# Patient Record
Sex: MALE | Race: WHITE | NOT HISPANIC OR LATINO | Employment: OTHER | URBAN - METROPOLITAN AREA
[De-identification: names, ages, dates, MRNs, and addresses within clinical notes are randomized per-mention and may not be internally consistent; named-entity substitution may affect disease eponyms.]

---

## 2017-01-24 ENCOUNTER — TELEPHONE (OUTPATIENT)
Dept: FAMILY MEDICINE | Facility: CLINIC | Age: 57
End: 2017-01-24

## 2017-01-24 DIAGNOSIS — E66.9 OBESITY: ICD-10-CM

## 2017-01-24 DIAGNOSIS — E11.40 TYPE 2 DIABETES MELLITUS WITH DIABETIC NEUROPATHY, WITHOUT LONG-TERM CURRENT USE OF INSULIN (H): Primary | ICD-10-CM

## 2017-03-02 ENCOUNTER — TELEPHONE (OUTPATIENT)
Dept: NURSING | Facility: CLINIC | Age: 57
End: 2017-03-02

## 2017-03-09 ENCOUNTER — TELEPHONE (OUTPATIENT)
Dept: NURSING | Facility: CLINIC | Age: 57
End: 2017-03-09

## 2017-03-15 ENCOUNTER — TELEPHONE (OUTPATIENT)
Dept: NURSING | Facility: CLINIC | Age: 57
End: 2017-03-15

## 2017-03-27 ENCOUNTER — TELEPHONE (OUTPATIENT)
Dept: NURSING | Facility: CLINIC | Age: 57
End: 2017-03-27

## 2017-03-30 ENCOUNTER — ALLIED HEALTH/NURSE VISIT (OUTPATIENT)
Dept: NURSING | Facility: CLINIC | Age: 57
End: 2017-03-30

## 2017-03-30 VITALS — BODY MASS INDEX: 41.29 KG/M2 | WEIGHT: 248.13 LBS

## 2017-03-30 DIAGNOSIS — E66.9 OBESITY: ICD-10-CM

## 2017-03-30 DIAGNOSIS — L30.9 DERMATITIS: Primary | ICD-10-CM

## 2017-03-30 DIAGNOSIS — E11.40 TYPE 2 DIABETES MELLITUS WITH DIABETIC NEUROPATHY (H): Primary | ICD-10-CM

## 2017-03-30 PROCEDURE — 99207 ZZC HEALTH COACHING, NO CHARGE: CPT

## 2017-03-30 NOTE — TELEPHONE ENCOUNTER
hydrocortosone 0.2% cream     Last Written Prescription Date: ? Not on patients current medication list   Last Fill Quantity: ?,  # refills: ?   Last Office Visit with [facility], [facility] or [facility] prescribing provider: 11/15/2016    Patient requesting a 1 time prescription to be sent to [facility] and then a permanent prescription sent to [facility].  If you have any questions please contact patient at [phone].    Tonya Broussard/

## 2017-03-30 NOTE — MR AVS SNAPSHOT
After Visit Summary   3/30/2017    Steve Morgan    MRN: [id number]           Patient Information     Date Of Birth          1960        Visit Information        Provider Department      3/30/2017 1:00 PM HEALTH  - [facility]        Care Instructions      2017    [facility]  [address] [facility] [address]  [phone]  Health Coaching Progress Note    Patient Name: Steve Morgan Date: 2017      GOALS: Patient will work on the following goals until our next meetin) Check out [employer] program-see if eligible and look into walking option in [facility]-New Goal  2) Start journaling meals and snacks and use the carb counting guide as a reference (shoot for 45-60 grams per meal and 15-30 grams per snack)-New Goal   3) Be more active each day/get up and move as able-New Goal    Recipes--> www.diabetes.org (use the recipes tab at the top)  Food look up-->www.[employer]      Plan: (Homework, other):  Patient was encouraged to continue to seek condition-related information and education, as well as schedule a follow up appointment with the Health  in 4 weeks. Patient has set self-identified goals and will monitor progress until the next appointment.  Scheduled our second meeting for  at 1pm.  Parrish Bowles       Resources:              Follow-ups after your visit        Who to contact     If you have questions or need follow up information about today's clinic visit or your schedule please contact [facility] directly at [phone].  Normal or non-critical lab and imaging results will be communicated to you by MyChart, letter or phone within 4 business days after the clinic has received the results. If you do not hear from us within 7 days, please contact the clinic through MyChart or phone. If you have a  critical or abnormal lab result, we will notify you by phone as soon as possible.  Submit refill requests through BookTour or call your pharmacy and they will forward the refill request to us. Please allow 3 business days for your refill to be completed.          Additional Information About Your Visit        GeckoGohart Information     BookTour gives you secure access to your electronic health record. If you see a primary care provider, you can also send messages to your care team and make appointments. If you have questions, please call your primary care clinic.  If you do not have a primary care provider, please call [phone] and they will assist you.        Care EveryWhere ID     This is your Care EveryWhere ID. This could be used by other organizations to access your [place] medical records  [id number]         Blood Pressure from Last 3 Encounters:   11/15/16 128/80   05/26/16 128/80   05/19/16 137/80    Weight from Last 3 Encounters:   11/15/16 251 lb (113.9 kg)   05/26/16 236 lb (107 kg)   05/19/16 237 lb (107.5 kg)              Today, you had the following     No orders found for display       Primary Care Provider Office Phone # Fax #    Lisa Sue Pierre PA-C [phone]       [address] [address]        Thank you!     Thank you for choosing [facility]  for your care. Our goal is always to provide you with excellent care. Hearing back from our patients is one way we can continue to improve our services. Please take a few minutes to complete the written survey that you may receive in the mail after your visit with us. Thank you!             Your Updated Medication List - Protect others around you: Learn how to safely use, store and throw away your medicines at www.disposemymeds.org.          This list is accurate as of: 3/30/17  2:13 PM.  Always use your most recent med list.                   Brand Name Dispense Instructions  for use    aspirin 81 MG tablet     100    ONE DAILY       atenolol 25 MG tablet    TENORMIN    90 tablet    Take 1 tablet (25 mg) by mouth every morning       blood glucose monitoring lancets     1 Box    Use to test blood sugars 2 times daily or as directed.       blood glucose monitoring meter device kit     1 kit    Use to test blood sugars 2 times daily or as directed.       blood glucose monitoring test strip    ONE TOUCH VERIO IQ    100 strip    Use to test blood sugars 2 times daily or as directed.       buPROPion 300 MG 24 hr tablet    WELLBUTRIN XL    90 tablet    Take 1 tablet (300 mg) by mouth every morning       CALCIUM 600 + D PO      Take 1 tablet by mouth daily       citalopram 20 MG tablet    celeXA    90 tablet    Take 1 tablet (20 mg) by mouth daily       ferrous sulfate 325 (65 FE) MG tablet    IRON     Take 1 tablet by mouth daily (with breakfast).       glipiZIDE 10 MG 24 hr tablet    GLUCOTROL XL    180 tablet    Take 2 tablets (20 mg) by mouth every morning       levothyroxine 200 MCG tablet    SYNTHROID/LEVOTHROID    90 tablet    Take 1 tablet (200 mcg) by mouth daily       losartan 50 MG tablet    COZAAR    90 tablet    Take 1 tablet (50 mg) by mouth daily       metFORMIN 1000 MG tablet    GLUCOPHAGE    180 tablet    Take 1 tablet (1,000 mg) by mouth 2 times daily (with meals)       Multi-vitamin Tabs tablet   Generic drug:  multivitamin, therapeutic with minerals     30    1 TABLET DAILY       nystatin 015305 UNIT/GM Powd    MYCOSTATIN    60 g    Apply topically 3 times a day as needed to rash       omeprazole-sodium bicarbonate  MG per capsule    ZEGERID     Take 1 capsule by mouth every morning (before breakfast)       * order for DME     1 Units    Equipment being ordered: single cane with rubber foot       * order for DME     1 Units    Equipment being ordered: four pronged cane       * order for DME     3 Device    Equipment being ordered: Please dispense up to 3 pairs of knee  high compression stockings at 20-30 mmHg. Home Medical Equipment:  1.  [facility] [address] [address] [facility] [phone] 2. [facility] - [address]  [address]  [place], [phone]       simvastatin 20 MG tablet    ZOCOR    90 tablet    Take 1 tablet (20 mg) by mouth At Bedtime       VITAMIN B 12 PO      Take 250 mcg by mouth daily       VITAMIN D (CHOLECALCIFEROL) PO      Take 1,000 Units by mouth daily.       * Notice:  This list has 3 medication(s) that are the same as other medications prescribed for you. Read the directions carefully, and ask your doctor or other care provider to review them with you.

## 2017-03-30 NOTE — NURSING NOTE
March 30, 2017    [facility]  [address] [address] [address] [phone]  Health Coaching Progress Note    Patient Name: Steve Morgan Date: March 30, 2017      Session Length: 60      DATA    PRM Master Survey Scores Reviewed: Yes    Core Healthy Days Survey:    Would you say that in general your health is: : Fair    SHANNON Score (Last Two) 3/8/2011 3/30/2017   SHANNON Raw Score 44 29   Activation Score 70.8 52.9   SHANNON Level 4 2       PHQ-2 Score 3/30/2017 5/18/2016   PHQ-2 Total Score Interpretation - Positive if 3 or more points; Administer PHQ-9 if positive 5 -   MyC PHQ-2 Score - 3       PHQ-9 SCORE 11/15/2016 3/30/2017   Total Score - -   Total Score MyChart - -   Total Score 16 12       Treatment Objective(s) Addressed in This Session:  Target Behavior(s): diet/weight loss and disease management/lifestyle changes of working on finding some options to be more active each day-pt. interested in [employer] program which could help get a free membership to gym ([employer])-will look into eligibility, walking paths-losts of affordable options in [facility] for walking -will look at resource see what options might work for him, being more aware of what he eats/not skipping lunch-will start journaling food intake and tracking carb intake, and reading up on resources.    Current Stressors / Issues:  Neuropathy in hands and feet, mobility issues due to neuropathy, managing diabetes, depression-mood, doesn't always feel like taking care of himself, gets bored-can't work anymore due to hand issues, realizes he needs to be more social-helps with mood, diet-hard to make healthy decisions when his wife is at work, worried about falling, sleep apnea-wears CPAP.    What Patient Does Well:   1) Patient is very honest and positive-he understands the steps he needs to take to be healthier-just has to find the best way that works for him and his  life  Previous Successes:   1) Patient had success losing weight years ago using the weight watchers program  Areas in Need of Improvement:   1) Diabetes management-testing sugars  2) Diet-being more aware/less carbs?  3) Exercise/activity level  4) Mood-feeling better about self  Barriers to Change:   1) Severe neuropathy (can't feel feet or hands)  2) Limited mobility  3) Depression-doesn't always feel like taking care of himself  4) Motivation/Sticking with routine  Reasons for Change:   1) Improve mood/depression  2) manage diabetes  3) Move better/lose some weight  4) Be around for wife and kids  Plan/Goal for the Next 4 Weeks:   GOAL #1: Check out [employer] program-see if eligible (call # provided) and look into indoor walking options in [facility] (printouts provided for both)  GOAL #1 Progress Toward Goal: 0% New Goal  GOAL #2: Start journaling meals and snacks and use the carb counting guide as a reference (shoot for 60-75 grams per meal and 15-30 grams per snack)  GOAL #2 Progress Toward Goal: 0% New Goal  GOAL #3: Be more active each day/get up and move as able (work around the house/cleaning, etc.)  GOAL #3 Progress Toward Goal: 0% New Goal    Intervention:  Motivational Interviewing    MI Intervention: Co-Developed Goal: activity level, diet, Expressed Empathy/Understanding, Supported Autonomy, Collaboration, Evocation, Permission to raise concern or advise, Open-ended questions, Reflections: simple and complex and Change talk (evoked)     Change Talk Expressed by the Patient: Desire to change Ability to change Reasons to change Need to change Committment to change Activation Taking steps    Provider Response to Change Talk: E - Evoked more info from patient about behavior change, A - Affirmed patient's thoughts, decisions, or attempts at behavior change, R - Reflected patient's change talk and S - Summarized patient's change talk statements    Assessment / Progress on Treatment Objective(s) /  Homework:    New Objective established this session - PREPARATION (Decided to change - considering how); Intervened by negotiating a change plan and determining options / strategies for behavior change, identifying triggers, exploring social supports, and working towards setting a date to begin behavior change         Plan: (Homework, other):  Patient was encouraged to continue to seek condition-related information and education, as well as schedule a follow up appointment with the Health  in 4 weeks. Patient has set self-identified goals and will monitor progress until the next appointment.  Scheduled our second meeting for Tuesday April 25th at 1pm.  Parrish Bowles

## 2017-03-30 NOTE — PATIENT INSTRUCTIONS
2017    [address] [address]  [phone]  Health Coaching Progress Note    Patient Name: Steve Morgan Date: 2017      GOALS: Patient will work on the following goals until our next meetin) Check out "[employer]" program-see if eligible and look into walking option in [facility]-New Goal  2) Start journaling meals and snacks and use the carb counting guide as a reference (shoot for 60-75 grams per meal and 15-30 grams per snack)-New Goal   3) Be more active each day/get up and move as able (work around the house/cleaning, etc.)-New Goal    Recipes--> www.diabetes.org (use the recipes tab at the top)  Food look up-->www.[employer].com      Plan: (Homework, other):  Patient was encouraged to continue to seek condition-related information and education, as well as schedule a follow up appointment with the Health  in 4 weeks. Patient has set self-identified goals and will monitor progress until the next appointment.  Scheduled our second meeting for  at 1pm.  Parrish Bowles       Resources:

## 2017-03-31 RX ORDER — HYDROCORTISONE VALERATE CREAM 2 MG/G
CREAM TOPICAL 2 TIMES DAILY PRN
Qty: 45 G | Refills: 1 | Status: SHIPPED | OUTPATIENT
Start: 2017-03-31 | End: 2017-12-08

## 2017-03-31 ASSESSMENT — PATIENT HEALTH QUESTIONNAIRE - PHQ9: SUM OF ALL RESPONSES TO PHQ QUESTIONS 1-9: 12

## 2017-03-31 NOTE — TELEPHONE ENCOUNTER
Routing refill request to provider for review/approval because:  Drug not active on patient's medication list  Last prescribed by Mariangel Fernando for Rash 1/21/13 and discontinued 10/22/14  Gaurav Lovell RN

## 2017-04-25 ENCOUNTER — TELEPHONE (OUTPATIENT)
Dept: NURSING | Facility: CLINIC | Age: 57
End: 2017-04-25

## 2017-04-25 DIAGNOSIS — E03.9 HYPOTHYROIDISM, UNSPECIFIED TYPE: ICD-10-CM

## 2017-04-25 NOTE — TELEPHONE ENCOUNTER
levothyroxine (SYNTHROID/LEVOTHROID) 200 MCG tablet    Last Written Prescription Date: 11/15/2016  Last Quantity: 90,01/27/2017 # refills: 1  Last Office Visit with [facility], [facility] or [facility] prescribing provider: 11/15/2016-Lisa Pierre   Next 5 appointments (look out 90 days)     Apr 25, 2017  1:00 PM CDT   Nurse Only with HEALTH  - [address] ([facility])    [address] [place] [address] [phone]                   TSH   Date Value Ref Range Status   12/22/2016 0.48 0.40 - 4.00 mU/L Final

## 2017-04-27 RX ORDER — LEVOTHYROXINE SODIUM 200 UG/1
200 TABLET ORAL DAILY
Qty: 90 TABLET | Refills: 1 | Status: SHIPPED | OUTPATIENT
Start: 2017-04-27 | End: 2017-12-12

## 2017-04-27 NOTE — TELEPHONE ENCOUNTER
Prescription approved per [facility] Refill Protocol  Laura Pizarro RN BS

## 2017-05-02 DIAGNOSIS — I10 ESSENTIAL HYPERTENSION WITH GOAL BLOOD PRESSURE LESS THAN 140/90: ICD-10-CM

## 2017-05-02 NOTE — TELEPHONE ENCOUNTER
losartan (COZAAR) 50 MG tablet      Last Written Prescription Date: 5/26/16  Last Fill Quantity: 90, # refills: 3  Last Office Visit with G, P or [facility] prescribing provider: 5/26/2016         Potassium   Date Value Ref Range Status   05/19/2016 4.8 3.4 - 5.3 mmol/L Final     Creatinine   Date Value Ref Range Status   05/19/2016 1.17 0.66 - 1.25 mg/dL Final     BP Readings from Last 3 Encounters:   11/15/16 128/80   05/26/16 128/80   05/19/16 137/80         EUGENIO Luevano  May 2, 2017  8:04 AM

## 2017-05-03 RX ORDER — LOSARTAN POTASSIUM 50 MG/1
50 TABLET ORAL DAILY
Qty: 90 TABLET | Refills: 0 | Status: SHIPPED | OUTPATIENT
Start: 2017-05-03 | End: 2017-08-01

## 2017-05-03 NOTE — TELEPHONE ENCOUNTER
Last OV: 11.15.16    Prescription approved per [facility] Refill Protocol  Laura Pizarro RN BS

## 2017-05-04 ENCOUNTER — TELEPHONE (OUTPATIENT)
Dept: NURSING | Facility: CLINIC | Age: 57
End: 2017-05-04

## 2017-05-11 ENCOUNTER — TELEPHONE (OUTPATIENT)
Dept: NURSING | Facility: CLINIC | Age: 57
End: 2017-05-11

## 2017-05-17 ENCOUNTER — OFFICE VISIT (OUTPATIENT)
Dept: FAMILY MEDICINE | Facility: CLINIC | Age: 57
End: 2017-05-17
Payer: COMMERCIAL

## 2017-05-17 DIAGNOSIS — I10 ESSENTIAL HYPERTENSION WITH GOAL BLOOD PRESSURE LESS THAN 140/90: ICD-10-CM

## 2017-05-17 DIAGNOSIS — R45.4 EXCESSIVE ANGER: ICD-10-CM

## 2017-05-17 DIAGNOSIS — Z13.89 SCREENING FOR DIABETIC PERIPHERAL NEUROPATHY: ICD-10-CM

## 2017-05-17 DIAGNOSIS — E78.5 HYPERLIPIDEMIA LDL GOAL <100: ICD-10-CM

## 2017-05-17 DIAGNOSIS — K29.00 OTHER ACUTE GASTRITIS WITHOUT HEMORRHAGE: ICD-10-CM

## 2017-05-17 DIAGNOSIS — R23.4 FISSURE IN SKIN: ICD-10-CM

## 2017-05-17 DIAGNOSIS — E11.40 TYPE 2 DIABETES MELLITUS WITH DIABETIC NEUROPATHY, WITHOUT LONG-TERM CURRENT USE OF INSULIN (H): Primary | ICD-10-CM

## 2017-05-17 LAB
ERYTHROCYTE [DISTWIDTH] IN BLOOD BY AUTOMATED COUNT: 13.3 % (ref 10–15)
HBA1C MFR BLD: 10 % (ref 4.3–6)
HCT VFR BLD AUTO: 35.1 % (ref 40–53)
HGB BLD-MCNC: 12.3 G/DL (ref 13.3–17.7)
MCH RBC QN AUTO: 29.9 PG (ref 26.5–33)
MCHC RBC AUTO-ENTMCNC: 35 G/DL (ref 31.5–36.5)
MCV RBC AUTO: 85 FL (ref 78–100)
PLATELET # BLD AUTO: 349 10E9/L (ref 150–450)
RBC # BLD AUTO: 4.12 10E12/L (ref 4.4–5.9)
WBC # BLD AUTO: 6 10E9/L (ref 4–11)

## 2017-05-17 PROCEDURE — 80053 COMPREHEN METABOLIC PANEL: CPT | Performed by: PHYSICIAN ASSISTANT

## 2017-05-17 PROCEDURE — 99207 C FOOT EXAM  NO CHARGE: CPT | Performed by: PHYSICIAN ASSISTANT

## 2017-05-17 PROCEDURE — 99214 OFFICE O/P EST MOD 30 MIN: CPT | Performed by: PHYSICIAN ASSISTANT

## 2017-05-17 PROCEDURE — 85027 COMPLETE CBC AUTOMATED: CPT | Performed by: PHYSICIAN ASSISTANT

## 2017-05-17 PROCEDURE — 36415 COLL VENOUS BLD VENIPUNCTURE: CPT | Performed by: PHYSICIAN ASSISTANT

## 2017-05-17 PROCEDURE — 80061 LIPID PANEL: CPT | Performed by: PHYSICIAN ASSISTANT

## 2017-05-17 PROCEDURE — 83036 HEMOGLOBIN GLYCOSYLATED A1C: CPT | Performed by: PHYSICIAN ASSISTANT

## 2017-05-17 RX ORDER — ATENOLOL 25 MG/1
25 TABLET ORAL EVERY MORNING
Qty: 90 TABLET | Refills: 1 | Status: SHIPPED | OUTPATIENT
Start: 2017-05-17 | End: 2017-12-12

## 2017-05-17 RX ORDER — SIMVASTATIN 20 MG
20 TABLET ORAL AT BEDTIME
Qty: 90 TABLET | Refills: 3 | Status: SHIPPED | OUTPATIENT
Start: 2017-05-17 | End: 2018-11-19

## 2017-05-17 RX ORDER — BUPROPION HYDROCHLORIDE 300 MG/1
300 TABLET ORAL EVERY MORNING
Qty: 90 TABLET | Refills: 1 | Status: SHIPPED | OUTPATIENT
Start: 2017-05-17 | End: 2017-12-12

## 2017-05-17 RX ORDER — CITALOPRAM HYDROBROMIDE 20 MG/1
20 TABLET ORAL DAILY
Qty: 90 TABLET | Refills: 1 | Status: SHIPPED | OUTPATIENT
Start: 2017-05-17 | End: 2017-12-12

## 2017-05-17 RX ORDER — CEPHALEXIN 500 MG/1
500 CAPSULE ORAL 3 TIMES DAILY
Qty: 30 CAPSULE | Refills: 0 | Status: SHIPPED | OUTPATIENT
Start: 2017-05-17 | End: 2017-06-24

## 2017-05-17 NOTE — NURSING NOTE
"Chief Complaint   Patient presents with     Diabetes     Nausea       Initial /90 (BP Location: Right arm, Patient Position: Chair, Cuff Size: Adult Large)  Pulse 82  Temp 98.7  F (37.1  C) (Oral)  Resp 17  Ht 5' 5\" (1.651 m)  Wt 236 lb 9.6 oz (107.3 kg)  SpO2 96%  BMI 39.37 kg/m2 Estimated body mass index is 39.37 kg/(m^2) as calculated from the following:    Height as of this encounter: 5' 5\" (1.651 m).    Weight as of this encounter: 236 lb 9.6 oz (107.3 kg).  Medication Reconciliation: complete Yenni Castro MA  Health Maintenance has been reviewed.       "

## 2017-05-17 NOTE — MR AVS SNAPSHOT
After Visit Summary   5/17/2017    Steve Morgan    MRN: [id number]           Patient Information     Date Of Birth          1960        Visit Information        Provider Department      5/17/2017 9:15 AM Lisa Pirere PA-C [facility]        Today's Diagnoses     Screening for diabetic peripheral neuropathy    -  1    Type 2 diabetes mellitus with diabetic neuropathy, without long-term current use of insulin (H)        Hyponatremia        Hyperlipidemia LDL goal <100        Fissure in skin        Excessive anger        Essential hypertension with goal blood pressure less than 140/90           Follow-ups after your visit        Additional Services     ENDOCRINOLOGY ADULT REFERRAL       Your provider has referred you to: [facility]: [facility] [phone]   [email or web address]      Please be aware that coverage of these services is subject to the terms and limitations of your health insurance plan.  Call member services at your health plan with any benefit or coverage questions.      Please bring the following to your appointment:    >>   Any x-rays, CTs or MRIs which have been performed.  Contact the facility where they were done to arrange for  prior to your scheduled appointment.    >>   List of current medications   >>   This referral request   >>   Any documents/labs given to you for this referral                  Who to contact     If you have questions or need follow up information about today's clinic visit or your schedule please contact [facility] directly at [phone].  Normal or non-critical lab and imaging results will be communicated to you by MyChart, letter or phone within 4 business days after the clinic has received the results. If you do not hear from us within 7 days, please contact the clinic through MyChart or phone. If you have a critical or abnormal lab result, we will  "notify you by phone as soon as possible.  Submit refill requests through Dianxin or call your pharmacy and they will forward the refill request to us. Please allow 3 business days for your refill to be completed.          Additional Information About Your Visit        [employer] Leaning[employer]TouchOfModern Information     Dianxin gives you secure access to your electronic health record. If you see a primary care provider, you can also send messages to your care team and make appointments. If you have questions, please call your primary care clinic.  If you do not have a primary care provider, please call [phone] and they will assist you.        Care EveryWhere ID     This is your Care EveryWhere ID. This could be used by other organizations to access your [place] medical records  [id number]        Your Vitals Were     Pulse Temperature Respirations Height Pulse Oximetry BMI (Body Mass Index)    82 98.7  F (37.1  C) (Oral) 17 5' 5\" (1.651 m) 96% 39.37 kg/m2       Blood Pressure from Last 3 Encounters:   05/17/17 139/90   11/15/16 128/80   05/26/16 128/80    Weight from Last 3 Encounters:   05/17/17 236 lb 9.6 oz (107.3 kg)   03/30/17 248 lb 2 oz (112.5 kg)   11/15/16 251 lb (113.9 kg)              We Performed the Following     CBC with platelets     Comprehensive metabolic panel (BMP + Alb, Alk Phos, ALT, AST, Total. Bili, TP)     ENDOCRINOLOGY ADULT REFERRAL     FOOT EXAM  NO CHARGE [72077.114]     HEMOGLOBIN A1C     Lipid panel reflex to direct LDL          Today's Medication Changes          These changes are accurate as of: 5/17/17  9:48 AM.  If you have any questions, ask your nurse or doctor.               Start taking these medicines.        Dose/Directions    cephALEXin 500 MG capsule   Commonly known as:  KEFLEX   Used for:  Fissure in skin   Started by:  Lisa Pierre PA-C        Dose:  500 mg   Take 1 capsule (500 mg) by mouth 3 times daily   Quantity:  30 capsule   Refills:  0         These medicines have changed or " have updated prescriptions.        Dose/Directions    * order for DME   This may have changed:  Another medication with the same name was removed. Continue taking this medication, and follow the directions you see here.   Used for:  Type 2 diabetes, HbA1C goal < 8% (H), Neuropathy in diabetes (H), Unsteady gait   Changed by:  Lisa Pierre PA-C        Equipment being ordered: single cane with rubber foot   Quantity:  1 Units   Refills:  0       * order for DME   This may have changed:  Another medication with the same name was removed. Continue taking this medication, and follow the directions you see here.   Used for:  Unsteady gait, Neuropathy in diabetes (H), Type 2 diabetes, HbA1C goal < 8% (H)   Changed by:  Lisa Pierre PA-C        Equipment being ordered: four pronged cane   Quantity:  1 Units   Refills:  0       * Notice:  This list has 2 medication(s) that are the same as other medications prescribed for you. Read the directions carefully, and ask your doctor or other care provider to review them with you.      Stop taking these medicines if you haven't already. Please contact your care team if you have questions.     nystatin 155244 UNIT/GM Powd   Commonly known as:  MYCOSTATIN   Stopped by:  Lisa Pierre PA-C                Where to get your medicines      These medications were sent to [facility] [address] [address]     Phone:  [phone]     atenolol 25 MG tablet    buPROPion 300 MG 24 hr tablet    cephALEXin 500 MG capsule    citalopram 20 MG tablet    simvastatin 20 MG tablet                Primary Care Provider Office Phone # Fax #    Lisa Pierre PA-C [phone]       [address] [address]        Thank you!     Thank you for choosing [facility]  for your care. Our goal is always to provide you with  excellent care. Hearing back from our patients is one way we can continue to improve our services. Please take a few minutes to complete the written survey that you may receive in the mail after your visit with us. Thank you!             Your Updated Medication List - Protect others around you: Learn how to safely use, store and throw away your medicines at www.disposemymeds.org.          This list is accurate as of: 5/17/17  9:48 AM.  Always use your most recent med list.                   Brand Name Dispense Instructions for use    aspirin 81 MG tablet     100    ONE DAILY       atenolol 25 MG tablet    TENORMIN    90 tablet    Take 1 tablet (25 mg) by mouth every morning       blood glucose monitoring lancets     1 Box    Use to test blood sugars 2 times daily or as directed.       blood glucose monitoring meter device kit     1 kit    Use to test blood sugars 2 times daily or as directed.       blood glucose monitoring test strip    ONE TOUCH VERIO IQ    100 strip    Use to test blood sugars 2 times daily or as directed.       buPROPion 300 MG 24 hr tablet    WELLBUTRIN XL    90 tablet    Take 1 tablet (300 mg) by mouth every morning       CALCIUM 600 + D PO      Take 1 tablet by mouth daily       cephALEXin 500 MG capsule    KEFLEX    30 capsule    Take 1 capsule (500 mg) by mouth 3 times daily       citalopram 20 MG tablet    celeXA    90 tablet    Take 1 tablet (20 mg) by mouth daily       ferrous sulfate 325 (65 FE) MG tablet    IRON     Take 1 tablet by mouth daily (with breakfast).       glipiZIDE 10 MG 24 hr tablet    GLUCOTROL XL    180 tablet    Take 2 tablets (20 mg) by mouth every morning       hydrocortisone 0.2 % cream    WESTCORT    45 g    Apply topically 2 times daily as needed Apply sparingly to affected area, BID.       levothyroxine 200 MCG tablet    SYNTHROID/LEVOTHROID    90 tablet    Take 1 tablet (200 mcg) by mouth daily       losartan 50 MG tablet    COZAAR    90 tablet    Take 1 tablet  (50 mg) by mouth daily       metFORMIN 1000 MG tablet    GLUCOPHAGE    180 tablet    Take 1 tablet (1,000 mg) by mouth 2 times daily (with meals)       Multi-vitamin Tabs tablet   Generic drug:  multivitamin, therapeutic with minerals     30    1 TABLET DAILY       omeprazole-sodium bicarbonate  MG per capsule    ZEGERID     Take 1 capsule by mouth every morning (before breakfast)       * order for DME     1 Units    Equipment being ordered: single cane with rubber foot       * order for DME     1 Units    Equipment being ordered: four pronged cane       simvastatin 20 MG tablet    ZOCOR    90 tablet    Take 1 tablet (20 mg) by mouth At Bedtime       VITAMIN B 12 PO      Take 250 mcg by mouth daily       VITAMIN D (CHOLECALCIFEROL) PO      Take 1,000 Units by mouth daily.       * Notice:  This list has 2 medication(s) that are the same as other medications prescribed for you. Read the directions carefully, and ask your doctor or other care provider to review them with you.

## 2017-05-17 NOTE — PROGRESS NOTES
SUBJECTIVE:                                                    Steve Morgan is a 57 year old male who presents to clinic today for the following health issues:        Diabetes Follow-up      Patient is checking blood sugars: not at all    Diabetic concerns: None     Symptoms of hypoglycemia (low blood sugar): none     Paresthesias (numbness or burning in feet) or sores: Yes left foot     Date of last diabetic eye exam: about 1 year ago     Hyperlipidemia Follow-Up      Rate your low fat/cholesterol diet?: not monitoring fat    Taking statin?  Yes, no muscle aches from statin    Other lipid medications/supplements?:  none     Hypertension Follow-up      Outpatient blood pressures are not being checked.    Low Salt Diet: not monitoring salt         Amount of exercise or physical activity: None    Problems taking medications regularly: No    Medication side effects: Maybe nausea from something     Diet: regular (no restrictions)    Patient states that he has been having nausea and vomiting every time he eats. Patient states he has been having gas as well x 3 days  Patient states he is not taking any of his medications at this time due to not knowing if the medications are making him sick    Medication Followup of wellbutrin and celexa    Taking Medication as prescribed: yes    Side Effects:  None    Medication Helping Symptoms:  yes     Nausea and vomiting     Onset: 3 days      Intensity: mild, moderate    Progression of Symptoms:  waxing and waning    Accompanying Signs & Symptoms:  Fever/Chills?: no   Gas/Bloating: YES  Nausea: YES  Vomitting: YES  Diarrhea?: no   Constipation:no   Dysuria or Hematuria: no    History:   Trauma: no   Previous similar pain: no    Previous tests done: none    Precipitating factors:   Does the pain change with:     Food: YES     BM: no     Urination: no     Alleviating factors:  none    Therapies Tried and outcome: stopped DM meds    LMP:           Problem list and histories reviewed  & adjusted, as indicated.  Additional history: as documented    Patient Active Problem List   Diagnosis     Anemia     Obesity     Sleep apnea     Neuropathy in diabetes (H)     Hypothyroidism     HYPERLIPIDEMIA LDL GOAL <100     Hypertension goal BP (blood pressure) < 140/90     Peripheral arterial disease (H)     Tremor     Peripheral neuropathy (H)     Excessive anger     Generalized muscle weakness     Health Care Home     Unsteady gait     DAMIÁN (obstructive sleep apnea)     Vitamin B12 deficiency without anemia     Hyponatremia     Chronic hyponatremia     Type 2 diabetes mellitus with diabetic neuropathy (H)     Past Surgical History:   Procedure Laterality Date     COLONOSCOPY       GENITOURINARY SURGERY      surg for undescended testicle     REPAIR HAMMER TOE BILATERAL  5/16/2013    Procedure: REPAIR HAMMER TOE BILATERAL;  Flexor Tenotomy Toes 2,3,4,5 Bilateral Feet;  Surgeon: Saad Bangura DPM;  Location:  OR       Social History   Substance Use Topics     Smoking status: Never Smoker     Smokeless tobacco: Never Used     Alcohol use Yes      Comment: occ     Family History   Problem Relation Age of Onset     Breast Cancer Mother      Hypertension Mother      Thyroid Disease Mother      Depression Mother      Cancer - colorectal Father      Thyroid Disease Father      Depression Father      Circulatory Paternal Grandmother      CANCER Paternal Grandfather      DIABETES Brother      DIABETES Brother      HEART DISEASE Maternal Grandmother      CHF           Reviewed and updated as needed this visit by clinical staff  Tobacco  Allergies       Reviewed and updated as needed this visit by Provider         ROS:  Constitutional, HEENT, cardiovascular, pulmonary, GI, , musculoskeletal, neuro, skin, endocrine and psych systems are negative, except as otherwise noted.    OBJECTIVE:                                                    /89 (BP Location: Right arm, Patient Position: Chair, Cuff Size:  "Adult Large)  Pulse 82  Temp 98.7  F (37.1  C) (Oral)  Resp 17  Ht 5' 5\" (1.651 m)  Wt 236 lb 9.6 oz (107.3 kg)  SpO2 96%  BMI 39.37 kg/m2  Body mass index is 39.37 kg/(m^2).  GENERAL APPEARANCE: healthy, alert and no distress  HENT: ear canals and TM's normal and nose and mouth without ulcers or lesions  RESP: lungs clear to auscultation - no rales, rhonchi or wheezes  CV: regular rates and rhythm, normal S1 S2, no S3 or S4 and no murmur, click or rub  ABDOMEN: soft, nontender, without hepatosplenomegaly or masses and bowel sounds normal  MS: extremities normal- no gross deformities noted  SKIN: fissure noted on plantar foot. Skin fissures on hands   DIABETIC FOOT EXAM: normal DP and PT pulses and reduced sensation at ankle    PSYCH: mentation appears normal and affect flat         ASSESSMENT/PLAN:                                                            1. Type 2 diabetes mellitus with diabetic neuropathy, without long-term current use of insulin (H)  Caio is not monitoring his sugars or taking medications.  I suspect his GI symptoms today are either viral or related to elevated sugars.  We need to get him back on track.  Agrees to see Endocrinology  Given information for [employer] for nail trim and foot care.   - HEMOGLOBIN A1C  - Comprehensive metabolic panel (BMP + Alb, Alk Phos, ALT, AST, Total. Bili, TP)  - CBC with platelets  - ENDOCRINOLOGY ADULT REFERRAL    2. Essential hypertension with goal blood pressure less than 140/90  Stable.   - atenolol (TENORMIN) 25 MG tablet; Take 1 tablet (25 mg) by mouth every morning  Dispense: 90 tablet; Refill: 1    3. Screening for diabetic peripheral neuropathy  Significant periopheral neuropathy  - FOOT EXAM  NO CHARGE [61115.114]    4. Hyperlipidemia LDL goal <100    - Lipid panel reflex to direct LDL  - simvastatin (ZOCOR) 20 MG tablet; Take 1 tablet (20 mg) by mouth At Bedtime  Dispense: 90 tablet; Refill: 3    5. Other acute gastritis without " hemorrhage  Monitor symptoms. See #1  - Comprehensive metabolic panel (BMP + Alb, Alk Phos, ALT, AST, Total. Bili, TP)  - CBC with platelets    6. Fissure in skin  emollients  - cephALEXin (KEFLEX) 500 MG capsule; Take 1 capsule (500 mg) by mouth 3 times daily  Dispense: 30 capsule; Refill: 0    7. Excessive anger  Stable. F/u in 6 months.   - buPROPion (WELLBUTRIN XL) 300 MG 24 hr tablet; Take 1 tablet (300 mg) by mouth every morning  Dispense: 90 tablet; Refill: 1  - citalopram (CELEXA) 20 MG tablet; Take 1 tablet (20 mg) by mouth daily  Dispense: 90 tablet; Refill: 1        Lisa Pierre PA-C, ANASTASIA  [facility]

## 2017-05-18 VITALS
SYSTOLIC BLOOD PRESSURE: 139 MMHG | DIASTOLIC BLOOD PRESSURE: 89 MMHG | WEIGHT: 236.6 LBS | TEMPERATURE: 98.7 F | OXYGEN SATURATION: 96 % | RESPIRATION RATE: 17 BRPM | HEIGHT: 65 IN | HEART RATE: 82 BPM | BODY MASS INDEX: 39.42 KG/M2

## 2017-05-18 LAB
ALBUMIN SERPL-MCNC: 3.8 G/DL (ref 3.4–5)
ALP SERPL-CCNC: 98 U/L (ref 40–150)
ALT SERPL W P-5'-P-CCNC: 35 U/L (ref 0–70)
ANION GAP SERPL CALCULATED.3IONS-SCNC: 9 MMOL/L (ref 3–14)
AST SERPL W P-5'-P-CCNC: 22 U/L (ref 0–45)
BILIRUB SERPL-MCNC: 0.5 MG/DL (ref 0.2–1.3)
BUN SERPL-MCNC: 10 MG/DL (ref 7–30)
CALCIUM SERPL-MCNC: 9.2 MG/DL (ref 8.5–10.1)
CHLORIDE SERPL-SCNC: 98 MMOL/L (ref 94–109)
CHOLEST SERPL-MCNC: 192 MG/DL
CO2 SERPL-SCNC: 24 MMOL/L (ref 20–32)
CREAT SERPL-MCNC: 1.02 MG/DL (ref 0.66–1.25)
GFR SERPL CREATININE-BSD FRML MDRD: 75 ML/MIN/1.7M2
GLUCOSE SERPL-MCNC: 368 MG/DL (ref 70–99)
HDLC SERPL-MCNC: 30 MG/DL
LDLC SERPL CALC-MCNC: 126 MG/DL
NONHDLC SERPL-MCNC: 162 MG/DL
POTASSIUM SERPL-SCNC: 4.6 MMOL/L (ref 3.4–5.3)
PROT SERPL-MCNC: 7.8 G/DL (ref 6.8–8.8)
SODIUM SERPL-SCNC: 131 MMOL/L (ref 133–144)
TRIGL SERPL-MCNC: 179 MG/DL

## 2017-05-23 ENCOUNTER — OFFICE VISIT (OUTPATIENT)
Dept: ENDOCRINOLOGY | Facility: CLINIC | Age: 57
End: 2017-05-23
Payer: COMMERCIAL

## 2017-05-23 VITALS
SYSTOLIC BLOOD PRESSURE: 148 MMHG | BODY MASS INDEX: 40.6 KG/M2 | DIASTOLIC BLOOD PRESSURE: 66 MMHG | HEART RATE: 84 BPM | TEMPERATURE: 98.1 F | WEIGHT: 244 LBS

## 2017-05-23 DIAGNOSIS — E11.40 TYPE 2 DIABETES MELLITUS WITH DIABETIC NEUROPATHY, UNSPECIFIED LONG TERM INSULIN USE STATUS: Primary | ICD-10-CM

## 2017-05-23 PROCEDURE — 99244 OFF/OP CNSLTJ NEW/EST MOD 40: CPT | Performed by: CLINICAL NURSE SPECIALIST

## 2017-05-23 NOTE — NURSING NOTE
"Chief Complaint   Patient presents with     Diabetes       Initial /66 (BP Location: Left arm, Patient Position: Chair, Cuff Size: Adult Regular)  Pulse 84  Temp 98.1  F (36.7  C) (Oral)  Wt 244 lb (110.7 kg)  BMI 40.6 kg/m2 Estimated body mass index is 40.6 kg/(m^2) as calculated from the following:    Height as of 5/17/17: 5' 5\" (1.651 m).    Weight as of this encounter: 244 lb (110.7 kg).  Medication Reconciliation: {Medication Reconciliation:400160}    "

## 2017-05-23 NOTE — MR AVS SNAPSHOT
After Visit Summary   5/23/2017    Steve Morgan    MRN: [id number]           Patient Information     Date Of Birth          1960        Visit Information        Provider Department      5/23/2017 1:30 PM Diana Butler APRN [facility]        Today's Diagnoses     Type 2 diabetes mellitus with diabetic neuropathy, unspecified long term insulin use status (H)    -  1      Care Instructions         the insulin and pen needles from the pharmacy and bring with you to your appointment with Lucía Bermudez, diabetes educator.    After you see Lucía, start the Lantus insulin, 10 units every evening, about the same time each evening.    Test your blood sugar at least twice daily, in the morning before you eat breakfast and in the evening before you take the Lantus injection.    Bring your meter with to all appointments with me and with diabetes ed.    Follow up with me in one month.    Diana Butler NP  Endocrinology          Follow-ups after your visit        Additional Services     DIABETES EDUCATOR REFERRAL       DIABETES SELF MANAGEMENT TRAINING (DSMT)      Your provider has referred you to Diabetes Education: [facility]: Diabetes Education - All [facility] [phone]   [email or web address]    Type of training and number of hours: Previous Diagnosis: Follow-up DSMT - 2 hours.    Medicare covers: 10 hours of initial DSMT in 12 month period from the time of first visit, plus 2 hours of follow-up DSMT annually, and additional hours as requested for insulin training.    Diabetes Type: Type 2 - On Oral Medication             Diabetes Co-Morbidities: none               A1C Goal:  <7.0       A1C is: Lab Results       Component                Value               Date                       A1C                      10.0                05/17/2017              If an urgent visit is needed or A1C is above 12, Care Team to call the  Diabetes Education Team at [phone] or send an In Basket message to the Diabetes Education Pool (P DIAB ED-PATIENT CARE).    Diabetes Education Topics: Medication Start: Insulin: Type/Dose/Timing: Lantus 10 units q hs    Special Educational Needs Requiring Individual DSMT: None       MEDICAL NUTRITION THERAPY (MNT) for Diabetes    Medical Nutrition Therapy with a Registered Dietitian can be provided in coordination with Diabetes Self-Management Training to assist in achieving optimal diabetes management.    MNT Type and Hours: Previous diagnosis: Annual follow-up MNT - 2 hours                       Medicare will cover: 3 hours initial MNT in 12 month period after first visit, plus 2 hours of follow-up MNT annually    Please be aware that coverage of these services is subject to the terms and limitations of your health insurance plan.  Call member services at your health plan to determine Diabetes Self-Management Training benefits and ask which blood glucose monitor brands are covered by your plan.      Please bring the following with you to your appointment:    (1)  List of current medications   (2)  List of Blood Glucose Monitor brands that are covered by your insurance plan  (3)  Blood Glucose Monitor and log book  (4)   Food records for the 3 days prior to your visit    The Certified Diabetes Educator may make diabetes medication adjustments per the CDE Protocol and Collaborative Practice Agreement.                  Who to contact     If you have questions or need follow up information about today's clinic visit or your schedule please contact [place] [facility] directly at [phone].  Normal or non-critical lab and imaging results will be communicated to you by MyChart, letter or phone within 4 business days after the clinic has received the results. If you do not hear from us within 7 days, please contact the clinic through MyChart or phone. If you have a critical or abnormal lab result, we  will notify you by phone as soon as possible.  Submit refill requests through [employer] or call your pharmacy and they will forward the refill request to us. Please allow 3 business days for your refill to be completed.          Additional Information About Your Visit        [employer] Information     [employer] gives you secure access to your electronic health record. If you see a primary care provider, you can also send messages to your care team and make appointments. If you have questions, please call your primary care clinic.  If you do not have a primary care provider, please call [phone] and they will assist you.        Care EveryWhere ID     This is your Care EveryWhere ID. This could be used by other organizations to access your [place] medical records  [id number]        Your Vitals Were     Pulse Temperature BMI (Body Mass Index)             84 98.1  F (36.7  C) (Oral) 40.6 kg/m2          Blood Pressure from Last 3 Encounters:   05/23/17 148/66   05/17/17 139/89   11/15/16 128/80    Weight from Last 3 Encounters:   05/23/17 110.7 kg (244 lb)   05/17/17 107.3 kg (236 lb 9.6 oz)   03/30/17 112.5 kg (248 lb 2 oz)              We Performed the Following     DIABETES EDUCATOR REFERRAL          Today's Medication Changes          These changes are accurate as of: 5/23/17  2:53 PM.  If you have any questions, ask your nurse or doctor.               Start taking these medicines.        Dose/Directions    insulin glargine 100 UNIT/ML injection   Commonly known as:  LANTUS SOLOSTAR   Used for:  Type 2 diabetes mellitus with diabetic neuropathy, unspecified long term insulin use status (H)   Started by:  Diana Butler APRN CNP        Inject 10 units sq q hs   Quantity:  15 mL   Refills:  3       insulin pen needle 31G X 5 MM   Commonly known as:  B-D U/F   Used for:  Type 2 diabetes mellitus with diabetic neuropathy, unspecified long term insulin use status (H)   Started by:  Diana Butler APRN CNP         Use 1 daily or as directed.   Quantity:  100 each   Refills:  prn            Where to get your medicines      Some of these will need a paper prescription and others can be bought over the counter.  Ask your nurse if you have questions.     Bring a paper prescription for each of these medications     insulin glargine 100 UNIT/ML injection    insulin pen needle 31G X 5 MM                Primary Care Provider Office Phone # Fax #    Lisa Sue Pierre PA-C [phone]       [address] [address]        Thank you!     Thank you for choosing [facility]  for your care. Our goal is always to provide you with excellent care. Hearing back from our patients is one way we can continue to improve our services. Please take a few minutes to complete the written survey that you may receive in the mail after your visit with us. Thank you!             Your Updated Medication List - Protect others around you: Learn how to safely use, store and throw away your medicines at www.disposemymeds.org.          This list is accurate as of: 5/23/17  2:53 PM.  Always use your most recent med list.                   Brand Name Dispense Instructions for use    aspirin 81 MG tablet     100    ONE DAILY       atenolol 25 MG tablet    TENORMIN    90 tablet    Take 1 tablet (25 mg) by mouth every morning       blood glucose monitoring lancets     1 Box    Use to test blood sugars 2 times daily or as directed.       blood glucose monitoring meter device kit     1 kit    Use to test blood sugars 2 times daily or as directed.       blood glucose monitoring test strip    ONE TOUCH VERIO IQ    100 strip    Use to test blood sugars 2 times daily or as directed.       buPROPion 300 MG 24 hr tablet    WELLBUTRIN XL    90 tablet    Take 1 tablet (300 mg) by mouth every morning       CALCIUM 600 + D PO      Take 1 tablet by mouth daily       cephALEXin 500 MG capsule    KEFLEX     30 capsule    Take 1 capsule (500 mg) by mouth 3 times daily       citalopram 20 MG tablet    celeXA    90 tablet    Take 1 tablet (20 mg) by mouth daily       ferrous sulfate 325 (65 FE) MG tablet    IRON     Take 1 tablet by mouth daily (with breakfast).       glipiZIDE 10 MG 24 hr tablet    GLUCOTROL XL    180 tablet    Take 2 tablets (20 mg) by mouth every morning       hydrocortisone 0.2 % cream    WESTCORT    45 g    Apply topically 2 times daily as needed Apply sparingly to affected area, BID.       insulin glargine 100 UNIT/ML injection    LANTUS SOLOSTAR    15 mL    Inject 10 units sq q hs       insulin pen needle 31G X 5 MM    B-D U/F    100 each    Use 1 daily or as directed.       levothyroxine 200 MCG tablet    SYNTHROID/LEVOTHROID    90 tablet    Take 1 tablet (200 mcg) by mouth daily       losartan 50 MG tablet    COZAAR    90 tablet    Take 1 tablet (50 mg) by mouth daily       metFORMIN 1000 MG tablet    GLUCOPHAGE    180 tablet    Take 1 tablet (1,000 mg) by mouth 2 times daily (with meals)       Multi-vitamin Tabs tablet   Generic drug:  multivitamin, therapeutic with minerals     30    1 TABLET DAILY       omeprazole-sodium bicarbonate  MG per capsule    ZEGERID     Take 1 capsule by mouth every morning (before breakfast)       * order for DME     1 Units    Equipment being ordered: single cane with rubber foot       * order for DME     1 Units    Equipment being ordered: four pronged cane       simvastatin 20 MG tablet    ZOCOR    90 tablet    Take 1 tablet (20 mg) by mouth At Bedtime       VITAMIN B 12 PO      Take 250 mcg by mouth daily       VITAMIN D (CHOLECALCIFEROL) PO      Take 1,000 Units by mouth daily.       * Notice:  This list has 2 medication(s) that are the same as other medications prescribed for you. Read the directions carefully, and ask your doctor or other care provider to review them with you.

## 2017-05-23 NOTE — NURSING NOTE
"Chief Complaint   Patient presents with     Diabetes       Initial /66 (BP Location: Left arm, Patient Position: Chair, Cuff Size: Adult Regular)  Pulse 84  Temp 98.1  F (36.7  C) (Oral)  Wt 244 lb (110.7 kg)  BMI 40.6 kg/m2 Estimated body mass index is 40.6 kg/(m^2) as calculated from the following:    Height as of 5/17/17: 5' 5\" (1.651 m).    Weight as of this encounter: 244 lb (110.7 kg).  Medication Reconciliation: complete    "

## 2017-05-23 NOTE — PATIENT INSTRUCTIONS
the insulin and pen needles from the pharmacy and bring with you to your appointment with Lucía Bermudez, diabetes educator.    After you see Lucía, start the Lantus insulin, 10 units every evening, about the same time each evening.    Test your blood sugar at least twice daily, in the morning before you eat breakfast and in the evening before you take the Lantus injection.    Bring your meter with to all appointments with me and with diabetes ed.    Follow up with me in one month.    Diana Butler NP  Endocrinology

## 2017-05-23 NOTE — PROGRESS NOTES
"Name: Steve \"Jamie\"Cathy  Seen at the request of Lisa Pierre for Diabetes.  HPI:  Steve Morgan is a 57 year old male who presents for the evaluation/management of:    1. Type 2 DM:  Orginally diagnosed at the age of 33 or 38.  Was prediabetic prior, was not particularly symptomatic at the time, was noted on routine lab work  Current Regimen: Metformin 1000 mg bid, glipzide 20 mg q am  BS checks: Not testing  Average Meter Download: N/a  Thinks increased blood sugar due to dietary indiscretions, states he hasn't been watching his diet as carefully.  Likes to snack between meals, buys snacks at the gas station.    Complications:   Diabetes Complications  Description / Detail    Diabetic Retinopathy  No retinopathy, early cataract, last exam about 1 year ago   CAD / PAD  No   Neuropathy  Yes: numbness hands and feet   Nephropathy / Microalbuminuria  No   Gastroparesis  No   Hypoglycemia [place]  Not sure, gets 'kind of dizzy' when blood sugar is low but reports history of low blood sugars without symptoms     2. Hypertension: Blood Pressure today:   BP Readings from Last 3 Encounters:   05/23/17 148/66   05/17/17 139/89   11/15/16 128/80   .  Blood pressure medications include atenolol 25 mg qd and losartan 50 mg qd  .  Takes medications everyday without forgetting a dose.  Denies feeling lightheaded or dizzy.   3. Hyperlipidemia: Takes simvastatin 20 mg for lipid control.  Denies muscle aches of pains.       4. Prevention:  Flu Shot- 11/2016  Pneumovax- 2012  Opthalmology-Yes: due 6/2017  Dental-Yes: twice a year  ASA-Yes, 81 mg qd (recently forgot to take this but will start taking it again)  Smoking- No  5.  Hypothyroidism. Currently treated with levothyroxine 200 mcg daily.  Takes the levothyroxine first thing in the morning and waits 30+ minutes before eating breakfast.    PMH/PSH:  Past Medical History:   Diagnosis Date     Depression      Hyperlipidemia LDL goal <100 10/31/2010     " Hypertension goal BP (blood pressure) < 140/90 3/17/2011     Hypothyroidism 1/12/2010     Neuropathy in diabetes (H) 1/12/2010     Obesity 1/12/2010     Sleep apnea 1/12/2010    CPAP     Type 2 diabetes, HbA1C goal < 8% (H) 3/8/2011     Past Surgical History:   Procedure Laterality Date     COLONOSCOPY       GENITOURINARY SURGERY      surg for undescended testicle     REPAIR HAMMER TOE BILATERAL  5/16/2013    Procedure: REPAIR HAMMER TOE BILATERAL;  Flexor Tenotomy Toes 2,3,4,5 Bilateral Feet;  Surgeon: Saad Bangura DPM;  Location: [facility] OR     Family Hx:  Family History   Problem Relation Age of Onset     Breast Cancer Mother      Hypertension Mother      Thyroid Disease Mother      Depression Mother      Cancer - colorectal Father      Thyroid Disease Father      Depression Father      HEART DISEASE Maternal Grandmother      CHF     Circulatory Paternal Grandmother      CANCER Paternal Grandfather      DIABETES Brother      DIABETES Brother      Thyroid disease: Yes: mother         DM2: Yes: one brother with type 1 diabetes and one brother with type 2 diabetes, paternal aunt with DM2         Autoimmune: DM1, SLE, RA, Vitiligo Yes: brother with DM1    Social Hx:  Social History     Social History     Marital status:      Spouse name: Sri     Number of children: 2     Years of education: N/A     Occupational History      None      [employer]     Social History Main Topics     Smoking status: Never Smoker     Smokeless tobacco: Never Used     Alcohol use Yes      Comment: occ     Drug use: No     Sexual activity: Yes     Partners: Female     Other Topics Concern     Parent/Sibling W/ Cabg, Mi Or Angioplasty Before 65f 55m? No     Social History Narrative          MEDICATIONS:  has a current medication list which includes the following prescription(s): insulin glargine, insulin pen needle, cephalexin, bupropion, citalopram, simvastatin, atenolol, losartan, levothyroxine, hydrocortisone,  glipizide, metformin, omeprazole-sodium bicarbonate, blood glucose monitoring, blood glucose monitoring, blood glucose monitoring, cyanocobalamin, calcium carb-cholecalciferol, order for dme, order for dme, cholecalciferol, ferrous sulfate, aspirin, and multi-vitamin, and the following Facility-Administered Medications: cefazolin.    ROS     ROS: 10 point ROS neg other than the symptoms noted above in the HPI.    Physical Exam   VS: /66 (BP Location: Left arm, Patient Position: Chair, Cuff Size: Adult Regular)  Pulse 84  Temp 98.1  F (36.7  C) (Oral)  Wt 110.7 kg (244 lb)  BMI 40.6 kg/m2  GENERAL: AXOX3, NAD, well dressed, answering questions appropriately, appears stated age.  HEENT: OP clear, no LAD, no TM, non-tender, no exopthalmous, no proptosis, EOMI, no lig lag, no retraction  CV: RRR, no rubs, gallops, no murmurs  LUNGS: CTAB, no wheezes, rales, or ronchi  ABDOMEN: soft, nontender, nondistended, +BS, no organomegaly  EXTREMITIES: no edema, +pulses, no rashes, no lesions  NEUROLOGY: CN grossly intact, + monofilament, + DTR upper and lower extremity, no tremors  MSK: grossly intact  SKIN: no rashes, no lesions    LABS:  A1c:   Component      Latest Ref Rng & Units 5/19/2016 11/15/2016 12/22/2016 5/17/2017   Hemoglobin A1C      4.3 - 6.0 % 7.5 (H) 8.8 (H) 7.7 (H) 10.0 (H)     Basic Metabolic Panel:  !COMPREHENSIVE Latest Ref Rng & Units 5/19/2016 5/17/2017   SODIUM 133 - 144 mmol/L 129 (L) 131 (L)   POTASSIUM 3.4 - 5.3 mmol/L 4.8 4.6   CHLORIDE 94 - 109 mmol/L 95 98   BUN 7 - 30 mg/dL 17 10   Creatinine 0.66 - 1.25 mg/dL 1.17 1.02   Glucose 70 - 99 mg/dL 176 (H) 368 (H)   ANION GAP 3 - 14 mmol/L 7 9   CALCIUM 8.5 - 10.1 mg/dL 9.6 9.2     LFTS/Cholesterol Panel:    Thyroid Function:   !THYROID Latest Ref Rng & Units 12/22/2016 11/15/2016 5/19/2016   TSH 0.40 - 4.00 mU/L 0.48 11.71 (H) 5.15 (H)   T4 FREE 0.76 - 1.46 ng/dL  1.06 0.94     !THYROID Latest Ref Rng & Units 8/26/2015 6/26/2015 6/26/2014   TSH  0.40 - 4.00 mU/L 6.21 (H) 7.54 (H) 3.69   T4 FREE 0.76 - 1.46 ng/dL 1.04 1.06      Urine MicroAlbumin:    Vitamin D:    All pertinent notes, labs, and images personally reviewed by me.     A/P  Mr.Walter Morgan is a 57 year old here for the evaluation/management of diabetes:    1. DM2 - Uncontrolled.  Start Lantus 10 units q hs.  Adjust dose as needed to target fasting  or lower or he reaches a max dose of 70 units per day.    There is some variability among people, most will usually develop symptoms suggestive of hypoglycemia when blood glucose levels are lowered to the mid 60's. The first set of symptoms are called adrenergic. Patients may experience any of the following nervousness, sweating, intense hunger, trembling, weakness, palpitations, and difficulty speaking.   The acute management of hypoglycemia involves the rapid delivery of a source of easily absorbed sugar. Regular soda, juice, lifesavers, table sugar, are good options. 15 grams of glucose is the dose that is given, followed by an assessment of symptoms and a blood glucose check if possible. If after 10 minutes there is no improvement, another 10-15 grams should be given. This can be repeated up to three times. The equivalency of 10-15 grams of glucose (approximate servings) are: 3-5 hard candies, 3 teaspoons of sugar, or 1/2 cup of regular soda or juice.      2. Hypertension - Variable.      3. Hyperlipidemia - On statin therapy.    Labs ordered today:   Orders Placed This Encounter   Procedures     DIABETES EDUCATOR REFERRAL     PODIATRY/FOOT & ANKLE SURGERY REFERRAL       Radiology/Consults ordered today: DIABETES EDUCATOR REFERRAL  PODIATRY/FOOT & ANKLE SURGERY REFERRAL    All questions were answered.  The patient indicates understanding of the above issues and agrees with the plan set forth.  Total face to face time greater than or equal to 45 minutes.       Follow-up:  1-3 months depending on diabetes ed follow up    Diana Butler  NP  Endocrinology  [facility]  CC: Lisa Pierre

## 2017-06-02 ENCOUNTER — TELEPHONE (OUTPATIENT)
Dept: FAMILY MEDICINE | Facility: CLINIC | Age: 57
End: 2017-06-02

## 2017-06-02 NOTE — TELEPHONE ENCOUNTER
Panel Management Review      Patient has the following on his problem list:     Hypertension   Last three blood pressure readings:  BP Readings from Last 3 Encounters:   05/23/17 148/66   05/17/17 139/89   11/15/16 128/80     Blood pressure: FAILED    HTN Guidelines:  Age 18-59 BP range:  Less than 140/90  Age 60-85 with Diabetes:  Less than 140/90  Age 60-85 without Diabetes:  less than 150/90      Composite cancer screening  Chart review shows that this patient is due/due soon for the following None  Summary:    Patient is due/failing the following:   BP CHECK    Action needed:   Patient needs nurse only appointment.    Type of outreach:    Phone, spoke to patient.  Micheal has an appointment on the 13th     Questions for provider review:    None                                                                                                                                    Yenni Castro MA       Chart routed to no one .

## 2017-06-13 ENCOUNTER — OFFICE VISIT (OUTPATIENT)
Dept: PODIATRY | Facility: CLINIC | Age: 57
End: 2017-06-13
Payer: COMMERCIAL

## 2017-06-13 VITALS
DIASTOLIC BLOOD PRESSURE: 78 MMHG | SYSTOLIC BLOOD PRESSURE: 132 MMHG | BODY MASS INDEX: 40.19 KG/M2 | WEIGHT: 241.2 LBS | HEIGHT: 65 IN

## 2017-06-13 DIAGNOSIS — L84 PRE-ULCERATIVE CALLUSES: ICD-10-CM

## 2017-06-13 DIAGNOSIS — M21.42 PES PLANUS OF BOTH FEET: ICD-10-CM

## 2017-06-13 DIAGNOSIS — E11.42 DIABETIC POLYNEUROPATHY ASSOCIATED WITH TYPE 2 DIABETES MELLITUS (H): Primary | ICD-10-CM

## 2017-06-13 DIAGNOSIS — M21.41 PES PLANUS OF BOTH FEET: ICD-10-CM

## 2017-06-13 PROCEDURE — 99203 OFFICE O/P NEW LOW 30 MIN: CPT | Performed by: PODIATRIST

## 2017-06-13 RX ORDER — AMMONIUM LACTATE 12 G/100G
CREAM TOPICAL 2 TIMES DAILY PRN
Qty: 385 G | Refills: 3 | Status: ON HOLD | OUTPATIENT
Start: 2017-06-13 | End: 2022-12-23

## 2017-06-13 NOTE — NURSING NOTE
"Chief Complaint   Patient presents with     Foot Problems     callus on both feet        Initial /78  Ht 5' 5\" (1.651 m)  Wt 241 lb 3.2 oz (109.4 kg)  BMI 40.14 kg/m2 Estimated body mass index is 40.14 kg/(m^2) as calculated from the following:    Height as of this encounter: 5' 5\" (1.651 m).    Weight as of this encounter: 241 lb 3.2 oz (109.4 kg).  Medication Reconciliation: complete   Bimal Cullen MA      "

## 2017-06-13 NOTE — LETTER
6/13/2017       RE: Steve Morgan  [id number]  [facility] [address]           Dear Colleague,    Thank you for referring your patient, Steve Morgan, to the [facility]. Please see a copy of my visit note below.    PATIENT HISTORY:  Steve Morgan is a 57 year old male who presents to clinic for callus to the left foot. Notes he is diabetic. It keeps coming back and he is wondering what can be done to get rid of it as he is concerned for infection. Denies fever, chills, injury. Notes numbness to feet.     Review of Systems:  Patient denies fever, chills, rash, wound, stiffness, limping,weakness, heart burn, blood in stool, chest pain with activity, calf pain when walking, shortness of breath with activity, chronic cough, easy bleeding/bruising, swelling of ankles, excessive thirst, fatigue, depression, anxiety.  Patient admits to numbness.     PAST MEDICAL HISTORY:   Past Medical History:   Diagnosis Date     Depression      Hyperlipidemia LDL goal <100 10/31/2010     Hypertension goal BP (blood pressure) < 140/90 3/17/2011     Hypothyroidism 1/12/2010     Neuropathy in diabetes (H) 1/12/2010     Obesity 1/12/2010     Sleep apnea 1/12/2010    CPAP     Type 2 diabetes, HbA1C goal < 8% (H) 3/8/2011        PAST SURGICAL HISTORY:   Past Surgical History:   Procedure Laterality Date     COLONOSCOPY       GENITOURINARY SURGERY      surg for undescended testicle     REPAIR HAMMER TOE BILATERAL  5/16/2013    Procedure: REPAIR HAMMER TOE BILATERAL;  Flexor Tenotomy Toes 2,3,4,5 Bilateral Feet;  Surgeon: Saad Bangura DPM;  Location:  OR        MEDICATIONS:   Current Outpatient Prescriptions:      insulin glargine (LANTUS SOLOSTAR) 100 UNIT/ML injection, Inject 10 units sq q hs, Disp: 15 mL, Rfl: 3     insulin pen needle (B-D U/F) 31G X 5 MM, Use 1 daily or as directed., Disp: 100 each, Rfl: prn     buPROPion (WELLBUTRIN XL) 300 MG 24 hr tablet, Take 1 tablet (300 mg) by mouth every  morning, Disp: 90 tablet, Rfl: 1     citalopram (CELEXA) 20 MG tablet, Take 1 tablet (20 mg) by mouth daily, Disp: 90 tablet, Rfl: 1     simvastatin (ZOCOR) 20 MG tablet, Take 1 tablet (20 mg) by mouth At Bedtime, Disp: 90 tablet, Rfl: 3     atenolol (TENORMIN) 25 MG tablet, Take 1 tablet (25 mg) by mouth every morning, Disp: 90 tablet, Rfl: 1     losartan (COZAAR) 50 MG tablet, Take 1 tablet (50 mg) by mouth daily, Disp: 90 tablet, Rfl: 0     levothyroxine (SYNTHROID/LEVOTHROID) 200 MCG tablet, Take 1 tablet (200 mcg) by mouth daily, Disp: 90 tablet, Rfl: 1     hydrocortisone (WESTCORT) 0.2 % cream, Apply topically 2 times daily as needed Apply sparingly to affected area, BID., Disp: 45 g, Rfl: 1     glipiZIDE (GLUCOTROL XL) 10 MG 24 hr tablet, Take 2 tablets (20 mg) by mouth every morning, Disp: 180 tablet, Rfl: 1     metFORMIN (GLUCOPHAGE) 1000 MG tablet, Take 1 tablet (1,000 mg) by mouth 2 times daily (with meals), Disp: 180 tablet, Rfl: 1     omeprazole-sodium bicarbonate (ZEGERID)  MG per capsule, Take 1 capsule by mouth every morning (before breakfast), Disp: , Rfl:      blood glucose (ONE TOUCH VERIO IQ) test strip, Use to test blood sugars 2 times daily or as directed., Disp: 100 strip, Rfl: 0     blood glucose monitoring (ONETOUCH VERIO) meter device kit, Use to test blood sugars 2 times daily or as directed., Disp: 1 kit, Rfl: 0     blood glucose monitoring (ONE TOUCH DELICA) lancets, Use to test blood sugars 2 times daily or as directed., Disp: 1 Box, Rfl: prn     Cyanocobalamin (VITAMIN B 12 PO), Take 250 mcg by mouth daily, Disp: , Rfl:      Calcium Carb-Cholecalciferol (CALCIUM 600 + D PO), Take 1 tablet by mouth daily, Disp: , Rfl:      ORDER FOR DME, Equipment being ordered: four pronged cane, Disp: 1 Units, Rfl: 0     ORDER FOR DME, Equipment being ordered: single cane with rubber foot, Disp: 1 Units, Rfl: 0     VITAMIN D, CHOLECALCIFEROL, PO, Take 1,000 Units by mouth daily., Disp: , Rfl:  "     ferrous sulfate 325 (65 FE) MG tablet, Take 1 tablet by mouth daily (with breakfast)., Disp: , Rfl:      ASPIRIN 81 MG OR TABS, ONE DAILY, Disp: 100, Rfl: 3     MULTI-VITAMIN OR TABS, 1 TABLET DAILY, Disp: 30, Rfl: 0     cephALEXin (KEFLEX) 500 MG capsule, Take 1 capsule (500 mg) by mouth 3 times daily (Patient not taking: Reported on 6/13/2017), Disp: 30 capsule, Rfl: 0  No current facility-administered medications for this visit.     Facility-Administered Medications Ordered in Other Visits:      ceFAZolin (ANCEF) 1 g vial to attach to IVPB, , , PRN, Stock, Rojelio, APRN CRNA, 2 g at 05/16/13 1206     ALLERGIES:  No Known Allergies     SOCIAL HISTORY:   Social History     Social History     Marital status:      Spouse name: Sri     Number of children: 2     Years of education: N/A     Occupational History      None      [employer]     Social History Main Topics     Smoking status: Never Smoker     Smokeless tobacco: Never Used     Alcohol use Yes      Comment: occ     Drug use: No     Sexual activity: Yes     Partners: Female     Other Topics Concern     Parent/Sibling W/ Cabg, Mi Or Angioplasty Before 65f 55m? No     Social History Narrative        FAMILY HISTORY:   Family History   Problem Relation Age of Onset     Breast Cancer Mother      Hypertension Mother      Thyroid Disease Mother      Depression Mother      Cancer - colorectal Father      Thyroid Disease Father      Depression Father      HEART DISEASE Maternal Grandmother      CHF     Circulatory Paternal Grandmother      CANCER Paternal Grandfather      DIABETES Brother      DIABETES Brother         EXAM:Vitals: /78  Ht 5' 5\" (1.651 m)  Wt 241 lb 3.2 oz (109.4 kg)  BMI 40.14 kg/m2    General appearance: Patient is alert and fully cooperative with history & exam.  No sign of distress is noted during the visit.     Psychiatric: Affect is pleasant & appropriate.  Patient appears motivated to improve health.     Respiratory: " Breathing is regular & unlabored while sitting.     HEENT: Hearing is intact to spoken word.  Speech is clear.  No gross evidence of visual impairment that would impact ambulation.     Dermatologic:localized pre ulcerative hyperkeratotic lesion medial left 1st metatarsal head. No open lesions or signs of infection.     Vascular: DP & PT pulses are intact & regular bilaterally.  edema and varicosities noted.  CFT and skin temperature is normal to both lower extremities.     Neurologic: Lower extremity sensation is absent     Musculoskeletal: Patient is ambulatory without assistive device or brace.  No gross ankle deformity noted.  No foot or ankle joint effusion is noted.     ASSESSMENT:     Diabetic polyneuropathy associated with type 2 diabetes mellitus (H)  Pre-ulcerative calluses  Pes planus of both feet     PLAN:  Reviewed patient's chart in epic. Talked about proper diabetic foot care. Discussed causes of keratomas.  They are due to areas of increase friction.  Hammertoes can create these as they put more pressure to the metatarsal head.  Discussed treatments such as using foot file, pumice stone, metatarsal pads, orthotics, and not walking barefoot.     Recommend orthotics and amlactin. Was given orders for both. Was told to call with questions or concerns.        Tena Mathis DPM, Podiatry/Foot and Ankle Surgery    Weight management plan: Patient was referred to their PCP to discuss a diet and exercise plan.      Again, thank you for allowing me to participate in the care of your patient.        Sincerely,              Tena Mathis DPM, Podiatry/Foot and Ankle Surgery

## 2017-06-13 NOTE — MR AVS SNAPSHOT
After Visit Summary   6/13/2017    Steve Morgan    MRN: [id number]           Patient Information     Date Of Birth          1960        Visit Information        Provider Department      6/13/2017 1:15 PM Tena Mathis DPM, Podiatry/Foot and Ankle Surgery [facility]        Today's Diagnoses     Diabetic polyneuropathy associated with type 2 diabetes mellitus (H)    -  1    Pre-ulcerative calluses        Pes planus of both feet          Care Instructions      DR. MATHIS'S CLINIC LOCATIONS:   MONDAY AM - [place] TUESDAY - [place]   [address] [address] [address] [address] [place] [address]   [phone] / [phone] / [phone]       WEDNESDAY - [place] FRIDAY AM - [facility]   [address]   [place] [address] [place] [address]   [phone] / [phone]       FRIDAY PM - [place] SCHEDULE SURGERY: [phone]   [address] BILLING QUESTIONS: [phone]   [address] [address] AFTER HOURS: [phone]   [phone] / [phone] APPOINTMENTS: [phone]         Body Mass Index (BMI)  Many things can cause foot and ankle problems. Foot structure, activity level, foot mechanics and injuries are common causes of pain.  One very important issue that often goes unmentioned, is body weight.  Extra weight can cause increased stress on muscles, ligaments, bones and tendons.  Sometimes just a few extra pounds is all it takes to put one over her/his threshold. Without reducing that stress, it can be difficult to alleviate pain. Some people are uncomfortable addressing this issue, but we feel it is important for you to think about it. As Foot &  Ankle specialists, our job is addressing the lower extremity problem and possible causes. Regarding extra body weight, we encourage patients to discuss diet and weight management plans with their primary care doctors. It is  this team approach that gives you the best opportunity for pain relief and getting you back on your feet.        CALLUS / CORNS / IPKs  When there is excessive friction or pressure on the skin, the body responds by making the skin thicker to protect the deeper structures from becoming exposed. While this works well to protect the deeper structures, the thickened skin can increase pressure and pain.    CALLUS: Flat, diffuse thickening are simple calluses and they are usually caused by friction. Often these are the result of rubbing on a shoe or going barefoot.    CORNS: Calluses with a central core between the toes are called corns. These result from prominent joints on adjacent toes rubbing together. Theses are a symptom of bone malalignment and will always recur unless the underlying bones are addressed surgically.    IPKs: Calluses with a central core on the ball of the foot are usually IPKs (intractable plantar keratosis). These are caused by excessive pressure from the metatarsals, the bones that make up the ball of the foot. Often one of these bones is too long or too prominent.  Again, these will always recur unless the underlying bone issue is addressed. There is no cure for these. They will either go away by themselves, recur, or more could develop.    ROUTINE MAINTENANCE  1. File them down with a pumice stone or callus file a couple times a week.   2. An electric callus removing device. Amope Pedi Perfect Electronic Pedicure Foot File and Callus Remover can be a good option.   3. Lotion can be applied to soften the callus. A urea based cream such as Kersal or Vanicream or thicker cream with shea butter are good options.  4. Toe spacers or toe covers can be used for corns, gel pads can be used for other lesions on the bottom of the foot.   If there is a surgical pathology noted, such as a prominent bone, often this needs to be addressed surgically to minimize recurrence. However, sometimes the lesion simply  migrates to another spot after surgery, so it is not a guaranteed cure.         NAIL FUNGUS / ONYCHOMYCOSIS   Nail fungus is not a hygiene problem and will not likely lead to significant medical   problems. The nails may get thick causing pain and possibly local skin infection.   Treatments include debridement (trimming), oral antifungals, topical antifungals and complete removal of the nail. Most fungal nails are not treated.   Topicals such as tea tree oil can be helpful for surface fungus and may, at best, limit   progression. Over the counter creams (such as Lamisil) can also be used however, their effectiveness is also quite low.  Topical treatment with Pen lac is expensive and often not covered by insurance. Pen lac has an approximate 8% success rate. Topical therapy recommendations is to apply twice a day for at least 3-4 months as it takes 9 months for new nail to grow out.    Experts suggest soaking your feet for 15 to 20 minutes in a mixture of 1 cup vinegar to 4 cups warm water. Be sure to rinse well and pat your feet dry when you're done. You can soak your feet like this daily. But if your skin becomes irritated, try soaking only two to three times a week. Vicks VapoRub, as with vinegar, there have been no controlled clinical trials to assess the effectiveness of Vicks VapoRub on nail fungus, but there have been numerous anecdotal reports that it works. There's no consensus on how often to apply this product, so check with your doctor before using it on your nails.     Oral therapies include Sporanox and Lamisil. Oral therapies are also expensive and not very effective. Side effects such as liver disease are the main concern. Return of fungus is common even if the treatment worked.     Other Tips:  - Penlac nail medication apply daily x 4 months; remove old polish first day of each week  - Antifungal cream/powder (Zeasorb) - apply daily to feet and shoes x 2 months  - Clean shoes with Lysol or in washing  machine every few weeks  - Rotate shoe gear; give them 24 hours to dry out between days wearing them  - Clean pair of socks in morning, clean pair in afternoon if your feet sweat  - Shower shoes used in public showers/pools  [place] [facility]UST[facility] LOCATIONS  [facility]  [address]  [place] [address]  Phone: [phone]  Fax: [phone] [facility]  [address]  [place] [address]  Phone: [phone]   Fax: [phone]   [facility]  [address]  [place] [address]  Phone: [phone]  Fax: [phone] [facility]  [address]  [place] [address]  Phone: [phone]   Fax: [phone]   [facility]   [address]  [place] [address]  Phone: [phone]   Fax: [phone] * Please call any location listed to make an appointment for a casting/fitting. Your referral was sent to their central office and they will all have the order on file.     WEARING YOUR CUSTOM FOOT ORTHOTICS   Most insurance plans cover one pair of orthotics per year. You must check with your   insurance plan to see what your payment responsibility will be. Please call your   insurance company by calling the number on the back of your insurance card.   Orthotic's are non-refundable and non-returnable.   Orthotics are made of various designs. Some orthotics are covered with material that extends beyond your toes. If your orthotic is of this design, you will likely need to trim the toe end to get a proper fit. The insole from your shoe can be used as a template. Simply overlay the shoe insert on top of the custom orthotic. Align the heel end while tracing the length of the insert onto the custom orthotic. Use a large scissor to trim the toe end until you get a proper fit in the shoe.   The orthotic needs to be pushed as far back in the shoe as possible. The  heel portion should not ride forward so as not to irritate your heel.   Orthotics are designed to work with socks. Excessive perspiration will shorten the life span of the orthotics. Remove the orthotic from the shoe frequently for proper drying.   The break-in period lasts for weeks. People new to orthotics will likely experience new aches and pains. The orthotic is forcing your foot into a new position. Arch, foot and leg muscle aches and fatigue are common during these weeks. Minor discomfort can be considered normal break in phenomenon. Start wearing your orthotic around your home your first day. Limited activity for one to two hours is recommended. You can increase one or two additional hours each day provided the aches and pains are subsiding. The degree of discomfort, fatigue and problems will dictate the speed of break in. You may require multiple weeks to work up to full time use.   Do not continue wearing your orthotics if they are creating problems such as blisters or sores. Do not hesitate to call the clinic to speak with a nurse regarding orthotic   break in, fit, trimming, etc. You may also need to see the doctor if the orthotics are   simply not working out. Adjustments are sometimes made to improve orthotic   function.     Orthotics will only work in certain styles and types of shoes. Orthotics rarely work in dress shoes. Slip-ons, clogs, sandals and heels are particularly troublesome. Specially designed orthotics may be necessary for these types of shoes. Your custom orthotic was designed for activities that require appropriate walking or running shoes. Lace up athletic shoes, walking shoes or work boots should work appropriately. You may need a wider or longer shoe. Shoes with a removable  or insert work best. In general, you want to remove an insert from the shoe before placing the orthotic into the shoe. Shoes without a removable liner may not work as well.     When purchasing new  shoes, bring your orthotics along to get a proper fit. Shop at stores that are familiar with orthotics.   Frequent washing of the orthotic may shorten the life span of the top cover. The top cover can be replaced but will generally last one to five years depending on use and foot perspiration.     FOOT CARE NURSES  If you are interested in having a foot care nurse come out to your   home, please call one of these contacts for more information:  [facility]  [phone] [facility]  [phone]   [facility]  [phone]  [place] [phone]  [place]   [facility]  [phone]  At [facility] [facility] [phone]                 Follow-ups after your visit        Your next 10 appointments already scheduled     Jun 14, 2017  8:30 AM CDT   Diabetic Education with Lucía Chávez   [facility]    [address]  [facility] [phone]            Jun 20, 2017  1:00 PM CDT   Return Visit with BRIDGET Payne CNP   [facility]    [address]  [facility] [phone]            Jun 21, 2017  9:30 AM CDT   Diabetic Education with Lucía Chávez   [facility]    [address]  [facility] [phone]              Who to contact     If you have questions or need follow up information about today's clinic visit or your schedule please contact [facility] directly at [phone].  Normal or non-critical lab and imaging results will be communicated to you by MyChart, letter or phone within 4 business days after the clinic has received the results. If you do not hear from us within 7 days, please contact the clinic through MyChart or phone. If you have a critical or abnormal lab result, we will notify you  "by phone as soon as possible.  Submit refill requests through [employer] or call your pharmacy and they will forward the refill request to us. Please allow 3 business days for your refill to be completed.          Additional Information About Your Visit        [employer] [facility] Information     [employer] gives you secure access to your electronic health record. If you see a primary care provider, you can also send messages to your care team and make appointments. If you have questions, please call your primary care clinic.  If you do not have a primary care provider, please call [phone] and they will assist you.        Care EveryWhere ID     This is your Care EveryWhere ID. This could be used by other organizations to access your [facility] medical records  [id number]        Your Vitals Were     Height BMI (Body Mass Index)                5' 5\" (1.651 m) 40.14 kg/m2           Blood Pressure from Last 3 Encounters:   06/13/17 132/78   05/23/17 148/66   05/17/17 139/89    Weight from Last 3 Encounters:   06/13/17 241 lb 3.2 oz (109.4 kg)   05/23/17 244 lb (110.7 kg)   05/17/17 236 lb 9.6 oz (107.3 kg)              Today, you had the following     No orders found for display       Primary Care Provider Office Phone # Fax #    Lisa Sue Pierre PA-C [phone]       [address] [address]        Thank you!     Thank you for choosing [facility]  for your care. Our goal is always to provide you with excellent care. Hearing back from our patients is one way we can continue to improve our services. Please take a few minutes to complete the written survey that you may receive in the mail after your visit with us. Thank you!             Your Updated Medication List - Protect others around you: Learn how to safely use, store and throw away your medicines at www.disposemymeds.org.          This list is accurate as of: 6/13/17  1:16 PM.  Always use your most " recent med list.                   Brand Name Dispense Instructions for use    aspirin 81 MG tablet     100    ONE DAILY       atenolol 25 MG tablet    TENORMIN    90 tablet    Take 1 tablet (25 mg) by mouth every morning       blood glucose monitoring lancets     1 Box    Use to test blood sugars 2 times daily or as directed.       blood glucose monitoring meter device kit     1 kit    Use to test blood sugars 2 times daily or as directed.       blood glucose monitoring test strip    ONE TOUCH VERIO IQ    100 strip    Use to test blood sugars 2 times daily or as directed.       buPROPion 300 MG 24 hr tablet    WELLBUTRIN XL    90 tablet    Take 1 tablet (300 mg) by mouth every morning       CALCIUM 600 + D PO      Take 1 tablet by mouth daily       cephALEXin 500 MG capsule    KEFLEX    30 capsule    Take 1 capsule (500 mg) by mouth 3 times daily       citalopram 20 MG tablet    celeXA    90 tablet    Take 1 tablet (20 mg) by mouth daily       ferrous sulfate 325 (65 FE) MG tablet    IRON     Take 1 tablet by mouth daily (with breakfast).       glipiZIDE 10 MG 24 hr tablet    GLUCOTROL XL    180 tablet    Take 2 tablets (20 mg) by mouth every morning       hydrocortisone 0.2 % cream    WESTCORT    45 g    Apply topically 2 times daily as needed Apply sparingly to affected area, BID.       insulin glargine 100 UNIT/ML injection    LANTUS SOLOSTAR    15 mL    Inject 10 units sq q hs       insulin pen needle 31G X 5 MM    B-D U/F    100 each    Use 1 daily or as directed.       levothyroxine 200 MCG tablet    SYNTHROID/LEVOTHROID    90 tablet    Take 1 tablet (200 mcg) by mouth daily       losartan 50 MG tablet    COZAAR    90 tablet    Take 1 tablet (50 mg) by mouth daily       metFORMIN 1000 MG tablet    GLUCOPHAGE    180 tablet    Take 1 tablet (1,000 mg) by mouth 2 times daily (with meals)       Multi-vitamin Tabs tablet   Generic drug:  multivitamin, therapeutic with minerals     30    1 TABLET DAILY        omeprazole-sodium bicarbonate  MG per capsule    ZEGERID     Take 1 capsule by mouth every morning (before breakfast)       * order for DME     1 Units    Equipment being ordered: single cane with rubber foot       * order for DME     1 Units    Equipment being ordered: four pronged cane       simvastatin 20 MG tablet    ZOCOR    90 tablet    Take 1 tablet (20 mg) by mouth At Bedtime       VITAMIN B 12 PO      Take 250 mcg by mouth daily       VITAMIN D (CHOLECALCIFEROL) PO      Take 1,000 Units by mouth daily.       * Notice:  This list has 2 medication(s) that are the same as other medications prescribed for you. Read the directions carefully, and ask your doctor or other care provider to review them with you.

## 2017-06-13 NOTE — PROGRESS NOTES
PATIENT HISTORY:  Steve Morgan is a 57 year old male who presents to clinic for callus to the left foot. Notes he is diabetic. It keeps coming back and he is wondering what can be done to get rid of it as he is concerned for infection. Denies fever, chills, injury. Notes numbness to feet.     Review of Systems:  Patient denies fever, chills, rash, wound, stiffness, limping,weakness, heart burn, blood in stool, chest pain with activity, calf pain when walking, shortness of breath with activity, chronic cough, easy bleeding/bruising, swelling of ankles, excessive thirst, fatigue, depression, anxiety.  Patient admits to numbness.     PAST MEDICAL HISTORY:   Past Medical History:   Diagnosis Date     Depression      Hyperlipidemia LDL goal <100 10/31/2010     Hypertension goal BP (blood pressure) < 140/90 3/17/2011     Hypothyroidism 1/12/2010     Neuropathy in diabetes (H) 1/12/2010     Obesity 1/12/2010     Sleep apnea 1/12/2010    CPAP     Type 2 diabetes, HbA1C goal < 8% (H) 3/8/2011        PAST SURGICAL HISTORY:   Past Surgical History:   Procedure Laterality Date     COLONOSCOPY       GENITOURINARY SURGERY      surg for undescended testicle     REPAIR HAMMER TOE BILATERAL  5/16/2013    Procedure: REPAIR HAMMER TOE BILATERAL;  Flexor Tenotomy Toes 2,3,4,5 Bilateral Feet;  Surgeon: Saad Bangura DPM;  Location:  OR        MEDICATIONS:   Current Outpatient Prescriptions:      insulin glargine (LANTUS SOLOSTAR) 100 UNIT/ML injection, Inject 10 units sq q hs, Disp: 15 mL, Rfl: 3     insulin pen needle (B-D U/F) 31G X 5 MM, Use 1 daily or as directed., Disp: 100 each, Rfl: prn     buPROPion (WELLBUTRIN XL) 300 MG 24 hr tablet, Take 1 tablet (300 mg) by mouth every morning, Disp: 90 tablet, Rfl: 1     citalopram (CELEXA) 20 MG tablet, Take 1 tablet (20 mg) by mouth daily, Disp: 90 tablet, Rfl: 1     simvastatin (ZOCOR) 20 MG tablet, Take 1 tablet (20 mg) by mouth At Bedtime, Disp: 90 tablet, Rfl: 3     atenolol  (TENORMIN) 25 MG tablet, Take 1 tablet (25 mg) by mouth every morning, Disp: 90 tablet, Rfl: 1     losartan (COZAAR) 50 MG tablet, Take 1 tablet (50 mg) by mouth daily, Disp: 90 tablet, Rfl: 0     levothyroxine (SYNTHROID/LEVOTHROID) 200 MCG tablet, Take 1 tablet (200 mcg) by mouth daily, Disp: 90 tablet, Rfl: 1     hydrocortisone (WESTCORT) 0.2 % cream, Apply topically 2 times daily as needed Apply sparingly to affected area, BID., Disp: 45 g, Rfl: 1     glipiZIDE (GLUCOTROL XL) 10 MG 24 hr tablet, Take 2 tablets (20 mg) by mouth every morning, Disp: 180 tablet, Rfl: 1     metFORMIN (GLUCOPHAGE) 1000 MG tablet, Take 1 tablet (1,000 mg) by mouth 2 times daily (with meals), Disp: 180 tablet, Rfl: 1     omeprazole-sodium bicarbonate (ZEGERID)  MG per capsule, Take 1 capsule by mouth every morning (before breakfast), Disp: , Rfl:      blood glucose (ONE TOUCH VERIO IQ) test strip, Use to test blood sugars 2 times daily or as directed., Disp: 100 strip, Rfl: 0     blood glucose monitoring (ONETOUCH VERIO) meter device kit, Use to test blood sugars 2 times daily or as directed., Disp: 1 kit, Rfl: 0     blood glucose monitoring (ONE TOUCH DELICA) lancets, Use to test blood sugars 2 times daily or as directed., Disp: 1 Box, Rfl: prn     Cyanocobalamin (VITAMIN B 12 PO), Take 250 mcg by mouth daily, Disp: , Rfl:      Calcium Carb-Cholecalciferol (CALCIUM 600 + D PO), Take 1 tablet by mouth daily, Disp: , Rfl:      ORDER FOR DME, Equipment being ordered: four pronged cane, Disp: 1 Units, Rfl: 0     ORDER FOR DME, Equipment being ordered: single cane with rubber foot, Disp: 1 Units, Rfl: 0     VITAMIN D, CHOLECALCIFEROL, PO, Take 1,000 Units by mouth daily., Disp: , Rfl:      ferrous sulfate 325 (65 FE) MG tablet, Take 1 tablet by mouth daily (with breakfast)., Disp: , Rfl:      ASPIRIN 81 MG OR TABS, ONE DAILY, Disp: 100, Rfl: 3     MULTI-VITAMIN OR TABS, 1 TABLET DAILY, Disp: 30, Rfl: 0     cephALEXin (KEFLEX) 500 MG  "capsule, Take 1 capsule (500 mg) by mouth 3 times daily (Patient not taking: Reported on 6/13/2017), Disp: 30 capsule, Rfl: 0  No current facility-administered medications for this visit.     Facility-Administered Medications Ordered in Other Visits:      ceFAZolin (ANCEF) 1 g vial to attach to IVPB, , , PRN, Stock, Rojelio, APRN CRNA, 2 g at 05/16/13 1206     ALLERGIES:  No Known Allergies     SOCIAL HISTORY:   Social History     Social History     Marital status:      Spouse name: Sri     Number of children: 2     Years of education: N/A     Occupational History      None      [employer]     Social History Main Topics     Smoking status: Never Smoker     Smokeless tobacco: Never Used     Alcohol use Yes      Comment: occ     Drug use: No     Sexual activity: Yes     Partners: Female     Other Topics Concern     Parent/Sibling W/ Cabg, Mi Or Angioplasty Before 65f 55m? No     Social History Narrative        FAMILY HISTORY:   Family History   Problem Relation Age of Onset     Breast Cancer Mother      Hypertension Mother      Thyroid Disease Mother      Depression Mother      Cancer - colorectal Father      Thyroid Disease Father      Depression Father      HEART DISEASE Maternal Grandmother      CHF     Circulatory Paternal Grandmother      CANCER Paternal Grandfather      DIABETES Brother      DIABETES Brother         EXAM:Vitals: /78  Ht 5' 5\" (1.651 m)  Wt 241 lb 3.2 oz (109.4 kg)  BMI 40.14 kg/m2    General appearance: Patient is alert and fully cooperative with history & exam.  No sign of distress is noted during the visit.     Psychiatric: Affect is pleasant & appropriate.  Patient appears motivated to improve health.     Respiratory: Breathing is regular & unlabored while sitting.     HEENT: Hearing is intact to spoken word.  Speech is clear.  No gross evidence of visual impairment that would impact ambulation.     Dermatologic:localized pre ulcerative hyperkeratotic lesion medial " left 1st metatarsal head. No open lesions or signs of infection.     Vascular: DP & PT pulses are intact & regular bilaterally.  edema and varicosities noted.  CFT and skin temperature is normal to both lower extremities.     Neurologic: Lower extremity sensation is absent     Musculoskeletal: Patient is ambulatory without assistive device or brace.  No gross ankle deformity noted.  No foot or ankle joint effusion is noted.     ASSESSMENT:     Diabetic polyneuropathy associated with type 2 diabetes mellitus (H)  Pre-ulcerative calluses  Pes planus of both feet     PLAN:  Reviewed patient's chart in epic. Talked about proper diabetic foot care. Discussed causes of keratomas.  They are due to areas of increase friction.  Hammertoes can create these as they put more pressure to the metatarsal head.  Discussed treatments such as using foot file, pumice stone, metatarsal pads, orthotics, and not walking barefoot.     Recommend orthotics and amlactin. Was given orders for both. Was told to call with questions or concerns.        Tena Mathis DPM, Podiatry/Foot and Ankle Surgery    Weight management plan: Patient was referred to their PCP to discuss a diet and exercise plan.

## 2017-06-15 ENCOUNTER — ALLIED HEALTH/NURSE VISIT (OUTPATIENT)
Dept: EDUCATION SERVICES | Facility: CLINIC | Age: 57
End: 2017-06-15
Payer: COMMERCIAL

## 2017-06-15 DIAGNOSIS — E11.40 TYPE 2 DIABETES MELLITUS WITH DIABETIC NEUROPATHY, UNSPECIFIED LONG TERM INSULIN USE STATUS: Primary | ICD-10-CM

## 2017-06-15 PROCEDURE — G0108 DIAB MANAGE TRN  PER INDIV: HCPCS | Performed by: DIETITIAN, REGISTERED

## 2017-06-15 NOTE — NURSING NOTE
Diabetes Self Management Training: Individual Review Visit    Steve Morgan presents today for initiation of insulin related to Type 2 diabetes.    He is accompanied by self    Patient's diabetes management related comments/concerns: patient concerned about blood sugar control, ready to start insulin    Patient's emotional response to diabetes: expresses readiness to learn and concern for health and well-being    Patient would like this visit to be focused around the following diabetes-related behaviors and goals: Healthy Eating and Taking Medication    ASSESSMENT:  Patient Problem List and Family Medical History reviewed for relevant medical history, current medical status, and diabetes risk factors.    Current Diabetes Management per Patient:       Diabetes Medication(s)     Biguanides Sig    metFORMIN (GLUCOPHAGE) 1000 MG tablet Take 1 tablet (1,000 mg) by mouth 2 times daily (with meals)    Insulin Sig    insulin glargine (LANTUS SOLOSTAR) 100 UNIT/ML injection Inject 10 units sq q hs    Sulfonylureas Sig    glipiZIDE (GLUCOTROL XL) 10 MG 24 hr tablet Take 2 tablets (20 mg) by mouth every morning        Problems taking diabetes medications regularly? No  Side effects? No     Past Diabetes Education: Yes    Patient glucose self monitoring as follows: started testing again- using a new meter.   BG results:   Date Before Breakfast Before Lunch Before Dinner Bedtime   6/14 256      6/13 236 184     6/12    315     BG values are: Not in goal  Patient's most recent   Lab Results   Component Value Date    A1C 10.0 05/17/2017    is not meeting goal of <7.0    Nutrition:  Patient eats 2 meals per day    Breakfast - bowl of cereal (rice krispies or cheerios) - is omitting sugar covered cereal, yogurt  Lunch - skips   Dinner - tacos OR humburger gravy with potatoes and corn OR steak with potatoes   Snacks - pkg of marco crackers    Beverages: water, sometimes milk                                                  "    Cultural/Uatsdin diet restrictions: No     Biggest Challenge to Healthy Eating: portion control    Physical Activity:    Limitations: neuropathy  Wants to start walking more    Diabetes Complications:  Not discussed today.    Vitals:  There were no vitals taken for this visit.  Estimated body mass index is 40.14 kg/(m^2) as calculated from the following:    Height as of 6/13/17: 1.651 m (5' 5\").    Weight as of 6/13/17: 109.4 kg (241 lb 3.2 oz).   Last 3 BP:   BP Readings from Last 3 Encounters:   06/13/17 132/78   05/23/17 148/66   05/17/17 139/89       History   Smoking Status     Never Smoker   Smokeless Tobacco     Never Used       Labs:  Lab Results   Component Value Date    A1C 10.0 05/17/2017     Lab Results   Component Value Date     05/17/2017     Lab Results   Component Value Date     05/17/2017     HDL Cholesterol   Date Value Ref Range Status   05/17/2017 30 (L) >39 mg/dL Final   ]  GFR Estimate   Date Value Ref Range Status   05/17/2017 75 >60 mL/min/1.7m2 Final     Comment:     Non  GFR Calc     GFR Estimate If Black   Date Value Ref Range Status   05/17/2017 >90   GFR Calc   >60 mL/min/1.7m2 Final     Lab Results   Component Value Date    CR 1.02 05/17/2017     No results found for: MICROALBUMIN    Socio/Economic Considerations:    Support system: spouse/significant other    Health Beliefs and Attitudes:   Patient Activation Measure Survey Score:  SHANNON Score (Last Two) 3/8/2011 3/30/2017   SHANNON Raw Score 44 29   Activation Score 70.8 52.9   SHANNON Level 4 2       Stage of Change: PREPARATION (Decided to change - considering how)      Diabetes knowledge and skills assessment:     Patient is knowledgeable in diabetes management concepts related to: Monitoring, Problem Solving and Healthy Coping    Patient needs further education on the following diabetes management concepts: Healthy Eating, Taking Medication and Problem Solving    Barriers to Learning " Assessment: No Barriers identified    Based on learning assessment above, most appropriate setting for further diabetes education would be: Individual setting.    INTERVENTION:   Education provided today on:  AADE Self-Care Behaviors:  Healthy Eating: carbohydrate counting, consistency in amount, composition, and timing of food intake, weight reduction, portion control and label reading  Taking Medication: action of prescribed medication, drawing up, administering and storing injectable diabetes medications, proper site selection and rotation for injections, side effects of prescribed medications and when to take medications  Problem Solving: high blood glucose - causes, signs/symptoms, treatment and prevention, low blood glucose - causes, signs/symptoms, treatment and prevention and carrying a carbohydrate source at all times    Insulin injection technique taught using a Pen for Lantus - 10 units at bedtime. Patient verbalized understanding and was able to perform an accurate return demonstration of injection technique.  Discussed mixing insulin, storage, sharps, new needle each use, site selection, action of insulin and hypoglycemia signs, symptoms and treatment.      Opportunities for ongoing education and support in diabetes-self management were discussed.    Pt verbalized understanding of concepts discussed and recommendations provided today.       Education Materials Provided:  Carbohydrate Counting, Medication Information on Lantus insulin, Hypoglycemia Signs and Symptoms and DMV form for insulin treated diabetes    PLAN:  Meal Plan Recommendation: eat 3 meals a day, use portion control, use plate planning method and Aim for 3 carb servings at meals and 0-1 carb servings at snacks  Exercise / activity plan: walking as able  Check blood sugars before meals and bedtime  Keep a blood glucose record for next visit.  Keep a food record for the next visit.  AVS printed and provided to patient  today.    FOLLOW-UP:  Follow-up appointment scheduled in 2 weeks.  Follow-up with endocrinology next week.   Follow-up planned for BG review by phone/e-mail/MyChart as needed.  Chart routed to referring provider.    Ongoing plan for education and support: Written resources (magazines, books, etc.), Follow-up visit with diabetes educator  and Follow-up with primary care provider    Lucía Chávez RD, CDE  Diabetes     Time Spent: 60 minutes  Encounter Type: Individual    Any diabetes medication dose changes were made via the CDE Protocol and Collaborative Practice Agreement with the patient's endocrinology provider. A copy of this encounter was shared with the provider.

## 2017-06-15 NOTE — MR AVS SNAPSHOT
After Visit Summary   6/15/2017    Steve Morgan    MRN: [id number]           Patient Information     Date Of Birth          1960        Visit Information        Provider Department      6/15/2017 12:30 PM Lucía Chávez [facility]        Today's Diagnoses     Type 2 diabetes mellitus with diabetic neuropathy, unspecified long term insulin use status (H)    -  1       Follow-ups after your visit        Your next 10 appointments already scheduled     Jun 20, 2017  1:00 PM CDT   Return Visit with BRIDGET Payne CNP   [facility]    [address]  [facility] [id number] [phone]            Jun 28, 2017  9:30 AM CDT   Diabetic Education with Lucía Chávez   [facility] ([facility]    [address]  [facility] [id number] [phone]              Who to contact     If you have questions or need follow up information about today's clinic visit or your schedule please contact [facility] directly at [phone].  Normal or non-critical lab and imaging results will be communicated to you by [employer], letter or phone within 4 business days after the clinic has received the results. If you do not hear from us within 7 days, please contact the clinic through [employer] or phone. If you have a critical or abnormal lab result, we will notify you by phone as soon as possible.  Submit refill requests through [employer] or call your pharmacy and they will forward the refill request to us. Please allow 3 business days for your refill to be completed.          Additional Information About Your Visit        [employer] Information     [employer] gives you secure access to your electronic health record. If you see a primary care provider, you can also send messages to your care team and make appointments. If you have questions, please call your primary care clinic.  If  you do not have a primary care provider, please call [phone] and they will assist you.        Care EveryWhere ID     This is your Care EveryWhere ID. This could be used by other organizations to access your [facility] medical records  [id number]         Blood Pressure from Last 3 Encounters:   06/13/17 132/78   05/23/17 148/66   05/17/17 139/89    Weight from Last 3 Encounters:   06/13/17 109.4 kg (241 lb 3.2 oz)   05/23/17 110.7 kg (244 lb)   05/17/17 107.3 kg (236 lb 9.6 oz)              We Performed the Following     DIABETES EDUCATION - Individual  []        Primary Care Provider Office Phone # Fax #    Lisa Pierre PA-C [phone]       [address] [address]        Thank you!     Thank you for choosing [facility]  for your care. Our goal is always to provide you with excellent care. Hearing back from our patients is one way we can continue to improve our services. Please take a few minutes to complete the written survey that you may receive in the mail after your visit with us. Thank you!             Your Updated Medication List - Protect others around you: Learn how to safely use, store and throw away your medicines at www.disposemymeds.org.          This list is accurate as of: 6/15/17  4:39 PM.  Always use your most recent med list.                   Brand Name Dispense Instructions for use    ammonium lactate 12 % cream    LAC-HYDRIN    385 g    Apply topically 2 times daily as needed for dry skin       aspirin 81 MG tablet     100    ONE DAILY       atenolol 25 MG tablet    TENORMIN    90 tablet    Take 1 tablet (25 mg) by mouth every morning       blood glucose monitoring lancets     1 Box    Use to test blood sugars 2 times daily or as directed.       blood glucose monitoring meter device kit     1 kit    Use to test blood sugars 2 times daily or as directed.       blood glucose monitoring test strip    ONE  TOUCH VERIO IQ    100 strip    Use to test blood sugars 2 times daily or as directed.       buPROPion 300 MG 24 hr tablet    WELLBUTRIN XL    90 tablet    Take 1 tablet (300 mg) by mouth every morning       CALCIUM 600 + D PO      Take 1 tablet by mouth daily       cephALEXin 500 MG capsule    KEFLEX    30 capsule    Take 1 capsule (500 mg) by mouth 3 times daily       citalopram 20 MG tablet    celeXA    90 tablet    Take 1 tablet (20 mg) by mouth daily       ferrous sulfate 325 (65 FE) MG tablet    IRON     Take 1 tablet by mouth daily (with breakfast).       glipiZIDE 10 MG 24 hr tablet    GLUCOTROL XL    180 tablet    Take 2 tablets (20 mg) by mouth every morning       hydrocortisone 0.2 % cream    WESTCORT    45 g    Apply topically 2 times daily as needed Apply sparingly to affected area, BID.       insulin glargine 100 UNIT/ML injection    LANTUS SOLOSTAR    15 mL    Inject 10 units sq q hs       insulin pen needle 31G X 5 MM    B-D U/F    100 each    Use 1 daily or as directed.       levothyroxine 200 MCG tablet    SYNTHROID/LEVOTHROID    90 tablet    Take 1 tablet (200 mcg) by mouth daily       losartan 50 MG tablet    COZAAR    90 tablet    Take 1 tablet (50 mg) by mouth daily       metFORMIN 1000 MG tablet    GLUCOPHAGE    180 tablet    Take 1 tablet (1,000 mg) by mouth 2 times daily (with meals)       Multi-vitamin Tabs tablet   Generic drug:  multivitamin, therapeutic with minerals     30    1 TABLET DAILY       omeprazole-sodium bicarbonate  MG per capsule    ZEGERID     Take 1 capsule by mouth every morning (before breakfast)       * order for DME     1 Units    Equipment being ordered: single cane with rubber foot       * order for DME     1 Units    Equipment being ordered: four pronged cane       simvastatin 20 MG tablet    ZOCOR    90 tablet    Take 1 tablet (20 mg) by mouth At Bedtime       VITAMIN B 12 PO      Take 250 mcg by mouth daily       VITAMIN D (CHOLECALCIFEROL) PO      Take  1,000 Units by mouth daily.       * Notice:  This list has 2 medication(s) that are the same as other medications prescribed for you. Read the directions carefully, and ask your doctor or other care provider to review them with you.

## 2017-06-15 NOTE — Clinical Note
maurizio Brown- Patient re-sched missed appt from yesterday. Insulin teaching done along with review of healthy eating. Will plan to see him back in 2 weeks.  Thanks! Lucía Chávez RD, CDE Diabetes

## 2017-06-22 ENCOUNTER — OFFICE VISIT (OUTPATIENT)
Dept: FAMILY MEDICINE | Facility: CLINIC | Age: 57
End: 2017-06-22
Payer: COMMERCIAL

## 2017-06-22 VITALS
TEMPERATURE: 98.4 F | DIASTOLIC BLOOD PRESSURE: 87 MMHG | HEIGHT: 65 IN | BODY MASS INDEX: 40.48 KG/M2 | WEIGHT: 243 LBS | SYSTOLIC BLOOD PRESSURE: 130 MMHG | OXYGEN SATURATION: 97 % | HEART RATE: 84 BPM

## 2017-06-22 DIAGNOSIS — L02.219 CELLULITIS AND ABSCESS OF TRUNK: Primary | ICD-10-CM

## 2017-06-22 DIAGNOSIS — L03.319 CELLULITIS AND ABSCESS OF TRUNK: Primary | ICD-10-CM

## 2017-06-22 PROCEDURE — 87070 CULTURE OTHR SPECIMN AEROBIC: CPT | Performed by: PHYSICIAN ASSISTANT

## 2017-06-22 PROCEDURE — 87186 SC STD MICRODIL/AGAR DIL: CPT | Performed by: PHYSICIAN ASSISTANT

## 2017-06-22 PROCEDURE — 99213 OFFICE O/P EST LOW 20 MIN: CPT | Performed by: PHYSICIAN ASSISTANT

## 2017-06-22 PROCEDURE — 87077 CULTURE AEROBIC IDENTIFY: CPT | Performed by: PHYSICIAN ASSISTANT

## 2017-06-22 RX ORDER — CEFTRIAXONE 1 G/1
1000 INJECTION, POWDER, FOR SOLUTION INTRAMUSCULAR; INTRAVENOUS ONCE
Qty: 10 ML | Refills: 0 | OUTPATIENT
Start: 2017-06-22 | End: 2017-06-22

## 2017-06-22 RX ORDER — CLINDAMYCIN HCL 300 MG
300 CAPSULE ORAL 4 TIMES DAILY
Qty: 40 CAPSULE | Refills: 0 | Status: SHIPPED | OUTPATIENT
Start: 2017-06-22 | End: 2017-09-07

## 2017-06-22 NOTE — PROGRESS NOTES
HPI   SUBJECTIVE:                                                    Steve Morgan is a 57 year old male who presents to clinic today for the following health issues:    Concern - WOUND ON LOWER BELLY      Onset: FEW WEEKS     Description:   Wound on lower ABD area     Intensity: severe, 8/10    Progression of Symptoms:  same    Accompanying Signs & Symptoms:  Red area around the sore that is getting bigger.  painful        Previous history of similar problem:   none    Precipitating factors:   Worsened by: when patient is walking around or with any activity.  Wound rubs on underwear.     Alleviating factors:  Improved by: abx mild.       Therapies Tried and outcome: NOTHING.  Currently taking clindamycin 300 mg capsule 4 times daily.           Problem list and histories reviewed & adjusted, as indicated.  Additional history: the rash spread out slightly.    Patient Active Problem List   Diagnosis     Anemia     Obesity     Sleep apnea     Neuropathy in diabetes (H)     Hypothyroidism     HYPERLIPIDEMIA LDL GOAL <100     Hypertension goal BP (blood pressure) < 140/90     Peripheral arterial disease (H)     Tremor     Peripheral neuropathy (H)     Excessive anger     Generalized muscle weakness     Health Care Home     Unsteady gait     DAMIÁN (obstructive sleep apnea)     Vitamin B12 deficiency without anemia     Hyponatremia     Chronic hyponatremia     Type 2 diabetes mellitus with diabetic neuropathy (H)     Past Surgical History:   Procedure Laterality Date     COLONOSCOPY       GENITOURINARY SURGERY      surg for undescended testicle     REPAIR HAMMER TOE BILATERAL  5/16/2013    Procedure: REPAIR HAMMER TOE BILATERAL;  Flexor Tenotomy Toes 2,3,4,5 Bilateral Feet;  Surgeon: Saad Bangura DPM;  Location:  OR       Social History   Substance Use Topics     Smoking status: Never Smoker     Smokeless tobacco: Never Used     Alcohol use Yes      Comment: occ     Family History   Problem Relation Age of Onset      Breast Cancer Mother      Hypertension Mother      Thyroid Disease Mother      Depression Mother      Cancer - colorectal Father      Thyroid Disease Father      Depression Father      HEART DISEASE Maternal Grandmother      CHF     Circulatory Paternal Grandmother      CANCER Paternal Grandfather      DIABETES Brother      DIABETES Brother          Current Outpatient Prescriptions   Medication Sig Dispense Refill     clindamycin (CLEOCIN) 300 MG capsule Take 1 capsule (300 mg) by mouth 4 times daily 40 capsule 0     ammonium lactate (LAC-HYDRIN) 12 % cream Apply topically 2 times daily as needed for dry skin 385 g 3     insulin glargine (LANTUS SOLOSTAR) 100 UNIT/ML injection Inject 10 units sq q hs 15 mL 3     insulin pen needle (B-D U/F) 31G X 5 MM Use 1 daily or as directed. 100 each prn     buPROPion (WELLBUTRIN XL) 300 MG 24 hr tablet Take 1 tablet (300 mg) by mouth every morning 90 tablet 1     citalopram (CELEXA) 20 MG tablet Take 1 tablet (20 mg) by mouth daily 90 tablet 1     simvastatin (ZOCOR) 20 MG tablet Take 1 tablet (20 mg) by mouth At Bedtime 90 tablet 3     atenolol (TENORMIN) 25 MG tablet Take 1 tablet (25 mg) by mouth every morning 90 tablet 1     losartan (COZAAR) 50 MG tablet Take 1 tablet (50 mg) by mouth daily 90 tablet 0     levothyroxine (SYNTHROID/LEVOTHROID) 200 MCG tablet Take 1 tablet (200 mcg) by mouth daily 90 tablet 1     hydrocortisone (WESTCORT) 0.2 % cream Apply topically 2 times daily as needed Apply sparingly to affected area, BID. 45 g 1     glipiZIDE (GLUCOTROL XL) 10 MG 24 hr tablet Take 2 tablets (20 mg) by mouth every morning 180 tablet 1     metFORMIN (GLUCOPHAGE) 1000 MG tablet Take 1 tablet (1,000 mg) by mouth 2 times daily (with meals) 180 tablet 1     omeprazole-sodium bicarbonate (ZEGERID)  MG per capsule Take 1 capsule by mouth every morning (before breakfast)       blood glucose (ONE TOUCH VERIO IQ) test strip Use to test blood sugars 2 times daily or as  directed. 100 strip 0     blood glucose monitoring (ONE TOUCH DELICA) lancets Use to test blood sugars 2 times daily or as directed. 1 Box prn     Cyanocobalamin (VITAMIN B 12 PO) Take 250 mcg by mouth daily       Calcium Carb-Cholecalciferol (CALCIUM 600 + D PO) Take 1 tablet by mouth daily       ORDER FOR DME Equipment being ordered: four pronged cane 1 Units 0     ORDER FOR DME Equipment being ordered: single cane with rubber foot 1 Units 0     VITAMIN D, CHOLECALCIFEROL, PO Take 1,000 Units by mouth daily.       ferrous sulfate 325 (65 FE) MG tablet Take 1 tablet by mouth daily (with breakfast).       ASPIRIN 81 MG OR TABS ONE DAILY 100 3     MULTI-VITAMIN OR TABS 1 TABLET DAILY 30 0     No Known Allergies    Reviewed and updated as needed this visit by clinical staff  Tobacco  Allergies  Meds  Problems  Med Hx  Surg Hx  Fam Hx  Soc Hx        Reviewed and updated as needed this visit by Provider         ROS:      OBJECTIVE:     /64 (BP Location: Right arm, Patient Position: Chair, Cuff Size: Adult Regular)  Pulse 76  Temp 98.7  F (37.1  C) (Oral)  Resp 16  Wt 238 lb (108 kg)  BMI 39.61 kg/m2  Body mass index is 39.61 kg/(m^2).  Skin: large abscess in the lower abdomen with surrounding erythema.        ASSESSMENT/PLAN:             1. Abscess, abdomen (H)  . DRAIN SKIN ABSCESS SIMPLE/SINGLE      PLAN:  After informed consent was obtained, using Betadine for cleansing   and 1% Lidocaine  with epinephrine for anesthetic, with sterile   technique, an incision was made into the abscess cavity which was   then drained of purulent material.  The cavity was irrigated, and   packed with iodoform guaze.  Culture was already obtained. Procedure well   tolerated.  Dressing applied and wound care instructions provided.   May remove packing in 48 hours, and continue frequent warm tub soaks   until abscess resolves. Return to clinic prn for pain, increased   swelling or fever.    Jessica Fowler MD  [place]  [facility]      Physical Exam

## 2017-06-22 NOTE — PROGRESS NOTES
SUBJECTIVE:                                                    Steve Morgan is a 57 year old male who presents to clinic today for the following health issues:      Patient states he has a wound on his lower belly that has been there for a few weeks. Patient states that he was able to remove the head of the wound and there was light brown drainage. Patient states there is still drainage and pain. Patient said that the red area around the sore is getting bigger.     Denies fever/chills. No h/o MRSA.   Patient  With poorly controlled diabetes         Problem list and histories reviewed & adjusted, as indicated.  Additional history: as documented    Patient Active Problem List   Diagnosis     Anemia     Obesity     Sleep apnea     Neuropathy in diabetes (H)     Hypothyroidism     HYPERLIPIDEMIA LDL GOAL <100     Hypertension goal BP (blood pressure) < 140/90     Peripheral arterial disease (H)     Tremor     Peripheral neuropathy (H)     Excessive anger     Generalized muscle weakness     Health Care Home     Unsteady gait     DAMIÁN (obstructive sleep apnea)     Vitamin B12 deficiency without anemia     Hyponatremia     Chronic hyponatremia     Type 2 diabetes mellitus with diabetic neuropathy (H)     Past Surgical History:   Procedure Laterality Date     COLONOSCOPY       GENITOURINARY SURGERY      surg for undescended testicle     REPAIR HAMMER TOE BILATERAL  5/16/2013    Procedure: REPAIR HAMMER TOE BILATERAL;  Flexor Tenotomy Toes 2,3,4,5 Bilateral Feet;  Surgeon: Saad Bangura DPM;  Location:  OR       Social History   Substance Use Topics     Smoking status: Never Smoker     Smokeless tobacco: Never Used     Alcohol use Yes      Comment: occ     Family History   Problem Relation Age of Onset     Breast Cancer Mother      Hypertension Mother      Thyroid Disease Mother      Depression Mother      Cancer - colorectal Father      Thyroid Disease Father      Depression Father      HEART DISEASE Maternal  "Grandmother      CHF     Circulatory Paternal Grandmother      CANCER Paternal Grandfather      DIABETES Brother      DIABETES Brother            Reviewed and updated as needed this visit by clinical staff  Tobacco  Allergies  Med Hx  Surg Hx  Fam Hx  Soc Hx      Reviewed and updated as needed this visit by Provider         ROS:  Constitutional, HEENT, cardiovascular, pulmonary, gi and gu systems are negative, except as otherwise noted.    OBJECTIVE:                                                    /87 (BP Location: Right arm, Patient Position: Chair, Cuff Size: Adult Large)  Pulse 84  Temp 98.4  F (36.9  C) (Oral)  Ht 5' 5\" (1.651 m)  Wt 243 lb (110.2 kg)  SpO2 97%  BMI 40.44 kg/m2  Body mass index is 40.44 kg/(m^2).  GENERAL APPEARANCE: healthy, alert and no distress  RESP: lungs clear to auscultation - no rales, rhonchi or wheezes  LYMPHATICS: inguinal: no adenopathy  ABDOMEN: soft, nontender, without hepatosplenomegaly or masses and bowel sounds normal   (male): testicles normal without atrophy or masses and penis normal without urethral discharge  SKIN: open, draining lower abdominal abscess with small amount of superficial purulent discharge, cultured. 7-8 cm area of surrounding erythema, and warmth         ASSESSMENT/PLAN:                                                            1. Cellulitis and abscess of trunk  Rocephin given in clinic.   Start clindamycin today.   Recheck in 24-48 hours, sooner if symptoms worsen  Monitor sugars.   - cefTRIAXone (ROCEPHIN) 1 GM vial; Inject 1 g (1,000 mg) into the muscle once for 1 dose  Dispense: 10 mL; Refill: 0  - clindamycin (CLEOCIN) 300 MG capsule; Take 1 capsule (300 mg) by mouth 4 times daily  Dispense: 40 capsule; Refill: 0  - Wound Culture Aerobic Bacterial        Lisa Pierre PA-C, PA-C  [facility]"

## 2017-06-22 NOTE — NURSING NOTE
"Chief Complaint   Patient presents with     wound     stomach        Initial /87 (BP Location: Right arm, Patient Position: Chair, Cuff Size: Adult Large)  Pulse 84  Temp 98.4  F (36.9  C) (Oral)  Ht 5' 5\" (1.651 m)  Wt 243 lb (110.2 kg)  SpO2 97%  BMI 40.44 kg/m2 Estimated body mass index is 40.44 kg/(m^2) as calculated from the following:    Height as of this encounter: 5' 5\" (1.651 m).    Weight as of this encounter: 243 lb (110.2 kg).  Medication Reconciliation: complete AYDEE Maddox      "

## 2017-06-22 NOTE — MR AVS SNAPSHOT
After Visit Summary   6/22/2017    Steve Morgan    MRN: [id number]           Patient Information     Date Of Birth          1960        Visit Information        Provider Department      6/22/2017 3:45 PM Lisa Pierre PA-C [facility]        Today's Diagnoses     Cellulitis and abscess of trunk    -  1       Follow-ups after your visit        Your next 10 appointments already scheduled     Jun 24, 2017  8:15 AM CDT   SHORT with Jessica Fowler MD   [facility]    [address] [place] [id number] [phone]            Jun 28, 2017  9:30 AM CDT   Diabetic Education with Lucía Chávez   [facility]    [address] [place] [address] [phone]            Jun 29, 2017  8:00 AM CDT   Return Visit with BRIDGET Payne CNP   [facility] ([address] [address] [phone]              Who to contact     If you have questions or need follow up information about today's clinic visit or your schedule please contact [facility] directly at [phone].  Normal or non-critical lab and imaging results will be communicated to you by MyChart, letter or phone within 4 business days after the clinic has received the results. If you do not hear from us within 7 days, please contact the clinic through MyChart or phone. If you have a critical or abnormal lab result, we will notify you by phone as soon as possible.  Submit refill requests through [employer] or call your pharmacy and they will forward the refill request to us. Please allow 3 business days for your refill to be completed.          Additional Information About Your Visit        [employer] Information     [employer] gives you secure access to your electronic health record. If you see a primary  "care provider, you can also send messages to your care team and make appointments. If you have questions, please call your primary care clinic.  If you do not have a primary care provider, please call [phone] and they will assist you.        Care EveryWhere ID     This is your Care EveryWhere ID. This could be used by other organizations to access your [facility] medical records  [id number]        Your Vitals Were     Pulse Temperature Height Pulse Oximetry BMI (Body Mass Index)       84 98.4  F (36.9  C) (Oral) 5' 5\" (1.651 m) 97% 40.44 kg/m2        Blood Pressure from Last 3 Encounters:   06/22/17 130/87   06/13/17 132/78   05/23/17 148/66    Weight from Last 3 Encounters:   06/22/17 243 lb (110.2 kg)   06/13/17 241 lb 3.2 oz (109.4 kg)   05/23/17 244 lb (110.7 kg)              We Performed the Following     Wound Culture Aerobic Bacterial          Today's Medication Changes          These changes are accurate as of: 6/22/17  7:36 PM.  If you have any questions, ask your nurse or doctor.               Start taking these medicines.        Dose/Directions    cefTRIAXone 1 GM vial   Commonly known as:  ROCEPHIN   Used for:  Cellulitis and abscess of trunk   Started by:  Lisa Pierre PA-C        Dose:  1000 mg   Inject 1 g (1,000 mg) into the muscle once for 1 dose   Quantity:  10 mL   Refills:  0       clindamycin 300 MG capsule   Commonly known as:  CLEOCIN   Used for:  Cellulitis and abscess of trunk   Started by:  Lisa Pierre PA-C        Dose:  300 mg   Take 1 capsule (300 mg) by mouth 4 times daily   Quantity:  40 capsule   Refills:  0         Stop taking these medicines if you haven't already. Please contact your care team if you have questions.     blood glucose monitoring meter device kit   Stopped by:  Lisa Pierre PA-C                Where to get your medicines      These medications were sent to [facility]/pharmacy #[id number] [place], [place] - [address]  [id number] [facility] [address]     Phone:  [phone]     clindamycin 300 MG capsule         Some of these will need a paper prescription and others can be bought over the counter.  Ask your nurse if you have questions.     You don't need a prescription for these medications     cefTRIAXone 1 GM vial                Primary Care Provider Office Phone # Fax #    Lisa Sue Pierre PA-C [phone]       [facility] [address]  [facility] [address]        Equal Access to Services     KE ALMONTE : Hadii aad ku hadasho Soomaali, waaxda luqadaha, qaybta kaalmada adeegyada, waxay idiin haynikolasn dianna gimenez . So [facility] [phone].    ATENCIÓN: Si habla español, tiene a adler disposición servicios gratuitos de asistencia lingüística. [facility] [phone].    We comply with applicable federal civil rights laws and [place] laws. We do not discriminate on the basis of race, color, national origin, age, disability sex, sexual orientation or gender identity.            Thank you!     Thank you for choosing [facility]  for your care. Our goal is always to provide you with excellent care. Hearing back from our patients is one way we can continue to improve our services. Please take a few minutes to complete the written survey that you may receive in the mail after your visit with us. Thank you!             Your Updated Medication List - Protect others around you: Learn how to safely use, store and throw away your medicines at www.disposemymeds.org.          This list is accurate as of: 6/22/17  7:36 PM.  Always use your most recent med list.                   Brand Name Dispense Instructions for use Diagnosis    ammonium lactate 12 % cream    LAC-HYDRIN    385 g    Apply topically 2 times daily as needed for dry skin    Diabetic polyneuropathy associated with type 2 diabetes mellitus (H), Pre-ulcerative calluses, Pes planus of both feet       aspirin 81 MG tablet      100    ONE DAILY        atenolol 25 MG tablet    TENORMIN    90 tablet    Take 1 tablet (25 mg) by mouth every morning    Essential hypertension with goal blood pressure less than 140/90       blood glucose monitoring lancets     1 Box    Use to test blood sugars 2 times daily or as directed.    Type 2 diabetes, HbA1C goal < 8% (H)       blood glucose monitoring test strip    ONE TOUCH VERIO IQ    100 strip    Use to test blood sugars 2 times daily or as directed.    Type 2 diabetes, HbA1C goal < 8% (H)       buPROPion 300 MG 24 hr tablet    WELLBUTRIN XL    90 tablet    Take 1 tablet (300 mg) by mouth every morning    Excessive anger       CALCIUM 600 + D PO      Take 1 tablet by mouth daily        cefTRIAXone 1 GM vial    ROCEPHIN    10 mL    Inject 1 g (1,000 mg) into the muscle once for 1 dose    Cellulitis and abscess of trunk       cephALEXin 500 MG capsule    KEFLEX    30 capsule    Take 1 capsule (500 mg) by mouth 3 times daily    Fissure in skin       citalopram 20 MG tablet    celeXA    90 tablet    Take 1 tablet (20 mg) by mouth daily    Excessive anger       clindamycin 300 MG capsule    CLEOCIN    40 capsule    Take 1 capsule (300 mg) by mouth 4 times daily    Cellulitis and abscess of trunk       ferrous sulfate 325 (65 FE) MG tablet    IRON     Take 1 tablet by mouth daily (with breakfast).        glipiZIDE 10 MG 24 hr tablet    GLUCOTROL XL    180 tablet    Take 2 tablets (20 mg) by mouth every morning    Type 2 diabetes mellitus with diabetic neuropathy, without long-term current use of insulin (H)       hydrocortisone 0.2 % cream    WESTCORT    45 g    Apply topically 2 times daily as needed Apply sparingly to affected area, BID.    Dermatitis       insulin glargine 100 UNIT/ML injection    LANTUS SOLOSTAR    15 mL    Inject 10 units sq q hs    Type 2 diabetes mellitus with diabetic neuropathy, unspecified long term insulin use status (H)       insulin pen needle 31G X 5 MM    B-D U/F    100 each     Use 1 daily or as directed.    Type 2 diabetes mellitus with diabetic neuropathy, unspecified long term insulin use status (H)       levothyroxine 200 MCG tablet    SYNTHROID/LEVOTHROID    90 tablet    Take 1 tablet (200 mcg) by mouth daily    Hypothyroidism, unspecified type       losartan 50 MG tablet    COZAAR    90 tablet    Take 1 tablet (50 mg) by mouth daily    Essential hypertension with goal blood pressure less than 140/90       metFORMIN 1000 MG tablet    GLUCOPHAGE    180 tablet    Take 1 tablet (1,000 mg) by mouth 2 times daily (with meals)    Type 2 diabetes mellitus with diabetic neuropathy, without long-term current use of insulin (H)       Multi-vitamin Tabs tablet   Generic drug:  multivitamin, therapeutic with minerals     30    1 TABLET DAILY        omeprazole-sodium bicarbonate  MG per capsule    ZEGERID     Take 1 capsule by mouth every morning (before breakfast)        * order for DME     1 Units    Equipment being ordered: single cane with rubber foot    Type 2 diabetes, HbA1C goal < 8% (H), Neuropathy in diabetes (H), Unsteady gait       * order for DME     1 Units    Equipment being ordered: four pronged cane    Unsteady gait, Neuropathy in diabetes (H), Type 2 diabetes, HbA1C goal < 8% (H)       simvastatin 20 MG tablet    ZOCOR    90 tablet    Take 1 tablet (20 mg) by mouth At Bedtime    Hyperlipidemia LDL goal <100       VITAMIN B 12 PO      Take 250 mcg by mouth daily    Excessive anger       VITAMIN D (CHOLECALCIFEROL) PO      Take 1,000 Units by mouth daily.        * Notice:  This list has 2 medication(s) that are the same as other medications prescribed for you. Read the directions carefully, and ask your doctor or other care provider to review them with you.

## 2017-06-23 ENCOUNTER — EXTERNAL ORDER RESULTS (OUTPATIENT)
Dept: HEALTH INFORMATION MANAGEMENT | Facility: CLINIC | Age: 57
End: 2017-06-23

## 2017-06-24 ENCOUNTER — OFFICE VISIT (OUTPATIENT)
Dept: FAMILY MEDICINE | Facility: CLINIC | Age: 57
End: 2017-06-24
Payer: COMMERCIAL

## 2017-06-24 VITALS
TEMPERATURE: 98.7 F | WEIGHT: 238 LBS | HEART RATE: 76 BPM | SYSTOLIC BLOOD PRESSURE: 114 MMHG | BODY MASS INDEX: 39.61 KG/M2 | DIASTOLIC BLOOD PRESSURE: 64 MMHG | RESPIRATION RATE: 16 BRPM

## 2017-06-24 PROCEDURE — 10060 I&D ABSCESS SIMPLE/SINGLE: CPT | Performed by: FAMILY MEDICINE

## 2017-06-24 ASSESSMENT — PAIN SCALES - GENERAL: PAINLEVEL: EXTREME PAIN (8)

## 2017-06-24 NOTE — NURSING NOTE
"Chief Complaint   Patient presents with     Wound Check      wound on his lower belly that has been there for a few weeks     Initial /64 (BP Location: Right arm, Patient Position: Chair, Cuff Size: Adult Regular)  Pulse 76  Temp 98.7  F (37.1  C) (Oral)  Resp 16  Wt 238 lb (108 kg)  BMI 39.61 kg/m2 Estimated body mass index is 39.61 kg/(m^2) as calculated from the following:    Height as of 6/22/17: 5' 5\" (1.651 m).    Weight as of this encounter: 238 lb (108 kg).  BP completed using cuff size regular right arm.    Lisa Magill, CMA    "

## 2017-06-24 NOTE — MR AVS SNAPSHOT
After Visit Summary   6/24/2017    Steve Morgan    MRN: [id number]           Patient Information     Date Of Birth          1960        Visit Information        Provider Department      6/24/2017 8:15 AM Jessica Fowler MD [facility]        Today's Diagnoses     Abscess, abdomen (H)    -  1       Follow-ups after your visit        Your next 10 appointments already scheduled     Jun 26, 2017  7:45 AM CDT   SHORT with Jessica Fowler MD   [facility] ([address] [address]   [phone]            Jun 28, 2017  9:30 AM CDT   Diabetic Education with Lucía Chávez   [facility]    [address] [place] [address] [phone]            Jun 29, 2017  8:00 AM CDT   Return Visit with BRIDGET Payne CNP   [facility] ([address] [address] [phone]              Who to contact     If you have questions or need follow up information about today's clinic visit or your schedule please contact [facility] directly at [phone].  Normal or non-critical lab and imaging results will be communicated to you by [employer], letter or phone within 4 business days after the clinic has received the results. If you do not hear from us within 7 days, please contact the clinic through MyChart or phone. If you have a critical or abnormal lab result, we will notify you by phone as soon as possible.  Submit refill requests through [employer] or call your pharmacy and they will forward the refill request to us. Please allow 3 business days for your refill to be completed.          Additional Information About Your Visit        [employer] Information     [employer] gives you secure access to your electronic health record. If you see a primary care provider, you can also  send messages to your care team and make appointments. If you have questions, please call your primary care clinic.  If you do not have a primary care provider, please call [phone] and they will assist you.        Care EveryWhere ID     This is your Care EveryWhere ID. This could be used by other organizations to access your [facility] medical records  [id number]        Your Vitals Were     Pulse Temperature Respirations BMI (Body Mass Index)          76 98.7  F (37.1  C) (Oral) 16 39.61 kg/m2         Blood Pressure from Last 3 Encounters:   06/24/17 114/64   06/22/17 130/87   06/13/17 132/78    Weight from Last 3 Encounters:   06/24/17 238 lb (108 kg)   06/22/17 243 lb (110.2 kg)   06/13/17 241 lb 3.2 oz (109.4 kg)              We Performed the Following     DRAIN SKIN ABSCESS SIMPLE/SINGLE        Primary Care Provider Office Phone # Fax #    Lisa Sue Pierre PA-C [phone]       [place] [address] [address] [facility] [address]        Equal Access to Services     SHELLEY ALMONTE : Hadii aad ku sunithao Solisandra, waaxda luqadaha, qaybta kaalmada sarika, jayson gimenez . So [facility] [phone].    ATENCIÓN: Si habla español, tiene a adler disposición servicios gratuitos de asistencia lingüística. [facility] [phone].    We comply with applicable federal civil rights laws and [place] laws. We do not discriminate on the basis of race, color, national origin, age, disability sex, sexual orientation or gender identity.            Thank you!     Thank you for choosing [facility]  for your care. Our goal is always to provide you with excellent care. Hearing back from our patients is one way we can continue to improve our services. Please take a few minutes to complete the written survey that you may receive in the mail after your visit with us. Thank you!             Your Updated Medication List - Protect others around you: Learn how to  safely use, store and throw away your medicines at www.disposemymeds.org.          This list is accurate as of: 6/24/17 10:52 AM.  Always use your most recent med list.                   Brand Name Dispense Instructions for use Diagnosis    ammonium lactate 12 % cream    LAC-HYDRIN    385 g    Apply topically 2 times daily as needed for dry skin    Diabetic polyneuropathy associated with type 2 diabetes mellitus (H), Pre-ulcerative calluses, Pes planus of both feet       aspirin 81 MG tablet     100    ONE DAILY        atenolol 25 MG tablet    TENORMIN    90 tablet    Take 1 tablet (25 mg) by mouth every morning    Essential hypertension with goal blood pressure less than 140/90       blood glucose monitoring lancets     1 Box    Use to test blood sugars 2 times daily or as directed.    Type 2 diabetes, HbA1C goal < 8% (H)       blood glucose monitoring test strip    ONE TOUCH VERIO IQ    100 strip    Use to test blood sugars 2 times daily or as directed.    Type 2 diabetes, HbA1C goal < 8% (H)       buPROPion 300 MG 24 hr tablet    WELLBUTRIN XL    90 tablet    Take 1 tablet (300 mg) by mouth every morning    Excessive anger       CALCIUM 600 + D PO      Take 1 tablet by mouth daily        citalopram 20 MG tablet    celeXA    90 tablet    Take 1 tablet (20 mg) by mouth daily    Excessive anger       clindamycin 300 MG capsule    CLEOCIN    40 capsule    Take 1 capsule (300 mg) by mouth 4 times daily    Cellulitis and abscess of trunk       ferrous sulfate 325 (65 FE) MG tablet    IRON     Take 1 tablet by mouth daily (with breakfast).        glipiZIDE 10 MG 24 hr tablet    GLUCOTROL XL    180 tablet    Take 2 tablets (20 mg) by mouth every morning    Type 2 diabetes mellitus with diabetic neuropathy, without long-term current use of insulin (H)       hydrocortisone 0.2 % cream    WESTCORT    45 g    Apply topically 2 times daily as needed Apply sparingly to affected area, BID.    Dermatitis       insulin glargine  100 UNIT/ML injection    LANTUS SOLOSTAR    15 mL    Inject 10 units sq q hs    Type 2 diabetes mellitus with diabetic neuropathy, unspecified long term insulin use status (H)       insulin pen needle 31G X 5 MM    B-D U/F    100 each    Use 1 daily or as directed.    Type 2 diabetes mellitus with diabetic neuropathy, unspecified long term insulin use status (H)       levothyroxine 200 MCG tablet    SYNTHROID/LEVOTHROID    90 tablet    Take 1 tablet (200 mcg) by mouth daily    Hypothyroidism, unspecified type       losartan 50 MG tablet    COZAAR    90 tablet    Take 1 tablet (50 mg) by mouth daily    Essential hypertension with goal blood pressure less than 140/90       metFORMIN 1000 MG tablet    GLUCOPHAGE    180 tablet    Take 1 tablet (1,000 mg) by mouth 2 times daily (with meals)    Type 2 diabetes mellitus with diabetic neuropathy, without long-term current use of insulin (H)       Multi-vitamin Tabs tablet   Generic drug:  multivitamin, therapeutic with minerals     30    1 TABLET DAILY        omeprazole-sodium bicarbonate  MG per capsule    ZEGERID     Take 1 capsule by mouth every morning (before breakfast)        * order for DME     1 Units    Equipment being ordered: single cane with rubber foot    Type 2 diabetes, HbA1C goal < 8% (H), Neuropathy in diabetes (H), Unsteady gait       * order for DME     1 Units    Equipment being ordered: four pronged cane    Unsteady gait, Neuropathy in diabetes (H), Type 2 diabetes, HbA1C goal < 8% (H)       simvastatin 20 MG tablet    ZOCOR    90 tablet    Take 1 tablet (20 mg) by mouth At Bedtime    Hyperlipidemia LDL goal <100       VITAMIN B 12 PO      Take 250 mcg by mouth daily    Excessive anger       VITAMIN D (CHOLECALCIFEROL) PO      Take 1,000 Units by mouth daily.        * Notice:  This list has 2 medication(s) that are the same as other medications prescribed for you. Read the directions carefully, and ask your doctor or other care provider to review  them with you.

## 2017-06-25 LAB
BACTERIA SPEC CULT: ABNORMAL
Lab: ABNORMAL
MICRO REPORT STATUS: ABNORMAL
MICROORGANISM SPEC CULT: ABNORMAL
SPECIMEN SOURCE: ABNORMAL

## 2017-06-26 ENCOUNTER — OFFICE VISIT (OUTPATIENT)
Dept: FAMILY MEDICINE | Facility: CLINIC | Age: 57
End: 2017-06-26
Payer: COMMERCIAL

## 2017-06-26 VITALS
HEART RATE: 69 BPM | TEMPERATURE: 98 F | DIASTOLIC BLOOD PRESSURE: 79 MMHG | BODY MASS INDEX: 39.82 KG/M2 | RESPIRATION RATE: 16 BRPM | HEIGHT: 65 IN | SYSTOLIC BLOOD PRESSURE: 124 MMHG | WEIGHT: 239 LBS | OXYGEN SATURATION: 96 %

## 2017-06-26 DIAGNOSIS — I73.9 PERIPHERAL ARTERIAL DISEASE (H): ICD-10-CM

## 2017-06-26 DIAGNOSIS — E66.01 MORBID OBESITY DUE TO EXCESS CALORIES (H): ICD-10-CM

## 2017-06-26 DIAGNOSIS — Z51.89 WOUND CHECK, ABSCESS: Primary | ICD-10-CM

## 2017-06-26 DIAGNOSIS — F32.A DEPRESSION: ICD-10-CM

## 2017-06-26 PROCEDURE — 99024 POSTOP FOLLOW-UP VISIT: CPT | Performed by: FAMILY MEDICINE

## 2017-06-26 ASSESSMENT — ANXIETY QUESTIONNAIRES
7. FEELING AFRAID AS IF SOMETHING AWFUL MIGHT HAPPEN: NOT AT ALL
2. NOT BEING ABLE TO STOP OR CONTROL WORRYING: NOT AT ALL
5. BEING SO RESTLESS THAT IT IS HARD TO SIT STILL: NOT AT ALL
3. WORRYING TOO MUCH ABOUT DIFFERENT THINGS: NOT AT ALL
1. FEELING NERVOUS, ANXIOUS, OR ON EDGE: NOT AT ALL
6. BECOMING EASILY ANNOYED OR IRRITABLE: MORE THAN HALF THE DAYS

## 2017-06-26 NOTE — NURSING NOTE
"Chief Complaint   Patient presents with     Wound Check     lower abdomen       Initial /79 (BP Location: Left arm, Patient Position: Chair, Cuff Size: Adult Large)  Pulse 69  Temp 98  F (36.7  C) (Oral)  Resp 16  Ht 5' 5\" (1.651 m)  Wt 239 lb (108.4 kg)  SpO2 96%  BMI 39.77 kg/m2 Estimated body mass index is 39.77 kg/(m^2) as calculated from the following:    Height as of this encounter: 5' 5\" (1.651 m).    Weight as of this encounter: 239 lb (108.4 kg).  Medication Reconciliation: complete   Margaret Walters, CESAR      "

## 2017-06-26 NOTE — MR AVS SNAPSHOT
After Visit Summary   6/26/2017    Steve Morgan    MRN: [id number]           Patient Information     Date Of Birth          1960        Visit Information        Provider Department      6/26/2017 7:45 AM Jessica Fowler MD [facility]        Today's Diagnoses     Wound check, abscess    -  1    Peripheral arterial disease (H)        Morbid obesity due to excess calories (H)           Follow-ups after your visit        Your next 10 appointments already scheduled     Jun 27, 2017  9:30 AM CDT   SHORT with Jessica Fowler MD   [address] [id number] [phone]            Jun 28, 2017  9:30 AM CDT   Diabetic Education with Lucía Chávez   [facility] ([address] [id number] [phone]            Jun 29, 2017  8:00 AM CDT   Return Visit with BRIDGET Payne CNP   [facility] ([address] [id number] [phone]              Who to contact     If you have questions or need follow up information about today's clinic visit or your schedule please contact [facility] directly at [phone].  Normal or non-critical lab and imaging results will be communicated to you by MyChart, letter or phone within 4 business days after the clinic has received the results. If you do not hear from us within 7 days, please contact the clinic through MyChart or phone. If you have a critical or abnormal lab result, we will notify you by phone as soon as possible.  Submit refill requests through [employer] or call your pharmacy and they will forward the refill request to us. Please allow 3 business days for your refill to be completed.          Additional Information About Your Visit        StepLeaderharDatical Information     [employer] gives you secure  "access to your electronic health record. If you see a primary care provider, you can also send messages to your care team and make appointments. If you have questions, please call your primary care clinic.  If you do not have a primary care provider, please call [phone] and they will assist you.        Care EveryWhere ID     This is your Care EveryWhere ID. This could be used by other organizations to access your [place] medical records  [id number]        Your Vitals Were     Pulse Temperature Respirations Height Pulse Oximetry BMI (Body Mass Index)    69 98  F (36.7  C) (Oral) 16 5' 5\" (1.651 m) 96% 39.77 kg/m2       Blood Pressure from Last 3 Encounters:   06/26/17 124/79   06/24/17 114/64   06/22/17 130/87    Weight from Last 3 Encounters:   06/26/17 239 lb (108.4 kg)   06/24/17 238 lb (108 kg)   06/22/17 243 lb (110.2 kg)              Today, you had the following     No orders found for display       Primary Care Provider Office Phone # Fax #    Lisa Sue Pierre PA-C [phone]       [address] [address]        Equal Access to Services     KE [place]MON[place] : Hadii ana ku hadasho Soomaali, waaxda luqadaha, qaybta kaalmada adeegyada, waxay trayin haynikolasn dianna gimenez ah. So [facility] [phone].    ATENCIÓN: Si habla español, tiene a adler disposición servicios gratuitos de asistencia lingüística. Llame al [phone].    We comply with applicable federal civil rights laws and [place] laws. We do not discriminate on the basis of race, color, national origin, age, disability sex, sexual orientation or gender identity.            Thank you!     Thank you for choosing [facility]  for your care. Our goal is always to provide you with excellent care. Hearing back from our patients is one way we can continue to improve our services. Please take a few minutes to complete the written survey that you may receive in the mail after your " visit with us. Thank you!             Your Updated Medication List - Protect others around you: Learn how to safely use, store and throw away your medicines at www.disposemymeds.org.          This list is accurate as of: 6/26/17 11:45 AM.  Always use your most recent med list.                   Brand Name Dispense Instructions for use Diagnosis    ammonium lactate 12 % cream    LAC-HYDRIN    385 g    Apply topically 2 times daily as needed for dry skin    Diabetic polyneuropathy associated with type 2 diabetes mellitus (H), Pre-ulcerative calluses, Pes planus of both feet       aspirin 81 MG tablet     100    ONE DAILY        atenolol 25 MG tablet    TENORMIN    90 tablet    Take 1 tablet (25 mg) by mouth every morning    Essential hypertension with goal blood pressure less than 140/90       blood glucose monitoring lancets     1 Box    Use to test blood sugars 2 times daily or as directed.    Type 2 diabetes, HbA1C goal < 8% (H)       blood glucose monitoring test strip    ONE TOUCH VERIO IQ    100 strip    Use to test blood sugars 2 times daily or as directed.    Type 2 diabetes, HbA1C goal < 8% (H)       buPROPion 300 MG 24 hr tablet    WELLBUTRIN XL    90 tablet    Take 1 tablet (300 mg) by mouth every morning    Excessive anger       CALCIUM 600 + D PO      Take 1 tablet by mouth daily        citalopram 20 MG tablet    celeXA    90 tablet    Take 1 tablet (20 mg) by mouth daily    Excessive anger       clindamycin 300 MG capsule    CLEOCIN    40 capsule    Take 1 capsule (300 mg) by mouth 4 times daily    Cellulitis and abscess of trunk       ferrous sulfate 325 (65 FE) MG tablet    IRON     Take 1 tablet by mouth daily (with breakfast).        glipiZIDE 10 MG 24 hr tablet    GLUCOTROL XL    180 tablet    Take 2 tablets (20 mg) by mouth every morning    Type 2 diabetes mellitus with diabetic neuropathy, without long-term current use of insulin (H)       hydrocortisone 0.2 % cream    WESTCORT    45 g    Apply  topically 2 times daily as needed Apply sparingly to affected area, BID.    Dermatitis       insulin glargine 100 UNIT/ML injection    LANTUS SOLOSTAR    15 mL    Inject 10 units sq q hs    Type 2 diabetes mellitus with diabetic neuropathy, unspecified long term insulin use status (H)       insulin pen needle 31G X 5 MM    B-D U/F    100 each    Use 1 daily or as directed.    Type 2 diabetes mellitus with diabetic neuropathy, unspecified long term insulin use status (H)       levothyroxine 200 MCG tablet    SYNTHROID/LEVOTHROID    90 tablet    Take 1 tablet (200 mcg) by mouth daily    Hypothyroidism, unspecified type       losartan 50 MG tablet    COZAAR    90 tablet    Take 1 tablet (50 mg) by mouth daily    Essential hypertension with goal blood pressure less than 140/90       metFORMIN 1000 MG tablet    GLUCOPHAGE    180 tablet    Take 1 tablet (1,000 mg) by mouth 2 times daily (with meals)    Type 2 diabetes mellitus with diabetic neuropathy, without long-term current use of insulin (H)       Multi-vitamin Tabs tablet   Generic drug:  multivitamin, therapeutic with minerals     30    1 TABLET DAILY        omeprazole-sodium bicarbonate  MG per capsule    ZEGERID     Take 1 capsule by mouth every morning (before breakfast)        * order for DME     1 Units    Equipment being ordered: single cane with rubber foot    Type 2 diabetes, HbA1C goal < 8% (H), Neuropathy in diabetes (H), Unsteady gait       * order for DME     1 Units    Equipment being ordered: four pronged cane    Unsteady gait, Neuropathy in diabetes (H), Type 2 diabetes, HbA1C goal < 8% (H)       simvastatin 20 MG tablet    ZOCOR    90 tablet    Take 1 tablet (20 mg) by mouth At Bedtime    Hyperlipidemia LDL goal <100       VITAMIN B 12 PO      Take 250 mcg by mouth daily    Excessive anger       VITAMIN D (CHOLECALCIFEROL) PO      Take 1,000 Units by mouth daily.        * Notice:  This list has 2 medication(s) that are the same as other  medications prescribed for you. Read the directions carefully, and ask your doctor or other care provider to review them with you.

## 2017-06-26 NOTE — LETTER
My Depression Action Plan  Name: Steve Morgan   Date of Birth 1960  Date: 6/26/2017    My doctor: Lisa Pierre   My clinic: [address] [address] [phone]          GREEN    ZONE   Good Control    What it looks like:     Things are going generally well. You have normal up s and down s. You may even feel depressed from time to time, but bad moods usually last less than a day.   What you need to do:  1. Continue to care for yourself (see self care plan)  2. Check your depression survival kit and update it as needed  3. Follow your physician s recommendations including any medication.  4. Do not stop taking medication unless you consult with your physician first.           YELLOW         ZONE Getting Worse    What it looks like:     Depression is starting to interfere with your life.     It may be hard to get out of bed; you may be starting to isolate yourself from others.    Symptoms of depression are starting to last most all day and this has happened for several days.     You may have suicidal thoughts but they are not constant.   What you need to do:     1. Call your care team, your response to treatment will improve if you keep your care team informed of your progress. Yellow periods are signs an adjustment may need to be made.     2. Continue your self-care, even if you have to fake it!    3. Talk to someone in your support network    4. Open up your depression survival kit           RED    ZONE Medical Alert - Get Help    What it looks like:     Depression is seriously interfering with your life.     You may experience these or other symptoms: You can t get out of bed most days, can t work or engage in other necessary activities, you have trouble taking care of basic hygiene, or basic responsibilities, thoughts of suicide or death that will not go away, self-injurious behavior.     What you need to do:  1. Call your  care team and request a same-day appointment. If they are not available (weekends or after hours) call your local crisis line, emergency room or 911.      Electronically signed by: Margaret Walters, June 26, 2017    Depression Self Care Plan / Survival Kit    Self-Care for Depression  Here s the deal. Your body and mind are really not as separate as most people think.  What you do and think affects how you feel and how you feel influences what you do and think. This means if you do things that people who feel good do, it will help you feel better.  Sometimes this is all it takes.  There is also a place for medication and therapy depending on how severe your depression is, so be sure to consult with your medical provider and/ or Behavioral Health Consultant if your symptoms are worsening or not improving.     In order to better manage my stress, I will:    Exercise  Get some form of exercise, every day. This will help reduce pain and release endorphins, the  feel good  chemicals in your brain. This is almost as good as taking antidepressants!  This is not the same as joining a gym and then never going! (they count on that by the way ) It can be as simple as just going for a walk or doing some gardening, anything that will get you moving.      Hygiene   Maintain good hygiene (Get out of bed in the morning, Make your bed, Brush your teeth, Take a shower, and Get dressed like you were going to work, even if you are unemployed).  If your clothes don't fit try to get ones that do.    Diet  I will strive to eat foods that are good for me, drink plenty of water, and avoid excessive sugar, caffeine, alcohol, and other mood-altering substances.  Some foods that are helpful in depression are: complex carbohydrates, B vitamins, flaxseed, fish or fish oil, fresh fruits and vegetables.    Psychotherapy  I agree to participate in Individual Therapy (if recommended).    Medication  If prescribed medications, I agree to take them.   Missing doses can result in serious side effects.  I understand that drinking alcohol, or other illicit drug use, may cause potential side effects.  I will not stop my medication abruptly without first discussing it with my provider.    Staying Connected With Others  I will stay in touch with my friends, family members, and my primary care provider/team.    Use your imagination  Be creative.  We all have a creative side; it doesn t matter if it s oil painting, sand castles, or mud pies! This will also kick up the endorphins.    Witness Beauty  (AKA stop and smell the roses) Take a look outside, even in mid-winter. Notice colors, textures. Watch the squirrels and birds.     Service to others  Be of service to others.  There is always someone else in need.  By helping others we can  get out of ourselves  and remember the really important things.  This also provides opportunities for practicing all the other parts of the program.    Humor  Laugh and be silly!  Adjust your TV habits for less news and crime-drama and more comedy.    Control your stress  Try breathing deep, massage therapy, biofeedback, and meditation. Find time to relax each day.     My support system    Clinic Contact:  Phone number:    Contact 1:  Phone number:    Contact 2:  Phone number:    Faith/:  Phone number:    Therapist:  Phone number:    Local crisis center:    Phone number:    Other community support:  Phone number:

## 2017-06-26 NOTE — PROGRESS NOTES
SUBJECTIVE:                                                    Steve Morgan is a 57 year old male who presents to clinic today for the following health issues:      Wound check - lower abdomen    The swelling went down, but he felt more pain this morning.       Problem list and histories reviewed & adjusted, as indicated.  Additional history: as documented    Patient Active Problem List   Diagnosis     Anemia     Morbid obesity due to excess calories (H)     Sleep apnea     Neuropathy in diabetes (H)     Hypothyroidism     HYPERLIPIDEMIA LDL GOAL <100     Hypertension goal BP (blood pressure) < 140/90     Peripheral arterial disease (H)     Tremor     Peripheral neuropathy (H)     Excessive anger     Generalized muscle weakness     Health Care Home     Unsteady gait     DAMIÁN (obstructive sleep apnea)     Vitamin B12 deficiency without anemia     Hyponatremia     Chronic hyponatremia     Type 2 diabetes mellitus with diabetic neuropathy (H)     Past Surgical History:   Procedure Laterality Date     COLONOSCOPY       GENITOURINARY SURGERY      surg for undescended testicle     REPAIR HAMMER TOE BILATERAL  5/16/2013    Procedure: REPAIR HAMMER TOE BILATERAL;  Flexor Tenotomy Toes 2,3,4,5 Bilateral Feet;  Surgeon: Saad Bangura DPM;  Location:  OR       Social History   Substance Use Topics     Smoking status: Never Smoker     Smokeless tobacco: Never Used     Alcohol use Yes      Comment: occ     Family History   Problem Relation Age of Onset     Breast Cancer Mother      Hypertension Mother      Thyroid Disease Mother      Depression Mother      Cancer - colorectal Father      Thyroid Disease Father      Depression Father      HEART DISEASE Maternal Grandmother      CHF     Circulatory Paternal Grandmother      CANCER Paternal Grandfather      DIABETES Brother      DIABETES Brother          Current Outpatient Prescriptions   Medication Sig Dispense Refill     clindamycin (CLEOCIN) 300 MG capsule Take 1  capsule (300 mg) by mouth 4 times daily 40 capsule 0     ammonium lactate (LAC-HYDRIN) 12 % cream Apply topically 2 times daily as needed for dry skin 385 g 3     insulin glargine (LANTUS SOLOSTAR) 100 UNIT/ML injection Inject 10 units sq q hs 15 mL 3     insulin pen needle (B-D U/F) 31G X 5 MM Use 1 daily or as directed. 100 each prn     buPROPion (WELLBUTRIN XL) 300 MG 24 hr tablet Take 1 tablet (300 mg) by mouth every morning 90 tablet 1     citalopram (CELEXA) 20 MG tablet Take 1 tablet (20 mg) by mouth daily 90 tablet 1     simvastatin (ZOCOR) 20 MG tablet Take 1 tablet (20 mg) by mouth At Bedtime 90 tablet 3     atenolol (TENORMIN) 25 MG tablet Take 1 tablet (25 mg) by mouth every morning 90 tablet 1     losartan (COZAAR) 50 MG tablet Take 1 tablet (50 mg) by mouth daily 90 tablet 0     levothyroxine (SYNTHROID/LEVOTHROID) 200 MCG tablet Take 1 tablet (200 mcg) by mouth daily 90 tablet 1     hydrocortisone (WESTCORT) 0.2 % cream Apply topically 2 times daily as needed Apply sparingly to affected area, BID. 45 g 1     glipiZIDE (GLUCOTROL XL) 10 MG 24 hr tablet Take 2 tablets (20 mg) by mouth every morning 180 tablet 1     metFORMIN (GLUCOPHAGE) 1000 MG tablet Take 1 tablet (1,000 mg) by mouth 2 times daily (with meals) 180 tablet 1     omeprazole-sodium bicarbonate (ZEGERID)  MG per capsule Take 1 capsule by mouth every morning (before breakfast)       blood glucose (ONE TOUCH VERIO IQ) test strip Use to test blood sugars 2 times daily or as directed. 100 strip 0     blood glucose monitoring (ONE TOUCH DELICA) lancets Use to test blood sugars 2 times daily or as directed. 1 Box prn     Cyanocobalamin (VITAMIN B 12 PO) Take 250 mcg by mouth daily       Calcium Carb-Cholecalciferol (CALCIUM 600 + D PO) Take 1 tablet by mouth daily       ORDER FOR DME Equipment being ordered: four pronged cane 1 Units 0     ORDER FOR DME Equipment being ordered: single cane with rubber foot 1 Units 0     VITAMIN D,  "CHOLECALCIFEROL, PO Take 1,000 Units by mouth daily.       ferrous sulfate 325 (65 FE) MG tablet Take 1 tablet by mouth daily (with breakfast).       ASPIRIN 81 MG OR TABS ONE DAILY 100 3     MULTI-VITAMIN OR TABS 1 TABLET DAILY 30 0     No Known Allergies    Reviewed and updated as needed this visit by clinical staff  Tobacco  Allergies  Fam Hx  Soc Hx      Reviewed and updated as needed this visit by Provider         ROS:  C: NEGATIVE for fever, chills, change in weight    OBJECTIVE:     /79 (BP Location: Left arm, Patient Position: Chair, Cuff Size: Adult Large)  Pulse 69  Temp 98  F (36.7  C) (Oral)  Resp 16  Ht 5' 5\" (1.651 m)  Wt 239 lb (108.4 kg)  SpO2 96%  BMI 39.77 kg/m2  Body mass index is 39.77 kg/(m^2).  GENERAL: healthy, alert and no distress  SKIN: drained abscess in the lower abdomen with surrounding pink skin about 10 cm in diameter.         ASSESSMENT/PLAN:             1. Wound check, abscess  Area was cleaned and injected with lidocaine again today, and another packing was put into the abscess area.     2. Peripheral arterial disease (H)      3. Morbid obesity due to excess calories (H)        Follow up in 24 hours.    Jessica Fowler MD  [facility]    "

## 2017-06-27 ENCOUNTER — OFFICE VISIT (OUTPATIENT)
Dept: FAMILY MEDICINE | Facility: CLINIC | Age: 57
End: 2017-06-27
Payer: COMMERCIAL

## 2017-06-27 VITALS
BODY MASS INDEX: 39.32 KG/M2 | RESPIRATION RATE: 16 BRPM | TEMPERATURE: 98.3 F | HEIGHT: 65 IN | WEIGHT: 236 LBS | DIASTOLIC BLOOD PRESSURE: 80 MMHG | HEART RATE: 77 BPM | SYSTOLIC BLOOD PRESSURE: 130 MMHG | OXYGEN SATURATION: 96 %

## 2017-06-27 DIAGNOSIS — L03.319 CELLULITIS AND ABSCESS OF TRUNK: Primary | ICD-10-CM

## 2017-06-27 DIAGNOSIS — L02.219 CELLULITIS AND ABSCESS OF TRUNK: Primary | ICD-10-CM

## 2017-06-27 PROCEDURE — 99024 POSTOP FOLLOW-UP VISIT: CPT | Performed by: FAMILY MEDICINE

## 2017-06-27 NOTE — MR AVS SNAPSHOT
After Visit Summary   6/27/2017    Steve Morgan    MRN: [id number]           Patient Information     Date Of Birth          1960        Visit Information        Provider Department      6/27/2017 9:30 AM Jessica Fowler MD [facility]        Today's Diagnoses     Cellulitis and abscess of trunk    -  1       Follow-ups after your visit        Your next 10 appointments already scheduled     Jun 28, 2017  9:30 AM CDT   Diabetic Education with Lucía Chávez   [facility] ([facility])    [address] [address] [address] [phone]            Jun 29, 2017  8:00 AM CDT   Return Visit with BRIDGET Payne CNP   [facility] ([facility])    [address] [facility] [address] [phone]              Who to contact     If you have questions or need follow up information about today's clinic visit or your schedule please contact [facility] directly at [phone].  Normal or non-critical lab and imaging results will be communicated to you by AdultSpacehart, letter or phone within 4 business days after the clinic has received the results. If you do not hear from us within 7 days, please contact the clinic through AdultSpacehart or phone. If you have a critical or abnormal lab result, we will notify you by phone as soon as possible.  Submit refill requests through Offerama or call your pharmacy and they will forward the refill request to us. Please allow 3 business days for your refill to be completed.          Additional Information About Your Visit        AdultSpacehart Information     Offerama gives you secure access to your electronic health record. If you see a primary care provider, you can also send messages to your care team and make appointments. If you have questions, please call your primary care clinic.  If you do not have a primary care provider, please call [phone]  "and they will assist you.        Care EveryWhere ID     This is your Care EveryWhere ID. This could be used by other organizations to access your [place] medical records  [id number]        Your Vitals Were     Pulse Temperature Respirations Height Pulse Oximetry BMI (Body Mass Index)    77 98.3  F (36.8  C) (Oral) 16 5' 5\" (1.651 m) 96% 39.27 kg/m2       Blood Pressure from Last 3 Encounters:   06/27/17 130/80   06/26/17 124/79   06/24/17 114/64    Weight from Last 3 Encounters:   06/27/17 236 lb (107 kg)   06/26/17 239 lb (108.4 kg)   06/24/17 238 lb (108 kg)              Today, you had the following     No orders found for display       Primary Care Provider Office Phone # Fax #    Lisa Pierre PA-C [phone]       [address] [address]        Equal Access to Services     KE ALMONTE : Hadii aad ku hadasho Soomaali, waaxda luqadaha, qaybta kaalmada adeegyada, waxay idiin haynikolasn dianna gimenez . So [facility] [phone].    ATENCIÓN: Si habla español, tiene a adler disposición servicios gratuitos de asistencia lingüística. Jean al [phone].    We comply with applicable federal civil rights laws and [place] laws. We do not discriminate on the basis of race, color, national origin, age, disability sex, sexual orientation or gender identity.            Thank you!     Thank you for choosing [facility]  for your care. Our goal is always to provide you with excellent care. Hearing back from our patients is one way we can continue to improve our services. Please take a few minutes to complete the written survey that you may receive in the mail after your visit with us. Thank you!             Your Updated Medication List - Protect others around you: Learn how to safely use, store and throw away your medicines at www.disposemymeds.org.          This list is accurate as of: 6/27/17 10:28 AM.  Always use your most recent med " list.                   Brand Name Dispense Instructions for use Diagnosis    ammonium lactate 12 % cream    LAC-HYDRIN    385 g    Apply topically 2 times daily as needed for dry skin    Diabetic polyneuropathy associated with type 2 diabetes mellitus (H), Pre-ulcerative calluses, Pes planus of both feet       aspirin 81 MG tablet     100    ONE DAILY        atenolol 25 MG tablet    TENORMIN    90 tablet    Take 1 tablet (25 mg) by mouth every morning    Essential hypertension with goal blood pressure less than 140/90       blood glucose monitoring lancets     1 Box    Use to test blood sugars 2 times daily or as directed.    Type 2 diabetes, HbA1C goal < 8% (H)       blood glucose monitoring test strip    ONE TOUCH VERIO IQ    100 strip    Use to test blood sugars 2 times daily or as directed.    Type 2 diabetes, HbA1C goal < 8% (H)       buPROPion 300 MG 24 hr tablet    WELLBUTRIN XL    90 tablet    Take 1 tablet (300 mg) by mouth every morning    Excessive anger       CALCIUM 600 + D PO      Take 1 tablet by mouth daily        citalopram 20 MG tablet    celeXA    90 tablet    Take 1 tablet (20 mg) by mouth daily    Excessive anger       clindamycin 300 MG capsule    CLEOCIN    40 capsule    Take 1 capsule (300 mg) by mouth 4 times daily    Cellulitis and abscess of trunk       ferrous sulfate 325 (65 FE) MG tablet    IRON     Take 1 tablet by mouth daily (with breakfast).        glipiZIDE 10 MG 24 hr tablet    GLUCOTROL XL    180 tablet    Take 2 tablets (20 mg) by mouth every morning    Type 2 diabetes mellitus with diabetic neuropathy, without long-term current use of insulin (H)       hydrocortisone 0.2 % cream    WESTCORT    45 g    Apply topically 2 times daily as needed Apply sparingly to affected area, BID.    Dermatitis       insulin glargine 100 UNIT/ML injection    LANTUS SOLOSTAR    15 mL    Inject 10 units sq q hs    Type 2 diabetes mellitus with diabetic neuropathy, unspecified long term insulin use  status (H)       insulin pen needle 31G X 5 MM    B-D U/F    100 each    Use 1 daily or as directed.    Type 2 diabetes mellitus with diabetic neuropathy, unspecified long term insulin use status (H)       levothyroxine 200 MCG tablet    SYNTHROID/LEVOTHROID    90 tablet    Take 1 tablet (200 mcg) by mouth daily    Hypothyroidism, unspecified type       losartan 50 MG tablet    COZAAR    90 tablet    Take 1 tablet (50 mg) by mouth daily    Essential hypertension with goal blood pressure less than 140/90       metFORMIN 1000 MG tablet    GLUCOPHAGE    180 tablet    Take 1 tablet (1,000 mg) by mouth 2 times daily (with meals)    Type 2 diabetes mellitus with diabetic neuropathy, without long-term current use of insulin (H)       Multi-vitamin Tabs tablet   Generic drug:  multivitamin, therapeutic with minerals     30    1 TABLET DAILY        omeprazole-sodium bicarbonate  MG per capsule    ZEGERID     Take 1 capsule by mouth every morning (before breakfast)        * order for DME     1 Units    Equipment being ordered: single cane with rubber foot    Type 2 diabetes, HbA1C goal < 8% (H), Neuropathy in diabetes (H), Unsteady gait       * order for DME     1 Units    Equipment being ordered: four pronged cane    Unsteady gait, Neuropathy in diabetes (H), Type 2 diabetes, HbA1C goal < 8% (H)       simvastatin 20 MG tablet    ZOCOR    90 tablet    Take 1 tablet (20 mg) by mouth At Bedtime    Hyperlipidemia LDL goal <100       VITAMIN B 12 PO      Take 250 mcg by mouth daily    Excessive anger       VITAMIN D (CHOLECALCIFEROL) PO      Take 1,000 Units by mouth daily.        * Notice:  This list has 2 medication(s) that are the same as other medications prescribed for you. Read the directions carefully, and ask your doctor or other care provider to review them with you.

## 2017-06-27 NOTE — PROGRESS NOTES
SUBJECTIVE:                                                    Steve Morgan is a 57 year old male who presents to clinic today for the following health issues:      Would check - lower abdomen    Improved, still having pain in the lower abscess area.    Problem list and histories reviewed & adjusted, as indicated.  Additional history: as documented    Patient Active Problem List   Diagnosis     Anemia     Morbid obesity due to excess calories (H)     Sleep apnea     Neuropathy in diabetes (H)     Hypothyroidism     HYPERLIPIDEMIA LDL GOAL <100     Hypertension goal BP (blood pressure) < 140/90     Peripheral arterial disease (H)     Tremor     Peripheral neuropathy (H)     Excessive anger     Generalized muscle weakness     Health Care Home     Unsteady gait     DAMIÁN (obstructive sleep apnea)     Vitamin B12 deficiency without anemia     Hyponatremia     Chronic hyponatremia     Type 2 diabetes mellitus with diabetic neuropathy (H)     Depression     Past Surgical History:   Procedure Laterality Date     COLONOSCOPY       GENITOURINARY SURGERY      surg for undescended testicle     REPAIR HAMMER TOE BILATERAL  5/16/2013    Procedure: REPAIR HAMMER TOE BILATERAL;  Flexor Tenotomy Toes 2,3,4,5 Bilateral Feet;  Surgeon: Saad Bangura DPM;  Location:  OR       Social History   Substance Use Topics     Smoking status: Never Smoker     Smokeless tobacco: Never Used     Alcohol use Yes      Comment: occ     Family History   Problem Relation Age of Onset     Breast Cancer Mother      Hypertension Mother      Thyroid Disease Mother      Depression Mother      Cancer - colorectal Father      Thyroid Disease Father      Depression Father      HEART DISEASE Maternal Grandmother      CHF     Circulatory Paternal Grandmother      CANCER Paternal Grandfather      DIABETES Brother      DIABETES Brother          Current Outpatient Prescriptions   Medication Sig Dispense Refill     clindamycin (CLEOCIN) 300 MG capsule Take  1 capsule (300 mg) by mouth 4 times daily 40 capsule 0     ammonium lactate (LAC-HYDRIN) 12 % cream Apply topically 2 times daily as needed for dry skin 385 g 3     insulin glargine (LANTUS SOLOSTAR) 100 UNIT/ML injection Inject 10 units sq q hs 15 mL 3     insulin pen needle (B-D U/F) 31G X 5 MM Use 1 daily or as directed. 100 each prn     buPROPion (WELLBUTRIN XL) 300 MG 24 hr tablet Take 1 tablet (300 mg) by mouth every morning 90 tablet 1     citalopram (CELEXA) 20 MG tablet Take 1 tablet (20 mg) by mouth daily 90 tablet 1     simvastatin (ZOCOR) 20 MG tablet Take 1 tablet (20 mg) by mouth At Bedtime 90 tablet 3     atenolol (TENORMIN) 25 MG tablet Take 1 tablet (25 mg) by mouth every morning 90 tablet 1     losartan (COZAAR) 50 MG tablet Take 1 tablet (50 mg) by mouth daily 90 tablet 0     levothyroxine (SYNTHROID/LEVOTHROID) 200 MCG tablet Take 1 tablet (200 mcg) by mouth daily 90 tablet 1     hydrocortisone (WESTCORT) 0.2 % cream Apply topically 2 times daily as needed Apply sparingly to affected area, BID. 45 g 1     glipiZIDE (GLUCOTROL XL) 10 MG 24 hr tablet Take 2 tablets (20 mg) by mouth every morning 180 tablet 1     metFORMIN (GLUCOPHAGE) 1000 MG tablet Take 1 tablet (1,000 mg) by mouth 2 times daily (with meals) 180 tablet 1     omeprazole-sodium bicarbonate (ZEGERID)  MG per capsule Take 1 capsule by mouth every morning (before breakfast)       blood glucose (ONE TOUCH VERIO IQ) test strip Use to test blood sugars 2 times daily or as directed. 100 strip 0     blood glucose monitoring (ONE TOUCH DELICA) lancets Use to test blood sugars 2 times daily or as directed. 1 Box prn     Cyanocobalamin (VITAMIN B 12 PO) Take 250 mcg by mouth daily       Calcium Carb-Cholecalciferol (CALCIUM 600 + D PO) Take 1 tablet by mouth daily       ORDER FOR DME Equipment being ordered: four pronged cane 1 Units 0     ORDER FOR DME Equipment being ordered: single cane with rubber foot 1 Units 0     VITAMIN D,  "CHOLECALCIFEROL, PO Take 1,000 Units by mouth daily.       ferrous sulfate 325 (65 FE) MG tablet Take 1 tablet by mouth daily (with breakfast).       ASPIRIN 81 MG OR TABS ONE DAILY 100 3     MULTI-VITAMIN OR TABS 1 TABLET DAILY 30 0       Reviewed and updated as needed this visit by clinical staff  Tobacco  Allergies  Fam Hx  Soc Hx      Reviewed and updated as needed this visit by Provider         ROS:      OBJECTIVE:     /80 (BP Location: Left arm, Patient Position: Chair, Cuff Size: Adult Large)  Pulse 77  Temp 98.3  F (36.8  C) (Oral)  Resp 16  Ht 5' 5\" (1.651 m)  Wt 236 lb (107 kg)  SpO2 96%  BMI 39.27 kg/m2  Body mass index is 39.27 kg/(m^2).  Skin: abscess is slightly smaller, tender to touch, no erythema surrounding the abscess today.        ASSESSMENT/PLAN:             1. Cellulitis and abscess of trunk  Continue on ABx, covered with band aid, follow up if symptoms worsen, or the abscess is getting larger or draining more pus.          Jessica Fowler MD  [facility]"

## 2017-06-27 NOTE — Clinical Note
Please abstract the following data from this visit with this patient into the appropriate field in Epic:  Eye exam with ophthalmology on this date: 6/23/17

## 2017-06-27 NOTE — NURSING NOTE
"Chief Complaint   Patient presents with     Wound Check     lower abdomen       Initial /80 (BP Location: Left arm, Patient Position: Chair, Cuff Size: Adult Large)  Pulse 77  Temp 98.3  F (36.8  C) (Oral)  Resp 16  Ht 5' 5\" (1.651 m)  Wt 236 lb (107 kg)  SpO2 96%  BMI 39.27 kg/m2 Estimated body mass index is 39.27 kg/(m^2) as calculated from the following:    Height as of this encounter: 5' 5\" (1.651 m).    Weight as of this encounter: 236 lb (107 kg).  Medication Reconciliation: complete   Margaret Walters, CESAR      "

## 2017-06-28 ENCOUNTER — ALLIED HEALTH/NURSE VISIT (OUTPATIENT)
Dept: EDUCATION SERVICES | Facility: CLINIC | Age: 57
End: 2017-06-28
Payer: COMMERCIAL

## 2017-06-28 DIAGNOSIS — E11.40 TYPE 2 DIABETES MELLITUS WITH DIABETIC NEUROPATHY, UNSPECIFIED LONG TERM INSULIN USE STATUS: Primary | ICD-10-CM

## 2017-06-28 PROCEDURE — G0108 DIAB MANAGE TRN  PER INDIV: HCPCS | Performed by: DIETITIAN, REGISTERED

## 2017-06-28 RX ORDER — LANCETS
1 EACH MISCELLANEOUS 3 TIMES DAILY
Qty: 102 EACH | Refills: 3 | Status: SHIPPED | OUTPATIENT
Start: 2017-06-28 | End: 2017-09-07

## 2017-06-28 NOTE — NURSING NOTE
"Diabetes Self Management Training: Follow-up Visit    Steve Morgan presents today for evaluation of glucose control related to Type 2 diabetes.    He is accompanied by self    Patient's diabetes management related comments/concerns: \"I've been naughty, I haven't checked my blood sugar in a couple of days\"    Patient would like this visit to be focused around the following diabetes-related behaviors and goals: Monitoring    ASSESSMENT:  Patient Problem List reviewed for relevant medical history and current medical status.    Current Diabetes Management per Patient:      Diabetes Medication(s)     Biguanides Sig    metFORMIN (GLUCOPHAGE) 1000 MG tablet Take 1 tablet (1,000 mg) by mouth 2 times daily (with meals)    Insulin Sig    insulin glargine (LANTUS SOLOSTAR) 100 UNIT/ML injection Inject 10 units sq q hs    Sulfonylureas Sig    glipiZIDE (GLUCOTROL XL) 10 MG 24 hr tablet Take 2 tablets (20 mg) by mouth every morning        Patient glucose self monitoring as follows: 0-2x/day.   BG results:     Date Fasting Bedtime   6/14 256    6/17  113   6/18  186   6/19  190   6/20  325     BG values are: Not in goal  Patient's most recent   Lab Results   Component Value Date    A1C 10.0 05/17/2017    is not meeting goal of <7.0    Nutrition:  Patient reports he did not take time to keep a food record, but is interested in doing so prior to next visit.     Physical Activity:    Not discussed    Diabetes Complications:  Discussed risk    Vitals:  There were no vitals taken for this visit.  Estimated body mass index is 39.27 kg/(m^2) as calculated from the following:    Height as of 6/27/17: 1.651 m (5' 5\").    Weight as of 6/27/17: 107 kg (236 lb).   Last 3 BP:   BP Readings from Last 3 Encounters:   06/27/17 130/80   06/26/17 124/79   06/24/17 114/64       History   Smoking Status     Never Smoker   Smokeless Tobacco     Never Used       Labs:  Lab Results   Component Value Date    A1C 10.0 05/17/2017     Lab Results " "  Component Value Date     05/17/2017     Lab Results   Component Value Date     05/17/2017     HDL Cholesterol   Date Value Ref Range Status   05/17/2017 30 (L) >39 mg/dL Final   ]  GFR Estimate   Date Value Ref Range Status   05/17/2017 75 >60 mL/min/1.7m2 Final     Comment:     Non  GFR Calc     GFR Estimate If Black   Date Value Ref Range Status   05/17/2017 >90   GFR Calc   >60 mL/min/1.7m2 Final     Lab Results   Component Value Date    CR 1.02 05/17/2017     No results found for: MICROALBUMIN    Health Beliefs and Attitudes:   Patient Activation Measure Survey Score:  SHANNON Score (Last Two) 3/8/2011 3/30/2017   SHANNON Raw Score 44 29   Activation Score 70.8 52.9   SHANNON Level 4 2       Stage of Change: ACTION (Actively working towards change)    Diabetes knowledge and skills assessment:     Patient is knowledgeable in diabetes management concepts related to: Healthy Eating, Monitoring and Taking Medication    Patient needs further education on the following diabetes management concepts: Monitoring, Taking Medication, Problem Solving and Reducing Risks    Barriers to Learning Assessment: No Barriers identified    Based on learning assessment above, most appropriate setting for further diabetes education would be: Individual setting.    INTERVENTION:  Patient having difficulty monitoring BG last few days, \"because unable to change the lancet in his lancing device\". Assisted patient with this process during visit. Fasting BG in office today was 165 mg/dl. Provided patient with a new FastClix lancing device as he will only need to change the lancing drum periodically which should be much easier for him.  Patient pleased with this option, as he experiences trouble with dexterity and shakiness. Reports he is doing ok with the insulin injections so far.     Education provided today on:  AADE Self-Care Behaviors:  Monitoring: proper technique using Fastclix Lancing device, rx " for lancing drums sent to preferred pharmacy  Taking Medication: insulin dosing - recommend increase in Lantus - however patient prefers to wait until his visit tomorrow with Diana Butler NP.   Problem Solving: high blood glucose - causes, signs/symptoms, treatment and prevention, low blood glucose - causes, signs/symptoms, treatment and prevention and when to call health care provider  Reducing Risks: discussed challenges with current infection and reinforced the importance of glycemic control to help with healing process    Opportunities for ongoing education and support in diabetes-self management were discussed.    Pt verbalized understanding of concepts discussed and recommendations provided today.       Education Materials Provided:  No new materials provided today    PLAN:  Check blood sugars fasting and 2 hours after the start of meals (supper). Start using Fastclix Lancing device if that is easier.   Keep a food record for the next visit.  Recommend increase to Lantus insulin - deferred to Endo visit tomorrow per patient request.    FOLLOW-UP:  Follow-up appointment scheduled 1 month.  Follow-up planned for BG review by phone/e-mail/[employer] in 1-2 weeks.  Chart routed to referring provider.    Ongoing plan for education and support: Smartphone Glenys(s), Written resources (magazines, books, etc.), Follow-up visit with diabetes educator  and Follow-up with primary care provider    Lucía Chávez RD, MELISAE  Diabetes     Time Spent: 30 minutes  Encounter Type: Individual    Any diabetes medication dose changes were made via the CDE Protocol and Collaborative Practice Agreement with the patient's primary care provider. A copy of this encounter was shared with the provider.

## 2017-06-28 NOTE — MR AVS SNAPSHOT
After Visit Summary   6/28/2017    Steve Morgan    MRN: [id number]           Patient Information     Date Of Birth          1960        Visit Information        Provider Department      6/28/2017 9:30 AM Lucía Chávez [facility]        Today's Diagnoses     Type 2 diabetes mellitus with diabetic neuropathy, unspecified long term insulin use status (H)    -  1       Follow-ups after your visit        Your next 10 appointments already scheduled     Jun 29, 2017  8:00 AM CDT   [facility] Visit with BRIDGET Payne CNP   [facility]    [address] [facility] [id number] [phone]            Jul 26, 2017 10:30 AM CDT   Diabetic Education with Lucía Chávez   [facility]    [address]  [facility] [id number] [phone]              Who to contact     If you have questions or need follow up information about today's clinic visit or your schedule please contact [facility] directly at [phone].  Normal or non-critical lab and imaging results will be communicated to you by [employer]er[employer]art, letter or phone within 4 business days after the clinic has received the results. If you do not hear from us within 7 days, please contact the clinic through O[employer] or phone. If you have a critical or abnormal lab result, we will notify you by phone as soon as possible.  Submit refill requests through [employer] or call your pharmacy and they will forward the refill request to us. Please allow 3 business days for your refill to be completed.          Additional Information About Your Visit        [employer] Information     [employer] gives you secure access to your electronic health record. If you see a primary care provider, you can also send messages to your care team and make appointments. If you have questions, please call your primary care clinic.  If  you do not have a primary care provider, please call [phone] and they will assist you.        Care EveryWhere ID     This is your Care EveryWhere ID. This could be used by other organizations to access your [facility] medical records  [id number]         Blood Pressure from Last 3 Encounters:   06/27/17 130/80   06/26/17 124/79   06/24/17 114/64    Weight from Last 3 Encounters:   06/27/17 107 kg (236 lb)   06/26/17 108.4 kg (239 lb)   06/24/17 108 kg (238 lb)              We Performed the Following     DIABETES EDUCATION - Individual  []          Today's Medication Changes          These changes are accurate as of: 6/28/17 10:48 AM.  If you have any questions, ask your nurse or doctor.               These medicines have changed or have updated prescriptions.        Dose/Directions    * blood glucose monitoring lancets   This may have changed:  Another medication with the same name was added. Make sure you understand how and when to take each.   Used for:  Type 2 diabetes, HbA1C goal < 8% (H)        Use to test blood sugars 2 times daily or as directed.   Quantity:  1 Box   Refills:  prn       * blood glucose monitoring lancets   This may have changed:  You were already taking a medication with the same name, and this prescription was added. Make sure you understand how and when to take each.   Used for:  Type 2 diabetes mellitus with diabetic neuropathy, unspecified long term insulin use status (H)        Dose:  1 each   1 each 3 times daily Use to test blood sugar 3 times daily or as directed.   Quantity:  102 each   Refills:  3       * Notice:  This list has 2 medication(s) that are the same as other medications prescribed for you. Read the directions carefully, and ask your doctor or other care provider to review them with you.         Where to get your medicines      These medications were sent to [facility]/pharmacy #[id number] - [place], [place] - [address]  [address] [facility] [address]     Phone:   [phone]     blood glucose monitoring lancets                Primary Care Provider Office Phone # Fax #    Lisa Sue Pierre PA-C [phone]       [facility] [address]  [facility] [address]        Equal Access to Services     KE ALMONTE : Hadii aad ku hadlilyo Soomaali, waaxda luqadaha, qaybta kaalmada adeegyada, waxay idiin haynikolasn adedestinee curtis ammy schwartz. So [facility] [phone].    ATENCIÓN: Si habla español, tiene a adler disposición servicios gratuitos de asistencia lingüística. Llame al [phone].    We comply with applicable federal civil rights laws and [place] laws. We do not discriminate on the basis of race, color, national origin, age, disability sex, sexual orientation or gender identity.            Thank you!     Thank you for choosing [facility]  for your care. Our goal is always to provide you with excellent care. Hearing back from our patients is one way we can continue to improve our services. Please take a few minutes to complete the written survey that you may receive in the mail after your visit with us. Thank you!             Your Updated Medication List - Protect others around you: Learn how to safely use, store and throw away your medicines at www.disposemymeds.org.          This list is accurate as of: 6/28/17 10:48 AM.  Always use your most recent med list.                   Brand Name Dispense Instructions for use Diagnosis    ammonium lactate 12 % cream    LAC-HYDRIN    385 g    Apply topically 2 times daily as needed for dry skin    Diabetic polyneuropathy associated with type 2 diabetes mellitus (H), Pre-ulcerative calluses, Pes planus of both feet       aspirin 81 MG tablet     100    ONE DAILY        atenolol 25 MG tablet    TENORMIN    90 tablet    Take 1 tablet (25 mg) by mouth every morning    Essential hypertension with goal blood pressure less than 140/90       * blood glucose monitoring lancets     1 Box    Use to  test blood sugars 2 times daily or as directed.    Type 2 diabetes, HbA1C goal < 8% (H)       * blood glucose monitoring lancets     102 each    1 each 3 times daily Use to test blood sugar 3 times daily or as directed.    Type 2 diabetes mellitus with diabetic neuropathy, unspecified long term insulin use status (H)       blood glucose monitoring test strip    ONE TOUCH VERIO IQ    100 strip    Use to test blood sugars 2 times daily or as directed.    Type 2 diabetes, HbA1C goal < 8% (H)       buPROPion 300 MG 24 hr tablet    WELLBUTRIN XL    90 tablet    Take 1 tablet (300 mg) by mouth every morning    Excessive anger       CALCIUM 600 + D PO      Take 1 tablet by mouth daily        citalopram 20 MG tablet    celeXA    90 tablet    Take 1 tablet (20 mg) by mouth daily    Excessive anger       clindamycin 300 MG capsule    CLEOCIN    40 capsule    Take 1 capsule (300 mg) by mouth 4 times daily    Cellulitis and abscess of trunk       ferrous sulfate 325 (65 FE) MG tablet    IRON     Take 1 tablet by mouth daily (with breakfast).        glipiZIDE 10 MG 24 hr tablet    GLUCOTROL XL    180 tablet    Take 2 tablets (20 mg) by mouth every morning    Type 2 diabetes mellitus with diabetic neuropathy, without long-term current use of insulin (H)       hydrocortisone 0.2 % cream    WESTCORT    45 g    Apply topically 2 times daily as needed Apply sparingly to affected area, BID.    Dermatitis       insulin glargine 100 UNIT/ML injection    LANTUS SOLOSTAR    15 mL    Inject 10 units sq q hs    Type 2 diabetes mellitus with diabetic neuropathy, unspecified long term insulin use status (H)       insulin pen needle 31G X 5 MM    B-D U/F    100 each    Use 1 daily or as directed.    Type 2 diabetes mellitus with diabetic neuropathy, unspecified long term insulin use status (H)       levothyroxine 200 MCG tablet    SYNTHROID/LEVOTHROID    90 tablet    Take 1 tablet (200 mcg) by mouth daily    Hypothyroidism, unspecified type        losartan 50 MG tablet    COZAAR    90 tablet    Take 1 tablet (50 mg) by mouth daily    Essential hypertension with goal blood pressure less than 140/90       metFORMIN 1000 MG tablet    GLUCOPHAGE    180 tablet    Take 1 tablet (1,000 mg) by mouth 2 times daily (with meals)    Type 2 diabetes mellitus with diabetic neuropathy, without long-term current use of insulin (H)       Multi-vitamin Tabs tablet   Generic drug:  multivitamin, therapeutic with minerals     30    1 TABLET DAILY        omeprazole-sodium bicarbonate  MG per capsule    ZEGERID     Take 1 capsule by mouth every morning (before breakfast)        * order for DME     1 Units    Equipment being ordered: single cane with rubber foot    Type 2 diabetes, HbA1C goal < 8% (H), Neuropathy in diabetes (H), Unsteady gait       * order for DME     1 Units    Equipment being ordered: four pronged cane    Unsteady gait, Neuropathy in diabetes (H), Type 2 diabetes, HbA1C goal < 8% (H)       simvastatin 20 MG tablet    ZOCOR    90 tablet    Take 1 tablet (20 mg) by mouth At Bedtime    Hyperlipidemia LDL goal <100       VITAMIN B 12 PO      Take 250 mcg by mouth daily    Excessive anger       VITAMIN D (CHOLECALCIFEROL) PO      Take 1,000 Units by mouth daily.        * Notice:  This list has 4 medication(s) that are the same as other medications prescribed for you. Read the directions carefully, and ask your doctor or other care provider to review them with you.

## 2017-06-29 ENCOUNTER — OFFICE VISIT (OUTPATIENT)
Dept: ENDOCRINOLOGY | Facility: CLINIC | Age: 57
End: 2017-06-29
Payer: COMMERCIAL

## 2017-06-29 VITALS
SYSTOLIC BLOOD PRESSURE: 130 MMHG | DIASTOLIC BLOOD PRESSURE: 72 MMHG | HEIGHT: 65 IN | WEIGHT: 238 LBS | BODY MASS INDEX: 39.65 KG/M2 | HEART RATE: 80 BPM

## 2017-06-29 DIAGNOSIS — E11.40 TYPE 2 DIABETES MELLITUS WITH DIABETIC NEUROPATHY, UNSPECIFIED LONG TERM INSULIN USE STATUS: ICD-10-CM

## 2017-06-29 PROCEDURE — 99214 OFFICE O/P EST MOD 30 MIN: CPT | Performed by: CLINICAL NURSE SPECIALIST

## 2017-06-29 NOTE — PATIENT INSTRUCTIONS
Increase Lantus to 12 units.    Please start testing your blood sugars twice every day - before breakfast and again at night when you take the Lantus injection.    I want you to increase Lantus to 15 units in another 2-3 days if the blood sugar readings are over 130.  Please send me your blood sugar readings in one week, just type them in a [employer] message to me.    Follow up with Lucía on 7/26  Follow up with me in 2 months - Tuesday, 8/29 at 10 am    Diana Butler NP  Endocrinology

## 2017-06-29 NOTE — MR AVS SNAPSHOT
After Visit Summary   6/29/2017    Steve Morgan    MRN: [id number]           Patient Information     Date Of Birth          1960        Visit Information        Provider Department      6/29/2017 8:00 AM Diana Butler APRN CNP [facility]        Care Instructions        Increase Lantus to 12 units.    Please start testing your blood sugars twice every day - before breakfast and again at night when you take the Lantus injection.    I want you to increase Lantus to 15 units in another 2-3 days if the blood sugar readings are over 130.  Please send me your blood sugar readings in one week, just type them in a [employer] message to me.    Follow up with Lucía on 7/26  Follow up with me in 2 months - Tuesday, 8/29 at 10 am    Diana Butler NP  Endocrinology            Follow-ups after your visit        Your next 10 appointments already scheduled     Jul 26, 2017 10:30 AM CDT   Diabetic Education with Lucía Chávez   [facility]    [address] [facility] [id number] [phone]              Who to contact     If you have questions or need follow up information about today's clinic visit or your schedule please contact [facility] directly at [phone].  Normal or non-critical lab and imaging results will be communicated to you by MyChart, letter or phone within 4 business days after the clinic has received the results. If you do not hear from us within 7 days, please contact the clinic through MyChart or phone. If you have a critical or abnormal lab result, we will notify you by phone as soon as possible.  Submit refill requests through [employer] or call your pharmacy and they will forward the refill request to us. Please allow 3 business days for your refill to be completed.          Additional Information About Your Visit        [employer] Information     [employer] gives you secure access to your  "electronic health record. If you see a primary care provider, you can also send messages to your care team and make appointments. If you have questions, please call your primary care clinic.  If you do not have a primary care provider, please call [phone] and they will assist you.        Care EveryWhere ID     This is your Care EveryWhere ID. This could be used by other organizations to access your [facility] medical records  [id number]        Your Vitals Were     Pulse Height BMI (Body Mass Index)             80 5' 5\" (1.651 m) 39.61 kg/m2          Blood Pressure from Last 3 Encounters:   06/29/17 130/72   06/27/17 130/80   06/26/17 124/79    Weight from Last 3 Encounters:   06/29/17 238 lb (108 kg)   06/27/17 236 lb (107 kg)   06/26/17 239 lb (108.4 kg)              Today, you had the following     No orders found for display       Primary Care Provider Office Phone # Fax #    Lisa Sue Pierre PA-C [phone]       [facility] [id number] [place] [address]        Equal Access to Services     [facility] : Hadii aad ku hadasho Soomaali, waaxda luqadaha, qaybta kaalmada adeegyada, jayson gimenez . So [facility] [phone].    ATENCIÓN: Si habla español, tiene a adler disposición servicios gratuitos de asistencia lingüística. Jean al [phone].    We comply with applicable federal civil rights laws and [place] laws. We do not discriminate on the basis of race, color, national origin, age, disability sex, sexual orientation or gender identity.            Thank you!     Thank you for choosing [facility]  for your care. Our goal is always to provide you with excellent care. Hearing back from our patients is one way we can continue to improve our services. Please take a few minutes to complete the written survey that you may receive in the mail after your visit with us. Thank you!             Your Updated Medication List - " Protect others around you: Learn how to safely use, store and throw away your medicines at www.disposemymeds.org.          This list is accurate as of: 6/29/17  8:39 AM.  Always use your most recent med list.                   Brand Name Dispense Instructions for use Diagnosis    ammonium lactate 12 % cream    LAC-HYDRIN    385 g    Apply topically 2 times daily as needed for dry skin    Diabetic polyneuropathy associated with type 2 diabetes mellitus (H), Pre-ulcerative calluses, Pes planus of both feet       aspirin 81 MG tablet     100    ONE DAILY        atenolol 25 MG tablet    TENORMIN    90 tablet    Take 1 tablet (25 mg) by mouth every morning    Essential hypertension with goal blood pressure less than 140/90       * blood glucose monitoring lancets     1 Box    Use to test blood sugars 2 times daily or as directed.    Type 2 diabetes, HbA1C goal < 8% (H)       * blood glucose monitoring lancets     102 each    1 each 3 times daily Use to test blood sugar 3 times daily or as directed.    Type 2 diabetes mellitus with diabetic neuropathy, unspecified long term insulin use status (H)       blood glucose monitoring test strip    ONE TOUCH VERIO IQ    100 strip    Use to test blood sugars 2 times daily or as directed.    Type 2 diabetes, HbA1C goal < 8% (H)       buPROPion 300 MG 24 hr tablet    WELLBUTRIN XL    90 tablet    Take 1 tablet (300 mg) by mouth every morning    Excessive anger       CALCIUM 600 + D PO      Take 1 tablet by mouth daily        citalopram 20 MG tablet    celeXA    90 tablet    Take 1 tablet (20 mg) by mouth daily    Excessive anger       clindamycin 300 MG capsule    CLEOCIN    40 capsule    Take 1 capsule (300 mg) by mouth 4 times daily    Cellulitis and abscess of trunk       ferrous sulfate 325 (65 FE) MG tablet    IRON     Take 1 tablet by mouth daily (with breakfast).        glipiZIDE 10 MG 24 hr tablet    GLUCOTROL XL    180 tablet    Take 2 tablets (20 mg) by mouth every morning     Type 2 diabetes mellitus with diabetic neuropathy, without long-term current use of insulin (H)       hydrocortisone 0.2 % cream    WESTCORT    45 g    Apply topically 2 times daily as needed Apply sparingly to affected area, BID.    Dermatitis       insulin glargine 100 UNIT/ML injection    LANTUS SOLOSTAR    15 mL    Inject 10 units sq q hs    Type 2 diabetes mellitus with diabetic neuropathy, unspecified long term insulin use status (H)       insulin pen needle 31G X 5 MM    B-D U/F    100 each    Use 1 daily or as directed.    Type 2 diabetes mellitus with diabetic neuropathy, unspecified long term insulin use status (H)       levothyroxine 200 MCG tablet    SYNTHROID/LEVOTHROID    90 tablet    Take 1 tablet (200 mcg) by mouth daily    Hypothyroidism, unspecified type       losartan 50 MG tablet    COZAAR    90 tablet    Take 1 tablet (50 mg) by mouth daily    Essential hypertension with goal blood pressure less than 140/90       metFORMIN 1000 MG tablet    GLUCOPHAGE    180 tablet    Take 1 tablet (1,000 mg) by mouth 2 times daily (with meals)    Type 2 diabetes mellitus with diabetic neuropathy, without long-term current use of insulin (H)       Multi-vitamin Tabs tablet   Generic drug:  multivitamin, therapeutic with minerals     30    1 TABLET DAILY        omeprazole-sodium bicarbonate  MG per capsule    ZEGERID     Take 1 capsule by mouth every morning (before breakfast)        * order for DME     1 Units    Equipment being ordered: single cane with rubber foot    Type 2 diabetes, HbA1C goal < 8% (H), Neuropathy in diabetes (H), Unsteady gait       * order for DME     1 Units    Equipment being ordered: four pronged cane    Unsteady gait, Neuropathy in diabetes (H), Type 2 diabetes, HbA1C goal < 8% (H)       simvastatin 20 MG tablet    ZOCOR    90 tablet    Take 1 tablet (20 mg) by mouth At Bedtime    Hyperlipidemia LDL goal <100       VITAMIN B 12 PO      Take 250 mcg by mouth daily    Excessive  anger       VITAMIN D (CHOLECALCIFEROL) PO      Take 1,000 Units by mouth daily.        * Notice:  This list has 4 medication(s) that are the same as other medications prescribed for you. Read the directions carefully, and ask your doctor or other care provider to review them with you.

## 2017-06-29 NOTE — PROGRESS NOTES
"Name: Steve \"Caio\" Cathy  Seen at the request of Lisa Pierre for Diabetes (Last seen 5/23/2017).  HPI:  Steve Morgan is a 57 year old male who presents for the evaluation/management of:    1. Type 2 DM:  Orginally diagnosed at the age of 33 or 38.  Was prediabetic prior, was not particularly symptomatic at the time, was noted on routine lab work  Current Regimen: Metformin 1000 mg bid, glipzide 20 mg q am, Lantus 10 units qd (started 5/2017)  BS checks: Infrequent - #4 tests in the past 2 weeks  Average Meter Download: Unable to download meter due to brand, review of meter memory: avg ,  (7-day, n2 - 165, 261), 207 (14-day, 113, 325).    Thinks increased blood sugar due to dietary indiscretions, states he hasn't been watching his diet as carefully.  Likes to snack between meals, buys snacks at the gas station.    Currently has lower right abdominal infection/ abcess was opened on Saturday, on antibiotics.    Complications:   Diabetes Complications  Description / Detail    Diabetic Retinopathy  No retinopathy, early cataract, last exam about 1 year ago   CAD / PAD  No   Neuropathy  Yes: numbness hands and feet   Nephropathy / Microalbuminuria  No   Gastroparesis  No   Hypoglycemia Unawarness  Not sure, gets 'kind of dizzy' when blood sugar is low but reports history of low blood sugars without symptoms     2. Hypertension: Blood Pressure today:   BP Readings from Last 3 Encounters:   06/29/17 130/72   06/27/17 130/80   06/26/17 124/79   .  Blood pressure medications include atenolol 25 mg qd and losartan 50 mg qd  .  Takes medications everyday without forgetting a dose.  Denies feeling lightheaded or dizzy.   3. Hyperlipidemia: Takes simvastatin 20 mg for lipid control.  Denies muscle aches of pains.       4. Prevention:  Flu Shot- 11/2016  Pneumovax- 2012  Opthalmology-Yes: due 6/2017  Dental-Yes: twice a year  ASA-Yes, 81 mg qd (recently forgot to take this but will start taking it " again)  Smoking- No  5.  Hypothyroidism. Currently treated with levothyroxine 200 mcg daily.  Takes the levothyroxine first thing in the morning and waits 30+ minutes before eating breakfast.    PMH/PSH:  Past Medical History:   Diagnosis Date     Cellulitis and abscess of trunk 6/27/2017     Depression      Hyperlipidemia LDL goal <100 10/31/2010     Hypertension goal BP (blood pressure) < 140/90 3/17/2011     Hypothyroidism 1/12/2010     Morbid obesity due to excess calories (H) 1/12/2010     Neuropathy in diabetes (H) 1/12/2010     Obesity 1/12/2010     Sleep apnea 1/12/2010    CPAP     Type 2 diabetes, HbA1C goal < 8% (H) 3/8/2011     Past Surgical History:   Procedure Laterality Date     COLONOSCOPY       GENITOURINARY SURGERY      surg for undescended testicle     REPAIR HAMMER TOE BILATERAL  5/16/2013    Procedure: REPAIR HAMMER TOE BILATERAL;  Flexor Tenotomy Toes 2,3,4,5 Bilateral Feet;  Surgeon: Saad Bangura DPM;  Location: [facility] OR     Family Hx:  Family History   Problem Relation Age of Onset     Breast Cancer Mother      Hypertension Mother      Thyroid Disease Mother      Depression Mother      Cancer - colorectal Father      Thyroid Disease Father      Depression Father      HEART DISEASE Maternal Grandmother      CHF     Circulatory Paternal Grandmother      CANCER Paternal Grandfather      DIABETES Brother      DIABETES Brother      Thyroid disease: Yes: mother         DM2: Yes: one brother with type 1 diabetes and one brother with type 2 diabetes, paternal aunt with DM2         Autoimmune: DM1, SLE, RA, Vitiligo Yes: brother with DM1    Social Hx:  Social History     Social History     Marital status:      Spouse name: Sri     Number of children: 2     Years of education: N/A     Occupational History      None      [employer]     Social History Main Topics     Smoking status: Never Smoker     Smokeless tobacco: Never Used     Alcohol use Yes      Comment: occ     Drug use:  "No     Sexual activity: Yes     Partners: Female     Other Topics Concern     Parent/Sibling W/ Cabg, Mi Or Angioplasty Before 65f 55m? No     Social History Narrative          MEDICATIONS:  has a current medication list which includes the following prescription(s): blood glucose monitoring, clindamycin, ammonium lactate, insulin glargine, insulin pen needle, bupropion, citalopram, simvastatin, atenolol, losartan, levothyroxine, hydrocortisone, glipizide, metformin, omeprazole-sodium bicarbonate, blood glucose monitoring, blood glucose monitoring, cyanocobalamin, calcium carb-cholecalciferol, order for dme, order for dme, cholecalciferol, ferrous sulfate, aspirin, and multi-vitamin, and the following Facility-Administered Medications: cefazolin.    ROS     ROS: 10 point ROS neg other than the symptoms noted above in the HPI.    Physical Exam   VS: /72 (BP Location: Left arm, Patient Position: Chair, Cuff Size: Adult Large)  Pulse 80  Ht 1.651 m (5' 5\")  Wt 108 kg (238 lb)  BMI 39.61 kg/m2  GENERAL: AXOX3, NAD, well dressed, answering questions appropriately, appears stated age.  HEENT: OP clear, no LAD, no TM, non-tender, no exopthalmous, no proptosis, EOMI, no lig lag, no retraction  CV: RRR, no rubs, gallops, no murmurs  LUNGS: CTAB, no wheezes, rales, or ronchi  ABDOMEN: soft, nontender, nondistended, +BS, no organomegaly  EXTREMITIES: no edema, +pulses, no rashes, no lesions  NEUROLOGY: CN grossly intact, + monofilament, + DTR upper and lower extremity, no tremors  MSK: grossly intact  SKIN: no rashes, no lesions    LABS:  A1c:   Component    Latest Ref Rng & Units 5/19/2016 11/15/2016 12/22/2016 5/17/2017   Hemoglobin A1C    4.3 - 6.0 % 7.5 (H) 8.8 (H) 7.7 (H) 10.0 (H)     Basic Metabolic Panel:  !COMPREHENSIVE Latest Ref Rng & Units 5/19/2016 5/17/2017   SODIUM 133 - 144 mmol/L 129 (L) 131 (L)   POTASSIUM 3.4 - 5.3 mmol/L 4.8 4.6   CHLORIDE 94 - 109 mmol/L 95 98   BUN 7 - 30 mg/dL 17 10   Creatinine " 0.66 - 1.25 mg/dL 1.17 1.02   Glucose 70 - 99 mg/dL 176 (H) 368 (H)   ANION GAP 3 - 14 mmol/L 7 9   CALCIUM 8.5 - 10.1 mg/dL 9.6 9.2     LFTS/Cholesterol Panel:  !LIPID/HEPATIC Latest Ref Rng & Units 5/19/2016 5/17/2017   CHOLESTEROL <200 mg/dL 156 192   TRIGLYCERIDES <150 mg/dL 219 (H) 179 (H)   HDL CHOLESTEROL >39 mg/dL 34 (L) 30 (L)   LDL CHOLESTEROL, CALCULATED <100 mg/dL 78 126 (H)   VLDL-CHOLESTEROL 0 - 30 mg/dL     NON HDL CHOLESTEROL <130 mg/dL 122 162 (H)   CHOLESTEROL/HDL RATIO 0.0 - 5.0     AST 0 - 45 U/L 19 22   ALT 0 - 70 U/L 29 35     Thyroid Function:   !THYROID Latest Ref Rng & Units 12/22/2016 11/15/2016 5/19/2016   TSH 0.40 - 4.00 mU/L 0.48 11.71 (H) 5.15 (H)   T4 FREE 0.76 - 1.46 ng/dL  1.06 0.94     !THYROID Latest Ref Rng & Units 8/26/2015 6/26/2015 6/26/2014   TSH 0.40 - 4.00 mU/L 6.21 (H) 7.54 (H) 3.69   T4 FREE 0.76 - 1.46 ng/dL 1.04 1.06      Urine MicroAlbumin:  Component    Latest Ref Rng & Units 11/15/2016   Creatinine Urine    mg/dL 17   Albumin Urine mg/L    mg/L <5   Albumin Urine mg/g Cr    0 - 17 mg/g Cr Unable to calc     Vitamin D:    All pertinent notes, labs, and images personally reviewed by me.     A/P  Mr.Walter Morgan is a 57 year old here for the evaluation/management of diabetes:    1. DM2 - Uncontrolled.  Increase Lantus to 12 units q hs, then 15 units if no lows.  Test blood sugar regularly twice daily, before breakfast and at bedtime.  Mychart message me with the BG readings in one week for review.  There is some variability among people, most will usually develop symptoms suggestive of hypoglycemia when blood glucose levels are lowered to the mid 60's. The first set of symptoms are called adrenergic. Patients may experience any of the following nervousness, sweating, intense hunger, trembling, weakness, palpitations, and difficulty speaking.   The acute management of hypoglycemia involves the rapid delivery of a source of easily absorbed sugar. Regular soda, juice,  lifesavers, table sugar, are good options. 15 grams of glucose is the dose that is given, followed by an assessment of symptoms and a blood glucose check if possible. If after 10 minutes there is no improvement, another 10-15 grams should be given. This can be repeated up to three times. The equivalency of 10-15 grams of glucose (approximate servings) are: 3-5 hard candies, 3 teaspoons of sugar, or 1/2 cup of regular soda or juice.      2. Hypertension - Variable.      3. Hyperlipidemia - On statin therapy.    Labs ordered today:   No orders of the defined types were placed in this encounter.      Radiology/Consults ordered today: None    All questions were answered.  The patient indicates understanding of the above issues and agrees with the plan set forth.  Total face to face time greater than or equal to 25 minutes.       Follow-up:  With diabetes ed 7/26  With me 8/29    Diana Butler NP  Endocrinology  [facility]  CC: Lisa Pierre

## 2017-08-01 DIAGNOSIS — I10 ESSENTIAL HYPERTENSION WITH GOAL BLOOD PRESSURE LESS THAN 140/90: ICD-10-CM

## 2017-08-01 NOTE — TELEPHONE ENCOUNTER
LOSARTAN TABS 50MG        Last Written Prescription Date: 05/03/17  Last Fill Quantity: 90, # refills: 0  Last Office Visit with [facility], P or  Health prescribing provider: 06/22/17 Lisa Pierre  Next 5 appointments (look out 90 days)     Aug 29, 2017 10:00 AM CDT   [facility] BRIDGET [facility] CNP   [facility]    [address] [facility] [id number] [phone]                   Potassium   Date Value Ref Range Status   05/17/2017 4.6 3.4 - 5.3 mmol/L Final     Creatinine   Date Value Ref Range Status   05/17/2017 1.02 0.66 - 1.25 mg/dL Final     BP Readings from Last 3 Encounters:   06/29/17 130/72   06/27/17 130/80   06/26/17 124/79

## 2017-08-02 ENCOUNTER — MYC MEDICAL ADVICE (OUTPATIENT)
Dept: FAMILY MEDICINE | Facility: CLINIC | Age: 57
End: 2017-08-02

## 2017-08-02 RX ORDER — LOSARTAN POTASSIUM 50 MG/1
TABLET ORAL
Qty: 90 TABLET | Refills: 2 | Status: SHIPPED | OUTPATIENT
Start: 2017-08-02 | End: 2018-11-19

## 2017-09-07 ENCOUNTER — OFFICE VISIT (OUTPATIENT)
Dept: ENDOCRINOLOGY | Facility: CLINIC | Age: 57
End: 2017-09-07
Payer: COMMERCIAL

## 2017-09-07 ENCOUNTER — TELEPHONE (OUTPATIENT)
Dept: ENDOCRINOLOGY | Facility: CLINIC | Age: 57
End: 2017-09-07

## 2017-09-07 VITALS
WEIGHT: 237.6 LBS | BODY MASS INDEX: 39.54 KG/M2 | SYSTOLIC BLOOD PRESSURE: 155 MMHG | RESPIRATION RATE: 16 BRPM | OXYGEN SATURATION: 99 % | TEMPERATURE: 97.4 F | HEART RATE: 82 BPM | DIASTOLIC BLOOD PRESSURE: 85 MMHG

## 2017-09-07 DIAGNOSIS — E11.40 TYPE 2 DIABETES MELLITUS WITH DIABETIC NEUROPATHY, UNSPECIFIED LONG TERM INSULIN USE STATUS: Primary | ICD-10-CM

## 2017-09-07 DIAGNOSIS — Z23 NEED FOR PROPHYLACTIC VACCINATION AND INOCULATION AGAINST INFLUENZA: ICD-10-CM

## 2017-09-07 LAB — HBA1C MFR BLD: 8.5 % (ref 4.3–6)

## 2017-09-07 PROCEDURE — 90686 IIV4 VACC NO PRSV 0.5 ML IM: CPT | Performed by: CLINICAL NURSE SPECIALIST

## 2017-09-07 PROCEDURE — 36415 COLL VENOUS BLD VENIPUNCTURE: CPT | Performed by: CLINICAL NURSE SPECIALIST

## 2017-09-07 PROCEDURE — G0008 ADMIN INFLUENZA VIRUS VAC: HCPCS | Performed by: CLINICAL NURSE SPECIALIST

## 2017-09-07 PROCEDURE — 99214 OFFICE O/P EST MOD 30 MIN: CPT | Mod: 25 | Performed by: CLINICAL NURSE SPECIALIST

## 2017-09-07 PROCEDURE — 83036 HEMOGLOBIN GLYCOSYLATED A1C: CPT | Performed by: CLINICAL NURSE SPECIALIST

## 2017-09-07 NOTE — PROGRESS NOTES
Caio,  Here's a copy of your recent A1c result for your records.  Keep up the good work!  The ultimate goal for you is an A1c less than 7.0%.  I'll see you in 3 months.  Diana Butler NP  Endocrinology

## 2017-09-07 NOTE — PROGRESS NOTES
Injectable Influenza Immunization Documentation    1.  Is the person to be vaccinated sick today?  No    2. Does the person to be vaccinated have an allergy to eggs or to a component of the vaccine?  No    3. Has the person to be vaccinated today ever had a serious reaction to influenza vaccine in the past?  No    4. Has the person to be vaccinated ever had Guillain-Columbus syndrome?  No     Form completed by Rani Heck M.A.

## 2017-09-07 NOTE — TELEPHONE ENCOUNTER
[facility] calling and states diabetic supplies not covered.  Wondering if can give previous ones that were covered.  If gave new supplies likely got new meter in clinic.  New meter for previous supplies sent.  Daisy Bradley RN

## 2017-09-07 NOTE — MR AVS SNAPSHOT
After Visit Summary   9/7/2017    Steve Morgan    MRN: [id number]           Patient Information     Date Of Birth          1960        Visit Information        Provider Department      9/7/2017 9:30 AM Diana Butler APRN [facility]        Today's Diagnoses     Type 2 diabetes mellitus with diabetic neuropathy, unspecified long term insulin use status (H)    -  1      Care Instructions    Caio,  Your A1c today has improved! Keep up the good work.    Component      Latest Ref Rng & Units 5/19/2016 11/15/2016 12/22/2016 5/17/2017   Hemoglobin A1C      4.3 - 6.0 % 7.5 (H) 8.8 (H) 7.7 (H) 10.0 (H)     Component      Latest Ref Rng & Units 9/7/2017   Hemoglobin A1C      4.3 - 6.0 % 8.5 (H)     Increase Lantus to 15 units once daily.    Try to test your blood sugar 2-3 times per day, especially the week before your appointment with [facility] diabetes ed.    Follow up with me in 3 months.          Follow-ups after your visit        Additional Services     DIABETES EDUCATOR REFERRAL       DIABETES SELF MANAGEMENT TRAINING (DSMT)      Your provider has referred you to Diabetes Education: [facility]: Diabetes Education - All [facility] [phone]   [email or web address]    Type of training and number of hours: Previous Diagnosis: Follow-up DSMT - 2 hours.    Medicare covers: 10 hours of initial DSMT in 12 month period from the time of first visit, plus 2 hours of follow-up DSMT annually, and additional hours as requested for insulin training.    Diabetes Type: Type 2 - On Insulin             Diabetes Co-Morbidities: none               A1C Goal:  <7.0       A1C is: Lab Results       Component                Value               Date                       A1C                      8.5                 09/07/2017              If an urgent visit is needed or A1C is above 12, Care Team to call the Diabetes Education Team at [phone] or  send an In Basket message to the Diabetes Education Pool (P DIAB ED-PATIENT CARE).    Diabetes Education Topics: Comprehensive Knowledge Assessment and Instruction    Special Educational Needs Requiring Individual DSMT: None       MEDICAL NUTRITION THERAPY (MNT) for Diabetes    Medical Nutrition Therapy with a Registered Dietitian can be provided in coordination with Diabetes Self-Management Training to assist in achieving optimal diabetes management.    MNT Type and Hours: Previous diagnosis: Annual follow-up MNT - 2 hours                       Medicare will cover: 3 hours initial MNT in 12 month period after first visit, plus 2 hours of follow-up MNT annually    Please be aware that coverage of these services is subject to the terms and limitations of your health insurance plan.  Call member services at your health plan to determine Diabetes Self-Management Training benefits and ask which blood glucose monitor brands are covered by your plan.      Please bring the following with you to your appointment:    (1)  List of current medications   (2)  List of Blood Glucose Monitor brands that are covered by your insurance plan  (3)  Blood Glucose Monitor and log book  (4)   Food records for the 3 days prior to your visit    The Certified Diabetes Educator may make diabetes medication adjustments per the CDE Protocol and Collaborative Practice Agreement.                  Who to contact     If you have questions or need follow up information about today's clinic visit or your schedule please contact [facility] directly at [phone].  Normal or non-critical lab and imaging results will be communicated to you by MyChart, letter or phone within 4 business days after the clinic has received the results. If you do not hear from us within 7 days, please contact the clinic through MyChart or phone. If you have a critical or abnormal lab result, we will notify you by phone as soon as possible.  Submit refill  requests through [employer] or call your pharmacy and they will forward the refill request to us. Please allow 3 business days for your refill to be completed.          Additional Information About Your Visit        [employer] Information     [employer] gives you secure access to your electronic health record. If you see a primary care provider, you can also send messages to your care team and make appointments. If you have questions, please call your primary care clinic.  If you do not have a primary care provider, please call [phone] and they will assist you.        Care EveryWhere ID     This is your Care EveryWhere ID. This could be used by other organizations to access your [facility] medical records  [id number]        Your Vitals Were     Pulse Temperature Respirations Pulse Oximetry BMI (Body Mass Index)       82 97.4  F (36.3  C) (Oral) 16 99% 39.54 kg/m2        Blood Pressure from Last 3 Encounters:   09/07/17 155/85   06/29/17 130/72   06/27/17 130/80    Weight from Last 3 Encounters:   09/07/17 237 lb 9.6 oz (107.8 kg)   06/29/17 238 lb (108 kg)   06/27/17 236 lb (107 kg)              We Performed the Following     DIABETES EDUCATOR REFERRAL     Hemoglobin A1c          Today's Medication Changes          These changes are accurate as of: 9/7/17 10:02 AM.  If you have any questions, ask your nurse or doctor.               These medicines have changed or have updated prescriptions.        Dose/Directions    * blood glucose monitoring lancets   This may have changed:  Another medication with the same name was changed. Make sure you understand how and when to take each.   Used for:  Type 2 diabetes, HbA1C goal < 8% (H)        Use to test blood sugars 2 times daily or as directed.   Quantity:  1 Box   Refills:  prn       * blood glucose monitoring lancets   This may have changed:    - how much to take  - how to take this  - when to take this   Used for:  Type 2 diabetes mellitus with diabetic neuropathy,  unspecified long term insulin use status (H)   Changed by:  Diana Butler APRN CNP        Use to test blood sugar 3 times daily or as directed.   Quantity:  100 each   Refills:  11       * blood glucose monitoring test strip   Commonly known as:  ONE TOUCH VERIO IQ   This may have changed:  Another medication with the same name was added. Make sure you understand how and when to take each.   Used for:  Type 2 diabetes, HbA1C goal < 8% (H)        Use to test blood sugars 2 times daily or as directed.   Quantity:  100 strip   Refills:  0       * blood glucose monitoring test strip   Commonly known as:  KERLINE CONTOUR NEXT   This may have changed:  You were already taking a medication with the same name, and this prescription was added. Make sure you understand how and when to take each.   Used for:  Type 2 diabetes mellitus with diabetic neuropathy, unspecified long term insulin use status (H)   Changed by:  Diana Butler APRN CNP        Use to test blood sugar 3 times daily or as directed.   Quantity:  100 strip   Refills:  11       insulin glargine 100 UNIT/ML injection   Commonly known as:  LANTUS SOLOSTAR   This may have changed:  additional instructions   Used for:  Type 2 diabetes mellitus with diabetic neuropathy, unspecified long term insulin use status (H)   Changed by:  Diana Butler APRN CNP        Inject 15 units sq q hs   Quantity:  15 mL   Refills:  3       * Notice:  This list has 4 medication(s) that are the same as other medications prescribed for you. Read the directions carefully, and ask your doctor or other care provider to review them with you.         Where to get your medicines      These medications were sent to [facility]/pharmacy #[id number] - [place], [place] - [address]  [address] [facility] [address]     Phone:  [phone]     blood glucose monitoring lancets    blood glucose monitoring test strip    insulin glargine 100 UNIT/ML injection    insulin pen needle 31G X 5 MM                 Primary Care Provider Office Phone # Fax #    Lisa Sue Pierre PA-C [phone] [address] [facility] [address]        Equal Access to Services     [place] : Ros zheng martinez Somayeali, waaxda luqadaha, qaybta kaalmada adeariane, jayson sanonshirley schwartz. So [facility] [phone].    ATENCIÓN: Si habla español, tiene a adler disposición servicios gratuitos de asistencia lingüística. Llame al [phone].    We comply with applicable federal civil rights laws and [place] laws. We do not discriminate on the basis of race, color, national origin, age, disability sex, sexual orientation or gender identity.            Thank you!     Thank you for choosing [facility]  for your care. Our goal is always to provide you with excellent care. Hearing back from our patients is one way we can continue to improve our services. Please take a few minutes to complete the written survey that you may receive in the mail after your visit with us. Thank you!             Your Updated Medication List - Protect others around you: Learn how to safely use, store and throw away your medicines at www.disposemymeds.org.          This list is accurate as of: 9/7/17 10:02 AM.  Always use your most recent med list.                   Brand Name Dispense Instructions for use Diagnosis    ammonium lactate 12 % cream    LAC-HYDRIN    385 g    Apply topically 2 times daily as needed for dry skin    Diabetic polyneuropathy associated with type 2 diabetes mellitus (H), Pre-ulcerative calluses, Pes planus of both feet       aspirin 81 MG tablet     100    ONE DAILY        atenolol 25 MG tablet    TENORMIN    90 tablet    Take 1 tablet (25 mg) by mouth every morning    Essential hypertension with goal blood pressure less than 140/90       * blood glucose monitoring lancets     1 Box    Use to test blood sugars 2 times daily or as directed.    Type 2 diabetes, HbA1C goal  < 8% (H)       * blood glucose monitoring lancets     100 each    Use to test blood sugar 3 times daily or as directed.    Type 2 diabetes mellitus with diabetic neuropathy, unspecified long term insulin use status (H)       * blood glucose monitoring test strip    ONE TOUCH VERIO IQ    100 strip    Use to test blood sugars 2 times daily or as directed.    Type 2 diabetes, HbA1C goal < 8% (H)       * blood glucose monitoring test strip    KERLINE CONTOUR NEXT    100 strip    Use to test blood sugar 3 times daily or as directed.    Type 2 diabetes mellitus with diabetic neuropathy, unspecified long term insulin use status (H)       buPROPion 300 MG 24 hr tablet    WELLBUTRIN XL    90 tablet    Take 1 tablet (300 mg) by mouth every morning    Excessive anger       CALCIUM 600 + D PO      Take 1 tablet by mouth daily        citalopram 20 MG tablet    celeXA    90 tablet    Take 1 tablet (20 mg) by mouth daily    Excessive anger       ferrous sulfate 325 (65 FE) MG tablet    IRON     Take 1 tablet by mouth daily (with breakfast).        glipiZIDE 10 MG 24 hr tablet    GLUCOTROL XL    180 tablet    Take 2 tablets (20 mg) by mouth every morning    Type 2 diabetes mellitus with diabetic neuropathy, without long-term current use of insulin (H)       hydrocortisone 0.2 % cream    WESTCORT    45 g    Apply topically 2 times daily as needed Apply sparingly to affected area, BID.    Dermatitis       insulin glargine 100 UNIT/ML injection    LANTUS SOLOSTAR    15 mL    Inject 15 units sq q hs    Type 2 diabetes mellitus with diabetic neuropathy, unspecified long term insulin use status (H)       insulin pen needle 31G X 5 MM    B-D U/F    100 each    Use 1 daily or as directed.    Type 2 diabetes mellitus with diabetic neuropathy, unspecified long term insulin use status (H)       levothyroxine 200 MCG tablet    SYNTHROID/LEVOTHROID    90 tablet    Take 1 tablet (200 mcg) by mouth daily    Hypothyroidism, unspecified type        losartan 50 MG tablet    COZAAR    90 tablet    TAKE 1 TABLET DAILY    Essential hypertension with goal blood pressure less than 140/90       metFORMIN 1000 MG tablet    GLUCOPHAGE    180 tablet    Take 1 tablet (1,000 mg) by mouth 2 times daily (with meals)    Type 2 diabetes mellitus with diabetic neuropathy, without long-term current use of insulin (H)       Multi-vitamin Tabs tablet   Generic drug:  multivitamin, therapeutic with minerals     30    1 TABLET DAILY        omeprazole-sodium bicarbonate  MG per capsule    ZEGERID     Take 1 capsule by mouth every morning (before breakfast)        * order for DME     1 Units    Equipment being ordered: single cane with rubber foot    Type 2 diabetes, HbA1C goal < 8% (H), Neuropathy in diabetes (H), Unsteady gait       * order for DME     1 Units    Equipment being ordered: four pronged cane    Unsteady gait, Neuropathy in diabetes (H), Type 2 diabetes, HbA1C goal < 8% (H)       simvastatin 20 MG tablet    ZOCOR    90 tablet    Take 1 tablet (20 mg) by mouth At Bedtime    Hyperlipidemia LDL goal <100       VITAMIN B 12 PO      Take 250 mcg by mouth daily    Excessive anger       VITAMIN D (CHOLECALCIFEROL) PO      Take 1,000 Units by mouth daily.        * Notice:  This list has 6 medication(s) that are the same as other medications prescribed for you. Read the directions carefully, and ask your doctor or other care provider to review them with you.

## 2017-09-07 NOTE — PATIENT INSTRUCTIONS
Caio,  Your A1c today has improved! Keep up the good work.    Component      Latest Ref Rng & Units 5/19/2016 11/15/2016 12/22/2016 5/17/2017   Hemoglobin A1C      4.3 - 6.0 % 7.5 (H) 8.8 (H) 7.7 (H) 10.0 (H)     Component      Latest Ref Rng & Units 9/7/2017   Hemoglobin A1C      4.3 - 6.0 % 8.5 (H)     Increase Lantus to 15 units once daily.    Try to test your blood sugar 2-3 times per day, especially the week before your appointment with [facility], diabetes ed.    Follow up with me in 3 months.

## 2017-09-07 NOTE — PROGRESS NOTES
"Name: Steve \"Caio\" Cathy  Seen at the request of Lisa Pierre for Diabetes (Last seen 6/29/2017).  HPI:  Steve Morgan is a 57 year old male who presents for the evaluation/management of:    1. Type 2 DM:  Orginally diagnosed at the age of 33 or 38.  Was prediabetic prior, was not particularly symptomatic at the time, was noted on routine lab work    Current Regimen: Metformin 1000 mg bid, glipzide 20 mg q am, Lantus 12 units qd (started 5/2017)  BS checks: Infrequent - not testing since July.    Average Meter Download: Unable to download meter due to brand, review of meter memory: last tested in July.  avg , BG range 100-340.  Was testing an average of 1x/day.  Most readings 200-300's (124, 248, 255, 215, 338, 204, 340, 310, 193, 217)    Elevated blood sugar due to continued dietary indiscretions, states he hasn't been watching his diet as carefully.  Likes to snack between meals, buys snacks at the gas station.    History of  lower right abdominal infection/ abcess June 2017.    Complications:   Diabetes Complications  Description / Detail    Diabetic Retinopathy  No retinopathy, early cataract, last exam about 1 year ago -has new insurance, waiting to see which providers are covered.   CAD / PAD  No   Neuropathy  Yes: numbness hands and feet   Nephropathy / Microalbuminuria  No   Gastroparesis  No   Hypoglycemia Unawarness  Not sure, gets 'kind of dizzy' when blood sugar is low but reports history of low blood sugars without symptoms     2. Hypertension: Blood Pressure today:   BP Readings from Last 3 Encounters:   09/07/17 155/85   06/29/17 130/72   06/27/17 130/80   .  Blood pressure medications include atenolol 25 mg qd and losartan 50 mg qd  .  Takes medications everyday without forgetting a dose.  Denies feeling lightheaded or dizzy.   3. Hyperlipidemia: Takes simvastatin 20 mg for lipid control.  Denies muscle aches of pains.       4. Prevention:  Flu Shot- 11/2016  Pneumovax- " 2012  Opthalmology-Yes: due 6/2017  Dental-Yes: twice a year  ASA-Yes, 81 mg qd   Smoking- No  5.  Hypothyroidism. Currently treated with levothyroxine 200 mcg daily.  Takes the levothyroxine first thing in the morning and waits 30+ minutes before eating breakfast.    PMH/PSH:  Past Medical History:   Diagnosis Date     Cellulitis and abscess of trunk 6/27/2017     Depression      Hyperlipidemia LDL goal <100 10/31/2010     Hypertension goal BP (blood pressure) < 140/90 3/17/2011     Hypothyroidism 1/12/2010     Morbid obesity due to excess calories (H) 1/12/2010     Neuropathy in diabetes (H) 1/12/2010     Obesity 1/12/2010     Sleep apnea 1/12/2010    CPAP     Type 2 diabetes, HbA1C goal < 8% (H) 3/8/2011     Past Surgical History:   Procedure Laterality Date     COLONOSCOPY       GENITOURINARY SURGERY      surg for undescended testicle     REPAIR HAMMER TOE BILATERAL  5/16/2013    Procedure: REPAIR HAMMER TOE BILATERAL;  Flexor Tenotomy Toes 2,3,4,5 Bilateral Feet;  Surgeon: Saad Bangura DPM;  Location: [facility] OR     Family Hx:  Family History   Problem Relation Age of Onset     Breast Cancer Mother      Hypertension Mother      Thyroid Disease Mother      Depression Mother      Cancer - colorectal Father      Thyroid Disease Father      Depression Father      HEART DISEASE Maternal Grandmother      CHF     Circulatory Paternal Grandmother      CANCER Paternal Grandfather      DIABETES Brother      DIABETES Brother      Thyroid disease: Yes: mother         DM2: Yes: one brother with type 1 diabetes and one brother with type 2 diabetes, paternal aunt with DM2         Autoimmune: DM1, SLE, RA, Vitiligo Yes: brother with DM1    Social Hx:  Social History     Social History     Marital status:      Spouse name: Sri     Number of children: 2     Years of education: N/A     Occupational History      None      [employer]     Social History Main Topics     Smoking status: Never Smoker     Smokeless  tobacco: Never Used     Alcohol use Yes      Comment: occ     Drug use: No     Sexual activity: Yes     Partners: Female     Other Topics Concern     Parent/Sibling W/ Cabg, Mi Or Angioplasty Before 65f 55m? No     Social History Narrative          MEDICATIONS:  has a current medication list which includes the following prescription(s): blood glucose monitoring, blood glucose monitoring, insulin glargine, insulin pen needle, losartan, ammonium lactate, bupropion, citalopram, simvastatin, atenolol, levothyroxine, hydrocortisone, glipizide, metformin, omeprazole-sodium bicarbonate, blood glucose monitoring, blood glucose monitoring, cyanocobalamin, calcium carb-cholecalciferol, order for dme, order for dme, cholecalciferol, ferrous sulfate, aspirin, and multi-vitamin, and the following Facility-Administered Medications: cefazolin.    ROS     ROS: 10 point ROS neg other than the symptoms noted above in the HPI.    Physical Exam   VS: /85 (BP Location: Left arm, Patient Position: Chair, Cuff Size: Adult Large)  Pulse 82  Temp 97.4  F (36.3  C) (Oral)  Resp 16  Wt 237 lb 9.6 oz (107.8 kg)  SpO2 99%  BMI 39.54 kg/m2  GENERAL: AXOX3, NAD, well dressed, answering questions appropriately, appears stated age.  HEENT: OP clear, no LAD, no TM, non-tender, no exopthalmous, no proptosis, EOMI, no lig lag, no retraction  CV: RRR, no rubs, gallops, no murmurs  LUNGS: CTAB, no wheezes, rales, or ronchi  ABDOMEN: soft, nontender, nondistended, +BS, no organomegaly  EXTREMITIES: no edema, +pulses, no rashes, no lesions  NEUROLOGY: CN grossly intact, + monofilament, + DTR upper and lower extremity, no tremors  MSK: grossly intact  SKIN: no rashes, no lesions    LABS:  A1c:   Component      Latest Ref Rng & Units 12/22/2016 5/17/2017 9/7/2017   Hemoglobin A1C      4.3 - 6.0 % 7.7 (H) 10.0 (H) 8.5 (H)     Basic Metabolic Panel:  !COMPREHENSIVE Latest Ref Rng & Units 5/19/2016 5/17/2017   SODIUM 133 - 144 mmol/L 129 (L) 131  (L)   POTASSIUM 3.4 - 5.3 mmol/L 4.8 4.6   CHLORIDE 94 - 109 mmol/L 95 98   BUN 7 - 30 mg/dL 17 10   Creatinine 0.66 - 1.25 mg/dL 1.17 1.02   Glucose 70 - 99 mg/dL 176 (H) 368 (H)   ANION GAP 3 - 14 mmol/L 7 9   CALCIUM 8.5 - 10.1 mg/dL 9.6 9.2     LFTS/Cholesterol Panel:  !LIPID/HEPATIC Latest Ref Rng & Units 5/19/2016 5/17/2017   CHOLESTEROL <200 mg/dL 156 192   TRIGLYCERIDES <150 mg/dL 219 (H) 179 (H)   HDL CHOLESTEROL >39 mg/dL 34 (L) 30 (L)   LDL CHOLESTEROL, CALCULATED <100 mg/dL 78 126 (H)   VLDL-CHOLESTEROL 0 - 30 mg/dL     NON HDL CHOLESTEROL <130 mg/dL 122 162 (H)   CHOLESTEROL/HDL RATIO 0.0 - 5.0     AST 0 - 45 U/L 19 22   ALT 0 - 70 U/L 29 35     Thyroid Function:   !THYROID Latest Ref Rng & Units 12/22/2016 11/15/2016 5/19/2016   TSH 0.40 - 4.00 mU/L 0.48 11.71 (H) 5.15 (H)   T4 FREE 0.76 - 1.46 ng/dL  1.06 0.94     !THYROID Latest Ref Rng & Units 8/26/2015 6/26/2015 6/26/2014   TSH 0.40 - 4.00 mU/L 6.21 (H) 7.54 (H) 3.69   T4 FREE 0.76 - 1.46 ng/dL 1.04 1.06      Urine MicroAlbumin:  Component    Latest Ref Rng & Units 11/15/2016   Creatinine Urine    mg/dL 17   Albumin Urine mg/L    mg/L <5   Albumin Urine mg/g Cr    0 - 17 mg/g Cr Unable to calc     Vitamin D:    All pertinent notes, labs, and images personally reviewed by me.     A/P  Mr.Walter Morgan is a 57 year old here for the evaluation/management of diabetes:    1. DM2 - Uncontrolled.  Increase Lantus to 15 units qd.  Encouraged to test blood sugar regularly 2-3 x daily, before breakfast and at bedtime, + some mid-day and 2-hour postprandial testing. Follow up with diabetes ed.  There is some variability among people, most will usually develop symptoms suggestive of hypoglycemia when blood glucose levels are lowered to the mid 60's. The first set of symptoms are called adrenergic. Patients may experience any of the following nervousness, sweating, intense hunger, trembling, weakness, palpitations, and difficulty speaking.   The acute  management of hypoglycemia involves the rapid delivery of a source of easily absorbed sugar. Regular soda, juice, lifesavers, table sugar, are good options. 15 grams of glucose is the dose that is given, followed by an assessment of symptoms and a blood glucose check if possible. If after 10 minutes there is no improvement, another 10-15 grams should be given. This can be repeated up to three times. The equivalency of 10-15 grams of glucose (approximate servings) are: 3-5 hard candies, 3 teaspoons of sugar, or 1/2 cup of regular soda or juice.      2. Hypertension - Variable.      3. Hyperlipidemia - On statin therapy.    Labs ordered today:   Orders Placed This Encounter   Procedures     Hemoglobin A1c     DIABETES EDUCATOR REFERRAL       Radiology/Consults ordered today: DIABETES EDUCATOR REFERRAL    All questions were answered.  The patient indicates understanding of the above issues and agrees with the plan set forth.  Total face to face time greater than or equal to 25 minutes.       Follow-up:  3 months    Diana Butler NP  Endocrinology  [facility]  CC: Lisa Pierre

## 2017-09-11 ENCOUNTER — MYC MEDICAL ADVICE (OUTPATIENT)
Dept: ENDOCRINOLOGY | Facility: CLINIC | Age: 57
End: 2017-09-11

## 2017-09-11 DIAGNOSIS — Z79.4 TYPE 2 DIABETES MELLITUS WITH DIABETIC NEUROPATHY, WITH LONG-TERM CURRENT USE OF INSULIN (H): Primary | ICD-10-CM

## 2017-09-11 DIAGNOSIS — E11.40 TYPE 2 DIABETES MELLITUS WITH DIABETIC NEUROPATHY, WITH LONG-TERM CURRENT USE OF INSULIN (H): Primary | ICD-10-CM

## 2017-09-12 NOTE — TELEPHONE ENCOUNTER
Lantus no longer covered,  What do you want to use instead?  Mychart or call [phone]    Route to provider to review and advise    Zach RN Nurse Triage

## 2017-09-13 RX ORDER — INSULIN GLARGINE 100 [IU]/ML
15 INJECTION, SOLUTION SUBCUTANEOUS DAILY
Qty: 15 ML | Refills: 3 | Status: SHIPPED | OUTPATIENT
Start: 2017-09-13 | End: 2017-12-08

## 2017-09-29 ENCOUNTER — TELEPHONE (OUTPATIENT)
Dept: ENDOCRINOLOGY | Facility: CLINIC | Age: 57
End: 2017-09-29

## 2017-09-29 DIAGNOSIS — E11.40 TYPE 2 DIABETES MELLITUS WITH DIABETIC NEUROPATHY, UNSPECIFIED LONG TERM INSULIN USE STATUS: ICD-10-CM

## 2017-09-29 NOTE — TELEPHONE ENCOUNTER
Patient called earlier that Basaglar not covered.  Advised to call for covered options and let us know what is covered and we can send.  Now getting call from someone at [facility] and they are not understanding and wanting to know comparable medications.  Advised Lantus denied due to coverage.  Advised Lantus and Levemir are similar.  Now stating Lantus Solostar covered.  Has Basaglar now.  Will finish this message Monday.  Daisy Bradley RN

## 2017-10-02 NOTE — TELEPHONE ENCOUNTER
This is very confusing since we just changed Lantus to Basaglar 9/13/17 as he was told Lantus not covered.  Sent Lantus again as insurance rep told me Lantus was the covered option.  Daisy Bradley RN      September 13, 2017   Diana Butler APRN CNP   to Steve Morgan           11:07 AM   Sammy Kearney,   I sent a new prescription to your pharmacy for Basaglar which is the same ingredient as Lantus, just a different brand name.   This should be a lot less expensive for you.   Let me know if there's any further problems.   Diana Butler, GEORGI   Endocrinology      Last read by Steve Morgan at 4:40 PM on 9/13/2017.    September 12, 2017            4:24 PM   Laura Pizarro, RN routed this conversation to Diana Butler APRN CNP Cole, Kathryn, RN        4:17 PM   Note      Lantus no longer covered,  What do you want to use instead?  Mychart or call [phone]     Route to provider to review and advise     Zach YEPEZ Nurse Triage            September 11, 2017   Steve Morgan   to BRIDGET Payne CNP           10:51 AM   Dr. Butler,     When I went to pickup up my Lantau pens at [facility] I found out that it would be $400. The pharmacist said she was going to send a note and ask if we could use a different type with the same active ingredien.  She told me the note was sent I wanted to remind you it was there because I only have about one half of a pen lef.  Could you please check on it and let me know?     THank You

## 2017-12-08 ENCOUNTER — TELEPHONE (OUTPATIENT)
Dept: FAMILY MEDICINE | Facility: CLINIC | Age: 57
End: 2017-12-08

## 2017-12-08 ENCOUNTER — TELEPHONE (OUTPATIENT)
Dept: ENDOCRINOLOGY | Facility: CLINIC | Age: 57
End: 2017-12-08

## 2017-12-08 ENCOUNTER — OFFICE VISIT (OUTPATIENT)
Dept: ENDOCRINOLOGY | Facility: CLINIC | Age: 57
End: 2017-12-08
Payer: COMMERCIAL

## 2017-12-08 VITALS
WEIGHT: 235.1 LBS | SYSTOLIC BLOOD PRESSURE: 138 MMHG | BODY MASS INDEX: 39.12 KG/M2 | RESPIRATION RATE: 16 BRPM | DIASTOLIC BLOOD PRESSURE: 70 MMHG | HEART RATE: 90 BPM | TEMPERATURE: 98 F | OXYGEN SATURATION: 98 %

## 2017-12-08 DIAGNOSIS — L30.9 DERMATITIS: ICD-10-CM

## 2017-12-08 DIAGNOSIS — E11.40 TYPE 2 DIABETES MELLITUS WITH DIABETIC NEUROPATHY, UNSPECIFIED LONG TERM INSULIN USE STATUS: ICD-10-CM

## 2017-12-08 DIAGNOSIS — E11.40 TYPE 2 DIABETES MELLITUS WITH DIABETIC NEUROPATHY, WITH LONG-TERM CURRENT USE OF INSULIN (H): Primary | ICD-10-CM

## 2017-12-08 DIAGNOSIS — Z79.4 TYPE 2 DIABETES MELLITUS WITH DIABETIC NEUROPATHY, WITH LONG-TERM CURRENT USE OF INSULIN (H): Primary | ICD-10-CM

## 2017-12-08 DIAGNOSIS — E11.40 TYPE 2 DIABETES MELLITUS WITH DIABETIC NEUROPATHY, WITHOUT LONG-TERM CURRENT USE OF INSULIN (H): ICD-10-CM

## 2017-12-08 DIAGNOSIS — E03.4 HYPOTHYROIDISM DUE TO ACQUIRED ATROPHY OF THYROID: ICD-10-CM

## 2017-12-08 LAB — HBA1C MFR BLD: 9 % (ref 4.3–6)

## 2017-12-08 PROCEDURE — 99214 OFFICE O/P EST MOD 30 MIN: CPT | Performed by: CLINICAL NURSE SPECIALIST

## 2017-12-08 PROCEDURE — 36415 COLL VENOUS BLD VENIPUNCTURE: CPT | Performed by: CLINICAL NURSE SPECIALIST

## 2017-12-08 PROCEDURE — 84443 ASSAY THYROID STIM HORMONE: CPT | Performed by: CLINICAL NURSE SPECIALIST

## 2017-12-08 PROCEDURE — 84439 ASSAY OF FREE THYROXINE: CPT | Performed by: CLINICAL NURSE SPECIALIST

## 2017-12-08 PROCEDURE — 83036 HEMOGLOBIN GLYCOSYLATED A1C: CPT | Performed by: CLINICAL NURSE SPECIALIST

## 2017-12-08 PROCEDURE — 82043 UR ALBUMIN QUANTITATIVE: CPT | Performed by: CLINICAL NURSE SPECIALIST

## 2017-12-08 RX ORDER — INSULIN GLARGINE 100 [IU]/ML
15 INJECTION, SOLUTION SUBCUTANEOUS DAILY
Qty: 15 ML | Refills: 3 | Status: SHIPPED | OUTPATIENT
Start: 2017-12-08 | End: 2017-12-08

## 2017-12-08 RX ORDER — HYDROCORTISONE VALERATE CREAM 2 MG/G
CREAM TOPICAL 2 TIMES DAILY PRN
Qty: 45 G | Refills: 1 | Status: SHIPPED | OUTPATIENT
Start: 2017-12-08 | End: 2018-09-07

## 2017-12-08 RX ORDER — GLIPIZIDE 10 MG/1
20 TABLET, FILM COATED, EXTENDED RELEASE ORAL EVERY MORNING
Qty: 180 TABLET | Refills: 1 | Status: SHIPPED | OUTPATIENT
Start: 2017-12-08 | End: 2019-08-29

## 2017-12-08 NOTE — TELEPHONE ENCOUNTER
[facility] calling and Basaglar sent and not covered.  Was changed to Lantus due to coverage in October.  They have current RX and will fill.  Confirmed with Diana not any reason went back to Basaglar.  Diana just says he said he was taking Basaglar.  Diana also advised went to 18 units.  Updated on med list.  Daisy Bradley RN

## 2017-12-08 NOTE — PROGRESS NOTES
"Name: Steve \"Caio\" Cathy  Seen at the request of Lisa Pierre for Diabetes (Last seen 9/17/2017).  HPI:  Steve Morgan is a 57 year old male who presents for the evaluation/management of:    1. Type 2 DM:  Orginally diagnosed at the age of 33 or 38.  Was prediabetic prior, was not particularly symptomatic at the time, was noted on routine lab work    Current Regimen: Metformin 1000 mg bid, glipzide 20 mg q am, ? Basaglar 15 units qd (started 5/2017) - confusion over whether he is using Lantus or Basaglar but he thinks he is using basaglar.    BS checks: Infrequent - not testing since July.    Average Meter Download: did not bring meter    Elevated blood sugar due to continued dietary indiscretions, states he hasn't been watching his diet as carefully.  Likes to snack between meals, buys snacks at the gas station.    History of  lower right abdominal infection/ abcess June 2017.    Complications:   Diabetes Complications  Description / Detail    Diabetic Retinopathy  No retinopathy, early cataract, last exam 9/2016   CAD / PAD  No   Neuropathy  Yes: numbness hands and feet   Nephropathy / Microalbuminuria  No   Gastroparesis  No   Hypoglycemia [place]arness  Not sure, gets 'kind of dizzy' when blood sugar is low but reports history of low blood sugars without symptoms     2. Hypertension: Blood Pressure today:   BP Readings from Last 3 Encounters:   12/08/17 138/70   09/07/17 155/85   06/29/17 130/72   .  Blood pressure medications include atenolol 25 mg qd and losartan 50 mg qd  .  Takes medications everyday without forgetting a dose.  Denies feeling lightheaded or dizzy.   3. Hyperlipidemia: Takes simvastatin 20 mg for lipid control.  Denies muscle aches of pains.       4. Prevention:  Flu Shot- 11/2016  Pneumovax- 2012  Opthalmology-Yes: due 6/2017  Dental-Yes: twice a year  ASA-Yes, 81 mg qd   Smoking- No  5.  Hypothyroidism. Currently treated with levothyroxine 200 mcg daily.  Takes the levothyroxine " first thing in the morning and waits 30+ minutes before eating breakfast.    PMH/PSH:  Past Medical History:   Diagnosis Date     Cellulitis and abscess of trunk 6/27/2017     Depression      Hyperlipidemia LDL goal <100 10/31/2010     Hypertension goal BP (blood pressure) < 140/90 3/17/2011     Hypothyroidism 1/12/2010     Morbid obesity due to excess calories (H) 1/12/2010     Neuropathy in diabetes (H) 1/12/2010     Obesity 1/12/2010     Sleep apnea 1/12/2010    CPAP     Type 2 diabetes, HbA1C goal < 8% (H) 3/8/2011     Past Surgical History:   Procedure Laterality Date     COLONOSCOPY       GENITOURINARY SURGERY      surg for undescended testicle     REPAIR HAMMER TOE BILATERAL  5/16/2013    Procedure: REPAIR HAMMER TOE BILATERAL;  Flexor Tenotomy Toes 2,3,4,5 Bilateral Feet;  Surgeon: Saad Bangrua DPM;  Location: [facility] OR     Family Hx:  Family History   Problem Relation Age of Onset     Breast Cancer Mother      Hypertension Mother      Thyroid Disease Mother      Depression Mother      Cancer - colorectal Father      Thyroid Disease Father      Depression Father      HEART DISEASE Maternal Grandmother      CHF     Circulatory Paternal Grandmother      CANCER Paternal Grandfather      DIABETES Brother      DIABETES Brother      Thyroid disease: Yes: mother         DM2: Yes: one brother with type 1 diabetes and one brother with type 2 diabetes, paternal aunt with DM2         Autoimmune: DM1, SLE, RA, Vitiligo Yes: brother with DM1    Social Hx:  Social History     Social History     Marital status:      Spouse name: Sri     Number of children: 2     Years of education: N/A     Occupational History      None      [employer]     Social History Main Topics     Smoking status: Never Smoker     Smokeless tobacco: Never Used     Alcohol use Yes      Comment: occ     Drug use: No     Sexual activity: Yes     Partners: Female     Other Topics Concern     Parent/Sibling W/ Cabg, Mi Or Angioplasty  Before 65f 55m? No     Social History Narrative          MEDICATIONS:  has a current medication list which includes the following prescription(s): insulin glargine, basaglar, blood glucose monitoring, blood glucose monitoring, insulin pen needle, blood glucose monitoring, losartan, ammonium lactate, bupropion, citalopram, simvastatin, atenolol, levothyroxine, hydrocortisone, glipizide, metformin, omeprazole-sodium bicarbonate, blood glucose monitoring, blood glucose monitoring, cyanocobalamin, calcium carb-cholecalciferol, order for dme, order for dme, cholecalciferol, ferrous sulfate, aspirin, and multi-vitamin, and the following Facility-Administered Medications: cefazolin.    ROS     ROS: 10 point ROS neg other than the symptoms noted above in the HPI.    Physical Exam   VS: /70 (BP Location: Left arm, Patient Position: Chair, Cuff Size: Adult Large)  Pulse 90  Temp 98  F (36.7  C) (Oral)  Resp 16  Wt 106.6 kg (235 lb 1.6 oz)  SpO2 98%  BMI 39.12 kg/m2  GENERAL: AXOX3, NAD, well dressed, answering questions appropriately, appears stated age.  HEENT: OP clear, no LAD, no TM, non-tender, no exopthalmous, no proptosis, EOMI, no lig lag, no retraction  CV: RRR, no rubs, gallops, no murmurs  LUNGS: CTAB, no wheezes, rales, or ronchi  ABDOMEN: soft, nontender, nondistended  EXTREMITIES: no edema, feet with 2+ pulses, absent plantar sensation bilaterally, scattered bilateral calluses, trace edema at the ankles  NEUROLOGY: CN grossly intact, + monofilament, + DTR upper and lower extremity, no tremors  MSK: grossly intact  SKIN: no rashes, no lesions    LABS:  A1c:   Component      Latest Ref Rng & Units 5/17/2017 9/7/2017 12/8/2017   Hemoglobin A1C      4.3 - 6.0 % 10.0 (H) 8.5 (H) 9.0 (H)     Basic Metabolic Panel:  !COMPREHENSIVE Latest Ref Rng & Units 5/17/2017   SODIUM 133 - 144 mmol/L 131 (L)   POTASSIUM 3.4 - 5.3 mmol/L 4.6   CHLORIDE 94 - 109 mmol/L 98   BUN 7 - 30 mg/dL 10   Creatinine 0.66 - 1.25  mg/dL 1.02   Glucose 70 - 99 mg/dL 368 (H)   ANION GAP 3 - 14 mmol/L 9   CALCIUM 8.5 - 10.1 mg/dL 9.2   ALBUMIN 3.4 - 5.0 g/dL 3.8     LFTS/Cholesterol Panel:  !LIPID/HEPATIC Latest Ref Rng & Units 5/17/2017   CHOLESTEROL <200 mg/dL 192   TRIGLYCERIDES <150 mg/dL 179 (H)   HDL CHOLESTEROL >39 mg/dL 30 (L)   LDL CHOLESTEROL, CALCULATED <100 mg/dL 126 (H)   VLDL-CHOLESTEROL 0 - 30 mg/dL    NON HDL CHOLESTEROL <130 mg/dL 162 (H)   CHOLESTEROL/HDL RATIO 0.0 - 5.0    AST 0 - 45 U/L 22   ALT 0 - 70 U/L 35     Thyroid Function:   !THYROID Latest Ref Rng & Units 12/22/2016 11/15/2016 5/19/2016   TSH 0.40 - 4.00 mU/L 0.48 11.71 (H) 5.15 (H)   T4 FREE 0.76 - 1.46 ng/dL  1.06 0.94     Urine MicroAlbumin:  Component    Latest Ref Rng & Units 11/15/2016   Creatinine Urine    mg/dL 17   Albumin Urine mg/L    mg/L <5   Albumin Urine mg/g Cr    0 - 17 mg/g Cr Unable to calc     Vitamin D:    All pertinent notes, labs, and images personally reviewed by me.     A/P  Mr.Walter Morgan is a 57 year old here for the evaluation/management of diabetes:    1. DM2 - Uncontrolled.  Increase Basaglar (or Lantus)  to 18 units qd.  Continue to increase as needed per titration scale provided to max dose of 45 units qd.   Encouraged to test blood sugar regularly 2-3 x daily, before breakfast and at bedtime, + some mid-day and 2-hour postprandial testing. Follow up with diabetes ed.  There is some variability among people, most will usually develop symptoms suggestive of hypoglycemia when blood glucose levels are lowered to the mid 60's. The first set of symptoms are called adrenergic. Patients may experience any of the following nervousness, sweating, intense hunger, trembling, weakness, palpitations, and difficulty speaking.   The acute management of hypoglycemia involves the rapid delivery of a source of easily absorbed sugar. Regular soda, juice, lifesavers, table sugar, are good options. 15 grams of glucose is the dose that is given, followed  by an assessment of symptoms and a blood glucose check if possible. If after 10 minutes there is no improvement, another 10-15 grams should be given. This can be repeated up to three times. The equivalency of 10-15 grams of glucose (approximate servings) are: 3-5 hard candies, 3 teaspoons of sugar, or 1/2 cup of regular soda or juice.      2. Hypertension - Variable.      3. Hyperlipidemia - On statin therapy.    Labs ordered today:   Orders Placed This Encounter   Procedures     Hemoglobin A1c     Albumin Random Urine Quantitative with Creat Ratio       Radiology/Consults ordered today: None    All questions were answered.  The patient indicates understanding of the above issues and agrees with the plan set forth.  Total face to face time greater than or equal to 25 minutes.       Follow-up:  3 months    Diana Butler NP  Endocrinology  [facility]  CC: Lisa Pierre

## 2017-12-08 NOTE — NURSING NOTE
"Chief Complaint   Patient presents with     Diabetes       Initial /70 (BP Location: Left arm, Patient Position: Chair, Cuff Size: Adult Large)  Pulse 90  Temp 98  F (36.7  C) (Oral)  Resp 16  Wt 106.6 kg (235 lb 1.6 oz)  SpO2 98%  BMI 39.12 kg/m2 Estimated body mass index is 39.12 kg/(m^2) as calculated from the following:    Height as of 6/29/17: 1.651 m (5' 5\").    Weight as of this encounter: 106.6 kg (235 lb 1.6 oz).  Medication Reconciliation: complete  "

## 2017-12-08 NOTE — MR AVS SNAPSHOT
After Visit Summary   12/8/2017    Steve Morgan    MRN: [id number]           Patient Information     Date Of Birth          1960        Visit Information        Provider Department      12/8/2017 10:00 AM Diana Butler APRN CNP [facility] [place] [facility]        Today's Diagnoses     Type 2 diabetes mellitus with diabetic neuropathy, with long-term current use of insulin (H)    -  1    Hypothyroidism due to acquired atrophy of thyroid        Type 2 diabetes mellitus with diabetic neuropathy, without long-term current use of insulin (H)        Type 2 diabetes mellitus with diabetic neuropathy, unspecified long term insulin use status (H)          Care Instructions      Your  A1c today is higher.    Increase Basaglar to 18 units once daily  Start testing your blood sugars twice per day - morning and bedtime.  Watch your diet carefully and try to limit carbs at meals to 3, no more that 4 serving at each meal.  Increase the Basaglar by one unit every 3-4 days until your morning blood sugar is under 130.   Do not increase Basaglar to more than 45 units once per day.  You'll need to get in touch with me to let me know how many more units of Basaglar you are taking so I can update your prescription.    A1c:   Component      Latest Ref Rng & Units 12/22/2016 5/17/2017 9/7/2017 12/8/2017   Hemoglobin A1C      4.3 - 6.0 % 7.7 (H) 10.0 (H) 8.5% (H) 9.0 (H)     Follow up in 3 months for a recheck.    Diana Butler NP  Endocrinology              Follow-ups after your visit        Who to contact     If you have questions or need follow up information about today's clinic visit or your schedule please contact [facility] directly at [phone].  Normal or non-critical lab and imaging results will be communicated to you by MyChart, letter or phone within 4 business days after the clinic has received the results. If you do not hear from us within 7 days, please contact the clinic  through AQS or phone. If you have a critical or abnormal lab result, we will notify you by phone as soon as possible.  Submit refill requests through AQS or call your pharmacy and they will forward the refill request to us. Please allow 3 business days for your refill to be completed.          Additional Information About Your Visit        HookLogicharConnoshoer Information     AQS gives you secure access to your electronic health record. If you see a primary care provider, you can also send messages to your care team and make appointments. If you have questions, please call your primary care clinic.  If you do not have a primary care provider, please call [phone] and they will assist you.        Care EveryWhere ID     This is your Care EveryWhere ID. This could be used by other organizations to access your [place] medical records  [id number]        Your Vitals Were     Pulse Temperature Respirations Pulse Oximetry BMI (Body Mass Index)       90 98  F (36.7  C) (Oral) 16 98% 39.12 kg/m2        Blood Pressure from Last 3 Encounters:   12/08/17 138/70   09/07/17 155/85   06/29/17 130/72    Weight from Last 3 Encounters:   12/08/17 106.6 kg (235 lb 1.6 oz)   09/07/17 107.8 kg (237 lb 9.6 oz)   06/29/17 108 kg (238 lb)              We Performed the Following     Albumin Random Urine Quantitative with Creat Ratio     Hemoglobin A1c     TSH with free T4 reflex          Today's Medication Changes          These changes are accurate as of: 12/8/17 10:53 AM.  If you have any questions, ask your nurse or doctor.               These medicines have changed or have updated prescriptions.        Dose/Directions    BASAGLAR 100 UNIT/ML injection   This may have changed:  Another medication with the same name was removed. Continue taking this medication, and follow the directions you see here.   Used for:  Type 2 diabetes mellitus with diabetic neuropathy, with long-term current use of insulin (H)   Changed by:  Diana Btuler  BRIDGET Camargo CNP        Dose:  15 Units   Inject 15 Units Subcutaneous daily   Quantity:  15 mL   Refills:  3         Stop taking these medicines if you haven't already. Please contact your care team if you have questions.     blood glucose monitoring meter device kit   Stopped by:  Diana Butler APRN CNP                Where to get your medicines      These medications were sent to [facility]/pharmacy #[id number] - [place], [place] - [address].  [address] [place] [place] [address]     Phone:  [phone]     BASAGLAR 100 UNIT/ML injection    glipiZIDE 10 MG 24 hr tablet    hydrocortisone 0.2 % cream    insulin pen needle 31G X 5 MM    metFORMIN 1000 MG tablet                Primary Care Provider Office Phone # Fax #    Lisa Pierre PA-C [phone] [address] [facility] [address] [address]        Equal Access to Services     [facility] : Hadii aad marce hadasho Soomaali, waaxda luqadaha, qaybta kaalmada adeegyada, jayson jackson hayshirley gimenez . So [facility] [phone].    ATENCIÓN: Si habla español, tiene a adler disposición servicios gratuitos de asistencia lingüística. Jean al [phone].    We comply with applicable federal civil rights laws and [place] laws. We do not discriminate on the basis of race, color, national origin, age, disability, sex, sexual orientation, or gender identity.            Thank you!     Thank you for choosing [facility]  for your care. Our goal is always to provide you with excellent care. Hearing back from our patients is one way we can continue to improve our services. Please take a few minutes to complete the written survey that you may receive in the mail after your visit with us. Thank you!             Your Updated Medication List - Protect others around you: Learn how to safely use, store and throw away your medicines at www.disposemymeds.org.          This list is accurate as of: 12/8/17 10:53 AM.  Always use your most recent med  list.                   Brand Name Dispense Instructions for use Diagnosis    ammonium lactate 12 % cream    LAC-HYDRIN    385 g    Apply topically 2 times daily as needed for dry skin    Diabetic polyneuropathy associated with type 2 diabetes mellitus (H), Pre-ulcerative calluses, Pes planus of both feet       aspirin 81 MG tablet     100    ONE DAILY        atenolol 25 MG tablet    TENORMIN    90 tablet    Take 1 tablet (25 mg) by mouth every morning    Essential hypertension with goal blood pressure less than 140/90       BASAGLAR 100 UNIT/ML injection     15 mL    Inject 15 Units Subcutaneous daily    Type 2 diabetes mellitus with diabetic neuropathy, with long-term current use of insulin (H)       * blood glucose monitoring lancets     1 Box    Use to test blood sugars 2 times daily or as directed.    Type 2 diabetes, HbA1C goal < 8% (H)       * blood glucose monitoring lancets     100 each    Use to test blood sugar 3 times daily or as directed.    Type 2 diabetes mellitus with diabetic neuropathy, unspecified long term insulin use status (H)       * blood glucose monitoring test strip    ONETOUCH VERIO IQ    100 strip    Use to test blood sugars 2 times daily or as directed.    Type 2 diabetes, HbA1C goal < 8% (H)       * blood glucose monitoring test strip    KERLINE CONTOUR NEXT    100 strip    Use to test blood sugar 3 times daily or as directed.    Type 2 diabetes mellitus with diabetic neuropathy, unspecified long term insulin use status (H)       buPROPion 300 MG 24 hr tablet    WELLBUTRIN XL    90 tablet    Take 1 tablet (300 mg) by mouth every morning    Excessive anger       CALCIUM 600 + D PO      Take 1 tablet by mouth daily        citalopram 20 MG tablet    celeXA    90 tablet    Take 1 tablet (20 mg) by mouth daily    Excessive anger       ferrous sulfate 325 (65 FE) MG tablet    IRON     Take 1 tablet by mouth daily (with breakfast).        glipiZIDE 10 MG 24 hr tablet    GLUCOTROL XL    180  tablet    Take 2 tablets (20 mg) by mouth every morning    Type 2 diabetes mellitus with diabetic neuropathy, without long-term current use of insulin (H)       hydrocortisone 0.2 % cream    WESTCORT    45 g    Apply topically 2 times daily as needed Apply sparingly to affected area, BID.    Dermatitis       insulin pen needle 31G X 5 MM    B-D U/F    100 each    Use 1 daily or as directed.    Type 2 diabetes mellitus with diabetic neuropathy, unspecified long term insulin use status (H)       levothyroxine 200 MCG tablet    SYNTHROID/LEVOTHROID    90 tablet    Take 1 tablet (200 mcg) by mouth daily    Hypothyroidism, unspecified type       losartan 50 MG tablet    COZAAR    90 tablet    TAKE 1 TABLET DAILY    Essential hypertension with goal blood pressure less than 140/90       metFORMIN 1000 MG tablet    GLUCOPHAGE    180 tablet    Take 1 tablet (1,000 mg) by mouth 2 times daily (with meals)    Type 2 diabetes mellitus with diabetic neuropathy, without long-term current use of insulin (H)       Multi-vitamin Tabs tablet   Generic drug:  multivitamin, therapeutic with minerals     30    1 TABLET DAILY        omeprazole-sodium bicarbonate  MG per capsule    ZEGERID     Take 1 capsule by mouth every morning (before breakfast)        * order for DME     1 Units    Equipment being ordered: single cane with rubber foot    Type 2 diabetes, HbA1C goal < 8% (H), Neuropathy in diabetes (H), Unsteady gait       * order for DME     1 Units    Equipment being ordered: four pronged cane    Unsteady gait, Neuropathy in diabetes (H), Type 2 diabetes, HbA1C goal < 8% (H)       simvastatin 20 MG tablet    ZOCOR    90 tablet    Take 1 tablet (20 mg) by mouth At Bedtime    Hyperlipidemia LDL goal <100       VITAMIN B 12 PO      Take 250 mcg by mouth daily    Excessive anger       VITAMIN D (CHOLECALCIFEROL) PO      Take 1,000 Units by mouth daily.        * Notice:  This list has 6 medication(s) that are the same as other  medications prescribed for you. Read the directions carefully, and ask your doctor or other care provider to review them with you.

## 2017-12-08 NOTE — LETTER
December 13, 2017      Steve Morgan  [id number]  [place] [facility] [place] [address]        Caio,    Your thyroid levels are a little high.  I am decreasing your levothyroxine dose to 175 mcg/day.  We'll recheck again at your next appointment in 3 months.    Resulted Orders   Hemoglobin A1c   Result Value Ref Range    Hemoglobin A1C 9.0 (H) 4.3 - 6.0 %   Albumin Random Urine Quantitative with Creat Ratio   Result Value Ref Range    Creatinine Urine 39 mg/dL    Albumin Urine mg/L 17 mg/L    Albumin Urine mg/g Cr 43.15 (H) 0 - 17 mg/g Cr   TSH with free T4 reflex   Result Value Ref Range    TSH 0.07 (L) 0.40 - 4.00 mU/L   T4 free   Result Value Ref Range    T4 Free 1.59 (H) 0.76 - 1.46 ng/dL       If you have any questions or concerns, please call the clinic at the number listed above.       Sincerely,        BRIDGET Payne CNP

## 2017-12-08 NOTE — TELEPHONE ENCOUNTER
Patient requesting a refill on his Westcort 0.2%.  States he uses it sparingly for 1 or 2 days at a time as needed for skin irritation at skin folds in groin, arm pits and abdomen.  Patient uses [facility] pharmacy in [place].  (updated in chart note)  Patient can be reached at [phone].  Ok to leave a detailed message on his voicemail per patient.  Please advise.  Rani Heck M.A.

## 2017-12-08 NOTE — TELEPHONE ENCOUNTER
Please call pt back and let him know I filled the Westcourt.  VM okay to leave.     Lisa Pierre PA-C

## 2017-12-08 NOTE — TELEPHONE ENCOUNTER
Patient brought in a certificate of medical necessity form from [facility] for diabetic footwear.  Dx: E11.40 & 279.4.  Patient has peripheral neuropathy with evidence of callus formation.  Patient has Medicare so form requires MD signature.  Form filled out and faxed to Dr. Meraz to be signed.  Will need to be faxed to [facility] at [phone]. (ph# [phone]).  Patient would like to be notified when this has been done.  Ok to leave a detailed message at [phone] per patient.  Rani Heck M.A.

## 2017-12-08 NOTE — LETTER
"    12/8/2017         RE: Steve Morgan  [id number]  [place] [address]        Dear Colleague,    Thank you for referring your patient, Steve Morgan, to the [facility]. Please see a copy of my visit note below.    Name: Steve \"Caio\" Cathy  Seen at the request of Lisa Pierre for Diabetes (Last seen 9/17/2017).  HPI:  Steve Morgan is a 57 year old male who presents for the evaluation/management of:    1. Type 2 DM:  Orginally diagnosed at the age of 33 or 38.  Was prediabetic prior, was not particularly symptomatic at the time, was noted on routine lab work    Current Regimen: Metformin 1000 mg bid, glipzide 20 mg q am, ? Basaglar 15 units qd (started 5/2017) - confusion over whether he is using Lantus or Basaglar but he thinks he is using basaglar.    BS checks: Infrequent - not testing since July.    Average Meter Download: did not bring meter    Elevated blood sugar due to continued dietary indiscretions, states he hasn't been watching his diet as carefully.  Likes to snack between meals, buys snacks at the gas station.    History of  lower right abdominal infection/ abcess June 2017.    Complications:   Diabetes Complications  Description / Detail    Diabetic Retinopathy  No retinopathy, early cataract, last exam 9/2016   CAD / PAD  No   Neuropathy  Yes: numbness hands and feet   Nephropathy / Microalbuminuria  No   Gastroparesis  No   Hypoglycemia Unawarness  Not sure, gets 'kind of dizzy' when blood sugar is low but reports history of low blood sugars without symptoms     2. Hypertension: Blood Pressure today:   BP Readings from Last 3 Encounters:   12/08/17 138/70   09/07/17 155/85   06/29/17 130/72   .  Blood pressure medications include atenolol 25 mg qd and losartan 50 mg qd  .  Takes medications everyday without forgetting a dose.  Denies feeling lightheaded or dizzy.   3. Hyperlipidemia: Takes simvastatin 20 mg for lipid control.  Denies muscle aches of " pains.       4. Prevention:  Flu Shot- 11/2016  Pneumovax- 2012  Opthalmology-Yes: due 6/2017  Dental-Yes: twice a year  ASA-Yes, 81 mg qd   Smoking- No  5.  Hypothyroidism. Currently treated with levothyroxine 200 mcg daily.  Takes the levothyroxine first thing in the morning and waits 30+ minutes before eating breakfast.    PMH/PSH:  Past Medical History:   Diagnosis Date     Cellulitis and abscess of trunk 6/27/2017     Depression      Hyperlipidemia LDL goal <100 10/31/2010     Hypertension goal BP (blood pressure) < 140/90 3/17/2011     Hypothyroidism 1/12/2010     Morbid obesity due to excess calories (H) 1/12/2010     Neuropathy in diabetes (H) 1/12/2010     Obesity 1/12/2010     Sleep apnea 1/12/2010    CPAP     Type 2 diabetes, HbA1C goal < 8% (H) 3/8/2011     Past Surgical History:   Procedure Laterality Date     COLONOSCOPY       GENITOURINARY SURGERY      surg for undescended testicle     REPAIR HAMMER TOE BILATERAL  5/16/2013    Procedure: REPAIR HAMMER TOE BILATERAL;  Flexor Tenotomy Toes 2,3,4,5 Bilateral Feet;  Surgeon: Saad Bangura DPM;  Location: [facility] OR     Family Hx:  Family History   Problem Relation Age of Onset     Breast Cancer Mother      Hypertension Mother      Thyroid Disease Mother      Depression Mother      Cancer - colorectal Father      Thyroid Disease Father      Depression Father      HEART DISEASE Maternal Grandmother      CHF     Circulatory Paternal Grandmother      CANCER Paternal Grandfather      DIABETES Brother      DIABETES Brother      Thyroid disease: Yes: mother         DM2: Yes: one brother with type 1 diabetes and one brother with type 2 diabetes, paternal aunt with DM2         Autoimmune: DM1, SLE, RA, Vitiligo Yes: brother with DM1    Social Hx:  Social History     Social History     Marital status:      Spouse name: Sri     Number of children: 2     Years of education: N/A     Occupational History      None      [employer]     Social History  Main Topics     Smoking status: Never Smoker     Smokeless tobacco: Never Used     Alcohol use Yes      Comment: occ     Drug use: No     Sexual activity: Yes     Partners: Female     Other Topics Concern     Parent/Sibling W/ Cabg, Mi Or Angioplasty Before 65f 55m? No     Social History Narrative          MEDICATIONS:  has a current medication list which includes the following prescription(s): insulin glargine, basaglar, blood glucose monitoring, blood glucose monitoring, insulin pen needle, blood glucose monitoring, losartan, ammonium lactate, bupropion, citalopram, simvastatin, atenolol, levothyroxine, hydrocortisone, glipizide, metformin, omeprazole-sodium bicarbonate, blood glucose monitoring, blood glucose monitoring, cyanocobalamin, calcium carb-cholecalciferol, order for dme, order for dme, cholecalciferol, ferrous sulfate, aspirin, and multi-vitamin, and the following Facility-Administered Medications: cefazolin.    ROS     ROS: 10 point ROS neg other than the symptoms noted above in the HPI.    Physical Exam   VS: /70 (BP Location: Left arm, Patient Position: Chair, Cuff Size: Adult Large)  Pulse 90  Temp 98  F (36.7  C) (Oral)  Resp 16  Wt 106.6 kg (235 lb 1.6 oz)  SpO2 98%  BMI 39.12 kg/m2  GENERAL: AXOX3, NAD, well dressed, answering questions appropriately, appears stated age.  HEENT: OP clear, no LAD, no TM, non-tender, no exopthalmous, no proptosis, EOMI, no lig lag, no retraction  CV: RRR, no rubs, gallops, no murmurs  LUNGS: CTAB, no wheezes, rales, or ronchi  ABDOMEN: soft, nontender, nondistended  EXTREMITIES: no edema, feet with 2+ pulses, absent plantar sensation bilaterally, scattered bilateral calluses, trace edema at the ankles  NEUROLOGY: CN grossly intact, + monofilament, + DTR upper and lower extremity, no tremors  MSK: grossly intact  SKIN: no rashes, no lesions    LABS:  A1c:   Component      Latest Ref Rng & Units 5/17/2017 9/7/2017 12/8/2017   Hemoglobin A1C      4.3 - 6.0  % 10.0 (H) 8.5 (H) 9.0 (H)     Basic Metabolic Panel:  !COMPREHENSIVE Latest Ref Rng & Units 5/17/2017   SODIUM 133 - 144 mmol/L 131 (L)   POTASSIUM 3.4 - 5.3 mmol/L 4.6   CHLORIDE 94 - 109 mmol/L 98   BUN 7 - 30 mg/dL 10   Creatinine 0.66 - 1.25 mg/dL 1.02   Glucose 70 - 99 mg/dL 368 (H)   ANION GAP 3 - 14 mmol/L 9   CALCIUM 8.5 - 10.1 mg/dL 9.2   ALBUMIN 3.4 - 5.0 g/dL 3.8     LFTS/Cholesterol Panel:  !LIPID/HEPATIC Latest Ref Rng & Units 5/17/2017   CHOLESTEROL <200 mg/dL 192   TRIGLYCERIDES <150 mg/dL 179 (H)   HDL CHOLESTEROL >39 mg/dL 30 (L)   LDL CHOLESTEROL, CALCULATED <100 mg/dL 126 (H)   VLDL-CHOLESTEROL 0 - 30 mg/dL    NON HDL CHOLESTEROL <130 mg/dL 162 (H)   CHOLESTEROL/HDL RATIO 0.0 - 5.0    AST 0 - 45 U/L 22   ALT 0 - 70 U/L 35     Thyroid Function:   !THYROID Latest Ref Rng & Units 12/22/2016 11/15/2016 5/19/2016   TSH 0.40 - 4.00 mU/L 0.48 11.71 (H) 5.15 (H)   T4 FREE 0.76 - 1.46 ng/dL  1.06 0.94     Urine MicroAlbumin:  Component    Latest Ref Rng & Units 11/15/2016   Creatinine Urine    mg/dL 17   Albumin Urine mg/L    mg/L <5   Albumin Urine mg/g Cr    0 - 17 mg/g Cr Unable to calc     Vitamin D:    All pertinent notes, labs, and images personally reviewed by me.     A/P  Mr.Walter Morgan is a 57 year old here for the evaluation/management of diabetes:    1. DM2 - Uncontrolled.  Increase Basaglar (or Lantus)  to 18 units qd.  Continue to increase as needed per titration scale provided to max dose of 45 units qd.   Encouraged to test blood sugar regularly 2-3 x daily, before breakfast and at bedtime, + some mid-day and 2-hour postprandial testing. Follow up with diabetes ed.  There is some variability among people, most will usually develop symptoms suggestive of hypoglycemia when blood glucose levels are lowered to the mid 60's. The first set of symptoms are called adrenergic. Patients may experience any of the following nervousness, sweating, intense hunger, trembling, weakness, palpitations, and  difficulty speaking.   The acute management of hypoglycemia involves the rapid delivery of a source of easily absorbed sugar. Regular soda, juice, lifesavers, table sugar, are good options. 15 grams of glucose is the dose that is given, followed by an assessment of symptoms and a blood glucose check if possible. If after 10 minutes there is no improvement, another 10-15 grams should be given. This can be repeated up to three times. The equivalency of 10-15 grams of glucose (approximate servings) are: 3-5 hard candies, 3 teaspoons of sugar, or 1/2 cup of regular soda or juice.      2. Hypertension - Variable.      3. Hyperlipidemia - On statin therapy.    Labs ordered today:   Orders Placed This Encounter   Procedures     Hemoglobin A1c     Albumin Random Urine Quantitative with Creat Ratio       Radiology/Consults ordered today: None    All questions were answered.  The patient indicates understanding of the above issues and agrees with the plan set forth.  Total face to face time greater than or equal to 25 minutes.       Follow-up:  3 months    Diana Butler NP  Endocrinology  [facility]  CC: Lisa Pierre    Again, thank you for allowing me to participate in the care of your patient.        Sincerely,        BRIDGET Payne CNP

## 2017-12-08 NOTE — TELEPHONE ENCOUNTER
Received a request from [facility] for documentation required for Medicare Claims regarding Contour Next EZ strips as testing exceeds [facility] allowance of 3x/day for insulin treated diabetics.  Last Endocrinology clinic note and Hgb A1c faxed to [facility] at fax# [phone].  (ph# [phone]) No signature required.  Rani Heck M.A.

## 2017-12-08 NOTE — PATIENT INSTRUCTIONS
Your  A1c today is higher.    Increase Basaglar to 18 units once daily  Start testing your blood sugars twice per day - morning and bedtime.  Watch your diet carefully and try to limit carbs at meals to 3, no more that 4 serving at each meal.  Increase the Basaglar by one unit every 3-4 days until your morning blood sugar is under 130.   Do not increase Basaglar to more than 45 units once per day.  You'll need to get in touch with me to let me know how many more units of Basaglar you are taking so I can update your prescription.    A1c:   Component      Latest Ref Rng & Units 12/22/2016 5/17/2017 9/7/2017 12/8/2017   Hemoglobin A1C      4.3 - 6.0 % 7.7 (H) 10.0 (H) 8.5% (H) 9.0 (H)     Follow up in 3 months for a recheck.    Diana Butler NP  Endocrinology

## 2017-12-09 LAB
CREAT UR-MCNC: 39 MG/DL
MICROALBUMIN UR-MCNC: 17 MG/L
MICROALBUMIN/CREAT UR: 43.15 MG/G CR (ref 0–17)
T4 FREE SERPL-MCNC: 1.59 NG/DL (ref 0.76–1.46)
TSH SERPL DL<=0.005 MIU/L-ACNC: 0.07 MU/L (ref 0.4–4)

## 2017-12-12 ENCOUNTER — OFFICE VISIT (OUTPATIENT)
Dept: FAMILY MEDICINE | Facility: CLINIC | Age: 57
End: 2017-12-12
Payer: COMMERCIAL

## 2017-12-12 VITALS
TEMPERATURE: 98.2 F | HEART RATE: 88 BPM | HEIGHT: 65 IN | WEIGHT: 235.6 LBS | DIASTOLIC BLOOD PRESSURE: 68 MMHG | BODY MASS INDEX: 39.25 KG/M2 | SYSTOLIC BLOOD PRESSURE: 129 MMHG | OXYGEN SATURATION: 97 %

## 2017-12-12 DIAGNOSIS — G47.33 OSA (OBSTRUCTIVE SLEEP APNEA): ICD-10-CM

## 2017-12-12 DIAGNOSIS — Z00.00 ROUTINE GENERAL MEDICAL EXAMINATION AT A HEALTH CARE FACILITY: Primary | ICD-10-CM

## 2017-12-12 DIAGNOSIS — N40.1 BENIGN PROSTATIC HYPERPLASIA WITH URINARY HESITANCY: ICD-10-CM

## 2017-12-12 DIAGNOSIS — R45.4 EXCESSIVE ANGER: ICD-10-CM

## 2017-12-12 DIAGNOSIS — I10 ESSENTIAL HYPERTENSION WITH GOAL BLOOD PRESSURE LESS THAN 140/90: ICD-10-CM

## 2017-12-12 DIAGNOSIS — R39.11 BENIGN PROSTATIC HYPERPLASIA WITH URINARY HESITANCY: ICD-10-CM

## 2017-12-12 DIAGNOSIS — Z12.5 ENCOUNTER FOR SCREENING FOR MALIGNANT NEOPLASM OF PROSTATE: ICD-10-CM

## 2017-12-12 DIAGNOSIS — E03.9 HYPOTHYROIDISM, UNSPECIFIED TYPE: ICD-10-CM

## 2017-12-12 LAB — PSA SERPL-ACNC: 0.09 UG/L (ref 0–4)

## 2017-12-12 PROCEDURE — 99396 PREV VISIT EST AGE 40-64: CPT | Performed by: PHYSICIAN ASSISTANT

## 2017-12-12 PROCEDURE — G0103 PSA SCREENING: HCPCS | Performed by: PHYSICIAN ASSISTANT

## 2017-12-12 RX ORDER — LEVOTHYROXINE SODIUM 200 UG/1
200 TABLET ORAL DAILY
Qty: 90 TABLET | Refills: 1 | Status: SHIPPED | OUTPATIENT
Start: 2017-12-12 | End: 2017-12-13 | Stop reason: DRUGHIGH

## 2017-12-12 RX ORDER — ATENOLOL 25 MG/1
25 TABLET ORAL EVERY MORNING
Qty: 90 TABLET | Refills: 1 | Status: SHIPPED | OUTPATIENT
Start: 2017-12-12 | End: 2018-06-04

## 2017-12-12 RX ORDER — CITALOPRAM HYDROBROMIDE 20 MG/1
20 TABLET ORAL DAILY
Qty: 90 TABLET | Refills: 1 | Status: SHIPPED | OUTPATIENT
Start: 2017-12-12 | End: 2018-06-04

## 2017-12-12 RX ORDER — FINASTERIDE 5 MG/1
5 TABLET, FILM COATED ORAL DAILY
Qty: 30 TABLET | Refills: 3 | Status: SHIPPED | OUTPATIENT
Start: 2017-12-12 | End: 2018-01-08

## 2017-12-12 RX ORDER — BUPROPION HYDROCHLORIDE 300 MG/1
300 TABLET ORAL EVERY MORNING
Qty: 90 TABLET | Refills: 1 | Status: SHIPPED | OUTPATIENT
Start: 2017-12-12 | End: 2018-06-04

## 2017-12-12 ASSESSMENT — PATIENT HEALTH QUESTIONNAIRE - PHQ9
SUM OF ALL RESPONSES TO PHQ QUESTIONS 1-9: 8
10. IF YOU CHECKED OFF ANY PROBLEMS, HOW DIFFICULT HAVE THESE PROBLEMS MADE IT FOR YOU TO DO YOUR WORK, TAKE CARE OF THINGS AT HOME, OR GET ALONG WITH OTHER PEOPLE: SOMEWHAT DIFFICULT
SUM OF ALL RESPONSES TO PHQ QUESTIONS 1-9: 8

## 2017-12-12 NOTE — MR AVS SNAPSHOT
After Visit Summary   12/12/2017    Steve Morgan    MRN: [id number]           Patient Information     Date Of Birth          1960        Visit Information        Provider Department      12/12/2017 2:00 PM Lisa Pierre PA-C [facility] Instructions      Preventive Health Recommendations  Male Ages 50 - 64    Yearly exam:             See your health care provider every year in order to  o   Review health changes.   o   Discuss preventive care.    o   Review your medicines if your doctor has prescribed any.     Have a cholesterol test every 5 years, or more frequently if you are at risk for high cholesterol/heart disease.     Have a diabetes test (fasting glucose) every three years. If you are at risk for diabetes, you should have this test more often.     Have a colonoscopy at age 50, or have a yearly FIT test (stool test). These exams will check for colon cancer.      Talk with your health care provider about whether or not a prostate cancer screening test (PSA) is right for you.    You should be tested each year for STDs (sexually transmitted diseases), if you re at risk.     Shots: Get a flu shot each year. Get a tetanus shot every 10 years.     Nutrition:    Eat at least 5 servings of fruits and vegetables daily.     Eat whole-grain bread, whole-wheat pasta and brown rice instead of white grains and rice.     Talk to your provider about Calcium and Vitamin D.     Lifestyle    Exercise for at least 150 minutes a week (30 minutes a day, 5 days a week). This will help you control your weight and prevent disease.     Limit alcohol to one drink per day.     No smoking.     Wear sunscreen to prevent skin cancer.     See your dentist every six months for an exam and cleaning.     See your eye doctor every 1 to 2 years.            Follow-ups after your visit        Your next 10 appointments already scheduled     Dec 12, 2017  2:00 PM CST   PHYSICAL with Lisa GUERRERO  "ANASTASIA Pierre   [facility] ([facility])    [address] [address] [facility] [address] [phone]            Mar 14, 2018  8:30 AM CDT   Return Visit with BRIDGET Payne CNP   [facility] ([facility])    [address] [facility] [address] [phone]              Who to contact     If you have questions or need follow up information about today's clinic visit or your schedule please contact [facility] directly at [phone].  Normal or non-critical lab and imaging results will be communicated to you by MyChart, letter or phone within 4 business days after the clinic has received the results. If you do not hear from us within 7 days, please contact the clinic through [employer] or phone. If you have a critical or abnormal lab result, we will notify you by phone as soon as possible.  Submit refill requests through [employer] or call your pharmacy and they will forward the refill request to us. Please allow 3 business days for your refill to be completed.          Additional Information About Your Visit        [employer] Information     [employer] gives you secure access to your electronic health record. If you see a primary care provider, you can also send messages to your care team and make appointments. If you have questions, please call your primary care clinic.  If you do not have a primary care provider, please call [phone] and they will assist you.        Care EveryWhere ID     This is your Care EveryWhere ID. This could be used by other organizations to access your [place] medical records  [id number]        Your Vitals Were     Pulse Temperature Height Pulse Oximetry BMI (Body Mass Index)       88 98.2  F (36.8  C) (Oral) 5' 5\" (1.651 m) 97% 39.21 kg/m2        Blood Pressure from Last 3 Encounters:   12/12/17 149/79   12/08/17 138/70   09/07/17 155/85    Weight from Last 3 Encounters: "   12/12/17 235 lb 9.6 oz (106.9 kg)   12/08/17 235 lb 1.6 oz (106.6 kg)   09/07/17 237 lb 9.6 oz (107.8 kg)              Today, you had the following     No orders found for display       Primary Care Provider Office Phone # Fax #    Lisa GUERRERO ANASTASIA Pierre [phone] [address] [facility] [address]        Equal Access to Services     KE ALMONTE : Hadii aad ku hadasho Soomaali, waaxda luqadaha, qaybta kaalmada adeegyada, waxay idiin hayaan adeeg kharash la'nikolasn . So [facility] [phone].    ATENCIÓN: Si habla español, tiene a adler disposición servicios gratuitos de asistencia lingüística. [facility] [phone].    We comply with applicable federal civil rights laws and [place] laws. We do not discriminate on the basis of race, color, national origin, age, disability, sex, sexual orientation, or gender identity.            Thank you!     Thank you for choosing [facility]  for your care. Our goal is always to provide you with excellent care. Hearing back from our patients is one way we can continue to improve our services. Please take a few minutes to complete the written survey that you may receive in the mail after your visit with us. Thank you!             Your Updated Medication List - Protect others around you: Learn how to safely use, store and throw away your medicines at www.disposemymeds.org.          This list is accurate as of: 12/12/17  1:59 PM.  Always use your most recent med list.                   Brand Name Dispense Instructions for use Diagnosis    ammonium lactate 12 % cream    LAC-HYDRIN    385 g    Apply topically 2 times daily as needed for dry skin    Diabetic polyneuropathy associated with type 2 diabetes mellitus (H), Pre-ulcerative calluses, Pes planus of both feet       aspirin 81 MG tablet     100    ONE DAILY        atenolol 25 MG tablet    TENORMIN    90 tablet    Take 1 tablet (25 mg) by mouth every morning    Essential hypertension with goal  blood pressure less than 140/90       * blood glucose monitoring lancets     1 Box    Use to test blood sugars 2 times daily or as directed.    Type 2 diabetes, HbA1C goal < 8% (H)       * blood glucose monitoring lancets     100 each    Use to test blood sugar 3 times daily or as directed.    Type 2 diabetes mellitus with diabetic neuropathy, unspecified long term insulin use status (H)       * blood glucose monitoring test strip    ONETOUCH VERIO IQ    100 strip    Use to test blood sugars 2 times daily or as directed.    Type 2 diabetes, HbA1C goal < 8% (H)       * blood glucose monitoring test strip    KERLINE CONTOUR NEXT    100 strip    Use to test blood sugar 3 times daily or as directed.    Type 2 diabetes mellitus with diabetic neuropathy, unspecified long term insulin use status (H)       buPROPion 300 MG 24 hr tablet    WELLBUTRIN XL    90 tablet    Take 1 tablet (300 mg) by mouth every morning    Excessive anger       CALCIUM 600 + D PO      Take 1 tablet by mouth daily        citalopram 20 MG tablet    celeXA    90 tablet    Take 1 tablet (20 mg) by mouth daily    Excessive anger       ferrous sulfate 325 (65 FE) MG tablet    IRON     Take 1 tablet by mouth daily (with breakfast).        glipiZIDE 10 MG 24 hr tablet    GLUCOTROL XL    180 tablet    Take 2 tablets (20 mg) by mouth every morning    Type 2 diabetes mellitus with diabetic neuropathy, without long-term current use of insulin (H)       hydrocortisone 0.2 % cream    WESTCORT    45 g    Apply topically 2 times daily as needed Apply sparingly to affected area, BID.    Dermatitis       insulin pen needle 31G X 5 MM    B-D U/F    100 each    Use 1 daily or as directed.    Type 2 diabetes mellitus with diabetic neuropathy, unspecified long term insulin use status (H)       * LANTUS SOLOSTAR 100 UNIT/ML injection   Generic drug:  insulin glargine     15 mL    Inject 18 units SQ daily    Type 2 diabetes mellitus with diabetic neuropathy, unspecified  long term insulin use status (H)       * insulin glargine 100 UNIT/ML injection    LANTUS SOLOSTAR    45 mL    18 units qd.  Increase as directed to max dose of 45 units qd.    Type 2 diabetes mellitus with diabetic neuropathy, with long-term current use of insulin (H)       levothyroxine 200 MCG tablet    SYNTHROID/LEVOTHROID    90 tablet    Take 1 tablet (200 mcg) by mouth daily    Hypothyroidism, unspecified type       losartan 50 MG tablet    COZAAR    90 tablet    TAKE 1 TABLET DAILY    Essential hypertension with goal blood pressure less than 140/90       metFORMIN 1000 MG tablet    GLUCOPHAGE    180 tablet    Take 1 tablet (1,000 mg) by mouth 2 times daily (with meals)    Type 2 diabetes mellitus with diabetic neuropathy, without long-term current use of insulin (H)       Multi-vitamin Tabs tablet   Generic drug:  multivitamin, therapeutic with minerals     30    1 TABLET DAILY        omeprazole-sodium bicarbonate  MG per capsule    ZEGERID     Take 1 capsule by mouth every morning (before breakfast)        * order for DME     1 Units    Equipment being ordered: single cane with rubber foot    Type 2 diabetes, HbA1C goal < 8% (H), Neuropathy in diabetes (H), Unsteady gait       * order for DME     1 Units    Equipment being ordered: four pronged cane    Unsteady gait, Neuropathy in diabetes (H), Type 2 diabetes, HbA1C goal < 8% (H)       simvastatin 20 MG tablet    ZOCOR    90 tablet    Take 1 tablet (20 mg) by mouth At Bedtime    Hyperlipidemia LDL goal <100       VITAMIN B 12 PO      Take 250 mcg by mouth daily    Excessive anger       VITAMIN D (CHOLECALCIFEROL) PO      Take 1,000 Units by mouth daily.        * Notice:  This list has 8 medication(s) that are the same as other medications prescribed for you. Read the directions carefully, and ask your doctor or other care provider to review them with you.

## 2017-12-12 NOTE — PROGRESS NOTES
SUBJECTIVE:   CC: Steve Morgan is an 57 year old male who presents for preventative health visit.     Physical   Annual:     Getting at least 3 servings of Calcium per day::  NO    Bi-annual eye exam::  Yes    Dental care twice a year::  Yes    Sleep apnea or symptoms of sleep apnea::  Daytime drowsiness and Sleep apnea    Diet::  Regular (no restrictions)    Frequency of exercise::  None    Taking medications regularly::  Yes    Medication side effects::  None    Additional concerns today::  No            Genitourinary - Male  Onset: years    Description:   Dysuria (painful urination): no   Hematuria (blood in urine): no   Frequency: no   Are you urinating at night : YES  Hesitancy (delay in urine): YES  Retention (unable to empty): no   Decrease in urinary flow: YES  Incontinence: no     Progression of Symptoms:  worsening    Accompanying Signs & Symptoms:  Fever: no   Back/Flank pain: no   Urethral discharge: no   Testicle lumps/masses/pain: no   Nausea and/or vomiting: no   Abdominal pain: no     History:   History of frequent UTI's: no   History of kidney stones: no   History of hernias: no   Personal or Family history of Prostate problems: no  Sexually active: YES    Precipitating factors:       Alleviating factors:      Hypertension Follow-up      Outpatient blood pressures are not being checked.    Low Salt Diet: low salt    Hypothyroidism Follow-up    Since last visit, patient describes the following symptoms: Weight stable, no hair loss, no skin changes, no constipation, no loose stools    Using celexa and welbutrin for mood.     Working well for pt. Denies SE          Today's PHQ-2 Score:   PHQ-2 ( 1999 [employer]) 12/12/2017   Q1: Little interest or pleasure in doing things 1   Q2: Feeling down, depressed or hopeless 0   PHQ-2 Score 1   Q1: Little interest or pleasure in doing things Several days   Q2: Feeling down, depressed or hopeless Not at all   PHQ-2 Score 1       Abuse: Current or Past(Physical,  "Sexual or Emotional)- No  Do you feel safe in your environment - Yes    Social History   Substance Use Topics     Smoking status: Never Smoker     Smokeless tobacco: Never Used     Alcohol use Yes      Comment: occ     The patient does not drink >3 drinks per day nor >7 drinks per week.    Last PSA:   PSA   Date Value Ref Range Status   05/19/2016 0.10 0 - 4 ug/L Final       Reviewed orders with patient. Reviewed health maintenance and updated orders accordingly - Yes  Labs reviewed in EPIC    Reviewed and updated as needed this visit by clinical staff  Tobacco  Allergies  Meds  Med Hx  Surg Hx  Fam Hx  Soc Hx        Reviewed and updated as needed this visit by Provider        Past Medical History:   Diagnosis Date     Cellulitis and abscess of trunk 6/27/2017     Depression      Hyperlipidemia LDL goal <100 10/31/2010     Hypertension goal BP (blood pressure) < 140/90 3/17/2011     Hypothyroidism 1/12/2010     Morbid obesity due to excess calories (H) 1/12/2010     Neuropathy in diabetes (H) 1/12/2010     Obesity 1/12/2010     Sleep apnea 1/12/2010    CPAP     Type 2 diabetes, HbA1C goal < 8% (H) 3/8/2011        Review of Systems  C: NEGATIVE for fever, chills, change in weight  I: NEGATIVE for worrisome rashes, moles or lesions  E: NEGATIVE for vision changes or irritation  ENT: NEGATIVE for ear, mouth and throat problems  R: NEGATIVE for significant cough or SOB  CV: NEGATIVE for chest pain, palpitations or peripheral edema  GI: NEGATIVE for nausea, abdominal pain, heartburn, or change in bowel habits   male: negative for dysuria, hematuria, decreased urinary stream, erectile dysfunction, urethral discharge  M: NEGATIVE for significant arthralgias or myalgia  N: NEGATIVE for weakness, dizziness or paresthesias  P: NEGATIVE for changes in mood or affect    OBJECTIVE:   /79 (BP Location: Right arm, Patient Position: Chair, Cuff Size: Adult Large)  Pulse 88  Temp 98.2  F (36.8  C) (Oral)  Ht 5' 5\" " (1.651 m)  Wt 235 lb 9.6 oz (106.9 kg)  SpO2 97%  BMI 39.21 kg/m2    Physical Exam  GENERAL: healthy, alert and no distress  EYES: Eyes grossly normal to inspection, PERRL and conjunctivae and sclerae normal  HENT: ear canals and TM's normal, nose and mouth without ulcers or lesions  NECK: no adenopathy, no asymmetry, masses, or scars and thyroid normal to palpation  RESP: lungs clear to auscultation - no rales, rhonchi or wheezes  CV: regular rate and rhythm, normal S1 S2, no S3 or S4, no murmur, click or rub, no peripheral edema and peripheral pulses strong  ABDOMEN: soft, nontender, no hepatosplenomegaly, no masses and bowel sounds normal  MS: no gross musculoskeletal defects noted, no edema  SKIN: no suspicious lesions or rashes  NEURO: Normal strength and tone, mentation intact and speech normal  PSYCH: mentation appears normal, affect normal/bright    ASSESSMENT/PLAN:   1. Routine general medical examination at a health care facility    - PSA, screen    2. DAMIÁN (obstructive sleep apnea)  Referred back to sleep center for download of machine and new supplies  - SLEEP EVALUATION & MANAGEMENT REFERRAL - [facility]  [phone] (Age 18 and up); Future    3. Encounter for screening for malignant neoplasm of prostate     - PSA, screen    4. Benign prostatic hyperplasia with urinary hesitancy  Symptoms sound consistent with BPH  Risks/benefits of medication use discussed with patient. Patient reports understand and accepts trial of medication.  Call if working and can fill for 90 tabs w/ refills.   - finasteride (PROSCAR) 5 MG tablet; Take 1 tablet (5 mg) by mouth daily  Dispense: 30 tablet; Refill: 3    5. Excessive anger  Stable.   - citalopram (CELEXA) 20 MG tablet; Take 1 tablet (20 mg) by mouth daily  Dispense: 90 tablet; Refill: 1  - buPROPion (WELLBUTRIN XL) 300 MG 24 hr tablet; Take 1 tablet (300 mg) by mouth every morning  Dispense: 90 tablet; Refill: 1    6. Essential  "hypertension with goal blood pressure less than 140/90  Stable.   - atenolol (TENORMIN) 25 MG tablet; Take 1 tablet (25 mg) by mouth every morning  Dispense: 90 tablet; Refill: 1    7. Hypothyroidism, unspecified type    - levothyroxine (SYNTHROID/LEVOTHROID) 200 MCG tablet; Take 1 tablet (200 mcg) by mouth daily  Dispense: 90 tablet; Refill: 1    COUNSELING:   Reviewed preventive health counseling, as reflected in patient instructions       Regular exercise       Healthy diet/nutrition       Vision screening       Hearing screening       Prostate cancer screening         reports that he has never smoked. He has never used smokeless tobacco.      Estimated body mass index is 39.21 kg/(m^2) as calculated from the following:    Height as of this encounter: 5' 5\" (1.651 m).    Weight as of this encounter: 235 lb 9.6 oz (106.9 kg).   Weight management plan: Discussed healthy diet and exercise guidelines and patient will follow up in 12 months in clinic to re-evaluate.    Counseling Resources:  ATP IV Guidelines  Pooled Cohorts Equation Calculator  FRAX Risk Assessment  ICSI Preventive Guidelines  Dietary Guidelines for Americans, 2010  [employer]'s My[employer]ate  ASA Prophylaxis  Lung CA Screening    Lisa Pierre PA-C  [facility]  Answers for HPI/ROS submitted by the patient on 12/12/2017   PHQ-2 Score: 1  If you checked off any problems, how difficult have these problems made it for you to do your work, take care of things at home, or get along with other people?: Somewhat difficult  PHQ9 TOTAL SCORE: 8    "

## 2017-12-13 ENCOUNTER — TELEPHONE (OUTPATIENT)
Dept: ENDOCRINOLOGY | Facility: CLINIC | Age: 57
End: 2017-12-13

## 2017-12-13 DIAGNOSIS — E03.4 HYPOTHYROIDISM DUE TO ACQUIRED ATROPHY OF THYROID: Primary | ICD-10-CM

## 2017-12-13 RX ORDER — LEVOTHYROXINE SODIUM 175 UG/1
175 TABLET ORAL DAILY
Qty: 90 TABLET | Refills: 1 | Status: SHIPPED | OUTPATIENT
Start: 2017-12-13 | End: 2018-06-14

## 2017-12-13 ASSESSMENT — PATIENT HEALTH QUESTIONNAIRE - PHQ9: SUM OF ALL RESPONSES TO PHQ QUESTIONS 1-9: 8

## 2017-12-13 NOTE — TELEPHONE ENCOUNTER
Notes Recorded by Diana Butler, APRN CNP on 12/13/2017 at 9:37 AM  Please call -  Caio,  Your thyroid levels are a little high.  I am decreasing your levothyroxine dose to 175 mcg/day.  We'll recheck again at your next appointment in 3 months.  Diana Butler NP  Endocrinology      LMOM for patient to call back to the [place] station.  Letter mailed to patient as well for his records.  Rani Heck M.A.

## 2017-12-13 NOTE — PROGRESS NOTES
Please call -  Caio,  Your thyroid levels are a little high.  I am decreasing your levothyroxine dose to 175 mcg/day.  We'll recheck again at your next appointment in 3 months.  Diana Butler NP  Endocrinology

## 2017-12-13 NOTE — TELEPHONE ENCOUNTER
Patient calling back.  Confused on why dose decreasing if level is high.  Discussed thyroid being opposite and if high is low and if low is high.  No further questions after discussing that.  Will pick-up new dose and recheck in 3 months.  Daisy Bradley RN

## 2017-12-14 NOTE — TELEPHONE ENCOUNTER
Called patient [phone] and left a detailed voicemail per his request that form was faxed.  Advised to call back with any questions.  Rani Heck M.A.

## 2018-01-05 ENCOUNTER — TELEPHONE (OUTPATIENT)
Dept: FAMILY MEDICINE | Facility: CLINIC | Age: 58
End: 2018-01-05

## 2018-01-05 NOTE — TELEPHONE ENCOUNTER
Pt calls, had episode of lightheadedness this am after coughing spell, happened 6 am before breakfast, wife witnessed this, resolved, discussed below at length, pt made f/u appointment Monday, recommend appointment at UC if concerns or if above issue returns, discussed neti pot, coricidin BP, ask pharmacist what best to use for DM, discussed viral vs bacterial, not appropriate for antibiotics at this time, usually treated symptomatically, recommend UC today or this weekend if sxs worsen or persist, pt agrees    SUBJECTIVE:  Symptoms: Facial pain or pressue over the sinus areas, especially if worse with position change or cough, Nasal congestion, Tooth pain, Post nasal drip and productive cough.     In addition notes: None   Shortness of breath: NO  Onset of symptoms was 3 day(s) ago.    Treatment measures tried include ASA, oregano oil and nilson seltzer.   Course of illness is worsening.  Predisposing conditions include: None    Complicating Factors and Serious Symptoms:   Patient reports: NONE   Patient denies: Fever > 102.5  Orbital pain  Periorbital swelling or erythema  Severe facial swelling or erythema  Severe neck pain  This being the worse headache the patient has ever experienced  Vision changes  COPD (chronic obstructive pulmonary disease)      Encounter handled by: Nurse Triage.     Lucía Bond RN

## 2018-01-08 ENCOUNTER — OFFICE VISIT (OUTPATIENT)
Dept: FAMILY MEDICINE | Facility: CLINIC | Age: 58
End: 2018-01-08
Payer: COMMERCIAL

## 2018-01-08 VITALS
SYSTOLIC BLOOD PRESSURE: 138 MMHG | BODY MASS INDEX: 38.99 KG/M2 | DIASTOLIC BLOOD PRESSURE: 84 MMHG | HEIGHT: 65 IN | WEIGHT: 234 LBS | HEART RATE: 81 BPM | TEMPERATURE: 98.7 F | OXYGEN SATURATION: 98 % | RESPIRATION RATE: 20 BRPM

## 2018-01-08 DIAGNOSIS — N40.1 BENIGN PROSTATIC HYPERPLASIA WITH URINARY HESITANCY: ICD-10-CM

## 2018-01-08 DIAGNOSIS — R39.11 BENIGN PROSTATIC HYPERPLASIA WITH URINARY HESITANCY: ICD-10-CM

## 2018-01-08 DIAGNOSIS — J20.9 ACUTE BRONCHITIS, UNSPECIFIED ORGANISM: Primary | ICD-10-CM

## 2018-01-08 DIAGNOSIS — S60.419A ABRASION OF FINGER WITH INFECTION, INITIAL ENCOUNTER: ICD-10-CM

## 2018-01-08 DIAGNOSIS — L08.9 ABRASION OF FINGER WITH INFECTION, INITIAL ENCOUNTER: ICD-10-CM

## 2018-01-08 PROCEDURE — 99214 OFFICE O/P EST MOD 30 MIN: CPT | Performed by: FAMILY MEDICINE

## 2018-01-08 RX ORDER — FINASTERIDE 5 MG/1
5 TABLET, FILM COATED ORAL DAILY
Qty: 90 TABLET | Refills: 3 | Status: SHIPPED | OUTPATIENT
Start: 2018-01-08 | End: 2018-10-31

## 2018-01-08 NOTE — PROGRESS NOTES
SUBJECTIVE:   Steve Morgan is a 57 year old male who presents to clinic today for the following health issues:      Acute Illness   Acute illness concerns: URI  Onset: 6 days    Fever: no    Chills/Sweats: YES    Headache (location?): YES    Sinus Pressure:YES- facial pain    Conjunctivitis:  no    Ear Pain: no    Rhinorrhea: no    Congestion: YES    Sore Throat: YES     Cough: YES-non-productive, with shortness of breath    Wheeze: no    Decreased Appetite: YES    Nausea: no    Vomiting: YES    Diarrhea:  no    Dysuria/Freq.: no    Fatigue/Achiness: YES    Sick/Strep Exposure: YES     Therapies Tried and outcome: OTC medicine.        Also noticed swelling and pus oozing from the Rt index finger, pt admits to chewing his fingers.     Problem list and histories reviewed & adjusted, as indicated.  Additional history: as documented    Patient Active Problem List   Diagnosis     Anemia     Morbid obesity due to excess calories (H)     Sleep apnea     Neuropathy in diabetes (H)     Hypothyroidism     HYPERLIPIDEMIA LDL GOAL <100     Hypertension goal BP (blood pressure) < 140/90     Peripheral arterial disease (H)     Tremor     Peripheral neuropathy     Excessive anger     Generalized muscle weakness     Health Care Home     Unsteady gait     DAMIÁN (obstructive sleep apnea)     Vitamin B12 deficiency without anemia     Hyponatremia     Chronic hyponatremia     Type 2 diabetes mellitus with diabetic neuropathy (H)     Depression     Cellulitis and abscess of trunk     Benign prostatic hyperplasia with urinary hesitancy     Past Surgical History:   Procedure Laterality Date     COLONOSCOPY       GENITOURINARY SURGERY      surg for undescended testicle     REPAIR HAMMER TOE BILATERAL  5/16/2013    Procedure: REPAIR HAMMER TOE BILATERAL;  Flexor Tenotomy Toes 2,3,4,5 Bilateral Feet;  Surgeon: Saad Bangura DPM;  Location:  OR       Social History   Substance Use Topics     Smoking status: Never Smoker     Smokeless  tobacco: Never Used     Alcohol use Yes      Comment: occ     Family History   Problem Relation Age of Onset     Breast Cancer Mother      Hypertension Mother      Thyroid Disease Mother      Depression Mother      Cancer - colorectal Father      Thyroid Disease Father      Depression Father      HEART DISEASE Maternal Grandmother      CHF     Circulatory Paternal Grandmother      CANCER Paternal Grandfather      DIABETES Brother      DIABETES Brother          Current Outpatient Prescriptions   Medication Sig Dispense Refill     finasteride (PROSCAR) 5 MG tablet Take 1 tablet (5 mg) by mouth daily 90 tablet 3     amoxicillin-clavulanate (AUGMENTIN) 875-125 MG per tablet Take 1 tablet by mouth 2 times daily 20 tablet 0     LEVOXYL 175 MCG tablet Take 1 tablet (175 mcg) by mouth daily 90 tablet 1     citalopram (CELEXA) 20 MG tablet Take 1 tablet (20 mg) by mouth daily 90 tablet 1     atenolol (TENORMIN) 25 MG tablet Take 1 tablet (25 mg) by mouth every morning 90 tablet 1     buPROPion (WELLBUTRIN XL) 300 MG 24 hr tablet Take 1 tablet (300 mg) by mouth every morning 90 tablet 1     insulin glargine (LANTUS SOLOSTAR) 100 UNIT/ML injection 18 units qd.  Increase as directed to max dose of 45 units qd. 45 mL 1     glipiZIDE (GLUCOTROL XL) 10 MG 24 hr tablet Take 2 tablets (20 mg) by mouth every morning 180 tablet 1     insulin pen needle (B-D U/F) 31G X 5 MM Use 1 daily or as directed. 100 each prn     metFORMIN (GLUCOPHAGE) 1000 MG tablet Take 1 tablet (1,000 mg) by mouth 2 times daily (with meals) 180 tablet 1     hydrocortisone (WESTCORT) 0.2 % cream Apply topically 2 times daily as needed Apply sparingly to affected area, BID. 45 g 1     insulin glargine (LANTUS SOLOSTAR) 100 UNIT/ML injection Inject 18 units SQ daily 15 mL 3     blood glucose monitoring (KERLINE CONTOUR NEXT) test strip Use to test blood sugar 3 times daily or as directed. 100 strip 11     blood glucose monitoring (KERLINE MICROLET) lancets Use to test  "blood sugar 3 times daily or as directed. 100 each 11     losartan (COZAAR) 50 MG tablet TAKE 1 TABLET DAILY 90 tablet 2     ammonium lactate (LAC-HYDRIN) 12 % cream Apply topically 2 times daily as needed for dry skin 385 g 3     simvastatin (ZOCOR) 20 MG tablet Take 1 tablet (20 mg) by mouth At Bedtime 90 tablet 3     omeprazole-sodium bicarbonate (ZEGERID)  MG per capsule Take 1 capsule by mouth every morning (before breakfast)       blood glucose (ONE TOUCH VERIO IQ) test strip Use to test blood sugars 2 times daily or as directed. 100 strip 0     blood glucose monitoring (ONE TOUCH DELICA) lancets Use to test blood sugars 2 times daily or as directed. 1 Box prn     Cyanocobalamin (VITAMIN B 12 PO) Take 250 mcg by mouth daily       Calcium Carb-Cholecalciferol (CALCIUM 600 + D PO) Take 1 tablet by mouth daily       ORDER FOR DME Equipment being ordered: four pronged cane 1 Units 0     ORDER FOR DME Equipment being ordered: single cane with rubber foot 1 Units 0     VITAMIN D, CHOLECALCIFEROL, PO Take 1,000 Units by mouth daily.       ferrous sulfate 325 (65 FE) MG tablet Take 1 tablet by mouth daily (with breakfast).       ASPIRIN 81 MG OR TABS ONE DAILY 100 3     MULTI-VITAMIN OR TABS 1 TABLET DAILY 30 0     No Known Allergies      Reviewed and updated as needed this visit by clinical staffTobacco  Allergies  Meds  Med Hx  Surg Hx  Fam Hx  Soc Hx      Reviewed and updated as needed this visit by Provider         ROS:  C: NEGATIVE for fever, chills, change in weight  CV: NEGATIVE for chest pain, palpitations or peripheral edema    OBJECTIVE:     /84 (BP Location: Right arm, Patient Position: Chair, Cuff Size: Adult Large)  Pulse 81  Temp 98.7  F (37.1  C) (Oral)  Resp 20  Ht 5' 5\" (1.651 m)  Wt 234 lb (106.1 kg)  SpO2 98%  BMI 38.94 kg/m2  Body mass index is 38.94 kg/(m^2).  Head: Normocephalic, atraumatic.  Eyes: Conjunctiva clear, non icteric. PERRLA.  Ears: External ears nl, TM is nl "   Nose: Septum midline, nasal mucosa congested. No discharge.  There is no tenderness over the maxillary sinuses, there is no tenderness over the frontal sinuses  Mouth / Throat: Normal dentition.  No oral lesions. Pharynx mild erythematous, tonsils no exudate/hypertrophy.  Neck: Supple, no enlarged LN, trachea midline.  LUNGS:  CTA B/L, no wheezing or crackles.  CVS : RRR, no murmur, no rub.    SkiN: there is redness and swelling of the nail bed of the r index finger, mainly on the medial part of the nailbed. No pus collection noticed.        ASSESSMENT/PLAN:             1. Acute bronchitis, unspecified organism  Given the symptoms, not improving, and associated with DM, will stat on   - amoxicillin-clavulanate (AUGMENTIN) 875-125 MG per tablet; Take 1 tablet by mouth 2 times daily  Dispense: 20 tablet; Refill: 0    2. Benign prostatic hyperplasia with urinary hesitancy  Refill given   - finasteride (PROSCAR) 5 MG tablet; Take 1 tablet (5 mg) by mouth daily  Dispense: 90 tablet; Refill: 3    3. Abrasion of finger with infection, initial encounter  Avoid chewing nails, also start on   - amoxicillin-clavulanate (AUGMENTIN) 875-125 MG per tablet; Take 1 tablet by mouth 2 times daily  Dispense: 20 tablet; Refill: 0    Follow up in 5 days if symptoms persist, sooner if symptoms worsen or new ones develops, pt may contact us over the phone for any questions or concerns.      Jessica Fowler MD  [facility]

## 2018-01-08 NOTE — MR AVS SNAPSHOT
After Visit Summary   1/8/2018    Steve Morgan    MRN: [id number]           Patient Information     Date Of Birth          1960        Visit Information        Provider Department      1/8/2018 9:15 AM Jessica Fowler MD [facility]        Today's Diagnoses     Acute bronchitis, unspecified organism    -  1    Benign prostatic hyperplasia with urinary hesitancy        Abrasion of finger with infection, initial encounter           Follow-ups after your visit        Your next 10 appointments already scheduled     Mar 14, 2018  8:30 AM CDT   Return Visit with BRIDGET Payne CNP   [facility] ([facility])    [address] [facility] [id number] [phone]              Who to contact     If you have questions or need follow up information about today's clinic visit or your schedule please contact [facility] directly at [phone].  Normal or non-critical lab and imaging results will be communicated to you by MyChart, letter or phone within 4 business days after the clinic has received the results. If you do not hear from us within 7 days, please contact the clinic through payworkshart or phone. If you have a critical or abnormal lab result, we will notify you by phone as soon as possible.  Submit refill requests through [employer] or call your pharmacy and they will forward the refill request to us. Please allow 3 business days for your refill to be completed.          Additional Information About Your Visit        MyChart Information     [employer] gives you secure access to your electronic health record. If you see a primary care provider, you can also send messages to your care team and make appointments. If you have questions, please call your primary care clinic.  If you do not have a primary care provider, please call [phone] and they will assist you.        Care EveryWhere ID     This is your Care  "EveryWhere ID. This could be used by other organizations to access your [place] medical records  [id number]        Your Vitals Were     Pulse Temperature Respirations Height Pulse Oximetry BMI (Body Mass Index)    81 98.7  F (37.1  C) (Oral) 20 5' 5\" (1.651 m) 98% 38.94 kg/m2       Blood Pressure from Last 3 Encounters:   01/08/18 138/84   12/12/17 129/68   12/08/17 138/70    Weight from Last 3 Encounters:   01/08/18 234 lb (106.1 kg)   12/12/17 235 lb 9.6 oz (106.9 kg)   12/08/17 235 lb 1.6 oz (106.6 kg)              Today, you had the following     No orders found for display         Today's Medication Changes          These changes are accurate as of: 1/8/18  9:32 AM.  If you have any questions, ask your nurse or doctor.               Start taking these medicines.        Dose/Directions    amoxicillin-clavulanate 875-125 MG per tablet   Commonly known as:  AUGMENTIN   Used for:  Acute bronchitis, unspecified organism, Abrasion of finger with infection, initial encounter   Started by:  Jessica Fowler MD        Dose:  1 tablet   Take 1 tablet by mouth 2 times daily   Quantity:  20 tablet   Refills:  0            Where to get your medicines      These medications were sent to [facility]/pharmacy #[id number] - [place], [place] - [address] [address].  [address] [facility] [address] [address]     Phone:  [phone]     amoxicillin-clavulanate 875-125 MG per tablet    finasteride 5 MG tablet                Primary Care Provider Office Phone # Fax #    Lisa Pierre PA-C [phone] [address] [facility] [address]        Equal Access to Services     SHELLEY ALMONTE AH: Hadmila Alexander, waheenada luqadaha, qaybta kaalmada sarika, jayson schwartz. So [facility] [phone].    ATENCIÓN: Si habla español, tiene a adler disposición servicios gratuitos de asistencia lingüística. Llame al [phone].    We comply with applicable federal civil rights laws and [place] laws. We do not " discriminate on the basis of race, color, national origin, age, disability, sex, sexual orientation, or gender identity.            Thank you!     Thank you for choosing [facility]  for your care. Our goal is always to provide you with excellent care. Hearing back from our patients is one way we can continue to improve our services. Please take a few minutes to complete the written survey that you may receive in the mail after your visit with us. Thank you!             Your Updated Medication List - Protect others around you: Learn how to safely use, store and throw away your medicines at www.disposemymeds.org.          This list is accurate as of: 1/8/18  9:32 AM.  Always use your most recent med list.                   Brand Name Dispense Instructions for use Diagnosis    ammonium lactate 12 % cream    LAC-HYDRIN    385 g    Apply topically 2 times daily as needed for dry skin    Diabetic polyneuropathy associated with type 2 diabetes mellitus (H), Pre-ulcerative calluses, Pes planus of both feet       amoxicillin-clavulanate 875-125 MG per tablet    AUGMENTIN    20 tablet    Take 1 tablet by mouth 2 times daily    Acute bronchitis, unspecified organism, Abrasion of finger with infection, initial encounter       aspirin 81 MG tablet     100    ONE DAILY        atenolol 25 MG tablet    TENORMIN    90 tablet    Take 1 tablet (25 mg) by mouth every morning    Essential hypertension with goal blood pressure less than 140/90       * blood glucose monitoring lancets     1 Box    Use to test blood sugars 2 times daily or as directed.    Type 2 diabetes, HbA1C goal < 8% (H)       * blood glucose monitoring lancets     100 each    Use to test blood sugar 3 times daily or as directed.    Type 2 diabetes mellitus with diabetic neuropathy, unspecified long term insulin use status (H)       * blood glucose monitoring test strip    ONETOUCH VERIO IQ    100 strip    Use to test blood sugars 2 times daily or as  directed.    Type 2 diabetes, HbA1C goal < 8% (H)       * blood glucose monitoring test strip    KERLINE CONTOUR NEXT    100 strip    Use to test blood sugar 3 times daily or as directed.    Type 2 diabetes mellitus with diabetic neuropathy, unspecified long term insulin use status (H)       buPROPion 300 MG 24 hr tablet    WELLBUTRIN XL    90 tablet    Take 1 tablet (300 mg) by mouth every morning    Excessive anger       CALCIUM 600 + D PO      Take 1 tablet by mouth daily        citalopram 20 MG tablet    celeXA    90 tablet    Take 1 tablet (20 mg) by mouth daily    Excessive anger       ferrous sulfate 325 (65 FE) MG tablet    IRON     Take 1 tablet by mouth daily (with breakfast).        finasteride 5 MG tablet    PROSCAR    90 tablet    Take 1 tablet (5 mg) by mouth daily    Benign prostatic hyperplasia with urinary hesitancy       glipiZIDE 10 MG 24 hr tablet    GLUCOTROL XL    180 tablet    Take 2 tablets (20 mg) by mouth every morning    Type 2 diabetes mellitus with diabetic neuropathy, without long-term current use of insulin (H)       hydrocortisone 0.2 % cream    WESTCORT    45 g    Apply topically 2 times daily as needed Apply sparingly to affected area, BID.    Dermatitis       insulin pen needle 31G X 5 MM    B-D U/F    100 each    Use 1 daily or as directed.    Type 2 diabetes mellitus with diabetic neuropathy, unspecified long term insulin use status (H)       * LANTUS SOLOSTAR 100 UNIT/ML injection   Generic drug:  insulin glargine     15 mL    Inject 18 units SQ daily    Type 2 diabetes mellitus with diabetic neuropathy, unspecified long term insulin use status (H)       * insulin glargine 100 UNIT/ML injection    LANTUS SOLOSTAR    45 mL    18 units qd.  Increase as directed to max dose of 45 units qd.    Type 2 diabetes mellitus with diabetic neuropathy, with long-term current use of insulin (H)       LEVOXYL 175 MCG tablet   Generic drug:  levothyroxine     90 tablet    Take 1 tablet (175 mcg)  by mouth daily    Hypothyroidism due to acquired atrophy of thyroid       losartan 50 MG tablet    COZAAR    90 tablet    TAKE 1 TABLET DAILY    Essential hypertension with goal blood pressure less than 140/90       metFORMIN 1000 MG tablet    GLUCOPHAGE    180 tablet    Take 1 tablet (1,000 mg) by mouth 2 times daily (with meals)    Type 2 diabetes mellitus with diabetic neuropathy, without long-term current use of insulin (H)       Multi-vitamin Tabs tablet   Generic drug:  multivitamin, therapeutic with minerals     30    1 TABLET DAILY        omeprazole-sodium bicarbonate  MG per capsule    ZEGERID     Take 1 capsule by mouth every morning (before breakfast)        * order for DME     1 Units    Equipment being ordered: single cane with rubber foot    Type 2 diabetes, HbA1C goal < 8% (H), Neuropathy in diabetes (H), Unsteady gait       * order for DME     1 Units    Equipment being ordered: four pronged cane    Unsteady gait, Neuropathy in diabetes (H), Type 2 diabetes, HbA1C goal < 8% (H)       simvastatin 20 MG tablet    ZOCOR    90 tablet    Take 1 tablet (20 mg) by mouth At Bedtime    Hyperlipidemia LDL goal <100       VITAMIN B 12 PO      Take 250 mcg by mouth daily    Excessive anger       VITAMIN D (CHOLECALCIFEROL) PO      Take 1,000 Units by mouth daily.        * Notice:  This list has 8 medication(s) that are the same as other medications prescribed for you. Read the directions carefully, and ask your doctor or other care provider to review them with you.

## 2018-01-31 ENCOUNTER — OFFICE VISIT (OUTPATIENT)
Dept: FAMILY MEDICINE | Facility: CLINIC | Age: 58
End: 2018-01-31
Payer: COMMERCIAL

## 2018-01-31 VITALS
OXYGEN SATURATION: 99 % | HEART RATE: 74 BPM | WEIGHT: 243 LBS | HEIGHT: 65 IN | RESPIRATION RATE: 16 BRPM | BODY MASS INDEX: 40.48 KG/M2 | DIASTOLIC BLOOD PRESSURE: 80 MMHG | SYSTOLIC BLOOD PRESSURE: 132 MMHG | TEMPERATURE: 98.2 F

## 2018-01-31 DIAGNOSIS — Z79.4 TYPE 2 DIABETES MELLITUS WITH DIABETIC NEUROPATHY, WITH LONG-TERM CURRENT USE OF INSULIN (H): ICD-10-CM

## 2018-01-31 DIAGNOSIS — R07.89 ATYPICAL CHEST PAIN: ICD-10-CM

## 2018-01-31 DIAGNOSIS — Z01.818 PREOP GENERAL PHYSICAL EXAM: Primary | ICD-10-CM

## 2018-01-31 DIAGNOSIS — E66.01 MORBID OBESITY DUE TO EXCESS CALORIES (H): ICD-10-CM

## 2018-01-31 DIAGNOSIS — E11.40 TYPE 2 DIABETES MELLITUS WITH DIABETIC NEUROPATHY, WITH LONG-TERM CURRENT USE OF INSULIN (H): ICD-10-CM

## 2018-01-31 DIAGNOSIS — I73.9 PERIPHERAL ARTERIAL DISEASE (H): ICD-10-CM

## 2018-01-31 PROCEDURE — 99215 OFFICE O/P EST HI 40 MIN: CPT | Performed by: FAMILY MEDICINE

## 2018-01-31 PROCEDURE — 36415 COLL VENOUS BLD VENIPUNCTURE: CPT | Performed by: FAMILY MEDICINE

## 2018-01-31 PROCEDURE — 93000 ELECTROCARDIOGRAM COMPLETE: CPT | Performed by: FAMILY MEDICINE

## 2018-01-31 PROCEDURE — 80048 BASIC METABOLIC PNL TOTAL CA: CPT | Performed by: FAMILY MEDICINE

## 2018-01-31 NOTE — PROGRESS NOTES
[facility]  [address] [facility] [address]  [phone]  Dept: [phone]    PRE-OP EVALUATION:  Today's date: 2018    Steve Morgan (: 1960) presents for pre-operative evaluation assessment as requested by Dr. Colin.  He requires evaluation and anesthesia risk assessment prior to undergoing surgery/procedure for treatment of both eyes .  Proposed procedure: cataract    Date of Surgery/ Procedure: 18 and 18  Time of Surgery/ Procedure: 8:30  Hospital/Surgical Facility: [facility]  Fax number for surgical facility: [phone]  Primary Physician: Lisa Pierre  Type of Anesthesia Anticipated: General    Patient has a Health Care Directive or Living Will:  NO    Preop Questions 2018   1.  Do you have a history of heart attack, stroke, stent, bypass or surgery on an artery in the head, neck, heart or legs? No   2.  Do you ever have any pain or discomfort in your chest? No   3.  Do you have a history of  Heart Failure? No   4.   Are you troubled by shortness of breath when:  walking on a level surface, or up a slight hill, or at night? No   5.  Do you currently have a cold, bronchitis or other respiratory infection? No   6.  Do you have a cough, shortness of breath, or wheezing? No   7.  Do you sometimes get pains in the calves of your legs when you walk? No   8. Do you or anyone in your family have previous history of blood clots? No   9.  Do you or does anyone in your family have a serious bleeding problem such as prolonged bleeding following surgeries or cuts? No   10. Have you ever had problems with anemia or been told to take iron pills? YES -    11. Have you had any abnormal blood loss such as black, tarry or bloody stools? No   12. Have you ever had a blood transfusion? No   13. Have you or any of your relatives ever had problems with anesthesia? No   14. Do you have sleep apnea, excessive snoring or daytime drowsiness? YES  - does have sleep apnea.   15. Do you have any prosthetic heart valves? No   16. Do you have prosthetic joints? No         HPI:                                                      Brief HPI related to upcoming procedure: cataract surgery in both eyes.      DIABETES - Patient has a longstanding history of DiabetesType Type II . Patient is being treated with oral agents and insulin injections and denies significant side effects. Control has been fair. Complicating factors include but are not limited to: .      HTN                                                                                                       .  HYPERTENSION - Patient has longstanding history of mod-severe HTN , currently denies any symptoms referable to elevated blood pressure. Specifically denies chest pain, palpitations, dyspnea, orthopnea, PND or peripheral edema. Blood pressure readings have been in normal range. Current medication regimen is as listed below. Patient denies any side effects of medication.                                                                                                                                                                                          .  HYPOTHYROIDISM - Patient has a longstanding history of chronic Hypothyroidism. Patient has been doing well, noting no tremor, insomnia, hair loss or changes in skin texture. Last TSH value of 0.9. Continues to take medications as directed, without adverse reactions or side effects.                                                                                                                                                                                                                        .  SLEEP PROBLEM - Patient has a longstanding history of sleep apnea of moderate severity.uses sleep machine.                                                                                                                                       .    MEDICAL HISTORY:                                                       Patient Active Problem List    Diagnosis Date Noted     Benign prostatic hyperplasia with urinary hesitancy 12/12/2017     Priority: Medium     Cellulitis and abscess of trunk 06/27/2017     Priority: Medium     Depression 06/26/2017     Priority: Medium     Type 2 diabetes mellitus with diabetic neuropathy (H) 10/12/2015     Priority: Medium     Chronic hyponatremia 08/26/2015     Priority: Medium     Diagnosis updated by automated process. Provider to review and confirm.       Vitamin B12 deficiency without anemia 02/06/2015     Priority: Medium     Diagnosis updated by automated process. Provider to review and confirm.       Hyponatremia 02/06/2015     Priority: Medium     DAMIÁN (obstructive sleep apnea) 12/09/2014     Priority: Medium     Unsteady gait 10/22/2014     Priority: Medium     Health Care Home 07/31/2014     Priority: Medium     State Tier Level:   Status:  Declined  Care Coordinator:  Nathalie Bonilla, [facility], [phone].    No active care coordination at this time.           Generalized muscle weakness 02/18/2014     Priority: Medium     Peripheral neuropathy 01/21/2013     Priority: Medium     Excessive anger 01/21/2013     Priority: Medium     Tremor 08/18/2011     Priority: Medium     Peripheral arterial disease (H) 04/05/2011     Priority: Medium     Hypertension goal BP (blood pressure) < 140/90 03/17/2011     Priority: Medium     HYPERLIPIDEMIA LDL GOAL <100 10/31/2010     Priority: Medium     Anemia 01/12/2010     Priority: Medium     Morbid obesity due to excess calories (H) 01/12/2010     Priority: Medium     Sleep apnea 01/12/2010     Priority: Medium     Neuropathy in diabetes (H) 01/12/2010     Priority: Medium     Hypothyroidism 01/12/2010     Priority: Medium      Past Medical History:   Diagnosis Date     Cellulitis and abscess of trunk 6/27/2017     Depression      Hyperlipidemia LDL goal <100 10/31/2010     Hypertension goal BP (blood  pressure) < 140/90 3/17/2011     Hypothyroidism 1/12/2010     Morbid obesity due to excess calories (H) 1/12/2010     Neuropathy in diabetes (H) 1/12/2010     Obesity 1/12/2010     Sleep apnea 1/12/2010    CPAP     Type 2 diabetes, HbA1C goal < 8% (H) 3/8/2011     Past Surgical History:   Procedure Laterality Date     COLONOSCOPY       GENITOURINARY SURGERY      surg for undescended testicle     REPAIR HAMMER TOE BILATERAL  5/16/2013    Procedure: REPAIR HAMMER TOE BILATERAL;  Flexor Tenotomy Toes 2,3,4,5 Bilateral Feet;  Surgeon: Saad Bangura DPM;  Location: [facility] OR     Current Outpatient Prescriptions   Medication Sig Dispense Refill     finasteride (PROSCAR) 5 MG tablet Take 1 tablet (5 mg) by mouth daily 90 tablet 3     LEVOXYL 175 MCG tablet Take 1 tablet (175 mcg) by mouth daily 90 tablet 1     citalopram (CELEXA) 20 MG tablet Take 1 tablet (20 mg) by mouth daily 90 tablet 1     atenolol (TENORMIN) 25 MG tablet Take 1 tablet (25 mg) by mouth every morning 90 tablet 1     buPROPion (WELLBUTRIN XL) 300 MG 24 hr tablet Take 1 tablet (300 mg) by mouth every morning 90 tablet 1     insulin glargine (LANTUS SOLOSTAR) 100 UNIT/ML injection 18 units qd.  Increase as directed to max dose of 45 units qd. 45 mL 1     glipiZIDE (GLUCOTROL XL) 10 MG 24 hr tablet Take 2 tablets (20 mg) by mouth every morning 180 tablet 1     insulin pen needle (B-D U/F) 31G X 5 MM Use 1 daily or as directed. 100 each prn     metFORMIN (GLUCOPHAGE) 1000 MG tablet Take 1 tablet (1,000 mg) by mouth 2 times daily (with meals) 180 tablet 1     hydrocortisone (WESTCORT) 0.2 % cream Apply topically 2 times daily as needed Apply sparingly to affected area, BID. 45 g 1     insulin glargine (LANTUS SOLOSTAR) 100 UNIT/ML injection Inject 18 units SQ daily 15 mL 3     blood glucose monitoring (KERLINE CONTOUR NEXT) test strip Use to test blood sugar 3 times daily or as directed. 100 strip 11     blood glucose monitoring (KERLINE MICROLET) lancets Use  to test blood sugar 3 times daily or as directed. 100 each 11     losartan (COZAAR) 50 MG tablet TAKE 1 TABLET DAILY 90 tablet 2     ammonium lactate (LAC-HYDRIN) 12 % cream Apply topically 2 times daily as needed for dry skin 385 g 3     simvastatin (ZOCOR) 20 MG tablet Take 1 tablet (20 mg) by mouth At Bedtime 90 tablet 3     omeprazole-sodium bicarbonate (ZEGERID)  MG per capsule Take 1 capsule by mouth every morning (before breakfast)       blood glucose (ONE TOUCH VERIO IQ) test strip Use to test blood sugars 2 times daily or as directed. 100 strip 0     blood glucose monitoring (ONE TOUCH DELICA) lancets Use to test blood sugars 2 times daily or as directed. 1 Box prn     Cyanocobalamin (VITAMIN B 12 PO) Take 250 mcg by mouth daily       Calcium Carb-Cholecalciferol (CALCIUM 600 + D PO) Take 1 tablet by mouth daily       ORDER FOR DME Equipment being ordered: four pronged cane 1 Units 0     ORDER FOR DME Equipment being ordered: single cane with rubber foot 1 Units 0     VITAMIN D, CHOLECALCIFEROL, PO Take 1,000 Units by mouth daily.       ferrous sulfate 325 (65 FE) MG tablet Take 1 tablet by mouth daily (with breakfast).       ASPIRIN 81 MG OR TABS ONE DAILY 100 3     MULTI-VITAMIN OR TABS 1 TABLET DAILY 30 0     OTC products: Aspirin    No Known Allergies   Latex Allergy: NO    Social History   Substance Use Topics     Smoking status: Never Smoker     Smokeless tobacco: Never Used     Alcohol use Yes      Comment: occ     History   Drug Use No       Chest Pain      Onset: 7 to 8 years    Description (location/character/radiation/duration): chest pain in the Lt side of the chest, into the stomach, last for few seconds.    Intensity:  mild    Accompanying signs and symptoms:        Shortness of breath: YES- when he walks.       Sweating: no        Nausea/vomitting: no        Palpitations: no        Other (fevers/chills/cough/heartburn/lightheadedness): no     History (similar episodes/previous  "evaluation): yes, pt has had these symptoms for many years, on and off.    Precipitating or alleviating factors:       Worse with exertion: no        Worse with breathing: no        Related to eating: YES- sometimes, he feels like a heart burn feeling sometimes.       Better with burping: no     Therapies tried and outcome: None       REVIEW OF SYSTEMS:                                                    C: NEGATIVE for fever, chills, change in weight  E/M: NEGATIVE for ear, mouth and throat problems  R: NEGATIVE for significant cough or SOB  CV: positive for chest pain in the Lt side of the chest.  Positive for swelling in the legs.  GI: NEGATIVE for nausea, abdominal pain, heartburn, or change in bowel habits    EXAM:                                                    /80 (BP Location: Right arm, Patient Position: Chair, Cuff Size: Adult Large)  Pulse 74  Temp 98.2  F (36.8  C) (Oral)  Resp 16  Ht 5' 5\" (1.651 m)  Wt 243 lb (110.2 kg)  SpO2 99%  BMI 40.44 kg/m2  GENERAL APPEARANCE: healthy, alert and no distress  HENT: ear canals and TM's normal and nose and mouth without ulcers or lesions  RESP: lungs clear to auscultation - no rales, rhonchi or wheezes  CV: regular rate and rhythm, normal S1 S2, no S3 or S4 and no murmur, click or rub   ABDOMEN: soft, nontender, no HSM or masses and bowel sounds normal  NEURO: Normal strength and tone, sensory exam grossly normal, mentation intact and speech normal    DIAGNOSTICS:                                                      EKG: appears normal, NSR, normal axis, normal intervals, no acute ST/T changes c/w ischemia, no LVH by voltage criteria  Labs Resulted Today:   Results for orders placed or performed in visit on 12/12/17   PSA, screen   Result Value Ref Range    PSA 0.09 0 - 4 ug/L     Labs Drawn and in Process:   Unresulted Labs Ordered in the Past 30 Days of this Admission     Date and Time Order Name Status Description    1/31/2018 0941 BASIC METABOLIC " PANEL In process           Recent Labs   Lab Test  12/08/17   1016  09/07/17   0937  05/17/17   1002   05/19/16   0804   05/07/13   0802   HGB   --    --   12.3*   --    --    --   11.6*   PLT   --    --   349   --    --    --   292   NA   --    --   131*   --   129*   < >   --    POTASSIUM   --    --   4.6   --   4.8   < >   --    CR   --    --   1.02   --   1.17   < >   --    A1C  9.0*  8.5*  10.0*   < >  7.5*   < >  7.7*    < > = values in this interval not displayed.              IMPRESSION:                                                    Reason for surgery/procedure: cataract surgery  Diagnosis/reason for consult: pre op    The proposed surgical procedure is considered LOW risk.    REVISED CARDIAC RISK INDEX  The patient has the following serious cardiovascular risks for perioperative complications such as (MI, PE, VFib and 3  AV Block):  Diabetes Mellitus (on Insulin)  INTERPRETATION: 0 risks: Class I (very low risk - 0.4% complication rate)    The patient has the following additional risks for perioperative complications:  No identified additional risks      ICD-10-CM    1. Preop general physical exam Z01.818      (Z01.818) Preop general physical exam  (primary encounter diagnosis)  Comment: Plan: Basic metabolic panel            (R07.89) Atypical chest pain  Comment: I don't think this pain is cardiac related, but given his hx of DM and PAD, I will schedule patient to stress echo  Plan: EKG 12-lead complete w/read - Clinics,         Dobutamine Stress Echocardiogram        \    (E66.01) Morbid obesity due to excess calories (H)  Comment: Today I counseled the patient about diet, regular exercise and weight loss planning.      (I73.9) Peripheral arterial disease (H)  Comment:   Plan:     (E11.40,  Z79.4) Type 2 diabetes mellitus with diabetic neuropathy, with long-term current use of insulin (H)  Comment: not controlled,   Plan: pt to follow up with Endocrinology      RECOMMENDATIONS:                                                         Cardiovascular Risk  Patient is already on a Beta Blocker. Continue Betablocker therapy after surgery, using Beta blocker order set as necessary for NPO status.      Obstructive Sleep Apnea (or suspected sleep apnea)  Patient is to bring their home CPAP with them on the day of surgery        Diabetes Medication Use  -----Hold usual  oral diabetic meds (e.g. Metformin, Actos, Glipizide) while NPO.   -----Take 80% of long acting insulin (e.g. Lantus, NPH) while NPO (fasting)      Anticoagulant or Antiplatelet Medication Use  ASPIRIN: Bleeding risk is low for this procedure and aspirin 81 mg daily should be continued in the perioperative period        Although pt did have chest pain on and off for many years, I doubt this pain is cardiac in nature, and given the low risk for this procedure:  APPROVAL GIVEN to proceed with proposed procedure, without further diagnostic evaluation       Signed Electronically by: Jessica Fowler MD    Copy of this evaluation report is provided to requesting physician.    [facility] Preop Guidelines      Addendum :  Noticed that Caio blood sodium is low, repeated one was at 128, corrected with glucose level was 131, pt does have hx of chronic hyponatremia.  I think pt can proceed with the proposed procedure at this time, advised to follow up with Endocrinology for better control on his diabetes.

## 2018-01-31 NOTE — MR AVS SNAPSHOT
After Visit Summary   1/31/2018    Steve Morgan    MRN: [id number]           Patient Information     Date Of Birth          1960        Visit Information        Provider Department      1/31/2018 9:00 AM Jessica Fowler MD [facility]        Today's Diagnoses     Preop general physical exam    -  1    Atypical chest pain          Care Instructions      Before Your Surgery      Call your surgeon if there is any change in your health. This includes signs of a cold or flu (such as a sore throat, runny nose, cough, rash or fever).    Do not smoke, drink alcohol or take over the counter medicine (unless your surgeon or primary care doctor tells you to) for the 24 hours before and after surgery.    If you take prescribed drugs: Follow your doctor s orders about which medicines to take and which to stop until after surgery.    Eating and drinking prior to surgery: follow the instructions from your surgeon    Take a shower or bath the night before surgery. Use the soap your surgeon gave you to gently clean your skin. If you do not have soap from your surgeon, use your regular soap. Do not shave or scrub the surgery site.  Wear clean pajamas and have clean sheets on your bed.           Follow-ups after your visit        Your next 10 appointments already scheduled     Mar 14, 2018  8:30 AM CDT   Return Visit with BRIDGET Payne CNP   [facility] ([facility]    [address]  [facility] [address] [phone]              Future tests that were ordered for you today     Open Future Orders        Priority Expected Expires Ordered    Dobutamine Stress Echocardiogram Routine  1/31/2019 1/31/2018            Who to contact     If you have questions or need follow up information about today's clinic visit or your schedule please contact [facility] directly at [phone].  Normal or non-critical lab and imaging results  "will be communicated to you by MyChart, letter or phone within 4 business days after the clinic has received the results. If you do not hear from us within 7 days, please contact the clinic through [employer] or phone. If you have a critical or abnormal lab result, we will notify you by phone as soon as possible.  Submit refill requests through [employer] or call your pharmacy and they will forward the refill request to us. Please allow 3 business days for your refill to be completed.          Additional Information About Your Visit        [employer] Information     [employer] gives you secure access to your electronic health record. If you see a primary care provider, you can also send messages to your care team and make appointments. If you have questions, please call your primary care clinic.  If you do not have a primary care provider, please call [phone] and they will assist you.        Care EveryWhere ID     This is your Care EveryWhere ID. This could be used by other organizations to access your [place] medical records  [id number]        Your Vitals Were     Pulse Temperature Respirations Height Pulse Oximetry BMI (Body Mass Index)    74 98.2  F (36.8  C) (Oral) 16 5' 5\" (1.651 m) 99% 40.44 kg/m2       Blood Pressure from Last 3 Encounters:   01/31/18 132/80   01/08/18 138/84   12/12/17 129/68    Weight from Last 3 Encounters:   01/31/18 243 lb (110.2 kg)   01/08/18 234 lb (106.1 kg)   12/12/17 235 lb 9.6 oz (106.9 kg)              We Performed the Following     Basic metabolic panel     EKG 12-lead complete w/read - Clinics        Primary Care Provider Office Phone # Fax #    Lisa Pierre PA-C [phone] [address] [facility] [address]        Equal Access to Services     KE ALMONTE : Hadii ana Alexander, wajovan rodriguez, qaybta kaalgood baum, jayson schwartz. So [facility] [phone].    ATENCIÓN: Si habla español, tiene a adler disposición " servicios gratuitos de asistencia lingüística. Jean bates [phone].    We comply with applicable federal civil rights laws and [place] laws. We do not discriminate on the basis of race, color, national origin, age, disability, sex, sexual orientation, or gender identity.            Thank you!     Thank you for choosing [facility]  for your care. Our goal is always to provide you with excellent care. Hearing back from our patients is one way we can continue to improve our services. Please take a few minutes to complete the written survey that you may receive in the mail after your visit with us. Thank you!             Your Updated Medication List - Protect others around you: Learn how to safely use, store and throw away your medicines at www.disposemymeds.org.          This list is accurate as of 1/31/18  9:41 AM.  Always use your most recent med list.                   Brand Name Dispense Instructions for use Diagnosis    ammonium lactate 12 % cream    LAC-HYDRIN    385 g    Apply topically 2 times daily as needed for dry skin    Diabetic polyneuropathy associated with type 2 diabetes mellitus (H), Pre-ulcerative calluses, Pes planus of both feet       aspirin 81 MG tablet     100    ONE DAILY        atenolol 25 MG tablet    TENORMIN    90 tablet    Take 1 tablet (25 mg) by mouth every morning    Essential hypertension with goal blood pressure less than 140/90       * blood glucose monitoring lancets     1 Box    Use to test blood sugars 2 times daily or as directed.    Type 2 diabetes, HbA1C goal < 8% (H)       * blood glucose monitoring lancets     100 each    Use to test blood sugar 3 times daily or as directed.    Type 2 diabetes mellitus with diabetic neuropathy, unspecified long term insulin use status (H)       * blood glucose monitoring test strip    ONETOUCH VERIO IQ    100 strip    Use to test blood sugars 2 times daily or as directed.    Type 2 diabetes, HbA1C goal < 8% (H)       *  blood glucose monitoring test strip    KERLINE CONTOUR NEXT    100 strip    Use to test blood sugar 3 times daily or as directed.    Type 2 diabetes mellitus with diabetic neuropathy, unspecified long term insulin use status (H)       buPROPion 300 MG 24 hr tablet    WELLBUTRIN XL    90 tablet    Take 1 tablet (300 mg) by mouth every morning    Excessive anger       CALCIUM 600 + D PO      Take 1 tablet by mouth daily        citalopram 20 MG tablet    celeXA    90 tablet    Take 1 tablet (20 mg) by mouth daily    Excessive anger       ferrous sulfate 325 (65 FE) MG tablet    IRON     Take 1 tablet by mouth daily (with breakfast).        finasteride 5 MG tablet    PROSCAR    90 tablet    Take 1 tablet (5 mg) by mouth daily    Benign prostatic hyperplasia with urinary hesitancy       glipiZIDE 10 MG 24 hr tablet    GLUCOTROL XL    180 tablet    Take 2 tablets (20 mg) by mouth every morning    Type 2 diabetes mellitus with diabetic neuropathy, without long-term current use of insulin (H)       hydrocortisone 0.2 % cream    WESTCORT    45 g    Apply topically 2 times daily as needed Apply sparingly to affected area, BID.    Dermatitis       insulin pen needle 31G X 5 MM    B-D U/F    100 each    Use 1 daily or as directed.    Type 2 diabetes mellitus with diabetic neuropathy, unspecified long term insulin use status (H)       * LANTUS SOLOSTAR 100 UNIT/ML injection   Generic drug:  insulin glargine     15 mL    Inject 18 units SQ daily    Type 2 diabetes mellitus with diabetic neuropathy, unspecified long term insulin use status (H)       * insulin glargine 100 UNIT/ML injection    LANTUS SOLOSTAR    45 mL    18 units qd.  Increase as directed to max dose of 45 units qd.    Type 2 diabetes mellitus with diabetic neuropathy, with long-term current use of insulin (H)       LEVOXYL 175 MCG tablet   Generic drug:  levothyroxine     90 tablet    Take 1 tablet (175 mcg) by mouth daily    Hypothyroidism due to acquired atrophy  of thyroid       losartan 50 MG tablet    COZAAR    90 tablet    TAKE 1 TABLET DAILY    Essential hypertension with goal blood pressure less than 140/90       metFORMIN 1000 MG tablet    GLUCOPHAGE    180 tablet    Take 1 tablet (1,000 mg) by mouth 2 times daily (with meals)    Type 2 diabetes mellitus with diabetic neuropathy, without long-term current use of insulin (H)       Multi-vitamin Tabs tablet   Generic drug:  multivitamin, therapeutic with minerals     30    1 TABLET DAILY        omeprazole-sodium bicarbonate  MG per capsule    ZEGERID     Take 1 capsule by mouth every morning (before breakfast)        * order for DME     1 Units    Equipment being ordered: single cane with rubber foot    Type 2 diabetes, HbA1C goal < 8% (H), Neuropathy in diabetes (H), Unsteady gait       * order for DME     1 Units    Equipment being ordered: four pronged cane    Unsteady gait, Neuropathy in diabetes (H), Type 2 diabetes, HbA1C goal < 8% (H)       simvastatin 20 MG tablet    ZOCOR    90 tablet    Take 1 tablet (20 mg) by mouth At Bedtime    Hyperlipidemia LDL goal <100       VITAMIN B 12 PO      Take 250 mcg by mouth daily    Excessive anger       VITAMIN D (CHOLECALCIFEROL) PO      Take 1,000 Units by mouth daily.        * Notice:  This list has 8 medication(s) that are the same as other medications prescribed for you. Read the directions carefully, and ask your doctor or other care provider to review them with you.

## 2018-01-31 NOTE — LETTER
January 31, 2018      Steve Morgan  [address] [facility] [address]        Dear ,    EKG is showing normal sinus, no ST/T changes. If you have any questions or concerns, please call the clinic at the number listed above.       Sincerely,        Jessica Fowler MD

## 2018-02-01 ENCOUNTER — TELEPHONE (OUTPATIENT)
Dept: FAMILY MEDICINE | Facility: CLINIC | Age: 58
End: 2018-02-01

## 2018-02-01 DIAGNOSIS — E87.1 HYPONATREMIA: ICD-10-CM

## 2018-02-01 DIAGNOSIS — E87.1 HYPONATREMIA: Primary | ICD-10-CM

## 2018-02-01 LAB
ANION GAP SERPL CALCULATED.3IONS-SCNC: 2 MMOL/L (ref 3–14)
ANION GAP SERPL CALCULATED.3IONS-SCNC: 7 MMOL/L (ref 3–14)
BUN SERPL-MCNC: 13 MG/DL (ref 7–30)
BUN SERPL-MCNC: 13 MG/DL (ref 7–30)
CALCIUM SERPL-MCNC: 8.5 MG/DL (ref 8.5–10.1)
CALCIUM SERPL-MCNC: 8.6 MG/DL (ref 8.5–10.1)
CHLORIDE SERPL-SCNC: 92 MMOL/L (ref 94–109)
CHLORIDE SERPL-SCNC: 93 MMOL/L (ref 94–109)
CO2 SERPL-SCNC: 28 MMOL/L (ref 20–32)
CO2 SERPL-SCNC: 31 MMOL/L (ref 20–32)
CREAT SERPL-MCNC: 0.92 MG/DL (ref 0.66–1.25)
CREAT SERPL-MCNC: 0.97 MG/DL (ref 0.66–1.25)
GFR SERPL CREATININE-BSD FRML MDRD: 79 ML/MIN/1.7M2
GFR SERPL CREATININE-BSD FRML MDRD: 84 ML/MIN/1.7M2
GLUCOSE SERPL-MCNC: 226 MG/DL (ref 70–99)
GLUCOSE SERPL-MCNC: 274 MG/DL (ref 70–99)
POTASSIUM SERPL-SCNC: 4.7 MMOL/L (ref 3.4–5.3)
POTASSIUM SERPL-SCNC: 5 MMOL/L (ref 3.4–5.3)
SODIUM SERPL-SCNC: 125 MMOL/L (ref 133–144)
SODIUM SERPL-SCNC: 128 MMOL/L (ref 133–144)

## 2018-02-01 PROCEDURE — 80048 BASIC METABOLIC PNL TOTAL CA: CPT | Performed by: FAMILY MEDICINE

## 2018-02-01 PROCEDURE — 36415 COLL VENOUS BLD VENIPUNCTURE: CPT | Performed by: FAMILY MEDICINE

## 2018-02-01 ASSESSMENT — PATIENT HEALTH QUESTIONNAIRE - PHQ9: SUM OF ALL RESPONSES TO PHQ QUESTIONS 1-9: 4

## 2018-02-01 NOTE — TELEPHONE ENCOUNTER
Pt calling regarding lab results, see that Na is 128  Pt states that 128-130 is considered normal for him    Schedule for surgery on Monday, Feb 5 at 8 am    Need ok to proceed with surgery  Will need pre op physical noted he is cleared and faxed to surgery center

## 2018-02-01 NOTE — TELEPHONE ENCOUNTER
Please call, sodium level is back to very low again, if he has symptoms like dizziness, nausea, sever fatigue, then ER.  If no symptoms, please come in today to recheck , will do it stat (please advise the staff the lab to do that)  Increase salt intake please.

## 2018-02-01 NOTE — TELEPHONE ENCOUNTER
Pt notified he is clear for surgery,  Already has appt set up with Diana Butler for 3-14-18    Laura Pizarro RN, BS  Clinical Nurse Triage.

## 2018-02-01 NOTE — TELEPHONE ENCOUNTER
Patient calling back.  Advised of below.  Not having any symptoms.  Is out of town today.  Could leave early and come back early.  Is just going to leave and come back now.  Put on lab schedule for 11:30 am with comment to send stat.  Printed copy of this for lab.  Daisy Bradley RN

## 2018-02-01 NOTE — TELEPHONE ENCOUNTER
Sodium level is much better, and the corrected sodium with his elevated blood sugar is 131.  To be honest I'm more worried about his uncontrolled blood sugar comparing to his sodium, so I recommend a follow up with Endo within 1 month or so.  And in regards to his surgery, he can proceed.  Jessica Fowler MD  [facility]  [phone]

## 2018-02-06 ENCOUNTER — OFFICE VISIT (OUTPATIENT)
Dept: ENDOCRINOLOGY | Facility: CLINIC | Age: 58
End: 2018-02-06
Payer: COMMERCIAL

## 2018-02-06 ENCOUNTER — ALLIED HEALTH/NURSE VISIT (OUTPATIENT)
Dept: EDUCATION SERVICES | Facility: CLINIC | Age: 58
End: 2018-02-06
Payer: COMMERCIAL

## 2018-02-06 VITALS
BODY MASS INDEX: 40.24 KG/M2 | DIASTOLIC BLOOD PRESSURE: 78 MMHG | TEMPERATURE: 97.8 F | SYSTOLIC BLOOD PRESSURE: 136 MMHG | WEIGHT: 241.8 LBS | HEART RATE: 76 BPM

## 2018-02-06 DIAGNOSIS — Z79.4 TYPE 2 DIABETES MELLITUS WITH DIABETIC NEUROPATHY, WITH LONG-TERM CURRENT USE OF INSULIN (H): Primary | ICD-10-CM

## 2018-02-06 DIAGNOSIS — E11.40 TYPE 2 DIABETES MELLITUS WITH DIABETIC NEUROPATHY, WITH LONG-TERM CURRENT USE OF INSULIN (H): Primary | ICD-10-CM

## 2018-02-06 DIAGNOSIS — E03.4 HYPOTHYROIDISM DUE TO ACQUIRED ATROPHY OF THYROID: Primary | ICD-10-CM

## 2018-02-06 PROCEDURE — 99214 OFFICE O/P EST MOD 30 MIN: CPT | Performed by: CLINICAL NURSE SPECIALIST

## 2018-02-06 PROCEDURE — 95250 CONT GLUC MNTR PHYS/QHP EQP: CPT

## 2018-02-06 NOTE — MR AVS SNAPSHOT
After Visit Summary   2/6/2018    Steve Morgan    MRN: [id number]           Patient Information     Date Of Birth          1960        Visit Information        Provider Department      2/6/2018 12:30 PM Diana Butler APRN [facility]        Today's Diagnoses     Hypothyroidism due to acquired atrophy of thyroid    -  1    Uncontrolled type 2 diabetes with neuropathy (H)           Follow-ups after your visit        Additional Services     DIABETES EDUCATOR REFERRAL       DIABETES SELF MANAGEMENT TRAINING (DSMT)      Your provider has referred you to Diabetes Education: FMG: Diabetes Education - All [facility] [phone]   [email or web address]     If an urgent visit is needed or A1C is above 12, Care Team to call the Diabetes  Education Team at [phone] or send an In Basket message to the Diabetes Education Pool (P DIAB ED-PATIENT CARE).    A  will call you to make your appointment. If it has been more than 3 business days since your referral was placed, please call the above phone number to schedule.    Type of training and number of hours: Previous Diagnosis: Follow-up DSMT - 2 hours.    Medicare covers: 10 hours of initial DSMT in 12 month period from the time of first visit, plus 2 hours of follow-up DSMT annually, and additional hours as requested for insulin training.    Diabetes Type: Type 2 - On Insulin             Diabetes Co-Morbidities: none               A1C Goal:  <7.0       A1C is: Lab Results       Component                Value               Date                       A1C                      9.0                 12/08/2017              Diabetes Education Topics: Diagnostic Continuous Glucose Monitoring     Special Educational Needs Requiring Individual DSMT: None       MEDICAL NUTRITION THERAPY (MNT) for Diabetes    Medical Nutrition Therapy with a Registered Dietitian can be provided  in coordination with Diabetes Self-Management Training to assist in achieving optimal diabetes management.    MNT Type and Hours: Do not initiate MNT at this time.                       Medicare will cover: 3 hours initial MNT in 12 month period after first visit, plus 2 hours of follow-up MNT annually    Please be aware that coverage of these services is subject to the terms and limitations of your health insurance plan.  Call member services at your health plan to determine Diabetes Self-Management Training (Codes  &amp; ) and Medical Nutrition Therapy (Codes 20303 & 39084) benefits and ask which blood glucose monitor brands are covered by your plan.  Please bring the following with you to your appointment:    (1)  List of current medications   (2)  List of Blood Glucose Monitor brands that are covered by your insurance plan  (3)  Blood Glucose Monitor and log book  (4)   Food records for the 3 days prior to your visit    The Certified Diabetes Educator may make diabetes medication adjustments per the CDE Protocol and Collaborative Practice Agreement.                  Your next 10 appointments already scheduled     Feb 12, 2018  1:30 PM CST   Diabetic Education with Lucía Chávez   [facility] ([facility])    [address]  [facility] [address]   [phone]            Feb 22, 2018 10:30 AM CST   Diabetic Education with Megan Case RN   [facility] ([facility])    [address]  [facility] [address]   [phone]            May 08, 2018 11:30 AM CDT   Return Visit with BRIDGET Payne CNP   [facility] ([facility]    [address]  [facility] [address]   [phone]              Future tests that were ordered for you today     Open Future Orders        Priority Expected Expires Ordered    GLUCOSE MONITORING CONTINUOUS, >=72  HR Routine  2/6/2019 2/6/2018            Who to contact     If you have questions or need follow up information about today's clinic visit or your schedule please contact [facility] directly at [phone].  Normal or non-critical lab and imaging results will be communicated to you by MyChart, letter or phone within 4 business days after the clinic has received the results. If you do not hear from us within 7 days, please contact the clinic through MyChart or phone. If you have a critical or abnormal lab result, we will notify you by phone as soon as possible.  Submit refill requests through [employer] or call your pharmacy and they will forward the refill request to us. Please allow 3 business days for your refill to be completed.          Additional Information About Your Visit        NLP [employer] Information     [employer] gives you secure access to your electronic health record. If you see a primary care provider, you can also send messages to your care team and make appointments. If you have questions, please call your primary care clinic.  If you do not have a primary care provider, please call [phone] and they will assist you.        Care EveryWhere ID     This is your Care EveryWhere ID. This could be used by other organizations to access your [place] medical records  [id number]        Your Vitals Were     Pulse Temperature BMI (Body Mass Index)             76 97.8  F (36.6  C) (Oral) 40.24 kg/m2          Blood Pressure from Last 3 Encounters:   02/06/18 154/72   01/31/18 132/80   01/08/18 138/84    Weight from Last 3 Encounters:   02/06/18 241 lb 12.8 oz (109.7 kg)   01/31/18 243 lb (110.2 kg)   01/08/18 234 lb (106.1 kg)              We Performed the Following     DIABETES EDUCATOR REFERRAL     T4 FREE     TSH        Primary Care Provider Office Phone # Fax #    Lisa Pierre PA-C [phone] [address]  [facility] [address]        Equal Access to Services      [facility] : Hadii aad ku martinez Alexander, waaxda luqadaha, qaybta kaalmada adeariane, jayson manuel sumitkaren bustamante vikyshane gimenez . So [facility] [phone].    ATENCIÓN: Si habla español, tiene a adler disposición servicios gratuitos de asistencia lingüística. Llame al [phone].    We comply with applicable federal civil rights laws and [place] laws. We do not discriminate on the basis of race, color, national origin, age, disability, sex, sexual orientation, or gender identity.            Thank you!     Thank you for choosing [facility]  for your care. Our goal is always to provide you with excellent care. Hearing back from our patients is one way we can continue to improve our services. Please take a few minutes to complete the written survey that you may receive in the mail after your visit with us. Thank you!             Your Updated Medication List - Protect others around you: Learn how to safely use, store and throw away your medicines at www.disposemymeds.org.          This list is accurate as of 2/6/18  1:05 PM.  Always use your most recent med list.                   Brand Name Dispense Instructions for use Diagnosis    ammonium lactate 12 % cream    LAC-HYDRIN    385 g    Apply topically 2 times daily as needed for dry skin    Diabetic polyneuropathy associated with type 2 diabetes mellitus (H), Pre-ulcerative calluses, Pes planus of both feet       aspirin 81 MG tablet     100    ONE DAILY        atenolol 25 MG tablet    TENORMIN    90 tablet    Take 1 tablet (25 mg) by mouth every morning    Essential hypertension with goal blood pressure less than 140/90       * blood glucose monitoring lancets     1 Box    Use to test blood sugars 2 times daily or as directed.    Type 2 diabetes, HbA1C goal < 8% (H)       * blood glucose monitoring lancets     100 each    Use to test blood sugar 3 times daily or as directed.    Type 2 diabetes mellitus with diabetic neuropathy, unspecified long term  insulin use status (H)       * blood glucose monitoring test strip    ONETOUCH VERIO IQ    100 strip    Use to test blood sugars 2 times daily or as directed.    Type 2 diabetes, HbA1C goal < 8% (H)       * blood glucose monitoring test strip    KERLINE CONTOUR NEXT    100 strip    Use to test blood sugar 3 times daily or as directed.    Type 2 diabetes mellitus with diabetic neuropathy, unspecified long term insulin use status (H)       buPROPion 300 MG 24 hr tablet    WELLBUTRIN XL    90 tablet    Take 1 tablet (300 mg) by mouth every morning    Excessive anger       CALCIUM 600 + D PO      Take 1 tablet by mouth daily        citalopram 20 MG tablet    celeXA    90 tablet    Take 1 tablet (20 mg) by mouth daily    Excessive anger       ferrous sulfate 325 (65 FE) MG tablet    IRON     Take 1 tablet by mouth daily (with breakfast).        finasteride 5 MG tablet    PROSCAR    90 tablet    Take 1 tablet (5 mg) by mouth daily    Benign prostatic hyperplasia with urinary hesitancy       glipiZIDE 10 MG 24 hr tablet    GLUCOTROL XL    180 tablet    Take 2 tablets (20 mg) by mouth every morning    Type 2 diabetes mellitus with diabetic neuropathy, without long-term current use of insulin (H)       hydrocortisone 0.2 % cream    WESTCORT    45 g    Apply topically 2 times daily as needed Apply sparingly to affected area, BID.    Dermatitis       insulin pen needle 31G X 5 MM    B-D U/F    100 each    Use 1 daily or as directed.    Type 2 diabetes mellitus with diabetic neuropathy, unspecified long term insulin use status (H)       * LANTUS SOLOSTAR 100 UNIT/ML injection   Generic drug:  insulin glargine     15 mL    Inject 18 units SQ daily    Type 2 diabetes mellitus with diabetic neuropathy, unspecified long term insulin use status (H)       * insulin glargine 100 UNIT/ML injection    LANTUS SOLOSTAR    45 mL    18 units qd.  Increase as directed to max dose of 45 units qd.    Type 2 diabetes mellitus with diabetic  neuropathy, with long-term current use of insulin (H)       LEVOXYL 175 MCG tablet   Generic drug:  levothyroxine     90 tablet    Take 1 tablet (175 mcg) by mouth daily    Hypothyroidism due to acquired atrophy of thyroid       losartan 50 MG tablet    COZAAR    90 tablet    TAKE 1 TABLET DAILY    Essential hypertension with goal blood pressure less than 140/90       metFORMIN 1000 MG tablet    GLUCOPHAGE    180 tablet    Take 1 tablet (1,000 mg) by mouth 2 times daily (with meals)    Type 2 diabetes mellitus with diabetic neuropathy, without long-term current use of insulin (H)       Multi-vitamin Tabs tablet   Generic drug:  multivitamin, therapeutic with minerals     30    1 TABLET DAILY        omeprazole-sodium bicarbonate  MG per capsule    ZEGERID     Take 1 capsule by mouth every morning (before breakfast)        * order for DME     1 Units    Equipment being ordered: single cane with rubber foot    Type 2 diabetes, HbA1C goal < 8% (H), Neuropathy in diabetes (H), Unsteady gait       * order for DME     1 Units    Equipment being ordered: four pronged cane    Unsteady gait, Neuropathy in diabetes (H), Type 2 diabetes, HbA1C goal < 8% (H)       simvastatin 20 MG tablet    ZOCOR    90 tablet    Take 1 tablet (20 mg) by mouth At Bedtime    Hyperlipidemia LDL goal <100       UNABLE TO FIND      MEDICATION NAME: Prednisolone Acetate, Gatifloxacin and Bromfenac 1/0.5/0.075% - instill one drop in the surgery eye three times daily beginning 2 days before surgery        VITAMIN B 12 PO      Take 250 mcg by mouth daily    Excessive anger       VITAMIN D (CHOLECALCIFEROL) PO      Take 1,000 Units by mouth daily.        * Notice:  This list has 8 medication(s) that are the same as other medications prescribed for you. Read the directions carefully, and ask your doctor or other care provider to review them with you.

## 2018-02-06 NOTE — LETTER
2/6/2018         RE: Steve Morgan  [id number]  [facility] [address]        Dear Colleague,    Thank you for referring your patient, Steve Morgan, to the [facility]. Please see a copy of my visit note below.    Diabetes Self Management Training: Professional Continuous Glucose Monitor Insertion    SUBJECTIVE:   Steve Morgan is accompanied by self    Patient concerns: Do I need to check my blood sugars?    OBJECTIVE:    Lab Results:  Lab Results   Component Value Date    A1C 9.0 12/08/2017      Lab Results   Component Value Date     02/01/2018     Lab Results   Component Value Date    HDL 30 05/17/2017       Vitals:  There were no vitals taken for this visit.    ASSESSMENT:    LibrePro sensor started today. (Lot # [id number], Serial # [id number], Expiration date 5/31/2018) Sensor was inserted with no resistance or bleeding at insertion site.    Pt will plan to wear the sensor through  2/20/18.    WRITTEN AND VERBAL INFORMATION GIVEN TO SUPPORT UNDERSTANDING OF:  LibrePro CGM: Sensor insertion, intention of monitoring for 14 days. Keep records of BG, food intake, exercise, and medication dosing during wear.       Patient verbalizes understanding of how to remove sensor and all instructions provided.     Educational and other materials:  Food/exercise/medication log sheets  Contact information  Return Check List    PLAN:  Pt was given instructions for tracking BG, medications, food intake and activity.    See Patient Instructions, AVS printed and provided to patient today.    Follow-up:    CDE education visit for CGM interpretation scheduled on 2/12/18 & 2/22/18    Patient to return all items associated with professional Continuous Glucose Monitor System to the [facility] by 2/22/18.     Debby Powell MS, RD, LD, CDE    Time Spent: 10 minutes  Encounter Type: Individual

## 2018-02-06 NOTE — PROGRESS NOTES
Diabetes Self Management Training: Professional Continuous Glucose Monitor Insertion    SUBJECTIVE:   Steve Morgan is accompanied by self    Patient concerns: Do I need to check my blood sugars?    OBJECTIVE:    Lab Results:  Lab Results   Component Value Date    A1C 9.0 12/08/2017      Lab Results   Component Value Date     02/01/2018     Lab Results   Component Value Date    HDL 30 05/17/2017       Vitals:  There were no vitals taken for this visit.    ASSESSMENT:    LibrePro sensor started today. (Lot # [id number], Serial # [id number], Expiration date 5/31/2018) Sensor was inserted with no resistance or bleeding at insertion site.    Pt will plan to wear the sensor through  2/20/18.    WRITTEN AND VERBAL INFORMATION GIVEN TO SUPPORT UNDERSTANDING OF:  LibrePro CGM: Sensor insertion, intention of monitoring for 14 days. Keep records of BG, food intake, exercise, and medication dosing during wear.       Patient verbalizes understanding of how to remove sensor and all instructions provided.     Educational and other materials:  Food/exercise/medication log sheets  Contact information  Return Check List    PLAN:  Pt was given instructions for tracking BG, medications, food intake and activity.    See Patient Instructions, AVS printed and provided to patient today.    Follow-up:    CDE education visit for CGM interpretation scheduled on 2/12/18 & 2/22/18    Patient to return all items associated with professional Continuous Glucose Monitor System to the [facility] by 2/22/18.     Debby Powell MS, RD, LD, CDE    Time Spent: 10 minutes  Encounter Type: Individual

## 2018-02-06 NOTE — LETTER
"    2/6/2018         RE: Steve Morgan  [address]  [facility] [address]        Dear Colleague,    Thank you for referring your patient, Steve Morgan, to the [facility]. Please see a copy of my visit note below.    Name: Steve \"Caio\"Cathy  Seen at the request of Lisa Pierre for Diabetes (Last seen 12/8/2017).  HPI:  Steve Morgan is a 57 year old male who presents for the evaluation/management of:    1. Type 2 DM:  Orginally diagnosed at the age of 33 or 38.  Was prediabetic prior, was not particularly symptomatic at the time, was noted on routine lab work    Current Regimen: Metformin 1000 mg bid, glipzide 20 mg q am, Lantus 18 units qd (started 5/2017)     BS checks: Infrequent - not testing since July.    Average Meter Download: did not bring meter    Elevated blood sugar due to continued dietary indiscretions, states he hasn't been watching his diet as carefully.  Likes to snack between meals, buys snacks at the gas station.    History of  lower right abdominal infection/ abcess June 2017.    Complications:   Diabetes Complications  Description / Detail    Diabetic Retinopathy  No retinopathy, early cataract, last exam 6/2017.  Cataract surgery yesterday, right eye scheduled 2/19/18   CAD / PAD  No   Neuropathy  Yes: numbness hands and feet   Nephropathy / Microalbuminuria  No   Gastroparesis  No   Hypoglycemia [place]  Not sure, gets 'kind of dizzy' when blood sugar is low but reports history of low blood sugars without symptoms     2. Hypertension: Blood Pressure today:   BP Readings from Last 3 Encounters:   02/06/18 136/78   01/31/18 132/80   01/08/18 138/84   .  Blood pressure medications include atenolol 25 mg qd and losartan 50 mg qd  .  Takes medications everyday without forgetting a dose.  Denies feeling lightheaded or dizzy.   3. Hyperlipidemia: Takes simvastatin 20 mg for lipid control.  Denies muscle aches of pains.       4. Prevention:  Flu Shot- " 9/2017  Pneumovax- 2012  Opthalmology-Yes: dilated eye exam 6/2017, cataract surgery yesterday + 2/19/2018  Dental-Yes: twice a year  ASA-Yes, 81 mg qd   Smoking- No  5.  Hypothyroidism. Currently treated with levoxyl 175 mcg daily.  This dose was decreased 3 months ago due to low TSH and elevated T4. Takes the levothyroxine first thing in the morning and waits 30+ minutes before eating breakfast.    PMH/PSH:  Past Medical History:   Diagnosis Date     Cellulitis and abscess of trunk 6/27/2017     Depression      Hyperlipidemia LDL goal <100 10/31/2010     Hypertension goal BP (blood pressure) < 140/90 3/17/2011     Hypothyroidism 1/12/2010     Morbid obesity due to excess calories (H) 1/12/2010     Neuropathy in diabetes (H) 1/12/2010     Obesity 1/12/2010     Sleep apnea 1/12/2010    CPAP     Type 2 diabetes, HbA1C goal < 8% (H) 3/8/2011     Past Surgical History:   Procedure Laterality Date     COLONOSCOPY       GENITOURINARY SURGERY      surg for undescended testicle     REPAIR HAMMER TOE BILATERAL  5/16/2013    Procedure: REPAIR HAMMER TOE BILATERAL;  Flexor Tenotomy Toes 2,3,4,5 Bilateral Feet;  Surgeon: Saad Bangura DPM;  Location: [facility] OR     Family Hx:  Family History   Problem Relation Age of Onset     Breast Cancer Mother      Hypertension Mother      Thyroid Disease Mother      Depression Mother      Cancer - colorectal Father      Thyroid Disease Father      Depression Father      HEART DISEASE Maternal Grandmother      CHF     Circulatory Paternal Grandmother      CANCER Paternal Grandfather      DIABETES Brother      DIABETES Brother      Thyroid disease: Yes: mother         DM2: Yes: one brother with type 1 diabetes and one brother with type 2 diabetes, paternal aunt with DM2         Autoimmune: DM1, SLE, RA, Vitiligo Yes: brother with DM1    Social Hx:  Social History     Social History     Marital status:      Spouse name: Sri     Number of children: 2     Years of education: N/A      Occupational History      None      [employer]     Social History Main Topics     Smoking status: Never Smoker     Smokeless tobacco: Never Used     Alcohol use Yes      Comment: occ     Drug use: No     Sexual activity: Yes     Partners: Female     Other Topics Concern     Parent/Sibling W/ Cabg, Mi Or Angioplasty Before 65f 55m? No     Social History Narrative          MEDICATIONS:  has a current medication list which includes the following prescription(s): UNABLE TO FIND, finasteride, levoxyl, citalopram, atenolol, bupropion, insulin glargine, glipizide, insulin pen needle, metformin, hydrocortisone, insulin glargine, blood glucose monitoring, blood glucose monitoring, losartan, simvastatin, omeprazole-sodium bicarbonate, blood glucose monitoring, blood glucose monitoring, cyanocobalamin, calcium carb-cholecalciferol, order for dme, order for dme, cholecalciferol, ferrous sulfate, aspirin, multi-vitamin, and ammonium lactate, and the following Facility-Administered Medications: cefazolin.    ROS     ROS: 10 point ROS neg other than the symptoms noted above in the HPI.    Physical Exam   VS: /78 (BP Location: Left arm, Patient Position: Chair, Cuff Size: Adult Large)  Pulse 76  Temp 97.8  F (36.6  C) (Oral)  Wt 109.7 kg (241 lb 12.8 oz)  BMI 40.24 kg/m2  GENERAL: AXOX3, NAD, well dressed, answering questions appropriately, appears stated age.  HEENT: no exopthalmous, no proptosis, EOMI, no lig lag, no retraction  CV: RRR, no rubs, gallops, no murmurs  LUNGS: CTAB, no wheezes, rales, or ronchi  ABDOMEN: soft, nontender, nondistended  EXTREMITIES: no edema, feet with 2+ pulses, absent plantar sensation bilaterally, scattered bilateral calluses, trace edema at the ankles  NEUROLOGY: CN grossly intact, + monofilament, no tremors  MSK: grossly intact  SKIN: no rashes, no lesions    LABS:  A1c:   Component      Latest Ref Rng & Units 5/17/2017 9/7/2017 12/8/2017   Hemoglobin A1C      4.3 - 6.0 %  10.0 (H) 8.5 (H) 9.0 (H)     Basic Metabolic Panel:  !COMPREHENSIVE Latest Ref Rng & Units 5/17/2017   SODIUM 133 - 144 mmol/L 131 (L)   POTASSIUM 3.4 - 5.3 mmol/L 4.6   CHLORIDE 94 - 109 mmol/L 98   BUN 7 - 30 mg/dL 10   Creatinine 0.66 - 1.25 mg/dL 1.02   Glucose 70 - 99 mg/dL 368 (H)   ANION GAP 3 - 14 mmol/L 9   CALCIUM 8.5 - 10.1 mg/dL 9.2   ALBUMIN 3.4 - 5.0 g/dL 3.8     LFTS/Cholesterol Panel:  !LIPID/HEPATIC Latest Ref Rng & Units 5/17/2017   CHOLESTEROL <200 mg/dL 192   TRIGLYCERIDES <150 mg/dL 179 (H)   HDL CHOLESTEROL >39 mg/dL 30 (L)   LDL CHOLESTEROL, CALCULATED <100 mg/dL 126 (H)   VLDL-CHOLESTEROL 0 - 30 mg/dL    NON HDL CHOLESTEROL <130 mg/dL 162 (H)   CHOLESTEROL/HDL RATIO 0.0 - 5.0    AST 0 - 45 U/L 22   ALT 0 - 70 U/L 35     Thyroid Function:   !THYROID Latest Ref Rng & Units 12/8/2017 12/22/2016 11/15/2016   TSH 0.40 - 4.00 mU/L 0.07 (L) 0.48 11.71 (H)   T4 FREE 0.76 - 1.46 ng/dL 1.59 (H)  1.06     Urine MicroAlbumin:  Component    Latest Ref Rng & Units 11/15/2016   Creatinine Urine    mg/dL 17   Albumin Urine mg/L    mg/L <5   Albumin Urine mg/g Cr    0 - 17 mg/g Cr Unable to calc     Vitamin D:    All pertinent notes, labs, and images personally reviewed by me.     A/P  Mr.Walter Morgan is a 57 year old here for the evaluation/management of diabetes:    1. DM2 - Uncontrolled.    Obtain diagnostic CGM.  Follow up in 1 and 2 weeks to make insulin dose adjustments.  He has a difficult time testing his blood sugar due to significant hand tremors.  Consider personal CGM - Nina, information provided.  He believes he has medicare so it should be covered.  I've asked him to bring his lancing device with him in one week - if insurance covers it, an Accu Chek fastclick might work better for him if he doesn't change to Nina.  There is some variability among people, most will usually develop symptoms suggestive of hypoglycemia when blood glucose levels are lowered to the mid 60's. The first set of  symptoms are called adrenergic. Patients may experience any of the following nervousness, sweating, intense hunger, trembling, weakness, palpitations, and difficulty speaking.   The acute management of hypoglycemia involves the rapid delivery of a source of easily absorbed sugar. Regular soda, juice, lifesavers, table sugar, are good options. 15 grams of glucose is the dose that is given, followed by an assessment of symptoms and a blood glucose check if possible. If after 10 minutes there is no improvement, another 10-15 grams should be given. This can be repeated up to three times. The equivalency of 10-15 grams of glucose (approximate servings) are: 3-5 hard candies, 3 teaspoons of sugar, or 1/2 cup of regular soda or juice.      2. Hypothyroidism.  Currently treated with Levoxyl (KAMERON) 175 mcg/day.  Will obtain TFT's today and adjust levoxyl dose if indicated.    3.  Hypertension - Variable.      4. Hyperlipidemia - On statin therapy.    Labs ordered today:   Orders Placed This Encounter   Procedures     GLUCOSE MONITORING CONTINUOUS, >=72 HR     TSH     T4 free     DIABETES EDUCATOR REFERRAL       Radiology/Consults ordered today: DIABETES EDUCATOR REFERRAL    All questions were answered.  The patient indicates understanding of the above issues and agrees with the plan set forth.  Total face to face time greater than or equal to 25 minutes.       Follow-up:  1 and 2 weeks with diabetes ed  3 months with me    Diana Butler NP  Endocrinology  [facility]  CC: Lisa Pierre    Again, thank you for allowing me to participate in the care of your patient.        Sincerely,        BRIDGET Payne CNP

## 2018-02-06 NOTE — MR AVS SNAPSHOT
After Visit Summary   2/6/2018    Steve Morgan    MRN: [id number]           Patient Information     Date Of Birth          1960        Visit Information        Provider Department      2/6/2018 1:30 PM Megan Case RN [facility]        Today's Diagnoses     Type 2 diabetes mellitus with diabetic neuropathy, with long-term current use of insulin (H)    -  1       Follow-ups after your visit        Your next 10 appointments already scheduled     Feb 12, 2018  1:30 PM CST   Diabetic Education with Lucía WINKLER Kyler   [facility] [place] ([facility])    [address]  [facility] [address]   [phone]            Feb 13, 2018  1:00 PM CST   Ech Dobutamine Stress Test with RHDOBSTRESS   [facility] ([facility])    [address]  [facility] [address]   [phone]           1.  Please bring or wear a comfortable two-piece outfit and walking shoes. 2.  Stop eating 3 hours before the test. You may drink water or juice. 3.  Stop all caffeine 12 hours before the test. This includes coffee, tea, soda pop, chocolate and certain medicines (such as Anacin and Excederin). Also avoid decaf coffee and tea, as these contain small amounts of caffeine. 4.  No alcohol, smoking or use of other tobacco products for 12 hours before the test. 5.  Refer to your provider instructions to see if you need to stop any medications (such as beta-blockers or nitrates) for this test. 6.  For patients with diabetes: -   If you take insulin, call your diabetes care team. Ask if you should take a   dose the morning of your test. -   If you take diabetes medicine by mouth, don't take it on the morning of your test. Bring it with you to take after the test.  (If you have questions, call your diabetes care team) 7.  When you arrive, please tell us if: -   You have diabetes. -   You have taken Viagra, Cialis or  Levitra in the past 48 hours. 8.  For any questions that cannot be answered, please contact the ordering physician            Feb 22, 2018 10:30 AM CST   Diabetic Education with Megan Case RN   [facility] ([facility])    [address]  [facility] [address] [phone]            May 08, 2018 11:30 AM CDT   Return Visit with BRIDGET Payne CNP   [facility]    [address]  [facility] [address] [phone]              Future tests that were ordered for you today     Open Future Orders        Priority Expected Expires Ordered    TSH Routine  2/28/2018 2/6/2018    T4 free Routine  2/28/2018 2/6/2018    GLUCOSE MONITORING CONTINUOUS, >=72 HR Routine  2/6/2019 2/6/2018            Who to contact     If you have questions or need follow up information about today's clinic visit or your schedule please contact [facility] directly at [phone].  Normal or non-critical lab and imaging results will be communicated to you by goodideazshart, letter or phone within 4 business days after the clinic has received the results. If you do not hear from us within 7 days, please contact the clinic through [employer] or phone. If you have a critical or abnormal lab result, we will notify you by phone as soon as possible.  Submit refill requests through [employer] or call your pharmacy and they will forward the refill request to us. Please allow 3 business days for your refill to be completed.          Additional Information About Your Visit        [employer] Information     [employer] gives you secure access to your electronic health record. If you see a primary care provider, you can also send messages to your care team and make appointments. If you have questions, please call your primary care clinic.  If you do not have a primary care provider, please call [phone] and they  will assist you.        Care EveryWhere ID     This is your Care EveryWhere ID. This could be used by other organizations to access your [facility] medical records  [id number]         Blood Pressure from Last 3 Encounters:   02/06/18 136/78   01/31/18 132/80   01/08/18 138/84    Weight from Last 3 Encounters:   02/06/18 109.7 kg (241 lb 12.8 oz)   01/31/18 110.2 kg (243 lb)   01/08/18 106.1 kg (234 lb)              We Performed the Following     CONTINUOUS GLUCOSE MONITORING                              [[id number]]        Primary Care Provider Office Phone # Fax #    Lisa Pierre PA-C [phone] [address] [facility] [address]        Equal Access to Services     KE ALMONTE : Hadii aad ku hadasho Soomaali, waaxda luqadaha, qaybta kaalmada adeegyada, jayson gimenez . So [facility] [phone].    ATENCIÓN: Si habla español, tiene a adler disposición servicios gratuitos de asistencia lingüística. [facility] [phone].    We comply with applicable federal civil rights laws and [place] laws. We do not discriminate on the basis of race, color, national origin, age, disability, sex, sexual orientation, or gender identity.            Thank you!     Thank you for choosing [facility]  for your care. Our goal is always to provide you with excellent care. Hearing back from our patients is one way we can continue to improve our services. Please take a few minutes to complete the written survey that you may receive in the mail after your visit with us. Thank you!             Your Updated Medication List - Protect others around you: Learn how to safely use, store and throw away your medicines at www.disposemymeds.org.          This list is accurate as of 2/6/18  4:11 PM.  Always use your most recent med list.                   Brand Name Dispense Instructions for use Diagnosis    ammonium lactate 12 % cream    LAC-HYDRIN    385 g    Apply topically 2  times daily as needed for dry skin    Diabetic polyneuropathy associated with type 2 diabetes mellitus (H), Pre-ulcerative calluses, Pes planus of both feet       aspirin 81 MG tablet     100    ONE DAILY        atenolol 25 MG tablet    TENORMIN    90 tablet    Take 1 tablet (25 mg) by mouth every morning    Essential hypertension with goal blood pressure less than 140/90       * blood glucose monitoring lancets     1 Box    Use to test blood sugars 2 times daily or as directed.    Type 2 diabetes, HbA1C goal < 8% (H)       * blood glucose monitoring lancets     100 each    Use to test blood sugar 3 times daily or as directed.    Type 2 diabetes mellitus with diabetic neuropathy, unspecified long term insulin use status (H)       * blood glucose monitoring test strip    ONETOUCH VERIO IQ    100 strip    Use to test blood sugars 2 times daily or as directed.    Type 2 diabetes, HbA1C goal < 8% (H)       * blood glucose monitoring test strip    KERLINE CONTOUR NEXT    100 strip    Use to test blood sugar 3 times daily or as directed.    Type 2 diabetes mellitus with diabetic neuropathy, unspecified long term insulin use status (H)       buPROPion 300 MG 24 hr tablet    WELLBUTRIN XL    90 tablet    Take 1 tablet (300 mg) by mouth every morning    Excessive anger       CALCIUM 600 + D PO      Take 1 tablet by mouth daily        citalopram 20 MG tablet    celeXA    90 tablet    Take 1 tablet (20 mg) by mouth daily    Excessive anger       ferrous sulfate 325 (65 FE) MG tablet    IRON     Take 1 tablet by mouth daily (with breakfast).        finasteride 5 MG tablet    PROSCAR    90 tablet    Take 1 tablet (5 mg) by mouth daily    Benign prostatic hyperplasia with urinary hesitancy       glipiZIDE 10 MG 24 hr tablet    GLUCOTROL XL    180 tablet    Take 2 tablets (20 mg) by mouth every morning    Type 2 diabetes mellitus with diabetic neuropathy, without long-term current use of insulin (H)       hydrocortisone 0.2 % cream     WESTCORT    45 g    Apply topically 2 times daily as needed Apply sparingly to affected area, BID.    Dermatitis       insulin pen needle 31G X 5 MM    B-D U/F    100 each    Use 1 daily or as directed.    Type 2 diabetes mellitus with diabetic neuropathy, unspecified long term insulin use status (H)       * LANTUS SOLOSTAR 100 UNIT/ML injection   Generic drug:  insulin glargine     15 mL    Inject 18 units SQ daily    Type 2 diabetes mellitus with diabetic neuropathy, unspecified long term insulin use status (H)       * insulin glargine 100 UNIT/ML injection    LANTUS SOLOSTAR    45 mL    18 units qd.  Increase as directed to max dose of 45 units qd.    Type 2 diabetes mellitus with diabetic neuropathy, with long-term current use of insulin (H)       LEVOXYL 175 MCG tablet   Generic drug:  levothyroxine     90 tablet    Take 1 tablet (175 mcg) by mouth daily    Hypothyroidism due to acquired atrophy of thyroid       losartan 50 MG tablet    COZAAR    90 tablet    TAKE 1 TABLET DAILY    Essential hypertension with goal blood pressure less than 140/90       metFORMIN 1000 MG tablet    GLUCOPHAGE    180 tablet    Take 1 tablet (1,000 mg) by mouth 2 times daily (with meals)    Type 2 diabetes mellitus with diabetic neuropathy, without long-term current use of insulin (H)       Multi-vitamin Tabs tablet   Generic drug:  multivitamin, therapeutic with minerals     30    1 TABLET DAILY        omeprazole-sodium bicarbonate  MG per capsule    ZEGERID     Take 1 capsule by mouth every morning (before breakfast)        * order for DME     1 Units    Equipment being ordered: single cane with rubber foot    Type 2 diabetes, HbA1C goal < 8% (H), Neuropathy in diabetes (H), Unsteady gait       * order for DME     1 Units    Equipment being ordered: four pronged cane    Unsteady gait, Neuropathy in diabetes (H), Type 2 diabetes, HbA1C goal < 8% (H)       simvastatin 20 MG tablet    ZOCOR    90 tablet    Take 1 tablet (20  mg) by mouth At Bedtime    Hyperlipidemia LDL goal <100       UNABLE TO FIND      MEDICATION NAME: Prednisolone Acetate, Gatifloxacin and Bromfenac 1/0.5/0.075% - instill one drop in the surgery eye three times daily beginning 2 days before surgery        VITAMIN B 12 PO      Take 250 mcg by mouth daily    Excessive anger       VITAMIN D (CHOLECALCIFEROL) PO      Take 1,000 Units by mouth daily.        * Notice:  This list has 8 medication(s) that are the same as other medications prescribed for you. Read the directions carefully, and ask your doctor or other care provider to review them with you.

## 2018-02-06 NOTE — PROGRESS NOTES
"Name: Steve \"Caio\" Cathy  Seen at the request of Lisa Pierre for Diabetes (Last seen 12/8/2017).  HPI:  Steve Morgan is a 57 year old male who presents for the evaluation/management of:    1. Type 2 DM:  Orginally diagnosed at the age of 33 or 38.  Was prediabetic prior, was not particularly symptomatic at the time, was noted on routine lab work    Current Regimen: Metformin 1000 mg bid, glipzide 20 mg q am, Lantus 18 units qd (started 5/2017)     BS checks: Infrequent - not testing since July.    Average Meter Download: did not bring meter    Elevated blood sugar due to continued dietary indiscretions, states he hasn't been watching his diet as carefully.  Likes to snack between meals, buys snacks at the gas station.    History of  lower right abdominal infection/ abcess June 2017.    Complications:   Diabetes Complications  Description / Detail    Diabetic Retinopathy  No retinopathy, early cataract, last exam 6/2017.  Cataract surgery yesterday, right eye scheduled 2/19/18   CAD / PAD  No   Neuropathy  Yes: numbness hands and feet   Nephropathy / Microalbuminuria  No   Gastroparesis  No   Hypoglycemia [place]  Not sure, gets 'kind of dizzy' when blood sugar is low but reports history of low blood sugars without symptoms     2. Hypertension: Blood Pressure today:   BP Readings from Last 3 Encounters:   02/06/18 136/78   01/31/18 132/80   01/08/18 138/84   .  Blood pressure medications include atenolol 25 mg qd and losartan 50 mg qd  .  Takes medications everyday without forgetting a dose.  Denies feeling lightheaded or dizzy.   3. Hyperlipidemia: Takes simvastatin 20 mg for lipid control.  Denies muscle aches of pains.       4. Prevention:  Flu Shot- 9/2017  Pneumovax- 2012  Opthalmology-Yes: dilated eye exam 6/2017, cataract surgery yesterday + 2/19/2018  Dental-Yes: twice a year  ASA-Yes, 81 mg qd   Smoking- No  5.  Hypothyroidism. Currently treated with levoxyl 175 mcg daily.  This dose was " decreased 3 months ago due to low TSH and elevated T4. Takes the levothyroxine first thing in the morning and waits 30+ minutes before eating breakfast.    PMH/PSH:  Past Medical History:   Diagnosis Date     Cellulitis and abscess of trunk 6/27/2017     Depression      Hyperlipidemia LDL goal <100 10/31/2010     Hypertension goal BP (blood pressure) < 140/90 3/17/2011     Hypothyroidism 1/12/2010     Morbid obesity due to excess calories (H) 1/12/2010     Neuropathy in diabetes (H) 1/12/2010     Obesity 1/12/2010     Sleep apnea 1/12/2010    CPAP     Type 2 diabetes, HbA1C goal < 8% (H) 3/8/2011     Past Surgical History:   Procedure Laterality Date     COLONOSCOPY       GENITOURINARY SURGERY      surg for undescended testicle     REPAIR HAMMER TOE BILATERAL  5/16/2013    Procedure: REPAIR HAMMER TOE BILATERAL;  Flexor Tenotomy Toes 2,3,4,5 Bilateral Feet;  Surgeon: Saad Bangura DPM;  Location: [facility] OR     Family Hx:  Family History   Problem Relation Age of Onset     Breast Cancer Mother      Hypertension Mother      Thyroid Disease Mother      Depression Mother      Cancer - colorectal Father      Thyroid Disease Father      Depression Father      HEART DISEASE Maternal Grandmother      CHF     Circulatory Paternal Grandmother      CANCER Paternal Grandfather      DIABETES Brother      DIABETES Brother      Thyroid disease: Yes: mother         DM2: Yes: one brother with type 1 diabetes and one brother with type 2 diabetes, paternal aunt with DM2         Autoimmune: DM1, SLE, RA, Vitiligo Yes: brother with DM1    Social Hx:  Social History     Social History     Marital status:      Spouse name: Sri     Number of children: 2     Years of education: N/A     Occupational History      None      [employer]     Social History Main Topics     Smoking status: Never Smoker     Smokeless tobacco: Never Used     Alcohol use Yes      Comment: occ     Drug use: No     Sexual activity: Yes      Partners: Female     Other Topics Concern     Parent/Sibling W/ Cabg, Mi Or Angioplasty Before 65f 55m? No     Social History Narrative          MEDICATIONS:  has a current medication list which includes the following prescription(s): UNABLE TO FIND, finasteride, levoxyl, citalopram, atenolol, bupropion, insulin glargine, glipizide, insulin pen needle, metformin, hydrocortisone, insulin glargine, blood glucose monitoring, blood glucose monitoring, losartan, simvastatin, omeprazole-sodium bicarbonate, blood glucose monitoring, blood glucose monitoring, cyanocobalamin, calcium carb-cholecalciferol, order for dme, order for dme, cholecalciferol, ferrous sulfate, aspirin, multi-vitamin, and ammonium lactate, and the following Facility-Administered Medications: cefazolin.    ROS     ROS: 10 point ROS neg other than the symptoms noted above in the HPI.    Physical Exam   VS: /78 (BP Location: Left arm, Patient Position: Chair, Cuff Size: Adult Large)  Pulse 76  Temp 97.8  F (36.6  C) (Oral)  Wt 109.7 kg (241 lb 12.8 oz)  BMI 40.24 kg/m2  GENERAL: AXOX3, NAD, well dressed, answering questions appropriately, appears stated age.  HEENT: no exopthalmous, no proptosis, EOMI, no lig lag, no retraction  CV: RRR, no rubs, gallops, no murmurs  LUNGS: CTAB, no wheezes, rales, or ronchi  ABDOMEN: soft, nontender, nondistended  EXTREMITIES: no edema, feet with 2+ pulses, absent plantar sensation bilaterally, scattered bilateral calluses, trace edema at the ankles  NEUROLOGY: CN grossly intact, + monofilament, no tremors  MSK: grossly intact  SKIN: no rashes, no lesions    LABS:  A1c:   Component      Latest Ref Rng & Units 5/17/2017 9/7/2017 12/8/2017   Hemoglobin A1C      4.3 - 6.0 % 10.0 (H) 8.5 (H) 9.0 (H)     Basic Metabolic Panel:  !COMPREHENSIVE Latest Ref Rng & Units 5/17/2017   SODIUM 133 - 144 mmol/L 131 (L)   POTASSIUM 3.4 - 5.3 mmol/L 4.6   CHLORIDE 94 - 109 mmol/L 98   BUN 7 - 30 mg/dL 10   Creatinine 0.66 -  1.25 mg/dL 1.02   Glucose 70 - 99 mg/dL 368 (H)   ANION GAP 3 - 14 mmol/L 9   CALCIUM 8.5 - 10.1 mg/dL 9.2   ALBUMIN 3.4 - 5.0 g/dL 3.8     LFTS/Cholesterol Panel:  !LIPID/HEPATIC Latest Ref Rng & Units 5/17/2017   CHOLESTEROL <200 mg/dL 192   TRIGLYCERIDES <150 mg/dL 179 (H)   HDL CHOLESTEROL >39 mg/dL 30 (L)   LDL CHOLESTEROL, CALCULATED <100 mg/dL 126 (H)   VLDL-CHOLESTEROL 0 - 30 mg/dL    NON HDL CHOLESTEROL <130 mg/dL 162 (H)   CHOLESTEROL/HDL RATIO 0.0 - 5.0    AST 0 - 45 U/L 22   ALT 0 - 70 U/L 35     Thyroid Function:   !THYROID Latest Ref Rng & Units 12/8/2017 12/22/2016 11/15/2016   TSH 0.40 - 4.00 mU/L 0.07 (L) 0.48 11.71 (H)   T4 FREE 0.76 - 1.46 ng/dL 1.59 (H)  1.06     Urine MicroAlbumin:  Component    Latest Ref Rng & Units 11/15/2016   Creatinine Urine    mg/dL 17   Albumin Urine mg/L    mg/L <5   Albumin Urine mg/g Cr    0 - 17 mg/g Cr Unable to calc     Vitamin D:    All pertinent notes, labs, and images personally reviewed by me.     A/P  Mr.Walter Morgan is a 57 year old here for the evaluation/management of diabetes:    1. DM2 - Uncontrolled.    Obtain diagnostic CGM.  Follow up in 1 and 2 weeks to make insulin dose adjustments.  He has a difficult time testing his blood sugar due to significant hand tremors.  Consider personal CGM - Nina, information provided.  He believes he has medicare so it should be covered.  I've asked him to bring his lancing device with him in one week - if insurance covers it, an Accu Chek fastclick might work better for him if he doesn't change to Nina.  There is some variability among people, most will usually develop symptoms suggestive of hypoglycemia when blood glucose levels are lowered to the mid 60's. The first set of symptoms are called adrenergic. Patients may experience any of the following nervousness, sweating, intense hunger, trembling, weakness, palpitations, and difficulty speaking.   The acute management of hypoglycemia involves the rapid delivery of  a source of easily absorbed sugar. Regular soda, juice, lifesavers, table sugar, are good options. 15 grams of glucose is the dose that is given, followed by an assessment of symptoms and a blood glucose check if possible. If after 10 minutes there is no improvement, another 10-15 grams should be given. This can be repeated up to three times. The equivalency of 10-15 grams of glucose (approximate servings) are: 3-5 hard candies, 3 teaspoons of sugar, or 1/2 cup of regular soda or juice.      2. Hypothyroidism.  Currently treated with Levoxyl (KAMERON) 175 mcg/day.  Will obtain TFT's today and adjust levoxyl dose if indicated.    3.  Hypertension - Variable.      4. Hyperlipidemia - On statin therapy.    Labs ordered today:   Orders Placed This Encounter   Procedures     GLUCOSE MONITORING CONTINUOUS, >=72 HR     TSH     T4 free     DIABETES EDUCATOR REFERRAL       Radiology/Consults ordered today: DIABETES EDUCATOR REFERRAL    All questions were answered.  The patient indicates understanding of the above issues and agrees with the plan set forth.  Total face to face time greater than or equal to 25 minutes.       Follow-up:  1 and 2 weeks with diabetes ed  3 months with mahendra Butler NP  Endocrinology  [facility]  CC: Lisa Pierre

## 2018-02-27 ENCOUNTER — TELEPHONE (OUTPATIENT)
Dept: EDUCATION SERVICES | Facility: CLINIC | Age: 58
End: 2018-02-27

## 2018-02-27 NOTE — TELEPHONE ENCOUNTER
Per chart review patient canceled both follow up appointment for review of CGM data and medication adjustments.  Patient has 2 follow up appointments scheduled on 3/8/18 and 3/22/18 that can be used to review sensor data if he brings in the sensor and/or replace a new sensor.  Called patient back.  He reports having the sensor and will bring it into his appointment 3/8/18.     Debby Powell MS, RD, LD, CDE

## 2018-03-08 ENCOUNTER — ALLIED HEALTH/NURSE VISIT (OUTPATIENT)
Dept: EDUCATION SERVICES | Facility: CLINIC | Age: 58
End: 2018-03-08
Payer: COMMERCIAL

## 2018-03-08 DIAGNOSIS — E11.40 TYPE 2 DIABETES MELLITUS WITH DIABETIC NEUROPATHY, WITH LONG-TERM CURRENT USE OF INSULIN (H): ICD-10-CM

## 2018-03-08 DIAGNOSIS — Z79.4 TYPE 2 DIABETES MELLITUS WITH DIABETIC NEUROPATHY, WITH LONG-TERM CURRENT USE OF INSULIN (H): ICD-10-CM

## 2018-03-08 DIAGNOSIS — E11.40 TYPE 2 DIABETES MELLITUS WITH DIABETIC NEUROPATHY, UNSPECIFIED LONG TERM INSULIN USE STATUS: ICD-10-CM

## 2018-03-08 PROCEDURE — G0108 DIAB MANAGE TRN  PER INDIV: HCPCS | Performed by: DIETITIAN, REGISTERED

## 2018-03-08 NOTE — PROGRESS NOTES
LibrePro Continuous Glucose Monitor Interpretation     Patient History:   1. Type of Diabetes: Type 2 diabetes  2. Duration of diabetes:  25-30 yrs   3. Current treatment regimen:       Diabetes Medication(s)     Biguanides Sig    metFORMIN (GLUCOPHAGE) 1000 MG tablet Take 1 tablet (1,000 mg) by mouth 2 times daily (with meals)    Insulin Sig    insulin glargine (LANTUS SOLOSTAR) 100 UNIT/ML injection 18 units qd.  Increase as directed to max dose of 45 units qd.    Sulfonylureas Sig    glipiZIDE (GLUCOTROL XL) 10 MG 24 hr tablet Take 2 tablets (20 mg) by mouth every morning        Currently taking 18 units Lantus  Has difficulty with BG testing due to tremors, interested in the personal Nina    Problems taking diabetes medications regularly? No    Side effects? No   4. Most recent A1c values:  Lab Results   Component Value Date    A1C 9.0 2017    A1C 8.5 2017    A1C 10.0 2017    A1C 7.7 2016     5. Indication/s for LibrePro study: Difficult to manage hypoglycemia and/or hyperglycemia, despite multiple adjustments in self-monitoring of blood glucose and diabetes medication administration and lack of BG data.          Statistics:   1. Sensor worn for 14 days.  2. Statistics:    Percent in target is: 2%  Percent above target is: 98%  (percent above 250 mg/dl: 85%)  Percent below target is: 0%  3.  Estimated A1c: 13.8%                        Data evaluation:   Ambulatory Glucose Profile (AGP) shows the followin. Consistent day-to-day patterns noted: pattern of daytime hyperglycemia  2. pattern of nocturnal hyperglycemia.  3. Average glucose: 350 mg/dL.  SD (Standard Deviation): 86  4. Hypoglycemia: None    Patient's Logbook shows the following:  Pt did not keep a log  Carbohydrate counting is: inaccurate, pt admits to excessive snacking on cookies and other sweets  Medication and/or insulin dosing is: accurate, would benefit from increase in basal insulin    Education provided  today:  AADE Self-Care Behaviors:  Healthy Eating: carbohydrate counting, consistency in amount, composition, and timing of food intake, portion control and behavior change process  Monitoring: benefits of personal CGM, provided over view of Nina  Taking Medication: insulin dose adjustments  Problem Solving: high blood glucose - causes, signs/symptoms, treatment and prevention  Reducing Risks: prevention, early diagnostic measures and treatment of complications and A1C - goals, relating to blood glucose levels, how often to check  Healthy Coping: recognize feelings about diagnosis and utilize support systems- wife is supportive of pt making lifestyle changes, pt notes she's very careful with diet due to pre-diabetes    Opportunities for ongoing education and support in diabetes-self management were discussed.    Pt verbalized understanding of concepts discussed and recommendations provided today.       Educational and other materials:  Copy of CGM report    Assessment and Plan:  1. Meal Plan Recommendation: Aim for 3-4 carb servings at meals and 0-1 carb servings at snacks. Add breakfast (oatmeal). Limit/avoid  sweets. Increase salads and vegetables. Consider joining weight watchers.  2. Will pend order for personal Nina for provider signature (Dr. Meraz). Pt does not meet all criteria for Medicare part B. Per recommendation of [employer] rep- Rx can be sent to [facility] with note to pharmacy instructing to run thru Medicare Part D to see if covered. Otherwise- cash option with use of singlecare discount.  3. Recommend increase Lantus insulin from 18 units --> 22 units. Needs continued adjustment.  4. Return in 2 weeks. CDE appt scheduled for 3/22/18 at 10:30 am.    Lucía Chávez RD, CDE  Diabetes     Time Spent: 60 minutes  Any diabetes medication dose changes were made via the CDE Protocol and Collaborative Practice Agreement with the patient's endocrinology provider. A copy of this encounter was  shared with the provider.

## 2018-03-08 NOTE — MR AVS SNAPSHOT
After Visit Summary   3/8/2018    Steve Morgan    MRN: [id number]           Patient Information     Date Of Birth          1960        Visit Information        Provider Department      3/8/2018 9:30 AM Lucía Chávez [facility]        Today's Diagnoses     Type 2 diabetes mellitus with diabetic neuropathy, with long-term current use of insulin (H)          Care Instructions    My Diabetes Care Goals:    1. Meal Plan Recommendation: Aim for 3-4 carb servings at meals and 0-1 carb servings at snacks. Limit/avoid  sweets. Consider joining weight watchers.  2. Will pend order for personal Nina for provider signature (Dr. Meraz). Pt does not meet all criteria for Medicare part B. Per recommendation of [employer] rep- Rx can be sent to [facility] with note to pharmacy instructing to run thru Medicare Part D to see if covered. Otherwise- cash option with use of singlecare discount.  3. Recommend increase Lantus insulin from 18 units --> 22 units. Need continued adjustment.  4. Return in 2 weeks. CDE appt scheduled for 3/22/18 at 10:30 am.    Lucía Chávez RD, LD, CDE  Diabetes      Bring blood glucose meter and logbook with you to all doctor and follow-up appointments.     [facility] for the [facility]:  For Your Diabetes Education and Nutrition Appointments Call:  [phone]   For Diabetes Education or Nutrition Related Questions:   Phone: [phone]  E-mail: [email or web address]  Fax: [phone]   If you need a medication refill please contact your pharmacy. Please allow 3 business days for your refills to be completed.    Instructions for emailing the Diabetes Educators    If you need to communicate a non-urgent message to a Diabetes Educator via email, please send to [email or web address].    Please follow the following email guidelines:    Subject line: Secure: your clinic name (example: Secure:  [facility])  In the email please include: First name, middle initial, last name and date of birth.    We will be in touch with you within one (1) business day.             Follow-ups after your visit        Your next 10 appointments already scheduled     Mar 13, 2018  1:00 PM CDT   Ech Dobutamine Stress Test with MENG   [facility] ([facility])    [address]  [facility] [id number] [phone]           1.  Please bring or wear a comfortable two-piece outfit and walking shoes. 2.  Stop eating 3 hours before the test. You may drink water or juice. 3.  Stop all caffeine 12 hours before the test. This includes coffee, tea, soda pop, chocolate and certain medicines (such as Anacin and Excederin). Also avoid decaf coffee and tea, as these contain small amounts of caffeine. 4.  No alcohol, smoking or use of other tobacco products for 12 hours before the test. 5.  Refer to your provider instructions to see if you need to stop any medications (such as beta-blockers or nitrates) for this test. 6.  For patients with diabetes: -   If you take insulin, call your diabetes care team. Ask if you should take a   dose the morning of your test. -   If you take diabetes medicine by mouth, don't take it on the morning of your test. Bring it with you to take after the test.  (If you have questions, call your diabetes care team) 7.  When you arrive, please tell us if: -   You have diabetes. -   You have taken Viagra, Cialis or Levitra in the past 48 hours. 8.  For any questions that cannot be answered, please contact the ordering physician            Mar 22, 2018 10:30 AM CDT   Diabetic Education with Lucía Chávez   [facility] ([facility])    [address]  [facility] [address] [phone]            May 08, 2018 11:30 AM CDT   Return Visit with BRIDGET Payne CNP   [facility] ([facility])    [address]  [facility] [id number] [phone]              Who to contact     If you have questions or need follow up information about today's clinic visit or your schedule please contact [facility] directly at [phone].  Normal or non-critical lab and imaging results will be communicated to you by MyChart, letter or phone within 4 business days after the clinic has received the results. If you do not hear from us within 7 days, please contact the clinic through [facility] or phone. If you have a critical or abnormal lab result, we will notify you by phone as soon as possible.  Submit refill requests through [facility] [employer] [facility] or call your pharmacy and they will forward the refill request to us. Please allow 3 business days for your refill to be completed.          Additional Information About Your Visit        MyChart Information     [facility] [employer] gives you secure access to your electronic health record. If you see a primary care provider, you can also send messages to your care team and make appointments. If you have questions, please call your primary care clinic.  If you do not have a primary care provider, please call [phone] and they will assist you.        Care EveryWhere ID     This is your Care EveryWhere ID. This could be used by other organizations to access your [facility] medical records  [id number]         Blood Pressure from Last 3 Encounters:   02/06/18 136/78   01/31/18 132/80   01/08/18 138/84    Weight from Last 3 Encounters:   02/06/18 109.7 kg (241 lb 12.8 oz)   01/31/18 110.2 kg (243 lb)   01/08/18 106.1 kg (234 lb)              We Performed the Following     DIABETES EDUCATION - Individual  []          Today's Medication Changes          These changes are accurate as of 3/8/18 11:59 PM.  If you have any questions, ask your nurse or doctor.               These medicines have changed or have updated prescriptions.        Dose/Directions     * LANTUS SOLOSTAR 100 UNIT/ML injection   This may have changed:  Another medication with the same name was changed. Make sure you understand how and when to take each.   Used for:  Type 2 diabetes mellitus with diabetic neuropathy, unspecified long term insulin use status (H)   Generic drug:  insulin glargine   Changed by:  Lucía Chávez        Inject 18 units SQ daily   Quantity:  15 mL   Refills:  3       * insulin glargine 100 UNIT/ML injection   Commonly known as:  LANTUS SOLOSTAR   This may have changed:  additional instructions   Used for:  Type 2 diabetes mellitus with diabetic neuropathy, with long-term current use of insulin (H)   Changed by:  Lucía Chávez        22 units qd.  Increase as directed to max dose of 45 units qd.   Quantity:  45 mL   Refills:  1       * Notice:  This list has 2 medication(s) that are the same as other medications prescribed for you. Read the directions carefully, and ask your doctor or other care provider to review them with you.         Where to get your medicines      These medications were sent to [facility]/pharmacy #[id number] - [place], [place] - [address] [facility].  [address] [place] [address].[facility] [address]     Phone:  [phone]     insulin pen needle 31G X 5 MM         Some of these will need a paper prescription and others can be bought over the counter.  Ask your nurse if you have questions.     You don't need a prescription for these medications     insulin glargine 100 UNIT/ML injection                Primary Care Provider Office Phone # Fax #    Lisa Pierre PA-C [phone] [address] [facility] [address]        Equal Access to Services     [facility] AH: Hadii ana heltono Somayeali, waaxda luqadaha, qaybta kaalmada adeegyada, jayson schwartz. So [facility] [phone].    ATENCIÓN: Si habla español, tiene a adler disposición servicios gratuitos de asistencia lingüística. Llame al [phone].    We comply with applicable  federal civil rights laws and [place] laws. We do not discriminate on the basis of race, color, national origin, age, disability, sex, sexual orientation, or gender identity.            Thank you!     Thank you for choosing [facility]  for your care. Our goal is always to provide you with excellent care. Hearing back from our patients is one way we can continue to improve our services. Please take a few minutes to complete the written survey that you may receive in the mail after your visit with us. Thank you!             Your Updated Medication List - Protect others around you: Learn how to safely use, store and throw away your medicines at www.disposemymeds.org.          This list is accurate as of 3/8/18 11:59 PM.  Always use your most recent med list.                   Brand Name Dispense Instructions for use Diagnosis    ammonium lactate 12 % cream    LAC-HYDRIN    385 g    Apply topically 2 times daily as needed for dry skin    Diabetic polyneuropathy associated with type 2 diabetes mellitus (H), Pre-ulcerative calluses, Pes planus of both feet       aspirin 81 MG tablet     100    ONE DAILY        atenolol 25 MG tablet    TENORMIN    90 tablet    Take 1 tablet (25 mg) by mouth every morning    Essential hypertension with goal blood pressure less than 140/90       * blood glucose monitoring lancets     1 Box    Use to test blood sugars 2 times daily or as directed.    Type 2 diabetes, HbA1C goal < 8% (H)       * blood glucose monitoring lancets     100 each    Use to test blood sugar 3 times daily or as directed.    Type 2 diabetes mellitus with diabetic neuropathy, unspecified long term insulin use status (H)       * blood glucose monitoring test strip    ONETOUCH VERIO IQ    100 strip    Use to test blood sugars 2 times daily or as directed.    Type 2 diabetes, HbA1C goal < 8% (H)       * blood glucose monitoring test strip    KERLINE CONTOUR NEXT    100 strip    Use to test  blood sugar 3 times daily or as directed.    Type 2 diabetes mellitus with diabetic neuropathy, unspecified long term insulin use status (H)       buPROPion 300 MG 24 hr tablet    WELLBUTRIN XL    90 tablet    Take 1 tablet (300 mg) by mouth every morning    Excessive anger       CALCIUM 600 + D PO      Take 1 tablet by mouth daily        citalopram 20 MG tablet    celeXA    90 tablet    Take 1 tablet (20 mg) by mouth daily    Excessive anger       ferrous sulfate 325 (65 FE) MG tablet    IRON     Take 1 tablet by mouth daily (with breakfast).        finasteride 5 MG tablet    PROSCAR    90 tablet    Take 1 tablet (5 mg) by mouth daily    Benign prostatic hyperplasia with urinary hesitancy       glipiZIDE 10 MG 24 hr tablet    GLUCOTROL XL    180 tablet    Take 2 tablets (20 mg) by mouth every morning    Type 2 diabetes mellitus with diabetic neuropathy, without long-term current use of insulin (H)       hydrocortisone 0.2 % cream    WESTCORT    45 g    Apply topically 2 times daily as needed Apply sparingly to affected area, BID.    Dermatitis       insulin pen needle 31G X 5 MM    B-D U/F    100 each    Use 1 daily or as directed.    Type 2 diabetes mellitus with diabetic neuropathy, unspecified long term insulin use status (H)       * LANTUS SOLOSTAR 100 UNIT/ML injection   Generic drug:  insulin glargine     15 mL    Inject 18 units SQ daily    Type 2 diabetes mellitus with diabetic neuropathy, unspecified long term insulin use status (H)       * insulin glargine 100 UNIT/ML injection    LANTUS SOLOSTAR    45 mL    22 units qd.  Increase as directed to max dose of 45 units qd.    Type 2 diabetes mellitus with diabetic neuropathy, with long-term current use of insulin (H)       LEVOXYL 175 MCG tablet   Generic drug:  levothyroxine     90 tablet    Take 1 tablet (175 mcg) by mouth daily    Hypothyroidism due to acquired atrophy of thyroid       losartan 50 MG tablet    COZAAR    90 tablet    TAKE 1 TABLET DAILY     Essential hypertension with goal blood pressure less than 140/90       metFORMIN 1000 MG tablet    GLUCOPHAGE    180 tablet    Take 1 tablet (1,000 mg) by mouth 2 times daily (with meals)    Type 2 diabetes mellitus with diabetic neuropathy, without long-term current use of insulin (H)       Multi-vitamin Tabs tablet   Generic drug:  multivitamin, therapeutic with minerals     30    1 TABLET DAILY        omeprazole-sodium bicarbonate  MG per capsule    ZEGERID     Take 1 capsule by mouth every morning (before breakfast)        * order for DME     1 Units    Equipment being ordered: single cane with rubber foot    Type 2 diabetes, HbA1C goal < 8% (H), Neuropathy in diabetes (H), Unsteady gait       * order for DME     1 Units    Equipment being ordered: four pronged cane    Unsteady gait, Neuropathy in diabetes (H), Type 2 diabetes, HbA1C goal < 8% (H)       simvastatin 20 MG tablet    ZOCOR    90 tablet    Take 1 tablet (20 mg) by mouth At Bedtime    Hyperlipidemia LDL goal <100       UNABLE TO FIND      MEDICATION NAME: Prednisolone Acetate, Gatifloxacin and Bromfenac 1/0.5/0.075% - instill one drop in the surgery eye three times daily beginning 2 days before surgery        VITAMIN B 12 PO      Take 250 mcg by mouth daily    Excessive anger       VITAMIN D (CHOLECALCIFEROL) PO      Take 1,000 Units by mouth daily.        * Notice:  This list has 8 medication(s) that are the same as other medications prescribed for you. Read the directions carefully, and ask your doctor or other care provider to review them with you.

## 2018-03-08 NOTE — Clinical Note
Sammy Ortiz!  CGM report as requested- please bill when able.   Thanks! Lucía Chávez RD, CDE Diabetes

## 2018-03-08 NOTE — LETTER
3/8/2018         RE: Steve Morgan  [address]  [facility] [address]        Dear Colleague,    Thank you for referring your patient, Steve Morgan, to the [facility] [place]. Please see a copy of my visit note below.    LibrePro Continuous Glucose Monitor Interpretation     Patient History:   1. Type of Diabetes: Type 2 diabetes  2. Duration of diabetes:  25-30 yrs   3. Current treatment regimen:       Diabetes Medication(s)     Biguanides Sig    metFORMIN (GLUCOPHAGE) 1000 MG tablet Take 1 tablet (1,000 mg) by mouth 2 times daily (with meals)    Insulin Sig    insulin glargine (LANTUS SOLOSTAR) 100 UNIT/ML injection 18 units qd.  Increase as directed to max dose of 45 units qd.    Sulfonylureas Sig    glipiZIDE (GLUCOTROL XL) 10 MG 24 hr tablet Take 2 tablets (20 mg) by mouth every morning        Currently taking 18 units Lantus  Has difficulty with BG testing due to tremors, interested in the personal Nina    Problems taking diabetes medications regularly? No    Side effects? No   4. Most recent A1c values:  Lab Results   Component Value Date    A1C 9.0 2017    A1C 8.5 2017    A1C 10.0 2017    A1C 7.7 2016     5. Indication/s for LibrePro study: Difficult to manage hypoglycemia and/or hyperglycemia, despite multiple adjustments in self-monitoring of blood glucose and diabetes medication administration and lack of BG data.          Statistics:   1. Sensor worn for 14 days.  2. Statistics:    Percent in target is: 2%  Percent above target is: 98%  (percent above 250 mg/dl: 85%)  Percent below target is: 0%  3.  Estimated A1c: 13.8%                        Data evaluation:   Ambulatory Glucose Profile (AGP) shows the followin. Consistent day-to-day patterns noted: pattern of daytime hyperglycemia  2. pattern of nocturnal hyperglycemia.  3. Average glucose: 350 mg/dL.  SD (Standard Deviation): 86  4. Hypoglycemia: None    Patient's Logbook shows the  following:  Pt did not keep a log  Carbohydrate counting is: inaccurate, pt admits to excessive snacking on cookies and other sweets  Medication and/or insulin dosing is: accurate, would benefit from increase in basal insulin    Education provided today:  ASHKAN Self-Care Behaviors:  Healthy Eating: carbohydrate counting, consistency in amount, composition, and timing of food intake, portion control and behavior change process  Monitoring: benefits of personal CGM, provided over view of Nina  Taking Medication: insulin dose adjustments  Problem Solving: high blood glucose - causes, signs/symptoms, treatment and prevention  Reducing Risks: prevention, early diagnostic measures and treatment of complications and A1C - goals, relating to blood glucose levels, how often to check  Healthy Coping: recognize feelings about diagnosis and utilize support systems- wife is supportive of pt making lifestyle changes, pt notes she's very careful with diet due to pre-diabetes    Opportunities for ongoing education and support in diabetes-self management were discussed.    Pt verbalized understanding of concepts discussed and recommendations provided today.       Educational and other materials:  Copy of CGM report    Assessment and Plan:  1. Meal Plan Recommendation: Aim for 3-4 carb servings at meals and 0-1 carb servings at snacks. Limit/avoid  sweets. Consider joining weight watchers.  2. Will pend order for personal Nina for provider signature (Dr. Meraz). Pt does not meet all criteria for Medicare part B. Per recommendation of [employer] rep- Rx can be sent to [facility] with note to pharmacy instructing to run thru Medicare Part D to see if covered. Otherwise- cash option with use of singlecare discount.  3. Recommend increase Lantus insulin from 18 units --> 22 units. Needs continued adjustment.  4. Return in 2 weeks. CDE appt scheduled for 3/22/18 at 10:30 am.    Lucía Chávez RD, CDE  Diabetes     Time Spent:  60 minutes  Any diabetes medication dose changes were made via the CDE Protocol and Collaborative Practice Agreement with the patient's endocrinology provider. A copy of this encounter was shared with the provider.

## 2018-03-09 ENCOUNTER — TELEPHONE (OUTPATIENT)
Dept: EDUCATION SERVICES | Facility: CLINIC | Age: 58
End: 2018-03-09

## 2018-03-09 DIAGNOSIS — Z79.4 TYPE 2 DIABETES MELLITUS WITH DIABETIC NEUROPATHY, WITH LONG-TERM CURRENT USE OF INSULIN (H): Primary | ICD-10-CM

## 2018-03-09 DIAGNOSIS — E11.40 TYPE 2 DIABETES MELLITUS WITH DIABETIC NEUROPATHY, WITH LONG-TERM CURRENT USE OF INSULIN (H): Primary | ICD-10-CM

## 2018-03-09 NOTE — TELEPHONE ENCOUNTER
Please see CDE encounter note from yesterday. Pt requesting personal Nina. Rx pended and routed to Dr. Meraz for signature (on behalf of Diana Quiles NP).    Lucía Chávez RD, CDE  Diabetes

## 2018-03-09 NOTE — PATIENT INSTRUCTIONS
My Diabetes Care Goals:    1. Meal Plan Recommendation: Aim for 3-4 carb servings at meals and 0-1 carb servings at snacks. Limit/avoid  sweets. Consider joining weight watchers.  2. Will pend order for personal Nina for provider signature (Dr. Meraz). Pt does not meet all criteria for Medicare part B. Per recommendation of [employer] rep- Rx can be sent to [facility] with note to pharmacy instructing to run thru Medicare Part D to see if covered. Otherwise- cash option with use of singlecare discount.  3. Recommend increase Lantus insulin from 18 units --> 22 units. Need continued adjustment.  4. Return in 2 weeks. CDE appt scheduled for 3/22/18 at 10:30 am.    Lucía Chávez RD, RHONDA, CDE  Diabetes      Bring blood glucose meter and logbook with you to all doctor and follow-up appointments.     [facility] for the [facility]:  For Your Diabetes Education and Nutrition Appointments Call:  [phone]   For Diabetes Education or Nutrition Related Questions:   Phone: [phone]  E-mail: [email or web address]  Fax: [phone]   If you need a medication refill please contact your pharmacy. Please allow 3 business days for your refills to be completed.    Instructions for emailing the Diabetes Educators    If you need to communicate a non-urgent message to a Diabetes Educator via email, please send to [email or web address].    Please follow the following email guidelines:    Subject line: Secure: your clinic name (example: Secure: [facility])  In the email please include: First name, middle initial, last name and date of birth.    We will be in touch with you within one (1) business day.

## 2018-03-13 ENCOUNTER — HOSPITAL ENCOUNTER (OUTPATIENT)
Dept: CARDIOLOGY | Facility: CLINIC | Age: 58
Discharge: HOME | End: 2018-03-13
Attending: FAMILY MEDICINE | Admitting: FAMILY MEDICINE
Payer: MEDICARE

## 2018-03-13 VITALS — HEIGHT: 65 IN | WEIGHT: 242.51 LBS | BODY MASS INDEX: 40.4 KG/M2

## 2018-03-13 DIAGNOSIS — R07.89 ATYPICAL CHEST PAIN: ICD-10-CM

## 2018-03-13 PROCEDURE — 25000128 H RX IP 250 OP 636

## 2018-03-13 PROCEDURE — 93350 STRESS TTE ONLY: CPT | Mod: 26 | Performed by: INTERNAL MEDICINE

## 2018-03-13 PROCEDURE — 40000264 ECHO DOBUTAMINE STRESS TEST WITH LUMASON

## 2018-03-13 PROCEDURE — 93321 DOPPLER ECHO F-UP/LMTD STD: CPT | Mod: 26 | Performed by: INTERNAL MEDICINE

## 2018-03-13 PROCEDURE — 25500064 ZZH RX 255 OP 636: Performed by: FAMILY MEDICINE

## 2018-03-13 PROCEDURE — 93325 DOPPLER ECHO COLOR FLOW MAPG: CPT | Mod: 26 | Performed by: INTERNAL MEDICINE

## 2018-03-13 PROCEDURE — 93016 CV STRESS TEST SUPVJ ONLY: CPT | Performed by: INTERNAL MEDICINE

## 2018-03-13 PROCEDURE — 93018 CV STRESS TEST I&R ONLY: CPT | Performed by: INTERNAL MEDICINE

## 2018-03-13 RX ORDER — FLASH GLUCOSE SENSOR
1 KIT MISCELLANEOUS PRN
Qty: 1 DEVICE | Refills: 1 | Status: SHIPPED | OUTPATIENT
Start: 2018-03-13 | End: 2018-12-14

## 2018-03-13 RX ORDER — DOBUTAMINE HYDROCHLORIDE 200 MG/100ML
INJECTION INTRAVENOUS
Status: COMPLETED
Start: 2018-03-13 | End: 2018-03-13

## 2018-03-13 RX ORDER — FLASH GLUCOSE SENSOR
KIT MISCELLANEOUS
Qty: 3 EACH | Refills: 6 | Status: SHIPPED | OUTPATIENT
Start: 2018-03-13 | End: 2018-12-14

## 2018-03-13 RX ADMIN — DOBUTAMINE IN DEXTROSE 500000 MCG: 200 INJECTION, SOLUTION INTRAVENOUS at 12:41

## 2018-03-13 RX ADMIN — SULFUR HEXAFLUORIDE 5 ML: KIT at 13:28

## 2018-03-13 NOTE — PROGRESS NOTES
Pt tolerated test.  Vitals stable, HR 93, 143/82.  Pt denies chest pain at this time.  Pt returns to baseline.

## 2018-03-15 ENCOUNTER — DOCUMENTATION ONLY (OUTPATIENT)
Dept: ENDOCRINOLOGY | Facility: CLINIC | Age: 58
End: 2018-03-15
Payer: COMMERCIAL

## 2018-03-15 PROCEDURE — 95251 CONT GLUC MNTR ANALYSIS I&R: CPT | Performed by: CLINICAL NURSE SPECIALIST

## 2018-03-15 NOTE — PROGRESS NOTES
LibrePro Continuous Glucose Monitor Interpretation      Patient History:   1. Type of Diabetes: Type 2 diabetes  2. Duration of diabetes:  25-30 yrs   3. Current treatment regimen:       Diabetes Medication(s)     Biguanides Sig     metFORMIN (GLUCOPHAGE) 1000 MG tablet Take 1 tablet (1,000 mg) by mouth 2 times daily (with meals)    Insulin Sig     insulin glargine (LANTUS SOLOSTAR) 100 UNIT/ML injection 18 units qd.  Increase as directed to max dose of 45 units qd.    Sulfonylureas Sig     glipiZIDE (GLUCOTROL XL) 10 MG 24 hr tablet Take 2 tablets (20 mg) by mouth every morning         Currently taking 18 units Lantus  Has difficulty with BG testing due to tremors, interested in the personal Nina     Problems taking diabetes medications regularly? No    Side effects? No   4. Most recent A1c values:        Lab Results   Component Value Date     A1C 9.0 2017     A1C 8.5 2017     A1C 10.0 2017     A1C 7.7 2016      5. Indication/s for LibrePro study: Difficult to manage hypoglycemia and/or hyperglycemia, despite multiple adjustments in self-monitoring of blood glucose and diabetes medication administration and lack of BG data.            Statistics:   1. Sensor worn for 14 days.  2. Statistics:    Percent in target is: 2%  Percent above target is: 98%  (percent above 250 mg/dl: 85%)  Percent below target is: 0%  3.  Estimated A1c: 13.8%      Data evaluation:   Ambulatory Glucose Profile (AGP) shows the followin. Consistent day-to-day patterns noted: pattern of daytime hyperglycemia  2. pattern of nocturnal hyperglycemia.  3. Average glucose: 350 mg/dL.  SD (Standard Deviation): 86  4. Hypoglycemia: None     Patient's Logbook shows the following:  Pt did not keep a log  Carbohydrate counting is: inaccurate, pt admits to excessive snacking on cookies and other sweets  Medication and/or insulin dosing is: accurate, would benefit from increase in basal insulin     Education  provided:  ASHKAN Self-Care Behaviors:  Healthy Eating: carbohydrate counting, consistency in amount, composition, and timing of food intake, portion control and behavior change process  Monitoring: benefits of personal CGM, provided over view of Nina  Taking Medication: insulin dose adjustments  Problem Solving: high blood glucose - causes, signs/symptoms, treatment and prevention  Reducing Risks: prevention, early diagnostic measures and treatment of complications and A1C - goals, relating to blood glucose levels, how often to check  Healthy Coping: recognize feelings about diagnosis and utilize support systems- wife is supportive of pt making lifestyle changes, pt notes she's very careful with diet due to pre-diabetes     Opportunities for ongoing education and support in diabetes-self management were discussed.        Educational and other materials:  Copy of CGM report     Assessment and Plan:  1. Meal Plan Recommendation: Aim for 3-4 carb servings at meals and 0-1 carb servings at snacks. Add breakfast (oatmeal). Limit/avoid  sweets. Increase salads and vegetables. Consider joining weight watchers.  2. Will pend order for personal Nina for provider signature (Dr. Meraz). Pt does not meet all criteria for Medicare part B. Per recommendation of [employer] rep- Rx can be sent to [facility] with note to pharmacy instructing to run thru Medicare Part D to see if covered. Otherwise- cash option with use of singlecare discount.  3. Recommend increase Lantus insulin from 18 units --> 22 units. Needs continued adjustment.  4. Return in 2 weeks. CDE appt scheduled for 3/22/18 at 10:30 am.    Diana Butler NP  Endocrinology   Lucía Chávez RD, CDE  Diabetes   [facility]

## 2018-03-22 ENCOUNTER — ALLIED HEALTH/NURSE VISIT (OUTPATIENT)
Dept: EDUCATION SERVICES | Facility: CLINIC | Age: 58
End: 2018-03-22
Payer: COMMERCIAL

## 2018-03-22 DIAGNOSIS — E11.40 TYPE 2 DIABETES MELLITUS WITH DIABETIC NEUROPATHY, WITH LONG-TERM CURRENT USE OF INSULIN (H): Primary | ICD-10-CM

## 2018-03-22 DIAGNOSIS — Z79.4 TYPE 2 DIABETES MELLITUS WITH DIABETIC NEUROPATHY, WITH LONG-TERM CURRENT USE OF INSULIN (H): Primary | ICD-10-CM

## 2018-03-22 PROCEDURE — G0108 DIAB MANAGE TRN  PER INDIV: HCPCS | Performed by: DIETITIAN, REGISTERED

## 2018-03-22 NOTE — MR AVS SNAPSHOT
After Visit Summary   3/22/2018    Steve Morgan    MRN: [id number]           Patient Information     Date Of Birth          1960        Visit Information        Provider Department      3/22/2018 1:30 PM Lucía Chávez [facility]        Today's Diagnoses     Type 2 diabetes mellitus with diabetic neuropathy, with long-term current use of insulin (H)    -  1       Follow-ups after your visit        Your next 10 appointments already scheduled     Apr 12, 2018 12:30 PM CDT   Diabetic Education with Lucía Chávez   [facility] ([facility])    [address] [facility] [id number] [phone]            May 08, 2018 11:30 AM CDT   Return Visit with [facility] ([facility])    [address]  [facility] [address] [phone]              Who to contact     If you have questions or need follow up information about today's clinic visit or your schedule please contact [facility] directly at [phone].  Normal or non-critical lab and imaging results will be communicated to you by NeurOphart, letter or phone within 4 business days after the clinic has received the results. If you do not hear from us within 7 days, please contact the clinic through NeurOphart or phone. If you have a critical or abnormal lab result, we will notify you by phone as soon as possible.  Submit refill requests through PingTank or call your pharmacy and they will forward the refill request to us. Please allow 3 business days for your refill to be completed.          Additional Information About Your Visit        MyChart Information     PingTank gives you secure access to your electronic health record. If you see a primary care provider, you can also send messages to your care team and make appointments. If you have questions, please call  your primary care clinic.  If you do not have a primary care provider, please call [phone] and they will assist you.        Care EveryWhere ID     This is your Care EveryWhere ID. This could be used by other organizations to access your [place] medical records  [id number]         Blood Pressure from Last 3 Encounters:   02/06/18 136/78   01/31/18 132/80   01/08/18 138/84    Weight from Last 3 Encounters:   03/13/18 110 kg (242 lb 8.1 oz)   02/06/18 109.7 kg (241 lb 12.8 oz)   01/31/18 110.2 kg (243 lb)              We Performed the Following     DIABETES EDUCATION - Individual  []        Primary Care Provider Office Phone # Fax #    Lisa Pierre PA-C [phone] [address] [facility] [address]        Equal Access to Services     [facility]: Hadii aad ku hadasho Soomaali, waaxda luqadaha, qaybta kaalmada adeegyada, waxay manuel haynikolasn dianna gimenez . So [facility] [phone].    ATENCIÓN: Si habla español, tiene a adler disposición servicios gratuitos de asistencia lingüística. Llame al [phone].    We comply with applicable federal civil rights laws and [place] laws. We do not discriminate on the basis of race, color, national origin, age, disability, sex, sexual orientation, or gender identity.            Thank you!     Thank you for choosing [place] [facility]  for your care. Our goal is always to provide you with excellent care. Hearing back from our patients is one way we can continue to improve our services. Please take a few minutes to complete the written survey that you may receive in the mail after your visit with us. Thank you!             Your Updated Medication List - Protect others around you: Learn how to safely use, store and throw away your medicines at www.disposemymeds.org.          This list is accurate as of 3/22/18  4:41 PM.  Always use your most recent med list.                   Brand Name Dispense Instructions for use  Diagnosis    ammonium lactate 12 % cream    LAC-HYDRIN    385 g    Apply topically 2 times daily as needed for dry skin    Diabetic polyneuropathy associated with type 2 diabetes mellitus (H), Pre-ulcerative calluses, Pes planus of both feet       aspirin 81 MG tablet     100    ONE DAILY        atenolol 25 MG tablet    TENORMIN    90 tablet    Take 1 tablet (25 mg) by mouth every morning    Essential hypertension with goal blood pressure less than 140/90       * blood glucose monitoring lancets     1 Box    Use to test blood sugars 2 times daily or as directed.    Type 2 diabetes, HbA1C goal < 8% (H)       * blood glucose monitoring lancets     100 each    Use to test blood sugar 3 times daily or as directed.    Type 2 diabetes mellitus with diabetic neuropathy, unspecified long term insulin use status (H)       * blood glucose monitoring test strip    ONETOUCH VERIO IQ    100 strip    Use to test blood sugars 2 times daily or as directed.    Type 2 diabetes, HbA1C goal < 8% (H)       * blood glucose monitoring test strip    KERLINE CONTOUR NEXT    100 strip    Use to test blood sugar 3 times daily or as directed.    Type 2 diabetes mellitus with diabetic neuropathy, unspecified long term insulin use status (H)       buPROPion 300 MG 24 hr tablet    WELLBUTRIN XL    90 tablet    Take 1 tablet (300 mg) by mouth every morning    Excessive anger       CALCIUM 600 + D PO      Take 1 tablet by mouth daily        citalopram 20 MG tablet    celeXA    90 tablet    Take 1 tablet (20 mg) by mouth daily    Excessive anger       continuous blood glucose monitoring sensor     3 each    For use with Freestyle Nina Flash  for continuous monitioring of blood glucose levels. Replace sensor every 10 days.    Type 2 diabetes mellitus with diabetic neuropathy, with long-term current use of insulin (H)       ferrous sulfate 325 (65 FE) MG tablet    IRON     Take 1 tablet by mouth daily (with breakfast).        finasteride 5 MG  tablet    PROSCAR    90 tablet    Take 1 tablet (5 mg) by mouth daily    Benign prostatic hyperplasia with urinary hesitancy       FREESTYLE GIULIANA READER Georgette     1 Device    1 kit as needed    Type 2 diabetes mellitus with diabetic neuropathy, with long-term current use of insulin (H)       glipiZIDE 10 MG 24 hr tablet    GLUCOTROL XL    180 tablet    Take 2 tablets (20 mg) by mouth every morning    Type 2 diabetes mellitus with diabetic neuropathy, without long-term current use of insulin (H)       hydrocortisone 0.2 % cream    WESTCORT    45 g    Apply topically 2 times daily as needed Apply sparingly to affected area, BID.    Dermatitis       insulin pen needle 31G X 5 MM    B-D U/F    100 each    Use 1 daily or as directed.    Type 2 diabetes mellitus with diabetic neuropathy, unspecified long term insulin use status (H)       * LANTUS SOLOSTAR 100 UNIT/ML injection   Generic drug:  insulin glargine     15 mL    Inject 18 units SQ daily    Type 2 diabetes mellitus with diabetic neuropathy, unspecified long term insulin use status (H)       * insulin glargine 100 UNIT/ML injection    LANTUS SOLOSTAR    45 mL    22 units qd.  Increase as directed to max dose of 45 units qd.    Type 2 diabetes mellitus with diabetic neuropathy, with long-term current use of insulin (H)       LEVOXYL 175 MCG tablet   Generic drug:  levothyroxine     90 tablet    Take 1 tablet (175 mcg) by mouth daily    Hypothyroidism due to acquired atrophy of thyroid       losartan 50 MG tablet    COZAAR    90 tablet    TAKE 1 TABLET DAILY    Essential hypertension with goal blood pressure less than 140/90       metFORMIN 1000 MG tablet    GLUCOPHAGE    180 tablet    Take 1 tablet (1,000 mg) by mouth 2 times daily (with meals)    Type 2 diabetes mellitus with diabetic neuropathy, without long-term current use of insulin (H)       Multi-vitamin Tabs tablet   Generic drug:  multivitamin, therapeutic with minerals     30    1 TABLET DAILY         omeprazole-sodium bicarbonate  MG per capsule    ZEGERID     Take 1 capsule by mouth every morning (before breakfast)        order for DME     1 Units    Equipment being ordered: single cane with rubber foot    Type 2 diabetes, HbA1C goal < 8% (H), Neuropathy in diabetes (H), Unsteady gait       order for DME     1 Units    Equipment being ordered: four pronged cane    Unsteady gait, Neuropathy in diabetes (H), Type 2 diabetes, HbA1C goal < 8% (H)       simvastatin 20 MG tablet    ZOCOR    90 tablet    Take 1 tablet (20 mg) by mouth At Bedtime    Hyperlipidemia LDL goal <100       UNABLE TO FIND      MEDICATION NAME: Prednisolone Acetate, Gatifloxacin and Bromfenac 1/0.5/0.075% - instill one drop in the surgery eye three times daily beginning 2 days before surgery        VITAMIN B 12 PO      Take 250 mcg by mouth daily    Excessive anger       VITAMIN D (CHOLECALCIFEROL) PO      Take 1,000 Units by mouth daily.        * Notice:  This list has 6 medication(s) that are the same as other medications prescribed for you. Read the directions carefully, and ask your doctor or other care provider to review them with you.

## 2018-03-22 NOTE — PROGRESS NOTES
Diabetes Self Management Training: Personal Continuous Glucose Monitor Start    Steve Morgan presents today for initiation of personal continuous glucose monitoring with patient-owned device related to Type 2 diabetes.    He is accompanied by self    Patient's diabetes management related comments/concerns: has been more mindful of what he buys at the store (less sweets), however    Patient would like this visit to be focused around the following diabetes-related behaviors and goals: personal CGM    ASSESSMENT:    Current Diabetes Management per Patient:      Diabetes Medication(s)     Biguanides Sig    metFORMIN (GLUCOPHAGE) 1000 MG tablet Take 1 tablet (1,000 mg) by mouth 2 times daily (with meals)    Insulin Sig    insulin glargine (LANTUS SOLOSTAR) 100 UNIT/ML injection 22 units qd.  Increase as directed to max dose of 45 units qd.    Sulfonylureas Sig    glipiZIDE (GLUCOTROL XL) 10 MG 24 hr tablet Take 2 tablets (20 mg) by mouth every morning        Patient glucose self monitoring as follows: rarely.     BG values are: unable to assess  Patient's most recent   Lab Results   Component Value Date    A1C 9.0 12/08/2017    is not meeting goal of <7.0    Patient experiencing hypoglycemia? no    Nutrition:  Patient eats 3 meals per day and has an inconsistent intake of carbohydrates, has been trying to make healthier choices while at the grocery store.    Physical Activity:  No regular exercise  Want to start walking more when weather gets nicer    CGM Preparation:  Patient completed homework (online training and/or Getting started with CGM guide)? No    Labs:  Lab Results   Component Value Date    A1C 9.0 12/08/2017     Lab Results   Component Value Date     02/01/2018       INTERVENTION:    Patient was able to demonstrate ability to insert and start sensor without difficulty.    Education provided on:  Arteris Nina System - Nina sensor, reader with built in glucometer, sensor glucose versus blood  glucose, trends and graphs, sensor insertion and when to change, 12 hour warm-up period, need to scan sensor every 8 hours for complete data accessibility, when to do a blood glucose check for result verification, LibreView software and data uploading    CGM initial settings:   Freestyle Nina: Target Glucose Range: 70 - 180 mg/dL    PLAN:  Change sensor, as directed.  Scan monitor before/ after meals, bedtime, etc.   Discussed use of LibreView software.     Follow-up:    Follow-up appointment scheduled on 4/12/18.  Chart routed to referring provider.    Lucía Chávez RD, CDE  Diabetes     Time Spent: 45 minutes  Encounter Type: Individual    Any diabetes medication dose changes were made via the CDE Protocol and Collaborative Practice Agreement with the patient's primary care provider. A copy of this encounter was shared with the provider.

## 2018-03-22 NOTE — LETTER
3/22/2018         RE: Steve Morgan  [address]  [facility] [address]        Dear Colleague,    Thank you for referring your patient, Steve Morgan, to the [facility]. Please see a copy of my visit note below.    Diabetes Self Management Training: Personal Continuous Glucose Monitor Start    Steve Morgan presents today for initiation of personal continuous glucose monitoring with patient-owned device related to Type 2 diabetes.    He is accompanied by self    Patient's diabetes management related comments/concerns: has been more mindful of what he buys at the store (less sweets), however    Patient would like this visit to be focused around the following diabetes-related behaviors and goals: personal CGM    ASSESSMENT:    Current Diabetes Management per Patient:      Diabetes Medication(s)     Biguanides Sig    metFORMIN (GLUCOPHAGE) 1000 MG tablet Take 1 tablet (1,000 mg) by mouth 2 times daily (with meals)    Insulin Sig    insulin glargine (LANTUS SOLOSTAR) 100 UNIT/ML injection 22 units qd.  Increase as directed to max dose of 45 units qd.    Sulfonylureas Sig    glipiZIDE (GLUCOTROL XL) 10 MG 24 hr tablet Take 2 tablets (20 mg) by mouth every morning        Patient glucose self monitoring as follows: rarely.     BG values are: unable to assess  Patient's most recent   Lab Results   Component Value Date    A1C 9.0 12/08/2017    is not meeting goal of <7.0    Patient experiencing hypoglycemia? no    Nutrition:  Patient eats 3 meals per day and has an inconsistent intake of carbohydrates, has been trying to make healthier choices while at the grocery store.    Physical Activity:  No regular exercise  Want to start walking more when weather gets nicer    CGM Preparation:  Patient completed homework (online training and/or Getting started with CGM guide)? No    Labs:  Lab Results   Component Value Date    A1C 9.0 12/08/2017     Lab Results   Component Value Date    GLC  274 02/01/2018       INTERVENTION:    Patient was able to demonstrate ability to insert and start sensor without difficulty.    Education provided on:  Freestyle Nina System - Nina sensor, reader with built in glucometer, sensor glucose versus blood glucose, trends and graphs, sensor insertion and when to change, 12 hour warm-up period, need to scan sensor every 8 hours for complete data accessibility, when to do a blood glucose check for result verification, LibreView software and data uploading    CGM initial settings:   Freestyle Nina: Target Glucose Range: 70 - 180 mg/dL    PLAN:  Change sensor, as directed.  Scan monitor before/ after meals, bedtime, etc.   Discussed use of LibreView software.     Follow-up:    Follow-up appointment scheduled on 4/12/18.  Chart routed to referring provider.    Lucía Chávez RD, CDE  Diabetes     Time Spent: 45 minutes  Encounter Type: Individual    Any diabetes medication dose changes were made via the CDE Protocol and Collaborative Practice Agreement with the patient's primary care provider. A copy of this encounter was shared with the provider.

## 2018-03-22 NOTE — Clinical Note
Sammy Ortiz, Just fyi- we started Caio's personal Nina at today's visit.   Thanks! Lucía Chávez RD, CDE Diabetes

## 2018-04-04 ENCOUNTER — ALLIED HEALTH/NURSE VISIT (OUTPATIENT)
Dept: EDUCATION SERVICES | Facility: CLINIC | Age: 58
End: 2018-04-04
Payer: COMMERCIAL

## 2018-04-04 DIAGNOSIS — E11.40 TYPE 2 DIABETES MELLITUS WITH DIABETIC NEUROPATHY, WITH LONG-TERM CURRENT USE OF INSULIN (H): Primary | ICD-10-CM

## 2018-04-04 DIAGNOSIS — Z79.4 TYPE 2 DIABETES MELLITUS WITH DIABETIC NEUROPATHY, WITH LONG-TERM CURRENT USE OF INSULIN (H): Primary | ICD-10-CM

## 2018-04-04 PROCEDURE — G0108 DIAB MANAGE TRN  PER INDIV: HCPCS | Performed by: DIETITIAN, REGISTERED

## 2018-04-04 NOTE — PROGRESS NOTES
"Diabetes Self Management Training: Follow-up Visit    Steve Morgan presents today for assistance with and download of continuous glucose monitoring related to Type 2 diabetes.    He is accompanied by self    Patient's diabetes management related comments/concerns: reports that he had trouble inserting his second sensor on his own, brought in two devices (one was mangled up, the other was ready to insert- but pt thought it was also defective). Reports that he really likes the personal Nina CGM as it has helped become much more self aware.     Patient would like this visit to be focused around the following diabetes-related behaviors and goals: Monitoring    ASSESSMENT:  Patient Problem List reviewed for relevant medical history and current medical status.    Current Diabetes Management per Patient:       Diabetes Medication(s)     Biguanides Sig    metFORMIN (GLUCOPHAGE) 1000 MG tablet Take 1 tablet (1,000 mg) by mouth 2 times daily (with meals)    Insulin Sig    insulin glargine (LANTUS SOLOSTAR) 100 UNIT/ML injection 22 units qd.  Increase as directed to max dose of 45 units qd.    insulin glargine (LANTUS SOLOSTAR) 100 UNIT/ML injection Inject 18 units SQ daily    Sulfonylureas Sig    glipiZIDE (GLUCOTROL XL) 10 MG 24 hr tablet Take 2 tablets (20 mg) by mouth every morning        Pt notes has missed several Metformin and Glipizide doses over the past week- \"forget to take in the morning because he often misses breakfast\"    Patient glucose self monitoring as follows: pt is not doing finger stick glucose testing due to tremors.       Personal Nina download:        BG values are: Not in goal - however overall BG avg has decreased from 350 mg/dl during recent diagnostic CGM wear down to the current avg of 237 mg/dl     Patient's most recent   Lab Results   Component Value Date    A1C 9.0 12/08/2017    is not meeting goal of <7.0    Nutrition:  Patient doing better with reducing sweets, etc- trying to avoid buying " "them. Pt seeking additional classes/ support. Previously went to [employer].    Physical Activity:    Not discussed    Diabetes Complications:  Not discussed today.    Vitals:  There were no vitals taken for this visit.  Estimated body mass index is 40.36 kg/(m^2) as calculated from the following:    Height as of 3/13/18: 1.651 m (5' 5\").    Weight as of 3/13/18: 110 kg (242 lb 8.1 oz).   Last 3 BP:   BP Readings from Last 3 Encounters:   02/06/18 136/78   01/31/18 132/80   01/08/18 138/84       History   Smoking Status     Never Smoker   Smokeless Tobacco     Never Used       Labs:  Lab Results   Component Value Date    A1C 9.0 12/08/2017     Lab Results   Component Value Date     02/01/2018     Lab Results   Component Value Date     05/17/2017     HDL Cholesterol   Date Value Ref Range Status   05/17/2017 30 (L) >39 mg/dL Final   ]  GFR Estimate   Date Value Ref Range Status   02/01/2018 79 >60 mL/min/1.7m2 Final     Comment:     Non  GFR Calc     GFR Estimate If Black   Date Value Ref Range Status   02/01/2018 >90 >60 mL/min/1.7m2 Final     Comment:      GFR Calc     Lab Results   Component Value Date    CR 0.97 02/01/2018     No results found for: MICROALBUMIN    Health Beliefs and Attitudes:   Patient Activation Measure Survey Score:  SHANNON Score (Last Two) 3/8/2011 3/30/2017   SHANNON Raw Score 44 29   Activation Score 70.8 52.9   SHANNON Level 4 2       Stage of Change: ACTION (Actively working towards change)    Diabetes knowledge and skills assessment:     Patient is knowledgeable in diabetes management concepts related to: Healthy Eating, Being Active, Monitoring, Taking Medication, Problem Solving, Reducing Risks and Healthy Coping    Patient needs further education on the following diabetes management concepts: Healthy Eating, Monitoring and Taking Medication    Barriers to Learning Assessment: No Barriers identified    Based on learning assessment above, most " appropriate setting for further diabetes education would be: Group class or Individual setting.    INTERVENTION:    Education provided today on:  AADE Self-Care Behaviors:  Healthy Eating: hunger effects of hyperglycemia, additional resources (classes, ADA, etc)  Monitoring: reviewed sensor insertion technique, reviewed CGM report  Taking Medication: reinforced importance of adherence, consider benefits of using a GLP-1 if appropriate for patient- discussed use of and provided brief demo of Victoza, pt receptive and may want to discuss further at a future visit- but prefers to work on adherence to current regimen at this time.   Opportunities for ongoing education and support in diabetes-self management were discussed.    Pt verbalized understanding of concepts discussed and recommendations provided today.       Education Materials Provided:  Diabetes Class Flyer    PLAN:  See Patient Instructions for co-developed, patient-stated behavior change goals.  AVS printed and provided to patient today.    FOLLOW-UP:  Follow-up appointment scheduled 2 weeks.  Chart routed to referring provider.    Ongoing plan for education and support: Online diabetes websites/forums, Written resources (magazines, books, etc.), Follow-up visit with diabetes educator  and Follow-up with primary care provider    Lucía Chávez RD, CDE  Diabetes     Time Spent: 40 minutes  Encounter Type: Individual    Any diabetes medication dose changes were made via the CDE Protocol and Collaborative Practice Agreement with the patient's primary care provider. A copy of this encounter was shared with the provider.

## 2018-04-04 NOTE — LETTER
"    4/4/2018         RE: Steve Morgan  [id number]  [facility] [address]        Dear Colleague,    Thank you for referring your patient, Steve Morgan, to the [facility]. Please see a copy of my visit note below.    Diabetes Self Management Training: Follow-up Visit    Steve Morgan presents today for assistance with and download of continuous glucose monitoring related to Type 2 diabetes.    He is accompanied by self    Patient's diabetes management related comments/concerns: reports that he had trouble inserting his second sensor on his own, brought in two devices (one was mangled up, the other was ready to insert- but pt thought it was also defective). Reports that he really likes the personal Nina CGM as it has helped become much more self aware.     Patient would like this visit to be focused around the following diabetes-related behaviors and goals: Monitoring    ASSESSMENT:  Patient Problem List reviewed for relevant medical history and current medical status.    Current Diabetes Management per Patient:       Diabetes Medication(s)     Biguanides Sig    metFORMIN (GLUCOPHAGE) 1000 MG tablet Take 1 tablet (1,000 mg) by mouth 2 times daily (with meals)    Insulin Sig    insulin glargine (LANTUS SOLOSTAR) 100 UNIT/ML injection 22 units qd.  Increase as directed to max dose of 45 units qd.    insulin glargine (LANTUS SOLOSTAR) 100 UNIT/ML injection Inject 18 units SQ daily    Sulfonylureas Sig    glipiZIDE (GLUCOTROL XL) 10 MG 24 hr tablet Take 2 tablets (20 mg) by mouth every morning        Pt notes has missed several Metformin and Glipizide doses over the past week- \"forget to take in the morning because he often misses breakfast\"    Patient glucose self monitoring as follows: pt is not doing finger stick glucose testing due to tremors.       Personal Nina download:        BG values are: Not in goal - however overall BG avg has decreased from 350 mg/dl during recent " "diagnostic CGM wear down to the current avg of 237 mg/dl     Patient's most recent   Lab Results   Component Value Date    A1C 9.0 12/08/2017    is not meeting goal of <7.0    Nutrition:  Patient doing better with reducing sweets, etc- trying to avoid buying them. Pt seeking additional classes/ support. Previously went to [employer].    Physical Activity:    Not discussed    Diabetes Complications:  Not discussed today.    Vitals:  There were no vitals taken for this visit.  Estimated body mass index is 40.36 kg/(m^2) as calculated from the following:    Height as of 3/13/18: 1.651 m (5' 5\").    Weight as of 3/13/18: 110 kg (242 lb 8.1 oz).   Last 3 BP:   BP Readings from Last 3 Encounters:   02/06/18 136/78   01/31/18 132/80   01/08/18 138/84       History   Smoking Status     Never Smoker   Smokeless Tobacco     Never Used       Labs:  Lab Results   Component Value Date    A1C 9.0 12/08/2017     Lab Results   Component Value Date     02/01/2018     Lab Results   Component Value Date     05/17/2017     HDL Cholesterol   Date Value Ref Range Status   05/17/2017 30 (L) >39 mg/dL Final   ]  GFR Estimate   Date Value Ref Range Status   02/01/2018 79 >60 mL/min/1.7m2 Final     Comment:     Non  GFR Calc     GFR Estimate If Black   Date Value Ref Range Status   02/01/2018 >90 >60 mL/min/1.7m2 Final     Comment:      GFR Calc     Lab Results   Component Value Date    CR 0.97 02/01/2018     No results found for: MICROALBUMIN    Health Beliefs and Attitudes:   Patient Activation Measure Survey Score:  SHANNON Score (Last Two) 3/8/2011 3/30/2017   SHANNON Raw Score 44 29   Activation Score 70.8 52.9   SHANNON Level 4 2       Stage of Change: ACTION (Actively working towards change)    Diabetes knowledge and skills assessment:     Patient is knowledgeable in diabetes management concepts related to: Healthy Eating, Being Active, Monitoring, Taking Medication, Problem Solving, Reducing " Risks and Healthy Coping    Patient needs further education on the following diabetes management concepts: Healthy Eating, Monitoring and Taking Medication    Barriers to Learning Assessment: No Barriers identified    Based on learning assessment above, most appropriate setting for further diabetes education would be: Group class or Individual setting.    INTERVENTION:    Education provided today on:  AADE Self-Care Behaviors:  Healthy Eating: hunger effects of hyperglycemia, additional resources (classes, ADA, etc)  Monitoring: reviewed sensor insertion technique, reviewed CGM report  Taking Medication: reinforced importance of adherence, consider benefits of using a GLP-1 if appropriate for patient- discussed use of and provided brief demo of Victoza, pt receptive and may want to discuss further at a future visit- but prefers to work on adherence to current regimen at this time.   Opportunities for ongoing education and support in diabetes-self management were discussed.    Pt verbalized understanding of concepts discussed and recommendations provided today.       Education Materials Provided:  Diabetes Class Flyer    PLAN:  See Patient Instructions for co-developed, patient-stated behavior change goals.  AVS printed and provided to patient today.    FOLLOW-UP:  Follow-up appointment scheduled 2 weeks.  Chart routed to referring provider.    Ongoing plan for education and support: Online diabetes websites/forums, Written resources (magazines, books, etc.), Follow-up visit with diabetes educator  and Follow-up with primary care provider    Lucía Chávez RD, MELISAE  Diabetes     Time Spent: 40 minutes  Encounter Type: Individual    Any diabetes medication dose changes were made via the CDE Protocol and Collaborative Practice Agreement with the patient's primary care provider. A copy of this encounter was shared with the provider.

## 2018-04-04 NOTE — PATIENT INSTRUCTIONS
My Diabetes Care Goals:    1. Continue to work on portion control. Check on additional resources (classes, American Diabetes Association, Weight [employer], etc).    2. Take your medication every day as directed. Include a small breakfast to help remind you to take all of your medicine. This may also help you feel less hungry later on in the day.    3. Call [employer] (maker of the Nina) to report defective sensor. If they do not replace free of change- let me know.     4. Can consider Victoza or other GLP-1 at a future visit, if Diana Butler feels this might be appropriate for you. For now as requested- let's concentrate on staying consistent with your current regimen and not missing any medication doses.     Follow up:  Follow-up diabetes education appointment scheduled on 4/18/18 at 10:30 am. Bring your Nina reader.     Call ([phone]), e-mail ([email or web address]), or send [employer] message with questions, concerns or if follow-up is needed.    Lucía Chávez RD, LD, CDE  Diabetes      Bring blood glucose meter and logbook with you to all doctor and follow-up appointments.     [facility] for the [facility] Area:  For Your Diabetes Education and Nutrition Appointments Call:  [phone]   For Diabetes Education or Nutrition Related Questions:   Phone: [phone]  E-mail: [email or web address]  Fax: [phone]   If you need a medication refill please contact your pharmacy. Please allow 3 business days for your refills to be completed.    Instructions for emailing the Diabetes Educators    If you need to communicate a non-urgent message to a Diabetes Educator via email, please send to [email or web address].    Please follow the following email guidelines:    Subject line: Secure: your clinic name (example: [facility]: [facility])  In the email please include: First name, middle initial, last name and date of birth.    We will be in touch with you  within one (1) business day.

## 2018-04-05 ENCOUNTER — TELEPHONE (OUTPATIENT)
Dept: FAMILY MEDICINE | Facility: CLINIC | Age: 58
End: 2018-04-05

## 2018-04-05 NOTE — TELEPHONE ENCOUNTER
Panel Management Review      Patient has the following on his problem list:     Diabetes    ASA: Passed    Last A1C  Lab Results   Component Value Date    A1C 9.0 12/08/2017    A1C 8.5 09/07/2017    A1C 10.0 05/17/2017    A1C 7.7 12/22/2016    A1C 8.8 11/15/2016     A1C tested: FAILED    Last LDL:    Lab Results   Component Value Date    CHOL 192 05/17/2017     Lab Results   Component Value Date    HDL 30 05/17/2017     Lab Results   Component Value Date     05/17/2017     Lab Results   Component Value Date    TRIG 179 05/17/2017     Lab Results   Component Value Date    CHOLHDLRATIO 4.6 05/21/2015     Lab Results   Component Value Date    NHDL 162 05/17/2017       Is the patient on a Statin? YES             Is the patient on Aspirin? YES    Medications     HMG CoA Reductase Inhibitors    simvastatin (ZOCOR) 20 MG tablet    Salicylates    ASPIRIN 81 MG OR TABS          Last three blood pressure readings:  BP Readings from Last 3 Encounters:   02/06/18 136/78   01/31/18 132/80   01/08/18 138/84       Date of last diabetes office visit: 2/6/18     Tobacco History:     History   Smoking Status     Never Smoker   Smokeless Tobacco     Never Used           Composite cancer screening  Chart review shows that this patient is due/due soon for the following None  Summary:    Patient is due/failing the following:   A1C and LDL    Action needed:   Patient needs office visit for Diabetic check.    Type of outreach:    None, patient has pending appointment with Diana Butler NP next month    Questions for provider review:    None                                                                                                                                    Brian Patel MA       Chart routed to none .

## 2018-04-18 ENCOUNTER — ALLIED HEALTH/NURSE VISIT (OUTPATIENT)
Dept: EDUCATION SERVICES | Facility: CLINIC | Age: 58
End: 2018-04-18
Payer: COMMERCIAL

## 2018-04-18 DIAGNOSIS — E11.40 TYPE 2 DIABETES MELLITUS WITH DIABETIC NEUROPATHY, WITH LONG-TERM CURRENT USE OF INSULIN (H): ICD-10-CM

## 2018-04-18 DIAGNOSIS — Z79.4 TYPE 2 DIABETES MELLITUS WITH DIABETIC NEUROPATHY, WITH LONG-TERM CURRENT USE OF INSULIN (H): ICD-10-CM

## 2018-04-18 PROCEDURE — G0108 DIAB MANAGE TRN  PER INDIV: HCPCS | Performed by: DIETITIAN, REGISTERED

## 2018-04-18 NOTE — PROGRESS NOTES
"Diabetes Self Management Training: Follow-up Visit    Steve Morgan presents today for download of personal continuous glucose monitoring related to Type 2 diabetes.    He is accompanied by self    Patient's diabetes management related comments/concerns: starting a new sensor- wants some guidance    Patient would like this visit to be focused around the following diabetes-related behaviors and goals: Monitoring    ASSESSMENT:  Patient Problem List reviewed for relevant medical history and current medical status.    Current Diabetes Management per Patient:     Diabetes Medication(s)     Biguanides Sig    metFORMIN (GLUCOPHAGE) 1000 MG tablet Take 1 tablet (1,000 mg) by mouth 2 times daily (with meals)    Insulin Sig    insulin glargine (LANTUS SOLOSTAR) 100 UNIT/ML injection 22 units qd.  Increase as directed to max dose of 45 units qd.    Sulfonylureas Sig    glipiZIDE (GLUCOTROL XL) 10 MG 24 hr tablet Take 2 tablets (20 mg) by mouth every morning        Patient glucose self monitoring as follows: using personal Nina CGM, is scanning 2-6x/day.               BG values are: Not in goal  Patient's most recent   Lab Results   Component Value Date    A1C 9.0 12/08/2017    is not meeting goal of <7.0    Nutrition:  Patient reports \"got into the [employer] candy last week\" and noted higher blood sugars. Is aware that he needs to keep working on portion control.     Physical Activity:    Not discussed    Diabetes Complications:  Not discussed today.    Vitals:  There were no vitals taken for this visit.  Estimated body mass index is 40.36 kg/(m^2) as calculated from the following:    Height as of 3/13/18: 1.651 m (5' 5\").    Weight as of 3/13/18: 110 kg (242 lb 8.1 oz).   Last 3 BP:   BP Readings from Last 3 Encounters:   02/06/18 136/78   01/31/18 132/80   01/08/18 138/84       History   Smoking Status     Never Smoker   Smokeless Tobacco     Never Used       Labs:  Lab Results   Component Value Date    A1C 9.0 12/08/2017 "     Lab Results   Component Value Date     02/01/2018     Lab Results   Component Value Date     05/17/2017     HDL Cholesterol   Date Value Ref Range Status   05/17/2017 30 (L) >39 mg/dL Final   ]  GFR Estimate   Date Value Ref Range Status   02/01/2018 79 >60 mL/min/1.7m2 Final     Comment:     Non  GFR Calc     GFR Estimate If Black   Date Value Ref Range Status   02/01/2018 >90 >60 mL/min/1.7m2 Final     Comment:      GFR Calc     Lab Results   Component Value Date    CR 0.97 02/01/2018     No results found for: MICROALBUMIN    Health Beliefs and Attitudes:   Patient Activation Measure Survey Score:  SHANNON Score (Last Two) 3/8/2011 3/30/2017   SHANNON Raw Score 44 29   Activation Score 70.8 52.9   SHANNON Level 4 2       Stage of Change: ACTION (Actively working towards change)    Diabetes knowledge and skills assessment:     Patient is knowledgeable in diabetes management concepts related to: Healthy Eating, Monitoring and Problem Solving    Patient needs further education on the following diabetes management concepts: Healthy Eating, Monitoring and Taking Medication    Barriers to Learning Assessment: No Barriers identified    Based on learning assessment above, most appropriate setting for further diabetes education would be: Individual setting.    INTERVENTION:    Education provided today on:  AADE Self-Care Behaviors:  Healthy Eating: portion control  Monitoring: review of CGM report-  Frequency of scanning to capture all data (minimum q 8 hours)  Taking Medication: discussed insulin dosing- may benefit from increase in Lantus    Patient was able to demonstrate ability to insert and start sensor without difficulty. Needed some reminders on how to load the sensor. May need some assistance with removing the packaging when doing the sensor change at home. Encouraged pt to ask wife for assistance as needed.    Opportunities for ongoing education and support in diabetes-self  management were discussed.    Pt verbalized understanding of concepts discussed and recommendations provided today.       Education Materials Provided:  Copy of CGM report    PLAN:  Recommend increase Lantus insulin from 22 units to --> 26 units.  Continue to work on portion control.  Try to scan sensor minimum of q 8 hrs- ideally before and 2 hrs after meals, bedtime, and if symptomatic of high/low BG.    AVS printed and provided to patient today.    FOLLOW-UP:  Follow-up appointment scheduled 5/23/18 at 10:30.  Chart routed to referring provider.    Ongoing plan for education and support: Written resources (magazines, books, etc.), Follow-up visit with diabetes educator  and Follow-up with primary care provider    Lucía Chávez RD, CDE  Diabetes     Time Spent: 30 minutes  Encounter Type: Individual    Any diabetes medication dose changes were made via the CDE Protocol and Collaborative Practice Agreement with the patient's endocrinology provider. A copy of this encounter was shared with the provider.

## 2018-04-18 NOTE — Clinical Note
Sammy steven, update on Caio. Increased his Lantus by 20%. He continues to struggle with portion control- but is making an effort.   Thanks! Lucía

## 2018-04-18 NOTE — LETTER
"    4/18/2018         RE: Steve Morgan  [id number]  [facility] [address]        Dear Colleague,    Thank you for referring your patient, Steve Morgan, to the [facility]. Please see a copy of my visit note below.    Diabetes Self Management Training: Follow-up Visit    Steve Morgan presents today for download of personal continuous glucose monitoring related to Type 2 diabetes.    He is accompanied by self    Patient's diabetes management related comments/concerns: starting a new sensor- wants some guidance    Patient would like this visit to be focused around the following diabetes-related behaviors and goals: Monitoring    ASSESSMENT:  Patient Problem List reviewed for relevant medical history and current medical status.    Current Diabetes Management per Patient:     Diabetes Medication(s)     Biguanides Sig    metFORMIN (GLUCOPHAGE) 1000 MG tablet Take 1 tablet (1,000 mg) by mouth 2 times daily (with meals)    Insulin Sig    insulin glargine (LANTUS SOLOSTAR) 100 UNIT/ML injection 22 units qd.  Increase as directed to max dose of 45 units qd.    Sulfonylureas Sig    glipiZIDE (GLUCOTROL XL) 10 MG 24 hr tablet Take 2 tablets (20 mg) by mouth every morning        Patient glucose self monitoring as follows: using personal Nina CGM, is scanning 2-6x/day.               BG values are: Not in goal  Patient's most recent   Lab Results   Component Value Date    A1C 9.0 12/08/2017    is not meeting goal of <7.0    Nutrition:  Patient reports \"got into the [employer] candy last week\" and noted higher blood sugars. Is aware that he needs to keep working on portion control.     Physical Activity:    Not discussed    Diabetes Complications:  Not discussed today.    Vitals:  There were no vitals taken for this visit.  Estimated body mass index is 40.36 kg/(m^2) as calculated from the following:    Height as of 3/13/18: 1.651 m (5' 5\").    Weight as of 3/13/18: 110 kg (242 lb 8.1 oz). "   Last 3 BP:   BP Readings from Last 3 Encounters:   02/06/18 136/78   01/31/18 132/80   01/08/18 138/84       History   Smoking Status     Never Smoker   Smokeless Tobacco     Never Used       Labs:  Lab Results   Component Value Date    A1C 9.0 12/08/2017     Lab Results   Component Value Date     02/01/2018     Lab Results   Component Value Date     05/17/2017     HDL Cholesterol   Date Value Ref Range Status   05/17/2017 30 (L) >39 mg/dL Final   ]  GFR Estimate   Date Value Ref Range Status   02/01/2018 79 >60 mL/min/1.7m2 Final     Comment:     Non  GFR Calc     GFR Estimate If Black   Date Value Ref Range Status   02/01/2018 >90 >60 mL/min/1.7m2 Final     Comment:      GFR Calc     Lab Results   Component Value Date    CR 0.97 02/01/2018     No results found for: MICROALBUMIN    Health Beliefs and Attitudes:   Patient Activation Measure Survey Score:  SHANNON Score (Last Two) 3/8/2011 3/30/2017   SHANNON Raw Score 44 29   Activation Score 70.8 52.9   SHANNON Level 4 2       Stage of Change: ACTION (Actively working towards change)    Diabetes knowledge and skills assessment:     Patient is knowledgeable in diabetes management concepts related to: Healthy Eating, Monitoring and Problem Solving    Patient needs further education on the following diabetes management concepts: Healthy Eating, Monitoring and Taking Medication    Barriers to Learning Assessment: No Barriers identified    Based on learning assessment above, most appropriate setting for further diabetes education would be: Individual setting.    INTERVENTION:    Education provided today on:  AADE Self-Care Behaviors:  Healthy Eating: portion control  Monitoring: review of CGM report-  Frequency of scanning to capture all data (minimum q 8 hours)  Taking Medication: discussed insulin dosing- may benefit from increase in Lantus    Patient was able to demonstrate ability to insert and start sensor without difficulty.  Needed some reminders on how to load the sensor. May need some assistance with removing the packaging when doing the sensor change at home. Encouraged pt to ask wife for assistance as needed.    Opportunities for ongoing education and support in diabetes-self management were discussed.    Pt verbalized understanding of concepts discussed and recommendations provided today.       Education Materials Provided:  Copy of CGM report    PLAN:  Recommend increase Lantus insulin from 22 units to --> 26 units.  Continue to work on portion control.  Try to scan sensor minimum of q 8 hrs- ideally before and 2 hrs after meals, bedtime, and if symptomatic of high/low BG.    AVS printed and provided to patient today.    FOLLOW-UP:  Follow-up appointment scheduled 5/23/18 at 10:30.  Chart routed to referring provider.    Ongoing plan for education and support: Written resources (magazines, books, etc.), Follow-up visit with diabetes educator  and Follow-up with primary care provider    Lucía Chávez RD, CDE  Diabetes     Time Spent: 30 minutes  Encounter Type: Individual    Any diabetes medication dose changes were made via the CDE Protocol and Collaborative Practice Agreement with the patient's endocrinology provider. A copy of this encounter was shared with the provider.

## 2018-04-18 NOTE — PATIENT INSTRUCTIONS
My Diabetes Care Goals:    Increase Lantus to 26 units.     Keep up the good work!    Lucía Chávez RD, LD, CDE  Diabetes      Bring blood glucose meter and logbook with you to all doctor and follow-up appointments.     [facility] for the [facility] Area:  For Your Diabetes Education and Nutrition Appointments Call:  [phone]   For Diabetes Education or Nutrition Related Questions:   Phone: [phone]  E-mail: [email or web address]  Fax: [phone]   If you need a medication refill please contact your pharmacy. Please allow 3 business days for your refills to be completed.    Instructions for emailing the Diabetes Educators    If you need to communicate a non-urgent message to a Diabetes Educator via email, please send to [email or web address].    Please follow the following email guidelines:    Subject line: Secure: your clinic name (example: Secure: [facility])  In the email please include: First name, middle initial, last name and date of birth.    We will be in touch with you within one (1) business day.

## 2018-04-18 NOTE — MR AVS SNAPSHOT
After Visit Summary   4/18/2018    Steve Morgan    MRN: [id number]           Patient Information     Date Of Birth          1960        Visit Information        Provider Department      4/18/2018 10:30 AM Lucía Chávez [facility]        Care Instructions    My Diabetes Care Goals:    Increase Lantus to 26 units.     Keep up the good work!    Lucía Chávez RD, LD, CDE  Diabetes      Bring blood glucose meter and logbook with you to all doctor and follow-up appointments.     [facility] for the [facility]:  For Your Diabetes Education and Nutrition Appointments Call:  [phone]   For Diabetes Education or Nutrition Related Questions:   Phone: [phone]  E-mail: [email or web address]  Fax: [phone]   If you need a medication refill please contact your pharmacy. Please allow 3 business days for your refills to be completed.    Instructions for emailing the Diabetes Educators    If you need to communicate a non-urgent message to a Diabetes Educator via email, please send to [email or web address].    Please follow the following email guidelines:    Subject line: Secure: your clinic name (example: Secure: Mike)  In the email please include: First name, middle initial, last name and date of birth.    We will be in touch with you within one (1) business day.             Follow-ups after your visit        Your next 10 appointments already scheduled     May 08, 2018 11:30 AM CDT   Return Visit with BRIDGET Payne CNP   [facility] ([facility]    [address]  [facility] [id number] [phone]            May 23, 2018 10:30 AM CDT   Diabetic Education with Lucía Chávez   [facility] ([facility])    [address]  [facility] [id number] [phone]              Who to contact      If you have questions or need follow up information about today's clinic visit or your schedule please contact [facility] directly at [phone].  Normal or non-critical lab and imaging results will be communicated to you by MyChart, letter or phone within 4 business days after the clinic has received the results. If you do not hear from us within 7 days, please contact the clinic through [employer] or phone. If you have a critical or abnormal lab result, we will notify you by phone as soon as possible.  Submit refill requests through [employer] or call your pharmacy and they will forward the refill request to us. Please allow 3 business days for your refill to be completed.          Additional Information About Your Visit        [employer] Information     [employer] gives you secure access to your electronic health record. If you see a primary care provider, you can also send messages to your care team and make appointments. If you have questions, please call your primary care clinic.  If you do not have a primary care provider, please call [phone] and they will assist you.        Care EveryWhere ID     This is your Care EveryWhere ID. This could be used by other organizations to access your [place] medical records  [id number]         Blood Pressure from Last 3 Encounters:   02/06/18 136/78   01/31/18 132/80   01/08/18 138/84    Weight from Last 3 Encounters:   03/13/18 110 kg (242 lb 8.1 oz)   02/06/18 109.7 kg (241 lb 12.8 oz)   01/31/18 110.2 kg (243 lb)              Today, you had the following     No orders found for display       Primary Care Provider Office Phone # Fax #    Lisa Pierre PA-C [phone] [address]  [facility] [address]        Equal Access to Services     KE ALMONTE : Ros Alexander, wajovan rodriguez, qaybta kaalgood baum, jayson schwatrz. So [facility] [phone].    ATENCIÓN: Saji randolph  español, tiene a adler disposición servicios gratuitos de asistencia lingüística. Jean bates [phone].    We comply with applicable federal civil rights laws and [place] laws. We do not discriminate on the basis of race, color, national origin, age, disability, sex, sexual orientation, or gender identity.            Thank you!     Thank you for choosing [facility]  for your care. Our goal is always to provide you with excellent care. Hearing back from our patients is one way we can continue to improve our services. Please take a few minutes to complete the written survey that you may receive in the mail after your visit with us. Thank you!             Your Updated Medication List - Protect others around you: Learn how to safely use, store and throw away your medicines at www.disposemymeds.org.          This list is accurate as of 4/18/18 11:27 AM.  Always use your most recent med list.                   Brand Name Dispense Instructions for use Diagnosis    ammonium lactate 12 % cream    LAC-HYDRIN    385 g    Apply topically 2 times daily as needed for dry skin    Diabetic polyneuropathy associated with type 2 diabetes mellitus (H), Pre-ulcerative calluses, Pes planus of both feet       aspirin 81 MG tablet     100    ONE DAILY        atenolol 25 MG tablet    TENORMIN    90 tablet    Take 1 tablet (25 mg) by mouth every morning    Essential hypertension with goal blood pressure less than 140/90       * blood glucose monitoring lancets     1 Box    Use to test blood sugars 2 times daily or as directed.    Type 2 diabetes, HbA1C goal < 8% (H)       * blood glucose monitoring lancets     100 each    Use to test blood sugar 3 times daily or as directed.    Type 2 diabetes mellitus with diabetic neuropathy, unspecified long term insulin use status (H)       * blood glucose monitoring test strip    ONETOUCH VERIO IQ    100 strip    Use to test blood sugars 2 times daily or as directed.     Type 2 diabetes, HbA1C goal < 8% (H)       * blood glucose monitoring test strip    KERLINE CONTOUR NEXT    100 strip    Use to test blood sugar 3 times daily or as directed.    Type 2 diabetes mellitus with diabetic neuropathy, unspecified long term insulin use status (H)       buPROPion 300 MG 24 hr tablet    WELLBUTRIN XL    90 tablet    Take 1 tablet (300 mg) by mouth every morning    Excessive anger       CALCIUM 600 + D PO      Take 1 tablet by mouth daily        citalopram 20 MG tablet    celeXA    90 tablet    Take 1 tablet (20 mg) by mouth daily    Excessive anger       continuous blood glucose monitoring sensor     3 each    For use with Freestyle Nina Flash  for continuous monitioring of blood glucose levels. Replace sensor every 10 days.    Type 2 diabetes mellitus with diabetic neuropathy, with long-term current use of insulin (H)       ferrous sulfate 325 (65 Fe) MG tablet    IRON     Take 1 tablet by mouth daily (with breakfast).        finasteride 5 MG tablet    PROSCAR    90 tablet    Take 1 tablet (5 mg) by mouth daily    Benign prostatic hyperplasia with urinary hesitancy       FREESTYLE NINA READER Georgette     1 Device    1 kit as needed    Type 2 diabetes mellitus with diabetic neuropathy, with long-term current use of insulin (H)       glipiZIDE 10 MG 24 hr tablet    GLUCOTROL XL    180 tablet    Take 2 tablets (20 mg) by mouth every morning    Type 2 diabetes mellitus with diabetic neuropathy, without long-term current use of insulin (H)       hydrocortisone 0.2 % cream    WESTCORT    45 g    Apply topically 2 times daily as needed Apply sparingly to affected area, BID.    Dermatitis       insulin pen needle 31G X 5 MM    B-D U/F    100 each    Use 1 daily or as directed.    Type 2 diabetes mellitus with diabetic neuropathy, unspecified long term insulin use status (H)       * LANTUS SOLOSTAR 100 UNIT/ML injection   Generic drug:  insulin glargine     15 mL    Inject 18 units SQ daily     Type 2 diabetes mellitus with diabetic neuropathy, unspecified long term insulin use status (H)       * insulin glargine 100 UNIT/ML injection    LANTUS SOLOSTAR    45 mL    22 units qd.  Increase as directed to max dose of 45 units qd.    Type 2 diabetes mellitus with diabetic neuropathy, with long-term current use of insulin (H)       LEVOXYL 175 MCG tablet   Generic drug:  levothyroxine     90 tablet    Take 1 tablet (175 mcg) by mouth daily    Hypothyroidism due to acquired atrophy of thyroid       losartan 50 MG tablet    COZAAR    90 tablet    TAKE 1 TABLET DAILY    Essential hypertension with goal blood pressure less than 140/90       metFORMIN 1000 MG tablet    GLUCOPHAGE    180 tablet    Take 1 tablet (1,000 mg) by mouth 2 times daily (with meals)    Type 2 diabetes mellitus with diabetic neuropathy, without long-term current use of insulin (H)       Multi-vitamin Tabs tablet   Generic drug:  multivitamin, therapeutic with minerals     30    1 TABLET DAILY        omeprazole-sodium bicarbonate  MG per capsule    ZEGERID     Take 1 capsule by mouth every morning (before breakfast)        order for DME     1 Units    Equipment being ordered: single cane with rubber foot    Type 2 diabetes, HbA1C goal < 8% (H), Neuropathy in diabetes (H), Unsteady gait       order for DME     1 Units    Equipment being ordered: four pronged cane    Unsteady gait, Neuropathy in diabetes (H), Type 2 diabetes, HbA1C goal < 8% (H)       simvastatin 20 MG tablet    ZOCOR    90 tablet    Take 1 tablet (20 mg) by mouth At Bedtime    Hyperlipidemia LDL goal <100       UNABLE TO FIND      MEDICATION NAME: Prednisolone Acetate, Gatifloxacin and Bromfenac 1/0.5/0.075% - instill one drop in the surgery eye three times daily beginning 2 days before surgery        VITAMIN B 12 PO      Take 250 mcg by mouth daily    Excessive anger       VITAMIN D (CHOLECALCIFEROL) PO      Take 1,000 Units by mouth daily.        * Notice:  This list  has 6 medication(s) that are the same as other medications prescribed for you. Read the directions carefully, and ask your doctor or other care provider to review them with you.

## 2018-05-22 DIAGNOSIS — E11.40 TYPE 2 DIABETES MELLITUS WITH DIABETIC NEUROPATHY, WITHOUT LONG-TERM CURRENT USE OF INSULIN (H): ICD-10-CM

## 2018-05-22 NOTE — LETTER
[facility]  [address] [place], [address]  [phone]        May 24, 2018    Steve Morgan                                                                                                                                     [address] [facility] [address]            We recently received a call from your pharmacy requesting a refill of Metformin.     A review of your chart indicates that an appointment is required with your provider for 3 month diabetic visit. Please call the clinic at [phone] to schedule your appointment.     We have authorized a one month refill of your medication to allow time for you to schedule your appointment.      Taking care of your health is important to us and ongoing visits with your provider are vital to your care.  We look forward to seeing you in the near future.     Sincerely,     [facility]

## 2018-05-24 NOTE — TELEPHONE ENCOUNTER
"Requested Prescriptions   Pending Prescriptions Disp Refills     metFORMIN (GLUCOPHAGE) 1000 MG tablet [Pharmacy Med Name: METFORMIN HCL 1,000 MG TABLET] 180 tablet 1    Last Written Prescription Date:  12/08/2017  Last Fill Quantity: 180 tablet,  # refills: 1   Last office visit: 2/6/2018 with prescribing provider:  01/31/2018   Future Office Visit:     Sig: TAKE 1 TABLET (1,000 MG) BY MOUTH 2 TIMES DAILY (WITH MEALS)    Biguanide Agents Failed    5/22/2018  6:20 PM       Failed - Patient has documented LDL within the past 12 mos.    Recent Labs   Lab Test  05/17/17   1002   LDL  126*            Failed - Patient has documented A1c within the specified period of time.    If HgbA1C is 8 or greater, it needs to be on file within the past 3 months.  If less than 8, must be on file within the past 6 months.     Recent Labs   Lab Test  12/08/17   1016   A1C  9.0*            Passed - Blood pressure less than 140/90 in past 6 months    BP Readings from Last 3 Encounters:   02/06/18 136/78   01/31/18 132/80   01/08/18 138/84                Passed - Patient has had a Microalbumin in the past 12 mos.    Recent Labs   Lab Test  12/08/17   1016   MICROL  17   UMALCR  43.15*            Passed - Patient is age 10 or older       Passed - Patient's CR is NOT>1.4 OR Patient's EGFR is NOT<45 within past 12 mos.    Recent Labs   Lab Test  02/01/18   1104   GFRESTIMATED  79   GFRESTBLACK  >90       Recent Labs   Lab Test  02/01/18   1104   CR  0.97            Passed - Patient does NOT have a diagnosis of CHF.       Passed - Recent (6 mo) or future (30 days) visit within the authorizing provider's specialty    Patient had office visit in the last 6 months or has a visit in the next 30 days with authorizing provider or within the authorizing provider's specialty.  See \"Patient Info\" tab in inbasket, or \"Choose Columns\" in Meds & Orders section of the refill encounter.              Ricardo Rivera XRT  "

## 2018-06-07 ENCOUNTER — MYC MEDICAL ADVICE (OUTPATIENT)
Dept: FAMILY MEDICINE | Facility: CLINIC | Age: 58
End: 2018-06-07

## 2018-06-07 ASSESSMENT — ANXIETY QUESTIONNAIRES
3. WORRYING TOO MUCH ABOUT DIFFERENT THINGS: SEVERAL DAYS
GAD7 TOTAL SCORE: 6
6. BECOMING EASILY ANNOYED OR IRRITABLE: NEARLY EVERY DAY
7. FEELING AFRAID AS IF SOMETHING AWFUL MIGHT HAPPEN: SEVERAL DAYS
5. BEING SO RESTLESS THAT IT IS HARD TO SIT STILL: NOT AT ALL
1. FEELING NERVOUS, ANXIOUS, OR ON EDGE: NOT AT ALL
2. NOT BEING ABLE TO STOP OR CONTROL WORRYING: SEVERAL DAYS
4. TROUBLE RELAXING: NOT AT ALL
7. FEELING AFRAID AS IF SOMETHING AWFUL MIGHT HAPPEN: SEVERAL DAYS
GAD7 TOTAL SCORE: 6
GAD7 TOTAL SCORE: 6

## 2018-06-07 ASSESSMENT — PATIENT HEALTH QUESTIONNAIRE - PHQ9
SUM OF ALL RESPONSES TO PHQ QUESTIONS 1-9: 10
10. IF YOU CHECKED OFF ANY PROBLEMS, HOW DIFFICULT HAVE THESE PROBLEMS MADE IT FOR YOU TO DO YOUR WORK, TAKE CARE OF THINGS AT HOME, OR GET ALONG WITH OTHER PEOPLE: VERY DIFFICULT
SUM OF ALL RESPONSES TO PHQ QUESTIONS 1-9: 10

## 2018-06-08 ASSESSMENT — PATIENT HEALTH QUESTIONNAIRE - PHQ9: SUM OF ALL RESPONSES TO PHQ QUESTIONS 1-9: 10

## 2018-06-08 ASSESSMENT — ANXIETY QUESTIONNAIRES: GAD7 TOTAL SCORE: 6

## 2018-06-14 DIAGNOSIS — E03.4 HYPOTHYROIDISM DUE TO ACQUIRED ATROPHY OF THYROID: ICD-10-CM

## 2018-06-14 RX ORDER — LEVOTHYROXINE SODIUM 175 UG/1
TABLET ORAL
Qty: 30 TABLET | Refills: 0 | Status: SHIPPED | OUTPATIENT
Start: 2018-06-14 | End: 2018-06-30

## 2018-06-14 NOTE — TELEPHONE ENCOUNTER
I refilled the levoxyl for 30 days but no refills.  Please call and let him know we need to recheck his thyroid levels because we previously changed his dose.  He can make a lab only appointment.  Once I confirm that his dose is ok, I'll put refills on the Levoxyl Rx.  Diana Butler NP  Endocrinology

## 2018-06-14 NOTE — TELEPHONE ENCOUNTER
Diana-came for visit 2/6/18 but did not do his labs on the way out.  Please advise.  Daisy Bradley RN      Me        12/13/17 2:13 PM   Note      Patient calling back.  Confused on why dose decreasing if level is high.  Discussed thyroid being opposite and if high is low and if low is high.  No further questions after discussing that.  Will pick-up new dose and recheck in 3 months.  Daisy Bradley RN                     12/13/17 11:35 AM   Rani Heck CMA routed this conversation to [facility]             12/13/17 11:34 AM      Rani Heck CMA contacted Rani Abreu CMA        12/13/17 11:34 AM   Note      Notes Recorded by Diana Butler APRN CNP on 12/13/2017 at 9:37 AM  Please call -  Caio,  Your thyroid levels are a little high.  I am decreasing your levothyroxine dose to 175 mcg/day.  We'll recheck again at your next appointment in 3 months.  Diana Butler NP  Endocrinology        LMOM for patient to call back to the [facility] station.  Letter mailed to patient as well for his records.  Rani Heck M.A.

## 2018-06-15 NOTE — TELEPHONE ENCOUNTER
Tried calling patient on his cell phone x 2.  Call won't connect.  LMOM at home number for patient to call back to the [facility] station.  Rani Heck M.A.

## 2018-06-22 ENCOUNTER — TELEPHONE (OUTPATIENT)
Dept: ENDOCRINOLOGY | Facility: CLINIC | Age: 58
End: 2018-06-22

## 2018-06-22 DIAGNOSIS — E11.40 TYPE 2 DIABETES MELLITUS WITH DIABETIC NEUROPATHY (H): Primary | ICD-10-CM

## 2018-06-25 NOTE — TELEPHONE ENCOUNTER
Pt calls back, wants a1c order added, has apt with LS August, added  Lucía Bond RN, BSN  Message handled by Nurse Triage.

## 2018-06-28 DIAGNOSIS — E03.4 HYPOTHYROIDISM DUE TO ACQUIRED ATROPHY OF THYROID: ICD-10-CM

## 2018-06-28 DIAGNOSIS — E11.40 TYPE 2 DIABETES MELLITUS WITH DIABETIC NEUROPATHY (H): ICD-10-CM

## 2018-06-28 LAB
HBA1C MFR BLD: 9 % (ref 0–5.6)
T4 FREE SERPL-MCNC: 1.55 NG/DL (ref 0.76–1.46)
TSH SERPL DL<=0.005 MIU/L-ACNC: 0.02 MU/L (ref 0.4–4)

## 2018-06-28 PROCEDURE — 36415 COLL VENOUS BLD VENIPUNCTURE: CPT | Performed by: CLINICAL NURSE SPECIALIST

## 2018-06-28 PROCEDURE — 84439 ASSAY OF FREE THYROXINE: CPT | Performed by: CLINICAL NURSE SPECIALIST

## 2018-06-28 PROCEDURE — 83036 HEMOGLOBIN GLYCOSYLATED A1C: CPT | Performed by: CLINICAL NURSE SPECIALIST

## 2018-06-28 PROCEDURE — 84443 ASSAY THYROID STIM HORMONE: CPT | Performed by: CLINICAL NURSE SPECIALIST

## 2018-06-30 RX ORDER — LEVOTHYROXINE SODIUM 150 UG/1
150 TABLET ORAL DAILY
Qty: 90 TABLET | Refills: 0 | Status: SHIPPED | OUTPATIENT
Start: 2018-06-30 | End: 2018-09-13 | Stop reason: DRUGHIGH

## 2018-06-30 NOTE — PROGRESS NOTES
Caio,  Your thyroid levels are better but still a little too high.  I am decreasing your levothyroxine to 150 mcg/day.  We'll recheck levels again in 4-6 weeks.  I'll send a new prescription to your pharmacy.  Diana Butler NP  Endocrinology

## 2018-07-15 DIAGNOSIS — E03.4 HYPOTHYROIDISM DUE TO ACQUIRED ATROPHY OF THYROID: ICD-10-CM

## 2018-07-16 RX ORDER — LEVOTHYROXINE SODIUM 175 UG/1
TABLET ORAL
Status: SHIPPED
Start: 2018-07-16 | End: 2018-09-07

## 2018-07-16 NOTE — TELEPHONE ENCOUNTER
Is this on auto refill??  Dose was changed to Levoxyl 150 mcg 6/30/18 and 3 month RX sent of 150 mcg dose.  Please clarify.-faxed to pharmacy.    Is due for diabetic visit-5/6/18.  Has appt 9/7/18 for diabetes.  Added to appt note to check thyroid labs for refills.  Has enough of current dose Levoxyl 150 mcg til visit and recheck labs at visit.   Daisy Bradley RN    Entered by Diana Butler APRN CNP at 6/30/2018  4:11 PM   Read by Steve Morgan at 7/9/2018 12:07 PM   Caio,   Your thyroid levels are better but still a little too high.   I am decreasing your levothyroxine to 150 mcg/day.   We'll recheck levels again in 4-6 weeks.   I'll send a new prescription to your pharmacy.   Diana Butler NP   Endocrinology

## 2018-07-16 NOTE — TELEPHONE ENCOUNTER
"Requested Prescriptions   Pending Prescriptions Disp Refills     LEVOXYL 175 MCG tablet [Pharmacy Med Name: LEVOXYL 175 MCG TABLET]  Last Written Prescription Date:  6/30/2018  Last Fill Quantity: 90 tablet,  # refills: 0   Last office visit: 2/6/2018 with prescribing provider:  Sacha     Future Office Visit:   Next 5 appointments (look out 90 days)     Sep 07, 2018  9:30 AM CDT   Return Visit with BRIDGET Payne CNP   [facility] ([facility]    [address] [facility] [address]   [phone]                  30 tablet 0     Sig: TAKE 1 TABLET (175 MCG) BY MOUTH DAILY    Thyroid Protocol Failed    7/15/2018  1:58 AM       Failed - Normal TSH on file in past 12 months    Recent Labs   Lab Test  06/28/18   0845   TSH  0.02*             Passed - Patient is 12 years or older       Passed - Recent (12 mo) or future (30 days) visit within the authorizing provider's specialty    Patient had office visit in the last 12 months or has a visit in the next 30 days with authorizing provider or within the authorizing provider's specialty.  See \"Patient Info\" tab in inbasket, or \"Choose Columns\" in Meds & Orders section of the refill encounter.              "

## 2018-08-06 ENCOUNTER — TELEPHONE (OUTPATIENT)
Dept: ENDOCRINOLOGY | Facility: CLINIC | Age: 58
End: 2018-08-06

## 2018-08-06 NOTE — TELEPHONE ENCOUNTER
Panel Management Review      Patient has the following on his problem list:     Depression / Dysthymia review    Measure:  Needs PHQ-9 score of 4 or less during index window.  Administer PHQ-9 and if score is 5 or more, send encounter to provider for next steps.        PHQ-9 SCORE 12/12/2017 1/31/2018 6/7/2018   Total Score - - -   Total Score MyChart 8 (Mild depression) - 10 (Moderate depression)   Total Score 8 4 10       If PHQ-9 recheck is 5 or more, route to provider for next steps.    Patient is due for:  None    Diabetes    ASA: Passed    Last A1C  Lab Results   Component Value Date    A1C 9.0 06/28/2018    A1C 9.0 12/08/2017    A1C 8.5 09/07/2017    A1C 10.0 05/17/2017    A1C 7.7 12/22/2016     A1C tested: FAILED    Last LDL:    Lab Results   Component Value Date    CHOL 192 05/17/2017     Lab Results   Component Value Date    HDL 30 05/17/2017     Lab Results   Component Value Date     05/17/2017     Lab Results   Component Value Date    TRIG 179 05/17/2017     Lab Results   Component Value Date    CHOLHDLRATIO 4.6 05/21/2015     Lab Results   Component Value Date    NHDL 162 05/17/2017       Is the patient on a Statin? YES             Is the patient on Aspirin? YES    Medications     HMG CoA Reductase Inhibitors    simvastatin (ZOCOR) 20 MG tablet    Salicylates    ASPIRIN 81 MG OR TABS          Last three blood pressure readings:  BP Readings from Last 3 Encounters:   02/06/18 136/78   01/31/18 132/80   01/08/18 138/84       Date of last diabetes office visit: 2/06/2018     Tobacco History:     History   Smoking Status     Never Smoker   Smokeless Tobacco     Never Used           Composite cancer screening  Chart review shows that this patient is due/due soon for the following None  Summary:    Patient is due/failing the following:   A1C and LDL    Action needed:   Patient needs office visit for Diabetes.    Type of outreach:    Phone, spoke to patient.  Patient is scheduled for 9/07/18, advised  to come fasting as he is due for LDL and other blood work.  patient states he is having trouble keeping his Nina CGM sensor on.  Requests an appt with Lucía Tanner in Diabetes Ed.  Appointment scheduled for this Friday, August 10th.    Questions for provider review:    None                                                                                                                                    Rani Heck M.A.

## 2018-08-15 ENCOUNTER — TRANSFERRED RECORDS (OUTPATIENT)
Dept: HEALTH INFORMATION MANAGEMENT | Facility: CLINIC | Age: 58
End: 2018-08-15

## 2018-09-07 ENCOUNTER — ALLIED HEALTH/NURSE VISIT (OUTPATIENT)
Dept: EDUCATION SERVICES | Facility: CLINIC | Age: 58
End: 2018-09-07
Payer: COMMERCIAL

## 2018-09-07 ENCOUNTER — OFFICE VISIT (OUTPATIENT)
Dept: ENDOCRINOLOGY | Facility: CLINIC | Age: 58
End: 2018-09-07
Payer: COMMERCIAL

## 2018-09-07 VITALS
DIASTOLIC BLOOD PRESSURE: 80 MMHG | SYSTOLIC BLOOD PRESSURE: 126 MMHG | BODY MASS INDEX: 39.11 KG/M2 | HEART RATE: 84 BPM | WEIGHT: 235 LBS

## 2018-09-07 DIAGNOSIS — I10 ESSENTIAL HYPERTENSION WITH GOAL BLOOD PRESSURE LESS THAN 140/90: ICD-10-CM

## 2018-09-07 DIAGNOSIS — R45.4 EXCESSIVE ANGER: ICD-10-CM

## 2018-09-07 DIAGNOSIS — E03.4 HYPOTHYROIDISM DUE TO ACQUIRED ATROPHY OF THYROID: ICD-10-CM

## 2018-09-07 DIAGNOSIS — E11.40 TYPE 2 DIABETES MELLITUS WITH DIABETIC NEUROPATHY, WITHOUT LONG-TERM CURRENT USE OF INSULIN (H): ICD-10-CM

## 2018-09-07 DIAGNOSIS — Z79.4 TYPE 2 DIABETES MELLITUS WITH DIABETIC NEUROPATHY, WITH LONG-TERM CURRENT USE OF INSULIN (H): Primary | ICD-10-CM

## 2018-09-07 DIAGNOSIS — L30.9 DERMATITIS: ICD-10-CM

## 2018-09-07 DIAGNOSIS — E11.40 TYPE 2 DIABETES MELLITUS WITH DIABETIC NEUROPATHY, WITH LONG-TERM CURRENT USE OF INSULIN (H): Primary | ICD-10-CM

## 2018-09-07 DIAGNOSIS — Z23 NEED FOR PROPHYLACTIC VACCINATION AND INOCULATION AGAINST INFLUENZA: ICD-10-CM

## 2018-09-07 PROCEDURE — 84439 ASSAY OF FREE THYROXINE: CPT | Performed by: CLINICAL NURSE SPECIALIST

## 2018-09-07 PROCEDURE — 90682 RIV4 VACC RECOMBINANT DNA IM: CPT | Performed by: CLINICAL NURSE SPECIALIST

## 2018-09-07 PROCEDURE — 84443 ASSAY THYROID STIM HORMONE: CPT | Performed by: CLINICAL NURSE SPECIALIST

## 2018-09-07 PROCEDURE — G0008 ADMIN INFLUENZA VIRUS VAC: HCPCS | Performed by: CLINICAL NURSE SPECIALIST

## 2018-09-07 PROCEDURE — 99214 OFFICE O/P EST MOD 30 MIN: CPT | Mod: 25 | Performed by: CLINICAL NURSE SPECIALIST

## 2018-09-07 PROCEDURE — 36415 COLL VENOUS BLD VENIPUNCTURE: CPT | Performed by: CLINICAL NURSE SPECIALIST

## 2018-09-07 RX ORDER — HYDROCORTISONE VALERATE CREAM 2 MG/G
CREAM TOPICAL 2 TIMES DAILY PRN
Qty: 45 G | Refills: 1 | Status: ON HOLD | OUTPATIENT
Start: 2018-09-07 | End: 2022-12-23

## 2018-09-07 NOTE — MR AVS SNAPSHOT
After Visit Summary   9/7/2018    Steve Morgan    MRN: [id number]           Patient Information     Date Of Birth          1960        Visit Information        Provider Department      9/7/2018 11:00 AM Lucía Chávez RD [facility]        Today's Diagnoses     Type 2 diabetes mellitus with diabetic neuropathy, with long-term current use of insulin (H)    -  1       Follow-ups after your visit        Your next 10 appointments already scheduled     Sep 26, 2018 10:30 AM CDT   Diabetes Education with Lucía Chávez RD   [facility] ([facility])    [id number] [place] [id number]   [phone]            Oct 09, 2018  9:00 AM CDT   LAB with CR LAB   [facility] [place] 2[place]    [address] [place] [id number]   [phone]           Please do not eat 10-12 hours before your appointment if you are coming in fasting for labs on lipids, cholesterol, or glucose (sugar). This does not apply to pregnant women. Water, hot tea and black coffee (with nothing added) are okay. Do not drink other fluids, diet soda or chew gum.            Oct 16, 2018  9:20 AM CDT   Travel Education with London Escalera PA-C   [facility] ([facility])    [address] [place] [phone]   [phone]           Bring Medication List Bring International Certificate of Vaccination (Yellow Card) or Vaccination Records Adults need to check date of last TD. all insurance conmpany regarding benefits for travel consultation and vaccinations.            Dec 14, 2018  9:30 AM CST   Return Visit with BRIDGET Payne CNP   [facility] ([facility])    [id number] [place] [phone]   [phone]              Who to contact     If you have questions or need follow up  information about today's clinic visit or your schedule please contact [facility] directly at [phone].  Normal or non-critical lab and imaging results will be communicated to you by iCrackedhart, letter or phone within 4 business days after the clinic has received the results. If you do not hear from us within 7 days, please contact the clinic through iCrackedhart or phone. If you have a critical or abnormal lab result, we will notify you by phone as soon as possible.  Submit refill requests through Zaarly or call your pharmacy and they will forward the refill request to us. Please allow 3 business days for your refill to be completed.          Additional Information About Your Visit        iCrackedharGraphenea Information     Zaarly gives you secure access to your electronic health record. If you see a primary care provider, you can also send messages to your care team and make appointments. If you have questions, please call your primary care clinic.  If you do not have a primary care provider, please call [phone] and they will assist you.        Care EveryWhere ID     This is your Care EveryWhere ID. This could be used by other organizations to access your [facility] medical records  [id number]         Blood Pressure from Last 3 Encounters:   09/07/18 126/80   02/06/18 136/78   01/31/18 132/80    Weight from Last 3 Encounters:   09/07/18 106.6 kg (235 lb)   03/13/18 110 kg (242 lb 8.1 oz)   02/06/18 109.7 kg (241 lb 12.8 oz)              Today, you had the following     No orders found for display         Today's Medication Changes          These changes are accurate as of 9/7/18 11:59 AM.  If you have any questions, ask your nurse or doctor.               These medicines have changed or have updated prescriptions.        Dose/Directions    levothyroxine 150 MCG tablet   Commonly known as:  LEVOXYL   This may have changed:  Another medication with the same name was removed. Continue taking this  medication, and follow the directions you see here.   Used for:  Hypothyroidism due to acquired atrophy of thyroid   Changed by:  Diana Butler APRN CNP        Dose:  150 mcg   Take 1 tablet (150 mcg) by mouth daily   Quantity:  90 tablet   Refills:  0            Where to get your medicines      These medications were sent to [facility]/pharmacy #[id number] - [place], [place] - [address] [address].  [address] [address] [facility] [address]     Phone:  [phone]     hydrocortisone 0.2 % cream    metFORMIN 1000 MG tablet                Primary Care Provider Office Phone # Fax #    Lisa YOLANDA Pierre PA-C [phone] [address] [address] [address]        Equal Access to Services     KE ALMONTE : Ros Alexander, wajovan rodriguez, qaybta kaalmaaries baum, jayson schwartz. So [facility] [phone].    ATENCIÓN: Si habla español, tiene a adler disposición servicios gratuitos de asistencia lingüística. [facility] [phone].    We comply with applicable federal civil rights laws and [place] laws. We do not discriminate on the basis of race, color, national origin, age, disability, sex, sexual orientation, or gender identity.            Thank you!     Thank you for choosing [facility]  for your care. Our goal is always to provide you with excellent care. Hearing back from our patients is one way we can continue to improve our services. Please take a few minutes to complete the written survey that you may receive in the mail after your visit with us. Thank you!             Your Updated Medication List - Protect others around you: Learn how to safely use, store and throw away your medicines at www.disposemymeds.org.          This list is accurate as of 9/7/18 11:59 AM.  Always use your most recent med list.                   Brand Name Dispense Instructions for use Diagnosis    ammonium lactate 12 % cream    LAC-HYDRIN    385 g    Apply topically  2 times daily as needed for dry skin    Diabetic polyneuropathy associated with type 2 diabetes mellitus (H), Pre-ulcerative calluses, Pes planus of both feet       aspirin 81 MG tablet     100    ONE DAILY        atenolol 25 MG tablet    TENORMIN    90 tablet    TAKE 1 TABLET (25 MG) BY MOUTH EVERY MORNING    Essential hypertension with goal blood pressure less than 140/90       * blood glucose monitoring lancets     1 Box    Use to test blood sugars 2 times daily or as directed.    Type 2 diabetes, HbA1C goal < 8% (H)       * blood glucose monitoring lancets     100 each    Use to test blood sugar 3 times daily or as directed.    Type 2 diabetes mellitus with diabetic neuropathy, unspecified long term insulin use status (H)       * blood glucose monitoring test strip    ONETOUCH VERIO IQ    100 strip    Use to test blood sugars 2 times daily or as directed.    Type 2 diabetes, HbA1C goal < 8% (H)       * blood glucose monitoring test strip    KERLINE CONTOUR NEXT    100 strip    Use to test blood sugar 3 times daily or as directed.    Type 2 diabetes mellitus with diabetic neuropathy, unspecified long term insulin use status (H)       buPROPion 300 MG 24 hr tablet    WELLBUTRIN XL    90 tablet    TAKE 1 TABLET (300 MG) BY MOUTH EVERY MORNING    Excessive anger       CALCIUM 600 + D PO      Take 1 tablet by mouth daily        citalopram 20 MG tablet    celeXA    90 tablet    TAKE 1 TABLET (20 MG) BY MOUTH DAILY    Excessive anger       continuous blood glucose monitoring sensor     3 each    For use with Freestyle Nina Flash  for continuous monitioring of blood glucose levels. Replace sensor every 10 days.    Type 2 diabetes mellitus with diabetic neuropathy, with long-term current use of insulin (H)       ferrous sulfate 325 (65 Fe) MG tablet    IRON     Take 1 tablet by mouth daily (with breakfast).        finasteride 5 MG tablet    PROSCAR    90 tablet    Take 1 tablet (5 mg) by mouth daily    Benign  prostatic hyperplasia with urinary hesitancy       FREESTYLE GIULIANA READER Georgette     1 Device    1 kit as needed    Type 2 diabetes mellitus with diabetic neuropathy, with long-term current use of insulin (H)       glipiZIDE 10 MG 24 hr tablet    GLUCOTROL XL    180 tablet    Take 2 tablets (20 mg) by mouth every morning    Type 2 diabetes mellitus with diabetic neuropathy, without long-term current use of insulin (H)       hydrocortisone 0.2 % cream    WESTCORT    45 g    Apply topically 2 times daily as needed Apply sparingly to affected area, BID.    Dermatitis       insulin glargine 100 UNIT/ML injection    LANTUS SOLOSTAR    45 mL    26 units qd.  Increase as directed to max dose of 45 units qd.    Type 2 diabetes mellitus with diabetic neuropathy, with long-term current use of insulin (H)       insulin pen needle 31G X 5 MM    B-D U/F    100 each    Use 1 daily or as directed.    Type 2 diabetes mellitus with diabetic neuropathy, unspecified long term insulin use status (H)       levothyroxine 150 MCG tablet    LEVOXYL    90 tablet    Take 1 tablet (150 mcg) by mouth daily    Hypothyroidism due to acquired atrophy of thyroid       losartan 50 MG tablet    COZAAR    90 tablet    TAKE 1 TABLET DAILY    Essential hypertension with goal blood pressure less than 140/90       metFORMIN 1000 MG tablet    GLUCOPHAGE    180 tablet    TAKE 1 TABLET (1,000 MG) BY MOUTH 2 TIMES DAILY (WITH MEALS)    Type 2 diabetes mellitus with diabetic neuropathy, without long-term current use of insulin (H)       Multi-vitamin Tabs tablet   Generic drug:  multivitamin, therapeutic with minerals     30    1 TABLET DAILY        omeprazole-sodium bicarbonate  MG per capsule    ZEGERID     Take 1 capsule by mouth every morning (before breakfast)        order for DME     1 Units    Equipment being ordered: single cane with rubber foot    Type 2 diabetes, HbA1C goal < 8% (H), Neuropathy in diabetes (H), Unsteady gait       order for DME      1 Units    Equipment being ordered: four pronged cane    Unsteady gait, Neuropathy in diabetes (H), Type 2 diabetes, HbA1C goal < 8% (H)       simvastatin 20 MG tablet    ZOCOR    90 tablet    Take 1 tablet (20 mg) by mouth At Bedtime    Hyperlipidemia LDL goal <100       UNABLE TO FIND      MEDICATION NAME: Prednisolone Acetate, Gatifloxacin and Bromfenac 1/0.5/0.075% - instill one drop in the surgery eye three times daily beginning 2 days before surgery        VITAMIN B 12 PO      Take 250 mcg by mouth daily    Excessive anger       VITAMIN D (CHOLECALCIFEROL) PO      Take 1,000 Units by mouth daily.        * Notice:  This list has 4 medication(s) that are the same as other medications prescribed for you. Read the directions carefully, and ask your doctor or other care provider to review them with you.

## 2018-09-07 NOTE — LETTER
"    9/7/2018         RE: Steve Morgan  [address]  [facility] [address]        Dear Colleague,    Thank you for referring your patient, Steve Morgan, to the [facility]. Please see a copy of my visit note below.    Name: Steve \"Caio\"Cathy  Seen at the request of Lisa Pierre for Diabetes (Last seen 2/6/2018).  HPI:  Steve Morgan is a 58 year old male who presents for the evaluation/management of:    1. Type 2 DM:  Orginally diagnosed at the age of 33 or 38.  Was prediabetic prior, was not particularly symptomatic at the time, was noted on routine lab work    Current Regimen: Metformin 1000 mg bid, glipzide 20 mg q am, Lantus 26 units qd (started 5/2017)     BS checks: None - not wearing Nina since April.    Meter Download: brought Nina but no data since April    Elevated blood sugar due to continued dietary indiscretions, states he hasn't been watching his diet as carefully.  Likes to snack between meals, buys snacks at the gas station.      Complications:   Diabetes Complications  Description / Detail    Diabetic Retinopathy  No retinopathy, early cataract, last exam 7/2018.  Cataract surgery yesterday, right eye scheduled 2/19/18   CAD / PAD  No   Neuropathy  Yes: numbness hands and feet   Nephropathy / Microalbuminuria  No   Gastroparesis  No   Hypoglycemia [place]awarness  Not sure, gets 'kind of dizzy' when blood sugar is low but reports history of low blood sugars without symptoms     2. Hypertension: Blood Pressure today:   BP Readings from Last 3 Encounters:   09/07/18 126/80   02/06/18 136/78   01/31/18 132/80   .  Blood pressure medications include atenolol 25 mg qd and losartan 50 mg qd  .  Takes medications everyday without forgetting a dose.  Denies feeling lightheaded or dizzy.   3. Hyperlipidemia: Takes simvastatin 20 mg for lipid control.  Denies muscle aches of pains.       4. Prevention:  Flu Shot- 9/2017  Pneumovax- 2012  Opthalmology-Yes: dilated eye " exam 6/2017, cataract surgery yesterday + 2/19/2018  Dental-Yes: twice a year  ASA-Yes, 81 mg qd   Smoking- No  5.  Hypothyroidism. Currently treated with levoxyl 150 mcg daily.  This dose was decreased 3 months ago due to low TSH and elevated T4. Takes the levothyroxine first thing in the morning and waits 30+ minutes before eating breakfast.    PMH/PSH:  Past Medical History:   Diagnosis Date     Cellulitis and abscess of trunk 6/27/2017     Depression      Hyperlipidemia LDL goal <100 10/31/2010     Hypertension goal BP (blood pressure) < 140/90 3/17/2011     Hypothyroidism 1/12/2010     Morbid obesity due to excess calories (H) 1/12/2010     Neuropathy in diabetes (H) 1/12/2010     Obesity 1/12/2010     Sleep apnea 1/12/2010    CPAP     Type 2 diabetes, HbA1C goal < 8% (H) 3/8/2011     Past Surgical History:   Procedure Laterality Date     COLONOSCOPY       GENITOURINARY SURGERY      surg for undescended testicle     REPAIR HAMMER TOE BILATERAL  5/16/2013    Procedure: REPAIR HAMMER TOE BILATERAL;  Flexor Tenotomy Toes 2,3,4,5 Bilateral Feet;  Surgeon: Saad Bangura DPM;  Location: [facility] OR     Family Hx:  Family History   Problem Relation Age of Onset     Breast Cancer Mother      Hypertension Mother      Thyroid Disease Mother      Depression Mother      Cancer - colorectal Father      Thyroid Disease Father      Depression Father      HEART DISEASE Maternal Grandmother      CHF     Circulatory Paternal Grandmother      Cancer Paternal Grandfather      Diabetes Brother      Diabetes Brother      Thyroid disease: Yes: mother         DM2: Yes: one brother with type 1 diabetes and one brother with type 2 diabetes, paternal aunt with DM2         Autoimmune: DM1, SLE, RA, Vitiligo Yes: brother with DM1    Social Hx:  Social History     Social History     Marital status:      Spouse name: Sri     Number of children: 2     Years of education: N/A     Occupational History      None       [employer]     Social History Main Topics     Smoking status: Never Smoker     Smokeless tobacco: Never Used     Alcohol use Yes      Comment: occ     Drug use: No     Sexual activity: Yes     Partners: Female     Other Topics Concern     Parent/Sibling W/ Cabg, Mi Or Angioplasty Before 65f 55m? No     Social History Narrative          MEDICATIONS:  has a current medication list which includes the following prescription(s): ammonium lactate, aspirin, atenolol, blood glucose monitoring, blood glucose monitoring, blood glucose monitoring, blood glucose monitoring, bupropion, calcium carb-cholecalciferol, citalopram, freestyle juan reader, continuous blood glucose monitoring, cyanocobalamin, ferrous sulfate, finasteride, glipizide, hydrocortisone, insulin glargine, insulin pen needle, levothyroxine, losartan, metformin, multi-vitamin, omeprazole-sodium bicarbonate, order for dme, order for dme, simvastatin, UNABLE TO FIND, and cholecalciferol, and the following Facility-Administered Medications: cefazolin.    ROS     ROS: 10 point ROS neg other than the symptoms noted above in the HPI.    Physical Exam   VS: /80 (BP Location: Left arm, Patient Position: Chair, Cuff Size: Adult Large)  Pulse 84  Wt 106.6 kg (235 lb)  BMI 39.11 kg/m2  GENERAL: AXOX3, NAD, well dressed, answering questions appropriately, appears stated age.  HEENT: no exopthalmous, no proptosis, no lig lag, no retraction  CV: RRR, no rubs, gallops, no murmurs  LUNGS: CTAB, no wheezes, rales, or ronchi  EXTREMITIES: trace edema at the ankles  NEUROLOGY: CN grossly intact, no tremors  MSK: grossly intact  SKIN: no rashes, no lesions    LABS:  A1c:   Component      Latest Ref Rng & Units 9/7/2017 12/8/2017 6/28/2018   Hemoglobin A1C      0 - 5.6 % 8.5 (H) 9.0 (H) 9.0 (H)     Basic Metabolic Panel:  !COMPREHENSIVE Latest Ref Rng & Units 2/1/2018   SODIUM 133 - 144 mmol/L 128 (L)   POTASSIUM 3.4 - 5.3 mmol/L 4.7   CHLORIDE 94 - 109 mmol/L 93 (L)   BUN 7  - 30 mg/dL 13   Creatinine 0.66 - 1.25 mg/dL 0.97   Glucose 70 - 99 mg/dL 274 (H)   ANION GAP 3 - 14 mmol/L 7   CALCIUM 8.5 - 10.1 mg/dL 8.6     LFTS/Cholesterol Panel:  !LIPID/HEPATIC Latest Ref Rng & Units 5/17/2017   CHOLESTEROL <200 mg/dL 192   TRIGLYCERIDES <150 mg/dL 179 (H)   HDL CHOLESTEROL >39 mg/dL 30 (L)   LDL CHOLESTEROL, CALCULATED <100 mg/dL 126 (H)   VLDL-CHOLESTEROL 0 - 30 mg/dL    NON HDL CHOLESTEROL <130 mg/dL 162 (H)   CHOLESTEROL/HDL RATIO 0.0 - 5.0    AST 0 - 45 U/L 22   ALT 0 - 70 U/L 35     Thyroid Function:   !THYROID Latest Ref Rng & Units 6/28/2018 12/8/2017 12/22/2016   TSH 0.40 - 4.00 mU/L 0.02 (L) 0.07 (L) 0.48   T4 FREE 0.76 - 1.46 ng/dL 1.55 (H) 1.59 (H)      Urine MicroAlbumin:  Component    Latest Ref Rng & Units 11/15/2016   Creatinine Urine    mg/dL 17   Albumin Urine mg/L    mg/L <5   Albumin Urine mg/g Cr    0 - 17 mg/g Cr Unable to calc     Vitamin D:    All pertinent notes, labs, and images personally reviewed by me.     A/P  Mr.Walter Morgan is a 57 year old here for the evaluation/management of diabetes:    1. DM2 - Uncontrolled.    Diabetes is complicated by neuropathy  No blood sugar data for review today.  See diabetes ed today to review Nina insertion.  C/o limited use of his hands and increasing difficulty with ADL's - referral to [facility] for hand therapy.  Too soon to repeat A1c today - he'll make a lab appointment to recheck A1c the first week in October.    He has a difficult time testing his blood sugar due to significant hand tremors.    Use Nina consistently.  There is some variability among people, most will usually develop symptoms suggestive of hypoglycemia when blood glucose levels are lowered to the mid 60's. The first set of symptoms are called adrenergic. Patients may experience any of the following nervousness, sweating, intense hunger, trembling, weakness, palpitations, and difficulty speaking.   The acute management of hypoglycemia involves the rapid  delivery of a source of easily absorbed sugar. Regular soda, juice, lifesavers, table sugar, are good options. 15 grams of glucose is the dose that is given, followed by an assessment of symptoms and a blood glucose check if possible. If after 10 minutes there is no improvement, another 10-15 grams should be given. This can be repeated up to three times. The equivalency of 10-15 grams of glucose (approximate servings) are: 3-5 hard candies, 3 teaspoons of sugar, or 1/2 cup of regular soda or juice.      2. Hypothyroidism.  Currently treated with Levoxyl (KAMERON) 175 mcg/day.  Will obtain TFT's today and adjust levoxyl dose if indicated.    3.  Hypertension - Variable.      4. Hyperlipidemia - On statin therapy.    Labs ordered today:   Orders Placed This Encounter   Procedures     FLU VACCINE, (RIV4) RECOMBINANT HA  , IM (FluBlok, egg free) [43184]- >18 YRS (FMG recommended  50-64 YRS)     Vaccine Administration, Initial [45191]     TSH     T4 FREE     PHYSICAL THERAPY REFERRAL     [facility] PT, HAND, AND CHIROPRACTIC REFERRAL       Radiology/Consults ordered today: C RIV4 (FLUBLOK) VACCINE RECOMBINANT DNA PRSRV ANTIBIO FREE, IM  HC VACCINE ADMINISTRATION, INITIAL  PHYSICAL THERAPY REFERRAL  [facility], HAND, AND CHIROPRACTIC REFERRAL    All questions were answered.  The patient indicates understanding of the above issues and agrees with the plan set forth.  Total face to face time greater than or equal to 25 minutes.       Follow-up:  See diabetes ed today to review Nina insertion  3 months with mahendra Butler NP  Endocrinology  [facility]  CC: Lisa Pierre      Injectable Influenza Immunization Documentation    1.  Is the person to be vaccinated sick today?   No    2. Does the person to be vaccinated have an allergy to a component   of the vaccine?   No  Egg Allergy Algorithm Link    3. Has the person to be vaccinated ever had a serious reaction   to influenza vaccine in the past?   No    4. Has the  person to be vaccinated ever had Guillain-Barré syndrome?   No    Form completed by Rani Heck M.A.           Again, thank you for allowing me to participate in the care of your patient.        Sincerely,        BRIDGET Payne CNP

## 2018-09-07 NOTE — PROGRESS NOTES

## 2018-09-07 NOTE — PATIENT INSTRUCTIONS
Make a lab appointment for the A1c the first week in October.    Follow up in 3 months for diabetes.    Diana Butler NP  Endocrinology

## 2018-09-07 NOTE — MR AVS SNAPSHOT
After Visit Summary   9/7/2018    Steve Morgan    MRN: [id number]           Patient Information     Date Of Birth          1960        Visit Information        Provider Department      9/7/2018 9:30 AM Diana Butler APRN [facility]        Today's Diagnoses     Type 2 diabetes mellitus with diabetic neuropathy, with long-term current use of insulin (H)    -  1    Hypothyroidism due to acquired atrophy of thyroid        Need for prophylactic vaccination and inoculation against influenza        Type 2 diabetes mellitus with diabetic neuropathy, without long-term current use of insulin (H)        Dermatitis          Care Instructions        Make a lab appointment for the A1c the first week in October.              Follow-ups after your visit        Additional Services     PHYSICAL THERAPY REFERRAL       *This therapy referral will be filtered to a centralized scheduling office at [facility] and the patient will receive a call to schedule an appointment at a [facility] location most convenient for them. *     [facility] provides Physical Therapy evaluation and treatment and many specialty services across the [facility] system.  If requesting a specialty program, please choose from the list below.    If you have not heard from the scheduling office within 2 business days, please call [phone] for all locations, with the exception of [place], please call [phone] and [facility], please call [phone]  Treatment: Evaluation & Treatment  Special Instructions/Modalities: Evaluate and treat hand range of motion/use - having difficulty due to diabetic neuropathy  Special Programs: None    Please be aware that coverage of these services is subject to the terms and limitations of your health insurance plan.  Call member services at your health plan with any benefit or coverage questions.      **Note to Provider:  If you are  "referring outside of [facility] for the therapy appointment, please list the name of the location in the \"special instructions\" above, print the referral and give to the patient to schedule the appointment.                  Your next 10 appointments already scheduled     Dec 14, 2018  9:30 AM CST   [facility]GET [facility]    [address]  [facility] [address] [phone]              Who to contact     If you have questions or need follow up information about today's clinic visit or your schedule please contact [facility] directly at [phone].  Normal or non-critical lab and imaging results will be communicated to you by MyChart, letter or phone within 4 business days after the clinic has received the results. If you do not hear from us within 7 days, please contact the clinic through Avatriphart or phone. If you have a critical or abnormal lab result, we will notify you by phone as soon as possible.  Submit refill requests through Bevvy or call your pharmacy and they will forward the refill request to us. Please allow 3 business days for your refill to be completed.          Additional Information About Your Visit        MyChart Information     Bevvy gives you secure access to your electronic health record. If you see a primary care provider, you can also send messages to your care team and make appointments. If you have questions, please call your primary care clinic.  If you do not have a primary care provider, please call [phone] and they will assist you.        Care EveryWhere ID     This is your Care EveryWhere ID. This could be used by other organizations to access your [facility] medical records  [id number]        Your Vitals Were     Pulse BMI (Body Mass Index)                84 39.11 kg/m2           Blood Pressure from Last 3 Encounters:   09/07/18 126/80   02/06/18 136/78 "   01/31/18 132/80    Weight from Last 3 Encounters:   09/07/18 106.6 kg (235 lb)   03/13/18 110 kg (242 lb 8.1 oz)   02/06/18 109.7 kg (241 lb 12.8 oz)              We Performed the Following     FLU VACCINE, (RIV4) RECOMBINANT HA  , IM (FluBlok, egg free) [23547]- >18 YRS (FMG recommended  50-64 YRS)     PHYSICAL THERAPY REFERRAL     T4 FREE     TSH     Vaccine Administration, Initial [77920]          Today's Medication Changes          These changes are accurate as of 9/7/18 10:20 AM.  If you have any questions, ask your nurse or doctor.               These medicines have changed or have updated prescriptions.        Dose/Directions    levothyroxine 150 MCG tablet   Commonly known as:  LEVOXYL   This may have changed:  Another medication with the same name was removed. Continue taking this medication, and follow the directions you see here.   Used for:  Hypothyroidism due to acquired atrophy of thyroid   Changed by:  Diana Butler APRN CNP        Dose:  150 mcg   Take 1 tablet (150 mcg) by mouth daily   Quantity:  90 tablet   Refills:  0            Where to get your medicines      These medications were sent to [facility]/pharmacy #[id number] - [place], [place] - [address]  [address] [facility] [address]     Phone:  [phone]     hydrocortisone 0.2 % cream    metFORMIN 1000 MG tablet                Primary Care Provider Office Phone # Fax #    Lisa YOLANDA Pierre PA-C [phone] [address] [address] [address]        Equal Access to Services     KE ALMONTE : Hadii aad ku hadasho Somayeali, waaxda luqadaha, qaybta kaalmada adeegyada, jayson schwartz. So [facility] [phone].    ATENCIÓN: Si johnla val, tiene a adler disposición servicios gratuitos de asistencia lingüística. Llame al [phone].    We comply with applicable federal civil rights laws and [place] laws. We do not discriminate on the basis of race, color, national origin, age, disability, sex,  sexual orientation, or gender identity.            Thank you!     Thank you for choosing [facility]  for your care. Our goal is always to provide you with excellent care. Hearing back from our patients is one way we can continue to improve our services. Please take a few minutes to complete the written survey that you may receive in the mail after your visit with us. Thank you!             Your Updated Medication List - Protect others around you: Learn how to safely use, store and throw away your medicines at www.disposemymeds.org.          This list is accurate as of 9/7/18 10:20 AM.  Always use your most recent med list.                   Brand Name Dispense Instructions for use Diagnosis    ammonium lactate 12 % cream    LAC-HYDRIN    385 g    Apply topically 2 times daily as needed for dry skin    Diabetic polyneuropathy associated with type 2 diabetes mellitus (H), Pre-ulcerative calluses, Pes planus of both feet       aspirin 81 MG tablet     100    ONE DAILY        atenolol 25 MG tablet    TENORMIN    90 tablet    TAKE 1 TABLET (25 MG) BY MOUTH EVERY MORNING    Essential hypertension with goal blood pressure less than 140/90       * blood glucose monitoring lancets     1 Box    Use to test blood sugars 2 times daily or as directed.    Type 2 diabetes, HbA1C goal < 8% (H)       * blood glucose monitoring lancets     100 each    Use to test blood sugar 3 times daily or as directed.    Type 2 diabetes mellitus with diabetic neuropathy, unspecified long term insulin use status (H)       * blood glucose monitoring test strip    ONETOUCH VERIO IQ    100 strip    Use to test blood sugars 2 times daily or as directed.    Type 2 diabetes, HbA1C goal < 8% (H)       * blood glucose monitoring test strip    KERLINE CONTOUR NEXT    100 strip    Use to test blood sugar 3 times daily or as directed.    Type 2 diabetes mellitus with diabetic neuropathy, unspecified long term insulin use status (H)        buPROPion 300 MG 24 hr tablet    WELLBUTRIN XL    90 tablet    TAKE 1 TABLET (300 MG) BY MOUTH EVERY MORNING    Excessive anger       CALCIUM 600 + D PO      Take 1 tablet by mouth daily        citalopram 20 MG tablet    celeXA    90 tablet    TAKE 1 TABLET (20 MG) BY MOUTH DAILY    Excessive anger       continuous blood glucose monitoring sensor     3 each    For use with Freestyle Nina Flash  for continuous monitioring of blood glucose levels. Replace sensor every 10 days.    Type 2 diabetes mellitus with diabetic neuropathy, with long-term current use of insulin (H)       ferrous sulfate 325 (65 Fe) MG tablet    IRON     Take 1 tablet by mouth daily (with breakfast).        finasteride 5 MG tablet    PROSCAR    90 tablet    Take 1 tablet (5 mg) by mouth daily    Benign prostatic hyperplasia with urinary hesitancy       FREESTYLE NINA READER Georgette     1 Device    1 kit as needed    Type 2 diabetes mellitus with diabetic neuropathy, with long-term current use of insulin (H)       glipiZIDE 10 MG 24 hr tablet    GLUCOTROL XL    180 tablet    Take 2 tablets (20 mg) by mouth every morning    Type 2 diabetes mellitus with diabetic neuropathy, without long-term current use of insulin (H)       hydrocortisone 0.2 % cream    WESTCORT    45 g    Apply topically 2 times daily as needed Apply sparingly to affected area, BID.    Dermatitis       insulin glargine 100 UNIT/ML injection    LANTUS SOLOSTAR    45 mL    26 units qd.  Increase as directed to max dose of 45 units qd.    Type 2 diabetes mellitus with diabetic neuropathy, with long-term current use of insulin (H)       insulin pen needle 31G X 5 MM    B-D U/F    100 each    Use 1 daily or as directed.    Type 2 diabetes mellitus with diabetic neuropathy, unspecified long term insulin use status (H)       levothyroxine 150 MCG tablet    LEVOXYL    90 tablet    Take 1 tablet (150 mcg) by mouth daily    Hypothyroidism due to acquired atrophy of thyroid        losartan 50 MG tablet    COZAAR    90 tablet    TAKE 1 TABLET DAILY    Essential hypertension with goal blood pressure less than 140/90       metFORMIN 1000 MG tablet    GLUCOPHAGE    180 tablet    TAKE 1 TABLET (1,000 MG) BY MOUTH 2 TIMES DAILY (WITH MEALS)    Type 2 diabetes mellitus with diabetic neuropathy, without long-term current use of insulin (H)       Multi-vitamin Tabs tablet   Generic drug:  multivitamin, therapeutic with minerals     30    1 TABLET DAILY        omeprazole-sodium bicarbonate  MG per capsule    ZEGERID     Take 1 capsule by mouth every morning (before breakfast)        order for DME     1 Units    Equipment being ordered: single cane with rubber foot    Type 2 diabetes, HbA1C goal < 8% (H), Neuropathy in diabetes (H), Unsteady gait       order for DME     1 Units    Equipment being ordered: four pronged cane    Unsteady gait, Neuropathy in diabetes (H), Type 2 diabetes, HbA1C goal < 8% (H)       simvastatin 20 MG tablet    ZOCOR    90 tablet    Take 1 tablet (20 mg) by mouth At Bedtime    Hyperlipidemia LDL goal <100       UNABLE TO FIND      MEDICATION NAME: Prednisolone Acetate, Gatifloxacin and Bromfenac 1/0.5/0.075% - instill one drop in the surgery eye three times daily beginning 2 days before surgery        VITAMIN B 12 PO      Take 250 mcg by mouth daily    Excessive anger       VITAMIN D (CHOLECALCIFEROL) PO      Take 1,000 Units by mouth daily.        * Notice:  This list has 4 medication(s) that are the same as other medications prescribed for you. Read the directions carefully, and ask your doctor or other care provider to review them with you.

## 2018-09-07 NOTE — TELEPHONE ENCOUNTER
"Requested Prescriptions   Pending Prescriptions Disp Refills     citalopram (CELEXA) 20 MG tablet [Pharmacy Med Name: CITALOPRAM HBR 20 MG TABLET]    Last Written Prescription Date:  6/8/18  Last Fill Quantity: 90 tablet,  # refills: 0   Last office visit: 1/31/2018 with prescribing provider:  Malcolm Wild Office Visit:   Next 5 appointments (look out 90 days)     Sep 07, 2018  9:30 AM CDT   [facility]    [address] [facility] [address]   [phone]                  90 tablet 0     Sig: TAKE 1 TABLET (20 MG) BY MOUTH DAILY    SSRIs Protocol Passed    9/7/2018  4:15 AM       Passed - Recent (12 mo) or future (30 days) visit within the authorizing provider's specialty    Patient had office visit in the last 12 months or has a visit in the next 30 days with authorizing provider or within the authorizing provider's specialty.  See \"Patient Info\" tab in inbasket, or \"Choose Columns\" in Meds & Orders section of the refill encounter.           Passed - Patient is age 18 or older        atenolol (TENORMIN) 25 MG tablet [Pharmacy Med Name: ATENOLOL 25 MG TABLET]    Last Written Prescription Date: 6/8/18*  Last Fill Quantity: 90 tablet,  # refills: 0   Last office visit: 1/31/2018 with prescribing provider:  Malcolm Wild Office Visit:   Next 5 appointments (look out 90 days)     Sep 07, 2018  9:30 AM CDT   Return Visit with BRIDGET Payne [facility]    [address] [facility] [address]   [phone]                  90 tablet 0     Sig: TAKE 1 TABLET (25 MG) BY MOUTH EVERY MORNING    Beta-Blockers Protocol Passed    9/7/2018  4:15 AM       Passed - Blood pressure under 140/90 in past 12 months    BP Readings from Last 3 Encounters:   02/06/18 136/78   01/31/18 132/80   01/08/18 138/84                Passed - Patient is age 6 or " "older       Passed - Recent (12 mo) or future (30 days) visit within the authorizing provider's specialty    Patient had office visit in the last 12 months or has a visit in the next 30 days with authorizing provider or within the authorizing provider's specialty.  See \"Patient Info\" tab in inbasket, or \"Choose Columns\" in Meds & Orders section of the refill encounter.              "

## 2018-09-07 NOTE — PROGRESS NOTES
"Name: Steve \"Caio\" Cathy  Seen at the request of Lisa Pierre for Diabetes (Last seen 2/6/2018).  HPI:  Setve Morgan is a 58 year old male who presents for the evaluation/management of:    1. Type 2 DM:  Orginally diagnosed at the age of 33 or 38.  Was prediabetic prior, was not particularly symptomatic at the time, was noted on routine lab work    Current Regimen: Metformin 1000 mg bid, glipzide 20 mg q am, Lantus 26 units qd (started 5/2017)     BS checks: None - not wearing Nina since April.    Meter Download: brought Nina but no data since April    Elevated blood sugar due to continued dietary indiscretions, states he hasn't been watching his diet as carefully.  Likes to snack between meals, buys snacks at the gas station.      Complications:   Diabetes Complications  Description / Detail    Diabetic Retinopathy  No retinopathy, early cataract, last exam 7/2018.  Cataract surgery yesterday, right eye scheduled 2/19/18   CAD / PAD  No   Neuropathy  Yes: numbness hands and feet   Nephropathy / Microalbuminuria  No   Gastroparesis  No   Hypoglycemia [place]  Not sure, gets 'kind of dizzy' when blood sugar is low but reports history of low blood sugars without symptoms     2. Hypertension: Blood Pressure today:   BP Readings from Last 3 Encounters:   09/07/18 126/80   02/06/18 136/78   01/31/18 132/80   .  Blood pressure medications include atenolol 25 mg qd and losartan 50 mg qd  .  Takes medications everyday without forgetting a dose.  Denies feeling lightheaded or dizzy.   3. Hyperlipidemia: Takes simvastatin 20 mg for lipid control.  Denies muscle aches of pains.       4. Prevention:  Flu Shot- 9/2017  Pneumovax- 2012  Opthalmology-Yes: dilated eye exam 6/2017, cataract surgery yesterday + 2/19/2018  Dental-Yes: twice a year  ASA-Yes, 81 mg qd   Smoking- No  5.  Hypothyroidism. Currently treated with levoxyl 150 mcg daily.  This dose was decreased 3 months ago due to low TSH and elevated T4. " Takes the levothyroxine first thing in the morning and waits 30+ minutes before eating breakfast.    PMH/PSH:  Past Medical History:   Diagnosis Date     Cellulitis and abscess of trunk 6/27/2017     Depression      Hyperlipidemia LDL goal <100 10/31/2010     Hypertension goal BP (blood pressure) < 140/90 3/17/2011     Hypothyroidism 1/12/2010     Morbid obesity due to excess calories (H) 1/12/2010     Neuropathy in diabetes (H) 1/12/2010     Obesity 1/12/2010     Sleep apnea 1/12/2010    CPAP     Type 2 diabetes, HbA1C goal < 8% (H) 3/8/2011     Past Surgical History:   Procedure Laterality Date     COLONOSCOPY       GENITOURINARY SURGERY      surg for undescended testicle     REPAIR HAMMER TOE BILATERAL  5/16/2013    Procedure: REPAIR HAMMER TOE BILATERAL;  Flexor Tenotomy Toes 2,3,4,5 Bilateral Feet;  Surgeon: Saad Bangura DPM;  Location: [facility] OR     Family Hx:  Family History   Problem Relation Age of Onset     Breast Cancer Mother      Hypertension Mother      Thyroid Disease Mother      Depression Mother      Cancer - colorectal Father      Thyroid Disease Father      Depression Father      HEART DISEASE Maternal Grandmother      CHF     Circulatory Paternal Grandmother      Cancer Paternal Grandfather      Diabetes Brother      Diabetes Brother      Thyroid disease: Yes: mother         DM2: Yes: one brother with type 1 diabetes and one brother with type 2 diabetes, paternal aunt with DM2         Autoimmune: DM1, SLE, RA, Vitiligo Yes: brother with DM1    Social Hx:  Social History     Social History     Marital status:      Spouse name: Sri     Number of children: 2     Years of education: N/A     Occupational History      None      [employer]     Social History Main Topics     Smoking status: Never Smoker     Smokeless tobacco: Never Used     Alcohol use Yes      Comment: occ     Drug use: No     Sexual activity: Yes     Partners: Female     Other Topics Concern     Parent/Sibling W/  Cabg, Mi Or Angioplasty Before 65f 55m? No     Social History Narrative          MEDICATIONS:  has a current medication list which includes the following prescription(s): ammonium lactate, aspirin, atenolol, blood glucose monitoring, blood glucose monitoring, blood glucose monitoring, blood glucose monitoring, bupropion, calcium carb-cholecalciferol, citalopram, freestyle juan reader, continuous blood glucose monitoring, cyanocobalamin, ferrous sulfate, finasteride, glipizide, hydrocortisone, insulin glargine, insulin pen needle, levothyroxine, losartan, metformin, multi-vitamin, omeprazole-sodium bicarbonate, order for dme, order for dme, simvastatin, UNABLE TO FIND, and cholecalciferol, and the following Facility-Administered Medications: cefazolin.    ROS     ROS: 10 point ROS neg other than the symptoms noted above in the HPI.    Physical Exam   VS: /80 (BP Location: Left arm, Patient Position: Chair, Cuff Size: Adult Large)  Pulse 84  Wt 106.6 kg (235 lb)  BMI 39.11 kg/m2  GENERAL: AXOX3, NAD, well dressed, answering questions appropriately, appears stated age.  HEENT: no exopthalmous, no proptosis, no lig lag, no retraction  CV: RRR, no rubs, gallops, no murmurs  LUNGS: CTAB, no wheezes, rales, or ronchi  EXTREMITIES: trace edema at the ankles  NEUROLOGY: CN grossly intact, no tremors  MSK: grossly intact  SKIN: no rashes, no lesions    LABS:  A1c:   Component      Latest Ref Rng & Units 9/7/2017 12/8/2017 6/28/2018   Hemoglobin A1C      0 - 5.6 % 8.5 (H) 9.0 (H) 9.0 (H)     Basic Metabolic Panel:  !COMPREHENSIVE Latest Ref Rng & Units 2/1/2018   SODIUM 133 - 144 mmol/L 128 (L)   POTASSIUM 3.4 - 5.3 mmol/L 4.7   CHLORIDE 94 - 109 mmol/L 93 (L)   BUN 7 - 30 mg/dL 13   Creatinine 0.66 - 1.25 mg/dL 0.97   Glucose 70 - 99 mg/dL 274 (H)   ANION GAP 3 - 14 mmol/L 7   CALCIUM 8.5 - 10.1 mg/dL 8.6     LFTS/Cholesterol Panel:  !LIPID/HEPATIC Latest Ref Rng & Units 5/17/2017   CHOLESTEROL <200 mg/dL 192    TRIGLYCERIDES <150 mg/dL 179 (H)   HDL CHOLESTEROL >39 mg/dL 30 (L)   LDL CHOLESTEROL, CALCULATED <100 mg/dL 126 (H)   VLDL-CHOLESTEROL 0 - 30 mg/dL    NON HDL CHOLESTEROL <130 mg/dL 162 (H)   CHOLESTEROL/HDL RATIO 0.0 - 5.0    AST 0 - 45 U/L 22   ALT 0 - 70 U/L 35     Thyroid Function:   !THYROID Latest Ref Rng & Units 6/28/2018 12/8/2017 12/22/2016   TSH 0.40 - 4.00 mU/L 0.02 (L) 0.07 (L) 0.48   T4 FREE 0.76 - 1.46 ng/dL 1.55 (H) 1.59 (H)      Urine MicroAlbumin:  Component    Latest Ref Rng & Units 11/15/2016   Creatinine Urine    mg/dL 17   Albumin Urine mg/L    mg/L <5   Albumin Urine mg/g Cr    0 - 17 mg/g Cr Unable to calc     Vitamin D:    All pertinent notes, labs, and images personally reviewed by me.     A/P  Mr.Walter Morgan is a 57 year old here for the evaluation/management of diabetes:    1. DM2 - Uncontrolled.    Diabetes is complicated by neuropathy  No blood sugar data for review today.  See diabetes ed today to review Nina insertion.  C/o limited use of his hands and increasing difficulty with ADL's - referral to [facility] for hand therapy.  Too soon to repeat A1c today - he'll make a lab appointment to recheck A1c the first week in October.    He has a difficult time testing his blood sugar due to significant hand tremors.    Use Nina consistently.  There is some variability among people, most will usually develop symptoms suggestive of hypoglycemia when blood glucose levels are lowered to the mid 60's. The first set of symptoms are called adrenergic. Patients may experience any of the following nervousness, sweating, intense hunger, trembling, weakness, palpitations, and difficulty speaking.   The acute management of hypoglycemia involves the rapid delivery of a source of easily absorbed sugar. Regular soda, juice, lifesavers, table sugar, are good options. 15 grams of glucose is the dose that is given, followed by an assessment of symptoms and a blood glucose check if possible. If after 10  minutes there is no improvement, another 10-15 grams should be given. This can be repeated up to three times. The equivalency of 10-15 grams of glucose (approximate servings) are: 3-5 hard candies, 3 teaspoons of sugar, or 1/2 cup of regular soda or juice.      2. Hypothyroidism.  Currently treated with Levoxyl (KAMERON) 175 mcg/day.  Will obtain TFT's today and adjust levoxyl dose if indicated.    3.  Hypertension - Variable.      4. Hyperlipidemia - On statin therapy.    Labs ordered today:   Orders Placed This Encounter   Procedures     FLU VACCINE, (RIV4) RECOMBINANT HA  , IM (FluBlok, egg free) [08654]- >18 YRS (FM recommended  50-64 YRS)     Vaccine Administration, Initial [72650]     TSH     T4 FREE     PHYSICAL THERAPY REFERRAL     [facility] PT, HAND, AND CHIROPRACTIC REFERRAL       Radiology/Consults ordered today: C RIV4 (FLUBLOK) VACCINE RECOMBINANT DNA PRSRV ANTIBIO FREE, IM  HC VACCINE ADMINISTRATION, INITIAL  PHYSICAL THERAPY REFERRAL  [facility] PT, HAND, AND CHIROPRACTIC REFERRAL    All questions were answered.  The patient indicates understanding of the above issues and agrees with the plan set forth.  Total face to face time greater than or equal to 25 minutes.       Follow-up:  See diabetes ed today to review Nina insertion  3 months with mahendra Butler NP  Endocrinology  [facility]  CC: Lisa Pierre    Addendum:  Labs reviewed - TSH low with elevated free T4.   Plan:  Decrease levoxyl to 137 mcg/day.  Obtain labs in 6 weeks: TSH, T4, T3, thyroid antibodies.

## 2018-09-07 NOTE — LETTER
9/7/2018         RE: Steve Morgan  [id number]  [facility] [address]        Dear Colleague,    Thank you for referring your patient, Steve Morgan, to the [facility]. Please see a copy of my visit note below.    Diabetes Self Management Training:    Pt is being seen today by Diana Butler NP who has requested assistance with personal CGM placement.    INTERVENTION:    Patient was able to demonstrate ability to insert and start sensor without difficulty, with minimal assistance.    PLAN:  Per patient request- scheduled CDE follow up to discuss meal planning.     Follow-up:    Follow-up appointment scheduled on 9/26/18 at 10:30.  Pt will start keeping a food log.    Lucía Chávez RD, CDE  Diabetes     Time Spent: 10 minutes  Encounter Type: Individual  No charge visit    Any diabetes medication dose changes were made via the CDE Protocol and Collaborative Practice Agreement with the patient's endocrinology provider. A copy of this encounter was shared with the provider.

## 2018-09-08 LAB
T4 FREE SERPL-MCNC: 1.61 NG/DL (ref 0.76–1.46)
TSH SERPL DL<=0.005 MIU/L-ACNC: 0.06 MU/L (ref 0.4–4)

## 2018-09-10 RX ORDER — ATENOLOL 25 MG/1
TABLET ORAL
Qty: 90 TABLET | Refills: 0 | Status: SHIPPED | OUTPATIENT
Start: 2018-09-10 | End: 2018-11-19

## 2018-09-10 NOTE — TELEPHONE ENCOUNTER
PHQ-9 score:    PHQ-9 SCORE 6/7/2018   Total Score -   Total Score MyChart 10 (Moderate depression)   Total Score 10       Routing refill request to provider for review/approval because:  PHQ-9>5. Dx: Excessive Anger. No sure if PHQ-9 needed for update.     Patient due for annual px. Ok for refills until then?    Kiarra BARAKAT RN, BSN, PHN  [place] [facility] RN

## 2018-09-10 NOTE — TELEPHONE ENCOUNTER
Atenolol    Prescription approved per [facility] Refill Protocol.    Kiarra BARAKAT RN, BSN, PHN  [place] [facility] RN

## 2018-09-11 RX ORDER — CITALOPRAM HYDROBROMIDE 20 MG/1
TABLET ORAL
Qty: 90 TABLET | Refills: 0 | Status: SHIPPED | OUTPATIENT
Start: 2018-09-11 | End: 2018-09-18

## 2018-09-13 ENCOUNTER — TELEPHONE (OUTPATIENT)
Dept: ENDOCRINOLOGY | Facility: CLINIC | Age: 58
End: 2018-09-13

## 2018-09-13 RX ORDER — LEVOTHYROXINE SODIUM 137 UG/1
137 TABLET ORAL DAILY
Qty: 30 TABLET | Refills: 1 | Status: SHIPPED | OUTPATIENT
Start: 2018-09-13 | End: 2018-10-18

## 2018-09-13 NOTE — TELEPHONE ENCOUNTER
Notes Recorded by Daisy Bradley RN on 9/13/2018 at 10:50 AM  L/M to call or check mychart.  See telephone encounter.  Daisy Bradley RN    ------    Notes Recorded by Diana Butler APRN CNP on 9/13/2018 at 4:21 AM  Please call  Caio,  Your thyroid levels are still too high.  I am reducing your Levoxyl dose to 137 mcg/day  Please make a lab appointment to recheck thyroid levels in another 6 weeks.  Diana Butler NP  Endocrinology

## 2018-09-13 NOTE — PROGRESS NOTES
Please call  Caio,  Your thyroid levels are still too high.  I am reducing your Levoxyl dose to 137 mcg/day  Please make a lab appointment to recheck thyroid levels in another 6 weeks.  Diana Butler NP  Endocrinology

## 2018-09-18 ENCOUNTER — OFFICE VISIT (OUTPATIENT)
Dept: FAMILY MEDICINE | Facility: CLINIC | Age: 58
End: 2018-09-18
Payer: COMMERCIAL

## 2018-09-18 VITALS
HEART RATE: 89 BPM | SYSTOLIC BLOOD PRESSURE: 130 MMHG | TEMPERATURE: 98.2 F | RESPIRATION RATE: 16 BRPM | WEIGHT: 232 LBS | DIASTOLIC BLOOD PRESSURE: 84 MMHG | BODY MASS INDEX: 38.61 KG/M2

## 2018-09-18 DIAGNOSIS — I10 HYPERTENSION GOAL BP (BLOOD PRESSURE) < 140/90: ICD-10-CM

## 2018-09-18 DIAGNOSIS — R25.1 TREMOR OF BOTH HANDS: ICD-10-CM

## 2018-09-18 DIAGNOSIS — E78.5 HYPERLIPIDEMIA LDL GOAL <100: ICD-10-CM

## 2018-09-18 DIAGNOSIS — R45.4 EXCESSIVE ANGER: ICD-10-CM

## 2018-09-18 DIAGNOSIS — E11.40 TYPE 2 DIABETES MELLITUS WITH DIABETIC NEUROPATHY, UNSPECIFIED LONG TERM INSULIN USE STATUS: Primary | ICD-10-CM

## 2018-09-18 PROCEDURE — 80053 COMPREHEN METABOLIC PANEL: CPT | Performed by: PHYSICIAN ASSISTANT

## 2018-09-18 PROCEDURE — 36415 COLL VENOUS BLD VENIPUNCTURE: CPT | Performed by: PHYSICIAN ASSISTANT

## 2018-09-18 PROCEDURE — 99214 OFFICE O/P EST MOD 30 MIN: CPT | Performed by: PHYSICIAN ASSISTANT

## 2018-09-18 PROCEDURE — 80061 LIPID PANEL: CPT | Performed by: PHYSICIAN ASSISTANT

## 2018-09-18 RX ORDER — CITALOPRAM HYDROBROMIDE 20 MG/1
TABLET ORAL
Qty: 90 TABLET | Refills: 1 | Status: SHIPPED | OUTPATIENT
Start: 2018-09-18 | End: 2019-04-17

## 2018-09-18 RX ORDER — BUPROPION HYDROCHLORIDE 300 MG/1
300 TABLET ORAL EVERY MORNING
Qty: 90 TABLET | Refills: 1 | Status: SHIPPED | OUTPATIENT
Start: 2018-09-18 | End: 2019-04-17

## 2018-09-18 NOTE — PROGRESS NOTES
SUBJECTIVE:   Steve Morgan is a 58 year old male who presents to clinic today for the following health issues:      Depression Followup    Status since last visit: Stable     See PHQ-9 for current symptoms.  Other associated symptoms: emotional during certain TV shows, this is new to him    Complicating factors:   Significant life event:  No   Current substance abuse:  None  Anxiety or Panic symptoms:  No    PHQ-9 12/12/2017 1/31/2018 6/7/2018   Total Score 8 4 10   Q9: Suicide Ideation Not at all Not at all Not at all       PHQ-9  English  PHQ-9   Any Language  Suicide Assessment Five-step Evaluation and Treatment (SAFE-T)    Amount of exercise or physical activity: None    Problems taking medications regularly: No    Medication side effects: none    Diet: regular (no restrictions)        Diabetes Follow-up    Patient is checking blood sugars: three times daily.   Blood sugar testing frequency justification: Frequent hypoglycemia, Uncontrolled diabetes, Adjustment of medication(s) and Risk of hypoglycemia with medication(s)  Results are as follows:    Diabetic concerns: blood sugar frequently over 200     Symptoms of hypoglycemia (low blood sugar): shaky     Paresthesias (numbness or burning in feet) or sores: Yes chronic neuropathy     Date of last diabetic eye exam:     Diabetes Management Resources    Hyperlipidemia Follow-Up      Rate your low fat/cholesterol diet?: good    Taking statin?  Yes, no muscle aches from statin    Other lipid medications/supplements?:  none    Hypertension Follow-up      Outpatient blood pressures are not being checked.    Low Salt Diet: no added salt    BP Readings from Last 2 Encounters:   09/18/18 130/84   09/07/18 126/80     Hemoglobin A1C (%)   Date Value   06/28/2018 9.0 (H)   12/08/2017 9.0 (H)     LDL Cholesterol Calculated (mg/dL)   Date Value   05/17/2017 126 (H)   05/19/2016 78     Patient has always had a little tremor in hands, but has noticed it has gotten worse.      Problem list and histories reviewed & adjusted, as indicated.  Additional history: as documented    Patient Active Problem List   Diagnosis     Anemia     Morbid obesity due to excess calories (H)     Sleep apnea     Neuropathy in diabetes (H)     Hypothyroidism     HYPERLIPIDEMIA LDL GOAL <100     Hypertension goal BP (blood pressure) < 140/90     Peripheral arterial disease (H)     Tremor     Peripheral neuropathy     Excessive anger     Generalized muscle weakness     Health Care Home     Unsteady gait     DAMIÁN (obstructive sleep apnea)     Vitamin B12 deficiency without anemia     Hyponatremia     Chronic hyponatremia     Type 2 diabetes mellitus with diabetic neuropathy (H)     Depression     Cellulitis and abscess of trunk     Benign prostatic hyperplasia with urinary hesitancy     Past Surgical History:   Procedure Laterality Date     COLONOSCOPY       GENITOURINARY SURGERY      surg for undescended testicle     REPAIR HAMMER TOE BILATERAL  5/16/2013    Procedure: REPAIR HAMMER TOE BILATERAL;  Flexor Tenotomy Toes 2,3,4,5 Bilateral Feet;  Surgeon: Saad Bangura DPM;  Location:  OR       Social History   Substance Use Topics     Smoking status: Never Smoker     Smokeless tobacco: Never Used     Alcohol use Yes      Comment: occ     Family History   Problem Relation Age of Onset     Breast Cancer Mother      Hypertension Mother      Thyroid Disease Mother      Depression Mother      Cancer - colorectal Father      Thyroid Disease Father      Depression Father      HEART DISEASE Maternal Grandmother      CHF     Circulatory Paternal Grandmother      Cancer Paternal Grandfather      Diabetes Brother      Diabetes Brother            Reviewed and updated as needed this visit by clinical staff  Tobacco  Allergies  Meds  Med Hx  Surg Hx  Fam Hx  Soc Hx      Reviewed and updated as needed this visit by Provider         ROS:  Constitutional, HEENT, cardiovascular, pulmonary, gi and gu systems are  negative, except as otherwise noted.    OBJECTIVE:     /84 (BP Location: Right arm, Patient Position: Chair, Cuff Size: Adult Large)  Pulse 89  Temp 98.2  F (36.8  C) (Oral)  Resp 16  Wt 232 lb (105.2 kg)  BMI 38.61 kg/m2  Body mass index is 38.61 kg/(m^2).  GENERAL APPEARANCE: healthy, alert and no distress  RESP: lungs clear to auscultation - no rales, rhonchi or wheezes  CV: regular rates and rhythm, normal S1 S2, no S3 or S4 and no murmur, click or rub  ABDOMEN: soft, nontender, without hepatosplenomegaly or masses and bowel sounds normal  NEURO: bilateral hand tremors  PSYCH: mentation appears normal and affect flat        ASSESSMENT/PLAN:             1. Type 2 diabetes mellitus with diabetic neuropathy, unspecified long term insulin use status (H)  Continue f/u with Endo.   Due for A1c soon  - blood glucose monitoring ([employer]ather[employer]ET) lancets; Use to test blood sugar 3 times daily or as directed.  Dispense: 100 each; Refill: 11    2. Hyperlipidemia LDL goal <100  Await labs.   - Lipid panel reflex to direct LDL Fasting    3. Hypertension goal BP (blood pressure) < 140/90    - Comprehensive metabolic panel    4. Excessive anger  Stable. Recheck 6 motnysh  - citalopram (CELEXA) 20 MG tablet; TAKE 1 TABLET (20 MG) BY MOUTH DAILY  Dispense: 90 tablet; Refill: 1  - buPROPion (WELLBUTRIN XL) 300 MG 24 hr tablet; Take 1 tablet (300 mg) by mouth every morning  Dispense: 90 tablet; Refill: 1    5. Tremor of both hands  Referral placed given progression and neuropathy   - NEUROLOGY ADULT REFERRAL              Lisa Pierre PA-C  [facility]

## 2018-09-18 NOTE — MR AVS SNAPSHOT
After Visit Summary   9/18/2018    Steve Morgan    MRN: [id number]           Patient Information     Date Of Birth          1960        Visit Information        Provider Department      9/18/2018 9:00 AM Lisa Pierre PA-C [facility]        Today's Diagnoses     Screening for HIV (human immunodeficiency virus)        Screening for diabetic peripheral neuropathy           Follow-ups after your visit        Your next 10 appointments already scheduled     Sep 26, 2018 10:30 AM CDT   Diabetes Education with Lucía Chávez RD   [facility] [place] [facility])    [id number] [place] [address]   [phone]            Oct 09, 2018  9:00 AM CDT   LAB with [facility] LAB   [facility]    [address] [place] [address]   [phone]           Please do not eat 10-12 hours before your appointment if you are coming in fasting for labs on lipids, cholesterol, or glucose (sugar). This does not apply to pregnant women. Water, hot tea and black coffee (with nothing added) are okay. Do not drink other fluids, diet soda or chew gum.            Oct 16, 2018  9:20 AM CDT   Travel Education with London Escalera PA-C   [facility]    [address] [facility] [address]   [phone]           Bring Medication List Bring International Certificate of Vaccination (Yellow Card) or Vaccination Records Adults need to check date of last TD. all insurance conmpany regarding benefits for travel consultation and vaccinations.            Dec 14, 2018  9:30 AM CST   Return Visit with BRIDGET Payne CNP   [facility]    [address] [facility] [address]   [phone]              Who to contact     If you have questions or need follow up  information about today's clinic visit or your schedule please contact [facility] directly at [phone].  Normal or non-critical lab and imaging results will be communicated to you by MyChart, letter or phone within 4 business days after the clinic has received the results. If you do not hear from us within 7 days, please contact the clinic through [employer] or phone. If you have a critical or abnormal lab result, we will notify you by phone as soon as possible.  Submit refill requests through [employer] or call your pharmacy and they will forward the refill request to us. Please allow 3 business days for your refill to be completed.          Additional Information About Your Visit        [employer] Information     [employer] gives you secure access to your electronic health record. If you see a primary care provider, you can also send messages to your care team and make appointments. If you have questions, please call your primary care clinic.  If you do not have a primary care provider, please call [phone] and they will assist you.        Care EveryWhere ID     This is your Care EveryWhere ID. This could be used by other organizations to access your [employer] medical records  [id number]        Your Vitals Were     Pulse Temperature Respirations BMI (Body Mass Index)          89 98.2  F (36.8  C) (Oral) 16 38.61 kg/m2         Blood Pressure from Last 3 Encounters:   09/18/18 130/84   09/07/18 126/80   02/06/18 136/78    Weight from Last 3 Encounters:   09/18/18 232 lb (105.2 kg)   09/07/18 235 lb (106.6 kg)   03/13/18 242 lb 8.1 oz (110 kg)              Today, you had the following     No orders found for display       Primary Care Provider Office Phone # Fax #    Lisa Pierre PA-C [phone] [address]  [facility] [address]        Equal Access to Services     KE ALMONTE : Ros Alexander, erasto rodriguez, judy baum, jayson gordon  shantidestinee josafatherson la'aan ah. So [facility] [phone].    ATENCIÓN: Si agustín neal, tiene a adler disposición servicios gratuitos de asistencia lingüística. Jean bates [phone].    We comply with applicable federal civil rights laws and [place] laws. We do not discriminate on the basis of race, color, national origin, age, disability, sex, sexual orientation, or gender identity.            Thank you!     Thank you for choosing [facility]  for your care. Our goal is always to provide you with excellent care. Hearing back from our patients is one way we can continue to improve our services. Please take a few minutes to complete the written survey that you may receive in the mail after your visit with us. Thank you!             Your Updated Medication List - Protect others around you: Learn how to safely use, store and throw away your medicines at www.disposemymeds.org.          This list is accurate as of 9/18/18  9:05 AM.  Always use your most recent med list.                   Brand Name Dispense Instructions for use Diagnosis    ammonium lactate 12 % cream    LAC-HYDRIN    385 g    Apply topically 2 times daily as needed for dry skin    Diabetic polyneuropathy associated with type 2 diabetes mellitus (H), Pre-ulcerative calluses, Pes planus of both feet       aspirin 81 MG tablet     100    ONE DAILY        atenolol 25 MG tablet    TENORMIN    90 tablet    TAKE 1 TABLET (25 MG) BY MOUTH EVERY MORNING    Essential hypertension with goal blood pressure less than 140/90       * blood glucose monitoring lancets     1 Box    Use to test blood sugars 2 times daily or as directed.    Type 2 diabetes, HbA1C goal < 8% (H)       * blood glucose monitoring lancets     100 each    Use to test blood sugar 3 times daily or as directed.    Type 2 diabetes mellitus with diabetic neuropathy, unspecified long term insulin use status (H)       * blood glucose monitoring test strip    ONETOUCH VERIO IQ    100 strip    Use  to test blood sugars 2 times daily or as directed.    Type 2 diabetes, HbA1C goal < 8% (H)       * blood glucose monitoring test strip    KERLINE CONTOUR NEXT    100 strip    Use to test blood sugar 3 times daily or as directed.    Type 2 diabetes mellitus with diabetic neuropathy, unspecified long term insulin use status (H)       buPROPion 300 MG 24 hr tablet    WELLBUTRIN XL    90 tablet    TAKE 1 TABLET (300 MG) BY MOUTH EVERY MORNING    Excessive anger       CALCIUM 600 + D PO      Take 1 tablet by mouth daily        citalopram 20 MG tablet    celeXA    90 tablet    TAKE 1 TABLET (20 MG) BY MOUTH DAILY    Excessive anger       continuous blood glucose monitoring sensor     3 each    For use with Freestyle Nina Flash  for continuous monitioring of blood glucose levels. Replace sensor every 10 days.    Type 2 diabetes mellitus with diabetic neuropathy, with long-term current use of insulin (H)       ferrous sulfate 325 (65 Fe) MG tablet    IRON     Take 1 tablet by mouth daily (with breakfast).        finasteride 5 MG tablet    PROSCAR    90 tablet    Take 1 tablet (5 mg) by mouth daily    Benign prostatic hyperplasia with urinary hesitancy       FREESTYLE NINA READER Georgette     1 Device    1 kit as needed    Type 2 diabetes mellitus with diabetic neuropathy, with long-term current use of insulin (H)       glipiZIDE 10 MG 24 hr tablet    GLUCOTROL XL    180 tablet    Take 2 tablets (20 mg) by mouth every morning    Type 2 diabetes mellitus with diabetic neuropathy, without long-term current use of insulin (H)       hydrocortisone 0.2 % cream    WESTCORT    45 g    Apply topically 2 times daily as needed Apply sparingly to affected area, BID.    Dermatitis       insulin glargine 100 UNIT/ML injection    LANTUS SOLOSTAR    45 mL    26 units qd.  Increase as directed to max dose of 45 units qd.    Type 2 diabetes mellitus with diabetic neuropathy, with long-term current use of insulin (H)       insulin pen  needle 31G X 5 MM    B-D U/F    100 each    Use 1 daily or as directed.    Type 2 diabetes mellitus with diabetic neuropathy, unspecified long term insulin use status (H)       LEVOXYL 137 MCG tablet   Generic drug:  levothyroxine     30 tablet    Take 1 tablet (137 mcg) by mouth daily    Type 2 diabetes mellitus with diabetic neuropathy, with long-term current use of insulin (H), Hypothyroidism due to acquired atrophy of thyroid, Need for prophylactic vaccination and inoculation against influenza, Type 2 diabetes mellitus with diabetic neuropathy, without long-term current use of insulin (H), Dermatitis       losartan 50 MG tablet    COZAAR    90 tablet    TAKE 1 TABLET DAILY    Essential hypertension with goal blood pressure less than 140/90       metFORMIN 1000 MG tablet    GLUCOPHAGE    180 tablet    TAKE 1 TABLET (1,000 MG) BY MOUTH 2 TIMES DAILY (WITH MEALS)    Type 2 diabetes mellitus with diabetic neuropathy, without long-term current use of insulin (H)       Multi-vitamin Tabs tablet   Generic drug:  multivitamin, therapeutic with minerals     30    1 TABLET DAILY        omeprazole-sodium bicarbonate  MG per capsule    ZEGERID     Take 1 capsule by mouth every morning (before breakfast)        order for DME     1 Units    Equipment being ordered: single cane with rubber foot    Type 2 diabetes, HbA1C goal < 8% (H), Neuropathy in diabetes (H), Unsteady gait       order for DME     1 Units    Equipment being ordered: four pronged cane    Unsteady gait, Neuropathy in diabetes (H), Type 2 diabetes, HbA1C goal < 8% (H)       simvastatin 20 MG tablet    ZOCOR    90 tablet    Take 1 tablet (20 mg) by mouth At Bedtime    Hyperlipidemia LDL goal <100       UNABLE TO FIND      MEDICATION NAME: Prednisolone Acetate, Gatifloxacin and Bromfenac 1/0.5/0.075% - instill one drop in the surgery eye three times daily beginning 2 days before surgery        VITAMIN B 12 PO      Take 250 mcg by mouth daily    Excessive  anger       VITAMIN D (CHOLECALCIFEROL) PO      Take 1,000 Units by mouth daily.        * Notice:  This list has 4 medication(s) that are the same as other medications prescribed for you. Read the directions carefully, and ask your doctor or other care provider to review them with you.

## 2018-09-19 LAB
ALBUMIN SERPL-MCNC: 3.9 G/DL (ref 3.4–5)
ALP SERPL-CCNC: 83 U/L (ref 40–150)
ALT SERPL W P-5'-P-CCNC: 30 U/L (ref 0–70)
ANION GAP SERPL CALCULATED.3IONS-SCNC: 9 MMOL/L (ref 3–14)
AST SERPL W P-5'-P-CCNC: 23 U/L (ref 0–45)
BILIRUB SERPL-MCNC: 0.3 MG/DL (ref 0.2–1.3)
BUN SERPL-MCNC: 15 MG/DL (ref 7–30)
CALCIUM SERPL-MCNC: 9.2 MG/DL (ref 8.5–10.1)
CHLORIDE SERPL-SCNC: 99 MMOL/L (ref 94–109)
CHOLEST SERPL-MCNC: 188 MG/DL
CO2 SERPL-SCNC: 25 MMOL/L (ref 20–32)
CREAT SERPL-MCNC: 1.08 MG/DL (ref 0.66–1.25)
GFR SERPL CREATININE-BSD FRML MDRD: 70 ML/MIN/1.7M2
GLUCOSE SERPL-MCNC: 218 MG/DL (ref 70–99)
HDLC SERPL-MCNC: 30 MG/DL
LDLC SERPL CALC-MCNC: 114 MG/DL
NONHDLC SERPL-MCNC: 158 MG/DL
POTASSIUM SERPL-SCNC: 5.3 MMOL/L (ref 3.4–5.3)
PROT SERPL-MCNC: 7.9 G/DL (ref 6.8–8.8)
SODIUM SERPL-SCNC: 133 MMOL/L (ref 133–144)
TRIGL SERPL-MCNC: 219 MG/DL

## 2018-09-20 ASSESSMENT — PATIENT HEALTH QUESTIONNAIRE - PHQ9: SUM OF ALL RESPONSES TO PHQ QUESTIONS 1-9: 12

## 2018-10-06 DIAGNOSIS — E11.40 TYPE 2 DIABETES MELLITUS WITH DIABETIC NEUROPATHY, WITH LONG-TERM CURRENT USE OF INSULIN (H): ICD-10-CM

## 2018-10-06 DIAGNOSIS — E03.4 HYPOTHYROIDISM DUE TO ACQUIRED ATROPHY OF THYROID: Primary | ICD-10-CM

## 2018-10-06 DIAGNOSIS — Z79.4 TYPE 2 DIABETES MELLITUS WITH DIABETIC NEUROPATHY, WITH LONG-TERM CURRENT USE OF INSULIN (H): ICD-10-CM

## 2018-10-06 NOTE — TELEPHONE ENCOUNTER
"Requested Prescriptions   Pending Prescriptions Disp Refills     LANTUS SOLOSTAR 100 UNIT/ML soln [Pharmacy Med Name: LANTUS SOLOSTAR 100 UNIT/ML]  1    Last Written Prescription Date:  18  Last Fill Quantity: 45mL  ,  # refills: 1   Last Office Visit: 2018   Future Office Visit:    Next 5 appointments (look out 90 days)     Dec 14, 2018  9:30 AM CST   [facility] BRIDGET [facility] CNP   [facility]    [address] [facility] [address] [facility] [address] [address]   [phone]                  Si UNITS QD. INCREASE AS DIRECTED TO MAX DOSE OF 45 UNITS QD.    Long Acting Insulin Protocol Failed    10/6/2018  2:31 AM       Failed - HgbA1C in past 3 or 6 months    If HgbA1C is 8 or greater, it needs to be on file within the past 3 months.  If less than 8, must be on file within the past 6 months.     Recent Labs   Lab Test  18   0845   A1C  9.0*            Passed - Blood pressure less than 140/90 in past 6 months    BP Readings from Last 3 Encounters:   18 130/84   18 126/80   18 136/78                Passed - LDL on file in past 12 months    Recent Labs   Lab Test  18   0921   LDL  114*            Passed - Microalbumin on file in past 12 months    Recent Labs   Lab Test  17   1016   MICROL  17   UMALCR  43.15*            Passed - Serum creatinine on file in past 12 months    Recent Labs   Lab Test  18   0921   CR  1.08            Passed - Patient is age 18 or older       Passed - Recent (6 mo) or future (30 days) visit within the authorizing provider's specialty    Patient had office visit in the last 6 months or has a visit in the next 30 days with authorizing provider or within the authorizing provider's specialty.  See \"Patient Info\" tab in inbasket, or \"Choose Columns\" in Meds & Orders section of the refill encounter.              "

## 2018-10-16 ENCOUNTER — OFFICE VISIT (OUTPATIENT)
Dept: FAMILY MEDICINE | Facility: CLINIC | Age: 58
End: 2018-10-16
Payer: COMMERCIAL

## 2018-10-16 VITALS
TEMPERATURE: 97.5 F | DIASTOLIC BLOOD PRESSURE: 80 MMHG | HEART RATE: 80 BPM | SYSTOLIC BLOOD PRESSURE: 136 MMHG | BODY MASS INDEX: 39.99 KG/M2 | HEIGHT: 65 IN | OXYGEN SATURATION: 99 % | WEIGHT: 240 LBS

## 2018-10-16 DIAGNOSIS — Z71.84 ENCOUNTER FOR COUNSELING FOR TRAVEL: Primary | ICD-10-CM

## 2018-10-16 DIAGNOSIS — Z23 NEED FOR IMMUNIZATION AGAINST TYPHOID: ICD-10-CM

## 2018-10-16 DIAGNOSIS — Z23 NEED FOR HEPATITIS A VACCINATION: ICD-10-CM

## 2018-10-16 PROCEDURE — 90472 IMMUNIZATION ADMIN EACH ADD: CPT | Performed by: PHYSICIAN ASSISTANT

## 2018-10-16 PROCEDURE — 90632 HEPA VACCINE ADULT IM: CPT | Performed by: PHYSICIAN ASSISTANT

## 2018-10-16 PROCEDURE — 90471 IMMUNIZATION ADMIN: CPT | Performed by: PHYSICIAN ASSISTANT

## 2018-10-16 PROCEDURE — 99401 PREV MED CNSL INDIV APPRX 15: CPT | Mod: 25 | Performed by: PHYSICIAN ASSISTANT

## 2018-10-16 PROCEDURE — 90691 TYPHOID VACCINE IM: CPT | Performed by: PHYSICIAN ASSISTANT

## 2018-10-16 RX ORDER — AZITHROMYCIN 500 MG/1
500 TABLET, FILM COATED ORAL DAILY
Qty: 3 TABLET | Refills: 0 | Status: SHIPPED | OUTPATIENT
Start: 2018-10-16 | End: 2019-04-17

## 2018-10-16 NOTE — PROGRESS NOTES
SUBJECTIVE: Steve Morgan , a 58 year old  male, presents for counseling and information regarding upcoming travel to [place]. Special medical concerns include: none. He anticipates the following unusual exposures: none.    Itinerary:  [place]     Departure Date: 11/3/18 Return date: 11/10/18    Reason for travel (i.e. Business, pleasure): pleasure     Visiting an urban or rural area?: urban     Accommodations (i.e. hotel, hostel, friends, family, etc): All inclusive resort     Women - First day of your last period: NA     IMMUNIZATION HISTORY  Have you received any vaccinations in the past 4 weeks?  No  Have you ever fainted from having your blood drawn or from an injection?  No  Have you ever had a fever reaction to vaccination?  No  Have you ever had any bad reaction or side effect from any vaccination?  No  Have you ever had hepatitis A or B vaccine?  No  Do you live (or work closely) with anyone who has AIDS, an AIDS-like condition, any other immune disorder or who is on chemotherapy for cancer?  No  Have you received any injection of immune globulin or any blood products during the past 12 months?  No    GENERAL MEDICAL HISTORY  Do you have a medical condition that warrants maintenance medication or physician follow-up?  Yes   Do you have a medical condition that is stable now, but that may recur while traveling?  No  Has your spleen been removed?  No  Have you had an acute illness or a fever in the past 48 hours?  No  Are you pregnant, or might you become pregnant on this trip?  Any chance of pregnancy?  No  Are you breastfeeding?  No  Do you have HIV, AIDS, an AIDS-like condition, any other immune disorder, leukemia or cancer?  No  Do you have a severe combined immunodeficiency disease?  No  Have you had your thymus gland removed or history of problems with your thymus, such as myasthenia gravis, DiGeorge syndrome, or thymoma?  No    Do you have severe thrombocytopenia (low platelet count) or a coagulation  disorder?  No  Have you ever had a convulsion, seizure, epilepsy, neurologic condition or brain infection?  No  Do you have any stomach conditions?  Yes   Do you have a G6PD deficiency?  Yes   Do you have severe renal or kidney impairment?  No  Do you have a history of psychiatric problems?  No  Do you have a problem with strange dreams and/or nightmares?  No  Do you have insomnia?  No  Do you have problems with vaginitis?  No  Do you have psoriasis?  No  Are you prone to motion sickness?  No  Have you ever had headaches, nausea, vomiting, or breathing problems from altitude exposure?  No      Past Medical History:   Diagnosis Date     Cellulitis and abscess of trunk 6/27/2017     Depression      Hyperlipidemia LDL goal <100 10/31/2010     Hypertension goal BP (blood pressure) < 140/90 3/17/2011     Hypothyroidism 1/12/2010     Morbid obesity due to excess calories (H) 1/12/2010     Neuropathy in diabetes (H) 1/12/2010     Obesity 1/12/2010     Sleep apnea 1/12/2010    CPAP     Type 2 diabetes, HbA1C goal < 8% (H) 3/8/2011      Immunization History   Administered Date(s) Administered     Influenza (H1N1) 11/04/2009     Influenza (IIV3) PF 11/12/1997, 10/29/1998, 11/03/1999, 11/08/2001, 10/11/2002, 10/18/2004, 10/26/2007, 11/24/2008, 11/08/2010, 10/01/2012     Influenza Quad, Recombinant, p-free (RIV4) 09/07/2018     Influenza Vaccine IM 3yrs+ 4 Valent IIV4 11/05/2013, 10/22/2014, 11/17/2015, 09/07/2017     Influenza Vaccine, 3 YRS +, IM (QUADRIVALENT W/PRESERVATIVES) 11/15/2016     MMR 03/21/1995     Pneumococcal 23 valent 10/26/2007, 10/05/2012     TD (ADULT, 7+) 03/21/1995     TDAP Vaccine (Adacel) 11/08/2010       Current Outpatient Prescriptions   Medication Sig Dispense Refill     ammonium lactate (LAC-HYDRIN) 12 % cream Apply topically 2 times daily as needed for dry skin 385 g 3     ASPIRIN 81 MG OR TABS ONE DAILY 100 3     atenolol (TENORMIN) 25 MG tablet TAKE 1 TABLET (25 MG) BY MOUTH EVERY MORNING 90  tablet 0     blood glucose (ONE TOUCH VERIO IQ) test strip Use to test blood sugars 2 times daily or as directed. 100 strip 0     blood glucose monitoring (KERLINE CONTOUR NEXT) test strip Use to test blood sugar 3 times daily or as directed. 100 strip 11     blood glucose monitoring (KERLINE MICROLET) lancets Use to test blood sugar 3 times daily or as directed. 100 each 11     blood glucose monitoring (ONE TOUCH DELICA) lancets Use to test blood sugars 2 times daily or as directed. 1 Box prn     buPROPion (WELLBUTRIN XL) 300 MG 24 hr tablet Take 1 tablet (300 mg) by mouth every morning 90 tablet 1     Calcium Carb-Cholecalciferol (CALCIUM 600 + D PO) Take 1 tablet by mouth daily       citalopram (CELEXA) 20 MG tablet TAKE 1 TABLET (20 MG) BY MOUTH DAILY 90 tablet 1     Continuous Blood Gluc  (FREESTYLE GIULIANA READER) SIENA 1 kit as needed 1 Device 1     continuous blood glucose monitoring (FREESTYLE GIULIANA) sensor For use with Freestyle Giuliana Flash  for continuous monitioring of blood glucose levels. Replace sensor every 10 days. 3 each 6     Cyanocobalamin (VITAMIN B 12 PO) Take 250 mcg by mouth daily       ferrous sulfate 325 (65 FE) MG tablet Take 1 tablet by mouth daily (with breakfast).       finasteride (PROSCAR) 5 MG tablet Take 1 tablet (5 mg) by mouth daily 90 tablet 3     glipiZIDE (GLUCOTROL XL) 10 MG 24 hr tablet Take 2 tablets (20 mg) by mouth every morning 180 tablet 1     hydrocortisone (WESTCORT) 0.2 % cream Apply topically 2 times daily as needed Apply sparingly to affected area, BID. 45 g 1     insulin glargine (LANTUS SOLOSTAR) 100 UNIT/ML injection 26 units qd.  Increase as directed to max dose of 45 units qd. 45 mL 1     insulin glargine (LANTUS SOLOSTAR) 100 UNIT/ML pen Inject 26 Units Subcutaneous daily 15 mL 2     insulin pen needle (B-D U/F) 31G X 5 MM Use 1 daily or as directed. 100 each 6     LEVOXYL 137 MCG tablet Take 1 tablet (137 mcg) by mouth daily 30 tablet 1     losartan  (COZAAR) 50 MG tablet TAKE 1 TABLET DAILY 90 tablet 2     metFORMIN (GLUCOPHAGE) 1000 MG tablet TAKE 1 TABLET (1,000 MG) BY MOUTH 2 TIMES DAILY (WITH MEALS) 180 tablet 3     MULTI-VITAMIN OR TABS 1 TABLET DAILY 30 0     omeprazole-sodium bicarbonate (ZEGERID)  MG per capsule Take 1 capsule by mouth every morning (before breakfast)       ORDER FOR DME Equipment being ordered: four pronged cane 1 Units 0     ORDER FOR DME Equipment being ordered: single cane with rubber foot 1 Units 0     simvastatin (ZOCOR) 20 MG tablet Take 1 tablet (20 mg) by mouth At Bedtime 90 tablet 3     UNABLE TO FIND MEDICATION NAME: Prednisolone Acetate, Gatifloxacin and Bromfenac 1/0.5/0.075% - instill one drop in the surgery eye three times daily beginning 2 days before surgery       VITAMIN D, CHOLECALCIFEROL, PO Take 1,000 Units by mouth daily.       No Known Allergies     EXAM: deferred    Immunizations discussed include: Hepatitis A and Typhoid  Malaraia prophylaxis recommended: not needed  Symptomatic treatment for traveler's diarrhea: bismuth subsalicylate, loperamide/diphenoxylate and azithromycin    ASSESSMENT/PLAN:    (Z71.89) Encounter for counseling for travel  (primary encounter diagnosis)    Comment: Hepatitis A and typhoid vaccines today. Patient will return or follow-up with PCP in 6 months for hep A booster. Prophylaxis given for Traveler's diarrhea and is not needed for Malaria. All questions were answered.     Plan: azithromycin (ZITHROMAX) 500 MG tablet            (Z23) Need for immunization against typhoid  Comment:   Plan: TYPHOID VACCINE, IM            (Z23) Need for hepatitis A vaccination  Comment:   Plan: HEPA VACCINE ADULT IM              I have reviewed general recommendations for safe travel   including: food/water precautions, insect avoidance, safe sex   practices given high prevalence of HIV and other STDs,   roadway safety. Educational materials and links to the CDC   Traveler's health website have  been provided.    Total time 20 minutes, greater than 50 percent in counseling   and coordination of care.

## 2018-10-16 NOTE — PATIENT INSTRUCTIONS
See travel packet provided  Recommend ultrathon, pepto bismol and imodium  Also bring hand  and sun screen with you.  Safe Travels       Today October 16, 2018 you received the    Hepatitis A Vaccine - Please return on 4/14/19 or later for your 2nd and final dose.    Typhoid - injectable. This vaccine is valid for two years.   .    These appointments can be made as a NURSE ONLY visit.    **It is very important for the vaccinations to be given on the scheduled day(s), this helps ensure you receive the full effectiveness of the vaccine.**    Please call [facility] with any questions [phone]    Thank you for visiting [facility]

## 2018-10-16 NOTE — MR AVS SNAPSHOT
After Visit Summary   10/16/2018    Steve Morgan    MRN: [id number]           Patient Information     Date Of Birth          1960        Visit Information        Provider Department      10/16/2018 9:20 AM London Escalera PA-C [facility]        Today's Diagnoses     Encounter for counseling for travel    -  1      Care Instructions    See travel packet provided  Recommend ultrathon, pepto bismol and imodium  Also bring hand  and sun screen with you.  Safe Travels       Today October 16, 2018 you received the    Hepatitis A Vaccine - Please return on 4/14/19 or later for your 2nd and final dose.    Typhoid - injectable. This vaccine is valid for two years.   .    These appointments can be made as a NURSE ONLY visit.    **It is very important for the vaccinations to be given on the scheduled day(s), this helps ensure you receive the full effectiveness of the vaccine.**    Please call [facility] with any questions [phone]    Thank you for visiting [facility]            Follow-ups after your visit        Your next 10 appointments already scheduled     Dec 14, 2018  9:30 AM CST   Return Visit with BRIDGET Payne CNP   [facility] ([facility]    [address]  [facility] [address] [phone]              Who to contact     If you have questions or need follow up information about today's clinic visit or your schedule please contact [facility] directly at [phone].  Normal or non-critical lab and imaging results will be communicated to you by MyChart, letter or phone within 4 business days after the clinic has received the results. If you do not hear from us within 7 days, please contact the clinic through MyChart or phone. If you have a critical or abnormal lab result, we will notify you by phone as soon as possible.  Submit refill requests  "through [employer] or call your pharmacy and they will forward the refill request to us. Please allow 3 business days for your refill to be completed.          Additional Information About Your Visit        [facility] Information     [employer] gives you secure access to your electronic health record. If you see a primary care provider, you can also send messages to your care team and make appointments. If you have questions, please call your primary care clinic.  If you do not have a primary care provider, please call [phone] and they will assist you.        Care EveryWhere ID     This is your Care EveryWhere ID. This could be used by other organizations to access your [place] medical records  [id number]        Your Vitals Were     Pulse Temperature Height Pulse Oximetry BMI (Body Mass Index)       80 97.5  F (36.4  C) (Oral) 5' 5\" (1.651 m) 99% 39.94 kg/m2        Blood Pressure from Last 3 Encounters:   10/16/18 136/80   09/18/18 130/84   09/07/18 126/80    Weight from Last 3 Encounters:   10/16/18 240 lb (108.9 kg)   09/18/18 232 lb (105.2 kg)   09/07/18 235 lb (106.6 kg)              Today, you had the following     No orders found for display         Today's Medication Changes          These changes are accurate as of 10/16/18  9:56 AM.  If you have any questions, ask your nurse or doctor.               Start taking these medicines.        Dose/Directions    azithromycin 500 MG tablet   Commonly known as:  ZITHROMAX   Used for:  Encounter for counseling for travel   Started by:  London Escalera PA-C        Dose:  500 mg   Take 1 tablet (500 mg) by mouth daily for 3 days For traveler's diarrhea.   Quantity:  3 tablet   Refills:  0            Where to get your medicines      These medications were sent to [facility]/pharmacy #[id number] - [place], [place] - [address]  [address] [place] [address]., [facility] [address]     Phone:  [phone]     azithromycin 500 MG tablet                Primary Care Provider Office Phone # Fax #    " Lisa Pierre PA-C [phone]       [address] [facility] [address]        Equal Access to Services     KE ALMONTE : Hadimla ana zheng martinez Alexander, waheenada luamara, qaireneta kaalmada sarika, jayson florentinokaren lana. So [facility] [phone].    ATENCIÓN: Si habla español, tiene a adler disposición servicios gratuitos de asistencia lingüística. Llame al [phone].    We comply with applicable federal civil rights laws and [place] laws. We do not discriminate on the basis of race, color, national origin, age, disability, sex, sexual orientation, or gender identity.            Thank you!     Thank you for choosing [facility]  for your care. Our goal is always to provide you with excellent care. Hearing back from our patients is one way we can continue to improve our services. Please take a few minutes to complete the written survey that you may receive in the mail after your visit with us. Thank you!             Your Updated Medication List - Protect others around you: Learn how to safely use, store and throw away your medicines at www.disposemymeds.org.          This list is accurate as of 10/16/18  9:56 AM.  Always use your most recent med list.                   Brand Name Dispense Instructions for use Diagnosis    ammonium lactate 12 % cream    LAC-HYDRIN    385 g    Apply topically 2 times daily as needed for dry skin    Diabetic polyneuropathy associated with type 2 diabetes mellitus (H), Pre-ulcerative calluses, Pes planus of both feet       aspirin 81 MG tablet     100    ONE DAILY        atenolol 25 MG tablet    TENORMIN    90 tablet    TAKE 1 TABLET (25 MG) BY MOUTH EVERY MORNING    Essential hypertension with goal blood pressure less than 140/90       azithromycin 500 MG tablet    ZITHROMAX    3 tablet    Take 1 tablet (500 mg) by mouth daily for 3 days For traveler's diarrhea.    Encounter for counseling for travel       * blood glucose  monitoring lancets     1 Box    Use to test blood sugars 2 times daily or as directed.    Type 2 diabetes, HbA1C goal < 8% (H)       * blood glucose monitoring lancets     100 each    Use to test blood sugar 3 times daily or as directed.    Type 2 diabetes mellitus with diabetic neuropathy, unspecified long term insulin use status       * blood glucose monitoring test strip    ONETOUCH VERIO IQ    100 strip    Use to test blood sugars 2 times daily or as directed.    Type 2 diabetes, HbA1C goal < 8% (H)       * blood glucose monitoring test strip    KERLINE CONTOUR NEXT    100 strip    Use to test blood sugar 3 times daily or as directed.    Type 2 diabetes mellitus with diabetic neuropathy, unspecified long term insulin use status       buPROPion 300 MG 24 hr tablet    WELLBUTRIN XL    90 tablet    Take 1 tablet (300 mg) by mouth every morning    Excessive anger       CALCIUM 600 + D PO      Take 1 tablet by mouth daily        citalopram 20 MG tablet    celeXA    90 tablet    TAKE 1 TABLET (20 MG) BY MOUTH DAILY    Excessive anger       continuous blood glucose monitoring sensor     3 each    For use with Freestyle Nina Flash  for continuous monitioring of blood glucose levels. Replace sensor every 10 days.    Type 2 diabetes mellitus with diabetic neuropathy, with long-term current use of insulin (H)       ferrous sulfate 325 (65 Fe) MG tablet    IRON     Take 1 tablet by mouth daily (with breakfast).        finasteride 5 MG tablet    PROSCAR    90 tablet    Take 1 tablet (5 mg) by mouth daily    Benign prostatic hyperplasia with urinary hesitancy       FREESTYLE NINA READER Georgette     1 Device    1 kit as needed    Type 2 diabetes mellitus with diabetic neuropathy, with long-term current use of insulin (H)       glipiZIDE 10 MG 24 hr tablet    GLUCOTROL XL    180 tablet    Take 2 tablets (20 mg) by mouth every morning    Type 2 diabetes mellitus with diabetic neuropathy, without long-term current use of  insulin (H)       hydrocortisone 0.2 % cream    WESTCORT    45 g    Apply topically 2 times daily as needed Apply sparingly to affected area, BID.    Dermatitis       * insulin glargine 100 UNIT/ML injection    LANTUS SOLOSTAR    45 mL    26 units qd.  Increase as directed to max dose of 45 units qd.    Type 2 diabetes mellitus with diabetic neuropathy, with long-term current use of insulin (H)       * insulin glargine 100 UNIT/ML injection    LANTUS SOLOSTAR    15 mL    Inject 26 Units Subcutaneous daily    Type 2 diabetes mellitus with diabetic neuropathy, with long-term current use of insulin (H)       insulin pen needle 31G X 5 MM    B-D U/F    100 each    Use 1 daily or as directed.    Type 2 diabetes mellitus with diabetic neuropathy, unspecified long term insulin use status       LEVOXYL 137 MCG tablet   Generic drug:  levothyroxine     30 tablet    Take 1 tablet (137 mcg) by mouth daily    Type 2 diabetes mellitus with diabetic neuropathy, with long-term current use of insulin (H), Hypothyroidism due to acquired atrophy of thyroid, Need for prophylactic vaccination and inoculation against influenza, Type 2 diabetes mellitus with diabetic neuropathy, without long-term current use of insulin (H), Dermatitis       losartan 50 MG tablet    COZAAR    90 tablet    TAKE 1 TABLET DAILY    Essential hypertension with goal blood pressure less than 140/90       metFORMIN 1000 MG tablet    GLUCOPHAGE    180 tablet    TAKE 1 TABLET (1,000 MG) BY MOUTH 2 TIMES DAILY (WITH MEALS)    Type 2 diabetes mellitus with diabetic neuropathy, without long-term current use of insulin (H)       Multi-vitamin Tabs tablet   Generic drug:  multivitamin, therapeutic with minerals     30    1 TABLET DAILY        omeprazole-sodium bicarbonate  MG per capsule    ZEGERID     Take 1 capsule by mouth every morning (before breakfast)        order for DME     1 Units    Equipment being ordered: single cane with rubber foot    Type 2  diabetes, HbA1C goal < 8% (H), Neuropathy in diabetes (H), Unsteady gait       order for DME     1 Units    Equipment being ordered: four pronged cane    Unsteady gait, Neuropathy in diabetes (H), Type 2 diabetes, HbA1C goal < 8% (H)       simvastatin 20 MG tablet    ZOCOR    90 tablet    Take 1 tablet (20 mg) by mouth At Bedtime    Hyperlipidemia LDL goal <100       UNABLE TO FIND      MEDICATION NAME: Prednisolone Acetate, Gatifloxacin and Bromfenac 1/0.5/0.075% - instill one drop in the surgery eye three times daily beginning 2 days before surgery        VITAMIN B 12 PO      Take 250 mcg by mouth daily    Excessive anger       VITAMIN D (CHOLECALCIFEROL) PO      Take 1,000 Units by mouth daily.        * Notice:  This list has 6 medication(s) that are the same as other medications prescribed for you. Read the directions carefully, and ask your doctor or other care provider to review them with you.

## 2018-10-18 DIAGNOSIS — L30.9 DERMATITIS: ICD-10-CM

## 2018-10-18 DIAGNOSIS — E11.40 TYPE 2 DIABETES MELLITUS WITH DIABETIC NEUROPATHY, WITH LONG-TERM CURRENT USE OF INSULIN (H): ICD-10-CM

## 2018-10-18 DIAGNOSIS — E11.40 TYPE 2 DIABETES MELLITUS WITH DIABETIC NEUROPATHY, WITHOUT LONG-TERM CURRENT USE OF INSULIN (H): ICD-10-CM

## 2018-10-18 DIAGNOSIS — Z23 NEED FOR PROPHYLACTIC VACCINATION AND INOCULATION AGAINST INFLUENZA: ICD-10-CM

## 2018-10-18 DIAGNOSIS — E03.4 HYPOTHYROIDISM DUE TO ACQUIRED ATROPHY OF THYROID: ICD-10-CM

## 2018-10-18 DIAGNOSIS — Z79.4 TYPE 2 DIABETES MELLITUS WITH DIABETIC NEUROPATHY, WITH LONG-TERM CURRENT USE OF INSULIN (H): ICD-10-CM

## 2018-10-18 NOTE — TELEPHONE ENCOUNTER
"Requested Prescriptions   Pending Prescriptions Disp Refills     LEVOXYL 137 MCG tablet    Last Written Prescription Date:  9/13/18  Last Fill Quantity: 30 tablets,  # refills: 1   Last office visit: 10/16/2018 with prescribing provider:  Jw   Future Office Visit:   Next 5 appointments (look out 90 days)     Dec 14, 2018  9:30 AM CST   [facility]    [address]  [facility] [address]   [phone]                  30 tablet 1     Sig: Take 1 tablet (137 mcg) by mouth daily    Thyroid Protocol Failed    10/18/2018  3:49 PM       Failed - Normal TSH on file in past 12 months    Recent Labs   Lab Test  09/07/18   1031   TSH  0.06*             Passed - Patient is 12 years or older       Passed - Recent (12 mo) or future (30 days) visit within the authorizing provider's specialty    Patient had office visit in the last 12 months or has a visit in the next 30 days with authorizing provider or within the authorizing provider's specialty.  See \"Patient Info\" tab in inbasket, or \"Choose Columns\" in Meds & Orders section of the refill encounter.                "

## 2018-10-19 DIAGNOSIS — E11.40 TYPE 2 DIABETES MELLITUS WITH DIABETIC NEUROPATHY, WITH LONG-TERM CURRENT USE OF INSULIN (H): ICD-10-CM

## 2018-10-19 DIAGNOSIS — E03.4 HYPOTHYROIDISM DUE TO ACQUIRED ATROPHY OF THYROID: ICD-10-CM

## 2018-10-19 DIAGNOSIS — Z23 NEED FOR PROPHYLACTIC VACCINATION AND INOCULATION AGAINST INFLUENZA: ICD-10-CM

## 2018-10-19 DIAGNOSIS — E11.40 TYPE 2 DIABETES MELLITUS WITH DIABETIC NEUROPATHY, WITHOUT LONG-TERM CURRENT USE OF INSULIN (H): ICD-10-CM

## 2018-10-19 DIAGNOSIS — Z79.4 TYPE 2 DIABETES MELLITUS WITH DIABETIC NEUROPATHY, WITH LONG-TERM CURRENT USE OF INSULIN (H): ICD-10-CM

## 2018-10-19 DIAGNOSIS — L30.9 DERMATITIS: ICD-10-CM

## 2018-10-19 LAB
T3FREE SERPL-MCNC: 2.5 PG/ML (ref 2.3–4.2)
T4 FREE SERPL-MCNC: 1.46 NG/DL (ref 0.76–1.46)
TSH SERPL DL<=0.005 MIU/L-ACNC: 0.3 MU/L (ref 0.4–4)

## 2018-10-19 PROCEDURE — 84445 ASSAY OF TSI GLOBULIN: CPT | Mod: 90 | Performed by: CLINICAL NURSE SPECIALIST

## 2018-10-19 PROCEDURE — 84481 FREE ASSAY (FT-3): CPT | Performed by: CLINICAL NURSE SPECIALIST

## 2018-10-19 PROCEDURE — 86800 THYROGLOBULIN ANTIBODY: CPT | Performed by: CLINICAL NURSE SPECIALIST

## 2018-10-19 PROCEDURE — 84439 ASSAY OF FREE THYROXINE: CPT | Performed by: CLINICAL NURSE SPECIALIST

## 2018-10-19 PROCEDURE — 99000 SPECIMEN HANDLING OFFICE-LAB: CPT | Performed by: CLINICAL NURSE SPECIALIST

## 2018-10-19 PROCEDURE — 84443 ASSAY THYROID STIM HORMONE: CPT | Performed by: CLINICAL NURSE SPECIALIST

## 2018-10-19 PROCEDURE — 36415 COLL VENOUS BLD VENIPUNCTURE: CPT | Performed by: CLINICAL NURSE SPECIALIST

## 2018-10-19 PROCEDURE — 86376 MICROSOMAL ANTIBODY EACH: CPT | Performed by: CLINICAL NURSE SPECIALIST

## 2018-10-19 RX ORDER — LEVOTHYROXINE SODIUM 137 UG/1
137 TABLET ORAL DAILY
Status: SHIPPED
Start: 2018-10-19 | End: 2018-10-29 | Stop reason: DRUGHIGH

## 2018-10-19 NOTE — TELEPHONE ENCOUNTER
Had dose change.  Due for lab recheck.  Called patient.  Scheduled for 10:45 am today.  Does have one refill available at pharmacy.  States has enough til labs back to fill as costs about $60 and does not want to fill and then have dose changed.  Note to pharmacy.  Daisy Bradley RN    Entered by Diana Butler APRN CNP at 9/13/2018  4:21 AM     Caio,   Your thyroid levels are still too high.   I am reducing your Levoxyl dose to 137 mcg/day   Please make a lab appointment to recheck thyroid levels in another 6 weeks.   Diana Butler NP   Endocrinology

## 2018-10-19 NOTE — TELEPHONE ENCOUNTER
Will postpone til Monday to check for lab results and refill/change as needed.  Daisy Bradley RN

## 2018-10-22 LAB
THYROGLOB AB SERPL IA-ACNC: <20 IU/ML (ref 0–40)
THYROPEROXIDASE AB SERPL-ACNC: 11 IU/ML

## 2018-10-24 LAB — TSI SER-ACNC: <1 TSI INDEX

## 2018-10-29 RX ORDER — LEVOTHYROXINE SODIUM 125 UG/1
125 TABLET ORAL DAILY
Qty: 30 TABLET | Refills: 1 | Status: SHIPPED | OUTPATIENT
Start: 2018-10-29 | End: 2018-12-20

## 2018-10-29 NOTE — PROGRESS NOTES
Please call  Caio,  Your thyroid levels are improving but are still slightly too high.  I am decreasing your levothyroxine to 125 mcg/day.  I think your levels should be in normal range with this dose change.  We'll recheck labs again in December at your next follow up visit.  Diana Butler NP  Endocrinology

## 2018-10-31 DIAGNOSIS — R39.11 BENIGN PROSTATIC HYPERPLASIA WITH URINARY HESITANCY: ICD-10-CM

## 2018-10-31 DIAGNOSIS — N40.1 BENIGN PROSTATIC HYPERPLASIA WITH URINARY HESITANCY: ICD-10-CM

## 2018-10-31 NOTE — TELEPHONE ENCOUNTER
"Requested Prescriptions   Pending Prescriptions Disp Refills     finasteride (PROSCAR) 5 MG tablet    Last Written Prescription Date:  1/8/18  Last Fill Quantity: 90 tablet,  # refills: 3   Last office visit: 10/16/2018 with prescribing provider:  Jw   Future Office Visit:   Next 5 appointments (look out 90 days)     Dec 14, 2018  9:30 AM CST   Return Visit with BRIDGET Payne CNP   [facility]    [address]  [facility] [address] [phone]                  90 tablet 3     Sig: Take 1 tablet (5 mg) by mouth daily    Alpha Blockers Passed    10/31/2018  8:22 AM       Passed - Blood pressure under 140/90 in past 12 months    BP Readings from Last 3 Encounters:   10/16/18 136/80   09/18/18 130/84   09/07/18 126/80                Passed - Recent (12 mo) or future (30 days) visit within the authorizing provider's specialty    Patient had office visit in the last 12 months or has a visit in the next 30 days with authorizing provider or within the authorizing provider's specialty.  See \"Patient Info\" tab in inbasket, or \"Choose Columns\" in Meds & Orders section of the refill encounter.             Passed - Patient does not have Tadalafil, Vardenafil, or Sildenafil on their medication list       Passed - Patient is 18 years of age or older          "

## 2018-11-01 RX ORDER — FINASTERIDE 5 MG/1
5 TABLET, FILM COATED ORAL DAILY
Qty: 90 TABLET | Refills: 0 | Status: SHIPPED | OUTPATIENT
Start: 2018-11-01 | End: 2019-04-17

## 2018-11-01 NOTE — TELEPHONE ENCOUNTER
Prescription approved per [facility] Refill Protocol.    Haylie George RN -- [facility] Workforce

## 2018-11-19 ENCOUNTER — OFFICE VISIT (OUTPATIENT)
Dept: FAMILY MEDICINE | Facility: CLINIC | Age: 58
End: 2018-11-19
Payer: COMMERCIAL

## 2018-11-19 ENCOUNTER — RADIANT APPOINTMENT (OUTPATIENT)
Dept: GENERAL RADIOLOGY | Facility: CLINIC | Age: 58
End: 2018-11-19
Attending: FAMILY MEDICINE
Payer: COMMERCIAL

## 2018-11-19 VITALS
OXYGEN SATURATION: 99 % | SYSTOLIC BLOOD PRESSURE: 122 MMHG | BODY MASS INDEX: 39.49 KG/M2 | WEIGHT: 237 LBS | TEMPERATURE: 97.7 F | HEART RATE: 72 BPM | RESPIRATION RATE: 16 BRPM | DIASTOLIC BLOOD PRESSURE: 76 MMHG | HEIGHT: 65 IN

## 2018-11-19 DIAGNOSIS — E11.40 TYPE 2 DIABETES MELLITUS WITH DIABETIC NEUROPATHY, WITHOUT LONG-TERM CURRENT USE OF INSULIN (H): ICD-10-CM

## 2018-11-19 DIAGNOSIS — E03.4 HYPOTHYROIDISM DUE TO ACQUIRED ATROPHY OF THYROID: ICD-10-CM

## 2018-11-19 DIAGNOSIS — S69.92XA INJURY OF LEFT WRIST, INITIAL ENCOUNTER: ICD-10-CM

## 2018-11-19 DIAGNOSIS — S69.92XA INJURY OF LEFT WRIST, INITIAL ENCOUNTER: Primary | ICD-10-CM

## 2018-11-19 DIAGNOSIS — I10 ESSENTIAL HYPERTENSION WITH GOAL BLOOD PRESSURE LESS THAN 140/90: ICD-10-CM

## 2018-11-19 DIAGNOSIS — E78.5 HYPERLIPIDEMIA LDL GOAL <100: ICD-10-CM

## 2018-11-19 PROCEDURE — 73110 X-RAY EXAM OF WRIST: CPT | Mod: LT

## 2018-11-19 PROCEDURE — 99214 OFFICE O/P EST MOD 30 MIN: CPT | Performed by: FAMILY MEDICINE

## 2018-11-19 RX ORDER — ATENOLOL 25 MG/1
25 TABLET ORAL DAILY
Qty: 90 TABLET | Refills: 0 | Status: SHIPPED | OUTPATIENT
Start: 2018-11-19 | End: 2019-10-24

## 2018-11-19 RX ORDER — LEVOTHYROXINE SODIUM 125 UG/1
125 TABLET ORAL DAILY
Qty: 30 TABLET | Refills: 1 | Status: CANCELLED | OUTPATIENT
Start: 2018-11-19

## 2018-11-19 RX ORDER — LOSARTAN POTASSIUM 50 MG/1
50 TABLET ORAL DAILY
Qty: 90 TABLET | Refills: 3 | Status: SHIPPED | OUTPATIENT
Start: 2018-11-19 | End: 2019-10-24

## 2018-11-19 RX ORDER — SIMVASTATIN 20 MG
20 TABLET ORAL AT BEDTIME
Qty: 90 TABLET | Refills: 3 | Status: SHIPPED | OUTPATIENT
Start: 2018-11-19 | End: 2019-10-24

## 2018-11-19 RX ORDER — GLIPIZIDE 10 MG/1
20 TABLET, FILM COATED, EXTENDED RELEASE ORAL EVERY MORNING
Qty: 180 TABLET | Refills: 1 | Status: CANCELLED | OUTPATIENT
Start: 2018-11-19

## 2018-11-19 ASSESSMENT — ANXIETY QUESTIONNAIRES
5. BEING SO RESTLESS THAT IT IS HARD TO SIT STILL: NOT AT ALL
IF YOU CHECKED OFF ANY PROBLEMS ON THIS QUESTIONNAIRE, HOW DIFFICULT HAVE THESE PROBLEMS MADE IT FOR YOU TO DO YOUR WORK, TAKE CARE OF THINGS AT HOME, OR GET ALONG WITH OTHER PEOPLE: SOMEWHAT DIFFICULT
7. FEELING AFRAID AS IF SOMETHING AWFUL MIGHT HAPPEN: SEVERAL DAYS
2. NOT BEING ABLE TO STOP OR CONTROL WORRYING: NOT AT ALL
6. BECOMING EASILY ANNOYED OR IRRITABLE: MORE THAN HALF THE DAYS
GAD7 TOTAL SCORE: 3
3. WORRYING TOO MUCH ABOUT DIFFERENT THINGS: NOT AT ALL
1. FEELING NERVOUS, ANXIOUS, OR ON EDGE: NOT AT ALL

## 2018-11-19 ASSESSMENT — PATIENT HEALTH QUESTIONNAIRE - PHQ9
5. POOR APPETITE OR OVEREATING: NOT AT ALL
SUM OF ALL RESPONSES TO PHQ QUESTIONS 1-9: 6

## 2018-11-19 NOTE — PROGRESS NOTES
SUBJECTIVE:   Steve Morgan is a 58 year old male who presents to clinic today for the following health issues:      Joint Pain    Onset: 1 month    Description:   Location: left wrist  Character: Stabbing sharp pain in the ulnar part of the hand.    Intensity: mild    Progression of Symptoms: same    Accompanying Signs & Symptoms:  Other symptoms: none    History:   Previous similar pain: no       Precipitating factors:   Trauma or overuse: YES- fell and landed on the out stretched hand as he fall back holding a bag of leaves.    Alleviating factors:  Improved by: nothing    Therapies Tried and outcome: none.            Problem list and histories reviewed & adjusted, as indicated.  Additional history: as documented    Patient Active Problem List   Diagnosis     Anemia     Morbid obesity due to excess calories (H)     Sleep apnea     Neuropathy in diabetes (H)     Hypothyroidism     HYPERLIPIDEMIA LDL GOAL <100     Hypertension goal BP (blood pressure) < 140/90     Peripheral arterial disease (H)     Tremor     Peripheral neuropathy     Excessive anger     Generalized muscle weakness     Health Care Home     Unsteady gait     DAMIÁN (obstructive sleep apnea)     Vitamin B12 deficiency without anemia     Hyponatremia     Chronic hyponatremia     Type 2 diabetes mellitus with diabetic neuropathy (H)     Depression     Cellulitis and abscess of trunk     Benign prostatic hyperplasia with urinary hesitancy     Past Surgical History:   Procedure Laterality Date     COLONOSCOPY       GENITOURINARY SURGERY      surg for undescended testicle     REPAIR HAMMER TOE BILATERAL  5/16/2013    Procedure: REPAIR HAMMER TOE BILATERAL;  Flexor Tenotomy Toes 2,3,4,5 Bilateral Feet;  Surgeon: Saad Bangura DPM;  Location:  OR       Social History   Substance Use Topics     Smoking status: Never Smoker     Smokeless tobacco: Never Used     Alcohol use Yes      Comment: occ     Family History   Problem Relation Age of Onset      "Breast Cancer Mother      Hypertension Mother      Thyroid Disease Mother      Depression Mother      Cancer - colorectal Father      Thyroid Disease Father      Depression Father      HEART DISEASE Maternal Grandmother      CHF     Circulatory Paternal Grandmother      Cancer Paternal Grandfather      Diabetes Brother      Diabetes Brother            Reviewed and updated as needed this visit by clinical staff  Tobacco  Allergies  Med Hx  Surg Hx  Fam Hx  Soc Hx      Reviewed and updated as needed this visit by Provider         ROS:      OBJECTIVE:     /76 (BP Location: Right arm, Patient Position: Chair, Cuff Size: Adult Large)  Pulse 72  Temp 97.7  F (36.5  C) (Oral)  Resp 16  Ht 5' 5\" (1.651 m)  Wt 237 lb (107.5 kg)  SpO2 99%  BMI 39.44 kg/m2  Body mass index is 39.44 kg/(m^2).    Wrist exam  Wrist exam: inspection : wasting of the hand muscles and tremor of the hand.  ROM of the wrist : normal  Palpation of the wrist : mild  tenderness on the dorsal part of the ulnar process and ulnar part of the wrist.  scaphoid (snuffbox) tenderness negative   strength :normal   radial pulse normal, sensation normal of the hand.  Motor exam of the radial nerve normal, ulnar unable to open the fingers and median normal.  Tinel and Phalen tests not done.  , normal contralateral hand and wrist.      Xray of the wrist is negative.    ASSESSMENT/PLAN:             1. Essential hypertension with goal blood pressure less than 140/90  Refill given for meds. BP is controlled.  - losartan (COZAAR) 50 MG tablet; Take 1 tablet (50 mg) by mouth daily  Dispense: 90 tablet; Refill: 3  - atenolol (TENORMIN) 25 MG tablet; Take 1 tablet (25 mg) by mouth daily  Dispense: 90 tablet; Refill: 0    2. Hypothyroidism due to acquired atrophy of thyroid  Pt to follow up with Diana mancilla.    3. Hyperlipidemia LDL goal <100  Refill medications.  - simvastatin (ZOCOR) 20 MG tablet; Take 1 tablet (20 mg) by mouth At Bedtime  Dispense: " 90 tablet; Refill: 3    4. Type 2 diabetes mellitus with diabetic neuropathy, without long-term current use of insulin (H)      5. Injury of left wrist, initial encounter  Mostly strain, motrin as needed, wrist splint, I recommend, resting the affected joint, elevation, use Ice on the joint for 15 minutes 4 times a day, and may use OTC medicine for pain management.  - XR Wrist Left G/E 3 Views; Future  - order for DME; Equipment being ordered: Lt wrist splint  Dispense: 1 Device; Refill: 0  Pt has advanced tremor and distal nerve problem, pt to follow up with his neurologist.        Jessica Fowler MD  [facility]

## 2018-11-19 NOTE — MR AVS SNAPSHOT
After Visit Summary   11/19/2018    Steve Morgan    MRN: [id number]           Patient Information     Date Of Birth          1960        Visit Information        Provider Department      11/19/2018 11:30 AM Jessica Fowler MD [facility]        Today's Diagnoses     Injury of left wrist, initial encounter    -  1    Essential hypertension with goal blood pressure less than 140/90        Hypothyroidism due to acquired atrophy of thyroid        Hyperlipidemia LDL goal <100        Type 2 diabetes mellitus with diabetic neuropathy, without long-term current use of insulin (H)           Follow-ups after your visit        Follow-up notes from your care team     Return in about 4 weeks (around 12/17/2018), or if symptoms worsen or fail to improve.      Your next 10 appointments already scheduled     Dec 14, 2018  9:30 AM CST   Return Visit with BRIDGET Payne CNP   [facility] ([address] [address] [phone]            Apr 16, 2019  9:30 AM CDT   Travel Injection with PRABHA BAJWA/LPN   [facility] ([facility])    [address] [place] [address] [phone]              Who to contact     If you have questions or need follow up information about today's clinic visit or your schedule please contact [facility] directly at [phone].  Normal or non-critical lab and imaging results will be communicated to you by MyChart, letter or phone within 4 business days after the clinic has received the results. If you do not hear from us within 7 days, please contact the clinic through MyChart or phone. If you have a critical or abnormal lab result, we will notify you by phone as soon as possible.  Submit refill requests through [employer] or call your pharmacy and they will forward the refill request to us. Please allow 3 business days for  "your refill to be completed.          Additional Information About Your Visit        [employer] Information     [employer] gives you secure access to your electronic health record. If you see a primary care provider, you can also send messages to your care team and make appointments. If you have questions, please call your primary care clinic.  If you do not have a primary care provider, please call [phone] and they will assist you.        Care EveryWhere ID     This is your Care EveryWhere ID. This could be used by other organizations to access your [facility] medical records  [id number]        Your Vitals Were     Pulse Temperature Respirations Height Pulse Oximetry BMI (Body Mass Index)    72 97.7  F (36.5  C) (Oral) 16 5' 5\" (1.651 m) 99% 39.44 kg/m2       Blood Pressure from Last 3 Encounters:   11/19/18 122/76   10/16/18 136/80   09/18/18 130/84    Weight from Last 3 Encounters:   11/19/18 237 lb (107.5 kg)   10/16/18 240 lb (108.9 kg)   09/18/18 232 lb (105.2 kg)              We Performed the Following     DEPRESSION ACTION PLAN (DAP)          Today's Medication Changes          These changes are accurate as of 11/19/18 12:21 PM.  If you have any questions, ask your nurse or doctor.               Start taking these medicines.        Dose/Directions    order for DME   Used for:  Injury of left wrist, initial encounter   Started by:  Jessica Fowler MD        Equipment being ordered: Lt wrist splint   Quantity:  1 Device   Refills:  0         These medicines have changed or have updated prescriptions.        Dose/Directions    atenolol 25 MG tablet   Commonly known as:  TENORMIN   This may have changed:  See the new instructions.   Used for:  Essential hypertension with goal blood pressure less than 140/90   Changed by:  Jessica Fowler MD        Dose:  25 mg   Take 1 tablet (25 mg) by mouth daily   Quantity:  90 tablet   Refills:  0       losartan 50 MG tablet   Commonly known as:  COZAAR   This may have changed:  See " the new instructions.   Used for:  Essential hypertension with goal blood pressure less than 140/90   Changed by:  Jessica Fowler MD        Dose:  50 mg   Take 1 tablet (50 mg) by mouth daily   Quantity:  90 tablet   Refills:  3            Where to get your medicines      These medications were sent to [facility]/pharmacy #[id number] - [place], [place] - [address] [address].  [address] [address] [facility] [address]     Phone:  [phone]     atenolol 25 MG tablet    losartan 50 MG tablet    simvastatin 20 MG tablet         Some of these will need a paper prescription and others can be bought over the counter.  Ask your nurse if you have questions.     Bring a paper prescription for each of these medications     order for DME                Primary Care Provider Office Phone # Fax #    Lisa Pierre PA-C [phone] [address] [facility] [address]        Equal Access to Services     [facility] : Hadii aad ku hadasho Solisadnra, waaxda luqadaha, qaybta kaalmada adeegyada, jayson jackson hayshirley gimenez . So [facility] [phone].    ATENCIÓN: Si habla español, tiene a adler disposición servicios gratuitos de asistencia lingüística. Llame al [phone].    We comply with applicable federal civil rights laws and [place] laws. We do not discriminate on the basis of race, color, national origin, age, disability, sex, sexual orientation, or gender identity.            Thank you!     Thank you for choosing [facility]  for your care. Our goal is always to provide you with excellent care. Hearing back from our patients is one way we can continue to improve our services. Please take a few minutes to complete the written survey that you may receive in the mail after your visit with us. Thank you!             Your Updated Medication List - Protect others around you: Learn how to safely use, store and throw away your medicines at www.disposemymeds.org.          This list is accurate as of 11/19/18  12:21 PM.  Always use your most recent med list.                   Brand Name Dispense Instructions for use Diagnosis    ammonium lactate 12 % cream    LAC-HYDRIN    385 g    Apply topically 2 times daily as needed for dry skin    Diabetic polyneuropathy associated with type 2 diabetes mellitus (H), Pre-ulcerative calluses, Pes planus of both feet       aspirin 81 MG tablet     100    ONE DAILY        atenolol 25 MG tablet    TENORMIN    90 tablet    Take 1 tablet (25 mg) by mouth daily    Essential hypertension with goal blood pressure less than 140/90       * blood glucose monitoring lancets     1 Box    Use to test blood sugars 2 times daily or as directed.    Type 2 diabetes, HbA1C goal < 8% (H)       * blood glucose monitoring lancets     100 each    Use to test blood sugar 3 times daily or as directed.    Type 2 diabetes mellitus with diabetic neuropathy, unspecified long term insulin use status       * blood glucose monitoring test strip    ONETOUCH VERIO IQ    100 strip    Use to test blood sugars 2 times daily or as directed.    Type 2 diabetes, HbA1C goal < 8% (H)       * blood glucose monitoring test strip    KERLINE CONTOUR NEXT    100 strip    Use to test blood sugar 3 times daily or as directed.    Type 2 diabetes mellitus with diabetic neuropathy, unspecified long term insulin use status       buPROPion 300 MG 24 hr tablet    WELLBUTRIN XL    90 tablet    Take 1 tablet (300 mg) by mouth every morning    Excessive anger       CALCIUM 600 + D PO      Take 1 tablet by mouth daily        citalopram 20 MG tablet    celeXA    90 tablet    TAKE 1 TABLET (20 MG) BY MOUTH DAILY    Excessive anger       continuous blood glucose monitoring sensor     3 each    For use with Freestyle Nina Flash  for continuous monitioring of blood glucose levels. Replace sensor every 10 days.    Type 2 diabetes mellitus with diabetic neuropathy, with long-term current use of insulin (H)       ferrous sulfate 325 (65 Fe) MG  tablet    IRON     Take 1 tablet by mouth daily (with breakfast).        finasteride 5 MG tablet    PROSCAR    90 tablet    Take 1 tablet (5 mg) by mouth daily    Benign prostatic hyperplasia with urinary hesitancy       FREESTYLE GIULIANA READER Georgette     1 Device    1 kit as needed    Type 2 diabetes mellitus with diabetic neuropathy, with long-term current use of insulin (H)       glipiZIDE 10 MG 24 hr tablet    GLUCOTROL XL    180 tablet    Take 2 tablets (20 mg) by mouth every morning    Type 2 diabetes mellitus with diabetic neuropathy, without long-term current use of insulin (H)       hydrocortisone 0.2 % cream    WESTCORT    45 g    Apply topically 2 times daily as needed Apply sparingly to affected area, BID.    Dermatitis       * insulin glargine 100 UNIT/ML injection    LANTUS SOLOSTAR    45 mL    26 units qd.  Increase as directed to max dose of 45 units qd.    Type 2 diabetes mellitus with diabetic neuropathy, with long-term current use of insulin (H)       * insulin glargine 100 UNIT/ML injection    LANTUS SOLOSTAR    15 mL    Inject 26 Units Subcutaneous daily    Type 2 diabetes mellitus with diabetic neuropathy, with long-term current use of insulin (H)       insulin pen needle 31G X 5 MM miscellaneous    B-D U/F    100 each    Use 1 daily or as directed.    Type 2 diabetes mellitus with diabetic neuropathy, unspecified long term insulin use status       levothyroxine 125 MCG tablet    LEVOXYL    30 tablet    Take 1 tablet (125 mcg) by mouth daily Dispense name brand Levoxyl, no generics    Hypothyroidism due to acquired atrophy of thyroid       losartan 50 MG tablet    COZAAR    90 tablet    Take 1 tablet (50 mg) by mouth daily    Essential hypertension with goal blood pressure less than 140/90       metFORMIN 1000 MG tablet    GLUCOPHAGE    180 tablet    TAKE 1 TABLET (1,000 MG) BY MOUTH 2 TIMES DAILY (WITH MEALS)    Type 2 diabetes mellitus with diabetic neuropathy, without long-term current use of  insulin (H)       Multi-vitamin Tabs tablet   Generic drug:  multivitamin, therapeutic with minerals     30    1 TABLET DAILY        omeprazole-sodium bicarbonate  MG per capsule    ZEGERID     Take 1 capsule by mouth every morning (before breakfast)        order for DME     1 Units    Equipment being ordered: single cane with rubber foot    Type 2 diabetes, HbA1C goal < 8% (H), Neuropathy in diabetes (H), Unsteady gait       order for DME     1 Units    Equipment being ordered: four pronged cane    Unsteady gait, Neuropathy in diabetes (H), Type 2 diabetes, HbA1C goal < 8% (H)       order for DME     1 Device    Equipment being ordered: Lt wrist splint    Injury of left wrist, initial encounter       simvastatin 20 MG tablet    ZOCOR    90 tablet    Take 1 tablet (20 mg) by mouth At Bedtime    Hyperlipidemia LDL goal <100       UNABLE TO FIND      MEDICATION NAME: Prednisolone Acetate, Gatifloxacin and Bromfenac 1/0.5/0.075% - instill one drop in the surgery eye three times daily beginning 2 days before surgery        VITAMIN B 12 PO      Take 250 mcg by mouth daily    Excessive anger       VITAMIN D (CHOLECALCIFEROL) PO      Take 1,000 Units by mouth daily.        * Notice:  This list has 6 medication(s) that are the same as other medications prescribed for you. Read the directions carefully, and ask your doctor or other care provider to review them with you.

## 2018-11-19 NOTE — LETTER
My Depression Action Plan  Name: Steve Morgan   Date of Birth 1960  Date: 11/19/2018    My doctor: Lisa Pierre   My clinic: [address] [address] [phone]          GREEN    ZONE   Good Control    What it looks like:     Things are going generally well. You have normal up s and down s. You may even feel depressed from time to time, but bad moods usually last less than a day.   What you need to do:  1. Continue to care for yourself (see self care plan)  2. Check your depression survival kit and update it as needed  3. Follow your physician s recommendations including any medication.  4. Do not stop taking medication unless you consult with your physician first.           YELLOW         ZONE Getting Worse    What it looks like:     Depression is starting to interfere with your life.     It may be hard to get out of bed; you may be starting to isolate yourself from others.    Symptoms of depression are starting to last most all day and this has happened for several days.     You may have suicidal thoughts but they are not constant.   What you need to do:     1. Call your care team, your response to treatment will improve if you keep your care team informed of your progress. Yellow periods are signs an adjustment may need to be made.     2. Continue your self-care, even if you have to fake it!    3. Talk to someone in your support network    4. Open up your depression survival kit           RED    ZONE Medical Alert - Get Help    What it looks like:     Depression is seriously interfering with your life.     You may experience these or other symptoms: You can t get out of bed most days, can t work or engage in other necessary activities, you have trouble taking care of basic hygiene, or basic responsibilities, thoughts of suicide or death that will not go away, self-injurious behavior.     What you need to do:  1. Call your care  team and request a same-day appointment. If they are not available (weekends or after hours) call your local crisis line, emergency room or 911.            Depression Self Care Plan / Survival Kit    Self-Care for Depression  Here s the deal. Your body and mind are really not as separate as most people think.  What you do and think affects how you feel and how you feel influences what you do and think. This means if you do things that people who feel good do, it will help you feel better.  Sometimes this is all it takes.  There is also a place for medication and therapy depending on how severe your depression is, so be sure to consult with your medical provider and/ or Behavioral Health Consultant if your symptoms are worsening or not improving.     In order to better manage my stress, I will:    Exercise  Get some form of exercise, every day. This will help reduce pain and release endorphins, the  feel good  chemicals in your brain. This is almost as good as taking antidepressants!  This is not the same as joining a gym and then never going! (they count on that by the way ) It can be as simple as just going for a walk or doing some gardening, anything that will get you moving.      Hygiene   Maintain good hygiene (Get out of bed in the morning, Make your bed, Brush your teeth, Take a shower, and Get dressed like you were going to work, even if you are unemployed).  If your clothes don't fit try to get ones that do.    Diet  I will strive to eat foods that are good for me, drink plenty of water, and avoid excessive sugar, caffeine, alcohol, and other mood-altering substances.  Some foods that are helpful in depression are: complex carbohydrates, B vitamins, flaxseed, fish or fish oil, fresh fruits and vegetables.    Psychotherapy  I agree to participate in Individual Therapy (if recommended).    Medication  If prescribed medications, I agree to take them.  Missing doses can result in serious side effects.  I  understand that drinking alcohol, or other illicit drug use, may cause potential side effects.  I will not stop my medication abruptly without first discussing it with my provider.    Staying Connected With Others  I will stay in touch with my friends, family members, and my primary care provider/team.    Use your imagination  Be creative.  We all have a creative side; it doesn t matter if it s oil painting, sand castles, or mud pies! This will also kick up the endorphins.    Witness Beauty  (AKA stop and smell the roses) Take a look outside, even in mid-winter. Notice colors, textures. Watch the squirrels and birds.     Service to others  Be of service to others.  There is always someone else in need.  By helping others we can  get out of ourselves  and remember the really important things.  This also provides opportunities for practicing all the other parts of the program.    Humor  Laugh and be silly!  Adjust your TV habits for less news and crime-drama and more comedy.    Control your stress  Try breathing deep, massage therapy, biofeedback, and meditation. Find time to relax each day.     My support system    Clinic Contact:  Phone number:    Contact 1:  Phone number:    Contact 2:  Phone number:    Judaism/:  Phone number:    Therapist:  Phone number:    Local crisis center:    Phone number:    Other community support:  Phone number:

## 2018-11-20 ENCOUNTER — TELEPHONE (OUTPATIENT)
Dept: FAMILY MEDICINE | Facility: CLINIC | Age: 58
End: 2018-11-20

## 2018-11-20 ASSESSMENT — ANXIETY QUESTIONNAIRES: GAD7 TOTAL SCORE: 3

## 2018-11-20 NOTE — TELEPHONE ENCOUNTER
Clinic Action Needed:Yes, please return call  Reason for Call: Caio returned call from clinic, gave Caio message from Dr. Fowler regarding possible chip fracture of a bone in wrist.  Caio does appear to understand directives to continue to wear same splint for 2 weeks and then wear PRN for pain.  Caio is wondering if Dr. Fowler wants any follow up visit after the two weeks.  Please return call to discuss further.  Thank you.     Routed to: PRABHA Haro RN  [facility]

## 2018-11-20 NOTE — TELEPHONE ENCOUNTER
Please call Caio:  His Xray is showing possible chip fracture of one of the bones of the wrist, called triquetrum    I spoke with orthopedic, who recommend to continue on using the same splint for 2 weeks, then after than use it as needed when the pain occur.  Jessica Fowler MD  [facility]  [phone]  r

## 2018-11-21 NOTE — TELEPHONE ENCOUNTER
No, no need for follow up unless the pain did not go away.  Jessica Fowler MD  [facility]  [phone]

## 2018-11-21 NOTE — TELEPHONE ENCOUNTER
Patient calling and concerned bone chip will move and go to his heart.  Discussed bone chip would not travel like for instance a blood clot.  Patient has no further questions at this time.  Daisy Bradley RN

## 2018-11-27 ENCOUNTER — RADIANT APPOINTMENT (OUTPATIENT)
Dept: GENERAL RADIOLOGY | Facility: CLINIC | Age: 58
End: 2018-11-27
Attending: PODIATRIST
Payer: COMMERCIAL

## 2018-11-27 ENCOUNTER — OFFICE VISIT (OUTPATIENT)
Dept: PODIATRY | Facility: CLINIC | Age: 58
End: 2018-11-27
Payer: COMMERCIAL

## 2018-11-27 VITALS
DIASTOLIC BLOOD PRESSURE: 74 MMHG | BODY MASS INDEX: 39.49 KG/M2 | HEIGHT: 65 IN | SYSTOLIC BLOOD PRESSURE: 126 MMHG | WEIGHT: 237 LBS

## 2018-11-27 DIAGNOSIS — E66.01 MORBID OBESITY DUE TO EXCESS CALORIES (H): ICD-10-CM

## 2018-11-27 DIAGNOSIS — M79.672 LEFT FOOT PAIN: Primary | ICD-10-CM

## 2018-11-27 DIAGNOSIS — E11.42 DIABETIC POLYNEUROPATHY ASSOCIATED WITH TYPE 2 DIABETES MELLITUS (H): ICD-10-CM

## 2018-11-27 DIAGNOSIS — M79.672 LEFT FOOT PAIN: ICD-10-CM

## 2018-11-27 DIAGNOSIS — M21.41 PES PLANUS OF BOTH FEET: ICD-10-CM

## 2018-11-27 DIAGNOSIS — R60.0 EDEMA OF BOTH FEET: ICD-10-CM

## 2018-11-27 DIAGNOSIS — M19.072 ARTHRITIS OF LEFT FOOT: ICD-10-CM

## 2018-11-27 DIAGNOSIS — M21.42 PES PLANUS OF BOTH FEET: ICD-10-CM

## 2018-11-27 PROCEDURE — 73630 X-RAY EXAM OF FOOT: CPT | Mod: LT

## 2018-11-27 PROCEDURE — 99213 OFFICE O/P EST LOW 20 MIN: CPT | Performed by: PODIATRIST

## 2018-11-27 NOTE — PROGRESS NOTES
PATIENT HISTORY:   Steve Morgan is a 58 year old male who presents to clinic for pain to the left foot. Would also like a diabetic foot check. Notes he has been starting to have sharp intermittent pain to the the left foot. Can be on the bottom or the top. Can be standing or sitting. A little more pain when walking. Notes he does not check his blood sugars and  They were very high when he did check them. Is seeing his primary next week.     Review of Systems:  Patient denies fever, chills, rash, wound, stiffness, limping, weakness, heart burn, blood in stool, chest pain with activity, calf pain when walking, shortness of breath with activity, chronic cough, easy bleeding/bruising, swelling of ankles, excessive thirst, fatigue, depression, anxiety.  Patient admits to numbness.     PAST MEDICAL HISTORY:   Past Medical History:   Diagnosis Date     Cellulitis and abscess of trunk 6/27/2017     Depression      Hyperlipidemia LDL goal <100 10/31/2010     Hypertension goal BP (blood pressure) < 140/90 3/17/2011     Hypothyroidism 1/12/2010     Morbid obesity due to excess calories (H) 1/12/2010     Neuropathy in diabetes (H) 1/12/2010     Obesity 1/12/2010     Sleep apnea 1/12/2010    CPAP     Type 2 diabetes, HbA1C goal < 8% (H) 3/8/2011        PAST SURGICAL HISTORY:   Past Surgical History:   Procedure Laterality Date     COLONOSCOPY       GENITOURINARY SURGERY      surg for undescended testicle     REPAIR HAMMER TOE BILATERAL  5/16/2013    Procedure: REPAIR HAMMER TOE BILATERAL;  Flexor Tenotomy Toes 2,3,4,5 Bilateral Feet;  Surgeon: Saad Bangura DPM;  Location:  OR        MEDICATIONS:   Current Outpatient Prescriptions:      ammonium lactate (LAC-HYDRIN) 12 % cream, Apply topically 2 times daily as needed for dry skin, Disp: 385 g, Rfl: 3     ASPIRIN 81 MG OR TABS, ONE DAILY, Disp: 100, Rfl: 3     atenolol (TENORMIN) 25 MG tablet, Take 1 tablet (25 mg) by mouth daily, Disp: 90 tablet, Rfl: 0     blood  glucose (ONE TOUCH VERIO IQ) test strip, Use to test blood sugars 2 times daily or as directed., Disp: 100 strip, Rfl: 0     blood glucose monitoring (KERLINE CONTOUR NEXT) test strip, Use to test blood sugar 3 times daily or as directed., Disp: 100 strip, Rfl: 11     blood glucose monitoring (KERLINE MICROLET) lancets, Use to test blood sugar 3 times daily or as directed., Disp: 100 each, Rfl: 11     blood glucose monitoring (ONE TOUCH DELICA) lancets, Use to test blood sugars 2 times daily or as directed., Disp: 1 Box, Rfl: prn     buPROPion (WELLBUTRIN XL) 300 MG 24 hr tablet, Take 1 tablet (300 mg) by mouth every morning, Disp: 90 tablet, Rfl: 1     Calcium Carb-Cholecalciferol (CALCIUM 600 + D PO), Take 1 tablet by mouth daily, Disp: , Rfl:      citalopram (CELEXA) 20 MG tablet, TAKE 1 TABLET (20 MG) BY MOUTH DAILY, Disp: 90 tablet, Rfl: 1     Continuous Blood Gluc  (FREESTYLE GIULIANA READER) SIENA, 1 kit as needed, Disp: 1 Device, Rfl: 1     continuous blood glucose monitoring (FREESTYLE GIULIANA) sensor, For use with Freestyle Giuliana Flash  for continuous monitioring of blood glucose levels. Replace sensor every 10 days., Disp: 3 each, Rfl: 6     Cyanocobalamin (VITAMIN B 12 PO), Take 250 mcg by mouth daily, Disp: , Rfl:      ferrous sulfate 325 (65 FE) MG tablet, Take 1 tablet by mouth daily (with breakfast)., Disp: , Rfl:      finasteride (PROSCAR) 5 MG tablet, Take 1 tablet (5 mg) by mouth daily, Disp: 90 tablet, Rfl: 0     glipiZIDE (GLUCOTROL XL) 10 MG 24 hr tablet, Take 2 tablets (20 mg) by mouth every morning, Disp: 180 tablet, Rfl: 1     hydrocortisone (WESTCORT) 0.2 % cream, Apply topically 2 times daily as needed Apply sparingly to affected area, BID., Disp: 45 g, Rfl: 1     insulin glargine (LANTUS SOLOSTAR) 100 UNIT/ML injection, 26 units qd.  Increase as directed to max dose of 45 units qd., Disp: 45 mL, Rfl: 1     insulin glargine (LANTUS SOLOSTAR) 100 UNIT/ML pen, Inject 26 Units  Subcutaneous daily, Disp: 15 mL, Rfl: 2     insulin pen needle (B-D U/F) 31G X 5 MM, Use 1 daily or as directed., Disp: 100 each, Rfl: 6     levothyroxine (LEVOXYL) 125 MCG tablet, Take 1 tablet (125 mcg) by mouth daily Dispense name brand Levoxyl, no generics, Disp: 30 tablet, Rfl: 1     losartan (COZAAR) 50 MG tablet, Take 1 tablet (50 mg) by mouth daily, Disp: 90 tablet, Rfl: 3     metFORMIN (GLUCOPHAGE) 1000 MG tablet, TAKE 1 TABLET (1,000 MG) BY MOUTH 2 TIMES DAILY (WITH MEALS), Disp: 180 tablet, Rfl: 3     MULTI-VITAMIN OR TABS, 1 TABLET DAILY, Disp: 30, Rfl: 0     omeprazole-sodium bicarbonate (ZEGERID)  MG per capsule, Take 1 capsule by mouth every morning (before breakfast), Disp: , Rfl:      order for DME, Equipment being ordered: Lt wrist splint, Disp: 1 Device, Rfl: 0     ORDER FOR DME, Equipment being ordered: four pronged cane, Disp: 1 Units, Rfl: 0     ORDER FOR DME, Equipment being ordered: single cane with rubber foot, Disp: 1 Units, Rfl: 0     simvastatin (ZOCOR) 20 MG tablet, Take 1 tablet (20 mg) by mouth At Bedtime, Disp: 90 tablet, Rfl: 3     UNABLE TO FIND, MEDICATION NAME: Prednisolone Acetate, Gatifloxacin and Bromfenac 1/0.5/0.075% - instill one drop in the surgery eye three times daily beginning 2 days before surgery, Disp: , Rfl:      VITAMIN D, CHOLECALCIFEROL, PO, Take 1,000 Units by mouth daily., Disp: , Rfl:   No current facility-administered medications for this visit.     Facility-Administered Medications Ordered in Other Visits:      ceFAZolin (ANCEF) 1 g vial to attach to IVPB, , , PRN, Stock, Rojelio, APRN CRNA, 2 g at 05/16/13 1206     ALLERGIES:  No Known Allergies     SOCIAL HISTORY:   Social History     Social History     Marital status:      Spouse name: Sri     Number of children: 2     Years of education: N/A     Occupational History      None      [employer]     Social History Main Topics     Smoking status: Never Smoker     Smokeless tobacco:  "Never Used     Alcohol use Yes      Comment: occ     Drug use: No     Sexual activity: Not Currently     Partners: Female     Other Topics Concern     Parent/Sibling W/ Cabg, Mi Or Angioplasty Before 65f 55m? No     Social History Narrative        FAMILY HISTORY:   Family History   Problem Relation Age of Onset     Breast Cancer Mother      Hypertension Mother      Thyroid Disease Mother      Depression Mother      Cancer - colorectal Father      Thyroid Disease Father      Depression Father      HEART DISEASE Maternal Grandmother      CHF     Circulatory Paternal Grandmother      Cancer Paternal Grandfather      Diabetes Brother      Diabetes Brother         EXAM:Vitals: /74  Ht 5' 5\" (1.651 m)  Wt 237 lb (107.5 kg)  BMI 39.44 kg/m2  BMI= Body mass index is 39.44 kg/(m^2).    A1C: 9.0 (6/2018)    General appearance: Patient is alert and fully cooperative with history & exam.  No sign of distress is noted during the visit.     Psychiatric: Affect is pleasant & appropriate.  Patient appears motivated to improve health.     Respiratory: Breathing is regular & unlabored while sitting.     HEENT: Hearing is intact to spoken word.  Speech is clear.  No gross evidence of visual impairment that would impact ambulation.     Dermatologic: Skin is intact to both lower extremities without significant lesions, rash or abrasion.  No paronychia or evidence of soft tissue infection is noted.     Vascular: DP & PT pulses are intact & regular bilaterally. edema and varicosities noted bilateral.  CFT and skin temperature is normal to both lower extremities.     Neurologic: Lower extremity sensation is diminished to both feet.      Musculoskeletal: Patient is ambulatory without assistive device or brace.  Decrease arch height. Minimal pain on palpation to the sinus tarsi left ankle. Muscle strength is 5/5 without pain to both feet.     Radiographs: no fractures noted. Arthritic changes to midfoot. Decrease calcaneal " inclination angle.      ASSESSMENT:    Left foot pain  Diabetic polyneuropathy associated with type 2 diabetes mellitus (H)  Morbid obesity due to excess calories (H)  Edema of both feet     PLAN:  Reviewed patient's chart in epic. Talked about diabetes and the feet. Concerned that he is not checking his blood sugars. His pain could be from neuropathy. Will get xray to assess for possible fractures although not as likely. Will call him with those results. Recommend topical anti inflammatories, was given order for this. Also was given order for compression socks.        Tena Mathis DPM, Podiatry/Foot and Ankle Surgery    Weight management plan: Patient was referred to their PCP to discuss a diet and exercise plan.    Patient to follow up with Primary Care provider regarding elevated blood pressure.

## 2018-11-27 NOTE — MR AVS SNAPSHOT
"              After Visit Summary   11/27/2018    Steve Morgna    MRN: [id number]           Patient Information     Date Of Birth          1960        Visit Information        Provider Department      11/27/2018 10:45 AM Tena Mathis DPM, Podiatry/Foot and Ankle Surgery [facility]        Today's Diagnoses     Left foot pain    -  1    Diabetic polyneuropathy associated with type 2 diabetes mellitus (H)        Morbid obesity due to excess calories (H)        Edema of both feet          Care Instructions    Thank you for choosing [facility]!    DR. MATHIS'S CLINIC SCHEDULE  MONDAY AM - [place] TUESDAY - [place]   [address] [place] [address] [place] [address]   [phone] / [phone] / [phone]       WEDNESDAY - [place] FRIDAY AM - [facility]   [address]   [place] [address] [place] [address]   [phone] / [phone]       FRIDAY PM - [place] SCHEDULE SURGERY: [phone]   [address] BILLING QUESTIONS: [phone]   [place] [address] AFTER HOURS: [phone]   [phone] / [phone] APPOINTMENTS: [phone]     Consumer Price Line (CPL) [phone]       DIABETES AND YOUR FEET  Diabetes can result in several problems in the feet including ulcers (open sores) and amputations. Two of the most important reasons why people develop foot problems when they have diabetes is : 1. Neuropathy (loss of feeling)  2. Vascular disease (loss or decrease of blood flow).    Neuropathy is a term used to describe a loss of nerve function.  Patients with diabetes are at risk of developing neuropathy if their sugars continue to run high and are above the normal value. One theory for neuropathy is that the \"extra\" sugar in the body enters the nerves and is broken down. These by-products build up in the nerve causing it to swell and " impairing nerve function. Often times, this can be prevented by controlling your sugars, dieting and exercise.    When a person develops neuropathy, they usually begin to feel numbness or tingling in their feet and sometime in their legs.  Other symptoms may include painful burning or hot feet, tingling or feeling like insects or ants are crawling on your feet or legs.  If the diabetes is sever and the sugars run high for long periods of time, neuropathy can also occur in the hands.    Vascular disease  is a term used to describe a loss or decrease in circulation (blood flow). There is a problem in getting blood and oxygen to areas that need it. Similar to neuropathy, sugars can build up in the walls of the arteries (blood vessels) and cause them to become swollen, thickened and hardened. This decreases the amount of blood that can go to an area that needs it. Though this is common in the legs of diabetic patients, it can also affect other arteries (blood vessels) in the body such as in the heart and eyes.    In the legs, vascular disease usually results in cramping. Patients who develop leg cramps after walking the same distance every time (i.e. One block, half a mile, ect.) need to let their doctors know so that their circulation may be checked. Cramps causing severe pain in the feet and/or legs while sleeping and the cramps go away when you stand or hang your legs off the side of the bed, may also be a sign of poor blood circulation.  Occasional cramping in cold weather or on rare occasions with activity may not be due to poor circulation, but you should inform your doctor.    PREVENTION OF THESE DISEASES  The key to prevention is good blood sugar control. Poor blood sugar control is a big reason many of these problems start. Physical activity (exercise) is a very good way to help decrease your blood sugars. Exercise can lower your blood sugar, blood pressure, and cholesterol. It also reduces your risk for heart  disease and stroke, relieves stress, and strengthens your heart, muscles and bones.  In addition, regular activity helps insulin work better, improves your blood circulation, and keeps your joints flexible. If you're trying to lose weight, a combination of exercise and wise food choices can help you reach your target weight and maintain it.      PAIN MANAGEMENT  1.Blood Sugar Control - Most important  2. Medications such as:  Amytriptylline, duloxetine, gabapentin, lyrica, tramadol  3. Nutritional therapy:  Vitamin B6 (100mg daily), Vitamin B12 (75mcg daily), Vitamin D 2000 IU daily), Alpha-Lipoic Acid (600-1800mg daily), Acetyl-L-Carnitine (500-1000mg TID, L-methyl folate (1500mcg daily)    ** Metformin can block Vitamin B6 and B12 so it is important to supplement**    FOOT CARE RECOMMENDATIONS   1. Wash your feet with lukewarm water and a mild soap and then dry them thoroughly, especially between the toes.     2. Examine your feet daily looking for cuts, corns, blisters, cracks, ect, especially after wearing new shoes. Make sure to look between your toes. If you cannot see the bottom of your feet, set a mirror on the floor and hold your foot over it, or ask a spouse, friend or family member to examine your feet for you. Contact your doctor immediately if new problems are noted or if sores are not healing.     3. Immediately apply moisturizer to the tops and bottoms of your feet, avoiding areas between the toes. Hand lotion (Intesive Care, Karla, Eucerin, Neutrogena, Curel, ect) is sufficient unless your doctor prescribes a medicated lotion. Apply sunscreen to your feet when going swimming outside.     4. Use clean comfortable shoes, wear white socks (if you have any bleeding or drainage, you will see it on white socks). Socks should not have thick seams or cut off the circulation around the leg. Break in new shoes slowly and rotate with older shoes until broken in. Check the inside of your shoes with your hand to  look for areas of irritation or objects that may have fallen into your shoes.       5. Keep slippers by the side of your bed for use during the night.     6.  Shoes should be fitted by a professional and should not cause areas of irritation.  Check your feet regularly when wearing a new pair of shoes and replace them as needed.     7.  Talk to your doctor about proper exercise. Exercise and stretching stimulate blood flow to your feet and maintain proper glucose levels.     8.  Monitor your blood glucose level as instructed by your doctor. Notify your doctor immediately if your blood sugar is abnormally high or low.    9. Cut your nails straight across, but then gently round any sharp edges with a cardboard nail file. If you have neuropathy, peripheral vascular disease or cannot see that well to trim your own toenails contact [facility] ([phone]) or [facility] ([phone]).      THINGS TO AVOID DOING   1.  Do not soak your feet if you have an open sore. Use only lukewarm water and always check the temperature with your hand as hot water can easily burn your feet.       2.  Never use a hot water bottle or heating pad on your feet. Also do not apply cold compresses to your feet. With decreased sensation, you could burn or freeze your feet.       3.  Do not apply any of these to your feet:    -  Over the counter medicine for corns or warts    -  Harsh chemicals like boric acid    -  Do not self-treat corns, cuts, blisters or infections. Always consult your doctor.       4.  Do not wear sandals, slippers or walk barefoot, especially on hot sand or concrete or other harsh surfaces.     5.  If you smoke, stop!!!          OSTEOARTHRITIS OF THE FOOT & ANKLE  Osteoarthritis is a condition characterized by the breakdown and eventual loss of cartilage in one or more joints. Cartilage (the connective tissue found at the end of the bones in the joints) protects and cushions the bones during movement. When cartilage  deteriorates or is lost, symptoms develop that can restrict one s ability to easily perform daily activities.  Osteoarthritis is also known as degenerative arthritis, reflecting its nature to develop as part of the aging process. As the most common form of arthritis, osteoarthritis affects millions of Americans. Some people refer to osteoarthritis simply as arthritis, even though there are many different types of arthritis.  Osteoarthritis appears at various joints throughout the body, including the hands, feet, spine, hips, and knees. In the foot, the disease most frequently occurs in the big toe, although it is also often found in the midfoot and ankle.  CAUSES  Osteoarthritis is considered a  wear and tear  disease because the cartilage in the joint wears down with repeated stress and use over time. As the cartilage deteriorates and gets thinner, the bones lose their protective covering and eventually may rub together, causing pain and inflammation of the joint.  An injury may also lead to osteoarthritis, although it may take months or years after the injury for the condition to develop. For example, osteoarthritis in the big toe is often caused by kicking or jamming the toe, or by dropping something on the toe. Osteoarthritis in the midfoot is often caused by dropping something on it, or by a sprain or fracture. In the ankle, osteoarthritis is usually caused by a fracture and occasionally by a severe sprain.  Sometimes osteoarthritis develops as a result of abnormal foot mechanics such as flat feet or high arches. A flat foot causes less stability in the ligaments (bands of tissue that connect bones), resulting in excessive strain on the joints, which can cause arthritis. A high arch is rigid and lacks mobility, causing a jamming of joints that creates an increased risk of arthritis.  SYMPTOMS  People with osteoarthritis in the foot or ankle experience, in varying degrees, one or more of the following: Pain and  stiffness in the joint, swelling in or near the joint, or difficulty walking or bending the joint.   Some patients with osteoarthritis also develop a bone spur (a bony protrusion) at the affected joint. Shoe pressure may cause pain at the site of a bone spur, and in some cases blisters or calluses may form over its surface. Bone spurs can also limit the movement of the joint.    DIAGNOSIS  In diagnosing osteoarthritis, the foot and ankle surgeon will examine the foot thoroughly, looking for swelling in the joint, limited mobility, and pain with movement. In some cases, deformity and/or enlargement (spur) of the joint may be noted. X-rays may be ordered to evaluate the extent of the disease.  NON-SURGICAL TREATMENT  To help relieve symptoms, the surgeon may begin treating osteoarthritis with one or more of the following non-surgical approaches:  Oral medications. Nonsteroidal anti-inflammatory drugs (NSAIDs), such as ibuprofen, are often helpful in reducing the inflammation and pain. Occasionally a prescription for a steroid medication is needed to adequately reduce symptoms.   Orthotic devices. Custom orthotic devices (shoe inserts) are often prescribed to provide support to improve the foot s mechanics or cushioning to help minimize pain.   Bracing. Bracing, which restricts motion and supports the joint, can reduce pain during walking and help prevent further deformity.   Immobilization. Protecting the foot from movement by wearing a cast or removable cast-boot may be necessary to allow the inflammation to resolve.   Steroid injections. In some cases, steroid injections are applied to the affected joint to deliver anti-inflammatory medication.   Physical therapy. Exercises to strengthen the muscles, especially when the osteoarthritis occurs in the ankle, may give the patient greater stability and help avoid injury that might worsen the condition.   DO I NEED SURGERY?  When osteoarthritis has progressed  substantially or failed to improve with non-surgical treatment, surgery may be recommended. In advanced cases, surgery may be the only option. The goal of surgery is to decrease pain and improve function. The foot and ankle surgeon will consider a number of factors when selecting the procedure best suited to the patient s condition and lifestyle.    Home Medical Equipment:  1. [facility]    [facility] -[address]  [address]   [place] [phone]     2. [facility] - [address], [place], [phone]        Body Mass Index (BMI)  Many things can cause foot and ankle problems. Foot structure, activity level, foot mechanics and injuries are common causes of pain.  One very important issue that often goes unmentioned, is body weight. Extra weight can cause increased stress on muscles, ligaments, bones and tendons.  Sometimes just a few extra pounds is all it takes to put one over her/his threshold. Without reducing that stress, it can be difficult to alleviate pain. Some people are uncomfortable addressing this issue, but we feel it is important for you to think about it. As Foot &  Ankle specialists, our job is addressing the lower extremity problem and possible causes. Regarding extra body weight, we encourage patients to discuss diet and weight management plans with their primary care doctors. It is this team approach that gives you the best opportunity for pain relief and getting you back on your feet.                  Follow-ups after your visit        Your next 10 appointments already scheduled     Dec 14, 2018  9:30 AM CST   Return Visit with BRIDGET Payne CNP   [facility]    [address]  [facility] [address]   [phone]            Apr 16, 2019  9:30 AM CDT   Travel Injection with PRABHA BAJWA/LPN   [place]  "[facility])    [address] [facility] [id number] [phone]              Future tests that were ordered for you today     Open Future Orders        Priority Expected Expires Ordered    XR Foot Left G/E 3 Views Routine 11/27/2018 11/27/2019 11/27/2018            Who to contact     If you have questions or need follow up information about today's clinic visit or your schedule please contact [facility] directly at [phone].  Normal or non-critical lab and imaging results will be communicated to you by [employer], letter or phone within 4 business days after the clinic has received the results. If you do not hear from us within 7 days, please contact the clinic through [employer] or phone. If you have a critical or abnormal lab result, we will notify you by phone as soon as possible.  Submit refill requests through [employer] or call your pharmacy and they will forward the refill request to us. Please allow 3 business days for your refill to be completed.          Additional Information About Your Visit        [facility] Information     [employer] gives you secure access to your electronic health record. If you see a primary care provider, you can also send messages to your care team and make appointments. If you have questions, please call your primary care clinic.  If you do not have a primary care provider, please call [phone] and they will assist you.        Care EveryWhere ID     This is your Care EveryWhere ID. This could be used by other organizations to access your [facility] medical records  [id number]        Your Vitals Were     Height BMI (Body Mass Index)                5' 5\" (1.651 m) 39.44 kg/m2           Blood Pressure from Last 3 Encounters:   11/27/18 126/74   11/19/18 122/76   10/16/18 136/80    Weight from Last 3 Encounters:   11/27/18 237 lb (107.5 kg)   11/19/18 237 lb (107.5 kg)   10/16/18 240 lb (108.9 kg)               "   Today's Medication Changes          These changes are accurate as of 11/27/18 11:04 AM.  If you have any questions, ask your nurse or doctor.               Start taking these medicines.        Dose/Directions    diclofenac 1 % topical gel   Commonly known as:  VOLTAREN   Used for:  Left foot pain, Diabetic polyneuropathy associated with type 2 diabetes mellitus (H), Morbid obesity due to excess calories (H), Edema of both feet   Started by:  Tena Mathis DPM, Podiatry/Foot and Ankle Surgery        Apply 4 grams to knees or 2 grams to hands four times daily using enclosed dosing card.   Quantity:  100 g   Refills:  1       order for DME   Used for:  Left foot pain, Diabetic polyneuropathy associated with type 2 diabetes mellitus (H), Morbid obesity due to excess calories (H), Edema of both feet   Started by:  Tena Mathis DPM, Podiatry/Foot and Ankle Surgery        Equipment being ordered: Please dispense up to 3 pairs of knee high compression stockings at 20-30 mmHg and one donning device if needed.   Quantity:  4 Device   Refills:  0            Where to get your medicines      These medications were sent to [facility]/pharmacy #[id number] - [facility] [address] [facility].  [address] [place] [address] [email or web address] [address]     Phone:  [phone]     diclofenac 1 % topical gel         Some of these will need a paper prescription and others can be bought over the counter.  Ask your nurse if you have questions.     Bring a paper prescription for each of these medications     order for DME                Primary Care Provider Office Phone # Fax #    Lisa Pierre PA-C [phone] [address] [facility] [address]        Equal Access to Services     [facility]: Hadii ana henriquez Solisandra, waaxda luqadaha, qaybta kaalmada sarika, jayson schwartz. So [facility] [phone].    ATENCIÓN: Si habla español, tiene a adler disposición servicios gratuitos de asistencia lingüística. Llame  al [phone].    We comply with applicable federal civil rights laws and [place] laws. We do not discriminate on the basis of race, color, national origin, age, disability, sex, sexual orientation, or gender identity.            Thank you!     Thank you for choosing [facility]  for your care. Our goal is always to provide you with excellent care. Hearing back from our patients is one way we can continue to improve our services. Please take a few minutes to complete the written survey that you may receive in the mail after your visit with us. Thank you!             Your Updated Medication List - Protect others around you: Learn how to safely use, store and throw away your medicines at www.disposemymeds.org.          This list is accurate as of 11/27/18 11:04 AM.  Always use your most recent med list.                   Brand Name Dispense Instructions for use Diagnosis    ammonium lactate 12 % cream    LAC-HYDRIN    385 g    Apply topically 2 times daily as needed for dry skin    Diabetic polyneuropathy associated with type 2 diabetes mellitus (H), Pre-ulcerative calluses, Pes planus of both feet       aspirin 81 MG tablet    ASA    100    ONE DAILY        atenolol 25 MG tablet    TENORMIN    90 tablet    Take 1 tablet (25 mg) by mouth daily    Essential hypertension with goal blood pressure less than 140/90       * blood glucose monitoring lancets     1 Box    Use to test blood sugars 2 times daily or as directed.    Type 2 diabetes, HbA1C goal < 8% (H)       * blood glucose monitoring lancets     100 each    Use to test blood sugar 3 times daily or as directed.    Type 2 diabetes mellitus with diabetic neuropathy, unspecified long term insulin use status       * blood glucose monitoring test strip    ONETOUCH VERIO IQ    100 strip    Use to test blood sugars 2 times daily or as directed.    Type 2 diabetes, HbA1C goal < 8% (H)       * blood glucose monitoring test strip    KERLINE CONTOUR NEXT     100 strip    Use to test blood sugar 3 times daily or as directed.    Type 2 diabetes mellitus with diabetic neuropathy, unspecified long term insulin use status       buPROPion 300 MG 24 hr tablet    WELLBUTRIN XL    90 tablet    Take 1 tablet (300 mg) by mouth every morning    Excessive anger       CALCIUM 600 + D PO      Take 1 tablet by mouth daily        citalopram 20 MG tablet    celeXA    90 tablet    TAKE 1 TABLET (20 MG) BY MOUTH DAILY    Excessive anger       continuous blood glucose monitoring sensor     3 each    For use with Freestyle Nina Flash  for continuous monitioring of blood glucose levels. Replace sensor every 10 days.    Type 2 diabetes mellitus with diabetic neuropathy, with long-term current use of insulin (H)       diclofenac 1 % topical gel    VOLTAREN    100 g    Apply 4 grams to knees or 2 grams to hands four times daily using enclosed dosing card.    Left foot pain, Diabetic polyneuropathy associated with type 2 diabetes mellitus (H), Morbid obesity due to excess calories (H), Edema of both feet       ferrous sulfate 325 (65 Fe) MG tablet    IRON     Take 1 tablet by mouth daily (with breakfast).        finasteride 5 MG tablet    PROSCAR    90 tablet    Take 1 tablet (5 mg) by mouth daily    Benign prostatic hyperplasia with urinary hesitancy       FREESTYLE NINA READER Georgette     1 Device    1 kit as needed    Type 2 diabetes mellitus with diabetic neuropathy, with long-term current use of insulin (H)       glipiZIDE 10 MG 24 hr tablet    GLUCOTROL XL    180 tablet    Take 2 tablets (20 mg) by mouth every morning    Type 2 diabetes mellitus with diabetic neuropathy, without long-term current use of insulin (H)       hydrocortisone 0.2 % cream    WESTCORT    45 g    Apply topically 2 times daily as needed Apply sparingly to affected area, BID.    Dermatitis       * insulin glargine 100 UNIT/ML pen    LANTUS SOLOSTAR    45 mL    26 units qd.  Increase as directed to max dose of  45 units qd.    Type 2 diabetes mellitus with diabetic neuropathy, with long-term current use of insulin (H)       * insulin glargine 100 UNIT/ML pen    LANTUS SOLOSTAR    15 mL    Inject 26 Units Subcutaneous daily    Type 2 diabetes mellitus with diabetic neuropathy, with long-term current use of insulin (H)       insulin pen needle 31G X 5 MM miscellaneous    B-D U/F    100 each    Use 1 daily or as directed.    Type 2 diabetes mellitus with diabetic neuropathy, unspecified long term insulin use status       levothyroxine 125 MCG tablet    LEVOXYL    30 tablet    Take 1 tablet (125 mcg) by mouth daily Dispense name brand Levoxyl, no generics    Hypothyroidism due to acquired atrophy of thyroid       losartan 50 MG tablet    COZAAR    90 tablet    Take 1 tablet (50 mg) by mouth daily    Essential hypertension with goal blood pressure less than 140/90       metFORMIN 1000 MG tablet    GLUCOPHAGE    180 tablet    TAKE 1 TABLET (1,000 MG) BY MOUTH 2 TIMES DAILY (WITH MEALS)    Type 2 diabetes mellitus with diabetic neuropathy, without long-term current use of insulin (H)       Multi-vitamin tablet   Generic drug:  multivitamin w/minerals     30    1 TABLET DAILY        omeprazole-sodium bicarbonate  MG capsule    ZEGERID     Take 1 capsule by mouth every morning (before breakfast)        order for DME     1 Units    Equipment being ordered: single cane with rubber foot    Type 2 diabetes, HbA1C goal < 8% (H), Neuropathy in diabetes (H), Unsteady gait       order for DME     1 Units    Equipment being ordered: four pronged cane    Unsteady gait, Neuropathy in diabetes (H), Type 2 diabetes, HbA1C goal < 8% (H)       order for DME     1 Device    Equipment being ordered: Lt wrist splint    Injury of left wrist, initial encounter       order for DME     4 Device    Equipment being ordered: Please dispense up to 3 pairs of knee high compression stockings at 20-30 mmHg and one donning device if needed.    Left foot  pain, Diabetic polyneuropathy associated with type 2 diabetes mellitus (H), Morbid obesity due to excess calories (H), Edema of both feet       simvastatin 20 MG tablet    ZOCOR    90 tablet    Take 1 tablet (20 mg) by mouth At Bedtime    Hyperlipidemia LDL goal <100       UNABLE TO FIND      MEDICATION NAME: Prednisolone Acetate, Gatifloxacin and Bromfenac 1/0.5/0.075% - instill one drop in the surgery eye three times daily beginning 2 days before surgery        VITAMIN B 12 PO      Take 250 mcg by mouth daily    Excessive anger       VITAMIN D (CHOLECALCIFEROL) PO      Take 1,000 Units by mouth daily.        * Notice:  This list has 6 medication(s) that are the same as other medications prescribed for you. Read the directions carefully, and ask your doctor or other care provider to review them with you.

## 2018-11-27 NOTE — PATIENT INSTRUCTIONS
"Thank you for choosing [facility]!    DR. AKBAR'S CLINIC SCHEDULE  MONDAY AM - [place] TUESDAY - [place]   [address] [address] [place] [address] [place] [address]   [phone] / [phone] / [phone]       WEDNESDAY - [place] FRIDAY AM - [facility]   [address]   [place] [address] [place] [address] [address]   [phone] / [phone]       FRIDAY PM - [place] SCHEDULE SURGERY: [phone]   [address] BILLING QUESTIONS: [phone]   [place] [address] AFTER HOURS: [phone] / [phone] APPOINTMENTS: [phone]     Consumer Price Line (CPL) [phone]       DIABETES AND YOUR FEET  Diabetes can result in several problems in the feet including ulcers (open sores) and amputations. Two of the most important reasons why people develop foot problems when they have diabetes is : 1. Neuropathy (loss of feeling)  2. Vascular disease (loss or decrease of blood flow).    Neuropathy is a term used to describe a loss of nerve function.  Patients with diabetes are at risk of developing neuropathy if their sugars continue to run high and are above the normal value. One theory for neuropathy is that the \"extra\" sugar in the body enters the nerves and is broken down. These by-products build up in the nerve causing it to swell and impairing nerve function. Often times, this can be prevented by controlling your sugars, dieting and exercise.    When a person develops neuropathy, they usually begin to feel numbness or tingling in their feet and sometime in their legs.  Other symptoms may include painful burning or hot feet, tingling or feeling like insects or ants are crawling on your feet or legs.  If the diabetes is sever and the sugars run high for long periods of time, neuropathy can also occur in the hands.    Vascular disease  is a term used to describe a " loss or decrease in circulation (blood flow). There is a problem in getting blood and oxygen to areas that need it. Similar to neuropathy, sugars can build up in the walls of the arteries (blood vessels) and cause them to become swollen, thickened and hardened. This decreases the amount of blood that can go to an area that needs it. Though this is common in the legs of diabetic patients, it can also affect other arteries (blood vessels) in the body such as in the heart and eyes.    In the legs, vascular disease usually results in cramping. Patients who develop leg cramps after walking the same distance every time (i.e. One block, half a mile, ect.) need to let their doctors know so that their circulation may be checked. Cramps causing severe pain in the feet and/or legs while sleeping and the cramps go away when you stand or hang your legs off the side of the bed, may also be a sign of poor blood circulation.  Occasional cramping in cold weather or on rare occasions with activity may not be due to poor circulation, but you should inform your doctor.    PREVENTION OF THESE DISEASES  The key to prevention is good blood sugar control. Poor blood sugar control is a big reason many of these problems start. Physical activity (exercise) is a very good way to help decrease your blood sugars. Exercise can lower your blood sugar, blood pressure, and cholesterol. It also reduces your risk for heart disease and stroke, relieves stress, and strengthens your heart, muscles and bones.  In addition, regular activity helps insulin work better, improves your blood circulation, and keeps your joints flexible. If you're trying to lose weight, a combination of exercise and wise food choices can help you reach your target weight and maintain it.      PAIN MANAGEMENT  1.Blood Sugar Control - Most important  2. Medications such as:  Amytriptylline, duloxetine, gabapentin, lyrica, tramadol  3. Nutritional therapy:  Vitamin B6 (100mg daily),  Vitamin B12 (75mcg daily), Vitamin D 2000 IU daily), Alpha-Lipoic Acid (600-1800mg daily), Acetyl-L-Carnitine (500-1000mg TID, L-methyl folate (1500mcg daily)    ** Metformin can block Vitamin B6 and B12 so it is important to supplement**    FOOT CARE RECOMMENDATIONS   1. Wash your feet with lukewarm water and a mild soap and then dry them thoroughly, especially between the toes.     2. Examine your feet daily looking for cuts, corns, blisters, cracks, ect, especially after wearing new shoes. Make sure to look between your toes. If you cannot see the bottom of your feet, set a mirror on the floor and hold your foot over it, or ask a spouse, friend or family member to examine your feet for you. Contact your doctor immediately if new problems are noted or if sores are not healing.     3. Immediately apply moisturizer to the tops and bottoms of your feet, avoiding areas between the toes. Hand lotion (Intesive Care, Karla, Eucerin, Neutrogena, Curel, ect) is sufficient unless your doctor prescribes a medicated lotion. Apply sunscreen to your feet when going swimming outside.     4. Use clean comfortable shoes, wear white socks (if you have any bleeding or drainage, you will see it on white socks). Socks should not have thick seams or cut off the circulation around the leg. Break in new shoes slowly and rotate with older shoes until broken in. Check the inside of your shoes with your hand to look for areas of irritation or objects that may have fallen into your shoes.       5. Keep slippers by the side of your bed for use during the night.     6.  Shoes should be fitted by a professional and should not cause areas of irritation.  Check your feet regularly when wearing a new pair of shoes and replace them as needed.     7.  Talk to your doctor about proper exercise. Exercise and stretching stimulate blood flow to your feet and maintain proper glucose levels.     8.  Monitor your blood glucose level as instructed by your  doctor. Notify your doctor immediately if your blood sugar is abnormally high or low.    9. Cut your nails straight across, but then gently round any sharp edges with a cardboard nail file. If you have neuropathy, peripheral vascular disease or cannot see that well to trim your own toenails contact [facility] ([phone]) or [facility] ([phone]).      THINGS TO AVOID DOING   1.  Do not soak your feet if you have an open sore. Use only lukewarm water and always check the temperature with your hand as hot water can easily burn your feet.       2.  Never use a hot water bottle or heating pad on your feet. Also do not apply cold compresses to your feet. With decreased sensation, you could burn or freeze your feet.       3.  Do not apply any of these to your feet:    -  Over the counter medicine for corns or warts    -  Harsh chemicals like boric acid    -  Do not self-treat corns, cuts, blisters or infections. Always consult your doctor.       4.  Do not wear sandals, slippers or walk barefoot, especially on hot sand or concrete or other harsh surfaces.     5.  If you smoke, stop!!!          OSTEOARTHRITIS OF THE FOOT & ANKLE  Osteoarthritis is a condition characterized by the breakdown and eventual loss of cartilage in one or more joints. Cartilage (the connective tissue found at the end of the bones in the joints) protects and cushions the bones during movement. When cartilage deteriorates or is lost, symptoms develop that can restrict one s ability to easily perform daily activities.  Osteoarthritis is also known as degenerative arthritis, reflecting its nature to develop as part of the aging process. As the most common form of arthritis, osteoarthritis affects millions of Americans. Some people refer to osteoarthritis simply as arthritis, even though there are many different types of arthritis.  Osteoarthritis appears at various joints throughout the body, including the hands, feet, spine, hips, and knees.  In the foot, the disease most frequently occurs in the big toe, although it is also often found in the midfoot and ankle.  CAUSES  Osteoarthritis is considered a  wear and tear  disease because the cartilage in the joint wears down with repeated stress and use over time. As the cartilage deteriorates and gets thinner, the bones lose their protective covering and eventually may rub together, causing pain and inflammation of the joint.  An injury may also lead to osteoarthritis, although it may take months or years after the injury for the condition to develop. For example, osteoarthritis in the big toe is often caused by kicking or jamming the toe, or by dropping something on the toe. Osteoarthritis in the midfoot is often caused by dropping something on it, or by a sprain or fracture. In the ankle, osteoarthritis is usually caused by a fracture and occasionally by a severe sprain.  Sometimes osteoarthritis develops as a result of abnormal foot mechanics such as flat feet or high arches. A flat foot causes less stability in the ligaments (bands of tissue that connect bones), resulting in excessive strain on the joints, which can cause arthritis. A high arch is rigid and lacks mobility, causing a jamming of joints that creates an increased risk of arthritis.  SYMPTOMS  People with osteoarthritis in the foot or ankle experience, in varying degrees, one or more of the following: Pain and stiffness in the joint, swelling in or near the joint, or difficulty walking or bending the joint.   Some patients with osteoarthritis also develop a bone spur (a bony protrusion) at the affected joint. Shoe pressure may cause pain at the site of a bone spur, and in some cases blisters or calluses may form over its surface. Bone spurs can also limit the movement of the joint.    DIAGNOSIS  In diagnosing osteoarthritis, the foot and ankle surgeon will examine the foot thoroughly, looking for swelling in the joint, limited mobility, and  pain with movement. In some cases, deformity and/or enlargement (spur) of the joint may be noted. X-rays may be ordered to evaluate the extent of the disease.  NON-SURGICAL TREATMENT  To help relieve symptoms, the surgeon may begin treating osteoarthritis with one or more of the following non-surgical approaches:  Oral medications. Nonsteroidal anti-inflammatory drugs (NSAIDs), such as ibuprofen, are often helpful in reducing the inflammation and pain. Occasionally a prescription for a steroid medication is needed to adequately reduce symptoms.   Orthotic devices. Custom orthotic devices (shoe inserts) are often prescribed to provide support to improve the foot s mechanics or cushioning to help minimize pain.   Bracing. Bracing, which restricts motion and supports the joint, can reduce pain during walking and help prevent further deformity.   Immobilization. Protecting the foot from movement by wearing a cast or removable cast-boot may be necessary to allow the inflammation to resolve.   Steroid injections. In some cases, steroid injections are applied to the affected joint to deliver anti-inflammatory medication.   Physical therapy. Exercises to strengthen the muscles, especially when the osteoarthritis occurs in the ankle, may give the patient greater stability and help avoid injury that might worsen the condition.   DO I NEED SURGERY?  When osteoarthritis has progressed substantially or failed to improve with non-surgical treatment, surgery may be recommended. In advanced cases, surgery may be the only option. The goal of surgery is to decrease pain and improve function. The foot and ankle surgeon will consider a number of factors when selecting the procedure best suited to the patient s condition and lifestyle.    Home Medical Equipment:  1. [facility]    [facility] -[address]  [address]   [place] [phone]     2. [facility]  [facility] [address], [place], [phone]        Body Mass Index (BMI)  Many things can cause foot and ankle problems. Foot structure, activity level, foot mechanics and injuries are common causes of pain.  One very important issue that often goes unmentioned, is body weight. Extra weight can cause increased stress on muscles, ligaments, bones and tendons.  Sometimes just a few extra pounds is all it takes to put one over her/his threshold. Without reducing that stress, it can be difficult to alleviate pain. Some people are uncomfortable addressing this issue, but we feel it is important for you to think about it. As Foot &  Ankle specialists, our job is addressing the lower extremity problem and possible causes. Regarding extra body weight, we encourage patients to discuss diet and weight management plans with their primary care doctors. It is this team approach that gives you the best opportunity for pain relief and getting you back on your feet.

## 2018-11-27 NOTE — LETTER
11/27/2018         RE: Steve Morgan  [address]  [facility] [address]        Dear Colleague,    Thank you for referring your patient, Steve Morgan, to the [facility]. Please see a copy of my visit note below.    PATIENT HISTORY:   Steve Morgan is a 58 year old male who presents to clinic for pain to the left foot. Would also like a diabetic foot check. Notes he has been starting to have sharp intermittent pain to the the left foot. Can be on the bottom or the top. Can be standing or sitting. A little more pain when walking. Notes he does not check his blood sugars and  They were very high when he did check them. Is seeing his primary next week.     Review of Systems:  Patient denies fever, chills, rash, wound, stiffness, limping, weakness, heart burn, blood in stool, chest pain with activity, calf pain when walking, shortness of breath with activity, chronic cough, easy bleeding/bruising, swelling of ankles, excessive thirst, fatigue, depression, anxiety.  Patient admits to numbness.     PAST MEDICAL HISTORY:   Past Medical History:   Diagnosis Date     Cellulitis and abscess of trunk 6/27/2017     Depression      Hyperlipidemia LDL goal <100 10/31/2010     Hypertension goal BP (blood pressure) < 140/90 3/17/2011     Hypothyroidism 1/12/2010     Morbid obesity due to excess calories (H) 1/12/2010     Neuropathy in diabetes (H) 1/12/2010     Obesity 1/12/2010     Sleep apnea 1/12/2010    CPAP     Type 2 diabetes, HbA1C goal < 8% (H) 3/8/2011        PAST SURGICAL HISTORY:   Past Surgical History:   Procedure Laterality Date     COLONOSCOPY       GENITOURINARY SURGERY      surg for undescended testicle     REPAIR HAMMER TOE BILATERAL  5/16/2013    Procedure: REPAIR HAMMER TOE BILATERAL;  Flexor Tenotomy Toes 2,3,4,5 Bilateral Feet;  Surgeon: Saad Bangura DPM;  Location:  OR        MEDICATIONS:   Current Outpatient Prescriptions:      ammonium lactate (LAC-HYDRIN) 12 % cream,  Apply topically 2 times daily as needed for dry skin, Disp: 385 g, Rfl: 3     ASPIRIN 81 MG OR TABS, ONE DAILY, Disp: 100, Rfl: 3     atenolol (TENORMIN) 25 MG tablet, Take 1 tablet (25 mg) by mouth daily, Disp: 90 tablet, Rfl: 0     blood glucose (ONE TOUCH VERIO IQ) test strip, Use to test blood sugars 2 times daily or as directed., Disp: 100 strip, Rfl: 0     blood glucose monitoring (KERLINE CONTOUR NEXT) test strip, Use to test blood sugar 3 times daily or as directed., Disp: 100 strip, Rfl: 11     blood glucose monitoring (KERLINE MICROLET) lancets, Use to test blood sugar 3 times daily or as directed., Disp: 100 each, Rfl: 11     blood glucose monitoring (ONE TOUCH DELICA) lancets, Use to test blood sugars 2 times daily or as directed., Disp: 1 Box, Rfl: prn     buPROPion (WELLBUTRIN XL) 300 MG 24 hr tablet, Take 1 tablet (300 mg) by mouth every morning, Disp: 90 tablet, Rfl: 1     Calcium Carb-Cholecalciferol (CALCIUM 600 + D PO), Take 1 tablet by mouth daily, Disp: , Rfl:      citalopram (CELEXA) 20 MG tablet, TAKE 1 TABLET (20 MG) BY MOUTH DAILY, Disp: 90 tablet, Rfl: 1     Continuous Blood Gluc  (FREESTYLE GIULIANA READER) SIENA, 1 kit as needed, Disp: 1 Device, Rfl: 1     continuous blood glucose monitoring (FREESTYLE GIULIANA) sensor, For use with Freestyle Giuliana Flash  for continuous monitioring of blood glucose levels. Replace sensor every 10 days., Disp: 3 each, Rfl: 6     Cyanocobalamin (VITAMIN B 12 PO), Take 250 mcg by mouth daily, Disp: , Rfl:      ferrous sulfate 325 (65 FE) MG tablet, Take 1 tablet by mouth daily (with breakfast)., Disp: , Rfl:      finasteride (PROSCAR) 5 MG tablet, Take 1 tablet (5 mg) by mouth daily, Disp: 90 tablet, Rfl: 0     glipiZIDE (GLUCOTROL XL) 10 MG 24 hr tablet, Take 2 tablets (20 mg) by mouth every morning, Disp: 180 tablet, Rfl: 1     hydrocortisone (WESTCORT) 0.2 % cream, Apply topically 2 times daily as needed Apply sparingly to affected area, BID., Disp:  45 g, Rfl: 1     insulin glargine (LANTUS SOLOSTAR) 100 UNIT/ML injection, 26 units qd.  Increase as directed to max dose of 45 units qd., Disp: 45 mL, Rfl: 1     insulin glargine (LANTUS SOLOSTAR) 100 UNIT/ML pen, Inject 26 Units Subcutaneous daily, Disp: 15 mL, Rfl: 2     insulin pen needle (B-D U/F) 31G X 5 MM, Use 1 daily or as directed., Disp: 100 each, Rfl: 6     levothyroxine (LEVOXYL) 125 MCG tablet, Take 1 tablet (125 mcg) by mouth daily Dispense name brand Levoxyl, no generics, Disp: 30 tablet, Rfl: 1     losartan (COZAAR) 50 MG tablet, Take 1 tablet (50 mg) by mouth daily, Disp: 90 tablet, Rfl: 3     metFORMIN (GLUCOPHAGE) 1000 MG tablet, TAKE 1 TABLET (1,000 MG) BY MOUTH 2 TIMES DAILY (WITH MEALS), Disp: 180 tablet, Rfl: 3     MULTI-VITAMIN OR TABS, 1 TABLET DAILY, Disp: 30, Rfl: 0     omeprazole-sodium bicarbonate (ZEGERID)  MG per capsule, Take 1 capsule by mouth every morning (before breakfast), Disp: , Rfl:      order for DME, Equipment being ordered: Lt wrist splint, Disp: 1 Device, Rfl: 0     ORDER FOR DME, Equipment being ordered: four pronged cane, Disp: 1 Units, Rfl: 0     ORDER FOR DME, Equipment being ordered: single cane with rubber foot, Disp: 1 Units, Rfl: 0     simvastatin (ZOCOR) 20 MG tablet, Take 1 tablet (20 mg) by mouth At Bedtime, Disp: 90 tablet, Rfl: 3     UNABLE TO FIND, MEDICATION NAME: Prednisolone Acetate, Gatifloxacin and Bromfenac 1/0.5/0.075% - instill one drop in the surgery eye three times daily beginning 2 days before surgery, Disp: , Rfl:      VITAMIN D, CHOLECALCIFEROL, PO, Take 1,000 Units by mouth daily., Disp: , Rfl:   No current facility-administered medications for this visit.     Facility-Administered Medications Ordered in Other Visits:      ceFAZolin (ANCEF) 1 g vial to attach to IVPB, , , PRN, Stock, Rojelio, APRN CRNA, 2 g at 05/16/13 1206     ALLERGIES:  No Known Allergies     SOCIAL HISTORY:   Social History     Social History     Marital status:   "    Spouse name: Sri     Number of children: 2     Years of education: N/A     Occupational History      None      [employer]     Social History Main Topics     Smoking status: Never Smoker     Smokeless tobacco: Never Used     Alcohol use Yes      Comment: occ     Drug use: No     Sexual activity: Not Currently     Partners: Female     Other Topics Concern     Parent/Sibling W/ Cabg, Mi Or Angioplasty Before 65f 55m? No     Social History Narrative        FAMILY HISTORY:   Family History   Problem Relation Age of Onset     Breast Cancer Mother      Hypertension Mother      Thyroid Disease Mother      Depression Mother      Cancer - colorectal Father      Thyroid Disease Father      Depression Father      HEART DISEASE Maternal Grandmother      CHF     Circulatory Paternal Grandmother      Cancer Paternal Grandfather      Diabetes Brother      Diabetes Brother         EXAM:Vitals: /74  Ht 5' 5\" (1.651 m)  Wt 237 lb (107.5 kg)  BMI 39.44 kg/m2  BMI= Body mass index is 39.44 kg/(m^2).    A1C: 9.0 (6/2018)    General appearance: Patient is alert and fully cooperative with history & exam.  No sign of distress is noted during the visit.     Psychiatric: Affect is pleasant & appropriate.  Patient appears motivated to improve health.     Respiratory: Breathing is regular & unlabored while sitting.     HEENT: Hearing is intact to spoken word.  Speech is clear.  No gross evidence of visual impairment that would impact ambulation.     Dermatologic: Skin is intact to both lower extremities without significant lesions, rash or abrasion.  No paronychia or evidence of soft tissue infection is noted.     Vascular: DP & PT pulses are intact & regular bilaterally. edema and varicosities noted bilateral.  CFT and skin temperature is normal to both lower extremities.     Neurologic: Lower extremity sensation is diminished to both feet.      Musculoskeletal: Patient is ambulatory without assistive device or brace. "  Decrease arch height. Minimal pain on palpation to the sinus tarsi left ankle. Muscle strength is 5/5 without pain to both feet.     Radiographs: no fractures noted. Arthritic changes to midfoot. Decrease calcaneal inclination angle.      ASSESSMENT:    Left foot pain  Diabetic polyneuropathy associated with type 2 diabetes mellitus (H)  Morbid obesity due to excess calories (H)  Edema of both feet     PLAN:  Reviewed patient's chart in epic. Talked about diabetes and the feet. Concerned that he is not checking his blood sugars. His pain could be from neuropathy. Will get xray to assess for possible fractures although not as likely. Will call him with those results. Recommend topical anti inflammatories, was given order for this. Also was given order for compression socks.        Tena Mathis DPM, Podiatry/Foot and Ankle Surgery    Weight management plan: Patient was referred to their PCP to discuss a diet and exercise plan.    Patient to follow up with Primary Care provider regarding elevated blood pressure.        Again, thank you for allowing me to participate in the care of your patient.        Sincerely,        Tena Mathis DPM, Podiatry/Foot and Ankle Surgery

## 2018-11-28 DIAGNOSIS — M79.672 LEFT FOOT PAIN: ICD-10-CM

## 2018-11-28 DIAGNOSIS — R60.0 EDEMA OF BOTH FEET: ICD-10-CM

## 2018-11-28 DIAGNOSIS — E66.01 MORBID OBESITY DUE TO EXCESS CALORIES (H): ICD-10-CM

## 2018-11-28 DIAGNOSIS — E11.42 DIABETIC POLYNEUROPATHY ASSOCIATED WITH TYPE 2 DIABETES MELLITUS (H): ICD-10-CM

## 2018-11-29 NOTE — TELEPHONE ENCOUNTER
90 day supply request, QTY 300g       Disp Refills Start End KAMERON   diclofenac (VOLTAREN) 1 % topical gel 100 g 1 11/27/2018  --   Sig: Apply 4 grams to knees or 2 grams to hands four times daily using enclosed dosing card.

## 2018-12-14 ENCOUNTER — OFFICE VISIT (OUTPATIENT)
Dept: ENDOCRINOLOGY | Facility: CLINIC | Age: 58
End: 2018-12-14
Payer: COMMERCIAL

## 2018-12-14 VITALS
BODY MASS INDEX: 39.21 KG/M2 | DIASTOLIC BLOOD PRESSURE: 83 MMHG | HEART RATE: 66 BPM | WEIGHT: 235.6 LBS | OXYGEN SATURATION: 99 % | TEMPERATURE: 97.5 F | SYSTOLIC BLOOD PRESSURE: 148 MMHG

## 2018-12-14 DIAGNOSIS — E03.4 HYPOTHYROIDISM DUE TO ACQUIRED ATROPHY OF THYROID: ICD-10-CM

## 2018-12-14 DIAGNOSIS — E11.40 TYPE 2 DIABETES MELLITUS WITH DIABETIC NEUROPATHY, WITH LONG-TERM CURRENT USE OF INSULIN (H): Primary | ICD-10-CM

## 2018-12-14 DIAGNOSIS — Z79.4 TYPE 2 DIABETES MELLITUS WITH DIABETIC NEUROPATHY, WITH LONG-TERM CURRENT USE OF INSULIN (H): Primary | ICD-10-CM

## 2018-12-14 LAB
HBA1C MFR BLD: 11.8 % (ref 0–5.6)
TSH SERPL DL<=0.005 MIU/L-ACNC: 2.19 MU/L (ref 0.4–4)

## 2018-12-14 PROCEDURE — 84443 ASSAY THYROID STIM HORMONE: CPT | Performed by: CLINICAL NURSE SPECIALIST

## 2018-12-14 PROCEDURE — 82043 UR ALBUMIN QUANTITATIVE: CPT | Performed by: CLINICAL NURSE SPECIALIST

## 2018-12-14 PROCEDURE — 83036 HEMOGLOBIN GLYCOSYLATED A1C: CPT | Performed by: CLINICAL NURSE SPECIALIST

## 2018-12-14 PROCEDURE — 99214 OFFICE O/P EST MOD 30 MIN: CPT | Performed by: CLINICAL NURSE SPECIALIST

## 2018-12-14 PROCEDURE — 99207 C FOOT EXAM  NO CHARGE: CPT | Performed by: CLINICAL NURSE SPECIALIST

## 2018-12-14 PROCEDURE — 36415 COLL VENOUS BLD VENIPUNCTURE: CPT | Performed by: CLINICAL NURSE SPECIALIST

## 2018-12-14 RX ORDER — FLASH GLUCOSE SCANNING READER
1 EACH MISCELLANEOUS CONTINUOUS
Qty: 1 DEVICE | Refills: 0 | Status: SHIPPED | OUTPATIENT
Start: 2018-12-14 | End: 2023-01-01

## 2018-12-14 RX ORDER — FLASH GLUCOSE SENSOR
1 KIT MISCELLANEOUS
Qty: 2 EACH | Refills: 11 | Status: SHIPPED | OUTPATIENT
Start: 2018-12-14 | End: 2020-01-21

## 2018-12-14 NOTE — PATIENT INSTRUCTIONS
Increase Lantus to 30 units.    Watch the carbs in your diet, try to limit carbs to no more than 45-60 grams per meal which is equal to 3-4 servings of carb per meal.    Start using the Nina - upgrade to the 14-day sensor/ once you use up your current supply of 10-day sensors.  Remember to go online to get the code for a free 14-day .    I would like you to follow up with diabetes ed in 1 month to review Nina download and make further insulin dose adjustments.     Follow up with me in 3 months.

## 2018-12-14 NOTE — PROGRESS NOTES
"Name: Steve \"Caio\" Cathy  Seen at the request of Lisa Pierre for Diabetes (Last seen 9/7/2018).  HPI:  Steve Morgan is a 58 year old male who presents for the evaluation/management of:    1. Type 2 DM:  Orginally diagnosed at the age of 33 or 38.  Was prediabetic prior, was not particularly symptomatic at the time, was noted on routine lab work    Current Regimen: Metformin 1000 mg bid, glipzide 20 mg q am, Lantus 26 units qd (started 5/2017)     BS checks: None - not wearing Nina since April.    Meter Download:     Elevated blood sugar due to continued dietary indiscretions, states he hasn't been watching his diet as carefully.  Likes to snack between meals, buys snacks at the gas station.      Complications:   Diabetes Complications  Description / Detail    Diabetic Retinopathy  No retinopathy, early cataract, last exam 7/2018.  Cataract surgery 9/2018   CAD / PAD  No   Neuropathy  Yes + diabetic neuropathy: numbness hands and feet.  Saw podiatry, Dr. Mathis, 1/27/2018- using diabetic shoes   Nephropathy / Microalbuminuria  No   Gastroparesis  No   Hypoglycemia [place]  Not sure, gets 'kind of dizzy' when blood sugar is low but reports history of low blood sugars without symptoms     2. Hypertension: Blood Pressure today:   BP Readings from Last 3 Encounters:   12/14/18 148/83   11/27/18 126/74   11/19/18 122/76   .  Blood pressure medications include atenolol 25 mg qd and losartan 50 mg qd  .  Takes medications everyday without forgetting a dose.  Denies feeling lightheaded or dizzy.   3. Hyperlipidemia: Takes simvastatin 20 mg for lipid control.  Denies muscle aches of pains.       4. Prevention:  Flu Shot- 9/2018  Pneumovax- 2012  Opthalmology-Yes: annually  Dental-Yes: twice a year  ASA-Yes, 81 mg qd   Smoking- No  5.  Hypothyroidism. Currently treated with levoxyl 125 mcg daily.  This dose was decreased 3 months ago due to low TSH and elevated T4. Takes the levothyroxine first thing in the " morning and waits 30+ minutes before eating breakfast.    PMH/PSH:  Past Medical History:   Diagnosis Date     Cellulitis and abscess of trunk 6/27/2017     Depression      Hyperlipidemia LDL goal <100 10/31/2010     Hypertension goal BP (blood pressure) < 140/90 3/17/2011     Hypothyroidism 1/12/2010     Morbid obesity due to excess calories (H) 1/12/2010     Neuropathy in diabetes (H) 1/12/2010     Obesity 1/12/2010     Sleep apnea 1/12/2010    CPAP     Type 2 diabetes, HbA1C goal < 8% (H) 3/8/2011     Past Surgical History:   Procedure Laterality Date     COLONOSCOPY       GENITOURINARY SURGERY      surg for undescended testicle     REPAIR HAMMER TOE BILATERAL  5/16/2013    Procedure: REPAIR HAMMER TOE BILATERAL;  Flexor Tenotomy Toes 2,3,4,5 Bilateral Feet;  Surgeon: Saad Bangura DPM;  Location: [facility] OR     Family Hx:  Family History   Problem Relation Age of Onset     Breast Cancer Mother      Hypertension Mother      Thyroid Disease Mother      Depression Mother      Cancer - colorectal Father      Thyroid Disease Father      Depression Father      Heart Disease Maternal Grandmother         CHF     Circulatory Paternal Grandmother      Cancer Paternal Grandfather      Diabetes Brother      Diabetes Brother      Thyroid disease: Yes: mother         DM2: Yes: one brother with type 1 diabetes and one brother with type 2 diabetes, paternal aunt with DM2         Autoimmune: DM1, SLE, RA, Vitiligo Yes: brother with DM1    Social Hx:  Social History     Socioeconomic History     Marital status:      Spouse name: Sri     Number of children: 2     Years of education: Not on file     Highest education level: Not on file   Social Needs     Financial resource strain: Not on file     Food insecurity - worry: Not on file     Food insecurity - inability: Not on file     Transportation needs - medical: Not on file     Transportation needs - non-medical: Not on file   Occupational History     Occupation: billing  analyst     Employer: NONE      Comment: [employer]   Tobacco Use     Smoking status: Never Smoker     Smokeless tobacco: Never Used   Substance and Sexual Activity     Alcohol use: Yes     Comment: occ     Drug use: No     Sexual activity: Not Currently     Partners: Female   Other Topics Concern     Parent/sibling w/ CABG, MI or angioplasty before 65F 55M? No   Social History Narrative     Not on file          MEDICATIONS:  has a current medication list which includes the following prescription(s): freestyle juan 14 day reader, freestyle juan 14 day sensor, insulin glargine, ammonium lactate, aspirin, atenolol, blood glucose monitoring, blood glucose monitoring, blood glucose monitoring, blood glucose monitoring, bupropion, calcium carb-cholecalciferol, citalopram, cyanocobalamin, diclofenac, ferrous sulfate, finasteride, glipizide, hydrocortisone, insulin glargine, insulin pen needle, levothyroxine, losartan, metformin, multi-vitamin, omeprazole-sodium bicarbonate, order for dme, order for dme, order for dme, order for dme, simvastatin, UNABLE TO FIND, and cholecalciferol, and the following Facility-Administered Medications: cefazolin.    ROS     ROS: 10 point ROS neg other than the symptoms noted above in the HPI.    Physical Exam   VS: /83   Pulse 66   Temp 97.5  F (36.4  C) (Oral)   Wt 106.9 kg (235 lb 9.6 oz)   SpO2 99%   BMI 39.21 kg/m    GENERAL: AXOX3, NAD, well dressed, answering questions appropriately, appears stated age.  HEENT: no exopthalmous, no proptosis, no lig lag, no retraction  CV: RRR, no rubs, gallops, no murmurs  LUNGS: CTAB, no wheezes, rales, or ronchi  EXTREMITIES: trace edema at the ankles, feet with slightly diminished pulses, 10-gram plantar sensation absent, scattered calluses bilaterally, no open lesion.  NEUROLOGY: CN grossly intact, no tremors  MSK: grossly intact  SKIN: no rashes, no lesions    LABS:  A1c:   Component      Latest Ref Rng & Units 9/7/2017 12/8/2017  12/14/2018   Hemoglobin A1C      0 - 5.6 % 8.5 (H) 9.0 (H) 11.8 (H)     Basic Metabolic Panel:  !COMPREHENSIVE Latest Ref Rng & Units 9/18/2018   SODIUM 133 - 144 mmol/L 133   POTASSIUM 3.4 - 5.3 mmol/L 5.3   CHLORIDE 94 - 109 mmol/L 99   BUN 7 - 30 mg/dL 15   Creatinine 0.66 - 1.25 mg/dL 1.08   Glucose 70 - 99 mg/dL 218 (H)   ANION GAP 3 - 14 mmol/L 9   CALCIUM 8.5 - 10.1 mg/dL 9.2   ALBUMIN 3.4 - 5.0 g/dL 3.9     LFTS/Cholesterol Panel:  !LIPID/HEPATIC Latest Ref Rng & Units 9/18/2018   CHOLESTEROL <200 mg/dL 188   TRIGLYCERIDES <150 mg/dL 219 (H)   HDL CHOLESTEROL >39 mg/dL 30 (L)   LDL CHOLESTEROL, CALCULATED <100 mg/dL 114 (H)   VLDL-CHOLESTEROL 0 - 30 mg/dL    NON HDL CHOLESTEROL <130 mg/dL 158 (H)   CHOLESTEROL/HDL RATIO 0.0 - 5.0    AST 0 - 45 U/L 23   ALT 0 - 70 U/L 30     Thyroid Function:   !THYROID Latest Ref Rng & Units 10/19/2018 9/7/2018   TSH 0.40 - 4.00 mU/L 0.30 (L) 0.06 (L)   T4 FREE 0.76 - 1.46 ng/dL 1.46 1.61 (H)     Component    Latest Ref Rng & Units 10/19/2018   Thyroid Peroxidase Antibody    <35 IU/mL 11   Thyroglobulin Antibody    <40 IU/mL <20   Thyroid Stim Immunog    <=1.3 TSI index <1.0     Urine MicroAlbumin:  Component    Latest Ref Rng & Units 12/8/2017   Creatinine Urine    mg/dL 39   Albumin Urine mg/L    17   Albumin Urine mg/g Cr    0 - 17 mg/g Cr 43.15 (H)     Vitamin D:    All pertinent notes, labs, and images personally reviewed by me.     A/P  Mr.Walter Morgan is a 57 year old here for the evaluation/management of diabetes:    1. DM2 - Uncontrolled.  A1c increased to 11.8%!    Diabetes is complicated by neuropathy  No blood sugar data for review today.  C/o limited use of his hands (neuropathy + tremor - both significant) and increasing difficulty with ADL's - referral to [facility] for hand therapy previously provided but hasn't scheduled yet.  Can't test his blood sugars using a meter due to the above.  Needs to start wearing the Nina.  Plan to upgrade to 14-day sensor  Increase  Lantus to 30 units every day  Continue metformin 1000 mg bid and glipizide 20 mg q am.  Follow up in one month with diabetes ed to review blood sugars, review diet recommendations, and make insulin dose adjustments.      There is some variability among people, most will usually develop symptoms suggestive of hypoglycemia when blood glucose levels are lowered to the mid 60's. The first set of symptoms are called adrenergic. Patients may experience any of the following nervousness, sweating, intense hunger, trembling, weakness, palpitations, and difficulty speaking.   The acute management of hypoglycemia involves the rapid delivery of a source of easily absorbed sugar. Regular soda, juice, lifesavers, table sugar, are good options. 15 grams of glucose is the dose that is given, followed by an assessment of symptoms and a blood glucose check if possible. If after 10 minutes there is no improvement, another 10-15 grams should be given. This can be repeated up to three times. The equivalency of 10-15 grams of glucose (approximate servings) are: 3-5 hard candies, 3 teaspoons of sugar, or 1/2 cup of regular soda or juice.      2. Hypothyroidism.  Currently treated with Levoxyl (KAMERON) 125 mcg/day.  Will obtain TFT's today and adjust levoxyl dose if indicated.    3.  Hypertension - Variable.      4. Hyperlipidemia - On statin therapy.    Labs ordered today:   Orders Placed This Encounter   Procedures     FOOT EXAM     Hemoglobin A1c     Albumin Random Urine Quantitative with Creat Ratio     TSH with free T4 reflex     DIABETES EDUCATOR REFERRAL       Radiology/Consults ordered today: C FOOT EXAM  NO CHARGE  DIABETES EDUCATOR REFERRAL    All questions were answered.  The patient indicates understanding of the above issues and agrees with the plan set forth.  Total face to face time greater than or equal to 25 minutes.       Follow-up:  See diabetes ed 1 month to review Nina   3 months with mahendra Butler  NP  Endocrinology  [facility]  CC: Lisa Pierre

## 2018-12-16 LAB
CREAT UR-MCNC: 32 MG/DL
MICROALBUMIN UR-MCNC: 20 MG/L
MICROALBUMIN/CREAT UR: 63.47 MG/G CR (ref 0–17)

## 2018-12-19 NOTE — RESULT ENCOUNTER NOTE
Caio,  Your urine protein was slightly elevated.  Your thyroid test was normal.  Here's a copy of the results for your records.  Diana Butler NP  Endocrinology

## 2018-12-20 DIAGNOSIS — E03.4 HYPOTHYROIDISM DUE TO ACQUIRED ATROPHY OF THYROID: ICD-10-CM

## 2018-12-20 RX ORDER — LEVOTHYROXINE SODIUM 125 UG/1
125 TABLET ORAL DAILY
Qty: 30 TABLET | Refills: 11 | Status: SHIPPED | OUTPATIENT
Start: 2018-12-20 | End: 2019-08-29

## 2018-12-20 NOTE — TELEPHONE ENCOUNTER
"Requested Prescriptions   Pending Prescriptions Disp Refills     levothyroxine (LEVOXYL) 125 MCG tablet    Last Written Prescription Date:  10/29/18  Last Fill Quantity: 30 tablets,  # refills: 1   Last office visit: 12/14/2018 with prescribing provider:  Luke   Future Office Visit:    90 day requested 30 tablet 1     Sig: Take 1 tablet (125 mcg) by mouth daily Dispense name brand Levoxyl, no generics    Thyroid Protocol Passed - 12/20/2018 10:04 AM       Passed - Patient is 12 years or older       Passed - Recent (12 mo) or future (30 days) visit within the authorizing provider's specialty    Patient had office visit in the last 12 months or has a visit in the next 30 days with authorizing provider or within the authorizing provider's specialty.  See \"Patient Info\" tab in inbasket, or \"Choose Columns\" in Meds & Orders section of the refill encounter.             Passed - Normal TSH on file in past 12 months    Recent Labs   Lab Test 12/14/18  0924   TSH 2.19              "

## 2018-12-31 DIAGNOSIS — E03.4 HYPOTHYROIDISM DUE TO ACQUIRED ATROPHY OF THYROID: ICD-10-CM

## 2018-12-31 NOTE — TELEPHONE ENCOUNTER
Last Written Prescription Date: 12/20/18    Last Fill Quantity: 30,  # refills: 11   Last office visit: 12/14/2018 with prescribing provider:  Diana Butler    Future Office Visit:   Next 5 appointments (look out 90 days)    Mar 21, 2019  9:00 AM CDT  [facility] Visit with BRIDGET Payne CNP  [facility] [address]  [facility] [id number] [phone]         Requested Prescriptions   Pending Prescriptions Disp Refills     levothyroxine (LEVOXYL) 125 MCG tablet 30 tablet 11     Sig: Take 1 tablet (125 mcg) by mouth daily Dispense name brand Levoxyl, no generics    There is no refill protocol information for this order        Deja Grimes/ANUJ

## 2019-01-03 RX ORDER — LEVOTHYROXINE SODIUM 125 UG/1
125 TABLET ORAL DAILY
Qty: 30 TABLET | Refills: 11 | OUTPATIENT
Start: 2019-01-03

## 2019-01-03 NOTE — TELEPHONE ENCOUNTER
duplicate  E-Prescribing Status: Receipt confirmed by pharmacy (12/20/2018  1:17 PM CST)  Gen Boyle RN, BSN

## 2019-02-12 DIAGNOSIS — E11.40 TYPE 2 DIABETES MELLITUS WITH DIABETIC NEUROPATHY (H): ICD-10-CM

## 2019-02-12 NOTE — TELEPHONE ENCOUNTER
"Requested Prescriptions   Pending Prescriptions Disp Refills     insulin pen needle (B-D U/F) 31G X 5 MM miscellaneous [Pharmacy Med Name: BD UF MINI PEN NEEDLE [id number]]  Last Written Prescription Date:  03/08/2018  Last Fill Quantity: 100 each,  # refills: 6   Last office visit: No previous visit found with prescribing provider:     Lisa Pierre PA-C    09/18/2018     Future Office Visit:   Next 5 appointments (look out 90 days)    Mar 21, 2019  9:00 AM CDT  Return Visit with BRIDGET Payne CNP  [facility] [address]  [facility] [address]  [phone]           2     Sig: USE 1 DAILY OR AS DIRECTED.    Diabetic Supplies Protocol Passed - 2/12/2019 11:43 AM       Passed - Medication is active on med list       Passed - Patient is 18 years of age or older       Passed - Recent (6 mo) or future (30 days) visit within the authorizing provider's specialty    Patient had office visit in the last 6 months or has a visit in the next 30 days with authorizing provider.  See \"Patient Info\" tab in inbasket, or \"Choose Columns\" in Meds & Orders section of the refill encounter.              "

## 2019-02-12 NOTE — TELEPHONE ENCOUNTER
Last OV 12.14.18 Diana Butler    Prescription approved per [facility] Refill Protocol  Laura Pizarro RN BS

## 2019-02-13 ENCOUNTER — TELEPHONE (OUTPATIENT)
Dept: ENDOCRINOLOGY | Facility: CLINIC | Age: 59
End: 2019-02-13

## 2019-02-13 NOTE — TELEPHONE ENCOUNTER
Fax from [facility] that he was given temporary supply of Levoxyl and need PA or to change med.  I am having trouble finding reason changed to brand name.  Looks like went to brand name 12/13/1Sent to provider.  Please advise.  Daisy Bradley, LUCHO    Fax is from insurance not pharmacy so it did not list normal PA information.       Phone: [phone]     12/8/2017  [facility]   Diana Butler APRN CNP   Nurse Practitioner - Family   Type 2 diabetes mellitus with diabetic neuropathy, with long-term current use of insulin (H) +3 more   Dx   Diabetes   Reason for Visit    Progress Notes   Diana Butler APRN CNP (Nurse Practitioner)     Clinical Nurse Specialist      Please call -  Caio,  Your thyroid levels are a little high.  I am decreasing your levothyroxine dose to 175 mcg/day.  We'll recheck again at your next appointment in 3 months.  Diana Butler NP  Endocrinology

## 2019-02-13 NOTE — TELEPHONE ENCOUNTER
PA - Dx - acquired hypothyroidism due to atrophy   Medical justification for name brand Levoxyl - hypothyroidism uncontrolled on generic levothyroxine since 2015 - thyroid function tests chronically abnormal and uncontrolled on generic levothyroxine despite frequent dose adjustments.  Most recent TSH in normal range, hypothyroidism now controlled since changing to name brand Levoxyl.    Diana Butler NP  Endocrinology

## 2019-02-18 NOTE — TELEPHONE ENCOUNTER
Prior Authorization Approval    Authorization Effective Date: 1/1/2019  Authorization Expiration Date: 2/18/2020  Medication: Levoxyl  Approved Dose/Quantity:    Reference #:     Insurance Company: [employer] - Phone [phone] Fax [phone]  Expected CoPay:       CoPay Card Available:      Foundation Assistance Needed:    Which Pharmacy is filling the prescription (Not needed for infusion/clinic administered): [facility]/PHARMACY #[id number] - [place], [place] - [address].  Pharmacy Notified: Yes  Patient Notified: Yes

## 2019-02-18 NOTE — TELEPHONE ENCOUNTER
Central Prior Authorization Team   Phone: [phone]      PA Initiation    Medication: Levoxyl  Insurance Company: [employer] - Phone [phone] Fax [phone]  Pharmacy Filling the Rx: [facility]/PHARMACY #[id number] - [place], [place] - [address].  Filling Pharmacy Phone: [phone]  Filling Pharmacy Fax:    Start Date: 2/18/2019

## 2019-03-26 ENCOUNTER — TELEPHONE (OUTPATIENT)
Dept: FAMILY MEDICINE | Facility: CLINIC | Age: 59
End: 2019-03-26

## 2019-03-26 NOTE — TELEPHONE ENCOUNTER
Pt is scheduled for his Hep A travel vaccine 4/1/19, he states his wife was given malaria prescriptions (seen outside of [place]) and is asking if he can have his own Rx as well.     Please review and advise. Call pt at [phone]     Austyn Rosado   03/26/19 2:23 PM      Asthma

## 2019-03-27 NOTE — TELEPHONE ENCOUNTER
His trip that I saw him for has already occurred. Please call patient to ask where he is going. If it is a new location (saw him for [place]), he will need a new travel visit. Also, he isn't due for his second Hep A shot until 4/14/19 and if given early will not count towards completion of the series.    London Escalera PA-C on 3/27/2019 at 10:35 AM

## 2019-03-28 NOTE — TELEPHONE ENCOUNTER
Pt calls, going to [place] again, this time [place], previous was [place], rescheduled hep A, pt wants appointment with [facility] for travel, wife received malaria rx as recommended  Lucía Bond RN, BSN  Message handled by Nurse Triage.

## 2019-04-01 ENCOUNTER — OFFICE VISIT (OUTPATIENT)
Dept: FAMILY MEDICINE | Facility: CLINIC | Age: 59
End: 2019-04-01
Payer: MEDICARE

## 2019-04-01 VITALS
DIASTOLIC BLOOD PRESSURE: 89 MMHG | HEART RATE: 87 BPM | SYSTOLIC BLOOD PRESSURE: 140 MMHG | OXYGEN SATURATION: 98 % | WEIGHT: 223 LBS | TEMPERATURE: 97.6 F | RESPIRATION RATE: 16 BRPM | BODY MASS INDEX: 37.11 KG/M2

## 2019-04-01 DIAGNOSIS — Z71.84 ENCOUNTER FOR COUNSELING FOR TRAVEL: Primary | ICD-10-CM

## 2019-04-01 PROCEDURE — 99401 PREV MED CNSL INDIV APPRX 15: CPT | Performed by: PHYSICIAN ASSISTANT

## 2019-04-01 RX ORDER — AZITHROMYCIN 500 MG/1
500 TABLET, FILM COATED ORAL DAILY
Qty: 3 TABLET | Refills: 0 | Status: SHIPPED | OUTPATIENT
Start: 2019-04-01 | End: 2019-04-17

## 2019-04-01 RX ORDER — ATOVAQUONE AND PROGUANIL HYDROCHLORIDE 250; 100 MG/1; MG/1
1 TABLET, FILM COATED ORAL DAILY
Qty: 16 TABLET | Refills: 0 | Status: SHIPPED | OUTPATIENT
Start: 2019-04-01 | End: 2019-04-17

## 2019-04-01 ASSESSMENT — ANXIETY QUESTIONNAIRES
1. FEELING NERVOUS, ANXIOUS, OR ON EDGE: NOT AT ALL
IF YOU CHECKED OFF ANY PROBLEMS ON THIS QUESTIONNAIRE, HOW DIFFICULT HAVE THESE PROBLEMS MADE IT FOR YOU TO DO YOUR WORK, TAKE CARE OF THINGS AT HOME, OR GET ALONG WITH OTHER PEOPLE: SOMEWHAT DIFFICULT
6. BECOMING EASILY ANNOYED OR IRRITABLE: MORE THAN HALF THE DAYS
GAD7 TOTAL SCORE: 5
5. BEING SO RESTLESS THAT IT IS HARD TO SIT STILL: NOT AT ALL
2. NOT BEING ABLE TO STOP OR CONTROL WORRYING: SEVERAL DAYS
3. WORRYING TOO MUCH ABOUT DIFFERENT THINGS: SEVERAL DAYS
7. FEELING AFRAID AS IF SOMETHING AWFUL MIGHT HAPPEN: NOT AT ALL

## 2019-04-01 ASSESSMENT — PATIENT HEALTH QUESTIONNAIRE - PHQ9
5. POOR APPETITE OR OVEREATING: SEVERAL DAYS
SUM OF ALL RESPONSES TO PHQ QUESTIONS 1-9: 11

## 2019-04-01 NOTE — PATIENT INSTRUCTIONS
See travel packet provided  Recommend ultrathon, pepto bismol and imodium  Also bring hand  and sun screen with you.  Safe Travels

## 2019-04-02 ENCOUNTER — OFFICE VISIT (OUTPATIENT)
Dept: PODIATRY | Facility: CLINIC | Age: 59
End: 2019-04-02
Payer: MEDICARE

## 2019-04-02 VITALS
HEIGHT: 65 IN | SYSTOLIC BLOOD PRESSURE: 126 MMHG | WEIGHT: 223 LBS | BODY MASS INDEX: 37.15 KG/M2 | DIASTOLIC BLOOD PRESSURE: 78 MMHG

## 2019-04-02 DIAGNOSIS — E11.42 DIABETIC POLYNEUROPATHY ASSOCIATED WITH TYPE 2 DIABETES MELLITUS (H): Primary | ICD-10-CM

## 2019-04-02 DIAGNOSIS — L84 PRE-ULCERATIVE CALLUSES: ICD-10-CM

## 2019-04-02 PROCEDURE — 99213 OFFICE O/P EST LOW 20 MIN: CPT | Performed by: PODIATRIST

## 2019-04-02 RX ORDER — AMMONIUM LACTATE 12 G/100G
CREAM TOPICAL DAILY
Qty: 385 G | Refills: 2 | Status: SHIPPED | OUTPATIENT
Start: 2019-04-02 | End: 2020-03-24

## 2019-04-02 ASSESSMENT — MIFFLIN-ST. JEOR: SCORE: 1743.4

## 2019-04-02 ASSESSMENT — ANXIETY QUESTIONNAIRES: GAD7 TOTAL SCORE: 5

## 2019-04-02 NOTE — LETTER
4/2/2019         RE: Steve Morgan  [address]  [facility] [address]        Dear Colleague,    Thank you for referring your patient, Steve Morgan, to the [facility]. Please see a copy of my visit note below.    PATIENT HISTORY:    Steve Morgan is a 59 year old male who presents to clinic for lesion to left foot. Is diabetic and wants to make sure it is not an ulcer. No drainage to area. Has been there for a month or so. Denies injury.  Denies fever, chills, nausa.     Review of Systems:  Patient denies fever, chills, rash, wound, stiffness, limping, weakness, heart burn, blood in stool, chest pain with activity, calf pain when walking, shortness of breath with activity, chronic cough, easy bleeding/bruising, swelling of ankles, excessive thirst, fatigue, depression, anxiety.  Patient admits to [facility].     PAST MEDICAL HISTORY:   Past Medical History:   Diagnosis Date     Cellulitis and abscess of trunk 6/27/2017     Depression      Hyperlipidemia LDL goal <100 10/31/2010     Hypertension goal BP (blood pressure) < 140/90 3/17/2011     Hypothyroidism 1/12/2010     Morbid obesity due to excess calories (H) 1/12/2010     Neuropathy in diabetes (H) 1/12/2010     Obesity 1/12/2010     Sleep apnea 1/12/2010    CPAP     Type 2 diabetes, HbA1C goal < 8% (H) 3/8/2011        PAST SURGICAL HISTORY:   Past Surgical History:   Procedure Laterality Date     COLONOSCOPY       GENITOURINARY SURGERY      surg for undescended testicle     REPAIR HAMMER TOE BILATERAL  5/16/2013    Procedure: REPAIR HAMMER TOE BILATERAL;  Flexor Tenotomy Toes 2,3,4,5 Bilateral Feet;  Surgeon: Saad Bangura DPM;  Location:  OR        MEDICATIONS:   Current Outpatient Medications:      ammonium lactate (LAC-HYDRIN) 12 % cream, Apply topically 2 times daily as needed for dry skin, Disp: 385 g, Rfl: 3     ASPIRIN 81 MG OR TABS, ONE DAILY, Disp: 100, Rfl: 3     atenolol (TENORMIN) 25 MG tablet, Take 1 tablet (25  mg) by mouth daily, Disp: 90 tablet, Rfl: 0     atovaquone-proguanil (MALARONE) 250-100 MG tablet, Take 1 tablet by mouth daily Start 2 days before travel and continue 7 days after return., Disp: 16 tablet, Rfl: 0     azithromycin (ZITHROMAX) 500 MG tablet, Take 1 tablet (500 mg) by mouth daily for 3 days For traveler's diarrhea., Disp: 3 tablet, Rfl: 0     blood glucose (ONE TOUCH VERIO IQ) test strip, Use to test blood sugars 2 times daily or as directed., Disp: 100 strip, Rfl: 0     blood glucose monitoring (KERLINE CONTOUR NEXT) test strip, Use to test blood sugar 3 times daily or as directed., Disp: 100 strip, Rfl: 11     blood glucose monitoring (KERLINE MICROLET) lancets, Use to test blood sugar 3 times daily or as directed., Disp: 100 each, Rfl: 11     blood glucose monitoring (ONE TOUCH DELICA) lancets, Use to test blood sugars 2 times daily or as directed., Disp: 1 Box, Rfl: prn     buPROPion (WELLBUTRIN XL) 300 MG 24 hr tablet, Take 1 tablet (300 mg) by mouth every morning, Disp: 90 tablet, Rfl: 1     Calcium Carb-Cholecalciferol (CALCIUM 600 + D PO), Take 1 tablet by mouth daily, Disp: , Rfl:      citalopram (CELEXA) 20 MG tablet, TAKE 1 TABLET (20 MG) BY MOUTH DAILY, Disp: 90 tablet, Rfl: 1     Continuous Blood Gluc  (FREESTYLE GIULIANA 14 DAY READER) SIENA, 1 each continuous, Disp: 1 Device, Rfl: 0     Continuous Blood Gluc Sensor (FREESTYLE GIULIANA 14 DAY SENSOR) MISC, 1 each every 14 days, Disp: 2 each, Rfl: 11     Cyanocobalamin (VITAMIN B 12 PO), Take 250 mcg by mouth daily, Disp: , Rfl:      diclofenac (VOLTAREN) 1 % topical gel, Apply 4 grams to knees or 2 grams to hands four times daily using enclosed dosing card., Disp: 100 g, Rfl: 3     ferrous sulfate 325 (65 FE) MG tablet, Take 1 tablet by mouth daily (with breakfast)., Disp: , Rfl:      finasteride (PROSCAR) 5 MG tablet, Take 1 tablet (5 mg) by mouth daily, Disp: 90 tablet, Rfl: 0     glipiZIDE (GLUCOTROL XL) 10 MG 24 hr tablet, Take 2  tablets (20 mg) by mouth every morning, Disp: 180 tablet, Rfl: 1     hydrocortisone (WESTCORT) 0.2 % cream, Apply topically 2 times daily as needed Apply sparingly to affected area, BID., Disp: 45 g, Rfl: 1     insulin glargine (LANTUS SOLOSTAR PEN) 100 UNIT/ML pen, 30 units qd.  Increase as directed to max dose of 45 units qd., Disp: 45 mL, Rfl: 1     insulin glargine (LANTUS SOLOSTAR) 100 UNIT/ML pen, Inject 26 Units Subcutaneous daily, Disp: 15 mL, Rfl: 2     insulin pen needle (B-D U/F) 31G X 5 MM miscellaneous, USE 1 DAILY OR AS DIRECTED., Disp: 100 each, Rfl: 2     levothyroxine (LEVOXYL) 125 MCG tablet, Take 1 tablet (125 mcg) by mouth daily Dispense name brand Levoxyl, no generics, Disp: 30 tablet, Rfl: 11     losartan (COZAAR) 50 MG tablet, Take 1 tablet (50 mg) by mouth daily, Disp: 90 tablet, Rfl: 3     metFORMIN (GLUCOPHAGE) 1000 MG tablet, TAKE 1 TABLET (1,000 MG) BY MOUTH 2 TIMES DAILY (WITH MEALS), Disp: 180 tablet, Rfl: 3     MULTI-VITAMIN OR TABS, 1 TABLET DAILY, Disp: 30, Rfl: 0     omeprazole-sodium bicarbonate (ZEGERID)  MG per capsule, Take 1 capsule by mouth every morning (before breakfast), Disp: , Rfl:      order for DME, Equipment being ordered: Please dispense up to 3 pairs of knee high compression stockings at 20-30 mmHg and one donning device if needed., Disp: 4 Device, Rfl: 0     order for DME, Equipment being ordered: Lt wrist splint, Disp: 1 Device, Rfl: 0     ORDER FOR DME, Equipment being ordered: four pronged cane, Disp: 1 Units, Rfl: 0     ORDER FOR DME, Equipment being ordered: single cane with rubber foot, Disp: 1 Units, Rfl: 0     simvastatin (ZOCOR) 20 MG tablet, Take 1 tablet (20 mg) by mouth At Bedtime, Disp: 90 tablet, Rfl: 3     UNABLE TO FIND, MEDICATION NAME: Prednisolone Acetate, Gatifloxacin and Bromfenac 1/0.5/0.075% - instill one drop in the surgery eye three times daily beginning 2 days before surgery, Disp: , Rfl:      VITAMIN D, CHOLECALCIFEROL, PO, Take  1,000 Units by mouth daily., Disp: , Rfl:      ALLERGIES:  No Known Allergies     SOCIAL HISTORY:   Social History     Socioeconomic History     Marital status:      Spouse name: Sri     Number of children: 2     Years of education: Not on file     Highest education level: Not on file   Occupational History     Occupation:      Employer: NONE      Comment: [employer]   Social Needs     Financial resource strain: Not on file     Food insecurity:     Worry: Not on file     Inability: Not on file     Transportation needs:     Medical: Not on file     Non-medical: Not on file   Tobacco Use     Smoking status: Never Smoker     Smokeless tobacco: Never Used   Substance and Sexual Activity     Alcohol use: Yes     Comment: occ     Drug use: No     Sexual activity: Not Currently     Partners: Female   Lifestyle     Physical activity:     Days per week: Not on file     Minutes per session: Not on file     Stress: Not on file   Relationships     Social connections:     Talks on phone: Not on file     Gets together: Not on file     Attends Faith service: Not on file     Active member of club or organization: Not on file     Attends meetings of clubs or organizations: Not on file     Relationship status: Not on file     Intimate partner violence:     Fear of current or ex partner: Not on file     Emotionally abused: Not on file     Physically abused: Not on file     Forced sexual activity: Not on file   Other Topics Concern     Parent/sibling w/ CABG, MI or angioplasty before 65F 55M? No   Social History Narrative     Not on file        FAMILY HISTORY:   Family History   Problem Relation Age of Onset     Breast Cancer Mother      Hypertension Mother      Thyroid Disease Mother      Depression Mother      Cancer - colorectal Father      Thyroid Disease Father      Depression Father      Heart Disease Maternal Grandmother         CHF     Circulatory Paternal Grandmother      Cancer Paternal Grandfather   "    Diabetes Brother      Diabetes Brother         EXAM:Vitals: /78   Ht 1.651 m (5' 5\")   Wt 101.2 kg (223 lb)   BMI 37.11 kg/m       A1C: 11.8 (12/2019)    General appearance: Patient is alert and fully cooperative with history & exam.  No sign of distress is noted during the visit.     Psychiatric: Affect is pleasant & appropriate.  Patient appears motivated to improve health.     Respiratory: Breathing is regular & unlabored while sitting.     HEENT: Hearing is intact to spoken word.  Speech is clear.  No gross evidence of visual impairment that would impact ambulation.     Dermatologic: pre ulcerative hyperkeratotic lesion to plantar medial left 1st metatarsal phalangeal joint. No open lesions or signs of infection.      Vascular: DP & PT pulses are intact & regular bilaterally.  No significant edema or varicosities noted.  CFT and skin temperature is normal to both lower extremities.     Neurologic: Lower extremity sensation is diminished to both feet.      Musculoskeletal: Patient is ambulatory without assistive device or brace.  Decrease arch height. Minimal pain on palpation to the sinus tarsi left ankle. Muscle strength is 5/5 without pain to both feet.     ASSESSMENT:    Diabetic polyneuropathy associated with type 2 diabetes mellitus (H)  Pre-ulcerative calluses     PLAN:  Reviewed patient's chart in epic. Talked about diabetes and her feet. Discussed causes of keratomas.  They are due to areas of increase friction.  Hammertoes can create these as they put more pressure to the metatarsal head.  Discussed treatments such as using foot file, pumice stone, metatarsal pads, orthotics, and not walking barefoot.     We discussed the cost structure of callus care if they were to come back and have it treated in the clinic if insurance does not cover it and explained that they would be billed. They were also provided information on places to get the callus treatment.    Discussed these could lead to " ulcers. Recommend lotion feet daily and using pumice stone.      Tena Mathis DPM, Podiatry/Foot and Ankle Surgery    Weight management plan: Patient was referred to their PCP to discuss a diet and exercise plan.    Recommended to Steve Morgan to follow up with Primary Care provider regarding elevated blood pressure.        Again, thank you for allowing me to participate in the care of your patient.        Sincerely,        Tena Mathis DPM, Podiatry/Foot and Ankle Surgery

## 2019-04-02 NOTE — PATIENT INSTRUCTIONS
Thank you for choosing [facility]!    DR. AKBAR'S CLINIC SCHEDULE  MONDAY AM - [place] TUESDAY - [place]   [address] [address] [address] [address] [place] [address]   [phone] / [phone] / [phone]       WEDNESDAY - [place] FRIDAY AM - [facility]   [address]   [place] [address] [place] [address] [address]   [phone] / [phone]       FRIDAY PM - [place] SCHEDULE SURGERY: [phone]   [address] BILLING QUESTIONS: [phone]   [address] [address] [address] AFTER HOURS: [phone]   [phone] / [phone] APPOINTMENTS: [phone]     Consumer Price Line (CPL) [phone]       CALLUS / CORNS / IPKs  When there is excessive friction or pressure on the skin, the body responds by making the skin thicker to protect the deeper structures from becoming exposed. While this works well to protect the deeper structures, the thickened skin can increase pressure and pain.    CALLUS: Flat, diffuse thickening are simple calluses and they are usually caused by friction. Often these are the result of rubbing on a shoe or going barefoot.    CORNS: Calluses with a central core between the toes are called corns. These result from prominent joints on adjacent toes rubbing together. Theses are a symptom of bone malalignment and will always recur unless the underlying bones are addressed surgically.    IPKs: Calluses with a central core on the ball of the foot are usually IPKs (intractable plantar keratosis). These are caused by excessive pressure from the metatarsals, the bones that make up the ball of the foot. Often one of these bones is too long or too prominent.  Again, these will always recur unless the underlying bone issue is addressed. There is no cure for these. They will either go away by themselves, recur, or more could develop.    ROUTINE MAINTENANCE  1. File them down  "with a pumice stone or callus file a couple times a week.   2. An electric callus removing device. Amope Pedi Perfect Electronic Pedicure Foot File and Callus Remover can be a good option.   3. Lotion can be applied to soften the callus. A urea based cream such as Kersal or Vanicream or thicker cream with shea butter are good options.  4. Toe spacers or toe covers can be used for corns, gel pads can be used for other lesions on the bottom of the foot.   If there is a surgical pathology noted, such as a prominent bone, often this needs to be addressed surgically to minimize recurrence. However, sometimes the lesion simply migrates to another spot after surgery, so it is not a guaranteed cure.     There was also discussion of the cost structure of callus care if they were to come back and have it treated in the clinic. Explained that if insurance does not cover it, they would be billed. This charge could range from $100 - $160.  They were also provided information on places to get the callus treatment.            DIABETES AND YOUR FEET  Diabetes can result in several problems in the feet including ulcers (open sores) and amputations. Two of the most important reasons why people develop foot problems when they have diabetes is : 1. Neuropathy (loss of feeling)  2. Vascular disease (loss or decrease of blood flow).    Neuropathy is a term used to describe a loss of nerve function.  Patients with diabetes are at risk of developing neuropathy if their sugars continue to run high and are above the normal value. One theory for neuropathy is that the \"extra\" sugar in the body enters the nerves and is broken down. These by-products build up in the nerve causing it to swell and impairing nerve function. Often times, this can be prevented by controlling your sugars, dieting and exercise.    When a person develops neuropathy, they usually begin to feel numbness or tingling in their feet and sometime in their legs.  Other " symptoms may include painful burning or hot feet, tingling or feeling like insects or ants are crawling on your feet or legs.  If the diabetes is sever and the sugars run high for long periods of time, neuropathy can also occur in the hands.    Vascular disease  is a term used to describe a loss or decrease in circulation (blood flow). There is a problem in getting blood and oxygen to areas that need it. Similar to neuropathy, sugars can build up in the walls of the arteries (blood vessels) and cause them to become swollen, thickened and hardened. This decreases the amount of blood that can go to an area that needs it. Though this is common in the legs of diabetic patients, it can also affect other arteries (blood vessels) in the body such as in the heart and eyes.    In the legs, vascular disease usually results in cramping. Patients who develop leg cramps after walking the same distance every time (i.e. One block, half a mile, ect.) need to let their doctors know so that their circulation may be checked. Cramps causing severe pain in the feet and/or legs while sleeping and the cramps go away when you stand or hang your legs off the side of the bed, may also be a sign of poor blood circulation.  Occasional cramping in cold weather or on rare occasions with activity may not be due to poor circulation, but you should inform your doctor.    PREVENTION OF THESE DISEASES  The key to prevention is good blood sugar control. Poor blood sugar control is a big reason many of these problems start. Physical activity (exercise) is a very good way to help decrease your blood sugars. Exercise can lower your blood sugar, blood pressure, and cholesterol. It also reduces your risk for heart disease and stroke, relieves stress, and strengthens your heart, muscles and bones.  In addition, regular activity helps insulin work better, improves your blood circulation, and keeps your joints flexible. If you're trying to lose weight, a  combination of exercise and wise food choices can help you reach your target weight and maintain it.      PAIN MANAGEMENT  1.Blood Sugar Control - Most important  2. Medications such as:  Amytriptylline, duloxetine, gabapentin, lyrica, tramadol  3. Nutritional therapy:  Vitamin B6 (100mg daily), Vitamin B12 (75mcg daily), Vitamin D 2000 IU daily), Alpha-Lipoic Acid (600-1800mg daily), Acetyl-L-Carnitine (500-1000mg TID, L-methyl folate (1500mcg daily)    ** Metformin can block Vitamin B6 and B12 so it is important to supplement**    FOOT CARE RECOMMENDATIONS   1. Wash your feet with lukewarm water and a mild soap and then dry them thoroughly, especially between the toes.     2. Examine your feet daily looking for cuts, corns, blisters, cracks, ect, especially after wearing new shoes. Make sure to look between your toes. If you cannot see the bottom of your feet, set a mirror on the floor and hold your foot over it, or ask a spouse, friend or family member to examine your feet for you. Contact your doctor immediately if new problems are noted or if sores are not healing.     3. Immediately apply moisturizer to the tops and bottoms of your feet, avoiding areas between the toes. Hand lotion (Intesive Care, Karla, Eucerin, Neutrogena, Curel, ect) is sufficient unless your doctor prescribes a medicated lotion. Apply sunscreen to your feet when going swimming outside.     4. Use clean comfortable shoes, wear white socks (if you have any bleeding or drainage, you will see it on white socks). Socks should not have thick seams or cut off the circulation around the leg. Break in new shoes slowly and rotate with older shoes until broken in. Check the inside of your shoes with your hand to look for areas of irritation or objects that may have fallen into your shoes.       5. Keep slippers by the side of your bed for use during the night.     6.  Shoes should be fitted by a professional and should not cause areas of irritation.   Check your feet regularly when wearing a new pair of shoes and replace them as needed.     7.  Talk to your doctor about proper exercise. Exercise and stretching stimulate blood flow to your feet and maintain proper glucose levels.     8.  Monitor your blood glucose level as instructed by your doctor. Notify your doctor immediately if your blood sugar is abnormally high or low.    9. Cut your nails straight across, but then gently round any sharp edges with a cardboard nail file. If you have neuropathy, peripheral vascular disease or cannot see that well to trim your own toenails contact [facility] ([phone]) or [facility] ([phone]).      THINGS TO AVOID DOING   1.  Do not soak your feet if you have an open sore. Use only lukewarm water and always check the temperature with your hand as hot water can easily burn your feet.       2.  Never use a hot water bottle or heating pad on your feet. Also do not apply cold compresses to your feet. With decreased sensation, you could burn or freeze your feet.       3.  Do not apply any of these to your feet:    -  Over the counter medicine for corns or warts    -  Harsh chemicals like boric acid    -  Do not self-treat corns, cuts, blisters or infections. Always consult your doctor.       4.  Do not wear sandals, slippers or walk barefoot, especially on hot sand or concrete or other harsh surfaces.     5.  If you smoke, stop!!!              BODY WEIGHT AND YOUR FEET  The following information is included in the after visit summary for all patients. Body weight can be a sensitive issue to discuss in clinic, but we think the following information is very important. Although we focus on the feet and ankles, we do support the overall health of our patients.     Many things can cause foot and ankle problems. Foot structure, activity level, foot mechanics and injuries are common causes of pain. One very important issue that often goes unmentioned, is body weight. Extra  weight can cause increased stress on muscles, ligaments, bones and tendons. Sometimes just a few extra pounds is all it takes to put one over her/his threshold. Without reducing that stress, it can be difficult to alleviate pain. As Foot & Ankle specialists, our job is addressing the lower extremity problem and possible causes. Regarding extra body weight, we encourage patients to discuss diet and weight management plans with their primary care doctors. It is this team approach that gives you the best opportunity for pain relief and getting you back on your feet.      [place] has a Comprehensive Weight Management Program. This program includes counseling, education, non-surgical and surgical approaches to weight loss. If you are interested in learning more either talk to you primary care provider or call [phone].

## 2019-04-17 ENCOUNTER — OFFICE VISIT (OUTPATIENT)
Dept: NURSING | Facility: CLINIC | Age: 59
End: 2019-04-17
Payer: MEDICARE

## 2019-04-17 ENCOUNTER — OFFICE VISIT (OUTPATIENT)
Dept: FAMILY MEDICINE | Facility: CLINIC | Age: 59
End: 2019-04-17
Payer: MEDICARE

## 2019-04-17 VITALS
SYSTOLIC BLOOD PRESSURE: 138 MMHG | DIASTOLIC BLOOD PRESSURE: 88 MMHG | HEART RATE: 78 BPM | TEMPERATURE: 97.9 F | BODY MASS INDEX: 39.27 KG/M2 | RESPIRATION RATE: 14 BRPM | WEIGHT: 236 LBS

## 2019-04-17 DIAGNOSIS — R39.11 BENIGN PROSTATIC HYPERPLASIA WITH URINARY HESITANCY: ICD-10-CM

## 2019-04-17 DIAGNOSIS — Z23 NEED FOR VACCINATION AGAINST HEPATITIS A: Primary | ICD-10-CM

## 2019-04-17 DIAGNOSIS — F33.1 MODERATE EPISODE OF RECURRENT MAJOR DEPRESSIVE DISORDER (H): Primary | ICD-10-CM

## 2019-04-17 DIAGNOSIS — N40.1 BENIGN PROSTATIC HYPERPLASIA WITH URINARY HESITANCY: ICD-10-CM

## 2019-04-17 DIAGNOSIS — R45.4 EXCESSIVE ANGER: ICD-10-CM

## 2019-04-17 DIAGNOSIS — R41.3 MEMORY DIFFICULTIES: ICD-10-CM

## 2019-04-17 PROCEDURE — 90632 HEPA VACCINE ADULT IM: CPT

## 2019-04-17 PROCEDURE — 99214 OFFICE O/P EST MOD 30 MIN: CPT | Performed by: PHYSICIAN ASSISTANT

## 2019-04-17 PROCEDURE — 99207 ZZC NO CHARGE NURSE ONLY: CPT

## 2019-04-17 PROCEDURE — 90471 IMMUNIZATION ADMIN: CPT

## 2019-04-17 RX ORDER — BUPROPION HYDROCHLORIDE 300 MG/1
300 TABLET ORAL EVERY MORNING
Qty: 90 TABLET | Refills: 1 | Status: SHIPPED | OUTPATIENT
Start: 2019-04-17 | End: 2019-10-24

## 2019-04-17 RX ORDER — FINASTERIDE 5 MG/1
5 TABLET, FILM COATED ORAL DAILY
Qty: 90 TABLET | Refills: 0 | Status: SHIPPED | OUTPATIENT
Start: 2019-04-17 | End: 2019-08-02

## 2019-04-17 RX ORDER — CITALOPRAM HYDROBROMIDE 20 MG/1
TABLET ORAL
Qty: 90 TABLET | Refills: 1 | Status: SHIPPED | OUTPATIENT
Start: 2019-04-17 | End: 2019-10-24

## 2019-04-17 NOTE — PROGRESS NOTES
SUBJECTIVE:   Steve Morgan is a 59 year old male who presents to clinic today for the following   health issues:      Concern - Memory issues  Onset: X 1 year    Description:   Having memory issues intermittently.  Has been at a department store, ones that he has been at previously and he will forget how to get home or where he is.  Also during conversations, having delayed processing.    Intensity: moderate    Progression of Symptoms:  worsening    Accompanying Signs & Symptoms:  none    Previous history of similar problem:   none    Precipitating factors:   Worsened by: not sure    Alleviating factors:  Improved by: none    Therapies Tried and outcome: none    Six Item Cognitive Impairment Test   (6CIT):      What year is it?                               Correct - 0 points    What month is it?                               Correct - 0 points      Give the patient an address to remember with five components:   Thomas Butler ( first and last name - 2 components)   [address]  (number and name of street - 2 components)   [place] ( city - 1 component)      About what time is it (within the hour)? Correct - 0 points    Count backwards from 20 to 1:   Correct - 0 points    Say the months of the year in reverse: Correct - 0 points    Repeat the address phrase:   1 error - 2 points-house number's switched around (said 232)    Total 6CIT Score:      2/28    Interpretation: The 6CIT uses an inverse score and questions are weighted to produce a total out of 28. Scores of 0-7 are considered normal and 8 or more significant.    Advantages The test has high sensitivity without compromising specificity even in mild dementia. It is easy to translate linguistically and culturally.  Disadvantages The main disadvantage is in the scoring and weighting of the test, which is initially confusing, however computer models have simplified this greatly.    Probability Statistics: At the 7/8 cut off: Overall figures sensitivity 90%  specificity 100%, in mild dementia sensitivity = 78% , specificity = 100%    Copyright 2000 [facility], [facility], [place]. Courtesy of Dr. Damian Prescott    Depression Followup    Status since last visit: increased irritability    See PHQ-9 for current symptoms.  Other associated symptoms: None    Complicating factors:   Significant life event:  No   Current substance abuse:  None  Anxiety or Panic symptoms:  No    PHQ 9/18/2018 11/19/2018 4/1/2019   PHQ-9 Total Score 12 6 11   Q9: Thoughts of better off dead/self-harm past 2 weeks Not at all Not at all Not at all     In the past two weeks have you had thoughts of suicide or self-harm?  No.    Do you have concerns about your personal safety or the safety of others?   No   Would like to restart counseling  PHQ-9  English  PHQ-9   Any Language  Suicide Assessment Five-step Evaluation and Treatment (SAFE-T)    Additional history: as documented    Reviewed  and updated as needed this visit by clinical staff  Tobacco  Allergies  Meds  Med Hx  Surg Hx  Fam Hx  Soc Hx        Reviewed and updated as needed this visit by Provider         Patient Active Problem List   Diagnosis     Anemia     Morbid obesity due to excess calories (H)     Sleep apnea     Neuropathy in diabetes (H)     Hypothyroidism     HYPERLIPIDEMIA LDL GOAL <100     Hypertension goal BP (blood pressure) < 140/90     Peripheral arterial disease (H)     Tremor     Peripheral neuropathy     Excessive anger     Generalized muscle weakness     Health Care Home     Unsteady gait     DAMIÁN (obstructive sleep apnea)     Vitamin B12 deficiency without anemia     Hyponatremia     Chronic hyponatremia     Type 2 diabetes mellitus with diabetic neuropathy (H)     Depression     Cellulitis and abscess of trunk     Benign prostatic hyperplasia with urinary hesitancy     Past Surgical History:   Procedure Laterality Date     COLONOSCOPY       GENITOURINARY SURGERY      surg for undescended testicle      REPAIR HAMMER TOE BILATERAL  5/16/2013    Procedure: REPAIR HAMMER TOE BILATERAL;  Flexor Tenotomy Toes 2,3,4,5 Bilateral Feet;  Surgeon: Saad Bangura DPM;  Location: [facility] OR       Social History     Tobacco Use     Smoking status: Never Smoker     Smokeless tobacco: Never Used   Substance Use Topics     Alcohol use: Yes     Comment: occ     Family History   Problem Relation Age of Onset     Breast Cancer Mother      Hypertension Mother      Thyroid Disease Mother      Depression Mother      Cancer - colorectal Father      Thyroid Disease Father      Depression Father      Heart Disease Maternal Grandmother         CHF     Circulatory Paternal Grandmother      Cancer Paternal Grandfather      Diabetes Brother      Diabetes Brother            ROS:  Constitutional, HEENT, cardiovascular, pulmonary, gi and gu systems are negative, except as otherwise noted.    OBJECTIVE:     /88 (BP Location: Right arm, Patient Position: Chair, Cuff Size: Adult Large)   Pulse 78   Temp 97.9  F (36.6  C) (Oral)   Resp 14   Wt 107 kg (236 lb)   BMI 39.27 kg/m    Body mass index is 39.27 kg/m .  GENERAL APPEARANCE: healthy, alert and no distress  RESP: lungs clear to auscultation - no rales, rhonchi or wheezes  CV: regular rates and rhythm, normal S1 S2, no S3 or S4 and no murmur, click or rub  PSYCH: mentation appears normal and affect normal/bright        ASSESSMENT/PLAN:             1. Moderate episode of recurrent major depressive disorder (H)  Wants to start counseling  - MENTAL HEALTH REFERRAL  - Adult; Outpatient Treatment; Individual/Couples/Family/Group Therapy/Health Psychology; [facility] [place] [facility] [phone]; We will contact you to schedule the appointment or please call with any questions    2. Memory difficulties  Referral for testing.   - NEUROPSYCHOLOGY REFERRAL    3. Excessive anger  Does not want to change meds.  - citalopram (CELEXA) 20 MG tablet; TAKE 1 TABLET (20 MG) BY MOUTH DAILY   Dispense: 90 tablet; Refill: 1  - buPROPion (WELLBUTRIN XL) 300 MG 24 hr tablet; Take 1 tablet (300 mg) by mouth every morning  Dispense: 90 tablet; Refill: 1    4. Benign prostatic hyperplasia with urinary hesitancy  Stable.   - finasteride (PROSCAR) 5 MG tablet; Take 1 tablet (5 mg) by mouth daily  Dispense: 90 tablet; Refill: 0        Lisa Pierre PA-C  [facility]

## 2019-04-25 ENCOUNTER — MYC MEDICAL ADVICE (OUTPATIENT)
Dept: FAMILY MEDICINE | Facility: CLINIC | Age: 59
End: 2019-04-25

## 2019-08-02 DIAGNOSIS — N40.1 BENIGN PROSTATIC HYPERPLASIA WITH URINARY HESITANCY: ICD-10-CM

## 2019-08-02 DIAGNOSIS — R39.11 BENIGN PROSTATIC HYPERPLASIA WITH URINARY HESITANCY: ICD-10-CM

## 2019-08-02 RX ORDER — FINASTERIDE 5 MG/1
TABLET, FILM COATED ORAL
Qty: 90 TABLET | Refills: 0 | Status: SHIPPED | OUTPATIENT
Start: 2019-08-02 | End: 2019-10-24

## 2019-08-02 NOTE — TELEPHONE ENCOUNTER
Prescription approved per [facility] Refill Protocol.  Demario Reeder, RN, BSN

## 2019-08-02 NOTE — TELEPHONE ENCOUNTER
"Requested Prescriptions   Pending Prescriptions Disp Refills     finasteride (PROSCAR) 5 MG tablet [Pharmacy Med Name: FINASTERIDE 5 MG TABLET] 90 tablet 0     Sig: TAKE 1 TABLET BY MOUTH EVERY DAY  Last Written Prescription Date:  4/17/19  Last Fill Quantity: 90 tab,  # refills: 0   Last office visit: 4/17/2019 with prescribing provider:  Ignacio   Future Office Visit:   Next 5 appointments (look out 90 days)    Aug 29, 2019  9:30 AM CDT  Return Visit with BRIDGET Payne CNP  [facility] [address] [facility] [address]  [phone]             Alpha Blockers Passed - 8/2/2019  1:11 AM        Passed - Blood pressure under 140/90 in past 12 months     BP Readings from Last 3 Encounters:   04/17/19 138/88   04/02/19 126/78   04/01/19 140/89                 Passed - Recent (12 mo) or future (30 days) visit within the authorizing provider's specialty     Patient had office visit in the last 12 months or has a visit in the next 30 days with authorizing provider or within the authorizing provider's specialty.  See \"Patient Info\" tab in inbasket, or \"Choose Columns\" in Meds & Orders section of the refill encounter.              Passed - Patient does not have Tadalafil, Vardenafil, or Sildenafil on their medication list        Passed - Medication is active on med list        Passed - Patient is 18 years of age or older          "

## 2019-08-29 ENCOUNTER — OFFICE VISIT (OUTPATIENT)
Dept: ENDOCRINOLOGY | Facility: CLINIC | Age: 59
End: 2019-08-29
Payer: MEDICARE

## 2019-08-29 VITALS
SYSTOLIC BLOOD PRESSURE: 141 MMHG | HEIGHT: 66 IN | HEART RATE: 89 BPM | BODY MASS INDEX: 39.08 KG/M2 | TEMPERATURE: 97.8 F | DIASTOLIC BLOOD PRESSURE: 83 MMHG | WEIGHT: 243.2 LBS | RESPIRATION RATE: 14 BRPM

## 2019-08-29 DIAGNOSIS — Z79.4 TYPE 2 DIABETES MELLITUS WITH DIABETIC NEUROPATHY, WITH LONG-TERM CURRENT USE OF INSULIN (H): Primary | ICD-10-CM

## 2019-08-29 DIAGNOSIS — E78.5 HYPERLIPIDEMIA LDL GOAL <100: ICD-10-CM

## 2019-08-29 DIAGNOSIS — R73.9 HYPERGLYCEMIA: ICD-10-CM

## 2019-08-29 DIAGNOSIS — E11.40 TYPE 2 DIABETES MELLITUS WITH DIABETIC NEUROPATHY, WITH LONG-TERM CURRENT USE OF INSULIN (H): Primary | ICD-10-CM

## 2019-08-29 DIAGNOSIS — E11.40 TYPE 2 DIABETES MELLITUS WITH DIABETIC NEUROPATHY, WITHOUT LONG-TERM CURRENT USE OF INSULIN (H): ICD-10-CM

## 2019-08-29 DIAGNOSIS — E03.4 HYPOTHYROIDISM DUE TO ACQUIRED ATROPHY OF THYROID: ICD-10-CM

## 2019-08-29 LAB
ALBUMIN SERPL-MCNC: 3.6 G/DL (ref 3.4–5)
ALP SERPL-CCNC: 77 U/L (ref 40–150)
ALT SERPL W P-5'-P-CCNC: 28 U/L (ref 0–70)
ANION GAP SERPL CALCULATED.3IONS-SCNC: 9 MMOL/L (ref 3–14)
AST SERPL W P-5'-P-CCNC: 18 U/L (ref 0–45)
BILIRUB SERPL-MCNC: 0.4 MG/DL (ref 0.2–1.3)
BUN SERPL-MCNC: 18 MG/DL (ref 7–30)
CALCIUM SERPL-MCNC: 8.9 MG/DL (ref 8.5–10.1)
CHLORIDE SERPL-SCNC: 97 MMOL/L (ref 94–109)
CHOLEST SERPL-MCNC: 192 MG/DL
CO2 SERPL-SCNC: 23 MMOL/L (ref 20–32)
CREAT SERPL-MCNC: 1.18 MG/DL (ref 0.66–1.25)
GFR SERPL CREATININE-BSD FRML MDRD: 66 ML/MIN/{1.73_M2}
GLUCOSE SERPL-MCNC: 316 MG/DL (ref 70–99)
HBA1C MFR BLD: 11.6 % (ref 0–5.6)
HDLC SERPL-MCNC: 34 MG/DL
LDLC SERPL CALC-MCNC: 121 MG/DL
NONHDLC SERPL-MCNC: 158 MG/DL
POTASSIUM SERPL-SCNC: 4.5 MMOL/L (ref 3.4–5.3)
PROT SERPL-MCNC: 7.4 G/DL (ref 6.8–8.8)
SODIUM SERPL-SCNC: 130 MMOL/L (ref 133–144)
TRIGL SERPL-MCNC: 186 MG/DL

## 2019-08-29 PROCEDURE — 80061 LIPID PANEL: CPT | Performed by: CLINICAL NURSE SPECIALIST

## 2019-08-29 PROCEDURE — 80053 COMPREHEN METABOLIC PANEL: CPT | Performed by: CLINICAL NURSE SPECIALIST

## 2019-08-29 PROCEDURE — 84681 ASSAY OF C-PEPTIDE: CPT | Performed by: CLINICAL NURSE SPECIALIST

## 2019-08-29 PROCEDURE — 36415 COLL VENOUS BLD VENIPUNCTURE: CPT | Performed by: CLINICAL NURSE SPECIALIST

## 2019-08-29 PROCEDURE — 99214 OFFICE O/P EST MOD 30 MIN: CPT | Performed by: CLINICAL NURSE SPECIALIST

## 2019-08-29 PROCEDURE — 83036 HEMOGLOBIN GLYCOSYLATED A1C: CPT | Performed by: CLINICAL NURSE SPECIALIST

## 2019-08-29 RX ORDER — LEVOTHYROXINE SODIUM 125 UG/1
125 TABLET ORAL DAILY
Qty: 90 TABLET | Refills: 3 | Status: SHIPPED | OUTPATIENT
Start: 2019-08-29 | End: 2019-12-17

## 2019-08-29 ASSESSMENT — MIFFLIN-ST. JEOR: SCORE: 1850.9

## 2019-08-29 NOTE — PROGRESS NOTES
"Name: Steve \"Caio\" Cathy  Seen at the request of Lisa Pierre for Diabetes (Last seen 12/14/2018).  HPI:  Steve Morgan is a 59 year old male who presents for the management of:    1. Type 2 DM:  Orginally diagnosed at the age of 33 or 38.  Was prediabetic prior, was not particularly symptomatic at the time, was noted on routine lab work    Current Regimen: Metformin 1000 mg qd, Lantus 30 units qd (started 5/2017)     Previously on metformin 2000 mg/day and glipizide 20 mg q am.  At first, he 'confessed' that he has not been taking his medications for about 18 months but then said \"no, I've been taking the Lantus and one metformin everyday\".  Says he was trying to find out if he really needed all the different medications.    BS checks: None - not wearing Nina since April 2019.    Meter Download: Did not bring meter    Elevated blood sugar due to continued dietary indiscretions, states he hasn't been watching his diet as carefully.  Likes to snack between meals, buys snacks at the gas station.      Complications:   Diabetes Complications  Description / Detail    Diabetic Retinopathy  No retinopathy, early cataract, last exam 7/2018.  Cataract surgery 9/2018   CAD / PAD  No   Neuropathy  Yes + diabetic neuropathy: numbness hands and feet.  Saw podiatry, Dr. Mathis, 1/27/2018- using diabetic shoes   Nephropathy / Microalbuminuria  No   Gastroparesis  No   Hypoglycemia [place]  Not sure, gets 'kind of dizzy' when blood sugar is low but reports history of low blood sugars without symptoms     2. Hypertension: Blood Pressure today:   BP Readings from Last 3 Encounters:   08/29/19 (!) 141/83   04/17/19 138/88   04/02/19 126/78   .  Blood pressure medications include atenolol 25 mg qd and losartan 50 mg every day.   3. Hyperlipidemia: Takes simvastatin 20 mg for lipid control.       4. Prevention:  Flu Shot- 9/2018  Pneumovax- 2012  Opthalmology-Yes: annually  Dental-Yes: twice a year  ASA-Yes, 81 mg qd "   Smoking- No  5.  Hypothyroidism. Currently treated with levoxyl (KAMERON) 125 mcg daily. Takes the levoxyl first thing in the morning and waits 30+ minutes before eating breakfast.  PA for Levoxyl approved through 2/18/2020.  PMH/PSH:  Past Medical History:   Diagnosis Date     Cellulitis and abscess of trunk 6/27/2017     Depression      Hyperlipidemia LDL goal <100 10/31/2010     Hypertension goal BP (blood pressure) < 140/90 3/17/2011     Hypothyroidism 1/12/2010     Morbid obesity due to excess calories (H) 1/12/2010     Neuropathy in diabetes (H) 1/12/2010     Obesity 1/12/2010     Sleep apnea 1/12/2010    CPAP     Type 2 diabetes, HbA1C goal < 8% (H) 3/8/2011     Past Surgical History:   Procedure Laterality Date     COLONOSCOPY       GENITOURINARY SURGERY      surg for undescended testicle     REPAIR HAMMER TOE BILATERAL  5/16/2013    Procedure: REPAIR HAMMER TOE BILATERAL;  Flexor Tenotomy Toes 2,3,4,5 Bilateral Feet;  Surgeon: Saad Bangura DPM;  Location: [facility] OR     Family Hx:  Family History   Problem Relation Age of Onset     Breast Cancer Mother      Hypertension Mother      Thyroid Disease Mother      Depression Mother      Cancer - colorectal Father      Thyroid Disease Father      Depression Father      Heart Disease Maternal Grandmother         CHF     Circulatory Paternal Grandmother      Cancer Paternal Grandfather      Diabetes Brother      Diabetes Brother      Thyroid disease: Yes: mother         DM2: Yes: one brother with type 1 diabetes and one brother with type 2 diabetes, paternal aunt with DM2         Autoimmune: DM1, SLE, RA, Vitiligo Yes: brother with DM1    Social Hx:  Social History     Socioeconomic History     Marital status:      Spouse name: Sri     Number of children: 2     Years of education: Not on file     Highest education level: Not on file   Occupational History     Occupation:      Employer: NONE      Comment: [employer]   Social Needs     Financial  resource strain: Not on file     Food insecurity:     Worry: Not on file     Inability: Not on file     Transportation needs:     Medical: Not on file     Non-medical: Not on file   Tobacco Use     Smoking status: Never Smoker     Smokeless tobacco: Never Used   Substance and Sexual Activity     Alcohol use: Yes     Comment: occ     Drug use: No     Sexual activity: Not Currently     Partners: Female   Lifestyle     Physical activity:     Days per week: Not on file     Minutes per session: Not on file     Stress: Not on file   Relationships     Social connections:     Talks on phone: Not on file     Gets together: Not on file     Attends Taoist service: Not on file     Active member of club or organization: Not on file     Attends meetings of clubs or organizations: Not on file     Relationship status: Not on file     Intimate partner violence:     Fear of current or ex partner: Not on file     Emotionally abused: Not on file     Physically abused: Not on file     Forced sexual activity: Not on file   Other Topics Concern     Parent/sibling w/ CABG, MI or angioplasty before 65F 55M? No   Social History Narrative     Not on file          MEDICATIONS:  has a current medication list which includes the following prescription(s): dulaglutide, ammonium lactate, ammonium lactate, aspirin, atenolol, blood glucose, blood glucose, blood glucose monitoring, bupropion, calcium carb-cholecalciferol, citalopram, freestyle juan 14 day reader, freestyle juan 14 day sensor, cyanocobalamin, ferrous sulfate, finasteride, glipizide, hydrocortisone, insulin glargine, insulin pen needle, levothyroxine, losartan, metformin, multi-vitamin, omeprazole-sodium bicarbonate, order for dme, order for dme, order for dme, order for dme, simvastatin, UNABLE TO FIND, and cholecalciferol.    ROS     ROS: 10 point ROS neg other than the symptoms noted above in the HPI.    Physical Exam   VS: BP (!) 141/83 (BP Location: Right arm, Patient  "Position: Chair, Cuff Size: Adult Large)   Pulse 89   Temp 97.8  F (36.6  C) (Oral)   Resp 14   Ht 1.676 m (5' 6\")   Wt 110.3 kg (243 lb 3.2 oz)   BMI 39.25 kg/m    GENERAL: AXOX3, NAD, well dressed, answering questions appropriately, appears stated age.  HEENT: no exopthalmous, no proptosis, no lig lag, no retraction  CV: RRR, no rubs, gallops, no murmurs  LUNGS: CTAB, no wheezes, rales, or ronchi  EXTREMITIES: trace edema at the ankles  NEUROLOGY: CN grossly intact, no tremors  MSK: grossly intact  SKIN: no rashes, no lesions    LABS:  A1c:   Component      Latest Ref Rng & Units 12/8/2017 12/14/2018 8/29/2019   Hemoglobin A1C      0 - 5.6 % 9.0 (H) 11.8 (H) 11.6 (H)     Basic Metabolic Panel:  !COMPREHENSIVE Latest Ref Rng & Units 9/18/2018   SODIUM 133 - 144 mmol/L 133   POTASSIUM 3.4 - 5.3 mmol/L 5.3   CHLORIDE 94 - 109 mmol/L 99   BUN 7 - 30 mg/dL 15   Creatinine 0.66 - 1.25 mg/dL 1.08   Glucose 70 - 99 mg/dL 218 (H)   ANION GAP 3 - 14 mmol/L 9   CALCIUM 8.5 - 10.1 mg/dL 9.2   ALBUMIN 3.4 - 5.0 g/dL 3.9     LFTS/Cholesterol Panel:  !LIPID/HEPATIC Latest Ref Rng & Units 9/18/2018   CHOLESTEROL <200 mg/dL 188   TRIGLYCERIDES <150 mg/dL 219 (H)   HDL CHOLESTEROL >39 mg/dL 30 (L)   LDL CHOLESTEROL, CALCULATED <100 mg/dL 114 (H)   VLDL-CHOLESTEROL 0 - 30 mg/dL    NON HDL CHOLESTEROL <130 mg/dL 158 (H)   CHOLESTEROL/HDL RATIO 0.0 - 5.0    AST 0 - 45 U/L 23   ALT 0 - 70 U/L 30     Thyroid Function:   !THYROID Latest Ref Rng & Units 12/14/2018   TSH 0.40 - 4.00 mU/L 2.19     Component    Latest Ref Rng & Units 10/19/2018   Thyroid Peroxidase Antibody    <35 IU/mL 11   Thyroglobulin Antibody    <40 IU/mL <20   Thyroid Stim Immunog    <=1.3 TSI index <1.0     Urine MicroAlbumin:  Component      Latest Ref Rng & Units 12/14/2018   Creatinine Urine      mg/dL 32   Albumin Urine mg/L      mg/L 20   Albumin Urine mg/g Cr      0 - 17 mg/g Cr 63.47 (H)     Vital Signs 4/2/2019 4/17/2019 8/29/2019   Weight (LB) 223 lb " "236 lb 243 lb 3.2 oz   Height 5' 5\"  5' 6\"   BMI (Calculated) 37.11  39.25       All pertinent notes, labs, and images personally reviewed by me.     A/P  Mr.Walter Morgan is a 59 year old here for the evaluation/management of diabetes:    1. DM2 - Uncontrolled.  A1c extremely elevated at 11.6%!  Hyperglycemia likely due to dietary indiscretions - he has gained 20 lbs since 4/2019..    Diabetes is complicated by neuropathy  No blood sugar data for review today.  C/o limited use of his hands (neuropathy + tremor - both significant) and increasing difficulty with ADL's   Can't test his blood sugars using a meter due to the above.    Needs to start wearing the Nina again.   I'm checking a c-peptide today to assess pancreatic function.  Start Trulicity 0.75 mg weekly.  Plan to increase to 1.5 mg weekly in one month if tolerating.   Increase Metformin to 1000 mg bid.  Continue Lantus to 30 units every day  MTM referral provided.  I asked him to bring his Nina with to this appointment for download and review.    He had some additional concerns about left shoulder pain and nose bleeds, + BP uncontrolled - I recommended he schedule a follow up with his PCP.     2. Hypothyroidism.  Currently treated with Levoxyl (KAMERON) 125 mcg/day.  Clinically and biochemically euthyroid. Continue Levoxyl 125 mcg/day.      3.  Hypertension - Variable.      4. Hyperlipidemia - On statin therapy.    Labs ordered today:   Orders Placed This Encounter   Procedures     Hemoglobin A1c     Lipid panel reflex to direct LDL Fasting     Comprehensive metabolic panel     C-peptide     MED THERAPY MANAGE REFERRAL       Radiology/Consults ordered today: MED THERAPY MANAGE REFERRAL    All questions were answered.  The patient indicates understanding of the above issues and agrees with the plan set forth.  Total face to face time greater than or equal to 25 minutes.       Follow-up:  3 months with mahendra Butler NP  Endocrinology  [facility] " [place]  CC: Lisa Pierre Paull

## 2019-08-29 NOTE — Clinical Note
Alta, I discussed IDM with Caio today and he is interested although he's not sure he can succeed.  He has trouble controlling his appetite - especially for sweets.  I am getting a cpeptide today to check pancreatic function and started trulicity (if out of pocket costs are doable).  Can you have his Nina downloaded for review when he sees you?  Thanks!

## 2019-08-29 NOTE — LETTER
"    8/29/2019         RE: Steve Morgan  [id number]  [facility] [id number]        Dear Colleague,    Thank you for referring your patient, Steve Morgan, to the [facility]. Please see a copy of my visit note below.    Name: Steve \"Caio\" Cathy  Seen at the request of Lisa Pierre for Diabetes (Last seen 12/14/2018).  HPI:  Steve Morgan is a 59 year old male who presents for the management of:    1. Type 2 DM:  Orginally diagnosed at the age of 33 or 38.  Was prediabetic prior, was not particularly symptomatic at the time, was noted on routine lab work    Current Regimen: Metformin 1000 mg qd, Lantus 30 units qd (started 5/2017)     Previously on metformin 2000 mg/day and glipizide 20 mg q am.  At first, he 'confessed' that he has not been taking his medications for about 18 months but then said \"no, I've been taking the Lantus and one metformin everyday\".  Says he was trying to find out if he really needed all the different medications.    BS checks: None - not wearing Nina since April 2019.    Meter Download: Did not bring meter    Elevated blood sugar due to continued dietary indiscretions, states he hasn't been watching his diet as carefully.  Likes to snack between meals, buys snacks at the gas station.      Complications:   Diabetes Complications  Description / Detail    Diabetic Retinopathy  No retinopathy, early cataract, last exam 7/2018.  Cataract surgery 9/2018   CAD / PAD  No   Neuropathy  Yes + diabetic neuropathy: numbness hands and feet.  Saw podiatry, Dr. Mathis, 1/27/2018- using diabetic shoes   Nephropathy / Microalbuminuria  No   Gastroparesis  No   Hypoglycemia [place]  Not sure, gets 'kind of dizzy' when blood sugar is low but reports history of low blood sugars without symptoms     2. Hypertension: Blood Pressure today:   BP Readings from Last 3 Encounters:   08/29/19 (!) 141/83   04/17/19 138/88   04/02/19 126/78   .  Blood pressure medications " include atenolol 25 mg qd and losartan 50 mg every day.   3. Hyperlipidemia: Takes simvastatin 20 mg for lipid control.       4. Prevention:  Flu Shot- 9/2018  Pneumovax- 2012  Opthalmology-Yes: annually  Dental-Yes: twice a year  ASA-Yes, 81 mg qd   Smoking- No  5.  Hypothyroidism. Currently treated with levoxyl (KAMERON) 125 mcg daily. Takes the levoxyl first thing in the morning and waits 30+ minutes before eating breakfast.  PA for Levoxyl approved through 2/18/2020.  PMH/PSH:  Past Medical History:   Diagnosis Date     Cellulitis and abscess of trunk 6/27/2017     Depression      Hyperlipidemia LDL goal <100 10/31/2010     Hypertension goal BP (blood pressure) < 140/90 3/17/2011     Hypothyroidism 1/12/2010     Morbid obesity due to excess calories (H) 1/12/2010     Neuropathy in diabetes (H) 1/12/2010     Obesity 1/12/2010     Sleep apnea 1/12/2010    CPAP     Type 2 diabetes, HbA1C goal < 8% (H) 3/8/2011     Past Surgical History:   Procedure Laterality Date     COLONOSCOPY       GENITOURINARY SURGERY      surg for undescended testicle     REPAIR HAMMER TOE BILATERAL  5/16/2013    Procedure: REPAIR HAMMER TOE BILATERAL;  Flexor Tenotomy Toes 2,3,4,5 Bilateral Feet;  Surgeon: Saad Bangura DPM;  Location: [facility] OR     Family Hx:  Family History   Problem Relation Age of Onset     Breast Cancer Mother      Hypertension Mother      Thyroid Disease Mother      Depression Mother      Cancer - colorectal Father      Thyroid Disease Father      Depression Father      Heart Disease Maternal Grandmother         CHF     Circulatory Paternal Grandmother      Cancer Paternal Grandfather      Diabetes Brother      Diabetes Brother      Thyroid disease: Yes: mother         DM2: Yes: one brother with type 1 diabetes and one brother with type 2 diabetes, paternal aunt with DM2         Autoimmune: DM1, SLE, RA, Vitiligo Yes: brother with DM1    Social Hx:  Social History     Socioeconomic History     Marital status:       Spouse name: Sri     Number of children: 2     Years of education: Not on file     Highest education level: Not on file   Occupational History     Occupation:      Employer: NONE      Comment: [employer]   Social Needs     Financial resource strain: Not on file     Food insecurity:     Worry: Not on file     Inability: Not on file     Transportation needs:     Medical: Not on file     Non-medical: Not on file   Tobacco Use     Smoking status: Never Smoker     Smokeless tobacco: Never Used   Substance and Sexual Activity     Alcohol use: Yes     Comment: occ     Drug use: No     Sexual activity: Not Currently     Partners: Female   Lifestyle     Physical activity:     Days per week: Not on file     Minutes per session: Not on file     Stress: Not on file   Relationships     Social connections:     Talks on phone: Not on file     Gets together: Not on file     Attends [employer] service: Not on file     Active member of club or organization: Not on file     Attends meetings of clubs or organizations: Not on file     Relationship status: Not on file     Intimate partner violence:     Fear of current or ex partner: Not on file     Emotionally abused: Not on file     Physically abused: Not on file     Forced sexual activity: Not on file   Other Topics Concern     Parent/sibling w/ CABG, MI or angioplasty before 65F 55M? No   Social History Narrative     Not on file          MEDICATIONS:  has a current medication list which includes the following prescription(s): dulaglutide, ammonium lactate, ammonium lactate, aspirin, atenolol, blood glucose, blood glucose, blood glucose monitoring, bupropion, calcium carb-cholecalciferol, citalopram, freestyle juan 14 day reader, freestyle juan 14 day sensor, cyanocobalamin, ferrous sulfate, finasteride, glipizide, hydrocortisone, insulin glargine, insulin pen needle, levothyroxine, losartan, metformin, multi-vitamin, omeprazole-sodium bicarbonate, order for dme, order  "for dme, order for dme, order for dme, simvastatin, UNABLE TO FIND, and cholecalciferol.    ROS     ROS: 10 point ROS neg other than the symptoms noted above in the HPI.    Physical Exam   VS: BP (!) 141/83 (BP Location: Right arm, Patient Position: Chair, Cuff Size: Adult Large)   Pulse 89   Temp 97.8  F (36.6  C) (Oral)   Resp 14   Ht 1.676 m (5' 6\")   Wt 110.3 kg (243 lb 3.2 oz)   BMI 39.25 kg/m     GENERAL: AXOX3, NAD, well dressed, answering questions appropriately, appears stated age.  HEENT: no exopthalmous, no proptosis, no lig lag, no retraction  CV: RRR, no rubs, gallops, no murmurs  LUNGS: CTAB, no wheezes, rales, or ronchi  EXTREMITIES: trace edema at the ankles  NEUROLOGY: CN grossly intact, no tremors  MSK: grossly intact  SKIN: no rashes, no lesions    LABS:  A1c:   Component      Latest Ref Rng & Units 12/8/2017 12/14/2018 8/29/2019   Hemoglobin A1C      0 - 5.6 % 9.0 (H) 11.8 (H) 11.6 (H)     Basic Metabolic Panel:  !COMPREHENSIVE Latest Ref Rng & Units 9/18/2018   SODIUM 133 - 144 mmol/L 133   POTASSIUM 3.4 - 5.3 mmol/L 5.3   CHLORIDE 94 - 109 mmol/L 99   BUN 7 - 30 mg/dL 15   Creatinine 0.66 - 1.25 mg/dL 1.08   Glucose 70 - 99 mg/dL 218 (H)   ANION GAP 3 - 14 mmol/L 9   CALCIUM 8.5 - 10.1 mg/dL 9.2   ALBUMIN 3.4 - 5.0 g/dL 3.9     LFTS/Cholesterol Panel:  !LIPID/HEPATIC Latest Ref Rng & Units 9/18/2018   CHOLESTEROL <200 mg/dL 188   TRIGLYCERIDES <150 mg/dL 219 (H)   HDL CHOLESTEROL >39 mg/dL 30 (L)   LDL CHOLESTEROL, CALCULATED <100 mg/dL 114 (H)   VLDL-CHOLESTEROL 0 - 30 mg/dL    NON HDL CHOLESTEROL <130 mg/dL 158 (H)   CHOLESTEROL/HDL RATIO 0.0 - 5.0    AST 0 - 45 U/L 23   ALT 0 - 70 U/L 30     Thyroid Function:   !THYROID Latest Ref Rng & Units 12/14/2018   TSH 0.40 - 4.00 mU/L 2.19     Component    Latest Ref Rng & Units 10/19/2018   Thyroid Peroxidase Antibody    <35 IU/mL 11   Thyroglobulin Antibody    <40 IU/mL <20   Thyroid Stim Immunog    <=1.3 TSI index <1.0     Urine " "MicroAlbumin:  Component      Latest Ref Rng & Units 12/14/2018   Creatinine Urine      mg/dL 32   Albumin Urine mg/L      mg/L 20   Albumin Urine mg/g Cr      0 - 17 mg/g Cr 63.47 (H)     Vital Signs 4/2/2019 4/17/2019 8/29/2019   Weight (LB) 223 lb 236 lb 243 lb 3.2 oz   Height 5' 5\"  5' 6\"   BMI (Calculated) 37.11  39.25       All pertinent notes, labs, and images personally reviewed by me.     A/P  Mr.Walter Morgan is a 59 year old here for the evaluation/management of diabetes:    1. DM2 - Uncontrolled.  A1c extremely elevated at 11.6%!  Hyperglycemia likely due to dietary indiscretions - he has gained 20 lbs since 4/2019..    Diabetes is complicated by neuropathy  No blood sugar data for review today.  C/o limited use of his hands (neuropathy + tremor - both significant) and increasing difficulty with ADL's   Can't test his blood sugars using a meter due to the above.    Needs to start wearing the Nina again.   I'm checking a c-peptide today to assess pancreatic function.  Start Trulicity 0.75 mg weekly.  Plan to increase to 1.5 mg weekly in one month if tolerating.   Increase Metformin to 1000 mg bid.  Continue Lantus to 30 units every day  MTM referral provided.  I asked him to bring his Nina with to this appointment for download and review.    He had some additional concerns about left shoulder pain and nose bleeds, + BP uncontrolled - I recommended he schedule a follow up with his PCP.     2. Hypothyroidism.  Currently treated with Levoxyl (KAMERON) 125 mcg/day.  Clinically and biochemically euthyroid. Continue Levoxyl 125 mcg/day.      3.  Hypertension - Variable.      4. Hyperlipidemia - On statin therapy.    Labs ordered today:   Orders Placed This Encounter   Procedures     Hemoglobin A1c     Lipid panel reflex to direct LDL Fasting     Comprehensive metabolic panel     C-peptide     MED THERAPY MANAGE REFERRAL       Radiology/Consults ordered today: MED THERAPY MANAGE REFERRAL    All questions were " answered.  The patient indicates understanding of the above issues and agrees with the plan set forth.  Total face to face time greater than or equal to 25 minutes.       Follow-up:  3 months with me    Diana Butler NP  Endocrinology  [facility]  CC: Lisa Pierre Paull      Again, thank you for allowing me to participate in the care of your patient.        Sincerely,        BRIDGET Payne CNP

## 2019-08-29 NOTE — PATIENT INSTRUCTIONS
Component      Latest Ref Rng & Units 12/8/2017 12/14/2018 8/29/2019   Hemoglobin A1C      0 - 5.6 % 9.0 (H) 11.8 (H) 11.6 (H)       Start wearing your Nina again.  Have in downloaded when you come in to see Alta.    Increase Metformin to two tablets per day.    Start Trulicity injections once per week.    Continue the Lantus 30 units daily.    Schedule an appointment with our pharm-D Alta Murillo.    Follow up with Lisa about your shoulder pain, eye muscle twitches, and nose bleeds.    Follow up with me again in 2-3 months.    Diana Butler NP  Endocrinology

## 2019-09-01 LAB — C PEPTIDE SERPL-MCNC: 2.5 NG/ML (ref 0.9–6.9)

## 2019-09-05 NOTE — RESULT ENCOUNTER NOTE
Caio,  Your insulin measure was low compared with the elevated glucose of 316 but you are making some insulin on your own.  Cholesterol numbers were slightly abnormal but nothing significant.  You should discuss these results with your primary care provider to see if any changes in medication are indicated.  Hopefully the Trulicity helps improve your blood sugar control.  Here's a copy of the results for your records.  Diana Butler NP  Endocrinology

## 2019-10-24 ENCOUNTER — OFFICE VISIT (OUTPATIENT)
Dept: FAMILY MEDICINE | Facility: CLINIC | Age: 59
End: 2019-10-24
Payer: MEDICARE

## 2019-10-24 VITALS
OXYGEN SATURATION: 100 % | HEIGHT: 66 IN | RESPIRATION RATE: 16 BRPM | SYSTOLIC BLOOD PRESSURE: 142 MMHG | WEIGHT: 239.6 LBS | HEART RATE: 66 BPM | BODY MASS INDEX: 38.51 KG/M2 | TEMPERATURE: 97.8 F | DIASTOLIC BLOOD PRESSURE: 90 MMHG

## 2019-10-24 DIAGNOSIS — E78.5 HYPERLIPIDEMIA LDL GOAL <100: ICD-10-CM

## 2019-10-24 DIAGNOSIS — R39.11 BENIGN PROSTATIC HYPERPLASIA WITH URINARY HESITANCY: ICD-10-CM

## 2019-10-24 DIAGNOSIS — E87.1 HYPONATREMIA: ICD-10-CM

## 2019-10-24 DIAGNOSIS — R45.4 EXCESSIVE ANGER: ICD-10-CM

## 2019-10-24 DIAGNOSIS — Z23 NEED FOR PROPHYLACTIC VACCINATION AND INOCULATION AGAINST INFLUENZA: ICD-10-CM

## 2019-10-24 DIAGNOSIS — N40.1 BENIGN PROSTATIC HYPERPLASIA WITH URINARY HESITANCY: ICD-10-CM

## 2019-10-24 DIAGNOSIS — M25.512 CHRONIC LEFT SHOULDER PAIN: ICD-10-CM

## 2019-10-24 DIAGNOSIS — I10 ESSENTIAL HYPERTENSION WITH GOAL BLOOD PRESSURE LESS THAN 140/90: ICD-10-CM

## 2019-10-24 DIAGNOSIS — G89.29 CHRONIC LEFT SHOULDER PAIN: ICD-10-CM

## 2019-10-24 DIAGNOSIS — R29.898 DECREASED GRIP STRENGTH: Primary | ICD-10-CM

## 2019-10-24 DIAGNOSIS — R04.0 EPISTAXIS: ICD-10-CM

## 2019-10-24 DIAGNOSIS — R10.9 LEFT FLANK PAIN: ICD-10-CM

## 2019-10-24 LAB
ALBUMIN UR-MCNC: NEGATIVE MG/DL
ANION GAP SERPL CALCULATED.3IONS-SCNC: 5 MMOL/L (ref 3–14)
APPEARANCE UR: CLEAR
BILIRUB UR QL STRIP: NEGATIVE
BUN SERPL-MCNC: 17 MG/DL (ref 7–30)
CALCIUM SERPL-MCNC: 8.8 MG/DL (ref 8.5–10.1)
CHLORIDE SERPL-SCNC: 98 MMOL/L (ref 94–109)
CO2 SERPL-SCNC: 26 MMOL/L (ref 20–32)
COLOR UR AUTO: YELLOW
CREAT SERPL-MCNC: 1.09 MG/DL (ref 0.66–1.25)
GFR SERPL CREATININE-BSD FRML MDRD: 73 ML/MIN/{1.73_M2}
GLUCOSE SERPL-MCNC: 285 MG/DL (ref 70–99)
GLUCOSE UR STRIP-MCNC: 500 MG/DL
HGB UR QL STRIP: NEGATIVE
KETONES UR STRIP-MCNC: NEGATIVE MG/DL
LEUKOCYTE ESTERASE UR QL STRIP: NEGATIVE
NITRATE UR QL: NEGATIVE
PH UR STRIP: 5 PH (ref 5–7)
POTASSIUM SERPL-SCNC: 5 MMOL/L (ref 3.4–5.3)
SODIUM SERPL-SCNC: 129 MMOL/L (ref 133–144)
SOURCE: ABNORMAL
SP GR UR STRIP: <=1.005 (ref 1–1.03)
UROBILINOGEN UR STRIP-ACNC: 0.2 EU/DL (ref 0.2–1)

## 2019-10-24 PROCEDURE — 80048 BASIC METABOLIC PNL TOTAL CA: CPT | Performed by: PHYSICIAN ASSISTANT

## 2019-10-24 PROCEDURE — G0008 ADMIN INFLUENZA VIRUS VAC: HCPCS | Performed by: PHYSICIAN ASSISTANT

## 2019-10-24 PROCEDURE — 81003 URINALYSIS AUTO W/O SCOPE: CPT | Performed by: PHYSICIAN ASSISTANT

## 2019-10-24 PROCEDURE — 36415 COLL VENOUS BLD VENIPUNCTURE: CPT | Performed by: PHYSICIAN ASSISTANT

## 2019-10-24 PROCEDURE — 90682 RIV4 VACC RECOMBINANT DNA IM: CPT | Performed by: PHYSICIAN ASSISTANT

## 2019-10-24 PROCEDURE — 99214 OFFICE O/P EST MOD 30 MIN: CPT | Mod: 25 | Performed by: PHYSICIAN ASSISTANT

## 2019-10-24 RX ORDER — CITALOPRAM HYDROBROMIDE 20 MG/1
TABLET ORAL
Qty: 90 TABLET | Refills: 1 | Status: SHIPPED | OUTPATIENT
Start: 2019-10-24 | End: 2020-01-16 | Stop reason: DRUGHIGH

## 2019-10-24 RX ORDER — FINASTERIDE 5 MG/1
1 TABLET, FILM COATED ORAL DAILY
Qty: 90 TABLET | Refills: 1 | Status: SHIPPED | OUTPATIENT
Start: 2019-10-24 | End: 2020-01-16

## 2019-10-24 RX ORDER — BUPROPION HYDROCHLORIDE 300 MG/1
300 TABLET ORAL EVERY MORNING
Qty: 90 TABLET | Refills: 1 | Status: SHIPPED | OUTPATIENT
Start: 2019-10-24 | End: 2020-01-16

## 2019-10-24 RX ORDER — ATENOLOL 25 MG/1
25 TABLET ORAL DAILY
Qty: 90 TABLET | Refills: 0 | Status: SHIPPED | OUTPATIENT
Start: 2019-10-24 | End: 2020-01-16

## 2019-10-24 RX ORDER — SIMVASTATIN 20 MG
20 TABLET ORAL AT BEDTIME
Qty: 90 TABLET | Refills: 3 | Status: SHIPPED | OUTPATIENT
Start: 2019-10-24 | End: 2021-05-13

## 2019-10-24 RX ORDER — CYCLOBENZAPRINE HCL 10 MG
10 TABLET ORAL 3 TIMES DAILY PRN
Qty: 30 TABLET | Refills: 1 | Status: SHIPPED | OUTPATIENT
Start: 2019-10-24 | End: 2020-01-16

## 2019-10-24 RX ORDER — MUPIROCIN 20 MG/G
OINTMENT TOPICAL 3 TIMES DAILY
Qty: 22 G | Refills: 0 | Status: SHIPPED | OUTPATIENT
Start: 2019-10-24 | End: 2022-10-18

## 2019-10-24 RX ORDER — LOSARTAN POTASSIUM 50 MG/1
50 TABLET ORAL DAILY
Qty: 90 TABLET | Refills: 3 | Status: SHIPPED | OUTPATIENT
Start: 2019-10-24 | End: 2021-05-13

## 2019-10-24 ASSESSMENT — ANXIETY QUESTIONNAIRES
6. BECOMING EASILY ANNOYED OR IRRITABLE: MORE THAN HALF THE DAYS
2. NOT BEING ABLE TO STOP OR CONTROL WORRYING: NOT AT ALL
3. WORRYING TOO MUCH ABOUT DIFFERENT THINGS: NOT AT ALL
7. FEELING AFRAID AS IF SOMETHING AWFUL MIGHT HAPPEN: SEVERAL DAYS
4. TROUBLE RELAXING: SEVERAL DAYS
5. BEING SO RESTLESS THAT IT IS HARD TO SIT STILL: NOT AT ALL
7. FEELING AFRAID AS IF SOMETHING AWFUL MIGHT HAPPEN: SEVERAL DAYS
1. FEELING NERVOUS, ANXIOUS, OR ON EDGE: NOT AT ALL
GAD7 TOTAL SCORE: 4

## 2019-10-24 ASSESSMENT — PATIENT HEALTH QUESTIONNAIRE - PHQ9
SUM OF ALL RESPONSES TO PHQ QUESTIONS 1-9: 13
SUM OF ALL RESPONSES TO PHQ QUESTIONS 1-9: 13
10. IF YOU CHECKED OFF ANY PROBLEMS, HOW DIFFICULT HAVE THESE PROBLEMS MADE IT FOR YOU TO DO YOUR WORK, TAKE CARE OF THINGS AT HOME, OR GET ALONG WITH OTHER PEOPLE: VERY DIFFICULT

## 2019-10-24 ASSESSMENT — MIFFLIN-ST. JEOR: SCORE: 1836.82

## 2019-10-24 ASSESSMENT — PAIN SCALES - GENERAL: PAINLEVEL: NO PAIN (0)

## 2019-10-24 NOTE — PROGRESS NOTES
Subjective     Steve Morgan is a 59 year old male who presents to clinic today for the following health issues:    Shoulder Pain     Epistaxis     History of Present Illness        He eats 2-3 servings of fruits and vegetables daily.He consumes 0 sweetened beverage(s) daily.He is missing 7 dose(s) of medications per week.  He is not taking prescribed medications regularly due to other.     Diabetes Follow-up      How often are you checking your blood sugar? Not at all    What time of day are you checking your blood sugars (select all that apply)? Not checking blood sugar    Have you had any blood sugars above 200?  Unknown     Have you had any blood sugars below 70?  Unknown    What symptoms do you notice when your blood sugar is low?  Shaky and Weak    What concerns do you have today about your diabetes? None     Do you have any of these symptoms? (Select all that apply)  Numbness in feet     Have you had a diabetic eye exam in the last 12 months? Yes- Date of last eye exam: 006/2019 @ [facility]    Diabetes Management Resources    Hyperlipidemia Follow-Up      Are you having any of the following symptoms? (Select all that apply)  Shortness of breath and Increased sweating or nausea with activity    Are you regularly taking any medication or supplement to lower your cholesterol?   Yes-     Are you having muscle aches or other side effects that you think could be caused by your cholesterol lowering medication?  No    Hypertension Follow-up      Do you check your blood pressure regularly outside of the clinic? No     Are you following a low salt diet? No    Are your blood pressures ever more than 140 on the top number (systolic) OR more   than 90 on the bottom number (diastolic), for example 140/90? Yes    BP Readings from Last 2 Encounters:   10/24/19 (!) 142/90   08/29/19 (!) 141/83     Hemoglobin A1C (%)   Date Value   08/29/2019 11.6 (H)   12/14/2018 11.8 (H)     LDL Cholesterol Calculated  "(mg/dL)   Date Value   08/29/2019 121 (H)   09/18/2018 114 (H)         How many servings of fruits and vegetables do you eat daily?  0-1    On average, how many sweetened beverages do you drink each day (soda, juice, sweet tea, etc)?   0  How many days per week do you miss taking your medication? 7    What makes it hard for you to take your medications?  not wanting to take them        ENT Symptoms             Symptoms: cc Present Absent Comment   Fever/Chills       Fatigue       Muscle Aches       Eye Irritation       Sneezing       Nasal Edwin/Drg       Sinus Pressure/Pain       Loss of smell       Dental pain       Sore Throat       Swollen Glands       Ear Pain/Fullness       Cough       Wheeze       Chest Pain       Shortness of breath       Rash       Other         Symptom duration:  Started 6-9 months ago lasting \"only a minute or so\"   Symptom severity:  mild   Treatments tried:  putting head back and using nasal tissue   Contacts:  none        Joint Pain    Onset: Started two years ago    Description:   Location: left shoulder  Character: Dull ache    Intensity: 5/10    Progression of Symptoms: intermittent    Accompanying Signs & Symptoms:  Other symptoms: radiation of pain to down to arm    History:     Previous similar pain:none    Precipitating factors:   Trauma or overuse: YES    Alleviating factors:  Improved by: nothing    Therapies Tried and outcome: none    Also 2 months of left flank pain. Improved with massage. No change with food or movement. No urination changes. No known trauma. Of note, does admit to increased shortness of breath with activities such as picking up leaves. No chest pain or diaphoresis. Did have stress testing performed in 2018 and was normal.         Patient Active Problem List   Diagnosis     Anemia     Morbid obesity due to excess calories (H)     Sleep apnea     Neuropathy in diabetes (H)     Hypothyroidism     HYPERLIPIDEMIA LDL GOAL <100     Hypertension goal BP (blood " pressure) < 140/90     Peripheral arterial disease (H)     Tremor     Peripheral neuropathy     Excessive anger     Generalized muscle weakness     Health Care Home     Unsteady gait     DAMIÁN (obstructive sleep apnea)     Vitamin B12 deficiency without anemia     Hyponatremia     Chronic hyponatremia     Type 2 diabetes mellitus with diabetic neuropathy (H)     Depression     Cellulitis and abscess of trunk     Benign prostatic hyperplasia with urinary hesitancy     Past Surgical History:   Procedure Laterality Date     COLONOSCOPY       GENITOURINARY SURGERY      surg for undescended testicle     REPAIR HAMMER TOE BILATERAL  5/16/2013    Procedure: REPAIR HAMMER TOE BILATERAL;  Flexor Tenotomy Toes 2,3,4,5 Bilateral Feet;  Surgeon: Saad Bangura DPM;  Location:  OR       Social History     Tobacco Use     Smoking status: Never Smoker     Smokeless tobacco: Never Used   Substance Use Topics     Alcohol use: Yes     Comment: occ     Family History   Problem Relation Age of Onset     Breast Cancer Mother      Hypertension Mother      Thyroid Disease Mother      Depression Mother      Alzheimer Disease Mother      Cancer - colorectal Father      Thyroid Disease Father      Depression Father      Heart Disease Maternal Grandmother         CHF     Circulatory Paternal Grandmother      Cancer Paternal Grandfather      Diabetes Brother      Diabetes Brother          Current Outpatient Medications   Medication Sig Dispense Refill     atenolol (TENORMIN) 25 MG tablet Take 1 tablet (25 mg) by mouth daily 90 tablet 0     buPROPion (WELLBUTRIN XL) 300 MG 24 hr tablet Take 1 tablet (300 mg) by mouth every morning 90 tablet 1     citalopram (CELEXA) 20 MG tablet TAKE 1 TABLET (20 MG) BY MOUTH DAILY 90 tablet 1     cyclobenzaprine (FLEXERIL) 10 MG tablet Take 1 tablet (10 mg) by mouth 3 times daily as needed for muscle spasms 30 tablet 1     finasteride (PROSCAR) 5 MG tablet Take 1 tablet (5 mg) by mouth daily 90 tablet 1      losartan (COZAAR) 50 MG tablet Take 1 tablet (50 mg) by mouth daily 90 tablet 3     mupirocin (BACTROBAN) 2 % external ointment Apply topically 3 times daily 22 g 0     simvastatin (ZOCOR) 20 MG tablet Take 1 tablet (20 mg) by mouth At Bedtime 90 tablet 3     ammonium lactate (AMLACTIN) 12 % external cream Apply topically daily Apply to feet daily 385 g 2     ammonium lactate (LAC-HYDRIN) 12 % cream Apply topically 2 times daily as needed for dry skin 385 g 3     ASPIRIN 81 MG OR TABS ONE DAILY 100 3     blood glucose (ONE TOUCH VERIO IQ) test strip Use to test blood sugars 2 times daily or as directed. 100 strip 0     blood glucose monitoring (KERLINE CONTOUR NEXT) test strip Use to test blood sugar 3 times daily or as directed. 100 strip 11     blood glucose monitoring (KERLINE MICROLET) lancets Use to test blood sugar 3 times daily or as directed. 100 each 11     Calcium Carb-Cholecalciferol (CALCIUM 600 + D PO) Take 1 tablet by mouth daily       Continuous Blood Gluc  (FREESTYLE GIULIANA 14 DAY READER) SIENA 1 each continuous 1 Device 0     Continuous Blood Gluc Sensor ([employer]YLE GIULIANA 14 DAY SENSOR) MISC 1 each every 14 days 2 each 11     Cyanocobalamin (VITAMIN B 12 PO) Take 250 mcg by mouth daily       dulaglutide (TRULICITY) 0.75 MG/0.5ML pen Inject 0.75 mg Subcutaneous every 7 days 2 mL 1     ferrous sulfate 325 (65 FE) MG tablet Take 1 tablet by mouth daily (with breakfast).       hydrocortisone (WESTCORT) 0.2 % cream Apply topically 2 times daily as needed Apply sparingly to affected area, BID. 45 g 1     insulin glargine (LANTUS SOLOSTAR) 100 UNIT/ML pen 30 units qd.  Increase as directed to max dose of 45 units qd. 45 mL 1     insulin pen needle (B-D U/F) 31G X 5 MM miscellaneous USE 1 DAILY OR AS DIRECTED. 100 each 3     levothyroxine (LEVOXYL) 125 MCG tablet Take 1 tablet (125 mcg) by mouth daily Dispense name brand Levoxyl, no generics 90 tablet 3     metFORMIN (GLUCOPHAGE) 1000 MG tablet TAKE 1  TABLET (1,000 MG) BY MOUTH 2 TIMES DAILY (WITH MEALS) 180 tablet 3     MULTI-VITAMIN OR TABS 1 TABLET DAILY 30 0     omeprazole-sodium bicarbonate (ZEGERID)  MG per capsule Take 1 capsule by mouth every morning (before breakfast)       order for DME Equipment being ordered: Please dispense up to 3 pairs of knee high compression stockings at 20-30 mmHg and one donning device if needed. 4 Device 0     order for DME Equipment being ordered: Lt wrist splint 1 Device 0     ORDER FOR DME Equipment being ordered: four pronged cane 1 Units 0     ORDER FOR DME Equipment being ordered: single cane with rubber foot 1 Units 0     UNABLE TO FIND MEDICATION NAME: Prednisolone Acetate, Gatifloxacin and Bromfenac 1/0.5/0.075% - instill one drop in the surgery eye three times daily beginning 2 days before surgery       VITAMIN D, CHOLECALCIFEROL, PO Take 1,000 Units by mouth daily.       No Known Allergies  Recent Labs   Lab Test 08/29/19  0927 12/14/18  0924 10/19/18  1046 09/18/18  0921  06/28/18  0845  05/17/17  1002   A1C 11.6* 11.8*  --   --   --  9.0*   < > 10.0*   *  --   --  114*  --   --   --  126*   HDL 34*  --   --  30*  --   --   --  30*   TRIG 186*  --   --  219*  --   --   --  179*   ALT 28  --   --  30  --   --   --  35   CR 1.18  --   --  1.08  --   --    < > 1.02   GFRESTIMATED 66  --   --  70  --   --    < > 75   GFRESTBLACK 76  --   --  84  --   --    < > >90   GFR Calc     POTASSIUM 4.5  --   --  5.3  --   --    < > 4.6   TSH  --  2.19 0.30*  --    < > 0.02*   < >  --     < > = values in this interval not displayed.      BP Readings from Last 3 Encounters:   10/24/19 (!) 142/90   08/29/19 (!) 141/83   04/17/19 138/88    Wt Readings from Last 3 Encounters:   10/24/19 108.7 kg (239 lb 9.6 oz)   08/29/19 110.3 kg (243 lb 3.2 oz)   04/17/19 107 kg (236 lb)                    Reviewed and updated as needed this visit by Provider  Tobacco  Allergies  Meds  Problems  Med Hx  Surg Hx   "Fam Hx         Review of Systems   HENT: Positive for nosebleeds.       ROS COMP: Constitutional, HEENT, cardiovascular, pulmonary, GI, , musculoskeletal, neuro, skin, endocrine and psych systems are negative, except as otherwise noted.      Objective    BP (!) 142/90   Pulse 66   Temp 97.8  F (36.6  C) (Oral)   Resp 16   Ht 1.68 m (5' 6.14\")   Wt 108.7 kg (239 lb 9.6 oz)   SpO2 100%   BMI 38.51 kg/m    Body mass index is 38.51 kg/m .  Physical Exam   GENERAL: alert, no distress and obese  HEENT: bilateral nares clear without ulcerations or edema.   RESP: lungs clear to auscultation - no rales, rhonchi or wheezes  CV: regular rates and rhythm, normal S1 S2, no S3 or S4 and no murmur, click or rub  ABDOMEN: soft, nontender, no hepatosplenomegaly, no masses and bowel sounds normal  MS: no tenderness to palpation and full rom and strength of left shoulder and neck. Negative spurling's on left. Negative empty can, lift off, neers, cross adduction, speeds, hawkin's, and painful arch. 4/5  strength on left and right.   NEURO: Normal strength and tone, mentation intact and gait abnormal: at baseline with cane   BACK: no CVA tenderness, no paralumbar tenderness  PSYCH: mentation appears normal, affect normal/bright    Diagnostic Test Results:  none         Assessment & Plan     (R24.142) Decreased  strength  (primary encounter diagnosis)  Comment: present on exam and deformity present as well. Possible arthritis. Recommending ot.   Plan: [facility] REFERR[facility]            (I10) Essential hypertension with goal blood pressure less than 140/90  Comment: uncontrolled, but noncompliant with regimen. Will restart. Follow up appt scheduled in 2 weeks in clinic. Recheck bp at this visit.   Plan: atenolol (TENORMIN) 25 MG tablet, losartan         (COZAAR) 50 MG tablet        -Medication use and side effects discussed with the patient. Patient is in complete understanding and agreement with plan. "       (R45.4) Excessive anger  Comment: stable   Plan: buPROPion (WELLBUTRIN XL) 300 MG 24 hr tablet,         citalopram (CELEXA) 20 MG tablet            (N40.1,  R39.11) Benign prostatic hyperplasia with urinary hesitancy  Comment: stable   Plan: finasteride (PROSCAR) 5 MG tablet            (E78.5) Hyperlipidemia LDL goal <100  Comment: not controlled at diabetes visit, but not taking zocor. Educated on compliance. Refilled. Recommending recheck in 3 months.   Plan: simvastatin (ZOCOR) 20 MG tablet        -Medication use and side effects discussed with the patient. Patient is in complete understanding and agreement with plan.       (M25.512,  G89.29) Chronic left shoulder pain  Comment: unclear cause. Exam benign. May be chronic strain due to cane use on left. However, cervical radiculopathy considered though no obvious signs of this on exam. Recommending physical therapy with prn flexeril. If no improvement in 1 month, cervical MRI to be considered.   Plan: cyclobenzaprine (FLEXERIL) 10 MG tablet, [facility], [facility] CHIROPRACTIC REFERRAL            (R04.0) Epistaxis  Comment: exam benign. Unclear cause. No active bleeding. Described as short. Possible recurring scabbing. History of mrsa. bactroban trial recommended. If no improvement in 5 days, follow with his ent.   Plan: mupirocin (BACTROBAN) 2 % external ointment        -Medication use and side effects discussed with the patient. Patient is in complete understanding and agreement with plan.       (R10.9) Left flank pain  Comment: exam benign. Recheck bmp for renal panel as well as UA. However reassuring exam for renal etiology. msk etiology possible given improvement with massage. May be related to shoulder etiology from pain. acs considered as atypical presentation given diabetes and risk factors, however reassured against this with normal stress testing in the last year. If work up negative and no improvement with physical therapy, further imaging may  "be needed.   Plan: *UA reflex to Microscopic and Culture ([place]         and [facility] (except [place] and         [place]), Basic metabolic panel          (E87.1) Hyponatremia  Comment: ongoing and chronic. Work up in the past has been negative. Unclear cause. Likely related to hyperglycemia. Corrected sodium at last cmp was 135. No concerns at this time.    Plan:        BMI:   Estimated body mass index is 38.51 kg/m  as calculated from the following:    Height as of this encounter: 1.68 m (5' 6.14\").    Weight as of this encounter: 108.7 kg (239 lb 9.6 oz).   Weight management plan: Patient referred to endocrine and/or weight management specialty Discussed healthy diet and exercise guidelines      Return in about 6 months (around 4/24/2020).    Jose Miguel Lambert PA-C  [facility]    Answers for HPI/ROS submitted by the patient on 10/24/2019   Chronic problems general questions HPI Form  If you checked off any problems, how difficult have these problems made it for you to do your work, take care of things at home, or get along with other people?: Very difficult  PHQ9 TOTAL SCORE: 13  JOSE MANUEL 7 TOTAL SCORE: 4    "

## 2019-10-25 ASSESSMENT — PATIENT HEALTH QUESTIONNAIRE - PHQ9: SUM OF ALL RESPONSES TO PHQ QUESTIONS 1-9: 13

## 2019-10-25 ASSESSMENT — ANXIETY QUESTIONNAIRES: GAD7 TOTAL SCORE: 4

## 2019-11-04 ENCOUNTER — OFFICE VISIT (OUTPATIENT)
Dept: FAMILY MEDICINE | Facility: CLINIC | Age: 59
End: 2019-11-04
Payer: MEDICARE

## 2019-11-04 VITALS
WEIGHT: 239.13 LBS | BODY MASS INDEX: 38.43 KG/M2 | TEMPERATURE: 97.7 F | DIASTOLIC BLOOD PRESSURE: 78 MMHG | OXYGEN SATURATION: 97 % | SYSTOLIC BLOOD PRESSURE: 138 MMHG | HEART RATE: 80 BPM

## 2019-11-04 DIAGNOSIS — Z71.84 ENCOUNTER FOR COUNSELING FOR TRAVEL: Primary | ICD-10-CM

## 2019-11-04 PROCEDURE — 99401 PREV MED CNSL INDIV APPRX 15: CPT | Performed by: PHYSICIAN ASSISTANT

## 2019-11-04 RX ORDER — AZITHROMYCIN 500 MG/1
TABLET, FILM COATED ORAL
Qty: 3 TABLET | Refills: 0 | Status: SHIPPED | OUTPATIENT
Start: 2019-11-04 | End: 2020-01-16

## 2019-11-04 NOTE — PATIENT INSTRUCTIONS
"See travel packet provided  Recommend ultrathon (mosquito repellant), pepto bismol and imodium  The food and drink choices you make while traveling can impact your likelihood of getting sick.   If you aren't sure if a food or drink is safe, the saying \" BOIL IT, COOK IT, PEEL IT, OR FORGET IT\" can help you decide whether it's okay to consume.   Also bring hand  and sun screen with you.  Safe Travels         "

## 2019-11-04 NOTE — PROGRESS NOTES
SUBJECTIVE: Steve Morgan , a 59 year old  male, presents for counseling and information regarding upcoming travel to [place]. Special medical concerns include: none. He anticipates the following unusual exposures: none.    Itinerary:  [place]     Departure Date: 11/16/19 Return date: 11/23/19    Reason for travel (i.e. Business, pleasure): pleasure     Visiting an urban or rural area?: resort     Accommodations (i.e. hotel, hostel, friends, family, etc): resort       IMMUNIZATION HISTORY  Have you received any vaccinations in the past 4 weeks?  Yes  Have you ever fainted from having your blood drawn or from an injection?  No  Have you ever had a fever reaction to vaccination?  No  Have you ever had any bad reaction or side effect from any vaccination?  No  Have you ever had hepatitis A or B vaccine?  Yes  Do you live (or work closely) with anyone who has AIDS, an AIDS-like condition, any other immune disorder or who is on chemotherapy for cancer?  No  Have you received any injection of immune globulin or any blood products during the past 12 months?  No    GENERAL MEDICAL HISTORY  Do you have a medical condition that warrants maintenance medication or physician follow-up?  Yes  Do you have a medical condition that is stable now, but that may recur while traveling?  No  Has your spleen been removed?  No  Have you had an acute illness or a fever in the past 48 hours?  No  Are you pregnant, or might you become pregnant on this trip?  Any chance of pregnancy?  No  Are you breastfeeding?  No  Do you have HIV, AIDS, an AIDS-like condition, any other immune disorder, leukemia or cancer?  No  Do you have a severe combined immunodeficiency disease?  No  Have you had your thymus gland removed or history of problems with your thymus, such as myasthenia gravis, DiGeorge syndrome, or thymoma?  No    Do you have severe thrombocytopenia (low platelet count) or a coagulation disorder?  No  Have you ever had a convulsion, seizure,  epilepsy, neurologic condition or brain infection?  Yes  Do you have any stomach conditions?  No  Do you have a G6PD deficiency?  No  Do you have severe renal or kidney impairment?  No  Do you have a history of psychiatric problems?  No  Do you have a problem with strange dreams and/or nightmares?  No  Do you have insomnia?  No  Do you have problems with vaginitis?  No  Do you have psoriasis?  No  Are you prone to motion sickness?  No  Have you ever had headaches, nausea, vomiting, or breathing problems from altitude exposure?  No      Past Medical History:   Diagnosis Date     Cellulitis and abscess of trunk 6/27/2017     Depression      Hyperlipidemia LDL goal <100 10/31/2010     Hypertension goal BP (blood pressure) < 140/90 3/17/2011     Hypothyroidism 1/12/2010     Morbid obesity due to excess calories (H) 1/12/2010     Neuropathy in diabetes (H) 1/12/2010     Obesity 1/12/2010     Sleep apnea 1/12/2010    CPAP     Type 2 diabetes, HbA1C goal < 8% (H) 3/8/2011      Immunization History   Administered Date(s) Administered     HepA-Adult 10/16/2018, 04/17/2019     Influenza (H1N1) 11/04/2009     Influenza (IIV3) PF 11/12/1997, 10/29/1998, 11/03/1999, 11/08/2001, 10/11/2002, 10/18/2004, 10/26/2007, 11/24/2008, 11/08/2010, 10/01/2012     Influenza Quad, Recombinant, p-free (RIV4) 09/07/2018, 10/24/2019     Influenza Vaccine IM > 6 months Valent IIV4 11/05/2013, 10/22/2014, 11/17/2015, 09/07/2017     Influenza Vaccine, 3 YRS +, IM (QUADRIVALENT W/PRESERVATIVES) 11/15/2016     MMR 03/21/1995     Pneumococcal 23 valent 10/26/2007, 10/05/2012     TD (ADULT, 7+) 03/21/1995     TDAP Vaccine (Adacel) 11/08/2010     Typhoid IM 10/16/2018       Current Outpatient Medications   Medication Sig Dispense Refill     ammonium lactate (AMLACTIN) 12 % external cream Apply topically daily Apply to feet daily 385 g 2     ammonium lactate (LAC-HYDRIN) 12 % cream Apply topically 2 times daily as needed for dry skin 385 g 3     ASPIRIN  81 MG OR TABS ONE DAILY 100 3     atenolol (TENORMIN) 25 MG tablet Take 1 tablet (25 mg) by mouth daily 90 tablet 0     blood glucose (ONE TOUCH VERIO IQ) test strip Use to test blood sugars 2 times daily or as directed. 100 strip 0     blood glucose monitoring (KERLINE CONTOUR NEXT) test strip Use to test blood sugar 3 times daily or as directed. 100 strip 11     blood glucose monitoring (KERLINE MICROLET) lancets Use to test blood sugar 3 times daily or as directed. 100 each 11     buPROPion (WELLBUTRIN XL) 300 MG 24 hr tablet Take 1 tablet (300 mg) by mouth every morning 90 tablet 1     Calcium Carb-Cholecalciferol (CALCIUM 600 + D PO) Take 1 tablet by mouth daily       citalopram (CELEXA) 20 MG tablet TAKE 1 TABLET (20 MG) BY MOUTH DAILY 90 tablet 1     Continuous Blood Gluc  ([employer] GIULIANA 14 DAY READER) SIENA 1 each continuous 1 Device 0     Continuous Blood Gluc Sensor ([employer] GIULIANA 14 DAY SENSOR) MISC 1 each every 14 days 2 each 11     Cyanocobalamin (VITAMIN B 12 PO) Take 250 mcg by mouth daily       cyclobenzaprine (FLEXERIL) 10 MG tablet Take 1 tablet (10 mg) by mouth 3 times daily as needed for muscle spasms 30 tablet 1     dulaglutide (TRULICITY) 0.75 MG/0.5ML pen Inject 0.75 mg Subcutaneous every 7 days 2 mL 1     ferrous sulfate 325 (65 FE) MG tablet Take 1 tablet by mouth daily (with breakfast).       finasteride (PROSCAR) 5 MG tablet Take 1 tablet (5 mg) by mouth daily 90 tablet 1     hydrocortisone (WESTCORT) 0.2 % cream Apply topically 2 times daily as needed Apply sparingly to affected area, BID. 45 g 1     insulin glargine (LANTUS SOLOSTAR) 100 UNIT/ML pen 30 units qd.  Increase as directed to max dose of 45 units qd. 45 mL 1     insulin pen needle (B-D U/F) 31G X 5 MM miscellaneous USE 1 DAILY OR AS DIRECTED. 100 each 3     levothyroxine (LEVOXYL) 125 MCG tablet Take 1 tablet (125 mcg) by mouth daily Dispense name brand Levoxyl, no generics 90 tablet 3     losartan (COZAAR) 50 MG tablet  Take 1 tablet (50 mg) by mouth daily 90 tablet 3     metFORMIN (GLUCOPHAGE) 1000 MG tablet TAKE 1 TABLET (1,000 MG) BY MOUTH 2 TIMES DAILY (WITH MEALS) 180 tablet 3     MULTI-VITAMIN OR TABS 1 TABLET DAILY 30 0     mupirocin (BACTROBAN) 2 % external ointment Apply topically 3 times daily 22 g 0     omeprazole-sodium bicarbonate (ZEGERID)  MG per capsule Take 1 capsule by mouth every morning (before breakfast)       order for DME Equipment being ordered: Please dispense up to 3 pairs of knee high compression stockings at 20-30 mmHg and one donning device if needed. 4 Device 0     order for DME Equipment being ordered: Lt wrist splint 1 Device 0     ORDER FOR DME Equipment being ordered: four pronged cane 1 Units 0     ORDER FOR DME Equipment being ordered: single cane with rubber foot 1 Units 0     simvastatin (ZOCOR) 20 MG tablet Take 1 tablet (20 mg) by mouth At Bedtime 90 tablet 3     UNABLE TO FIND MEDICATION NAME: Prednisolone Acetate, Gatifloxacin and Bromfenac 1/0.5/0.075% - instill one drop in the surgery eye three times daily beginning 2 days before surgery       VITAMIN D, CHOLECALCIFEROL, PO Take 1,000 Units by mouth daily.       No Known Allergies     EXAM: deferred    Immunizations discussed include: none needed  Malaraia prophylaxis recommended: not needed- [place]  Symptomatic treatment for traveler's diarrhea: bismuth subsalicylate, loperamide/diphenoxylate and azithromycin    ASSESSMENT/PLAN:    (Z71.84) Encounter for counseling for travel  (primary encounter diagnosis)    Comment: No vaccines today. Patient will return or follow-up with PCP as needed. Prophylaxis given for Traveler's diarrhea and is not needed for Malaria. All questions were answered.     Plan: azithromycin (ZITHROMAX) 500 MG tablet              I have reviewed general recommendations for safe travel   including: food/water precautions, insect avoidance, safe sex   practices given high prevalence of HIV and other STDs,    roadway safety. Educational materials and links to the [facility]   Traveler's health website have been provided.    Total time 15 minutes, greater than 50 percent in counseling   and coordination of care.

## 2019-11-07 ENCOUNTER — HEALTH MAINTENANCE LETTER (OUTPATIENT)
Age: 59
End: 2019-11-07

## 2019-11-15 ENCOUNTER — OFFICE VISIT (OUTPATIENT)
Dept: URGENT CARE | Facility: URGENT CARE | Age: 59
End: 2019-11-15
Payer: MEDICARE

## 2019-11-15 VITALS
SYSTOLIC BLOOD PRESSURE: 138 MMHG | HEART RATE: 80 BPM | WEIGHT: 246.3 LBS | TEMPERATURE: 98.2 F | DIASTOLIC BLOOD PRESSURE: 74 MMHG | BODY MASS INDEX: 39.58 KG/M2 | OXYGEN SATURATION: 98 %

## 2019-11-15 DIAGNOSIS — L03.012 CELLULITIS OF LEFT INDEX FINGER: Primary | ICD-10-CM

## 2019-11-15 DIAGNOSIS — L02.512 ABSCESS OF LEFT INDEX FINGER: ICD-10-CM

## 2019-11-15 PROCEDURE — 87070 CULTURE OTHR SPECIMN AEROBIC: CPT | Performed by: PHYSICIAN ASSISTANT

## 2019-11-15 PROCEDURE — 96372 THER/PROPH/DIAG INJ SC/IM: CPT | Performed by: PHYSICIAN ASSISTANT

## 2019-11-15 PROCEDURE — 87077 CULTURE AEROBIC IDENTIFY: CPT | Performed by: PHYSICIAN ASSISTANT

## 2019-11-15 PROCEDURE — 10060 I&D ABSCESS SIMPLE/SINGLE: CPT | Mod: 59 | Performed by: PHYSICIAN ASSISTANT

## 2019-11-15 PROCEDURE — 87186 SC STD MICRODIL/AGAR DIL: CPT | Performed by: PHYSICIAN ASSISTANT

## 2019-11-15 PROCEDURE — 99213 OFFICE O/P EST LOW 20 MIN: CPT | Mod: 25 | Performed by: PHYSICIAN ASSISTANT

## 2019-11-15 RX ORDER — CEFTRIAXONE SODIUM 1 G
1 VIAL (EA) INJECTION ONCE
Status: COMPLETED | OUTPATIENT
Start: 2019-11-15 | End: 2019-11-15

## 2019-11-15 RX ORDER — SULFAMETHOXAZOLE/TRIMETHOPRIM 800-160 MG
1 TABLET ORAL 2 TIMES DAILY
Qty: 20 TABLET | Refills: 0 | Status: SHIPPED | OUTPATIENT
Start: 2019-11-15 | End: 2020-01-16

## 2019-11-15 RX ADMIN — Medication 1 G: at 20:56

## 2019-11-16 NOTE — PATIENT INSTRUCTIONS
Keep affected area clean. Put bacitracin ointment on. Change bandage daily. Let it open to air at night.      Patient Education     Cellulitis  Cellulitis is an infection of the deep layers of skin. A break in the skin, such as a cut or scratch, can let bacteria under the skin. If the bacteria get to deep layers of the skin, it can be serious. If not treated, cellulitis can get into the bloodstream and lymph nodes. The infection can then spread throughout the body. This causes serious illness.  Cellulitis causes the affected skin to become red, swollen, warm, and sore. The reddened areas have a visible border. An open sore may leak fluid (pus). You may have a fever, chills, and pain.  Cellulitis is treated with antibiotics taken for 7 to 10 days. An open sore may be cleaned and covered with cool wet gauze. Symptoms should get better 1 to 2 days after treatment is started. Make sure to take all the antibiotics for the full number of days until they are gone. Keep taking the medicine even if your symptoms go away.  Home care  Follow these tips:    Limit the use of the part of your body with cellulitis.     If the infection is on your leg, keep your leg raised while sitting. This will help to reduce swelling.    Take all of the antibiotic medicine exactly as directed until it is gone. Do not miss any doses, especially during the first 7 days. Don t stop taking the medicine when your symptoms get better.    Keep the affected area clean and dry.    Wash your hands with soap and warm water before and after touching your skin. Anyone else who touches your skin should also wash his or her hands. Don't share towels.  Follow-up care  Follow up with your healthcare provider, or as advised. If your infection does not go away on the first antibiotic, your healthcare provider will prescribe a different one.  When to seek medical advice  Call your healthcare provider right away if any of these occur:    Red areas that  spread    Swelling or pain that gets worse    Fluid leaking from the skin (pus)    Fever higher of 100.4  F (38.0  C) or higher after 2 days on antibiotics  Date Last Reviewed: 9/1/2016 2000-2018 [employer]. [address] [place] [address], [place] [address]. All rights reserved. This information is not intended as a substitute for professional medical care. Always follow your healthcare professional's instructions.

## 2019-11-16 NOTE — PROGRESS NOTES
Clinic Administered Medication Documentation    MEDICATION LIST:   Injectable Medication Documentation    Patient was given Ceftriaxone Sodium (Rocephin). Prior to medication administration, verified patients identity using patient s name and date of birth. Please see MAR and medication order for additional information. Patient instructed to remain in clinic for 15 minutes.      Was entire vial of medication used? Yes  Vial/Syringe: Single dose vial  Expiration Date:  10/2020  Was this medication supplied by the patient? No     Joellen Fay CMA

## 2019-11-16 NOTE — PROGRESS NOTES
"SUBJECTIVE:   Steve Morgan is a 59 year old male presenting with a chief complaint of   Chief Complaint   Patient presents with     Urgent Care     Finger     Left index finger-Concerns of infection-Swelling, \"Black\" redness-Note-Patient is a diabetic       He is an established patient of [facility].    Finger problem    Onset of symptoms was 3 day(s) ago.  Course of illness is worsening.    Severity moderate  Current and Associated symptoms: left index finger swelling, erythema, and pain. He noted some drainage from it last night. None since then.   Denies any fevers or chills.  Treatment measures tried include None tried.  Predisposing factors include: He is a type II diabetic.  Notes that he was raking the leaves a few days prior, he suspect he may have sustained a scratch in the process at the tip of the finger.  He has a history of MRSA.  He is traveling to [place] tomorrow, reason which prompted this visit tonight.  Patient is here accompanied by his daughter.      Review of Systems   Constitutional: Negative for chills and fever.   Skin:        Left index finger swelling and pain and erythema       Past Medical History:   Diagnosis Date     Cellulitis and abscess of trunk 6/27/2017     Depression      Hyperlipidemia LDL goal <100 10/31/2010     Hypertension goal BP (blood pressure) < 140/90 3/17/2011     Hypothyroidism 1/12/2010     Morbid obesity due to excess calories (H) 1/12/2010     Neuropathy in diabetes (H) 1/12/2010     Obesity 1/12/2010     Sleep apnea 1/12/2010    CPAP     Type 2 diabetes, HbA1C goal < 8% (H) 3/8/2011     Family History   Problem Relation Age of Onset     Breast Cancer Mother      Hypertension Mother      Thyroid Disease Mother      Depression Mother      Alzheimer Disease Mother      Cancer - colorectal Father      Thyroid Disease Father      Depression Father      Heart Disease Maternal Grandmother         CHF     Circulatory Paternal Grandmother      Cancer Paternal Grandfather  "     Diabetes Brother      Diabetes Brother      Current Outpatient Medications   Medication Sig Dispense Refill     ammonium lactate (AMLACTIN) 12 % external cream Apply topically daily Apply to feet daily 385 g 2     ammonium lactate (LAC-HYDRIN) 12 % cream Apply topically 2 times daily as needed for dry skin 385 g 3     ASPIRIN 81 MG OR TABS ONE DAILY 100 3     atenolol (TENORMIN) 25 MG tablet Take 1 tablet (25 mg) by mouth daily 90 tablet 0     buPROPion (WELLBUTRIN XL) 300 MG 24 hr tablet Take 1 tablet (300 mg) by mouth every morning 90 tablet 1     Calcium Carb-Cholecalciferol (CALCIUM 600 + D PO) Take 1 tablet by mouth daily       citalopram (CELEXA) 20 MG tablet TAKE 1 TABLET (20 MG) BY MOUTH DAILY 90 tablet 1     Cyanocobalamin (VITAMIN B 12 PO) Take 250 mcg by mouth daily       ferrous sulfate 325 (65 FE) MG tablet Take 1 tablet by mouth daily (with breakfast).       finasteride (PROSCAR) 5 MG tablet Take 1 tablet (5 mg) by mouth daily 90 tablet 1     hydrocortisone (WESTCORT) 0.2 % cream Apply topically 2 times daily as needed Apply sparingly to affected area, BID. 45 g 1     insulin glargine (LANTUS SOLOSTAR) 100 UNIT/ML pen 30 units qd.  Increase as directed to max dose of 45 units qd. 45 mL 1     insulin pen needle (B-D U/F) 31G X 5 MM miscellaneous USE 1 DAILY OR AS DIRECTED. 100 each 3     levothyroxine (LEVOXYL) 125 MCG tablet Take 1 tablet (125 mcg) by mouth daily Dispense name brand Levoxyl, no generics 90 tablet 3     metFORMIN (GLUCOPHAGE) 1000 MG tablet TAKE 1 TABLET (1,000 MG) BY MOUTH 2 TIMES DAILY (WITH MEALS) 180 tablet 3     MULTI-VITAMIN OR TABS 1 TABLET DAILY 30 0     mupirocin (BACTROBAN) 2 % external ointment Apply topically 3 times daily 22 g 0     omeprazole-sodium bicarbonate (ZEGERID)  MG per capsule Take 1 capsule by mouth every morning (before breakfast)       simvastatin (ZOCOR) 20 MG tablet Take 1 tablet (20 mg) by mouth At Bedtime 90 tablet 3      sulfamethoxazole-trimethoprim (BACTRIM DS/SEPTRA DS) 800-160 MG tablet Take 1 tablet by mouth 2 times daily for 10 days 20 tablet 0     VITAMIN D, CHOLECALCIFEROL, PO Take 1,000 Units by mouth daily.       azithromycin (ZITHROMAX) 500 MG tablet Take one tablet daily for up to 3 days as needed for traveler's diarrhea (Patient not taking: Reported on 11/15/2019) 3 tablet 0     blood glucose (ONE TOUCH VERIO IQ) test strip Use to test blood sugars 2 times daily or as directed. 100 strip 0     blood glucose monitoring (KERLINE CONTOUR NEXT) test strip Use to test blood sugar 3 times daily or as directed. 100 strip 11     blood glucose monitoring (KERLINE MICROLET) lancets Use to test blood sugar 3 times daily or as directed. 100 each 11     Continuous Blood Gluc  (FREESTYLE GIULIANA 14 DAY READER) SIENA 1 each continuous 1 Device 0     Continuous Blood Gluc Sensor (FREESTYLE GIULIANA 14 DAY SENSOR) MISC 1 each every 14 days 2 each 11     cyclobenzaprine (FLEXERIL) 10 MG tablet Take 1 tablet (10 mg) by mouth 3 times daily as needed for muscle spasms (Patient not taking: Reported on 11/15/2019) 30 tablet 1     dulaglutide (TRULICITY) 0.75 MG/0.5ML pen Inject 0.75 mg Subcutaneous every 7 days (Patient not taking: Reported on 11/15/2019) 2 mL 1     losartan (COZAAR) 50 MG tablet Take 1 tablet (50 mg) by mouth daily 90 tablet 3     order for DME Equipment being ordered: Please dispense up to 3 pairs of knee high compression stockings at 20-30 mmHg and one donning device if needed. 4 Device 0     order for DME Equipment being ordered: Lt wrist splint 1 Device 0     ORDER FOR DME Equipment being ordered: four pronged cane 1 Units 0     ORDER FOR DME Equipment being ordered: single cane with rubber foot 1 Units 0     UNABLE TO FIND MEDICATION NAME: Prednisolone Acetate, Gatifloxacin and Bromfenac 1/0.5/0.075% - instill one drop in the surgery eye three times daily beginning 2 days before surgery       Social History     Tobacco  Use     Smoking status: Never Smoker     Smokeless tobacco: Never Used   Substance Use Topics     Alcohol use: Yes     Comment: occ       OBJECTIVE  /74 (BP Location: Right arm, Patient Position: Sitting, Cuff Size: Adult Large)   Pulse 80   Temp 98.2  F (36.8  C) (Oral)   Wt 111.7 kg (246 lb 4.8 oz)   SpO2 98%   BMI 39.58 kg/m      Physical Exam  Vitals signs and nursing note reviewed.   Constitutional:       General: He is not in acute distress.     Appearance: He is well-developed.   HENT:      Head: Normocephalic and atraumatic.      Right Ear: External ear normal.      Left Ear: External ear normal.   Eyes:      Conjunctiva/sclera: Conjunctivae normal.   Neck:      Musculoskeletal: Normal range of motion.   Cardiovascular:      Rate and Rhythm: Regular rhythm.      Heart sounds: Normal heart sounds.   Pulmonary:      Effort: Pulmonary effort is normal. No respiratory distress.      Breath sounds: Normal breath sounds.   Skin:     General: Skin is warm and dry.      Comments: Index finger exam: There is moderate erythema and swelling noted at the distal end of the finger.  There is mild to moderate tenderness to palpation.  There is some fluctuance noted.  He also has a healing scab noted on the dorsum of the finger at the PIP area. ROM is intact. Skin feels warm to the touch. No lymphangitis noted. Remainder of left hand exam is normal.    Neurological:      Mental Status: He is alert.       Procedure:   Anesthesia: none  Left finger cleaned with alcohol pad and betadine. With an 18 gauge needle a stab incision is made at the point of most fluctuance. Mild purulent drainage noted along with some bloody drainage. Patient tolerated the procedure very well. Bacitracin is put on and then a bandaid put on.   Wound culture is pending.    Labs:  No results found for this or any previous visit (from the past 24 hour(s)).    X-Ray was not done.    ASSESSMENT:      ICD-10-CM    1. Cellulitis of left index  finger L03.012 cefTRIAXone (ROCEPHIN) injection 1 g     sulfamethoxazole-trimethoprim (BACTRIM DS/SEPTRA DS) 800-160 MG tablet     Wound Culture Aerobic Bacterial     CANCELED: Wound Culture Aerobic Bacterial GICH (FUTURE)   2. Abscess of left index finger L02.512 DRAIN SKIN ABSCESS SIMPLE/SINGLE          PLAN:    Cellulitis left index finger: He is given 1 g of Rocephin IM in urgent care today.  Bactrim is prescribed.  May take Tylenol or Motrin as needed for pain.  Patient is traveling to [place] tomorrow, advised to be rechecked in a week when he is back.  Patient and his daughter agree with the plan.  Abscess of left index finger: Incised and drained as above.  Wound culture is pending.  Wound care instructions are given.  Keep affected area clean, advised to put bacitracin on. Change dressing daily.  He is leaving for [place] tomorrow.  Avoid soaking for extended periods of time in water.  Return for recheck in a week.  Follow-up sooner if any worsening symptoms.  Patient and his daughter agree with the plan.    Followup:    If not improving or if condition worsens, follow up with your Primary Care Provider    Patient Instructions   Keep affected area clean. Put bacitracin ointment on. Change bandage daily. Let it open to air at night.      Patient Education     Cellulitis  Cellulitis is an infection of the deep layers of skin. A break in the skin, such as a cut or scratch, can let bacteria under the skin. If the bacteria get to deep layers of the skin, it can be serious. If not treated, cellulitis can get into the bloodstream and lymph nodes. The infection can then spread throughout the body. This causes serious illness.  Cellulitis causes the affected skin to become red, swollen, warm, and sore. The reddened areas have a visible border. An open sore may leak fluid (pus). You may have a fever, chills, and pain.  Cellulitis is treated with antibiotics taken for 7 to 10 days. An open sore may be cleaned and  covered with cool wet gauze. Symptoms should get better 1 to 2 days after treatment is started. Make sure to take all the antibiotics for the full number of days until they are gone. Keep taking the medicine even if your symptoms go away.  Home care  Follow these tips:    Limit the use of the part of your body with cellulitis.     If the infection is on your leg, keep your leg raised while sitting. This will help to reduce swelling.    Take all of the antibiotic medicine exactly as directed until it is gone. Do not miss any doses, especially during the first 7 days. Don t stop taking the medicine when your symptoms get better.    Keep the affected area clean and dry.    Wash your hands with soap and warm water before and after touching your skin. Anyone else who touches your skin should also wash his or her hands. Don't share towels.  Follow-up care  Follow up with your healthcare provider, or as advised. If your infection does not go away on the first antibiotic, your healthcare provider will prescribe a different one.  When to seek medical advice  Call your healthcare provider right away if any of these occur:    Red areas that spread    Swelling or pain that gets worse    Fluid leaking from the skin (pus)    Fever higher of 100.4  F (38.0  C) or higher after 2 days on antibiotics  Date Last Reviewed: 9/1/2016 2000-2018 [employer]. [address] [place] [address], [place] [address]. All rights reserved. This information is not intended as a substitute for professional medical care. Always follow your healthcare professional's instructions.

## 2019-11-17 ASSESSMENT — ENCOUNTER SYMPTOMS
FEVER: 0
CHILLS: 0

## 2019-11-19 LAB
BACTERIA SPEC CULT: ABNORMAL
Lab: ABNORMAL
SPECIMEN SOURCE: ABNORMAL

## 2019-12-04 ENCOUNTER — THERAPY VISIT (OUTPATIENT)
Dept: OCCUPATIONAL THERAPY | Facility: CLINIC | Age: 59
End: 2019-12-04
Attending: PHYSICIAN ASSISTANT
Payer: MEDICARE

## 2019-12-04 DIAGNOSIS — R29.898 DECREASED GRIP STRENGTH: ICD-10-CM

## 2019-12-04 PROCEDURE — 97110 THERAPEUTIC EXERCISES: CPT | Mod: GO | Performed by: OCCUPATIONAL THERAPIST

## 2019-12-04 PROCEDURE — 97165 OT EVAL LOW COMPLEX 30 MIN: CPT | Mod: GO | Performed by: OCCUPATIONAL THERAPIST

## 2019-12-04 PROCEDURE — 97535 SELF CARE MNGMENT TRAINING: CPT | Mod: GO | Performed by: OCCUPATIONAL THERAPIST

## 2019-12-04 NOTE — LETTER
DEPARTMENT OF HEALTH AND HUMAN SERVICES  CENTERS FOR MEDICARE & MEDICAID SERVICES    PLAN/UPDATED PLAN OF PROGRESS FOR OUTPATIENT REHABILITATION    PATIENTS NAME:  Steve Morgan   : 1960  PROVIDER NUMBER:  [id number]  [id number]  PROVIDER NAME: [facility]  MEDICAL RECORD NUMBER: [id number]   START OF CARE DATE:    SOC Date: 19   TYPE:  OT    PRIMARY/TREATMENT DIAGNOSIS: (Pertinent Medical Diagnosis)  Decreased  strength    VISITS FROM START OF CARE:  Rxs Used: 1       Hand Therapy Initial Evaluation  Current Date:  2019    Subjective:  Steve Morgan is a 59 year old Left hand dominant male.  Diagnosis: B decreased hand   DOI:  Gradual onset (MD order date 10/24/19)  *pt has heredity tremor    Patient reports symptoms of pain, stiffness/loss of motion and weakness/loss of strength of the B hands which occurred due to neuropathy. Since onset symptoms are unchanged. Special tests:  none recently.  Previous treatment: none. General health as reported by patient is good.  Pertinent medical history includes: Depression, Diabetes, High Blood Pressure, Numbness/Tingling, Overweight, Sleep Disorder/Apnea, Thyroid Problems, Changes in Bowel/Bladder, Foot Drop, Pain at Night/Rest.  Medical allergies: none.  Surgical history: none.  Medication history: Anti-depressants, High Blood Pressure, Thyroid.    Occupational Profile Information:  Current occupation is retired   Prior functional level:  independent-shared household chores  Barriers include: has help with buttons  Mobility: Ambulates with aid of cane for balance  Transportation: drives  Leisure activities/hobbies: playing cards, watching TV, coins, geneology    Upper Extremity Functional Index Score:  SCORE:   Column Totals: /80: 47   (A lower score indicates greater disability.)    Pain: none present    Sensation: no numbness or tingling          PATIENTS NAME:  Steve Morgan   : 1960    ROM  Hand 2019  2019   AROM(PROM) L R   Index MP +/WNL 0/WNL   PIP -45/WNL -47/WNL   DIP 0/WNL 0/WNL   SCHULTZ     Long MP +/WNL 0/WNL   PIP -35/WNL -45/WNL   DIP 0/WNL 0/WNL   SCHULTZ     Ring MP +/WNL 0/WNL   PIP -30/WNL -34/WNL   DIP 0/WNL 0/WNL   SCHULTZ     Small MP +/WNL 0/WNL   PIP -15/WNL -55/WNL   DIP 0/WNL 0/WNL   SCHULTZ         ROM  Thumb 2019   AROM  (PROM) L R   MP 0/WNL 0/WNL   IP -47/WNL -24/WNL   RABD 47 45   PABD Unable to do 43   Retropulsion     Kapandji Opposition Scale (0-10/10) 6 6       Strength   (Measured in pounds)  Pain Report:  scale of 0-10/10   2019   Trials L R   1  2  3 25 25   Average 25 25     Lat Pinch 2019   Trials L R   1  2  3 6 6   Average 6 6           PATIENTS NAME:  Steve Morgan   : 1960    3 Pt Pinch 2019   Trials L R   1  2  3 9 10   Average 9 10     Functional Dexterity Testing   2019   Bria R:2.02 sec with sliding off table  L:1 min 57 sec with sliding off table   9 hold peg test R: 2 in 2 min  L: 3 in 2 min     Assessment/Plan:  Patient presents with symptoms consistent with diagnosis of B hand weakness, with conservative intervention.     Patient's limitations or Problem List includes:  Decreased ROM/motion, Weakness, Decreased , Decreased pinch and Decreased coordination of the bilateral hand which interferes with the patient's ability to perform Self Care Tasks (dressing, eating), Recreational Activities and Household Chores as compared to previous level of function.    Rehab Potential:  Good - Return to full activity, some limitations    Patient will benefit from skilled Occupational Therapy to increase flexibility,  strength, pinch strength, stability of thumb, coordination and dexterity to return to previous activity level and resume normal daily tasks and to reach their rehab potential.    Barriers to Learning:  No barrier    Communication Issues:  Patient appears to be able to clearly communicate and  understand verbal and written communication and follow directions correctly.    Assessment of Occupational Performance:  5 or more Performance Deficits  Identified Performance Deficits: bathing/showering, toileting, dressing, feeding, health management and maintenance, home establishment and management, meal preparation and cleanup and leisure activities      Clinical Decision Making (Complexity): Low complexity    Treatment Explanation:  The following has been discussed with the patient:  RX ordered/plan of care  Anticipated outcomes  Possible risks and side effects    P: Frequency:  1 X week, once daily  Duration:  for 2 weeks        PATIENTS NAME:  Steve Morgan   : 1960    Treatment Plan:  Therapeutic Exercise:  AROM, PROM, Tendon Gliding, Extensor Tracking, Isotonics, Isometrics and Stabilization  Neuromuscular re-education:  Nerve Gliding, Coordination/Dexterity, Kinesthetic Training, Proprioceptive Training and Stabilization  Manual Techniques:  Coordination/Dexterity, Joint mobilization and Myofascial release  Orthotic Fabrication:  Static orthosis, Finger based orthosis and Hand based orthosis  Self Care:  Self Care Tasks and Ergonomic Considerations  Discharge Plan:  Achieve all LTG.  Independent in home treatment program.  Reach maximal therapeutic benefit.    Home Exercise Program:   ball  Built up handles, large pen for writing, button hook    Next Visit:  Pt to bring in list of difficult tasks to review  Oval 8's vs custom orthoses      Caregiver Signature/Credentials ______________________________ Date ________       Treating Provider: JAI Story, CHT    I have reviewed and certified the need for these services and plan of treatment while under my care.        PHYSICIAN'S SIGNATURE:   _____________________________________  Date___________      SLIM Jean    Certification period: Beginning of Cert date period: 19 End of Cert period date: 01/15/20     Functional  "Level Progress Report: Please see attached \"Goal Flow sheet for Functional level.\"    ___X_____ Continue Services or       ________ DC Services                Service dates: SOC Date: 12/04/19  to present                                                                     "

## 2019-12-04 NOTE — PROGRESS NOTES
Hand Therapy Initial Evaluation  Current Date:  12/4/2019    Subjective:  Steve Morgan is a 59 year old Left hand dominant male.    Diagnosis: B decreased hand   DOI:  Gradual onset (MD order date 10/24/19)  *pt has heredity tremor    Patient reports symptoms of pain, stiffness/loss of motion and weakness/loss of strength of the B hands which occurred due to neuropathy. Since onset symptoms are unchanged. Special tests:  none recently.  Previous treatment: none. General health as reported by patient is good.  Pertinent medical history includes: Depression, Diabetes, High Blood Pressure, Numbness/Tingling, Overweight, Sleep Disorder/Apnea, Thyroid Problems, Changes in Bowel/Bladder, Foot Drop, Pain at Night/Rest.  Medical allergies: none.  Surgical history: none.  Medication history: Anti-depressants, High Blood Pressure, Thyroid.    Occupational Profile Information:  Current occupation is retired   Prior functional level:  independent-shared household chores  Barriers include: has help with buttons  Mobility: Ambulates with aid of cane for balance  Transportation: drives  Leisure activities/hobbies: playing cards, watching TV, coins, Novacem    Upper Extremity Functional Index Score:  SCORE:   Column Totals: /80: 47   (A lower score indicates greater disability.)    Pain: none present    Sensation: no numbness or tingling    ROM  Hand 12/4/2019 12/4/2019   AROM(PROM) L R   Index MP +/WNL 0/WNL   PIP -45/WNL -47/WNL   DIP 0/WNL 0/WNL   SCHULTZ     Long MP +/WNL 0/WNL   PIP -35/WNL -45/WNL   DIP 0/WNL 0/WNL   SCHULTZ     Ring MP +/WNL 0/WNL   PIP -30/WNL -34/WNL   DIP 0/WNL 0/WNL   SCHULTZ     Small MP +/WNL 0/WNL   PIP -15/WNL -55/WNL   DIP 0/WNL 0/WNL   SCHULTZ       ROM  Thumb 12/4/2019 12/4/2019   AROM  (PROM) L R   MP 0/WNL 0/WNL   IP -47/WNL -24/WNL   RABD 47 45   PABD Unable to do 43   Retropulsion     Kapandji Opposition Scale (0-10/10) 6 6     Strength   (Measured in pounds)  Pain Report:  scale of  0-10/10   12/4/2019 12/4/2019   Trials L R   1  2  3 25 25   Average 25 25     Lat Pinch 12/4/2019 12/4/2019   Trials L R   1  2  3 6 6   Average 6 6     3 Pt Pinch 12/4/2019 12/4/2019   Trials L R   1  2  3 9 10   Average 9 10     Functional Dexterity Testing   12/4/2019   Bria R:2.02 sec with sliding off table  L:1 min 57 sec with sliding off table   9 hold peg test R: 2 in 2 min  L: 3 in 2 min     Assessment/Plan:  Patient presents with symptoms consistent with diagnosis of B hand weakness, with conservative intervention.     Patient's limitations or Problem List includes:  Decreased ROM/motion, Weakness, Decreased , Decreased pinch and Decreased coordination of the bilateral hand which interferes with the patient's ability to perform Self Care Tasks (dressing, eating), Recreational Activities and Household Chores as compared to previous level of function.    Rehab Potential:  Good - Return to full activity, some limitations    Patient will benefit from skilled Occupational Therapy to increase flexibility,  strength, pinch strength, stability of thumb, coordination and dexterity to return to previous activity level and resume normal daily tasks and to reach their rehab potential.    Barriers to Learning:  No barrier    Communication Issues:  Patient appears to be able to clearly communicate and understand verbal and written communication and follow directions correctly.    Assessment of Occupational Performance:  5 or more Performance Deficits  Identified Performance Deficits: bathing/showering, toileting, dressing, feeding, health management and maintenance, home establishment and management, meal preparation and cleanup and leisure activities      Clinical Decision Making (Complexity): Low complexity    Treatment Explanation:  The following has been discussed with the patient:  RX ordered/plan of care  Anticipated outcomes  Possible risks and side effects    P: Frequency:  1 X week, once  daily  Duration:  for 2 weeks    Treatment Plan:  Therapeutic Exercise:  AROM, PROM, Tendon Gliding, Extensor Tracking, Isotonics, Isometrics and Stabilization  Neuromuscular re-education:  Nerve Gliding, Coordination/Dexterity, Kinesthetic Training, Proprioceptive Training and Stabilization  Manual Techniques:  Coordination/Dexterity, Joint mobilization and Myofascial release  Orthotic Fabrication:  Static orthosis, Finger based orthosis and Hand based orthosis  Self Care:  Self Care Tasks and Ergonomic Considerations  Discharge Plan:  Achieve all LTG.  Independent in home treatment program.  Reach maximal therapeutic benefit.    Home Exercise Program:   ball  Built up handles, large pen for writing, button hook    Next Visit:  Pt to bring in list of difficult tasks to review  Oval 8's vs custom orthoses

## 2019-12-05 ENCOUNTER — OFFICE VISIT (OUTPATIENT)
Dept: ENDOCRINOLOGY | Facility: CLINIC | Age: 59
End: 2019-12-05
Payer: MEDICARE

## 2019-12-05 ENCOUNTER — ALLIED HEALTH/NURSE VISIT (OUTPATIENT)
Dept: EDUCATION SERVICES | Facility: CLINIC | Age: 59
End: 2019-12-05
Payer: MEDICARE

## 2019-12-05 VITALS
DIASTOLIC BLOOD PRESSURE: 80 MMHG | HEART RATE: 68 BPM | BODY MASS INDEX: 38.88 KG/M2 | SYSTOLIC BLOOD PRESSURE: 124 MMHG | WEIGHT: 241.9 LBS | OXYGEN SATURATION: 97 %

## 2019-12-05 DIAGNOSIS — E11.40 TYPE 2 DIABETES MELLITUS WITH DIABETIC NEUROPATHY, WITH LONG-TERM CURRENT USE OF INSULIN (H): Primary | ICD-10-CM

## 2019-12-05 DIAGNOSIS — I10 BENIGN ESSENTIAL HYPERTENSION: ICD-10-CM

## 2019-12-05 DIAGNOSIS — Z79.4 TYPE 2 DIABETES MELLITUS WITH DIABETIC NEUROPATHY, WITH LONG-TERM CURRENT USE OF INSULIN (H): Primary | ICD-10-CM

## 2019-12-05 DIAGNOSIS — E03.4 HYPOTHYROIDISM DUE TO ACQUIRED ATROPHY OF THYROID: ICD-10-CM

## 2019-12-05 DIAGNOSIS — E78.5 HYPERLIPIDEMIA LDL GOAL <100: ICD-10-CM

## 2019-12-05 LAB — HBA1C MFR BLD: 11.7 % (ref 0–5.6)

## 2019-12-05 PROCEDURE — 83036 HEMOGLOBIN GLYCOSYLATED A1C: CPT | Performed by: CLINICAL NURSE SPECIALIST

## 2019-12-05 PROCEDURE — 99214 OFFICE O/P EST MOD 30 MIN: CPT | Performed by: CLINICAL NURSE SPECIALIST

## 2019-12-05 PROCEDURE — 84443 ASSAY THYROID STIM HORMONE: CPT | Performed by: CLINICAL NURSE SPECIALIST

## 2019-12-05 PROCEDURE — 36415 COLL VENOUS BLD VENIPUNCTURE: CPT | Performed by: CLINICAL NURSE SPECIALIST

## 2019-12-05 PROCEDURE — 84439 ASSAY OF FREE THYROXINE: CPT | Performed by: CLINICAL NURSE SPECIALIST

## 2019-12-05 RX ORDER — METFORMIN HCL 500 MG
1000 TABLET, EXTENDED RELEASE 24 HR ORAL
Qty: 180 TABLET | Refills: 1 | Status: SHIPPED | OUTPATIENT
Start: 2019-12-05 | End: 2019-12-05

## 2019-12-05 NOTE — LETTER
12/5/2019         RE: Steve Morgan  [id number]  [facility] [id number]        Dear Colleague,    Thank you for referring your patient, Steve Morgan, to the [facility]. Please see a copy of my visit note below.    Diabetes Self-Management Education & Support    Diabetes Self-Management Education & Support - Personal CGM Start    SUBJECTIVE/OBJECTIVE     Cultural Influences/Ethnic Background:  American    Pt is being seen today by Diana Butler NP who has requested assistance with resuming personal CGM.     Taking Medications  Diabetes Medication(s)     Insulin       insulin glargine (LANTUS SOLOSTAR) 100 UNIT/ML pen    30 units qd.  Increase as directed to max dose of 45 units qd.        LibrePro personal sensor started today. (Lot # [id number], Serial #[id number], Expiration date 2020-05-31). Sensor was inserted with no resistance or bleeding at insertion site.  Pt will plan to wear the sensor for 14 days.      Patient verbalizes understanding of how to remove sensor and all instructions provided.     ASSESSMENT  Pt has had difficulty placing sensors in the past due to tremors.  Will assist patient at next visit as needed.     CGM-specific education:   Use of trends and graphs for pattern management and problem solving      PLAN  CDE follow up scheduled 1/3/20.    Lucía Chávez RD, CDE  Diabetes     Time Spent: 10 minutes  Encounter Type: Individual  No charge visit    Any diabetes medication dose changes were made via the CDE Protocol and Collaborative Practice Agreement with the patient's endocrinology provider. A copy of this encounter was shared with the provider.

## 2019-12-05 NOTE — PROGRESS NOTES
Diabetes Self-Management Education & Support    Diabetes Self-Management Education & Support - Personal CGM Start    SUBJECTIVE/OBJECTIVE     Cultural Influences/Ethnic Background:  American    Pt is being seen today by Diana Butler NP who has requested assistance with resuming personal CGM.     Taking Medications  Diabetes Medication(s)     Insulin       insulin glargine (LANTUS SOLOSTAR) 100 UNIT/ML pen    30 units qd.  Increase as directed to max dose of 45 units qd.        LibrePro personal sensor started today. (Lot # [id number], Serial #[id number], Expiration date 2020-05-31). Sensor was inserted with no resistance or bleeding at insertion site.  Pt will plan to wear the sensor for 14 days.      Patient verbalizes understanding of how to remove sensor and all instructions provided.     ASSESSMENT  Pt has had difficulty placing sensors in the past due to tremors.  Will assist patient at next visit as needed.     CGM-specific education:   Use of trends and graphs for pattern management and problem solving      PLAN  CDE follow up scheduled 1/3/20.    Lucía Chávez RD, CDE  Diabetes     Time Spent: 10 minutes  Encounter Type: Individual  No charge visit    Any diabetes medication dose changes were made via the CDE Protocol and Collaborative Practice Agreement with the patient's endocrinology provider. A copy of this encounter was shared with the provider.

## 2019-12-05 NOTE — LETTER
"    12/5/2019         RE: Steve Morgan  [id number]  [facility] [address]        Dear Colleague,    Thank you for referring your patient, Steve Morgan, to the [facility]. Please see a copy of my visit note below.    Name: Steve \"Caio\" Cathy  F/u for Diabetes (Last seen 8/29/2019).  HPI:  Steve Morgan is a 59 year old male who presents for the management of:    1. Type 2 DM:  Orginally diagnosed at the age of 33 or 38.  Was prediabetic prior, was not particularly symptomatic at the time, was noted on routine lab work    Current Regimen: Metformin 1000 mg qd, Lantus 31 units qd (started Lantus 5/2017).   The plan was to start Trulicity at his last visit but the copay was not affordable ($100 per refill).    Previously on metformin 2000 mg/day and glipizide 20 mg q am.  At first, he 'confessed' that he has not been taking his medications for about 18 months but then said \"no, I've been taking the Lantus and one metformin everyday\".    Has been unable to tolerate a higher dose of Metformin due to diarrhea.  Still has some diarrhea at the 1000 mg/day dose.    BS checks: None - not wearing Nina since April 2019.  Difficult for him to insert Nina due to his tremor.  Meter Download: Did not bring meter - not using his meter - he has a hard time doing fingerstick testing due to his tremor.    Last diabetes ed appointment:  9/2018.        Complications:   Diabetes Complications  Description / Detail    Diabetic Retinopathy  No retinopathy, early cataract, last exam - unsure.  Cataract surgery 9/2018   CAD / PAD  No   Neuropathy  Yes + diabetic neuropathy: numbness hands and feet.  Saw podiatry, Dr. Mathis, 1/27/2018- using diabetic shoes   Nephropathy / Microalbuminuria  No   Gastroparesis  No   Hypoglycemia [place]  Not sure, gets 'kind of dizzy' when blood sugar is low but reports history of low blood sugars without symptoms     2. Hypertension: Blood Pressure today:   BP Readings from " Last 3 Encounters:   12/05/19 124/80   11/15/19 138/74   11/04/19 138/78   .  Blood pressure medications include atenolol 25 mg qd and losartan 50 mg every day.   3. Hyperlipidemia: Takes simvastatin 20 mg for lipid control.       4. Prevention:  Flu Shot- 10/2019  Pneumovax- 2012  Opthalmology-Yes: annually  Dental-Yes: twice a year  ASA-Yes, 81 mg qd   Smoking- No  5.  Hypothyroidism. Currently treated with levoxyl (KAMERON) 125 mcg daily. Takes the levoxyl first thing in the morning and waits 30+ minutes before eating breakfast.  PA for Levoxyl approved through 2/18/2020.  PMH/PSH:  Past Medical History:   Diagnosis Date     Cellulitis and abscess of trunk 6/27/2017     Depression      Hyperlipidemia LDL goal <100 10/31/2010     Hypertension goal BP (blood pressure) < 140/90 3/17/2011     Hypothyroidism 1/12/2010     Morbid obesity due to excess calories (H) 1/12/2010     Neuropathy in diabetes (H) 1/12/2010     Obesity 1/12/2010     Sleep apnea 1/12/2010    CPAP     Type 2 diabetes, HbA1C goal < 8% (H) 3/8/2011     Past Surgical History:   Procedure Laterality Date     COLONOSCOPY       GENITOURINARY SURGERY      surg for undescended testicle     REPAIR HAMMER TOE BILATERAL  5/16/2013    Procedure: REPAIR HAMMER TOE BILATERAL;  Flexor Tenotomy Toes 2,3,4,5 Bilateral Feet;  Surgeon: Saad Bangura DPM;  Location: [facility] OR     Family Hx:  Family History   Problem Relation Age of Onset     Breast Cancer Mother      Hypertension Mother      Thyroid Disease Mother      Depression Mother      Alzheimer Disease Mother      Cancer - colorectal Father      Thyroid Disease Father      Depression Father      Heart Disease Maternal Grandmother         CHF     Circulatory Paternal Grandmother      Cancer Paternal Grandfather      Diabetes Brother      Diabetes Brother      Thyroid disease: Yes: mother         DM2: Yes: one brother with type 1 diabetes and one brother with type 2 diabetes, paternal aunt with  DM2         Autoimmune: DM1, SLE, RA, Vitiligo Yes: brother with DM1    Social Hx:  Social History     Socioeconomic History     Marital status:      Spouse name: Sri     Number of children: 2     Years of education: Not on file     Highest education level: Not on file   Occupational History     Occupation:      Employer: NONE      Comment: [employer]   Social Needs     Financial resource strain: Not on file     Food insecurity:     Worry: Not on file     Inability: Not on file     Transportation needs:     Medical: Not on file     Non-medical: Not on file   Tobacco Use     Smoking status: Never Smoker     Smokeless tobacco: Never Used   Substance and Sexual Activity     Alcohol use: Yes     Comment: occ     Drug use: No     Sexual activity: Not Currently     Partners: Female   Lifestyle     Physical activity:     Days per week: Not on file     Minutes per session: Not on file     Stress: Not on file   Relationships     Social connections:     Talks on phone: Not on file     Gets together: Not on file     Attends [employer]ology service: Not on file     Active member of club or organization: Not on file     Attends meetings of clubs or organizations: Not on file     Relationship status: Not on file     Intimate partner violence:     Fear of current or ex partner: Not on file     Emotionally abused: Not on file     Physically abused: Not on file     Forced sexual activity: Not on file   Other Topics Concern     Parent/sibling w/ CABG, MI or angioplasty before 65F 55M? No   Social History Narrative     Not on file          MEDICATIONS:  has a current medication list which includes the following prescription(s): ammonium lactate, aspirin, atenolol, blood glucose monitoring, bupropion, calcium carb-cholecalciferol, citalopram, freestyle juan 14 day reader, freestyle juan 14 day sensor, cyanocobalamin, ferrous sulfate, finasteride, hydrocortisone, insulin glargine, insulin pen needle, levothyroxine,  losartan, metformin, multi-vitamin, mupirocin, omeprazole-sodium bicarbonate, order for dme, order for dme, simvastatin, sulfamethoxazole-trimethoprim, cholecalciferol, ammonium lactate, azithromycin, blood glucose, blood glucose, cyclobenzaprine, dulaglutide, order for dme, and order for dme.    ROS     ROS: 10 point ROS neg other than the symptoms noted above in the HPI.    Physical Exam   VS: /80   Pulse 68   Wt 109.7 kg (241 lb 14.4 oz)   SpO2 97%   BMI 38.88 kg/m     GENERAL: AXOX3, NAD, well dressed, answering questions appropriately, appears stated age.  HEENT: no exopthalmous, no proptosis, no lig lag, no retraction  CV: RRR, no rubs, gallops, no murmurs  LUNGS: CTAB, no wheezes, rales, or ronchi  EXTREMITIES: trace edema at the ankles  NEUROLOGY: CN grossly intact, no tremors  MSK: grossly intact  SKIN: no rashes, no lesions    LABS:  A1c:   Component      Latest Ref Rng & Units 12/14/2018 8/29/2019 12/5/2019   Hemoglobin A1C      0 - 5.6 % 11.8 (H) 11.6 (H) 11.7 (H)     Basic Metabolic Panel:  !COMPREHENSIVE Latest Ref Rng & Units 10/24/2019   SODIUM 133 - 144 mmol/L 129 (L)   POTASSIUM 3.4 - 5.3 mmol/L 5.0   CHLORIDE 94 - 109 mmol/L 98   BUN 7 - 30 mg/dL 17   Creatinine 0.66 - 1.25 mg/dL 1.09   Glucose 70 - 99 mg/dL 285 (H)   ANION GAP 3 - 14 mmol/L 5   CALCIUM 8.5 - 10.1 mg/dL 8.8     Component      Latest Ref Rng & Units 8/29/2019   Glucose 316   C-Peptide      0.9 - 6.9 ng/mL 2.5     LFTS/Cholesterol Panel:  !LIPID/HEPATIC Latest Ref Rng & Units 8/29/2019   CHOLESTEROL <200 mg/dL 192   TRIGLYCERIDES <150 mg/dL 186 (H)   HDL CHOLESTEROL >39 mg/dL 34 (L)   LDL CHOLESTEROL, CALCULATED <100 mg/dL 121 (H)   VLDL-CHOLESTEROL 0 - 30 mg/dL    NON HDL CHOLESTEROL <130 mg/dL 158 (H)   CHOLESTEROL/HDL RATIO 0.0 - 5.0    AST 0 - 45 U/L 18   ALT 0 - 70 U/L 28     Thyroid Function:   !THYROID Latest Ref Rng & Units 12/14/2018   TSH 0.40 - 4.00 mU/L 2.19     Component    Latest Ref Rng & Units 10/19/2018  "  Thyroid Peroxidase Antibody    <35 IU/mL 11   Thyroglobulin Antibody    <40 IU/mL <20   Thyroid Stim Immunog    <=1.3 TSI index <1.0     Urine MicroAlbumin:  Component      Latest Ref Rng & Units 12/14/2018   Creatinine Urine      mg/dL 32   Albumin Urine mg/L      mg/L 20   Albumin Urine mg/g Cr      0 - 17 mg/g Cr 63.47 (H)     Vital Signs 4/2/2019 4/17/2019 8/29/2019   Weight (LB) 223 lb 236 lb 243 lb 3.2 oz   Height 5' 5\"  5' 6\"   BMI (Calculated) 37.11  39.25       All pertinent notes, labs, and images personally reviewed by me.     A/P  Mr.Walter Morgan is a 59 year old here for the management of diabetes:    1. DM2 - Uncontrolled.  A1c extremely elevated at 11.7%.  A1c has been in this range for the past year.  Dietary indiscretions contributing factor but also noted to have low insulin production based on a cpeptide of 2.5 with a glucose >300.  Diabetes is complicated by neuropathy  No blood sugar data for review today.  He has challenges with blood sugar testing due to limited use of his hands (neuropathy + tremor - both significant) and increasing difficulty with ADL's   Can't test his blood sugars using a meter due to the above.   He also has an extremely hard time inserting the Nina sensors due to neuropathy and tremor.    Change Metformin to extended release - continue 1000 mg daily.  If XR better tolerated, we can try increasing the Metformin dose.  Due to insulin deficiency, he likely needs treatment with MDI insulin.  Nina sensor placed today.  He'll return in 2 weeks for Nina download and review.  Consider increasing Metformin at that time if tolerating XR better.  Consider the addition of meal-time insulin depending on Nina results although unclear if insulin injections are also a challenge due to neuropathy and tremor - possibly consider insulin pump therapy which might be more manageable for him.  Schedule follow up with diabetes ed at the next available opening, which is in one " month.    2. Hypothyroidism.  Currently treated with Levoxyl (KAMERON) 125 mcg/day.  Clinically and biochemically euthyroid. Continue Levoxyl 125 mcg/day.  Obtain TFT's today.    3.  Hypertension - Variable.      4. Hyperlipidemia - On statin therapy.    Labs ordered today:   Orders Placed This Encounter   Procedures     Hemoglobin A1c     TSH with free T4 reflex     Albumin Random Urine Quantitative with Creat Ratio     AMBULATORY ADULT DIABETES EDUCATOR REFERRAL       Radiology/Consults ordered today: AMBULATORY ADULT DIABETES EDUCATOR REFERRAL    More than 50% of the time spent with Mr. Morgan on counseling / coordinating his care discussing the above plan of care.The patient indicates understanding of the above issues and agrees with the plan set forth.  Total face to face time was 30 minutes.       Follow-up:  3 months with me    Diana Butler NP  Endocrinology  [facility]  CC: Lisa Pierre              Again, thank you for allowing me to participate in the care of your patient.        Sincerely,        BRIDGET Payne CNP

## 2019-12-05 NOTE — PATIENT INSTRUCTIONS
Component      Latest Ref Rng & Units 2018   Hemoglobin A1C      0 - 5.6 % 9.0 (H) 11.8 (H) 11.6 (H) 11.7 (H)     Your A1c has been way too high for the past year.    This puts you at high risk for diabetic complications.  Your A1c goal is less than 7.0%.    Please wear your Nina.  I'll have you follow up with Lucía Chávez, diabetes ed, in 1 month. (1/3/20 at 10:30 a.m. in [place])   Make sure you have been wearing your Nina prior to the appointment with Lucía.  We'll look at your glucose patterns and determine what the best treatment option is.    BRING YOUR NINA READER INTO [employer] IN TWO WEEKS SO SHE CAN DOWNLOAD IT AND GIVE THE REPORT TO DIANA PELLETIER FOR REVIEW.    #2.  Let's keep the dose of metformin the same but change to extended release which has less GI side effects.    I will send a new prescription to change your metformin to extended release but you will need to take two tablets of the extended release to equal your current dose of 1000 mg (the pills you currently have at home are 1000 mg tablets but the extended release tabs only comes in 500 mg tablets so you need two extended release tabs to equal your current dose)          Your prior authorization for Levoxyl will  2020 and will need renewing.    Make sure the Levoxyl refill request submitted after 2020 allows enough time to resubmit the prior authorization papers which can take up to 7-10 days to process.

## 2019-12-05 NOTE — PROGRESS NOTES
"Name: Steve \"Caio\" Cathy  F/u for Diabetes (Last seen 8/29/2019).  HPI:  Steve Morgan is a 59 year old male who presents for the management of:    1. Type 2 DM:  Orginally diagnosed at the age of 33 or 38.  Was prediabetic prior, was not particularly symptomatic at the time, was noted on routine lab work    Current Regimen: Metformin 1000 mg qd, Lantus 31 units qd (started Lantus 5/2017).   The plan was to start Trulicity at his last visit but the copay was not affordable ($100 per refill).    Previously on metformin 2000 mg/day and glipizide 20 mg q am.  At first, he 'confessed' that he has not been taking his medications for about 18 months but then said \"no, I've been taking the Lantus and one metformin everyday\".    Has been unable to tolerate a higher dose of Metformin due to diarrhea.  Still has some diarrhea at the 1000 mg/day dose.    BS checks: None - not wearing Nina since April 2019.  Difficult for him to insert Nina due to his tremor.  Meter Download: Did not bring meter - not using his meter - he has a hard time doing fingerstick testing due to his tremor.    Last diabetes ed appointment:  9/2018.        Complications:   Diabetes Complications  Description / Detail    Diabetic Retinopathy  No retinopathy, early cataract, last exam - unsure.  Cataract surgery 9/2018   CAD / PAD  No   Neuropathy  Yes + diabetic neuropathy: numbness hands and feet.  Saw podiatry, Dr. Mathis, 1/27/2018- using diabetic shoes   Nephropathy / Microalbuminuria  No   Gastroparesis  No   Hypoglycemia [place]  Not sure, gets 'kind of dizzy' when blood sugar is low but reports history of low blood sugars without symptoms     2. Hypertension: Blood Pressure today:   BP Readings from Last 3 Encounters:   12/05/19 124/80   11/15/19 138/74   11/04/19 138/78   .  Blood pressure medications include atenolol 25 mg qd and losartan 50 mg every day.   3. Hyperlipidemia: Takes simvastatin 20 mg for lipid control.       4. " Prevention:  Flu Shot- 10/2019  Pneumovax- 2012  Opthalmology-Yes: annually  Dental-Yes: twice a year  ASA-Yes, 81 mg qd   Smoking- No  5.  Hypothyroidism. Currently treated with levoxyl (KAMERON) 125 mcg daily. Takes the levoxyl first thing in the morning and waits 30+ minutes before eating breakfast.  PA for Levoxyl approved through 2/18/2020.  PMH/PSH:  Past Medical History:   Diagnosis Date     Cellulitis and abscess of trunk 6/27/2017     Depression      Hyperlipidemia LDL goal <100 10/31/2010     Hypertension goal BP (blood pressure) < 140/90 3/17/2011     Hypothyroidism 1/12/2010     Morbid obesity due to excess calories (H) 1/12/2010     Neuropathy in diabetes (H) 1/12/2010     Obesity 1/12/2010     Sleep apnea 1/12/2010    CPAP     Type 2 diabetes, HbA1C goal < 8% (H) 3/8/2011     Past Surgical History:   Procedure Laterality Date     COLONOSCOPY       GENITOURINARY SURGERY      surg for undescended testicle     REPAIR HAMMER TOE BILATERAL  5/16/2013    Procedure: REPAIR HAMMER TOE BILATERAL;  Flexor Tenotomy Toes 2,3,4,5 Bilateral Feet;  Surgeon: Saad Bangura DPM;  Location: [facility] OR     Family Hx:  Family History   Problem Relation Age of Onset     Breast Cancer Mother      Hypertension Mother      Thyroid Disease Mother      Depression Mother      Alzheimer Disease Mother      Cancer - colorectal Father      Thyroid Disease Father      Depression Father      Heart Disease Maternal Grandmother         CHF     Circulatory Paternal Grandmother      Cancer Paternal Grandfather      Diabetes Brother      Diabetes Brother      Thyroid disease: Yes: mother         DM2: Yes: one brother with type 1 diabetes and one brother with type 2 diabetes, paternal aunt with DM2         Autoimmune: DM1, SLE, RA, Vitiligo Yes: brother with DM1    Social Hx:  Social History     Socioeconomic History     Marital status:      Spouse name: Sri     Number of children: 2     Years of education: Not on file     Highest  education level: Not on file   Occupational History     Occupation:      Employer: NONE      Comment: [employer]   Social Needs     Financial resource strain: Not on file     Food insecurity:     Worry: Not on file     Inability: Not on file     Transportation needs:     Medical: Not on file     Non-medical: Not on file   Tobacco Use     Smoking status: Never Smoker     Smokeless tobacco: Never Used   Substance and Sexual Activity     Alcohol use: Yes     Comment: occ     Drug use: No     Sexual activity: Not Currently     Partners: Female   Lifestyle     Physical activity:     Days per week: Not on file     Minutes per session: Not on file     Stress: Not on file   Relationships     Social connections:     Talks on phone: Not on file     Gets together: Not on file     Attends Holiness service: Not on file     Active member of club or organization: Not on file     Attends meetings of clubs or organizations: Not on file     Relationship status: Not on file     Intimate partner violence:     Fear of current or ex partner: Not on file     Emotionally abused: Not on file     Physically abused: Not on file     Forced sexual activity: Not on file   Other Topics Concern     Parent/sibling w/ CABG, MI or angioplasty before 65F 55M? No   Social History Narrative     Not on file          MEDICATIONS:  has a current medication list which includes the following prescription(s): ammonium lactate, aspirin, atenolol, blood glucose monitoring, bupropion, calcium carb-cholecalciferol, citalopram, freestyle juan 14 day reader, freestyle juan 14 day sensor, cyanocobalamin, ferrous sulfate, finasteride, hydrocortisone, insulin glargine, insulin pen needle, levothyroxine, losartan, metformin, multi-vitamin, mupirocin, omeprazole-sodium bicarbonate, order for dme, order for dme, simvastatin, sulfamethoxazole-trimethoprim, cholecalciferol, ammonium lactate, azithromycin, blood glucose, blood glucose, cyclobenzaprine,  dulaglutide, order for dme, and order for dme.    ROS     ROS: 10 point ROS neg other than the symptoms noted above in the HPI.    Physical Exam   VS: /80   Pulse 68   Wt 109.7 kg (241 lb 14.4 oz)   SpO2 97%   BMI 38.88 kg/m    GENERAL: AXOX3, NAD, well dressed, answering questions appropriately, appears stated age.  HEENT: no exopthalmous, no proptosis, no lig lag, no retraction  CV: RRR, no rubs, gallops, no murmurs  LUNGS: CTAB, no wheezes, rales, or ronchi  EXTREMITIES: trace edema at the ankles  NEUROLOGY: CN grossly intact, no tremors  MSK: grossly intact  SKIN: no rashes, no lesions    LABS:  A1c:   Component      Latest Ref Rng & Units 12/14/2018 8/29/2019 12/5/2019   Hemoglobin A1C      0 - 5.6 % 11.8 (H) 11.6 (H) 11.7 (H)     Basic Metabolic Panel:  !COMPREHENSIVE Latest Ref Rng & Units 10/24/2019   SODIUM 133 - 144 mmol/L 129 (L)   POTASSIUM 3.4 - 5.3 mmol/L 5.0   CHLORIDE 94 - 109 mmol/L 98   BUN 7 - 30 mg/dL 17   Creatinine 0.66 - 1.25 mg/dL 1.09   Glucose 70 - 99 mg/dL 285 (H)   ANION GAP 3 - 14 mmol/L 5   CALCIUM 8.5 - 10.1 mg/dL 8.8     Component      Latest Ref Rng & Units 8/29/2019   Glucose 316   C-Peptide      0.9 - 6.9 ng/mL 2.5     LFTS/Cholesterol Panel:  !LIPID/HEPATIC Latest Ref Rng & Units 8/29/2019   CHOLESTEROL <200 mg/dL 192   TRIGLYCERIDES <150 mg/dL 186 (H)   HDL CHOLESTEROL >39 mg/dL 34 (L)   LDL CHOLESTEROL, CALCULATED <100 mg/dL 121 (H)   VLDL-CHOLESTEROL 0 - 30 mg/dL    NON HDL CHOLESTEROL <130 mg/dL 158 (H)   CHOLESTEROL/HDL RATIO 0.0 - 5.0    AST 0 - 45 U/L 18   ALT 0 - 70 U/L 28     Thyroid Function:   !THYROID Latest Ref Rng & Units 12/14/2018   TSH 0.40 - 4.00 mU/L 2.19     Component    Latest Ref Rng & Units 10/19/2018   Thyroid Peroxidase Antibody    <35 IU/mL 11   Thyroglobulin Antibody    <40 IU/mL <20   Thyroid Stim Immunog    <=1.3 TSI index <1.0     Urine MicroAlbumin:  Component      Latest Ref Rng & Units 12/14/2018   Creatinine Urine      mg/dL 32   Albumin  "Urine mg/L      mg/L 20   Albumin Urine mg/g Cr      0 - 17 mg/g Cr 63.47 (H)     Vital Signs 4/2/2019 4/17/2019 8/29/2019   Weight (LB) 223 lb 236 lb 243 lb 3.2 oz   Height 5' 5\"  5' 6\"   BMI (Calculated) 37.11  39.25       All pertinent notes, labs, and images personally reviewed by me.     A/P  Mr.Walter Morgan is a 59 year old here for the management of diabetes:    1. DM2 - Uncontrolled.  A1c extremely elevated at 11.7%.  A1c has been in this range for the past year.  Dietary indiscretions contributing factor but also noted to have low insulin production based on a cpeptide of 2.5 with a glucose >300.  Diabetes is complicated by neuropathy  No blood sugar data for review today.  He has challenges with blood sugar testing due to limited use of his hands (neuropathy + tremor - both significant) and increasing difficulty with ADL's   Can't test his blood sugars using a meter due to the above.   He also has an extremely hard time inserting the Nina sensors due to neuropathy and tremor.    Change Metformin to extended release - continue 1000 mg daily.  If XR better tolerated, we can try increasing the Metformin dose.  Due to insulin deficiency, he likely needs treatment with MDI insulin.  Nina sensor placed today.  He'll return in 2 weeks for Nina download and review.  Consider increasing Metformin at that time if tolerating XR better.  Consider the addition of meal-time insulin depending on Nina results although unclear if insulin injections are also a challenge due to neuropathy and tremor - possibly consider insulin pump therapy which might be more manageable for him.  Schedule follow up with diabetes ed at the next available opening, which is in one month.    2. Hypothyroidism.  Currently treated with Levoxyl (KAMERON) 125 mcg/day.  Clinically and biochemically euthyroid. Continue Levoxyl 125 mcg/day.  Obtain TFT's today.    3.  Hypertension - Variable.      4. Hyperlipidemia - On statin therapy.    Labs ordered " today:   Orders Placed This Encounter   Procedures     Hemoglobin A1c     TSH with free T4 reflex     Albumin Random Urine Quantitative with Creat Ratio     AMBULATORY ADULT DIABETES EDUCATOR REFERRAL       Radiology/Consults ordered today: AMBULATORY ADULT DIABETES EDUCATOR REFERRAL    More than 50% of the time spent with Mr. Morgan on counseling / coordinating his care discussing the above plan of care.The patient indicates understanding of the above issues and agrees with the plan set forth.  Total face to face time was 30 minutes.       Follow-up:  3 months with mahendra Butler NP  Endocrinology  [facility]  CC: Lisa Pierre

## 2019-12-06 ENCOUNTER — THERAPY VISIT (OUTPATIENT)
Dept: PHYSICAL THERAPY | Facility: CLINIC | Age: 59
End: 2019-12-06
Attending: PHYSICIAN ASSISTANT
Payer: MEDICARE

## 2019-12-06 DIAGNOSIS — M25.512 CHRONIC LEFT SHOULDER PAIN: ICD-10-CM

## 2019-12-06 DIAGNOSIS — G89.29 CHRONIC LEFT SHOULDER PAIN: ICD-10-CM

## 2019-12-06 LAB
T4 FREE SERPL-MCNC: 1.19 NG/DL (ref 0.76–1.46)
TSH SERPL DL<=0.005 MIU/L-ACNC: 5.2 MU/L (ref 0.4–4)

## 2019-12-06 PROCEDURE — 97110 THERAPEUTIC EXERCISES: CPT | Mod: GP | Performed by: PHYSICAL THERAPIST

## 2019-12-06 PROCEDURE — 97161 PT EVAL LOW COMPLEX 20 MIN: CPT | Mod: GP | Performed by: PHYSICAL THERAPIST

## 2019-12-06 NOTE — LETTER
DEPARTMENT OF HEALTH AND HUMAN SERVICES  CENTERS FOR MEDICARE & MEDICAID SERVICES    PLAN/UPDATED PLAN OF PROGRESS FOR OUTPATIENT REHABILITATION    PATIENTS NAME:  Steve Morgan   : 1960  PROVIDER NUMBER:    [id number]  HICN: [id number]   PROVIDER NAME: [facility] [place] [facility]  MEDICAL RECORD NUMBER: [id number]   START OF CARE DATE: 19   TYPE:  PT  PRIMARY/TREATMENT DIAGNOSIS: Chronic left shoulder pain  VISITS FROM START OF CARE:  Rxs Used: 1     [place] [facility] Initial Evaluation  Subjective:  The history is provided by the patient. No  was used.   Steve Morgan being seen for Left shoulder.   Where condition occurred: other.Problem occurred: I think it is due to falls I have taken  and reported as 0/10 on pain scale. General health as reported by patient is good. Pertinent medical history includes:  Numbness/tingling and diabetes.  Other medical allergies details: none.  Surgeries include:  None.  Current medications:  Anti-depressants, high blood pressure medication, thyroid medication and other. Other medications details: See chart in [place] my chart. Primary job tasks include:  Computer work, driving, lifting/carrying, prolonged sitting and other. Other job/home tasks details: General house work.  Pain is described as aching and burning and is intermittent. Pain is the same all the time. Since onset symptoms are unchanged.  Patient is Retired. Work activity restrictions: not working, disabled.  Barriers include:  None as reported by patient. Red flags:  Changes in bowel/bladder, cold/hot extremity, foot drop, progressive neurological deficits and significant weakness.  Type of problem:  Left shoulder  Condition occurred with:  Unknown cause. This is a chronic condition   Problem details: Pt reports having intermittent left shoulder pain that has been present for greater than 6 months.  He reports falling multiple  times in past few months due to poor balance/neuropathy.  MD appt on 10/24/19. Patient reports pain:  In the joint. Radiates to:  Shoulder. Associated symptoms:  Loss of motion/stiffness and loss of strength. Symptoms are exacerbated by lying on extremity and relieved by activity/movement.                Objective:  Standing Alignment:    Cervical/Thoracic:  Forward head  Shoulder/UE:  Rounded shoulders, protracted scapula L and protracted scapula R  Flexibility/Screens:   Negative screens: Cervical or Shoulder (no sx's evident or provoked today in office)       Shoulder Evaluation:  ROM:  AROM:  normal  PROM:  normal  Pain: No pain,  End range tightness with full flexion and combined ext/ir on left  Strength:  normal  Stability Testing:  normal  Special Tests:  normal  Palpation:  normal  Mobility Tests:  normal      PATIENTS NAME:  Steve Morgan   : 1960        Assessment/Plan:    Patient is a 59 year old male with left side shoulder complaints.    Patient has the following significant findings with corresponding treatment plan.                Diagnosis 1:  L shoulder pain  Pain -  hot/cold therapy, self management, education, directional preference exercise and home program  Decreased function - therapeutic activities    Therapy Evaluation Codes:   Previous and current functional limitations:  (See Goal Flow Sheet for this information)    Short term and Long term goals: (See Goal Flow Sheet for this information)     Communication ability:  Patient appears to be able to clearly communicate and understand verbal and written communication and follow directions correctly.  Treatment Explanation - The following has been discussed with the patient:   RX ordered/plan of care  Anticipated outcomes  Possible risks and side effects  This patient would benefit from PT intervention to resume normal activities.   Rehab potential is good.    Frequency:  1 X week, once daily  Duration:  for 6 weeks  Discharge Plan:   "Achieve all LTG.  Independent in home treatment program.  Reach maximal therapeutic benefit.    Caregiver Signature/Credentials _____________________________ Date ________       Treating Provider: Saúl Reis, PT    I have reviewed and certified the need for these services and plan of treatment while under my care.        PHYSICIAN'S SIGNATURE:   _________________________________________  Date___________   SLIM Jean    Certification period:  Beginning of Cert date period: 12/06/19 to  End of Cert period date: 03/04/20     Functional Level Progress Report: Please see attached \"Goal Flow sheet for Functional level.\"    ____X____ Continue Services or       ________ DC Services                Service dates: From  SOC Date: 12/06/19 date to present                         "

## 2019-12-06 NOTE — PROGRESS NOTES
[facility] Initial Evaluation  Subjective:  The history is provided by the patient. No  was used.   Steve Morgan being seen for Left shoulder.   Where condition occurred: other.Problem occurred: I think it is due to falls I have taken  and reported as 0/10 on pain scale. General health as reported by patient is good. Pertinent medical history includes:  Numbness/tingling and diabetes.   Other medical allergies details: none.  Surgeries include:  None.  Current medications:  Anti-depressants, high blood pressure medication, thyroid medication and other. Other medications details: See chart in [place] my chart.   Primary job tasks include:  Computer work, driving, lifting/carrying, prolonged sitting and other. Other job/home tasks details: General house work.  Pain is described as aching and burning and is intermittent. Pain is the same all the time. Since onset symptoms are unchanged.      Patient is Retired. Work activity restrictions: not working, disabled.    Barriers include:  None as reported by patient.  Red flags:  Changes in bowel/bladder, cold/hot extremity, foot drop, progressive neurological deficits and significant weakness.  Type of problem:  Left shoulder   Condition occurred with:  Unknown cause. This is a chronic condition   Problem details: Pt reports having intermittent left shoulder pain that has been present for greater than 6 months.  He reports falling multiple times in past few months due to poor balance/neuropathy.  MD appt on 10/24/19..   Patient reports pain:  In the joint. Radiates to:  Shoulder. Associated symptoms:  Loss of motion/stiffness and loss of strength. Symptoms are exacerbated by lying on extremity and relieved by activity/movement.                      Objective:  Standing Alignment:    Cervical/Thoracic:  Forward head  Shoulder/UE:  Rounded shoulders, protracted scapula L and protracted scapula R                  Flexibility/Screens:    Negative screens: Cervical or Shoulder (no sx's evident or provoked today in office)                              Shoulder Evaluation:  ROM:  AROM:  normal                            PROM:  normal                            Pain: No pain,  End range tightness with full flexion and combined ext/ir on left    Strength:  normal                      Stability Testing:  normal      Special Tests:  normal      Palpation:  normal      Mobility Tests:  normal                                                 General     ROS    Assessment/Plan:    Patient is a 59 year old male with left side shoulder complaints.    Patient has the following significant findings with corresponding treatment plan.                Diagnosis 1:  L shoulder pain  Pain -  hot/cold therapy, self management, education, directional preference exercise and home program  Decreased function - therapeutic activities    Therapy Evaluation Codes:   Previous and current functional limitations:  (See Goal Flow Sheet for this information)    Short term and Long term goals: (See Goal Flow Sheet for this information)     Communication ability:  Patient appears to be able to clearly communicate and understand verbal and written communication and follow directions correctly.  Treatment Explanation - The following has been discussed with the patient:   RX ordered/plan of care  Anticipated outcomes  Possible risks and side effects  This patient would benefit from PT intervention to resume normal activities.   Rehab potential is good.    Frequency:  1 X week, once daily  Duration:  for 6 weeks  Discharge Plan:  Achieve all LTG.  Independent in home treatment program.  Reach maximal therapeutic benefit.    Please refer to the daily flowsheet for treatment today, total treatment time and time spent performing 1:1 timed codes.

## 2019-12-06 NOTE — LETTER
DEPARTMENT OF HEALTH AND HUMAN SERVICES  CENTERS FOR MEDICARE & MEDICAID SERVICES    PLAN/UPDATED PLAN OF PROGRESS FOR OUTPATIENT REHABILITATION    PATIENTS NAME:  Steve Morgan     : 1960    PROVIDER NUMBER:    [id number]    HICN:  [id number]     PROVIDER NAME: [facility] [place] [facility]    MEDICAL RECORD NUMBER: [id number]     START OF CARE DATE:  SOC Date: 19   TYPE:  PT    PRIMARY/TREATMENT DIAGNOSIS: (Pertinent Medical Diagnosis)  Chronic left shoulder pain    VISITS FROM START OF CARE:  Rxs Used: 1     [place] [facility] Initial Evaluation  Subjective:  The history is provided by the patient. No  was used.   Steve Morgan being seen for Left shoulder.   Where condition occurred: other.Problem occurred: I think it is due to falls I have taken  and reported as 0/10 on pain scale. General health as reported by patient is good. Pertinent medical history includes:  Numbness/tingling and diabetes.   Other medical allergies details: none.  Surgeries include:  None.  Current medications:  Anti-depressants, high blood pressure medication, thyroid medication and other. Other medications details: See chart in [place] my chart.   Primary job tasks include:  Computer work, driving, lifting/carrying, prolonged sitting and other. Other job/home tasks details: General house work.  Pain is described as aching and burning and is intermittent. Pain is the same all the time. Since onset symptoms are unchanged.      Patient is Retired. Work activity restrictions: not working, disabled.    Barriers include:  None as reported by patient.  Red flags:  Changes in bowel/bladder, cold/hot extremity, foot drop, progressive neurological deficits and significant weakness.  Type of problem:  Left shoulder   Condition occurred with:  Unknown cause. This is a chronic condition   Problem details: Pt reports having intermittent left shoulder pain that has  been present for greater than 6 months.  He reports falling multiple times in past few months due to poor balance/neuropathy.  MD appt on 10/24/19..   Patient reports pain:  In the joint. Radiates to:  Shoulder. Associated symptoms:  Loss of motion/stiffness and loss of strength. Symptoms are exacerbated by lying on extremity and relieved by activity/movement.                      Objective:  Standing Alignment:    Cervical/Thoracic:  Forward head  Shoulder/UE:  Rounded shoulders, protracted scapula L and protracted scapula R                  Flexibility/Screens:   Negative screens: Cervical or Shoulder (no sx's evident or provoked today in office)                              Shoulder Evaluation:  ROM:  AROM:  normal                            PROM:  normal                            Pain: No pain,  End range tightness with full flexion and combined ext/ir on left    Strength:  normal                      Stability Testing:  normal      Special Tests:  normal      Palpation:  normal      Mobility Tests:  normal                                                 General     ROS    Assessment/Plan:    Patient is a 59 year old male with left side shoulder complaints.    Patient has the following significant findings with corresponding treatment plan.                Diagnosis 1:  L shoulder pain  Pain -  hot/cold therapy, self management, education, directional preference exercise and home program  Decreased function - therapeutic activities    Therapy Evaluation Codes:   Previous and current functional limitations:  (See Goal Flow Sheet for this information)    Short term and Long term goals: (See Goal Flow Sheet for this information)     Communication ability:  Patient appears to be able to clearly communicate and understand verbal and written communication and follow directions correctly.  Treatment Explanation - The following has been discussed with the patient:   RX ordered/plan of care  Anticipated  "outcomes  Possible risks and side effects  This patient would benefit from PT intervention to resume normal activities.   Rehab potential is good.    Frequency:  1 X week, once daily  Duration:  for 6 weeks  Discharge Plan:  Achieve all LTG.  Independent in home treatment program.  Reach maximal therapeutic benefit.    Please refer to the daily flowsheet for treatment today, total treatment time and time spent performing 1:1 timed codes.         Caregiver Signature/Credentials _____________________________ Date ________       Treating Provider: Artur Reis PT   I have reviewed and certified the need for these services and plan of treatment while under my care.        PHYSICIAN'S SIGNATURE:   _________________________________________  Date___________   Jose Miguel Lambert    Certification period:  Beginning of Cert date period: 12/06/19 to  End of Cert period date: 03/04/20     Functional Level Progress Report: Please see attached \"Goal Flow sheet for Functional level.\"    ____X____ Continue Services or       ________ DC Services                Service dates: From  SOC Date: 12/06/19 date to present                         "

## 2019-12-08 NOTE — RESULT ENCOUNTER NOTE
Please call to find out if Caio consistently takes his Levoxyl or does/has he missed doses.   Route back to me to increase his dose if he has been consistently taking it.  Thanks,  Diana Butler NP  Endocrinology

## 2019-12-11 ENCOUNTER — MYC MEDICAL ADVICE (OUTPATIENT)
Dept: EDUCATION SERVICES | Facility: CLINIC | Age: 59
End: 2019-12-11

## 2019-12-11 ENCOUNTER — THERAPY VISIT (OUTPATIENT)
Dept: OCCUPATIONAL THERAPY | Facility: CLINIC | Age: 59
End: 2019-12-11
Payer: MEDICARE

## 2019-12-11 DIAGNOSIS — R29.898 DECREASED GRIP STRENGTH: Primary | ICD-10-CM

## 2019-12-11 PROCEDURE — 97535 SELF CARE MNGMENT TRAINING: CPT | Mod: GO | Performed by: OCCUPATIONAL THERAPIST

## 2019-12-12 PROBLEM — R29.898 DECREASED GRIP STRENGTH: Status: ACTIVE | Noted: 2019-12-12

## 2019-12-12 NOTE — TELEPHONE ENCOUNTER
"Caio messaged:  Hi Lucía,    The juan sensor you put on for me came off.  I fear because I wasn t careful enough taking off my shirt.  I put on a new sensor and activated it.  I hope this one stays on.  I will try to be more careful.  I thought you told me once that there is some kind of adhesive that might help it stay on.  Am I correct?  If so how do I get some and how does it work.  Could I you please share this with Diana as well?    Thank you    CDE reply:    Sammy Kearney,   This is Deandra, I'm one of the educators helping out while Lucía is out of the office for a bit.  I'm sorry to hear your sensor came off! It is not unusual when it is a new habit for you ;) It's easy to grab it with the shirt or rub it with a bath towel by accident.   There are indeed 2 \"tools\" that can help the sensor stay on a little better.   The adhesive you are asking about is called \"skin tac\". It is sometimes available at [employer] or other [employer], or they might be able to order. Of course, it's available on [employer]!   Some of my patients also use a clear medical tape called \"tegaderm\". Same suppliers.   Ideally the hole on the top of the sensor is not supposed to be covered, so would have to cut a tiny hole in the tegaderm tape.   You might check those ideas out and see what you think!  Best, Inessa Manzo RD, RHONDA, CDE    RHONDA Gibbs RD, CDE    "

## 2019-12-17 RX ORDER — LEVOTHYROXINE SODIUM 137 UG/1
137 TABLET ORAL DAILY
Qty: 90 TABLET | Refills: 1 | Status: SHIPPED | OUTPATIENT
Start: 2019-12-17 | End: 2020-03-12

## 2019-12-17 NOTE — RESULT ENCOUNTER NOTE
Caio,  I increased your Levoxyl dose to 137 mcg/day.  I don't think you need a new PA until 2/18/2020.  Please make another lab only appointment in 6-8 weeks to recheck thyroid levels.  Thanks,  Diana Butler NP  Endocrinology

## 2019-12-19 ENCOUNTER — THERAPY VISIT (OUTPATIENT)
Dept: PHYSICAL THERAPY | Facility: CLINIC | Age: 59
End: 2019-12-19
Payer: MEDICARE

## 2019-12-19 DIAGNOSIS — G89.29 CHRONIC LEFT SHOULDER PAIN: ICD-10-CM

## 2019-12-19 DIAGNOSIS — M25.512 CHRONIC LEFT SHOULDER PAIN: ICD-10-CM

## 2019-12-19 PROCEDURE — 97110 THERAPEUTIC EXERCISES: CPT | Mod: GP | Performed by: PHYSICAL THERAPIST

## 2019-12-19 NOTE — PROGRESS NOTES
DISCHARGE REPORT    Progress reporting period is from eval to 12/19/19.       SUBJECTIVE  Subjective changes noted by patient:  .  Subjective: Better. No pain reported today.  No sx's for past few weeks    Current pain level is  Current Pain level: 0/10.     Previous pain level was   Initial Pain level: 3/10.   Changes in function:  Yes (See Goal flowsheet attached for changes in current functional level)  Adverse reaction to treatment or activity: None    OBJECTIVE  Changes noted in objective findings:  Yes,   Objective: AROM WNL in L shoulder.  Strength 4+/5 generally in L shoulder     ASSESSMENT/PLAN  Updated problem list and treatment plan: Diagnosis 1:  L shoulder pain  Impaired muscle performance - neuro re-education  STG/LTGs have been met or progress has been made towards goals:  Yes (See Goal flow sheet completed today.)  Assessment of Progress: The patient's condition is improving.  The patient's condition has potential to improve.  The patient has met all of their long term goals.  Self Management Plans:  Patient has been instructed in a home treatment program.  Patient is independent in a home treatment program.  I have re-evaluated this patient and find that the nature, scope, duration and intensity of the therapy is appropriate for the medical condition of the patient.      Recommendations:  This patient is ready to be discharged from therapy and continue their home treatment program.    Please refer to the daily flowsheet for treatment today, total treatment time and time spent performing 1:1 timed codes.

## 2019-12-23 ENCOUNTER — THERAPY VISIT (OUTPATIENT)
Dept: OCCUPATIONAL THERAPY | Facility: CLINIC | Age: 59
End: 2019-12-23
Payer: MEDICARE

## 2019-12-23 ENCOUNTER — TELEPHONE (OUTPATIENT)
Dept: ENDOCRINOLOGY | Facility: CLINIC | Age: 59
End: 2019-12-23

## 2019-12-23 DIAGNOSIS — Z79.4 TYPE 2 DIABETES MELLITUS WITH DIABETIC NEUROPATHY, WITH LONG-TERM CURRENT USE OF INSULIN (H): Primary | ICD-10-CM

## 2019-12-23 DIAGNOSIS — R29.898 DECREASED GRIP STRENGTH: ICD-10-CM

## 2019-12-23 DIAGNOSIS — E11.40 TYPE 2 DIABETES MELLITUS WITH DIABETIC NEUROPATHY, WITH LONG-TERM CURRENT USE OF INSULIN (H): Primary | ICD-10-CM

## 2019-12-23 DIAGNOSIS — E11.65 UNCONTROLLED TYPE 2 DIABETES MELLITUS WITH HYPERGLYCEMIA (H): ICD-10-CM

## 2019-12-23 PROCEDURE — 97760 ORTHOTIC MGMT&TRAING 1ST ENC: CPT | Mod: GO | Performed by: OCCUPATIONAL THERAPIST

## 2019-12-23 NOTE — PROGRESS NOTES
SOAP Note objective information for 12/23/2019    Strength   (Measured in pounds)  Pain Report:  scale of 0-10/10   12/4/2019 12/4/2019 12/23/19 12/23/19   Trials L R L R   1  2  3 25 25 32 35   Average 25 25 32 35     Lat Pinch 12/4/2019 12/4/2019 12/23/19 12/23/19   Trials L R L R   1  2  3 6 6 8 7   Average 6 6 8 7     3 Pt Pinch 12/4/2019 12/4/2019 12/23/19 12/23/19   Trials L R L R   1  2  3 9 10 11 9   Average 9 10 11 9   Please refer to the daily flowsheet for treatment today, total treatment time and time spent performing 1:1 timed codes.

## 2019-12-23 NOTE — TELEPHONE ENCOUNTER
"Patient comes in to have Nina CGM downloaded per providers request at recent appointment on 12/05/19.  Report printed for dates:  12/09/19 - 12/22/19.  Patient states sensor fell off a few days early, yesterday when he took off his shirt.  Patient has since ordered the adhesive covers to help future sensors stay on.  Advised patient to call [employer] to see if they will replace the sensor that fell off early.  He will do this.  Report on Diana Butler's desk for when she returns next to the clinic on 1/02/20.  Patient aware provider is out of the clinic until then.      \"Per Diana Butler NP dictation dated: 12/05/19  Change Metformin to extended release - continue 1000 mg daily.  If XR better tolerated, we can try increasing the Metformin dose.  Due to insulin deficiency, he likely needs treatment with MDI insulin.  Nina sensor placed today.  He'll return in 2 weeks for Nina download and review.  Consider increasing Metformin at that time if tolerating XR better.  Consider the addition of meal-time insulin depending on Nina results although unclear if insulin injections are also a challenge due to neuropathy and tremor - possibly consider insulin pump therapy which might be more manageable for him.  Schedule follow up with diabetes ed at the next available opening, which is in one month.\"    Rani Heck CMA on 12/23/2019 at 3:01 PM  "

## 2020-01-03 NOTE — TELEPHONE ENCOUNTER
Please contact Caio - I reviewed the Nina CGM report and his blood sugars were very high - average glucose was 349.  I think we definitely need to change the diabetes treatment.  I would like him to make an appointment with diabetes ed at their next available opening to discuss treatment options - probably starting additional insulin before meals.  I'll place the diabetes ed referral.  Let me know if that plan doesn't work for Caio or he has additional questions.  Thanks,  Diana Butler NP  Endocrinology

## 2020-01-03 NOTE — TELEPHONE ENCOUNTER
Left message for patient to return phone call to the [facility].  Rani Heck CMA on 1/3/2020 at 10:15 AM

## 2020-01-03 NOTE — TELEPHONE ENCOUNTER
Spoke with patient.  Scheduled a diabetic education appointment with Lucía Chávez for 1/08/2020.  CGM report dated 12/09 - 12/22/19 given to Lucía Chávez for upcoming appointment.  Rani Heck CMA on 1/3/2020 at 11:34 AM

## 2020-01-07 ENCOUNTER — OFFICE VISIT (OUTPATIENT)
Dept: URGENT CARE | Facility: URGENT CARE | Age: 60
End: 2020-01-07
Payer: MEDICARE

## 2020-01-07 ENCOUNTER — MEDICAL CORRESPONDENCE (OUTPATIENT)
Dept: HEALTH INFORMATION MANAGEMENT | Facility: CLINIC | Age: 60
End: 2020-01-07

## 2020-01-07 VITALS
SYSTOLIC BLOOD PRESSURE: 154 MMHG | OXYGEN SATURATION: 100 % | TEMPERATURE: 98.5 F | DIASTOLIC BLOOD PRESSURE: 90 MMHG | HEART RATE: 69 BPM

## 2020-01-07 DIAGNOSIS — R07.0 THROAT PAIN: ICD-10-CM

## 2020-01-07 DIAGNOSIS — R68.89 FLU-LIKE SYMPTOMS: ICD-10-CM

## 2020-01-07 DIAGNOSIS — J22 LOWER RESPIRATORY TRACT INFECTION: Primary | ICD-10-CM

## 2020-01-07 LAB
DEPRECATED S PYO AG THROAT QL EIA: NORMAL
FLUAV+FLUBV AG SPEC QL: NEGATIVE
FLUAV+FLUBV AG SPEC QL: NEGATIVE
SPECIMEN SOURCE: NORMAL
SPECIMEN SOURCE: NORMAL

## 2020-01-07 PROCEDURE — 99214 OFFICE O/P EST MOD 30 MIN: CPT | Performed by: PHYSICIAN ASSISTANT

## 2020-01-07 PROCEDURE — 87880 STREP A ASSAY W/OPTIC: CPT | Performed by: PHYSICIAN ASSISTANT

## 2020-01-07 PROCEDURE — 87081 CULTURE SCREEN ONLY: CPT | Performed by: PHYSICIAN ASSISTANT

## 2020-01-07 PROCEDURE — 87804 INFLUENZA ASSAY W/OPTIC: CPT | Performed by: PHYSICIAN ASSISTANT

## 2020-01-07 RX ORDER — BENZONATATE 100 MG/1
100 CAPSULE ORAL 3 TIMES DAILY PRN
Qty: 30 CAPSULE | Refills: 0 | Status: SHIPPED | OUTPATIENT
Start: 2020-01-07 | End: 2020-01-16

## 2020-01-07 RX ORDER — AZITHROMYCIN 250 MG/1
TABLET, FILM COATED ORAL
Qty: 6 TABLET | Refills: 0 | Status: SHIPPED | OUTPATIENT
Start: 2020-01-07 | End: 2020-01-16

## 2020-01-07 RX ORDER — ALBUTEROL SULFATE 90 UG/1
2 AEROSOL, METERED RESPIRATORY (INHALATION) EVERY 6 HOURS PRN
Qty: 6.7 G | Refills: 0 | Status: SHIPPED | OUTPATIENT
Start: 2020-01-07 | End: 2020-05-20

## 2020-01-07 ASSESSMENT — ENCOUNTER SYMPTOMS
FEVER: 0
COUGH: 1
CHILLS: 0
VOMITING: 0
DIARRHEA: 0
NAUSEA: 0
SORE THROAT: 1
WHEEZING: 1
CHEST TIGHTNESS: 1

## 2020-01-07 NOTE — PROGRESS NOTES
"SUBJECTIVE:   Steve Morgan is a 59 year old male presenting with a chief complaint of   Chief Complaint   Patient presents with     Urgent Care     Cough     Cough since [place], Rib pain w/cough, Sinus headache, wheezing, chest \"rattle\" ST-Noted Pt is diabetic       He is an established patient of [facility].    URI Adult    Onset of symptoms was 2 week(s) ago.  Course of illness is worsening.    Severity moderate  Current and Associated symptoms: cough - productive, fatigue, sinus headache.  Treatment measures tried include OTC Cough med. Mucinex, Daiquil  Predisposing factors include None.  Denies fever or chills. NO SOB.       Review of Systems   Constitutional: Negative for chills and fever.   HENT: Positive for congestion and sore throat.    Respiratory: Positive for cough, chest tightness and wheezing.    Gastrointestinal: Negative for diarrhea, nausea and vomiting.       Past Medical History:   Diagnosis Date     Cellulitis and abscess of trunk 6/27/2017     Depression      Hyperlipidemia LDL goal <100 10/31/2010     Hypertension goal BP (blood pressure) < 140/90 3/17/2011     Hypothyroidism 1/12/2010     Morbid obesity due to excess calories (H) 1/12/2010     Neuropathy in diabetes (H) 1/12/2010     Obesity 1/12/2010     Sleep apnea 1/12/2010    CPAP     Type 2 diabetes, HbA1C goal < 8% (H) 3/8/2011     Family History   Problem Relation Age of Onset     Breast Cancer Mother      Hypertension Mother      Thyroid Disease Mother      Depression Mother      Alzheimer Disease Mother      Cancer - colorectal Father      Thyroid Disease Father      Depression Father      Heart Disease Maternal Grandmother         CHF     Circulatory Paternal Grandmother      Cancer Paternal Grandfather      Diabetes Brother      Diabetes Brother      Current Outpatient Medications   Medication Sig Dispense Refill     albuterol (PROAIR HFA/PROVENTIL HFA/VENTOLIN HFA) 108 (90 Base) MCG/ACT inhaler Inhale 2 puffs into the lungs " every 6 hours as needed for wheezing 6.7 g 0     ammonium lactate (AMLACTIN) 12 % external cream Apply topically daily Apply to feet daily 385 g 2     ammonium lactate (LAC-HYDRIN) 12 % cream Apply topically 2 times daily as needed for dry skin 385 g 3     ASPIRIN 81 MG OR TABS ONE DAILY 100 3     atenolol (TENORMIN) 25 MG tablet Take 1 tablet (25 mg) by mouth daily 90 tablet 0     azithromycin (ZITHROMAX) 250 MG tablet Take 2 tablets (500 mg) by mouth daily for 1 day, THEN 1 tablet (250 mg) daily for 4 days. 6 tablet 0     benzonatate (TESSALON) 100 MG capsule Take 1 capsule (100 mg) by mouth 3 times daily as needed for cough 30 capsule 0     buPROPion (WELLBUTRIN XL) 300 MG 24 hr tablet Take 1 tablet (300 mg) by mouth every morning 90 tablet 1     Calcium Carb-Cholecalciferol (CALCIUM 600 + D PO) Take 1 tablet by mouth daily       citalopram (CELEXA) 20 MG tablet TAKE 1 TABLET (20 MG) BY MOUTH DAILY 90 tablet 1     cyclobenzaprine (FLEXERIL) 10 MG tablet Take 1 tablet (10 mg) by mouth 3 times daily as needed for muscle spasms 30 tablet 1     ferrous sulfate 325 (65 FE) MG tablet Take 1 tablet by mouth daily (with breakfast).       finasteride (PROSCAR) 5 MG tablet Take 1 tablet (5 mg) by mouth daily 90 tablet 1     hydrocortisone (WESTCORT) 0.2 % cream Apply topically 2 times daily as needed Apply sparingly to affected area, BID. 45 g 1     insulin glargine (LANTUS SOLOSTAR) 100 UNIT/ML pen 30 units qd.  Increase as directed to max dose of 45 units qd. 45 mL 1     insulin pen needle (B-D U/F) 31G X 5 MM miscellaneous USE 1 DAILY OR AS DIRECTED. 100 each 3     LEVOXYL 137 MCG tablet Take 1 tablet (137 mcg) by mouth daily Dispense name brand Levoxyl only, no generics 90 tablet 1     losartan (COZAAR) 50 MG tablet Take 1 tablet (50 mg) by mouth daily 90 tablet 3     MULTI-VITAMIN OR TABS 1 TABLET DAILY 30 0     mupirocin (BACTROBAN) 2 % external ointment Apply topically 3 times daily 22 g 0     omeprazole-sodium  bicarbonate (ZEGERID)  MG per capsule Take 1 capsule by mouth every morning (before breakfast)       simvastatin (ZOCOR) 20 MG tablet Take 1 tablet (20 mg) by mouth At Bedtime 90 tablet 3     VITAMIN D, CHOLECALCIFEROL, PO Take 1,000 Units by mouth daily.       azithromycin (ZITHROMAX) 500 MG tablet Take one tablet daily for up to 3 days as needed for traveler's diarrhea (Patient not taking: Reported on 11/15/2019) 3 tablet 0     blood glucose (ONE TOUCH VERIO IQ) test strip Use to test blood sugars 2 times daily or as directed. 100 strip 0     blood glucose monitoring (KERLINE CONTOUR NEXT) test strip Use to test blood sugar 3 times daily or as directed. (Patient not taking: Reported on 12/5/2019) 100 strip 11     blood glucose monitoring (KERLINE MICROLET) lancets Use to test blood sugar 3 times daily or as directed. 100 each 11     Continuous Blood Gluc  (FREESTYLE GIULIANA 14 DAY READER) SIENA 1 each continuous 1 Device 0     Continuous Blood Gluc Sensor (FREESTYLE GIULIANA 14 DAY SENSOR) MISC 1 each every 14 days 2 each 11     Cyanocobalamin (VITAMIN B 12 PO) Take 250 mcg by mouth daily       metFORMIN (GLUCOPHAGE) 1000 MG tablet TAKE 1 TABLET BY MOUTH TWICE A DAY WITH MEALS  3     order for DME Equipment being ordered: Please dispense up to 3 pairs of knee high compression stockings at 20-30 mmHg and one donning device if needed. (Patient not taking: Reported on 12/5/2019) 4 Device 0     order for DME Equipment being ordered: Lt wrist splint (Patient not taking: Reported on 12/5/2019) 1 Device 0     ORDER FOR DME Equipment being ordered: four pronged cane 1 Units 0     ORDER FOR DME Equipment being ordered: single cane with rubber foot 1 Units 0     Social History     Tobacco Use     Smoking status: Never Smoker     Smokeless tobacco: Never Used   Substance Use Topics     Alcohol use: Yes     Comment: occ       OBJECTIVE  BP (!) 154/90 (BP Location: Right arm, Patient Position: Sitting, Cuff Size: Adult  Large)   Pulse 69   Temp 98.5  F (36.9  C) (Tympanic)   SpO2 100%     Physical Exam  Vitals signs and nursing note reviewed.   Constitutional:       General: He is not in acute distress.     Appearance: He is well-developed.   HENT:      Head: Normocephalic and atraumatic.      Right Ear: Tympanic membrane and external ear normal.      Left Ear: Tympanic membrane and external ear normal.      Mouth/Throat:      Mouth: Mucous membranes are moist.      Pharynx: Oropharynx is clear.   Eyes:      Conjunctiva/sclera: Conjunctivae normal.   Neck:      Musculoskeletal: Normal range of motion.   Cardiovascular:      Rate and Rhythm: Regular rhythm.      Heart sounds: Normal heart sounds.   Pulmonary:      Effort: Pulmonary effort is normal. No respiratory distress.      Breath sounds: Wheezing (With expiratory wheezing over the lung fields) and rhonchi present. No rales.   Skin:     General: Skin is warm and dry.   Neurological:      Mental Status: He is alert.         Labs:  Results for orders placed or performed in visit on 01/07/20 (from the past 24 hour(s))   Influenza A/B antigen   Result Value Ref Range    Influenza A/B Agn Specimen Nasal     Influenza A Negative NEG^Negative    Influenza B Negative NEG^Negative   Strep, Rapid Screen   Result Value Ref Range    Specimen Description Throat     Rapid Strep A Screen       NEGATIVE: No Group A streptococcal antigen detected by immunoassay, await culture report.         ASSESSMENT:      ICD-10-CM    1. Lower respiratory tract infection J22 azithromycin (ZITHROMAX) 250 MG tablet     benzonatate (TESSALON) 100 MG capsule     albuterol (PROAIR HFA/PROVENTIL HFA/VENTOLIN HFA) 108 (90 Base) MCG/ACT inhaler   2. Throat pain R07.0 Strep, Rapid Screen     Beta strep group A culture   3. Flu-like symptoms R68.89 Influenza A/B antigen          PLAN:    Lower respiratory tract infection: We have elected to treat today given length and  worsening of symptoms. Zithromax Rx. Tessalon  perles Rx prn cough. Albuterol inhaler Rx prn wheezing. Follow up if any worsening symptoms. Patient agrees.    Followup:    If not improving or if condition worsens, follow up with your Primary Care Provider

## 2020-01-08 ENCOUNTER — TELEPHONE (OUTPATIENT)
Dept: FAMILY MEDICINE | Facility: CLINIC | Age: 60
End: 2020-01-08

## 2020-01-08 ENCOUNTER — ALLIED HEALTH/NURSE VISIT (OUTPATIENT)
Dept: EDUCATION SERVICES | Facility: CLINIC | Age: 60
End: 2020-01-08
Payer: MEDICARE

## 2020-01-08 DIAGNOSIS — E11.40 TYPE 2 DIABETES MELLITUS WITH DIABETIC NEUROPATHY, WITHOUT LONG-TERM CURRENT USE OF INSULIN (H): ICD-10-CM

## 2020-01-08 DIAGNOSIS — R73.9 HYPERGLYCEMIA: ICD-10-CM

## 2020-01-08 DIAGNOSIS — E11.40 TYPE 2 DIABETES MELLITUS WITH DIABETIC NEUROPATHY, WITH LONG-TERM CURRENT USE OF INSULIN (H): Primary | ICD-10-CM

## 2020-01-08 DIAGNOSIS — Z79.4 TYPE 2 DIABETES MELLITUS WITH DIABETIC NEUROPATHY, WITH LONG-TERM CURRENT USE OF INSULIN (H): Primary | ICD-10-CM

## 2020-01-08 LAB
BACTERIA SPEC CULT: NORMAL
SPECIMEN SOURCE: NORMAL

## 2020-01-08 PROCEDURE — G0108 DIAB MANAGE TRN  PER INDIV: HCPCS | Performed by: DIETITIAN, REGISTERED

## 2020-01-08 RX ORDER — INSULIN ASPART 100 [IU]/ML
INJECTION, SOLUTION INTRAVENOUS; SUBCUTANEOUS
Qty: 45 ML | Refills: 3 | Status: SHIPPED | OUTPATIENT
Start: 2020-01-08 | End: 2020-03-05

## 2020-01-08 NOTE — PROGRESS NOTES
Diabetes Self-Management Education & Support      Diabetes Education Self-Management & Training: Follow-up and Continuous Glucose Monitor Download    Steve Cathy presents today for download and report review of Personal continuous glucose monitor device      Taking Medications:    Diabetes Medication(s)     Biguanides       metFORMIN (GLUCOPHAGE) 1000 MG tablet    TAKE 1 TABLET BY MOUTH TWICE A DAY WITH MEALS    Insulin       insulin glargine (LANTUS SOLOSTAR) 100 UNIT/ML pen    30 units qd.  Increase as directed to max dose of 45 units qd. - currently taking 31 units        Adherence: admits to forgetting medications  Taking Medication Assessed Today: Yes  Current Treatments: Insulin Injections, Oral Agent (monotherapy)  Dose schedule: at bedtime    Most Recent A1c Result:    Lab Results   Component Value Date    A1C 11.7 12/05/2019       Continuous Glucose Monitor Interpretation: (current CGM data unavailable, download is from 2 weeks ago)                               Healthy Eating  Healthy Eating Assessed Today: Yes  Patient on a regular basis: Eats 3 meals a day, Has a high intake of carbohydrates  Meals include: Breakfast, Lunch, Dinner, Snacks  Breakfast: large bowl of cocoa krispies or honey nut cheerios or oatmeal, 2 slices of toast with butter, 1 1/2 glasses of milk  Lunch: Hamburger, medium french fries, diet soda  Dinner: kielbasa, pork and beans or fries, cottage cheese  Snacks: nuts, 3-4 cookies  Beverages: Water, Milk, Diet soda  Has patient met with a dietitian in the past?: Yes    Being Active  Being Active Assessed Today: No  Exercise:: Currently not exercising  Barrier to exercise: Physical limitation    Monitoring  Monitoring Assessed Today: Yes  Blood Glucose Meter: CGM    Problem Solving  Problem Solving Assessed Today: Yes  Hypoglycemia Frequency: Never    Reducing Risks  Reducing Risks Assessed Today: No    Healthy Coping  Healthy Coping Assessed Today: Yes  Emotional response to diabetes:  "Concern for health and well-being  Stage of change: PREPARATION (Decided to change - considering how)  Difficulty affording diabetes management supplies?: No  Patient Activation Measure Survey Score:  SHANNON Score (Last Two) 3/8/2011 3/30/2017   SHANNON Raw Score 44 29   Activation Score 70.8 52.9   SHANNON Level 4 2         Assessment:  Medication and/or insulin dosing:  Needs improvement, reinforced importance of avoiding misses doses, pt planning to use a pill box, set reminders, etc. Discussed concerns with significant hyperglycemia, pt admits feeling more tired, \"eating has been all over the place\".     Per huddle with Diana Butler, NP- pt would benefit from adding meal time insulin which was discussed during today's visit. Suggest start with 5 units Humalog/Novolog before each meal. Pt agreeable to plan.     Pt agreed to work on portion control and try to keep carb intake more consistent. Provided specific examples based on current eating habits. Pt requested  a meal plan using the Food Exchange system which was also provided and explained. Pt plans to keep a food log, will record portions/ carbs to increase mindfulness. Answered questions about Weight Watchers program and diabetes support groups, etc.      Pt also requesting assistance today with placement of Nina sensor. Pt c/o \"sensor comes off after a few days\". Sensor placed today without difficulty- used Tagaderm + Fixit patch for additional adhesion. Pt agreeable to one week follow up to review CGM data and assess insulin dosing. Advised on importance of ongoing dose adjustment and potential addition of sliding scale at future visit.       Goals        General    Taking Medication     Notes - Note created  1/8/2020 12:56 PM by Lucía Chávez RD    My Goal: I will remember to take medications as prescribed    What I need to meet my goal: get a pill box, set reminders    I plan to meet my goal by this date: next CDE visit, 1 week              INTERVENTION: "   Diabetes knowledge and skills assessment:     Patient is knowledgeable in diabetes management concepts related to: Healthy Eating, Monitoring, Taking Medication, Problem Solving and Reducing Risks    Patient needs further education on the following diabetes management concepts: Healthy Eating, Taking Medication, Problem Solving and Healthy Coping    Based on learning assessment above, most appropriate setting for further diabetes education would be: Individual setting.    Education provided today on:  AADE Self-Care Behaviors:  Healthy Eating: carbohydrate counting vs weight watchers vs exchange diet, mindful eating and portion control, making healthy choices, consistency in amount, composition, and timing of food intake  Taking Medication: action of prescribed medication, when to take medications and matching meal time insulin with food  Problem Solving: high blood glucose - causes, signs/symptoms, treatment and prevention, low blood glucose - causes, signs/symptoms, treatment and prevention, carrying a carbohydrate source at all times and when to call health care provider  Healthy Coping: benefits of making appropriate lifestyle changes    CGM-specific education:   Nina [place]: frequency of scanning sensor, length of time data is visible, use of built in glucose meter, Precision X-tra test strips    Pt verbalized understanding of concepts discussed and recommendations provided today.       Education Materials Provided:  Carbohydrate Counting/ Exchange Diet    PLAN:  See Patient Instructions for co-developed, patient-stated behavior change goals.  AVS printed and provided to patient today. See Follow-Up section for recommended follow-up.    Please increase your Lantus dose to 35 units.    Start Novolog/Humalog 5 units before each meal. Take 5- 15 minutes before you eat.     Start keeping a food log, include portions and carbs. You can find carb information at CalorieKing.com (phone suleman or web site). Try not to  go over 60-75 gms of carbs per meal.     Lucía Chávez RD, MELISAE  Diabetes     Time Spent: 75 minutes  Encounter Type: Individual    Any diabetes medication dose changes were made via the CDE Protocol and Collaborative Practice Agreement with the patient's endocrinology provider. A copy of this encounter was shared with the provider.    Addendum:  I agree with the above plan of care as previously discussed with TOSHA Ramirez, and as documented in the above encounter.  Diana Butler NP  Endocrinology

## 2020-01-08 NOTE — PATIENT INSTRUCTIONS
Please increase your Lantus dose to 35 units.    Start Novolog/Humalog 5 units before each meal. Take 5- 15 minutes before you eat.     Start keeping a food log, include portions and carbs. You can find carb information at CalorieKing.com (phone suleman or web site). Try not to go over 60-75 gms of carbs per meal.     Lucía Chávez RD, CDE  Diabetes     [facility] for the [facility]:  For Your Diabetes Education or Nutrition Appointments Call:  [phone]   For Nutrition Related Questions Call:   Phone: [phone]  E-mail: [email or web address]  Fax: [phone]   If you need a medication refill please contact your pharmacy. Please allow 3 business days for your refills to be completed.

## 2020-01-08 NOTE — Clinical Note
Caio Brown was agreeable to meal time insulin.  Per our conversation, Novolog rx sent to pharmacy.  Will see Caio back in 1 week.Please indicate whether you approve of plan. Thanks!Lucía Chávez RD, CDEDiabetes

## 2020-01-08 NOTE — TELEPHONE ENCOUNTER
Reason for call:  Form   Our goal is to have forms completed within 72 hours, however some forms may require a visit or additional information.     Who is the form from? Patient  Where did the form come from? Patient or family brought in     What clinic location was the form placed at? [place]  Where was the form placed? [facility]/Folder  What number is listed as a contact on the form? [phone]    Phone call message - patient request for a letter, form or note:     Date needed: within one week  Please fax to [phone]  Has the patient signed a consent form for release of information? Not Applicable    Additional comments:     Type of letter, form or note: Diabetic Shoes    Phone number to reach patient:  Cell number on file:    Telephone Information:   Mobile [phone]       Best Time:  any    Can we leave a detailed message on this number?  YES

## 2020-01-08 NOTE — LETTER
1/8/2020         RE: Steve Morgan  [id number]  [facility] [address]        Dear Colleague,    Thank you for referring your patient, Steve Morgan, to the [facility]. Please see a copy of my visit note below.    Diabetes Self-Management Education & Support      Diabetes Education Self-Management & Training: Follow-up and Continuous Glucose Monitor Download    Steve Morgan presents today for download and report review of Personal continuous glucose monitor device      Taking Medications:    Diabetes Medication(s)     Biguanides       metFORMIN (GLUCOPHAGE) 1000 MG tablet    TAKE 1 TABLET BY MOUTH TWICE A DAY WITH MEALS    Insulin       insulin glargine (LANTUS SOLOSTAR) 100 UNIT/ML pen    30 units qd.  Increase as directed to max dose of 45 units qd. - currently taking 31 units        Adherence: admits to forgetting medications  Taking Medication Assessed Today: Yes  Current Treatments: Insulin Injections, Oral Agent (monotherapy)  Dose schedule: at bedtime    Most Recent A1c Result:    Lab Results   Component Value Date    A1C 11.7 12/05/2019       Continuous Glucose Monitor Interpretation: (current CGM data unavailable, download is from 2 weeks ago)                               Healthy Eating  Healthy Eating Assessed Today: Yes  Patient on a regular basis: Eats 3 meals a day, Has a high intake of carbohydrates  Meals include: Breakfast, Lunch, Dinner, Snacks  Breakfast: large bowl of cocoa krispies or honey nut cheerios or oatmeal, 2 slices of toast with butter, 1 1/2 glasses of milk  Lunch: Hamburger, medium french fries, diet soda  Dinner: kielbasa, pork and beans or fries, cottage cheese  Snacks: nuts, 3-4 cookies  Beverages: Water, Milk, Diet soda  Has patient met with a dietitian in the past?: Yes    Being Active  Being Active Assessed Today: No  Exercise:: Currently not exercising  Barrier to exercise: Physical limitation    Monitoring  Monitoring Assessed Today:  "Yes  Blood Glucose Meter: CGM    Problem Solving  Problem Solving Assessed Today: Yes  Hypoglycemia Frequency: Never    Reducing Risks  Reducing Risks Assessed Today: No    Healthy Coping  Healthy Coping Assessed Today: Yes  Emotional response to diabetes: Concern for health and well-being  Stage of change: PREPARATION (Decided to change - considering how)  Difficulty affording diabetes management supplies?: No  Patient Activation Measure Survey Score:  SHANNON Score (Last Two) 3/8/2011 3/30/2017   SHANNON Raw Score 44 29   Activation Score 70.8 52.9   SHANNON Level 4 2         Assessment:  Medication and/or insulin dosing:  Needs improvement, reinforced importance of avoiding misses doses, pt planning to use a pill box, set reminders, etc. Discussed concerns with significant hyperglycemia, pt admits feeling more tired, \"eating has been all over the place\".     Per huddle with Diana Butler, NP- pt would benefit from adding meal time insulin which was discussed during today's visit. Suggest start with 5 units Humalog/Novolog before each meal. Pt agreeable to plan.     Pt agreed to work on portion control and try to keep carb intake more consistent. Provided specific examples based on current eating habits. Pt requested  a meal plan using the Food Exchange system which was also provided and explained. Pt plans to keep a food log, will record portions/ carbs to increase mindfulness. Answered questions about Weight Watchers program and diabetes support groups, etc.      Pt also requesting assistance today with placement of Nina sensor. Pt c/o \"sensor comes off after a few days\". Sensor placed today without difficulty- used Tagaderm + Fixit patch for additional adhesion. Pt agreeable to one week follow up to review CGM data and assess insulin dosing. Advised on importance of ongoing dose adjustment and potential addition of sliding scale at future visit.       Goals        General    Taking Medication     Notes - Note created  " 1/8/2020 12:56 PM by Lucía Chávez RD    My Goal: I will remember to take medications as prescribed    What I need to meet my goal: get a pill box, set reminders    I plan to meet my goal by this date: next CDE visit, 1 week              INTERVENTION:   Diabetes knowledge and skills assessment:     Patient is knowledgeable in diabetes management concepts related to: Healthy Eating, Monitoring, Taking Medication, Problem Solving and Reducing Risks    Patient needs further education on the following diabetes management concepts: Healthy Eating, Taking Medication, Problem Solving and Healthy Coping    Based on learning assessment above, most appropriate setting for further diabetes education would be: Individual setting.    Education provided today on:  AADE Self-Care Behaviors:  Healthy Eating: carbohydrate counting vs weight watchers vs exchange diet, mindful eating and portion control, making healthy choices, consistency in amount, composition, and timing of food intake  Taking Medication: action of prescribed medication, when to take medications and matching meal time insulin with food  Problem Solving: high blood glucose - causes, signs/symptoms, treatment and prevention, low blood glucose - causes, signs/symptoms, treatment and prevention, carrying a carbohydrate source at all times and when to call health care provider  Healthy Coping: benefits of making appropriate lifestyle changes    CGM-specific education:   Nina [place]: frequency of scanning sensor, length of time data is visible, use of built in glucose meter, Precision X-tra test strips    Pt verbalized understanding of concepts discussed and recommendations provided today.       Education Materials Provided:  Carbohydrate Counting/ Exchange Diet    PLAN:  See Patient Instructions for co-developed, patient-stated behavior change goals.  AVS printed and provided to patient today. See Follow-Up section for recommended follow-up.    Please increase your  Lantus dose to 35 units.    Start Novolog/Humalog 5 units before each meal. Take 5- 15 minutes before you eat.     Start keeping a food log, include portions and carbs. You can find carb information at [employer]orie[employer] (phone suleman or web site). Try not to go over 60-75 gms of carbs per meal.     Lucía Chávez RD, CDE  Diabetes     Time Spent: 75 minutes  Encounter Type: Individual    Any diabetes medication dose changes were made via the CDE Protocol and Collaborative Practice Agreement with the patient's endocrinology provider. A copy of this encounter was shared with the provider.

## 2020-01-09 ENCOUNTER — MEDICAL CORRESPONDENCE (OUTPATIENT)
Dept: HEALTH INFORMATION MANAGEMENT | Facility: CLINIC | Age: 60
End: 2020-01-09

## 2020-01-09 NOTE — TELEPHONE ENCOUNTER
ROSE MARY SPANN PA-C signed.  Paulo VERNON signature.   In Dr. Fowler's basket.     Rose Mary Spann PA-C

## 2020-01-15 ENCOUNTER — ALLIED HEALTH/NURSE VISIT (OUTPATIENT)
Dept: EDUCATION SERVICES | Facility: CLINIC | Age: 60
End: 2020-01-15
Payer: MEDICARE

## 2020-01-15 VITALS — WEIGHT: 241.5 LBS | BODY MASS INDEX: 38.81 KG/M2

## 2020-01-15 DIAGNOSIS — R73.9 HYPERGLYCEMIA: ICD-10-CM

## 2020-01-15 DIAGNOSIS — E11.40 TYPE 2 DIABETES MELLITUS WITH DIABETIC NEUROPATHY, WITHOUT LONG-TERM CURRENT USE OF INSULIN (H): ICD-10-CM

## 2020-01-15 PROCEDURE — G0108 DIAB MANAGE TRN  PER INDIV: HCPCS | Performed by: DIETITIAN, REGISTERED

## 2020-01-15 ASSESSMENT — ENCOUNTER SYMPTOMS
NERVOUS/ANXIOUS: 1
JOINT SWELLING: 0
HEMATURIA: 0
HEARTBURN: 0
PARESTHESIAS: 0
PALPITATIONS: 0
HEMATOCHEZIA: 0
NAUSEA: 0
DIZZINESS: 0
MYALGIAS: 0
FREQUENCY: 0
EYE PAIN: 0
CONSTIPATION: 0
SORE THROAT: 0
CHILLS: 0
ABDOMINAL PAIN: 0
DYSURIA: 0
SHORTNESS OF BREATH: 0
HEADACHES: 1
ARTHRALGIAS: 0
COUGH: 0
WEAKNESS: 0
FEVER: 0
DIARRHEA: 1

## 2020-01-15 ASSESSMENT — ACTIVITIES OF DAILY LIVING (ADL): CURRENT_FUNCTION: NO ASSISTANCE NEEDED

## 2020-01-15 NOTE — LETTER
"    1/15/2020         RE: Steve Morgan  [id number]  [facility] [address]        Dear Colleague,    Thank you for referring your patient, Steve Morgan, to the [facility]. Please see a copy of my visit note below.    Diabetes Self-Management Education & Support    Diabetes Education Self Management & Training    SUBJECTIVE/OBJECTIVE:  Presents for: Follow-up  Accompanied by: Self  Diabetes education in the past 24mo: Yes  Focus of Visit: CGM, Healthy Eating, Taking Medication  Diabetes type: Type 2  Disease course: Worsening  How confident are you filling out medical forms by yourself:: Not Assessed  Transportation concerns: No  Other concerns:: Physical impairment  Cultural Influences/Ethnic Background:  American    Diabetes Symptoms & Complications  Fatigue: Yes  Neuropathy: Yes  Weight trend: Stable  Peripheral neuropathy: Yes  Peripheral Vascular Disease: Yes    Patient Problem List and Family Medical History reviewed for relevant medical history, current medical status, and diabetes risk factors.    Vitals:  Wt 109.5 kg (241 lb 8 oz)   BMI 38.81 kg/m     Estimated body mass index is 38.81 kg/m  as calculated from the following:    Height as of 10/24/19: 1.68 m (5' 6.14\").    Weight as of this encounter: 109.5 kg (241 lb 8 oz).   Last 3 BP:   BP Readings from Last 3 Encounters:   01/07/20 (!) 154/90   12/05/19 124/80   11/15/19 138/74       History   Smoking Status     Never Smoker   Smokeless Tobacco     Never Used       Labs:  Lab Results   Component Value Date    A1C 11.7 12/05/2019     Lab Results   Component Value Date     10/24/2019     Lab Results   Component Value Date     08/29/2019     HDL Cholesterol   Date Value Ref Range Status   08/29/2019 34 (L) >39 mg/dL Final   ]  GFR Estimate   Date Value Ref Range Status   10/24/2019 73 >60 mL/min/[1.73_m2] Final     Comment:     Non  GFR Calc  Starting 12/18/2018, serum creatinine based " estimated GFR (eGFR) will be   calculated using the Chronic Kidney Disease Epidemiology Collaboration   (CKD-EPI) equation.       GFR Estimate If Black   Date Value Ref Range Status   10/24/2019 84 >60 mL/min/[1.73_m2] Final     Comment:      GFR Calc  Starting 12/18/2018, serum creatinine based estimated GFR (eGFR) will be   calculated using the Chronic Kidney Disease Epidemiology Collaboration   (CKD-EPI) equation.       Lab Results   Component Value Date    CR 1.09 10/24/2019     No results found for: MICROALBUMIN    Healthy Eating  Healthy Eating Assessed Today: Yes  Cultural/Synagogue diet restrictions?: No  Patient on a regular basis: Eats out more than once a week, Keeps food records  Meals include: Breakfast, Lunch, Dinner, Snacks  Beverages: Water, Milk, Diet soda  Has patient met with a dietitian in the past?: Yes                Being Active  Being Active Assessed Today: No  Exercise:: Currently not exercising  Barrier to exercise: Physical limitation    Monitoring  Monitoring Assessed Today: Yes  Blood Glucose Meter: CGM                  Taking Medications  Diabetes Medication(s)     Biguanides       metFORMIN (GLUCOPHAGE) 1000 MG tablet    TAKE 1 TABLET BY MOUTH TWICE A DAY WITH MEALS    Insulin       insulin glargine (LANTUS SOLOSTAR) 100 UNIT/ML pen    35 units qd.  Increase as directed to max dose of 45 units qd.     NOVOLOG FLEXPEN 100 UNIT/ML soln    Take 5 units before each meal. Increase as directed up to 45 units per day.          Taking Medication Assessed Today: Yes  Current Treatments: Insulin Injections, Oral Agent (monotherapy)  Dose schedule: at bedtime, pre-breakfast, pre-lunch, pre-dinner  Problems taking diabetes medications regularly?: No  Diabetes medication side effects?: No  Treatment Compliance: Most of the time    Problem Solving  Problem Solving Assessed Today: Yes  Hypoglycemia Frequency: Never    Reducing Risks  Reducing Risks Assessed Today: No    Healthy  "Coping  Healthy Coping Assessed Today: Yes  Emotional response to diabetes: Concern for health and well-being  Stage of change: PREPARATION (Decided to change - considering how)  Difficulty affording diabetes management supplies?: No  Patient Activation Measure Survey Score:  SHANNON Score (Last Two) 3/8/2011 3/30/2017   SHANNON Raw Score 44 29   Activation Score 70.8 52.9   SHANNON Level 4 2       ASSESSMENT:  Pt requesting review of nutrition recommendations and food record, reports keeping a log has been very helpful. Noted some discrepancies in carb counts which he will pay closer attention to. Has been working on portion control, commended on progress.    Noted significant BG improvement since addition of meal time insulin 1 week ago. Pt reports has been taking the Novolog \"off and on\", was concerned that if blood sugar was in target-  \"might go too low if took the Novolog\".  Reviewed importance of matching carbs with insulin.  May benefit from a flexible I:C + correction regimen, however pt prefers to continue current plan for now, pt will work on a consistent carb intake.   Had one episode of noc hypoglycemia, noted some BG downward trends over-night. Pt may benefit from decrease in Lantus, per discussion with Diana Butler NP. Recommend reduce Lantus dose from 35 units --> 30 units.   Pt is going out of town next week to [place], discussed safety measures regarding increased risk for hypoglycemia when more active. Advised to monitor blood sugars closely.     Patient's most recent   Lab Results   Component Value Date    A1C 11.7 12/05/2019    is not meeting goal of <7.0    INTERVENTION:   Diabetes knowledge and skills assessment:     Patient is knowledgeable in diabetes management concepts related to: Healthy Eating, Being Active, Monitoring, Taking Medication, Problem Solving, Reducing Risks and Healthy Coping    Patient needs further education on the following diabetes management concepts: Healthy Eating, Being Active, " Taking Medication and Problem Solving    Based on learning assessment above, most appropriate setting for further diabetes education would be: Individual setting.    Education provided today on:  AADE Self-Care Behaviors:  Healthy Eating: weight reduction, portion control, menu ideas and mindful eating  Being Active: relationship to blood glucose and precautions to take  Taking Medication: action of prescribed medication and when to take medications  Problem Solving: high blood glucose - causes, signs/symptoms, treatment and prevention, low blood glucose - causes, signs/symptoms, treatment and prevention, carrying a carbohydrate source at all times, safe travel and when to call health care provider    Opportunities for ongoing education and support in diabetes-self management were discussed.    Pt verbalized understanding of concepts discussed and recommendations provided today.       Education Materials Provided:  No new materials provided today    PLAN:  See Patient Instructions for co-developed, patient-stated behavior change goals.  AVS printed and provided to patient today. See Follow-Up section for recommended follow-up.    Lucía Chávez RD, CDE  Diabetes     Time Spent: 60 minutes  Encounter Type: Individual    Any diabetes medication dose changes were made via the CDE Protocol and Collaborative Practice Agreement with the patient's endocrinology provider. A copy of this encounter was shared with the provider.

## 2020-01-15 NOTE — PROGRESS NOTES
"Diabetes Self-Management Education & Support    Diabetes Education Self Management & Training    SUBJECTIVE/OBJECTIVE:  Presents for: Follow-up  Accompanied by: Self  Diabetes education in the past 24mo: Yes  Focus of Visit: CGM, Healthy Eating, Taking Medication  Diabetes type: Type 2  Disease course: Worsening  How confident are you filling out medical forms by yourself:: Not Assessed  Transportation concerns: No  Other concerns:: Physical impairment  Cultural Influences/Ethnic Background:  American    Diabetes Symptoms & Complications  Fatigue: Yes  Neuropathy: Yes  Weight trend: Stable  Peripheral neuropathy: Yes  Peripheral Vascular Disease: Yes    Patient Problem List and Family Medical History reviewed for relevant medical history, current medical status, and diabetes risk factors.    Vitals:  Wt 109.5 kg (241 lb 8 oz)   BMI 38.81 kg/m    Estimated body mass index is 38.81 kg/m  as calculated from the following:    Height as of 10/24/19: 1.68 m (5' 6.14\").    Weight as of this encounter: 109.5 kg (241 lb 8 oz).   Last 3 BP:   BP Readings from Last 3 Encounters:   01/07/20 (!) 154/90   12/05/19 124/80   11/15/19 138/74       History   Smoking Status     Never Smoker   Smokeless Tobacco     Never Used       Labs:  Lab Results   Component Value Date    A1C 11.7 12/05/2019     Lab Results   Component Value Date     10/24/2019     Lab Results   Component Value Date     08/29/2019     HDL Cholesterol   Date Value Ref Range Status   08/29/2019 34 (L) >39 mg/dL Final   ]  GFR Estimate   Date Value Ref Range Status   10/24/2019 73 >60 mL/min/[1.73_m2] Final     Comment:     Non  GFR Calc  Starting 12/18/2018, serum creatinine based estimated GFR (eGFR) will be   calculated using the Chronic Kidney Disease Epidemiology Collaboration   (CKD-EPI) equation.       GFR Estimate If Black   Date Value Ref Range Status   10/24/2019 84 >60 mL/min/[1.73_m2] Final     Comment:      " GFR Calc  Starting 12/18/2018, serum creatinine based estimated GFR (eGFR) will be   calculated using the Chronic Kidney Disease Epidemiology Collaboration   (CKD-EPI) equation.       Lab Results   Component Value Date    CR 1.09 10/24/2019     No results found for: MICROALBUMIN    Healthy Eating  Healthy Eating Assessed Today: Yes  Cultural/Orthodox diet restrictions?: No  Patient on a regular basis: Eats out more than once a week, Keeps food records  Meals include: Breakfast, Lunch, Dinner, Snacks  Beverages: Water, Milk, Diet soda  Has patient met with a dietitian in the past?: Yes                Being Active  Being Active Assessed Today: No  Exercise:: Currently not exercising  Barrier to exercise: Physical limitation    Monitoring  Monitoring Assessed Today: Yes  Blood Glucose Meter: CGM                  Taking Medications  Diabetes Medication(s)     Biguanides       metFORMIN (GLUCOPHAGE) 1000 MG tablet    TAKE 1 TABLET BY MOUTH TWICE A DAY WITH MEALS    Insulin       insulin glargine (LANTUS SOLOSTAR) 100 UNIT/ML pen    35 units qd.  Increase as directed to max dose of 45 units qd.     NOVOLOG FLEXPEN 100 UNIT/ML soln    Take 5 units before each meal. Increase as directed up to 45 units per day.          Taking Medication Assessed Today: Yes  Current Treatments: Insulin Injections, Oral Agent (monotherapy)  Dose schedule: at bedtime, pre-breakfast, pre-lunch, pre-dinner  Problems taking diabetes medications regularly?: No  Diabetes medication side effects?: No  Treatment Compliance: Most of the time    Problem Solving  Problem Solving Assessed Today: Yes  Hypoglycemia Frequency: Never    Reducing Risks  Reducing Risks Assessed Today: No    Healthy Coping  Healthy Coping Assessed Today: Yes  Emotional response to diabetes: Concern for health and well-being  Stage of change: PREPARATION (Decided to change - considering how)  Difficulty affording diabetes management supplies?: No  Patient Activation Measure  "Survey Score:  SHANNON Score (Last Two) 3/8/2011 3/30/2017   SHANNON Raw Score 44 29   Activation Score 70.8 52.9   SHANNON Level 4 2       ASSESSMENT:  Pt requesting review of nutrition recommendations and food record, reports keeping a log has been very helpful. Noted some discrepancies in carb counts which he will pay closer attention to. Has been working on portion control, commended on progress.    Noted significant BG improvement since addition of meal time insulin 1 week ago. Pt reports has been taking the Novolog \"off and on\", was concerned that if blood sugar was in target-  \"might go too low if took the Novolog\".  Reviewed importance of matching carbs with insulin.  May benefit from a flexible I:C + correction regimen, however pt prefers to continue current plan for now, pt will work on a consistent carb intake.   Had one episode of noc hypoglycemia, noted some BG downward trends over-night. Pt may benefit from decrease in Lantus, per discussion with Diana Butler NP. Recommend reduce Lantus dose from 35 units --> 30 units.   Pt is going out of town next week to [place], discussed safety measures regarding increased risk for hypoglycemia when more active. Advised to monitor blood sugars closely.     Patient's most recent   Lab Results   Component Value Date    A1C 11.7 12/05/2019    is not meeting goal of <7.0    INTERVENTION:   Diabetes knowledge and skills assessment:     Patient is knowledgeable in diabetes management concepts related to: Healthy Eating, Being Active, Monitoring, Taking Medication, Problem Solving, Reducing Risks and Healthy Coping    Patient needs further education on the following diabetes management concepts: Healthy Eating, Being Active, Taking Medication and Problem Solving    Based on learning assessment above, most appropriate setting for further diabetes education would be: Individual setting.    Education provided today on:  AADE Self-Care Behaviors:  Healthy Eating: weight reduction, " portion control, menu ideas and mindful eating  Being Active: relationship to blood glucose and precautions to take  Taking Medication: action of prescribed medication and when to take medications  Problem Solving: high blood glucose - causes, signs/symptoms, treatment and prevention, low blood glucose - causes, signs/symptoms, treatment and prevention, carrying a carbohydrate source at all times, safe travel and when to call health care provider    Opportunities for ongoing education and support in diabetes-self management were discussed.    Pt verbalized understanding of concepts discussed and recommendations provided today.       Education Materials Provided:  No new materials provided today    PLAN:  See Patient Instructions for co-developed, patient-stated behavior change goals.  AVS printed and provided to patient today. See Follow-Up section for recommended follow-up.    Lucía Chávez RD, CDE  Diabetes     Time Spent: 60 minutes  Encounter Type: Individual    Any diabetes medication dose changes were made via the CDE Protocol and Collaborative Practice Agreement with the patient's endocrinology provider. A copy of this encounter was shared with the provider.

## 2020-01-15 NOTE — PATIENT INSTRUCTIONS
Please reduce your Lantus dose to 30 units.     Bring an extra sensor on your trip. You can go thru the metal detector, but avoid going thru the body scanner. You can let them know you are wearing a medical device.     Great job on the blood sugar improvement and keeping a food record. As discussed, try to keep track of your Novolog doses. Try to keep your carb intake consistent for now, we can discuss a more flexible dosing schedule at an upcoming visit.     Reduce Lantus insulin from 35 units --> 30 units.     Always a carry a quick acting carbohydrate with you, especially when you are more active.     Let me know if any blood sugar concerns.     Lucía Chávez RD, CDE  Diabetes

## 2020-01-16 ENCOUNTER — OFFICE VISIT (OUTPATIENT)
Dept: FAMILY MEDICINE | Facility: CLINIC | Age: 60
End: 2020-01-16
Payer: MEDICARE

## 2020-01-16 VITALS
WEIGHT: 240 LBS | BODY MASS INDEX: 38.57 KG/M2 | HEIGHT: 66 IN | OXYGEN SATURATION: 98 % | TEMPERATURE: 97.4 F | RESPIRATION RATE: 18 BRPM | SYSTOLIC BLOOD PRESSURE: 128 MMHG | DIASTOLIC BLOOD PRESSURE: 71 MMHG | HEART RATE: 61 BPM

## 2020-01-16 DIAGNOSIS — G47.33 OSA (OBSTRUCTIVE SLEEP APNEA): ICD-10-CM

## 2020-01-16 DIAGNOSIS — Z79.4 TYPE 2 DIABETES MELLITUS WITH DIABETIC NEUROPATHY, WITH LONG-TERM CURRENT USE OF INSULIN (H): ICD-10-CM

## 2020-01-16 DIAGNOSIS — Z12.5 SCREENING FOR PROSTATE CANCER: ICD-10-CM

## 2020-01-16 DIAGNOSIS — F33.1 MODERATE EPISODE OF RECURRENT MAJOR DEPRESSIVE DISORDER (H): ICD-10-CM

## 2020-01-16 DIAGNOSIS — M25.512 CHRONIC LEFT SHOULDER PAIN: ICD-10-CM

## 2020-01-16 DIAGNOSIS — I10 ESSENTIAL HYPERTENSION WITH GOAL BLOOD PRESSURE LESS THAN 140/90: ICD-10-CM

## 2020-01-16 DIAGNOSIS — Z00.00 ROUTINE HISTORY AND PHYSICAL EXAMINATION OF ADULT: Primary | ICD-10-CM

## 2020-01-16 DIAGNOSIS — G89.29 CHRONIC LEFT SHOULDER PAIN: ICD-10-CM

## 2020-01-16 DIAGNOSIS — R45.4 EXCESSIVE ANGER: ICD-10-CM

## 2020-01-16 DIAGNOSIS — E11.40 TYPE 2 DIABETES MELLITUS WITH DIABETIC NEUROPATHY, WITH LONG-TERM CURRENT USE OF INSULIN (H): ICD-10-CM

## 2020-01-16 DIAGNOSIS — N40.1 BENIGN PROSTATIC HYPERPLASIA WITH URINARY HESITANCY: ICD-10-CM

## 2020-01-16 DIAGNOSIS — R39.11 BENIGN PROSTATIC HYPERPLASIA WITH URINARY HESITANCY: ICD-10-CM

## 2020-01-16 LAB
ALBUMIN SERPL-MCNC: 3.7 G/DL (ref 3.4–5)
ALP SERPL-CCNC: 76 U/L (ref 40–150)
ALT SERPL W P-5'-P-CCNC: 34 U/L (ref 0–70)
ANION GAP SERPL CALCULATED.3IONS-SCNC: 6 MMOL/L (ref 3–14)
AST SERPL W P-5'-P-CCNC: 24 U/L (ref 0–45)
BILIRUB SERPL-MCNC: 0.4 MG/DL (ref 0.2–1.3)
BUN SERPL-MCNC: 13 MG/DL (ref 7–30)
CALCIUM SERPL-MCNC: 9 MG/DL (ref 8.5–10.1)
CHLORIDE SERPL-SCNC: 93 MMOL/L (ref 94–109)
CHOLEST SERPL-MCNC: 154 MG/DL
CO2 SERPL-SCNC: 26 MMOL/L (ref 20–32)
CREAT SERPL-MCNC: 1.01 MG/DL (ref 0.66–1.25)
ERYTHROCYTE [DISTWIDTH] IN BLOOD BY AUTOMATED COUNT: 13.2 % (ref 10–15)
GFR SERPL CREATININE-BSD FRML MDRD: 79 ML/MIN/{1.73_M2}
GLUCOSE SERPL-MCNC: 135 MG/DL (ref 70–99)
HCT VFR BLD AUTO: 34.8 % (ref 40–53)
HDLC SERPL-MCNC: 32 MG/DL
HGB BLD-MCNC: 11.9 G/DL (ref 13.3–17.7)
LDLC SERPL CALC-MCNC: 97 MG/DL
MCH RBC QN AUTO: 28.2 PG (ref 26.5–33)
MCHC RBC AUTO-ENTMCNC: 34.2 G/DL (ref 31.5–36.5)
MCV RBC AUTO: 83 FL (ref 78–100)
NONHDLC SERPL-MCNC: 122 MG/DL
PLATELET # BLD AUTO: 340 10E9/L (ref 150–450)
POTASSIUM SERPL-SCNC: 4.7 MMOL/L (ref 3.4–5.3)
PROT SERPL-MCNC: 7.9 G/DL (ref 6.8–8.8)
PSA SERPL-ACNC: <0.01 UG/L (ref 0–4)
RBC # BLD AUTO: 4.22 10E12/L (ref 4.4–5.9)
SODIUM SERPL-SCNC: 125 MMOL/L (ref 133–144)
TRIGL SERPL-MCNC: 126 MG/DL
WBC # BLD AUTO: 8.1 10E9/L (ref 4–11)

## 2020-01-16 PROCEDURE — 80053 COMPREHEN METABOLIC PANEL: CPT | Performed by: PHYSICIAN ASSISTANT

## 2020-01-16 PROCEDURE — G0103 PSA SCREENING: HCPCS | Performed by: PHYSICIAN ASSISTANT

## 2020-01-16 PROCEDURE — 80061 LIPID PANEL: CPT | Performed by: PHYSICIAN ASSISTANT

## 2020-01-16 PROCEDURE — 99207 C FOOT EXAM  NO CHARGE: CPT | Mod: 25 | Performed by: PHYSICIAN ASSISTANT

## 2020-01-16 PROCEDURE — 36415 COLL VENOUS BLD VENIPUNCTURE: CPT | Performed by: PHYSICIAN ASSISTANT

## 2020-01-16 PROCEDURE — G0439 PPPS, SUBSEQ VISIT: HCPCS | Performed by: PHYSICIAN ASSISTANT

## 2020-01-16 PROCEDURE — 85027 COMPLETE CBC AUTOMATED: CPT | Performed by: PHYSICIAN ASSISTANT

## 2020-01-16 RX ORDER — BUPROPION HYDROCHLORIDE 300 MG/1
300 TABLET ORAL EVERY MORNING
Qty: 90 TABLET | Refills: 1 | Status: SHIPPED | OUTPATIENT
Start: 2020-01-16 | End: 2020-09-02

## 2020-01-16 RX ORDER — DULAGLUTIDE 0.75 MG/.5ML
INJECTION, SOLUTION SUBCUTANEOUS
Refills: 1 | COMMUNITY
Start: 2019-08-30 | End: 2020-01-16

## 2020-01-16 RX ORDER — CITALOPRAM HYDROBROMIDE 40 MG/1
40 TABLET ORAL DAILY
Qty: 90 TABLET | Refills: 1 | Status: SHIPPED | OUTPATIENT
Start: 2020-01-16 | End: 2020-09-02

## 2020-01-16 RX ORDER — METFORMIN HCL 500 MG
TABLET, EXTENDED RELEASE 24 HR ORAL
Refills: 1 | COMMUNITY
Start: 2019-12-05 | End: 2021-02-25

## 2020-01-16 RX ORDER — FINASTERIDE 5 MG/1
1 TABLET, FILM COATED ORAL DAILY
Qty: 90 TABLET | Refills: 1 | Status: SHIPPED | OUTPATIENT
Start: 2020-01-16 | End: 2020-10-23

## 2020-01-16 RX ORDER — LEVOTHYROXINE SODIUM 125 UG/1
TABLET ORAL
Refills: 11 | COMMUNITY
Start: 2019-12-02 | End: 2020-01-16 | Stop reason: DRUGHIGH

## 2020-01-16 RX ORDER — ATENOLOL 25 MG/1
25 TABLET ORAL DAILY
Qty: 90 TABLET | Refills: 0 | Status: SHIPPED | OUTPATIENT
Start: 2020-01-16 | End: 2021-05-13

## 2020-01-16 RX ORDER — CYCLOBENZAPRINE HCL 10 MG
10 TABLET ORAL 3 TIMES DAILY PRN
Qty: 30 TABLET | Refills: 1 | Status: SHIPPED | OUTPATIENT
Start: 2020-01-16 | End: 2020-05-20

## 2020-01-16 ASSESSMENT — ENCOUNTER SYMPTOMS
NERVOUS/ANXIOUS: 1
DYSURIA: 0
MYALGIAS: 0
EYE PAIN: 0
ARTHRALGIAS: 0
PARESTHESIAS: 0
FEVER: 0
ABDOMINAL PAIN: 0
CONSTIPATION: 0
HEMATOCHEZIA: 0
HEMATURIA: 0
DIARRHEA: 1
JOINT SWELLING: 0
WEAKNESS: 0
SORE THROAT: 0
NAUSEA: 0
FREQUENCY: 0
HEARTBURN: 0
DIZZINESS: 0
CHILLS: 0
PALPITATIONS: 0
COUGH: 0
SHORTNESS OF BREATH: 0
HEADACHES: 1

## 2020-01-16 ASSESSMENT — MIFFLIN-ST. JEOR: SCORE: 1836.38

## 2020-01-16 ASSESSMENT — ACTIVITIES OF DAILY LIVING (ADL): CURRENT_FUNCTION: NO ASSISTANCE NEEDED

## 2020-01-16 NOTE — PATIENT INSTRUCTIONS
Check on the Shingles Vaccine--covered in the clinic or pharmacy?    Try to decrease pop intake, it's a bladder irritant.  Try to strengthen your pelvic floor muscles.  Find them by squeezing and stopping your flow of urine.  Once you do this, you can do the exercises 2-3 times per day.       Patient Education   Personalized Prevention Plan  You are due for the preventive services outlined below.  Your care team is available to assist you in scheduling these services.  If you have already completed any of these items, please share that information with your care team to update in your medical record.  Health Maintenance Due   Topic Date Due     ANNUAL REVIEW OF HM ORDERS  1960     HIV Screening  02/28/1973     Zoster (Shingles) Vaccine (1 of 2) 02/28/2008     Discuss Advance Care Planning  05/07/2018     Annual Wellness Visit  12/12/2018     Kidney Microalbumin Urine Test  12/14/2019     Diabetic Foot Exam  12/14/2019

## 2020-01-16 NOTE — PROGRESS NOTES
"SUBJECTIVE:   Steve Morgan is a 59 year old male who presents for Preventive Visit.    Are you in the first 12 months of your Medicare coverage?  No    Healthy Habits:     In general, how would you rate your overall health?  Fair    Frequency of exercise:  None    Do you usually eat at least 4 servings of fruit and vegetables a day, include whole grains    & fiber and avoid regularly eating high fat or \"junk\" foods?  No    Taking medications regularly:  No    Barriers to taking medications:  Other    Medication side effects:  None    Ability to successfully perform activities of daily living:  No assistance needed    Home Safety:  Throw rugs in the hallway and lack of grab bars in the bathroom    Hearing Impairment:  No hearing concerns    In the past 6 months, have you been bothered by leaking of urine? Yes    In general, how would you rate your overall mental or emotional health?  Fair      PHQ-2 Total Score: 2    Do you feel safe in your environment? Yes    Have you ever done Advance Care Planning? (For example, a Health Directive, POLST, or a discussion with a medical provider or your loved ones about your wishes): No, advance care planning information given to patient to review.  Patient plans to discuss their wishes with loved ones or provider.      Fall risk       Cognitive Screening   1) Repeat 3 items (Leader, Season, Table)    2) Clock draw: NORMAL  3) 3 item recall: Recalls 2 objects   Results: NORMAL clock, 1-2 items recalled: COGNITIVE IMPAIRMENT LESS LIKELY    Mini-CogTM Copyright DAINA Finley. Licensed by the author for use in [facility]; reprinted with permission (luz marina@.[email or web address]). All rights reserved.      Do you have sleep apnea, excessive snoring or daytime drowsiness?: YES    Reviewed and updated as needed this visit by clinical staff  Allergies         Reviewed and updated as needed this visit by Provider        Social History     Tobacco Use     Smoking status: Never Smoker     " Smokeless tobacco: Never Used   Substance Use Topics     Alcohol use: Yes     Frequency: Monthly or less     Drinks per session: 1 or 2     Binge frequency: Never     Comment: Occassionally     If you drink alcohol do you typically have >3 drinks per day or >7 drinks per week? No    Alcohol Use 1/15/2020   Prescreen: >3 drinks/day or >7 drinks/week? Not Applicable   Prescreen: >3 drinks/day or >7 drinks/week? -           Pt as concerns about comprehending things in reads, never follow-up with NeuroPsych    Current providers sharing in care for this patient include:   Patient Care Team:  Lisa Pierre PA-C as PCP - General (Physician Assistant)  Lucía Chávez RD as Diabetes Educator (Dietitian, Registered)  Lisa Pierre PA-C as Assigned PCP    The following health maintenance items are reviewed in Epic and correct as of today:  Health Maintenance   Topic Date Due     ANNUAL REVIEW OF HM ORDERS  1960     HIV SCREENING  02/28/1973     ZOSTER IMMUNIZATION (1 of 2) 02/28/2008     ADVANCE CARE PLANNING  05/07/2018     MEDICARE ANNUAL WELLNESS VISIT  12/12/2018     MICROALBUMIN  12/14/2019     DIABETIC FOOT EXAM  12/14/2019     A1C  03/05/2020     EYE EXAM  03/15/2020     PHQ-9  04/24/2020     ALT  08/29/2020     LIPID  08/29/2020     BMP  10/24/2020     CREATININE  10/24/2020     DTAP/TDAP/TD IMMUNIZATION (3 - Td) 11/08/2020     TSH W/FREE T4 REFLEX  12/05/2020     COLONOSCOPY  04/06/2021     HEPATITIS C SCREENING  Completed     DEPRESSION ACTION PLAN  Completed     INFLUENZA VACCINE  Completed     PNEUMOCOCCAL IMMUNIZATION 19-64 MEDIUM RISK  Completed     IPV IMMUNIZATION  Aged Out     MENINGITIS IMMUNIZATION  Aged Out     Lab work is in process  Pneumonia Vaccine:Adults age 65+ who received Pneumovax (PPSV23) at 65 years or older: Should be given PCV13 > 1 year after their most recent PPSV23    Review of Systems   Constitutional: Negative for chills and fever.   HENT: Negative for congestion, ear  "pain, hearing loss and sore throat.    Eyes: Negative for pain and visual disturbance.   Respiratory: Negative for cough and shortness of breath.    Cardiovascular: Negative for chest pain, palpitations and peripheral edema.   Gastrointestinal: Positive for diarrhea. Negative for abdominal pain, constipation, heartburn, hematochezia and nausea.   Genitourinary: Negative for discharge, dysuria, frequency, genital sores, hematuria, impotence and urgency.   Musculoskeletal: Negative for arthralgias, joint swelling and myalgias.   Skin: Negative for rash.   Neurological: Positive for headaches. Negative for dizziness, weakness and paresthesias.   Psychiatric/Behavioral: Negative for mood changes. The patient is nervous/anxious.          OBJECTIVE:   /71   Pulse 61   Temp 97.4  F (36.3  C) (Oral)   Resp 18   Ht 1.676 m (5' 6\")   Wt 108.9 kg (240 lb)   SpO2 98%   BMI 38.74 kg/m   Estimated body mass index is 38.81 kg/m  as calculated from the following:    Height as of 10/24/19: 1.68 m (5' 6.14\").    Weight as of 1/15/20: 109.5 kg (241 lb 8 oz).  Physical Exam  GENERAL: healthy, alert and no distress  EYES: Eyes grossly normal to inspection, PERRL and conjunctivae and sclerae normal  HENT: ear canals and TM's normal, nose and mouth without ulcers or lesions  NECK: no adenopathy, no asymmetry, masses, or scars and thyroid normal to palpation  RESP: lungs clear to auscultation - no rales, rhonchi or wheezes  CV: regular rate and rhythm, normal S1 S2, no S3 or S4, no murmur, click or rub, no peripheral edema and peripheral pulses strong  ABDOMEN: soft, nontender, no hepatosplenomegaly, no masses and bowel sounds normal  SKIN: xerosis - hands, legs, feet  NEURO: Normal strength and tone, mentation intact and speech normal  PSYCH: mentation appears normal, affect normal/bright  Diabetic foot exam: reduced sensation at to mid leg        ASSESSMENT / PLAN:   1. Routine history and physical examination of adult    - " "CBC with platelets  - Comprehensive metabolic panel  - Lipid panel reflex to direct LDL Fasting    2. Type 2 diabetes mellitus with diabetic neuropathy, with long-term current use of insulin (H)  Follow-up with Endo   - FOOT EXAM    3. Moderate episode of recurrent major depressive disorder (H)  Will increase citalopram to 40 mg daily. OV, EV 6 months.   - buPROPion (WELLBUTRIN XL) 300 MG 24 hr tablet; Take 1 tablet (300 mg) by mouth every morning  Dispense: 90 tablet; Refill: 1  - citalopram (CELEXA) 40 MG tablet; Take 1 tablet (40 mg) by mouth daily  Dispense: 90 tablet; Refill: 1    4. DAMIÁN (obstructive sleep apnea)    - SLEEP EVALUATION & MANAGEMENT REFERRAL - [facility]  [phone] (Age 18 and up); Future    5. Excessive anger    - buPROPion (WELLBUTRIN XL) 300 MG 24 hr tablet; Take 1 tablet (300 mg) by mouth every morning  Dispense: 90 tablet; Refill: 1    6. Essential hypertension with goal blood pressure less than 140/90  Stable.   - atenolol (TENORMIN) 25 MG tablet; Take 1 tablet (25 mg) by mouth daily  Dispense: 90 tablet; Refill: 0    7. Chronic left shoulder pain    - cyclobenzaprine (FLEXERIL) 10 MG tablet; Take 1 tablet (10 mg) by mouth 3 times daily as needed for muscle spasms  Dispense: 30 tablet; Refill: 1    8. Benign prostatic hyperplasia with urinary hesitancy  See pt's notes.   - finasteride (PROSCAR) 5 MG tablet; Take 1 tablet (5 mg) by mouth daily  Dispense: 90 tablet; Refill: 1    9. Screening for prostate cancer    - PSA, screen    COUNSELING:  Reviewed preventive health counseling, as reflected in patient instructions       Regular exercise       Healthy diet/nutrition       Vision screening       Hearing screening       Dental care       Bladder control       Fall risk prevention       Prostate cancer screening    Estimated body mass index is 38.81 kg/m  as calculated from the following:    Height as of 10/24/19: 1.68 m (5' 6.14\").    Weight as of 1/15/20: " 109.5 kg (241 lb 8 oz).    Weight management plan: Discussed healthy diet and exercise guidelines     reports that he has never smoked. He has never used smokeless tobacco.      Appropriate preventive services were discussed with this patient, including applicable screening as appropriate for cardiovascular disease, diabetes, osteopenia/osteoporosis, and glaucoma.  As appropriate for age/gender, discussed screening for colorectal cancer, prostate cancer, breast cancer, and cervical cancer. Checklist reviewing preventive services available has been given to the patient.    Reviewed patients plan of care and provided an AVS. The Intermediate Care Plan ( asthma action plan, low back pain action plan, and migraine action plan) for Steve meets the Care Plan requirement. This Care Plan has been established and reviewed with the Patient.    Counseling Resources:  ATP IV Guidelines  Pooled Cohorts Equation Calculator  Breast Cancer Risk Calculator  FRAX Risk Assessment  ICSI Preventive Guidelines  Dietary Guidelines for Americans, 2010  USDA's MyPlate  ASA Prophylaxis  Lung CA Screening    Lisa Pierre PA-C  [facility]    Identified Health Risks:

## 2020-01-17 DIAGNOSIS — E87.1 HYPONATREMIA: Primary | ICD-10-CM

## 2020-01-20 DIAGNOSIS — Z79.4 TYPE 2 DIABETES MELLITUS WITH DIABETIC NEUROPATHY, WITH LONG-TERM CURRENT USE OF INSULIN (H): ICD-10-CM

## 2020-01-20 DIAGNOSIS — E11.40 TYPE 2 DIABETES MELLITUS WITH DIABETIC NEUROPATHY, WITH LONG-TERM CURRENT USE OF INSULIN (H): ICD-10-CM

## 2020-01-20 NOTE — TELEPHONE ENCOUNTER
Routing refill request to provider for review/approval because:  Drug not on the [facility] refill protocol   Manuela Cancino RN

## 2020-01-21 RX ORDER — FLASH GLUCOSE SENSOR
KIT MISCELLANEOUS
Qty: 2 EACH | Refills: 11 | Status: SHIPPED | OUTPATIENT
Start: 2020-01-21 | End: 2021-05-13

## 2020-02-03 PROBLEM — R29.898 DECREASED GRIP STRENGTH: Status: RESOLVED | Noted: 2019-12-12 | Resolved: 2020-02-03

## 2020-02-03 NOTE — PROGRESS NOTES
Discharge Summary - Hand Therapy    Patient did not return to therapy.  Assume all goals were met to patient's satisfaction. D/C from hand therapy.

## 2020-02-11 ENCOUNTER — OFFICE VISIT (OUTPATIENT)
Dept: PODIATRY | Facility: CLINIC | Age: 60
End: 2020-02-11
Payer: MEDICARE

## 2020-02-11 VITALS
HEIGHT: 66 IN | DIASTOLIC BLOOD PRESSURE: 80 MMHG | SYSTOLIC BLOOD PRESSURE: 138 MMHG | WEIGHT: 242 LBS | BODY MASS INDEX: 38.89 KG/M2

## 2020-02-11 DIAGNOSIS — L84 PRE-ULCERATIVE CALLUSES: ICD-10-CM

## 2020-02-11 DIAGNOSIS — E11.40 TYPE 2 DIABETES MELLITUS WITH DIABETIC NEUROPATHY, UNSPECIFIED WHETHER LONG TERM INSULIN USE (H): Primary | ICD-10-CM

## 2020-02-11 DIAGNOSIS — M79.2 NEUROPATHIC PAIN OF LEFT FOOT: ICD-10-CM

## 2020-02-11 DIAGNOSIS — M79.2 NEUROPATHIC PAIN OF RIGHT FOOT: ICD-10-CM

## 2020-02-11 PROCEDURE — 99213 OFFICE O/P EST LOW 20 MIN: CPT | Performed by: PODIATRIST

## 2020-02-11 ASSESSMENT — MIFFLIN-ST. JEOR: SCORE: 1845.45

## 2020-02-11 NOTE — PATIENT INSTRUCTIONS
"Thank you for choosing [facility] Podiatry / Foot & Ankle Surgery!    DR. AKBAR'S CLINIC SCHEDULE  MONDAY AM - [place] TUESDAY - [place]   [address] [address] [place] [address] [place] [address]   [phone] / [phone] / [phone]       WEDNESDAY - [place] FRIDAY AM - [facility]   [address]   [place] [address] [place] [address]   [phone] / [phone]       FRIDAY PM - [place] SCHEDULE SURGERY: [phone] [address] BILLING QUESTIONS: [phone]   [place] [address] AFTER HOURS: [phone]   [phone] / [phone] APPOINTMENTS: [phone]     Consumer Price Line (CPL) [phone]     FOLLOW UP:  As needed        DIABETES AND YOUR FEET  Diabetes can result in several problems in the feet including ulcers (open sores) and amputations. Two of the most important reasons why people develop foot problems when they have diabetes is : 1. Neuropathy (loss of feeling)  2. Vascular disease (loss or decrease of blood flow).    Neuropathy is a term used to describe a loss of nerve function.  Patients with diabetes are at risk of developing neuropathy if their sugars continue to run high and are above the normal value. One theory for neuropathy is that the \"extra\" sugar in the body enters the nerves and is broken down. These by-products build up in the nerve causing it to swell and impairing nerve function. Often times, this can be prevented by controlling your sugars, dieting and exercise.    When a person develops neuropathy, they usually begin to feel numbness or tingling in their feet and sometime in their legs.  Other symptoms may include painful burning or hot feet, tingling or feeling like insects or ants are crawling on your feet or legs.  If the diabetes is sever and the sugars run high for long periods of time, neuropathy can also occur in the hands.    Vascular " disease  is a term used to describe a loss or decrease in circulation (blood flow). There is a problem in getting blood and oxygen to areas that need it. Similar to neuropathy, sugars can build up in the walls of the arteries (blood vessels) and cause them to become swollen, thickened and hardened. This decreases the amount of blood that can go to an area that needs it. Though this is common in the legs of diabetic patients, it can also affect other arteries (blood vessels) in the body such as in the heart and eyes.    In the legs, vascular disease usually results in cramping. Patients who develop leg cramps after walking the same distance every time (i.e. One block, half a mile, ect.) need to let their doctors know so that their circulation may be checked. Cramps causing severe pain in the feet and/or legs while sleeping and the cramps go away when you stand or hang your legs off the side of the bed, may also be a sign of poor blood circulation.  Occasional cramping in cold weather or on rare occasions with activity may not be due to poor circulation, but you should inform your doctor.    PREVENTION OF THESE DISEASES  The key to prevention is good blood sugar control. Poor blood sugar control is a big reason many of these problems start. Physical activity (exercise) is a very good way to help decrease your blood sugars. Exercise can lower your blood sugar, blood pressure, and cholesterol. It also reduces your risk for heart disease and stroke, relieves stress, and strengthens your heart, muscles and bones.  In addition, regular activity helps insulin work better, improves your blood circulation, and keeps your joints flexible. If you're trying to lose weight, a combination of exercise and wise food choices can help you reach your target weight and maintain it.      PAIN MANAGEMENT  1.Blood Sugar Control - Most important  2. Medications such as:  Amytriptylline, duloxetine, gabapentin, lyrica, tramadol  3.  Nutritional therapy:  Vitamin B6 (100mg daily), Vitamin B12 (75mcg daily), Vitamin D 2000 IU daily), Alpha-Lipoic Acid (600-1800mg daily), Acetyl-L-Carnitine (500-1000mg TID, L-methyl folate (1500mcg daily)    ** Metformin can block Vitamin B6 and B12 so it is important to supplement**    FOOT CARE RECOMMENDATIONS   1. Wash your feet with lukewarm water and a mild soap and then dry them thoroughly, especially between the toes.     2. Examine your feet daily looking for cuts, corns, blisters, cracks, ect, especially after wearing new shoes. Make sure to look between your toes. If you cannot see the bottom of your feet, set a mirror on the floor and hold your foot over it, or ask a spouse, friend or family member to examine your feet for you. Contact your doctor immediately if new problems are noted or if sores are not healing.     3. Immediately apply moisturizer to the tops and bottoms of your feet, avoiding areas between the toes. Hand lotion (Intesive Care, Karla, Eucerin, Neutrogena, Curel, ect) is sufficient unless your doctor prescribes a medicated lotion. Apply sunscreen to your feet when going swimming outside.     4. Use clean comfortable shoes, wear white socks (if you have any bleeding or drainage, you will see it on white socks). Socks should not have thick seams or cut off the circulation around the leg. Break in new shoes slowly and rotate with older shoes until broken in. Check the inside of your shoes with your hand to look for areas of irritation or objects that may have fallen into your shoes.       5. Keep slippers by the side of your bed for use during the night.     6.  Shoes should be fitted by a professional and should not cause areas of irritation.  Check your feet regularly when wearing a new pair of shoes and replace them as needed.     7.  Talk to your doctor about proper exercise. Exercise and stretching stimulate blood flow to your feet and maintain proper glucose levels.     8.  Monitor  your blood glucose level as instructed by your doctor. Notify your doctor immediately if your blood sugar is abnormally high or low.    9. Cut your nails straight across, but then gently round any sharp edges with a cardboard nail file. If you have neuropathy, peripheral vascular disease or cannot see that well to trim your own toenails contact [facility] ([phone]) or [facility] [employer] ([phone]).      THINGS TO AVOID DOING   1.  Do not soak your feet if you have an open sore. Use only lukewarm water and always check the temperature with your hand as hot water can easily burn your feet.       2.  Never use a hot water bottle or heating pad on your feet. Also do not apply cold compresses to your feet. With decreased sensation, you could burn or freeze your feet.       3.  Do not apply any of these to your feet:    -  Over the counter medicine for corns or warts    -  Harsh chemicals like boric acid    -  Do not self-treat corns, cuts, blisters or infections. Always consult your doctor.       4.  Do not wear sandals, slippers or walk barefoot, especially on hot sand or concrete or other harsh surfaces.     5.  If you smoke, stop!!!            CALLUS / CORNS / IPKs  When there is excessive friction or pressure on the skin, the body responds by making the skin thicker to protect the deeper structures from becoming exposed. While this works well to protect the deeper structures, the thickened skin can increase pressure and pain.    CALLUS: Flat, diffuse thickening are simple calluses and they are usually caused by friction. Often these are the result of rubbing on a shoe or going barefoot.    CORNS: Calluses with a central core between the toes are called corns. These result from prominent joints on adjacent toes rubbing together. Theses are a symptom of bone malalignment and will always recur unless the underlying bones are addressed surgically.    IPKs: Calluses with a central core on the ball of the foot are  usually IPKs (intractable plantar keratosis). These are caused by excessive pressure from the metatarsals, the bones that make up the ball of the foot. Often one of these bones is too long or too prominent.  Again, these will always recur unless the underlying bone issue is addressed. There is no cure for these. They will either go away by themselves, recur, or more could develop.    ROUTINE MAINTENANCE  1. File them down with a pumice stone or callus file a couple times a week.   2. An electric callus removing device. Amope Pedi Perfect Electronic Pedicure Foot File and Callus Remover can be a good option.   3. Lotion can be applied to soften the callus. A urea based cream such as Kersal or Vanicream or thicker cream with shea butter are good options.  4. Toe spacers or toe covers can be used for corns, gel pads can be used for other lesions on the bottom of the foot.   If there is a surgical pathology noted, such as a prominent bone, often this needs to be addressed surgically to minimize recurrence. However, sometimes the lesion simply migrates to another spot after surgery, so it is not a guaranteed cure.     There was also discussion of the cost structure of callus care if they were to come back and have it treated in the clinic. Explained that if insurance does not cover it, they would be billed. This charge could range from $100 - $160.  They were also provided information on places to get the callus treatment.                BODY WEIGHT AND YOUR FEET  The following information is included in the after visit summary for all patients. Body weight can be a sensitive issue to discuss in clinic, but we think the following information is very important. Although we focus on the feet and ankles, we do support the overall health of our patients.     Many things can cause foot and ankle problems. Foot structure, activity level, foot mechanics and injuries are common causes of pain. One very important issue that often  goes unmentioned, is body weight. Extra weight can cause increased stress on muscles, ligaments, bones and tendons. Sometimes just a few extra pounds is all it takes to put one over her/his threshold. Without reducing that stress, it can be difficult to alleviate pain. As Foot & Ankle specialists, our job is addressing the lower extremity problem and possible causes. Regarding extra body weight, we encourage patients to discuss diet and weight management plans with their primary care doctors. It is this team approach that gives you the best opportunity for pain relief and getting you back on your feet.      [facility] has a Comprehensive Weight Management Program. This program includes counseling, education, non-surgical and surgical approaches to weight loss. If you are interested in learning more either talk to you primary care provider or call [phone].

## 2020-02-11 NOTE — PROGRESS NOTES
PATIENT HISTORY:  Steve Morgan is a 59 year old male who presents to clinic for assess ment of left foot. Notes that the callus is getting thick again. minimally painful 2/10 but he does have neuropathy. Does walk around the house in socks, not shoes. Denies injury. Denies fever chills. Also has noted more intermittent pain to the feet. States it is random, mostly at night. Patient notes his blood sugars have been really high.     Review of Systems:  Patient denies fever, chills, rash, wound, stiffness, limping,  weakness, heart burn, blood in stool, chest pain with activity, calf pain when walking, shortness of breath with activity, chronic cough, easy bleeding/bruising, swelling of ankles, excessive thirst, fatigue, depression, anxiety.  Patient admits to numbness.     PAST MEDICAL HISTORY:   Past Medical History:   Diagnosis Date     Cellulitis and abscess of trunk 6/27/2017     Depression      Hyperlipidemia LDL goal <100 10/31/2010     Hypertension goal BP (blood pressure) < 140/90 3/17/2011     Hypothyroidism 1/12/2010     Morbid obesity due to excess calories (H) 1/12/2010     Neuropathy in diabetes (H) 1/12/2010     Obesity 1/12/2010     Sleep apnea 1/12/2010    CPAP     Type 2 diabetes, HbA1C goal < 8% (H) 3/8/2011        PAST SURGICAL HISTORY:   Past Surgical History:   Procedure Laterality Date     BIOPSY       COLONOSCOPY       EYE SURGERY       GENITOURINARY SURGERY      surg for undescended testicle     REPAIR HAMMER TOE BILATERAL  5/16/2013    Procedure: REPAIR HAMMER TOE BILATERAL;  Flexor Tenotomy Toes 2,3,4,5 Bilateral Feet;  Surgeon: Saad Bangura DPM;  Location:  OR        MEDICATIONS:   Current Outpatient Medications:      albuterol (PROAIR HFA/PROVENTIL HFA/VENTOLIN HFA) 108 (90 Base) MCG/ACT inhaler, Inhale 2 puffs into the lungs every 6 hours as needed for wheezing, Disp: 6.7 g, Rfl: 0     ammonium lactate (AMLACTIN) 12 % external cream, Apply topically daily Apply to feet daily,  Disp: 385 g, Rfl: 2     ammonium lactate (LAC-HYDRIN) 12 % cream, Apply topically 2 times daily as needed for dry skin, Disp: 385 g, Rfl: 3     ASPIRIN 81 MG OR TABS, ONE DAILY, Disp: 100, Rfl: 3     atenolol (TENORMIN) 25 MG tablet, Take 1 tablet (25 mg) by mouth daily, Disp: 90 tablet, Rfl: 0     blood glucose (ONE TOUCH VERIO IQ) test strip, Use to test blood sugars 2 times daily or as directed., Disp: 100 strip, Rfl: 0     blood glucose monitoring (KERLINE MICROLET) lancets, Use to test blood sugar 3 times daily or as directed., Disp: 100 each, Rfl: 11     buPROPion (WELLBUTRIN XL) 300 MG 24 hr tablet, Take 1 tablet (300 mg) by mouth every morning, Disp: 90 tablet, Rfl: 1     Calcium Carb-Cholecalciferol (CALCIUM 600 + D PO), Take 1 tablet by mouth daily, Disp: , Rfl:      citalopram (CELEXA) 40 MG tablet, Take 1 tablet (40 mg) by mouth daily, Disp: 90 tablet, Rfl: 1     Continuous Blood Gluc  (FREEST[employer] GIULIANA 14 DAY READER) SIENA, 1 each continuous, Disp: 1 Device, Rfl: 0     Continuous Blood Gluc Sensor (FREESTYLE GIULIANA 14 DAY SENSOR) MISC, USE 1 SENSOR EVERY 14 DAYS, Disp: 2 each, Rfl: 11     Cyanocobalamin (VITAMIN B 12 PO), Take 250 mcg by mouth daily, Disp: , Rfl:      cyclobenzaprine (FLEXERIL) 10 MG tablet, Take 1 tablet (10 mg) by mouth 3 times daily as needed for muscle spasms, Disp: 30 tablet, Rfl: 1     ferrous sulfate 325 (65 FE) MG tablet, Take 1 tablet by mouth daily (with breakfast)., Disp: , Rfl:      finasteride (PROSCAR) 5 MG tablet, Take 1 tablet (5 mg) by mouth daily, Disp: 90 tablet, Rfl: 1     hydrocortisone (WESTCORT) 0.2 % cream, Apply topically 2 times daily as needed Apply sparingly to affected area, BID., Disp: 45 g, Rfl: 1     insulin glargine (LANTUS SOLOSTAR) 100 UNIT/ML pen, 30 units qd.  Increase as directed to max dose of 45 units qd., Disp: 45 mL, Rfl: 1     insulin pen needle (B-D U/F) 31G X 5 MM miscellaneous, USE 4 DAILY OR AS DIRECTED., Disp: 400 each, Rfl: 3      LEVOXYL 137 MCG tablet, Take 1 tablet (137 mcg) by mouth daily Dispense name brand Levoxyl only, no generics, Disp: 90 tablet, Rfl: 1     losartan (COZAAR) 50 MG tablet, Take 1 tablet (50 mg) by mouth daily, Disp: 90 tablet, Rfl: 3     metFORMIN (GLUCOPHAGE-XR) 500 MG 24 hr tablet, TAKE 2 TABLETS (1,000 MG) BY MOUTH DAILY (WITH DINNER), Disp: , Rfl: 1     MULTI-VITAMIN OR TABS, 1 TABLET DAILY, Disp: 30, Rfl: 0     mupirocin (BACTROBAN) 2 % external ointment, Apply topically 3 times daily, Disp: 22 g, Rfl: 0     NOVOLOG FLEXPEN 100 UNIT/ML soln, Take 5 units before each meal. Increase as directed up to 45 units per day., Disp: 45 mL, Rfl: 3     omeprazole-sodium bicarbonate (ZEGERID)  MG per capsule, Take 1 capsule by mouth every morning (before breakfast), Disp: , Rfl:      simvastatin (ZOCOR) 20 MG tablet, Take 1 tablet (20 mg) by mouth At Bedtime, Disp: 90 tablet, Rfl: 3     VITAMIN D, CHOLECALCIFEROL, PO, Take 1,000 Units by mouth daily., Disp: , Rfl:      blood glucose monitoring (KERLINE CONTOUR NEXT) test strip, Use to test blood sugar 3 times daily or as directed. (Patient not taking: Reported on 12/5/2019), Disp: 100 strip, Rfl: 11     order for DME, Equipment being ordered: Please dispense up to 3 pairs of knee high compression stockings at 20-30 mmHg and one donning device if needed. (Patient not taking: Reported on 12/5/2019), Disp: 4 Device, Rfl: 0     order for DME, Equipment being ordered: Lt wrist splint (Patient not taking: Reported on 12/5/2019), Disp: 1 Device, Rfl: 0     ALLERGIES:  No Known Allergies     SOCIAL HISTORY:   Social History     Socioeconomic History     Marital status:      Spouse name: Sri     Number of children: 2     Years of education: Not on file     Highest education level: Associate degree: occupational, technical, or vocational program   Occupational History     Occupation:      Employer: NONE      Comment: [employer]   Social Needs     Financial  "resource strain: Not hard at all     Food insecurity:     Worry: Never true     Inability: Never true     Transportation needs:     Medical: No     Non-medical: No   Tobacco Use     Smoking status: Never Smoker     Smokeless tobacco: Never Used   Substance and Sexual Activity     Alcohol use: Yes     Frequency: Monthly or less     Drinks per session: 1 or 2     Binge frequency: Never     Comment: Occassionally     Drug use: No     Sexual activity: Not Currently     Partners: Female     Birth control/protection: Abstinence   Lifestyle     Physical activity:     Days per week: 0 days     Minutes per session: 0 min     Stress: Not at all   Relationships     Social connections:     Talks on phone: Twice a week     Gets together: Not on file     Attends Mosque service: Never     Active member of club or organization: Yes     Attends meetings of clubs or organizations: 1 to 4 times per year     Relationship status:      Intimate partner violence:     Fear of current or ex partner: Not on file     Emotionally abused: Not on file     Physically abused: Not on file     Forced sexual activity: Not on file   Other Topics Concern     Parent/sibling w/ CABG, MI or angioplasty before 65F 55M? No   Social History Narrative     Not on file        FAMILY HISTORY:   Family History   Problem Relation Age of Onset     Breast Cancer Mother      Hypertension Mother      Thyroid Disease Mother      Depression Mother      Alzheimer Disease Mother 82     Cancer - colorectal Father      Thyroid Disease Father      Depression Father      Other - See Comments Father         bladder polyps     Heart Disease Maternal Grandmother         CHF     Circulatory Paternal Grandmother      Cancer Paternal Grandfather      Diabetes Brother      Diabetes Brother      Asthma Brother         EXAM:Vitals: /80   Ht 1.676 m (5' 6\")   Wt 109.8 kg (242 lb)   BMI 39.06 kg/m        A1C: 11.7 (12/2019)    General appearance: Patient is alert and " fully cooperative with history & exam.  No sign of distress is noted during the visit.     Psychiatric: Affect is pleasant & appropriate.  Patient appears motivated to improve health.     Respiratory: Breathing is regular & unlabored while sitting.     HEENT: Hearing is intact to spoken word.  Speech is clear.  No gross evidence of visual impairment that would impact ambulation.     Dermatologic: pre ulcerative hyperkeratotic lesion to plantar medial left 1st metatarsal phalangeal joint. No open lesions or signs of infection.      Vascular: DP & PT pulses are intact & regular bilaterally.  No significant edema or varicosities noted.  CFT and skin temperature is normal to both lower extremities.     Neurologic: Lower extremity sensation is diminished to both feet.      Musculoskeletal: Patient is ambulatory without assistive device or brace.  Decrease arch height. Minimal pain on palpation to the sinus tarsi left ankle. Muscle strength is 5/5 without pain to both feet.     ASSESSMENT:     Type 2 diabetes mellitus with diabetic neuropathy, unspecified whether long term insulin use (H)  Pre-ulcerative calluses  Neuropathic pain of right foot  Neuropathic pain of left foot     PLAN:  Reviewed patient's chart in epic. Talked about diabetes and her feet. Discussed causes of keratomas.  They are due to areas of increase friction.  Hammertoes can create these as they put more pressure to the metatarsal head.  Discussed treatments such as using foot file, pumice stone, metatarsal pads, orthotics, and not walking barefoot.      We discussed the cost structure of callus care if they were to come back and have it treated in the clinic if insurance does not cover it and explained that they would be billed. They were also provided information on places to get the callus treatment.     Discussed these could lead to ulcers. Recommend lotion feet daily and using pumice stone. talked about a carbon fiber plate to put in the shoe. He  is going to think about it.     Discussed pain to feet likely neuropathic. Recommend he follow up with primary care doctor/endocrinologist for blood sugars. Also recommend over the counter topical pain cream.        Tena Mathis DPM, Podiatry/Foot and Ankle Surgery    Weight management plan: Patient was referred to their PCP to discuss a diet and exercise plan.    Recommended to Steve Morgan to follow up with Primary Care provider regarding elevated blood pressure.

## 2020-02-11 NOTE — LETTER
2/11/2020         RE: Steve Morgan  [id number]  [place] [facility] [id number]        Dear Colleague,    Thank you for referring your patient, Steve Morgan, to the [facility]. Please see a copy of my visit note below.    PATIENT HISTORY:  Steve Morgan is a 59 year old male who presents to clinic for assess ment of left foot. Notes that the callus is getting thick again. minimally painful 2/10 but he does have neuropathy. Does walk around the house in socks, not shoes. Denies injury. Denies fever chills. Also has noted more intermittent pain to the feet. States it is random, mostly at night. Patient notes his blood sugars have been really high.     Review of Systems:  Patient denies fever, chills, rash, wound, stiffness, limping,  weakness, heart burn, blood in stool, chest pain with activity, calf pain when walking, shortness of breath with activity, chronic cough, easy bleeding/bruising, swelling of ankles, excessive thirst, fatigue, depression, anxiety.  Patient admits to numbness.     PAST MEDICAL HISTORY:   Past Medical History:   Diagnosis Date     Cellulitis and abscess of trunk 6/27/2017     Depression      Hyperlipidemia LDL goal <100 10/31/2010     Hypertension goal BP (blood pressure) < 140/90 3/17/2011     Hypothyroidism 1/12/2010     Morbid obesity due to excess calories (H) 1/12/2010     Neuropathy in diabetes (H) 1/12/2010     Obesity 1/12/2010     Sleep apnea 1/12/2010    CPAP     Type 2 diabetes, HbA1C goal < 8% (H) 3/8/2011        PAST SURGICAL HISTORY:   Past Surgical History:   Procedure Laterality Date     BIOPSY       COLONOSCOPY       EYE SURGERY       GENITOURINARY SURGERY      surg for undescended testicle     REPAIR HAMMER TOE BILATERAL  5/16/2013    Procedure: REPAIR HAMMER TOE BILATERAL;  Flexor Tenotomy Toes 2,3,4,5 Bilateral Feet;  Surgeon: Saad Bangura DPM;  Location:  OR        MEDICATIONS:   Current Outpatient Medications:      albuterol (PROAIR  HFA/PROVENTIL HFA/VENTOLIN HFA) 108 (90 Base) MCG/ACT inhaler, Inhale 2 puffs into the lungs every 6 hours as needed for wheezing, Disp: 6.7 g, Rfl: 0     ammonium lactate (AMLACTIN) 12 % external cream, Apply topically daily Apply to feet daily, Disp: 385 g, Rfl: 2     ammonium lactate (LAC-HYDRIN) 12 % cream, Apply topically 2 times daily as needed for dry skin, Disp: 385 g, Rfl: 3     ASPIRIN 81 MG OR TABS, ONE DAILY, Disp: 100, Rfl: 3     atenolol (TENORMIN) 25 MG tablet, Take 1 tablet (25 mg) by mouth daily, Disp: 90 tablet, Rfl: 0     blood glucose (ONE TOUCH VERIO IQ) test strip, Use to test blood sugars 2 times daily or as directed., Disp: 100 strip, Rfl: 0     blood glucose monitoring (KERLINE MICROLET) lancets, Use to test blood sugar 3 times daily or as directed., Disp: 100 each, Rfl: 11     buPROPion (WELLBUTRIN XL) 300 MG 24 hr tablet, Take 1 tablet (300 mg) by mouth every morning, Disp: 90 tablet, Rfl: 1     Calcium Carb-Cholecalciferol (CALCIUM 600 + D PO), Take 1 tablet by mouth daily, Disp: , Rfl:      citalopram (CELEXA) 40 MG tablet, Take 1 tablet (40 mg) by mouth daily, Disp: 90 tablet, Rfl: 1     Continuous Blood Gluc  (FREESTYLE GIULIANA 14 DAY READER) SIENA, 1 each continuous, Disp: 1 Device, Rfl: 0     Continuous Blood Gluc Sensor (FREESTYLE GIULIANA 14 DAY SENSOR) MISC, USE 1 SENSOR EVERY 14 DAYS, Disp: 2 each, Rfl: 11     Cyanocobalamin (VITAMIN B 12 PO), Take 250 mcg by mouth daily, Disp: , Rfl:      cyclobenzaprine (FLEXERIL) 10 MG tablet, Take 1 tablet (10 mg) by mouth 3 times daily as needed for muscle spasms, Disp: 30 tablet, Rfl: 1     ferrous sulfate 325 (65 FE) MG tablet, Take 1 tablet by mouth daily (with breakfast)., Disp: , Rfl:      finasteride (PROSCAR) 5 MG tablet, Take 1 tablet (5 mg) by mouth daily, Disp: 90 tablet, Rfl: 1     hydrocortisone (WESTCORT) 0.2 % cream, Apply topically 2 times daily as needed Apply sparingly to affected area, BID., Disp: 45 g, Rfl: 1     insulin  glargine (LANTUS SOLOSTAR) 100 UNIT/ML pen, 30 units qd.  Increase as directed to max dose of 45 units qd., Disp: 45 mL, Rfl: 1     insulin pen needle (B-D U/F) 31G X 5 MM miscellaneous, USE 4 DAILY OR AS DIRECTED., Disp: 400 each, Rfl: 3     LEVOXYL 137 MCG tablet, Take 1 tablet (137 mcg) by mouth daily Dispense name brand Levoxyl only, no generics, Disp: 90 tablet, Rfl: 1     losartan (COZAAR) 50 MG tablet, Take 1 tablet (50 mg) by mouth daily, Disp: 90 tablet, Rfl: 3     metFORMIN (GLUCOPHAGE-XR) 500 MG 24 hr tablet, TAKE 2 TABLETS (1,000 MG) BY MOUTH DAILY (WITH DINNER), Disp: , Rfl: 1     MULTI-VITAMIN OR TABS, 1 TABLET DAILY, Disp: 30, Rfl: 0     mupirocin (BACTROBAN) 2 % external ointment, Apply topically 3 times daily, Disp: 22 g, Rfl: 0     NOVOLOG FLEXPEN 100 UNIT/ML soln, Take 5 units before each meal. Increase as directed up to 45 units per day., Disp: 45 mL, Rfl: 3     omeprazole-sodium bicarbonate (ZEGERID)  MG per capsule, Take 1 capsule by mouth every morning (before breakfast), Disp: , Rfl:      simvastatin (ZOCOR) 20 MG tablet, Take 1 tablet (20 mg) by mouth At Bedtime, Disp: 90 tablet, Rfl: 3     VITAMIN D, CHOLECALCIFEROL, PO, Take 1,000 Units by mouth daily., Disp: , Rfl:      blood glucose monitoring (KERLINE CONTOUR NEXT) test strip, Use to test blood sugar 3 times daily or as directed. (Patient not taking: Reported on 12/5/2019), Disp: 100 strip, Rfl: 11     order for DME, Equipment being ordered: Please dispense up to 3 pairs of knee high compression stockings at 20-30 mmHg and one donning device if needed. (Patient not taking: Reported on 12/5/2019), Disp: 4 Device, Rfl: 0     order for DME, Equipment being ordered: Lt wrist splint (Patient not taking: Reported on 12/5/2019), Disp: 1 Device, Rfl: 0     ALLERGIES:  No Known Allergies     SOCIAL HISTORY:   Social History     Socioeconomic History     Marital status:      Spouse name: Sri     Number of children: 2     Years of  education: Not on file     Highest education level: Associate degree: occupational, technical, or vocational program   Occupational History     Occupation:      Employer: NONE      Comment: [employer]   Social Needs     Financial resource strain: Not hard at all     Food insecurity:     Worry: Never true     Inability: Never true     Transportation needs:     Medical: No     Non-medical: No   Tobacco Use     Smoking status: Never Smoker     Smokeless tobacco: Never Used   Substance and Sexual Activity     Alcohol use: Yes     Frequency: Monthly or less     Drinks per session: 1 or 2     Binge frequency: Never     Comment: Occassionally     Drug use: No     Sexual activity: Not Currently     Partners: Female     Birth control/protection: Abstinence   Lifestyle     Physical activity:     Days per week: 0 days     Minutes per session: 0 min     Stress: Not at all   Relationships     Social connections:     Talks on phone: Twice a week     Gets together: Not on file     Attends Jain service: Never     Active member of club or organization: Yes     Attends meetings of clubs or organizations: 1 to 4 times per year     Relationship status:      Intimate partner violence:     Fear of current or ex partner: Not on file     Emotionally abused: Not on file     Physically abused: Not on file     Forced sexual activity: Not on file   Other Topics Concern     Parent/sibling w/ CABG, MI or angioplasty before 65F 55M? No   Social History Narrative     Not on file        FAMILY HISTORY:   Family History   Problem Relation Age of Onset     Breast Cancer Mother      Hypertension Mother      Thyroid Disease Mother      Depression Mother      Alzheimer Disease Mother 82     Cancer - colorectal Father      Thyroid Disease Father      Depression Father      Other - See Comments Father         bladder polyps     Heart Disease Maternal Grandmother         CHF     Circulatory Paternal Grandmother      Cancer Paternal  "Grandfather      Diabetes Brother      Diabetes Brother      Asthma Brother         EXAM:Vitals: /80   Ht 1.676 m (5' 6\")   Wt 109.8 kg (242 lb)   BMI 39.06 kg/m         A1C: 11.7 (12/2019)    General appearance: Patient is alert and fully cooperative with history & exam.  No sign of distress is noted during the visit.     Psychiatric: Affect is pleasant & appropriate.  Patient appears motivated to improve health.     Respiratory: Breathing is regular & unlabored while sitting.     HEENT: Hearing is intact to spoken word.  Speech is clear.  No gross evidence of visual impairment that would impact ambulation.     Dermatologic: pre ulcerative hyperkeratotic lesion to plantar medial left 1st metatarsal phalangeal joint. No open lesions or signs of infection.      Vascular: DP & PT pulses are intact & regular bilaterally.  No significant edema or varicosities noted.  CFT and skin temperature is normal to both lower extremities.     Neurologic: Lower extremity sensation is diminished to both feet.      Musculoskeletal: Patient is ambulatory without assistive device or brace.  Decrease arch height. Minimal pain on palpation to the sinus tarsi left ankle. Muscle strength is 5/5 without pain to both feet.     ASSESSMENT:      Type 2 diabetes mellitus with diabetic neuropathy, unspecified whether long term insulin use (H)  Pre-ulcerative calluses  Neuropathic pain of right foot  Neuropathic pain of left foot     PLAN:  Reviewed patient's chart in epic. Talked about diabetes and her feet. Discussed causes of keratomas.  They are due to areas of increase friction.  Hammertoes can create these as they put more pressure to the metatarsal head.  Discussed treatments such as using foot file, pumice stone, metatarsal pads, orthotics, and not walking barefoot.      We discussed the cost structure of callus care if they were to come back and have it treated in the clinic if insurance does not cover it and explained that they " would be billed. They were also provided information on places to get the callus treatment.     Discussed these could lead to ulcers. Recommend lotion feet daily and using pumice stone.  talked about a carbon fiber plate to put in the shoe. He is going to think about it.     Discussed pain to feet likely neuropathic. Recommend he follow up with primary care doctor/endocrinologist for blood sugars. Also recommend over the counter topical pain cream.        Tena Mathis DPM, Podiatry/Foot and Ankle Surgery    Weight management plan: Patient was referred to their PCP to discuss a diet and exercise plan.    Recommended to Steve Morgan to follow up with Primary Care provider regarding elevated blood pressure.        Again, thank you for allowing me to participate in the care of your patient.        Sincerely,        Tena Mathis DPM, Podiatry/Foot and Ankle Surgery

## 2020-02-12 ENCOUNTER — TELEPHONE (OUTPATIENT)
Dept: ENDOCRINOLOGY | Facility: CLINIC | Age: 60
End: 2020-02-12

## 2020-02-12 ENCOUNTER — ALLIED HEALTH/NURSE VISIT (OUTPATIENT)
Dept: EDUCATION SERVICES | Facility: CLINIC | Age: 60
End: 2020-02-12
Payer: MEDICARE

## 2020-02-12 DIAGNOSIS — E11.40 TYPE 2 DIABETES MELLITUS WITH DIABETIC NEUROPATHY, WITHOUT LONG-TERM CURRENT USE OF INSULIN (H): Primary | ICD-10-CM

## 2020-02-12 NOTE — PATIENT INSTRUCTIONS
Try to keep track of your insulin doses and your carb grams on your Nina reader.    You can keep your insulin pen at room temperature, only the un-opened pens should stay in the fridge.      Please work on taking your Novolog consistently before every meal. Keep your insulin pen visible, and set reminders.     Continue Lantus 30 units.    Continue 5 units Novolog before meals, plus add the the following sliding scale if needed:    If blood glucose is: Add extra Novolog   150-200 1 unit   201-250 2 units   251-300 3 units   301-350 4 units   351-400 5 units   401-450 6 units   451-500 7 units

## 2020-02-12 NOTE — TELEPHONE ENCOUNTER
Insulin-Treated Diabetes Mellitus Driving Report form received. ([employer]V ph#[phone])  Per [employer]V cover letter that comes with the Insulin Treated Diabetes Mellitus Form, the DMV no longer accepts this form via fax.  Patient can mail the form to the DMV at the address listed on the form or patient can drop it off at the DM[employer] in person.  Form on Diana Butler's desk.  Rani Heck CMA on 2/12/2020 at 3:36 PM

## 2020-02-12 NOTE — LETTER
"    2/12/2020         RE: Steve Morgan  [id number]  [facility] [address]        Dear Colleague,    Thank you for referring your patient, Steve Morgan, to the [facility]. Please see a copy of my visit note below.    Diabetes Self-Management Education & Support    Presents for: Follow-up    SUBJECTIVE/OBJECTIVE:  Presents for: Follow-up  Accompanied by: Self  Diabetes education in the past 24mo: Yes  Focus of Visit: CGM, Taking Medication, Self-care behavioral goal setting  Diabetes type: Type 2  Disease course: Getting harder to manage  How confident are you filling out medical forms by yourself:: Not Assessed  Transportation concerns: No  Other concerns:: Physical impairment  Cultural Influences/Ethnic Background:  American    Diabetes Symptoms & Complications:  Fatigue: Yes  Neuropathy: Yes  Weight trend: Stable  Peripheral neuropathy: Yes  Peripheral Vascular Disease: Yes    Patient Problem List and Family Medical History reviewed for relevant medical history, current medical status, and diabetes risk factors.    Vitals:  There were no vitals taken for this visit.  Estimated body mass index is 39.06 kg/m  as calculated from the following:    Height as of 2/11/20: 1.676 m (5' 6\").    Weight as of 2/11/20: 109.8 kg (242 lb).   Last 3 BP:   BP Readings from Last 3 Encounters:   02/11/20 138/80   01/16/20 128/71   01/07/20 (!) 154/90       History   Smoking Status     Never Smoker   Smokeless Tobacco     Never Used       Labs:  Lab Results   Component Value Date    A1C 11.7 12/05/2019     Lab Results   Component Value Date     01/16/2020     Lab Results   Component Value Date    LDL 97 01/16/2020     HDL Cholesterol   Date Value Ref Range Status   01/16/2020 32 (L) >39 mg/dL Final   ]  GFR Estimate   Date Value Ref Range Status   01/16/2020 79 >60 mL/min/[1.73_m2] Final     Comment:     Non  GFR Calc  Starting 12/18/2018, serum creatinine based estimated " GFR (eGFR) will be   calculated using the Chronic Kidney Disease Epidemiology Collaboration   (CKD-EPI) equation.       GFR Estimate If Black   Date Value Ref Range Status   01/16/2020 >90 >60 mL/min/[1.73_m2] Final     Comment:      GFR Calc  Starting 12/18/2018, serum creatinine based estimated GFR (eGFR) will be   calculated using the Chronic Kidney Disease Epidemiology Collaboration   (CKD-EPI) equation.       Lab Results   Component Value Date    CR 1.01 01/16/2020     No results found for: MICROALBUMIN    Healthy Eating:  Healthy Eating Assessed Today: Yes  Cultural/Moravian diet restrictions?: No  Beverages: Water, Milk, Diet soda  Has patient met with a dietitian in the past?: Yes    Being Active:  Being Active Assessed Today: No  Exercise:: Currently not exercising  Barrier to exercise: Physical limitation    Monitoring:  Monitoring Assessed Today: Yes  Blood Glucose Meter: CGM    Taking Medications:  Diabetes Medication(s)     Biguanides       metFORMIN (GLUCOPHAGE-XR) 500 MG 24 hr tablet    TAKE 2 TABLETS (1,000 MG) BY MOUTH DAILY (WITH DINNER)    Insulin       insulin glargine (LANTUS SOLOSTAR) 100 UNIT/ML pen    30 units qd.  Increase as directed to max dose of 45 units qd.     NOVOLOG FLEXPEN 100 UNIT/ML soln    Take 5 units before each meal. Increase as directed up to 45 units per day.          Taking Medication Assessed Today: Yes  Problems taking diabetes medications regularly?: No  Diabetes medication side effects?: No    Problem Solving:  Problem Solving Assessed Today: Yes  Hypoglycemia Frequency: Never    Reducing Risks:  Reducing Risks Assessed Today: No    Healthy Coping:  Healthy Coping Assessed Today: Yes  Emotional response to diabetes: Concern for health and well-being  Stage of change: PREPARATION (Decided to change - considering how)  Patient Activation Measure Survey Score:  SHANNON Score (Last Two) 3/30/2017 1/15/2020   SHANNON Raw Score 29 36   Activation Score 52.9 75.5  "  SHANNON Level 2 4       Diabetes knowledge and skills assessment:   Patient is knowledgeable in diabetes management concepts related to: Healthy Eating, Being Active, Monitoring, Taking Medication, Problem Solving, Reducing Risks and Healthy Coping    Patient needs further education on the following diabetes management concepts: Healthy Eating, Monitoring, Taking Medication, Problem Solving and Healthy Coping    Based on learning assessment above, most appropriate setting for further diabetes education would be: Individual setting.    REPORTS:                      INTERVENTION:  Education provided today on:  AADE Self-Care Behaviors:  Healthy Eating: eating out and portion control  Monitoring: frequency of monitoring  Taking Medication: action of prescribed medication and importance of avoiding missed injections, setting visual reminders  Problem Solving: high blood glucose - causes, signs/symptoms, treatment and prevention, low blood glucose - causes, signs/symptoms, treatment and prevention and when to call health care provider  Healthy Coping: recognize glycemic effects on mood, benefits of making appropriate lifestyle changes,  and utilize support systems    Pt verbalized understanding of concepts discussed and recommendations provided today.       Education Materials Provided:  No new materials provided today    ASSESSMENT:  Noted worsened BG control since last visit. Pt reports \"I know my numbers are bad\", no longer tracking food on MyFitness Pal. Pt did  Rx for Novolog, but rarely taking it \"forgets\", thought he had to keep current pen in use in the fridge at all times. Discussed importance of taking Novolog consistently before meals, keep th pen visible (on kitchen table or counter), set reminders, etc.     Glucose Patterns & Trends:  Pattern of post meal hyperglycemia which continues over-night    PLAN  See Patient Instructions for co-developed, patient-stated behavior change goals.  AVS printed and " provided to patient today. See Follow-Up section for recommended follow-up.    Lucía Chávez RD, CDE  Diabetes     Time Spent: 45 minutes  Encounter Type: Individual    Any diabetes medication dose changes were made via the CDE Protocol and Collaborative Practice Agreement with the patient's endocrinology provider. A copy of this encounter was shared with the provider.

## 2020-02-12 NOTE — PROGRESS NOTES
"Diabetes Self-Management Education & Support    Presents for: Follow-up    SUBJECTIVE/OBJECTIVE:  Presents for: Follow-up  Accompanied by: Self  Diabetes education in the past 24mo: Yes  Focus of Visit: CGM, Taking Medication, Self-care behavioral goal setting  Diabetes type: Type 2  Disease course: Getting harder to manage  How confident are you filling out medical forms by yourself:: Not Assessed  Transportation concerns: No  Other concerns:: Physical impairment  Cultural Influences/Ethnic Background:  American    Diabetes Symptoms & Complications:  Fatigue: Yes  Neuropathy: Yes  Weight trend: Stable  Peripheral neuropathy: Yes  Peripheral Vascular Disease: Yes    Patient Problem List and Family Medical History reviewed for relevant medical history, current medical status, and diabetes risk factors.    Vitals:  There were no vitals taken for this visit.  Estimated body mass index is 39.06 kg/m  as calculated from the following:    Height as of 2/11/20: 1.676 m (5' 6\").    Weight as of 2/11/20: 109.8 kg (242 lb).   Last 3 BP:   BP Readings from Last 3 Encounters:   02/11/20 138/80   01/16/20 128/71   01/07/20 (!) 154/90       History   Smoking Status     Never Smoker   Smokeless Tobacco     Never Used       Labs:  Lab Results   Component Value Date    A1C 11.7 12/05/2019     Lab Results   Component Value Date     01/16/2020     Lab Results   Component Value Date    LDL 97 01/16/2020     HDL Cholesterol   Date Value Ref Range Status   01/16/2020 32 (L) >39 mg/dL Final   ]  GFR Estimate   Date Value Ref Range Status   01/16/2020 79 >60 mL/min/[1.73_m2] Final     Comment:     Non  GFR Calc  Starting 12/18/2018, serum creatinine based estimated GFR (eGFR) will be   calculated using the Chronic Kidney Disease Epidemiology Collaboration   (CKD-EPI) equation.       GFR Estimate If Black   Date Value Ref Range Status   01/16/2020 >90 >60 mL/min/[1.73_m2] Final     Comment:      GFR " Calc  Starting 12/18/2018, serum creatinine based estimated GFR (eGFR) will be   calculated using the Chronic Kidney Disease Epidemiology Collaboration   (CKD-EPI) equation.       Lab Results   Component Value Date    CR 1.01 01/16/2020     No results found for: MICROALBUMIN    Healthy Eating:  Healthy Eating Assessed Today: Yes  Cultural/Orthodoxy diet restrictions?: No  Beverages: Water, Milk, Diet soda  Has patient met with a dietitian in the past?: Yes    Being Active:  Being Active Assessed Today: No  Exercise:: Currently not exercising  Barrier to exercise: Physical limitation    Monitoring:  Monitoring Assessed Today: Yes  Blood Glucose Meter: CGM    Taking Medications:  Diabetes Medication(s)     Biguanides       metFORMIN (GLUCOPHAGE-XR) 500 MG 24 hr tablet    TAKE 2 TABLETS (1,000 MG) BY MOUTH DAILY (WITH DINNER)    Insulin       insulin glargine (LANTUS SOLOSTAR) 100 UNIT/ML pen    30 units qd.  Increase as directed to max dose of 45 units qd.     NOVOLOG FLEXPEN 100 UNIT/ML soln    Take 5 units before each meal. Increase as directed up to 45 units per day.          Taking Medication Assessed Today: Yes  Problems taking diabetes medications regularly?: No  Diabetes medication side effects?: No    Problem Solving:  Problem Solving Assessed Today: Yes  Hypoglycemia Frequency: Never    Reducing Risks:  Reducing Risks Assessed Today: No    Healthy Coping:  Healthy Coping Assessed Today: Yes  Emotional response to diabetes: Concern for health and well-being  Stage of change: PREPARATION (Decided to change - considering how)  Patient Activation Measure Survey Score:  SHANNON Score (Last Two) 3/30/2017 1/15/2020   SHANNON Raw Score 29 36   Activation Score 52.9 75.5   SHANNON Level 2 4       Diabetes knowledge and skills assessment:   Patient is knowledgeable in diabetes management concepts related to: Healthy Eating, Being Active, Monitoring, Taking Medication, Problem Solving, Reducing Risks and Healthy Coping    Patient  "needs further education on the following diabetes management concepts: Healthy Eating, Monitoring, Taking Medication, Problem Solving and Healthy Coping    Based on learning assessment above, most appropriate setting for further diabetes education would be: Individual setting.    REPORTS:                      INTERVENTION:  Education provided today on:  AADE Self-Care Behaviors:  Healthy Eating: eating out and portion control  Monitoring: frequency of monitoring  Taking Medication: action of prescribed medication and importance of avoiding missed injections, setting visual reminders  Problem Solving: high blood glucose - causes, signs/symptoms, treatment and prevention, low blood glucose - causes, signs/symptoms, treatment and prevention and when to call health care provider  Healthy Coping: recognize glycemic effects on mood, benefits of making appropriate lifestyle changes,  and utilize support systems    Pt verbalized understanding of concepts discussed and recommendations provided today.       Education Materials Provided:  No new materials provided today    ASSESSMENT:  Noted worsened BG control since last visit. Pt reports \"I know my numbers are bad\", no longer tracking food on MyFitness Pal. Pt did  Rx for Novolog, but rarely taking it \"forgets\", thought he had to keep current pen in use in the fridge at all times. Discussed importance of taking Novolog consistently before meals, keep th pen visible (on kitchen table or counter), set reminders, etc.     Glucose Patterns & Trends:  Pattern of post meal hyperglycemia which continues over-night    PLAN  See Patient Instructions for co-developed, patient-stated behavior change goals.  AVS printed and provided to patient today. See Follow-Up section for recommended follow-up.    Lucía Chávez RD, CDE  Diabetes     Time Spent: 45 minutes  Encounter Type: Individual    Any diabetes medication dose changes were made via the CDE Protocol and " Collaborative Practice Agreement with the patient's endocrinology provider. A copy of this encounter was shared with the provider.

## 2020-02-13 NOTE — TELEPHONE ENCOUNTER
Insulin-Treated Diabetes Mellitus Report form completed.  Original and an additional copy in an envelope and ready for the patient.  Patient will need to mail a copy to the Department of Public Safety per the cover letter that comes with the form or the patient can drop it off in person.  They no longer accept the form via fax.    Copy sent to abstracting to be entered into the EMR.  Left voicemail informing patient that form is at  for  but to call back and let us know if he prefers forms to be mailed to his home address.    Sent the following CloudByte message to the patient:    Sammy Kearney,    The insulin driving form for the [employer] that you dropped off is ready for .  Diana Butler NP signed it and I put it along with an additional copy for your records at the  for .  You will want to either drop off the original to the [employer] directly or mail it to them at the address listed on the top of the form.  If you prefer the form be mailed to your home address, you will want to call me and let me know.  Take care.    Thank you,  Rani Heck M.A.  Endocrinology  [facility]  [phone] [facility]         Rani Heck, CESAR on 2/13/2020 at 1:58 PM

## 2020-03-05 ENCOUNTER — APPOINTMENT (OUTPATIENT)
Dept: LAB | Facility: CLINIC | Age: 60
End: 2020-03-05
Payer: MEDICARE

## 2020-03-05 ENCOUNTER — ALLIED HEALTH/NURSE VISIT (OUTPATIENT)
Dept: EDUCATION SERVICES | Facility: CLINIC | Age: 60
End: 2020-03-05
Payer: MEDICARE

## 2020-03-05 DIAGNOSIS — E11.40 TYPE 2 DIABETES MELLITUS WITH DIABETIC NEUROPATHY, WITH LONG-TERM CURRENT USE OF INSULIN (H): ICD-10-CM

## 2020-03-05 DIAGNOSIS — E78.5 HYPERLIPIDEMIA LDL GOAL <100: ICD-10-CM

## 2020-03-05 DIAGNOSIS — E03.4 HYPOTHYROIDISM DUE TO ACQUIRED ATROPHY OF THYROID: ICD-10-CM

## 2020-03-05 DIAGNOSIS — Z79.4 TYPE 2 DIABETES MELLITUS WITH DIABETIC NEUROPATHY, WITH LONG-TERM CURRENT USE OF INSULIN (H): ICD-10-CM

## 2020-03-05 DIAGNOSIS — E87.1 HYPONATREMIA: ICD-10-CM

## 2020-03-05 DIAGNOSIS — E11.40 TYPE 2 DIABETES MELLITUS WITH DIABETIC NEUROPATHY, WITHOUT LONG-TERM CURRENT USE OF INSULIN (H): ICD-10-CM

## 2020-03-05 DIAGNOSIS — R73.9 HYPERGLYCEMIA: ICD-10-CM

## 2020-03-05 LAB
CHOLEST SERPL-MCNC: 139 MG/DL
HDLC SERPL-MCNC: 33 MG/DL
LDLC SERPL CALC-MCNC: 88 MG/DL
NONHDLC SERPL-MCNC: 106 MG/DL
SODIUM SERPL-SCNC: 130 MMOL/L (ref 133–144)
TRIGL SERPL-MCNC: 89 MG/DL
TSH SERPL DL<=0.005 MIU/L-ACNC: 0.55 MU/L (ref 0.4–4)

## 2020-03-05 PROCEDURE — 84295 ASSAY OF SERUM SODIUM: CPT | Performed by: PHYSICIAN ASSISTANT

## 2020-03-05 PROCEDURE — 36415 COLL VENOUS BLD VENIPUNCTURE: CPT | Performed by: PHYSICIAN ASSISTANT

## 2020-03-05 PROCEDURE — 80061 LIPID PANEL: CPT | Performed by: PHYSICIAN ASSISTANT

## 2020-03-05 PROCEDURE — 84443 ASSAY THYROID STIM HORMONE: CPT | Performed by: PHYSICIAN ASSISTANT

## 2020-03-05 PROCEDURE — G0108 DIAB MANAGE TRN  PER INDIV: HCPCS | Performed by: DIETITIAN, REGISTERED

## 2020-03-05 PROCEDURE — 82043 UR ALBUMIN QUANTITATIVE: CPT | Performed by: CLINICAL NURSE SPECIALIST

## 2020-03-05 RX ORDER — INSULIN ASPART 100 [IU]/ML
INJECTION, SOLUTION INTRAVENOUS; SUBCUTANEOUS
Qty: 45 ML | Refills: 3
Start: 2020-03-05 | End: 2020-04-09

## 2020-03-05 NOTE — LETTER
"    3/5/2020         RE: Steve Mrogan  [id number]  [facility] [address]        Dear Colleague,    Thank you for referring your patient, Steve Morgan, to the [facility]. Please see a copy of my visit note below.    Diabetes Self-Management Education & Support    Presents for: Follow-up    SUBJECTIVE/OBJECTIVE:  Presents for: Follow-up  Accompanied by: Self  Diabetes education in the past 24mo: (P) Yes  Focus of Visit: CGM, Assistance w/ making life changes, Healthy Eating, Problem Solving, Taking Medication  Diabetes type: (P) Type 2  Disease course: Worsening  How confident are you filling out medical forms by yourself:: Not Assessed  Transportation concerns: No  Other concerns:: Physical impairment  Cultural Influences/Ethnic Background:  American    Diabetes Symptoms & Complications:  Fatigue: (P) Sometimes  Neuropathy: (P) No  Polydipsia: (P) Yes  Polyphagia: (P) Yes  Polyuria: (P) No  Visual change: (P) No  Slow healing wounds: (P) Yes  Autonomic neuropathy: (P) No  CVA: (P) No  Heart disease: (P) No  Nephropathy: (P) No  Peripheral neuropathy: (P) Yes  Peripheral Vascular Disease: (P) No  Retinopathy: (P) No  Sexual dysfunction: (P) Yes    Patient Problem List and Family Medical History reviewed for relevant medical history, current medical status, and diabetes risk factors.    Vitals:  There were no vitals taken for this visit.  Estimated body mass index is 39.06 kg/m  as calculated from the following:    Height as of 2/11/20: 1.676 m (5' 6\").    Weight as of 2/11/20: 109.8 kg (242 lb).   Last 3 BP:   BP Readings from Last 3 Encounters:   02/11/20 138/80   01/16/20 128/71   01/07/20 (!) 154/90       History   Smoking Status     Never Smoker   Smokeless Tobacco     Never Used       Labs:  Lab Results   Component Value Date    A1C 11.7 12/05/2019     Lab Results   Component Value Date     01/16/2020     Lab Results   Component Value Date    LDL 88 03/05/2020 "     HDL Cholesterol   Date Value Ref Range Status   03/05/2020 33 (L) >39 mg/dL Final   ]  GFR Estimate   Date Value Ref Range Status   01/16/2020 79 >60 mL/min/[1.73_m2] Final     Comment:     Non  GFR Calc  Starting 12/18/2018, serum creatinine based estimated GFR (eGFR) will be   calculated using the Chronic Kidney Disease Epidemiology Collaboration   (CKD-EPI) equation.       GFR Estimate If Black   Date Value Ref Range Status   01/16/2020 >90 >60 mL/min/[1.73_m2] Final     Comment:      GFR Calc  Starting 12/18/2018, serum creatinine based estimated GFR (eGFR) will be   calculated using the Chronic Kidney Disease Epidemiology Collaboration   (CKD-EPI) equation.       Lab Results   Component Value Date    CR 1.01 01/16/2020     No results found for: MICROALBUMIN    Healthy Eating:  Healthy Eating Assessed Today: Yes  Cultural/Zoroastrianism diet restrictions?: (P) No  Meal planning/habits: (P) Avoiding sweets, Calorie counting, Carb counting, Smaller portions, Keeps food records  How many times a week on average do you eat food made away from home (restaurant/take-out)?: (P) 2  Meals include: (P) Lunch, Dinner, Afternoon Snack, Evening Snack  Beverages: (P) Water, Diet soda  Has patient met with a dietitian in the past?: Yes    Being Active:  Being Active Assessed Today: Yes  Exercise:: Currently not exercising  Barrier to exercise: (P) Other    Monitoring:  Monitoring Assessed Today: Yes  Did patient bring glucose meter to appointment? : Yes  Blood Glucose Meter: CGM  Times checking blood sugar at home (number): (P) 5+  Times checking blood sugar at home (per): (P) Day  Blood glucose trend: (P) Fluctuating    Taking Medications:  Diabetes Medication(s)     Biguanides       metFORMIN (GLUCOPHAGE-XR) 500 MG 24 hr tablet    TAKE 2 TABLETS (1,000 MG) BY MOUTH DAILY (WITH DINNER)    Insulin       insulin glargine (LANTUS SOLOSTAR) 100 UNIT/ML pen    30 units qd.  Increase as directed to  max dose of 45 units qd.     NOVOLOG FLEXPEN 100 UNIT/ML soln    Take 5 units before each meal. Increase as directed up to 45 units per day.        Taking Medication Assessed Today: Yes  Current Treatments: (P) Diet, Insulin Injections, Oral Medication (taken by mouth)    Problem Solving:  Problem Solving Assessed Today: Yes  Is the patient at risk for hypoglycemia?: Yes  Hypoglycemia Frequency: Never    Reducing Risks:  Reducing Risks Assessed Today: No  CAD Risks: (P) Diabetes Mellitus, Dyslipidemia, Family history, Hypertension, Obesity, Sedentary lifestyle  Has dilated eye exam at least once a year?: (P) Yes  Sees dentist every 6 months?: (P) Yes  Feet checked by healthcare provider in the last year?: (P) Yes    Healthy Coping:  Healthy Coping Assessed Today: Yes  Emotional response to diabetes: Concern for health and well-being  Informal Support system:: (P) Children, Family, Spouse  Stage of change: ACTION (Actively working towards change)  Patient Activation Measure Survey Score:  SHANNON Score (Last Two) 3/30/2017 1/15/2020   SHANNON Raw Score 29 36   Activation Score 52.9 75.5   SHANNON Level 2 4       Diabetes knowledge and skills assessment:   Patient is knowledgeable in diabetes management concepts related to: Healthy Eating, Monitoring, Taking Medication, Problem Solving and Healthy Coping    Patient needs further education on the following diabetes management concepts: Healthy Eating, Being Active, Monitoring, Taking Medication, Problem Solving, Reducing Risks and Healthy Coping    Based on learning assessment above, most appropriate setting for further diabetes education would be: Individual setting.      REPORTS:                  INTERVENTION:  Education provided today on:  AADE Self-Care Behaviors:  Healthy Eating: carbohydrate counting, portion control and mindful eating  Being Active: relationship to blood glucose, describe appropriate activity program and setting realistic goals  Monitoring: identify and  interpret trends  Taking Medication: action of prescribed medication, when to take medications and matching insulin to carb intake, use of I:C dosing + correction  Problem Solving: high blood glucose - causes, signs/symptoms, treatment and prevention  Reducing Risks: prevention, early diagnostic measures and treatment of complications  Healthy Coping: benefits of making appropriate lifestyle changes and utilize support systems    CGM-specific education:   Use of trends and graphs for pattern management and problem solving and Dosing insulin based on sensor glucose results    Pt verbalized understanding of concepts discussed and recommendations provided today.       Education Materials Provided:  No new materials provided today    ASSESSMENT:  Glucose Patterns & Trends:  Intermittent post-meal hyperglycemia due to diet inconsistencies,  CGM avg and time in range improved since last visit, glucose values typically above target  -doing better with medication adherence, fewer missed injections, sometimes take Novolog right before or during meal, had questions about insulin timing  -pt may benefit from a flexible MDI regimen, spent much of visit discussing use of I:C dosing- pt very receptive, is comfortable using correction scale  -wants to do better with portion control, is reducing intake of candy and sweets, discussed mindful eating practices  -plans to increase exercise, thinks he would improve his depression, discussed setting realistic goals  -is working on positive self talk, reinforced importance of and additional considerations for healthy behavior change    PLAN  See Patient Instructions for co-developed, patient-stated behavior change goals.  AVS printed and provided to patient today. See Follow-Up section for recommended follow-up.    Lucía Chávez RD, CDE  Diabetes     Time Spent: 60 minutes  Encounter Type: Individual    Any diabetes medication dose changes were made via the CDE Protocol  and Collaborative Practice Agreement with the patient's endocrinology provider. A copy of this encounter was shared with the provider.

## 2020-03-05 NOTE — PATIENT INSTRUCTIONS
Please increase your Lantus to 32 units.     Novolog: try using an insulin to carb ratio of 1 unit for every 10 grams of carbohydrate. Continue to use your correction scale per below.     Try to keep track of your insulin doses and your carb grams on your Nina reader or Docitt Pal.      Increase your exercise as discussed. Start with small goals, eg 5 min on the elliptical machine, or short walks as able.       If blood glucose is: Add extra Novolog   150-200 1 unit   201-250 2 units   251-300 3 units   301-350 4 units   351-400 5 units   401-450 6 units   451-500 7 units

## 2020-03-05 NOTE — PROGRESS NOTES
"Diabetes Self-Management Education & Support    Presents for: Follow-up    SUBJECTIVE/OBJECTIVE:  Presents for: Follow-up  Accompanied by: Self  Diabetes education in the past 24mo: (P) Yes  Focus of Visit: CGM, Assistance w/ making life changes, Healthy Eating, Problem Solving, Taking Medication  Diabetes type: (P) Type 2  Disease course: Worsening  How confident are you filling out medical forms by yourself:: Not Assessed  Transportation concerns: No  Other concerns:: Physical impairment  Cultural Influences/Ethnic Background:  American    Diabetes Symptoms & Complications:  Fatigue: (P) Sometimes  Neuropathy: (P) No  Polydipsia: (P) Yes  Polyphagia: (P) Yes  Polyuria: (P) No  Visual change: (P) No  Slow healing wounds: (P) Yes  Autonomic neuropathy: (P) No  CVA: (P) No  Heart disease: (P) No  Nephropathy: (P) No  Peripheral neuropathy: (P) Yes  Peripheral Vascular Disease: (P) No  Retinopathy: (P) No  Sexual dysfunction: (P) Yes    Patient Problem List and Family Medical History reviewed for relevant medical history, current medical status, and diabetes risk factors.    Vitals:  There were no vitals taken for this visit.  Estimated body mass index is 39.06 kg/m  as calculated from the following:    Height as of 2/11/20: 1.676 m (5' 6\").    Weight as of 2/11/20: 109.8 kg (242 lb).   Last 3 BP:   BP Readings from Last 3 Encounters:   02/11/20 138/80   01/16/20 128/71   01/07/20 (!) 154/90       History   Smoking Status     Never Smoker   Smokeless Tobacco     Never Used       Labs:  Lab Results   Component Value Date    A1C 11.7 12/05/2019     Lab Results   Component Value Date     01/16/2020     Lab Results   Component Value Date    LDL 88 03/05/2020     HDL Cholesterol   Date Value Ref Range Status   03/05/2020 33 (L) >39 mg/dL Final   ]  GFR Estimate   Date Value Ref Range Status   01/16/2020 79 >60 mL/min/[1.73_m2] Final     Comment:     Non  GFR Calc  Starting 12/18/2018, serum " creatinine based estimated GFR (eGFR) will be   calculated using the Chronic Kidney Disease Epidemiology Collaboration   (CKD-EPI) equation.       GFR Estimate If Black   Date Value Ref Range Status   01/16/2020 >90 >60 mL/min/[1.73_m2] Final     Comment:      GFR Calc  Starting 12/18/2018, serum creatinine based estimated GFR (eGFR) will be   calculated using the Chronic Kidney Disease Epidemiology Collaboration   (CKD-EPI) equation.       Lab Results   Component Value Date    CR 1.01 01/16/2020     No results found for: MICROALBUMIN    Healthy Eating:  Healthy Eating Assessed Today: Yes  Cultural/Caodaism diet restrictions?: (P) No  Meal planning/habits: (P) Avoiding sweets, Calorie counting, Carb counting, Smaller portions, Keeps food records  How many times a week on average do you eat food made away from home (restaurant/take-out)?: (P) 2  Meals include: (P) Lunch, Dinner, Afternoon Snack, Evening Snack  Beverages: (P) Water, Diet soda  Has patient met with a dietitian in the past?: Yes    Being Active:  Being Active Assessed Today: Yes  Exercise:: Currently not exercising  Barrier to exercise: (P) Other    Monitoring:  Monitoring Assessed Today: Yes  Did patient bring glucose meter to appointment? : Yes  Blood Glucose Meter: CGM  Times checking blood sugar at home (number): (P) 5+  Times checking blood sugar at home (per): (P) Day  Blood glucose trend: (P) Fluctuating    Taking Medications:  Diabetes Medication(s)     Biguanides       metFORMIN (GLUCOPHAGE-XR) 500 MG 24 hr tablet    TAKE 2 TABLETS (1,000 MG) BY MOUTH DAILY (WITH DINNER)    Insulin       insulin glargine (LANTUS SOLOSTAR) 100 UNIT/ML pen    30 units qd.  Increase as directed to max dose of 45 units qd.     NOVOLOG FLEXPEN 100 UNIT/ML soln    Take 5 units before each meal. Increase as directed up to 45 units per day.        Taking Medication Assessed Today: Yes  Current Treatments: (P) Diet, Insulin Injections, Oral Medication  (taken by mouth)    Problem Solving:  Problem Solving Assessed Today: Yes  Is the patient at risk for hypoglycemia?: Yes  Hypoglycemia Frequency: Never    Reducing Risks:  Reducing Risks Assessed Today: No  CAD Risks: (P) Diabetes Mellitus, Dyslipidemia, Family history, Hypertension, Obesity, Sedentary lifestyle  Has dilated eye exam at least once a year?: (P) Yes  Sees dentist every 6 months?: (P) Yes  Feet checked by healthcare provider in the last year?: (P) Yes    Healthy Coping:  Healthy Coping Assessed Today: Yes  Emotional response to diabetes: Concern for health and well-being  Informal Support system:: (P) Children, Family, Spouse  Stage of change: ACTION (Actively working towards change)  Patient Activation Measure Survey Score:  SHANNON Score (Last Two) 3/30/2017 1/15/2020   SHANNON Raw Score 29 36   Activation Score 52.9 75.5   SHANNON Level 2 4       Diabetes knowledge and skills assessment:   Patient is knowledgeable in diabetes management concepts related to: Healthy Eating, Monitoring, Taking Medication, Problem Solving and Healthy Coping    Patient needs further education on the following diabetes management concepts: Healthy Eating, Being Active, Monitoring, Taking Medication, Problem Solving, Reducing Risks and Healthy Coping    Based on learning assessment above, most appropriate setting for further diabetes education would be: Individual setting.      REPORTS:                  INTERVENTION:  Education provided today on:  AADE Self-Care Behaviors:  Healthy Eating: carbohydrate counting, portion control and mindful eating  Being Active: relationship to blood glucose, describe appropriate activity program and setting realistic goals  Monitoring: identify and interpret trends  Taking Medication: action of prescribed medication, when to take medications and matching insulin to carb intake, use of I:C dosing + correction  Problem Solving: high blood glucose - causes, signs/symptoms, treatment and  prevention  Reducing Risks: prevention, early diagnostic measures and treatment of complications  Healthy Coping: benefits of making appropriate lifestyle changes and utilize support systems    CGM-specific education:   Use of trends and graphs for pattern management and problem solving and Dosing insulin based on sensor glucose results    Pt verbalized understanding of concepts discussed and recommendations provided today.       Education Materials Provided:  No new materials provided today    ASSESSMENT:  Glucose Patterns & Trends:  Intermittent post-meal hyperglycemia due to diet inconsistencies,  CGM avg and time in range improved since last visit, glucose values typically above target  -doing better with medication adherence, fewer missed injections, sometimes take Novolog right before or during meal, had questions about insulin timing  -pt may benefit from a flexible MDI regimen, spent much of visit discussing use of I:C dosing- pt very receptive, is comfortable using correction scale  -wants to do better with portion control, is reducing intake of candy and sweets, discussed mindful eating practices  -plans to increase exercise, thinks he would improve his depression, discussed setting realistic goals  -is working on positive self talk, reinforced importance of and additional considerations for healthy behavior change    PLAN  See Patient Instructions for co-developed, patient-stated behavior change goals.  AVS printed and provided to patient today. See Follow-Up section for recommended follow-up.    Lucía Chávez RD, CDE  Diabetes     Time Spent: 60 minutes  Encounter Type: Individual    Any diabetes medication dose changes were made via the CDE Protocol and Collaborative Practice Agreement with the patient's endocrinology provider. A copy of this encounter was shared with the provider.

## 2020-03-06 LAB
CREAT UR-MCNC: 18 MG/DL
MICROALBUMIN UR-MCNC: 14 MG/L
MICROALBUMIN/CREAT UR: 81.46 MG/G CR (ref 0–17)

## 2020-03-06 NOTE — RESULT ENCOUNTER NOTE
Caio,  Your urine protein continues to be elevated. The treatment is to keep blood pressure and blood sugars in good control and continue to take Losartan regularly.  Your thyroid test is in normal range. Please continue Levoxyl 137 mcg/day.  I'll update your prescriptions when you come in for follow up next week.  Diana Butler NP  Endocrinology

## 2020-03-12 ENCOUNTER — OFFICE VISIT (OUTPATIENT)
Dept: ENDOCRINOLOGY | Facility: CLINIC | Age: 60
End: 2020-03-12
Payer: MEDICARE

## 2020-03-12 VITALS
HEART RATE: 82 BPM | BODY MASS INDEX: 38.09 KG/M2 | TEMPERATURE: 98.2 F | OXYGEN SATURATION: 96 % | DIASTOLIC BLOOD PRESSURE: 82 MMHG | SYSTOLIC BLOOD PRESSURE: 128 MMHG | WEIGHT: 236 LBS

## 2020-03-12 DIAGNOSIS — E03.4 HYPOTHYROIDISM DUE TO ACQUIRED ATROPHY OF THYROID: ICD-10-CM

## 2020-03-12 DIAGNOSIS — B37.2 YEAST INFECTION OF THE SKIN: ICD-10-CM

## 2020-03-12 DIAGNOSIS — E11.40 TYPE 2 DIABETES MELLITUS WITH DIABETIC NEUROPATHY, UNSPECIFIED WHETHER LONG TERM INSULIN USE (H): Primary | ICD-10-CM

## 2020-03-12 DIAGNOSIS — E78.5 HYPERLIPIDEMIA LDL GOAL <100: ICD-10-CM

## 2020-03-12 DIAGNOSIS — L84 CALLUS OF FOOT: ICD-10-CM

## 2020-03-12 DIAGNOSIS — I10 BENIGN ESSENTIAL HYPERTENSION: ICD-10-CM

## 2020-03-12 LAB — HBA1C MFR BLD: 9.9 % (ref 0–5.6)

## 2020-03-12 PROCEDURE — 36415 COLL VENOUS BLD VENIPUNCTURE: CPT | Performed by: CLINICAL NURSE SPECIALIST

## 2020-03-12 PROCEDURE — 99214 OFFICE O/P EST MOD 30 MIN: CPT | Performed by: CLINICAL NURSE SPECIALIST

## 2020-03-12 PROCEDURE — 83036 HEMOGLOBIN GLYCOSYLATED A1C: CPT | Performed by: CLINICAL NURSE SPECIALIST

## 2020-03-12 RX ORDER — NYSTATIN 100000 U/G
CREAM TOPICAL 2 TIMES DAILY
Qty: 45 G | Refills: 0 | Status: SHIPPED | OUTPATIENT
Start: 2020-03-12 | End: 2020-08-10

## 2020-03-12 RX ORDER — LEVOTHYROXINE SODIUM 137 UG/1
137 TABLET ORAL DAILY
Qty: 90 TABLET | Refills: 1 | Status: SHIPPED | OUTPATIENT
Start: 2020-03-12 | End: 2020-10-20

## 2020-03-12 NOTE — PROGRESS NOTES
"Name: Steve \"Caio\" Cathy  F/u for Diabetes (Last seen 12/5/2019).  HPI:  Steve Morgan is a 60 year old male who presents for the management of:    1. Type 2 DM:  Orginally diagnosed at the age of 33 or 38.  Was prediabetic prior, was not particularly symptomatic at the time, was noted on routine lab work    Current Regimen: Metformin 1000 mg qd, Lantus 32 units every day - takes 34 units about 3x/week if bedtime glucose is elevated (started Lantus 5/2017). Novolog 1:10 + 1:50>150 (has a printed correction scale)  The plan was to start Trulicity but the copay was not affordable ($100 per refill).    Has been unable to tolerate a higher dose of Metformin due to diarrhea.  Still has some diarrhea at the 1000 mg/day dose.    BS checks: continuously via [employer] Download: Average ; 55% above target range, 45% in target range.  0% below range.  BG target range .  Unable to use glucose meter - he has a hard time doing fingerstick testing due to his tremor.    Last diabetes ed appointment:  3/5/2020.      He requests a refill of hydrocortisone cream.  He states he has been applying this to a red area between the skin folds of his lower abdomen although without improvement.  He states this area is pruritic.  He denies noting any drainage or foul odor in this area.      Complications:   Diabetes Complications  Description / Detail    Diabetic Retinopathy  No retinopathy,last exam 2/2020, [facility]   CAD / PAD  No   Neuropathy  Yes + diabetic neuropathy: numbness hands and feet.  Saw podiatry, Dr. Mathis, 1/27/2018- using diabetic shoes   Nephropathy / Microalbuminuria  Yes, elevated urine microalbumin   Gastroparesis  No   Hypoglycemia [place]  Not sure, gets 'kind of dizzy' when blood sugar is low but reports history of low blood sugars without symptoms     2. Hypertension: Blood Pressure today:   BP Readings from Last 3 Encounters:   03/12/20 128/82   02/11/20 138/80   01/16/20 128/71   .  Blood " pressure medications include atenolol 25 mg qd and losartan 50 mg every day.   3. Hyperlipidemia: Takes simvastatin 20 mg for lipid control.       4. Prevention:  Flu Shot- 10/2019  Pneumovax- 2012  Opthalmology-Yes: annually  Dental-Yes: twice a year  ASA-Yes, 81 mg qd   Smoking- No  5.  Hypothyroidism. Currently treated with levoxyl (KAMERON) 125 mcg daily. Takes the levoxyl first thing in the morning and waits 30+ minutes before eating breakfast.  PA for Levoxyl approved through 2/18/2020.  PMH/PSH:  Past Medical History:   Diagnosis Date     Cellulitis and abscess of trunk 6/27/2017     Depression      Hyperlipidemia LDL goal <100 10/31/2010     Hypertension goal BP (blood pressure) < 140/90 3/17/2011     Hypothyroidism 1/12/2010     Morbid obesity due to excess calories (H) 1/12/2010     Neuropathy in diabetes (H) 1/12/2010     Obesity 1/12/2010     Sleep apnea 1/12/2010    CPAP     Type 2 diabetes, HbA1C goal < 8% (H) 3/8/2011     Past Surgical History:   Procedure Laterality Date     BIOPSY       COLONOSCOPY       EYE SURGERY       GENITOURINARY SURGERY      surg for undescended testicle     REPAIR HAMMER TOE BILATERAL  5/16/2013    Procedure: REPAIR HAMMER TOE BILATERAL;  Flexor Tenotomy Toes 2,3,4,5 Bilateral Feet;  Surgeon: Saad Bangura DPM;  Location: [facility] OR     Family Hx:  Family History   Problem Relation Age of Onset     Breast Cancer Mother      Hypertension Mother      Thyroid Disease Mother      Depression Mother      Alzheimer Disease Mother 82     Cancer - colorectal Father      Thyroid Disease Father      Depression Father      Other - See Comments Father         bladder polyps     Heart Disease Maternal Grandmother         CHF     Circulatory Paternal Grandmother      Cancer Paternal Grandfather      Diabetes Brother      Diabetes Brother      Asthma Brother      Thyroid disease: Yes: mother         DM2: Yes: one brother with type 1 diabetes and one brother with type 2 diabetes, paternal aunt  with DM2         Autoimmune: DM1, SLE, RA, Vitiligo Yes: brother with DM1    Social Hx:  Social History     Socioeconomic History     Marital status:      Spouse name: Sri     Number of children: 2     Years of education: Not on file     Highest education level: Associate degree: occupational, technical, or vocational program   Occupational History     Occupation:      Employer: NONE      Comment: [employer]   Social Needs     Financial resource strain: Not hard at all     Food insecurity     Worry: Never true     Inability: Never true     Transportation needs     Medical: No     Non-medical: No   Tobacco Use     Smoking status: Never Smoker     Smokeless tobacco: Never Used   Substance and Sexual Activity     Alcohol use: Yes     Frequency: Monthly or less     Drinks per session: 1 or 2     Binge frequency: Never     Comment: Occassionally     Drug use: No     Sexual activity: Not Currently     Partners: Female     Birth control/protection: Abstinence   Lifestyle     Physical activity     Days per week: 0 days     Minutes per session: 0 min     Stress: Not at all   Relationships     Social connections     Talks on phone: Twice a week     Gets together: Not on file     Attends Yazidism service: Never     Active member of club or organization: Yes     Attends meetings of clubs or organizations: 1 to 4 times per year     Relationship status:      Intimate partner violence     Fear of current or ex partner: Not on file     Emotionally abused: Not on file     Physically abused: Not on file     Forced sexual activity: Not on file   Other Topics Concern     Parent/sibling w/ CABG, MI or angioplasty before 65F 55M? No   Social History Narrative     Not on file          MEDICATIONS:  has a current medication list which includes the following prescription(s): albuterol, ammonium lactate, ammonium lactate, aspirin, atenolol, blood glucose, blood glucose monitoring, bupropion, calcium  carb-cholecalciferol, citalopram, freestyle juan 14 day reader, freestyle juan 14 day sensor, cyanocobalamin, cyclobenzaprine, ferrous sulfate, finasteride, hydrocortisone, insulin glargine, insulin pen needle, levoxyl, losartan, metformin, multi-vitamin, mupirocin, novolog flexpen, omeprazole-sodium bicarbonate, order for dme, order for dme, simvastatin, cholecalciferol, and blood glucose.    ROS     ROS: 10 point ROS neg other than the symptoms noted above in the HPI.    Physical Exam   VS: /82 (BP Location: Right arm, Patient Position: Chair, Cuff Size: Adult Regular)   Pulse 82   Temp 98.2  F (36.8  C) (Oral)   Wt 107 kg (236 lb)   SpO2 96%   BMI 38.09 kg/m    GENERAL: AXOX3, NAD, well dressed, answering questions appropriately, appears stated age.  HEENT: no exopthalmous, no proptosis, no lig lag, no retraction  CV: RRR, no rubs, gallops, no murmurs  LUNGS: CTAB, no wheezes, rales, or ronchi  EXTREMITIES: trace edema at the ankles, feet with 2+ pulses, absent plantar sensation bilaterally.  Preulcerative/heavy callous below and to the side of the left great toe.  No redness or drainage noted.  NEUROLOGY: CN grossly intact, no tremors  MSK: grossly intact  SKIN: erythematous plaque noted within lower abdominal skin fold suspicious for candidal infection.    LABS:  A1c:   Component      Latest Ref Rng & Units 12/14/2018 8/29/2019 12/5/2019 3/12/2020   Hemoglobin A1C      0 - 5.6 % 11.8 (H) 11.6 (H) 11.7 (H) 9.9 (H)     Basic Metabolic Panel:  !COMPREHENSIVE Latest Ref Rng & Units 10/24/2019   SODIUM 133 - 144 mmol/L 129 (L)   POTASSIUM 3.4 - 5.3 mmol/L 5.0   CHLORIDE 94 - 109 mmol/L 98   BUN 7 - 30 mg/dL 17   Creatinine 0.66 - 1.25 mg/dL 1.09   Glucose 70 - 99 mg/dL 285 (H)   ANION GAP 3 - 14 mmol/L 5   CALCIUM 8.5 - 10.1 mg/dL 8.8     Component      Latest Ref Rng & Units 8/29/2019   Glucose 316   C-Peptide      0.9 - 6.9 ng/mL 2.5     LFTS/Cholesterol Panel:  !LIPID/HEPATIC Latest Ref Rng & Units  "8/29/2019   CHOLESTEROL <200 mg/dL 192   TRIGLYCERIDES <150 mg/dL 186 (H)   HDL CHOLESTEROL >39 mg/dL 34 (L)   LDL CHOLESTEROL, CALCULATED <100 mg/dL 121 (H)   VLDL-CHOLESTEROL 0 - 30 mg/dL    NON HDL CHOLESTEROL <130 mg/dL 158 (H)   CHOLESTEROL/HDL RATIO 0.0 - 5.0    AST 0 - 45 U/L 18   ALT 0 - 70 U/L 28     Thyroid Function:   !THYROID Latest Ref Rng & Units 3/5/2020 12/5/2019 12/14/2018   TSH 0.40 - 4.00 mU/L 0.55 5.20 (H) 2.19   T4 FREE 0.76 - 1.46 ng/dL  1.19      Component    Latest Ref Rng & Units 10/19/2018   Thyroid Peroxidase Antibody    <35 IU/mL 11   Thyroglobulin Antibody    <40 IU/mL <20   Thyroid Stim Immunog    <=1.3 TSI index <1.0     Urine MicroAlbumin:  Component      Latest Ref Rng & Units 3/5/2020   Creatinine Urine      mg/dL 18   Albumin Urine mg/L      mg/L 14   Albumin Urine mg/g Cr      0 - 17 mg/g Cr 81.46 (H)       Vital Signs 4/2/2019 4/17/2019 8/29/2019   Weight (LB) 223 lb 236 lb 243 lb 3.2 oz   Height 5' 5\"  5' 6\"   BMI (Calculated) 37.11  39.25       All pertinent notes, labs, and images personally reviewed by me.     A/P  Mr.Walter Morgan is a 59 year old here for the management of diabetes:    1. DM2 - Uncontrolled.  A1c improved 11.7--> 9.9%.    Dietary indiscretions contributing factor but also noted to have low insulin production based on a cpeptide of 2.5 with a glucose >300.  Diabetes is complicated by neuropathy and nephropathy/elevated urine microalbumin.    He has challenges with blood sugar testing due to limited use of his hands (neuropathy + tremor - both significant) and increasing difficulty with ADL's   Can't test his blood sugars using a meter due to the above.   He also has an extremely hard time inserting the Nina sensors due to neuropathy and tremor.    Continue Metformin XR 1000 mg daily.  Continue Lantus 32 units once daily  Change Novolog insulin to carb ratio to 1:9 and continue current correction 1:50>150.  Follow up with TOSHA Ramirez, 4/7 as previously " scheduled.    2. Hypothyroidism.  Currently treated with Levoxyl (KAMERON) 125 mcg/day.  Clinically and biochemically euthyroid. Continue Levoxyl 125 mcg/day.     3.  Hypertension - Variable.  Continue current antihypertensive therapy.    4. Hyperlipidemia - On statin therapy.    5. Other.  Redness and itchiness in the skin folds of lower abdominal area suspicious for candida.  Has been using hydrocortisone without benefit.  Start Nystatin cream bid to the affected area.  If no improvement, I recommend he follow up with his PCP for further evaluation/treatment.    Labs ordered today:   Orders Placed This Encounter   Procedures     Hemoglobin A1c       Radiology/Consults ordered today: None    More than 50% of the time spent with Mr. Morgan on counseling / coordinating his care discussing the above plan of care. The patient indicates understanding of the above issues and agrees with the plan set forth.  Total face to face time was 30 minutes.       Follow-up:  3 months with mahendra Butler NP  Endocrinology  [facility]  CC: Lisa Pierre

## 2020-03-12 NOTE — LETTER
"    3/12/2020         RE: Steve Morgan  [id number]  [facility] [address]        Dear Colleague,    Thank you for referring your patient, Steve Morgan, to the [facility]. Please see a copy of my visit note below.    Name: Steve \"Caio\" Cathy  F/u for Diabetes (Last seen 12/5/2019).  HPI:  Steve Morgan is a 60 year old male who presents for the management of:    1. Type 2 DM:  Orginally diagnosed at the age of 33 or 38.  Was prediabetic prior, was not particularly symptomatic at the time, was noted on routine lab work    Current Regimen: Metformin 1000 mg qd, Lantus 32 units every day - takes 34 units about 3x/week if bedtime glucose is elevated (started Lantus 5/2017). Novolog 1:10 + 1:50>150 (has a printed correction scale)  The plan was to start Trulicity but the copay was not affordable ($100 per refill).    Has been unable to tolerate a higher dose of Metformin due to diarrhea.  Still has some diarrhea at the 1000 mg/day dose.    BS checks: continuously via Nina  Nina Download: Average ; 55% above target range, 45% in target range.  0% below range.  BG target range .  Unable to use glucose meter - he has a hard time doing fingerstick testing due to his tremor.    Last diabetes ed appointment:  3/5/2020.      He requests a refill of hydrocortisone cream.  He states he has been applying this to a red area between the skin folds of his lower abdomen although without improvement.  He states this area is pruritic.  He denies noting any drainage or foul odor in this area.      Complications:   Diabetes Complications  Description / Detail    Diabetic Retinopathy  No retinopathy,last exam 2/2020, [facility]   CAD / PAD  No   Neuropathy  Yes + diabetic neuropathy: numbness hands and feet.  Saw podiatry, Dr. Mathis, 1/27/2018- using diabetic shoes   Nephropathy / Microalbuminuria  Yes, elevated urine microalbumin   Gastroparesis  No   Hypoglycemia [place]  Not sure, gets " 'kind of dizzy' when blood sugar is low but reports history of low blood sugars without symptoms     2. Hypertension: Blood Pressure today:   BP Readings from Last 3 Encounters:   03/12/20 128/82   02/11/20 138/80   01/16/20 128/71   .  Blood pressure medications include atenolol 25 mg qd and losartan 50 mg every day.   3. Hyperlipidemia: Takes simvastatin 20 mg for lipid control.       4. Prevention:  Flu Shot- 10/2019  Pneumovax- 2012  Opthalmology-Yes: annually  Dental-Yes: twice a year  ASA-Yes, 81 mg qd   Smoking- No  5.  Hypothyroidism. Currently treated with levoxyl (KAMERON) 125 mcg daily. Takes the levoxyl first thing in the morning and waits 30+ minutes before eating breakfast.  PA for Levoxyl approved through 2/18/2020.  PMH/PSH:  Past Medical History:   Diagnosis Date     Cellulitis and abscess of trunk 6/27/2017     Depression      Hyperlipidemia LDL goal <100 10/31/2010     Hypertension goal BP (blood pressure) < 140/90 3/17/2011     Hypothyroidism 1/12/2010     Morbid obesity due to excess calories (H) 1/12/2010     Neuropathy in diabetes (H) 1/12/2010     Obesity 1/12/2010     Sleep apnea 1/12/2010    CPAP     Type 2 diabetes, HbA1C goal < 8% (H) 3/8/2011     Past Surgical History:   Procedure Laterality Date     BIOPSY       COLONOSCOPY       EYE SURGERY       GENITOURINARY SURGERY      surg for undescended testicle     REPAIR HAMMER TOE BILATERAL  5/16/2013    Procedure: REPAIR HAMMER TOE BILATERAL;  Flexor Tenotomy Toes 2,3,4,5 Bilateral Feet;  Surgeon: Saad Bangura DPM;  Location: [facility] OR     Family Hx:  Family History   Problem Relation Age of Onset     Breast Cancer Mother      Hypertension Mother      Thyroid Disease Mother      Depression Mother      Alzheimer Disease Mother 82     Cancer - colorectal Father      Thyroid Disease Father      Depression Father      Other - See Comments Father         bladder polyps     Heart Disease Maternal Grandmother         CHF     Circulatory Paternal  Grandmother      Cancer Paternal Grandfather      Diabetes Brother      Diabetes Brother      Asthma Brother      Thyroid disease: Yes: mother         DM2: Yes: one brother with type 1 diabetes and one brother with type 2 diabetes, paternal aunt with DM2         Autoimmune: DM1, SLE, RA, Vitiligo Yes: brother with DM1    Social Hx:  Social History     Socioeconomic History     Marital status:      Spouse name: Sri     Number of children: 2     Years of education: Not on file     Highest education level: Associate degree: occupational, technical, or vocational program   Occupational History     Occupation:      Employer: NONE      Comment: [employer]   Social Needs     Financial resource strain: Not hard at all     Food insecurity     Worry: Never true     Inability: Never true     Transportation needs     Medical: No     Non-medical: No   Tobacco Use     Smoking status: Never Smoker     Smokeless tobacco: Never Used   Substance and Sexual Activity     Alcohol use: Yes     Frequency: Monthly or less     Drinks per session: 1 or 2     Binge frequency: Never     Comment: Occassionally     Drug use: No     Sexual activity: Not Currently     Partners: Female     Birth control/protection: Abstinence   Lifestyle     Physical activity     Days per week: 0 days     Minutes per session: 0 min     Stress: Not at all   Relationships     Social connections     Talks on phone: Twice a week     Gets together: Not on file     Attends Gnosticist service: Never     Active member of club or organization: Yes     Attends meetings of clubs or organizations: 1 to 4 times per year     Relationship status:      Intimate partner violence     Fear of current or ex partner: Not on file     Emotionally abused: Not on file     Physically abused: Not on file     Forced sexual activity: Not on file   Other Topics Concern     Parent/sibling w/ CABG, MI or angioplasty before 65F 55M? No   Social History Narrative     Not  on file          MEDICATIONS:  has a current medication list which includes the following prescription(s): albuterol, ammonium lactate, ammonium lactate, aspirin, atenolol, blood glucose, blood glucose monitoring, bupropion, calcium carb-cholecalciferol, citalopram, freestyle juan 14 day reader, freestyle juan 14 day sensor, cyanocobalamin, cyclobenzaprine, ferrous sulfate, finasteride, hydrocortisone, insulin glargine, insulin pen needle, levoxyl, losartan, metformin, multi-vitamin, mupirocin, novolog flexpen, omeprazole-sodium bicarbonate, order for dme, order for dme, simvastatin, cholecalciferol, and blood glucose.    ROS     ROS: 10 point ROS neg other than the symptoms noted above in the HPI.    Physical Exam   VS: /82 (BP Location: Right arm, Patient Position: Chair, Cuff Size: Adult Regular)   Pulse 82   Temp 98.2  F (36.8  C) (Oral)   Wt 107 kg (236 lb)   SpO2 96%   BMI 38.09 kg/m    GENERAL: AXOX3, NAD, well dressed, answering questions appropriately, appears stated age.  HEENT: no exopthalmous, no proptosis, no lig lag, no retraction  CV: RRR, no rubs, gallops, no murmurs  LUNGS: CTAB, no wheezes, rales, or ronchi  EXTREMITIES: trace edema at the ankles, feet with 2+ pulses, absent plantar sensation bilaterally.  Preulcerative/heavy callous below and to the side of the left great toe.  No redness or drainage noted.  NEUROLOGY: CN grossly intact, no tremors  MSK: grossly intact  SKIN: erythematous plaque noted within lower abdominal skin fold suspicious for candidal infection.    LABS:  A1c:   Component      Latest Ref Rng & Units 12/14/2018 8/29/2019 12/5/2019 3/12/2020   Hemoglobin A1C      0 - 5.6 % 11.8 (H) 11.6 (H) 11.7 (H) 9.9 (H)     Basic Metabolic Panel:  !COMPREHENSIVE Latest Ref Rng & Units 10/24/2019   SODIUM 133 - 144 mmol/L 129 (L)   POTASSIUM 3.4 - 5.3 mmol/L 5.0   CHLORIDE 94 - 109 mmol/L 98   BUN 7 - 30 mg/dL 17   Creatinine 0.66 - 1.25 mg/dL 1.09   Glucose 70 - 99 mg/dL 285  "(H)   ANION GAP 3 - 14 mmol/L 5   CALCIUM 8.5 - 10.1 mg/dL 8.8     Component      Latest Ref Rng & Units 8/29/2019   Glucose 316   C-Peptide      0.9 - 6.9 ng/mL 2.5     LFTS/Cholesterol Panel:  !LIPID/HEPATIC Latest Ref Rng & Units 8/29/2019   CHOLESTEROL <200 mg/dL 192   TRIGLYCERIDES <150 mg/dL 186 (H)   HDL CHOLESTEROL >39 mg/dL 34 (L)   LDL CHOLESTEROL, CALCULATED <100 mg/dL 121 (H)   VLDL-CHOLESTEROL 0 - 30 mg/dL    NON HDL CHOLESTEROL <130 mg/dL 158 (H)   CHOLESTEROL/HDL RATIO 0.0 - 5.0    AST 0 - 45 U/L 18   ALT 0 - 70 U/L 28     Thyroid Function:   !THYROID Latest Ref Rng & Units 3/5/2020 12/5/2019 12/14/2018   TSH 0.40 - 4.00 mU/L 0.55 5.20 (H) 2.19   T4 FREE 0.76 - 1.46 ng/dL  1.19      Component    Latest Ref Rng & Units 10/19/2018   Thyroid Peroxidase Antibody    <35 IU/mL 11   Thyroglobulin Antibody    <40 IU/mL <20   Thyroid Stim Immunog    <=1.3 TSI index <1.0     Urine MicroAlbumin:  Component      Latest Ref Rng & Units 3/5/2020   Creatinine Urine      mg/dL 18   Albumin Urine mg/L      mg/L 14   Albumin Urine mg/g Cr      0 - 17 mg/g Cr 81.46 (H)       Vital Signs 4/2/2019 4/17/2019 8/29/2019   Weight (LB) 223 lb 236 lb 243 lb 3.2 oz   Height 5' 5\"  5' 6\"   BMI (Calculated) 37.11  39.25       All pertinent notes, labs, and images personally reviewed by me.     A/P  Mr.Walter Morgan is a 59 year old here for the management of diabetes:    1. DM2 - Uncontrolled.  A1c improved 11.7--> 9.9%.    Dietary indiscretions contributing factor but also noted to have low insulin production based on a cpeptide of 2.5 with a glucose >300.  Diabetes is complicated by neuropathy and nephropathy/elevated urine microalbumin.    He has challenges with blood sugar testing due to limited use of his hands (neuropathy + tremor - both significant) and increasing difficulty with ADL's   Can't test his blood sugars using a meter due to the above.   He also has an extremely hard time inserting the Nina sensors due to " neuropathy and tremor.    Continue Metformin XR 1000 mg daily.  Continue Lantus 32 units once daily  Change Novolog insulin to carb ratio to 1:9 and continue current correction 1:50>150.  Follow up with TOSHA Ramirez, 4/7 as previously scheduled.    2. Hypothyroidism.  Currently treated with Levoxyl (KAMERON) 125 mcg/day.  Clinically and biochemically euthyroid. Continue Levoxyl 125 mcg/day.     3.  Hypertension - Variable.  Continue current antihypertensive therapy.    4. Hyperlipidemia - On statin therapy.    5. Other.  Redness and itchiness in the skin folds of lower abdominal area suspicious for candida.  Has been using hydrocortisone without benefit.  Start Nystatin cream bid to the affected area.  If no improvement, I recommend he follow up with his PCP for further evaluation/treatment.    Labs ordered today:   Orders Placed This Encounter   Procedures     Hemoglobin A1c       Radiology/Consults ordered today: None    More than 50% of the time spent with Mr. Morgan on counseling / coordinating his care discussing the above plan of care. The patient indicates understanding of the above issues and agrees with the plan set forth.  Total face to face time was 30 minutes.       Follow-up:  3 months with me    Diana Butler NP  Endocrinology  [facility]  CC: Lisa Pierre              Again, thank you for allowing me to participate in the care of your patient.        Sincerely,        BRIDGET Payne CNP

## 2020-03-12 NOTE — PATIENT INSTRUCTIONS
Component      Latest Ref Rng & Units 12/14/2018 8/29/2019 12/5/2019 3/12/2020   Hemoglobin A1C      0 - 5.6 % 11.8 (H) 11.6 (H) 11.7 (H) 9.9 (H)     I would like you to start using a 1 unit per 9 grams of carbohdrate ratio.  Continue using the same correction scale Lucía gave you.    You have an appointment with Lucía Chávez on Tuesday April 7 at 1 pm.  Try to enter all insulin doses into either the Temptster reader OR use My Fitness Pal for one week before your appointment with Lucía - start entering the insulin doses 3/31/2020.    Follow up with me again in 3 months.    Diana Butler NP  Endocrinology

## 2020-03-23 ENCOUNTER — TELEPHONE (OUTPATIENT)
Dept: PODIATRY | Facility: CLINIC | Age: 60
End: 2020-03-23

## 2020-03-23 NOTE — TELEPHONE ENCOUNTER
Called patient and left a voicemail to call back [facility] regarding his appointment scheduled with Dr Whitehead on April 1st.    After chart review Dr Whitehead has asked that he reschedule his appointment to on or after April 20th due to the COVID19 protocol.    ** If he has concerns for a diabetic foot ulcer, he should be seen and a call can be routed to our triage team at [phone] first.

## 2020-03-24 ENCOUNTER — MYC REFILL (OUTPATIENT)
Dept: PODIATRY | Facility: CLINIC | Age: 60
End: 2020-03-24

## 2020-03-24 DIAGNOSIS — Z71.84 ENCOUNTER FOR COUNSELING FOR TRAVEL: ICD-10-CM

## 2020-03-24 DIAGNOSIS — L84 PRE-ULCERATIVE CALLUSES: ICD-10-CM

## 2020-03-24 DIAGNOSIS — E11.42 DIABETIC POLYNEUROPATHY ASSOCIATED WITH TYPE 2 DIABETES MELLITUS (H): ICD-10-CM

## 2020-03-24 RX ORDER — AMMONIUM LACTATE 12 G/100G
CREAM TOPICAL DAILY
Qty: 385 G | Refills: 2 | Status: ON HOLD | OUTPATIENT
Start: 2020-03-24 | End: 2022-12-23

## 2020-03-24 NOTE — TELEPHONE ENCOUNTER
Please call patient to verify that he requested this. Is it for travel again?    London Escalera PA-C on 3/24/2020 at 11:12 AM

## 2020-03-24 NOTE — TELEPHONE ENCOUNTER
Talked to patient and rescheduled patient with Dr. Whitehead for May 6th in the [facility].    Jaden Helm MA on 3/24/2020 at 5:03 PM

## 2020-03-26 RX ORDER — AZITHROMYCIN 500 MG/1
500 TABLET, FILM COATED ORAL DAILY
Qty: 3 TABLET | Refills: 0 | Status: SHIPPED | OUTPATIENT
Start: 2020-03-26 | End: 2020-05-20

## 2020-03-27 NOTE — TELEPHONE ENCOUNTER
Left detail message on NERY Ricardo/CMA  [place]

## 2020-03-31 ENCOUNTER — TELEPHONE (OUTPATIENT)
Dept: FAMILY MEDICINE | Facility: CLINIC | Age: 60
End: 2020-03-31

## 2020-03-31 NOTE — TELEPHONE ENCOUNTER
Patient Quality Outreach Summary      Summary:    Patient is due/failing the following:   Eye Exam    Type of outreach:    Type of outreach:  none per pt last eye exam 2/2020 per chart        Questions for provider review:    None                                                                                                                    ANNELIESE Maki       Chart routed to Care Team.

## 2020-04-04 ENCOUNTER — MYC MEDICAL ADVICE (OUTPATIENT)
Dept: ENDOCRINOLOGY | Facility: CLINIC | Age: 60
End: 2020-04-04

## 2020-04-06 ENCOUNTER — TELEPHONE (OUTPATIENT)
Dept: ENDOCRINOLOGY | Facility: CLINIC | Age: 60
End: 2020-04-06

## 2020-04-06 NOTE — TELEPHONE ENCOUNTER
Fax from [facility], BRAND NAME LEVOXYL NO LONGER COVERED BY INSURANCE, NEEDS OR OR RX CHANGE, no PA insurance information provided on fax, routed to , please advise plan    Lucía Bond RN, BSN  Message handled by Nurse Triage.

## 2020-04-07 ENCOUNTER — TELEPHONE (OUTPATIENT)
Dept: FAMILY MEDICINE | Facility: CLINIC | Age: 60
End: 2020-04-07

## 2020-04-07 ENCOUNTER — ALLIED HEALTH/NURSE VISIT (OUTPATIENT)
Dept: EDUCATION SERVICES | Facility: CLINIC | Age: 60
End: 2020-04-07
Payer: MEDICARE

## 2020-04-07 DIAGNOSIS — Z53.9 NO SHOW: Primary | ICD-10-CM

## 2020-04-07 NOTE — TELEPHONE ENCOUNTER
Please submit PA -   Dx - acquired hypothyroidism due to atrophy   Medical justification for name brand Levoxyl - hypothyroidism uncontrolled on generic levothyroxine since 2015 - thyroid function tests chronically abnormal and uncontrolled on generic levothyroxine despite frequent dose adjustments.  Name brand Levoxyl is ongoing therapy, not new initiation.  Thyroid levels/hypothyroidism has been controlled and stable since starting Levoxyl.  Recommend he continue.  Diana Butler NP  Endocrinology

## 2020-04-07 NOTE — TELEPHONE ENCOUNTER
Routed to [facility] PA pool, please start PA, see endo notes  Lucía Bond, RN, BSN  Message handled by Nurse Triage.

## 2020-04-07 NOTE — TELEPHONE ENCOUNTER
Central Prior Authorization Team   Phone: [phone]      Prior Authorization Approval    Authorization Effective Date: 1/8/2020  Authorization Expiration Date: 4/7/2021  Medication: levoxyl - APPROVED  Approved Dose/Quantity: 90 FOR 90  Reference #:     Insurance Company: [employer] - Phone [phone] Fax [phone]  Expected CoPay:       CoPay Card Available:      Foundation Assistance Needed:    Which Pharmacy is filling the prescription (Not needed for infusion/clinic administered): [id number] - [place], [place] - [address].  Pharmacy Notified: Yes  Patient Notified: Yes (**Instructed pharmacy to notify patient when script is ready to /ship.**)

## 2020-04-07 NOTE — TELEPHONE ENCOUNTER
Patient called stating he was waiting to hear form Lcuía Chávez regarding his 1:00pm phone visit.  Please call patient and advise if appointment needs to be rescheduled.    Erlinda Wells

## 2020-04-07 NOTE — TELEPHONE ENCOUNTER
Central Prior Authorization Team   Phone: [phone]      PA Initiation    Medication: levoxyl - INITIATED  Insurance Company: [employer] - Phone [phone] Fax [phone]  Pharmacy Filling the Rx: [id number] - [place], [place] - [address].  Filling Pharmacy Phone: [phone]  Filling Pharmacy Fax: [phone]  Start Date: 4/7/2020

## 2020-04-07 NOTE — PROGRESS NOTES
Unable to reach patient. Left voice mail, also sending T[employer]or[employer] message.    Lucía Chávez RD, CDE  Diabetes

## 2020-04-07 NOTE — TELEPHONE ENCOUNTER
Had attempted to reach patient at 1:00, but apparently must have dialed home phone# instead of mobile phone#. Reached patient to re-schedule for Thus, 4/9 at 4:00. Apologized for the confusion. Pt very understanding.    Lucía Chávez RD, CDE  Diabetes

## 2020-04-07 NOTE — LETTER
4/7/2020         RE: Steve Morgan  [id number]  [facility] [address]        Dear Colleague,    Thank you for referring your patient, Steve Morgan, to the [facility]. Please see a copy of my visit note below.    Unable to reach patient. Left voice mail, also sending [email or web address] message.    Lucía Chávez RD, CDE  Diabetes     This patient was a no show for this scheduled appointment.

## 2020-04-09 ENCOUNTER — ALLIED HEALTH/NURSE VISIT (OUTPATIENT)
Dept: EDUCATION SERVICES | Facility: CLINIC | Age: 60
End: 2020-04-09
Payer: MEDICARE

## 2020-04-09 DIAGNOSIS — Z79.4 TYPE 2 DIABETES MELLITUS WITH DIABETIC NEUROPATHY, WITH LONG-TERM CURRENT USE OF INSULIN (H): ICD-10-CM

## 2020-04-09 DIAGNOSIS — E11.40 TYPE 2 DIABETES MELLITUS WITH DIABETIC NEUROPATHY, WITH LONG-TERM CURRENT USE OF INSULIN (H): ICD-10-CM

## 2020-04-09 PROCEDURE — G0108 DIAB MANAGE TRN  PER INDIV: HCPCS | Mod: 95 | Performed by: DIETITIAN, REGISTERED

## 2020-04-09 RX ORDER — INSULIN ASPART 100 [IU]/ML
INJECTION, SOLUTION INTRAVENOUS; SUBCUTANEOUS
Qty: 45 ML | Refills: 3
Start: 2020-04-09 | End: 2021-06-04

## 2020-04-09 NOTE — PATIENT INSTRUCTIONS
Continue current insulin doses for now.     Recommend do not skip your Novolog injection if you are at the lower end of blood sugar target. However, It is ok to reduce your dose as we discussed, if you are concerned about going low.     Recommend do not take Novolog for a snack close to bedtime. Use moderation if having a snack in the evening.     It was a pleasure talking with you today. You are doing a great job!

## 2020-04-09 NOTE — LETTER
"    4/9/2020         RE: Steve Morgan  [address]  [facility] [address]        Dear Colleague,    Thank you for referring your patient, Steve Morgan, to the [facility]. Please see a copy of my visit note below.    Diabetes Self-Management Education & Support    Presents for: Follow-up    Patient verbally consented to the telephone visit service today: yes    SUBJECTIVE/OBJECTIVE:  Presents for: Follow-up  Accompanied by: Self  Diabetes education in the past 24mo: Yes  Focus of Visit: CGM, Taking Medication, Healthy Eating  Diabetes type: Type 2  Disease course: Improving  How confident are you filling out medical forms by yourself:: Not Assessed  Transportation concerns: No  Other concerns:: Physical impairment  Cultural Influences/Ethnic Background:  American      Diabetes Symptoms & Complications:  Fatigue: Sometimes  Neuropathy: No  Polydipsia: Yes  Polyphagia: Yes  Polyuria: No  Visual change: No  Slow healing wounds: Yes  Autonomic neuropathy: No  CVA: No  Heart disease: No  Nephropathy: No  Peripheral neuropathy: Yes  Peripheral Vascular Disease: No  Retinopathy: No  Sexual dysfunction: Yes    Patient Problem List and Family Medical History reviewed for relevant medical history, current medical status, and diabetes risk factors.    Vitals:  There were no vitals taken for this visit.  Estimated body mass index is 38.09 kg/m  as calculated from the following:    Height as of 2/11/20: 1.676 m (5' 6\").    Weight as of 3/12/20: 107 kg (236 lb).   Last 3 BP:   BP Readings from Last 3 Encounters:   03/12/20 128/82   02/11/20 138/80   01/16/20 128/71       History   Smoking Status     Never Smoker   Smokeless Tobacco     Never Used       Labs:  Lab Results   Component Value Date    A1C 9.9 03/12/2020     Lab Results   Component Value Date     01/16/2020     Lab Results   Component Value Date    LDL 88 03/05/2020     HDL Cholesterol   Date Value Ref Range Status "   03/05/2020 33 (L) >39 mg/dL Final   ]  GFR Estimate   Date Value Ref Range Status   01/16/2020 79 >60 mL/min/[1.73_m2] Final     Comment:     Non  GFR Calc  Starting 12/18/2018, serum creatinine based estimated GFR (eGFR) will be   calculated using the Chronic Kidney Disease Epidemiology Collaboration   (CKD-EPI) equation.       GFR Estimate If Black   Date Value Ref Range Status   01/16/2020 >90 >60 mL/min/[1.73_m2] Final     Comment:      GFR Calc  Starting 12/18/2018, serum creatinine based estimated GFR (eGFR) will be   calculated using the Chronic Kidney Disease Epidemiology Collaboration   (CKD-EPI) equation.       Lab Results   Component Value Date    CR 1.01 01/16/2020     No results found for: MICROALBUMIN    Healthy Eating:  Healthy Eating Assessed Today: Yes  Cultural/Tenriism diet restrictions?: No  Meal planning/habits: Avoiding sweets, Calorie counting, Carb counting, Smaller portions, Keeps food records  How many times a week on average do you eat food made away from home (restaurant/take-out)?: 2  Meals include: Lunch, Dinner, Afternoon Snack, Evening Snack  Beverages: Water, Diet soda  Has patient met with a dietitian in the past?: No    Being Active:  Being Active Assessed Today: Yes  Exercise:: Currently not exercising  Barrier to exercise: Other    Monitoring:  Monitoring Assessed Today: Yes  Blood Glucose Meter: CGM  Times checking blood sugar at home (number): 5+  Times checking blood sugar at home (per): Day  Blood glucose trend: Decreasing    Taking Medications:  Diabetes Medication(s)     Biguanides       metFORMIN (GLUCOPHAGE-XR) 500 MG 24 hr tablet    TAKE 2 TABLETS (1,000 MG) BY MOUTH DAILY (WITH DINNER)    Insulin       insulin glargine (LANTUS SOLOSTAR) 100 UNIT/ML pen    32 units qd.  Increase as directed to max dose of 45 units qd.     NOVOLOG FLEXPEN 100 UNIT/ML soln    Use 1 unit per 10 gms of carb + correction 1:50 above 150 mg/dl. Titrate as  directed up to 45 units per day. Using 1 unit per 9 gms of carbs.        Taking Medication Assessed Today: Yes  Current Treatments: Diet, Insulin Injections, Oral Medication (taken by mouth)  Given by: Patient  Problems taking diabetes medications regularly?: No  Diabetes medication side effects?: No    Problem Solving:  Problem Solving Assessed Today: Yes  Is the patient at risk for hypoglycemia?: Yes  Hypoglycemia Frequency: Never    Reducing Risks:  Reducing Risks Assessed Today: No  CAD Risks: Diabetes Mellitus, Dyslipidemia, Family history, Hypertension, Obesity, Sedentary lifestyle  Has dilated eye exam at least once a year?: Yes  Sees dentist every 6 months?: Yes  Feet checked by healthcare provider in the last year?: Yes    Healthy Coping:  Healthy Coping Assessed Today: Yes  Emotional response to diabetes: Concern for health and well-being  Informal Support system:: Children, Family, Spouse  Stage of change: ACTION (Actively working towards change)  Patient Activation Measure Survey Score:  SHANNON Score (Last Two) 3/30/2017 1/15/2020   SHANNON Raw Score 29 36   Activation Score 52.9 75.5   SHANNON Level 2 4       Diabetes knowledge and skills assessment:   Patient is knowledgeable in diabetes management concepts related to: Healthy Eating, Being Active, Monitoring, Taking Medication, Problem Solving, Reducing Risks and Healthy Coping    Patient needs further education on the following diabetes management concepts: Healthy Eating, Taking Medication and Problem Solving    Based on learning assessment above, most appropriate setting for further diabetes education would be: Individual setting.      REPORTS:                    INTERVENTION:  Education provided today on:  AADE Self-Care Behaviors:  Healthy Eating: carbohydrate counting  Taking Medication: action of prescribed medication  Problem Solving: high blood glucose - causes, signs/symptoms, treatment and prevention, low blood glucose - causes, signs/symptoms,  "treatment and prevention and when to call health care provider    CGM-specific education:   Use of trends and graphs for pattern management and problem solving and Dosing insulin based on sensor glucose results    Pt verbalized understanding of concepts discussed and recommendations provided today.       Education Materials Provided:  No new materials provided today      ASSESSMENT:  Noted significant BG improvement since starting Novolog and use of I:C dosing, pt states \"I'm pleasantly surprised\". Pt is working on carb counting accuracy, states \"it's sometimes a challenge\". Also states that he has skipped some Novolog injections if BG is less than 100 mg/dl due to fear of \"going low\". Recommend avoid missed injections, but ok to reduce dose if at lower end of target. If BG below target, can treat with 15 gms of carbs first, then have meal with the usual insulin to carb dose. Pt verbalized understanding.     Glucose Patterns & Trends: no clear pattern identified, intermittent downward  trend over-night, discussed bedtime snack if BG less than 120 mg/dl. Pt asking if he should take Novolog before evening snack of crackers, recommend do not take Novolog close to bedtime, use moderation with evening snack.      PLAN  See Patient Instructions for co-developed, patient-stated behavior change goals.  AVS printed and provided to patient today. See Follow-Up section for recommended follow-up.    Lucía Chávez RD, CDE  Diabetes     Time Spent: 30 minutes  Encounter Type: Individual Telephone    Any diabetes medication dose changes were made via the CDE Protocol and Collaborative Practice Agreement with the patient's endocrinology provider. A copy of this encounter was shared with the provider.        "

## 2020-04-09 NOTE — PROGRESS NOTES
"Diabetes Self-Management Education & Support    Presents for: Follow-up    Patient verbally consented to the telephone visit service today: yes    SUBJECTIVE/OBJECTIVE:  Presents for: Follow-up  Accompanied by: Self  Diabetes education in the past 24mo: Yes  Focus of Visit: CGM, Taking Medication, Healthy Eating  Diabetes type: Type 2  Disease course: Improving  How confident are you filling out medical forms by yourself:: Not Assessed  Transportation concerns: No  Other concerns:: Physical impairment  Cultural Influences/Ethnic Background:  American      Diabetes Symptoms & Complications:  Fatigue: Sometimes  Neuropathy: No  Polydipsia: Yes  Polyphagia: Yes  Polyuria: No  Visual change: No  Slow healing wounds: Yes  Autonomic neuropathy: No  CVA: No  Heart disease: No  Nephropathy: No  Peripheral neuropathy: Yes  Peripheral Vascular Disease: No  Retinopathy: No  Sexual dysfunction: Yes    Patient Problem List and Family Medical History reviewed for relevant medical history, current medical status, and diabetes risk factors.    Vitals:  There were no vitals taken for this visit.  Estimated body mass index is 38.09 kg/m  as calculated from the following:    Height as of 2/11/20: 1.676 m (5' 6\").    Weight as of 3/12/20: 107 kg (236 lb).   Last 3 BP:   BP Readings from Last 3 Encounters:   03/12/20 128/82   02/11/20 138/80   01/16/20 128/71       History   Smoking Status     Never Smoker   Smokeless Tobacco     Never Used       Labs:  Lab Results   Component Value Date    A1C 9.9 03/12/2020     Lab Results   Component Value Date     01/16/2020     Lab Results   Component Value Date    LDL 88 03/05/2020     HDL Cholesterol   Date Value Ref Range Status   03/05/2020 33 (L) >39 mg/dL Final   ]  GFR Estimate   Date Value Ref Range Status   01/16/2020 79 >60 mL/min/[1.73_m2] Final     Comment:     Non  GFR Calc  Starting 12/18/2018, serum creatinine based estimated GFR (eGFR) will be   calculated " using the Chronic Kidney Disease Epidemiology Collaboration   (CKD-EPI) equation.       GFR Estimate If Black   Date Value Ref Range Status   01/16/2020 >90 >60 mL/min/[1.73_m2] Final     Comment:      GFR Calc  Starting 12/18/2018, serum creatinine based estimated GFR (eGFR) will be   calculated using the Chronic Kidney Disease Epidemiology Collaboration   (CKD-EPI) equation.       Lab Results   Component Value Date    CR 1.01 01/16/2020     No results found for: MICROALBUMIN    Healthy Eating:  Healthy Eating Assessed Today: Yes  Cultural/Religion diet restrictions?: No  Meal planning/habits: Avoiding sweets, Calorie counting, Carb counting, Smaller portions, Keeps food records  How many times a week on average do you eat food made away from home (restaurant/take-out)?: 2  Meals include: Lunch, Dinner, Afternoon Snack, Evening Snack  Beverages: Water, Diet soda  Has patient met with a dietitian in the past?: No    Being Active:  Being Active Assessed Today: Yes  Exercise:: Currently not exercising  Barrier to exercise: Other    Monitoring:  Monitoring Assessed Today: Yes  Blood Glucose Meter: CGM  Times checking blood sugar at home (number): 5+  Times checking blood sugar at home (per): Day  Blood glucose trend: Decreasing    Taking Medications:  Diabetes Medication(s)     Biguanides       metFORMIN (GLUCOPHAGE-XR) 500 MG 24 hr tablet    TAKE 2 TABLETS (1,000 MG) BY MOUTH DAILY (WITH DINNER)    Insulin       insulin glargine (LANTUS SOLOSTAR) 100 UNIT/ML pen    32 units qd.  Increase as directed to max dose of 45 units qd.     NOVOLOG FLEXPEN 100 UNIT/ML soln    Use 1 unit per 10 gms of carb + correction 1:50 above 150 mg/dl. Titrate as directed up to 45 units per day. Using 1 unit per 9 gms of carbs.        Taking Medication Assessed Today: Yes  Current Treatments: Diet, Insulin Injections, Oral Medication (taken by mouth)  Given by: Patient  Problems taking diabetes medications regularly?:  No  Diabetes medication side effects?: No    Problem Solving:  Problem Solving Assessed Today: Yes  Is the patient at risk for hypoglycemia?: Yes  Hypoglycemia Frequency: Never    Reducing Risks:  Reducing Risks Assessed Today: No  CAD Risks: Diabetes Mellitus, Dyslipidemia, Family history, Hypertension, Obesity, Sedentary lifestyle  Has dilated eye exam at least once a year?: Yes  Sees dentist every 6 months?: Yes  Feet checked by healthcare provider in the last year?: Yes    Healthy Coping:  Healthy Coping Assessed Today: Yes  Emotional response to diabetes: Concern for health and well-being  Informal Support system:: Children, Family, Spouse  Stage of change: ACTION (Actively working towards change)  Patient Activation Measure Survey Score:  SHANNON Score (Last Two) 3/30/2017 1/15/2020   SHANNON Raw Score 29 36   Activation Score 52.9 75.5   SHANNON Level 2 4       Diabetes knowledge and skills assessment:   Patient is knowledgeable in diabetes management concepts related to: Healthy Eating, Being Active, Monitoring, Taking Medication, Problem Solving, Reducing Risks and Healthy Coping    Patient needs further education on the following diabetes management concepts: Healthy Eating, Taking Medication and Problem Solving    Based on learning assessment above, most appropriate setting for further diabetes education would be: Individual setting.      REPORTS:                    INTERVENTION:  Education provided today on:  AADE Self-Care Behaviors:  Healthy Eating: carbohydrate counting  Taking Medication: action of prescribed medication  Problem Solving: high blood glucose - causes, signs/symptoms, treatment and prevention, low blood glucose - causes, signs/symptoms, treatment and prevention and when to call health care provider    CGM-specific education:   Use of trends and graphs for pattern management and problem solving and Dosing insulin based on sensor glucose results    Pt verbalized understanding of concepts discussed  "and recommendations provided today.       Education Materials Provided:  No new materials provided today      ASSESSMENT:  Noted significant BG improvement since starting Novolog and use of I:C dosing, pt states \"I'm pleasantly surprised\". Pt is working on carb counting accuracy, states \"it's sometimes a challenge\". Also states that he has skipped some Novolog injections if BG is less than 100 mg/dl due to fear of \"going low\". Recommend avoid missed injections, but ok to reduce dose if at lower end of target. If BG below target, can treat with 15 gms of carbs first, then have meal with the usual insulin to carb dose. Pt verbalized understanding.     Glucose Patterns & Trends: no clear pattern identified, intermittent downward  trend over-night, discussed bedtime snack if BG less than 120 mg/dl. Pt asking if he should take Novolog before evening snack of crackers, recommend do not take Novolog close to bedtime, use moderation with evening snack.      PLAN  See Patient Instructions for co-developed, patient-stated behavior change goals.  AVS printed and provided to patient today. See Follow-Up section for recommended follow-up.    Lucía Chávez RD, CDE  Diabetes     Time Spent: 30 minutes  Encounter Type: Individual Telephone    Any diabetes medication dose changes were made via the CDE Protocol and Collaborative Practice Agreement with the patient's endocrinology provider. A copy of this encounter was shared with the provider.      "

## 2020-04-30 ENCOUNTER — MEDICAL CORRESPONDENCE (OUTPATIENT)
Dept: HEALTH INFORMATION MANAGEMENT | Facility: CLINIC | Age: 60
End: 2020-04-30

## 2020-05-11 ENCOUNTER — OFFICE VISIT (OUTPATIENT)
Dept: PODIATRY | Facility: CLINIC | Age: 60
End: 2020-05-11
Attending: CLINICAL NURSE SPECIALIST
Payer: MEDICARE

## 2020-05-11 VITALS
HEIGHT: 66 IN | SYSTOLIC BLOOD PRESSURE: 132 MMHG | DIASTOLIC BLOOD PRESSURE: 78 MMHG | BODY MASS INDEX: 37.77 KG/M2 | WEIGHT: 235 LBS

## 2020-05-11 DIAGNOSIS — E11.42 DIABETIC POLYNEUROPATHY ASSOCIATED WITH TYPE 2 DIABETES MELLITUS (H): Primary | ICD-10-CM

## 2020-05-11 DIAGNOSIS — E11.621 DIABETIC ULCER OF LEFT FOOT ASSOCIATED WITH TYPE 2 DIABETES MELLITUS, LIMITED TO BREAKDOWN OF SKIN, UNSPECIFIED PART OF FOOT (H): ICD-10-CM

## 2020-05-11 DIAGNOSIS — L97.521 DIABETIC ULCER OF LEFT FOOT ASSOCIATED WITH TYPE 2 DIABETES MELLITUS, LIMITED TO BREAKDOWN OF SKIN, UNSPECIFIED PART OF FOOT (H): ICD-10-CM

## 2020-05-11 PROCEDURE — 97597 DBRDMT OPN WND 1ST 20 CM/<: CPT | Performed by: PODIATRIST

## 2020-05-11 PROCEDURE — 99203 OFFICE O/P NEW LOW 30 MIN: CPT | Mod: 25 | Performed by: PODIATRIST

## 2020-05-11 ASSESSMENT — MIFFLIN-ST. JEOR: SCORE: 1808.7

## 2020-05-11 NOTE — PATIENT INSTRUCTIONS
"[facility]  [address]  [place] [address]  Phone: [phone]  Fax: [phone]    Follow up: as needed    [facility]  [facility]  [address]  [place] [address]  Phone: [phone]  Fax: [phone] [facility]  [address]  [place] [address]  Phone: [phone]   Fax: [phone]   [facility]  [address]  [place] [address]  Phone: [phone]  Fax: [phone] [facility]  [address]  [place] [address]  Phone: [phone]   Fax: [phone]   [facility]   [address]  [place] [address]  Phone: [phone]  Fax: [phone] * Please call any location listed to make an appointment for a casting/fitting. Your referral was sent to their central office and they will all have the order on file.       DIABETES AND YOUR FEET  Diabetes can result in several problems in the feet including ulcers (open sores) and amputations. Two of the most important reasons why people develop foot problems when they have diabetes is : 1. Neuropathy (loss of feeling)  2. Vascular disease (loss or decrease of blood flow).    Neuropathy is a term used to describe a loss of nerve function.  Patients with diabetes are at risk of developing neuropathy if their sugars continue to run high and are above the normal value. One theory for neuropathy is that the \"extra\" sugar in the body enters the nerves and is broken down. These by-products build up in the nerve causing it to swell and impairing nerve function. Often times, this can be prevented by controlling your sugars, dieting and exercise.    When a person develops neuropathy, they usually begin to feel numbness or tingling in their feet and sometime in their legs.  Other symptoms may include painful burning or hot feet, tingling or feeling like " insects or ants are crawling on your feet or legs.  If the diabetes is sever and the sugars run high for long periods of time, neuropathy can also occur in the hands.    Vascular disease  is a term used to describe a loss or decrease in circulation (blood flow). There is a problem in getting blood and oxygen to areas that need it. Similar to neuropathy, sugars can build up in the walls of the arteries (blood vessels) and cause them to become swollen, thickened and hardened. This decreases the amount of blood that can go to an area that needs it. Though this is common in the legs of diabetic patients, it can also affect other arteries (blood vessels) in the body such as in the heart and eyes.    In the legs, vascular disease usually results in cramping. Patients who develop leg cramps after walking the same distance every time (i.e. One block, half a mile, ect.) need to let their doctors know so that their circulation may be checked. Cramps causing severe pain in the feet and/or legs while sleeping and the cramps go away when you stand or hang your legs off the side of the bed, may also be a sign of poor blood circulation.  Occasional cramping in cold weather or on rare occasions with activity may not be due to poor circulation, but you should inform your doctor.    PREVENTION OF THESE DISEASES  The key to prevention is good blood sugar control. Poor blood sugar control is a big reason many of these problems start. Physical activity (exercise) is a very good way to help decrease your blood sugars. Exercise can lower your blood sugar, blood pressure, and cholesterol. It also reduces your risk for heart disease and stroke, relieves stress, and strengthens your heart, muscles and bones.  In addition, regular activity helps insulin work better, improves your blood circulation, and keeps your joints flexible. If you're trying to lose weight, a combination of exercise and wise food choices can help you reach your target  weight and maintain it.      PAIN MANAGEMENT  1.Blood Sugar Control - Most important  2. Medications such as:  Amytriptylline, duloxetine, gabapentin, lyrica, tramadol  3. Nutritional therapy:  Vitamin B6 (100mg daily), Vitamin B12 (75mcg daily), Vitamin D 2000 IU daily), Alpha-Lipoic Acid (600-1800mg daily), Acetyl-L-Carnitine (500-1000mg TID, L-methyl folate (1500mcg daily)    ** Metformin can block Vitamin B6 and B12 so it is important to supplement**    FOOT CARE RECOMMENDATIONS   1. Wash your feet with lukewarm water and a mild soap and then dry them thoroughly, especially between the toes.     2. Examine your feet daily looking for cuts, corns, blisters, cracks, ect, especially after wearing new shoes. Make sure to look between your toes. If you cannot see the bottom of your feet, set a mirror on the floor and hold your foot over it, or ask a spouse, friend or family member to examine your feet for you. Contact your doctor immediately if new problems are noted or if sores are not healing.     3. Immediately apply moisturizer to the tops and bottoms of your feet, avoiding areas between the toes. Hand lotion (Intesive Care, Karla, Eucerin, Neutrogena, Curel, ect) is sufficient unless your doctor prescribes a medicated lotion. Apply sunscreen to your feet when going swimming outside.     4. Use clean comfortable shoes, wear white socks (if you have any bleeding or drainage, you will see it on white socks). Socks should not have thick seams or cut off the circulation around the leg. Break in new shoes slowly and rotate with older shoes until broken in. Check the inside of your shoes with your hand to look for areas of irritation or objects that may have fallen into your shoes.       5. Keep slippers by the side of your bed for use during the night.     6.  Shoes should be fitted by a professional and should not cause areas of irritation.  Check your feet regularly when wearing a new pair of shoes and replace them  as needed.     7.  Talk to your doctor about proper exercise. Exercise and stretching stimulate blood flow to your feet and maintain proper glucose levels.     8.  Monitor your blood glucose level as instructed by your doctor. Notify your doctor immediately if your blood sugar is abnormally high or low.    9. Cut your nails straight across, but then gently round any sharp edges with a cardboard nail file. If you have neuropathy, peripheral vascular disease or cannot see that well to trim your own toenails contact [employer] ([phone]) or [employer] ([phone]).      THINGS TO AVOID DOING   1.  Do not soak your feet if you have an open sore. Use only lukewarm water and always check the temperature with your hand as hot water can easily burn your feet.       2.  Never use a hot water bottle or heating pad on your feet. Also do not apply cold compresses to your feet. With decreased sensation, you could burn or freeze your feet.       3.  Do not apply any of these to your feet:    -  Over the counter medicine for corns or warts    -  Harsh chemicals like boric acid    -  Do not self-treat corns, cuts, blisters or infections. Always consult your doctor.       4.  Do not wear sandals, slippers or walk barefoot, especially on hot sand or concrete or other harsh surfaces.     5.  If you smoke, stop!!!

## 2020-05-11 NOTE — PROGRESS NOTES
"Foot & Ankle Surgery  May 11, 2020    CC: \"fissure on left foot; spots on right foot\"    I was asked to see Steve Morgan regarding the chief complaint by:  KURTIS GILL CNP    HPI:  Pt is a 60 year old male who presents with above complaint.  Bilateral lower extremity issues x months.  Was seen by Dr Mathis 2/11/20.  Was told he has a callus.  Wanted \"a 2nd opinion\".  \"what to do about foot\".  Pain 1/10 \"occasionally\".  \"wore sock all the time\".  Diabetic, most recent A1c 9.9.  Also wonders about a spot on michelle of the R foot.    ROS:   Pos for CC.  The patient denies current nausea, vomiting, chills, fevers, belly pain, calf pain, chest pain or SOB.  Complete remainder of ROS is otherwise neg.    VITALS:    Vitals:    05/11/20 1128   BP: 132/78   Weight: 106.6 kg (235 lb)   Height: 1.676 m (5' 6\")       PMH:    Past Medical History:   Diagnosis Date     Cellulitis and abscess of trunk 6/27/2017     Depression      Hyperlipidemia LDL goal <100 10/31/2010     Hypertension goal BP (blood pressure) < 140/90 3/17/2011     Hypothyroidism 1/12/2010     Morbid obesity due to excess calories (H) 1/12/2010     Neuropathy in diabetes (H) 1/12/2010     Obesity 1/12/2010     Sleep apnea 1/12/2010    CPAP     Type 2 diabetes, HbA1C goal < 8% (H) 3/8/2011       SXHX:    Past Surgical History:   Procedure Laterality Date     BIOPSY       COLONOSCOPY       EYE SURGERY       GENITOURINARY SURGERY      surg for undescended testicle     REPAIR HAMMER TOE BILATERAL  5/16/2013    Procedure: REPAIR HAMMER TOE BILATERAL;  Flexor Tenotomy Toes 2,3,4,5 Bilateral Feet;  Surgeon: Saad Bangura DPM;  Location:  OR        MEDS:    Current Outpatient Medications   Medication     albuterol (PROAIR HFA/PROVENTIL HFA/VENTOLIN HFA) 108 (90 Base) MCG/ACT inhaler     ammonium lactate (AMLACTIN) 12 % external cream     ammonium lactate (LAC-HYDRIN) 12 % cream     ASPIRIN 81 MG OR TABS     atenolol (TENORMIN) 25 MG tablet     blood glucose (ONE " TOUCH VERIO IQ) test strip     blood glucose monitoring (KERLINE CONTOUR NEXT) test strip     blood glucose monitoring (KERLINE MICROLET) lancets     buPROPion (WELLBUTRIN XL) 300 MG 24 hr tablet     Calcium Carb-Cholecalciferol (CALCIUM 600 + D PO)     citalopram (CELEXA) 40 MG tablet     Continuous Blood Gluc  (FREESTYLE GIULIANA 14 DAY READER) SIENA     Continuous Blood Gluc Sensor (FREESTYLE GIULIANA 14 DAY SENSOR) MISC     Cyanocobalamin (VITAMIN B 12 PO)     cyclobenzaprine (FLEXERIL) 10 MG tablet     ferrous sulfate 325 (65 FE) MG tablet     finasteride (PROSCAR) 5 MG tablet     hydrocortisone (WESTCORT) 0.2 % cream     insulin glargine (LANTUS SOLOSTAR) 100 UNIT/ML pen     insulin pen needle (B-D U/F) 31G X 5 MM miscellaneous     LEVOXYL 137 MCG tablet     losartan (COZAAR) 50 MG tablet     metFORMIN (GLUCOPHAGE-XR) 500 MG 24 hr tablet     MULTI-VITAMIN OR TABS     mupirocin (BACTROBAN) 2 % external ointment     NOVOLOG FLEXPEN 100 UNIT/ML soln     nystatin (MYCOSTATIN) 197363 UNIT/GM external cream     omeprazole-sodium bicarbonate (ZEGERID)  MG per capsule     order for DME     order for DME     simvastatin (ZOCOR) 20 MG tablet     VITAMIN D, CHOLECALCIFEROL, PO     No current facility-administered medications for this visit.        ALL:   No Known Allergies    FMH:    Family History   Problem Relation Age of Onset     Breast Cancer Mother      Hypertension Mother      Thyroid Disease Mother      Depression Mother      Alzheimer Disease Mother 82     Cancer - colorectal Father      Thyroid Disease Father      Depression Father      Other - See Comments Father         bladder polyps     Heart Disease Maternal Grandmother         CHF     Circulatory Paternal Grandmother      Cancer Paternal Grandfather      Diabetes Brother      Diabetes Brother      Asthma Brother        SocHx:    Social History     Socioeconomic History     Marital status:      Spouse name: Sri     Number of children: 2      Years of education: Not on file     Highest education level: Associate degree: occupational, technical, or vocational program   Occupational History     Occupation:      Employer: NONE      Comment: [employer]   Social Needs     Financial resource strain: Not hard at all     Food insecurity     Worry: Never true     Inability: Never true     Transportation needs     Medical: No     Non-medical: No   Tobacco Use     Smoking status: Never Smoker     Smokeless tobacco: Never Used   Substance and Sexual Activity     Alcohol use: Yes     Frequency: Monthly or less     Drinks per session: 1 or 2     Binge frequency: Never     Comment: Occassionally     Drug use: No     Sexual activity: Not Currently     Partners: Female     Birth control/protection: Abstinence   Lifestyle     Physical activity     Days per week: 0 days     Minutes per session: 0 min     Stress: Not at all   Relationships     Social connections     Talks on phone: Twice a week     Gets together: Not on file     Attends Oriental orthodox service: Never     Active member of club or organization: Yes     Attends meetings of clubs or organizations: 1 to 4 times per year     Relationship status:      Intimate partner violence     Fear of current or ex partner: Not on file     Emotionally abused: Not on file     Physically abused: Not on file     Forced sexual activity: Not on file   Other Topics Concern     Parent/sibling w/ CABG, MI or angioplasty before 65F 55M? No   Social History Narrative     Not on file           EXAMINATION:  Gen:   No apparent distress  Neuro:   A&Ox3, no deficits  Psych:    Answering questions appropriately for age and situation with normal affect  Head:    NCAT  Eye:    Visual scanning without deficit  Ear:    Response to auditory stimuli wnl  Lung:    Non-labored breathing on RA noted  Abd:    NTND per patient report  Lymph:    Neg for pitting/non-pitting edema BLE  Vasc:    Pulses palpable, CFT minimally delayed  Neuro:     Light touch sensation greatly diminished distally  Derm:    Dry skin plantar foot, including heels and forefoot.  Large callus with central fissure, including partial-thickness fissure < 20cm2, plantar-medial L 1st MPJ.  No SOI.  Abrasion dorsal R foot, no SOI  MSK:    Left lower extremity bunion.  Calf:    Neg for redness, swelling or tenderness    Assessment:  60 year old male with DMII with peripheral neuropathy and dry skin; callus/fissure plantar-medial L 1st MPJ in setting of DMII with neuropathy and bunion; abrasion dorsal R foot without SOI      Plan:  Discussed etiologies, anatomy and options  1.  DMII with peripheral neuropathy and dry skin  -Regarding the patient's diabetes, we dispensed and discussed proper diabetic foot care.  This includes closely monitoring their blood sugars, closely monitoring their blood pressures, and evaluating their feet at least once per day.  We also discussed potential problems associated with barefoot/sock ambulation and soaking their feet.    -offered Lac Hydrin, states he thinks he has some    2.  callus/fissure plantar-medial L 1st MPJ in setting of DMII with neuropathy and bunion  -Excisional debridement was performed, partial-thickness(limited to skin breakdown, no exposed subcutaneous fat), sharply debriding the wound, excising nonviable tissue to the above dimensions with a tissue nipper  -daily cares - wash/dry, abx ointment/bandaid until healed  -use current [employer] shoes  -Orthotic Lab referral for diabetic shoes/orthotics  -no abx needed    3.  Abrasion dorsal R foot  -abx ointment and bandage applied; continue until healed  -no indicatoin for PO abx           Follow up:  prn or sooner with acute issues      Patient's medical history was reviewed today    Body mass index is 37.93 kg/m .  Weight management plan: Patient was referred to their PCP to discuss a diet and exercise plan.        Eleazar Zafar DPM [facility] FACFAOM  Podiatric Foot & Ankle  Surgeon  [facility]  [phone]

## 2020-05-20 ENCOUNTER — VIRTUAL VISIT (OUTPATIENT)
Dept: SLEEP MEDICINE | Facility: CLINIC | Age: 60
End: 2020-05-20
Attending: PHYSICIAN ASSISTANT
Payer: MEDICARE

## 2020-05-20 VITALS — WEIGHT: 235 LBS | BODY MASS INDEX: 37.77 KG/M2 | HEIGHT: 66 IN

## 2020-05-20 DIAGNOSIS — I73.9 PERIPHERAL ARTERIAL DISEASE (H): ICD-10-CM

## 2020-05-20 DIAGNOSIS — G47.8 UPPER AIRWAY RESISTANCE SYNDROME: Primary | ICD-10-CM

## 2020-05-20 DIAGNOSIS — F33.1 MODERATE EPISODE OF RECURRENT MAJOR DEPRESSIVE DISORDER (H): ICD-10-CM

## 2020-05-20 DIAGNOSIS — G47.33 OSA (OBSTRUCTIVE SLEEP APNEA): ICD-10-CM

## 2020-05-20 DIAGNOSIS — E66.9 OBESITY (BMI 30-39.9): ICD-10-CM

## 2020-05-20 DIAGNOSIS — I10 ESSENTIAL HYPERTENSION WITH GOAL BLOOD PRESSURE LESS THAN 140/90: ICD-10-CM

## 2020-05-20 PROCEDURE — 99443: CPT | Performed by: PHYSICIAN ASSISTANT

## 2020-05-20 ASSESSMENT — MIFFLIN-ST. JEOR: SCORE: 1808.7

## 2020-05-20 NOTE — PROGRESS NOTES
"Steve Morgan is a 60 year old male who is being evaluated via a billable telephone visit.      The patient has been notified of following:     \"This telephone visit will be conducted via a call between you and your physician/provider. We have found that certain health care needs can be provided without the need for a physical exam.  This service lets us provide the care you need with a short phone conversation.  If a prescription is necessary we can send it directly to your pharmacy.  If lab work is needed we can place an order for that and you can then stop by our lab to have the test done at a later time.    Telephone visits are billed at different rates depending on your insurance coverage. During this emergency period, for some insurers they may be billed the same as an in-person visit.  Please reach out to your insurance provider with any questions.    If during the course of the call the physician/provider feels a telephone visit is not appropriate, you will not be charged for this service.\"    Patient has given verbal consent for Telephone visit?  Yes    What phone number would you like to be contacted at? [phone]    How would you like to obtain your AVS? Jarrod Root MA on 5/20/2020 at 10:30 AM      [facility]   Outpatient Sleep Medicine Consultation  May 20, 2020      Name: Steve Morgan MRN# [id number]   Age: 60 year old YOB: 1960     Date of Consultation: May 20, 2020  Consultation is requested by: Lisa Pierre PA-C  [address] [place] [address] Lisa Pierre  Primary care provider: Lisa Pierre       Chief Complaint / Reason for Sleep Consult:     \"The humidifier on current CPAP machine no longer works and I was told by my medical equipment provider the I either need a letter from a doctor stating I need a machine or a new prescription because current prescription on current machine is out of date and has to be replaced\". " "         History of Present Illness:     Steve Morgan is a 60 year old male who presents to the clinic via telephone to establish care and obtain new CPAP machine. Other significant past medical history significant for HTN, HLD, DM with peripheral neuropathy, hypothyroidism, PAD, BPH, morbid obesity, and depression.     Caio states that he was originally diagnosed with DAMIÁN in 2000/2001 and was started on CPAP at that time. More recently, he had a split night PSG completed on 1/18/2010 through [facility] revealing predominantly respiratory effort related arousals and severe upper airway resistance syndrome with an AHI 1.6 and RDI 33.8; CPAP 8cmH2O adequately treated sleep disordered breathing events. He was treated with CPAP 8cmH2O for a number of years until switched to auto- titrating PAP [id number] in 12/2014 due to residular snoring and weight gain (pt was 104kg at time of study, 107kg in 12/2014). Continues to use autoPAP [id number]. Compliance data as follows: [employer]ics Auto-PAP 7-15 cmH2O download (3/17/20-4/14/20):  30 total days of use. 0 nonuse days.  Average use 9 hours 40 minutes per day.  100% days with >4 hours use.  Large leak 4 sec per day average. CPAP 90% pressure 12.4cm. AHI 2.7    Caio states that the primary reason for today's visit is to obtain a new CPAP prescription. The humidifier on his current machine is no longer working and has led to some nasal dryness/irritation and epistaxis. His current machine was obtained in 2010. Currently using [employer] as DME company but interested in switching. Patient is using a nasal mask. The mask is comfortable. The mask is not leaking. He does not believe he is snoring with he mask on (his wife sleeps in separate bedroom). He does snore \"very loudly if I fall asleep on the couch without it on\". Denies gasp arousals but has noticed that he will wake up coughing \"more days then not recently\", suspect due to dry air and lack of humidifier function. This has never " "been an issue before when humidifier was functional. He denies pain/skin breakdown. The pressure settings are comfortable.     Patient is happy with his progress on CPAP, specifically \"I have no more snoring and I don't stop breathing and I know I am sleeping better even though I still wake up to go to the bathroom but I am not waking up all the time anymore\".     We reviewed Caio's sleep schedule. On weekdays, goes to sleep at 9:30PM and awakens at 6:30AM. On weekends goes to bed at 10:30PM and awakens at 8:30AM.  Falls asleep in 15-30 minutes; denies difficulty falling asleep.  Awakens 2-3 times a night for 5-10 minutes before falling back to sleep; awakens to use the restroom. Denies planned naps but will take occasional inadvertent 20 minute naps in front of TV, though this is happening much less frequently since COVID19 since his wife is working from home and \"I have more to do and I am more active\".     Patient does not watch TV, use electronics, read, eat, or work in bed. Denies anxiety or rumination. Patient is retired.     Patient denies any abnormal movements or behaviors in their sleep. No dream enactment behavior. No somniloquy.  No somnambulism.  No sleep related eating. Very rare nightmares, a couple per year. Reports bruxism, treated with bite guard.     Patient reports typical restless legs syndrome symptoms \"once in a while\", about once every few months. Reports leg movements.      No sleep attacks or sudden urges to sleep in inappropriate situations. No hypnagogic/hypnopompic hallucinations.  No cataplexy.    SCALES       INSOMNIA:  Insomnia severity score: 8/28       SLEEPINESS: Desdemona sleepiness scale: 5 [normal < 11]          Medications:     Current Outpatient Medications   Medication Sig     ammonium lactate (AMLACTIN) 12 % external cream Apply topically daily Apply to feet daily     ammonium lactate (LAC-HYDRIN) 12 % cream Apply topically 2 times daily as needed for dry skin     ASPIRIN 81 " MG OR TABS ONE DAILY     atenolol (TENORMIN) 25 MG tablet Take 1 tablet (25 mg) by mouth daily     buPROPion (WELLBUTRIN XL) 300 MG 24 hr tablet Take 1 tablet (300 mg) by mouth every morning     Calcium Carb-Cholecalciferol (CALCIUM 600 + D PO) Take 1 tablet by mouth daily     citalopram (CELEXA) 40 MG tablet Take 1 tablet (40 mg) by mouth daily     Cyanocobalamin (VITAMIN B 12 PO) Take 250 mcg by mouth daily     ferrous sulfate 325 (65 FE) MG tablet Take 1 tablet by mouth daily (with breakfast).     finasteride (PROSCAR) 5 MG tablet Take 1 tablet (5 mg) by mouth daily     hydrocortisone (WESTCORT) 0.2 % cream Apply topically 2 times daily as needed Apply sparingly to affected area, BID.     LEVOXYL 137 MCG tablet Take 1 tablet (137 mcg) by mouth daily Dispense name brand Levoxyl only, no generics     losartan (COZAAR) 50 MG tablet Take 1 tablet (50 mg) by mouth daily     metFORMIN (GLUCOPHAGE-XR) 500 MG 24 hr tablet TAKE 2 TABLETS (1,000 MG) BY MOUTH DAILY (WITH DINNER)     MULTI-VITAMIN OR TABS 1 TABLET DAILY     nystatin (MYCOSTATIN) 268243 UNIT/GM external cream Apply topically 2 times daily     omeprazole-sodium bicarbonate (ZEGERID)  MG per capsule Take 1 capsule by mouth every morning (before breakfast)     simvastatin (ZOCOR) 20 MG tablet Take 1 tablet (20 mg) by mouth At Bedtime     VITAMIN D, CHOLECALCIFEROL, PO Take 1,000 Units by mouth daily.     blood glucose (ONE TOUCH VERIO IQ) test strip Use to test blood sugars 2 times daily or as directed. (Patient not taking: Reported on 5/20/2020)     blood glucose monitoring (KERLINE CONTOUR NEXT) test strip Use to test blood sugar 3 times daily or as directed. (Patient not taking: Reported on 5/20/2020)     blood glucose monitoring (KERLINE MICROLET) lancets Use to test blood sugar 3 times daily or as directed. (Patient not taking: Reported on 5/20/2020)     Continuous Blood Gluc  (FREESTYLE GIULIANA 14 DAY READER) SIENA 1 each continuous (Patient not  taking: Reported on 5/20/2020)     Continuous Blood Gluc Sensor (FREESTYLE GIULIANA 14 DAY SENSOR) MISC USE 1 SENSOR EVERY 14 DAYS (Patient not taking: Reported on 5/20/2020)     insulin glargine (LANTUS SOLOSTAR) 100 UNIT/ML pen 32 units qd.  Increase as directed to max dose of 45 units qd. (Patient not taking: Reported on 5/20/2020)     insulin pen needle (B-D U/F) 31G X 5 MM miscellaneous USE 4 DAILY OR AS DIRECTED. (Patient not taking: Reported on 5/20/2020)     mupirocin (BACTROBAN) 2 % external ointment Apply topically 3 times daily     NOVOLOG FLEXPEN 100 UNIT/ML soln Use 1 unit per 9 gms of carb + correction 1:50 above 150 mg/dl. Titrate as directed up to 45 units per day. (Patient not taking: Reported on 5/20/2020)     order for DME Equipment being ordered: Please dispense up to 3 pairs of knee high compression stockings at 20-30 mmHg and one donning device if needed. (Patient not taking: Reported on 5/20/2020)     order for DME Equipment being ordered: Lt wrist splint (Patient not taking: Reported on 5/20/2020)     No current facility-administered medications for this visit.              Allergies:     No Known Allergies         Past Medical History:     Past Medical History:   Diagnosis Date     Cellulitis and abscess of trunk 6/27/2017     Depression      Hyperlipidemia LDL goal <100 10/31/2010     Hypertension goal BP (blood pressure) < 140/90 3/17/2011     Hypothyroidism 1/12/2010     Morbid obesity due to excess calories (H) 1/12/2010     Neuropathy in diabetes (H) 1/12/2010     Obesity 1/12/2010     Sleep apnea 1/12/2010    CPAP     Type 2 diabetes, HbA1C goal < 8% (H) 3/8/2011             Past Surgical History:    Previous upper airway surgery: none  Past Surgical History:   Procedure Laterality Date     BIOPSY       COLONOSCOPY       EYE SURGERY       GENITOURINARY SURGERY      surg for undescended testicle     REPAIR HAMMER TOE BILATERAL  5/16/2013    Procedure: REPAIR HAMMER TOE BILATERAL;  Flexor  "Tenotomy Toes 2,3,4,5 Bilateral Feet;  Surgeon: Saad Bangura DPM;  Location: [facility] OR            Social History:     Social History     Tobacco Use     Smoking status: Never Smoker     Smokeless tobacco: Never Used   Substance Use Topics     Alcohol use: Yes     Frequency: Monthly or less     Drinks per session: 1 or 2     Binge frequency: Never     Comment: Occassionally     Chemical History:  Alcohol use: Occasionally, less than once a month, \"only with special occasions like New Years\"      Tobacco use: None  Illicit substances: None  Caffeine intake: Drinks 2L of pop per day. Last caffeine intake is usually before 4:00PM           Family History:     Family History   Problem Relation Age of Onset     Breast Cancer Mother      Hypertension Mother      Thyroid Disease Mother      Depression Mother      Alzheimer Disease Mother 82     Cancer - colorectal Father      Thyroid Disease Father      Depression Father      Other - See Comments Father         bladder polyps     Heart Disease Maternal Grandmother         CHF     Circulatory Paternal Grandmother      Cancer Paternal Grandfather      Diabetes Brother      Diabetes Brother      Asthma Brother         Sleep Family Hx: Patient denies any known family history of sleep apnea, restless legs syndrome, insomnia, or parasomnias.          Review of Systems:   A complete 10 point review of systems was negative other than HPI or as commented below:   CONSTITUTIONAL: NEGATIVE for weight gain/loss, fever, chills, sweats or night sweats, drug allergies.  EYES:  POSITIVE for changes in vision and NEGATIVE for blind spots and double vision  ENT:  POSITIVE for  runny nose, bloody nose and \"ear plugged\" and NEGATIVE for  ear pain, sore throat, sinus pain and post-nasal drip  CARDIAC:  POSITIVE for  swollen feet and HTN and NEGATIVE for  fast heart beats, fluttering in chest, chest pain, chest pressure and breathlessness when lying flat  NEUROLOGIC:  POSITIVE for  headaches " "and numbness in the arms or legs and NEGATIVE for  weakness in the arms or legs  DERMATOLOGIC: NEGATIVE for rashes, new moles or change in mole(s)  PULMONARY:  POSITIVE for  SOB with activity and dry cough and NEGATIVE for  SOB at rest, productive cough, coughing up blood and wheezing   GASTROINTESTINAL:  POSITIVE for  nausea and loose or watery stools and NEGATIVE for  fat or grease in stools, constipation, abdominal pain, bowel movements black in color and blood in stool  GENITOURINARY: NEGATIVE for pain during urination, blood in urine, urinating more frequently than usual, irregular menstrual periods.  MUSCULOSKELETAL: NEGATIVE for muscle pain, bone or joint pain, swollen joints.  ENDOCRINE:  POSITIVE for  increased urination and diabetes  LYMPHATIC: NEGATIVE for swollen lymph nodes, lumps or bumps in the breasts or nipple discharge.  PSYCH: POSITIVE for depression and NEGATIVE for anxiety.          Physical Examination:   Ht 1.676 m (5' 6\")   Wt 106.6 kg (235 lb)   BMI 37.93 kg/m    General: Very pleasant and cooperative. In no apparent distress.  Pulmonary:  Able to speak easily in full sentences. No cough or wheezing heard.   Neurologic: Alert, oriented x3.   Psychiatric: Mood euthymic. Affect congruent with full range and intensity.          Data: All pertinent previous laboratory data reviewed     No results found for: PH, PHARTERIAL, PO2, [id number], SAT, PCO2, HCO3, BASEEXCESS, AUDREY, BEB  Lab Results   Component Value Date    TSH 0.55 03/05/2020    TSH 5.20 (H) 12/05/2019     Lab Results   Component Value Date     (H) 01/16/2020     (H) 10/24/2019     Lab Results   Component Value Date    HGB 11.9 (L) 01/16/2020    HGB 12.3 (L) 05/17/2017     Lab Results   Component Value Date    BUN 13 01/16/2020    BUN 17 10/24/2019    CR 1.01 01/16/2020    CR 1.09 10/24/2019     Lab Results   Component Value Date    AST 24 01/16/2020    AST 18 08/29/2019    ALT 34 01/16/2020    ALT 28 08/29/2019    " ALKPHOS 76 01/16/2020    ALKPHOS 77 08/29/2019    BILITOTAL 0.4 01/16/2020    BILITOTAL 0.4 08/29/2019     No results found for: UAMP, UBARB, BENZODIAZEUR, UCANN, UCOC, OPIT, UPCP    Dobutamine Echocardiogram  3/13/2018:  Interpretation Summary  The patient exhibited chest pain during the drug infusion.  No ST segment changes with Dobutamine stress  No Dobutamine induced wall motion abnormalities  This was a normal stress echocardiogram with no evidence of stress-induced  ischemia.           Assessment and Plan:   1. DAMIÁN (obstructive sleep apnea)  2. Upper airway resistance syndrome  3. Obesity (BMI 30-39.9)  4. Essential hypertension with goal blood pressure less than 140/90  5. Peripheral arterial disease (H)  6. Moderate episode of recurrent major depressive disorder (H)    Patient presents with a history of severe upper airway resistance syndrome that is adequately managed with current PAP settings [id number] with residual AHI of 2.7/hr. He has been treated with PAP the past 20 years and has tolerated well. Daytime symptoms have improved with PAP therapy. Primary reason for today's visit is to obtain new CPAP machine as current machine is 10 years old and the humidifier function is no longer working. Prescription for replacement machine and updated mask/supllies sent today. Caio would like to switch DME provider, currently using [facility] [facility] but would like to transfer to [facility] location.   - Comprehensive DME    Patient was counseled on the importance of driving while alert, to pull over if drowsy, or nap before getting into the vehicle if sleepy.    Steve Morgan will follow-up in about 1 year, or sooner as needed.     Copy to: Lisa Pierre PA-C  May 20, 2020     [facility]  3rd Floor  [address] [place] [address]     [facility]  [address] [address]      Phone call duration: 33:11  minutes

## 2020-05-20 NOTE — PATIENT INSTRUCTIONS
Your BMI is Body mass index is 37.93 kg/m .  Weight management is a personal decision.  If you are interested in exploring weight loss strategies, the following discussion covers the approaches that may be successful. Body mass index (BMI) is one way to tell whether you are at a healthy weight, overweight, or obese. It measures your weight in relation to your height.  A BMI of 18.5 to 24.9 is in the healthy range. A person with a BMI of 25 to 29.9 is considered overweight, and someone with a BMI of 30 or greater is considered obese. More than two-thirds of American adults are considered overweight or obese.  Being overweight or obese increases the risk for further weight gain. Excess weight may lead to heart disease and diabetes.  Creating and following plans for healthy eating and physical activity may help you improve your health.  Weight control is part of healthy lifestyle and includes exercise, emotional health, and healthy eating habits. Careful eating habits lifelong are the mainstay of weight control. Though there are significant health benefits from weight loss, long-term weight loss with diet alone may be very difficult to achieve- studies show long-term success with dietary management in less than 10% of people. Attaining a healthy weight may be especially difficult to achieve in those with severe obesity. In some cases, medications, devices and surgical management might be considered.  What can you do?  If you are overweight or obese and are interested in methods for weight loss, you should discuss this with your provider.     Consider reducing daily calorie intake by 500 calories.     Keep a food journal.     Avoiding skipping meals, consider cutting portions instead.    Diet combined with exercise helps maintain muscle while optimizing fat loss. Strength training is particularly important for building and maintaining muscle mass. Exercise helps reduce stress, increase energy, and improves fitness.  Increasing exercise without diet control, however, may not burn enough calories to loose weight.       Start walking three days a week 10-20 minutes at a time    Work towards walking thirty minutes five days a week     Eventually, increase the speed of your walking for 1-2 minutes at time    In addition, we recommend that you review healthy lifestyles and methods for weight loss available through the National Institutes of Health patient information sites:  http://win.niddk.nih.gov/publications/index.htm    And look into health and wellness programs that may be available through your health insurance provider, employer, local community center, or blaise club.    Weight management plan: Patient was referred to their PCP to discuss a diet and exercise plan.

## 2020-05-21 ENCOUNTER — TELEPHONE (OUTPATIENT)
Dept: SLEEP MEDICINE | Facility: CLINIC | Age: 60
End: 2020-05-21

## 2020-05-21 NOTE — TELEPHONE ENCOUNTER
Per checkout report 5/20/20, Pt in need of new machine and would like to transfer care to [facility]. Please advise

## 2020-05-22 ENCOUNTER — MYC MEDICAL ADVICE (OUTPATIENT)
Dept: SLEEP MEDICINE | Facility: CLINIC | Age: 60
End: 2020-05-22

## 2020-05-22 NOTE — TELEPHONE ENCOUNTER
Spoke with patient informed him that I would send his information to [facility] to start the transfer process. I will also send [facility] contact information to patient via [employer].

## 2020-06-01 ENCOUNTER — TELEPHONE (OUTPATIENT)
Dept: ENDOCRINOLOGY | Facility: CLINIC | Age: 60
End: 2020-06-01

## 2020-06-01 DIAGNOSIS — E11.40 TYPE 2 DIABETES MELLITUS WITH DIABETIC NEUROPATHY, UNSPECIFIED WHETHER LONG TERM INSULIN USE (H): Primary | ICD-10-CM

## 2020-06-01 NOTE — TELEPHONE ENCOUNTER
Fax from [facility], Lantus NOT covered, covered ALTERNATIVES:    ~basaglar 100 unit/ml kwikpen  ~levemir flextouch or vial  ~tresiba vial or flextouch    Routed to , please send alternate    Lab Results   Component Value Date    A1C 9.9 03/12/2020    A1C 11.7 12/05/2019     Recent Labs   Lab Test 03/05/20  0857 01/16/20  0942  05/21/15  1104 04/19/14  0802   CHOL 139 154   < > 137 122   HDL 33* 32*   < > 30* 23*   LDL 88 97   < > 78 71   TRIG 89 126   < > 144 141   CHOLHDLRATIO  --   --   --  4.6 5.3*    < > = values in this interval not displayed.     BP Readings from Last 2 Encounters:   05/11/20 132/78   03/12/20 128/82         Lucía Bond RN, BSN  Message handled by Nurse Triage.

## 2020-06-02 NOTE — TELEPHONE ENCOUNTER
Spoke with Caio and gave him [facility] number in [facility] to call to schedule replacement machine setup.  Explained his order and sleep study are scanned into [facility] medical record and [facility] have access to all.  He understood.

## 2020-06-03 NOTE — TELEPHONE ENCOUNTER
Called pt, still sleeping, wife aware of lantus issue, informed alternate sent, will inform pt  Lucía Bond RN, BSN  Message handled by CLINIC NURSE.

## 2020-06-03 NOTE — TELEPHONE ENCOUNTER
Rx for Levemir  sent to his pharmacy.  Maybe we should let him know insurance coverage changed from Lantus to Levemir - he takes the Levemir same as he took Lantus.  Diana Butler NP  Endocrinology

## 2020-06-12 ENCOUNTER — MYC MEDICAL ADVICE (OUTPATIENT)
Dept: ENDOCRINOLOGY | Facility: CLINIC | Age: 60
End: 2020-06-12

## 2020-06-17 ENCOUNTER — VIRTUAL VISIT (OUTPATIENT)
Dept: ENDOCRINOLOGY | Facility: CLINIC | Age: 60
End: 2020-06-17
Payer: MEDICARE

## 2020-06-17 DIAGNOSIS — E11.65 UNCONTROLLED TYPE 2 DIABETES MELLITUS WITH HYPERGLYCEMIA (H): ICD-10-CM

## 2020-06-17 DIAGNOSIS — E78.5 HYPERLIPIDEMIA LDL GOAL <100: ICD-10-CM

## 2020-06-17 DIAGNOSIS — Z79.4 TYPE 2 DIABETES MELLITUS WITH DIABETIC NEUROPATHY, WITH LONG-TERM CURRENT USE OF INSULIN (H): Primary | ICD-10-CM

## 2020-06-17 DIAGNOSIS — E03.4 HYPOTHYROIDISM DUE TO ACQUIRED ATROPHY OF THYROID: ICD-10-CM

## 2020-06-17 DIAGNOSIS — E11.40 TYPE 2 DIABETES MELLITUS WITH DIABETIC NEUROPATHY, WITH LONG-TERM CURRENT USE OF INSULIN (H): Primary | ICD-10-CM

## 2020-06-17 PROCEDURE — 99443 ZZC PHYSICIAN TELEPHONE EVALUATION 21-30 MIN: CPT | Performed by: CLINICAL NURSE SPECIALIST

## 2020-06-17 NOTE — TELEPHONE ENCOUNTER
Nina report printed and given to provider for today's appointment.  Patient informed.   Rani Heck CMA on 6/17/2020 at 3:30 PM  
Please see message below and advise.  
Rani,  Can you print his Nina report for me?  Thanks,  Diana Butler NP  Endocrinology    
No

## 2020-06-17 NOTE — PROGRESS NOTES
"  Steve Morgan is a 60 year old male who is being evaluated via a billable telephone visit due to the COVID-19 pandemic.      The patient has been notified of following:     \"This telephone visit will be conducted via a call between you and your physician/provider. We have found that certain health care needs can be provided without the need for a physical exam.  This service lets us provide the care you need with a short phone conversation.  If a prescription is necessary we can send it directly to your pharmacy.  If lab work is needed we can place an order for that and you can then stop by our lab to have the test done at a later time.    Telephone visits are billed at different rates depending on your insurance coverage. During this emergency period, for some insurers they may be billed the same as an in-person visit.  Please reach out to your insurance provider with any questions.    If during the course of the call the physician/provider feels a telephone visit is not appropriate, you will not be charged for this service.\"    Patient has given verbal consent for Telephone visit?  Yes    What phone number would you like to be contacted at? [phone]    How would you like to obtain your AVS? Jarrod [email or web address]     Steve Morgan is a 60 year old male who presents via phone visit today for the following health issues:      Name: Steve \"Milo" Cathy  F/u for Diabetes (Last seen 3/12/2020).  HPI:  1. Type 2 DM:  Orginally diagnosed at the age of 33 or 38.  Was prediabetic prior, was not particularly symptomatic at the time, was noted on routine lab work    Current Regimen: Metformin 1000 mg qd, Levemir 32 units every day. Novolog 1:9 + 1:50>150 (has a printed correction scale).  Glucose high overnight due to evening high-carb snacking.  Tries to eat low carb snacks but isn't doing very well.  The plan was to start Trulicity but the copay was not affordable ($100 per refill).    Has been unable to tolerate a " higher dose of Metformin due to diarrhea.  Still has some diarrhea at the 1000 mg/day dose.    BS checks: continuously via [employer] Download: Average ; 75% above target range, 25% in target range.  0% below range.  BG target range .  Unable to use glucose meter - he has a hard time doing fingerstick testing due to his tremor.    Last diabetes ed appointment:  4/9/2020.        Complications:   Diabetes Complications  Description / Detail    Diabetic Retinopathy  No retinopathy,last exam 2/2020, [facility]   CAD / PAD  No   Neuropathy  Yes + diabetic neuropathy: numbness hands and feet.  Saw podiatry, Dr. Mathis, 1/27/2018- using diabetic shoes   Nephropathy / Microalbuminuria  Yes, elevated urine microalbumin   Gastroparesis  No   Hypoglycemia [place]  Not sure, gets 'kind of dizzy' when blood sugar is low but reports history of low blood sugars without symptoms     2. Hypertension: Blood Pressure:   BP Readings from Last 3 Encounters:   05/11/20 132/78   03/12/20 128/82   02/11/20 138/80   Blood pressure medications include atenolol 25 mg qd and losartan 50 mg every day.   3. Hyperlipidemia: Takes simvastatin 20 mg for lipid control.       4. Prevention:  Flu Shot- 10/2019  Pneumovax- 2012  Opthalmology-Yes: annually  Dental-Yes: twice a year  ASA-Yes, 81 mg qd   Smoking- No  5.  Hypothyroidism. Currently treated with levoxyl (KAMERON) 125 mcg daily. Takes the levoxyl first thing in the morning and waits 30+ minutes before eating breakfast.  PA for Levoxyl approved through 4/7/2021.  PMH/PSH:  Past Medical History:   Diagnosis Date     Cellulitis and abscess of trunk 6/27/2017     Depression      Hyperlipidemia LDL goal <100 10/31/2010     Hypertension goal BP (blood pressure) < 140/90 3/17/2011     Hypothyroidism 1/12/2010     Morbid obesity due to excess calories (H) 1/12/2010     Neuropathy in diabetes (H) 1/12/2010     Obesity 1/12/2010     Sleep apnea 1/12/2010    CPAP     Type 2 diabetes, HbA1C  goal < 8% (H) 3/8/2011     Past Surgical History:   Procedure Laterality Date     BIOPSY       COLONOSCOPY       EYE SURGERY       GENITOURINARY SURGERY      surg for undescended testicle     REPAIR HAMMER TOE BILATERAL  5/16/2013    Procedure: REPAIR HAMMER TOE BILATERAL;  Flexor Tenotomy Toes 2,3,4,5 Bilateral Feet;  Surgeon: Saad Bangura DPM;  Location: [facility] OR     Family Hx:  Family History   Problem Relation Age of Onset     Breast Cancer Mother      Hypertension Mother      Thyroid Disease Mother      Depression Mother      Alzheimer Disease Mother 82     Cancer - colorectal Father      Thyroid Disease Father      Depression Father      Other - See Comments Father         bladder polyps     Heart Disease Maternal Grandmother         CHF     Circulatory Paternal Grandmother      Cancer Paternal Grandfather      Diabetes Brother      Diabetes Brother      Asthma Brother      Thyroid disease: Yes: mother         DM2: Yes: one brother with type 1 diabetes and one brother with type 2 diabetes, paternal aunt with DM2         Autoimmune: DM1, SLE, RA, Vitiligo Yes: brother with DM1    Social Hx:  Social History     Socioeconomic History     Marital status:      Spouse name: Sri     Number of children: 2     Years of education: Not on file     Highest education level: Associate degree: occupational, technical, or vocational program   Occupational History     Occupation:      Employer: NONE      Comment: [employer]   Social Needs     Financial resource strain: Not hard at all     Food insecurity     Worry: Never true     Inability: Never true     Transportation needs     Medical: No     Non-medical: No   Tobacco Use     Smoking status: Never Smoker     Smokeless tobacco: Never Used   Substance and Sexual Activity     Alcohol use: Yes     Frequency: Monthly or less     Drinks per session: 1 or 2     Binge frequency: Never     Comment: Occassionally     Drug use: No     Sexual activity: Not  Currently     Partners: Female     Birth control/protection: Abstinence   Lifestyle     Physical activity     Days per week: 0 days     Minutes per session: 0 min     Stress: Not at all   Relationships     Social connections     Talks on phone: Twice a week     Gets together: Not on file     Attends Hindu service: Never     Active member of club or organization: Yes     Attends meetings of clubs or organizations: 1 to 4 times per year     Relationship status:      Intimate partner violence     Fear of current or ex partner: Not on file     Emotionally abused: Not on file     Physically abused: Not on file     Forced sexual activity: Not on file   Other Topics Concern     Parent/sibling w/ CABG, MI or angioplasty before 65F 55M? No   Social History Narrative     Not on file          MEDICATIONS:  has a current medication list which includes the following prescription(s): ammonium lactate, ammonium lactate, aspirin, atenolol, bupropion, calcium carb-cholecalciferol, citalopram, freestyle juan 14 day reader, freestyle juan 14 day sensor, cyanocobalamin, ferrous sulfate, finasteride, hydrocortisone, insulin detemir, insulin pen needle, levoxyl, losartan, metformin, multi-vitamin, mupirocin, novolog flexpen, nystatin, omeprazole-sodium bicarbonate, simvastatin, and cholecalciferol.    ROS     ROS: 10 point ROS neg other than the symptoms noted above in the HPI.    Objective   Reported vitals:  There were no vitals taken for this visit.   healthy, alert and no distress  PSYCH: Alert and oriented times 3; coherent speech, normal   rate and volume, able to articulate logical thoughts, able   to abstract reason, no tangential thoughts, no hallucinations   or delusions  His affect is normal and pleasant  RESP: No cough, no audible wheezing, able to talk in full sentences  Remainder of exam unable to be completed due to telephone visits    LABS:  A1c:   Component      Latest Ref Rng & Units 12/14/2018 8/29/2019  "12/5/2019 3/12/2020   Hemoglobin A1C      0 - 5.6 % 11.8 (H) 11.6 (H) 11.7 (H) 9.9 (H)     Basic Metabolic Panel:  !COMPREHENSIVE Latest Ref Rng & Units 10/24/2019   SODIUM 133 - 144 mmol/L 129 (L)   POTASSIUM 3.4 - 5.3 mmol/L 5.0   CHLORIDE 94 - 109 mmol/L 98   BUN 7 - 30 mg/dL 17   Creatinine 0.66 - 1.25 mg/dL 1.09   Glucose 70 - 99 mg/dL 285 (H)   ANION GAP 3 - 14 mmol/L 5   CALCIUM 8.5 - 10.1 mg/dL 8.8     Component      Latest Ref Rng & Units 8/29/2019   Glucose 316   C-Peptide      0.9 - 6.9 ng/mL 2.5     LFTS/Cholesterol Panel:  !LIPID/HEPATIC Latest Ref Rng & Units 8/29/2019   CHOLESTEROL <200 mg/dL 192   TRIGLYCERIDES <150 mg/dL 186 (H)   HDL CHOLESTEROL >39 mg/dL 34 (L)   LDL CHOLESTEROL, CALCULATED <100 mg/dL 121 (H)   VLDL-CHOLESTEROL 0 - 30 mg/dL    NON HDL CHOLESTEROL <130 mg/dL 158 (H)   CHOLESTEROL/HDL RATIO 0.0 - 5.0    AST 0 - 45 U/L 18   ALT 0 - 70 U/L 28     Thyroid Function:   !THYROID Latest Ref Rng & Units 3/5/2020 12/5/2019 12/14/2018   TSH 0.40 - 4.00 mU/L 0.55 5.20 (H) 2.19   T4 FREE 0.76 - 1.46 ng/dL  1.19      Component    Latest Ref Rng & Units 10/19/2018   Thyroid Peroxidase Antibody    <35 IU/mL 11   Thyroglobulin Antibody    <40 IU/mL <20   Thyroid Stim Immunog    <=1.3 TSI index <1.0     Urine MicroAlbumin:  Component      Latest Ref Rng & Units 3/5/2020   Creatinine Urine      mg/dL 18   Albumin Urine mg/L      mg/L 14   Albumin Urine mg/g Cr      0 - 17 mg/g Cr 81.46 (H)       Vital Signs 4/2/2019 4/17/2019 8/29/2019   Weight (LB) 223 lb 236 lb 243 lb 3.2 oz   Height 5' 5\"  5' 6\"   BMI (Calculated) 37.11  39.25       All pertinent notes, labs, and images personally reviewed by me.     A/P  Mr.Walter Morgan is a 59 year old evaluated via phone visit for the management of:    1. DM2 - Uncontrolled. Recent blood sugars deteriorating in control, average glucose increased from 161 (4/2020) to 229 today.   Dietary indiscretions contributing factor but also noted to have low insulin " production based on a cpeptide of 2.5 with a glucose >300.  Diabetes is complicated by neuropathy and nephropathy/elevated urine microalbumin.    He has challenges with blood sugar testing due to limited use of his hands (neuropathy + tremor - both significant) and increasing difficulty with ADL's   Can't test his blood sugars using a meter due to the above.   He also has an extremely hard time inserting the Nina sensors due to neuropathy and tremor.    Continue Metformin XR 1000 mg daily.  Continue Levemir 32 units once daily  Continue Novolog insulin to carb ratio to 1:9 and continue current correction 1:50>150.  Encouraged to eat low carb evening snacks however, if eating higher carb evening snack he can try covering the snack with a low dose of Novolog at 1 unit per 20 grams.  Follow up with TOSHA Ramirez - new referral placed today.    2. Hypothyroidism.  Currently treated with Levoxyl (KAMERON) 125 mcg/day.  Clinically and biochemically euthyroid. Continue Levoxyl 125 mcg/day.     3. Hyperlipidemia - On statin therapy.      Labs ordered today:   Orders Placed This Encounter   Procedures     AMBULATORY ADULT DIABETES EDUCATOR REFERRAL       Radiology/Consults ordered today: AMBULATORY ADULT DIABETES EDUCATOR REFERRAL       Follow-up:  3-4 months with mahendra Butler NP  Endocrinology  [facility]  CC: Lisa Pierre Mary, CDE    Phone call duration:  22 minutes.  Call start time: 2:04 pm; call end time: 2:26 pm.

## 2020-06-17 NOTE — LETTER
"    6/17/2020         RE: Steve Morgan  [id number]  [facility] [address]        Dear Colleague,    Thank you for referring your patient, Steve Morgan, to the [facility]. Please see a copy of my visit note below.      Steve Morgan is a 60 year old male who is being evaluated via a billable telephone visit due to the COVID-19 pandemic.      The patient has been notified of following:     \"This telephone visit will be conducted via a call between you and your physician/provider. We have found that certain health care needs can be provided without the need for a physical exam.  This service lets us provide the care you need with a short phone conversation.  If a prescription is necessary we can send it directly to your pharmacy.  If lab work is needed we can place an order for that and you can then stop by our lab to have the test done at a later time.    Telephone visits are billed at different rates depending on your insurance coverage. During this emergency period, for some insurers they may be billed the same as an in-person visit.  Please reach out to your insurance provider with any questions.    If during the course of the call the physician/provider feels a telephone visit is not appropriate, you will not be charged for this service.\"    Patient has given verbal consent for Telephone visit?  Yes    What phone number would you like to be contacted at? [phone]    How would you like to obtain your AVS? Jarrod Jacobo     Steve Morgan is a 60 year old male who presents via phone visit today for the following health issues:      Name: Steve \"Caio\" Cathy  F/u for Diabetes (Last seen 3/12/2020).  HPI:  1. Type 2 DM:  Orginally diagnosed at the age of 33 or 38.  Was prediabetic prior, was not particularly symptomatic at the time, was noted on routine lab work    Current Regimen: Metformin 1000 mg qd, Levemir 32 units every day. Novolog 1:9 + 1:50>150 (has a printed " correction scale).  Glucose high overnight due to evening high-carb snacking.  Tries to eat low carb snacks but isn't doing very well.  The plan was to start Trulicity but the copay was not affordable ($100 per refill).    Has been unable to tolerate a higher dose of Metformin due to diarrhea.  Still has some diarrhea at the 1000 mg/day dose.    BS checks: continuously via Nina  Nina Download: Average ; 75% above target range, 25% in target range.  0% below range.  BG target range .  Unable to use glucose meter - he has a hard time doing fingerstick testing due to his tremor.    Last diabetes ed appointment:  4/9/2020.        Complications:   Diabetes Complications  Description / Detail    Diabetic Retinopathy  No retinopathy,last exam 2/2020, [facility]   CAD / PAD  No   Neuropathy  Yes + diabetic neuropathy: numbness hands and feet.  Saw podiatry, Dr. Mathis, 1/27/2018- using diabetic shoes   Nephropathy / Microalbuminuria  Yes, elevated urine microalbumin   Gastroparesis  No   Hypoglycemia [place]  Not sure, gets 'kind of dizzy' when blood sugar is low but reports history of low blood sugars without symptoms     2. Hypertension: Blood Pressure:   BP Readings from Last 3 Encounters:   05/11/20 132/78   03/12/20 128/82   02/11/20 138/80   Blood pressure medications include atenolol 25 mg qd and losartan 50 mg every day.   3. Hyperlipidemia: Takes simvastatin 20 mg for lipid control.       4. Prevention:  Flu Shot- 10/2019  Pneumovax- 2012  Opthalmology-Yes: annually  Dental-Yes: twice a year  ASA-Yes, 81 mg qd   Smoking- No  5.  Hypothyroidism. Currently treated with levoxyl (KAMERON) 125 mcg daily. Takes the levoxyl first thing in the morning and waits 30+ minutes before eating breakfast.  PA for Levoxyl approved through 4/7/2021.  PMH/PSH:  Past Medical History:   Diagnosis Date     Cellulitis and abscess of trunk 6/27/2017     Depression      Hyperlipidemia LDL goal <100 10/31/2010     Hypertension  goal BP (blood pressure) < 140/90 3/17/2011     Hypothyroidism 1/12/2010     Morbid obesity due to excess calories (H) 1/12/2010     Neuropathy in diabetes (H) 1/12/2010     Obesity 1/12/2010     Sleep apnea 1/12/2010    CPAP     Type 2 diabetes, HbA1C goal < 8% (H) 3/8/2011     Past Surgical History:   Procedure Laterality Date     BIOPSY       COLONOSCOPY       EYE SURGERY       GENITOURINARY SURGERY      surg for undescended testicle     REPAIR HAMMER TOE BILATERAL  5/16/2013    Procedure: REPAIR HAMMER TOE BILATERAL;  Flexor Tenotomy Toes 2,3,4,5 Bilateral Feet;  Surgeon: Saad Bangura DPM;  Location: [facility] OR     Family Hx:  Family History   Problem Relation Age of Onset     Breast Cancer Mother      Hypertension Mother      Thyroid Disease Mother      Depression Mother      Alzheimer Disease Mother 82     Cancer - colorectal Father      Thyroid Disease Father      Depression Father      Other - See Comments Father         bladder polyps     Heart Disease Maternal Grandmother         CHF     Circulatory Paternal Grandmother      Cancer Paternal Grandfather      Diabetes Brother      Diabetes Brother      Asthma Brother      Thyroid disease: Yes: mother         DM2: Yes: one brother with type 1 diabetes and one brother with type 2 diabetes, paternal aunt with DM2         Autoimmune: DM1, SLE, RA, Vitiligo Yes: brother with DM1    Social Hx:  Social History     Socioeconomic History     Marital status:      Spouse name: Sri     Number of children: 2     Years of education: Not on file     Highest education level: Associate degree: occupational, technical, or vocational program   Occupational History     Occupation:      Employer: NONE      Comment: [employer]   Social Needs     Financial resource strain: Not hard at all     Food insecurity     Worry: Never true     Inability: Never true     Transportation needs     Medical: No     Non-medical: No   Tobacco Use     Smoking status: Never  Smoker     Smokeless tobacco: Never Used   Substance and Sexual Activity     Alcohol use: Yes     Frequency: Monthly or less     Drinks per session: 1 or 2     Binge frequency: Never     Comment: Occassionally     Drug use: No     Sexual activity: Not Currently     Partners: Female     Birth control/protection: Abstinence   Lifestyle     Physical activity     Days per week: 0 days     Minutes per session: 0 min     Stress: Not at all   Relationships     Social connections     Talks on phone: Twice a week     Gets together: Not on file     Attends [employer] service: Never     Active member of club or organization: Yes     Attends meetings of clubs or organizations: 1 to 4 times per year     Relationship status:      Intimate partner violence     Fear of current or ex partner: Not on file     Emotionally abused: Not on file     Physically abused: Not on file     Forced sexual activity: Not on file   Other Topics Concern     Parent/sibling w/ CABG, MI or angioplasty before 65F 55M? No   Social History Narrative     Not on file          MEDICATIONS:  has a current medication list which includes the following prescription(s): ammonium lactate, ammonium lactate, aspirin, atenolol, bupropion, calcium carb-cholecalciferol, citalopram, freestyle juan 14 day reader, freestyle juan 14 day sensor, cyanocobalamin, ferrous sulfate, finasteride, hydrocortisone, insulin detemir, insulin pen needle, levoxyl, losartan, metformin, multi-vitamin, mupirocin, novolog flexpen, nystatin, omeprazole-sodium bicarbonate, simvastatin, and cholecalciferol.    ROS     ROS: 10 point ROS neg other than the symptoms noted above in the HPI.    Objective   Reported vitals:  There were no vitals taken for this visit.   healthy, alert and no distress  PSYCH: Alert and oriented times 3; coherent speech, normal   rate and volume, able to articulate logical thoughts, able   to abstract reason, no tangential thoughts, no hallucinations   or  "delusions  His affect is normal and pleasant  RESP: No cough, no audible wheezing, able to talk in full sentences  Remainder of exam unable to be completed due to telephone visits    LABS:  A1c:   Component      Latest Ref Rng & Units 12/14/2018 8/29/2019 12/5/2019 3/12/2020   Hemoglobin A1C      0 - 5.6 % 11.8 (H) 11.6 (H) 11.7 (H) 9.9 (H)     Basic Metabolic Panel:  !COMPREHENSIVE Latest Ref Rng & Units 10/24/2019   SODIUM 133 - 144 mmol/L 129 (L)   POTASSIUM 3.4 - 5.3 mmol/L 5.0   CHLORIDE 94 - 109 mmol/L 98   BUN 7 - 30 mg/dL 17   Creatinine 0.66 - 1.25 mg/dL 1.09   Glucose 70 - 99 mg/dL 285 (H)   ANION GAP 3 - 14 mmol/L 5   CALCIUM 8.5 - 10.1 mg/dL 8.8     Component      Latest Ref Rng & Units 8/29/2019   Glucose 316   C-Peptide      0.9 - 6.9 ng/mL 2.5     LFTS/Cholesterol Panel:  !LIPID/HEPATIC Latest Ref Rng & Units 8/29/2019   CHOLESTEROL <200 mg/dL 192   TRIGLYCERIDES <150 mg/dL 186 (H)   HDL CHOLESTEROL >39 mg/dL 34 (L)   LDL CHOLESTEROL, CALCULATED <100 mg/dL 121 (H)   VLDL-CHOLESTEROL 0 - 30 mg/dL    NON HDL CHOLESTEROL <130 mg/dL 158 (H)   CHOLESTEROL/HDL RATIO 0.0 - 5.0    AST 0 - 45 U/L 18   ALT 0 - 70 U/L 28     Thyroid Function:   !THYROID Latest Ref Rng & Units 3/5/2020 12/5/2019 12/14/2018   TSH 0.40 - 4.00 mU/L 0.55 5.20 (H) 2.19   T4 FREE 0.76 - 1.46 ng/dL  1.19      Component    Latest Ref Rng & Units 10/19/2018   Thyroid Peroxidase Antibody    <35 IU/mL 11   Thyroglobulin Antibody    <40 IU/mL <20   Thyroid Stim Immunog    <=1.3 TSI index <1.0     Urine MicroAlbumin:  Component      Latest Ref Rng & Units 3/5/2020   Creatinine Urine      mg/dL 18   Albumin Urine mg/L      mg/L 14   Albumin Urine mg/g Cr      0 - 17 mg/g Cr 81.46 (H)       Vital Signs 4/2/2019 4/17/2019 8/29/2019   Weight (LB) 223 lb 236 lb 243 lb 3.2 oz   Height 5' 5\"  5' 6\"   BMI (Calculated) 37.11  39.25       All pertinent notes, labs, and images personally reviewed by me.     A/P  Mr.Walter Morgan is a 59 year old " evaluated via phone visit for the management of:    1. DM2 - Uncontrolled. Recent blood sugars deteriorating in control, average glucose increased from 161 (4/2020) to 229 today.   Dietary indiscretions contributing factor but also noted to have low insulin production based on a cpeptide of 2.5 with a glucose >300.  Diabetes is complicated by neuropathy and nephropathy/elevated urine microalbumin.    He has challenges with blood sugar testing due to limited use of his hands (neuropathy + tremor - both significant) and increasing difficulty with ADL's   Can't test his blood sugars using a meter due to the above.   He also has an extremely hard time inserting the Nina sensors due to neuropathy and tremor.    Continue Metformin XR 1000 mg daily.  Continue Levemir 32 units once daily  Continue Novolog insulin to carb ratio to 1:9 and continue current correction 1:50>150.  Encouraged to eat low carb evening snacks however, if eating higher carb evening snack he can try covering the snack with a low dose of Novolog at 1 unit per 20 grams.  Follow up with TOSHA Ramirez - new referral placed today.    2. Hypothyroidism.  Currently treated with Levoxyl (KAMERON) 125 mcg/day.  Clinically and biochemically euthyroid. Continue Levoxyl 125 mcg/day.     3. Hyperlipidemia - On statin therapy.      Labs ordered today:   Orders Placed This Encounter   Procedures     AMBULATORY ADULT DIABETES EDUCATOR REFERRAL       Radiology/Consults ordered today: AMBULATORY ADULT DIABETES EDUCATOR REFERRAL       Follow-up:  3-4 months with me    Diana Butler NP  Endocrinology  [facility]  CC: Lisa Pierre Mary, CDE    Phone call duration:  22 minutes.  Call start time: 2:04 pm; call end time: 2:26 pm.         Again, thank you for allowing me to participate in the care of your patient.        Sincerely,        BRIDGET Payne CNP

## 2020-06-17 NOTE — Clinical Note
Caio Castrejon's control has deteriorated so I think he needs some more help with diet and insulin dosing.  I put in a new referral.  Thanks.

## 2020-06-23 ENCOUNTER — MYC MEDICAL ADVICE (OUTPATIENT)
Dept: SLEEP MEDICINE | Facility: CLINIC | Age: 60
End: 2020-06-23

## 2020-06-26 ENCOUNTER — CARE COORDINATION (OUTPATIENT)
Dept: SLEEP MEDICINE | Facility: CLINIC | Age: 60
End: 2020-06-26

## 2020-06-26 NOTE — PROGRESS NOTES
Caio had left [employer] message 6/23/2020 asking what he could do to get his replacement machine.  [facility] had [facility] on [facility].  [facility] called [facility] spoke to [facility] who looked at his file.  Appears sleep test done 2010 at [facility] split night shows AHI of only 1.6 /hr. RDI 33.1.  They do use both numbers but the ahi is below 5.  They have requested any other testing done by [facility].  I read through chart and saw note Caio had sleep study done at [facility] 2000 or 2001.  Called [facility], and sleep study done October 3, 2002, which was scanned in 12/26/2007.  I faxed request for those paper records 6/25/2020.      Spoke with Caio today, and explained what was going on with [facility] and insurance with the ahi of 1.6 on his sleep study 2010.  Explained [facility] had asked [facility] for any other testing.  Explained I had requested the records from [facility] and hopefully, [facility] and insurance would be able to use those.  Otherwise we may need to have him do another test to qualify for a replacement cpap.  Explained that Caio would have to not use his cpap for 2-3 nights before the testing.  He has Medicare/BCBS supplement.

## 2020-07-16 ENCOUNTER — TELEPHONE (OUTPATIENT)
Dept: SLEEP MEDICINE | Facility: CLINIC | Age: 60
End: 2020-07-16

## 2020-07-16 NOTE — TELEPHONE ENCOUNTER
Returned pt call to give an update on the pt transferring to us for dme services. We are still waiting on information from [facility] that was requested on 07/13/2020. Let the pt know that we need to received all the necessary information required before the pt insurance company will cover the cost and once complete [facility] transfer team will reach out.

## 2020-07-23 ENCOUNTER — PATIENT OUTREACH (OUTPATIENT)
Dept: EDUCATION SERVICES | Facility: CLINIC | Age: 60
End: 2020-07-23
Payer: MEDICARE

## 2020-07-23 DIAGNOSIS — Z53.9 NO SHOW: Primary | ICD-10-CM

## 2020-07-23 NOTE — PROGRESS NOTES
Attempted to reach for scheduled appt x 2. Left voice mails to reschedule.     Lucía Chávez RD, CDE  Diabetes

## 2020-07-31 NOTE — LETTER
"    12/14/2018         RE: Steve Morgan  [id number]  [facility] [address]        Dear Colleague,    Thank you for referring your patient, Steve Morgan, to the [facility]. Please see a copy of my visit note below.    Name: Steve \"Caio\"Cathy  Seen at the request of Lisa Pierre for Diabetes (Last seen 9/7/2018).  HPI:  Steve Morgan is a 58 year old male who presents for the evaluation/management of:    1. Type 2 DM:  Orginally diagnosed at the age of 33 or 38.  Was prediabetic prior, was not particularly symptomatic at the time, was noted on routine lab work    Current Regimen: Metformin 1000 mg bid, glipzide 20 mg q am, Lantus 26 units qd (started 5/2017)     BS checks: None - not wearing Nina since April.    Meter Download:     Elevated blood sugar due to continued dietary indiscretions, states he hasn't been watching his diet as carefully.  Likes to snack between meals, buys snacks at the gas station.      Complications:   Diabetes Complications  Description / Detail    Diabetic Retinopathy  No retinopathy, early cataract, last exam 7/2018.  Cataract surgery 9/2018   CAD / PAD  No   Neuropathy  Yes + diabetic neuropathy: numbness hands and feet.  Saw podiatry, Dr. Mathis, 1/27/2018- using diabetic shoes   Nephropathy / Microalbuminuria  No   Gastroparesis  No   Hypoglycemia [place]  Not sure, gets 'kind of dizzy' when blood sugar is low but reports history of low blood sugars without symptoms     2. Hypertension: Blood Pressure today:   BP Readings from Last 3 Encounters:   12/14/18 148/83   11/27/18 126/74   11/19/18 122/76   .  Blood pressure medications include atenolol 25 mg qd and losartan 50 mg qd  .  Takes medications everyday without forgetting a dose.  Denies feeling lightheaded or dizzy.   3. Hyperlipidemia: Takes simvastatin 20 mg for lipid control.  Denies muscle aches of pains.       4. Prevention:  Flu Shot- 9/2018  Pneumovax- 2012  Opthalmology-Yes: " annually  Dental-Yes: twice a year  ASA-Yes, 81 mg qd   Smoking- No  5.  Hypothyroidism. Currently treated with levoxyl 125 mcg daily.  This dose was decreased 3 months ago due to low TSH and elevated T4. Takes the levothyroxine first thing in the morning and waits 30+ minutes before eating breakfast.    PMH/PSH:  Past Medical History:   Diagnosis Date     Cellulitis and abscess of trunk 6/27/2017     Depression      Hyperlipidemia LDL goal <100 10/31/2010     Hypertension goal BP (blood pressure) < 140/90 3/17/2011     Hypothyroidism 1/12/2010     Morbid obesity due to excess calories (H) 1/12/2010     Neuropathy in diabetes (H) 1/12/2010     Obesity 1/12/2010     Sleep apnea 1/12/2010    CPAP     Type 2 diabetes, HbA1C goal < 8% (H) 3/8/2011     Past Surgical History:   Procedure Laterality Date     COLONOSCOPY       GENITOURINARY SURGERY      surg for undescended testicle     REPAIR HAMMER TOE BILATERAL  5/16/2013    Procedure: REPAIR HAMMER TOE BILATERAL;  Flexor Tenotomy Toes 2,3,4,5 Bilateral Feet;  Surgeon: Saad Bangura DPM;  Location: [facility] OR     Family Hx:  Family History   Problem Relation Age of Onset     Breast Cancer Mother      Hypertension Mother      Thyroid Disease Mother      Depression Mother      Cancer - colorectal Father      Thyroid Disease Father      Depression Father      Heart Disease Maternal Grandmother         CHF     Circulatory Paternal Grandmother      Cancer Paternal Grandfather      Diabetes Brother      Diabetes Brother      Thyroid disease: Yes: mother         DM2: Yes: one brother with type 1 diabetes and one brother with type 2 diabetes, paternal aunt with DM2         Autoimmune: DM1, SLE, RA, Vitiligo Yes: brother with DM1    Social Hx:  Social History     Socioeconomic History     Marital status:      Spouse name: Sri     Number of children: 2     Years of education: Not on file     Highest education level: Not on file   Social Needs     Financial resource  strain: Not on file     Food insecurity - worry: Not on file     Food insecurity - inability: Not on file     Transportation needs - medical: Not on file     Transportation needs - non-medical: Not on file   Occupational History     Occupation:      Employer: NONE      Comment: [employer]   Tobacco Use     Smoking status: Never Smoker     Smokeless tobacco: Never Used   Substance and Sexual Activity     Alcohol use: Yes     Comment: occ     Drug use: No     Sexual activity: Not Currently     Partners: Female   Other Topics Concern     Parent/sibling w/ CABG, MI or angioplasty before 65F 55M? No   Social History Narrative     Not on file          MEDICATIONS:  has a current medication list which includes the following prescription(s): freestyle juan 14 day reader, freestyle juan 14 day sensor, insulin glargine, ammonium lactate, aspirin, atenolol, blood glucose monitoring, blood glucose monitoring, blood glucose monitoring, blood glucose monitoring, bupropion, calcium carb-cholecalciferol, citalopram, cyanocobalamin, diclofenac, ferrous sulfate, finasteride, glipizide, hydrocortisone, insulin glargine, insulin pen needle, levothyroxine, losartan, metformin, multi-vitamin, omeprazole-sodium bicarbonate, order for dme, order for dme, order for dme, order for dme, simvastatin, UNABLE TO FIND, and cholecalciferol, and the following Facility-Administered Medications: cefazolin.    ROS     ROS: 10 point ROS neg other than the symptoms noted above in the HPI.    Physical Exam   VS: /83   Pulse 66   Temp 97.5  F (36.4  C) (Oral)   Wt 106.9 kg (235 lb 9.6 oz)   SpO2 99%   BMI 39.21 kg/m     GENERAL: AXOX3, NAD, well dressed, answering questions appropriately, appears stated age.  HEENT: no exopthalmous, no proptosis, no lig lag, no retraction  CV: RRR, no rubs, gallops, no murmurs  LUNGS: CTAB, no wheezes, rales, or ronchi  EXTREMITIES: trace edema at the ankles, feet with slightly diminished pulses,  10-gram plantar sensation absent, scattered calluses bilaterally, no open lesion.  NEUROLOGY: CN grossly intact, no tremors  MSK: grossly intact  SKIN: no rashes, no lesions    LABS:  A1c:   Component      Latest Ref Rng & Units 9/7/2017 12/8/2017 12/14/2018   Hemoglobin A1C      0 - 5.6 % 8.5 (H) 9.0 (H) 11.8 (H)     Basic Metabolic Panel:  !COMPREHENSIVE Latest Ref Rng & Units 9/18/2018   SODIUM 133 - 144 mmol/L 133   POTASSIUM 3.4 - 5.3 mmol/L 5.3   CHLORIDE 94 - 109 mmol/L 99   BUN 7 - 30 mg/dL 15   Creatinine 0.66 - 1.25 mg/dL 1.08   Glucose 70 - 99 mg/dL 218 (H)   ANION GAP 3 - 14 mmol/L 9   CALCIUM 8.5 - 10.1 mg/dL 9.2   ALBUMIN 3.4 - 5.0 g/dL 3.9     LFTS/Cholesterol Panel:  !LIPID/HEPATIC Latest Ref Rng & Units 9/18/2018   CHOLESTEROL <200 mg/dL 188   TRIGLYCERIDES <150 mg/dL 219 (H)   HDL CHOLESTEROL >39 mg/dL 30 (L)   LDL CHOLESTEROL, CALCULATED <100 mg/dL 114 (H)   VLDL-CHOLESTEROL 0 - 30 mg/dL    NON HDL CHOLESTEROL <130 mg/dL 158 (H)   CHOLESTEROL/HDL RATIO 0.0 - 5.0    AST 0 - 45 U/L 23   ALT 0 - 70 U/L 30     Thyroid Function:   !THYROID Latest Ref Rng & Units 10/19/2018 9/7/2018   TSH 0.40 - 4.00 mU/L 0.30 (L) 0.06 (L)   T4 FREE 0.76 - 1.46 ng/dL 1.46 1.61 (H)     Component    Latest Ref Rng & Units 10/19/2018   Thyroid Peroxidase Antibody    <35 IU/mL 11   Thyroglobulin Antibody    <40 IU/mL <20   Thyroid Stim Immunog    <=1.3 TSI index <1.0     Urine MicroAlbumin:  Component    Latest Ref Rng & Units 12/8/2017   Creatinine Urine    mg/dL 39   Albumin Urine mg/L    17   Albumin Urine mg/g Cr    0 - 17 mg/g Cr 43.15 (H)     Vitamin D:    All pertinent notes, labs, and images personally reviewed by me.     A/P  Mr.Walter Morgan is a 57 year old here for the evaluation/management of diabetes:    1. DM2 - Uncontrolled.  A1c increased to 11.8%!    Diabetes is complicated by neuropathy  No blood sugar data for review today.  C/o limited use of his hands (neuropathy + tremor - both significant) and  Information: Selecting Yes will display possible errors in your note based on the variables you have selected. This validation is only offered as a suggestion for you. PLEASE NOTE THAT THE VALIDATION TEXT WILL BE REMOVED WHEN YOU FINALIZE YOUR NOTE. IF YOU WANT TO FAX A PRELIMINARY NOTE YOU WILL NEED TO TOGGLE THIS TO 'NO' IF YOU DO NOT WANT IT IN YOUR FAXED NOTE. increasing difficulty with ADL's - referral to [facility] for hand therapy previously provided but hasn't scheduled yet.  Can't test his blood sugars using a meter due to the above.  Needs to start wearing the Nina.  Plan to upgrade to 14-day sensor  Increase Lantus to 30 units every day  Continue metformin 1000 mg bid and glipizide 20 mg q am.  Follow up in one month with diabetes ed to review blood sugars, review diet recommendations, and make insulin dose adjustments.      There is some variability among people, most will usually develop symptoms suggestive of hypoglycemia when blood glucose levels are lowered to the mid 60's. The first set of symptoms are called adrenergic. Patients may experience any of the following nervousness, sweating, intense hunger, trembling, weakness, palpitations, and difficulty speaking.   The acute management of hypoglycemia involves the rapid delivery of a source of easily absorbed sugar. Regular soda, juice, lifesavers, table sugar, are good options. 15 grams of glucose is the dose that is given, followed by an assessment of symptoms and a blood glucose check if possible. If after 10 minutes there is no improvement, another 10-15 grams should be given. This can be repeated up to three times. The equivalency of 10-15 grams of glucose (approximate servings) are: 3-5 hard candies, 3 teaspoons of sugar, or 1/2 cup of regular soda or juice.      2. Hypothyroidism.  Currently treated with Levoxyl (KAMERON) 125 mcg/day.  Will obtain TFT's today and adjust levoxyl dose if indicated.    3.  Hypertension - Variable.      4. Hyperlipidemia - On statin therapy.    Labs ordered today:   Orders Placed This Encounter   Procedures     FOOT EXAM     Hemoglobin A1c     Albumin Random Urine Quantitative with Creat Ratio     TSH with free T4 reflex     DIABETES EDUCATOR REFERRAL       Radiology/Consults ordered today: C FOOT EXAM  NO CHARGE  DIABETES EDUCATOR REFERRAL    All questions were answered.  The  Validate Anticipated Plan: No patient indicates understanding of the above issues and agrees with the plan set forth.  Total face to face time greater than or equal to 25 minutes.       Follow-up:  See diabetes ed 1 month to review Nina   3 months with me    Diana Butler NP  Endocrinology  [facility]  CC: Lisa Pierre        Again, thank you for allowing me to participate in the care of your patient.        Sincerely,        BRIDGET Payne CNP     Notification Instructions: Pt may call office in 1 to 2 weeks for biopsy results.  If treatment is needed, pt will be notified of biopsy results Silver Nitrate Text: The wound bed was treated with silver nitrate after the biopsy was performed. Depth Of Biopsy: dermis X Size Of Lesion In Cm: 0 Cryotherapy Text: The wound bed was treated with cryotherapy after the biopsy was performed. Hemostasis: Drysol Consent: Written consent was obtained and risks were reviewed including but not limited to scarring, infection, bleeding, scabbing, incomplete removal, nerve damage and allergy to anesthesia. Dressing: bandage Detail Level: Detailed Post-Care Instructions: I reviewed with the patient in detail post-care instructions. Patient is to keep the biopsy site dry overnight, and then apply bacitracin twice daily until healed. Patient may apply hydrogen peroxide soaks to remove any crusting. Electrodesiccation And Curettage Text: The wound bed was treated with electrodesiccation and curettage after the biopsy was performed. Size Of Lesion In Cm: 0.4 Billing Type: [employer] Anesthesia Volume In Cc (Will Not Render If 0): 2 Anesthesia Type: 1% lidocaine without epinephrine Type Of Destruction Used: Curettage Was A Bandage Applied: Yes Wound Care: Polysporin ointment Biopsy Method: double edge Personna blade Electrodesiccation Text: The wound bed was treated with electrodesiccation after the biopsy was performed. Biopsy Type: H and E

## 2020-08-04 ENCOUNTER — TRANSFERRED RECORDS (OUTPATIENT)
Dept: HEALTH INFORMATION MANAGEMENT | Facility: CLINIC | Age: 60
End: 2020-08-04

## 2020-08-04 LAB — RETINOPATHY: NEGATIVE

## 2020-08-08 DIAGNOSIS — B37.2 YEAST INFECTION OF THE SKIN: ICD-10-CM

## 2020-08-10 RX ORDER — NYSTATIN 100000 U/G
CREAM TOPICAL
Qty: 45 G | Refills: 0 | Status: SHIPPED | OUTPATIENT
Start: 2020-08-10 | End: 2020-10-06

## 2020-08-14 ENCOUNTER — DOCUMENTATION ONLY (OUTPATIENT)
Dept: SLEEP MEDICINE | Facility: CLINIC | Age: 60
End: 2020-08-14
Payer: MEDICARE

## 2020-08-14 NOTE — PROGRESS NOTES
Patient was offered choice of vendor and chose [facility].  Patient Steve Morgan was set up at [place] on August 14, 2020. Patient received a [employer] AirSense 10 Auto. Pressures were set at 7-15 cm H2O.   Patient s ramp is  Off and FLEX/EPR is EPR, 2.  Patient received a [email or web address] Mask name: Eson 2  Nasal mask size Large, heated tubing and heated humidifier.  Patient does need to meet compliance. Patient has a follow up on 11/17/20 with Jasmyn Argueta PA-C .    Leonie Pedraza

## 2020-08-17 ENCOUNTER — DOCUMENTATION ONLY (OUTPATIENT)
Dept: SLEEP MEDICINE | Facility: CLINIC | Age: 60
End: 2020-08-17
Payer: MEDICARE

## 2020-08-17 NOTE — PROGRESS NOTES
3 DAY STM VISIT    Diagnostic AHI:   PSG    Patient contacted for 3 day STM visit.    Confirmed with patient at time of call- Yes Patient is still interested in STM service     Subjective measures:  Patient hasn't set up his new machine.     Replacement device: Yes  STM ordered by provider: Yes     Device type: Auto-CPAP  PAP settings from order::  CPAP min 7 cm  H20       CPAP max 15 cm  H20  Mask type:    Nasal Mask     Device settings from machine      Min CPAP 7            Max CPAP 15            Assessment: No usage reporting but has a profile in [employer].  Action plan: Patient to have 14 day STM visit. Patient has a follow up visit scheduled:   yes within 31-90 days of set up.     Total time spent on accessing and  interpreting remote patient PAP therapy data  10 minutes  Total time spent counseling, coaching  and reviewing PAP therapy data with patient  5 minutes  11234 no

## 2020-08-19 NOTE — PROGRESS NOTES
Patient called back he is having trouble with the clip of his Eson mask. Patient also wants 10 foot  Tubing, told to patient to try his mask for a week and if is still having problems to call us.

## 2020-08-31 ENCOUNTER — DOCUMENTATION ONLY (OUTPATIENT)
Dept: SLEEP MEDICINE | Facility: CLINIC | Age: 60
End: 2020-08-31

## 2020-08-31 NOTE — PROGRESS NOTES
14  DAY STM VISIT       Data only recheck -replacement     Assessment: Pt meeting objective benchmarks.       Action plan: pt to have 6 month STM visit      Device type: Auto-CPAP    PAP settings: CPAP min 7.0 cm  H20       CPAP max 15.0 cm  H20         95th% pressure 11.9 cm  H20        RESMED EPR level Setting: TWO    RESMED Soft response setting:  OFF    Mask type:  Nasal Mask    Objective measures: 14 day rolling measures      Compliance  100 %      Leak  17.57  lpm  last  upload      AHI 1.2   last  upload      Average number of minutes 533      Objective measure goal  Compliance   Goal >70%  Leak   Goal < 24 lpm  AHI  Goal < 5  Usage  Goal >240        Total time spent on accessing and interpreting remote patient PAP therapy data  10 minutes    Total time spent counseling, coaching  and reviewing PAP therapy data with patient  0 minutes    [id number]  [id number]  no (3 day STM)

## 2020-09-01 ASSESSMENT — PATIENT HEALTH QUESTIONNAIRE - PHQ9
SUM OF ALL RESPONSES TO PHQ QUESTIONS 1-9: 17
10. IF YOU CHECKED OFF ANY PROBLEMS, HOW DIFFICULT HAVE THESE PROBLEMS MADE IT FOR YOU TO DO YOUR WORK, TAKE CARE OF THINGS AT HOME, OR GET ALONG WITH OTHER PEOPLE: VERY DIFFICULT
SUM OF ALL RESPONSES TO PHQ QUESTIONS 1-9: 17

## 2020-09-01 ASSESSMENT — ANXIETY QUESTIONNAIRES
5. BEING SO RESTLESS THAT IT IS HARD TO SIT STILL: NOT AT ALL
4. TROUBLE RELAXING: SEVERAL DAYS
1. FEELING NERVOUS, ANXIOUS, OR ON EDGE: NEARLY EVERY DAY
7. FEELING AFRAID AS IF SOMETHING AWFUL MIGHT HAPPEN: NOT AT ALL
2. NOT BEING ABLE TO STOP OR CONTROL WORRYING: MORE THAN HALF THE DAYS
6. BECOMING EASILY ANNOYED OR IRRITABLE: NEARLY EVERY DAY
GAD7 TOTAL SCORE: 11
3. WORRYING TOO MUCH ABOUT DIFFERENT THINGS: MORE THAN HALF THE DAYS
7. FEELING AFRAID AS IF SOMETHING AWFUL MIGHT HAPPEN: NOT AT ALL
GAD7 TOTAL SCORE: 11

## 2020-09-01 NOTE — PROGRESS NOTES
Pre-Visit Planning   See notes.    Future Appointments   Date Time Provider Department Center   9/2/2020 10:15 AM Lisa Pierre PA-C [facility] CR   9/8/2020  3:15 PM Eleazar Zafar DPM [facility] - [facility]   10/23/2020 10:00 AM Diana Butler APRN CNP CRE CR   11/17/2020 11:00 AM Jasmyn Argueta PA-C [facility] SLEEP     Arrival Time for this Appointment: 10:00 AM   Appointment Notes for this encounter:   Video  visit discuss lab results / memory ph:[phone]  Life line screening a few years ago.  Wants to see if you would advise on it  Behavioral health referral  Memory test.  Influenza shot?    Questionnaires Reviewed/Assigned  No additional questionnaires are needed  Last OV with provider  01/16/2020 OV [facility] H&P    Hospital ER Visits  None  Specialty Visits  Endo  Podiatry  Sleep study-CPAP    Imaging and Lab Review  Mult.  See epic  Recent Procedures  none    Health Maintenance Due   Topic Date Due     HEPATITIS B IMMUNIZATION (1 of 3 - Risk 3-dose series) 02/28/1977     ZOSTER IMMUNIZATION (1 of 2) 02/28/2008     PHQ-9  04/24/2020     A1C  06/12/2020     INFLUENZA VACCINE (1) 09/01/2020     Current Outpatient Medications   Medication     ammonium lactate (AMLACTIN) 12 % external cream     ammonium lactate (LAC-HYDRIN) 12 % cream     ASPIRIN 81 MG OR TABS     atenolol (TENORMIN) 25 MG tablet     buPROPion (WELLBUTRIN XL) 300 MG 24 hr tablet     Calcium Carb-Cholecalciferol (CALCIUM 600 + D PO)     citalopram (CELEXA) 40 MG tablet     Continuous Blood Gluc  (FREESTYLE GIULIANA 14 DAY READER) SIENA     Continuous Blood Gluc Sensor (FREESTYLE GIULIANA 14 DAY SENSOR) MISC     Cyanocobalamin (VITAMIN B 12 PO)     ferrous sulfate 325 (65 FE) MG tablet     finasteride (PROSCAR) 5 MG tablet     hydrocortisone (WESTCORT) 0.2 % cream     insulin detemir (LEVEMIR FLEXTOUCH) 100 UNIT/ML pen     insulin pen needle (B-D U/F) 31G X 5 MM miscellaneous     LEVOXYL 137 MCG tablet     losartan (COZAAR) 50 MG  "tablet     metFORMIN (GLUCOPHAGE-XR) 500 MG 24 hr tablet     MULTI-VITAMIN OR TABS     mupirocin (BACTROBAN) 2 % external ointment     NOVOLOG FLEXPEN 100 UNIT/ML soln     nystatin (MYCOSTATIN) 840447 UNIT/GM external cream     omeprazole-sodium bicarbonate (ZEGERID)  MG per capsule     simvastatin (ZOCOR) 20 MG tablet     VITAMIN D, CHOLECALCIFEROL, PO     No current facility-administered medications for this visit.      No change in medications and no refills due  My chart active?  Yes  Jeannie Duran RN  Spoke to pt    Patient preferred phone number: [phone]    Steve Morgan is a 60 year old male who is being evaluated via a billable video visit.      The patient has been notified of following:     \"This video visit will be conducted via a call between you and your physician/provider. We have found that certain health care needs can be provided without the need for an in-person physical exam.  This service lets us provide the care you need with a video conversation.  If a prescription is necessary we can send it directly to your pharmacy.  If lab work is needed we can place an order for that and you can then stop by our lab to have the test done at a later time.    Video visits are billed at different rates depending on your insurance coverage.  Please reach out to your insurance provider with any questions.    If during the course of the call the physician/provider feels a video visit is not appropriate, you will not be charged for this service.\"    Patient has given verbal consent for Video visit? Yes  How would you like to obtain your AVS? MyChart  If you are dropped from the video visit, the video invite should be resent to: Text to cell phone: [phone]  Will anyone else be joining your video visit? No    Subjective     Steve Morgan is a 60 year old male who presents today via video visit for the following health issues:    HPI    Depression Followup    How are you doing with your depression " since your last visit? No change    Are you having other symptoms that might be associated with depression? Yes:  anger, irritability, road rage    Have you had a significant life event?  OTHER: family health concerns     Are you feeling anxious or having panic attacks?   No    Do you have any concerns with your use of alcohol or other drugs? No     Social History     Tobacco Use     Smoking status: Never Smoker     Smokeless tobacco: Never Used   Substance Use Topics     Alcohol use: Yes     Frequency: Monthly or less     Drinks per session: 1 or 2     Binge frequency: Never     Comment: Occassionally     Drug use: No     PHQ 10/24/2019 9/1/2020 9/1/2020   PHQ-9 Total Score 13 17 17   Q9: Thoughts of better off dead/self-harm past 2 weeks Not at all Not at all Not at all     JOSE MANUEL-7 SCORE 10/24/2019 9/1/2020 9/1/2020   Total Score - - -   Total Score 4 (minimal anxiety) - 11 (moderate anxiety)   Total Score 4 11 11     Last PHQ-9 9/1/2020   1.  Little interest or pleasure in doing things 3   2.  Feeling down, depressed, or hopeless 3   3.  Trouble falling or staying asleep, or sleeping too much 3   4.  Feeling tired or having little energy 3   5.  Poor appetite or overeating 3   6.  Feeling bad about yourself 1   7.  Trouble concentrating 0   8.  Moving slowly or restless 1   Q9: Thoughts of better off dead/self-harm past 2 weeks 0   PHQ-9 Total Score 17   Difficulty at work, home, or with people -     JOSE MANUEL-7  9/1/2020   1. Feeling nervous, anxious, or on edge 3   2. Not being able to stop or control worrying 2   3. Worrying too much about different things 2   4. Trouble relaxing 1   5. Being so restless that it is hard to sit still 0   6. Becoming easily annoyed or irritable 3   7. Feeling afraid, as if something awful might happen 0   JOSE MANUEL-7 Total Score 11   If you checked any problems, how difficult have they made it for you to do your work, take care of things at home, or get along with other people? -     In the  past two weeks have you had thoughts of suicide or self-harm?  No.    Do you have concerns about your personal safety or the safety of others?   No    Suicide Assessment Five-step Evaluation and Treatment (SAFE-T)      Concern - Memory concerns  Onset: X 1 year  Description: problems with short term memory, asking repetitive questions, possibly long term but isn't sure.  Sometimes walks into room and wondering why he went in there.  Driving, has to think about where he's going  Intensity: mild, moderate  Progression of Symptoms:  worsening  Accompanying Signs & Symptoms: none  Previous history of similar problem: Yes  Precipitating factors:        Worsened by: none  Alleviating factors:        Improved by: writing things down helps  Therapies tried and outcome: Patient notes he has discussed this previously with Lisa and a referral was placed, but was unable to follow up.  Would like to possibly see someone else, as he did try to schedule, but they are booked out for the rest of the year.       Video Start Time: 1047         Review of Systems   Constitutional, HEENT, cardiovascular, pulmonary, gi and gu systems are negative, except as otherwise noted.      Objective           Vitals:  No vitals were obtained today due to virtual visit.    Physical Exam     GENERAL: Healthy, alert and no distress  EYES: Eyes grossly normal to inspection.  No discharge or erythema, or obvious scleral/conjunctival abnormalities.  RESP: No audible wheeze, cough, or visible cyanosis.  No visible retractions or increased work of breathing.    SKIN: Visible skin clear. No significant rash, abnormal pigmentation or lesions.  NEURO: Cranial nerves grossly intact.  Mentation and speech appropriate for age.  PSYCH: Mentation appears normal, affect normal/bright, judgement and insight intact, normal speech and appearance well-groomed.              Assessment & Plan     Type 2 diabetes mellitus with diabetic neuropathy, with long-term  current use of insulin (H)  Has follow-up with endo next month  - Hemoglobin A1c - FUTURE S+90; Future    Moderate episode of recurrent major depressive disorder (H)  Continue medications  - MENTAL HEALTH REFERRAL  - Adult; Outpatient Treatment; Individual/Couples/Family/Group Therapy/Health Psychology; [facility]: [facility] [phone]; We will contact you to schedule the appointment or please call with any questions    Poor memory    - NEUROPSYCHOLOGY REFERRAL    Tremor  Was previously seen by Dr Erazo  - NEUROLOGY ADULT REFERRAL       See Patient Instructions    Return in about 7 weeks (around 10/23/2020) for endocrinology.    Lisa Pierre PA-C  [facility]      Video-Visit Details    Type of service:  Video Visit    Video End Time:10:42 AM    Originating Location (pt. Location): Home    Distant Location (provider location):  [facility]     Platform used for Video Visit: Tessa

## 2020-09-02 ENCOUNTER — VIRTUAL VISIT (OUTPATIENT)
Dept: FAMILY MEDICINE | Facility: CLINIC | Age: 60
End: 2020-09-02
Payer: MEDICARE

## 2020-09-02 DIAGNOSIS — R45.4 EXCESSIVE ANGER: ICD-10-CM

## 2020-09-02 DIAGNOSIS — Z79.4 TYPE 2 DIABETES MELLITUS WITH DIABETIC NEUROPATHY, WITH LONG-TERM CURRENT USE OF INSULIN (H): Primary | ICD-10-CM

## 2020-09-02 DIAGNOSIS — R25.1 TREMOR: ICD-10-CM

## 2020-09-02 DIAGNOSIS — F33.1 MODERATE EPISODE OF RECURRENT MAJOR DEPRESSIVE DISORDER (H): ICD-10-CM

## 2020-09-02 DIAGNOSIS — R41.3 POOR MEMORY: ICD-10-CM

## 2020-09-02 DIAGNOSIS — E11.40 TYPE 2 DIABETES MELLITUS WITH DIABETIC NEUROPATHY, WITH LONG-TERM CURRENT USE OF INSULIN (H): Primary | ICD-10-CM

## 2020-09-02 PROCEDURE — 99214 OFFICE O/P EST MOD 30 MIN: CPT | Mod: 95 | Performed by: PHYSICIAN ASSISTANT

## 2020-09-02 RX ORDER — CITALOPRAM HYDROBROMIDE 40 MG/1
40 TABLET ORAL DAILY
Qty: 90 TABLET | Refills: 1 | Status: SHIPPED | OUTPATIENT
Start: 2020-09-02 | End: 2021-05-13

## 2020-09-02 RX ORDER — BUPROPION HYDROCHLORIDE 300 MG/1
300 TABLET ORAL EVERY MORNING
Qty: 90 TABLET | Refills: 1 | Status: SHIPPED | OUTPATIENT
Start: 2020-09-02 | End: 2021-05-13

## 2020-09-02 ASSESSMENT — ANXIETY QUESTIONNAIRES: GAD7 TOTAL SCORE: 11

## 2020-09-02 ASSESSMENT — PATIENT HEALTH QUESTIONNAIRE - PHQ9: SUM OF ALL RESPONSES TO PHQ QUESTIONS 1-9: 17

## 2020-09-16 ENCOUNTER — MYC MEDICAL ADVICE (OUTPATIENT)
Dept: ENDOCRINOLOGY | Facility: CLINIC | Age: 60
End: 2020-09-16

## 2020-09-16 DIAGNOSIS — E11.40 TYPE 2 DIABETES MELLITUS WITH DIABETIC NEUROPATHY, WITH LONG-TERM CURRENT USE OF INSULIN (H): Primary | ICD-10-CM

## 2020-09-16 DIAGNOSIS — R73.9 HYPERGLYCEMIA: ICD-10-CM

## 2020-09-16 DIAGNOSIS — E11.40 TYPE 2 DIABETES MELLITUS WITH DIABETIC NEUROPATHY, WITHOUT LONG-TERM CURRENT USE OF INSULIN (H): ICD-10-CM

## 2020-09-16 DIAGNOSIS — Z79.4 TYPE 2 DIABETES MELLITUS WITH DIABETIC NEUROPATHY, WITH LONG-TERM CURRENT USE OF INSULIN (H): Primary | ICD-10-CM

## 2020-09-16 NOTE — TELEPHONE ENCOUNTER
Routing refill request to provider for review/approval because:  Drug not active on patient's medication list  Labs not current:  A1C    Janina KHALIL RN, BSN

## 2020-09-17 NOTE — TELEPHONE ENCOUNTER
Spoke with patient.  Patient confirms that he is on Basaglar insulin and does need a refill on his Basaglar.  Patient confirms that he also needs a refill on his Glipizide XL 10 mg.  Patient is not on Levemir he checked his insulin while we talked on the phone and states his other pen is for Novolog.   Patient states he doesn't need a refill on Novolog at this time.  All prescription refills should now go to [facility] pharmacy in [place] off of [facility].  Rani Heck CMA on 9/17/2020 at 6:59 PM

## 2020-09-17 NOTE — TELEPHONE ENCOUNTER
Please clarify - I see a [email or web address] message from the patient yesterday requesting a refill of his glipizide XL 10 mg, two tabs q am but why was a refill request sent to me for Basaglar insulin.  Also, patient is on Levemir and I don't see any messages regarding an insurance formulary change.    Thanks.  Diana Butler NP  Endocrinology

## 2020-09-18 RX ORDER — INSULIN GLARGINE 100 [IU]/ML
INJECTION, SOLUTION SUBCUTANEOUS
Qty: 30 ML | Refills: 3 | Status: SHIPPED | OUTPATIENT
Start: 2020-09-18 | End: 2021-08-17

## 2020-09-18 RX ORDER — GLIPIZIDE 10 MG/1
20 TABLET, FILM COATED, EXTENDED RELEASE ORAL DAILY
Qty: 180 TABLET | Refills: 0 | Status: SHIPPED | OUTPATIENT
Start: 2020-09-18 | End: 2020-12-17

## 2020-09-18 NOTE — TELEPHONE ENCOUNTER
Spoke with patient 9/17/2020.  Informed patient Diana Butler would fill his prescriptions on 9/18/2020.  Refer to phone note dated 9/16/2020 for further documentation of specific medication refills and clarification of medications by patient.  Rani Heck CMA on 9/18/2020 at 3:47 PM

## 2020-09-25 ENCOUNTER — OFFICE VISIT (OUTPATIENT)
Dept: PODIATRY | Facility: CLINIC | Age: 60
End: 2020-09-25
Payer: MEDICARE

## 2020-09-25 VITALS
WEIGHT: 245 LBS | DIASTOLIC BLOOD PRESSURE: 70 MMHG | BODY MASS INDEX: 39.37 KG/M2 | HEIGHT: 66 IN | SYSTOLIC BLOOD PRESSURE: 132 MMHG

## 2020-09-25 DIAGNOSIS — L97.521 DIABETIC ULCER OF LEFT FOOT ASSOCIATED WITH TYPE 2 DIABETES MELLITUS, LIMITED TO BREAKDOWN OF SKIN, UNSPECIFIED PART OF FOOT (H): ICD-10-CM

## 2020-09-25 DIAGNOSIS — E11.42 DIABETIC POLYNEUROPATHY ASSOCIATED WITH TYPE 2 DIABETES MELLITUS (H): Primary | ICD-10-CM

## 2020-09-25 DIAGNOSIS — E11.621 DIABETIC ULCER OF LEFT FOOT ASSOCIATED WITH TYPE 2 DIABETES MELLITUS, LIMITED TO BREAKDOWN OF SKIN, UNSPECIFIED PART OF FOOT (H): ICD-10-CM

## 2020-09-25 PROCEDURE — 99213 OFFICE O/P EST LOW 20 MIN: CPT | Mod: 25 | Performed by: PODIATRIST

## 2020-09-25 PROCEDURE — 97597 DBRDMT OPN WND 1ST 20 CM/<: CPT | Performed by: PODIATRIST

## 2020-09-25 ASSESSMENT — MIFFLIN-ST. JEOR: SCORE: 1854.06

## 2020-09-25 NOTE — PATIENT INSTRUCTIONS
Thank you for choosing [facility] / [facility] Ankle [facility]!    [facility] [place] SCHEDULE SURGERY: [phone]   [address] BILLING QUESTIONS: [phone]   [place] [address] AFTER HOURS: [phone]   PH: [phone] CONSUMER [employer] LINE:[phone]   FAX: [phone] APPOINTMENTS: [phone]     [facility] LOC[facility]  [facility]  [address]  [place] [address]  Phone: [phone]  Fax: [phone] [facility]  [address]  [place] [address]  Phone: [phone]   Fax: [phone]   [facility]  [address]  [place] [address]  Phone: [phone]  Fax: [phone] [facility]  [address]  [place] [address]  Phone: [phone]   Fax: [phone]   [facility]   [address]  [place] [address]  Phone: [phone]  Fax: [phone] * Please call any location listed to make an appointment for a casting/fitting. Your referral was sent to their central office and they will all have the order on file.       CALLUS / CORN / IPK CARE  When there is excessive friction or pressure on the skin, the body responds by making the skin thicker to protect the deeper structures from becoming exposed. While this works well to protect the deeper structures, the thickened skin can cause increased pressure and pain.    Callus: flat, diffuse thickened areas are simple calluses and they are usually caused by friction. Often these are the result of rubbing on a shoe or from going barefoot.    Corns: calluses with a central core on or between the toes are called corns. These result from prominent joints on toes rubbing together. Theses are a symptom of bone malalignment or illfitting shoes and will always recur unless the underlying bones are addressed surgically.    IPK: calluses with a central core  on the ball of the foot are usually IPKs (intractable plantar keratosis). These are caused by excessive pressure from the metatarsals, the bones that make up the ball of the foot. Often one of these bones is too long or too prominent. Again, these will always recur unless the underlying bone issue is addressed. There is no cure for these. They will either go away by themselves, recur, or more could develop.    TREATMENT RECOMENDATIONS  - File: Trim them down with a pumice stone or callus file a couple times a week to remove callus tissue that builds up. An electric callus removing device. Amope Pedi Perfect Electronic Pedicure Foot File and Callus Remover can be a good option.   - Moisturize: Lotion can be applied to soften the callus. A lactic acid or urea based cream such as Carmol, Kersal or Vanicream or thicker cream with shea butter are good options.   - Foot Gear: Good supportive shoes and minimizing barefoot walking can slow down callus formation and can decrease pain levels. Gel inserts can also provide padding to the bottom of the foot to prevent pain and slow recurrence. Toe spacers, toe covers, can custom orthotic inserts can be beneficial as well.  - Surgery: If there is a surgical pathology noted, such as a prominent bone, often this needs to be addressed surgically to minimize recurrence. However, sometimes the lesion simply migrates to another spot after surgery, so it is not a guaranteed cure.       ROUTINE FOOT CARE (NAIL TRIMMING / CALLUSES)    Go to afcna.org ([employer]) and search for providers near you.  Otherwise, this is a list we have gathered of  recommended locations/providers in [place].    [facility]   [phone]   [facility]  [phone]  [email or web address]   [facility]  [phone]  [place] [address] [place] [facility]  [phone]  krishna.   Manuela Rouse, DPM  [address] [place] [address]  [phone] Thomas Lira, JANEEN  [address] [place] [address]   [phone]   [facility]  [phone] [address], [place] [address]  [facility]  Dr. Ezekiel Snowden  [phone]  [facility]  [phone]  [facility]  (does not [place]) FYI:  *Some providers accept insurance while others are out of pocket. Please contact them for details*     PRICE THERAPY  Many aches and pains throughout the foot and ankle can be helped with many simple treatments. This is usually described as PRICE Therapy.      P - Protection - often times, inflammation/pain in the lower extremity is not able to improve simply because the areas involved are never allowed to rest. Every step we take can bother the problematic area. Protecting those areas is an important step in the healing process. This may involve a walking cast boot, a special insert/orthotic device, an ankle brace, or simply avoiding barefoot walking.    R - Rest - in addition to protecting the foot/ankle, resting is an important, but often times difficult, treatment option. Getting off your feet when they bother you, and specifically avoiding activities that cause pain/discomfort, are very beneficial to prevent, and treat, foot/ankle pain.      I - Ice - icing regularly can help to decrease inflammation and swelling in the foot, thus decreasing pain. Using an ice pack or a bag of frozen veggies works very well. Ice for 20 minutes multiple times per day as needed.  Do not place the ice directly on the skin as this can cause tissue damage.    C - Compression - using a compression wrap or an ACE wrap can help to decrease swelling, which can help to decrease pain. Wearing the wraps is generally not needed at night, but they should be worn on a regular basis when you are going to be on your feet for prolonged periods as gravity tends to pull fluids down to your feet/ankles.    E - Elevation - elevating your lower  extremities multiple times daily for 15-20 minutes can help to decrease swelling, which works well in decreasing pain levels.    NSAID/Tylenol - Anti-inflammatories like Aleve or ibuprofen, and/or a pain medication, such as Tylenol, can help to improve pain levels and get the issue resolved sooner rather than later. Anyone with liver issues should be careful with Tylenol, and anyone with high blood pressure or heart, stomach or kidney issues should be careful with anti-inflammatories. Please ask if you have questions about these medications, including dosage.

## 2020-09-25 NOTE — PROGRESS NOTES
"Foot & Ankle Surgery   September 25, 2020    S:  Pt is seen today for evaluation of multiple issues. DMII, minimal sesatnion in feet, has fissures 1st MPJ bilateral R>>L.  Does not do anything for them as he has difficulty reaching his feet.  Has a sensation that he's slipping when going up stairs without shoes on.  Also mentions L ankle pain.  Injury in 1984, otherwise no specific inciting events..    Vitals:    09/25/20 0910   BP: 132/70   Weight: 111.1 kg (245 lb)   Height: 1.676 m (5' 6\")   '      ROS - Pos for CC.  Patient denies current nausea, vomiting, chills, fevers, belly pain, calf pain, chest pain or SOB.  Complete remainder of ROS it otherwise neg.      PE:  Gen:   No apparent distress  Eye:    Visual scanning without deficit  Ear:    Response to auditory stimuli wnl  Lung:    Non-labored breathing on RA noted  Abd:    NTND per patient report  Lymph:    Neg for pitting/non-pitting edema BLE  Vasc:    Pulses palpable, CFT minimally delayed  Neuro:    Light touch sensation nearly absent distally  Derm:    Callusing/fissure plantar-medial L 1st MPJ without underlying wound.  Large callus/fissure plantar-medial R 1st MPJ with partial-thickness wound without deep probing or SOI.  MSK:   Left lower extremity - tender at medial recess, minimal anterior gutter pain, no other rearfoot/midfoot pain  Calf:    Neg for redness, swelling or tenderness    Assessment:  60 year old male with DMII with neuropathy and right 1st MPJ fissure without SOI; left ankle pain with injury 1984      Plan:  Discussed etiologies, anatomy and options  1.  DMII with neuropathy and R 1st MPJ fissure without exposed fat or SOI  -Excisional debridement was performed, partial-thickness(limited to skin breakdown, no exposed subcutaneous fat), sharply debriding the wound, excising nonviable tissue to the above dimensions with a tissue nipper.  Hyperkeratotic tissue removed from both 1st MPJ sites  -wound care - wash/dry, abx ointment/bandaid " R 1st MPJ fissure until healed  -no indication for PO abx course  -advised DMII orthotics.  He will contact his insurance and will call prn for order    2.  Left ankle pain in setting of 1984 injury  -RICE/NSAID vs tylenol  -discussed dx/tx steroid injection.  declined today, will start with home therapies.  -consider injection, imaging should this fail to sufficiently improve pain      Follow up:  prn or sooner with acute issues      Body mass index is 39.54 kg/m .  Weight management plan: Patient was referred to their PCP to discuss a diet and exercise plan.         Eleazar Zafar DPM FACFAS FACFAOM  Podiatric Foot & Ankle Surgeon  [facility]  [phone]

## 2020-10-04 DIAGNOSIS — B37.2 YEAST INFECTION OF THE SKIN: ICD-10-CM

## 2020-10-04 DIAGNOSIS — E11.40 TYPE 2 DIABETES MELLITUS WITH DIABETIC NEUROPATHY, WITHOUT LONG-TERM CURRENT USE OF INSULIN (H): ICD-10-CM

## 2020-10-04 DIAGNOSIS — R73.9 HYPERGLYCEMIA: ICD-10-CM

## 2020-10-05 NOTE — TELEPHONE ENCOUNTER
"Routing refill request to provider for review/approval because:  Drug not active on patient's medication list  Patient needs to be seen because:  Visit due around 9/30/20, no appointment scheduled  Confirm metformin rx, see last note \"pt cannot tolerate higher dose\"  Lucía Bond RN, BSN  Message handled by CLINIC NURSE.          "

## 2020-10-06 RX ORDER — NYSTATIN 100000 U/G
CREAM TOPICAL
Qty: 45 G | Refills: 0 | Status: SHIPPED | OUTPATIENT
Start: 2020-10-06 | End: 2021-02-03

## 2020-10-14 ENCOUNTER — FCC EXTENDED DOCUMENTATION (OUTPATIENT)
Dept: BEHAVIORAL HEALTH | Facility: CLINIC | Age: 60
End: 2020-10-14

## 2020-10-14 ENCOUNTER — VIRTUAL VISIT (OUTPATIENT)
Dept: BEHAVIORAL HEALTH | Facility: CLINIC | Age: 60
End: 2020-10-14
Attending: PHYSICIAN ASSISTANT
Payer: MEDICARE

## 2020-10-14 DIAGNOSIS — F33.1 MODERATE EPISODE OF RECURRENT MAJOR DEPRESSIVE DISORDER (H): Primary | ICD-10-CM

## 2020-10-14 PROCEDURE — 90834 PSYTX W PT 45 MINUTES: CPT | Mod: 95 | Performed by: SOCIAL WORKER

## 2020-10-14 ASSESSMENT — COLUMBIA-SUICIDE SEVERITY RATING SCALE - C-SSRS
1. IN THE PAST MONTH, HAVE YOU WISHED YOU WERE DEAD OR WISHED YOU COULD GO TO SLEEP AND NOT WAKE UP?: NO
1. IN THE PAST MONTH, HAVE YOU WISHED YOU WERE DEAD OR WISHED YOU COULD GO TO SLEEP AND NOT WAKE UP?: NO
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
## 2020-10-22 ENCOUNTER — VIRTUAL VISIT (OUTPATIENT)
Dept: BEHAVIORAL HEALTH | Facility: CLINIC | Age: 60
End: 2020-10-22
Payer: MEDICARE

## 2020-10-22 DIAGNOSIS — F33.1 MODERATE EPISODE OF RECURRENT MAJOR DEPRESSIVE DISORDER (H): Primary | ICD-10-CM

## 2020-10-22 PROCEDURE — 90791 PSYCH DIAGNOSTIC EVALUATION: CPT | Mod: 95 | Performed by: SOCIAL WORKER
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
## 2020-10-28 ENCOUNTER — VIRTUAL VISIT (OUTPATIENT)
Dept: BEHAVIORAL HEALTH | Facility: CLINIC | Age: 60
End: 2020-10-28
Payer: MEDICARE

## 2020-10-28 DIAGNOSIS — F33.0 MILD EPISODE OF RECURRENT MAJOR DEPRESSIVE DISORDER (H): Primary | ICD-10-CM

## 2020-10-28 PROCEDURE — 90834 PSYTX W PT 45 MINUTES: CPT | Performed by: SOCIAL WORKER

## 2020-10-28 ASSESSMENT — PATIENT HEALTH QUESTIONNAIRE - PHQ9: SUM OF ALL RESPONSES TO PHQ QUESTIONS 1-9: 12

## 2020-10-28 NOTE — Clinical Note
Lisa Hickman,    Your patient presented to [facility] for a diagnostic evaluation today.  They will be beginning individual therapy with me.     Please do not hesitate to contact me if you have any questions in regards to Steve Cathy's care.        Veronica Chavis, SAURABH  October 28, 2020  [facility]

## 2020-10-28 NOTE — PROGRESS NOTES
Progress Note    Patient Name: Steve Morgan  Date: 10/28/2020         Service Type: Individual      Session Start Time: 10:30  Session End Time: 11:22     Session Length: 52    Session #: 2    Attendees: Client attended alone    Service Modality:  Video Visit:    Telemedicine Visit: The patient's condition can be safely assessed and treated via synchronous audio and visual telemedicine encounter.      Reason for Telemedicine Visit: Services only offered telehealth    Originating Site (Patient Location): Patient's home    Distant Site (Provider Location): Provider Remote Setting    Consent:  The patient/guardian has verbally consented to: the potential risks and benefits of telemedicine (video visit) versus in person care; bill my insurance or make self-payment for services provided; and responsibility for payment of non-covered services.     Patient would like the video invitation sent by: Send to e-mail at: [email or web address]    Mode of Communication:  Video Conference via [employer]    As the provider I attest to compliance with applicable laws and regulations related to telemedicine.     Treatment Plan Last Reviewed: 10/28/2020  PHQ-9 / JOSE MANUEL-7 : 12 - 10/28/2020    DATA  Interactive Complexity: No  Crisis: No       Progress Since Last Session (Related to Symptoms / Goals / Homework):   Symptoms: Improving patient not feeling sad or down.  Reports continuing to experience low energy    Homework: 1st session      Episode of Care Goals: Achieved / completed to satisfaction - PREPARATION (Decided to change - considering how); Intervened by negotiating a change plan and determining options / strategies for behavior change, identifying triggers, exploring social supports, and working towards setting a date to begin behavior change     Current / Ongoing Stressors and Concerns:   Patient reports stressors of getting angry while driving and that this has caused him and his wife to  experience a frightening situation with another .  Patient also has concerns about how his frustration causes problems in his relationship with his wife.  He also notes another concern that causes stress in this area of his life is his procrastination.     Treatment Objective(s) Addressed in This Session:   Increase interest, engagement, and pleasure in doing things  Patient will identify goals for treatment     Intervention:   CBT: Patient identified goals for treatment.  The concept of CBT was discussed and agreements were made for therapy targets.      Patient gave verbal ok for [facility] attendance agreement    ASSESSMENT: Current Emotional / Mental Status (status of significant symptoms):   Risk status (Self / Other harm or suicidal ideation)   Patient denies current fears or concerns for personal safety.   Patient denies current or recent suicidal ideation or behaviors.   Patient denies current or recent homicidal ideation or behaviors.   Patient denies current or recent self injurious behavior or ideation.   Patient denies other safety concerns.   Patient reports there has been no change in risk factors since their last session.     Patient reports there has been no change in protective factors since their last session.     Recommended that patient call 911 or go to the local ED should there be a change in any of these risk factors.     Appearance:   Appropriate    Eye Contact:   Good    Psychomotor Behavior: Retarded (Slowed)    Attitude:   Cooperative  Interested Friendly   Orientation:   All   Speech    Rate / Production: Slow     Volume:  Normal - patient reports he speaks loudly   Mood:    Euthymic   Affect:    Subdued    Thought Content:  Clear  Rumination    Thought Form:  Coherent  Logical  Circumstantial   Insight:    Good  and Fair      Medication Review:   No changes to current psychiatric medication(s)     Medication Compliance:   Yes patient has been better about taking medications     Changes in  Health Issues:   None reported     Chemical Use Review:   Substance Use: Chemical use reviewed, no active concerns identified      Tobacco Use: No current tobacco use.      Diagnosis:  1. Mild episode of recurrent major depressive disorder (H)        Collateral Reports Completed:   Routed note to PCP    PLAN: (Patient Tasks / Therapist Tasks / Other)  Patient will review treatment plan in MyChart  Writer will route note to PCP to note engagement in therapy        Veronica Chavis, ISAUROSW                                                         ______________________________________________________________________    Treatment Plan    Patient's Name: Steve Morgan  YOB: 1960    Date: 10/28/2020    DSM5 Diagnoses: 296.32 (F33.1) Major Depressive Disorder, Recurrent Episode, Moderate With melancholic features  Psychosocial / Contextual Factors: Individual Factors Patient reports that he has family friend's but otherwise is socially isolated, Community Factors Patient reports that he was volunteering, but has been unable to due to finanical reasons and Health- Seeking Factors Patient reports that neuropathy from diabetes has made it so that he cannot work and creates other difficulties for him.    WHODAS: 20    Referral / Collaboration:  Referral to another professional/service is not indicated at this time..    Anticipated number of session or this episode of care: 30-40      MeasurableTreatment Goal(s) related to diagnosis / functional impairment(s)  Goal 1: Patient will manage my anger when I am driving    I will know I've met my goal when I am a calmer drive and would be able to let go of a situation that I don't agree with.      Objective #A (Patient Action)    Patient will use progressive relaxation exercise once in the morning and once in the evening to help relieve tension  practice deep diaphragm breathing once daily for at least 5 minutes to reduce anger / irritability and regain a calmer,  more clear state of mind.  Status: New - Date: 10/28/2020     Intervention(s)  Therapist will assign homework to engage in progressive musle relaxation or breathing depending on patient preference  teach teach breathing or progressive muscle relaxation.    Objective #B  Patient will identify patterns of escalation  (i.e. tightness in chest, flushed face, increased heart rate, clenched hands, etc.).  Status: New - Date: 10/28/2020     Intervention(s)  Therapist will assign homework to engage in using skills to help with reducing anger  role-play using skills in the moment  teach cognitive distortions.    Objective #C  Patient will practice one mindfulness skill each day for 5 minutes.  Status: New - Date: 10/28/2020     Intervention(s)  Therapist will assign homework practice mindfulness while driving  teach mindfulness.      Goal 2: Patient will be able to have better communication with his wife    I will know I've met my goal when I am not raising my voice when I am frustrated.      Objective #A (Patient Action)    Status: New - Date: 10/28/2020     Patient will learn & utilize at least 1 assertive communication skills weekly.    Intervention(s)  Therapist will assign homework to engage in assertive communication skills  role-play assertiveness skills.    Objective #B  Patient will Identify negative self-talk and behaviors: challenge core beliefs, myths, and actions.    Status: New - Date: 10/28/2020     Intervention(s)  Therapist will assign homework to engage in cognitive restructuring  teach emotional recognition/identification. and thoughts connected to these emotions.    Objective #C  Patient will identify at least 3 alternative response(s) to aggressive behaviors.  Status: New - Date: 10/28/2020     Intervention(s)  Therapist will assign homework to engage in skills that can reduce vulnerability  provide support to engage in coping skills when feeling frustrated.      Goal 3: Patient will not procrastinate     I will know I've met my goal when I am up and doing things and when I see something that needs to be done I do it (cleaning litter boxes and taking out the garbage).      Objective #A (Patient Action)    Status: New - Date: 10/28/2020     Patient will identify 3-5 fears / thoughts that contribute to feeling anxious.    Intervention(s)  Therapist will assign homework to pay attention to thoughts and feelings when procrastinating  provide education on the connection of thoughts and feelings related to procrastination.    Objective #B  Patient will Increase interest, engagement, and pleasure in doing things.    Status: New - Date: 10/28/2020     Intervention(s)  Therapist will assign homework to engage in a daily schedule  teach how to improve motivation.    Objective #C  Patient will Feel less tired and more energy during the day .  Status: New - Date: 10/28/2020     Intervention(s)  Therapist will assign homework to engage in activity to increase motivation by gaging amount of energy activities take  teach improving motivation skills by gaging energy level of activities.      Patient has not reviewed nor agreed to the above plan.      Veronica Chavis, [facility]  October 28, 2020

## 2020-11-04 ENCOUNTER — ALLIED HEALTH/NURSE VISIT (OUTPATIENT)
Dept: FAMILY MEDICINE | Facility: CLINIC | Age: 60
End: 2020-11-04
Payer: MEDICARE

## 2020-11-04 DIAGNOSIS — Z23 NEED FOR PROPHYLACTIC VACCINATION AND INOCULATION AGAINST INFLUENZA: Primary | ICD-10-CM

## 2020-11-04 PROCEDURE — 90682 RIV4 VACC RECOMBINANT DNA IM: CPT

## 2020-11-04 PROCEDURE — G0008 ADMIN INFLUENZA VIRUS VAC: HCPCS

## 2020-11-04 PROCEDURE — 99207 PR NO CHARGE NURSE ONLY: CPT

## 2020-11-05 ENCOUNTER — VIRTUAL VISIT (OUTPATIENT)
Dept: BEHAVIORAL HEALTH | Facility: CLINIC | Age: 60
End: 2020-11-05
Payer: MEDICARE

## 2020-11-05 DIAGNOSIS — F33.0 MILD EPISODE OF RECURRENT MAJOR DEPRESSIVE DISORDER (H): Primary | ICD-10-CM

## 2020-11-05 PROCEDURE — 90834 PSYTX W PT 45 MINUTES: CPT | Mod: 95 | Performed by: SOCIAL WORKER

## 2020-11-05 NOTE — PROGRESS NOTES
Progress Note    Patient Name: Steve Morgan  Date: 11/5/2020         Service Type: Individual      Session Start Time: 10:00  Session End Time: 10:52     Session Length: 52    Session #: 3    Attendees: Client attended alone    Service Modality:  Video Visit:    Telemedicine Visit: The patient's condition can be safely assessed and treated via synchronous audio and visual telemedicine encounter.      Reason for Telemedicine Visit: Services only offered telehealth    Originating Site (Patient Location): Patient's home    Distant Site (Provider Location): Provider Remote Setting    Consent:  The patient/guardian has verbally consented to: the potential risks and benefits of telemedicine (video visit) versus in person care; bill my insurance or make self-payment for services provided; and responsibility for payment of non-covered services.     Patient would like the video invitation sent by: Send to e-mail at: [email or web address]    Mode of Communication:  Video Conference via Amwell    As the provider I attest to compliance with applicable laws and regulations related to telemedicine.     Treatment Plan Last Reviewed: 10/28/2020  PHQ-9 / JOSE MANUEL-7 : 12 - 10/28/2020    DATA  Interactive Complexity: No  Crisis: No       Progress Since Last Session (Related to Symptoms / Goals / Homework):   Symptoms: No change Continued to feel more happy throughout week    Homework: Completed in session      Episode of Care Goals: Achieved / completed to satisfaction - PREPARATION (Decided to change - considering how); Intervened by negotiating a change plan and determining options / strategies for behavior change, identifying triggers, exploring social supports, and working towards setting a date to begin behavior change     Current / Ongoing Stressors and Concerns:   Patient reports stressors of getting angry while driving and that this has caused him and his wife to experience a frightening  situation with another .  Patient reports that he becomes mad with the people that he sees most in his life, his parents, his son, his wife and his cat.  He also notes another concern that causes stress in this area of his life is his procrastination and that he spends most of his day on the couch.       Treatment Objective(s) Addressed in This Session:   Identify triggers to anger, people, situations and when feelings of being wronged occur     Intervention:   CBT: Had patient identify triggers for anger, people, situations and times.  Patient reports much of his anger is experienced towards the people he sees the most and identifies this as a problem in his relationships.  Discussed how when we get angry other emotions can be involved as well and gave patient feeling chart.  Encouraged patient to note his emotions and physiological responses when he becomes upset over the next week.       ASSESSMENT: Current Emotional / Mental Status (status of significant symptoms):   Risk status (Self / Other harm or suicidal ideation)   Patient denies current fears or concerns for personal safety.   Patient denies current or recent suicidal ideation or behaviors.   Patient denies current or recent homicidal ideation or behaviors.   Patient denies current or recent self injurious behavior or ideation.   Patient denies other safety concerns.   Patient reports there has been no change in risk factors since their last session.     Patient reports there has been no change in protective factors since their last session.     Recommended that patient call 911 or go to the local ED should there be a change in any of these risk factors.     Appearance:   Appropriate    Eye Contact:   Good    Psychomotor Behavior: Retarded (Slowed)    Attitude:   Cooperative  Interested Friendly   Orientation:   All   Speech    Rate / Production: Slow     Volume:  Normal - patient reports he speaks loudly   Mood:    Euthymic   Affect:    Subdued     Thought Content:  Clear  Rumination    Thought Form:  Coherent  Logical  Circumstantial   Insight:    Good  and Fair      Medication Review:   No changes to current psychiatric medication(s)     Medication Compliance:   Yes     Changes in Health Issues:   None reported     Chemical Use Review:   Substance Use: Chemical use reviewed, no active concerns identified      Tobacco Use: No current tobacco use.      Diagnosis:  1. Mild episode of recurrent major depressive disorder (H)        Collateral Reports Completed:   Not Applicable    PLAN: (Patient Tasks / Therapist Tasks / Other)  [facility]  Patient will note feelings and sensations that he experiences when angry        Veronica Chavis, LICSW                                                         ______________________________________________________________________    Treatment Plan    Patient's Name: Steve Morgan  YOB: 1960    Date: 10/28/2020    DSM5 Diagnoses: 296.32 (F33.1) Major Depressive Disorder, Recurrent Episode, Moderate With melancholic features  Psychosocial / Contextual Factors: Individual Factors Patient reports that he has family friend's but otherwise is socially isolated, Community Factors Patient reports that he was volunteering, but has been unable to due to finanical reasons and Health- Seeking Factors Patient reports that neuropathy from diabetes has made it so that he cannot work and creates other difficulties for him.    WHODAS: 20    Referral / Collaboration:  Referral to another professional/service is not indicated at this time..    Anticipated number of session or this episode of care: 30-40      MeasurableTreatment Goal(s) related to diagnosis / functional impairment(s)  Goal 1: Patient will manage my anger when I am driving    I will know I've met my goal when I am a calmer drive and would be able to let go of a situation that I don't agree with.      Objective #A (Patient  Action)    Patient will use progressive relaxation exercise once in the morning and once in the evening to help relieve tension  practice deep diaphragm breathing once daily for at least 5 minutes to reduce anger / irritability and regain a calmer, more clear state of mind.  Status: New - Date: 10/28/2020     Intervention(s)  Therapist will assign homework to engage in progressive musle relaxation or breathing depending on patient preference  teach teach breathing or progressive muscle relaxation.    Objective #B  Patient will identify patterns of escalation  (i.e. tightness in chest, flushed face, increased heart rate, clenched hands, etc.).  Status: New - Date: 10/28/2020     Intervention(s)  Therapist will assign homework to engage in using skills to help with reducing anger  role-play using skills in the moment  teach cognitive distortions.    Objective #C  Patient will practice one mindfulness skill each day for 5 minutes.  Status: New - Date: 10/28/2020     Intervention(s)  Therapist will assign homework practice mindfulness while driving  teach mindfulness.      Goal 2: Patient will be able to have better communication with his wife    I will know I've met my goal when I am not raising my voice when I am frustrated.      Objective #A (Patient Action)    Status: New - Date: 10/28/2020     Patient will learn & utilize at least 1 assertive communication skills weekly.    Intervention(s)  Therapist will assign homework to engage in assertive communication skills  role-play assertiveness skills.    Objective #B  Patient will Identify negative self-talk and behaviors: challenge core beliefs, myths, and actions.    Status: New - Date: 10/28/2020     Intervention(s)  Therapist will assign homework to engage in cognitive restructuring  teach emotional recognition/identification. and thoughts connected to these emotions.    Objective #C  Patient will identify at least 3 alternative response(s) to aggressive  behaviors.  Status: New - Date: 10/28/2020     Intervention(s)  Therapist will assign homework to engage in skills that can reduce vulnerability  provide support to engage in coping skills when feeling frustrated.      Goal 3: Patient will not procrastinate    I will know I've met my goal when I am up and doing things and when I see something that needs to be done I do it (cleaning litter boxes and taking out the garbage).      Objective #A (Patient Action)    Status: New - Date: 10/28/2020     Patient will identify 3-5 fears / thoughts that contribute to feeling anxious.    Intervention(s)  Therapist will assign homework to pay attention to thoughts and feelings when procrastinating  provide education on the connection of thoughts and feelings related to procrastination.    Objective #B  Patient will Increase interest, engagement, and pleasure in doing things.    Status: New - Date: 10/28/2020     Intervention(s)  Therapist will assign homework to engage in a daily schedule  teach how to improve motivation.    Objective #C  Patient will Feel less tired and more energy during the day .  Status: New - Date: 10/28/2020     Intervention(s)  Therapist will assign homework to engage in activity to increase motivation by gaging amount of energy activities take  teach improving motivation skills by gaging energy level of activities.      Patient has not reviewed nor agreed to the above plan.      Veronica Chavis, [facility]  October 28, 2020

## 2020-11-17 ENCOUNTER — VIRTUAL VISIT (OUTPATIENT)
Dept: SLEEP MEDICINE | Facility: CLINIC | Age: 60
End: 2020-11-17
Payer: MEDICARE

## 2020-11-17 DIAGNOSIS — G47.33 OSA (OBSTRUCTIVE SLEEP APNEA): ICD-10-CM

## 2020-11-17 DIAGNOSIS — E66.9 OBESITY (BMI 30-39.9): ICD-10-CM

## 2020-11-17 DIAGNOSIS — G47.8 UPPER AIRWAY RESISTANCE SYNDROME: Primary | ICD-10-CM

## 2020-11-17 PROCEDURE — 99213 OFFICE O/P EST LOW 20 MIN: CPT | Mod: 95 | Performed by: PHYSICIAN ASSISTANT

## 2020-11-17 NOTE — PATIENT INSTRUCTIONS

## 2020-11-17 NOTE — PROGRESS NOTES
"Steve Morgan is a 60 year old male who is being evaluated via a billable video visit.      The patient has been notified of following:     \"This video visit will be conducted via a call between you and your physician/provider. We have found that certain health care needs can be provided without the need for an in-person physical exam.  This service lets us provide the care you need with a video conversation.  If a prescription is necessary we can send it directly to your pharmacy.  If lab work is needed we can place an order for that and you can then stop by our lab to have the test done at a later time.    Video visits are billed at different rates depending on your insurance coverage.  Please reach out to your insurance provider with any questions.    If during the course of the call the physician/provider feels a video visit is not appropriate, you will not be charged for this service.\"    Patient has given verbal consent for Video visit? Yes  How would you like to obtain your AVS? MyChart  If you are dropped from the video visit, the video invite should be resent to: Send to e-mail at: [email or web address]  Will anyone else be joining your video visit? No        Video-Visit Details    Type of service:  Video Visit    Video Start Time: 10:59AM  Video End Time: 11:23AM    Originating Location (pt. Location): Home    Distant Location (provider location):  [facility]     Platform used for Video Visit: Tessa Argueta PA-C    [facility] [place]   Outpatient Sleep Medicine  November 17, 2020      Name: Steve Morgan MRN# [id number]   Age: 60 year old YOB: 1960        Chief Complaint      Chief Complaint   Patient presents with     CPAP Follow Up            History of Present Illness:     Steve Morgan is a 60 year old male who presents to the clinic for follow-up of their severe upper airway resistance syndrome/history of obstructive sleep " apnea. Other significant past medical history significant for HTN, HLD, DM with peripheral neuropathy, hypothyroidism, PAD, BPH, morbid obesity, and depression.    Originally diagnosed with DAMIÁN 10/3/2002 through [facility] ([id number]#) with split night study revealing AHI 31/hr, RDI 46/hr, oxygen johnnie 83%; CPAP 8cm identified as optimal setting and started CPAP at that time. More recently, he had a split night PSG completed on 1/18/2010 through [facility] revealing predominantly respiratory effort related arousals and severe upper airway resistance syndrome with an AHI 1.6 and RDI 33.8; CPAP 8cmH2O again adequately treated sleep disordered breathing events. He was treated with CPAP 8cmH2O for a number of years until switched to auto- titrating PAP [id number] in 12/2014 due to residular snoring and weight gain (pt was 104kg at time of study, 107kg in 12/2014).     Prescription written for new CPAP machine at last visit given that old machine was malfunctioning and humidifier function no longer working. Set up with new machine 8/14/2020 through [facility].     Has been doing well since his last visit. No difficulties with new machine. Patient is using a nasal mask. Some difficulty initially with acclimating to new mask style but now tolerating well. Does occasionally have issues with mask leak as of recently, suspects time to switch out mask cushion since has been 3 months. No known snoring or gasp arousals with the mask on; sleeps alone. No longer having significant oral/nasal dryness or epistaxis. They are not having pain/irritation/skin breakdown. The pressure settings are comfortable.     ResMed Auto-PAP 7.0 - 15.0 cmH2O 30 day usage data:    93% of days with > 4 hours of use. 0/30 days with no use.   Average use 593 minutes per day.   95%ile Leak 18.74 L/min.   CPAP 95% pressure 12.4 cm.   AHI 1.58 events per hour.     Bedtime is typically 11-11:30PM. Usually it takes about 15-45 minutes to fall asleep with the mask on.  "Wake time is typically 8:00AM. Patient is using PAP therapy 9.5-10 hours per night per download. The patient is usually getting ~9 hours of sleep per night. Reports recent nighttime awaking ~2AM to use the bathroom and will have difficulty returning to sleep. This just started this week.     Patient does feel rested in the morning, wakes up more refreshed, is sleeping better with less arousals, and has improved energy level during the day with CPAP use. He had a total Wellington Sleepiness Scale of 4 today which is normal,  scores of 10 or higher indicating abnormal levels of sleepiness.    Past medical/surgical history, family history, social history, medications and allergies were reviewed.         Physical Examination:   There were no vitals taken for this visit.   Vitals - Patient Reported 11/17/2020   Height (Patient Reported) 5' 6\"   Weight (Patient Reported) 242 lb   BMI (Based on Pt Reported Ht/Wt) 39.06 kg/m2   General appearance: Awake, alert, cooperative. Well groomed. Sitting comfortably in chair. In no apparent distress.  HEENT: Head: Normocephalic, atraumatic. Eyes:Conjunctiva clear. Sclera normal. Nose: External appearance without deformity.   Pulmonary:  Able to speak easily in full sentences. No cough or wheeze.   Skin:  No rashes or significant lesions on visible skin.   Neurologic: Alert, oriented x3.   Psychiatric: Mood euthymic. Affect congruent with full range and intensity.         Assessment and Plan:   1. Upper airway resistance syndrome  2. DAMIÁN (obstructive sleep apnea)  3. Obesity (BMI 30-39.9)    Patient's sleep disordered breathing is adequately managed and well controlled with current PAP settings [id number] with low residual AHI of 1.58 events per hour. Compliance is excellent. Continues to tolerate PAP well and perceives continued benefit from use of PAP. No indication to change pressure settings today. Will renew mask/supplies order for next year.   - Comprehensive DME    We also spent " some time today discussing sleep hygiene/CBTI concepts given recent nighttime awakening with difficulty returning to sleep. Suspect this is related to stress/anxiety levels given global stress of pandemic/recent election/etc. He was recommended to keep a consistent sleep schedule, avoid looking at the clock in middle of night, minimize electronics use in the last hours before bed, avoid sleep-waiting (laying in bed hoping for sleep) and employ paradoxical intention if needed.     Assuming he continues to do as well as he is currently, Steve MONY Morgan will follow up in about 1 year, or sooner as needed.     CC:  Lisa Pierre PA-C  Nov 17, 2020     [facility] [place]  3rd Floor  [address] , [place], [place] [id number] [place] [address] [facility] [place]  [address] [address]    Chart documentation was completed, in part, with [employer] voice-recognition software. Even though reviewed, some grammatical, spelling, and word errors may remain.

## 2020-11-19 ENCOUNTER — VIRTUAL VISIT (OUTPATIENT)
Dept: BEHAVIORAL HEALTH | Facility: CLINIC | Age: 60
End: 2020-11-19
Payer: MEDICARE

## 2020-11-19 DIAGNOSIS — F33.0 MILD EPISODE OF RECURRENT MAJOR DEPRESSIVE DISORDER (H): Primary | ICD-10-CM

## 2020-11-19 PROCEDURE — 90834 PSYTX W PT 45 MINUTES: CPT | Mod: 95 | Performed by: SOCIAL WORKER

## 2020-11-19 ASSESSMENT — PATIENT HEALTH QUESTIONNAIRE - PHQ9: SUM OF ALL RESPONSES TO PHQ QUESTIONS 1-9: 12

## 2020-11-19 NOTE — PROGRESS NOTES
Progress Note    Patient Name: Steve Morgan  Date: 11/19/2020         Service Type: Individual      Session Start Time: 11:15  Session End Time: 11:54     Session Length: 39    Session #: 4    Attendees: Client attended alone    Service Modality:  Video Visit:    Telemedicine Visit: The patient's condition can be safely assessed and treated via synchronous audio and visual telemedicine encounter.      Reason for Telemedicine Visit: Services only offered telehealth    Originating Site (Patient Location): Patient's home    Distant Site (Provider Location): Provider Remote Setting    Consent:  The patient/guardian has verbally consented to: the potential risks and benefits of telemedicine (video visit) versus in person care; bill my insurance or make self-payment for services provided; and responsibility for payment of non-covered services.     Patient would like the video invitation sent by: Send to e-mail at: [email or web address]    Mode of Communication:  Video Conference via [employer]    As the provider I attest to compliance with applicable laws and regulations related to telemedicine.     Treatment Plan Last Reviewed: 10/28/2020  PHQ-9 / JOSE MANUEL-7 : 12 - 11/19/2020    DATA  Interactive Complexity: No  Crisis: No       Progress Since Last Session (Related to Symptoms / Goals / Homework):   Symptoms: No change Continued to feel more happy throughout week    Homework: Achieved / completed to satisfaction      Episode of Care Goals: Achieved / completed to satisfaction - PREPARATION (Decided to change - considering how); Intervened by negotiating a change plan and determining options / strategies for behavior change, identifying triggers, exploring social supports, and working towards setting a date to begin behavior change     Current / Ongoing Stressors and Concerns:   Patient reports stressors of getting angry while driving and that this has caused him and his wife to  experience a frightening situation with another .  Patient reports that he becomes mad with the people that he sees most in his life, his parents, his son, his wife and his cat.  He also notes another concern that causes stress in this area of his life is his procrastination and that he spends most of his day on the couch.       Treatment Objective(s) Addressed in This Session:   identify patterns of escalation  (i.e. tightness in chest, flushed face, increased heart rate, clenched hands, etc.)  Identify negative self-talk and behaviors: challenge core beliefs, myths, and actions     Intervention:   CBT: Patient was able to name emotions related to anger of guilt and annoyance.  He was also able to name what it feels like in his body when he becomes upset.  Patient discussed guilt that he has experienced since he was a child.  Writer explored the connection of his thoughts and emotions to this experience with patient.  Patient was informed of cognitive distortions and how they lead us to feeling bad about ourselves,       ASSESSMENT: Current Emotional / Mental Status (status of significant symptoms):   Risk status (Self / Other harm or suicidal ideation)   Patient denies current fears or concerns for personal safety.   Patient denies current or recent suicidal ideation or behaviors.   Patient denies current or recent homicidal ideation or behaviors.   Patient denies current or recent self injurious behavior or ideation.   Patient denies other safety concerns.   Patient reports there has been no change in risk factors since their last session.     Patient reports there has been no change in protective factors since their last session.     Recommended that patient call 911 or go to the local ED should there be a change in any of these risk factors.     Appearance:   Appropriate    Eye Contact:   Good    Psychomotor Behavior: Retarded (Slowed)    Attitude:   Cooperative  Interested  Friendly   Orientation:   All   Speech    Rate / Production: Slow     Volume:  Normal - patient reports he speaks loudly   Mood:    Euthymic   Affect:    Subdued    Thought Content:  Clear  Rumination    Thought Form:  Coherent  Logical  Circumstantial   Insight:    Good  and Fair      Medication Review:   No changes to current psychiatric medication(s)     Medication Compliance:   Yes     Changes in Health Issues:   None reported     Chemical Use Review:   Substance Use: Chemical use reviewed, no active concerns identified      Tobacco Use: No current tobacco use.      Diagnosis:  1. Mild episode of recurrent major depressive disorder (H)        Collateral Reports Completed:   Not Applicable    PLAN: (Patient Tasks / Therapist Tasks / Other)  Patient will review cognitive distortions         Veronica Chavis, LICSW                                                         ______________________________________________________________________    Treatment Plan    Patient's Name: Steve Morgan  YOB: 1960    Date: 10/28/2020    DSM5 Diagnoses: 296.32 (F33.1) Major Depressive Disorder, Recurrent Episode, Moderate With melancholic features  Psychosocial / Contextual Factors: Individual Factors Patient reports that he has family friend's but otherwise is socially isolated, Community Factors Patient reports that he was volunteering, but has been unable to due to finanical reasons and Health- Seeking Factors Patient reports that neuropathy from diabetes has made it so that he cannot work and creates other difficulties for him.    WHODAS: 20    Referral / Collaboration:  Referral to another professional/service is not indicated at this time..    Anticipated number of session or this episode of care: 30-40      MeasurableTreatment Goal(s) related to diagnosis / functional impairment(s)  Goal 1: Patient will manage my anger when I am driving    I will know I've met my goal when I am a calmer drive and would  be able to let go of a situation that I don't agree with.      Objective #A (Patient Action)    Patient will use progressive relaxation exercise once in the morning and once in the evening to help relieve tension  practice deep diaphragm breathing once daily for at least 5 minutes to reduce anger / irritability and regain a calmer, more clear state of mind.  Status: New - Date: 10/28/2020     Intervention(s)  Therapist will assign homework to engage in progressive musle relaxation or breathing depending on patient preference  teach teach breathing or progressive muscle relaxation.    Objective #B  Patient will identify patterns of escalation  (i.e. tightness in chest, flushed face, increased heart rate, clenched hands, etc.).  Status: New - Date: 10/28/2020     Intervention(s)  Therapist will assign homework to engage in using skills to help with reducing anger  role-play using skills in the moment  teach cognitive distortions.    Objective #C  Patient will practice one mindfulness skill each day for 5 minutes.  Status: New - Date: 10/28/2020     Intervention(s)  Therapist will assign homework practice mindfulness while driving  teach mindfulness.      Goal 2: Patient will be able to have better communication with his wife    I will know I've met my goal when I am not raising my voice when I am frustrated.      Objective #A (Patient Action)    Status: New - Date: 10/28/2020     Patient will learn & utilize at least 1 assertive communication skills weekly.    Intervention(s)  Therapist will assign homework to engage in assertive communication skills  role-play assertiveness skills.    Objective #B  Patient will Identify negative self-talk and behaviors: challenge core beliefs, myths, and actions.    Status: New - Date: 10/28/2020     Intervention(s)  Therapist will assign homework to engage in cognitive restructuring  teach emotional recognition/identification. and thoughts connected to these emotions.    Objective  #C  Patient will identify at least 3 alternative response(s) to aggressive behaviors.  Status: New - Date: 10/28/2020     Intervention(s)  Therapist will assign homework to engage in skills that can reduce vulnerability  provide support to engage in coping skills when feeling frustrated.      Goal 3: Patient will not procrastinate    I will know I've met my goal when I am up and doing things and when I see something that needs to be done I do it (cleaning litter boxes and taking out the garbage).      Objective #A (Patient Action)    Status: New - Date: 10/28/2020     Patient will identify 3-5 fears / thoughts that contribute to feeling anxious.    Intervention(s)  Therapist will assign homework to pay attention to thoughts and feelings when procrastinating  provide education on the connection of thoughts and feelings related to procrastination.    Objective #B  Patient will Increase interest, engagement, and pleasure in doing things.    Status: New - Date: 10/28/2020     Intervention(s)  Therapist will assign homework to engage in a daily schedule  teach how to improve motivation.    Objective #C  Patient will Feel less tired and more energy during the day .  Status: New - Date: 10/28/2020     Intervention(s)  Therapist will assign homework to engage in activity to increase motivation by gaging amount of energy activities take  teach improving motivation skills by gaging energy level of activities.      Patient has not reviewed nor agreed to the above plan.      Veronica Chavis, [facility]  October 28, 2020

## 2020-11-29 ENCOUNTER — HEALTH MAINTENANCE LETTER (OUTPATIENT)
Age: 60
End: 2020-11-29

## 2020-12-03 ENCOUNTER — VIRTUAL VISIT (OUTPATIENT)
Dept: BEHAVIORAL HEALTH | Facility: CLINIC | Age: 60
End: 2020-12-03
Payer: MEDICARE

## 2020-12-03 DIAGNOSIS — F33.0 MILD EPISODE OF RECURRENT MAJOR DEPRESSIVE DISORDER (H): Primary | ICD-10-CM

## 2020-12-03 PROCEDURE — 90834 PSYTX W PT 45 MINUTES: CPT | Mod: 95 | Performed by: SOCIAL WORKER

## 2020-12-03 ASSESSMENT — PATIENT HEALTH QUESTIONNAIRE - PHQ9: SUM OF ALL RESPONSES TO PHQ QUESTIONS 1-9: 9

## 2020-12-03 NOTE — PROGRESS NOTES
Progress Note    Patient Name: Steve Morgan  Date: 11/19/2020         Service Type: Individual      Session Start Time: 11:00  Session End Time: 11:52     Session Length: 52    Session #: 5    Attendees: Client attended alone    Service Modality:  Video Visit:    Telemedicine Visit: The patient's condition can be safely assessed and treated via synchronous audio and visual telemedicine encounter.      Reason for Telemedicine Visit: Services only offered telehealth    Originating Site (Patient Location): Patient's home    Distant Site (Provider Location): Provider Remote Setting    Consent:  The patient/guardian has verbally consented to: the potential risks and benefits of telemedicine (video visit) versus in person care; bill my insurance or make self-payment for services provided; and responsibility for payment of non-covered services.     Patient would like the video invitation sent by: Send to e-mail at: [email or web address]    Mode of Communication:  Video Conference via [employer]    As the provider I attest to compliance with applicable laws and regulations related to telemedicine.     Treatment Plan Last Reviewed: 10/28/2020  PHQ-9 / JOSE MANUEL-7 : 9 - 12/3/2020    DATA  Interactive Complexity: No  Crisis: No       Progress Since Last Session (Related to Symptoms / Goals / Homework):   Symptoms: Improving Patient reports sleep improving and that he has been engaging in a hobby.    Homework: Achieved / completed to satisfaction  Completed in session      Episode of Care Goals: Achieved / completed to satisfaction - PREPARATION (Decided to change - considering how); Intervened by negotiating a change plan and determining options / strategies for behavior change, identifying triggers, exploring social supports, and working towards setting a date to begin behavior change     Current / Ongoing Stressors and Concerns:   Patient reports stressors of getting angry while driving and  "that this has caused him and his wife to experience a frightening situation with another .  Patient reports that he becomes mad with the people that he sees most in his life, his parents, his son, his wife and his cat.  He also notes another concern that causes stress in this area of his life is his procrastination and that he spends most of his day \"on the couch\".       Treatment Objective(s) Addressed in This Session:   Identify negative self-talk and behaviors: challenge core beliefs, myths, and actions     Intervention:   CBT: Patient reviewed cognitive distortions and reported being able to identify with many.  He reported that he feels that he has always been a negative person and spends a lot of time reflecting on the past.  He noted that in his childhood people would criticize him and tell him to do better, but never give him the tools to do better.  Patient and writer reflected on the imperfetion of adult figures and how that can lead to people feeling like there is something wrong with them.  Caio began learning cognitive restructuring with his issue of getting angry while feeding cats.  Patient was open to trying out new realistic thought over the next week.       ASSESSMENT: Current Emotional / Mental Status (status of significant symptoms):   Risk status (Self / Other harm or suicidal ideation)   Patient denies current fears or concerns for personal safety.   Patient denies current or recent suicidal ideation or behaviors.   Patient denies current or recent homicidal ideation or behaviors.   Patient denies current or recent self injurious behavior or ideation.   Patient denies other safety concerns.   Patient reports there has been no change in risk factors since their last session.     Patient reports there has been no change in protective factors since their last session.     Recommended that patient call 911 or go to the local ED should there be a change in any of these risk " factors.     Appearance:   Appropriate    Eye Contact:   Good    Psychomotor Behavior: Retarded (Slowed)    Attitude:   Cooperative  Interested Friendly   Orientation:   All   Speech    Rate / Production: Slow     Volume:  Normal - patient reports he speaks loudly   Mood:    Euthymic   Affect:    Subdued    Thought Content:  Clear  Rumination    Thought Form:  Coherent  Logical  Circumstantial   Insight:    Good  and Fair      Medication Review:   No changes to current psychiatric medication(s)     Medication Compliance:   Yes     Changes in Health Issues:   None reported     Chemical Use Review:   Substance Use: Chemical use reviewed, no active concerns identified      Tobacco Use: No current tobacco use.      Diagnosis:  1. Mild episode of recurrent major depressive disorder (H)        Collateral Reports Completed:   Not Applicable    PLAN: (Patient Tasks / Therapist Tasks / Other)  Patient will reflect on new thought while feeding cats        Veronica Chavis, SAURABH                                                         ______________________________________________________________________    Treatment Plan    Patient's Name: Steve Morgan  YOB: 1960    Date: 10/28/2020    DSM5 Diagnoses: 296.32 (F33.1) Major Depressive Disorder, Recurrent Episode, Moderate With melancholic features  Psychosocial / Contextual Factors: Individual Factors Patient reports that he has family friend's but otherwise is socially isolated, Community Factors Patient reports that he was volunteering, but has been unable to due to finanical reasons and Health- Seeking Factors Patient reports that neuropathy from diabetes has made it so that he cannot work and creates other difficulties for him.    WHODAS: 20    Referral / Collaboration:  Referral to another professional/service is not indicated at this time..    Anticipated number of session or this episode of care: 30-40      MeasurableTreatment Goal(s) related to  diagnosis / functional impairment(s)  Goal 1: Patient will manage my anger when I am driving    I will know I've met my goal when I am a calmer drive and would be able to let go of a situation that I don't agree with.      Objective #A (Patient Action)    Patient will use progressive relaxation exercise once in the morning and once in the evening to help relieve tension  practice deep diaphragm breathing once daily for at least 5 minutes to reduce anger / irritability and regain a calmer, more clear state of mind.  Status: New - Date: 10/28/2020     Intervention(s)  Therapist will assign homework to engage in progressive musle relaxation or breathing depending on patient preference  teach teach breathing or progressive muscle relaxation.    Objective #B  Patient will identify patterns of escalation  (i.e. tightness in chest, flushed face, increased heart rate, clenched hands, etc.).  Status: New - Date: 10/28/2020     Intervention(s)  Therapist will assign homework to engage in using skills to help with reducing anger  role-play using skills in the moment  teach cognitive distortions.    Objective #C  Patient will practice one mindfulness skill each day for 5 minutes.  Status: New - Date: 10/28/2020     Intervention(s)  Therapist will assign homework practice mindfulness while driving  teach mindfulness.      Goal 2: Patient will be able to have better communication with his wife    I will know I've met my goal when I am not raising my voice when I am frustrated.      Objective #A (Patient Action)    Status: New - Date: 10/28/2020     Patient will learn & utilize at least 1 assertive communication skills weekly.    Intervention(s)  Therapist will assign homework to engage in assertive communication skills  role-play assertiveness skills.    Objective #B  Patient will Identify negative self-talk and behaviors: challenge core beliefs, myths, and actions.    Status: New - Date: 10/28/2020     Intervention(s)  Therapist  will assign homework to engage in cognitive restructuring  teach emotional recognition/identification. and thoughts connected to these emotions.    Objective #C  Patient will identify at least 3 alternative response(s) to aggressive behaviors.  Status: New - Date: 10/28/2020     Intervention(s)  Therapist will assign homework to engage in skills that can reduce vulnerability  provide support to engage in coping skills when feeling frustrated.      Goal 3: Patient will not procrastinate    I will know I've met my goal when I am up and doing things and when I see something that needs to be done I do it (cleaning litter boxes and taking out the garbage).      Objective #A (Patient Action)    Status: New - Date: 10/28/2020     Patient will identify 3-5 fears / thoughts that contribute to feeling anxious.    Intervention(s)  Therapist will assign homework to pay attention to thoughts and feelings when procrastinating  provide education on the connection of thoughts and feelings related to procrastination.    Objective #B  Patient will Increase interest, engagement, and pleasure in doing things.    Status: New - Date: 10/28/2020     Intervention(s)  Therapist will assign homework to engage in a daily schedule  teach how to improve motivation.    Objective #C  Patient will Feel less tired and more energy during the day .  Status: New - Date: 10/28/2020     Intervention(s)  Therapist will assign homework to engage in activity to increase motivation by gaging amount of energy activities take  teach improving motivation skills by gaging energy level of activities.      Patient has not reviewed nor agreed to the above plan.      Veronica Chavis, [facility]  October 28, 2020

## 2020-12-10 ENCOUNTER — VIRTUAL VISIT (OUTPATIENT)
Dept: BEHAVIORAL HEALTH | Facility: CLINIC | Age: 60
End: 2020-12-10
Payer: MEDICARE

## 2020-12-10 DIAGNOSIS — F33.0 MILD EPISODE OF RECURRENT MAJOR DEPRESSIVE DISORDER (H): Primary | ICD-10-CM

## 2020-12-10 PROCEDURE — 90834 PSYTX W PT 45 MINUTES: CPT | Mod: 95 | Performed by: SOCIAL WORKER

## 2020-12-10 NOTE — PROGRESS NOTES
Progress Note    Patient Name: Steve Morgan  Date: 12/10/2020         Service Type: Individual      Session Start Time: 14:30  Session End Time: 15:22     Session Length: 52    Session #: 6    Attendees: Client attended alone    Service Modality:  Video Visit:    Telemedicine Visit: The patient's condition can be safely assessed and treated via synchronous audio and visual telemedicine encounter.      Reason for Telemedicine Visit: Services only offered telehealth    Originating Site (Patient Location): Patient's home    Distant Site (Provider Location): Provider Remote Setting    Consent:  The patient/guardian has verbally consented to: the potential risks and benefits of telemedicine (video visit) versus in person care; bill my insurance or make self-payment for services provided; and responsibility for payment of non-covered services.     Patient would like the video invitation sent by: Send to e-mail at: [email or web address]    Mode of Communication:  Video Conference via [employer]well    As the provider I attest to compliance with applicable laws and regulations related to telemedicine.     Treatment Plan Last Reviewed: 10/28/2020  PHQ-9 / JOSE MANUEL-7 : 9 - 12/3/2020    DATA  Interactive Complexity: No  Crisis: No       Progress Since Last Session (Related to Symptoms / Goals / Homework):   Symptoms: No change Patient reports experiencing some anger when forgetting to take medications, but otherwise no changes.    Homework: Achieved / completed to satisfaction      Episode of Care Goals: Achieved / completed to satisfaction - ACTION (Actively working towards change); Intervened by reinforcing change plan / affirming steps taken     Current / Ongoing Stressors and Concerns:   Patient reports stressors of getting angry while driving and that this has caused him and his wife to experience a frightening situation with another .  Patient reports that he becomes mad with the  "people that he sees most in his life, his parents, his son, his wife and his cat.  He also notes another concern that causes stress in this area of his life is his procrastination and that he spends most of his day \"on the couch\".       Treatment Objective(s) Addressed in This Session:   identify patterns of escalation  (i.e. tightness in chest, flushed face, increased heart rate, clenched hands, etc.)  Identify negative self-talk and behaviors: challenge core beliefs, myths, and actions     Intervention:   CBT: Discussed with patient success with new thought, patient reported that it reduced the amount of anger he felt  He noted continuing to experience some anger.  Patient reported wanting to discuss how he can become upset and tell others what to do.  Used the recent situation with his wife to discuss cognitive restructuring and look at how the thought \"there is nothing I can do\" leads him to becoming more angry and telling others what to do.  Patient agreed when he notices this thought he can ask his wife what would be helpful for her or what she needs.       ASSESSMENT: Current Emotional / Mental Status (status of significant symptoms):   Risk status (Self / Other harm or suicidal ideation)   Patient denies current fears or concerns for personal safety.   Patient denies current or recent suicidal ideation or behaviors.   Patient denies current or recent homicidal ideation or behaviors.   Patient denies current or recent self injurious behavior or ideation.   Patient denies other safety concerns.   Patient reports there has been no change in risk factors since their last session.     Patient reports there has been no change in protective factors since their last session.     Recommended that patient call 911 or go to the local ED should there be a change in any of these risk factors.     Appearance:   Appropriate    Eye Contact:   Good    Psychomotor Behavior: Retarded (Slowed)    Attitude:   Cooperative  " "Interested Friendly   Orientation:   All   Speech    Rate / Production: Slow     Volume:  Normal - patient reports he speaks loudly   Mood:    Normal   Affect:    Subdued    Thought Content:  Clear  Rumination    Thought Form:  Coherent  Logical  Circumstantial   Insight:    Good  and Fair      Medication Review:   No changes to current psychiatric medication(s)     Medication Compliance:   Yes     Changes in Health Issues:   None reported     Chemical Use Review:   Substance Use: Chemical use reviewed, no active concerns identified      Tobacco Use: No current tobacco use.      Diagnosis:  1. Mild episode of recurrent major depressive disorder (H)        Collateral Reports Completed:   Not Applicable    PLAN: (Patient Tasks / Therapist Tasks / Other)  Patient will note when he experiences the thought \"there is nothing I can do\" as a time to ask his wife what she needs.        Veronica Chavis, SAURABH                                                         ______________________________________________________________________    Treatment Plan    Patient's Name: Steve Morgan  YOB: 1960    Date: 10/28/2020    DSM5 Diagnoses: 296.32 (F33.1) Major Depressive Disorder, Recurrent Episode, Moderate With melancholic features  Psychosocial / Contextual Factors: Individual Factors Patient reports that he has family friend's but otherwise is socially isolated, Community Factors Patient reports that he was volunteering, but has been unable to due to finanical reasons and Health- Seeking Factors Patient reports that neuropathy from diabetes has made it so that he cannot work and creates other difficulties for him.    WHODAS: 20    Referral / Collaboration:  Referral to another professional/service is not indicated at this time..    Anticipated number of session or this episode of care: 30-40      MeasurableTreatment Goal(s) related to diagnosis / functional impairment(s)  Goal 1: Patient will manage my anger " when I am driving    I will know I've met my goal when I am a calmer drive and would be able to let go of a situation that I don't agree with.      Objective #A (Patient Action)    Patient will use progressive relaxation exercise once in the morning and once in the evening to help relieve tension  practice deep diaphragm breathing once daily for at least 5 minutes to reduce anger / irritability and regain a calmer, more clear state of mind.  Status: New - Date: 10/28/2020     Intervention(s)  Therapist will assign homework to engage in progressive musle relaxation or breathing depending on patient preference  teach teach breathing or progressive muscle relaxation.    Objective #B  Patient will identify patterns of escalation  (i.e. tightness in chest, flushed face, increased heart rate, clenched hands, etc.).  Status: New - Date: 10/28/2020     Intervention(s)  Therapist will assign homework to engage in using skills to help with reducing anger  role-play using skills in the moment  teach cognitive distortions.    Objective #C  Patient will practice one mindfulness skill each day for 5 minutes.  Status: New - Date: 10/28/2020     Intervention(s)  Therapist will assign homework practice mindfulness while driving  teach mindfulness.      Goal 2: Patient will be able to have better communication with his wife    I will know I've met my goal when I am not raising my voice when I am frustrated.      Objective #A (Patient Action)    Status: New - Date: 10/28/2020     Patient will learn & utilize at least 1 assertive communication skills weekly.    Intervention(s)  Therapist will assign homework to engage in assertive communication skills  role-play assertiveness skills.    Objective #B  Patient will Identify negative self-talk and behaviors: challenge core beliefs, myths, and actions.    Status: New - Date: 10/28/2020     Intervention(s)  Therapist will assign homework to engage in cognitive restructuring  teach emotional  recognition/identification. and thoughts connected to these emotions.    Objective #C  Patient will identify at least 3 alternative response(s) to aggressive behaviors.  Status: New - Date: 10/28/2020     Intervention(s)  Therapist will assign homework to engage in skills that can reduce vulnerability  provide support to engage in coping skills when feeling frustrated.      Goal 3: Patient will not procrastinate    I will know I've met my goal when I am up and doing things and when I see something that needs to be done I do it (cleaning litter boxes and taking out the garbage).      Objective #A (Patient Action)    Status: New - Date: 10/28/2020     Patient will identify 3-5 fears / thoughts that contribute to feeling anxious.    Intervention(s)  Therapist will assign homework to pay attention to thoughts and feelings when procrastinating  provide education on the connection of thoughts and feelings related to procrastination.    Objective #B  Patient will Increase interest, engagement, and pleasure in doing things.    Status: New - Date: 10/28/2020     Intervention(s)  Therapist will assign homework to engage in a daily schedule  teach how to improve motivation.    Objective #C  Patient will Feel less tired and more energy during the day .  Status: New - Date: 10/28/2020     Intervention(s)  Therapist will assign homework to engage in activity to increase motivation by gaging amount of energy activities take  teach improving motivation skills by gaging energy level of activities.      Patient has not reviewed nor agreed to the above plan.      Veronica Chavis, [facility]  October 28, 2020

## 2020-12-16 DIAGNOSIS — R73.9 HYPERGLYCEMIA: ICD-10-CM

## 2020-12-16 DIAGNOSIS — E11.40 TYPE 2 DIABETES MELLITUS WITH DIABETIC NEUROPATHY, WITHOUT LONG-TERM CURRENT USE OF INSULIN (H): ICD-10-CM

## 2020-12-17 ENCOUNTER — VIRTUAL VISIT (OUTPATIENT)
Dept: PSYCHOLOGY | Facility: CLINIC | Age: 60
End: 2020-12-17
Payer: MEDICARE

## 2020-12-17 DIAGNOSIS — F33.0 MILD EPISODE OF RECURRENT MAJOR DEPRESSIVE DISORDER (H): Primary | ICD-10-CM

## 2020-12-17 PROCEDURE — 90834 PSYTX W PT 45 MINUTES: CPT | Mod: 95 | Performed by: SOCIAL WORKER

## 2020-12-17 RX ORDER — GLIPIZIDE 10 MG/1
TABLET, FILM COATED, EXTENDED RELEASE ORAL
Qty: 180 TABLET | Refills: 1 | Status: SHIPPED | OUTPATIENT
Start: 2020-12-17 | End: 2021-08-24

## 2020-12-17 ASSESSMENT — PATIENT HEALTH QUESTIONNAIRE - PHQ9: SUM OF ALL RESPONSES TO PHQ QUESTIONS 1-9: 12

## 2020-12-17 NOTE — PROGRESS NOTES
Progress Note    Patient Name: Steve Morgan  Date: 12/17/2020         Service Type: Individual      Session Start Time: 10:10  Session End Time: 10:58     Session Length: 48    Session #: 7    Attendees: Client attended alone    Service Modality:  Video Visit:    Telemedicine Visit: The patient's condition can be safely assessed and treated via synchronous audio and visual telemedicine encounter.      Reason for Telemedicine Visit: Services only offered telehealth    Originating Site (Patient Location): Patient's home    Distant Site (Provider Location): Provider Remote Setting    Consent:  The patient/guardian has verbally consented to: the potential risks and benefits of telemedicine (video visit) versus in person care; bill my insurance or make self-payment for services provided; and responsibility for payment of non-covered services.     Patient would like the video invitation sent by: Send to e-mail at: [email or web address]    Mode of Communication:  Video Conference via [employer]    As the provider I attest to compliance with applicable laws and regulations related to telemedicine.     Treatment Plan Last Reviewed: 10/28/2020  PHQ-9 / JOSE MANUEL-7 : 12 - 12/17/2020    DATA  Interactive Complexity: No  Crisis: No       Progress Since Last Session (Related to Symptoms / Goals / Homework):   Symptoms: Improving Patient reports that he is feeling better.  He did note that it would be helpful for him to begin exercising to help with some of the other things he is experiencing.     Homework: Achieved / completed to satisfaction      Episode of Care Goals: Achieved / completed to satisfaction - ACTION (Actively working towards change); Intervened by reinforcing change plan / affirming steps taken     Current / Ongoing Stressors and Concerns:   Patient reports stressors of getting angry while driving and that this has caused him and his wife to experience a frightening situation with  "another .  Patient reports that he becomes mad with the people that he sees most in his life, his parents, his son, his wife and his cat.  He also notes another concern that causes stress in this area of his life is his procrastination and that he spends most of his day \"on the couch\".       Treatment Objective(s) Addressed in This Session:   Increase interest, engagement, and pleasure in doing things  Identify negative self-talk and behaviors: challenge core beliefs, myths, and actions     Intervention:   CBT: Discussed with patient how he can break getting on the elliptical into smaller steps to help him with starting to exercise.  Patient reported he can try just working out for a minute.  That he can exercise between 9-10am.  He also stated his reward could be watching Star Trek.  Patient also reviewed success with his new thought of providing his wife support versus trying to fix the problem.  Patient reported that he thought his wife felt more supported and that he was supporting strengthening their relationship.;       ASSESSMENT: Current Emotional / Mental Status (status of significant symptoms):   Risk status (Self / Other harm or suicidal ideation)   Patient denies current fears or concerns for personal safety.   Patient denies current or recent suicidal ideation or behaviors.   Patient denies current or recent homicidal ideation or behaviors.   Patient denies current or recent self injurious behavior or ideation.   Patient denies other safety concerns.   Patient reports there has been no change in risk factors since their last session.     Patient reports there has been no change in protective factors since their last session.     Recommended that patient call 911 or go to the local ED should there be a change in any of these risk factors.     Appearance:   Appropriate    Eye Contact:   Good    Psychomotor Behavior: Retarded (Slowed)    Attitude:   Cooperative  Interested " Friendly   Orientation:   All   Speech    Rate / Production: Slow     Volume:  Normal - patient reports he speaks loudly   Mood:    Euthymic   Affect:    Subdued    Thought Content:  Clear  Rumination    Thought Form:  Coherent  Logical  Circumstantial   Insight:    Good  and Fair      Medication Review:   No changes to current psychiatric medication(s)     Medication Compliance:   Yes     Changes in Health Issues:   None reported     Chemical Use Review:   Substance Use: Chemical use reviewed, no active concerns identified      Tobacco Use: No current tobacco use.      Diagnosis:  1. Mild episode of recurrent major depressive disorder (H)        Collateral Reports Completed:   Not Applicable    PLAN: (Patient Tasks / Therapist Tasks / Other)  Patient will get on the elliptical for a minute each day.        Veronica Chavis, LICSW                                                         ______________________________________________________________________    Treatment Plan    Patient's Name: Steve Morgan  YOB: 1960    Date: 10/28/2020    DSM5 Diagnoses: 296.32 (F33.1) Major Depressive Disorder, Recurrent Episode, Moderate With melancholic features  Psychosocial / Contextual Factors: Individual Factors Patient reports that he has family friend's but otherwise is socially isolated, Community Factors Patient reports that he was volunteering, but has been unable to due to finanical reasons and Health- Seeking Factors Patient reports that neuropathy from diabetes has made it so that he cannot work and creates other difficulties for him.    WHODAS: 20    Referral / Collaboration:  Referral to another professional/service is not indicated at this time..    Anticipated number of session or this episode of care: 30-40      MeasurableTreatment Goal(s) related to diagnosis / functional impairment(s)  Goal 1: Patient will manage my anger when I am driving    I will know I've met my goal when I am a calmer  drive and would be able to let go of a situation that I don't agree with.      Objective #A (Patient Action)    Patient will use progressive relaxation exercise once in the morning and once in the evening to help relieve tension  practice deep diaphragm breathing once daily for at least 5 minutes to reduce anger / irritability and regain a calmer, more clear state of mind.  Status: New - Date: 10/28/2020     Intervention(s)  Therapist will assign homework to engage in progressive musle relaxation or breathing depending on patient preference  teach teach breathing or progressive muscle relaxation.    Objective #B  Patient will identify patterns of escalation  (i.e. tightness in chest, flushed face, increased heart rate, clenched hands, etc.).  Status: New - Date: 10/28/2020     Intervention(s)  Therapist will assign homework to engage in using skills to help with reducing anger  role-play using skills in the moment  teach cognitive distortions.    Objective #C  Patient will practice one mindfulness skill each day for 5 minutes.  Status: New - Date: 10/28/2020     Intervention(s)  Therapist will assign homework practice mindfulness while driving  teach mindfulness.      Goal 2: Patient will be able to have better communication with his wife    I will know I've met my goal when I am not raising my voice when I am frustrated.      Objective #A (Patient Action)    Status: New - Date: 10/28/2020     Patient will learn & utilize at least 1 assertive communication skills weekly.    Intervention(s)  Therapist will assign homework to engage in assertive communication skills  role-play assertiveness skills.    Objective #B  Patient will Identify negative self-talk and behaviors: challenge core beliefs, myths, and actions.    Status: New - Date: 10/28/2020     Intervention(s)  Therapist will assign homework to engage in cognitive restructuring  teach emotional recognition/identification. and thoughts connected to these  emotions.    Objective #C  Patient will identify at least 3 alternative response(s) to aggressive behaviors.  Status: New - Date: 10/28/2020     Intervention(s)  Therapist will assign homework to engage in skills that can reduce vulnerability  provide support to engage in coping skills when feeling frustrated.      Goal 3: Patient will not procrastinate    I will know I've met my goal when I am up and doing things and when I see something that needs to be done I do it (cleaning litter boxes and taking out the garbage).      Objective #A (Patient Action)    Status: New - Date: 10/28/2020     Patient will identify 3-5 fears / thoughts that contribute to feeling anxious.    Intervention(s)  Therapist will assign homework to pay attention to thoughts and feelings when procrastinating  provide education on the connection of thoughts and feelings related to procrastination.    Objective #B  Patient will Increase interest, engagement, and pleasure in doing things.    Status: New - Date: 10/28/2020     Intervention(s)  Therapist will assign homework to engage in a daily schedule  teach how to improve motivation.    Objective #C  Patient will Feel less tired and more energy during the day .  Status: New - Date: 10/28/2020     Intervention(s)  Therapist will assign homework to engage in activity to increase motivation by gaging amount of energy activities take  teach improving motivation skills by gaging energy level of activities.      Patient has reviewed and agreed to the above plan.      Veronica Chavis, [facility]  October 28, 2020

## 2020-12-22 ENCOUNTER — TELEPHONE (OUTPATIENT)
Dept: FAMILY MEDICINE | Facility: CLINIC | Age: 60
End: 2020-12-22

## 2020-12-22 DIAGNOSIS — E11.40 TYPE 2 DIABETES MELLITUS WITH DIABETIC NEUROPATHY, WITH LONG-TERM CURRENT USE OF INSULIN (H): Primary | ICD-10-CM

## 2020-12-22 DIAGNOSIS — Z79.4 TYPE 2 DIABETES MELLITUS WITH DIABETIC NEUROPATHY, WITH LONG-TERM CURRENT USE OF INSULIN (H): Primary | ICD-10-CM

## 2020-12-22 NOTE — TELEPHONE ENCOUNTER
PT called and rescheduled appt with Dr Cindi Meraz as she is closer then the other referrals the pt was given. However she needs a referral in order for the pt to see here. Could you please put one in his chart? Thank you

## 2020-12-23 ENCOUNTER — VIRTUAL VISIT (OUTPATIENT)
Dept: PSYCHOLOGY | Facility: CLINIC | Age: 60
End: 2020-12-23
Payer: MEDICARE

## 2020-12-23 DIAGNOSIS — F33.0 MILD EPISODE OF RECURRENT MAJOR DEPRESSIVE DISORDER (H): Primary | ICD-10-CM

## 2020-12-23 PROCEDURE — 90834 PSYTX W PT 45 MINUTES: CPT | Mod: 95 | Performed by: SOCIAL WORKER

## 2020-12-23 NOTE — PROGRESS NOTES
Progress Note    Patient Name: Steve Morgan  Date: 12/23/2020         Service Type: Individual      Session Start Time: 11:00  Session End Time: 11:52     Session Length: 52    Session #: 8    Attendees: Client attended alone    Service Modality:  Video Visit:    Telemedicine Visit: The patient's condition can be safely assessed and treated via synchronous audio and visual telemedicine encounter.      Reason for Telemedicine Visit: Services only offered telehealth    Originating Site (Patient Location): Patient's home    Distant Site (Provider Location): Provider Remote Setting    Consent:  The patient/guardian has verbally consented to: the potential risks and benefits of telemedicine (video visit) versus in person care; bill my insurance or make self-payment for services provided; and responsibility for payment of non-covered services.     Patient would like the video invitation sent by: Send to e-mail at: [email or web address]    Mode of Communication:  Video Conference via [employer]    As the provider I attest to compliance with applicable laws and regulations related to telemedicine.     Treatment Plan Last Reviewed: 10/28/2020  PHQ-9 / JOSE MANUEL-7 : 12 - 12/17/2020    DATA  Interactive Complexity: No  Crisis: No       Progress Since Last Session (Related to Symptoms / Goals / Homework):   Symptoms: No change Patient noted that he is feeling giovani with upcoming holiday    Homework: Achieved / completed to satisfaction      Episode of Care Goals: Achieved / completed to satisfaction - PREPARATION (Decided to change - considering how); Intervened by negotiating a change plan and determining options / strategies for behavior change, identifying triggers, exploring social supports, and working towards setting a date to begin behavior change     Current / Ongoing Stressors and Concerns:   Patient reports stressors of getting angry while driving and that this has caused him and his  "wife to experience a frightening situation with another .  Patient reports that he becomes mad with the people that he sees most in his life, his parents, his son, his wife and his cat.  He also notes another concern that causes stress in this area of his life is his procrastination and that he spends most of his day \"on the couch\".       Treatment Objective(s) Addressed in This Session:   use relaxation strategies 1 times per day to reduce the physical symptoms of anxiety  Increase interest, engagement, and pleasure in doing things     Intervention:   CBT: Taught patient relaxed breathing as first step towards more mindfulness practices.  Patient agreed to practice daily.  Disussed what got in way of patient getting on treadmill and patient noted continuing to focus on health benefits.  Patient was encouraged to focus on the reward he set up for himself and he agreed he could do this.       ASSESSMENT: Current Emotional / Mental Status (status of significant symptoms):   Risk status (Self / Other harm or suicidal ideation)   Patient denies current fears or concerns for personal safety.   Patient denies current or recent suicidal ideation or behaviors.   Patient denies current or recent homicidal ideation or behaviors.   Patient denies current or recent self injurious behavior or ideation.   Patient denies other safety concerns.   Patient reports there has been no change in risk factors since their last session.     Patient reports there has been no change in protective factors since their last session.     Recommended that patient call 911 or go to the local ED should there be a change in any of these risk factors.     Appearance:   Appropriate    Eye Contact:   Good    Psychomotor Behavior: Retarded (Slowed)    Attitude:   Cooperative  Interested Friendly   Orientation:   All   Speech    Rate / Production: Slow     Volume:  Normal - patient reports he speaks loudly   Mood:    Euthymic   Affect:    Bright "    Thought Content:  Clear  Rumination    Thought Form:  Coherent  Logical  Circumstantial   Insight:    Good  and Fair      Medication Review:   No changes to current psychiatric medication(s)     Medication Compliance:   Yes     Changes in Health Issues:   None reported     Chemical Use Review:   Substance Use: Chemical use reviewed, no active concerns identified      Tobacco Use: No current tobacco use.      Diagnosis:  1. Mild episode of recurrent major depressive disorder (H)        Collateral Reports Completed:   Not Applicable    PLAN: (Patient Tasks / Therapist Tasks / Other)  Patient will get on the elliptical for a minute each day with the reward to watching Star Trek and will practice relaxed breathing        Veronica Chavis, LICSW                                                         ______________________________________________________________________    Treatment Plan    Patient's Name: Steve Morgan  YOB: 1960    Date: 10/28/2020    DSM5 Diagnoses: 296.32 (F33.1) Major Depressive Disorder, Recurrent Episode, Moderate With melancholic features  Psychosocial / Contextual Factors: Individual Factors Patient reports that he has family friend's but otherwise is socially isolated, Community Factors Patient reports that he was volunteering, but has been unable to due to finanical reasons and Health- Seeking Factors Patient reports that neuropathy from diabetes has made it so that he cannot work and creates other difficulties for him.    WHODAS: 20    Referral / Collaboration:  Referral to another professional/service is not indicated at this time..    Anticipated number of session or this episode of care: 30-40      MeasurableTreatment Goal(s) related to diagnosis / functional impairment(s)  Goal 1: Patient will manage my anger when I am driving    I will know I've met my goal when I am a calmer drive and would be able to let go of a situation that I don't agree with.      Objective  #A (Patient Action)    Patient will use progressive relaxation exercise once in the morning and once in the evening to help relieve tension  practice deep diaphragm breathing once daily for at least 5 minutes to reduce anger / irritability and regain a calmer, more clear state of mind.  Status: New - Date: 10/28/2020     Intervention(s)  Therapist will assign homework to engage in progressive musle relaxation or breathing depending on patient preference  teach teach breathing or progressive muscle relaxation.    Objective #B  Patient will identify patterns of escalation  (i.e. tightness in chest, flushed face, increased heart rate, clenched hands, etc.).  Status: New - Date: 10/28/2020     Intervention(s)  Therapist will assign homework to engage in using skills to help with reducing anger  role-play using skills in the moment  teach cognitive distortions.    Objective #C  Patient will practice one mindfulness skill each day for 5 minutes.  Status: New - Date: 10/28/2020     Intervention(s)  Therapist will assign homework practice mindfulness while driving  teach mindfulness.      Goal 2: Patient will be able to have better communication with his wife    I will know I've met my goal when I am not raising my voice when I am frustrated.      Objective #A (Patient Action)    Status: New - Date: 10/28/2020     Patient will learn & utilize at least 1 assertive communication skills weekly.    Intervention(s)  Therapist will assign homework to engage in assertive communication skills  role-play assertiveness skills.    Objective #B  Patient will Identify negative self-talk and behaviors: challenge core beliefs, myths, and actions.    Status: New - Date: 10/28/2020     Intervention(s)  Therapist will assign homework to engage in cognitive restructuring  teach emotional recognition/identification. and thoughts connected to these emotions.    Objective #C  Patient will identify at least 3 alternative response(s) to aggressive  behaviors.  Status: New - Date: 10/28/2020     Intervention(s)  Therapist will assign homework to engage in skills that can reduce vulnerability  provide support to engage in coping skills when feeling frustrated.      Goal 3: Patient will not procrastinate    I will know I've met my goal when I am up and doing things and when I see something that needs to be done I do it (cleaning litter boxes and taking out the garbage).      Objective #A (Patient Action)    Status: New - Date: 10/28/2020     Patient will identify 3-5 fears / thoughts that contribute to feeling anxious.    Intervention(s)  Therapist will assign homework to pay attention to thoughts and feelings when procrastinating  provide education on the connection of thoughts and feelings related to procrastination.    Objective #B  Patient will Increase interest, engagement, and pleasure in doing things.    Status: New - Date: 10/28/2020     Intervention(s)  Therapist will assign homework to engage in a daily schedule  teach how to improve motivation.    Objective #C  Patient will Feel less tired and more energy during the day .  Status: New - Date: 10/28/2020     Intervention(s)  Therapist will assign homework to engage in activity to increase motivation by gaging amount of energy activities take  teach improving motivation skills by gaging energy level of activities.      Patient has reviewed and agreed to the above plan.      SAURABH Domínguez  October 28, 2020

## 2021-01-07 ENCOUNTER — VIRTUAL VISIT (OUTPATIENT)
Dept: PSYCHOLOGY | Facility: CLINIC | Age: 61
End: 2021-01-07
Payer: MEDICARE

## 2021-01-07 DIAGNOSIS — F33.0 MILD EPISODE OF RECURRENT MAJOR DEPRESSIVE DISORDER (H): Primary | ICD-10-CM

## 2021-01-07 PROCEDURE — 90834 PSYTX W PT 45 MINUTES: CPT | Mod: 95 | Performed by: SOCIAL WORKER

## 2021-01-07 ASSESSMENT — PATIENT HEALTH QUESTIONNAIRE - PHQ9: SUM OF ALL RESPONSES TO PHQ QUESTIONS 1-9: 13

## 2021-01-07 NOTE — PROGRESS NOTES
Progress Note    Patient Name: Steve Morgan  Date: 1/7/2021         Service Type: Individual      Session Start Time: 11:00  Session End Time: 11:52     Session Length: 52    Session #: 9    Attendees: Client attended alone    Service Modality:  Video Visit:    Telemedicine Visit: The patient's condition can be safely assessed and treated via synchronous audio and visual telemedicine encounter.      Reason for Telemedicine Visit: Services only offered telehealth    Originating Site (Patient Location): Patient's home    Distant Site (Provider Location): Provider Remote Setting    Consent:  The patient/guardian has verbally consented to: the potential risks and benefits of telemedicine (video visit) versus in person care; bill my insurance or make self-payment for services provided; and responsibility for payment of non-covered services.     Patient would like the video invitation sent by: Send to e-mail at: [email or web address]    Mode of Communication:  Video Conference via [employer]    As the provider I attest to compliance with applicable laws and regulations related to telemedicine.     Treatment Plan Last Reviewed: 10/28/2020  PHQ-9 / JOSE MANUEL-7 : 13 - 1/7/2021    DATA  Interactive Complexity: No  Crisis: No       Progress Since Last Session (Related to Symptoms / Goals / Homework):   Symptoms: No change While patient reports feeling no change in terms of his depression he did note worsening depression on his PHQ-9    Homework: Partially completed - has been engaging in breathing      Episode of Care Goals: Achieved / completed to satisfaction - PREPARATION (Decided to change - considering how); Intervened by negotiating a change plan and determining options / strategies for behavior change, identifying triggers, exploring social supports, and working towards setting a date to begin behavior change     Current / Ongoing Stressors and Concerns:   Patient reports stressors of  "getting angry while driving and that this has caused him and his wife to experience a frightening situation with another .  Patient reports that he becomes mad with the people that he sees most in his life, his parents, his son, his wife and his cat.  He also notes another concern that causes stress in this area of his life is his procrastination and that he spends most of his day \"on the couch\".       Treatment Objective(s) Addressed in This Session:   Increase interest, engagement, and pleasure in doing things     Intervention:   ACT: Discussed with patient what got in the way of doing the ellipitcal.  Patient can state all the reasons it would be good to get on the elliptical, but reports being stuck.  Patient discussed with writer concerns about his diabetes.  He discussed his values related to his health and noted most importantly that he wants to be as independent as possible as he ages.  He noted that his actions have not been in support of this value.  He reported that an action towards this value would be counting carbs before meals and that looking at what to do versus figuring out what is stopping him feels more helpful.       ASSESSMENT: Current Emotional / Mental Status (status of significant symptoms):   Risk status (Self / Other harm or suicidal ideation)   Patient denies current fears or concerns for personal safety.   Patient denies current or recent suicidal ideation or behaviors.   Patient denies current or recent homicidal ideation or behaviors.   Patient denies current or recent self injurious behavior or ideation.   Patient denies other safety concerns.   Patient reports there has been no change in risk factors since their last session.     Patient reports there has been no change in protective factors since their last session.     Recommended that patient call 911 or go to the local ED should there be a change in any of these risk factors.     Appearance:   Appropriate    Eye " Contact:   Good    Psychomotor Behavior: Retarded (Slowed)    Attitude:   Cooperative  Interested Friendly   Orientation:   All   Speech    Rate / Production: Normal/ Responsive    Volume:  Normal - patient reports he speaks loudly   Mood:    Normal   Affect:    Appropriate    Thought Content:  Clear  Rumination    Thought Form:  Coherent  Logical  Circumstantial   Insight:    Good  and Fair      Medication Review:   No changes to current psychiatric medication(s)     Medication Compliance:   Yes     Changes in Health Issues:   None reported     Chemical Use Review:   Substance Use: Chemical use reviewed, no active concerns identified      Tobacco Use: No current tobacco use.      Diagnosis:  1. Mild episode of recurrent major depressive disorder (H)        Collateral Reports Completed:   Not Applicable    PLAN: (Patient Tasks / Therapist Tasks / Other)  Patient will use his value of being independent as possible as he gets older to work on counting carbs before meals        SAURABH Domínguez                                                         ______________________________________________________________________    Treatment Plan    Patient's Name: Steve Morgan  YOB: 1960    Date: 10/28/2020    DSM5 Diagnoses: 296.32 (F33.1) Major Depressive Disorder, Recurrent Episode, Moderate With melancholic features  Psychosocial / Contextual Factors: Individual Factors Patient reports that he has family friend's but otherwise is socially isolated, Community Factors Patient reports that he was volunteering, but has been unable to due to finanical reasons and Health- Seeking Factors Patient reports that neuropathy from diabetes has made it so that he cannot work and creates other difficulties for him.    WHODAS: 20    Referral / Collaboration:  Referral to another professional/service is not indicated at this time..    Anticipated number of session or this episode of care:  30-40      MeasurableTreatment Goal(s) related to diagnosis / functional impairment(s)  Goal 1: Patient will manage my anger when I am driving    I will know I've met my goal when I am a calmer drive and would be able to let go of a situation that I don't agree with.      Objective #A (Patient Action)    Patient will use progressive relaxation exercise once in the morning and once in the evening to help relieve tension  practice deep diaphragm breathing once daily for at least 5 minutes to reduce anger / irritability and regain a calmer, more clear state of mind.  Status: New - Date: 10/28/2020     Intervention(s)  Therapist will assign homework to engage in progressive musle relaxation or breathing depending on patient preference  teach teach breathing or progressive muscle relaxation.    Objective #B  Patient will identify patterns of escalation  (i.e. tightness in chest, flushed face, increased heart rate, clenched hands, etc.).  Status: New - Date: 10/28/2020     Intervention(s)  Therapist will assign homework to engage in using skills to help with reducing anger  role-play using skills in the moment  teach cognitive distortions.    Objective #C  Patient will practice one mindfulness skill each day for 5 minutes.  Status: New - Date: 10/28/2020     Intervention(s)  Therapist will assign homework practice mindfulness while driving  teach mindfulness.      Goal 2: Patient will be able to have better communication with his wife    I will know I've met my goal when I am not raising my voice when I am frustrated.      Objective #A (Patient Action)    Status: New - Date: 10/28/2020     Patient will learn & utilize at least 1 assertive communication skills weekly.    Intervention(s)  Therapist will assign homework to engage in assertive communication skills  role-play assertiveness skills.    Objective #B  Patient will Identify negative self-talk and behaviors: challenge core beliefs, myths, and actions.    Status: New  - Date: 10/28/2020     Intervention(s)  Therapist will assign homework to engage in cognitive restructuring  teach emotional recognition/identification. and thoughts connected to these emotions.    Objective #C  Patient will identify at least 3 alternative response(s) to aggressive behaviors.  Status: New - Date: 10/28/2020     Intervention(s)  Therapist will assign homework to engage in skills that can reduce vulnerability  provide support to engage in coping skills when feeling frustrated.      Goal 3: Patient will not procrastinate    I will know I've met my goal when I am up and doing things and when I see something that needs to be done I do it (cleaning litter boxes and taking out the garbage).      Objective #A (Patient Action)    Status: New - Date: 10/28/2020     Patient will identify 3-5 fears / thoughts that contribute to feeling anxious.    Intervention(s)  Therapist will assign homework to pay attention to thoughts and feelings when procrastinating  provide education on the connection of thoughts and feelings related to procrastination.    Objective #B  Patient will Increase interest, engagement, and pleasure in doing things.    Status: New - Date: 10/28/2020     Intervention(s)  Therapist will assign homework to engage in a daily schedule  teach how to improve motivation.    Objective #C  Patient will Feel less tired and more energy during the day .  Status: New - Date: 10/28/2020     Intervention(s)  Therapist will assign homework to engage in activity to increase motivation by gaging amount of energy activities take  teach improving motivation skills by gaging energy level of activities.      Patient has reviewed and agreed to the above plan.      Veronica Chavis, [facility]  October 28, 2020

## 2021-01-14 ENCOUNTER — VIRTUAL VISIT (OUTPATIENT)
Dept: PSYCHOLOGY | Facility: CLINIC | Age: 61
End: 2021-01-14
Payer: MEDICARE

## 2021-01-14 DIAGNOSIS — F33.0 MILD EPISODE OF RECURRENT MAJOR DEPRESSIVE DISORDER (H): Primary | ICD-10-CM

## 2021-01-14 PROCEDURE — 90834 PSYTX W PT 45 MINUTES: CPT | Mod: 95 | Performed by: SOCIAL WORKER

## 2021-01-14 NOTE — PROGRESS NOTES
Progress Note    Patient Name: Steve Morgan  Date: 1/14/2021         Service Type: Individual      Session Start Time: 11:00  Session End Time: 11:49     Session Length: 49    Session #: 10    Attendees: Client attended alone    Service Modality:  Video Visit:    Telemedicine Visit: The patient's condition can be safely assessed and treated via synchronous audio and visual telemedicine encounter.      Reason for Telemedicine Visit: Services only offered telehealth    Originating Site (Patient Location): Patient's home    Distant Site (Provider Location): Provider Remote Setting    Consent:  The patient/guardian has verbally consented to: the potential risks and benefits of telemedicine (video visit) versus in person care; bill my insurance or make self-payment for services provided; and responsibility for payment of non-covered services.     Patient would like the video invitation sent by: Send to e-mail at: [email or web address]    Mode of Communication:  Video Conference via [employer]    As the provider I attest to compliance with applicable laws and regulations related to telemedicine.     Treatment Plan Last Reviewed: 10/28/2020  PHQ-9 / JOSE MANUEL-7 : 13 - 1/7/2021    DATA  Interactive Complexity: No  Crisis: No       Progress Since Last Session (Related to Symptoms / Goals / Homework):   Symptoms: No change Patient reports that he continues to feel the same.    Homework: Partially completed - was able to count carbs some of the days      Episode of Care Goals: Achieved / completed to satisfaction - PREPARATION (Decided to change - considering how); Intervened by negotiating a change plan and determining options / strategies for behavior change, identifying triggers, exploring social supports, and working towards setting a date to begin behavior change     Current / Ongoing Stressors and Concerns:   Patient reports stressors of getting angry while driving and that this has  "caused him and his wife to experience a frightening situation with another .  Patient reports that he becomes mad with the people that he sees most in his life, his parents, his son, his wife and his cat.  He also notes another concern that causes stress in this area of his life is his procrastination and that he spends most of his day \"on the couch\".       Treatment Objective(s) Addressed in This Session:   Increase interest, engagement, and pleasure in doing things     Intervention:   ACT: Discussed patient's value of his health and the larger value of finacial stability that he has.  Patient discussed how he does not note if he uses values.  Discussed more about the concept of mindfuless and discussed patient taking moments to think about valued living each day.  Practiced with patient to think about how he used values yesterday.         ASSESSMENT: Current Emotional / Mental Status (status of significant symptoms):   Risk status (Self / Other harm or suicidal ideation)   Patient denies current fears or concerns for personal safety.   Patient denies current or recent suicidal ideation or behaviors.   Patient denies current or recent homicidal ideation or behaviors.   Patient denies current or recent self injurious behavior or ideation.   Patient denies other safety concerns.   Patient reports there has been no change in risk factors since their last session.     Patient reports there has been no change in protective factors since their last session.     Recommended that patient call 911 or go to the local ED should there be a change in any of these risk factors.     Appearance:   Appropriate    Eye Contact:   Good    Psychomotor Behavior: Retarded (Slowed)    Attitude:   Cooperative  Interested Friendly   Orientation:   All   Speech    Rate / Production: Normal/ Responsive    Volume:  Normal - patient reports he speaks loudly   Mood:    Normal   Affect:    Appropriate    Thought Content:  Clear  Rumination "    Thought Form:  Coherent  Logical  Circumstantial   Insight:    Good  and Fair      Medication Review:   No changes to current psychiatric medication(s)     Medication Compliance:   Yes     Changes in Health Issues:   None reported     Chemical Use Review:   Substance Use: Chemical use reviewed, no active concerns identified      Tobacco Use: No current tobacco use.      Diagnosis:  1. Mild episode of recurrent major depressive disorder (H)        Collateral Reports Completed:   Not Applicable    PLAN: (Patient Tasks / Therapist Tasks / Other)  Patient will use his value of being independent as possible as he gets older to work on counting carbs before meals  Patient will take time each day to think about how he has used his values of financial stability and being independent        Veronica Chavis, LICSW                                                         ______________________________________________________________________    Treatment Plan    Patient's Name: Steve Morgan  YOB: 1960    Date: 10/28/2020    DSM5 Diagnoses: 296.32 (F33.1) Major Depressive Disorder, Recurrent Episode, Moderate With melancholic features  Psychosocial / Contextual Factors: Individual Factors Patient reports that he has family friend's but otherwise is socially isolated, Community Factors Patient reports that he was volunteering, but has been unable to due to finanical reasons and Health- Seeking Factors Patient reports that neuropathy from diabetes has made it so that he cannot work and creates other difficulties for him.    WHODAS: 20    Referral / Collaboration:  Referral to another professional/service is not indicated at this time..    Anticipated number of session or this episode of care: 30-40      MeasurableTreatment Goal(s) related to diagnosis / functional impairment(s)  Goal 1: Patient will manage my anger when I am driving    I will know I've met my goal when I am a calmer drive and would be able  to let go of a situation that I don't agree with.      Objective #A (Patient Action)    Patient will use progressive relaxation exercise once in the morning and once in the evening to help relieve tension  practice deep diaphragm breathing once daily for at least 5 minutes to reduce anger / irritability and regain a calmer, more clear state of mind.  Status: New - Date: 10/28/2020     Intervention(s)  Therapist will assign homework to engage in progressive musle relaxation or breathing depending on patient preference  teach teach breathing or progressive muscle relaxation.    Objective #B  Patient will identify patterns of escalation  (i.e. tightness in chest, flushed face, increased heart rate, clenched hands, etc.).  Status: New - Date: 10/28/2020     Intervention(s)  Therapist will assign homework to engage in using skills to help with reducing anger  role-play using skills in the moment  teach cognitive distortions.    Objective #C  Patient will practice one mindfulness skill each day for 5 minutes.  Status: New - Date: 10/28/2020     Intervention(s)  Therapist will assign homework practice mindfulness while driving  teach mindfulness.      Goal 2: Patient will be able to have better communication with his wife    I will know I've met my goal when I am not raising my voice when I am frustrated.      Objective #A (Patient Action)    Status: New - Date: 10/28/2020     Patient will learn & utilize at least 1 assertive communication skills weekly.    Intervention(s)  Therapist will assign homework to engage in assertive communication skills  role-play assertiveness skills.    Objective #B  Patient will Identify negative self-talk and behaviors: challenge core beliefs, myths, and actions.    Status: New - Date: 10/28/2020     Intervention(s)  Therapist will assign homework to engage in cognitive restructuring  teach emotional recognition/identification. and thoughts connected to these emotions.    Objective  #C  Patient will identify at least 3 alternative response(s) to aggressive behaviors.  Status: New - Date: 10/28/2020     Intervention(s)  Therapist will assign homework to engage in skills that can reduce vulnerability  provide support to engage in coping skills when feeling frustrated.      Goal 3: Patient will not procrastinate    I will know I've met my goal when I am up and doing things and when I see something that needs to be done I do it (cleaning litter boxes and taking out the garbage).      Objective #A (Patient Action)    Status: New - Date: 10/28/2020     Patient will identify 3-5 fears / thoughts that contribute to feeling anxious.    Intervention(s)  Therapist will assign homework to pay attention to thoughts and feelings when procrastinating  provide education on the connection of thoughts and feelings related to procrastination.    Objective #B  Patient will Increase interest, engagement, and pleasure in doing things.    Status: New - Date: 10/28/2020     Intervention(s)  Therapist will assign homework to engage in a daily schedule  teach how to improve motivation.    Objective #C  Patient will Feel less tired and more energy during the day .  Status: New - Date: 10/28/2020     Intervention(s)  Therapist will assign homework to engage in activity to increase motivation by gaging amount of energy activities take  teach improving motivation skills by gaging energy level of activities.      Patient has reviewed and agreed to the above plan.      Veronica Chavis, SAURABH  October 28, 2020

## 2021-01-21 ENCOUNTER — VIRTUAL VISIT (OUTPATIENT)
Dept: PSYCHOLOGY | Facility: CLINIC | Age: 61
End: 2021-01-21
Payer: MEDICARE

## 2021-01-21 DIAGNOSIS — F33.0 MILD EPISODE OF RECURRENT MAJOR DEPRESSIVE DISORDER (H): Primary | ICD-10-CM

## 2021-01-21 PROCEDURE — 90834 PSYTX W PT 45 MINUTES: CPT | Mod: 95 | Performed by: SOCIAL WORKER

## 2021-01-21 ASSESSMENT — PATIENT HEALTH QUESTIONNAIRE - PHQ9: SUM OF ALL RESPONSES TO PHQ QUESTIONS 1-9: 9

## 2021-01-21 NOTE — PROGRESS NOTES
Progress Note    Patient Name: Steve Morgan  Date: 1/21/2021         Service Type: Individual      Session Start Time: 11:00  Session End Time: 11:40     Session Length: 40    Session #: 11    Attendees: Client attended alone    Service Modality:  Video Visit:    Telemedicine Visit: The patient's condition can be safely assessed and treated via synchronous audio and visual telemedicine encounter.      Reason for Telemedicine Visit: Services only offered telehealth    Originating Site (Patient Location): Patient's home    Distant Site (Provider Location): Provider Remote Setting    Consent:  The patient/guardian has verbally consented to: the potential risks and benefits of telemedicine (video visit) versus in person care; bill my insurance or make self-payment for services provided; and responsibility for payment of non-covered services.     Patient would like the video invitation sent by: Send to e-mail at: [email or web address]    Mode of Communication:  Video Conference via for; to (do)    As the provider I attest to compliance with applicable laws and regulations related to telemedicine.     Treatment Plan Last Reviewed: 10/28/2020  PHQ-9 / JOSE MANUEL-7 : 9 - 1/21/2021    DATA  Interactive Complexity: No  Crisis: No       Progress Since Last Session (Related to Symptoms / Goals / Homework):   Symptoms: Improving has been able to accomplish more    Homework: Achieved / completed to satisfaction       Episode of Care Goals: Achieved / completed to satisfaction - ACTION (Actively working towards change); Intervened by reinforcing change plan / affirming steps taken     Current / Ongoing Stressors and Concerns:   Patient reports stressors of getting angry while driving and that this has caused him and his wife to experience a frightening situation with another .  Patient reports that he becomes mad with the people that he sees most in his life, his parents, his son, his wife and  "his cat.  He also notes another concern that causes stress in this area of his life is his procrastination and that he spends most of his day \"on the couch\".       Treatment Objective(s) Addressed in This Session:   Increase interest, engagement, and pleasure in doing things     Intervention:   ACT: Discussed with patient what it has meant to be more mindful of his values.  He reported feeling better and better able to manage situations that would normally upset him.  Patient, also, notably was making plans for other changes he would like to see in his life. He reported that he has also been counting carbs more and that he also was open to setting a reminder to remember his anti-depressant medication.       ASSESSMENT: Current Emotional / Mental Status (status of significant symptoms):   Risk status (Self / Other harm or suicidal ideation)   Patient denies current fears or concerns for personal safety.   Patient denies current or recent suicidal ideation or behaviors.   Patient denies current or recent homicidal ideation or behaviors.   Patient denies current or recent self injurious behavior or ideation.   Patient denies other safety concerns.   Patient reports there has been no change in risk factors since their last session.     Patient reports there has been no change in protective factors since their last session.     Recommended that patient call 911 or go to the local ED should there be a change in any of these risk factors.     Appearance:   Appropriate    Eye Contact:   Good    Psychomotor Behavior: Retarded (Slowed)    Attitude:   Cooperative  Interested Friendly   Orientation:   All   Speech    Rate / Production: Normal/ Responsive    Volume:  Normal - patient reports he speaks loudly   Mood:    Irritable    Affect:    Appropriate    Thought Content:  Clear  Rumination    Thought Form:  Coherent  Logical    Insight:    Good  and Fair      Medication Review:   No changes to current psychiatric " medication(s)     Medication Compliance:   No patient will set a reminder     Changes in Health Issues:   None reported     Chemical Use Review:   Substance Use: Chemical use reviewed, no active concerns identified      Tobacco Use: No current tobacco use.      Diagnosis:  1. Mild episode of recurrent major depressive disorder (H)        Collateral Reports Completed:   Not Applicable    PLAN: (Patient Tasks / Therapist Tasks / Other)  Patient will continue noting, will make a practice to not turn the tv on in the morning and will start preparing for leaving the house with his diabetic supplies.        Veronica Chavis, ISAUROSW                                                         ______________________________________________________________________    Treatment Plan    Patient's Name: Steve Morgan  YOB: 1960    Date: 10/28/2020    DSM5 Diagnoses: 296.32 (F33.1) Major Depressive Disorder, Recurrent Episode, Moderate With melancholic features  Psychosocial / Contextual Factors: Individual Factors Patient reports that he has family friend's but otherwise is socially isolated, Community Factors Patient reports that he was volunteering, but has been unable to due to finanical reasons and Health- Seeking Factors Patient reports that neuropathy from diabetes has made it so that he cannot work and creates other difficulties for him.    WHODAS: 20    Referral / Collaboration:  Referral to another professional/service is not indicated at this time..    Anticipated number of session or this episode of care: 30-40      MeasurableTreatment Goal(s) related to diagnosis / functional impairment(s)  Goal 1: Patient will manage my anger when I am driving    I will know I've met my goal when I am a calmer drive and would be able to let go of a situation that I don't agree with.      Objective #A (Patient Action)    Patient will use progressive relaxation exercise once in the morning and once in the evening to  help relieve tension  practice deep diaphragm breathing once daily for at least 5 minutes to reduce anger / irritability and regain a calmer, more clear state of mind.  Status: New - Date: 10/28/2020     Intervention(s)  Therapist will assign homework to engage in progressive musle relaxation or breathing depending on patient preference  teach teach breathing or progressive muscle relaxation.    Objective #B  Patient will identify patterns of escalation  (i.e. tightness in chest, flushed face, increased heart rate, clenched hands, etc.).  Status: New - Date: 10/28/2020     Intervention(s)  Therapist will assign homework to engage in using skills to help with reducing anger  role-play using skills in the moment  teach cognitive distortions.    Objective #C  Patient will practice one mindfulness skill each day for 5 minutes.  Status: New - Date: 10/28/2020     Intervention(s)  Therapist will assign homework practice mindfulness while driving  teach mindfulness.      Goal 2: Patient will be able to have better communication with his wife    I will know I've met my goal when I am not raising my voice when I am frustrated.      Objective #A (Patient Action)    Status: New - Date: 10/28/2020     Patient will learn & utilize at least 1 assertive communication skills weekly.    Intervention(s)  Therapist will assign homework to engage in assertive communication skills  role-play assertiveness skills.    Objective #B  Patient will Identify negative self-talk and behaviors: challenge core beliefs, myths, and actions.    Status: New - Date: 10/28/2020     Intervention(s)  Therapist will assign homework to engage in cognitive restructuring  teach emotional recognition/identification. and thoughts connected to these emotions.    Objective #C  Patient will identify at least 3 alternative response(s) to aggressive behaviors.  Status: New - Date: 10/28/2020     Intervention(s)  Therapist will assign homework to engage in skills  that can reduce vulnerability  provide support to engage in coping skills when feeling frustrated.      Goal 3: Patient will not procrastinate    I will know I've met my goal when I am up and doing things and when I see something that needs to be done I do it (cleaning litter boxes and taking out the garbage).      Objective #A (Patient Action)    Status: New - Date: 10/28/2020     Patient will identify 3-5 fears / thoughts that contribute to feeling anxious.    Intervention(s)  Therapist will assign homework to pay attention to thoughts and feelings when procrastinating  provide education on the connection of thoughts and feelings related to procrastination.    Objective #B  Patient will Increase interest, engagement, and pleasure in doing things.    Status: New - Date: 10/28/2020     Intervention(s)  Therapist will assign homework to engage in a daily schedule  teach how to improve motivation.    Objective #C  Patient will Feel less tired and more energy during the day .  Status: New - Date: 10/28/2020     Intervention(s)  Therapist will assign homework to engage in activity to increase motivation by gaging amount of energy activities take  teach improving motivation skills by gaging energy level of activities.      Patient has reviewed and agreed to the above plan.      Veronica Chavis, [facility]  October 28, 2020

## 2021-02-03 ENCOUNTER — VIRTUAL VISIT (OUTPATIENT)
Dept: PSYCHOLOGY | Facility: CLINIC | Age: 61
End: 2021-02-03
Payer: MEDICARE

## 2021-02-03 DIAGNOSIS — F33.41 RECURRENT MAJOR DEPRESSIVE DISORDER, IN PARTIAL REMISSION (H): Primary | ICD-10-CM

## 2021-02-03 PROCEDURE — 90834 PSYTX W PT 45 MINUTES: CPT | Mod: 95 | Performed by: SOCIAL WORKER

## 2021-02-03 ASSESSMENT — PATIENT HEALTH QUESTIONNAIRE - PHQ9: SUM OF ALL RESPONSES TO PHQ QUESTIONS 1-9: 11

## 2021-02-03 NOTE — PROGRESS NOTES
Progress Note    Patient Name: Steve Morgan  Date: 2/3/2021         Service Type: Individual      Session Start Time: 15:30  Session End Time: 16:15     Session Length: 45    Session #: 12    Attendees: Client attended alone    Service Modality:  Video Visit:    Telemedicine Visit: The patient's condition can be safely assessed and treated via synchronous audio and visual telemedicine encounter.      Reason for Telemedicine Visit: Services only offered telehealth    Originating Site (Patient Location): Patient's home    Distant Site (Provider Location): Provider Remote Setting    Consent:  The patient/guardian has verbally consented to: the potential risks and benefits of telemedicine (video visit) versus in person care; bill my insurance or make self-payment for services provided; and responsibility for payment of non-covered services.     Patient would like the video invitation sent by: Send to e-mail at: [email or web address]    Mode of Communication:  Video Conference via [employer]    As the provider I attest to compliance with applicable laws and regulations related to telemedicine.     Treatment Plan Last Reviewed: 2/3/2021  PHQ-9 / JOSE MANUEL-7 : 11 - 2/3/2021    DATA  Interactive Complexity: No  Crisis: No       Progress Since Last Session (Related to Symptoms / Goals / Homework):   Symptoms: Improving Patient seen many depressive symptoms resolve and reports more motivation    Homework: Achieved / completed to satisfaction  Partially completed       Episode of Care Goals: Achieved / completed to satisfaction - ACTION (Actively working towards change); Intervened by reinforcing change plan / affirming steps taken     Current / Ongoing Stressors and Concerns:   Patient reports stressors of getting angry while driving and that this has caused him and his wife to experience a frightening situation with another .  Patient reports that he becomes mad with the people that he  "sees most in his life, his parents, his son, his wife and his cat.  He also notes another concern that causes stress in this area of his life is his procrastination and that he spends most of his day \"on the couch\".       Treatment Objective(s) Addressed in This Session:   Increase interest, engagement, and pleasure in doing things  learn & utilize at least 1 assertive communication skills weekly     Intervention:   CBT: Discussed with patient how he has been increasing motivation and problem-solved continued increases in motivation.  Patient discussed his recent argument with wife and how he had responded differently.  Noted with patient how past responses have been with agressive communication and that this time was with more passive agressive communication and what assertive communication looks like.       ASSESSMENT: Current Emotional / Mental Status (status of significant symptoms):   Risk status (Self / Other harm or suicidal ideation)   Patient denies current fears or concerns for personal safety.   Patient denies current or recent suicidal ideation or behaviors.   Patient denies current or recent homicidal ideation or behaviors.   Patient denies current or recent self injurious behavior or ideation.   Patient denies other safety concerns.   Patient reports there has been no change in risk factors since their last session.     Patient reports there has been no change in protective factors since their last session.     Recommended that patient call 911 or go to the local ED should there be a change in any of these risk factors.     Appearance:   Appropriate    Eye Contact:   Good    Psychomotor Behavior: Retarded (Slowed)    Attitude:   Cooperative  Interested Friendly   Orientation:   All   Speech    Rate / Production: Normal/ Responsive    Volume:  Normal - patient reports he speaks loudly   Mood:    Normal   Affect:    Appropriate    Thought Content:  Clear  Rumination    Thought Form:  Coherent  Logical "    Insight:    Good  and Fair      Medication Review:   No changes to current psychiatric medication(s)     Medication Compliance:   Yes     Changes in Health Issues:   None reported     Chemical Use Review:   Substance Use: Chemical use reviewed, no active concerns identified      Tobacco Use: No current tobacco use.      Diagnosis:  1. Recurrent major depressive disorder, in partial remission (H)        Collateral Reports Completed:   Not Applicable    PLAN: (Patient Tasks / Therapist Tasks / Other)  Patient will start preparing for leaving the house with his diabetic supplies.         eVronica Chavis, ISAUROSW                                                         ______________________________________________________________________    Treatment Plan    Patient's Name: Steve Morgan  YOB: 1960    Date: 2/3/2021    DSM5 Diagnoses: 296.32 (F33.1) Major Depressive Disorder, Recurrent Episode, Moderate With melancholic features  Psychosocial / Contextual Factors: Individual Factors Patient reports that he has family friend's but otherwise is socially isolated, Community Factors Patient reports that he was volunteering, but has been unable to due to finanical reasons and Health- Seeking Factors Patient reports that neuropathy from diabetes has made it so that he cannot work and creates other difficulties for him.    WHODAS: 20    Referral / Collaboration:  Referral to another professional/service is not indicated at this time..    Anticipated number of session or this episode of care: 30-40      MeasurableTreatment Goal(s) related to diagnosis / functional impairment(s)  Goal 1: Patient will manage my anger when I am driving    I will know I've met my goal when I am a calmer drive and would be able to let go of a situation that I don't agree with.      Objective #A (Patient Action)    Patient will use progressive relaxation exercise once in the morning and once in the evening to help relieve  tension  practice deep diaphragm breathing once daily for at least 5 minutes to reduce anger / irritability and regain a calmer, more clear state of mind.  Status: Completed - Date: 2/3/2021     Intervention(s)  Therapist will assign homework to engage in progressive musle relaxation or breathing depending on patient preference  teach teach breathing or progressive muscle relaxation.    Objective #B  Patient will identify patterns of escalation  (i.e. tightness in chest, flushed face, increased heart rate, clenched hands, etc.).  Status: Completed - Date: 2/3/2021     Intervention(s)  Therapist will assign homework to engage in using skills to help with reducing anger  role-play using skills in the moment  teach cognitive distortions.    Objective #C  Patient will practice one mindfulness skill each day for 5 minutes.  Status: Continued - Date(s): 2/3/2021     Intervention(s)  Therapist will assign homework practice mindfulness while driving  teach mindfulness.      Goal 2: Patient will be able to have better communication with his wife    I will know I've met my goal when I am not raising my voice when I am frustrated.      Objective #A (Patient Action)    Status: Continued - Date(s): 2/3/2021     Patient will learn & utilize at least 1 assertive communication skills weekly.    Intervention(s)  Therapist will assign homework to engage in assertive communication skills  role-play assertiveness skills.    Objective #B  Patient will Identify negative self-talk and behaviors: challenge core beliefs, myths, and actions.    Status: Continued - Date(s): 2/3/2021     Intervention(s)  Therapist will assign homework to engage in cognitive restructuring  teach emotional recognition/identification. and thoughts connected to these emotions.    Objective #C  Patient will identify at least 3 alternative response(s) to aggressive behaviors.  Status: Continued - Date(s): 2/3/2021     Intervention(s)  Therapist will assign homework  to engage in skills that can reduce vulnerability  provide support to engage in coping skills when feeling frustrated.      Goal 3: Patient will not procrastinate    I will know I've met my goal when I am up and doing things and when I see something that needs to be done I do it (cleaning litter boxes, taking out the garbage and taking care of mail ).      Objective #A (Patient Action)    Status: Completed - Date: 2/3/2021     Patient will identify 3-5 fears / thoughts that contribute to feeling anxious.    Intervention(s)  Therapist will assign homework to pay attention to thoughts and feelings when procrastinating  provide education on the connection of thoughts and feelings related to procrastination.    Objective #B  Patient will Increase interest, engagement, and pleasure in doing things.    Status: Completed - Date: 2/3/2021     Intervention(s)  Therapist will assign homework to engage in a daily schedule  teach how to improve motivation.    Objective #C  Patient will Feel less tired and more energy during the day .  Status: Continued - Date(s): 2/3/2021     Intervention(s)  Therapist will assign homework to engage in activity to increase motivation by gaging amount of energy activities take  teach improving motivation skills by gaging energy level of activities.      Patient has reviewed and agreed to the above plan.      Veronica Chavis, SAURABH  February 3, 2021

## 2021-02-18 DIAGNOSIS — E11.40 TYPE 2 DIABETES MELLITUS WITH DIABETIC NEUROPATHY, WITHOUT LONG-TERM CURRENT USE OF INSULIN (H): ICD-10-CM

## 2021-02-18 DIAGNOSIS — R73.9 HYPERGLYCEMIA: ICD-10-CM

## 2021-02-19 DIAGNOSIS — E11.40 TYPE 2 DIABETES MELLITUS WITH DIABETIC NEUROPATHY, WITHOUT LONG-TERM CURRENT USE OF INSULIN (H): ICD-10-CM

## 2021-02-19 DIAGNOSIS — R73.9 HYPERGLYCEMIA: ICD-10-CM

## 2021-02-19 NOTE — TELEPHONE ENCOUNTER
6 month sent 10/6/20, has endo appointment scheduled  Lucía Bond RN, BSN  Message handled by CLINIC NURSE.

## 2021-02-19 NOTE — TELEPHONE ENCOUNTER
metFORMIN (GLUCOPHAGE) 1000 MG tablet 180 tablet 1 10/6/2020     Resent per pharmacy request  Prescription approved per [facility] Refill Protocol.  Lucía Bond, RN, BSN  Message handled by CLINIC NURSE.

## 2021-02-22 DIAGNOSIS — E11.40 TYPE 2 DIABETES MELLITUS WITH DIABETIC NEUROPATHY, WITH LONG-TERM CURRENT USE OF INSULIN (H): ICD-10-CM

## 2021-02-22 DIAGNOSIS — Z79.4 TYPE 2 DIABETES MELLITUS WITH DIABETIC NEUROPATHY, WITH LONG-TERM CURRENT USE OF INSULIN (H): ICD-10-CM

## 2021-02-22 LAB — HBA1C MFR BLD: 7.4 % (ref 0–5.6)

## 2021-02-22 PROCEDURE — 36415 COLL VENOUS BLD VENIPUNCTURE: CPT | Performed by: PHYSICIAN ASSISTANT

## 2021-02-22 PROCEDURE — 83036 HEMOGLOBIN GLYCOSYLATED A1C: CPT | Performed by: PHYSICIAN ASSISTANT

## 2021-02-25 ENCOUNTER — VIRTUAL VISIT (OUTPATIENT)
Dept: ENDOCRINOLOGY | Facility: CLINIC | Age: 61
End: 2021-02-25
Payer: MEDICARE

## 2021-02-25 DIAGNOSIS — E11.40 TYPE 2 DIABETES MELLITUS WITH DIABETIC NEUROPATHY, WITH LONG-TERM CURRENT USE OF INSULIN (H): Primary | ICD-10-CM

## 2021-02-25 DIAGNOSIS — Z79.4 TYPE 2 DIABETES MELLITUS WITH DIABETIC NEUROPATHY, WITH LONG-TERM CURRENT USE OF INSULIN (H): Primary | ICD-10-CM

## 2021-02-25 PROCEDURE — 99215 OFFICE O/P EST HI 40 MIN: CPT | Mod: 95 | Performed by: INTERNAL MEDICINE

## 2021-02-25 NOTE — PATIENT INSTRUCTIONS
[facility] & [place] locations   Dr Meraz, Endocrinology Department      [facility]   [address]  [place] [address]  Appointment [phone]  Fax: [phone]  [place]: Monday and Tuesday         [facility]   [address]  [place] [address]  Appointment [phone]  Fax: [phone]  [place]: Wednesday and Thursday            Please check the cost coverage and copay with insurance before recommended tests, services and medications (especially if new medications are prescribed).     If ordered, please get blood work done 1 week prior to your next appointment so they will be available to Dr. Meraz at your visit.    To provide the best diabetic care, please bring your blood glucose meter to each and every visit with your  Endocrinologist. Your blood glucose meter/insulin pump will be downloaded at every appointment.  Please arrive 15 minutes before your scheduled appointment. This will allow for your blood glucose meter/insulin pump  to be downloaded.  If you are wearing DEXCOM please bring  or sharing code from the Dexcom Clarity Appt so that it can be downloaded.  If you are using freestyle juan personal sensors please bring the reader.  If you are using TANDEM insulin pump please have your username and password to get info from Tandem website.    Start using freestyle liber.  Scan often.  Continue current diabetes regimen as it it.   Follow up with TOSHA Garcia in 6 weeks if BG are high.  Continue levoxyl at current dose.  Labs and follow up with endocrinology in 3 .  Please make a lab appointment for blood work and follow up clinic appointment in 1 week after that to discuss results.      Please note that schedule gets booked out fast and it is difficult to add on patients when schedule is full.  So it is highly advised that you make appointment ahead of time so  that we can follow up on labs/imaging studies in timely manner. This will help us to serve you better.  Please call the clinic to make appropriate appointments.  Let us know if you have any questions or if you need any help with scheduling.      Recommend checking blood sugars before meals and at bedtime.    If Blood glucose are low more often-> 2-3 times/week- give us a call.  The patient is advised to Make better food choices: reduce carbs, Reduce portion size, weight loss and exercise 3-4 times a week.  Discussed hypoglycemia signs and symptoms as well as management in detail.    Take Levothyroxine on an empty stomach. Take it with a full glass of water at least 30 minutes to 1 hour before eating breakfast.   This medicine should be taken at least 4 hours before or 4 hours after these medicines: antacids (Maalox , Mylanta , Tums ), calcium supplements, cholestyramine (Prevalite , Questran ), colestipol (Colestid ), iron supplements, orlistat (James , Xenical ), simethicone (Gas-X , Mylicon ), and sucralfate (Carafate ).   Swallow the capsule whole. Do not cut or crush it.

## 2021-02-25 NOTE — LETTER
"    2/25/2021         RE: Steve Morgan  [id number]  [facility] [address]        Dear Colleague,    Thank you for referring your patient, Steve Morgan, to the [facility]. Please see a copy of my visit note below.    THIS IS A VIDEO VISIT:    Phone call visit/virtual visit encounter:  New pt to me.  [place] from [facility].    Name of patient: Steve Morgan    Date of encounter: 2/25/2021    Time of start of video visit: 2:38    Video started: 2:47    Video ended: 3:02    Time visit video ended: 3:45    Provider location: working from home/ [facility]    Patient location: patients home.    Mode of transmission: video/ Doximity    Verbal consent: obtained before starting visit. Pt is agreeable.      The patient has been notified of following:      \"This VIDEO visit will be conducted via a call between you and your physician/provider. We have found that certain health care needs can be provided without the need for a physical exam.  This service lets us provide the care you need with a short phone conversation.  If a prescription is necessary we can send it directly to your pharmacy.  If lab work is needed we can place an order for that and you can then stop by our lab to have the test done at a later time.     With new updates with corona virus patient might be billed as clinic visit.     If during the course of the call the physician/provider feels a telephone visit is not appropriate, you will not be charged for this service.\"      Past medical history, social history, family history, allergy and medications were reviewed and updated as appropriate.  Reviewed pertinent labs, notes, imaging studies personally.  Total time spent > 45 min.    Name: Steve \"Caio\" Cathy  F/u for Diabetes (Last seen 3/12/2020).  HPI:  For f/u of DM.    has a past medical history of Cellulitis and abscess of trunk (6/27/2017), Depression, Hyperlipidemia LDL goal <100 (10/31/2010), Hypertension " goal BP (blood pressure) < 140/90 (3/17/2011), Hypothyroidism (1/12/2010), Morbid obesity due to excess calories (H) (1/12/2010), Neuropathy in diabetes (H) (1/12/2010), Obesity (1/12/2010), Sleep apnea (1/12/2010), and Type 2 diabetes, HbA1C goal < 8% (H) (3/8/2011).  Was on nina but no longer wearing often.  Not checking BG with glucometer.  A1c improved to 7.4%. Is more active.  1. Type 2 DM:  Orginally diagnosed at the age of 33 or 38.  Was prediabetic prior, was not particularly symptomatic at the time, was noted on routine lab work    Current Regimen: Metformin 1000 mg BID, Glipizide XL 20 mg/day, Basaglar 32 units at night, Novolog 1:9 + 1:50>150 ( about 40 units/day).      The plan was to start Trulicity but the copay was not affordable ($100 per refill).    yes:     Diabetes Medication(s)     Biguanides       metFORMIN (GLUCOPHAGE) 1000 MG tablet    TAKE 1 TABLET BY MOUTH TWICE A DAY WITH MEALS    Insulin       insulin glargine (BASAGLAR KWIKPEN) 100 UNIT/ML pen    INJECT 30 UNITS UNDER THE SKIN ONCE DAILY. INCREASE AS DIRECTED TO MAX DOSE OF 45 UNITS     NOVOLOG FLEXPEN 100 UNIT/ML soln    Use 1 unit per 9 gms of carb + correction 1:50 above 150 mg/dl. Titrate as directed up to 45 units per day.    Sulfonylureas       glipiZIDE (GLUCOTROL XL) 10 MG 24 hr tablet    TAKE 2 TABLETS BY MOUTH EVERY DAY        BS checks: Was on nina but no longer using that.  Nina Download: Not checking.  Unable to use glucose meter - he has a hard time doing fingerstick testing due to his tremor.    Complications:   Diabetes Complications  Description / Detail    Diabetic Retinopathy  No retinopathy,last exam 2/2020, [facility]   CAD / PAD  No   Neuropathy  Yes + diabetic neuropathy: numbness hands and feet.  Saw podiatry, Dr. Mathis, 1/27/2018- using diabetic shoes   Nephropathy / Microalbuminuria  Yes, elevated urine microalbumin   Gastroparesis  No   Hypoglycemia [place]  Not sure, gets 'kind of dizzy' when blood sugar is  low but reports history of low blood sugars without symptoms     2. Hypertension: Blood Pressure:   BP Readings from Last 3 Encounters:   09/25/20 132/70   05/11/20 132/78   03/12/20 128/82   Blood pressure medications include atenolol 25 mg qd and losartan 50 mg every day.   3. Hyperlipidemia: Takes simvastatin 20 mg for lipid control.       4.  Hypothyroidism. Currently treated with levoxyl (KAMERON) 125 mcg daily. Takes the levoxyl first thing in the morning and waits 30+ minutes before eating breakfast.  PA for Levoxyl approved through 4/7/2021.  PMH/PSH:  Past Medical History:   Diagnosis Date     Cellulitis and abscess of trunk 6/27/2017     Depression      Hyperlipidemia LDL goal <100 10/31/2010     Hypertension goal BP (blood pressure) < 140/90 3/17/2011     Hypothyroidism 1/12/2010     Morbid obesity due to excess calories (H) 1/12/2010     Neuropathy in diabetes (H) 1/12/2010     Obesity 1/12/2010     Sleep apnea 1/12/2010    CPAP     Type 2 diabetes, HbA1C goal < 8% (H) 3/8/2011     Past Surgical History:   Procedure Laterality Date     BIOPSY       COLONOSCOPY       EYE SURGERY       GENITOURINARY SURGERY      surg for undescended testicle     REPAIR HAMMER TOE BILATERAL  5/16/2013    Procedure: REPAIR HAMMER TOE BILATERAL;  Flexor Tenotomy Toes 2,3,4,5 Bilateral Feet;  Surgeon: Saad Bangura DPM;  Location: [facility] OR     Family Hx:  Family History   Problem Relation Age of Onset     Breast Cancer Mother      Hypertension Mother      Thyroid Disease Mother      Depression Mother      Alzheimer Disease Mother 82     Cancer - colorectal Father      Thyroid Disease Father      Depression Father      Other - See Comments Father         bladder polyps     Heart Disease Maternal Grandmother         CHF     Circulatory Paternal Grandmother      Cancer Paternal Grandfather      Diabetes Brother      Diabetes Brother      Asthma Brother      Thyroid disease: Yes: mother         DM2: Yes: one brother with type 1  diabetes and one brother with type 2 diabetes, paternal aunt with DM2         Autoimmune: DM1, SLE, RA, Vitiligo Yes: brother with DM1    Social Hx:  Social History     Socioeconomic History     Marital status:      Spouse name: Sri     Number of children: 2     Years of education: Not on file     Highest education level: Associate degree: occupational, technical, or vocational program   Occupational History     Occupation:      Employer: NONE      Comment: [employer]   Social Needs     Financial resource strain: Not hard at all     Food insecurity     Worry: Never true     Inability: Never true     Transportation needs     Medical: No     Non-medical: No   Tobacco Use     Smoking status: Never Smoker     Smokeless tobacco: Never Used   Substance and Sexual Activity     Alcohol use: Yes     Frequency: Monthly or less     Drinks per session: 1 or 2     Binge frequency: Never     Comment: Occassionally     Drug use: No     Sexual activity: Not Currently     Partners: Female     Birth control/protection: Abstinence   Lifestyle     Physical activity     Days per week: 0 days     Minutes per session: 0 min     Stress: Not at all   Relationships     Social connections     Talks on phone: Twice a week     Gets together: Not on file     Attends Advent service: Never     Active member of club or organization: Yes     Attends meetings of clubs or organizations: 1 to 4 times per year     Relationship status:      Intimate partner violence     Fear of current or ex partner: Not on file     Emotionally abused: Not on file     Physically abused: Not on file     Forced sexual activity: Not on file   Other Topics Concern     Parent/sibling w/ CABG, MI or angioplasty before 65F 55M? No   Social History Narrative     Not on file          MEDICATIONS:  has a current medication list which includes the following prescription(s): ammonium lactate, ammonium lactate, aspirin, atenolol, bupropion, calcium  carb-cholecalciferol, citalopram, cyanocobalamin, ferrous sulfate, finasteride, glipizide, hydrocortisone, insulin glargine, insulin pen needle, levoxyl, losartan, metformin, metformin, multi-vitamin, mupirocin, novolog flexpen, nystatin, omeprazole-sodium bicarbonate, simvastatin, cholecalciferol, freestyle juan 14 day reader, and freestyle juan 14 day sensor.    ROS     ROS: 10 point ROS neg other than the symptoms noted above in the HPI.    PE:  There were no vitals taken for this visit.  GENERAL: healthy, alert and no distress  EYES: Eyes grossly normal to inspection, conjunctivae and sclerae normal  ENT: no nose swelling, nasal discharge.  Thyroid: no apparent thyroid nodules  RESP: no audible wheeze, cough, or visible cyanosis.  No visible retractions or increased work of breathing.  Able to speak fully in complete sentences.  ABDO: not evaluated.  EXTREMITIES: no hand tremors.  NEURO: Cranial nerves grossly intact, mentation intact and speech normal  SKIN: No apparent skin lesions, rash or edema seen   PSYCH: mentation appears normal, affect normal/bright, judgement and insight intact, normal speech and appearance well-groomed    LABS:    Component      Latest Ref Rng & Units 8/29/2019   Glucose 316   C-Peptide      0.9 - 6.9 ng/mL 2.5     A1c:  Lab Results   Component Value Date    A1C 7.4 02/22/2021    A1C 9.9 03/12/2020    A1C 11.7 12/05/2019    A1C 11.6 08/29/2019    A1C 11.8 12/14/2018       Basic Metabolic Panel:  Creatinine   Date Value Ref Range Status   01/16/2020 1.01 0.66 - 1.25 mg/dL Final     Urine microabumin:  Lab Results   Component Value Date    UMALCR 81.46 03/05/2020      LFTS/Cholesterol Panel:  Recent Labs   Lab Test 03/05/20  0857 01/16/20  0942 05/21/15  1104 05/21/15  1104 04/19/14  0802   CHOL 139 154   < > 137 122   HDL 33* 32*   < > 30* 23*   LDL 88 97   < > 78 71   TRIG 89 126   < > 144 141   CHOLHDLRATIO  --   --   --  4.6 5.3*    < > = values in this interval not displayed.        Thyroid Function:   !THYROID Latest Ref Rng & Units 3/5/2020 12/5/2019 12/14/2018   TSH 0.40 - 4.00 mU/L 0.55 5.20 (H) 2.19   T4 FREE 0.76 - 1.46 ng/dL  1.19      Component    Latest Ref Rng & Units 10/19/2018   Thyroid Peroxidase Antibody    <35 IU/mL 11   Thyroglobulin Antibody    <40 IU/mL <20   Thyroid Stim Immunog    <=1.3 TSI index <1.0     All pertinent notes, labs, and images personally reviewed by me.     A/P  Mr.Walter Morgan is a 60 year old evaluated via phone visit for the management of:    1. DM2 - Under good control. A1c 7.2%.  Diabetes is complicated by neuropathy and nephropathy/elevated urine microalbumin.  He has challenges with blood sugar testing due to limited use of his hands (neuropathy + tremor - both significant) and increasing difficulty with ADL's   Can't test his blood sugars using a meter due to the above.   He also has an extremely hard time inserting the Nina sensors due to neuropathy and tremor.    Continue Metformin regular 1000 mg BID  Continue Levemir 32 units once daily  Continue Novolog insulin to carb ratio to 1:9 and continue current correction 1:50>150.    Start using freestyle liber.  Scan often.  Follow up with TOSHA Garcia in 6 weeks if BG are high.  Labs and follow up with endocrinology in 3 monhts.  Please make a lab appointment for blood work and follow up clinic appointment in 1 week after that to discuss results.    2. Hypothyroidism.  Currently treated with Levoxyl (KAMERON) 125 mcg/day.  Clinically and biochemically euthyroid. Continue Levoxyl 125 mcg/day.   Labs in 3 months.    Discussed s/s of hypothyroidism and hyperthyroidism to watch for.  The patient indicates understanding of these issues and agrees with the plan.    3. Hyperlipidemia - On statin therapy.    4. Prevention:  Opthalmology-Yes: annually  Dental-Yes: twice a year  ASA-Yes, 81 mg qd   Smoking- No    Most Recent Immunizations   Administered Date(s) Administered     DTaP, Unspecified  11/08/2010     Flu, Unspecified 11/15/2016     [id number] Novel Flu 11/04/2009     HepA-Adult 04/17/2019     Influenza (H1N1) 11/04/2009     Influenza (IIV3) PF 10/01/2012     Influenza Quad, Recombinant, p-free (RIV4) 11/04/2020     Influenza Vaccine IM > 6 months Valent IIV4 09/07/2017     Influenza Vaccine, 6+MO IM (QUADRIVALENT W/PRESERVATIVES) 11/15/2016     MMR 03/21/1995     Pneumococcal 23 valent 10/05/2012     TD (ADULT, 7+) 03/21/1995     TDAP Vaccine (Adacel) 11/08/2010     Typhoid IM 10/16/2018     Zoster vaccine recombinant adjuvanted (SHINGRIX) 12/03/2020       Follow-up:  3 months.    Cindi Meraz MD  [facility]  February 25, 2021  CC: Lisa Pierre            Again, thank you for allowing me to participate in the care of your patient.        Sincerely,        Cindi Meraz MD

## 2021-02-25 NOTE — PROGRESS NOTES
"THIS IS A VIDEO VISIT:    Phone call visit/virtual visit encounter:  New pt to me.  AUSTIN from [facility].    Name of patient: Steve Morgan    Date of encounter: 2/25/2021    Time of start of video visit: 2:38    Video started: 2:47    Video ended: 3:02    Time visit video ended: 3:45    Provider location: working from home/ [facility]    Patient location: patients home.    Mode of transmission: video/ Doximity    Verbal consent: obtained before starting visit. Pt is agreeable.      The patient has been notified of following:      \"This VIDEO visit will be conducted via a call between you and your physician/provider. We have found that certain health care needs can be provided without the need for a physical exam.  This service lets us provide the care you need with a short phone conversation.  If a prescription is necessary we can send it directly to your pharmacy.  If lab work is needed we can place an order for that and you can then stop by our lab to have the test done at a later time.     With new updates with corona virus patient might be billed as clinic visit.     If during the course of the call the physician/provider feels a telephone visit is not appropriate, you will not be charged for this service.\"      Past medical history, social history, family history, allergy and medications were reviewed and updated as appropriate.  Reviewed pertinent labs, notes, imaging studies personally.  Total time spent > 45 min.    Name: Steve \"Caio\" Cathy  F/u for Diabetes (Last seen 3/12/2020).  HPI:  For f/u of DM.    has a past medical history of Cellulitis and abscess of trunk (6/27/2017), Depression, Hyperlipidemia LDL goal <100 (10/31/2010), Hypertension goal BP (blood pressure) < 140/90 (3/17/2011), Hypothyroidism (1/12/2010), Morbid obesity due to excess calories (H) (1/12/2010), Neuropathy in diabetes (H) (1/12/2010), Obesity (1/12/2010), Sleep apnea (1/12/2010), and Type 2 diabetes, HbA1C goal < 8% (H) " (3/8/2011).  Was on nina but no longer wearing often.  Not checking BG with glucometer.  A1c improved to 7.4%. Is more active.  1. Type 2 DM:  Orginally diagnosed at the age of 33 or 38.  Was prediabetic prior, was not particularly symptomatic at the time, was noted on routine lab work    Current Regimen: Metformin 1000 mg BID, Glipizide XL 20 mg/day, Basaglar 32 units at night, Novolog 1:9 + 1:50>150 ( about 40 units/day).      The plan was to start Trulicity but the copay was not affordable ($100 per refill).    yes:     Diabetes Medication(s)     Biguanides       metFORMIN (GLUCOPHAGE) 1000 MG tablet    TAKE 1 TABLET BY MOUTH TWICE A DAY WITH MEALS    Insulin       insulin glargine (BASAGLAR KWIKPEN) 100 UNIT/ML pen    INJECT 30 UNITS UNDER THE SKIN ONCE DAILY. INCREASE AS DIRECTED TO MAX DOSE OF 45 UNITS     NOVOLOG FLEXPEN 100 UNIT/ML soln    Use 1 unit per 9 gms of carb + correction 1:50 above 150 mg/dl. Titrate as directed up to 45 units per day.    Sulfonylureas       glipiZIDE (GLUCOTROL XL) 10 MG 24 hr tablet    TAKE 2 TABLETS BY MOUTH EVERY DAY        BS checks: Was on nina but no longer using that.  Nina Download: Not checking.  Unable to use glucose meter - he has a hard time doing fingerstick testing due to his tremor.    Complications:   Diabetes Complications  Description / Detail    Diabetic Retinopathy  No retinopathy,last exam 2/2020, [facility]   CAD / PAD  No   Neuropathy  Yes + diabetic neuropathy: numbness hands and feet.  Saw podiatry, Dr. Mathis, 1/27/2018- using diabetic shoes   Nephropathy / Microalbuminuria  Yes, elevated urine microalbumin   Gastroparesis  No   Hypoglycemia [place]  Not sure, gets 'kind of dizzy' when blood sugar is low but reports history of low blood sugars without symptoms     2. Hypertension: Blood Pressure:   BP Readings from Last 3 Encounters:   09/25/20 132/70   05/11/20 132/78   03/12/20 128/82   Blood pressure medications include atenolol 25 mg qd and  losartan 50 mg every day.   3. Hyperlipidemia: Takes simvastatin 20 mg for lipid control.       4.  Hypothyroidism. Currently treated with levoxyl (KAMERON) 125 mcg daily. Takes the levoxyl first thing in the morning and waits 30+ minutes before eating breakfast.  PA for Levoxyl approved through 4/7/2021.  PMH/PSH:  Past Medical History:   Diagnosis Date     Cellulitis and abscess of trunk 6/27/2017     Depression      Hyperlipidemia LDL goal <100 10/31/2010     Hypertension goal BP (blood pressure) < 140/90 3/17/2011     Hypothyroidism 1/12/2010     Morbid obesity due to excess calories (H) 1/12/2010     Neuropathy in diabetes (H) 1/12/2010     Obesity 1/12/2010     Sleep apnea 1/12/2010    CPAP     Type 2 diabetes, HbA1C goal < 8% (H) 3/8/2011     Past Surgical History:   Procedure Laterality Date     BIOPSY       COLONOSCOPY       EYE SURGERY       GENITOURINARY SURGERY      surg for undescended testicle     REPAIR HAMMER TOE BILATERAL  5/16/2013    Procedure: REPAIR HAMMER TOE BILATERAL;  Flexor Tenotomy Toes 2,3,4,5 Bilateral Feet;  Surgeon: Saad Bangura DPM;  Location: [facility] OR     Family Hx:  Family History   Problem Relation Age of Onset     Breast Cancer Mother      Hypertension Mother      Thyroid Disease Mother      Depression Mother      Alzheimer Disease Mother 82     Cancer - colorectal Father      Thyroid Disease Father      Depression Father      Other - See Comments Father         bladder polyps     Heart Disease Maternal Grandmother         CHF     Circulatory Paternal Grandmother      Cancer Paternal Grandfather      Diabetes Brother      Diabetes Brother      Asthma Brother      Thyroid disease: Yes: mother         DM2: Yes: one brother with type 1 diabetes and one brother with type 2 diabetes, paternal aunt with DM2         Autoimmune: DM1, SLE, RA, Vitiligo Yes: brother with DM1    Social Hx:  Social History     Socioeconomic History     Marital status:      Spouse name: Sri Solitario  of children: 2     Years of education: Not on file     Highest education level: Associate degree: occupational, technical, or vocational program   Occupational History     Occupation:      Employer: NONE      Comment: [employer]   Social Needs     Financial resource strain: Not hard at all     Food insecurity     Worry: Never true     Inability: Never true     Transportation needs     Medical: No     Non-medical: No   Tobacco Use     Smoking status: Never Smoker     Smokeless tobacco: Never Used   Substance and Sexual Activity     Alcohol use: Yes     Frequency: Monthly or less     Drinks per session: 1 or 2     Binge frequency: Never     Comment: Occassionally     Drug use: No     Sexual activity: Not Currently     Partners: Female     Birth control/protection: Abstinence   Lifestyle     Physical activity     Days per week: 0 days     Minutes per session: 0 min     Stress: Not at all   Relationships     Social connections     Talks on phone: Twice a week     Gets together: Not on file     Attends Orthodox service: Never     Active member of club or organization: Yes     Attends meetings of clubs or organizations: 1 to 4 times per year     Relationship status:      Intimate partner violence     Fear of current or ex partner: Not on file     Emotionally abused: Not on file     Physically abused: Not on file     Forced sexual activity: Not on file   Other Topics Concern     Parent/sibling w/ CABG, MI or angioplasty before 65F 55M? No   Social History Narrative     Not on file          MEDICATIONS:  has a current medication list which includes the following prescription(s): ammonium lactate, ammonium lactate, aspirin, atenolol, bupropion, calcium carb-cholecalciferol, citalopram, cyanocobalamin, ferrous sulfate, finasteride, glipizide, hydrocortisone, insulin glargine, insulin pen needle, levoxyl, losartan, metformin, metformin, multi-vitamin, mupirocin, novolog flexpen, nystatin, omeprazole-sodium  bicarbonate, simvastatin, cholecalciferol, freestyle juan 14 day reader, and freestyle juan 14 day sensor.    ROS     ROS: 10 point ROS neg other than the symptoms noted above in the HPI.    PE:  There were no vitals taken for this visit.  GENERAL: healthy, alert and no distress  EYES: Eyes grossly normal to inspection, conjunctivae and sclerae normal  ENT: no nose swelling, nasal discharge.  Thyroid: no apparent thyroid nodules  RESP: no audible wheeze, cough, or visible cyanosis.  No visible retractions or increased work of breathing.  Able to speak fully in complete sentences.  ABDO: not evaluated.  EXTREMITIES: no hand tremors.  NEURO: Cranial nerves grossly intact, mentation intact and speech normal  SKIN: No apparent skin lesions, rash or edema seen   PSYCH: mentation appears normal, affect normal/bright, judgement and insight intact, normal speech and appearance well-groomed    LABS:    Component      Latest Ref Rng & Units 8/29/2019   Glucose 316   C-Peptide      0.9 - 6.9 ng/mL 2.5     A1c:  Lab Results   Component Value Date    A1C 7.4 02/22/2021    A1C 9.9 03/12/2020    A1C 11.7 12/05/2019    A1C 11.6 08/29/2019    A1C 11.8 12/14/2018       Basic Metabolic Panel:  Creatinine   Date Value Ref Range Status   01/16/2020 1.01 0.66 - 1.25 mg/dL Final     Urine microabumin:  Lab Results   Component Value Date    UMALCR 81.46 03/05/2020      LFTS/Cholesterol Panel:  Recent Labs   Lab Test 03/05/20  0857 01/16/20  0942 05/21/15  1104 05/21/15  1104 04/19/14  0802   CHOL 139 154   < > 137 122   HDL 33* 32*   < > 30* 23*   LDL 88 97   < > 78 71   TRIG 89 126   < > 144 141   CHOLHDLRATIO  --   --   --  4.6 5.3*    < > = values in this interval not displayed.       Thyroid Function:   !THYROID Latest Ref Rng & Units 3/5/2020 12/5/2019 12/14/2018   TSH 0.40 - 4.00 mU/L 0.55 5.20 (H) 2.19   T4 FREE 0.76 - 1.46 ng/dL  1.19      Component    Latest Ref Rng & Units 10/19/2018   Thyroid Peroxidase Antibody    <35 IU/mL  11   Thyroglobulin Antibody    <40 IU/mL <20   Thyroid Stim Immunog    <=1.3 TSI index <1.0     All pertinent notes, labs, and images personally reviewed by me.     A/P  Mr.Walter Morgan is a 60 year old evaluated via phone visit for the management of:    1. DM2 - Under good control. A1c 7.2%.  Diabetes is complicated by neuropathy and nephropathy/elevated urine microalbumin.  He has challenges with blood sugar testing due to limited use of his hands (neuropathy + tremor - both significant) and increasing difficulty with ADL's   Can't test his blood sugars using a meter due to the above.   He also has an extremely hard time inserting the Nina sensors due to neuropathy and tremor.    Continue Metformin regular 1000 mg BID  Continue Levemir 32 units once daily  Continue Novolog insulin to carb ratio to 1:9 and continue current correction 1:50>150.    Start using freestyle liber.  Scan often.  Follow up with CDE Lucía Garcia in 6 weeks if BG are high.  Labs and follow up with endocrinology in 3 monhts.  Please make a lab appointment for blood work and follow up clinic appointment in 1 week after that to discuss results.    2. Hypothyroidism.  Currently treated with Levoxyl (KAMERON) 125 mcg/day.  Clinically and biochemically euthyroid. Continue Levoxyl 125 mcg/day.   Labs in 3 months.    Discussed s/s of hypothyroidism and hyperthyroidism to watch for.  The patient indicates understanding of these issues and agrees with the plan.    3. Hyperlipidemia - On statin therapy.    4. Prevention:  Opthalmology-Yes: annually  Dental-Yes: twice a year  ASA-Yes, 81 mg qd   Smoking- No    Most Recent Immunizations   Administered Date(s) Administered     DTaP, Unspecified 11/08/2010     Flu, Unspecified 11/15/2016     O1c5-09 Novel Flu 11/04/2009     HepA-Adult 04/17/2019     Influenza (H1N1) 11/04/2009     Influenza (IIV3) PF 10/01/2012     Influenza Quad, Recombinant, p-free (RIV4) 11/04/2020     Influenza Vaccine IM > 6 months  Valent IIV4 09/07/2017     Influenza Vaccine, 6+MO IM (QUADRIVALENT W/PRESERVATIVES) 11/15/2016     MMR 03/21/1995     Pneumococcal 23 valent 10/05/2012     TD (ADULT, 7+) 03/21/1995     TDAP Vaccine (Adacel) 11/08/2010     Typhoid IM 10/16/2018     Zoster vaccine recombinant adjuvanted (SHINGRIX) 12/03/2020       Follow-up:  3 months.    Cindi Meraz MD  [facility]  February 25, 2021  CC: Lisa Pierre

## 2021-02-26 ENCOUNTER — TELEPHONE (OUTPATIENT)
Dept: ENDOCRINOLOGY | Facility: CLINIC | Age: 61
End: 2021-02-26

## 2021-02-26 NOTE — TELEPHONE ENCOUNTER
Diabetes Education Scheduling Outreach #1:    Call to patient to schedule. Left message with phone number to call to schedule.    Plan for 2nd outreach attempt within 1 week.    Colton Anthony OnCall  Diabetes and Nutrition Scheduling

## 2021-03-03 ENCOUNTER — VIRTUAL VISIT (OUTPATIENT)
Dept: PSYCHOLOGY | Facility: CLINIC | Age: 61
End: 2021-03-03
Payer: MEDICARE

## 2021-03-03 DIAGNOSIS — F33.0 MILD EPISODE OF RECURRENT MAJOR DEPRESSIVE DISORDER (H): Primary | ICD-10-CM

## 2021-03-03 PROCEDURE — 90834 PSYTX W PT 45 MINUTES: CPT | Mod: 95 | Performed by: SOCIAL WORKER

## 2021-03-03 ASSESSMENT — PATIENT HEALTH QUESTIONNAIRE - PHQ9: SUM OF ALL RESPONSES TO PHQ QUESTIONS 1-9: 12

## 2021-03-03 NOTE — PROGRESS NOTES
Progress Note    Patient Name: Steve Morgan  Date: 3/3/2021         Service Type: Individual      Session Start Time: 15:30  Session End Time: 16:22     Session Length: 52    Session #: 13    Attendees: Client attended alone    Service Modality:  Video Visit:    Telemedicine Visit: The patient's condition can be safely assessed and treated via synchronous audio and visual telemedicine encounter.      Reason for Telemedicine Visit: Services only offered telehealth    Originating Site (Patient Location): Patient's home    Distant Site (Provider Location): Provider Remote Setting    Consent:  The patient/guardian has verbally consented to: the potential risks and benefits of telemedicine (video visit) versus in person care; bill my insurance or make self-payment for services provided; and responsibility for payment of non-covered services.     Patient would like the video invitation sent by: Send to e-mail at: [email or web address]    Mode of Communication:  Video Conference via Amwell    As the provider I attest to compliance with applicable laws and regulations related to telemedicine.     Treatment Plan Last Reviewed: 2/3/2021  PHQ-9 / JOSE MANUEL-7 : 12 - 3/3/2021    DATA  Interactive Complexity: No  Crisis: No       Progress Since Last Session (Related to Symptoms / Goals / Homework):   Symptoms: Feeling good about self, motivation continues to be an issue.    Homework: Did not complete       Episode of Care Goals: Achieved / completed to satisfaction - PREPARATION (Decided to change - considering how); Intervened by negotiating a change plan and determining options / strategies for behavior change, identifying triggers, exploring social supports, and working towards setting a date to begin behavior change  Satisfactory progress - ACTION (Actively working towards change); Intervened by reinforcing change plan / affirming steps taken     Current / Ongoing Stressors and  "Concerns:   Patient reports stressors of getting angry while driving and that this has caused him and his wife to experience a frightening situation with another .  Patient reports that he becomes mad with the people that he sees most in his life, his parents, his son, his wife and his cat.  He also notes another concern that causes stress in this area of his life is his procrastination and that he spends most of his day \"on the couch\".       Treatment Objective(s) Addressed in This Session:   Increase interest, engagement, and pleasure in doing things     Intervention:   CBT: Discussed successes that patient has had with increased motivation, patient has noted that some things he has stopping doing and is interested in going for walk now that the weather is improving.  ACT: Discussed patient continuing to note his values as an area he would like to continue.  Patient stated that going for walks would be important area to start with.  He stated that he finds himself telling himself he will do it later and then feeling bad about himself.  Patient agreed that this is part of his value of health.  He was open to mindfully noting his emotion and was open to discussing his frustration and making room for it.  Discussed how fighting our emotions makes us feel worse and patient reported he could feel this in the exercise.       ASSESSMENT: Current Emotional / Mental Status (status of significant symptoms):   Risk status (Self / Other harm or suicidal ideation)   Patient denies current fears or concerns for personal safety.   Patient denies current or recent suicidal ideation or behaviors.   Patient denies current or recent homicidal ideation or behaviors.   Patient denies current or recent self injurious behavior or ideation.   Patient denies other safety concerns.   Patient reports there has been no change in risk factors since their last session.     Patient reports there has been no change in protective factors " since their last session.     Recommended that patient call 911 or go to the local ED should there be a change in any of these risk factors.     Appearance:   Appropriate    Eye Contact:   Good    Psychomotor Behavior: Normal    Attitude:   Cooperative  Interested Friendly   Orientation:   All   Speech    Rate / Production: Normal/ Responsive    Volume:  Loud    Mood:    Normal   Affect:    Appropriate    Thought Content:  Clear  Rumination    Thought Form:  Coherent  Logical    Insight:    Good  and Fair      Medication Review:   No changes to current psychiatric medication(s)     Medication Compliance:   Yes     Changes in Health Issues:   None reported     Chemical Use Review:   Substance Use: Chemical use reviewed, no active concerns identified      Tobacco Use: No current tobacco use.      Diagnosis:  1. Mild episode of recurrent major depressive disorder (H)        Collateral Reports Completed:   Not Applicable    PLAN: (Patient Tasks / Therapist Tasks / Other)  Patient will start preparing for leaving the house with his diabetic supplies, he will start walking and will begin to note success in living his values by taking time to note his feelings when he begins to want to put things off.        Veronica Chavis, SAURABH                                                         ______________________________________________________________________    Treatment Plan    Patient's Name: Steve Morgan  YOB: 1960    Date: 2/3/2021    DSM5 Diagnoses: 296.32 (F33.1) Major Depressive Disorder, Recurrent Episode, Moderate With melancholic features  Psychosocial / Contextual Factors: Individual Factors Patient reports that he has family friend's but otherwise is socially isolated, Community Factors Patient reports that he was volunteering, but has been unable to due to finanical reasons and Health- Seeking Factors Patient reports that neuropathy from diabetes has made it so that he cannot work and  creates other difficulties for him.    WHODAS: 20    Referral / Collaboration:  Referral to another professional/service is not indicated at this time..    Anticipated number of session or this episode of care: 30-40      MeasurableTreatment Goal(s) related to diagnosis / functional impairment(s)  Goal 1: Patient will manage my anger when I am driving    I will know I've met my goal when I am a calmer drive and would be able to let go of a situation that I don't agree with.      Objective #A (Patient Action)    Patient will use progressive relaxation exercise once in the morning and once in the evening to help relieve tension  practice deep diaphragm breathing once daily for at least 5 minutes to reduce anger / irritability and regain a calmer, more clear state of mind.  Status: Completed - Date: 2/3/2021     Intervention(s)  Therapist will assign homework to engage in progressive musle relaxation or breathing depending on patient preference  teach teach breathing or progressive muscle relaxation.    Objective #B  Patient will identify patterns of escalation  (i.e. tightness in chest, flushed face, increased heart rate, clenched hands, etc.).  Status: Completed - Date: 2/3/2021     Intervention(s)  Therapist will assign homework to engage in using skills to help with reducing anger  role-play using skills in the moment  teach cognitive distortions.    Objective #C  Patient will practice one mindfulness skill each day for 5 minutes.  Status: Continued - Date(s): 2/3/2021     Intervention(s)  Therapist will assign homework practice mindfulness while driving  teach mindfulness.      Goal 2: Patient will be able to have better communication with his wife    I will know I've met my goal when I am not raising my voice when I am frustrated.      Objective #A (Patient Action)    Status: Continued - Date(s): 2/3/2021     Patient will learn & utilize at least 1 assertive communication skills  weekly.    Intervention(s)  Therapist will assign homework to engage in assertive communication skills  role-play assertiveness skills.    Objective #B  Patient will Identify negative self-talk and behaviors: challenge core beliefs, myths, and actions.    Status: Continued - Date(s): 2/3/2021     Intervention(s)  Therapist will assign homework to engage in cognitive restructuring  teach emotional recognition/identification. and thoughts connected to these emotions.    Objective #C  Patient will identify at least 3 alternative response(s) to aggressive behaviors.  Status: Continued - Date(s): 2/3/2021     Intervention(s)  Therapist will assign homework to engage in skills that can reduce vulnerability  provide support to engage in coping skills when feeling frustrated.      Goal 3: Patient will not procrastinate    I will know I've met my goal when I am up and doing things and when I see something that needs to be done I do it (cleaning litter boxes, taking out the garbage and taking care of mail ).      Objective #A (Patient Action)    Status: Completed - Date: 2/3/2021     Patient will identify 3-5 fears / thoughts that contribute to feeling anxious.    Intervention(s)  Therapist will assign homework to pay attention to thoughts and feelings when procrastinating  provide education on the connection of thoughts and feelings related to procrastination.    Objective #B  Patient will Increase interest, engagement, and pleasure in doing things.    Status: Completed - Date: 2/3/2021     Intervention(s)  Therapist will assign homework to engage in a daily schedule  teach how to improve motivation.    Objective #C  Patient will Feel less tired and more energy during the day .  Status: Continued - Date(s): 2/3/2021     Intervention(s)  Therapist will assign homework to engage in activity to increase motivation by gaging amount of energy activities take  teach improving motivation skills by gaging energy level of  activities.      Patient has reviewed and agreed to the above plan.      Veronica Chavis, [facility]  February 3, 2021

## 2021-03-19 ENCOUNTER — VIRTUAL VISIT (OUTPATIENT)
Dept: PSYCHOLOGY | Facility: CLINIC | Age: 61
End: 2021-03-19
Payer: MEDICARE

## 2021-03-19 DIAGNOSIS — F33.1 MODERATE EPISODE OF RECURRENT MAJOR DEPRESSIVE DISORDER (H): Primary | ICD-10-CM

## 2021-03-19 PROCEDURE — 90834 PSYTX W PT 45 MINUTES: CPT | Mod: 95 | Performed by: SOCIAL WORKER

## 2021-03-19 ASSESSMENT — PATIENT HEALTH QUESTIONNAIRE - PHQ9: SUM OF ALL RESPONSES TO PHQ QUESTIONS 1-9: 17

## 2021-03-19 NOTE — PROGRESS NOTES
Progress Note    Patient Name: Steve Morgan  Date: 3/19/2021         Service Type: Individual      Session Start Time: 14:00  Session End Time: 14:52     Session Length: 52    Session #: 14    Attendees: Client attended alone    Service Modality:  Video Visit:    Telemedicine Visit: The patient's condition can be safely assessed and treated via synchronous audio and visual telemedicine encounter.      Reason for Telemedicine Visit: Services only offered telehealth    Originating Site (Patient Location): Patient's home    Distant Site (Provider Location): Provider Remote Setting    Consent:  The patient/guardian has verbally consented to: the potential risks and benefits of telemedicine (video visit) versus in person care; bill my insurance or make self-payment for services provided; and responsibility for payment of non-covered services.     Patient would like the video invitation sent by: Send to e-mail at: [email or web address]    Mode of Communication:  Video Conference via Amwell    As the provider I attest to compliance with applicable laws and regulations related to telemedicine.     Treatment Plan Last Reviewed: 2/3/2021  PHQ-9 / JOSE MANUEL-7 : 17 - 3/19/2021    DATA  Interactive Complexity: No  Crisis: No       Progress Since Last Session (Related to Symptoms / Goals / Homework):   Symptoms: Worsening reports feeling more angry    Homework: Partially completed       Episode of Care Goals: Achieved / completed to satisfaction - PREPARATION (Decided to change - considering how); Intervened by negotiating a change plan and determining options / strategies for behavior change, identifying triggers, exploring social supports, and working towards setting a date to begin behavior change  Satisfactory progress - ACTION (Actively working towards change); Intervened by reinforcing change plan / affirming steps taken     Current / Ongoing Stressors and Concerns:   Patient reports  "stressors of getting angry while driving and that this has caused him and his wife to experience a frightening situation with another .  Patient reports that he becomes mad with the people that he sees most in his life, his parents, his son, his wife and his cat.  He also notes another concern that causes stress in this area of his life is his procrastination and that he spends most of his day \"on the couch\".       Treatment Objective(s) Addressed in This Session:   Increase interest, engagement, and pleasure in doing things     Intervention:   CBT: Patient has been able to see that the level of energy that doing something takes is actually less than the messages his mind is telling him.  He reported forgetting to make sign to remember to take diabetic supplies with him and made and put up sign in session.  Patient also agreed he could go for walk when his wife does to help him build his routine.  ACT: Worked with patient to mindfully note the feeling of anger in his body and support patient to note the connection of his anger to his love for his cat.       ASSESSMENT: Current Emotional / Mental Status (status of significant symptoms):   Risk status (Self / Other harm or suicidal ideation)   Patient denies current fears or concerns for personal safety.   Patient denies current or recent suicidal ideation or behaviors.   Patient denies current or recent homicidal ideation or behaviors.   Patient denies current or recent self injurious behavior or ideation.   Patient denies other safety concerns.   Patient reports there has been no change in risk factors since their last session.     Patient reports there has been no change in protective factors since their last session.     Recommended that patient call 911 or go to the local ED should there be a change in any of these risk factors.     Appearance:   Appropriate    Eye Contact:   Good    Psychomotor Behavior: Normal    Attitude:   Cooperative  Interested " Friendly   Orientation:   All   Speech    Rate / Production: Normal/ Responsive    Volume:  Loud    Mood:    Irritable    Affect:    Appropriate    Thought Content:  Clear  Rumination    Thought Form:  Coherent  Logical    Insight:    Good  and Fair      Medication Review:   No changes to current psychiatric medication(s)     Medication Compliance:   Yes     Changes in Health Issues:   None reported     Chemical Use Review:   Substance Use: Chemical use reviewed, no active concerns identified      Tobacco Use: No current tobacco use.      Diagnosis:  1. Moderate episode of recurrent major depressive disorder (H)        Collateral Reports Completed:   Not Applicable    PLAN: (Patient Tasks / Therapist Tasks / Other)  Patient will work on using a sign to remember to take diabetic supplies with him, he will make room for his anger and he will go for walks         Veronica YousifMonterey, [facility]                                                         ______________________________________________________________________    Treatment Plan    Patient's Name: Steve Morgan  YOB: 1960    Date: 2/3/2021    DSM5 Diagnoses: 296.32 (F33.1) Major Depressive Disorder, Recurrent Episode, Moderate With melancholic features  Psychosocial / Contextual Factors: Individual Factors Patient reports that he has family friend's but otherwise is socially isolated, Community Factors Patient reports that he was volunteering, but has been unable to due to finanical reasons and Health- Seeking Factors Patient reports that neuropathy from diabetes has made it so that he cannot work and creates other difficulties for him.    WHODAS: 20    Referral / Collaboration:  Referral to another professional/service is not indicated at this time..    Anticipated number of session or this episode of care: 30-40      MeasurableTreatment Goal(s) related to diagnosis / functional impairment(s)  Goal 1: Patient will manage my anger when I am  driving    I will know I've met my goal when I am a calmer drive and would be able to let go of a situation that I don't agree with.      Objective #A (Patient Action)    Patient will use progressive relaxation exercise once in the morning and once in the evening to help relieve tension  practice deep diaphragm breathing once daily for at least 5 minutes to reduce anger / irritability and regain a calmer, more clear state of mind.  Status: Completed - Date: 2/3/2021     Intervention(s)  Therapist will assign homework to engage in progressive musle relaxation or breathing depending on patient preference  teach teach breathing or progressive muscle relaxation.    Objective #B  Patient will identify patterns of escalation  (i.e. tightness in chest, flushed face, increased heart rate, clenched hands, etc.).  Status: Completed - Date: 2/3/2021     Intervention(s)  Therapist will assign homework to engage in using skills to help with reducing anger  role-play using skills in the moment  teach cognitive distortions.    Objective #C  Patient will practice one mindfulness skill each day for 5 minutes.  Status: Continued - Date(s): 2/3/2021     Intervention(s)  Therapist will assign homework practice mindfulness while driving  teach mindfulness.      Goal 2: Patient will be able to have better communication with his wife    I will know I've met my goal when I am not raising my voice when I am frustrated.      Objective #A (Patient Action)    Status: Continued - Date(s): 2/3/2021     Patient will learn & utilize at least 1 assertive communication skills weekly.    Intervention(s)  Therapist will assign homework to engage in assertive communication skills  role-play assertiveness skills.    Objective #B  Patient will Identify negative self-talk and behaviors: challenge core beliefs, myths, and actions.    Status: Continued - Date(s): 2/3/2021     Intervention(s)  Therapist will assign homework to engage in cognitive  restructuring  teach emotional recognition/identification. and thoughts connected to these emotions.    Objective #C  Patient will identify at least 3 alternative response(s) to aggressive behaviors.  Status: Continued - Date(s): 2/3/2021     Intervention(s)  Therapist will assign homework to engage in skills that can reduce vulnerability  provide support to engage in coping skills when feeling frustrated.      Goal 3: Patient will not procrastinate    I will know I've met my goal when I am up and doing things and when I see something that needs to be done I do it (cleaning litter boxes, taking out the garbage and taking care of mail ).      Objective #A (Patient Action)    Status: Completed - Date: 2/3/2021     Patient will identify 3-5 fears / thoughts that contribute to feeling anxious.    Intervention(s)  Therapist will assign homework to pay attention to thoughts and feelings when procrastinating  provide education on the connection of thoughts and feelings related to procrastination.    Objective #B  Patient will Increase interest, engagement, and pleasure in doing things.    Status: Completed - Date: 2/3/2021     Intervention(s)  Therapist will assign homework to engage in a daily schedule  teach how to improve motivation.    Objective #C  Patient will Feel less tired and more energy during the day .  Status: Continued - Date(s): 2/3/2021     Intervention(s)  Therapist will assign homework to engage in activity to increase motivation by gaging amount of energy activities take  teach improving motivation skills by gaging energy level of activities.      Patient has reviewed and agreed to the above plan.      Veronica Chavis, [facility]  February 3, 2021

## 2021-03-25 ENCOUNTER — IMMUNIZATION (OUTPATIENT)
Dept: NURSING | Facility: CLINIC | Age: 61
End: 2021-03-25
Payer: MEDICARE

## 2021-03-25 PROCEDURE — 0031A PR COVID VAC JANSSEN AD26 0.5ML: CPT

## 2021-03-25 PROCEDURE — 91303 PR COVID VAC JANSSEN AD26 0.5ML: CPT

## 2021-04-08 ENCOUNTER — OFFICE VISIT (OUTPATIENT)
Dept: PODIATRY | Facility: CLINIC | Age: 61
End: 2021-04-08
Payer: MEDICARE

## 2021-04-08 VITALS
SYSTOLIC BLOOD PRESSURE: 140 MMHG | HEIGHT: 66 IN | DIASTOLIC BLOOD PRESSURE: 82 MMHG | BODY MASS INDEX: 39.86 KG/M2 | WEIGHT: 248 LBS

## 2021-04-08 DIAGNOSIS — E11.621 DIABETIC ULCER OF LEFT FOOT ASSOCIATED WITH TYPE 2 DIABETES MELLITUS, LIMITED TO BREAKDOWN OF SKIN, UNSPECIFIED PART OF FOOT (H): ICD-10-CM

## 2021-04-08 DIAGNOSIS — L84 PRE-ULCERATIVE CALLUSES: ICD-10-CM

## 2021-04-08 DIAGNOSIS — E11.42 DIABETIC POLYNEUROPATHY ASSOCIATED WITH TYPE 2 DIABETES MELLITUS (H): Primary | ICD-10-CM

## 2021-04-08 DIAGNOSIS — L97.521 DIABETIC ULCER OF LEFT FOOT ASSOCIATED WITH TYPE 2 DIABETES MELLITUS, LIMITED TO BREAKDOWN OF SKIN, UNSPECIFIED PART OF FOOT (H): ICD-10-CM

## 2021-04-08 PROCEDURE — 99213 OFFICE O/P EST LOW 20 MIN: CPT | Performed by: PODIATRIST

## 2021-04-08 ASSESSMENT — MIFFLIN-ST. JEOR: SCORE: 1862.67

## 2021-04-08 NOTE — PROGRESS NOTES
"Foot & Ankle Surgery   April 8, 2021    S:  Pt is seen today for evaluation of fissures bilateral lower extremity 1st MPJ. He was seen Sept 2020 for the R foot, but this has now progressed to the left foot.  Diabetic, very limited sensation in his feet, he has diabetic shoes but no orthotics.  Ambulates without shoes regularly at home.    Vitals:    04/08/21 0849 04/08/21 0851   BP:  (!) 140/82   Weight:  112.5 kg (248 lb)   Height: 1.676 m (5' 6\") 1.676 m (5' 6\")   '      ROS - Pos for CC.  Patient denies current nausea, vomiting, chills, fevers, belly pain, calf pain, chest pain or SOB.  Complete remainder of ROS it otherwise neg.      PE:  Gen:   No apparent distress  Eye:    Visual scanning without deficit  Ear:    Response to auditory stimuli wnl  Lung:    Non-labored breathing on RA noted  Abd:    NTND per patient report  Lymph:    Neg for pitting/non-pitting edema BLE  Vasc:    Pulses palpable, CFT minimally delayed  Neuro:    Light touch sensation nearly absent distally  Derm:    Callusing/fissure plantar-medial bilateral 1st MPJ without underlying wound. Very close to wound formation.  MSK:   no acute MSK pathology.  Bunion bilateral   Calf:    Neg for redness, swelling or tenderness    Assessment:  61 year old male with very prominent pre-ulcerative hyperkeratotic lesions with fissures(no wounds) in setting of DMII with neuropathy and baseline digital deformities      Plan:  Discussed etiologies, anatomy and options  1.  very prominent pre-ulcerative hyperkeratotic lesions with fissures(no wounds) in setting of DMII with neuropathy and baseline digital deformities  -debrided with tissue nipper bilateral to remove hyperkeratosis.  No charge  -Orhtotic Lab for custom diabetic orthotics/shoes  -home callus instructions to minimize/slow regrowth, and prevent wound formation  -Nail care/callus care handout for routine foot care options    Follow up:  prn or sooner with acute issues           Eleazar MURGUIA" JANEEN Zafar FACFAS FACFAOM  Podiatric Foot & Ankle Surgeon  [facility]  [phone]

## 2021-04-08 NOTE — LETTER
"    4/8/2021         RE: Steve Morgan  [id number]  [facility] [id number]        Dear Colleague,    Thank you for referring your patient, Steve Morgan, to the [facility]. Please see a copy of my visit note below.    Foot & Ankle Surgery   April 8, 2021    S:  Pt is seen today for evaluation of fissures bilateral lower extremity 1st MPJ. He was seen Sept 2020 for the R foot, but this has now progressed to the left foot.  Diabetic, very limited sensation in his feet, he has diabetic shoes but no orthotics.  Ambulates without shoes regularly at home.    Vitals:    04/08/21 0849 04/08/21 0851   BP:  (!) 140/82   Weight:  112.5 kg (248 lb)   Height: 1.676 m (5' 6\") 1.676 m (5' 6\")   '      ROS - Pos for CC.  Patient denies current nausea, vomiting, chills, fevers, belly pain, calf pain, chest pain or SOB.  Complete remainder of ROS it otherwise neg.      PE:  Gen:   No apparent distress  Eye:    Visual scanning without deficit  Ear:    Response to auditory stimuli wnl  Lung:    Non-labored breathing on RA noted  Abd:    NTND per patient report  Lymph:    Neg for pitting/non-pitting edema BLE  Vasc:    Pulses palpable, CFT minimally delayed  Neuro:    Light touch sensation nearly absent distally  Derm:    Callusing/fissure plantar-medial bilateral 1st MPJ without underlying wound. Very close to wound formation.  MSK:   no acute MSK pathology.  Bunion bilateral   Calf:    Neg for redness, swelling or tenderness    Assessment:  61 year old male with very prominent pre-ulcerative hyperkeratotic lesions with fissures(no wounds) in setting of DMII with neuropathy and baseline digital deformities      Plan:  Discussed etiologies, anatomy and options  1.  very prominent pre-ulcerative hyperkeratotic lesions with fissures(no wounds) in setting of DMII with neuropathy and baseline digital deformities  -debrided with tissue nipper bilateral to remove hyperkeratosis.  No " charge  -Orhtotic Lab for custom diabetic orthotics/shoes  -home callus instructions to minimize/slow regrowth, and prevent wound formation  -Nail care/callus care handout for routine foot care options    Follow up:  prn or sooner with acute issues           Eleazar Zafar DPM [facility] FACFAOM  Podiatric Foot & Ankle Surgeon  [facility]  [phone]          Again, thank you for allowing me to participate in the care of your patient.        Sincerely,        Eleazar Zafar DPM, JANEEN

## 2021-04-08 NOTE — PATIENT INSTRUCTIONS
Thank you for choosing [facility] Podiatry / Foot & Ankle Surgery!    DR ERICKSON'S CLINIC LOCATIONS  [facility]   [address]   [place] [address] [place] [address]   [phone]       SET UP SURGERY: [phone]    APPOINTMENTS: [phone]    BILLING QUESTIONS: [phone]    FAX NUMBER: [phone]        Follow up: as needed    [place] [facility] LOCATIONS  [facility]  [address]  [place] [address]  Phone: [phone]  Fax: [phone] [facility]  [address]  [place] [address]  Phone: [phone]   Fax: [phone]   [facility]  [address]  [place] [address]  Phone: [phone]  Fax: [phone] [facility]  [address]  [place] [address]  Phone: [phone]   Fax: [phone]   [facility]   [address]  [place] [address]  Phone: [phone]  Fax: [phone] * Please call any location listed to make an appointment for a casting/fitting. Your referral was sent to their central office and they will all have the order on file.       CALLUS / CORNS / IPKs  When there is excessive friction or pressure on the skin, the body responds by making the skin thicker to protect the deeper structures from becoming exposed. While this works well to protect the deeper structures, the thickened skin can increase pressure and pain.    CALLUS: Flat, diffuse thickening are simple calluses and they are usually caused by friction. Often these are the result of rubbing on a shoe or going barefoot.    CORNS: Calluses with a central core between the toes are called corns. These result from prominent joints on adjacent toes rubbing together. Theses are a symptom of bone malalignment and will always recur unless the underlying bones are addressed  surgically.    IPKs: Calluses with a central core on the ball of the foot are usually IPKs (intractable plantar keratosis). These are caused by excessive pressure from the metatarsals, the bones that make up the ball of the foot. Often one of these bones is too long or too prominent.  Again, these will always recur unless the underlying bone issue is addressed. There is no cure for these. They will either go away by themselves, recur, or more could develop.    ROUTINE MAINTENANCE  1. File them down with a pumice stone or callus file a couple times a week.   2. An electric callus removing device. Amope Pedi Perfect Electronic Pedicure Foot File and Callus Remover can be a good option.   3. Lotion can be applied to soften the callus. A urea based cream such as Kersal or Vanicream or thicker cream with shea butter are good options.  4. Toe spacers or toe covers can be used for corns, gel pads can be used for other lesions on the bottom of the foot.   If there is a surgical pathology noted, such as a prominent bone, often this needs to be addressed surgically to minimize recurrence. However, sometimes the lesion simply migrates to another spot after surgery, so it is not a guaranteed cure.     There was also discussion of the cost structure of callus care if they were to come back and have it treated in the clinic. Explained that if insurance does not cover it, they would be billed. This charge could range from $100 - $160.  They were also provided information on places to get the callus treatment.      ROUTINE FOOT CARE (NAIL TRIMMING / CALLUSES)    Go to afcna.org ([employer]es Association) and search for providers near you.  Otherwise, this is a list we have gathered of  recommended locations/providers in [place].    "[employer]" [place]   [phone]   [facility]  [phone]  [email or web address]   [facility]  [phone]  [place] [address] [place] [facility]  [phone]  [email or web address] Roy  Padma, DPM  [address], [place] [address]  [phone] Thomas Lira, DPM  [address] [facility], [place] [address] [phone]   [facility]  [phone] [address] [place] [address]  [facility]  Dr. Ezekiel Snowden  [phone]  [place], [place]   [facility]  [phone]  [facility]  (does not take BCBS) FYI:  *Some providers accept insurance while others are out of pocket. Please contact them for details*

## 2021-04-10 ENCOUNTER — HEALTH MAINTENANCE LETTER (OUTPATIENT)
Age: 61
End: 2021-04-10

## 2021-04-15 ENCOUNTER — VIRTUAL VISIT (OUTPATIENT)
Dept: PSYCHOLOGY | Facility: CLINIC | Age: 61
End: 2021-04-15
Payer: MEDICARE

## 2021-04-15 DIAGNOSIS — F33.1 MODERATE EPISODE OF RECURRENT MAJOR DEPRESSIVE DISORDER (H): Primary | ICD-10-CM

## 2021-04-15 PROCEDURE — 90834 PSYTX W PT 45 MINUTES: CPT | Mod: 95 | Performed by: SOCIAL WORKER

## 2021-04-15 ASSESSMENT — PATIENT HEALTH QUESTIONNAIRE - PHQ9: SUM OF ALL RESPONSES TO PHQ QUESTIONS 1-9: 14

## 2021-04-15 NOTE — PROGRESS NOTES
Progress Note    Patient Name: Steve Morgan  Date: 4/15/2021         Service Type: Individual      Session Start Time: 11:00  Session End Time: 11:52     Session Length: 52    Session #: 15    Attendees: Client attended alone    Service Modality:  Video Visit:    Telemedicine Visit: The patient's condition can be safely assessed and treated via synchronous audio and visual telemedicine encounter.      Reason for Telemedicine Visit: Services only offered telehealth    Originating Site (Patient Location): Patient's home    Distant Site (Provider Location): Provider Remote Setting    Consent:  The patient/guardian has verbally consented to: the potential risks and benefits of telemedicine (video visit) versus in person care; bill my insurance or make self-payment for services provided; and responsibility for payment of non-covered services.     Patient would like the video invitation sent by: Send to e-mail at: [email or web address]    Mode of Communication:  Video Conference via Amwell    As the provider I attest to compliance with applicable laws and regulations related to telemedicine.     Treatment Plan Last Reviewed: 2/3/2021  PHQ-9 / JOSE MANUEL-7 : 14 - 4/15/2021    DATA  Interactive Complexity: No  Crisis: No       Progress Since Last Session (Related to Symptoms / Goals / Homework):   Symptoms: No change Patient reported feeling good today, but reported that there are times he gets so frustrated that he thinks people would be better off without him.    Homework: Partially completed       Episode of Care Goals: Achieved / completed to satisfaction - PREPARATION (Decided to change - considering how); Intervened by negotiating a change plan and determining options / strategies for behavior change, identifying triggers, exploring social supports, and working towards setting a date to begin behavior change  Satisfactory progress - ACTION (Actively working towards change);  "Intervened by reinforcing change plan / affirming steps taken     Current / Ongoing Stressors and Concerns:   Patient reports stressors of getting angry while driving and that this has caused him and his wife to experience a frightening situation with another .  Patient reports that he becomes mad with the people that he sees most in his life, his parents, his son, his wife and his cat.  He also notes another concern that causes stress in this area of his life is his procrastination and that he spends most of his day \"on the couch\".       Treatment Objective(s) Addressed in This Session:   Mindfully note anger  Increase interest, engagement, and pleasure in doing things  Decrease thoughts that you'd be better off dead or of suicide / self-harm     Intervention:   ACT: Discussed success with patient about success with reminders and that he has been trying to spend time on days reflecting on things he is thankful for.  Patient discussed he continues to struggle with walking.  Discussed with patient how are minds can try and help us by telling us things that can be opposite of what is important or helpful to us.  Patient noted that he finds exercise boring and that his mind is trying to help him do something more interesting.  Discussed with patient how he could make this more interesting.  Patient and writer discussed how patient's frustration can cause him to feel that people would be better off without him.  Explored with patient how when he was growing up that his father was very angry.  Discussed normalization of emotions with plans to continue conversation next session.       ASSESSMENT: Current Emotional / Mental Status (status of significant symptoms):   Risk status (Self / Other harm or suicidal ideation)   Patient denies current fears or concerns for personal safety.   Patient reports the following current or recent suicidal ideation or behaviors: Reports thought of \"People would be better off without " "me\" when he becomes highly frustrated.   Patient denies current or recent homicidal ideation or behaviors.   Patient denies current or recent self injurious behavior or ideation.   Patient denies other safety concerns.   Patient reports there has been no change in risk factors since their last session.     Patient reports there has been no change in protective factors since their last session.     Recommended that patient call 911 or go to the local ED should there be a change in any of these risk factors.     Appearance:   Appropriate    Eye Contact:   Good    Psychomotor Behavior: Normal    Attitude:   Cooperative  Interested Friendly   Orientation:   All   Speech    Rate / Production: Normal/ Responsive    Volume:  Loud    Mood:    Normal   Affect:    Appropriate    Thought Content:  Clear  Rumination    Thought Form:  Coherent  Logical    Insight:    Good  and Fair      Medication Review:   No changes to current psychiatric medication(s)     Medication Compliance:   Yes     Changes in Health Issues:   None reported     Chemical Use Review:   Substance Use: Chemical use reviewed, no active concerns identified      Tobacco Use: No current tobacco use.      Diagnosis:  1. Moderate episode of recurrent major depressive disorder (H)        Collateral Reports Completed:   Not Applicable    PLAN: (Patient Tasks / Therapist Tasks / Other)  Patient will go for walks during [employer]        Veronica Nica-Boyden, Dash Hudson                                                         ______________________________________________________________________    Treatment Plan    Patient's Name: Steve Morgan  YOB: 1960    Date: 2/3/2021    DSM5 Diagnoses: 296.32 (F33.1) Major Depressive Disorder, Recurrent Episode, Moderate With melancholic features  Psychosocial / Contextual Factors: Individual Factors Patient reports that he has family friend's but otherwise is socially isolated, Community Factors Patient reports " that he was volunteering, but has been unable to due to finanical reasons and Health- Seeking Factors Patient reports that neuropathy from diabetes has made it so that he cannot work and creates other difficulties for him.    WHODAS: 20    Referral / Collaboration:  Referral to another professional/service is not indicated at this time..    Anticipated number of session or this episode of care: 30-40      MeasurableTreatment Goal(s) related to diagnosis / functional impairment(s)  Goal 1: Patient will manage my anger when I am driving    I will know I've met my goal when I am a calmer drive and would be able to let go of a situation that I don't agree with.      Objective #A (Patient Action)    Patient will use progressive relaxation exercise once in the morning and once in the evening to help relieve tension  practice deep diaphragm breathing once daily for at least 5 minutes to reduce anger / irritability and regain a calmer, more clear state of mind.  Status: Completed - Date: 2/3/2021     Intervention(s)  Therapist will assign homework to engage in progressive musle relaxation or breathing depending on patient preference  teach teach breathing or progressive muscle relaxation.    Objective #B  Patient will identify patterns of escalation  (i.e. tightness in chest, flushed face, increased heart rate, clenched hands, etc.).  Status: Completed - Date: 2/3/2021     Intervention(s)  Therapist will assign homework to engage in using skills to help with reducing anger  role-play using skills in the moment  teach cognitive distortions.    Objective #C  Patient will practice one mindfulness skill each day for 5 minutes.  Status: Continued - Date(s): 2/3/2021     Intervention(s)  Therapist will assign homework practice mindfulness while driving  teach mindfulness.      Goal 2: Patient will be able to have better communication with his wife    I will know I've met my goal when I am not raising my voice when I am  frustrated.      Objective #A (Patient Action)    Status: Continued - Date(s): 2/3/2021     Patient will learn & utilize at least 1 assertive communication skills weekly.    Intervention(s)  Therapist will assign homework to engage in assertive communication skills  role-play assertiveness skills.    Objective #B  Patient will Identify negative self-talk and behaviors: challenge core beliefs, myths, and actions.    Status: Continued - Date(s): 2/3/2021     Intervention(s)  Therapist will assign homework to engage in cognitive restructuring  teach emotional recognition/identification. and thoughts connected to these emotions.    Objective #C  Patient will identify at least 3 alternative response(s) to aggressive behaviors.  Status: Continued - Date(s): 2/3/2021     Intervention(s)  Therapist will assign homework to engage in skills that can reduce vulnerability  provide support to engage in coping skills when feeling frustrated.      Goal 3: Patient will not procrastinate    I will know I've met my goal when I am up and doing things and when I see something that needs to be done I do it (cleaning litter boxes, taking out the garbage and taking care of mail ).      Objective #A (Patient Action)    Status: Completed - Date: 2/3/2021     Patient will identify 3-5 fears / thoughts that contribute to feeling anxious.    Intervention(s)  Therapist will assign homework to pay attention to thoughts and feelings when procrastinating  provide education on the connection of thoughts and feelings related to procrastination.    Objective #B  Patient will Increase interest, engagement, and pleasure in doing things.    Status: Completed - Date: 2/3/2021     Intervention(s)  Therapist will assign homework to engage in a daily schedule  teach how to improve motivation.    Objective #C  Patient will Feel less tired and more energy during the day .  Status: Continued - Date(s): 2/3/2021     Intervention(s)  Therapist will assign  homework to engage in activity to increase motivation by gaging amount of energy activities take  teach improving motivation skills by gaging energy level of activities.      Patient has reviewed and agreed to the above plan.      SAURABH Domínguez  February 3, 2021

## 2021-04-23 ENCOUNTER — TELEPHONE (OUTPATIENT)
Dept: ENDOCRINOLOGY | Facility: CLINIC | Age: 61
End: 2021-04-23

## 2021-04-23 NOTE — TELEPHONE ENCOUNTER
Fax from [facility] to Diana Butler requesting PA renewal ASAP for LEVOXYL 137 MCG tablet - KAMER[facility]  Patient now following with endo Dr. Meraz. Message forwarded to endo staff.   Gaurav Lovell RN

## 2021-04-26 NOTE — TELEPHONE ENCOUNTER
Call to [facility] pharmacy staff informed to contact Dr. Meraz's office for follow through.   Gaurav Lovell RN

## 2021-04-30 ENCOUNTER — VIRTUAL VISIT (OUTPATIENT)
Dept: PSYCHOLOGY | Facility: CLINIC | Age: 61
End: 2021-04-30
Payer: MEDICARE

## 2021-04-30 DIAGNOSIS — F33.0 MILD EPISODE OF RECURRENT MAJOR DEPRESSIVE DISORDER (H): Primary | ICD-10-CM

## 2021-04-30 PROCEDURE — 90834 PSYTX W PT 45 MINUTES: CPT | Mod: 95 | Performed by: SOCIAL WORKER

## 2021-04-30 NOTE — PROGRESS NOTES
Progress Note    Patient Name: Steve Morgan  Date: 4/30/2021         Service Type: Individual      Session Start Time: 14:00  Session End Time: 14:52     Session Length: 52    Session #: 16    Attendees: Client attended alone    Service Modality:  Video Visit:    Telemedicine Visit: The patient's condition can be safely assessed and treated via synchronous audio and visual telemedicine encounter.      Reason for Telemedicine Visit: Services only offered telehealth    Originating Site (Patient Location): Patient's home    Distant Site (Provider Location): Provider Remote Setting    Consent:  The patient/guardian has verbally consented to: the potential risks and benefits of telemedicine (video visit) versus in person care; bill my insurance or make self-payment for services provided; and responsibility for payment of non-covered services.     Patient would like the video invitation sent by: Send to e-mail at: [email or web address]    Mode of Communication:  Video Conference via Amwell    As the provider I attest to compliance with applicable laws and regulations related to telemedicine.     Treatment Plan Last Reviewed: 4/30/2021  PHQ-9 / JOSE MANUEL-7 : 14 - 4/15/2021    DATA  Interactive Complexity: No  Crisis: No       Progress Since Last Session (Related to Symptoms / Goals / Homework):   Symptoms: Improving Patient more interested in building his motivation    Homework: Did not complete       Episode of Care Goals: Achieved / completed to satisfaction - PREPARATION (Decided to change - considering how); Intervened by negotiating a change plan and determining options / strategies for behavior change, identifying triggers, exploring social supports, and working towards setting a date to begin behavior change     Current / Ongoing Stressors and Concerns:   Patient reports stressors of getting angry while driving and that this has caused him and his wife to experience a  "frightening situation with another .  Patient reports that he becomes mad with the people that he sees most in his life, his parents, his son, his wife and his cat.  He also notes another concern that causes stress in this area of his life is his procrastination and that he spends most of his day \"on the couch\".       Treatment Objective(s) Addressed in This Session:   Increase interest, engagement, and pleasure in doing things     Intervention:   ACT: Discussed with patient behavioral activation.  Patient would like to build motivation and help out more around the house.  He is motivated to stop fights with wife and be a better partner.  He was able to come up with a schedule that had some flexibility and had items that were enjoyable on it too.       ASSESSMENT: Current Emotional / Mental Status (status of significant symptoms):   Risk status (Self / Other harm or suicidal ideation)   Patient denies current fears or concerns for personal safety.   Patient denies current or recent suicidal ideation or behaviors.   Patient denies current or recent homicidal ideation or behaviors.   Patient denies current or recent self injurious behavior or ideation.   Patient denies other safety concerns.   Patient reports there has been no change in risk factors since their last session.     Patient reports there has been no change in protective factors since their last session.     Recommended that patient call 911 or go to the local ED should there be a change in any of these risk factors.     Appearance:   Appropriate    Eye Contact:   Good    Psychomotor Behavior: Normal    Attitude:   Cooperative  Interested Friendly   Orientation:   All   Speech    Rate / Production: Normal/ Responsive    Volume:  Loud    Mood:    Depressed    Affect:    Appropriate    Thought Content:  Clear  Rumination    Thought Form:  Coherent  Logical    Insight:    Good  and Fair      Medication Review:   No changes to current psychiatric " medication(s)     Medication Compliance:   Yes     Changes in Health Issues:   None reported     Chemical Use Review:   Substance Use: Chemical use reviewed, no active concerns identified      Tobacco Use: No current tobacco use.      Diagnosis:  1. Mild episode of recurrent major depressive disorder (H)        Collateral Reports Completed:   Not Applicable    PLAN: (Patient Tasks / Therapist Tasks / Other)  Patient will begin using a daily schedule to help him plan his day more        Veronica Chavis, LICSW                                                         ______________________________________________________________________    Treatment Plan    Patient's Name: Steve Morgan  YOB: 1960    Date: 4/30/2021    DSM5 Diagnoses: 296.32 (F33.1) Major Depressive Disorder, Recurrent Episode, Moderate With melancholic features  Psychosocial / Contextual Factors: Individual Factors Patient reports that he has family friend's but otherwise is socially isolated, Community Factors Patient reports that he was volunteering, but has been unable to due to finanical reasons and Health- Seeking Factors Patient reports that neuropathy from diabetes has made it so that he cannot work and creates other difficulties for him.    WHODAS: 20    Referral / Collaboration:  Referral to another professional/service is not indicated at this time..    Anticipated number of session or this episode of care: 30-40      MeasurableTreatment Goal(s) related to diagnosis / functional impairment(s)  Goal 1: Patient will manage my anger when I am driving    I will know I've met my goal when I am a calmer drive and would be able to let go of a situation that I don't agree with.      Objective #A (Patient Action)    Patient will use progressive relaxation exercise once in the morning and once in the evening to help relieve tension  practice deep diaphragm breathing once daily for at least 5 minutes to reduce anger / irritability  and regain a calmer, more clear state of mind.  Status: Completed - Date: 2/3/2021     Intervention(s)  Therapist will assign homework to engage in progressive musle relaxation or breathing depending on patient preference  teach teach breathing or progressive muscle relaxation.    Objective #B  Patient will identify patterns of escalation  (i.e. tightness in chest, flushed face, increased heart rate, clenched hands, etc.).  Status: Completed - Date: 2/3/2021     Intervention(s)  Therapist will assign homework to engage in using skills to help with reducing anger  role-play using skills in the moment  teach cognitive distortions.    Objective #C  Patient will practice one mindfulness skill each day for 5 minutes.  Status: Continued - Date(s):4/30/2021    Intervention(s)  Therapist will assign homework practice mindfulness while driving  teach mindfulness.      Goal 2: Patient will be able to have better communication with his wife    I will know I've met my goal when I am not raising my voice when I am frustrated.      Objective #A (Patient Action)    Status: Continued - Date(s): 4/30/2021     Patient will learn & utilize at least 1 assertive communication skills weekly.    Intervention(s)  Therapist will assign homework to engage in assertive communication skills  role-play assertiveness skills.    Objective #B  Patient will Identify negative self-talk and behaviors: challenge core beliefs, myths, and actions.    Status: Continued - Date(s): 4/30/2021     Intervention(s)  Therapist will assign homework to engage in cognitive restructuring  teach emotional recognition/identification. and thoughts connected to these emotions.    Objective #C  Patient will identify at least 3 alternative response(s) to aggressive behaviors.  Status: Continued - Date(s): 4/30/2021     Intervention(s)  Therapist will assign homework to engage in skills that can reduce vulnerability  provide support to engage in coping skills when feeling  frustrated.      Goal 3: Patient will not procrastinate    I will know I've met my goal when I am up and doing things and when I see something that needs to be done I do it (cleaning litter boxes, taking out the garbage and taking care of mail ).      Objective #A (Patient Action)    Status: Completed - Date: 2/3/2021     Patient will identify 3-5 fears / thoughts that contribute to feeling anxious.    Intervention(s)  Therapist will assign homework to pay attention to thoughts and feelings when procrastinating  provide education on the connection of thoughts and feelings related to procrastination.    Objective #B  Patient will Increase interest, engagement, and pleasure in doing things.    Status: Completed - Date: 2/3/2021     Intervention(s)  Therapist will assign homework to engage in a daily schedule  teach how to improve motivation.    Objective #C  Patient will Feel less tired and more energy during the day .  Status: Continued - Date(s): 4/30/2021     Intervention(s)  Therapist will assign homework to engage in activity to increase motivation by gaging amount of energy activities take  teach improving motivation skills by gaging energy level of activities.      Patient has reviewed and agreed to the above plan.      Veronica Chavis, [facility]  April 30, 2021

## 2021-05-10 ENCOUNTER — MYC MEDICAL ADVICE (OUTPATIENT)
Dept: ENDOCRINOLOGY | Facility: CLINIC | Age: 61
End: 2021-05-10

## 2021-05-10 NOTE — TELEPHONE ENCOUNTER
See patient's [employer] message below.    Prior Authorization Retail Medication Request    Medication/Dose: Levoxyl  ICD code (if different than what is on RX):    Previously Tried and Failed:    Rationale:      Insurance Name:  [facility]  Insurance ID:  [id number]       Pharmacy Information (if different than what is on RX)  Name:  [facility]  Phone:  [phone]

## 2021-05-12 NOTE — TELEPHONE ENCOUNTER
PA Initiation    Medication: LEVOXYL 137 MCG tablet   Insurance Company: [employer] Part D - Phone [phone] Fax [phone]  Pharmacy Filling the Rx: [id number] [address] [address] [facility].  Filling Pharmacy Phone: [phone]  Filling Pharmacy Fax: [phone]  Start Date: 5/12/2021

## 2021-05-12 NOTE — TELEPHONE ENCOUNTER
Prior Authorization Approval    Authorization Effective Date: 2/11/2021  Authorization Expiration Date: 5/12/2022  Medication: LEVOXYL 137 MCG tablet--APPROVED  Approved Dose/Quantity:   Reference #:     Insurance Company: [employer] Part D - Phone [phone] Fax [phone]  Expected CoPay:       CoPay Card Available:      Foundation Assistance Needed:    Which Pharmacy is filling the prescription (Not needed for infusion/clinic administered): [id number] [place], [place] - [address] [address].  Pharmacy Notified: Yes  Patient Notified: Yes **Instructed pharmacy to notify patient when script is ready to /ship.**

## 2021-05-13 ENCOUNTER — TELEPHONE (OUTPATIENT)
Dept: WOUND CARE | Facility: CLINIC | Age: 61
End: 2021-05-13

## 2021-05-13 ENCOUNTER — VIRTUAL VISIT (OUTPATIENT)
Dept: PSYCHOLOGY | Facility: CLINIC | Age: 61
End: 2021-05-13
Payer: MEDICARE

## 2021-05-13 ENCOUNTER — VIRTUAL VISIT (OUTPATIENT)
Dept: FAMILY MEDICINE | Facility: CLINIC | Age: 61
End: 2021-05-13
Payer: MEDICARE

## 2021-05-13 DIAGNOSIS — E11.40 TYPE 2 DIABETES MELLITUS WITH DIABETIC NEUROPATHY, WITH LONG-TERM CURRENT USE OF INSULIN (H): Primary | ICD-10-CM

## 2021-05-13 DIAGNOSIS — R07.9 EXERTIONAL CHEST PAIN: ICD-10-CM

## 2021-05-13 DIAGNOSIS — R45.4 EXCESSIVE ANGER: ICD-10-CM

## 2021-05-13 DIAGNOSIS — Z79.4 TYPE 2 DIABETES MELLITUS WITH DIABETIC NEUROPATHY, WITH LONG-TERM CURRENT USE OF INSULIN (H): Primary | ICD-10-CM

## 2021-05-13 DIAGNOSIS — I10 ESSENTIAL HYPERTENSION WITH GOAL BLOOD PRESSURE LESS THAN 140/90: ICD-10-CM

## 2021-05-13 DIAGNOSIS — E03.4 HYPOTHYROIDISM DUE TO ACQUIRED ATROPHY OF THYROID: ICD-10-CM

## 2021-05-13 DIAGNOSIS — L98.499 ULCER OF FINGER, UNSPECIFIED ULCER STAGE (H): ICD-10-CM

## 2021-05-13 DIAGNOSIS — F33.1 MODERATE EPISODE OF RECURRENT MAJOR DEPRESSIVE DISORDER (H): ICD-10-CM

## 2021-05-13 DIAGNOSIS — E78.5 HYPERLIPIDEMIA LDL GOAL <100: ICD-10-CM

## 2021-05-13 DIAGNOSIS — K21.00 GASTROESOPHAGEAL REFLUX DISEASE WITH ESOPHAGITIS WITHOUT HEMORRHAGE: ICD-10-CM

## 2021-05-13 DIAGNOSIS — F33.0 MILD EPISODE OF RECURRENT MAJOR DEPRESSIVE DISORDER (H): Primary | ICD-10-CM

## 2021-05-13 PROCEDURE — 99214 OFFICE O/P EST MOD 30 MIN: CPT | Mod: 95 | Performed by: PHYSICIAN ASSISTANT

## 2021-05-13 PROCEDURE — 90834 PSYTX W PT 45 MINUTES: CPT | Mod: 95 | Performed by: SOCIAL WORKER

## 2021-05-13 RX ORDER — SULFAMETHOXAZOLE/TRIMETHOPRIM 800-160 MG
1 TABLET ORAL 2 TIMES DAILY
Qty: 20 TABLET | Refills: 0 | Status: SHIPPED | OUTPATIENT
Start: 2021-05-13 | End: 2021-05-23

## 2021-05-13 RX ORDER — SIMVASTATIN 20 MG
20 TABLET ORAL AT BEDTIME
Qty: 90 TABLET | Refills: 3 | Status: SHIPPED | OUTPATIENT
Start: 2021-05-13 | End: 2022-05-27

## 2021-05-13 RX ORDER — PANTOPRAZOLE SODIUM 40 MG/1
40 TABLET, DELAYED RELEASE ORAL DAILY
Qty: 90 TABLET | Refills: 1 | Status: SHIPPED | OUTPATIENT
Start: 2021-05-13 | End: 2021-11-16

## 2021-05-13 RX ORDER — ATENOLOL 25 MG/1
25 TABLET ORAL DAILY
Qty: 90 TABLET | Refills: 1 | Status: SHIPPED | OUTPATIENT
Start: 2021-05-13 | End: 2023-10-24

## 2021-05-13 RX ORDER — CITALOPRAM HYDROBROMIDE 40 MG/1
40 TABLET ORAL DAILY
Qty: 90 TABLET | Refills: 1 | Status: SHIPPED | OUTPATIENT
Start: 2021-05-13 | End: 2021-11-16

## 2021-05-13 RX ORDER — LEVOTHYROXINE SODIUM 137 UG/1
137 TABLET ORAL DAILY
Qty: 90 TABLET | Refills: 1 | Status: SHIPPED | OUTPATIENT
Start: 2021-05-13 | End: 2021-08-24

## 2021-05-13 RX ORDER — FAMOTIDINE 40 MG/1
40 TABLET, FILM COATED ORAL DAILY
Qty: 90 TABLET | Refills: 1 | Status: SHIPPED | OUTPATIENT
Start: 2021-05-13 | End: 2021-11-16

## 2021-05-13 RX ORDER — FLASH GLUCOSE SENSOR
KIT MISCELLANEOUS
Qty: 2 EACH | Refills: 11 | Status: SHIPPED | OUTPATIENT
Start: 2021-05-13 | End: 2023-01-04

## 2021-05-13 RX ORDER — BUPROPION HYDROCHLORIDE 300 MG/1
300 TABLET ORAL EVERY MORNING
Qty: 90 TABLET | Refills: 1 | Status: SHIPPED | OUTPATIENT
Start: 2021-05-13 | End: 2022-10-04

## 2021-05-13 RX ORDER — LOSARTAN POTASSIUM 50 MG/1
50 TABLET ORAL DAILY
Qty: 90 TABLET | Refills: 3 | Status: SHIPPED | OUTPATIENT
Start: 2021-05-13 | End: 2021-08-31

## 2021-05-13 RX ORDER — MUPIROCIN 20 MG/G
OINTMENT TOPICAL 2 TIMES DAILY
Qty: 22 G | Refills: 1 | Status: SHIPPED | OUTPATIENT
Start: 2021-05-13 | End: 2022-10-18

## 2021-05-13 NOTE — PROGRESS NOTES
Caio is a 61 year old who is being evaluated via a billable video visit.      How would you like to obtain your AVS? MyChart  If the video visit is dropped, the invitation should be resent by: Send to e-mail at: [email or web address]  Will anyone else be joining your video visit? No    Video Start Time: 11:16 AM    Assessment & Plan     Type 2 diabetes mellitus with diabetic neuropathy, with long-term current use of insulin (H)  Continue care with endocrinology,  Check sugars daily  - ALT; Future  - REVIEW OF HEALTH MAINTENANCE PROTOCOL ORDERS  - Lipid panel reflex to direct LDL Fasting; Future  - Continuous Blood Gluc Sensor (FREESTYLE GIULIANA 14 DAY SENSOR) [facility]; USE 1 SENSOR EVERY 14 DAYS    Moderate episode of recurrent major depressive disorder (H)  controlled  - citalopram (CELEXA) 40 MG tablet; Take 1 tablet (40 mg) by mouth daily  - buPROPion (WELLBUTRIN XL) 300 MG 24 hr tablet; Take 1 tablet (300 mg) by mouth every morning    Excessive anger  Controlled   - buPROPion (WELLBUTRIN XL) 300 MG 24 hr tablet; Take 1 tablet (300 mg) by mouth every morning    Essential hypertension with goal blood pressure less than 140/90    - atenolol (TENORMIN) 25 MG tablet; Take 1 tablet (25 mg) by mouth daily  - losartan (COZAAR) 50 MG tablet; Take 1 tablet (50 mg) by mouth daily    Hypothyroidism due to acquired atrophy of thyroid  PA was approved.   - LEVOXYL 137 MCG tablet; Take 1 tablet (137 mcg) by mouth daily Dispense name brand Levoxyl only, no generics    Hyperlipidemia LDL goal <100    - Lipid panel reflex to direct LDL Fasting; Future  - simvastatin (ZOCOR) 20 MG tablet; Take 1 tablet (20 mg) by mouth At Bedtime    Ulcer of finger, unspecified ulcer stage (H)  Really should be seen in clinic. Will refer to wound care  - WOUND CARE REFERRAL; Future  - sulfamethoxazole-trimethoprim (BACTRIM DS) 800-160 MG tablet; Take 1 tablet by mouth 2 times daily for 10 days  - mupirocin (BACTROBAN) 2 % external ointment; Apply  "topically 2 times daily  - CBC with platelets; Future    Gastroesophageal reflux disease with esophagitis without hemorrhage  Stop Zegrid, if new meds do not improve symptoms, consult GI-referral placed.   - famotidine (PEPCID) 40 MG tablet; Take 1 tablet (40 mg) by mouth daily  - GASTROENTEROLOGY ADULT REF CONSULT ONLY; Future  - pantoprazole (PROTONIX) 40 MG EC tablet; Take 1 tablet (40 mg) by mouth daily    Exertional chest pain  May be uncontrolled GERD. Last stress test was 2018  - Echocardiogram Dobutamine Stress with Lumason; Future  BMI:   Estimated body mass index is 40.03 kg/m  as calculated from the following:    Height as of 4/8/21: 1.676 m (5' 6\").    Weight as of 4/8/21: 112.5 kg (248 lb).         ANASTASIA Hendrix [facility] [place]    Subjective   Caio is a 61 year old who presents for the following health issues     HPI     Concern - Cut his middle fingure   Onset: X6 months   Description: Patient states that he cut his middle finger in the crack and it won't heal   Progression of Symptoms:  same  Therapies tried and outcome: Bacitracin     Patient states that with activity not all the time. That he gets heavy chested. Feels like when you get cold air in your lungs that's what it feels like.  Diabetes Follow-up      How often are you checking your blood sugar? Not at all    What concerns do you have today about your diabetes? Getting infections often     Do you have any of these symptoms? (Select all that apply)  Numbness in feet        Hyperlipidemia Follow-Up      Are you regularly taking any medication or supplement to lower your cholesterol?   yes    Are you having muscle aches or other side effects that you think could be caused by your cholesterol lowering medication?  No    Hypertension Follow-up      Do you check your blood pressure regularly outside of the clinic? Yes     Are you following a low salt diet? No    Are your blood pressures ever more than 140 on the " top number (systolic) OR more   than 90 on the bottom number (diastolic), for example 140/90? No    BP Readings from Last 2 Encounters:   04/08/21 (!) 140/82   09/25/20 132/70     Hemoglobin A1C (%)   Date Value   02/22/2021 7.4 (H)   03/12/2020 9.9 (H)     LDL Cholesterol Calculated (mg/dL)   Date Value   03/05/2020 88   01/16/2020 97       Depression Followup    How are you doing with your depression since your last visit? No change    Are you having other symptoms that might be associated with depression? No    Have you had a significant life event?  No     Are you feeling anxious or having panic attacks?   No    Do you have any concerns with your use of alcohol or other drugs? No    Social History     Tobacco Use     Smoking status: Never Smoker     Smokeless tobacco: Never Used   Substance Use Topics     Alcohol use: Yes     Frequency: Monthly or less     Drinks per session: 1 or 2     Binge frequency: Never     Comment: Occassionally     Drug use: No     PHQ 3/3/2021 3/19/2021 4/15/2021   PHQ-9 Total Score 12 17 14   Q9: Thoughts of better off dead/self-harm past 2 weeks Not at all Not at all Several days     JOSE MANUEL-7 SCORE 9/1/2020 9/1/2020 10/14/2020   Total Score - - -   Total Score - 11 (moderate anxiety) 4 (minimal anxiety)   Total Score 11 11 4     Last PHQ-9 4/15/2021   1.  Little interest or pleasure in doing things 0   2.  Feeling down, depressed, or hopeless 1   3.  Trouble falling or staying asleep, or sleeping too much 3   4.  Feeling tired or having little energy 2   5.  Poor appetite or overeating 3   6.  Feeling bad about yourself 1   7.  Trouble concentrating 0   8.  Moving slowly or restless 3   Q9: Thoughts of better off dead/self-harm past 2 weeks 1   PHQ-9 Total Score 14   Difficulty at work, home, or with people -     JOSE MANUEL-7  10/14/2020   1. Feeling nervous, anxious, or on edge 0   2. Not being able to stop or control worrying 0   3. Worrying too much about different things 1   4. Trouble  relaxing 0   5. Being so restless that it is hard to sit still 0   6. Becoming easily annoyed or irritable 3   7. Feeling afraid, as if something awful might happen 0   JOSE MANUEL-7 Total Score 4   If you checked any problems, how difficult have they made it for you to do your work, take care of things at home, or get along with other people? -       Suicide Assessment Five-step Evaluation and Treatment (SAFE-T)          CHEST PAIN     Onset: 6 months    Description:   Location:  entire chest  Character: burning  Radiation: epigastrium  Duration: waxing and waning     Intensity: mild, moderate    Progression of Symptoms:  intermittent    Accompanying Signs & Symptoms:  Shortness of breath: sometimes  Sweating: no  Nausea/vomiting: YES  Lightheadedness: no  Palpitations: no  Fever/Chills: no  Cough: no  Heartburn: YES    History:   Family history of heart disease YES  Tobacco use: no    Precipitating factors:   Worse with exertion: YES  Worse with deep breaths :  no  Related to food: no    Alleviating factors:         Therapies Tried and outcome: Zegrid, not helping         Review of Systems   Constitutional, HEENT, cardiovascular, pulmonary, GI, , musculoskeletal, neuro, skin, endocrine and psych systems are negative, except as otherwise noted.      Objective           Vitals:  No vitals were obtained today due to virtual visit.    Physical Exam   GENERAL: Healthy, alert and no distress  EYES: Eyes grossly normal to inspection.  No discharge or erythema, or obvious scleral/conjunctival abnormalities.  RESP: No audible wheeze, cough, or visible cyanosis.  No visible retractions or increased work of breathing.    SKIN: right 2nd digit with dried ulcer-could not be clearly assessed due to poor video quality  NEURO: Cranial nerves grossly intact.  Mentation and speech appropriate for age.  PSYCH: Mentation appears normal, affect normal/bright, judgement and insight intact, normal speech and appearance well-groomed.                 Video-Visit Details    Type of service:  Video Visit    Video End Time:11:54 AM    Originating Location (pt. Location): Home    Distant Location (provider location):  [facility]     Platform used for Video Visit: Relevvant

## 2021-05-13 NOTE — PROGRESS NOTES
Progress Note    Patient Name: Steve Morgan  Date: 5/13/2021         Service Type: Individual      Session Start Time: 14:30  Session End Time: 15:22     Session Length: 52    Session #: 17    Attendees: Client attended alone    Service Modality:  Video Visit:    Telemedicine Visit: The patient's condition can be safely assessed and treated via synchronous audio and visual telemedicine encounter.      Reason for Telemedicine Visit: Services only offered telehealth    Originating Site (Patient Location): Patient's home    Distant Site (Provider Location): Provider Remote Setting    Consent:  The patient/guardian has verbally consented to: the potential risks and benefits of telemedicine (video visit) versus in person care; bill my insurance or make self-payment for services provided; and responsibility for payment of non-covered services.     Patient would like the video invitation sent by: Send to e-mail at: [email or web address]    Mode of Communication:  Video Conference via [employer]well    As the provider I attest to compliance with applicable laws and regulations related to telemedicine.     Treatment Plan Last Reviewed: 4/30/2021  PHQ-9 / JOSE MANUEL-7 : 14 - 4/15/2021    DATA  Interactive Complexity: No  Crisis: No       Progress Since Last Session (Related to Symptoms / Goals / Homework):   Symptoms: No change Patient reports that he has been engaged more in activities, but not the activities that he set out for himself.    Homework: Partially completed       Episode of Care Goals: Achieved / completed to satisfaction - PREPARATION (Decided to change - considering how); Intervened by negotiating a change plan and determining options / strategies for behavior change, identifying triggers, exploring social supports, and working towards setting a date to begin behavior change      Current / Ongoing Stressors and Concerns:   Patient reports stressors of getting angry while driving  "and that this has caused him and his wife to experience a frightening situation with another .  Patient reports that he becomes mad with the people that he sees most in his life, his parents, his son, his wife and his cat.  He also notes another concern that causes stress in this area of his life is his procrastination and that he spends most of his day \"on the couch\".       Treatment Objective(s) Addressed in This Session:   Increase interest, engagement, and pleasure in doing things  learn & utilize at least 1 assertive communication skills weekly     Intervention:   ACT: Discussed with patient his value of his family and how engaging in this value is focuing on his parents being alive and not on their eventual death.  Continued to support patient living in the moment.  Discussed with patient how he was not able to clean the house because other people called and asked him to do things and that he put off his needs for others.  Began discussing boundaries with patient and effective communication about boundaries.  Worked with patient on setting up days that would be his cleaning days to help him with setting this boundary for himself.       ASSESSMENT: Current Emotional / Mental Status (status of significant symptoms):   Risk status (Self / Other harm or suicidal ideation)   Patient denies current fears or concerns for personal safety.   Patient denies current or recent suicidal ideation or behaviors.   Patient denies current or recent homicidal ideation or behaviors.   Patient denies current or recent self injurious behavior or ideation.   Patient denies other safety concerns.   Patient reports there has been no change in risk factors since their last session.     Patient reports there has been no change in protective factors since their last session.     Recommended that patient call 911 or go to the local ED should there be a change in any of these risk factors.     Appearance:   Appropriate    Eye " Contact:   Good    Psychomotor Behavior: Normal    Attitude:   Cooperative  Interested Friendly   Orientation:   All   Speech    Rate / Production: Normal/ Responsive    Volume:  Normal    Mood:    Normal   Affect:    Appropriate    Thought Content:  Clear  Rumination    Thought Form:  Coherent  Logical    Insight:    Good  and Fair      Medication Review:   No changes to current psychiatric medication(s)     Medication Compliance:   Yes     Changes in Health Issues:   None reported     Chemical Use Review:   Substance Use: Chemical use reviewed, no active concerns identified      Tobacco Use: No current tobacco use.      Diagnosis:  1. Mild episode of recurrent major depressive disorder (H)        Collateral Reports Completed:   Not Applicable    PLAN: (Patient Tasks / Therapist Tasks / Other)  Patient will begin setting boundaries that Wednesdays and Thursdays are his days to clean the house  Patient will spend time with cat and his parents as a way to take valued action        SAURABH Domínguez                                                         ______________________________________________________________________    Treatment Plan    Patient's Name: Steve Morgan  YOB: 1960    Date: 4/30/2021    DSM5 Diagnoses: 296.32 (F33.1) Major Depressive Disorder, Recurrent Episode, Moderate With melancholic features  Psychosocial / Contextual Factors: Individual Factors Patient reports that he has family friend's but otherwise is socially isolated, Community Factors Patient reports that he was volunteering, but has been unable to due to finanical reasons and Health- Seeking Factors Patient reports that neuropathy from diabetes has made it so that he cannot work and creates other difficulties for him.    WHODAS: 20    Referral / Collaboration:  Referral to another professional/service is not indicated at this time..    Anticipated number of session or this episode of care:  30-40      MeasurableTreatment Goal(s) related to diagnosis / functional impairment(s)  Goal 1: Patient will manage my anger when I am driving    I will know I've met my goal when I am a calmer drive and would be able to let go of a situation that I don't agree with.      Objective #A (Patient Action)    Patient will use progressive relaxation exercise once in the morning and once in the evening to help relieve tension  practice deep diaphragm breathing once daily for at least 5 minutes to reduce anger / irritability and regain a calmer, more clear state of mind.  Status: Completed - Date: 2/3/2021     Intervention(s)  Therapist will assign homework to engage in progressive musle relaxation or breathing depending on patient preference  teach teach breathing or progressive muscle relaxation.    Objective #B  Patient will identify patterns of escalation  (i.e. tightness in chest, flushed face, increased heart rate, clenched hands, etc.).  Status: Completed - Date: 2/3/2021     Intervention(s)  Therapist will assign homework to engage in using skills to help with reducing anger  role-play using skills in the moment  teach cognitive distortions.    Objective #C  Patient will practice one mindfulness skill each day for 5 minutes.  Status: Continued - Date(s):4/30/2021    Intervention(s)  Therapist will assign homework practice mindfulness while driving  teach mindfulness.      Goal 2: Patient will be able to have better communication with his wife    I will know I've met my goal when I am not raising my voice when I am frustrated.      Objective #A (Patient Action)    Status: Continued - Date(s): 4/30/2021     Patient will learn & utilize at least 1 assertive communication skills weekly.    Intervention(s)  Therapist will assign homework to engage in assertive communication skills  role-play assertiveness skills.    Objective #B  Patient will Identify negative self-talk and behaviors: challenge core beliefs, myths, and  actions.    Status: Continued - Date(s): 4/30/2021     Intervention(s)  Therapist will assign homework to engage in cognitive restructuring  teach emotional recognition/identification. and thoughts connected to these emotions.    Objective #C  Patient will identify at least 3 alternative response(s) to aggressive behaviors.  Status: Continued - Date(s): 4/30/2021     Intervention(s)  Therapist will assign homework to engage in skills that can reduce vulnerability  provide support to engage in coping skills when feeling frustrated.      Goal 3: Patient will not procrastinate    I will know I've met my goal when I am up and doing things and when I see something that needs to be done I do it (cleaning litter boxes, taking out the garbage and taking care of mail ).      Objective #A (Patient Action)    Status: Completed - Date: 2/3/2021     Patient will identify 3-5 fears / thoughts that contribute to feeling anxious.    Intervention(s)  Therapist will assign homework to pay attention to thoughts and feelings when procrastinating  provide education on the connection of thoughts and feelings related to procrastination.    Objective #B  Patient will Increase interest, engagement, and pleasure in doing things.    Status: Completed - Date: 2/3/2021     Intervention(s)  Therapist will assign homework to engage in a daily schedule  teach how to improve motivation.    Objective #C  Patient will Feel less tired and more energy during the day .  Status: Continued - Date(s): 4/30/2021     Intervention(s)  Therapist will assign homework to engage in activity to increase motivation by gaging amount of energy activities take  teach improving motivation skills by gaging energy level of activities.      Patient has reviewed and agreed to the above plan.      Veronica Chavis, [facility]  April 30, 2021

## 2021-05-18 DIAGNOSIS — N40.1 BENIGN PROSTATIC HYPERPLASIA WITH URINARY HESITANCY: ICD-10-CM

## 2021-05-18 DIAGNOSIS — R39.11 BENIGN PROSTATIC HYPERPLASIA WITH URINARY HESITANCY: ICD-10-CM

## 2021-05-19 RX ORDER — FINASTERIDE 5 MG/1
TABLET, FILM COATED ORAL
Qty: 90 TABLET | Refills: 1 | Status: SHIPPED | OUTPATIENT
Start: 2021-05-19 | End: 2022-05-27

## 2021-05-24 ENCOUNTER — HOSPITAL ENCOUNTER (OUTPATIENT)
Dept: WOUND CARE | Facility: CLINIC | Age: 61
Discharge: HOME | End: 2021-05-24
Attending: SURGERY | Admitting: SURGERY
Payer: MEDICARE

## 2021-05-24 VITALS
SYSTOLIC BLOOD PRESSURE: 157 MMHG | TEMPERATURE: 97.9 F | HEIGHT: 65 IN | WEIGHT: 246.2 LBS | BODY MASS INDEX: 41.02 KG/M2 | RESPIRATION RATE: 16 BRPM | HEART RATE: 73 BPM | DIASTOLIC BLOOD PRESSURE: 75 MMHG

## 2021-05-24 DIAGNOSIS — L98.499 ULCER OF FINGER, UNSPECIFIED ULCER STAGE (H): ICD-10-CM

## 2021-05-24 DIAGNOSIS — S61.212A: ICD-10-CM

## 2021-05-24 PROCEDURE — 11042 DBRDMT SUBQ TIS 1ST 20SQCM/<: CPT | Performed by: SURGERY

## 2021-05-24 PROCEDURE — 11042 DBRDMT SUBQ TIS 1ST 20SQCM/<: CPT

## 2021-05-24 PROCEDURE — 93923 UPR/LXTR ART STDY 3+ LVLS: CPT | Mod: GZ

## 2021-05-24 PROCEDURE — 99203 OFFICE O/P NEW LOW 30 MIN: CPT | Mod: 25 | Performed by: SURGERY

## 2021-05-24 PROCEDURE — G0463 HOSPITAL OUTPT CLINIC VISIT: HCPCS | Mod: 25

## 2021-05-24 ASSESSMENT — MIFFLIN-ST. JEOR: SCORE: 1838.64

## 2021-05-24 NOTE — PROGRESS NOTES
[facility] [place] Progress Note    Subject: Steve Morgan was referred by Ignacio VÁZQUEZ about a chronic right dorsal distal third digit ulcer.  This 61-year-old patient with somewhat uncontrolled diabetes mellitus has peripheral neuropathy involving his fingers and toes.  Proximally 5 months ago he is opening a cat food can and apparently cut his finger on the lid.  He has been treating this since that time and the wound is failed to heal.  Presently he is applying Bactroban to the area.  Is recommended he see us in the wound clinic for evaluation.  He has had no significant infection requiring oral or intravenous antibiotics.  Bactroban was initiated a week ago but he is not necessarily doing it on a regular basis.    PMH:   Past Medical History:   Diagnosis Date     Cellulitis and abscess of trunk 6/27/2017     Depression      Hyperlipidemia LDL goal <100 10/31/2010     Hypertension goal BP (blood pressure) < 140/90 3/17/2011     Hypothyroidism 1/12/2010     Morbid obesity due to excess calories (H) 1/12/2010     Neuropathy in diabetes (H) 1/12/2010     Obesity 1/12/2010     Sleep apnea 1/12/2010    CPAP     Type 2 diabetes, HbA1C goal < 8% (H) 3/8/2011     Patient Active Problem List   Diagnosis     Morbid obesity due to excess calories (H)     Sleep apnea     Neuropathy in diabetes (H)     Hypothyroidism     HYPERLIPIDEMIA LDL GOAL <100     Essential hypertension with goal blood pressure less than 140/90     Peripheral arterial disease (H)     Tremor     Peripheral neuropathy     Generalized muscle weakness     Health Care Home     Unsteady gait     DAMIÁN (obstructive sleep apnea)     Vitamin B12 deficiency without anemia     Chronic hyponatremia     Type 2 diabetes mellitus with diabetic neuropathy (H)     Mild episode of recurrent major depressive disorder (H)     Cellulitis and abscess of trunk     Benign prostatic hyperplasia with urinary hesitancy     Chronic left shoulder pain      Social Hx:   Social History     Socioeconomic History     Marital status:      Spouse name: Sri     Number of children: 2     Years of education: Not on file     Highest education level: Associate degree: occupational, technical, or vocational program   Occupational History     Occupation:      Employer: NONE      Comment: [employer]   Social Needs     Financial resource strain: Not hard at all     Food insecurity     Worry: Never true     Inability: Never true     Transportation needs     Medical: No     Non-medical: No   Tobacco Use     Smoking status: Never Smoker     Smokeless tobacco: Never Used   Substance and Sexual Activity     Alcohol use: Yes     Frequency: Monthly or less     Drinks per session: 1 or 2     Binge frequency: Never     Comment: Occassionally     Drug use: No     Sexual activity: Not Currently     Partners: Female     Birth control/protection: Abstinence   Lifestyle     Physical activity     Days per week: 0 days     Minutes per session: 0 min     Stress: Not at all   Relationships     Social connections     Talks on phone: Twice a week     Gets together: Not on file     Attends Sikhism service: Never     Active member of club or organization: Yes     Attends meetings of clubs or organizations: 1 to 4 times per year     Relationship status:      Intimate partner violence     Fear of current or ex partner: Not on file     Emotionally abused: Not on file     Physically abused: Not on file     Forced sexual activity: Not on file   Other Topics Concern     Parent/sibling w/ CABG, MI or angioplasty before 65F 55M? No   Social History Narrative     Not on file       Surgical Hx:   Past Surgical History:   Procedure Laterality Date     BIOPSY       COLONOSCOPY       EYE SURGERY       GENITOURINARY SURGERY      surg for undescended testicle     REPAIR HAMMER TOE BILATERAL  5/16/2013    Procedure: REPAIR HAMMER TOE BILATERAL;  Flexor Tenotomy Toes 2,3,4,5 Bilateral  Feet;  Surgeon: Saad Bangura DPM;  Location: [facility] OR       Allergies:  No Known Allergies    Medications:   Current Outpatient Medications   Medication     ammonium lactate (AMLACTIN) 12 % external cream     ammonium lactate (LAC-HYDRIN) 12 % cream     ASPIRIN 81 MG OR TABS     atenolol (TENORMIN) 25 MG tablet     buPROPion (WELLBUTRIN XL) 300 MG 24 hr tablet     Calcium Carb-Cholecalciferol (CALCIUM 600 + D PO)     citalopram (CELEXA) 40 MG tablet     Continuous Blood Gluc  (FREESTYLE GIULIANA 14 DAY READER) SIENA     Continuous Blood Gluc Sensor (FREESTYLE GIULIANA 14 DAY SENSOR) MISC     Cyanocobalamin (VITAMIN B 12 PO)     famotidine (PEPCID) 40 MG tablet     ferrous sulfate 325 (65 FE) MG tablet     finasteride (PROSCAR) 5 MG tablet     glipiZIDE (GLUCOTROL XL) 10 MG 24 hr tablet     hydrocortisone (WESTCORT) 0.2 % cream     insulin glargine (BASAGLAR KWIKPEN) 100 UNIT/ML pen     insulin pen needle (B-D U/F) 31G X 5 MM miscellaneous     LEVOXYL 137 MCG tablet     losartan (COZAAR) 50 MG tablet     metFORMIN (GLUCOPHAGE) 1000 MG tablet     MULTI-VITAMIN OR TABS     mupirocin (BACTROBAN) 2 % external ointment     mupirocin (BACTROBAN) 2 % external ointment     NOVOLOG FLEXPEN 100 UNIT/ML soln     nystatin (MYCOSTATIN) 899563 UNIT/GM external cream     pantoprazole (PROTONIX) 40 MG EC tablet     simvastatin (ZOCOR) 20 MG tablet     VITAMIN D, CHOLECALCIFEROL, PO     No current facility-administered medications for this encounter.        Labs:   Recent Labs   Lab Test 02/22/21  1056 01/16/20  0942 01/16/20  0942   ALBUMIN  --   --  3.7   HGB  --   --  11.9*   WBC  --   --  8.1   A1C 7.4*   < >  --     < > = values in this interval not displayed.     Creatinine   Date Value Ref Range Status   01/16/2020 1.01 0.66 - 1.25 mg/dL Final     GFR Estimate   Date Value Ref Range Status   01/16/2020 79 >60 mL/min/[1.73_m2] Final     Comment:     Non  GFR Calc  Starting 12/18/2018, serum creatinine based  "estimated GFR (eGFR) will be   calculated using the Chronic Kidney Disease Epidemiology Collaboration   (CKD-EPI) equation.       GFR Estimate If Black   Date Value Ref Range Status   01/16/2020 >90 >60 mL/min/[1.73_m2] Final     Comment:      GFR Calc  Starting 12/18/2018, serum creatinine based estimated GFR (eGFR) will be   calculated using the Chronic Kidney Disease Epidemiology Collaboration   (CKD-EPI) equation.       WBC   Date Value Ref Range Status   01/16/2020 8.1 4.0 - 11.0 10e9/L Final     Lab Results   Component Value Date    CR 1.01 01/16/2020      ROS: Diabetes for approximately 12 to 13 years.  He does not check his blood sugars at home though he knows he needs to.  Lives independently.  Does use a cane in his left hand that due to the diabetic peripheral neuropathy.  Recent eye exam revealed no retinopathy.    Diabetes is poorly controlled.  2/22/2021 A1c= 7.4 and 3/12/2020 A1c= 9.9.   3/5/2020 LDL= 88  Non-smoker.  Lives independently.    Nutrition requirements were discussed with patient today.  Objective:  BP (!) 157/75   Pulse 73   Temp 97.9  F (36.6  C) (Temporal)   Resp 16   Ht 1.651 m (5' 5\")   Wt 111.7 kg (246 lb 3.2 oz)   BMI 40.97 kg/m    Wound (used by OP [facility] only) 05/24/21 0756 Right (Active)   Dressing Appearance moist drainage 05/24/21 0700   Length (cm) 0.3 05/24/21 0700   Width (cm) 2.5 05/24/21 0700   Depth (cm) 0.2 05/24/21 0700   Wound (cm^2) 0.75 cm^2 05/24/21 0700   Wound Volume (cm^3) 0.15 cm^3 05/24/21 0700   Drainage Characteristics/Odor serosanguineous 05/24/21 0700   Drainage Amount moderate 05/24/21 0700        General:  Patient is alert and orientated, no acute distress.  Glasses.  Very conversant.  Using a cane.  HEENT= no carotid bruits  Chest= clear to auscultation  Cardiovascular= regular rate with no murmur    Vascular: +3 radial/ulnar pulses bilaterally and +3 DP pulses bilaterally.   Bedside Doppler with strong biphasic to triphasic signals in " both right distal radial/ulnar arteries   Good strong Doppler signals in the ulnar and radial aspects of the right third digital arteries.  Good flow and palmar arch to right hand.    Extremities: Peripheral neuropathy of feet and fingers.  No ulcers on the feet.  No edema.    Callus over the dorsal aspect of mid second digit     Ulceration over the right dorsal distal crease DIP.  Callus noted.  Fibrin noted.  No bone or                              tendon exposed. No cellulitis.  Can plantar flex mid and distal PIP joints        Procedure:   Patient was determined to be capable of making their own medical decisions and informed consent was obtained, topical anesthetic of 4% lidocaine was applied, debridement was performed.  Patient does have peripheral neuropathy best tolerated debridement well.  Using a #15 blade scalpel a full-thickness/subcutaneous excisional debridement was performed a millimeter ulcer over the finger.  No deep structures involved.  Excellent bleeding.  No undermining.  Was used to debride ulcer down to and including subcutaneous tissue. Bleeding controlled with light pressure. Patient tolerated procedure well.    Impression: Nonhealing right third DIPJ dorsal ulcer.  Prior to this may be due to ongoing flexion which prevents the healing.  Wound is been debrided today.  We will continue with Bactroban on a daily basis.  He will be fit for a finger splint to prevent flexion of the DIP which may aid in healing.     Excellent arterial blood supply.   Poorly controlled diabetes.  Discussed the importance of controlling this not just for wound healing but for overall health.  Barriers to healing include: Diabetes, proper offloading, smoking, nutrition. Patient education provided on each.   Plan:  We will dress the wounds with Bactroban/finger splint.  Patient will return to the clinic in 2 weeks time.     Kapil Holley on 5/24/2021 at 8:11 AM

## 2021-05-24 NOTE — DISCHARGE INSTRUCTIONS
[facility]  [address] [address]    Call us at [phone] if you have any questions about your wounds, have redness or swelling around your wound, have a fever of 101 or greater or if you have any other problems or concerns. We answer the phone Monday through Friday 8 am to 4 pm, please leave a message as we check the voicemail frequently throughout the day.     Steve Morgan      1960    Wound care recommendations to right third finger wound  Wash with soap and water. Apply Mupirocin ointment to wound bed, cover with bandaid and change daily    Wear a finger splint at all times. These are over the counter at [facility].     Kapil Holley M.D.. May 24, 2021    Wound Clinic follow up as scheduled    If you had a positive experience please indicate that on your patient satisfaction survey form that [facility] will be sending you.    It was a pleasure meeting with you today.  Thank you for allowing me and my team the privilege of caring for you today.  YOU are the reason we are here, and I truly hope we provided you with the excellent service you deserve.  Please let us know if there is anything else we can do for you so that we can be sure you are leaving completely satisfied with your care experience.      If you have any billing related questions please call the [facility] Business office at [phone]. The clinic staff does not handle billing related matters.

## 2021-05-24 NOTE — PROGRESS NOTES
Patient arrived for wound care visit. Certified Wound Care Nurse time spent evaluating patient record, completed a full evaluation and documented wound(s) & qasim-wound skin; provided recommendation based on treatment plan. Applied dressing, reviewed discharge instructions, patient education, and discussed plan of care with appropriate medical team staff members and patient and/or family members.

## 2021-05-27 ENCOUNTER — VIRTUAL VISIT (OUTPATIENT)
Dept: PSYCHOLOGY | Facility: CLINIC | Age: 61
End: 2021-05-27
Payer: MEDICARE

## 2021-05-27 ENCOUNTER — TELEPHONE (OUTPATIENT)
Dept: FAMILY MEDICINE | Facility: CLINIC | Age: 61
End: 2021-05-27

## 2021-05-27 DIAGNOSIS — F33.1 MODERATE EPISODE OF RECURRENT MAJOR DEPRESSIVE DISORDER (H): Primary | ICD-10-CM

## 2021-05-27 PROCEDURE — 90834 PSYTX W PT 45 MINUTES: CPT | Mod: 95 | Performed by: SOCIAL WORKER
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
## 2021-06-01 ENCOUNTER — TELEPHONE (OUTPATIENT)
Dept: ENDOCRINOLOGY | Facility: CLINIC | Age: 61
End: 2021-06-01

## 2021-06-01 NOTE — TELEPHONE ENCOUNTER
[facility] for [facility] Therapeutic Shoes for Medicare.    LAST OFFICE/VIRTUAL VISIT:  02/25/21    FUTURE OFFICE/VIRTUAL VISIT:  None    Lab Results   Component Value Date    A1C 7.4 02/22/2021    A1C 9.9 03/12/2020    A1C 11.7 12/05/2019    A1C 11.6 08/29/2019    A1C 11.8 12/14/2018         Anisha Diop CMA  [facility]  [place]

## 2021-06-02 ENCOUNTER — TELEPHONE (OUTPATIENT)
Dept: PODIATRY | Facility: CLINIC | Age: 61
End: 2021-06-02

## 2021-06-02 DIAGNOSIS — E11.40 TYPE 2 DIABETES MELLITUS WITH DIABETIC NEUROPATHY, WITH LONG-TERM CURRENT USE OF INSULIN (H): ICD-10-CM

## 2021-06-02 DIAGNOSIS — Z79.4 TYPE 2 DIABETES MELLITUS WITH DIABETIC NEUROPATHY, WITH LONG-TERM CURRENT USE OF INSULIN (H): ICD-10-CM

## 2021-06-02 NOTE — TELEPHONE ENCOUNTER
Fax received from [facility] for additional diabetic shoes and inserts. Placed for provider review. Completed form to be faxed back at [phone].    Grace Manriquez CMA on 6/2/2021 at 8:04 AM

## 2021-06-03 ENCOUNTER — MEDICAL CORRESPONDENCE (OUTPATIENT)
Dept: HEALTH INFORMATION MANAGEMENT | Facility: CLINIC | Age: 61
End: 2021-06-03

## 2021-06-03 NOTE — TELEPHONE ENCOUNTER
Forms/paperwork reviewed, completed and signed.  Please fax or send the papers as requested, document in chart and close the encounter.    Thank you.    Cindi Meraz MD

## 2021-06-03 NOTE — TELEPHONE ENCOUNTER
Form was faxed and sent to scanning.      Anisha Diop, [facility]  [place]

## 2021-06-03 NOTE — TELEPHONE ENCOUNTER
Form signed, will fax today.    Eleazar Zafar DPM [facility]  Podiatric Foot & Ankle Surgeon  [facility]  [phone]

## 2021-06-04 RX ORDER — INSULIN ASPART 100 [IU]/ML
INJECTION, SOLUTION INTRAVENOUS; SUBCUTANEOUS
Qty: 45 ML | Refills: 3 | Status: ON HOLD | OUTPATIENT
Start: 2021-06-04 | End: 2022-12-23

## 2021-06-05 ENCOUNTER — HEALTH MAINTENANCE LETTER (OUTPATIENT)
Age: 61
End: 2021-06-05

## 2021-06-15 ENCOUNTER — HOSPITAL ENCOUNTER (OUTPATIENT)
Dept: WOUND CARE | Facility: CLINIC | Age: 61
Discharge: HOME | End: 2021-06-15
Attending: PHYSICIAN ASSISTANT | Admitting: PHYSICIAN ASSISTANT
Payer: MEDICARE

## 2021-06-15 VITALS — HEART RATE: 67 BPM | TEMPERATURE: 96 F | DIASTOLIC BLOOD PRESSURE: 77 MMHG | SYSTOLIC BLOOD PRESSURE: 150 MMHG

## 2021-06-15 DIAGNOSIS — S61.212A: ICD-10-CM

## 2021-06-15 PROCEDURE — 97602 WOUND(S) CARE NON-SELECTIVE: CPT

## 2021-06-15 PROCEDURE — 99214 OFFICE O/P EST MOD 30 MIN: CPT | Performed by: PHYSICIAN ASSISTANT
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
## 2021-06-15 NOTE — DISCHARGE INSTRUCTIONS
[facility]  [address] [place] [address] [facility] [address]    Call us at [phone] if you have any questions about your wounds, have redness or swelling around your wound, have a fever of 101 or greater or if you have any other problems or concerns. We answer the phone Monday through Friday 8 am to 4 pm, please leave a message as we check the voicemail frequently throughout the day.     Steve Morgan      1960    Wound care recommendations to right third finger  Wash hands with soap and water. Apply Silvercel to wound bed, hold in place with waterproof tape and change 1-2 times a day.     Paulina Abarca PA-C. Inessa 15, 2021    Wound Clinic follow up as scheduled    If you had a positive experience please indicate that on your patient satisfaction survey form that [facility] will be sending you.    It was a pleasure meeting with you today.  Thank you for allowing me and my team the privilege of caring for you today.  YOU are the reason we are here, and I truly hope we provided you with the excellent service you deserve.  Please let us know if there is anything else we can do for you so that we can be sure you are leaving completely satisfied with your care experience.      If you have any billing related questions please call the [facility] Business office at [phone]. The clinic staff does not handle billing related matters.

## 2021-06-17 ENCOUNTER — VIRTUAL VISIT (OUTPATIENT)
Dept: PSYCHOLOGY | Facility: CLINIC | Age: 61
End: 2021-06-17
Payer: MEDICARE

## 2021-06-17 DIAGNOSIS — F33.0 MILD EPISODE OF RECURRENT MAJOR DEPRESSIVE DISORDER (H): Primary | ICD-10-CM

## 2021-06-17 PROCEDURE — 90834 PSYTX W PT 45 MINUTES: CPT | Mod: 95 | Performed by: SOCIAL WORKER

## 2021-06-17 NOTE — PROGRESS NOTES
Progress Note    Patient Name: Steve Morgan  Date: 6/17/2021         Service Type: Individual      Session Start Time: 14:30  Session End Time: 15:22     Session Length: 52    Session #: 19    Attendees: Client attended alone    Service Modality:  Video Visit:    Telemedicine Visit: The patient's condition can be safely assessed and treated via synchronous audio and visual telemedicine encounter.      Reason for Telemedicine Visit: Services only offered telehealth    Originating Site (Patient Location): Patient's home    Distant Site (Provider Location): Provider Remote Setting    Consent:  The patient/guardian has verbally consented to: the potential risks and benefits of telemedicine (video visit) versus in person care; bill my insurance or make self-payment for services provided; and responsibility for payment of non-covered services.     Patient would like the video invitation sent by: Send to e-mail at: [email or web address]    Mode of Communication:  Video Conference via [employer]    As the provider I attest to compliance with applicable laws and regulations related to telemedicine.     Treatment Plan Last Reviewed: 4/30/2021  PHQ-9 / JOSE MANUEL-7 :   PHQ 4/15/2021 5/26/2021 6/16/2021   PHQ-9 Total Score 14 16 7   Q9: Thoughts of better off dead/self-harm past 2 weeks Several days Not at all Not at all     JOSE MANUEL-7 SCORE 10/14/2020 5/26/2021 6/16/2021   Total Score - - -   Total Score 4 (minimal anxiety) 2 (minimal anxiety) 2 (minimal anxiety)   Total Score 4 2 2       DATA  Interactive Complexity: No  Crisis: No       Progress Since Last Session (Related to Symptoms / Goals / Homework):   Symptoms: No change Patient noting struggles with motivation    Homework: Achieved / completed to satisfaction       Episode of Care Goals: Achieved / completed to satisfaction - PREPARATION (Decided to change - considering how); Intervened by negotiating a change plan and determining options /  "strategies for behavior change, identifying triggers, exploring social supports, and working towards setting a date to begin behavior change  Satisfactory progress - ACTION (Actively working towards change); Intervened by reinforcing change plan / affirming steps taken      Current / Ongoing Stressors and Concerns:   Patient reports stressors of getting angry while driving and that this has caused him and his wife to experience a frightening situation with another .  Patient reports that he becomes mad with the people that he sees most in his life, his parents, his son, his wife and his cat.  He also notes another concern that causes stress in this area of his life is his procrastination and that he spends most of his day \"on the couch\".       Treatment Objective(s) Addressed in This Session:   Increase interest, engagement, and pleasure in doing things  learn & utilize at least 1 assertive communication skills weekly     Intervention:   ACT: Patient reports that he has been able to engage in some activities but not fully to where he wants to be yet.  Patient asked about what it means to retire and sit back and relax from another's point of view versus what he might think it means.  Patient wanted to discuss communication concerns with his wife that are causing him to feel upset.  Patient worked with writer on what health effective communication can look like and how he might address issues with his wife.       ASSESSMENT: Current Emotional / Mental Status (status of significant symptoms):   Risk status (Self / Other harm or suicidal ideation)   Patient denies current fears or concerns for personal safety.   Patient denies current or recent suicidal ideation or behaviors.   Patient denies current or recent homicidal ideation or behaviors.   Patient denies current or recent self injurious behavior or ideation.   Patient denies other safety concerns.   Patient reports there has been no change in risk factors " since their last session.     Patient reports there has been no change in protective factors since their last session.     Recommended that patient call 911 or go to the local ED should there be a change in any of these risk factors.     Appearance:   Appropriate    Eye Contact:   Good    Psychomotor Behavior: Normal    Attitude:   Cooperative  Interested Friendly   Orientation:   All   Speech    Rate / Production: Normal/ Responsive    Volume:  Normal    Mood:    frustrated   Affect:    Appropriate    Thought Content:  Clear  Rumination    Thought Form:  Coherent  Logical    Insight:    Good  and Fair      Medication Review:   No changes to current psychiatric medication(s)     Medication Compliance:   Yes     Changes in Health Issues:   None reported     Chemical Use Review:   Substance Use: Chemical use reviewed, no active concerns identified      Tobacco Use: No current tobacco use.      Diagnosis:  1. Mild episode of recurrent major depressive disorder (H)        Collateral Reports Completed:   Not Applicable    PLAN: (Patient Tasks / Therapist Tasks / Other)  Patient will talk to his wife about support to engage in activities and concerns about issues she brings up about him to others        Veronica Chavis, SAURABH                                                         ______________________________________________________________________    Treatment Plan    Patient's Name: Steve Morgan  YOB: 1960    Date: 4/30/2021    DSM5 Diagnoses: 296.32 (F33.1) Major Depressive Disorder, Recurrent Episode, Moderate With melancholic features  Psychosocial / Contextual Factors: Individual Factors Patient reports that he has family friend's but otherwise is socially isolated, Community Factors Patient reports that he was volunteering, but has been unable to due to finanical reasons and Health- Seeking Factors Patient reports that neuropathy from diabetes has made it so that he cannot work and creates  other difficulties for him.    WHODAS: 20    Referral / Collaboration:  Referral to another professional/service is not indicated at this time..    Anticipated number of session or this episode of care: 30-40      MeasurableTreatment Goal(s) related to diagnosis / functional impairment(s)  Goal 1: Patient will manage my anger when I am driving    I will know I've met my goal when I am a calmer drive and would be able to let go of a situation that I don't agree with.      Objective #A (Patient Action)    Patient will use progressive relaxation exercise once in the morning and once in the evening to help relieve tension  practice deep diaphragm breathing once daily for at least 5 minutes to reduce anger / irritability and regain a calmer, more clear state of mind.  Status: Completed - Date: 2/3/2021     Intervention(s)  Therapist will assign homework to engage in progressive musle relaxation or breathing depending on patient preference  teach teach breathing or progressive muscle relaxation.    Objective #B  Patient will identify patterns of escalation  (i.e. tightness in chest, flushed face, increased heart rate, clenched hands, etc.).  Status: Completed - Date: 2/3/2021     Intervention(s)  Therapist will assign homework to engage in using skills to help with reducing anger  role-play using skills in the moment  teach cognitive distortions.    Objective #C  Patient will practice one mindfulness skill each day for 5 minutes.  Status: Continued - Date(s):4/30/2021    Intervention(s)  Therapist will assign homework practice mindfulness while driving  teach mindfulness.      Goal 2: Patient will be able to have better communication with his wife    I will know I've met my goal when I am not raising my voice when I am frustrated.      Objective #A (Patient Action)    Status: Continued - Date(s): 4/30/2021     Patient will learn & utilize at least 1 assertive communication skills weekly.    Intervention(s)  Therapist will  assign homework to engage in assertive communication skills  role-play assertiveness skills.    Objective #B  Patient will Identify negative self-talk and behaviors: challenge core beliefs, myths, and actions.    Status: Continued - Date(s): 4/30/2021     Intervention(s)  Therapist will assign homework to engage in cognitive restructuring  teach emotional recognition/identification. and thoughts connected to these emotions.    Objective #C  Patient will identify at least 3 alternative response(s) to aggressive behaviors.  Status: Continued - Date(s): 4/30/2021     Intervention(s)  Therapist will assign homework to engage in skills that can reduce vulnerability  provide support to engage in coping skills when feeling frustrated.      Goal 3: Patient will not procrastinate    I will know I've met my goal when I am up and doing things and when I see something that needs to be done I do it (cleaning litter boxes, taking out the garbage and taking care of mail ).      Objective #A (Patient Action)    Status: Completed - Date: 2/3/2021     Patient will identify 3-5 fears / thoughts that contribute to feeling anxious.    Intervention(s)  Therapist will assign homework to pay attention to thoughts and feelings when procrastinating  provide education on the connection of thoughts and feelings related to procrastination.    Objective #B  Patient will Increase interest, engagement, and pleasure in doing things.    Status: Completed - Date: 2/3/2021     Intervention(s)  Therapist will assign homework to engage in a daily schedule  teach how to improve motivation.    Objective #C  Patient will Feel less tired and more energy during the day .  Status: Continued - Date(s): 4/30/2021     Intervention(s)  Therapist will assign homework to engage in activity to increase motivation by gaging amount of energy activities take  teach improving motivation skills by gaging energy level of activities.      Patient has reviewed and agreed to  the above plan.      Veronica Chavis, [facility]  April 30, 2021

## 2021-06-22 ENCOUNTER — HOSPITAL ENCOUNTER (OUTPATIENT)
Dept: CARDIOLOGY | Facility: CLINIC | Age: 61
Discharge: HOME | End: 2021-06-22
Attending: PHYSICIAN ASSISTANT | Admitting: PHYSICIAN ASSISTANT
Payer: MEDICARE

## 2021-06-22 VITALS — HEIGHT: 65 IN | BODY MASS INDEX: 41.65 KG/M2 | WEIGHT: 250 LBS

## 2021-06-22 DIAGNOSIS — R07.9 EXERTIONAL CHEST PAIN: ICD-10-CM

## 2021-06-22 PROCEDURE — 93325 DOPPLER ECHO COLOR FLOW MAPG: CPT | Mod: 26 | Performed by: INTERNAL MEDICINE

## 2021-06-22 PROCEDURE — 250N000011 HC RX IP 250 OP 636

## 2021-06-22 PROCEDURE — 255N000002 HC RX 255 OP 636: Performed by: PHYSICIAN ASSISTANT

## 2021-06-22 PROCEDURE — 93350 STRESS TTE ONLY: CPT | Mod: 26 | Performed by: INTERNAL MEDICINE

## 2021-06-22 PROCEDURE — 258N000003 HC RX IP 258 OP 636: Performed by: PHYSICIAN ASSISTANT

## 2021-06-22 PROCEDURE — 250N000009 HC RX 250: Performed by: PHYSICIAN ASSISTANT

## 2021-06-22 PROCEDURE — 93325 DOPPLER ECHO COLOR FLOW MAPG: CPT | Mod: TC

## 2021-06-22 PROCEDURE — 93016 CV STRESS TEST SUPVJ ONLY: CPT | Performed by: INTERNAL MEDICINE

## 2021-06-22 PROCEDURE — 93321 DOPPLER ECHO F-UP/LMTD STD: CPT | Mod: 26 | Performed by: INTERNAL MEDICINE

## 2021-06-22 PROCEDURE — 93018 CV STRESS TEST I&R ONLY: CPT | Performed by: INTERNAL MEDICINE

## 2021-06-22 RX ORDER — DOBUTAMINE HYDROCHLORIDE 200 MG/100ML
10-50 INJECTION INTRAVENOUS CONTINUOUS
Status: ACTIVE | OUTPATIENT
Start: 2021-06-22 | End: 2021-06-22

## 2021-06-22 RX ORDER — DOBUTAMINE HYDROCHLORIDE 200 MG/100ML
INJECTION INTRAVENOUS
Status: COMPLETED
Start: 2021-06-22 | End: 2021-06-22

## 2021-06-22 RX ORDER — SODIUM CHLORIDE 9 MG/ML
INJECTION, SOLUTION INTRAVENOUS CONTINUOUS
Status: ACTIVE | OUTPATIENT
Start: 2021-06-22 | End: 2021-06-22

## 2021-06-22 RX ORDER — ATROPINE SULFATE 0.1 MG/ML
.2-2 INJECTION INTRAVENOUS
Status: ACTIVE | OUTPATIENT
Start: 2021-06-22 | End: 2021-06-22

## 2021-06-22 RX ORDER — METOPROLOL TARTRATE 1 MG/ML
1-20 INJECTION, SOLUTION INTRAVENOUS
Status: ACTIVE | OUTPATIENT
Start: 2021-06-22 | End: 2021-06-22

## 2021-06-22 RX ADMIN — SODIUM CHLORIDE: 9 INJECTION, SOLUTION INTRAVENOUS at 09:16

## 2021-06-22 RX ADMIN — HUMAN ALBUMIN MICROSPHERES AND PERFLUTREN 3 ML: 10; .22 INJECTION, SOLUTION INTRAVENOUS at 09:15

## 2021-06-22 RX ADMIN — METOPROLOL TARTRATE 5 MG: 5 INJECTION INTRAVENOUS at 08:28

## 2021-06-22 RX ADMIN — DOBUTAMINE HYDROCHLORIDE 500000 MCG: 200 INJECTION INTRAVENOUS at 08:15

## 2021-06-22 ASSESSMENT — MIFFLIN-ST. JEOR: SCORE: 1855.87

## 2021-06-24 ENCOUNTER — PATIENT OUTREACH (OUTPATIENT)
Dept: FAMILY MEDICINE | Facility: CLINIC | Age: 61
End: 2021-06-24

## 2021-06-24 NOTE — TELEPHONE ENCOUNTER
Dr. Fowler   You had responded back to patient regarding normal stress test -   What should the next steps be as he continues with symptoms - no chest pain, however continues with symptoms when his heart rate is elevated, some shortness of breath and then vomits - please advise on recommendation     RN received incoming call from patient     Recent stress test was normal     Patient calls as when he is doing activity, his heart rate rises, he has trouble breathing and then he vomits - with testing normal what should he do?     Marcela Blackburn, Registered Nurse, PAL (Patient Advocate Liason)   [facility]   [phone]

## 2021-06-25 NOTE — TELEPHONE ENCOUNTER
You have not.  You just responded to his results of his stress test since covering provider.  Can set up with a visit next Monday with Stefany VÁZQUEZ if appropriate.    Jeannie Duran RN

## 2021-06-30 ENCOUNTER — HOSPITAL ENCOUNTER (OUTPATIENT)
Dept: WOUND CARE | Facility: CLINIC | Age: 61
End: 2021-06-30
Attending: PHYSICIAN ASSISTANT
Payer: MEDICARE

## 2021-06-30 DIAGNOSIS — S61.212A: ICD-10-CM

## 2021-06-30 PROCEDURE — 99442 PR PHYSICIAN TELEPHONE EVALUATION 11-20 MIN: CPT | Mod: 95 | Performed by: PHYSICIAN ASSISTANT
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
## 2021-06-30 NOTE — DISCHARGE INSTRUCTIONS
[facility]  [address] [address]    Call us at [phone] if you have any questions about your wounds, have redness or swelling around your wound, have a fever of 101 or greater or if you have any other problems or concerns. We answer the phone Monday through Friday 8 am to 4 pm, please leave a message as we check the voicemail frequently throughout the day.     Steve Morgan      1960    Wound care recommendations to right third finger: change daily and as needed when wet  Wash hands with soap and water; pat dry and apply dressing  Silvercel add a few drops of water to moisten and apply to wound bed, hold in place with Band aid or waterproof tape.  Use glove to cover and avoid getting wet.   Use Splint to avoid stretching and splitting the wound reopen.   Referral to Hand Specialist for wound assessment     Paulina Abarca PA-C  June 30, 2021    Return to [facility] as scheduled  If you had a positive experience please indicate on your patient satisfaction survey form that [facility] will be sending you.    If you have any billing related questions please call the [facility] Business office at [phone]. The clinic staff does not handle billing related matters.

## 2021-06-30 NOTE — PROGRESS NOTES
Patient called for a telephone visit. Certified Wound Care Nurse time spent evaluating patient record, completed a full evaluation and documented wound(s) & qasim-wound skin; provided recommendation based on treatment plan. Reviewed discharge instructions, patient education, and discussed plan of care with appropriate medical team staff members and patient and/or family members.     Education provided to pt on moistening the Silvercel and keeping dressing intact for at least 24 hours and changing only when the dressing gets wet.  Pt has been removing the dressing and leaving off for most of the day which allows a scab to form and then it tears open later thru the day causing the wound to bleed and reopen.   Referral made to [facility] for Hand Specialist evaluation.   All concerns and questions addressed.

## 2021-07-01 ENCOUNTER — VIRTUAL VISIT (OUTPATIENT)
Dept: FAMILY MEDICINE | Facility: CLINIC | Age: 61
End: 2021-07-01
Payer: MEDICARE

## 2021-07-01 DIAGNOSIS — Z12.12 SCREENING FOR COLORECTAL CANCER: ICD-10-CM

## 2021-07-01 DIAGNOSIS — K21.00 GASTROESOPHAGEAL REFLUX DISEASE WITH ESOPHAGITIS WITHOUT HEMORRHAGE: Primary | ICD-10-CM

## 2021-07-01 DIAGNOSIS — Z12.11 SCREENING FOR COLORECTAL CANCER: ICD-10-CM

## 2021-07-01 PROCEDURE — 99214 OFFICE O/P EST MOD 30 MIN: CPT | Mod: 95 | Performed by: PHYSICIAN ASSISTANT

## 2021-07-01 ASSESSMENT — PATIENT HEALTH QUESTIONNAIRE - PHQ9
SUM OF ALL RESPONSES TO PHQ QUESTIONS 1-9: 11
SUM OF ALL RESPONSES TO PHQ QUESTIONS 1-9: 11
10. IF YOU CHECKED OFF ANY PROBLEMS, HOW DIFFICULT HAVE THESE PROBLEMS MADE IT FOR YOU TO DO YOUR WORK, TAKE CARE OF THINGS AT HOME, OR GET ALONG WITH OTHER PEOPLE: SOMEWHAT DIFFICULT

## 2021-07-01 ASSESSMENT — ANXIETY QUESTIONNAIRES
1. FEELING NERVOUS, ANXIOUS, OR ON EDGE: NOT AT ALL
GAD7 TOTAL SCORE: 2
GAD7 TOTAL SCORE: 2
7. FEELING AFRAID AS IF SOMETHING AWFUL MIGHT HAPPEN: NOT AT ALL
GAD7 TOTAL SCORE: 2
7. FEELING AFRAID AS IF SOMETHING AWFUL MIGHT HAPPEN: NOT AT ALL
5. BEING SO RESTLESS THAT IT IS HARD TO SIT STILL: NOT AT ALL
2. NOT BEING ABLE TO STOP OR CONTROL WORRYING: NOT AT ALL
6. BECOMING EASILY ANNOYED OR IRRITABLE: MORE THAN HALF THE DAYS
3. WORRYING TOO MUCH ABOUT DIFFERENT THINGS: NOT AT ALL
4. TROUBLE RELAXING: NOT AT ALL

## 2021-07-01 NOTE — PROGRESS NOTES
"Caio is a 61 year old who is being evaluated via a billable video visit.      How would you like to obtain your AVS? MyChart  If the video visit is dropped, the invitation should be resent by: Text to cell phone: [phone]  Will anyone else be joining your video visit? No    Video Start Time: 0923    Assessment & Plan     Gastroesophageal reflux disease with esophagitis without hemorrhage  Uncontrolled with PPI/H2  Recommend consult  - GASTROENTEROLOGY ADULT REF CONSULT ONLY; Future    Screening for colorectal cancer  due  - GASTROENTEROLOGY ADULT REF PROCEDURE ONLY; Future       BMI:   Estimated body mass index is 41.6 kg/m  as calculated from the following:    Height as of 6/22/21: 1.651 m (5' 5\").    Weight as of 6/22/21: 113.4 kg (250 lb).   Return in about 2 weeks (around 7/15/2021) for Lab Work.    ANASTASIA [facility]    Odalis Kearney is a 61 year old who presents for the following health issues     HPI     Patient would like to go over stress test and the next steps  Echo was normal.    Notices acid reflux with exertion and activities. Wonders if this is what he was feeling in chest since echo was normal.  Taking pantoprazole 40 mg and famotidine 40 @ bedtime.      Review of Systems   Constitutional, HEENT, cardiovascular, pulmonary, gi and gu systems are negative, except as otherwise noted.      Objective           Vitals:  No vitals were obtained today due to virtual visit.    Physical Exam   GENERAL: Healthy, alert and no distress  EYES: Eyes grossly normal to inspection.  No discharge or erythema, or obvious scleral/conjunctival abnormalities.  RESP: No audible wheeze, cough, or visible cyanosis.  No visible retractions or increased work of breathing.    SKIN: Visible skin clear. No significant rash, abnormal pigmentation or lesions.  NEURO: Cranial nerves grossly intact.  Mentation and speech appropriate for age.  PSYCH: Mentation appears normal, affect " normal/bright, judgement and insight intact, normal speech and appearance well-groomed.                Video-Visit Details    Type of service:  Video Visit    Video End Time:9:40 AM    Originating Location (pt. Location): Home    Distant Location (provider location):  [facility] [place]     Platform used for Video Visit: [employer]  Answers for HPI/ROS submitted by the patient on 7/1/2021   If you checked off any problems, how difficult have these problems made it for you to do your work, take care of things at home, or get along with other people?: Somewhat difficult  PHQ9 TOTAL SCORE: 11  JOSE MANUEL 7 TOTAL SCORE: 2

## 2021-07-02 ASSESSMENT — PATIENT HEALTH QUESTIONNAIRE - PHQ9: SUM OF ALL RESPONSES TO PHQ QUESTIONS 1-9: 11

## 2021-07-02 ASSESSMENT — ANXIETY QUESTIONNAIRES: GAD7 TOTAL SCORE: 2

## 2021-07-06 ENCOUNTER — TELEPHONE (OUTPATIENT)
Dept: PODIATRY | Facility: CLINIC | Age: 61
End: 2021-07-06
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
## 2021-07-07 ENCOUNTER — OFFICE VISIT (OUTPATIENT)
Dept: PODIATRY | Facility: CLINIC | Age: 61
End: 2021-07-07
Payer: MEDICARE

## 2021-07-07 VITALS
BODY MASS INDEX: 41.15 KG/M2 | HEIGHT: 65 IN | SYSTOLIC BLOOD PRESSURE: 122 MMHG | DIASTOLIC BLOOD PRESSURE: 72 MMHG | WEIGHT: 247 LBS

## 2021-07-07 DIAGNOSIS — T25.221A BLISTERS WITH EPIDERMAL LOSS DUE TO BURN (SECOND DEGREE) OF FOOT, RIGHT, INITIAL ENCOUNTER: ICD-10-CM

## 2021-07-07 DIAGNOSIS — E11.42 DIABETIC POLYNEUROPATHY ASSOCIATED WITH TYPE 2 DIABETES MELLITUS (H): Primary | ICD-10-CM

## 2021-07-07 DIAGNOSIS — T25.222A: ICD-10-CM

## 2021-07-07 PROCEDURE — 11042 DBRDMT SUBQ TIS 1ST 20SQCM/<: CPT | Performed by: PODIATRIST

## 2021-07-07 PROCEDURE — 99214 OFFICE O/P EST MOD 30 MIN: CPT | Mod: 25 | Performed by: PODIATRIST

## 2021-07-07 RX ORDER — DOXYCYCLINE HYCLATE 100 MG
100 TABLET ORAL 2 TIMES DAILY
Qty: 20 TABLET | Refills: 0 | Status: SHIPPED | OUTPATIENT
Start: 2021-07-07 | End: 2021-07-17

## 2021-07-07 ASSESSMENT — MIFFLIN-ST. JEOR: SCORE: 1842.26
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
## 2021-07-07 NOTE — PATIENT INSTRUCTIONS
Thank you for choosing [facility] Podiatry / Foot & Ankle Surgery!    [facility] SCHEDULE SURGERY: [phone]   [address] BILLING QUESTIONS: [phone]   [place] [address] [employer] LINE:[phone]   PH: [phone] APPOINTMENTS: [phone]   FAX: [phone]      Follow up: 1 week     post op shoes and dressing supplies [facility] HOME MEDICAL EQUIPMENT  [facility]  [id number] [place] [address] [id number]  [place] [address]  Ph: [phone]  Fax: [phone]
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
## 2021-07-09 ENCOUNTER — TELEPHONE (OUTPATIENT)
Dept: PODIATRY | Facility: CLINIC | Age: 61
End: 2021-07-09

## 2021-07-09 DIAGNOSIS — E11.42 DIABETIC POLYNEUROPATHY ASSOCIATED WITH TYPE 2 DIABETES MELLITUS (H): Primary | ICD-10-CM

## 2021-07-09 DIAGNOSIS — L97.522 ULCER OF LEFT FOOT WITH FAT LAYER EXPOSED (H): ICD-10-CM

## 2021-07-09 DIAGNOSIS — L97.512 ULCER OF RIGHT FOOT WITH FAT LAYER EXPOSED (H): ICD-10-CM

## 2021-07-09 NOTE — TELEPHONE ENCOUNTER
I can put an order in for home care but not sure what or home many visits insurance will cover.     Please let patient know.     ELISSA GarridoM

## 2021-07-09 NOTE — TELEPHONE ENCOUNTER
Patient needs to stay off the feet. If they would like an order for a wheelchair I can put that into home medical but being on the feet can increase pressure and bleeding to the feet.     If bleeding is getting worse or he develops fever, chills, nausa, streaking redness, recommend that they go to the emergency department.     Not sure where they can get the hospital socks.     Please let them know.     Tena Mathis DPM

## 2021-07-09 NOTE — TELEPHONE ENCOUNTER
Phone call to patient and he asked that writer speak with wife also on speaker phone.   They state when dressing was changed yesterday of the left foot that there was a lot of bleeding that dripped on the towel under his foot. Dark bloody drainage went through 2 layers of a towel and the spot was approximately 3-4 inches in diameter. This morning there was drainage coming through the ACE bandage and she put fresh socks on his feet. Now the sock on his left foot is completely saturated on the bottom and is soaking through to the post op boot/shoe.     They also called [facility] this am to check the status of the dressings. They were supposed to call them back but they have not heard yet and need supplies. Explained that insurance requires different information such as office visit notes, etc, that can delay getting the supplies. Suggested they call them back again to check status.     Wife also asks where they can get more ACE bandages. Suggested [facility] or other pharmacies to get them by until supplies are available.     She asks where they may get the thick hospital socks with . Let her know that we do not carry them in our office as patients usually get them from the hospital.     Will discuss concerns with Dr. Mathis and get back with her.     Please advise if amount of drainage is normal and if patient needs to add additional dressings to daily dressing change or what is recommended.     RAJI Martinez RN

## 2021-07-09 NOTE — TELEPHONE ENCOUNTER
Received the following staff message:    Dechen, Marilin  [facility]             Hi,     Pt would like to know how to get someone to change his dressings daily. Please call pt to discuss possible resources.     TELEPHONE: [phone], Ok to leave VM     Marilin Piña      Phone call to patient. He states his wife is having difficulty being able to change his dressings daily and work. He asks if he could home care to do daily dressing changes. Patient informed that it is up to his insurance and they usually require that the patient is homebound to qualify for home care. He states he spoke with his insurance this morning and they said it would be covered if Dr. Mathis approved it.   He states he is not leaving the house to work, get groceries, etc, as his wife is doing all those things for him. He reports his feet being pretty painful and he is not leaving the house.     Will discuss with provider and get back with him.     Please advise if patient is eligible for home care daily dressing changes.     RAJI Martinez RN

## 2021-07-09 NOTE — TELEPHONE ENCOUNTER
Per Dr. Mathis, if dressings are sticking and pulling at skin with removal, patient may use a wet washcloth to wet the gauze or use a spray bottle with water to help loosen the gauze easier.     Phone call to patient and informed of Dr. Mathis's recommendations below and also regarding bandage removal.  He states he doesn't think a wheel chair will work because they have a 4 level home. He has had to go up and down stairs. He states he has been staying off his feet except to go to the bathroom and for meals. Recommended he try to elevate his feet somewhat while eating to try and avoid pressure to his feet.   Discussed that any increased pressure can cause bleeding.   Normally drainage is clear. If that is excessive, they can use ABD pads obtained over the counter in addition to the other dressings.   Also suggested they could try to obtain the hospital socks from [employer].   Recommended if excessive bleeding continues, to go to the ED.  He verbalized understanding.     RAJI Martinez RN

## 2021-07-09 NOTE — TELEPHONE ENCOUNTER
Called and gave pt home care scheduling number. He would like to speak with Kavitha again regarding bleeding during dressing change today/yesterday.    Chandni Adams CMA

## 2021-07-13 ENCOUNTER — TELEPHONE (OUTPATIENT)
Dept: PODIATRY | Facility: CLINIC | Age: 61
End: 2021-07-13

## 2021-07-13 NOTE — TELEPHONE ENCOUNTER
"I  Do not have any availability this week unfortunately in clinic with being on call.    Recommend to keep follow up with Dr. Zafar on the 19th.     In my note on 7/7/2021, under \"Medical Decision Making\",  It states the quantity of the dressings:      \"We will have them do iodine as the primary dressing to the feet daily and apply secondary dressings of 4 x 4 gauze pads (non sterile) x 4 to each foot daily, a gauze roll ( 4 inch - nonsterile) to each foot daily and  4 inch Ace bandage to each foot daily. \"    So I am not quite sure what more they want or if they even read it but we can refax my not to them. Its all in there.    Tena Mathis, DPM  "

## 2021-07-13 NOTE — TELEPHONE ENCOUNTER
Phone call to [facility] an inquired as to status of wound care orders dated 7/7/21 ordered by Dr. Mathis. She states they faxed a clarification request this am to us. She states billing/  Medicare requires the quantity needed for each item for each dressing change to be listed both on the order as well as the office visit note.     Will look for fax and call them with any further questions.     Faxed received and placed in Dr. Mathis's inbox for when she returns to the office tomorrow.     Patient was also not able to get an appointment with Dr. Mathis this week, and had to schedule with Dr. Zafar on 7/19/21. He asks if Dr. Mathis would be able to see him this week or if it is ok to wait until 7/19/21. Will check into further and get back with him. He has a ride for Wednesday and Thursday.     Please advise.      RAJI Martinez RN

## 2021-07-13 NOTE — TELEPHONE ENCOUNTER
Reason for call:  Patient is wondering why there is a hold up on getting Rx for dressings and other supplies    Phone number to reach patient:  Home number on file [phone] (home)    Best Time:  today    Can we leave a detailed message on this number?  unknown    Phone call to patient and informed that writer spoke with [facility]. Apologized for the delay. Explained that Medicare needs more information and signed by Dr. Mathis. Will have her address it tomorrow when she returns to the office.     He is frustrated that they have to keep buying more supplies. He states the bleeding has stopped and now drainage appears to be light yellow. It does still saturate in a small amount to Ace bandage. He is having less pain and is trying to stay off his feet.     RAJI Martinez RN

## 2021-07-14 ENCOUNTER — TELEPHONE (OUTPATIENT)
Dept: WOUND CARE | Facility: CLINIC | Age: 61
End: 2021-07-14

## 2021-07-14 DIAGNOSIS — S61.212A: ICD-10-CM

## 2021-07-14 DIAGNOSIS — L98.499 ULCER OF FINGER, UNSPECIFIED ULCER STAGE (H): Primary | ICD-10-CM

## 2021-07-14 NOTE — TELEPHONE ENCOUNTER
[facility] Call Center    Phone Message    May a detailed message be left on voicemail: yes     Reason for Call: Other: Zaina from [facility] states that an order needs to be sent again, on the commen section on the order it states IODINE, but Zaina needs the order to state IODINE- size of dressing, and for how many times a day? Insurance wont cover it unless stated. If you could please correct the order and push it through as soon as possible.      Action Taken: Other: Podiatry    Travel Screening: Not Applicable

## 2021-07-14 NOTE — TELEPHONE ENCOUNTER
Left message for patient informing him that wound supply orders were corrected and sent to [facility]. They were sending them through back to the billing department and hopefully getting the order filled soon.   Also let him know that Dr. Mathis does not have any available appointments this week and she recommends he follow up with Dr. Zafar on 7/19/21.   Asked that if there were further questions, to call back and triage number provided.     RAJI Martinez RN

## 2021-07-14 NOTE — TELEPHONE ENCOUNTER
[facility] Wound    Who is the name of the provider?:  Tevin      What is the location you see this provider at?: [place]    Reason for call:  Needs to order [employer].    Can we leave a detailed message on this number?  YES

## 2021-07-14 NOTE — TELEPHONE ENCOUNTER
Walked down to [facility] and spoke with Nallely. They do not carry  Liquid iodine and need clarification.     Discussed with Dr. Mathis. Patient to purchase liquid iodine OTC and dab wounds daily prior to applying the rest of the dressings listed.     Left voicemail for Nallely, [facility] with the above information and informed a corrected order was being faxed. Asked that she call back for questions, and triage extension provided.     RAJI Martinez RN

## 2021-07-14 NOTE — TELEPHONE ENCOUNTER
Ordered corrected and faxed to [facility]: [phone].  Confirmed it went through via [employer].    Phone call to [facility] and left voicemail for Britany stating corrected orders were faxed. Asked that they expedite the order and let us know as soon as possible if they need any further information as Dr. Mathis would be leaving around 11:00 today.   Triage number provided.     RAJI Martinez RN

## 2021-07-14 NOTE — TELEPHONE ENCOUNTER
Walked down to [facility] and spoke to Nallely. She states the order looked good and she had passed it on back to the billing department.     RAJI Martinez RN

## 2021-07-19 ENCOUNTER — OFFICE VISIT (OUTPATIENT)
Dept: PODIATRY | Facility: CLINIC | Age: 61
End: 2021-07-19
Payer: MEDICARE

## 2021-07-19 ENCOUNTER — MEDICAL CORRESPONDENCE (OUTPATIENT)
Dept: HEALTH INFORMATION MANAGEMENT | Facility: CLINIC | Age: 61
End: 2021-07-19

## 2021-07-19 VITALS
BODY MASS INDEX: 41.15 KG/M2 | WEIGHT: 247 LBS | HEIGHT: 65 IN | DIASTOLIC BLOOD PRESSURE: 64 MMHG | SYSTOLIC BLOOD PRESSURE: 118 MMHG

## 2021-07-19 DIAGNOSIS — E11.42 DIABETIC POLYNEUROPATHY ASSOCIATED WITH TYPE 2 DIABETES MELLITUS (H): Primary | ICD-10-CM

## 2021-07-19 DIAGNOSIS — E11.40 TYPE 2 DIABETES MELLITUS WITH DIABETIC NEUROPATHY, UNSPECIFIED WHETHER LONG TERM INSULIN USE (H): ICD-10-CM

## 2021-07-19 PROCEDURE — 99213 OFFICE O/P EST LOW 20 MIN: CPT | Performed by: PODIATRIST

## 2021-07-19 ASSESSMENT — MIFFLIN-ST. JEOR: SCORE: 1842.26
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
## 2021-07-22 ENCOUNTER — TELEPHONE (OUTPATIENT)
Dept: WOUND CARE | Facility: CLINIC | Age: 61
End: 2021-07-22

## 2021-07-22 NOTE — TELEPHONE ENCOUNTER
[facility] Wound    Who is the name of the provider?:  Tevin      What is the location you see this provider at?: [place]    Reason for call:   Re order written on 7/14, order must state dressing size of 4x4.  Once entered, please fax order: [phone]    Can we leave a detailed message on this number?  YES

## 2021-07-26 ENCOUNTER — VIRTUAL VISIT (OUTPATIENT)
Dept: PSYCHOLOGY | Facility: CLINIC | Age: 61
End: 2021-07-26
Payer: MEDICARE

## 2021-07-26 DIAGNOSIS — F33.0 MILD EPISODE OF RECURRENT MAJOR DEPRESSIVE DISORDER (H): Primary | ICD-10-CM

## 2021-07-26 PROCEDURE — 90834 PSYTX W PT 45 MINUTES: CPT | Mod: 95 | Performed by: SOCIAL WORKER

## 2021-07-26 NOTE — PROGRESS NOTES
Progress Note    Patient Name: Steve Morgan  Date: 7/26/2021         Service Type: Individual      Session Start Time: 15:30  Session End Time: 16:22     Session Length: 52    Session #: 20    Attendees: Client attended alone    Service Modality:  Video Visit:    Telemedicine Visit: The patient's condition can be safely assessed and treated via synchronous audio and visual telemedicine encounter.      Reason for Telemedicine Visit: Services only offered telehealth    Originating Site (Patient Location): Patient's home    Distant Site (Provider Location): Provider Remote Setting    Consent:  The patient/guardian has verbally consented to: the potential risks and benefits of telemedicine (video visit) versus in person care; bill my insurance or make self-payment for services provided; and responsibility for payment of non-covered services.     Patient would like the video invitation sent by: Send to e-mail at: [email or web address]    Mode of Communication:  Video Conference via [employer]    As the provider I attest to compliance with applicable laws and regulations related to telemedicine.     Treatment Plan Last Reviewed: 7/26/2021  PHQ-9 / JOSE MANUEL-7 :   PHQ 5/26/2021 6/16/2021 7/1/2021   PHQ-9 Total Score 16 7 11   Q9: Thoughts of better off dead/self-harm past 2 weeks Not at all Not at all Not at all     JOSE MANUEL-7 SCORE 5/26/2021 6/16/2021 7/1/2021   Total Score - - -   Total Score 2 (minimal anxiety) 2 (minimal anxiety) 2 (minimal anxiety)   Total Score 2 2 2       DATA  Interactive Complexity: No  Crisis: No       Progress Since Last Session (Related to Symptoms / Goals / Homework):   Symptoms: Improving reports improvements in relationship with wife    Homework: Did not complete       Episode of Care Goals: Achieved / completed to satisfaction - PREPARATION (Decided to change - considering how); Intervened by negotiating a change plan and determining options / strategies for  "behavior change, identifying triggers, exploring social supports, and working towards setting a date to begin behavior change  Satisfactory progress - ACTION (Actively working towards change); Intervened by reinforcing change plan / affirming steps taken      Current / Ongoing Stressors and Concerns:   Patient reports stressors of getting angry while driving and that this has caused him and his wife to experience a frightening situation with another .  Patient reports that he becomes mad with the people that he sees most in his life, his parents, his son, his wife and his cat.  He also notes another concern that causes stress in this area of his life is his procrastination and that he spends most of his day \"on the couch\".       Treatment Objective(s) Addressed in This Session:   Increase interest, engagement, and pleasure in doing things  learn & utilize at least 1 assertive communication skills weekly     Intervention:   ACT: Discussed with patient what got in the way of talking with his wife and he noted that he could not find the time.  Supported patient with how he could find time to talk with his wife and reviewed what he would want to say.  Patient also discussed concerns that he speaks loudly.  Patient is hesitant to say he is angry and was encouraged to note this feeling and how he can use his breathing to slow down.  Patient agreed he and wife usually get louder and was provided information on how dropping ones voice can help with having a more fruitful conversation.  Patient was interested in this idea and wanted to try it out.       ASSESSMENT: Current Emotional / Mental Status (status of significant symptoms):   Risk status (Self / Other harm or suicidal ideation)   Patient denies current fears or concerns for personal safety.   Patient denies current or recent suicidal ideation or behaviors.   Patient denies current or recent homicidal ideation or behaviors.   Patient denies current or recent " self injurious behavior or ideation.   Patient denies other safety concerns.   Patient reports there has been no change in risk factors since their last session.     Patient reports there has been no change in protective factors since their last session.     Recommended that patient call 911 or go to the local ED should there be a change in any of these risk factors.     Appearance:   Appropriate    Eye Contact:   Good    Psychomotor Behavior: Normal    Attitude:   Cooperative  Interested Friendly   Orientation:   All   Speech    Rate / Production: Normal/ Responsive    Volume:  Normal    Mood:    Euthymic   Affect:    Appropriate    Thought Content:  Clear  Rumination    Thought Form:  Coherent  Logical    Insight:    Good  and Fair      Medication Review:   No changes to current psychiatric medication(s)     Medication Compliance:   Yes     Changes in Health Issues:   None reported     Chemical Use Review:   Substance Use: Chemical use reviewed, no active concerns identified      Tobacco Use: No current tobacco use.      Diagnosis:  1. Mild episode of recurrent major depressive disorder (H)        Collateral Reports Completed:   Not Applicable    PLAN: (Patient Tasks / Therapist Tasks / Other)  Patient will talk with wife after dinner on Tuesday  Patient will try dropping voice when he and his wife begin to fight        SAURABH Domínguez                                                         ______________________________________________________________________    Treatment Plan    Patient's Name: Steve Morgan  YOB: 1960    Date: 4/30/2021    DSM5 Diagnoses: 296.32 (F33.1) Major Depressive Disorder, Recurrent Episode, Moderate With melancholic features  Psychosocial / Contextual Factors: Individual Factors Patient reports that he has family friend's but otherwise is socially isolated, Community Factors Patient reports that he was volunteering, but has been unable to due to finanical  reasons and Health- Seeking Factors Patient reports that neuropathy from diabetes has made it so that he cannot work and creates other difficulties for him.    WHODAS: 20    Referral / Collaboration:  Referral to another professional/service is not indicated at this time..    Anticipated number of session or this episode of care: 30-40      MeasurableTreatment Goal(s) related to diagnosis / functional impairment(s)  Goal 1: Patient will manage my anger when I am driving    I will know I've met my goal when I am a calmer drive and would be able to let go of a situation that I don't agree with.      Objective #A (Patient Action)    Patient will use progressive relaxation exercise once in the morning and once in the evening to help relieve tension  practice deep diaphragm breathing once daily for at least 5 minutes to reduce anger / irritability and regain a calmer, more clear state of mind.  Status: Completed - Date: 2/3/2021     Intervention(s)  Therapist will assign homework to engage in progressive musle relaxation or breathing depending on patient preference  teach teach breathing or progressive muscle relaxation.    Objective #B  Patient will identify patterns of escalation  (i.e. tightness in chest, flushed face, increased heart rate, clenched hands, etc.).  Status: Completed - Date: 2/3/2021     Intervention(s)  Therapist will assign homework to engage in using skills to help with reducing anger  role-play using skills in the moment  teach cognitive distortions.    Objective #C  Patient will practice one mindfulness skill each day for 5 minutes.  Status: Continued - Date(s): 7/26/2021    Intervention(s)  Therapist will assign homework practice mindfulness while driving  teach mindfulness.      Goal 2: Patient will be able to have better communication with his wife    I will know I've met my goal when I am not raising my voice when I am frustrated.      Objective #A (Patient Action)    Status: Continued -  Date(s): 7/26/2021    Patient will learn & utilize at least 1 assertive communication skills weekly.    Intervention(s)  Therapist will assign homework to engage in assertive communication skills  role-play assertiveness skills.    Objective #B  Patient will Identify negative self-talk and behaviors: challenge core beliefs, myths, and actions.    Status: Continued - Date(s): 7/26/2021    Intervention(s)  Therapist will assign homework to engage in cognitive restructuring  teach emotional recognition/identification. and thoughts connected to these emotions.    Objective #C  Patient will identify at least 3 alternative response(s) to aggressive behaviors.  Status: Continued - Date(s): 7/26/2021     Intervention(s)  Therapist will assign homework to engage in skills that can reduce vulnerability  provide support to engage in coping skills when feeling frustrated.      Goal 3: Patient will not procrastinate    I will know I've met my goal when I am up and doing things and when I see something that needs to be done I do it (cleaning litter boxes, taking out the garbage and taking care of mail ).      Objective #A (Patient Action)    Status: Completed - Date: 2/3/2021     Patient will identify 3-5 fears / thoughts that contribute to feeling anxious.    Intervention(s)  Therapist will assign homework to pay attention to thoughts and feelings when procrastinating  provide education on the connection of thoughts and feelings related to procrastination.    Objective #B  Patient will Increase interest, engagement, and pleasure in doing things.    Status: Completed - Date: 2/3/2021     Intervention(s)  Therapist will assign homework to engage in a daily schedule  teach how to improve motivation.    Objective #C  Patient will Feel less tired and more energy during the day .  Status: Continued - Date(s): 7/26/2021    Intervention(s)  Therapist will assign homework to engage in activity to increase motivation by gaging amount of  energy activities take  teach improving motivation skills by gaging energy level of activities.      Patient has reviewed and agreed to the above plan.      Veronica Chavis, [facility]  July 26, 2021

## 2021-07-28 ENCOUNTER — TELEPHONE (OUTPATIENT)
Dept: FAMILY MEDICINE | Facility: CLINIC | Age: 61
End: 2021-07-28

## 2021-07-28 DIAGNOSIS — Z53.9 DIAGNOSIS NOT YET DEFINED: Primary | ICD-10-CM

## 2021-07-28 PROCEDURE — G0180 MD CERTIFICATION HHA PATIENT: HCPCS | Performed by: PHYSICIAN ASSISTANT

## 2021-07-28 NOTE — TELEPHONE ENCOUNTER
Received 7 page fax for Home Health Certification and Plan of Care for Lisa Pierre to complete. Form in the in-basket at 's desk.

## 2021-07-30 ENCOUNTER — HOSPITAL ENCOUNTER (OUTPATIENT)
Dept: WOUND CARE | Facility: CLINIC | Age: 61
Discharge: HOME | End: 2021-07-30
Attending: PHYSICIAN ASSISTANT | Admitting: PHYSICIAN ASSISTANT
Payer: MEDICARE

## 2021-07-30 ENCOUNTER — LAB (OUTPATIENT)
Dept: LAB | Facility: CLINIC | Age: 61
End: 2021-07-30
Payer: MEDICARE

## 2021-07-30 VITALS — DIASTOLIC BLOOD PRESSURE: 78 MMHG | HEART RATE: 81 BPM | TEMPERATURE: 97.3 F | SYSTOLIC BLOOD PRESSURE: 157 MMHG

## 2021-07-30 DIAGNOSIS — Z79.4 TYPE 2 DIABETES MELLITUS WITH DIABETIC NEUROPATHY, WITH LONG-TERM CURRENT USE OF INSULIN (H): ICD-10-CM

## 2021-07-30 DIAGNOSIS — E78.5 HYPERLIPIDEMIA LDL GOAL <100: ICD-10-CM

## 2021-07-30 DIAGNOSIS — L98.492 ULCER OF FINGER, WITH FAT LAYER EXPOSED (H): ICD-10-CM

## 2021-07-30 DIAGNOSIS — L98.499 ULCER OF FINGER, UNSPECIFIED ULCER STAGE (H): ICD-10-CM

## 2021-07-30 DIAGNOSIS — E11.40 TYPE 2 DIABETES MELLITUS WITH DIABETIC NEUROPATHY, WITH LONG-TERM CURRENT USE OF INSULIN (H): ICD-10-CM

## 2021-07-30 LAB
ALT SERPL W P-5'-P-CCNC: 30 U/L (ref 0–70)
CHOLEST SERPL-MCNC: 162 MG/DL
CREAT SERPL-MCNC: 1.32 MG/DL (ref 0.66–1.25)
ERYTHROCYTE [DISTWIDTH] IN BLOOD BY AUTOMATED COUNT: 13.3 % (ref 10–15)
FASTING STATUS PATIENT QL REPORTED: YES
GFR SERPL CREATININE-BSD FRML MDRD: 57 ML/MIN/1.73M2
HBA1C MFR BLD: 9.7 % (ref 0–5.6)
HCT VFR BLD AUTO: 31.5 % (ref 40–53)
HDLC SERPL-MCNC: 31 MG/DL
HGB BLD-MCNC: 11 G/DL (ref 13.3–17.7)
LDLC SERPL CALC-MCNC: 98 MG/DL
MCH RBC QN AUTO: 29.2 PG (ref 26.5–33)
MCHC RBC AUTO-ENTMCNC: 34.9 G/DL (ref 31.5–36.5)
MCV RBC AUTO: 84 FL (ref 78–100)
NONHDLC SERPL-MCNC: 131 MG/DL
PLATELET # BLD AUTO: 338 10E3/UL (ref 150–450)
RBC # BLD AUTO: 3.77 10E6/UL (ref 4.4–5.9)
T4 FREE SERPL-MCNC: 1.19 NG/DL (ref 0.76–1.46)
TRIGL SERPL-MCNC: 163 MG/DL
TSH SERPL DL<=0.005 MIU/L-ACNC: 1.85 MU/L (ref 0.4–4)
WBC # BLD AUTO: 7.5 10E3/UL (ref 4–11)

## 2021-07-30 PROCEDURE — 99214 OFFICE O/P EST MOD 30 MIN: CPT | Performed by: PHYSICIAN ASSISTANT

## 2021-07-30 PROCEDURE — 82565 ASSAY OF CREATININE: CPT

## 2021-07-30 PROCEDURE — 84460 ALANINE AMINO (ALT) (SGPT): CPT

## 2021-07-30 PROCEDURE — 84443 ASSAY THYROID STIM HORMONE: CPT

## 2021-07-30 PROCEDURE — 97602 WOUND(S) CARE NON-SELECTIVE: CPT

## 2021-07-30 PROCEDURE — 80061 LIPID PANEL: CPT

## 2021-07-30 PROCEDURE — 82043 UR ALBUMIN QUANTITATIVE: CPT

## 2021-07-30 PROCEDURE — 84439 ASSAY OF FREE THYROXINE: CPT

## 2021-07-30 PROCEDURE — 83036 HEMOGLOBIN GLYCOSYLATED A1C: CPT

## 2021-07-30 PROCEDURE — 36415 COLL VENOUS BLD VENIPUNCTURE: CPT

## 2021-07-30 PROCEDURE — 85027 COMPLETE CBC AUTOMATED: CPT

## 2021-07-30 NOTE — PROGRESS NOTES
"[facility]    ASSESSMENT:   1. (L98.492) Ulcer of finger, with fat layer exposed (Left 4th, 1st, and Right 1st, 3rd)    PLAN/DISCUSSION:   1. Wound care plan: secure piece of silvercel and change daily and PRN   2. I asked him to seriously consider having his wife open the cat food cans since this continues to be an issue.   3. May need splint to immobilize    HISTORY OF PRESENT ILLNESS:   Steve Morgan is a 61 year old male with poorly controlled DM, hypothyroidism, DAMIÁN, PAD, BPH, vit B12 deficiency, and obesity who returns today for a right third dorsal digit ulcer. This was a result of cutting his finger on a tin of cat food around January. Fortunately this ulceration has healed since the last time I saw him. However, he now has cuts on his left 1st, left 4th, right 1st and right 3rd finger. These are all also from cat food cans. He reports that he then \"chews\" on his fingers.     He was first seen in our clinic by Dr. Holley on 5/24 who felt that he had adequate arterial perfusion.     Seems to do best with Silvercel dressings. Previously using an ointment and things became quite macerated.    Wound (used by OP I only) 07/30/21 1008 Right laceration (Active)   Thickness/Stage full thickness 07/30/21 1000   Dressing Appearance moist drainage 07/30/21 1000   Base granulating 07/30/21 1000   Periwound intact 07/30/21 1000   Periwound Temperature warm 07/30/21 1000   Periwound Skin Turgor soft 07/30/21 1000   Edges open 07/30/21 1000   Length (cm) 0.9 07/30/21 1000   Width (cm) 3 07/30/21 1000   Depth (cm) 0.3 07/30/21 1000   Wound (cm^2) 2.7 cm^2 07/30/21 1000   Wound Volume (cm^3) 0.81 cm^3 07/30/21 1000   Drainage Characteristics/Odor serosanguineous 07/30/21 1000   Drainage Amount moderate 07/30/21 1000   Care, Wound non-select wound debridement performed 07/30/21 1000       Wound (used by OP I only) 07/30/21 1009 Left laceration (Active)   Thickness/Stage full thickness 07/30/21 1000 "   Dressing Appearance moist drainage 07/30/21 1000   Base granulating 07/30/21 1000   Periwound intact 07/30/21 1000   Periwound Temperature warm 07/30/21 1000   Periwound Skin Turgor soft 07/30/21 1000   Edges open 07/30/21 1000   Length (cm) 0.4 07/30/21 1000   Width (cm) 0.4 07/30/21 1000   Depth (cm) 0.1 07/30/21 1000   Wound (cm^2) 0.16 cm^2 07/30/21 1000   Wound Volume (cm^3) 0.02 cm^3 07/30/21 1000   Drainage Characteristics/Odor serosanguineous 07/30/21 1000   Drainage Amount moderate 07/30/21 1000   Care, Wound non-select wound debridement performed 07/30/21 1000       Wound (used by OP [facility] only) 07/30/21 1011 Left laceration (Active)   Thickness/Stage full thickness 07/30/21 1000   Dressing Appearance moist drainage 07/30/21 1000   Base granulating 07/30/21 1000   Periwound intact 07/30/21 1000   Periwound Temperature warm 07/30/21 1000   Periwound Skin Turgor soft 07/30/21 1000   Edges open 07/30/21 1000   Length (cm) 0.7 07/30/21 1000   Width (cm) 2.5 07/30/21 1000   Depth (cm) 0.2 07/30/21 1000   Wound (cm^2) 1.75 cm^2 07/30/21 1000   Wound Volume (cm^3) 0.35 cm^3 07/30/21 1000   Drainage Characteristics/Odor serosanguineous 07/30/21 1000   Drainage Amount moderate 07/30/21 1000   Care, Wound non-select wound debridement performed 07/30/21 1000                 MDM: 30-39 minutes were spent on the date of the visit reviewing previous chart notes, evaluating patient and developing the treatment plan, this excludes any time spent on procedures.       JON ZARATE PA-C

## 2021-07-30 NOTE — DISCHARGE INSTRUCTIONS
[facility]  [address] [address]    Call us at [phone] if you have any questions about your wounds, have redness or swelling around your wound, have a fever of 101 or greater or if you have any other problems or concerns. We answer the phone Monday through Friday 8 am to 4 pm, please leave a message as we check the voicemail frequently throughout the day.     Steve Morgan      1960    Wound care recommendations to open areas on right and left finger open areas:  Wash hands with soap and water. Apply Silvercel to wound bed, hold in place with  waterproof tape and change 1-2 times a day.     Paulina Abarca PA-C. July 30, 2021    Wound Clinic follow up in 4 weeks    If you had a positive experience please indicate that on your patient satisfaction survey form that [facility] will be sending you.    It was a pleasure meeting with you today.  Thank you for allowing me and my team the privilege of caring for you today.  YOU are the reason we are here, and I truly hope we provided you with the excellent service you deserve.  Please let us know if there is anything else we can do for you so that we can be sure you are leaving completely satisfied with your care experience.      If you have any billing related questions please call the [facility] office at [phone]. The clinic staff does not handle billing related matters.

## 2021-08-01 LAB
CREAT UR-MCNC: 70 MG/DL
MICROALBUMIN UR-MCNC: 84 MG/DL
MICROALBUMIN/CREAT UR: 120 MG/G CR (ref 0–17)

## 2021-08-03 NOTE — RESULT ENCOUNTER NOTE
Labs results/imaging studies results noted. Will discuss in next clinic visit 8/24/21.  Lab Results       Component                Value               Date                       A1C                      9.7                 07/30/2021                 A1C                      7.4                 02/22/2021                 A1C                      9.9                 03/12/2020              Your microalbumen is elevated. This means you have some protein in the urine. It indicates that your kidneys are being affected by diabetes. Keeping blood pressure and blood sugar optimal is the best treatment in order to keep this  Stable. Anybody that has this should be on lisinopril/losartan-as you already are-since this is protective for the kidney's.             Thyroid labs in range.       Here is a copy for your records.  Follow-up in endocrinology Clinic as scheduled/discussed. We will review these studies/results in further detail at that visit, but feel free to contact us with any other questions in the interim.    Please call endocrinology clinic (nurse line: [phone]) if questions.    Cindi Meraz MD

## 2021-08-09 ENCOUNTER — OFFICE VISIT (OUTPATIENT)
Dept: PODIATRY | Facility: CLINIC | Age: 61
End: 2021-08-09
Payer: MEDICARE

## 2021-08-09 VITALS
BODY MASS INDEX: 41.15 KG/M2 | WEIGHT: 247 LBS | HEIGHT: 65 IN | SYSTOLIC BLOOD PRESSURE: 148 MMHG | DIASTOLIC BLOOD PRESSURE: 80 MMHG

## 2021-08-09 DIAGNOSIS — E11.42 DIABETIC POLYNEUROPATHY ASSOCIATED WITH TYPE 2 DIABETES MELLITUS (H): Primary | ICD-10-CM

## 2021-08-09 DIAGNOSIS — T25.221A BLISTERS WITH EPIDERMAL LOSS DUE TO BURN (SECOND DEGREE) OF FOOT, RIGHT, INITIAL ENCOUNTER: ICD-10-CM

## 2021-08-09 DIAGNOSIS — T25.222A: ICD-10-CM

## 2021-08-09 DIAGNOSIS — L97.522 ULCER OF LEFT FOOT WITH FAT LAYER EXPOSED (H): ICD-10-CM

## 2021-08-09 DIAGNOSIS — M76.62 TENDONITIS, ACHILLES, LEFT: ICD-10-CM

## 2021-08-09 DIAGNOSIS — L97.512 ULCER OF RIGHT FOOT WITH FAT LAYER EXPOSED (H): ICD-10-CM

## 2021-08-09 PROCEDURE — 99213 OFFICE O/P EST LOW 20 MIN: CPT | Performed by: PODIATRIST

## 2021-08-09 ASSESSMENT — MIFFLIN-ST. JEOR: SCORE: 1842.26
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
## 2021-08-09 NOTE — PATIENT INSTRUCTIONS
Thank you for choosing [facility] Podiatry / Foot & Ankle Surgery!    DR ERICKSON'S CLINIC LOCATIONS  [place] [id number] [facility] [address]   [place] [address] [place], [place] [id number] [phone]       SET UP SURGERY: [phone]    APPOINTMENTS: [phone]    BILLING QUESTIONS: [phone]    FAX NUMBER: [phone]        Follow up: as needed      TENDONITIS   Tendons are the strong fibrous portions of muscles that attach to bones and allow the muscle to move a joint when it contracts. Tendons are very strong because they have a lot of force exerted on them. Sometimes tendons can become painful because they have suffered an acute injury, in which too much force was exerted at one time, or an overuse injury, in which a normal force was exerted too frequently or over a prolonged period of time. As a result, there is damage to the tendon and its surrounding soft tissue structures and they become inflammed. Because tendons do not have a great blood supply, they do not heal rapidly and the inflammation can become chronic.   Conservative treatment for tendinitis involves rest and anti-inflammatory measures. Ice is applied 15 minutes 2-3 times daily. Anti-inflammatory medications called NSAIDs (ibuprofen, example) can be taken provided they are used with caution, as they can lead to internal bleeding and increase the risk ofstroke and heart attack. Sometimes topical nitroglycerin is prescribed to help with pain. Often your doctor will use a special shoe or removable walking cast to immobilize the tendon, allowing it to heal without further damage from use. These devices are very useful in helping tendons heal, but they may slow you down or make you feel like your hip, knee, or back are out ofalignment. This is temporary and should go away once you are out ofthe immobilization. You should not use a walking cast when showering or driving. Another option is  Platelet Rich Plasma injections. (Normally done with a Sports and Orthorapedic doctor.   If conservative measures fail, your physician may need to surgically repair the tendon by removing any chronic inflammatory tissue and sewing it back together. Sometimes it is sewn to an adjacent tendon with similar function for support and sometimes it is lengthened. . Sometimes the bones around the tendon need to be realigned or reshaped to better support the tendon or prevent further damage. Your foot and ankle surgeon will discuss the specifics of your surgery with you, should you need it.      Towel stretch: Sit on a hard surface with your injured leg stretched out in front of you. Loop a towel around your toes and the ball of your foot and pull the towel toward your body keeping your leg straight. Hold this position for 15 to 30 seconds and then relax. Repeat 3 times. Then push the towel away with the ball of your foot. Repeat 3 times.  When you don't feel much of a stretch using the towel, you can start the standing calf stretch and the following exercises.    Standing calf stretch: Stand facing a wall with your hands on the wall at about eye level. Keep your injured leg back with your heel on the floor. Keep the other leg forward with the knee bent. Turn your back foot slightly inward (as if you were pigeon-toed). Slowly lean into the wall until you feel a stretch in the back of your calf. Hold the stretch for 15 to 30 seconds. Return to the starting position. Repeat 3 times. Do this exercise several times each day.     Standing soleus stretch: Stand facing a wall with your hands on the wall at about chest height. Keep your injured leg back with your heel on the floor. Keep the other leg forward with the knee bent. Turn your back foot slightly inward (as if you were pigeon-toed). Bend your back knee slightly and gently lean into the wall until you feel a stretch in the lower calf of your injured leg. Hold the stretch for  15 to 30 seconds. Return to the starting position. Repeat 3 times.     Achilles stretch: Stand with the ball of one foot on a stair. Reach for the step below with your heel until you feel a stretch in the arch of your foot. Hold this position for 15 to 30 seconds and then relax. Repeat 3 times.     Heel raise: Balance yourself while standing behind a chair or counter. Using the chair or counter as a support to help you, raise your body up onto your toes and hold for 5 seconds. Then slowly lower yourself down without holding onto the support. (It's OK to keep holding onto the support if you need to.) When this exercise becomes less painful, try lowering yourself down on the injured leg only. Repeat 15 times. Do 2 sets of 15. Rest 30 seconds between sets.     Step-up: Stand with the foot of your injured leg on a support 3 to 5 inches high (like a small step or block of wood). Keep your other foot flat on the floor. Shift your weight onto the injured leg on the support. Straighten your injured leg as the other leg comes off the floor. Return to the starting position by bending your injured leg and slowly lowering your uninjured leg back to the floor. Do 2 sets of 15.     Resisted ankle eversion: Sit with both legs stretched out in front of you, with your feet about a shoulder's width apart. Tie a loop in one end of elastic tubing. Put the foot of your injured leg through the loop so that the tubing goes around the arch of that foot and wraps around the outside of the other foot. Hold onto the other end of the tubing with your hand to provide tension. Turn the foot of your injured leg up and out. Make sure you keep your other foot still so that it will allow the tubing to stretch as you move the foot of your injured leg. Return to the starting position. Do 2 sets of 15.     Balance and reach exercises: Stand next to a chair with your injured leg farther from the chair. The chair will provide support if you need it.  Stand on the foot of your injured leg and bend your knee slightly. Try to raise the arch of this foot while keeping your big toe on the floor. Keep your foot in this position. With the hand that is farther away from the chair, reach forward in front of you by bending at the waist. Avoid bending your knee any more as you do this. Repeat this 10 times. To make the exercise more challenging, reach farther in front of you. Do 2 sets of 10.  the same position as above. While keeping your arch height, reach the hand that is farther away from the chair across your body toward the chair. The farther you reach, the more challenging the exercise. Do 2 sets of 10.     Resisted ankle eversion: Sit with both legs stretched out in front of you, with your feet about a shoulder's width apart. Tie a loop in one end of elastic tubing. Put the foot of your injured leg through the loop so that the tubing goes around the arch of that foot and wraps around the outside of the other foot. Hold onto the other end of the tubing with your hand to provide tension. Turn the foot of your injured leg up and out. Make sure you keep your other foot still so that it will allow the tubing to stretch as you move the foot of your injured leg. Return to the starting position. Do 2 sets of 15.   If you have access to a wobble board, do the following exercises:  Wobble board exercises:     Stand on a wobble board with your feet shoulder width apart. Rock the board forwards and backwards 30 times, then side to side 30 times. Hold on to a chair if you need support.     Rotate the wobble board around so that the edge of the board is in contact with the floor at all times. Do this 30 times in a clockwise and then a counterclockwise direction.     Balance on the wobble board for as long as you can without letting the edges touch the floor. Try to do this for 2 minutes without touching the floor.     Rotate the wobble board in clockwise and  counterclockwise circles, but do not let the edge of the board touch the floor.     When you have mastered exercises A through D, try repeating them while standing on just your injured leg.     After you are able to do these exercises on one leg, try to do them with your eyes closed. Make sure you have something nearby to support you in case you lose your balance.
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
## 2021-08-09 NOTE — PROGRESS NOTES
Caio is a 61 year old who is being evaluated via a billable video visit.      How would you like to obtain your AVS? MyChart  Will anyone else be joining your video visit? No      Video Start Time: 10:35AM    Video-Visit Details    Type of service:  Video Visit    Video End Time:10:51AM    Originating Location (pt. Location): Home    Distant Location (provider location):  [facility] SLEEP [facility]     Platform used for Video Visit: [facility] [place]   Outpatient Sleep Medicine  Aug 10, 2021       Name: Steve Morgan MRN# [id number]   Age: 61 year old YOB: 1960            Assessment and Plan:   1. Upper airway resistance syndrome    Patient's sleep disordered breathing is adequately managed and well treated with current PAP settings [id number]. Residual AHI low at 1.89 events per hour. Compliance is excellent. Continues to tolerate PAP well. No indication to change pressure settings today. Will renew mask/supplies order for next year.   - Comprehensive DME    Steve Morgan will follow up in about 1 year, or sooner prn.         Chief Complaint      Chief Complaint   Patient presents with     CPAP Follow Up          History of Present Illness:     Steve Morgan is a 61 year old male who presents to the clinic for follow-up of their severe upper airway resistance syndrome/history of obstructive sleep apnea. Other significant past medical history significant for HTN, HLD, DM with peripheral neuropathy, hypothyroidism, PAD, BPH, obesity, and depression.     Originally diagnosed with DAMIÁN 10/3/2002 through [facility] ([id number]#) with split night study revealing AHI 31/hr, RDI 46/hr, oxygen johnnie 83%; CPAP 8cm identified as optimal setting and started CPAP at that time. More recently, he had a split night PSG completed on 1/18/2010 through [facility] revealing predominantly respiratory effort related arousals and severe upper airway resistance syndrome with an AHI 1.6  "and RDI 33.8; CPAP 8cmH2O again adequately treated sleep disordered breathing events. He was treated with CPAP 8cmH2O for a number of years until switched to auto- titrating PAP [id number] in 12/2014 due to residular snoring and weight gain (pt was 104kg at time of study, 107kg in 12/2014). Replacement machine through [facility] on 8/14/2020.     Returns to clinic today for routine follow-up. Scheduled his yearly visit a couple months in advance. No concerns or complaints today. Reports \"I am sleeping good everything has been good\".     Patient is using a nasal mask. The mask is comfortable. The mask is not leaking. They are not snoring with the mask on. They are not having gasp arousals.  They are not having significant oral/nasal dryness or epistaxis.  They are not having pain/skin breakdown. The pressure settings are comfortable.     Bedtime is typically 11-11:30PM. Usually it takes about 15-20 minutes to fall asleep with the mask on. Wakes 2-3 times per night for the restroom. Wake time is typically 9-10:00AM. Patient is using PAP therapy ~9 hours per night. The patient is usually getting ~9 hours of sleep per night.     ResMed Auto-PAP 7.0 - 15.0 cmH2O 30 day usage data:    100% of days with > 4 hours of use. 0/30 days with no use.   Average use 554 minutes per day.   95%ile Leak 17.11 L/min.   CPAP 95% pressure 13 cm.   AHI 1.89 events per hour.     SCALES:   INSOMNIA: Insomnia Severity Score: 8   SLEEPINESS: [facility] Sleepiness Score: 7    Past medical/surgical history, family history, social history, medications and allergies were reviewed.           Physical Examination:   There were no vitals taken for this visit.  General appearance: Awake, alert, cooperative. Well groomed. Sitting comfortably in chair. In no apparent distress.  HEENT: Head: Normocephalic, atraumatic. Eyes:Conjunctiva clear. Sclera normal. Nose: External appearance without deformity.   Pulmonary:  Able to speak easily in full sentences. No cough " or wheeze.   Skin:  No rashes or significant lesions on visible skin.   Neurologic: Alert, oriented x3.   Psychiatric: Mood euthymic. Affect congruent with full range and intensity.    CC:  Lisa Pierre PA-C  Aug 10, 2021     [facility] [place]  [address] [place] [address]     [facility]  [address] [facility] [address] [address]    Chart documentation was completed, in part, with iSpye voice-recognition software. Even though reviewed, some grammatical, spelling, and word errors may remain.    24 minutes spent on day of encounter doing chart review, history and exam, documentation, and further activities as noted above

## 2021-08-10 ENCOUNTER — VIRTUAL VISIT (OUTPATIENT)
Dept: SLEEP MEDICINE | Facility: CLINIC | Age: 61
End: 2021-08-10
Payer: MEDICARE

## 2021-08-10 DIAGNOSIS — G47.8 UPPER AIRWAY RESISTANCE SYNDROME: Primary | ICD-10-CM

## 2021-08-10 DIAGNOSIS — E11.40 TYPE 2 DIABETES MELLITUS WITH DIABETIC NEUROPATHY, WITHOUT LONG-TERM CURRENT USE OF INSULIN (H): ICD-10-CM

## 2021-08-10 DIAGNOSIS — R73.9 HYPERGLYCEMIA: ICD-10-CM

## 2021-08-10 PROCEDURE — 99213 OFFICE O/P EST LOW 20 MIN: CPT | Mod: 95 | Performed by: PHYSICIAN ASSISTANT

## 2021-08-10 ASSESSMENT — SLEEP AND FATIGUE QUESTIONNAIRES
HOW LIKELY ARE YOU TO NOD OFF OR FALL ASLEEP WHILE SITTING QUIETLY AFTER LUNCH WITHOUT ALCOHOL: MODERATE CHANCE OF DOZING
HOW LIKELY ARE YOU TO NOD OFF OR FALL ASLEEP WHILE SITTING INACTIVE IN A PUBLIC PLACE: SLIGHT CHANCE OF DOZING
HOW LIKELY ARE YOU TO NOD OFF OR FALL ASLEEP WHILE WATCHING TV: SLIGHT CHANCE OF DOZING
HOW LIKELY ARE YOU TO NOD OFF OR FALL ASLEEP WHILE SITTING AND READING: SLIGHT CHANCE OF DOZING
HOW LIKELY ARE YOU TO NOD OFF OR FALL ASLEEP IN A CAR, WHILE STOPPED FOR A FEW MINUTES IN TRAFFIC: WOULD NEVER DOZE
HOW LIKELY ARE YOU TO NOD OFF OR FALL ASLEEP WHILE SITTING AND TALKING TO SOMEONE: WOULD NEVER DOZE
HOW LIKELY ARE YOU TO NOD OFF OR FALL ASLEEP WHEN YOU ARE A PASSENGER IN A CAR FOR AN HOUR WITHOUT A BREAK: WOULD NEVER DOZE
HOW LIKELY ARE YOU TO NOD OFF OR FALL ASLEEP WHILE LYING DOWN TO REST IN THE AFTERNOON WHEN CIRCUMSTANCES PERMIT: MODERATE CHANCE OF DOZING

## 2021-08-14 DIAGNOSIS — E11.40 TYPE 2 DIABETES MELLITUS WITH DIABETIC NEUROPATHY, WITHOUT LONG-TERM CURRENT USE OF INSULIN (H): ICD-10-CM

## 2021-08-14 DIAGNOSIS — R73.9 HYPERGLYCEMIA: ICD-10-CM

## 2021-08-17 DIAGNOSIS — G47.33 OSA (OBSTRUCTIVE SLEEP APNEA): Primary | ICD-10-CM

## 2021-08-17 RX ORDER — INSULIN GLARGINE 100 [IU]/ML
INJECTION, SOLUTION SUBCUTANEOUS
Qty: 30 ML | Refills: 0 | Status: SHIPPED | OUTPATIENT
Start: 2021-08-17 | End: 2021-12-30

## 2021-08-17 NOTE — TELEPHONE ENCOUNTER
3 month ange refill approved.     Further refills will be addressed at scheduled appointment. Gilda Chavez RN on 8/17/2021 at 10:06 AM

## 2021-08-19 DIAGNOSIS — R73.9 HYPERGLYCEMIA: ICD-10-CM

## 2021-08-19 DIAGNOSIS — E11.40 TYPE 2 DIABETES MELLITUS WITH DIABETIC NEUROPATHY, WITHOUT LONG-TERM CURRENT USE OF INSULIN (H): ICD-10-CM

## 2021-08-20 RX ORDER — FLURBIPROFEN SODIUM 0.3 MG/ML
SOLUTION/ DROPS OPHTHALMIC
Qty: 400 EACH | Refills: 0 | Status: SHIPPED | OUTPATIENT
Start: 2021-08-20 | End: 2021-11-21

## 2021-08-20 NOTE — TELEPHONE ENCOUNTER
3 month ange refill approved. Further refills will bee addressed at scheduled visit.     Gilda Chavez RN on 8/20/2021 at 3:10 PM

## 2021-08-23 ENCOUNTER — TELEPHONE (OUTPATIENT)
Dept: FAMILY MEDICINE | Facility: CLINIC | Age: 61
End: 2021-08-23

## 2021-08-23 ENCOUNTER — MYC MEDICAL ADVICE (OUTPATIENT)
Dept: ENDOCRINOLOGY | Facility: CLINIC | Age: 61
End: 2021-08-23

## 2021-08-23 NOTE — TELEPHONE ENCOUNTER
Summary:    Patient is due/failing the following:   A1C    Reviewed:  [x] CARE EVERYWHERE  [x] LAST OV NOTE INCLUDING ENDO  [x] FYI TAB  [x] MYCHART ACTIVE?  [x] LAST PANEL ENCOUNTER  [x] FUTURE APPTS  [x] IMMUNIZATIONS          Action needed:   Patient needs office visit for f/u diabetes.    Type of outreach:    pt has an appt tomorrow with Linn Ricardo/[facility]  [place]

## 2021-08-24 ENCOUNTER — VIRTUAL VISIT (OUTPATIENT)
Dept: ENDOCRINOLOGY | Facility: CLINIC | Age: 61
End: 2021-08-24
Payer: MEDICARE

## 2021-08-24 DIAGNOSIS — R73.9 HYPERGLYCEMIA: ICD-10-CM

## 2021-08-24 DIAGNOSIS — E11.40 TYPE 2 DIABETES MELLITUS WITH DIABETIC NEUROPATHY, WITHOUT LONG-TERM CURRENT USE OF INSULIN (H): Primary | ICD-10-CM

## 2021-08-24 DIAGNOSIS — E11.40 TYPE 2 DIABETES MELLITUS WITH DIABETIC NEUROPATHY, WITH LONG-TERM CURRENT USE OF INSULIN (H): ICD-10-CM

## 2021-08-24 DIAGNOSIS — E03.4 HYPOTHYROIDISM DUE TO ACQUIRED ATROPHY OF THYROID: ICD-10-CM

## 2021-08-24 DIAGNOSIS — Z79.4 TYPE 2 DIABETES MELLITUS WITH DIABETIC NEUROPATHY, WITH LONG-TERM CURRENT USE OF INSULIN (H): ICD-10-CM

## 2021-08-24 PROCEDURE — 95251 CONT GLUC MNTR ANALYSIS I&R: CPT | Performed by: INTERNAL MEDICINE

## 2021-08-24 PROCEDURE — 99214 OFFICE O/P EST MOD 30 MIN: CPT | Mod: 95 | Performed by: INTERNAL MEDICINE

## 2021-08-24 RX ORDER — LEVOTHYROXINE SODIUM 137 UG/1
137 TABLET ORAL DAILY
Qty: 90 TABLET | Refills: 3 | Status: SHIPPED | OUTPATIENT
Start: 2021-08-24 | End: 2022-09-08

## 2021-08-24 RX ORDER — GLIPIZIDE 10 MG/1
TABLET, FILM COATED, EXTENDED RELEASE ORAL
Qty: 180 TABLET | Refills: 1 | Status: SHIPPED | OUTPATIENT
Start: 2021-08-24 | End: 2022-02-28

## 2021-08-24 NOTE — PATIENT INSTRUCTIONS
[facility]  Dr Meraz, Endocrinology Department    [facility]   [address] [facility]  [place] [address]  Appointment [phone]  Fax: [phone]  Ignacio: Monday - Thursday      Please check the cost coverage and copay with insurance before recommended tests, services and medications (especially if new medications are prescribed).     If ordered, please get blood work done 1 week prior to your next appointment so they will be available to Dr. Meraz at your visit.    To provide the best diabetic care, please bring your blood glucose meter to each and every visit with your  Endocrinologist. Your blood glucose meter/insulin pump will be downloaded at every appointment.  Please arrive 15 minutes before your scheduled appointment. This will allow for your blood glucose meter/insulin pump  to be downloaded.  If you are wearing DEXCOM please bring  or sharing code from the Dexcom Clarity Appt so that it can be downloaded.  If you are using freestyle juan personal sensors please bring the reader.  If you are using TANDEM insulin pump please have your username and password to get info from Tandem website.    Continue Metformin 1000 mg BID  Continue Glipizide XL 20 mg/day  Decrease Basaglar to 30 units at night  Restart  Novolog 5 units at each meal.  Follow up with [facility] at [place] in 4 weeks.  Continue to use juan.  Scan often.  Repeat labs in 3 months.  Please make a lab appointment for blood work and follow up clinic appointment in 1 week after that to discuss results.    Your provider has referred you to Diabetes Education: For all [place] [facility]:  Phone [phone]; Fax [phone]  Please call and make the appointment.    Recommend checking blood sugars before meals and at bedtime.    If Blood glucose are low more often-> 2-3 times/week- give us a call.  The patient is advised to Make better food choices: reduce carbs, Reduce portion  size, weight loss and exercise 3-4 times a week.  Discussed hypoglycemia signs and symptoms as well as management in detail.

## 2021-08-24 NOTE — LETTER
"    8/24/2021         RE: Stvee Morgan  [id number]  [facility] [id number]        Dear Colleague,    Thank you for referring your patient, Steve Morgan, to the [facility]. Please see a copy of my visit note below.    THIS IS A VIDEO VISIT:    Phone call visit/virtual visit encounter:  [place] from Diana Butler.    Name of patient: Steve Morgan    Date of encounter: 8/24/2021    Time of start of video visit: 2:26    Video started: 2: 38    Video ended: 3:04    Provider location: working from home/ [facility]    Patient location: patients home.    Mode of transmission: video/ Doximity    Verbal consent: obtained before starting visit. Pt is agreeable.      The patient has been notified of following:      \"This VIDEO visit will be conducted via a call between you and your physician/provider. We have found that certain health care needs can be provided without the need for a physical exam.  This service lets us provide the care you need with a short phone conversation.  If a prescription is necessary we can send it directly to your pharmacy.  If lab work is needed we can place an order for that and you can then stop by our lab to have the test done at a later time.     With new updates with corona virus patient might be billed as clinic visit.     If during the course of the call the physician/provider feels a telephone visit is not appropriate, you will not be charged for this service.\"      Past medical history, social history, family history, allergy and medications were reviewed and updated as appropriate.  Reviewed pertinent labs, notes, imaging studies personally.  Total time spent > 45 min.    Name: Steve \"Caio\" Cathy  F/u for Diabetes.  HPI:  For f/u of DM.    has a past medical history of Cellulitis and abscess of trunk (6/27/2017), Depression, Hyperlipidemia LDL goal <100 (10/31/2010), Hypertension goal BP (blood pressure) < 140/90 (3/17/2011), Hypothyroidism " (1/12/2010), Morbid obesity due to excess calories (H) (1/12/2010), Neuropathy in diabetes (H) (1/12/2010), Obesity (1/12/2010), Sleep apnea (1/12/2010), and Type 2 diabetes, HbA1C goal < 8% (H) (3/8/2011).    A1c increased to 9.7%.  He reports that he was forgetting to take oral medications and NOVOLOG.  He is no longer taking Novolog- he got in habit of NOT taking it and eventually stopped taking it.  Stopped taking it X 4-6 months.  Using nina.  1. Type 2 DM:  Orginally diagnosed at the age of 33 or 38.  Was prediabetic prior, was not particularly symptomatic at the time, was noted on routine lab work    Current Regimen: Metformin 1000 mg BID, Glipizide XL 20 mg/day, Basaglar 32 units at night, Novolog 1:9 + 1:50>150 ( about 40 units/day).      The plan was to start Trulicity but the copay was not affordable ($100 per refill).    yes:     Diabetes Medication(s)     Biguanides       metFORMIN (GLUCOPHAGE) 1000 MG tablet    TAKE 1 TABLET BY MOUTH TWICE A DAY WITH MEALS    Insulin       insulin glargine (BASAGLAR KWIKPEN) 100 UNIT/ML pen    INJECT 30 UNITS UNDER THE SKIN ONCE DAILY. INCREASE AS DIRECTED TO MAX DOSE OF 45 UNITS     NOVOLOG FLEXPEN 100 UNIT/ML soln    TAKE 5 UNITS BEFORE EACH MEAL. INCREASE AS DIRECTED UP TO 45 UNITS PER DAY.    Sulfonylureas       glipiZIDE (GLUCOTROL XL) 10 MG 24 hr tablet    TAKE 2 TABLETS BY MOUTH EVERY DAY        BS checks: nina  Nina Download: Not checking.  Unable to use glucose meter - he has a hard time doing fingerstick testing due to his tremor.    Complications:   Diabetes Complications  Description / Detail    Diabetic Retinopathy  No retinopathy,last exam 2/2020, [facility]   CAD / PAD  No   Neuropathy  Yes + diabetic neuropathy: numbness hands and feet.  Saw podiatry, Dr. Mathis, 1/27/2018- using diabetic shoes   Nephropathy / Microalbuminuria  Yes, elevated urine microalbumin   Gastroparesis  No   Hypoglycemia [place]  Not sure, gets 'kind of dizzy' when blood sugar  is low but reports history of low blood sugars without symptoms     2. Hypertension: Blood Pressure:   BP Readings from Last 3 Encounters:   08/09/21 (!) 148/80   07/30/21 (!) 157/78   07/19/21 118/64   Blood pressure medications include atenolol 25 mg qd and losartan 50 mg every day.   3. Hyperlipidemia: Takes simvastatin 20 mg for lipid control.       4.  Hypothyroidism. Currently treated with levoxyl (KAMERON) 137 mcg daily. Takes the levoxyl first thing in the morning and waits 30+ minutes before eating breakfast. PA for Levoxyl approved through 4/7/2021.  PMH/PSH:  Past Medical History:   Diagnosis Date     Cellulitis and abscess of trunk 6/27/2017     Depression      Hyperlipidemia LDL goal <100 10/31/2010     Hypertension goal BP (blood pressure) < 140/90 3/17/2011     Hypothyroidism 1/12/2010     Morbid obesity due to excess calories (H) 1/12/2010     Neuropathy in diabetes (H) 1/12/2010     Obesity 1/12/2010     Sleep apnea 1/12/2010    CPAP     Type 2 diabetes, HbA1C goal < 8% (H) 3/8/2011     Past Surgical History:   Procedure Laterality Date     BIOPSY       COLONOSCOPY       EYE SURGERY       GENITOURINARY SURGERY      surg for undescended testicle     REPAIR HAMMER TOE BILATERAL  5/16/2013    Procedure: REPAIR HAMMER TOE BILATERAL;  Flexor Tenotomy Toes 2,3,4,5 Bilateral Feet;  Surgeon: Saad Bangura DPM;  Location: [facility] OR     Family Hx:  Family History   Problem Relation Age of Onset     Breast Cancer Mother      Hypertension Mother      Thyroid Disease Mother      Depression Mother      Alzheimer Disease Mother 82     Cancer - colorectal Father      Thyroid Disease Father      Depression Father      Other - See Comments Father         bladder polyps     Heart Disease Maternal Grandmother         CHF     Circulatory Paternal Grandmother      Cancer Paternal Grandfather      Diabetes Brother      Diabetes Brother      Asthma Brother      Thyroid disease: Yes: mother         DM2: Yes: one brother with  type 1 diabetes and one brother with type 2 diabetes, paternal aunt with DM2         Autoimmune: DM1, SLE, RA, Vitiligo Yes: brother with DM1    Social Hx:  Social History     Socioeconomic History     Marital status:      Spouse name: Sri     Number of children: 2     Years of education: Not on file     Highest education level: Associate degree: occupational, technical, or vocational program   Occupational History     Occupation:      Employer: NONE      Comment: [employer]   Tobacco Use     Smoking status: Never Smoker     Smokeless tobacco: Never Used   Substance and Sexual Activity     Alcohol use: Yes     Comment: Occassionally     Drug use: No     Sexual activity: Not Currently     Partners: Female     Birth control/protection: Abstinence   Other Topics Concern     Parent/sibling w/ CABG, MI or angioplasty before 65F 55M? No   Social History Narrative     Not on file     Social Determinants of Health     Financial Resource Strain: Low Risk      Difficulty of Paying Living Expenses: Not hard at all   Food Insecurity: No Food Insecurity     Worried About Running Out of Food in the Last Year: Never true     Ran Out of Food in the Last Year: Never true   Transportation Needs: No Transportation Needs     Lack of Transportation (Medical): No     Lack of Transportation (Non-Medical): No   Physical Activity:      Days of Exercise per Week:      Minutes of Exercise per Session:    Stress:      Feeling of Stress :    Social Connections:      Frequency of Communication with Friends and Family:      Frequency of Social Gatherings with Friends and Family:      Attends Hindu Services:      Active Member of Clubs or Organizations:      Attends Club or Organization Meetings:      Marital Status:    Intimate Partner Violence:      Fear of Current or Ex-Partner:      Emotionally Abused:      Physically Abused:      Sexually Abused:           MEDICATIONS:  has a current medication list which includes the  following prescription(s): ammonium lactate, ammonium lactate, aspirin, atenolol, bupropion, calcium carb-cholecalciferol, citalopram, freestyle juan 14 day reader, freestyle juan 14 day sensor, cyanocobalamin, famotidine, ferrous sulfate, finasteride, glipizide, hydrocortisone, insulin glargine, b-d u/f, levoxyl, losartan, metformin, multi-vitamin, mupirocin, mupirocin, novolog flexpen, nystatin, pantoprazole, simvastatin, and cholecalciferol.    ROS     ROS: 10 point ROS neg other than the symptoms noted above in the HPI.    PE:  There were no vitals taken for this visit.  GENERAL: healthy, alert and no distress  EYES: Eyes grossly normal to inspection, conjunctivae and sclerae normal  ENT: no nose swelling, nasal discharge.  Thyroid: no apparent thyroid nodules  RESP: no audible wheeze, cough, or visible cyanosis.  No visible retractions or increased work of breathing.  Able to speak fully in complete sentences.  ABDO: not evaluated.  EXTREMITIES: no hand tremors.  NEURO: Cranial nerves grossly intact, mentation intact and speech normal  SKIN: No apparent skin lesions, rash or edema seen   PSYCH: mentation appears normal, affect normal/bright, judgement and insight intact, normal speech and appearance well-groomed    LABS:    Component      Latest Ref Rng & Units 8/29/2019   Glucose 316   C-Peptide      0.9 - 6.9 ng/mL 2.5     A1c:  Lab Results   Component Value Date    A1C 9.7 07/30/2021    A1C 7.4 02/22/2021    A1C 9.9 03/12/2020    A1C 11.7 12/05/2019    A1C 11.6 08/29/2019    A1C 11.8 12/14/2018       Basic Metabolic Panel:  Creatinine   Date Value Ref Range Status   07/30/2021 1.32 (H) 0.66 - 1.25 mg/dL Final   01/16/2020 1.01 0.66 - 1.25 mg/dL Final     Urine microabumin:  Lab Results   Component Value Date    UMALCR 120.00 07/30/2021    UMALCR 81.46 03/05/2020       LFTS/Cholesterol Panel:  Recent Labs   Lab Test 07/30/21  0708 03/05/20  0857 05/21/15  1104 04/19/14  0802   CHOL 162 139 137 122   HDL 31*  33* 30* 23*   LDL 98 88 78 71   TRIG 163* 89 144 141   CHOLHDLRATIO  --   --  4.6 5.3*       Thyroid Function:  ENDO THYROID LABS-[facility] Latest Ref Rng & Units 7/30/2021   TSH 0.40 - 4.00 mU/L 1.85   FREE T3 2.3 - 4.2 pg/mL    T4 0.76 - 1.46 ng/dL 1.19     Component    Latest Ref Rng & Units 10/19/2018   Thyroid Peroxidase Antibody    <35 IU/mL 11   Thyroglobulin Antibody    <40 IU/mL <20   Thyroid Stim Immunog    <=1.3 TSI index <1.0     All pertinent notes, labs, and images personally reviewed by me.     A/P  Mr.Walter Morgan is a 61 year old evaluated via phone visit for the management of:    1. DM2 - Under good control. A1c increased to 9.7%.  Diabetes is complicated by neuropathy and nephropathy/elevated urine microalbumin.  He has challenges with blood sugar testing due to limited use of his hands (neuropathy + tremor - both significant) and increasing difficulty with ADL's   Can't test his blood sugars using a meter due to the above.   He also has an extremely hard time inserting the Nina sensors due to neuropathy and tremor.  Is not complainant with regimen. Forgetting oral medications.  Not using Novolog currently- last visit 3-4 months back.  Using nina but limited data available.  Plan:  Discussed diagnosis, pathophysiology, management and treatment options of condition with pt.  I also discussed importance of strict blood sugar control to prevent complications associated with uncontrolled diabetes.    Continue Metformin 1000 mg BID.  Continue Glipizide XL 20 mg/day.  Decrease Basaglar to 30 units at night. ( in the setting of low BG episodes).  Restart  Novolog 5 units at each meal.  Follow up with [facility] at [place] in 4 weeks.  Continue to use nina.  Scan often.  Repeat labs in 3 months.  Please make a lab appointment for blood work and follow up clinic appointment in 1 week after that to discuss results.    2. Hypothyroidism.  Currently treated with Levoxyl (KAMERON) 137 mcg/day.  Clinically and  biochemically euthyroid.   Labs in range.  Plan:  Discussed diagnosis, pathophysiology, management and treatment options of condition with pt.  Continue Levoxyl 137 mcg/day.   Labs in 1 year or sooner if concerns.    Discussed s/s of hypothyroidism and hyperthyroidism to watch for.  The patient indicates understanding of these issues and agrees with the plan.    3. Hyperlipidemia - On statin therapy.    4. Prevention:  Opthalmology-Yes: annually  Dental-Yes: twice a year  ASA-Yes, 81 mg qd   Smoking- No    Most Recent Immunizations   Administered Date(s) Administered     COVID-19,PF,Vi 03/25/2021     DTaP, Unspecified 11/08/2010     Flu, Unspecified 11/15/2016     L1d8-23 Novel Flu 11/04/2009     HepA-Adult 04/17/2019     Influenza (H1N1) 11/04/2009     Influenza (IIV3) PF 10/01/2012     Influenza Quad, Recombinant, p-free (RIV4) 11/04/2020     Influenza Vaccine IM > 6 months Valent IIV4 09/07/2017     Influenza Vaccine, 6+MO IM (QUADRIVALENT W/PRESERVATIVES) 11/15/2016     MMR 03/21/1995     Pneumococcal 23 valent 10/05/2012     TD (ADULT, 7+) 03/21/1995     TDAP Vaccine (Adacel) 11/08/2010     Typhoid IM 10/16/2018     Zoster vaccine recombinant adjuvanted (SHINGRIX) 12/03/2020     Total time spent in with the patient evaluation:  20 min    Additional time spent reviewing pertinent lab tests and chart notes, and documentation: 10 min    Follow-up:  3 months.    Cindi Meraz MD  Endocrinology   [facility] [employer] [facility]  8/24/2021  CC: Lisa Pierre          Again, thank you for allowing me to participate in the care of your patient.        Sincerely,        Cindi Meraz MD

## 2021-08-24 NOTE — PROGRESS NOTES
"THIS IS A VIDEO VISIT:    Phone call visit/virtual visit encounter:  [place] from Diana Butler.    Name of patient: Steve Morgan    Date of encounter: 8/24/2021    Time of start of video visit: 2:26    Video started: 2: 38    Video ended: 3:04    Provider location: working from [place]    Patient location: patients home.    Mode of transmission: video/ Doximity    Verbal consent: obtained before starting visit. Pt is agreeable.      The patient has been notified of following:      \"This VIDEO visit will be conducted via a call between you and your physician/provider. We have found that certain health care needs can be provided without the need for a physical exam.  This service lets us provide the care you need with a short phone conversation.  If a prescription is necessary we can send it directly to your pharmacy.  If lab work is needed we can place an order for that and you can then stop by our lab to have the test done at a later time.     With new updates with corona virus patient might be billed as clinic visit.     If during the course of the call the physician/provider feels a telephone visit is not appropriate, you will not be charged for this service.\"      Past medical history, social history, family history, allergy and medications were reviewed and updated as appropriate.  Reviewed pertinent labs, notes, imaging studies personally.  Total time spent > 45 min.    Name: Steve \"Caio\" Cathy  F/u for Diabetes.  HPI:  For f/u of DM.    has a past medical history of Cellulitis and abscess of trunk (6/27/2017), Depression, Hyperlipidemia LDL goal <100 (10/31/2010), Hypertension goal BP (blood pressure) < 140/90 (3/17/2011), Hypothyroidism (1/12/2010), Morbid obesity due to excess calories (H) (1/12/2010), Neuropathy in diabetes (H) (1/12/2010), Obesity (1/12/2010), Sleep apnea (1/12/2010), and Type 2 diabetes, HbA1C goal < 8% (H) (3/8/2011).    A1c increased to 9.7%.  He reports that he was " forgetting to take oral medications and NOVOLOG.  He is no longer taking Novolog- he got in habit of NOT taking it and eventually stopped taking it.  Stopped taking it X 4-6 months.  Using nina.  1. Type 2 DM:  Orginally diagnosed at the age of 33 or 38.  Was prediabetic prior, was not particularly symptomatic at the time, was noted on routine lab work    Current Regimen: Metformin 1000 mg BID, Glipizide XL 20 mg/day, Basaglar 32 units at night, Novolog 1:9 + 1:50>150 ( about 40 units/day).      The plan was to start Trulicity but the copay was not affordable ($100 per refill).    yes:     Diabetes Medication(s)     Biguanides       metFORMIN (GLUCOPHAGE) 1000 MG tablet    TAKE 1 TABLET BY MOUTH TWICE A DAY WITH MEALS    Insulin       insulin glargine (BASAGLAR KWIKPEN) 100 UNIT/ML pen    INJECT 30 UNITS UNDER THE SKIN ONCE DAILY. INCREASE AS DIRECTED TO MAX DOSE OF 45 UNITS     NOVOLOG FLEXPEN 100 UNIT/ML soln    TAKE 5 UNITS BEFORE EACH MEAL. INCREASE AS DIRECTED UP TO 45 UNITS PER DAY.    Sulfonylureas       glipiZIDE (GLUCOTROL XL) 10 MG 24 hr tablet    TAKE 2 TABLETS BY MOUTH EVERY DAY        BS checks: nina  Nina Download: Not checking.  Unable to use glucose meter - he has a hard time doing fingerstick testing due to his tremor.    Complications:   Diabetes Complications  Description / Detail    Diabetic Retinopathy  No retinopathy,last exam 2/2020, [facility]   CAD / PAD  No   Neuropathy  Yes + diabetic neuropathy: numbness hands and feet.  Saw podiatry, Dr. Mathis, 1/27/2018- using diabetic shoes   Nephropathy / Microalbuminuria  Yes, elevated urine microalbumin   Gastroparesis  No   Hypoglycemia [place]  Not sure, gets 'kind of dizzy' when blood sugar is low but reports history of low blood sugars without symptoms     2. Hypertension: Blood Pressure:   BP Readings from Last 3 Encounters:   08/09/21 (!) 148/80   07/30/21 (!) 157/78   07/19/21 118/64   Blood pressure medications include atenolol 25 mg qd  and losartan 50 mg every day.   3. Hyperlipidemia: Takes simvastatin 20 mg for lipid control.       4.  Hypothyroidism. Currently treated with levoxyl (KAMERON) 137 mcg daily. Takes the levoxyl first thing in the morning and waits 30+ minutes before eating breakfast. PA for Levoxyl approved through 4/7/2021.  PMH/PSH:  Past Medical History:   Diagnosis Date     Cellulitis and abscess of trunk 6/27/2017     Depression      Hyperlipidemia LDL goal <100 10/31/2010     Hypertension goal BP (blood pressure) < 140/90 3/17/2011     Hypothyroidism 1/12/2010     Morbid obesity due to excess calories (H) 1/12/2010     Neuropathy in diabetes (H) 1/12/2010     Obesity 1/12/2010     Sleep apnea 1/12/2010    CPAP     Type 2 diabetes, HbA1C goal < 8% (H) 3/8/2011     Past Surgical History:   Procedure Laterality Date     BIOPSY       COLONOSCOPY       EYE SURGERY       GENITOURINARY SURGERY      surg for undescended testicle     REPAIR HAMMER TOE BILATERAL  5/16/2013    Procedure: REPAIR HAMMER TOE BILATERAL;  Flexor Tenotomy Toes 2,3,4,5 Bilateral Feet;  Surgeon: Saad Bangura DPM;  Location: [facility] OR     Family Hx:  Family History   Problem Relation Age of Onset     Breast Cancer Mother      Hypertension Mother      Thyroid Disease Mother      Depression Mother      Alzheimer Disease Mother 82     Cancer - colorectal Father      Thyroid Disease Father      Depression Father      Other - See Comments Father         bladder polyps     Heart Disease Maternal Grandmother         CHF     Circulatory Paternal Grandmother      Cancer Paternal Grandfather      Diabetes Brother      Diabetes Brother      Asthma Brother      Thyroid disease: Yes: mother         DM2: Yes: one brother with type 1 diabetes and one brother with type 2 diabetes, paternal aunt with DM2         Autoimmune: DM1, SLE, RA, Vitiligo Yes: brother with DM1    Social Hx:  Social History     Socioeconomic History     Marital status:      Spouse name: Sri      Number of children: 2     Years of education: Not on file     Highest education level: Associate degree: occupational, technical, or vocational program   Occupational History     Occupation:      Employer: NONE      Comment: [employer]   Tobacco Use     Smoking status: Never Smoker     Smokeless tobacco: Never Used   Substance and Sexual Activity     Alcohol use: Yes     Comment: Occassionally     Drug use: No     Sexual activity: Not Currently     Partners: Female     Birth control/protection: Abstinence   Other Topics Concern     Parent/sibling w/ CABG, MI or angioplasty before 65F 55M? No   Social History Narrative     Not on file     Social Determinants of Health     Financial Resource Strain: Low Risk      Difficulty of Paying Living Expenses: Not hard at all   Food Insecurity: No Food Insecurity     Worried About Running Out of Food in the Last Year: Never true     Ran Out of Food in the Last Year: Never true   Transportation Needs: No Transportation Needs     Lack of Transportation (Medical): No     Lack of Transportation (Non-Medical): No   Physical Activity:      Days of Exercise per Week:      Minutes of Exercise per Session:    Stress:      Feeling of Stress :    Social Connections:      Frequency of Communication with Friends and Family:      Frequency of Social Gatherings with Friends and Family:      Attends Church Services:      Active Member of Clubs or Organizations:      Attends Club or Organization Meetings:      Marital Status:    Intimate Partner Violence:      Fear of Current or Ex-Partner:      Emotionally Abused:      Physically Abused:      Sexually Abused:           MEDICATIONS:  has a current medication list which includes the following prescription(s): ammonium lactate, ammonium lactate, aspirin, atenolol, bupropion, calcium carb-cholecalciferol, citalopram, freestyle juan 14 day reader, freestyle juan 14 day sensor, cyanocobalamin, famotidine, ferrous sulfate,  finasteride, glipizide, hydrocortisone, insulin glargine, b-d u/f, levoxyl, losartan, metformin, multi-vitamin, mupirocin, mupirocin, novolog flexpen, nystatin, pantoprazole, simvastatin, and cholecalciferol.    ROS     ROS: 10 point ROS neg other than the symptoms noted above in the HPI.    PE:  There were no vitals taken for this visit.  GENERAL: healthy, alert and no distress  EYES: Eyes grossly normal to inspection, conjunctivae and sclerae normal  ENT: no nose swelling, nasal discharge.  Thyroid: no apparent thyroid nodules  RESP: no audible wheeze, cough, or visible cyanosis.  No visible retractions or increased work of breathing.  Able to speak fully in complete sentences.  ABDO: not evaluated.  EXTREMITIES: no hand tremors.  NEURO: Cranial nerves grossly intact, mentation intact and speech normal  SKIN: No apparent skin lesions, rash or edema seen   PSYCH: mentation appears normal, affect normal/bright, judgement and insight intact, normal speech and appearance well-groomed    LABS:    Component      Latest Ref Rng & Units 8/29/2019   Glucose 316   C-Peptide      0.9 - 6.9 ng/mL 2.5     A1c:  Lab Results   Component Value Date    A1C 9.7 07/30/2021    A1C 7.4 02/22/2021    A1C 9.9 03/12/2020    A1C 11.7 12/05/2019    A1C 11.6 08/29/2019    A1C 11.8 12/14/2018       Basic Metabolic Panel:  Creatinine   Date Value Ref Range Status   07/30/2021 1.32 (H) 0.66 - 1.25 mg/dL Final   01/16/2020 1.01 0.66 - 1.25 mg/dL Final     Urine microabumin:  Lab Results   Component Value Date    UMALCR 120.00 07/30/2021    UMALCR 81.46 03/05/2020       LFTS/Cholesterol Panel:  Recent Labs   Lab Test 07/30/21  0708 03/05/20  0857 05/21/15  1104 04/19/14  0802   CHOL 162 139 137 122   HDL 31* 33* 30* 23*   LDL 98 88 78 71   TRIG 163* 89 144 141   CHOLHDLRATIO  --   --  4.6 5.3*       Thyroid Function:  ENDO THYROID LABS-[facility] Latest Ref Rng & Units 7/30/2021   TSH 0.40 - 4.00 mU/L 1.85   FREE T3 2.3 - 4.2 pg/mL    T4 0.76 - 1.46  ng/dL 1.19     Component    Latest Ref Rng & Units 10/19/2018   Thyroid Peroxidase Antibody    <35 IU/mL 11   Thyroglobulin Antibody    <40 IU/mL <20   Thyroid Stim Immunog    <=1.3 TSI index <1.0     All pertinent notes, labs, and images personally reviewed by me.     A/P  Mr.Walter Morgan is a 61 year old evaluated via phone visit for the management of:    1. DM2 - Under good control. A1c increased to 9.7%.  Diabetes is complicated by neuropathy and nephropathy/elevated urine microalbumin.  He has challenges with blood sugar testing due to limited use of his hands (neuropathy + tremor - both significant) and increasing difficulty with ADL's   Can't test his blood sugars using a meter due to the above.   He also has an extremely hard time inserting the Nina sensors due to neuropathy and tremor.  Is not complainant with regimen. Forgetting oral medications.  Not using Novolog currently- last visit 3-4 months back.  Using nina but limited data available.  Plan:  Discussed diagnosis, pathophysiology, management and treatment options of condition with pt.  I also discussed importance of strict blood sugar control to prevent complications associated with uncontrolled diabetes.    Continue Metformin 1000 mg BID.  Continue Glipizide XL 20 mg/day.  Decrease Basaglar to 30 units at night. ( in the setting of low BG episodes).  Restart  Novolog 5 units at each meal.  Follow up with [facility] at [facility] in 4 weeks.  Continue to use nina.  Scan often.  Repeat labs in 3 months.  Please make a lab appointment for blood work and follow up clinic appointment in 1 week after that to discuss results.    2. Hypothyroidism.  Currently treated with Levoxyl (KAMERON) 137 mcg/day.  Clinically and biochemically euthyroid.   Labs in range.  Plan:  Discussed diagnosis, pathophysiology, management and treatment options of condition with pt.  Continue Levoxyl 137 mcg/day.   Labs in 1 year or sooner if concerns.    Discussed s/s of  hypothyroidism and hyperthyroidism to watch for.  The patient indicates understanding of these issues and agrees with the plan.    3. Hyperlipidemia - On statin therapy.    4. Prevention:  Opthalmology-Yes: annually  Dental-Yes: twice a year  ASA-Yes, 81 mg qd   Smoking- No    Most Recent Immunizations   Administered Date(s) Administered     COVID-19,PF,Vi 03/25/2021     DTaP, Unspecified 11/08/2010     Flu, Unspecified 11/15/2016     E4v5-58 Novel Flu 11/04/2009     HepA-Adult 04/17/2019     Influenza (H1N1) 11/04/2009     Influenza (IIV3) PF 10/01/2012     Influenza Quad, Recombinant, p-free (RIV4) 11/04/2020     Influenza Vaccine IM > 6 months Valent IIV4 09/07/2017     Influenza Vaccine, 6+MO IM (QUADRIVALENT W/PRESERVATIVES) 11/15/2016     MMR 03/21/1995     Pneumococcal 23 valent 10/05/2012     TD (ADULT, 7+) 03/21/1995     TDAP Vaccine (Adacel) 11/08/2010     Typhoid IM 10/16/2018     Zoster vaccine recombinant adjuvanted (SHINGRIX) 12/03/2020     Total time spent in with the patient evaluation:  20 min    Additional time spent reviewing pertinent lab tests and chart notes, and documentation: 10 min    Follow-up:  3 months.    Cindi Meraz MD  Endocrinology   [facility]  8/24/2021  CC: Lisa Pierre

## 2021-08-27 ENCOUNTER — HOSPITAL ENCOUNTER (OUTPATIENT)
Dept: WOUND CARE | Facility: CLINIC | Age: 61
Discharge: HOME | End: 2021-08-27
Attending: PHYSICIAN ASSISTANT | Admitting: PHYSICIAN ASSISTANT
Payer: MEDICARE

## 2021-08-27 VITALS
TEMPERATURE: 97.3 F | RESPIRATION RATE: 18 BRPM | DIASTOLIC BLOOD PRESSURE: 74 MMHG | HEART RATE: 72 BPM | SYSTOLIC BLOOD PRESSURE: 144 MMHG

## 2021-08-27 DIAGNOSIS — L98.492 ULCER OF FINGER, WITH FAT LAYER EXPOSED (H): Primary | ICD-10-CM

## 2021-08-27 DIAGNOSIS — S61.212A: ICD-10-CM

## 2021-08-27 PROCEDURE — 99214 OFFICE O/P EST MOD 30 MIN: CPT | Performed by: PHYSICIAN ASSISTANT

## 2021-08-27 PROCEDURE — 97602 WOUND(S) CARE NON-SELECTIVE: CPT

## 2021-08-27 NOTE — PROGRESS NOTES
"[facility]    ASSESSMENT:   1. (L97.026) Ulcer of finger, with fat layer exposed     PLAN/DISCUSSION:   1. Wound care plan: secure piece of silvercel and change daily and PRN   2. Its becoming apparent that Caio will continue to have issues with sores off and on due to his neuropathy. Seems to do best with Silvercel dressings. Previously using an ointment and things became quite macerated. He can utilize silvercel and tape or bandaids prn     HISTORY OF PRESENT ILLNESS:   Steve Morgan is a 61 year old male with poorly controlled DM, hypothyroidism, DAMIÁN, PAD, BPH, vit B12 deficiency, and obesity who returns today for a right third dorsal digit ulcer. This was a result of cutting his finger on a tin of cat food around January. Fortunately this ulceration has healed since the last time I saw him. However, he now has cuts on his left 1st, left 4th, right 1st and right 3rd finger. These are all also from cat food cans. He reports that he then \"chews\" on his fingers.     He was first seen in our clinic by Dr. Holley on 5/24 who felt that he had adequate arterial perfusion.     Returns for follow-up.  Last month's wounds are all healed or healing. He has two new blisters on his hands that burst that he thinks were from the weed ortega.    Wound (used by OP [facility] only) 07/30/21 1008 Right laceration (Active)   Thickness/Stage full thickness 08/27/21 1240   Dressing Appearance moist drainage 08/27/21 1240   Base granulating 08/27/21 1240   Periwound intact 08/27/21 1240   Periwound Temperature warm 08/27/21 1240   Periwound Skin Turgor soft 08/27/21 1240   Length (cm) 0.1 08/27/21 1240   Width (cm) 1 08/27/21 1240   Depth (cm) 0 08/27/21 1240   Wound (cm^2) 0.1 cm^2 08/27/21 1240   Wound Volume (cm^3) 0 cm^3 08/27/21 1240   Wound healing % 96.3 08/27/21 1240   Drainage Characteristics/Odor serosanguineous 08/27/21 1240   Drainage Amount moderate 08/27/21 1240   Care, Wound non-select wound debridement " performed 08/27/21 1240           MDM: 30-39 minutes were spent on the date of the visit reviewing previous chart notes, evaluating patient and developing the treatment plan, this excludes any time spent on procedures.       JON ZARATE PA-C

## 2021-08-27 NOTE — DISCHARGE INSTRUCTIONS
[facility]  [address] [address]    Call us at [phone] if you have any questions about your wounds, have redness or swelling around your wound, have a fever of 101 or greater or if you have any other problems or concerns. We answer the phone Monday through Friday 8 am to 4 pm, please leave a message as we check the voicemail frequently throughout the day.     Steve Morgan      1960    Wound care recommendations to open areas on right and left finger open areas:  Wash hands with soap and water.   Apply Silvercel to wound bed and secure with Bandaid  Change daily and as needed when wet.     Paulina Abarca PA-C. August 27, 2021    Telephone visit as scheduled; please send pictures to [phone]  If you had a positive experience please indicate on your patient satisfaction survey form that [facility] will be sending you.    If you have any billing related questions please call the [facility] Business office at [phone]. The clinic staff does not handle billing related matters.

## 2021-08-29 ENCOUNTER — MYC MEDICAL ADVICE (OUTPATIENT)
Dept: FAMILY MEDICINE | Facility: CLINIC | Age: 61
End: 2021-08-29

## 2021-08-29 DIAGNOSIS — I10 ESSENTIAL HYPERTENSION WITH GOAL BLOOD PRESSURE LESS THAN 140/90: ICD-10-CM

## 2021-08-29 DIAGNOSIS — K21.00 GASTROESOPHAGEAL REFLUX DISEASE WITH ESOPHAGITIS WITHOUT HEMORRHAGE: ICD-10-CM

## 2021-08-29 DIAGNOSIS — Z12.12 SCREENING FOR COLORECTAL CANCER: Primary | ICD-10-CM

## 2021-08-29 DIAGNOSIS — Z12.11 SCREENING FOR COLORECTAL CANCER: Primary | ICD-10-CM

## 2021-08-30 ENCOUNTER — ALLIED HEALTH/NURSE VISIT (OUTPATIENT)
Dept: FAMILY MEDICINE | Facility: CLINIC | Age: 61
End: 2021-08-30
Payer: MEDICARE

## 2021-08-30 VITALS — SYSTOLIC BLOOD PRESSURE: 150 MMHG | DIASTOLIC BLOOD PRESSURE: 82 MMHG

## 2021-08-30 DIAGNOSIS — I10 BENIGN ESSENTIAL HYPERTENSION: Primary | ICD-10-CM

## 2021-08-30 PROCEDURE — 99207 PR NO CHARGE NURSE ONLY: CPT

## 2021-08-30 RX ORDER — ONDANSETRON 4 MG/1
4 TABLET, FILM COATED ORAL EVERY 8 HOURS PRN
Qty: 10 TABLET | Refills: 3 | Status: SHIPPED | OUTPATIENT
Start: 2021-08-30 | End: 2021-09-13

## 2021-08-30 NOTE — TELEPHONE ENCOUNTER
"Spoke to pt and mycharted msg the numbers for the referrals.    Pt also spoke about feeling \"fuzzy/dizzy/out of sorts\" noted pt had a high bp on 08/27.  Pt will stop by here today to check his BP.    He also states his sugar has been running in the 90's.    Pt has refills for his famotine states that works.  He would like a Rx for nausea since he is experiencing more nausea lately.      Jeannie CARVAJAL RN, BSN, PAL (Patient Advocate Liaison)  [facility]   [phone]    "

## 2021-08-30 NOTE — PROGRESS NOTES
Steve Morgan is a 61 year old year old patient who comes in today for a Blood Pressure check because of ongoing blood pressure monitoring.  Vital Signs as repeated by /80  Patient is taking medication as prescribed  Patient is tolerating medications well.  Patient is not monitoring Blood Pressure at home.    Current complaints: light-headedness and feeling out of sorts  Disposition:  huddled with provider Moon VÁZQUEZ.  Advised to follow-up with provider for medications changes.    Jeannie CARVAJAL RN, BSN, PAL (Patient Advocate Liaison)  [facility]   [phone]

## 2021-08-30 NOTE — TELEPHONE ENCOUNTER
Pt came to clinic.  His BP is 150/82.  On 08/27 his BP was 144/74.  See below tele msg for symptoms.      Jeannie CARVAJAL RN, BSN, PAL (Patient Advocate Liaison)  [facility]   [phone]

## 2021-08-31 RX ORDER — LOSARTAN POTASSIUM 100 MG/1
100 TABLET ORAL DAILY
Qty: 90 TABLET | Refills: 1 | Status: SHIPPED | OUTPATIENT
Start: 2021-08-31

## 2021-08-31 NOTE — TELEPHONE ENCOUNTER
Called to pt and let him know.    Jeannie CARVAJAL RN, BSN, PAL (Patient Advocate Liaison)  [facility]   [phone]

## 2021-09-09 ENCOUNTER — TELEPHONE (OUTPATIENT)
Dept: PSYCHOLOGY | Facility: CLINIC | Age: 61
End: 2021-09-09

## 2021-09-09 NOTE — TELEPHONE ENCOUNTER
Reason for call: Patient was called due to not presenting for today's appointment.    Outcome: Patient was informed via voice message of his next appointment and today's missed appointment.    Follow-up: Patient has follow up appointment on 9/23/201    SAURABH Domínguez

## 2021-09-13 ENCOUNTER — TELEPHONE (OUTPATIENT)
Dept: FAMILY MEDICINE | Facility: CLINIC | Age: 61
End: 2021-09-13

## 2021-09-13 DIAGNOSIS — K21.00 GASTROESOPHAGEAL REFLUX DISEASE WITH ESOPHAGITIS WITHOUT HEMORRHAGE: ICD-10-CM

## 2021-09-13 RX ORDER — ONDANSETRON 4 MG/1
4 TABLET, FILM COATED ORAL EVERY 8 HOURS PRN
Qty: 30 TABLET | Refills: 3 | Status: SHIPPED | OUTPATIENT
Start: 2021-09-13 | End: 2022-04-15

## 2021-09-13 NOTE — TELEPHONE ENCOUNTER
Called to pt and let him know.  Jeannie Duran. RN, BSN, PAL (Patient Advocate Liaison)  [facility]   [phone]

## 2021-09-13 NOTE — TELEPHONE ENCOUNTER
S-(situation):pt will be going to [place] for 2 weeks leaving on 10/2/21. Worried about his GI symptoms see mychart encounter on 08/29/21.  Pt will be seeing GI for colonoscopy and egd on 10/21.      B-(background):pt given a rx of zofran that pt has been taking along with Pepcid at night and it is working.    A-(assessment): pt states good relief of nausea with this regime of zofran/pepcid nightly.  Pt would like a Rx to tide him over until after his trip.    R-(recommendations): will forward to Lisa Pierre PA-C for advice.

## 2021-09-13 NOTE — TELEPHONE ENCOUNTER
I'd rather Caio take the pantoprazole 40 mg twice daily, along with the famotidine 40 mg and try not to use the Zofran daily if he can get by without it.     If he needs it, it's okay but not ideal.    I have sent in more Zofran.    Lisa Pierre PA-C

## 2021-09-14 ENCOUNTER — ALLIED HEALTH/NURSE VISIT (OUTPATIENT)
Dept: FAMILY MEDICINE | Facility: CLINIC | Age: 61
End: 2021-09-14
Payer: MEDICARE

## 2021-09-14 DIAGNOSIS — Z23 NEED FOR PROPHYLACTIC VACCINATION AND INOCULATION AGAINST INFLUENZA: Primary | ICD-10-CM

## 2021-09-14 PROCEDURE — 90682 RIV4 VACC RECOMBINANT DNA IM: CPT

## 2021-09-14 PROCEDURE — G0008 ADMIN INFLUENZA VIRUS VAC: HCPCS

## 2021-09-14 PROCEDURE — 99207 PR NO CHARGE NURSE ONLY: CPT

## 2021-09-21 ENCOUNTER — VIRTUAL VISIT (OUTPATIENT)
Dept: EDUCATION SERVICES | Facility: CLINIC | Age: 61
End: 2021-09-21
Attending: INTERNAL MEDICINE
Payer: MEDICARE

## 2021-09-21 DIAGNOSIS — E11.40 TYPE 2 DIABETES MELLITUS WITH DIABETIC NEUROPATHY, WITHOUT LONG-TERM CURRENT USE OF INSULIN (H): ICD-10-CM

## 2021-09-21 PROCEDURE — G0108 DIAB MANAGE TRN  PER INDIV: HCPCS | Mod: 95 | Performed by: DIETITIAN, REGISTERED

## 2021-09-21 NOTE — PATIENT INSTRUCTIONS
Care Plan:  Take insulin as directed, try to avoid any missed doses. Please upload your Nina reader prior each appointment.    Consider joining Weight Watchers or Cheggin, recommend resume use of [email or web address] Pal for now.    Gradually increase exercise, set small realistic goals.    Diabetes Support Resources:  Websites: American Diabetes Association: www.diabetes.org    Follow up:  Video call Oct. 19th at 2:00.   Please call, e-mail,  or send [employer] message with questions, concerns or if follow-up is needed.    Lucía Chávez RD, LD, [facility]  Diabetes     [facility] Diabetes Education and Nutrition Services for the [facility]:    For Your Diabetes or Nutrition Education Appointments   Call: [phone]   For Diabetes or Nutrition Related Questions   Call: [phone]

## 2021-09-21 NOTE — PROGRESS NOTES
"Diabetes Self-Management Education & Support    Presents for: Individual review    Type of service:  Video Visit    If the video visit is dropped, the video visit invitation should be resent by: Send to e-mail at: [email or web address]    Originating Location (pt. Location): Home  Distant Location (provider location): Home  Mode of Communication:  Video Conference via Horizontal Systems    Video Start Time: 3:00  Video End Time (time video stopped): 3:40    How would patient like to obtain AVS? Nerishart      SUBJECTIVE/OBJECTIVE:  Presents for: Individual review  Accompanied by: Self  Diabetes education in the past 24mo: No  Focus of Visit: CGM, Assistance w/ making life changes, Healthy Eating, Problem Solving, Taking Medication  Diabetes type: Type 2  Disease course: Worsening  How confident are you filling out medical forms by yourself:: Not Assessed  Transportation concerns: No  Other concerns:: Physical impairment  Cultural Influences/Ethnic Background:  Other    Diabetes Symptoms & Complications:  Fatigue: Sometimes  Neuropathy: No  Polydipsia: Yes  Polyphagia: Yes  Polyuria: No  Visual change: No  Slow healing wounds: Yes  Autonomic neuropathy: No  CVA: No  Heart disease: No  Nephropathy: No  Peripheral neuropathy: Yes  Peripheral Vascular Disease: No  Retinopathy: No  Sexual dysfunction: Yes    Patient Problem List and Family Medical History reviewed for relevant medical history, current medical status, and diabetes risk factors.    Vitals:  There were no vitals taken for this visit.  Estimated body mass index is 41.1 kg/m  as calculated from the following:    Height as of 8/9/21: 1.651 m (5' 5\").    Weight as of 8/9/21: 112 kg (247 lb).   Last 3 BP:   BP Readings from Last 3 Encounters:   08/30/21 (!) 150/82   08/27/21 (!) 144/74   08/09/21 (!) 148/80       History   Smoking Status     Never Smoker   Smokeless Tobacco     Never Used       Labs:  Lab Results   Component Value Date    A1C 9.7 07/30/2021    A1C 7.4 " 02/22/2021     Lab Results   Component Value Date     01/16/2020     Lab Results   Component Value Date    LDL 98 07/30/2021    LDL 88 03/05/2020     HDL Cholesterol   Date Value Ref Range Status   03/05/2020 33 (L) >39 mg/dL Final     Direct Measure HDL   Date Value Ref Range Status   07/30/2021 31 (L) >=40 mg/dL Final     Comment:     0-19 years:       Greater than or equal to 45 mg/dL   Low: Less than 40 mg/dL   Borderline low: 40-44 mg/dL     20 years and older:   Female: Greater than or equal to 50 mg/dL   Male:   Greater than or equal to 40 mg/dL        ]  GFR Estimate   Date Value Ref Range Status   07/30/2021 57 (L) >60 mL/min/1.73m2 Final     Comment:     As of July 11, 2021, eGFR is calculated by the CKD-EPI creatinine equation, without race adjustment. eGFR can be influenced by muscle mass, exercise, and diet. The reported eGFR is an estimation only and is only applicable if the renal function is stable.   01/16/2020 79 >60 mL/min/[1.73_m2] Final     Comment:     Non  GFR Calc  Starting 12/18/2018, serum creatinine based estimated GFR (eGFR) will be   calculated using the Chronic Kidney Disease Epidemiology Collaboration   (CKD-EPI) equation.       GFR Estimate If Black   Date Value Ref Range Status   01/16/2020 >90 >60 mL/min/[1.73_m2] Final     Comment:      GFR Calc  Starting 12/18/2018, serum creatinine based estimated GFR (eGFR) will be   calculated using the Chronic Kidney Disease Epidemiology Collaboration   (CKD-EPI) equation.       Lab Results   Component Value Date    CR 1.32 07/30/2021    CR 1.01 01/16/2020     No results found for: MICROALBUMIN    Healthy Eating:  Healthy Eating Assessed Today: Yes  Cultural/Christianity diet restrictions?: No  Meal planning/habits: Avoiding sweets, Calorie counting, Carb counting, Smaller portions, Keeps food records  How many times a week on average do you eat food made away from home (restaurant/take-out)?: 2  Meals  include: Lunch, Dinner, Afternoon Snack, Evening Snack  Beverages: Water, Diet soda  Has patient met with a dietitian in the past?: Yes    Being Active:  Being Active Assessed Today: Yes  Exercise:: Currently not exercising  Barrier to exercise: Other, Safety    Monitoring:  Monitoring Assessed Today: Yes  Blood Glucose Meter: CGM  Times checking blood sugar at home (number): 5+  Times checking blood sugar at home (per): Day    Nina CGM report not available for today's visit. Pt reports BG this am 110 mg/dl.    Taking Medications:  Diabetes Medication(s)     Biguanides       metFORMIN (GLUCOPHAGE) 1000 MG tablet    TAKE 1 TABLET BY MOUTH TWICE A DAY WITH MEALS    Insulin       insulin glargine (BASAGLAR KWIKPEN) 100 UNIT/ML pen    INJECT 30 UNITS UNDER THE SKIN ONCE DAILY. INCREASE AS DIRECTED TO MAX DOSE OF 45 UNITS     NOVOLOG FLEXPEN 100 UNIT/ML soln    TAKE 5 UNITS BEFORE EACH MEAL. INCREASE AS DIRECTED UP TO 45 UNITS PER DAY. - not currently taking    Sulfonylureas       glipiZIDE (GLUCOTROL XL) 10 MG 24 hr tablet    TAKE 2 TABLETS BY MOUTH EVERY DAY          Taking Medication Assessed Today: Yes  Current Treatments: Diet, Insulin Injections, Oral Medication (taken by mouth)    Problem Solving:  Problem Solving Assessed Today: Yes  Is the patient at risk for hypoglycemia?: Yes  Hypoglycemia Frequency: Never    Reducing Risks:  Reducing Risks Assessed Today: No  CAD Risks: Diabetes Mellitus, Dyslipidemia, Family history, Hypertension, Obesity, Sedentary lifestyle  Has dilated eye exam at least once a year?: Yes  Sees dentist every 6 months?: Yes  Feet checked by healthcare provider in the last year?: Yes    Healthy Coping:  Healthy Coping Assessed Today: Yes  Emotional response to diabetes: Concern for health and well-being  Informal Support system:: Children, Family, Spouse  Stage of change: PREPARATION (Decided to change - considering how)  Support resources: Weight Management  Patient Activation Measure  Survey Score:  SHANNON Score (Last Two) 3/30/2017 1/15/2020   SHANNON Raw Score 29 36   Activation Score 52.9 75.5   SHANNON Level 2 4       Diabetes knowledge and skills assessment:   Patient is knowledgeable in diabetes management concepts related to: Healthy Eating, Being Active, Monitoring, Taking Medication, Problem Solving, Reducing Risks and Healthy Coping    Patient needs further education on the following diabetes management concepts: Healthy Eating, Being Active, Monitoring, Taking Medication, Problem Solving, Reducing Risks and Healthy Coping    Based on learning assessment above, most appropriate setting for further diabetes education would be: Individual setting.      INTERVENTION:    REPORTS:  Not available    Education provided today on:  AADE Self-Care Behaviors:  Healthy Eating: weight reduction and mindfulness, weight mgmt resources (eg Weight Watchers, Noom)  Being Active: describe appropriate activity program and precautions to take  Taking Medication: importance of adherence  Problem Solving: high blood glucose - causes, signs/symptoms, treatment and prevention, low blood glucose - causes, signs/symptoms, treatment and prevention, carrying a carbohydrate source at all times and safe travel  Healthy Coping: benefits of making appropriate lifestyle changes, professional counseling    Pt verbalized understanding of concepts discussed and recommendations provided today.       Education Materials Provided:  No new materials provided today    ASSESSMENT:  Pt did not upload Nina  for today's visit. Reports BG this am was 110 mg/dl and stable over-night. Pt admits currently not taking meal time insulin. Reinforced importance of adherence. Without CGM data, difficult to assess.   Pt mostly concerned about current weight, wanted to discuss resources, is thinking about Weight Watchers but doesn't want to join until after the New Year due to upcoming travel and holidays. Also recommend Noom which will focus on  behavior change, advised now is a good time to start as it may help with managing special occasion food challenges. For now, pt agreeable to resuming use of [employer] Pal and checking ADA web site for healthy eating ideas. Pt requesting further support around weight loss, follow up planned but will also want to address glycemic control and insulin adherence/ titration.    Lab Results   Component Value Date    A1C 9.7 07/30/2021    A1C 7.4 02/22/2021       PLAN  See Patient Instructions for co-developed, patient-stated behavior change goals.  AVS printed and provided to patient today. See Follow-Up section for recommended follow-up.    Lucía Chávez RD, [facility]  Diabetes     Time Spent: 40 minutes  Encounter Type: Individual    Any diabetes medication dose changes were made via the CDE Protocol and Collaborative Practice Agreement with the patient's endocrinology provider. A copy of this encounter was shared with the provider.

## 2021-09-21 NOTE — LETTER
"    9/21/2021         RE: Steve Morgan  [address]  [place] [address]        Dear Colleague,    Thank you for referring your patient, Steve Morgan, to the [facility]. Please see a copy of my visit note below.    Diabetes Self-Management Education & Support    Presents for: Individual review    Type of service:  Video Visit    If the video visit is dropped, the video visit invitation should be resent by: Send to e-mail at: [email or web address]    Originating Location (pt. Location): Home  Distant Location (provider location): Home  Mode of Communication:  Video Conference via Beautified Start Time: 3:00  Video End Time (time video stopped): 3:40    How would patient like to obtain AVS? MyChart      SUBJECTIVE/OBJECTIVE:  Presents for: Individual review  Accompanied by: Self  Diabetes education in the past 24mo: No  Focus of Visit: CGM, Assistance w/ making life changes, Healthy Eating, Problem Solving, Taking Medication  Diabetes type: Type 2  Disease course: Worsening  How confident are you filling out medical forms by yourself:: Not Assessed  Transportation concerns: No  Other concerns:: Physical impairment  Cultural Influences/Ethnic Background:  Other    Diabetes Symptoms & Complications:  Fatigue: Sometimes  Neuropathy: No  Polydipsia: Yes  Polyphagia: Yes  Polyuria: No  Visual change: No  Slow healing wounds: Yes  Autonomic neuropathy: No  CVA: No  Heart disease: No  Nephropathy: No  Peripheral neuropathy: Yes  Peripheral Vascular Disease: No  Retinopathy: No  Sexual dysfunction: Yes    Patient Problem List and Family Medical History reviewed for relevant medical history, current medical status, and diabetes risk factors.    Vitals:  There were no vitals taken for this visit.  Estimated body mass index is 41.1 kg/m  as calculated from the following:    Height as of 8/9/21: 1.651 m (5' 5\").    Weight as of 8/9/21: 112 kg (247 lb).   Last 3 BP:   BP Readings from " Last 3 Encounters:   08/30/21 (!) 150/82   08/27/21 (!) 144/74   08/09/21 (!) 148/80       History   Smoking Status     Never Smoker   Smokeless Tobacco     Never Used       Labs:  Lab Results   Component Value Date    A1C 9.7 07/30/2021    A1C 7.4 02/22/2021     Lab Results   Component Value Date     01/16/2020     Lab Results   Component Value Date    LDL 98 07/30/2021    LDL 88 03/05/2020     HDL Cholesterol   Date Value Ref Range Status   03/05/2020 33 (L) >39 mg/dL Final     Direct Measure HDL   Date Value Ref Range Status   07/30/2021 31 (L) >=40 mg/dL Final     Comment:     0-19 years:       Greater than or equal to 45 mg/dL   Low: Less than 40 mg/dL   Borderline low: 40-44 mg/dL     20 years and older:   Female: Greater than or equal to 50 mg/dL   Male:   Greater than or equal to 40 mg/dL        ]  GFR Estimate   Date Value Ref Range Status   07/30/2021 57 (L) >60 mL/min/1.73m2 Final     Comment:     As of July 11, 2021, eGFR is calculated by the CKD-EPI creatinine equation, without race adjustment. eGFR can be influenced by muscle mass, exercise, and diet. The reported eGFR is an estimation only and is only applicable if the renal function is stable.   01/16/2020 79 >60 mL/min/[1.73_m2] Final     Comment:     Non  GFR Calc  Starting 12/18/2018, serum creatinine based estimated GFR (eGFR) will be   calculated using the Chronic Kidney Disease Epidemiology Collaboration   (CKD-EPI) equation.       GFR Estimate If Black   Date Value Ref Range Status   01/16/2020 >90 >60 mL/min/[1.73_m2] Final     Comment:      GFR Calc  Starting 12/18/2018, serum creatinine based estimated GFR (eGFR) will be   calculated using the Chronic Kidney Disease Epidemiology Collaboration   (CKD-EPI) equation.       Lab Results   Component Value Date    CR 1.32 07/30/2021    CR 1.01 01/16/2020     No results found for: MICROALBUMIN    Healthy Eating:  Healthy Eating Assessed Today:  Yes  Cultural/[employer] diet restrictions?: No  Meal planning/habits: Avoiding sweets, Calorie counting, Carb counting, Smaller portions, Keeps food records  How many times a week on average do you eat food made away from home (restaurant/take-out)?: 2  Meals include: Lunch, Dinner, Afternoon Snack, Evening Snack  Beverages: Water, Diet soda  Has patient met with a dietitian in the past?: Yes    Being Active:  Being Active Assessed Today: Yes  Exercise:: Currently not exercising  Barrier to exercise: Other, Safety    Monitoring:  Monitoring Assessed Today: Yes  Blood Glucose Meter: CGM  Times checking blood sugar at home (number): 5+  Times checking blood sugar at home (per): Day    Nina CGM report not available for today's visit. Pt reports BG this am 110 mg/dl.    Taking Medications:  Diabetes Medication(s)     Biguanides       metFORMIN (GLUCOPHAGE) 1000 MG tablet    TAKE 1 TABLET BY MOUTH TWICE A DAY WITH MEALS    Insulin       insulin glargine (BASAGLAR KWIKPEN) 100 UNIT/ML pen    INJECT 30 UNITS UNDER THE SKIN ONCE DAILY. INCREASE AS DIRECTED TO MAX DOSE OF 45 UNITS     NOVOLOG FLEXPEN 100 UNIT/ML soln    TAKE 5 UNITS BEFORE EACH MEAL. INCREASE AS DIRECTED UP TO 45 UNITS PER DAY. - not currently taking    Sulfonylureas       glipiZIDE (GLUCOTROL XL) 10 MG 24 hr tablet    TAKE 2 TABLETS BY MOUTH EVERY DAY          Taking Medication Assessed Today: Yes  Current Treatments: Diet, Insulin Injections, Oral Medication (taken by mouth)    Problem Solving:  Problem Solving Assessed Today: Yes  Is the patient at risk for hypoglycemia?: Yes  Hypoglycemia Frequency: Never    Reducing Risks:  Reducing Risks Assessed Today: No  CAD Risks: Diabetes Mellitus, Dyslipidemia, Family history, Hypertension, Obesity, Sedentary lifestyle  Has dilated eye exam at least once a year?: Yes  Sees dentist every 6 months?: Yes  Feet checked by healthcare provider in the last year?: Yes    Healthy Coping:  Healthy Coping Assessed Today:  Yes  Emotional response to diabetes: Concern for health and well-being  Informal Support system:: Children, Family, Spouse  Stage of change: PREPARATION (Decided to change - considering how)  Support resources: Weight Management  Patient Activation Measure Survey Score:  SHANNON Score (Last Two) 3/30/2017 1/15/2020   SHANNON Raw Score 29 36   Activation Score 52.9 75.5   SHANNON Level 2 4       Diabetes knowledge and skills assessment:   Patient is knowledgeable in diabetes management concepts related to: Healthy Eating, Being Active, Monitoring, Taking Medication, Problem Solving, Reducing Risks and Healthy Coping    Patient needs further education on the following diabetes management concepts: Healthy Eating, Being Active, Monitoring, Taking Medication, Problem Solving, Reducing Risks and Healthy Coping    Based on learning assessment above, most appropriate setting for further diabetes education would be: Individual setting.      INTERVENTION:    REPORTS:  Not available    Education provided today on:  AADE Self-Care Behaviors:  Healthy Eating: weight reduction and mindfulness, weight mgmt resources (eg Weight Watchers, Noom)  Being Active: describe appropriate activity program and precautions to take  Taking Medication: importance of adherence  Problem Solving: high blood glucose - causes, signs/symptoms, treatment and prevention, low blood glucose - causes, signs/symptoms, treatment and prevention, carrying a carbohydrate source at all times and safe travel  Healthy Coping: benefits of making appropriate lifestyle changes, professional counseling    Pt verbalized understanding of concepts discussed and recommendations provided today.       Education Materials Provided:  No new materials provided today    ASSESSMENT:  Pt did not upload Nina  for today's visit. Reports BG this am was 110 mg/dl and stable over-night. Pt admits currently not taking meal time insulin. Reinforced importance of adherence. Without CGM  data, difficult to assess.   Pt mostly concerned about current weight, wanted to discuss resources, is thinking about Weight Watchers but doesn't want to join until after the New Year due to upcoming travel and holidays. Also recommend Noom which will focus on behavior change, advised now is a good time to start as it may help with managing special occasion food challenges. For now, pt agreeable to resuming use of MyFitness Pal and checking ADA web site for healthy eating ideas. Pt requesting further support around weight loss, follow up planned but will also want to address glycemic control and insulin adherence/ titration.    Lab Results   Component Value Date    A1C 9.7 07/30/2021    A1C 7.4 02/22/2021       PLAN  See Patient Instructions for co-developed, patient-stated behavior change goals.  AVS printed and provided to patient today. See Follow-Up section for recommended follow-up.    Lucía Chávez RD, [facility]  Diabetes     Time Spent: 40 minutes  Encounter Type: Individual    Any diabetes medication dose changes were made via the CDE Protocol and Collaborative Practice Agreement with the patient's endocrinology provider. A copy of this encounter was shared with the provider.

## 2021-09-23 ENCOUNTER — TELEPHONE (OUTPATIENT)
Dept: FAMILY MEDICINE | Facility: CLINIC | Age: 61
End: 2021-09-23

## 2021-09-23 ASSESSMENT — PATIENT HEALTH QUESTIONNAIRE - PHQ9: SUM OF ALL RESPONSES TO PHQ QUESTIONS 1-9: 17

## 2021-09-23 NOTE — PROGRESS NOTES
Pre-Visit Planning   Next 5 appointments (look out 90 days)    Sep 24, 2021  7:15 AM  (Arrive by 6:55 AM)  Office Visit with London Escalera PA-C  [facility] ([facility] ) [address] [address] [phone]        Appointment Notes for this encounter:   Ear popping    Questionnaires Reviewed/Assigned  No additional questionnaires are needed    Patient preferred phone number: [phone]      Spoke to patient via phone. Are there any additional questions or concerns you'd like to review with your provider during your visit? No    Patient does not have additional questions or concerns.        Visit is not preventive.    Meds  Is there anything on your medication list that needs to be updated? No    Current Outpatient Medications   Medication     ammonium lactate (AMLACTIN) 12 % external cream     ammonium lactate (LAC-HYDRIN) 12 % cream     ASPIRIN 81 MG OR TABS     atenolol (TENORMIN) 25 MG tablet     buPROPion (WELLBUTRIN XL) 300 MG 24 hr tablet     Calcium Carb-Cholecalciferol (CALCIUM 600 + D PO)     citalopram (CELEXA) 40 MG tablet     Continuous Blood Gluc  (FREESTYLE GIULIANA 14 DAY READER) SIENA     Continuous Blood Gluc Sensor (FREESTYLE GIULIANA 14 DAY SENSOR) MISC     Cyanocobalamin (VITAMIN B 12 PO)     famotidine (PEPCID) 40 MG tablet     ferrous sulfate 325 (65 FE) MG tablet     finasteride (PROSCAR) 5 MG tablet     glipiZIDE (GLUCOTROL XL) 10 MG 24 hr tablet     hydrocortisone (WESTCORT) 0.2 % cream     insulin glargine (BASAGLAR KWIKPEN) 100 UNIT/ML pen     insulin pen needle (B-D U/F) 31G X 5 MM miscellaneous     LEVOXYL 137 MCG tablet     losartan (COZAAR) 100 MG tablet     metFORMIN (GLUCOPHAGE) 1000 MG tablet     MULTI-VITAMIN OR TABS     mupirocin (BACTROBAN) 2 % external ointment     mupirocin (BACTROBAN) 2 % external ointment     NOVOLOG FLEXPEN 100 UNIT/ML soln     nystatin (MYCOSTATIN) 598623 UNIT/GM external  "cream     ondansetron (ZOFRAN) 4 MG tablet     pantoprazole (PROTONIX) 40 MG EC tablet     simvastatin (ZOCOR) 20 MG tablet     VITAMIN D, CHOLECALCIFEROL, PO     No current facility-administered medications for this visit.     Which pharmacy do you prefer to use for medications during this visit if needed? [facility]    Do you need refills on any of your medications? No    Health Maintenance Due   Topic Date Due     DTAP/TDAP/TD IMMUNIZATION (3 - Td or Tdap) 11/08/2020     MEDICARE ANNUAL WELLNESS VISIT  01/16/2021     BMP  01/16/2021     DIABETIC FOOT EXAM  01/16/2021     COLORECTAL CANCER SCREENING  04/06/2021     EYE EXAM  08/04/2021     Patient is due for:  none needed  No appointment needed.  Call Summary  \"Thank you for your time today.  If anything comes up before your appointment, please feel free to contact us at [phone].\"  Jeannie Duran. RN, BSN, PAL (Patient Advocate Liaison)  [facility]   [phone]      "

## 2021-09-23 NOTE — TELEPHONE ENCOUNTER
Pt called states his ear is popping and he would like to be seen.  Pt states that he is going on vacation and does not want anything to interfere with it    His ear is popping only.  No pain, fever, drainage noted.  He states it feels like he got H2O in it.    Asked pt if he had allergies and that could be causing his ears to pop.  Asked pt to take an allergy pill and see if it helps.  Pt states he will try the allergy pill if it does work he will call back to me to cancel appt made with London VÁZQUEZ.  Otherwise he will be in the clinic tomorrow for his appt.    Jeannie Duran. RN, BSN, PAL (Patient Advocate Liaison)  [facility]   [phone]

## 2021-09-24 ENCOUNTER — OFFICE VISIT (OUTPATIENT)
Dept: FAMILY MEDICINE | Facility: CLINIC | Age: 61
End: 2021-09-24
Payer: MEDICARE

## 2021-09-24 ENCOUNTER — HOSPITAL ENCOUNTER (OUTPATIENT)
Dept: WOUND CARE | Facility: CLINIC | Age: 61
End: 2021-09-24
Attending: PHYSICIAN ASSISTANT
Payer: MEDICARE

## 2021-09-24 VITALS
RESPIRATION RATE: 16 BRPM | TEMPERATURE: 98.7 F | SYSTOLIC BLOOD PRESSURE: 148 MMHG | OXYGEN SATURATION: 97 % | WEIGHT: 241 LBS | HEART RATE: 71 BPM | HEIGHT: 65 IN | BODY MASS INDEX: 40.15 KG/M2 | DIASTOLIC BLOOD PRESSURE: 82 MMHG

## 2021-09-24 DIAGNOSIS — Z23 NEED FOR DIPHTHERIA-TETANUS-PERTUSSIS (TDAP) VACCINE: ICD-10-CM

## 2021-09-24 DIAGNOSIS — L98.492 ULCER OF FINGER, WITH FAT LAYER EXPOSED (H): ICD-10-CM

## 2021-09-24 DIAGNOSIS — H69.91 DYSFUNCTION OF RIGHT EUSTACHIAN TUBE: Primary | ICD-10-CM

## 2021-09-24 DIAGNOSIS — I10 ESSENTIAL HYPERTENSION WITH GOAL BLOOD PRESSURE LESS THAN 140/90: ICD-10-CM

## 2021-09-24 DIAGNOSIS — E11.40 TYPE 2 DIABETES MELLITUS WITH DIABETIC NEUROPATHY, WITH LONG-TERM CURRENT USE OF INSULIN (H): ICD-10-CM

## 2021-09-24 DIAGNOSIS — Z79.4 TYPE 2 DIABETES MELLITUS WITH DIABETIC NEUROPATHY, WITH LONG-TERM CURRENT USE OF INSULIN (H): ICD-10-CM

## 2021-09-24 PROCEDURE — 36415 COLL VENOUS BLD VENIPUNCTURE: CPT | Performed by: PHYSICIAN ASSISTANT

## 2021-09-24 PROCEDURE — 99442 PR PHYSICIAN TELEPHONE EVALUATION 11-20 MIN: CPT | Performed by: PHYSICIAN ASSISTANT

## 2021-09-24 PROCEDURE — 80048 BASIC METABOLIC PNL TOTAL CA: CPT | Performed by: PHYSICIAN ASSISTANT

## 2021-09-24 PROCEDURE — 99214 OFFICE O/P EST MOD 30 MIN: CPT | Mod: 25 | Performed by: PHYSICIAN ASSISTANT

## 2021-09-24 PROCEDURE — 90471 IMMUNIZATION ADMIN: CPT | Performed by: PHYSICIAN ASSISTANT

## 2021-09-24 PROCEDURE — 90715 TDAP VACCINE 7 YRS/> IM: CPT | Performed by: PHYSICIAN ASSISTANT

## 2021-09-24 PROCEDURE — 99207 PR FOOT EXAM NO CHARGE: CPT | Mod: 25 | Performed by: PHYSICIAN ASSISTANT

## 2021-09-24 RX ORDER — FLUTICASONE PROPIONATE 50 MCG
1 SPRAY, SUSPENSION (ML) NASAL 2 TIMES DAILY
Qty: 15.8 ML | Refills: 0 | Status: SHIPPED | OUTPATIENT
Start: 2021-09-24 | End: 2021-10-19

## 2021-09-24 ASSESSMENT — ANXIETY QUESTIONNAIRES
GAD7 TOTAL SCORE: 4
GAD7 TOTAL SCORE: 4
6. BECOMING EASILY ANNOYED OR IRRITABLE: NEARLY EVERY DAY
8. IF YOU CHECKED OFF ANY PROBLEMS, HOW DIFFICULT HAVE THESE MADE IT FOR YOU TO DO YOUR WORK, TAKE CARE OF THINGS AT HOME, OR GET ALONG WITH OTHER PEOPLE?: SOMEWHAT DIFFICULT
3. WORRYING TOO MUCH ABOUT DIFFERENT THINGS: NOT AT ALL
2. NOT BEING ABLE TO STOP OR CONTROL WORRYING: NOT AT ALL
4. TROUBLE RELAXING: NOT AT ALL
5. BEING SO RESTLESS THAT IT IS HARD TO SIT STILL: NOT AT ALL
7. FEELING AFRAID AS IF SOMETHING AWFUL MIGHT HAPPEN: SEVERAL DAYS
1. FEELING NERVOUS, ANXIOUS, OR ON EDGE: NOT AT ALL
GAD7 TOTAL SCORE: 4
7. FEELING AFRAID AS IF SOMETHING AWFUL MIGHT HAPPEN: SEVERAL DAYS

## 2021-09-24 ASSESSMENT — PATIENT HEALTH QUESTIONNAIRE - PHQ9
SUM OF ALL RESPONSES TO PHQ QUESTIONS 1-9: 16
SUM OF ALL RESPONSES TO PHQ QUESTIONS 1-9: 16
10. IF YOU CHECKED OFF ANY PROBLEMS, HOW DIFFICULT HAVE THESE PROBLEMS MADE IT FOR YOU TO DO YOUR WORK, TAKE CARE OF THINGS AT HOME, OR GET ALONG WITH OTHER PEOPLE: SOMEWHAT DIFFICULT

## 2021-09-24 ASSESSMENT — MIFFLIN-ST. JEOR: SCORE: 1815.05

## 2021-09-24 NOTE — PROGRESS NOTES
Patient called for a telephone visit. Certified Wound Care Nurse time spent evaluating patient record, completed a full evaluation and documented wound(s) & qasim-wound skin; provided recommendation based on treatment plan. Reviewed discharge instructions, patient education, and discussed plan of care with appropriate medical team staff members and patient and/or family members.     All concerns and questions addressed; education provided on skin and wound care with good teach back.

## 2021-09-24 NOTE — PATIENT INSTRUCTIONS
Continue to take the allergy medication daily.    Use Flonase nasal spray twice a day.    You should continue these until you leave to help avoid ear pain with flying.

## 2021-09-24 NOTE — DISCHARGE INSTRUCTIONS
[facility]  [address] [address]    Call us at [phone] if you have any questions about your wounds, have redness or swelling around your wound, have a fever of 101 or greater or if you have any other problems or concerns. We answer the phone Monday through Friday 8 am to 4 pm, please leave a message as we check the voicemail frequently throughout the day.     Steve Morgan      1960    Wound care recommendations to any open areas on fingers:  Wash hands with soap and water and apply cream.  Twice a Day: Apply moisturizing cream:  Unscented hand cream thick formula; Aquaphor healing ointment; Neutrogena Swedish Formula  At nighttime: apply cream and wear cotton gloves to bed  If wounds open or draining: Apply Silvercel to wound bed and secure with Bandaid and change daily     Please wear work gloves when you work, consider wearing mittens to keep your hands warm and protected.     Paulina Abarca PA-C. September 24, 2021      If you had a positive experience please indicate on your patient satisfaction survey form that [facility] will be sending you.    If you have any billing related questions please call the [facility] Business office at [phone]. The clinic staff does not handle billing related matters.

## 2021-09-24 NOTE — PROGRESS NOTES
"[facility]    ASSESSMENT:   1. (L98.492) Ulcer of fingers, with fat layer exposed     PLAN/DISCUSSION:   1. Apply bland hand lotion daily, recommended cotton gloves at night. Also recommend he wear work gloves when working outside.   2. Any open wounds: secure piece of silvercel and change daily and PRN   3. Its becoming apparent that Caio will continue to have issues with sores off and on due to his neuropathy. Seems to do best with Silvercel dressings. Previously using an ointment and things became quite macerated. He can utilize silvercel and tape or bandaids prn     HISTORY OF PRESENT ILLNESS:   Steve Morgan is a 61 year old male with poorly controlled DM, hypothyroidism, DAMIÁN, PAD, BPH, vit B12 deficiency, and obesity who returns today for a right third dorsal digit ulcer. This was a result of cutting his finger on a tin of cat food around January. Fortunately this ulceration has healed since the last time I saw him. However, he now has cuts on his left 1st, left 4th, right 1st and right 3rd finger. These are all also from cat food cans. He reports that he then \"chews\" on his fingers.     He was first seen in our clinic by Dr. Holley on 5/24 who felt that he had adequate arterial perfusion.     Returns for telemed follow-up.  Last month's wounds are all healed or healing. He new ulceration on his right 1st finger.     Wound (used by OP [facility] only) 07/30/21 1008 Right laceration (Active)   Thickness/Stage full thickness 09/24/21 1100   Dressing Appearance moist drainage 09/24/21 1100   Base scab;moist 09/24/21 1100   Periwound contracted 09/24/21 1100   Periwound Temperature warm 09/24/21 1100   Periwound Skin Turgor soft 09/24/21 1100   Length (cm) 0.1 09/24/21 1100   Width (cm) 0.8 09/24/21 1100   Depth (cm) 0 09/24/21 1100   Wound (cm^2) 0.08 cm^2 09/24/21 1100   Wound Volume (cm^3) 0 cm^3 09/24/21 1100   Wound healing % 97.04 09/24/21 1100   Drainage Characteristics/Odor " "serous;serosanguineous 09/24/21 1100   Drainage Amount scant 09/24/21 1100                The patient has been notified of following:     \"This telephone visit will be conducted via a call between you and your physician/provider. We have found that certain health care needs can be provided without the need for a physical exam.  This service lets us provide the care you need with a short phone conversation.  If a prescription is necessary we can send it directly to your pharmacy.  If lab work is needed we can place an order for that and you can then stop by our lab to have the test done at a later time.    If during the course of the call the physician/provider feels a telephone visit is not appropriate, you will not be charged for this service.\"     Patient has given verbal consent for Telephone visit?  Yes    DURATION OF CALL 15 minutes      JON ZARATE PA-C    "

## 2021-09-25 ASSESSMENT — ANXIETY QUESTIONNAIRES: GAD7 TOTAL SCORE: 4

## 2021-09-25 ASSESSMENT — PATIENT HEALTH QUESTIONNAIRE - PHQ9: SUM OF ALL RESPONSES TO PHQ QUESTIONS 1-9: 16

## 2021-10-07 LAB
ANION GAP SERPL CALCULATED.3IONS-SCNC: 4 MMOL/L (ref 3–14)
BUN SERPL-MCNC: 19 MG/DL (ref 7–30)
CALCIUM SERPL-MCNC: 9.2 MG/DL (ref 8.5–10.1)
CHLORIDE BLD-SCNC: 99 MMOL/L (ref 94–109)
CO2 SERPL-SCNC: 26 MMOL/L (ref 20–32)
CREAT SERPL-MCNC: 1.38 MG/DL (ref 0.66–1.25)
GFR SERPL CREATININE-BSD FRML MDRD: 54 ML/MIN/1.73M2
GLUCOSE BLD-MCNC: 138 MG/DL (ref 70–99)
POTASSIUM BLD-SCNC: 4.8 MMOL/L (ref 3.4–5.3)
SODIUM SERPL-SCNC: 129 MMOL/L (ref 133–144)

## 2021-10-16 DIAGNOSIS — H69.91 DYSFUNCTION OF RIGHT EUSTACHIAN TUBE: ICD-10-CM

## 2021-10-18 ENCOUNTER — TELEPHONE (OUTPATIENT)
Dept: FAMILY MEDICINE | Facility: CLINIC | Age: 61
End: 2021-10-18

## 2021-10-18 NOTE — TELEPHONE ENCOUNTER
Pt is having his endoscopy tomorrow.  Wants to know if it is ok for him to take his DM meds.  [facility] wanted PCP to answer this question.    Jeannie Duran. RN, BSN, PAL (Patient Advocate Liaison)  [facility]   [phone]

## 2021-10-18 NOTE — TELEPHONE ENCOUNTER
Called to pt and [facility] to let them know.    Jeannie Duran. RN, BSN, PAL (Patient Advocate Liaison)  [facility]   [phone]

## 2021-10-18 NOTE — TELEPHONE ENCOUNTER
Routing refill request to provider for review/approval because:  New for PCP, ? Short term rx from same day provider for eustachian tube, confirm ongoing refills  Lucía Bond RN, BSN  Message handled by CLINIC NURSE.

## 2021-10-18 NOTE — TELEPHONE ENCOUNTER
Please call pt and [facility].    Patient should hold all his meds. He can take them after his endoscopy tomorrow when he gets home.

## 2021-10-19 RX ORDER — FLUTICASONE PROPIONATE 50 MCG
1 SPRAY, SUSPENSION (ML) NASAL 2 TIMES DAILY
Qty: 16 ML | Refills: 3 | Status: SHIPPED | OUTPATIENT
Start: 2021-10-19 | End: 2022-12-20

## 2021-10-21 ENCOUNTER — TRANSFERRED RECORDS (OUTPATIENT)
Dept: HEALTH INFORMATION MANAGEMENT | Facility: CLINIC | Age: 61
End: 2021-10-21

## 2021-11-16 ENCOUNTER — VIRTUAL VISIT (OUTPATIENT)
Dept: FAMILY MEDICINE | Facility: CLINIC | Age: 61
End: 2021-11-16
Payer: MEDICARE

## 2021-11-16 VITALS — HEIGHT: 65 IN | BODY MASS INDEX: 40.82 KG/M2 | WEIGHT: 245 LBS

## 2021-11-16 DIAGNOSIS — F33.1 MODERATE EPISODE OF RECURRENT MAJOR DEPRESSIVE DISORDER (H): ICD-10-CM

## 2021-11-16 DIAGNOSIS — K57.30 DIVERTICULAR DISEASE OF COLON: ICD-10-CM

## 2021-11-16 DIAGNOSIS — K44.9 HIATAL HERNIA: ICD-10-CM

## 2021-11-16 DIAGNOSIS — K21.00 GASTROESOPHAGEAL REFLUX DISEASE WITH ESOPHAGITIS WITHOUT HEMORRHAGE: ICD-10-CM

## 2021-11-16 DIAGNOSIS — Z86.0100 HISTORY OF COLONIC POLYPS: ICD-10-CM

## 2021-11-16 PROCEDURE — 99214 OFFICE O/P EST MOD 30 MIN: CPT | Mod: 95 | Performed by: PHYSICIAN ASSISTANT

## 2021-11-16 RX ORDER — PANTOPRAZOLE SODIUM 40 MG/1
40 TABLET, DELAYED RELEASE ORAL DAILY
Qty: 90 TABLET | Refills: 1 | Status: SHIPPED | OUTPATIENT
Start: 2021-11-16 | End: 2022-06-15

## 2021-11-16 RX ORDER — CITALOPRAM HYDROBROMIDE 40 MG/1
40 TABLET ORAL DAILY
Qty: 90 TABLET | Refills: 1 | Status: SHIPPED | OUTPATIENT
Start: 2021-11-16 | End: 2022-04-15

## 2021-11-16 RX ORDER — FAMOTIDINE 40 MG/1
40 TABLET, FILM COATED ORAL DAILY
Qty: 90 TABLET | Refills: 1 | Status: SHIPPED | OUTPATIENT
Start: 2021-11-16 | End: 2022-04-15

## 2021-11-16 ASSESSMENT — ANXIETY QUESTIONNAIRES
8. IF YOU CHECKED OFF ANY PROBLEMS, HOW DIFFICULT HAVE THESE MADE IT FOR YOU TO DO YOUR WORK, TAKE CARE OF THINGS AT HOME, OR GET ALONG WITH OTHER PEOPLE?: NOT DIFFICULT AT ALL
1. FEELING NERVOUS, ANXIOUS, OR ON EDGE: NOT AT ALL
GAD7 TOTAL SCORE: 3
GAD7 TOTAL SCORE: 3
6. BECOMING EASILY ANNOYED OR IRRITABLE: SEVERAL DAYS
3. WORRYING TOO MUCH ABOUT DIFFERENT THINGS: NOT AT ALL
7. FEELING AFRAID AS IF SOMETHING AWFUL MIGHT HAPPEN: SEVERAL DAYS
7. FEELING AFRAID AS IF SOMETHING AWFUL MIGHT HAPPEN: SEVERAL DAYS
4. TROUBLE RELAXING: SEVERAL DAYS
2. NOT BEING ABLE TO STOP OR CONTROL WORRYING: NOT AT ALL
5. BEING SO RESTLESS THAT IT IS HARD TO SIT STILL: NOT AT ALL
GAD7 TOTAL SCORE: 3

## 2021-11-16 ASSESSMENT — PATIENT HEALTH QUESTIONNAIRE - PHQ9
SUM OF ALL RESPONSES TO PHQ QUESTIONS 1-9: 8
SUM OF ALL RESPONSES TO PHQ QUESTIONS 1-9: 8
10. IF YOU CHECKED OFF ANY PROBLEMS, HOW DIFFICULT HAVE THESE PROBLEMS MADE IT FOR YOU TO DO YOUR WORK, TAKE CARE OF THINGS AT HOME, OR GET ALONG WITH OTHER PEOPLE: NOT DIFFICULT AT ALL

## 2021-11-16 ASSESSMENT — MIFFLIN-ST. JEOR: SCORE: 1833.19

## 2021-11-16 NOTE — PROGRESS NOTES
Caio is a 61 year old who is being evaluated via a billable video visit.      How would you like to obtain your AVS? MyChart  If the video visit is dropped, the invitation should be resent by: Text to cell phone: [phone]  Will anyone else be joining your video visit? No    Video Start Time: 1508    Assessment & Plan     Moderate episode of recurrent major depressive disorder (H)  Stable.  - citalopram (CELEXA) 40 MG tablet; Take 1 tablet (40 mg) by mouth daily    Gastroesophageal reflux disease with esophagitis without hemorrhage    - pantoprazole (PROTONIX) 40 MG EC tablet; Take 1 tablet (40 mg) by mouth daily  - famotidine (PEPCID) 40 MG tablet; Take 1 tablet (40 mg) by mouth daily    Diverticular disease of colon  Discussed increased fiber    Hiatal hernia  Continue pantoprazole.     History of colonic polyps  Repeat 3 years.                Depression Screening Follow Up    PHQ 11/16/2021   PHQ-9 Total Score 8   Q9: Thoughts of better off dead/self-harm past 2 weeks Several days   F/U: Thoughts of suicide or self-harm No   F/U: Safety concerns No     Last PHQ-9 11/16/2021   1.  Little interest or pleasure in doing things 1   2.  Feeling down, depressed, or hopeless 1   3.  Trouble falling or staying asleep, or sleeping too much 1   4.  Feeling tired or having little energy 1   5.  Poor appetite or overeating 1   6.  Feeling bad about yourself 0   7.  Trouble concentrating 1   8.  Moving slowly or restless 1   Q9: Thoughts of better off dead/self-harm past 2 weeks 1   PHQ-9 Total Score 8   Difficulty at work, home, or with people -   In the past two weeks have you had thoughts of suicide or self harm? No   Do you have concerns about your personal safety or the safety of others? No         Passive SI      Follow Up      Follow Up Actions Taken  Patient declined referral.    Discussed the following ways the patient can remain in a safe environment:  be around others  See Patient Instructions    Return in about  6 months (around 5/16/2022).    Lisa Pierre PA-C  [facility] [place]    Subjective   Caio is a 61 year old who presents for the following health issues     History of Present Illness   He consumes 0 sweetened beverage(s) daily.He exercises with enough effort to increase his heart rate 9 or less minutes per day.  He exercises with enough effort to increase his heart rate 3 or less days per week. He is missing 2 dose(s) of medications per week.  He is not taking prescribed medications regularly due to remembering to take.       Concern - results  Description: of upper GI and colonoscopy    Depression and Anxiety Follow-Up    How are you doing with your depression since your last visit? No change    How are you doing with your anxiety since your last visit?  No change    Are you having other symptoms that might be associated with depression or anxiety? No    Have you had a significant life event? No     Do you have any concerns with your use of alcohol or other drugs? No    Social History     Tobacco Use     Smoking status: Never Smoker     Smokeless tobacco: Never Used   Vaping Use     Vaping Use: Never used   Substance Use Topics     Alcohol use: Yes     Comment: Occassionally     Drug use: No     PHQ 9/23/2021 9/24/2021 11/16/2021   PHQ-9 Total Score 17 16 8   Q9: Thoughts of better off dead/self-harm past 2 weeks Not at all Not at all Several days   F/U: Thoughts of suicide or self-harm - - No   F/U: Safety concerns - - No     JOSE MANUEL-7 SCORE 7/1/2021 9/24/2021 11/16/2021   Total Score - - -   Total Score 2 (minimal anxiety) 4 (minimal anxiety) 3 (minimal anxiety)   Total Score 2 4 3     Last PHQ-9 11/16/2021   1.  Little interest or pleasure in doing things 1   2.  Feeling down, depressed, or hopeless 1   3.  Trouble falling or staying asleep, or sleeping too much 1   4.  Feeling tired or having little energy 1   5.  Poor appetite or overeating 1   6.  Feeling bad about yourself 0   7.   Trouble concentrating 1   8.  Moving slowly or restless 1   Q9: Thoughts of better off dead/self-harm past 2 weeks 1   PHQ-9 Total Score 8   Difficulty at work, home, or with people -   In the past two weeks have you had thoughts of suicide or self harm? No   Do you have concerns about your personal safety or the safety of others? No     JOSE MANUEL-7  11/16/2021   1. Feeling nervous, anxious, or on edge 0   2. Not being able to stop or control worrying 0   3. Worrying too much about different things 0   4. Trouble relaxing 1   5. Being so restless that it is hard to sit still 0   6. Becoming easily annoyed or irritable 1   7. Feeling afraid, as if something awful might happen 1   JOSE MANUEL-7 Total Score 3   If you checked any problems, how difficult have they made it for you to do your work, take care of things at home, or get along with other people? -             Follow Up Actions Taken       Discussed the following ways the patient can remain in a safe environment:    Suicide Assessment Five-step Evaluation and Treatment (SAFE-T)            Review of Systems   Constitutional, HEENT, cardiovascular, pulmonary, gi and gu systems are negative, except as otherwise noted.      Objective           Vitals:  No vitals were obtained today due to virtual visit.    Physical Exam   GENERAL: Healthy, alert and no distress  EYES: Eyes grossly normal to inspection.  No discharge or erythema, or obvious scleral/conjunctival abnormalities.  RESP: No audible wheeze, cough, or visible cyanosis.  No visible retractions or increased work of breathing.    SKIN: Visible skin clear. No significant rash, abnormal pigmentation or lesions.  NEURO: Cranial nerves grossly intact.  Mentation and speech appropriate for age.  PSYCH: Mentation appears normal, affect normal/bright, judgement and insight intact, normal speech and appearance well-groomed.                Video-Visit Details    Type of service:  Video Visit    Video End Time:3:34  PM    Originating Location (pt. Location): Home    Distant Location (provider location):  [facility] [place]     Platform used for Video Visit: [employer]  Answers for HPI/ROS submitted by the patient on 11/16/2021  If you checked off any problems, how difficult have these problems made it for you to do your work, take care of things at home, or get along with other people?: Not difficult at all  PHQ9 TOTAL SCORE: 8  JOSE MANUEL 7 TOTAL SCORE: 3

## 2021-11-17 ASSESSMENT — PATIENT HEALTH QUESTIONNAIRE - PHQ9: SUM OF ALL RESPONSES TO PHQ QUESTIONS 1-9: 8

## 2021-11-17 ASSESSMENT — ANXIETY QUESTIONNAIRES: GAD7 TOTAL SCORE: 3

## 2021-11-19 DIAGNOSIS — R73.9 HYPERGLYCEMIA: ICD-10-CM

## 2021-11-19 DIAGNOSIS — E11.40 TYPE 2 DIABETES MELLITUS WITH DIABETIC NEUROPATHY, WITHOUT LONG-TERM CURRENT USE OF INSULIN (H): ICD-10-CM

## 2021-11-19 NOTE — TELEPHONE ENCOUNTER
Has established with Dr. Meraz.      Routing refill request to provider for review/approval because:  Patient needs to be seen because:  Failing visit    Daisy Bradley RN

## 2021-11-20 ENCOUNTER — HEALTH MAINTENANCE LETTER (OUTPATIENT)
Age: 61
End: 2021-11-20

## 2021-11-21 RX ORDER — FLURBIPROFEN SODIUM 0.3 MG/ML
SOLUTION/ DROPS OPHTHALMIC
Qty: 400 EACH | Refills: 0 | Status: SHIPPED | OUTPATIENT
Start: 2021-11-21 | End: 2023-01-01

## 2021-12-11 DIAGNOSIS — E11.40 TYPE 2 DIABETES MELLITUS WITH DIABETIC NEUROPATHY, WITHOUT LONG-TERM CURRENT USE OF INSULIN (H): ICD-10-CM

## 2021-12-11 DIAGNOSIS — R73.9 HYPERGLYCEMIA: ICD-10-CM

## 2021-12-13 NOTE — TELEPHONE ENCOUNTER
Routing refill request to provider for review/approval because:  Katie given x1 and patient did not follow up, please advise  Labs out of range:  A1c    Gilda Chavez RN on 12/13/2021 at 11:52 AM

## 2022-02-27 DIAGNOSIS — E11.40 TYPE 2 DIABETES MELLITUS WITH DIABETIC NEUROPATHY, WITHOUT LONG-TERM CURRENT USE OF INSULIN (H): ICD-10-CM

## 2022-02-27 DIAGNOSIS — R73.9 HYPERGLYCEMIA: ICD-10-CM

## 2022-02-28 RX ORDER — GLIPIZIDE 10 MG/1
TABLET, FILM COATED, EXTENDED RELEASE ORAL
Qty: 60 TABLET | Refills: 0 | Status: SHIPPED | OUTPATIENT
Start: 2022-02-28 | End: 2023-01-01

## 2022-03-01 NOTE — TELEPHONE ENCOUNTER
Routing refill request to provider for review/approval because:  Katie given x1 and patient did not follow up, please advise  Labs out of range:  A1c  Patient needs to be seen because:  due for 6 month follow up    Gilda Chavez RN on 3/1/2022 at 11:51 AM

## 2022-03-02 DIAGNOSIS — E11.40 TYPE 2 DIABETES MELLITUS WITH DIABETIC NEUROPATHY, WITHOUT LONG-TERM CURRENT USE OF INSULIN (H): ICD-10-CM

## 2022-03-02 DIAGNOSIS — R73.9 HYPERGLYCEMIA: ICD-10-CM

## 2022-03-03 NOTE — TELEPHONE ENCOUNTER
Should have medication still. Issued 3/1/2022 for 180 tabs.     Cara Mejia, RN, BSN, PHN  [facility]

## 2022-03-12 ENCOUNTER — HEALTH MAINTENANCE LETTER (OUTPATIENT)
Age: 62
End: 2022-03-12

## 2022-03-15 ENCOUNTER — FCC EXTENDED DOCUMENTATION (OUTPATIENT)
Dept: PSYCHOLOGY | Facility: CLINIC | Age: 62
End: 2022-03-15
Payer: MEDICARE

## 2022-03-15 NOTE — PROGRESS NOTES
"                    Discharge Summary  Multiple Sessions    Client Name: Steve Morgan MRN#: [id number] YOB: 1960      Intake / Discharge Date: 10/14/20-3/15/22      DSM5 Diagnoses: (Sustained by DSM5 Criteria Listed Above)  Diagnoses: 296.32 (F33.1) Major Depressive Disorder, Recurrent Episode, Moderate With melancholic features  Psychosocial & Contextual Factors: Individual Factors Patient reports that he has family friend's but otherwise is socially isolated, Community Factors Patient reports that he was volunteering, but has been unable to due to finanical reasons and Health- Seeking Factors Patient reports that neuropathy from diabetes has made it so that he cannot work and creates other difficulties for him.    WHODAS 2.0 (12 item) Score: 20          Presenting Concern:   \" I have a lot of issues, that I want to talk to someone about.  I have issues with my temper, I can go to 0 to terrible in a very short period of time.  I have issues when I drive that I am not happy with. \"      Reason for Discharge:  Client did not return      Disposition at Time of Last Encounter:   Comments:   Improving reports improvements in relationship with wife     Risk Management:   Client denies a history of suicidal ideation, suicide attempts, self-injurious behavior, homicidal ideation, homicidal behavior and and other safety concerns  Recommended that patient call 911 or go to the local ED should there be a change in any of these risk factors.      Referred To:  Patient encouraged to return should he need additional support.        Veronica Chavis, [facility]   3/15/2022  "

## 2022-03-18 ENCOUNTER — NURSE TRIAGE (OUTPATIENT)
Dept: NURSING | Facility: CLINIC | Age: 62
End: 2022-03-18
Payer: MEDICARE

## 2022-03-18 NOTE — TELEPHONE ENCOUNTER
Appointment on Monday for A1C test. It said on the note, if you have a cough, you should schedule a Covid-19 screening. Coughing but doesn't think it's covid-19. Has dry throat issues that makes him cough. On going for two weeks. Not any different than his normal dry cough which he's been followed about by YOLANDA Murdock, PALaC.  He said he's going to take a home test and if that's negative, he will go for the lab draw on Monday.  Otherwise, if anything worsens or changes, he will call back.  Nurse Triage SBAR    Is this a 2nd Level Triage? NO    Situation:   coughing for a couple of weeks. Told he should be tested for coronavirus before coming in for Monday's lab draw.    Background:   labs to be drawn Monday. History of dry cough, no different than before and being followed by his PA for this.  He will do a home test for coronavirus.    Assessment:   His dry cough comes and goes. He's going to do a home test to see if it's covid-19, as recommended, before labs are done on Monday.    Protocol Recommended Disposition:   See Within 3 Days In Office     Recommendation:   He said he will do a home test for coronavirus rather than go in for one and see the urgent care MD within 3 days.     He said he's going to do a home test.    Does the patient meet one of the following criteria for ADS visit consideration? No  Fatuma Munoz RN  [facility]    Reason for Disposition    Allergy symptoms are also present (e.g., itchy eyes, clear nasal discharge, postnasal drip)    Additional Information    Negative: Bluish (or gray) lips or face    Negative: Severe difficulty breathing (e.g., struggling for each breath, speaks in single words)    Negative: Rapid onset of cough and has hives    Negative: Coughing started suddenly after medicine, an allergic food or bee sting    Negative: Difficulty breathing after exposure to flames, smoke, or fumes    Negative: Sounds like a life-threatening emergency to the triager     Negative: Previous asthma attacks and this feels like asthma attack    Negative: Chest pain present when not coughing    Negative: Difficulty breathing    Negative: Passed out (i.e., fainted, collapsed and was not responding)    Negative: Patient sounds very sick or weak to the triager    Negative: Coughed up > 1 tablespoon (15 ml) blood (Exception: blood-tinged sputum)    Negative: Fever > 103 F (39.4 C)    Negative: Fever > 101 F (38.3 C) and over 60 years of age    Negative: Fever > 100.0 F (37.8 C) and has diabetes mellitus or a weak immune system (e.g., HIV positive, cancer chemotherapy, organ transplant, splenectomy, chronic steroids)    Negative: Fever > 100.0 F (37.8 C) and bedridden (e.g., nursing home patient, stroke, chronic illness, recovering from surgery)    Negative: Wheezing is present    Negative: Increasing ankle swelling    Negative: SEVERE coughing spells (e.g., whooping sound after coughing, vomiting after coughing)    Negative: Coughing up luis f-colored (reddish-brown) or blood-tinged sputum    Negative: Fever present > 3 days (72 hours)    Negative: Fever returns after gone for over 24 hours and symptoms worse or not improved    Negative: Using nasal washes and pain medicine > 24 hours and sinus pain persists    Negative: Known COPD or other severe lung disease (i.e., bronchiectasis, cystic fibrosis, lung surgery) and worsening symptoms (i.e., increased sputum purulence or amount, increased breathing difficulty)    Negative: Continuous (nonstop) coughing interferes with work or school and no improvement using cough treatment per Care Advice    Negative: Patient wants to be seen    Negative: Cough has been present for > 3 weeks    Protocols used: COUGH-A-OH

## 2022-03-21 ENCOUNTER — LAB (OUTPATIENT)
Dept: LAB | Facility: CLINIC | Age: 62
End: 2022-03-21
Payer: MEDICARE

## 2022-03-21 DIAGNOSIS — E11.40 TYPE 2 DIABETES MELLITUS WITH DIABETIC NEUROPATHY, WITHOUT LONG-TERM CURRENT USE OF INSULIN (H): ICD-10-CM

## 2022-03-21 LAB — HBA1C MFR BLD: 8.6 % (ref 0–5.6)

## 2022-03-21 PROCEDURE — 83036 HEMOGLOBIN GLYCOSYLATED A1C: CPT

## 2022-03-21 PROCEDURE — 36415 COLL VENOUS BLD VENIPUNCTURE: CPT

## 2022-03-21 NOTE — PROGRESS NOTES
Outcome for 03/21/22 4:18 PM: [employer] message sent  Taylor Myhre, VF  Outcome for 03/23/22 2:11 PM: Patient is not checking blood sugarsnot using juan, also not checking with a manual meter.   KAYLA Almeida  is being evaluated via a billable video visit.      How would you like to obtain your AVS? [employer]  For the video visit, send the invitation by: Send to e-mail at: [email or web address]  Will anyone else be joining your video visit? No

## 2022-03-24 NOTE — RESULT ENCOUNTER NOTE
Labs results/imaging studies results noted. Will discuss in next clinic visit 3/28/22.  Lab Results       Component                Value               Date                       A1C                      8.6                 03/21/2022                 A1C                      9.7                 07/30/2021              Here is a copy for your records.  Follow-up in endocrinology Clinic as scheduled/discussed. We will review these studies/results in further detail at that visit, but feel free to contact us with any other questions in the interim.    Please call endocrinology clinic (nurse line: [phone]) if questions.    Cindi Meraz MD

## 2022-03-28 ENCOUNTER — TELEPHONE (OUTPATIENT)
Dept: ENDOCRINOLOGY | Facility: CLINIC | Age: 62
End: 2022-03-28

## 2022-03-28 ENCOUNTER — VIRTUAL VISIT (OUTPATIENT)
Dept: ENDOCRINOLOGY | Facility: CLINIC | Age: 62
End: 2022-03-28
Payer: MEDICARE

## 2022-03-28 DIAGNOSIS — Z79.4 TYPE 2 DIABETES MELLITUS WITH DIABETIC NEUROPATHY, WITH LONG-TERM CURRENT USE OF INSULIN (H): Primary | ICD-10-CM

## 2022-03-28 DIAGNOSIS — E78.5 DYSLIPIDEMIA: ICD-10-CM

## 2022-03-28 DIAGNOSIS — E03.4 HYPOTHYROIDISM DUE TO ACQUIRED ATROPHY OF THYROID: ICD-10-CM

## 2022-03-28 DIAGNOSIS — Z79.4 LONG TERM (CURRENT) USE OF INSULIN (H): ICD-10-CM

## 2022-03-28 DIAGNOSIS — I10 ESSENTIAL HYPERTENSION WITH GOAL BLOOD PRESSURE LESS THAN 140/90: ICD-10-CM

## 2022-03-28 DIAGNOSIS — E11.40 TYPE 2 DIABETES MELLITUS WITH DIABETIC NEUROPATHY, WITH LONG-TERM CURRENT USE OF INSULIN (H): Primary | ICD-10-CM

## 2022-03-28 DIAGNOSIS — E11.42 DIABETIC POLYNEUROPATHY ASSOCIATED WITH TYPE 2 DIABETES MELLITUS (H): ICD-10-CM

## 2022-03-28 PROCEDURE — 99214 OFFICE O/P EST MOD 30 MIN: CPT | Mod: 95 | Performed by: INTERNAL MEDICINE

## 2022-03-28 ASSESSMENT — ENCOUNTER SYMPTOMS
INCREASED ENERGY: 0
ARTHRALGIAS: 0
TINGLING: 0
NECK PAIN: 0
NUMBNESS: 1
MUSCLE CRAMPS: 0
WEAKNESS: 1
MUSCLE WEAKNESS: 0
JOINT SWELLING: 0
TASTE DISTURBANCE: 0
ALTERED TEMPERATURE REGULATION: 0
LOSS OF CONSCIOUSNESS: 0
HEADACHES: 0
SINUS PAIN: 0
HALLUCINATIONS: 0
WEIGHT GAIN: 1
POLYPHAGIA: 0
SPEECH CHANGE: 0
CHILLS: 0
PARALYSIS: 0
NECK MASS: 0
FEVER: 0
HOARSE VOICE: 0
SINUS CONGESTION: 0
MYALGIAS: 0
MEMORY LOSS: 1
DECREASED APPETITE: 0
STIFFNESS: 0
TROUBLE SWALLOWING: 0
SMELL DISTURBANCE: 0
WEIGHT LOSS: 0
NIGHT SWEATS: 0
SORE THROAT: 0
DISTURBANCES IN COORDINATION: 1
DIZZINESS: 1
BACK PAIN: 0
FATIGUE: 1
POLYDIPSIA: 1
TREMORS: 1
SEIZURES: 0

## 2022-03-28 NOTE — PATIENT INSTRUCTIONS
[facility]  Dr Meraz, Endocrinology Department    [facility]   [address] [facility]  [place] [address]  Appointment [phone]  Fax: [phone]  [place]: Monday - Thursday      To provide the best diabetic care, please bring your blood glucose meter to each and every visit with your Endocrinologist.  Your blood glucose meter/insulin pump will be downloaded at every appointment.    Please arrive 15 minutes before your scheduled appointment.  This will allow for your blood glucose meter/insulin pump to be downloaded.  If you are wearing DEXCOM please bring  or sharing code so that it can be downloaded.  If you are using freestyle juan personal sensors please bring the reader.  If you are using TANDEM insulin pump please have your username and password to get info from [employer] website.    Continue current regimen- Metformin 1000 mg BID, Glipizide XL 20 mg/day and Basaglar 30 units at night.  Continue to hold off Novolog (short acting insulin)  Be consistent with medications. Set up reminders on phone or use visual reminders in kitchen.  Diabetic Educator follow up in 6 weeks for Blood glucose review and titration.  Continue levothyroxine at current dose.  Start using juan as soon as possible- scan often.  Labs in 3-4 months  Please make a lab appointment for blood work and follow up clinic appointment in 1 week after that to discuss results.    Your provider has referred you to Diabetes Education: For all [facility]:  Phone [phone]; Fax [phone]  Please call and make the appointment.        Recommend checking blood sugars before meals and at bedtime.  (as much as possible)  If Blood glucose are low more often-> 2-3 times/week- give us a call.  Make better food choices: reduce carbs, Reduce portion size, weight loss and exercise 3-4 times a week.    What is hypoglycemia:  Hypoglycemia is when blood sugar levels become too low -  below 70 m/dl.      What causes hypoglycemia?  - using too much insulin  -taking too many diabetes pills  -not eating enough, or skipping meals or snacks  -not eating enough carbohydrate with meals  -changing your exercise routine  -drinking alcohol in excess    It is also possible to have hypoglycemia even when you are carefully managing your blood sugar levels.    What does it feel like when blood sugars get too low?  You may feel:  - anxious  -confused  -dizzy  -hungry  -light-headed  -nervous  -shaky  -sleepy  -sweaty    You may have  -blurred or cloudy vision  -heart palpitations (heart skips a beat or races)  -tingling or numbness around the mouth and tongue  -tremors    What to do if you have symptoms of hypoglycmemia:  If you think your blood sugar is too low, check it with a glucose meter.  If its below 70 mg/dl, consume one of the following:  Fruit juice (1/2 cup)  Glucose tablets (15 grams)  Hard candy (5 to 7 pieces)  Honey or sugar (2 teaspoons)  Milk (1/2 cup)  Soft drink (non-diet, 1/2 cup)    Wait 15 minutes and check your blood glucose again.  IF it is still below 70 mg/dl, have another food item listed above. Wait another 15 minutes and repeat the blood glucose test.  Have a small meal or snack that contains some carbohydrate after your blood glucose rises above 70 mg/dl.    If you are at risk of hypoglycemia, always carry with you glucose tablets or one of the foods listed above.      To prevent Hypoglycemia:  Avoid situations that may cause hypoglycemia  Before making any change to your diet or exercise routine, discuss them with your healthcare provider  Keep a record of your blood glucose levels.  Include the time of day, diabetes medications, when you had your last meal or snack, and what you were doing at the time (e.g. Watching TV, gardening, jogging, etc).    Talk to your healthcare provider if your blood glucose levels are often low        Patient guide on  hypoglycemia    [email or web address]

## 2022-03-28 NOTE — LETTER
"    3/28/2022         RE: Steve Morgan  [id number]  [facility] [id number]        Dear Colleague,    Thank you for referring your patient, Steve Morgan, to the [facility]. Please see a copy of my visit note below.    Outcome for 03/21/22 4:18 PM: Lingohub message sent  Taylor Myhre, VF  Outcome for 03/23/22 2:11 PM: Patient is not checking blood sugarsnot using juan, also not checking with a manual meter.   KAYLA Almeida    Steve Morgan  is being evaluated via a billable video visit.      How would you like to obtain your AVS? Zova  For the video visit, send the invitation by: Send to e-mail at: [email or web address]  Will anyone else be joining your video visit? No            THIS IS A VIDEO VISIT:    Phone call visit/virtual visit encounter:  [place] from Diana gonzalez.    Name of patient: Steve Morgan    Date of encounter: 3/28/2022    Time of start of video visit: 11:00    Video started: 11;14    Video ended: 11;25    Provider location: working from home/ [facility]    Patient location: patients home.    Mode of transmission: video/ Doximity    Verbal consent: obtained before starting visit. Pt is agreeable.      The patient has been notified of following:      \"This VIDEO visit will be conducted via a call between you and your physician/provider. We have found that certain health care needs can be provided without the need for a physical exam.  This service lets us provide the care you need with a short phone conversation.  If a prescription is necessary we can send it directly to your pharmacy.  If lab work is needed we can place an order for that and you can then stop by our lab to have the test done at a later time.     With new updates with corona virus patient might be billed as clinic visit.     If during the course of the call the physician/provider feels a telephone visit is not appropriate, you will not be charged for this service.\"      Past " "medical history, social history, family history, allergy and medications were reviewed and updated as appropriate.  Reviewed pertinent labs, notes, imaging studies personally.      Name: Steve \"Caio\" Cahty  F/u for Diabetes.  HPI:  For f/u of DM.    has a past medical history of Cellulitis and abscess of trunk (6/27/2017), Depression, Hyperlipidemia LDL goal <100 (10/31/2010), Hypertension goal BP (blood pressure) < 140/90 (3/17/2011), Hypothyroidism (1/12/2010), Morbid obesity due to excess calories (H) (1/12/2010), Neuropathy in diabetes (H) (1/12/2010), Obesity (1/12/2010), Sleep apnea (1/12/2010), and Type 2 diabetes, HbA1C goal < 8% (H) (3/8/2011).    He reports that he was forgetting to take oral medications and NOVOLOG.  He is no longer taking Novolog- he got in habit of NOT taking it and eventually stopped taking it.  He reports that he only takes oral medication only 2-3 days a week.  Stopped taking it X 4-6 months.  Not Using nina and not checking Blood glucose.  He has challenges with blood sugar testing due to limited use of his hands (neuropathy + tremor - both significant) and increasing difficulty with ADL's   Can't test his blood sugars using a meter due to the above.   He also has an extremely hard time inserting the Nina sensors due to neuropathy and tremor.  Was started on Novolog in past but not currently taking it.  He has stopping drinking pop since Sep 2021.  Weight is stable.  Exercise is limited.  Diet- trying to eat healthy  Ws seen by CDE in 9/2021.    1. Type 2 DM:  Orginally diagnosed at the age of 33 or 38.  Was prediabetic prior, was not particularly symptomatic at the time, was noted on routine lab work    Current Regimen:   Metformin 1000 mg BID  Glipizide XL 20 mg/day  Basaglar 30 units at night  Does not take Novolog (he was supposed to takeNovolog 1:9 + 1:50>150).      The plan was to start Trulicity but the copay was not affordable ($100 per refill).    yes:     Diabetes " Medication(s)     Biguanides       metFORMIN (GLUCOPHAGE) 1000 MG tablet    TAKE 1 TABLET BY MOUTH TWICE A DAY WITH MEALS    Insulin       insulin glargine (BASAGLAR KWIKPEN) 100 UNIT/ML pen    INJECT 30 UNITS UNDER THE SKIN ONCE DAILY. INCREASE AS DIRECTED TO MAX DOSE OF 45 UNITS     NOVOLOG FLEXPEN 100 UNIT/ML soln    TAKE 5 UNITS BEFORE EACH MEAL. INCREASE AS DIRECTED UP TO 45 UNITS PER DAY.    Sulfonylureas       glipiZIDE (GLUCOTROL XL) 10 MG 24 hr tablet    TAKE 2 TABLETS BY MOUTH EVERY DAY        BS checks:   Nina Download: Not checking.  Unable to use glucose meter - he has a hard time doing fingerstick testing due to his tremor.    Complications:   Diabetes Complications  Description / Detail    Diabetic Retinopathy  No retinopathy,last exam 2/2020, [facility]   CAD / PAD  No   Neuropathy  Yes + diabetic neuropathy: numbness hands and feet.  Saw podiatry, Dr. Mathis, 1/27/2018- using diabetic shoes   Nephropathy / Microalbuminuria  Yes, elevated urine microalbumin   Gastroparesis  No   Hypoglycemia [place]  Not sure, gets 'kind of dizzy' when blood sugar is low but reports history of low blood sugars without symptoms     2. Hypertension: Blood Pressure:   BP Readings from Last 3 Encounters:   09/24/21 (!) 148/82   08/30/21 (!) 150/82   08/27/21 (!) 144/74   Blood pressure medications include atenolol 25 mg qd and losartan 50 mg every day.   3. Hyperlipidemia: Takes simvastatin 20 mg for lipid control.       4.  Hypothyroidism. Currently treated with levoxyl (KAMERON) 137 mcg daily. Takes the levoxyl first thing in the morning and waits 30+ minutes before eating breakfast. PA for Levoxyl approved through 4/7/2021.  PMH/PSH:  Past Medical History:   Diagnosis Date     Cellulitis and abscess of trunk 6/27/2017     Depression      Hyperlipidemia LDL goal <100 10/31/2010     Hypertension goal BP (blood pressure) < 140/90 3/17/2011     Hypothyroidism 1/12/2010     Morbid obesity due to excess calories (H) 1/12/2010      Neuropathy in diabetes (H) 1/12/2010     Obesity 1/12/2010     Sleep apnea 1/12/2010    CPAP     Type 2 diabetes, HbA1C goal < 8% (H) 3/8/2011     Past Surgical History:   Procedure Laterality Date     BIOPSY       COLONOSCOPY       EYE SURGERY       GENITOURINARY SURGERY      surg for undescended testicle     REPAIR HAMMER TOE BILATERAL  5/16/2013    Procedure: REPAIR HAMMER TOE BILATERAL;  Flexor Tenotomy Toes 2,3,4,5 Bilateral Feet;  Surgeon: Saad Bangura DPM;  Location: [facility] OR     Family Hx:  Family History   Problem Relation Age of Onset     Breast Cancer Mother      Hypertension Mother      Thyroid Disease Mother      Depression Mother      Alzheimer Disease Mother 82     Cancer - colorectal Father      Thyroid Disease Father      Depression Father      Other - See Comments Father         bladder polyps     Heart Disease Maternal Grandmother         CHF     Circulatory Paternal Grandmother      Cancer Paternal Grandfather      Diabetes Brother      Diabetes Brother      Asthma Brother      Thyroid disease: Yes: mother         DM2: Yes: one brother with type 1 diabetes and one brother with type 2 diabetes, paternal aunt with DM2         Autoimmune: DM1, SLE, RA, Vitiligo Yes: brother with DM1    Social Hx:  Social History     Socioeconomic History     Marital status:      Spouse name: Sri     Number of children: 2     Years of education: Not on file     Highest education level: Associate degree: occupational, technical, or vocational program   Occupational History     Occupation:      Employer: NONE      Comment: [employer]   Tobacco Use     Smoking status: Never Smoker     Smokeless tobacco: Never Used   Vaping Use     Vaping Use: Never used   Substance and Sexual Activity     Alcohol use: Yes     Comment: Occassionally     Drug use: No     Sexual activity: Not Currently     Partners: Female     Birth control/protection: Abstinence   Other Topics Concern     Parent/sibling w/  CABG, MI or angioplasty before 65F 55M? No   Social History Narrative     Not on file     Social Determinants of Health     Financial Resource Strain: Not on file   Food Insecurity: Not on file   Transportation Needs: Not on file   Physical Activity: Not on file   Stress: Not on file   Social Connections: Not on file   Intimate Partner Violence: Not on file   Housing Stability: Not on file          MEDICATIONS:  has a current medication list which includes the following prescription(s): ammonium lactate, ammonium lactate, aspirin, atenolol, b-d u/f, bupropion, calcium carb-cholecalciferol, citalopram, freestyle juan 14 day reader, freestyle juan 14 day sensor, cyanocobalamin, famotidine, ferrous sulfate, finasteride, fluticasone, glipizide, insulin glargine, levoxyl, losartan, metformin, multi-vitamin, novolog flexpen, ondansetron, pantoprazole, simvastatin, cholecalciferol, hydrocortisone, mupirocin, mupirocin, and nystatin.    ROS     ROS: 10 point ROS neg other than the symptoms noted above in the HPI.    PE:  There were no vitals taken for this visit.  GENERAL: healthy, alert and no distress  EYES: Eyes grossly normal to inspection, conjunctivae and sclerae normal  ENT: no nose swelling, nasal discharge.  Thyroid: no apparent thyroid nodules  RESP: no audible wheeze, cough, or visible cyanosis.  No visible retractions or increased work of breathing.  Able to speak fully in complete sentences.  ABDO: not evaluated.  EXTREMITIES: no hand tremors.  NEURO: Cranial nerves grossly intact, mentation intact and speech normal  SKIN: No apparent skin lesions, rash or edema seen   PSYCH: mentation appears normal, affect normal/bright, judgement and insight intact, normal speech and appearance well-groomed    LABS:    Component      Latest Ref Rng & Units 8/29/2019   Glucose 316   C-Peptide      0.9 - 6.9 ng/mL 2.5     A1c:  Lab Results   Component Value Date    A1C 8.6 03/21/2022    A1C 9.7 07/30/2021    A1C 7.4  02/22/2021    A1C 9.9 03/12/2020    A1C 11.7 12/05/2019    A1C 11.6 08/29/2019    A1C 11.8 12/14/2018       Basic Metabolic Panel:  Creatinine   Date Value Ref Range Status   09/24/2021 1.38 (H) 0.66 - 1.25 mg/dL Final   01/16/2020 1.01 0.66 - 1.25 mg/dL Final     Urine microabumin:  Lab Results   Component Value Date    UMALCR 120.00 07/30/2021    UMALCR 81.46 03/05/2020       LFTS/Cholesterol Panel:  Recent Labs   Lab Test 07/30/21  0708 03/05/20  0857 05/21/15  1104 04/19/14  0802   CHOL 162 139 137 122   HDL 31* 33* 30* 23*   LDL 98 88 78 71   TRIG 163* 89 144 141   CHOLHDLRATIO  --   --  4.6 5.3*       Thyroid Function:  ENDO THYROID LABS-[facility] Latest Ref Rng & Units 7/30/2021   TSH 0.40 - 4.00 mU/L 1.85   FREE T3 2.3 - 4.2 pg/mL    T4 0.76 - 1.46 ng/dL 1.19     Component    Latest Ref Rng & Units 10/19/2018   Thyroid Peroxidase Antibody    <35 IU/mL 11   Thyroglobulin Antibody    <40 IU/mL <20   Thyroid Stim Immunog    <=1.3 TSI index <1.0     All pertinent notes, labs, and images personally reviewed by me.     A/P  Mr.Walter Morgan is a 61 year old evaluated via phone visit for the management of:    1. DM2 - Under good control. A1c 8.6%.  Diabetes is complicated by neuropathy and nephropathy/elevated urine microalbumin.  He has challenges with blood sugar testing due to limited use of his hands (neuropathy + tremor - both significant) and increasing difficulty with ADL's   Can't test his blood sugars using a meter due to the above.   He also has an extremely hard time inserting the Nina sensors due to neuropathy and tremor.  Is not complainant with regimen. Forgetting oral medications.  Not using Novolog currently- last visit 3-4 months back.  Using nina but limited data available.  Plan:  Discussed diagnosis, pathophysiology, management and treatment options of condition with pt.  I also discussed importance of strict blood sugar control to prevent complications associated with uncontrolled  diabetes.    Continue current regimen- Metformin 1000 mg BID, Glipizide XL 20 mg/day and Basaglar 30 units at night.  Continue to hold off Novolog (short acting insulin)  Be consistent with medications. Set up reminders on phone or use visual reminders in kitchen.  Diabetic Educator follow up in 6 weeks for Blood glucose review and titration.  Start using juan as soon as possible- scan often.  Labs in 3-4 months.  Please make a lab appointment for blood work and follow up clinic appointment in 1 week after that to discuss results.    (nnoted mild CKD on 9/2021 labs- will recheck in 3-4 months adjust metformin dose accordingly)      2. Hypothyroidism.  Currently treated with Levoxyl (KAMERON) 137 mcg/day.  Clinically and biochemically euthyroid.   Labs in range.  Plan:  Discussed diagnosis, pathophysiology, management and treatment options of condition with pt.  Continue Levoxyl 137 mcg/day.   Labs in 3-4 months or sooner if concerns.    Discussed s/s of hypothyroidism and hyperthyroidism to watch for.  The patient indicates understanding of these issues and agrees with the plan.    3. Hyperlipidemia - On statin therapy. Managed by PCP. Not discussed.    4. Prevention:  Opthalmology-Yes: recommend annually  Dental-Yes:recommend twice a year  ASA-Yes, 81 mg qd   Smoking- No    Most Recent Immunizations   Administered Date(s) Administered     COVID-19,PF,Vi 03/25/2021     COVID-19,PF,Pfizer (12+ Yrs) 11/06/2021     DTaP, Unspecified 11/08/2010     Flu, Unspecified 11/15/2016     [id number] Novel Flu 11/04/2009     HepA-Adult 04/17/2019     Influenza (H1N1) 11/04/2009     Influenza (IIV3) PF 10/01/2012     Influenza Quad, Recombinant, pf(RIV4) (Flublok) 09/14/2021     Influenza Vaccine IM > 6 months Valent IIV4 (Alfuria,Fluzone) 09/07/2017     Influenza Vaccine, 6+MO IM (QUADRIVALENT W/PRESERVATIVES) 11/15/2016     MMR 03/21/1995     Pneumococcal 23 valent 10/05/2012     TD (ADULT, 7+) 03/21/1995     TDAP Vaccine  (Adacel) 11/08/2010     Tdap (Adacel,Boostrix) 09/24/2021     Typhoid IM 10/16/2018     Zoster vaccine recombinant adjuvanted (SHINGRIX) 12/03/2020     Follow-up:  3-4 months.    Cindi Meraz MD  [facility]  3/28/2022  CC: Lisa Pierre          Again, thank you for allowing me to participate in the care of your patient.        Sincerely,        Cindi Meraz MD

## 2022-03-28 NOTE — PROGRESS NOTES
"THIS IS A VIDEO VISIT:    Phone call visit/virtual visit encounter:  [place] from Diana gonzalez.    Name of patient: Steve Morgan    Date of encounter: 3/28/2022    Time of start of video visit: 11:00    Video started: 11;14    Video ended: 11;25    Provider location: working from [place]/ [facility]    Patient location: patients home.    Mode of transmission: video/ Doximity    Verbal consent: obtained before starting visit. Pt is agreeable.      The patient has been notified of following:      \"This VIDEO visit will be conducted via a call between you and your physician/provider. We have found that certain health care needs can be provided without the need for a physical exam.  This service lets us provide the care you need with a short phone conversation.  If a prescription is necessary we can send it directly to your pharmacy.  If lab work is needed we can place an order for that and you can then stop by our lab to have the test done at a later time.     With new updates with corona virus patient might be billed as clinic visit.     If during the course of the call the physician/provider feels a telephone visit is not appropriate, you will not be charged for this service.\"      Past medical history, social history, family history, allergy and medications were reviewed and updated as appropriate.  Reviewed pertinent labs, notes, imaging studies personally.      Name: Steve \"Caio\" Cathy  F/u for Diabetes.  HPI:  For f/u of DM.    has a past medical history of Cellulitis and abscess of trunk (6/27/2017), Depression, Hyperlipidemia LDL goal <100 (10/31/2010), Hypertension goal BP (blood pressure) < 140/90 (3/17/2011), Hypothyroidism (1/12/2010), Morbid obesity due to excess calories (H) (1/12/2010), Neuropathy in diabetes (H) (1/12/2010), Obesity (1/12/2010), Sleep apnea (1/12/2010), and Type 2 diabetes, HbA1C goal < 8% (H) (3/8/2011).    He reports that he was forgetting to take oral medications and " NOVOLOG.  He is no longer taking Novolog- he got in habit of NOT taking it and eventually stopped taking it.  He reports that he only takes oral medication only 2-3 days a week.  Stopped taking it X 4-6 months.  Not Using nina and not checking Blood glucose.  He has challenges with blood sugar testing due to limited use of his hands (neuropathy + tremor - both significant) and increasing difficulty with ADL's   Can't test his blood sugars using a meter due to the above.   He also has an extremely hard time inserting the Nina sensors due to neuropathy and tremor.  Was started on Novolog in past but not currently taking it.  He has stopping drinking pop since Sep 2021.  Weight is stable.  Exercise is limited.  Diet- trying to eat healthy  Ws seen by CDE in 9/2021.    1. Type 2 DM:  Orginally diagnosed at the age of 33 or 38.  Was prediabetic prior, was not particularly symptomatic at the time, was noted on routine lab work    Current Regimen:   Metformin 1000 mg BID  Glipizide XL 20 mg/day  Basaglar 30 units at night  Does not take Novolog (he was supposed to takeNovolog 1:9 + 1:50>150).      The plan was to start Trulicity but the copay was not affordable ($100 per refill).    yes:     Diabetes Medication(s)     Biguanides       metFORMIN (GLUCOPHAGE) 1000 MG tablet    TAKE 1 TABLET BY MOUTH TWICE A DAY WITH MEALS    Insulin       insulin glargine (BASAGLAR KWIKPEN) 100 UNIT/ML pen    INJECT 30 UNITS UNDER THE SKIN ONCE DAILY. INCREASE AS DIRECTED TO MAX DOSE OF 45 UNITS     NOVOLOG FLEXPEN 100 UNIT/ML soln    TAKE 5 UNITS BEFORE EACH MEAL. INCREASE AS DIRECTED UP TO 45 UNITS PER DAY.    Sulfonylureas       glipiZIDE (GLUCOTROL XL) 10 MG 24 hr tablet    TAKE 2 TABLETS BY MOUTH EVERY DAY        BS checks:   Nina Download: Not checking.  Unable to use glucose meter - he has a hard time doing fingerstick testing due to his tremor.    Complications:   Diabetes Complications  Description / Detail    Diabetic  Retinopathy  No retinopathy,last exam 2/2020, [facility]   CAD / PAD  No   Neuropathy  Yes + diabetic neuropathy: numbness hands and feet.  Saw podiatry, Dr. Mathis, 1/27/2018- using diabetic shoes   Nephropathy / Microalbuminuria  Yes, elevated urine microalbumin   Gastroparesis  No   Hypoglycemia [place]  Not sure, gets 'kind of dizzy' when blood sugar is low but reports history of low blood sugars without symptoms     2. Hypertension: Blood Pressure:   BP Readings from Last 3 Encounters:   09/24/21 (!) 148/82   08/30/21 (!) 150/82   08/27/21 (!) 144/74   Blood pressure medications include atenolol 25 mg qd and losartan 50 mg every day.   3. Hyperlipidemia: Takes simvastatin 20 mg for lipid control.       4.  Hypothyroidism. Currently treated with levoxyl (KAMERON) 137 mcg daily. Takes the levoxyl first thing in the morning and waits 30+ minutes before eating breakfast. PA for Levoxyl approved through 4/7/2021.  PMH/PSH:  Past Medical History:   Diagnosis Date     Cellulitis and abscess of trunk 6/27/2017     Depression      Hyperlipidemia LDL goal <100 10/31/2010     Hypertension goal BP (blood pressure) < 140/90 3/17/2011     Hypothyroidism 1/12/2010     Morbid obesity due to excess calories (H) 1/12/2010     Neuropathy in diabetes (H) 1/12/2010     Obesity 1/12/2010     Sleep apnea 1/12/2010    CPAP     Type 2 diabetes, HbA1C goal < 8% (H) 3/8/2011     Past Surgical History:   Procedure Laterality Date     BIOPSY       COLONOSCOPY       EYE SURGERY       GENITOURINARY SURGERY      surg for undescended testicle     REPAIR HAMMER TOE BILATERAL  5/16/2013    Procedure: REPAIR HAMMER TOE BILATERAL;  Flexor Tenotomy Toes 2,3,4,5 Bilateral Feet;  Surgeon: Saad Bangura DPM;  Location: [facility] OR     Family Hx:  Family History   Problem Relation Age of Onset     Breast Cancer Mother      Hypertension Mother      Thyroid Disease Mother      Depression Mother      Alzheimer Disease Mother 82     Cancer - colorectal Father       Thyroid Disease Father      Depression Father      Other - See Comments Father         bladder polyps     Heart Disease Maternal Grandmother         CHF     Circulatory Paternal Grandmother      Cancer Paternal Grandfather      Diabetes Brother      Diabetes Brother      Asthma Brother      Thyroid disease: Yes: mother         DM2: Yes: one brother with type 1 diabetes and one brother with type 2 diabetes, paternal aunt with DM2         Autoimmune: DM1, SLE, RA, Vitiligo Yes: brother with DM1    Social Hx:  Social History     Socioeconomic History     Marital status:      Spouse name: Sri     Number of children: 2     Years of education: Not on file     Highest education level: Associate degree: occupational, technical, or vocational program   Occupational History     Occupation:      Employer: NONE      Comment: [employer]   Tobacco Use     Smoking status: Never Smoker     Smokeless tobacco: Never Used   Vaping Use     Vaping Use: Never used   Substance and Sexual Activity     Alcohol use: Yes     Comment: Occassionally     Drug use: No     Sexual activity: Not Currently     Partners: Female     Birth control/protection: Abstinence   Other Topics Concern     Parent/sibling w/ CABG, MI or angioplasty before 65F 55M? No   Social History Narrative     Not on file     Social Determinants of Health     Financial Resource Strain: Not on file   Food Insecurity: Not on file   Transportation Needs: Not on file   Physical Activity: Not on file   Stress: Not on file   Social Connections: Not on file   Intimate Partner Violence: Not on file   Housing Stability: Not on file          MEDICATIONS:  has a current medication list which includes the following prescription(s): ammonium lactate, ammonium lactate, aspirin, atenolol, b-d u/f, bupropion, calcium carb-cholecalciferol, citalopram, freestyle juan 14 day reader, freestyle juan 14 day sensor, cyanocobalamin, famotidine, ferrous sulfate, finasteride,  fluticasone, glipizide, insulin glargine, levoxyl, losartan, metformin, multi-vitamin, novolog flexpen, ondansetron, pantoprazole, simvastatin, cholecalciferol, hydrocortisone, mupirocin, mupirocin, and nystatin.    ROS     ROS: 10 point ROS neg other than the symptoms noted above in the HPI.    PE:  There were no vitals taken for this visit.  GENERAL: healthy, alert and no distress  EYES: Eyes grossly normal to inspection, conjunctivae and sclerae normal  ENT: no nose swelling, nasal discharge.  Thyroid: no apparent thyroid nodules  RESP: no audible wheeze, cough, or visible cyanosis.  No visible retractions or increased work of breathing.  Able to speak fully in complete sentences.  ABDO: not evaluated.  EXTREMITIES: no hand tremors.  NEURO: Cranial nerves grossly intact, mentation intact and speech normal  SKIN: No apparent skin lesions, rash or edema seen   PSYCH: mentation appears normal, affect normal/bright, judgement and insight intact, normal speech and appearance well-groomed    LABS:    Component      Latest Ref Rng & Units 8/29/2019   Glucose 316   C-Peptide      0.9 - 6.9 ng/mL 2.5     A1c:  Lab Results   Component Value Date    A1C 8.6 03/21/2022    A1C 9.7 07/30/2021    A1C 7.4 02/22/2021    A1C 9.9 03/12/2020    A1C 11.7 12/05/2019    A1C 11.6 08/29/2019    A1C 11.8 12/14/2018       Basic Metabolic Panel:  Creatinine   Date Value Ref Range Status   09/24/2021 1.38 (H) 0.66 - 1.25 mg/dL Final   01/16/2020 1.01 0.66 - 1.25 mg/dL Final     Urine microabumin:  Lab Results   Component Value Date    UMALCR 120.00 07/30/2021    UMALCR 81.46 03/05/2020       LFTS/Cholesterol Panel:  Recent Labs   Lab Test 07/30/21  0708 03/05/20  0857 05/21/15  1104 04/19/14  0802   CHOL 162 139 137 122   HDL 31* 33* 30* 23*   LDL 98 88 78 71   TRIG 163* 89 144 141   CHOLHDLRATIO  --   --  4.6 5.3*       Thyroid Function:  ENDO THYROID LABS-[facility] Latest Ref Rng & Units 7/30/2021   TSH 0.40 - 4.00 mU/L 1.85   FREE T3 2.3 - 4.2  pg/mL    T4 0.76 - 1.46 ng/dL 1.19     Component    Latest Ref Rng & Units 10/19/2018   Thyroid Peroxidase Antibody    <35 IU/mL 11   Thyroglobulin Antibody    <40 IU/mL <20   Thyroid Stim Immunog    <=1.3 TSI index <1.0     All pertinent notes, labs, and images personally reviewed by me.     A/P  Mr.Walter Morgan is a 61 year old evaluated via phone visit for the management of:    1. DM2 - Under good control. A1c 8.6%.  Diabetes is complicated by neuropathy and nephropathy/elevated urine microalbumin.  He has challenges with blood sugar testing due to limited use of his hands (neuropathy + tremor - both significant) and increasing difficulty with ADL's   Can't test his blood sugars using a meter due to the above.   He also has an extremely hard time inserting the Nina sensors due to neuropathy and tremor.  Is not complainant with regimen. Forgetting oral medications.  Not using Novolog currently- last visit 3-4 months back.  Using nina but limited data available.  Plan:  Discussed diagnosis, pathophysiology, management and treatment options of condition with pt.  I also discussed importance of strict blood sugar control to prevent complications associated with uncontrolled diabetes.    Continue current regimen- Metformin 1000 mg BID, Glipizide XL 20 mg/day and Basaglar 30 units at night.  Continue to hold off Novolog (short acting insulin)  Be consistent with medications. Set up reminders on phone or use visual reminders in kitchen.  Diabetic Educator follow up in 6 weeks for Blood glucose review and titration.  Start using nina as soon as possible- scan often.  Labs in 3-4 months.  Please make a lab appointment for blood work and follow up clinic appointment in 1 week after that to discuss results.    (nnoted mild CKD on 9/2021 labs- will recheck in 3-4 months adjust metformin dose accordingly)      2. Hypothyroidism.  Currently treated with Levoxyl (KAMERON) 137 mcg/day.  Clinically and biochemically euthyroid.    Labs in range.  Plan:  Discussed diagnosis, pathophysiology, management and treatment options of condition with pt.  Continue Levoxyl 137 mcg/day.   Labs in 3-4 months or sooner if concerns.    Discussed s/s of hypothyroidism and hyperthyroidism to watch for.  The patient indicates understanding of these issues and agrees with the plan.    3. Hyperlipidemia - On statin therapy. Managed by PCP. Not discussed.    4. Prevention:  Opthalmology-Yes: recommend annually  Dental-Yes:recommend twice a year  ASA-Yes, 81 mg qd   Smoking- No    Most Recent Immunizations   Administered Date(s) Administered     COVID-19,PF,Vi 03/25/2021     COVID-19,PF,Pfizer (12+ Yrs) 11/06/2021     DTaP, Unspecified 11/08/2010     Flu, Unspecified 11/15/2016     X3h9-23 Novel Flu 11/04/2009     HepA-Adult 04/17/2019     Influenza (H1N1) 11/04/2009     Influenza (IIV3) PF 10/01/2012     Influenza Quad, Recombinant, pf(RIV4) (Flublok) 09/14/2021     Influenza Vaccine IM > 6 months Valent IIV4 (Alfuria,Fluzone) 09/07/2017     Influenza Vaccine, 6+MO IM (QUADRIVALENT W/PRESERVATIVES) 11/15/2016     MMR 03/21/1995     Pneumococcal 23 valent 10/05/2012     TD (ADULT, 7+) 03/21/1995     TDAP Vaccine (Adacel) 11/08/2010     Tdap (Adacel,Boostrix) 09/24/2021     Typhoid IM 10/16/2018     Zoster vaccine recombinant adjuvanted (SHINGRIX) 12/03/2020     Follow-up:  3-4 months.    Cindi Meraz MD  Endocrinology   [facility]/[place]  3/28/2022  CC: Lisa Pierre

## 2022-03-28 NOTE — TELEPHONE ENCOUNTER
Spoke with pt about scheduling labs 3-4 months per BILL cuellar, in addition to in clinic appointment 1 week after. Details confirmed.

## 2022-04-12 ENCOUNTER — IMMUNIZATION (OUTPATIENT)
Dept: NURSING | Facility: CLINIC | Age: 62
End: 2022-04-12
Payer: MEDICARE

## 2022-04-12 PROCEDURE — 91306 COVID-19,PF,MODERNA (18+ YRS BOOSTER .25ML): CPT

## 2022-04-12 PROCEDURE — 0064A COVID-19,PF,MODERNA (18+ YRS BOOSTER .25ML): CPT

## 2022-04-12 NOTE — MR AVS SNAPSHOT
After Visit Summary   4/4/2018    Steve Morgan    MRN: [id number]           Patient Information     Date Of Birth          1960        Visit Information        Provider Department      4/4/2018 9:30 AM [facility] [place]        Today's Diagnoses     Type 2 diabetes mellitus with diabetic neuropathy, with long-term current use of insulin (H)    -  1      Care Instructions    My Diabetes Care Goals:    1. Continue to work on portion control. Check on additional resources (classes, American Diabetes Association, Weight Watchers, etc).    2. Take your medication every day as directed. Include a small breakfast to help remind you to take all of your medicine. This may also help you feel less hungry later on in the day.    3. Call [employer] (maker of the Nina) to report defective sensor. If they do not replace free of change- let me know.     4. Can consider Victoza or other GLP-1 at a future visit, if Diana Butler feels this might be appropriate for you. For now as requested- let's concentrate on staying consistent with your current regimen and not missing any medication doses.     Follow up:  Follow-up diabetes education appointment scheduled on 4/18/18 at 10:30 am. Bring your Nina reader.     Call ([phone]), e-mail ([email or web address]), or send [employer] message with questions, concerns or if follow-up is needed.    Lucía Chávez RD, LD, CDE  Diabetes      Bring blood glucose meter and logbook with you to all doctor and follow-up appointments.     [facility] for the [facility]:  For Your Diabetes Education and Nutrition Appointments Call:  [phone]   For Diabetes Education or Nutrition Related Questions:   Phone: [phone]  E-mail: [email or web address]  Fax: [phone]   If you need a medication refill please contact your pharmacy. Please allow 3 business days for your refills  Goal Outcome Evaluation:              Outcome Evaluation: Pt is alert, confused to place and situation at times, he is on room air.  He is wearing a primofit and SCD's.  Accuchecks TID, consistent carb diet.  Pt is to have MRI, screening sheet faxed.  No complaints of pain, VSS, wife is at bedside, will continue to monitor.   to be completed.    Instructions for emailing the Diabetes Educators    If you need to communicate a non-urgent message to a Diabetes Educator via email, please send to [email or web address].    Please follow the following email guidelines:    Subject line: Secure: your clinic name (example: Secure: Mike)  In the email please include: First name, middle initial, last name and date of birth.    We will be in touch with you within one (1) business day.             Follow-ups after your visit        Your next 10 appointments already scheduled     Apr 18, 2018 10:30 AM CDT   Diabetic Education with Lucía Chávez   [facility] ([facility])    [address] [place] [address] [phone]            May 08, 2018 11:30 AM CDT   Return Visit with BRIDGET [facility] ([facility])    [address]  [facility] [address] [phone]              Who to contact     If you have questions or need follow up information about today's clinic visit or your schedule please contact [facility] directly at [phone].  Normal or non-critical lab and imaging results will be communicated to you by MyChart, letter or phone within 4 business days after the clinic has received the results. If you do not hear from us within 7 days, please contact the clinic through MyChart or phone. If you have a critical or abnormal lab result, we will notify you by phone as soon as possible.  Submit refill requests through [employer] or call your pharmacy and they will forward the refill request to us. Please allow 3 business days for your refill to be completed.          Additional Information About Your Visit        [employer] Information     [employer] gives you secure access to your electronic health record. If you see a primary care provider, you can also send messages to your care team and  make appointments. If you have questions, please call your primary care clinic.  If you do not have a primary care provider, please call [phone] and they will assist you.        Care EveryWhere ID     This is your Care EveryWhere ID. This could be used by other organizations to access your [place] medical records  [id number]         Blood Pressure from Last 3 Encounters:   02/06/18 136/78   01/31/18 132/80   01/08/18 138/84    Weight from Last 3 Encounters:   03/13/18 110 kg (242 lb 8.1 oz)   02/06/18 109.7 kg (241 lb 12.8 oz)   01/31/18 110.2 kg (243 lb)              We Performed the Following     DIABETES EDUCATION - Individual  []        Primary Care Provider Office Phone # Fax #    Lisa Pierre PA-C [phone]  [phone]        Equal Access to Services     [facility] SUZY : Hadii aad ku hadasho Solisandra, waaxda luqadaha, qaybta kaalmada adeegyada, waxblaise gimenez . So [facility] [phone].    ATENCIÓN: Si habla español, tiene a adler disposición servicios gratuitos de asistencia lingüística. Llame al [phone].    We comply with applicable federal civil rights laws and [place] laws. We do not discriminate on the basis of race, color, national origin, age, disability, sex, sexual orientation, or gender identity.            Thank you!     Thank you for choosing [facility]  for your care. Our goal is always to provide you with excellent care. Hearing back from our patients is one way we can continue to improve our services. Please take a few minutes to complete the written survey that you may receive in the mail after your visit with us. Thank you!             Your Updated Medication List - Protect others around you: Learn how to safely use, store and throw away your medicines at www.disposemymeds.org.          This list is accurate as of 4/4/18 11:50 AM.  Always use your most recent med list.                    Brand Name Dispense Instructions for use Diagnosis    ammonium lactate 12 % cream    LAC-HYDRIN    385 g    Apply topically 2 times daily as needed for dry skin    Diabetic polyneuropathy associated with type 2 diabetes mellitus (H), Pre-ulcerative calluses, Pes planus of both feet       aspirin 81 MG tablet     100    ONE DAILY        atenolol 25 MG tablet    TENORMIN    90 tablet    Take 1 tablet (25 mg) by mouth every morning    Essential hypertension with goal blood pressure less than 140/90       * blood glucose monitoring lancets     1 Box    Use to test blood sugars 2 times daily or as directed.    Type 2 diabetes, HbA1C goal < 8% (H)       * blood glucose monitoring lancets     100 each    Use to test blood sugar 3 times daily or as directed.    Type 2 diabetes mellitus with diabetic neuropathy, unspecified long term insulin use status (H)       * blood glucose monitoring test strip    ONETOUCH VERIO IQ    100 strip    Use to test blood sugars 2 times daily or as directed.    Type 2 diabetes, HbA1C goal < 8% (H)       * blood glucose monitoring test strip    KERLINE CONTOUR NEXT    100 strip    Use to test blood sugar 3 times daily or as directed.    Type 2 diabetes mellitus with diabetic neuropathy, unspecified long term insulin use status (H)       buPROPion 300 MG 24 hr tablet    WELLBUTRIN XL    90 tablet    Take 1 tablet (300 mg) by mouth every morning    Excessive anger       CALCIUM 600 + D PO      Take 1 tablet by mouth daily        citalopram 20 MG tablet    celeXA    90 tablet    Take 1 tablet (20 mg) by mouth daily    Excessive anger       continuous blood glucose monitoring sensor     3 each    For use with Freestyle Nina Flash  for continuous monitioring of blood glucose levels. Replace sensor every 10 days.    Type 2 diabetes mellitus with diabetic neuropathy, with long-term current use of insulin (H)       ferrous sulfate 325 (65 FE) MG tablet    IRON     Take 1 tablet by  mouth daily (with breakfast).        finasteride 5 MG tablet    PROSCAR    90 tablet    Take 1 tablet (5 mg) by mouth daily    Benign prostatic hyperplasia with urinary hesitancy       FREESTYLE GIULIANA READER Georgette     1 Device    1 kit as needed    Type 2 diabetes mellitus with diabetic neuropathy, with long-term current use of insulin (H)       glipiZIDE 10 MG 24 hr tablet    GLUCOTROL XL    180 tablet    Take 2 tablets (20 mg) by mouth every morning    Type 2 diabetes mellitus with diabetic neuropathy, without long-term current use of insulin (H)       hydrocortisone 0.2 % cream    WESTCORT    45 g    Apply topically 2 times daily as needed Apply sparingly to affected area, BID.    Dermatitis       insulin pen needle 31G X 5 MM    B-D U/F    100 each    Use 1 daily or as directed.    Type 2 diabetes mellitus with diabetic neuropathy, unspecified long term insulin use status (H)       * LANTUS SOLOSTAR 100 UNIT/ML injection   Generic drug:  insulin glargine     15 mL    Inject 18 units SQ daily    Type 2 diabetes mellitus with diabetic neuropathy, unspecified long term insulin use status (H)       * insulin glargine 100 UNIT/ML injection    LANTUS SOLOSTAR    45 mL    22 units qd.  Increase as directed to max dose of 45 units qd.    Type 2 diabetes mellitus with diabetic neuropathy, with long-term current use of insulin (H)       LEVOXYL 175 MCG tablet   Generic drug:  levothyroxine     90 tablet    Take 1 tablet (175 mcg) by mouth daily    Hypothyroidism due to acquired atrophy of thyroid       losartan 50 MG tablet    COZAAR    90 tablet    TAKE 1 TABLET DAILY    Essential hypertension with goal blood pressure less than 140/90       metFORMIN 1000 MG tablet    GLUCOPHAGE    180 tablet    Take 1 tablet (1,000 mg) by mouth 2 times daily (with meals)    Type 2 diabetes mellitus with diabetic neuropathy, without long-term current use of insulin (H)       Multi-vitamin Tabs tablet   Generic drug:  multivitamin,  therapeutic with minerals     30    1 TABLET DAILY        omeprazole-sodium bicarbonate  MG per capsule    ZEGERID     Take 1 capsule by mouth every morning (before breakfast)        order for DME     1 Units    Equipment being ordered: single cane with rubber foot    Type 2 diabetes, HbA1C goal < 8% (H), Neuropathy in diabetes (H), Unsteady gait       order for DME     1 Units    Equipment being ordered: four pronged cane    Unsteady gait, Neuropathy in diabetes (H), Type 2 diabetes, HbA1C goal < 8% (H)       simvastatin 20 MG tablet    ZOCOR    90 tablet    Take 1 tablet (20 mg) by mouth At Bedtime    Hyperlipidemia LDL goal <100       UNABLE TO FIND      MEDICATION NAME: Prednisolone Acetate, Gatifloxacin and Bromfenac 1/0.5/0.075% - instill one drop in the surgery eye three times daily beginning 2 days before surgery        VITAMIN B 12 PO      Take 250 mcg by mouth daily    Excessive anger       VITAMIN D (CHOLECALCIFEROL) PO      Take 1,000 Units by mouth daily.        * Notice:  This list has 6 medication(s) that are the same as other medications prescribed for you. Read the directions carefully, and ask your doctor or other care provider to review them with you.

## 2022-04-15 DIAGNOSIS — F33.1 MODERATE EPISODE OF RECURRENT MAJOR DEPRESSIVE DISORDER (H): ICD-10-CM

## 2022-04-15 DIAGNOSIS — K21.00 GASTROESOPHAGEAL REFLUX DISEASE WITH ESOPHAGITIS WITHOUT HEMORRHAGE: ICD-10-CM

## 2022-04-15 RX ORDER — ONDANSETRON 4 MG/1
TABLET, FILM COATED ORAL
Qty: 30 TABLET | Refills: 3 | Status: ON HOLD | OUTPATIENT
Start: 2022-04-15 | End: 2022-12-23

## 2022-04-15 RX ORDER — FAMOTIDINE 40 MG/1
TABLET, FILM COATED ORAL
Qty: 90 TABLET | Refills: 1 | Status: SHIPPED | OUTPATIENT
Start: 2022-04-15 | End: 2022-10-10

## 2022-04-15 RX ORDER — CITALOPRAM HYDROBROMIDE 40 MG/1
TABLET ORAL
Qty: 90 TABLET | Refills: 1 | Status: SHIPPED | OUTPATIENT
Start: 2022-04-15 | End: 2022-12-05

## 2022-04-15 NOTE — TELEPHONE ENCOUNTER
Routing refill request to provider for review/approval because:  Abnormal PHQ9    PHQ-9 score:    PHQ 11/16/2021   PHQ-9 Total Score 8   Q9: Thoughts of better off dead/self-harm past 2 weeks Several days   F/U: Thoughts of suicide or self-harm No   F/U: Safety concerns No     Gilda Chavez RN on 4/15/2022 at 12:01 PM

## 2022-04-19 ENCOUNTER — MYC MEDICAL ADVICE (OUTPATIENT)
Dept: FAMILY MEDICINE | Facility: CLINIC | Age: 62
End: 2022-04-19
Payer: MEDICARE

## 2022-04-19 ENCOUNTER — TELEPHONE (OUTPATIENT)
Dept: FAMILY MEDICINE | Facility: CLINIC | Age: 62
End: 2022-04-19

## 2022-04-19 ASSESSMENT — PATIENT HEALTH QUESTIONNAIRE - PHQ9
SUM OF ALL RESPONSES TO PHQ QUESTIONS 1-9: 5
SUM OF ALL RESPONSES TO PHQ QUESTIONS 1-9: 5
10. IF YOU CHECKED OFF ANY PROBLEMS, HOW DIFFICULT HAVE THESE PROBLEMS MADE IT FOR YOU TO DO YOUR WORK, TAKE CARE OF THINGS AT HOME, OR GET ALONG WITH OTHER PEOPLE: SOMEWHAT DIFFICULT

## 2022-04-19 NOTE — TELEPHONE ENCOUNTER
MA -   Please see my chart message, response to quality outreach     Thank you,   Marcela Blackburn, Registered Nurse, PAL (Patient Advocate Liason)   [facility]   [phone]

## 2022-04-20 ASSESSMENT — PATIENT HEALTH QUESTIONNAIRE - PHQ9: SUM OF ALL RESPONSES TO PHQ QUESTIONS 1-9: 5

## 2022-05-06 ENCOUNTER — VIRTUAL VISIT (OUTPATIENT)
Dept: EDUCATION SERVICES | Facility: CLINIC | Age: 62
End: 2022-05-06
Attending: INTERNAL MEDICINE
Payer: MEDICARE

## 2022-05-06 DIAGNOSIS — Z79.4 TYPE 2 DIABETES MELLITUS WITH DIABETIC NEUROPATHY, WITH LONG-TERM CURRENT USE OF INSULIN (H): ICD-10-CM

## 2022-05-06 DIAGNOSIS — E11.40 TYPE 2 DIABETES MELLITUS WITH DIABETIC NEUROPATHY, WITH LONG-TERM CURRENT USE OF INSULIN (H): ICD-10-CM

## 2022-05-06 PROCEDURE — G0108 DIAB MANAGE TRN  PER INDIV: HCPCS | Performed by: DIETITIAN, REGISTERED

## 2022-05-06 NOTE — LETTER
5/6/2022         RE: Steve Morgan  [id number]  [place] [facility] [address]        Dear Colleague,    Thank you for referring your patient, Steve Morgan, to the [facility]. Please see a copy of my visit note below.    No notes on file

## 2022-05-07 ENCOUNTER — HEALTH MAINTENANCE LETTER (OUTPATIENT)
Age: 62
End: 2022-05-07

## 2022-05-13 NOTE — PROGRESS NOTES
"Diabetes Self-Management Education & Support    Presents for: Individual review    SUBJECTIVE/OBJECTIVE:  Presents for: Individual review  Accompanied by: Self  Diabetes education in the past 24mo: Yes  Focus of Visit: Healthy Eating  Diabetes type: Type 2  Disease course: Stable  How confident are you filling out medical forms by yourself:: Not Assessed  Transportation concerns: No  Other concerns:: Physical impairment  Cultural Influences/Ethnic Background:  Not  or     Diabetes Symptoms & Complications:  Fatigue: Sometimes  Neuropathy: No  Polydipsia: Yes  Polyphagia: Yes  Polyuria: No  Visual change: No  Slow healing wounds: Yes  Complications assessed today?: No  Autonomic neuropathy: No  CVA: No  Heart disease: No  Nephropathy: No  Peripheral neuropathy: Yes  Peripheral Vascular Disease: No  Retinopathy: No  Sexual dysfunction: Yes    Patient Problem List and Family Medical History reviewed for relevant medical history, current medical status, and diabetes risk factors.    Vitals:  There were no vitals taken for this visit.  Estimated body mass index is 40.77 kg/m  as calculated from the following:    Height as of 11/16/21: 1.651 m (5' 5\").    Weight as of 11/16/21: 111.1 kg (245 lb).   Last 3 BP:   BP Readings from Last 3 Encounters:   09/24/21 (!) 148/82   08/30/21 (!) 150/82   08/27/21 (!) 144/74       History   Smoking Status     Never Smoker   Smokeless Tobacco     Never Used       Labs:  Lab Results   Component Value Date    A1C 8.6 03/21/2022    A1C 7.4 02/22/2021     Lab Results   Component Value Date     09/24/2021     01/16/2020     Lab Results   Component Value Date    LDL 98 07/30/2021    LDL 88 03/05/2020     HDL Cholesterol   Date Value Ref Range Status   03/05/2020 33 (L) >39 mg/dL Final     Direct Measure HDL   Date Value Ref Range Status   07/30/2021 31 (L) >=40 mg/dL Final     Comment:     0-19 years:       Greater than or equal to 45 mg/dL   Low: Less than 40 " mg/dL   Borderline low: 40-44 mg/dL     20 years and older:   Female: Greater than or equal to 50 mg/dL   Male:   Greater than or equal to 40 mg/dL        ]  GFR Estimate   Date Value Ref Range Status   09/24/2021 54 (L) >60 mL/min/1.73m2 Final     Comment:     As of July 11, 2021, eGFR is calculated by the CKD-EPI creatinine equation, without race adjustment. eGFR can be influenced by muscle mass, exercise, and diet. The reported eGFR is an estimation only and is only applicable if the renal function is stable.   01/16/2020 79 >60 mL/min/[1.73_m2] Final     Comment:     Non  GFR Calc  Starting 12/18/2018, serum creatinine based estimated GFR (eGFR) will be   calculated using the Chronic Kidney Disease Epidemiology Collaboration   (CKD-EPI) equation.       GFR Estimate If Black   Date Value Ref Range Status   01/16/2020 >90 >60 mL/min/[1.73_m2] Final     Comment:      GFR Calc  Starting 12/18/2018, serum creatinine based estimated GFR (eGFR) will be   calculated using the Chronic Kidney Disease Epidemiology Collaboration   (CKD-EPI) equation.       Lab Results   Component Value Date    CR 1.38 09/24/2021    CR 1.01 01/16/2020     No results found for: MICROALBUMIN    Healthy Eating:  Healthy Eating Assessed Today: Yes  Cultural/Sikhism diet restrictions?: No  Meal planning/habits: Avoiding sweets, Calorie counting, Carb counting, Smaller portions, Keeps food records  How many times a week on average do you eat food made away from home (restaurant/take-out)?: 2  Meals include: Lunch, Dinner, Afternoon Snack, Evening Snack  Breakfast: does not like eggs, cheese, now avoids cereal, does like waffles or peanut butter toast  Beverages: Water, Diet soda  Has patient met with a dietitian in the past?: Yes    Being Active:  Being Active Assessed Today: Yes  Exercise:: Currently not exercising  Barrier to exercise: Other, Safety    Monitoring:  Monitoring Assessed Today: Yes  Did patient  bring glucose meter to appointment? : No  Blood Glucose Meter: CGM  Times checking blood sugar at home (number): Never    Taking Medications:  Diabetes Medication(s)     Biguanides       metFORMIN (GLUCOPHAGE) 1000 MG tablet    TAKE 1 TABLET BY MOUTH TWICE A DAY WITH MEALS    Insulin       insulin glargine (BASAGLAR KWIKPEN) 100 UNIT/ML pen    INJECT 30 UNITS UNDER THE SKIN ONCE DAILY. INCREASE AS DIRECTED TO MAX DOSE OF 45 UNITS     NOVOLOG FLEXPEN 100 UNIT/ML soln    TAKE 5 UNITS BEFORE EACH MEAL. INCREASE AS DIRECTED UP TO 45 UNITS PER DAY.- not taking    Sulfonylureas       glipiZIDE (GLUCOTROL XL) 10 MG 24 hr tablet    TAKE 2 TABLETS BY MOUTH EVERY DAY          Taking Medication Assessed Today: Yes  Current Treatments: Diet, Insulin Injections, Oral Medication (taken by mouth)    Problem Solving:  Problem Solving Assessed Today: Yes  Is the patient at risk for hypoglycemia?: Yes  Hypoglycemia Frequency: Never    Reducing Risks:  Reducing Risks Assessed Today: No  CAD Risks: Diabetes Mellitus, Dyslipidemia, Family history, Hypertension, Obesity, Sedentary lifestyle  Has dilated eye exam at least once a year?: Yes  Sees dentist every 6 months?: Yes  Feet checked by healthcare provider in the last year?: Yes    Healthy Coping:  Healthy Coping Assessed Today: Yes  Emotional response to diabetes: Concern for health and well-being  Informal Support system:: Children, Family, Spouse  Stage of change: PREPARATION (Decided to change - considering how)  Support resources: Weight Management  Patient Activation Measure Survey Score:  SHANNON Score (Last Two) 3/30/2017 1/15/2020   SHANNON Raw Score 29 36   Activation Score 52.9 75.5   SHANNON Level 2 4       Diabetes knowledge and skills assessment:   Patient is knowledgeable in diabetes management concepts related to: Healthy Eating, Being Active, Monitoring, Taking Medication, Problem Solving, Reducing Risks and Healthy Coping    Patient needs further education on the following  diabetes management concepts: Healthy Eating and Monitoring    Based on learning assessment above, most appropriate setting for further diabetes education would be: Individual setting.      INTERVENTIONS:    Education provided today on:  AADE Self-Care Behaviors:  Healthy Eating: consistency in amount, composition, and timing of food intake and portion control  Monitoring: purpose    Opportunities for ongoing education and support in diabetes-self management were discussed.    Pt verbalized understanding of concepts discussed and recommendations provided today.       Education Materials Provided:  No new materials provided today      ASSESSMENT:  Patient requesting reinforcement of nutrition guidelines. Not currently monitoring blood glucose. Advised to resume use of CGM prior to upcoming vacation. Will also re-focus his attention on portion control, healthy meal planning, and resume food logging. Will review glucose data and food logs at 1 month follow up.     Patient's most recent   Lab Results   Component Value Date    A1C 8.6 03/21/2022    A1C 7.4 02/22/2021    is not meeting goal of <7.0    PLAN  See Patient Instructions for co-developed, patient-stated behavior change goals.  AVS printed and provided to patient today. See Follow-Up section for recommended follow-up.    Lucía Chávez RD, [facility] [place]  Diabetes     Time Spent: 60 minutes  Encounter Type: Individual    Any diabetes medication dose changes were made via the CDE Protocol and Collaborative Practice Agreement with the patient's primary care provider. A copy of this encounter was shared with the provider.

## 2022-05-13 NOTE — PATIENT INSTRUCTIONS
Resume use of Nina CGM asap. We will review at next visit.     Resume keeping a food log upon your return from vacation.     Follow up scheduled 1 month, video visit.    Lucía Chávez RD, [facility]  Diabetes

## 2022-05-17 ENCOUNTER — TRANSFERRED RECORDS (OUTPATIENT)
Dept: LAB | Facility: CLINIC | Age: 62
End: 2022-05-17

## 2022-05-17 LAB — RETINOPATHY: NEGATIVE

## 2022-05-26 DIAGNOSIS — R73.9 HYPERGLYCEMIA: ICD-10-CM

## 2022-05-26 DIAGNOSIS — E78.5 HYPERLIPIDEMIA LDL GOAL <100: ICD-10-CM

## 2022-05-26 DIAGNOSIS — R39.11 BENIGN PROSTATIC HYPERPLASIA WITH URINARY HESITANCY: ICD-10-CM

## 2022-05-26 DIAGNOSIS — N40.1 BENIGN PROSTATIC HYPERPLASIA WITH URINARY HESITANCY: ICD-10-CM

## 2022-05-26 DIAGNOSIS — E11.40 TYPE 2 DIABETES MELLITUS WITH DIABETIC NEUROPATHY, WITHOUT LONG-TERM CURRENT USE OF INSULIN (H): ICD-10-CM

## 2022-05-27 RX ORDER — SIMVASTATIN 20 MG
TABLET ORAL
Qty: 90 TABLET | Refills: 1 | Status: SHIPPED | OUTPATIENT
Start: 2022-05-27 | End: 2022-10-10

## 2022-05-27 RX ORDER — FINASTERIDE 5 MG/1
TABLET, FILM COATED ORAL
Qty: 90 TABLET | Refills: 1 | Status: SHIPPED | OUTPATIENT
Start: 2022-05-27 | End: 2022-10-10

## 2022-06-12 ASSESSMENT — ENCOUNTER SYMPTOMS
NAUSEA: 1
SHORTNESS OF BREATH: 0
JOINT SWELLING: 0
EYE PAIN: 0
HEMATOCHEZIA: 0
WEAKNESS: 0
HEARTBURN: 0
DIARRHEA: 0
ARTHRALGIAS: 0
SORE THROAT: 0
HEADACHES: 1
FREQUENCY: 1
HEMATURIA: 0
CHILLS: 0
COUGH: 0
CONSTIPATION: 0
PARESTHESIAS: 0
PALPITATIONS: 0
FEVER: 0
ABDOMINAL PAIN: 0
DIZZINESS: 1
NERVOUS/ANXIOUS: 1
MYALGIAS: 0
DYSURIA: 0

## 2022-06-12 ASSESSMENT — ACTIVITIES OF DAILY LIVING (ADL): CURRENT_FUNCTION: NO ASSISTANCE NEEDED

## 2022-06-12 ASSESSMENT — PATIENT HEALTH QUESTIONNAIRE - PHQ9
SUM OF ALL RESPONSES TO PHQ QUESTIONS 1-9: 6
10. IF YOU CHECKED OFF ANY PROBLEMS, HOW DIFFICULT HAVE THESE PROBLEMS MADE IT FOR YOU TO DO YOUR WORK, TAKE CARE OF THINGS AT HOME, OR GET ALONG WITH OTHER PEOPLE: SOMEWHAT DIFFICULT
SUM OF ALL RESPONSES TO PHQ QUESTIONS 1-9: 6

## 2022-06-13 SDOH — ECONOMIC STABILITY: FOOD INSECURITY: WITHIN THE PAST 12 MONTHS, YOU WORRIED THAT YOUR FOOD WOULD RUN OUT BEFORE YOU GOT MONEY TO BUY MORE.: NEVER TRUE

## 2022-06-13 SDOH — ECONOMIC STABILITY: INCOME INSECURITY: HOW HARD IS IT FOR YOU TO PAY FOR THE VERY BASICS LIKE FOOD, HOUSING, MEDICAL CARE, AND HEATING?: NOT HARD AT ALL

## 2022-06-13 SDOH — ECONOMIC STABILITY: TRANSPORTATION INSECURITY
IN THE PAST 12 MONTHS, HAS LACK OF TRANSPORTATION KEPT YOU FROM MEETINGS, WORK, OR FROM GETTING THINGS NEEDED FOR DAILY LIVING?: NO

## 2022-06-13 SDOH — HEALTH STABILITY: PHYSICAL HEALTH: ON AVERAGE, HOW MANY MINUTES DO YOU ENGAGE IN EXERCISE AT THIS LEVEL?: 0 MIN

## 2022-06-13 SDOH — ECONOMIC STABILITY: FOOD INSECURITY: WITHIN THE PAST 12 MONTHS, THE FOOD YOU BOUGHT JUST DIDN'T LAST AND YOU DIDN'T HAVE MONEY TO GET MORE.: NEVER TRUE

## 2022-06-13 SDOH — ECONOMIC STABILITY: TRANSPORTATION INSECURITY
IN THE PAST 12 MONTHS, HAS THE LACK OF TRANSPORTATION KEPT YOU FROM MEDICAL APPOINTMENTS OR FROM GETTING MEDICATIONS?: NO

## 2022-06-13 SDOH — ECONOMIC STABILITY: INCOME INSECURITY: IN THE LAST 12 MONTHS, WAS THERE A TIME WHEN YOU WERE NOT ABLE TO PAY THE MORTGAGE OR RENT ON TIME?: NO

## 2022-06-13 SDOH — HEALTH STABILITY: PHYSICAL HEALTH: ON AVERAGE, HOW MANY DAYS PER WEEK DO YOU ENGAGE IN MODERATE TO STRENUOUS EXERCISE (LIKE A BRISK WALK)?: 0 DAYS

## 2022-06-13 ASSESSMENT — LIFESTYLE VARIABLES
HOW OFTEN DO YOU HAVE SIX OR MORE DRINKS ON ONE OCCASION: NEVER
HOW MANY STANDARD DRINKS CONTAINING ALCOHOL DO YOU HAVE ON A TYPICAL DAY: PATIENT DOES NOT DRINK
AUDIT-C TOTAL SCORE: 0
SKIP TO QUESTIONS 9-10: 1
HOW OFTEN DO YOU HAVE A DRINK CONTAINING ALCOHOL: NEVER

## 2022-06-13 ASSESSMENT — SOCIAL DETERMINANTS OF HEALTH (SDOH)
HOW OFTEN DO YOU ATTEND CHURCH OR RELIGIOUS SERVICES?: 1 TO 4 TIMES PER YEAR
DO YOU BELONG TO ANY CLUBS OR ORGANIZATIONS SUCH AS CHURCH GROUPS UNIONS, FRATERNAL OR ATHLETIC GROUPS, OR SCHOOL GROUPS?: YES
HOW OFTEN DO YOU GET TOGETHER WITH FRIENDS OR RELATIVES?: ONCE A WEEK
IN A TYPICAL WEEK, HOW MANY TIMES DO YOU TALK ON THE PHONE WITH FAMILY, FRIENDS, OR NEIGHBORS?: ONCE A WEEK

## 2022-06-15 ENCOUNTER — OFFICE VISIT (OUTPATIENT)
Dept: FAMILY MEDICINE | Facility: CLINIC | Age: 62
End: 2022-06-15
Payer: MEDICARE

## 2022-06-15 VITALS
OXYGEN SATURATION: 98 % | HEART RATE: 68 BPM | HEIGHT: 65 IN | TEMPERATURE: 97.7 F | SYSTOLIC BLOOD PRESSURE: 128 MMHG | WEIGHT: 245 LBS | BODY MASS INDEX: 40.82 KG/M2 | DIASTOLIC BLOOD PRESSURE: 68 MMHG

## 2022-06-15 DIAGNOSIS — L98.491 SKIN ULCER OF FINGER, LIMITED TO BREAKDOWN OF SKIN (H): ICD-10-CM

## 2022-06-15 DIAGNOSIS — Z23 NEED FOR PNEUMOCOCCAL VACCINATION: ICD-10-CM

## 2022-06-15 DIAGNOSIS — R41.3 POOR MEMORY: ICD-10-CM

## 2022-06-15 DIAGNOSIS — I73.9 PERIPHERAL ARTERIAL DISEASE (H): ICD-10-CM

## 2022-06-15 DIAGNOSIS — Z00.00 ENCOUNTER FOR MEDICARE ANNUAL WELLNESS EXAM: Primary | ICD-10-CM

## 2022-06-15 DIAGNOSIS — K21.00 GASTROESOPHAGEAL REFLUX DISEASE WITH ESOPHAGITIS WITHOUT HEMORRHAGE: ICD-10-CM

## 2022-06-15 DIAGNOSIS — E66.01 MORBID OBESITY DUE TO EXCESS CALORIES (H): ICD-10-CM

## 2022-06-15 DIAGNOSIS — K44.9 HIATAL HERNIA: ICD-10-CM

## 2022-06-15 DIAGNOSIS — F33.1 MODERATE EPISODE OF RECURRENT MAJOR DEPRESSIVE DISORDER (H): ICD-10-CM

## 2022-06-15 PROCEDURE — 99214 OFFICE O/P EST MOD 30 MIN: CPT | Mod: 25 | Performed by: PHYSICIAN ASSISTANT

## 2022-06-15 PROCEDURE — G0439 PPPS, SUBSEQ VISIT: HCPCS | Performed by: PHYSICIAN ASSISTANT

## 2022-06-15 PROCEDURE — 90677 PCV20 VACCINE IM: CPT | Performed by: PHYSICIAN ASSISTANT

## 2022-06-15 PROCEDURE — G0009 ADMIN PNEUMOCOCCAL VACCINE: HCPCS | Performed by: PHYSICIAN ASSISTANT

## 2022-06-15 RX ORDER — PANTOPRAZOLE SODIUM 40 MG/1
40 TABLET, DELAYED RELEASE ORAL DAILY
Qty: 90 TABLET | Refills: 1 | Status: ON HOLD | OUTPATIENT
Start: 2022-06-15 | End: 2022-12-23

## 2022-06-15 RX ORDER — MUPIROCIN 20 MG/G
OINTMENT TOPICAL 3 TIMES DAILY
Qty: 22 G | Refills: 1 | Status: SHIPPED | OUTPATIENT
Start: 2022-06-15 | End: 2022-06-22

## 2022-06-15 RX ORDER — CEPHALEXIN 500 MG/1
500 CAPSULE ORAL 3 TIMES DAILY
Qty: 30 CAPSULE | Refills: 0 | Status: SHIPPED | OUTPATIENT
Start: 2022-06-15 | End: 2022-06-25

## 2022-06-15 ASSESSMENT — ACTIVITIES OF DAILY LIVING (ADL): CURRENT_FUNCTION: NO ASSISTANCE NEEDED

## 2022-06-15 ASSESSMENT — ENCOUNTER SYMPTOMS
DYSURIA: 0
PALPITATIONS: 0
CONSTIPATION: 0
WEAKNESS: 0
FEVER: 0
ABDOMINAL PAIN: 0
SORE THROAT: 0
FREQUENCY: 1
DIZZINESS: 1
HEARTBURN: 0
SHORTNESS OF BREATH: 0
JOINT SWELLING: 0
EYE PAIN: 0
MYALGIAS: 0
HEMATOCHEZIA: 0
COUGH: 0
DIARRHEA: 0
NAUSEA: 1
NERVOUS/ANXIOUS: 1
PARESTHESIAS: 0
ARTHRALGIAS: 0
HEMATURIA: 0
HEADACHES: 1
CHILLS: 0

## 2022-06-15 NOTE — PATIENT INSTRUCTIONS
"  Patient Education   Personalized Prevention Plan  You are due for the preventive services outlined below.  Your care team is available to assist you in scheduling these services.  If you have already completed any of these items, please share that information with your care team to update in your medical record.  Health Maintenance Due   Topic Date Due     Pneumococcal Vaccine (2 - PCV) 10/05/2013     Annual Wellness Visit  01/16/2021     Eye Exam  08/04/2021     ANNUAL REVIEW OF HM ORDERS  05/13/2022     A1C Lab  06/21/2022       Depression and Suicide in Older Adults    Nearly 2 million older Americans have some type of depression. Some of them even take their own lives. Yet depression among older adults is often ignored. Learn the warning signs. You may help spare a loved one needless pain. You may also save a life.   What is depression?  Depression is a common and serious illness that affects the way you think and feel. It is not a normal part of aging, nor is it a sign of weakness, a character flaw, or something you can snap out of. Most people with depression need treatment to get better. The most common symptom is a feeling of deep sadness. People who are depressed also may seem tired and listless. And nothing seems to give them pleasure. It s normal to grieve or be sad sometimes. But sadness lessens or passes with time. Depression rarely goes away or improves on its own. A person with clinical depression can't \"snap out of it.\" Other symptoms of depression are:     Sleeping more or less than normal    Eating more or less than normal    Having headaches, stomachaches, or other pains that don t go away    Feeling nervous,  empty,  or worthless    Crying a great deal    Thinking or talking about suicide or death    Loss of interest in activities previously enjoyed    Social isolation    Feeling confused or forgetful  What causes it?  The causes of depression aren t fully known. But it is thought to result " from a complex blend of these factors:     Biochemistry. Certain chemicals in the brain play a role.    Genes. Depression does run in families.    Life stress. Life stresses can also trigger depression in some people. Older adults often face many stressors, such as death of friends or a spouse, health problems, and financial concerns.    Chronic conditions. This includes conditions such as diabetes, heart disease, or cancer. These can cause symptoms of depression. Medicine side effects can cause changes in thoughts and behaviors.  How you can help  Often, depressed people may not want to ask for help. When they do, they may be ignored. Or, they may receive the wrong treatment. You can help by showing parents and older friends love and support. If they seem depressed, don t lecture the person, ignore the symptoms, or discount the symptoms as a  normal  part of aging -which they are not. Get involved, listen, and show interest and support.   Help them understand that depression is a treatable illness. Tell them you can help them find the right treatment. Offer to go to their healthcare provider's appointment with them for support when the symptoms are discussed. With their approval, contact a local mental health center, social service agency, or hospital about services.   You can be an advocate for him or her at healthcare appointments. Many older adults have chronic illnesses that can cause symptoms of depression. Medicine side effects can change thoughts and behaviors. You can help make sure that the healthcare provider looks at all of these factors. He or she should refer your family member or friend to a mental healthcare provider when needed. in some cases, untreated depression can lead to a misdiagnosis. A person may be diagnosed with a brain disorder such as dementia. If the healthcare provider does not take the issue of depression seriously, help your family member or friend to find another provider.   Don't  be afraid to ask  If you think an older person you care about could be suicidal, ask,  Have you thought about suicide?  Most people will tell you the truth. If they say  yes,  they may already have a plan for how and when they will attempt it. Find out as much as you can. The more detailed the plan, and the easier it is to carry out, the more danger the person is in right now. Tell the person you are there for them and do not want them to harm him or herself. Don't wait to get help for the person. Call the person's healthcare provider, local hospital, or emergency services.   To learn more    National Suicide Prevention Lifeline (crisis hotline) 956-593-LUVT (999-138-3280)    National [place] of Mental Pafqit080-109-0656ayk.[facility] Center[facility].nih.gov    National [place] on Mental [place]845-564-7877zft.ethan.org    Mental Health Vhslsfj656-542-6249uuq.[facility].org    National Suicide Vvwjqpc350-YKZHSOP (433-059-8399)    Call 911  Never leave the person alone. A person who is actively suicidal needs psychiatric care right away. They will need constant supervision. Never leave the person out of sight. Call 911 or the national 24-hour suicide crisis hotline at 358-888-RVNN (454-170-6869). You can also take the person to the closest emergency room.   Martita last reviewed this educational content on 5/1/2020 2000-2021 [employer]. All rights reserved. This information is not intended as a substitute for professional medical care. Always follow your healthcare professional's instructions.

## 2022-06-15 NOTE — PROGRESS NOTES
"SUBJECTIVE:   Steve Morgan is a 62 year old male who presents for Preventive Visit.      Patient has been advised of split billing requirements and indicates understanding: No  Are you in the first 12 months of your Medicare coverage?  No    Healthy Habits:     In general, how would you rate your overall health?  Fair    Frequency of exercise:  None    Do you usually eat at least 4 servings of fruit and vegetables a day, include whole grains    & fiber and avoid regularly eating high fat or \"junk\" foods?  No    Taking medications regularly:  No    Barriers to taking medications:  Problems remembering to take them    Medication side effects:  Not applicable and None    Ability to successfully perform activities of daily living:  No assistance needed    Home Safety:  Lack of grab bars in the bathroom    Hearing Impairment:  Need to ask people to speak up or repeat themselves    In the past 6 months, have you been bothered by leaking of urine?  No    In general, how would you rate your overall mental or emotional health?  Good      PHQ-2 Total Score: 4    Additional concerns today:  Yes    1.  Patient has concerns about his fingers for the past several months which he has noted to have worsening ulcers.  He has not noticed anything that makes this better or worse.  He has been to the wound clinic in the past for the same issue.  2.  Patient also has concerns of poor memory states his family has noticed that he cannot remember things at home.  He had previously had similar concerns a few years ago and never followed through with a referral.  He would like a referral today for neuropsych testing.  3.  Patient also having concerns with reflux and vomiting.  He had an endoscopy and colonoscopy several months ago at [facility] which showed a hiatal hernia.  He has been taking 40 mg of pantoprazole every morning with 40 mg of famotidine in the evening without improvement of symptoms.      Do you feel safe " in your environment? Yes           Fall risk  Fallen 2 or more times in the past year?: Yes  Any fall with injury in the past year?: No    Cognitive Screening   1) Repeat 3 items (Leader, Season, Table)    2) Clock draw: NORMAL  3) 3 item recall: Recalls 1 object   Results: NORMAL clock, 1-2 items recalled: COGNITIVE IMPAIRMENT LESS LIKELY    Mini-CogTM Copyright DAINA Finley. Licensed by the author for use in [facility]; reprinted with permission (soob@[facility]). All rights reserved.      Do you have sleep apnea, excessive snoring or daytime drowsiness?: yes    Reviewed and updated as needed this visit by clinical staff   Tobacco  Allergies                 Reviewed and updated as needed this visit by Provider                   Social History     Tobacco Use     Smoking status: Never Smoker     Smokeless tobacco: Never Used   Substance Use Topics     Alcohol use: Yes     Comment: Occassionally     If you drink alcohol do you typically have >3 drinks per day or >7 drinks per week? No    Alcohol Use 6/12/2022   Prescreen: >3 drinks/day or >7 drinks/week? No   Prescreen: >3 drinks/day or >7 drinks/week? -               Current providers sharing in care for this patient include:   Patient Care Team:  Lisa Pierre PA-C as PCP - General (Physician Assistant)  Lucía Chávez RD as Diabetes Educator (Dietitian, Registered)  Lisa Pierre PA-C as Assigned PCP  Eleazar Zafar DPM as Assigned Musculoskeletal Provider  Veronica Chavis LICSW as Therapist ( - Clinical)  Cindi Meraz MD as Assigned Endocrinology Provider  Jasmyn Argueta PA-C as Assigned Sleep Provider    The following health maintenance items are reviewed in Epic and correct as of today:  Health Maintenance Due   Topic Date Due     Pneumococcal Vaccine: Pediatrics (0 to 5 Years) and At-Risk Patients (6 to 64 Years) (2 - PCV) 10/05/2013     EYE EXAM  08/04/2021     ANNUAL REVIEW OF  ORDERS   "05/13/2022     A1C  06/21/2022     BP Readings from Last 3 Encounters:   06/15/22 128/68   09/24/21 (!) 148/82   08/30/21 (!) 150/82    Wt Readings from Last 3 Encounters:   06/15/22 111.1 kg (245 lb)   11/16/21 111.1 kg (245 lb)   09/24/21 109.3 kg (241 lb)                  Pneumonia Vaccine:For adults 65 years or older who do not have an immunocompromising condition, cerebrospinal fluid leak, or cochlear implant and want to receive PPSV23 ONLY: Administer 1 dose of PPSV23. Anyone who received any doses of PPSV23 before age 65 should receive 1 final dose of the vaccine at age 65 or older. Administer this last dose at least 5 years after the prior PPSV23 dose.        Review of Systems   Constitutional: Negative for chills and fever.   HENT: Positive for hearing loss. Negative for congestion, ear pain and sore throat.    Eyes: Negative for pain and visual disturbance.   Respiratory: Negative for cough and shortness of breath.    Cardiovascular: Negative for palpitations and peripheral edema.   Gastrointestinal: Positive for nausea. Negative for abdominal pain, constipation, diarrhea, heartburn and hematochezia.   Genitourinary: Positive for frequency and impotence. Negative for dysuria, genital sores, hematuria, penile discharge and urgency.   Musculoskeletal: Negative for arthralgias, joint swelling and myalgias.   Skin: Negative for rash.   Neurological: Positive for dizziness and headaches. Negative for weakness and paresthesias.   Psychiatric/Behavioral: Negative for mood changes. The patient is nervous/anxious.          OBJECTIVE:   /68   Pulse 68   Temp 97.7  F (36.5  C) (Temporal)   Ht 1.651 m (5' 5\")   Wt 111.1 kg (245 lb)   SpO2 98%   BMI 40.77 kg/m   Estimated body mass index is 40.77 kg/m  as calculated from the following:    Height as of this encounter: 1.651 m (5' 5\").    Weight as of this encounter: 111.1 kg (245 lb).  Physical Exam  GENERAL APPEARANCE: alert and no distress  RESP: lungs " clear to auscultation - no rales, rhonchi or wheezes  CV: regular rates and rhythm, normal S1 S2, no S3 or S4 and no murmur, click or rub  ABDOMEN: soft, nontender, without hepatosplenomegaly or masses and bowel sounds normal  MS: right and left hands with contractures. Open wounds several fingers of both hands  PSYCH: mentation appears normal and affect normal/bright    Diagnostic Test Results:  Labs reviewed in Epic    ASSESSMENT / PLAN:   (Z00.00) Encounter for Medicare annual wellness exam  (primary encounter diagnosis)  Comment:   Plan: REVIEW OF HEALTH MAINTENANCE PROTOCOL ORDERS,         DC ANNUAL WELLNESS VISIT, PPS, SUBSEQUENT            (L98.971) Skin ulcer of finger, limited to breakdown of skin (H)  Comment: follow-up w/ wound clinic.   Plan: cephALEXin (KEFLEX) 500 MG capsule, mupirocin         (BACTROBAN) 2 % external ointment, Wound Care         Referral, OFFICE/OUTPT VISIT,EST,LEVL IV            (F33.1) Moderate episode of recurrent major depressive disorder (H)  Comment:   Plan: stable.     (I73.9) Peripheral arterial disease (H)  Comment: newer skin ulcers on fingers.  No other claudication symptoms.   Plan: OFFICE/OUTPT VISIT,EST,LEVL IV            (E66.01) Morbid obesity due to excess calories (H)  Comment:   Plan: working on diet.     (R41.3) Poor memory  Comment: referral placed again.   Plan: Adult Neuropsychology Referral, OFFICE/OUTPT         VISIT,EST,LEVL IV            (K21.00) Gastroesophageal reflux disease with esophagitis without hemorrhage  Comment: symptoms not improving, discussed importance of follow-up with GI.  Cannot r/o gastroparesis given DM2 and significant neuropathy  Plan: pantoprazole (PROTONIX) 40 MG EC tablet,         OFFICE/OUTPT VISIT,EST,LEVL IV            (K44.9) Hiatal hernia  Comment: noted on endoscopy  Plan: Adult Gastro Ref - Consult Only, OFFICE/OUTPT         VISIT,EST,LEVL IV            (Z23) Need for pneumococcal vaccination  Comment: agreeable.   Plan:  "Pneumococcal 20 Valent Conjugate (Prevnar 20)                  COUNSELING:  Reviewed preventive health counseling, as reflected in patient instructions       Regular exercise       Healthy diet/nutrition       Vision screening       Hearing screening       Dental care       Immunizations    Vaccinated for: Pneumococcal          Estimated body mass index is 40.77 kg/m  as calculated from the following:    Height as of this encounter: 1.651 m (5' 5\").    Weight as of this encounter: 111.1 kg (245 lb).    Weight management plan: Discussed healthy diet and exercise guidelines    He reports that he has never smoked. He has never used smokeless tobacco.      Appropriate preventive services were discussed with this patient, including applicable screening as appropriate for cardiovascular disease, diabetes, osteopenia/osteoporosis, and glaucoma.  As appropriate for age/gender, discussed screening for colorectal cancer, prostate cancer, breast cancer, and cervical cancer. Checklist reviewing preventive services available has been given to the patient.    Reviewed patients plan of care and provided an AVS. The Intermediate Care Plan ( asthma action plan, low back pain action plan, and migraine action plan) for Steve meets the Care Plan requirement. This Care Plan has been established and reviewed with the Patient.    Counseling Resources:  ATP IV Guidelines  Pooled Cohorts Equation Calculator  Breast Cancer Risk Calculator  Breast Cancer: Medication to Reduce Risk  FRAX Risk Assessment  ICSI Preventive Guidelines  Dietary Guidelines for Americans, 2010  [employer]'s MyPlate  ASA Prophylaxis  Lung CA Screening    ANASTASIA [facility]    Identified Health Risks:    The patient's PHQ-9 score is consistent with mild depression. He was provided with information regarding depression and was advised to schedule a follow up appointment in  weeks to further address this issue.  Answers for HPI/ROS " "submitted by the patient on 6/12/2022  If you checked off any problems, how difficult have these problems made it for you to do your work, take care of things at home, or get along with other people?: Somewhat difficult  PHQ9 TOTAL SCORE: 6  In general, how would you rate your overall physical health?: fair  Frequency of exercise:: None  Do you usually eat at least 4 servings of fruit and vegetables a day, include whole grains & fiber, and avoid regularly eating high fat or \"junk\" foods? : No  Taking medications regularly:: No  Medication side effects:: Not applicable, None  Activities of Daily Living: no assistance needed  Home safety: lack of grab bars in the bathroom  Hearing Impairment:: need to ask people to speak up or repeat themselves  In the past 6 months, have you been bothered by leaking of urine?: No  abdominal pain: No  Blood in stool: No  Blood in urine: No  chills: No  congestion: No  constipation: No  cough: No  diarrhea: No  dizziness: Yes  ear pain: No  eye pain: No  nervous/anxious: Yes  fever: No  frequency: Yes  genital sores: No  headaches: Yes  hearing loss: Yes  heartburn: No  arthralgias: No  joint swelling: No  peripheral edema: No  mood changes: No  myalgias: No  nausea: Yes  dysuria: No  palpitations: No  Skin sensation changes: No  sore throat: No  urgency: No  rash: No  shortness of breath: No  visual disturbance: No  weakness: No  impotence: Yes  penile discharge: No  In general, how would you rate your overall mental or emotional health?: good  Additional concerns today:: Yes  Barriers to taking medications:: Problems remembering to take them        "

## 2022-06-16 ENCOUNTER — TELEPHONE (OUTPATIENT)
Dept: WOUND CARE | Facility: CLINIC | Age: 62
End: 2022-06-16

## 2022-06-16 NOTE — TELEPHONE ENCOUNTER
Consult received via work queue from Lisa Pierre PA-C for wound of the fingers.    Please schedule with Elaine OCONNOR at [facility] for next available appointment as a RETURN appointment.    Is patient a RAFAEL lift? No    Routing to  Wound Healing Scheduling.

## 2022-06-24 DIAGNOSIS — E11.40 TYPE 2 DIABETES MELLITUS WITH DIABETIC NEUROPATHY, WITHOUT LONG-TERM CURRENT USE OF INSULIN (H): ICD-10-CM

## 2022-06-24 DIAGNOSIS — R73.9 HYPERGLYCEMIA: ICD-10-CM

## 2022-06-24 RX ORDER — INSULIN GLARGINE 100 [IU]/ML
INJECTION, SOLUTION SUBCUTANEOUS
Qty: 30 ML | Refills: 0 | Status: SHIPPED | OUTPATIENT
Start: 2022-06-24 | End: 2023-01-01

## 2022-06-30 ENCOUNTER — TRANSFERRED RECORDS (OUTPATIENT)
Dept: HEALTH INFORMATION MANAGEMENT | Facility: CLINIC | Age: 62
End: 2022-06-30

## 2022-07-02 ENCOUNTER — HEALTH MAINTENANCE LETTER (OUTPATIENT)
Age: 62
End: 2022-07-02

## 2022-07-05 ENCOUNTER — TRANSFERRED RECORDS (OUTPATIENT)
Dept: FAMILY MEDICINE | Facility: CLINIC | Age: 62
End: 2022-07-05

## 2022-07-22 ENCOUNTER — PATIENT OUTREACH (OUTPATIENT)
Dept: FAMILY MEDICINE | Facility: CLINIC | Age: 62
End: 2022-07-22

## 2022-07-22 NOTE — TELEPHONE ENCOUNTER
CDE registry repot:  HbA1c: 8.6    Pt has f/u scheduled with Dr. Meraz on 9/7 and will discuss need for further diabetes education during visit.     Pt enrolled in CDE, last visit 5/13/22 with recommendation to f/u in one month.     SB 3 PAL Welcome Letter Sent    Yudith LAMA RN, BSN, PHN - Patient Advocate Liaison - PAL RN  [facility]  [phone]

## 2022-07-22 NOTE — LETTER
Steve Morgan  [address]  [place] [address]    Thank you for choosing [facility] today for your health care needs.     [facility] is transforming primary care  At [facility], we re dedicated to constantly improve how we serve the health care needs of our patients and communities. We re currently making changes to the way we deliver care.     Changes you ll notice include:    An emphasis on building a relationship with a primary care provider    Access to a PAL (patient advocate and liaison) to help guide you with your care needs    Appointment lengths tailored to your specific needs and greater access to a care team to help you and your provider improve and maintain your health and well-being    Improved online access to your care team    Benefits of a primary care provider  If you don t have a designated primary care provider, we encourage you to get to know our care team online and find a provider you d like to see. Most of our providers have a short video on their online provider page. Visit [email or web address] to explore our providers and locations.    Benefits of having a primary care provider include:      They get to know you - your health history, family history and goals, making it easier to make a health plan together.     You get to know them - making health-related conversations and decisions easier      Primary care doctors help you when you re sick or hurt - but also focus on keeping you healthy with preventive care and screenings.      A doctor who sees you regularly is more likely to notice changes in your health.     You ll be connected to a broad care team who partners with your provider to support you.    Patient Advocate Liaison (PAL)   To help make sure you get the right care, at the right time, we include PALs, or Patient Advocate Liaisons, as part of your care team. Your PAL will be your first line of contact. They ll advocate for your needs and help you navigate  our services, connecting you with care team members and community resources to ensure your care is well coordinated. You ll be introduced to a PAL in an upcoming visit.     Expanded care team access with tailored appointment lengths  Depending on your health care needs, you may have longer or shorter appointments and see additional care team providers - including Medication Therapy Management (MTM) pharmacists, diabetes educators, behavioral health clinicians, or social workers. At times, they may be included in your visit with your provider, or you may see them individually.     Online access to your health care records and care team  [employer] is our online tool that makes it easy to see your health care information and communicate with your care team.     [employer]ia allows you to:     View your health maintenance plan so you know when you re due for a preventive screening    Send secure messages to your care team    View your health history and visit summaries     Schedule appointments     Complete questionnaires and eCheck-in before appointments      Get care from your provider with an e-visit      View and pay your bill     Sign up at [email or web address]. Once you have an account, you also can download the mobile suleman.     Connecting to fast and convenient care  When you need fast, convenient care - consider one of the following options:       Video Visit: A convenient care option for visiting with your provider out of the comfort of your own home. Most of the things you come to the clinic to address with your provider can now be done virtually through a video. This includes your chronic medication follow up, questions or concerns you may have, and even your annual Medicare Wellness Visit.       Phone Visit: Another convenient option for follow up of common problems that may require a more in-depth discussion with your provider.       E-visit: When you need acute care quickly, or have a quick question about your  medication, an E-visit is completed through [employer] and your provider will respond within one business day.      Yudith LAMA RN, BSN, PHN - Patient Advocate Liaison - PAL RN  [facility]  [phone]

## 2022-07-26 ENCOUNTER — TELEPHONE (OUTPATIENT)
Dept: NEUROLOGY | Facility: CLINIC | Age: 62
End: 2022-07-26

## 2022-07-26 ENCOUNTER — HOSPITAL ENCOUNTER (OUTPATIENT)
Dept: WOUND CARE | Facility: CLINIC | Age: 62
Discharge: HOME | End: 2022-07-26
Attending: PHYSICIAN ASSISTANT | Admitting: PHYSICIAN ASSISTANT
Payer: MEDICARE

## 2022-07-26 VITALS — TEMPERATURE: 98.4 F

## 2022-07-26 DIAGNOSIS — L98.491 SKIN ULCER OF FINGER, LIMITED TO BREAKDOWN OF SKIN (H): ICD-10-CM

## 2022-07-26 DIAGNOSIS — L98.492 ULCER OF FINGER, WITH FAT LAYER EXPOSED (H): Primary | ICD-10-CM

## 2022-07-26 PROBLEM — D12.2 BENIGN NEOPLASM OF ASCENDING COLON: Status: ACTIVE | Noted: 2021-10-25

## 2022-07-26 PROBLEM — K21.9 GASTROESOPHAGEAL REFLUX DISEASE WITHOUT ESOPHAGITIS: Status: ACTIVE | Noted: 2021-10-25

## 2022-07-26 PROBLEM — K64.9 HEMORRHOIDS: Status: ACTIVE | Noted: 2021-10-21

## 2022-07-26 PROBLEM — K63.5 POLYP OF COLON: Status: ACTIVE | Noted: 2021-10-21

## 2022-07-26 PROBLEM — K57.90 DIVERTICULAR DISEASE: Status: ACTIVE | Noted: 2021-10-21

## 2022-07-26 PROBLEM — K44.9 DIAPHRAGMATIC HERNIA: Status: ACTIVE | Noted: 2021-10-21

## 2022-07-26 PROBLEM — D12.3 BENIGN NEOPLASM OF TRANSVERSE COLON: Status: ACTIVE | Noted: 2021-10-25

## 2022-07-26 PROCEDURE — 97602 WOUND(S) CARE NON-SELECTIVE: CPT

## 2022-07-26 PROCEDURE — 99214 OFFICE O/P EST MOD 30 MIN: CPT | Performed by: PHYSICIAN ASSISTANT

## 2022-07-26 NOTE — TELEPHONE ENCOUNTER
[facility]  eMERGENCY dEPARTMENT eNCOUnter          CHIEF COMPLAINT       Chief Complaint   Patient presents with    Cough    Generalized Body Aches       Nurses Notes reviewed and I agree except as noted in the HPI. HISTORY OF PRESENT ILLNESS    Bryce Lopez is a 70 y.o. female who presents cough congestion. Patient states she has had subjective fevers at home and chills. Patient states that she thought she had the flu. She came here and was tested and did not have the flu. She also states her cough has been getting worse. She was not going to come in however they made her come in. Patient states she has some minor sinus congestion. She also has some mild rhinorrhea. Patient has not taken her temperature at home. Patient denies any chest pain. She has had no shortness of breath. No abdominal pain or changes in bowel or bladder habits. No syncopal or presyncopal episodes. Patient is otherwise resting comfortably on cot no apparent distress no other physical complaints at this time. REVIEW OF SYSTEMS     Review of Systems   Constitutional:  Positive for fatigue. Negative for activity change, appetite change, diaphoresis and unexpected weight change. HENT:  Positive for congestion. Negative for ear discharge, ear pain, hearing loss, rhinorrhea, sinus pressure, sore throat, trouble swallowing and voice change. Eyes:  Negative for photophobia, pain, discharge, redness and itching. Respiratory:  Positive for cough. Negative for choking, chest tightness, shortness of breath and wheezing. Cardiovascular:  Negative for chest pain, palpitations and leg swelling. Gastrointestinal:  Negative for abdominal distention, abdominal pain, blood in stool, constipation, diarrhea, nausea and vomiting. Endocrine: Negative for polydipsia, polyphagia and polyuria.    Genitourinary:  Negative for decreased urine volume, difficulty urinating, dysuria, enuresis, frequency, hematuria Spoke with Lucía.  Pt is not on medicare- so can be signed by Diana Butler.  I will route to her.   and urgency. Musculoskeletal:  Negative for arthralgias, back pain, gait problem, myalgias, neck pain and neck stiffness. Skin:  Negative for pallor and rash. Allergic/Immunologic: Negative for immunocompromised state. Neurological:  Negative for dizziness, tremors, seizures, syncope, facial asymmetry, weakness, light-headedness, numbness and headaches. Hematological:  Negative for adenopathy. Does not bruise/bleed easily. Psychiatric/Behavioral:  Negative for agitation, hallucinations and suicidal ideas. The patient is not nervous/anxious. PAST MEDICAL HISTORY    has a past medical history of Anxiety, COPD (chronic obstructive pulmonary disease) ([facility] [address] GERD (gastroesophageal reflux disease), Hyperlipidemia, Hypertension, Psychiatric problem, Seizures ([facility] [address] and Thyroid disease. SURGICAL HISTORY      has a past surgical history that includes Abdomen surgery; Colonoscopy; eye surgery (2010 approx); INOCENCIA STEREO BREAST BX W LOC DEVICE 1ST LESION LEFT (Left, 2012); and INOCENCIA STEREO BREAST BX W LOC DEVICE 1ST LESION RIGHT (Right, 2019).     CURRENT MEDICATIONS       Discharge Medication List as of 7/26/2022  2:09 AM        CONTINUE these medications which have NOT CHANGED    Details   amLODIPine (NORVASC) 5 MG tablet Take 1 tablet by mouth daily Hold for SBP less than 100 mmHg, Disp-30 tablet, R-3Normal      Multiple Vitamin (MULTIVITAMIN) TABS tablet Take 1 tablet by mouth daily, Disp-30 tablet, R-0Normal      vitamin B-1 (THIAMINE) 100 MG tablet Take 1 tablet by mouth daily, Disp-30 tablet, [id number]      folic acid (FOLVITE) 1 MG tablet Take 1 tablet by mouth daily, Disp-30 tablet, R-0Normal      metFORMIN (GLUCOPHAGE-XR) 750 MG extended release tablet Take 750 mg by mouth Daily with supperHistorical Med      amitriptyline (ELAVIL) 150 MG tablet Take 150 mg by mouth nightlyHistorical Med      venlafaxine (EFFEXOR XR) 150 MG extended release capsule Take 150 mg by mouth dailyHistorical Med cetirizine (ZYRTEC) 10 MG tablet Take 10 mg by mouth dailyHistorical Med      zolpidem (AMBIEN) 10 MG tablet Take 10 mg by mouth nightly. Historical Med      alendronate (FOSAMAX) 70 MG tablet Take 70 mg by mouth every 7 daysHistorical Med      LORazepam (ATIVAN) 1 MG tablet Take 1 mg by mouth 3 times daily. Historical Med      atorvastatin (LIPITOR) 40 MG tablet Take 1 tablet by mouth every evening, Disp-30 tablet, R-0      lisinopril (PRINIVIL;ZESTRIL) 10 MG tablet Take 10 mg by mouth daily Historical Med      levothyroxine (SYNTHROID) 50 MCG tablet Take 1 tablet by mouth Daily, Disp-30 tablet, R-3      omeprazole (PRILOSEC) 20 MG capsule Take 20 mg by mouth 2 times daily as needed       fluticasone (FLONASE) 50 MCG/ACT nasal spray 2 sprays by Nasal route daily Indications: Use two sprays in each nostril once daily             ALLERGIES     is allergic to penicillins. FAMILY HISTORY     She indicated that her mother is . She indicated that her father is . She indicated that the status of her neg hx is unknown.   family history includes Esophageal Cancer (age of onset: 47) in her mother; Heart Disease in her mother; High Blood Pressure in her mother; High Cholesterol in her mother; Kidney Disease in her father. SOCIAL HISTORY      reports that she has quit smoking. She has a 1.50 pack-year smoking history. She has never used smokeless tobacco. She reports current alcohol use of about 2.0 standard drinks per week. She reports that she does not use drugs. PHYSICAL EXAM     INITIAL VITALS:  height is 5' 5\" (1.651 m) and weight is 139 lb (63 kg). Her oral temperature is 98.1 °F (36.7 °C). Her blood pressure is 132/73 and her pulse is 109 (abnormal). Her respiration is 16 and oxygen saturation is 96%. Physical Exam  Vitals and nursing note reviewed. Constitutional:       General: She is not in acute distress. Appearance: She is well-developed. She is not diaphoretic.    HENT:      Head: Normocephalic and atraumatic. Right Ear: External ear normal.      Left Ear: External ear normal.      Nose: Nose normal.      Mouth/Throat:      Pharynx: No oropharyngeal exudate. Eyes:      General: No scleral icterus. Right eye: No discharge. Left eye: No discharge. Conjunctiva/sclera: Conjunctivae normal.      Pupils: Pupils are equal, round, and reactive to light. Neck:      Thyroid: No thyromegaly. Vascular: No JVD. Trachea: No tracheal deviation. Cardiovascular:      Rate and Rhythm: Normal rate and regular rhythm. Pulses:           Carotid pulses are 2+ on the right side and 2+ on the left side. Radial pulses are 2+ on the right side and 2+ on the left side. Femoral pulses are 2+ on the right side and 2+ on the left side. Popliteal pulses are 2+ on the right side and 2+ on the left side. Dorsalis pedis pulses are 2+ on the right side and 2+ on the left side. Posterior tibial pulses are 2+ on the right side and 2+ on the left side. Heart sounds: Normal heart sounds, S1 normal and S2 normal. No murmur heard. No friction rub. No gallop. Pulmonary:      Effort: Pulmonary effort is normal.      Breath sounds: Normal breath sounds. No stridor. No decreased breath sounds, wheezing, rhonchi or rales. Chest:      Chest wall: No tenderness. Abdominal:      General: Bowel sounds are normal. There is no distension. Palpations: Abdomen is soft. There is no mass. Tenderness: no abdominal tenderness There is no guarding or rebound. Hernia: No hernia is present. Musculoskeletal:         General: No tenderness. Normal range of motion. Cervical back: Normal range of motion and neck supple. Right lower leg: No edema. Left lower leg: No edema. Lymphadenopathy:      Cervical: No cervical adenopathy. Skin:     General: Skin is warm and dry.       Capillary Refill: Capillary refill takes less than 2 seconds. Findings: No bruising, ecchymosis, lesion or rash. Neurological:      General: No focal deficit present. Mental Status: She is alert and oriented to person, place, and time. Mental status is at baseline. GCS: GCS eye subscore is 4. GCS verbal subscore is 5. GCS motor subscore is 6. Cranial Nerves: No cranial nerve deficit. Sensory: No sensory deficit. Motor: No weakness. Coordination: Coordination normal.      Gait: Gait normal.      Deep Tendon Reflexes: Reflexes are normal and symmetric. Psychiatric:         Speech: Speech normal.         Behavior: Behavior normal.         Thought Content: Thought content normal.         Judgment: Judgment normal.         DIFFERENTIAL DIAGNOSIS:   Pneumonia bronchitis COVID influenza sinusitis bronchitis    DIAGNOSTIC RESULTS     EKG: All EKG's are interpreted by the Emergency Department Physician who either signs or Co-signs this chart in the absence of a cardiologist.  None    RADIOLOGY: non-plain film images(s) such as CT, Ultrasound and MRI are read by the radiologist.  XR CHEST (2 VW)   Final Result   1. Scattered atelectasis within the bilateral lower lungs. No    consolidation or pleural effusion.       This document has been electronically signed by: Kati Arrington M.D. on    07/25/2022 11:28 PM            LABS:   Labs Reviewed   CBC WITH AUTO DIFFERENTIAL - Abnormal; Notable for the following components:       Result Value    MCHC 32.1 (*)     All other components within normal limits   MAGNESIUM - Abnormal; Notable for the following components:    Magnesium 1.5 (*)     All other components within normal limits   COMPREHENSIVE METABOLIC PANEL - Abnormal; Notable for the following components:    Glucose 198 (*)     CO2 22 (*)     Total Bilirubin <0.2 (*)     All other components within normal limits   GLOMERULAR FILTRATION RATE, ESTIMATED - Abnormal; Notable for the following components:    Est, Glom Filt Rate 84 (*)     All other components within normal limits   RAPID INFLUENZA A/B ANTIGENS   COVID-19, RAPID   TROPONIN   BRAIN NATRIURETIC PEPTIDE   ANION GAP   OSMOLALITY       EMERGENCY DEPARTMENT COURSE:   Vitals:    Vitals:    07/25/22 2248 07/26/22 0200 07/26/22 0201   BP: (!) 140/73 (!) 152/84 132/73   Pulse: (!) 123 (!) 109    Resp: 18 16    Temp: 98.1 °F (36.7 °C)     TempSrc: Oral     SpO2: 97% 96%    Weight: 139 lb (63 kg)     Height: 5' 5\" (1.651 m)       Patient was assessed at bedside labs and imaging were ordered. Here today I evaluated the patient. The patient does not have influenza or COVID. Labs were ordered. There is no white blood cell count. Patient has what appears to be mild pneumonia developing in her lung bases. Patient is hemodynamically stable and afebrile. She feels comfortable going home. She felt much better after the cough medication. I will give her a dose of Levaquin here. She will be given Levaquin and cough medication for at home. She is instructed to take those as prescribed and follow-up as directed. She is instructed to follow-up with her primary care physician and call for an appointment within the next 1 to 2 days. Patient understood and agreed to the plan. Patient is subsequently discharged home in stable condition. The patient has what appears to be a mild pneumonia. Patient has been given Levaquin and cough medication she is instructed to take those as prescribed. She is instructed to use Tylenol Motrin for any pain or fevers. She is instructed to follow-up with a primary care physician to do so within the next 1 to 2 days. She is instructed return to the nearest emergency room immediately for any new or worsening complaints. CRITICAL CARE:   None    CONSULTS:  None    PROCEDURES:  None    FINAL IMPRESSION      1.  Pneumonia of both lower lobes due to infectious organism          DISPOSITION/PLAN   Discharge    PATIENT REFERRED TO:  Shawn Bolton  [address]  [phone]    Call in 1 day      DISCHARGE MEDICATIONS:  Discharge Medication List as of 7/26/2022  2:09 AM        START taking these medications    Details   guaiFENesin-dextromethorphan (ROBITUSSIN DM) 100-10 MG/5ML syrup Take 5 mLs by mouth 3 times daily as needed for Cough, Disp-120 mL, R-0Print      levoFLOXacin (LEVAQUIN) 500 MG tablet Take 1 tablet by mouth in the morning for 10 days. , Disp-10 tablet, R-0Print             (Please note that portions of this note were completed with a voice recognition program.  Efforts were made to edit the dictations but occasionally words are mis-transcribed.)    Freddy Jefferson, [address] [facility],   07/26/22 0074

## 2022-07-26 NOTE — TELEPHONE ENCOUNTER
[facility] Call Center    Phone Message    May a detailed message be left on voicemail: yes     Reason for Call: Appointment Intake    Referring Provider Name: Paulina Abarca PA-C  Diagnosis and/or Symptoms: Ulcer of finger, with fat layer exposed     Referral Notes: Patient is having progressive loss of function in hands and on-going wounds from function loss.    Dx is not listed in our protocols. Please review and contact the patient with scheduling options.    Action Taken: Message routed to:  Other: [facility]    Travel Screening: Not Applicable

## 2022-07-26 NOTE — PROGRESS NOTES
[facility]    ASSESSMENT:   1. (L98.492) Ulcer of fingers, with fat layer exposed   2. Worsening neuropathy of hands    PLAN/DISCUSSION:   1. Has had progressive weakness and loss of function in his hands. I would like him to reestablish with neurology and place da referral today. I also placed a referral to hand therapy to help him with function and injury prevention.   2. This is a difficult area to dress due to handwashing. I recommended he utilize skin prep and hydrocolloid dressings that he can purchase of [employer] or at the drugstore. We could also try a liquid bandage type product in the future.     HISTORY OF PRESENT ILLNESS:   Steve Morgan is a 62 year old male with poorly controlled DM, hypothyroidism, DAMIÁN, PAD, BPH, vit B12 deficiency, and obesity who returns today for multiple finger wounds. He is known to me for prior hard to heal finger wounds. He has poor sensation and function of his hands due to neuropathy and feels this is worsening. This causes him to injure them frequently. Has not followed with neurology in years.     Wound (used by [facility] only) 07/30/21 1008 Right laceration (Active)   Thickness/Stage full thickness 07/26/22 1335   Base scab;moist 07/26/22 1335   Periwound contracted 07/26/22 1335   Periwound Temperature warm 07/26/22 1335   Periwound Skin Turgor soft 07/26/22 1335   Length (cm) 0.2 07/26/22 1335   Width (cm) 1.7 07/26/22 1335   Depth (cm) 0.1 07/26/22 1335   Wound (cm^2) 0.34 cm^2 07/26/22 1335   Wound Volume (cm^3) 0.03 cm^3 07/26/22 1335   Wound healing % 87.41 07/26/22 1335   Drainage Characteristics/Odor serous;serosanguineous 07/26/22 1335   Drainage Amount scant 07/26/22 1335   Care, Wound non-select wound debridement performed 07/26/22 1335       Wound (used by OP [facility] only) 07/30/21 1009 Left laceration (Active)   Thickness/Stage full thickness 07/26/22 1335   Base pink 07/26/22 1335   Periwound intact 07/26/22 1335   Periwound Temperature warm  07/26/22 1335   Periwound Skin Turgor soft 07/26/22 1335   Edges open 07/26/22 1335   Length (cm) 0.8 07/26/22 1335   Width (cm) 0.3 07/26/22 1335   Depth (cm) 0.1 07/26/22 1335   Wound (cm^2) 0.24 cm^2 07/26/22 1335   Wound Volume (cm^3) 0.02 cm^3 07/26/22 1335   Wound healing % -50 07/26/22 1335   Drainage Characteristics/Odor serosanguineous 07/26/22 1335   Drainage Amount moderate 07/26/22 1335   Care, Wound non-select wound debridement performed 07/26/22 1335       Wound (used by [facility] only) 07/30/21 1011 Left laceration (Active)   Thickness/Stage full thickness 07/26/22 1335   Base pink 07/26/22 1335   Periwound intact 07/26/22 1335   Periwound Temperature warm 07/26/22 1335   Periwound Skin Turgor soft 07/26/22 1335   Edges open 07/26/22 1335   Length (cm) 0.2 07/26/22 1335   Width (cm) 1.8 07/26/22 1335   Depth (cm) 0.1 07/26/22 1335   Wound (cm^2) 0.36 cm^2 07/26/22 1335   Wound Volume (cm^3) 0.04 cm^3 07/26/22 1335   Wound healing % 79.43 07/26/22 1335   Drainage Characteristics/Odor serosanguineous 07/26/22 1335   Drainage Amount moderate 07/26/22 1335   Care, Wound non-select wound debridement performed 07/26/22 1335       Wound (used by [facility] only) 07/26/22 1337 Right 2 finger (Active)   Thickness/Stage full thickness 07/26/22 1335   Base pink 07/26/22 1335   Periwound intact 07/26/22 1335   Periwound Temperature warm 07/26/22 1335   Periwound Skin Turgor soft 07/26/22 1335   Edges open 07/26/22 1335   Length (cm) 1 07/26/22 1335   Width (cm) 1.1 07/26/22 1335   Depth (cm) 0.1 07/26/22 1335   Wound (cm^2) 1.1 cm^2 07/26/22 1335   Wound Volume (cm^3) 0.11 cm^3 07/26/22 1335   Drainage Characteristics/Odor serosanguineous 07/26/22 1335   Drainage Amount moderate 07/26/22 1335   Care, Wound non-select wound debridement performed 07/26/22 1335                MDM: 30-39 minutes were spent on the date of the visit reviewing previous chart notes, evaluating patient and developing the treatment plan, this  excludes any time spent on procedures.       Further instructions from your care team       Steve Morgan      1960    Wound Dressing Change: Right and Left hand fingers  Cleanse with mild unscented soap and water.  Apply [employer] hydrocolloid bandages. Purchase at [employer] or on [employer]  Change whenever the dressing falls off.    A referral was placed to hand therapy. Please call [phone] to schedule.    A referral was placed to neurology. Please call [phone] to schedule.     Paulina Abarca PA-C July 26, 2022    Call us at [phone] if you have any questions about your wounds, have redness or swelling around your wound, have a fever of 101 or greater or if you have any other problems or concerns. We answer the phone Monday through Friday 8 am to 4 pm, please leave a message as we check the voicemail frequently throughout the day.     If you had a positive experience please indicate that on your patient satisfaction survey form that [employer] will be sending you.    It was a pleasure meeting with you today.  Thank you for allowing me and my team the privilege of caring for you today.  YOU are the reason we are here, and I truly hope we provided you with the excellent service you deserve.  Please let us know if there is anything else we can do for you so that we can be sure you are leaving completely satisfied with your care experience.      If you have any billing related questions please call the [facility] Business office at [phone]. The clinic staff does not handle billing related matters.    If you are scheduled have a follow up appointment, you will receive a reminder call the day before your visit. If you are unable to keep that appointment, please call the clinic to cancel or reschedule.

## 2022-07-26 NOTE — DISCHARGE INSTRUCTIONS
Steve Morgan      1960    Wound Dressing Change: Right and Left hand fingers  Cleanse with mild unscented soap and water.  Apply [employer] hydrocolloid bandages. Purchase at [employer] or on [employer]  Change whenever the dressing falls off.    A referral was placed to hand therapy. Please call [phone] to schedule.    A referral was placed to neurology. Please call [phone] to schedule.     Paulina Abarca PA-C July 26, 2022    Call us at [phone] if you have any questions about your wounds, have redness or swelling around your wound, have a fever of 101 or greater or if you have any other problems or concerns. We answer the phone Monday through Friday 8 am to 4 pm, please leave a message as we check the voicemail frequently throughout the day.     If you had a positive experience please indicate that on your patient satisfaction survey form that [facility] will be sending you.    It was a pleasure meeting with you today.  Thank you for allowing me and my team the privilege of caring for you today.  YOU are the reason we are here, and I truly hope we provided you with the excellent service you deserve.  Please let us know if there is anything else we can do for you so that we can be sure you are leaving completely satisfied with your care experience.      If you have any billing related questions please call the [facility] Business office at [phone]. The clinic staff does not handle billing related matters.    If you are scheduled have a follow up appointment, you will receive a reminder call the day before your visit. If you are unable to keep that appointment, please call the clinic to cancel or reschedule.

## 2022-08-01 NOTE — TELEPHONE ENCOUNTER
This could be many things and not necessarily neuromuscular. Please schedule with General Neurology first, thank you

## 2022-08-02 ENCOUNTER — TELEPHONE (OUTPATIENT)
Dept: FAMILY MEDICINE | Facility: CLINIC | Age: 62
End: 2022-08-02

## 2022-08-02 NOTE — TELEPHONE ENCOUNTER
Patient Quality Outreach    Patient is due for the following:   Diabetes -  Eye Exam    Next Steps:   No follow up needed at this time.    Type of outreach:    Pt had an eye exam at [facility] on 3/29/22 & 5/17/22      Questions for provider review:    None     Margaret Walters, [facility]

## 2022-08-04 NOTE — PROGRESS NOTES
Hand Therapy Initial Evaluation    Current Date:  8/4/2022    Diagnosis: Ulcer of finger, with fat layer exposed  DOI: ~gradual onset (7/26/2022 MD order date)    Precautions: Open finger wounds    Subjective:  Steve Morgan is a 62 year old male.    Answers for HPI/ROS submitted by the patient on 8/15/2022  Reason for Visit:: PT for hands  When problem began:: 1/1/2001  How problem occurred:: It is not an injury.  I am not sure how it happened  Number scale: 0/10  General health as reported by patient: poor  Please check all that apply to your current or past medical history: depression, diabetes, high blood pressure, overweight, sleep disorder/apnea, thyroid problems  Medical allergies: none  Surgeries: other  Other Surgery Detail: Remove Cataracts, Groin Surgery when I was 16  Medications you are currently taking: anti-depressants, high blood pressure medication, thyroid medication, other  Other Meds Detail: See all medications on my chart  Occupation:: On disability  What are your primary job tasks: computer work, driving, prolonged sitting    Occupational Profile Information:  Left hand dominant and Ambidextrous  Prior functional level:  independent-shared household chores  Patient reports symptoms of stiffness/loss of motion and weakness/loss of strength  Special tests:  see chart.    Previous treatment: None  Barriers include:none  Mobility: Ambulates with aid of cane  Transportation: drives  Currently not working due to present treatment problem. Previously worked as an  though was slowly unable to hold a pen.  Other: Motivated to continue writing, using buttons (not using d/t dexterity),     Functional Outcome Measure:   Upper Extremity Functional Index Score:  SCORE:   Column Totals: /80: 57   (A lower score indicates greater disability.)    Objective:  Pain Level (Scale 0-10)   8/17/2022   At Rest 0/10   With Use 0/10     Pain Description  Date 8/17/2022   Location thumb, index finger, long  finger, ring finger and small finger   Pain Quality Numb and Stiff   Frequency intermittent     Pain is worst  daytime   Exacerbated by  pinching   Relieved by stretch and none   Progression Gradually worsening, though slow progression     Edema  None    Sensation   Constantly - pt has neuropathy in hands and feet, unable to sense light pressure/textures. Can sense deep touch/pressure. Often drops items    ROM  Thumb 8/17/2022 8/17/2022   AROM  (PROM) R L   MP -26/55 -23/51   IP /61 /55   RABD 59 53   PABD 0 0     ROM  Hand 8/17/2022 8/17/2022   AROM(PROM) R L   Index MP / /   PIP -41/ -69/   DIP 0/ -18/   SCHULTZ     Long MP / /   PIP -33/ -47/   DIP -35/ -8/   SCHULTZ     Ring MP / /   PIP -33/ -52/   DIP 0/ -17/   SCHULTZ     Small MP / /   PIP -36/ -66/   DIP / -19/   SCHULTZ       Strength  Nt d/t open wounds    Deferred semmes poppy d/t bandages    Assessment:  Patient presents with symptoms consistent with diagnosis of bilateral hand  Ulcer of fingers,  with conservative intervention.     Patient's limitations or Problem List includes:  Pain, Decreased ROM/motion, Increased edema, Weakness and Sensory disturbance of the bilateral hand, index finger, long finger and ring finger which interferes with the patient's ability to perform Self Care Tasks (dressing, eating, bathing, hygiene/toileting), Work Tasks, Sleep Patterns, Recreational Activities, Household Chores and Driving  as compared to previous level of function.    Rehab Potential:  Good - Return to full activity, some limitations    Patient will benefit from skilled Occupational Therapy to increase ROM and flexibility and decrease pain to return to previous activity level and resume normal daily tasks and to reach their rehab potential.    Barriers to Learning:  No barrier    Communication Issues:  Patient appears to be able to clearly communicate and understand verbal and written communication and follow directions correctly.    Chart Review: Chart Review and  Brief history including review of medical and/or therapy records relating to the presenting problem    Identified Performance Deficits: bathing/showering, toileting, dressing, feeding, functional mobility, personal device care, hygiene and grooming, driving and community mobility, health management and maintenance, home establishment and management, meal preparation and cleanup, shopping, sleep and leisure activities    Assessment of Occupational Performance:  5 or more Performance Deficits    Clinical Decision Making (Complexity): Low complexity    Treatment Explanation:  The following has been discussed with the patient:  RX ordered/plan of care  Anticipated outcomes  Possible risks and side effects    Plan:  Frequency:  1 X week, once daily  Duration:  for 6 weeks    Treatment Plan:   Modalities:  US, Fluidotherapy and Paraffin  Therapeutic Exercise:  AROM, AAROM, PROM, Tendon Gliding, Blocking, Reverse Blocking, Place and Hold, Contract Relax, Extensor Tracking, Isotonics, Isometrics and Stabilization    Neuromuscular re-education:  Nerve Gliding, Coordination/Dexterity, Sensory re-education, Desensitization, Kinesthetic Training, Proprioceptive Training, Posture, Kinesiotaping, Strain Counter Strain, Isometrics, Stabilization   Manual Techniques:  Coordination/Dexterity, Joint mobilization, Scar mobilization, Friction massage, Myofascial release, Manual edema mobilization   Orthotic Fabrication:  Static  Self Care:  Self Care Tasks, Ergonomic Considerations and Work     Discharge Plan:  Achieve all LTG.  Independent in home treatment program.  Reach maximal therapeutic benefit.    Home Exercise Program:  Thumb Joint Protection  EMR Notes  HEP - Sets  Reps  Sessions per day  Notes  Education Sheet General  EMR Notes  HEP - Sets  Reps  Sessions per day  Notes  https://www.[employer]/Special-Supplies-Scissors-Colorful-Easy-Open/dp/N19XHTVXJ7/ref=sr_1_1_sspa?crid=[id number]&keywords=adaptive+scissors&xsu=2005757456&sprefix=adaptive+scissors%2Caps%2C83&sr=8-1-spons&psc=1 https://www.[employer]/tubing%EF%BC%8CBuilt-Utensils%EF%BC%8CFoam-%EF%BC%8CCylindrical-Foam%EF%BC%8CSilverware-Hands%EF%BC%8CUtensil/dp/W61X1ZAFX8/ref=sr_1_1_sspa?crid=[id number]&keywords=foam+tubing&vvq=[id number]&sprefix=foam+tubing%2Caps%2C86&sr=8-1-spons&psc=1 https://www.[employer]/Special-Supplies-Adaptive-Arthritis-Parkinsons/dp/F18CYENZSR/ref=sr_1_2_sspa?crid=[email or web address]&keywords=adaptive+silverware&cmx=[id number]&sprefix=adaptive+silverware%2Caps%2C86&sr=8-2-spons&psc=1  Finger Passive Range of Motion PIP Joint Extension  EMR Notes  HEP - Sets 1  Reps 5 - 10-15 seconds each repetition  Sessions per day 3  Notes Gentle, pain free  Thumb Passive Range of Motion Radial Abduction  EMR Notes  HEP - Sets 1  Reps 5 repetitions - 10-15 second each repetition  Sessions per day 3  Notes  Finger Passive Range of Motion Tracking for Finger Extension on Table With Assist (#2)  EMR Notes  HEP - Sets 1  Reps 10  Sessions per day 3  Notes *Do not drag your hand across the table to avoid putting too much traction on the wounds and to avoid opening up the bandages *Use your opposite hand to stretch your fingers flat against the table, holding for approximately 10-15 seconds per hand    Next Visit:  Check HEP, progress as tolerated  Consider splinting for nighttime

## 2022-08-17 ENCOUNTER — THERAPY VISIT (OUTPATIENT)
Dept: OCCUPATIONAL THERAPY | Facility: CLINIC | Age: 62
End: 2022-08-17
Attending: PHYSICIAN ASSISTANT
Payer: MEDICARE

## 2022-08-17 DIAGNOSIS — L98.492 ULCER OF FINGER, WITH FAT LAYER EXPOSED (H): ICD-10-CM

## 2022-08-17 PROCEDURE — 97165 OT EVAL LOW COMPLEX 30 MIN: CPT | Mod: GO | Performed by: OCCUPATIONAL THERAPIST

## 2022-08-17 PROCEDURE — 97110 THERAPEUTIC EXERCISES: CPT | Mod: GO | Performed by: OCCUPATIONAL THERAPIST

## 2022-08-17 NOTE — PROGRESS NOTES
[facility]    OUTPATIENT Occupational Therapy ORTHOPEDIC EVALUATION  PLAN OF TREATMENT FOR OUTPATIENT REHABILITATION  (COMPLETE FOR INITIAL CLAIMS ONLY)  Patient's Last Name, First Name, M.I.  YOB: 1960  Steve Morgan    Provider s Name:  [facility]   Medical Record No.  [id number]   Start of Care Date:  08/17/22   Onset Date:   07/26/22 (order date)   Type:     ___PT   _X__OT Medical Diagnosis:    Encounter Diagnosis   Name Primary?    Ulcer of finger, with fat layer exposed (H)         Treatment Diagnosis:  Finger ulcers        Goals:     08/17/22 0500   Goal #1   Goal #1 dressing   Previous Performance Level Independent   Current Functional Task Button   Current Performance Level Extreme difficulty donning buttons   STG Target Performance Top button of shirt   STG Target Perform Level Moderate difficulty using adaptive button hook   Due Date  09/14/22   LTG Target Task/Performance Uses Adaptive Equipment   Due date 09/28/22       Therapy Frequency:  1x/week  Predicted Duration of Therapy Intervention:  6 weeks    Beatrice Marroquin OT                 I CERTIFY THE NEED FOR THESE SERVICES FURNISHED UNDER        THIS PLAN OF TREATMENT AND WHILE UNDER MY CARE     (Physician attestation of this document indicates review and certification of the therapy plan).                     Certification Date From:  08/17/22   Certification Date To:  09/28/22    Referring Provider:  Paulina Abarca    Initial Assessment        See Epic Evaluation SOC Date: 08/17/22

## 2022-08-19 ENCOUNTER — HOSPITAL ENCOUNTER (OUTPATIENT)
Dept: WOUND CARE | Facility: CLINIC | Age: 62
Discharge: HOME | End: 2022-08-19
Attending: PHYSICIAN ASSISTANT | Admitting: PHYSICIAN ASSISTANT
Payer: MEDICARE

## 2022-08-19 VITALS — TEMPERATURE: 97.3 F | DIASTOLIC BLOOD PRESSURE: 85 MMHG | SYSTOLIC BLOOD PRESSURE: 160 MMHG | HEART RATE: 71 BPM

## 2022-08-19 DIAGNOSIS — I73.9 PVD (PERIPHERAL VASCULAR DISEASE) (H): ICD-10-CM

## 2022-08-19 DIAGNOSIS — L98.492 ULCER OF FINGER, WITH FAT LAYER EXPOSED (H): Primary | ICD-10-CM

## 2022-08-19 PROCEDURE — 97602 WOUND(S) CARE NON-SELECTIVE: CPT

## 2022-08-19 PROCEDURE — 99214 OFFICE O/P EST MOD 30 MIN: CPT | Performed by: PHYSICIAN ASSISTANT

## 2022-08-19 NOTE — DISCHARGE INSTRUCTIONS
Steve Morgan      1960    Please call the scheduling  to schedule your arterial ultrasound appointment at [facility] [phone] ([address], [address], [place], [place])      A referral was placed to neurology. Please call [phone] to schedule.    Wound Dressing Change: Right and Left hand fingers  Cleanse with mild unscented soap and water.  Apply skin prep to skin around wound  Apply All-Health hydrocolloid bandages. Purchase at [employer] or on [employer]  Change whenever the dressing falls off.    Both hands:   Apply Aquaphor, CeraVe ointment or Vaseline every night.    Both legs:  Moisturize daily with high quality, hypoallergenic moisturizer. Can use same as above if you wish.  Walk as much as you can, as you are able. Whenever you sit raise your ankle above your hips to promote wound healing.       Paulina Abarca PA-C August 19, 2022    Call us at [phone] if you have any questions about your wounds, have redness or swelling around your wound, have a fever of 101 or greater or if you have any other problems or concerns. We answer the phone Monday through Friday 8 am to 4 pm, please leave a message as we check the voicemail frequently throughout the day.     If you had a positive experience please indicate that on your patient satisfaction survey form that [facility] will be sending you.    It was a pleasure meeting with you today.  Thank you for allowing me and my team the privilege of caring for you today.  YOU are the reason we are here, and I truly hope we provided you with the excellent service you deserve.  Please let us know if there is anything else we can do for you so that we can be sure you are leaving completely satisfied with your care experience.      If you have any billing related questions please call the [facility] Business office at [phone]. The clinic staff does not handle billing related matters.    If you are scheduled have a  follow up appointment, you will receive a reminder call the day before your visit. If you are unable to keep that appointment, please call the clinic to cancel or reschedule.

## 2022-08-19 NOTE — PROGRESS NOTES
[facility]    ASSESSMENT:   1. (L98.492) Ulcer of fingers, with fat layer exposed   2. Worsening neuropathy of hands    PLAN/DISCUSSION:   1. Wounds improving  2. Continue to use skin prep and hydrocolloid  3. Use aquaphor on hands at night  4. Encouraged him to call for consult with neurology due to progressive worsening of peripheral neuropathy  5. Continue hand therapy  6. Obtain upper extremity arterial US to r/o poor perfusion    INTERVAL HISTORY:  Returns for follow-up. The left hand wounds have now healed. Has been using hydrocolloid bandaids and these are staying on about 24 hours. Right finger wounds improving but not healed. Right third finger with decreased capillary refill than other fingers. Never heard from neurology consult. Started hand therapy and he is hopeful that this will be helpful.    HISTORY OF PRESENT ILLNESS:   Steve Morgan is a 62 year old male with poorly controlled DM, hypothyroidism, DAMIÁN, PAD, BPH, vit B12 deficiency, and obesity who returns today for multiple finger wounds. He is known to me for prior hard to heal finger wounds. He has poor sensation and function of his hands due to neuropathy and feels this is worsening. This causes him to injure them frequently. Has not followed with neurology in years.     WOUND EXAM:  Wound (used by OP [facility] only) 07/30/21 1008 Right laceration (Active)   Length (cm) 1.6 08/19/22 1100   Width (cm) 2.2 08/19/22 1100   Depth (cm) 0.1 08/19/22 1100   Wound (cm^2) 3.52 cm^2 08/19/22 1100   Wound Volume (cm^3) 0.35 cm^3 08/19/22 1100   Wound healing % -30.37 08/19/22 1100   Drainage Characteristics/Odor serosanguineous 08/19/22 1100       Wound (used by OP [facility] only) 07/26/22 1337 Right 2 finger (Active)   Length (cm) 1.8 08/19/22 1100   Width (cm) 1.9 08/19/22 1100   Depth (cm) 0.3 08/19/22 1100   Wound (cm^2) 3.42 cm^2 08/19/22 1100   Wound Volume (cm^3) 1.03 cm^3 08/19/22 1100   Wound healing % -210.91 08/19/22 1100   Drainage  Characteristics/Odor serosanguineous 08/19/22 1100   Drainage Amount moderate 08/19/22 1100            MDM: 38 minutes were spent on the date of the visit reviewing previous chart notes, evaluating patient and developing the treatment plan, this excludes any time spent on procedures.       Further instructions from your care team       Steve Morgan      1960    Please call the scheduling  to schedule your arterial ultrasound appointment at [facility] [phone] ([address], [address], [place], [place])      A referral was placed to neurology. Please call [phone] to schedule.    Wound Dressing Change: Right and Left hand fingers  Cleanse with mild unscented soap and water.  Apply skin prep to skin around wound  Apply All-Health hydrocolloid bandages. Purchase at [employer] or on [employer]  Change whenever the dressing falls off.    Both hands:   Apply Aquaphor, CeraVe ointment or Vaseline every night.    Both legs:  Moisturize daily with high quality, hypoallergenic moisturizer. Can use same as above if you wish.  Walk as much as you can, as you are able. Whenever you sit raise your ankle above your hips to promote wound healing.       Paulina Abarca PA-C August 19, 2022    Call us at [phone] if you have any questions about your wounds, have redness or swelling around your wound, have a fever of 101 or greater or if you have any other problems or concerns. We answer the phone Monday through Friday 8 am to 4 pm, please leave a message as we check the voicemail frequently throughout the day.     If you had a positive experience please indicate that on your patient satisfaction survey form that [facility] will be sending you.    It was a pleasure meeting with you today.  Thank you for allowing me and my team the privilege of caring for you today.  YOU are the reason we are here, and I truly hope we provided you with the excellent service you deserve.  Please  let us know if there is anything else we can do for you so that we can be sure you are leaving completely satisfied with your care experience.      If you have any billing related questions please call the [facility] Business office at [phone]. The clinic staff does not handle billing related matters.    If you are scheduled have a follow up appointment, you will receive a reminder call the day before your visit. If you are unable to keep that appointment, please call the clinic to cancel or reschedule.

## 2022-09-01 ENCOUNTER — LAB (OUTPATIENT)
Dept: LAB | Facility: CLINIC | Age: 62
End: 2022-09-01
Payer: MEDICARE

## 2022-09-01 DIAGNOSIS — E03.4 HYPOTHYROIDISM DUE TO ACQUIRED ATROPHY OF THYROID: ICD-10-CM

## 2022-09-01 DIAGNOSIS — Z79.4 TYPE 2 DIABETES MELLITUS WITH DIABETIC NEUROPATHY, WITH LONG-TERM CURRENT USE OF INSULIN (H): ICD-10-CM

## 2022-09-01 DIAGNOSIS — E11.40 TYPE 2 DIABETES MELLITUS WITH DIABETIC NEUROPATHY, WITH LONG-TERM CURRENT USE OF INSULIN (H): ICD-10-CM

## 2022-09-01 LAB
CREAT SERPL-MCNC: 1.17 MG/DL (ref 0.66–1.25)
GFR SERPL CREATININE-BSD FRML MDRD: 70 ML/MIN/1.73M2
HBA1C MFR BLD: 9.2 % (ref 0–5.6)
T4 FREE SERPL-MCNC: 1.35 NG/DL (ref 0.76–1.46)
TSH SERPL DL<=0.005 MIU/L-ACNC: 0.73 MU/L (ref 0.4–4)

## 2022-09-01 PROCEDURE — 83036 HEMOGLOBIN GLYCOSYLATED A1C: CPT

## 2022-09-01 PROCEDURE — 36415 COLL VENOUS BLD VENIPUNCTURE: CPT

## 2022-09-01 PROCEDURE — 84443 ASSAY THYROID STIM HORMONE: CPT

## 2022-09-01 PROCEDURE — 82565 ASSAY OF CREATININE: CPT

## 2022-09-01 PROCEDURE — 84439 ASSAY OF FREE THYROXINE: CPT

## 2022-09-07 ENCOUNTER — TELEPHONE (OUTPATIENT)
Dept: ENDOCRINOLOGY | Facility: CLINIC | Age: 62
End: 2022-09-07

## 2022-09-07 DIAGNOSIS — E03.4 HYPOTHYROIDISM DUE TO ACQUIRED ATROPHY OF THYROID: ICD-10-CM

## 2022-09-07 NOTE — TELEPHONE ENCOUNTER
[facility] Call Center    Phone Message    May a detailed message be left on voicemail: yes     Reason for Call: Other: patient isn't feeling well so rescheduled appointment from today to 1/5/2023.  Patient would really like to get in soone if there is any way we can do that. Thank you.     Action Taken: Other: Endo    Travel Screening: Not Applicable

## 2022-09-08 ENCOUNTER — VIRTUAL VISIT (OUTPATIENT)
Dept: FAMILY MEDICINE | Facility: CLINIC | Age: 62
End: 2022-09-08
Payer: MEDICARE

## 2022-09-08 DIAGNOSIS — U07.1 INFECTION DUE TO 2019 NOVEL CORONAVIRUS: Primary | ICD-10-CM

## 2022-09-08 DIAGNOSIS — Z79.4 TYPE 2 DIABETES MELLITUS WITH DIABETIC NEUROPATHY, WITH LONG-TERM CURRENT USE OF INSULIN (H): ICD-10-CM

## 2022-09-08 DIAGNOSIS — E11.40 TYPE 2 DIABETES MELLITUS WITH DIABETIC NEUROPATHY, WITH LONG-TERM CURRENT USE OF INSULIN (H): ICD-10-CM

## 2022-09-08 PROCEDURE — 99213 OFFICE O/P EST LOW 20 MIN: CPT | Mod: 95 | Performed by: PHYSICIAN ASSISTANT

## 2022-09-08 RX ORDER — LEVOTHYROXINE SODIUM 137 UG/1
137 TABLET ORAL DAILY
Qty: 90 TABLET | Refills: 0 | Status: SHIPPED | OUTPATIENT
Start: 2022-09-08 | End: 2023-02-15

## 2022-09-08 ASSESSMENT — PATIENT HEALTH QUESTIONNAIRE - PHQ9
10. IF YOU CHECKED OFF ANY PROBLEMS, HOW DIFFICULT HAVE THESE PROBLEMS MADE IT FOR YOU TO DO YOUR WORK, TAKE CARE OF THINGS AT HOME, OR GET ALONG WITH OTHER PEOPLE: SOMEWHAT DIFFICULT
SUM OF ALL RESPONSES TO PHQ QUESTIONS 1-9: 10
SUM OF ALL RESPONSES TO PHQ QUESTIONS 1-9: 10

## 2022-09-08 NOTE — PROGRESS NOTES
Caio is a 62 year old who is being evaluated via a billable video visit.      How would you like to obtain your AVS? [employer]  If the video visit is dropped, the invitation should be resent by: Other e-mail: [employer] reviews connect  Will anyone else be joining your video visit? No      Subjective   Caio is a 62 year old, presenting for the following health issues:  No chief complaint on file.      HPI       COVID-19 Symptom Review  How many days ago did these symptoms start? Tuesday    Are any of the following symptoms significant for you?    New or worsening difficulty breathing? No    Worsening cough? Yes, it's a dry cough.     Fever or chills? No    Headache: YES    Sore throat: No    Chest pain: No    Diarrhea: YES    Body aches? YES    What treatments has patient tried? Acetaminophen and Nonsteroidals   Does patient live in a nursing home, group home, or shelter? No  Does patient have a way to get food/medications during quarantined? Yes, I have a friend or family member who can help me.    Patient diabetic with obesity, DAMIÁN, HTN an PVD. Interested in starting treatment at this time.     Review of Systems   Constitutional, HEENT, cardiovascular, pulmonary, GI, , musculoskeletal, neuro, skin, endocrine and psych systems are negative, except as otherwise noted.      Objective           Vitals:  No vitals were obtained today due to virtual visit.    Physical Exam   GENERAL: Healthy, alert and no distress  EYES: Eyes grossly normal to inspection.  No discharge or erythema, or obvious scleral/conjunctival abnormalities.  RESP: No audible wheeze, cough, or visible cyanosis.  No visible retractions or increased work of breathing.    SKIN: Visible skin clear. No significant rash, abnormal pigmentation or lesions.  NEURO: Cranial nerves grossly intact.  Mentation and speech appropriate for age.  PSYCH: Mentation appears normal, affect normal/bright, judgement and insight intact, normal speech and appearance well-groomed.           Assessment & Plan     Infection due to 2019 novel coronavirus  Patient positive for COVID-19 and interested in starting treatment at this time. Most recent renal function was normal. Paxlovid started as below. Appropriate use, side effects and medication interactions reviewed with patient. Encouraged continuation of supportive cares. Discussed symptoms that should prompt immediate care in the ER.   - nirmatrelvir and ritonavir (PAXLOVID) therapy pack; Take 3 tablets by mouth 2 times daily for 5 days (Take 2 Nirmatrelvir tablets and 1 Ritonavir tablet twice daily for 5 days)    Type 2 diabetes mellitus with diabetic neuropathy, with long-term current use of insulin (H)  Encouraged patient to continue monitoring his sugars during this time.     The patient indicates understanding of these issues and agrees with the plan.    Mary Bustillos PA-C  [facility]      Video-Visit Details    Video Start Time: 7:22 AM    Type of service:  Video Visit    Video End Time:7:33 AM    Originating Location (pt. Location): Home    Distant Location (provider location):  [facility]     Platform used for Video Visit: Tessa

## 2022-09-13 ENCOUNTER — TELEPHONE (OUTPATIENT)
Dept: NURSING | Facility: CLINIC | Age: 62
End: 2022-09-13

## 2022-09-13 NOTE — TELEPHONE ENCOUNTER
Pt calling with questions about;    States he finished paxlovid on 9/12/22  Pt is still coughing but states he is significantly better than 6 days ago.  States he does not feel sick anymore and is wondering if he needs to continue to isolate at home until his cough is completely gone.     Pt Denies;  Fever    Advised that if symptoms are significantly better than at onset and fever free for at least 24 hours without fever reducing medications, can stop isolation but must continue to mask for another 5 days.    Pt verbalized understanding and agrees with this plan.    Alexus Zimmerman RN, Nurse Advisor 10:35 PM 9/13/2022  COVID 19 Nurse Triage Plan/Patient Instructions    Please be aware that novel coronavirus (COVID-19) may be circulating in the community. If you develop symptoms such as fever, cough, or SOB or if you have concerns about the presence of another infection including coronavirus (COVID-19), please contact your health care provider or visit [email or web address].     Disposition/Instructions    Home care recommended. Follow home care protocol based instructions.    Thank you for taking steps to prevent the spread of this virus.  o Limit your contact with others.  o Wear a simple mask to cover your cough.  o Wash your hands well and often.    Resources    [facility]: About COVID-19: [email or web address]    CDC: What to Do If You're Sick: www.cdc.gov/coronavirus/2019-ncov/about/steps-when-sick.html    CDC: Ending Home Isolation: www.cdc.gov/coronavirus/2019-ncov/hcp/disposition-in-home-patients.html     CDC: Caring for Someone: www.cdc.gov/coronavirus/2019-ncov/if-you-are-sick/care-for-someone.html     [facility]: Interim Guidance for Hospital Discharge to Home: www.health.[facility].[place]/diseases/coronavirus/hcp/hospdischarge.pdf    [facility] clinical trials (COVID-19 research studies): clinicalaffairs.[facility]/umn-clinical-trials     Below are the COVID-19 hotlines at the  [place] Department of [facility] ([facility]). Interpreters are available.   o For health questions: Call 007-583-6816 or 1-536.593.5301 (7 a.m. to 7 p.m.)  o For questions about schools and childcare: Call 287-059-5080 or 1-194.160.8931 (7 a.m. to 7 p.m.)

## 2022-09-19 ENCOUNTER — TELEPHONE (OUTPATIENT)
Dept: WOUND CARE | Facility: CLINIC | Age: 62
End: 2022-09-19

## 2022-09-19 NOTE — TELEPHONE ENCOUNTER
[facility] W[facility]    Who is the name of the provider?:  Tevin      What is the location you see this provider at?: [place]    Reason for call:  Please call to reschedule tomorrow's, 9/20 10:10, appointment to a different day.    Can we leave a detailed message on this number?  YES

## 2022-09-30 ENCOUNTER — TELEPHONE (OUTPATIENT)
Dept: FAMILY MEDICINE | Facility: CLINIC | Age: 62
End: 2022-09-30

## 2022-09-30 NOTE — TELEPHONE ENCOUNTER
Patient has a cane, states that he would like a walker.    Patient has fallen periodically and knows his balance is getting worse. He denies falling recently.     Advised patient that due to his insurance will need a visit with Lisa Pierre PA-C within the last 30 days for coverage. Schedule for virtual visit on 10/4/22    Yudith GARCIA RN, BSN, PHN  [facility]

## 2022-10-04 ENCOUNTER — VIRTUAL VISIT (OUTPATIENT)
Dept: FAMILY MEDICINE | Facility: CLINIC | Age: 62
End: 2022-10-04
Payer: MEDICARE

## 2022-10-04 DIAGNOSIS — F33.1 MODERATE EPISODE OF RECURRENT MAJOR DEPRESSIVE DISORDER (H): ICD-10-CM

## 2022-10-04 DIAGNOSIS — G60.9 IDIOPATHIC PERIPHERAL NEUROPATHY: ICD-10-CM

## 2022-10-04 DIAGNOSIS — R45.4 EXCESSIVE ANGER: ICD-10-CM

## 2022-10-04 DIAGNOSIS — Z79.4 TYPE 2 DIABETES MELLITUS WITH DIABETIC NEUROPATHY, WITH LONG-TERM CURRENT USE OF INSULIN (H): Primary | ICD-10-CM

## 2022-10-04 DIAGNOSIS — R26.81 UNSTEADY GAIT: ICD-10-CM

## 2022-10-04 DIAGNOSIS — E11.42 DIABETIC POLYNEUROPATHY ASSOCIATED WITH TYPE 2 DIABETES MELLITUS (H): ICD-10-CM

## 2022-10-04 DIAGNOSIS — Z13.220 SCREENING FOR HYPERLIPIDEMIA: ICD-10-CM

## 2022-10-04 DIAGNOSIS — I73.9 PVD (PERIPHERAL VASCULAR DISEASE) (H): ICD-10-CM

## 2022-10-04 DIAGNOSIS — E11.40 TYPE 2 DIABETES MELLITUS WITH DIABETIC NEUROPATHY, WITH LONG-TERM CURRENT USE OF INSULIN (H): Primary | ICD-10-CM

## 2022-10-04 PROCEDURE — 99214 OFFICE O/P EST MOD 30 MIN: CPT | Mod: 95 | Performed by: PHYSICIAN ASSISTANT

## 2022-10-04 RX ORDER — BUPROPION HYDROCHLORIDE 300 MG/1
300 TABLET ORAL EVERY MORNING
Qty: 90 TABLET | Refills: 1 | Status: SHIPPED | OUTPATIENT
Start: 2022-10-04 | End: 2022-10-19

## 2022-10-04 RX ORDER — BUPROPION HYDROCHLORIDE 150 MG/1
150 TABLET ORAL EVERY MORNING
Qty: 90 TABLET | Refills: 1 | Status: SHIPPED | OUTPATIENT
Start: 2022-10-04 | End: 2022-10-19

## 2022-10-04 NOTE — PROGRESS NOTES
Caio is a 62 year old who is being evaluated via a billable video visit.      How would you like to obtain your AVS? MyChart  If the video visit is dropped, the invitation should be resent by: Text to cell phone: [phone]  Will anyone else be joining your video visit? No        Assessment & Plan     Type 2 diabetes mellitus with diabetic neuropathy, with long-term current use of insulin (H)  Continue follow-up with Endo    - ALT; Future  - BASIC METABOLIC PANEL; Future    Diabetic polyneuropathy associated with type 2 diabetes mellitus (H)  Needs support of walker. Has been using cane, but this does not offer enough stability.   Feels gait is worsening. Patient would benefit greatly for walker-. Patient having difficulty with walking/ambulation around home. He has difficulty toileting as well. Walker would help with raising off toilet and out of chair and allow him to ambulate at home safely. He has been able to safely use cane and will likely be even safer with walker.   - Physical Therapy Referral; Future  - Walker Order for DME - ONLY FOR DME    Moderate episode of recurrent major depressive disorder (H)  Still having mood lability  Will increase dose of wellbutrin and continue citalopram 40 mg daily  Open to therapy. Likes the [facility] idea to start.   - buPROPion (WELLBUTRIN XL) 300 MG 24 hr tablet; Take 1 tablet (300 mg) by mouth every morning Take along with 150 mg tablet for total of 450 mg daily  - buPROPion (WELLBUTRIN XL) 150 MG 24 hr tablet; Take 1 tablet (150 mg) by mouth every morning Take along with 300 mg tablet for total of 450 mg daily    PVD (peripheral vascular disease) (H)  H/o, gait becoming increasingly unsteady  Needs support of walker. Has been using cane, but this does not offer enough stability.   Feels gait is worsening. Patient would benefit greatly for walker-. Patient having difficulty with walking/ambulation around home. He has difficulty toileting as well. Walker would help with  raising off toilet and out of chair and allow him to ambulate at home safely. He has been able to safely use cane and will likely be even safer with walker.   - Physical Therapy Referral; Future    Excessive anger  See #3, feels has 'short fuse'  - buPROPion (WELLBUTRIN XL) 300 MG 24 hr tablet; Take 1 tablet (300 mg) by mouth every morning Take along with 150 mg tablet for total of 450 mg daily  - buPROPion (WELLBUTRIN XL) 150 MG 24 hr tablet; Take 1 tablet (150 mg) by mouth every morning Take along with 300 mg tablet for total of 450 mg daily    Unsteady gait  Will fax order to [facility] in [place]  Therapy for ambulation as well.   - Physical Therapy Referral; Future  - Walker Order for DME - ONLY FOR DME    Idiopathic peripheral neuropathy  See above  Affecting gait- Physical Therapy Referral; Future  - Walker Order for DME - ONLY FOR DME    Needs support of walker. Has been using cane, but this does not offer enough stability.   Feels gait is worsening. Patient would benefit greatly for walker-. Patient having difficulty with walking/ambulation around home. He has difficulty toileting as well. Walking would help with raising off toilet and out of chair and allow him to ambulate at home safely. He has been able to safely use cane and will likely be even safer with walker. Without support of walker, he is at risk for increased falls and injury.    Screening for hyperlipidemia  Due   - Lipid panel reflex to direct LDL Non-fasting; Future    No follow-ups on file.    ANASTASIA [facility] [place]    Subjective   Caio is a 62 year old, presenting for the following health issues:  NEUROPATHY      History of Present Illness       Reason for visit:  Neuropathy    He eats 0-1 servings of fruits and vegetables daily.He consumes 0 sweetened beverage(s) daily.He exercises with enough effort to increase his heart rate 9 or less minutes per day.  He exercises with enough effort to increase  "his heart rate 3 or less days per week. He is missing 2 dose(s) of medications per week.  He is not taking prescribed medications regularly due to remembering to take.       Gait becoming more unsteady due to neuropathy  Using cane now.       Review of Systems   Constitutional, HEENT, cardiovascular, pulmonary, gi and gu systems are negative, except as otherwise noted.      Objective    Vitals - Patient Reported  Weight (Patient Reported): 113.4 kg (250 lb)  Height (Patient Reported): 165.1 cm (5' 5\")  BMI (Based on Pt Reported Ht/Wt): 41.6      Vitals:  No vitals were obtained today due to virtual visit.    Physical Exam   GENERAL: Healthy, alert and no distress  EYES: Eyes grossly normal to inspection.  No discharge or erythema, or obvious scleral/conjunctival abnormalities.  RESP: No audible wheeze, cough, or visible cyanosis.  No visible retractions or increased work of breathing.    SKIN: Visible skin clear. No significant rash, abnormal pigmentation or lesions.  NEURO: Cranial nerves grossly intact.  Mentation and speech appropriate for age.  PSYCH: Mentation appears normal, affect normal/bright, judgement and insight intact, normal speech and appearance well-groomed.                Video-Visit Details    Video Start Time: 1:34 PM    Type of service:  Video Visit    Video End Time:2:01 PM    Originating Location (pt. Location): Home    Distant Location (provider location):  [facility] [place]     Platform used for Video Visit: Tessa"

## 2022-10-05 ENCOUNTER — TELEPHONE (OUTPATIENT)
Dept: FAMILY MEDICINE | Facility: CLINIC | Age: 62
End: 2022-10-05

## 2022-10-05 NOTE — TELEPHONE ENCOUNTER
Please contact [facility] in [place] and confirm fax number where to fax order for walker.  In my outbox.       When faxed contact pt and let him know.    Lisa Pierre PA-C

## 2022-10-05 NOTE — TELEPHONE ENCOUNTER
EMEKA faxed to [facility], 10/05/2022. Called pt-lm to inform order faxed.    Tianna Cacrees/ANUJ

## 2022-10-07 DIAGNOSIS — K21.00 GASTROESOPHAGEAL REFLUX DISEASE WITH ESOPHAGITIS WITHOUT HEMORRHAGE: ICD-10-CM

## 2022-10-07 DIAGNOSIS — R39.11 BENIGN PROSTATIC HYPERPLASIA WITH URINARY HESITANCY: ICD-10-CM

## 2022-10-07 DIAGNOSIS — N40.1 BENIGN PROSTATIC HYPERPLASIA WITH URINARY HESITANCY: ICD-10-CM

## 2022-10-07 DIAGNOSIS — E78.5 HYPERLIPIDEMIA LDL GOAL <100: ICD-10-CM

## 2022-10-07 DIAGNOSIS — E03.4 HYPOTHYROIDISM DUE TO ACQUIRED ATROPHY OF THYROID: ICD-10-CM

## 2022-10-07 RX ORDER — LEVOTHYROXINE SODIUM 137 UG/1
137 TABLET ORAL DAILY
Qty: 90 TABLET | Refills: 0 | OUTPATIENT
Start: 2022-10-07

## 2022-10-10 DIAGNOSIS — G47.33 OSA ON CPAP: Primary | ICD-10-CM

## 2022-10-10 RX ORDER — FINASTERIDE 5 MG/1
TABLET, FILM COATED ORAL
Qty: 90 TABLET | Refills: 0 | Status: SHIPPED | OUTPATIENT
Start: 2022-10-10 | End: 2022-12-20

## 2022-10-10 RX ORDER — FAMOTIDINE 40 MG/1
TABLET, FILM COATED ORAL
Qty: 90 TABLET | Refills: 0 | Status: SHIPPED | OUTPATIENT
Start: 2022-10-10 | End: 2022-12-20

## 2022-10-10 NOTE — TELEPHONE ENCOUNTER
Routing refill request to provider for review/approval because:  Labs not current:  LDL  Lab orders/lab now? Defer to next visit, confirm  LDL Cholesterol Calculated   Date Value Ref Range Status   07/30/2021 98 <=100 mg/dL Final     Comment:     Age 0-19 years:  Desirable: 0-110 mg/dL   Borderline high: 110-129 mg/dL   High: >= 130 mg/dL    Age 20 years and older:  Desirable: <100mg/dL  Above desirable: 100-129 mg/dL   Borderline high: 130-159 mg/dL   High: 160-189 mg/dL   Very high: >= 190 mg/dL   03/05/2020 88 <100 mg/dL Final     Comment:     Desirable:       <100 mg/dl     Lucía Bond RN, BSN  [facility]

## 2022-10-10 NOTE — TELEPHONE ENCOUNTER
Prescription approved per [facility] Refill Protocol.  Lucía Bond RN, BSN  [facility]

## 2022-10-11 RX ORDER — SIMVASTATIN 20 MG
TABLET ORAL
Qty: 90 TABLET | Refills: 0 | Status: SHIPPED | OUTPATIENT
Start: 2022-10-11 | End: 2022-12-20

## 2022-10-12 NOTE — TELEPHONE ENCOUNTER
Spoke to Caio and informed order was faxed to number below. Advised patient to call back if he would like it faxed to another number.    Yudith GARCIA RN, BSN, PHN - PAL (patient advocate liaison)  [facility]  [phone]

## 2022-10-12 NOTE — TELEPHONE ENCOUNTER
Patient calls to report [facility] did not receive DME for walker.   Re-faxed order to fax# [phone]

## 2022-10-13 ENCOUNTER — PATIENT OUTREACH (OUTPATIENT)
Dept: FAMILY MEDICINE | Facility: CLINIC | Age: 62
End: 2022-10-13

## 2022-10-13 NOTE — TELEPHONE ENCOUNTER
Yudith YEPEZ PAL   Please let patient know this was completed on 10/14/2022   RN PAL received VM from patient     [facility] is asking for order for 4 wheeled walker and F2F to be faxed     RN faxed F2F, order, demographics to [facility] [phone]    Marcela Blackburn, Registered Nurse, PAL (Patient Advocate Liason)   [facility]   [phone]

## 2022-10-14 NOTE — TELEPHONE ENCOUNTER
Left detailed message informing patient order was faxed and to call back with any questions or concerns.     Yudith GARCIA RN, BSN, PHN - PAL (patient advocate liaison)  [facility]  [phone]

## 2022-10-15 ENCOUNTER — IMMUNIZATION (OUTPATIENT)
Dept: FAMILY MEDICINE | Facility: CLINIC | Age: 62
End: 2022-10-15
Payer: MEDICARE

## 2022-10-15 DIAGNOSIS — Z23 HIGH PRIORITY FOR 2019-NCOV VACCINE: ICD-10-CM

## 2022-10-15 DIAGNOSIS — Z23 NEED FOR PROPHYLACTIC VACCINATION AND INOCULATION AGAINST INFLUENZA: ICD-10-CM

## 2022-10-15 PROCEDURE — 91312 COVID-19,PF,PFIZER BOOSTER BIVALENT: CPT

## 2022-10-15 PROCEDURE — 90682 RIV4 VACC RECOMBINANT DNA IM: CPT

## 2022-10-15 PROCEDURE — 0124A COVID-19,PF,PFIZER BOOSTER BIVALENT: CPT

## 2022-10-15 PROCEDURE — G0008 ADMIN INFLUENZA VIRUS VAC: HCPCS

## 2022-10-17 ENCOUNTER — VIRTUAL VISIT (OUTPATIENT)
Dept: BEHAVIORAL HEALTH | Facility: CLINIC | Age: 62
End: 2022-10-17
Payer: MEDICARE

## 2022-10-17 DIAGNOSIS — F33.1 MODERATE EPISODE OF RECURRENT MAJOR DEPRESSIVE DISORDER (H): Primary | ICD-10-CM

## 2022-10-17 PROCEDURE — 90837 PSYTX W PT 60 MINUTES: CPT | Mod: 95 | Performed by: SOCIAL WORKER

## 2022-10-17 ASSESSMENT — ANXIETY QUESTIONNAIRES
GAD7 TOTAL SCORE: 6
5. BEING SO RESTLESS THAT IT IS HARD TO SIT STILL: NOT AT ALL
7. FEELING AFRAID AS IF SOMETHING AWFUL MIGHT HAPPEN: NOT AT ALL
IF YOU CHECKED OFF ANY PROBLEMS ON THIS QUESTIONNAIRE, HOW DIFFICULT HAVE THESE PROBLEMS MADE IT FOR YOU TO DO YOUR WORK, TAKE CARE OF THINGS AT HOME, OR GET ALONG WITH OTHER PEOPLE: SOMEWHAT DIFFICULT
1. FEELING NERVOUS, ANXIOUS, OR ON EDGE: NOT AT ALL
2. NOT BEING ABLE TO STOP OR CONTROL WORRYING: SEVERAL DAYS
GAD7 TOTAL SCORE: 6
3. WORRYING TOO MUCH ABOUT DIFFERENT THINGS: SEVERAL DAYS
6. BECOMING EASILY ANNOYED OR IRRITABLE: SEVERAL DAYS

## 2022-10-17 ASSESSMENT — COLUMBIA-SUICIDE SEVERITY RATING SCALE - C-SSRS
TOTAL  NUMBER OF ABORTED OR SELF INTERRUPTED ATTEMPTS LIFETIME: NO
TOTAL  NUMBER OF INTERRUPTED ATTEMPTS LIFETIME: NO
1. HAVE YOU WISHED YOU WERE DEAD OR WISHED YOU COULD GO TO SLEEP AND NOT WAKE UP?: NO
2. HAVE YOU ACTUALLY HAD ANY THOUGHTS OF KILLING YOURSELF?: NO
ATTEMPT LIFETIME: NO
6. HAVE YOU EVER DONE ANYTHING, STARTED TO DO ANYTHING, OR PREPARED TO DO ANYTHING TO END YOUR LIFE?: NO

## 2022-10-17 ASSESSMENT — PATIENT HEALTH QUESTIONNAIRE - PHQ9
5. POOR APPETITE OR OVEREATING: NEARLY EVERY DAY
SUM OF ALL RESPONSES TO PHQ QUESTIONS 1-9: 16

## 2022-10-17 NOTE — PROGRESS NOTES
[facility] Primary Care: Integrated Behavioral Health  2022    Behavioral Health Clinician Progress Note    Patient Name: Steve Morgan           Service Type:  Individual      [facility] Email (patient reached)     Session Start Time: 10:00 a.m.  Session End Time: 11:00 a.m.      Session Length: 53 - 60      Attendees: Client     Service Modality:  Video Visit:      Provider verified identity through the following two step process.  Patient provided:  Patient photo and Patient     Telemedicine Visit: The patient's condition can be safely assessed and treated via synchronous audio and visual telemedicine encounter.      Reason for Telemedicine Visit: Patient has requested telehealth visit    Originating Site (Patient Location): Patient's home    Distant Site (Provider Location): [facility] MENTAL HEALTH & ADDICTION [facility]    Consent:  The patient/guardian has verbally consented to: the potential risks and benefits of telemedicine (video visit) versus in person care; bill my insurance or make self-payment for services provided; and responsibility for payment of non-covered services.     Patient would like the video invitation sent by:  My Chart    Mode of Communication:  Video Conference via [facility]    Distant Location (Provider):  On-site    As the provider I attest to compliance with applicable laws and regulations related to telemedicine.    Visit Activities (Refresh list every visit): [facility] Only    Diagnostic Assessment Date:   Treatment Plan Review Date:   See Flowsheets for today's PHQ-9 and JOSE MANUEL-7 results  Previous PHQ-9:   PHQ-9 SCORE 2022 2022 10/17/2022   PHQ-9 Total Score - - -   PHQ-9 Total Score MyChart 6 (Mild depression) 10 (Moderate depression) -   PHQ-9 Total Score 6 10 16     Previous JOSE MANUEL-7:   JOSE MANUEL-7 SCORE 2021 2021 10/17/2022   Total Score - - -   Total Score 4 (minimal anxiety) 3 (minimal  "anxiety) -   Total Score 4 3 6       SHANNON LEVEL:  SHANNON Score (Last Two) 3/30/2017 1/15/2020   SHANNON Raw Score 29 36   Activation Score 52.9 75.5   SHANNON Level 2 4       DATA  Extended Session (60+ minutes): No  Interactive Complexity: No  Crisis: No  [facility] Patient: No    Treatment Objective(s) Addressed in This Session:  Target Behavior(s): disease management/lifestyle changes depression    n/a- initial visit    Current Stressors / Issues:  Pt presents as a referral from PCP who he saw a couple of weeks ago due to an increase in tearfulness.  Pt reports that when he watches a \"touching movie, especially when someone dies, I am a basket case.\"  Pt's daughter is getting  in a year and pt is concerned he will cry uncontrollably throughout the wedding.  Asked how long this has been going on, pt states he remembers feeling like he lost control and cried through his daughter's college graduation ceremony about 8 years ago but that he believes he's been experiencing this for maybe 10-12 years.  Pt reports his PCP told him this is common in aging men. Pt endorses experiencing difficulty with initial insomnia secondary to napping during the day.  He naps when he has to get up early and take his wife to the bus stop for work. On days that he doesn't have to take her, he reportedly gets about 10 hours of sleep per night, after which he feels \"drained.\"  Pt is having difficulty with word recall.  He also endorses eating poorly even though he has diabetes, and feeling low mood. Pt has been experiencing an increase in irritability and reports his mood can change \"just like that.\" He notices that after he visits his parents (\"my dad is grouchy, to say the least\") he is more irritable, then displacing his anger toward his wife.  He has been much more sedentary because he lacks motivation to do much; reportedly has no hobbies and doesn't really have any friends.  His family are the people he has relationships.  Pt hasn't really been " "helping out with household chores due to low motivation, as well.  \"I'm a great procrastinator.\" Pt is on disability due to hand neuropathy so is home most of the time.      Pt reports PCP increased his Wellbutrin dosage from 150 mgs to (what pt believed to be) 300 mgs.  However, he had to pay $141 for the 300 mg dosage, so he has only been taking the 150 mgs due to cost.      Pt saw a therapist at [facility] in 2020; there he reports working on getting along better with his wife and also procrastination.  He found it mildly helpful.  For this course of tx, pt would like to work on some of the same issues- \"why I don't get along with certain individuals, procrastination... I don't know what to do about it, but I don't get along with my dad a lot; he's yelling at mom all the time.\"      Progress on Treatment Objective(s) / Homework:  Worsening - CONTEMPLATION (Considering change and yet undecided); Intervened by assessing the negative and positive thinking (ambivalence) about behavior change    Also provided psychoeducation about behavioral health condition, symptoms, and treatment options    Care Plan review completed: No    Medication Review:  Changes to psychiatric medications, see updated Medication List in EPIC.     Medication Compliance:  No Pt reports he is unaware that he is supposed to be taking 450 mgs total daily.  Pt has only been taking 150 mgs of Bupropion due to the cost of the medication.  He is taking his Celexa as prescribed.      Changes in Health Issues:   Yes: Diabetes, uncontrolled.  Neuropathy in hands and feet increasing    Chemical Use Review:   Substance Use: Chemical use reviewed, no active concerns identified  Pt uses almost no alcohol      Tobacco Use: No current tobacco use.      Assessment: Current Emotional / Mental Status (status of significant symptoms):  Risk status (Self / Other harm or suicidal ideation)  Patient denies a history of suicidal ideation, suicide attempts, self-injurious " behavior, homicidal ideation, homicidal behavior and and other safety concerns  Patient denies current fears or concerns for personal safety.  Patient denies current or recent suicidal ideation or behaviors.  Patient denies current or recent homicidal ideation or behaviors.  Patient denies current or recent self injurious behavior or ideation.  Patient denies other safety concerns.  A safety and risk management plan has not been developed at this time, however patient was encouraged to call [place] [id number] should there be a change in any of these risk factors.    Appearance:   Appropriate   Eye Contact:   Good   Psychomotor Behavior: Normal   Attitude:   Cooperative  Friendly  Orientation:   All  Speech   Rate / Production: Normal/ Responsive   Volume:  Normal   Mood:    Anxious   Affect:    Blunted   Thought Content:  Clear   Thought Form:  Coherent  Logical   Insight:    Fair     Diagnoses:  1. Moderate episode of recurrent major depressive disorder (H)    by history    Collateral Reports Completed:  Routed note to PCP and former [facility] therapist, Veronica.    Plan: (Homework, other):  Patient was given information about behavioral services and encouraged to schedule a follow up appointment with the clinic [facility] in 1 week to complete an update to his diagnostic assessment.   Writer will reach out to former therapist, as pt would like to return to her.  If she cannot see him, he'd like to remain in the [facility] system for therapy.   Writer will route note to PCP regarding medication cost.    He was also given information about mental health symptoms and treatment options .    CD Recommendations: No indications of CD issues.    RIZWAN Valdez, [facility]  Behavioral Health Clinician

## 2022-10-17 NOTE — Clinical Note
Lisa Christianson. Saw this pt for first visit.  He would like to return to his previous therapist at [facility] so I'll bridge.   He filled his Wellbutrin and the copay was $141 (compared to maybe $12 previously) so he has only been taking 150 mgs total.  He was unaware that he was supposed to be taking 450 mgs total (thought he was to take 300 mgs total).  Unless I misread the script instructions..  ?   At any rate, he's only taking 150 mgs.  He is definitely depressed.     Tonya

## 2022-10-18 ENCOUNTER — TELEPHONE (OUTPATIENT)
Dept: FAMILY MEDICINE | Facility: CLINIC | Age: 62
End: 2022-10-18

## 2022-10-18 ENCOUNTER — VIRTUAL VISIT (OUTPATIENT)
Dept: SLEEP MEDICINE | Facility: CLINIC | Age: 62
End: 2022-10-18
Payer: MEDICARE

## 2022-10-18 DIAGNOSIS — F33.0 MILD EPISODE OF RECURRENT MAJOR DEPRESSIVE DISORDER (H): ICD-10-CM

## 2022-10-18 DIAGNOSIS — G47.8 UPPER AIRWAY RESISTANCE SYNDROME: Primary | ICD-10-CM

## 2022-10-18 DIAGNOSIS — F33.1 MODERATE EPISODE OF RECURRENT MAJOR DEPRESSIVE DISORDER (H): ICD-10-CM

## 2022-10-18 DIAGNOSIS — R45.4 EXCESSIVE ANGER: ICD-10-CM

## 2022-10-18 DIAGNOSIS — G47.10 EXCESSIVE SLEEPINESS: ICD-10-CM

## 2022-10-18 PROCEDURE — 99215 OFFICE O/P EST HI 40 MIN: CPT | Mod: 95 | Performed by: PHYSICIAN ASSISTANT

## 2022-10-18 ASSESSMENT — SLEEP AND FATIGUE QUESTIONNAIRES
HOW LIKELY ARE YOU TO NOD OFF OR FALL ASLEEP IN A CAR, WHILE STOPPED FOR A FEW MINUTES IN TRAFFIC: WOULD NEVER DOZE
HOW LIKELY ARE YOU TO NOD OFF OR FALL ASLEEP WHILE WATCHING TV: HIGH CHANCE OF DOZING
HOW LIKELY ARE YOU TO NOD OFF OR FALL ASLEEP WHILE SITTING INACTIVE IN A PUBLIC PLACE: HIGH CHANCE OF DOZING
HOW LIKELY ARE YOU TO NOD OFF OR FALL ASLEEP WHILE LYING DOWN TO REST IN THE AFTERNOON WHEN CIRCUMSTANCES PERMIT: MODERATE CHANCE OF DOZING
HOW LIKELY ARE YOU TO NOD OFF OR FALL ASLEEP WHILE SITTING AND READING: SLIGHT CHANCE OF DOZING
HOW LIKELY ARE YOU TO NOD OFF OR FALL ASLEEP WHILE SITTING QUIETLY AFTER LUNCH WITHOUT ALCOHOL: MODERATE CHANCE OF DOZING
HOW LIKELY ARE YOU TO NOD OFF OR FALL ASLEEP WHILE SITTING AND TALKING TO SOMEONE: WOULD NEVER DOZE
HOW LIKELY ARE YOU TO NOD OFF OR FALL ASLEEP WHEN YOU ARE A PASSENGER IN A CAR FOR AN HOUR WITHOUT A BREAK: HIGH CHANCE OF DOZING

## 2022-10-18 NOTE — TELEPHONE ENCOUNTER
Fax received from [facility] requesting more documentation supporting the need of the walker.  This will be placed in CJ in box.    Patt Rodríguez

## 2022-10-18 NOTE — TELEPHONE ENCOUNTER
See [facility] note below.     Please call pharmacy and see what was previously dispenses (dose) and date.    Also, as pharmacy why this round was so expensive?    Please route back.    Lisa Pierre PA-C

## 2022-10-18 NOTE — PROGRESS NOTES
Caio is a 62 year old who is being evaluated via a billable video visit.      How would you like to obtain your AVS? MyChart  If the video visit is dropped, the invitation should be resent by: Send to e-mail at: [email or web address]  Will anyone else be joining your video visit? Jovana Whyte    Video-Visit Details    Video Start Time: 2:36PM    Type of service:  Video Visit    Video End Time:2:59PM    Originating Location (pt. Location): Home        Distant Location (provider location):  Off-site    Platform used for Video Visit: [facility]   Outpatient Sleep Medicine  Oct 18, 2022       Name: Steve Morgan MRN# [id number]   Age: 62 year old YOB: 1960            Assessment and Plan:   1. Upper airway resistance syndrome  2. Excessive sleepiness  3. Mild episode of recurrent major depressive disorder (H)  Patient's sleep disordered breathing is adequately managed and well treated with current PAP settings [id number]. Compliance is excellent.  Tolerating PAP well.  Patient reports his machine continues to blow air even after it has been shut off, can be 30 minutes later and still blows air.  Unclear what is going on with his machine, he will bring to [facility] beds at the [facility] if this continues for troubleshooting.  Patient believes his increased daytime sleepiness is attributable to sedentary lifestyle.  Download shows good treatment of sleep disordered breathing so agree I do not think it is breathing related residual sleepiness.  Discussed we could consider repeat sleep study in the future if continues but chart review also shows uncontrolled depression currently which I assume is playing a role.  Encouraged him to try to increase activity and exercise to help with alertness during the day.  He will follow-up with PCP for depression management.  Follow-up with me in about 1 year for annual visit or of course sooner as needed.       Chief Complaint      Chief Complaint   Patient  "presents with     Video Visit     Follow Up           History of Present Illness:     Steve Morgan is a 62 year old male who presents to the clinic for follow-up of their severe upper airway resistance syndrome/history of obstructive sleep apnea. Other significant past medical history significant for HTN, HLD, DM with peripheral neuropathy, hypothyroidism, PAD, BPH, obesity, and depression.     Originally diagnosed with DAMIÁN 10/3/2002 through [facility] ([phone]#) with split night study revealing AHI 31/hr, RDI 46/hr, oxygen johnnie 83%; CPAP 8cm identified as optimal setting and started CPAP at that time.     More recently, he had a split night PSG completed on 1/18/2010 through [facility] revealing predominantly respiratory effort related arousals and severe upper airway resistance syndrome with an AHI 1.6 and RDI 33.8; CPAP 8cmH2O again adequately treated sleep disordered breathing events.     He was treated with CPAP 8cmH2O for a number of years until switched to auto- titrating PAP [id number] in 12/2014 due to residular snoring and weight gain (pt was 104kg at time of study, 107kg in 12/2014). Replacement machine through [facility] on 8/14/2020.      Returns to clinic today for routine follow-up. Last visit 8/2021. Only concern/complaint today is \"I have noticed when I turn off my machine in the morning air still comes escaping out of it and I can hear it for a good 20-30 minutes even with the machine off\". Unsure why still blowing air, will bring to [facility] for troubleshooting if continues.     Patient is using a nasal mask. The mask is comfortable. The mask is not leaking. They are not snoring with the mask on. They are not having gasp arousals.  They are not having significant oral/nasal dryness or epistaxis.  They are not having pain/skin breakdown. The pressure settings are comfortable.     Bedtime is typically 10-11:00PM. Usually it takes about ~30 minutes to fall asleep with the mask on. Wake time is typically " "between 6-9:30AM pending the day, brings his wife to bus stop early in AM couple days a week. Wakes 3 times per night for the restroom. Patient is using PAP therapy 9 hours per night.     ESS is elevated today.  Patient does not believe his increased sleepiness is due to his sleep or any issues with CPAP.  Said \"probably just because I am not active much during the day so it is really nothing for me to fall asleep while I am watching TV\".  Admits over the past 2-3 years has become more sedentary.  Chart review shows current uncontrolled depression also likely playing a role.    ResMed Auto-PAP 7.0 - 15.0 cmH2O 30 day usage data:    100% of days with > 4 hours of use. 0/30 days with no use.   Average use 551 minutes per day.   95%ile Leak 21.37 L/min.   CPAP 95% pressure 12.7 cm.   AHI 2.35 events per hour.     SCALES:   INSOMNIA: Insomnia Severity Score: 4   SLEEPINESS: [place] Sleepiness Score: 14    Past medical/surgical history, family history, social history, medications and allergies were reviewed.           Physical Examination:   There were no vitals taken for this visit.  General appearance: Awake, alert, cooperative. Well groomed. Sitting comfortably in chair. In no apparent distress.  HEENT: Head: Normocephalic, atraumatic. Eyes:Conjunctiva clear. Sclera normal. Nose: External appearance without deformity.   Pulmonary:  Able to speak easily in full sentences. No cough or wheeze.   Skin:  No rashes or significant lesions on visible skin.   Neurologic: Alert, oriented x3.   Psychiatric: Mood euthymic. Affect congruent with full range and intensity.      CC:  Lisa Pierre PA-C  Oct 18, 2022     [facility]  [address] , [place] [address]     [facility]  [address] [address]    Chart documentation was completed, in part, with io[employer] voice-recognition software. Even though reviewed, some " grammatical, spelling, and word errors may remain.      41 minutes spent on day of encounter doing chart review, history and exam, documentation, and further activities as noted above

## 2022-10-18 NOTE — TELEPHONE ENCOUNTER
Tonya Dickinson, Lisa Camp, Lisa Xiong.   Saw this pt for first visit.  He would like to return to his previous therapist at [facility] so I'll bridge.     He filled his Wellbutrin and the copay was $141 (compared to maybe $12 previously) so he has only been taking 150 mgs total.  He was unaware that he was supposed to be taking 450 mgs total (thought he was to take 300 mgs total).  Unless I misread the script instructions..  ?   At any rate, he's only taking 150 mgs.  He is definitely depressed.       Tonya

## 2022-10-18 NOTE — TELEPHONE ENCOUNTER
Lisa Pierre PA-C-    Patient picked up Wellbutrin 300 mg on 10/7, patient did not  Wellbutrin 150mg, pharmacy staff believes this may be due to cost of $141.     Pharmacy staff Informed that patient has never picked up previous prescription of Wellbutrin 300mg- rx on file 21 until it . 2020 rx never picked up.     Pharmacy informs of Medicare donut hole.This occurs when medicare patients max out what is owed to medicare. When this occurs patients copays increase which is why the prescription cost increased.     Yudith GARCIA RN, BSN, PHN  [facility]

## 2022-10-19 RX ORDER — BUPROPION HYDROCHLORIDE 300 MG/1
300 TABLET ORAL EVERY MORNING
Qty: 90 TABLET | Refills: 1 | Status: ON HOLD
Start: 2022-10-19 | End: 2022-12-23

## 2022-10-19 NOTE — TELEPHONE ENCOUNTER
Yes OV notes needed and needs to document the following    1. The beneficiary has a mobility limitation that significantly impairs their ability to participate in one or more MRADL's (mobility related activities of daily living).   2. The beneficiary can safely use the walker.   3. The functional mobility deficit can be sufficiency resolved with the use of a walker.    Would you like us to send the note as is or do you want to addend it?

## 2022-10-19 NOTE — TELEPHONE ENCOUNTER
Tonya,     See note below. I don't think Caio was taking the Wellbutrin at all. It sounds like he's just started the 300 mg now. So, hopefully this should help.    ~Lisa

## 2022-10-19 NOTE — TELEPHONE ENCOUNTER
Notes from 10/4/22 addendum done.     Can RN review form?    if needs signature please have david sign this in my absence or Dr. Fowler.

## 2022-10-19 NOTE — TELEPHONE ENCOUNTER
RN reviewed form, there is no signature needed.     RN faxed OV from 10/4/22 to [facility] (Fax# [phone]) and advised to call this RN PAL with any questions, direct number provided.     Informed patient that form was completed and faxed to [facility]. Advised patient to return call to this RN PAL if anything additional is needed.     Yudith GARCIA RN, BSN, PHN - PAL (patient advocate liaison)  [facility]  [phone]

## 2022-10-19 NOTE — TELEPHONE ENCOUNTER
Stefany,    Would you mind looking at this form and see what they need?  Is it just OV notes?    Thanks!

## 2022-10-27 ENCOUNTER — VIRTUAL VISIT (OUTPATIENT)
Dept: BEHAVIORAL HEALTH | Facility: CLINIC | Age: 62
End: 2022-10-27
Payer: MEDICARE

## 2022-10-27 DIAGNOSIS — F33.0 MILD EPISODE OF RECURRENT MAJOR DEPRESSIVE DISORDER (H): Primary | ICD-10-CM

## 2022-10-27 PROCEDURE — 90791 PSYCH DIAGNOSTIC EVALUATION: CPT | Mod: 52 | Performed by: SOCIAL WORKER
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
## 2022-11-22 DIAGNOSIS — E03.4 HYPOTHYROIDISM DUE TO ACQUIRED ATROPHY OF THYROID: Primary | ICD-10-CM

## 2022-11-22 DIAGNOSIS — E11.40 TYPE 2 DIABETES MELLITUS WITH DIABETIC NEUROPATHY, WITH LONG-TERM CURRENT USE OF INSULIN (H): ICD-10-CM

## 2022-11-22 DIAGNOSIS — Z79.4 TYPE 2 DIABETES MELLITUS WITH DIABETIC NEUROPATHY, WITH LONG-TERM CURRENT USE OF INSULIN (H): ICD-10-CM

## 2022-12-03 DIAGNOSIS — F33.1 MODERATE EPISODE OF RECURRENT MAJOR DEPRESSIVE DISORDER (H): ICD-10-CM

## 2022-12-05 RX ORDER — CITALOPRAM HYDROBROMIDE 40 MG/1
TABLET ORAL
Qty: 90 TABLET | Refills: 1 | Status: ON HOLD | OUTPATIENT
Start: 2022-12-05 | End: 2023-01-08

## 2022-12-06 ASSESSMENT — PATIENT HEALTH QUESTIONNAIRE - PHQ9
SUM OF ALL RESPONSES TO PHQ QUESTIONS 1-9: 9
10. IF YOU CHECKED OFF ANY PROBLEMS, HOW DIFFICULT HAVE THESE PROBLEMS MADE IT FOR YOU TO DO YOUR WORK, TAKE CARE OF THINGS AT HOME, OR GET ALONG WITH OTHER PEOPLE: SOMEWHAT DIFFICULT
SUM OF ALL RESPONSES TO PHQ QUESTIONS 1-9: 9

## 2022-12-07 ENCOUNTER — VIRTUAL VISIT (OUTPATIENT)
Dept: PSYCHOLOGY | Facility: CLINIC | Age: 62
End: 2022-12-07
Payer: MEDICARE

## 2022-12-07 DIAGNOSIS — F33.41 RECURRENT MAJOR DEPRESSIVE DISORDER, IN PARTIAL REMISSION (H): Primary | ICD-10-CM

## 2022-12-07 PROCEDURE — 90791 PSYCH DIAGNOSTIC EVALUATION: CPT | Mod: 95 | Performed by: SOCIAL WORKER

## 2022-12-07 ASSESSMENT — COLUMBIA-SUICIDE SEVERITY RATING SCALE - C-SSRS
ATTEMPT SINCE LAST CONTACT: NO
2. HAVE YOU ACTUALLY HAD ANY THOUGHTS OF KILLING YOURSELF?: NO
1. SINCE LAST CONTACT, HAVE YOU WISHED YOU WERE DEAD OR WISHED YOU COULD GO TO SLEEP AND NOT WAKE UP?: NO
SUICIDE, SINCE LAST CONTACT: NO
TOTAL  NUMBER OF INTERRUPTED ATTEMPTS SINCE LAST CONTACT: NO
6. HAVE YOU EVER DONE ANYTHING, STARTED TO DO ANYTHING, OR PREPARED TO DO ANYTHING TO END YOUR LIFE?: NO
TOTAL  NUMBER OF ABORTED OR SELF INTERRUPTED ATTEMPTS SINCE LAST CONTACT: NO

## 2022-12-07 NOTE — PROGRESS NOTES
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 prevent further detrioration.    7. BELINDA:    BELINDA:  Discussed the general effects of drugs and alcohol on health and well-being. Provider gave patient printed information about the  effects of chemical use on their health and well being. Recommendations:  none .     8. Records:   These were reviewed at time of assessment.   Information in this assessment was obtained from the medical record and  provided by patient who is a good historian.    Patient will have open access to their mental health medical record.    9.   Interactive Complexity: No      Provider Name/ Credentials:  SAURABH Domínguez    December 7, 2022

## 2022-12-09 ENCOUNTER — THERAPY VISIT (OUTPATIENT)
Dept: OCCUPATIONAL THERAPY | Facility: CLINIC | Age: 62
End: 2022-12-09
Payer: MEDICARE

## 2022-12-09 DIAGNOSIS — M25.649 STIFFNESS OF FINGER JOINT, UNSPECIFIED LATERALITY: ICD-10-CM

## 2022-12-09 PROCEDURE — 97110 THERAPEUTIC EXERCISES: CPT | Mod: GO | Performed by: OCCUPATIONAL THERAPIST

## 2022-12-09 NOTE — PROGRESS NOTES
Hand Therapy Progress Note  Current Date:  12/9/2022  Reporting Period: 8/4/22 to 12/9/2022    Diagnosis: Ulcer of finger, with fat layer exposed  DOI: ~gradual onset (7/26/2022 MD order date)    Precautions: Open finger wounds    Subjective:  Steve Morgan is a 62 year old male.    S:  Subjective changes as noted by patient: My fingers feel stiff.  I have been doing some of the exercises, but don't know that my motion has changed.     Functional changes noted by patient: difficulty with grasping tasks     Response to previous treatment:  good  Patient has noted adverse reaction to:   None      Objective:  Pain Level (Scale 0-10)   8/17/2022 12/9/22   At Rest 0/10 0/10   With Use 0/10 0/10     Pain Description  Date 8/17/2022   Location thumb, index finger, long finger, ring finger and small finger   Pain Quality Numb and Stiff   Frequency intermittent     Pain is worst  daytime   Exacerbated by  pinching   Relieved by stretch and none   Progression Gradually worsening, though slow progression     Edema  None    Sensation   Constantly - pt has neuropathy in hands and feet, unable to sense light pressure/textures. Can sense deep touch/pressure. Often drops items.  12/9/22: no change    ROM  Thumb 8/17/2022 8/17/2022   AROM  (PROM) R L   MP -26/55 -23/51   IP /61 /55   RABD 59 53   PABD 0 0     ROM  Hand Extension 8/17/2022 8/17/2022 12/9/22 12/9/22   AROM(PROM) R L R L   Index MP / / 0 0   PIP -41/ -69/ -55 -68   DIP 0/ -18/ -5 -30   Long MP / / 0 0   PIP -33/ -47/ -33 -55   DIP -35/ -8/ -35 -10   Ring MP / / 0 0   PIP -33/ -52/ -40 -60   DIP 0/ -17/ 0 -25   Small MP / / 0 0   PIP -36/ -66/ -45 -65   DIP / -19/ 0 -33     Strength  NT    Assessment:  Pt has only had the initial visit of hand therapy in August 2022.  Minimal change in AROM overall, slight worsening in finger AROM on the L overall.     Overall Assessment:  No change of symptoms has been noted.  STG/LTG:  STGoals have been reviewed;  see goal sheet for  details and updates.  LTGoals have been reviewed;  see goal sheet for details and updates.    I have re-evaluated this patient and find that the nature, scope, duration and intensity of the therapy is appropriate for the medical condition of the patient.    Plan:  Frequency:  1 X every other week, once daily  Duration:  for 12 weeks    Treatment Plan:   Modalities:  US, Fluidotherapy and Paraffin  Therapeutic Exercise:  AROM, AAROM, PROM, Tendon Gliding, Blocking, Reverse Blocking, Place and Hold, Contract Relax, Extensor Tracking, Isotonics, Isometrics and Stabilization    Neuromuscular re-education:  Nerve Gliding, Coordination/Dexterity, Sensory re-education, Desensitization, Kinesthetic Training, Proprioceptive Training, Posture, Kinesiotaping, Strain Counter Strain, Isometrics, Stabilization   Manual Techniques:  Coordination/Dexterity, Joint mobilization, Scar mobilization, Friction massage, Myofascial release, Manual edema mobilization   Orthotic Fabrication:  Static  Self Care:  Self Care Tasks, Ergonomic Considerations and Work     Discharge Plan:  Achieve all LTG.  Independent in home treatment program.  Reach maximal therapeutic benefit.    Home Exercise Program:  Thumb Joint Protection  EMR Notes  HEP - Sets  Reps  Sessions per day  Notes  Education Sheet General  EMR Notes  HEP - Sets  Reps  Sessions per day  Notes   Finger Passive Range of Motion PIP Joint Extension  EMR Notes  HEP - Sets 1  Reps 5 - 10-15 seconds each repetition  Sessions per day 3  Notes Gentle, pain free  Thumb Passive Range of Motion Radial Abduction  EMR Notes  HEP - Sets 1  Reps 5 repetitions - 10-15 second each repetition  Sessions per day 3  Notes  Finger Passive Range of Motion Tracking for Finger Extension on Table With Assist (#2)  EMR Notes  HEP - Sets 1  Reps 10  Sessions per day 3  Notes *Do not drag your hand across the table to avoid putting too much traction on the wounds and to avoid opening up the bandages *Use your  opposite hand to stretch your fingers flat against the table, holding for approximately 10-15 seconds per hand    Next Visit:  Check HEP, progress as tolerated  Consider splinting for nighttime

## 2022-12-11 PROBLEM — M25.649 FINGER STIFFNESS: Status: ACTIVE | Noted: 2022-12-11

## 2022-12-12 ENCOUNTER — HOSPITAL ENCOUNTER (OUTPATIENT)
Dept: PHYSICAL THERAPY | Facility: CLINIC | Age: 62
Setting detail: THERAPIES SERIES
Discharge: HOME | End: 2022-12-12
Attending: PHYSICIAN ASSISTANT
Payer: MEDICARE

## 2022-12-12 PROCEDURE — 97116 GAIT TRAINING THERAPY: CPT | Mod: GP | Performed by: PHYSICAL THERAPIST

## 2022-12-12 PROCEDURE — 97161 PT EVAL LOW COMPLEX 20 MIN: CPT | Mod: GP | Performed by: PHYSICAL THERAPIST

## 2022-12-12 NOTE — PROGRESS NOTES
[facility]    OUTPATIENT Occupational Therapy ORTHOPEDIC EVALUATION  PLAN OF TREATMENT FOR OUTPATIENT REHABILITATION  (COMPLETE FOR INITIAL CLAIMS ONLY)  Patient's Last Name, First Name, M.I.  YOB: 1960  Steve Morgan    Provider s Name:  [facility]   Medical Record No.  [id number]   Start of Care Date:  08/17/22   Onset Date:   07/26/22 (order date)   Treatment Diagnosis:  Finger ulcers Medical Diagnosis:  Data Unavailable       Goals:     12/09/22 0500   Goal #1   Goal #1 dressing   Previous Performance Level Independent   Current Functional Task Button   Current Performance Level Extreme difficulty donning buttons   STG Target Performance Top button of shirt   STG Target Perform Level Moderate difficulty using adaptive button hook   Due Date  01/08/23   If goal not met, Why? restarted goal with return to therapy   LTG Target Task/Performance Uses Adaptive Equipment   Due date 03/06/23   If goal not met, Why? restarted goal with return to therapy       Therapy Frequency:  1x every other week  Predicted Duration of Therapy Intervention:  6 weeks    Miriam Lester OT                 I CERTIFY THE NEED FOR THESE SERVICES FURNISHED UNDER        THIS PLAN OF TREATMENT AND WHILE UNDER MY CARE     (Physician attestation of this document indicates review and certification of the therapy plan).                     Certification Date From:  09/29/22   Certification Date To:  03/06/23    Referring Provider:  Paulina Abarca    Initial Assessment        See Epic Evaluation SOC Date: 08/17/22

## 2022-12-20 ENCOUNTER — OFFICE VISIT (OUTPATIENT)
Dept: URGENT CARE | Facility: URGENT CARE | Age: 62
End: 2022-12-20
Payer: MEDICARE

## 2022-12-20 ENCOUNTER — VIRTUAL VISIT (OUTPATIENT)
Dept: FAMILY MEDICINE | Facility: CLINIC | Age: 62
End: 2022-12-20
Payer: MEDICARE

## 2022-12-20 VITALS
SYSTOLIC BLOOD PRESSURE: 158 MMHG | TEMPERATURE: 98.1 F | HEART RATE: 81 BPM | DIASTOLIC BLOOD PRESSURE: 91 MMHG | OXYGEN SATURATION: 99 % | RESPIRATION RATE: 16 BRPM

## 2022-12-20 DIAGNOSIS — H69.91 DYSFUNCTION OF RIGHT EUSTACHIAN TUBE: ICD-10-CM

## 2022-12-20 DIAGNOSIS — E78.5 HYPERLIPIDEMIA LDL GOAL <100: ICD-10-CM

## 2022-12-20 DIAGNOSIS — J01.00 ACUTE NON-RECURRENT MAXILLARY SINUSITIS: Primary | ICD-10-CM

## 2022-12-20 DIAGNOSIS — R73.9 HYPERGLYCEMIA: ICD-10-CM

## 2022-12-20 DIAGNOSIS — L98.491: Primary | ICD-10-CM

## 2022-12-20 DIAGNOSIS — K21.00 GASTROESOPHAGEAL REFLUX DISEASE WITH ESOPHAGITIS WITHOUT HEMORRHAGE: ICD-10-CM

## 2022-12-20 DIAGNOSIS — E11.40 TYPE 2 DIABETES MELLITUS WITH DIABETIC NEUROPATHY, WITHOUT LONG-TERM CURRENT USE OF INSULIN (H): ICD-10-CM

## 2022-12-20 DIAGNOSIS — N40.1 BENIGN PROSTATIC HYPERPLASIA WITH URINARY HESITANCY: ICD-10-CM

## 2022-12-20 DIAGNOSIS — R39.11 BENIGN PROSTATIC HYPERPLASIA WITH URINARY HESITANCY: ICD-10-CM

## 2022-12-20 PROCEDURE — 99213 OFFICE O/P EST LOW 20 MIN: CPT | Mod: 25 | Performed by: PHYSICIAN ASSISTANT

## 2022-12-20 PROCEDURE — 99213 OFFICE O/P EST LOW 20 MIN: CPT | Mod: 95 | Performed by: PHYSICIAN ASSISTANT

## 2022-12-20 RX ORDER — PANTOPRAZOLE SODIUM 40 MG/1
1 TABLET, DELAYED RELEASE ORAL
Status: ON HOLD | COMMUNITY
Start: 2021-10-21 | End: 2022-12-23

## 2022-12-20 RX ORDER — MUPIROCIN 20 MG/G
OINTMENT TOPICAL EVERY 8 HOURS
Status: ON HOLD | COMMUNITY
Start: 2021-10-21 | End: 2022-12-23

## 2022-12-20 RX ORDER — LOSARTAN POTASSIUM 100 MG/1
1 TABLET ORAL EVERY 24 HOURS
Status: ON HOLD | COMMUNITY
Start: 2021-10-21 | End: 2022-12-23

## 2022-12-20 RX ORDER — FINASTERIDE 5 MG/1
1 TABLET, FILM COATED ORAL DAILY
Qty: 90 TABLET | Refills: 1 | Status: SHIPPED | OUTPATIENT
Start: 2022-12-20 | End: 2023-06-30

## 2022-12-20 RX ORDER — NYSTATIN 100000 U/G
OINTMENT TOPICAL EVERY 12 HOURS
Status: ON HOLD | COMMUNITY
Start: 2021-10-21 | End: 2022-12-23

## 2022-12-20 RX ORDER — FAMOTIDINE 40 MG/1
40 TABLET, FILM COATED ORAL DAILY
Qty: 90 TABLET | Refills: 1 | Status: SHIPPED | OUTPATIENT
Start: 2022-12-20 | End: 2023-05-04

## 2022-12-20 RX ORDER — BUPROPION HYDROCHLORIDE 300 MG/1
1 TABLET ORAL EVERY 24 HOURS
Status: ON HOLD | COMMUNITY
Start: 2021-10-21 | End: 2022-12-23

## 2022-12-20 RX ORDER — LEVOTHYROXINE SODIUM 137 UG/1
1 CAPSULE ORAL EVERY 24 HOURS
Status: ON HOLD | COMMUNITY
Start: 2021-10-21 | End: 2022-12-23

## 2022-12-20 RX ORDER — SIMVASTATIN 20 MG
20 TABLET ORAL AT BEDTIME
Qty: 90 TABLET | Refills: 1 | Status: ON HOLD | OUTPATIENT
Start: 2022-12-20 | End: 2024-05-18

## 2022-12-20 RX ORDER — FLUTICASONE PROPIONATE 50 MCG
1 SPRAY, SUSPENSION (ML) NASAL 2 TIMES DAILY
Qty: 16 ML | Refills: 3 | Status: ON HOLD | OUTPATIENT
Start: 2022-12-20 | End: 2022-12-23

## 2022-12-20 RX ORDER — AMMONIUM LACTATE 12 G/100G
CREAM TOPICAL
Status: ON HOLD | COMMUNITY
Start: 2021-10-21 | End: 2022-12-23

## 2022-12-20 RX ORDER — FAMOTIDINE 40 MG/1
1-2 TABLET, FILM COATED ORAL EVERY 24 HOURS
Status: ON HOLD | COMMUNITY
Start: 2021-10-21 | End: 2022-12-23

## 2022-12-20 RX ORDER — SIMVASTATIN 20 MG
1 TABLET ORAL EVERY 24 HOURS
Status: ON HOLD | COMMUNITY
Start: 2021-10-21 | End: 2022-12-23

## 2022-12-20 RX ORDER — LATANOPROST 50 UG/ML
SOLUTION/ DROPS OPHTHALMIC
COMMUNITY
Start: 2022-09-23

## 2022-12-20 RX ORDER — FINASTERIDE 5 MG/1
1 TABLET, FILM COATED ORAL EVERY 24 HOURS
Status: ON HOLD | COMMUNITY
Start: 2021-10-21 | End: 2022-12-23

## 2022-12-20 ASSESSMENT — ANXIETY QUESTIONNAIRES
4. TROUBLE RELAXING: NOT AT ALL
7. FEELING AFRAID AS IF SOMETHING AWFUL MIGHT HAPPEN: NOT AT ALL
IF YOU CHECKED OFF ANY PROBLEMS ON THIS QUESTIONNAIRE, HOW DIFFICULT HAVE THESE PROBLEMS MADE IT FOR YOU TO DO YOUR WORK, TAKE CARE OF THINGS AT HOME, OR GET ALONG WITH OTHER PEOPLE: NOT DIFFICULT AT ALL
1. FEELING NERVOUS, ANXIOUS, OR ON EDGE: NOT AT ALL
GAD7 TOTAL SCORE: 2
2. NOT BEING ABLE TO STOP OR CONTROL WORRYING: NOT AT ALL
8. IF YOU CHECKED OFF ANY PROBLEMS, HOW DIFFICULT HAVE THESE MADE IT FOR YOU TO DO YOUR WORK, TAKE CARE OF THINGS AT HOME, OR GET ALONG WITH OTHER PEOPLE?: NOT DIFFICULT AT ALL
6. BECOMING EASILY ANNOYED OR IRRITABLE: SEVERAL DAYS
GAD7 TOTAL SCORE: 2
7. FEELING AFRAID AS IF SOMETHING AWFUL MIGHT HAPPEN: NOT AT ALL
GAD7 TOTAL SCORE: 2
3. WORRYING TOO MUCH ABOUT DIFFERENT THINGS: SEVERAL DAYS
5. BEING SO RESTLESS THAT IT IS HARD TO SIT STILL: NOT AT ALL

## 2022-12-20 ASSESSMENT — PATIENT HEALTH QUESTIONNAIRE - PHQ9
SUM OF ALL RESPONSES TO PHQ QUESTIONS 1-9: 6
SUM OF ALL RESPONSES TO PHQ QUESTIONS 1-9: 6
10. IF YOU CHECKED OFF ANY PROBLEMS, HOW DIFFICULT HAVE THESE PROBLEMS MADE IT FOR YOU TO DO YOUR WORK, TAKE CARE OF THINGS AT HOME, OR GET ALONG WITH OTHER PEOPLE: NOT DIFFICULT AT ALL

## 2022-12-20 NOTE — PROGRESS NOTES
Assessment & Plan     Skin ulcer of middle finger, limited to breakdown of skin (H)  This is acute on chronic.  He follows with the wound care clinic in [place].  He has an appointment with them tomorrow however there is a possibility of a snowstorm tomorrow reason which prompted this visit tonight.  No red streaks noted on exam tonight.  Of note, he was prescribed Augmentin this morning for a sinus infection.  We discussed the Augmentin will help with presumed infection of the right middle finger as well.  Follow-up with the wound care clinic for recheck. Continue using his hydrocolloid dressings.  Patient agrees with the plan.       Return in about 1 day (around 12/21/2022) for follow up with the wound clinic..    Loren Rees PA-C  [facility]    Odalis Kearney is a 62 year old male who presents to clinic today for the following health issues:  Chief Complaint   Patient presents with     Infection     Right middle finger is infected, he is working with the wound center     HPI    Patient with a history of type 2 diabetes with diabetic neuropathy, Hypothyroidism, DAMIÁN, PVD, HTN, HLD, among others presenting to urgent care today with concern for wound infection right middle finger.  He has a history of ulcer of the fingers and has been following up with wound care.  He notes he has an appointment for follow-up with wound care tomorrow, but given the weather forecast of a snowstorm possiby tomorrow he wanted to come in tonight to get evaluated in case he was not able to make it tomorrow. He reports today increased swelling and has been noting redness of the right middle finger since this morning. No fever/chills. No open wounds or drainage from the affected finger.  He has Skin-Prep and hydrocolloid dressings at home that he uses.      Review of Systems  Constitutional, HEENT, cardiovascular, pulmonary, GI, , musculoskeletal, neuro, skin, endocrine and psych systems are negative,  except as otherwise noted.      Objective    BP (!) 158/91   Pulse 81   Temp 98.1  F (36.7  C)   Resp 16   SpO2 99%   Physical Exam   GENERAL: healthy, alert and no distress  SKIN: Healing ulceration wound right middle finger, nofat layer exposed, mild erythema noted dorsal aspect of the finger, no acute drainage, the finger is not cold or pale, he has poor sensation overall due to his neuropathy. No red streaks noted

## 2022-12-20 NOTE — PROGRESS NOTES
"Caio is a 62 year old who is being evaluated via a billable video visit.      How would you like to obtain your AVS? MyChart   If the video visit is dropped, the invitation should be resent by: Text to cell phone: [phone]  Will anyone else be joining your video visit? No        Assessment & Plan     Gastroesophageal reflux disease with esophagitis without hemorrhage  refilled  - famotidine (PEPCID) 40 MG tablet; Take 1 tablet (40 mg) by mouth daily    Benign prostatic hyperplasia with urinary hesitancy  refilled  - finasteride (PROSCAR) 5 MG tablet; Take 1 tablet (5 mg) by mouth daily    Hyperglycemia  Due to labs  - metFORMIN (GLUCOPHAGE) 1000 MG tablet; Take 1 tablet (1,000 mg) by mouth 2 times daily (with meals)    Type 2 diabetes mellitus with diabetic neuropathy, without long-term current use of insulin (H)  Await labs.   - metFORMIN (GLUCOPHAGE) 1000 MG tablet; Take 1 tablet (1,000 mg) by mouth 2 times daily (with meals)    Hyperlipidemia LDL goal <100    - simvastatin (ZOCOR) 20 MG tablet; Take 1 tablet (20 mg) by mouth At Bedtime    Dysfunction of right eustachian tube    - fluticasone (FLONASE) 50 MCG/ACT nasal spray; Spray 1 spray into both nostrils 2 times daily    Acute non-recurrent maxillary sinusitis    - amoxicillin-clavulanate (AUGMENTIN) 875-125 MG tablet; Take 1 tablet by mouth 2 times daily for 14 days             BMI:   Estimated body mass index is 40.77 kg/m  as calculated from the following:    Height as of 6/15/22: 1.651 m (5' 5\").    Weight as of 6/15/22: 111.1 kg (245 lb).       Labs.     No follow-ups on file.    ANASTASIA [facility] HERI Kearney is a 62 year old, presenting for the following health issues:  Cough      History of Present Illness       Reason for visit:  Nagging dry cough  Symptom onset:  More than a month  Symptoms include:  Nagging dry cough  Symptom intensity:  Moderate  Symptom progression:  Staying the same  Had " "these symptoms before:  No  What makes it worse:  No  What makes it better:  No    He eats 0-1 servings of fruits and vegetables daily.He consumes 0 sweetened beverage(s) daily.He exercises with enough effort to increase his heart rate 9 or less minutes per day.  He exercises with enough effort to increase his heart rate 3 or less days per week. He is missing 3 dose(s) of medications per week.  He is not taking prescribed medications regularly due to remembering to take.    Today's PHQ-9         PHQ-9 Total Score: 6    PHQ-9 Q9 Thoughts of better off dead/self-harm past 2 weeks :   Not at all    How difficult have these problems made it for you to do your work, take care of things at home, or get along with other people: Not difficult at all  Today's JOSE MANUEL-7 Score: 2     Pt also with congestion and sinus pressue.   Review of Systems   Constitutional, HEENT, cardiovascular, pulmonary, gi and gu systems are negative, except as otherwise noted.      Objective    Vitals - Patient Reported  Weight (Patient Reported): 113.4 kg (250 lb)  Height (Patient Reported): 165.1 cm (5' 5\")  BMI (Based on Pt Reported Ht/Wt): 41.6      Vitals:  No vitals were obtained today due to virtual visit.    Physical Exam   GENERAL: Healthy, alert and no distress  EYES: Eyes grossly normal to inspection.  No discharge or erythema, or obvious scleral/conjunctival abnormalities.  RESP: No audible wheeze, cough, or visible cyanosis.  No visible retractions or increased work of breathing.    SKIN: Visible skin clear. No significant rash, abnormal pigmentation or lesions.  NEURO: Cranial nerves grossly intact.  Mentation and speech appropriate for age.  PSYCH: Mentation appears normal, affect normal/bright, judgement and insight intact, normal speech and appearance well-groomed.                Video-Visit Details    Video Start Time: 1:54 PM    Type of service:  Video Visit    Video End Time:2:07 PM    Originating Location (pt. Location): " Home    Distant Location (provider location):  Off-site    Platform used for Video Visit: Tessa

## 2022-12-21 NOTE — PATIENT INSTRUCTIONS
The antibiotic Augmentin you were prescribed this morning for your sinus infection will also help with your finger infection.  Please follow-up with the wound clinic as discussed.  Please go to the emergency room if any worsening symptoms including red streaks, high fever etc...

## 2022-12-22 ENCOUNTER — HOSPITAL ENCOUNTER (INPATIENT)
Facility: CLINIC | Age: 62
LOS: 7 days | Discharge: NURSING, REHAB OR LONG-TERM CARE | DRG: 854 | End: 2022-12-29
Attending: EMERGENCY MEDICINE | Admitting: INTERNAL MEDICINE
Payer: MEDICARE

## 2022-12-22 ENCOUNTER — APPOINTMENT (OUTPATIENT)
Dept: GENERAL RADIOLOGY | Facility: CLINIC | Age: 62
DRG: 854 | End: 2022-12-22
Attending: EMERGENCY MEDICINE
Payer: MEDICARE

## 2022-12-22 DIAGNOSIS — E87.1 HYPONATREMIA: ICD-10-CM

## 2022-12-22 DIAGNOSIS — M86.9 FINGER OSTEOMYELITIS, RIGHT (H): Primary | ICD-10-CM

## 2022-12-22 DIAGNOSIS — L03.011 CELLULITIS OF FINGER OF RIGHT HAND: ICD-10-CM

## 2022-12-22 DIAGNOSIS — A41.9 ACUTE SEPSIS (H): ICD-10-CM

## 2022-12-22 LAB
ALBUMIN SERPL BCG-MCNC: 3.8 G/DL (ref 3.5–5.2)
ALP SERPL-CCNC: 115 U/L (ref 40–129)
ALT SERPL W P-5'-P-CCNC: 38 U/L (ref 10–50)
ANION GAP SERPL CALCULATED.3IONS-SCNC: 14 MMOL/L (ref 7–15)
AST SERPL W P-5'-P-CCNC: 36 U/L (ref 10–50)
BASOPHILS # BLD AUTO: 0 10E3/UL (ref 0–0.2)
BASOPHILS NFR BLD AUTO: 0 %
BILIRUB SERPL-MCNC: 0.8 MG/DL
BUN SERPL-MCNC: 16.7 MG/DL (ref 8–23)
CALCIUM SERPL-MCNC: 8.3 MG/DL (ref 8.8–10.2)
CHLORIDE SERPL-SCNC: 82 MMOL/L (ref 98–107)
CREAT SERPL-MCNC: 1.51 MG/DL (ref 0.67–1.17)
DEPRECATED HCO3 PLAS-SCNC: 22 MMOL/L (ref 22–29)
EOSINOPHIL # BLD AUTO: 0 10E3/UL (ref 0–0.7)
EOSINOPHIL NFR BLD AUTO: 0 %
ERYTHROCYTE [DISTWIDTH] IN BLOOD BY AUTOMATED COUNT: 13.1 % (ref 10–15)
FLUAV RNA SPEC QL NAA+PROBE: NEGATIVE
FLUBV RNA RESP QL NAA+PROBE: NEGATIVE
GFR SERPL CREATININE-BSD FRML MDRD: 51 ML/MIN/1.73M2
GLUCOSE SERPL-MCNC: 253 MG/DL (ref 70–99)
HCT VFR BLD AUTO: 26.9 % (ref 40–53)
HGB BLD-MCNC: 9.1 G/DL (ref 13.3–17.7)
IMM GRANULOCYTES # BLD: 0.2 10E3/UL
IMM GRANULOCYTES NFR BLD: 1 %
LACTATE SERPL-SCNC: 0.9 MMOL/L (ref 0.7–2)
LYMPHOCYTES # BLD AUTO: 0.9 10E3/UL (ref 0.8–5.3)
LYMPHOCYTES NFR BLD AUTO: 6 %
MCH RBC QN AUTO: 29.1 PG (ref 26.5–33)
MCHC RBC AUTO-ENTMCNC: 33.8 G/DL (ref 31.5–36.5)
MCV RBC AUTO: 86 FL (ref 78–100)
MONOCYTES # BLD AUTO: 1.2 10E3/UL (ref 0–1.3)
MONOCYTES NFR BLD AUTO: 8 %
NEUTROPHILS # BLD AUTO: 13.3 10E3/UL (ref 1.6–8.3)
NEUTROPHILS NFR BLD AUTO: 85 %
NRBC # BLD AUTO: 0 10E3/UL
NRBC BLD AUTO-RTO: 0 /100
PLATELET # BLD AUTO: 298 10E3/UL (ref 150–450)
POTASSIUM SERPL-SCNC: 4.7 MMOL/L (ref 3.4–5.3)
PROT SERPL-MCNC: 7.7 G/DL (ref 6.4–8.3)
RBC # BLD AUTO: 3.13 10E6/UL (ref 4.4–5.9)
RSV RNA SPEC NAA+PROBE: NEGATIVE
SARS-COV-2 RNA RESP QL NAA+PROBE: NEGATIVE
SODIUM SERPL-SCNC: 118 MMOL/L (ref 136–145)
WBC # BLD AUTO: 15.6 10E3/UL (ref 4–11)

## 2022-12-22 PROCEDURE — 99223 1ST HOSP IP/OBS HIGH 75: CPT | Mod: AI | Performed by: INTERNAL MEDICINE

## 2022-12-22 PROCEDURE — 258N000003 HC RX IP 258 OP 636: Performed by: EMERGENCY MEDICINE

## 2022-12-22 PROCEDURE — 71046 X-RAY EXAM CHEST 2 VIEWS: CPT

## 2022-12-22 PROCEDURE — 120N000001 HC R&B MED SURG/OB

## 2022-12-22 PROCEDURE — 96365 THER/PROPH/DIAG IV INF INIT: CPT

## 2022-12-22 PROCEDURE — 36415 COLL VENOUS BLD VENIPUNCTURE: CPT | Performed by: EMERGENCY MEDICINE

## 2022-12-22 PROCEDURE — 83605 ASSAY OF LACTIC ACID: CPT | Performed by: EMERGENCY MEDICINE

## 2022-12-22 PROCEDURE — 96361 HYDRATE IV INFUSION ADD-ON: CPT

## 2022-12-22 PROCEDURE — 85025 COMPLETE CBC W/AUTO DIFF WBC: CPT | Performed by: EMERGENCY MEDICINE

## 2022-12-22 PROCEDURE — 99285 EMERGENCY DEPT VISIT HI MDM: CPT | Mod: CS,25

## 2022-12-22 PROCEDURE — 87637 SARSCOV2&INF A&B&RSV AMP PRB: CPT | Performed by: EMERGENCY MEDICINE

## 2022-12-22 PROCEDURE — C9803 HOPD COVID-19 SPEC COLLECT: HCPCS

## 2022-12-22 PROCEDURE — 250N000013 HC RX MED GY IP 250 OP 250 PS 637: Performed by: INTERNAL MEDICINE

## 2022-12-22 PROCEDURE — 93005 ELECTROCARDIOGRAM TRACING: CPT

## 2022-12-22 PROCEDURE — 87040 BLOOD CULTURE FOR BACTERIA: CPT | Performed by: EMERGENCY MEDICINE

## 2022-12-22 PROCEDURE — 73140 X-RAY EXAM OF FINGER(S): CPT | Mod: RT

## 2022-12-22 PROCEDURE — 250N000011 HC RX IP 250 OP 636: Performed by: EMERGENCY MEDICINE

## 2022-12-22 PROCEDURE — 96375 TX/PRO/DX INJ NEW DRUG ADDON: CPT

## 2022-12-22 PROCEDURE — 80053 COMPREHEN METABOLIC PANEL: CPT | Performed by: EMERGENCY MEDICINE

## 2022-12-22 RX ORDER — ONDANSETRON 2 MG/ML
4 INJECTION INTRAMUSCULAR; INTRAVENOUS EVERY 6 HOURS PRN
Status: DISCONTINUED | OUTPATIENT
Start: 2022-12-22 | End: 2022-12-23

## 2022-12-22 RX ORDER — AMOXICILLIN 250 MG
2 CAPSULE ORAL 2 TIMES DAILY PRN
Status: DISCONTINUED | OUTPATIENT
Start: 2022-12-22 | End: 2022-12-29 | Stop reason: HOSPADM

## 2022-12-22 RX ORDER — VANCOMYCIN HYDROCHLORIDE 1 G/200ML
1000 INJECTION, SOLUTION INTRAVENOUS EVERY 24 HOURS
Status: DISCONTINUED | OUTPATIENT
Start: 2022-12-23 | End: 2022-12-24 | Stop reason: DRUGHIGH

## 2022-12-22 RX ORDER — CEFTRIAXONE 2 G/1
2 INJECTION, POWDER, FOR SOLUTION INTRAMUSCULAR; INTRAVENOUS EVERY 24 HOURS
Status: DISCONTINUED | OUTPATIENT
Start: 2022-12-23 | End: 2022-12-24

## 2022-12-22 RX ORDER — ACETAMINOPHEN 650 MG/1
650 SUPPOSITORY RECTAL EVERY 6 HOURS PRN
Status: DISCONTINUED | OUTPATIENT
Start: 2022-12-22 | End: 2022-12-23

## 2022-12-22 RX ORDER — ACETAMINOPHEN 325 MG/1
650 TABLET ORAL EVERY 6 HOURS PRN
Status: DISCONTINUED | OUTPATIENT
Start: 2022-12-22 | End: 2022-12-23

## 2022-12-22 RX ORDER — AMOXICILLIN 250 MG
1 CAPSULE ORAL 2 TIMES DAILY PRN
Status: DISCONTINUED | OUTPATIENT
Start: 2022-12-22 | End: 2022-12-29 | Stop reason: HOSPADM

## 2022-12-22 RX ORDER — HYDROMORPHONE HCL IN WATER/PF 6 MG/30 ML
0.2 PATIENT CONTROLLED ANALGESIA SYRINGE INTRAVENOUS
Status: DISCONTINUED | OUTPATIENT
Start: 2022-12-22 | End: 2022-12-23

## 2022-12-22 RX ORDER — SODIUM CHLORIDE 9 MG/ML
INJECTION, SOLUTION INTRAVENOUS CONTINUOUS
Status: DISCONTINUED | OUTPATIENT
Start: 2022-12-22 | End: 2022-12-23

## 2022-12-22 RX ORDER — SODIUM CHLORIDE 9 MG/ML
INJECTION, SOLUTION INTRAVENOUS CONTINUOUS
Status: DISCONTINUED | OUTPATIENT
Start: 2022-12-22 | End: 2022-12-25

## 2022-12-22 RX ORDER — LIDOCAINE 40 MG/G
CREAM TOPICAL
Status: DISCONTINUED | OUTPATIENT
Start: 2022-12-22 | End: 2022-12-23

## 2022-12-22 RX ORDER — ONDANSETRON 4 MG/1
4 TABLET, ORALLY DISINTEGRATING ORAL EVERY 6 HOURS PRN
Status: DISCONTINUED | OUTPATIENT
Start: 2022-12-22 | End: 2022-12-23

## 2022-12-22 RX ADMIN — TAZOBACTAM SODIUM AND PIPERACILLIN SODIUM 4.5 G: 500; 4 INJECTION, SOLUTION INTRAVENOUS at 18:42

## 2022-12-22 RX ADMIN — VANCOMYCIN HYDROCHLORIDE 2250 MG: 500 INJECTION, POWDER, LYOPHILIZED, FOR SOLUTION INTRAVENOUS at 21:20

## 2022-12-22 RX ADMIN — ACETAMINOPHEN 650 MG: 325 TABLET, FILM COATED ORAL at 22:27

## 2022-12-22 RX ADMIN — SODIUM CHLORIDE 1000 ML: 9 INJECTION, SOLUTION INTRAVENOUS at 18:43

## 2022-12-22 ASSESSMENT — ENCOUNTER SYMPTOMS
FEVER: 0
COUGH: 1
COLOR CHANGE: 1
WOUND: 1
JOINT SWELLING: 1
SHORTNESS OF BREATH: 1

## 2022-12-22 ASSESSMENT — ACTIVITIES OF DAILY LIVING (ADL)
ADLS_ACUITY_SCORE: 35

## 2022-12-22 NOTE — ED TRIAGE NOTES
"Pt presents for evaluation of a \"dry hack\" since October. Called PCP and was given abx last week for possible sinus infection. Was also seen at an UC for wounds, due to weather permitting wound care center visit and was told abx prescribed for sinus issue should cover wounds. Pt has multiple wounds to bilateral hands. Is a diabetic. On Tuesday, the left 3rd finger started to swell with erythema. Denies any new injury to digits. Hx of neuropathy. Wounds started as blisters. Is followed by a WOC.       "

## 2022-12-23 ENCOUNTER — ANESTHESIA EVENT (OUTPATIENT)
Dept: SURGERY | Facility: CLINIC | Age: 62
DRG: 854 | End: 2022-12-23
Payer: MEDICARE

## 2022-12-23 ENCOUNTER — ANESTHESIA (OUTPATIENT)
Dept: SURGERY | Facility: CLINIC | Age: 62
DRG: 854 | End: 2022-12-23
Payer: MEDICARE

## 2022-12-23 LAB
ABO/RH(D): NORMAL
ANION GAP SERPL CALCULATED.3IONS-SCNC: 14 MMOL/L (ref 7–15)
ANTIBODY SCREEN: NEGATIVE
ATRIAL RATE - MUSE: 103 BPM
BASOPHILS # BLD AUTO: 0 10E3/UL (ref 0–0.2)
BASOPHILS NFR BLD AUTO: 0 %
BUN SERPL-MCNC: 15 MG/DL (ref 8–23)
CALCIUM SERPL-MCNC: 8 MG/DL (ref 8.8–10.2)
CHLORIDE SERPL-SCNC: 93 MMOL/L (ref 98–107)
CREAT SERPL-MCNC: 1.36 MG/DL (ref 0.67–1.17)
CRP SERPL-MCNC: 199.19 MG/L
CRP SERPL-MCNC: 200.88 MG/L
DEPRECATED HCO3 PLAS-SCNC: 20 MMOL/L (ref 22–29)
DIASTOLIC BLOOD PRESSURE - MUSE: NORMAL MMHG
EOSINOPHIL # BLD AUTO: 0.1 10E3/UL (ref 0–0.7)
EOSINOPHIL NFR BLD AUTO: 1 %
ERYTHROCYTE [DISTWIDTH] IN BLOOD BY AUTOMATED COUNT: 13.2 % (ref 10–15)
GFR SERPL CREATININE-BSD FRML MDRD: 58 ML/MIN/1.73M2
GLUCOSE BLDC GLUCOMTR-MCNC: 148 MG/DL (ref 70–99)
GLUCOSE BLDC GLUCOMTR-MCNC: 148 MG/DL (ref 70–99)
GLUCOSE BLDC GLUCOMTR-MCNC: 161 MG/DL (ref 70–99)
GLUCOSE BLDC GLUCOMTR-MCNC: 166 MG/DL (ref 70–99)
GLUCOSE BLDC GLUCOMTR-MCNC: 170 MG/DL (ref 70–99)
GLUCOSE BLDC GLUCOMTR-MCNC: 175 MG/DL (ref 70–99)
GLUCOSE BLDC GLUCOMTR-MCNC: 182 MG/DL (ref 70–99)
GLUCOSE BLDC GLUCOMTR-MCNC: 253 MG/DL (ref 70–99)
GLUCOSE SERPL-MCNC: 168 MG/DL (ref 70–99)
GRAM STAIN RESULT: ABNORMAL
HCT VFR BLD AUTO: 26.2 % (ref 40–53)
HGB BLD-MCNC: 8.5 G/DL (ref 13.3–17.7)
IMM GRANULOCYTES # BLD: 0.1 10E3/UL
IMM GRANULOCYTES NFR BLD: 1 %
INTERPRETATION ECG - MUSE: NORMAL
LYMPHOCYTES # BLD AUTO: 1.2 10E3/UL (ref 0.8–5.3)
LYMPHOCYTES NFR BLD AUTO: 10 %
MCH RBC QN AUTO: 28.4 PG (ref 26.5–33)
MCHC RBC AUTO-ENTMCNC: 32.4 G/DL (ref 31.5–36.5)
MCV RBC AUTO: 88 FL (ref 78–100)
MONOCYTES # BLD AUTO: 1.1 10E3/UL (ref 0–1.3)
MONOCYTES NFR BLD AUTO: 9 %
NEUTROPHILS # BLD AUTO: 9.3 10E3/UL (ref 1.6–8.3)
NEUTROPHILS NFR BLD AUTO: 79 %
NRBC # BLD AUTO: 0 10E3/UL
NRBC BLD AUTO-RTO: 0 /100
P AXIS - MUSE: 60 DEGREES
PLATELET # BLD AUTO: 270 10E3/UL (ref 150–450)
POTASSIUM SERPL-SCNC: 4.1 MMOL/L (ref 3.4–5.3)
PR INTERVAL - MUSE: 154 MS
QRS DURATION - MUSE: 74 MS
QT - MUSE: 310 MS
QTC - MUSE: 406 MS
R AXIS - MUSE: -17 DEGREES
RBC # BLD AUTO: 2.99 10E6/UL (ref 4.4–5.9)
SODIUM SERPL-SCNC: 121 MMOL/L (ref 136–145)
SODIUM SERPL-SCNC: 123 MMOL/L (ref 136–145)
SODIUM SERPL-SCNC: 126 MMOL/L (ref 136–145)
SODIUM SERPL-SCNC: 127 MMOL/L (ref 136–145)
SPECIMEN EXPIRATION DATE: NORMAL
SYSTOLIC BLOOD PRESSURE - MUSE: NORMAL MMHG
T AXIS - MUSE: 33 DEGREES
VENTRICULAR RATE- MUSE: 103 BPM
WBC # BLD AUTO: 11.9 10E3/UL (ref 4–11)

## 2022-12-23 PROCEDURE — 84295 ASSAY OF SERUM SODIUM: CPT | Performed by: INTERNAL MEDICINE

## 2022-12-23 PROCEDURE — 86140 C-REACTIVE PROTEIN: CPT | Performed by: PHYSICIAN ASSISTANT

## 2022-12-23 PROCEDURE — 36415 COLL VENOUS BLD VENIPUNCTURE: CPT | Performed by: INTERNAL MEDICINE

## 2022-12-23 PROCEDURE — 87075 CULTR BACTERIA EXCEPT BLOOD: CPT | Performed by: EMERGENCY MEDICINE

## 2022-12-23 PROCEDURE — 999N000141 HC STATISTIC PRE-PROCEDURE NURSING ASSESSMENT: Performed by: ORTHOPAEDIC SURGERY

## 2022-12-23 PROCEDURE — 250N000012 HC RX MED GY IP 250 OP 636 PS 637: Performed by: INTERNAL MEDICINE

## 2022-12-23 PROCEDURE — 360N000076 HC SURGERY LEVEL 3, PER MIN: Performed by: ORTHOPAEDIC SURGERY

## 2022-12-23 PROCEDURE — 258N000003 HC RX IP 258 OP 636: Performed by: INTERNAL MEDICINE

## 2022-12-23 PROCEDURE — 258N000003 HC RX IP 258 OP 636: Performed by: NURSE ANESTHETIST, CERTIFIED REGISTERED

## 2022-12-23 PROCEDURE — 0X6S0Z3 DETACHMENT AT RIGHT RING FINGER, LOW, OPEN APPROACH: ICD-10-PCS | Performed by: ORTHOPAEDIC SURGERY

## 2022-12-23 PROCEDURE — 80051 ELECTROLYTE PANEL: CPT | Performed by: INTERNAL MEDICINE

## 2022-12-23 PROCEDURE — 250N000011 HC RX IP 250 OP 636: Performed by: ANESTHESIOLOGY

## 2022-12-23 PROCEDURE — 86901 BLOOD TYPING SEROLOGIC RH(D): CPT | Performed by: PHYSICIAN ASSISTANT

## 2022-12-23 PROCEDURE — 250N000011 HC RX IP 250 OP 636: Performed by: PHYSICIAN ASSISTANT

## 2022-12-23 PROCEDURE — 250N000013 HC RX MED GY IP 250 OP 250 PS 637: Performed by: INTERNAL MEDICINE

## 2022-12-23 PROCEDURE — 120N000001 HC R&B MED SURG/OB

## 2022-12-23 PROCEDURE — 87077 CULTURE AEROBIC IDENTIFY: CPT | Performed by: ORTHOPAEDIC SURGERY

## 2022-12-23 PROCEDURE — 86140 C-REACTIVE PROTEIN: CPT | Performed by: INTERNAL MEDICINE

## 2022-12-23 PROCEDURE — 36415 COLL VENOUS BLD VENIPUNCTURE: CPT | Performed by: PHYSICIAN ASSISTANT

## 2022-12-23 PROCEDURE — 250N000011 HC RX IP 250 OP 636: Performed by: NURSE ANESTHETIST, CERTIFIED REGISTERED

## 2022-12-23 PROCEDURE — 250N000013 HC RX MED GY IP 250 OP 250 PS 637: Performed by: HOSPITALIST

## 2022-12-23 PROCEDURE — 85004 AUTOMATED DIFF WBC COUNT: CPT | Performed by: INTERNAL MEDICINE

## 2022-12-23 PROCEDURE — 87070 CULTURE OTHR SPECIMN AEROBIC: CPT | Performed by: ORTHOPAEDIC SURGERY

## 2022-12-23 PROCEDURE — 87641 MR-STAPH DNA AMP PROBE: CPT | Performed by: EMERGENCY MEDICINE

## 2022-12-23 PROCEDURE — 710N000010 HC RECOVERY PHASE 1, LEVEL 2, PER MIN: Performed by: ORTHOPAEDIC SURGERY

## 2022-12-23 PROCEDURE — 86850 RBC ANTIBODY SCREEN: CPT | Performed by: PHYSICIAN ASSISTANT

## 2022-12-23 PROCEDURE — 250N000011 HC RX IP 250 OP 636: Performed by: INTERNAL MEDICINE

## 2022-12-23 PROCEDURE — 370N000017 HC ANESTHESIA TECHNICAL FEE, PER MIN: Performed by: ORTHOPAEDIC SURGERY

## 2022-12-23 PROCEDURE — 99222 1ST HOSP IP/OBS MODERATE 55: CPT | Performed by: INTERNAL MEDICINE

## 2022-12-23 PROCEDURE — 258N000003 HC RX IP 258 OP 636: Performed by: EMERGENCY MEDICINE

## 2022-12-23 PROCEDURE — 250N000009 HC RX 250: Performed by: ANESTHESIOLOGY

## 2022-12-23 PROCEDURE — 250N000009 HC RX 250: Performed by: ORTHOPAEDIC SURGERY

## 2022-12-23 PROCEDURE — 87205 SMEAR GRAM STAIN: CPT | Performed by: ORTHOPAEDIC SURGERY

## 2022-12-23 PROCEDURE — 99233 SBSQ HOSP IP/OBS HIGH 50: CPT | Performed by: INTERNAL MEDICINE

## 2022-12-23 PROCEDURE — 272N000001 HC OR GENERAL SUPPLY STERILE: Performed by: ORTHOPAEDIC SURGERY

## 2022-12-23 PROCEDURE — 250N000009 HC RX 250: Performed by: NURSE ANESTHETIST, CERTIFIED REGISTERED

## 2022-12-23 RX ORDER — FENTANYL CITRATE 50 UG/ML
INJECTION, SOLUTION INTRAMUSCULAR; INTRAVENOUS PRN
Status: DISCONTINUED | OUTPATIENT
Start: 2022-12-23 | End: 2022-12-23

## 2022-12-23 RX ORDER — ONDANSETRON 4 MG/1
4 TABLET, ORALLY DISINTEGRATING ORAL EVERY 6 HOURS PRN
Status: DISCONTINUED | OUTPATIENT
Start: 2022-12-23 | End: 2022-12-29 | Stop reason: HOSPADM

## 2022-12-23 RX ORDER — FLUTICASONE PROPIONATE 50 MCG
1 SPRAY, SUSPENSION (ML) NASAL DAILY PRN
COMMUNITY
End: 2023-02-10

## 2022-12-23 RX ORDER — FENTANYL CITRATE 50 UG/ML
50 INJECTION, SOLUTION INTRAMUSCULAR; INTRAVENOUS EVERY 5 MIN PRN
Status: DISCONTINUED | OUTPATIENT
Start: 2022-12-23 | End: 2022-12-23 | Stop reason: HOSPADM

## 2022-12-23 RX ORDER — NALOXONE HYDROCHLORIDE 0.4 MG/ML
0.4 INJECTION, SOLUTION INTRAMUSCULAR; INTRAVENOUS; SUBCUTANEOUS
Status: DISCONTINUED | OUTPATIENT
Start: 2022-12-23 | End: 2022-12-29 | Stop reason: HOSPADM

## 2022-12-23 RX ORDER — POLYETHYLENE GLYCOL 3350 17 G/17G
17 POWDER, FOR SOLUTION ORAL DAILY
Status: DISCONTINUED | OUTPATIENT
Start: 2022-12-24 | End: 2022-12-29 | Stop reason: HOSPADM

## 2022-12-23 RX ORDER — HYDROMORPHONE HCL IN WATER/PF 6 MG/30 ML
0.2 PATIENT CONTROLLED ANALGESIA SYRINGE INTRAVENOUS EVERY 5 MIN PRN
Status: DISCONTINUED | OUTPATIENT
Start: 2022-12-23 | End: 2022-12-23 | Stop reason: HOSPADM

## 2022-12-23 RX ORDER — LATANOPROST 50 UG/ML
1 SOLUTION/ DROPS OPHTHALMIC AT BEDTIME
Status: DISCONTINUED | OUTPATIENT
Start: 2022-12-23 | End: 2022-12-29 | Stop reason: HOSPADM

## 2022-12-23 RX ORDER — DEXTROSE MONOHYDRATE 25 G/50ML
25-50 INJECTION, SOLUTION INTRAVENOUS
Status: DISCONTINUED | OUTPATIENT
Start: 2022-12-23 | End: 2022-12-24

## 2022-12-23 RX ORDER — SIMVASTATIN 20 MG
20 TABLET ORAL AT BEDTIME
Status: DISCONTINUED | OUTPATIENT
Start: 2022-12-23 | End: 2022-12-29 | Stop reason: HOSPADM

## 2022-12-23 RX ORDER — MAGNESIUM HYDROXIDE 1200 MG/15ML
LIQUID ORAL PRN
Status: DISCONTINUED | OUTPATIENT
Start: 2022-12-23 | End: 2022-12-23 | Stop reason: HOSPADM

## 2022-12-23 RX ORDER — LABETALOL HYDROCHLORIDE 5 MG/ML
10 INJECTION, SOLUTION INTRAVENOUS EVERY 10 MIN PRN
Status: DISCONTINUED | OUTPATIENT
Start: 2022-12-23 | End: 2022-12-23 | Stop reason: HOSPADM

## 2022-12-23 RX ORDER — BUPROPION HYDROCHLORIDE 150 MG/1
150 TABLET ORAL EVERY EVENING
Status: ON HOLD | COMMUNITY
End: 2023-01-08

## 2022-12-23 RX ORDER — ACETAMINOPHEN 325 MG/1
975 TABLET ORAL EVERY 8 HOURS
Status: COMPLETED | OUTPATIENT
Start: 2022-12-23 | End: 2022-12-26

## 2022-12-23 RX ORDER — PANTOPRAZOLE SODIUM 40 MG/1
40 TABLET, DELAYED RELEASE ORAL DAILY PRN
COMMUNITY
End: 2024-03-13

## 2022-12-23 RX ORDER — FERROUS SULFATE 325(65) MG
325 TABLET ORAL
Status: DISCONTINUED | OUTPATIENT
Start: 2022-12-24 | End: 2022-12-29 | Stop reason: HOSPADM

## 2022-12-23 RX ORDER — ALBUTEROL SULFATE 0.83 MG/ML
2.5 SOLUTION RESPIRATORY (INHALATION)
Status: DISCONTINUED | OUTPATIENT
Start: 2022-12-23 | End: 2022-12-23 | Stop reason: HOSPADM

## 2022-12-23 RX ORDER — LOSARTAN POTASSIUM 100 MG/1
100 TABLET ORAL DAILY
Status: DISCONTINUED | OUTPATIENT
Start: 2022-12-23 | End: 2022-12-29 | Stop reason: HOSPADM

## 2022-12-23 RX ORDER — NICOTINE POLACRILEX 4 MG
15-30 LOZENGE BUCCAL
Status: DISCONTINUED | OUTPATIENT
Start: 2022-12-23 | End: 2022-12-24

## 2022-12-23 RX ORDER — PANTOPRAZOLE SODIUM 40 MG/1
40 TABLET, DELAYED RELEASE ORAL DAILY PRN
Status: DISCONTINUED | OUTPATIENT
Start: 2022-12-23 | End: 2022-12-29 | Stop reason: HOSPADM

## 2022-12-23 RX ORDER — FAMOTIDINE 20 MG/1
40 TABLET, FILM COATED ORAL DAILY
Status: DISCONTINUED | OUTPATIENT
Start: 2022-12-23 | End: 2022-12-29 | Stop reason: HOSPADM

## 2022-12-23 RX ORDER — MEPERIDINE HYDROCHLORIDE 25 MG/ML
25 INJECTION INTRAMUSCULAR; INTRAVENOUS; SUBCUTANEOUS
Status: DISCONTINUED | OUTPATIENT
Start: 2022-12-23 | End: 2022-12-23 | Stop reason: HOSPADM

## 2022-12-23 RX ORDER — CYANOCOBALAMIN (VITAMIN B-12) 500 MCG
250 TABLET ORAL DAILY
Status: DISCONTINUED | OUTPATIENT
Start: 2022-12-23 | End: 2022-12-29 | Stop reason: HOSPADM

## 2022-12-23 RX ORDER — HYDROMORPHONE HCL IN WATER/PF 6 MG/30 ML
0.4 PATIENT CONTROLLED ANALGESIA SYRINGE INTRAVENOUS
Status: DISCONTINUED | OUTPATIENT
Start: 2022-12-23 | End: 2022-12-29 | Stop reason: HOSPADM

## 2022-12-23 RX ORDER — NALOXONE HYDROCHLORIDE 0.4 MG/ML
0.2 INJECTION, SOLUTION INTRAMUSCULAR; INTRAVENOUS; SUBCUTANEOUS
Status: DISCONTINUED | OUTPATIENT
Start: 2022-12-23 | End: 2022-12-29 | Stop reason: HOSPADM

## 2022-12-23 RX ORDER — GLIPIZIDE 10 MG/1
20 TABLET, FILM COATED, EXTENDED RELEASE ORAL DAILY
Status: DISCONTINUED | OUTPATIENT
Start: 2022-12-23 | End: 2022-12-29 | Stop reason: HOSPADM

## 2022-12-23 RX ORDER — ONDANSETRON 4 MG/1
4 TABLET, ORALLY DISINTEGRATING ORAL AT BEDTIME
Status: DISCONTINUED | OUTPATIENT
Start: 2022-12-23 | End: 2022-12-29 | Stop reason: HOSPADM

## 2022-12-23 RX ORDER — ONDANSETRON 4 MG/1
4 TABLET, ORALLY DISINTEGRATING ORAL AT BEDTIME
COMMUNITY
End: 2023-06-01

## 2022-12-23 RX ORDER — LIDOCAINE 40 MG/G
CREAM TOPICAL
Status: DISCONTINUED | OUTPATIENT
Start: 2022-12-23 | End: 2022-12-29 | Stop reason: HOSPADM

## 2022-12-23 RX ORDER — BUPROPION HYDROCHLORIDE 150 MG/1
150 TABLET ORAL EVERY EVENING
Status: DISCONTINUED | OUTPATIENT
Start: 2022-12-23 | End: 2022-12-29 | Stop reason: HOSPADM

## 2022-12-23 RX ORDER — OXYCODONE HYDROCHLORIDE 5 MG/1
10 TABLET ORAL EVERY 4 HOURS PRN
Status: DISCONTINUED | OUTPATIENT
Start: 2022-12-23 | End: 2022-12-29 | Stop reason: HOSPADM

## 2022-12-23 RX ORDER — CEFAZOLIN SODIUM/WATER 2 G/20 ML
2 SYRINGE (ML) INTRAVENOUS
Status: COMPLETED | OUTPATIENT
Start: 2022-12-23 | End: 2022-12-23

## 2022-12-23 RX ORDER — ONDANSETRON 2 MG/ML
4 INJECTION INTRAMUSCULAR; INTRAVENOUS EVERY 30 MIN PRN
Status: DISCONTINUED | OUTPATIENT
Start: 2022-12-23 | End: 2022-12-23 | Stop reason: HOSPADM

## 2022-12-23 RX ORDER — PROCHLORPERAZINE MALEATE 5 MG
10 TABLET ORAL EVERY 6 HOURS PRN
Status: DISCONTINUED | OUTPATIENT
Start: 2022-12-23 | End: 2022-12-29 | Stop reason: HOSPADM

## 2022-12-23 RX ORDER — HYDROMORPHONE HCL IN WATER/PF 6 MG/30 ML
0.4 PATIENT CONTROLLED ANALGESIA SYRINGE INTRAVENOUS EVERY 5 MIN PRN
Status: DISCONTINUED | OUTPATIENT
Start: 2022-12-23 | End: 2022-12-23 | Stop reason: HOSPADM

## 2022-12-23 RX ORDER — FENTANYL CITRATE 50 UG/ML
25 INJECTION, SOLUTION INTRAMUSCULAR; INTRAVENOUS EVERY 5 MIN PRN
Status: DISCONTINUED | OUTPATIENT
Start: 2022-12-23 | End: 2022-12-23 | Stop reason: HOSPADM

## 2022-12-23 RX ORDER — SODIUM CHLORIDE, SODIUM LACTATE, POTASSIUM CHLORIDE, CALCIUM CHLORIDE 600; 310; 30; 20 MG/100ML; MG/100ML; MG/100ML; MG/100ML
INJECTION, SOLUTION INTRAVENOUS CONTINUOUS
Status: DISCONTINUED | OUTPATIENT
Start: 2022-12-23 | End: 2022-12-23

## 2022-12-23 RX ORDER — ONDANSETRON 4 MG/1
4 TABLET, ORALLY DISINTEGRATING ORAL EVERY 30 MIN PRN
Status: DISCONTINUED | OUTPATIENT
Start: 2022-12-23 | End: 2022-12-23 | Stop reason: HOSPADM

## 2022-12-23 RX ORDER — HYDROMORPHONE HCL IN WATER/PF 6 MG/30 ML
0.2 PATIENT CONTROLLED ANALGESIA SYRINGE INTRAVENOUS
Status: DISCONTINUED | OUTPATIENT
Start: 2022-12-23 | End: 2022-12-29 | Stop reason: HOSPADM

## 2022-12-23 RX ORDER — LIDOCAINE HYDROCHLORIDE 10 MG/ML
INJECTION, SOLUTION INFILTRATION; PERINEURAL PRN
Status: DISCONTINUED | OUTPATIENT
Start: 2022-12-23 | End: 2022-12-23

## 2022-12-23 RX ORDER — SODIUM CHLORIDE, SODIUM LACTATE, POTASSIUM CHLORIDE, CALCIUM CHLORIDE 600; 310; 30; 20 MG/100ML; MG/100ML; MG/100ML; MG/100ML
INJECTION, SOLUTION INTRAVENOUS CONTINUOUS PRN
Status: DISCONTINUED | OUTPATIENT
Start: 2022-12-23 | End: 2022-12-23

## 2022-12-23 RX ORDER — ONDANSETRON 2 MG/ML
4 INJECTION INTRAMUSCULAR; INTRAVENOUS EVERY 6 HOURS PRN
Status: DISCONTINUED | OUTPATIENT
Start: 2022-12-23 | End: 2022-12-29 | Stop reason: HOSPADM

## 2022-12-23 RX ORDER — BUPIVACAINE HYDROCHLORIDE 5 MG/ML
INJECTION, SOLUTION EPIDURAL; INTRACAUDAL
Status: COMPLETED | OUTPATIENT
Start: 2022-12-23 | End: 2022-12-23

## 2022-12-23 RX ORDER — OXYCODONE HYDROCHLORIDE 5 MG/1
5 TABLET ORAL EVERY 4 HOURS PRN
Status: DISCONTINUED | OUTPATIENT
Start: 2022-12-23 | End: 2022-12-29 | Stop reason: HOSPADM

## 2022-12-23 RX ORDER — CITALOPRAM HYDROBROMIDE 20 MG/1
40 TABLET ORAL DAILY
Status: DISCONTINUED | OUTPATIENT
Start: 2022-12-23 | End: 2022-12-29 | Stop reason: HOSPADM

## 2022-12-23 RX ORDER — VITAMIN B COMPLEX
1000 TABLET ORAL DAILY
Status: DISCONTINUED | OUTPATIENT
Start: 2022-12-23 | End: 2022-12-29 | Stop reason: HOSPADM

## 2022-12-23 RX ORDER — FLUTICASONE PROPIONATE 50 MCG
1 SPRAY, SUSPENSION (ML) NASAL DAILY PRN
Status: DISCONTINUED | OUTPATIENT
Start: 2022-12-23 | End: 2022-12-29 | Stop reason: HOSPADM

## 2022-12-23 RX ORDER — CEFAZOLIN SODIUM/WATER 2 G/20 ML
2 SYRINGE (ML) INTRAVENOUS SEE ADMIN INSTRUCTIONS
Status: DISCONTINUED | OUTPATIENT
Start: 2022-12-23 | End: 2022-12-23 | Stop reason: HOSPADM

## 2022-12-23 RX ORDER — IBUPROFEN 200 MG
400 TABLET ORAL EVERY 6 HOURS PRN
Status: ON HOLD | COMMUNITY
End: 2024-05-18

## 2022-12-23 RX ORDER — PROPOFOL 10 MG/ML
INJECTION, EMULSION INTRAVENOUS CONTINUOUS PRN
Status: DISCONTINUED | OUTPATIENT
Start: 2022-12-23 | End: 2022-12-23

## 2022-12-23 RX ORDER — BISACODYL 10 MG
10 SUPPOSITORY, RECTAL RECTAL DAILY PRN
Status: DISCONTINUED | OUTPATIENT
Start: 2022-12-23 | End: 2022-12-29 | Stop reason: HOSPADM

## 2022-12-23 RX ORDER — FENTANYL CITRATE 50 UG/ML
25 INJECTION, SOLUTION INTRAMUSCULAR; INTRAVENOUS
Status: DISCONTINUED | OUTPATIENT
Start: 2022-12-23 | End: 2022-12-23 | Stop reason: HOSPADM

## 2022-12-23 RX ORDER — AMOXICILLIN 250 MG
1 CAPSULE ORAL 2 TIMES DAILY
Status: DISCONTINUED | OUTPATIENT
Start: 2022-12-23 | End: 2022-12-29 | Stop reason: HOSPADM

## 2022-12-23 RX ORDER — ACETAMINOPHEN 325 MG/1
650 TABLET ORAL EVERY 4 HOURS PRN
Status: DISCONTINUED | OUTPATIENT
Start: 2022-12-26 | End: 2022-12-29 | Stop reason: HOSPADM

## 2022-12-23 RX ORDER — ATENOLOL 25 MG/1
25 TABLET ORAL DAILY
Status: DISCONTINUED | OUTPATIENT
Start: 2022-12-23 | End: 2022-12-29 | Stop reason: HOSPADM

## 2022-12-23 RX ADMIN — INSULIN GLARGINE 15 UNITS: 100 INJECTION, SOLUTION SUBCUTANEOUS at 21:26

## 2022-12-23 RX ADMIN — Medication 1 TABLET: at 12:32

## 2022-12-23 RX ADMIN — CEFTRIAXONE 2 G: 2 INJECTION, POWDER, FOR SOLUTION INTRAMUSCULAR; INTRAVENOUS at 01:53

## 2022-12-23 RX ADMIN — BUPIVACAINE HYDROCHLORIDE 20 ML: 5 INJECTION, SOLUTION EPIDURAL; INTRACAUDAL at 14:41

## 2022-12-23 RX ADMIN — BUPROPION HYDROCHLORIDE 150 MG: 150 TABLET, EXTENDED RELEASE ORAL at 21:28

## 2022-12-23 RX ADMIN — SODIUM CHLORIDE, SODIUM LACTATE, POTASSIUM CHLORIDE, CALCIUM CHLORIDE: 600; 310; 30; 20 INJECTION, SOLUTION INTRAVENOUS at 15:01

## 2022-12-23 RX ADMIN — SIMVASTATIN 20 MG: 20 TABLET, FILM COATED ORAL at 21:28

## 2022-12-23 RX ADMIN — SODIUM CHLORIDE, PRESERVATIVE FREE: 5 INJECTION INTRAVENOUS at 13:30

## 2022-12-23 RX ADMIN — PROPOFOL 100 MCG/KG/MIN: 10 INJECTION, EMULSION INTRAVENOUS at 15:05

## 2022-12-23 RX ADMIN — ACETAMINOPHEN 650 MG: 325 TABLET, FILM COATED ORAL at 12:31

## 2022-12-23 RX ADMIN — CEFTRIAXONE 2 G: 2 INJECTION, POWDER, FOR SOLUTION INTRAMUSCULAR; INTRAVENOUS at 23:54

## 2022-12-23 RX ADMIN — LOSARTAN POTASSIUM 100 MG: 100 TABLET, FILM COATED ORAL at 12:32

## 2022-12-23 RX ADMIN — MIDAZOLAM 2 MG: 1 INJECTION INTRAMUSCULAR; INTRAVENOUS at 15:01

## 2022-12-23 RX ADMIN — FENTANYL CITRATE 50 MCG: 50 INJECTION, SOLUTION INTRAMUSCULAR; INTRAVENOUS at 15:08

## 2022-12-23 RX ADMIN — INSULIN ASPART 1 UNITS: 100 INJECTION, SOLUTION INTRAVENOUS; SUBCUTANEOUS at 19:19

## 2022-12-23 RX ADMIN — Medication 1000 UNITS: at 12:32

## 2022-12-23 RX ADMIN — LIDOCAINE HYDROCHLORIDE 50 MG: 10 INJECTION, SOLUTION INFILTRATION; PERINEURAL at 15:08

## 2022-12-23 RX ADMIN — INSULIN ASPART 1 UNITS: 100 INJECTION, SOLUTION INTRAVENOUS; SUBCUTANEOUS at 12:29

## 2022-12-23 RX ADMIN — ONDANSETRON 4 MG: 2 INJECTION INTRAMUSCULAR; INTRAVENOUS at 15:33

## 2022-12-23 RX ADMIN — SODIUM CHLORIDE: 9 INJECTION, SOLUTION INTRAVENOUS at 01:54

## 2022-12-23 RX ADMIN — VANCOMYCIN HYDROCHLORIDE 1000 MG: 1 INJECTION, SOLUTION INTRAVENOUS at 21:36

## 2022-12-23 RX ADMIN — INSULIN ASPART 3 UNITS: 100 INJECTION, SOLUTION INTRAVENOUS; SUBCUTANEOUS at 23:12

## 2022-12-23 RX ADMIN — LATANOPROST 1 DROP: 50 SOLUTION/ DROPS OPHTHALMIC at 21:27

## 2022-12-23 RX ADMIN — CITALOPRAM HYDROBROMIDE 40 MG: 20 TABLET ORAL at 12:32

## 2022-12-23 RX ADMIN — LEVOTHYROXINE SODIUM 137 MCG: 0.11 TABLET ORAL at 12:32

## 2022-12-23 RX ADMIN — OXYCODONE HYDROCHLORIDE 2.5 MG: 5 TABLET ORAL at 01:39

## 2022-12-23 RX ADMIN — Medication 2 G: at 14:58

## 2022-12-23 ASSESSMENT — ACTIVITIES OF DAILY LIVING (ADL)
WEAR_GLASSES_OR_BLIND: YES
FALL_HISTORY_WITHIN_LAST_SIX_MONTHS: NO
CHANGE_IN_FUNCTIONAL_STATUS_SINCE_ONSET_OF_CURRENT_ILLNESS/INJURY: YES
ADLS_ACUITY_SCORE: 27
ADLS_ACUITY_SCORE: 27
CONCENTRATING,_REMEMBERING_OR_MAKING_DECISIONS_DIFFICULTY: YES
ADLS_ACUITY_SCORE: 27
ADLS_ACUITY_SCORE: 23
WALKING_OR_CLIMBING_STAIRS_DIFFICULTY: YES
ADLS_ACUITY_SCORE: 27
TRANSFERRING: 1-->ASSISTANCE (EQUIPMENT/PERSON) NEEDED
WALKING_OR_CLIMBING_STAIRS: AMBULATION DIFFICULTY, REQUIRES EQUIPMENT
HEARING_DIFFICULTY_OR_DEAF: NO
DIFFICULTY_COMMUNICATING: NO
DIFFICULTY_EATING/SWALLOWING: NO
ADLS_ACUITY_SCORE: 25
EQUIPMENT_CURRENTLY_USED_AT_HOME: CANE, STRAIGHT
ADLS_ACUITY_SCORE: 27
VISION_MANAGEMENT: GLASSES
ADLS_ACUITY_SCORE: 27
DOING_ERRANDS_INDEPENDENTLY_DIFFICULTY: NO
TRANSFERRING: 0-->ASSISTANCE NEEDED (DEVELOPMENTALLY APPROPRIATE)
ADLS_ACUITY_SCORE: 35
DRESSING/BATHING_DIFFICULTY: NO
TOILETING_ISSUES: NO

## 2022-12-23 NOTE — PROGRESS NOTES
----- Message from Sejal Taylor sent at 12/1/2021  3:27 PM CST -----  Regarding: order change needed  Kirstin,    Mr. Herring has a CT WO W contrast scheduled for tomorrow, 12/2 and in the order comments it states WO contrast only. We nee d anew order for a CT Chest WO contrast placed.      Thanks,  Sejal [facility]   [phone]     Care Management Discharge Note    Discharge Date: 12/24/2022       Discharge Disposition:  Home    Handoff Referral Completed: Yes    Additional Information:  Pt identified as a Service Bundle #3 and an unplanned readmission risk. No anticipated discharge needs or assessment needed at this time.  Please consult CM/SW  if discharge needs should arise.      Cara Garcia, RN      Cara Garcia RN Case Manager  Inpatient Care Coordination  [facility]   [phone]

## 2022-12-23 NOTE — CONSULTS
Consult Date: 12/23/2022    INFECTIOUS DISEASE CONSULTATION    LOCATION:  [facility], room 527.    REFERRING PHYSICIAN:  Delio Vela MD    IMPRESSION:    1.  A 62-year-old male with chronic right third finger distal ulcer, underlying neuropathy, unclear vascular status, ongoing wound care without perceived infection.  Then, in the last several days, worsening finger overall, proximal involvement into the hand, has osteomyelitis on imaging and finger threatening infection.  2.  Mild clinical sepsis, blood cultures pending.  3.  History of chronic and recurrent hand and foot infections and wounds, followed in the wound care center.  Has an ongoing left hand wound as well.  Feet relatively stable currently.  4.  History of methicillin-resistant Staphylococcus aureus, including from fingers.  5.  Diabetes mellitus.  6.  Chronic kidney disease, baseline creatinine in the 1.5 range and there currently.  7.  Ongoing cough for the last 2 months.  Given antibiotics recently, but nothing for bacterial pneumonia and unlikely to be the case, probably post-COVID type cough, some fluid present so possibly cardiac.    RECOMMENDATIONS:    1.  Vancomycin and ceftriaxone started.  Continue for now.  Await cultures from the hand and blood cultures.  2.  Amount and type of antibiotics will depend on orthopedic approach, looks quite finger threatening and problematic at this point.  3.  Discussed in detail with the patient.  4.  Try to limit the amount of vancomycin and follow creatinine closely, but does have a history of MRSA, including from the finger, so for now, vancomycin.  Use alternative agent if renal function worsens.    HISTORY OF PRESENT ILLNESS:  This 62-year-old male is seen in consultation.  He has a history of chronic diabetes and with it, wounds on both hands and feet over time.  He has never had any amputations, but has had some courses of antibiotics and in the past, has actually had MRSA and hand  infection as far back as 2019.  Over the last several months, he has had an ongoing third digit distal ulceration seen at the wound care center.  Not really perceived to be infected.  Not getting antibiotics.  Of note, he recently was placed on antibiotics for a different reason, which is he had COVID-19 in September.  Initially, he did well and then has developed a chronic cough for the last 2 months.  Imaging showed mostly some atelectasis and some fluid, but was put on oral antibiotics, which he has not even started, but obviously unlikely this is bacterial pneumonia.  The cough is minor and not hypoxic.  The finger progressively worsened over the last several days to the point where there is now proximal involvement into the hand.  Imaging suggests osteomyelitis.  He has some mild fever symptoms and blood cultures are pending.    PAST MEDICAL HISTORY:  Recurrent and ongoing hand wounds and foot wounds, none with amputations previously.  Sometimes with infection and antibiotics, although of note, no antibiotics for the fingers or hands in several months.  MRSA in the past, but not recently.  History of diabetes, control level unclear.  History of chronic kidney disease with creatinine in the 1.3 to 1.5 range.    SOCIAL AND FAMILY HISTORY:  No recent travel exposures.  Has the known MRSA.  No family history relevant to the current illness.  No other recent exposures.  Had the COVID-19 in September with recovery.    MEDICATIONS:  As listed.    ALLERGIES:  NONE.    REVIEW OF SYSTEMS:  Some discomfort in the hand, but not particularly significant because he has severe neuropathy of both hands and feet.  No significant other focal site.  Some degree of systemic symptoms, but mostly feels well.  Ongoing chronic cough, nonproductive, without shortness of breath.    PHYSICAL EXAMINATION:    GENERAL:  The patient appears his stated age, does not look particularly toxic or ill.  VITAL SIGNS:  His vital signs are currently  normal, including being afebrile.  HEENT:  No thrush or intraoral lesion.  Pupils reactive.  NECK:  Supple and nontender.  HEART:  Slight murmur, but significant.  Not tachycardic.  LUNGS:  Clear bilaterally.  A few crackles at the bases.  ABDOMEN:  Soft and nontender.  EXTREMITIES:  Feet without major active wounds.  Left hand with a couple small distal ulcers without signs of infection.  The right third digit is about twice normal size.  Looks like possible tenosynovitis more proximally, but unclear.  Does not really have any feeling so unclear how much pain.  Some inflammation all the way up into the hand.    LABORATORY:  Imaging suggests at least distal osteomyelitis.    LABORATORY DATA:  Blood cultures are pending.  Creatinine at about his baseline in the 1.3 to 1.5 range.    Thank you very much for the consultation.  I will follow the patient with you.    Hebert Preciado MD        D: 2022   T: 2022   MT: EV    Name:     ALBERT HUFFMANJavier  MRN:      [id number]        Account:      [id number]   :      1960           Consult Date: 2022     Document: [id number]

## 2022-12-23 NOTE — PROGRESS NOTES
[facility]                                                                                   OUTPATIENT PHYSICAL THERAPY FUNCTIONAL EVALUATION  PLAN OF TREATMENT FOR OUTPATIENT REHABILITATION  (COMPLETE FOR INITIAL CLAIMS ONLY)  Patient's Last Name, First Name, M.I.  YOB: 1960  Steve Morgan     Provider's Name   [facility]   Medical Record No.  [id number]     Start of Care Date:  12/12/22   Onset Date:  01/01/12 (reports neuropathy in hands/feet for 10+ years and walking/balance progressively worse since then)   Type:     _X__PT   ____OT  ____SLP Medical Diagnosis:  Diabetic polyneuropathy associated with type 2 diabetes mellitus (H) (E11.42) ; PVD (peripheral vascular disease) (H) (I73.9); Unsteady gait (R26.81) ; Idiopathic peripheral neuropathy (G60.9)     PT Diagnosis:  sensory detection deficit with inc risk for falls Visits from SOC:  1                              __________________________________________________________________________________  Plan of Treatment/Functional Goals:              GOALS  HEP  Pt to demonstrate (I) and consistency with HEP for strengthening, postural and gait stability, and endurance exercises for progress towards all goals and motivation towards continued self maintenance following d/c from therapy for (I) exercise.  03/06/23    FALLS REPORT  Pt to report consistant use of appropriate AD and good judgement towards fall prevention throughout PT EOC  03/06/23    DYNAMIC BALANCE:  Pt to demonstrate maintained stability with don/doff coat and report improved stability with ADLs (bathing, dressing).  03/06/23    TRANSFERS/5x sit <> stand:  Pt to complete 5x sit <>  </= 30 sec without UE support and maintain stability to demonstrate improved LE strength and stability with transfers with progress towards dec risk for  falls.  03/06/23    GAIT SPEED:  Pt to demonstrate significantly improved comfortable gait speed with SPC to >/= 1.0 m/s towards dec risk for falls.  03/06/23             Therapy Frequency:  1 time/week   Predicted Duration of Therapy Intervention:  12 weeks    Veronica Moreno, PT                                    I CERTIFY THE NEED FOR THESE SERVICES FURNISHED UNDER        THIS PLAN OF TREATMENT AND WHILE UNDER MY CARE     (Physician co-signature of this document indicates review and certification of the therapy plan).                Certification Date From:  12/12/22   Certification Date To:  03/06/23    Referring Provider:  Lisa Pierre, ANASTASIA    Initial Assessment  See Epic Evaluation- Start of Care Date: 12/12/22
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
## 2022-12-23 NOTE — PROVIDER NOTIFICATION
MD paged: Pt having constant headache. Tylenol did not work. Could you order a stronger pain medication?    MD response:

## 2022-12-23 NOTE — PROGRESS NOTES
"   12/12/22 1400   Quick Adds   Quick Adds Certification   Type of Visit Initial OP PT Evaluation   General Information   Start of Care Date 12/12/22   Referring Physician Lisa Pierre PA-C   Orders Evaluate and Treat as Indicated   Order Date 10/04/22   Medical Diagnosis Diabetic polyneuropathy associated with type 2 diabetes mellitus (H) (E11.42) ; PVD (peripheral vascular disease) (H) (I73.9); Unsteady gait (R26.81) ; Idiopathic peripheral neuropathy (G60.9)   Onset of illness/injury or Date of Surgery 01/01/12  (reports neuropathy in hands/feet for 10+ years and walking/balance progressively worse since then)   Precautions/Limitations fall precautions   Surgical/Medical history reviewed Yes   Pertinent history of current problem (include personal factors and/or comorbidities that impact the POC) Steve \"Caio\" Cathy is a 61 yo M who arrives for OP PT evaluation of his balance/gait limitations d/t diabetic polyneuropathy associated wiht DM2. Pt was referred after f/u with his PCP, Lisa Pierre PA-C about 10 weeks ago. PER CHART REVIEW: Needs support of walker. Has been using cane, but this does not offer enough stability. Feels gait is worsening. Patient would benefit greatly for walker-. Patient having difficulty with walking/ambulation around home. He has difficulty toileting as well. Walker would help with raising off toilet and out of chair and allow him to ambulate at home safely. He has been able to safely use cane and will likely be even safer with walker. Orders placed for PT and Walker.   Prior level of function comment CURRENT FUNCTION:  BALANCE: Progressively worse x10 yrs. Cane isn't offering as much balance support now. Reports driveway slopes - needs to be very careful on driveway. AMBULATION: Arrives with SPC. Uses cane for the past 6-7 yrs outside the house, doesnt use in the house. Has 4ww with seat and breaks he uses for the past couple of months walking outdoors/longer distances - he " left this in the car today. Uses cart for support in grociery stores. FALLS: In the past year fell 2-3x. Tripped walking over uneven ground. Fell while on vacation in [facility] where he has a timeshare (going back in Feb, 2023). He was wearing water shoes and had cane in hand at the time. No injures from falls. Fell with injury 5-6 yrs ago - fell on R shoulder that healed. Has fallen walking in the yard - participating in lawn maintainance. EMPLOYMENT: retired, accounts payable. DAILY ACTIVITY: Sedentary lifesyle. HOBBIES: . LIVING ENVIRONMENT: in home with stairs, lives with spouse. COMMUNITY AMBULATION: Goes to restaurants and stores. EXERCISE: reports poor compliance with exercise in the past. His spouse uses DVD exercise videos - he has some interest in doing this. Has never had a gym membership. Has access to an eliptical at home - has some interest in this. Access to: a larger family room and stairs for walking exercise. May have access to handwts (2-5 lbs).   General Information Comments Pt arrives for PT evaluation today thinking he was here for a mental health visit - pt participates in virtual visits regularly for counseling for depression. He forgot about orders placed for PT, however he does want to improve walking and balance. SXS REPORT: SENSATION: neuropathy in feet and hands at least 10+ years - denies any recent significant changes. Neuropathy up to his shins and feels symetrical. WEAKNESS: BLE R>L. ROM: finger flexion bilat hands. PAIN: none. VISION: Corrective eyeware - reports good vision. FINE MOTOR: cannot hold pencil d/t neuropathy in hands. DIZZINESS: reports rarely gets lightheaded when sitting and watching TV. None laying or turning in bed. MEMORY: feels he has some memory issues. ). PRIOR PT: none for improving his walking.   Fall Risk Screen   Fall screen completed by PT   Have you fallen 2 or more times in the past year? Yes   Have you fallen and had an injury in the past  year? No   Is patient a fall risk? Yes;Department fall risk interventions implemented   Abuse Screen (yes response referral indicated)   Physical Signs of Abuse Present no   Pain   Patient currently in pain No   Cognitive Status Examination   Level of Consciousness alert   Follows Commands and Answers Questions 100% of the time   Personal Safety and Judgment intact   Memory impaired   Observation   Observation TRANSFERS/5x sit <> stand: 37.87 seconds, from standard height chair, preference for upper extremity support from armrests to rise and control lowering.  Without upper extremity support - demonstrates some posterior loss of balance into chair.  To maintain stability assumes wide base of support and readjusts right lower extremity to maintain balance. STATIC BALANCE: maintains wt on heels, wide BECK. Romberg: unable to assume romberg position both without and with upper extremity support from cane - demos multiple posterior steps to recover from posterior loss of balance. DYNAMIC BALANCE: donning coat - multiple posterior steps to recover from LOB. GAIT SPEED: comfortable pace: .72 m/s with SPC, fast pace: .89 m/s. (< 1 m/s = inc risk for falls). GAIT: maintains downward gaze, uncoordinated stepping, with SPC.   Clinical Impression   Criteria for Skilled Therapeutic Interventions Met yes, treatment indicated   PT Diagnosis sensory detection deficit with inc risk for falls   Influenced by the following impairments neuropathy, impaired transfers, static and dynamic balance, gait, gait speed, gait stability   Functional limitations due to impairments functional indoor/outdoor ambulation with inc risk for falls, participation in ADL/IADLs (ie walking down driveway to get main, lawn maintainance, travel out of country where he frequently vacations)   Clinical Presentation Stable/Uncomplicated   Clinical Decision Making (Complexity) Low complexity   Therapy Frequency 1 time/week   Predicted Duration of Therapy  Intervention (days/wks) 12 weeks   Risk & Benefits of therapy have been explained Yes   Patient, Family & other staff in agreement with plan of care Yes   Education Assessment   Barriers to Learning Physical;Cognitive   GOALS   PT [facility] Goals 1;2;3;4;5   Goal 1   Goal Identifier HEP   Goal Description Pt to demonstrate (I) and consistency with HEP for strengthening, postural and gait stability, and endurance exercises for progress towards all goals and motivation towards continued self maintenance following d/c from therapy for (I) exercise.   Goal Progress at [facility], 12/12/22: reports poor compliance with exercise in the past. Access to: His spouse uses DVD exercise videos - he has some interest in doing this. Has never had a gym membership. Has access to an eliptical at home - has some interest in this. A larger family room and stairs for walking exercise. May have access to handwts (2-5 lbs).   Target Date 03/06/23   Goal 2   Goal Identifier FALLS REPORT   Goal Description Pt to report consistant use of appropriate AD and good judgement towards fall prevention throughout PT EOC   Goal Progress at [facility], 12/12/22: In the past year fell 2-3x. Tripped walking over uneven ground. Fell while on vacation in [facility] where he has a timeshare (going back in Feb, 2023). He was wearing water shoes and had cane in hand at the time. No injures from falls. Has fallen walking in the yard - participating in lawn maintainance.   Target Date 03/06/23   Goal 3   Goal Identifier DYNAMIC BALANCE:   Goal Description Pt to demonstrate maintained stability with don/doff coat and report improved stability with ADLs (bathing, dressing).   Goal Progress at [facility], 12/12/22: donning coat - multiple posterior steps to recover from LOB.   Target Date 03/06/23   Goal 4   Goal Identifier TRANSFERS/5x sit <> stand:   Goal Description Pt to complete 5x sit <>  </= 30 sec without UE support and maintain stability to demonstrate improved LE  strength and stability with transfers with progress towards dec risk for falls.   Goal Progress at eval, 12/12/22: 37.87 seconds, from standard height chair, To maintain stability assumes wide base of support and UE WBing (> 12 sec without UE support = inc fall risk)   Target Date 03/06/23   Goal 5   Goal Identifier GAIT SPEED:   Goal Description Pt to demonstrate significantly improved comfortable gait speed with SPC to >/= 1.0 m/s towards dec risk for falls.   Goal Progress at eval, 12/12/22: comfortable pace: .72 m/s with SPC, fast pace: .89 m/s. (< 1 m/s = inc risk for falls). maintains downward gaze, uncoordinated stepping, with SPC.   Target Date 03/06/23   Total Evaluation Time   PT John, Low Complexity Minutes (54039) 37   Therapy Certification   Certification date from 12/12/22   Certification date to 03/06/23   Medical Diagnosis Diabetic polyneuropathy associated with type 2 diabetes mellitus (H) (E11.42) ; PVD (peripheral vascular disease) (H) (I73.9); Unsteady gait (R26.81) ; Idiopathic peripheral neuropathy (G60.9)

## 2022-12-23 NOTE — PLAN OF CARE
To Do:  End of Shift Summary  For vital signs and complete assessments, please see documentation flowsheets.     Pertinent assessments: BP elevated. AO. Denies SOB, nausea. On RA, O2 sats, 96%. Mentioned trouble urinating. C/o headache and cold sickness, Oxycodone given x1 (Tyl given in ED). LS clear, BS hypoactive. Wounds to fingers. R 3rd finger edema and erythema, wound. Numbness and tingling in hand and feet. Tremors in R and L hands. Ax1 w walker. Bed alarms on. NPO. Urine hesitancy, 1000ml output, 350 ml bladder scan. No straight cath order. Pt slept ok.  Major Shift Events: MD paged for stronger pain meds.   Treatment Plan: Monitor pain, O2sats, labs, BG. Continue IVF, ABX. NPO. Discharge TBD.     Bedside Nurse: Haley Reagan RN

## 2022-12-23 NOTE — ED NOTES
"[facility]  ED Nurse Handoff Report    Steve Morgan is a 62 year old male   ED Chief complaint: Shortness of Breath  . ED Diagnosis:   Final diagnoses:   Acute sepsis (H)   Cellulitis of finger of right hand   Finger osteomyelitis, right (H)   Hyponatremia     Allergies: No Known Allergies    Code Status: Full Code  Activity level - Baseline/Home:  Independent. Activity Level - Current:   Stand by Assist. Lift room needed: No. Bariatric: No   Needed: No   Isolation: Yes. Infection: Not Applicable  Other .     Vital Signs:   Vitals:    12/22/22 1628   BP: (!) 174/92   Pulse: 103   Resp: 20   Temp: 98.8  F (37.1  C)   TempSrc: Oral   SpO2: 97%   Weight: 113.4 kg (250 lb)   Height: 1.664 m (5' 5.5\")       Cardiac Rhythm:  ,      Pain level:    Patient confused: No. Patient Falls Risk: Yes.   RECEIVING UNIT ED HANDOFF REVIEW    Above ED Nurse Handoff Report was reviewed: Yes  Reviewed by: Haley Reagan RN on December 22, 2022 at 11:34 PM     Elimination Status: Has voided   Patient Report - Initial Complaint: SOB. Focused Assessment: Chief Complaint:  Shortness of Breath        The history is provided by the patient.      Steve Morgan is a 62 year old male with history of type 2 diabetes with diabetic neuropathy who presents with shortness of breath. The patient reports experiencing a dry cough since October of this year. He states that last week he was prescribed antibiotics for a possible sinus infection. He is diabetic and presents with wounds to his hands bilaterally. 2 days ago he developed swelling and redness to the middle finger of his right hand. He has been seen at a wound care facility. He denies fever.     Review of Systems   Constitutional: Negative for fever.   Respiratory: Positive for cough and shortness of breath.    Musculoskeletal: Positive for joint swelling.   Skin: Positive for color change and wound.   All other systems reviewed and are negative.   Tests " Performed: Labs, imaging. Abnormal Results:   Labs Ordered and Resulted from Time of ED Arrival to Time of ED Departure   COMPREHENSIVE METABOLIC PANEL - Abnormal       Result Value    Sodium 118 (*)     Potassium 4.7      Chloride 82 (*)     Carbon Dioxide (CO2) 22      Anion Gap 14      Urea Nitrogen 16.7      Creatinine 1.51 (*)     Calcium 8.3 (*)     Glucose 253 (*)     Alkaline Phosphatase 115      AST 36      ALT 38      Protein Total 7.7      Albumin 3.8      Bilirubin Total 0.8      GFR Estimate 51 (*)    CBC WITH PLATELETS AND DIFFERENTIAL - Abnormal    WBC Count 15.6 (*)     RBC Count 3.13 (*)     Hemoglobin 9.1 (*)     Hematocrit 26.9 (*)     MCV 86      MCH 29.1      MCHC 33.8      RDW 13.1      Platelet Count 298      % Neutrophils 85      % Lymphocytes 6      % Monocytes 8      % Eosinophils 0      % Basophils 0      % Immature Granulocytes 1      NRBCs per 100 WBC 0      Absolute Neutrophils 13.3 (*)     Absolute Lymphocytes 0.9      Absolute Monocytes 1.2      Absolute Eosinophils 0.0      Absolute Basophils 0.0      Absolute Immature Granulocytes 0.2      Absolute NRBCs 0.0     INFLUENZA A/B & SARS-COV2 PCR MULTIPLEX - Normal    Influenza A PCR Negative      Influenza B PCR Negative      RSV PCR Negative      SARS CoV2 PCR Negative     LACTIC ACID WHOLE BLOOD - Normal    Lactic Acid 0.9     BLOOD CULTURE   MRSA MSSA PCR, NASAL SWAB   BLOOD CULTURE     XR Finger Right G/E 2 Views   Final Result   IMPRESSION:       Marked soft tissue swelling throughout the right third finger. Severe flexion deformity and dislocation at the DIP joint, compatible with disruption of the common extensor tendon.       Erosion of the tuft of the distal phalanx is highly suspicious for osteomyelitis.       There is skin irregularity/ulceration in the tip of the finger, and there are a few small ossific fragments, which may be erosions related to osteomyelitis, or could represent an avulsion fracture related to prior tendon  disruption. No erosive change,    cortical defect, or focal lucency within the middle or proximal phalanges. The PIP and MCP joints are normally aligned.       Marked soft tissue swelling is also present throughout the dorsum of the hand. No other fracture or erosion. No other joint malalignment.         Chest XR,  PA & LAT   Final Result   IMPRESSION: Heart size is normal. Streaky opacities are present in both lung bases, atelectasis versus developing pneumonia. Follow-up advised.. Upper lobes are clear. No pneumothorax. No effusions. No pneumothorax.        .   Treatments provided: See MAR. Rocephin and vanco given. Frequent monitoring of pt.  Family Comments: N/a.  OBS brochure/video discussed/provided to patient:  No  ED Medications:   Medications   vancomycin (VANCOCIN) 2,250 mg in sodium chloride 0.9 % 500 mL intermittent infusion (has no administration in time range)   sodium chloride 0.9% infusion (has no administration in time range)   0.9% sodium chloride BOLUS (0 mLs Intravenous Stopped 12/22/22 2037)   piperacillin-tazobactam (ZOSYN) intermittent infusion 4.5 g (0 g Intravenous Stopped 12/22/22 1950)     Drips infusing:  No  For the majority of the shift, the patient's behavior Green. Interventions performed were N/a.    Sepsis treatment initiated: No     Patient tested for COVID 19 prior to admission: YES    ED Nurse Name/Phone Number: Karmen Meza RN,   8:59 PM

## 2022-12-23 NOTE — ANESTHESIA CARE TRANSFER NOTE
Patient: Steve Morgan    Procedure: Procedure(s):  Right middle finger irrigation and debridement with possible amputation       Diagnosis: Finger osteomyelitis, right (H) [M86.9]  Diagnosis Additional Information: No value filed.    Anesthesia Type:   Peripheral Nerve Block     Note:    Oropharynx: oropharynx clear of all foreign objects and spontaneously breathing  Level of Consciousness: awake  Oxygen Supplementation: face mask  Level of Supplemental Oxygen (L/min / FiO2): 8  Independent Airway: airway patency satisfactory and stable  Dentition: dentition unchanged  Vital Signs Stable: post-procedure vital signs reviewed and stable  Report to RN Given: handoff report given  Patient transferred to: Phase II  Comments: Pt to recovery.  Spontaneous respirations and exchanging well.  Pt making needs known.  Report given to RN.    Handoff Report: Identifed the Patient, Identified the Reponsible Provider, Reviewed the pertinent medical history, Discussed the surgical course, Reviewed Intra-OP anesthesia mangement and issues during anesthesia, Set expectations for post-procedure period and Allowed opportunity for questions and acknowledgement of understanding      Vitals:  Vitals Value Taken Time   /89 12/23/22 1538   Temp     Pulse 97 12/23/22 1542   Resp 9 12/23/22 1542   SpO2 100 % 12/23/22 1542   Vitals shown include unvalidated device data.    Electronically Signed By: BRIDGET Henao CRNA  December 23, 2022  3:43 PM

## 2022-12-23 NOTE — ANESTHESIA POSTPROCEDURE EVALUATION
Patient: Steve Morgan    Procedure: Procedure(s):  Right middle finger irrigation and debridement with possible amputation       Anesthesia Type:  Peripheral Nerve Block    Note:     Postop Pain Control: Uneventful            Sign Out: Well controlled pain   PONV: No   Neuro/Psych: Uneventful            Sign Out: Acceptable/Baseline neuro status   Airway/Respiratory: Uneventful            Sign Out: Acceptable/Baseline resp. status   CV/Hemodynamics: Uneventful            Sign Out: Acceptable CV status   Other NRE: NONE   DID A NON-ROUTINE EVENT OCCUR? No           Last vitals:  Vitals Value Taken Time   /85 12/23/22 1555   Temp 97.2  F (36.2  C) 12/23/22 1543   Pulse 87 12/23/22 1600   Resp 9 12/23/22 1600   SpO2 98 % 12/23/22 1600   Vitals shown include unvalidated device data.    Electronically Signed By: Ignacio Vazquez MD  December 23, 2022  4:01 PM  
5

## 2022-12-23 NOTE — ANESTHESIA PROCEDURE NOTES
"Brachial plexus Procedure Note    Pre-Procedure   Staff -        Anesthesiologist:  Ignacio Vazquez MD       Performed By: anesthesiologist       Location: pre-op       Procedure Start/Stop Times: 12/23/2022 2:32 PM and 12/23/2022 2:41 PM       Pre-Anesthestic Checklist: patient identified, IV checked, site marked, risks and benefits discussed, informed consent, monitors and equipment checked, pre-op evaluation, at physician/surgeon's request and post-op pain management  Timeout:       Correct Patient: Yes        Correct Procedure: Yes        Correct Site: Yes        Correct Position: Yes        Correct Laterality: Yes        Site Marked: Yes  Procedure Documentation  Procedure: Brachial plexus       Laterality: right       Patient Position: supine       Patient Prep/Sterile Barriers: sterile gloves, mask       Skin prep: Chloraprep       Local skin infiltrated with 0.6 mL of 1% lidocaine.  (axillary approach).       Needle Type: insulated       Needle Gauge: 22.        Needle Length (Inches): 2        Ultrasound guided       1. Ultrasound was used to identify targeted nerve, plexus, vascular marker, or fascial plane and place a needle adjacent to it in real-time.       2. Ultrasound was used to visualize the spread of anesthetic in close proximity to the above referenced structure.    Assessment/Narrative         The placement was negative for: blood aspirated, painful injection and site bleeding       Paresthesias: No.       Bolus given via needle. no blood aspirated via catheter.        Secured via.        Insertion/Infusion Method: Single Shot       Complications: none    Medication(s) Administered   Bupivacaine 0.5% PF (Infiltration) - Infiltration   20 mL - 12/23/2022 2:41:00 PM  Medication Administration Time: 12/23/2022 2:32 PM      FOR [facility] ([facility]) ONLY:   Pain Team Contact information: please page the Pain Team Via [employer]. Search \"Pain\". During daytime hours, please page the attending " first. At night please page the resident first.

## 2022-12-23 NOTE — BRIEF OP NOTE
[facility]    Brief Operative Note    Pre-operative diagnosis: Finger osteomyelitis, right (H) [M86.9]  Post-operative diagnosis Osteomyelitis with full thickness tissue necrosis    Procedure: Irrigation and debridement with a\partial amputation right long fingerSurgeon: Surgeon(s) and Role:     * Colby English MD - Primary  Anesthesia:  Block   Estimated Blood Loss: 5 mL from 12/23/2022  3:01 PM to 12/23/2022  3:36 PM      Drains:none  Specimens:   ID Type Source Tests Collected by Time Destination   A : right middle finger tissue culture Tissue Finger, Right ANAEROBIC BACTERIAL CULTURE ROUTINE, GRAM STAIN, AEROBIC BACTERIAL CULTURE ROUTINE Colby English MD 12/23/2022  3:18 PM    B : right middle finger swab Swab Finger, Right ANAEROBIC BACTERIAL CULTURE ROUTINE, GRAM STAIN, AEROBIC BACTERIAL CULTURE ROUTINE Colby English MD 12/23/2022  3:32 PM      Findings:  Large purulent collection around distal phalanx and DIP joint, necrotic skin tissue and bone, with poor perfusion requiring amputation at DIP  Complications: none.  Implants: none

## 2022-12-23 NOTE — PROGRESS NOTES
Writer contacted pharmacy regarding a couple medications that were not in pixus or coming up from pharm. Spoke with surgery and they did not want any more meds given prior to procedure. Told them when last medications were given & gave report. Pt was washed up with CHG bath & a new gown was placed on pt prior to being taken down to same day surgery.    clears

## 2022-12-23 NOTE — ANESTHESIA PREPROCEDURE EVALUATION
Anesthesia Pre-Procedure Evaluation    Patient: Steve Morgan   MRN: [id number] : 1960        Procedure : Procedure(s):  Right middle finger irrigation and debridement          Past Medical History:   Diagnosis Date     Cellulitis and abscess of trunk 2017     Depression      Hyperlipidemia LDL goal <100 10/31/2010     Hypertension goal BP (blood pressure) < 140/90 3/17/2011     Hypothyroidism 2010     Morbid obesity due to excess calories (H) 2010     Neuropathy in diabetes (H) 2010     Obesity 2010     Sleep apnea 2010    CPAP     Type 2 diabetes, HbA1C goal < 8% (H) 3/8/2011      Past Surgical History:   Procedure Laterality Date     BIOPSY       COLONOSCOPY       EYE SURGERY       GENITOURINARY SURGERY      surg for undescended testicle     REPAIR HAMMER TOE BILATERAL  2013    Procedure: REPAIR HAMMER TOE BILATERAL;  Flexor Tenotomy Toes 2,3,4,5 Bilateral Feet;  Surgeon: Saad Bangura DPM;  Location: [facility] OR      No Known Allergies   Social History     Tobacco Use     Smoking status: Never     Smokeless tobacco: Never   Substance Use Topics     Alcohol use: Yes     Comment: Occassionally      Wt Readings from Last 1 Encounters:   22 110.9 kg (244 lb 6.4 oz)        Anesthesia Evaluation   Pt has had prior anesthetic. Type: General.    No history of anesthetic complications       ROS/MED HX  ENT/Pulmonary:     (+) sleep apnea, uses CPAP,     Neurologic:  - neg neurologic ROS     Cardiovascular:     (+) hypertension-----    METS/Exercise Tolerance:     Hematologic:  - neg hematologic  ROS     Musculoskeletal: Comment: Infected middle finger      GI/Hepatic:     (+) GERD, Asymptomatic on medication, hiatal hernia,     Renal/Genitourinary:  - neg Renal ROS     Endo:  - neg endo ROS   (+) type II DM, thyroid problem, hypothyroidism, Obesity,     Psychiatric/Substance Use:  - neg psychiatric ROS     Infectious Disease: Comment: Infected middle finger       Malignancy:  - neg malignancy ROS     Other:  - neg other ROS          Physical Exam    Airway        Mallampati: II   TM distance: > 3 FB   Neck ROM: full   Mouth opening: > 3 cm    Respiratory Devices and Support         Dental  no notable dental history         Cardiovascular   cardiovascular exam normal          Pulmonary   pulmonary exam normal                OUTSIDE LABS:  CBC:   Lab Results   Component Value Date    WBC 11.9 (H) 12/23/2022    WBC 15.6 (H) 12/22/2022    HGB 8.5 (L) 12/23/2022    HGB 9.1 (L) 12/22/2022    HCT 26.2 (L) 12/23/2022    HCT 26.9 (L) 12/22/2022     12/23/2022     12/22/2022     BMP:   Lab Results   Component Value Date     (L) 12/23/2022     (L) 12/23/2022     (L) 12/23/2022    POTASSIUM 4.1 12/23/2022    POTASSIUM 4.7 12/22/2022    CHLORIDE 93 (L) 12/23/2022    CHLORIDE 82 (L) 12/22/2022    CO2 20 (L) 12/23/2022    CO2 22 12/22/2022    BUN 15.0 12/23/2022    BUN 16.7 12/22/2022    CR 1.36 (H) 12/23/2022    CR 1.51 (H) 12/22/2022     (H) 12/23/2022     (H) 12/23/2022     COAGS: No results found for: PTT, INR, FIBR  POC:   Lab Results   Component Value Date     (H) 05/16/2013     HEPATIC:   Lab Results   Component Value Date    ALBUMIN 3.8 12/22/2022    PROTTOTAL 7.7 12/22/2022    ALT 38 12/22/2022    AST 36 12/22/2022    ALKPHOS 115 12/22/2022    BILITOTAL 0.8 12/22/2022     OTHER:   Lab Results   Component Value Date    LACT 0.9 12/22/2022    A1C 9.2 (H) 09/01/2022    CHAR 8.0 (L) 12/23/2022    PHOS 3.5 06/26/2014    TSH 0.73 09/01/2022    T4 1.35 09/01/2022    .19 (H) 12/23/2022       Anesthesia Plan    ASA Status:  3   NPO Status:  NPO Appropriate    Anesthesia Type: Peripheral Nerve Block.              Consents    Anesthesia Plan(s) and associated risks, benefits, and realistic alternatives discussed. Questions answered and patient/representative(s) expressed understanding.    - Discussed:     - Discussed with:  Patient          Postoperative Care    Pain management: IV analgesics, Oral pain medications, Multi-modal analgesia.   PONV prophylaxis: Ondansetron (or other 5HT-3), Dexamethasone or Solumedrol     Comments:                Ignacio Vazquez MD

## 2022-12-23 NOTE — PROGRESS NOTES
[facility]  Hospitalist Progress Note  Hira Alamo M.D., M.B.A.   12/23/2022    Reason for Stay/active problem list      Severe hand infection with Osteomyelitis of right third finger    Hyponatremia     [place]          Assessment and Plan:        Summary of Stay:   Steve Morgan is a 62 year old male patient with past medical history of diabetes mellitus type 2, obstructive sleep apnea, diabetic neuropathy, morbid obesity, hypertension, hyperlipidemia, hypothyroidism, depression, who came to emergency room for evaluation for progressive cough, right hand wound and swelling. Patient stated that he has been having progressive cough since October. His cough got worse since Tuesday. He also stated that he had chronic wound of both hands due to neuropathy of his hands for which he follows with wound care nurse.  He stated that he developed swelling of right 3rd finger since 12/20/2022.  He developed erythema of right third finger day of admission.  He came to emergency room for evaluation.    On arrival to emergency room, his vital signs were checked and showed temperature 98.8, pulse 103, blood pressure 174/92, oxygen saturation 97% on room air.    Laboratory work-up showed sodium 118, potassium 4.7, creatinine 1.51, lactic acid 0.9, WBC 15.6, hemoglobin 9.1.    Chest x-ray showed streaky opacities in both lungs suspicious for atelectasis versus developing pneumonia.  X-ray of the right hand was done and showed marked soft tissue swelling throughout the right third finger, severe flexion deformity and suspicious for osteomyelitis.    In emergency room, he was given IV fluid.  He was also started on IV Zosyn and vancomycin.  He was admitted to the hospital for further management.         Problem List with Assessment and Plan:    Osteomyelitis of right third finger  History of diabetic neuropathy with hand deformities  Chronic hand wound  --Patient has multiple wounds on both hands with deformity  of bilateral fingers.  He has swelling and erythema of right third finger.  X-ray of the right hand concerning for osteomyelitis.  --Started on IV Zosyn and vancomycin in the ER which was continued with ceftriaxone and vancomycin   -- Orthopedic surgery was consulted   -- currently afebrile but cultures pending , will continue abx and follow progress and ortho input , will get ID input as well     Cough, rule out pneumonia  --He does have intermittent cough which worsened in last few days   --  Started on oral Augmentin as outpatient.  Still complains of cough.  Chest x-ray showed streaky opacities in both lungs which could represent atelectasis or pneumonia.  --Started on vancomycin and ceftriaxone which should also cover pneumonia     Hyponatremia  -- serum sodium 118 on arrival , was treated with saline   -- improving with sodium at 126 today , will continue IVF and monitor sodium     Acute kidney injury on chronic kidney disease  --Baseline creatinine around 1.01-1.08.  Creatinine 1.51 on admission , non oliguric   --improving with current creatinine of 1.36, will continue IVF , monitor kidney function      Diabetes mellitus, insulin requiring  --On Lantus insulin, metformin and glipizide.   --his  glipizide and metformin on hold , will continue lantus at 15 units at bedtime , sliding scale insulin     Hypertension:  -- Resumed PTA medication    Plan for today:    To OR today     NPO ,may eat after surgery     ID consult     Continue abx       VTE Prophylaxis: Pneumatic Compression Devices  Code Status: Full Code  Diet: NPO per Anesthesia Guidelines for Procedure/Surgery Except for: Meds    [place] Catheter: Not present    Family updated today: No unable to reach spouse      Disposition:  UTD , may need to stay over the weekend for surgery and antibiotic therapy.        Interval History (Subjective):        Patient is seen and examined by me today and medical record reviewed.Overnight events noted and care  "discussed with nursing staff.Patient care assumed. He denies severe pain or fever. No sob.                   Physical Exam:        Last Vital Signs:  BP (!) 166/92   Pulse 90   Temp 97  F (36.1  C) (Temporal)   Resp 16   Ht 1.651 m (5' 5\")   Wt 110.9 kg (244 lb 6.4 oz)   SpO2 98%   BMI 40.67 kg/m      I/O last 3 completed shifts:  In: -   Out: 1700 [Urine:1700]    Wt Readings from Last 5 Encounters:   12/23/22 110.9 kg (244 lb 6.4 oz)   06/15/22 111.1 kg (245 lb)   11/16/21 111.1 kg (245 lb)   09/24/21 109.3 kg (241 lb)   08/09/21 112 kg (247 lb)        Constitutional: Awake, alert, cooperative, no apparent distress     Respiratory: Clear to auscultation bilaterally, no crackles or wheezing   Cardiovascular: Regular rate and rhythm, normal S1 and S2, and no murmur noted   Abdomen: Normal bowel sounds, soft, non-distended, non-tender   Skin: No new rashes, no cyanosis, dry to touch   Neuro: Alert with  no new focal weakness   Extremities: Multiple joint deformity of both hands , swollen 3rd finger of the right hand and wound on medial aspect of the 3rd finger as well    Other(s):        All other systems: Negative          Medications:        All current medications were reviewed with changes reflected in problem list.         Data:      All new lab and imaging data was reviewed.      Data reviewed today: I reviewed all new labs and imaging results over the last 24 hours. I personally reviewed     Recent Labs   Lab 12/23/22  0805 12/22/22  1827   WBC 11.9* 15.6*   HGB 8.5* 9.1*   HCT 26.2* 26.9*   MCV 88 86    298     No results for input(s): CULT in the last 168 hours.  Recent Labs   Lab 12/23/22  1343 12/23/22  1228 12/23/22  0955 12/23/22  0906 12/23/22  0805 12/23/22  0146 12/23/22  0128 12/22/22  1827   NA  --   --  127*  --  127*  127*  --  121* 118*   POTASSIUM  --   --   --   --  4.1  --   --  4.7   CHLORIDE  --   --   --   --  93*  --   --  82*   CO2  --   --   --   --  20*  --   --  22 "   ANIONGAP  --   --   --   --  14  --   --  14   * 161*  --  175* 168*   < >  --  253*   BUN  --   --   --   --  15.0  --   --  16.7   CR  --   --   --   --  1.36*  --   --  1.51*   GFRESTIMATED  --   --   --   --  58*  --   --  51*   CHAR  --   --   --   --  8.0*  --   --  8.3*   PROTTOTAL  --   --   --   --   --   --   --  7.7   ALBUMIN  --   --   --   --   --   --   --  3.8   BILITOTAL  --   --   --   --   --   --   --  0.8   ALKPHOS  --   --   --   --   --   --   --  115   AST  --   --   --   --   --   --   --  36   ALT  --   --   --   --   --   --   --  38    < > = values in this interval not displayed.       Recent Labs   Lab 12/23/22  1343 12/23/22  1228 12/23/22  0906 12/23/22  0805 12/23/22  0555   * 161* 175* 168* 166*       No results for input(s): INR in the last 168 hours.      No results for input(s): TROPONIN, TROPI, TROPR in the last 168 hours.    Invalid input(s): TROP, TROPONINIES    Recent Results (from the past 48 hour(s))   Chest XR,  PA & LAT    Narrative    EXAM: XR CHEST 2 VIEWS  LOCATION: [facility]  DATE/TIME: 12/22/2022 4:55 PM    INDICATION: sob  COMPARISON: None.      Impression    IMPRESSION: Heart size is normal. Streaky opacities are present in both lung bases, atelectasis versus developing pneumonia. Follow-up advised.. Upper lobes are clear. No pneumothorax. No effusions. No pneumothorax.   XR Finger Right G/E 2 Views    Narrative    EXAM: XR FINGER RIGHT G/E 2 VIEWS  LOCATION: [facility]  DATE/TIME: 12/22/2022 7:21 PM    INDICATION: Right third finger pain and swelling.  COMPARISON: None.      Impression    IMPRESSION:     Marked soft tissue swelling throughout the right third finger. Severe flexion deformity and dislocation at the DIP joint, compatible with disruption of the common extensor tendon.     Erosion of the tuft of the distal phalanx is highly suspicious for osteomyelitis.     There is skin  irregularity/ulceration in the tip of the finger, and there are a few small ossific fragments, which may be erosions related to osteomyelitis, or could represent an avulsion fracture related to prior tendon disruption. No erosive change,   cortical defect, or focal lucency within the middle or proximal phalanges. The PIP and MCP joints are normally aligned.     Marked soft tissue swelling is also present throughout the dorsum of the hand. No other fracture or erosion. No other joint malalignment.         COVID Status:  COVID-19 PCR Results    COVID-19 PCR Results 12/22/22   SARS CoV2 PCR Negative      Comments are available for some flowsheets but are not being displayed.         COVID-19 Antibody Results, Testing for Immunity    COVID-19 Antibody Results, Testing for Immunity   No data to display.              Disclaimer: This note consists of symbols derived from keyboarding, dictation and/or voice recognition software. As a result, there may be errors in the script that have gone undetected. Please consider this when interpreting information found in this chart.

## 2022-12-23 NOTE — CONSULTS
[facility]    Orthopedic Consultation    Steve Morgan MRN# [id number]   Age: 62 year old YOB: 1960     Date of Admission: 12/22/2022    Reason for consult: Right middle finger infection       Requesting physician: Delio Vela MD       Level of consult: Consult, follow and place orders           Assessment and Plan:   Assessment:   Right middle finger osteomyelitis      Plan:   The patient's history and clinical/diagnostic findings were reviewed with the on-call orthopedic trauma hand surgeon. The patient is a candidate for a right middle finger I&D. He will undergo surgery later today pending surgical optimization by hospital team. Dr. English will discuss the risks, benefits, and outcomes of surgery while obtaining consent.     Surgeon: Dr. Colby English  Remain NPO.  NWB right hand pending postop recommendations.  Continue pain regimen.  IV vancomycin and ceftriaxone per ID pending intraoperative cultures.  Type and screen ordered.    Please contact orthopedic trauma team if any questions or concerns arise.           Chief Complaint:   Right middle finger infection         History of Present Illness:   Medical history obtained through chart review. I briefly spoke with the patient on his way to the preop area prior to right middle finger I&D. Steve Morgan is a 62 year old male with past medical history of T2DM, CKD, neuropathy, DAMIÁN, and obesity who was admitted for a right middle finger infection. The patient notes right middle finger swelling and pain since 12/20/22. He is unsure of trauma or activity preceding the onset. Due to developing redness yesterday, he presented to the [facility] ED yesterday. Here he was found to have WBC 15.6, lactic acid 0.9, and Hgb 9.1. Radiographs were significant for osteomyelitis of the distal right middle finger. The patient has a history of MRSA. No known antibiotic allergies. No prior surgeries to his right middle finger/           Past Medical History:     Past Medical History:   Diagnosis Date     Cellulitis and abscess of trunk 6/27/2017     Depression      Hyperlipidemia LDL goal <100 10/31/2010     Hypertension goal BP (blood pressure) < 140/90 3/17/2011     Hypothyroidism 1/12/2010     Morbid obesity due to excess calories (H) 1/12/2010     Neuropathy in diabetes (H) 1/12/2010     Obesity 1/12/2010     Sleep apnea 1/12/2010    CPAP     Type 2 diabetes, HbA1C goal < 8% (H) 3/8/2011             Past Surgical History:     Past Surgical History:   Procedure Laterality Date     BIOPSY       COLONOSCOPY       EYE SURGERY       GENITOURINARY SURGERY      surg for undescended testicle     REPAIR HAMMER TOE BILATERAL  5/16/2013    Procedure: REPAIR HAMMER TOE BILATERAL;  Flexor Tenotomy Toes 2,3,4,5 Bilateral Feet;  Surgeon: Saad Bangura DPM;  Location:  OR             Social History:     Social History     Tobacco Use     Smoking status: Never     Smokeless tobacco: Never   Substance Use Topics     Alcohol use: Yes     Comment: Occassionally             Family History:     Family History   Problem Relation Age of Onset     Breast Cancer Mother      Hypertension Mother      Thyroid Disease Mother      Depression Mother      Alzheimer Disease Mother 82     Cancer - colorectal Father      Thyroid Disease Father      Depression Father      Other - See Comments Father         bladder polyps     Heart Disease Maternal Grandmother         CHF     Circulatory Paternal Grandmother      Cancer Paternal Grandfather      Diabetes Brother      Diabetes Brother      Asthma Brother              Immunizations:     VACCINE/DOSE   Diptheria   DPT   DTAP   HBIG   Hepatitis A   Hepatitis B   HIB   Influenza   Measles   Meningococcal   MMR   Mumps   Pneumococcal   Polio   Rubella   Small Pox   TDAP   Varicella   Zoster             Allergies:   No Known Allergies          Medications:     Current Facility-Administered Medications   Medication      [Auto Hold] acetaminophen (TYLENOL) tablet 650 mg    Or     [Auto Hold] acetaminophen (TYLENOL) Suppository 650 mg     [Auto Hold] atenolol (TENORMIN) tablet 25 mg     [Auto Hold] buPROPion (WELLBUTRIN XL) 24 hr tablet 150 mg     [Auto Hold] calcium carbonate-vitamin D (OSCAL) 500-5 MG-MCG per tablet 1 tablet     ceFAZolin Sodium (ANCEF) injection 2 g     ceFAZolin Sodium (ANCEF) injection 2 g     [Auto Hold] cefTRIAXone (ROCEPHIN) 2 g vial to attach to  ml bag for ADULTS or NS 50 ml bag for PEDS     [Auto Hold] citalopram (celeXA) tablet 40 mg     [Auto Hold] cyanocobalamin (VITAMIN B-12) half-tab 250 mcg     [Auto Hold] glucose gel 15-30 g    Or     [Auto Hold] dextrose 50 % injection 25-50 mL    Or     [Auto Hold] glucagon injection 1 mg     [Auto Hold] famotidine (PEPCID) tablet 40 mg     [Auto Hold] ferrous sulfate (FEROSUL) tablet 325 mg     [Auto Hold] fluticasone (FLONASE) 50 MCG/ACT spray 1 spray     [Auto Hold] glipiZIDE (GLUCOTROL XL) 24 hr tablet 20 mg     [Auto Hold] HYDROmorphone (DILAUDID) injection 0.2 mg     [Auto Hold] insulin aspart (NovoLOG) injection (RAPID ACTING)     [Auto Hold] insulin glargine (LANTUS PEN) injection 15 Units     [Auto Hold] latanoprost (XALATAN) 0.005 % ophthalmic solution 1 drop     [Auto Hold] levothyroxine (SYNTHROID/LEVOTHROID) tablet 137 mcg     [Auto Hold] lidocaine (LMX4) cream     [Auto Hold] lidocaine 1 % 0.1-1 mL     [Auto Hold] losartan (COZAAR) tablet 100 mg     [Auto Hold] melatonin tablet 1 mg     [Auto Hold] naloxone (NARCAN) injection 0.2 mg    Or     [Auto Hold] naloxone (NARCAN) injection 0.4 mg    Or     [Auto Hold] naloxone (NARCAN) injection 0.2 mg    Or     [Auto Hold] naloxone (NARCAN) injection 0.4 mg     [Auto Hold] ondansetron (ZOFRAN ODT) ODT tab 4 mg     [Auto Hold] ondansetron (ZOFRAN ODT) ODT tab 4 mg    Or     [Auto Hold] ondansetron (ZOFRAN) injection 4 mg     [Auto Hold] oxyCODONE IR (ROXICODONE) half-tab 2.5-5 mg     [Auto Hold]  "pantoprazole (PROTONIX) EC tablet 40 mg     ropivacaine (NAROPIN) 300 mg, EPINEPHrine (ADRENALIN) 0.6 mg in sodium chloride 0.9 % 100 mL (ORTHO EDNA CUSTOM DOSE)     [Auto Hold] senna-docusate (SENOKOT-S/PERICOLACE) 8.6-50 MG per tablet 1 tablet    Or     [Auto Hold] senna-docusate (SENOKOT-S/PERICOLACE) 8.6-50 MG per tablet 2 tablet     [Auto Hold] simvastatin (ZOCOR) tablet 20 mg     [Auto Hold] sodium chloride (PF) 0.9% PF flush 3 mL     [Auto Hold] sodium chloride (PF) 0.9% PF flush 3 mL     sodium chloride 0.9% infusion     [Auto Hold] vancomycin (VANCOCIN) 1000 mg in dextrose 5% 200 mL PREMIX     [Auto Hold] Vitamin D3 (CHOLECALCIFEROL) tablet 1,000 Units             Review of Systems:   CV: NEGATIVE for chest pain, palpitations or peripheral edema  C: NEGATIVE for fever, chills, change in weight  E/M: NEGATIVE for ear, mouth and throat problems  R: NEGATIVE for significant cough or SOB          Physical Exam:   All vitals have been reviewed  Patient Vitals for the past 24 hrs:   BP Temp Temp src Pulse Resp SpO2 Height Weight   12/23/22 1347 (!) 166/92 97  F (36.1  C) Temporal 90 16 98 % -- --   12/23/22 0740 (!) 168/78 98.7  F (37.1  C) Oral 86 20 97 % -- --   12/23/22 0036 (!) 158/79 98.9  F (37.2  C) Oral 85 20 96 % 1.651 m (5' 5\") 110.9 kg (244 lb 6.4 oz)   12/23/22 0015 -- -- -- -- -- 97 % -- --   12/23/22 0010 (!) 160/86 -- -- 85 -- 97 % -- --   12/23/22 0005 -- -- -- -- -- 96 % -- --   12/23/22 0000 (!) 169/90 -- -- 81 -- 96 % -- --   12/22/22 2210 -- -- -- -- -- 98 % -- --   12/22/22 2205 -- -- -- -- -- 90 % -- --   12/22/22 2200 (!) 146/71 -- -- 93 -- 95 % -- --   12/22/22 1628 (!) 174/92 98.8  F (37.1  C) Oral 103 20 97 % 1.664 m (5' 5.5\") 113.4 kg (250 lb)       Intake/Output Summary (Last 24 hours) at 12/23/2022 1351  Last data filed at 12/23/2022 0516  Gross per 24 hour   Intake --   Output 1700 ml   Net -1700 ml       Constitutional: Pleasant, alert, appropriate, following commands. Non-toxic " appearing.  HEENT: Head atraumatic normocephalic. Pupils equal round and reactive.  Respiratory: Unlabored breathing no audible wheeze  Cardiovascular: Regular rate and rhythm per pulses.  GI: Abdomen is non-distended.  Lymph/Hematologic: No lymphadenopathy in areas examined.  Skin: No rashes, no cyanosis, no edema.  Musculoskeletal: Right middle finger: Exam limited as patient was being transported to preop. Diffuse erythema and swelling of the right middle finger. Multiple wounds of both hands with deformity of the fingers. Limited range of motion of all digits, particularly with flexion.   Neurologic: alert and oriented.          Data:   All laboratory data reviewed  Results for orders placed or performed during the hospital encounter of 12/22/22   Chest XR,  PA & LAT     Status: None    Narrative    EXAM: XR CHEST 2 VIEWS  LOCATION: [facility]  DATE/TIME: 12/22/2022 4:55 PM    INDICATION: sob  COMPARISON: None.      Impression    IMPRESSION: Heart size is normal. Streaky opacities are present in both lung bases, atelectasis versus developing pneumonia. Follow-up advised.. Upper lobes are clear. No pneumothorax. No effusions. No pneumothorax.   XR Finger Right G/E 2 Views     Status: None    Narrative    EXAM: XR FINGER RIGHT G/E 2 VIEWS  LOCATION: [facility]  DATE/TIME: 12/22/2022 7:21 PM    INDICATION: Right third finger pain and swelling.  COMPARISON: None.      Impression    IMPRESSION:     Marked soft tissue swelling throughout the right third finger. Severe flexion deformity and dislocation at the DIP joint, compatible with disruption of the common extensor tendon.     Erosion of the tuft of the distal phalanx is highly suspicious for osteomyelitis.     There is skin irregularity/ulceration in the tip of the finger, and there are a few small ossific fragments, which may be erosions related to osteomyelitis, or could represent an avulsion fracture related to  prior tendon disruption. No erosive change,   cortical defect, or focal lucency within the middle or proximal phalanges. The PIP and MCP joints are normally aligned.     Marked soft tissue swelling is also present throughout the dorsum of the hand. No other fracture or erosion. No other joint malalignment.     Symptomatic Influenza A/B & SARS-CoV2 (COVID-19) Virus PCR Multiplex Nasopharyngeal     Status: Normal    Specimen: Nasopharyngeal; Swab   Result Value Ref Range    Influenza A PCR Negative Negative    Influenza B PCR Negative Negative    RSV PCR Negative Negative    SARS CoV2 PCR Negative Negative    Narrative    Testing was performed using the Xpert Xpress CoV2/Flu/RSV Assay on the Cepheid GeneXpert Instrument. This test should be ordered for the detection of SARS-CoV-2 and influenza viruses in individuals who meet clinical and/or epidemiological criteria. Test performance is unknown in asymptomatic patients. This test is for in vitro diagnostic use under the FDA EUA for laboratories certified under CLIA to perform high or moderate complexity testing. This test has not been FDA cleared or approved. A negative result does not rule out the presence of PCR inhibitors in the specimen or target RNA in concentration below the limit of detection for the assay. If only one viral target is positive but coinfection with multiple targets is suspected, the sample should be re-tested with another FDA cleared, approved, or authorized test, if coinfection would change clinical management. This test was validated by the [facility]. These laboratories are certified under the Clinical Laboratory Improvement Amendments of 1988 (CLIA-88) as qualified to perform high complexity laboratory testing.   Comprehensive metabolic panel     Status: Abnormal   Result Value Ref Range    Sodium 118 (LL) 136 - 145 mmol/L    Potassium 4.7 3.4 - 5.3 mmol/L    Chloride 82 (L) 98 - 107 mmol/L    Carbon Dioxide (CO2) 22 22 -  29 mmol/L    Anion Gap 14 7 - 15 mmol/L    Urea Nitrogen 16.7 8.0 - 23.0 mg/dL    Creatinine 1.51 (H) 0.67 - 1.17 mg/dL    Calcium 8.3 (L) 8.8 - 10.2 mg/dL    Glucose 253 (H) 70 - 99 mg/dL    Alkaline Phosphatase 115 40 - 129 U/L    AST 36 10 - 50 U/L    ALT 38 10 - 50 U/L    Protein Total 7.7 6.4 - 8.3 g/dL    Albumin 3.8 3.5 - 5.2 g/dL    Bilirubin Total 0.8 <=1.2 mg/dL    GFR Estimate 51 (L) >60 mL/min/1.73m2   Lactic acid whole blood     Status: Normal   Result Value Ref Range    Lactic Acid 0.9 0.7 - 2.0 mmol/L   CBC with platelets and differential     Status: Abnormal   Result Value Ref Range    WBC Count 15.6 (H) 4.0 - 11.0 10e3/uL    RBC Count 3.13 (L) 4.40 - 5.90 10e6/uL    Hemoglobin 9.1 (L) 13.3 - 17.7 g/dL    Hematocrit 26.9 (L) 40.0 - 53.0 %    MCV 86 78 - 100 fL    MCH 29.1 26.5 - 33.0 pg    MCHC 33.8 31.5 - 36.5 g/dL    RDW 13.1 10.0 - 15.0 %    Platelet Count 298 150 - 450 10e3/uL    % Neutrophils 85 %    % Lymphocytes 6 %    % Monocytes 8 %    % Eosinophils 0 %    % Basophils 0 %    % Immature Granulocytes 1 %    NRBCs per 100 WBC 0 <1 /100    Absolute Neutrophils 13.3 (H) 1.6 - 8.3 10e3/uL    Absolute Lymphocytes 0.9 0.8 - 5.3 10e3/uL    Absolute Monocytes 1.2 0.0 - 1.3 10e3/uL    Absolute Eosinophils 0.0 0.0 - 0.7 10e3/uL    Absolute Basophils 0.0 0.0 - 0.2 10e3/uL    Absolute Immature Granulocytes 0.2 <=0.4 10e3/uL    Absolute NRBCs 0.0 10e3/uL   Sodium     Status: Abnormal   Result Value Ref Range    Sodium 121 (L) 136 - 145 mmol/L   Sodium     Status: Abnormal   Result Value Ref Range    Sodium 127 (L) 136 - 145 mmol/L   Glucose by meter     Status: Abnormal   Result Value Ref Range    GLUCOSE BY METER POCT 182 (H) 70 - 99 mg/dL   Sodium     Status: Abnormal   Result Value Ref Range    Sodium 127 (L) 136 - 145 mmol/L   Basic metabolic panel     Status: Abnormal   Result Value Ref Range    Sodium 127 (L) 136 - 145 mmol/L    Potassium 4.1 3.4 - 5.3 mmol/L    Chloride 93 (L) 98 - 107 mmol/L     Carbon Dioxide (CO2) 20 (L) 22 - 29 mmol/L    Anion Gap 14 7 - 15 mmol/L    Urea Nitrogen 15.0 8.0 - 23.0 mg/dL    Creatinine 1.36 (H) 0.67 - 1.17 mg/dL    Calcium 8.0 (L) 8.8 - 10.2 mg/dL    Glucose 168 (H) 70 - 99 mg/dL    GFR Estimate 58 (L) >60 mL/min/1.73m2   Glucose by meter     Status: Abnormal   Result Value Ref Range    GLUCOSE BY METER POCT 166 (H) 70 - 99 mg/dL   CRP inflammation     Status: Abnormal   Result Value Ref Range    CRP Inflammation 200.88 (H) <5.00 mg/L   CBC with platelets and differential     Status: Abnormal   Result Value Ref Range    WBC Count 11.9 (H) 4.0 - 11.0 10e3/uL    RBC Count 2.99 (L) 4.40 - 5.90 10e6/uL    Hemoglobin 8.5 (L) 13.3 - 17.7 g/dL    Hematocrit 26.2 (L) 40.0 - 53.0 %    MCV 88 78 - 100 fL    MCH 28.4 26.5 - 33.0 pg    MCHC 32.4 31.5 - 36.5 g/dL    RDW 13.2 10.0 - 15.0 %    Platelet Count 270 150 - 450 10e3/uL    % Neutrophils 79 %    % Lymphocytes 10 %    % Monocytes 9 %    % Eosinophils 1 %    % Basophils 0 %    % Immature Granulocytes 1 %    NRBCs per 100 WBC 0 <1 /100    Absolute Neutrophils 9.3 (H) 1.6 - 8.3 10e3/uL    Absolute Lymphocytes 1.2 0.8 - 5.3 10e3/uL    Absolute Monocytes 1.1 0.0 - 1.3 10e3/uL    Absolute Eosinophils 0.1 0.0 - 0.7 10e3/uL    Absolute Basophils 0.0 0.0 - 0.2 10e3/uL    Absolute Immature Granulocytes 0.1 <=0.4 10e3/uL    Absolute NRBCs 0.0 10e3/uL   Glucose by meter     Status: Abnormal   Result Value Ref Range    GLUCOSE BY METER POCT 175 (H) 70 - 99 mg/dL   CRP inflammation     Status: Abnormal   Result Value Ref Range    CRP Inflammation 199.19 (H) <5.00 mg/L   Glucose by meter     Status: Abnormal   Result Value Ref Range    GLUCOSE BY METER POCT 161 (H) 70 - 99 mg/dL   Glucose by meter     Status: Abnormal   Result Value Ref Range    GLUCOSE BY METER POCT 170 (H) 70 - 99 mg/dL   EKG 12-lead, tracing only     Status: None   Result Value Ref Range    Systolic Blood Pressure  mmHg    Diastolic Blood Pressure  mmHg    Ventricular Rate  103 BPM    Atrial Rate 103 BPM    CA Interval 154 ms    QRS Duration 74 ms     ms    QTc 406 ms    P Axis 60 degrees    R AXIS -17 degrees    T Axis 33 degrees    Interpretation ECG       Sinus tachycardia  Moderate voltage criteria for LVH, may be normal variant  Borderline ECG  When compared with ECG of 22-JUN-2021 08:18,  Premature ventricular complexes are no longer Present  Questionable change in QRS axis  T wave inversion no longer evident in Anterior leads  Confirmed by - EMERGENCY ROOM, PHYSICIAN ([id number]),  ROMÁN WOODWARD ([id number]) on 12/23/2022 7:39:14 AM     Adult Type and Screen     Status: None   Result Value Ref Range    ABO/RH(D) O POS     Antibody Screen Negative Negative    SPECIMEN EXPIRATION DATE [id number]    Blood Culture Peripheral Blood     Status: Normal (Preliminary result)    Specimen: Peripheral Blood   Result Value Ref Range    Culture No growth after 12 hours    CBC with platelets differential     Status: Abnormal    Narrative    The following orders were created for panel order CBC with platelets differential.  Procedure                               Abnormality         Status                     ---------                               -----------         ------                     CBC with platelets and d...[[id number]]  Abnormal            Final result                 Please view results for these tests on the individual orders.   CBC with platelets differential     Status: Abnormal    Narrative    The following orders were created for panel order CBC with platelets differential.  Procedure                               Abnormality         Status                     ---------                               -----------         ------                     CBC with platelets and d...[[id number]]  Abnormal            Final result                 Please view results for these tests on the individual orders.   ABO/Rh type and screen     Status: None    Narrative    The following  orders were created for panel order ABO/Rh type and screen.  Procedure                               Abnormality         Status                     ---------                               -----------         ------                     Adult Type and Screen[[id number]]                            Final result                 Please view results for these tests on the individual orders.          Attestation:  I have reviewed today's vital signs, notes, medications, labs and imaging with Dr. Colby English.  Amount of time performed on this consult: 15 minutes.    Amee Khan PA-C  [facility]

## 2022-12-23 NOTE — CONSULTS
ID consult dictated IMP 1 62-year-old diabetic male, chronic hand and foot neuropathy, recurrent infections and wounds more on hands and feet, now right third finger with major infection proximally extending into the hand, osteo on imaging, looks finger threatening    REC Vanco and ceftriaxone for now, await operative plan and any cultures, bone type of antibiotics depends on what is done operatively and cultures

## 2022-12-23 NOTE — PHARMACY-ADMISSION MEDICATION HISTORY
Admission medication history interview status for this patient is complete. See [facility] admission navigator for allergy information, prior to admission medications and immunization status.     Medication history interview source(s):Patient  Medication history resources (including written lists, pill bottles, clinic record):[employer] Xotics list, [employer]s  Primary pharmacy:[facility] on [address]    Changes made to PTA medication list:  Added: ibuprofen prn, wellbutrin xl 150mg, zofran at bedtime, protonix prn  Deleted: amlactin x 3 entries; duplicate ASA, pepcid, finasteride, levothyroxine, losartan, metformin, protonix, zocor; wellbutrin xl 300mg x 2 entries, hydrocortisone prn, bactroban oint q8h, novolog insulin, nystatin oint bid, zofran prn, scheduled protonix  Changed: ----    Actions taken by pharmacist (provider contacted, etc):None     Additional medication history information:pt states he is not good at remembering to take his morning meds.  Several medications do not show recent fills, but pt states still taking.    Medication reconciliation/reorder completed by provider prior to medication history? No, sticky note left for MD and MD paged      For patients on insulin therapy:Y  Lantus/levemir/NPH/Mix 70/30 dose: Y (see below)  Sliding scale Novolog N  If yes, do you have baseline novolog pre-meal dose: No  Patients eat three meals a day: 2 meals per day  Does not check BG.    Any barriers to therapy: cost of medications/comfortable with giving injections (if applicable)/comfortable and confident with current diabetes regimen.    Prior to Admission medications    Medication Sig Last Dose Taking? Auth Provider Long Term End Date   amoxicillin-clavulanate (AUGMENTIN) 875-125 MG tablet Take 1 tablet by mouth 2 times daily for 14 days 12/22/2022 at am Yes Lisa Pierre PA-C  1/3/23   ASPIRIN 81 MG OR TABS Take 81 mg by mouth every evening 12/21/2022 at pm Yes Saúl Pope MD     atenolol (TENORMIN) 25 MG  tablet Take 1 tablet (25 mg) by mouth daily Past Week Yes Lisa Pierre PA-C Yes    buPROPion (WELLBUTRIN XL) 150 MG 24 hr tablet Take 150 mg by mouth every evening 12/21/2022 at pm Yes Unknown, Entered By History No    Calcium Carb-Cholecalciferol (CALCIUM 600 + D PO) Take 1 tablet by mouth daily More than a month Yes Reported, Patient     citalopram (CELEXA) 40 MG tablet TAKE 1 TABLET BY MOUTH EVERY DAY Past Week Yes Lisa Pierre PA-C Yes    Cyanocobalamin (VITAMIN B 12 PO) Take 250 mcg by mouth daily Past Month Yes Reported, Patient     famotidine (PEPCID) 40 MG tablet Take 1 tablet (40 mg) by mouth daily Past Month Yes Lisa Pierre PA-C     ferrous sulfate 325 (65 FE) MG tablet Take 1 tablet by mouth daily (with breakfast). Past Week Yes Mariangel Fernando PA-C Yes    fluticasone (FLONASE) 50 MCG/ACT nasal spray Spray 1 spray into both nostrils daily as needed for rhinitis or allergies More than a month Yes Unknown, Entered By History     glipiZIDE (GLUCOTROL XL) 10 MG 24 hr tablet TAKE 2 TABLETS BY MOUTH EVERY DAY Past Week Yes Cindi Meraz MD Yes    ibuprofen (ADVIL/MOTRIN) 200 MG tablet Take 400 mg by mouth every 6 hours as needed for pain Past Week Yes Unknown, Entered By History     insulin glargine (BASAGLAR KWIKPEN) 100 UNIT/ML pen INJECT 30 UNITS UNDER THE SKIN ONCE DAILY. INCREASE AS DIRECTED TO MAX DOSE OF 45 UNITS 12/21/2022 at pm-30 units Yes Cindi Meraz MD Yes    latanoprost (XALATAN) 0.005 % ophthalmic solution INSTILL 1 DROP INTO BOTH EYES IN THE EVENING 12/21/2022 Yes Reported, Patient Yes    LEVOXYL 137 MCG tablet TAKE 1 TABLET (137 MCG) BY MOUTH DAILY DISPENSE NAME BRAND LEVOXYL ONLY, NO GENERICS 12/21/2022 Yes Cindi Meraz MD Yes    losartan (COZAAR) 100 MG tablet Take 1 tablet (100 mg) by mouth daily Past Week Yes Lisa Pierre PA-C Yes    metFORMIN (GLUCOPHAGE) 1000 MG tablet Take 1 tablet (1,000 mg) by mouth 2 times daily (with meals)  Past Week Yes Lisa Pierre PA-C Yes    MULTI-VITAMIN OR TABS Take 1 tablet by mouth daily Past Week Yes Saúl Pope MD     ondansetron (ZOFRAN ODT) 4 MG ODT tab Take 4 mg by mouth At Bedtime Past Week Yes Unknown, Entered By History     pantoprazole (PROTONIX) 40 MG EC tablet Take 40 mg by mouth daily as needed for heartburn Past Week Yes Unknown, Entered By History     VITAMIN D, CHOLECALCIFEROL, PO Take 1,000 Units by mouth daily. Past Week Yes Reported, Patient Yes    finasteride (PROSCAR) 5 MG tablet Take 1 tablet (5 mg) by mouth daily 12/21/2022 at pm  Lisa Pierre PA-C     simvastatin (ZOCOR) 20 MG tablet Take 1 tablet (20 mg) by mouth At Bedtime 12/21/2022 at pm  Lisa Pierre PA-C Yes

## 2022-12-23 NOTE — PHARMACY-VANCOMYCIN DOSING SERVICE
Pharmacy Vancomycin Initial Note  Date of Service 2022  Patient's  1960  62 year old, male    Indication: Osteomyelitis    Current estimated CrCl = Estimated Creatinine Clearance: 58 mL/min (A) (based on SCr of 1.51 mg/dL (H)).    Creatinine for last 3 days  2022:  6:27 PM Creatinine 1.51 mg/dL    Recent Vancomycin Level(s) for last 3 days  No results found for requested labs within last 72 hours.      Vancomycin IV Administrations (past 72 hours)                   vancomycin (VANCOCIN) 2,250 mg in sodium chloride 0.9 % 500 mL intermittent infusion (mg) 2,250 mg New Bag 22                Nephrotoxins and other renal medications (From now, onward)    Start     Dose/Rate Route Frequency Ordered Stop    22 2100  vancomycin (VANCOCIN) 1000 mg in dextrose 5% 200 mL PREMIX         1,000 mg  200 mL/hr over 1 Hours Intravenous EVERY 24 HOURS 22 2225      22 1900  vancomycin (VANCOCIN) 2,250 mg in sodium chloride 0.9 % 500 mL intermittent infusion         2,250 mg  over 120 Minutes Intravenous ONCE 22 1833            Contrast Orders - past 72 hours (72h ago, onward)    None          [employer] Prediction of Planned Initial Vancomycin Regimen  Loading dose: 2250 mg  Regimen: 1000 mg IV every 24 hours.  Start time: 23:22 on 2022  Exposure target: AUC24 (range)400-600 mg/L.hr   AUC24,ss: 475 mg/L.hr  Probability of AUC24 > 400: 63 %  Ctrough,ss: 13 mg/L  Probability of Ctrough,ss > 20: 30 %  Probability of nephrotoxicity (Lodise CLAUDIO ): 8 %        Plan:  1. Start vancomycin  1000 mg IV q24h.   2. Vancomycin monitoring method: AUC  3. Vancomycin therapeutic monitoring goal: 400-600 mg*h/L  4. Pharmacy will check vancomycin levels as appropriate in 1-3 Days.    5. Serum creatinine levels will be ordered daily for the first week of therapy and at least twice weekly for subsequent weeks.      Moon Lemos, PharmD, BCPS  Emergency Medicine Clinical  Pharmacist  [phone]

## 2022-12-23 NOTE — H&P
[facility]    History and Physical  Hospitalist       Date of Admission:  12/22/2022  Date of Service (when I saw the patient): 12/22/22    Assessment & Plan   Steve Morgan is a 62 year old male patient with past medical history of diabetes mellitus type 2, obstructive sleep apnea, diabetic neuropathy, morbid obesity, hypertension, hyperlipidemia, hypothyroidism, depression, who came to emergency room for evaluation for progressive cough, right hand wound and swelling. Patient stated that he has been having progressive cough since October. His cough got worse since Tuesday. He also stated that he had chronic wound of both hands due to neuropathy of his hands for which he follows with wound care nurse.  He stated that he developed swelling of right 3rd finger since Tuesday.  He developed erythema of right third finger today.  He came to emergency room for evaluation.  On arrival to emergency room, his vital signs were checked and showed temperature 98.8, pulse 103, blood pressure 174/92, oxygen saturation 97% on room air.  Laboratory work-up showed sodium 118, potassium 4.7, creatinine 1.51, lactic acid 0.9, WBC 15.6, hemoglobin 9.1.  Chest x-ray showed streaky opacities in both lungs suspicious for atelectasis versus developing pneumonia.  X-ray of the right hand was done and showed marked soft tissue swelling throughout the right third finger, severe flexion deformity and suspicious for osteomyelitis.  In emergency room, he was given IV fluid.  He was also started on IV Zosyn and vancomycin.  He was admitted to the hospital for further management.    Osteomyelitis of right third finger  History of diabetic neuropathy with hand deformities  Chronic hand wound  Patient has multiple wounds on both hands with deformity of bilateral fingers.  He has swelling and erythema of right third finger.  X-ray of the right hand concerning for osteomyelitis.  Started on IV Zosyn and vancomycin in the ER.   We will continue vancomycin.  We will add IV ceftriaxone.  Will consult orthopedic surgery for further evaluation recommendations.   We will keep n.p.o. after midnight in case he needs surgical intervention    Cough, rule out pneumonia  He does have intermittent cough which worsened since Tuesday.  Started on oral Augmentin as outpatient.  Still complains of cough.  Chest x-ray showed streaky opacities in both lungs which could represent atelectasis or pneumonia.  Started on vancomycin and ceftriaxone which should also cover pneumonia    Hyponatremia  Started on normal saline at 75 mill per hour for slow correction.  We will monitor serum sodium every 4 hours.    Acute kidney injury on chronic kidney disease  Baseline creatinine around 1.01-1.08.  Creatinine 1.51 today.  Started on IV fluids.    Diabetes mellitus, insulin requiring  On Lantus insulin, metformin and glipizide.  We will hold glipizide and metformin.  We will put him on insulin sliding scale.  We will put him on Lantus insulin 15 units nightly (half dose)    Hypertension: We will resume PTA medication once reviewed by pharmacy      Will admit patient to inpatient status    DVT Prophylaxis: Pneumatic Compression Devices  Code Status: Full Code    Disposition: Expected discharge: anticipate more than 2 nights of hospital course    Delio Vela MD    Primary Care Physician   Lisa Pierre    Chief Complaint   Cough, hand swelling and redness    History is obtained from the patient    History of Present Illness   Steve Morgan is a 62 year old male patient with past medical history of diabetes mellitus type 2, obstructive sleep apnea, diabetic neuropathy, morbid obesity, hypertension, hyperlipidemia, hypothyroidism, depression, who came to emergency room for evaluation for progressive cough, right hand wound and swelling. Patient stated that he has been having progressive cough since October.  His cough got worse since Tuesday.  His cough was  initially dry but now producing some sputum.  He denies fever.  He also denies significant shortness of breath or chest pain.  He also stated that he had chronic wound of both hands due to neuropathy of his hands for which he follows with wound care nurse.  He stated that he developed  swelling of right third finger since Tuesday.  He stated that he called his primary care physician and was prescribed Augmentin.  He was also seen at urgent care for right finger wound and was told that the antibiotic he was started on should cover wound infection.  Today, he developed erythema and blisters on his right third finger.  He decided to come to emergency room for evaluation.  On arrival to emergency room, his vital signs were checked and showed temperature 98.8, pulse 103, blood pressure 174/92, oxygen saturation 97% on room air.  Laboratory work-up showed sodium 118, potassium 4.7, creatinine 1.51, lactic acid 0.9, WBC 15.6, hemoglobin 9.1.  Chest x-ray showed streaky opacities in both lungs suspicious for atelectasis versus developing pneumonia.  X-ray of the right hand was done and showed marked soft tissue swelling throughout the right third finger, severe flexion deformity and suspicious for osteomyelitis.  In emergency room, he was given IV fluid.  He was also started on IV Zosyn and vancomycin.  He was admitted to the hospital for further management.    Past Medical History    I have reviewed this patient's medical history and updated it with pertinent information if needed.   Past Medical History:   Diagnosis Date     Cellulitis and abscess of trunk 6/27/2017     Depression      Hyperlipidemia LDL goal <100 10/31/2010     Hypertension goal BP (blood pressure) < 140/90 3/17/2011     Hypothyroidism 1/12/2010     Morbid obesity due to excess calories (H) 1/12/2010     Neuropathy in diabetes (H) 1/12/2010     Obesity 1/12/2010     Sleep apnea 1/12/2010    CPAP     Type 2 diabetes, HbA1C goal < 8% (H) 3/8/2011        Past Surgical History   I have reviewed this patient's surgical history and updated it with pertinent information if needed.  Past Surgical History:   Procedure Laterality Date     BIOPSY       COLONOSCOPY       EYE SURGERY       GENITOURINARY SURGERY      surg for undescended testicle     REPAIR HAMMER TOE BILATERAL  2013    Procedure: REPAIR HAMMER TOE BILATERAL;  Flexor Tenotomy Toes 2,3,4,5 Bilateral Feet;  Surgeon: Saad Bangura DPM;  Location: [facility] OR       Prior to Admission Medications   Prior to Admission Medications   Prescriptions Last Dose Informant Patient Reported? Taking?   ASPIRIN 81 MG OR TABS   Yes No   Sig: ONE DAILY   B-D U/F insulin pen needle   No No   Sig: USE 4 DAILY OR AS DIRECTED.   Calcium Carb-Cholecalciferol (CALCIUM 600 + D PO)   Yes No   Sig: Take 1 tablet by mouth daily   Continuous Blood Gluc  (FREESTYLE GIULIANA 14 DAY READER) SIENA   No No   Si each continuous   Continuous Blood Gluc Sensor (FREESTYLE GIULIANA 14 DAY SENSOR) MISC   No No   Sig: USE 1 SENSOR EVERY 14 DAYS   Cyanocobalamin (VITAMIN B 12 PO)  Self Yes No   Sig: Take 250 mcg by mouth daily   LEVOXYL 137 MCG tablet   No No   Sig: TAKE 1 TABLET (137 MCG) BY MOUTH DAILY DISPENSE NAME BRAND LEVOXYL ONLY, NO GENERICS   MULTI-VITAMIN OR TABS   Yes No   Si TABLET DAILY   NOVOLOG FLEXPEN 100 UNIT/ML soln   No No   Sig: TAKE 5 UNITS BEFORE EACH MEAL. INCREASE AS DIRECTED UP TO 45 UNITS PER DAY.   VITAMIN D, CHOLECALCIFEROL, PO   Yes No   Sig: Take 1,000 Units by mouth daily.   ammonium lactate (AMLACTIN) 12 % external cream   No No   Sig: Apply topically daily Apply to feet daily   ammonium lactate (AMLACTIN) 12 % external cream   Yes No   ammonium lactate (LAC-HYDRIN) 12 % cream   No No   Sig: Apply topically 2 times daily as needed for dry skin   amoxicillin-clavulanate (AUGMENTIN) 875-125 MG tablet   No No   Sig: Take 1 tablet by mouth 2 times daily for 14 days   aspirin (ASA) 81 MG EC tablet   Yes No    Sig: Take 1 tablet by mouth every 24 hours   atenolol (TENORMIN) 25 MG tablet   No No   Sig: Take 1 tablet (25 mg) by mouth daily   buPROPion (WELLBUTRIN XL) 300 MG 24 hr tablet   No No   Sig: Take 1 tablet (300 mg) by mouth every morning   buPROPion (WELLBUTRIN XL) 300 MG 24 hr tablet   Yes No   Sig: Take 1 tablet by mouth every 24 hours   citalopram (CELEXA) 40 MG tablet   No No   Sig: TAKE 1 TABLET BY MOUTH EVERY DAY   famotidine (PEPCID) 40 MG tablet   Yes No   Sig: Take 1-2 tablets by mouth every 24 hours   famotidine (PEPCID) 40 MG tablet   No No   Sig: Take 1 tablet (40 mg) by mouth daily   ferrous sulfate 325 (65 FE) MG tablet   Yes No   Sig: Take 1 tablet by mouth daily (with breakfast).   finasteride (PROSCAR) 5 MG tablet   Yes No   Sig: Take 1 tablet by mouth every 24 hours   finasteride (PROSCAR) 5 MG tablet   No No   Sig: Take 1 tablet (5 mg) by mouth daily   fluticasone (FLONASE) 50 MCG/ACT nasal spray   No No   Sig: Spray 1 spray into both nostrils 2 times daily   glipiZIDE (GLUCOTROL XL) 10 MG 24 hr tablet   No No   Sig: TAKE 2 TABLETS BY MOUTH EVERY DAY   hydrocortisone (WESTCORT) 0.2 % cream   No No   Sig: Apply topically 2 times daily as needed Apply sparingly to affected area, BID.   insulin glargine (BASAGLAR KWIKPEN) 100 UNIT/ML pen   No No   Sig: INJECT 30 UNITS UNDER THE SKIN ONCE DAILY. INCREASE AS DIRECTED TO MAX DOSE OF 45 UNITS   latanoprost (XALATAN) 0.005 % ophthalmic solution   Yes No   Sig: INSTILL 1 DROP INTO BOTH EYES IN THE EVENING   levothyroxine (TIROSINT) 137 MCG capsule   Yes No   Sig: Take 1 capsule by mouth every 24 hours   losartan (COZAAR) 100 MG tablet   No No   Sig: Take 1 tablet (100 mg) by mouth daily   losartan (COZAAR) 100 MG tablet   Yes No   Sig: Take 1 tablet by mouth every 24 hours   metFORMIN (GLUCOPHAGE) 1000 MG tablet   Yes No   Sig: Take 1 tablet by mouth every 12 hours   metFORMIN (GLUCOPHAGE) 1000 MG tablet   No No   Sig: Take 1 tablet (1,000 mg) by mouth  2 times daily (with meals)   mupirocin (BACTROBAN) 2 % external ointment   Yes No   Sig: Apply topically every 8 hours   nystatin (MYCOSTATIN) 321864 UNIT/GM external ointment   Yes No   Sig: Apply topically every 12 hours   ondansetron (ZOFRAN) 4 MG tablet   No No   Sig: TAKE 1 TABLET BY MOUTH EVERY 8 HOURS AS NEEDED FOR NAUSEA   pantoprazole (PROTONIX) 40 MG EC tablet   No No   Sig: Take 1 tablet (40 mg) by mouth daily   pantoprazole (PROTONIX) 40 MG EC tablet   Yes No   Sig: Take 1 tablet by mouth   simvastatin (ZOCOR) 20 MG tablet   Yes No   Sig: Take 1 tablet by mouth every 24 hours   simvastatin (ZOCOR) 20 MG tablet   No No   Sig: Take 1 tablet (20 mg) by mouth At Bedtime      Facility-Administered Medications: None     Allergies   No Known Allergies    Social History   I have reviewed this patient's social history and updated it with pertinent information if needed. Steve SYKES Cathy  reports that he has never smoked. He has never used smokeless tobacco. He reports current alcohol use. He reports that he does not use drugs.    Family History   I have reviewed this patient's family history and updated it with pertinent information if needed.   Family History   Problem Relation Age of Onset     Breast Cancer Mother      Hypertension Mother      Thyroid Disease Mother      Depression Mother      Alzheimer Disease Mother 82     Cancer - colorectal Father      Thyroid Disease Father      Depression Father      Other - See Comments Father         bladder polyps     Heart Disease Maternal Grandmother         CHF     Circulatory Paternal Grandmother      Cancer Paternal Grandfather      Diabetes Brother      Diabetes Brother      Asthma Brother        Review of Systems   The 10 point Review of Systems is negative other than noted in the HPI or here. Wound on fingers, redness     Physical Exam   Temp: 98.8  F (37.1  C) Temp src: Oral BP: (!) 174/92 Pulse: 103   Resp: 20 SpO2: 97 % O2 Device: None (Room air)    Vital  Signs with Ranges  Temp:  [98.8  F (37.1  C)] 98.8  F (37.1  C)  Pulse:  [103] 103  Resp:  [20] 20  BP: (174)/(92) 174/92  SpO2:  [97 %] 97 %  250 lbs 0 oz    GEN:  Alert, oriented x 3, appears comfortable, NAD.  HEENT:  Normocephalic/atraumatic, no scleral icterus, no nasal discharge, mouth moist.  CV:  Regular rate and rhythm, no murmur or JVD.  S1 + S2 noted, no S3 or S4.  LUNGS:  Clear to auscultation bilaterally without rales/rhonchi/wheezing/retractions.  Symmetric chest rise on inhalation noted.  ABD:  Active bowel sounds, soft, non-tender/non-distended.  No rebound/guarding/rigidity.  EXT:  Contracted fingers  SKIN:  Erythema of the third finger with nondischarging wound and swelling. Wound on the medial aspect of right 3rd finger,   NEURO:  Symmetric muscle strength, sensation to touch grossly intact.  No new focal deficits appreciated.    Data   Data reviewed today:  I personally reviewed  Recent Labs   Lab 12/22/22  1827   WBC 15.6*   HGB 9.1*   MCV 86      *   POTASSIUM 4.7   CHLORIDE 82*   CO2 22   BUN 16.7   CR 1.51*   ANIONGAP 14   CHAR 8.3*   *   ALBUMIN 3.8   PROTTOTAL 7.7   BILITOTAL 0.8   ALKPHOS 115   ALT 38   AST 36       Recent Results (from the past 24 hour(s))   Chest XR,  PA & LAT    Narrative    EXAM: XR CHEST 2 VIEWS  LOCATION: [facility]  DATE/TIME: 12/22/2022 4:55 PM    INDICATION: sob  COMPARISON: None.      Impression    IMPRESSION: Heart size is normal. Streaky opacities are present in both lung bases, atelectasis versus developing pneumonia. Follow-up advised.. Upper lobes are clear. No pneumothorax. No effusions. No pneumothorax.   XR Finger Right G/E 2 Views    Narrative    EXAM: XR FINGER RIGHT G/E 2 VIEWS  LOCATION: [facility]  DATE/TIME: 12/22/2022 7:21 PM    INDICATION: Right third finger pain and swelling.  COMPARISON: None.      Impression    IMPRESSION:     Marked soft tissue swelling throughout the right third  finger. Severe flexion deformity and dislocation at the DIP joint, compatible with disruption of the common extensor tendon.     Erosion of the tuft of the distal phalanx is highly suspicious for osteomyelitis.     There is skin irregularity/ulceration in the tip of the finger, and there are a few small ossific fragments, which may be erosions related to osteomyelitis, or could represent an avulsion fracture related to prior tendon disruption. No erosive change,   cortical defect, or focal lucency within the middle or proximal phalanges. The PIP and MCP joints are normally aligned.     Marked soft tissue swelling is also present throughout the dorsum of the hand. No other fracture or erosion. No other joint malalignment.

## 2022-12-24 LAB
ALBUMIN UR-MCNC: 50 MG/DL
ANION GAP SERPL CALCULATED.3IONS-SCNC: 13 MMOL/L (ref 7–15)
APPEARANCE UR: CLEAR
BACTERIA #/AREA URNS HPF: ABNORMAL /HPF
BILIRUB UR QL STRIP: NEGATIVE
BUN SERPL-MCNC: 12.2 MG/DL (ref 8–23)
CALCIUM SERPL-MCNC: 8.6 MG/DL (ref 8.8–10.2)
CHLORIDE SERPL-SCNC: 94 MMOL/L (ref 98–107)
COLOR UR AUTO: ABNORMAL
CREAT SERPL-MCNC: 1.21 MG/DL (ref 0.67–1.17)
CRP SERPL-MCNC: 163.91 MG/L
DEPRECATED HCO3 PLAS-SCNC: 21 MMOL/L (ref 22–29)
ERYTHROCYTE [DISTWIDTH] IN BLOOD BY AUTOMATED COUNT: 13.2 % (ref 10–15)
GFR SERPL CREATININE-BSD FRML MDRD: 67 ML/MIN/1.73M2
GLUCOSE BLDC GLUCOMTR-MCNC: 161 MG/DL (ref 70–99)
GLUCOSE BLDC GLUCOMTR-MCNC: 175 MG/DL (ref 70–99)
GLUCOSE BLDC GLUCOMTR-MCNC: 180 MG/DL (ref 70–99)
GLUCOSE BLDC GLUCOMTR-MCNC: 194 MG/DL (ref 70–99)
GLUCOSE BLDC GLUCOMTR-MCNC: 300 MG/DL (ref 70–99)
GLUCOSE SERPL-MCNC: 149 MG/DL (ref 70–99)
GLUCOSE UR STRIP-MCNC: 500 MG/DL
HCT VFR BLD AUTO: 24.9 % (ref 40–53)
HGB BLD-MCNC: 8 G/DL (ref 13.3–17.7)
HGB UR QL STRIP: ABNORMAL
KETONES UR STRIP-MCNC: NEGATIVE MG/DL
LEUKOCYTE ESTERASE UR QL STRIP: NEGATIVE
MCH RBC QN AUTO: 28.3 PG (ref 26.5–33)
MCHC RBC AUTO-ENTMCNC: 32.1 G/DL (ref 31.5–36.5)
MCV RBC AUTO: 88 FL (ref 78–100)
MRSA DNA SPEC QL NAA+PROBE: NEGATIVE
MUCOUS THREADS #/AREA URNS LPF: PRESENT /LPF
NITRATE UR QL: NEGATIVE
OSMOLALITY SERPL: 284 MMOL/KG (ref 280–301)
OSMOLALITY UR: 350 MMOL/KG (ref 100–1200)
PH UR STRIP: 6.5 [PH] (ref 5–7)
PLATELET # BLD AUTO: 308 10E3/UL (ref 150–450)
POTASSIUM SERPL-SCNC: 4.1 MMOL/L (ref 3.4–5.3)
RBC # BLD AUTO: 2.83 10E6/UL (ref 4.4–5.9)
RBC URINE: 1 /HPF
SA TARGET DNA: POSITIVE
SODIUM SERPL-SCNC: 124 MMOL/L (ref 136–145)
SODIUM SERPL-SCNC: 126 MMOL/L (ref 136–145)
SODIUM SERPL-SCNC: 126 MMOL/L (ref 136–145)
SODIUM SERPL-SCNC: 128 MMOL/L (ref 136–145)
SODIUM SERPL-SCNC: 128 MMOL/L (ref 136–145)
SODIUM SERPL-SCNC: 129 MMOL/L (ref 136–145)
SODIUM SERPL-SCNC: 129 MMOL/L (ref 136–145)
SODIUM UR-SCNC: 61 MMOL/L
SP GR UR STRIP: 1.01 (ref 1–1.03)
SQUAMOUS EPITHELIAL: <1 /HPF
T4 FREE SERPL-MCNC: 1.14 NG/DL (ref 0.9–1.7)
TSH SERPL DL<=0.005 MIU/L-ACNC: 5.23 UIU/ML (ref 0.3–4.2)
UROBILINOGEN UR STRIP-MCNC: NORMAL MG/DL
VANCOMYCIN SERPL-MCNC: 7 UG/ML
WBC # BLD AUTO: 9.6 10E3/UL (ref 4–11)
WBC URINE: 1 /HPF

## 2022-12-24 PROCEDURE — 258N000003 HC RX IP 258 OP 636: Performed by: INTERNAL MEDICINE

## 2022-12-24 PROCEDURE — 36415 COLL VENOUS BLD VENIPUNCTURE: CPT | Performed by: INTERNAL MEDICINE

## 2022-12-24 PROCEDURE — 81001 URINALYSIS AUTO W/SCOPE: CPT | Performed by: INTERNAL MEDICINE

## 2022-12-24 PROCEDURE — 120N000001 HC R&B MED SURG/OB

## 2022-12-24 PROCEDURE — 84443 ASSAY THYROID STIM HORMONE: CPT | Performed by: INTERNAL MEDICINE

## 2022-12-24 PROCEDURE — 83930 ASSAY OF BLOOD OSMOLALITY: CPT | Performed by: INTERNAL MEDICINE

## 2022-12-24 PROCEDURE — 250N000013 HC RX MED GY IP 250 OP 250 PS 637: Performed by: INTERNAL MEDICINE

## 2022-12-24 PROCEDURE — 83935 ASSAY OF URINE OSMOLALITY: CPT | Performed by: INTERNAL MEDICINE

## 2022-12-24 PROCEDURE — 80202 ASSAY OF VANCOMYCIN: CPT | Performed by: INTERNAL MEDICINE

## 2022-12-24 PROCEDURE — 84439 ASSAY OF FREE THYROXINE: CPT | Performed by: INTERNAL MEDICINE

## 2022-12-24 PROCEDURE — 84300 ASSAY OF URINE SODIUM: CPT | Performed by: INTERNAL MEDICINE

## 2022-12-24 PROCEDURE — 84295 ASSAY OF SERUM SODIUM: CPT | Performed by: INTERNAL MEDICINE

## 2022-12-24 PROCEDURE — 99233 SBSQ HOSP IP/OBS HIGH 50: CPT | Performed by: INTERNAL MEDICINE

## 2022-12-24 PROCEDURE — 85014 HEMATOCRIT: CPT | Performed by: INTERNAL MEDICINE

## 2022-12-24 PROCEDURE — 86140 C-REACTIVE PROTEIN: CPT | Performed by: PHYSICIAN ASSISTANT

## 2022-12-24 PROCEDURE — 258N000003 HC RX IP 258 OP 636: Performed by: EMERGENCY MEDICINE

## 2022-12-24 PROCEDURE — 250N000011 HC RX IP 250 OP 636: Performed by: INTERNAL MEDICINE

## 2022-12-24 PROCEDURE — 250N000013 HC RX MED GY IP 250 OP 250 PS 637: Performed by: ORTHOPAEDIC SURGERY

## 2022-12-24 RX ORDER — SODIUM CHLORIDE 9 MG/ML
1000 INJECTION, SOLUTION INTRAVENOUS CONTINUOUS
Status: DISCONTINUED | OUTPATIENT
Start: 2022-12-24 | End: 2022-12-25

## 2022-12-24 RX ORDER — FINASTERIDE 5 MG/1
5 TABLET, FILM COATED ORAL DAILY
Status: DISCONTINUED | OUTPATIENT
Start: 2022-12-24 | End: 2022-12-29 | Stop reason: HOSPADM

## 2022-12-24 RX ORDER — NICOTINE POLACRILEX 4 MG
15-30 LOZENGE BUCCAL
Status: DISCONTINUED | OUTPATIENT
Start: 2022-12-24 | End: 2022-12-29 | Stop reason: HOSPADM

## 2022-12-24 RX ORDER — DEXTROSE MONOHYDRATE 25 G/50ML
25-50 INJECTION, SOLUTION INTRAVENOUS
Status: DISCONTINUED | OUTPATIENT
Start: 2022-12-24 | End: 2022-12-29 | Stop reason: HOSPADM

## 2022-12-24 RX ADMIN — Medication 1000 UNITS: at 08:23

## 2022-12-24 RX ADMIN — INSULIN ASPART 1 UNITS: 100 INJECTION, SOLUTION INTRAVENOUS; SUBCUTANEOUS at 03:15

## 2022-12-24 RX ADMIN — CYANOCOBALAMIN TAB 500 MCG 250 MCG: 500 TAB at 08:22

## 2022-12-24 RX ADMIN — SIMVASTATIN 20 MG: 20 TABLET, FILM COATED ORAL at 21:09

## 2022-12-24 RX ADMIN — ATENOLOL 25 MG: 25 TABLET ORAL at 08:21

## 2022-12-24 RX ADMIN — FERROUS SULFATE TAB 325 MG (65 MG ELEMENTAL FE) 325 MG: 325 (65 FE) TAB at 08:20

## 2022-12-24 RX ADMIN — LATANOPROST 1 DROP: 50 SOLUTION/ DROPS OPHTHALMIC at 21:13

## 2022-12-24 RX ADMIN — POLYETHYLENE GLYCOL 3350 17 G: 17 POWDER, FOR SOLUTION ORAL at 08:24

## 2022-12-24 RX ADMIN — SODIUM CHLORIDE 1000 ML: 9 INJECTION, SOLUTION INTRAVENOUS at 14:09

## 2022-12-24 RX ADMIN — LEVOTHYROXINE SODIUM 137 MCG: 0.11 TABLET ORAL at 08:19

## 2022-12-24 RX ADMIN — Medication 1 TABLET: at 08:23

## 2022-12-24 RX ADMIN — CITALOPRAM HYDROBROMIDE 40 MG: 20 TABLET ORAL at 08:22

## 2022-12-24 RX ADMIN — INSULIN ASPART 4 UNITS: 100 INJECTION, SOLUTION INTRAVENOUS; SUBCUTANEOUS at 11:50

## 2022-12-24 RX ADMIN — FAMOTIDINE 40 MG: 20 TABLET, FILM COATED ORAL at 08:22

## 2022-12-24 RX ADMIN — ACETAMINOPHEN 975 MG: 325 TABLET, FILM COATED ORAL at 12:13

## 2022-12-24 RX ADMIN — SODIUM CHLORIDE, PRESERVATIVE FREE: 5 INJECTION INTRAVENOUS at 12:13

## 2022-12-24 RX ADMIN — ACETAMINOPHEN 975 MG: 325 TABLET, FILM COATED ORAL at 02:10

## 2022-12-24 RX ADMIN — SODIUM CHLORIDE, PRESERVATIVE FREE: 5 INJECTION INTRAVENOUS at 12:18

## 2022-12-24 RX ADMIN — BUPROPION HYDROCHLORIDE 150 MG: 150 TABLET, EXTENDED RELEASE ORAL at 21:09

## 2022-12-24 RX ADMIN — GLIPIZIDE 20 MG: 10 TABLET, EXTENDED RELEASE ORAL at 08:19

## 2022-12-24 RX ADMIN — LOSARTAN POTASSIUM 100 MG: 100 TABLET, FILM COATED ORAL at 08:22

## 2022-12-24 RX ADMIN — SENNOSIDES AND DOCUSATE SODIUM 1 TABLET: 50; 8.6 TABLET ORAL at 08:22

## 2022-12-24 RX ADMIN — VANCOMYCIN HYDROCHLORIDE 1750 MG: 10 INJECTION, POWDER, LYOPHILIZED, FOR SOLUTION INTRAVENOUS at 21:16

## 2022-12-24 RX ADMIN — SODIUM CHLORIDE, PRESERVATIVE FREE: 5 INJECTION INTRAVENOUS at 12:16

## 2022-12-24 RX ADMIN — INSULIN ASPART 1 UNITS: 100 INJECTION, SOLUTION INTRAVENOUS; SUBCUTANEOUS at 08:27

## 2022-12-24 ASSESSMENT — ACTIVITIES OF DAILY LIVING (ADL)
ADLS_ACUITY_SCORE: 24
ADLS_ACUITY_SCORE: 30
ADLS_ACUITY_SCORE: 30
ADLS_ACUITY_SCORE: 24
ADLS_ACUITY_SCORE: 29
ADLS_ACUITY_SCORE: 24
ADLS_ACUITY_SCORE: 24
ADLS_ACUITY_SCORE: 27
ADLS_ACUITY_SCORE: 24
ADLS_ACUITY_SCORE: 24

## 2022-12-24 NOTE — OR NURSING
Patient assisted to restroom but patient was unable to void. Straight cath performed due to amount in bladder and inability to void. Patient tolerated well, denies any pain. 575 mL removed with straight cath.

## 2022-12-24 NOTE — PLAN OF CARE
Goal Outcome Evaluation:       To Do:  End of Shift Summary  For vital signs and complete assessments, please see documentation flowsheets.     Pertinent assessments: Pt A/Ox4. VSS. Denies nausea and SOB. Scheduled Tylenol given for pain 2/10 on right hand. CMS intact. Right arm elevated with pillow. Voiding adequately. Na 124.   Major Shift Events: uneventful  Treatment Plan: Rocephin, Vanco, elevate right hand Na check Q4 and await cultures.  Bedside Nurse: Nanette Lynn RN

## 2022-12-24 NOTE — OP NOTE
Procedure Date: 12/23/2022    PREOPERATIVE DIAGNOSIS:  Right long finger infection with osteomyelitis, full thickness tissue necrosis.    POSTOPERATIVE DIAGNOSIS:  Right long finger infection with osteomyelitis, full thickness tissue necrosis.    PROCEDURE PERFORMED:  Right long finger irrigation and debridement with partial amputation of the right long finger.    SURGEON:  Colby English MD    ANESTHESIA:  Regional with sedation.    PREOPERATIVE HISTORY:  A 62-year-old gentleman with a history of diabetes, peripheral neuropathy and poor perfusion with some full thickness necrosis, tip of the finger, and increasing pain and swelling with evidence of osteomyelitis along the tip, and possible septic DIP joint who presents at this time for operative management to clear infection of the right long finger with full-thickness necrosis.    DESCRIPTION OF PROCEDURE:  After obtaining informed consent, taken to the operating room and placed on the operating room table in supine position.  Regional anesthesia performed.  Preoperative antibiotics were given.  His right upper extremity was prepped and draped sterilely.    A mid lateral incision was made, extending along the DIP joint.  Large collection of purulent fluid was noted.  This was sent for cultures, fluid and tissue.  Inspection of the distal phalanx demonstrated erosive changes, that the distal phalanx was essentially floating within the large collection of pus, including the DIP joint.  With the full-thickness tissue necrosis and poor vascularity it was felt the definitive operative treatment being amputation to eradicate the infection and the necrotic tissue.  A fish-mouth incision was made with good soft tissue flaps dorsally and volarly.  A disarticulation of the DIP joint was performed with purulence extending up along the flexor tendon and DIP joint.  Amputation was performed.  There was some good bleeding tissue along this area.  Bipolar electrocautery was  used for hemostasis of the vessels and nerves.  Flexor tendon was then pulled to distract it and was incised, allowing it to retract.  Some infection extending up the dorsal aspect was debrided with a synovial rongeur.  A complete debridement of all devitalized and necrotic tissue, and infected tissue was performed.  It was thoroughly irrigated with copious amounts of sterile saline.  Again, hemostasis with the bipolar performed.  The fish-mouth wound was then loosely approximated with 4-0 nylon suture to approximate the skin edges.  We will continue with IV antibiotics.  A soft sterile dressing was then applied.  He tolerated the procedure well with no complications.  Transferred to the recovery room in stable condition.  Await cultures and continue IV antibiotics.      Colby English MD        D: 2022   T: 2022   MT: [id number]    Name:     ALBERT HUFFMANJavier  MRN:      [id number]        Account:        [id number]   :      1960           Procedure Date: 2022     Document: [id number]

## 2022-12-24 NOTE — PROGRESS NOTES
CPAP orders placed. RT and pt discussed CPAP usage at bedside; pt stated her would prefer to wear NC throughout the night and to wear home CPAP tomorrow night as wife is to bring in. Hospital CPAP at bedside if pt changes his mind. RT to follow up tomorrow night for CPAP usage.    Moris Mansfield, RT on 12/23/2022 at 9:38 PM

## 2022-12-24 NOTE — PROGRESS NOTES
[facility]  Hospitalist Progress Note  Hira Alamo M.D., M.B.A.   12/24/2022    Reason for Stay/active problem list      Severe hand infection with Osteomyelitis of right third finger    Hyponatremia     [place]          Assessment and Plan:        Summary of Stay:   Steve Morgan is a 62 year old male patient with past medical history of diabetes mellitus type 2, obstructive sleep apnea, diabetic neuropathy, morbid obesity, hypertension, hyperlipidemia, hypothyroidism, depression, who came to emergency room for evaluation for progressive cough, right hand wound and swelling. Patient stated that he has been having progressive cough since October. His cough got worse since Tuesday. He also stated that he had chronic wound of both hands due to neuropathy of his hands for which he follows with wound care nurse.  He stated that he developed swelling of right 3rd finger since 12/20/2022.  He developed erythema of right third finger day of admission.  He came to emergency room for evaluation.    On arrival to emergency room, his vital signs were checked and showed temperature 98.8, pulse 103, blood pressure 174/92, oxygen saturation 97% on room air.    Laboratory work-up showed sodium 118, potassium 4.7, creatinine 1.51, lactic acid 0.9, WBC 15.6, hemoglobin 9.1.    Chest x-ray showed streaky opacities in both lungs suspicious for atelectasis versus developing pneumonia.  X-ray of the right hand was done and showed marked soft tissue swelling throughout the right third finger, severe flexion deformity and suspicious for osteomyelitis.    In emergency room, he was given IV fluid.  He was also started on IV Zosyn and vancomycin.  He was admitted to the hospital for further management.         Problem List with Assessment and Plan:    Osteomyelitis of right third finger  History of diabetic neuropathy with hand deformities  Chronic hand wound  --Patient has multiple wounds on both hands with deformity  of bilateral fingers.  He has swelling and erythema of right third finger.  X-ray of the right hand concerning for osteomyelitis.  --Started on IV Zosyn and vancomycin in the ER which was continued with ceftriaxone and vancomycin   -- Orthopedic surgery was consulted and patient had   Irrigation and debridement with a partial amputation right long finger 12/23/22 by Dr English   -- currently afebrile but cultures pending , will continue abx and follow progress and ortho input ,  ID  following . Wound care     Cough, rule out pneumonia  --He does have intermittent cough which worsened in last few days   --  Started on oral Augmentin as outpatient.  Still complains of cough.  Chest x-ray showed streaky opacities in both lungs which could represent atelectasis or pneumonia.  --Started on vancomycin and ceftriaxone which should also cover pneumonia     Hyponatremia  -- serum sodium 118 on arrival , was treated with saline   -- improving with sodium at 126 today , will continue IVF and monitor sodium     Acute kidney injury on chronic kidney disease  --Baseline creatinine around 1.01-1.08.  Creatinine 1.51 on admission , non oliguric   --improving with current creatinine of 1.2, will continue IVF , monitor kidney function      Diabetes mellitus, insulin requiring  --On Lantus insulin, metformin and glipizide.   --his  glipizide and metformin was  on hold  For surgery   -- currently BG is uncontrolled , last  , will give 15 units of lantus now and resume home Lantus at 30 units at bedtime , continue ssi , resume glipizide   Hypertension:  -- Resumed PTA medication    Plan for today:    BG management as above     Follow cultures        VTE Prophylaxis: Pneumatic Compression Devices  Code Status: Full Code  Diet: Advance Diet as Tolerated: Regular Diet Adult    [place] Catheter: Not present    Family updated today: No unable to reach spouse      Disposition: May discharge home in the three days pending improvement and  "antibiotic plan         Interval History (Subjective):        Patient is seen and examined by me today and medical record reviewed.Overnight events noted and care discussed with nursing staff.He is doing well. No complaint , no fever or chills                 Physical Exam:        Last Vital Signs:  BP (!) 174/81   Pulse 80   Temp 97.6  F (36.4  C) (Oral)   Resp 18   Ht 1.651 m (5' 5\")   Wt 110.9 kg (244 lb 6.4 oz)   SpO2 94%   BMI 40.67 kg/m      I/O last 3 completed shifts:  In: 300 [I.V.:300]  Out: 1380 [Urine:1375; Blood:5]    Wt Readings from Last 5 Encounters:   12/23/22 110.9 kg (244 lb 6.4 oz)   06/15/22 111.1 kg (245 lb)   11/16/21 111.1 kg (245 lb)   09/24/21 109.3 kg (241 lb)   08/09/21 112 kg (247 lb)        Constitutional: Awake, alert, cooperative, no apparent distress     Respiratory: Clear to auscultation bilaterally, no crackles or wheezing   Cardiovascular: Regular rate and rhythm, normal S1 and S2, and no murmur noted   Abdomen: Normal bowel sounds, soft, non-distended, non-tender   Skin: No new rashes, no cyanosis, dry to touch   Neuro: Alert with  no new focal weakness   Extremities: Dressed right hand    Other(s):        All other systems: Negative          Medications:        All current medications were reviewed with changes reflected in problem list.         Data:      All new lab and imaging data was reviewed.      Data reviewed today: I reviewed all new labs and imaging results over the last 24 hours. I personally reviewed     Recent Labs   Lab 12/24/22  0713 12/23/22  0805 12/22/22  1827   WBC 9.6 11.9* 15.6*   HGB 8.0* 8.5* 9.1*   HCT 24.9* 26.2* 26.9*   MCV 88 88 86    270 298     No results for input(s): CULT in the last 168 hours.  Recent Labs   Lab 12/24/22  1304 12/24/22  1140 12/24/22  1010 12/24/22  0724 12/24/22  0713 12/23/22  0906 12/23/22  0805 12/23/22  0128 12/22/22  1827   *  --  126*  --  128*  128*   < > 127*  127*   < > 118*   POTASSIUM  --   --   " --   --  4.1  --  4.1  --  4.7   CHLORIDE  --   --   --   --  94*  --  93*  --  82*   CO2  --   --   --   --  21*  --  20*  --  22   ANIONGAP  --   --   --   --  13  --  14  --  14   GLC  --  300*  --  161* 149*   < > 168*   < > 253*   BUN  --   --   --   --  12.2  --  15.0  --  16.7   CR  --   --   --   --  1.21*  --  1.36*  --  1.51*   GFRESTIMATED  --   --   --   --  67  --  58*  --  51*   CHAR  --   --   --   --  8.6*  --  8.0*  --  8.3*   PROTTOTAL  --   --   --   --   --   --   --   --  7.7   ALBUMIN  --   --   --   --   --   --   --   --  3.8   BILITOTAL  --   --   --   --   --   --   --   --  0.8   ALKPHOS  --   --   --   --   --   --   --   --  115   AST  --   --   --   --   --   --   --   --  36   ALT  --   --   --   --   --   --   --   --  38    < > = values in this interval not displayed.       Recent Labs   Lab 12/24/22  1140 12/24/22  0724 12/24/22  0713 12/24/22  0308 12/23/22  2134   * 161* 149* 180* 253*       No results for input(s): INR in the last 168 hours.      No results for input(s): TROPONIN, TROPI, TROPR in the last 168 hours.    Invalid input(s): TROP, TROPONINIES    Recent Results (from the past 48 hour(s))   Chest XR,  PA & LAT    Narrative    EXAM: XR CHEST 2 VIEWS  LOCATION: [facility]  DATE/TIME: 12/22/2022 4:55 PM    INDICATION: sob  COMPARISON: None.      Impression    IMPRESSION: Heart size is normal. Streaky opacities are present in both lung bases, atelectasis versus developing pneumonia. Follow-up advised.. Upper lobes are clear. No pneumothorax. No effusions. No pneumothorax.   XR Finger Right G/E 2 Views    Narrative    EXAM: XR FINGER RIGHT G/E 2 VIEWS  LOCATION: [facility]  DATE/TIME: 12/22/2022 7:21 PM    INDICATION: Right third finger pain and swelling.  COMPARISON: None.      Impression    IMPRESSION:     Marked soft tissue swelling throughout the right third finger. Severe flexion deformity and dislocation at the DIP  joint, compatible with disruption of the common extensor tendon.     Erosion of the tuft of the distal phalanx is highly suspicious for osteomyelitis.     There is skin irregularity/ulceration in the tip of the finger, and there are a few small ossific fragments, which may be erosions related to osteomyelitis, or could represent an avulsion fracture related to prior tendon disruption. No erosive change,   cortical defect, or focal lucency within the middle or proximal phalanges. The PIP and MCP joints are normally aligned.     Marked soft tissue swelling is also present throughout the dorsum of the hand. No other fracture or erosion. No other joint malalignment.         COVID Status:  COVID-19 PCR Results    COVID-19 PCR Results 12/22/22   SARS CoV2 PCR Negative      Comments are available for some flowsheets but are not being displayed.         COVID-19 Antibody Results, Testing for Immunity    COVID-19 Antibody Results, Testing for Immunity   No data to display.              Disclaimer: This note consists of symbols derived from keyboarding, dictation and/or voice recognition software. As a result, there may be errors in the script that have gone undetected. Please consider this when interpreting information found in this chart.

## 2022-12-24 NOTE — PROGRESS NOTES
End of Shift Summary  For vital signs and complete assessments, please see documentation flowsheets.     Pertinent assessments: Pt A/Ox4. VSS. Denies nausea and SOB. Scheduled Tylenol given for pain 2/10 on right hand. CMS intact. Right arm elevated with pillow. Voiding adequately. Latest Na 126, pt on NS.  & 300.     Major Shift Events: Asked MD about considering pt/ot as pt is very unsteady w/out using that R hand/arm     Treatment Plan: Rocephin, Vanco, elevate right hand, Na check Q4, ortho to still come by & look at finger    Bedside Nurse: Lisa Stapleton RN

## 2022-12-24 NOTE — PLAN OF CARE
Arrived to floor from PACU around 1900H    Vitals: Slightly elevated BP, otherwise stable  Neuro: A/Ox4  Cardiac: normal rate and rhythm  GI: no n/v  : Assisted to bathroom x1, was not able to void, bladder scanned, highest reading was 161ml, warm compress applied  Resp: on room air, SpO2 high 90s,   Activity: SBA with IV pole to bathroom  Pain: denies pain, refused Tylenol  Skin: s/p Right long finger I & D with partial amputation of the right long finger, covered with dressing     Plan: Vancomycin, Rocephin, pain management, monitor for post op complications

## 2022-12-25 LAB
CRP SERPL-MCNC: 88.76 MG/L
GLUCOSE BLDC GLUCOMTR-MCNC: 105 MG/DL (ref 70–99)
GLUCOSE BLDC GLUCOMTR-MCNC: 113 MG/DL (ref 70–99)
GLUCOSE BLDC GLUCOMTR-MCNC: 115 MG/DL (ref 70–99)
GLUCOSE BLDC GLUCOMTR-MCNC: 190 MG/DL (ref 70–99)
GLUCOSE BLDC GLUCOMTR-MCNC: 222 MG/DL (ref 70–99)
GLUCOSE BLDC GLUCOMTR-MCNC: 236 MG/DL (ref 70–99)
HGB BLD-MCNC: 9.3 G/DL (ref 13.3–17.7)
SODIUM SERPL-SCNC: 126 MMOL/L (ref 136–145)
SODIUM SERPL-SCNC: 126 MMOL/L (ref 136–145)
SODIUM SERPL-SCNC: 127 MMOL/L (ref 136–145)
SODIUM SERPL-SCNC: 128 MMOL/L (ref 136–145)
SODIUM SERPL-SCNC: 128 MMOL/L (ref 136–145)
SODIUM SERPL-SCNC: 129 MMOL/L (ref 136–145)
SODIUM SERPL-SCNC: 130 MMOL/L (ref 136–145)

## 2022-12-25 PROCEDURE — 84295 ASSAY OF SERUM SODIUM: CPT | Performed by: INTERNAL MEDICINE

## 2022-12-25 PROCEDURE — 36415 COLL VENOUS BLD VENIPUNCTURE: CPT | Performed by: PHYSICIAN ASSISTANT

## 2022-12-25 PROCEDURE — 250N000013 HC RX MED GY IP 250 OP 250 PS 637: Performed by: ORTHOPAEDIC SURGERY

## 2022-12-25 PROCEDURE — 86140 C-REACTIVE PROTEIN: CPT | Performed by: PHYSICIAN ASSISTANT

## 2022-12-25 PROCEDURE — 258N000003 HC RX IP 258 OP 636: Performed by: INTERNAL MEDICINE

## 2022-12-25 PROCEDURE — 85018 HEMOGLOBIN: CPT | Performed by: ORTHOPAEDIC SURGERY

## 2022-12-25 PROCEDURE — 99233 SBSQ HOSP IP/OBS HIGH 50: CPT | Performed by: INTERNAL MEDICINE

## 2022-12-25 PROCEDURE — 250N000011 HC RX IP 250 OP 636: Performed by: INTERNAL MEDICINE

## 2022-12-25 PROCEDURE — 250N000013 HC RX MED GY IP 250 OP 250 PS 637: Performed by: INTERNAL MEDICINE

## 2022-12-25 PROCEDURE — 36415 COLL VENOUS BLD VENIPUNCTURE: CPT | Performed by: INTERNAL MEDICINE

## 2022-12-25 PROCEDURE — 99233 SBSQ HOSP IP/OBS HIGH 50: CPT | Performed by: SPECIALIST

## 2022-12-25 PROCEDURE — 120N000001 HC R&B MED SURG/OB

## 2022-12-25 RX ORDER — AMOXICILLIN 250 MG
1-2 CAPSULE ORAL 2 TIMES DAILY PRN
Qty: 30 TABLET | Refills: 0 | Status: SHIPPED | OUTPATIENT
Start: 2022-12-25 | End: 2023-06-01

## 2022-12-25 RX ORDER — OXYCODONE HYDROCHLORIDE 5 MG/1
2.5-5 TABLET ORAL EVERY 6 HOURS PRN
Qty: 10 TABLET | Refills: 0 | Status: SHIPPED | OUTPATIENT
Start: 2022-12-25 | End: 2022-12-29

## 2022-12-25 RX ORDER — ACETAMINOPHEN 325 MG/1
650 TABLET ORAL EVERY 4 HOURS PRN
Qty: 100 TABLET | Refills: 0 | Status: ON HOLD | OUTPATIENT
Start: 2022-12-26 | End: 2024-05-18

## 2022-12-25 RX ADMIN — Medication 1000 UNITS: at 08:01

## 2022-12-25 RX ADMIN — SIMVASTATIN 20 MG: 20 TABLET, FILM COATED ORAL at 21:18

## 2022-12-25 RX ADMIN — ACETAMINOPHEN 975 MG: 325 TABLET, FILM COATED ORAL at 19:44

## 2022-12-25 RX ADMIN — SENNOSIDES AND DOCUSATE SODIUM 1 TABLET: 50; 8.6 TABLET ORAL at 21:18

## 2022-12-25 RX ADMIN — ACETAMINOPHEN 975 MG: 325 TABLET, FILM COATED ORAL at 11:39

## 2022-12-25 RX ADMIN — CYANOCOBALAMIN TAB 500 MCG 250 MCG: 500 TAB at 08:01

## 2022-12-25 RX ADMIN — SENNOSIDES AND DOCUSATE SODIUM 1 TABLET: 8.6; 5 TABLET ORAL at 21:27

## 2022-12-25 RX ADMIN — LATANOPROST 1 DROP: 50 SOLUTION/ DROPS OPHTHALMIC at 21:25

## 2022-12-25 RX ADMIN — ATENOLOL 25 MG: 25 TABLET ORAL at 08:00

## 2022-12-25 RX ADMIN — ACETAMINOPHEN 975 MG: 325 TABLET, FILM COATED ORAL at 03:49

## 2022-12-25 RX ADMIN — FAMOTIDINE 40 MG: 20 TABLET, FILM COATED ORAL at 08:03

## 2022-12-25 RX ADMIN — VANCOMYCIN HYDROCHLORIDE 1750 MG: 10 INJECTION, POWDER, LYOPHILIZED, FOR SOLUTION INTRAVENOUS at 21:14

## 2022-12-25 RX ADMIN — Medication 1 TABLET: at 08:01

## 2022-12-25 RX ADMIN — LEVOTHYROXINE SODIUM 137 MCG: 0.11 TABLET ORAL at 08:00

## 2022-12-25 RX ADMIN — OXYCODONE HYDROCHLORIDE 5 MG: 5 TABLET ORAL at 16:07

## 2022-12-25 RX ADMIN — FERROUS SULFATE TAB 325 MG (65 MG ELEMENTAL FE) 325 MG: 325 (65 FE) TAB at 08:01

## 2022-12-25 RX ADMIN — CITALOPRAM HYDROBROMIDE 40 MG: 20 TABLET ORAL at 08:01

## 2022-12-25 RX ADMIN — BUPROPION HYDROCHLORIDE 150 MG: 150 TABLET, EXTENDED RELEASE ORAL at 21:18

## 2022-12-25 RX ADMIN — FINASTERIDE 5 MG: 5 TABLET, FILM COATED ORAL at 08:02

## 2022-12-25 RX ADMIN — LOSARTAN POTASSIUM 100 MG: 100 TABLET, FILM COATED ORAL at 08:00

## 2022-12-25 RX ADMIN — GLIPIZIDE 20 MG: 10 TABLET, EXTENDED RELEASE ORAL at 08:03

## 2022-12-25 ASSESSMENT — ACTIVITIES OF DAILY LIVING (ADL)
ADLS_ACUITY_SCORE: 24
ADLS_ACUITY_SCORE: 24
ADLS_ACUITY_SCORE: 26
ADLS_ACUITY_SCORE: 26
ADLS_ACUITY_SCORE: 30
ADLS_ACUITY_SCORE: 26
ADLS_ACUITY_SCORE: 26
ADLS_ACUITY_SCORE: 30
ADLS_ACUITY_SCORE: 26
ADLS_ACUITY_SCORE: 26
ADLS_ACUITY_SCORE: 30
ADLS_ACUITY_SCORE: 24

## 2022-12-25 NOTE — PLAN OF CARE
Vitals: Slightly elevated BP, otherwise stable  Neuro: A/Ox4  Cardiac: normal rate and rhythm  GI: no n/v, BMx1  : voiding well  Resp: on room air, SpO2 high 90s,   Activity: Ax1 with walker to bathroom  Pain: denies pain, refused Tylenol  Skin: s/p Right long finger I & D with partial amputation of the right long finger, covered with dressing    No Xeroform gauze available. SPD not available. Minimal dried blood over dressing. R hand dressing intact. Oncoming nurse updated and Charge RN.      Plan: Vancomycin, elevate R hand and monitor, R hand dressing per orders

## 2022-12-25 NOTE — PLAN OF CARE
Goal Outcome Evaluation:    [facility] [id number]. Pt A&Ox4. Reported mild pain in R hand, and tolerable after scheduled Tylenol. Temp 99.3. BP elevated 176/84 after ambulating, and recheck is 148/72. Denies shortness of breath. R hand wrapped in Kerlix and Ace wrap. Continues with neuropathy in all extremities, saying he doesn't have sensation in hands and feet. Patient had 3 loose stools. He said he was given stool softeners before. Patient having tremors, and gait unsteady, but saying this is a baseline. Assist of 1 with gaitbelt and walker.

## 2022-12-25 NOTE — PHARMACY-VANCOMYCIN DOSING SERVICE
Pharmacy Vancomycin Note  Date of Service 2022  Patient's  1960   62 year old, male    Indication: Osteomyelitis  Day of Therapy: 3  Current vancomycin regimen:  1000 mg IV q24h  Current vancomycin monitoring method: AUC  Current vancomycin therapeutic monitoring goal: 400-600 mg*h/L    [employer] Prediction of Current Vancomycin Regimen  Regimen: 1000 mg IV every 24 hours.  Exposure target: AUC24 (range)400-600 mg/L.hr   AUC24,ss: 294 mg/L.hr  Probability of AUC24 > 400: 10 %  Ctrough,ss: 4.6 mg/L  Probability of Ctrough,ss > 20: 0 %  Probability of nephrotoxicity (Lodise CLAUDIO ): 3 %      Current estimated CrCl = Estimated Creatinine Clearance: 70.9 mL/min (A) (based on SCr of 1.21 mg/dL (H)).    Creatinine for last 3 days  2022:  6:27 PM Creatinine 1.51 mg/dL  2022:  8:05 AM Creatinine 1.36 mg/dL  2022:  7:13 AM Creatinine 1.21 mg/dL    Recent Vancomycin Levels (past 3 days)  2022:  6:47 PM Vancomycin 7.0 ug/mL    Vancomycin IV Administrations (past 72 hours)                   vancomycin (VANCOCIN) 1000 mg in dextrose 5% 200 mL PREMIX (mg) 1,000 mg New Bag 22    vancomycin (VANCOCIN) 2,250 mg in sodium chloride 0.9 % 500 mL intermittent infusion ()  Restarted 22 0111     2,250 mg New Bag 22                Nephrotoxins and other renal medications (From now, onward)    Start     Dose/Rate Route Frequency Ordered Stop    22  vancomycin (VANCOCIN) 1,750 mg in 0.9% NaCl 500 mL intermittent infusion         1,750 mg  over 2 Hours Intravenous EVERY 24 HOURS 22               Contrast Orders - past 72 hours (72h ago, onward)    None          Interpretation of levels and current regimen:  Vancomycin level is reflective of -600    Has serum creatinine changed greater than 50% in last 72 hours: No    Urine output:  good urine output    Renal Function: Improving    [employer] Prediction of Planned New Vancomycin  Regimen    Regimen: 1750 mg IV every 24 hours.  Start time: 2100 on 12/24/2022  Exposure target: AUC24 (range)400-600 mg/L.hr   AUC24,ss: 514 mg/L.hr  Probability of AUC24 > 400: 85 %  Ctrough,ss: 8.1 mg/L  Probability of Ctrough,ss > 20: 0 %  Probability of nephrotoxicity (Lodise CLAUDIO 2009): 5 %      Plan:  1. Increase Dose to 1750 mg IV q24hrs  2. Vancomycin monitoring method: AUC  3. Vancomycin therapeutic monitoring goal: 400-600 mg*h/L  4. Pharmacy will check vancomycin levels as appropriate in 3-5 Days.  5. Serum creatinine levels will be ordered daily for the first week of therapy and at least twice weekly for subsequent weeks.    Sam Caputo RPH

## 2022-12-25 NOTE — PROGRESS NOTES
[facility]  Hospitalist Progress Note  Hira Alamo M.D., M.B.A.   12/25/2022    Reason for Stay/active problem list      Severe hand infection with Osteomyelitis of right third finger    Hyponatremia     [place]          Assessment and Plan:        Summary of Stay:   Steve Morgan is a 62 year old male patient with past medical history of diabetes mellitus type 2, obstructive sleep apnea, diabetic neuropathy, morbid obesity, hypertension, hyperlipidemia, hypothyroidism, depression, who came to emergency room for evaluation for progressive cough, right hand wound and swelling. Patient stated that he has been having progressive cough since October. His cough got worse since Tuesday. He also stated that he had chronic wound of both hands due to neuropathy of his hands for which he follows with wound care nurse.  He stated that he developed swelling of right 3rd finger since 12/20/2022.  He developed erythema of right third finger day of admission.  He came to emergency room for evaluation.    On arrival to emergency room, his vital signs were checked and showed temperature 98.8, pulse 103, blood pressure 174/92, oxygen saturation 97% on room air.    Laboratory work-up showed sodium 118, potassium 4.7, creatinine 1.51, lactic acid 0.9, WBC 15.6, hemoglobin 9.1.    Chest x-ray showed streaky opacities in both lungs suspicious for atelectasis versus developing pneumonia.  X-ray of the right hand was done and showed marked soft tissue swelling throughout the right third finger, severe flexion deformity and suspicious for osteomyelitis.    In emergency room, he was given IV fluid.  He was also started on IV Zosyn and vancomycin.  He was admitted to the hospital for further management.         Problem List with Assessment and Plan:    Osteomyelitis of right third finger  History of diabetic neuropathy with hand deformities  Chronic hand wound  --Patient has multiple wounds on both hands with deformity  of bilateral fingers.  He has swelling and erythema of right third finger.  X-ray of the right hand concerning for osteomyelitis.  --Started on IV Zosyn and vancomycin in the ER which was continued with ceftriaxone and vancomycin -currently on vancomycin alone per ID.  -- Orthopedic surgery was consulted and patient had   Irrigation and debridement with a partial amputation right long finger 12/23/22 by Dr English   --Currently he is afebrile, he is right hand wound is dressed.  Further wound care and daily assessment and further imaging deferred to orthopedic surgery.  Briefly spoke with Dr. Lester of infectious the service who is concerned about tenosynovitis and recommendation of MRI study.  Anticipate daily follow-up by orthopedic surgery team.      Cough, rule out pneumonia  --He does have intermittent cough which worsened in last few days   --  Started on oral Augmentin as outpatient.  Still complains of cough.  Chest x-ray showed streaky opacities in both lungs which could represent atelectasis or pneumonia.       Hyponatremia  -- serum sodium 118 on arrival , was treated with saline   -- improving with sodium at 128 today , patient stated that he had history of low sodium level per his primary care physician from 6 months ago.  Chart review revealed serum sodium around 127 at baseline.  Discontinue IV fluid, monitor sodium for now    Acute kidney injury on chronic kidney disease  --Baseline creatinine around 1.01-1.08.  Creatinine 1.51 on admission , non oliguric   --Serum creatinine 1.21 on December 24, continue to monitor kidney function, avoid nephrotoxic medications     Diabetes mellitus, insulin requiring  --On Lantus insulin (30 units at bedtime), metformin and glipizide.   --his  glipizide and metformin was  on hold  For surgery   --Blood glucose has been variable but the control is improving.  Currently back on his 30 units of Lantus, sliding scale insulin, oral glipizide.  Continue to hold metformin  "and monitor blood sugar closely and titrate insulin as needed    Hypertension:  -- Resumed PTA medication-atenolol.  Losartan on hold due to acute kidney injury    Plan for today:    Anticipate daily follow-up by orthopedic surgery.    MRI study of right hand deferred to orthopedic surgery team.      VTE Prophylaxis: Pneumatic Compression Devices  Code Status: Full Code  Diet: Advance Diet as Tolerated: Regular Diet Adult    [place] Catheter: Not present    Family updated today: spouse called updated      Disposition: Unable to determine.  Patient may need further care in the hospital for IV antibiotic and surgical intervention as needed.  Orthopedic surgery and infectious disease service following.  May need additional 2 to 3 days.          Interval History (Subjective):        Patient is seen and examined by me today and medical record reviewed.Overnight events noted and care discussed with nursing staff.patient is feeling better.  No fever or chills.  He has no significant pain.  His wound is dressed and did not get checked since the procedure.  This was discussed with bedside nurse and orthopedic surgery team to be contacted to assist with further evaluation and recommendation.  I briefly spoke with infectious disease physician who is concerned about possibility of tenosynovitis and the necessity of MRI study to rule out that.               Physical Exam:        Last Vital Signs:  BP (!) 149/79 (BP Location: Left arm)   Pulse 65   Temp 98.3  F (36.8  C) (Oral)   Resp 16   Ht 1.651 m (5' 5\")   Wt 110.9 kg (244 lb 6.4 oz)   SpO2 97%   BMI 40.67 kg/m      I/O last 3 completed shifts:  In: -   Out: 650 [Urine:650]    Wt Readings from Last 5 Encounters:   12/23/22 110.9 kg (244 lb 6.4 oz)   06/15/22 111.1 kg (245 lb)   11/16/21 111.1 kg (245 lb)   09/24/21 109.3 kg (241 lb)   08/09/21 112 kg (247 lb)        Constitutional: Awake, alert, cooperative, no apparent distress     Respiratory: Clear to auscultation " bilaterally, no crackles or wheezing   Cardiovascular: Regular rate and rhythm, normal S1 and S2, and no murmur noted   Abdomen: Normal bowel sounds, soft, non-distended, non-tender   Skin: No new rashes, no cyanosis, dry to touch   Neuro: Alert with  no new focal weakness   Extremities: Dressed right hand    Other(s):        All other systems: Negative          Medications:        All current medications were reviewed with changes reflected in problem list.         Data:      All new lab and imaging data was reviewed.      Data reviewed today: I reviewed all new labs and imaging results over the last 24 hours. I personally reviewed     Recent Labs   Lab 12/25/22  0825 12/24/22  0713 12/23/22  0805 12/22/22  1827   WBC  --  9.6 11.9* 15.6*   HGB 9.3* 8.0* 8.5* 9.1*   HCT  --  24.9* 26.2* 26.9*   MCV  --  88 88 86   PLT  --  308 270 298     No results for input(s): CULT in the last 168 hours.  Recent Labs   Lab 12/25/22  1237 12/25/22  1229 12/25/22  1034 12/25/22  0825 12/25/22  0747 12/25/22  0606 12/24/22  0724 12/24/22  0713 12/23/22  0906 12/23/22  0805 12/23/22  0128 12/22/22  1827   *  --  128* 130*  --   --    < > 128*  128*   < > 127*  127*   < > 118*   POTASSIUM  --   --   --   --   --   --   --  4.1  --  4.1  --  4.7   CHLORIDE  --   --   --   --   --   --   --  94*  --  93*  --  82*   CO2  --   --   --   --   --   --   --  21*  --  20*  --  22   ANIONGAP  --   --   --   --   --   --   --  13  --  14  --  14   GLC  --  236*  --   --  115* 113*   < > 149*   < > 168*   < > 253*   BUN  --   --   --   --   --   --   --  12.2  --  15.0  --  16.7   CR  --   --   --   --   --   --   --  1.21*  --  1.36*  --  1.51*   GFRESTIMATED  --   --   --   --   --   --   --  67  --  58*  --  51*   CHAR  --   --   --   --   --   --   --  8.6*  --  8.0*  --  8.3*   PROTTOTAL  --   --   --   --   --   --   --   --   --   --   --  7.7   ALBUMIN  --   --   --   --   --   --   --   --   --   --   --  3.8   BILITOTAL  --    --   --   --   --   --   --   --   --   --   --  0.8   ALKPHOS  --   --   --   --   --   --   --   --   --   --   --  115   AST  --   --   --   --   --   --   --   --   --   --   --  36   ALT  --   --   --   --   --   --   --   --   --   --   --  38    < > = values in this interval not displayed.       Recent Labs   Lab 12/25/22  1229 12/25/22  0747 12/25/22  0606 12/25/22  0306 12/24/22  2043   * 115* 113* 105* 175*       No results for input(s): INR in the last 168 hours.      No results for input(s): TROPONIN, TROPI, TROPR in the last 168 hours.    Invalid input(s): TROP, TROPONINIES    Recent Results (from the past 48 hour(s))   Chest XR,  PA & LAT    Narrative    EXAM: XR CHEST 2 VIEWS  LOCATION: [facility]  DATE/TIME: 12/22/2022 4:55 PM    INDICATION: sob  COMPARISON: None.      Impression    IMPRESSION: Heart size is normal. Streaky opacities are present in both lung bases, atelectasis versus developing pneumonia. Follow-up advised.. Upper lobes are clear. No pneumothorax. No effusions. No pneumothorax.   XR Finger Right G/E 2 Views    Narrative    EXAM: XR FINGER RIGHT G/E 2 VIEWS  LOCATION: [facility]  DATE/TIME: 12/22/2022 7:21 PM    INDICATION: Right third finger pain and swelling.  COMPARISON: None.      Impression    IMPRESSION:     Marked soft tissue swelling throughout the right third finger. Severe flexion deformity and dislocation at the DIP joint, compatible with disruption of the common extensor tendon.     Erosion of the tuft of the distal phalanx is highly suspicious for osteomyelitis.     There is skin irregularity/ulceration in the tip of the finger, and there are a few small ossific fragments, which may be erosions related to osteomyelitis, or could represent an avulsion fracture related to prior tendon disruption. No erosive change,   cortical defect, or focal lucency within the middle or proximal phalanges. The PIP and MCP joints are  normally aligned.     Marked soft tissue swelling is also present throughout the dorsum of the hand. No other fracture or erosion. No other joint malalignment.         COVID Status:  COVID-19 PCR Results    COVID-19 PCR Results 12/22/22   SARS CoV2 PCR Negative      Comments are available for some flowsheets but are not being displayed.         COVID-19 Antibody Results, Testing for Immunity    COVID-19 Antibody Results, Testing for Immunity   No data to display.              Disclaimer: This note consists of symbols derived from keyboarding, dictation and/or voice recognition software. As a result, there may be errors in the script that have gone undetected. Please consider this when interpreting information found in this chart.

## 2022-12-25 NOTE — PROGRESS NOTES
End of Shift Summary  For vital signs and complete assessments, please see documentation flowsheets.     Pertinent assessments: Pt A/Ox4. VSS. Scheduled tylenol given for R finger pain. LS clear. HS regular. R finger dressing changed by nursing, erythema noted, w/ large amount of drainage on dressing.     Major Shift Events: Ortho saw pt. Dressing changed.     Treatment Plan: Rocephin, Vanco, elevate right hand, daily dressing changes, PT/OT     Bedside Nurse: Lisa Stapleton RN

## 2022-12-25 NOTE — PROGRESS NOTES
[facility]    Infectious Disease Progress Note    Date of Service : 12/25/2022     Assessment:  62-year-old male with chronic right third finger distal ulcer who has been hospitalized with worsening and evidence of osteomyelitis on imaging and is s/p i7D wit partial amputation. Cxs are growing s.aurues sensitivity pending    -Right third finger chronic ulcer with osteomyelitis and concern for septic tenosynovitis  -CKD  -Hyponatremia  -History of chronic and recurrent hand and foot infections and wounds, followed in the wound care center.    -Neuropathy  -History of methicillin-resistant Staphylococcus aureus, including from fingers.  -Diabetes mellitus.  -Ongoing cough for the last 2 months.  Given antibiotics recently, but nothing for bacterial pneumonia and unlikely to be the case, probably post-COVID type cough, some fluid present so possibly cardiac      Recommendations:  1. Follow cx sensitivities  2. Treat with Vancomycin for presumed MRSA, further antibiotic modification based on sensitivity data  3. Right 3rd finger still appears very swollen, consider MRI to assess for deep infection/ septic tenosynovitis  4. Orthopedic evaluation for further surgical plans  5. Vancomycin level subtherapeutic, pharmacy to dose based on AUC, monitor renal functions closely    Elizabeth Lester MD    Interval History   Patient was seen and examined, chart reviewed  S/p right long finger irrigation and debridement with partial amputation of the right long finger. Cxs are growing s.aureus, sensitivity is pending. Remained afebrile and is tolerating antibiotics without side effects    Physical Exam   Temp: 98.3  F (36.8  C) Temp src: Oral BP: (!) 149/79 Pulse: 65   Resp: 16 SpO2: 97 % O2 Device: None (Room air)    Vitals:    12/22/22 1628 12/23/22 0036   Weight: 113.4 kg (250 lb) 110.9 kg (244 lb 6.4 oz)     Vital Signs with Ranges  Temp:  [97.5  F (36.4  C)-99.3  F (37.4  C)] 98.3  F (36.8  C)  Pulse:  [62-78]  65  Resp:  [16-18] 16  BP: (148-176)/(66-84) 149/79  SpO2:  [95 %-97 %] 97 %    Constitutional: Awake, alert, cooperative, no apparent distress  Lungs: Clear to auscultation bilaterally, no crackles or wheezing  Cardiovascular: Regular rate and rhythm, normal S1 and S2, and no murmur noted  Abdomen: Normal bowel sounds, soft, non-distended, non-tender  Skin: No rash  MS :R hand long finer very swollen, red .     Other:    Medications       acetaminophen  975 mg Oral Q8H     atenolol  25 mg Oral Daily     buPROPion  150 mg Oral QPM     calcium carbonate-vitamin D  1 tablet Oral Daily     citalopram  40 mg Oral Daily     cyanocobalamin  250 mcg Oral Daily     famotidine  40 mg Oral Daily     ferrous sulfate  325 mg Oral Daily with breakfast     finasteride  5 mg Oral Daily     glipiZIDE  20 mg Oral Daily     insulin aspart  1-10 Units Subcutaneous TID AC     insulin aspart  1-7 Units Subcutaneous At Bedtime     insulin glargine  15 Units Subcutaneous Once     insulin glargine  30 Units Subcutaneous At Bedtime     latanoprost  1 drop Both Eyes At Bedtime     levothyroxine  137 mcg Oral Daily     losartan  100 mg Oral Daily     ondansetron  4 mg Oral At Bedtime     polyethylene glycol  17 g Oral Daily     ropivacaine 300 mg, EPINEPHrine 0.6 mg in saline 100 mL - customizable   INTRA-ARTICULAR On Call to OR     senna-docusate  1 tablet Oral BID     simvastatin  20 mg Oral At Bedtime     sodium chloride (PF)  3 mL Intracatheter Q8H     vancomycin  1,750 mg Intravenous Q24H     Vitamin D3  1,000 Units Oral Daily       Data   All microbiology laboratory data reviewed.  Recent Labs   Lab Test 12/25/22  0825 12/24/22  0713 12/23/22  0805 12/22/22 1827   WBC  --  9.6 11.9* 15.6*   HGB 9.3* 8.0* 8.5* 9.1*   HCT  --  24.9* 26.2* 26.9*   MCV  --  88 88 86   PLT  --  308 270 298     Recent Labs   Lab Test 12/24/22  0713 12/23/22  0805 12/22/22  1827   CR 1.21* 1.36* 1.51*     Component      Latest Ref Rng & Units 12/24/2022    Vancomycin      ug/mL 7.0     Microbiology  12/23 R finger tissue  Finger, Right; Tissue    0 Result Notes  Culture Culture in progress       1+ Staphylococcus aureus

## 2022-12-25 NOTE — PROGRESS NOTES
Orthopedic Surgery  Steve Morgan  DATE: 12/25/2022    Admit Date:  12/22/2022    A/P:  Steve Morgan is a 62 year old admitted on 12/22/2022.    #Right 3rd digit osteomyelitis s/p right long digit I&D and partial amputation, #2 Days Post-Op  No periop complications noted.  Ongoing swelling and residual erythema.   -  NWB Right hand.  OK for light /grasp  -   >> 88, reassuring.  -  Afebrile, VSS  -  Abx as per ID, currently Vanc   -  Daily dressing changes with RN - xeroform and gauze.   -  ELEVATE HAND!!!   -  PT/OT for evaluation of unsteady gait and walker modification - OK for platform walker.   -  No required DVT Ppx from Ortho standpoint.  Defer to IM.      #Disposition  -  Await therapy recommendations  -  Follow-up with Dr English in 5-7 days.      Cara Pedraza [facility]  [phone]  Text Page (7AM - 5PM)    _________________________________________________________    INTERVAL HISTORY:  No overnight events.  Resting comfortably in bed.          PHYSICAL EXAM:  Vital Signs: Temp: 98.3  F (36.8  C) Temp src: Oral BP: (!) 149/79 Pulse: 65   Resp: 16 SpO2: 97 % O2 Device: None (Room air)    I/O last 3 completed shifts:  In: -   Out: 650 [Urine:650]  Vitals:    12/22/22 1628 12/23/22 0036   Weight: 113.4 kg (250 lb) 110.9 kg (244 lb 6.4 oz)       Pleasant, cooperative.  Nontoxic.  NAD   RUE tremor  NCAT.  EOM grossly intact bilaterally. MMM  Respirations unlabored  Dressing c/d/i.  Just changed by RN. Well demarcated erythema at base of 3rd digit, query pressure related injury from tourniquet -  No streaking, difficulty with movement through MCP, TTP along MCP joint, lymphangitis spread, bright erythema, discharge, exudate. Sutures intact.   Degree of swelling and erythema expected.  Otherwise, full ROM of other finger joints and wrist.  Nontender.  No crepitus.                       LABORATORY DATA:  I reviewed all medications, new labs and imaging results over the last 24  hours.  Recent Labs   Lab Test 12/25/22  0825 12/25/22  0226 12/24/22  2207 12/24/22  1010 12/24/22  0713 12/23/22  0955 12/23/22  0805 12/23/22  0128 12/22/22  1827   * 129* 129*   < > 128*  128*   < > 127*  127*   < > 118*   POTASSIUM  --   --   --   --  4.1  --  4.1  --  4.7   BUN  --   --   --   --  12.2  --  15.0  --  16.7   CR  --   --   --   --  1.21*  --  1.36*  --  1.51*    < > = values in this interval not displayed.     Recent Labs   Lab Test 12/25/22  0825 12/24/22  0713 12/23/22  0805 12/22/22  1827 07/30/21  0708 01/16/20  0942   WBC  --  9.6 11.9* 15.6* 7.5 8.1   HGB 9.3* 8.0* 8.5* 9.1* 11.0* 11.9*   PLT  --  308 270 298 338 340     No lab results found.    Results for orders placed or performed during the hospital encounter of 12/22/22   Chest XR,  PA & LAT    Narrative    EXAM: XR CHEST 2 VIEWS  LOCATION: [facility]  DATE/TIME: 12/22/2022 4:55 PM    INDICATION: sob  COMPARISON: None.      Impression    IMPRESSION: Heart size is normal. Streaky opacities are present in both lung bases, atelectasis versus developing pneumonia. Follow-up advised.. Upper lobes are clear. No pneumothorax. No effusions. No pneumothorax.   XR Finger Right G/E 2 Views    Narrative    EXAM: XR FINGER RIGHT G/E 2 VIEWS  LOCATION: [facility]  DATE/TIME: 12/22/2022 7:21 PM    INDICATION: Right third finger pain and swelling.  COMPARISON: None.      Impression    IMPRESSION:     Marked soft tissue swelling throughout the right third finger. Severe flexion deformity and dislocation at the DIP joint, compatible with disruption of the common extensor tendon.     Erosion of the tuft of the distal phalanx is highly suspicious for osteomyelitis.     There is skin irregularity/ulceration in the tip of the finger, and there are a few small ossific fragments, which may be erosions related to osteomyelitis, or could represent an avulsion fracture related to prior tendon disruption. No  erosive change,   cortical defect, or focal lucency within the middle or proximal phalanges. The PIP and MCP joints are normally aligned.     Marked soft tissue swelling is also present throughout the dorsum of the hand. No other fracture or erosion. No other joint malalignment.

## 2022-12-26 ENCOUNTER — HEALTH MAINTENANCE LETTER (OUTPATIENT)
Age: 62
End: 2022-12-26

## 2022-12-26 ENCOUNTER — APPOINTMENT (OUTPATIENT)
Dept: PHYSICAL THERAPY | Facility: CLINIC | Age: 62
DRG: 854 | End: 2022-12-26
Attending: INTERNAL MEDICINE
Payer: MEDICARE

## 2022-12-26 LAB
ANION GAP SERPL CALCULATED.3IONS-SCNC: 11 MMOL/L (ref 7–15)
BACTERIA TISS BX CULT: ABNORMAL
BACTERIA TISS BX CULT: ABNORMAL
BUN SERPL-MCNC: 14 MG/DL (ref 8–23)
CALCIUM SERPL-MCNC: 9.3 MG/DL (ref 8.8–10.2)
CHLORIDE SERPL-SCNC: 96 MMOL/L (ref 98–107)
CREAT SERPL-MCNC: 1.31 MG/DL (ref 0.67–1.17)
CRP SERPL-MCNC: 61.53 MG/L
DEPRECATED HCO3 PLAS-SCNC: 23 MMOL/L (ref 22–29)
ERYTHROCYTE [DISTWIDTH] IN BLOOD BY AUTOMATED COUNT: 13.4 % (ref 10–15)
GFR SERPL CREATININE-BSD FRML MDRD: 61 ML/MIN/1.73M2
GLUCOSE BLDC GLUCOMTR-MCNC: 142 MG/DL (ref 70–99)
GLUCOSE BLDC GLUCOMTR-MCNC: 207 MG/DL (ref 70–99)
GLUCOSE BLDC GLUCOMTR-MCNC: 207 MG/DL (ref 70–99)
GLUCOSE BLDC GLUCOMTR-MCNC: 213 MG/DL (ref 70–99)
GLUCOSE BLDC GLUCOMTR-MCNC: 93 MG/DL (ref 70–99)
GLUCOSE SERPL-MCNC: 87 MG/DL (ref 70–99)
HCT VFR BLD AUTO: 25.9 % (ref 40–53)
HGB BLD-MCNC: 8.5 G/DL (ref 13.3–17.7)
MCH RBC QN AUTO: 28.4 PG (ref 26.5–33)
MCHC RBC AUTO-ENTMCNC: 32.8 G/DL (ref 31.5–36.5)
MCV RBC AUTO: 87 FL (ref 78–100)
OSMOLALITY UR: 201 MMOL/KG (ref 100–1200)
PLATELET # BLD AUTO: 374 10E3/UL (ref 150–450)
POTASSIUM SERPL-SCNC: 4.7 MMOL/L (ref 3.4–5.3)
RBC # BLD AUTO: 2.99 10E6/UL (ref 4.4–5.9)
SODIUM SERPL-SCNC: 124 MMOL/L (ref 136–145)
SODIUM SERPL-SCNC: 124 MMOL/L (ref 136–145)
SODIUM SERPL-SCNC: 125 MMOL/L (ref 136–145)
SODIUM SERPL-SCNC: 127 MMOL/L (ref 136–145)
SODIUM SERPL-SCNC: 129 MMOL/L (ref 136–145)
SODIUM SERPL-SCNC: 130 MMOL/L (ref 136–145)
SODIUM SERPL-SCNC: 130 MMOL/L (ref 136–145)
WBC # BLD AUTO: 9.6 10E3/UL (ref 4–11)

## 2022-12-26 PROCEDURE — 250N000011 HC RX IP 250 OP 636: Performed by: SPECIALIST

## 2022-12-26 PROCEDURE — 85027 COMPLETE CBC AUTOMATED: CPT | Performed by: INTERNAL MEDICINE

## 2022-12-26 PROCEDURE — 84295 ASSAY OF SERUM SODIUM: CPT | Performed by: INTERNAL MEDICINE

## 2022-12-26 PROCEDURE — 36415 COLL VENOUS BLD VENIPUNCTURE: CPT | Performed by: INTERNAL MEDICINE

## 2022-12-26 PROCEDURE — 83935 ASSAY OF URINE OSMOLALITY: CPT | Performed by: INTERNAL MEDICINE

## 2022-12-26 PROCEDURE — 250N000013 HC RX MED GY IP 250 OP 250 PS 637: Performed by: INTERNAL MEDICINE

## 2022-12-26 PROCEDURE — 250N000013 HC RX MED GY IP 250 OP 250 PS 637: Performed by: ORTHOPAEDIC SURGERY

## 2022-12-26 PROCEDURE — 99233 SBSQ HOSP IP/OBS HIGH 50: CPT | Performed by: INTERNAL MEDICINE

## 2022-12-26 PROCEDURE — 86140 C-REACTIVE PROTEIN: CPT | Performed by: PHYSICIAN ASSISTANT

## 2022-12-26 PROCEDURE — 250N000011 HC RX IP 250 OP 636: Performed by: INTERNAL MEDICINE

## 2022-12-26 PROCEDURE — 97530 THERAPEUTIC ACTIVITIES: CPT | Mod: GP | Performed by: PHYSICAL THERAPIST

## 2022-12-26 PROCEDURE — 97116 GAIT TRAINING THERAPY: CPT | Mod: GP | Performed by: PHYSICAL THERAPIST

## 2022-12-26 PROCEDURE — 99233 SBSQ HOSP IP/OBS HIGH 50: CPT | Performed by: SPECIALIST

## 2022-12-26 PROCEDURE — 97162 PT EVAL MOD COMPLEX 30 MIN: CPT | Mod: GP | Performed by: PHYSICAL THERAPIST

## 2022-12-26 PROCEDURE — 250N000009 HC RX 250: Performed by: INTERNAL MEDICINE

## 2022-12-26 PROCEDURE — 120N000001 HC R&B MED SURG/OB

## 2022-12-26 RX ORDER — CEFAZOLIN SODIUM 2 G/100ML
2 INJECTION, SOLUTION INTRAVENOUS EVERY 8 HOURS
Status: DISCONTINUED | OUTPATIENT
Start: 2022-12-26 | End: 2022-12-29 | Stop reason: HOSPADM

## 2022-12-26 RX ORDER — SODIUM CHLORIDE 9 MG/ML
INJECTION, SOLUTION INTRAVENOUS CONTINUOUS
Status: DISCONTINUED | OUTPATIENT
Start: 2022-12-26 | End: 2022-12-26

## 2022-12-26 RX ADMIN — ACETAMINOPHEN 650 MG: 325 TABLET, FILM COATED ORAL at 08:34

## 2022-12-26 RX ADMIN — ATENOLOL 25 MG: 25 TABLET ORAL at 08:36

## 2022-12-26 RX ADMIN — Medication 1000 UNITS: at 08:36

## 2022-12-26 RX ADMIN — CEFAZOLIN SODIUM 2 G: 2 INJECTION, SOLUTION INTRAVENOUS at 10:22

## 2022-12-26 RX ADMIN — CYANOCOBALAMIN TAB 500 MCG 250 MCG: 500 TAB at 08:39

## 2022-12-26 RX ADMIN — CEFAZOLIN SODIUM 2 G: 2 INJECTION, SOLUTION INTRAVENOUS at 17:55

## 2022-12-26 RX ADMIN — FAMOTIDINE 40 MG: 20 TABLET, FILM COATED ORAL at 08:34

## 2022-12-26 RX ADMIN — Medication 1 TABLET: at 08:34

## 2022-12-26 RX ADMIN — LATANOPROST 1 DROP: 50 SOLUTION/ DROPS OPHTHALMIC at 21:05

## 2022-12-26 RX ADMIN — LEVOTHYROXINE SODIUM 137 MCG: 0.11 TABLET ORAL at 08:35

## 2022-12-26 RX ADMIN — GLIPIZIDE 20 MG: 10 TABLET, EXTENDED RELEASE ORAL at 08:33

## 2022-12-26 RX ADMIN — LOSARTAN POTASSIUM 100 MG: 100 TABLET, FILM COATED ORAL at 08:36

## 2022-12-26 RX ADMIN — CITALOPRAM HYDROBROMIDE 40 MG: 20 TABLET ORAL at 08:39

## 2022-12-26 RX ADMIN — WATER: 1 INJECTION INTRAMUSCULAR; INTRAVENOUS; SUBCUTANEOUS at 16:39

## 2022-12-26 RX ADMIN — SIMVASTATIN 20 MG: 20 TABLET, FILM COATED ORAL at 21:03

## 2022-12-26 RX ADMIN — ONDANSETRON 4 MG: 4 TABLET, ORALLY DISINTEGRATING ORAL at 21:06

## 2022-12-26 RX ADMIN — SENNOSIDES AND DOCUSATE SODIUM 1 TABLET: 8.6; 5 TABLET ORAL at 21:03

## 2022-12-26 RX ADMIN — FERROUS SULFATE TAB 325 MG (65 MG ELEMENTAL FE) 325 MG: 325 (65 FE) TAB at 08:35

## 2022-12-26 RX ADMIN — BUPROPION HYDROCHLORIDE 150 MG: 150 TABLET, EXTENDED RELEASE ORAL at 21:03

## 2022-12-26 RX ADMIN — FINASTERIDE 5 MG: 5 TABLET, FILM COATED ORAL at 08:39

## 2022-12-26 ASSESSMENT — ACTIVITIES OF DAILY LIVING (ADL)
ADLS_ACUITY_SCORE: 30
ADLS_ACUITY_SCORE: 26
ADLS_ACUITY_SCORE: 30
ADLS_ACUITY_SCORE: 26
ADLS_ACUITY_SCORE: 30

## 2022-12-26 NOTE — PLAN OF CARE
Goal Outcome Evaluation:    [facility] cares [phone]. Pt A&Ox4. Denies pain or SOB. BP elevated 156/77. Rest of vitals stable. R hand wrapped in Kerlix and Ace wrap. CMS in R hand with no changes. Continues with neuropathy in all extremities, saying he doesn't have sensation in hands and feet. Patient having tremors, and gait unsteady, but saying this is a baseline. Assist of 1 with gaitbelt and walker.

## 2022-12-26 NOTE — PROGRESS NOTES
[facility]    Infectious Disease Progress Note    Date of Service : 12/26/2022     Assessment:  62-year-old male with chronic right third finger distal ulcer who has been hospitalized with worsening and evidence of osteomyelitis on imaging and is s/p I&D with partial amputation. Cxs are growing s.aureus MSSA and GpB strep. His finger appears very swollen with concern for septic tenosynovitis     -Right third finger chronic ulcer with osteomyelitis and concern for septic tenosynovitis  -CKD  -Hyponatremia  -History of chronic and recurrent hand and foot infections and wounds, followed in the wound care center.    -Neuropathy  -History of methicillin-resistant Staphylococcus aureus, including from fingers.  -Diabetes mellitus.  -Ongoing cough for the last 2 months.  Given antibiotics recently, but nothing for bacterial pneumonia and unlikely to be the case, probably post-COVID type cough, some fluid present so possibly cardiac        Recommendations:  1. Discontinue Vancomycin  2. Treat with Cefazolin  3. Consider MRI W/WO contrast of the right hand given significant swelling and erythema of right long finger  4. Monitor renal functions    Recommendations were discussed with the hospitalist service    Elizabeth Lester MD    Interval History   Resting, noted ongoing swelling of the right hand , pain and swelling. Has been frustrated with prolonged hospital stay. Creatinine slightly up today    Physical Exam   Temp: 97.6  F (36.4  C) Temp src: Axillary BP: (!) 148/70 Pulse: 65   Resp: 18 SpO2: 90 % O2 Device: None (Room air)    Vitals:    12/22/22 1628 12/23/22 0036   Weight: 113.4 kg (250 lb) 110.9 kg (244 lb 6.4 oz)     Vital Signs with Ranges  Temp:  [97.6  F (36.4  C)-98.8  F (37.1  C)] 97.6  F (36.4  C)  Pulse:  [62-65] 65  Resp:  [18] 18  BP: (148-156)/(64-77) 148/70  SpO2:  [90 %-95 %] 90 %    Constitutional: Awake, alert, cooperative, no apparent distress  Lungs: Clear to auscultation  bilaterally, no crackles or wheezing  Cardiovascular: S1S2  Abdomen: soft, non tender  Skin: No rash  MS : R hand in dressing    Other:    Medications       acetaminophen  975 mg Oral Q8H     atenolol  25 mg Oral Daily     buPROPion  150 mg Oral QPM     calcium carbonate-vitamin D  1 tablet Oral Daily     citalopram  40 mg Oral Daily     cyanocobalamin  250 mcg Oral Daily     famotidine  40 mg Oral Daily     ferrous sulfate  325 mg Oral Daily with breakfast     finasteride  5 mg Oral Daily     glipiZIDE  20 mg Oral Daily     insulin aspart  1-10 Units Subcutaneous TID AC     insulin aspart  1-7 Units Subcutaneous At Bedtime     insulin glargine  15 Units Subcutaneous Once     insulin glargine  30 Units Subcutaneous At Bedtime     latanoprost  1 drop Both Eyes At Bedtime     levothyroxine  137 mcg Oral Daily     losartan  100 mg Oral Daily     ondansetron  4 mg Oral At Bedtime     polyethylene glycol  17 g Oral Daily     ropivacaine 300 mg, EPINEPHrine 0.6 mg in saline 100 mL - customizable   INTRA-ARTICULAR On Call to OR     senna-docusate  1 tablet Oral BID     simvastatin  20 mg Oral At Bedtime     sodium chloride (PF)  3 mL Intracatheter Q8H     vancomycin  1,750 mg Intravenous Q24H     Vitamin D3  1,000 Units Oral Daily       Data   All microbiology laboratory data reviewed.  Recent Labs   Lab Test 12/26/22  0716 12/25/22  0825 12/24/22  0713 12/23/22  0805   WBC 9.6  --  9.6 11.9*   HGB 8.5* 9.3* 8.0* 8.5*   HCT 25.9*  --  24.9* 26.2*   MCV 87  --  88 88     --  308 270     Recent Labs   Lab Test 12/26/22  0716 12/24/22  0713 12/23/22  0805   CR 1.31* 1.21* 1.36*        Microbiology  12/23 right finger tissue  Finger, Right; Tissue    0 Result Notes  Culture No anaerobic organisms isolated after 1 day       1+ Streptococcus agalactiae (Group B Streptococcus) Abnormal           Finger, Right; Tissue    0 Result Notes  Culture Culture in progress       1+ Staphylococcus aureus Abnormal              Resulting Agency: [facility]     Susceptibility     Staphylococcus aureus     JACKIE     Clindamycin <=0.25 ug/mL Susceptible     Erythromycin <=0.25 ug/mL Susceptible     Gentamicin <=0.5 ug/mL Susceptible     Oxacillin 0.5 ug/mL Susceptible 1     Tetracycline <=1 ug/mL Susceptible     Trimethoprim/Sulfamethoxazole <=0.5/9.5 u... Susceptible     Vancomycin <=0.5 ug/mL Susceptible

## 2022-12-26 NOTE — PROGRESS NOTES
12/26/22 1220   Appointment Info   Signing Clinician's Name / Credentials (PT) Yelitza High DPT   Rehab Comments (PT) ISRAEL MAGDALENO, platform use ok   Living Environment   People in Home spouse   Current Living Arrangements house   Home Accessibility stairs to enter home;stairs within home   Number of Stairs, Main Entrance 2   Stair Railings, Main Entrance none   Number of Stairs, Within Home, Primary eight   Stair Railings, Within Home, Primary railings safe and in good condition   Transportation Anticipated car, drives self;family or friend will provide   Living Environment Comments Lives with spouse, pt is retired, spouse still works, she works from home x2days/wk, 3 days she has to go in person and is gone ~10hours/day; main level has kitchen and living room but to access to bathroom and somewhere to sleep, pt would need to go up or down 8 steps   Self-Care   Usual Activity Tolerance moderate   Current Activity Tolerance fair   Equipment Currently Used at Home cane, straight;walker, rolling  (just got 4WW)   Fall history within last six months yes   Number of times patient has fallen within last six months   (one or two)   Activity/Exercise/Self-Care Comment Just bought a 4WW for the community, uses SPC at home; typically IND with all ADLs and IADLs at baseline   General Information   Onset of Illness/Injury or Date of Surgery 12/22/22   Referring Physician Hira Alamo MD   Patient/Family Therapy Goals Statement (PT) to go home   Pertinent History of Current Problem (include personal factors and/or comorbidities that impact the POC) 62 year old male patient with past medical history of diabetes mellitus type 2, obstructive sleep apnea, diabetic neuropathy, morbid obesity, hypertension, hyperlipidemia, hypothyroidism, depression, who came to emergency room for evaluation for progressive cough, right hand wound and swelling. Patient stated that he has been having progressive cough since October. His cough  "got worse since Tuesday. He also stated that he had chronic wound of both hands due to neuropathy of his hands for which he follows with wound care nurse.  He stated that he developed swelling of right 3rd finger since 12/20/2022.  Now post-op I&D and partial amp of 3rd finger on R hand   Existing Precautions/Restrictions weight bearing   Weight-Bearing Status - RUE nonweight-bearing  (ok for platform and light pinch/grasp)   General Observations sitting in recliner, NAD, just got out of the shower with nrsg staff   Cognition   Affect/Mental Status (Cognition) WFL  (but admits he has some memory issues)   Orientation Status (Cognition) oriented x 3   Follows Commands (Cognition) follows one-step commands;75-90% accuracy   Pain Assessment   Patient Currently in Pain No   Posture    Posture Forward head position;Protracted shoulders   Range of Motion (ROM)   ROM Comment BLE ROM is WFL, BUE is WFL except did not formally assess R hand/wrist d/t bandages   Strength (Manual Muscle Testing)   Strength (Manual Muscle Testing) Able to perform R SLR;Able to perform L SLR   Bed Mobility   Comment, (Bed Mobility) not assessed, in recliner at beginning and end of session   Transfers   Transfers sit-stand transfer   Sit-Stand Transfer   Sit-Stand [place] (Transfers) contact guard;verbal cues   Assistive Device (Sit-Stand Transfers) walker, front-wheeled;walker, platform   Comment, (Sit-Stand Transfer) needs cues to avoid pushing through R hand   Gait/Stairs (Locomotion)   [place] Level (Gait) contact guard   Assistive Device (Gait) walker, rolling platform   Distance in Feet 5' for eval, add'l 40' for treat   Pattern (Gait) step-through   Deviations/Abnormal Patterns (Gait) base of support, wide;ataxic;gait speed decreased   Balance   Balance Comments Fair, reports falls at baseline   Sensory Examination   Sensory Perception Comments Pt reports he can feel some pressure in his B heels and \"maybe I would feel if you " "stuck a pin in my foot\" otherwise reports numbness in feet and hands   Clinical Impression   Criteria for Skilled Therapeutic Intervention Yes, treatment indicated   PT Diagnosis (PT) decreased functional mobility   Influenced by the following impairments weakness, decreased sensation contributing to poor balance   Functional limitations due to impairments bed mobility, transfers, ambulation, stair climbing   Clinical Presentation (PT Evaluation Complexity) Evolving/Changing   Clinical Presentation Rationale clinical judgement, PLOF, social support, ongoing work up for infection   Clinical Decision Making (Complexity) moderate complexity   Planned Therapy Interventions (PT) balance training;bed mobility training;gait training;home exercise program;neuromuscular re-education;patient/family education;stair training;strengthening;transfer training;progressive activity/exercise;risk factor education;home program guidelines   Anticipated Equipment Needs at Discharge (PT) walker, rolling platform   Risk & Benefits of therapy have been explained evaluation/treatment results reviewed;care plan/treatment goals reviewed;risks/benefits reviewed;current/potential barriers reviewed;participants voiced agreement with care plan;participants included;patient   PT Total Evaluation Time   PT Eval, Moderate Complexity Minutes (54002) 15   Plan of Care Review   Plan of Care Reviewed With patient   Physical Therapy Goals   PT Frequency Daily   PT Predicted Duration/Target Date for Goal Attainment 01/02/23   PT Goals Bed Mobility;Transfers;Gait;Stairs   PT: Bed Mobility Independent;Supine to/from sit;Within precautions   PT: Transfers Modified independent;Sit to/from stand;Bed to/from chair;Assistive device;Within precautions   PT: Gait Modified independent;Rolling walker;Within precautions;50 feet   PT: Stairs Modified independent;Within precautions;8 stairs  (with 1 railing)   PT Discharge Planning   PT Plan progress ambulation " distance with platform walker, trial SPC if/when appropriate, assess bed mobility, review NWB precautions   PT Discharge Recommendation (DC Rec) Transitional Care Facility   PT Rationale for DC Rec Currently rec TCU for strengthening, balance, and gait.  Pt lives with spouse but she is out of the home x10hrs/day x3 days/wk; during this time, pt would need to be IND on stairs to access bathroom or kitchen (depending on what level he sleeps on)   PT Brief overview of current status CGA with platform walker

## 2022-12-26 NOTE — PROGRESS NOTES
Orthopedic Surgery  Steve Morgan  12/26/2022     Admit Date:  12/22/2022    POD: 3 Days Post-Op   Procedure(s):  Right long finger irrigation and debridement with partial amputation of the right long finger.     Patient resting comfortably in bed.    Pain is well-controlled and minimal.  Swelling in other digits is improved.  Dressing intact to right hand. Patient requesting shower and dressing will be changed afterwards.   RN completing dressing changes daily and PRN.  Tolerating oral intake.    Denies nausea or vomiting.  Denies chest pain or shortness of breath.  No acute events overnight.    Temp:  [97.6  F (36.4  C)-98.8  F (37.1  C)] 97.6  F (36.4  C)  Pulse:  [62-65] 65  Resp:  [18] 18  BP: (148-156)/(64-77) 148/70  SpO2:  [90 %-95 %] 90 %    Alert and oriented.  Right hand dressing is clean, dry, and intact.  Reviewed pictures from yesterday in chart.  Moved dressing around to view palmar aspect of hand and wrist/forearm.  No expanding erythema in these areas.   Mild swelling of unincorporated digits. Dressing not taken down currently to address middle finger swelling.   Able to move thumb, index, ring, and small digits with mild stiffness.  Forearm nontender.   Radial pulse palpable.   Digits are warm and well-perfused.  Sensation intact to light touch.    Labs:  Recent Labs   Lab Test 12/26/22  0716 12/25/22  0825 12/24/22  0713 12/23/22  0805   WBC 9.6  --  9.6 11.9*   HGB 8.5* 9.3* 8.0* 8.5*     --  308 270     No lab results found.  Recent Labs   Lab Test 12/26/22  0716 12/25/22  0825 12/24/22  0713   CRP 61.53* 88.76* 163.91*     Intraoperative cultures: 1+ Staphylococcus aureus (pan sensitive)    A/P    1. Right long finger irrigation and debridement with partial amputation of the right long finger   No DVT prophylaxis from hand surgery standpoint.   Per ID note, vancomycin discontinued and started Ancef on 12/26/22.   Mobilize with PT/OT    NWBing right hand. Okay to use platform walker and  weight bear through forearm.   Continue current pain regiment.   Daily dressing changes and PRN dressing changes by nurse if saturation occurs. Please contact orthopedics if any questions or concerns, but exam stable as of today without increasing pain or expanding erythema.   Follow-up: 2 weeks post-op with Dr. Colby English    2. Disposition   Anticipate d/c to home when medically cleared.    Bekah Khan PA-C  [facility]

## 2022-12-26 NOTE — PLAN OF CARE
Vitals: Slightly elevated BP, otherwise stable  Neuro: A/Ox4  Cardiac: normal rate and rhythm  GI: no n/v, no BM  : voiding well  Resp: on room air, no SOB  Activity: Ax1 with walker to bathroom, slightly unsteady with gait  Pain: complained of some pain on R hand  - PRN Oxycodone and scheduledTylenol given  Skin: R hand covered with dressing - changed by Ortho from previous shift, CDI     Plan: Vancomycin, elevate R hand, monitor and daily dressing

## 2022-12-26 NOTE — PROGRESS NOTES
End of Shift Summary  For vital signs and complete assessments, please see documentation flowsheets.     Pertinent assessments: A&O x4. VSS. LS clear. HS regular. Mild R finger pain, PRN tylenol given. Up A1 w/ gait belt & platform walker. NWB to R hand. Dressing changed. Shower given.     Major Shift Events: Ancef for antibiotic     Treatment Plan: Pain management, encourage ambulation, NWB to R hand, BS checks, dressing changed, MRI tomorrow, PT/OT     Bedside Nurse: Lisa Stapleton RN

## 2022-12-26 NOTE — PROGRESS NOTES
[facility] [place]  Hospitalist Progress Note  Jerald Reese MD.     12/26/2022    Reason for Stay/active problem list    Severe hand infection with Osteomyelitis of right third finger    Hyponatremia     [place]          Assessment and Plan:        Summary of Stay:   Steve Morgan is a 62 year old male patient with past medical history of diabetes mellitus type 2, obstructive sleep apnea, diabetic neuropathy, morbid obesity, hypertension, hyperlipidemia, hypothyroidism, depression, who came to emergency room for evaluation for progressive cough, right hand wound and swelling. Patient stated that he has been having progressive cough since October. His cough got worse since Tuesday. He also stated that he had chronic wound of both hands due to neuropathy of his hands for which he follows with wound care nurse.  He stated that he developed swelling of right 3rd finger since 12/20/2022.  He developed erythema of right third finger day of admission.  He came to emergency room for evaluation.    On arrival to emergency room, his vital signs were checked and showed temperature 98.8, pulse 103, blood pressure 174/92, oxygen saturation 97% on room air.    Laboratory work-up showed sodium 118, potassium 4.7, creatinine 1.51, lactic acid 0.9, WBC 15.6, hemoglobin 9.1.    Chest x-ray showed streaky opacities in both lungs suspicious for atelectasis versus developing pneumonia.  X-ray of the right hand was done and showed marked soft tissue swelling throughout the right third finger, severe flexion deformity and suspicious for osteomyelitis.    In emergency room, he was given IV fluid.  He was also started on IV Zosyn and Vancomycin.  He was admitted to the hospital for further management.      Problem List with Assessment and Plan:  Osteomyelitis of right third finger  History of diabetic neuropathy with hand deformities  Chronic hand wound  -Patient has multiple wounds on both hands with deformity of bilateral fingers.   He has swelling and erythema of right third finger.  X-ray of the right hand concerning for osteomyelitis.  -He was started on IV Zosyn and vancomycin in the ER, then changed to Ceftriaxone and Vancomycin, then Rocephin was stopped and continued on vancomycin alone.   -ID consulted input appreciated, culture from the site came back positive for MSSA, antibiotic changed to Ancef 12/26.  -Orthopedic surgery was consulted and did Irrigation and debridement with a partial amputation right long finger on 12/23/22.  -Currently he is afebrile, he is right hand wound is dressed.    -Wound care and assessment per orthopedic service.  -Noted ID recommendation for MRI of the hand with/without, will discuss with orthopedic service as to the timing to do the imaging studies.  Anticipate daily follow-up by orthopedic surgery team.      Cough, resolving no sign of pneumonia.  -He does have intermittent cough which worsened in last few days   -Treated with Augmentin as an outpatient, continue to have intermittent cough.  -Chest x-ray showed streaky opacities in both lungs which could represent likely atelectasis than pneumonia.    Hyponatremia:  -Sodium level was 118 on arrival, improved-today is 129.  -History of low sodium.  -His baseline sodium level is 127.  -Discontinue IV fluid, monitor sodium for now.  -See addendum below    [place] on CKD.  -Baseline creatinine around 1.01-1.08.  Creatinine 1.51 on admission, trended down to 1.31 today.  -It was .21 on December 24, continue to monitor kidney function.  -Avoid nephrotoxic medications.     DM:  -Continue basal insulin, sliding scale insulin.  -Metformin on hold, glipizide 20 mg was restarted.  -Glucose is fairly controlled, latest level is 93.  -On Lantus insulin 30 units subcu daily.   -Blood glucose has been variable but the control is improving.  Currently back on his 30 units of Lantus, sliding scale insulin, oral glipizide.  Continue to hold metformin and monitor blood sugar  "closely and titrate insulin as needed    Hypertension:  -Resumed PTA medication-atenolol.  Losartan on hold due to acute kidney injury    Addendum.  Patient sodium trended down, to 124 this afternoon.  Briefly discussed with nephrology, will repeat urine osmolality, start 2% sodium chloride at 15 ml/hr for now.  Ordered fluid restriction  Check sodium level tonight.  Call MD with results      VTE Prophylaxis: Pneumatic Compression Devices  Code Status: Full Code  Diet: Advance Diet as Tolerated: Regular Diet Adult    [place] Catheter: Not present       Disposition: Expect at least 2 more days in the hospital for IV antibiotic, may need further imaging study with MRI per ID recommendation, will discuss with orthopedic service as to the timing. Orthopedic surgery and infectious disease service following.      I discussed with patient the plan of care, all his questions and concerns addressed        Interval History (Subjective):      Patient seen and examined, assumed care today, no new overnight issues, feels better, no fever or chills.  Pain is fairly controlled, wound is dressed.                Physical Exam:        Last Vital Signs:  BP (!) 148/70 (BP Location: Right arm)   Pulse 65   Temp 97.6  F (36.4  C) (Axillary)   Resp 18   Ht 1.651 m (5' 5\")   Wt 110.9 kg (244 lb 6.4 oz)   SpO2 90%   BMI 40.67 kg/m      I/O last 3 completed shifts:  In: 840 [P.O.:840]  Out: -     Wt Readings from Last 5 Encounters:   12/23/22 110.9 kg (244 lb 6.4 oz)   06/15/22 111.1 kg (245 lb)   11/16/21 111.1 kg (245 lb)   09/24/21 109.3 kg (241 lb)   08/09/21 112 kg (247 lb)      Constitutional: Awake, alert, cooperative, no apparent distress     Respiratory: Clear to auscultation bilaterally, no crackles or wheezing   Cardiovascular: Regular rate and rhythm, normal S1 and S2, and no murmur noted   Abdomen: Normal bowel sounds, soft, non-distended, non-tender   Skin: No new rashes, no cyanosis, dry to touch   Neuro: Alert with  no " new focal weakness   Extremities: Dressed right hand    Other(s):        All other systems: Negative.          Medications:      All current medications were reviewed with changes reflected in problem list.  Current Facility-Administered Medications   Medication     acetaminophen (TYLENOL) tablet 650 mg     atenolol (TENORMIN) tablet 25 mg     benzocaine-menthol (CHLORASEPTIC) 6-10 MG lozenge 1 lozenge     bisacodyl (DULCOLAX) suppository 10 mg     buPROPion (WELLBUTRIN XL) 24 hr tablet 150 mg     calcium carbonate-vitamin D (OSCAL) 500-5 MG-MCG per tablet 1 tablet     ceFAZolin (ANCEF) 2 g in 100 mL D5W intermittent infusion     citalopram (celeXA) tablet 40 mg     cyanocobalamin (VITAMIN B-12) half-tab 250 mcg     glucose gel 15-30 g    Or     dextrose 50 % injection 25-50 mL    Or     glucagon injection 1 mg     famotidine (PEPCID) tablet 40 mg     ferrous sulfate (FEROSUL) tablet 325 mg     finasteride (PROSCAR) tablet 5 mg     fluticasone (FLONASE) 50 MCG/ACT spray 1 spray     glipiZIDE (GLUCOTROL XL) 24 hr tablet 20 mg     HYDROmorphone (DILAUDID) injection 0.2 mg    Or     HYDROmorphone (DILAUDID) injection 0.4 mg     insulin aspart (NovoLOG) injection (RAPID ACTING)     insulin aspart (NovoLOG) injection (RAPID ACTING)     insulin glargine (LANTUS PEN) injection 15 Units     insulin glargine (LANTUS PEN) injection 30 Units     latanoprost (XALATAN) 0.005 % ophthalmic solution 1 drop     levothyroxine (SYNTHROID/LEVOTHROID) tablet 137 mcg     lidocaine (LMX4) cream     lidocaine 1 % 0.1-1 mL     losartan (COZAAR) tablet 100 mg     magnesium hydroxide (MILK OF MAGNESIA) suspension 30 mL     melatonin tablet 1 mg     naloxone (NARCAN) injection 0.2 mg    Or     naloxone (NARCAN) injection 0.4 mg    Or     naloxone (NARCAN) injection 0.2 mg    Or     naloxone (NARCAN) injection 0.4 mg     ondansetron (ZOFRAN ODT) ODT tab 4 mg     ondansetron (ZOFRAN ODT) ODT tab 4 mg    Or     ondansetron (ZOFRAN) injection 4 mg      oxyCODONE (ROXICODONE) tablet 5 mg    Or     oxyCODONE (ROXICODONE) tablet 10 mg     pantoprazole (PROTONIX) EC tablet 40 mg     polyethylene glycol (MIRALAX) Packet 17 g     prochlorperazine (COMPAZINE) injection 10 mg    Or     prochlorperazine (COMPAZINE) tablet 10 mg     ropivacaine (NAROPIN) 300 mg, EPINEPHrine (ADRENALIN) 0.6 mg in sodium chloride 0.9 % 100 mL (ORTHO EDNA CUSTOM DOSE)     senna-docusate (SENOKOT-S/PERICOLACE) 8.6-50 MG per tablet 1 tablet     senna-docusate (SENOKOT-S/PERICOLACE) 8.6-50 MG per tablet 1 tablet    Or     senna-docusate (SENOKOT-S/PERICOLACE) 8.6-50 MG per tablet 2 tablet     simvastatin (ZOCOR) tablet 20 mg     sodium chloride (PF) 0.9% PF flush 3 mL     sodium chloride (PF) 0.9% PF flush 3 mL     Vitamin D3 (CHOLECALCIFEROL) tablet 1,000 Units          Data:      All new lab and imaging data was reviewed.    Data reviewed today: I reviewed all new labs and imaging results over the last 24 hours. I personally reviewed   Recent Labs   Lab 12/26/22  0716 12/25/22  0825 12/24/22  0713 12/23/22  0805   WBC 9.6  --  9.6 11.9*   HGB 8.5* 9.3* 8.0* 8.5*   HCT 25.9*  --  24.9* 26.2*   MCV 87  --  88 88     --  308 270     No results for input(s): CULT in the last 168 hours.  Recent Labs   Lab 12/26/22  0755 12/26/22  0716 12/26/22  0221 12/26/22  0039 12/25/22  2153 12/24/22  0724 12/24/22  0713 12/23/22  0906 12/23/22  0805 12/23/22  0128 12/22/22  1827   NA  --  129*  130*  130*  --  127* 126*   < > 128*  128*   < > 127*  127*   < > 118*   POTASSIUM  --  4.7  --   --   --   --  4.1  --  4.1  --  4.7   CHLORIDE  --  96*  --   --   --   --  94*  --  93*  --  82*   CO2  --  23  --   --   --   --  21*  --  20*  --  22   ANIONGAP  --  11  --   --   --   --  13  --  14  --  14   GLC 93 87 142*  --   --    < > 149*   < > 168*   < > 253*   BUN  --  14.0  --   --   --   --  12.2  --  15.0  --  16.7   CR  --  1.31*  --   --   --   --  1.21*  --  1.36*  --  1.51*   GFRESTIMATED  --   61  --   --   --   --  67  --  58*  --  51*   CHAR  --  9.3  --   --   --   --  8.6*  --  8.0*  --  8.3*   PROTTOTAL  --   --   --   --   --   --   --   --   --   --  7.7   ALBUMIN  --   --   --   --   --   --   --   --   --   --  3.8   BILITOTAL  --   --   --   --   --   --   --   --   --   --  0.8   ALKPHOS  --   --   --   --   --   --   --   --   --   --  115   AST  --   --   --   --   --   --   --   --   --   --  36   ALT  --   --   --   --   --   --   --   --   --   --  38    < > = values in this interval not displayed.       Recent Labs   Lab 12/26/22  0755 12/26/22  0716 12/26/22  0221 12/25/22  2104 12/25/22  1713   GLC 93 87 142* 190* 222*     No results for input(s): INR in the last 168 hours.    No results for input(s): TROPONIN, TROPI, TROPR in the last 168 hours.    Invalid input(s): TROP, TROPONINIES    Recent Results (from the past 48 hour(s))   Chest XR,  PA & LAT    Narrative    EXAM: XR CHEST 2 VIEWS  LOCATION: [facility]  DATE/TIME: 12/22/2022 4:55 PM    INDICATION: sob  COMPARISON: None.      Impression    IMPRESSION: Heart size is normal. Streaky opacities are present in both lung bases, atelectasis versus developing pneumonia. Follow-up advised.. Upper lobes are clear. No pneumothorax. No effusions. No pneumothorax.   XR Finger Right G/E 2 Views    Narrative    EXAM: XR FINGER RIGHT G/E 2 VIEWS  LOCATION: [facility]  DATE/TIME: 12/22/2022 7:21 PM    INDICATION: Right third finger pain and swelling.  COMPARISON: None.      Impression    IMPRESSION:     Marked soft tissue swelling throughout the right third finger. Severe flexion deformity and dislocation at the DIP joint, compatible with disruption of the common extensor tendon.     Erosion of the tuft of the distal phalanx is highly suspicious for osteomyelitis.     There is skin irregularity/ulceration in the tip of the finger, and there are a few small ossific fragments, which may be erosions  related to osteomyelitis, or could represent an avulsion fracture related to prior tendon disruption. No erosive change,   cortical defect, or focal lucency within the middle or proximal phalanges. The PIP and MCP joints are normally aligned.     Marked soft tissue swelling is also present throughout the dorsum of the hand. No other fracture or erosion. No other joint malalignment.       COVID Status:  COVID-19 PCR Results    COVID-19 PCR Results 12/22/22   SARS CoV2 PCR Negative      Comments are available for some flowsheets but are not being displayed.         COVID-19 Antibody Results, Testing for Immunity    COVID-19 Antibody Results, Testing for Immunity   No data to display.

## 2022-12-27 ENCOUNTER — APPOINTMENT (OUTPATIENT)
Dept: PHYSICAL THERAPY | Facility: CLINIC | Age: 62
DRG: 854 | End: 2022-12-27
Payer: MEDICARE

## 2022-12-27 ENCOUNTER — APPOINTMENT (OUTPATIENT)
Dept: OCCUPATIONAL THERAPY | Facility: CLINIC | Age: 62
DRG: 854 | End: 2022-12-27
Attending: INTERNAL MEDICINE
Payer: MEDICARE

## 2022-12-27 LAB
BACTERIA BLD CULT: NO GROWTH
CRP SERPL-MCNC: 44.28 MG/L
GLUCOSE BLDC GLUCOMTR-MCNC: 133 MG/DL (ref 70–99)
GLUCOSE BLDC GLUCOMTR-MCNC: 152 MG/DL (ref 70–99)
GLUCOSE BLDC GLUCOMTR-MCNC: 252 MG/DL (ref 70–99)
GLUCOSE BLDC GLUCOMTR-MCNC: 256 MG/DL (ref 70–99)
GLUCOSE BLDC GLUCOMTR-MCNC: 84 MG/DL (ref 70–99)
SODIUM SERPL-SCNC: 127 MMOL/L (ref 136–145)

## 2022-12-27 PROCEDURE — 84295 ASSAY OF SERUM SODIUM: CPT | Performed by: INTERNAL MEDICINE

## 2022-12-27 PROCEDURE — 84295 ASSAY OF SERUM SODIUM: CPT | Performed by: HOSPITALIST

## 2022-12-27 PROCEDURE — 250N000011 HC RX IP 250 OP 636: Performed by: SPECIALIST

## 2022-12-27 PROCEDURE — 36415 COLL VENOUS BLD VENIPUNCTURE: CPT | Performed by: INTERNAL MEDICINE

## 2022-12-27 PROCEDURE — 250N000013 HC RX MED GY IP 250 OP 250 PS 637: Performed by: ORTHOPAEDIC SURGERY

## 2022-12-27 PROCEDURE — 97165 OT EVAL LOW COMPLEX 30 MIN: CPT | Mod: GO

## 2022-12-27 PROCEDURE — 120N000001 HC R&B MED SURG/OB

## 2022-12-27 PROCEDURE — 272N000748 HC KIT, CATH 3FR OR 4FR SINGLE LUMEN POWERMIDLINE

## 2022-12-27 PROCEDURE — 99232 SBSQ HOSP IP/OBS MODERATE 35: CPT | Performed by: INTERNAL MEDICINE

## 2022-12-27 PROCEDURE — 36569 INSJ PICC 5 YR+ W/O IMAGING: CPT

## 2022-12-27 PROCEDURE — 97116 GAIT TRAINING THERAPY: CPT | Mod: GP | Performed by: PHYSICAL THERAPIST

## 2022-12-27 PROCEDURE — 250N000013 HC RX MED GY IP 250 OP 250 PS 637: Performed by: INTERNAL MEDICINE

## 2022-12-27 PROCEDURE — 97535 SELF CARE MNGMENT TRAINING: CPT | Mod: GO

## 2022-12-27 PROCEDURE — 36415 COLL VENOUS BLD VENIPUNCTURE: CPT | Performed by: PHYSICIAN ASSISTANT

## 2022-12-27 PROCEDURE — 86140 C-REACTIVE PROTEIN: CPT | Performed by: PHYSICIAN ASSISTANT

## 2022-12-27 PROCEDURE — 36415 COLL VENOUS BLD VENIPUNCTURE: CPT | Performed by: HOSPITALIST

## 2022-12-27 PROCEDURE — 97530 THERAPEUTIC ACTIVITIES: CPT | Mod: GP | Performed by: PHYSICAL THERAPIST

## 2022-12-27 PROCEDURE — 99233 SBSQ HOSP IP/OBS HIGH 50: CPT | Performed by: INTERNAL MEDICINE

## 2022-12-27 RX ORDER — SODIUM CHLORIDE 1 G/1
1 TABLET ORAL 2 TIMES DAILY WITH MEALS
Status: DISCONTINUED | OUTPATIENT
Start: 2022-12-27 | End: 2022-12-29 | Stop reason: HOSPADM

## 2022-12-27 RX ADMIN — ACETAMINOPHEN 650 MG: 325 TABLET, FILM COATED ORAL at 02:55

## 2022-12-27 RX ADMIN — CYANOCOBALAMIN TAB 500 MCG 250 MCG: 500 TAB at 10:41

## 2022-12-27 RX ADMIN — BUPROPION HYDROCHLORIDE 150 MG: 150 TABLET, EXTENDED RELEASE ORAL at 21:18

## 2022-12-27 RX ADMIN — LATANOPROST 1 DROP: 50 SOLUTION/ DROPS OPHTHALMIC at 21:20

## 2022-12-27 RX ADMIN — FERROUS SULFATE TAB 325 MG (65 MG ELEMENTAL FE) 325 MG: 325 (65 FE) TAB at 10:41

## 2022-12-27 RX ADMIN — CITALOPRAM HYDROBROMIDE 40 MG: 20 TABLET ORAL at 10:41

## 2022-12-27 RX ADMIN — LEVOTHYROXINE SODIUM 137 MCG: 0.11 TABLET ORAL at 10:41

## 2022-12-27 RX ADMIN — GLIPIZIDE 20 MG: 10 TABLET, EXTENDED RELEASE ORAL at 10:40

## 2022-12-27 RX ADMIN — FAMOTIDINE 40 MG: 20 TABLET, FILM COATED ORAL at 10:40

## 2022-12-27 RX ADMIN — CEFAZOLIN SODIUM 2 G: 2 INJECTION, SOLUTION INTRAVENOUS at 19:38

## 2022-12-27 RX ADMIN — CEFAZOLIN SODIUM 2 G: 2 INJECTION, SOLUTION INTRAVENOUS at 12:16

## 2022-12-27 RX ADMIN — SODIUM CHLORIDE 1 G: 1 TABLET ORAL at 12:16

## 2022-12-27 RX ADMIN — FINASTERIDE 5 MG: 5 TABLET, FILM COATED ORAL at 10:41

## 2022-12-27 RX ADMIN — Medication 1 TABLET: at 10:41

## 2022-12-27 RX ADMIN — ATENOLOL 25 MG: 25 TABLET ORAL at 10:40

## 2022-12-27 RX ADMIN — ACETAMINOPHEN 650 MG: 325 TABLET, FILM COATED ORAL at 12:24

## 2022-12-27 RX ADMIN — SODIUM CHLORIDE 1 G: 1 TABLET ORAL at 18:28

## 2022-12-27 RX ADMIN — SIMVASTATIN 20 MG: 20 TABLET, FILM COATED ORAL at 21:18

## 2022-12-27 RX ADMIN — CEFAZOLIN SODIUM 2 G: 2 INJECTION, SOLUTION INTRAVENOUS at 00:34

## 2022-12-27 RX ADMIN — Medication 1000 UNITS: at 10:43

## 2022-12-27 RX ADMIN — LOSARTAN POTASSIUM 100 MG: 100 TABLET, FILM COATED ORAL at 10:41

## 2022-12-27 ASSESSMENT — ACTIVITIES OF DAILY LIVING (ADL)
ADLS_ACUITY_SCORE: 30
ADLS_ACUITY_SCORE: 30
ADLS_ACUITY_SCORE: 26
ADLS_ACUITY_SCORE: 26
ADLS_ACUITY_SCORE: 30

## 2022-12-27 NOTE — PLAN OF CARE
Vitals: Slightly elevated BP, otherwise stable  Neuro: A/Ox4  Cardiac: normal rate and rhythm  GI: no n/v, BMx1  : voiding well  Resp: on room air, no SOB  Activity: Ax1 with platform walker to bathroom  Pain: complained of some pain on R hand  - PRN Oxycodone and scheduledTylenol given  Skin: R hand covered with dressing - changed by Ortho from previous shift, CDI     Plan: Ancef, elevate R hand, monitor and daily dressing, monitor Sodium

## 2022-12-27 NOTE — PROGRESS NOTES
Comfortable AF  Wound with dry eschar, no drainage, no fluctuance,  Swelling down, redness less, no tenderness or fluctuance in palm, moving fingers well    Healing as expected, continue local wound care and antibiotics    SLO

## 2022-12-27 NOTE — PROGRESS NOTES
Notified re sodium result of 124. Stable from this AM.  Urine studies already obtained and suspected SIADH.  Currently on 2% NS @ 15 ml/hr.  Will increase rate to 30 ml/hr and recheck sodium in 4 hours.  Page MD with results.    Addendum: Repeat sodium 127.  We will back down on the rate of hypertonic back to 15 mils per hour continue every 4 sodium checks.    Saad Weeks MD

## 2022-12-27 NOTE — CONSULTS
Care Management Initial Consult    General Information  Assessment completed with: Patient, Spouse or significant other,    Type of CM/SW Visit: Initial Assessment    Primary Care Provider verified and updated as needed:     Readmission within the last 30 days:           Advance Care Planning:            Communication Assessment  Patient's communication style: spoken language (English or Bilingual)    Hearing Difficulty or Deaf: no   Wear Glasses or Blind: yes    Cognitive  Cognitive/Neuro/Behavioral: WDL  Level of Consciousness: alert     Orientation: oriented x 4             Living Environment:   People in home: spouse     Current living Arrangements: house      Able to return to prior arrangements: no       Family/Social Support:  Care provided by: self, spouse/significant other  Provides care for: no one  Marital Status:   Wife          Description of Support System: Supportive, Involved         Current Resources:   Patient receiving home care services: No     Community Resources: None  Equipment currently used at home: cane, straight, walker, rolling (4WW)  Supplies currently used at home:      Employment/Financial:  Employment Status:          Financial Concerns:             Lifestyle & Psychosocial Needs:  Social Determinants of Health     Tobacco Use: Low Risk      Smoking Tobacco Use: Never     Smokeless Tobacco Use: Never     Passive Exposure: Not on file   Alcohol Use: Not At Risk     Frequency of Alcohol Consumption: Never     Average Number of Drinks: Patient does not drink     Frequency of Binge Drinking: Never   Financial Resource Strain: Low Risk      Difficulty of Paying Living Expenses: Not hard at all   Food Insecurity: No Food Insecurity     Worried About Running Out of Food in the Last Year: Never true     Ran Out of Food in the Last Year: Never true   Transportation Needs: No Transportation Needs     Lack of Transportation (Medical): No     Lack of Transportation (Non-Medical): No  "  Physical Activity: Inactive     Days of Exercise per Week: 0 days     Minutes of Exercise per Session: 0 min   Stress: No Stress Concern Present     Feeling of Stress : Not at all   Social Connections: Moderately Integrated     Frequency of Communication with Friends and Family: Once a week     Frequency of Social Gatherings with Friends and Family: Once a week     Attends Jewish Services: 1 to 4 times per year     Active Member of Clubs or Organizations: Yes     Attends Club or Organization Meetings: Not on file     Marital Status:    Intimate Partner Violence: Not on file   Depression: Not at risk     PHQ-2 Score: 2   Housing Stability: Low Risk      Unable to Pay for Housing in the Last Year: No     Number of Places Lived in the Last Year: 1     Unstable Housing in the Last Year: No       Functional Status:  Prior to admission patient needed assistance:   Dependent ADLs:: Ambulation-cane          Mental Health Status:  Mental Health Status: No Current Concerns       Additional Information:  Consult for discharge planning. Patient admitted with an infected right third finger that has required surgical intervention, hyponatremia and [place].    Met with patient and spouse at bedside to introduce CM role and offer discharge planning. Currently PT/OT are recommending TCU. Patient does have chronic neuropathy and does have some weakness and gait instability at baseline however he feels that this has been getting worse. He had a recent cough that was also causing some SOB limiting his mobility. His wife relates that his diet is poor and his diabetes has not been in good control. She also feels that since a job loss years ago he has struggled with depression. Patient verbalizes understanding of the severity of his conditions and has been doing \"some soul searching\" while laying in bed here.    We discussed PT/OT concerns and the issues with stairs being a barrier to going home. They could set up the family room " for him to be in during the day but this would still require stairs to get to. We discussed home PT/OT vs TCU and what each would look like. Patient has some self awareness into his level of self motivation as well as the mobility concerns and feels that TCU may be the best option. If he went home he feels he may be to sedentary. He has several goals that he wants to work towards such as a vacation in February and his daughters wedding next year.     Patient understands that he is in control of how well he does in the future. Went over SNF packet and asked that they decide on several TCU choices for us to send referrals to. They will review and CM will check in tomorrow for choices.     Will continue to follow all disciplines recommendations and assist in discharge planning.    Addendum 1550:  ID notes indicate that patient will now need at least 2 weeks of IV antibiotics. Spoke with patient and spouse after seen by ID. Discussed that TCU can do IV antibiotics and if discharges home then patient and spouse would be taught how to administer at home. They feel that TCU will probably be the best option with this added information. They will provide choices.    Fatuma Carballo RN BSN OCN  Care Coordinator  [facility]  [phone]

## 2022-12-27 NOTE — PROVIDER NOTIFICATION
Dr. Reese paged- NaCl 2% IVF order /discontinued this AM. Na remains low. Would you like patient to remain on IVF or just NaCl tablet you ordered? Thanks.    Fiona Kaye RN

## 2022-12-27 NOTE — PROGRESS NOTES
12/27/22 1500   Appointment Info   Signing Clinician's Name / Credentials (OT) Grace Salamanca, OTR/L   Living Environment   People in Home spouse   Current Living Arrangements house   Home Accessibility stairs to enter home;stairs within home   Number of Stairs, Main Entrance 2   Stair Railings, Main Entrance none   Number of Stairs, Within Home, Primary eight   Stair Railings, Within Home, Primary railings safe and in good condition   Transportation Anticipated car, drives self;family or friend will provide   Living Environment Comments lives with spouse. spouse out of house 3 days/week for 10 hours/day. pt would be able to stay on lower level where there is bathroom with walk in shower, bedroom, recliner, but would need to do 8 stairs down to access that level   Self-Care   Usual Activity Tolerance moderate   Current Activity Tolerance fair   Equipment Currently Used at Home cane, straight;walker, rolling  (4WW)   Fall history within last six months yes   Number of times patient has fallen within last six months 2   Activity/Exercise/Self-Care Comment just got a 4WW. uses cane in home sometimes and 4WW in community. typically Indp with dressing, bathing, toileting. reports baseline uses largely PJ pants for LB dressing and LHSH for shoes   Instrumental Activities of Daily Living (IADL)   IADL Comments yunior. wife able to assist at home   General Information   Onset of Illness/Injury or Date of Surgery 12/22/22   Referring Physician Jerald Reese MD   Patient/Family Therapy Goal Statement (OT) pt wants to discharge to home   Additional Occupational Profile Info/Pertinent History of Current Problem Steve Morgan is a 62 year old male patient with past medical history of diabetes mellitus type 2, obstructive sleep apnea, diabetic neuropathy, morbid obesity, hypertension, hyperlipidemia, hypothyroidism, depression, who came to emergency room for evaluation for progressive cough, right hand wound and  swelling. Patient stated that he has been having progressive cough since October. His cough got worse since Tuesday. He also stated that he had chronic wound of both hands due to neuropathy of his hands for which he follows with wound care nurse.  He stated that he developed swelling of right 3rd finger since 12/20/2022.  He developed erythema of right third finger day of admission.  He came to emergency room for evaluation.   Existing Precautions/Restrictions fall   Right Lower Extremity (Weight-bearing Status) non weight-bearing (NWB)   General Observations and Info ok to WB through R elbow. per pt MD said nothing heavier than the cell phone   Cognitive Status Examination   Orientation Status orientation to person, place and time   Cognitive Status Comments pt reports that he does not have good memory   Visual Perception   Visual Impairment/Limitations WFL   Sensory   Sensory Comments pt reports poor sensation baseline in bilteral hands and feet   Pain Assessment   Patient Currently in Pain Yes, see Vital Sign flowsheet   Posture   Posture forward head position;protracted shoulders   Range of Motion Comprehensive   Comment, General Range of Motion WFL for ADL   Strength Comprehensive (MMT)   Comment, General Manual Muscle Testing (MMT) Assessment WFL for ADL. NWB R hand   Coordination   Upper Extremity Coordination Left UE impaired;Right UE impaired   Gross Motor Coordination impaired   Fine Motor Coordination impaired   Coordination Comments baseline deficits in  R and L hands   Bed Mobility   Comment (Bed Mobility) NT at eval   Transfers   Transfers sit-stand transfer   Sit-Stand Transfer   Sit-Stand Buckner (Transfers) contact guard   Assistive Device (Sit-Stand Transfers) walker, platform   Activities of Daily Living   BADL Assessment/Intervention upper body dressing;lower body dressing;toileting;bathing;grooming   Bathing Assessment/Intervention   Buckner Level (Bathing) maximum assist (25% patient  effort)   Upper Body Dressing Assessment/Training   [place] Level (Upper Body Dressing) minimum assist (75% patient effort)   Lower Body Dressing Assessment/Training   [place] Level (Lower Body Dressing) minimum assist (75% patient effort)   Grooming Assessment/Training   [place] Level (Grooming) minimum assist (75% patient effort)   Toileting   [place] Level (Toileting) minimum assist (75% patient effort)   Clinical Impression   Criteria for Skilled Therapeutic Interventions Met (OT) Yes, treatment indicated   OT Diagnosis decreased function in ADL   OT Problem List-Impairments impacting ADL problems related to;activity tolerance impaired;balance;coordination;mobility;sensation   Assessment of Occupational Performance 3-5 Performance Deficits   Identified Performance Deficits bathing, dressing, toileting, home mgmt, transfers   Planned Therapy Interventions (OT) ADL retraining;IADL retraining;progressive activity/exercise   Clinical Decision Making Complexity (OT) low complexity   Anticipated Equipment Needs Upon Discharge (OT) shower chair;commode chair   Risk & Benefits of therapy have been explained evaluation/treatment results reviewed;care plan/treatment goals reviewed;risks/benefits reviewed;current/potential barriers reviewed;participants voiced agreement with care plan;participants included;patient;spouse/significant other   Clinical Impression Comments decreased function in ADL warrants skilled IP OT tx   OT Total Evaluation Time   OT Eval, Low Complexity Minutes (14167) 10   OT Goals   Therapy Frequency (OT) Daily   OT Predicted Duration/Target Date for Goal Attainment 01/03/23   OT Goals Hygiene/Grooming;Upper Body Dressing;Lower Body Dressing;Toilet Transfer/Toileting;Transfers   OT: Hygiene/Grooming supervision/stand-by assist   OT: Upper Body Dressing Supervision/stand-by assist   OT: Lower Body Dressing Supervision/stand-by assist   OT: Transfer Supervision/stand-by assist   OT:  Toilet Transfer/Toileting Supervision/stand-by assist   Interventions   Interventions Quick Adds Self-Care/Home Management   Self-Care/Home Management   Self-Care/Home Mgmt/ADL, Compensatory, Meal Prep Minutes (50242) 40   Symptoms Noted During/After Treatment (Meal Preparation/Planning Training) fatigue   Treatment Detail/Skilled Intervention pt seen for OT tx session this date. agreeable to session. very eager and wants to discharge to home, motivate with therapy. wife present in room. completed sit>Stand from chair with CGA, platform walker. amb in hallway for activity tolerance training, min A on turns for balance, pt reports not stable with platform walker. educated on need and use of platform with NWB. completed toilet tx with CGA, cues for sequencing with platform. standing at sink with platform Becky for fine motor manipulation with use of R for stabalizer, VC for set up and technique. educated on walk in shower transfer technique wtih shower chair with walker. min A to complete. extensive education on toileting qasim cares technique with use of L hand. educated on shower chair rec and commode rec to wife, verbalized understanding and where to obtain. extended time coordinating discharge with CC and pt family. end of session all needs in reach in chair   OT Discharge Planning   OT Plan toilet with L hand for qasim cares. shower transfer wtih sit on chair and pivot technique with step in shower.   OT Discharge Recommendation (DC Rec) Transitional Care Facility   OT Rationale for DC Rec currently well below baseline function in self care and ADL and now NWB on hand, platform walker use. unsteady and has baseline coordiantion/sensation deficits that are exacerbated at this time. recommend TCU . if home need commode, shower chair and A x1 for all ADL   OT Brief overview of current status Ax1, NWB but platform walker ok   Total Session Time   Timed Code Treatment Minutes 40   Total Session Time (sum of timed and  untimed services) 50

## 2022-12-27 NOTE — PLAN OF CARE
Goal Outcome Evaluation:    [facility] cares [phone]. Pt A&Ox4. Denies pain in hand incision. C/o headache and improved after prn Tylenol. Denies SOB. BP elevated 150s/70s. Rest of vitals stable. R hand wrapped in Kerlix and Ace wrap. CMS in R hand with no changes. Continues with neuropathy in all extremities, saying he doesn't have sensation in hands and feet. Patient having tremors, and gait unsteady, but saying this is a baseline. Assist of 1 with gaitbelt and walker.    Per MD order Sodium chloride 2% infusion was adjusted to 30ml, and then back to 15ml/hr. Na 124, 127, 127.

## 2022-12-27 NOTE — PROGRESS NOTES
[facility]    Infectious Disease Progress Note    Date of Service : 12/27/2022     Assessment:  62-year-old male with chronic right third finger distal ulcer who has been hospitalized with worsening and evidence of osteomyelitis on imaging and is s/p i7D wit partial amputation. Cxs are growing s.aurues sensitivity pending    -Right third finger chronic ulcer with osteomyelitis and concern for septic tenosynovitis  -CKD  -Hyponatremia  -History of chronic and recurrent hand and foot infections and wounds, followed in the wound care center.    -Neuropathy  -History of methicillin-resistant Staphylococcus aureus, including from fingers.  -Diabetes mellitus.  -Ongoing cough for the last 2 months.  Given antibiotics recently, but nothing for bacterial pneumonia and unlikely to be the case, probably post-COVID type cough, some fluid present so possibly cardiac      Recommendations:  1.  Op noted, cultures with staph and strep, on Ancef now and tolerating well  2 by initial appearance and even current appearance still somewhat concerned about proximal tenosynovitis, had I&D partially into the tendon sheath and looked infected, I favor at least a couple of weeks of IV antibiotics if were not going to do nothing more surgically or image any further and see how he does if not improving then consider reimaging.  If he improves possibly oral longer at that point, presumptively all osteomyelitis has been resected.   Place midline and check on home IV options    Hebert Preciado MD    Interval History   Patient was seen and examined, chart reviewed  S/p right long finger irrigation and debridement with partial amputation of the right long finger. Cxs are growing s.aureus, sensitivity is MSSA . Remained afebrile and is tolerating antibiotics without side effects    Physical Exam   Temp: 98.3  F (36.8  C) Temp src: Oral BP: (!) 145/58 Pulse: 60   Resp: 18 SpO2: 93 % O2 Device: None (Room air)    Vitals:     12/22/22 1628 12/23/22 0036   Weight: 113.4 kg (250 lb) 110.9 kg (244 lb 6.4 oz)     Vital Signs with Ranges  Temp:  [98.3  F (36.8  C)-99  F (37.2  C)] 98.3  F (36.8  C)  Pulse:  [56-60] 60  Resp:  [18] 18  BP: (145-162)/(58-78) 145/58  SpO2:  [92 %-96 %] 93 %    Constitutional: Awake, alert, cooperative, no apparent distress  Lungs: Clear to auscultation bilaterally, no crackles or wheezing  Cardiovascular: Regular rate and rhythm, normal S1 and S2, and no murmur noted  Abdomen: Normal bowel sounds, soft, non-distended, non-tender  Skin: No rash  MS :R hand long finer very swollen, red .     Other:    Medications       atenolol  25 mg Oral Daily     buPROPion  150 mg Oral QPM     calcium carbonate-vitamin D  1 tablet Oral Daily     ceFAZolin  2 g Intravenous Q8H     citalopram  40 mg Oral Daily     cyanocobalamin  250 mcg Oral Daily     famotidine  40 mg Oral Daily     ferrous sulfate  325 mg Oral Daily with breakfast     finasteride  5 mg Oral Daily     glipiZIDE  20 mg Oral Daily     insulin aspart  1-10 Units Subcutaneous TID AC     insulin aspart  1-7 Units Subcutaneous At Bedtime     insulin glargine  15 Units Subcutaneous Once     insulin glargine  30 Units Subcutaneous At Bedtime     latanoprost  1 drop Both Eyes At Bedtime     levothyroxine  137 mcg Oral Daily     losartan  100 mg Oral Daily     ondansetron  4 mg Oral At Bedtime     polyethylene glycol  17 g Oral Daily     ropivacaine 300 mg, EPINEPHrine 0.6 mg in saline 100 mL - customizable   INTRA-ARTICULAR On Call to OR     senna-docusate  1 tablet Oral BID     simvastatin  20 mg Oral At Bedtime     sodium chloride (PF)  3 mL Intracatheter Q8H     sodium chloride  1 g Oral BID w/meals     Vitamin D3  1,000 Units Oral Daily       Data   All microbiology laboratory data reviewed.  Recent Labs   Lab Test 12/26/22  0716 12/25/22  0825 12/24/22  0713 12/23/22  0805   WBC 9.6  --  9.6 11.9*   HGB 8.5* 9.3* 8.0* 8.5*   HCT 25.9*  --  24.9* 26.2*   MCV 87  --   88 88     --  308 270     Recent Labs   Lab Test 12/26/22  0716 12/24/22  0713 12/23/22  0805   CR 1.31* 1.21* 1.36*     Component      Latest Ref Rng & Units 12/24/2022   Vancomycin      ug/mL 7.0     Microbiology  12/23 R finger tissue  Finger, Right; Tissue    0 Result Notes  Culture Culture in progress       1+ Staphylococcus aureus

## 2022-12-27 NOTE — PROCEDURES
[facility]    Single Lumen Midline Placement    Date/Time: 12/27/2022 4:30 PM  Performed by: Alejandra Torres RN  Authorized by: Hebert Preciado MD   Indications: vascular access      UNIVERSAL PROTOCOL   Site Marked: Yes  Prior Images Obtained and Reviewed:  Yes  Required items: Required blood products, implants, devices and special equipment available    Patient identity confirmed:  Verbally with patient, arm band, provided demographic data and hospital-assigned identification number  NA - No sedation, light sedation, or local anesthesia  Confirmation Checklist:  Patient's identity using two indicators, relevant allergies, procedure was appropriate and matched the consent or emergent situation and correct equipment/implants were available  Time out: Immediately prior to the procedure a time out was called    Universal Protocol: the Joint Commission Universal Protocol was followed    Preparation: Patient was prepped and draped in usual sterile fashion    ESBL (mL):  1     ANESTHESIA    Anesthesia: Local infiltration  Local Anesthetic:  Lidocaine 1% without epinephrine  Anesthetic Total (mL):  1.5      SEDATION    Patient Sedated: No        Preparation: skin prepped with ChloraPrep  Skin prep agent: skin prep agent completely dried prior to procedure  Sterile barriers: maximum sterile barriers were used: cap, mask, sterile gown, sterile gloves, and large sterile sheet  Hand hygiene: hand hygiene performed prior to central venous catheter insertion  Type of line used: Midline  Catheter type: single lumen  Lumen type: non-valved  Catheter size: 4 Fr  Brand: Bard  Lot number: [id number]  Placement method: venipuncture, MST and ultrasound  Number of attempts: 1  Difficulty threading catheter: no  Successful placement: yes  Orientation: left  Catheter to Vein (%): 6 (5.2 mm)  Location: cephalic vein  Tip Location: distal to axillary vein  Arm circumference: adults 10 cm  Extremity circumference:  30  Visible catheter length: 2  Internal length: 13 cm  Total catheter length: 15  Dressing and securement: adhesive securement device, additional securement method (see comment), alcohol impregnated caps, blood cleaned with CHG, chlorhexidine disc applied, line secured, securement device, site cleansed, sterile dressing applied, subcutaneous anchor securement system and transparent dressing  Post procedure assessment: blood return through all ports and free fluid flow  PROCEDURE   Patient Tolerance:  Patient tolerated the procedure well with no immediate complicationsDescribe Procedure: Left cephalic vein midline placed without difficulty. Lumen has good blood return and flushes easily. Midline tip is distal to axilla.  MIDLINE IS OK TO USE. Charge RN updated and will update bedside RN Fiona. See flowsheet for additional information.

## 2022-12-27 NOTE — PROGRESS NOTES
Orthopedic Surgery  Steve Morgan  12/27/2022     Admit Date:  12/22/2022  POD: 4 Days Post-Op   Procedure(s):  Right long finger irrigation and debridement with partial amputation of the right long finger.     Alert and oriented.   Patient resting comfortably in bed.    Denies increase in pain in finger (has neuropathy)     Temp:  [98.3  F (36.8  C)-99  F (37.2  C)] 98.3  F (36.8  C)  Pulse:  [56-60] 60  Resp:  [18] 18  BP: (145-162)/(58-78) 145/58  SpO2:  [92 %-96 %] 93 %    Alert and oriented.  Right hand dressing is changed at bedside.     No drainage from incision.   No expanding erythema in these areas. Erythema decreased today vs images in chart from Sunday.   Forearm nontender.   Radial pulse palpable.   Digits are warm and well-perfused.  Sensation intact to light touch    Labs:  Recent Labs   Lab Test 12/26/22  0716 12/25/22  0825 12/24/22  0713 12/23/22  0805   WBC 9.6  --  9.6 11.9*   HGB 8.5* 9.3* 8.0* 8.5*     --  308 270     No lab results found.  Recent Labs   Lab Test 12/27/22  0637 12/26/22  0716 12/25/22  0825   CRP 44.28* 61.53* 88.76*       1. PLAN:    Reviewed pictures from Sunday in chart, erythema improving.  CRP trending down, WBC stable and patient is afebrile.  Images were sent to Dr English today.  He feels the finger is gradually improving and healing as expected.  Will defer to ID to order MRI if this is required for antibiotic coverage decision making.      No DVT prophylaxis from hand surgery standpoint.    Abx per ID    Non-WBing right hand. Okay to use platform walker and weight bear through forearm.    Continue current pain regiment.    Daily dressing changes and PRN dressing changes by nurse if saturation occurs. Please contact orthopedics if any questions or concerns      Follow-up: 2 weeks post-op with Dr. Colby English       2. Disposition   Anticipate d/c to home when medically cleared and Abx plan est.     Nilam Robin PA-C

## 2022-12-27 NOTE — PROGRESS NOTES
[facility]  Hospitalist Progress Note  Jerald Reese MD.     12/27/2022    Reason for Stay/active problem list    Severe hand infection with Osteomyelitis of right third finger    Hyponatremia     [place]          Assessment and Plan:        Summary of Stay:   Steve Morgan is a 62 year old male patient with past medical history of diabetes mellitus type 2, obstructive sleep apnea, diabetic neuropathy, morbid obesity, hypertension, hyperlipidemia, hypothyroidism, depression, who came to emergency room for evaluation for progressive cough, right hand wound and swelling. Patient stated that he has been having progressive cough since October. His cough got worse since Tuesday. He also stated that he had chronic wound of both hands due to neuropathy of his hands for which he follows with wound care nurse.  He stated that he developed swelling of right 3rd finger since 12/20/2022.  He developed erythema of right third finger day of admission.  He came to emergency room for evaluation.    On arrival to emergency room, his vital signs were checked and showed temperature 98.8, pulse 103, blood pressure 174/92, oxygen saturation 97% on room air.    Laboratory work-up showed sodium 118, potassium 4.7, creatinine 1.51, lactic acid 0.9, WBC 15.6, hemoglobin 9.1.    Chest x-ray showed streaky opacities in both lungs suspicious for atelectasis versus developing pneumonia.  X-ray of the right hand was done and showed marked soft tissue swelling throughout the right third finger, severe flexion deformity and suspicious for osteomyelitis.    In emergency room, he was given IV fluid.  He was also started on IV Zosyn and Vancomycin.  He was admitted to the hospital for further management.      Problem List with Assessment and Plan:  Osteomyelitis of right third finger  History of diabetic neuropathy with hand deformities  Chronic hand wound  -Patient has multiple wounds on both hands with deformity of bilateral fingers.   He has swelling and erythema of right third finger.  X-ray of the right hand concerning for osteomyelitis.  -He was started on IV Zosyn and vancomycin in the ER, then changed to Ceftriaxone and Vancomycin, then Rocephin was stopped and continued on vancomycin alone.   -ID consulted input appreciated, culture from the site came back positive for MSSA, antibiotic changed to Ancef 12/26.  -Orthopedic surgery was consulted and did Irrigation and debridement with a partial amputation right long finger on 12/23/22.  -Currently he is afebrile, he is right hand wound is dressed.    -Wound care and assessment per orthopedic service.  -Noted ID recommendation for MRI of the hand with/without, discussed with orthopedic service, and will be ordered by them and will be done      Cough, resolving no sign of pneumonia.  -He does have intermittent cough which worsened in last few days   -Treated with Augmentin as an outpatient, continue to have intermittent cough.  -Chest x-ray showed streaky opacities in both lungs which could represent likely atelectasis than pneumonia.    Hyponatremia:  -Sodium level was 118 on arrival, improved-today is 129.  -History of low sodium, and stated he drinks excess water .  -His baseline sodium level is 127.  -His Na level trended down, to 124 on 12/26 after IVF was stopped.  -Started him on 2% saline at  15 ml/hr overnight. Na improved to 127.  -Stopped the 2% saline this morning.  -Continue fluid restriction to 1500 ml/day.  -Started on NaCl tablets BID.  -Check Na level very 8 hours.  -Call MD with results    [place] on CKD.  -Baseline creatinine around 1.01-1.08.  Creatinine 1.51 on admission, trended down to 1.31 today.  -It was .21 on December 24, continue to monitor kidney function.  -Avoid nephrotoxic medications.     DM:  -Continue basal insulin, sliding scale insulin.  -Metformin on hold, glipizide 20 mg was restarted.  -Glucose is fairly controlled, latest level is 93.  -On Lantus insulin 30  "units subcu daily.   -Blood glucose has been variable. It was 83 this morning, and 252 at lunch time.  -Continue Sliding scale insulin, oral glipizide.    -Continue to hold Metformin and monitor blood sugar closely and titrate insulin as needed    Hypertension:  -Resumed PTA medication, Atenolol.    -Losartan on hold due to acute kidney injury      VTE Prophylaxis: Pneumatic Compression Devices  Code Status: Full Code  Diet: Advance Diet as Tolerated: Regular Diet Adult  Fluid restriction 1500 ML FLUID    [place] Catheter: Not present       Disposition: Expect at least 2 more days in the hospital for IV antibiotic, may need further imaging study with MRI per ID recommendation. I discussed with Ortho and will order the MRI., will discuss with orthopedic service as to the timing. Orthopedic surgery and infectious disease service following.      I discussed with patient the plan of care, all his questions and concerns addressed  I discussed with Ortho, and plan for repeat MRI today.        Interval History (Subjective):      Patient seen and examined, no new overnight issues, feels better, no fever or chills.  Pain is fairly controlled, wound is redressed.              Physical Exam:        Last Vital Signs:  BP (!) 145/58   Pulse 60   Temp 98.3  F (36.8  C) (Oral)   Resp 18   Ht 1.651 m (5' 5\")   Wt 110.9 kg (244 lb 6.4 oz)   SpO2 93%   BMI 40.67 kg/m      I/O last 3 completed shifts:  In: 450 [P.O.:450]  Out: 600 [Urine:600]    Wt Readings from Last 5 Encounters:   12/23/22 110.9 kg (244 lb 6.4 oz)   06/15/22 111.1 kg (245 lb)   11/16/21 111.1 kg (245 lb)   09/24/21 109.3 kg (241 lb)   08/09/21 112 kg (247 lb)      Constitutional: Awake, alert, cooperative, no apparent distress     Respiratory: Clear to auscultation bilaterally, no crackles or wheezing   Cardiovascular: Regular rate and rhythm, normal S1 and S2, and no murmur noted   Abdomen: Normal bowel sounds, soft, non-distended, non-tender   Skin: No new " rashes, no cyanosis, dry to touch   Neuro: Alert with  no new focal weakness   Extremities: Dressed right hand, noted significant deformities of the digits and wrist joints,   Other(s):        All other systems: Negative.          Medications:      All current medications were reviewed with changes reflected in problem list.  Current Facility-Administered Medications   Medication     acetaminophen (TYLENOL) tablet 650 mg     atenolol (TENORMIN) tablet 25 mg     benzocaine-menthol (CHLORASEPTIC) 6-10 MG lozenge 1 lozenge     bisacodyl (DULCOLAX) suppository 10 mg     buPROPion (WELLBUTRIN XL) 24 hr tablet 150 mg     calcium carbonate-vitamin D (OSCAL) 500-5 MG-MCG per tablet 1 tablet     ceFAZolin (ANCEF) 2 g in 100 mL D5W intermittent infusion     citalopram (celeXA) tablet 40 mg     cyanocobalamin (VITAMIN B-12) half-tab 250 mcg     glucose gel 15-30 g    Or     dextrose 50 % injection 25-50 mL    Or     glucagon injection 1 mg     famotidine (PEPCID) tablet 40 mg     ferrous sulfate (FEROSUL) tablet 325 mg     finasteride (PROSCAR) tablet 5 mg     fluticasone (FLONASE) 50 MCG/ACT spray 1 spray     glipiZIDE (GLUCOTROL XL) 24 hr tablet 20 mg     HYDROmorphone (DILAUDID) injection 0.2 mg    Or     HYDROmorphone (DILAUDID) injection 0.4 mg     insulin aspart (NovoLOG) injection (RAPID ACTING)     insulin aspart (NovoLOG) injection (RAPID ACTING)     insulin glargine (LANTUS PEN) injection 15 Units     insulin glargine (LANTUS PEN) injection 30 Units     latanoprost (XALATAN) 0.005 % ophthalmic solution 1 drop     levothyroxine (SYNTHROID/LEVOTHROID) tablet 137 mcg     lidocaine (LMX4) cream     lidocaine 1 % 0.1-1 mL     losartan (COZAAR) tablet 100 mg     magnesium hydroxide (MILK OF MAGNESIA) suspension 30 mL     melatonin tablet 1 mg     naloxone (NARCAN) injection 0.2 mg    Or     naloxone (NARCAN) injection 0.4 mg    Or     naloxone (NARCAN) injection 0.2 mg    Or     naloxone (NARCAN) injection 0.4 mg      ondansetron (ZOFRAN ODT) ODT tab 4 mg     ondansetron (ZOFRAN ODT) ODT tab 4 mg    Or     ondansetron (ZOFRAN) injection 4 mg     oxyCODONE (ROXICODONE) tablet 5 mg    Or     oxyCODONE (ROXICODONE) tablet 10 mg     pantoprazole (PROTONIX) EC tablet 40 mg     polyethylene glycol (MIRALAX) Packet 17 g     prochlorperazine (COMPAZINE) injection 10 mg    Or     prochlorperazine (COMPAZINE) tablet 10 mg     ropivacaine (NAROPIN) 300 mg, EPINEPHrine (ADRENALIN) 0.6 mg in sodium chloride 0.9 % 100 mL (ORTHO EDNA CUSTOM DOSE)     senna-docusate (SENOKOT-S/PERICOLACE) 8.6-50 MG per tablet 1 tablet     senna-docusate (SENOKOT-S/PERICOLACE) 8.6-50 MG per tablet 1 tablet    Or     senna-docusate (SENOKOT-S/PERICOLACE) 8.6-50 MG per tablet 2 tablet     simvastatin (ZOCOR) tablet 20 mg     sodium chloride (PF) 0.9% PF flush 3 mL     sodium chloride (PF) 0.9% PF flush 3 mL     sodium chloride tablet 1 g     Vitamin D3 (CHOLECALCIFEROL) tablet 1,000 Units          Data:      All new lab and imaging data was reviewed.    Data reviewed today: I reviewed all new labs and imaging results over the last 24 hours. I personally reviewed   Recent Labs   Lab 12/26/22  0716 12/25/22  0825 12/24/22  0713 12/23/22  0805   WBC 9.6  --  9.6 11.9*   HGB 8.5* 9.3* 8.0* 8.5*   HCT 25.9*  --  24.9* 26.2*   MCV 87  --  88 88     --  308 270     No results for input(s): CULT in the last 168 hours.  Recent Labs   Lab 12/27/22  1151 12/27/22  0749 12/27/22  0637 12/27/22  0205 12/27/22  0157 12/26/22  2148 12/26/22  0755 12/26/22  0716 12/24/22  0724 12/24/22  0713 12/23/22  0906 12/23/22  0805 12/23/22  0128 12/22/22  1827   NA  --   --  127*  --  127* 124*   < > 129*  130*  130*   < > 128*  128*   < > 127*  127*   < > 118*   POTASSIUM  --   --   --   --   --   --   --  4.7  --  4.1  --  4.1  --  4.7   CHLORIDE  --   --   --   --   --   --   --  96*  --  94*  --  93*  --  82*   CO2  --   --   --   --   --   --   --  23  --  21*  --  20*  --   22   ANIONGAP  --   --   --   --   --   --   --  11  --  13  --  14  --  14   * 84  --  133*  --   --    < > 87   < > 149*   < > 168*   < > 253*   BUN  --   --   --   --   --   --   --  14.0  --  12.2  --  15.0  --  16.7   CR  --   --   --   --   --   --   --  1.31*  --  1.21*  --  1.36*  --  1.51*   GFRESTIMATED  --   --   --   --   --   --   --  61  --  67  --  58*  --  51*   CHAR  --   --   --   --   --   --   --  9.3  --  8.6*  --  8.0*  --  8.3*   PROTTOTAL  --   --   --   --   --   --   --   --   --   --   --   --   --  7.7   ALBUMIN  --   --   --   --   --   --   --   --   --   --   --   --   --  3.8   BILITOTAL  --   --   --   --   --   --   --   --   --   --   --   --   --  0.8   ALKPHOS  --   --   --   --   --   --   --   --   --   --   --   --   --  115   AST  --   --   --   --   --   --   --   --   --   --   --   --   --  36   ALT  --   --   --   --   --   --   --   --   --   --   --   --   --  38    < > = values in this interval not displayed.       Recent Labs   Lab 12/27/22  1151 12/27/22  0749 12/27/22  0205 12/26/22  2112 12/26/22  1706   * 84 133* 207* 207*     No results for input(s): INR in the last 168 hours.    No results for input(s): TROPONIN, TROPI, TROPR in the last 168 hours.    Invalid input(s): TROP, TROPONINIES    Recent Results (from the past 48 hour(s))   Chest XR,  PA & LAT    Narrative    EXAM: XR CHEST 2 VIEWS  LOCATION: [facility]  DATE/TIME: 12/22/2022 4:55 PM    INDICATION: sob  COMPARISON: None.      Impression    IMPRESSION: Heart size is normal. Streaky opacities are present in both lung bases, atelectasis versus developing pneumonia. Follow-up advised.. Upper lobes are clear. No pneumothorax. No effusions. No pneumothorax.   XR Finger Right G/E 2 Views    Narrative    EXAM: XR FINGER RIGHT G/E 2 VIEWS  LOCATION: [facility]  DATE/TIME: 12/22/2022 7:21 PM    INDICATION: Right third finger pain and  swelling.  COMPARISON: None.      Impression    IMPRESSION:     Marked soft tissue swelling throughout the right third finger. Severe flexion deformity and dislocation at the DIP joint, compatible with disruption of the common extensor tendon.     Erosion of the tuft of the distal phalanx is highly suspicious for osteomyelitis.     There is skin irregularity/ulceration in the tip of the finger, and there are a few small ossific fragments, which may be erosions related to osteomyelitis, or could represent an avulsion fracture related to prior tendon disruption. No erosive change,   cortical defect, or focal lucency within the middle or proximal phalanges. The PIP and MCP joints are normally aligned.     Marked soft tissue swelling is also present throughout the dorsum of the hand. No other fracture or erosion. No other joint malalignment.       COVID Status:  COVID-19 PCR Results    COVID-19 PCR Results 12/22/22   SARS CoV2 PCR Negative      Comments are available for some flowsheets but are not being displayed.         COVID-19 Antibody Results, Testing for Immunity    COVID-19 Antibody Results, Testing for Immunity   No data to display.

## 2022-12-28 ENCOUNTER — APPOINTMENT (OUTPATIENT)
Dept: OCCUPATIONAL THERAPY | Facility: CLINIC | Age: 62
DRG: 854 | End: 2022-12-28
Payer: MEDICARE

## 2022-12-28 ENCOUNTER — APPOINTMENT (OUTPATIENT)
Dept: PHYSICAL THERAPY | Facility: CLINIC | Age: 62
DRG: 854 | End: 2022-12-28
Payer: MEDICARE

## 2022-12-28 LAB
BACTERIA BLD CULT: NO GROWTH
CRP SERPL-MCNC: 26.99 MG/L
GLUCOSE BLDC GLUCOMTR-MCNC: 147 MG/DL (ref 70–99)
GLUCOSE BLDC GLUCOMTR-MCNC: 172 MG/DL (ref 70–99)
GLUCOSE BLDC GLUCOMTR-MCNC: 185 MG/DL (ref 70–99)
GLUCOSE BLDC GLUCOMTR-MCNC: 229 MG/DL (ref 70–99)
GLUCOSE BLDC GLUCOMTR-MCNC: 79 MG/DL (ref 70–99)
SODIUM SERPL-SCNC: 126 MMOL/L (ref 136–145)
SODIUM SERPL-SCNC: 130 MMOL/L (ref 136–145)

## 2022-12-28 PROCEDURE — 86140 C-REACTIVE PROTEIN: CPT | Performed by: PHYSICIAN ASSISTANT

## 2022-12-28 PROCEDURE — 99232 SBSQ HOSP IP/OBS MODERATE 35: CPT | Performed by: INTERNAL MEDICINE

## 2022-12-28 PROCEDURE — 97535 SELF CARE MNGMENT TRAINING: CPT | Mod: GO

## 2022-12-28 PROCEDURE — 250N000013 HC RX MED GY IP 250 OP 250 PS 637: Performed by: INTERNAL MEDICINE

## 2022-12-28 PROCEDURE — 120N000001 HC R&B MED SURG/OB

## 2022-12-28 PROCEDURE — 250N000011 HC RX IP 250 OP 636: Performed by: SPECIALIST

## 2022-12-28 PROCEDURE — 97116 GAIT TRAINING THERAPY: CPT | Mod: GP | Performed by: PHYSICAL THERAPIST

## 2022-12-28 PROCEDURE — 250N000011 HC RX IP 250 OP 636: Performed by: INTERNAL MEDICINE

## 2022-12-28 PROCEDURE — 84295 ASSAY OF SERUM SODIUM: CPT | Performed by: INTERNAL MEDICINE

## 2022-12-28 PROCEDURE — 97530 THERAPEUTIC ACTIVITIES: CPT | Mod: GP | Performed by: PHYSICAL THERAPIST

## 2022-12-28 RX ORDER — CEFAZOLIN SODIUM 1 G/3ML
2 INJECTION, POWDER, FOR SOLUTION INTRAMUSCULAR; INTRAVENOUS EVERY 8 HOURS
Qty: 1 EACH | Refills: 1 | Status: SHIPPED | OUTPATIENT
Start: 2022-12-28 | End: 2023-01-12

## 2022-12-28 RX ADMIN — LOSARTAN POTASSIUM 100 MG: 100 TABLET, FILM COATED ORAL at 08:06

## 2022-12-28 RX ADMIN — ONDANSETRON 4 MG: 4 TABLET, ORALLY DISINTEGRATING ORAL at 21:04

## 2022-12-28 RX ADMIN — FINASTERIDE 5 MG: 5 TABLET, FILM COATED ORAL at 08:07

## 2022-12-28 RX ADMIN — LATANOPROST 1 DROP: 50 SOLUTION/ DROPS OPHTHALMIC at 21:06

## 2022-12-28 RX ADMIN — CEFAZOLIN SODIUM 2 G: 2 INJECTION, SOLUTION INTRAVENOUS at 04:04

## 2022-12-28 RX ADMIN — SODIUM CHLORIDE 1 G: 1 TABLET ORAL at 18:45

## 2022-12-28 RX ADMIN — BUPROPION HYDROCHLORIDE 150 MG: 150 TABLET, EXTENDED RELEASE ORAL at 21:04

## 2022-12-28 RX ADMIN — LEVOTHYROXINE SODIUM 137 MCG: 0.11 TABLET ORAL at 08:06

## 2022-12-28 RX ADMIN — CYANOCOBALAMIN TAB 500 MCG 250 MCG: 500 TAB at 08:06

## 2022-12-28 RX ADMIN — SODIUM CHLORIDE 1 G: 1 TABLET ORAL at 08:06

## 2022-12-28 RX ADMIN — CITALOPRAM HYDROBROMIDE 40 MG: 20 TABLET ORAL at 08:06

## 2022-12-28 RX ADMIN — Medication 1000 UNITS: at 08:07

## 2022-12-28 RX ADMIN — FERROUS SULFATE TAB 325 MG (65 MG ELEMENTAL FE) 325 MG: 325 (65 FE) TAB at 08:07

## 2022-12-28 RX ADMIN — Medication 1 TABLET: at 08:07

## 2022-12-28 RX ADMIN — SIMVASTATIN 20 MG: 20 TABLET, FILM COATED ORAL at 21:04

## 2022-12-28 RX ADMIN — CEFAZOLIN SODIUM 2 G: 2 INJECTION, SOLUTION INTRAVENOUS at 21:04

## 2022-12-28 RX ADMIN — FAMOTIDINE 40 MG: 20 TABLET, FILM COATED ORAL at 08:06

## 2022-12-28 RX ADMIN — ATENOLOL 25 MG: 25 TABLET ORAL at 08:07

## 2022-12-28 RX ADMIN — GLIPIZIDE 20 MG: 10 TABLET, EXTENDED RELEASE ORAL at 08:05

## 2022-12-28 RX ADMIN — CEFAZOLIN SODIUM 2 G: 2 INJECTION, SOLUTION INTRAVENOUS at 11:44

## 2022-12-28 ASSESSMENT — ACTIVITIES OF DAILY LIVING (ADL)
ADLS_ACUITY_SCORE: 24
ADLS_ACUITY_SCORE: 24
ADLS_ACUITY_SCORE: 26
ADLS_ACUITY_SCORE: 25
ADLS_ACUITY_SCORE: 24
ADLS_ACUITY_SCORE: 24
ADLS_ACUITY_SCORE: 26
ADLS_ACUITY_SCORE: 24
ADLS_ACUITY_SCORE: 25
ADLS_ACUITY_SCORE: 25
ADLS_ACUITY_SCORE: 26
ADLS_ACUITY_SCORE: 24

## 2022-12-28 NOTE — PROGRESS NOTES
[facility]  Hospitalist Progress Note  Jerald Reese MD.     12/28/2022    Reason for Stay/active problem list    Severe hand infection with Osteomyelitis of right third finger    Hyponatremia     [place]          Assessment and Plan:        Summary of Stay:   Steve Morgan is a 62 year old male patient with past medical history of diabetes mellitus type 2, obstructive sleep apnea, diabetic neuropathy, morbid obesity, hypertension, hyperlipidemia, hypothyroidism, depression, who came to emergency room for evaluation for progressive cough, right hand wound and swelling. Patient stated that he has been having progressive cough since October. His cough got worse since Tuesday. He also stated that he had chronic wound of both hands due to neuropathy of his hands for which he follows with wound care nurse.  He stated that he developed swelling of right 3rd finger since 12/20/2022.  He developed erythema of right third finger day of admission.  He came to emergency room for evaluation.    On arrival to emergency room, his vital signs were checked and showed temperature 98.8, pulse 103, blood pressure 174/92, oxygen saturation 97% on room air.    Laboratory work-up showed sodium 118, potassium 4.7, creatinine 1.51, lactic acid 0.9, WBC 15.6, hemoglobin 9.1.    Chest x-ray showed streaky opacities in both lungs suspicious for atelectasis versus developing pneumonia.  X-ray of the right hand was done and showed marked soft tissue swelling throughout the right third finger, severe flexion deformity and suspicious for osteomyelitis.    In emergency room, he was given IV fluid.  He was also started on IV Zosyn and Vancomycin.  He was admitted to the hospital for further management.      Problem List with Assessment and Plan:  Osteomyelitis of right third finger.  Possible tenosynovitis  History of diabetic neuropathy with hand deformities  Chronic hand wound  -Patient has multiple wounds on both hands with  deformity of bilateral fingers.  He has swelling and erythema of right third finger.  X-ray of the right hand concerning for osteomyelitis.  -He was started on IV Zosyn and vancomycin in the ER, then changed to Ceftriaxone and Vancomycin, then Rocephin was stopped and continued on vancomycin alone.   -ID consulted input appreciated, culture from the site came back positive for MSSA and GB strept, antibiotic changed to Ancef 12/26.  -Orthopedic surgery was consulted and did Irrigation and debridement with a partial amputation right middle finger on 12/23/22.  -Currently he is afebrile, his right hand wound is dressed.    -Wound care and assessment per orthopedic service.  -Ortho deferred doing MRI, as he has  no fever, no leukocytosis, and CRP is trending downwards. ID planning IV antibiotics for couple of weeks and to consider imaging if he does not improve.  Midline was placed..      Cough, resolving no sign of pneumonia. Improved  -He does have intermittent cough which worsened in last few days   -Treated with Augmentin as an outpatient, continue to have intermittent cough.  -Chest x-ray showed streaky opacities in both lungs which could represent likely atelectasis than pneumonia.    Hyponatremia:  -Sodium level was 118 on arrival, improved-today is 129.  -History of low sodium, and stated he drinks excess water .  -His baseline sodium level is 127.  -His Na level trended down, to 124 on 12/26 after IVF was stopped.  -Started him on 2% saline at  15 ml/hr on 12/26.  -Na improved to 127.  -Stopped the 2% saline 12/27.  -Continue fluid restriction at 1500 ml/day.  -Started on NaCl tablets BID.  -Na improved to 130 today, his baseline is 127 to 130.  -Call MD with results if less than 125.  -Patient and wife stated that he drinks a lot of water every day probably resulting in his hyponatremia.  -His sodium level should be monitored as an outpatient as well.  -Consider fluid restriction to less than 2 L upon  "discharge.      [place] on CKD.  -Baseline creatinine around 1.01-1.08.  Creatinine 1.51 on admission, trended down to 1.31 today.  -It was .21 on December 24, continue to monitor kidney function.  -Avoid nephrotoxic medications.     DM:  -Continue basal insulin, sliding scale insulin.  -Metformin on hold, glipizide 20 mg was restarted.  -Glucose is fairly controlled, latest level is 93.  -On Lantus insulin 30 units subcu daily.   -Blood glucose has been variable. It was 83 this morning, and 252 at lunch time.  -Continue Sliding scale insulin, oral glipizide.    -Continue to hold Metformin and monitor blood sugar closely and titrate insulin as needed    Hypertension:  -Resumed PTA medication, Atenolol.    -Losartan on hold due to acute kidney injury      VTE Prophylaxis: Pneumatic Compression Devices  Code Status: Full Code  Diet: Advance Diet as Tolerated: Regular Diet Adult  Fluid restriction 1500 ML FLUID    [place] Catheter: Not present       Disposition: TCU likely when placement is available in the next 1 to 2 days and ok with ID and Ortho.    I discussed with patient and with his wife the plan of care.  All their question and concerns addressed. He will be discharged to TCU when placement is available for him in the next 1 to 2 days.  Patient will need IV antibiotic upon discharge per ID recommendation.        Interval History (Subjective):      Patient seen and examined, no new overnight issues, feels better, no fever or chills.  Pain is fairly controlled, right middle finger surgical site is clean and healing, overall the partially amputated middle finger is swollen, no discharge.              Physical Exam:        Last Vital Signs:  BP (!) 146/66 (BP Location: Right arm)   Pulse 57   Temp 98.4  F (36.9  C) (Oral)   Resp 18   Ht 1.651 m (5' 5\")   Wt 110.9 kg (244 lb 6.4 oz)   SpO2 95%   BMI 40.67 kg/m      No intake/output data recorded.    Wt Readings from Last 5 Encounters:   12/23/22 110.9 kg (244 lb " 6.4 oz)   06/15/22 111.1 kg (245 lb)   11/16/21 111.1 kg (245 lb)   09/24/21 109.3 kg (241 lb)   08/09/21 112 kg (247 lb)      Constitutional: Awake, alert, cooperative, no apparent distress     Respiratory: Clear to auscultation bilaterally, no crackles or wheezing   Cardiovascular: Regular rate and rhythm, normal S1 and S2, and no murmur noted   Abdomen: Normal bowel sounds, soft, non-distended, non-tender   Skin: No new rashes, no cyanosis, dry to touch   Neuro: Alert with  no new focal weakness   Extremities: Dressed right hand, noted significant deformities of the digits and wrist joints,   Other(s):        All other systems: Negative.          Medications:      All current medications were reviewed with changes reflected in problem list.  Current Facility-Administered Medications   Medication     acetaminophen (TYLENOL) tablet 650 mg     atenolol (TENORMIN) tablet 25 mg     benzocaine-menthol (CHLORASEPTIC) 6-10 MG lozenge 1 lozenge     bisacodyl (DULCOLAX) suppository 10 mg     buPROPion (WELLBUTRIN XL) 24 hr tablet 150 mg     calcium carbonate-vitamin D (OSCAL) 500-5 MG-MCG per tablet 1 tablet     ceFAZolin (ANCEF) 2 g in 100 mL D5W intermittent infusion     citalopram (celeXA) tablet 40 mg     cyanocobalamin (VITAMIN B-12) half-tab 250 mcg     glucose gel 15-30 g    Or     dextrose 50 % injection 25-50 mL    Or     glucagon injection 1 mg     famotidine (PEPCID) tablet 40 mg     ferrous sulfate (FEROSUL) tablet 325 mg     finasteride (PROSCAR) tablet 5 mg     fluticasone (FLONASE) 50 MCG/ACT spray 1 spray     glipiZIDE (GLUCOTROL XL) 24 hr tablet 20 mg     HYDROmorphone (DILAUDID) injection 0.2 mg    Or     HYDROmorphone (DILAUDID) injection 0.4 mg     insulin aspart (NovoLOG) injection (RAPID ACTING)     insulin aspart (NovoLOG) injection (RAPID ACTING)     insulin glargine (LANTUS PEN) injection 15 Units     insulin glargine (LANTUS PEN) injection 30 Units     latanoprost (XALATAN) 0.005 % ophthalmic  solution 1 drop     levothyroxine (SYNTHROID/LEVOTHROID) tablet 137 mcg     lidocaine (LMX4) cream     lidocaine 1 % 0.1-1 mL     lidocaine 1 % 0.1-5 mL     losartan (COZAAR) tablet 100 mg     magnesium hydroxide (MILK OF MAGNESIA) suspension 30 mL     melatonin tablet 1 mg     naloxone (NARCAN) injection 0.2 mg    Or     naloxone (NARCAN) injection 0.4 mg    Or     naloxone (NARCAN) injection 0.2 mg    Or     naloxone (NARCAN) injection 0.4 mg     ondansetron (ZOFRAN ODT) ODT tab 4 mg     ondansetron (ZOFRAN ODT) ODT tab 4 mg    Or     ondansetron (ZOFRAN) injection 4 mg     oxyCODONE (ROXICODONE) tablet 5 mg    Or     oxyCODONE (ROXICODONE) tablet 10 mg     pantoprazole (PROTONIX) EC tablet 40 mg     polyethylene glycol (MIRALAX) Packet 17 g     prochlorperazine (COMPAZINE) injection 10 mg    Or     prochlorperazine (COMPAZINE) tablet 10 mg     ropivacaine (NAROPIN) 300 mg, EPINEPHrine (ADRENALIN) 0.6 mg in sodium chloride 0.9 % 100 mL (ORTHO EDNA CUSTOM DOSE)     senna-docusate (SENOKOT-S/PERICOLACE) 8.6-50 MG per tablet 1 tablet     senna-docusate (SENOKOT-S/PERICOLACE) 8.6-50 MG per tablet 1 tablet    Or     senna-docusate (SENOKOT-S/PERICOLACE) 8.6-50 MG per tablet 2 tablet     simvastatin (ZOCOR) tablet 20 mg     sodium chloride (PF) 0.9% PF flush 10 mL     sodium chloride (PF) 0.9% PF flush 10-20 mL     sodium chloride (PF) 0.9% PF flush 10-40 mL     sodium chloride (PF) 0.9% PF flush 3 mL     sodium chloride (PF) 0.9% PF flush 3 mL     sodium chloride tablet 1 g     Vitamin D3 (CHOLECALCIFEROL) tablet 1,000 Units          Data:      All new lab and imaging data was reviewed.    Data reviewed today: I reviewed all new labs and imaging results over the last 24 hours. I personally reviewed   Recent Labs   Lab 12/26/22  0716 12/25/22  0825 12/24/22  0713 12/23/22  0805   WBC 9.6  --  9.6 11.9*   HGB 8.5* 9.3* 8.0* 8.5*   HCT 25.9*  --  24.9* 26.2*   MCV 87  --  88 88     --  308 270     No results for  input(s): CULT in the last 168 hours.  Recent Labs   Lab 12/28/22  0107 12/27/22  2225 12/27/22  2200 12/27/22  1708 12/27/22  1436 12/27/22  0749 12/27/22  0637 12/26/22  0755 12/26/22  0716 12/24/22  0724 12/24/22  0713 12/23/22  0906 12/23/22  0805 12/23/22  0128 12/22/22  1827   NA  --  127*  --   --  127*  --  127*   < > 129*  130*  130*   < > 128*  128*   < > 127*  127*   < > 118*   POTASSIUM  --   --   --   --   --   --   --   --  4.7  --  4.1  --  4.1  --  4.7   CHLORIDE  --   --   --   --   --   --   --   --  96*  --  94*  --  93*  --  82*   CO2  --   --   --   --   --   --   --   --  23  --  21*  --  20*  --  22   ANIONGAP  --   --   --   --   --   --   --   --  11  --  13  --  14  --  14   *  --  256* 152*  --    < >  --    < > 87   < > 149*   < > 168*   < > 253*   BUN  --   --   --   --   --   --   --   --  14.0  --  12.2  --  15.0  --  16.7   CR  --   --   --   --   --   --   --   --  1.31*  --  1.21*  --  1.36*  --  1.51*   GFRESTIMATED  --   --   --   --   --   --   --   --  61  --  67  --  58*  --  51*   CHAR  --   --   --   --   --   --   --   --  9.3  --  8.6*  --  8.0*  --  8.3*   PROTTOTAL  --   --   --   --   --   --   --   --   --   --   --   --   --   --  7.7   ALBUMIN  --   --   --   --   --   --   --   --   --   --   --   --   --   --  3.8   BILITOTAL  --   --   --   --   --   --   --   --   --   --   --   --   --   --  0.8   ALKPHOS  --   --   --   --   --   --   --   --   --   --   --   --   --   --  115   AST  --   --   --   --   --   --   --   --   --   --   --   --   --   --  36   ALT  --   --   --   --   --   --   --   --   --   --   --   --   --   --  38    < > = values in this interval not displayed.       Recent Labs   Lab 12/28/22  0107 12/27/22  2200 12/27/22  1708 12/27/22  1151 12/27/22  0749   * 256* 152* 252* 84     No results for input(s): INR in the last 168 hours.    No results for input(s): TROPONIN, TROPI, TROPR in the last 168 hours.    Invalid input(s):  TROP, TROPONINIES    Recent Results (from the past 48 hour(s))   Chest XR,  PA & LAT    Narrative    EXAM: XR CHEST 2 VIEWS  LOCATION: [facility]  DATE/TIME: 12/22/2022 4:55 PM    INDICATION: sob  COMPARISON: None.      Impression    IMPRESSION: Heart size is normal. Streaky opacities are present in both lung bases, atelectasis versus developing pneumonia. Follow-up advised.. Upper lobes are clear. No pneumothorax. No effusions. No pneumothorax.   XR Finger Right G/E 2 Views    Narrative    EXAM: XR FINGER RIGHT G/E 2 VIEWS  LOCATION: [facility]  DATE/TIME: 12/22/2022 7:21 PM    INDICATION: Right third finger pain and swelling.  COMPARISON: None.      Impression    IMPRESSION:     Marked soft tissue swelling throughout the right third finger. Severe flexion deformity and dislocation at the DIP joint, compatible with disruption of the common extensor tendon.     Erosion of the tuft of the distal phalanx is highly suspicious for osteomyelitis.     There is skin irregularity/ulceration in the tip of the finger, and there are a few small ossific fragments, which may be erosions related to osteomyelitis, or could represent an avulsion fracture related to prior tendon disruption. No erosive change,   cortical defect, or focal lucency within the middle or proximal phalanges. The PIP and MCP joints are normally aligned.     Marked soft tissue swelling is also present throughout the dorsum of the hand. No other fracture or erosion. No other joint malalignment.       COVID Status:  COVID-19 PCR Results    COVID-19 PCR Results 12/22/22   SARS CoV2 PCR Negative      Comments are available for some flowsheets but are not being displayed.         COVID-19 Antibody Results, Testing for Immunity    COVID-19 Antibody Results, Testing for Immunity   No data to display.

## 2022-12-28 NOTE — PLAN OF CARE
To Do:  End of Shift Summary  For vital signs and complete assessments, please see documentation flowsheets.     Pertinent assessments: Contact precautions. BP elevated. AO. Denies pain. Intermittent, dry cough. LS clear, BS hypo. Uses CPAP at night. Baseline numbness/tingling in feet, tremor in hands. Scabs on hands. R 3rd finger swollen and red. Wrapped in gauze temporarily, keeping tip of finger ROMAN. R hand non-wgt bearing. Up to bathroom x4 with walker. SBA, bed alarms on. Regular/mod carb diet. Pt slept on/off.   Major Shift Events: None.   Treatment Plan: Monitor pain, swelling, wound, mobility. Continue pain mgmt, wound care. Discharge to TCU possibly 12/28.    Bedside Nurse: Haley Reagan RN

## 2022-12-28 NOTE — PLAN OF CARE
Goal Outcome Evaluation:    To Do:  End of Shift Summary  For vital signs and complete assessments, please see documentation flowsheets.     Pertinent assessments: A&O x4. VSS. LS clear. Denies pain to finger. PRN tylenol given for headache. Up A1 w/ gait belt & platform walker. NWB to R hand. BG's 84, 252, & 152.    Major Shift Events: IVF d/c'd. PO NaCl started. Midline placed in left arm. Dressing changed x 2.    Treatment Plan: Pain management, encourage ambulation, NWB to R hand, BS checks. Likely discharge to TCU in 1-2 days.    Bedside Nurse: Fiona Kaye, RN

## 2022-12-28 NOTE — PROGRESS NOTES
[facility]    Infectious Disease Progress Note    Date of Service : 12/28/2022     Assessment:  62-year-old male with chronic right third finger distal ulcer who has been hospitalized with worsening and evidence of osteomyelitis on imaging and is s/p i7D wit partial amputation. Cxs are growing s.aurues sensitivity pending    -Right third finger chronic ulcer with osteomyelitis and concern for septic tenosynovitis  -CKD  -Hyponatremia  -History of chronic and recurrent hand and foot infections and wounds, followed in the wound care center.    -Neuropathy  -History of methicillin-resistant Staphylococcus aureus, including from fingers.  -Diabetes mellitus.  -Ongoing cough for the last 2 months.  Given antibiotics recently, but nothing for bacterial pneumonia and unlikely to be the case, probably post-COVID type cough, some fluid present so possibly cardiac      Recommendations:  1.  Op noted, cultures with staph and strep, on Ancef now and tolerating well  2 by initial appearance and even current appearance still somewhat concerned about proximal tenosynovitis, had I&D partially into the tendon sheath and looked infected, I favor at least a couple of weeks of IV antibiotics if were not going to do nothing more surgically or image any further and see how he does if not improving then consider reimaging.  If he improves possibly oral longer at that point, presumptively all osteomyelitis has been resected.   Place midline and check on home IV options looks like rehab is planned, orders in for the antibiotic    Hebert Preciado MD    Interval History   Patient was seen and examined, chart reviewed  S/p right long finger irrigation and debridement with partial amputation of the right long finger. Cxs are growing s.aureus, sensitivity is MSSA . Remained afebrile and is tolerating antibiotics without side effects    Physical Exam   Temp: 97.9  F (36.6  C) Temp src: Oral BP: (!) 173/86 Pulse: 60   Resp:  20 SpO2: 97 % O2 Device: None (Room air)    Vitals:    12/22/22 1628 12/23/22 0036   Weight: 113.4 kg (250 lb) 110.9 kg (244 lb 6.4 oz)     Vital Signs with Ranges  Temp:  [97.9  F (36.6  C)-98.4  F (36.9  C)] 97.9  F (36.6  C)  Pulse:  [57-66] 60  Resp:  [18-20] 20  BP: (146-173)/(66-86) 173/86  SpO2:  [95 %-97 %] 97 %    Constitutional: Awake, alert, cooperative, no apparent distress  Lungs: Clear to auscultation bilaterally, no crackles or wheezing  Cardiovascular: Regular rate and rhythm, normal S1 and S2, and no murmur noted  Abdomen: Normal bowel sounds, soft, non-distended, non-tender  Skin: No rash  MS :R hand long finer very swollen, red .     Other:    Medications       atenolol  25 mg Oral Daily     buPROPion  150 mg Oral QPM     calcium carbonate-vitamin D  1 tablet Oral Daily     ceFAZolin  2 g Intravenous Q8H     citalopram  40 mg Oral Daily     cyanocobalamin  250 mcg Oral Daily     famotidine  40 mg Oral Daily     ferrous sulfate  325 mg Oral Daily with breakfast     finasteride  5 mg Oral Daily     glipiZIDE  20 mg Oral Daily     insulin aspart  1-10 Units Subcutaneous TID AC     insulin aspart  1-7 Units Subcutaneous At Bedtime     insulin glargine  30 Units Subcutaneous At Bedtime     latanoprost  1 drop Both Eyes At Bedtime     levothyroxine  137 mcg Oral Daily     losartan  100 mg Oral Daily     ondansetron  4 mg Oral At Bedtime     polyethylene glycol  17 g Oral Daily     ropivacaine 300 mg, EPINEPHrine 0.6 mg in saline 100 mL - customizable   INTRA-ARTICULAR On Call to OR     senna-docusate  1 tablet Oral BID     simvastatin  20 mg Oral At Bedtime     sodium chloride (PF)  10 mL Intracatheter Q8H     sodium chloride (PF)  3 mL Intracatheter Q8H     sodium chloride  1 g Oral BID w/meals     Vitamin D3  1,000 Units Oral Daily       Data   All microbiology laboratory data reviewed.  Recent Labs   Lab Test 12/26/22  0716 12/25/22  0825 12/24/22  0713 12/23/22  0805   WBC 9.6  --  9.6 11.9*   HGB  8.5* 9.3* 8.0* 8.5*   HCT 25.9*  --  24.9* 26.2*   MCV 87  --  88 88     --  308 270     Recent Labs   Lab Test 12/26/22  0716 12/24/22  0713 12/23/22  0805   CR 1.31* 1.21* 1.36*     Component      Latest Ref Rng & Units 12/24/2022   Vancomycin      ug/mL 7.0     Microbiology  12/23 R finger tissue  Finger, Right; Tissue    0 Result Notes  Culture Culture in progress       1+ Staphylococcus aureus

## 2022-12-28 NOTE — PROGRESS NOTES
Care Management Follow Up    Length of Stay (days): 6    Expected Discharge Date: 12/29/2022     Concerns to be Addressed: care coordination/care conferences, discharge planning     Patient plan of care discussed at interdisciplinary rounds: Yes    Anticipated Discharge Disposition: Transitional Care     Anticipated Discharge Services: None  Anticipated Discharge DME: None    Patient/family educated on Medicare website which has current facility and service quality ratings: yes  Education Provided on the Discharge Plan:  Yes  Patient/Family in Agreement with the Plan: yes    Referrals Placed by CM/SW: Post Acute Facilities  Private pay costs discussed: private room/amenity fees    Additional Information:  Care management following for discharge planning to recommended TCU.  Patient agreeable to TCU, preferring a shared room and a location close to home in [facility]. First preference is [facility]. TCU referrals sent.   Anticipate needing IV Abx upon discharge, currently on Ancef 2 gm every 8 hours.       Cara Garcia, RN      Cara Garcia RN Case Manager  Inpatient Care Coordination  [facility]   [phone]

## 2022-12-28 NOTE — PLAN OF CARE
[id number]     Vitals: Slightly elevated BP, otherwise stable  Neuro: A/Ox4  Cardiac: normal rate and rhythm  GI: no n/v, BMx1  : voiding well  Resp: on room air, no SOB  Activity: Ax1 with platform walker to bathroom  Pain: denies pain  Skin: R hand incision CDI, minimal swelling and redness, baseline numbness on all extremities     Plan: Ancef, monitor incision & elevate R hand, NWB to R hand, Na check Q8

## 2022-12-29 ENCOUNTER — LAB REQUISITION (OUTPATIENT)
Dept: LAB | Facility: CLINIC | Age: 62
End: 2022-12-29
Payer: MEDICARE

## 2022-12-29 ENCOUNTER — APPOINTMENT (OUTPATIENT)
Dept: PHYSICAL THERAPY | Facility: CLINIC | Age: 62
DRG: 854 | End: 2022-12-29
Payer: MEDICARE

## 2022-12-29 VITALS
BODY MASS INDEX: 40.72 KG/M2 | DIASTOLIC BLOOD PRESSURE: 65 MMHG | OXYGEN SATURATION: 95 % | TEMPERATURE: 97.5 F | HEART RATE: 51 BPM | WEIGHT: 244.4 LBS | SYSTOLIC BLOOD PRESSURE: 148 MMHG | RESPIRATION RATE: 20 BRPM | HEIGHT: 65 IN

## 2022-12-29 VITALS
WEIGHT: 244.4 LBS | DIASTOLIC BLOOD PRESSURE: 72 MMHG | BODY MASS INDEX: 40.72 KG/M2 | TEMPERATURE: 98.1 F | OXYGEN SATURATION: 95 % | RESPIRATION RATE: 18 BRPM | SYSTOLIC BLOOD PRESSURE: 142 MMHG | HEIGHT: 65 IN | HEART RATE: 72 BPM

## 2022-12-29 DIAGNOSIS — Z11.1 ENCOUNTER FOR SCREENING FOR RESPIRATORY TUBERCULOSIS: ICD-10-CM

## 2022-12-29 DIAGNOSIS — M86.9 OSTEOMYELITIS, UNSPECIFIED (H): ICD-10-CM

## 2022-12-29 DIAGNOSIS — R12 HEARTBURN: ICD-10-CM

## 2022-12-29 DIAGNOSIS — R39.11 HESITANCY OF MICTURITION: ICD-10-CM

## 2022-12-29 LAB
ANION GAP SERPL CALCULATED.3IONS-SCNC: 10 MMOL/L (ref 7–15)
BUN SERPL-MCNC: 19.8 MG/DL (ref 8–23)
CALCIUM SERPL-MCNC: 9.4 MG/DL (ref 8.8–10.2)
CHLORIDE SERPL-SCNC: 93 MMOL/L (ref 98–107)
CREAT SERPL-MCNC: 1.31 MG/DL (ref 0.67–1.17)
CRP SERPL-MCNC: 8.14 MG/L
DEPRECATED HCO3 PLAS-SCNC: 25 MMOL/L (ref 22–29)
GFR SERPL CREATININE-BSD FRML MDRD: 61 ML/MIN/1.73M2
GLUCOSE BLDC GLUCOMTR-MCNC: 113 MG/DL (ref 70–99)
GLUCOSE BLDC GLUCOMTR-MCNC: 218 MG/DL (ref 70–99)
GLUCOSE BLDC GLUCOMTR-MCNC: 68 MG/DL (ref 70–99)
GLUCOSE BLDC GLUCOMTR-MCNC: 89 MG/DL (ref 70–99)
GLUCOSE SERPL-MCNC: 68 MG/DL (ref 70–99)
POTASSIUM SERPL-SCNC: 4.8 MMOL/L (ref 3.4–5.3)
SODIUM SERPL-SCNC: 128 MMOL/L (ref 136–145)

## 2022-12-29 PROCEDURE — 80048 BASIC METABOLIC PNL TOTAL CA: CPT | Performed by: INTERNAL MEDICINE

## 2022-12-29 PROCEDURE — 250N000011 HC RX IP 250 OP 636: Performed by: SPECIALIST

## 2022-12-29 PROCEDURE — 36415 COLL VENOUS BLD VENIPUNCTURE: CPT | Performed by: INTERNAL MEDICINE

## 2022-12-29 PROCEDURE — 250N000013 HC RX MED GY IP 250 OP 250 PS 637: Performed by: INTERNAL MEDICINE

## 2022-12-29 PROCEDURE — 250N000013 HC RX MED GY IP 250 OP 250 PS 637: Performed by: ORTHOPAEDIC SURGERY

## 2022-12-29 PROCEDURE — 99239 HOSP IP/OBS DSCHRG MGMT >30: CPT | Performed by: INTERNAL MEDICINE

## 2022-12-29 PROCEDURE — 97116 GAIT TRAINING THERAPY: CPT | Mod: GP | Performed by: PHYSICAL THERAPIST

## 2022-12-29 PROCEDURE — 86140 C-REACTIVE PROTEIN: CPT | Performed by: PHYSICIAN ASSISTANT

## 2022-12-29 PROCEDURE — 99232 SBSQ HOSP IP/OBS MODERATE 35: CPT | Performed by: INTERNAL MEDICINE

## 2022-12-29 PROCEDURE — 97530 THERAPEUTIC ACTIVITIES: CPT | Mod: GP | Performed by: PHYSICAL THERAPIST

## 2022-12-29 RX ORDER — OXYCODONE HYDROCHLORIDE 5 MG/1
2.5-5 TABLET ORAL EVERY 6 HOURS PRN
Qty: 10 TABLET | Refills: 0 | Status: SHIPPED | OUTPATIENT
Start: 2022-12-29 | End: 2023-01-07

## 2022-12-29 RX ADMIN — LOSARTAN POTASSIUM 100 MG: 100 TABLET, FILM COATED ORAL at 08:27

## 2022-12-29 RX ADMIN — GLIPIZIDE 20 MG: 10 TABLET, EXTENDED RELEASE ORAL at 08:27

## 2022-12-29 RX ADMIN — SENNOSIDES AND DOCUSATE SODIUM 1 TABLET: 8.6; 5 TABLET ORAL at 08:27

## 2022-12-29 RX ADMIN — LEVOTHYROXINE SODIUM 137 MCG: 0.11 TABLET ORAL at 08:27

## 2022-12-29 RX ADMIN — CEFAZOLIN SODIUM 2 G: 2 INJECTION, SOLUTION INTRAVENOUS at 04:55

## 2022-12-29 RX ADMIN — Medication 1000 UNITS: at 08:27

## 2022-12-29 RX ADMIN — CYANOCOBALAMIN TAB 500 MCG 250 MCG: 500 TAB at 08:27

## 2022-12-29 RX ADMIN — ATENOLOL 25 MG: 25 TABLET ORAL at 08:27

## 2022-12-29 RX ADMIN — SODIUM CHLORIDE 1 G: 1 TABLET ORAL at 08:27

## 2022-12-29 RX ADMIN — CITALOPRAM HYDROBROMIDE 40 MG: 20 TABLET ORAL at 08:28

## 2022-12-29 RX ADMIN — Medication 1 TABLET: at 08:40

## 2022-12-29 RX ADMIN — FINASTERIDE 5 MG: 5 TABLET, FILM COATED ORAL at 08:27

## 2022-12-29 RX ADMIN — POLYETHYLENE GLYCOL 3350 17 G: 17 POWDER, FOR SOLUTION ORAL at 08:27

## 2022-12-29 RX ADMIN — FAMOTIDINE 40 MG: 20 TABLET, FILM COATED ORAL at 08:28

## 2022-12-29 RX ADMIN — FERROUS SULFATE TAB 325 MG (65 MG ELEMENTAL FE) 325 MG: 325 (65 FE) TAB at 08:27

## 2022-12-29 RX ADMIN — CEFAZOLIN SODIUM 2 G: 2 INJECTION, SOLUTION INTRAVENOUS at 12:37

## 2022-12-29 ASSESSMENT — ACTIVITIES OF DAILY LIVING (ADL)
ADLS_ACUITY_SCORE: 24

## 2022-12-29 NOTE — DISCHARGE INSTRUCTIONS
Dressing Change instructions for right finger:        Frequency after first dressing change: DAILY and PRN   Dressing type: xeroform, gauze   Location of dressing Finger

## 2022-12-29 NOTE — PROGRESS NOTES
You have successfully submitted the preadmission screening (PAS) to the Senior [employer] on:  12/29/2022 3:31 PM  Your confirmation number is: [id number]      Cara Garcia RN Case Manager  Inpatient Care Coordination  [facility]   [phone]

## 2022-12-29 NOTE — PROGRESS NOTES
Care Management Discharge Note    Discharge Date: 12/29/2022       Discharge Disposition: Transitional Care, Skilled Nursing Facility    Discharge Services: None    Discharge DME: None    Discharge Transportation: car, drives self, family or friend will provide    Private pay costs discussed: Not applicable    PAS Confirmation Code:    Patient/family educated on Medicare website which has current facility and service quality ratings: yes    Education Provided on the Discharge Plan:  yes  Persons Notified of Discharge Plans: patient and spouse  Patient/Family in Agreement with the Plan: yes    Handoff Referral Completed: Yes    Additional Information:  Patient is medically ready for discharge. Patient has been accepted at [facility] and [facility]. Patient and spouses preference is [facility]. Contacted admissions at [facility] and let them know that patient has accepted their bed.  They can accept the patient after they have had a chance to review orders.   MD aware of bed availability and orders completed and faxed.  Discussed transport and timing with patient and spouse. Have asked spouse to be here around 1600.  Narcotic script faxed to facility by KADI Carballo RN BSN OCN  Care Coordinator  [facility]  [phone]

## 2022-12-29 NOTE — PLAN OF CARE
To Do:  End of Shift Summary  For vital signs and complete assessments, please see documentation flowsheets.     Pertinent assessments:  A&O x4. VSS, LS diminished. Denies pain to finger. Up SBA w/gait belt & platform walker. NWB to R hand.     Major Shift Events: Tearful near end of shift. Reports he is struggling w/things at home and he feels very lonely here. Spiritual health consult placed.    Treatment Plan: Monitor pain, swelling, wound, mobility. Continue pain mgmt, wound care. Discharge pending, referrals to TCU's sent out.    Bedside Nurse: Leigh Ash RN

## 2022-12-29 NOTE — PLAN OF CARE
Goal Outcome Evaluation:    To Do:  End of Shift Summary  For vital signs and complete assessments, please see documentation flowsheets.     Pertinent assessments:  A&O x4. VSS, except BP slightly elevated this AM. LS diminished. Denies pain to finger. Up SBA w/gait belt & platform walker. NWB to R hand. BG's 79, 229, and 180.    Major Shift Events: Tearful near end of shift. Reports he is struggling w/things at home and he feels very lonely here. Spiritual health consult placed.    Treatment Plan: Monitor pain, swelling, wound, mobility. Continue pain mgmt, wound care. Discharge pending, referrals to TCU's sent out.    Bedside Nurse: Fiona Kaye

## 2022-12-29 NOTE — PLAN OF CARE
Goal Outcome Evaluation:  Assumed care of pt from [id number]. VSS. A&Ox4. Apical pulse regular. Lungs CTA. Bowel sounds active in all quadrants. Tolerating diet. Voiding spontaneously with adequate output. Midline in place. Denies pain. Wound care completed and pt ok to discharge to TCU per ortho. Pt accepted to TCU. Spouse will drive over. Pt needs AVS and medications.

## 2022-12-29 NOTE — PROGRESS NOTES
[facility]    Infectious Disease Progress Note    Date of Service : 12/29/2022     Assessment:  62-year-old male with chronic right third finger distal ulcer who has been hospitalized with worsening and evidence of osteomyelitis on imaging and is s/p i7D wit partial amputation. Cxs are growing s.aurues sensitivity pending    -Right third finger chronic ulcer with osteomyelitis and concern for septic tenosynovitis  -CKD  -Hyponatremia  -History of chronic and recurrent hand and foot infections and wounds, followed in the wound care center.    -Neuropathy  -History of methicillin-resistant Staphylococcus aureus, including from fingers.  -Diabetes mellitus.  -Ongoing cough for the last 2 months.  Given antibiotics recently, but nothing for bacterial pneumonia and unlikely to be the case, probably post-COVID type cough, some fluid present so possibly cardiac      Recommendations:  1.  Op noted, cultures with staph and strep, on Ancef now and tolerating well  2 by initial appearance and even current appearance still somewhat concerned about proximal tenosynovitis, had I&D partially into the tendon sheath and looked infected, I favor at least a couple of weeks of IV antibiotics if were not going to do nothing more surgically or image any further and see how he does if not improving then consider reimaging.  If he improves possibly oral longer at that point, presumptively all osteomyelitis has been resected.   Place midline and check on home IV options looks like rehab is plann ? today, orders in for the antibiotic, possibly po longer when IV completed    Hebert Preciado MD    Interval History   Patient was seen and examined, chart reviewed  Finger better per ortho  S/p right long finger irrigation and debridement with partial amputation of the right long finger. Cxs are growing s.aureus, sensitivity is MSSA . Remained afebrile and is tolerating antibiotics without side effects    Physical Exam    Temp: 97.5  F (36.4  C) Temp src: Oral BP: (!) 148/65 Pulse: 51   Resp: 20 SpO2: 95 % O2 Device: BiPAP/CPAP    Vitals:    12/22/22 1628 12/23/22 0036   Weight: 113.4 kg (250 lb) 110.9 kg (244 lb 6.4 oz)     Vital Signs with Ranges  Temp:  [97.5  F (36.4  C)-98.5  F (36.9  C)] 97.5  F (36.4  C)  Pulse:  [51-64] 51  Resp:  [18-20] 20  BP: (146-148)/(64-71) 148/65  SpO2:  [93 %-96 %] 95 %    Constitutional: Awake, alert, cooperative, no apparent distress  Lungs: Clear to auscultation bilaterally, no crackles or wheezing  Cardiovascular: Regular rate and rhythm, normal S1 and S2, and no murmur noted  Abdomen: Normal bowel sounds, soft, non-distended, non-tender  Skin: No rash  MS :R hand long finer very swollen, red .     Other:    Medications       atenolol  25 mg Oral Daily     buPROPion  150 mg Oral QPM     calcium carbonate-vitamin D  1 tablet Oral Daily     ceFAZolin  2 g Intravenous Q8H     citalopram  40 mg Oral Daily     cyanocobalamin  250 mcg Oral Daily     famotidine  40 mg Oral Daily     ferrous sulfate  325 mg Oral Daily with breakfast     finasteride  5 mg Oral Daily     glipiZIDE  20 mg Oral Daily     insulin aspart  1-10 Units Subcutaneous TID AC     insulin aspart  1-7 Units Subcutaneous At Bedtime     insulin glargine  30 Units Subcutaneous At Bedtime     latanoprost  1 drop Both Eyes At Bedtime     levothyroxine  137 mcg Oral Daily     losartan  100 mg Oral Daily     ondansetron  4 mg Oral At Bedtime     polyethylene glycol  17 g Oral Daily     ropivacaine 300 mg, EPINEPHrine 0.6 mg in saline 100 mL - customizable   INTRA-ARTICULAR On Call to OR     senna-docusate  1 tablet Oral BID     simvastatin  20 mg Oral At Bedtime     sodium chloride (PF)  10 mL Intracatheter Q8H     sodium chloride (PF)  3 mL Intracatheter Q8H     sodium chloride  1 g Oral BID w/meals     Vitamin D3  1,000 Units Oral Daily       Data   All microbiology laboratory data reviewed.  Recent Labs   Lab Test 12/26/22  0716  12/25/22  0825 12/24/22  0713 12/23/22  0805   WBC 9.6  --  9.6 11.9*   HGB 8.5* 9.3* 8.0* 8.5*   HCT 25.9*  --  24.9* 26.2*   MCV 87  --  88 88     --  308 270     Recent Labs   Lab Test 12/29/22  0836 12/26/22  0716 12/24/22  0713   CR 1.31* 1.31* 1.21*     Component      Latest Ref Rng & Units 12/24/2022   Vancomycin      ug/mL 7.0     Microbiology  12/23 R finger tissue  Finger, Right; Tissue    0 Result Notes  Culture Culture in progress       1+ Staphylococcus aureus

## 2022-12-29 NOTE — DISCHARGE SUMMARY
[facility]  Hospitalist Discharge Summary      Date of Admission:  12/22/2022  Date of Discharge:  12/29/2022  Discharging Provider: Leodan Harris MD  Discharge Service: Hospitalist Service    Discharge Diagnoses   Right third finger osteomyelitis  Probable tenosynovitis right hand  [place] related to sepsis  Acute sepsis due to above  Diabetic neuropathy with hand deformity  Type 2 diabetes mellitus  Obstructive sleep apnea  Diabetic neuropathy  Obesity  Hypertension  Hypercholesteremia  Hypothyroidism  Depression  Acute kidney injury on CKD  Cough which is resolving      Follow-ups Needed After Discharge   Follow-up Appointments     Follow Up Care      Please follow-up with Dr English in 5-7 days.  Call [phone] to   schedule this appointment.         Follow Up and recommended labs and tests      Follow up with [facility] physician.  The following labs/tests are   recommended: CBC and BMP on Ancef therapy.     Plan by ID is to monitor if the infection in the right hand improves in 2   weeks  If infection improves plan to do oral antibiotics for more time  If it does not improve the plan to do more radiology call evaluation and   continuing of IV antibiotic therapy      Follow-up with Dr. Silveira infectious diseases [place] in 2 weeks time   regarding osteomyelitis third finger right hand.             Unresulted Labs Ordered in the Past 30 Days of this Admission     Date and Time Order Name Status Description    12/23/2022  4:10 PM Anaerobic Bacterial Culture Routine Preliminary     12/23/2022  3:22 PM Anaerobic Bacterial Culture Routine Preliminary       These results will be followed up by Lisa Pierre and infectious diseases physician Dr. Cielo VERNON    Discharge Disposition   Discharged to skilled nursing facility  Condition at discharge: Hemodynamically stable      Hospital Course   Steve Morgan is a pleasant 62-year-old gentleman with known history of type 2 diabetes mellitus  with diabetic neuropathy, DAMIÁN, obesity, HTN, HLP, hypothyroidism, depression and deformities in the hands who came in to [facility] 12/22/2022 with symptoms of cough which has been resolving and right hand wound with swelling.  Since 12/20/2022 he had developed swelling of the right third finger with erythema of the third finger on the day of admission his temperature was 98.8  F pulse 103 blood pressure 174/92 oxygen saturation 97% on room air sodium was low at 118 potassium 4.7 chloride 1.5 lactic acid 0.9 WBC 15.6 hemoglobin 9.1 chest x-ray showed streaky opacities of the both lungs suspicious for atelectasis there was question both pneumonia but patient did not show any other signs of pneumonia.   X-ray of the right hand showed marked soft tissue swelling throughout the right third finger, severe flexion deformity and suspicion for osteomyelitis.  Patient was evaluated by orthopedic surgery after starting of IV Zosyn and vancomycin therapy in the emergency department which were changed to ceftriaxone and vancomycin.  When MSSA was diagnosed this was changed to cefazolin.  Infectious diseases evaluated the patient and plans to give cefazolin for another 2 weeks as outpatient.  If the patient improves want to change cefazolin to oral antibiotic and if not to evaluate patient with further radiology evaluation and continue IV antibiotics.  This was all done after patient had right long finger irrigation and debridement with partial amputation of the right long finger by Erick Rain MD 12/23/2020.     Consultations This Hospital Stay   PHARMACY TO DOSE VANCO  PHARMACY TO DOSE VANCO  ORTHOPEDIC SURGERY IP CONSULT  INFECTIOUS DISEASES IP CONSULT  ORTHOPEDIC SURGERY IP CONSULT  PHYSICAL THERAPY ADULT IP CONSULT  CARE MANAGEMENT / SOCIAL WORK IP CONSULT  OCCUPATIONAL THERAPY ADULT IP CONSULT  CARE MANAGEMENT / SOCIAL WORK IP CONSULT  VASCULAR ACCESS ADULT IP CONSULT  SPIRITUAL HEALTH SERVICES IP  "CONSULT  PHYSICAL THERAPY ADULT IP CONSULT  OCCUPATIONAL THERAPY ADULT IP CONSULT    Code Status   Full Code    Time Spent on this Encounter   I, Leodan Harris MD, personally saw the patient today and spent greater than 30 minutes discharging this patient.       Leodan Harris MD  [place] [address] MEDICAL SURG[facility]  [address]  [facility] [address]  Phone: [phone]  Fax: [phone]  ______________________________________________________________________    Physical Exam   Vital Signs: Temp: 97.5  F (36.4  C) Temp src: Oral BP: (!) 148/65 Pulse: 51   Resp: 20 SpO2: 95 % O2 Device: BiPAP/CPAP    Weight: 244 lbs 6.4 oz    Patient is not in any acute distress sitting up in chair comfortably   heart S1-S2 regular rate and rhythm  Lungs are clear to auscultation without any crepitation wheezing  CNS patient is alert and oriented x3 moving all the 4 extremities  Right hand middle finger was in dressing which was not open         Primary Care Physician   Lisa Pierre    Discharge Orders      Primary Care - Care Coordination Referral      When to call - Contact Surgeon Team    You may experience symptoms that require follow-up before your scheduled appointment. Refer to the \"Stoplight Tool\" for instructions on when to contact your Surgeon Team if you are concerned about pain control, blood clots, constipation, or if you are unable to urinate.     Discharge Instruction - Breathing exercises    Perform breathing exercises using your Incentive Spirometer 10 times per hour while awake for 2 weeks.     Symptoms - Constipation management    Constipation (hard, dry bowel movements) is not uncommon after an orthopedic injury due to the combination of being less active and opioid pain medication.  You can do the following to help reduce constipation:  ~  FLUIDS:  Drink clear liquids (water or Gatorade), or juice (apple/prune).  ~  DIET:  Eat a fiber rich diet.    ~  ACTIVITY:  Get up and move around " several times a day.  Increase your activity as you are able.  MEDICATIONS:  Reduce the risk of constipation by starting medications before you are constipated.  You can take Miralax (1 capful mixed in at least 8 oz fluids, 1-2 times per day) and/or a laxative (Senokot-S, 1-2 tablets taken 1-2 times a day).  Do not take bowel meds if you have loose stools.  If you already have constipation and these medications are not working, you try over-the-counter milk of magnesia, Dulcolax suppositories, or magnesium citrate (use as per package instructions).  Call your primary care provider if it has been greater than 3 days since your last bowel movement.     Comfort and Pain Management - Pain after Surgery    Pain after surgery is normal and expected.  You will have some amount of pain for several weeks after surgery.  Your pain will improve with time.  There are several things you can do to help reduce your pain including: rest, ice, elevation, and using pain medications as needed. Contact your Surgeon Team if you have pain that persists or worsens after surgery despite rest, ice, elevation, and taking your medication(s) as prescribed. Contact your Surgeon Team if you have new numbness, tingling, or weakness in your operative extremity.     Comfort and Pain Management - Cold therapy    Ice can be used to control swelling and discomfort after injury or surgery  Place a thin towel over your affected joint and lay an ice pack or bag or frozen veggies overtop.  Leave the ice pack in place for 20-30 minutes, then remove and leave off for 20 minutes. Repeat this 20 minutes on - 20 minutes off routine as often as tolerated.  Monitor skin for any signs of frostbite (skin that stays white and numb even after the ice has been removed for 20 minutes, etc).     Medication Instructions - Acetaminophen (TYLENOL) Instructions    If tolerated, please use acetaminophen (TYLENOL) for pain relief.  This can be used in addition to an NSAID  and an opiate/narcotic medication.  Acetaminophen is most effective for managing pain symptoms when taken on a schedule  (every four, six, or eight hours).  For people without liver disease, max acetaminophen dose is 4000 mg in 24 hours from all medications.  Do NOT exceed this daily dose.  Most patients use acetaminophen for pain control for the first four weeks after injury.  You can wean from this medication as your pain decreases.  If able to use NSAIDs, you may take acetaminophen with ibuprofen to maximize pain relief from non-narcotic sources.  You can either alternate the medications (for instance, take acetaminophen, then 3-4 hours later take ibuprofen, then 3-4 hours later take acetaminophen, etc)  OR you can take them together at the same time 3-4 times per day (take acetaminophen WITH ibuprofen all at once every 6-8 hours.)     Medication Instructions - NSAID Instructions    If tolerated, you may use an over-to-counter anti-inflammatory medication in combination with acetaminophen for maximize pain relief.  If pain is severe, you can also use the narcotic/opiate medication prescribed by your Ortho team.  You can either alternate the anti-inflammatory medication (ibuprofen or naproxen) with acetaminophen (for instance, take acetaminophen, then 3-4 hours later take ibuprofen or naproxen, then 3-4 hours later take acetaminophen, etc)  OR you can take them together in all at once 3-4 times per day (take acetaminophen WITH ibuprofen or naproxen every 6-8 hours.)  Take with food.  If you are on Coumadin, Xarelto, or Eliquis, please do not take ibuprofen or naproxen unless you have been cleared to do so by your Primary provider, Cardiologist, or Pharmacist.     Medication Instructions - Opioid Instructions (0.5 - 1 tablet Q 4-6 hours, MAX 4 tablets)    You are discharging with an opioid medication (oxycodone). This medication should only be taken for severe, breakthrough pain that is not controlled with  acetaminophen (TYLENOL) or ibuprofen. If you rate your pain less than 5, you do not need this medication.  Pain rating 0-4:  You do not need a narcotic/opiate medication.  Use acetaminophen, ibuprofen, elevation, ice, and rest for pain relief.                Pain rating 5-7:  Take 1/2 tablet every 4-6 hours as needed                Pain rating 8-10:  Take 1 tablet every 4-6 hours as needed               Do not exceed 4 tablets per day.  Opiates / narcotics will cause constipation and sleepiness.  No driving if taking this medication.  Use sparingly and with caution - narcotics are known to be habit-forming / addictive.  If you find you are having a hard time weaning off this medication or are concerned that you have developed a dependence, it is very important that you talk to your primary care provider to discuss strategies and create a plan. DO NOT drink alcohol if taking narcotics (such as oxycodone) or other sedating medications.     Medication Instructions - Opioids - Tapering Instructions    In the first three days following surgery, your symptoms may warrant use of the narcotic pain medication every four to six hours as prescribed. This is normal. As your pain symptoms improve, focus your efforts on decreasing (tapering) use of narcotic medications. The most successful tapering strategy is to first, decrease the number of tablets you take every 4-6 hours to the minimum prescribed. Then, increase the amount of time between doses.  For example:  First, taper to   or 1 tablet every 4-6 hours.  Then, taper to   or 1 tablet every 6-8 hours.  Then, taper to   or 1 tablet every 8-10 hours.  Then, taper to   or 1 tablet every 10-12 hours.  Then, taper to   or 1 tablet at bedtime.  The bedtime dose can help with comfort during sleep and is typically the last dose to be discontinued after surgery.     Follow Up Care    Please follow-up with Dr English in 5-7 days.  Call [phone] to schedule this appointment.      Comfort and Pain Management - UPPER extremity Elevation    Swelling is expected for several months after surgery. This type of swelling is usually associated with gravity and activity, and can be improved with elevation.   The best way to do this is to get your hand above your heart by sitting down, resting your elbow on a pillow or arm rest, with your hand in the air. Perform this elevation as often as possible especially for the first two weeks after surgery     Return to Driving    Return to driving - Driving is NOT permitted until directed by your provider. Under no circumstance are you permitted to drive while using narcotic pain medications.     Dressing / Wound Care - NO Tub Bathing    Tub bathing, swimming, or any other activities that will cause your incision to be submerged in water should be avoided until allowed by your Surgeon.     Wrist range of motion    Perform Fist pumps every hour while you are awake.     Activity - Other    OK for light /grasp only.  DO NOT lift anything heavier than a teacup with your surgical hand.     Partial weight bearing    DO NOT lift anything heavier than a teacup with your surgical hand.     Splint / Cast    Keep the cast/splint clean, dry, and intact.  Cover the cast/splint for showering. Notify your Surgeon Team if   you begin to experience new pain or numbness, or skin breakdown.     General info for SNF    Length of Stay Estimate: Short Term Care: Estimated # of Days <30  Condition at Discharge: Improving  Level of care:skilled   Rehabilitation Potential: Good  Admission H&P remains valid and up-to-date: Yes  Recent Chemotherapy: N/A  Use Nursing Home Standing Orders: Yes     Mantoux instructions    Give two-step Mantoux (PPD) Per Facility Policy Yes     Follow Up and recommended labs and tests    [facility] with [facility].  The following labs/tests are recommended: CBC and BMP on Ancef therapy.     Plan by ID is to monitor if the infection in the  right hand improves in 2 weeks  If infection improves plan to do oral antibiotics for more time  If it does not improve the plan to do more radiology call evaluation and continuing of IV antibiotic therapy      Follow-up with Dr. Silveira infectious diseases [place] in 2 weeks time regarding osteomyelitis third finger right hand.     Reason for your hospital stay    Came in with progressive cough right hand wound and swelling of his third finger since 12/20/2022 he was diagnosed with tenosynovitis and osteomyelitis for which she did have partial amputation and is getting antibiotics at least for 2 weeks with a plan to change this to oral antibiotics if improved and if not improved to do further radiology evaluation     Daily weights    Call Provider for weight gain of more than 2 pounds per day or 5 pounds per week.     Glucose monitor nursing POCT    Before meals and at bedtime     Wound care    Site:   Right hand  Instructions: As per wound care     Activity - Up ad ami     Full Code     Physical Therapy Adult Consult    Evaluate and treat as clinically indicated.    Reason: Right hand infection     Occupational Therapy Adult Consult    Evaluate and treat as clinically indicated.    Reason: Right third finger osteomyelitis     Diet    Follow this diet upon discharge: Orders Placed This Encounter      Fluid restriction 1500 ML FLUID      Advance Diet as Tolerated: Regular Diet Adult       Significant Results and Procedures   Most Recent 3 CBC's:  Recent Labs   Lab Test 12/26/22  0716 12/25/22  0825 12/24/22  0713 12/23/22  0805   WBC 9.6  --  9.6 11.9*   HGB 8.5* 9.3* 8.0* 8.5*   MCV 87  --  88 88     --  308 270     Most Recent 3 BMP's:  Recent Labs   Lab Test 12/29/22  1212 12/29/22  0916 12/29/22  0836 12/28/22  2222 12/28/22  1952 12/28/22  0804 12/28/22  0537 12/26/22  0755 12/26/22  0716 12/24/22  0724 12/24/22  0713   NA  --   --  128*  --  126*  --  130*   < > 129*  130*  130*   < > 128*  128*    POTASSIUM  --   --  4.8  --   --   --   --   --  4.7  --  4.1   CHLORIDE  --   --  93*  --   --   --   --   --  96*  --  94*   CO2  --   --  25  --   --   --   --   --  23  --  21*   BUN  --   --  19.8  --   --   --   --   --  14.0  --  12.2   CR  --   --  1.31*  --   --   --   --   --  1.31*  --  1.21*   ANIONGAP  --   --  10  --   --   --   --   --  11  --  13   CHAR  --   --  9.4  --   --   --   --   --  9.3  --  8.6*   * 113* 68*   < >  --    < >  --    < > 87   < > 149*    < > = values in this interval not displayed.     Most Recent 2 LFT's:  Recent Labs   Lab Test 12/22/22  1827 07/30/21  0708 01/16/20  0942   AST 36  --  24   ALT 38 30 34   ALKPHOS 115  --  76   BILITOTAL 0.8  --  0.4     7-Day Micro Results     Collected Updated Procedure Result Status      12/23/2022 2314 12/24/2022 0632 MRSA MSSA PCR, Nasal Swab [[id number]]    Swab from Nares, Bilateral    Final result Component Value   MRSA Target DNA Negative   SA Target DNA Positive            12/23/2022 1532 12/23/2022 2257 Gram Stain [[id number]]   (Abnormal)   Tissue from Finger, Right    Final result Component Value   Gram Stain Result 1+ Gram positive cocci   Gram Stain Result 4+ WBC seen   Predominantly PMNs            12/23/2022 1532 12/26/2022 0859 Tissue Aerobic Bacterial Culture Routine [[id number]]    (Abnormal)   Tissue from Finger, Right    Final result Component Value   Culture 1+ Staphylococcus aureus    Susceptibilities done on previous cultures               12/23/2022 1532 12/28/2022 2146 Anaerobic Bacterial Culture Routine [[id number]]    Tissue from Finger, Right    Preliminary result Component Value   Culture No anaerobic organisms isolated after 5 days  [P]                12/23/2022 1518 12/25/2022 1332 Anaerobic Bacterial Culture Routine [[id number]]   (Abnormal)   Tissue from Finger, Right    Preliminary result Component Value   Culture No anaerobic organisms isolated after 1 day  [P]     1+ Streptococcus  agalactiae (Group B Streptococcus)  [P]     Not isolated or reported on routine aerobic cultureThis organism is susceptible to ampicillin, penicillin, vancomycin and the cephalosporins. If treatment is required and your patient is allergic to penicillin, contact the microbiology lab within 5   days to request susceptibility testing.               12/23/2022 1518 12/23/2022 2153 Gram Stain [[id number]]   (Abnormal)   Tissue from Finger, Right    Final result Component Value   Gram Stain Result 2+ Gram positive cocci   Gram Stain Result 2+ WBC seen            12/23/2022 1518 12/26/2022 0901 Tissue Aerobic Bacterial Culture Routine [[id number]]    (Abnormal)   Tissue from Finger, Right    Final result Component Value   Culture 1+ Staphylococcus aureus        Susceptibility      Staphylococcus aureus      JACKIE      Clindamycin <=0.25 ug/mL Susceptible      Erythromycin <=0.25 ug/mL Susceptible      Gentamicin <=0.5 ug/mL Susceptible      Oxacillin 0.5 ug/mL Susceptible  [1]       Tetracycline <=1 ug/mL Susceptible      Trimethoprim/Sulfamethoxazole <=0.5/9.5 ug/mL Susceptible      Vancomycin <=0.5 ug/mL Susceptible                   [1]  Oxacillin susceptible isolates are susceptible to cephalosporins (example: cefazolin and cephalexin) and beta lactam combination agents. Oxacillin resistant isolates are resistant to these agents.                 12/22/2022 2115 12/28/2022 0531 Blood Culture Hand, Left [[id number]]   Blood from Hand, Left    Final result Component Value   Culture No Growth               12/22/2022 1827 12/27/2022 2346 Blood Culture Peripheral Blood [[id number]]   Peripheral Blood    Final result Component Value   Culture No Growth               12/22/2022 1632 12/22/2022 1741 Symptomatic Influenza A/B & SARS-CoV2 (COVID-19) Virus PCR Multiplex Nasopharyngeal [[id number]]    Swab from Nasopharyngeal    Final result Component Value   Influenza A PCR Negative   Influenza B PCR Negative   RSV PCR  Negative   SARS CoV2 PCR Negative   NEGATIVE: SARS-CoV-2 (COVID-19) RNA not detected, presumed negative.              ,   Results for orders placed or performed during the hospital encounter of 12/22/22   Chest XR,  PA & LAT    Narrative    EXAM: XR CHEST 2 VIEWS  LOCATION: [facility]  DATE/TIME: 12/22/2022 4:55 PM    INDICATION: sob  COMPARISON: None.      Impression    IMPRESSION: Heart size is normal. Streaky opacities are present in both lung bases, atelectasis versus developing pneumonia. Follow-up advised.. Upper lobes are clear. No pneumothorax. No effusions. No pneumothorax.   XR Finger Right G/E 2 Views    Narrative    EXAM: XR FINGER RIGHT G/E 2 VIEWS  LOCATION: [facility]  DATE/TIME: 12/22/2022 7:21 PM    INDICATION: Right third finger pain and swelling.  COMPARISON: None.      Impression    IMPRESSION:     Marked soft tissue swelling throughout the right third finger. Severe flexion deformity and dislocation at the DIP joint, compatible with disruption of the common extensor tendon.     Erosion of the tuft of the distal phalanx is highly suspicious for osteomyelitis.     There is skin irregularity/ulceration in the tip of the finger, and there are a few small ossific fragments, which may be erosions related to osteomyelitis, or could represent an avulsion fracture related to prior tendon disruption. No erosive change,   cortical defect, or focal lucency within the middle or proximal phalanges. The PIP and MCP joints are normally aligned.     Marked soft tissue swelling is also present throughout the dorsum of the hand. No other fracture or erosion. No other joint malalignment.           Discharge Medications   Current Discharge Medication List      START taking these medications    Details   acetaminophen (TYLENOL) 325 MG tablet Take 2 tablets (650 mg) by mouth every 4 hours as needed for pain or fever  Qty: 100 tablet, Refills: 0    Associated Diagnoses:  Finger osteomyelitis, right (H)      ceFAZolin (ANCEF) 1 GM vial Inject 2 g into the vein every 8 hours for 15 days CBC with differential, creatinine, SGOT weekly while on this medication to be faxed to Dr. Preciado office.  Qty: 1 each, Refills: 1    Associated Diagnoses: Cellulitis of finger of right hand      oxyCODONE (ROXICODONE) 5 MG tablet Take 0.5-1 tablets (2.5-5 mg) by mouth every 6 hours as needed for moderate to severe pain or breakthrough pain (Take 2.5 mg for pain 5-7. Take 5 mg for pain 8-10.)  Qty: 10 tablet, Refills: 0    Associated Diagnoses: Finger osteomyelitis, right (H)      senna-docusate (SENOKOT-S/PERICOLACE) 8.6-50 MG tablet Take 1-2 tablets by mouth 2 times daily as needed for constipation  Qty: 30 tablet, Refills: 0    Associated Diagnoses: Finger osteomyelitis, right (H)         CONTINUE these medications which have NOT CHANGED    Details   ASPIRIN 81 MG OR TABS Take 81 mg by mouth every evening  Qty: 100, Refills: 3      atenolol (TENORMIN) 25 MG tablet Take 1 tablet (25 mg) by mouth daily  Qty: 90 tablet, Refills: 1    Associated Diagnoses: Essential hypertension with goal blood pressure less than 140/90      buPROPion (WELLBUTRIN XL) 150 MG 24 hr tablet Take 150 mg by mouth every evening      Calcium Carb-Cholecalciferol (CALCIUM 600 + D PO) Take 1 tablet by mouth daily      citalopram (CELEXA) 40 MG tablet TAKE 1 TABLET BY MOUTH EVERY DAY  Qty: 90 tablet, Refills: 1    Associated Diagnoses: Moderate episode of recurrent major depressive disorder (H)      Cyanocobalamin (VITAMIN B 12 PO) Take 250 mcg by mouth daily    Associated Diagnoses: Excessive anger      famotidine (PEPCID) 40 MG tablet Take 1 tablet (40 mg) by mouth daily  Qty: 90 tablet, Refills: 1    Associated Diagnoses: Gastroesophageal reflux disease with esophagitis without hemorrhage      ferrous sulfate 325 (65 FE) MG tablet Take 1 tablet by mouth daily (with breakfast).      fluticasone (FLONASE) 50 MCG/ACT nasal spray  Spray 1 spray into both nostrils daily as needed for rhinitis or allergies      glipiZIDE (GLUCOTROL XL) 10 MG 24 hr tablet TAKE 2 TABLETS BY MOUTH EVERY DAY  Qty: 60 tablet, Refills: 0    Associated Diagnoses: Type 2 diabetes mellitus with diabetic neuropathy, without long-term current use of insulin (H); Hyperglycemia      ibuprofen (ADVIL/MOTRIN) 200 MG tablet Take 400 mg by mouth every 6 hours as needed for pain      latanoprost (XALATAN) 0.005 % ophthalmic solution INSTILL 1 DROP INTO BOTH EYES IN THE EVENING      LEVOXYL 137 MCG tablet TAKE 1 TABLET (137 MCG) BY MOUTH DAILY DISPENSE NAME BRAND LEVOXYL ONLY, NO GENERICS  Qty: 90 tablet, Refills: 0    Associated Diagnoses: Hypothyroidism due to acquired atrophy of thyroid      losartan (COZAAR) 100 MG tablet Take 1 tablet (100 mg) by mouth daily  Qty: 90 tablet, Refills: 1    Associated Diagnoses: Essential hypertension with goal blood pressure less than 140/90      metFORMIN (GLUCOPHAGE) 1000 MG tablet Take 1 tablet (1,000 mg) by mouth 2 times daily (with meals)  Qty: 180 tablet, Refills: 1    Associated Diagnoses: Hyperglycemia; Type 2 diabetes mellitus with diabetic neuropathy, without long-term current use of insulin (H)      MULTI-VITAMIN OR TABS Take 1 tablet by mouth daily  Qty: 30, Refills: 0      ondansetron (ZOFRAN ODT) 4 MG ODT tab Take 4 mg by mouth At Bedtime      pantoprazole (PROTONIX) 40 MG EC tablet Take 40 mg by mouth daily as needed for heartburn      VITAMIN D, CHOLECALCIFEROL, PO Take 1,000 Units by mouth daily.      B-D U/F insulin pen needle USE 4 DAILY OR AS DIRECTED.  Qty: 400 each, Refills: 0    Comments: DX Code Needed  .  Associated Diagnoses: Hyperglycemia; Type 2 diabetes mellitus with diabetic neuropathy, without long-term current use of insulin (H)      Continuous Blood Gluc  (FREESTYLE GIULIANA 14 DAY READER) SIENA 1 each continuous  Qty: 1 Device, Refills: 0    Associated Diagnoses: Type 2 diabetes mellitus with diabetic  neuropathy, with long-term current use of insulin (H)      Continuous Blood Gluc Sensor (FREESTYLE GIULIANA 14 DAY SENSOR) MISC USE 1 SENSOR EVERY 14 DAYS  Qty: 2 each, Refills: 11    Associated Diagnoses: Type 2 diabetes mellitus with diabetic neuropathy, with long-term current use of insulin (H)      finasteride (PROSCAR) 5 MG tablet Take 1 tablet (5 mg) by mouth daily  Qty: 90 tablet, Refills: 1    Associated Diagnoses: Benign prostatic hyperplasia with urinary hesitancy      insulin glargine (BASAGLAR KWIKPEN) 100 UNIT/ML pen INJECT 30 UNITS UNDER THE SKIN ONCE DAILY. INCREASE AS DIRECTED TO MAX DOSE OF 45 UNITS  Qty: 30 mL, Refills: 0    Associated Diagnoses: Hyperglycemia; Type 2 diabetes mellitus with diabetic neuropathy, without long-term current use of insulin (H)      simvastatin (ZOCOR) 20 MG tablet Take 1 tablet (20 mg) by mouth At Bedtime  Qty: 90 tablet, Refills: 1    Associated Diagnoses: Hyperlipidemia LDL goal <100         STOP taking these medications       amoxicillin-clavulanate (AUGMENTIN) 875-125 MG tablet Comments:   Reason for Stopping:             Allergies   No Known Allergies

## 2022-12-29 NOTE — PROGRESS NOTES
"SPIRITUAL HEALTH SERVICES Progress Note  [facility] Med/Surg 5    Saw pt Steve Morgan per a [facility] consult; staff requested emotional support for pt.      Patient/Family Understanding of Illness and Goals of Care - Caio mentioned that he will discharge soon to a TCU for a couple of weeks, for an infusion, before he can return home.      Distress and Loss -   john Kearney acknowledged that he has felt \"lonely\" being hospitalized over the holidays.  His spouse has visited and his daughter calls regularly.  Invited him to consider other people within his network that he could connect with and activities that he can do while in the hospital.  Caio shared that he can call his brother-in-law.  He reported that he enjoys puzzles and welcomed my offer to bring a few sudokus and word searches.  Oriented him to the CD player in his room and suggested that his spouse can bring in some of his favorite movies.  john Kearney reported that his father will soon be 91 and gave voice to his anticipatory grief that sometime in the near future he will die.  Caio shared that his father is [place] De[place] and has difficulty talking on the phone, but he is able to see his father in [place] every two weeks.  Affirmed his spending time with his father and encouraged talking to him about the \"Four Things That Matter the Most\" in their own communication style.      Strengths, Coping, and Resources - Caio named his spouse, daughter, son, and parents as being core to his support network.  He has no grandchildren.      Meaning, Beliefs, and Spirituality - Caio is Mu-ism and affiliated with [facility] but does not attend services regularly.  He welcomed prayer.      Plan of Care - Informed pt how he can request further  support.  This author and other chaplains remain available per pt/family request.    Parrish Pedersen M.Div., [facility]  Staff     [facility] routine referrals *[phone]  [facility] available 24/7 for emergent requests/referrals, either by paging " the on-call  or by entering an ASAP/STAT consult in Epic (this will also page the on-call ).

## 2022-12-29 NOTE — PLAN OF CARE
Occupational Therapy Discharge Summary    Reason for therapy discharge:    Discharged to transitional care facility.    Progress towards therapy goal(s). See goals on Care Plan in [facility] electronic health record for goal details.  Goals partially met.  Barriers to achieving goals:   discharge from facility.    Therapy recommendation(s):    Continued therapy is recommended.  Rationale/Recommendations:  Recommend continued OT at TCU to increase independence and endurance with ADLS.

## 2022-12-29 NOTE — CONSULTS
"BRIEF NUTRITION ASSESSMENT      REASON FOR ASSESSMENT:  Steve Morgan is a 62 year old male seen by Registered Dietitian for LOS.    NUTRITION HISTORY:  - Information obtained from chart.  - PMH of: DMII, neuropathy, depression.   - Admit 2/2: Osteo and necrosis of R third finger requiring I&D w/ partial amputation of R long finger on 12/23, hand deformities with neuropathy, chronic hand wound, [place].  - NKFA.    CURRENT DIET AND INTAKE:  Diet: Regular + 1500 mL fluid restriction (per MD given Na trends)     100% intakes per flowsheet review since admit and consistently ordering meals TID per review of meal system.    ANTHROPOMETRICS:  Height: 5' 5\"  Weight:  244 lbs 6.4 oz  Body mass index is 40.67 kg/m .   Weight Status: Obesity Grade III BMI >40  Weight History:  Wt Readings from Last 10 Encounters:   12/23/22 110.9 kg (244 lb 6.4 oz)   06/15/22 111.1 kg (245 lb)   11/16/21 111.1 kg (245 lb)   09/24/21 109.3 kg (241 lb)   08/09/21 112 kg (247 lb)   07/19/21 112 kg (247 lb)   07/07/21 112 kg (247 lb)   06/22/21 113.4 kg (250 lb)   05/24/21 111.7 kg (246 lb 3.2 oz)   04/08/21 112.5 kg (248 lb)   - Based on review of available wt trends as above, wt stable.    LABS:  Labs noted    MALNUTRITION:  Patient does not meet 2 malnutrition criteria.     NUTRITION INTERVENTION:  Nutrition Diagnosis:  No nutrition diagnosis at this time.    Implementation:  Nutrition Education: No education needs assessed at this time.    FOLLOW UP/MONITORING:   Will re-evaluate in 7 - 10 days, or sooner, if formally consulted.        Lorena Vaughn RDN, LD  Clinical Dietitian  3rd floor/ICU: [phone]  All other floors: [phone]  Weekend/holiday: [phone]  Office: [phone]  "

## 2022-12-29 NOTE — PROGRESS NOTES
Orthopedic Surgery  Steve Morgan  12/29/2022     Admit Date:  12/22/2022  POD: 6 Days Post-Op   Procedure(s):  Right long finger irrigation and debridement with partial amputation of the right long finger.     Alert and oriented.   Patient resting comfortably in bed.    States finger is improving     Temp:  [97.5  F (36.4  C)-98.5  F (36.9  C)] 97.5  F (36.4  C)  Pulse:  [51-64] 51  Resp:  [18-20] 20  BP: (146-148)/(64-71) 148/65  SpO2:  [93 %-96 %] 95 %    Alert and oriented.  Right hand dressing is changed at bedside.     No drainage from incision.   Erythema much improved today vs when I saw him on Tuesday.   Swelling also improving.   Radial pulse palpable.   Digits are warm.   Sensation intact to light touch    Labs:  Recent Labs   Lab Test 12/26/22  0716 12/25/22  0825 12/24/22  0713 12/23/22  0805   WBC 9.6  --  9.6 11.9*   HGB 8.5* 9.3* 8.0* 8.5*     --  308 270     No lab results found.  Recent Labs   Lab Test 12/29/22  0551 12/28/22  0537 12/27/22  0637   CRP 8.14* 26.99* 44.28*       1. PLAN:    Finger continues to improve daily.      No DVT prophylaxis from hand surgery standpoint.    Abx per ID    Non-WBing right hand. Okay to use platform walker and weight bear through forearm.    Continue current pain regiment.    Daily dressing changes and PRN dressing changes by nurse if saturation occurs.      Follow-up: 2 weeks post-op with Dr. Colby English       2. Disposition   Anticipate d/c to TCU when medically able.  Ortho stable.      Nilam Robin PA-C

## 2022-12-30 ENCOUNTER — TRANSITIONAL CARE UNIT VISIT (OUTPATIENT)
Dept: GERIATRICS | Facility: CLINIC | Age: 62
End: 2022-12-30
Payer: MEDICARE

## 2022-12-30 DIAGNOSIS — E87.1 CHRONIC HYPONATREMIA: ICD-10-CM

## 2022-12-30 DIAGNOSIS — M86.9 FINGER OSTEOMYELITIS, RIGHT (H): Primary | ICD-10-CM

## 2022-12-30 DIAGNOSIS — D64.9 ANEMIA, UNSPECIFIED TYPE: ICD-10-CM

## 2022-12-30 DIAGNOSIS — G62.9 NEUROPATHY: ICD-10-CM

## 2022-12-30 DIAGNOSIS — M65.949 TENOSYNOVITIS OF HAND: ICD-10-CM

## 2022-12-30 DIAGNOSIS — E03.9 HYPOTHYROIDISM, UNSPECIFIED TYPE: ICD-10-CM

## 2022-12-30 DIAGNOSIS — G47.33 OSA (OBSTRUCTIVE SLEEP APNEA): ICD-10-CM

## 2022-12-30 DIAGNOSIS — E66.01 MORBID OBESITY (H): ICD-10-CM

## 2022-12-30 DIAGNOSIS — I10 BENIGN ESSENTIAL HYPERTENSION: ICD-10-CM

## 2022-12-30 DIAGNOSIS — Z79.4 TYPE 2 DIABETES MELLITUS WITH DIABETIC NEUROPATHY, WITH LONG-TERM CURRENT USE OF INSULIN (H): ICD-10-CM

## 2022-12-30 DIAGNOSIS — F33.0 MILD EPISODE OF RECURRENT MAJOR DEPRESSIVE DISORDER (H): ICD-10-CM

## 2022-12-30 DIAGNOSIS — E11.40 TYPE 2 DIABETES MELLITUS WITH DIABETIC NEUROPATHY, WITH LONG-TERM CURRENT USE OF INSULIN (H): ICD-10-CM

## 2022-12-30 DIAGNOSIS — N18.30 STAGE 3 CHRONIC KIDNEY DISEASE, UNSPECIFIED WHETHER STAGE 3A OR 3B CKD (H): ICD-10-CM

## 2022-12-30 DIAGNOSIS — A49.01 METHICILLIN SUSCEPTIBLE STAPHYLOCOCCUS AUREUS INFECTION: ICD-10-CM

## 2022-12-30 DIAGNOSIS — Z71.89 ADVANCED DIRECTIVES, COUNSELING/DISCUSSION: ICD-10-CM

## 2022-12-30 DIAGNOSIS — R53.81 PHYSICAL DECONDITIONING: ICD-10-CM

## 2022-12-30 DIAGNOSIS — R05.9 COUGH, UNSPECIFIED TYPE: ICD-10-CM

## 2022-12-30 LAB
AST SERPL W P-5'-P-CCNC: 36 U/L (ref 10–50)
BACTERIA TISS BX CULT: ABNORMAL
BACTERIA TISS BX CULT: ABNORMAL
BACTERIA TISS BX CULT: NORMAL
BASOPHILS # BLD AUTO: 0.1 10E3/UL (ref 0–0.2)
BASOPHILS NFR BLD AUTO: 1 %
CREAT SERPL-MCNC: 1.36 MG/DL (ref 0.67–1.17)
EOSINOPHIL # BLD AUTO: 0.2 10E3/UL (ref 0–0.7)
EOSINOPHIL NFR BLD AUTO: 2 %
ERYTHROCYTE [DISTWIDTH] IN BLOOD BY AUTOMATED COUNT: 13.7 % (ref 10–15)
GFR SERPL CREATININE-BSD FRML MDRD: 58 ML/MIN/1.73M2
HCT VFR BLD AUTO: 28.5 % (ref 40–53)
HGB BLD-MCNC: 9.2 G/DL (ref 13.3–17.7)
IMM GRANULOCYTES # BLD: 0.2 10E3/UL
IMM GRANULOCYTES NFR BLD: 2 %
LYMPHOCYTES # BLD AUTO: 1.9 10E3/UL (ref 0.8–5.3)
LYMPHOCYTES NFR BLD AUTO: 15 %
MCH RBC QN AUTO: 28.1 PG (ref 26.5–33)
MCHC RBC AUTO-ENTMCNC: 32.3 G/DL (ref 31.5–36.5)
MCV RBC AUTO: 87 FL (ref 78–100)
MONOCYTES # BLD AUTO: 0.8 10E3/UL (ref 0–1.3)
MONOCYTES NFR BLD AUTO: 7 %
NEUTROPHILS # BLD AUTO: 9.4 10E3/UL (ref 1.6–8.3)
NEUTROPHILS NFR BLD AUTO: 73 %
NRBC # BLD AUTO: 0 10E3/UL
NRBC BLD AUTO-RTO: 0 /100
PLATELET # BLD AUTO: 471 10E3/UL (ref 150–450)
RBC # BLD AUTO: 3.27 10E6/UL (ref 4.4–5.9)
WBC # BLD AUTO: 12.7 10E3/UL (ref 4–11)

## 2022-12-30 PROCEDURE — 86481 TB AG RESPONSE T-CELL SUSP: CPT | Mod: ORL | Performed by: INTERNAL MEDICINE

## 2022-12-30 PROCEDURE — 82565 ASSAY OF CREATININE: CPT | Mod: ORL | Performed by: INTERNAL MEDICINE

## 2022-12-30 PROCEDURE — 36415 COLL VENOUS BLD VENIPUNCTURE: CPT | Performed by: INTERNAL MEDICINE

## 2022-12-30 PROCEDURE — 85025 COMPLETE CBC W/AUTO DIFF WBC: CPT | Mod: ORL | Performed by: INTERNAL MEDICINE

## 2022-12-30 PROCEDURE — 99305 1ST NF CARE MODERATE MDM 35: CPT | Performed by: INTERNAL MEDICINE

## 2022-12-30 PROCEDURE — 84450 TRANSFERASE (AST) (SGOT): CPT | Mod: ORL | Performed by: INTERNAL MEDICINE

## 2022-12-30 NOTE — PROGRESS NOTES
"[facility]  PHYSICIAN NOTE    PRIMARY CARE PROVIDER AND CLINIC:  Lisa Pierre PA-C, [address] [facility] [address]    Chief Complaint   Patient presents with     Hospital F/U     [facility] Record Number:  [id number]  Place of Service where encounter took place:  [facility] ([facility]) [[id number]]    Steve Morgan is a 62 year old (1960), admitted to the above facility from  [facility]. Hospital stay 12/22/22 through 12/29/22. Admitted to this facility for  rehab, medical management and nursing care.     HPI:    HPI information obtained from: facility chart records, facility staff, patient report and [facility] chart review.     Brief summary of hospital course: Steve Morgan is a 62yoM with diabetes who was admitted with right 3rd finger non-healing chronic wound found to have underlying osteomyelitis and possible septic tenosynovitis as well. He'd been treated in wound clinic on-going for this since 2021 after a cut. This hospitalization he underwent I&D with partial amputation and IV antibiotics (initially broad spectrum and then tailored to Cefazolin based on findings of MSSA per ID). NWB right hand but okay to use platform walker and weight bear through forearm. Daily dressing changes and PRN dressing changes by nurse if saturation occurs. F/u with Dr. English in about 2 weeks. Of note reports of some cough too that he had on admission. CXR noted \"streaky opacities are present in both lung bases, atelectasis versus developing pneumonia\". Of note he did have COVID-19 infection treated with Paxlovid back in Sept 2022. Testing this admission negative for COVID, influenza and RSV. Per ID the cough could be r/t post-COVID type cough. Discharged to TCU for rehab/cares and IV antibiotic therapy (left midline placed 12/27/22).    Updates on status since skilled nursing admission: Caio is seen in his room today and welcomes the visit. " Denies pain in the finger; did have dressing changed earlier today and new clean one in place. Has some chronic finger contractures which he states developed over time over the past few years. No neck pain. Is working with hand therapy on this but still quite stiff. Says he worked on site with therapies today and used a walker but really just needed to use R hand for a bit of light balance. Did feel that the last time his midline was flushed there was a sensation of feeling it within his L shoulder but went away shortly thereafter. Still has the cough as mentioned above quite static over the past couple months; not worsening but not improving either. Says he will often wake up coughing. Does use CPAP. Discussed that ID thought it could be a post-COVID type cough. Denies other questions/concerns.    CODE STATUS/ADVANCE DIRECTIVES DISCUSSION:   FULL code verified with him today  Patient's living condition: lives with spouse    ALLERGIES: Patient has no known allergies.    Past Medical History:   Diagnosis Date     Cellulitis and abscess of trunk 6/27/2017     Depression      Hyperlipidemia LDL goal <100 10/31/2010     Hypertension goal BP (blood pressure) < 140/90 3/17/2011     Hypothyroidism 1/12/2010     Morbid obesity due to excess calories (H) 1/12/2010     Neuropathy in diabetes (H) 1/12/2010     Obesity 1/12/2010     Sleep apnea 1/12/2010    CPAP     Type 2 diabetes, HbA1C goal < 8% (H) 3/8/2011      Past Surgical History:   Procedure Laterality Date     BIOPSY       COLONOSCOPY       EYE SURGERY       GENITOURINARY SURGERY      surg for undescended testicle     IRRIGATION AND DEBRIDEMENT HAND, COMBINED Right 12/23/2022    Procedure: Right long finger irrigation and debridement with partial amputation of the right long finger. ;  Surgeon: Colby English MD;  Location: [facility] OR     REPAIR HAMMER TOE BILATERAL  5/16/2013    Procedure: REPAIR HAMMER TOE BILATERAL;  Flexor Tenotomy Toes 2,3,4,5 Bilateral Feet;   Surgeon: Saad Bangura DPM;  Location: [facility] OR     Family History   Problem Relation Age of Onset     Breast Cancer Mother      Hypertension Mother      Thyroid Disease Mother      Depression Mother      Alzheimer Disease Mother 82     Cancer - colorectal Father      Thyroid Disease Father      Depression Father      Other - See Comments Father         bladder polyps     Heart Disease Maternal Grandmother         CHF     Circulatory Paternal Grandmother      Cancer Paternal Grandfather      Diabetes Brother      Diabetes Brother      Asthma Brother      Social History     Tobacco Use     Smoking status: Never     Smokeless tobacco: Never   Vaping Use     Vaping Use: Never used   Substance Use Topics     Alcohol use: Yes     Comment: Occassionally     Drug use: No        Post Medication Reconciliation Status:  Discharge medications reconciled and changed, see notes/orders    Current Outpatient Medications   Medication Sig Dispense Refill     acetaminophen (TYLENOL) 325 MG tablet Take 2 tablets (650 mg) by mouth every 4 hours as needed for pain or fever 100 tablet 0     ASPIRIN 81 MG OR TABS Take 81 mg by mouth every evening 100 3     atenolol (TENORMIN) 25 MG tablet Take 1 tablet (25 mg) by mouth daily 90 tablet 1     B-D U/F insulin pen needle USE 4 DAILY OR AS DIRECTED. 400 each 0     buPROPion (WELLBUTRIN XL) 150 MG 24 hr tablet Take 150 mg by mouth every evening       Calcium Carb-Cholecalciferol (CALCIUM 600 + D PO) Take 1 tablet by mouth daily       ceFAZolin (ANCEF) 1 GM vial Inject 2 g into the vein every 8 hours for 15 days CBC with differential, creatinine, SGOT weekly while on this medication to be faxed to Dr. Preciado office. 1 each 1     citalopram (CELEXA) 40 MG tablet TAKE 1 TABLET BY MOUTH EVERY DAY 90 tablet 1     Continuous Blood Gluc  (FREESTYLE GIULIANA 14 DAY READER) SIENA 1 each continuous 1 Device 0     Continuous Blood Gluc Sensor (FREESTYLE GIULIANA 14 DAY SENSOR) MISC USE 1 SENSOR EVERY  14 DAYS 2 each 11     Cyanocobalamin (VITAMIN B 12 PO) Take 250 mcg by mouth daily       famotidine (PEPCID) 40 MG tablet Take 1 tablet (40 mg) by mouth daily 90 tablet 1     ferrous sulfate 325 (65 FE) MG tablet Take 1 tablet by mouth daily (with breakfast).       finasteride (PROSCAR) 5 MG tablet Take 1 tablet (5 mg) by mouth daily 90 tablet 1     fluticasone (FLONASE) 50 MCG/ACT nasal spray Spray 1 spray into both nostrils daily as needed for rhinitis or allergies       glipiZIDE (GLUCOTROL XL) 10 MG 24 hr tablet TAKE 2 TABLETS BY MOUTH EVERY DAY 60 tablet 0     ibuprofen (ADVIL/MOTRIN) 200 MG tablet Take 400 mg by mouth every 6 hours as needed for pain       insulin glargine (BASAGLAR KWIKPEN) 100 UNIT/ML pen INJECT 30 UNITS UNDER THE SKIN ONCE DAILY. INCREASE AS DIRECTED TO MAX DOSE OF 45 UNITS 30 mL 0     latanoprost (XALATAN) 0.005 % ophthalmic solution INSTILL 1 DROP INTO BOTH EYES IN THE EVENING       LEVOXYL 137 MCG tablet TAKE 1 TABLET (137 MCG) BY MOUTH DAILY DISPENSE NAME BRAND LEVOXYL ONLY, NO GENERICS 90 tablet 0     losartan (COZAAR) 100 MG tablet Take 1 tablet (100 mg) by mouth daily 90 tablet 1     metFORMIN (GLUCOPHAGE) 1000 MG tablet Take 1 tablet (1,000 mg) by mouth 2 times daily (with meals) 180 tablet 1     MULTI-VITAMIN OR TABS Take 1 tablet by mouth daily 30 0     ondansetron (ZOFRAN ODT) 4 MG ODT tab Take 4 mg by mouth At Bedtime       oxyCODONE (ROXICODONE) 5 MG tablet Take 0.5-1 tablets (2.5-5 mg) by mouth every 6 hours as needed for moderate to severe pain or breakthrough pain (Take 2.5 mg for pain 5-7. Take 5 mg for pain 8-10.) 10 tablet 0     pantoprazole (PROTONIX) 40 MG EC tablet Take 40 mg by mouth daily as needed for heartburn       senna-docusate (SENOKOT-S/PERICOLACE) 8.6-50 MG tablet Take 1-2 tablets by mouth 2 times daily as needed for constipation 30 tablet 0     simvastatin (ZOCOR) 20 MG tablet Take 1 tablet (20 mg) by mouth At Bedtime 90 tablet 1     VITAMIN D,  "CHOLECALCIFEROL, PO Take 1,000 Units by mouth daily.         ROS:  10 point ROS of systems including Constitutional, Eyes, Respiratory, Cardiovascular, Gastroenterology, Genitourinary, Integumentary, Musculoskeletal, Psychiatric were all negative except for pertinent positives noted in my HPI.    Exam:  BP (!) 142/72   Pulse 72   Temp 98.1  F (36.7  C)   Resp 18   Ht 1.651 m (5' 5\")   Wt 110.9 kg (244 lb 6.4 oz)   SpO2 95%   BMI 40.67 kg/m    Alert, pleasant, NAD  No scleral icterus  Moist oral mucosa  Heart tones regular  Lungs clear  Abdomen obese  Trace lower extremity edema  R 3rd finger bandage clean (changed this AM)  DIP and PIP contraction and ext MCP  LUE Midline clean biodisk in place  Normal speech, no tremor  Mood euthymic    Lab/Diagnostic data:  12/22/22 xray R finger/hand:  IMPRESSION:   Marked soft tissue swelling throughout the right third finger. Severe flexion deformity and dislocation at the DIP joint, compatible with disruption of the common extensor tendon.   Erosion of the tuft of the distal phalanx is highly suspicious for osteomyelitis.   There is skin irregularity/ulceration in the tip of the finger, and there are a few small ossific fragments, which may be erosions related to osteomyelitis, or could represent an avulsion fracture related to prior tendon disruption. No erosive change,   cortical defect, or focal lucency within the middle or proximal phalanges. The PIP and MCP joints are normally aligned.   Marked soft tissue swelling is also present throughout the dorsum of the hand. No other fracture or erosion. No other joint malalignment.    Cr today stable at 1.36, AST 36, mild leukocytosis at 12.7 (was at max 15 upon admission), Hgb stable 9.2, platelets 471, MCV 87    Slightly elevated TSH with normal Free T4 this hospitalization    A1c 9.2% Sept 2022    Na within 120s throughout hospitalization and discharged at 128    ASSESSMENT/PLAN:  (M86.9) Finger osteomyelitis, right (H)  " "(primary encounter diagnosis)  (M65.9) Tenosynovitis of hand  (A49.01) Methicillin susceptible Staphylococcus aureus infection  Currently getting Cefazolin per ID recs via LUE midline  Labs today stable; follow up weekly labs per ID  To see [facility] for follow up as scheduled on 1/5/23  Daily dressing changes to R 3rd finger; currently no pain - has not used any PRN Oxycodone yet and if continues to be the case over the weekend this could likely be discontinued  NWB R hand  Do wonder about his bilateral finger contractures at DIP/PIPI and etiology and ultimately he may need to see ortho hand vs rheum? C-spine imaging?  See HPI re: some ache at L shoulder when midline was last flushed; shoulder itself looks fine - discussed monitoring for recurrence and updating us if still present    (R53.81) Physical deconditioning  (E66.01) Obesity (H)  Continue work with physical therapy and occupational therapy    (R05.9) Cough, unspecified type  He had COVID infection in Sept and estimates that he has a cough present when he wakes up since Oct  Per ID, possible post-COVID cough  Negative for COVID, influenza and RSV in the hospital  CXR in the hospital was abnormal as follows: \"IMPRESSION: Heart size is normal. Streaky opacities are present in both lung bases, atelectasis versus developing pneumonia. Follow-up advised.. Upper lobes are clear. No pneumothorax. No effusions. No pneumothorax.\"  It was thought in the hospital that the antibiotics he was on would cover for any pneumonia  Lungs clear on exam today and no coughing during our visit  Consider f/u CXR on site if persists and if abnormality, may need chest CT for further definition given prolonged cough time course  Its somewhat reassuring that the cough is not worsening    (E11.40,  Z79.4) Type 2 diabetes mellitus with diabetic neuropathy, with long-term current use of insulin (H)  (G62.9) Neuropathy  Not a lot of data points yet as just arrived at TCU, is on home regimen, " continue QID POC glucose checks and adjust as needed    (G47.33) DAMIÁN (obstructive sleep apnea)  Has CPAP on site    (F33.0) Mild episode of recurrent major depressive disorder (H)  Continue Celexa and Wellbutrin; consider ACP if desired with adjustment to TCU     (E87.1) Chronic hyponatremia  (I10) Benign essential hypertension  (N18.30) Stage 3 chronic kidney disease, unspecified whether stage 3a or 3b CKD (H)  He is on a fluid restriction and has some slight edema; monitor Na+ periodically and consider lifting these restrictions when able  However hospital notes mention some chronicity to his hyponatremia and maybe that he drinks a lot of water at home at baseline  Is also on Celexa selective serotonin reuptake inhibitor which can cause hyponatremia  Cr stable today    (D64.9) Anemia, unspecified type  Is on home B12 and iron supplement  Hgb quite low 8-9 range throughout hospitalization from previously in 11s over a year ago (?r/t acute inflammation/infection vs other)  Follow trend; would be reasonable to check iron studies and B12 here with routine labs in the next week or so as hopefully acute phase of his infection calms down  No current signs/sx of bleeding  Is on ASA and Pepcid; PPI PRN only for now  He did have colonoscopy and EGD in Oct 2021 noting: hiatal hernia, negative H.pylori, tubular adenomas with recommended f/u colonoscopy in 3 years, diverticuli and internal hemorrhoids    (E03.9) Hypothyroidism, unspecified type  Slightly elevated TSH with normal Free T4 this hospitalization  Remains on Levoxyl therapy; no adjustment needed    (Z71.89) Advanced directives, counseling/discussion  He confirms FULL CODE      Electronically signed by:  Dione Etienne DO

## 2022-12-30 NOTE — PLAN OF CARE
Physical Therapy Discharge Summary    Reason for therapy discharge:    Discharged to transitional care facility.    Progress towards therapy goal(s). See goals on Care Plan in [facility] electronic health record for goal details.  Goals partially met.  Barriers to achieving goals:   discharge from facility.    Therapy recommendation(s):    Continued therapy is recommended.  Rationale/Recommendations:  TCU reocmmended for further progression of strength and functional mobility.

## 2022-12-30 NOTE — LETTER
"    12/30/2022        RE: Steve Morgan  [id number]  [facility] [address]        [facility]  PHYSICIAN NOTE    PRIMARY CARE PROVIDER AND CLINIC:  Lisa Pierre PA-C, [id number] [facility] / [facility] [id number]    Chief Complaint   Patient presents with     Hospital F/U     [facility] Medical Record Number:  [id number]  Place of Service where encounter took place:  [facility] ([facility]) [[id number]]    Steve Morgan is a 62 year old (1960), admitted to the above facility from  [facility]. Hospital stay 12/22/22 through 12/29/22. Admitted to this facility for  rehab, medical management and nursing care.     HPI:    HPI information obtained from: facility chart records, facility staff, patient report and [facility] chart review.     Brief summary of hospital course: Steve Morgan is a 62yoM with diabetes who was admitted with right 3rd finger non-healing chronic wound found to have underlying osteomyelitis and possible septic tenosynovitis as well. He'd been treated in wound clinic on-going for this since 2021 after a cut. This hospitalization he underwent I&D with partial amputation and IV antibiotics (initially broad spectrum and then tailored to Cefazolin based on findings of MSSA per ID). NWB right hand but okay to use platform walker and weight bear through forearm. Daily dressing changes and PRN dressing changes by nurse if saturation occurs. F/u with Dr. English in about 2 weeks. Of note reports of some cough too that he had on admission. CXR noted \"streaky opacities are present in both lung bases, atelectasis versus developing pneumonia\". Of note he did have COVID-19 infection treated with Paxlovid back in Sept 2022. Testing this admission negative for COVID, influenza and RSV. Per ID the cough could be r/t post-COVID type cough. [facility]cares and IV antibiotic therapy (left midline placed 12/27/22).    Updates on status " since skilled nursing admission: Caio is seen in his room today and welcomes the visit. Denies pain in the finger; did have dressing changed earlier today and new clean one in place. Has some chronic finger contractures which he states developed over time over the past few years. No neck pain. Is working with hand therapy on this but still quite stiff. Says he worked on site with therapies today and used a walker but really just needed to use R hand for a bit of light balance. Did feel that the last time his midline was flushed there was a sensation of feeling it within his L shoulder but went away shortly thereafter. Still has the cough as mentioned above quite static over the past couple months; not worsening but not improving either. Says he will often wake up coughing. Does use CPAP. Discussed that ID thought it could be a post-COVID type cough. Denies other questions/concerns.    CODE STATUS/ADVANCE DIRECTIVES DISCUSSION:   FULL code verified with him today  Patient's living condition: lives with spouse    ALLERGIES: Patient has no known allergies.    Past Medical History:   Diagnosis Date     Cellulitis and abscess of trunk 6/27/2017     Depression      Hyperlipidemia LDL goal <100 10/31/2010     Hypertension goal BP (blood pressure) < 140/90 3/17/2011     Hypothyroidism 1/12/2010     Morbid obesity due to excess calories (H) 1/12/2010     Neuropathy in diabetes (H) 1/12/2010     Obesity 1/12/2010     Sleep apnea 1/12/2010    CPAP     Type 2 diabetes, HbA1C goal < 8% (H) 3/8/2011      Past Surgical History:   Procedure Laterality Date     BIOPSY       COLONOSCOPY       EYE SURGERY       GENITOURINARY SURGERY      surg for undescended testicle     IRRIGATION AND DEBRIDEMENT HAND, COMBINED Right 12/23/2022    Procedure: Right long finger irrigation and debridement with partial amputation of the right long finger. ;  Surgeon: Colby English MD;  Location: [facility] OR     REPAIR HAMMER TOE BILATERAL  5/16/2013     Procedure: REPAIR HAMMER TOE BILATERAL;  Flexor Tenotomy Toes 2,3,4,5 Bilateral Feet;  Surgeon: Saad Bangura DPM;  Location: [facility] OR     Family History   Problem Relation Age of Onset     Breast Cancer Mother      Hypertension Mother      Thyroid Disease Mother      Depression Mother      Alzheimer Disease Mother 82     Cancer - colorectal Father      Thyroid Disease Father      Depression Father      Other - See Comments Father         bladder polyps     Heart Disease Maternal Grandmother         CHF     Circulatory Paternal Grandmother      Cancer Paternal Grandfather      Diabetes Brother      Diabetes Brother      Asthma Brother      Social History     Tobacco Use     Smoking status: Never     Smokeless tobacco: Never   Vaping Use     Vaping Use: Never used   Substance Use Topics     Alcohol use: Yes     Comment: Occassionally     Drug use: No        Post Medication Reconciliation Status:  Discharge medications reconciled and changed, see notes/orders    Current Outpatient Medications   Medication Sig Dispense Refill     acetaminophen (TYLENOL) 325 MG tablet Take 2 tablets (650 mg) by mouth every 4 hours as needed for pain or fever 100 tablet 0     ASPIRIN 81 MG OR TABS Take 81 mg by mouth every evening 100 3     atenolol (TENORMIN) 25 MG tablet Take 1 tablet (25 mg) by mouth daily 90 tablet 1     B-D U/F insulin pen needle USE 4 DAILY OR AS DIRECTED. 400 each 0     buPROPion (WELLBUTRIN XL) 150 MG 24 hr tablet Take 150 mg by mouth every evening       Calcium Carb-Cholecalciferol (CALCIUM 600 + D PO) Take 1 tablet by mouth daily       ceFAZolin (ANCEF) 1 GM vial Inject 2 g into the vein every 8 hours for 15 days CBC with differential, creatinine, SGOT weekly while on this medication to be faxed to Dr. Preciado office. 1 each 1     citalopram (CELEXA) 40 MG tablet TAKE 1 TABLET BY MOUTH EVERY DAY 90 tablet 1     Continuous Blood Gluc  (FREESTYLE GIULIANA 14 DAY READER) SIENA 1 each continuous 1 Device 0      Continuous Blood Gluc Sensor (FREESTYLE GIULIANA 14 DAY SENSOR) MISC USE 1 SENSOR EVERY 14 DAYS 2 each 11     Cyanocobalamin (VITAMIN B 12 PO) Take 250 mcg by mouth daily       famotidine (PEPCID) 40 MG tablet Take 1 tablet (40 mg) by mouth daily 90 tablet 1     ferrous sulfate 325 (65 FE) MG tablet Take 1 tablet by mouth daily (with breakfast).       finasteride (PROSCAR) 5 MG tablet Take 1 tablet (5 mg) by mouth daily 90 tablet 1     fluticasone (FLONASE) 50 MCG/ACT nasal spray Spray 1 spray into both nostrils daily as needed for rhinitis or allergies       glipiZIDE (GLUCOTROL XL) 10 MG 24 hr tablet TAKE 2 TABLETS BY MOUTH EVERY DAY 60 tablet 0     ibuprofen (ADVIL/MOTRIN) 200 MG tablet Take 400 mg by mouth every 6 hours as needed for pain       insulin glargine (BASAGLAR KWIKPEN) 100 UNIT/ML pen INJECT 30 UNITS UNDER THE SKIN ONCE DAILY. INCREASE AS DIRECTED TO MAX DOSE OF 45 UNITS 30 mL 0     latanoprost (XALATAN) 0.005 % ophthalmic solution INSTILL 1 DROP INTO BOTH EYES IN THE EVENING       LEVOXYL 137 MCG tablet TAKE 1 TABLET (137 MCG) BY MOUTH DAILY DISPENSE NAME BRAND LEVOXYL ONLY, NO GENERICS 90 tablet 0     losartan (COZAAR) 100 MG tablet Take 1 tablet (100 mg) by mouth daily 90 tablet 1     metFORMIN (GLUCOPHAGE) 1000 MG tablet Take 1 tablet (1,000 mg) by mouth 2 times daily (with meals) 180 tablet 1     MULTI-VITAMIN OR TABS Take 1 tablet by mouth daily 30 0     ondansetron (ZOFRAN ODT) 4 MG ODT tab Take 4 mg by mouth At Bedtime       oxyCODONE (ROXICODONE) 5 MG tablet Take 0.5-1 tablets (2.5-5 mg) by mouth every 6 hours as needed for moderate to severe pain or breakthrough pain (Take 2.5 mg for pain 5-7. Take 5 mg for pain 8-10.) 10 tablet 0     pantoprazole (PROTONIX) 40 MG EC tablet Take 40 mg by mouth daily as needed for heartburn       senna-docusate (SENOKOT-S/PERICOLACE) 8.6-50 MG tablet Take 1-2 tablets by mouth 2 times daily as needed for constipation 30 tablet 0     simvastatin (ZOCOR) 20 MG  "tablet Take 1 tablet (20 mg) by mouth At Bedtime 90 tablet 1     VITAMIN D, CHOLECALCIFEROL, PO Take 1,000 Units by mouth daily.         ROS:  10 point ROS of systems including Constitutional, Eyes, Respiratory, Cardiovascular, Gastroenterology, Genitourinary, Integumentary, Musculoskeletal, Psychiatric were all negative except for pertinent positives noted in my HPI.    Exam:  BP (!) 142/72   Pulse 72   Temp 98.1  F (36.7  C)   Resp 18   Ht 1.651 m (5' 5\")   Wt 110.9 kg (244 lb 6.4 oz)   SpO2 95%   BMI 40.67 kg/m    Alert, pleasant, NAD  No scleral icterus  Moist oral mucosa  Heart tones regular  Lungs clear  Abdomen obese  Trace lower extremity edema  R 3rd finger bandage clean (changed this AM)  DIP and PIP contraction and ext MCP  LUE Midline clean biodisk in place  Normal speech, no tremor  Mood euthymic    Lab/Diagnostic data:  12/22/22 xray R finger/hand:  IMPRESSION:   Marked soft tissue swelling throughout the right third finger. Severe flexion deformity and dislocation at the DIP joint, compatible with disruption of the common extensor tendon.   Erosion of the tuft of the distal phalanx is highly suspicious for osteomyelitis.   There is skin irregularity/ulceration in the tip of the finger, and there are a few small ossific fragments, which may be erosions related to osteomyelitis, or could represent an avulsion fracture related to prior tendon disruption. No erosive change,   cortical defect, or focal lucency within the middle or proximal phalanges. The PIP and MCP joints are normally aligned.   Marked soft tissue swelling is also present throughout the dorsum of the hand. No other fracture or erosion. No other joint malalignment.    Cr today stable at 1.36, AST 36, mild leukocytosis at 12.7 (was at max 15 upon admission), Hgb stable 9.2, platelets 471, MCV 87    Slightly elevated TSH with normal Free T4 this hospitalization    A1c 9.2% Sept 2022    Na within 120s throughout hospitalization and " "discharged at 128    ASSESSMENT/PLAN:  (M86.9) Finger osteomyelitis, right (H)  (primary encounter diagnosis)  (M65.9) Tenosynovitis of hand  (A49.01) Methicillin susceptible Staphylococcus aureus infection  Currently getting Cefazolin per ID recs via LUE midline  Labs today stable; follow up weekly labs per ID  To see [facility] for follow up as scheduled on 1/5/23  Daily dressing changes to R 3rd finger; currently no pain - has not used any PRN Oxycodone yet and if continues to be the case over the weekend this could likely be discontinued  NWB R hand  Do wonder about his bilateral finger contractures at DIP/PIPI and etiology and ultimately he may need to see ortho hand vs rheum? C-spine imaging?  See HPI re: some ache at L shoulder when midline was last flushed; shoulder itself looks fine - discussed monitoring for recurrence and updating us if still present    (R53.81) Physical deconditioning  (E66.01) Obesity (H)  Continue work with physical therapy and occupational therapy    (R05.9) Cough, unspecified type  He had COVID infection in Sept and estimates that he has a cough present when he wakes up since Oct  Per ID, possible post-COVID cough  Negative for COVID, influenza and RSV in the hospital  CXR in the hospital was abnormal as follows: \"IMPRESSION: Heart size is normal. Streaky opacities are present in both lung bases, atelectasis versus developing pneumonia. Follow-up advised.. Upper lobes are clear. No pneumothorax. No effusions. No pneumothorax.\"  It was thought in the hospital that the antibiotics he was on would cover for any pneumonia  Lungs clear on exam today and no coughing during our visit  Consider f/u CXR on site if persists and if abnormality, may need chest CT for further definition given prolonged cough time course  Its somewhat reassuring that the cough is not worsening    (E11.40,  Z79.4) Type 2 diabetes mellitus with diabetic neuropathy, with long-term current use of insulin (H)  (G62.9) " Neuropathy  Not a lot of data points yet as just arrived at TCU, is on home regimen, continue QID POC glucose checks and adjust as needed    (G47.33) DAMIÁN (obstructive sleep apnea)  Has CPAP on site    (F33.0) Mild episode of recurrent major depressive disorder (H)  Continue Celexa and Wellbutrin; consider ACP if desired with adjustment to TCU     (E87.1) Chronic hyponatremia  (I10) Benign essential hypertension  (N18.30) Stage 3 chronic kidney disease, unspecified whether stage 3a or 3b CKD (H)  He is on a fluid restriction and has some slight edema; monitor Na+ periodically and consider lifting these restrictions when able  However hospital notes mention some chronicity to his hyponatremia and maybe that he drinks a lot of water at home at baseline  Is also on Celexa selective serotonin reuptake inhibitor which can cause hyponatremia  Cr stable today    (D64.9) Anemia, unspecified type  Is on home B12 and iron supplement  Hgb quite low 8-9 range throughout hospitalization from previously in 11s over a year ago (?r/t acute inflammation/infection vs other)  Follow trend; would be reasonable to check iron studies and B12 here with routine labs in the next week or so as hopefully acute phase of his infection calms down  No current signs/sx of bleeding  Is on ASA and Pepcid; PPI PRN only for now  He did have colonoscopy and EGD in Oct 2021 noting: hiatal hernia, negative H.pylori, tubular adenomas with recommended f/u colonoscopy in 3 years, diverticuli and internal hemorrhoids    (E03.9) Hypothyroidism, unspecified type  Slightly elevated TSH with normal Free T4 this hospitalization  Remains on Levoxyl therapy; no adjustment needed    (Z71.89) Advanced directives, counseling/discussion  He confirms FULL CODE      Electronically signed by:  Dione Etienne DO        Sincerely,        Dione Etienne DO

## 2023-01-01 ENCOUNTER — TELEPHONE (OUTPATIENT)
Dept: GERIATRICS | Facility: CLINIC | Age: 63
End: 2023-01-01

## 2023-01-01 PROBLEM — L02.219 CELLULITIS AND ABSCESS OF TRUNK: Status: RESOLVED | Noted: 2017-06-27 | Resolved: 2023-01-01

## 2023-01-01 PROBLEM — A41.9 ACUTE SEPSIS (H): Status: RESOLVED | Noted: 2022-12-22 | Resolved: 2023-01-01

## 2023-01-01 PROBLEM — L03.319 CELLULITIS AND ABSCESS OF TRUNK: Status: RESOLVED | Noted: 2017-06-27 | Resolved: 2023-01-01

## 2023-01-01 LAB
GAMMA INTERFERON BACKGROUND BLD IA-ACNC: 0.02 IU/ML
M TB IFN-G BLD-IMP: ABNORMAL
M TB IFN-G CD4+ BCKGRND COR BLD-ACNC: 0.46 IU/ML
MITOGEN IGNF BCKGRD COR BLD-ACNC: 0 IU/ML
MITOGEN IGNF BCKGRD COR BLD-ACNC: 0.01 IU/ML
QUANTIFERON MITOGEN: 0.48 IU/ML
QUANTIFERON NIL TUBE: 0.02 IU/ML
QUANTIFERON TB1 TUBE: 0.02 IU/ML
QUANTIFERON TB2 TUBE: 0.03

## 2023-01-01 RX ORDER — INSULIN GLARGINE 100 [IU]/ML
22 INJECTION, SOLUTION SUBCUTANEOUS AT BEDTIME
COMMUNITY
End: 2023-05-04

## 2023-01-01 RX ORDER — GLIPIZIDE 5 MG/1
5 TABLET, FILM COATED, EXTENDED RELEASE ORAL DAILY
COMMUNITY
End: 2023-01-02

## 2023-01-01 NOTE — TELEPHONE ENCOUNTER
[facility] TELEPHONE ENCOUNTER    Chief Complaint   Patient presents with     Hypoglycemia       Steve Morgan is a 62 year old  (1960),Nurse called today to report: Having more hypoglycemia episodes of <100. Currently on glipizide 10mg daily, metformin BID and glargine 30units at HS. Blood Glucose can be as drop < 65 often.      ASSESSMENT/PLAN      Reduce glargine to 20units at HS    Reduce glipizide down to 5mg daily    Continue to monitor Blood Glucose QID     Electronically signed by:   BRIDGET Sanchez CNP

## 2023-01-02 ENCOUNTER — TRANSITIONAL CARE UNIT VISIT (OUTPATIENT)
Dept: GERIATRICS | Facility: CLINIC | Age: 63
End: 2023-01-02
Payer: MEDICARE

## 2023-01-02 ENCOUNTER — TELEPHONE (OUTPATIENT)
Dept: GERIATRICS | Facility: CLINIC | Age: 63
End: 2023-01-02

## 2023-01-02 ENCOUNTER — LAB REQUISITION (OUTPATIENT)
Dept: LAB | Facility: CLINIC | Age: 63
End: 2023-01-02
Payer: MEDICARE

## 2023-01-02 VITALS
SYSTOLIC BLOOD PRESSURE: 137 MMHG | OXYGEN SATURATION: 92 % | WEIGHT: 244 LBS | RESPIRATION RATE: 18 BRPM | HEIGHT: 65 IN | BODY MASS INDEX: 40.65 KG/M2 | DIASTOLIC BLOOD PRESSURE: 77 MMHG | HEART RATE: 68 BPM | TEMPERATURE: 98 F

## 2023-01-02 DIAGNOSIS — F33.0 MILD EPISODE OF RECURRENT MAJOR DEPRESSIVE DISORDER (H): ICD-10-CM

## 2023-01-02 DIAGNOSIS — M86.9 FINGER OSTEOMYELITIS, RIGHT (H): ICD-10-CM

## 2023-01-02 DIAGNOSIS — K21.9 GASTROESOPHAGEAL REFLUX DISEASE WITHOUT ESOPHAGITIS: ICD-10-CM

## 2023-01-02 DIAGNOSIS — Z11.1 ENCOUNTER FOR SCREENING FOR RESPIRATORY TUBERCULOSIS: ICD-10-CM

## 2023-01-02 DIAGNOSIS — E11.40 TYPE 2 DIABETES MELLITUS WITH DIABETIC NEUROPATHY, WITH LONG-TERM CURRENT USE OF INSULIN (H): ICD-10-CM

## 2023-01-02 DIAGNOSIS — Z79.4 TYPE 2 DIABETES MELLITUS WITH DIABETIC NEUROPATHY, WITH LONG-TERM CURRENT USE OF INSULIN (H): ICD-10-CM

## 2023-01-02 DIAGNOSIS — D64.9 ANEMIA, UNSPECIFIED TYPE: ICD-10-CM

## 2023-01-02 DIAGNOSIS — M65.949 TENOSYNOVITIS OF HAND: ICD-10-CM

## 2023-01-02 DIAGNOSIS — N40.0 BENIGN PROSTATIC HYPERPLASIA, UNSPECIFIED WHETHER LOWER URINARY TRACT SYMPTOMS PRESENT: ICD-10-CM

## 2023-01-02 DIAGNOSIS — E87.1 HYPONATREMIA: ICD-10-CM

## 2023-01-02 DIAGNOSIS — I10 BENIGN ESSENTIAL HYPERTENSION: ICD-10-CM

## 2023-01-02 DIAGNOSIS — E03.9 HYPOTHYROIDISM, UNSPECIFIED TYPE: ICD-10-CM

## 2023-01-02 DIAGNOSIS — N18.30 STAGE 3 CHRONIC KIDNEY DISEASE, UNSPECIFIED WHETHER STAGE 3A OR 3B CKD (H): ICD-10-CM

## 2023-01-02 DIAGNOSIS — E16.2 HYPOGLYCEMIA: Primary | ICD-10-CM

## 2023-01-02 PROCEDURE — 99310 SBSQ NF CARE HIGH MDM 45: CPT | Performed by: PHYSICIAN ASSISTANT

## 2023-01-02 NOTE — PROGRESS NOTES
"  Chief Complaint   Patient presents with     RECHECK       HPI:  Steve Morgan is a 62 year old  (1960), who is being seen today for an episodic care visit at: [facility] ([facility]) [[id number]].     Brief summary: Steve Morgan is a 62yoM with diabetes who was admitted with right 3rd finger non-healing chronic wound found to have underlying osteomyelitis and possible septic tenosynovitis as well. He'd been treated in wound clinic on-going for this since 2021 after a cut. This hospitalization he underwent I&D with partial amputation and IV antibiotics (initially broad spectrum and then tailored to Cefazolin based on findings of MSSA per ID). NWB right hand but okay to use platform walker and weight bear through forearm. Daily dressing changes and PRN dressing changes by nurse if saturation occurs. F/u with Dr. English in about 2 weeks. Of note reports of some cough too that he had on admission. CXR noted \"streaky opacities are present in both lung bases, atelectasis versus developing pneumonia\". Of note he did have COVID-19 infection treated with Paxlovid back in Sept 2022. Testing this admission negative for COVID, influenza and RSV. Per ID the cough could be r/t post-COVID type cough. [facility]ares and IV antibiotic therapy (left midline placed 12/27/22).    Today's concern is: Over the weekend had some hypoglycemia. Lantus was reduced to 30--20 units daily and glimiperide was reduced 10-5 mg/d. Spoke with RN, BG still low last night and they held his Lantus so BG this am was 200.   Hospitalization MAR was reviewed and was receiving Lantus, glipizide and meformin prior to discharge    Caio is seen in his room. Is concerned regarding hypoglycemia.  Confirms we were using his home regimen. Does feel like he is eating smaller portions here. Also typically has a bedtime snack. Discussed with d/c glipizide and reduce Lantus slightly as well as offer bedtime snack. Going to Ortho " "later this week for a recheck. Denies chest pain or SOB. No dizziness.  No pain related to osteomyelitis.    Allergies, and PMH/PSH reviewed in [facility] today.    REVIEW OF SYSTEMS:  4 point ROS including Respiratory, CV, GI and , other than that noted in the HPI,  is negative    Objective:   /77   Pulse 68   Temp 98  F (36.7  C)   Resp 18   Ht 1.651 m (5' 5\")   Wt 110.7 kg (244 lb)   SpO2 92%   BMI 40.60 kg/m      Physical Exam  Constitutional:       General: He is not in acute distress.     Appearance: He is obese.   HENT:      Head: Normocephalic and atraumatic.   Eyes:      General: No scleral icterus.  Cardiovascular:      Rate and Rhythm: Normal rate and regular rhythm.      Heart sounds: No murmur heard.  Pulmonary:      Effort: Pulmonary effort is normal.      Breath sounds: No wheezing.   Musculoskeletal:      Comments: Contractures of bilateral hands.  Dressing in place.   Neurological:      General: No focal deficit present.      Mental Status: He is alert. Mental status is at baseline.   Psychiatric:         Mood and Affect: Mood normal.         Behavior: Behavior normal.          MED REC REQUIRED  Post Medication Reconciliation Status:           Recent labs in [facility] reviewed by me today.  and   Most Recent 3 CBC's:Recent Labs   Lab Test 12/30/22  0928 12/26/22  0716 12/25/22  0825 12/24/22  0713   WBC 12.7* 9.6  --  9.6   HGB 9.2* 8.5* 9.3* 8.0*   MCV 87 87  --  88   * 374  --  308     Most Recent 3 BMP's:Recent Labs   Lab Test 12/30/22  0928 12/29/22  1212 12/29/22  0916 12/29/22  0836 12/28/22  2222 12/28/22  1952 12/28/22  0804 12/28/22  0537 12/26/22  0755 12/26/22  0716 12/24/22  0724 12/24/22  0713   NA  --   --   --  128*  --  126*  --  130*   < > 129*  130*  130*   < > 128*  128*   POTASSIUM  --   --   --  4.8  --   --   --   --   --  4.7  --  4.1   CHLORIDE  --   --   --  93*  --   --   --   --   --  96*  --  94*   CO2  --   --   --  25  --   --   --   --   --  23  --  21* "   BUN  --   --   --  19.8  --   --   --   --   --  14.0  --  12.2   CR 1.36*  --   --  1.31*  --   --   --   --   --  1.31*  --  1.21*   ANIONGAP  --   --   --  10  --   --   --   --   --  11  --  13   CHAR  --   --   --  9.4  --   --   --   --   --  9.3  --  8.6*   GLC  --  218* 113* 68*   < >  --    < >  --    < > 87   < > 149*    < > = values in this interval not displayed.       Assessment/Plan:  Acute, recurrent hypoglycemia  Insulin-dependent type 2 diabetes, A1C 9.2 [Lantus 30 units daily, glipizide 10 mg XLdaily of metformin 1000 mg twice daily] recurrent hypoglycemia since admission to TCU.  Lantus reduced to 20 units and glipizide reduced to 5 mg.  Despite this ongoing hypoglycemia.  - Reduce Lantus to 18 units  - Discontinue glipizide  - Continue metformin  -We will place orders to offer bedtime snack per home routine  - Continue to monitor blood glucose and adjust as indicated     Finger osteomyelitis of the right  Tenosynovitis of the hand  MSSA infection:  - Continue with IV cefazolin per infectious disease.  Weekly labs per infectious disease  - Has follow-up with orthopedics 1/5/2023  - Continue daily wound care and dressing changes  - Pain control primarily with Tylenol.  Has not required any oxycodone      Hyponatremia: Chronic hyponatremia with baseline -130  - Continue fluid restriction, 15 mL/day  - We will obtain BMP on Friday with next scheduled labs    Hypertension: SBP is at goal  - Continue atenolol 25 mg daily and losartan 100 mg daily    Chronic kidney disease Baseline creatinine 1.2-1.4  - Monitor weekly    Anemia: Hemoglobin baseline during hospital stay 8/9  - Continue B12 and iron supplementation  - Weekly labs per infectious disease  - Follow-up with PCP    Depression  - Continue Wellbutrin and Celexa    Hypothyroidism  - Continue Synthroid    GERD  - Continue PPI    BPH  - Continue Proscar      Orders:  Reduce Lantus to 18 units  Discontinue glipizide  Offer snack at  bedtime  BMP 1/6    Electronically signed by: Maribel Nuñez PA-C

## 2023-01-02 NOTE — PROGRESS NOTES
Nurse calling to report a blood sugar of 60, was 66, patient was given orange juice and rechecked down to 60, given another glass of orange juice and now back up to 66.  Earlier this morning he was in the 50s, frequent blood sugars less than 100.  Glargine was held today, glipizide was decreased from 10 mg to 5 mg.  He is also on metformin 1000 twice daily.  Given that his glargine was held and he is still quite hypoglycemic, I have ord ered to hold glipizide, metformin and glargine until primary provider can be updated tomorrow and evaluate trends of overall blood sugar control.  Elaine Bobo, CNP

## 2023-01-02 NOTE — LETTER
"    1/2/2023        RE: Steve Morgan  [id number]  [facility] [id number]          Chief Complaint   Patient presents with     RECHECK       HPI:  Steve Morgan is a 62 year old  (1960), who is being seen today for an episodic care visit at: [facility] ([facility]) [[id number]].     Brief summary: Steve Morgan is a 62yoM with diabetes who was admitted with right 3rd finger non-healing chronic wound found to have underlying osteomyelitis and possible septic tenosynovitis as well. He'd been treated in wound clinic on-going for this since 2021 after a cut. This hospitalization he underwent I&D with partial amputation and IV antibiotics (initially broad spectrum and then tailored to Cefazolin based on findings of MSSA per ID). NWB right hand but okay to use platform walker and weight bear through forearm. Daily dressing changes and PRN dressing changes by nurse if saturation occurs. F/u with Dr. English in about 2 weeks. Of note reports of some cough too that he had on admission. CXR noted \"streaky opacities are present in both lung bases, atelectasis versus developing pneumonia\". Of note he did have COVID-19 infection treated with Paxlovid back in Sept 2022. Testing this admission negative for COVID, influenza and RSV. Per ID the cough could be r/t post-COVID type cough. [facility]charged to [facility] for rehab/cares and IV antibiotic therapy (left midline placed 12/27/22).    Today's concern is: Over the weekend had some hypoglycemia. Lantus was reduced to 30--20 units daily and glimiperide was reduced 10-5 mg/d. Spoke with RN, BG still low last night and they held his Lantus so BG this am was 200.   Hospitalization MAR was reviewed and was receiving Lantus, glipizide and meformin prior to discharge    Caio is seen in his room. Is concerned regarding hypoglycemia.  Confirms we were using his home regimen. Does feel like he is eating smaller portions here. Also typically has a bedtime snack. Discussed with " "d/c glipizide and reduce Lantus slightly as well as offer bedtime snack. Going to Ortho later this week for a recheck. Denies chest pain or SOB. No dizziness.  No pain related to osteomyelitis.    Allergies, and PMH/PSH reviewed in [facility] today.    REVIEW OF SYSTEMS:  4 point ROS including Respiratory, CV, GI and , other than that noted in the HPI,  is negative    Objective:   /77   Pulse 68   Temp 98  F (36.7  C)   Resp 18   Ht 1.651 m (5' 5\")   Wt 110.7 kg (244 lb)   SpO2 92%   BMI 40.60 kg/m      Physical Exam  Constitutional:       General: He is not in acute distress.     Appearance: He is obese.   HENT:      Head: Normocephalic and atraumatic.   Eyes:      General: No scleral icterus.  Cardiovascular:      Rate and Rhythm: Normal rate and regular rhythm.      Heart sounds: No murmur heard.  Pulmonary:      Effort: Pulmonary effort is normal.      Breath sounds: No wheezing.   Musculoskeletal:      Comments: Contractures of bilateral hands.  Dressing in place.   Neurological:      General: No focal deficit present.      Mental Status: He is alert. Mental status is at baseline.   Psychiatric:         Mood and Affect: Mood normal.         Behavior: Behavior normal.          MED REC REQUIRED  Post Medication Reconciliation Status:           Recent labs in [facility] reviewed by me today.  and   Most Recent 3 CBC's:Recent Labs   Lab Test 12/30/22  0928 12/26/22  0716 12/25/22  0825 12/24/22  0713   WBC 12.7* 9.6  --  9.6   HGB 9.2* 8.5* 9.3* 8.0*   MCV 87 87  --  88   * 374  --  308     Most Recent 3 BMP's:Recent Labs   Lab Test 12/30/22  0928 12/29/22  1212 12/29/22  0916 12/29/22  0836 12/28/22  2222 12/28/22  1952 12/28/22  0804 12/28/22  0537 12/26/22  0755 12/26/22  0716 12/24/22  0724 12/24/22  0713   NA  --   --   --  128*  --  126*  --  130*   < > 129*  130*  130*   < > 128*  128*   POTASSIUM  --   --   --  4.8  --   --   --   --   --  4.7  --  4.1   CHLORIDE  --   --   --  93*  --   --   " --   --   --  96*  --  94*   CO2  --   --   --  25  --   --   --   --   --  23  --  21*   BUN  --   --   --  19.8  --   --   --   --   --  14.0  --  12.2   CR 1.36*  --   --  1.31*  --   --   --   --   --  1.31*  --  1.21*   ANIONGAP  --   --   --  10  --   --   --   --   --  11  --  13   CHAR  --   --   --  9.4  --   --   --   --   --  9.3  --  8.6*   GLC  --  218* 113* 68*   < >  --    < >  --    < > 87   < > 149*    < > = values in this interval not displayed.       Assessment/Plan:  Acute, recurrent hypoglycemia  Insulin-dependent type 2 diabetes, A1C 9.2 [Lantus 30 units daily, glipizide 10 mg XLdaily of metformin 1000 mg twice daily] recurrent hypoglycemia since admission to TCU.  Lantus reduced to 20 units and glipizide reduced to 5 mg.  Despite this ongoing hypoglycemia.  - Reduce Lantus to 18 units  - Discontinue glipizide  - Continue metformin  -We will place orders to offer bedtime snack per home routine  - Continue to monitor blood glucose and adjust as indicated     Finger osteomyelitis of the right  Tenosynovitis of the hand  MSSA infection:  - Continue with IV cefazolin per infectious disease.  Weekly labs per infectious disease  - Has follow-up with orthopedics 1/5/2023  - Continue daily wound care and dressing changes  - Pain control primarily with Tylenol.  Has not required any oxycodone      Hyponatremia: Chronic hyponatremia with baseline -130  - Continue fluid restriction, 15 mL/day  - We will obtain BMP on Friday with next scheduled labs    Hypertension: SBP is at goal  - Continue atenolol 25 mg daily and losartan 100 mg daily    Chronic kidney disease Baseline creatinine 1.2-1.4  - Monitor weekly    Anemia: Hemoglobin baseline during hospital stay 8/9  - Continue B12 and iron supplementation  - Weekly labs per infectious disease  - Follow-up with PCP    Depression  - Continue Wellbutrin and Celexa    Hypothyroidism  - Continue Synthroid    GERD  - Continue PPI    BPH  - Continue  Proscar      Orders:  Reduce Lantus to 18 units  Discontinue glipizide  Offer snack at bedtime  BMP 1/6    Electronically signed by: Maribel Nuñez PA-C           Sincerely,        Maribel Nuñez PA-C

## 2023-01-03 ENCOUNTER — LAB REQUISITION (OUTPATIENT)
Dept: LAB | Facility: CLINIC | Age: 63
End: 2023-01-03
Payer: MEDICARE

## 2023-01-03 DIAGNOSIS — M86.9 OSTEOMYELITIS, UNSPECIFIED (H): ICD-10-CM

## 2023-01-03 DIAGNOSIS — E87.1 HYPO-OSMOLALITY AND HYPONATREMIA: ICD-10-CM

## 2023-01-03 PROBLEM — N18.30 STAGE 3 CHRONIC KIDNEY DISEASE, UNSPECIFIED WHETHER STAGE 3A OR 3B CKD (H): Status: ACTIVE | Noted: 2023-01-03

## 2023-01-03 PROCEDURE — 36415 COLL VENOUS BLD VENIPUNCTURE: CPT | Performed by: INTERNAL MEDICINE

## 2023-01-03 PROCEDURE — P9603 ONE-WAY ALLOW PRORATED MILES: HCPCS | Performed by: INTERNAL MEDICINE

## 2023-01-03 PROCEDURE — 86481 TB AG RESPONSE T-CELL SUSP: CPT | Performed by: INTERNAL MEDICINE

## 2023-01-04 DIAGNOSIS — E11.40 TYPE 2 DIABETES MELLITUS WITH DIABETIC NEUROPATHY, WITH LONG-TERM CURRENT USE OF INSULIN (H): ICD-10-CM

## 2023-01-04 DIAGNOSIS — Z79.4 TYPE 2 DIABETES MELLITUS WITH DIABETIC NEUROPATHY, WITH LONG-TERM CURRENT USE OF INSULIN (H): ICD-10-CM

## 2023-01-04 LAB
GAMMA INTERFERON BACKGROUND BLD IA-ACNC: 0.02 IU/ML
M TB IFN-G BLD-IMP: NEGATIVE
M TB IFN-G CD4+ BCKGRND COR BLD-ACNC: 0.68 IU/ML
MITOGEN IGNF BCKGRD COR BLD-ACNC: 0 IU/ML
MITOGEN IGNF BCKGRD COR BLD-ACNC: 0 IU/ML
QUANTIFERON MITOGEN: 0.7 IU/ML
QUANTIFERON NIL TUBE: 0.02 IU/ML
QUANTIFERON TB1 TUBE: 0.02 IU/ML
QUANTIFERON TB2 TUBE: 0.02

## 2023-01-04 RX ORDER — FLASH GLUCOSE SENSOR
KIT MISCELLANEOUS
Qty: 2 EACH | Refills: 11 | Status: SHIPPED | OUTPATIENT
Start: 2023-01-04 | End: 2023-01-18

## 2023-01-04 NOTE — TELEPHONE ENCOUNTER
Routing refill request to provider for review/approval because:  Drug not on the [facility] refill protocol     Daisy Bradley RN

## 2023-01-05 RX ORDER — FLASH GLUCOSE SENSOR
KIT MISCELLANEOUS
Refills: 11 | OUTPATIENT
Start: 2023-01-05

## 2023-01-05 NOTE — TELEPHONE ENCOUNTER
Pharmacy comment: Script Clarification:PLEASE SEND OVER RX FOR THE FREESTYLE READER - INSURANCE WILL ONLY PAY FOR SENSORS WHEN AN RX FOR THE READER IS BILLED FIRST.    Device [facility] Rx sent to be used with already prescribed Sensors.

## 2023-01-05 NOTE — PROGRESS NOTES
[facility] TELEPHONE ENCOUNTER  Steve Morgan is a 62 year old  (1960),Nurse called today to report: Patient received IV antibiotics and line was not working earlier today.  X-ray was obtained and it turns out that patient has midline.  Nursing is requesting order for tPA    ASSESSMENT/PLAN  Order given for tPA Cathflo 2 mg per 2 mL.  Instill into occluded catheter and let sit for 30 minutes and then attempt to aspirate blood.  If catheter function restored aspirate 5 mils of blood to run move the tPA.  Irrigate with normal saline when complete.    Electronically signed by:   BRIDGET Stanford CNP

## 2023-01-06 ENCOUNTER — TELEPHONE (OUTPATIENT)
Dept: GERIATRICS | Facility: CLINIC | Age: 63
End: 2023-01-06

## 2023-01-06 ENCOUNTER — TRANSITIONAL CARE UNIT VISIT (OUTPATIENT)
Dept: GERIATRICS | Facility: CLINIC | Age: 63
End: 2023-01-06
Payer: MEDICARE

## 2023-01-06 VITALS
TEMPERATURE: 97.8 F | HEIGHT: 65 IN | DIASTOLIC BLOOD PRESSURE: 73 MMHG | BODY MASS INDEX: 39.99 KG/M2 | SYSTOLIC BLOOD PRESSURE: 129 MMHG | RESPIRATION RATE: 18 BRPM | WEIGHT: 240 LBS | HEART RATE: 60 BPM | OXYGEN SATURATION: 97 %

## 2023-01-06 DIAGNOSIS — N18.30 STAGE 3 CHRONIC KIDNEY DISEASE, UNSPECIFIED WHETHER STAGE 3A OR 3B CKD (H): ICD-10-CM

## 2023-01-06 DIAGNOSIS — D64.9 ANEMIA, UNSPECIFIED TYPE: ICD-10-CM

## 2023-01-06 DIAGNOSIS — M65.949 TENOSYNOVITIS OF HAND: ICD-10-CM

## 2023-01-06 DIAGNOSIS — A49.01 METHICILLIN SUSCEPTIBLE STAPHYLOCOCCUS AUREUS INFECTION: ICD-10-CM

## 2023-01-06 DIAGNOSIS — Z79.4 TYPE 2 DIABETES MELLITUS WITH DIABETIC NEUROPATHY, WITH LONG-TERM CURRENT USE OF INSULIN (H): ICD-10-CM

## 2023-01-06 DIAGNOSIS — M86.9 FINGER OSTEOMYELITIS, RIGHT (H): ICD-10-CM

## 2023-01-06 DIAGNOSIS — E11.40 TYPE 2 DIABETES MELLITUS WITH DIABETIC NEUROPATHY, WITH LONG-TERM CURRENT USE OF INSULIN (H): ICD-10-CM

## 2023-01-06 DIAGNOSIS — K59.00 CONSTIPATION, UNSPECIFIED CONSTIPATION TYPE: Primary | ICD-10-CM

## 2023-01-06 DIAGNOSIS — E87.1 HYPONATREMIA: ICD-10-CM

## 2023-01-06 LAB
AST SERPL W P-5'-P-CCNC: 33 U/L (ref 10–50)
BASOPHILS # BLD AUTO: 0.1 10E3/UL (ref 0–0.2)
BASOPHILS NFR BLD AUTO: 1 %
CREAT SERPL-MCNC: 1.22 MG/DL (ref 0.67–1.17)
EOSINOPHIL # BLD AUTO: 0.1 10E3/UL (ref 0–0.7)
EOSINOPHIL NFR BLD AUTO: 2 %
ERYTHROCYTE [DISTWIDTH] IN BLOOD BY AUTOMATED COUNT: 13.7 % (ref 10–15)
GFR SERPL CREATININE-BSD FRML MDRD: 66 ML/MIN/1.73M2
HCT VFR BLD AUTO: 26.1 % (ref 40–53)
HGB BLD-MCNC: 8.4 G/DL (ref 13.3–17.7)
IMM GRANULOCYTES # BLD: 0 10E3/UL
IMM GRANULOCYTES NFR BLD: 1 %
LYMPHOCYTES # BLD AUTO: 1.4 10E3/UL (ref 0.8–5.3)
LYMPHOCYTES NFR BLD AUTO: 23 %
MCH RBC QN AUTO: 28.2 PG (ref 26.5–33)
MCHC RBC AUTO-ENTMCNC: 32.2 G/DL (ref 31.5–36.5)
MCV RBC AUTO: 88 FL (ref 78–100)
MONOCYTES # BLD AUTO: 0.6 10E3/UL (ref 0–1.3)
MONOCYTES NFR BLD AUTO: 10 %
NEUTROPHILS # BLD AUTO: 3.9 10E3/UL (ref 1.6–8.3)
NEUTROPHILS NFR BLD AUTO: 63 %
NRBC # BLD AUTO: 0 10E3/UL
NRBC BLD AUTO-RTO: 0 /100
PLATELET # BLD AUTO: 412 10E3/UL (ref 150–450)
RBC # BLD AUTO: 2.98 10E6/UL (ref 4.4–5.9)
WBC # BLD AUTO: 6.1 10E3/UL (ref 4–11)

## 2023-01-06 PROCEDURE — 85025 COMPLETE CBC W/AUTO DIFF WBC: CPT | Performed by: PHYSICIAN ASSISTANT

## 2023-01-06 PROCEDURE — 99309 SBSQ NF CARE MODERATE MDM 30: CPT | Performed by: PHYSICIAN ASSISTANT

## 2023-01-06 PROCEDURE — 80048 BASIC METABOLIC PNL TOTAL CA: CPT | Performed by: PHYSICIAN ASSISTANT

## 2023-01-06 PROCEDURE — 84450 TRANSFERASE (AST) (SGOT): CPT | Performed by: PHYSICIAN ASSISTANT

## 2023-01-06 PROCEDURE — 36415 COLL VENOUS BLD VENIPUNCTURE: CPT | Performed by: PHYSICIAN ASSISTANT

## 2023-01-06 PROCEDURE — 82565 ASSAY OF CREATININE: CPT | Performed by: PHYSICIAN ASSISTANT

## 2023-01-06 PROCEDURE — P9604 ONE-WAY ALLOW PRORATED TRIP: HCPCS | Performed by: PHYSICIAN ASSISTANT

## 2023-01-06 NOTE — LETTER
"    1/6/2023        RE: Steve Morgan  [id number]  [facility] [address]          Chief Complaint   Patient presents with     RECHECK       HPI:  Steve Morgan is a 62 year old  (1960), who is being seen today for an episodic care visit at: [facility] ([facility] [[id number]].     Brief summary: Steve Morgan is a 62yoM with diabetes who was admitted with right 3rd finger non-healing chronic wound found to have underlying osteomyelitis and possible septic tenosynovitis as well. He'd been treated in wound clinic on-going for this since 2021 after a cut. This hospitalization he underwent I&D with partial amputation and IV antibiotics (initially broad spectrum and then tailored to Cefazolin based on findings of MSSA per ID). NWB right hand but okay to use platform walker and weight bear through forearm. Daily dressing changes and PRN dressing changes by nurse if saturation occurs. F/u with Dr. English in about 2 weeks. Of note reports of some cough too that he had on admission. CXR noted \"streaky opacities are present in both lung bases, atelectasis versus developing pneumonia\". Of note he did have COVID-19 infection treated with Paxlovid back in Sept 2022. Testing this admission negative for COVID, influenza and RSV. Per ID the cough could be r/t post-COVID type cough. [facility]charged to [facility] for rehab/cares and IV antibiotic therapy (left midline placed 12/27/22).    Today's concern is:  Caio is seen for follow up. No further episodes of hypoglycemia. Had ortho follow up this week. No concerns. No longer needs to wear dressing. Denies pain. No constipation. Agrees to stop Oxycodone.Has had some constipation and will make adjustments    Allergies, and PMH/PSH reviewed in [facility] today.    REVIEW OF SYSTEMS:  4 point ROS including Respiratory, CV, GI and , other than that noted in the HPI,  is negative    Objective:   /73   Pulse 60   Temp 97.8  F (36.6  C)   Resp 18   Ht 1.651 m (5' 5\")  "  Wt 108.9 kg (240 lb)   SpO2 97%   BMI 39.94 kg/m      Physical Exam  Constitutional:       Appearance: He is obese.   HENT:      Head: Normocephalic and atraumatic.   Pulmonary:      Effort: Pulmonary effort is normal.      Breath sounds: No rales.   Musculoskeletal:      Comments: Incision to right 3rd digit well approximated. No erytema or drainage.   Skin:     General: Skin is warm and dry.      Findings: No rash.   Neurological:      General: No focal deficit present.      Mental Status: He is alert.      Cranial Nerves: No cranial nerve deficit.          MED REC REQUIRED  Post Medication Reconciliation Status: patient was not discharged from an inpatient facility or TCU      Recent labs in [facility] reviewed by me today.     Assessment/Plan:  Insulin-dependent type 2 diabetes, A1C 9.2  Hypoglycemia, resolved [Lantus 30 units daily, glipizide 10 mg XLdaily of metformin 1000 mg twice daily] recurrent hypoglycemia on admission to TCU.  Lantus reduced to 18 units and glipizide dc'd.    - -250  - Continue off glipizide  - Continue on Reduced Lantus 18 units  - Continue metformin  - Continue to monitor blood glucose and adjust as indicated     Finger osteomyelitis of the right  Tenosynovitis of the hand  MSSA infection:  - Continue with IV cefazolin per infectious disease.  Weekly labs per infectious disease. Has f/u with ID 1/12  - Had follow-up with orthopedics 1/5/2023. Healing well. Ok to leave open to air  - Continue daily wound care and dressing changes  - Pain control primarily with Tylenol.  Has not required any oxycodone, will d/c    Constipation:   - Adjust Senna to 1 tablet daily and additional prn  - Miralax daily prn      Hyponatremia: Chronic hyponatremia with baseline -130  - Continue fluid restriction, 1500 mL/day  - BMP today pending    Hypertension: SBP is at goal  - Continue atenolol 25 mg daily and losartan 100 mg daily    Chronic kidney disease Baseline creatinine 1.2-1.4  - Monitor  weekly    Anemia: Hemoglobin baseline during hospital stay 8/9  - Continue B12 and iron supplementation  - Weekly labs per infectious disease  - Follow-up with PCP    Depression  - Continue Wellbutrin and Celexa    Hypothyroidism  - Continue Synthroid    GERD  - Continue PPI    BPH  - Continue Proscar      Orders:  D/c Oxycodone  Senna S 1 tablet daily  Miralax daily prn    Electronically signed by: Maribel Nuñez PA-C           Sincerely,        Maribel Nuñez PA-C

## 2023-01-06 NOTE — PROGRESS NOTES
"  Chief Complaint   Patient presents with     RECHECK       HPI:  Steve Morgan is a 62 year old  (1960), who is being seen today for an episodic care visit at: [facility] ([facility]) [[id number]].     Brief summary: Steve Morgan is a 62yoM with diabetes who was admitted with right 3rd finger non-healing chronic wound found to have underlying osteomyelitis and possible septic tenosynovitis as well. He'd been treated in wound clinic on-going for this since 2021 after a cut. This hospitalization he underwent I&D with partial amputation and IV antibiotics (initially broad spectrum and then tailored to Cefazolin based on findings of MSSA per ID). NWB right hand but okay to use platform walker and weight bear through forearm. Daily dressing changes and PRN dressing changes by nurse if saturation occurs. F/u with Dr. English in about 2 weeks. Of note reports of some cough too that he had on admission. CXR noted \"streaky opacities are present in both lung bases, atelectasis versus developing pneumonia\". Of note he did have COVID-19 infection treated with Paxlovid back in Sept 2022. Testing this admission negative for COVID, influenza and RSV. Per ID the cough could be r/t post-COVID type cough. Discharged to [facility] for rehab/cares and IV antibiotic therapy (left midline placed 12/27/22).    Today's concern is:  Caio is seen for follow up. No further episodes of hypoglycemia. Had ortho follow up this week. No concerns. No longer needs to wear dressing. Denies pain. No constipation. Agrees to stop Oxycodone.Has had some constipation and will make adjustments    Allergies, and PMH/PSH reviewed in [facility] today.    REVIEW OF SYSTEMS:  4 point ROS including Respiratory, CV, GI and , other than that noted in the HPI,  is negative    Objective:   /73   Pulse 60   Temp 97.8  F (36.6  C)   Resp 18   Ht 1.651 m (5' 5\")   Wt 108.9 kg (240 lb)   SpO2 97%   BMI 39.94 kg/m      Physical Exam  Constitutional:  "      Appearance: He is obese.   HENT:      Head: Normocephalic and atraumatic.   Pulmonary:      Effort: Pulmonary effort is normal.      Breath sounds: No rales.   Musculoskeletal:      Comments: Incision to right 3rd digit well approximated. No erytema or drainage.   Skin:     General: Skin is warm and dry.      Findings: No rash.   Neurological:      General: No focal deficit present.      Mental Status: He is alert.      Cranial Nerves: No cranial nerve deficit.          MED REC REQUIRED  Post Medication Reconciliation Status: patient was not discharged from an inpatient facility or TCU      Recent labs in [facility] reviewed by me today.     Assessment/Plan:  Insulin-dependent type 2 diabetes, A1C 9.2  Hypoglycemia, resolved [Lantus 30 units daily, glipizide 10 mg XLdaily of metformin 1000 mg twice daily] recurrent hypoglycemia on admission to TCU.  Lantus reduced to 18 units and glipizide dc'd.    - -250  - Continue off glipizide  - Continue on Reduced Lantus 18 units  - Continue metformin  - Continue to monitor blood glucose and adjust as indicated     Finger osteomyelitis of the right  Tenosynovitis of the hand  MSSA infection:  - Continue with IV cefazolin per infectious disease.  Weekly labs per infectious disease. Has f/u with ID 1/12  - Had follow-up with orthopedics 1/5/2023. Healing well. Ok to leave open to air  - Continue daily wound care and dressing changes  - Pain control primarily with Tylenol.  Has not required any oxycodone, will d/c    Constipation:   - Adjust Senna to 1 tablet daily and additional prn  - Miralax daily prn      Hyponatremia: Chronic hyponatremia with baseline -130  - Continue fluid restriction, 1500 mL/day  - [facility] today pending    Hypertension: SBP is at goal  - Continue atenolol 25 mg daily and losartan 100 mg daily    Chronic kidney disease Baseline creatinine 1.2-1.4  - Monitor weekly    Anemia: Hemoglobin baseline during hospital stay 8/9  - Continue B12 and iron  supplementation  - Weekly labs per infectious disease  - Follow-up with PCP    Depression  - Continue Wellbutrin and Celexa    Hypothyroidism  - Continue Synthroid    GERD  - Continue PPI    BPH  - Continue Proscar      Orders:  D/c Oxycodone  Senna S 1 tablet daily  Miralax daily prn    Electronically signed by: Maribel Nuñez PA-C

## 2023-01-06 NOTE — TELEPHONE ENCOUNTER
Spoke at length with Nicole for PICC STAT.  Staff at facility was shown that the midline is patent, working and in correct placement.  Staff continue to say that it is a PICC line but in fact is a midline.  Since midline is working the orders written today by NP for peripheral line will be voided and staff will continue to use midline.  If staff have any issues with the midline they are encouraged to call PICC STAT.  NP updated with this information and agrees with the current plan of care.

## 2023-01-07 ENCOUNTER — APPOINTMENT (OUTPATIENT)
Dept: GENERAL RADIOLOGY | Facility: CLINIC | Age: 63
End: 2023-01-07
Attending: INTERNAL MEDICINE
Payer: MEDICARE

## 2023-01-07 ENCOUNTER — HOSPITAL ENCOUNTER (OUTPATIENT)
Facility: CLINIC | Age: 63
Setting detail: OBSERVATION
Discharge: HOME | End: 2023-01-09
Attending: EMERGENCY MEDICINE | Admitting: INTERNAL MEDICINE
Payer: MEDICARE

## 2023-01-07 DIAGNOSIS — E11.65 HYPERGLYCEMIA DUE TO DIABETES MELLITUS (H): ICD-10-CM

## 2023-01-07 DIAGNOSIS — E87.1 HYPONATREMIA: ICD-10-CM

## 2023-01-07 DIAGNOSIS — F32.A DEPRESSION, UNSPECIFIED DEPRESSION TYPE: ICD-10-CM

## 2023-01-07 DIAGNOSIS — L03.011 CELLULITIS OF FINGER OF RIGHT HAND: Primary | ICD-10-CM

## 2023-01-07 LAB
ALBUMIN UR-MCNC: 30 MG/DL
ANION GAP SERPL CALCULATED.3IONS-SCNC: 15 MMOL/L (ref 7–15)
ANION GAP SERPL CALCULATED.3IONS-SCNC: 9 MMOL/L (ref 7–15)
APPEARANCE UR: CLEAR
BASOPHILS # BLD AUTO: 0.1 10E3/UL (ref 0–0.2)
BASOPHILS NFR BLD AUTO: 1 %
BILIRUB UR QL STRIP: NEGATIVE
BUN SERPL-MCNC: 18.7 MG/DL (ref 8–23)
BUN SERPL-MCNC: 22.1 MG/DL (ref 8–23)
CALCIUM SERPL-MCNC: 8.6 MG/DL (ref 8.8–10.2)
CALCIUM SERPL-MCNC: 8.7 MG/DL (ref 8.8–10.2)
CHLORIDE SERPL-SCNC: 87 MMOL/L (ref 98–107)
CHLORIDE SERPL-SCNC: 88 MMOL/L (ref 98–107)
COLOR UR AUTO: ABNORMAL
CREAT SERPL-MCNC: 1.21 MG/DL (ref 0.67–1.17)
CREAT SERPL-MCNC: 1.44 MG/DL (ref 0.67–1.17)
DEPRECATED HCO3 PLAS-SCNC: 16 MMOL/L (ref 22–29)
DEPRECATED HCO3 PLAS-SCNC: 24 MMOL/L (ref 22–29)
EOSINOPHIL # BLD AUTO: 0.1 10E3/UL (ref 0–0.7)
EOSINOPHIL NFR BLD AUTO: 1 %
ERYTHROCYTE [DISTWIDTH] IN BLOOD BY AUTOMATED COUNT: 13.7 % (ref 10–15)
GFR SERPL CREATININE-BSD FRML MDRD: 54 ML/MIN/1.73M2
GFR SERPL CREATININE-BSD FRML MDRD: 67 ML/MIN/1.73M2
GLUCOSE SERPL-MCNC: 284 MG/DL (ref 70–99)
GLUCOSE SERPL-MCNC: 308 MG/DL (ref 70–99)
GLUCOSE UR STRIP-MCNC: 300 MG/DL
HBA1C MFR BLD: 9.3 %
HCT VFR BLD AUTO: 26.7 % (ref 40–53)
HGB BLD-MCNC: 8.7 G/DL (ref 13.3–17.7)
HGB UR QL STRIP: NEGATIVE
HOLD SPECIMEN: NORMAL
HOLD SPECIMEN: NORMAL
IMM GRANULOCYTES # BLD: 0.1 10E3/UL
IMM GRANULOCYTES NFR BLD: 1 %
KETONES UR STRIP-MCNC: NEGATIVE MG/DL
LEUKOCYTE ESTERASE UR QL STRIP: NEGATIVE
LYMPHOCYTES # BLD AUTO: 0.9 10E3/UL (ref 0.8–5.3)
LYMPHOCYTES NFR BLD AUTO: 8 %
MCH RBC QN AUTO: 28.3 PG (ref 26.5–33)
MCHC RBC AUTO-ENTMCNC: 32.6 G/DL (ref 31.5–36.5)
MCV RBC AUTO: 87 FL (ref 78–100)
MONOCYTES # BLD AUTO: 0.7 10E3/UL (ref 0–1.3)
MONOCYTES NFR BLD AUTO: 6 %
NEUTROPHILS # BLD AUTO: 9.2 10E3/UL (ref 1.6–8.3)
NEUTROPHILS NFR BLD AUTO: 83 %
NITRATE UR QL: NEGATIVE
NRBC # BLD AUTO: 0 10E3/UL
NRBC BLD AUTO-RTO: 0 /100
OSMOLALITY SERPL: 270 MMOL/KG (ref 280–301)
OSMOLALITY UR: 288 MMOL/KG (ref 100–1200)
PH UR STRIP: 6 [PH] (ref 5–7)
PLATELET # BLD AUTO: 398 10E3/UL (ref 150–450)
POTASSIUM SERPL-SCNC: 5.2 MMOL/L (ref 3.4–5.3)
POTASSIUM SERPL-SCNC: 5.4 MMOL/L (ref 3.4–5.3)
RBC # BLD AUTO: 3.07 10E6/UL (ref 4.4–5.9)
RBC URINE: 1 /HPF
SODIUM SERPL-SCNC: 119 MMOL/L (ref 136–145)
SODIUM SERPL-SCNC: 120 MMOL/L (ref 136–145)
SODIUM UR-SCNC: 48 MMOL/L
SP GR UR STRIP: 1.01 (ref 1–1.03)
UROBILINOGEN UR STRIP-MCNC: NORMAL MG/DL
WBC # BLD AUTO: 11 10E3/UL (ref 4–11)
WBC URINE: 1 /HPF

## 2023-01-07 PROCEDURE — G0378 HOSPITAL OBSERVATION PER HR: HCPCS

## 2023-01-07 PROCEDURE — 99222 1ST HOSP IP/OBS MODERATE 55: CPT | Mod: AI | Performed by: INTERNAL MEDICINE

## 2023-01-07 PROCEDURE — 36415 COLL VENOUS BLD VENIPUNCTURE: CPT | Performed by: EMERGENCY MEDICINE

## 2023-01-07 PROCEDURE — 85025 COMPLETE CBC W/AUTO DIFF WBC: CPT | Performed by: EMERGENCY MEDICINE

## 2023-01-07 PROCEDURE — 250N000011 HC RX IP 250 OP 636: Performed by: INTERNAL MEDICINE

## 2023-01-07 PROCEDURE — 83935 ASSAY OF URINE OSMOLALITY: CPT | Performed by: EMERGENCY MEDICINE

## 2023-01-07 PROCEDURE — 80048 BASIC METABOLIC PNL TOTAL CA: CPT | Performed by: EMERGENCY MEDICINE

## 2023-01-07 PROCEDURE — 96374 THER/PROPH/DIAG INJ IV PUSH: CPT

## 2023-01-07 PROCEDURE — 84300 ASSAY OF URINE SODIUM: CPT | Performed by: EMERGENCY MEDICINE

## 2023-01-07 PROCEDURE — 93005 ELECTROCARDIOGRAM TRACING: CPT

## 2023-01-07 PROCEDURE — 71046 X-RAY EXAM CHEST 2 VIEWS: CPT

## 2023-01-07 PROCEDURE — 83930 ASSAY OF BLOOD OSMOLALITY: CPT | Performed by: EMERGENCY MEDICINE

## 2023-01-07 PROCEDURE — 81001 URINALYSIS AUTO W/SCOPE: CPT | Performed by: EMERGENCY MEDICINE

## 2023-01-07 PROCEDURE — 250N000013 HC RX MED GY IP 250 OP 250 PS 637: Performed by: INTERNAL MEDICINE

## 2023-01-07 PROCEDURE — 99285 EMERGENCY DEPT VISIT HI MDM: CPT | Mod: 25

## 2023-01-07 PROCEDURE — 83036 HEMOGLOBIN GLYCOSYLATED A1C: CPT | Performed by: INTERNAL MEDICINE

## 2023-01-07 RX ORDER — BUPROPION HCL 100 MG
25 TABLET ORAL 3 TIMES DAILY
Status: DISCONTINUED | OUTPATIENT
Start: 2023-01-07 | End: 2023-01-09 | Stop reason: HOSPADM

## 2023-01-07 RX ORDER — FINASTERIDE 5 MG/1
5 TABLET, FILM COATED ORAL EVERY EVENING
Status: DISCONTINUED | OUTPATIENT
Start: 2023-01-07 | End: 2023-01-09 | Stop reason: HOSPADM

## 2023-01-07 RX ORDER — LOSARTAN POTASSIUM 100 MG/1
100 TABLET ORAL DAILY
Status: DISCONTINUED | OUTPATIENT
Start: 2023-01-08 | End: 2023-01-09 | Stop reason: HOSPADM

## 2023-01-07 RX ORDER — NICOTINE POLACRILEX 4 MG
15-30 LOZENGE BUCCAL
Status: DISCONTINUED | OUTPATIENT
Start: 2023-01-07 | End: 2023-01-09 | Stop reason: HOSPADM

## 2023-01-07 RX ORDER — IBUPROFEN 400 MG/1
400 TABLET, FILM COATED ORAL EVERY 6 HOURS PRN
Status: DISCONTINUED | OUTPATIENT
Start: 2023-01-07 | End: 2023-01-09 | Stop reason: HOSPADM

## 2023-01-07 RX ORDER — PROCHLORPERAZINE MALEATE 5 MG
10 TABLET ORAL EVERY 6 HOURS PRN
Status: DISCONTINUED | OUTPATIENT
Start: 2023-01-07 | End: 2023-01-09 | Stop reason: HOSPADM

## 2023-01-07 RX ORDER — ONDANSETRON 4 MG/1
4 TABLET, ORALLY DISINTEGRATING ORAL AT BEDTIME
Status: DISCONTINUED | OUTPATIENT
Start: 2023-01-07 | End: 2023-01-09 | Stop reason: HOSPADM

## 2023-01-07 RX ORDER — PROCHLORPERAZINE 25 MG
25 SUPPOSITORY, RECTAL RECTAL EVERY 12 HOURS PRN
Status: DISCONTINUED | OUTPATIENT
Start: 2023-01-07 | End: 2023-01-09 | Stop reason: HOSPADM

## 2023-01-07 RX ORDER — LATANOPROST 50 UG/ML
1 SOLUTION/ DROPS OPHTHALMIC AT BEDTIME
Status: DISCONTINUED | OUTPATIENT
Start: 2023-01-07 | End: 2023-01-09 | Stop reason: HOSPADM

## 2023-01-07 RX ORDER — CITALOPRAM HYDROBROMIDE 20 MG/1
20 TABLET ORAL DAILY
Status: DISCONTINUED | OUTPATIENT
Start: 2023-01-08 | End: 2023-01-09 | Stop reason: HOSPADM

## 2023-01-07 RX ORDER — AMOXICILLIN 250 MG
1-2 CAPSULE ORAL 2 TIMES DAILY
Status: DISCONTINUED | OUTPATIENT
Start: 2023-01-07 | End: 2023-01-09 | Stop reason: HOSPADM

## 2023-01-07 RX ORDER — CEFAZOLIN SODIUM 1 G/3ML
2 INJECTION, POWDER, FOR SOLUTION INTRAMUSCULAR; INTRAVENOUS EVERY 8 HOURS
Status: DISCONTINUED | OUTPATIENT
Start: 2023-01-07 | End: 2023-01-07

## 2023-01-07 RX ORDER — DEXTROSE MONOHYDRATE 25 G/50ML
25-50 INJECTION, SOLUTION INTRAVENOUS
Status: DISCONTINUED | OUTPATIENT
Start: 2023-01-07 | End: 2023-01-09 | Stop reason: HOSPADM

## 2023-01-07 RX ORDER — CEFAZOLIN SODIUM 2 G/100ML
2 INJECTION, SOLUTION INTRAVENOUS EVERY 8 HOURS
Status: DISCONTINUED | OUTPATIENT
Start: 2023-01-07 | End: 2023-01-09 | Stop reason: HOSPADM

## 2023-01-07 RX ORDER — ASPIRIN 81 MG/1
81 TABLET ORAL EVERY EVENING
Status: DISCONTINUED | OUTPATIENT
Start: 2023-01-07 | End: 2023-01-09 | Stop reason: HOSPADM

## 2023-01-07 RX ORDER — ACETAMINOPHEN 325 MG/1
650 TABLET ORAL EVERY 4 HOURS PRN
Status: DISCONTINUED | OUTPATIENT
Start: 2023-01-07 | End: 2023-01-09 | Stop reason: HOSPADM

## 2023-01-07 RX ORDER — ATENOLOL 25 MG/1
25 TABLET ORAL DAILY
Status: DISCONTINUED | OUTPATIENT
Start: 2023-01-08 | End: 2023-01-09 | Stop reason: HOSPADM

## 2023-01-07 RX ORDER — SIMVASTATIN 20 MG
20 TABLET ORAL AT BEDTIME
Status: DISCONTINUED | OUTPATIENT
Start: 2023-01-07 | End: 2023-01-09 | Stop reason: HOSPADM

## 2023-01-07 RX ORDER — PANTOPRAZOLE SODIUM 40 MG/1
40 TABLET, DELAYED RELEASE ORAL DAILY PRN
Status: DISCONTINUED | OUTPATIENT
Start: 2023-01-07 | End: 2023-01-09 | Stop reason: HOSPADM

## 2023-01-07 RX ORDER — ONDANSETRON 4 MG/1
4 TABLET, ORALLY DISINTEGRATING ORAL EVERY 6 HOURS PRN
Status: DISCONTINUED | OUTPATIENT
Start: 2023-01-07 | End: 2023-01-09 | Stop reason: HOSPADM

## 2023-01-07 RX ORDER — ONDANSETRON 2 MG/ML
4 INJECTION INTRAMUSCULAR; INTRAVENOUS EVERY 6 HOURS PRN
Status: DISCONTINUED | OUTPATIENT
Start: 2023-01-07 | End: 2023-01-09 | Stop reason: HOSPADM

## 2023-01-07 RX ORDER — BISACODYL 10 MG
10 SUPPOSITORY, RECTAL RECTAL DAILY PRN
Status: DISCONTINUED | OUTPATIENT
Start: 2023-01-07 | End: 2023-01-09 | Stop reason: HOSPADM

## 2023-01-07 RX ORDER — FAMOTIDINE 20 MG/1
40 TABLET, FILM COATED ORAL DAILY
Status: DISCONTINUED | OUTPATIENT
Start: 2023-01-08 | End: 2023-01-09 | Stop reason: HOSPADM

## 2023-01-07 RX ORDER — POLYETHYLENE GLYCOL 3350 17 G/17G
17 POWDER, FOR SOLUTION ORAL DAILY PRN
Qty: 510 G
Start: 2023-01-07 | End: 2023-06-01

## 2023-01-07 RX ADMIN — BUPROPION HYDROCHLORIDE 25 MG: 100 TABLET, FILM COATED ORAL at 22:47

## 2023-01-07 RX ADMIN — CEFAZOLIN SODIUM 2 G: 2 INJECTION, SOLUTION INTRAVENOUS at 21:30

## 2023-01-07 RX ADMIN — SIMVASTATIN 20 MG: 20 TABLET, FILM COATED ORAL at 21:35

## 2023-01-07 RX ADMIN — ASPIRIN 81 MG: 81 TABLET, COATED ORAL at 21:36

## 2023-01-07 RX ADMIN — LATANOPROST 1 DROP: 50 SOLUTION/ DROPS OPHTHALMIC at 22:23

## 2023-01-07 RX ADMIN — METFORMIN HYDROCHLORIDE 1000 MG: 500 TABLET ORAL at 22:22

## 2023-01-07 RX ADMIN — ONDANSETRON 4 MG: 4 TABLET, ORALLY DISINTEGRATING ORAL at 21:35

## 2023-01-07 ASSESSMENT — ENCOUNTER SYMPTOMS
CONSTIPATION: 1
COUGH: 0
DIARRHEA: 0

## 2023-01-07 ASSESSMENT — ACTIVITIES OF DAILY LIVING (ADL)
ADLS_ACUITY_SCORE: 35

## 2023-01-07 NOTE — ED TRIAGE NOTES
Pt arrives via EMS from TCU, PT at TCU due to recent surgery on right hand. PT had labs drawn yesterday and they had pt come in due to Na being 119 per paperwork. PT has no complaints at this time. PT VSS and ABC's intact

## 2023-01-07 NOTE — ED PROVIDER NOTES
History     Chief Complaint:  Abnormal Labs     The history is provided by the patient and medical records.      Steve Morgan is a 62 year old right handed male who presents with abnormal labs. Caio presents from [facility] where he is recovering from right middle finger partial amputation related to osteomyelitis on 1 g Ancef every 8 hours. He had labs yesterday which revealed hyponatremia to 119, as below, prompting his ED visit. He recalls having an issue with hyponatremia in the past and being told to restrict himself to 1500 mL of fluids. He believes he may routinely drink more than this. He has constipation but denies any other symptoms of concern including cough.    Independent Historian: Yes, as above    Review of External Notes: I reviewed labs from 1/6/2023 as below. Also reviewed his discharge summary from last month.    WBC Count 6.1    RBC Count 2.98 Low     Hemoglobin 8.4 Low     Hematocrit 26.1 Low     MCV 88    MCH 28.2    MCHC 32.2    RDW 13.7    Platelet Count 412    Creatinine  1.2 high    GFR Estimate   66      Sodium 119 Low Panic     Potassium 5.2    Chloride 88 Low     Carbon Dioxide (CO2) 16 Low     Anion Gap 15    Urea Nitrogen 22.1    Creatinine 1.21 High     Calcium 8.7 Low     Glucose 284 High       ROS:  Review of Systems   Respiratory: Negative for cough.    Gastrointestinal: Positive for constipation. Negative for diarrhea.   Skin: Positive for wound (surgical).   All other systems reviewed and are negative.    Allergies:  The patient has no known allergies     Medications:    Aspirin   Tenormin   Wellbutrin   Ancef   Celexa   Pepcid   Proscar   Flonase   Lantus   Levoxyl   Cozaar  Glucophage   Zofran   Protonix   Senokot   Zocor     Past Medical History:    Cellulitis and abscess of trunk   Depression   Hyperlipidemia    Hypertension   Hypothyroidism   Obesity   Neuropathy in diabetes   DAMIÁN  Type 2 diabetes   Anemia     Past Surgical History:    Eye surgery    surgery for undescended testicle   I & D hand   Repair hammer toe bilateral      Family History:    family history includes Alzheimer Disease (age of onset: 82) in his mother; Asthma in his brother; Breast Cancer in his mother; Cancer in his paternal grandfather; Cancer - colorectal in his father; Circulatory in his paternal grandmother; Depression in his father and mother; Diabetes in his brother and brother; Heart Disease in his maternal grandmother; Hypertension in his mother; Other - See Comments in his father; Thyroid Disease in his father and mother.    Social History:  Patient came from [facility].  Patient is unaccompanied in the ED.    Physical Exam     Patient Vitals for the past 24 hrs:   BP Temp Temp src Pulse Resp SpO2 Weight   01/07/23 2216 -- 98.2  F (36.8  C) Oral -- 18 -- --   01/07/23 1753 -- -- -- 59 -- -- --   01/07/23 1743 -- -- -- 58 -- -- --   01/07/23 1733 -- -- -- 57 -- -- --   01/07/23 1723 -- -- -- 67 -- -- --   01/07/23 1713 -- -- -- 56 -- -- --   01/07/23 1703 -- -- -- 59 -- -- --   01/07/23 1653 -- -- -- 57 -- -- --   01/07/23 1643 -- -- -- 58 -- -- --   01/07/23 1517 -- -- -- 59 -- -- --   01/07/23 1502 -- -- -- 58 -- 98 % --   01/07/23 1447 (!) 146/79 -- -- -- -- 99 % --   01/07/23 1444 (!) 146/79 98.2  F (36.8  C) Temporal 66 18 98 % 108 kg (238 lb)       Physical Exam  General: Well-developed and well-nourished. Well appearing middle aged  man. Cooperative.  Head:  Atraumatic.  Eyes:  Conjunctivae, lids, and sclerae are normal.  Neck:  Supple. Normal range of motion.  CV:  Regular rate and rhythm. Normal heart sounds with no murmurs, rubs, or gallops detected.  Resp:  No respiratory distress. Clear to auscultation bilaterally without decreased breath sounds, wheezing, rales, or rhonchi.  GI:  Soft. Non-distended. Non-tender.    MS:  Normal ROM. No bilateral lower extremity edema. LUE PICC.  Skin:  Warm. Non-diaphoretic. No pallor. Healing erythema and edema of the  right third digit with surgical wound at amputation site without purulence.  Neuro:  Awake. A&Ox3. Normal strength.  Psych: Normal mood and affect. Normal speech.  Vitals reviewed.    Emergency Department Course   EKG  Indication: abnormal labs  Time: 1602  Rate 59 bpm. CO interval 190. QRS duration 88. QT/QTc 404/399.   Sinus bradycardia  Minimal voltage criteria for LVH, may be normal variant  Borderline ECG    No acute ST changes.  No significant changes as compared to prior, dated 12/22/22.    Laboratory:  Labs Ordered and Resulted from Time of ED Arrival to Time of ED Departure   BASIC METABOLIC PANEL - Abnormal       Result Value    Sodium 120 (*)     Potassium 5.4 (*)     Chloride 87 (*)     Carbon Dioxide (CO2) 24      Anion Gap 9      Urea Nitrogen 18.7      Creatinine 1.44 (*)     Calcium 8.6 (*)     Glucose 308 (*)     GFR Estimate 54 (*)    CBC WITH PLATELETS AND DIFFERENTIAL - Abnormal    WBC Count 11.0      RBC Count 3.07 (*)     Hemoglobin 8.7 (*)     Hematocrit 26.7 (*)     MCV 87      MCH 28.3      MCHC 32.6      RDW 13.7      Platelet Count 398      % Neutrophils 83      % Lymphocytes 8      % Monocytes 6      % Eosinophils 1      % Basophils 1      % Immature Granulocytes 1      NRBCs per 100 WBC 0      Absolute Neutrophils 9.2 (*)     Absolute Lymphocytes 0.9      Absolute Monocytes 0.7      Absolute Eosinophils 0.1      Absolute Basophils 0.1      Absolute Immature Granulocytes 0.1      Absolute NRBCs 0.0     ROUTINE UA WITH MICROSCOPIC REFLEX TO CULTURE - Abnormal    Color Urine Light Yellow      Appearance Urine Clear      Glucose Urine 300 (*)     Bilirubin Urine Negative      Ketones Urine Negative      Specific Gravity Urine 1.009      Blood Urine Negative      pH Urine 6.0      Protein Albumin Urine 30 (*)     Urobilinogen Urine Normal      Nitrite Urine Negative      Leukocyte Esterase Urine Negative      RBC Urine 1      WBC Urine 1     OSMOLALITY - Abnormal    Osmolality Blood 270 (*)     OSMOLALITY, RANDOM URINE - Normal    Osmolality Urine 288     SODIUM RANDOM URINE    Sodium Urine mmol/L 48        Emergency Department Course & Assessments:  Consultations/Discussion of Management or Tests:     ED Course as of 01/07/23 2232   Sat Jan 07, 2023   1536 I met with the patient and obtained history    1746 I rechecked the patient and explained findings and plan.   1824 I consulted with Dr. Palacios about the patient and plan of care     Social Determinants of Health affecting care:  Currently residing in TCU.    Disposition:  The patient was admitted to the hospital under the care of Dr. Palacios.     Impression & Plan    Medical Decision Making:  Caio is a 62 year old man currently in TCU after partial finger amputation for osteomyelitis currently receiving Ancef presenting with hyponatremia to 119.  He has some constipation but no other significant symptoms and appears well on exam with the receding erythema and edema of the affected digit.    I repeated labs here and he is indeed hyponatremic to 120.  When corrected for hyperglycemia his sodium is still 123.  I reviewed his most recent hospitalization where he had chronic hyponatremia though it was most often in the 127 or 128 range.  Today's EKG does not show arrhythmia or interval changes.  He does not have kidney injury (creatinine range appears to be 1.4-1.5 but was 1.3 at discharge) or leukocytosis and anemia is at baseline.  Urinalysis is without UTI.  I sent serum osmolality as well as urine osmolality and sodium to aid in investigating the source of his acute on chronic hyponatremia.  However, because this is the lowest his sodium has been he warrants observation admission for further monitoring until it gets back to his baseline.  He does understand findings and plan for admission and I answered all his questions.  He is amenable.  I discussed the patient's case with Dr. Palacios, hospitalist, who accepts admission and has no further  orders.    Diagnosis:    ICD-10-CM    1. Hyponatremia  E87.1       2. Hyperglycemia due to diabetes mellitus (H)  E11.65            Scribe Disclosure:  I, Hunter Monzon, am serving as a scribe at 3:36 PM on 1/7/2023 to document services personally performed by Theresa Estes MD based on my observations and the provider's statements to me.     1/7/2023   Theresa Estes MD Dixson, Kylie S, MD  01/09/23 1404

## 2023-01-08 LAB
ANION GAP SERPL CALCULATED.3IONS-SCNC: 9 MMOL/L (ref 7–15)
BUN SERPL-MCNC: 16.4 MG/DL (ref 8–23)
CALCIUM SERPL-MCNC: 8.7 MG/DL (ref 8.8–10.2)
CHLORIDE SERPL-SCNC: 92 MMOL/L (ref 98–107)
CHOLEST SERPL-MCNC: 114 MG/DL
CORTIS SERPL-MCNC: 12.1 UG/DL
CREAT SERPL-MCNC: 1.16 MG/DL (ref 0.67–1.17)
DEPRECATED HCO3 PLAS-SCNC: 24 MMOL/L (ref 22–29)
ERYTHROCYTE [DISTWIDTH] IN BLOOD BY AUTOMATED COUNT: 13.5 % (ref 10–15)
FASTING STATUS PATIENT QL REPORTED: ABNORMAL
GFR SERPL CREATININE-BSD FRML MDRD: 70 ML/MIN/1.73M2
GLUCOSE BLDC GLUCOMTR-MCNC: 147 MG/DL (ref 70–99)
GLUCOSE BLDC GLUCOMTR-MCNC: 155 MG/DL (ref 70–99)
GLUCOSE BLDC GLUCOMTR-MCNC: 166 MG/DL (ref 70–99)
GLUCOSE BLDC GLUCOMTR-MCNC: 176 MG/DL (ref 70–99)
GLUCOSE BLDC GLUCOMTR-MCNC: 193 MG/DL (ref 70–99)
GLUCOSE BLDC GLUCOMTR-MCNC: 206 MG/DL (ref 70–99)
GLUCOSE SERPL-MCNC: 169 MG/DL (ref 70–99)
GLUCOSE SERPL-MCNC: 215 MG/DL (ref 70–99)
HCT VFR BLD AUTO: 25.4 % (ref 40–53)
HDLC SERPL-MCNC: 30 MG/DL
HGB BLD-MCNC: 8.3 G/DL (ref 13.3–17.7)
LDLC SERPL CALC-MCNC: 64 MG/DL
MCH RBC QN AUTO: 28.2 PG (ref 26.5–33)
MCHC RBC AUTO-ENTMCNC: 32.7 G/DL (ref 31.5–36.5)
MCV RBC AUTO: 86 FL (ref 78–100)
NONHDLC SERPL-MCNC: 84 MG/DL
PLATELET # BLD AUTO: 357 10E3/UL (ref 150–450)
POTASSIUM SERPL-SCNC: 5 MMOL/L (ref 3.4–5.3)
PROT SERPL-MCNC: 6.5 G/DL (ref 6.4–8.3)
RBC # BLD AUTO: 2.94 10E6/UL (ref 4.4–5.9)
SODIUM SERPL-SCNC: 122 MMOL/L (ref 136–145)
SODIUM SERPL-SCNC: 123 MMOL/L (ref 136–145)
SODIUM SERPL-SCNC: 125 MMOL/L (ref 136–145)
TRIGL SERPL-MCNC: 99 MG/DL
WBC # BLD AUTO: 7 10E3/UL (ref 4–11)

## 2023-01-08 PROCEDURE — 94660 CPAP INITIATION&MGMT: CPT

## 2023-01-08 PROCEDURE — 82962 GLUCOSE BLOOD TEST: CPT

## 2023-01-08 PROCEDURE — G0378 HOSPITAL OBSERVATION PER HR: HCPCS

## 2023-01-08 PROCEDURE — 250N000013 HC RX MED GY IP 250 OP 250 PS 637: Performed by: INTERNAL MEDICINE

## 2023-01-08 PROCEDURE — 96372 THER/PROPH/DIAG INJ SC/IM: CPT | Performed by: INTERNAL MEDICINE

## 2023-01-08 PROCEDURE — 36415 COLL VENOUS BLD VENIPUNCTURE: CPT | Performed by: INTERNAL MEDICINE

## 2023-01-08 PROCEDURE — 96376 TX/PRO/DX INJ SAME DRUG ADON: CPT

## 2023-01-08 PROCEDURE — 84295 ASSAY OF SERUM SODIUM: CPT | Performed by: INTERNAL MEDICINE

## 2023-01-08 PROCEDURE — 82947 ASSAY GLUCOSE BLOOD QUANT: CPT | Mod: 91 | Performed by: INTERNAL MEDICINE

## 2023-01-08 PROCEDURE — 84155 ASSAY OF PROTEIN SERUM: CPT | Performed by: INTERNAL MEDICINE

## 2023-01-08 PROCEDURE — 99207 PR NO BILLABLE SERVICE THIS VISIT: CPT | Performed by: INTERNAL MEDICINE

## 2023-01-08 PROCEDURE — 80061 LIPID PANEL: CPT | Performed by: INTERNAL MEDICINE

## 2023-01-08 PROCEDURE — 250N000012 HC RX MED GY IP 250 OP 636 PS 637: Performed by: INTERNAL MEDICINE

## 2023-01-08 PROCEDURE — 85027 COMPLETE CBC AUTOMATED: CPT | Performed by: INTERNAL MEDICINE

## 2023-01-08 PROCEDURE — 84295 ASSAY OF SERUM SODIUM: CPT | Mod: 91 | Performed by: INTERNAL MEDICINE

## 2023-01-08 PROCEDURE — 82533 TOTAL CORTISOL: CPT | Performed by: INTERNAL MEDICINE

## 2023-01-08 PROCEDURE — 250N000011 HC RX IP 250 OP 636: Performed by: INTERNAL MEDICINE

## 2023-01-08 PROCEDURE — 99232 SBSQ HOSP IP/OBS MODERATE 35: CPT | Performed by: INTERNAL MEDICINE

## 2023-01-08 PROCEDURE — 999N000157 HC STATISTIC RCP TIME EA 10 MIN

## 2023-01-08 PROCEDURE — 82947 ASSAY GLUCOSE BLOOD QUANT: CPT | Performed by: INTERNAL MEDICINE

## 2023-01-08 PROCEDURE — 999N000040 HC STATISTIC CONSULT NO CHARGE VASC ACCESS

## 2023-01-08 RX ORDER — BUPROPION HYDROCHLORIDE 100 MG/1
25 TABLET ORAL 3 TIMES DAILY
DISCHARGE
Start: 2023-01-08 | End: 2023-01-12

## 2023-01-08 RX ORDER — LIDOCAINE 40 MG/G
CREAM TOPICAL
Status: DISCONTINUED | OUTPATIENT
Start: 2023-01-08 | End: 2023-01-09 | Stop reason: HOSPADM

## 2023-01-08 RX ORDER — CITALOPRAM HYDROBROMIDE 20 MG/1
20 TABLET ORAL DAILY
DISCHARGE
Start: 2023-01-09 | End: 2023-01-12

## 2023-01-08 RX ORDER — OXYCODONE HYDROCHLORIDE 5 MG/1
2.5-5 TABLET ORAL EVERY 6 HOURS PRN
Qty: 10 TABLET | Refills: 0 | Status: SHIPPED | OUTPATIENT
Start: 2023-01-08 | End: 2023-01-12

## 2023-01-08 RX ADMIN — BUPROPION HYDROCHLORIDE 25 MG: 100 TABLET, FILM COATED ORAL at 08:59

## 2023-01-08 RX ADMIN — LATANOPROST 1 DROP: 50 SOLUTION/ DROPS OPHTHALMIC at 22:43

## 2023-01-08 RX ADMIN — FINASTERIDE 5 MG: 5 TABLET, FILM COATED ORAL at 22:18

## 2023-01-08 RX ADMIN — BUPROPION HYDROCHLORIDE 25 MG: 100 TABLET, FILM COATED ORAL at 22:19

## 2023-01-08 RX ADMIN — ACETAMINOPHEN 650 MG: 325 TABLET, FILM COATED ORAL at 20:24

## 2023-01-08 RX ADMIN — INSULIN GLARGINE 20 UNITS: 100 INJECTION, SOLUTION SUBCUTANEOUS at 22:44

## 2023-01-08 RX ADMIN — CEFAZOLIN SODIUM 2 G: 2 INJECTION, SOLUTION INTRAVENOUS at 05:22

## 2023-01-08 RX ADMIN — CEFAZOLIN SODIUM 2 G: 2 INJECTION, SOLUTION INTRAVENOUS at 13:18

## 2023-01-08 RX ADMIN — ASPIRIN 81 MG: 81 TABLET, COATED ORAL at 20:24

## 2023-01-08 RX ADMIN — METFORMIN HYDROCHLORIDE 1000 MG: 500 TABLET ORAL at 17:31

## 2023-01-08 RX ADMIN — CITALOPRAM HYDROBROMIDE 20 MG: 20 TABLET ORAL at 08:54

## 2023-01-08 RX ADMIN — FAMOTIDINE 40 MG: 20 TABLET ORAL at 08:54

## 2023-01-08 RX ADMIN — ONDANSETRON 4 MG: 4 TABLET, ORALLY DISINTEGRATING ORAL at 22:17

## 2023-01-08 RX ADMIN — LEVOTHYROXINE SODIUM 137 MCG: 112 TABLET ORAL at 08:53

## 2023-01-08 RX ADMIN — FINASTERIDE 5 MG: 5 TABLET, FILM COATED ORAL at 00:36

## 2023-01-08 RX ADMIN — METFORMIN HYDROCHLORIDE 1000 MG: 500 TABLET ORAL at 08:54

## 2023-01-08 RX ADMIN — CEFAZOLIN SODIUM 2 G: 2 INJECTION, SOLUTION INTRAVENOUS at 20:26

## 2023-01-08 RX ADMIN — SENNOSIDES AND DOCUSATE SODIUM 1 TABLET: 50; 8.6 TABLET ORAL at 08:59

## 2023-01-08 RX ADMIN — LOSARTAN POTASSIUM 100 MG: 100 TABLET, FILM COATED ORAL at 08:54

## 2023-01-08 RX ADMIN — ATENOLOL 25 MG: 25 TABLET ORAL at 08:53

## 2023-01-08 RX ADMIN — SIMVASTATIN 20 MG: 20 TABLET, FILM COATED ORAL at 22:18

## 2023-01-08 RX ADMIN — INSULIN ASPART 1 UNITS: 100 INJECTION, SOLUTION INTRAVENOUS; SUBCUTANEOUS at 00:03

## 2023-01-08 ASSESSMENT — ACTIVITIES OF DAILY LIVING (ADL)
ADLS_ACUITY_SCORE: 21
ADLS_ACUITY_SCORE: 35

## 2023-01-08 NOTE — PLAN OF CARE
PRIMARY DIAGNOSIS: HYPONATREMIA  OUTPATIENT/OBSERVATION GOALS TO BE MET BEFORE DISCHARGE:  1. ADLs back to baseline: Yes    2. Activity and level of assistance: Up with standby assistance.    3. Pain status: Pain free.    4. Return to near baseline physical activity: Yes     Discharge Planner Nurse   Safe discharge environment identified: Yes  Barriers to discharge: Yes       Entered by: Haylie Monaco RN 01/08/2023    Pt.alert &oriented x 4.Denies pain. SBA with  Walker.Midline to left upper arm & CDI. PIV right forearm saline locked.On  ABX Ancef q8hrs. BGs AC/HS.On 1500ml fluid restriction /24hrs.Daily wt.On contact isolation for MRSA.Will continue monitoring.  Please review provider order for any additional goals.   Nurse to notify provider when observation goals have been met and patient is ready for discharge.Goal Outcome Evaluation:

## 2023-01-08 NOTE — PROGRESS NOTES
Pt arrived room ED30, stating he is frustrated he has not eaten since lunch and is diabetic. Pt given a snack pack.

## 2023-01-08 NOTE — PLAN OF CARE
ROOM # 207    Living Situation (if not independent, order SW consult):  Facility name:[facility]  : Sri (Wife)    Activity level at baseline: Independent  Activity level on admit: SBA    Who will be transporting you at discharge: Wife    Patient registered to observation; given Patient Bill of Rights; given the opportunity to ask questions about observation status and their plan of care.  Patient has been oriented to the observation room, bathroom and call light is in place.    Discussed discharge goals and expectations with patient/family.         Goal Outcome Evaluation:

## 2023-01-08 NOTE — PROGRESS NOTES
[facility]    Hospitalist Progress Note  Name: Steve Morgan    MRN: [id number]  Provider:  Magan Lyon DO  Date of Service: 01/08/2023    Summary of Stay: Steve Morgan is a 62 year old male with a history of chronic hyponatremia, depression, type 2 diabetes mellitus with neuropathy, hypertension, dyslipidemia, obesity, hypothyroidism admitted on 1/7/2023 from his TCU due to sodium level of 119 and glucose of 284.  Of note, the patient was recently admitted from 12/22/2022 to 12/29/2022 for the treatment of right third finger MSSA osteomyelitis with associated cellulitis.  The patient was discharged on IV Ancef.  The patient was also treated at that time for hyponatremia with fluid restriction.  In the emergency department, the patient was found to have a temperature of 98.2  F, blood pressure 146/79, heart rate 59, respiratory rate 18, SPO2 98% on room air.  Initial lab work showed hemoglobin 8.7, sodium 120, potassium 5.4, chloride 87, BUN/creatinine 18.7/1.44, calcium 8.6, glucose 308.  Urine sodium returned at 48, it serum osmolality 270, urine osmolality 288.  Lab work was suggestive of SIADH.  The patient was placed on fluid restriction.  The patient's antidepressants were decreased in dose with the intention of tapering off over the next 2 to 3 weeks due to concern they may be contributing to his hyponatremia.  The patient also admitted to drinking 4 L of water daily.  It was recommended he decrease this as it is likely contributing to his hyponatremia.  Tapering of the patient's antidepressants was discussed at length with the patient who is agreeable.  It was recommended to follow-up with his psychiatrist or primary care doctor in 2 to 3 weeks regarding his depression and medication regimen.      TODAY'S PLAN:  Continue fluid restriction.  Discussed with pt plan to taper antidepressants.  Trending sodium back, pt has had hyponatremia as long ago as 2011.  We also discussed  decreasing the amount of water pt drinks at home.  He expressed concern about cutting back on water intake and it causing worsening of his constipation.  Recommended starting a daily regimen of stool softener and laxative (colace + miralax) or even metamucil.  Pt expressed understanding.  If sodium continues to improve this afternoon and AM lab work (cortisol, etc.) negative, will plan for discharge this afternoon or tomorrow.  Pt came from TCU so suspect he will go back there.  ADDENDUM:  Repeat sodium down to 123 from 125.  Will hold discharge today.  Consult Nephrology and appreciate recommendations.  Anticipate discharge to TCU tomorrow pending Nephro recommendations.    Problem List:   Acute on Chronic Hyponatremia  - serum osmolality 270, urine sodium 48, and urine osmolality 288 -> consistent with SIADH  - Repeat CXR negative  - Baseline Sodium appears to be upper 120s-low 130s  - Fluid restriction 1500 mL  - AM cortisol 12.1.  Recommend outpatient ACTH stimulation test with his Endocrinologist  - Triglyceride level 99  - Plan to taper celexa and bupropion discussed with pt.  Plan to cut dose every week until tapered off.  We discussed that if his depression increases, he could consider cymbalta.  We also discussed that if he tapers off the medications and his hyponatremia continues, then he could consider restarting celexa and bupropion as they are likely not the cause    Recent Right Third Finger MSSA with Sepsis (admitted 12/22-12/29/2022)  - Continue IV ancef with midline in place    Uncontrolled Type 2 Diabetes Mellitus with Hyperglycemia  - A1C = 9.3 on 1/7/2023  - Continue PTA lantus 20 units at bedtime and metformin 1000 mg BID  - ISS  - Discussed with pt that his uncontrolled diabetes may put him at risk for slowing bowel function and constipation and gastroparesis    Hypertension  Hyperlipidemia  - Continue PTA ASA, atenolol, losartan, zocor    Hypothyroidism  - Continue PTA levothyroxine    Chronic  Medical Problems:  Depression  Obesity - BMI 39.6  DAMIÁN    DVT Prophylaxis: Pneumatic Compression Devices  Code Status: Full Code  Diet: Moderate Consistent Carb (60 g CHO per Meal) Diet  Fluid restriction 1500 ML FLUID    [place] Catheter: Not present  Disposition: Expected discharge later today vs tomorrow to TCU. Goals prior to discharge include sodium improved, TCU bed available.   Family updated today: No     Interval History   Pt seen and examined.  Pt denies cp, sob.    -Data reviewed today: I personally reviewed all new labs and imaging results over the last 24 hours.     Physical Exam   Temp: 97.7  F (36.5  C) Temp src: Oral BP: (!) 155/78 Pulse: 62   Resp: 16 SpO2: 99 % O2 Device: None (Room air)    Vitals:    01/07/23 1444 01/08/23 0047   Weight: 108 kg (238 lb) 108.5 kg (239 lb 3.2 oz)     Vital Signs with Ranges  Temp:  [97.7  F (36.5  C)-98.2  F (36.8  C)] 97.7  F (36.5  C)  Pulse:  [56-67] 62  Resp:  [16-18] 16  BP: (139-170)/(78-84) 155/78  SpO2:  [98 %-100 %] 99 %  I/O last 3 completed shifts:  In: 360 [P.O.:360]  Out: -     GENERAL: No apparent distress. Awake, alert, and fully oriented.  HEENT: Normocephalic, atraumatic. Extraocular movements intact.  CARDIOVASCULAR: Regular rate and rhythm without murmurs or rubs. No S3.  PULMONARY: Clear bilaterally.  GASTROINTESTINAL: Soft, non-tender, non-distended. Bowel sounds normoactive.   EXTREMITIES: No cyanosis or clubbing. No edema.  NEUROLOGICAL: CN 2-12 grossly intact, no focal neurological deficits.  DERMATOLOGICAL: No rash, ulcer, bruising, nor jaundice.    Medications       aspirin  81 mg Oral QPM     atenolol  25 mg Oral Daily     buPROPion  25 mg Oral TID     ceFAZolin  2 g Intravenous Q8H     citalopram  20 mg Oral Daily     famotidine  40 mg Oral Daily     finasteride  5 mg Oral QPM     insulin aspart  1-7 Units Subcutaneous TID AC     insulin aspart  1-5 Units Subcutaneous At Bedtime     insulin glargine  20 Units Subcutaneous At Bedtime      latanoprost  1 drop Both Eyes At Bedtime     levothyroxine  137 mcg Oral Daily     losartan  100 mg Oral Daily     metFORMIN  1,000 mg Oral BID w/meals     ondansetron  4 mg Oral At Bedtime     senna-docusate  1-2 tablet Oral BID     simvastatin  20 mg Oral At Bedtime     Data     Laboratory:  Recent Labs   Lab 01/08/23  0549 01/07/23  1452 01/06/23  1410   WBC 7.0 11.0 6.1   HGB 8.3* 8.7* 8.4*   HCT 25.4* 26.7* 26.1*   MCV 86 87 88    398 412     Recent Labs   Lab 01/08/23  1120 01/08/23  0744 01/08/23  0549 01/08/23  0213 01/08/23  0002 01/07/23  1452 01/06/23  1410   NA  --   --  125*  --  122* 120* 119*   POTASSIUM  --   --  5.0  --   --  5.4* 5.2   CHLORIDE  --   --  92*  --   --  87* 88*   CO2  --   --  24  --   --  24 16*   ANIONGAP  --   --  9  --   --  9 15   * 147* 169*   < > 206*  215* 308* 284*   BUN  --   --  16.4  --   --  18.7 22.1   CR  --   --  1.16  --   --  1.44* 1.21*  1.22*   GFRESTIMATED  --   --  70  --   --  54* 67  66   CHAR  --   --  8.7*  --   --  8.6* 8.7*    < > = values in this interval not displayed.     No results for input(s): CULT in the last 168 hours.    Imaging:  Recent Results (from the past 24 hour(s))   XR Chest 2 Views    Narrative    EXAM: XR CHEST 2 VIEWS  LOCATION: [facility]  DATE/TIME: 1/7/2023 10:39 PM    INDICATION: follow up bibasilar infiltrates  COMPARISON: None.      Impression    IMPRESSION: Negative chest.         Magan Lyon DO  [facility] Hospitalist  [address] [place] [address]  01/08/2023

## 2023-01-08 NOTE — PLAN OF CARE
"PRIMARY DIAGNOSIS: HYPONATREMIA    OUTPATIENT/OBSERVATION GOALS TO BE MET BEFORE DISCHARGE:    ADLs back to baseline: Yes     Activity and level of assistance: Up with standby assistance.     Pain status: Pain free. Right Middle finger, red, warm, denies pain. ROMAN     Return to near baseline physical activity: Yes          Discharge Planner Nurse   Safe discharge environment identified: Yes  Barriers to discharge: Yes       Entered by: Cara Pittman RN 01/08/2023      . A/Ox4, OOB SBA. Lives with wife, arrives from [facility].     BP (!) 155/78 (BP Location: Right arm)   Pulse 62   Temp 97.7  F (36.5  C) (Oral)   Resp 16   Ht 1.651 m (5' 5\")   Wt 108.5 kg (239 lb 3.2 oz)   SpO2 99%   BMI 39.80 kg/m      Please review provider order for any additional goals.   Nurse to notify provider when observation goals have been met and patient is ready for discharge.  "

## 2023-01-08 NOTE — PHARMACY-ADMISSION MEDICATION HISTORY
Admission medication history interview status for this patient is complete. See [facility] admission navigator for allergy information, prior to admission medications and immunization status.     Medication history interview done, indicate source(s): Patient  Medication history resources (including written lists, pill bottles, clinic record):None  Pharmacy: [facility]    Changes made to PTA medication list:  Added: none  Changed: none  Reported as Not Taking: none  Removed: none    Actions taken by pharmacist (provider contacted, etc):None     Additional medication history information: I reviewed Chart Notes and discharge records from 12/29/2022.    Medication reconciliation/reorder completed by provider prior to medication history?  Y    Prior to Admission medications    Medication Sig Last Dose Taking? Auth Provider Long Term End Date   acetaminophen (TYLENOL) 325 MG tablet Take 2 tablets (650 mg) by mouth every 4 hours as needed for pain or fever Past Month Yes Cara Pedraza PA     ASPIRIN 81 MG OR TABS Take 81 mg by mouth every evening 1/6/2023 at PM Yes Saúl Pope MD     atenolol (TENORMIN) 25 MG tablet Take 1 tablet (25 mg) by mouth daily 1/7/2023 at 1000 Yes Lisa Pierre PA-C Yes    buPROPion (WELLBUTRIN XL) 150 MG 24 hr tablet Take 150 mg by mouth every evening 1/6/2023 at PM Yes Unknown, Entered By History No    Calcium Carb-Cholecalciferol (CALCIUM 600 + D PO) Take 1 tablet by mouth daily 1/7/2023 at 1000 Yes Reported, Patient     ceFAZolin (ANCEF) 1 GM vial Inject 2 g into the vein every 8 hours for 15 days CBC with differential, creatinine, SGOT weekly while on this medication to be faxed to Dr. Preciado office. 1/7/2023 at AM Yes Hebert Preciado MD  1/12/23   citalopram (CELEXA) 40 MG tablet TAKE 1 TABLET BY MOUTH EVERY DAY 1/7/2023 at 1000 Yes Lisa Pierre PA-C Yes    Continuous Blood Gluc  (FREESTYLE GIULIANA 2 READER) SIENA 1 Device continuous  Yes Ignacio  Lisa GUERRERO PA-C     Continuous Blood Gluc Sensor (FREESTYLE GIULIANA 14 DAY SENSOR) MISC USE 1 SENSOR EVERY 14 DAYS not on upon admit Yes Lisa Pierre PA-C     Cyanocobalamin (VITAMIN B 12 PO) Take 250 mcg by mouth daily 1/7/2023 at 1000 Yes Reported, Patient     famotidine (PEPCID) 40 MG tablet Take 1 tablet (40 mg) by mouth daily 1/7/2023 Yes Lisa Pierre PA-C     ferrous sulfate 325 (65 FE) MG tablet Take 1 tablet by mouth daily (with breakfast). 1/7/2023 at 1000 Yes Mariangel Fernando PA-C Yes    finasteride (PROSCAR) 5 MG tablet Take 1 tablet (5 mg) by mouth daily 1/6/2023 at PM Yes Lisa Pierre PA-C     fluticasone (FLONASE) 50 MCG/ACT nasal spray Spray 1 spray into both nostrils daily as needed for rhinitis or allergies Unknown Yes Unknown, Entered By History     ibuprofen (ADVIL/MOTRIN) 200 MG tablet Take 400 mg by mouth every 6 hours as needed for pain Unknown Yes Unknown, Entered By History     insulin glargine (LANTUS VIAL) 100 UNIT/ML vial Inject 18 Units Subcutaneous At Bedtime 1/6/2023 at PM Yes Reported, Patient No    latanoprost (XALATAN) 0.005 % ophthalmic solution INSTILL 1 DROP INTO BOTH EYES IN THE EVENING 1/6/2023 at PM Yes Reported, Patient Yes    LEVOXYL 137 MCG tablet TAKE 1 TABLET (137 MCG) BY MOUTH DAILY DISPENSE NAME BRAND LEVOXYL ONLY, NO GENERICS 1/7/2023 at 0900 Yes Cindi Meraz MD Yes    losartan (COZAAR) 100 MG tablet Take 1 tablet (100 mg) by mouth daily 1/7/2023 at 1000 Yes Lisa Pierre PA-C Yes    metFORMIN (GLUCOPHAGE) 1000 MG tablet Take 1 tablet (1,000 mg) by mouth 2 times daily (with meals) 1/7/2023 at 1000 Yes Lisa Pierre PA-C Yes    MULTI-VITAMIN OR TABS Take 1 tablet by mouth daily 1/7/2023 at 1000 Yes Saúl Pope MD     ondansetron (ZOFRAN ODT) 4 MG ODT tab Take 4 mg by mouth At Bedtime 1/6/2023 at PM Yes Unknown, Entered By History     pantoprazole (PROTONIX) 40 MG EC tablet Take 40 mg by mouth daily as needed for  heartburn Past Month Yes Unknown, Entered By History     senna-docusate (SENOKOT-S/PERICOLACE) 8.6-50 MG tablet Take 1-2 tablets by mouth 2 times daily as needed for constipation 1/7/2023 at 1000 Yes Cara Pedraza PA     simvastatin (ZOCOR) 20 MG tablet Take 1 tablet (20 mg) by mouth At Bedtime 1/6/2023 at PM Yes Lisa Pierre PA-C Yes    VITAMIN D, CHOLECALCIFEROL, PO Take 1,000 Units by mouth daily. 1/7/2023 at 1000 Yes Reported, Patient Yes    polyethylene glycol (MIRALAX) 17 GM/Dose powder Take 17 g by mouth daily as needed for constipation   Maribel Nuñez PA-C

## 2023-01-08 NOTE — PLAN OF CARE
Pt brought to ED30, settled in. Was upset as he did not get dinner, gave him a snack pack. Pt is A/Ox4, VSS, he is diabetic, Abnormal labs - will continue to monitor and provide cares. Waiting on room. Picc/midline dressing changed due to peeling off and dirty. Waiting for meds to be acknowledged by pharmacy.    Due to hyponatremia, pt on fluid restriction.

## 2023-01-08 NOTE — CONSULTS
Care Management Discharge Note    Discharge Date: 01/08/2023       Discharge Disposition:  [facility] TCU    Discharge Services:  none    Discharge Transportation:  TBD, family          Handoff Referral Completed: Yes    Additional Information:  CM consulted for discharge planning. Per chart review, patient arrives from [facility] Transitional Care Unit. Patient plan of care reviewed in am Care Rounds, MD feels patient could return to Transitional Care Unit today. Na 125 this am with a recheck at 1300. If Na level stabilized patient can return. Call placed to [facility] and left message for Transitional Care Unit nurse to call CM back regarding patient plan of care. Call back received from Thomas Colon (cell [phone])Transitional Care Unit nurse [phone]. He states patient's bed is being held and he could return this afternoon. Explained need for 1300 Na level and MD then will determine discharge readiness. Transitional Care Unit is fine with afternoon return. MD discharge orders should be faxed to them at fax: [phone]. Will update Transitional Care Unit with final plan this afternoon. MD and bedside RN updated. Anticipate family to transport to Transitional Care Unit.     Will cont to follow and finalize plan this afternoon.    ADDENDUM 1330:  1300 Na level 123, per bedside RN patient will need to stay overnight. Call placed to [facility] and updated Thomas YEPEZ in Transitional Care Unit that patient will not be discharging back today. Updated that patient will likely discharge back tomorrow if am labs are ok. They are holding patient's bed. CM to follow up with admissions tomorrow for discharge planning.               Kristin Balderrama RN BSN CM  Inpatient Care Coordination  [facility]  [phone]

## 2023-01-08 NOTE — CARE PLAN
"PRIMARY DIAGNOSIS: HYPONATREMIA     OUTPATIENT/OBSERVATION GOALS TO BE MET BEFORE DISCHARGE:     1. ADLs back to baseline: Yes     2. Activity and level of assistance: Up with standby assistance.     3. Pain status: Pain free. Right Middle finger, red, warm, denies pain. ROMAN     4. Return to near baseline physical activity: Yes          Discharge Planner Nurse   Safe discharge environment identified: Yes  Barriers to discharge: Yes       Entered by: Cara Pittman RN 01/08/2023      . A/Ox4, OOB SBA. Lives with wife, arrives from [facility].      BP (!) 157/73 (BP Location: Right arm)   Pulse 57   Temp 98.5  F (36.9  C) (Oral)   Resp 16   Ht 1.651 m (5' 5\")   Wt 108.5 kg (239 lb 3.2 oz)   SpO2 98%   BMI 39.80 kg/m      Please review provider order for any additional goals.   Nurse to notify provider when observation goals have been met and patient is ready for discharge.          "

## 2023-01-08 NOTE — PLAN OF CARE
"PRIMARY DIAGNOSIS: HYPONATREMIA     OUTPATIENT/OBSERVATION GOALS TO BE MET BEFORE DISCHARGE:     ADLs back to baseline: Yes     Activity and level of assistance: Up with standby assistance.     Pain status: Pain free. Right Middle finger, red, warm, denies pain. ROMAN     Return to near baseline physical activity: Yes          Discharge Planner Nurse   Safe discharge environment identified: Yes  Barriers to discharge: Yes       Entered by: Cara Pittman RN 01/08/2023      Recheck . Staying overnight, follow Na levels. A/Ox4, OOB SBA. Lives with wife, arrives from [facility].      BP (!) 152/78 (BP Location: Right arm)   Pulse 58   Temp 97.9  F (36.6  C) (Oral)   Resp 16   Ht 1.651 m (5' 5\")   Wt 108.5 kg (239 lb 3.2 oz)   SpO2 98%   BMI 39.80 kg/m       Please review provider order for any additional goals.   Nurse to notify provider when observation goals have been met and patient is ready for discharge.  "

## 2023-01-08 NOTE — ED NOTES
[facility]  ED Nurse Handoff Report    Steve Morgan is a 62 year old male   ED Chief complaint: Abnormal Labs  . ED Diagnosis:   Final diagnoses:   None     Allergies: No Known Allergies    Code Status: Full Code  Activity level - Baseline/Home:  Independent. Activity Level - Current:   Stand by Assist. Lift room needed: No. Bariatric: No   Needed: No   Isolation: Yes. Infection: Not Applicable  MRSA.     Vital Signs:   Vitals:    01/07/23 1723 01/07/23 1733 01/07/23 1743 01/07/23 1753   BP:       Pulse: 67 57 58 59   Resp:       Temp:       TempSrc:       SpO2:       Weight:           Cardiac Rhythm:  ,      Pain level:    Patient confused: No. Patient Falls Risk: Yes.   Elimination Status: Has voided   Patient Report - Initial Complaint: Abnormal labs . Focused Assessment:  Steve Morgan is a 62 year old right handed male who presents with abnormal labs. The patient presents from [facility] due to recent surgery on his right hand. He is sent to the ED today after staff members noticed he was having low sodium levels, he affirms that he has had problems in the past and is on fluid restrictions of 1500 mL that he believes he regularly goes over. The patient is on 1g of Ancef every 8 hours. He also mentions having some constipation this morning. He denies any new cough or diarrhea.  Tests Performed: EKG,Labs . Abnormal Results:   Labs Ordered and Resulted from Time of ED Arrival to Time of ED Departure   BASIC METABOLIC PANEL - Abnormal       Result Value    Sodium 120 (*)     Potassium 5.4 (*)     Chloride 87 (*)     Carbon Dioxide (CO2) 24      Anion Gap 9      Urea Nitrogen 18.7      Creatinine 1.44 (*)     Calcium 8.6 (*)     Glucose 308 (*)     GFR Estimate 54 (*)    CBC WITH PLATELETS AND DIFFERENTIAL - Abnormal    WBC Count 11.0      RBC Count 3.07 (*)     Hemoglobin 8.7 (*)     Hematocrit 26.7 (*)     MCV 87      MCH 28.3      MCHC 32.6      RDW 13.7      Platelet  Count 398      % Neutrophils 83      % Lymphocytes 8      % Monocytes 6      % Eosinophils 1      % Basophils 1      % Immature Granulocytes 1      NRBCs per 100 WBC 0      Absolute Neutrophils 9.2 (*)     Absolute Lymphocytes 0.9      Absolute Monocytes 0.7      Absolute Eosinophils 0.1      Absolute Basophils 0.1      Absolute Immature Granulocytes 0.1      Absolute NRBCs 0.0     ROUTINE UA WITH MICROSCOPIC REFLEX TO CULTURE - Abnormal    Color Urine Light Yellow      Appearance Urine Clear      Glucose Urine 300 (*)     Bilirubin Urine Negative      Ketones Urine Negative      Specific Gravity Urine 1.009      Blood Urine Negative      pH Urine 6.0      Protein Albumin Urine 30 (*)     Urobilinogen Urine Normal      Nitrite Urine Negative      Leukocyte Esterase Urine Negative      RBC Urine 1      WBC Urine 1     SODIUM RANDOM URINE    Sodium Urine mmol/L 48     OSMOLALITY, RANDOM URINE   OSMOLALITY     .   Treatments provided: See MAR   Family Comments: N/A   OBS brochure/video discussed/provided to patient:  N/A  ED Medications: Medications - No data to display  Drips infusing:  No  For the majority of the shift, the patient's behavior Green. Interventions performed were N/A.    Sepsis treatment initiated: No     Patient tested for COVID 19 prior to admission: YES    ED Nurse Name/Phone Number: Amee Schroeder RN,   6:36 PM   RECEIVING UNIT ED HANDOFF REVIEW    Above ED Nurse Handoff Report was reviewed: Yes  Reviewed by: Haylie Monaco RN on January 8, 2023 at 12:33 AM

## 2023-01-08 NOTE — H&P
[facility]    History and Physical - Hospitalist Service       Date of Admission:  1/7/2023    Assessment & Plan      Steve Morgan is a 62 year old male admitted on 1/7/2023 with hyponatremia. He has history of type 2 diabetes, peripheral neuropathy, hypertension, dyslipidemia, obesity (BMI of 39.6), obstructive sleep apnea, depression, hypothyroidism, and cellulitis.  He had recent admission here from 12/22/2022 through 12/29/2022 with right third finger MSSA osteomyelitis, associated cellulitis, sepsis, acute kidney injury, and hyponatremia.  Hyponatremia was thought to be due to excessive water intake.  It improved with fluid restriction.  He had irrigation and debridement in the operating room on 12/23/2022 with amputation of distal right third finger.  He was followed by infectious disease.  He discharged to transitional care with plans for 15 more days of Ancef.  He had labs drawn yesterday which showed sodium of 119 and glucose of 284.  He was sent to the emergency department today for evaluation and treatment.  He is feeling fine without confusion.  He has had nothing that sounds like a seizure.    Emergency department evaluation showed stable vital signs.  Laboratory evaluation showed sodium 120 (corrects to 123-125 for glucose depending on formula use), chloride 87, potassium 5.4, bicarb 24, BUN 18.7, creatinine 1.44, glucose 308, white blood cells 11, hemoglobin 8.7, serum osmolality 270, urine sodium 48, and urine osmolality 288.  Review of labs from recent admission showed similar hyponatremia labs (on 12/24/2022 sodium was 126, serum osmolality 284, urine osmolality 350, and urine sodium 61.  TSH at that time was 5.23 and T4 1.14).  I was asked to admit Steve to the hospital for further cares.  Review of Steve's medications shows no thiazide diuretic.  He is on citalopram and bupropion.      Problem list:    Hyponatremia, moderate and recurrent  -Sodium today was 120 but  corrects to 123-125 when glucose is considered. Other pertinent labs include serum osmolality 270, urine sodium 48, and urine osmolality 288.  Review of labs from recent admission showed similar hyponatremia labs (from 12/24/2022: sodium 126, serum osmolality 284, urine osmolality 350, and urine sodium 61.  TSH at that time was 5.23 and T4 1.14).  -These numbers are most suggestive of SIADH.  I suspect that SIADH is most likely due to antidepressants (citalopram and bupropion).  At this point he has not had recent surgery.  He has no known pulmonary or intracranial concern.  I do note that he had a chest x-ray on 12/22/2022 that showed streaky bibasilar infiltrates suggestive of atelectasis or possibly pneumonia.  Follow-up chest x-ray was recommended.  -Admit to observation  -Repeat chest x-ray to ensure no pulmonary process  -Fluid restriction 1500 mL per 24 hours. Steve estimates that he has been drinking about 132 ounces of liquids daily (roughly 4 liters).  -Repeat sodium and glucose at 10 PM.  AM basic metabolic panel.  -Check a.m. cortisol level as a screen for adrenal insufficiency  -Check serum protein and fasting lipid panel in the morning to ensure no pseudohyponatremia due to hyperlipidemia or hyperproteinemia.  -Plan on tapering Celexa and bupropion.  I discussed this with the patient and his wife and they are both agreeable.  I decreased Celexa from 40 mg daily to 20 mg daily.  This could be tapered off over the next 2-3 weeks.  I decreased bupropion from  mg every night to bupropion 25 mg 3 times daily.  This could also be tapered off over the next 2-3 weeks.  Patient will need close follow-up after discharge regarding both sodium and depression, given antidepressant taper.  If symptoms of depression recur Cymbalta could be tried as this seems to have the lowest risk of causing hyponatremia.    Recent hospital admission (12/22/22 through 12/29/22) for right third finger MSSA infection with  sepsis and partial amputation  -Resume prior to admission Ancef  -Pain medications are available for use as needed    Type 2 diabetes with hyperglycemia  -Resume Lantus 20 units each night and metformin 1000 mg by mouth twice daily  -Medium resistance NovoLog sliding scale insulin    Hypertension  Dyslipidemia  -Resume prior to admission aspirin, atenolol, losartan, and Zocor    Hypothyroidism  -Resume prior to admission levothyroxine  -Thyroid function tests were checked on 12/20/2022 as discussed above    Obesity with BMI of 39.6  Obstructive sleep apnea  -CPAP with sleep    Depression  -See discussion above.  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  His symptoms were mostly irritability and not being very nice to his wife.  He never had suicide attempt or inpatient psychiatric hospitalization.  It sounds like Steve was started on citalopram for 9 or 10 years.  I am not sure when bupropion was added.  His wife thinks that maybe the dose of one or the other of these medications has been increased recently.  His wife is not sure that the medicines really help him.  She says that overall he is doing better than he was 14 years ago but he is also in a different place in his life. Steve and his wife were agreeable to tapering off of citalopram and bupropion.   -Plan to taper off of prior to admission citalopram and bupropion over the next 2-3 weeks.  I decreased Celexa from 40 mg daily to 20 mg daily.  This could be tapered off over the next 2-3 weeks.  I decreased bupropion from  mg every night to bupropion 25 mg 3 times daily.  This could also be tapered off over the next 2-3 weeks.  Patient will need close follow-up after discharge regarding both sodium and depression, given antidepressant taper.  If symptoms of depression recur Cymbalta could be tried as this seems to have the lowest risk of causing hyponatremia.        "  Diet: Moderate Consistent Carb (60 g CHO per Meal) Diet    DVT Prophylaxis: Low Risk/Ambulatory with no VTE prophylaxis indicated  [place] Catheter: Not present  Lines: PRESENT             Cardiac Monitoring: None  Code Status: Full Code      Clinically Significant Risk Factors Present on Admission        # Hyperkalemia: Highest K = 5.4 mmol/L in last 2 days, will monitor as appropriate  # Hyponatremia: Lowest Na = 119 mmol/L in last 2 days, will monitor as appropriate          # Hypertension: home medication list includes antihypertensive(s)      # Obesity: Estimated body mass index is 39.61 kg/m  as calculated from the following:    Height as of 1/6/23: 1.651 m (5' 5\").    Weight as of this encounter: 108 kg (238 lb).           Disposition Plan      Expected Discharge Date: 01/08/2023                  Bimal Palacios MD  Hospitalist Service  [facility]  Securely message with [employer] (more info)  Text page via [employer] Paging/Directory     ______________________________________________________________________    Chief Complaint   Low sodium    History is obtained from the patient, his wife Sri, Dr. Dennis, and the medical record    History of Present Illness   Steve Morgan is a 62 year old male admitted on 1/7/2023 with hyponatremia. He has history of type 2 diabetes, peripheral neuropathy, hypertension, dyslipidemia, obesity (BMI of 39.6), obstructive sleep apnea, depression, hypothyroidism, and cellulitis.  He had recent admission here from 12/22/2022 through 12/29/2022 with right third finger MSSA osteomyelitis, associated cellulitis, sepsis, acute kidney injury, and hyponatremia.  Hyponatremia was thought to be due to excessive water intake.  It improved with fluid restriction.  He had irrigation and debridement in the operating room on 12/23/2022 with amputation of distal right third finger.  He was followed by infectious disease.  He discharged to transitional care with plans for " 15 more days of Ancef.  He had labs drawn yesterday which showed sodium of 119 and glucose of 284.  He was sent to the emergency department today for evaluation and treatment.  He is feeling fine without confusion.  He has had nothing that sounds like a seizure.    Emergency department evaluation showed stable vital signs.  Laboratory evaluation showed sodium 120 (corrects to 123-125 for glucose depending on formula use), chloride 87, potassium 5.4, bicarb 24, BUN 18.7, creatinine 1.44, glucose 308, white blood cells 11, hemoglobin 8.7, serum osmolality 270, urine sodium 48, and urine osmolality 288.  Review of labs from recent admission showed similar hyponatremia labs (on 12/24/2022 sodium was 126, serum osmolality 284, urine osmolality 350, and urine sodium 61.  TSH at that time was 5.23 and T4 1.14).  I was asked to admit Steve to the hospital for further cares.  Review of Steve's medications shows no thiazide diuretic.  He is on citalopram and bupropion.        Past Medical History    Past Medical History:   Diagnosis Date     Cellulitis and abscess of trunk 6/27/2017     Depression      Hyperlipidemia LDL goal <100 10/31/2010     Hypertension goal BP (blood pressure) < 140/90 3/17/2011     Hypothyroidism 1/12/2010     Morbid obesity due to excess calories (H) 1/12/2010     Neuropathy in diabetes (H) 1/12/2010     Obesity 1/12/2010     Sleep apnea 1/12/2010    CPAP     Type 2 diabetes, HbA1C goal < 8% (H) 3/8/2011       Past Surgical History   Past Surgical History:   Procedure Laterality Date     BIOPSY       COLONOSCOPY       EYE SURGERY       GENITOURINARY SURGERY      surg for undescended testicle     IRRIGATION AND DEBRIDEMENT HAND, COMBINED Right 12/23/2022    Procedure: Right long finger irrigation and debridement with partial amputation of the right long finger. ;  Surgeon: Colby English MD;  Location: [facility] OR     REPAIR HAMMER TOE BILATERAL  5/16/2013    Procedure: REPAIR HAMMER TOE BILATERAL;   Flexor Tenotomy Toes 2,3,4,5 Bilateral Feet;  Surgeon: Saad Bangura DPM;  Location: [facility] OR       Prior to Admission Medications   Prior to Admission Medications   Prescriptions Last Dose Informant Patient Reported? Taking?   ASPIRIN 81 MG OR TABS   Yes No   Sig: Take 81 mg by mouth every evening   Calcium Carb-Cholecalciferol (CALCIUM 600 + D PO)   Yes No   Sig: Take 1 tablet by mouth daily   Continuous Blood Gluc  (FREESTYLE GIULIANA 2 READER) SIENA   No No   Si Device continuous   Continuous Blood Gluc Sensor (FREESTYLE GIULIANA 14 DAY SENSOR) [facility]   No No   Sig: USE 1 SENSOR EVERY 14 DAYS   Cyanocobalamin (VITAMIN B 12 PO)  Self Yes No   Sig: Take 250 mcg by mouth daily   LEVOXYL 137 MCG tablet   No No   Sig: TAKE 1 TABLET (137 MCG) BY MOUTH DAILY DISPENSE NAME BRAND LEVOXYL ONLY, NO GENERICS   MULTI-VITAMIN OR TABS   Yes No   Sig: Take 1 tablet by mouth daily   VITAMIN D, CHOLECALCIFEROL, PO   Yes No   Sig: Take 1,000 Units by mouth daily.   acetaminophen (TYLENOL) 325 MG tablet   No No   Sig: Take 2 tablets (650 mg) by mouth every 4 hours as needed for pain or fever   atenolol (TENORMIN) 25 MG tablet   No No   Sig: Take 1 tablet (25 mg) by mouth daily   buPROPion (WELLBUTRIN XL) 150 MG 24 hr tablet   Yes No   Sig: Take 150 mg by mouth every evening   ceFAZolin (ANCEF) 1 GM vial   No No   Sig: Inject 2 g into the vein every 8 hours for 15 days CBC with differential, creatinine, SGOT weekly while on this medication to be faxed to Dr. Preciado office.   citalopram (CELEXA) 40 MG tablet   No No   Sig: TAKE 1 TABLET BY MOUTH EVERY DAY   famotidine (PEPCID) 40 MG tablet   No No   Sig: Take 1 tablet (40 mg) by mouth daily   ferrous sulfate 325 (65 FE) MG tablet   Yes No   Sig: Take 1 tablet by mouth daily (with breakfast).   finasteride (PROSCAR) 5 MG tablet   No No   Sig: Take 1 tablet (5 mg) by mouth daily   fluticasone (FLONASE) 50 MCG/ACT nasal spray   Yes No   Sig: Spray 1 spray into both nostrils daily  as needed for rhinitis or allergies   ibuprofen (ADVIL/MOTRIN) 200 MG tablet   Yes No   Sig: Take 400 mg by mouth every 6 hours as needed for pain   insulin glargine (LANTUS VIAL) 100 UNIT/ML vial   Yes No   Sig: Inject 18 Units Subcutaneous At Bedtime   latanoprost (XALATAN) 0.005 % ophthalmic solution   Yes No   Sig: INSTILL 1 DROP INTO BOTH EYES IN THE EVENING   losartan (COZAAR) 100 MG tablet   No No   Sig: Take 1 tablet (100 mg) by mouth daily   metFORMIN (GLUCOPHAGE) 1000 MG tablet   No No   Sig: Take 1 tablet (1,000 mg) by mouth 2 times daily (with meals)   ondansetron (ZOFRAN ODT) 4 MG ODT tab   Yes No   Sig: Take 4 mg by mouth At Bedtime   oxyCODONE (ROXICODONE) 5 MG tablet   No No   Sig: Take 0.5-1 tablets (2.5-5 mg) by mouth every 6 hours as needed for moderate to severe pain or breakthrough pain (Take 2.5 mg for pain 5-7. Take 5 mg for pain 8-10.)   pantoprazole (PROTONIX) 40 MG EC tablet   Yes No   Sig: Take 40 mg by mouth daily as needed for heartburn   senna-docusate (SENOKOT-S/PERICOLACE) 8.6-50 MG tablet   No No   Sig: Take 1-2 tablets by mouth 2 times daily as needed for constipation   simvastatin (ZOCOR) 20 MG tablet   No No   Sig: Take 1 tablet (20 mg) by mouth At Bedtime      Facility-Administered Medications: None        Review of Systems    The 10 point Review of Systems is negative other than noted in the HPI or here.     Social History   I have reviewed this patient's social history and updated it with pertinent information if needed.  Social History     Tobacco Use     Smoking status: Never     Smokeless tobacco: Never   Vaping Use     Vaping Use: Never used   Substance Use Topics     Alcohol use: Yes     Comment: Occassionally     Drug use: No       Family History   I have reviewed this patient's family history and updated it with pertinent information if needed.  Family History   Problem Relation Age of Onset     Breast Cancer Mother      Hypertension Mother      Thyroid Disease Mother       Depression Mother      Alzheimer Disease Mother 82     Cancer - colorectal Father      Thyroid Disease Father      Depression Father      Other - See Comments Father         bladder polyps     Heart Disease Maternal Grandmother         CHF     Circulatory Paternal Grandmother      Cancer Paternal Grandfather      Diabetes Brother      Diabetes Brother      Asthma Brother        Allergies   No Known Allergies     Physical Exam   Vital Signs: Temp: 98.2  F (36.8  C) Temp src: Temporal BP: (!) 146/79 Pulse: 59   Resp: 18 SpO2: 98 % O2 Device: None (Room air)    Weight: 238 lbs 0 oz    GENERAL: Pleasant and cooperative. No acute distress.  EYES: Pupils equal and round. No scleral erythema or icterus.  ENT: External ears are normal without deformity. Posterior oropharynx is without erythem, swelling, or exudate.  NECK: Supple. No masses or swelling. No tenderness. Thyroid is normal without mass or tenderness.  CHEST: Clear to auscultation. Normal breath sounds. No retractions.   CV: Regular rate and rhythm. No JVD. Pulses normal.  ABDOMEN: Bowel sounds present. No tenderness. No masses or hernia.  EXTREMETIES: No clubbing, cyanosis, or ischemia. Right third finger with distal amputation. Finger remains erythematous but improved per patient.  SKIN: Warm and dry to touch. No wounds or rashes.  NEUROLOGIC: Strength and sensation are normal. Deep tendon reflexes are normal. Cranial nerves are normal.        Medical Decision Making       70 MINUTES SPENT BY ME on the date of service doing chart review, history, exam, documentation & further activities per the note.      Data     I have personally reviewed the following data over the past 24 hrs:    11.0  \   8.7 (L)   / 398     120 (L) 87 (L) 18.7 /  308 (H)   5.4 (H) 24 1.44 (H) \       ALT: N/A AST: N/A AP: N/A TBILI: N/A   ALB: N/A TOT PROTEIN: N/A LIPASE: N/A       Imaging results reviewed over the past 24 hrs:   No results found for this or any previous visit (from  the past 24 hour(s)).  Recent Labs   Lab 01/07/23  1452 01/06/23  1410   WBC 11.0 6.1   HGB 8.7* 8.4*   MCV 87 88    412   * 119*   POTASSIUM 5.4* 5.2   CHLORIDE 87* 88*   CO2 24 16*   BUN 18.7 22.1   CR 1.44* 1.21*  1.22*   ANIONGAP 9 15   CHAR 8.6* 8.7*   * 284*   AST  --  33

## 2023-01-09 ENCOUNTER — LAB REQUISITION (OUTPATIENT)
Dept: LAB | Facility: CLINIC | Age: 63
End: 2023-01-09
Payer: MEDICARE

## 2023-01-09 VITALS
DIASTOLIC BLOOD PRESSURE: 74 MMHG | RESPIRATION RATE: 18 BRPM | HEART RATE: 60 BPM | TEMPERATURE: 98 F | HEIGHT: 65 IN | BODY MASS INDEX: 39.54 KG/M2 | OXYGEN SATURATION: 100 % | WEIGHT: 237.3 LBS | SYSTOLIC BLOOD PRESSURE: 159 MMHG

## 2023-01-09 DIAGNOSIS — L03.113 CELLULITIS OF RIGHT UPPER LIMB: ICD-10-CM

## 2023-01-09 LAB
ANION GAP SERPL CALCULATED.3IONS-SCNC: 8 MMOL/L (ref 7–15)
ATRIAL RATE - MUSE: 59 BPM
BUN SERPL-MCNC: 17.8 MG/DL (ref 8–23)
CALCIUM SERPL-MCNC: 8.9 MG/DL (ref 8.8–10.2)
CHLORIDE SERPL-SCNC: 91 MMOL/L (ref 98–107)
CREAT SERPL-MCNC: 1.2 MG/DL (ref 0.67–1.17)
DEPRECATED HCO3 PLAS-SCNC: 25 MMOL/L (ref 22–29)
DIASTOLIC BLOOD PRESSURE - MUSE: NORMAL MMHG
ERYTHROCYTE [DISTWIDTH] IN BLOOD BY AUTOMATED COUNT: 13.9 % (ref 10–15)
GFR SERPL CREATININE-BSD FRML MDRD: 68 ML/MIN/1.73M2
GLUCOSE BLDC GLUCOMTR-MCNC: 130 MG/DL (ref 70–99)
GLUCOSE BLDC GLUCOMTR-MCNC: 146 MG/DL (ref 70–99)
GLUCOSE BLDC GLUCOMTR-MCNC: 196 MG/DL (ref 70–99)
GLUCOSE SERPL-MCNC: 147 MG/DL (ref 70–99)
HCT VFR BLD AUTO: 28.8 % (ref 40–53)
HGB BLD-MCNC: 9.2 G/DL (ref 13.3–17.7)
INTERPRETATION ECG - MUSE: NORMAL
MCH RBC QN AUTO: 28.1 PG (ref 26.5–33)
MCHC RBC AUTO-ENTMCNC: 31.9 G/DL (ref 31.5–36.5)
MCV RBC AUTO: 88 FL (ref 78–100)
P AXIS - MUSE: 45 DEGREES
PLATELET # BLD AUTO: 359 10E3/UL (ref 150–450)
POTASSIUM SERPL-SCNC: 4.9 MMOL/L (ref 3.4–5.3)
PR INTERVAL - MUSE: 190 MS
QRS DURATION - MUSE: 88 MS
QT - MUSE: 404 MS
QTC - MUSE: 399 MS
R AXIS - MUSE: -7 DEGREES
RBC # BLD AUTO: 3.27 10E6/UL (ref 4.4–5.9)
SODIUM SERPL-SCNC: 124 MMOL/L (ref 136–145)
SYSTOLIC BLOOD PRESSURE - MUSE: NORMAL MMHG
T AXIS - MUSE: 7 DEGREES
VENTRICULAR RATE- MUSE: 59 BPM
WBC # BLD AUTO: 5.4 10E3/UL (ref 4–11)

## 2023-01-09 PROCEDURE — 96376 TX/PRO/DX INJ SAME DRUG ADON: CPT

## 2023-01-09 PROCEDURE — 36415 COLL VENOUS BLD VENIPUNCTURE: CPT | Performed by: INTERNAL MEDICINE

## 2023-01-09 PROCEDURE — 250N000013 HC RX MED GY IP 250 OP 250 PS 637: Performed by: INTERNAL MEDICINE

## 2023-01-09 PROCEDURE — 99239 HOSP IP/OBS DSCHRG MGMT >30: CPT | Performed by: INTERNAL MEDICINE

## 2023-01-09 PROCEDURE — 85027 COMPLETE CBC AUTOMATED: CPT | Performed by: INTERNAL MEDICINE

## 2023-01-09 PROCEDURE — 82962 GLUCOSE BLOOD TEST: CPT

## 2023-01-09 PROCEDURE — 96372 THER/PROPH/DIAG INJ SC/IM: CPT

## 2023-01-09 PROCEDURE — G0378 HOSPITAL OBSERVATION PER HR: HCPCS

## 2023-01-09 PROCEDURE — 80048 BASIC METABOLIC PNL TOTAL CA: CPT | Performed by: INTERNAL MEDICINE

## 2023-01-09 PROCEDURE — 250N000011 HC RX IP 250 OP 636: Performed by: INTERNAL MEDICINE

## 2023-01-09 PROCEDURE — 99207 PR NO BILLABLE SERVICE THIS VISIT: CPT | Performed by: INTERNAL MEDICINE

## 2023-01-09 RX ADMIN — LEVOTHYROXINE SODIUM 137 MCG: 112 TABLET ORAL at 08:45

## 2023-01-09 RX ADMIN — SENNOSIDES AND DOCUSATE SODIUM 1 TABLET: 50; 8.6 TABLET ORAL at 08:46

## 2023-01-09 RX ADMIN — Medication 15 G: at 13:11

## 2023-01-09 RX ADMIN — BUPROPION HYDROCHLORIDE 25 MG: 100 TABLET, FILM COATED ORAL at 13:11

## 2023-01-09 RX ADMIN — CITALOPRAM HYDROBROMIDE 20 MG: 20 TABLET ORAL at 08:46

## 2023-01-09 RX ADMIN — FAMOTIDINE 40 MG: 20 TABLET ORAL at 08:46

## 2023-01-09 RX ADMIN — METFORMIN HYDROCHLORIDE 1000 MG: 500 TABLET ORAL at 08:45

## 2023-01-09 RX ADMIN — ATENOLOL 25 MG: 25 TABLET ORAL at 08:46

## 2023-01-09 RX ADMIN — CEFAZOLIN SODIUM 2 G: 2 INJECTION, SOLUTION INTRAVENOUS at 04:44

## 2023-01-09 RX ADMIN — CEFAZOLIN SODIUM 2 G: 2 INJECTION, SOLUTION INTRAVENOUS at 13:11

## 2023-01-09 RX ADMIN — BUPROPION HYDROCHLORIDE 25 MG: 100 TABLET, FILM COATED ORAL at 08:51

## 2023-01-09 RX ADMIN — ACETAMINOPHEN 650 MG: 325 TABLET, FILM COATED ORAL at 02:11

## 2023-01-09 RX ADMIN — LOSARTAN POTASSIUM 100 MG: 100 TABLET, FILM COATED ORAL at 08:46

## 2023-01-09 ASSESSMENT — ACTIVITIES OF DAILY LIVING (ADL)
ADLS_ACUITY_SCORE: 21

## 2023-01-09 ASSESSMENT — ENCOUNTER SYMPTOMS: WOUND: 1

## 2023-01-09 NOTE — DISCHARGE SUMMARY
Hospitalist Discharge Summary  [facility]    Steve Morgan MRN# [id number]   YOB: 1960 Age: 62 year old     Date of Admission:  1/7/2023  Date of Discharge:  1/9/2023  Admitting Physician:  Bimal Palacios MD  Discharge Physician:  Magan Lyon DO  Discharging Service:  Hospitalist     Primary Provider: Lisa Pierre          Discharge Diagnosis:     Acute on Chronic Hyponatremia  - serum osmolality 270, urine sodium 48, and urine osmolality 288 -> consistent with SIADH  - Repeat CXR negative  - Baseline Sodium appears to be upper 120s-low 130s  - Fluid restriction 1500 mL  - AM cortisol 12.1.  Recommend outpatient ACTH stimulation test with his Endocrinologist  - Triglyceride level 99  - Plan to taper celexa and bupropion discussed with pt.  Plan to cut dose every week until tapered off.  We discussed that if his depression increases, he could consider cymbalta.  We also discussed that if he tapers off the medications and his hyponatremia continues, then he could consider restarting celexa and bupropion as they are likely not the cause  - Appreciate Nephrology recommendations     Recent Right Third Finger MSSA with Sepsis (admitted 12/22-12/29/2022)  - Continue IV ancef with midline in place     Uncontrolled Type 2 Diabetes Mellitus with Hyperglycemia  - A1C = 9.3 on 1/7/2023  - Continue PTA lantus 20 units at bedtime and metformin 1000 mg BID  - ISS  - Discussed with pt that his uncontrolled diabetes may put him at risk for slowing bowel function and constipation and gastroparesis     Hypertension  Hyperlipidemia  - Continue PTA ASA, atenolol, losartan, zocor     Hypothyroidism  - Continue PTA levothyroxine     Chronic Medical Problems:  Depression  Obesity - BMI 39.6  DAMIÁN             Discharge Disposition:     Discharged to TCU           Allergies:     No Known Allergies           Discharge Medications:     Current Discharge Medication List      START taking these  medications    Details   buPROPion (WELLBUTRIN) 100 MG tablet Take 0.25 tablets (25 mg) by mouth 3 times daily    Associated Diagnoses: Depression, unspecified depression type      Urea (URE-NA) 15 g PACK packet Take 15 g by mouth daily for 14 days  Qty: 14 packet, Refills: 0    Associated Diagnoses: Hyponatremia         CONTINUE these medications which have CHANGED    Details   citalopram (CELEXA) 20 MG tablet Take 1 tablet (20 mg) by mouth daily    Associated Diagnoses: Depression, unspecified depression type      oxyCODONE (ROXICODONE) 5 MG tablet Take 0.5-1 tablets (2.5-5 mg) by mouth every 6 hours as needed for severe pain (7-10)  Qty: 10 tablet, Refills: 0    Associated Diagnoses: Cellulitis of finger of right hand         CONTINUE these medications which have NOT CHANGED    Details   acetaminophen (TYLENOL) 325 MG tablet Take 2 tablets (650 mg) by mouth every 4 hours as needed for pain or fever  Qty: 100 tablet, Refills: 0    Associated Diagnoses: Finger osteomyelitis, right (H)      ASPIRIN 81 MG OR TABS Take 81 mg by mouth every evening  Qty: 100, Refills: 3      atenolol (TENORMIN) 25 MG tablet Take 1 tablet (25 mg) by mouth daily  Qty: 90 tablet, Refills: 1    Associated Diagnoses: Essential hypertension with goal blood pressure less than 140/90      Calcium Carb-Cholecalciferol (CALCIUM 600 + D PO) Take 1 tablet by mouth daily      ceFAZolin (ANCEF) 1 GM vial Inject 2 g into the vein every 8 hours for 15 days CBC with differential, creatinine, SGOT weekly while on this medication to be faxed to Dr. Preciado office.  Qty: 1 each, Refills: 1    Associated Diagnoses: Cellulitis of finger of right hand      Continuous Blood Gluc  (FREESTYLE GIULIANA 2 READER) SIENA 1 Device continuous  Qty: 1 each, Refills: 0    Associated Diagnoses: Type 2 diabetes mellitus with diabetic neuropathy, with long-term current use of insulin (H)      Continuous Blood Gluc Sensor (FREESTYLE GIULIANA 14 DAY SENSOR) MISC USE 1  SENSOR EVERY 14 DAYS  Qty: 2 each, Refills: 11    Comments: DX Code Needed  .  Associated Diagnoses: Type 2 diabetes mellitus with diabetic neuropathy, with long-term current use of insulin (H)      Cyanocobalamin (VITAMIN B 12 PO) Take 250 mcg by mouth daily    Associated Diagnoses: Excessive anger      famotidine (PEPCID) 40 MG tablet Take 1 tablet (40 mg) by mouth daily  Qty: 90 tablet, Refills: 1    Associated Diagnoses: Gastroesophageal reflux disease with esophagitis without hemorrhage      ferrous sulfate 325 (65 FE) MG tablet Take 1 tablet by mouth daily (with breakfast).      finasteride (PROSCAR) 5 MG tablet Take 1 tablet (5 mg) by mouth daily  Qty: 90 tablet, Refills: 1    Associated Diagnoses: Benign prostatic hyperplasia with urinary hesitancy      fluticasone (FLONASE) 50 MCG/ACT nasal spray Spray 1 spray into both nostrils daily as needed for rhinitis or allergies      ibuprofen (ADVIL/MOTRIN) 200 MG tablet Take 400 mg by mouth every 6 hours as needed for pain      insulin glargine (LANTUS VIAL) 100 UNIT/ML vial Inject 18 Units Subcutaneous At Bedtime      latanoprost (XALATAN) 0.005 % ophthalmic solution INSTILL 1 DROP INTO BOTH EYES IN THE EVENING      LEVOXYL 137 MCG tablet TAKE 1 TABLET (137 MCG) BY MOUTH DAILY DISPENSE NAME BRAND LEVOXYL ONLY, NO GENERICS  Qty: 90 tablet, Refills: 0    Associated Diagnoses: Hypothyroidism due to acquired atrophy of thyroid      losartan (COZAAR) 100 MG tablet Take 1 tablet (100 mg) by mouth daily  Qty: 90 tablet, Refills: 1    Associated Diagnoses: Essential hypertension with goal blood pressure less than 140/90      metFORMIN (GLUCOPHAGE) 1000 MG tablet Take 1 tablet (1,000 mg) by mouth 2 times daily (with meals)  Qty: 180 tablet, Refills: 1    Associated Diagnoses: Hyperglycemia; Type 2 diabetes mellitus with diabetic neuropathy, without long-term current use of insulin (H)      MULTI-VITAMIN OR TABS Take 1 tablet by mouth daily  Qty: 30, Refills: 0     "  ondansetron (ZOFRAN ODT) 4 MG ODT tab Take 4 mg by mouth At Bedtime      pantoprazole (PROTONIX) 40 MG EC tablet Take 40 mg by mouth daily as needed for heartburn      senna-docusate (SENOKOT-S/PERICOLACE) 8.6-50 MG tablet Take 1-2 tablets by mouth 2 times daily as needed for constipation  Qty: 30 tablet, Refills: 0    Associated Diagnoses: Finger osteomyelitis, right (H)      simvastatin (ZOCOR) 20 MG tablet Take 1 tablet (20 mg) by mouth At Bedtime  Qty: 90 tablet, Refills: 1    Associated Diagnoses: Hyperlipidemia LDL goal <100      VITAMIN D, CHOLECALCIFEROL, PO Take 1,000 Units by mouth daily.      polyethylene glycol (MIRALAX) 17 GM/Dose powder Take 17 g by mouth daily as needed for constipation  Qty: 510 g    Associated Diagnoses: Constipation, unspecified constipation type         STOP taking these medications       buPROPion (WELLBUTRIN XL) 150 MG 24 hr tablet Comments:   Reason for Stopping:                      Condition on Discharge:     Discharge condition: Fair   Discharge vitals: Blood pressure 137/63, pulse 53, temperature 97.6  F (36.4  C), temperature source Oral, resp. rate 18, height 1.651 m (5' 5\"), weight 107.6 kg (237 lb 4.8 oz), SpO2 97 %.   Code status on discharge: Full Code      BASIC PHYSICAL EXAMINATION:  GENERAL: No apparent distress.  CARDIOVASCULAR: Regular rate and rhythm without murmurs.  PULMONARY: Clear to auscultation bilaterally.   GASTROINTESTINAL: Abdomen soft, non-tender.  EXTREMITIES: No edema, pulses intact.  NEUROLOGIC: No focal deficits.            History of Illness:   See detailed admission note for full details.               Procedures excluding imaging which is summarized below:     Please see details in the electronic medical record.           Consultations:     VASCULAR ACCESS ADULT IP CONSULT  CARE MANAGEMENT / SOCIAL WORK IP CONSULT  NEPHROLOGY IP CONSULT          Significant Results:     Results for orders placed or performed during the hospital encounter of " 01/07/23   XR Chest 2 Views    Narrative    EXAM: XR CHEST 2 VIEWS  LOCATION: [facility]  DATE/TIME: 1/7/2023 10:39 PM    INDICATION: follow up bibasilar infiltrates  COMPARISON: None.      Impression    IMPRESSION: Negative chest.       Transthoracic Echocardiogram Results:  No results found for this or any previous visit (from the past 4320 hour(s)).             Pending Results:     Unresulted Labs Ordered in the Past 30 Days of this Admission     No orders found from 12/8/2022 to 1/8/2023.                      Discharge Instructions and Follow-Up:     Discharge instructions and follow-up:   Discharge Procedure Orders   Primary Care - Care Coordination Referral   Standing Status: Future   Referral Priority: Routine: Next available opening Referral Type: Care Coordination   Number of Visits Requested: 1     General info for SNF   Order Comments: Length of Stay Estimate: Short Term Care: Estimated # of Days 31-90  Condition at Discharge: Stable  Level of care:skilled   Rehabilitation Potential: Fair  Admission H&P remains valid and up-to-date: Yes  Recent Chemotherapy: N/A  Use Nursing Home Standing Orders: Yes     Mantoux instructions   Order Comments: Give two-step Mantoux (PPD) Per Facility Policy Yes     Reason for your hospital stay   Order Comments: Acute on Chronic Hyponatremia     Glucose monitor nursing POCT   Order Comments: Before meals and at bedtime     Intake and output   Order Comments: Every shift     IV access   Order Comments: Midline.     Activity - Up ad ami     Order Specific Question Answer Comments   Is discharge order? Yes      Follow Up and recommended labs and tests   Order Comments: Follow up with jail physician.  The following labs/tests are recommended: BMP.    Recommend you follow up with Nephrology ([facility] [phone]) in 1-2 weeks for a hospital follow up.  Any available practitioner would be fine to see.    Recommend you follow-up with  your psychiatrist and/or primary care doctor in 1 to 2 weeks regarding your antidepressant medications.  Recommend you slowly taper your Celexa and bupropion over the next 2 to 3 weeks.  Recommend you decrease your Celexa to 10 mg daily on 1/16/2022.  Recommend you decrease the dose of your bupropion to 25 mg twice daily on 1/16/2022.  If you taper completely off of these medications and your hyponatremia/low sodium levels continue he may be able to restart these.  Ultimately recommend you follow-up with your psychiatrist and/or primary care doctor regarding this.    Recommend you follow-up with your endocrinologist in 2 to 3 weeks regarding your diabetes medications and elevated blood sugars.  You may also benefit from a ACTH stimulation test to check for adrenal insufficiency in the setting of your chronic hyponatremia.  Your endocrinologist would be able to coordinate this, if needed.    Recommend you decrease your water intake to no more than 1500 mL daily.  If you have trouble with constipation recommend you take a stool softener and laxative daily.  There are several over-the-counter options including senna, Senokot, Colace, MiraLAX, Metamucil.  All would be beneficial to keep you more regular.     Full Code     Order Specific Question Answer Comments   Code status determined by: Discussion with patient/ legal decision maker      Diet   Order Comments: Follow this diet upon discharge: Orders Placed This Encounter      Fluid restriction 1500 ML FLUID      Moderate Consistent Carb (60 g CHO per Meal) Diet     Order Specific Question Answer Comments   Is discharge order? Yes              Hospital Course:     Steve Morgan is a 62 year old male with a history of chronic hyponatremia, depression, type 2 diabetes mellitus with neuropathy, hypertension, dyslipidemia, obesity, hypothyroidism admitted on 1/7/2023 from his TCU due to sodium level of 119 and glucose of 284.  Of note, the patient was recently admitted  from 12/22/2022 to 12/29/2022 for the treatment of right third finger MSSA osteomyelitis with associated cellulitis.  The patient was discharged on IV Ancef.  The patient was also treated at that time for hyponatremia with fluid restriction.  In the emergency department, the patient was found to have a temperature of 98.2  F, blood pressure 146/79, heart rate 59, respiratory rate 18, SPO2 98% on room air.  Initial lab work showed hemoglobin 8.7, sodium 120, potassium 5.4, chloride 87, BUN/creatinine 18.7/1.44, calcium 8.6, glucose 308.  Urine sodium returned at 48, it serum osmolality 270, urine osmolality 288.  Lab work was suggestive of SIADH.  The patient was placed on fluid restriction.  The patient's antidepressants were decreased in dose with the intention of tapering off over the next 2 to 3 weeks due to concern they may be contributing to his hyponatremia.  The patient also admitted to drinking 4 L of water daily.  It was recommended he decrease this as it is likely contributing to his hyponatremia.  Tapering of the patient's antidepressants was discussed at length with the patient who is agreeable.  It was recommended to follow-up with his psychiatrist or primary care doctor in 2 to 3 weeks regarding his depression and medication regimen.  The patient had persistent hyponatremia so nephrology was consulted.  Nephrology recommended starting Urea 15 gm daily for 14 days and follow up as an outpatient.  The patient was also instructed to decrease his free water intake and to follow a fluid restriction diet.  On 1/9/2023, the patient was discharged to TCU to complete his IV antibiotics for his osteomyelitis.    The patient was seen, examined, and counseled on this day. The hospitalization and plan of care was reviewed with the patient extensively. All questions were addressed and the patient agreed to follow-up as noted above.      Total time spent in face to face contact with the patient and coordinating  discharge was:  36 Minutes    Magan Lyon DO  [facility] Hospitalist  [address] [place] [address]  01/09/2023

## 2023-01-09 NOTE — PLAN OF CARE
PRIMARY DIAGNOSIS: HYPONATREMIA  OUTPATIENT/OBSERVATION GOALS TO BE MET BEFORE DISCHARGE:  1. ADLs back to baseline: Yes    2. Activity and level of assistance: Up with standby assistance.    3. Pain status: Pain free.    4. Return to near baseline physical activity: Yes     Discharge Planner Nurse   Safe discharge environment identified: Yes  Barriers to discharge: Yes       Entered by: Haylie Monaco RN 01/09/2023    Pt.alert &oriented x 4.Denies pain. SBA with  Walker.Midline to left upper arm & CDI. PIV right forearm saline locked.On  ABX Ancef q8hrs. BGs AC/HS.On 1500ml fluid restriction /24hrs.Wore CPAP for only 3 hrs. Daily wt.On contact isolation for MRSA.Possible discharge home once sodium is improved. Will continue monitoring.  Please review provider order for any additional goals.   Nurse to notify provider when observation goals have been met and patient is ready for discharge.Goal Outcome Evaluation:

## 2023-01-09 NOTE — PLAN OF CARE
PRIMARY DIAGNOSIS: HYPONATREMIA  OUTPATIENT/OBSERVATION GOALS TO BE MET BEFORE DISCHARGE:  1. ADLs back to baseline: Yes    2. Activity and level of assistance: Up with standby assistance.    3. Pain status: Pain free.    4. Return to near baseline physical activity: Yes     Discharge Planner Nurse   Safe discharge environment identified: Yes  Barriers to discharge: Yes       Entered by: Haylie Monaco RN 01/09/2023    Pt.alert &oriented x 4.Denies pain.Independent in room.Midline to left upper arm & CDI. PIV right forearm saline locked.On  ABX Ancef q8hrs. BGs AC/HS.Refusing his CPAP.On 1500ml fluid restriction /24hrs.Daily wt.On contact isolation for MRSA.Will continue monitoring.  Please review provider order for any additional goals.   Nurse to notify provider when observation goals have been met and patient is ready for discharge.Goal Outcome Evaluation:

## 2023-01-09 NOTE — PLAN OF CARE
Patient's After Visit Summary was reviewed with patient and/or family.   Patient verbalized understanding of After Visit Summary, recommended follow up and was given an opportunity to ask questions.   Discharge medications sent home with patient/family: No   Discharged with spouse    Pt was brought down to the lobby in a wheelchair and spouse is providing a safe ride back to [facility].       OBSERVATION patient END time: 3:45PM

## 2023-01-09 NOTE — PROGRESS NOTES
[facility]    Hospitalist Progress Note  Name: Steve oMrgan    MRN: [id number]  Provider:  Magan Lyon DO  Date of Service: 01/09/2023    Summary of Stay: Steve Morgan is a 62 year old male with a history of chronic hyponatremia, depression, type 2 diabetes mellitus with neuropathy, hypertension, dyslipidemia, obesity, hypothyroidism admitted on 1/7/2023 from his TCU due to sodium level of 119 and glucose of 284.  Of note, the patient was recently admitted from 12/22/2022 to 12/29/2022 for the treatment of right third finger MSSA osteomyelitis with associated cellulitis.  The patient was discharged on IV Ancef.  The patient was also treated at that time for hyponatremia with fluid restriction.  In the emergency department, the patient was found to have a temperature of 98.2  F, blood pressure 146/79, heart rate 59, respiratory rate 18, SPO2 98% on room air.  Initial lab work showed hemoglobin 8.7, sodium 120, potassium 5.4, chloride 87, BUN/creatinine 18.7/1.44, calcium 8.6, glucose 308.  Urine sodium returned at 48, it serum osmolality 270, urine osmolality 288.  Lab work was suggestive of SIADH.  The patient was placed on fluid restriction.  The patient's antidepressants were decreased in dose with the intention of tapering off over the next 2 to 3 weeks due to concern they may be contributing to his hyponatremia.  The patient also admitted to drinking 4 L of water daily.  It was recommended he decrease this as it is likely contributing to his hyponatremia.  Tapering of the patient's antidepressants was discussed at length with the patient who is agreeable.  It was recommended to follow-up with his psychiatrist or primary care doctor in 2 to 3 weeks regarding his depression and medication regimen.  The patient had persistent hyponatremia so nephrology was consulted.    TODAY'S PLAN:  Appreciate Nephrology recommendations.  Sodium 124 this morning.  Minimal change in sodium despite  fluid restriction.  Await nephrology recommendations.  Tentative plan for discharge back to [facility] later today pending nephro recs.  Discussed discharge follow up recommendations with pt including endocrinology for ACTH stim test and further diabetes control.  Pt states his wife was planning to drive him back to the TCU.  ADDENDUM:  Discussed with Nephrology.  Recommended starting Urea 15 gm packet daily for 2 weeks and follow up with Nephrology in 2 weeks.  Discharge to TCU today.  Discussed with pt.    Problem List:   Acute on Chronic Hyponatremia  - serum osmolality 270, urine sodium 48, and urine osmolality 288 -> consistent with SIADH  - Repeat CXR negative  - Baseline Sodium appears to be upper 120s-low 130s  - Fluid restriction 1500 mL  - AM cortisol 12.1.  Recommend outpatient ACTH stimulation test with his Endocrinologist  - Triglyceride level 99  - Plan to taper celexa and bupropion discussed with pt.  Plan to cut dose every week until tapered off.  We discussed that if his depression increases, he could consider cymbalta.  We also discussed that if he tapers off the medications and his hyponatremia continues, then he could consider restarting celexa and bupropion as they are likely not the cause  - Appreciate Nephrology recommendations     Recent Right Third Finger MSSA with Sepsis (admitted 12/22-12/29/2022)  - Continue IV ancef with midline in place     Uncontrolled Type 2 Diabetes Mellitus with Hyperglycemia  - A1C = 9.3 on 1/7/2023  - Continue PTA lantus 20 units at bedtime and metformin 1000 mg BID  - ISS  - Discussed with pt that his uncontrolled diabetes may put him at risk for slowing bowel function and constipation and gastroparesis     Hypertension  Hyperlipidemia  - Continue PTA ASA, atenolol, losartan, zocor     Hypothyroidism  - Continue PTA levothyroxine     Chronic Medical Problems:  Depression  Obesity - BMI 39.6  DAMIÁN    DVT Prophylaxis: Pneumatic Compression Devices  Code Status:  Full Code  Diet: Moderate Consistent Carb (60 g CHO per Meal) Diet  Fluid restriction 1500 ML FLUID  Diet    [place] Catheter: Not present  Disposition: Expected discharge today to TCU. Goals prior to discharge include nephro evaluation complete.   Family updated today: No     Interval History   Pt seen and examined.  Pt denies cp, sob.  Had soft BM yesterday.    -Data reviewed today: I personally reviewed all new labs and imaging results over the last 24 hours.     Physical Exam   Temp: 97.6  F (36.4  C) Temp src: Oral BP: (!) 162/83 Pulse: 63   Resp: 18 SpO2: 96 % O2 Device: None (Room air)    Vitals:    01/07/23 1444 01/08/23 0047 01/09/23 0605   Weight: 108 kg (238 lb) 108.5 kg (239 lb 3.2 oz) 107.6 kg (237 lb 4.8 oz)     Vital Signs with Ranges  Temp:  [97.6  F (36.4  C)-98.5  F (36.9  C)] 97.6  F (36.4  C)  Pulse:  [54-63] 63  Resp:  [14-18] 18  BP: (135-162)/(60-83) 162/83  FiO2 (%):  [21 %] 21 %  SpO2:  [94 %-98 %] 96 %  I/O last 3 completed shifts:  In: 720 [P.O.:720]  Out: -     GENERAL: No apparent distress. Awake, alert, and fully oriented.  HEENT: Normocephalic, atraumatic. Extraocular movements intact.  CARDIOVASCULAR: Regular rate and rhythm without murmurs or rubs. No S3.  PULMONARY: Clear bilaterally.  GASTROINTESTINAL: Soft, non-tender, non-distended. Bowel sounds normoactive.   EXTREMITIES: No cyanosis or clubbing. No edema.  NEUROLOGICAL: CN 2-12 grossly intact, no focal neurological deficits.  DERMATOLOGICAL: No rash, ulcer, bruising, nor jaundice.    Medications       aspirin  81 mg Oral QPM     atenolol  25 mg Oral Daily     buPROPion  25 mg Oral TID     ceFAZolin  2 g Intravenous Q8H     citalopram  20 mg Oral Daily     famotidine  40 mg Oral Daily     finasteride  5 mg Oral QPM     insulin aspart  1-7 Units Subcutaneous TID AC     insulin aspart  1-5 Units Subcutaneous At Bedtime     insulin glargine  20 Units Subcutaneous At Bedtime     latanoprost  1 drop Both Eyes At Bedtime      levothyroxine  137 mcg Oral Daily     losartan  100 mg Oral Daily     metFORMIN  1,000 mg Oral BID w/meals     ondansetron  4 mg Oral At Bedtime     senna-docusate  1-2 tablet Oral BID     simvastatin  20 mg Oral At Bedtime     sodium chloride (PF)  10 mL Intracatheter Q8H     Data     Laboratory:  Recent Labs   Lab 01/09/23  0825 01/08/23  0549 01/07/23  1452   WBC 5.4 7.0 11.0   HGB 9.2* 8.3* 8.7*   HCT 28.8* 25.4* 26.7*   MCV 88 86 87    357 398     Recent Labs   Lab 01/09/23  0829 01/09/23  0825 01/09/23  0205 01/08/23  1712 01/08/23  1255 01/08/23  0744 01/08/23  0549 01/08/23  0002 01/07/23  1452   NA  --  124*  --   --  123*  --  125*   < > 120*   POTASSIUM  --  4.9  --   --   --   --  5.0  --  5.4*   CHLORIDE  --  91*  --   --   --   --  92*  --  87*   CO2  --  25  --   --   --   --  24  --  24   ANIONGAP  --  8  --   --   --   --  9  --  9   * 147* 146*   < >  --    < > 169*   < > 308*   BUN  --  17.8  --   --   --   --  16.4  --  18.7   CR  --  1.20*  --   --   --   --  1.16  --  1.44*   GFRESTIMATED  --  68  --   --   --   --  70  --  54*   CHAR  --  8.9  --   --   --   --  8.7*  --  8.6*    < > = values in this interval not displayed.     No results for input(s): CULT in the last 168 hours.    Imaging:  No results found for this or any previous visit (from the past 24 hour(s)).      Magan Lyon DO  [facility] Hospitalist  [address] [place] [address]  01/09/2023

## 2023-01-09 NOTE — PLAN OF CARE
PRIMARY DIAGNOSIS: Hyponatremia  OUTPATIENT/OBSERVATION GOALS TO BE MET BEFORE DISCHARGE:  ADLs back to baseline: Yes     Activity and level of assistance: Ambulating independently.     Pain status: Pain free.     Return to near baseline physical activity: Yes          Discharge Planner Nurse   Safe discharge environment identified: Yes  Barriers to discharge: Yes       Entered by: Sharon Raya RN 01/09/2023 12PM   Pt A&Ox4. VSS. On RA. Wears CPAP at night. Denies pain, nausea, SOB, and chest pain. Sodium levels this morning were 124. On a 1500FL, carb consistent diet. Midline, single lumen, saline locked. PIVx1, saline locked. SBA/ Ind. Nephrology consulted. Plans to discharge back to TCU this afternoon. SW following.   Please review provider order for any additional goals.   Nurse to notify provider when observation goals have been met and patient is ready for discharge

## 2023-01-09 NOTE — PROGRESS NOTES
Care Management Discharge Note    Discharge Date: 01/09/2023       Discharge Disposition:  [facility]    Discharge Services:  Inpatient rehab    Discharge DME:      Discharge Transportation:  family    Private pay costs discussed: Not applicable    PAS Confirmation Code:  na  Patient/family educated on Medicare website which has current facility and service quality ratings:  na    Education Provided on the Discharge Plan:  yes  Persons Notified of Discharge Plans: patient  Patient/Family in Agreement with the Plan:  yes      Additional Information:  CM following for discharge planning. Patient returning to [facility] Transitional [facility] Unit. Spoke with [facility] Naomi medina, ph#[phone]. [place] can accept patient discharging today. Discharge instructions faxed to [facility] TCU.     TCU nurse to nurse report to be called to [facility] nursing ph# [phone]. Bedside nurse updated.     Patient states his wife will provide transportation. Wife plans to be available around 3: 30 pm.     Toni Small RN Case Manager  Inpatient Care Coordination   [facility]   [phone]          Toni Small RN

## 2023-01-09 NOTE — PLAN OF CARE
PRIMARY DIAGNOSIS: Hyponatremia  OUTPATIENT/OBSERVATION GOALS TO BE MET BEFORE DISCHARGE:  ADLs back to baseline: Yes    Activity and level of assistance: Ambulating independently.    Pain status: Pain free.    Return to near baseline physical activity: Yes     Discharge Planner Nurse   Safe discharge environment identified: Yes  Barriers to discharge: Yes       Entered by: Sharon Raya RN 01/09/2023 10:13 AM   Pt A&Ox4. VSS. On RA. Wears CPAP at night. Denies pain, nausea, SOB, and chest pain. Sodium levels this morning were 124. On a 1500FL, carb consistent diet. Midline, single lumen, saline locked. PIVx1, saline locked. SBA/ Ind. Nephrology consulted. Possible discharge today.   Please review provider order for any additional goals.   Nurse to notify provider when observation goals have been met and patient is ready for discharge.

## 2023-01-09 NOTE — PLAN OF CARE
"PRIMARY DIAGNOSIS: Hyponatremia, Hyperglycemia  OUTPATIENT/OBSERVATION GOALS TO BE MET BEFORE DISCHARGE:  ADLs back to baseline: Yes    Activity and level of assistance: Ambulating independently.    Pain status: Pain free.    Return to near baseline physical activity: Yes     Discharge Planner Nurse   Safe discharge environment identified: Yes  Barriers to discharge: Yes       Entered by: Daylin Eric, RN 01/08/2023   Pt AO x4, VSS on RA, LS clear, BS active. Pt up independently. Midline in place, currently infusing ANCEF. Tolerating Mod carb diet. Denies pain. Able to make needs known. Pending discharge if Na improves  /60 (BP Location: Right arm)   Pulse 58   Temp 97.6  F (36.4  C) (Oral)   Resp 16   Ht 1.651 m (5' 5\")   Wt 107.6 kg (237 lb 4.8 oz)   SpO2 98%   BMI 39.49 kg/m    Please review provider order for any additional goals.   Nurse to notify provider when observation goals have been met and patient is ready for discharge.  "

## 2023-01-10 ENCOUNTER — PATIENT OUTREACH (OUTPATIENT)
Dept: CARE COORDINATION | Facility: CLINIC | Age: 63
End: 2023-01-10

## 2023-01-10 ENCOUNTER — LAB REQUISITION (OUTPATIENT)
Dept: LAB | Facility: CLINIC | Age: 63
End: 2023-01-10
Payer: MEDICARE

## 2023-01-10 DIAGNOSIS — L03.113 CELLULITIS OF RIGHT UPPER LIMB: ICD-10-CM

## 2023-01-10 NOTE — PROGRESS NOTES
Clinic Care Coordination Contact  Care Coordination Transition Communication    Referral Source: IP Handoff    Clinical Data: Patient was hospitalized at [facility] from 01/07/2023 to 01/09/2023 with diagnosis of Cellulitis, Hyponatremia, amongst others.     Transition to Facility:              Facility Name: [facility]              Contact name and phone number/fax: RNCC sent secure PHI fax via communication management tab. Requested notification of patients discharge and any outstanding care coordination needs.      Plan: RN Care Coordinator will await notification from facility staff informing RN Care Coordinator of patient's discharge plans/needs. RN Care Coordinator will review chart and outreach to facility staff every 4 weeks and as needed.     Moon Higginbotham, RN Care Coordinator  [facility]  Email: [email or web address]  Phone: [phone]

## 2023-01-10 NOTE — PROGRESS NOTES
ZEKE created a new program for Primary Care-Care Coordination for patient who is established within the [facility] system and is eligible for Clinic Care Coordination.    [facility]    Background: Transitional Care Management program identified per system criteria and reviewed by [facility] team for possible outreach.    Assessment: Upon chart review, [facility] Team member will not proceed with patient outreach related to this episode of Transitional Care Management program due to reason below:    [facility] TCU: Patient discharged to TCU/ARU/LTACH and is established within [facility] Primary Care. Referral created for Primary Care-Care Coordination program.     Patient discharged to [facility] TCU. No CHW outreach call needed at this time.     Plan: Transitional Care Management episode addressed appropriately per reason noted above.      ZEKE Hardin  [phone]  [facility]    *Connected Care Resource Team does NOT follow patient ongoing. Referrals are identified based on internal discharge reports and the outreach is to ensure patient has an understanding of their discharge instructions.

## 2023-01-10 NOTE — LETTER
[facility]   To:             Please give to facility    From:   Moon Higginbotham  RN  Care Coordinator   [facility]   P: [phone]  [email or web address]   Patient Name:  Steve Morgan YOB: 1960   Admit date: 01/09/2023 (returned)       *Information Needed:  Please contact me when the patient will discharge (or if they will move to long term care)- include the discharge date, disposition, and main diagnosis   - If the patient is discharged with home care services, please provide the name of the agency    Also- Please inform me if a care conference is being held.   Phone, Fax or Email with information                        Thank you

## 2023-01-11 ENCOUNTER — TELEPHONE (OUTPATIENT)
Dept: ENDOCRINOLOGY | Facility: CLINIC | Age: 63
End: 2023-01-11

## 2023-01-11 ENCOUNTER — TRANSITIONAL CARE UNIT VISIT (OUTPATIENT)
Dept: GERIATRICS | Facility: CLINIC | Age: 63
End: 2023-01-11
Payer: MEDICARE

## 2023-01-11 VITALS
HEIGHT: 65 IN | WEIGHT: 233.4 LBS | DIASTOLIC BLOOD PRESSURE: 68 MMHG | SYSTOLIC BLOOD PRESSURE: 136 MMHG | HEART RATE: 60 BPM | OXYGEN SATURATION: 96 % | BODY MASS INDEX: 38.89 KG/M2 | TEMPERATURE: 98.1 F | RESPIRATION RATE: 18 BRPM

## 2023-01-11 DIAGNOSIS — F32.A DEPRESSION, UNSPECIFIED DEPRESSION TYPE: ICD-10-CM

## 2023-01-11 DIAGNOSIS — E11.40 TYPE 2 DIABETES MELLITUS WITH DIABETIC NEUROPATHY, WITH LONG-TERM CURRENT USE OF INSULIN (H): ICD-10-CM

## 2023-01-11 DIAGNOSIS — M86.9 FINGER OSTEOMYELITIS, RIGHT (H): ICD-10-CM

## 2023-01-11 DIAGNOSIS — M65.949 TENOSYNOVITIS OF HAND: ICD-10-CM

## 2023-01-11 DIAGNOSIS — I10 BENIGN ESSENTIAL HYPERTENSION: ICD-10-CM

## 2023-01-11 DIAGNOSIS — Z79.4 TYPE 2 DIABETES MELLITUS WITH DIABETIC NEUROPATHY, WITH LONG-TERM CURRENT USE OF INSULIN (H): ICD-10-CM

## 2023-01-11 DIAGNOSIS — E87.1 HYPONATREMIA: Primary | ICD-10-CM

## 2023-01-11 DIAGNOSIS — N18.30 STAGE 3 CHRONIC KIDNEY DISEASE, UNSPECIFIED WHETHER STAGE 3A OR 3B CKD (H): ICD-10-CM

## 2023-01-11 DIAGNOSIS — D64.9 ANEMIA, UNSPECIFIED TYPE: ICD-10-CM

## 2023-01-11 LAB
ANION GAP SERPL CALCULATED.3IONS-SCNC: 12 MMOL/L (ref 7–15)
AST SERPL W P-5'-P-CCNC: 20 U/L (ref 10–50)
BASOPHILS # BLD AUTO: 0.1 10E3/UL (ref 0–0.2)
BASOPHILS NFR BLD AUTO: 1 %
BUN SERPL-MCNC: 22.6 MG/DL (ref 8–23)
CALCIUM SERPL-MCNC: 9.2 MG/DL (ref 8.8–10.2)
CHLORIDE SERPL-SCNC: 95 MMOL/L (ref 98–107)
CREAT SERPL-MCNC: 1.24 MG/DL (ref 0.67–1.17)
CREAT SERPL-MCNC: 1.24 MG/DL (ref 0.67–1.17)
DEPRECATED HCO3 PLAS-SCNC: 22 MMOL/L (ref 22–29)
EOSINOPHIL # BLD AUTO: 0.1 10E3/UL (ref 0–0.7)
EOSINOPHIL NFR BLD AUTO: 2 %
ERYTHROCYTE [DISTWIDTH] IN BLOOD BY AUTOMATED COUNT: 14 % (ref 10–15)
GFR SERPL CREATININE-BSD FRML MDRD: 65 ML/MIN/1.73M2
GFR SERPL CREATININE-BSD FRML MDRD: 65 ML/MIN/1.73M2
GLUCOSE SERPL-MCNC: 146 MG/DL (ref 70–99)
HCT VFR BLD AUTO: 28.2 % (ref 40–53)
HGB BLD-MCNC: 9.1 G/DL (ref 13.3–17.7)
IMM GRANULOCYTES # BLD: 0 10E3/UL
IMM GRANULOCYTES NFR BLD: 0 %
LYMPHOCYTES # BLD AUTO: 1.2 10E3/UL (ref 0.8–5.3)
LYMPHOCYTES NFR BLD AUTO: 25 %
MCH RBC QN AUTO: 28.3 PG (ref 26.5–33)
MCHC RBC AUTO-ENTMCNC: 32.3 G/DL (ref 31.5–36.5)
MCV RBC AUTO: 88 FL (ref 78–100)
MONOCYTES # BLD AUTO: 0.5 10E3/UL (ref 0–1.3)
MONOCYTES NFR BLD AUTO: 11 %
NEUTROPHILS # BLD AUTO: 3 10E3/UL (ref 1.6–8.3)
NEUTROPHILS NFR BLD AUTO: 61 %
NRBC # BLD AUTO: 0 10E3/UL
NRBC BLD AUTO-RTO: 0 /100
PLATELET # BLD AUTO: 337 10E3/UL (ref 150–450)
POTASSIUM SERPL-SCNC: 4.8 MMOL/L (ref 3.4–5.3)
RBC # BLD AUTO: 3.21 10E6/UL (ref 4.4–5.9)
SODIUM SERPL-SCNC: 129 MMOL/L (ref 136–145)
WBC # BLD AUTO: 4.9 10E3/UL (ref 4–11)

## 2023-01-11 PROCEDURE — 80048 BASIC METABOLIC PNL TOTAL CA: CPT | Performed by: PHYSICIAN ASSISTANT

## 2023-01-11 PROCEDURE — 99310 SBSQ NF CARE HIGH MDM 45: CPT | Performed by: PHYSICIAN ASSISTANT

## 2023-01-11 PROCEDURE — P9604 ONE-WAY ALLOW PRORATED TRIP: HCPCS | Mod: ORL | Performed by: PHYSICIAN ASSISTANT

## 2023-01-11 PROCEDURE — 85025 COMPLETE CBC W/AUTO DIFF WBC: CPT | Mod: ORL | Performed by: PHYSICIAN ASSISTANT

## 2023-01-11 PROCEDURE — 36415 COLL VENOUS BLD VENIPUNCTURE: CPT | Mod: ORL | Performed by: PHYSICIAN ASSISTANT

## 2023-01-11 PROCEDURE — 84450 TRANSFERASE (AST) (SGOT): CPT | Performed by: PHYSICIAN ASSISTANT

## 2023-01-11 NOTE — TELEPHONE ENCOUNTER
[facility] Call Center    Phone Message    May a detailed message be left on voicemail: yes     Reason for Call: Other: Patient's Home care called in and wanted to see what providers throughts are on a hospital follow up from 1/7/2023 to 1/9/2023.  Hospital wanted patient to follow up with Endo within 2-3 weeks.  Please reach out to [facility].     Action Taken: Other: Endo    Travel Screening: Not Applicable

## 2023-01-11 NOTE — PROGRESS NOTES
"  Chief Complaint   Patient presents with     RECHECK       HPI:  Steve Morgan is a 62 year old  (1960), who is being seen today for an episodic care visit at: [facility] ([facility]) [[id number]].     Brief summary: Steve Morgan is a 62yoM with diabetes who was admitted with right 3rd finger non-healing chronic wound found to have underlying osteomyelitis and possible septic tenosynovitis as well. He'd been treated in wound clinic on-going for this since 2021 after a cut. This hospitalization he underwent I&D with partial amputation and IV antibiotics (initially broad spectrum and then tailored to Cefazolin based on findings of MSSA per ID). NWB right hand but okay to use platform walker and weight bear through forearm. Daily dressing changes and PRN dressing changes by nurse if saturation occurs. F/u with Dr. English in about 2 weeks. Of note reports of some cough too that he had on admission. CXR noted \"streaky opacities are present in both lung bases, atelectasis versus developing pneumonia\". Of note he did have COVID-19 infection treated with Paxlovid back in Sept 2022. Testing this admission negative for COVID, influenza and RSV. Per ID the cough could be r/t post-COVID type cough. Discharged to U for rehab/cares and IV antibiotic therapy (left midline placed 12/27/22).    Unfortunately he was sent back to the hospital 1/7 after routine labs revealed a sodium of 119.  Labs were consistent with SIADH.  It was elected to taper his antidepressants as possible etiology.  He was placed on a 1500 mL sodium restriction as well as sodium supplementation and discharged back to U    Today's concern is:   H&P and and discharge summary from recent hospitalization reviewed as well as infectious disease note from 1/11.    Today Caio is seen for follow up by infectious disease yesterday.  Plan for IV antibiotics to be discontinued as scheduled on 1/12.  He will continue on oral antibiotics for an " "additional 2 weeks.  His sodium was stable today at 129.  Denies increasing mood symptoms with tapering of antidepressants.  He is hopeful for discharge next week.    Spoke with RN.  No concerns.  He is compliant with fluid restriction.    Review of nursing home EMR: -140, blood glucose 119-265      Allergies, and PMH/PSH reviewed in [facility] today.    REVIEW OF SYSTEMS:  4 point ROS including Respiratory, CV, GI and , other than that noted in the HPI,  is negative    Objective:   /68   Pulse 60   Temp 98.1  F (36.7  C)   Resp 18   Ht 1.651 m (5' 5\")   Wt 105.9 kg (233 lb 6.4 oz)   SpO2 96%   BMI 38.84 kg/m      Physical Exam  Constitutional:       Appearance: Normal appearance.   HENT:      Head: Normocephalic and atraumatic.   Eyes:      General: No scleral icterus.  Cardiovascular:      Rate and Rhythm: Normal rate and regular rhythm.      Heart sounds: No murmur heard.  Pulmonary:      Effort: Pulmonary effort is normal.   Musculoskeletal:         General: Normal range of motion.      Right lower leg: No edema.      Left lower leg: No edema.   Skin:     General: Skin is warm and dry.      Findings: No rash.   Neurological:      General: No focal deficit present.      Mental Status: He is alert.          MED REC REQUIRED  Post Medication Reconciliation Status: discharge medications reconciled and changed, per note/orders      Recent labs in [facility] reviewed by me today.  and   Most Recent 3 CBC's:Recent Labs   Lab Test 01/11/23  0733 01/09/23  0825 01/08/23  0549   WBC 4.9 5.4 7.0   HGB 9.1* 9.2* 8.3*   MCV 88 88 86    359 357     Most Recent 3 BMP's:Recent Labs   Lab Test 01/11/23  0733 01/09/23  1128 01/09/23  0829 01/09/23  0825 01/08/23  1712 01/08/23  1255 01/08/23  0744 01/08/23  0549   *  --   --  124*  --  123*  --  125*   POTASSIUM 4.8  --   --  4.9  --   --   --  5.0   CHLORIDE 95*  --   --  91*  --   --   --  92*   CO2 22  --   --  25  --   --   --  24   BUN 22.6  --   --  " 17.8  --   --   --  16.4   CR 1.24*  1.24*  --   --  1.20*  --   --   --  1.16   ANIONGAP 12  --   --  8  --   --   --  9   CHAR 9.2  --   --  8.9  --   --   --  8.7*   * 196* 130* 147*   < >  --    < > 169*    < > = values in this interval not displayed.       Assessment/Plan:  Insulin-dependent type 2 diabetes, A1C 9.2  Hypoglycemia, resolved [Lantus 30 units daily, glipizide 10 mg XLdaily of metformin 1000 mg twice daily] recurrent hypoglycemia on admission to TCU.  Lantus reduced to 18 units and glipizide dc'd.    - -250  - Continue off glipizide  - Continue on Reduced Lantus 18 units  - Continue metformin  - Continue to monitor blood glucose and adjust as indicated     Finger osteomyelitis of the right  Tenosynovitis of the hand  MSSA infection:  - Course of cefazolin to be completed 1/12 as scheduled.  Then transitioning to Keflex for an additional 2 weeks.  Okay to remove PICC after antibiotics are completed tomorrow  - Had follow-up with orthopedics 1/5/2023. Healing well. Ok to leave open to air  - Continue daily wound care and dressing changes  -No issues with pain control    Acute Hyponatremia: Chronic hyponatremia with baseline -130.  Routine labs last week returned with a sodium of 119 and he was sent back to the hospital.  Labs consistent with SIADH.  - Labs today stable at 129  - Continue sodium supplementation  - Continue fluid restriction, 1500 mL/day  - Taper antidepressants as below  - BMP 1/16    Constipation:   -Continue bowel regimen    Hypertension: SBP is at goal  - Continue atenolol 25 mg daily and losartan 100 mg daily    Chronic kidney disease Baseline creatinine 1.2-1.4  - Monitor weekly, stable today at 1.24    Anemia: Hemoglobin baseline during hospital stay 8/9  - Continue B12 and iron supplementation  - Hemoglobin stable today at 9.1  - Follow-up with PCP    Depression  - Has been maintained on Wellbutrin and Celexa for some time.  During hospital stay elected to  taper and discontinue due to possible contributing factor of hyponatremia  - Continue Celexa and Wellbutrin taper as ordered from hospital admission.  Patient denies worsening depressive symptoms    Hypothyroidism  - Continue Synthroid    GERD  - Continue PPI    BPH  - Continue Proscar      Orders:  BMP and CBC 1/16    Electronically signed by: Maribel Nuñez PA-C

## 2023-01-11 NOTE — LETTER
"    1/11/2023        RE: Steve Morgan  [id number]  [place] [facility] [address]          Chief Complaint   Patient presents with     RECHECK       HPI:  Steve Morgan is a 62 year old  (1960), who is being seen today for an episodic care visit at: [facility] ([facility]) [[id number]].     Brief summary: Steve Morgan is a 62yoM with diabetes who was admitted with right 3rd finger non-healing chronic wound found to have underlying osteomyelitis and possible septic tenosynovitis as well. He'd been treated in wound clinic on-going for this since 2021 after a cut. This hospitalization he underwent I&D with partial amputation and IV antibiotics (initially broad spectrum and then tailored to Cefazolin based on findings of MSSA per ID). NWB right hand but okay to use platform walker and weight bear through forearm. Daily dressing changes and PRN dressing changes by nurse if saturation occurs. F/u with Dr. English in about 2 weeks. Of note reports of some cough too that he had on admission. CXR noted \"streaky opacities are present in both lung bases, atelectasis versus developing pneumonia\". Of note he did have COVID-19 infection treated with Paxlovid back in Sept 2022. Testing this admission negative for COVID, influenza and RSV. Per ID the cough could be r/t post-COVID type cough. Discharged to U for rehab/cares and IV antibiotic therapy (left midline placed 12/27/22).    Unfortunately he was sent back to the hospital 1/7 after routine labs revealed a sodium of 119.  Labs were consistent with SIADH.  It was elected to taper his antidepressants as possible etiology.  He was placed on a 1500 mL sodium restriction as well as sodium supplementation and discharged back to U    Today's concern is:   H&P and and discharge summary from recent hospitalization reviewed as well as infectious disease note from 1/11.    Today Caio is seen for follow up by infectious disease yesterday.  Plan for IV antibiotics " "to be discontinued as scheduled on 1/12.  He will continue on oral antibiotics for an additional 2 weeks.  His sodium was stable today at 129.  Denies increasing mood symptoms with tapering of antidepressants.  He is hopeful for discharge next week.    Spoke with RN.  No concerns.  He is compliant with fluid restriction.    Review of nursing home EMR: -140, blood glucose 119-265      Allergies, and PMH/PSH reviewed in [facility] today.    REVIEW OF SYSTEMS:  4 point ROS including Respiratory, CV, GI and , other than that noted in the HPI,  is negative    Objective:   /68   Pulse 60   Temp 98.1  F (36.7  C)   Resp 18   Ht 1.651 m (5' 5\")   Wt 105.9 kg (233 lb 6.4 oz)   SpO2 96%   BMI 38.84 kg/m      Physical Exam  Constitutional:       Appearance: Normal appearance.   HENT:      Head: Normocephalic and atraumatic.   Eyes:      General: No scleral icterus.  Cardiovascular:      Rate and Rhythm: Normal rate and regular rhythm.      Heart sounds: No murmur heard.  Pulmonary:      Effort: Pulmonary effort is normal.   Musculoskeletal:         General: Normal range of motion.      Right lower leg: No edema.      Left lower leg: No edema.   Skin:     General: Skin is warm and dry.      Findings: No rash.   Neurological:      General: No focal deficit present.      Mental Status: He is alert.          MED REC REQUIRED  Post Medication Reconciliation Status: discharge medications reconciled and changed, per note/orders      Recent labs in [facility] reviewed by me today.  and   Most Recent 3 CBC's:Recent Labs   Lab Test 01/11/23  0733 01/09/23  0825 01/08/23  0549   WBC 4.9 5.4 7.0   HGB 9.1* 9.2* 8.3*   MCV 88 88 86    359 357     Most Recent 3 BMP's:Recent Labs   Lab Test 01/11/23  0733 01/09/23  1128 01/09/23  0829 01/09/23  0825 01/08/23  1712 01/08/23  1255 01/08/23  0744 01/08/23  0549   *  --   --  124*  --  123*  --  125*   POTASSIUM 4.8  --   --  4.9  --   --   --  5.0   CHLORIDE 95*  --   --  " 91*  --   --   --  92*   CO2 22  --   --  25  --   --   --  24   BUN 22.6  --   --  17.8  --   --   --  16.4   CR 1.24*  1.24*  --   --  1.20*  --   --   --  1.16   ANIONGAP 12  --   --  8  --   --   --  9   CHAR 9.2  --   --  8.9  --   --   --  8.7*   * 196* 130* 147*   < >  --    < > 169*    < > = values in this interval not displayed.       Assessment/Plan:  Insulin-dependent type 2 diabetes, A1C 9.2  Hypoglycemia, resolved [Lantus 30 units daily, glipizide 10 mg XLdaily of metformin 1000 mg twice daily] recurrent hypoglycemia on admission to TCU.  Lantus reduced to 18 units and glipizide dc'd.    - -250  - Continue off glipizide  - Continue on Reduced Lantus 18 units  - Continue metformin  - Continue to monitor blood glucose and adjust as indicated     Finger osteomyelitis of the right  Tenosynovitis of the hand  MSSA infection:  - Course of cefazolin to be completed 1/12 as scheduled.  Then transitioning to Keflex for an additional 2 weeks.  Okay to remove PICC after antibiotics are completed tomorrow  - Had follow-up with orthopedics 1/5/2023. Healing well. Ok to leave open to air  - Continue daily wound care and dressing changes  -No issues with pain control    Acute Hyponatremia: Chronic hyponatremia with baseline -130.  Routine labs last week returned with a sodium of 119 and he was sent back to the hospital.  Labs consistent with SIADH.  - Labs today stable at 129  - Continue sodium supplementation  - Continue fluid restriction, 1500 mL/day  - Taper antidepressants as below  - BMP 1/16    Constipation:   -Continue bowel regimen    Hypertension: SBP is at goal  - Continue atenolol 25 mg daily and losartan 100 mg daily    Chronic kidney disease Baseline creatinine 1.2-1.4  - Monitor weekly, stable today at 1.24    Anemia: Hemoglobin baseline during hospital stay 8/9  - Continue B12 and iron supplementation  - Hemoglobin stable today at 9.1  - Follow-up with PCP    Depression  - Has been  maintained on Wellbutrin and Celexa for some time.  During hospital stay elected to taper and discontinue due to possible contributing factor of hyponatremia  - Continue Celexa and Wellbutrin taper as ordered from hospital admission.  Patient denies worsening depressive symptoms    Hypothyroidism  - Continue Synthroid    GERD  - Continue PPI    BPH  - Continue Proscar      Orders:  BMP and CBC 1/16    Electronically signed by: Maribel Nuñez PA-C           Sincerely,        Maribel Nuñez PA-C

## 2023-01-12 ENCOUNTER — LAB REQUISITION (OUTPATIENT)
Dept: LAB | Facility: CLINIC | Age: 63
End: 2023-01-12
Payer: MEDICARE

## 2023-01-12 DIAGNOSIS — L03.113 CELLULITIS OF RIGHT UPPER LIMB: ICD-10-CM

## 2023-01-12 RX ORDER — CITALOPRAM HYDROBROMIDE 20 MG/1
TABLET ORAL
Qty: 8 TABLET | Refills: 0
Start: 2023-01-12 | End: 2023-01-17

## 2023-01-12 RX ORDER — BUPROPION HYDROCHLORIDE 100 MG/1
TABLET ORAL
Qty: 7 TABLET | Refills: 0
Start: 2023-01-12 | End: 2023-01-17

## 2023-01-12 RX ORDER — CEPHALEXIN 500 MG/1
500 CAPSULE ORAL 3 TIMES DAILY
COMMUNITY
Start: 2023-01-13 | End: 2023-01-17

## 2023-01-15 ENCOUNTER — LAB REQUISITION (OUTPATIENT)
Dept: LAB | Facility: CLINIC | Age: 63
End: 2023-01-15
Payer: MEDICARE

## 2023-01-15 DIAGNOSIS — E87.1 HYPO-OSMOLALITY AND HYPONATREMIA: ICD-10-CM

## 2023-01-16 LAB
ANION GAP SERPL CALCULATED.3IONS-SCNC: 17 MMOL/L (ref 7–15)
BUN SERPL-MCNC: 33.9 MG/DL (ref 8–23)
CALCIUM SERPL-MCNC: 10 MG/DL (ref 8.8–10.2)
CHLORIDE SERPL-SCNC: 88 MMOL/L (ref 98–107)
CREAT SERPL-MCNC: 1.3 MG/DL (ref 0.67–1.17)
DEPRECATED HCO3 PLAS-SCNC: 21 MMOL/L (ref 22–29)
GFR SERPL CREATININE-BSD FRML MDRD: 61 ML/MIN/1.73M2
GLUCOSE SERPL-MCNC: 218 MG/DL (ref 70–99)
POTASSIUM SERPL-SCNC: 5.1 MMOL/L (ref 3.4–5.3)
SODIUM SERPL-SCNC: 126 MMOL/L (ref 136–145)

## 2023-01-16 PROCEDURE — 36415 COLL VENOUS BLD VENIPUNCTURE: CPT | Mod: ORL | Performed by: INTERNAL MEDICINE

## 2023-01-16 PROCEDURE — P9604 ONE-WAY ALLOW PRORATED TRIP: HCPCS | Mod: ORL | Performed by: INTERNAL MEDICINE

## 2023-01-16 PROCEDURE — 80048 BASIC METABOLIC PNL TOTAL CA: CPT | Mod: ORL | Performed by: INTERNAL MEDICINE

## 2023-01-16 NOTE — TELEPHONE ENCOUNTER
Encounter was routed to wrong pool.     Provider to advise. Pt was discharged from hospital on 1/9/23. Pt is scheduled for follow up on 3/30/23

## 2023-01-17 ENCOUNTER — TELEPHONE (OUTPATIENT)
Dept: FAMILY MEDICINE | Facility: CLINIC | Age: 63
End: 2023-01-17

## 2023-01-17 ENCOUNTER — DISCHARGE SUMMARY NURSING HOME (OUTPATIENT)
Dept: GERIATRICS | Facility: CLINIC | Age: 63
End: 2023-01-17
Payer: MEDICARE

## 2023-01-17 VITALS
BODY MASS INDEX: 38.82 KG/M2 | TEMPERATURE: 97.7 F | RESPIRATION RATE: 19 BRPM | DIASTOLIC BLOOD PRESSURE: 78 MMHG | OXYGEN SATURATION: 98 % | SYSTOLIC BLOOD PRESSURE: 138 MMHG | WEIGHT: 233 LBS | HEIGHT: 65 IN | HEART RATE: 61 BPM

## 2023-01-17 DIAGNOSIS — F32.A DEPRESSION, UNSPECIFIED DEPRESSION TYPE: ICD-10-CM

## 2023-01-17 DIAGNOSIS — M65.949 TENOSYNOVITIS OF HAND: ICD-10-CM

## 2023-01-17 DIAGNOSIS — E11.40 TYPE 2 DIABETES MELLITUS WITH DIABETIC NEUROPATHY, WITH LONG-TERM CURRENT USE OF INSULIN (H): ICD-10-CM

## 2023-01-17 DIAGNOSIS — E87.1 HYPONATREMIA: Primary | ICD-10-CM

## 2023-01-17 DIAGNOSIS — D64.9 ANEMIA, UNSPECIFIED TYPE: ICD-10-CM

## 2023-01-17 DIAGNOSIS — N18.30 STAGE 3 CHRONIC KIDNEY DISEASE, UNSPECIFIED WHETHER STAGE 3A OR 3B CKD (H): ICD-10-CM

## 2023-01-17 DIAGNOSIS — I10 BENIGN ESSENTIAL HYPERTENSION: ICD-10-CM

## 2023-01-17 DIAGNOSIS — Z79.4 TYPE 2 DIABETES MELLITUS WITH DIABETIC NEUROPATHY, WITH LONG-TERM CURRENT USE OF INSULIN (H): ICD-10-CM

## 2023-01-17 DIAGNOSIS — M86.9 FINGER OSTEOMYELITIS, RIGHT (H): ICD-10-CM

## 2023-01-17 DIAGNOSIS — R53.81 PHYSICAL DECONDITIONING: ICD-10-CM

## 2023-01-17 DIAGNOSIS — R79.89 ELEVATED SERUM CREATININE: ICD-10-CM

## 2023-01-17 PROCEDURE — 99316 NF DSCHRG MGMT 30 MIN+: CPT | Performed by: PHYSICIAN ASSISTANT

## 2023-01-17 RX ORDER — CEPHALEXIN 500 MG/1
500 CAPSULE ORAL 3 TIMES DAILY
Qty: 30 CAPSULE | Refills: 0 | Status: SHIPPED | OUTPATIENT
Start: 2023-01-17 | End: 2023-01-27

## 2023-01-17 RX ORDER — BUPROPION HYDROCHLORIDE 100 MG/1
25 TABLET ORAL 2 TIMES DAILY
Qty: 4 TABLET | Refills: 0 | Status: SHIPPED | OUTPATIENT
Start: 2023-01-17 | End: 2023-04-18

## 2023-01-17 RX ORDER — CITALOPRAM HYDROBROMIDE 20 MG/1
10 TABLET ORAL DAILY
Qty: 4 TABLET | Refills: 0
Start: 2023-01-16 | End: 2023-03-10

## 2023-01-17 NOTE — TELEPHONE ENCOUNTER
Patient called stating he received a call today and is wanting to know what it was in regards to. Please follow up. Thank you

## 2023-01-17 NOTE — TELEPHONE ENCOUNTER
Appears home care cannot get out to patient. Community paramedic referral ordered. Needs lab on 1.18.23.    Orders placed. Please call to inform and to see if community paramedic can get out there vs. Home care.     ANASTASIA MurdockI to RN PAL. It will be out of office, Friday 1/20/23 when results will come in. I have added Dr. Fowler as a CC'd recipient of these labs. I will let Dr. Fowler know as well, but he may need a reminder.     Thanks.

## 2023-01-17 NOTE — TELEPHONE ENCOUNTER
Per Patient is wanting to get a call back. Patient is states he is needing to be seen sooner than 3/30/2023 for an appt with Dr. Meraz. Patient states the appt is for a hospital follow up and states needs to be seen within a week or so per the Hospital Doctor. Patient also states he is needing to get his paper work straightened out with the clinic and wants an update on where they are with patients new Sensors. Patient lastly stated that his insurance had contacted the care team at the TCU and told them that patients insurance wont cover video calls. Please advise.

## 2023-01-17 NOTE — TELEPHONE ENCOUNTER
I called and informed why we called. We will c/b to reschedule when Dr Meraz advises on an in person visit, as this is what medicare requires for supplies.

## 2023-01-17 NOTE — LETTER
"    1/17/2023        RE: Steve Morgan  [id number]  [facility] [id number]        [facility] GERIATRICS DISCHARGE SUMMARY  PATIENT'S NAME: Steve Morgan  YOB: 1960  MEDICAL RECORD NUMBER:  [id number]  Place of Service where encounter took place:  [facility] ([facility]) [[id number]]    PRIMARY CARE PROVIDER AND CLINIC RESPONSIBLE AFTER TRANSFER:   Lisa Pierre PA-C, [id number] [facility] [id number]    [facility] [place]     Transferring providers: Maribel Nuñez PA-C, Dione Etienne MD  Recent Hospitalization/ED:  [facility] Hospital stay 12/22/22 to 12/29/22.  Date of SNF Admission: December 29, 2022  Date of SNF (anticipated) Discharge: January 18, 2022  Discharged to: previous independent home  Cognitive Scores: CPT: 5.1/5.6  Physical Function: Ambulating > 1000 ft with FWW  DME: No new DME needed    CODE STATUS/ADVANCE DIRECTIVES DISCUSSION:  Full Code   ALLERGIES: Patient has no known allergies.    NURSING FACILITY COURSE   Medication Changes/Rationale:     See Below    Summary of nursing facility stay:   Steve Morgan is a 62yoM with diabetes who was admitted with right 3rd finger non-healing chronic wound found to have underlying osteomyelitis and possible septic tenosynovitis as well. He'd been treated in wound clinic on-going for this since 2021 after a cut. This hospitalization he underwent I&D with partial amputation and IV antibiotics (initially broad spectrum and then tailored to Cefazolin based on findings of MSSA per ID). NWB right hand but okay to use platform walker and weight bear through forearm. Daily dressing changes and PRN dressing changes by nurse if saturation occurs. F/u with Dr. English in about 2 weeks. Of note reports of some cough too that he had on admission. CXR noted \"streaky opacities are present in both lung bases, atelectasis versus developing pneumonia\". Of note he did have COVID-19 infection treated with " Paxlovid back in Sept 2022. Testing this admission negative for COVID, influenza and RSV. Per ID the cough could be r/t post-COVID type cough. Discharged to TCU for rehab/cares and IV antibiotic therapy (left midline placed 12/27/22).    Unfortunately he was sent back to the hospital 1/7 after routine labs revealed a sodium of 119.  Labs were consistent with SIADH.  It was elected to taper his antidepressants as possible etiology.  He was placed on a 1500 mL sodium restriction as well as sodium supplementation and discharged back to TCU    He completed his IV antibiotics and progressed with therapy. He will discharge with home therapy. Sodium remained low but stable. Will have repeat labs as outpatient later this week and alert PCP to monitor    Insulin-dependent type 2 diabetes, A1C 9.2  Hypoglycemia, resolved [Lantus 30 units daily, glipizide 10 mg XLdaily of metformin 1000 mg twice daily] recurrent hypoglycemia on admission to TCU.  Lantus reduced to 18 units and glipizide dc'd.    - -250  - Continue off glipizide  - Increase Lantus to 22 units daily.  - Continue metformin  - Continue to monitor blood glucose and adjust as indicated     Finger osteomyelitis of the right  Tenosynovitis of the hand  MSSA infection:  - Completed course of Ancef and now on Keflex for additional 2 weeks  - Had follow-up with orthopedics 1/5/2023. Healing well. Ok to leave open to air  - Continue daily wound care and dressing changes  -No issues with pain control    Acute on Chronic Hyponatremia: Chronic hyponatremia with baseline -130.  Routine labs last week returned with a sodium of 119 and he was sent back to the hospital.  Labs consistent with SIADH.  - Labs 1/16 NA down to 126 from 129, though corrected to 128 for glucose  - Continue sodium supplementation. Refill sent to Pharmacy  - Continue fluid restriction, 1500 mL/day  - Taper antidepressants as below  - Have placed orders for outpatient BMP 1/19 and will alert  PCP to continue to monitor    Constipation:   -Continue bowel regimen    Hypertension: SBP is at goal  - Continue atenolol 25 mg daily and losartan 100 mg daily    Chronic kidney disease Baseline creatinine 1.2-1.4  - Labs at baseline during TCU    Anemia: Hemoglobin baseline during hospital stay 8/9  - Continue B12 and iron supplementation  - Hemoglobin stable at 9.1  - Follow-up with PCP    Depression  - Has been maintained on Wellbutrin and Celexa for some time.  During hospital stay elected to taper and discontinue due to possible contributing factor of hyponatremia  - Continue Celexa and Wellbutrin taper as ordered from hospital admission.  Patient denies worsening depressive symptoms    Hypothyroidism  - Continue Synthroid    GERD  - Continue PPI    BPH  - Continue Proscar    Discharge Medications:  MED REC REQUIRED  Post Medication Reconciliation Status: discharge medications reconciled and changed, per note/orders       Current Outpatient Medications   Medication Sig Dispense Refill     buPROPion (WELLBUTRIN) 100 MG tablet Take 0.25 tablets (25 mg) by mouth 2 times daily for 7 days 4 tablet 0     cephALEXin (KEFLEX) 500 MG capsule Take 1 capsule (500 mg) by mouth 3 times daily for 10 days 30 capsule 0     citalopram (CELEXA) 20 MG tablet Take 0.5 tablets (10 mg) by mouth daily for 7 days 4 tablet 0     Urea (URE-NA) 15 g PACK packet Take 15 g by mouth daily 30 packet 0     acetaminophen (TYLENOL) 325 MG tablet Take 2 tablets (650 mg) by mouth every 4 hours as needed for pain or fever 100 tablet 0     ASPIRIN 81 MG OR TABS Take 81 mg by mouth every evening 100 3     atenolol (TENORMIN) 25 MG tablet Take 1 tablet (25 mg) by mouth daily 90 tablet 1     Calcium Carb-Cholecalciferol (CALCIUM 600 + D PO) Take 1 tablet by mouth daily       Continuous Blood Gluc  (FREESTYLE GIULIANA 2 READER) SIENA 1 Device continuous 1 each 0     Continuous Blood Gluc Sensor (FREESTYLE GIULIANA 14 DAY SENSOR) [facility] USE 1 SENSOR  EVERY 14 DAYS 2 each 11     Cyanocobalamin (VITAMIN B 12 PO) Take 250 mcg by mouth daily       famotidine (PEPCID) 40 MG tablet Take 1 tablet (40 mg) by mouth daily 90 tablet 1     ferrous sulfate 325 (65 FE) MG tablet Take 1 tablet by mouth daily (with breakfast).       finasteride (PROSCAR) 5 MG tablet Take 1 tablet (5 mg) by mouth daily 90 tablet 1     fluticasone (FLONASE) 50 MCG/ACT nasal spray Spray 1 spray into both nostrils daily as needed for rhinitis or allergies       ibuprofen (ADVIL/MOTRIN) 200 MG tablet Take 400 mg by mouth every 6 hours as needed for pain       insulin glargine (LANTUS VIAL) 100 UNIT/ML vial Inject 22 Units Subcutaneous At Bedtime       latanoprost (XALATAN) 0.005 % ophthalmic solution INSTILL 1 DROP INTO BOTH EYES IN THE EVENING       LEVOXYL 137 MCG tablet TAKE 1 TABLET (137 MCG) BY MOUTH DAILY DISPENSE NAME BRAND LEVOXYL ONLY, NO GENERICS 90 tablet 0     losartan (COZAAR) 100 MG tablet Take 1 tablet (100 mg) by mouth daily 90 tablet 1     metFORMIN (GLUCOPHAGE) 1000 MG tablet Take 1 tablet (1,000 mg) by mouth 2 times daily (with meals) 180 tablet 1     MULTI-VITAMIN OR TABS Take 1 tablet by mouth daily 30 0     ondansetron (ZOFRAN ODT) 4 MG ODT tab Take 4 mg by mouth At Bedtime       pantoprazole (PROTONIX) 40 MG EC tablet Take 40 mg by mouth daily as needed for heartburn       polyethylene glycol (MIRALAX) 17 GM/Dose powder Take 17 g by mouth daily as needed for constipation 510 g      senna-docusate (SENOKOT-S/PERICOLACE) 8.6-50 MG tablet Take 1-2 tablets by mouth 2 times daily as needed for constipation 30 tablet 0     simvastatin (ZOCOR) 20 MG tablet Take 1 tablet (20 mg) by mouth At Bedtime 90 tablet 1     VITAMIN D, CHOLECALCIFEROL, PO Take 1,000 Units by mouth daily.            Controlled medications:   not applicable/none     Past Medical History:   Past Medical History:   Diagnosis Date     Cellulitis and abscess of trunk 6/27/2017     Depression      Hyperlipidemia LDL  "goal <100 10/31/2010     Hypertension goal BP (blood pressure) < 140/90 3/17/2011     Hypothyroidism 1/12/2010     Morbid obesity due to excess calories (H) 1/12/2010     Neuropathy in diabetes (H) 1/12/2010     Obesity 1/12/2010     Sleep apnea 1/12/2010    CPAP     Type 2 diabetes, HbA1C goal < 8% (H) 3/8/2011     Physical Exam:   Vitals: /78   Pulse 61   Temp 97.7  F (36.5  C)   Resp 19   Ht 1.651 m (5' 5\")   Wt 105.7 kg (233 lb)   SpO2 98%   BMI 38.77 kg/m    BMI: Body mass index is 38.77 kg/m .  Physical Exam  Constitutional:       General: He is not in acute distress.     Appearance: Normal appearance.   HENT:      Head: Normocephalic and atraumatic.   Eyes:      General: No scleral icterus.  Cardiovascular:      Rate and Rhythm: Normal rate and regular rhythm.      Heart sounds: No murmur heard.  Pulmonary:      Effort: Pulmonary effort is normal.      Breath sounds: No wheezing.   Musculoskeletal:      Right lower leg: No edema.      Left lower leg: No edema.   Skin:     General: Skin is warm and dry.   Neurological:      Mental Status: He is alert.           SNF labs: Recent labs in [facility] reviewed by me today.  and   Most Recent 3 CBC's:Recent Labs   Lab Test 01/11/23  0733 01/09/23  0825 01/08/23  0549   WBC 4.9 5.4 7.0   HGB 9.1* 9.2* 8.3*   MCV 88 88 86    359 357     Most Recent 3 BMP's:Recent Labs   Lab Test 01/16/23  1011 01/11/23  0733 01/09/23  1128 01/09/23  0829 01/09/23  0825   * 129*  --   --  124*   POTASSIUM 5.1 4.8  --   --  4.9   CHLORIDE 88* 95*  --   --  91*   CO2 21* 22  --   --  25   BUN 33.9* 22.6  --   --  17.8   CR 1.30* 1.24*  1.24*  --   --  1.20*   ANIONGAP 17* 12  --   --  8   CHAR 10.0 9.2  --   --  8.9   * 146* 196*   < > 147*    < > = values in this interval not displayed.     Most Recent 3 Hemoglobins:Recent Labs   Lab Test 01/11/23  0733 01/09/23  0825 01/08/23  0549   HGB 9.1* 9.2* 8.3*       DISCHARGE PLAN:    Follow up labs: BMP appointment " 1/19. Results to Lisa Pierre      Medical Follow Up:      Follow up with primary provider and infectious disease as scheduled    Discharge Services: Home Care:  Occupational Therapy, Physical Therapy, Registered Nurse and Home Health Aide    Discharge Instructions Verbalized to Patient at Discharge:     Monitor blood glucose monitoring 4 times a day. Keep a record and bring it to your next primary provider visit.     Continue to follow your diet:  1,500ml fluid restriction.     TOTAL DISCHARGE TIME:   Greater than 30 minutes  Electronically signed by:  Maribel Nuñez PA-C     Documentation of Face to Face and Certification for Home Health Services    I certify that patient: Steve Morgan is under my care and that I, or a nurse practitioner or physician's assistant working with me, had a face-to-face encounter that meets the physician face-to-face encounter requirements with this patient on: 1/17/2023.    This encounter with the patient was in whole, or in part, for the following medical condition, which is the primary reason for home health care:   1. Hyponatremia    2. Depression, unspecified depression type    3. Tenosynovitis of hand    4. Finger osteomyelitis, right (H)    5. Stage 3 chronic kidney disease, unspecified whether stage 3a or 3b CKD (H)    6. Anemia, unspecified type    7. Type 2 diabetes mellitus with diabetic neuropathy, with long-term current use of insulin (H)    8. Benign essential hypertension    9. Physical deconditioning      .    I certify that, based on my findings, the following services are medically necessary home health services: Nursing, Occupational Therapy and Physical Therapy.    My clinical findings support the need for the above services because: Nurse is needed: For complex aftercare of surgical procedures because the patient needs instruction and cannot perform care on their own due to: recent finger amputation.., Occupational Therapy Services are needed to assess  and treat cognitive ability and address ADL safety due to impairment in recent finger amputation. and Physical Therapy Services are needed to assess and treat the following functional impairments: recent finger amputation.    Further, I certify that my clinical findings support that this patient is homebound (i.e. absences from home require considerable and taxing effort and are for medical reasons or [employer] services or infrequently or of short duration when for other reasons) because: Requires assistance of another person or specialized equipment to access medical services because patient: Requires supervision of another for safe transfer...    Based on the above findings. I certify that this patient is confined to the home and needs intermittent skilled nursing care, physical therapy and/or speech therapy.  The patient is under my care, and I have initiated the establishment of the plan of care.  This patient will be followed by a physician who will periodically review the plan of care.  Physician/Provider to provide follow up care: Lisa Pierre    Attending [facility] physician (the Medicare certified [facility] provider): Dr. Dione Etienne DO    Physician Signature: See electronic signature associated with these discharge orders.  Date: 1/17/2023                  Sincerely,        Maribel Nuñez PA-C

## 2023-01-17 NOTE — TELEPHONE ENCOUNTER
Post hospital follow up can be done with PCP.  Help schedule if there are any in person opening/cancellations in next few weeks.

## 2023-01-17 NOTE — TELEPHONE ENCOUNTER
Patient calling again regarding scheduling earlier appointment with Endo. Patient has hospital follow up 1/31/23 w/ PCP. Patient believes he needs to be seen sooner by Endo regarding juan paperwork to be filled out for medicare. Please advise if patient needs to be seen sooner.     Nahun CHICAS RN 1/17/2023 at 12:01 PM

## 2023-01-17 NOTE — PROGRESS NOTES
"[facility] GERIATRICS DISCHARGE SUMMARY  PATIENT'S NAME: Steve Morgan  YOB: 1960  MEDICAL RECORD NUMBER:  [id number]  Place of Service where encounter took place:  [facility] [facility] [[id number]]    PRIMARY CARE PROVIDER AND CLINIC RESPONSIBLE AFTER TRANSFER:   Lisa Pierre PA-C, [address] [facility] / [facility] [id number]    [facility] Provider     Transferring providers: Maribel Nuñez PA-C, Dione Etienne MD  Recent Hospitalization/ED:  [facility] stay 12/22/22 to 12/29/22.  Date of SNF Admission: December 29, 2022  Date of SNF (anticipated) Discharge: January 18, 2022  Discharged to: previous independent home  Cognitive Scores: CPT: 5.1/5.6  Physical Function: Ambulating > 1000 ft with FWW  DME: No new DME needed    CODE STATUS/ADVANCE DIRECTIVES DISCUSSION:  Full Code   ALLERGIES: Patient has no known allergies.    NURSING FACILITY COURSE   Medication Changes/Rationale:     See Below    Summary of nursing facility stay:   Steve Morgan is a 62yoM with diabetes who was admitted with right 3rd finger non-healing chronic wound found to have underlying osteomyelitis and possible septic tenosynovitis as well. He'd been treated in wound clinic on-going for this since 2021 after a cut. This hospitalization he underwent I&D with partial amputation and IV antibiotics (initially broad spectrum and then tailored to Cefazolin based on findings of MSSA per ID). NWB right hand but okay to use platform walker and weight bear through forearm. Daily dressing changes and PRN dressing changes by nurse if saturation occurs. F/u with Dr. English in about 2 weeks. Of note reports of some cough too that he had on admission. CXR noted \"streaky opacities are present in both lung bases, atelectasis versus developing pneumonia\". Of note he did have COVID-19 infection treated with Paxlovid back in Sept 2022. Testing this admission negative for COVID, influenza and RSV. " Per ID the cough could be r/t post-COVID type cough. Discharged to TCU for rehab/cares and IV antibiotic therapy (left midline placed 12/27/22).    Unfortunately he was sent back to the hospital 1/7 after routine labs revealed a sodium of 119.  Labs were consistent with SIADH.  It was elected to taper his antidepressants as possible etiology.  He was placed on a 1500 mL sodium restriction as well as sodium supplementation and discharged back to TCU    He completed his IV antibiotics and progressed with therapy. He will discharge with home therapy. Sodium remained low but stable. Will have repeat labs as outpatient later this week and alert PCP to monitor    Insulin-dependent type 2 diabetes, A1C 9.2  Hypoglycemia, resolved [Lantus 30 units daily, glipizide 10 mg XLdaily of metformin 1000 mg twice daily] recurrent hypoglycemia on admission to TCU.  Lantus reduced to 18 units and glipizide dc'd.    - -250  - Continue off glipizide  - Increase Lantus to 22 units daily.  - Continue metformin  - Continue to monitor blood glucose and adjust as indicated     Finger osteomyelitis of the right  Tenosynovitis of the hand  MSSA infection:  - Completed course of Ancef and now on Keflex for additional 2 weeks  - Had follow-up with orthopedics 1/5/2023. Healing well. Ok to leave open to air  - Continue daily wound care and dressing changes  -No issues with pain control    Acute on Chronic Hyponatremia: Chronic hyponatremia with baseline -130.  Routine labs last week returned with a sodium of 119 and he was sent back to the hospital.  Labs consistent with SIADH.  - Labs 1/16 NA down to 126 from 129, though corrected to 128 for glucose  - Continue sodium supplementation. Refill sent to Pharmacy  - Continue fluid restriction, 1500 mL/day  - Taper antidepressants as below  - Have placed orders for outpatient BMP 1/19 and will alert PCP to continue to monitor    Constipation:   -Continue bowel regimen    Hypertension: SBP  is at goal  - Continue atenolol 25 mg daily and losartan 100 mg daily    Chronic kidney disease Baseline creatinine 1.2-1.4  - Labs at baseline during TCU    Anemia: Hemoglobin baseline during hospital stay 8/9  - Continue B12 and iron supplementation  - Hemoglobin stable at 9.1  - Follow-up with PCP    Depression  - Has been maintained on Wellbutrin and Celexa for some time.  During hospital stay elected to taper and discontinue due to possible contributing factor of hyponatremia  - Continue Celexa and Wellbutrin taper as ordered from hospital admission.  Patient denies worsening depressive symptoms    Hypothyroidism  - Continue Synthroid    GERD  - Continue PPI    BPH  - Continue Proscar    Discharge Medications:  MED REC REQUIRED  Post Medication Reconciliation Status: discharge medications reconciled and changed, per note/orders       Current Outpatient Medications   Medication Sig Dispense Refill     buPROPion (WELLBUTRIN) 100 MG tablet Take 0.25 tablets (25 mg) by mouth 2 times daily for 7 days 4 tablet 0     cephALEXin (KEFLEX) 500 MG capsule Take 1 capsule (500 mg) by mouth 3 times daily for 10 days 30 capsule 0     citalopram (CELEXA) 20 MG tablet Take 0.5 tablets (10 mg) by mouth daily for 7 days 4 tablet 0     Urea (URE-NA) 15 g PACK packet Take 15 g by mouth daily 30 packet 0     acetaminophen (TYLENOL) 325 MG tablet Take 2 tablets (650 mg) by mouth every 4 hours as needed for pain or fever 100 tablet 0     ASPIRIN 81 MG OR TABS Take 81 mg by mouth every evening 100 3     atenolol (TENORMIN) 25 MG tablet Take 1 tablet (25 mg) by mouth daily 90 tablet 1     Calcium Carb-Cholecalciferol (CALCIUM 600 + D PO) Take 1 tablet by mouth daily       Continuous Blood Gluc  (FREESTYLE GIULIANA 2 READER) SIENA 1 Device continuous 1 each 0     Continuous Blood Gluc Sensor (FREESTYLE GIULIANA 14 DAY SENSOR) MISC USE 1 SENSOR EVERY 14 DAYS 2 each 11     Cyanocobalamin (VITAMIN B 12 PO) Take 250 mcg by mouth daily        famotidine (PEPCID) 40 MG tablet Take 1 tablet (40 mg) by mouth daily 90 tablet 1     ferrous sulfate 325 (65 FE) MG tablet Take 1 tablet by mouth daily (with breakfast).       finasteride (PROSCAR) 5 MG tablet Take 1 tablet (5 mg) by mouth daily 90 tablet 1     fluticasone (FLONASE) 50 MCG/ACT nasal spray Spray 1 spray into both nostrils daily as needed for rhinitis or allergies       ibuprofen (ADVIL/MOTRIN) 200 MG tablet Take 400 mg by mouth every 6 hours as needed for pain       insulin glargine (LANTUS VIAL) 100 UNIT/ML vial Inject 22 Units Subcutaneous At Bedtime       latanoprost (XALATAN) 0.005 % ophthalmic solution INSTILL 1 DROP INTO BOTH EYES IN THE EVENING       LEVOXYL 137 MCG tablet TAKE 1 TABLET (137 MCG) BY MOUTH DAILY DISPENSE NAME BRAND LEVOXYL ONLY, NO GENERICS 90 tablet 0     losartan (COZAAR) 100 MG tablet Take 1 tablet (100 mg) by mouth daily 90 tablet 1     metFORMIN (GLUCOPHAGE) 1000 MG tablet Take 1 tablet (1,000 mg) by mouth 2 times daily (with meals) 180 tablet 1     MULTI-VITAMIN OR TABS Take 1 tablet by mouth daily 30 0     ondansetron (ZOFRAN ODT) 4 MG ODT tab Take 4 mg by mouth At Bedtime       pantoprazole (PROTONIX) 40 MG EC tablet Take 40 mg by mouth daily as needed for heartburn       polyethylene glycol (MIRALAX) 17 GM/Dose powder Take 17 g by mouth daily as needed for constipation 510 g      senna-docusate (SENOKOT-S/PERICOLACE) 8.6-50 MG tablet Take 1-2 tablets by mouth 2 times daily as needed for constipation 30 tablet 0     simvastatin (ZOCOR) 20 MG tablet Take 1 tablet (20 mg) by mouth At Bedtime 90 tablet 1     VITAMIN D, CHOLECALCIFEROL, PO Take 1,000 Units by mouth daily.            Controlled medications:   not applicable/none     Past Medical History:   Past Medical History:   Diagnosis Date     Cellulitis and abscess of trunk 6/27/2017     Depression      Hyperlipidemia LDL goal <100 10/31/2010     Hypertension goal BP (blood pressure) < 140/90 3/17/2011      "Hypothyroidism 1/12/2010     Morbid obesity due to excess calories (H) 1/12/2010     Neuropathy in diabetes (H) 1/12/2010     Obesity 1/12/2010     Sleep apnea 1/12/2010    CPAP     Type 2 diabetes, HbA1C goal < 8% (H) 3/8/2011     Physical Exam:   Vitals: /78   Pulse 61   Temp 97.7  F (36.5  C)   Resp 19   Ht 1.651 m (5' 5\")   Wt 105.7 kg (233 lb)   SpO2 98%   BMI 38.77 kg/m    BMI: Body mass index is 38.77 kg/m .  Physical Exam  Constitutional:       General: He is not in acute distress.     Appearance: Normal appearance.   HENT:      Head: Normocephalic and atraumatic.   Eyes:      General: No scleral icterus.  Cardiovascular:      Rate and Rhythm: Normal rate and regular rhythm.      Heart sounds: No murmur heard.  Pulmonary:      Effort: Pulmonary effort is normal.      Breath sounds: No wheezing.   Musculoskeletal:      Right lower leg: No edema.      Left lower leg: No edema.   Skin:     General: Skin is warm and dry.   Neurological:      Mental Status: He is alert.           SNF labs: Recent labs in [facility] reviewed by me today.  and   Most Recent 3 CBC's:Recent Labs   Lab Test 01/11/23  0733 01/09/23  0825 01/08/23  0549   WBC 4.9 5.4 7.0   HGB 9.1* 9.2* 8.3*   MCV 88 88 86    359 357     Most Recent 3 BMP's:Recent Labs   Lab Test 01/16/23  1011 01/11/23  0733 01/09/23  1128 01/09/23  0829 01/09/23  0825   * 129*  --   --  124*   POTASSIUM 5.1 4.8  --   --  4.9   CHLORIDE 88* 95*  --   --  91*   CO2 21* 22  --   --  25   BUN 33.9* 22.6  --   --  17.8   CR 1.30* 1.24*  1.24*  --   --  1.20*   ANIONGAP 17* 12  --   --  8   CHAR 10.0 9.2  --   --  8.9   * 146* 196*   < > 147*    < > = values in this interval not displayed.     Most Recent 3 Hemoglobins:Recent Labs   Lab Test 01/11/23  0733 01/09/23  0825 01/08/23  0549   HGB 9.1* 9.2* 8.3*       DISCHARGE PLAN:    Follow up labs: [facility] appointment 1/19. Results to Lisa Pierre      Medical Follow Up:      Follow up with primary " provider and infectious disease as scheduled    Discharge Services: Home Care:  Occupational Therapy, Physical Therapy, Registered Nurse and Home Health Aide    Discharge Instructions Verbalized to Patient at Discharge:     Monitor blood glucose monitoring 4 times a day. Keep a record and bring it to your next primary provider visit.     Continue to follow your diet:  1,500ml fluid restriction.     TOTAL DISCHARGE TIME:   Greater than 30 minutes  Electronically signed by:  Maribel Nuñez PA-C     Documentation of Face to Face and Certification for Home Health Services    I certify that patient: Steve Morgan is under my care and that I, or a nurse practitioner or physician's assistant working with me, had a face-to-face encounter that meets the physician face-to-face encounter requirements with this patient on: 1/17/2023.    This encounter with the patient was in whole, or in part, for the following medical condition, which is the primary reason for home health care:   1. Hyponatremia    2. Depression, unspecified depression type    3. Tenosynovitis of hand    4. Finger osteomyelitis, right (H)    5. Stage 3 chronic kidney disease, unspecified whether stage 3a or 3b CKD (H)    6. Anemia, unspecified type    7. Type 2 diabetes mellitus with diabetic neuropathy, with long-term current use of insulin (H)    8. Benign essential hypertension    9. Physical deconditioning      .    I certify that, based on my findings, the following services are medically necessary home health services: Nursing, Occupational Therapy and Physical Therapy.    My clinical findings support the need for the above services because: Nurse is needed: For complex aftercare of surgical procedures because the patient needs instruction and cannot perform care on their own due to: recent finger amputation.., Occupational Therapy Services are needed to assess and treat cognitive ability and address ADL safety due to impairment in recent finger  amputation. and Physical Therapy Services are needed to assess and treat the following functional impairments: recent finger amputation.    Further, I certify that my clinical findings support that this patient is homebound (i.e. absences from home require considerable and taxing effort and are for medical reasons or [employer] services or infrequently or of short duration when for other reasons) because: Requires assistance of another person or specialized equipment to access medical services because patient: Requires supervision of another for safe transfer...    Based on the above findings. I certify that this patient is confined to the home and needs intermittent skilled nursing care, physical therapy and/or speech therapy.  The patient is under my care, and I have initiated the establishment of the plan of care.  This patient will be followed by a physician who will periodically review the plan of care.  Physician/Provider to provide follow up care: Lisa Pierre    Attending hospital physician (the Medicare certified [facility] provider): Dr. Dione Etienne DO    Physician Signature: See electronic signature associated with these discharge orders.  Date: 1/17/2023

## 2023-01-17 NOTE — PATIENT INSTRUCTIONS
Steve Morgan  YOB: 1960                                   Discharge Date: 1/18      PHYSICIAN's DISCHARGE SUMMARY / ORDER SHEET  1. Discharge to: home  2. Medications:      Current Outpatient Medications   Medication Sig Dispense Refill    buPROPion (WELLBUTRIN) 100 MG tablet Take 0.25 tablets (25 mg) by mouth 2 times daily for 7 days. END DATE 1/23 4 tablet 0    cephALEXin (KEFLEX) 500 MG capsule Take 1 capsule (500 mg) by mouth 3 times daily for 10 days 30 capsule 0    citalopram (CELEXA) 20 MG tablet Take 0.5 tablets (10 mg) by mouth daily for 7 days END DATE 1/23 4 tablet 0    Urea (URE-NA) 15 g PACK packet Take 15 g by mouth daily 30 packet 0    acetaminophen (TYLENOL) 325 MG tablet Take 2 tablets (650 mg) by mouth every 4 hours as needed for pain or fever 100 tablet 0    ASPIRIN 81 MG OR TABS Take 81 mg by mouth every evening 100 3    atenolol (TENORMIN) 25 MG tablet Take 1 tablet (25 mg) by mouth daily 90 tablet 1    Calcium Carb-Cholecalciferol (CALCIUM 600 + D PO) Take 1 tablet by mouth daily      Continuous Blood Gluc  (FREESTYLE GIULIANA 2 READER) SIENA 1 Device continuous 1 each 0    Continuous Blood Gluc Sensor (FREESTYLE GIULIANA 14 DAY SENSOR) MISC USE 1 SENSOR EVERY 14 DAYS 2 each 11    Cyanocobalamin (VITAMIN B 12 PO) Take 250 mcg by mouth daily      famotidine (PEPCID) 40 MG tablet Take 1 tablet (40 mg) by mouth daily 90 tablet 1    ferrous sulfate 325 (65 FE) MG tablet Take 1 tablet by mouth daily (with breakfast).      finasteride (PROSCAR) 5 MG tablet Take 1 tablet (5 mg) by mouth daily 90 tablet 1    fluticasone (FLONASE) 50 MCG/ACT nasal spray Spray 1 spray into both nostrils daily as needed for rhinitis or allergies      ibuprofen (ADVIL/MOTRIN) 200 MG tablet Take 400 mg by mouth every 6 hours as needed for pain      insulin glargine (LANTUS VIAL) 100 UNIT/ML vial Inject 22 Units Subcutaneous At Bedtime      latanoprost (XALATAN) 0.005 % ophthalmic solution INSTILL 1 DROP INTO  BOTH EYES IN THE EVENING      LEVOXYL 137 MCG tablet TAKE 1 TABLET (137 MCG) BY MOUTH DAILY DISPENSE NAME BRAND LEVOXYL ONLY, NO GENERICS 90 tablet 0    losartan (COZAAR) 100 MG tablet Take 1 tablet (100 mg) by mouth daily 90 tablet 1    metFORMIN (GLUCOPHAGE) 1000 MG tablet Take 1 tablet (1,000 mg) by mouth 2 times daily (with meals) 180 tablet 1    MULTI-VITAMIN OR TABS Take 1 tablet by mouth daily 30 0    ondansetron (ZOFRAN ODT) 4 MG ODT tab Take 4 mg by mouth At Bedtime      pantoprazole (PROTONIX) 40 MG EC tablet Take 40 mg by mouth daily as needed for heartburn      polyethylene glycol (MIRALAX) 17 GM/Dose powder Take 17 g by mouth daily as needed for constipation 510 g     senna-docusate (SENOKOT-S/PERICOLACE) 8.6-50 MG tablet Take 1-2 tablets by mouth 2 times daily as needed for constipation 30 tablet 0    simvastatin (ZOCOR) 20 MG tablet Take 1 tablet (20 mg) by mouth At Bedtime 90 tablet 1    VITAMIN D, CHOLECALCIFEROL, PO Take 1,000 Units by mouth daily.          Discharge patient to home with current medications and treatments  Discharge Home with Home care as above  - Nursing call in 30 days supply of needed meds to pharmacy of choice upon discharge. Future refills by PCP Dr. Lisa Pierre with phone number [phone].  - Please send home with original of these discharge instructions and copy for chart.       ______________________________  Maribel Nuñez PA-C   [facility]                   1/17/2023

## 2023-01-17 NOTE — LETTER
"    1/17/2023        RE: Steve Morgan  [id number]  [facility] [address]        [facility] GERIATRICS DISCHARGE SUMMARY  PATIENT'S NAME: Steve Morgan  YOB: 1960  MEDICAL RECORD NUMBER:  [id number]  Place of Service where encounter took place:  [facility] ([facility]) [[id number]]    PRIMARY CARE PROVIDER AND CLINIC RESPONSIBLE AFTER TRANSFER:   Lisa Pierre PA-C, [id number] [facility] / [facility] [address]    [facility]     Transferring providers: Maribel Nuñez PA-C, Dione Etienne MD  Recent Hospitalization/ED:  [facility] stay 12/22/22 to 12/29/22.  Date of SNF Admission: December 29, 2022  Date of SNF (anticipated) Discharge: January 18, 2022  Discharged to: previous independent home  Cognitive Scores: CPT: 5.1/5.6  Physical Function: Ambulating > 1000 ft with FWW  DME: No new DME needed    CODE STATUS/ADVANCE DIRECTIVES DISCUSSION:  Full Code   ALLERGIES: Patient has no known allergies.    NURSING FACILITY COURSE   Medication Changes/Rationale:     See Below    Summary of nursing facility stay:   Steve Morgan is a 62yoM with diabetes who was admitted with right 3rd finger non-healing chronic wound found to have underlying osteomyelitis and possible septic tenosynovitis as well. He'd been treated in wound clinic on-going for this since 2021 after a cut. This hospitalization he underwent I&D with partial amputation and IV antibiotics (initially broad spectrum and then tailored to Cefazolin based on findings of MSSA per ID). NWB right hand but okay to use platform walker and weight bear through forearm. Daily dressing changes and PRN dressing changes by nurse if saturation occurs. F/u with Dr. English in about 2 weeks. Of note reports of some cough too that he had on admission. CXR noted \"streaky opacities are present in both lung bases, atelectasis versus developing pneumonia\". Of note he did have COVID-19 infection treated with " Paxlovid back in Sept 2022. Testing this admission negative for COVID, influenza and RSV. Per ID the cough could be r/t post-COVID type cough. Discharged to TCU for rehab/cares and IV antibiotic therapy (left midline placed 12/27/22).    Unfortunately he was sent back to the hospital 1/7 after routine labs revealed a sodium of 119.  Labs were consistent with SIADH.  It was elected to taper his antidepressants as possible etiology.  He was placed on a 1500 mL sodium restriction as well as sodium supplementation and discharged back to TCU    He completed his IV antibiotics and progressed with therapy. He will discharge with home therapy. Sodium remained low but stable. Will have repeat labs as outpatient later this week and alert PCP to monitor    Insulin-dependent type 2 diabetes, A1C 9.2  Hypoglycemia, resolved [Lantus 30 units daily, glipizide 10 mg XLdaily of metformin 1000 mg twice daily] recurrent hypoglycemia on admission to TCU.  Lantus reduced to 18 units and glipizide dc'd.    - -250  - Continue off glipizide  - Increase Lantus to 22 units daily.  - Continue metformin  - Continue to monitor blood glucose and adjust as indicated     Finger osteomyelitis of the right  Tenosynovitis of the hand  MSSA infection:  - Completed course of Ancef and now on Keflex for additional 2 weeks  - Had follow-up with orthopedics 1/5/2023. Healing well. Ok to leave open to air  - Continue daily wound care and dressing changes  -No issues with pain control    Acute on Chronic Hyponatremia: Chronic hyponatremia with baseline -130.  Routine labs last week returned with a sodium of 119 and he was sent back to the hospital.  Labs consistent with SIADH.  - Labs 1/16 NA down to 126 from 129, though corrected to 128 for glucose  - Continue sodium supplementation. Refill sent to Pharmacy  - Continue fluid restriction, 1500 mL/day  - Taper antidepressants as below  - Have placed orders for outpatient BMP 1/19 and will alert  PCP to continue to monitor    Constipation:   -Continue bowel regimen    Hypertension: SBP is at goal  - Continue atenolol 25 mg daily and losartan 100 mg daily    Chronic kidney disease Baseline creatinine 1.2-1.4  - Labs at baseline during TCU    Anemia: Hemoglobin baseline during hospital stay 8/9  - Continue B12 and iron supplementation  - Hemoglobin stable at 9.1  - Follow-up with PCP    Depression  - Has been maintained on Wellbutrin and Celexa for some time.  During hospital stay elected to taper and discontinue due to possible contributing factor of hyponatremia  - Continue Celexa and Wellbutrin taper as ordered from hospital admission.  Patient denies worsening depressive symptoms    Hypothyroidism  - Continue Synthroid    GERD  - Continue PPI    BPH  - Continue Proscar    Discharge Medications:  MED REC REQUIRED  Post Medication Reconciliation Status: discharge medications reconciled and changed, per note/orders       Current Outpatient Medications   Medication Sig Dispense Refill     buPROPion (WELLBUTRIN) 100 MG tablet Take 0.25 tablets (25 mg) by mouth 2 times daily for 7 days 4 tablet 0     cephALEXin (KEFLEX) 500 MG capsule Take 1 capsule (500 mg) by mouth 3 times daily for 10 days 30 capsule 0     citalopram (CELEXA) 20 MG tablet Take 0.5 tablets (10 mg) by mouth daily for 7 days 4 tablet 0     Urea (URE-NA) 15 g PACK packet Take 15 g by mouth daily 30 packet 0     acetaminophen (TYLENOL) 325 MG tablet Take 2 tablets (650 mg) by mouth every 4 hours as needed for pain or fever 100 tablet 0     ASPIRIN 81 MG OR TABS Take 81 mg by mouth every evening 100 3     atenolol (TENORMIN) 25 MG tablet Take 1 tablet (25 mg) by mouth daily 90 tablet 1     Calcium Carb-Cholecalciferol (CALCIUM 600 + D PO) Take 1 tablet by mouth daily       Continuous Blood Gluc  (FREESTYLE GIULIANA 2 READER) SIENA 1 Device continuous 1 each 0     Continuous Blood Gluc Sensor (FREESTYLE GIULIANA 14 DAY SENSOR) [facility] USE 1 SENSOR  EVERY 14 DAYS 2 each 11     Cyanocobalamin (VITAMIN B 12 PO) Take 250 mcg by mouth daily       famotidine (PEPCID) 40 MG tablet Take 1 tablet (40 mg) by mouth daily 90 tablet 1     ferrous sulfate 325 (65 FE) MG tablet Take 1 tablet by mouth daily (with breakfast).       finasteride (PROSCAR) 5 MG tablet Take 1 tablet (5 mg) by mouth daily 90 tablet 1     fluticasone (FLONASE) 50 MCG/ACT nasal spray Spray 1 spray into both nostrils daily as needed for rhinitis or allergies       ibuprofen (ADVIL/MOTRIN) 200 MG tablet Take 400 mg by mouth every 6 hours as needed for pain       insulin glargine (LANTUS VIAL) 100 UNIT/ML vial Inject 22 Units Subcutaneous At Bedtime       latanoprost (XALATAN) 0.005 % ophthalmic solution INSTILL 1 DROP INTO BOTH EYES IN THE EVENING       LEVOXYL 137 MCG tablet TAKE 1 TABLET (137 MCG) BY MOUTH DAILY DISPENSE NAME BRAND LEVOXYL ONLY, NO GENERICS 90 tablet 0     losartan (COZAAR) 100 MG tablet Take 1 tablet (100 mg) by mouth daily 90 tablet 1     metFORMIN (GLUCOPHAGE) 1000 MG tablet Take 1 tablet (1,000 mg) by mouth 2 times daily (with meals) 180 tablet 1     MULTI-VITAMIN OR TABS Take 1 tablet by mouth daily 30 0     ondansetron (ZOFRAN ODT) 4 MG ODT tab Take 4 mg by mouth At Bedtime       pantoprazole (PROTONIX) 40 MG EC tablet Take 40 mg by mouth daily as needed for heartburn       polyethylene glycol (MIRALAX) 17 GM/Dose powder Take 17 g by mouth daily as needed for constipation 510 g      senna-docusate (SENOKOT-S/PERICOLACE) 8.6-50 MG tablet Take 1-2 tablets by mouth 2 times daily as needed for constipation 30 tablet 0     simvastatin (ZOCOR) 20 MG tablet Take 1 tablet (20 mg) by mouth At Bedtime 90 tablet 1     VITAMIN D, CHOLECALCIFEROL, PO Take 1,000 Units by mouth daily.            Controlled medications:   not applicable/none     Past Medical History:   Past Medical History:   Diagnosis Date     Cellulitis and abscess of trunk 6/27/2017     Depression      Hyperlipidemia LDL  "goal <100 10/31/2010     Hypertension goal BP (blood pressure) < 140/90 3/17/2011     Hypothyroidism 1/12/2010     Morbid obesity due to excess calories (H) 1/12/2010     Neuropathy in diabetes (H) 1/12/2010     Obesity 1/12/2010     Sleep apnea 1/12/2010    CPAP     Type 2 diabetes, HbA1C goal < 8% (H) 3/8/2011     Physical Exam:   Vitals: /78   Pulse 61   Temp 97.7  F (36.5  C)   Resp 19   Ht 1.651 m (5' 5\")   Wt 105.7 kg (233 lb)   SpO2 98%   BMI 38.77 kg/m    BMI: Body mass index is 38.77 kg/m .  Physical Exam  Constitutional:       General: He is not in acute distress.     Appearance: Normal appearance.   HENT:      Head: Normocephalic and atraumatic.   Eyes:      General: No scleral icterus.  Cardiovascular:      Rate and Rhythm: Normal rate and regular rhythm.      Heart sounds: No murmur heard.  Pulmonary:      Effort: Pulmonary effort is normal.      Breath sounds: No wheezing.   Musculoskeletal:      Right lower leg: No edema.      Left lower leg: No edema.   Skin:     General: Skin is warm and dry.   Neurological:      Mental Status: He is alert.           SNF labs: Recent labs in [facility] reviewed by me today.  and   Most Recent 3 CBC's:Recent Labs   Lab Test 01/11/23  0733 01/09/23  0825 01/08/23  0549   WBC 4.9 5.4 7.0   HGB 9.1* 9.2* 8.3*   MCV 88 88 86    359 357     Most Recent 3 BMP's:Recent Labs   Lab Test 01/16/23  1011 01/11/23  0733 01/09/23  1128 01/09/23  0829 01/09/23  0825   * 129*  --   --  124*   POTASSIUM 5.1 4.8  --   --  4.9   CHLORIDE 88* 95*  --   --  91*   CO2 21* 22  --   --  25   BUN 33.9* 22.6  --   --  17.8   CR 1.30* 1.24*  1.24*  --   --  1.20*   ANIONGAP 17* 12  --   --  8   CHAR 10.0 9.2  --   --  8.9   * 146* 196*   < > 147*    < > = values in this interval not displayed.     Most Recent 3 Hemoglobins:Recent Labs   Lab Test 01/11/23  0733 01/09/23  0825 01/08/23  0549   HGB 9.1* 9.2* 8.3*       DISCHARGE PLAN:    Follow up labs: BMP appointment " 1/19. Results to Lisa Pierre      Medical Follow Up:      Follow up with primary provider and infectious disease as scheduled    Discharge Services: Home Care:  Occupational Therapy, Physical Therapy, Registered Nurse and Home Health Aide    Discharge Instructions Verbalized to Patient at Discharge:     Monitor blood glucose monitoring 4 times a day. Keep a record and bring it to your next primary provider visit.     Continue to follow your diet:  1,500ml fluid restriction.     TOTAL DISCHARGE TIME:   Greater than 30 minutes  Electronically signed by:  Maribel Nuñez PA-C     Documentation of Face to Face and Certification for Home Health Services    I certify that patient: Steve Morgan is under my care and that I, or a nurse practitioner or physician's assistant working with me, had a face-to-face encounter that meets the physician face-to-face encounter requirements with this patient on: 1/17/2023.    This encounter with the patient was in whole, or in part, for the following medical condition, which is the primary reason for home health care:   1. Hyponatremia    2. Depression, unspecified depression type    3. Tenosynovitis of hand    4. Finger osteomyelitis, right (H)    5. Stage 3 chronic kidney disease, unspecified whether stage 3a or 3b CKD (H)    6. Anemia, unspecified type    7. Type 2 diabetes mellitus with diabetic neuropathy, with long-term current use of insulin (H)    8. Benign essential hypertension    9. Physical deconditioning      .    I certify that, based on my findings, the following services are medically necessary home health services: Nursing, Occupational Therapy and Physical Therapy.    My clinical findings support the need for the above services because: Nurse is needed: For complex aftercare of surgical procedures because the patient needs instruction and cannot perform care on their own due to: recent finger amputation.., Occupational Therapy Services are needed to assess  and treat cognitive ability and address ADL safety due to impairment in recent finger amputation. and Physical Therapy Services are needed to assess and treat the following functional impairments: recent finger amputation.    Further, I certify that my clinical findings support that this patient is homebound (i.e. absences from home require considerable and taxing effort and are for medical reasons or Faith services or infrequently or of short duration when for other reasons) because: Requires assistance of another person or specialized equipment to access medical services because patient: Requires supervision of another for safe transfer...    Based on the above findings. I certify that this patient is confined to the home and needs intermittent skilled nursing care, physical therapy and/or speech therapy.  The patient is under my care, and I have initiated the establishment of the plan of care.  This patient will be followed by a physician who will periodically review the plan of care.  Physician/Provider to provide follow up care: Lisa Pierre    Attending [facility] physician (the Medicare certified PECOS provider): Dr. Dione Etienne DO    Physician Signature: See electronic signature associated with these discharge orders.  Date: 1/17/2023                  Sincerely,        Maribel Nuñez PA-C

## 2023-01-17 NOTE — TELEPHONE ENCOUNTER
Maribel Nuñez PA-C James, Courtney N, PA-C; Cindi Meraz MD FYI   Mr. Morgan is d/c from TCU tomorrow     With regards to his NA. I have order outpatient BMP on Thurs to verify it is stable before the weekend as homecare could not guarantee they could be out to start service this week. Lisa I will defer to you if he needs a repeat BMP prior to his follow up 1/31     Dr. Meraz he has some hypoglycemia on TCU admission so his glipizide was stopped. Lantus at one point reduced to 18 units but now back up to 22 on discharge     Thanks!

## 2023-01-18 ENCOUNTER — TELEPHONE (OUTPATIENT)
Dept: FAMILY MEDICINE | Facility: CLINIC | Age: 63
End: 2023-01-18

## 2023-01-18 DIAGNOSIS — E11.40 TYPE 2 DIABETES MELLITUS WITH DIABETIC NEUROPATHY, WITH LONG-TERM CURRENT USE OF INSULIN (H): ICD-10-CM

## 2023-01-18 DIAGNOSIS — E78.5 HYPERLIPIDEMIA LDL GOAL <100: ICD-10-CM

## 2023-01-18 DIAGNOSIS — Z79.4 TYPE 2 DIABETES MELLITUS WITH DIABETIC NEUROPATHY, WITH LONG-TERM CURRENT USE OF INSULIN (H): ICD-10-CM

## 2023-01-18 RX ORDER — FLASH GLUCOSE SENSOR
KIT MISCELLANEOUS
Qty: 2 EACH | Refills: 11 | Status: SHIPPED | OUTPATIENT
Start: 2023-01-18 | End: 2023-05-04

## 2023-01-18 NOTE — TELEPHONE ENCOUNTER
Patient reports prior authorization is needed for Urea (URE-NA) 15 g PACK packet medication.     Routing to GURPREET GARCIA RN, BSN, PHN  [facility]

## 2023-01-18 NOTE — TELEPHONE ENCOUNTER
Central Prior Authorization Team - Phone: [phone]     PA Initiation    Medication: UREA (URE-NA) URGENT- PA INITIATED  Insurance Company:    Pharmacy Filling the Rx: [id number] [place], [place] - [address] [address].  Filling Pharmacy Phone: [phone]  Filling Pharmacy Fax:    Start Date: 1/18/2023

## 2023-01-18 NOTE — TELEPHONE ENCOUNTER
Central Prior Authorization Team - Phone: [phone]     PRIOR AUTHORIZATION DENIED    Medication: UREA (URE-NA) URGENT- PA DENIED    Denial Date: 1/18/2023    Denial Rational:                                   Appeal Information:  If the provider would like to appeal, please provide a letter of medical necessity and route back to the team. Otherwise you can close the encounter. Thank you, Central PA Team

## 2023-01-18 NOTE — TELEPHONE ENCOUNTER
Called Mariangel to find out if RN could draw BMP lab during visit and to confirm patient will be seen tomorrow. Mariangel reports she will need to connect with  to confirm they are able to see patient (may not be until Friday) and if labs can be drawn during visit.     Direct number provided for this RN to return call.     Yudith GARCIA RN, BSN, PHN - PAL (patient advocate liaison)  [facility]  [phone]

## 2023-01-18 NOTE — TELEPHONE ENCOUNTER
Lisa Pierre PA-C-    RN from [facility] is scheduled to go to patients home tomorrow at 10:00am and lab draw will be done during this visit.     Patient is out of sensors for freestyle juan. Advised rx sent on 1/4/23. Patient spoke to [facility] and they have no record of rx. Patient can contact Dr. Kendall if needed.   -Would you be willing to resend? RN pended.     Yudith GARCIA RN, BSN, PHN - PAL (patient advocate liaison)  [facility]  [phone]

## 2023-01-18 NOTE — TELEPHONE ENCOUNTER
Mariangel ramos from [facility] [phone].  They received referral from [facility].  They are wondering who his provider is for home care orders.  He is discharging today.  Advised his primary provider is Lsia Pierre PA-C.  Opening to home care for skilled nurse, OT and PT.  They will likely be out to see him tomorrow.  Needs BMP per Yudith.  FYI to provider.  Daisy Bradley RN

## 2023-01-18 NOTE — TELEPHONE ENCOUNTER
Patient called and is wondering if paperwork for his reader was filled out and sent to Social Security. Patient requesting a call back to discuss further with care team.

## 2023-01-18 NOTE — TELEPHONE ENCOUNTER
"Community paramedics are unable to go to patients home until Monday, 1/23. Spoke to patients wife and she is agreeable to bring patient for lab only tomorrow, if absolutely necessary. Waiting to hear from home care on when they will be able to see patient. Will route to provider if do not hear from home care by 3:00pm.    Spoke to Sri (C2C)-  Sri reports she has not had an update regarding home care but as far as she knows they will not be able to start until next week.  Sri states \"This has been so difficult\". Sri is trying to continue to work while providing support. She has requested to work from home through January. If it is absolutely needed, she can bring patient in for lab only appointment.     Sri reports there is one medication that may not be covered by insurance and they can not afford to pay out of pocket. She was unsure what medication it was. Advised to have pharmacy submit a prior authorization request to prescribing provider.     Sri reports patient is out of freestyle juan sensors. Advised rx sent 1/4/23. Sri believes they tried to pick these up at the end of December. Sri will check with pharmacy and call this RN back with any questions or concerns, direct number provided.     Informed patient is scheduled for in person follow-up with Lisa on 1/31/23 at 7:40am.     Spoke to Caio:  Informed waiting to hear when home care will be able to go to patients home to advise if lab visit is needed in clinic tomorrow. Informed that community paramedics can not get to patients home and will not do visit if home care is set up. Patient verbalized understanding.     Patient reports there is a medication that may not be covered by his insurance and is unable to afford the medication. Patient reports it is for low sodium. Chart review, it may be Urea. PA request routed to GURPREET bardales.    Yudith GARCIA RN, BSN, PHN - PAL (patient advocate liaison)  [facility]  [phone]

## 2023-01-18 NOTE — TELEPHONE ENCOUNTER
Mariangel called back to confirm lab can be drawn during nursing visit. Mariangel requested to know what color tube would be needed.    Spoke to lab and was informed green top tube was needed. Left detailed message with Mariangel to inform of tube color and clarify date of nursing visit.     Huddled with Lisa Pierre PA-C to inform lab can be drawn by home care. Lisa advised lab could wait to be done Friday if RN visit is scheduled for Friday. Spoke to Mariangel (Ann) who reports nursing visit is set up for tomorrow around 10:00 am. RN informed Dina Pedraza ([facility]) that community paramedics are not needed at this time.     Left message for patient to return call to this RN.     Yudith GARCIA RN, BSN, PHN - PAL (patient advocate liaison)  [facility]  [phone]

## 2023-01-19 ENCOUNTER — LAB (OUTPATIENT)
Dept: LAB | Facility: CLINIC | Age: 63
End: 2023-01-19
Payer: MEDICARE

## 2023-01-19 ENCOUNTER — MEDICAL CORRESPONDENCE (OUTPATIENT)
Dept: HEALTH INFORMATION MANAGEMENT | Facility: CLINIC | Age: 63
End: 2023-01-19

## 2023-01-19 DIAGNOSIS — Z79.4 TYPE 2 DIABETES MELLITUS WITH DIABETIC NEUROPATHY, WITH LONG-TERM CURRENT USE OF INSULIN (H): ICD-10-CM

## 2023-01-19 DIAGNOSIS — R79.89 ELEVATED SERUM CREATININE: ICD-10-CM

## 2023-01-19 DIAGNOSIS — E11.40 TYPE 2 DIABETES MELLITUS WITH DIABETIC NEUROPATHY, WITH LONG-TERM CURRENT USE OF INSULIN (H): ICD-10-CM

## 2023-01-19 DIAGNOSIS — E87.1 HYPONATREMIA: ICD-10-CM

## 2023-01-19 LAB
ANION GAP SERPL CALCULATED.3IONS-SCNC: 15 MMOL/L (ref 7–15)
BUN SERPL-MCNC: 29.6 MG/DL (ref 8–23)
CALCIUM SERPL-MCNC: 9.5 MG/DL (ref 8.8–10.2)
CHLORIDE SERPL-SCNC: 91 MMOL/L (ref 98–107)
CREAT SERPL-MCNC: 1.23 MG/DL (ref 0.67–1.17)
DEPRECATED HCO3 PLAS-SCNC: 20 MMOL/L (ref 22–29)
GFR SERPL CREATININE-BSD FRML MDRD: 66 ML/MIN/1.73M2
GLUCOSE SERPL-MCNC: 306 MG/DL (ref 70–99)
POTASSIUM SERPL-SCNC: 5.6 MMOL/L (ref 3.4–5.3)
SODIUM SERPL-SCNC: 126 MMOL/L (ref 136–145)

## 2023-01-19 PROCEDURE — 80048 BASIC METABOLIC PNL TOTAL CA: CPT

## 2023-01-19 PROCEDURE — 36415 COLL VENOUS BLD VENIPUNCTURE: CPT

## 2023-01-19 RX ORDER — SIMVASTATIN 20 MG
TABLET ORAL
Qty: 90 TABLET | Refills: 1 | OUTPATIENT
Start: 2023-01-19

## 2023-01-19 NOTE — TELEPHONE ENCOUNTER
Already approved. simvastatin (ZOCOR) 20 MG tablet 90 tablet 1 12/20/2022. Should have refills on file.     Cara Marrero RN   PAL (Patient Advocate Liason)  [facility]

## 2023-01-19 NOTE — TELEPHONE ENCOUNTER
Spoke to pt- pt states the form was sent from [facility] to his pcp. Pt will discuss with pcp if they are filling the form out. If not pt will have them forward to [facility].

## 2023-01-19 NOTE — TELEPHONE ENCOUNTER
Per Pharmacy:   The below order as prescribed will require a Medical record validation under the patient's Medicare B benefit. To prevent delay in dispensing, your assistance is appreciated to confirm patient eligibility for continuous Glucose therapy per Medicare guidelines, A medical record collection form will be sent out in the next 48 hours. Please fax supporting documentation to   [phone].    Freestyle Nina [address] [place]

## 2023-01-20 ENCOUNTER — TELEPHONE (OUTPATIENT)
Dept: FAMILY MEDICINE | Facility: CLINIC | Age: 63
End: 2023-01-20

## 2023-01-20 ENCOUNTER — TELEPHONE (OUTPATIENT)
Dept: FAMILY MEDICINE | Facility: CLINIC | Age: 63
End: 2023-01-20
Payer: MEDICARE

## 2023-01-20 DIAGNOSIS — E11.40 TYPE 2 DIABETES MELLITUS WITH DIABETIC NEUROPATHY, WITH LONG-TERM CURRENT USE OF INSULIN (H): ICD-10-CM

## 2023-01-20 DIAGNOSIS — E87.1 HYPONATREMIA: Primary | ICD-10-CM

## 2023-01-20 DIAGNOSIS — Z79.4 TYPE 2 DIABETES MELLITUS WITH DIABETIC NEUROPATHY, WITH LONG-TERM CURRENT USE OF INSULIN (H): ICD-10-CM

## 2023-01-20 NOTE — TELEPHONE ENCOUNTER
Huddled with Dr. Fowler, will route encounter back as requested.     Yudith GARCIA RN, BSN, PHN  [facility]

## 2023-01-20 NOTE — TELEPHONE ENCOUNTER
Please call Caio, see how's he doing, if there is change in his symptoms  If no change, then repeat labs on Monday please.  Potassium is high, I will keep losartan at this time, but if Potassium remains high, may have to cut it down.  Sodium is still low, stable from previous.  Avoid high potassium food, Here are some the food that contain potassium:  Cantaloupe, watermelons, grapefruit, all dried fruit and fruit juices, avocadoes, tomatoes, potatoes (plain and sweet), Tilden sprouts, milk, yogurt, lentils, and most nuts (except peanuts).

## 2023-01-20 NOTE — TELEPHONE ENCOUNTER
Sammy higgins, this showed up in my inbasket from [facility], are we following this patient?

## 2023-01-20 NOTE — TELEPHONE ENCOUNTER
The Home Care/Assisted Living/Nursing Facility is calling regarding an established patient.  Has the patient seen Home Care in the past or is currently residing in Assisted Living or Nursing Facility? Yes.     Lorenzo PT calling from [facility] requesting the following orders that are within the Home Care, Assisted Living or Nursing Home Eval and Treatment standing order and can be signed as standing order signature required by RN.    Preferred Call Back Number: [phone]    PT/OT/Speech Therapy  PT visits twice weekly for 2 weeks, then once weekly for 6 weeks    Any additional Orders:  Are there any orders requested, not stated above, that are outside of the standing order and must be routed to a licensed practitioner for approval?    No    Writer has verified Requestor will send fax to have orders signed.    Verbal orders given.    Routing to PCP as an FYI  Amee Alexandre RN

## 2023-01-20 NOTE — TELEPHONE ENCOUNTER
Dr. Tianna Gonzalez is following patient but is out of clinic until Tuesday, 1/24. She cc'd you on labs to review NA to verify it is stable prior to the weekend.     1/7/23-Patient readmitted to hospital from TCU due to hyponatremia.  Discharge diagnosis:   Acute on Chronic Hyponatremia  - serum osmolality 270, urine sodium 48, and urine osmolality 288 -> consistent with SIADH  - Repeat CXR negative  - Baseline Sodium appears to be upper 120s-low 130s  - Fluid restriction 1500 mL  - AM cortisol 12.1.  Recommend outpatient ACTH stimulation test with his Endocrinologist  - Triglyceride level 99  - Plan to taper celexa and bupropion discussed with pt.  Plan to cut dose every week until tapered off.  We discussed that if his depression increases, he could consider cymbalta.  We also discussed that if he tapers off the medications and his hyponatremia continues, then he could consider restarting celexa and bupropion as they are likely not the cause  - Appreciate Nephrology recommendations    Lisa Pierre PA-C        3:02 PM  Note  Maribel Nuñez PA-C James, Courtney N, PA-C; Cindi Meraz MD FYI   Mr. Morgan is d/c from TCU tomorrow     With regards to his NA. I have order outpatient BMP on Thurs to verify it is stable before the weekend as homecare could not guarantee they could be out to start service this week. Lisa I will defer to you if he needs a repeat BMP prior to his follow up 1/31          Yudith GARCIA RN, BSN, PHN - PAL (patient advocate liaison)  [facility]  [phone]

## 2023-01-20 NOTE — TELEPHONE ENCOUNTER
Dr. Fowler:    -Patient was advised to continue taking Urea (URE-NA) through 1/27/23, a 30 day rx was sent to pharmacy on 1/17/23. Patient picked up medication. Patient would like to know how long to continue medication.    Spoke to patient and informed of Dr. Fowler's note and recommendations below. Patient reports he is not experiencing any changes in symptoms at this time.Patient asked if he could drink milk. Advised patient to avoid at this time per Dr. Fowler's recommendation. Patient verbalized understanding and is agreeable to plan.   -Scheduled patient for repeat BMP Monday. This RN will call [facility] to find out if RN visit is possible for Monday    [facility] [phone], direct number provided to return call.     -Patient was advised to continue taking Urea (URE-NA) through 1/27/23, a 30 day rx was sent to pharmacy on 1/17/23. Patient would like to know how long to continue medication.  -Patient informed that he was currently tapering off of buproprion and citalopram and was thinking about stopping medications now. Advised patient to complete taper due to adverse effects from stopping abruptly. Patient verbalized understanding and will continue taper.   -Patient reports he has not heard back regarding glucose sensors. Informed patient we have not received form at this time. This RN will check with TC to see if form received today.     Yudith GARCIA RN, BSN, PHN - PAL (patient advocate liaison)  [facility]  [phone]

## 2023-01-20 NOTE — TELEPHONE ENCOUNTER
"Lisa Pierre PA-C-    1. Rx Benefit Exception Request form received. Placed in [facility] for completion.--given to RN    2. Patient was advised to continue taking Urea (URE-NA) through 1/27/23, a 30 day rx was sent to pharmacy on 1/17/23. Patient picked up medication. Patient would like to know how long to continue medication.given sodium is improving. I think okay to take for 3 weeks total. (21 days total).     3. Wife is requesting referral to nutritionist to discuss patients recommended diet. \"Needs help with what he can and can't eat & needs to lose weight\" would like nutritionist to help him make a meal plan. RN pended--  Nutrition   [address] [facility] [address]   [facility] [address]   Phone: [phone]     Patient will need to call to set up appt. Please given phone #    FYI:  Called patient to inform Rxbenefit exception form received from [facility]. Patient requests I speak with wife \"I can't remember most of the time\" so would like RN to update with what is going on. Patient Denies headache, denies confusion \"I feel great\". RN reviewed Dr. Fowler's note below. Sri verbalized understanding.    Spoke to Suki, clinical supervisor at [facility] and asked if RN could go to patients home on Monday for lab draw. Patient intake is not completely processed so it is very unlikely that RN will be able to return on Monday.   Fax number for orders: [phone]. Called patient to inform lab would need to be performed in clinic on Monday. Patient verbalized understanding.     Yudith GARCIA RN, BSN, PHN - PAL (patient advocate liaison)  [facility]  [phone]    "

## 2023-01-21 ENCOUNTER — LAB REQUISITION (OUTPATIENT)
Dept: LAB | Facility: CLINIC | Age: 63
End: 2023-01-21
Payer: MEDICARE

## 2023-01-21 DIAGNOSIS — L03.113 CELLULITIS OF RIGHT UPPER LIMB: ICD-10-CM

## 2023-01-23 ENCOUNTER — LAB (OUTPATIENT)
Dept: LAB | Facility: CLINIC | Age: 63
End: 2023-01-23
Payer: MEDICARE

## 2023-01-23 ENCOUNTER — TELEPHONE (OUTPATIENT)
Dept: FAMILY MEDICINE | Facility: CLINIC | Age: 63
End: 2023-01-23

## 2023-01-23 DIAGNOSIS — E87.1 HYPONATREMIA: ICD-10-CM

## 2023-01-23 LAB
ANION GAP SERPL CALCULATED.3IONS-SCNC: 12 MMOL/L (ref 7–15)
BUN SERPL-MCNC: 25.7 MG/DL (ref 8–23)
CALCIUM SERPL-MCNC: 8.6 MG/DL (ref 8.8–10.2)
CHLORIDE SERPL-SCNC: 101 MMOL/L (ref 98–107)
CREAT SERPL-MCNC: 1.31 MG/DL (ref 0.67–1.17)
DEPRECATED HCO3 PLAS-SCNC: 19 MMOL/L (ref 22–29)
GFR SERPL CREATININE-BSD FRML MDRD: 61 ML/MIN/1.73M2
GLUCOSE SERPL-MCNC: 206 MG/DL (ref 70–99)
POTASSIUM SERPL-SCNC: 4.9 MMOL/L (ref 3.4–5.3)
SODIUM SERPL-SCNC: 132 MMOL/L (ref 136–145)

## 2023-01-23 PROCEDURE — 80048 BASIC METABOLIC PNL TOTAL CA: CPT

## 2023-01-23 PROCEDURE — 36415 COLL VENOUS BLD VENIPUNCTURE: CPT

## 2023-01-23 NOTE — TELEPHONE ENCOUNTER
The Home Care/Assisted Living/Nursing Facility is calling regarding an established patient.  Has the patient seen Home Care in the past or is currently residing in Assisted Living or Nursing Facility? Yes.     Kell calling from [facility] requesting the following orders that are within the Home Care, Assisted Living or Nursing Home Eval and Treatment standing order and can be signed as standing order signature required by RN.    Preferred Call Back Number: [phone]    PT/OT/Speech Therapy-VERBAL ORDERS PROVIDED FOR OT TWICE A WEEK X 2 WEEKS AND ONCE A WEEK X 3 WEEKS, FYI TO CJ    Any additional Orders:  Are there any orders requested, not stated above, that are outside of the standing order and must be routed to a licensed practitioner for approval?    No    Writer has verified Requestor will send fax to have orders signed.  Lucía Bond RN, BSN  [facility]

## 2023-01-24 NOTE — TELEPHONE ENCOUNTER
He does not have to avoid high potassium foods, but he should LIMIT them.   I do recommend continuing the fluid restriction this will help with the sodium.     Will insurance/pharmacy give him some sensors until he can be seen ?

## 2023-01-24 NOTE — TELEPHONE ENCOUNTER
Message left to return call to this RN PAL. Please transfer if available.    Direct number left for this RN PAL on message for return call.     Yudith GARCIA RN, BSN, PHN, PAL (patient advocate liaison)   [facility]  [phone]

## 2023-01-24 NOTE — TELEPHONE ENCOUNTER
Spoke to patient and informed of below. Patient will check with insurance and pharmacy to see if they can provide sensors while we wait for appointment, response from form.    Yudith GARCIA RN, BSN, PHN - PAL (patient advocate liaison)  [facility]  [phone]

## 2023-01-24 NOTE — TELEPHONE ENCOUNTER
Lisa Pierre PA-C-     Do you recommend patient continue to avoid high potassium foods? Should he maintain fluid restriction?     RN faxed form. Patient reports he is taking 22 units of Lantus insulin at bedtime, changed during hospital admission from 30 units per Dr. Kendall. He is unable to monitor blood glucose as he is out of sensors.    Nutrition information provided. Patient will call to schedule an appointment with a nutritionist.     Yudith GARCIA RN, BSN, PHN - PAL (patient advocate liaison)  [facility]  [phone]

## 2023-01-25 ENCOUNTER — VIRTUAL VISIT (OUTPATIENT)
Dept: NUTRITION | Facility: CLINIC | Age: 63
End: 2023-01-25
Payer: MEDICARE

## 2023-01-25 DIAGNOSIS — N18.30 STAGE 3 CHRONIC KIDNEY DISEASE, UNSPECIFIED WHETHER STAGE 3A OR 3B CKD (H): ICD-10-CM

## 2023-01-25 DIAGNOSIS — E11.40 TYPE 2 DIABETES MELLITUS WITH DIABETIC NEUROPATHY, WITH LONG-TERM CURRENT USE OF INSULIN (H): ICD-10-CM

## 2023-01-25 DIAGNOSIS — E87.1 HYPONATREMIA: ICD-10-CM

## 2023-01-25 DIAGNOSIS — Z79.4 TYPE 2 DIABETES MELLITUS WITH DIABETIC NEUROPATHY, WITH LONG-TERM CURRENT USE OF INSULIN (H): ICD-10-CM

## 2023-01-25 PROCEDURE — 97802 MEDICAL NUTRITION INDIV IN: CPT | Mod: 95

## 2023-01-25 NOTE — PROGRESS NOTES
Medical Nutrition Therapy for Diabetes  Type of Service: Telephone Visit    Originating Location (Patient Location): Home  Distant Location (Provider Location): [facility]  Mode of Communication:  Telephone    Telephone Visit Start Time: 1:32pm  Telephone Visit End Time (telephone visit stop time): 2:31pm    How would patient like to obtain AVS? Mail a copy       Visit Type:Initial assessment and intervention    Steve Morgan presents today for MNT and education related to type 2 diabetes, weight management, chronic kidney disease and hyponatremia.   He is accompanied by self and spouse, Sri.     ASSESSMENT:   Patient comments/concerns relating to nutrition: Says he has diabetes as well as low sodium and high potassium.  He is also overweight so he wants to go over healthy meal planning. Wants to know what he should and should not be eating. Would like to go over a healthy diet perspective.  Says his mind gets boggled with what to eat for meals.     Says needs to avoid fruit and nuts. He was told can eat berries. He has to watch high potassium foods.   Takes 22 units of Lantus at bedtime. Says also taking Metformin and Glipizide. Needs to buy more glucose tablets. Says he was told to limit fluids to 50 oz a day.  Says prior to this, would have 2-3 large pops a day. He quit drinking pop due to not feeling well.     Says he rejoined [employer] and was successful on this in the past. Says currently does not do anything with this.     Taking diabetes medications?      Diabetes Medication(s)     Biguanides       metFORMIN (GLUCOPHAGE) 1000 MG tablet    Take 1 tablet (1,000 mg) by mouth 2 times daily (with meals)    Insulin       insulin glargine (LANTUS VIAL) 100 UNIT/ML vial    Inject 22 Units Subcutaneous At Bedtime            NUTRITION HISTORY:    Breakfast: Cereal (Bran flakes) with milk OR none  Lunch: 12-12:30pm: ham sandwich and chips, carrots OR 3 cups tuna noodle  salad  Dinner: Frozen chicken kiev, green beans and salad with cucumbers and croutons and salad dressing (big salad) OR 5-6oz fish, green beans and salad.   Snacks: popcorn, candy cane, candy OR Sleeve of crackers and Velveeta cheese  Overnight hypoglycemia- treated with PB bars, 2 cups OJ and cookies  Beverages: Water 50oz/day    Misses meals? none  Eats out:  1-2 meals/per week     Previous diet education:  Yes with DSMT    Food allergies/intolerances: none noted    Diet is high in: calories and carbs  Diet is low in: calcium, fiber, fruits and vegetables    EXERCISE: Doing physical and occupational therapy and has a recumbent bike to be delivered.     SOCIO/ECONOMIC:   Lives with: spouse    BLOOD GLUCOSE MONITORING:  Patient glucose self monitoring as follows: never.   BG meter: Nina 14 day meter and sensor but trying to get the Nina 2  BG results: not available - does not currently have a sensor and is not checking blood glucose.     BG values are: unable to assess  Patient's most recent   Lab Results   Component Value Date    A1C 9.3 01/07/2023    A1C 7.4 02/22/2021        MEDICATIONS:  Current Outpatient Medications   Medication     acetaminophen (TYLENOL) 325 MG tablet     ASPIRIN 81 MG OR TABS     atenolol (TENORMIN) 25 MG tablet     buPROPion (WELLBUTRIN) 100 MG tablet     Calcium Carb-Cholecalciferol (CALCIUM 600 + D PO)     cephALEXin (KEFLEX) 500 MG capsule     citalopram (CELEXA) 20 MG tablet     Continuous Blood Gluc  (FREESTYLE NINA 2 READER) SIENA     Continuous Blood Gluc Sensor (FREESTYLE NINA 14 DAY SENSOR) MISC     Cyanocobalamin (VITAMIN B 12 PO)     famotidine (PEPCID) 40 MG tablet     ferrous sulfate 325 (65 FE) MG tablet     finasteride (PROSCAR) 5 MG tablet     fluticasone (FLONASE) 50 MCG/ACT nasal spray     ibuprofen (ADVIL/MOTRIN) 200 MG tablet     insulin glargine (LANTUS VIAL) 100 UNIT/ML vial     latanoprost (XALATAN) 0.005 % ophthalmic solution     LEVOXYL 137 MCG tablet      losartan (COZAAR) 100 MG tablet     metFORMIN (GLUCOPHAGE) 1000 MG tablet     MULTI-VITAMIN OR TABS     ondansetron (ZOFRAN ODT) 4 MG ODT tab     pantoprazole (PROTONIX) 40 MG EC tablet     polyethylene glycol (MIRALAX) 17 GM/Dose powder     senna-docusate (SENOKOT-S/PERICOLACE) 8.6-50 MG tablet     simvastatin (ZOCOR) 20 MG tablet     Urea (URE-NA) 15 g PACK packet     VITAMIN D, CHOLECALCIFEROL, PO     No current facility-administered medications for this visit.       LABS:  Lab Results   Component Value Date    A1C 9.3 01/07/2023    A1C 7.4 02/22/2021     Lab Results   Component Value Date     01/23/2023     01/09/2023     09/24/2021     01/16/2020     Lab Results   Component Value Date    LDL 64 01/08/2023    LDL 88 03/05/2020     HDL Cholesterol   Date Value Ref Range Status   03/05/2020 33 (L) >39 mg/dL Final     Direct Measure HDL   Date Value Ref Range Status   01/08/2023 30 (L) >=40 mg/dL Final   ]  GFR Estimate   Date Value Ref Range Status   01/23/2023 61 >60 mL/min/1.73m2 Final     Comment:     eGFR calculated using 2021 CKD-EPI equation.   01/16/2020 79 >60 mL/min/[1.73_m2] Final     Comment:     Non  GFR Calc  Starting 12/18/2018, serum creatinine based estimated GFR (eGFR) will be   calculated using the Chronic Kidney Disease Epidemiology Collaboration   (CKD-EPI) equation.       GFR Estimate If Black   Date Value Ref Range Status   01/16/2020 >90 >60 mL/min/[1.73_m2] Final     Comment:      GFR Calc  Starting 12/18/2018, serum creatinine based estimated GFR (eGFR) will be   calculated using the Chronic Kidney Disease Epidemiology Collaboration   (CKD-EPI) equation.       Lab Results   Component Value Date    CR 1.31 01/23/2023    CR 1.01 01/16/2020     No results found for: MICROALBUMIN    ANTHROPOMETRICS:  Vitals: There were no vitals taken for this visit.  Estimated body mass index is 38.77 kg/m  as calculated from the following:     "Height as of 1/17/23: 1.651 m (5' 5\").    Weight as of 1/17/23: 105.7 kg (233 lb).       Wt Readings from Last 5 Encounters:   01/17/23 105.7 kg (233 lb)   01/11/23 105.9 kg (233 lb 6.4 oz)   01/09/23 107.6 kg (237 lb 4.8 oz)   01/06/23 108.9 kg (240 lb)   01/02/23 110.7 kg (244 lb)       Weight Change: unable to assess- no new weight today but appears to be losing weight per prior January weights.    ESTIMATED KCAL REQUIREMENTS:  2136 kcal per day (based on last clinic weight from 1/17/23)    NUTRITION DIAGNOSIS: Food- and nutrition-related knowledge deficit related to eating for diabetes, low potassium and fluid restriction as evidenced by patient stating he is not sure what is ok to eat for all his conditions and to aid weight loss.    NUTRITION INTERVENTION:  Nutrition Prescription: Energy Intake: 2778-7984 kcal/day for weight loss  Carbohydrate Intake: 30-60 grams/meal and 15-30 grams/snacks  Education given to support: general nutrition guidelines, weight reduction, consistent meals, free foods, carb counting, labeling, exercise, fiber, behavior modification, portion control and low-potassium foods  Education Materials Provided: 100 Calorie Snacks, Potassium Content of Foods, Carbohydrate Counting, and Sample 4 Carbohydrate Meal Ideas on AVS  Motivational Interviewing    Discussion: Reviewed diet recall and answered patient's questions about low potassium foods he can eat more of in diet and the higher potassium foods to limit such as meat quantity (lower to 3 oz at a meal for both limiting potassium but also for CKD), and to continue to adhere to fluid intake through beverages and soup but for right now, will not worry about small amount of fluid from fruits and vegetables unless hyponatremia continues to be an issue as patient already having trouble keeping fluids to 50oz/day and he says it was suspected that medications contributed. Since yesterday, it was estimated he consumed 8 servings of high potassium " foods, suggested he try limiting to 4 servings a day (1-2 from meat and 1-2 of other high potassium fruits and vegetables) and see how this works for him.     Reviewed carbohydrate-containing foods and suggested plate method for simplicity on balancing meals. Since patient is eating larger portions, suggested lowering portions of grains and meat and adding more low-potassium fruits and vegetables at meals for weight loss. Informed Caio that most men do well on 30-60 gm carbohydrate for meals and 15-30 gm carbohydrate for snacks. Recommended pre-portioning snacks to see if this helps with eating less for snacks. He requested a sample meal plan so will send some 4 carbohydrate choice (60 gm) meal ideas that he can substitute lower-potassium fruits and vegetables. Pt verbalized understanding of concepts discussed and recommendations provided.       PATIENT'S BEHAVIOR CHANGE GOALS:   See Patient Instructions for patient stated behavior change goals. AVS was printed and mailed.    MONITOR / EVALUATE:  RD will monitor/evaluate:  Blood Glucose / A1c  Food / Beverage / Nutrient intake   Pertinent Labs  Progress toward meeting stated nutrition-related goals  Weight change    FOLLOW-UP:  Call RD with questions/concerns.   Follow-up appointment scheduled on 2/10/23.     Loli Olivia RDN, LD, CDCES   Time spent in minutes: 59 minutes  Encounter: Individual telephone visit

## 2023-01-25 NOTE — PATIENT INSTRUCTIONS
Try to limit high potassium foods to 4 servings a day (meat = 1-2 servings and 2-3 other high potassium food servings).  -Try to keep the rest low-potassium foods  -Limit meat to 3 oz at a meal (deck of cards)  -High potassium = >200 mg/serving  Low potassium = <200 mg/serving    2. Plate Method at meals:  1/2 plate veggies OR 1/4 plate veggies and 1/4 plate fruit (raw, frozen and canned if rinsed or are no-salt added/low-sugar are all fine)  1/4 plate lean meat (3-4 oz)  1/4 plate grains, ideally whole grains when able (1/2-1 cup rice/pasta/other grains/potatoes OR 1-2 slices bread)    Helpful Website: www.myplate.gov    OR     Carbohydrate needs: 30-60 gm at meals and 15-30 gm for snacks.     3. Aiming for 8002-3465 calories a day for weight loss.    4. Continue to be active -makes weight loss easier.     5. Great idea to buy glucose tablets for treating low blood glucose- lot less potassium than 2 cups of OJ!    5. Helpful websites:    -American Diabetes Association (www.diabetes.org). Check out Diabetes Food Hub for recipe ideas (https://www.diabetesfoodhub.org)  -Siria (will have more lower-potassium recipe ideas)  -Calorieking.com (will list the potassium, carbohydrate and calories in food if not have a label or not listed)    FOLLOW UP APPOINTMENT: Will call you again around 8am on Friday, February 10th.    Loli Olivia RDN, LD, [facility]   [phone]    Sample 4 Carb Breakfast Choices -Carbohydrate choices found in (  )  1 cup of cooked cereal (2)   8 oz skim milk (1)  1 cup berries (1)  2 tablespoons of nuts (0)   1 small whole grain (store bought) bagel (2)  1 fried egg (0)  2 oz lean ham (0)  8 oz skim milk (1)  1 orange (1) 6 oz light yogurt (1)  1 cup berries (1)  2 slices whole grain toast (2)  1-2 Tablespoons peanut butter (0) 1 medium whole grain tortilla (2)  1 medium banana (2)  1 tablespoon peanut butter (0)    cup cottage cheese (0)   8 oz skim milk (1) + 1 packet instant breakfast mix (1)   1  whole grapefruit (2)   2 slices whole grain toast (2)  1 cup skim milk (1)    cup grapes (1)   1 cup cubed sweet potatoes (2)  1 cup skim milk (1)  1 slice whole grain toast (1) with 1 tsp butter (0)  1 scrambled egg (0) 1 whole grain English muffin (2)  1 medium banana (2)  1 Tablespoon peanut butter (0)       4 Carbohydrate Choice Meal Plans for Lunch or Supper   Carbohydrate choices found in (   )  1 cup whole grain spaghetti (3) +   cup low sodium pasta sauce (1) + 3 oz lean ground turkey (0) + 2 cups lettuce/veggies (0) + 2 Tbsp dressing (0) 2 slices of whole grain bread (2)  3 oz lean turkey (0) + lettuce/tomato (0)  2 tsp mitchell (0)  6 whole grain crackers (1)  1 small apple (1) 1 cup of low sodium broth based soup (1) +  2 slices of whole grain bread (2)   2 oz of low fat cheese (0) + 1 tsp butter (0)  Carrots/celery (0)    cup grapes (1) 2 cups lettuce salad (0) +   cup garbanzo beans (1) + +   cup tuna (0) + 2 Tablespoons low-fat vinaigrette salad dressing (0)  + 6 oz light yogurt (1) + 3 rye crackers (1) + 8 oz skim milk (1)    2 cups low sodium broth based soup (2) + 6 saltine crackers (1) + 1 small apple (1) + broccoli/cauliflower and low fat dip (0)   3 oz lean steak (0) + medium baked potato (2) + 1 tsp low fat sour cream (0) + 1 cup green beans (0) + 1 small dinner roll (1)   1 cup berries (1) + 1 tbsp light whipped cream (0) 2 cups lettuce salad (0)   3 oz grilled chicken (0)  1-2 Tsbp light Caesar dressing (0)  + 1 Tbps grated cheese (0)  1 cup grapes (2)  5 Triscuit crackers (1)  8 oz skim milk (1)   3 oz chicken (0)  1 cup sweet potato (2)  1 cup asparagus (0)  1 small apple (1)  16 oz sparkling water (unsweetened)  1-8 oz glass skim milk (1)   1 hamburger pavel (0) on hamburger bun (2) + small french fries (2) + + 1 garden salad (0) with 1 package of vinaigrette (0) + 1 cup baby carrots + 1/2 cup green beans- cooked (0) 2 slices whole grain bread (2)+ 2 oz lean ham (0) + leaf lettuce/tomato/onion  (0)  2 teaspoons mitchell (0)  1 medium pear (2)  1 cup raw veggie sticks (0) 3 slices thin crust pizza (3) + 2 cups lettuce salad (0) + 10 cherry tomatoes (0) + 2 Tbsp light salad dressing (0) + 1 small peach (1) 3-6  whole grain tortillas (3) +   cup fat free refried beans (1) + 3 oz shredded lean beef (0) + 2 Tsbp salsa (0)+ lettuce/tomato (0) + 1 Tbsp light sour cream (0)   1 cup low sodium chicken noodle soup (1) + 2 slices whole grain bread (2) + 2 oz lean turkey (0) + 1 oz low fat cheese (0) + 1-2 teaspoons of butter or margarine    cup peaches (1) 3 oz turkey (0) + 1 cup mashed potatoes (2) + 1 tsp butter (0) + 1 cup green beans (0) +   cup unsweetened apple sauce (1)  1-8oz  cup of skim milk (1) 3 oz chicken + 3 medium pierogis (3) + 1/3 cup cabbage (0) +   cup unsweetened applesauce (1) +   cup green beans (0) I cup of beef stroganoff (0) + 1 cup egg noodles (3) + 1 cup broccoli (0) + 1 cup carrots (0) + 1 cup of watermelon (1)

## 2023-01-26 ENCOUNTER — TRANSFERRED RECORDS (OUTPATIENT)
Dept: HEALTH INFORMATION MANAGEMENT | Facility: CLINIC | Age: 63
End: 2023-01-26

## 2023-01-27 ENCOUNTER — OFFICE VISIT (OUTPATIENT)
Dept: FAMILY MEDICINE | Facility: CLINIC | Age: 63
End: 2023-01-27
Payer: MEDICARE

## 2023-01-27 ENCOUNTER — TELEPHONE (OUTPATIENT)
Dept: FAMILY MEDICINE | Facility: CLINIC | Age: 63
End: 2023-01-27

## 2023-01-27 VITALS
HEIGHT: 66 IN | SYSTOLIC BLOOD PRESSURE: 108 MMHG | DIASTOLIC BLOOD PRESSURE: 68 MMHG | HEART RATE: 93 BPM | OXYGEN SATURATION: 97 % | WEIGHT: 237.6 LBS | BODY MASS INDEX: 38.18 KG/M2

## 2023-01-27 DIAGNOSIS — I73.9 PVD (PERIPHERAL VASCULAR DISEASE) (H): ICD-10-CM

## 2023-01-27 DIAGNOSIS — Z13.220 SCREENING FOR HYPERLIPIDEMIA: ICD-10-CM

## 2023-01-27 DIAGNOSIS — L98.491: ICD-10-CM

## 2023-01-27 DIAGNOSIS — M24.542 CONTRACTURE OF JOINT OF FINGER OF LEFT HAND: ICD-10-CM

## 2023-01-27 DIAGNOSIS — E03.4 HYPOTHYROIDISM DUE TO ACQUIRED ATROPHY OF THYROID: ICD-10-CM

## 2023-01-27 DIAGNOSIS — E66.01 MORBID OBESITY (H): ICD-10-CM

## 2023-01-27 DIAGNOSIS — M24.541 CONTRACTURE OF JOINT OF FINGER, RIGHT: ICD-10-CM

## 2023-01-27 DIAGNOSIS — Z79.4 TYPE 2 DIABETES MELLITUS WITH DIABETIC NEUROPATHY, WITH LONG-TERM CURRENT USE OF INSULIN (H): Primary | ICD-10-CM

## 2023-01-27 DIAGNOSIS — E11.40 TYPE 2 DIABETES MELLITUS WITH DIABETIC NEUROPATHY, WITH LONG-TERM CURRENT USE OF INSULIN (H): Primary | ICD-10-CM

## 2023-01-27 DIAGNOSIS — E87.1 CHRONIC HYPONATREMIA: ICD-10-CM

## 2023-01-27 LAB
ALT SERPL W P-5'-P-CCNC: 21 U/L (ref 10–50)
ANION GAP SERPL CALCULATED.3IONS-SCNC: 11 MMOL/L (ref 7–15)
BUN SERPL-MCNC: 27.4 MG/DL (ref 8–23)
CALCIUM SERPL-MCNC: 10 MG/DL (ref 8.8–10.2)
CHLORIDE SERPL-SCNC: 98 MMOL/L (ref 98–107)
CHOLEST SERPL-MCNC: 127 MG/DL
CREAT SERPL-MCNC: 1.23 MG/DL (ref 0.67–1.17)
DEPRECATED HCO3 PLAS-SCNC: 22 MMOL/L (ref 22–29)
GFR SERPL CREATININE-BSD FRML MDRD: 66 ML/MIN/1.73M2
GLUCOSE SERPL-MCNC: 171 MG/DL (ref 70–99)
HBA1C MFR BLD: 8.1 % (ref 0–5.6)
HDLC SERPL-MCNC: 33 MG/DL
LDLC SERPL CALC-MCNC: 71 MG/DL
NONHDLC SERPL-MCNC: 94 MG/DL
POTASSIUM SERPL-SCNC: 5.1 MMOL/L (ref 3.4–5.3)
SODIUM SERPL-SCNC: 131 MMOL/L (ref 136–145)
T4 FREE SERPL-MCNC: 1.65 NG/DL (ref 0.9–1.7)
TRIGL SERPL-MCNC: 113 MG/DL
TSH SERPL DL<=0.005 MIU/L-ACNC: 0.78 UIU/ML (ref 0.3–4.2)

## 2023-01-27 PROCEDURE — 84439 ASSAY OF FREE THYROXINE: CPT | Performed by: PHYSICIAN ASSISTANT

## 2023-01-27 PROCEDURE — 80061 LIPID PANEL: CPT | Performed by: PHYSICIAN ASSISTANT

## 2023-01-27 PROCEDURE — 84443 ASSAY THYROID STIM HORMONE: CPT | Performed by: PHYSICIAN ASSISTANT

## 2023-01-27 PROCEDURE — 84460 ALANINE AMINO (ALT) (SGPT): CPT | Performed by: PHYSICIAN ASSISTANT

## 2023-01-27 PROCEDURE — 83036 HEMOGLOBIN GLYCOSYLATED A1C: CPT | Performed by: PHYSICIAN ASSISTANT

## 2023-01-27 PROCEDURE — 36415 COLL VENOUS BLD VENIPUNCTURE: CPT | Performed by: PHYSICIAN ASSISTANT

## 2023-01-27 PROCEDURE — 80048 BASIC METABOLIC PNL TOTAL CA: CPT | Performed by: PHYSICIAN ASSISTANT

## 2023-01-27 PROCEDURE — 99214 OFFICE O/P EST MOD 30 MIN: CPT | Performed by: PHYSICIAN ASSISTANT

## 2023-01-27 RX ORDER — GLIPIZIDE 10 MG/1
10 TABLET, FILM COATED, EXTENDED RELEASE ORAL DAILY
Qty: 180 TABLET | Refills: 1 | Status: SHIPPED | OUTPATIENT
Start: 2023-01-27 | End: 2023-09-06

## 2023-01-27 RX ORDER — INSULIN GLARGINE 100 [IU]/ML
INJECTION, SOLUTION SUBCUTANEOUS
COMMUNITY
Start: 2023-01-17 | End: 2023-02-15

## 2023-01-27 ASSESSMENT — ANXIETY QUESTIONNAIRES
1. FEELING NERVOUS, ANXIOUS, OR ON EDGE: NOT AT ALL
3. WORRYING TOO MUCH ABOUT DIFFERENT THINGS: NOT AT ALL
7. FEELING AFRAID AS IF SOMETHING AWFUL MIGHT HAPPEN: NOT AT ALL
7. FEELING AFRAID AS IF SOMETHING AWFUL MIGHT HAPPEN: NOT AT ALL
GAD7 TOTAL SCORE: 2
4. TROUBLE RELAXING: NOT AT ALL
5. BEING SO RESTLESS THAT IT IS HARD TO SIT STILL: NOT AT ALL
6. BECOMING EASILY ANNOYED OR IRRITABLE: SEVERAL DAYS
2. NOT BEING ABLE TO STOP OR CONTROL WORRYING: SEVERAL DAYS
IF YOU CHECKED OFF ANY PROBLEMS ON THIS QUESTIONNAIRE, HOW DIFFICULT HAVE THESE PROBLEMS MADE IT FOR YOU TO DO YOUR WORK, TAKE CARE OF THINGS AT HOME, OR GET ALONG WITH OTHER PEOPLE: NOT DIFFICULT AT ALL
8. IF YOU CHECKED OFF ANY PROBLEMS, HOW DIFFICULT HAVE THESE MADE IT FOR YOU TO DO YOUR WORK, TAKE CARE OF THINGS AT HOME, OR GET ALONG WITH OTHER PEOPLE?: NOT DIFFICULT AT ALL
GAD7 TOTAL SCORE: 2
GAD7 TOTAL SCORE: 2

## 2023-01-27 NOTE — PROGRESS NOTES
"  Assessment & Plan     Type 2 diabetes mellitus with diabetic neuropathy, with long-term current use of insulin (H)  Will cut glipizide due to hypoglycemic episodes  - LANTUS SOLOSTAR 100 UNIT/ML soln  - glipiZIDE (GLUCOTROL XL) 10 MG 24 hr tablet; Take 1 tablet (10 mg) by mouth daily  - Continuous Blood Gluc  (FREESTYLE GIULIANA 2 READER) SIENA; 1 each once for 1 dose Use to read blood sugars per 's instructions.  - Continuous Blood Gluc Sensor (FREESTYLE GIULIANA 2 SENSOR) MISC; 1 each every 14 days Use 1 sensor every 14 days. Use to read blood sugars per 's instructions.  - Basic metabolic panel  (Ca, Cl, CO2, Creat, Gluc, K, Na, BUN); Future  - Albumin Random Urine Quantitative with Creat Ratio; Future  - PRIMARY CARE FOLLOW-UP SCHEDULING; Future  - ALT  - BASIC METABOLIC PANEL  - Albumin Random Urine Quantitative with Creat Ratio  - Hemoglobin A1c    Skin ulcer of middle finger, limited to breakdown of skin (H)  improving    PVD (peripheral vascular disease) (H)  No changes    Morbid obesity (H)  Met with nutritionist. Will work on weight and diet to help with DM2    Hypothyroidism due to acquired atrophy of thyroid  Await labs.   - T4 free  - TSH    Chronic hyponatremia  Referral placed.   Seeing Endocrinology as well.   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    Weight as of this encounter: 107.8 kg (237 lb 9.6 oz).   Weight management plan: Discussed healthy diet and exercise guidelines        No follow-ups on file.   Follow-up Visit   Expected date:  Mar " "27, 2023 (Approximate)      Follow Up Appointment Details:     Follow-up with whom?: Me    Follow-Up for what?: Chronic Disease f/u    Chronic Disease f/u: Diabetes    How?: In Person                    Lisa Pierre PA-C  [facility] [place]    Subjective   Caio is a 62 year old, presenting for the following health issues:  Hospital F/U      Hospitals in [place]       Hospital Follow-up Visit:    Hospital/Nursing Home/IP Rehab Facility: [facility]  Date of Admission: 01/07/23  Date of Discharge: 01/09/23  Reason(s) for Admission:  Cellulitis of finger of right hand, hyponatremia, hypoglycemia due to diabetes mellitus   Was your hospitalization related to COVID-19? No   Problems taking medications regularly:  None  Medication changes since discharge: bupropion 100 mg, Urea 15 g, Celexa 20 mg, oxycodone 5 mg  Problems adhering to non-medication therapy:  None    Summary of hospitalization:  [facility] discharge summary reviewed  Diagnostic Tests/Treatments reviewed.  Follow up needed: specialists  Other Healthcare Providers Involved in Patient s Care:         Homecare and OT  Update since discharge: improved. Contractures of fingers-- daily activities are very challenging for patient due to finger contractures   Plan of care communicated with patient and family   Review of Systems   Constitutional, HEENT, cardiovascular, pulmonary, gi and gu systems are negative, except as otherwise noted.      Objective    /68 (BP Location: Right arm, Patient Position: Sitting, Cuff Size: Adult Regular)   Pulse 93   Ht 1.664 m (5' 5.5\")   Wt 107.8 kg (237 lb 9.6 oz)   SpO2 97%   BMI 38.94 kg/m    Body mass index is 38.94 kg/m .  Physical Exam   GENERAL APPEARANCE: healthy, alert and no distress  RESP: lungs clear to auscultation - no rales, rhonchi or wheezes  CV: regular rates and rhythm, normal S1 S2, no S3 or S4 and no murmur, click or rub  ABDOMEN: soft, nontender, without " hepatosplenomegaly or masses and bowel sounds normal  MS: healing middle finger, multiple contractures of fingers noted.   PSYCH: mentation appears normal and affect flat                    Answers for HPI/ROS submitted by the patient on 1/27/2023  If you checked off any problems, how difficult have these problems made it for you to do your work, take care of things at home, or get along with other people?: Somewhat difficult  PHQ9 TOTAL SCORE: 6  JOSE MANUEL 7 TOTAL SCORE: 2

## 2023-01-27 NOTE — TELEPHONE ENCOUNTER
The Home Care/Assisted Living/Nursing Facility is calling regarding an established patient.  Has the patient seen Home Care in the past or is currently residing in Assisted Living or Nursing Facility? Yes.     LUCHO Castaneda calling from [facility] requesting the following orders that are within the Home Care, Assisted Living or Nursing Home Eval and Treatment standing order and can be signed as standing order signature required by RN.    Preferred Call Back Number: [phone]    Home Care Visits Continuation  Skilled nursing visits once weekly for 6 weeks    Any additional Orders:  Are there any orders requested, not stated above, that are outside of the standing order and must be routed to a licensed practitioner for approval?    No    Writer has verified Requestor will send fax to have orders signed.      Verbal orders given    Routing to PCP as an FYI    Amee Alexandre RN

## 2023-01-31 ENCOUNTER — PATIENT OUTREACH (OUTPATIENT)
Dept: FAMILY MEDICINE | Facility: CLINIC | Age: 63
End: 2023-01-31

## 2023-01-31 ENCOUNTER — TELEPHONE (OUTPATIENT)
Dept: FAMILY MEDICINE | Facility: CLINIC | Age: 63
End: 2023-01-31

## 2023-01-31 DIAGNOSIS — E87.0 HYPERNATREMIA: Primary | ICD-10-CM

## 2023-01-31 DIAGNOSIS — Z53.9 DIAGNOSIS NOT YET DEFINED: Primary | ICD-10-CM

## 2023-01-31 PROCEDURE — G0180 MD CERTIFICATION HHA PATIENT: HCPCS | Performed by: PHYSICIAN ASSISTANT

## 2023-01-31 NOTE — TELEPHONE ENCOUNTER
Spoke to Caio who informs he saw Dr. Escalona, Nephrologist from [facility]. Patient was unable to leave urine sample at time of visit so orders will be faxed to this location. Patient will need to schedule lab visit to complete. Patient was informed this may take a few days to send orders. Advised patient this RN would monitor for orders and inform patient when orders arrive. RN will contact patient by Friday if order is not received.     Patient informs paperwork for sensors and reader was processed and patient was able to  from pharmacy. Patient was informed that current [facility] [place] is closing and will need to transfer to [facility] on [address]. Preferred pharmacy updated in patient chart.     Yudith GARCIA RN, BSN, PHN - PAL (patient advocate liaison)  [facility]  [phone]

## 2023-01-31 NOTE — LETTER
Steve Morgan  [id number]  [place] [address]    Thank you for choosing [facility] today for your health care needs.     [facility] is transforming primary care  At [facility], we re dedicated to constantly improve how we serve the health care needs of our patients and communities. We re currently making changes to the way we deliver care.     Changes you ll notice include:    An emphasis on building a relationship with a primary care provider    Access to a PAL (patient advocate and liaison) to help guide you with your care needs    Appointment lengths tailored to your specific needs and greater access to a care team to help you and your provider improve and maintain your health and well-being    Improved online access to your care team    Benefits of a primary care provider  If you don t have a designated primary care provider, we encourage you to get to know our care team online and find a provider you d like to see. Most of our providers have a short video on their online provider page. Visit [email or web address] to explore our providers and locations.    Benefits of having a primary care provider include:      They get to know you - your health history, family history and goals, making it easier to make a health plan together.     You get to know them - making health-related conversations and decisions easier      Primary care doctors help you when you re sick or hurt - but also focus on keeping you healthy with preventive care and screenings.      A doctor who sees you regularly is more likely to notice changes in your health.     You ll be connected to a broad care team who partners with your provider to support you.    Patient Advocate Liaison (PAL)   To help make sure you get the right care, at the right time, we include PALs, or Patient Advocate Liaisons, as part of your care team. Your PAL will be your first line of contact. They ll advocate for your needs and help you  navigate our services, connecting you with care team members and community resources to ensure your care is well coordinated. You ll be introduced to a PAL in an upcoming visit.     Expanded care team access with tailored appointment lengths  Depending on your health care needs, you may have longer or shorter appointments and see additional care team providers - including Medication Therapy Management (MTM) pharmacists, diabetes educators, behavioral health clinicians, or social workers. At times, they may be included in your visit with your provider, or you may see them individually.     Online access to your health care records and care team  [employer] is our online tool that makes it easy to see your health care information and communicate with your care team.     Assembly allows you to:     View your health maintenance plan so you know when you re due for a preventive screening    Send secure messages to your care team    View your health history and visit summaries     Schedule appointments     Complete questionnaires and eCheck-in before appointments      Get care from your provider with an e-visit      View and pay your bill     Sign up at [employer]. Once you have an account, you also can download the mobile suleman.     Connecting to fast and convenient care  When you need fast, convenient care - consider one of the following options:       Video Visit: A convenient care option for visiting with your provider out of the comfort of your own home. Most of the things you come to the clinic to address with your provider can now be done virtually through a video. This includes your chronic medication follow up, questions or concerns you may have, and even your annual Medicare Wellness Visit.       Phone Visit: Another convenient option for follow up of common problems that may require a more in-depth discussion with your provider.       E-visit: When you need acute care quickly, or have a quick question about  your medication, an E-visit is completed through [employer] and your provider will respond within one business day.      Yudith Vigil Registered Nurse, PAL (Patient Advocate Liaison)   [facility]   [phone]

## 2023-01-31 NOTE — TELEPHONE ENCOUNTER
Fax received from [facility] for Home Health Certification and Plan of Care , this will be placed in CJ in box for completion .    Patt Rodríguez

## 2023-02-02 ENCOUNTER — MEDICAL CORRESPONDENCE (OUTPATIENT)
Dept: HEALTH INFORMATION MANAGEMENT | Facility: CLINIC | Age: 63
End: 2023-02-02

## 2023-02-02 ENCOUNTER — TELEPHONE (OUTPATIENT)
Dept: FAMILY MEDICINE | Facility: CLINIC | Age: 63
End: 2023-02-02
Payer: MEDICARE

## 2023-02-02 NOTE — TELEPHONE ENCOUNTER
Received 2 page fax for Standard Written Order for Lisa Pierre to complete. Form in the in-basket at 's desk.

## 2023-02-03 DIAGNOSIS — E87.0 HYPERNATREMIA: Primary | ICD-10-CM

## 2023-02-03 NOTE — TELEPHONE ENCOUNTER
Reviewed by covering RN PAL     Lab orders are in epic     Called patient and scheduled lab only appt 2/13/2023 @ 1:00    Marcela Blackburn Registered Nurse, PAL (Patient Advocate Liaison)   [facility]   [phone]

## 2023-02-06 ENCOUNTER — LAB (OUTPATIENT)
Dept: LAB | Facility: CLINIC | Age: 63
End: 2023-02-06
Payer: MEDICARE

## 2023-02-06 DIAGNOSIS — L98.491: ICD-10-CM

## 2023-02-06 DIAGNOSIS — M24.542 CONTRACTURE OF JOINT OF FINGER OF LEFT HAND: ICD-10-CM

## 2023-02-06 DIAGNOSIS — E03.4 HYPOTHYROIDISM DUE TO ACQUIRED ATROPHY OF THYROID: ICD-10-CM

## 2023-02-06 DIAGNOSIS — Z13.220 SCREENING FOR HYPERLIPIDEMIA: ICD-10-CM

## 2023-02-06 DIAGNOSIS — M24.541 CONTRACTURE OF JOINT OF FINGER, RIGHT: ICD-10-CM

## 2023-02-06 DIAGNOSIS — E87.1 CHRONIC HYPONATREMIA: ICD-10-CM

## 2023-02-06 DIAGNOSIS — E11.40 TYPE 2 DIABETES MELLITUS WITH DIABETIC NEUROPATHY, WITH LONG-TERM CURRENT USE OF INSULIN (H): ICD-10-CM

## 2023-02-06 DIAGNOSIS — E87.0 HYPERNATREMIA: ICD-10-CM

## 2023-02-06 DIAGNOSIS — Z79.4 TYPE 2 DIABETES MELLITUS WITH DIABETIC NEUROPATHY, WITH LONG-TERM CURRENT USE OF INSULIN (H): ICD-10-CM

## 2023-02-06 DIAGNOSIS — I73.9 PVD (PERIPHERAL VASCULAR DISEASE) (H): ICD-10-CM

## 2023-02-06 DIAGNOSIS — E66.01 MORBID OBESITY (H): ICD-10-CM

## 2023-02-06 LAB
ANION GAP SERPL CALCULATED.3IONS-SCNC: 11 MMOL/L (ref 7–15)
BUN SERPL-MCNC: 34.8 MG/DL (ref 8–23)
CALCIUM SERPL-MCNC: 9.2 MG/DL (ref 8.8–10.2)
CHLORIDE SERPL-SCNC: 101 MMOL/L (ref 98–107)
CREAT SERPL-MCNC: 1.19 MG/DL (ref 0.67–1.17)
CREAT UR-MCNC: 67.4 MG/DL
DEPRECATED HCO3 PLAS-SCNC: 22 MMOL/L (ref 22–29)
GFR SERPL CREATININE-BSD FRML MDRD: 68 ML/MIN/1.73M2
GLUCOSE SERPL-MCNC: 205 MG/DL (ref 70–99)
MICROALBUMIN UR-MCNC: 78.3 MG/L
MICROALBUMIN/CREAT UR: 116.17 MG/G CR (ref 0–17)
OSMOLALITY UR: 501 MMOL/KG (ref 100–1200)
POTASSIUM SERPL-SCNC: 4.9 MMOL/L (ref 3.4–5.3)
SODIUM SERPL-SCNC: 134 MMOL/L (ref 136–145)
SODIUM UR-SCNC: 56 MMOL/L

## 2023-02-06 PROCEDURE — 82570 ASSAY OF URINE CREATININE: CPT

## 2023-02-06 PROCEDURE — 82043 UR ALBUMIN QUANTITATIVE: CPT

## 2023-02-06 PROCEDURE — 84300 ASSAY OF URINE SODIUM: CPT

## 2023-02-06 PROCEDURE — 83935 ASSAY OF URINE OSMOLALITY: CPT

## 2023-02-06 PROCEDURE — 80048 BASIC METABOLIC PNL TOTAL CA: CPT

## 2023-02-06 PROCEDURE — 36415 COLL VENOUS BLD VENIPUNCTURE: CPT

## 2023-02-08 ENCOUNTER — PATIENT OUTREACH (OUTPATIENT)
Dept: FAMILY MEDICINE | Facility: CLINIC | Age: 63
End: 2023-02-08
Payer: MEDICARE

## 2023-02-08 NOTE — TELEPHONE ENCOUNTER
I would recommend continue with current plan of follow up with CDE in 2 days and follow up with endocrine next week.

## 2023-02-08 NOTE — TELEPHONE ENCOUNTER
Patient reports BG in 200-300. Denies current symptoms. Advised of recommendation from Stefany Chan PA-C and to report any new or worsening symptoms. Patient was given an opportunity to ask questions, verbalized understanding of plan, and is agreeable.    Patient reports he may need a brace for fingers and ask if Lisa made note of this during visit on 1/27/23. 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 Contractures of fingers-- daily activities are very challenging for patient due to finger contractures   MS: healing middle finger, multiple contractures of fingers noted    Asked if patient needed order for a brace or what additional information was needed. Patient reports he has visit with OT tomorrow and will discuss at that time and return call to RN.     Patient reports he is leaving for vacation from 2/18/23-3/4/23 and will be discharged from home care prior to vacation. Patient has follow-up visit with Lisa on 3/10/23. Advised patient can discuss at time of visit.     Yudith GARCIA RN, BSN, PHN - PAL (patient advocate liaison)  [facility]  [phone]

## 2023-02-08 NOTE — TELEPHONE ENCOUNTER
Stefany Chan PA-C-    Homecare RN notes upward trend in patients blood glucose and would like PCP to be aware. Please review and provide any recommendation as Lisa is out. RN attempted to call patient for additional information.     LUCHO Gilliam from  [facility] called to report patients blood glucose has been trending upwards. Wanted PCP to be aware prior to visit on 3/10/23.  - Tawana reports patient had fasting  yesterday, informs patient denies any drastic high/low blood sugars, denies signs or symptoms at time of visit.   -LUCHO Gilliam reports patient feels upward trend is due to diet and provided patient education around nutrition.     Patient is scheduled with CDE on 2/10/23, has return visit with Dr. Kendall on 2/13/23.    Message left to patient to return call to this RN PAL. Please transfer if available.    Direct number left for this RN PAL on message for return call.     Yudith GARCIA RN, BSN, PHN, PAL (patient advocate liaison)   [facility]  [phone]

## 2023-02-09 ENCOUNTER — PATIENT OUTREACH (OUTPATIENT)
Dept: CARE COORDINATION | Facility: CLINIC | Age: 63
End: 2023-02-09
Payer: MEDICARE

## 2023-02-09 ENCOUNTER — TELEPHONE (OUTPATIENT)
Dept: FAMILY MEDICINE | Facility: CLINIC | Age: 63
End: 2023-02-09
Payer: MEDICARE

## 2023-02-09 ASSESSMENT — ACTIVITIES OF DAILY LIVING (ADL): DEPENDENT_IADLS:: TRANSPORTATION

## 2023-02-09 NOTE — TELEPHONE ENCOUNTER
Magalys from [facility] OT called regarding high blood pressure readings for Steve-please call her at [phone].
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
## 2023-02-09 NOTE — TELEPHONE ENCOUNTER
Patient Quality Outreach Summary      Summary:    Patient is due/failing the following:   BP Check and LDL (Fasting)   Patient needing to schedule nurse only and fasting lab only apt.     Type of outreach:    Phone, spoke to patient/parent. pts wife informed (CTC on file) will inform patient to call to schedule.     Questions for provider review:    None                                                                                                                    ANNELIESE Maki       Chart routed to Care Team.     TORREY

## 2023-02-10 DIAGNOSIS — H69.81 OTHER SPECIFIED DISORDERS OF EUSTACHIAN TUBE, RIGHT EAR: ICD-10-CM

## 2023-02-10 RX ORDER — FLUTICASONE PROPIONATE 50 MCG
SPRAY, SUSPENSION (ML) NASAL
Qty: 16 ML | Refills: 1 | Status: SHIPPED | OUTPATIENT
Start: 2023-02-10 | End: 2023-06-01

## 2023-02-10 NOTE — TELEPHONE ENCOUNTER
Spoke to patient, is agreeable. Scheduled at same time of lab only on 2/14/23.    Yudith GARCIA RN, BSN, PHN - PAL (patient advocate liaison)  [facility]  [phone]

## 2023-02-10 NOTE — TELEPHONE ENCOUNTER
Message left to return call to this RN PAL. Please transfer if available.    Direct number left for this RN PAL on message for return call.     Yudith GARCIA RN, BSN, PHN, PAL (patient advocate liaison)   [facility]  [phone]

## 2023-02-10 NOTE — TELEPHONE ENCOUNTER
Stefany Chan PA-C-    Please advise of any recommendations regarding patients elevated BP. Patient does not monitor his BP at home. BP check in clinic next week?     Spoke to Magalys Ann [facility] OT. Saw patient for OT Wednesday and Thursday. Patient had high blood pressure readings on both days. Blood pressure readings were done with a wrist cuff.    Magalys reports she saw patient at 1130am Wednesday, patient had not taken BP meds at time of visit as he woke up late and wanted to wait to space medication out from thyroid meds.   BP- 161/97  Thursday saw patient at 2:00pm  BP: 167/94  System is set up to alert any diastolic over 90.    Magalys reports patient was tired but not exhibiting any other symptoms, all other vitals WNL.    Patient reports he has not been taking blood pressure medications at the same time every day. Patient reports he does not have breakfast at the same time and has been forgetting to take medications. Patient takes thyroid medication first, eats breakfast and then takes other medications.  -Education provided on importance of taking medications as prescribed. Patient verbalized understanding.   -RN discussed setting reminder or some way to help patient remember. Patient reports he will set a reminder on his Brianne.     Home care will be discharging patient 2/17/23 as patient is going to [place] on Friday.     Patient requested lab results from 2/6/23. Labs ordered by Dr. Casey  Advised patient to follow up with ordering provider.     Patient denies symptoms- headache, swelling in extremities, numbness, tingling, chest pain.   Patient does not monitor blood pressure at home.     Yudith GARCIA RN, BSN, PHN, PAL (patient advocate liaison)   [facility]  [phone]

## 2023-02-10 NOTE — TELEPHONE ENCOUNTER
Yes in clinic BP check would be preferred especially since the wrist cuffs are typically not very accurate.

## 2023-02-10 NOTE — TELEPHONE ENCOUNTER
Routing refill request to provider for review/approval because:  Medication is reported/historical    Cara Marrero RN   PAL (Patient Advocate Liason)  [facility]

## 2023-02-13 ASSESSMENT — ENCOUNTER SYMPTOMS
VOMITING: 1
DYSPNEA ON EXERTION: 0
JAUNDICE: 0
EYE IRRITATION: 0
ABDOMINAL PAIN: 0
MEMORY LOSS: 1
SHORTNESS OF BREATH: 0
NAIL CHANGES: 0
NAUSEA: 0
EYE WATERING: 0
HEADACHES: 1
SPUTUM PRODUCTION: 0
WHEEZING: 0
CONSTIPATION: 0
NUMBNESS: 1
EYE PAIN: 0
RECTAL PAIN: 0
DISTURBANCES IN COORDINATION: 0
BOWEL INCONTINENCE: 0
COUGH: 1
LOSS OF CONSCIOUSNESS: 0
DIZZINESS: 1
WEAKNESS: 0
SNORES LOUDLY: 0
POSTURAL DYSPNEA: 0
HEMOPTYSIS: 0
TINGLING: 0
BLOOD IN STOOL: 0
HEARTBURN: 0
PARALYSIS: 0
POOR WOUND HEALING: 0
SKIN CHANGES: 0
TREMORS: 1
BLOATING: 0
SEIZURES: 0
DIARRHEA: 0
DOUBLE VISION: 0
COUGH DISTURBING SLEEP: 1
SPEECH CHANGE: 0
EYE REDNESS: 0

## 2023-02-14 ENCOUNTER — ALLIED HEALTH/NURSE VISIT (OUTPATIENT)
Dept: FAMILY MEDICINE | Facility: CLINIC | Age: 63
End: 2023-02-14
Payer: MEDICARE

## 2023-02-14 ENCOUNTER — DOCUMENTATION ONLY (OUTPATIENT)
Dept: LAB | Facility: CLINIC | Age: 63
End: 2023-02-14

## 2023-02-14 ENCOUNTER — LAB (OUTPATIENT)
Dept: LAB | Facility: CLINIC | Age: 63
End: 2023-02-14
Payer: MEDICARE

## 2023-02-14 VITALS — SYSTOLIC BLOOD PRESSURE: 110 MMHG | DIASTOLIC BLOOD PRESSURE: 64 MMHG

## 2023-02-14 DIAGNOSIS — E87.0 HYPERNATREMIA: ICD-10-CM

## 2023-02-14 DIAGNOSIS — Z01.30 BP CHECK: Primary | ICD-10-CM

## 2023-02-14 LAB
ANION GAP SERPL CALCULATED.3IONS-SCNC: 12 MMOL/L (ref 7–15)
BUN SERPL-MCNC: 31 MG/DL (ref 8–23)
CALCIUM SERPL-MCNC: 9.9 MG/DL (ref 8.8–10.2)
CHLORIDE SERPL-SCNC: 101 MMOL/L (ref 98–107)
CREAT SERPL-MCNC: 1.31 MG/DL (ref 0.67–1.17)
DEPRECATED HCO3 PLAS-SCNC: 20 MMOL/L (ref 22–29)
GFR SERPL CREATININE-BSD FRML MDRD: 61 ML/MIN/1.73M2
GLUCOSE SERPL-MCNC: 143 MG/DL (ref 70–99)
POTASSIUM SERPL-SCNC: 5 MMOL/L (ref 3.4–5.3)
SODIUM SERPL-SCNC: 133 MMOL/L (ref 136–145)

## 2023-02-14 PROCEDURE — 99207 PR NO CHARGE NURSE ONLY: CPT

## 2023-02-14 PROCEDURE — 36415 COLL VENOUS BLD VENIPUNCTURE: CPT

## 2023-02-14 PROCEDURE — 80048 BASIC METABOLIC PNL TOTAL CA: CPT

## 2023-02-14 NOTE — PROGRESS NOTES
Blood pressure recheck by this RN after 5 minutes of rest:  110/64.    Patient has not yet taken blood pressure medication. Will take medication when he returns home.    Ruthie Pedraza RN

## 2023-02-15 ENCOUNTER — TELEPHONE (OUTPATIENT)
Dept: ENDOCRINOLOGY | Facility: CLINIC | Age: 63
End: 2023-02-15

## 2023-02-15 ENCOUNTER — OFFICE VISIT (OUTPATIENT)
Dept: ENDOCRINOLOGY | Facility: CLINIC | Age: 63
End: 2023-02-15
Payer: MEDICARE

## 2023-02-15 VITALS
SYSTOLIC BLOOD PRESSURE: 121 MMHG | BODY MASS INDEX: 37.33 KG/M2 | RESPIRATION RATE: 16 BRPM | TEMPERATURE: 97.4 F | DIASTOLIC BLOOD PRESSURE: 67 MMHG | HEART RATE: 67 BPM | HEIGHT: 66 IN | OXYGEN SATURATION: 97 % | WEIGHT: 232.3 LBS

## 2023-02-15 DIAGNOSIS — Z79.4 TYPE 2 DIABETES MELLITUS WITH DIABETIC NEUROPATHY, WITH LONG-TERM CURRENT USE OF INSULIN (H): Primary | ICD-10-CM

## 2023-02-15 DIAGNOSIS — E11.40 TYPE 2 DIABETES MELLITUS WITH DIABETIC NEUROPATHY, WITH LONG-TERM CURRENT USE OF INSULIN (H): ICD-10-CM

## 2023-02-15 DIAGNOSIS — E11.40 TYPE 2 DIABETES MELLITUS WITH DIABETIC NEUROPATHY, WITH LONG-TERM CURRENT USE OF INSULIN (H): Primary | ICD-10-CM

## 2023-02-15 DIAGNOSIS — Z79.4 TYPE 2 DIABETES MELLITUS WITH DIABETIC NEUROPATHY, WITH LONG-TERM CURRENT USE OF INSULIN (H): ICD-10-CM

## 2023-02-15 DIAGNOSIS — E11.42 DIABETIC POLYNEUROPATHY ASSOCIATED WITH TYPE 2 DIABETES MELLITUS (H): ICD-10-CM

## 2023-02-15 DIAGNOSIS — E03.4 HYPOTHYROIDISM DUE TO ACQUIRED ATROPHY OF THYROID: Primary | ICD-10-CM

## 2023-02-15 PROCEDURE — 99207 PR FOOT EXAM NO CHARGE: CPT | Performed by: INTERNAL MEDICINE

## 2023-02-15 PROCEDURE — 95251 CONT GLUC MNTR ANALYSIS I&R: CPT | Performed by: INTERNAL MEDICINE

## 2023-02-15 PROCEDURE — 99214 OFFICE O/P EST MOD 30 MIN: CPT | Performed by: INTERNAL MEDICINE

## 2023-02-15 RX ORDER — LEVOTHYROXINE SODIUM 137 UG/1
137 TABLET ORAL DAILY
Qty: 90 TABLET | Refills: 3 | Status: SHIPPED | OUTPATIENT
Start: 2023-02-15 | End: 2024-02-14

## 2023-02-15 RX ORDER — INSULIN GLARGINE 100 [IU]/ML
30 INJECTION, SOLUTION SUBCUTANEOUS DAILY
Qty: 30 ML | Refills: 0 | Status: SHIPPED | OUTPATIENT
Start: 2023-02-15 | End: 2023-05-04

## 2023-02-15 RX ORDER — INSULIN GLARGINE 100 [IU]/ML
25 INJECTION, SOLUTION SUBCUTANEOUS AT BEDTIME
Qty: 15 ML | Refills: 1 | Status: SHIPPED | OUTPATIENT
Start: 2023-02-15 | End: 2023-06-01

## 2023-02-15 NOTE — PATIENT INSTRUCTIONS
[facility]  Dr Meraz, Endocrinology Department    [facility]   [address]  [place] [address]  Appointment [phone]  Fax: [phone]  [place]: Monday - Thursday      Please check the cost coverage and copay with insurance before recommended tests, services and medications (especially if new medications are prescribed).     If ordered, please get blood work done 1 week prior to your next appointment so they will be available to Dr. Meraz at your visit.    To provide the best diabetic care, please bring your blood glucose meter to each and every visit with your  Endocrinologist. Your blood glucose CGM/meter/insulin pump will be downloaded at every appointment.  Please arrive 15 minutes before your scheduled appointment. This will allow for your blood glucose CGM/meter/insulin pump  to be downloaded.  If you are wearing DEXCOM please bring  or sharing code from the Dexcom Clarity [place] so that it can be downloaded.  If you are using FREESTYLE JUAN personal sensors please bring the reader.    Thyroid:  Thyroid labs are in acceptable range.  Continue current dose of thyroid hormone replacement.  Repeat labs in 1 year or sooner if concerns or major weight changes.    Diabetes:  Continue Metformin 1000 mg twice a day and Glipizide XL 10 mg/day.  Lantus -- increase to 25 units at night.  Start Jardiance 10 mg/day.  Continue to use juan.  Labs in 3-6 months.  Please make a lab appointment for blood work and follow up clinic appointment in 1 week after that to discuss results.    Canagliflozin (Invokana), Dapagliflozin (Farxiga), empagliflozin (Jardiance),ertugliflozin (Steglatro) - these are other alternatives.  Please check cost with insurance.    Possible side effect of this class of medications include: urinary tract infections, low blood glucose, hypersensitivity reactions.  Rare side effects include risk for amputations, kidney  infections and ketoacidosis.      Recommend checking blood sugars before meals and at bedtime.    If Blood glucose are low more often-> 2-3 times/week- give us a call.  Make better food choices: reduce carbs, Reduce portion size, weight loss and exercise 3-4 times a week.    What is hypoglycemia:  Hypoglycemia is when blood sugar levels become too low - below 70 m/dl.      What causes hypoglycemia?  - using too much insulin  -taking too many diabetes pills  -not eating enough, or skipping meals or snacks  -not eating enough carbohydrate with meals  -changing your exercise routine  -drinking alcohol in excess    It is also possible to have hypoglycemia even when you are carefully managing your blood sugar levels.    What does it feel like when blood sugars get too low?  You may feel:  - anxious  -confused  -dizzy  -hungry  -light-headed  -nervous  -shaky  -sleepy  -sweaty    You may have  -blurred or cloudy vision  -heart palpitations (heart skips a beat or races)  -tingling or numbness around the mouth and tongue  -tremors    What to do if you have symptoms of hypoglycmemia:  If you think your blood sugar is too low, check it with a glucose meter.  If its below 70 mg/dl, consume one of the following:  Fruit juice (1/2 cup)  Glucose tablets (15 grams)  Hard candy (5 to 7 pieces)  Honey or sugar (2 teaspoons)  Milk (1/2 cup)  Soft drink (non-diet, 1/2 cup)    Wait 15 minutes and check your blood glucose again.  IF it is still below 70 mg/dl, have another food item listed above. Wait another 15 minutes and repeat the blood glucose test.  Have a small meal or snack that contains some carbohydrate after your blood glucose rises above 70 mg/dl.    If you are at risk of hypoglycemia, always carry with you glucose tablets or one of the foods listed above.      To prevent Hypoglycemia:  Avoid situations that may cause hypoglycemia  Before making any change to your diet or exercise routine, discuss them with your healthcare  provider  Keep a record of your blood glucose levels.  Include the time of day, diabetes medications, when you had your last meal or snack, and what you were doing at the time (e.g. Watching TV, gardening, jogging, etc).    Talk to your healthcare provider if your blood glucose levels are often low        Patient guide on hypoglycemia    http://www.hormone.org/Resources/upload/patient-guide-diagnosis-and-management-hypoglycemia-[email or web address].pdf

## 2023-02-15 NOTE — TELEPHONE ENCOUNTER
Continue current regimen- Metformin 1000 mg BID, Glipizide XL 20 mg/day and Basaglar 30 units at night.  Continue to hold off Novolog (short acting insulin)  Be consistent with medications. Set up reminders on phone or use visual reminders in kitchen.  Diabetic Educator follow up in 6 weeks for Blood glucose review and titration.  Start using juan as soon as possible- scan often.  Labs in 3-4 months.    Rx sent

## 2023-02-15 NOTE — TELEPHONE ENCOUNTER
[facility] Call Center    Phone Message    May a detailed message be left on voicemail: yes     Reason for Call: Medication Refill Request    Has the patient contacted the pharmacy for the refill? Yes   Name of medication being requested: Basaglar, Quick Pens for Insulin.   Provider who prescribed the medication: Cindi Meraz MD  Pharmacy: [facility]/PHARMACY #[id number] [address] [facility]  Date medication is needed: As soon as possible. Patient going out of town.           Action Taken: Other: Endo    Travel Screening: Not Applicable

## 2023-02-15 NOTE — TELEPHONE ENCOUNTER
Patient was seen in clinic today.  He was started on Jardiance 10 mg/day.  History of CKD and microalbuminuria.  Noted to have hyperkalemia during previous hospitalization  Most recent potassium levels are in normal range.    Recommend to check potassium levels in 1-2 weeks after starting Jardiance.  Please make lab only appointment.  Please CALL and inform patient and help schedule lab appointment if needed.

## 2023-02-15 NOTE — TELEPHONE ENCOUNTER
Patient won't be back until 03/04, I scheduled lab for 03/06.    Anisha Diop CMA  [facility] Endocrinology [place]  [phone]

## 2023-02-15 NOTE — NURSING NOTE
ENDOCRINOLOGY INTAKE FORM    Patient Name:  Steve Morgan  :  1960    Is patient Diabetic?   Yes: type 2  Does patient have non-diabetic or other endocrine issues?  Yes: Chronic hyponatremia  Dyslipidemia  HYPERLIPIDEMIA LDL GOAL <100  Hyponatremia  Hypothyroidism  Morbid obesity due to excess calories (H)    Vitals: There were no vitals taken for this visit.  BMI= There is no height or weight on file to calculate BMI.    Flu vaccine:  Yes:   Pneumonia vaccine:  Yes: 13 x 1, 23 x 1    Smoking and Alcohol use:  Social History     Tobacco Use     Smoking status: Never     Smokeless tobacco: Never   Vaping Use     Vaping Use: Never used   Substance Use Topics     Alcohol use: Yes     Comment: Occassionally     Drug use: No       Foot Exam: No  Eye Exam:  Yes: 22  Dental Exam:    Aspirin Use:  Yes: 81 mg    Lab Results   Component Value Date    A1C 8.1 2023    A1C 9.3 2023    A1C 9.2 2022    A1C 8.6 2022    A1C 9.7 2021    A1C 7.4 2021    A1C 9.9 2020    A1C 11.7 2019    A1C 11.6 2019    A1C 11.8 2018       Lab Results   Component Value Date    MICROL 78.3 2023    MICROL 84 2021    MICROL 14 2020     No results found for: MICROALBUMIN    Anisha Diop CMA  [facility] Endocrinology [place]  [phone]

## 2023-02-15 NOTE — PROGRESS NOTES
"Name: Steve \"Caio\" Cathy  F/u for Diabetes and hypothyroidism.  HPI:  For f/u of DM.    has a past medical history of Cellulitis and abscess of trunk (6/27/2017), Depression, Hyperlipidemia LDL goal <100 (10/31/2010), Hypertension goal BP (blood pressure) < 140/90 (3/17/2011), Hypothyroidism (1/12/2010), Morbid obesity due to excess calories (H) (1/12/2010), Neuropathy in diabetes (H) (1/12/2010), Obesity (1/12/2010), Sleep apnea (1/12/2010), and Type 2 diabetes, HbA1C goal < 8% (H) (3/8/2011).    He has challenges with blood sugar testing due to limited use of his hands (neuropathy + tremor - both significant) and increasing difficulty with ADL's.  Can't test his blood sugars using a meter due to the above.   He also has an extremely hard time inserting the Nina sensors due to neuropathy and tremor.  History of poor compliance in past.  Was started on Novolog in past but not currently taking it.  He has stopping drinking pop since Sep 2021.  Exercise is limited.  CKD stage 2- followed by nephrology.  Most recent creatinine 1.31.  Noted recent hospitalization in December 2022 for nonhealing chronic wound to right third finger and underlying osteomyelitis.  He underwent I&D and partial amputation and required IV antibiotics.  Following that he was discharged to nursing home and he was there from December 29-January 18.  He was having few episodes of hypoglycemia during that time.  Since he is back home blood sugars are on higher side.  Earlier noted mild hyperkalemia which since has resolved.  Most recent potassium levels are in normal range.  Diet- trying to eat healthy.  Lost some weight.  Wt Readings from Last 2 Encounters:   02/15/23 105.4 kg (232 lb 4.8 oz)   01/27/23 107.8 kg (237 lb 9.6 oz)     1. Type 2 DM:  Orginally diagnosed at the age of 33 or 38.  Was prediabetic prior, was not particularly symptomatic at the time, was noted on routine lab work    Current Regimen:   Metformin 1000 mg BID  Glipizide " XL 10 mg/day  Lantus 22 units at night    The plan was to start Trulicity but the copay was not affordable ($100 per refill).    yes:     Diabetes Medication(s)     Biguanides       metFORMIN (GLUCOPHAGE) 1000 MG tablet    Take 1 tablet (1,000 mg) by mouth 2 times daily (with meals)    Insulin       insulin glargine (LANTUS VIAL) 100 UNIT/ML vial    Inject 22 Units Subcutaneous At Bedtime     LANTUS SOLOSTAR 100 UNIT/ML soln        Sulfonylureas       glipiZIDE (GLUCOTROL XL) 10 MG 24 hr tablet    Take 1 tablet (10 mg) by mouth daily        BS checks: Nina  Nina Download: Blood glucose data reviewed personally. See nursing note from this encounter for details.  Unable to use glucose meter - he has a hard time doing fingerstick testing due to his tremor.    Complications:   Diabetes Complications  Description / Detail    Diabetic Retinopathy  No retinopathy,last exam 2/2020, [facility]   CAD / PAD  No   Neuropathy  Yes + diabetic neuropathy: numbness hands and feet.  Saw podiatry, Dr. Mathis, 1/27/2018- using diabetic shoes   Nephropathy / Microalbuminuria  Yes, elevated urine microalbumin   Gastroparesis  No   Hypoglycemia [place]  Not sure, gets 'kind of dizzy' when blood sugar is low but reports history of low blood sugars without symptoms     2. Hypertension: Blood Pressure:   BP Readings from Last 3 Encounters:   02/15/23 121/67   02/14/23 110/64   01/27/23 108/68   Blood pressure medications include atenolol 25 mg qd and losartan 50 mg every day.   3. Hyperlipidemia: Takes simvastatin 20 mg for lipid control.       4.  Hypothyroidism. Currently treated with levoxyl (KAMERON) 137 mcg daily. Takes the levoxyl first thing in the morning and waits 30+ minutes before eating breakfast.   1/2023 labs in range.  PMH/PSH:  Past Medical History:   Diagnosis Date     Cellulitis and abscess of trunk 6/27/2017     Depression      Hyperlipidemia LDL goal <100 10/31/2010     Hypertension goal BP (blood pressure) < 140/90  3/17/2011     Hypothyroidism 1/12/2010     Morbid obesity due to excess calories (H) 1/12/2010     Neuropathy in diabetes (H) 1/12/2010     Obesity 1/12/2010     Sleep apnea 1/12/2010    CPAP     Type 2 diabetes, HbA1C goal < 8% (H) 3/8/2011     Past Surgical History:   Procedure Laterality Date     BIOPSY       COLONOSCOPY       EYE SURGERY       GENITOURINARY SURGERY      surg for undescended testicle     IRRIGATION AND DEBRIDEMENT HAND, COMBINED Right 12/23/2022    Procedure: Right long finger irrigation and debridement with partial amputation of the right long finger. ;  Surgeon: Colby English MD;  Location: [facility] OR     REPAIR HAMMER TOE BILATERAL  5/16/2013    Procedure: REPAIR HAMMER TOE BILATERAL;  Flexor Tenotomy Toes 2,3,4,5 Bilateral Feet;  Surgeon: Saad Bangura DPM;  Location: [facility] OR     Family Hx:  Family History   Problem Relation Age of Onset     Breast Cancer Mother      Hypertension Mother      Thyroid Disease Mother      Depression Mother      Alzheimer Disease Mother 82     Cancer - colorectal Father      Thyroid Disease Father      Depression Father      Other - See Comments Father         bladder polyps     Heart Disease Maternal Grandmother         CHF     Circulatory Paternal Grandmother      Cancer Paternal Grandfather      Diabetes Brother      Diabetes Brother      Asthma Brother      Thyroid disease: Yes: mother         DM2: Yes: one brother with type 1 diabetes and one brother with type 2 diabetes, paternal aunt with DM2         Autoimmune: DM1, SLE, RA, Vitiligo Yes: brother with DM1    Social Hx:  Social History     Socioeconomic History     Marital status:      Spouse name: Sri     Number of children: 2     Years of education: Not on file     Highest education level: Associate degree: occupational, technical, or vocational program   Occupational History     Occupation:      Employer: NONE      Comment: [employer]   Tobacco Use     Smoking status: Never      Smokeless tobacco: Never   Vaping Use     Vaping Use: Never used   Substance and Sexual Activity     Alcohol use: Yes     Comment: Occassionally     Drug use: No     Sexual activity: Not Currently     Partners: Female     Birth control/protection: Abstinence   Other Topics Concern     Parent/sibling w/ CABG, MI or angioplasty before 65F 55M? No   Social History Narrative     Not on file     Social Determinants of Health     Financial Resource Strain: Low Risk      Difficulty of Paying Living Expenses: Not hard at all   Food Insecurity: No Food Insecurity     Worried About Running Out of Food in the Last Year: Never true     Ran Out of Food in the Last Year: Never true   Transportation Needs: No Transportation Needs     Lack of Transportation (Medical): No     Lack of Transportation (Non-Medical): No   Physical Activity: Inactive     Days of Exercise per Week: 0 days     Minutes of Exercise per Session: 0 min   Stress: No Stress Concern Present     Feeling of Stress : Not at all   Social Connections: Moderately Integrated     Frequency of Communication with Friends and Family: Once a week     Frequency of Social Gatherings with Friends and Family: Once a week     Attends Rastafarian Services: 1 to 4 times per year     Active Member of Clubs or Organizations: Yes     Attends Club or Organization Meetings: Not on file     Marital Status:    Intimate Partner Violence: Not on file   Housing Stability: Low Risk      Unable to Pay for Housing in the Last Year: No     Number of Places Lived in the Last Year: 1     Unstable Housing in the Last Year: No          MEDICATIONS:  has a current medication list which includes the following prescription(s): acetaminophen, aspirin, atenolol, bupropion, calcium carb-cholecalciferol, citalopram, freestyle juan 2 reader, freestyle juan 14 day sensor, freestyle juan 2 sensor, cyanocobalamin, famotidine, ferrous sulfate, finasteride, fluticasone, glipizide, ibuprofen, insulin  "glargine, lantus solostar, latanoprost, levoxyl, losartan, metformin, multi-vitamin, ondansetron, pantoprazole, polyethylene glycol, senna-docusate, simvastatin, urea, and cholecalciferol.    ROS     ROS: 10 point ROS neg other than the symptoms noted above in the HPI.    PE:  /67 (BP Location: Left arm, Patient Position: Chair, Cuff Size: Adult Large)   Pulse 67   Temp 97.4  F (36.3  C) (Tympanic)   Resp 16   Ht 1.664 m (5' 5.51\")   Wt 105.4 kg (232 lb 4.8 oz)   SpO2 97%   BMI 38.05 kg/m    GENERAL: healthy, alert and no distress  EYES: Eyes grossly normal to inspection, conjunctivae and sclerae normal  ENT: no nose swelling, nasal discharge.  Thyroid: no apparent thyroid nodules  RESP: no audible wheeze, cough, or visible cyanosis.  No visible retractions or increased work of breathing.  Able to speak fully in complete sentences.  ABDO: not evaluated.  EXTREMITIES: no hand tremors.  NEURO: Cranial nerves grossly intact, mentation intact and speech normal  SKIN: No apparent skin lesions, rash or edema seen   PSYCH: mentation appears normal, affect normal/bright, judgement and insight intact, normal speech and appearance well-groomed.  DM FOOT EXAM: Markedly decreased sensory exam and normal monofilament exam on bilateral lower extremity.    LABS:    Component      Latest Ref Rng & Units 8/29/2019   Glucose 316   C-Peptide      0.9 - 6.9 ng/mL 2.5     A1c:  Lab Results   Component Value Date    A1C 8.1 01/27/2023    A1C 9.3 01/07/2023    A1C 9.2 09/01/2022    A1C 8.6 03/21/2022    A1C 9.7 07/30/2021    A1C 7.4 02/22/2021    A1C 9.9 03/12/2020    A1C 11.7 12/05/2019    A1C 11.6 08/29/2019    A1C 11.8 12/14/2018     Basic Metabolic Panel:  Creatinine   Date Value Ref Range Status   02/14/2023 1.31 (H) 0.67 - 1.17 mg/dL Final   01/16/2020 1.01 0.66 - 1.25 mg/dL Final     Urine microabumin:  Lab Results   Component Value Date    UMALCR 116.17 02/06/2023    UMALCR 120.00 07/30/2021    UMALCR 81.46 03/05/2020 "          LFTS/Cholesterol Panel:  Recent Labs   Lab Test 01/27/23  1126 01/08/23  0549 05/19/16  0804 05/21/15  1104   CHOL 127 114   < > 137   HDL 33* 30*   < > 30*   LDL 71 64   < > 78   TRIG 113 99   < > 144   CHOLHDLRATIO  --   --   --  4.6    < > = values in this interval not displayed.       Thyroid Function:  ENDO THYROID LABS-[facility] Latest Ref Rng & Units 1/27/2023   THYROID STIMULATING HORMONE (R) 0.30 - 4.20 uIU/mL 0.78   FREE T3 2.3 - 4.2 pg/mL    FREE T4 0.90 - 1.70 ng/dL 1.65     Component    Latest Ref Rng & Units 10/19/2018   Thyroid Peroxidase Antibody    <35 IU/mL 11   Thyroglobulin Antibody    <40 IU/mL <20   Thyroid Stim Immunog    <=1.3 TSI index <1.0     All pertinent notes, labs, and images personally reviewed by me.     A/P  Mr.Walter Morgan is a 62 year old evaluated via phone visit for the management of:    1. DM2 - Under good control. A1c 8.1%.  Diabetes is complicated by neuropathy and nephropathy/elevated urine microalbumin.  He is followed by nephrology.  He has challenges with blood sugar testing due to limited use of his hands (neuropathy + tremor - both significant) and increasing difficulty with ADL's   Can't test his blood sugars using a meter due to the above and will benefit from use of continuous glucose monitor.  Blood sugar has started to increase since his discharge from nursing home.    Plan:  Discussed diagnosis, pathophysiology, management and treatment options of condition with pt.  I also discussed importance of strict blood sugar control to prevent complications associated with uncontrolled diabetes.  Continue Metformin 1000 mg twice a day and Glipizide XL 10 mg/day.  Lantus -- increase to 25 units at night.  Start Jardiance 10 mg/day. (In the setting of CKD and microalbuminuria). we will continue to monitor potassium levels closely and recommend repeat labs in 2 weeks after starting Jardiance.  Most recent potassium levels in normal range.  Continue to monitor kidney  function closely and adjust metformin dose accordingly.  Continue to use juan.  Labs and follow-up in 3 months.  Please make a lab appointment for blood work and follow up clinic appointment in 1 week after that to discuss results.  Neurology referral in place for further evaluation of neuropathy.      2. Hypothyroidism.  Currently treated with Levoxyl (KAMERON) 137 mcg/day.  Clinically and biochemically euthyroid.   Labs in range.  Plan:  Discussed diagnosis, pathophysiology, management and treatment options of condition with pt.  Continue Levoxyl 137 mcg/day.   Labs in 1 year or sooner if concerns.    Discussed s/s of hypothyroidism and hyperthyroidism to watch for.  The patient indicates understanding of these issues and agrees with the plan.    3. Hyperlipidemia - On statin therapy. Managed by PCP. Not discussed.    4. Prevention:  Opthalmology-Yes: recommend annually  Dental-Yes:recommend twice a year  ASA-Yes, 81 mg qd   Smoking- No    Most Recent Immunizations   Administered Date(s) Administered     COVID-19 Vaccine (ACE*COMM) 03/25/2021     COVID-19 Vaccine 12+ (Pfizer) 11/06/2021     COVID-19 Vaccine Bivalent Booster 12+ (Pfizer) 10/15/2022     COVID-19,PF,Moderna Booster 04/12/2022     DTaP, Unspecified 11/08/2010     Flu, Unspecified 11/15/2016     V5l7-82 Novel Flu 11/04/2009     HepA-Adult 04/17/2019     Influenza (H1N1) 11/04/2009     Influenza (IIV3) PF 10/01/2012     Influenza Vaccine 50-64 or 18-64 w/egg allergy (Flublok) 10/15/2022     Influenza Vaccine >6 months (Alfuria,Fluzone) 09/07/2017     Influenza Vaccine, 6+MO IM (QUADRIVALENT W/PRESERVATIVES) 11/15/2016     MMR 03/21/1995     Pneumococcal 20 valent Conjugate (Prevnar 20) 06/15/2022     Pneumococcal 23 valent 10/05/2012     TD (ADULT, 7+) 03/21/1995     TDAP Vaccine (Adacel) 11/08/2010     Tdap (Adacel,Boostrix) 09/24/2021     Typhoid IM 10/16/2018     Zoster vaccine recombinant adjuvanted (SHINGRIX) 12/03/2020     Follow-up:  3  months.    Cindi Meraz MD  Endocrinology   [facility]/[place]  CC: Lisa Pierre      Answers for HPI/ROS submitted by the patient on 2/13/2023  General Symptoms: No  Skin Symptoms: Yes  HENT Symptoms: No  EYE SYMPTOMS: Yes  HEART SYMPTOMS: No  LUNG SYMPTOMS: Yes  INTESTINAL SYMPTOMS: Yes  URINARY SYMPTOMS: No  REPRODUCTIVE SYMPTOMS: No  SKELETAL SYMPTOMS: No  BLOOD SYMPTOMS: No  NERVOUS SYSTEM SYMPTOMS: Yes  MENTAL HEALTH SYMPTOMS: No  Changes in hair: No  Changes in moles/birth marks: No  Itching: No  Rashes: No  Changes in nails: No  Acne: No  Change in facial hair: No  Warts: No  Non-healing sores: No  Scarring: No  Flaking of skin: No  Color changes of hands/feet in cold : No  Sun sensitivity: No  Skin thickening: No  Eye pain: No  Vision loss: No  Dry eyes: No  Watery eyes: No  Eye bulging: No  Double vision: No  Flashing of lights: No  Spots: No  Floaters: Yes  Redness: No  Crossed eyes: No  Tunnel Vision: No  Yellowing of eyes: No  Eye irritation: No  Cough: Yes  Sputum or phlegm: No  Coughing up blood: No  Difficulty breating or shortness of breath: No  Snoring: No  Wheezing: No  Difficulty breathing on exertion: No  Nighttime Cough: Yes  Difficulty breathing when lying flat: No  Heart burn or indigestion: No  Nausea: No  Vomiting: Yes  Abdominal pain: No  Bloating: No  Constipation: No  Diarrhea: No  Blood in stool: No  Black stools: No  Rectal or Anal pain: No  Fecal incontinence: No  Yellowing of skin or eyes: No  Vomit with blood: No  Change in stools: No  Trouble with coordination: No  Dizziness or trouble with balance: Yes  Fainting or black-out spells: No  Memory loss: Yes  Headache: Yes  Seizures: No  Speech problems: No  Tingling: No  Tremor: Yes  Weakness: No  Difficulty walking: No  Paralysis: No  Numbness: Yes

## 2023-02-15 NOTE — LETTER
"    2/15/2023         RE: Steve Morgan  [id number]  [facility] [address]        Dear Colleague,    Thank you for referring your patient, Steve Morgan, to the [facility]. Please see a copy of my visit note below.    Name: Steve \"Caio\" Cathy  F/u for Diabetes and hypothyroidism.  HPI:  For f/u of DM.    has a past medical history of Cellulitis and abscess of trunk (6/27/2017), Depression, Hyperlipidemia LDL goal <100 (10/31/2010), Hypertension goal BP (blood pressure) < 140/90 (3/17/2011), Hypothyroidism (1/12/2010), Morbid obesity due to excess calories (H) (1/12/2010), Neuropathy in diabetes (H) (1/12/2010), Obesity (1/12/2010), Sleep apnea (1/12/2010), and Type 2 diabetes, HbA1C goal < 8% (H) (3/8/2011).    He has challenges with blood sugar testing due to limited use of his hands (neuropathy + tremor - both significant) and increasing difficulty with ADL's.  Can't test his blood sugars using a meter due to the above.   He also has an extremely hard time inserting the Nina sensors due to neuropathy and tremor.  History of poor compliance in past.  Was started on Novolog in past but not currently taking it.  He has stopping drinking pop since Sep 2021.  Exercise is limited.  CKD stage 2- followed by nephrology.  Most recent creatinine 1.31.  Noted recent hospitalization in December 2022 for nonhealing chronic wound to right third finger and underlying osteomyelitis.  He underwent I&D and partial amputation and required IV antibiotics.  Following that he was discharged to nursing home and he was there from December 29-January 18.  He was having few episodes of hypoglycemia during that time.  Since he is back home blood sugars are on higher side.  Earlier noted mild hyperkalemia which since has resolved.  Most recent potassium levels are in normal range.  Diet- trying to eat healthy.  Lost some weight.  Wt Readings from Last 2 Encounters:   02/15/23 105.4 kg (232 lb 4.8 oz) "   01/27/23 107.8 kg (237 lb 9.6 oz)     1. Type 2 DM:  Orginally diagnosed at the age of 33 or 38.  Was prediabetic prior, was not particularly symptomatic at the time, was noted on routine lab work    Current Regimen:   Metformin 1000 mg BID  Glipizide XL 10 mg/day  Lantus 22 units at night    The plan was to start Trulicity but the copay was not affordable ($100 per refill).    yes:     Diabetes Medication(s)     Biguanides       metFORMIN (GLUCOPHAGE) 1000 MG tablet    Take 1 tablet (1,000 mg) by mouth 2 times daily (with meals)    Insulin       insulin glargine (LANTUS VIAL) 100 UNIT/ML vial    Inject 22 Units Subcutaneous At Bedtime     LANTUS SOLOSTAR 100 UNIT/ML soln        Sulfonylureas       glipiZIDE (GLUCOTROL XL) 10 MG 24 hr tablet    Take 1 tablet (10 mg) by mouth daily        BS checks: Mirimus  Nina Download: Blood glucose data reviewed personally. See nursing note from this encounter for details.  Unable to use glucose meter - he has a hard time doing fingerstick testing due to his tremor.    Complications:   Diabetes Complications  Description / Detail    Diabetic Retinopathy  No retinopathy,last exam 2/2020, [facility]   CAD / PAD  No   Neuropathy  Yes + diabetic neuropathy: numbness hands and feet.  Saw podiatry, Dr. Mathis, 1/27/2018- using diabetic shoes   Nephropathy / Microalbuminuria  Yes, elevated urine microalbumin   Gastroparesis  No   Hypoglycemia [place]  Not sure, gets 'kind of dizzy' when blood sugar is low but reports history of low blood sugars without symptoms     2. Hypertension: Blood Pressure:   BP Readings from Last 3 Encounters:   02/15/23 121/67   02/14/23 110/64   01/27/23 108/68   Blood pressure medications include atenolol 25 mg qd and losartan 50 mg every day.   3. Hyperlipidemia: Takes simvastatin 20 mg for lipid control.       4.  Hypothyroidism. Currently treated with levoxyl (KAMERON) 137 mcg daily. Takes the levoxyl first thing in the morning and waits 30+ minutes before  eating breakfast.   1/2023 labs in range.  PMH/PSH:  Past Medical History:   Diagnosis Date     Cellulitis and abscess of trunk 6/27/2017     Depression      Hyperlipidemia LDL goal <100 10/31/2010     Hypertension goal BP (blood pressure) < 140/90 3/17/2011     Hypothyroidism 1/12/2010     Morbid obesity due to excess calories (H) 1/12/2010     Neuropathy in diabetes (H) 1/12/2010     Obesity 1/12/2010     Sleep apnea 1/12/2010    CPAP     Type 2 diabetes, HbA1C goal < 8% (H) 3/8/2011     Past Surgical History:   Procedure Laterality Date     BIOPSY       COLONOSCOPY       EYE SURGERY       GENITOURINARY SURGERY      surg for undescended testicle     IRRIGATION AND DEBRIDEMENT HAND, COMBINED Right 12/23/2022    Procedure: Right long finger irrigation and debridement with partial amputation of the right long finger. ;  Surgeon: Colby English MD;  Location: [facility] OR     REPAIR HAMMER TOE BILATERAL  5/16/2013    Procedure: REPAIR HAMMER TOE BILATERAL;  Flexor Tenotomy Toes 2,3,4,5 Bilateral Feet;  Surgeon: Saad Bangura DPM;  Location: [facility] OR     Family Hx:  Family History   Problem Relation Age of Onset     Breast Cancer Mother      Hypertension Mother      Thyroid Disease Mother      Depression Mother      Alzheimer Disease Mother 82     Cancer - colorectal Father      Thyroid Disease Father      Depression Father      Other - See Comments Father         bladder polyps     Heart Disease Maternal Grandmother         CHF     Circulatory Paternal Grandmother      Cancer Paternal Grandfather      Diabetes Brother      Diabetes Brother      Asthma Brother      Thyroid disease: Yes: mother         DM2: Yes: one brother with type 1 diabetes and one brother with type 2 diabetes, paternal aunt with DM2         Autoimmune: DM1, SLE, RA, Vitiligo Yes: brother with DM1    Social Hx:  Social History     Socioeconomic History     Marital status:      Spouse name: Sri     Number of children: 2     Years of  education: Not on file     Highest education level: Associate degree: occupational, technical, or vocational program   Occupational History     Occupation:      Employer: NONE      Comment: [employer]   Tobacco Use     Smoking status: Never     Smokeless tobacco: Never   Vaping Use     Vaping Use: Never used   Substance and Sexual Activity     Alcohol use: Yes     Comment: Occassionally     Drug use: No     Sexual activity: Not Currently     Partners: Female     Birth control/protection: Abstinence   Other Topics Concern     Parent/sibling w/ CABG, MI or angioplasty before 65F 55M? No   Social History Narrative     Not on file     Social Determinants of Health     Financial Resource Strain: Low Risk      Difficulty of Paying Living Expenses: Not hard at all   Food Insecurity: No Food Insecurity     Worried About Running Out of Food in the Last Year: Never true     Ran Out of Food in the Last Year: Never true   Transportation Needs: No Transportation Needs     Lack of Transportation (Medical): No     Lack of Transportation (Non-Medical): No   Physical Activity: Inactive     Days of Exercise per Week: 0 days     Minutes of Exercise per Session: 0 min   Stress: No Stress Concern Present     Feeling of Stress : Not at all   Social Connections: Moderately Integrated     Frequency of Communication with Friends and Family: Once a week     Frequency of Social Gatherings with Friends and Family: Once a week     Attends Congregational Services: 1 to 4 times per year     Active Member of Clubs or Organizations: Yes     Attends Club or Organization Meetings: Not on file     Marital Status:    Intimate Partner Violence: Not on file   Housing Stability: Low Risk      Unable to Pay for Housing in the Last Year: No     Number of Places Lived in the Last Year: 1     Unstable Housing in the Last Year: No          MEDICATIONS:  has a current medication list which includes the following prescription(s): acetaminophen,  "aspirin, atenolol, bupropion, calcium carb-cholecalciferol, citalopram, freestyle juan 2 reader, freestyle juan 14 day sensor, freestyle juan 2 sensor, cyanocobalamin, famotidine, ferrous sulfate, finasteride, fluticasone, glipizide, ibuprofen, insulin glargine, lantus solostar, latanoprost, levoxyl, losartan, metformin, multi-vitamin, ondansetron, pantoprazole, polyethylene glycol, senna-docusate, simvastatin, urea, and cholecalciferol.    ROS     ROS: 10 point ROS neg other than the symptoms noted above in the HPI.    PE:  /67 (BP Location: Left arm, Patient Position: Chair, Cuff Size: Adult Large)   Pulse 67   Temp 97.4  F (36.3  C) (Tympanic)   Resp 16   Ht 1.664 m (5' 5.51\")   Wt 105.4 kg (232 lb 4.8 oz)   SpO2 97%   BMI 38.05 kg/m    GENERAL: healthy, alert and no distress  EYES: Eyes grossly normal to inspection, conjunctivae and sclerae normal  ENT: no nose swelling, nasal discharge.  Thyroid: no apparent thyroid nodules  RESP: no audible wheeze, cough, or visible cyanosis.  No visible retractions or increased work of breathing.  Able to speak fully in complete sentences.  ABDO: not evaluated.  EXTREMITIES: no hand tremors.  NEURO: Cranial nerves grossly intact, mentation intact and speech normal  SKIN: No apparent skin lesions, rash or edema seen   PSYCH: mentation appears normal, affect normal/bright, judgement and insight intact, normal speech and appearance well-groomed.  DM FOOT EXAM: Markedly decreased sensory exam and normal monofilament exam on bilateral lower extremity.    LABS:    Component      Latest Ref Rng & Units 8/29/2019   Glucose 316   C-Peptide      0.9 - 6.9 ng/mL 2.5     A1c:  Lab Results   Component Value Date    A1C 8.1 01/27/2023    A1C 9.3 01/07/2023    A1C 9.2 09/01/2022    A1C 8.6 03/21/2022    A1C 9.7 07/30/2021    A1C 7.4 02/22/2021    A1C 9.9 03/12/2020    A1C 11.7 12/05/2019    A1C 11.6 08/29/2019    A1C 11.8 12/14/2018     Basic Metabolic Panel:  Creatinine "   Date Value Ref Range Status   02/14/2023 1.31 (H) 0.67 - 1.17 mg/dL Final   01/16/2020 1.01 0.66 - 1.25 mg/dL Final     Urine microabumin:  Lab Results   Component Value Date    UMALCR 116.17 02/06/2023    UMALCR 120.00 07/30/2021    UMALCR 81.46 03/05/2020          LFTS/Cholesterol Panel:  Recent Labs   Lab Test 01/27/23  1126 01/08/23  0549 05/19/16  0804 05/21/15  1104   CHOL 127 114   < > 137   HDL 33* 30*   < > 30*   LDL 71 64   < > 78   TRIG 113 99   < > 144   CHOLHDLRATIO  --   --   --  4.6    < > = values in this interval not displayed.       Thyroid Function:  ENDO THYROID LABS-P Latest Ref Rng & Units 1/27/2023   THYROID STIMULATING HORMONE (R) 0.30 - 4.20 uIU/mL 0.78   FREE T3 2.3 - 4.2 pg/mL    FREE T4 0.90 - 1.70 ng/dL 1.65     Component    Latest Ref Rng & Units 10/19/2018   Thyroid Peroxidase Antibody    <35 IU/mL 11   Thyroglobulin Antibody    <40 IU/mL <20   Thyroid Stim Immunog    <=1.3 TSI index <1.0     All pertinent notes, labs, and images personally reviewed by me.     A/P  Mr.Walter Morgan is a 62 year old evaluated via phone visit for the management of:    1. DM2 - Under good control. A1c 8.1%.  Diabetes is complicated by neuropathy and nephropathy/elevated urine microalbumin.  He is followed by nephrology.  He has challenges with blood sugar testing due to limited use of his hands (neuropathy + tremor - both significant) and increasing difficulty with ADL's   Can't test his blood sugars using a meter due to the above and will benefit from use of continuous glucose monitor.  Blood sugar has started to increase since his discharge from nursing home.    Plan:  Discussed diagnosis, pathophysiology, management and treatment options of condition with pt.  I also discussed importance of strict blood sugar control to prevent complications associated with uncontrolled diabetes.  Continue Metformin 1000 mg twice a day and Glipizide XL 10 mg/day.  Lantus -- increase to 25 units at night.  Start  Jardiance 10 mg/day. (In the setting of CKD and microalbuminuria). we will continue to monitor potassium levels closely and recommend repeat labs in 2 weeks after starting Jardiance.  Most recent potassium levels in normal range.  Continue to monitor kidney function closely and adjust metformin dose accordingly.  Continue to use juan.  Labs and follow-up in 3 months.  Please make a lab appointment for blood work and follow up clinic appointment in 1 week after that to discuss results.  Neurology referral in place for further evaluation of neuropathy.      2. Hypothyroidism.  Currently treated with Levoxyl (KAMERON) 137 mcg/day.  Clinically and biochemically euthyroid.   Labs in range.  Plan:  Discussed diagnosis, pathophysiology, management and treatment options of condition with pt.  Continue Levoxyl 137 mcg/day.   Labs in 1 year or sooner if concerns.    Discussed s/s of hypothyroidism and hyperthyroidism to watch for.  The patient indicates understanding of these issues and agrees with the plan.    3. Hyperlipidemia - On statin therapy. Managed by PCP. Not discussed.    4. Prevention:  Opthalmology-Yes: recommend annually  Dental-Yes:recommend twice a year  ASA-Yes, 81 mg qd   Smoking- No    Most Recent Immunizations   Administered Date(s) Administered     COVID-19 Vaccine (Vi) 03/25/2021     COVID-19 Vaccine 12+ (Pfizer) 11/06/2021     COVID-19 Vaccine Bivalent Booster 12+ (Pfizer) 10/15/2022     COVID-19,PF,Moderna Booster 04/12/2022     DTaP, Unspecified 11/08/2010     Flu, Unspecified 11/15/2016     U2h8-60 Novel Flu 11/04/2009     HepA-Adult 04/17/2019     Influenza (H1N1) 11/04/2009     Influenza (IIV3) PF 10/01/2012     Influenza Vaccine 50-64 or 18-64 w/egg allergy (Flublok) 10/15/2022     Influenza Vaccine >6 months (Alfuria,Fluzone) 09/07/2017     Influenza Vaccine, 6+MO IM (QUADRIVALENT W/PRESERVATIVES) 11/15/2016     MMR 03/21/1995     Pneumococcal 20 valent Conjugate (Prevnar 20) 06/15/2022      Pneumococcal 23 valent 10/05/2012     TD (ADULT, 7+) 03/21/1995     TDAP Vaccine (Adacel) 11/08/2010     Tdap (Adacel,Boostrix) 09/24/2021     Typhoid IM 10/16/2018     Zoster vaccine recombinant adjuvanted (SHINGRIX) 12/03/2020     Follow-up:  3 months.    Cindi Meraz MD  Endocrinology   [facility]  CC: Lisa Pierre      Answers for HPI/ROS submitted by the patient on 2/13/2023  General Symptoms: No  Skin Symptoms: Yes  HENT Symptoms: No  EYE SYMPTOMS: Yes  HEART SYMPTOMS: No  LUNG SYMPTOMS: Yes  INTESTINAL SYMPTOMS: Yes  URINARY SYMPTOMS: No  REPRODUCTIVE SYMPTOMS: No  SKELETAL SYMPTOMS: No  BLOOD SYMPTOMS: No  NERVOUS SYSTEM SYMPTOMS: Yes  MENTAL HEALTH SYMPTOMS: No  Changes in hair: No  Changes in moles/birth marks: No  Itching: No  Rashes: No  Changes in nails: No  Acne: No  Change in facial hair: No  Warts: No  Non-healing sores: No  Scarring: No  Flaking of skin: No  Color changes of hands/feet in cold : No  Sun sensitivity: No  Skin thickening: No  Eye pain: No  Vision loss: No  Dry eyes: No  Watery eyes: No  Eye bulging: No  Double vision: No  Flashing of lights: No  Spots: No  Floaters: Yes  Redness: No  Crossed eyes: No  Tunnel Vision: No  Yellowing of eyes: No  Eye irritation: No  Cough: Yes  Sputum or phlegm: No  Coughing up blood: No  Difficulty breating or shortness of breath: No  Snoring: No  Wheezing: No  Difficulty breathing on exertion: No  Nighttime Cough: Yes  Difficulty breathing when lying flat: No  Heart burn or indigestion: No  Nausea: No  Vomiting: Yes  Abdominal pain: No  Bloating: No  Constipation: No  Diarrhea: No  Blood in stool: No  Black stools: No  Rectal or Anal pain: No  Fecal incontinence: No  Yellowing of skin or eyes: No  Vomit with blood: No  Change in stools: No  Trouble with coordination: No  Dizziness or trouble with balance: Yes  Fainting or black-out spells: No  Memory loss: Yes  Headache: Yes  Seizures: No  Speech problems: No  Tingling:  No  Tremor: Yes  Weakness: No  Difficulty walking: No  Paralysis: No  Numbness: Yes          Again, thank you for allowing me to participate in the care of your patient.        Sincerely,        Cindi Meraz MD

## 2023-02-16 DIAGNOSIS — E87.1 HYPONATREMIA: Primary | ICD-10-CM

## 2023-02-16 NOTE — TELEPHONE ENCOUNTER
I called th ept and informed of the below. The pt states that he is not picking up the jardiance because it was 500$.

## 2023-02-16 NOTE — TELEPHONE ENCOUNTER
Canagliflozin (Invokana), Dapagliflozin (Farxiga), empagliflozin (Jardiance),ertugliflozin (Steglatro) - these are other alternatives.  Please check cost with insurance.

## 2023-02-21 NOTE — TELEPHONE ENCOUNTER
Jardiance 30 days $ 487.32       90 days $509.32   Invokana 30 days $ 575.92     90 days $1,250.95   Steglatro not covered

## 2023-02-21 NOTE — TELEPHONE ENCOUNTER
Is he open to any of those medication?  Or cost is a limiting factor for all of SGLT-2 inh medication?

## 2023-02-23 ENCOUNTER — MYC MEDICAL ADVICE (OUTPATIENT)
Dept: ENDOCRINOLOGY | Facility: CLINIC | Age: 63
End: 2023-02-23
Payer: MEDICARE

## 2023-02-28 PROBLEM — M25.649 FINGER STIFFNESS: Status: RESOLVED | Noted: 2022-12-11 | Resolved: 2023-02-28

## 2023-03-01 ENCOUNTER — TRANSFERRED RECORDS (OUTPATIENT)
Dept: MULTI SPECIALTY CLINIC | Facility: CLINIC | Age: 63
End: 2023-03-01

## 2023-03-01 LAB — RETINOPATHY: NEGATIVE

## 2023-03-03 ENCOUNTER — TELEPHONE (OUTPATIENT)
Dept: ENDOCRINOLOGY | Facility: CLINIC | Age: 63
End: 2023-03-03
Payer: MEDICARE

## 2023-03-03 NOTE — TELEPHONE ENCOUNTER
Please see 2/23/23 message. Pt is unable to pay for these medications. I informed I will let the MD know.

## 2023-03-03 NOTE — TELEPHONE ENCOUNTER
[facility] Call Center    Phone Message    May a detailed message be left on voicemail: yes     Reason for Call: Other: Patient called in and needs a return call to discuss medication prices.  Please reach out to discuss with the patient.     Action Taken: Other: Endo    Travel Screening: Not Applicable

## 2023-03-06 ENCOUNTER — LAB (OUTPATIENT)
Dept: LAB | Facility: CLINIC | Age: 63
End: 2023-03-06
Payer: MEDICARE

## 2023-03-06 DIAGNOSIS — E11.40 TYPE 2 DIABETES MELLITUS WITH DIABETIC NEUROPATHY, WITH LONG-TERM CURRENT USE OF INSULIN (H): ICD-10-CM

## 2023-03-06 DIAGNOSIS — E87.1 HYPONATREMIA: ICD-10-CM

## 2023-03-06 DIAGNOSIS — Z79.4 TYPE 2 DIABETES MELLITUS WITH DIABETIC NEUROPATHY, WITH LONG-TERM CURRENT USE OF INSULIN (H): ICD-10-CM

## 2023-03-06 LAB
ANION GAP SERPL CALCULATED.3IONS-SCNC: 12 MMOL/L (ref 7–15)
BUN SERPL-MCNC: 19.5 MG/DL (ref 8–23)
CALCIUM SERPL-MCNC: 9.8 MG/DL (ref 8.8–10.2)
CHLORIDE SERPL-SCNC: 102 MMOL/L (ref 98–107)
CREAT SERPL-MCNC: 1.44 MG/DL (ref 0.67–1.17)
DEPRECATED HCO3 PLAS-SCNC: 22 MMOL/L (ref 22–29)
GFR SERPL CREATININE-BSD FRML MDRD: 54 ML/MIN/1.73M2
GLUCOSE SERPL-MCNC: 135 MG/DL (ref 70–99)
POTASSIUM SERPL-SCNC: 5.1 MMOL/L (ref 3.4–5.3)
SODIUM SERPL-SCNC: 136 MMOL/L (ref 136–145)

## 2023-03-06 PROCEDURE — 36415 COLL VENOUS BLD VENIPUNCTURE: CPT

## 2023-03-06 PROCEDURE — 80048 BASIC METABOLIC PNL TOTAL CA: CPT

## 2023-03-10 ENCOUNTER — OFFICE VISIT (OUTPATIENT)
Dept: FAMILY MEDICINE | Facility: CLINIC | Age: 63
End: 2023-03-10
Payer: MEDICARE

## 2023-03-10 ENCOUNTER — TELEPHONE (OUTPATIENT)
Dept: FAMILY MEDICINE | Facility: CLINIC | Age: 63
End: 2023-03-10

## 2023-03-10 VITALS
TEMPERATURE: 97.6 F | OXYGEN SATURATION: 97 % | SYSTOLIC BLOOD PRESSURE: 121 MMHG | HEIGHT: 65 IN | HEART RATE: 84 BPM | DIASTOLIC BLOOD PRESSURE: 65 MMHG | WEIGHT: 233.13 LBS | BODY MASS INDEX: 38.84 KG/M2

## 2023-03-10 DIAGNOSIS — L98.491 SKIN ULCER OF HAND, LIMITED TO BREAKDOWN OF SKIN (H): ICD-10-CM

## 2023-03-10 DIAGNOSIS — M24.549 CONTRACTURE OF HAND: ICD-10-CM

## 2023-03-10 DIAGNOSIS — F33.1 MODERATE EPISODE OF RECURRENT MAJOR DEPRESSIVE DISORDER (H): Primary | ICD-10-CM

## 2023-03-10 DIAGNOSIS — G60.9 IDIOPATHIC PERIPHERAL NEUROPATHY: ICD-10-CM

## 2023-03-10 DIAGNOSIS — F33.1 MODERATE RECURRENT MAJOR DEPRESSION (H): Primary | ICD-10-CM

## 2023-03-10 DIAGNOSIS — Z78.9 DEFICIT IN ACTIVITIES OF DAILY LIVING (ADL): ICD-10-CM

## 2023-03-10 DIAGNOSIS — Z74.1 SELF-CARE DEFICIT FOR TOILETING: ICD-10-CM

## 2023-03-10 PROCEDURE — 99214 OFFICE O/P EST MOD 30 MIN: CPT | Performed by: PHYSICIAN ASSISTANT

## 2023-03-10 RX ORDER — BUPROPION HYDROCHLORIDE 150 MG/1
150 TABLET ORAL EVERY MORNING
Qty: 90 TABLET | Refills: 1 | Status: SHIPPED | OUTPATIENT
Start: 2023-03-10 | End: 2023-04-18

## 2023-03-10 ASSESSMENT — ANXIETY QUESTIONNAIRES
3. WORRYING TOO MUCH ABOUT DIFFERENT THINGS: NOT AT ALL
8. IF YOU CHECKED OFF ANY PROBLEMS, HOW DIFFICULT HAVE THESE MADE IT FOR YOU TO DO YOUR WORK, TAKE CARE OF THINGS AT HOME, OR GET ALONG WITH OTHER PEOPLE?: SOMEWHAT DIFFICULT
GAD7 TOTAL SCORE: 5
GAD7 TOTAL SCORE: 5
7. FEELING AFRAID AS IF SOMETHING AWFUL MIGHT HAPPEN: NOT AT ALL
6. BECOMING EASILY ANNOYED OR IRRITABLE: NEARLY EVERY DAY
5. BEING SO RESTLESS THAT IT IS HARD TO SIT STILL: NOT AT ALL
1. FEELING NERVOUS, ANXIOUS, OR ON EDGE: SEVERAL DAYS
4. TROUBLE RELAXING: SEVERAL DAYS
2. NOT BEING ABLE TO STOP OR CONTROL WORRYING: NOT AT ALL
7. FEELING AFRAID AS IF SOMETHING AWFUL MIGHT HAPPEN: NOT AT ALL
GAD7 TOTAL SCORE: 5
IF YOU CHECKED OFF ANY PROBLEMS ON THIS QUESTIONNAIRE, HOW DIFFICULT HAVE THESE PROBLEMS MADE IT FOR YOU TO DO YOUR WORK, TAKE CARE OF THINGS AT HOME, OR GET ALONG WITH OTHER PEOPLE: SOMEWHAT DIFFICULT

## 2023-03-10 NOTE — TELEPHONE ENCOUNTER
Rx of Wellbutrin 150 mg sent to pharmacy for patient. Take 1 tablet daily, it will help with your mood.    I forgot to give Caio the information for patient assistance program. They may be able to help him with the cost of meds or see what he qualifies for.     He will need to contact:    Luann Rosen, patient assistance program.  [phone]

## 2023-03-10 NOTE — TELEPHONE ENCOUNTER
Lisa Pierre PA-C-    Spoke to LUCHO Browning who reports Dr. Casey said patient could restart either Cymbalta or Wellbutrin per Lisa's recommendation on the best option.    Nallely spoke with Caio regarding increase in creatinine. Nallely reports she previously discussed increase with Caio, patient advised may be due to jardiance and follow-up with repeat labs/hydrate. However, patient is on fluid restriction & has not started Jardiance at this time so Nallely will discuss further with Dr. Casey and follow-up with patient on Monday.  -Patient informed he also did not  medication due to cost.     Yudith GARCIA RN, BSN, PHN - PAL (patient advocate liaison)  [facility]  [phone]

## 2023-03-10 NOTE — TELEPHONE ENCOUNTER
Spoke to Nallely, RN with Dr. Casey at [facility] to inform of below. Nallely will discuss with doctor Carmela and return call to this RN, direct number provided.     Requested Nallely reach out to patient in regards to most recent labs and increase in creatinine. Nallely informs she discussed lab results with Caio previously, will follow-up with patient.     Per Lisa- Patient still has wounds on hands, advised to treat at home for one week. If not improved, would like patient to return to wound clinic. Reminder sent to this RN to follow up in one week.       Yudith GARCIA RN, BSN, PHN - PAL (patient advocate liaison)  [facility]  [phone]

## 2023-03-10 NOTE — TELEPHONE ENCOUNTER
Patient's depression is worsening without medication for mood. Patient now reporting passive suicidal ideation.    I would like to start him back on something for mood.  I know Celexa and Wellbutrin were stopped due to SIADH.     I would like to restart patient back on Wellbutrin or start Cymbalta (still some risk of SIADH, but lower), but would like Dr. Casey's ([facility]) input on this first.     Can we call over the nephrology to ask?    Also, Caio was concerned he has not heard back about his creatinine drawn on 3/6/23. It had gone up to 1.44 and he states he liss allsuzanne hears from them right away.

## 2023-03-10 NOTE — TELEPHONE ENCOUNTER
Spoke to patient and informed rx sent. Patient requested mychart message with patient assistance information. Mychart message sent.     Patient did inform Dr. Kendall he was unable to afford medication. See telephone encounter 3/3/23    Yudith GARCIA RN, BSN, PHN - PAL (patient advocate liaison)  [facility]  [phone]

## 2023-03-13 NOTE — TELEPHONE ENCOUNTER
As noted in previous encounters Jardiance is expensive.  Other SGLT2 inhibitors are also expensive.  In the setting of high blood sugar recommend to increase insulin (Lantus) 30 units/day.  In last clinic visit dose was increased to 25 units.    Lab Results   Component Value Date    A1C 8.1 01/27/2023    A1C 9.3 01/07/2023    A1C 9.2 09/01/2022    A1C 8.6 03/21/2022    A1C 9.7 07/30/2021    A1C 7.4 02/22/2021    A1C 9.9 03/12/2020    A1C 11.7 12/05/2019    A1C 11.6 08/29/2019    A1C 11.8 12/14/2018     yes:     Diabetes Medication(s)       Biguanides       metFORMIN (GLUCOPHAGE) 1000 MG tablet    Take 1 tablet (1,000 mg) by mouth 2 times daily (with meals)      Insulin       insulin glargine (BASAGLAR KWIKPEN) 100 UNIT/ML pen    Inject 30 Units Subcutaneous daily     insulin glargine (LANTUS VIAL) 100 UNIT/ML vial    Inject 22 Units Subcutaneous At Bedtime     LANTUS SOLOSTAR 100 UNIT/ML soln    Inject 25 Units Subcutaneous At Bedtime      Sodium-Glucose Co-Transporter 2 (SGLT2) Inhibitors       empagliflozin (JARDIANCE) 10 MG TABS tablet    Take 1 tablet (10 mg) by mouth daily     Patient not taking: Reported on 3/10/2023      Sulfonylureas       glipiZIDE (GLUCOTROL XL) 10 MG 24 hr tablet    Take 1 tablet (10 mg) by mouth daily

## 2023-03-13 NOTE — TELEPHONE ENCOUNTER
Spoke to pt and informed to increase to 30 units daily. Pt is going to upload juan in 1 week for provider to review.     Pt needs link sent to pt's email on file- (email was verified).

## 2023-03-17 ENCOUNTER — PATIENT OUTREACH (OUTPATIENT)
Dept: FAMILY MEDICINE | Facility: CLINIC | Age: 63
End: 2023-03-17
Payer: MEDICARE

## 2023-03-17 DIAGNOSIS — L98.491 SKIN ULCER OF HAND, LIMITED TO BREAKDOWN OF SKIN (H): ICD-10-CM

## 2023-03-17 DIAGNOSIS — M24.549 CONTRACTURE OF HAND: Primary | ICD-10-CM

## 2023-03-17 DIAGNOSIS — G60.9 IDIOPATHIC PERIPHERAL NEUROPATHY: ICD-10-CM

## 2023-03-17 NOTE — TELEPHONE ENCOUNTER
I do recommend wound care look at Caio's hands. I was hoping they would be cleared up. I just don't want things to progress to where they were previously.     I did sign referral.

## 2023-03-17 NOTE — TELEPHONE ENCOUNTER
Message left to return call to this RN PAL. Please transfer if available.    Direct number left for this RN PAL on message for return call.     Per Lisa Pierre, PALaC- follow-up with patient regarding wounds on hand. Recommends patient seen by wound clinic if not improved.     Yudith GARCIA RN, BSN, PHN, PAL (patient advocate liaison)   [facility]  [phone]

## 2023-03-17 NOTE — TELEPHONE ENCOUNTER
Spoke to patient to inform of recommendation & referral sent. Number for wound clinic provided to patient.    RN will follow-up with patient on Tuesday if patient has not returned call to inform regarding buproprion.     Yudith GARCIA RN, BSN, PHN - PAL (patient advocate liaison)  [facility]  [phone]

## 2023-03-17 NOTE — TELEPHONE ENCOUNTER
Lisa Pierre PA-C-    Please review, recommend referral to wound clinic? RN pended.     Spoke to patient who reports wounds on hands have improved some.   -Wound size is staying the same, starting to scab but remains open and red. Patient has been using the salve as discussed with CJ and putting a bandaid on wound.   -Patient feels he can continue treating at home and does not need the wound clinic at this time. Patient is agreeable to wound clinic, if recommended.     Patient informs Dr. Casey will send over labs.     Patient recently   buPROPion HCl WELLBUTRIN  MG Take 1 tablet (150 mg) by mouth every morning     Patient reports current dose of buproprion cost $151 for 90 days.   -Patient reports last buproprion dose was considerably less around $10-$15 per three months. Chart review, buproprion taper initiated in January otherwise patient previously on same dose or higher. Patient looked at previous prescription and reports it is the same dose.   -Advised patient contact insurance to find out if cost was higher due to patient not meeting deductible or if there is a cheaper alternative. Patient agreeable. Will return call to this RN.     Yudith GARCIA RN, BSN, PHN - PAL (patient advocate liaison)  [facility]  [phone]

## 2023-03-20 ENCOUNTER — TELEPHONE (OUTPATIENT)
Dept: WOUND CARE | Facility: CLINIC | Age: 63
End: 2023-03-20
Payer: MEDICARE

## 2023-03-20 NOTE — TELEPHONE ENCOUNTER
NOTE: RETURN PATIENT     Consult received via Workqueue from Lisa Pierre PA-C in  [facility]ILY PRACTICE for wound of the hand    Please schedule with Elaine Abarca at [facility] for next available appointment. RETURN PATIENT    **If scheduling with Elaine OCONNOR please schedule a follow up 2-3 weeks after initial appointment.    Is the patient able to make their own medical decisions? yes    Is patient a RAFAEL lift? PLEASE INQUIRE WHEN MAKING THE APPOINTMENT AND PUT IN APPOINTMENT NOTES    Routing to  Wound Healing Scheduling.

## 2023-03-21 NOTE — TELEPHONE ENCOUNTER
RN spoke to patient, he has not called insurance at this time. Advised patient call. RN will wait to hear from patient regarding medication.     Yudith GARCIA RN, BSN, PHN - PAL (patient advocate liaison)  [facility]  [phone]

## 2023-03-23 ENCOUNTER — TELEPHONE (OUTPATIENT)
Dept: ENDOCRINOLOGY | Facility: CLINIC | Age: 63
End: 2023-03-23
Payer: MEDICARE

## 2023-03-23 NOTE — TELEPHONE ENCOUNTER
[facility] Call Center    Phone Message    May a detailed message be left on voicemail: yes     Reason for Call: Other: Per pt is wondeirng if the appt on 03/30/23 is needed since he has another follow up in early May? Please call pt back to discuss. Thank you!      Action Taken: Message routed to:  [facility] ENDO    Travel Screening: Not Applicable

## 2023-03-27 ENCOUNTER — PATIENT OUTREACH (OUTPATIENT)
Dept: FAMILY MEDICINE | Facility: CLINIC | Age: 63
End: 2023-03-27
Payer: MEDICARE

## 2023-03-27 DIAGNOSIS — Z74.1 SELF-CARE DEFICIT FOR TOILETING: ICD-10-CM

## 2023-03-27 DIAGNOSIS — Z79.899 ENCOUNTER FOR LONG-TERM (CURRENT) USE OF MEDICATIONS: ICD-10-CM

## 2023-03-27 DIAGNOSIS — Z78.9 DEFICIT IN ACTIVITIES OF DAILY LIVING (ADL): ICD-10-CM

## 2023-03-27 DIAGNOSIS — M24.549 CONTRACTURE OF HAND: Primary | ICD-10-CM

## 2023-03-27 NOTE — TELEPHONE ENCOUNTER
Lisa Pierre PA-C-    Please review, RN pended referral as advised by [facility].     Patient calls to report he needs a different referral to OT related to hand therapy.     Call to [facility] for further clarification regarding patient request. Upon review noted that if therapy order was placed related to contracture, patient would need hand therapy order placed. If related to neuropathy, then current referral is all that is needed.   -RN pended new hand therapy referral.     Patient asks if Dr. Casey has placed future lab orders. No future lab orders noted. Patient will call to request orders from Dr. Casey.     Yudith GARCIA RN, BSN, PHN - PAL (patient advocate liaison)  [facility]  [phone]

## 2023-03-29 ENCOUNTER — HOSPITAL ENCOUNTER (OUTPATIENT)
Dept: WOUND CARE | Facility: CLINIC | Age: 63
Discharge: HOME | End: 2023-03-29
Attending: PHYSICIAN ASSISTANT | Admitting: PHYSICIAN ASSISTANT
Payer: MEDICARE

## 2023-03-29 VITALS — HEART RATE: 84 BPM | TEMPERATURE: 98.3 F | DIASTOLIC BLOOD PRESSURE: 74 MMHG | SYSTOLIC BLOOD PRESSURE: 129 MMHG

## 2023-03-29 DIAGNOSIS — L98.492 SKIN ULCER OF HAND WITH FAT LAYER EXPOSED (H): Primary | ICD-10-CM

## 2023-03-29 DIAGNOSIS — L98.491 SKIN ULCER OF HAND, LIMITED TO BREAKDOWN OF SKIN (H): ICD-10-CM

## 2023-03-29 DIAGNOSIS — G60.9 IDIOPATHIC PERIPHERAL NEUROPATHY: ICD-10-CM

## 2023-03-29 DIAGNOSIS — M24.549 CONTRACTURE OF HAND: ICD-10-CM

## 2023-03-29 PROCEDURE — 97602 WOUND(S) CARE NON-SELECTIVE: CPT

## 2023-03-29 PROCEDURE — 99214 OFFICE O/P EST MOD 30 MIN: CPT | Performed by: PHYSICIAN ASSISTANT

## 2023-03-29 NOTE — TELEPHONE ENCOUNTER
Spoke to patient and informed OT therapy orders signed. Patient verbalized understanding and was given an opportunity to ask questions.     Yudith GARCIA RN, BSN, PHN - PAL (patient advocate liaison)  [facility]  [phone]

## 2023-03-29 NOTE — DISCHARGE INSTRUCTIONS
Steve Morgan      1960    A DME order was not completed because supplies were not needed       CALL [phone] TO SCHEDULE R  HEUMATOLOGY VISIT  Wound Dressing Change:   Right and Left hand fingers  Cleanse with mild unscented soap and water.  Paint with betadine,  Apply [employer] hydrocolloid bandages. Purchase at [employer] or on [employer]  Change whenever the dressing falls off.    Both hands    Apply Aquaphor, CeraVe ointment or Vaseline every night.    Both legs:  Moisturize daily with high quality, hypoallergenic moisturizer.   Can use same as above if you wish.    Walk as much as you can, as you are able. Whenever you sit raise your ankle  above your hips to promote wound healing.      Paulina Abarca PA-C March 29, 2023    Call us at [phone] if you have any questions about your wounds, have redness or swelling around your wound, have a fever of 101 or greater or if you have any other problems or concerns. We answer the phone Monday through Friday 8 am to 4 pm, please leave a message as we check the voicemail frequently throughout the day.     If you had a positive experience please indicate that on your patient satisfaction survey form that [facility] will be sending you.    It was a pleasure meeting with you today.  Thank you for allowing me and my team the privilege of caring for you today.  YOU are the reason we are here, and I truly hope we provided you with the excellent service you deserve.  Please let us know if there is anything else we can do for you so that we can be sure you are leaving completely satisfied with your care experience.      If you have any billing related questions please call the [facility] Business office at [phone]. The clinic staff does not handle billing related matters.    If you are scheduled to have a follow up appointment, you will receive a reminder call the day before your visit. On the appointment day please arrive 15 minutes prior to your  appointment time. If you are unable to keep that appointment, please call the clinic to cancel or reschedule. If you are more than 10 minutes late or greater for your appointment, the clinic policy is that you may be asked to reschedule.

## 2023-03-30 ENCOUNTER — HOSPITAL ENCOUNTER (OUTPATIENT)
Dept: OCCUPATIONAL THERAPY | Facility: CLINIC | Age: 63
Setting detail: THERAPIES SERIES
Discharge: HOME | End: 2023-03-30
Attending: PHYSICIAN ASSISTANT
Payer: MEDICARE

## 2023-03-30 ENCOUNTER — HOSPITAL ENCOUNTER (OUTPATIENT)
Dept: PHYSICAL THERAPY | Facility: CLINIC | Age: 63
Setting detail: THERAPIES SERIES
Discharge: HOME | End: 2023-03-30
Attending: PHYSICIAN ASSISTANT
Payer: MEDICARE

## 2023-03-30 DIAGNOSIS — Z78.9 DEFICIT IN ACTIVITIES OF DAILY LIVING (ADL): ICD-10-CM

## 2023-03-30 DIAGNOSIS — M24.549 CONTRACTURE OF HAND: ICD-10-CM

## 2023-03-30 DIAGNOSIS — Z74.1 SELF-CARE DEFICIT FOR TOILETING: ICD-10-CM

## 2023-03-30 PROCEDURE — 97165 OT EVAL LOW COMPLEX 30 MIN: CPT | Mod: GO

## 2023-03-30 PROCEDURE — 97161 PT EVAL LOW COMPLEX 20 MIN: CPT | Mod: GP | Performed by: PHYSICAL THERAPIST

## 2023-03-30 ASSESSMENT — ACTIVITIES OF DAILY LIVING (ADL): IADL_QUICK_ADDS: MEAL PLANNING/PREPARATION;HOME/FINANCIAL/MANAGEMENT;COMMUNICATION/COMPUTER USE;COMMUNITY MOBILITY

## 2023-03-30 NOTE — PROGRESS NOTES
03/30/23 0900   Quick Adds   Quick Adds Certification   Type of Visit Initial Outpatient Occupational Therapy Evaluation   General Information   Start Of Care Date 03/30/23   Referring Physician Lisa Pierre, ANASTASIA   Orders Evaluate and treat as indicated   Other Orders PT, Hand therapy   Orders Date 03/10/23   Medical Diagnosis Encounter for long-term (current) use of medications; Deficit in activities of daily living (ADL); Self-care deficit for toileting; Contracture of hand   Onset of Illness/Injury or Date of Surgery 03/10/23  (date of order used)   Precautions/Limitations Fall precautions   Surgical/Medical History Reviewed Yes   Additional Occupational Profile Info/Pertinent History of Current Problem Steve Morgan is a pleasant 63 year old, left hand dominant male who presents to OP OT for further evaluation due to decreased safety and IND with his ADLs/IADLs. Pt has long h/o neuropathy who reports greater decline over past year.  He was hospitalized in December for an infection, had partial amputation of R long finger, then went to TCU and then had homecare.  It has been a few months since homecare was completed.  Pt states he is here to continue his therapy and get back to working on his strength and overall functional abilities. Particularly, pt reports struggling with dressing, caring for self and toileting due to neuropathy. PMH includes depression, skin ulcer on hand, bilateral contracture of hand, neuropathy, hyperliipidemia, HTN, hypothyroidism, obesity, type 2 diabetes mellitus, and sleep apnea.   Comments/Observations Pt presents to appointment alone today and ambulates with 4WW with slowed gait.   Role/Living Environment   Current Community Support Family/friend caregiver   Patient role/Employment history Retired  (Accounting)   Community/Avocational Activities Reports he spends most of his day watching TV or spending time on the computer, enjoys making spreadsheets. Reports he  dabbles with collecting coins.   Current Living Environment House  (modifed 2-story, essentially 4 levels)   Number of Stairs to Enter Home 2   Number of Stairs Within Home about 10 steps between each level   Primary Bathroom Location/Comments upper floor   Primary Bathroom Set Up/Equipment Tub;Shower stall;Shower/tub chair   Home/Community Accessibility Comments Reports bedroom/bathroom on upper floor. Laundry is one level down from the walk-in level. Reports that he overall does well on the stairs after learning to use the railing in TCU.   Prior Level - Transfers Independent   Prior Level - Ambulation Assistive equipment   Prior Level - ADLS Independent   Prior Responsibilities - IADL Meal Preparation;Housekeeping;Laundry;Shopping;Medication management;Finances;Driving   Current Assistive Devices - Mobility Walker;Standard cane  (using 4WW today, sometimes walks with just his cane including when going to grocery stores (Will push the cart for something to hold onto))   Role/Living Environment Comments Reports he lives with his spouse and adult son who works during the day.   Patient/family Goals Statement To be build his strength and be able to complete his daily activities, including dressing and toileting, to name a few   Pain   Patient currently in pain No   Fall Risk Screen   Fall screen completed by OT   Have you fallen 2 or more times in the past year? Yes   Have you fallen and had an injury in the past year? No   Is patient a fall risk? Yes;Department fall risk interventions implemented   Fall screen comments Estimates 1-2 falls in the last year. Remembers slipping on the ice on the driveway, no injuries. Has PT evaluation immediately following OT evaluation this date.   Abuse Screen (yes response referral indicated)   Feels Unsafe at Home or Work/School no   Feels Threatened by Someone no   Does Anyone Try to Keep You From Having Contact with Others or Doing Things Outside Your Home? no   Physical Signs  "of Abuse Present no   Cognitive Status Examination   Orientation Orientation to person, place and time   Level of Consciousness Alert   Follows Commands and Answers Questions 100% of the time;Able to follow single-step instructions   Personal Safety and Judgment Intact   Cognitive Comment States \"My memory is shot; I could tell you what I did as a kid but I couldn't tell you what I did 5 months ago.\" Reports he often asks his spouse the same question ove and over. Reports these changes in his memory have been more prominent in the last 6-12 months. Endorses STM impacted more than LTM. Endorses difficulty with word-finding; \"I have the thought in my head but it doesn't come out sometimes.\" Reports increased cognitive processing time, requiring extra time to respond to his wife's questions. Will plan to further assess in future session.   Visual Perception   Visual Perception Wears glasses  (progressive lenses)   Visual Perception Comments Denies changes in vision. Reports he just had his annual eye exam at the end of 2022.   Sensation   Sensation Comments Bilateral hand and LE neuropathy.   Range of Motion (ROM)   ROM Comments Reports contractures in bilateral hands in IP joints of all digits. Reports this has been ongoing for about 4-5 years. With PROM, OTR able to straighten joints WFLs. Pt has order for hand therapy with anticipation of obtaining customized hand splints. Otherwise, BUEs WFLs per  gross ROM screen.   Strength   Strength Comments Reports generalized deconditioning as he has aged but no drastic acute changes in strength.   Hand Strength   Hand Dominance Left   Left Hand  (pounds) 32 pounds  (2-3 SD below the mean)   Right Hand  (pounds) 26 pounds  (>3 SD below the mean)   Left Lateral Pinch (pounds) 5 pounds  (>3 SD below the mean)   Right Lateral Pinch (pounds) 5 pounds  (>3 SD below the mean)   Left Three Point Pinch (pounds)   (unable)   Right Three Point Pinch (pounds)   (unable)   Hand " Strength Comments Partial amputation of R long digit in December. Reports difficulty with grasp in bilateral hands.   Coordination   Upper Extremity Coordination Right UE impaired;Left UE impaired   Gross Motor Coordination Min dysmetria noted with finger to nose this date though may be due to difficulty coordinating digits secondary to contractures   Fine Motor Coordination Tremor noted bilaterally (see comment below), difficulty with FMC tasks secondary to bilateral contractures in all digits   Functional Limitations Decreased speed;Fine motor ADL performance impaired;Impaired ability to perform bilateral tasks;Object transport impaired;Reach to targets impaired   Coordination Comments Bilateral tremor that pt reports that he has had since childhood. Reports he has learned to adapt to the tremor since he has had it for so long. Does endorse tremor is worsening over time.   Balance   Balance Comments Reports feeling very unsteady if he does not have adaptive equipment for walking. Bilateral LE neuropathy makes it more challenging for him to sense his foot placement. Pt scheduled for PT assessment immediately following today's OT evaluation, will defer further evaluation to PT.   Functional Mobility   Ambulation Mod I, increased time, AE   Functional Mobility Comments Ambulates with 4WW today. Also will ambulate with SEC both at home and in the community.   Transfer Skill   Level of [place]: Transfers other (see comments)  (mod I, increased time, hand hold)   Bathing   Level of [place] - Bathing other (see comments)  (mod I, increased time, compensation)   Bathing Comments Typically sits on shower chair. Reports reaching his back and buttocks can be quite challenging. Reports difficulty with balance when getting out of the shower and drying off. Has a wall next to him outside of the shower and he often finds hiimself leaning against that for assistance.   Upper Body Dressing   Level of [place]: Dress  "Upper Body other (see comments)  (mod I, increased time, compensation)   Upper Body Dressing Comments Reports he can don/doff overhead shirts but unable to complete buttons. Has some difficulty with zippers, particularly smaller ones on his spring jacket, as well.   Lower Body Dressing   Level of [place]: Dress Lower Body other (see comments)  (mod I, increased time, compensation)   Lower Body Dressing Comments Reports cannot complete buttons or zipper on pants so typically purchases sizes a little larger so he can just slide them on and off and depends on his belt. Reports can don/doff belt without assistance. Reports he wears low-cut socks that can sometimes take a little bit of extra effort don, reports  can doff without issue. Has Velcro closure shoes and able to don/doff without significant issue.   Toileting   Level of [place]: Toilet other (see comments)  (mod I, increased time, compensation)   Toileting Comments Difficulty reaching to wipe after BM. Reports this is due to difficulty reaching his arm back (versus any difficulty with holding the toilet paper itself). Reports often uses RUE as it is easier to reach.   Grooming   Level of [place]: Grooming other (see comments)  (mod I, increased time)   Eating/Self-Feeding   Level of [place]: Eating other (see comments)  (mod I, increased time)   Eating/Self Feeding Comments Reports he hasn't been able to hold onto silverware \"properly\" for quite a while. Reports that lately his L hand feels it is not as coordinated and will tend to turn too much and make it difficult to bring food to his mouth.   Activity Tolerance   Activity Tolerance Reports he has a stationary recumbent bike that he recently purchased in first part of March. Tries to use this every day for about 15 minutes. Reports he has \"pretty good energy\" but will often get tired about mid-afternoon most days. Reports he tries to stay awake during the day but his body will often fall " "asleep if he is sitting watching TV. Reports sometimes difficulty sleeping at night. Reports gets up about 2-3x at night at the most, sometimes from the bathroom or sometimes thinks it is from falling asleep during the day or after supper.   Instrumental Activities of Daily Living Assessment   IADL Assessment/Observations Medication management: manages mod I with pillbox, typically remembers to take medications with assistance from Brianne smart device to take his AM medications. Takes insulin before bed and takes pills at this time too, this routine seems to work well for him.   IADL Quick Adds Meal Planning/Preparation;Home/Financial/Management;Communication/Computer Use;Community Mobility   Meal Planning/Preparation Reports spouse completes most of the cooking at home. Reports he does a little bit of cooking. Feels confident he can make himself simple breakfast or simple lunch like sandwiches. Reports he used to bake a little bit, can still use the utensils but is afraid that he would make a mess due to tremors in hands while trying to complete these tasks so he avoids baking as much these days.   Home/Financial Management Reports spouse does not allow him to wash knives or glass dishes to prevent injury or breaking a dish. Assists with household cleaning and vacuuming, as well as laundry. Uses laundry detergent pods for ease of completion. Reports he pays the bills at home. Reports it is becoming more challenging to remember bills. Reports several bills are on autopay and all other bills are paid online.   Communication/Computer Use Uses both cell phone and computer. Notes that holding a copmuter mouse is more challenging as R index finger \"doesn't always want to work.\" Notes greater difficulty holding onto cell phone as well so often will set on his table and hit the numbers on the phone from there.   Community Mobility Reports he is driving himself. Denies any accidents but reports occasional close calls. " Reports occasionally has difficulty recalling how to get someplace or needs to think outloud to recall where he is going. Reports it's becoming harder for him to remember where he parked the car. Reports he does the majority of the driving between him and his spouse. Reports some difficulty with gauging pressure on the gas/brake due to neuropathy in feet.   Planned Therapy Interventions   Planned Therapy Interventions ADL training;IADL training;Cognitive skills;Cognitive performance testing;Community/work reintegration;Coordination training;ROM;Self care/Home management;Strengthening;Stretching;Therapeutic activities   Adult OT Eval Goals   OT Eval Goals (Adult) 1;2;3;4;5    OT Goal 1   Goal Identifier Fatigue Management   Goal Description Pt will demonstrate 3 energy conservation strategies (e.g. pacing, planning, prioritizing, sleep hygiene, etc.) to facilitate fatigue management with ADL/IADL tasks (e.g. laundry, meal preparation, walking/exercise).   Target Date 06/22/23    OT Goal 2   Goal Identifier Hand Strength   Goal Description Pt will demonstrate improved bilateral strength by 10#  strength and 3# pinch strength (lateral and palmar) for improved ADL/IADL completion. (e.g. grasping utensils, opening containers, holding/carrying objects).   Target Date 06/22/23    OT Goal 3   Goal Identifier UE HEP   Goal Description Pt will demonstrate independent completion of 1-3 activity HEP for UE strength/coordination to promote independence with ADLs/IADLs (e.g. retrieving object from cabinet or refrigerator, microwaving food, hanging laundry).   Target Date 06/22/23   OT Goal 4   Goal Identifier Adaptive Equipment/Strategies   Goal Description Patient to verbalize understanding of and demonstrate use of 3 new pieces of adaptive equipment and/or adapted techniques to increase independence and safety with ADL/IADLs (feeding self, dressing, bathing, toileting, functional mobility, meal preparation, etc.)   Target  Date 06/22/23   OT Goal 5   Goal Identifier Memory/Safety   Goal Description Patient to verbalize understanding of  cognitive screen results and identify 3 strategies to increase patient's safety and independence in the home and community setting.   Target Date 06/22/23   Clinical Impression   Criteria for Skilled Therapeutic Interventions Met Yes, treatment indicated   OT Diagnosis Decreased safety and IND with ADLs/IADLs   Influenced by the following impairments strength, coordination, ROM, activity tolerance, balance, memory, planning/organization, attention, problem-solving/reasoning   Assessment of Occupational Performance 5 or more Performance Deficits   Identified Performance Deficits dressing, bathing, feeding, household management, meal preparation, medication management, financial management, driving, leisure/hobbies   Clinical Decision Making (Complexity) Low complexity   Therapy Frequency 1x/week   Predicted Duration of Therapy Intervention (days/wks) 12 weeks (starting with 8 weeks scheduled)   Risks and Benefits of Treatment have been explained. Yes   Patient, Family & other staff in agreement with plan of care Yes   Clinical Impression Comments Pt will benefit from skilled OT services to promote safety and IND with ADLs/IADLs   Education Assessment   Barriers To Learning Cognitive  (will benefit from written/visual handouts for carryover of education)   Preferred Learning Style Listening;Reading;Demonstration;Pictures/video   Therapy Certification   Certification date from 03/30/23   Certification date to 06/22/23   Certification I certify the need for these services furnished under this plan of treatment and while under my care.  (Physician co-signature of this document indicates review and certification of the therapy plan)   Total Evaluation Time   OT John [place] Complexity Minutes (37362) 45     Thank you for the referral of this patient.  If you have any questions regarding the information in this  report, please feel free to contact me per the information provided below.      Enedina Lim MA, OTR/L  Occupational Therapist  [address] [address]  Clinic Fax:  [phone]  Clinic Phone: [phone]

## 2023-03-30 NOTE — PROGRESS NOTES
03/30/23 1000   Quick Adds   Quick Adds Certification   Type of Visit Initial OP PT Evaluation   General Information   Start of Care Date 03/30/23   Referring Physician Quinten Pierre PA-C   Orders Evaluate and Treat as Indicated   Order Date 03/10/23   Medical Diagnosis Deficits in ADLs, self care deficit for toileting, contracture of hand.  (Pt also has h/o worsening neuropathy)   Onset of illness/injury or Date of Surgery 03/10/23  (neuropathy for more than 10 years.  Decline in function over past year)   Precautions/Limitations fall precautions   Surgical/Medical history reviewed Yes   Pertinent history of current problem (include personal factors and/or comorbidities that impact the POC) Caio is a pleasant man with long h/o neuropathy who reports greater decline over past year.  He was hospitalized in December for an infection, then went to TCU and then had homecare.  It has been a few months since homecare was completed.  Pt states he is here to continue his therapy and get back to working on his strneght and overall functional abilities.   Pertinent Visual History  corrective glasses   Prior level of function comment Was using cane for serveral years, started using walker 5-6 months ago.  Balance issues for a long time.  Does go grocery shopping wit hspouse.  Shares household tasks with wife.   Current Community Support Family/friend caregiver   Patient role/Employment history Retired  (accounting)   Living environment House/[facility]   Home/Community Accessibility Comments multilevel.  2 steps to enter.  flight of stairs in the house. with railing.   Current Assistive Devices Four Wheeled Walker;Standard Cane   ADL Devices Shower/Tub Chair   Assistive Devices Comments bath bench in shower, can take it in/out on own, take showers on own.  reaching around to wipe is difficult   Patient/Family Goals Statement Improve balance, build up fitness level.   General Information Comments .  neuropathy in hands and  feet for more than 10 years. initially tingling but quickly transitioned to complete numbness/no feeling.  right side has been strongr than L in general   numbness extends up to knees.  in hands up to wrist    was using cane, decided to get walker b/c balance wasn't feeling good.  feels like fitness has not been good for a long time, wants to build it back up.  had been having falls occasionally over the past 10 years, often could catch self but was getting harder to recover.  still driving, but sometimes can't tell how much pressure he is putting on the brake.  uses walk all the time when out of the house.  If feeling good,  will sometimes use cane to walk into a store that has carts.  in the house does not use walker most of the  time.   previously in hosptial in december for blood infection.  went to TCU, then had home PT.   Fall Risk Screen   Fall screen completed by PT   Have you fallen 2 or more times in the past year? Yes   Have you fallen and had an injury in the past year? No   Timed Up and Go score (seconds) NT   Is patient a fall risk? Department fall risk interventions implemented;Yes   Fall screen comments noticeable instabilit with standing, transfers, gait without device.   Abuse Screen (yes response referral indicated)   Feels Unsafe at Home or Work/School no   Feels Threatened by Someone no   Does Anyone Try to Keep You From Having Contact with Others or Doing Things Outside Your Home? no   Physical Signs of Abuse Present no   Pain   Patient currently in pain No   Cognitive Status Examination   Orientation orientation to person, place and time   Level of Consciousness alert   Follows Commands and Answers Questions 100% of the time;able to follow multistep instructions   Personal Safety and Judgment intact   Memory   (see OT)   Cognitive Comment see OT   Posture   Posture Forward head position;Protracted shoulders   Range of Motion (ROM)   ROM Comment decreased length bilateral hip flexors,  "hamstrings and heel cords.  contracture of hands/ fingers - see OT   Strength   Strength Comments bialteral hip flexion 4-/5,  bilateral knee flex 4-/5,  ankle DF 3-/5 bilaterally, L LE raises less than R.   Transfer Skills   Transfer Comments can rise to stand no UE support from 18\" chair with effort. decreased balance upon rising.  lower to sit with decreased control eccentrically.   Gait   Gait Comments with walker - symmetric step length, good heel contact but foot slap noted on each side.    without walker - smaller step length , wider BECK, intermittent deviation from path.   Gait Special Tests   Gait Special Tests 25 FOOT TIMED WALK   Gait Special Tests 25 Foot Timed Walk   Seconds 9.7   Steps 15 Steps   Comments with 4WW   Balance   Balance Comments static standing feet hip width apart, assist to position, then holds 12 sec once support removed.    feet closer together 3 sec then needs to touch table.  mostly shifts posteriorly, needs stepping s trategy to recover   Balance Special Tests   Balance Special Tests Romberg   Balance Special Tests Romberg   Seconds 0 Seconds   Comments unable to hold poition without assist.   Sensory Examination   Sensory Perception Comments numbness/ no feeling in feet up to knees.  numbness in hands upt ot wrists .   Coordination   Coordination Comments imparied bilateral LE.   Modality Interventions   Planned Modality Interventions Comments per PT   Planned Therapy Interventions   Planned Therapy Interventions balance training;gait training;neuromuscular re-education;strengthening;stretching;transfer training;motor coordination training;manual therapy   Clinical Impression   Criteria for Skilled Therapeutic Interventions Met yes, treatment indicated   PT Diagnosis impaired gait and balance with risk for falls.   Influenced by the following impairments decreased core/LE s trength, significant ankle weakness, impaired sensation, hand contractures, deconditionig.   Functional " limitations due to impairments risk for falls, unable to ambulate efficiently without support.  unable to stand without support.   Clinical Presentation Stable/Uncomplicated   Clinical Presentation Rationale gradual decline over many years.   Clinical Decision Making (Complexity) Low complexity   Therapy Frequency 2 times/Week   Predicted Duration of Therapy Intervention (days/wks) 90 days   Risk & Benefits of therapy have been explained Yes   Patient, Family & other staff in agreement with plan of care Yes   GOALS   PT Eval Goals 1;2;3;4   Goal 1   Goal Identifier HEP   Goal Description Caio will be independent in a home ex and activity program  to address his impairments and augment functional progress.   Target Date 06/28/23   Goal 2   Goal Identifier standing balance   Goal Description Caio will be able to stand statically without UE support x 60 sec to demonstrate improved static standing for ADLS.   Target Date 06/28/23   Goal 3   Goal Identifier Gait   Goal Description Caio will be able to ambulate distances of 30 feet with deviation less than 6 inches in either direction and without AD to demonstrate improved safety with household ambulation.   Target Date 06/28/23   Total Evaluation Time   PT Eval, Low Complexity Minutes (91013) 45   Therapy Certification   Certification date from 03/30/23   Certification date to 06/28/23   Medical Diagnosis Deficits in ADLs; neuropathy   Certification I certify the need for these services furnished under this plan of treatment and while under my care.  (Physician co-signature of this document indicates review and certification of the therapy plan).

## 2023-03-30 NOTE — PROGRESS NOTES
[facility]          OUTPATIENT OCCUPATIONAL THERAPY  EVALUATION  PLAN OF TREATMENT FOR OUTPATIENT REHABILITATION  (COMPLETE FOR INITIAL CLAIMS ONLY)  Patient's Last Name, First Name, M.I.  YOB: 1960  Steve Morgan                        Provider's Name  [facility] Medical Record No.  [id number]                               Onset Date:     03/10/23 (date of order used)   Start of Care Date:     03/30/23   Type:     ___PT   _X_OT   ___SLP Medical Diagnosis:     Encounter for long-term (current) use of medications; Deficit in activities of daily living (ADL); Self-care deficit for toileting; Contracture of hand                          OT Diagnosis:     Decreased safety and IND with ADLs/IADLs Visits from SOC:  1   _________________________________________________________________________________  Plan of Treatment/Functional Goals:  ADL training, IADL training, Cognitive skills, Cognitive performance testing, Community/work reintegration, Coordination training, ROM, Self care/Home management, Strengthening, Stretching, Therapeutic activities                    Goals  Goal Identifier: Fatigue Management  Goal Description: Pt will demonstrate 3 energy conservation strategies (e.g. pacing, planning, prioritizing, sleep hygiene, etc.) to facilitate fatigue management with ADL/IADL tasks (e.g. laundry, meal preparation, walking/exercise).  Target Date: 06/22/23     Goal Identifier: Hand Strength  Goal Description: Pt will demonstrate improved bilateral strength by 10#  strength and 3# pinch strength (lateral and palmar) for improved ADL/IADL completion. (e.g. grasping utensils, opening containers, holding/carrying objects).  Target Date: 06/22/23     Goal Identifier: UE HEP  Goal Description: Pt will demonstrate independent completion of 1-3 activity HEP for UE  strength/coordination to promote independence with ADLs/IADLs (e.g. retrieving object from cabinet or refrigerator, microwaving food, hanging laundry).  Target Date: 06/22/23     Goal Identifier: Adaptive Equipment/Strategies  Goal Description: Patient to verbalize understanding of and demonstrate use of 3 new pieces of adaptive equipment and/or adapted techniques to increase independence and safety with ADL/IADLs (feeding self, dressing, bathing, toileting, functional mobility, meal preparation, etc.)  Target Date: 06/22/23     Goal Identifier: Memory/Safety  Goal Description: Patient to verbalize understanding of  cognitive screen results and identify 3 strategies to increase patient's safety and independence in the home and community setting.  Target Date: 06/22/23                                              Therapy Frequency: 1x/week     Predicted Duration of Therapy Intervention (days/wks): 12 weeks (starting with 8 weeks scheduled)  JAI Lay          I CERTIFY THE NEED FOR THESE SERVICES FURNISHED UNDER        THIS PLAN OF TREATMENT AND WHILE UNDER MY CARE     (Physician co-signature of this document indicates review and certification of the therapy plan).               ,    Certification date from: 03/30/23, Certification date to: 06/22/23               Referring Physician: Lisa Pierre PA-C     Initial Assessment        See Epic Evaluation      Start Of Care Date: 03/30/23

## 2023-03-30 NOTE — PROGRESS NOTES
[facility]                                                                                   OUTPATIENT PHYSICAL THERAPY FUNCTIONAL EVALUATION  PLAN OF TREATMENT FOR OUTPATIENT REHABILITATION  (COMPLETE FOR INITIAL CLAIMS ONLY)  Patient's Last Name, First Name, M.I.  YOB: 1960  Steve Morgan     Provider's Name   [facility]   Medical Record No.  [id number]     Start of Care Date:  03/30/23   Onset Date:  03/10/23 (neuropathy for more than 10 years.  Decline in function over past year)   Type:     _X__PT   ____OT  ____SLP Medical Diagnosis:  Deficits in ADLs; neuropathy     PT Diagnosis:  impaired gait and balance with risk for falls. Visits from SOC:  1                              __________________________________________________________________________________  Plan of Treatment/Functional Goals:  balance training, gait training, neuromuscular re-education, strengthening, stretching, transfer training, motor coordination training, manual therapy           GOALS  DANIEL Kearney will be independent in a home ex and activity program  to address his impairments and augment functional progress.  06/28/23    standing balance  Caio will be able to stand statically without UE support x 60 sec to demonstrate improved static standing for ADLS.  06/28/23    Gait  Caio will be able to ambulate distances of 30 feet with deviation less than 6 inches in either direction and without AD to demonstrate improved safety with household ambulation.  06/28/23                                                           Therapy Frequency:  2 times/Week   Predicted Duration of Therapy Intervention:  90 days    Kinjal Nunn, PT                                    I CERTIFY THE NEED FOR THESE SERVICES FURNISHED UNDER        THIS PLAN OF TREATMENT AND WHILE UNDER MY CARE     (Physician  co-signature of this document indicates review and certification of the therapy plan).                Certification Date From:  03/30/23   Certification Date To:  06/28/23    Referring Provider:  Quinten Pierre PA-C    Initial Assessment  See Epic Evaluation- Start of Care Date: 03/30/23

## 2023-03-31 DIAGNOSIS — E87.1 HYPONATREMIA: Primary | ICD-10-CM

## 2023-03-31 NOTE — PROGRESS NOTES
[facility]    ASSESSMENT:   1. (L98.492) Skin ulcers of hand with fat layer exposed (H)  (primary encounter diagnosis)  2. Poorly controlled type 2 DM  3. Severe peripheral neuropathy of hands    PLAN/DISCUSSION:   1. Wound care plan: paint with betadine, cover and change daily, can use hydrocolloid dressings on more minor dry wounds  2. Refer to rheumatology for further workup, some concern for scleroderma  3. Continue working with PTOT for hand therapy  4. Follow-up with endocrinology for better glucose control  5. See bottom of note for detailed wound care and patient instructions    HISTORY OF PRESENT ILLNESS:   Steve Morgan is a 63 year old male with severe peripheral neuropathy with neuropathic changes of fingers who is well known to me for finger wounds. These neuropathic changes have left him susceptible to injury and poor healing. 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 He had worked with hand therapy for a while but has ceased this in the last few months, he expresses a desire to restart this. Overall he has managed his finger wounds well with hydrocolloid OTC dressings.  Blood sugars are still poorly controlled.     VITALS: /74 (BP Location: Left leg, Patient Position: Sitting)   Pulse 84   Temp 98.3  F (36.8  C) (Temporal)      PHYSICAL EXAM:  GENERAL: Patient is alert and oriented and in no acute distress  INTEGUMENTARY:   Wound Finger (Comment which one) Abrasion(s) (Active)       Wound Finger (Comment which one) Abrasion(s) (Active)       Wound (used by OP [facility] only) 07/30/21 1008 Right laceration (Active)   Thickness/Stage full thickness 08/19/22 1100   Base pink 08/19/22 1100   Periwound contracted 08/19/22 1100   Periwound Temperature warm 08/19/22 1100   Periwound Skin Turgor soft 08/19/22 1100   Edges open 08/19/22 1100   Length (cm) 1.6 08/19/22 1100   Width (cm) 2.2 08/19/22 1100   Depth (cm) 0.1 08/19/22 1100   Wound  (cm^2) 3.52 cm^2 08/19/22 1100   Wound Volume (cm^3) 0.35 cm^3 08/19/22 1100   Wound healing % -30.37 08/19/22 1100   Drainage Characteristics/Odor serosanguineous 08/19/22 1100   Drainage Amount scant 08/19/22 1100   Care, Wound non-select wound debridement performed 08/19/22 1100       Wound (used by OP [facility] only) 07/30/21 1009 Left laceration (Active)   Thickness/Stage full thickness 07/26/22 1335   Base pink 07/26/22 1335   Periwound intact 07/26/22 1335   Periwound Temperature warm 07/26/22 1335   Periwound Skin Turgor soft 07/26/22 1335   Edges open 07/26/22 1335   Length (cm) 0.8 07/26/22 1335   Width (cm) 0.3 07/26/22 1335   Depth (cm) 0.1 07/26/22 1335   Wound (cm^2) 0.24 cm^2 07/26/22 1335   Wound Volume (cm^3) 0.02 cm^3 07/26/22 1335   Wound healing % -50 07/26/22 1335   Drainage Characteristics/Odor serosanguineous 07/26/22 1335   Drainage Amount moderate 07/26/22 1335   Care, Wound non-select wound debridement performed 07/26/22 1335       Wound (used by OP [facility] only) 07/30/21 1011 Left laceration (Active)   Thickness/Stage full thickness 03/29/23 1302   Base dry;scab 03/29/23 1302   Periwound intact 07/26/22 1335   Periwound Temperature warm 07/26/22 1335   Periwound Skin Turgor soft 07/26/22 1335   Edges open 07/26/22 1335   Length (cm) 1 03/29/23 1302   Width (cm) 0.3 03/29/23 1302   Depth (cm) 0 03/29/23 1302   Wound (cm^2) 0.3 cm^2 03/29/23 1302   Wound Volume (cm^3) 0 cm^3 03/29/23 1302   Wound healing % 82.86 03/29/23 1302   Drainage Characteristics/Odor serosanguineous 03/29/23 1302   Drainage Amount moderate 03/29/23 1302   Care, Wound non-select wound debridement performed 03/29/23 1302       Wound (used by OP [facility] only) 07/26/22 1337 Right 2 finger (Active)   Thickness/Stage full thickness 03/29/23 1302   Base pink 03/29/23 1302   Periwound intact 03/29/23 1302   Periwound Temperature warm 03/29/23 1302   Periwound Skin Turgor soft 03/29/23 1302   Edges open 03/29/23 1302   Length (cm) 0.5  03/29/23 1302   Width (cm) 1 03/29/23 1302   Depth (cm) 0.1 03/29/23 1302   Wound (cm^2) 0.5 cm^2 03/29/23 1302   Wound Volume (cm^3) 0.05 cm^3 03/29/23 1302   Wound healing % 54.55 03/29/23 1302   Drainage Characteristics/Odor serosanguineous 03/29/23 1302   Drainage Amount moderate 03/29/23 1302   Care, Wound non-select wound debridement performed 03/29/23 1302       Wound (used by OP [facility] only) 03/29/23 1307 Left 2 finger (Active)   Thickness/Stage full thickness 03/29/23 1302   Length (cm) 0.3 03/29/23 1302   Width (cm) 0.4 03/29/23 1302   Depth (cm) 0.21 03/29/23 1302   Wound (cm^2) 0.12 cm^2 03/29/23 1302   Wound Volume (cm^3) 0.03 cm^3 03/29/23 1302   Drainage Characteristics/Odor serosanguineous 03/29/23 1302   Drainage Amount moderate 03/29/23 1302   Care, Wound non-select wound debridement performed 03/29/23 1302       Incision/Surgical Site 05/16/13 Bilateral Foot (Active)       Incision/Surgical Site 12/23/22 Right Hand (Active)             MDM: 39 minutes were spent on the date of the visit reviewing previous chart notes, evaluating patient and developing the treatment plan, this excludes any time spent on procedures.     PATIENT INSTRUCTIONS      Further instructions from your care team       Steve Morgan      1960    A DME order was not completed because supplies were not needed       CALL [phone] TO SCHEDULE R  HEUMATOLOGY VISIT  Wound Dressing Change:   Right and Left hand fingers  Cleanse with mild unscented soap and water.  Paint with betadine,  Apply [employer] hydrocolloid bandages. Purchase at [employer] or on [employer]  Change whenever the dressing falls off.    Both hands    Apply Aquaphor, CeraVe ointment or Vaseline every night.    Both legs:  Moisturize daily with high quality, hypoallergenic moisturizer.   Can use same as above if you wish.    Walk as much as you can, as you are able. Whenever you sit raise your ankle  above your hips to promote wound healing.      Paulina  ANASTASIA Abarca March 29, 2023    Call us at [phone] if you have any questions about your wounds, have redness or swelling around your wound, have a fever of 101 or greater or if you have any other problems or concerns. We answer the phone Monday through Friday 8 am to 4 pm, please leave a message as we check the voicemail frequently throughout the day.     If you had a positive experience please indicate that on your patient satisfaction survey form that [facility] will be sending you.    It was a pleasure meeting with you today.  Thank you for allowing me and my team the privilege of caring for you today.  YOU are the reason we are here, and I truly hope we provided you with the excellent service you deserve.  Please let us know if there is anything else we can do for you so that we can be sure you are leaving completely satisfied with your care experience.      If you have any billing related questions please call the [facility] Business office at [phone]. The clinic staff does not handle billing related matters.    If you are scheduled to have a follow up appointment, you will receive a reminder call the day before your visit. On the appointment day please arrive 15 minutes prior to your appointment time. If you are unable to keep that appointment, please call the clinic to cancel or reschedule. If you are more than 10 minutes late or greater for your appointment, the clinic policy is that you may be asked to reschedule.            Electronically signed by Paulina Abarca PA-C on March 31, 2023

## 2023-04-04 ENCOUNTER — HOSPITAL ENCOUNTER (OUTPATIENT)
Dept: PHYSICAL THERAPY | Facility: CLINIC | Age: 63
Setting detail: THERAPIES SERIES
Discharge: HOME | End: 2023-04-04
Attending: PHYSICIAN ASSISTANT
Payer: MEDICARE

## 2023-04-04 PROCEDURE — 97750 PHYSICAL PERFORMANCE TEST: CPT | Mod: GP

## 2023-04-04 PROCEDURE — 97110 THERAPEUTIC EXERCISES: CPT | Mod: GP

## 2023-04-04 NOTE — PROGRESS NOTES
04/04/23 0900   Signing Clinician's Name / Credentials   Signing clinician's name / credentials Della Butler, PT, DPT   Palomo Balance Scale (PALOMO K, VIJAYA S, JENNIFER GRISSOM, ELY B: MEASURING BALANCE IN THE ELDERLY: VALIDATION OF AN INSTRUMENT. CAN. J. PUB. HEALTH, JULY/AUGUST SUPPLEMENT 2:S7-11, 1992.)   Sit To Stand 3   Standing Unsupported 2   Sitting Unsupported 4   Stand to Sit 3   Transfers 3   Standing with Eyes Closed 0   Standing Unsupported, Feet Together 0   Reach Forward With Outstretched Arm 0   Retrieve Object From Floor 3   Turning to Look Behind 0   Turn 360 Degrees 1   Placing Alternate Foot on Stool (4-6 inches) 0   Unsupported Tandem Stand (Demonstrate to Subject) 0   One Leg Stand 0   Total Score (A score of 45 or less has been correlated with an increased risk of falls)   Total Score (out of 56) 19

## 2023-04-07 ENCOUNTER — TELEPHONE (OUTPATIENT)
Dept: FAMILY MEDICINE | Facility: CLINIC | Age: 63
End: 2023-04-07
Payer: MEDICARE

## 2023-04-07 NOTE — TELEPHONE ENCOUNTER
Caio had contacted the Prescription Assistance Department for help with the cost of his medications.    Unfortunately based on the income information he gave us, he is over income for  assistance programs this year.    We will send him a reminder letter in November 2023 to review for 2024 programs.    Luann Rosen  Prescription Assistance Supervisor  Pharmacy Assistance  [phone]

## 2023-04-11 ENCOUNTER — HOSPITAL ENCOUNTER (OUTPATIENT)
Dept: PHYSICAL THERAPY | Facility: CLINIC | Age: 63
Setting detail: THERAPIES SERIES
Discharge: HOME | End: 2023-04-11
Attending: PHYSICIAN ASSISTANT
Payer: MEDICARE

## 2023-04-11 PROCEDURE — 97112 NEUROMUSCULAR REEDUCATION: CPT | Mod: GP | Performed by: PHYSICAL THERAPIST

## 2023-04-11 PROCEDURE — 97110 THERAPEUTIC EXERCISES: CPT | Mod: GP | Performed by: PHYSICAL THERAPIST

## 2023-04-12 ENCOUNTER — HOSPITAL ENCOUNTER (OUTPATIENT)
Dept: OCCUPATIONAL THERAPY | Facility: CLINIC | Age: 63
Setting detail: THERAPIES SERIES
Discharge: HOME | End: 2023-04-12
Attending: PHYSICIAN ASSISTANT
Payer: MEDICARE

## 2023-04-12 PROCEDURE — 97110 THERAPEUTIC EXERCISES: CPT | Mod: GO

## 2023-04-13 ENCOUNTER — HOSPITAL ENCOUNTER (OUTPATIENT)
Dept: PHYSICAL THERAPY | Facility: CLINIC | Age: 63
Setting detail: THERAPIES SERIES
Discharge: HOME | End: 2023-04-13
Attending: PHYSICIAN ASSISTANT
Payer: MEDICARE

## 2023-04-13 PROCEDURE — 97112 NEUROMUSCULAR REEDUCATION: CPT | Mod: GP | Performed by: PHYSICAL THERAPIST

## 2023-04-13 PROCEDURE — 97110 THERAPEUTIC EXERCISES: CPT | Mod: GP | Performed by: PHYSICAL THERAPIST

## 2023-04-17 ENCOUNTER — PATIENT OUTREACH (OUTPATIENT)
Dept: FAMILY MEDICINE | Facility: CLINIC | Age: 63
End: 2023-04-17

## 2023-04-17 NOTE — TELEPHONE ENCOUNTER
Patient calls to request alternative medication for buproprion. He is currently paying $150     Patient reports he looked at insurance formulary and reports that if prescribed as 75 MG instead of 150 MG patient feels it would be less expensive.     RN asked patient if he contacted insurance for alternatives. Patient reports he did and would like Courtneys recommendation on the alternative medications. Patient does not have list on hand at this time. Patient requests to send a [employer] message with the alternative medications for Lisa to review and advise. RN will monitor for [employer] message.     Yudith GARCIA RN, BSN, PHN - PAL (patient advocate liaison)  [facility]  [phone]

## 2023-04-18 ENCOUNTER — MYC MEDICAL ADVICE (OUTPATIENT)
Dept: FAMILY MEDICINE | Facility: CLINIC | Age: 63
End: 2023-04-18
Payer: MEDICARE

## 2023-04-18 DIAGNOSIS — F33.1 MODERATE RECURRENT MAJOR DEPRESSION (H): ICD-10-CM

## 2023-04-18 DIAGNOSIS — E87.1 HYPONATREMIA: Primary | ICD-10-CM

## 2023-04-18 DIAGNOSIS — Z86.39 HISTORY OF SIADH: ICD-10-CM

## 2023-04-18 DIAGNOSIS — F33.0 MILD EPISODE OF RECURRENT MAJOR DEPRESSIVE DISORDER (H): ICD-10-CM

## 2023-04-18 RX ORDER — BUPROPION HYDROCHLORIDE 75 MG/1
150 TABLET ORAL 2 TIMES DAILY
Qty: 360 TABLET | Refills: 1 | Status: SHIPPED | OUTPATIENT
Start: 2023-04-18 | End: 2023-06-02

## 2023-04-18 NOTE — TELEPHONE ENCOUNTER
Patient sent [employer] message regarding same concern, see next mychart encounter.     Yudith GARCIA RN, BSN, PHN - PAL (patient advocate liaison)  [facility]  [phone]

## 2023-04-24 ENCOUNTER — LAB (OUTPATIENT)
Dept: LAB | Facility: CLINIC | Age: 63
End: 2023-04-24
Payer: MEDICARE

## 2023-04-24 DIAGNOSIS — E87.1 HYPONATREMIA: ICD-10-CM

## 2023-04-24 LAB
ANION GAP SERPL CALCULATED.3IONS-SCNC: 11 MMOL/L (ref 7–15)
BUN SERPL-MCNC: 25.5 MG/DL (ref 8–23)
CALCIUM SERPL-MCNC: 9.6 MG/DL (ref 8.8–10.2)
CHLORIDE SERPL-SCNC: 103 MMOL/L (ref 98–107)
CREAT SERPL-MCNC: 1.57 MG/DL (ref 0.67–1.17)
DEPRECATED HCO3 PLAS-SCNC: 20 MMOL/L (ref 22–29)
GFR SERPL CREATININE-BSD FRML MDRD: 49 ML/MIN/1.73M2
GLUCOSE SERPL-MCNC: 218 MG/DL (ref 70–99)
POTASSIUM SERPL-SCNC: 5.4 MMOL/L (ref 3.4–5.3)
SODIUM SERPL-SCNC: 134 MMOL/L (ref 136–145)

## 2023-04-24 PROCEDURE — 80048 BASIC METABOLIC PNL TOTAL CA: CPT

## 2023-04-24 PROCEDURE — 36415 COLL VENOUS BLD VENIPUNCTURE: CPT

## 2023-04-25 ENCOUNTER — PATIENT OUTREACH (OUTPATIENT)
Dept: FAMILY MEDICINE | Facility: CLINIC | Age: 63
End: 2023-04-25
Payer: MEDICARE

## 2023-04-25 ENCOUNTER — HOSPITAL ENCOUNTER (OUTPATIENT)
Dept: OCCUPATIONAL THERAPY | Facility: CLINIC | Age: 63
Setting detail: THERAPIES SERIES
Discharge: HOME | End: 2023-04-25
Attending: PHYSICIAN ASSISTANT
Payer: MEDICARE

## 2023-04-25 ENCOUNTER — TELEPHONE (OUTPATIENT)
Dept: RHEUMATOLOGY | Facility: CLINIC | Age: 63
End: 2023-04-25
Payer: MEDICARE

## 2023-04-25 ENCOUNTER — HOSPITAL ENCOUNTER (OUTPATIENT)
Dept: PHYSICAL THERAPY | Facility: CLINIC | Age: 63
Setting detail: THERAPIES SERIES
Discharge: HOME | End: 2023-04-25
Attending: PHYSICIAN ASSISTANT
Payer: MEDICARE

## 2023-04-25 DIAGNOSIS — F33.0 MILD EPISODE OF RECURRENT MAJOR DEPRESSIVE DISORDER (H): Primary | ICD-10-CM

## 2023-04-25 PROCEDURE — 97112 NEUROMUSCULAR REEDUCATION: CPT | Mod: GP | Performed by: PHYSICAL THERAPIST

## 2023-04-25 PROCEDURE — 97110 THERAPEUTIC EXERCISES: CPT | Mod: GO

## 2023-04-25 PROCEDURE — 97110 THERAPEUTIC EXERCISES: CPT | Mod: GP | Performed by: PHYSICAL THERAPIST

## 2023-04-25 NOTE — TELEPHONE ENCOUNTER
Placed call to patient to complete scleroderma clinic intake assessment. Left message requesting a call back from patient.  Gilda Butler RN  [facility]

## 2023-04-25 NOTE — TELEPHONE ENCOUNTER
Lisa Pierre PA-C-    Please advise if second referral needed.     Patient calls and requests referral to therapist, reports he was contacted by [facility] to schedule an appointment regarding medications.   RN informed referral sent for psychiatric medication eval and stabilization. Explained patient can discuss current symptoms with psychiatrist and changes to medication can be made.   -Patient agreeable to seeing psychiatrist   -Patient requests referral to therapist in addition to psychiatrist.     Yudith GARCIA RN, BSN, PHN - PAL (patient advocate liaison)  [facility]  [phone]

## 2023-04-25 NOTE — TELEPHONE ENCOUNTER
[facility] will call you to coordinate your care as prescribed by your provider. If you don't hear from a representative within 2 business days, please call [phone].    Referral for therapy put in as well.    He can call the same number to set up Psychiatry and therapy appointments.     Lisa Pierre PA-C

## 2023-04-26 ENCOUNTER — TELEPHONE (OUTPATIENT)
Dept: RHEUMATOLOGY | Facility: CLINIC | Age: 63
End: 2023-04-26

## 2023-04-26 NOTE — TELEPHONE ENCOUNTER
Spoke to patient to inform therapy referral sent. Patient verbalized understanding, feels he has number for scheduling on phone. RN informed would send scheduling number via Bring[employer] as well.     Yudith GARCIA RN, BSN, PHN - PAL (patient advocate liaison)  [facility]  [phone]

## 2023-04-26 NOTE — TELEPHONE ENCOUNTER
Message left to return call to this RN PAL. Please transfer if available.    Direct number left for this RN PAL on message for return call.     Yudith GARCIA RN, BSN, PHN, PAL (patient advocate liaison)   [employer]  [phone]

## 2023-04-26 NOTE — TELEPHONE ENCOUNTER
Scleroderma Patient Intake Form    Referring Provider: SLIM Carmichael    Primary Care Provider: Lisa Pierre PA-C   Location: [place] [facility]    A. Have you been told you have Scleroderma? No    LABS:  MADDISON Negative 2005   Centromere AB Not tested   SCL 70 Not tested   RNA Poly III Not tested     B. When were you diagnosed (month/year)? NA  1. Date when Raynaud's began (month/year)? NA  2. Date when skin swelling began (month/year) Don't remember    C. Doctor who diagnosed you: NA     D. Have you seen a Rheumatologist in the past? No       E. Who currently manages your care for this issue now? PCP/Wound clinic    F. What primary concern do you want addressed by Dr. Martinez? Are my sores caused by scleroderma?       Do you currently have or have you had in the past the following symptoms:    1. Hands cold or purple with cold weather: YES    2.  Puffy fingers: YES    3.  Non-healing wounds on fingertips or knuckles: YES Start as blisters or look like papercuts     4.  Current wounds to fingers: YES, L second finger, R second finger - amputation of tip Jan 23     5.  Skin thickening: (when you pinch your skin does it make a tent) Unsure  A. Skin thickening of fingers: Unsure  B. Skin thickening from the wrist to the elbow: No  C. Skin thickening of the face or feet: No  D. Skin thickening of upper arms, trunk, thighs: No    6. Joint swelling and/or stiffness: YES   A. Which joints: Hands    7. Muscle pain/ weakness: YES   A. Which muscles: Hands and feet    B. Describe the pain: Neuropathic pain    8. Rash: No       9.  Diarrhea: YES, related to specific foods, occurs occastionally   A. Previous diagnosis of SBO (Small Bowel Bacterial Overgrowth, SIBO)? No  B. Antibiotics for diarrhea: No     10: Unintentional weight loss: No     11. Bowel obstruction requiring hospitalization : No     12. Nutrients/vitamins through an IV: No    13. Heartburn or food/stomach acid comes up: YES Occasional heartburn. Has reflux  "2-3x week that triggers feelings of nausea   A. Medications taken for this: pantoprazole PRN   B. Have a diagnosis of Gave (Gastric Antral Vascular Ectasia)? No    14. Food or pills get stuck when you swallow: YES, \"sometimes pills get caught in my throat\" did not identify specific frequency   A. Have diagnosis of esophageal dysmotility? No    15. Chronic cough: Yes, dry cough has lasted all winter and is gradually improving. Occurs most often in the AM.    A. Have you been told you have ILD or pulmonary fibrosis? No    16. Inflamed lining around lung (pleurisy): No    17. Shortness of breath needing home oxygen: No     18. Shortness of breath or fatigue with normal activity: YES, reports gag reflex is triggered when he gets \"winded\"     19. Shortness of breath at rest: No    20. Known Heart problems: No     21. Inflamed lining around heart (pericarditis): No    22. Abnormal heart rhythms (arrhythmias): No    23. Other heart problems that were not mentioned: No   A. PAH/PH: No   B. Diastolic dysfunction: No    24. High blood pressure: YES  A. High blood pressure before scleroderma: No, wounds started developing after he was diagnosed with HBP    24. History of dialysis: No    25. History of blood clots: No    26. History of miscarriages when more than 2 months pregnant: not applicable     27.  Have you ever been diagnosed with any other autoimmune diseases? (i.e.rheumatoid arthritis, lupus, sjogren's): No     28.  Loss of balance: Yes, likely related to neuropathy in feet.      29.  Migraine headaches: No    30. Seizures or strokes: No    31. Nerve problem's in face (Bell's Palsy): No    32. Numbness of hands, or been told you have carpal tunnel syndrome: No   A. If so when did you start to develop the numbness? Numbness in fingers developed 6-7 years ago     33. Dry eyes: YES, spring allergies  A. Need to use artificial tears: No  B. Gritty or fannie feeling in eyes: No  C. Dry mouth: No      34. Do you smoke? " No    35. How much alcohol do you drink per week? rare    36. Do you use any stimulant drugs (i.e. Pseudoephedrine) No    37. Recreational drugs (i.e. Crystal meth, cocaine) No    38. Is there any family history or diagnosis of autoimmune disease? No    39. Have you been in the hospital due to scleroderma? No    Have you ever had any of the following tests? Where and when?    1. Upper endoscopy:  YES   Date/Where: 10/21/21 [facility] - dx with hiatal hernia    2. Chest x-ray: YES  Date/Where: 1/7/23 [facility]    3. High resolution CT scan: No    4. Pulmonary function test:  No    5. EKG: YES, 1/7/23 [facility]    6. Right heart Cath: No    7. ECHO cardiogram:  No    8. Barium swallow: No    Have you ever seen the following specialists: Where and when?    Pulmonologist: No    Gastroenterologist: YES   Name/Location: [facility]     Cardiologist: No     Dermatologist: No    6. Are there any other symptoms or concerns I haven't mentioned that you would like to discuss with the doctor? No      After completing intake, informed patient that information will be reviewed with Dr. Martinez and he will be contacted with information about scheduling, including possible testing to be completed prior to appointment. Patient verbalized understanding and agrees to plan.     Gilda Butler RN  [facility]

## 2023-04-28 ENCOUNTER — HOSPITAL ENCOUNTER (OUTPATIENT)
Dept: WOUND CARE | Facility: CLINIC | Age: 63
Discharge: HOME | End: 2023-04-28
Attending: PHYSICIAN ASSISTANT | Admitting: PHYSICIAN ASSISTANT
Payer: MEDICARE

## 2023-04-28 ENCOUNTER — HOSPITAL ENCOUNTER (OUTPATIENT)
Dept: PHYSICAL THERAPY | Facility: CLINIC | Age: 63
Setting detail: THERAPIES SERIES
Discharge: HOME | End: 2023-04-28
Attending: PHYSICIAN ASSISTANT
Payer: MEDICARE

## 2023-04-28 VITALS
DIASTOLIC BLOOD PRESSURE: 85 MMHG | SYSTOLIC BLOOD PRESSURE: 156 MMHG | TEMPERATURE: 98.5 F | RESPIRATION RATE: 20 BRPM | HEART RATE: 69 BPM

## 2023-04-28 DIAGNOSIS — L98.492 SKIN ULCER OF HAND WITH FAT LAYER EXPOSED (H): Primary | ICD-10-CM

## 2023-04-28 PROCEDURE — 97116 GAIT TRAINING THERAPY: CPT | Mod: GP | Performed by: PHYSICAL THERAPIST

## 2023-04-28 PROCEDURE — 99213 OFFICE O/P EST LOW 20 MIN: CPT | Performed by: PHYSICIAN ASSISTANT

## 2023-04-28 PROCEDURE — 97112 NEUROMUSCULAR REEDUCATION: CPT | Mod: GP | Performed by: PHYSICAL THERAPIST

## 2023-04-28 PROCEDURE — G0463 HOSPITAL OUTPT CLINIC VISIT: HCPCS

## 2023-04-28 NOTE — PROGRESS NOTES
Patient arrived for wound care visit. Certified Wound Care Nurse time spent evaluating patient record, completed a full evaluation and documented wound(s) & qasim-wound skin; provided recommendation based on treatment plan. Applied dressing, reviewed discharge instructions, patient education, and discussed plan of care with appropriate medical team staff members and patient and/or family members.     Wound is healed and education provided on prevention and future care.

## 2023-04-28 NOTE — DISCHARGE INSTRUCTIONS
Steve Morgan      1960  A DME order was not completed because supplies were not needed    Wound Dressing Change: Left Hand  - Wash your hands with soap and water before you begin your dressing change and prepare a clean surface for dressings.  Wash with gentle soap and water, pat dry  Moisturize your hands with Vaseline or AquaPhor  When wounds open cover with the Hydrocolloid dressing and change as needed    Watch for signs of infection: increased redness, pain, swelling  Continue with Hand Therapy  Call [facility] if wounds get out of hand!     Paulina Abarca PA-C April 28, 2023    Call us at [phone] if you have any questions about your wounds, have redness or swelling around your wound, have a fever of 101 or greater or if you have any other problems or concerns. We answer the phone Monday through Friday 8 am to 4 pm, please leave a message as we check the voicemail frequently throughout the day.     If you had a positive experience please indicate that on your patient satisfaction survey form that [facility] will be sending you.    It was a pleasure meeting with you today.  Thank you for allowing me and my team the privilege of caring for you today.  YOU are the reason we are here, and I truly hope we provided you with the excellent service you deserve.  Please let us know if there is anything else we can do for you so that we can be sure you are leaving completely satisfied with your care experience.      If you have any billing related questions please call the [facility] Business office at [phone]. The clinic staff does not handle billing related matters.    If you are scheduled to have a follow up appointment, you will receive a reminder call the day before your visit. On the appointment day please arrive 15 minutes prior to your appointment time. If you are unable to keep that appointment, please call the clinic to cancel or reschedule. If you are more than 10 minutes late or greater for  your appointment, the clinic policy is that you may be asked to reschedule.

## 2023-04-28 NOTE — PROGRESS NOTES
[facility]    ASSESSMENT:   1. (L98.492) Skin ulcers of hand with fat layer exposed (H)  (primary encounter diagnosis)  2. Poorly controlled type 2 DM  3. Severe peripheral neuropathy of hands    PLAN/DISCUSSION:   1. Wound care plan: paint with betadine, cover and change daily, can use hydrocolloid dressings on more minor dry wounds  2. Has been referred to rheumatology for further workup, r/o scleroderma  3. Continue working with PTOT for hand therapy  4. Follow-up with endocrinology for better glucose control  5. See bottom of note for detailed wound care and patient instructions    HISTORY OF PRESENT ILLNESS:   Steve Morgan is a 63 year old male with severe peripheral neuropathy with neuropathic changes of fingers who is well known to me for finger wounds. These neuropathic changes have left him susceptible to injury and poor healing. 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 He had worked with hand therapy for a while but has ceased this in the last few months, he expressed a desire to restart this at our last visit and he has now scheduled these appointments.     Today the previous wounds that we had observed last visit have resolved. He has a new spot on his left third finger. States it started as a blister. Has not been dressing it.     VITALS: BP (!) 156/85 (BP Location: Right arm, Patient Position: Sitting, Cuff Size: Adult Large)   Pulse 69   Temp 98.5  F (36.9  C) (Temporal)   Resp 20      PHYSICAL EXAM:  GENERAL: Patient is alert and oriented and in no acute distress  INTEGUMENTARY:   Wound Finger (Comment which one) Abrasion(s) (Active)       Wound Finger (Comment which one) Abrasion(s) (Active)       Wound (used by OP [facility] only) 07/30/21 1008 Right laceration (Active)   Thickness/Stage full thickness 08/19/22 1100   Base pink 08/19/22 1100   Periwound contracted 08/19/22 1100   Periwound Temperature warm 08/19/22 1100   Periwound Skin  Turgor soft 08/19/22 1100   Edges open 08/19/22 1100   Length (cm) 1.6 08/19/22 1100   Width (cm) 2.2 08/19/22 1100   Depth (cm) 0.1 08/19/22 1100   Wound (cm^2) 3.52 cm^2 08/19/22 1100   Wound Volume (cm^3) 0.35 cm^3 08/19/22 1100   Wound healing % -30.37 08/19/22 1100   Drainage Characteristics/Odor serosanguineous 08/19/22 1100   Drainage Amount scant 08/19/22 1100   Care, Wound non-select wound debridement performed 08/19/22 1100       Wound (used by OP [facility] only) 07/30/21 1009 Left laceration (Active)   Thickness/Stage full thickness 04/28/23 1249   Base red;scab;dry 04/28/23 1249   Periwound intact 04/28/23 1249   Periwound Temperature warm 04/28/23 1249   Periwound Skin Turgor soft 04/28/23 1249   Edges rolled/closed 04/28/23 1249   Length (cm) 0.4 04/28/23 1249   Width (cm) 0.3 04/28/23 1249   Depth (cm) 0.1 04/28/23 1249   Wound (cm^2) 0.12 cm^2 04/28/23 1249   Wound Volume (cm^3) 0.01 cm^3 04/28/23 1249   Wound healing % 25 04/28/23 1249   Drainage Characteristics/Odor serosanguineous 07/26/22 1335   Drainage Amount none 04/28/23 1249   Care, Wound non-select wound debridement performed 04/28/23 1249       Wound (used by OP [facility] only) 07/30/21 1011 Left laceration (Active)   Thickness/Stage full thickness 03/29/23 1302   Base dry;scab 03/29/23 1302   Periwound intact 07/26/22 1335   Periwound Temperature warm 07/26/22 1335   Periwound Skin Turgor soft 07/26/22 1335   Edges open 07/26/22 1335   Length (cm) 1 03/29/23 1302   Width (cm) 0.3 03/29/23 1302   Depth (cm) 0 03/29/23 1302   Wound (cm^2) 0.3 cm^2 03/29/23 1302   Wound Volume (cm^3) 0 cm^3 03/29/23 1302   Wound healing % 82.86 03/29/23 1302   Drainage Characteristics/Odor serosanguineous 03/29/23 1302   Drainage Amount moderate 03/29/23 1302   Care, Wound non-select wound debridement performed 03/29/23 1302       Wound (used by OP [facility] only) 07/26/22 1337 Right 2 finger (Active)   Thickness/Stage full thickness 03/29/23 1302   Base pink 03/29/23 1302    Periwound intact 03/29/23 1302   Periwound Temperature warm 03/29/23 1302   Periwound Skin Turgor soft 03/29/23 1302   Edges open 03/29/23 1302   Length (cm) 0.5 03/29/23 1302   Width (cm) 1 03/29/23 1302   Depth (cm) 0.1 03/29/23 1302   Wound (cm^2) 0.5 cm^2 03/29/23 1302   Wound Volume (cm^3) 0.05 cm^3 03/29/23 1302   Wound healing % 54.55 03/29/23 1302   Drainage Characteristics/Odor serosanguineous 03/29/23 1302   Drainage Amount moderate 03/29/23 1302   Care, Wound non-select wound debridement performed 03/29/23 1302       Wound (used by OP [facility] only) 03/29/23 1307 Left 2 finger (Active)   Thickness/Stage full thickness 03/29/23 1302   Length (cm) 0.3 03/29/23 1302   Width (cm) 0.4 03/29/23 1302   Depth (cm) 0.21 03/29/23 1302   Wound (cm^2) 0.12 cm^2 03/29/23 1302   Wound Volume (cm^3) 0.03 cm^3 03/29/23 1302   Drainage Characteristics/Odor serosanguineous 03/29/23 1302   Drainage Amount moderate 03/29/23 1302   Care, Wound non-select wound debridement performed 03/29/23 1302       Incision/Surgical Site 05/16/13 Bilateral Foot (Active)       Incision/Surgical Site 12/23/22 Right Hand (Active)                MDM: 20 minutes were spent on the date of the visit reviewing previous chart notes, evaluating patient and developing the treatment plan, this excludes any time spent on procedures.     PATIENT INSTRUCTIONS      Further instructions from your care team       Steve Morgan      1960  A DME order was not completed because supplies were not needed    Wound Dressing Change: Left Hand  - Wash your hands with soap and water before you begin your dressing change and prepare a clean surface for dressings.  Wash with gentle soap and water, pat dry  Moisturize your hands with Vaseline or AquaPhor  When wounds open cover with the Hydrocolloid dressing and change as needed    Watch for signs of infection: increased redness, pain, swelling  Continue with Hand Therapy  Call [facility] if wounds get out of hand!      Paulina Abarca PA-C April 28, 2023    Call us at [phone] if you have any questions about your wounds, have redness or swelling around your wound, have a fever of 101 or greater or if you have any other problems or concerns. We answer the phone Monday through Friday 8 am to 4 pm, please leave a message as we check the voicemail frequently throughout the day.     If you had a positive experience please indicate that on your patient satisfaction survey form that [facility] will be sending you.    It was a pleasure meeting with you today.  Thank you for allowing me and my team the privilege of caring for you today.  YOU are the reason we are here, and I truly hope we provided you with the excellent service you deserve.  Please let us know if there is anything else we can do for you so that we can be sure you are leaving completely satisfied with your care experience.      If you have any billing related questions please call the [facility] Business office at [phone]. The clinic staff does not handle billing related matters.    If you are scheduled to have a follow up appointment, you will receive a reminder call the day before your visit. On the appointment day please arrive 15 minutes prior to your appointment time. If you are unable to keep that appointment, please call the clinic to cancel or reschedule. If you are more than 10 minutes late or greater for your appointment, the clinic policy is that you may be asked to reschedule.

## 2023-05-01 ENCOUNTER — THERAPY VISIT (OUTPATIENT)
Dept: OCCUPATIONAL THERAPY | Facility: CLINIC | Age: 63
End: 2023-05-01
Payer: MEDICARE

## 2023-05-01 ENCOUNTER — TELEPHONE (OUTPATIENT)
Dept: FAMILY MEDICINE | Facility: CLINIC | Age: 63
End: 2023-05-01

## 2023-05-01 DIAGNOSIS — M24.549 CONTRACTURE OF HAND JOINT, UNSPECIFIED LATERALITY: Primary | ICD-10-CM

## 2023-05-01 DIAGNOSIS — G63 POLYNEUROPATHY ASSOCIATED WITH UNDERLYING DISEASE (H): Primary | ICD-10-CM

## 2023-05-01 PROCEDURE — 97110 THERAPEUTIC EXERCISES: CPT | Mod: GO

## 2023-05-01 PROCEDURE — 97166 OT EVAL MOD COMPLEX 45 MIN: CPT | Mod: GO

## 2023-05-01 PROCEDURE — 97535 SELF CARE MNGMENT TRAINING: CPT | Mod: GO

## 2023-05-01 NOTE — TELEPHONE ENCOUNTER
Kinjal Nunn, PT  Lisa Pierre, ANASTASIA Gonzalez,     In physical therapy today I had Caio try a few different off the shelf AFOs and he felt that they helped his balance both in standing statically and in walking.  Would you be willing to write and order for Orthotics evaluation and fabricate/issue appropriate AFOs for bilateral peripheral neuropathy?     Thanks for your time and consideration,     Kinjal Nunn, PT, C/NDT

## 2023-05-01 NOTE — PROGRESS NOTES
BELLE Hand Therapy Initial Evaluation     Current Date: 5/1/2023    Diagnosis: Nikko hand contractures   DOI: symptoms for 8-10 years  Referring Provider: Lisa Pierre PA-C    Subjective:  Pt reports overall health as good. He notes PMH including depression, DMII w/ neuropathy, HTN, thyroid problems and foot drop. 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 Pt reports no medical allergies. Pt is retired, and previously worked as an .       Occupational Profile Information:  Left hand dominant  Prior functional level:  independent-shared household chores  Patient reports symptoms of pain, stiffness/loss of motion, weakness/loss of strength, edema and numbness  Special tests:  x-ray.    Barriers include:none  Mobility: No difficulty  Transportation: drives  Currently not working due to present treatment problem  Leisure activities/hobbies: coin collecting, watching shows   Other:   Had to quit working, difficult to write, [employer] - buttons, uses velcro for shoes, difficulty gripping, needs lever for door handles, opening jars   Difficult hitting buttons on the remote and holding it   2015 stopped working due to hands     Functional Outcome Measure:    Upper Extremity Functional Index Score:  SCORE:   Column Totals: 59/80:   (A lower score indicates greater disability.)    Objective:  Pain Level (Scale 0-10)   5/1/2023   At Rest 0-1   With Use 0-1     Sensation   Glove Like per pt report - DMII w/ neuropathy     ROM  Hand 5/1/2023 5/1/2023   AROM(PROM) R L   Index MP 25/80 -30/95   PIP -70/100 -65/105   DIP -15/60 -35/80   SCHULTZ     Long MP 25/85 -30/80   PIP -40/90 -75/90   DIP na/60 -15/80   SCHULTZ     Ring MP 25/85 -30/85   PIP -60/100 -60/95   DIP -5/50 -25/75   SCHULTZ     Small MP 25/85 -30/90   PIP -60/100 -85/100   DIP -5/60 -30/55   SCHULTZ         Strength   (Measured in pounds)  Pain Report: - none  + mild    ++ moderate    +++ severe    5/1/2023 5/1/2023   Trials R L    1  2  3 35 35   Average       Lat Pinch 5/1/2023 5/1/2023   Trials R L   1  2  3 6 9   Average         Assessment:  Patient presents with symptoms consistent with diagnosis of bilateral digit flexion contractures, with conservative intervention. Pt w/ significant contractures. He additionally demonstrated tightness of extensor musculature, contributing to hand position.     Patient's limitations or Problem List includes:  Decreased ROM/motion, Weakness, Sensory disturbance, Decreased coordination and Tightness in musculature of the bilateral hand which interferes with the patient's ability to perform Self Care Tasks (dressing), Work Tasks, Sleep Patterns, Recreational Activities, Household Chores and Driving  as compared to previous level of function.    Rehab Potential:  Good - Return to full activity, some limitations    Patient will benefit from skilled Occupational Therapy to increase ROM, overall strength and coordination to return to previous activity level and resume normal daily tasks and to reach their rehab potential.    Barriers to Learning:  No barrier    Communication Issues:  Patient appears to be able to clearly communicate and understand verbal and written communication and follow directions correctly.    Chart Review: Chart Review and Simple history review with patient    Identified Performance Deficits: dressing, home establishment and management, meal preparation and cleanup, shopping, sleep, work and leisure activities    Assessment of Occupational Performance:  5 or more Performance Deficits    Clinical Decision Making (Complexity): Moderate complexity    Treatment Explanation:  The following has been discussed with the patient:  RX ordered/plan of care  Anticipated outcomes  Possible risks and side effects    Plan:  Frequency:  1 X week, once daily  Duration:  for 8 weeks    Treatment Plan:    Modalities:    Paraffin   Therapeutic Exercise:    AROM, PROM, Tendon Gliding, Blocking and Reverse  Blocking  Therapeutic Activities:   Functional activities   Neuromuscular re-ed:   Nerve Gliding and Coordination/Dexterity  Manual Techniques:   Joint mobilization and Myofascial release  Orthotic Fabrication:    Static and Static progressive  Self Care:    Self Care Tasks and Ergonomic Considerations    Discharge Plan:  Achieve all LTG.  Independent in home treatment program.  Reach maximal therapeutic benefit.    Home Exercise Program  Use of warmth  Ball massage to wrist extensors  Wrist extensor stretch  Extensor tracking   PIP AROM, reverse blocking    Next Visit  Custom resting hand splints for overnight  Check response to HEP

## 2023-05-01 NOTE — PROGRESS NOTES
[facility]    OUTPATIENT Occupational Therapy ORTHOPEDIC EVALUATION  PLAN OF TREATMENT FOR OUTPATIENT REHABILITATION  (COMPLETE FOR INITIAL CLAIMS ONLY)  Patient's Last Name, First Name, M.I.  YOB: 1960  Steve Morgan    Provider s Name:  [facility]   Medical Record No.  [id number]   Start of Care Date:  05/01/23   Onset Date:   03/28/23   Treatment Diagnosis:  Nikko hand contractures Medical Diagnosis:  Contracture of hand joint, unspecified laterality       Goals:     05/01/23 0500   Goal #1   Goal #1 household chores   Previous Performance Level Independent   Current Functional Task    Current Performance Level max difficulty   STG Target Perfomance Open a tight or new jar   STG Target Perform Level mild or less difficulty, AE as needed   Due Date 05/29/23   LTG Target Task/Performance No Difficulty  (AE as needed)   Due Date 06/26/23         Therapy Frequency:  1x/week  Predicted Duration of Therapy Intervention:  8 weeks    Ramona Holley OTR                 I CERTIFY THE NEED FOR THESE SERVICES FURNISHED UNDER        THIS PLAN OF TREATMENT AND WHILE UNDER MY CARE     (Physician attestation of this document indicates review and certification of the therapy plan).                     Certification Date From:  05/01/23   Certification Date To:  06/26/23    Referring Provider:  Lisa Pierre    Initial Assessment        See Epic Evaluation SOC Date: 05/01/23

## 2023-05-02 ENCOUNTER — LAB (OUTPATIENT)
Dept: LAB | Facility: CLINIC | Age: 63
End: 2023-05-02
Payer: MEDICARE

## 2023-05-02 DIAGNOSIS — Z79.4 TYPE 2 DIABETES MELLITUS WITH DIABETIC NEUROPATHY, WITH LONG-TERM CURRENT USE OF INSULIN (H): ICD-10-CM

## 2023-05-02 DIAGNOSIS — E11.40 TYPE 2 DIABETES MELLITUS WITH DIABETIC NEUROPATHY, WITH LONG-TERM CURRENT USE OF INSULIN (H): ICD-10-CM

## 2023-05-02 LAB — HBA1C MFR BLD: 7.6 % (ref 0–5.6)

## 2023-05-02 PROCEDURE — 83036 HEMOGLOBIN GLYCOSYLATED A1C: CPT

## 2023-05-02 PROCEDURE — 36415 COLL VENOUS BLD VENIPUNCTURE: CPT

## 2023-05-02 PROCEDURE — 82565 ASSAY OF CREATININE: CPT

## 2023-05-02 PROCEDURE — 84132 ASSAY OF SERUM POTASSIUM: CPT

## 2023-05-02 NOTE — TELEPHONE ENCOUNTER
Kinjal Nunn, PT-    FYI, provider placed orders for patient.     RN faxed order to orthotics & prosthetics as requested.     Patient calls to find out if A1C lab performed at last lab only visit. Chart review, future orders in place from Dr. Meraz but labs not performed. Scheduled patient for lab only visit today.    Yudith GARCIA RN, BSN, PHN - PAL (patient advocate liaison)  [facility]  [phone]

## 2023-05-02 NOTE — TELEPHONE ENCOUNTER
I signed the order and it's in my outbox, but I'm not sure if it has to be faxed. Can RN check on this?

## 2023-05-03 ENCOUNTER — ANCILLARY PROCEDURE (OUTPATIENT)
Dept: GENERAL RADIOLOGY | Facility: CLINIC | Age: 63
End: 2023-05-03
Payer: MEDICARE

## 2023-05-03 ENCOUNTER — OFFICE VISIT (OUTPATIENT)
Dept: FAMILY MEDICINE | Facility: CLINIC | Age: 63
End: 2023-05-03
Payer: MEDICARE

## 2023-05-03 ENCOUNTER — TELEPHONE (OUTPATIENT)
Dept: FAMILY MEDICINE | Facility: CLINIC | Age: 63
End: 2023-05-03

## 2023-05-03 VITALS
HEART RATE: 68 BPM | SYSTOLIC BLOOD PRESSURE: 143 MMHG | BODY MASS INDEX: 39.32 KG/M2 | TEMPERATURE: 98.1 F | DIASTOLIC BLOOD PRESSURE: 77 MMHG | OXYGEN SATURATION: 99 % | WEIGHT: 236 LBS | HEIGHT: 65 IN | RESPIRATION RATE: 20 BRPM

## 2023-05-03 DIAGNOSIS — Z79.4 TYPE 2 DIABETES MELLITUS WITH DIABETIC NEUROPATHY, WITH LONG-TERM CURRENT USE OF INSULIN (H): ICD-10-CM

## 2023-05-03 DIAGNOSIS — T17.908A ASPIRATION INTO AIRWAY, INITIAL ENCOUNTER: ICD-10-CM

## 2023-05-03 DIAGNOSIS — T17.908A ASPIRATION INTO AIRWAY, INITIAL ENCOUNTER: Primary | ICD-10-CM

## 2023-05-03 DIAGNOSIS — E11.40 TYPE 2 DIABETES MELLITUS WITH DIABETIC NEUROPATHY, WITH LONG-TERM CURRENT USE OF INSULIN (H): ICD-10-CM

## 2023-05-03 LAB
CREAT SERPL-MCNC: 1.36 MG/DL (ref 0.67–1.17)
GFR SERPL CREATININE-BSD FRML MDRD: 58 ML/MIN/1.73M2
POTASSIUM SERPL-SCNC: 5.2 MMOL/L (ref 3.4–5.3)

## 2023-05-03 PROCEDURE — 99213 OFFICE O/P EST LOW 20 MIN: CPT

## 2023-05-03 PROCEDURE — 71046 X-RAY EXAM CHEST 2 VIEWS: CPT | Mod: TC | Performed by: RADIOLOGY

## 2023-05-03 ASSESSMENT — ENCOUNTER SYMPTOMS
HYPOTENSION: 0
MEMORY LOSS: 1
STIFFNESS: 0
SPEECH CHANGE: 0
JOINT SWELLING: 0
HOARSE VOICE: 0
NERVOUS/ANXIOUS: 1
PARALYSIS: 0
MYALGIAS: 1
MUSCLE WEAKNESS: 0
NUMBNESS: 1
INSOMNIA: 1
EXERCISE INTOLERANCE: 0
SLEEP DISTURBANCES DUE TO BREATHING: 0
SEIZURES: 0
WEAKNESS: 0
TROUBLE SWALLOWING: 0
PANIC: 0
SORE THROAT: 0
EYE WATERING: 0
SYNCOPE: 0
HEADACHES: 1
SINUS CONGESTION: 0
LIGHT-HEADEDNESS: 1
EYE IRRITATION: 0
HYPERTENSION: 1
TASTE DISTURBANCE: 0
SINUS PAIN: 0
DEPRESSION: 1
DOUBLE VISION: 0
ORTHOPNEA: 0
LEG PAIN: 0
NECK PAIN: 1
DISTURBANCES IN COORDINATION: 1
TREMORS: 1
BACK PAIN: 0
EYE PAIN: 0
NECK MASS: 0
DECREASED CONCENTRATION: 1
PALPITATIONS: 0
EYE REDNESS: 0
TINGLING: 0
ARTHRALGIAS: 0
MUSCLE CRAMPS: 0
DIZZINESS: 1
SMELL DISTURBANCE: 0
LOSS OF CONSCIOUSNESS: 0

## 2023-05-03 NOTE — TELEPHONE ENCOUNTER
"Dr Rowe (pt dentist) calls,     S-(situation): pt had appointment today    B-(background): pt had gold crown, pt thinks may have swallowed it, pt \"can still feel it\", dentist wants to make sure he did not aspirate it, pt denies trouble breathing or swallowing, dentist wants CHEST XRAY just in case    A-(assessment): swallowed gold crown    R-(recommendations): discussed with BRENDA, ok for visit today with same day provider for CHEST XRAY, appointment scheduled today, Dr Rowe will inform pt, routed FYI to BRENDA and KAELA Bond, RN, BSN  [facility]      "

## 2023-05-03 NOTE — PROGRESS NOTES
"  Assessment & Plan     (T17.247Y) Aspiration into airway, initial encounter  (primary encounter diagnosis)  Comment: xray to rule out dental crown aspiration. Do not see any obvious concerns, will await radiologist reading to verify. Will follow up on results when obtained. Patient airway stable at this point, no concern for obstruction given negative lung exam. Patient BP a bit elevated today, however patient anxious. Suspect elevation d/t this. Discussed what next steps will be, at this point if crown swallowed allow to pass through GI system. Patient agreeable with plan of care and at this point patient will follow up as needed unless acute concerns arise in the meantime.  Plan: XR Chest 2 Views    (E11.40,  Z79.4) Type 2 diabetes mellitus with diabetic neuropathy, with long-term current use of insulin (H)  Comment: managed by endocrine, visit scheduled for tomorrow.   Plan: as above.            BRIDGET Reynolds CNP  [facility] [place]    Subjective   Caio is a 63 year old, presenting for the following health issues:  No chief complaint on file.        5/3/2023     1:36 PM   Additional Questions   Roomed by Marc Lord CMA     HPI     pt had gold crown, pt thinks may have swallowed it, pt \"can still feel it\", dentist wants to make sure he did not aspirate it, pt denies trouble breathing or swallowing, dentist wants CHEST XRAY just in case    Denies SOB, does have some irritation/discomfort at xyphoid process, feels it may have been when crown was swallowed   Denies SOB, lightheadedness/dizziness    Review of Systems   Constitutional, HEENT, cardiovascular, pulmonary, gi and gu systems are negative, except as otherwise noted.      Objective    BP (!) 143/77 (BP Location: Right arm, Patient Position: Sitting, Cuff Size: Adult Large)   Pulse 68   Temp 98.1  F (36.7  C) (Oral)   Resp 20   Ht 1.651 m (5' 5\")   Wt 107 kg (236 lb)   SpO2 99%   BMI 39.27 kg/m    Body mass index is 39.27 " kg/m .  Physical Exam  Vitals and nursing note reviewed.   Constitutional:       General: He is not in acute distress.     Appearance: Normal appearance.   Cardiovascular:      Rate and Rhythm: Normal rate and regular rhythm.      Heart sounds: No murmur heard.     No friction rub. No gallop.   Pulmonary:      Effort: Pulmonary effort is normal. No respiratory distress.      Breath sounds: No wheezing, rhonchi or rales.   Skin:     General: Skin is warm and dry.   Neurological:      Mental Status: He is alert.   Psychiatric:         Mood and Affect: Mood normal.         Behavior: Behavior normal. Behavior is cooperative.         Thought Content: Thought content normal.         Judgment: Judgment normal.

## 2023-05-04 ENCOUNTER — OFFICE VISIT (OUTPATIENT)
Dept: ENDOCRINOLOGY | Facility: CLINIC | Age: 63
End: 2023-05-04
Payer: MEDICARE

## 2023-05-04 VITALS
HEIGHT: 65 IN | OXYGEN SATURATION: 99 % | HEART RATE: 68 BPM | SYSTOLIC BLOOD PRESSURE: 125 MMHG | TEMPERATURE: 96.9 F | WEIGHT: 237.2 LBS | RESPIRATION RATE: 18 BRPM | DIASTOLIC BLOOD PRESSURE: 69 MMHG | BODY MASS INDEX: 39.52 KG/M2

## 2023-05-04 DIAGNOSIS — Z79.4 TYPE 2 DIABETES MELLITUS WITH DIABETIC NEUROPATHY, WITH LONG-TERM CURRENT USE OF INSULIN (H): Primary | ICD-10-CM

## 2023-05-04 DIAGNOSIS — E03.4 HYPOTHYROIDISM DUE TO ACQUIRED ATROPHY OF THYROID: ICD-10-CM

## 2023-05-04 DIAGNOSIS — E78.5 HYPERLIPIDEMIA LDL GOAL <100: ICD-10-CM

## 2023-05-04 DIAGNOSIS — N18.30 STAGE 3 CHRONIC KIDNEY DISEASE, UNSPECIFIED WHETHER STAGE 3A OR 3B CKD (H): ICD-10-CM

## 2023-05-04 DIAGNOSIS — Z79.4 LONG TERM (CURRENT) USE OF INSULIN (H): ICD-10-CM

## 2023-05-04 DIAGNOSIS — E11.40 TYPE 2 DIABETES MELLITUS WITH DIABETIC NEUROPATHY, WITH LONG-TERM CURRENT USE OF INSULIN (H): Primary | ICD-10-CM

## 2023-05-04 DIAGNOSIS — E11.42 DIABETIC POLYNEUROPATHY ASSOCIATED WITH TYPE 2 DIABETES MELLITUS (H): ICD-10-CM

## 2023-05-04 PROCEDURE — 95251 CONT GLUC MNTR ANALYSIS I&R: CPT | Performed by: INTERNAL MEDICINE

## 2023-05-04 PROCEDURE — 99214 OFFICE O/P EST MOD 30 MIN: CPT | Performed by: INTERNAL MEDICINE

## 2023-05-04 RX ORDER — INSULIN GLARGINE 100 [IU]/ML
36 INJECTION, SOLUTION SUBCUTANEOUS DAILY
Qty: 30 ML | Refills: 0 | OUTPATIENT
Start: 2023-05-04 | End: 2023-05-04

## 2023-05-04 RX ORDER — INSULIN GLARGINE 100 [IU]/ML
35 INJECTION, SOLUTION SUBCUTANEOUS DAILY
Qty: 30 ML | Refills: 0 | Status: SHIPPED | OUTPATIENT
Start: 2023-05-04 | End: 2023-06-30

## 2023-05-04 NOTE — NURSING NOTE
ENDOCRINOLOGY INTAKE FORM    Patient Name:  Steve Morgan  :  1960    Is patient Diabetic?   Yes: type 2  Does patient have non-diabetic or other endocrine issues?  Yes: Chronic hyponatremia  Dyslipidemia  HYPERLIPIDEMIA LDL GOAL <100  Hyponatremia  Hypothyroidism  Morbid obesity due to excess calories (H)    Vitals: There were no vitals taken for this visit.  BMI= There is no height or weight on file to calculate BMI.    Flu vaccine:  Yes:   Pneumonia vaccine:  Yes: 23 x 2, 20 x 1    Smoking and Alcohol use:  Social History     Tobacco Use     Smoking status: Never     Smokeless tobacco: Never   Vaping Use     Vaping status: Never Used   Substance Use Topics     Alcohol use: Yes     Comment: Occassionally     Drug use: No       Foot Exam: Yes: 02/15/23  Eye Exam:  Yes: 22  Dental Exam:    Aspirin Use:  Yes: 81 mg    Lab Results   Component Value Date    A1C 7.6 2023    A1C 8.1 2023    A1C 9.3 2023    A1C 9.2 2022    A1C 8.6 2022    A1C 7.4 2021    A1C 9.9 2020    A1C 11.7 2019    A1C 11.6 2019    A1C 11.8 2018       Lab Results   Component Value Date    MICROL 78.3 2023    MICROL 84 2021    MICROL 14 2020     No results found for: MICROALBUMIN    Anisha Diop [facility] [place]  [phone]

## 2023-05-04 NOTE — PROGRESS NOTES
"Name: Steve \"Caio\" Cathy  F/u for Diabetes and hypothyroidism.  HPI:  For f/u of DM.    has a past medical history of Cellulitis and abscess of trunk (6/27/2017), Depression, Hyperlipidemia LDL goal <100 (10/31/2010), Hypertension goal BP (blood pressure) < 140/90 (3/17/2011), Hypothyroidism (1/12/2010), Morbid obesity due to excess calories (H) (1/12/2010), Neuropathy in diabetes (H) (1/12/2010), Obesity (1/12/2010), Sleep apnea (1/12/2010), and Type 2 diabetes, HbA1C goal < 8% (H) (3/8/2011).    He has challenges with blood sugar testing due to limited use of his hands (neuropathy+ contractures + tremor - both significant) and increasing difficulty with ADL's.  Can't test his blood sugars using a meter due to the above.   He also has an hard time inserting the Nina sensors due to neuropathy and tremor.  History of poor compliance in past.  Was started on Novolog in past but not currently taking it.  He has stopping drinking pop since Sep 2021.  Exercise is limited.  CKD stage 2- followed by nephrology.  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  He underwent I&D and he was on partial amputation.  Earlier noted mild hyperkalemia which since has resolved.  Most recent potassium levels are in normal range.  Was using nina but has not picked up sensors in last 1 month.  Diet- trying to eat healthy.  He was on Jardaince-- not taking as it was expensive. He reports that other SGLT-2 inh are also expensive.  History of intermittent hypercalcemia.  Recent potassium levels were in range.  Wt Readings from Last 2 Encounters:   05/04/23 107.6 kg (237 lb 3.2 oz)   05/03/23 107 kg (236 lb)     1. Type 2 DM:  Orginally diagnosed at the age of 33 or 38.  Was prediabetic prior, was not particularly symptomatic at the time, was noted on routine lab work    Current Regimen:   Metformin 1000 mg BID  Glipizide XL 10 mg/day  Lantus 30 units at night    The plan was to start Trulicity but the " copay was not affordable ($100 per refill).  Jardiance was expensive as noted above.  Reports that other SGLT2 inh are also not covered by insurance.    yes:     Diabetes Medication(s)     Biguanides       metFORMIN (GLUCOPHAGE) 1000 MG tablet    Take 1 tablet (1,000 mg) by mouth 2 times daily (with meals)    Insulin       insulin glargine (BASAGLAR KWIKPEN) 100 UNIT/ML pen    Inject 30 Units Subcutaneous daily     LANTUS SOLOSTAR 100 UNIT/ML soln    Inject 25 Units Subcutaneous At Bedtime    Sodium-Glucose Co-Transporter 2 (SGLT2) Inhibitors       empagliflozin (JARDIANCE) 10 MG TABS tablet    Take 1 tablet (10 mg) by mouth daily    Sulfonylureas       glipiZIDE (GLUCOTROL XL) 10 MG 24 hr tablet    Take 1 tablet (10 mg) by mouth daily        BS checks: [employer]ok[employer]ok  Nina Download: Blood glucose data reviewed personally. See nursing note from this encounter for details.  Unable to use glucose meter - he has a hard time doing fingerstick testing due to his tremor.    Complications:   Diabetes Complications  Description / Detail    Diabetic Retinopathy  No retinopathy,last exam 2/2020, [facility]   CAD / PAD  No   Neuropathy  Yes + diabetic neuropathy: numbness hands and feet.  Saw podiatry, Dr. Mathis, 1/27/2018- using diabetic shoes   Nephropathy / Microalbuminuria  Yes, elevated urine microalbumin   Gastroparesis  No   Hypoglycemia [place]  Not sure, gets 'kind of dizzy' when blood sugar is low but reports history of low blood sugars without symptoms     2. Hypertension: Blood Pressure:   BP Readings from Last 3 Encounters:   05/04/23 125/69   05/03/23 (!) 143/77   04/28/23 (!) 156/85   Blood pressure medications include atenolol 25 mg qd and losartan 50 mg every day.   3. Hyperlipidemia: Takes simvastatin 20 mg for lipid control.       4.  Hypothyroidism. Currently treated with levoxyl (KAMERON) 137 mcg daily. Takes the levoxyl first thing in the morning and waits 30+ minutes before eating breakfast.   1/2023 labs in  range.  PMH/PSH:  Past Medical History:   Diagnosis Date     Cellulitis and abscess of trunk 6/27/2017     Depression      Hyperlipidemia LDL goal <100 10/31/2010     Hypertension goal BP (blood pressure) < 140/90 3/17/2011     Hypothyroidism 1/12/2010     Morbid obesity due to excess calories (H) 1/12/2010     Neuropathy in diabetes (H) 1/12/2010     Obesity 1/12/2010     Sleep apnea 1/12/2010    CPAP     Type 2 diabetes, HbA1C goal < 8% (H) 3/8/2011     Past Surgical History:   Procedure Laterality Date     BIOPSY       COLONOSCOPY       EYE SURGERY       GENITOURINARY SURGERY      surg for undescended testicle     IRRIGATION AND DEBRIDEMENT HAND, COMBINED Right 12/23/2022    Procedure: Right long finger irrigation and debridement with partial amputation of the right long finger. ;  Surgeon: Colby English MD;  Location: [facility] OR     REPAIR HAMMER TOE BILATERAL  5/16/2013    Procedure: REPAIR HAMMER TOE BILATERAL;  Flexor Tenotomy Toes 2,3,4,5 Bilateral Feet;  Surgeon: Saad Bangura DPM;  Location: [facility] OR     Family Hx:  Family History   Problem Relation Age of Onset     Breast Cancer Mother      Hypertension Mother      Thyroid Disease Mother      Depression Mother      Alzheimer Disease Mother 82     Cancer - colorectal Father      Thyroid Disease Father      Depression Father      Other - See Comments Father         bladder polyps     Heart Disease Maternal Grandmother         CHF     Circulatory Paternal Grandmother      Cancer Paternal Grandfather      Diabetes Brother      Diabetes Brother      Asthma Brother      Thyroid disease: Yes: mother         DM2: Yes: one brother with type 1 diabetes and one brother with type 2 diabetes, paternal aunt with DM2         Autoimmune: DM1, SLE, RA, Vitiligo Yes: brother with DM1    Social Hx:  Social History     Socioeconomic History     Marital status:      Spouse name: Sri     Number of children: 2     Years of education: Not on file     Highest  education level: Associate degree: occupational, technical, or vocational program   Occupational History     Occupation:      Employer: NONE      Comment: [employer]   Tobacco Use     Smoking status: Never     Smokeless tobacco: Never   Vaping Use     Vaping status: Never Used   Substance and Sexual Activity     Alcohol use: Yes     Comment: Occassionally     Drug use: No     Sexual activity: Not Currently     Partners: Female     Birth control/protection: Abstinence   Other Topics Concern     Parent/sibling w/ CABG, MI or angioplasty before 65F 55M? No   Social History Narrative     Not on file     Social Determinants of Health     Financial Resource Strain: Low Risk  (6/13/2022)    Overall Financial Resource Strain (CARDIA)      Difficulty of Paying Living Expenses: Not hard at all   Food Insecurity: No Food Insecurity (6/13/2022)    Hunger Vital Sign      Worried About Running Out of Food in the Last Year: Never true      Ran Out of Food in the Last Year: Never true   Transportation Needs: No Transportation Needs (6/13/2022)    PRAPARE - Transportation      Lack of Transportation (Medical): No      Lack of Transportation (Non-Medical): No   Physical Activity: Inactive (6/13/2022)    Exercise Vital Sign      Days of Exercise per Week: 0 days      Minutes of Exercise per Session: 0 min   Stress: No Stress Concern Present (6/13/2022)    [employer] - Occupational Stress Questionnaire      Feeling of Stress : Not at all   Social Connections: Moderately Integrated (6/13/2022)    Social Connection and Isolation Panel [NHANES]      Frequency of Communication with Friends and Family: Once a week      Frequency of Social Gatherings with Friends and Family: Once a week      Attends [employer] Services: 1 to 4 times per year      Active Member of Clubs or Organizations: Yes      Attends Club or Organization Meetings: Not on file      Marital Status:    Intimate Partner Violence:  "Not on file   Housing Stability: Low Risk  (6/13/2022)    Housing Stability Vital Sign      Unable to Pay for Housing in the Last Year: No      Number of Places Lived in the Last Year: 1      Unstable Housing in the Last Year: No          MEDICATIONS:  has a current medication list which includes the following prescription(s): acetaminophen, aspirin, atenolol, bupropion, calcium carb-cholecalciferol, freestyle juan 2 reader, freestyle juan 2 sensor, cyanocobalamin, empagliflozin, ferrous sulfate, finasteride, fluticasone, glipizide, ibuprofen, insulin glargine, lantus solostar, latanoprost, levoxyl, losartan, metformin, multi-vitamin, ondansetron, pantoprazole, polyethylene glycol, senna-docusate, simvastatin, urea, and cholecalciferol.    ROS     ROS: 10 point ROS neg other than the symptoms noted above in the HPI.    PE:  /69 (BP Location: Left arm, Patient Position: Chair, Cuff Size: Adult Large)   Pulse 68   Temp 96.9  F (36.1  C) (Tympanic)   Resp 18   Ht 1.651 m (5' 5\")   Wt 107.6 kg (237 lb 3.2 oz)   SpO2 99%   BMI 39.47 kg/m    GENERAL: healthy, alert and no distress  EYES: Eyes grossly normal to inspection, conjunctivae and sclerae normal  ENT: no nose swelling, nasal discharge.  Thyroid: no apparent thyroid nodules  RESP: no audible wheeze, cough, or visible cyanosis.  No visible retractions or increased work of breathing.  Able to speak fully in complete sentences.  ABDO: not evaluated.  EXTREMITIES: no hand tremors.  NEURO: Cranial nerves grossly intact, mentation intact and speech normal  SKIN: No apparent skin lesions, rash or edema seen   PSYCH: mentation appears normal, affect normal/bright, judgement and insight intact, normal speech and appearance well-groomed.  DM FOOT EXAM: Markedly decreased sensory exam and normal monofilament exam on bilateral lower extremity.    LABS:    Component      Latest Ref Rng & Units 8/29/2019   Glucose 316   C-Peptide      0.9 - 6.9 ng/mL 2.5 "     A1c:  Lab Results   Component Value Date    A1C 7.6 05/02/2023    A1C 8.1 01/27/2023    A1C 9.3 01/07/2023    A1C 9.2 09/01/2022    A1C 8.6 03/21/2022    A1C 7.4 02/22/2021    A1C 9.9 03/12/2020    A1C 11.7 12/05/2019    A1C 11.6 08/29/2019    A1C 11.8 12/14/2018     Basic Metabolic Panel:  Creatinine   Date Value Ref Range Status   05/02/2023 1.36 (H) 0.67 - 1.17 mg/dL Final   01/16/2020 1.01 0.66 - 1.25 mg/dL Final     Urine microabumin:  Lab Results   Component Value Date    UMALCR 116.17 02/06/2023    UMALCR 120.00 07/30/2021    UMALCR 81.46 03/05/2020          LFTS/Cholesterol Panel:  Recent Labs   Lab Test 01/27/23  1126 01/08/23  0549 05/19/16  0804 05/21/15  1104   CHOL 127 114   < > 137   HDL 33* 30*   < > 30*   LDL 71 64   < > 78   TRIG 113 99   < > 144   CHOLHDLRATIO  --   --   --  4.6    < > = values in this interval not displayed.       Thyroid Function:  ENDO THYROID LABS-[facility] Latest Ref Rng & Units 1/27/2023   THYROID STIMULATING HORMONE (R) 0.30 - 4.20 uIU/mL 0.78   FREE T3 2.3 - 4.2 pg/mL    FREE T4 0.90 - 1.70 ng/dL 1.65     Component    Latest Ref Rng & Units 10/19/2018   Thyroid Peroxidase Antibody    <35 IU/mL 11   Thyroglobulin Antibody    <40 IU/mL <20   Thyroid Stim Immunog    <=1.3 TSI index <1.0     All pertinent notes, labs, and images personally reviewed by me.     A/P  Mr.Walter Morgan is a 62 year old evaluated via phone visit for the management of:    1. DM2 - Under good control. A1c 7.6%.  Diabetes is complicated by neuropathy and nephropathy/elevated urine microalbumin.  He is followed by nephrology.  Can't test his blood sugars using a meter due to the above and will benefit from use of continuous glucose monitor.  No recent blood sugar data to review but reviewed juan data from March-April.  In general blood sugar appears on higher side.  Noted that blood sugars are higher during the day.  Plan:  Discussed diagnosis, pathophysiology, management and treatment options of  condition with pt.  I also discussed importance of strict blood sugar control to prevent complications associated with uncontrolled diabetes.  Start using juan sensors.  Rx.  Increase Basaglar to 35 Units/day  Continue rest regimen.  Check if Jardiance is covered at [employer] club.  Follow up with CDE in 6 weeks.  Labs and follow up in 3-4 months.  Continue to monitor kidney function closely and adjust metformin dose accordingly.  Please make a lab appointment for blood work and follow up clinic appointment in 1 week after that to discuss results.    2. Hypothyroidism.  Currently treated with Levoxyl (KAMERON) 137 mcg/day.  Clinically and biochemically euthyroid.   1/2023 Labs in range.  Plan:  Discussed diagnosis, pathophysiology, management and treatment options of condition with pt.  Continue Levoxyl 137 mcg/day.   Labs in 1 year (1/2024 onwards) or sooner if concerns.    Discussed s/s of hypothyroidism and hyperthyroidism to watch for.  The patient indicates understanding of these issues and agrees with the plan.    3. Hyperlipidemia - On statin therapy. Managed by PCP. Not discussed.    4. Prevention:  Opthalmology-Yes: recommend annually  Dental-Yes:recommend twice a year  ASA-Yes, 81 mg qd   Smoking- No    Most Recent Immunizations   Administered Date(s) Administered     COVID-19 Bivalent 12+ (Pfizer) 10/15/2022     COVID-19 MONOVALENT 12+ (Pfizer) 10/15/2022     COVID-19 Monovalent Booster 18+ (Moderna) 04/12/2022     COVID-19 Vaccine (Vi) 03/25/2021     DTaP, Unspecified 11/08/2010     Flu, Unspecified 11/15/2016     [id number] Novel Flu 11/04/2009     Hepatitis A (ADULT 19+) 04/17/2019     Influenza (H1N1) 11/04/2009     Influenza (IIV3) PF 10/01/2012     Influenza Vaccine 50-64 or 18-64 w/egg allergy (Flublok) 10/15/2022     Influenza Vaccine >6 months (Alfuria,Fluzone) 09/07/2017     Influenza Vaccine, 6+MO IM (QUADRIVALENT W/PRESERVATIVES) 11/15/2016     MMR 03/21/1995     Pneumococcal (PCV 7)  06/15/2022     Pneumococcal 20 valent Conjugate (Prevnar 20) 06/15/2022     Pneumococcal 23 valent 10/05/2012     TD,PF 7+ (Tenivac) 03/21/1995     TDAP (Adacel,Boostrix) 09/24/2021     TDAP Vaccine (Adacel) 11/08/2010     Typhoid IM 10/16/2018     Zoster recombinant adjuvanted (SHINGRIX) 12/03/2020     Follow-up:  3-4 months.    Cindi Meraz MD  Endocrinology   [facility]  CC: Lisa Pierre      Answers for HPI/ROS submitted by the patient on 5/3/2023  General Symptoms: No  Skin Symptoms: No  HENT Symptoms: Yes  EYE SYMPTOMS: Yes  HEART SYMPTOMS: Yes  LUNG SYMPTOMS: No  INTESTINAL SYMPTOMS: No  URINARY SYMPTOMS: No  REPRODUCTIVE SYMPTOMS: No  SKELETAL SYMPTOMS: Yes  BLOOD SYMPTOMS: No  NERVOUS SYSTEM SYMPTOMS: Yes  MENTAL HEALTH SYMPTOMS: Yes  Ear pain: No  Ear discharge: No  Hearing loss: No  Tinnitus: Yes  Nosebleeds: No  Congestion: No  Sinus pain: No  Trouble swallowing: No   Voice hoarseness: No  Mouth sores: No  Sore throat: No  Tooth pain: No  Gum tenderness: No  Bleeding gums: Yes  Change in taste: No  Change in sense of smell: No  Dry mouth: No  Hearing aid used: No  Neck lump: No  Eye pain: No  Vision loss: No  Dry eyes: No  Watery eyes: No  Eye bulging: No  Double vision: No  Flashing of lights: No  Spots: No  Floaters: Yes  Redness: No  Crossed eyes: No  Tunnel Vision: No  Yellowing of eyes: No  Eye irritation: No  Chest pain or pressure: No  Fast or irregular heartbeat: No  Pain in legs with walking: No  Trouble breathing while lying down: No  Fingers or toes appear blue: No  High blood pressure: Yes  Low blood pressure: No  Fainting: No  Murmurs: No  Pacemaker: No  Varicose veins: No  Edema or swelling: No  Wake up at night with shortness of breath: No  Light-headedness: Yes  Exercise intolerance: No  Back pain: No  Muscle aches: Yes  Neck pain: Yes  Swollen joints: No  Joint pain: No  Bone pain: No  Muscle cramps: No  Muscle weakness: No  Joint stiffness: No  Bone  fracture: No  Trouble with coordination: Yes  Dizziness or trouble with balance: Yes  Fainting or black-out spells: No  Memory loss: Yes  Headache: Yes  Seizures: No  Speech problems: No  Tingling: No  Tremor: Yes  Weakness: No  Difficulty walking: Yes  Paralysis: No  Numbness: Yes  Nervous or Anxious: Yes  Depression: Yes  Trouble sleeping: Yes  Trouble thinking or concentrating: Yes  Mood changes: Yes  Panic attacks: No

## 2023-05-04 NOTE — PATIENT INSTRUCTIONS
[facility]  Dr Meraz, Endocrinology Department    [facility]   [address] [facility]  [place] [address]  Appointment [phone]  Fax: [phone]  [place]: Monday - Thursday      Please check the cost coverage and copay with insurance before recommended tests, services and medications (especially if new medications are prescribed).     If ordered, please get blood work done 1 week prior to your next appointment so they will be available to Dr. Meraz at your visit.    To provide the best diabetic care, please bring your blood glucose meter to each and every visit with your  Endocrinologist. Your blood glucose CGM/meter/insulin pump will be downloaded at every appointment.  Please arrive 15 minutes before your scheduled appointment. This will allow for your blood glucose CGM/meter/insulin pump  to be downloaded.  If you are wearing DEXCOM please bring  or sharing code from the Dexcom Clarity [place] so that it can be downloaded.  If you are using FREESTYLE JUAN personal sensors please bring the reader.      Diabetes:  Start using juan sensors.  Rx.  Increase Basaglar to 35 Units/day  Continue rest regimen.  Check if Jardiance is covered at [employer].  Follow up with CDE in 6 weeks.  Labs and follow up in 3-4 months.  Please make a lab appointment for blood work and follow up clinic appointment in 1 week after that to discuss results.    Your provider has referred you to Diabetes Education: For all [facility]:  Phone [phone]; Fax [phone]  Please call and make the appointment.      Thyroid:  Continue Levoxyl 137 mcg/day.   Labs in 1 year (2024 onwards) or sooner if concerns.    Take Levothyroxine on an empty stomach. Take it with a full glass of water at least 30 minutes to 1 hour before eating breakfast.   This medicine should be taken at least 4 hours before or 4 hours after these medicines: antacids (Maalox , Mylanta ,  Tums ), calcium supplements, cholestyramine (Prevalite , Questran ), colestipol (Colestid ), iron supplements, orlistat (James , Xenical ), simethicone (Gas-X , Mylicon ), and sucralfate (Carafate ).   Swallow the capsule whole. Do not cut or crush it.     Recommend checking blood sugars before meals and at bedtime.    If Blood glucose are low more often-> 2-3 times/week- give us a call.  Make better food choices: reduce carbs, Reduce portion size, weight loss and exercise 3-4 times a week.    What is hypoglycemia:  Hypoglycemia is when blood sugar levels become too low - below 70 m/dl.      What causes hypoglycemia?  - using too much insulin  -taking too many diabetes pills  -not eating enough, or skipping meals or snacks  -not eating enough carbohydrate with meals  -changing your exercise routine  -drinking alcohol in excess    It is also possible to have hypoglycemia even when you are carefully managing your blood sugar levels.    What does it feel like when blood sugars get too low?  You may feel:  - anxious  -confused  -dizzy  -hungry  -light-headed  -nervous  -shaky  -sleepy  -sweaty    You may have  -blurred or cloudy vision  -heart palpitations (heart skips a beat or races)  -tingling or numbness around the mouth and tongue  -tremors    What to do if you have symptoms of hypoglycmemia:  If you think your blood sugar is too low, check it with a glucose meter.  If its below 70 mg/dl, consume one of the following:  Fruit juice (1/2 cup)  Glucose tablets (15 grams)  Hard candy (5 to 7 pieces)  Honey or sugar (2 teaspoons)  Milk (1/2 cup)  Soft drink (non-diet, 1/2 cup)    Wait 15 minutes and check your blood glucose again.  IF it is still below 70 mg/dl, have another food item listed above. Wait another 15 minutes and repeat the blood glucose test.  Have a small meal or snack that contains some carbohydrate after your blood glucose rises above 70 mg/dl.    If you are at risk of hypoglycemia, always carry with you  glucose tablets or one of the foods listed above.      To prevent Hypoglycemia:  Avoid situations that may cause hypoglycemia  Before making any change to your diet or exercise routine, discuss them with your healthcare provider  Keep a record of your blood glucose levels.  Include the time of day, diabetes medications, when you had your last meal or snack, and what you were doing at the time (e.g. Watching TV, gardening, jogging, etc).    Talk to your healthcare provider if your blood glucose levels are often low        Patient guide on hypoglycemia    [email or web address].org/Resources/[email or web address]-diagnosis-[email or web address]-management-[email or web address]

## 2023-05-04 NOTE — LETTER
"    5/4/2023         RE: Steve Morgan  [id number]  [facility] [address]        Dear Colleague,    Thank you for referring your patient, Steve Morgan, to the [facility]. Please see a copy of my visit note below.    Name: Steve \"Caio\" Cathy  F/u for Diabetes and hypothyroidism.  HPI:  For f/u of DM.    has a past medical history of Cellulitis and abscess of trunk (6/27/2017), Depression, Hyperlipidemia LDL goal <100 (10/31/2010), Hypertension goal BP (blood pressure) < 140/90 (3/17/2011), Hypothyroidism (1/12/2010), Morbid obesity due to excess calories (H) (1/12/2010), Neuropathy in diabetes (H) (1/12/2010), Obesity (1/12/2010), Sleep apnea (1/12/2010), and Type 2 diabetes, HbA1C goal < 8% (H) (3/8/2011).    He has challenges with blood sugar testing due to limited use of his hands (neuropathy+ contractures + tremor - both significant) and increasing difficulty with ADL's.  Can't test his blood sugars using a meter due to the above.   He also has an hard time inserting the Nina sensors due to neuropathy and tremor.  History of poor compliance in past.  Was started on Novolog in past but not currently taking it.  He has stopping drinking pop since Sep 2021.  Exercise is limited.  CKD stage 2- followed by nephrology.  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  He underwent I&D and he was on partial amputation.  Earlier noted mild hyperkalemia which since has resolved.  Most recent potassium levels are in normal range.  Was using nina but has not picked up sensors in last 1 month.  Diet- trying to eat healthy.  He was on Jardaince-- not taking as it was expensive. He reports that other SGLT-2 inh are also expensive.  History of intermittent hypercalcemia.  Recent potassium levels were in range.  Wt Readings from Last 2 Encounters:   05/04/23 107.6 kg (237 lb 3.2 oz)   05/03/23 107 kg (236 lb)     1. Type 2 DM:  Orginally diagnosed at " the age of 33 or 38.  Was prediabetic prior, was not particularly symptomatic at the time, was noted on routine lab work    Current Regimen:   Metformin 1000 mg BID  Glipizide XL 10 mg/day  Lantus 30 units at night    The plan was to start Trulicity but the copay was not affordable ($100 per refill).  Jardiance was expensive as noted above.  Reports that other SGLT2 inh are also not covered by insurance.    yes:     Diabetes Medication(s)     Biguanides       metFORMIN (GLUCOPHAGE) 1000 MG tablet    Take 1 tablet (1,000 mg) by mouth 2 times daily (with meals)    Insulin       insulin glargine (BASAGLAR KWIKPEN) 100 UNIT/ML pen    Inject 30 Units Subcutaneous daily     LANTUS SOLOSTAR 100 UNIT/ML soln    Inject 25 Units Subcutaneous At Bedtime    Sodium-Glucose Co-Transporter 2 (SGLT2) Inhibitors       empagliflozin (JARDIANCE) 10 MG TABS tablet    Take 1 tablet (10 mg) by mouth daily    Sulfonylureas       glipiZIDE (GLUCOTROL XL) 10 MG 24 hr tablet    Take 1 tablet (10 mg) by mouth daily        BS checks: R2integrated  Nina Download: Blood glucose data reviewed personally. See nursing note from this encounter for details.  Unable to use glucose meter - he has a hard time doing fingerstick testing due to his tremor.    Complications:   Diabetes Complications  Description / Detail    Diabetic Retinopathy  No retinopathy,last exam 2/2020, [facility]   CAD / PAD  No   Neuropathy  Yes + diabetic neuropathy: numbness hands and feet.  Saw podiatry, Dr. Mathis, 1/27/2018- using diabetic shoes   Nephropathy / Microalbuminuria  Yes, elevated urine microalbumin   Gastroparesis  No   Hypoglycemia [place]  Not sure, gets 'kind of dizzy' when blood sugar is low but reports history of low blood sugars without symptoms     2. Hypertension: Blood Pressure:   BP Readings from Last 3 Encounters:   05/04/23 125/69   05/03/23 (!) 143/77   04/28/23 (!) 156/85   Blood pressure medications include atenolol 25 mg qd and losartan 50 mg every day.    3. Hyperlipidemia: Takes simvastatin 20 mg for lipid control.       4.  Hypothyroidism. Currently treated with levoxyl (KAMERON) 137 mcg daily. Takes the levoxyl first thing in the morning and waits 30+ minutes before eating breakfast.   1/2023 labs in range.  PMH/PSH:  Past Medical History:   Diagnosis Date     Cellulitis and abscess of trunk 6/27/2017     Depression      Hyperlipidemia LDL goal <100 10/31/2010     Hypertension goal BP (blood pressure) < 140/90 3/17/2011     Hypothyroidism 1/12/2010     Morbid obesity due to excess calories (H) 1/12/2010     Neuropathy in diabetes (H) 1/12/2010     Obesity 1/12/2010     Sleep apnea 1/12/2010    CPAP     Type 2 diabetes, HbA1C goal < 8% (H) 3/8/2011     Past Surgical History:   Procedure Laterality Date     BIOPSY       COLONOSCOPY       EYE SURGERY       GENITOURINARY SURGERY      surg for undescended testicle     IRRIGATION AND DEBRIDEMENT HAND, COMBINED Right 12/23/2022    Procedure: Right long finger irrigation and debridement with partial amputation of the right long finger. ;  Surgeon: Colby English MD;  Location: [facility] OR     REPAIR HAMMER TOE BILATERAL  5/16/2013    Procedure: REPAIR HAMMER TOE BILATERAL;  Flexor Tenotomy Toes 2,3,4,5 Bilateral Feet;  Surgeon: Saad Bangura DPM;  Location: [facility] OR     Family Hx:  Family History   Problem Relation Age of Onset     Breast Cancer Mother      Hypertension Mother      Thyroid Disease Mother      Depression Mother      Alzheimer Disease Mother 82     Cancer - colorectal Father      Thyroid Disease Father      Depression Father      Other - See Comments Father         bladder polyps     Heart Disease Maternal Grandmother         CHF     Circulatory Paternal Grandmother      Cancer Paternal Grandfather      Diabetes Brother      Diabetes Brother      Asthma Brother      Thyroid disease: Yes: mother         DM2: Yes: one brother with type 1 diabetes and one brother with type 2 diabetes, paternal aunt with  DM2         Autoimmune: DM1, SLE, RA, Vitiligo Yes: brother with DM1    Social Hx:  Social History     Socioeconomic History     Marital status:      Spouse name: Sri     Number of children: 2     Years of education: Not on file     Highest education level: Associate degree: occupational, technical, or vocational program   Occupational History     Occupation:      Employer: NONE      Comment: [employer]   Tobacco Use     Smoking status: Never     Smokeless tobacco: Never   Vaping Use     Vaping status: Never Used   Substance and Sexual Activity     Alcohol use: Yes     Comment: Occassionally     Drug use: No     Sexual activity: Not Currently     Partners: Female     Birth control/protection: Abstinence   Other Topics Concern     Parent/sibling w/ CABG, MI or angioplasty before 65F 55M? No   Social History Narrative     Not on file     Social Determinants of Health     Financial Resource Strain: Low Risk  (6/13/2022)    Overall Financial Resource Strain (CARDIA)      Difficulty of Paying Living Expenses: Not hard at all   Food Insecurity: No Food Insecurity (6/13/2022)    Hunger Vital Sign      Worried About Running Out of Food in the Last Year: Never true      Ran Out of Food in the Last Year: Never true   Transportation Needs: No Transportation Needs (6/13/2022)    PRAPARE - Transportation      Lack of Transportation (Medical): No      Lack of Transportation (Non-Medical): No   Physical Activity: Inactive (6/13/2022)    Exercise Vital Sign      Days of Exercise per Week: 0 days      Minutes of Exercise per Session: 0 min   Stress: No Stress Concern Present (6/13/2022)    [place] [employer] - Occupational Stress Questionnaire      Feeling of Stress : Not at all   Social Connections: Moderately Integrated (6/13/2022)    Social Connection and Isolation Panel [NHANES]      Frequency of Communication with Friends and Family: Once a week      Frequency of Social Gatherings with  "Friends and Family: Once a week      Attends [employer] Services: 1 to 4 times per year      Active Member of Clubs or Organizations: Yes      Attends Club or Organization Meetings: Not on file      Marital Status:    Intimate Partner Violence: Not on file   Housing Stability: Low Risk  (6/13/2022)    Housing Stability Vital Sign      Unable to Pay for Housing in the Last Year: No      Number of Places Lived in the Last Year: 1      Unstable Housing in the Last Year: No          MEDICATIONS:  has a current medication list which includes the following prescription(s): acetaminophen, aspirin, atenolol, bupropion, calcium carb-cholecalciferol, freestyle juan 2 reader, freestyle juan 2 sensor, cyanocobalamin, empagliflozin, ferrous sulfate, finasteride, fluticasone, glipizide, ibuprofen, insulin glargine, lantus solostar, latanoprost, levoxyl, losartan, metformin, multi-vitamin, ondansetron, pantoprazole, polyethylene glycol, senna-docusate, simvastatin, urea, and cholecalciferol.    ROS     ROS: 10 point ROS neg other than the symptoms noted above in the HPI.    PE:  /69 (BP Location: Left arm, Patient Position: Chair, Cuff Size: Adult Large)   Pulse 68   Temp 96.9  F (36.1  C) (Tympanic)   Resp 18   Ht 1.651 m (5' 5\")   Wt 107.6 kg (237 lb 3.2 oz)   SpO2 99%   BMI 39.47 kg/m    GENERAL: healthy, alert and no distress  EYES: Eyes grossly normal to inspection, conjunctivae and sclerae normal  ENT: no nose swelling, nasal discharge.  Thyroid: no apparent thyroid nodules  RESP: no audible wheeze, cough, or visible cyanosis.  No visible retractions or increased work of breathing.  Able to speak fully in complete sentences.  ABDO: not evaluated.  EXTREMITIES: no hand tremors.  NEURO: Cranial nerves grossly intact, mentation intact and speech normal  SKIN: No apparent skin lesions, rash or edema seen   PSYCH: mentation appears normal, affect normal/bright, judgement and insight intact, normal speech and " appearance well-groomed.  DM FOOT EXAM: Markedly decreased sensory exam and normal monofilament exam on bilateral lower extremity.    LABS:    Component      Latest Ref Rng & Units 8/29/2019   Glucose 316   C-Peptide      0.9 - 6.9 ng/mL 2.5     A1c:  Lab Results   Component Value Date    A1C 7.6 05/02/2023    A1C 8.1 01/27/2023    A1C 9.3 01/07/2023    A1C 9.2 09/01/2022    A1C 8.6 03/21/2022    A1C 7.4 02/22/2021    A1C 9.9 03/12/2020    A1C 11.7 12/05/2019    A1C 11.6 08/29/2019    A1C 11.8 12/14/2018     Basic Metabolic Panel:  Creatinine   Date Value Ref Range Status   05/02/2023 1.36 (H) 0.67 - 1.17 mg/dL Final   01/16/2020 1.01 0.66 - 1.25 mg/dL Final     Urine microabumin:  Lab Results   Component Value Date    UMALCR 116.17 02/06/2023    UMALCR 120.00 07/30/2021    UMALCR 81.46 03/05/2020          LFTS/Cholesterol Panel:  Recent Labs   Lab Test 01/27/23  1126 01/08/23  0549 05/19/16  0804 05/21/15  1104   CHOL 127 114   < > 137   HDL 33* 30*   < > 30*   LDL 71 64   < > 78   TRIG 113 99   < > 144   CHOLHDLRATIO  --   --   --  4.6    < > = values in this interval not displayed.       Thyroid Function:  ENDO THYROID LABS-[facility] Latest Ref Rng & Units 1/27/2023   THYROID STIMULATING HORMONE (R) 0.30 - 4.20 uIU/mL 0.78   FREE T3 2.3 - 4.2 pg/mL    FREE T4 0.90 - 1.70 ng/dL 1.65     Component    Latest Ref Rng & Units 10/19/2018   Thyroid Peroxidase Antibody    <35 IU/mL 11   Thyroglobulin Antibody    <40 IU/mL <20   Thyroid Stim Immunog    <=1.3 TSI index <1.0     All pertinent notes, labs, and images personally reviewed by me.     A/P  Mr.Walter Morgan is a 62 year old evaluated via phone visit for the management of:    1. DM2 - Under good control. A1c 7.6%.  Diabetes is complicated by neuropathy and nephropathy/elevated urine microalbumin.  He is followed by nephrology.  Can't test his blood sugars using a meter due to the above and will benefit from use of continuous glucose monitor.  No recent blood sugar data  to review but reviewed juan data from March-April.  In general blood sugar appears on higher side.  Noted that blood sugars are higher during the day.  Plan:  Discussed diagnosis, pathophysiology, management and treatment options of condition with pt.  I also discussed importance of strict blood sugar control to prevent complications associated with uncontrolled diabetes.  Start using juan sensors.  Rx.  Increase Basaglar to 35 Units/day  Continue rest regimen.  Check if Jardiance is covered at [employer].  Follow up with CDE in 6 weeks.  Labs and follow up in 3-4 months.  Continue to monitor kidney function closely and adjust metformin dose accordingly.  Please make a lab appointment for blood work and follow up clinic appointment in 1 week after that to discuss results.    2. Hypothyroidism.  Currently treated with Levoxyl (KAMERON) 137 mcg/day.  Clinically and biochemically euthyroid.   1/2023 Labs in range.  Plan:  Discussed diagnosis, pathophysiology, management and treatment options of condition with pt.  Continue Levoxyl 137 mcg/day.   Labs in 1 year (1/2024 onwards) or sooner if concerns.    Discussed s/s of hypothyroidism and hyperthyroidism to watch for.  The patient indicates understanding of these issues and agrees with the plan.    3. Hyperlipidemia - On statin therapy. Managed by PCP. Not discussed.    4. Prevention:  Opthalmology-Yes: recommend annually  Dental-Yes:recommend twice a year  ASA-Yes, 81 mg qd   Smoking- No    Most Recent Immunizations   Administered Date(s) Administered     COVID-19 Bivalent 12+ (Pfizer) 10/15/2022     COVID-19 MONOVALENT 12+ (Pfizer) 10/15/2022     COVID-19 Monovalent Booster 18+ (Moderna) 04/12/2022     COVID-19 Vaccine (Hupu) 03/25/2021     DTaP, Unspecified 11/08/2010     Flu, Unspecified 11/15/2016     [id number] Novel Flu 11/04/2009     Hepatitis A (ADULT 19+) 04/17/2019     Influenza (H1N1) 11/04/2009     Influenza (IIV3) PF 10/01/2012     Influenza Vaccine  50-64 or 18-64 w/egg allergy (Flublok) 10/15/2022     Influenza Vaccine >6 months (Alfuria,Fluzone) 09/07/2017     Influenza Vaccine, 6+MO IM (QUADRIVALENT W/PRESERVATIVES) 11/15/2016     MMR 03/21/1995     Pneumococcal (PCV 7) 06/15/2022     Pneumococcal 20 valent Conjugate (Prevnar 20) 06/15/2022     Pneumococcal 23 valent 10/05/2012     TD,PF 7+ (Tenivac) 03/21/1995     TDAP (Adacel,Boostrix) 09/24/2021     TDAP Vaccine (Adacel) 11/08/2010     Typhoid IM 10/16/2018     Zoster recombinant adjuvanted (SHINGRIX) 12/03/2020     Follow-up:  3-4 months.    Cindi Meraz MD  [facility]  CC: Lisa Pierre      Answers for HPI/ROS submitted by the patient on 5/3/2023  General Symptoms: No  Skin Symptoms: No  HENT Symptoms: Yes  EYE SYMPTOMS: Yes  HEART SYMPTOMS: Yes  LUNG SYMPTOMS: No  INTESTINAL SYMPTOMS: No  URINARY SYMPTOMS: No  REPRODUCTIVE SYMPTOMS: No  SKELETAL SYMPTOMS: Yes  BLOOD SYMPTOMS: No  NERVOUS SYSTEM SYMPTOMS: Yes  MENTAL HEALTH SYMPTOMS: Yes  Ear pain: No  Ear discharge: No  Hearing loss: No  Tinnitus: Yes  Nosebleeds: No  Congestion: No  Sinus pain: No  Trouble swallowing: No   Voice hoarseness: No  Mouth sores: No  Sore throat: No  Tooth pain: No  Gum tenderness: No  Bleeding gums: Yes  Change in taste: No  Change in sense of smell: No  Dry mouth: No  Hearing aid used: No  Neck lump: No  Eye pain: No  Vision loss: No  Dry eyes: No  Watery eyes: No  Eye bulging: No  Double vision: No  Flashing of lights: No  Spots: No  Floaters: Yes  Redness: No  Crossed eyes: No  Tunnel Vision: No  Yellowing of eyes: No  Eye irritation: No  Chest pain or pressure: No  Fast or irregular heartbeat: No  Pain in legs with walking: No  Trouble breathing while lying down: No  Fingers or toes appear blue: No  High blood pressure: Yes  Low blood pressure: No  Fainting: No  Murmurs: No  Pacemaker: No  Varicose veins: No  Edema or swelling: No  Wake up at night with shortness of  breath: No  Light-headedness: Yes  Exercise intolerance: No  Back pain: No  Muscle aches: Yes  Neck pain: Yes  Swollen joints: No  Joint pain: No  Bone pain: No  Muscle cramps: No  Muscle weakness: No  Joint stiffness: No  Bone fracture: No  Trouble with coordination: Yes  Dizziness or trouble with balance: Yes  Fainting or black-out spells: No  Memory loss: Yes  Headache: Yes  Seizures: No  Speech problems: No  Tingling: No  Tremor: Yes  Weakness: No  Difficulty walking: Yes  Paralysis: No  Numbness: Yes  Nervous or Anxious: Yes  Depression: Yes  Trouble sleeping: Yes  Trouble thinking or concentrating: Yes  Mood changes: Yes  Panic attacks: No          Again, thank you for allowing me to participate in the care of your patient.        Sincerely,        Cindi Meraz MD     secondary to Pneumonia   nebuluizers PRN supplemental oxygen via [place]   Steroid dose to taper  Nebulizers as needed

## 2023-05-05 ENCOUNTER — HOSPITAL ENCOUNTER (OUTPATIENT)
Dept: PHYSICAL THERAPY | Facility: CLINIC | Age: 63
Setting detail: THERAPIES SERIES
Discharge: HOME | End: 2023-05-05
Attending: PHYSICIAN ASSISTANT
Payer: MEDICARE

## 2023-05-05 PROCEDURE — 97116 GAIT TRAINING THERAPY: CPT | Mod: GP | Performed by: PHYSICAL THERAPIST

## 2023-05-05 PROCEDURE — 97112 NEUROMUSCULAR REEDUCATION: CPT | Mod: GP | Performed by: PHYSICAL THERAPIST

## 2023-05-08 ENCOUNTER — THERAPY VISIT (OUTPATIENT)
Dept: OCCUPATIONAL THERAPY | Facility: CLINIC | Age: 63
End: 2023-05-08
Payer: MEDICARE

## 2023-05-08 DIAGNOSIS — M24.549 CONTRACTURE OF HAND: Primary | ICD-10-CM

## 2023-05-08 PROCEDURE — 97760 ORTHOTIC MGMT&TRAING 1ST ENC: CPT | Mod: GO

## 2023-05-12 ENCOUNTER — HOSPITAL ENCOUNTER (OUTPATIENT)
Dept: PHYSICAL THERAPY | Facility: CLINIC | Age: 63
Setting detail: THERAPIES SERIES
Discharge: HOME | End: 2023-05-12
Attending: PHYSICIAN ASSISTANT
Payer: MEDICARE

## 2023-05-12 PROCEDURE — 97116 GAIT TRAINING THERAPY: CPT | Mod: GP | Performed by: PHYSICAL THERAPIST

## 2023-05-12 PROCEDURE — 97112 NEUROMUSCULAR REEDUCATION: CPT | Mod: GP | Performed by: PHYSICAL THERAPIST

## 2023-05-16 ENCOUNTER — PATIENT OUTREACH (OUTPATIENT)
Dept: CARE COORDINATION | Facility: CLINIC | Age: 63
End: 2023-05-16
Payer: MEDICARE

## 2023-05-17 ENCOUNTER — PATIENT OUTREACH (OUTPATIENT)
Dept: FAMILY MEDICINE | Facility: CLINIC | Age: 63
End: 2023-05-17
Payer: MEDICARE

## 2023-05-17 ENCOUNTER — VIRTUAL VISIT (OUTPATIENT)
Dept: PSYCHOLOGY | Facility: CLINIC | Age: 63
End: 2023-05-17
Payer: MEDICARE

## 2023-05-17 ENCOUNTER — THERAPY VISIT (OUTPATIENT)
Dept: PHYSICAL THERAPY | Facility: CLINIC | Age: 63
End: 2023-05-17
Attending: PHYSICIAN ASSISTANT
Payer: MEDICARE

## 2023-05-17 ENCOUNTER — THERAPY VISIT (OUTPATIENT)
Dept: OCCUPATIONAL THERAPY | Facility: CLINIC | Age: 63
End: 2023-05-17
Attending: PHYSICIAN ASSISTANT
Payer: MEDICARE

## 2023-05-17 DIAGNOSIS — F33.1 MODERATE EPISODE OF RECURRENT MAJOR DEPRESSIVE DISORDER (H): Primary | ICD-10-CM

## 2023-05-17 DIAGNOSIS — Z78.9 DEFICIT IN ACTIVITIES OF DAILY LIVING (ADL): ICD-10-CM

## 2023-05-17 DIAGNOSIS — Z78.9 DEFICIT IN ACTIVITIES OF DAILY LIVING (ADL): Primary | ICD-10-CM

## 2023-05-17 PROCEDURE — 97116 GAIT TRAINING THERAPY: CPT | Mod: GP | Performed by: PHYSICAL THERAPIST

## 2023-05-17 PROCEDURE — 97535 SELF CARE MNGMENT TRAINING: CPT | Mod: GO

## 2023-05-17 PROCEDURE — 90837 PSYTX W PT 60 MINUTES: CPT | Mod: VID | Performed by: SOCIAL WORKER

## 2023-05-17 PROCEDURE — 97112 NEUROMUSCULAR REEDUCATION: CPT | Mod: GP | Performed by: PHYSICAL THERAPIST

## 2023-05-17 ASSESSMENT — PATIENT HEALTH QUESTIONNAIRE - PHQ9
SUM OF ALL RESPONSES TO PHQ QUESTIONS 1-9: 14
10. IF YOU CHECKED OFF ANY PROBLEMS, HOW DIFFICULT HAVE THESE PROBLEMS MADE IT FOR YOU TO DO YOUR WORK, TAKE CARE OF THINGS AT HOME, OR GET ALONG WITH OTHER PEOPLE: SOMEWHAT DIFFICULT
SUM OF ALL RESPONSES TO PHQ QUESTIONS 1-9: 14

## 2023-05-17 NOTE — PROGRESS NOTES
[facility]                                     Progress Note    Patient Name: Steve Morgan  Date: May 17, 2023         Service Type: Individual      Session Start Time: 11:02  Session End Time: 11:55     Session Length: 53    Session #: 2    Attendees: Client attended alone    Service Modality:  Video Visit:      Provider verified identity through the following two step process.  Patient provided:  Patient is known previously to provider    Telemedicine Visit: The patient's condition can be safely assessed and treated via synchronous audio and visual telemedicine encounter.      Reason for Telemedicine Visit: Patient has requested telehealth visit    Originating Site (Patient Location): Patient's home    Distant Site (Provider Location): [facility]    Consent:  The patient/guardian has verbally consented to: the potential risks and benefits of telemedicine (video visit) versus in person care; bill my insurance or make self-payment for services provided; and responsibility for payment of non-covered services.     Patient would like the video invitation sent by:  My Chart    Mode of Communication:  Video Conference via [employer]well    Distant Location (Provider):  On-site    As the provider I attest to compliance with applicable laws and regulations related to telemedicine.    DATA  Extended Session (53+ minutes): PROLONGED SERVICE IN THE OUTPATIENT SETTING REQUIRING DIRECT (FACE-TO-FACE) PATIENT CONTACT BEYOND THE USUAL SERVICE:    - Treatment protocol required additional time to complete, due to the nature of diagnosis being treated.  See Interventions section for details  Interactive Complexity: No  Crisis: No        Progress Since Last Session (Related to Symptoms / Goals / Homework):   Symptoms: Worsening increasing depression    Homework: have not seen client in 6 months      Episode of Care Goals: Achieved / completed to satisfaction -  PREPARATION (Decided to change - considering how); Intervened by negotiating a change plan and determining options / strategies for behavior change, identifying triggers, exploring social supports, and working towards setting a date to begin behavior change     Current / Ongoing Stressors and Concerns:   Struggles with motivation, getting along with wife and sister-in-law and being able to express and deal with emotions in healthy ways.     Treatment Objective(s) Addressed in This Session:   Decrease frequency and intensity of feeling down, depressed, hopeless  Create treatment plan     Intervention:   ACT: Began creating treatment plan and discussing what patient would like to see happen. Provided psychoeducation around emotions.  Began discussing the choice point.    Assessments completed prior to visit:  The following assessments were completed by patient for this visit:  PHQ2:       3/10/2023     1:53 PM 6/12/2022     4:29 PM 3/28/2022    10:47 AM 11/16/2021    10:14 AM 9/23/2021    10:32 AM 9/2/2020     9:26 AM 1/16/2020     9:05 AM   PHQ-2 ( 1999 [employer])   Q1: Little interest or pleasure in doing things  3 0 1 3 3 0   Q2: Feeling down, depressed or hopeless  1 0 1 1 3 0   PHQ-2 Score  4 0 2 4 6 0   PHQ-2 Total Score (12-17 Years)- Positive if 3 or more points; Administer PHQ-A if positive     4 6 0   Q1: Little interest or pleasure in doing things  Nearly every day  Several days Nearly every day Nearly every day    Q2: Feeling down, depressed or hopeless  Several days  Several days Several days Nearly every day    PHQ-2 Score Incomplete 4  2 4 6      PHQ9:       9/8/2022     6:54 AM 10/17/2022    10:24 AM 12/6/2022     3:05 PM 12/20/2022     9:41 AM 1/27/2023    10:02 AM 3/10/2023     1:47 PM 5/17/2023     7:48 AM   PHQ-9 SCORE   PHQ-9 Total Score MyChart 10 (Moderate depression)  9 (Mild depression) 6 (Mild depression) 6 (Mild depression) 10 (Moderate depression) 14 (Moderate depression)   PHQ-9 Total Score  10 16 9 6    6    6 6 10 14     PROMIS 10-Global Health (all questions and answers displayed):       10/27/2022    12:43 PM 5/15/2023    10:44 AM 5/21/2023     4:35 PM   PROMIS 10   In general, would you say your health is: Fair Fair Fair   In general, would you say your quality of life is: Good Good Very good   In general, how would you rate your physical health? Poor Fair Poor   In general, how would you rate your mental health, including your mood and your ability to think? Good Fair Fair   In general, how would you rate your satisfaction with your social activities and relationships? Good Fair Fair   In general, please rate how well you carry out your usual social activities and roles Fair Good Fair   To what extent are you able to carry out your everyday physical activities such as walking, climbing stairs, carrying groceries, or moving a chair? Mostly Moderately Mostly   In the past 7 days, how often have you been bothered by emotional problems such as feeling anxious, depressed, or irritable? Sometimes Often Often   In the past 7 days, how would you rate your fatigue on average? Moderate Moderate Moderate   In the past 7 days, how would you rate your pain on average, where 0 means no pain, and 10 means worst imaginable pain? 2 1 0   In general, would you say your health is: 2 2 2   In general, would you say your quality of life is: 3 3 4   In general, how would you rate your physical health? 1 2 1   In general, how would you rate your mental health, including your mood and your ability to think? 3 2 2   In general, how would you rate your satisfaction with your social activities and relationships? 3 2 2   In general, please rate how well you carry out your usual social activities and roles. (This includes activities at home, at work and in your community, and responsibilities as a parent, child, spouse, employee, friend, etc.) 2 3 2   To what extent are you able to carry out your everyday physical activities  such as walking, climbing stairs, carrying groceries, or moving a chair? 4 3 4   In the past 7 days, how often have you been bothered by emotional problems such as feeling anxious, depressed, or irritable? 3 4 4   In the past 7 days, how would you rate your fatigue on average? 3 3 3   In the past 7 days, how would you rate your pain on average, where 0 means no pain, and 10 means worst imaginable pain? 2 1 0   Global Mental Health Score 12 9 10   Global Physical Health Score 12 12 13   PROMIS TOTAL - SUBSCORES 24 21 23         ASSESSMENT: Current Emotional / Mental Status (status of significant symptoms):   Risk status (Self / Other harm or suicidal ideation)   Patient denies current fears or concerns for personal safety.   Patient denies current or recent suicidal ideation or behaviors.   Patient denies current or recent homicidal ideation or behaviors.   Patient denies current or recent self injurious behavior or ideation.   Patient denies other safety concerns.   Patient reports there has been no change in risk factors since their last session.     Patient reports there has been no change in protective factors since their last session.     Recommended that patient call 911 or go to the local ED should there be a change in any of these risk factors.     Appearance:   Appropriate    Eye Contact:   Good    Psychomotor Behavior: Normal    Attitude:   Cooperative  Interested   Orientation:   All   Speech    Rate / Production: Normal/ Responsive    Volume:  Normal    Mood:    Anhedonia   Affect:    Subdued    Thought Content:  Clear    Thought Form:  Coherent  Logical    Insight:    Good      Medication Review:   Changes to psychiatric medications, see updated Medication List in EPIC.      Medication Compliance:   medications stopped while in hospital and is meeting with psychiatry next week     Changes in Health Issues:   Yes: Diabetes, Associated Psychological Distress     Chemical Use Review:   Substance Use:  Chemical use reviewed, no active concerns identified      Tobacco Use: No current tobacco use.      Diagnosis:  1. Moderate episode of recurrent major depressive disorder (H)        Collateral Reports Completed:   Not Applicable    PLAN: (Patient Tasks / Therapist Tasks / Other)  Think about what it means to have more motivation        SAURABH Domínguez                                                         ______________________________________________________________________    Individual Treatment Plan    Patient's Name: Steve Morgan  YOB: 1960    Date of Creation: May 17, 2023  Date Treatment Plan Last Reviewed/Revised: May 17, 2023    DSM5 Diagnoses: 296.35 (F33.41)  Major Depressive Disorder, Recurrent Episode, In partial remission With melancholic features  Psychosocial / Contextual Factors: Family Factors Patient struggles with getting along with sister-in-law and Societal Factors Patient notes patriachial values that influence his thoughts around emotions.  PROMIS (reviewed every 90 days): PROMIS-10 Scores        10/27/2022    12:43 PM 5/15/2023    10:44 AM   PROMIS-10 Total Score w/o Sub Scores   PROMIS TOTAL - SUBSCORES 24 21       Referral / Collaboration:  Referral to another professional/service is not indicated at this time..    Anticipated number of session for this episode of care: 9-12 sessions  Anticipation frequency of session: Every other week  Anticipated Duration of each session: 38-52 minutes  Treatment plan will be reviewed in 90 days or when goals have been changed.       MeasurableTreatment Goal(s) related to diagnosis / functional impairment(s)  Goal 1: Patient will work to increase motivation    I will know I've met my goal when will review with patient.      Objective #A (Patient Action)    Patient will Decrease frequency and intensity of feeling down, depressed, hopeless.  Status: New - Date: 5/17/23     Intervention(s)  Therapist will teach  grounding.    Objective #B  Patient will Increase interest, engagement, and pleasure in doing things.  Status: New - Date: 5/17/23     Intervention(s)  Therapist will assign homework to engage in valued action  provide educational materials on values  teach valued action.    Objective #C  Patient will Improve concentration, focus, and mindfulness in daily activities .  Status: New - Date: 5/17/23     Intervention(s)  Therapist will assign homework to engage in mindfulness of emotion and thought defusion  provide educational materials on on mindfulness and thoughts  teach thought defusion and acceptance of emotion.      Patient has reviewed and agreed to the above plan.      Veronica Chavis, [facility]  May 17, 2023

## 2023-05-17 NOTE — TELEPHONE ENCOUNTER
Spoke to patient to inform of recommendation. Scheduled patient for wellness visit on 6/30/23.    Patient Quality Outreach Health Maintenance - PAL RN    Summary:    PAL RN contacted pt regarding overdue health maintenance    Patient is due/failing the following:   Diabetes -  Eye Exam  Physical Annual Wellness Visit      Topic Date Due     COVID-19 Vaccine (5 - Additional dose for Vi series) 02/28/2023       Health Maintenance Due   Topic Date Due     URINE DRUG SCREEN  Never done     AORTIC ANEURYSM SCREENING (SYSTEM ASSIGNED)  Never done     COVID-19 Vaccine (5 - Additional dose for Vi series) 02/28/2023     EYE EXAM  05/17/2023     MEDICARE ANNUAL WELLNESS VISIT  06/15/2023     ANNUAL REVIEW OF HM ORDERS  06/15/2023       Type of outreach:    Phone, spoke to patient/parent. Scheduled for annual wellness visit    - Advised patient if any questions or concerns comes up to call the PAL RN.   - Patient given opportunity to ask questions and at this time there is nothing further needed.     Questions for provider review:    None                                                                                    Yudith GARCIA RN, BSN, PHN - PAL (patient advocate liaison)  [facility]  [phone]

## 2023-05-17 NOTE — TELEPHONE ENCOUNTER
Lisa Pierre, ANASTASIA    Please advise on recommended patient follow-up with PCP? Okay to cancel virtual and schedule patient for MWV in June ?     -Patient calls to inform he is scheduled for virtual visit on Friday 5/19/23 and is wondering if PCP feels this appointment is needed.     Virtual visit 3/10/23- no follow-up recommended in visit  Office visit 1/27/23- patient advised to follow-up around 3/27/23 for chronic disease.     RN informed patient he was coming up due for Medicare annual wellness visit and order review in June but this would need to be in person visit.     Yudith GARCIA RN, BSN, PHN  [facility]  [phone]

## 2023-05-19 ENCOUNTER — THERAPY VISIT (OUTPATIENT)
Dept: PHYSICAL THERAPY | Facility: CLINIC | Age: 63
End: 2023-05-19
Attending: PHYSICIAN ASSISTANT
Payer: MEDICARE

## 2023-05-19 DIAGNOSIS — Z78.9 DEFICIT IN ACTIVITIES OF DAILY LIVING (ADL): Primary | ICD-10-CM

## 2023-05-19 PROCEDURE — 97112 NEUROMUSCULAR REEDUCATION: CPT | Mod: GP | Performed by: PHYSICAL THERAPIST

## 2023-05-19 PROCEDURE — 97116 GAIT TRAINING THERAPY: CPT | Mod: GP | Performed by: PHYSICAL THERAPIST

## 2023-05-22 ENCOUNTER — THERAPY VISIT (OUTPATIENT)
Dept: OCCUPATIONAL THERAPY | Facility: CLINIC | Age: 63
End: 2023-05-22
Payer: MEDICARE

## 2023-05-22 DIAGNOSIS — M24.549 CONTRACTURE OF HAND: Primary | ICD-10-CM

## 2023-05-22 PROCEDURE — 97140 MANUAL THERAPY 1/> REGIONS: CPT | Mod: GO | Performed by: OCCUPATIONAL THERAPIST

## 2023-05-22 PROCEDURE — 97760 ORTHOTIC MGMT&TRAING 1ST ENC: CPT | Mod: GO | Performed by: OCCUPATIONAL THERAPIST

## 2023-05-24 ENCOUNTER — THERAPY VISIT (OUTPATIENT)
Dept: PHYSICAL THERAPY | Facility: CLINIC | Age: 63
End: 2023-05-24
Attending: PHYSICIAN ASSISTANT
Payer: MEDICARE

## 2023-05-24 ENCOUNTER — THERAPY VISIT (OUTPATIENT)
Dept: OCCUPATIONAL THERAPY | Facility: CLINIC | Age: 63
End: 2023-05-24
Attending: PHYSICIAN ASSISTANT
Payer: MEDICARE

## 2023-05-24 DIAGNOSIS — M62.81 GENERALIZED MUSCLE WEAKNESS: ICD-10-CM

## 2023-05-24 DIAGNOSIS — M24.549 CONTRACTURE OF HAND: Primary | ICD-10-CM

## 2023-05-24 DIAGNOSIS — Z78.9 DEFICIT IN ACTIVITIES OF DAILY LIVING (ADL): Primary | ICD-10-CM

## 2023-05-24 DIAGNOSIS — Z78.9 DEFICIT IN ACTIVITIES OF DAILY LIVING (ADL): ICD-10-CM

## 2023-05-24 PROCEDURE — 97535 SELF CARE MNGMENT TRAINING: CPT | Mod: GO

## 2023-05-24 PROCEDURE — 97110 THERAPEUTIC EXERCISES: CPT | Mod: GP | Performed by: PHYSICAL THERAPIST

## 2023-05-24 PROCEDURE — 97116 GAIT TRAINING THERAPY: CPT | Mod: GP | Performed by: PHYSICAL THERAPIST

## 2023-05-25 ENCOUNTER — TELEPHONE (OUTPATIENT)
Dept: FAMILY MEDICINE | Facility: CLINIC | Age: 63
End: 2023-05-25
Payer: MEDICARE

## 2023-05-25 ENCOUNTER — VIRTUAL VISIT (OUTPATIENT)
Dept: PSYCHIATRY | Facility: CLINIC | Age: 63
End: 2023-05-25
Payer: MEDICARE

## 2023-05-25 DIAGNOSIS — Z86.39 HISTORY OF SIADH: ICD-10-CM

## 2023-05-25 DIAGNOSIS — R06.09 DOE (DYSPNEA ON EXERTION): Primary | ICD-10-CM

## 2023-05-25 DIAGNOSIS — F33.0 MILD EPISODE OF RECURRENT MAJOR DEPRESSIVE DISORDER (H): ICD-10-CM

## 2023-05-25 DIAGNOSIS — E87.1 HYPONATREMIA: ICD-10-CM

## 2023-05-25 DIAGNOSIS — F33.1 MAJOR DEPRESSIVE DISORDER, RECURRENT EPISODE, MODERATE (H): Primary | ICD-10-CM

## 2023-05-25 PROCEDURE — 99205 OFFICE O/P NEW HI 60 MIN: CPT | Mod: VID | Performed by: PSYCHIATRY & NEUROLOGY

## 2023-05-25 RX ORDER — MIRTAZAPINE 15 MG/1
TABLET, FILM COATED ORAL
Qty: 30 TABLET | Refills: 1 | Status: SHIPPED | OUTPATIENT
Start: 2023-05-25 | End: 2023-07-24

## 2023-05-25 ASSESSMENT — PATIENT HEALTH QUESTIONNAIRE - PHQ9
10. IF YOU CHECKED OFF ANY PROBLEMS, HOW DIFFICULT HAVE THESE PROBLEMS MADE IT FOR YOU TO DO YOUR WORK, TAKE CARE OF THINGS AT HOME, OR GET ALONG WITH OTHER PEOPLE: SOMEWHAT DIFFICULT
SUM OF ALL RESPONSES TO PHQ QUESTIONS 1-9: 16
SUM OF ALL RESPONSES TO PHQ QUESTIONS 1-9: 16

## 2023-05-25 NOTE — Clinical Note
Team,  Thank you for your consult and care of the patient. Depression is considerable with negative impact. Suspect citalopram was contributing to hyponatremia. I have started a mirtazapine titration as this has a lower incidence of hyponatremia (though with limited data). Mirtazapine would not need doseage adjustment per current eGFR. I have referred to MTM for consult with psychotropics and hyponatremia as well. Please let me know if you have any questions or concerns.   Sincerely, Anant Pinto M.D. Consultative Psychiatrist Program Medical Director, Lead Collaborative Care Psychiatry Service

## 2023-05-25 NOTE — TELEPHONE ENCOUNTER
Anant Pinto MD James, Courtney N, ANASTASIA Gonzalez,     I had a specific concern from my review of systems to convey to you. He reported a 3 week history of getting more winded easily and brief mild chest pain.     Sincerely,   Anant Pinto M.D.   Consultative Psychiatrist   Program Medical Director, Lead   Collaborative [facility] Psychiatry Service

## 2023-05-25 NOTE — Clinical Note
Lisa,  I had a specific concern from my review of systems to convey to you. He reported a 3 week history of getting more winded easily and brief mild chest pain.  Sincerely, Anant Pinto M.D. Consultative Psychiatrist Program Medical Director, Lead Collaborative Care Psychiatry Service

## 2023-05-25 NOTE — PATIENT INSTRUCTIONS
"Patient Education   Collaborative Care Psychiatry Service  What to Expect  Here's what to expect from your Collaborative Care Psychiatry Service (CCPS).   About CCPS  CCPS means 2 people work together to help you get better. You'll meet with a behavioral health clinician and a psychiatric doctor. A behavioral health clinician helps people with mental health problems by talking with them. A psychiatric doctor helps people by giving them medicine.  How it works  At every visit, you'll see the behavioral health clinician (BHC) first. They'll talk with you about how you're doing and teach you how to feel better.   Then you'll see the psychiatric doctor. This doctor can help you deal with troubling thoughts and feelings by giving you medicine. They'll make sure you know the plan for your care.   CCPS usually takes 3 to 6 visits. If you need more visits, we may have you start seeing a different psychiatric doctor for ongoing care.  If you have any questions or concerns, we'll be glad to talk with you.  About visits  Be open  At your visits, please talk openly about your problems. It may feel hard, but it's the best way for us to help you.  Cancelling visits  If you can't come to your visit, please call us right away at [phone]. If you don't cancel at least 24 hours (1 full day) before your visit, that's \"late cancellation.\"  Being late to visits  Being very late is the same as not showing up. You will be a \"no show\" if:  Your appointment starts with a BHC, and you're more than 15 minutes late for a 30-minute (half hour) visit. This will also cancel your appointment with the psychiatric doctor.  Your appointment is with a psychiatric doctor only, and you're more than 15 minutes late for a 30-minute (half hour) visit.  Your appointment is with a psychiatric doctor only, and you're more than 30 minutes late for a 60-minute (full hour) visit.  If you cancel late or don't show up 2 times within 6 months, we may end your " care.   Getting help between visits  If you need help between visits, you can call us Monday to Friday from 8 a.m. to 4:30 p.m. at [phone].  Emergency care  Call 911 or go to the nearest emergency department if your life or someone else's life is in danger.  Call 988 anytime to reach the national Suicide and Crisis hotline.  Medicine refills  To refill your medicine, call your pharmacy. You can also call [facility]'s Behavioral Access at [phone], Monday to Friday, 8 a.m. to 4:30 p.m. It can take 1 to 3 business days to get a refill.   Forms, letters, and tests  You may have papers to fill out, like FMLA, short-term disability, and workability. We can help you with these forms at your visits, but you must have an appointment. You may need more than 1 visit for this, to be in an intensive therapy program, or both.  Before we can give you medicine for ADHD, we may refer you to get tested for it or confirm it another way.  We may not be able to give you an emotional support animal letter.  We don't do mental health checks ordered by the court.   We don't do mental health testing, but we can refer you to get tested.   Thank you for choosing us for your care.  For informational purposes only. Not to replace the advice of your health care provider. Copyright   2022 [facility]. All rights reserved. [employer] [id number] - 12/22.

## 2023-05-25 NOTE — PROGRESS NOTES
Virtual Visit Details    Type of service:  Video Visit   Video Start Time: 1:46 PM  Video End Time:2:31 PM    Originating Location (pt. Location): Home  Distant Location (provider location):  Off-site  Platform used for Video Visit: Merged with [facility] Intake      IDENTIFICATION   Name: Steve Morgan   : 1960/63 year old      Sex:    @ male          Telemedicine Visit: The patient's condition can be safely assessed and treated via synchronous audio and visual telemedicine encounter.      Face to Face/patient Contact total time: 45 minutes  Pre Charting time: 2 minutes; Post charting time, communication and other activities: 14 minutes; Total time 61 minutes  1:34 PM    CHIEF COMPLAINT   Source of Referral:    Primary Care Provider:   Lisa Pierre     Consult for depression, SIADH      HISTORY OF PRESENT ILLNESS   Hospitalized for two weeks then in transition care x 2 weeks. Found low sodium and high K+. In this context psychotropics (citalopram and bupropion) were discontinued. He had a difficult time with mood and depression and on seeing PCP bupropion was restarted. However sx have not improved significantly.     When he gets upset about something there is a strong intensity to it and it includes shouting. He is not hurting anyone but reports aggressive shouting and not knowing how to settle down. Recognizes he says mean things and it ruining relationships. Poor focus. Lacking motivation. Sleeps excessively.         Psychiatric Review of Systems:  Anxiety not so much a challenge.    PHQ-9 scores:     3/10/2023     1:47 PM 2023     7:48 AM 2023     1:19 PM   PHQ-9 SCORE   PHQ-9 Total Score MyChart 10 (Moderate depression) 14 (Moderate depression) 16 (Moderately severe depression)   PHQ-9 Total Score 10 14 16     JOSE MANUEL-7 scores:        2022     9:43 AM 2023    10:03 AM 3/10/2023     1:48 PM   JOSE MANUEL-7 SCORE   Total Score 2 (minimal anxiety) 2 (minimal anxiety) 5  "(mild anxiety)   Total Score 2 2 5         Vital Signs:   There were no vitals taken for this visit.      11/17/2020    10:08 AM 10/4/2022     1:20 PM 12/20/2022     1:49 PM   Vitals - Patient Reported   Height (Patient Reported) 5' 6\" 5' 5\" 5' 5\"   Weight (Patient Reported) 242 lb 250 lb 250 lb   BMI (Based on Pt Reported Ht/Wt) 39.06 kg/m2 41.6 kg/m2 41.6 kg/m2                  REVIEW OF SYSTEMS:   Constitutional: None   Skin: pruritis at night time until point of seeing scratches on himself x 6 months - he believes from dry skin  Eyes: glasses, hx glaucoma (treated)  Ears/Nose/Throat: negative  Respiratory: reports getting winded (he is going to ask PCP) with two flights of stairs and slight chest pain and no history of asthma  Cardiovascular: as above with getting winded mild chest pain which resolves with rest - x 3 weeks - message sent to care team  Gastrointestinal: dry heaves history - has been evaluated  Genitourinary: working with nephrologist on salt/potassium intake, with prostate hard to go to bathroom  Musculoskeletal: negative  Neurologic: neuropathy, headaches. No feeling in hands or feet; got numbness to point of being unable to use a pen - leading to disability  Seizures or Head Injury: No  Hematologic/Lymphatic/Immunologic: negative  Endocrine: SIADH; reports he was previously drinking 2L water      PAST PSYCHIATRIC HISTORY:   Reports with depression in past he had similar sx as to currently with anger/mood - but not as intense  Reports to some extent depression through his life.   May have had anxiety around 2015 with work and deadlines; does report he was prone to nervousness  No ER visits or hospitalizations  Med Trials:  Citalopram from 2015  Bupropion from 2014  Self-Directed Violence: None      PAST MEDICAL HISTORY:     Past Medical History:   Diagnosis Date     Cellulitis and abscess of trunk 6/27/2017     Depression      Hyperlipidemia LDL goal <100 10/31/2010     Hypertension goal BP " (blood pressure) < 140/90 3/17/2011     Hypothyroidism 1/12/2010     Morbid obesity due to excess calories (H) 1/12/2010     Neuropathy in diabetes (H) 1/12/2010     Obesity 1/12/2010     Sleep apnea 1/12/2010    CPAP     Type 2 diabetes, HbA1C goal < 8% (H) 3/8/2011      has a past medical history of Cellulitis and abscess of trunk (6/27/2017), Depression, Hyperlipidemia LDL goal <100 (10/31/2010), Hypertension goal BP (blood pressure) < 140/90 (3/17/2011), Hypothyroidism (1/12/2010), Morbid obesity due to excess calories (H) (1/12/2010), Neuropathy in diabetes (H) (1/12/2010), Obesity (1/12/2010), Sleep apnea (1/12/2010), and Type 2 diabetes, HbA1C goal < 8% (H) (3/8/2011).    He has no past medical history of Arthritis, Cancer (H), Cerebral infarction (H), Chronic infection, Congestive heart failure (H), COPD (chronic obstructive pulmonary disease) (H), Depressive disorder, Heart disease, History of blood transfusion, Malignant hyperthermia, or Uncomplicated asthma.    Current medications include:   Current Outpatient Medications   Medication Sig     acetaminophen (TYLENOL) 325 MG tablet Take 2 tablets (650 mg) by mouth every 4 hours as needed for pain or fever     ASPIRIN 81 MG OR TABS Take 81 mg by mouth every evening     atenolol (TENORMIN) 25 MG tablet Take 1 tablet (25 mg) by mouth daily     buPROPion (WELLBUTRIN) 75 MG tablet Take 2 tablets (150 mg) by mouth 2 times daily     Calcium Carb-Cholecalciferol (CALCIUM 600 + D PO) Take 1 tablet by mouth daily     Continuous Blood Gluc  (FREESTYLE GIULIANA 2 READER) SIENA 1 Device continuous     Continuous Blood Gluc Sensor (FREESTYLE GIULIANA 2 SENSOR) MISC 1 each every 14 days Use 1 sensor every 14 days. Use to read blood sugars per 's instructions.     Cyanocobalamin (VITAMIN B 12 PO) Take 250 mcg by mouth daily     ferrous sulfate 325 (65 FE) MG tablet Take 1 tablet by mouth daily (with breakfast).     finasteride (PROSCAR) 5 MG tablet Take 1  tablet (5 mg) by mouth daily     fluticasone (FLONASE) 50 MCG/ACT nasal spray SPRAY 1 SPRAY INTO BOTH NOSTRILS 2 TIMES DAILY     glipiZIDE (GLUCOTROL XL) 10 MG 24 hr tablet Take 1 tablet (10 mg) by mouth daily     ibuprofen (ADVIL/MOTRIN) 200 MG tablet Take 400 mg by mouth every 6 hours as needed for pain     insulin glargine (BASAGLAR KWIKPEN) 100 UNIT/ML pen Inject 35 Units Subcutaneous daily     LANTUS SOLOSTAR 100 UNIT/ML soln Inject 25 Units Subcutaneous At Bedtime     latanoprost (XALATAN) 0.005 % ophthalmic solution INSTILL 1 DROP INTO BOTH EYES IN THE EVENING     LEVOXYL 137 MCG tablet Take 1 tablet (137 mcg) by mouth daily Dispense name brand Levoxyl only, no generics     losartan (COZAAR) 100 MG tablet Take 1 tablet (100 mg) by mouth daily     metFORMIN (GLUCOPHAGE) 1000 MG tablet Take 1 tablet (1,000 mg) by mouth 2 times daily (with meals)     MULTI-VITAMIN OR TABS Take 1 tablet by mouth daily     ondansetron (ZOFRAN ODT) 4 MG ODT tab Take 4 mg by mouth At Bedtime     pantoprazole (PROTONIX) 40 MG EC tablet Take 40 mg by mouth daily as needed for heartburn     polyethylene glycol (MIRALAX) 17 GM/Dose powder Take 17 g by mouth daily as needed for constipation     senna-docusate (SENOKOT-S/PERICOLACE) 8.6-50 MG tablet Take 1-2 tablets by mouth 2 times daily as needed for constipation     simvastatin (ZOCOR) 20 MG tablet Take 1 tablet (20 mg) by mouth At Bedtime     Urea (URE-NA) 15 g PACK packet Take 15 g by mouth daily     VITAMIN D, CHOLECALCIFEROL, PO Take 1,000 Units by mouth daily.     No current facility-administered medications for this visit.         FAMILY HISTORY:   Two brothers with anger issues; one of the two with schizophrenia    SOCIAL HISTORY:   Wife works full time, two days a week at home. Disabled since 2015. Associate's degree.  with two kids. Worked as . Hx of abuse from father.     Substance Use History:  Alcohol: no hx psychosocial difficulty.   Nicotine:  Nicotine - 3-4 months use at the age of 16  Recreational substances: None    MENTAL STATUS EXAMINATION:   Appearance: intact attention to grooming and hygiene  Attitude:  cooperative   Eye Contact:  good  Gait and Station: sitting  Psychomotor Behavior:  reduced  Oriented to:  Grossly person place and time  Attention Span and Concentration:  Grossly intact  Speech:  Depressed tone  Language: english  Mood:  sad   Affect:  constricted  Associations:  no loose associations  Thought Process:  logical, linear and goal oriented  Thought Content:  No evidence of delusions or suicidal or homicidal ideation plan or intent  Memory: grossly intact  Fund of Knowledge: Good  Insight:  good  Judgment:  intact, adequate for safety  Impulse Control:  intact        DIAGNOSES:   Major Depressive Disorder, moderate, recurrent  SIADH/hyponatremia      ASSESSMENT:   Patient with history of depression, and considerable mood difficulties without prior combo of citalopram/bupropion affecting relationships negatively. Has comorbid diagnoses including SIADH/hyponatermia (possibly worse with selective serotonin reuptake inhibitor) historically, DM, obstructive sleep apnea, b12 deficiency.     Per literature review mirtazapine is less likely to cause hyponatremia, and for current eGFR no dosage necessary. Will pursue.    Today Steve Morgan reports no suicidal ideations. In addition, he has notable risk factors for self-harm, including age and comorbid medical conditions. However, risk is mitigated by commitment to family, [employer] beliefs and absence of past attempts. Therefore, based on all available evidence including the factors cited above, he does not appear to be at imminent risk for self-harm, does not meet criteria for a 72-hr hold, and therefore remains appropriate for ongoing outpatient level of care.       PLAN:       Patient advised of consultative model. Patient will continue to be seen for ongoing consultation and  stabilization.    Does not meet criteria for involuntary treatment or hospitalization    Add mirtazapine 5/25/23 7.5 mg po at bedtime x 3 nights then 15 mg po at bedtime -Risks, benefits and alternatives discussed.  Patient provides verbal consent to treatment.    Continue bupropion 150 mg po bid - provides verbal consent to treatment. Consider tapering with seeming lack of efficacy    DC'd citalopram (hyponatremia/SIADH)    Labs - reviewed    Referred for MTM referral, priority on guidance for psychotropic selection and risk of hyponatremia    Communicated with care team about mirtazapine    Return in pending subsequent creatinine, a1c - order bmp      Administrative Billing:   Time spent with patient was greater than 50% of time and/or significant time was spent in counseling and coordination of care regarding above diagnoses and treatment plan. Pre charting time and post charting time/documentation/coordination are done on date of service.     Signed:   Anant Pinto M.D.  [facility]    Disclaimer: This note consists of symbols derived from keyboarding, dictation and/or voice recognition software. As a result, there may be errors in the script that have gone undetected. Please consider this when interpreting information found in this chart.

## 2023-05-25 NOTE — NURSING NOTE
Is the patient currently in the state of [place]? YES    Visit mode:VIDEO    If the visit is dropped, the patient can be reconnected by: VIDEO VISIT: Text to cell phone:   Telephone Information:   Mobile [phone]       Will anyone else be joining the visit? No  (If patient encounters technical issues they should call [phone])    How would you like to obtain your AVS? MyChart    Are changes needed to the allergy or medication list? NO    Rooming Documentation: Assigned questionnaire(s) completed .    Reason for visit: Consult     Tatyana Edwards MILDRED        Care team has reviewed attendance agreement with patient. Patient advised that two failed appointments within 6 months may lead to termination of current episode of care.

## 2023-05-26 NOTE — TELEPHONE ENCOUNTER
Spoke to patient and informed of provider recommendation. Patient agreeable. Reports he had stress test done while back. Advised patient provider recommends due to new onset of reported symptoms. Informed patient [place] will contact to schedule. Patient requests number to schedule sent to [employer]. [employer] message sent.     Patient confirmed he is taking pantoprazole as prescribed.     Yudith GARCIA RN, BSN, PHN - PAL (patient advocate liaison)  [facility]  [phone]

## 2023-05-26 NOTE — TELEPHONE ENCOUNTER
"Spoke to patient who reports intermittent symptoms, \"just once in awhile\". Patient planned to discuss at upcoming visit on 6/30/23.    Patient reports winded symptoms & chest pain occur when he is more active, doing yard work, walking flights of stairs throughout home.  When it occurs feels he needs to take a break. Symptoms dissipate after 5 minutes of rest. Denies symptoms at rest. Denies symptoms currently.  Reports during episodes pain at bottom of breastbone, located in the middle of chest. Resting alleviates symptoms.   -Patient reports during episodes he \"gets sick\", consists of dry heaving, headaches accompany dry heaving at times.   -Patient denies nausea prior to dry heaving, denies swelling  -When asked of numbness/tingling, patient reports neuropathy and \"has no feeling in legs or hands\".    Patient notes has discussed dry heaving previously with PCP and reports discussed a rip or tear in stomach that releases \"juices\" and causes dry heaving.   -In addition, patient has recently noticed drooling while seated and working on the computer- he'll be sitting down and notice his cheek gets wet. Notes it occurs mostly on the left side .    Yudith GARCIA RN, BSN, PHN  [facility]  [phone]    "

## 2023-05-26 NOTE — TELEPHONE ENCOUNTER
Lexiscan test ordered. [facility] will call pt to set this up. This will check his heart.     Confirm with patient that he is taking pantoprazole for stomach. This will help with the other symptoms he's having.

## 2023-05-31 ENCOUNTER — THERAPY VISIT (OUTPATIENT)
Dept: OCCUPATIONAL THERAPY | Facility: CLINIC | Age: 63
End: 2023-05-31
Attending: PHYSICIAN ASSISTANT
Payer: MEDICARE

## 2023-05-31 ENCOUNTER — THERAPY VISIT (OUTPATIENT)
Dept: PHYSICAL THERAPY | Facility: CLINIC | Age: 63
End: 2023-05-31
Attending: PHYSICIAN ASSISTANT
Payer: MEDICARE

## 2023-05-31 DIAGNOSIS — M24.549 CONTRACTURE OF HAND: ICD-10-CM

## 2023-05-31 DIAGNOSIS — G62.9 PERIPHERAL NEUROPATHY: Primary | ICD-10-CM

## 2023-05-31 DIAGNOSIS — Z78.9 DEFICIT IN ACTIVITIES OF DAILY LIVING (ADL): Primary | ICD-10-CM

## 2023-05-31 PROCEDURE — 97535 SELF CARE MNGMENT TRAINING: CPT | Mod: GO

## 2023-05-31 PROCEDURE — 97112 NEUROMUSCULAR REEDUCATION: CPT | Mod: GP | Performed by: PHYSICAL THERAPIST

## 2023-05-31 PROCEDURE — 97116 GAIT TRAINING THERAPY: CPT | Mod: GP | Performed by: PHYSICAL THERAPIST

## 2023-06-01 ENCOUNTER — PATIENT OUTREACH (OUTPATIENT)
Dept: FAMILY MEDICINE | Facility: CLINIC | Age: 63
End: 2023-06-01

## 2023-06-01 ENCOUNTER — VIRTUAL VISIT (OUTPATIENT)
Dept: PHARMACY | Facility: CLINIC | Age: 63
End: 2023-06-01
Attending: PSYCHIATRY & NEUROLOGY
Payer: COMMERCIAL

## 2023-06-01 DIAGNOSIS — Z78.9 TAKES DIETARY SUPPLEMENTS: ICD-10-CM

## 2023-06-01 DIAGNOSIS — K21.9 GASTROESOPHAGEAL REFLUX DISEASE WITHOUT ESOPHAGITIS: ICD-10-CM

## 2023-06-01 DIAGNOSIS — E11.40 TYPE 2 DIABETES MELLITUS WITH DIABETIC NEUROPATHY (H): ICD-10-CM

## 2023-06-01 DIAGNOSIS — E03.9 HYPOTHYROIDISM: ICD-10-CM

## 2023-06-01 DIAGNOSIS — I10 HTN (HYPERTENSION): ICD-10-CM

## 2023-06-01 DIAGNOSIS — F33.9 MDD (MAJOR DEPRESSIVE DISORDER), RECURRENT EPISODE (H): Primary | ICD-10-CM

## 2023-06-01 DIAGNOSIS — E78.5 DYSLIPIDEMIA: ICD-10-CM

## 2023-06-01 PROCEDURE — 99207 PR NO CHARGE LOS: CPT | Mod: VID | Performed by: PHARMACIST

## 2023-06-01 NOTE — TELEPHONE ENCOUNTER
Lisa Pierre PA-C      Patient scheduled for Lexiscan Stress test on 6/5/2023     He is very concerned that he will not do well as he is diabetic and has been advised NPO 3 hours prior to start of test, has multiple steps to test and would not be able to eat from 8:45 AM to 3:15 or after gets very shaky if he is low on blood sugar     Instructions on Lexiscan ask for provider direction on medications, please advise on insulin use - does not appear patient is on a betablocker     Morning of the test:  Do not take Nitrates [eg. isosorbide dinitrate (Isordil), isosorbide mononitrate (Imdur, BiDil), nitroglycerin (Nitrostat, Nitromist, Nitro-Bid)] or wear a Nitro Patch (Transderm-Nitro).  If you take insulin or a beta blocker, follow the instructions from your health care provider    Thank you,   Marcela Blackburn, Registered Nurse, PAL (Patient Advocate Liaison)   [facility]   [phone]

## 2023-06-01 NOTE — Clinical Note
Hello,   Thank you for the referral! I agree with your plan regarding mirtazapine - it and wellbutrin lower risk for siadh compared to others. He just started remeron and I am happy to check in with him in a few weeks to see how it is going.   Thank you!  Haylie Grullon, PharmD, BCPP Medication Therapy Management Pharmacist [facility]

## 2023-06-01 NOTE — TELEPHONE ENCOUNTER
RN reviewed message below with patient, also sent via my chart for future reference     No further questions at this time, will return call if any arise     Marcela Blackburn Registered Nurse, PAL (Patient Advocate Liaison)   [facility]   [phone]

## 2023-06-01 NOTE — PROGRESS NOTES
Medication Therapy Management (MTM) Encounter    ASSESSMENT:                            Medication Adherence/Access: No issues identified    Depression: Agree with planned mirtazapine therapy.  Patient has only taken mirtazapine x1 dose.  Discussed that sedation may improve with time and sedation is actually less likely with higher doses of mirtazapine, so it may also improve when he increases to 15mg nightly.  Also discussed potential side effect of increased appetite and weight gain.  From an SIADH/hyponatremia standpoint, mirtazapine and Wellbutrin are considered low/unlikely risk compared SSRIs/SNRIs, although there are post-marketing case reports with both. Patient expressed understanding.  Future consideration could include increasing Wellbutrin to 450 mg if he is tolerating it well.  If not, could consider discontinuing due to minimal efficacy.  Discussed that nausea is notall that common with Wellbutrin, metformin is more likely to cause nausea we could consider switching to XR formulation in the future if needed.    Type 2 Diabetes: Patient is meeting A1c goal of < 8%. Future consideration - change metformin to XR d/t nausea.     Hyperlipidemia: Stable.    Hypertension: Stable.    Hypothyroidism: Stable. Last TSH is within normal limits.     GERD: Stable.    Supplements: Stable      PLAN:                            Recommend continuing current medications at this time.      Follow-up: 4-6 weeks for MTM    SUBJECTIVE/OBJECTIVE:                          Caio Morgan is a 63 year old male contacted via secure video for an initial visit. He was referred to me from psychiatry.      Reason for visit: SIADH/hyponatremia and citalopram/bupropion was discontinued but doing much worse. buproprion restarted but anger and mood is very very difficult and affecting relationships. Started mirtazapine - one review article found lower incidence. Seeking guidance.    Allergies/ADRs: Reviewed in chart  Past Medical History:  Reviewed in chart  Tobacco: He reports that he has never smoked. He has never used smokeless tobacco.  Alcohol: not currently using    Medication Adherence/Access: no issues reported    Depression:   Current medications:   Wellbutrin XL 300mg daily  Mirtazapine 7.5mg nightly x3 nights then increase to 15mg nightly    Steve is seen at [facility] by Anant Pinto. Most recent visit was 5/25/23 at which time remeron was started. Please see note by pt's provider for additional details regarding symptoms and history.     Today, patient reports he was hospitalized in Dec 2022 for 2 weeks then transitional care unit for 2 weeks due to low sodium and high potassium. Due to this his citalopram and Wellbutrin were discontinued . He has been working with nephrologist since. After stopping citalopram and Wellbutrin he reports worsening mood, irritability. PCP restarted Wellbutrin, but it hasn't been fully effective. Citalopram and Wellbutrin combo was somewhat helpful. He started Remeron last night, had some trouble waking up this morning.  Slept last night 9p - 545am then slept for another 1 hour after dropping his wife off. Typically goes to bed around 10-11pm. Believes Wellbutrin causes some nausea, but no other issues with it.    Last Comprehensive Metabolic Panel:  Lab Results   Component Value Date     (L) 04/24/2023    POTASSIUM 5.2 05/02/2023    CHLORIDE 103 04/24/2023    CO2 20 (L) 04/24/2023    ANIONGAP 11 04/24/2023     (H) 04/24/2023    BUN 25.5 (H) 04/24/2023    CR 1.36 (H) 05/02/2023    GFRESTIMATED 58 (L) 05/02/2023    CHAR 9.6 04/24/2023         3/10/2023     1:47 PM 5/17/2023     7:48 AM 5/25/2023     1:19 PM   PHQ   PHQ-9 Total Score 10 14 16   Q9: Thoughts of better off dead/self-harm past 2 weeks Several days Not at all Not at all   F/U: Thoughts of suicide or self-harm No     F/U: Safety concerns No           12/20/2022     9:43 AM 1/27/2023     10:03 AM 3/10/2023     1:48 PM   JOSE MANUEL-7 SCORE   Total Score 2 (minimal anxiety) 2 (minimal anxiety) 5 (mild anxiety)   Total Score 2 2 5           Type 2 Diabetes:  Currently taking glipizide 10mg daily, Lantus 35units daily, metformin 1000mg twice daily. Nausea with metformin? CGM BG monitoring.  Aspirin: Taking 81mg daily for primary prevention   Statin: Yes: simva   ACEi/ARB: Yes  Urine Albumin:   Lab Results   Component Value Date    UMALCR 116.17 (H) 02/06/2023      Lab Results   Component Value Date    A1C 7.6 05/02/2023    A1C 8.1 01/27/2023    A1C 9.3 01/07/2023    A1C 9.2 09/01/2022    A1C 8.6 03/21/2022    A1C 7.4 02/22/2021    A1C 9.9 03/12/2020    A1C 11.7 12/05/2019    A1C 11.6 08/29/2019    A1C 11.8 12/14/2018         Hyperlipidemia: Currently taking simvastatin 20mg daily.  Patient reports no significant myalgias or other side effects.  The ASCVD Risk score (Carlos LEYVA, et al., 2019) failed to calculate for the following reasons:    The valid total cholesterol range is 130 to 320 mg/dL  Recent Labs   Lab Test 01/27/23  1126 01/08/23  0549 05/19/16  0804 05/21/15  1104   CHOL 127 114   < > 137   HDL 33* 30*   < > 30*   LDL 71 64   < > 78   TRIG 113 99   < > 144   CHOLHDLRATIO  --   --   --  4.6    < > = values in this interval not displayed.     Hypertension: Currently taking losartan 100mg daily, atenolol 25mg daily.  Patient reports no current medication side effects.  BP Readings from Last 3 Encounters:   05/04/23 125/69   05/03/23 (!) 143/77   04/28/23 (!) 156/85     Pulse Readings from Last 3 Encounters:   05/04/23 68   05/03/23 68   04/28/23 69       Hypothyroidism: Currently taking Levoxyl 137 mcg daily. Patient is having the following symptoms: none.   TSH   Date Value Ref Range Status   01/27/2023 0.78 0.30 - 4.20 uIU/mL Final   09/01/2022 0.73 0.40 - 4.00 mU/L Final   03/05/2020 0.55 0.40 - 4.00 mU/L Final       GERD: Current medications include: Protonix (pantoprazole) 40mg as needed.  Patient  feels that current regimen is effective.    Supplements: Calcium+vitamin D, vitamin D, ferrous sulfate, vitamin B12, multivitamin.     Lab Results   Component Value Date    VITDT 44 11/13/2014       ----------------      I spent 45 minutes with this patient today. A copy of the visit note was provided to the patient's provider(s).    A summary of these recommendations was sent via [employer].    Haylie Grullon, PharmD, BCPP  Medication Therapy Management Pharmacist  [facility]    Telemedicine Visit Details  Type of service:  Video Conference via [employer]  Start Time: 1:22 PM  End Time: 2:08 PM     Medication Therapy Recommendations  No medication therapy recommendations to display

## 2023-06-01 NOTE — TELEPHONE ENCOUNTER
Patient to hold  glipizide, insulin, metformin day of procedure.     Hold atenolol (beta blocker) night before procedure. And hold day of procedure as well.     If he uses the basaglar (insuline glargine) at bedtime, then he would hold this the night before his procedure.

## 2023-06-02 RX ORDER — BUPROPION HYDROCHLORIDE 300 MG/1
300 TABLET ORAL EVERY MORNING
COMMUNITY
End: 2023-09-07

## 2023-06-02 NOTE — PATIENT INSTRUCTIONS
"Recommendations from today's MTM visit:                                                      Recommend continuing current medications at this time.      Follow-up: 4-6 weeks for MTM    It was great speaking with you today.  I value your experience and would be very thankful for your time in providing feedback in our clinic survey. In the next few days, you may receive an email or text message from [employer] with a link to a survey related to your  clinical pharmacist.\"     To schedule another MTM appointment, please call the clinic directly or you may call the MTM scheduling line at [phone] or toll-free at [phone].     My Clinical Pharmacist's contact information:                                                      Please feel free to contact me with any questions or concerns you have.      Haylie Grullon, PharmD, BCPP  Medication Therapy Management Pharmacist  [facility]     "

## 2023-06-05 ENCOUNTER — HOSPITAL ENCOUNTER (OUTPATIENT)
Dept: NUCLEAR MEDICINE | Facility: CLINIC | Age: 63
Setting detail: NUCLEAR MEDICINE
Discharge: HOME | End: 2023-06-05
Attending: PHYSICIAN ASSISTANT
Payer: MEDICARE

## 2023-06-05 ENCOUNTER — HOSPITAL ENCOUNTER (OUTPATIENT)
Dept: CARDIOLOGY | Facility: CLINIC | Age: 63
Discharge: HOME | End: 2023-06-05
Attending: PHYSICIAN ASSISTANT
Payer: MEDICARE

## 2023-06-05 ENCOUNTER — PATIENT OUTREACH (OUTPATIENT)
Dept: FAMILY MEDICINE | Facility: CLINIC | Age: 63
End: 2023-06-05

## 2023-06-05 DIAGNOSIS — R06.09 DOE (DYSPNEA ON EXERTION): ICD-10-CM

## 2023-06-05 LAB
CV STRESS MAX HR HE: 94
NUC STRESS EJECTION FRACTION: 66 %
RATE PRESSURE PRODUCT: NORMAL
STRESS ECHO BASELINE DIASTOLIC HE: 85
STRESS ECHO BASELINE HR: 64 BPM
STRESS ECHO BASELINE SYSTOLIC BP: 170
STRESS ECHO CALCULATED PERCENT HR: 61 %
STRESS ECHO LAST STRESS DIASTOLIC BP: 92
STRESS ECHO LAST STRESS SYSTOLIC BP: 175
STRESS ECHO TARGET HR: 155

## 2023-06-05 PROCEDURE — 93017 CV STRESS TEST TRACING ONLY: CPT

## 2023-06-05 PROCEDURE — G1010 CDSM STANSON: HCPCS | Performed by: INTERNAL MEDICINE

## 2023-06-05 PROCEDURE — A9502 TC99M TETROFOSMIN: HCPCS | Performed by: PHYSICIAN ASSISTANT

## 2023-06-05 PROCEDURE — 343N000001 HC RX 343: Performed by: PHYSICIAN ASSISTANT

## 2023-06-05 PROCEDURE — 78452 HT MUSCLE IMAGE SPECT MULT: CPT | Mod: 26 | Performed by: INTERNAL MEDICINE

## 2023-06-05 PROCEDURE — 93018 CV STRESS TEST I&R ONLY: CPT | Performed by: INTERNAL MEDICINE

## 2023-06-05 PROCEDURE — 93016 CV STRESS TEST SUPVJ ONLY: CPT | Performed by: INTERNAL MEDICINE

## 2023-06-05 PROCEDURE — G1010 CDSM STANSON: HCPCS

## 2023-06-05 PROCEDURE — 250N000011 HC RX IP 250 OP 636: Performed by: PHYSICIAN ASSISTANT

## 2023-06-05 RX ORDER — ACYCLOVIR 200 MG/1
0-1 CAPSULE ORAL
Status: DISCONTINUED | OUTPATIENT
Start: 2023-06-05 | End: 2023-06-06 | Stop reason: HOSPADM

## 2023-06-05 RX ORDER — AMINOPHYLLINE 25 MG/ML
50-100 INJECTION, SOLUTION INTRAVENOUS
Status: DISCONTINUED | OUTPATIENT
Start: 2023-06-05 | End: 2023-06-06 | Stop reason: HOSPADM

## 2023-06-05 RX ORDER — ALBUTEROL SULFATE 90 UG/1
2 AEROSOL, METERED RESPIRATORY (INHALATION) EVERY 5 MIN PRN
Status: DISCONTINUED | OUTPATIENT
Start: 2023-06-05 | End: 2023-06-06 | Stop reason: HOSPADM

## 2023-06-05 RX ORDER — REGADENOSON 0.08 MG/ML
0.4 INJECTION, SOLUTION INTRAVENOUS ONCE
Status: COMPLETED | OUTPATIENT
Start: 2023-06-05 | End: 2023-06-05

## 2023-06-05 RX ADMIN — REGADENOSON 0.4 MG: 0.08 INJECTION, SOLUTION INTRAVENOUS at 12:53

## 2023-06-05 RX ADMIN — TETROFOSMIN 32 MILLICURIE: 1.38 INJECTION, POWDER, LYOPHILIZED, FOR SOLUTION INTRAVENOUS at 13:06

## 2023-06-05 RX ADMIN — TETROFOSMIN 11 MILLICURIE: 1.38 INJECTION, POWDER, LYOPHILIZED, FOR SOLUTION INTRAVENOUS at 11:42

## 2023-06-05 NOTE — TELEPHONE ENCOUNTER
Lisa Pierre, PA-C    Patient completed lexiscan stress test today. Would like provider to be aware that pt was unsure of medication instructions for procedure and took half the dose- 18 units of insulin glargine (basaglar) last night. Held all other medications.     Patient reports he did not receive [email or web address] message with medication instructions for Lexiscan stress test & could not remember all of the recommendations.   -RN apologized that message was not sent. Advised patient if he is expecting [email or web address] message & it is not received to call back. Patient agreeable to plan.     -Patient reports he got through stress test and will wait for result.    Patient reports he took 18 units of Basaglar last night. Patient reports he held all other medications.     Yudith GARCIA RN, BSN, PHN  [facility]  [phone]

## 2023-06-07 ENCOUNTER — THERAPY VISIT (OUTPATIENT)
Dept: OCCUPATIONAL THERAPY | Facility: CLINIC | Age: 63
End: 2023-06-07
Attending: PHYSICIAN ASSISTANT
Payer: MEDICARE

## 2023-06-07 ENCOUNTER — THERAPY VISIT (OUTPATIENT)
Dept: PHYSICAL THERAPY | Facility: CLINIC | Age: 63
End: 2023-06-07
Attending: PHYSICIAN ASSISTANT
Payer: MEDICARE

## 2023-06-07 DIAGNOSIS — Z78.9 DEFICIT IN ACTIVITIES OF DAILY LIVING (ADL): Primary | ICD-10-CM

## 2023-06-07 DIAGNOSIS — M24.549 CONTRACTURE OF HAND: ICD-10-CM

## 2023-06-07 PROCEDURE — 97535 SELF CARE MNGMENT TRAINING: CPT | Mod: GO

## 2023-06-07 PROCEDURE — 97112 NEUROMUSCULAR REEDUCATION: CPT | Mod: GP | Performed by: PHYSICAL THERAPIST

## 2023-06-07 PROCEDURE — 97116 GAIT TRAINING THERAPY: CPT | Mod: GP | Performed by: PHYSICAL THERAPIST

## 2023-06-08 ENCOUNTER — THERAPY VISIT (OUTPATIENT)
Dept: OCCUPATIONAL THERAPY | Facility: CLINIC | Age: 63
End: 2023-06-08
Payer: MEDICARE

## 2023-06-08 ENCOUNTER — VIRTUAL VISIT (OUTPATIENT)
Dept: PSYCHOLOGY | Facility: CLINIC | Age: 63
End: 2023-06-08
Payer: MEDICARE

## 2023-06-08 ENCOUNTER — PATIENT OUTREACH (OUTPATIENT)
Dept: FAMILY MEDICINE | Facility: CLINIC | Age: 63
End: 2023-06-08

## 2023-06-08 DIAGNOSIS — M24.549 CONTRACTURE OF HAND: Primary | ICD-10-CM

## 2023-06-08 DIAGNOSIS — F33.1 MODERATE EPISODE OF RECURRENT MAJOR DEPRESSIVE DISORDER (H): Primary | ICD-10-CM

## 2023-06-08 PROCEDURE — 97760 ORTHOTIC MGMT&TRAING 1ST ENC: CPT | Mod: GO | Performed by: OCCUPATIONAL THERAPIST

## 2023-06-08 PROCEDURE — 90834 PSYTX W PT 45 MINUTES: CPT | Mod: VID | Performed by: SOCIAL WORKER

## 2023-06-08 ASSESSMENT — PATIENT HEALTH QUESTIONNAIRE - PHQ9
SUM OF ALL RESPONSES TO PHQ QUESTIONS 1-9: 18
10. IF YOU CHECKED OFF ANY PROBLEMS, HOW DIFFICULT HAVE THESE PROBLEMS MADE IT FOR YOU TO DO YOUR WORK, TAKE CARE OF THINGS AT HOME, OR GET ALONG WITH OTHER PEOPLE: VERY DIFFICULT
SUM OF ALL RESPONSES TO PHQ QUESTIONS 1-9: 18

## 2023-06-08 NOTE — TELEPHONE ENCOUNTER
"Lisa Pierre PA-C    Patient has reviewed results note on yane scan stress test   He is not satisfied with the \"its normal\" as he still has this pain between breast bones with shortness of breath and is wondering what the plan is now     Thank you,   Marcela Blackburn, Registered Nurse, PAL (Patient Advocate Liaison)   [facility]   [phone]     "

## 2023-06-08 NOTE — PROGRESS NOTES
[facility]                                     Progress Note    Patient Name: Steve Morgan  Date: June 8, 2023         Service Type: Individual      Session Start Time: 13:05  Session End Time: 13:55     Session Length: 50    Session #: 3    Attendees: Client attended alone    Service Modality:  Video Visit:      Provider verified identity through the following two step process.  Patient provided:  Patient is known previously to provider    Telemedicine Visit: The patient's condition can be safely assessed and treated via synchronous audio and visual telemedicine encounter.      Reason for Telemedicine Visit: Patient has requested telehealth visit    Originating Site (Patient Location): Patient's home    Distant Site (Provider Location): Provider Remote Setting- Home Office    Consent:  The patient/guardian has verbally consented to: the potential risks and benefits of telemedicine (video visit) versus in person care; bill my insurance or make self-payment for services provided; and responsibility for payment of non-covered services.     Patient would like the video invitation sent by:  My Chart    Mode of Communication:  Video Conference via [employer]    Distant Location (Provider):  Off-site    As the provider I attest to compliance with applicable laws and regulations related to telemedicine.    DATA  Extended Session (53+ minutes): No  Interactive Complexity: No  Crisis: No        Progress Since Last Session (Related to Symptoms / Goals / Homework):   Symptoms: Improving able to think more before he responds in anger    Homework: Achieved / completed to satisfaction      Episode of Care Goals: Achieved / completed to satisfaction - PREPARATION (Decided to change - considering how); Intervened by negotiating a change plan and determining options / strategies for behavior change, identifying triggers, exploring social supports, and working towards setting a date to begin behavior  change     Current / Ongoing Stressors and Concerns:   Struggles with motivation, getting along with wife and sister-in-law and being able to express and deal with emotions in healthy ways.     Treatment Objective(s) Addressed in This Session:   Decrease frequency and intensity of feeling down, depressed, hopeless  Create treatment plan     Intervention:   ACT: Worked with patient to describe in more detail what it means to have increased motivation.  Supported patient to name what emotion is present when he is making the decision to engage in activities versus watch.  Patient named anger.  Supported patient to name ways other than watching tv that he uses to deal with anger.  Patient brought up wanting to engage in couple's counseling and noting issues with anger also stem from issues in relationship.  Anger responses:  Not cleaning or doing other work the house  [place]  Not saying anything    Assessments completed prior to visit:  The following assessments were completed by patient for this visit:  PHQ2:       3/10/2023     1:53 PM 6/12/2022     4:29 PM 3/28/2022    10:47 AM 11/16/2021    10:14 AM 9/23/2021    10:32 AM 9/2/2020     9:26 AM 1/16/2020     9:05 AM   PHQ-2 ( 1999 [employer])   Q1: Little interest or pleasure in doing things  3 0 1 3 3 0   Q2: Feeling down, depressed or hopeless  1 0 1 1 3 0   PHQ-2 Score  4 0 2 4 6 0   PHQ-2 Total Score (12-17 Years)- Positive if 3 or more points; Administer PHQ-A if positive     4 6 0   Q1: Little interest or pleasure in doing things  Nearly every day  Several days Nearly every day Nearly every day    Q2: Feeling down, depressed or hopeless  Several days  Several days Several days Nearly every day    PHQ-2 Score Incomplete 4  2 4 6      PHQ9:       12/6/2022     3:05 PM 12/20/2022     9:41 AM 1/27/2023    10:02 AM 3/10/2023     1:47 PM 5/17/2023     7:48 AM 5/25/2023     1:19 PM 6/8/2023    12:46 PM   PHQ-9 SCORE   PHQ-9 Total Score [place] 9 (Mild depression) 6 (Mild  depression) 6 (Mild depression) 10 (Moderate depression) 14 (Moderate depression) 16 (Moderately severe depression) 18 (Moderately severe depression)   PHQ-9 Total Score 9 6    6    6 6 10 14 16 18     PROMIS 10-Global Health (all questions and answers displayed):       10/27/2022    12:43 PM 5/15/2023    10:44 AM 5/21/2023     4:35 PM   PROMIS 10   In general, would you say your health is: Fair Fair Fair   In general, would you say your quality of life is: Good Good Very good   In general, how would you rate your physical health? Poor Fair Poor   In general, how would you rate your mental health, including your mood and your ability to think? Good Fair Fair   In general, how would you rate your satisfaction with your social activities and relationships? Good Fair Fair   In general, please rate how well you carry out your usual social activities and roles Fair Good Fair   To what extent are you able to carry out your everyday physical activities such as walking, climbing stairs, carrying groceries, or moving a chair? Mostly Moderately Mostly   In the past 7 days, how often have you been bothered by emotional problems such as feeling anxious, depressed, or irritable? Sometimes Often Often   In the past 7 days, how would you rate your fatigue on average? Moderate Moderate Moderate   In the past 7 days, how would you rate your pain on average, where 0 means no pain, and 10 means worst imaginable pain? 2 1 0   In general, would you say your health is: 2 2 2   In general, would you say your quality of life is: 3 3 4   In general, how would you rate your physical health? 1 2 1   In general, how would you rate your mental health, including your mood and your ability to think? 3 2 2   In general, how would you rate your satisfaction with your social activities and relationships? 3 2 2   In general, please rate how well you carry out your usual social activities and roles. (This includes activities at home, at work and in  your community, and responsibilities as a parent, child, spouse, employee, friend, etc.) 2 3 2   To what extent are you able to carry out your everyday physical activities such as walking, climbing stairs, carrying groceries, or moving a chair? 4 3 4   In the past 7 days, how often have you been bothered by emotional problems such as feeling anxious, depressed, or irritable? 3 4 4   In the past 7 days, how would you rate your fatigue on average? 3 3 3   In the past 7 days, how would you rate your pain on average, where 0 means no pain, and 10 means worst imaginable pain? 2 1 0   Global Mental Health Score 12 9 10   Global Physical Health Score 12 12 13   PROMIS TOTAL - SUBSCORES 24 21 23         ASSESSMENT: Current Emotional / Mental Status (status of significant symptoms):   Risk status (Self / Other harm or suicidal ideation)   Patient denies current fears or concerns for personal safety.   Patient denies current or recent suicidal ideation or behaviors.   Patient denies current or recent homicidal ideation or behaviors.   Patient denies current or recent self injurious behavior or ideation.   Patient denies other safety concerns.   Patient reports there has been no change in risk factors since their last session.     Patient reports there has been no change in protective factors since their last session.     Recommended that patient call 911 or go to the local ED should there be a change in any of these risk factors.     Appearance:   Appropriate    Eye Contact:   Good    Psychomotor Behavior: Normal    Attitude:   Cooperative  Interested   Orientation:   All   Speech    Rate / Production: Normal/ Responsive    Volume:  Normal    Mood:    Normal   Affect:    Subdued    Thought Content:  Clear    Thought Form:  Coherent  Logical    Insight:    Good      Medication Review:   Changes to psychiatric medications, see updated Medication List in EPIC.      Medication Compliance:   Yes psychiatrist started  medications     Changes in Health Issues:   Yes: Diabetes, Associated Psychological Distress     Chemical Use Review:   Substance Use: Chemical use reviewed, no active concerns identified      Tobacco Use: No current tobacco use.      Diagnosis:  1. Moderate episode of recurrent major depressive disorder (H)        Collateral Reports Completed:   Not Applicable    PLAN: (Patient Tasks / Therapist Tasks / Other)  Will talk to wife about couple's counseling      Veronica Partha, LICSW                                                         ______________________________________________________________________    Individual Treatment Plan    Patient's Name: Steve Morgan  YOB: 1960    Date of Creation: May 17, 2023  Date Treatment Plan Last Reviewed/Revised: May 17, 2023    DSM5 Diagnoses: 296.35 (F33.41)  Major Depressive Disorder, Recurrent Episode, In partial remission With melancholic features  Psychosocial / Contextual Factors: Family Factors Patient struggles with getting along with sister-in-law and Societal Factors Patient notes patriachial values that influence his thoughts around emotions.  PROMIS (reviewed every 90 days): PROMIS-10 Scores        10/27/2022    12:43 PM 5/15/2023    10:44 AM 5/21/2023     4:35 PM   PROMIS-10 Total Score w/o Sub Scores   PROMIS TOTAL - SUBSCORES 24 21 23       Referral / Collaboration:  Referral to another professional/service is not indicated at this time..    Anticipated number of session for this episode of care: 9-12 sessions  Anticipation frequency of session: Every other week  Anticipated Duration of each session: 38-52 minutes  Treatment plan will be reviewed in 90 days or when goals have been changed.       MeasurableTreatment Goal(s) related to diagnosis / functional impairment(s)  Goal 1: Patient will work to increase motivation    I will know I've met my goal when will clean the house work for 1-2 hours each day, yard work once e/o week, using  [employer] daily, I would have a spread sheet to track bills that come out automatically and twice per month updating, I am talking to wife about activities to do together and volunteering a couple of times per week.      Objective #A (Patient Action)    Patient will Decrease frequency and intensity of feeling down, depressed, hopeless.  Status: New - Date: 5/17/23     Intervention(s)  Therapist will teach grounding.    Objective #B  Patient will Increase interest, engagement, and pleasure in doing things.  Status: New - Date: 5/17/23     Intervention(s)  Therapist will assign homework to engage in valued action  provide educational materials on values  teach valued action.    Objective #C  Patient will Improve concentration, focus, and mindfulness in daily activities .  Status: New - Date: 5/17/23     Intervention(s)  Therapist will assign homework to engage in mindfulness of emotion and thought defusion  provide educational materials on on mindfulness and thoughts  teach thought defusion and acceptance of emotion.      Patient has reviewed and agreed to the above plan.      Veronica Chavis, [facility] Psychiatric CenterSW  May 17, 2023

## 2023-06-08 NOTE — TELEPHONE ENCOUNTER
We ruled out significant cardiac issue. It would be good if pt could come into the office for an exam so we could determine other causes.     Okay to use my KIARA spots.

## 2023-06-12 NOTE — TELEPHONE ENCOUNTER
Lisa Pierre PA-C-    Please advise, okay for patient to wait until 6/30 for in person appointment or recommend patient seen by extender?    Spoke to patient to inform of recommendation below.     Patient currently scheduled for Medicare wellness visit on 6/30/23. Okay for patient to wait until visit?  -RN attempted to schedule patient with Stefany Chan PA-C on 6/15/23. Patient declined, prefers appointment with PCP.   -PCP next available in person hold is 6/29/23. Patient reports he can wait until 6/30/23.    Yudith GARCIA RN, BSN, PHN - PAL (patient advocate liaison)  [facility]  [phone]

## 2023-06-13 NOTE — TELEPHONE ENCOUNTER
Spoke to patient and informed of providers response below.     Advised patient to contact this RN if patient would like to be seen sooner.     Patient verbalized understanding and agreeable to plan.    Yudith GARCIA RN, BSN, PHN - PAL (patient advocate liaison)  [facility]  [phone]

## 2023-06-16 NOTE — TELEPHONE ENCOUNTER
Provider reviewed, see encounter 6/8/23.  Yudith GARCIA RN, BSN, PHN - PAL (patient advocate liaison)  [facility]  [phone]

## 2023-06-21 ENCOUNTER — THERAPY VISIT (OUTPATIENT)
Dept: OCCUPATIONAL THERAPY | Facility: CLINIC | Age: 63
End: 2023-06-21
Attending: PHYSICIAN ASSISTANT
Payer: MEDICARE

## 2023-06-21 ENCOUNTER — THERAPY VISIT (OUTPATIENT)
Dept: PHYSICAL THERAPY | Facility: CLINIC | Age: 63
End: 2023-06-21
Attending: PHYSICIAN ASSISTANT
Payer: MEDICARE

## 2023-06-21 DIAGNOSIS — M24.549 CONTRACTURE OF HAND: Primary | ICD-10-CM

## 2023-06-21 DIAGNOSIS — Z78.9 DEFICIT IN ACTIVITIES OF DAILY LIVING (ADL): ICD-10-CM

## 2023-06-21 DIAGNOSIS — Z78.9 DEFICIT IN ACTIVITIES OF DAILY LIVING (ADL): Primary | ICD-10-CM

## 2023-06-21 PROCEDURE — 97112 NEUROMUSCULAR REEDUCATION: CPT | Mod: GP | Performed by: PHYSICAL THERAPIST

## 2023-06-21 PROCEDURE — 97535 SELF CARE MNGMENT TRAINING: CPT | Mod: GO

## 2023-06-21 PROCEDURE — 97116 GAIT TRAINING THERAPY: CPT | Mod: GP | Performed by: PHYSICAL THERAPIST

## 2023-06-21 NOTE — PROGRESS NOTES
[facility]                                                                                   OUTPATIENT OCCUPATIONAL THERAPY      PLAN OF TREATMENT FOR OUTPATIENT REHABILITATION   Patient's Last Name, First Name, Steve Hernandez YOB: 1960   Provider's Name   [facility]   Medical Record No.  [id number]     Onset Date: 03/10/23 (date of order used) Start of Care Date: 03/30/23     Medical Diagnosis:  Encounter for long-term (current) use of medications; Deficit in activities of daily living (ADL); Self-care deficit for toileting; Contracture of hand      OT Treatment Diagnosis:  Decreased safety and IND with ADLs/IADLs Plan of Treatment  Frequency/Duration:1x/week/12 weeks (anticipated 1-3 sessions in 12 weeks)    Certification date from 06/21/23   To 09/13/23        See note for plan of treatment details and functional goals     JAI Lay                         I CERTIFY THE NEED FOR THESE SERVICES FURNISHED UNDER        THIS PLAN OF TREATMENT AND WHILE UNDER MY CARE     (Physician attestation of this document indicates review and certification of the therapy plan).                  Referring Provider:  Lisa Pierre PA-C      Initial Assessment  See Epic Evaluation- 03/30/23          PLAN  Continue therapy per current plan of care. Pt has been seen for a total of 7 OP OT sessions with progress in the below goal areas. Pt overall demonstrates significant improvement in self-awareness and management of fatigue symptoms and ability to demonstrate compensatory strategies/equipment for UE coordination and memory tasks. Anticipate 1-3 more sessions in the next 12 weeks to reassess and address remaining needs, as well as finalize home program prior to discharge.    Beginning/End Dates of Progress Note Reporting Period:  03/30/23  to 06/21/2023    Referring Provider:  Lisa Pierre PA-C       06/21/23 [id number]    Appointment Info   Treating Provider Enedina Lim OTR/L   Visits Used 7/9 - MEDICARE; BCBS [facility]   Medical Diagnosis Encounter for long-term (current) use of medications; Deficit in activities of daily living (ADL); Self-care deficit for toileting; Contracture of hand   OT Tx Diagnosis Decreased safety and IND with ADLs/IADLs   Quick Add  Certification   Progress Note/Certification   Start Of Care Date 03/30/23   Onset of Illness/Injury or Date of Surgery 03/10/23  (date of order used)   Therapy Frequency 1x/week   Predicted Duration 12 weeks (anticipated 1-3 sessions in 90 days)   Certification date from 06/21/23   Certification date to 09/13/23   Progress Note Due Date 09/13/23   Progress Note Completed Date 06/21/23   Goals   OT Goals 1;2;3;4;5   OT Goal 1   Goal Identifier Fatigue Management   Goal Description Pt will demonstrate 3 energy conservation strategies (e.g. pacing, planning, prioritizing, sleep hygiene, etc.) to facilitate fatigue management with ADL/IADL tasks (e.g. laundry, meal preparation, walking/exercise).   Goal Progress Goal met. Pt educated in fatigue management strategies including planning, prioritizing, pacing, and posture with application to ADLs/IADLs. Additionally, educated in sleep hygiene strategies to further promote fatigue management and overall engagement in ADLs/IADLs. Pt reports subjective improvement in energy following recent improvements in sleep and several weeks on new antidepressant medication. Pt to continue utilizing compensatory strategies prn for energy conservation/work simplification.   Target Date 06/22/23   Date Met 06/21/23   OT Goal 2   Goal Identifier Hand Strength   Goal Description Pt will demonstrate improved bilateral strength by 10#  strength and 3# pinch strength (lateral and palmar) for improved ADL/IADL completion. (e.g. grasping utensils, opening containers, holding/carrying objects).   Goal Progress Goal progressing. Pt trained in HEP using  blue foam block, red theraputty, and hand gripper focused on building  and pinch strength for IND with ADLs/IADLs. Pt initially demonstrating good adherence to HEP but reports decreased adherence in recent weeks. Plan to reassess in future session and further address, as able, to maximize /pinch strength for ADLs/IADLs.   Target Date 09/13/23   OT Goal 3   Goal Identifier UE HEP   Goal Description Pt will demonstrate independent completion of 1-3 activity HEP for UE strength/coordination to promote independence with ADLs/IADLs (e.g. retrieving object from cabinet or refrigerator, microwaving food, hanging laundry).   Goal Progress Goal progressing. Educated and trained in multi-activity HEP to further promote hand strength and coordination for IND with ADLs/IADLs, including: card games (speed), arm exercises (ROM only), blue foam block, red theraputty and finger taps, and hand gripper. Will plan to reassess needs and further progress HEP prn in future session(s).   Target Date 09/13/23   OT Goal 4   Goal Identifier Adaptive Equipment/Strategies   Goal Description Patient to verbalize understanding of and demonstrate use of 3 new pieces of adaptive equipment and/or adapted techniques to increase independence and safety with ADL/IADLs (feeding self, dressing, bathing, toileting, functional mobility, meal preparation, etc.)   Goal Progress Goal progressing. To promote IND with ADLs/IADLs, educated and trained in use of the following adaptive strategies and equipment: adapted computer mouse, rubber finger for mail, energy conservation strategies, card bishop, memory strategies, zipper pull, button hook, long handled sponge, and reacher. Anticipate education in tremor management strategies and plan to reassess needs in future session(s).   Target Date 09/13/23   OT Goal 5   Goal Identifier Memory/Safety   Goal Description Patient to verbalize understanding of  cognitive screen results and identify 3 strategies to  "increase patient's safety and independence in the home and community setting.   Goal Progress Goal met. Pt educated in memory compensatory strategies, including lifestyle factors (physical and cognitive exercise, diet, vision/hearing, sleep, socialization, mental health) as well as specific strategies (attention, association, chunking/clustering, visualization, language-based strategies, repetition/rehearsal, organization, and external aids). Additionally, educated in cognitive HEP using speed card game as therapeutic tool.   Target Date 06/22/23   Date Met 06/21/23   Subjective Report   Subjective Report Pt reports that he is feeling much better, he was sick with the flu last week and had to miss his appointment. Reports he has \"slacked off\" on some of his hand exercises at home. Has been playing [employer] a lot and is trying to apply the memory strategies covered in session. Also just started a new matching game on his iPad.   Objective Measures   Objective Measures Objective Measure 1   Objective Measure 1   Objective Measure MoCA 8.1   Details As of 5/17/23- To further assess cognition, administered the Nain Cognitive Assessment (MoCA), which was designed as a rapid screening instrument for mild cognitive dysfunction with a score of 26/30 considered normal. Administered MoCA 8.1 with pt scoring 24/30 (MIS = 14/15), indicating potential mild cognitive impairment. Pt scored 3/5 on visuospatial/executive, 3/3 on naming, 5/6 on attention, 1/3 on language, 2/2 on abstraction, 4/5 on delayed recall (recalls 4, gets 5th with category cue), and 6/6 on orientation.   Objective Measure 2   Objective Measure L IF active PIP extension/flexion   Details -71/112   Objective Measure 3   Objective Measure L IF active DIP extension/flexion   Details -50/72   Objective Measure 4   Objective Measure LUE  strength   Details 39#   Treatment Interventions (OT)   Interventions Therapeutic Activity;Self Care/Home " "Management;Therapeutic Procedure/Exercise   Self Care/Home Management   Self-Care/Home Mgmt/ADL, Compensatory, Meal Prep Minutes (97150) 45 Minutes   Self Care 1 Compensatory strategies and adaptive equipment   Self Care 1 - Details Educated using \"Tips for Sleep Hygiene\" handout, including setting/maintaing regular sleep schedule (bedtime, wake time), listening to one's body (sleep when you feel tired), avoiding/limiting caffeine (especially later in the day), using bed for sleeping only, avoiding naps (if needed, keep to 20 mins, before 3pm), avoid electronic media (phone/TV within 2 hrs of bed time, develop healthy sleep ritual- ex. reading), dim the lights at night, try a bath/shower 1-2 hrs before bed (warm not hot water, using cassie cloth robe for EC), using a sleep diary, regular exercise/healthy diet, create right sleep environment, and keeping daytime routine regular even if one has a bad night sleep (prevent cycle of bad sleep). To further promote engagement and IND with ADLs/IADLs, provided education and training in adaptive equipment, including button hook, zipper pull (issued to patient this date), long handled sponge, and sock aid. Provided written record of each piece of equipment for patient to order prn.   Skilled Intervention Skilled education and training in sleep hygiene strategies and adaptive equipment for IND with I/ADLs   Patient Response/Progress Pt reports he likes zipper pull and would be interested in a reacher for picking up items from the floor. Goals 1 and 4 progressing   Education   Learner/Method Patient;Listening;Reading;Demonstration;Pictures/Video   Readiness Acceptance   Education Notes see tx details above   Equipment zipper pull   Plan   Home program card games (speed), arm exercises (ROM only), blue foam block, red theraputty and finger taps, hand gripper   Plan for next session *discharge or add 1-2 more appointments? Reassess  strength. Tremor management and built up " handles. MMT + UE strengthening. Hand strengthening - progress as able. Review: UE AROM, putty f/u red theraputty, rubber band, order hand gripper?   Homework adapted computer mouse, rubber finger for mail, ECWS basics, card bishop, memory strategies, AE (zipper pull, button hook, long handled sponge, reacher)   Total Session Time   Timed Code Treatment Minutes 45   Total Treatment Time (sum of timed and untimed services) 45   Session Number   Authorization status CERT REQUIRED. TELEHEALTH - YES.   Clinical Impression   OT Diagnosis Decreased safety and IND with ADLs/IADLs

## 2023-06-25 SDOH — HEALTH STABILITY: PHYSICAL HEALTH: ON AVERAGE, HOW MANY DAYS PER WEEK DO YOU ENGAGE IN MODERATE TO STRENUOUS EXERCISE (LIKE A BRISK WALK)?: 0 DAYS

## 2023-06-25 SDOH — ECONOMIC STABILITY: INCOME INSECURITY: IN THE LAST 12 MONTHS, WAS THERE A TIME WHEN YOU WERE NOT ABLE TO PAY THE MORTGAGE OR RENT ON TIME?: NO

## 2023-06-25 SDOH — HEALTH STABILITY: PHYSICAL HEALTH: ON AVERAGE, HOW MANY MINUTES DO YOU ENGAGE IN EXERCISE AT THIS LEVEL?: 0 MIN

## 2023-06-25 SDOH — ECONOMIC STABILITY: FOOD INSECURITY: WITHIN THE PAST 12 MONTHS, YOU WORRIED THAT YOUR FOOD WOULD RUN OUT BEFORE YOU GOT MONEY TO BUY MORE.: NEVER TRUE

## 2023-06-25 SDOH — ECONOMIC STABILITY: INCOME INSECURITY: HOW HARD IS IT FOR YOU TO PAY FOR THE VERY BASICS LIKE FOOD, HOUSING, MEDICAL CARE, AND HEATING?: NOT HARD AT ALL

## 2023-06-25 SDOH — ECONOMIC STABILITY: FOOD INSECURITY: WITHIN THE PAST 12 MONTHS, THE FOOD YOU BOUGHT JUST DIDN'T LAST AND YOU DIDN'T HAVE MONEY TO GET MORE.: NEVER TRUE

## 2023-06-25 ASSESSMENT — ENCOUNTER SYMPTOMS
JOINT SWELLING: 0
SHORTNESS OF BREATH: 0
MYALGIAS: 0
DYSURIA: 0
CHILLS: 0
NERVOUS/ANXIOUS: 0
FEVER: 0
HEMATOCHEZIA: 0
SORE THROAT: 0
HEMATURIA: 0
HEARTBURN: 0
PALPITATIONS: 0
HEADACHES: 0
DIZZINESS: 0
NAUSEA: 1
PARESTHESIAS: 0
DIARRHEA: 1
ABDOMINAL PAIN: 0
WEAKNESS: 0
FREQUENCY: 0
ARTHRALGIAS: 0
CONSTIPATION: 0
EYE PAIN: 0
COUGH: 0

## 2023-06-25 ASSESSMENT — LIFESTYLE VARIABLES
SKIP TO QUESTIONS 9-10: 1
AUDIT-C TOTAL SCORE: 1
HOW MANY STANDARD DRINKS CONTAINING ALCOHOL DO YOU HAVE ON A TYPICAL DAY: PATIENT DOES NOT DRINK
HOW OFTEN DO YOU HAVE A DRINK CONTAINING ALCOHOL: MONTHLY OR LESS
HOW OFTEN DO YOU HAVE SIX OR MORE DRINKS ON ONE OCCASION: NEVER

## 2023-06-25 ASSESSMENT — SOCIAL DETERMINANTS OF HEALTH (SDOH)
HOW OFTEN DO YOU ATTEND CHURCH OR RELIGIOUS SERVICES?: 1 TO 4 TIMES PER YEAR
IN A TYPICAL WEEK, HOW MANY TIMES DO YOU TALK ON THE PHONE WITH FAMILY, FRIENDS, OR NEIGHBORS?: ONCE A WEEK
HOW OFTEN DO YOU GET TOGETHER WITH FRIENDS OR RELATIVES?: ONCE A WEEK
DO YOU BELONG TO ANY CLUBS OR ORGANIZATIONS SUCH AS CHURCH GROUPS UNIONS, FRATERNAL OR ATHLETIC GROUPS, OR SCHOOL GROUPS?: NO

## 2023-06-25 ASSESSMENT — ACTIVITIES OF DAILY LIVING (ADL): CURRENT_FUNCTION: NO ASSISTANCE NEEDED

## 2023-06-26 NOTE — TELEPHONE ENCOUNTER
"Refill request r'cd from [facility] via fax for mirtazapine (REMERON) 15 MG tablet  denied due to:  1. Pt was a no show on 6/23/23  2. Should have enough medications ~ 7/26/23   Faxed \"not authorized\" back to pharmacy stating that  too early to refill & pt need an appt.          mirtazapine (REMERON) 15 MG tablet 30 tablet 1 5/25/2023  --   Sig: Take 1/2 tablet nightly for 3 nights then increase to one tablet nightly   Sent to pharmacy as: Mirtazapine 15 MG Oral Tablet (REMERON)   Class: E-Prescribe       "

## 2023-06-28 ENCOUNTER — THERAPY VISIT (OUTPATIENT)
Dept: PHYSICAL THERAPY | Facility: CLINIC | Age: 63
End: 2023-06-28
Attending: PHYSICIAN ASSISTANT
Payer: MEDICARE

## 2023-06-28 DIAGNOSIS — Z78.9 DEFICIT IN ACTIVITIES OF DAILY LIVING (ADL): Primary | ICD-10-CM

## 2023-06-28 PROCEDURE — 97110 THERAPEUTIC EXERCISES: CPT | Mod: GP | Performed by: PHYSICAL THERAPIST

## 2023-06-30 ENCOUNTER — OFFICE VISIT (OUTPATIENT)
Dept: FAMILY MEDICINE | Facility: CLINIC | Age: 63
End: 2023-06-30
Payer: MEDICARE

## 2023-06-30 VITALS
WEIGHT: 239.8 LBS | SYSTOLIC BLOOD PRESSURE: 132 MMHG | DIASTOLIC BLOOD PRESSURE: 58 MMHG | HEART RATE: 82 BPM | HEIGHT: 65 IN | BODY MASS INDEX: 39.95 KG/M2 | OXYGEN SATURATION: 98 % | TEMPERATURE: 98.3 F

## 2023-06-30 DIAGNOSIS — R07.89 OTHER CHEST PAIN: ICD-10-CM

## 2023-06-30 DIAGNOSIS — R39.11 BENIGN PROSTATIC HYPERPLASIA WITH URINARY HESITANCY: ICD-10-CM

## 2023-06-30 DIAGNOSIS — Z00.00 MEDICARE ANNUAL WELLNESS VISIT, SUBSEQUENT: Primary | ICD-10-CM

## 2023-06-30 DIAGNOSIS — T14.8XXA SKIN ABRASION: ICD-10-CM

## 2023-06-30 DIAGNOSIS — N40.1 BENIGN PROSTATIC HYPERPLASIA WITH URINARY HESITANCY: ICD-10-CM

## 2023-06-30 PROCEDURE — G0439 PPPS, SUBSEQ VISIT: HCPCS | Performed by: PHYSICIAN ASSISTANT

## 2023-06-30 PROCEDURE — 0121A COVID-19 BIVALENT 12+ (PFIZER): CPT | Performed by: PHYSICIAN ASSISTANT

## 2023-06-30 PROCEDURE — 91312 COVID-19 BIVALENT 12+ (PFIZER): CPT | Performed by: PHYSICIAN ASSISTANT

## 2023-06-30 PROCEDURE — 99214 OFFICE O/P EST MOD 30 MIN: CPT | Mod: 25 | Performed by: PHYSICIAN ASSISTANT

## 2023-06-30 RX ORDER — MUPIROCIN 20 MG/G
OINTMENT TOPICAL 3 TIMES DAILY
Qty: 22 G | Refills: 1 | Status: ON HOLD | OUTPATIENT
Start: 2023-06-30 | End: 2024-05-18

## 2023-06-30 RX ORDER — FINASTERIDE 5 MG/1
1 TABLET, FILM COATED ORAL DAILY
Qty: 90 TABLET | Refills: 1 | Status: SHIPPED | OUTPATIENT
Start: 2023-06-30 | End: 2023-10-10

## 2023-06-30 RX ORDER — INSULIN GLARGINE 100 [IU]/ML
35 INJECTION, SOLUTION SUBCUTANEOUS DAILY
Qty: 30 ML | Refills: 0 | Status: SHIPPED | OUTPATIENT
Start: 2023-06-30 | End: 2023-09-06

## 2023-06-30 ASSESSMENT — ENCOUNTER SYMPTOMS
CONSTIPATION: 0
DIARRHEA: 1
NERVOUS/ANXIOUS: 0
MYALGIAS: 0
NAUSEA: 1
HEARTBURN: 0
ABDOMINAL PAIN: 0
HEMATOCHEZIA: 0
SORE THROAT: 0
FEVER: 0
HEMATURIA: 0
SHORTNESS OF BREATH: 0
JOINT SWELLING: 0
DIZZINESS: 0
PALPITATIONS: 0
FREQUENCY: 0
DYSURIA: 0
COUGH: 0
WEAKNESS: 0
ARTHRALGIAS: 0
CHILLS: 0
PARESTHESIAS: 0
HEADACHES: 0
EYE PAIN: 0

## 2023-06-30 ASSESSMENT — ANXIETY QUESTIONNAIRES
GAD7 TOTAL SCORE: 3
1. FEELING NERVOUS, ANXIOUS, OR ON EDGE: SEVERAL DAYS
2. NOT BEING ABLE TO STOP OR CONTROL WORRYING: NOT AT ALL
4. TROUBLE RELAXING: NOT AT ALL
3. WORRYING TOO MUCH ABOUT DIFFERENT THINGS: NOT AT ALL
IF YOU CHECKED OFF ANY PROBLEMS ON THIS QUESTIONNAIRE, HOW DIFFICULT HAVE THESE PROBLEMS MADE IT FOR YOU TO DO YOUR WORK, TAKE CARE OF THINGS AT HOME, OR GET ALONG WITH OTHER PEOPLE: NOT DIFFICULT AT ALL
5. BEING SO RESTLESS THAT IT IS HARD TO SIT STILL: NOT AT ALL
6. BECOMING EASILY ANNOYED OR IRRITABLE: MORE THAN HALF THE DAYS
7. FEELING AFRAID AS IF SOMETHING AWFUL MIGHT HAPPEN: NOT AT ALL
GAD7 TOTAL SCORE: 3
7. FEELING AFRAID AS IF SOMETHING AWFUL MIGHT HAPPEN: NOT AT ALL
GAD7 TOTAL SCORE: 3
8. IF YOU CHECKED OFF ANY PROBLEMS, HOW DIFFICULT HAVE THESE MADE IT FOR YOU TO DO YOUR WORK, TAKE CARE OF THINGS AT HOME, OR GET ALONG WITH OTHER PEOPLE?: NOT DIFFICULT AT ALL

## 2023-06-30 ASSESSMENT — PATIENT HEALTH QUESTIONNAIRE - PHQ9
SUM OF ALL RESPONSES TO PHQ QUESTIONS 1-9: 7
SUM OF ALL RESPONSES TO PHQ QUESTIONS 1-9: 7
10. IF YOU CHECKED OFF ANY PROBLEMS, HOW DIFFICULT HAVE THESE PROBLEMS MADE IT FOR YOU TO DO YOUR WORK, TAKE CARE OF THINGS AT HOME, OR GET ALONG WITH OTHER PEOPLE: SOMEWHAT DIFFICULT

## 2023-06-30 ASSESSMENT — PAIN SCALES - GENERAL: PAINLEVEL: NO PAIN (0)

## 2023-06-30 ASSESSMENT — ACTIVITIES OF DAILY LIVING (ADL): CURRENT_FUNCTION: NO ASSISTANCE NEEDED

## 2023-06-30 NOTE — Clinical Note
Please abstract the following data from this visit with this patient into the appropriate field in Epic:  Tests that can be patient reported without a hard copy:  Eye exam with ophthalmology on this date: 3/2023 Exam Location: [facility] in [place]. Normal exam

## 2023-06-30 NOTE — PROGRESS NOTES
"Answers for HPI/ROS submitted by the patient on 6/30/2023  If you checked off any problems, how difficult have these problems made it for you to do your work, take care of things at home, or get along with other people?: Somewhat difficult  PHQ9 TOTAL SCORE: 7  JOSE MANUEL 7 TOTAL SCORE: 3    SUBJECTIVE:   Caio is a 63 year old who presents for Preventive Visit.      5/3/2023     1:36 PM   Additional Questions   Roomed by Marc Lord CMA     Are you in the first 12 months of your Medicare coverage?  No    Healthy Habits:     In general, how would you rate your overall health?  Fair    Frequency of exercise:  None    Do you usually eat at least 4 servings of fruit and vegetables a day, include whole grains    & fiber and avoid regularly eating high fat or \"junk\" foods?  No    Taking medications regularly:  No    Barriers to taking medications:  Problems remembering to take them    Medication side effects:  Other    Ability to successfully perform activities of daily living:  No assistance needed    Home Safety:  No safety concerns identified    Hearing Impairment:  No hearing concerns    In the past 6 months, have you been bothered by leaking of urine?  No    In general, how would you rate your overall mental or emotional health?  Fair    Additional concerns today:  Yes        Have you ever done Advance Care Planning? (For example, a Health Directive, POLST, or a discussion with a medical provider or your loved ones about your wishes): No, advance care planning information given to patient to review.  Patient plans to discuss their wishes with loved ones or provider.         Fall risk  Fallen 2 or more times in the past year?: Yes  Any fall with injury in the past year?: No    Cognitive Screening   1) Repeat 3 items (Leader, Season, Table)    2) Clock draw: NORMAL  3) 3 item recall: Recalls 2 objects   Results: NORMAL clock, 1-2 items recalled: COGNITIVE IMPAIRMENT LESS LIKELY    Mini-CogTM Copyright S Tushar. Licensed by " the author for use in [facility]; reprinted with permission ([email or web address]). All rights reserved.      Do you have sleep apnea, excessive snoring or daytime drowsiness?: yes    Reviewed and updated as needed this visit by clinical staff                  Reviewed and updated as needed this visit by Provider                 Social History     Tobacco Use     Smoking status: Never     Smokeless tobacco: Never   Substance Use Topics     Alcohol use: Yes     Comment: Occassionally             6/25/2023    11:13 AM   Alcohol Use   Prescreen: >3 drinks/day or >7 drinks/week? Not Applicable     Do you have a current opioid prescription? Yes   How severe is your pain on a scale from 1-10? 1/10         Do you use any other controlled substances or medications that are not prescribed by a provider? None          CHEST PAIN     Onset: months    Description:   Location:  left side  Character: sharp  Radiation: on left side  Duration: 3-5 minutes     Intensity: moderate    Progression of Symptoms:  intermittent    Accompanying Signs & Symptoms:  Shortness of breath: no  Sweating: no  Nausea/vomiting: YES- nausea  Lightheadedness: no  Palpitations: No  Fever/Chills: no  Cough: no  Heartburn: no    History:   Family history of heart disease YES  Tobacco use: no    Precipitating factors:   Worse with exertion: YES  Worse with deep breaths :  no  Related to food: no    Alleviating factors:         Therapies Tried and outcome: had normal nuclear medicine stress test that was overall normal.     Refill on finasteride. Working well for symptoms.     Current providers sharing in care for this patient include:   Patient Care Team:  Lisa Pierre PA-C as PCP - General (Physician Assistant)  Lucía Chávez RD as Diabetes Educator (Dietitian, Registered)  Lisa Pierre PA-C as Assigned PCP  Veronica Chavis LICSW as Therapist ( - Clinical)  Cindi Meraz MD as Assigned Endocrinology  Provider  Jasmyn Argueta PA-C as Assigned Sleep Provider  Rasta [facility]ger [facility] as Diabetes Educator (Dietitian, Registered)  Yudith Vigil, RN as Personal Advocate & Liaison (PAL) (Nurse)  Anant Pinto MD as Assigned Behavioral Health Provider  Haylie Grullon RPH as Pharmacist (Pharmacist)  Haylie Grullon RPH as Assigned MT Pharmacist    The following health maintenance items are reviewed in Epic and correct as of today:  Health Maintenance   Topic Date Due     URINE DRUG SCREEN  Never done     AORTIC ANEURYSM SCREENING (SYSTEM ASSIGNED)  Never done     COVID-19 Vaccine (5 - Additional dose for Vi series) 02/28/2023     EYE EXAM  05/17/2023     ANNUAL REVIEW OF HM ORDERS  06/15/2023     MEDICARE ANNUAL WELLNESS VISIT  06/15/2023     A1C  08/02/2023     PHQ-9  12/30/2023     HEMOGLOBIN  01/11/2024     ALT  01/27/2024     LIPID  01/27/2024     TSH W/FREE T4 REFLEX  01/27/2024     MICROALBUMIN  02/06/2024     DIABETIC FOOT EXAM  02/15/2024     BMP  04/24/2024     CREATININE  05/02/2024     FALL RISK ASSESSMENT  06/30/2024     COLORECTAL CANCER SCREENING  10/21/2024     ADVANCE CARE PLANNING  12/30/2027     DTAP/TDAP/TD IMMUNIZATION (4 - Td or Tdap) 09/24/2031     HEPATITIS C SCREENING  Completed     DEPRESSION ACTION PLAN  Completed     INFLUENZA VACCINE  Completed     Pneumococcal Vaccine: 65+ Years  Completed     URINALYSIS  Completed     ZOSTER IMMUNIZATION  Completed     HIV SCREENING  Addressed     IPV IMMUNIZATION  Aged Out     MENINGITIS IMMUNIZATION  Aged Out       Due for covid vaccine.         Review of Systems   Constitutional: Negative for chills and fever.   HENT: Negative for congestion, ear pain, hearing loss and sore throat.    Eyes: Negative for pain and visual disturbance.   Respiratory: Negative for cough and shortness of breath.    Cardiovascular: Positive for chest pain. Negative for palpitations and peripheral edema.  "  Gastrointestinal: Positive for diarrhea and nausea. Negative for abdominal pain, constipation, heartburn and hematochezia.   Genitourinary: Positive for impotence. Negative for dysuria, frequency, genital sores, hematuria, penile discharge and urgency.   Musculoskeletal: Negative for arthralgias, joint swelling and myalgias.   Skin: Negative for rash.   Neurological: Negative for dizziness, weakness, headaches and paresthesias.   Psychiatric/Behavioral: Positive for mood changes. The patient is not nervous/anxious.          OBJECTIVE:   /58 (BP Location: Right arm, Patient Position: Sitting)   Pulse 82   Temp 98.3  F (36.8  C) (Oral)   Ht 1.651 m (5' 5\")   Wt 108.8 kg (239 lb 12.8 oz)   SpO2 98%   BMI 39.90 kg/m   Estimated body mass index is 39.47 kg/m  as calculated from the following:    Height as of 5/4/23: 1.651 m (5' 5\").    Weight as of 5/4/23: 107.6 kg (237 lb 3.2 oz).  Physical Exam  GENERAL APPEARANCE: healthy, alert and no distress  RESP: lungs clear to auscultation - no rales, rhonchi or wheezes  CV: regular rates and rhythm, normal S1 S2, no S3 or S4 and no murmur, click or rub  ABDOMEN: soft, nontender, without hepatosplenomegaly or masses and bowel sounds normal  PSYCH: mentation appears normal and affect normal/bright        ASSESSMENT / PLAN:   (Z00.00) Medicare annual wellness visit, subsequent  (primary encounter diagnosis)  Comment:   Plan: [place] ANNUAL WELLNESS VISIT, PPS, SUBSEQUENT,         Abdominal Aortic Aneurysm Screening/Tracking            (R07.89) Other chest pain  Comment: lexiscan showed no ischemia EF 66%  Suspect GERD symptoms/hiatal hernia (known in history)  Will restart pantoprazole daily. If symptoms persist, call RN PAL and then will refer to Cardiology for consult.  If symptoms improve, continue PPI.   If symptoms suddenly worsen, 911 or ER. The patient indicates understanding of these issues and agrees with the plan.    Plan: OFFICE/OUTPT VISIT,ESTJERALD IV        "     (N40.1,  R39.11) Benign prostatic hyperplasia with urinary hesitancy  Comment:   Plan: finasteride (PROSCAR) 5 MG tablet        Refilled.     (T14.8XXA) Skin abrasion  Comment:   Plan: mupirocin (BACTROBAN) 2 % external ointment        Use as needed       Patient has been advised of split billing requirements and indicates understanding: No      COUNSELING:  Reviewed preventive health counseling, as reflected in patient instructions       Consider AAA screening for ages 65-75 and smoking history       Regular exercise       Healthy diet/nutrition       Vision screening       Hearing screening       Dental care       Bladder control       Immunizations    Vaccinated for: Covid-19              He reports that he has never smoked. He has never used smokeless tobacco.      Appropriate preventive services were discussed with this patient, including applicable screening as appropriate for cardiovascular disease, diabetes, osteopenia/osteoporosis, and glaucoma.  As appropriate for age/gender, discussed screening for colorectal cancer, prostate cancer, breast cancer, and cervical cancer. Checklist reviewing preventive services available has been given to the patient.    Reviewed patients plan of care and provided an AVS. The Intermediate Care Plan ( asthma action plan, low back pain action plan, and migraine action plan) for Steve meets the Care Plan requirement. This Care Plan has been established and reviewed with the Patient.          ANASTASIA Hendrix [facility]    Identified Health Risks:  I have reviewed Opioid Use Disorder and Substance Use Disorder risk factors and made any needed referrals. I have reviewed the current pain treatment plan including non-opioid treatment options.

## 2023-07-06 ENCOUNTER — VIRTUAL VISIT (OUTPATIENT)
Dept: PHARMACY | Facility: CLINIC | Age: 63
End: 2023-07-06
Payer: COMMERCIAL

## 2023-07-06 DIAGNOSIS — F33.9 MDD (MAJOR DEPRESSIVE DISORDER), RECURRENT EPISODE (H): Primary | ICD-10-CM

## 2023-07-06 PROCEDURE — 99207 PR NO CHARGE LOS: CPT | Mod: VID | Performed by: PHARMACIST

## 2023-07-06 NOTE — PROGRESS NOTES
Medication Therapy Management (MTM) Encounter    ASSESSMENT:                            Medication Adherence/Access: No issues identified    Depression: Patient has experienced some mild improvement in symptoms since starting Remeron 1 month ago. PHQ9 scores have improved as well. He is tolerating it well and does hope for continued improvement in symptoms. We discussed option of increasing dose today vs continuing current dose for another 3 weeks until he meets again with Dr. Pinto. Patient opts to continue current dose. May be reasonable to increase in the future if needed.        PLAN:                            1. No medication changes today.   2. Follow-up with Dr. Pinto 7/27/23. I will be out on leave Aug-Nov; if you have questions or would like a pharmacy visit during that time feel free to send me a [employer] message -  my colleague, Shamika Topete will be covering my leave.     SUBJECTIVE/OBJECTIVE:                          Caio Morgan is a 63 year old male contacted via secure video for a follow-up visit.  Today's visit is a follow-up MTM visit from 6/1/23     Reason for visit: depression; remeron follow-up.    Allergies/ADRs: Reviewed in chart  Past Medical History: Reviewed in chart  Tobacco: He reports that he has never smoked. He has never used smokeless tobacco.  Alcohol: not currently using    Medication Adherence/Access: no issues reported    Depression:   Current medications:   Wellbutrin XL 300mg daily  Mirtazapine 15mg nightly    Steve is seen at [facility] by Anant Pinto. Most recent visit was 5/25/23 at which time remeron was started. Please see note by pt's provider for additional details regarding symptoms and history.     Psychiatric history: Patient was hospitalized in Dec 2022 for 2 weeks then transitional care unit for 2 weeks due to low sodium and high potassium. Due to this his citalopram and Wellbutrin were discontinued . He has been  working with nephrologist since. After stopping citalopram and Wellbutrin he reports worsening mood, irritability. PCP restarted Wellbutrin, but it hasn't been fully effective; max dose has been 300mg daily.    Today, patient reports mild improvement in mood symptoms since starting Remeron about 1 month ago. He reports some improvement in temper, anger, and irritability; but these symptoms still persist and he wishes there was more improvement.  In regard to side effects he reports weird/vivid dreams, some daytime fatigue but this is overall improved compared to last visit. He feels his symptoms are overall the same as when he was on citalopram + Wellbutrin.     Last Comprehensive Metabolic Panel:  Sodium   Date Value Ref Range Status   04/24/2023 134 (L) 136 - 145 mmol/L Final   03/05/2020 130 (L) 133 - 144 mmol/L Final     Potassium   Date Value Ref Range Status   05/02/2023 5.2 3.4 - 5.3 mmol/L Final   09/24/2021 4.8 3.4 - 5.3 mmol/L Final   01/16/2020 4.7 3.4 - 5.3 mmol/L Final     Chloride   Date Value Ref Range Status   04/24/2023 103 98 - 107 mmol/L Final   09/24/2021 99 94 - 109 mmol/L Final   01/16/2020 93 (L) 94 - 109 mmol/L Final     Carbon Dioxide   Date Value Ref Range Status   01/16/2020 26 20 - 32 mmol/L Final     Carbon Dioxide (CO2)   Date Value Ref Range Status   04/24/2023 20 (L) 22 - 29 mmol/L Final   09/24/2021 26 20 - 32 mmol/L Final     Anion Gap   Date Value Ref Range Status   04/24/2023 11 7 - 15 mmol/L Final   09/24/2021 4 3 - 14 mmol/L Final   01/16/2020 6 3 - 14 mmol/L Final     Glucose   Date Value Ref Range Status   04/24/2023 218 (H) 70 - 99 mg/dL Final   09/24/2021 138 (H) 70 - 99 mg/dL Final   01/16/2020 135 (H) 70 - 99 mg/dL Final     Comment:     Fasting specimen     GLUCOSE BY METER POCT   Date Value Ref Range Status   01/09/2023 196 (H) 70 - 99 mg/dL Final     Urea Nitrogen   Date Value Ref Range Status   04/24/2023 25.5 (H) 8.0 - 23.0 mg/dL Final   09/24/2021 19 7 - 30 mg/dL  Final   01/16/2020 13 7 - 30 mg/dL Final     Creatinine   Date Value Ref Range Status   05/02/2023 1.36 (H) 0.67 - 1.17 mg/dL Final   01/16/2020 1.01 0.66 - 1.25 mg/dL Final     GFR Estimate   Date Value Ref Range Status   05/02/2023 58 (L) >60 mL/min/1.73m2 Final     Comment:     eGFR calculated using 2021 CKD-EPI equation.   01/16/2020 79 >60 mL/min/[1.73_m2] Final     Comment:     Non  GFR Calc  Starting 12/18/2018, serum creatinine based estimated GFR (eGFR) will be   calculated using the Chronic Kidney Disease Epidemiology Collaboration   (CKD-EPI) equation.       Calcium   Date Value Ref Range Status   04/24/2023 9.6 8.8 - 10.2 mg/dL Final   01/16/2020 9.0 8.5 - 10.1 mg/dL Final     Bilirubin Total   Date Value Ref Range Status   12/22/2022 0.8 <=1.2 mg/dL Final   01/16/2020 0.4 0.2 - 1.3 mg/dL Final     Alkaline Phosphatase   Date Value Ref Range Status   12/22/2022 115 40 - 129 U/L Final   01/16/2020 76 40 - 150 U/L Final     ALT   Date Value Ref Range Status   01/27/2023 21 10 - 50 U/L Final   01/16/2020 34 0 - 70 U/L Final     AST   Date Value Ref Range Status   01/11/2023 20 10 - 50 U/L Final   01/16/2020 24 0 - 45 U/L Final             5/25/2023     1:19 PM 6/8/2023    12:46 PM 6/30/2023     8:20 AM   PHQ   PHQ-9 Total Score 16 18 7   Q9: Thoughts of better off dead/self-harm past 2 weeks Not at all Not at all Not at all         1/27/2023    10:03 AM 3/10/2023     1:48 PM 6/30/2023     8:21 AM   JOSE MANUEL-7 SCORE   Total Score 2 (minimal anxiety) 5 (mild anxiety) 3 (minimal anxiety)   Total Score 2 5 3     ----------------      I spent 20 minutes with this patient today. A copy of the visit note was provided to the patient's provider(s).    A summary of these recommendations was sent via [employer].    Haylie Grullon PharmD, BCPP  Medication Therapy Management Pharmacist  [facility]    Telemedicine Visit Details  Type of service:  Video Conference via  [employer]  Start Time: 1:04 PM  End Time: 1:23 PM     Medication Therapy Recommendations  No medication therapy recommendations to display

## 2023-07-06 NOTE — PATIENT INSTRUCTIONS
"Recommendations from today's MTM visit:                                                      1. No medication changes today.   2. Follow-up with Dr. Pinto 7/27/23. I will be out on leave Aug-Nov; if you have questions or would like a pharmacy visit during that time feel free to send me a [employer] message -  my colleague, Shamika Topete will be covering my leave.     It was great speaking with you today.  I value your experience and would be very thankful for your time in providing feedback in our clinic survey. In the next few days, you may receive an email or text message from [email or web address] with a link to a survey related to your  clinical pharmacist.\"     To schedule another MTM appointment, please call the clinic directly or you may call the MTM scheduling line at [phone] or toll-free at [phone].     My Clinical Pharmacist's contact information:                                                      Please feel free to contact me with any questions or concerns you have.      Haylie Grullon, PharmD, BCPP  Medication Therapy Management Pharmacist  [facility]     "

## 2023-07-06 NOTE — Clinical Note
Hello - I had a follow-up visit with patient - overall some mild improvement in temper/depression since starting remeron. He is tolerating it well and wanted to give it another couple weeks to see if he gets additional benefit before increasing the dose. You are scheduled to see him 7/27.   Thank you!  Haylie Grullon, PharmD, BCPP Medication Therapy Management Pharmacist [facility]

## 2023-07-10 ENCOUNTER — PATIENT OUTREACH (OUTPATIENT)
Dept: FAMILY MEDICINE | Facility: CLINIC | Age: 63
End: 2023-07-10
Payer: MEDICARE
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
## 2023-07-11 ENCOUNTER — ANCILLARY PROCEDURE (OUTPATIENT)
Dept: GENERAL RADIOLOGY | Facility: CLINIC | Age: 63
End: 2023-07-11
Attending: PHYSICIAN ASSISTANT
Payer: MEDICARE

## 2023-07-11 ENCOUNTER — OFFICE VISIT (OUTPATIENT)
Dept: FAMILY MEDICINE | Facility: CLINIC | Age: 63
End: 2023-07-11
Payer: MEDICARE

## 2023-07-11 ENCOUNTER — TELEPHONE (OUTPATIENT)
Dept: WOUND CARE | Facility: CLINIC | Age: 63
End: 2023-07-11

## 2023-07-11 VITALS
WEIGHT: 234 LBS | SYSTOLIC BLOOD PRESSURE: 132 MMHG | DIASTOLIC BLOOD PRESSURE: 76 MMHG | BODY MASS INDEX: 38.94 KG/M2 | RESPIRATION RATE: 16 BRPM | HEART RATE: 78 BPM | TEMPERATURE: 97.4 F | OXYGEN SATURATION: 99 %

## 2023-07-11 DIAGNOSIS — T23.201A PARTIAL THICKNESS BURN OF MULTIPLE SITES OF RIGHT HAND, INITIAL ENCOUNTER: ICD-10-CM

## 2023-07-11 DIAGNOSIS — M25.572 PAIN IN JOINT, ANKLE AND FOOT, LEFT: Primary | ICD-10-CM

## 2023-07-11 PROCEDURE — 99214 OFFICE O/P EST MOD 30 MIN: CPT | Performed by: PHYSICIAN ASSISTANT

## 2023-07-11 PROCEDURE — 73630 X-RAY EXAM OF FOOT: CPT | Mod: TC | Performed by: RADIOLOGY

## 2023-07-11 PROCEDURE — 73610 X-RAY EXAM OF ANKLE: CPT | Mod: TC | Performed by: RADIOLOGY

## 2023-07-11 ASSESSMENT — PAIN SCALES - GENERAL: PAINLEVEL: MILD PAIN (2)
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
## 2023-07-24 ENCOUNTER — MYC REFILL (OUTPATIENT)
Dept: PSYCHIATRY | Facility: CLINIC | Age: 63
End: 2023-07-24
Payer: MEDICARE

## 2023-07-24 DIAGNOSIS — F33.1 MAJOR DEPRESSIVE DISORDER, RECURRENT EPISODE, MODERATE (H): ICD-10-CM

## 2023-07-25 RX ORDER — MIRTAZAPINE 15 MG/1
TABLET, FILM COATED ORAL
Qty: 30 TABLET | Refills: 0 | Status: SHIPPED | OUTPATIENT
Start: 2023-07-25 | End: 2023-07-27

## 2023-07-27 ENCOUNTER — VIRTUAL VISIT (OUTPATIENT)
Dept: PSYCHIATRY | Facility: CLINIC | Age: 63
End: 2023-07-27
Payer: MEDICARE

## 2023-07-27 DIAGNOSIS — F33.1 MAJOR DEPRESSIVE DISORDER, RECURRENT EPISODE, MODERATE (H): Primary | ICD-10-CM

## 2023-07-27 PROCEDURE — 99214 OFFICE O/P EST MOD 30 MIN: CPT | Mod: VID | Performed by: PSYCHIATRY & NEUROLOGY

## 2023-07-27 RX ORDER — MIRTAZAPINE 30 MG/1
30 TABLET, FILM COATED ORAL AT BEDTIME
Qty: 30 TABLET | Refills: 1 | Status: SHIPPED | OUTPATIENT
Start: 2023-07-27 | End: 2023-08-03

## 2023-07-27 ASSESSMENT — PAIN SCALES - GENERAL: PAINLEVEL: NO PAIN (0)

## 2023-07-27 NOTE — PROGRESS NOTES
Virtual Visit Details    Type of service:  Video Visit   Video Start Time: 2:08 PM  Video End Time:2:22 PM    Originating Location (pt. Location): Home    Distant Location (provider location):  Off-site  Platform used for Video Visit: [facility] Psychiatry Consult Note    IDENTIFICATION   Name: Steve Morgan   : 1960/63 year old      Sex:    @ male          Telemedicine Visit: The patient's condition can be safely assessed and treated via synchronous audio and visual telemedicine encounter.        Face to Face/patient Contact total time: 14 minutes  Pre Charting time: 0 minutes; Post charting time, communication and other activities: 1 minutes; Total time 15 minutes  2:08 PM          SUBJECTIVE   Reports mental health is about the same. Gets upset easily. Continues to lack motivation and energy. He discussed with PharmD raising mirtazapine and decided to wait. Anger is slightly better. Having weird dreams, not problematic as side effect.         PHQ-9 scores:      2023     1:19 PM 2023    12:46 PM 2023     8:20 AM   PHQ-9 SCORE   PHQ-9 Total Score MyChart 16 (Moderately severe depression) 18 (Moderately severe depression) 7 (Mild depression)   PHQ-9 Total Score 16 18 7       JOSE MANUEL-7 scores:      2023    10:03 AM 3/10/2023     1:48 PM 2023     8:21 AM   JOSE MANUEL-7 SCORE   Total Score 2 (minimal anxiety) 5 (mild anxiety) 3 (minimal anxiety)   Total Score 2 5 3       OBJECTIVE     Vital Signs:   There were no vitals taken for this visit.    Labs:    Current Medications:  Current Outpatient Medications   Medication    acetaminophen (TYLENOL) 325 MG tablet    ASPIRIN 81 MG OR TABS    atenolol (TENORMIN) 25 MG tablet    buPROPion (WELLBUTRIN XL) 300 MG 24 hr tablet    Calcium Carb-Cholecalciferol (CALCIUM 600 + D PO)    Continuous Blood Gluc  (FREESTYLE GIULIANA 2 READER) SIENA    Continuous Blood Gluc Sensor (FREESTYLE GIULIANA 2 SENSOR) MISC    Cyanocobalamin (VITAMIN B 12  PO)    ferrous sulfate 325 (65 FE) MG tablet    finasteride (PROSCAR) 5 MG tablet    glipiZIDE (GLUCOTROL XL) 10 MG 24 hr tablet    ibuprofen (ADVIL/MOTRIN) 200 MG tablet    insulin glargine (BASAGLAR KWIKPEN) 100 UNIT/ML pen    latanoprost (XALATAN) 0.005 % ophthalmic solution    LEVOXYL 137 MCG tablet    losartan (COZAAR) 100 MG tablet    metFORMIN (GLUCOPHAGE) 1000 MG tablet    mirtazapine (REMERON) 15 MG tablet    MULTI-VITAMIN OR TABS    mupirocin (BACTROBAN) 2 % external ointment    pantoprazole (PROTONIX) 40 MG EC tablet    simvastatin (ZOCOR) 20 MG tablet    Urea (URE-NA) 15 g PACK packet    VITAMIN D, CHOLECALCIFEROL, PO     No current facility-administered medications for this visit.        The [place] Prescription Monitoring Program has been reviewed and there are no concerns about diversionary activity for controlled substances at this time.        ADDED HISTORY   Recalls wellbutrin did actually work and was much more of a struggle without.       MENTAL STATUS EXAMINATION:   Appearance: intact attention to grooming and hygiene  Attitude:  cooperative   Eye Contact:  good  Gait and Station: sitting  Psychomotor Behavior:  reduced  Oriented to:  Grossly person place and time  Attention Span and Concentration:  Grossly intact  Speech:  Depressed tone  Language: english  Mood:  sad   Affect:  constricted  Associations:  no loose associations  Thought Process:  logical, linear and goal oriented  Thought Content:  No evidence of delusions or suicidal or homicidal ideation plan or intent  Memory: grossly intact  Fund of Knowledge: Good  Insight:  good  Judgment:  intact, adequate for safety  Impulse Control:  intact        DIAGNOSES:   Major Depressive Disorder, moderate, recurrent  SIADH/hyponatremia      ASSESSMENT:   Patient with history of depression, and considerable mood difficulties without prior combo of citalopram/bupropion affecting relationships negatively. Has comorbid diagnoses including  SIADH/hyponatermia (possibly worse with selective serotonin reuptake inhibitor) historically, DM, obstructive sleep apnea, b12 deficiency.     Per literature review mirtazapine is less likely to cause hyponatremia, and for current eGFR no dosage necessary. Will pursue.    Today Steve Morgan reports no suicidal ideations. In addition, he has notable risk factors for self-harm, including age and comorbid medical conditions. However, risk is mitigated by commitment to family, Faith beliefs and absence of past attempts. Therefore, based on all available evidence including the factors cited above, he does not appear to be at imminent risk for self-harm, does not meet criteria for a 72-hr hold, and therefore remains appropriate for ongoing outpatient level of care.       PLAN:     Patient advised of consultative model. Patient will continue to be seen for ongoing consultation and stabilization.  Does not meet criteria for involuntary treatment or hospitalization  Add mirtazapine 5/25/23 7.5 mg po at bedtime x 3 nights then 15 mg po at bedtime slight benefit for anger=> 7/27/23 30 mg qhs-Risks, benefits and alternatives discussed.  Patient provides verbal consent to treatment.  Continue bupropion 150 mg po bid -Risks, benefits and alternatives discussed.  Patient provides verbal consent to treatment.  DC'd citalopram (hyponatremia/SIADH)  Labs - reviewed; follow-up BMP, hyponatremia  Referred for MTM referral, priority on guidance for psychotropic selection and risk of hyponatremia  Therapy with Veronica GUILLAUME  Return in 4-8 weeks    Administrative Billing:   Time spent with patient was greater than 50% of time and/or significant time was spent in counseling and coordination of care regarding above diagnoses and treatment plan. Pre charting time and post charting time/documentation/coordination are done on date of service.      Signed:   Anant Pinto M.D.  [facility] Psychiatry Service    Disclaimer: This note  consists of symbols derived from keyboarding, dictation and/or voice recognition software. As a result, there may be errors in the script that have gone undetected. Please consider this when interpreting information found in this chart.

## 2023-07-27 NOTE — NURSING NOTE
Is the patient currently in the state of [place]? YES    Visit mode:VIDEO    If the visit is dropped, the patient can be reconnected by: VIDEO VISIT: Text to cell phone: [phone]    Will anyone else be joining the visit? NO      How would you like to obtain your AVS? MyChart    Are changes needed to the allergy or medication list? NO    Reason for visit: RECHECK

## 2023-08-01 ENCOUNTER — TRANSFERRED RECORDS (OUTPATIENT)
Dept: HEALTH INFORMATION MANAGEMENT | Facility: CLINIC | Age: 63
End: 2023-08-01
Payer: MEDICARE

## 2023-08-01 LAB — RETINOPATHY: NEGATIVE

## 2023-08-02 ENCOUNTER — THERAPY VISIT (OUTPATIENT)
Dept: PHYSICAL THERAPY | Facility: CLINIC | Age: 63
End: 2023-08-02
Attending: PHYSICIAN ASSISTANT
Payer: MEDICARE

## 2023-08-02 DIAGNOSIS — Z78.9 DEFICIT IN ACTIVITIES OF DAILY LIVING (ADL): Primary | ICD-10-CM

## 2023-08-02 DIAGNOSIS — M62.81 GENERALIZED MUSCLE WEAKNESS: ICD-10-CM

## 2023-08-02 PROCEDURE — 97112 NEUROMUSCULAR REEDUCATION: CPT | Mod: GP | Performed by: PHYSICAL THERAPIST

## 2023-08-02 PROCEDURE — 97750 PHYSICAL PERFORMANCE TEST: CPT | Mod: GP | Performed by: PHYSICAL THERAPIST

## 2023-08-02 NOTE — PROGRESS NOTES
08/02/23 1100   Signing Clinician's Name / Credentials   Signing clinician's name / credentials Kinjal Nunn PT   Palomo Balance Scale (PALOMO PETERSON, VIJAYA S, JENNIFER GRISSOM, RODOLFO GRAVES: MEASURING BALANCE IN THE ELDERLY: VALIDATION OF AN INSTRUMENT. CAN. J. PUB. HEALTH, JULY/AUGUST SUPPLEMENT 2:S7-11, 1992.)   Sit To Stand 4   Standing Unsupported 4   Sitting Unsupported 4   Stand to Sit 4   Transfers 4   Standing with Eyes Closed 3   Standing Unsupported, Feet Together 1   Reach Forward With Outstretched Arm 3   Retrieve Object From Floor 3   Turning to Look Behind 1   Turn 360 Degrees 3   Placing Alternate Foot on Stool (4-6 inches) 2   Unsupported Tandem Stand (Demonstrate to Subject) 1   One Leg Stand 1   Total Score (A score of 45 or less has been correlated with an increased risk of falls)   Total Score (out of 56) 38     Palomo Balance Scale (BBS) Cutoff Scores: A score of < 45 /56 indicates an increased risk for falls.     The BBS is a measure of static and dynamic standing balance that has been validated in community dwelling elderly individuals and individuals who have Parkinson's Disease, MS, and those who are s/p CVA and TBI. The test is administered without an assistive device. Scores from the Palomo are used to determine the probability of falling based on the patient's previous history of falls and their test performance.     Minimal Detectable Change = 6.5   & Minimal Detectable Change (Parkinson's Disease) = 5 according to Jan & Heberey 2008    Assessment (rationale for performing, application to patient s function & care plan): assessment of progress and falls  (Minutes billed as physical performance test) 15

## 2023-08-03 ENCOUNTER — MYC REFILL (OUTPATIENT)
Dept: PSYCHIATRY | Facility: CLINIC | Age: 63
End: 2023-08-03
Payer: MEDICARE

## 2023-08-03 DIAGNOSIS — F33.1 MAJOR DEPRESSIVE DISORDER, RECURRENT EPISODE, MODERATE (H): ICD-10-CM

## 2023-08-03 RX ORDER — MIRTAZAPINE 30 MG/1
30 TABLET, FILM COATED ORAL AT BEDTIME
Qty: 30 TABLET | Refills: 1 | Status: SHIPPED | OUTPATIENT
Start: 2023-08-03 | End: 2023-09-07

## 2023-08-03 NOTE — TELEPHONE ENCOUNTER
We may increase the dose; if we can get a 30 day supply recommend so. If insurance absolutely requires 90 day will switch. Likely this is a short term dose anyhow and likely to titrate.

## 2023-08-03 NOTE — CONFIDENTIAL NOTE
[facility] received faxed 90 day supply request for mirtazapine (REMERON) 30 MG tablet          Sherrill Huitron CMA August 3, 2023

## 2023-08-04 ENCOUNTER — TELEPHONE (OUTPATIENT)
Dept: FAMILY MEDICINE | Facility: CLINIC | Age: 63
End: 2023-08-04
Payer: MEDICARE

## 2023-08-04 NOTE — TELEPHONE ENCOUNTER
Patient Quality Outreach    Patient is due for the following:   Diabetes -  Eye Exam    Next Steps:   No follow up needed at this time.    Type of outreach:    Chart review performed, no outreach needed. Patient had diabetic eye exam [facility] 08-      Questions for provider review:    None           Honey Pedraza MA

## 2023-08-04 NOTE — TELEPHONE ENCOUNTER
RN called the pharmacy at [phone]. Spoke with a pharmacy technician. Pharmacy technician reported that a 30 day supply is covered by insurance.     RN attempted to reach patient to let him know that Dr. Pinto wanted a 30 day supply of mirtazapine if possible. LVM encouraging him to call if a 90 day supply is needed still.    ANTONIA HARRIS RN on 8/4/2023 at 12:48 PM

## 2023-08-09 ENCOUNTER — THERAPY VISIT (OUTPATIENT)
Dept: PHYSICAL THERAPY | Facility: CLINIC | Age: 63
End: 2023-08-09
Attending: PHYSICIAN ASSISTANT
Payer: MEDICARE

## 2023-08-09 DIAGNOSIS — Z78.9 DEFICIT IN ACTIVITIES OF DAILY LIVING (ADL): Primary | ICD-10-CM

## 2023-08-09 PROCEDURE — 97110 THERAPEUTIC EXERCISES: CPT | Mod: GP | Performed by: PHYSICAL THERAPIST

## 2023-08-16 ENCOUNTER — THERAPY VISIT (OUTPATIENT)
Dept: PHYSICAL THERAPY | Facility: CLINIC | Age: 63
End: 2023-08-16
Attending: PHYSICIAN ASSISTANT
Payer: MEDICARE

## 2023-08-16 ENCOUNTER — THERAPY VISIT (OUTPATIENT)
Dept: OCCUPATIONAL THERAPY | Facility: CLINIC | Age: 63
End: 2023-08-16
Attending: PHYSICIAN ASSISTANT
Payer: MEDICARE

## 2023-08-16 DIAGNOSIS — M24.549 CONTRACTURE OF HAND: Primary | ICD-10-CM

## 2023-08-16 DIAGNOSIS — Z78.9 DEFICIT IN ACTIVITIES OF DAILY LIVING (ADL): ICD-10-CM

## 2023-08-16 DIAGNOSIS — M62.81 GENERALIZED MUSCLE WEAKNESS: Primary | ICD-10-CM

## 2023-08-16 DIAGNOSIS — Z74.1 SELF-CARE DEFICIT FOR TOILETING: ICD-10-CM

## 2023-08-16 PROCEDURE — 97535 SELF CARE MNGMENT TRAINING: CPT | Mod: GO

## 2023-08-16 PROCEDURE — 97110 THERAPEUTIC EXERCISES: CPT | Mod: GP | Performed by: PHYSICAL THERAPIST

## 2023-08-16 NOTE — PROGRESS NOTES
DISCHARGE  Reason for Discharge: Patient has met all goals.    Equipment Issued: foam block, theraputty, zipper pull, foam for built-up handles (red and blue)    Discharge Plan: Patient to continue home program and is welcome to return to OP OT in the future, as needed, with new referral from his provider.    Referring Provider:  Lisa Pierre        08/16/23 0500   Appointment Info   Treating Provider Enedina Lim OTR/L   Visits Used 8/9 - MEDICARE; [place]   Medical Diagnosis Encounter for long-term (current) use of medications; Deficit in activities of daily living (ADL); Self-care deficit for toileting; Contracture of hand   OT Tx Diagnosis Decreased safety and IND with ADLs/IADLs   Quick Add  Certification   Progress Note/Certification   Start Of Care Date 03/30/23   Onset of Illness/Injury or Date of Surgery 03/10/23  (date of order used)   Therapy Frequency 1x/week   Predicted Duration 12 weeks (anticipated 1-3 sessions in 12 weeks)   Certification date from 06/21/23   Certification date to 09/13/23   Progress Note Due Date 09/13/23   Progress Note Completed Date 06/21/23   Goals   OT Goals 1;2;3;4;5   OT Goal 1   Goal Identifier Fatigue Management   Goal Description Pt will demonstrate 3 energy conservation strategies (e.g. pacing, planning, prioritizing, sleep hygiene, etc.) to facilitate fatigue management with ADL/IADL tasks (e.g. laundry, meal preparation, walking/exercise).   Goal Progress Goal met. Pt educated in fatigue management strategies including planning, prioritizing, pacing, and posture with application to ADLs/IADLs. Additionally, educated in sleep hygiene strategies to further promote fatigue management and overall engagement in ADLs/IADLs. Pt reports subjective improvement in energy following recent improvements in sleep and several weeks on new antidepressant medication. Pt to continue utilizing compensatory strategies prn for energy conservation/work simplification.   Target  Date 06/22/23   Date Met 06/21/23   OT Goal 2   Goal Identifier Hand Strength   Goal Description Pt will demonstrate improved bilateral strength by 10#  strength and 3# pinch strength (lateral and palmar) for improved ADL/IADL completion. (e.g. grasping utensils, opening containers, holding/carrying objects).   Goal Progress Goal progressing but not fully met. Pt trained in HEP using blue foam block, red theraputty, and hand gripper focused on building  and pinch strength for IND with ADLs/IADLs. Pt initially demonstrating good adherence to HEP but reports decreased adherence in recent weeks. Reassessment indicates min improvement in R hand  strength but otherwise stable from time of evaluation. Educated in importance of adherence to hand HEP to further progress skills for IND with ADLs/IADLs with pt reporting agreement and plan to restart consistent completion of HEP activities following discharge today.   Target Date 09/13/23   OT Goal 3   Goal Identifier UE HEP   Goal Description Pt will demonstrate independent completion of 1-3 activity HEP for UE strength/coordination to promote independence with ADLs/IADLs (e.g. retrieving object from cabinet or refrigerator, microwaving food, hanging laundry).   Goal Progress Goal met. Educated and trained in multi-activity HEP to further promote hand strength and coordination for IND with ADLs/IADLs, including: card games (speed), arm exercises (ROM only), blue foam block, red theraputty and finger taps, and hand gripper. Pt to continue with HEP to further progress IND with ADLs/IADLs.   Target Date 09/13/23   OT Goal 4   Goal Identifier Adaptive Equipment/Strategies   Goal Description Patient to verbalize understanding of and demonstrate use of 3 new pieces of adaptive equipment and/or adapted techniques to increase independence and safety with ADL/IADLs (feeding self, dressing, bathing, toileting, functional mobility, meal preparation, etc.)   Goal Progress  Goal met. To promote IND with ADLs/IADLs, educated and trained in use of the following adaptive strategies and equipment: adapted computer mouse, rubber finger for mail, energy conservation strategies, card bishop, memory strategies, zipper pull, button hook, long handled sponge, and reacher. Educated in tremor management strategies, including stabilization (on table, Dycem), weight (on wrist, on utensil), bigger is better (built up handle for handwriting, feeding), and fatigue management (tremors tend to increase with muscle fatigue), as well as adaptive equipment education and demo for various daily activities, such as feeding, meal preparation, and dressing, to name a few.   Target Date 09/13/23   Date Met 08/16/23   OT Goal 5   Goal Identifier Memory/Safety   Goal Description Patient to verbalize understanding of  cognitive screen results and identify 3 strategies to increase patient's safety and independence in the home and community setting.   Goal Progress Goal met. Pt educated in memory compensatory strategies, including lifestyle factors (physical and cognitive exercise, diet, vision/hearing, sleep, socialization, mental health) as well as specific strategies (attention, association, chunking/clustering, visualization, language-based strategies, repetition/rehearsal, organization, and external aids). Additionally, educated in cognitive HEP using speed card game as therapeutic tool.   Target Date 06/22/23   Date Met 06/21/23   Subjective Report   Subjective Report Pt reports that overall he is doing very well. Doesn't have any specific concerns and feels his daily activities are going well. Reports he does have some difficulty with opening/closing Ziplock bags, grasp on certain items, and managing his tremor. Overall reports understanding of recommendations and endorses readiness for discharge at end of today's session.   Objective Measures   Objective Measures Objective Measure 1   Objective Measure 1    Objective Measure MoCA 8.1   Details As of 5/17/23- To further assess cognition, administered the Nain Cognitive Assessment (MoCA), which was designed as a rapid screening instrument for mild cognitive dysfunction with a score of 26/30 considered normal. Administered MoCA 8.1 with pt scoring 24/30 (MIS = 14/15), indicating potential mild cognitive impairment. Pt scored 3/5 on visuospatial/executive, 3/3 on naming, 5/6 on attention, 1/3 on language, 2/2 on abstraction, 4/5 on delayed recall (recalls 4, gets 5th with category cue), and 6/6 on orientation.   Objective Measure 2   Objective Measure /Pinch Strength   Details As of 8/16/23 - R hand: 29#  strength (was 26#) and 5# lateral pinch (same as before). L hand: 31#  strength (was 32#) and 6# lateral pinch (was 5#).   Treatment Interventions (OT)   Interventions Therapeutic Activity;Self Care/Home Management;Therapeutic Procedure/Exercise   Self Care/Home Management   Self-Care/Home Mgmt/ADL, Compensatory, Meal Prep Minutes (76817) 42 Minutes   Self Care 1 Compensatory strategies and adaptive equipment and OT discharge summary   Self Care 1 - Details With about 2 month break since patient's last OT session secondary to scheduling conflicts, facilitated skilled review of progress with subjective report and administration of /pinch assessment - see results above. Educated on results with application to ADLs/IADLs and continuation of HEP. To promote IND with ADLs/IADLs, educated and trained pt using Tremor Management handout with emphasis on stabilization (on table, Dycem), weight (on wrist, on utensil), bigger is better (built up handle for handwriting, feeding), and fatigue management (tremors tend to increase with muscle fatigue), as well as adaptive equipment education and demo for various daily activities, such as feeding, meal preparation, and dressing, to name a few. Issued foam for built-up handles this date (red and blue). To finalize  education and promote carryover to home following discharge, facilitated education in final OT recommendations and HEP, including education in: fatigue management (contributing factors, plan, prioritize, pace, posture), memory strategies (lifestyle factors, specific strategies, cognitive HEP), arm/hand HEP (arm exercises (ROM only), blue foam block, red theraputty and finger taps, hand gripper), and adaptive strategies/equipment (tremor management, zipper pull, button hook, long handled sponge, reacher, built-up handles, weighted utensils). Educated in follow up and referral process for return to OP OT in the future, as needed.   Skilled Intervention Skilled education and training in compensatory strategies, adaptive equipment, and OT discharge summary for IND with I/ADLs   Patient Response/Progress Pt likes idea of weighted utensils and built-up foam handle, appreciates foam samples to try at home. Goals met.   Education   Learner/Method Patient;Listening;Reading;Demonstration;Pictures/Video   Readiness Acceptance   Education Notes see tx details above   Equipment zipper pull, foam block, theraputty   Plan   Home program card games (speed), arm exercises (ROM only), blue foam block, red theraputty and finger taps, hand gripper   Updates to plan of care discharge   Homework adapted computer mouse, rubber finger for mail, ECWS basics, card bishop, memory strategies, AE (zipper pull, button hook, long handled sponge, reacher); tremor management   Total Session Time   Timed Code Treatment Minutes 42   Total Treatment Time (sum of timed and untimed services) 42   Session Number   Authorization status CERT REQUIRED. TELEHEALTH - YES.   Clinical Impression   OT Diagnosis Decreased safety and IND with ADLs/IADLs     Thank you for the referral of this patient.  If you have any questions regarding the information in this report, please feel free to contact me per the information provided below.      Enedina Lim MA,  OTR/L  Occupational Therapist  [address] [address]  Clinic Fax:  [phone]  Clinic Phone: [phone]

## 2023-08-17 ENCOUNTER — VIRTUAL VISIT (OUTPATIENT)
Dept: PSYCHOLOGY | Facility: CLINIC | Age: 63
End: 2023-08-17
Payer: MEDICARE

## 2023-08-17 DIAGNOSIS — F33.2 SEVERE EPISODE OF RECURRENT MAJOR DEPRESSIVE DISORDER, WITHOUT PSYCHOTIC FEATURES (H): Primary | ICD-10-CM

## 2023-08-17 PROCEDURE — 90834 PSYTX W PT 45 MINUTES: CPT | Mod: VID | Performed by: SOCIAL WORKER

## 2023-08-17 ASSESSMENT — PATIENT HEALTH QUESTIONNAIRE - PHQ9
SUM OF ALL RESPONSES TO PHQ QUESTIONS 1-9: 14
SUM OF ALL RESPONSES TO PHQ QUESTIONS 1-9: 14
10. IF YOU CHECKED OFF ANY PROBLEMS, HOW DIFFICULT HAVE THESE PROBLEMS MADE IT FOR YOU TO DO YOUR WORK, TAKE CARE OF THINGS AT HOME, OR GET ALONG WITH OTHER PEOPLE: SOMEWHAT DIFFICULT

## 2023-08-17 NOTE — PROGRESS NOTES
[facility]                                     Progress Note    Patient Name: Steve Morgan  Date: August 17, 2023         Service Type: Individual      Session Start Time: 8:02  Session End Time: 8:54     Session Length: 52    Session #: 4    Attendees: Client attended alone    Service Modality:  Video Visit:      Provider verified identity through the following two step process.  Patient provided:  Patient is known previously to provider    Telemedicine Visit: The patient's condition can be safely assessed and treated via synchronous audio and visual telemedicine encounter.      Reason for Telemedicine Visit: Patient has requested telehealth visit    Originating Site (Patient Location): Patient's home    Distant Site (Provider Location): Provider Remote Setting- Home Office    Consent:  The patient/guardian has verbally consented to: the potential risks and benefits of telemedicine (video visit) versus in person care; bill my insurance or make self-payment for services provided; and responsibility for payment of non-covered services.     Patient would like the video invitation sent by:  My Chart    Mode of Communication:  Video Conference via Amwell    Distant Location (Provider):  Off-site    As the provider I attest to compliance with applicable laws and regulations related to telemedicine.    DATA  Extended Session (53+ minutes): No  Interactive Complexity: No  Crisis: No        Progress Since Last Session (Related to Symptoms / Goals / Homework):   Symptoms: Worsening worsening depression and thoughts of not wanting to be around    Homework: Did not complete      Episode of Care Goals: Achieved / completed to satisfaction - CONTEMPLATION (Considering change and yet undecided); Intervened by assessing the negative and positive thinking (ambivalence) about behavior change  Satisfactory progress - PREPARATION (Decided to change - considering how); Intervened by negotiating a change plan  and determining options / strategies for behavior change, identifying triggers, exploring social supports, and working towards setting a date to begin behavior change     Current / Ongoing Stressors and Concerns:   Struggles with motivation, getting along with wife and sister-in-law and being able to express and deal with emotions in healthy ways.     Treatment Objective(s) Addressed in This Session:   Increase interest, engagement, and pleasure in doing things  Discuss motivation / ambivalence about a referral to specialty mental health services (Therapy, Day Tx, PHP)     Intervention:   ACT: Discussed with patient concerns about engagement in therapy.  Patient discussed feeling like there is no solution and that he felt like things might be better if he is not around.  Patient agreed to engagement in therapy of showing up for appointments.  Patient discussed values as to why he is choosing therapy and to address issues.  Patient and writer reviewed what has happened in therapy up to this point and addressed home practice from that session.  Patient agreed that he would need to do more around the house so that his wife would have time to engage in couple's counseling.  Supported patient to engage in value of support and to practice valued action to help learn what this means.    Anger responses:  Not cleaning or doing other work the house  Stop everything (including therapy)  Watches tv  [place]  Not saying anything    Values: Openness, trustworthy, love, honesty, support    Assessments completed prior to visit:  The following assessments were completed by patient for this visit:  PHQ2:       3/10/2023     1:53 PM 6/12/2022     4:29 PM 3/28/2022    10:47 AM 11/16/2021    10:14 AM 9/23/2021    10:32 AM 9/2/2020     9:26 AM 1/16/2020     9:05 AM   PHQ-2 ( 1999 [employer])   Q1: Little interest or pleasure in doing things  3 0 1 3 3 0   Q2: Feeling down, depressed or hopeless  1 0 1 1 3 0   PHQ-2 Score  4 0 2 4 6 0   PHQ-2  Total Score (12-17 Years)- Positive if 3 or more points; Administer PHQ-A if positive     4 6 0   Q1: Little interest or pleasure in doing things  Nearly every day  Several days Nearly every day Nearly every day    Q2: Feeling down, depressed or hopeless  Several days  Several days Several days Nearly every day    PHQ-2 Score Incomplete 4  2 4 6      PHQ9:       1/27/2023    10:02 AM 3/10/2023     1:47 PM 5/17/2023     7:48 AM 5/25/2023     1:19 PM 6/8/2023    12:46 PM 6/30/2023     8:20 AM 8/17/2023     7:46 AM   PHQ-9 SCORE   PHQ-9 Total Score MyChart 6 (Mild depression) 10 (Moderate depression) 14 (Moderate depression) 16 (Moderately severe depression) 18 (Moderately severe depression) 7 (Mild depression) 14 (Moderate depression)   PHQ-9 Total Score 6 10 14 16 18 7 14     PROMIS 10-Global Health (all questions and answers displayed):       10/27/2022    12:43 PM 5/15/2023    10:44 AM 5/21/2023     4:35 PM   PROMIS 10   In general, would you say your health is: Fair Fair Fair   In general, would you say your quality of life is: Good Good Very good   In general, how would you rate your physical health? Poor Fair Poor   In general, how would you rate your mental health, including your mood and your ability to think? Good Fair Fair   In general, how would you rate your satisfaction with your social activities and relationships? Good Fair Fair   In general, please rate how well you carry out your usual social activities and roles Fair Good Fair   To what extent are you able to carry out your everyday physical activities such as walking, climbing stairs, carrying groceries, or moving a chair? Mostly Moderately Mostly   In the past 7 days, how often have you been bothered by emotional problems such as feeling anxious, depressed, or irritable? Sometimes Often Often   In the past 7 days, how would you rate your fatigue on average? Moderate Moderate Moderate   In the past 7 days, how would you rate your pain on average,  where 0 means no pain, and 10 means worst imaginable pain? 2 1 0   In general, would you say your health is: 2 2 2   In general, would you say your quality of life is: 3 3 4   In general, how would you rate your physical health? 1 2 1   In general, how would you rate your mental health, including your mood and your ability to think? 3 2 2   In general, how would you rate your satisfaction with your social activities and relationships? 3 2 2   In general, please rate how well you carry out your usual social activities and roles. (This includes activities at home, at work and in your community, and responsibilities as a parent, child, spouse, employee, friend, etc.) 2 3 2   To what extent are you able to carry out your everyday physical activities such as walking, climbing stairs, carrying groceries, or moving a chair? 4 3 4   In the past 7 days, how often have you been bothered by emotional problems such as feeling anxious, depressed, or irritable? 3 4 4   In the past 7 days, how would you rate your fatigue on average? 3 3 3   In the past 7 days, how would you rate your pain on average, where 0 means no pain, and 10 means worst imaginable pain? 2 1 0   Global Mental Health Score 12 9 10   Global Physical Health Score 12 12 13   PROMIS TOTAL - SUBSCORES 24 21 23         ASSESSMENT: Current Emotional / Mental Status (status of significant symptoms):   Risk status (Self / Other harm or suicidal ideation)   Patient denies current fears or concerns for personal safety.   Patient reports the following current or recent suicidal ideation or behaviors: reported thinking family would be better off with out him.   Patient denies current or recent homicidal ideation or behaviors.   Patient denies current or recent self injurious behavior or ideation.   Patient denies other safety concerns.   Patient reports there has been no change in risk factors since their last session.     Patient reports there has been no change in  protective factors since their last session.     Recommended that patient call 911 or go to the local ED should there be a change in any of these risk factors.     Appearance:   Appropriate    Eye Contact:   Good    Psychomotor Behavior: Normal    Attitude:   Cooperative  Interested   Orientation:   All   Speech    Rate / Production: Emotional    Volume:  Normal    Mood:    Depressed    Affect:    Worrisome    Thought Content:  Rumination    Thought Form:  Coherent  Logical    Insight:    Good  and Fair      Medication Review:   Changes to psychiatric medications, see updated Medication List in EPIC.      Medication Compliance:   Yes     Changes in Health Issues:   None reported     Chemical Use Review:   Substance Use: Chemical use reviewed, no active concerns identified      Tobacco Use: No current tobacco use.      Diagnosis:  1. Severe episode of recurrent major depressive disorder, without psychotic features (H)        Collateral Reports Completed:   Not Applicable    PLAN: (Patient Tasks / Therapist Tasks / Other)  Patient will engage in value of support daily to see what that means to engage in valued action    SAURABH Domínguez                                                         ______________________________________________________________________    Individual Treatment Plan    Patient's Name: Steve Morgan  YOB: 1960    Date of Creation: May 17, 2023  Date Treatment Plan Last Reviewed/Revised: August 17, 2023    DSM5 Diagnoses: 296.35 (F33.41)  Major Depressive Disorder, Recurrent Episode, In partial remission With melancholic features  Psychosocial / Contextual Factors: Family Factors Patient struggles with getting along with sister-in-law and Societal Factors Patient notes patriachial values that influence his thoughts around emotions.  PROMIS (reviewed every 90 days): PROMIS-10 Scores        10/27/2022    12:43 PM 5/15/2023    10:44 AM 5/21/2023     4:35 PM   PROMIS-10  Total Score w/o Sub Scores   PROMIS TOTAL - SUBSCORES 24 21 23       Referral / Collaboration:  The following referral(s) will be initiated: couple's counseling .    Anticipated number of session for this episode of care: 9-12 sessions  Anticipation frequency of session: Every other week  Anticipated Duration of each session: 38-52 minutes  Treatment plan will be reviewed in 90 days or when goals have been changed.       MeasurableTreatment Goal(s) related to diagnosis / functional impairment(s)  Goal 1: Patient will work to increase motivation    I will know I've met my goal when will clean the house work for 1-2 hours each day, yard work once e/o week, using [employer] daily, I would have a spread sheet to track bills that come out automatically and twice per month updating, I am talking to wife about activities to do together and volunteering a couple of times per week.      Objective #A (Patient Action)    Patient will identify patterns of escalation  (i.e. tightness in chest, flushed face, increased heart rate, clenched hands, etc.)  Decrease frequency and intensity of feeling down, depressed, hopeless.  Status: New - Date: 8/17/23      Intervention(s)  Therapist will teach  grounding and learning about emotions .    Objective #B  Patient will identify at least 4-6 techniques for intervening on the escalation  Increase interest, engagement, and pleasure in doing things.  Status: New - Date: 8/17/23      Intervention(s)  Therapist will assign homework to engage in valued action  provide educational materials on values  teach valued action .    Objective #C  Patient will learn and demonstrate 2-4 assertiveness skill(s)  Improve concentration, focus, and mindfulness in daily activities .  Status: New - Date: 8/17/23      Intervention(s)  Therapist will assign homework to engage in mindfulness of emotion and thought defusion  provide educational materials on on mindfulness and thoughts  role-play assertiveness  skills  teach thought defusion and acceptance of emotion .      Patient has reviewed and agreed to the above plan.      Veronica Chavis, [facility]  August 17, 2023

## 2023-08-23 ENCOUNTER — VIRTUAL VISIT (OUTPATIENT)
Dept: PSYCHOLOGY | Facility: CLINIC | Age: 63
End: 2023-08-23
Payer: MEDICARE

## 2023-08-23 DIAGNOSIS — F33.2 SEVERE EPISODE OF RECURRENT MAJOR DEPRESSIVE DISORDER, WITHOUT PSYCHOTIC FEATURES (H): Primary | ICD-10-CM

## 2023-08-23 PROCEDURE — 90837 PSYTX W PT 60 MINUTES: CPT | Mod: VID | Performed by: SOCIAL WORKER

## 2023-08-23 ASSESSMENT — PATIENT HEALTH QUESTIONNAIRE - PHQ9
SUM OF ALL RESPONSES TO PHQ QUESTIONS 1-9: 10
10. IF YOU CHECKED OFF ANY PROBLEMS, HOW DIFFICULT HAVE THESE PROBLEMS MADE IT FOR YOU TO DO YOUR WORK, TAKE CARE OF THINGS AT HOME, OR GET ALONG WITH OTHER PEOPLE: SOMEWHAT DIFFICULT
SUM OF ALL RESPONSES TO PHQ QUESTIONS 1-9: 10

## 2023-08-23 NOTE — PROGRESS NOTES
[facility]                                     Progress Note    Patient Name: Steve Morgan  Date: August 23, 2023         Service Type: Individual      Session Start Time: 14:30  Session End Time: 15:26     Session Length: 56    Session #: 5    Attendees: Client attended alone    Service Modality:  Video Visit:      Provider verified identity through the following two step process.  Patient provided:  Patient is known previously to provider    Telemedicine Visit: The patient's condition can be safely assessed and treated via synchronous audio and visual telemedicine encounter.      Reason for Telemedicine Visit: Patient has requested telehealth visit    Originating Site (Patient Location): Patient's home    Distant Site (Provider Location): Provider Remote Setting- Home Office    Consent:  The patient/guardian has verbally consented to: the potential risks and benefits of telemedicine (video visit) versus in person care; bill my insurance or make self-payment for services provided; and responsibility for payment of non-covered services.     Patient would like the video invitation sent by:  My Chart    Mode of Communication:  Video Conference via Amwell    Distant Location (Provider):  Off-site    As the provider I attest to compliance with applicable laws and regulations related to telemedicine.    DATA  Extended Session (53+ minutes): PROLONGED SERVICE IN THE OUTPATIENT SETTING REQUIRING DIRECT (FACE-TO-FACE) PATIENT CONTACT BEYOND THE USUAL SERVICE:    - Treatment protocol required additional time to complete, due to the nature of diagnosis being treated.  See Interventions section for details  Interactive Complexity: No  Crisis: No        Progress Since Last Session (Related to Symptoms / Goals / Homework):   Symptoms: Improving has been able to do more to help around house    Homework: Achieved / completed to satisfaction      Episode of Care Goals: Achieved / completed to satisfaction -  PREPARATION (Decided to change - considering how); Intervened by negotiating a change plan and determining options / strategies for behavior change, identifying triggers, exploring social supports, and working towards setting a date to begin behavior change  Satisfactory progress - ACTION (Actively working towards change); Intervened by reinforcing change plan / affirming steps taken     Current / Ongoing Stressors and Concerns:   Struggles with motivation, getting along with wife and sister-in-law and being able to express and deal with emotions in healthy ways.     Treatment Objective(s) Addressed in This Session:   Increase interest, engagement, and pleasure in doing things  Discuss motivation / ambivalence about a referral to specialty mental health services (Therapy, Day Tx, PHP)     Intervention:   ACT: Discussed motivation to get started with hobbies.  Had patient name barriers to getting started, patient reported tv distracts (enjoyment from tv, tv doesn't cause frustration, relaxing), he feels frustrated with self (it takes time and takes away from relationships).  Worked with patient to process this and reflect on valued action.     Anger responses:  Not cleaning or doing other work the house  Stop everything (including therapy)  Watches tv  [place]  Not saying anything    Values: Openness, trustworthy, love, honesty, support    Assessments completed prior to visit:  The following assessments were completed by patient for this visit:  PHQ2:       3/10/2023     1:53 PM 6/12/2022     4:29 PM 3/28/2022    10:47 AM 11/16/2021    10:14 AM 9/23/2021    10:32 AM 9/2/2020     9:26 AM 1/16/2020     9:05 AM   PHQ-2 ( 1999 [employer])   Q1: Little interest or pleasure in doing things  3 0 1 3 3 0   Q2: Feeling down, depressed or hopeless  1 0 1 1 3 0   PHQ-2 Score  4 0 2 4 6 0   PHQ-2 Total Score (12-17 Years)- Positive if 3 or more points; Administer PHQ-A if positive     4 6 0   Q1: Little interest or pleasure in doing  things  Nearly every day  Several days Nearly every day Nearly every day    Q2: Feeling down, depressed or hopeless  Several days  Several days Several days Nearly every day    PHQ-2 Score Incomplete 4  2 4 6      PHQ9:       3/10/2023     1:47 PM 5/17/2023     7:48 AM 5/25/2023     1:19 PM 6/8/2023    12:46 PM 6/30/2023     8:20 AM 8/17/2023     7:46 AM 8/23/2023     2:13 PM   PHQ-9 SCORE   PHQ-9 Total Score MyChart 10 (Moderate depression) 14 (Moderate depression) 16 (Moderately severe depression) 18 (Moderately severe depression) 7 (Mild depression) 14 (Moderate depression) 10 (Moderate depression)   PHQ-9 Total Score 10 14 16 18 7 14 10     PROMIS 10-Global Health (all questions and answers displayed):       10/27/2022    12:43 PM 5/15/2023    10:44 AM 5/21/2023     4:35 PM 8/23/2023     2:16 PM   PROMIS 10   In general, would you say your health is: Fair Fair Fair Fair   In general, would you say your quality of life is: Good Good Very good Good   In general, how would you rate your physical health? Poor Fair Poor Poor   In general, how would you rate your mental health, including your mood and your ability to think? Good Fair Fair Fair   In general, how would you rate your satisfaction with your social activities and relationships? Good Fair Fair Good   In general, please rate how well you carry out your usual social activities and roles Fair Good Fair Fair   To what extent are you able to carry out your everyday physical activities such as walking, climbing stairs, carrying groceries, or moving a chair? Mostly Moderately Mostly Completely   In the past 7 days, how often have you been bothered by emotional problems such as feeling anxious, depressed, or irritable? Sometimes Often Often Sometimes   In the past 7 days, how would you rate your fatigue on average? Moderate Moderate Moderate Mild   In the past 7 days, how would you rate your pain on average, where 0 means no pain, and 10 means worst imaginable  pain? 2 1 0 0   In general, would you say your health is: 2 2 2 2   In general, would you say your quality of life is: 3 3 4 3   In general, how would you rate your physical health? 1 2 1 1   In general, how would you rate your mental health, including your mood and your ability to think? 3 2 2 2   In general, how would you rate your satisfaction with your social activities and relationships? 3 2 2 3   In general, please rate how well you carry out your usual social activities and roles. (This includes activities at home, at work and in your community, and responsibilities as a parent, child, spouse, employee, friend, etc.) 2 3 2 2   To what extent are you able to carry out your everyday physical activities such as walking, climbing stairs, carrying groceries, or moving a chair? 4 3 4 5   In the past 7 days, how often have you been bothered by emotional problems such as feeling anxious, depressed, or irritable? 3 4 4 3   In the past 7 days, how would you rate your fatigue on average? 3 3 3 2   In the past 7 days, how would you rate your pain on average, where 0 means no pain, and 10 means worst imaginable pain? 2 1 0 0   Global Mental Health Score 12 9 10 11   Global Physical Health Score 12 12 13 15   PROMIS TOTAL - SUBSCORES 24 21 23 26         ASSESSMENT: Current Emotional / Mental Status (status of significant symptoms):   Risk status (Self / Other harm or suicidal ideation)   Patient denies current fears or concerns for personal safety.   Patient denies current or recent suicidal ideation or behaviors.   Patient denies current or recent homicidal ideation or behaviors.   Patient denies current or recent self injurious behavior or ideation.   Patient denies other safety concerns.   Patient reports there has been no change in risk factors since their last session.     Patient reports there has been no change in protective factors since their last session.     Recommended that patient call 911 or go to the local  ED should there be a change in any of these risk factors.     Appearance:   Appropriate    Eye Contact:   Good    Psychomotor Behavior: Normal    Attitude:   Cooperative  Interested   Orientation:   All   Speech    Rate / Production: Normal/ Responsive    Volume:  Normal    Mood:    Euthymic   Affect:    Appropriate    Thought Content:  Rumination    Thought Form:  Coherent  Logical    Insight:    Good      Medication Review:   No changes to current psychiatric medication(s)     Medication Compliance:   Yes     Changes in Health Issues:   None reported     Chemical Use Review:   Substance Use: Chemical use reviewed, no active concerns identified      Tobacco Use: No current tobacco use.      Diagnosis:  1. Severe episode of recurrent major depressive disorder, without psychotic features (H)        Collateral Reports Completed:   Not Applicable    PLAN: (Patient Tasks / Therapist Tasks / Other)  Patient will engage in value of openness by spending time watching tv with wife in the evening  Patient will continue to engage in value of support daily    SAURABH Domínguez                                                         ______________________________________________________________________    Individual Treatment Plan    Patient's Name: Steve Morgan  YOB: 1960    Date of Creation: May 17, 2023  Date Treatment Plan Last Reviewed/Revised: August 17, 2023    DSM5 Diagnoses: 296.35 (F33.41)  Major Depressive Disorder, Recurrent Episode, In partial remission With melancholic features  Psychosocial / Contextual Factors: Family Factors Patient struggles with getting along with sister-in-law and Societal Factors Patient notes patriachial values that influence his thoughts around emotions.  PROMIS (reviewed every 90 days): PROMIS-10 Scores        5/15/2023    10:44 AM 5/21/2023     4:35 PM 8/23/2023     2:16 PM   PROMIS-10 Total Score w/o Sub Scores   PROMIS TOTAL - SUBSCORES 21 23 26        Referral / Collaboration:  The following referral(s) will be initiated: couple's counseling .    Anticipated number of session for this episode of care: 9-12 sessions  Anticipation frequency of session: Every other week  Anticipated Duration of each session: 38-52 minutes  Treatment plan will be reviewed in 90 days or when goals have been changed.       MeasurableTreatment Goal(s) related to diagnosis / functional impairment(s)  Goal 1: Patient will work to increase motivation    I will know I've met my goal when will clean the house work for 1-2 hours each day, yard work once e/o week, using [employer] daily, I would have a spread sheet to track bills that come out automatically and twice per month updating, I am talking to wife about activities to do together and volunteering a couple of times per week.      Objective #A (Patient Action)    Patient will identify patterns of escalation  (i.e. tightness in chest, flushed face, increased heart rate, clenched hands, etc.)  Decrease frequency and intensity of feeling down, depressed, hopeless.  Status: New - Date: 8/17/23      Intervention(s)  Therapist will teach  grounding and learning about emotions .    Objective #B  Patient will identify at least 4-6 techniques for intervening on the escalation  Increase interest, engagement, and pleasure in doing things.  Status: New - Date: 8/17/23      Intervention(s)  Therapist will assign homework to engage in valued action  provide educational materials on values  teach valued action .    Objective #C  Patient will learn and demonstrate 2-4 assertiveness skill(s)  Improve concentration, focus, and mindfulness in daily activities .  Status: New - Date: 8/17/23      Intervention(s)  Therapist will assign homework to engage in mindfulness of emotion and thought defusion  provide educational materials on on mindfulness and thoughts  role-play assertiveness skills  teach thought defusion and acceptance of emotion  .      Patient has reviewed and agreed to the above plan.      Veronica Chavis, [facility]  August 17, 2023

## 2023-08-28 ENCOUNTER — PATIENT OUTREACH (OUTPATIENT)
Dept: FAMILY MEDICINE | Facility: CLINIC | Age: 63
End: 2023-08-28
Payer: MEDICARE

## 2023-08-28 NOTE — TELEPHONE ENCOUNTER
Patient is scheduled for lab only appointment and is unsure what labs are needed.   -RN returned call to patient and informed Dr. Kendall has orders in place and patient has follow-up scheduled 9/6/23. Patient requests to complete labs ordered by Dr. Pinto at time of lab only. Note added to lab only appointment.     Patient reports he has an appointment with [facility] on 11/30/23 and would like to know if lab orders have been received at this time. RN informed there are no lab orders from Dr. Casey at this time. Patient reports he will talk to care team at [facility] and request lab orders sent prior to appointment.     Patient will return call to this RN if needed.     Yudith GARCIA RN, BSN, PHN - PAL (patient advocate liaison)  [facility]  [phone]

## 2023-08-31 ENCOUNTER — LAB (OUTPATIENT)
Dept: LAB | Facility: CLINIC | Age: 63
End: 2023-08-31
Payer: MEDICARE

## 2023-08-31 DIAGNOSIS — F33.1 MAJOR DEPRESSIVE DISORDER, RECURRENT EPISODE, MODERATE (H): ICD-10-CM

## 2023-08-31 DIAGNOSIS — E11.40 TYPE 2 DIABETES MELLITUS WITH DIABETIC NEUROPATHY, WITH LONG-TERM CURRENT USE OF INSULIN (H): ICD-10-CM

## 2023-08-31 DIAGNOSIS — Z79.4 TYPE 2 DIABETES MELLITUS WITH DIABETIC NEUROPATHY, WITH LONG-TERM CURRENT USE OF INSULIN (H): ICD-10-CM

## 2023-08-31 LAB
ANION GAP SERPL CALCULATED.3IONS-SCNC: 10 MMOL/L (ref 7–15)
BUN SERPL-MCNC: 20.5 MG/DL (ref 8–23)
CALCIUM SERPL-MCNC: 9.6 MG/DL (ref 8.8–10.2)
CHLORIDE SERPL-SCNC: 100 MMOL/L (ref 98–107)
CREAT SERPL-MCNC: 1.45 MG/DL (ref 0.67–1.17)
DEPRECATED HCO3 PLAS-SCNC: 23 MMOL/L (ref 22–29)
GFR SERPL CREATININE-BSD FRML MDRD: 53 ML/MIN/1.73M2
GLUCOSE SERPL-MCNC: 226 MG/DL (ref 70–99)
HBA1C MFR BLD: 9.4 % (ref 0–5.6)
POTASSIUM SERPL-SCNC: 4.5 MMOL/L (ref 3.4–5.3)
SODIUM SERPL-SCNC: 133 MMOL/L (ref 136–145)

## 2023-08-31 PROCEDURE — 36415 COLL VENOUS BLD VENIPUNCTURE: CPT

## 2023-08-31 PROCEDURE — 83036 HEMOGLOBIN GLYCOSYLATED A1C: CPT

## 2023-08-31 PROCEDURE — 80048 BASIC METABOLIC PNL TOTAL CA: CPT

## 2023-09-06 ENCOUNTER — VIRTUAL VISIT (OUTPATIENT)
Dept: PSYCHOLOGY | Facility: CLINIC | Age: 63
End: 2023-09-06
Payer: MEDICARE

## 2023-09-06 ENCOUNTER — OFFICE VISIT (OUTPATIENT)
Dept: ENDOCRINOLOGY | Facility: CLINIC | Age: 63
End: 2023-09-06
Payer: MEDICARE

## 2023-09-06 VITALS
RESPIRATION RATE: 18 BRPM | HEIGHT: 65 IN | WEIGHT: 238.3 LBS | SYSTOLIC BLOOD PRESSURE: 122 MMHG | BODY MASS INDEX: 39.7 KG/M2 | DIASTOLIC BLOOD PRESSURE: 70 MMHG | TEMPERATURE: 97.3 F | OXYGEN SATURATION: 99 % | HEART RATE: 78 BPM

## 2023-09-06 DIAGNOSIS — E11.40 TYPE 2 DIABETES MELLITUS WITH DIABETIC NEUROPATHY, WITH LONG-TERM CURRENT USE OF INSULIN (H): Primary | ICD-10-CM

## 2023-09-06 DIAGNOSIS — E11.40 TYPE 2 DIABETES MELLITUS WITH DIABETIC NEUROPATHY, WITHOUT LONG-TERM CURRENT USE OF INSULIN (H): ICD-10-CM

## 2023-09-06 DIAGNOSIS — F33.41 RECURRENT MAJOR DEPRESSIVE DISORDER, IN PARTIAL REMISSION (H): Primary | ICD-10-CM

## 2023-09-06 DIAGNOSIS — Z79.4 TYPE 2 DIABETES MELLITUS WITH DIABETIC NEUROPATHY, WITH LONG-TERM CURRENT USE OF INSULIN (H): Primary | ICD-10-CM

## 2023-09-06 DIAGNOSIS — R73.9 HYPERGLYCEMIA: ICD-10-CM

## 2023-09-06 DIAGNOSIS — E03.4 HYPOTHYROIDISM DUE TO ACQUIRED ATROPHY OF THYROID: ICD-10-CM

## 2023-09-06 PROCEDURE — 90834 PSYTX W PT 45 MINUTES: CPT | Mod: VID | Performed by: SOCIAL WORKER

## 2023-09-06 PROCEDURE — 99214 OFFICE O/P EST MOD 30 MIN: CPT | Performed by: INTERNAL MEDICINE

## 2023-09-06 RX ORDER — INSULIN GLARGINE 100 [IU]/ML
40 INJECTION, SOLUTION SUBCUTANEOUS DAILY
Qty: 30 ML | Refills: 1 | Status: SHIPPED | OUTPATIENT
Start: 2023-09-06 | End: 2023-09-06

## 2023-09-06 RX ORDER — GLIPIZIDE 10 MG/1
10 TABLET, FILM COATED, EXTENDED RELEASE ORAL DAILY
Qty: 90 TABLET | Refills: 1 | Status: SHIPPED | OUTPATIENT
Start: 2023-09-06 | End: 2024-04-22

## 2023-09-06 RX ORDER — INSULIN GLARGINE 100 [IU]/ML
40 INJECTION, SOLUTION SUBCUTANEOUS DAILY
Qty: 30 ML | Refills: 1 | Status: SHIPPED | OUTPATIENT
Start: 2023-09-06 | End: 2024-03-05

## 2023-09-06 ASSESSMENT — ENCOUNTER SYMPTOMS
TREMORS: 1
TINGLING: 0
INSOMNIA: 0
NECK MASS: 0
LOSS OF CONSCIOUSNESS: 0
DECREASED CONCENTRATION: 0
DEPRESSION: 1
DIZZINESS: 0
NERVOUS/ANXIOUS: 0
SORE THROAT: 0
SMELL DISTURBANCE: 0
PARALYSIS: 0
DISTURBANCES IN COORDINATION: 0
HEADACHES: 0
SEIZURES: 0
TASTE DISTURBANCE: 0
PANIC: 0
MEMORY LOSS: 1
NUMBNESS: 0
WEAKNESS: 0
SINUS CONGESTION: 0
SPEECH CHANGE: 0
TROUBLE SWALLOWING: 0
HOARSE VOICE: 0
SINUS PAIN: 0

## 2023-09-06 NOTE — NURSING NOTE
ENDOCRINOLOGY INTAKE FORM    Patient Name:  Steve Morgan  :  1960    Is patient Diabetic?   Yes: type 2  Does patient have non-diabetic or other endocrine issues?  Yes:   Hyponatremia  Dyslipidemia  Chronic hyponatremia  HYPERLIPIDEMIA LDL GOAL <100  Morbid obesity due to excess calories (H)  Hypothyroidism       Vitals: There were no vitals taken for this visit.  BMI= There is no height or weight on file to calculate BMI.    Flu vaccine:  No  Pneumonia vaccine:  Yes: 23 x 2, 20 x 1    Smoking and Alcohol use:  Social History     Tobacco Use    Smoking status: Never    Smokeless tobacco: Never   Vaping Use    Vaping Use: Never used   Substance Use Topics    Alcohol use: Yes     Comment: Occassionally    Drug use: No       Foot Exam: Yes: 02/15/23  Eye Exam:  Yes: 23  Dental Exam:    Aspirin Use:  Yes: 81 mg    Lab Results   Component Value Date    A1C 9.4 2023    A1C 7.6 2023    A1C 8.1 2023    A1C 9.3 2023    A1C 9.2 2022    A1C 7.4 2021    A1C 9.9 2020    A1C 11.7 2019    A1C 11.6 2019    A1C 11.8 2018       Lab Results   Component Value Date    MICROL 78.3 2023    MICROL 84 2021    MICROL 14 2020     No results found for: MICROALBUMIN    Anisha Diop [facility] [place]  [phone]

## 2023-09-06 NOTE — PATIENT INSTRUCTIONS
[facility]  Dr Meraz, Endocrinology Department    [facility]   [address]  [place] [address]  Appointment [phone]  Fax: [phone]  [place]: Monday - Thursday      Please check the cost coverage and copay with insurance before recommended tests, services and medications (especially if new medications are prescribed).     If ordered, please get blood work done 1 week prior to your next appointment so they will be available to Dr. Meraz at your visit.    To provide the best diabetic care, please bring your blood glucose meter to each and every visit with your  Endocrinologist. Your blood glucose CGM/meter/insulin pump will be downloaded at every appointment.  Please arrive 15 minutes before your scheduled appointment. This will allow for your blood glucose CGM/meter/insulin pump  to be downloaded.  If you are wearing DEXCOM please bring  or sharing code from the Dexcom [employer] [place] so that it can be downloaded.  If you are using FREESTYLE JUAN personal sensors please bring the reader.    Continue Metformin 1000 mg twice a day  Continue Glipizide XL 10 mg/day in AM with food  Increase Lantus to 40 units at night.  Start using juan 2 as soon as possible. Scan often.  Let us know if need juan 3 Rx.    Follow up Cara SMALLS in 6 weeks  Repeat labs and follow up in 3-4 months  Please make a lab appointment for blood work and follow up clinic appointment in 1 week after that to discuss results.    Recommend checking blood sugars before meals and at bedtime.    If Blood glucose are low more often-> 2-3 times/week- give us a call.  Make better food choices: reduce carbs, Reduce portion size, weight loss and exercise 3-4 times a week.    What is hypoglycemia:  Hypoglycemia is when blood sugar levels become too low - below 70 m/dl.      What causes hypoglycemia?  - using too much insulin  -taking too many diabetes pills  -not eating  enough, or skipping meals or snacks  -not eating enough carbohydrate with meals  -changing your exercise routine  -drinking alcohol in excess    It is also possible to have hypoglycemia even when you are carefully managing your blood sugar levels.    What does it feel like when blood sugars get too low?  You may feel:  - anxious  -confused  -dizzy  -hungry  -light-headed  -nervous  -shaky  -sleepy  -sweaty    You may have  -blurred or cloudy vision  -heart palpitations (heart skips a beat or races)  -tingling or numbness around the mouth and tongue  -tremors    What to do if you have symptoms of hypoglycmemia:  If you think your blood sugar is too low, check it with a glucose meter.  If its below 70 mg/dl, consume one of the following:  Fruit juice (1/2 cup)  Glucose tablets (15 grams)  Hard candy (5 to 7 pieces)  Honey or sugar (2 teaspoons)  Milk (1/2 cup)  Soft drink (non-diet, 1/2 cup)    Wait 15 minutes and check your blood glucose again.  IF it is still below 70 mg/dl, have another food item listed above. Wait another 15 minutes and repeat the blood glucose test.  Have a small meal or snack that contains some carbohydrate after your blood glucose rises above 70 mg/dl.    If you are at risk of hypoglycemia, always carry with you glucose tablets or one of the foods listed above.      To prevent Hypoglycemia:  Avoid situations that may cause hypoglycemia  Before making any change to your diet or exercise routine, discuss them with your healthcare provider  Keep a record of your blood glucose levels.  Include the time of day, diabetes medications, when you had your last meal or snack, and what you were doing at the time (e.g. Watching TV, gardening, jogging, etc).    Talk to your healthcare provider if your blood glucose levels are often low        Patient guide on hypoglycemia    [email or web address]-diagnosis-[email or web address]

## 2023-09-06 NOTE — PROGRESS NOTES
[facility]                                     Progress Note    Patient Name: Steve Morgan  Date: September 6, 2023         Service Type: Individual      Session Start Time: 14:30  Session End Time: 15:22     Session Length: 52    Session #: 6    Attendees: Client attended alone    Service Modality:  Video Visit:      Provider verified identity through the following two step process.  Patient provided:  Patient is known previously to provider    Telemedicine Visit: The patient's condition can be safely assessed and treated via synchronous audio and visual telemedicine encounter.      Reason for Telemedicine Visit: Patient has requested telehealth visit    Originating Site (Patient Location): Patient's home    Distant Site (Provider Location): [facility] [place] [facility]    Consent:  The patient/guardian has verbally consented to: the potential risks and benefits of telemedicine (video visit) versus in person care; bill my insurance or make self-payment for services provided; and responsibility for payment of non-covered services.     Patient would like the video invitation sent by:  My Chart    Mode of Communication:  Video Conference via [employer]well    Distant Location (Provider):  On-site    As the provider I attest to compliance with applicable laws and regulations related to telemedicine.    DATA  Extended Session (53+ minutes): No  Interactive Complexity: No  Crisis: No        Progress Since Last Session (Related to Symptoms / Goals / Homework):   Symptoms: Worsening upset with self due to lack of motivation to do health related activities    Homework: Achieved / completed to satisfaction      Episode of Care Goals: Achieved / completed to satisfaction - PREPARATION (Decided to change - considering how); Intervened by negotiating a change plan and determining options / strategies for behavior change, identifying triggers, exploring social supports, and  working towards setting a date to begin behavior change  Satisfactory progress - ACTION (Actively working towards change); Intervened by reinforcing change plan / affirming steps taken     Current / Ongoing Stressors and Concerns:   Struggles with motivation, getting along with wife and sister-in-law and being able to express and deal with emotions in healthy ways.     Treatment Objective(s) Addressed in This Session:   Increase interest, engagement, and pleasure in doing things  Improve concentration, focus, and mindfulness in daily activities      Intervention:   DBT: Discussed radical acceptance with patient.  ACT: Worked on having patient name emotions, thoughts and name values and valued action.    Anger responses:  Not cleaning or doing other work the house  Stop everything (including therapy)  Watches tv  [place]  Not saying anything    Values: Openness, trustworthy, love, honesty, support, health    Assessments completed prior to visit:  The following assessments were completed by patient for this visit:  PHQ2:       3/10/2023     1:53 PM 6/12/2022     4:29 PM 3/28/2022    10:47 AM 11/16/2021    10:14 AM 9/23/2021    10:32 AM 9/2/2020     9:26 AM 1/16/2020     9:05 AM   PHQ-2 ( 1999 [employer])   Q1: Little interest or pleasure in doing things  3 0 1 3 3 0   Q2: Feeling down, depressed or hopeless  1 0 1 1 3 0   PHQ-2 Score  4 0 2 4 6 0   PHQ-2 Total Score (12-17 Years)- Positive if 3 or more points; Administer PHQ-A if positive     4 6 0   Q1: Little interest or pleasure in doing things  Nearly every day  Several days Nearly every day Nearly every day    Q2: Feeling down, depressed or hopeless  Several days  Several days Several days Nearly every day    PHQ-2 Score Incomplete 4  2 4 6      PHQ9:       3/10/2023     1:47 PM 5/17/2023     7:48 AM 5/25/2023     1:19 PM 6/8/2023    12:46 PM 6/30/2023     8:20 AM 8/17/2023     7:46 AM 8/23/2023     2:13 PM   PHQ-9 SCORE   PHQ-9 Total Score MyChart 10 (Moderate  depression) 14 (Moderate depression) 16 (Moderately severe depression) 18 (Moderately severe depression) 7 (Mild depression) 14 (Moderate depression) 10 (Moderate depression)   PHQ-9 Total Score 10 14 16 18 7 14 10     PROMIS 10-Global Health (all questions and answers displayed):       10/27/2022    12:43 PM 5/15/2023    10:44 AM 5/21/2023     4:35 PM 8/23/2023     2:16 PM   PROMIS 10   In general, would you say your health is: Fair Fair Fair Fair   In general, would you say your quality of life is: Good Good Very good Good   In general, how would you rate your physical health? Poor Fair Poor Poor   In general, how would you rate your mental health, including your mood and your ability to think? Good Fair Fair Fair   In general, how would you rate your satisfaction with your social activities and relationships? Good Fair Fair Good   In general, please rate how well you carry out your usual social activities and roles Fair Good Fair Fair   To what extent are you able to carry out your everyday physical activities such as walking, climbing stairs, carrying groceries, or moving a chair? Mostly Moderately Mostly Completely   In the past 7 days, how often have you been bothered by emotional problems such as feeling anxious, depressed, or irritable? Sometimes Often Often Sometimes   In the past 7 days, how would you rate your fatigue on average? Moderate Moderate Moderate Mild   In the past 7 days, how would you rate your pain on average, where 0 means no pain, and 10 means worst imaginable pain? 2 1 0 0   In general, would you say your health is: 2 2 2 2   In general, would you say your quality of life is: 3 3 4 3   In general, how would you rate your physical health? 1 2 1 1   In general, how would you rate your mental health, including your mood and your ability to think? 3 2 2 2   In general, how would you rate your satisfaction with your social activities and relationships? 3 2 2 3   In general, please rate how well  you carry out your usual social activities and roles. (This includes activities at home, at work and in your community, and responsibilities as a parent, child, spouse, employee, friend, etc.) 2 3 2 2   To what extent are you able to carry out your everyday physical activities such as walking, climbing stairs, carrying groceries, or moving a chair? 4 3 4 5   In the past 7 days, how often have you been bothered by emotional problems such as feeling anxious, depressed, or irritable? 3 4 4 3   In the past 7 days, how would you rate your fatigue on average? 3 3 3 2   In the past 7 days, how would you rate your pain on average, where 0 means no pain, and 10 means worst imaginable pain? 2 1 0 0   Global Mental Health Score 12 9 10 11   Global Physical Health Score 12 12 13 15   PROMIS TOTAL - SUBSCORES 24 21 23 26         ASSESSMENT: Current Emotional / Mental Status (status of significant symptoms):   Risk status (Self / Other harm or suicidal ideation)   Patient denies current fears or concerns for personal safety.   Patient denies current or recent suicidal ideation or behaviors.   Patient denies current or recent homicidal ideation or behaviors.   Patient denies current or recent self injurious behavior or ideation.   Patient denies other safety concerns.   Patient reports there has been no change in risk factors since their last session.     Patient reports there has been no change in protective factors since their last session.     Recommended that patient call 911 or go to the local ED should there be a change in any of these risk factors.     Appearance:   Appropriate    Eye Contact:   Good    Psychomotor Behavior: Normal    Attitude:   Cooperative  Interested   Orientation:   All   Speech    Rate / Production: Normal/ Responsive    Volume:  Normal    Mood:    frustrated   Affect:    Appropriate    Thought Content:  Rumination    Thought Form:  Coherent  Logical    Insight:    Good      Medication Review:   No  changes to current psychiatric medication(s)     Medication Compliance:   Yes     Changes in Health Issues:   None reported     Chemical Use Review:   Substance Use: Chemical use reviewed, no active concerns identified      Tobacco Use: No current tobacco use.      Diagnosis:  1. Recurrent major depressive disorder, in partial remission (H)          Collateral Reports Completed:   Not Applicable    PLAN: (Patient Tasks / Therapist Tasks / Other)  Patient will engage in value of trustworthiness and health to practice acceptance with diabetes    Veronica Chavis, ISAUROSW                                                         ______________________________________________________________________    Individual Treatment Plan    Patient's Name: Steve Morgan  YOB: 1960    Date of Creation: May 17, 2023  Date Treatment Plan Last Reviewed/Revised: August 17, 2023    DSM5 Diagnoses: 296.35 (F33.41)  Major Depressive Disorder, Recurrent Episode, In partial remission With melancholic features  Psychosocial / Contextual Factors: Family Factors Patient struggles with getting along with sister-in-law and Societal Factors Patient notes patriachial values that influence his thoughts around emotions.  PROMIS (reviewed every 90 days): PROMIS-10 Scores        5/15/2023    10:44 AM 5/21/2023     4:35 PM 8/23/2023     2:16 PM   PROMIS-10 Total Score w/o Sub Scores   PROMIS TOTAL - SUBSCORES 21 23 26       Referral / Collaboration:  The following referral(s) will be initiated: couple's counseling .    Anticipated number of session for this episode of care: 9-12 sessions  Anticipation frequency of session: Every other week  Anticipated Duration of each session: 38-52 minutes  Treatment plan will be reviewed in 90 days or when goals have been changed.       MeasurableTreatment Goal(s) related to diagnosis / functional impairment(s)  Goal 1: Patient will work to increase motivation    I will know I've met my goal when  will clean the house work for 1-2 hours each day, yard work once e/o week, using [employer] daily, I would have a spread sheet to track bills that come out automatically and twice per month updating, I am talking to wife about activities to do together and volunteering a couple of times per week.      Objective #A (Patient Action)    Patient will identify patterns of escalation  (i.e. tightness in chest, flushed face, increased heart rate, clenched hands, etc.)  Decrease frequency and intensity of feeling down, depressed, hopeless.  Status: New - Date: 8/17/23      Intervention(s)  Therapist will teach  grounding and learning about emotions .    Objective #B  Patient will identify at least 4-6 techniques for intervening on the escalation  Increase interest, engagement, and pleasure in doing things.  Status: New - Date: 8/17/23      Intervention(s)  Therapist will assign homework to engage in valued action  provide educational materials on values  teach valued action .    Objective #C  Patient will learn and demonstrate 2-4 assertiveness skill(s)  Improve concentration, focus, and mindfulness in daily activities .  Status: New - Date: 8/17/23      Intervention(s)  Therapist will assign homework to engage in mindfulness of emotion and thought defusion  provide educational materials on on mindfulness and thoughts  role-play assertiveness skills  teach thought defusion and acceptance of emotion .      Patient has reviewed and agreed to the above plan.      Veronica Chavis, [facility]  August 17, 2023

## 2023-09-06 NOTE — PROGRESS NOTES
"Name: Steve \"Caio\" Cathy  F/u for Diabetes and hypothyroidism.  HPI:  For f/u of DM.    has a past medical history of Cellulitis and abscess of trunk (6/27/2017), Depression, Hyperlipidemia LDL goal <100 (10/31/2010), Hypertension goal BP (blood pressure) < 140/90 (3/17/2011), Hypothyroidism (1/12/2010), Morbid obesity due to excess calories (H) (1/12/2010), Neuropathy in diabetes (H) (1/12/2010), Obesity (1/12/2010), Sleep apnea (1/12/2010), and Type 2 diabetes, HbA1C goal < 8% (H) (3/8/2011).    He has challenges with blood sugar testing due to limited use of his hands (neuropathy+ contractures + tremor - both significant) and increasing difficulty with ADL's.  Can't test his blood sugars using a meter due to the above.   He also has an hard time inserting the Nina sensors due to neuropathy and tremor.  History of poor compliance in past.  Was started on Novolog in past but not currently taking it.  He has stopping drinking pop since Sep 2021.  Exercise is limited.  CKD stage 2- followed by nephrology.  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  He underwent I&D and he was on partial amputation.  Earlier noted mild hyperkalemia which since has resolved.    Was using nina but currently not using. He has supplies at home.  Diet- not focused on ADA diet  He was on Jardaince-- not taking as it was expensive. He reports that other SGLT-2 inh are also expensive.  History of intermittent hyperkalemia.   Wt Readings from Last 2 Encounters:   09/06/23 108.1 kg (238 lb 4.8 oz)   07/11/23 106.1 kg (234 lb)     1. Type 2 DM:  Orginally diagnosed at the age of 33 or 38.  Was prediabetic prior, was not particularly symptomatic at the time, was noted on routine lab work    Current Regimen:   Metformin 1000 mg BID  Glipizide XL 10 mg/day in AM with food  Lantus 35 units at night    The plan was to start Trulicity but the copay was not affordable ($100 per refill).  Jardiance was " expensive as noted above.  Reports that other SGLT2 inh are also not covered by insurance.    yes:     Diabetes Medication(s)       Biguanides       metFORMIN (GLUCOPHAGE) 1000 MG tablet    Take 1 tablet (1,000 mg) by mouth 2 times daily (with meals)      Insulin       insulin glargine (BASAGLAR KWIKPEN) 100 UNIT/ML pen    Inject 35 Units Subcutaneous daily      Sulfonylureas       glipiZIDE (GLUCOTROL XL) 10 MG 24 hr tablet    Take 1 tablet (10 mg) by mouth daily          BS checks: [employer] Download: Blood glucose data reviewed personally. See nursing note from this encounter for details.  Unable to use glucose meter - he has a hard time doing fingerstick testing due to his tremor.    Complications:   Diabetes Complications  Description / Detail    Diabetic Retinopathy  No retinopathy,last exam 2/2020, [facility]   CAD / PAD  No   Neuropathy  Yes + diabetic neuropathy: numbness hands and feet.  Saw podiatry, Dr. Mathis, 1/27/2018- using diabetic shoes   Nephropathy / Microalbuminuria  Yes, elevated urine microalbumin   Gastroparesis  No   Hypoglycemia [place]  Not sure, gets 'kind of dizzy' when blood sugar is low but reports history of low blood sugars without symptoms     2. Hypertension: Blood Pressure:   BP Readings from Last 3 Encounters:   09/06/23 (!) 160/75   07/11/23 132/76   06/30/23 132/58   Blood pressure medications include atenolol 25 mg qd and losartan 50 mg every day.   3. Hyperlipidemia: Takes simvastatin 20 mg for lipid control.       4.  Hypothyroidism. Currently treated with levoxyl (KAMERON) 137 mcg daily. Takes the levoxyl first thing in the morning and waits 30+ minutes before eating breakfast.   1/2023 labs in range.  PMH/PSH:  Past Medical History:   Diagnosis Date    Cellulitis and abscess of trunk 6/27/2017    Depression     Hyperlipidemia LDL goal <100 10/31/2010    Hypertension goal BP (blood pressure) < 140/90 3/17/2011    Hypothyroidism 1/12/2010    Morbid obesity due to excess  calories (H) 1/12/2010    Neuropathy in diabetes (H) 1/12/2010    Obesity 1/12/2010    Sleep apnea 1/12/2010    CPAP    Type 2 diabetes, HbA1C goal < 8% (H) 3/8/2011     Past Surgical History:   Procedure Laterality Date    BIOPSY      COLONOSCOPY      EYE SURGERY      GENITOURINARY SURGERY      surg for undescended testicle    IRRIGATION AND DEBRIDEMENT HAND, COMBINED Right 12/23/2022    Procedure: Right long finger irrigation and debridement with partial amputation of the right long finger. ;  Surgeon: Colby English MD;  Location: [facility] OR    REPAIR HAMMER TOE BILATERAL  5/16/2013    Procedure: REPAIR HAMMER TOE BILATERAL;  Flexor Tenotomy Toes 2,3,4,5 Bilateral Feet;  Surgeon: Saad Bangura DPM;  Location: [facility] OR     Family Hx:  Family History   Problem Relation Age of Onset    Breast Cancer Mother     Hypertension Mother     Thyroid Disease Mother     Depression Mother     Alzheimer Disease Mother 82    Cancer - colorectal Father     Thyroid Disease Father     Depression Father     Other - See Comments Father         bladder polyps    Heart Disease Maternal Grandmother         CHF    Circulatory Paternal Grandmother     Cancer Paternal Grandfather     Diabetes Brother     Diabetes Brother     Asthma Brother      Thyroid disease: Yes: mother          DM2: Yes: one brother with type 1 diabetes and one brother with type 2 diabetes, paternal aunt with DM2          Autoimmune: DM1, SLE, RA, Vitiligo Yes: brother with DM1    Social Hx:  Social History     Socioeconomic History    Marital status:      Spouse name: Sri    Number of children: 2    Years of education: Not on file    Highest education level: Associate degree: occupational, technical, or vocational program   Occupational History    Occupation:      Employer: NONE      Comment: [employer]   Tobacco Use    Smoking status: Never    Smokeless tobacco: Never   Vaping Use    Vaping Use: Never used   Substance and Sexual Activity     Alcohol use: Yes     Comment: Occassionally    Drug use: No    Sexual activity: Not Currently     Partners: Female     Birth control/protection: Abstinence   Other Topics Concern    Parent/sibling w/ CABG, MI or angioplasty before 65F 55M? No   Social History Narrative    Not on file     Social Determinants of Health     Financial Resource Strain: Low Risk  (6/25/2023)    Overall Financial Resource Strain (CARDIA)     Difficulty of Paying Living Expenses: Not hard at all   Food Insecurity: No Food Insecurity (6/25/2023)    Hunger Vital Sign     Worried About Running Out of Food in the Last Year: Never true     Ran Out of Food in the Last Year: Never true   Transportation Needs: No Transportation Needs (6/25/2023)    PRAPARE - Transportation     Lack of Transportation (Medical): No     Lack of Transportation (Non-Medical): No   Physical Activity: Inactive (6/25/2023)    Exercise Vital Sign     Days of Exercise per Week: 0 days     Minutes of Exercise per Session: 0 min   Stress: Stress Concern Present (6/25/2023)    [employer] - Occupational Stress Questionnaire     Feeling of Stress : To some extent   Social Connections: Moderately Isolated (6/25/2023)    Social Connection and Isolation Panel [NHANES]     Frequency of Communication with Friends and Family: Once a week     Frequency of Social Gatherings with Friends and Family: Once a week     Attends Nondenominational Services: 1 to 4 times per year     Active Member of Clubs or Organizations: No     Attends Club or Organization Meetings: Not on file     Marital Status:    Intimate Partner Violence: Not on file   Housing Stability: Low Risk  (6/25/2023)    Housing Stability Vital Sign     Unable to Pay for Housing in the Last Year: No     Number of Places Lived in the Last Year: 1     Unstable Housing in the Last Year: No          MEDICATIONS:  has a current medication list which includes the following prescription(s): acetaminophen,  "aspirin, atenolol, bupropion, calcium carb-cholecalciferol, freestyle juan 2 reader, freestyle juan 2 sensor, cyanocobalamin, ferrous sulfate, finasteride, glipizide, ibuprofen, insulin glargine, latanoprost, levoxyl, losartan, metformin, mirtazapine, multi-vitamin, mupirocin, pantoprazole, simvastatin, urea, and cholecalciferol.    ROS     ROS: 10 point ROS neg other than the symptoms noted above in the HPI.    PE:  BP (!) 160/75 (BP Location: Left arm, Patient Position: Chair, Cuff Size: Adult Regular)   Pulse 78   Temp 97.3  F (36.3  C) (Tympanic)   Resp 18   Ht 1.651 m (5' 5\")   Wt 108.1 kg (238 lb 4.8 oz)   SpO2 99%   BMI 39.66 kg/m    GENERAL: healthy, alert and no distress  EYES: Eyes grossly normal to inspection, conjunctivae and sclerae normal  ENT: no nose swelling, nasal discharge.  Thyroid: no apparent thyroid nodules  RESP: no audible wheeze, cough, or visible cyanosis.  No visible retractions or increased work of breathing.  Able to speak fully in complete sentences.  ABDO: not evaluated.  EXTREMITIES: no hand tremors.  NEURO: Cranial nerves grossly intact, mentation intact and speech normal  SKIN: No apparent skin lesions, rash or edema seen   PSYCH: mentation appears normal, affect normal/bright, judgement and insight intact, normal speech and appearance well-groomed.    LABS:    Component      Latest Ref Rng & Units 8/29/2019   Glucose 316   C-Peptide      0.9 - 6.9 ng/mL 2.5     A1c:  Lab Results   Component Value Date    A1C 9.4 08/31/2023    A1C 7.6 05/02/2023    A1C 8.1 01/27/2023    A1C 9.3 01/07/2023    A1C 9.2 09/01/2022    A1C 7.4 02/22/2021    A1C 9.9 03/12/2020    A1C 11.7 12/05/2019    A1C 11.6 08/29/2019    A1C 11.8 12/14/2018     Basic Metabolic Panel:  Creatinine   Date Value Ref Range Status   08/31/2023 1.45 (H) 0.67 - 1.17 mg/dL Final   01/16/2020 1.01 0.66 - 1.25 mg/dL Final     Urine microabumin:  Lab Results   Component Value Date    UMALCR 116.17 02/06/2023    UMALCR " 120.00 07/30/2021    UMALCR 81.46 03/05/2020          LFTS/Cholesterol Panel:  Recent Labs   Lab Test 01/27/23  1126 01/08/23  0549 05/19/16  0804 05/21/15  1104   CHOL 127 114   < > 137   HDL 33* 30*   < > 30*   LDL 71 64   < > 78   TRIG 113 99   < > 144   CHOLHDLRATIO  --   --   --  4.6    < > = values in this interval not displayed.       Thyroid Function:  ENDO THYROID LABS[facility] Latest Ref Rng & Units 1/27/2023   THYROID STIMULATING HORMONE (R) 0.30 - 4.20 uIU/mL 0.78   FREE T3 2.3 - 4.2 pg/mL    FREE T4 0.90 - 1.70 ng/dL 1.65     Component    Latest Ref Rng & Units 10/19/2018   Thyroid Peroxidase Antibody    <35 IU/mL 11   Thyroglobulin Antibody    <40 IU/mL <20   Thyroid Stim Immunog    <=1.3 TSI index <1.0     All pertinent notes, labs, and images personally reviewed by me.     A/P  Mr.Walter Morgan is a 62 year old evaluated via phone visit for the management of:    1. DM2 - Under poor control. A1c 9.4%.  Diabetes is complicated by neuropathy and nephropathy/elevated urine microalbumin.  He is followed by nephrology.  Can't test his blood sugars using a meter due to the above and will benefit from use of continuous glucose monitor.  No recent blood sugar data toreview as he is not using juan.  Plan:  Discussed diagnosis, pathophysiology, management and treatment options of condition with pt.  I also discussed importance of strict blood sugar control to prevent complications associated with uncontrolled diabetes.    Continue Metformin 1000 mg twice a day  Continue Glipizide XL 10 mg/day in AM with food  Increase Lantus to 40 units at night.  Start using juan 2 as soon as possible. Scan often.  Let us know if need juan 3 Rx. (Sample given)    Follow up Cara SMALLS in 6 weeks  Repeat labs and follow up in 3-4 months  Please make a lab appointment for blood work and follow up clinic appointment in 1 week after that to discuss results.      2. Hypothyroidism.  Currently treated with Levoxyl (KAMERON) 137  mcg/day.  Clinically and biochemically euthyroid.   1/2023 Labs in range.  Plan:  Discussed diagnosis, pathophysiology, management and treatment options of condition with pt.  Continue Levoxyl 137 mcg/day.   Labs in 1 year (1/2024 onwards) or sooner if concerns.    Discussed s/s of hypothyroidism and hyperthyroidism to watch for.  The patient indicates understanding of these issues and agrees with the plan.    3. Hyperlipidemia - On statin therapy. Managed by PCP. Not discussed.    4. Prevention:  Opthalmology-Yes: recommend annually  Dental-Yes:recommend twice a year  ASA-Yes, 81 mg qd   Smoking- No    Most Recent Immunizations   Administered Date(s) Administered    COVID-19 Bivalent 12+ (Pfizer) 06/30/2023    COVID-19 MONOVALENT 12+ (Pfizer) 10/15/2022    COVID-19 Monovalent Booster 18+ (Moderna) 04/12/2022    COVID-19 Vaccine (Vi) 03/25/2021    DTaP, Unspecified 11/08/2010    Flu, Unspecified 11/15/2016    [id number] Novel Flu 11/04/2009    Hepatitis A (ADULT 19+) 04/17/2019    Influenza (H1N1) 11/04/2009    Influenza (IIV3) PF 10/01/2012    Influenza Vaccine 18-64 (Flublok) 10/15/2022    Influenza Vaccine >6 months (Alfuria,Fluzone) 09/07/2017    Influenza Vaccine, 6+MO IM (QUADRIVALENT W/PRESERVATIVES) 11/15/2016    MMR 03/21/1995    Pneumococcal (PCV 7) 06/15/2022    Pneumococcal 20 valent Conjugate (Prevnar 20) 06/15/2022    Pneumococcal 23 valent 10/05/2012    TD,PF 7+ (Tenivac) 03/21/1995    TDAP (Adacel,Boostrix) 09/24/2021    TDAP Vaccine (Adacel) 11/08/2010    Typhoid IM 10/16/2018    Zoster recombinant adjuvanted (SHINGRIX) 12/03/2020     Follow-up:  As noted in AVS    Cindi Meraz MD  Endocrinology   [facility]  CC: Lisa Pierre    Answers submitted by the patient for this visit:  Symptoms you have experienced in the last 30 days (Submitted on 9/6/2023)  General Symptoms: No  Skin Symptoms: No  HENT Symptoms: Yes  EYE SYMPTOMS: No  HEART SYMPTOMS: No  LUNG SYMPTOMS:  No  INTESTINAL SYMPTOMS: No  URINARY SYMPTOMS: No  REPRODUCTIVE SYMPTOMS: No  SKELETAL SYMPTOMS: No  BLOOD SYMPTOMS: No  NERVOUS SYSTEM SYMPTOMS: Yes  MENTAL HEALTH SYMPTOMS: Yes  Please answer the questions below to tell us what conditions you are experiencing: (Submitted on 9/6/2023)  Ear pain: No  Ear discharge: Yes  Hearing loss: No  Tinnitus: Yes  Nosebleeds: No  Congestion: No  Sinus pain: No  Trouble swallowing: No   Voice hoarseness: No  Mouth sores: No  Sore throat: No  Tooth pain: No  Gum tenderness: No  Bleeding gums: No  Change in taste: No  Change in sense of smell: No  Dry mouth: Yes  Hearing aid used: No  Neck lump: No  Please answer the questions below to tell us what condition you are experiencing: (Submitted on 9/6/2023)  Trouble with coordination: No  Dizziness or trouble with balance: No  Fainting or black-out spells: No  Memory loss: Yes  Headache: No  Seizures: No  Speech problems: No  Tingling: No  Tremor: Yes  Weakness: No  Difficulty walking: No  Paralysis: No  Numbness: No  Please answer the questions below to tell us what condition you are experiencing: (Submitted on 9/6/2023)  Nervous or Anxious: No  Depression: Yes  Trouble sleeping: No  Trouble thinking or concentrating: No  Mood changes: No  Panic attacks: No

## 2023-09-06 NOTE — LETTER
"    9/6/2023         RE: Steve Morgan  [address]  [facility] [address]        Dear Colleague,    Thank you for referring your patient, Steve Morgan, to [facility]. Please see a copy of my visit note below.    Name: Steve \"Caio\" Cathy  F/u for Diabetes and hypothyroidism.  HPI:  For f/u of DM.    has a past medical history of Cellulitis and abscess of trunk (6/27/2017), Depression, Hyperlipidemia LDL goal <100 (10/31/2010), Hypertension goal BP (blood pressure) < 140/90 (3/17/2011), Hypothyroidism (1/12/2010), Morbid obesity due to excess calories (H) (1/12/2010), Neuropathy in diabetes (H) (1/12/2010), Obesity (1/12/2010), Sleep apnea (1/12/2010), and Type 2 diabetes, HbA1C goal < 8% (H) (3/8/2011).    He has challenges with blood sugar testing due to limited use of his hands (neuropathy+ contractures + tremor - both significant) and increasing difficulty with ADL's.  Can't test his blood sugars using a meter due to the above.   He also has an hard time inserting the Nina sensors due to neuropathy and tremor.  History of poor compliance in past.  Was started on Novolog in past but not currently taking it.  He has stopping drinking pop since Sep 2021.  Exercise is limited.  CKD stage 2- followed by nephrology.  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  He underwent I&D and he was on partial amputation.  Earlier noted mild hyperkalemia which since has resolved.    Was using nina but currently not using. He has supplies at home.  Diet- not focused on ADA diet  He was on Jardaince-- not taking as it was expensive. He reports that other SGLT-2 inh are also expensive.  History of intermittent hyperkalemia.   Wt Readings from Last 2 Encounters:   09/06/23 108.1 kg (238 lb 4.8 oz)   07/11/23 106.1 kg (234 lb)     1. Type 2 DM:  Orginally diagnosed at the age of 33 or 38.  Was prediabetic prior, was not particularly symptomatic at the " time, was noted on routine lab work    Current Regimen:   Metformin 1000 mg BID  Glipizide XL 10 mg/day in AM with food  Lantus 35 units at night    The plan was to start Trulicity but the copay was not affordable ($100 per refill).  Jardiance was expensive as noted above.  Reports that other SGLT2 inh are also not covered by insurance.    yes:     Diabetes Medication(s)       Biguanides       metFORMIN (GLUCOPHAGE) 1000 MG tablet    Take 1 tablet (1,000 mg) by mouth 2 times daily (with meals)      Insulin       insulin glargine (BASAGLAR KWIKPEN) 100 UNIT/ML pen    Inject 35 Units Subcutaneous daily      Sulfonylureas       glipiZIDE (GLUCOTROL XL) 10 MG 24 hr tablet    Take 1 tablet (10 mg) by mouth daily          BS checks: [employer] Download: Blood glucose data reviewed personally. See nursing note from this encounter for details.  Unable to use glucose meter - he has a hard time doing fingerstick testing due to his tremor.    Complications:   Diabetes Complications  Description / Detail    Diabetic Retinopathy  No retinopathy,last exam 2/2020, [facility]   CAD / PAD  No   Neuropathy  Yes + diabetic neuropathy: numbness hands and feet.  Saw podiatry, Dr. Mathis, 1/27/2018- using diabetic shoes   Nephropathy / Microalbuminuria  Yes, elevated urine microalbumin   Gastroparesis  No   Hypoglycemia [place]  Not sure, gets 'kind of dizzy' when blood sugar is low but reports history of low blood sugars without symptoms     2. Hypertension: Blood Pressure:   BP Readings from Last 3 Encounters:   09/06/23 (!) 160/75   07/11/23 132/76   06/30/23 132/58   Blood pressure medications include atenolol 25 mg qd and losartan 50 mg every day.   3. Hyperlipidemia: Takes simvastatin 20 mg for lipid control.       4.  Hypothyroidism. Currently treated with levoxyl (KAMERON) 137 mcg daily. Takes the levoxyl first thing in the morning and waits 30+ minutes before eating breakfast.   1/2023 labs in range.  PMH/PSH:  Past Medical  History:   Diagnosis Date     Cellulitis and abscess of trunk 6/27/2017     Depression      Hyperlipidemia LDL goal <100 10/31/2010     Hypertension goal BP (blood pressure) < 140/90 3/17/2011     Hypothyroidism 1/12/2010     Morbid obesity due to excess calories (H) 1/12/2010     Neuropathy in diabetes (H) 1/12/2010     Obesity 1/12/2010     Sleep apnea 1/12/2010    CPAP     Type 2 diabetes, HbA1C goal < 8% (H) 3/8/2011     Past Surgical History:   Procedure Laterality Date     BIOPSY       COLONOSCOPY       EYE SURGERY       GENITOURINARY SURGERY      surg for undescended testicle     IRRIGATION AND DEBRIDEMENT HAND, COMBINED Right 12/23/2022    Procedure: Right long finger irrigation and debridement with partial amputation of the right long finger. ;  Surgeon: Colby English MD;  Location: [facility] OR     REPAIR HAMMER TOE BILATERAL  5/16/2013    Procedure: REPAIR HAMMER TOE BILATERAL;  Flexor Tenotomy Toes 2,3,4,5 Bilateral Feet;  Surgeon: Saad Bangura DPM;  Location: [facility] OR     Family Hx:  Family History   Problem Relation Age of Onset     Breast Cancer Mother      Hypertension Mother      Thyroid Disease Mother      Depression Mother      Alzheimer Disease Mother 82     Cancer - colorectal Father      Thyroid Disease Father      Depression Father      Other - See Comments Father         bladder polyps     Heart Disease Maternal Grandmother         CHF     Circulatory Paternal Grandmother      Cancer Paternal Grandfather      Diabetes Brother      Diabetes Brother      Asthma Brother      Thyroid disease: Yes: mother          DM2: Yes: one brother with type 1 diabetes and one brother with type 2 diabetes, paternal aunt with DM2          Autoimmune: DM1, SLE, RA, Vitiligo Yes: brother with DM1    Social Hx:  Social History     Socioeconomic History     Marital status:      Spouse name: Sri     Number of children: 2     Years of education: Not on file     Highest education level: Associate degree:  occupational, technical, or vocational program   Occupational History     Occupation:      Employer: NONE      Comment: [employer]   Tobacco Use     Smoking status: Never     Smokeless tobacco: Never   Vaping Use     Vaping Use: Never used   Substance and Sexual Activity     Alcohol use: Yes     Comment: Occassionally     Drug use: No     Sexual activity: Not Currently     Partners: Female     Birth control/protection: Abstinence   Other Topics Concern     Parent/sibling w/ CABG, MI or angioplasty before 65F 55M? No   Social History Narrative     Not on file     Social Determinants of Health     Financial Resource Strain: Low Risk  (6/25/2023)    Overall Financial Resource Strain (CARDIA)      Difficulty of Paying Living Expenses: Not hard at all   Food Insecurity: No Food Insecurity (6/25/2023)    Hunger Vital Sign      Worried About Running Out of Food in the Last Year: Never true      Ran Out of Food in the Last Year: Never true   Transportation Needs: No Transportation Needs (6/25/2023)    PRAPARE - Transportation      Lack of Transportation (Medical): No      Lack of Transportation (Non-Medical): No   Physical Activity: Inactive (6/25/2023)    Exercise Vital Sign      Days of Exercise per Week: 0 days      Minutes of Exercise per Session: 0 min   Stress: Stress Concern Present (6/25/2023)    [employer] - Occupational Stress Questionnaire      Feeling of Stress : To some extent   Social Connections: Moderately Isolated (6/25/2023)    Social Connection and Isolation Panel [NHANES]      Frequency of Communication with Friends and Family: Once a week      Frequency of Social Gatherings with Friends and Family: Once a week      Attends Religion Services: 1 to 4 times per year      Active Member of Clubs or Organizations: No      Attends Club or Organization Meetings: Not on file      Marital Status:    Intimate Partner Violence: Not on file   Housing Stability: Low  "Risk  (6/25/2023)    Housing Stability Vital Sign      Unable to Pay for Housing in the Last Year: No      Number of Places Lived in the Last Year: 1      Unstable Housing in the Last Year: No          MEDICATIONS:  has a current medication list which includes the following prescription(s): acetaminophen, aspirin, atenolol, bupropion, calcium carb-cholecalciferol, freestyle juan 2 reader, freestyle juan 2 sensor, cyanocobalamin, ferrous sulfate, finasteride, glipizide, ibuprofen, insulin glargine, latanoprost, levoxyl, losartan, metformin, mirtazapine, multi-vitamin, mupirocin, pantoprazole, simvastatin, urea, and cholecalciferol.    ROS     ROS: 10 point ROS neg other than the symptoms noted above in the HPI.    PE:  BP (!) 160/75 (BP Location: Left arm, Patient Position: Chair, Cuff Size: Adult Regular)   Pulse 78   Temp 97.3  F (36.3  C) (Tympanic)   Resp 18   Ht 1.651 m (5' 5\")   Wt 108.1 kg (238 lb 4.8 oz)   SpO2 99%   BMI 39.66 kg/m    GENERAL: healthy, alert and no distress  EYES: Eyes grossly normal to inspection, conjunctivae and sclerae normal  ENT: no nose swelling, nasal discharge.  Thyroid: no apparent thyroid nodules  RESP: no audible wheeze, cough, or visible cyanosis.  No visible retractions or increased work of breathing.  Able to speak fully in complete sentences.  ABDO: not evaluated.  EXTREMITIES: no hand tremors.  NEURO: Cranial nerves grossly intact, mentation intact and speech normal  SKIN: No apparent skin lesions, rash or edema seen   PSYCH: mentation appears normal, affect normal/bright, judgement and insight intact, normal speech and appearance well-groomed.    LABS:    Component      Latest Ref Rng & Units 8/29/2019   Glucose 316   C-Peptide      0.9 - 6.9 ng/mL 2.5     A1c:  Lab Results   Component Value Date    A1C 9.4 08/31/2023    A1C 7.6 05/02/2023    A1C 8.1 01/27/2023    A1C 9.3 01/07/2023    A1C 9.2 09/01/2022    A1C 7.4 02/22/2021    A1C 9.9 03/12/2020    A1C 11.7 " 12/05/2019    A1C 11.6 08/29/2019    A1C 11.8 12/14/2018     Basic Metabolic Panel:  Creatinine   Date Value Ref Range Status   08/31/2023 1.45 (H) 0.67 - 1.17 mg/dL Final   01/16/2020 1.01 0.66 - 1.25 mg/dL Final     Urine microabumin:  Lab Results   Component Value Date    UMALCR 116.17 02/06/2023    UMALCR 120.00 07/30/2021    UMALCR 81.46 03/05/2020          LFTS/Cholesterol Panel:  Recent Labs   Lab Test 01/27/23  1126 01/08/23  0549 05/19/16  0804 05/21/15  1104   CHOL 127 114   < > 137   HDL 33* 30*   < > 30*   LDL 71 64   < > 78   TRIG 113 99   < > 144   CHOLHDLRATIO  --   --   --  4.6    < > = values in this interval not displayed.       Thyroid Function:  END[facility] THYROID LABS[facility] Latest Ref Rng & Units 1/27/2023   THYROID STIMULATING HORMONE (R) 0.30 - 4.20 uIU/mL 0.78   FREE T3 2.3 - 4.2 pg/mL    FREE T4 0.90 - 1.70 ng/dL 1.65     Component    Latest Ref Rng & Units 10/19/2018   Thyroid Peroxidase Antibody    <35 IU/mL 11   Thyroglobulin Antibody    <40 IU/mL <20   Thyroid Stim Immunog    <=1.3 TSI index <1.0     All pertinent notes, labs, and images personally reviewed by me.     A/P  Mr.Walter Morgan is a 62 year old evaluated via phone visit for the management of:    1. DM2 - Under poor control. A1c 9.4%.  Diabetes is complicated by neuropathy and nephropathy/elevated urine microalbumin.  He is followed by nephrology.  Can't test his blood sugars using a meter due to the above and will benefit from use of continuous glucose monitor.  No recent blood sugar data toreview as he is not using juan.  Plan:  Discussed diagnosis, pathophysiology, management and treatment options of condition with pt.  I also discussed importance of strict blood sugar control to prevent complications associated with uncontrolled diabetes.    Continue Metformin 1000 mg twice a day  Continue Glipizide XL 10 mg/day in AM with food  Increase Lantus to 40 units at night.  Start using juan 2 as soon as possible. Scan often.  Let us  know if need juan 3 Rx. (Sample given)    Follow up Cara Stauffer PAC in 6 weeks  Repeat labs and follow up in 3-4 months  Please make a lab appointment for blood work and follow up clinic appointment in 1 week after that to discuss results.      2. Hypothyroidism.  Currently treated with Levoxyl (KAMERON) 137 mcg/day.  Clinically and biochemically euthyroid.   1/2023 Labs in range.  Plan:  Discussed diagnosis, pathophysiology, management and treatment options of condition with pt.  Continue Levoxyl 137 mcg/day.   Labs in 1 year (1/2024 onwards) or sooner if concerns.    Discussed s/s of hypothyroidism and hyperthyroidism to watch for.  The patient indicates understanding of these issues and agrees with the plan.    3. Hyperlipidemia - On statin therapy. Managed by PCP. Not discussed.    4. Prevention:  Opthalmology-Yes: recommend annually  Dental-Yes:recommend twice a year  ASA-Yes, 81 mg qd   Smoking- No    Most Recent Immunizations   Administered Date(s) Administered     COVID-19 Bivalent 12+ (Pfizer) 06/30/2023     COVID-19 MONOVALENT 12+ (Pfizer) 10/15/2022     COVID-19 Monovalent Booster 18+ (Moderna) 04/12/2022     COVID-19 Vaccine (Vi) 03/25/2021     DTaP, Unspecified 11/08/2010     Flu, Unspecified 11/15/2016     [id number] Novel Flu 11/04/2009     Hepatitis A (ADULT 19+) 04/17/2019     Influenza (H1N1) 11/04/2009     Influenza (IIV3) PF 10/01/2012     Influenza Vaccine 18-64 (Flublok) 10/15/2022     Influenza Vaccine >6 months (Alfuria,Fluzone) 09/07/2017     Influenza Vaccine, 6+MO IM (QUADRIVALENT W/PRESERVATIVES) 11/15/2016     MMR 03/21/1995     Pneumococcal (PCV 7) 06/15/2022     Pneumococcal 20 valent Conjugate (Prevnar 20) 06/15/2022     Pneumococcal 23 valent 10/05/2012     TD,PF 7+ (Tenivac) 03/21/1995     TDAP (Adacel,Boostrix) 09/24/2021     TDAP Vaccine (Adacel) 11/08/2010     Typhoid IM 10/16/2018     Zoster recombinant adjuvanted (SHINGRIX) 12/03/2020     Follow-up:  As noted in AVS    Cindi  MD Mariya  Endocrinology   [facility]  CC: Lisa Pierre    Answers submitted by the patient for this visit:  Symptoms you have experienced in the last 30 days (Submitted on 9/6/2023)  General Symptoms: No  Skin Symptoms: No  HENT Symptoms: Yes  EYE SYMPTOMS: No  HEART SYMPTOMS: No  LUNG SYMPTOMS: No  INTESTINAL SYMPTOMS: No  URINARY SYMPTOMS: No  REPRODUCTIVE SYMPTOMS: No  SKELETAL SYMPTOMS: No  BLOOD SYMPTOMS: No  NERVOUS SYSTEM SYMPTOMS: Yes  MENTAL HEALTH SYMPTOMS: Yes  Please answer the questions below to tell us what conditions you are experiencing: (Submitted on 9/6/2023)  Ear pain: No  Ear discharge: Yes  Hearing loss: No  Tinnitus: Yes  Nosebleeds: No  Congestion: No  Sinus pain: No  Trouble swallowing: No   Voice hoarseness: No  Mouth sores: No  Sore throat: No  Tooth pain: No  Gum tenderness: No  Bleeding gums: No  Change in taste: No  Change in sense of smell: No  Dry mouth: Yes  Hearing aid used: No  Neck lump: No  Please answer the questions below to tell us what condition you are experiencing: (Submitted on 9/6/2023)  Trouble with coordination: No  Dizziness or trouble with balance: No  Fainting or black-out spells: No  Memory loss: Yes  Headache: No  Seizures: No  Speech problems: No  Tingling: No  Tremor: Yes  Weakness: No  Difficulty walking: No  Paralysis: No  Numbness: No  Please answer the questions below to tell us what condition you are experiencing: (Submitted on 9/6/2023)  Nervous or Anxious: No  Depression: Yes  Trouble sleeping: No  Trouble thinking or concentrating: No  Mood changes: No  Panic attacks: No      Again, thank you for allowing me to participate in the care of your patient.        Sincerely,        Cindi Meraz MD

## 2023-09-07 ENCOUNTER — VIRTUAL VISIT (OUTPATIENT)
Dept: PSYCHIATRY | Facility: CLINIC | Age: 63
End: 2023-09-07
Payer: MEDICARE

## 2023-09-07 DIAGNOSIS — F33.1 MAJOR DEPRESSIVE DISORDER, RECURRENT EPISODE, MODERATE (H): Primary | ICD-10-CM

## 2023-09-07 PROCEDURE — 99214 OFFICE O/P EST MOD 30 MIN: CPT | Mod: VID | Performed by: PSYCHIATRY & NEUROLOGY

## 2023-09-07 RX ORDER — BUPROPION HYDROCHLORIDE 300 MG/1
300 TABLET ORAL EVERY MORNING
Qty: 90 TABLET | Refills: 0 | Status: SHIPPED | OUTPATIENT
Start: 2023-09-07 | End: 2023-11-16

## 2023-09-07 RX ORDER — MIRTAZAPINE 30 MG/1
30 TABLET, FILM COATED ORAL AT BEDTIME
Qty: 30 TABLET | Refills: 3 | Status: SHIPPED | OUTPATIENT
Start: 2023-09-07 | End: 2023-11-16

## 2023-09-07 ASSESSMENT — PAIN SCALES - GENERAL: PAINLEVEL: NO PAIN (0)

## 2023-09-07 NOTE — PROGRESS NOTES
Virtual Visit Details    Type of service:  Video Visit   Video Start Time: 1:40 PM  Video End Time:1:53 PM    Originating Location (pt. Location): Home    Distant Location (provider location):  Off-site  Platform used for Video Visit: [facility] Consult Note    IDENTIFICATION   Name: Steve Morgan   : 1960/63 year old      Sex:    @ male          Telemedicine Visit: The patient's condition can be safely assessed and treated via synchronous audio and visual telemedicine encounter.        Face to Face/patient Contact total time: 13 minutes  Pre Charting time: 0 minutes; Post charting time, communication and other activities: 1 minutes; Total time 14 minutes            SUBJECTIVE   Pt doing good. Mood better with increase in mirtazapine to 30 mg - effective. Occasional weird dreams as only side effect. He continues to take citalopram.  Undergo psychotherapy.        PHQ-9 scores:      2023     8:20 AM 2023     7:46 AM 2023     2:13 PM   PHQ-9 SCORE   PHQ-9 Total Score MyChart 7 (Mild depression) 14 (Moderate depression) 10 (Moderate depression)   PHQ-9 Total Score 7 14 10       JOSE MANUEL-7 scores:      2023    10:03 AM 3/10/2023     1:48 PM 2023     8:21 AM   JOSE MANUEL-7 SCORE   Total Score 2 (minimal anxiety) 5 (mild anxiety) 3 (minimal anxiety)   Total Score 2 5 3       OBJECTIVE     Vital Signs:   There were no vitals taken for this visit.    Labs:  N/A    Current Medications:  Current Outpatient Medications   Medication    acetaminophen (TYLENOL) 325 MG tablet    ASPIRIN 81 MG OR TABS    atenolol (TENORMIN) 25 MG tablet    buPROPion (WELLBUTRIN XL) 300 MG 24 hr tablet    Calcium Carb-Cholecalciferol (CALCIUM 600 + D PO)    Continuous Blood Gluc  (FREESTYLE GIULIANA 2 READER) SIENA    Continuous Blood Gluc Sensor (FREESTYLE GIULIANA 2 SENSOR) MISC    Cyanocobalamin (VITAMIN B 12 PO)    ferrous sulfate 325 (65 FE) MG tablet    finasteride (PROSCAR) 5 MG tablet     glipiZIDE (GLUCOTROL XL) 10 MG 24 hr tablet    ibuprofen (ADVIL/MOTRIN) 200 MG tablet    insulin glargine (BASAGLAR KWIKPEN) 100 UNIT/ML pen    latanoprost (XALATAN) 0.005 % ophthalmic solution    LEVOXYL 137 MCG tablet    losartan (COZAAR) 100 MG tablet    metFORMIN (GLUCOPHAGE) 1000 MG tablet    mirtazapine (REMERON) 30 MG tablet    MULTI-VITAMIN OR TABS    mupirocin (BACTROBAN) 2 % external ointment    pantoprazole (PROTONIX) 40 MG EC tablet    simvastatin (ZOCOR) 20 MG tablet    Urea (URE-NA) 15 g PACK packet    VITAMIN D, CHOLECALCIFEROL, PO     No current facility-administered medications for this visit.        The [place] Prescription Monitoring Program has been reviewed and there are no concerns about diversionary activity for controlled substances at this time.        ADDED HISTORY   na    MENTAL STATUS EXAMINATION:   Appearance: intact attention to grooming and hygiene  Attitude:  cooperative   Eye Contact:  good  Gait and Station: sitting  Psychomotor Behavior: Within normal limits  Oriented to:  Grossly person place and time  Attention Span and Concentration:  Grossly intact  Speech:  normal tone  Language: english  Mood: Good  Affect: Euthymic  Associations:  no loose associations  Thought Process:  logical, linear and goal oriented  Thought Content:  No evidence of delusions or suicidal or homicidal ideation plan or intent  Memory: grossly intact  Fund of Knowledge: Good  Insight:  good  Judgment:  intact, adequate for safety  Impulse Control:  intact        DIAGNOSES:   Major Depressive Disorder, moderate, recurrent  SIADH/hyponatremia      ASSESSMENT:   Patient with history of depression, and considerable mood difficulties without prior combo of citalopram/bupropion affecting relationships negatively. Has comorbid diagnoses including SIADH/hyponatermia (possibly worse with selective serotonin reuptake inhibitor) historically, DM, obstructive sleep apnea, b12 deficiency.     Per literature review  mirtazapine is less likely to cause hyponatremia, and for current eGFR no dosage necessary. Will pursue.    Today Steve Morgan reports no suicidal ideations. In addition, he has notable risk factors for self-harm, including age and comorbid medical conditions. However, risk is mitigated by commitment to family, Islam beliefs and absence of past attempts. Therefore, based on all available evidence including the factors cited above, he does not appear to be at imminent risk for self-harm, does not meet criteria for a 72-hr hold, and therefore remains appropriate for ongoing outpatient level of care.       PLAN:     Patient advised of consultative model. Patient will continue to be seen for ongoing consultation and stabilization.  Does not meet criteria for involuntary treatment or hospitalization  Add mirtazapine 5/25/23 7.5 mg po at bedtime x 3 nights then 15 mg po at bedtime slight benefit for anger=> 7/27/23 30 mg at bedtime efficacy with occasional-Risks, benefits and alternatives discussed.  Patient provides verbal consent to treatment.  Continue bupropion 150 mg po bid -Risks, benefits and alternatives discussed.  Patient provides verbal consent to treatment.  DC'd citalopram (hyponatremia/SIADH)  Labs - reviewed; follow-up CMP in 10 weeks  Referred for MTM referral, priority on guidance for psychotropic selection and risk of hyponatremia  Therapy with Veronica GUILLAUME  Return in 4-10 weeks    Administrative Billing:   Time spent with patient was greater than 50% of time and/or significant time was spent in counseling and coordination of care regarding above diagnoses and treatment plan. Pre charting time and post charting time/documentation/coordination are done on date of service.      Signed:   Anant Pinto M.D.  [facility] Psychiatry Service    Disclaimer: This note consists of symbols derived from keyboarding, dictation and/or voice recognition software. As a result, there may be errors in the script  that have gone undetected. Please consider this when interpreting information found in this chart.

## 2023-09-07 NOTE — NURSING NOTE
Is the patient currently in the state of [place]? YES    Visit mode:VIDEO    If the visit is dropped, the patient can be reconnected by: VIDEO VISIT: Text to cell phone:   Telephone Information:   Mobile [phone]       Will anyone else be joining the visit? NO  (If patient encounters technical issues they should call [phone]    How would you like to obtain your AVS? MyChart    Are changes needed to the allergy or medication list? No    Reason for visit: RECHECK    Justina GORDILLO

## 2023-09-07 NOTE — PATIENT INSTRUCTIONS
"For citalopram 20 mg, take 1/2 tablet daily for 30 days then completely discontinued taking citalopram    Patient Education   Collaborative Care Psychiatry Service  What to Expect  Here's what to expect from your Collaborative Care Psychiatry Service (CCPS).   About CCPS  CCPS means 2 people work together to help you get better. You'll meet with a behavioral health clinician and a psychiatric doctor. A behavioral health clinician helps people with mental health problems by talking with them. A psychiatric doctor helps people by giving them medicine.  How it works  At every visit, you'll see the behavioral health clinician (BHC) first. They'll talk with you about how you're doing and teach you how to feel better.   Then you'll see the psychiatric doctor. This doctor can help you deal with troubling thoughts and feelings by giving you medicine. They'll make sure you know the plan for your care.   CCPS usually takes 3 to 6 visits. If you need more visits, we may have you start seeing a different psychiatric doctor for ongoing care.  If you have any questions or concerns, we'll be glad to talk with you.  About visits  Be open  At your visits, please talk openly about your problems. It may feel hard, but it's the best way for us to help you.  Cancelling visits  If you can't come to your visit, please call us right away at [phone]. If you don't cancel at least 24 hours (1 full day) before your visit, that's \"late cancellation.\"  Being late to visits  Being very late is the same as not showing up. You will be a \"no show\" if:  Your appointment starts with a BHC, and you're more than 15 minutes late for a 30-minute (half hour) visit. This will also cancel your appointment with the psychiatric doctor.  Your appointment is with a psychiatric doctor only, and you're more than 15 minutes late for a 30-minute (half hour) visit.  Your appointment is with a psychiatric doctor only, and you're more than 30 minutes late for a " 60-minute (full hour) visit.  If you cancel late or don't show up 2 times within 6 months, we may end your care.   Getting help between visits  If you need help between visits, you can call us Monday to Friday from 8 a.m. to 4:30 p.m. at [phone].  Emergency care  Call 911 or go to the nearest emergency department if your life or someone else's life is in danger.  Call 248 anytime to reach the national Suicide and Crisis hotline.  Medicine refills  To refill your medicine, call your pharmacy. You can also call [facility]'s [facility] at [phone], Monday to Friday, 8 a.m. to 4:30 p.m. It can take 1 to 3 business days to get a refill.   Forms, letters, and tests  You may have papers to fill out, like FMLA, short-term disability, and workability. We can help you with these forms at your visits, but you must have an appointment. You may need more than 1 visit for this, to be in an intensive therapy program, or both.  Before we can give you medicine for ADHD, we may refer you to get tested for it or confirm it another way.  We may not be able to give you an emotional support animal letter.  We don't do mental health checks ordered by the court.   We don't do mental health testing, but we can refer you to get tested.   Thank you for choosing us for your care.  For informational purposes only. Not to replace the advice of your health care provider. Copyright   2022 [facility]. All rights reserved. [facility] [id number] - 12/22.

## 2023-09-12 NOTE — PROGRESS NOTES
"Physical  Therapy Progress Note       06/28/23 0500   Appointment Info   Signing clinician's name / credentials Kinjal Nunn, PT   Total/Authorized Visits 12   Visits Used 3/10   Medical Diagnosis Deficits in ADLs; neuropathy   PT Tx Diagnosis impaired gait and balance with risk for falls.   Precautions/Limitations falls   Quick Adds Certification   Progress Note/Certification   Start of Care Date 03/30/23   Onset of illness/injury or Date of Surgery 03/10/23   Therapy Frequency 1x/week   Predicted Duration 90 days   Certification date from 03/30/23   Certification date to 06/28/23   Progress Note Due Date 06/28/23   Progress Note Completed Date 06/28/23   GOALS   PT Goals 5   PT Goal 1   Goal Identifier HEP   Goal Description Caio will be independent in a home ex and activity program  to address his impairments and augment functional progress.   Goal Progress 6/28/23:  \"I haven't been good about this\"   5/23/23:  inconsistent.  Hasn't done elliptical in a few weeks, sporadic with other HEP.  at [facility], 12/12/22: reports poor compliance with exercise in the past. Access to: His spouse uses DVD exercise videos - he has some interest in doing this. Has never had a gym membership. Has access to an eliptical at home - has some interest in this. A larger family room and stairs for walking exercise. May have access to handwts (2-5 lbs).   Target Date 06/28/23   Date Met   (in progress)   PT Goal 2   Goal Identifier standing balance   Goal Description Caio will be able to stand statically without UE support x 60 sec to demonstrate improved static standing for ADLS.   Goal Progress 6/28/23:  see objective measures    5/23/23: see objective measures.   Target Date 06/28/23   Date Met 06/28/23   PT Goal 3   Goal Identifier Gait   Goal Description Caio will be able to ambulate distances of 30 feet with deviation less than 6 inches in either direction and without AD to demonstrate improved safety with household ambulation. " "  Goal Progress 6/28/23:   met with AFOs on.   5/23/23:  able to walk 30 feet with less than 6 inches deviation to each side, in 80 feet only 1 deviation more than 6 inches to right.   Target Date 06/28/23   Date Met 06/28/23   PT Goal 4   Goal Identifier TRANSFERS/5x sit <> stand:   Goal Description Pt to complete 5x sit <>  </= 30 sec without UE support and maintain stability to demonstrate improved LE strength and stability with transfers with progress towards dec risk for falls.   Goal Progress 6/28/2023:  partially met,  but uses hands on knees 2-3 times.   5/23/23:  23.6 sec.   used on hand on thigh 3/5 reps.   5/12/23 - 26.1 sec from 18\" chair, no hands.  Several steps to catch balance with each  rep.   Target Date 06/28/23   Date Met   (in progress)   PT Goal 5   Goal Identifier GAIT SPEED:   Goal Description Pt to demonstrate significantly improved comfortable gait speed with SPC to >/= 1.0 m/s towards dec risk for falls.   Goal Progress 6/28/2023:  almost met - 0.94 m/s.  with AFOs and no AD  5/23/23:  amb 25 feet in 8.8 sec no AD.   Target Date 06/28/23   Date Met   (nearly met. continue)   Subjective Report   Subjective Report AFOs going well.   Has worn them for up t o 6-7 hours.  some trouble with the velcro straps.  no redness/ pain/irritation.   Objective Measure 3   Objective Measure rhomberg   Details with AFOs - 1:35 min   Objective Measure 4   Objective Measure 5 x STS   Details with AFOs , 2 trials - 19.5 sec, 2nd trial 23.5 sec but with empahsis on controlled motion with slowed him down.   Objective Measure 5   Objective Measure 25 foot walk   Details 8.1 sec,  8.4 sec 14 steps each.  with AFOs, no cane.  (0.94 m/s)   Objective Measure 6   Objective Measure static standing   Details with AFOs - 1:30 min   Vitals Signs   Blood Pressure 120/72  (HR 75)   Therapeutic Procedure/Exercise   Therapeutic Procedures: strength, endurance, ROM, flexibillity minutes (45035) 40   PTRx Ther Proc 1 " adjusted AFO straps to proper placement.  pt states they came off and he wasn't sure how they were supposed to go.  Demo on proper position of straps in case it happens again.  education on benefits of consistent cardio ex/activity like elliptical or doing ex videos with wife.  Education in options of how to incorporate them into daily routine, starting a few times per week at the same time each day, mixing up activity to have a variety of things he enjoys.  How staying active can keep him independent and slow rate of functional decline over time.  sit to stand practice with emphasis on control rising and lowering, keeping feet firmly on the floor and trying to reduce stepping reactions upon rising.   5 x STS testing - see obj measures.  static standing, romberg, timed gait - see objective measures and goals.   Skilled Intervention cues, demo, education.   Patient Response/Progress AFOs seem to be working well, helping stability.  improved stability for longer durations in standing.   Plan   Home program incorporate elliptical or ex videos into daily routine.   Updates to plan of care follow up in 3 weeks.   Plan for next session how are AFOs going?  assess balance/goals with AFOs.   Total Session Time   Timed Code Treatment Minutes 40   Total Treatment Time (sum of timed and untimed services) 40         [facility]                                                                                   OUTPATIENT PHYSICAL THERAPY    PLAN OF TREATMENT FOR OUTPATIENT REHABILITATION   Patient's Last Name, First Name, Steve Hernandez YOB: 1960   Provider's Name   [facility]   Medical Record No.  [id number]     Onset Date: 03/10/23  Start of Care Date: 03/30/23     Medical Diagnosis:  Deficits in ADLs; neuropathy      PT Treatment Diagnosis:  impaired gait and balance with risk for falls. Plan of Treatment  Frequency/Duration: 1x/week,  decreasing frequency as clinically appropriate/ 8 weeks    Certification date from 06/29/23 to 08/24/23         See note for plan of treatment details and functional goals     Kinjal Nunn, PT                         I CERTIFY THE NEED FOR THESE SERVICES FURNISHED UNDER        THIS PLAN OF TREATMENT AND WHILE UNDER MY CARE     (Physician attestation of this document indicates review and certification of the therapy plan).                Referring Provider:  Lisa Pierre      Initial Assessment  See Epic Evaluation- Start of Care Date: 03/30/23            PLAN  Continue therapy per current plan of care.  Continue goals 1, 4 and 5 until 8/24/2023.     Beginning/End Dates of Progress Note Reporting Period:  06/28/23 to 06/28/2023    Referring Provider:  Lisa Pierre

## 2023-09-12 NOTE — PROGRESS NOTES
"Physical Therapy Discharge Note       08/16/23 0500   Appointment Info   Signing clinician's name / credentials Kinjal Nunn, PT   Total/Authorized Visits 14   Visits Used 14   Medical Diagnosis Deficits in ADLs; neuropathy   PT Tx Diagnosis impaired gait and balance with risk for falls.   Precautions/Limitations falls   Progress Note/Certification   Start of Care Date 03/30/23   Onset of illness/injury or Date of Surgery 03/10/23   Therapy Frequency 1x/week   Predicted Duration 90 days   Certification date from 06/29/23   Certification date to 08/24/23   Progress Note Due Date 08/24/23   PT Goal 1   Goal Identifier HEP   Goal Description Caio will be independent in a home ex and activity program  to address his impairments and augment functional progress.   Goal Progress 8/16/2023:  Doing better with the exercises but \"not perfect\".  6/28/23:  \"I haven't been good about this\"   5/23/23:  inconsistent.  Hasn't done elliptical in a few weeks, sporadic with other HEP.  at [facility], 12/12/22: reports poor compliance with exercise in the past. Access to: His spouse uses DVD exercise videos - he has some interest in doing this. Has never had a gym membership. Has access to an eliptical at home - has some interest in this. A larger family room and stairs for walking exercise. May have access to handwts (2-5 lbs).   Target Date 08/24/23   Date Met 08/16/23   PT Goal 2   Goal Identifier standing balance   Goal Description Caio will be able to stand statically without UE support x 60 sec to demonstrate improved static standing for ADLS.   Goal Progress 6/28/23:  see objective measures    5/23/23: see objective measures.   Target Date 06/28/23   Date Met 06/28/23   PT Goal 3   Goal Identifier Gait   Goal Description Caio will be able to ambulate distances of 30 feet with deviation less than 6 inches in either direction and without AD to demonstrate improved safety with household ambulation.   Goal Progress 6/28/23:   met " "with AFOs on.   5/23/23:  able to walk 30 feet with less than 6 inches deviation to each side, in 80 feet only 1 deviation more than 6 inches to right.   Target Date 06/28/23   Date Met 06/28/23   PT Goal 4   Goal Identifier TRANSFERS/5x sit <> stand:   Goal Description Pt to complete 5x sit <>  </= 30 sec without UE support and maintain stability to demonstrate improved LE strength and stability with transfers with progress towards dec risk for falls.   Goal Progress 8/2/23:  see objective measures.  6/28/2023:  partially met,  but uses hands on knees 2-3 times.   5/23/23:  23.6 sec.   used on hand on thigh 3/5 reps.   5/12/23 - 26.1 sec from 18\" chair, no hands.  Several steps to catch balance with each  rep.   Target Date 08/24/23   Date Met 08/02/23   PT Goal 5   Goal Identifier GAIT SPEED:   Goal Description Pt to demonstrate significantly improved comfortable gait speed with SPC to >/= 1.0 m/s towards dec risk for falls.   Goal Progress 6/28/2023:  almost met - 0.94 m/s.  with AFOs and no AD  5/23/23:  amb 25 feet in 8.8 sec no AD.   Target Date 08/24/23   Date Met   (nearly met)   Subjective Report   Subjective Report Did better with the exercises, just didn't start bike up again.  Tracking grid helped with HEP compliance.  Happy with how AFOs are helping him balance.   Objective Measure 1   Objective Measure 6MWT   Details 1013 ft using 4WW   Objective Measure 2   Objective Measure [place] (8/2)   Details 38/56 with AFOs on   Objective Measure 3   Objective Measure romberg   Details with AFOs -1:45 min (at end of session when already tired).  Best time 2:06 min.   Objective Measure 4   Objective Measure 5 x STS (8/2)   Details with AFOs , 2 trials - 23.5 sec but with empahsis on controlled motion with slowed him down.  2nd trial 19.1 sec with good control.   Objective Measure 5   Objective Measure 25 foot walk (8/2)   Details 8.6 sec with AFOs and no cane,  8.3 sec with cane and AFOs   Objective Measure " 6   Objective Measure static standing   Details 2:05 min with AFOs   Therapeutic Procedure/Exercise   Therapeutic Procedures: strength, endurance, ROM, flexibillity minutes (96684) 38   PTRx Ther Proc 1 sit<>Stand practice with empahsis on stabilizing balance upon rising, and contolled descent,  keeping trunk fwd until pelvis on the surface,  2 x 10,  1 x 5.  enforced for home practice.  enforced home program,  working in bike starting 2 days per week, then 3 x per week x etc, increasing each week until he is doing M-F.  static standing Romberg and feet apart with AFOs. enforced education on practice at home both with / without AFOs on.   Skilled Intervention education, problem solving through barriers to exercising regularly   Patient Response/Progress verbalized understanding.   Education   Learner/Method Patient;Listening;Reading;Pictures/Video   Education Comments HEP   Plan   Updates to plan of care D/C PT   Total Session Time   Timed Code Treatment Minutes 38   Total Treatment Time (sum of timed and untimed services) 38   Medicare Claim Information   Medical Diagnosis Deficits in ADLs, self care deficit for toileting, contracture of hand.         DISCHARGE  Reason for Discharge: Patient has met all goals satisfactorily and is tolerating AFOs well.  Pt demos improved gait and balance with AFOs and is recommended to wear them for all community mobility.     Equipment Issued: AFOs per Orthotist    Discharge Plan: Patient to continue home program.    Referring Provider:  Lisa Pierre

## 2023-09-18 NOTE — PATIENT INSTRUCTIONS
DR. AKBAR'S CLINIC LOCATIONS:   MONDAY AM - [place] TUESDAY - [place]   [address] [address] [place] [address] [address] [place] [address]   [phone] / [phone] / [phone]       WEDNESDAY - [place] FRIDAY AM - [facility]   [address]   [place] [address] [place] [address] [address]   [phone] / [phone]       FRIDAY PM - [place] SCHEDULE SURGERY: [phone]   [address] BILLING QUESTIONS: [phone]   [place] [address] [address] AFTER HOURS: [phone]   [phone] / [phone] APPOINTMENTS: [phone]         Body Mass Index (BMI)  Many things can cause foot and ankle problems. Foot structure, activity level, foot mechanics and injuries are common causes of pain.  One very important issue that often goes unmentioned, is body weight.  Extra weight can cause increased stress on muscles, ligaments, bones and tendons.  Sometimes just a few extra pounds is all it takes to put one over her/his threshold. Without reducing that stress, it can be difficult to alleviate pain. Some people are uncomfortable addressing this issue, but we feel it is important for you to think about it. As Foot &  Ankle specialists, our job is addressing the lower extremity problem and possible causes. Regarding extra body weight, we encourage patients to discuss diet and weight management plans with their primary care doctors. It is this team approach that gives you the best opportunity for pain relief and getting you back on your feet.        CALLUS / CORNS / IPKs  When there is excessive friction or pressure on the skin, the body responds by making the skin thicker to protect the deeper structures from becoming exposed. While this works well to protect the deeper structures, the thickened skin can increase pressure and pain.    CALLUS: Flat, diffuse thickening are simple calluses and they are usually caused by friction.  Often these are the result of rubbing on a shoe or going barefoot.    CORNS: Calluses with a central core between the toes are called corns. These result from prominent joints on adjacent toes rubbing together. Theses are a symptom of bone malalignment and will always recur unless the underlying bones are addressed surgically.    IPKs: Calluses with a central core on the ball of the foot are usually IPKs (intractable plantar keratosis). These are caused by excessive pressure from the metatarsals, the bones that make up the ball of the foot. Often one of these bones is too long or too prominent.  Again, these will always recur unless the underlying bone issue is addressed. There is no cure for these. They will either go away by themselves, recur, or more could develop.    ROUTINE MAINTENANCE  1. File them down with a pumice stone or callus file a couple times a week.   2. An electric callus removing device. Amope Pedi Perfect Electronic Pedicure Foot File and Callus Remover can be a good option.   3. Lotion can be applied to soften the callus. A urea based cream such as Kersal or Vanicream or thicker cream with shea butter are good options.  4. Toe spacers or toe covers can be used for corns, gel pads can be used for other lesions on the bottom of the foot.   If there is a surgical pathology noted, such as a prominent bone, often this needs to be addressed surgically to minimize recurrence. However, sometimes the lesion simply migrates to another spot after surgery, so it is not a guaranteed cure.         NAIL FUNGUS / ONYCHOMYCOSIS   Nail fungus is not a hygiene problem and will not likely lead to significant medical   problems. The nails may get thick causing pain and possibly local skin infection.   Treatments include debridement (trimming), oral antifungals, topical antifungals and complete removal of the nail. Most fungal nails are not treated.   Topicals such as tea tree oil can be helpful for surface fungus  and may, at best, limit   progression. Over the counter creams (such as Lamisil) can also be used however, their effectiveness is also quite low.  Topical treatment with Pen lac is expensive and often not covered by insurance. Pen lac has an approximate 8% success rate. Topical therapy recommendations is to apply twice a day for at least 3-4 months as it takes 9 months for new nail to grow out.    Experts suggest soaking your feet for 15 to 20 minutes in a mixture of 1 cup vinegar to 4 cups warm water. Be sure to rinse well and pat your feet dry when you're done. You can soak your feet like this daily. But if your skin becomes irritated, try soaking only two to three times a week. Vicks VapoRub, as with vinegar, there have been no controlled clinical trials to assess the effectiveness of Vicks VapoRub on nail fungus, but there have been numerous anecdotal reports that it works. There's no consensus on how often to apply this product, so check with your doctor before using it on your nails.     Oral therapies include Sporanox and Lamisil. Oral therapies are also expensive and not very effective. Side effects such as liver disease are the main concern. Return of fungus is common even if the treatment worked.     Other Tips:  - Penlac nail medication apply daily x 4 months; remove old polish first day of each week  - Antifungal cream/powder (Zeasorb) - apply daily to feet and shoes x 2 months  - Clean shoes with Lysol or in washing machine every few weeks  - Rotate shoe gear; give them 24 hours to dry out between days wearing them  - Clean pair of socks in morning, clean pair in afternoon if your feet sweat  - Shower shoes used in public showers/pools  [facility] ORTHOT[facility] LOCATIONS  [facility]  [address]  [place] [address]  Phone: [phone]  Fax: [phone] [facility]  [address]  [place] [address]  Phone:  [phone]   Fax: [phone]   [facility]  [address]  [place] [address]  Phone: [phone]  Fax: [phone] [facility]  [address]  [place] [address]  Phone: [phone]   Fax: [phone]   [facility]   [address]  [place] [address]  Phone: [phone]   Fax: [phone] * Please call any location listed to make an appointment for a casting/fitting. Your referral was sent to their central office and they will all have the order on file.     WEARING YOUR CUSTOM FOOT ORTHOTICS   Most insurance plans cover one pair of orthotics per year. You must check with your   insurance plan to see what your payment responsibility will be. Please call your   insurance company by calling the number on the back of your insurance card.   Orthotic's are non-refundable and non-returnable.   Orthotics are made of various designs. Some orthotics are covered with material that extends beyond your toes. If your orthotic is of this design, you will likely need to trim the toe end to get a proper fit. The insole from your shoe can be used as a template. Simply overlay the shoe insert on top of the custom orthotic. Align the heel end while tracing the length of the insert onto the custom orthotic. Use a large scissor to trim the toe end until you get a proper fit in the shoe.   The orthotic needs to be pushed as far back in the shoe as possible. The heel portion should not ride forward so as not to irritate your heel.   Orthotics are designed to work with socks. Excessive perspiration will shorten the life span of the orthotics. Remove the orthotic from the shoe frequently for proper drying.   The break-in period lasts for weeks. People new to orthotics will likely experience new aches and pains. The orthotic is forcing your foot into a new position. Arch, foot and leg muscle aches and fatigue are common during these weeks.  Minor discomfort can be considered normal break in phenomenon. Start wearing your orthotic around your home your first day. Limited activity for one to two hours is recommended. You can increase one or two additional hours each day provided the aches and pains are subsiding. The degree of discomfort, fatigue and problems will dictate the speed of break in. You may require multiple weeks to work up to full time use.   Do not continue wearing your orthotics if they are creating problems such as blisters or sores. Do not hesitate to call the clinic to speak with a nurse regarding orthotic   break in, fit, trimming, etc. You may also need to see the doctor if the orthotics are   simply not working out. Adjustments are sometimes made to improve orthotic   function.     Orthotics will only work in certain styles and types of shoes. Orthotics rarely work in dress shoes. Slip-ons, clogs, sandals and heels are particularly troublesome. Specially designed orthotics may be necessary for these types of shoes. Your custom orthotic was designed for activities that require appropriate walking or running shoes. Lace up athletic shoes, walking shoes or work boots should work appropriately. You may need a wider or longer shoe. Shoes with a removable  or insert work best. In general, you want to remove an insert from the shoe before placing the orthotic into the shoe. Shoes without a removable liner may not work as well.     When purchasing new shoes, bring your orthotics along to get a proper fit. Shop at stores that are familiar with orthotics.   Frequent washing of the orthotic may shorten the life span of the top cover. The top cover can be replaced but will generally last one to five years depending on use and foot perspiration.     FOOT CARE NURSES  If you are interested in having a foot care nurse come out to your   home, please call one of these contacts for more information:  [facility]  [phone] [facility]  [phone]   [facility]  [phone]  [facility] [phone]  [facility]  [phone]  At [facility] [phone]          Detail Level: Detailed Depth Of Biopsy: dermis Was A Bandage Applied: Yes Size Of Lesion In Cm: 0 Biopsy Type: H and E Biopsy Method: Personna blade Anesthesia Type: 1% lidocaine with epinephrine Anesthesia Volume In Cc: 0.5 Hemostasis: Drysol Wound Care: Vaseline Dressing: bandage Destruction After The Procedure: No Type Of Destruction Used: Curettage Curettage Text: The wound bed was treated with curettage after the biopsy was performed. Cryotherapy Text: The wound bed was treated with cryotherapy after the biopsy was performed. Electrodesiccation Text: The wound bed was treated with electrodesiccation after the biopsy was performed. Electrodesiccation And Curettage Text: The wound bed was treated with electrodesiccation and curettage after the biopsy was performed. Silver Nitrate Text: The wound bed was treated with silver nitrate after the biopsy was performed. Lab: [id number] Lab Facility:  Consent: Written consent was obtained and risks were reviewed including but not limited to scarring, infection, bleeding, scabbing, incomplete removal, nerve damage and allergy to anesthesia. Post-Care Instructions: I reviewed with the patient in detail post-care instructions. Patient is to keep the biopsy site dry overnight, and then apply bacitracin twice daily until healed. Patient may apply hydrogen peroxide soaks to remove any crusting. Notification Instructions: Patient will be notified of biopsy results. However, patient instructed to call the office if not contacted within 2 weeks. Billing Type: Third-Party Bill Information: Selecting Yes will display possible errors in your note based on the variables you have selected. This validation is only offered as a suggestion for you. PLEASE NOTE THAT THE VALIDATION TEXT WILL BE REMOVED WHEN YOU FINALIZE YOUR NOTE. IF YOU WANT TO FAX A PRELIMINARY NOTE YOU WILL NEED TO TOGGLE THIS TO 'NO' IF YOU DO NOT WANT IT IN YOUR FAXED NOTE.

## 2023-09-29 ENCOUNTER — PATIENT OUTREACH (OUTPATIENT)
Dept: FAMILY MEDICINE | Facility: CLINIC | Age: 63
End: 2023-09-29
Payer: MEDICARE

## 2023-09-29 NOTE — TELEPHONE ENCOUNTER
- Patient calls and LM on PAL RN line. States that he is interested in getting his Covid Booster, would like to schedule if available.    - States that he believes he got his RSV immunization, but has not been given at [facility]. States that he wants to know what immunization he received at last physical.     - Call patient, advised would need to schedule same day as the supply for the vaccine is first come first serve, so not able to book out. Informed PAL RN that he cannot do today but will look at next week. Advised patient to call back on the day that he would like to have it scheduled to verify supply and then schedule.    - Patient would like to schedule both the influenza and covid vaccine in same visit, advised to inform  when calling back this next week.     - Patient advised will call back Monday or Tuesday to schedule. Closing encounter for now.    Quinn Foley, RN  Patient Advocate Liaison (PAL)  [facility]  [phone]    09/29/2023 at 1:14 PM

## 2023-10-02 ENCOUNTER — TELEPHONE (OUTPATIENT)
Dept: FAMILY MEDICINE | Facility: CLINIC | Age: 63
End: 2023-10-02
Payer: MEDICARE

## 2023-10-02 NOTE — TELEPHONE ENCOUNTER
Reason for Call:  Appointment Request    Patient requesting this type of appt:  flu and covid 19 vaccine     Requested provider: MA/VF/LPN Visit    Reason patient unable to be scheduled: Not within requested timeframe    When does patient want to be seen/preferred time: 1-2 weeks    Comments: flu and covid 19 vaccine     Could we send this information to you in [email or web address] or would you prefer to receive a phone call?:   Patient would prefer a phone call   Okay to leave a detailed message?: Yes at Cell number on file:    Telephone Information:   Mobile [phone]       Call taken on 10/2/2023 at 2:39 PM by Gin Coleman

## 2023-10-02 NOTE — TELEPHONE ENCOUNTER
Spoke with pt and scheduled for visit. Pt aware we may not have covid vaccine at visit but still will come in for flu shot.

## 2023-10-04 ENCOUNTER — ALLIED HEALTH/NURSE VISIT (OUTPATIENT)
Dept: FAMILY MEDICINE | Facility: CLINIC | Age: 63
End: 2023-10-04
Payer: MEDICARE

## 2023-10-04 DIAGNOSIS — Z23 NEEDS FLU SHOT: ICD-10-CM

## 2023-10-04 DIAGNOSIS — Z23 NEED FOR COVID-19 VACCINE: Primary | ICD-10-CM

## 2023-10-04 PROCEDURE — 91320 SARSCV2 VAC 30MCG TRS-SUC IM: CPT

## 2023-10-04 PROCEDURE — 99207 PR NO CHARGE NURSE ONLY: CPT

## 2023-10-04 PROCEDURE — G0008 ADMIN INFLUENZA VIRUS VAC: HCPCS | Mod: 59

## 2023-10-04 PROCEDURE — 90480 ADMN SARSCOV2 VAC 1/ONLY CMP: CPT

## 2023-10-04 PROCEDURE — 90662 IIV NO PRSV INCREASED AG IM: CPT

## 2023-10-07 DIAGNOSIS — K21.9 GASTROESOPHAGEAL REFLUX DISEASE WITHOUT ESOPHAGITIS: Primary | ICD-10-CM

## 2023-10-07 DIAGNOSIS — R39.11 BENIGN PROSTATIC HYPERPLASIA WITH URINARY HESITANCY: ICD-10-CM

## 2023-10-07 DIAGNOSIS — N40.1 BENIGN PROSTATIC HYPERPLASIA WITH URINARY HESITANCY: ICD-10-CM

## 2023-10-10 RX ORDER — FINASTERIDE 5 MG/1
1 TABLET, FILM COATED ORAL DAILY
Qty: 90 TABLET | Refills: 1 | Status: SHIPPED | OUTPATIENT
Start: 2023-10-10 | End: 2024-04-22

## 2023-10-10 RX ORDER — FAMOTIDINE 40 MG/1
40 TABLET, FILM COATED ORAL DAILY
Qty: 90 TABLET | Refills: 1 | Status: SHIPPED | OUTPATIENT
Start: 2023-10-10 | End: 2024-04-19

## 2023-10-18 ENCOUNTER — PATIENT OUTREACH (OUTPATIENT)
Dept: GASTROENTEROLOGY | Facility: CLINIC | Age: 63
End: 2023-10-18
Payer: MEDICARE

## 2023-10-24 DIAGNOSIS — I10 ESSENTIAL HYPERTENSION WITH GOAL BLOOD PRESSURE LESS THAN 140/90: ICD-10-CM

## 2023-10-24 DIAGNOSIS — R73.9 HYPERGLYCEMIA: ICD-10-CM

## 2023-10-24 DIAGNOSIS — E11.40 TYPE 2 DIABETES MELLITUS WITH DIABETIC NEUROPATHY, WITHOUT LONG-TERM CURRENT USE OF INSULIN (H): ICD-10-CM

## 2023-10-24 RX ORDER — FLURBIPROFEN SODIUM 0.3 MG/ML
SOLUTION/ DROPS OPHTHALMIC
Qty: 400 EACH | Refills: 0 | Status: SHIPPED | OUTPATIENT
Start: 2023-10-24 | End: 2023-11-17

## 2023-10-24 RX ORDER — ATENOLOL 25 MG/1
25 TABLET ORAL DAILY
Qty: 90 TABLET | Refills: 1 | Status: SHIPPED | OUTPATIENT
Start: 2023-10-24 | End: 2024-03-11

## 2023-10-24 NOTE — TELEPHONE ENCOUNTER
Pt calls, requests refill on pen needles and atenolol.    T'd up meds and pharmacy and routing refill request to refill pool to be processed in order.    Amee GARCIA RN

## 2023-11-14 ENCOUNTER — TELEPHONE (OUTPATIENT)
Dept: FAMILY MEDICINE | Facility: CLINIC | Age: 63
End: 2023-11-14
Payer: MEDICARE

## 2023-11-14 NOTE — TELEPHONE ENCOUNTER
Patient calling to see if RX for pen needles was sent.  RX was sent 10/24/23.  Advised to follow-up with his pharmacy and speak to a pharmacy team member.  Patient agrees with plan.  Daisy Bradley RN

## 2023-11-16 ENCOUNTER — VIRTUAL VISIT (OUTPATIENT)
Dept: PSYCHIATRY | Facility: CLINIC | Age: 63
End: 2023-11-16
Payer: MEDICARE

## 2023-11-16 ENCOUNTER — VIRTUAL VISIT (OUTPATIENT)
Dept: BEHAVIORAL HEALTH | Facility: CLINIC | Age: 63
End: 2023-11-16
Payer: MEDICARE

## 2023-11-16 DIAGNOSIS — F33.0 MILD EPISODE OF RECURRENT MAJOR DEPRESSIVE DISORDER (H): Primary | ICD-10-CM

## 2023-11-16 DIAGNOSIS — F33.1 MAJOR DEPRESSIVE DISORDER, RECURRENT EPISODE, MODERATE (H): Primary | ICD-10-CM

## 2023-11-16 PROCEDURE — 90832 PSYTX W PT 30 MINUTES: CPT | Mod: 95 | Performed by: COUNSELOR

## 2023-11-16 PROCEDURE — 99213 OFFICE O/P EST LOW 20 MIN: CPT | Mod: VID | Performed by: PSYCHIATRY & NEUROLOGY

## 2023-11-16 RX ORDER — BUPROPION HYDROCHLORIDE 150 MG/1
150 TABLET ORAL EVERY MORNING
Qty: 90 TABLET | Refills: 1 | Status: SHIPPED | OUTPATIENT
Start: 2023-11-16 | End: 2024-04-10

## 2023-11-16 RX ORDER — MIRTAZAPINE 45 MG/1
45 TABLET, FILM COATED ORAL AT BEDTIME
Qty: 30 TABLET | Refills: 1 | Status: SHIPPED | OUTPATIENT
Start: 2023-11-16 | End: 2024-01-11

## 2023-11-16 ASSESSMENT — PATIENT HEALTH QUESTIONNAIRE - PHQ9
10. IF YOU CHECKED OFF ANY PROBLEMS, HOW DIFFICULT HAVE THESE PROBLEMS MADE IT FOR YOU TO DO YOUR WORK, TAKE CARE OF THINGS AT HOME, OR GET ALONG WITH OTHER PEOPLE: SOMEWHAT DIFFICULT
SUM OF ALL RESPONSES TO PHQ QUESTIONS 1-9: 8
SUM OF ALL RESPONSES TO PHQ QUESTIONS 1-9: 8

## 2023-11-16 NOTE — NURSING NOTE
Is the patient currently in the state of [place]? YES    Visit mode:VIDEO    If the visit is dropped, the patient can be reconnected by: VIDEO VISIT: Text to cell phone:   Telephone Information:   Mobile [phone]       Will anyone else be joining the visit? NO  (If patient encounters technical issues they should call [phone]    How would you like to obtain your AVS? MyChart    Are changes needed to the allergy or medication list? Pt stated no changes to allergies and Pt stated no med changes    Reason for visit: BHUMI GORDILLO

## 2023-11-16 NOTE — PROGRESS NOTES
[facility]  November 16, 2023      Behavioral Health Clinician Progress Note    Patient Name: Steve Morgan           Service Type:  Individual      Service [facility]   [facility] Email (patient reached)     Session Start Time: 1025am  Session End Time: 1102am      Session Length: 16 - 37      Attendees: Patient     Service Modality:  Video Visit:      Provider verified identity through the following two step process.  Patient provided:  Patient photo and Patient was verified at admission/transfer    Telemedicine Visit: The patient's condition can be safely assessed and treated via synchronous audio and visual telemedicine encounter.      Reason for Telemedicine Visit: Services only offered telehealth    Originating Site (Patient Location): Patient's home    Distant Site (Provider Location): Provider Remote Setting- Home Office    Consent:  The patient/guardian has verbally consented to: the potential risks and benefits of telemedicine (video visit) versus in person care; bill my insurance or make self-payment for services provided; and responsibility for payment of non-covered services.     Patient would like the video invitation sent by:  My Chart    Mode of Communication:  Video Conference via [facility]    Distant Location (Provider):  Off-site    As the provider I attest to compliance with applicable laws and regulations related to telemedicine.    Visit Activities (Refresh list every visit): [facility] Only    Diagnostic Assessment Date: 12/7/2022 Veronica Chavis;  5/25/2023 Anant Pinto M.D.   Treatment Plan Review Date: in the next few visits   See Flowsheets for today's PHQ-9 and JOSE MANUEL-7 results  Previous PHQ-9:       8/17/2023     7:46 AM 8/23/2023     2:13 PM 11/16/2023    10:08 AM   PHQ-9 SCORE   PHQ-9 Total Score [facility] 14 (Moderate depression) 10 (Moderate depression) 8 (Mild depression)   PHQ-9 Total Score 14 10 8     Previous JOSE MANUEL-7:       1/27/2023     10:03 AM 3/10/2023     1:48 PM 6/30/2023     8:21 AM   JOSE MANUEL-7 SCORE   Total Score 2 (minimal anxiety) 5 (mild anxiety) 3 (minimal anxiety)   Total Score 2 5 3   See JOSE MANUEL 2 below for current scores.     SHANNON LEVEL:      1/15/2020     8:18 PM 12/16/2022     2:26 PM   SHANNON Score (Last Two)   SHANNON Raw Score 36 34   Activation Score 75.5 68.9   SHANNON Level 4 3       DATA  Extended Session (60+ minutes): No  Interactive Complexity: No  Crisis: No  [facility] Patient: No    Treatment Objective(s) Addressed in This Session:  Target Behavior(s):  improve stress and anger/irritability    Depressed Mood: Decrease frequency and intensity of feeling down, depressed, hopeless  Improve quantity and quality of night time sleep / decrease daytime naps  Feel less tired and more energy during the day   Anger Management: will learn and practice positive anger management skills     Current Stressors / Issues:  Medication Questions/Requests: get with a therapist to see if they are expecting too much from the mirtazapine, are hesitant to increase the dose      update: Pt will have days where things are good and the next day where they are very irritable. He is wondering if they are expecting too much from their medication. He hasn't noticed a pattern of these days. If it catches them of guard or if his wife will get after him that it will go from there. His mood can be from 0 to 60. It has been occurring for many years.He reports that the older he has gotten the less control of his emotions he is in. Pt has tried to be more in the moment which is hard. Pt denies any pain.   Stressors: daughter got  a couple weeks ago    Sleep: will take medication to help with sleep and taking it consistently around 930-10pm- he just got back from vacation and his wife noticed that he was snoring more, plans to reach out to sleep provider, he is able to to fall asleep easily, a few days he will take his wife early to the bus stop and he feels pretty good,  he doesn't feel like he did before when he would wake up from snoring since using their sleep ap machine     Suicidality: in the hospital were working to get kidney's under control and pt was taken off 2 medications and had passive ideation and pt restarted the bupropion and it got better  Self-harm: pt denies   Substance Use: pt denies   Caffeine: use to drink a lot of diet Coke and don't drink any carbonation now, a little coffee once and awhile and not much   Therapist: needing to get with someone else, [place], pt is wanting long term- wanting help to control temper, groups for anger management, online or in person; pt would like to do couples therapy but their wife is still working     [facility] will send a [employer] message to schedule for therapy if they are not able to be seen for awhile.        Progress on Treatment Objective(s) / Homework:  Minimal progress - ACTION (Actively working towards change); Intervened by reinforcing change plan / affirming steps taken    CBT: Pt reports that they are sleeping well. He says that he has worked with his wife to set boundaries to not ask him questions and allow him time to wake up since she usually gets up before him. He has tired to be more in the moment and stay present.     [facility] recommends using the suleman called [place] [employer] for anger developed by [facility] where you can limit anonymous data being sent to them about how you use the suleman to improve it and it has a lot of skills you can favorite from deep breathing to relaxing sounds. [facility] recommends pt to exercise and do calming and relaxing things more often so that when they are irritable they can use skills to be more calm. [facility] encourages pt to reach out to the clinic to see if they can see a different therapist and them to let [facility] know if they are scheduled a ways out and [facility] or another C at the clinic can see them in the interim.     Motivational Interviewing    MI Intervention: Co-Developed Goal: reduce anger and  irritability, Expressed Empathy/Understanding, Supported Autonomy, Collaboration, Evocation, Permission to raise concern or advise, Open-ended questions, Reflections: simple and complex, Change talk (evoked), and Reframe     Change Talk Expressed by the Patient: Committment to change Activation Taking steps    Provider Response to Change Talk: E - Evoked more info from patient about behavior change, A - Affirmed patient's thoughts, decisions, or attempts at behavior change, R - Reflected patient's change talk, and S - Summarized patient's change talk statements    Also provided psychoeducation about behavioral health condition, symptoms, and treatment options    Assessments completed prior to visit:  The following assessments were completed by patient for this visit:  PHQ9:       5/17/2023     7:48 AM 5/25/2023     1:19 PM 6/8/2023    12:46 PM 6/30/2023     8:20 AM 8/17/2023     7:46 AM 8/23/2023     2:13 PM 11/16/2023    10:08 AM   PHQ-9 SCORE   PHQ-9 Total Score MyChart 14 (Moderate depression) 16 (Moderately severe depression) 18 (Moderately severe depression) 7 (Mild depression) 14 (Moderate depression) 10 (Moderate depression) 8 (Mild depression)   PHQ-9 Total Score 14 16 18 7 14 10 8     GAD2:       12/1/2022    11:36 AM 5/15/2023    10:41 AM 5/21/2023     4:33 PM 8/23/2023     2:14 PM 11/16/2023    10:08 AM   JOSE MANUEL-2   Feeling nervous, anxious, or on edge 1 1 0 1 0   Not being able to stop or control worrying 1 0 0 0 0   JOSE MANUEL-2 Total Score 2 1 0 1 0       PROMIS 10-Global Health (only subscores and total score):       10/27/2022    12:43 PM 5/15/2023    10:44 AM 5/21/2023     4:35 PM 8/23/2023     2:16 PM 11/16/2023    10:10 AM   PROMIS-10 Scores Only   Global Mental Health Score 12 9 10 11 9   Global Physical Health Score 12 12 13 15 12   PROMIS TOTAL - SUBSCORES 24 21 23 26 21       Care Plan review completed: No    Medication Review:  No changes to current psychiatric medication(s)    Medication  Compliance:  Yes, take medication consitnely to go to bed    Changes in Health Issues:   None reported    Chemical Use Review:   Substance Use: Chemical use reviewed, no active concerns identified      Tobacco Use: No current tobacco use.      Assessment: Current Emotional / Mental Status (status of significant symptoms):  Risk status (Self / Other harm or suicidal ideation)  Patient denies a history of suicidal ideation, suicide attempts, self-injurious behavior, homicidal ideation, homicidal behavior, and and other safety concerns   Patient denies current fears or concerns for personal safety.  Patient denies current or recent suicidal ideation or behaviors.  Patient denies current or recent homicidal ideation or behaviors.  Patient denies current or recent self injurious behavior or ideation.  Patient denies other safety concerns.  A safety and risk management plan has not been developed at this time, however patient was encouraged to call [place] [phone] should there be a change in any of these risk factors.    Appearance:   Appropriate   Eye Contact:   Good   Psychomotor Behavior: Normal   Attitude:   Cooperative  Interested Friendly  Orientation:   All  Speech   Rate / Production: Normal    Volume:  Normal   Mood:    Irritable  Sad   Affect:    Appropriate   Thought Content:  Clear   Thought Form:  Coherent  Logical   Insight:    Good     Diagnoses:  1. Mild episode of recurrent major depressive disorder (H24)    \    Collateral Reports Completed:  Communicated with: Anant Pinto M.D.     Plan: (Homework, other):  Patient was given information about behavioral services and encouraged to schedule a follow up appointment with the clinic [facility] in 1 month.  He was also given information about mental health symptoms and treatment options .  CD Recommendations: No indications of CD issues. [facility] will send pt a MyChart message so they can respond to this if they are needing therapy earlier than the wait for a long  term therapist.     SAURABH Andujar    ______________________________________________________________________    Integrated Primary Care Behavioral Health Treatment Plan    Patient's Name: Steve Morgan  YOB: 1960    Date of Creation: in the next few visits  Date Treatment Plan Last Reviewed/Revised: in the next few visits    DSM5 Diagnoses:   1. Mild episode of recurrent major depressive disorder (H24)      Psychosocial / Contextual Factors: has wife, irritability/anger  PROMIS (reviewed every 90 days):   PROMIS 10-Global Health (only subscores and total score):       10/27/2022    12:43 PM 5/15/2023    10:44 AM 5/21/2023     4:35 PM 8/23/2023     2:16 PM 11/16/2023    10:10 AM   PROMIS-10 Scores Only   Global Mental Health Score 12 9 10 11 9   Global Physical Health Score 12 12 13 15 12   PROMIS TOTAL - SUBSCORES 24 21 23 26 21       Referral / Collaboration:  Referral to another professional/service is not indicated at this time.    Anticipated number of session for this episode of care: 5-6  Anticipation frequency of session: Monthly  Anticipated Duration of each session: 16-37 minutes  Treatment plan will be reviewed in 90 days or when goals have been changed.       Patient has reviewed and agreed to the above plan.      SAURABH Andujar  November 16, 2023

## 2023-11-16 NOTE — PROGRESS NOTES
Virtual Visit Details    Type of service:  Video Visit   Video Start Time: 11:19 AM  Video End Time:11:36 AM    Originating Location (pt. Location): Home    Distant Location (provider location):  Off-site  Platform used for Video Visit: [facility] Psychiatry Consult Note    IDENTIFICATION   Name: Steve Morgan   : 1960/63 year old      Sex:    @ male          Telemedicine Visit: The patient's condition can be safely assessed and treated via synchronous audio and visual telemedicine encounter.        Face to Face/patient Contact total time: 17 minutes  Pre Charting time: 2 minutes; Post charting time, communication and other activities: 0 minutes; Total time 19 minutes            SUBJECTIVE   Desiring increased dose or adjusting expectations vs therapy. Him and sister tolerate each other; gets irritable (without saying things to her). Feels like he wants to react but tries not to react. However he and wife go at each other; he feels most of it is from him flying off the handle. Reports depression 6-7/10 . No side effects of mirtazapine.    He has not actually been taking wellbutrin. Reports last dose may have been April or May. Recalls partial benefit with wellbutrin.       The following assessments were completed by patient for this visit:  PHQ9:       2023     7:48 AM 2023     1:19 PM 2023    12:46 PM 2023     8:20 AM 2023     7:46 AM 2023     2:13 PM 2023    10:08 AM   PHQ-9 SCORE   PHQ-9 Total Score MyChart 14 (Moderate depression) 16 (Moderately severe depression) 18 (Moderately severe depression) 7 (Mild depression) 14 (Moderate depression) 10 (Moderate depression) 8 (Mild depression)   PHQ-9 Total Score 14 16 18 7 14 10 8     GAD7:       2021     7:01 AM 2021    10:11 AM 10/17/2022    10:38 AM 2022     9:43 AM 2023    10:03 AM 3/10/2023     1:48 PM 2023     8:21 AM   JOSE MANUEL-7 SCORE   Total Score 4 (minimal anxiety) 3 (minimal  anxiety)  2 (minimal anxiety) 2 (minimal anxiety) 5 (mild anxiety) 3 (minimal anxiety)   Total Score 4 3 6 2 2 5 3     PROMIS 10-Global Health (only subscores and total score):       10/27/2022    12:43 PM 5/15/2023    10:44 AM 5/21/2023     4:35 PM 8/23/2023     2:16 PM 11/16/2023    10:10 AM   PROMIS-10 Scores Only   Global Mental Health Score 12 9 10 11 9   Global Physical Health Score 12 12 13 15 12   PROMIS TOTAL - SUBSCORES 24 21 23 26 21             8/17/2023     7:46 AM 8/23/2023     2:13 PM 11/16/2023    10:08 AM   PHQ   PHQ-9 Total Score 14 10 8   Q9: Thoughts of better off dead/self-harm past 2 weeks Several days Not at all Not at all   F/U: Thoughts of suicide or self-harm No     F/U: Safety concerns No            1/27/2023    10:03 AM 3/10/2023     1:48 PM 6/30/2023     8:21 AM   JOSE MANUEL-7 SCORE   Total Score 2 (minimal anxiety) 5 (mild anxiety) 3 (minimal anxiety)   Total Score 2 5 3        OBJECTIVE     Vital Signs:   There were no vitals taken for this visit.    Labs:  Recommended to get lab appointment for do labs    Current Medications:  Current Outpatient Medications   Medication    acetaminophen (TYLENOL) 325 MG tablet    ASPIRIN 81 MG OR TABS    atenolol (TENORMIN) 25 MG tablet    buPROPion (WELLBUTRIN XL) 300 MG 24 hr tablet    Calcium Carb-Cholecalciferol (CALCIUM 600 + D PO)    Continuous Blood Gluc  (FREESTYLE GIULIANA 2 READER) SIENA    Continuous Blood Gluc Sensor (FREESTYLE GIULIANA 2 SENSOR) MISC    Cyanocobalamin (VITAMIN B 12 PO)    famotidine (PEPCID) 40 MG tablet    ferrous sulfate 325 (65 FE) MG tablet    finasteride (PROSCAR) 5 MG tablet    glipiZIDE (GLUCOTROL XL) 10 MG 24 hr tablet    ibuprofen (ADVIL/MOTRIN) 200 MG tablet    insulin glargine (BASAGLAR KWIKPEN) 100 UNIT/ML pen    insulin pen needle (B-D U/F) 31G X 5 MM miscellaneous    latanoprost (XALATAN) 0.005 % ophthalmic solution    LEVOXYL 137 MCG tablet    losartan (COZAAR) 100 MG tablet    metFORMIN (GLUCOPHAGE) 1000 MG  tablet    mirtazapine (REMERON) 30 MG tablet    MULTI-VITAMIN OR TABS    mupirocin (BACTROBAN) 2 % external ointment    pantoprazole (PROTONIX) 40 MG EC tablet    simvastatin (ZOCOR) 20 MG tablet    Urea (URE-NA) 15 g PACK packet    VITAMIN D, CHOLECALCIFEROL, PO     No current facility-administered medications for this visit.        The [place] Prescription Monitoring Program has been reviewed and there are no concerns about diversionary activity for controlled substances at this time.        ADDED HISTORY   Vacation went good, able to relax. Did help overall. Daughter got , then pt went to [facility] for vacation.     MENTAL STATUS EXAMINATION:   Appearance: intact attention to grooming and hygiene  Attitude:  cooperative   Eye Contact:  good  Gait and Station: sitting  Psychomotor Behavior: Within normal limits  Oriented to:  Grossly person place and time  Attention Span and Concentration:  Grossly intact  Speech:  normal tone  Language: english  Mood: Good  Affect: Euthymic  Associations:  no loose associations  Thought Process:  logical, linear and goal oriented  Thought Content:  No evidence of delusions or suicidal or homicidal ideation plan or intent  Memory: grossly intact  Fund of Knowledge: Good  Insight:  good  Judgment:  intact, adequate for safety  Impulse Control:  intact        DIAGNOSES:   Major Depressive Disorder, moderate, recurrent  SIADH/hyponatremia      ASSESSMENT:   Patient with history of depression, and considerable mood difficulties without prior combo of citalopram/bupropion affecting relationships negatively. Has comorbid diagnoses including SIADH/hyponatermia (possibly worse with selective serotonin reuptake inhibitor) historically, DM, obstructive sleep apnea, b12 deficiency.     Per literature review mirtazapine is less likely to cause hyponatremia, and for current eGFR no dosage necessary. Will pursue.    Today Steve Morgan reports no suicidal ideations. In addition, he has  notable risk factors for self-harm, including age and comorbid medical conditions. However, risk is mitigated by commitment to family, Congregational beliefs and absence of past attempts. Therefore, based on all available evidence including the factors cited above, he does not appear to be at imminent risk for self-harm, does not meet criteria for a 72-hr hold, and therefore remains appropriate for ongoing outpatient level of care.       PLAN:     Patient advised of consultative model. Patient will continue to be seen for ongoing consultation and stabilization.  Does not meet criteria for involuntary treatment or hospitalization  Add mirtazapine 5/25/23 7.5 mg po at bedtime x 3 nights then 15 mg po at bedtime slight benefit for anger=> 7/27/23 30 mg at bedtime efficacy partially, difficulty with irritability => 11/16/23 45 gm po at bedtime -Risks, benefits and alternatives discussed.  Patient provides verbal consent to treatment.  restart bupropion 11/16/23 xl 150 mg daily-Risks, benefits and alternatives discussed.  Patient provides verbal consent to treatment. With renal disease avoid 300 mg can consider 200 mg/24 h  DC'd citalopram (hyponatremia/SIADH)  Labs - reviewed; follow-up CMP in 10 weeks  Referred for MTM referral, priority on guidance for psychotropic selection and risk of hyponatremia  Therapy with Veronica W  Return in 4-10 weeks    Administrative Billing:   Time spent with patient was greater than 50% of time and/or significant time was spent in counseling and coordination of care regarding above diagnoses and treatment plan. Pre charting time and post charting time/documentation/coordination are done on date of service.      Signed:   Anant Pinto M.D.  [facility] Psychiatry Service    Disclaimer: This note consists of symbols derived from keyboarding, dictation and/or voice recognition software. As a result, there may be errors in the script that have gone undetected. Please consider this when  interpreting information found in this chart.

## 2023-11-17 ENCOUNTER — OFFICE VISIT (OUTPATIENT)
Dept: FAMILY MEDICINE | Facility: CLINIC | Age: 63
End: 2023-11-17
Payer: MEDICARE

## 2023-11-17 ENCOUNTER — NURSE TRIAGE (OUTPATIENT)
Dept: FAMILY MEDICINE | Facility: CLINIC | Age: 63
End: 2023-11-17

## 2023-11-17 VITALS
WEIGHT: 240 LBS | TEMPERATURE: 98.6 F | HEIGHT: 65 IN | RESPIRATION RATE: 18 BRPM | OXYGEN SATURATION: 98 % | HEART RATE: 76 BPM | SYSTOLIC BLOOD PRESSURE: 137 MMHG | BODY MASS INDEX: 39.99 KG/M2 | DIASTOLIC BLOOD PRESSURE: 84 MMHG

## 2023-11-17 DIAGNOSIS — J06.9 UPPER RESPIRATORY TRACT INFECTION, UNSPECIFIED TYPE: Primary | ICD-10-CM

## 2023-11-17 DIAGNOSIS — Z79.4 TYPE 2 DIABETES MELLITUS WITH DIABETIC NEUROPATHY, WITH LONG-TERM CURRENT USE OF INSULIN (H): ICD-10-CM

## 2023-11-17 DIAGNOSIS — E11.40 TYPE 2 DIABETES MELLITUS WITH DIABETIC NEUROPATHY, WITH LONG-TERM CURRENT USE OF INSULIN (H): ICD-10-CM

## 2023-11-17 DIAGNOSIS — R73.9 HYPERGLYCEMIA: ICD-10-CM

## 2023-11-17 DIAGNOSIS — N18.30 STAGE 3 CHRONIC KIDNEY DISEASE, UNSPECIFIED WHETHER STAGE 3A OR 3B CKD (H): ICD-10-CM

## 2023-11-17 DIAGNOSIS — E11.40 TYPE 2 DIABETES MELLITUS WITH DIABETIC NEUROPATHY, WITHOUT LONG-TERM CURRENT USE OF INSULIN (H): ICD-10-CM

## 2023-11-17 PROCEDURE — 87635 SARS-COV-2 COVID-19 AMP PRB: CPT

## 2023-11-17 PROCEDURE — 99213 OFFICE O/P EST LOW 20 MIN: CPT

## 2023-11-17 RX ORDER — RESPIRATORY SYNCYTIAL VIRUS VACCINE 120MCG/0.5
0.5 KIT INTRAMUSCULAR ONCE
Qty: 1 EACH | Refills: 0 | Status: CANCELLED | OUTPATIENT
Start: 2023-11-17 | End: 2023-11-17

## 2023-11-17 RX ORDER — FLURBIPROFEN SODIUM 0.3 MG/ML
SOLUTION/ DROPS OPHTHALMIC
Qty: 400 EACH | Refills: 0 | Status: SHIPPED | OUTPATIENT
Start: 2023-11-17 | End: 2024-09-09

## 2023-11-17 ASSESSMENT — ENCOUNTER SYMPTOMS: COUGH: 1

## 2023-11-17 NOTE — PATIENT INSTRUCTIONS
Covid test today    Continue to stay well hydrated w/ the prescribed 50 oz of water a day per Nephrology    Utilize OTC medicines as needed to treat symtpoms    If your breathing worsens please follow up to be seen.

## 2023-11-17 NOTE — PATIENT INSTRUCTIONS
"Anger and Irritability Management Skill (AIMS)- suleman developed by the [facility] and has many skills to use to mange anger and irritability       Below are mindfulness activities you can do to reduce stress and stay present in the moment    Mindful eating oranges or apply this to other foods:   https://mindfulnessexercises.[employer]/mindful-eating-oranges/    Mindful walking:  https://www.[employer].org/daily-mindful-walking-practice/     Mindful Showering:  https://[employer]/mindfulness-in-the-shower/    Mindfully listening to music:  https://[employer]/mindfulness-with-music/    Imagine that you are listening to this music for the very first time, notice the instruments, patterns, tempo, lyrics, notice things in detail, how it makes you feel, if your mind wanders, bring it back to your breathing or the sound.        Scheduling worry time before bed, set a timer for a couple of minutes and write down all the thoughts that come to mind, do not  them or think too much about them, just write them down. You can do this as well if you wake up over night, what are those thoughts or worries you have, can you do anything about them, if you can create a plan and if they are outside of your control, remind your brain of that, \"hey brain, we cannot do anything about x, y, z\" but we can do something about \"A\" during this time.     "

## 2023-11-17 NOTE — PROGRESS NOTES
"  Assessment & Plan     (J06.9) Upper respiratory tract infection, unspecified type  (primary encounter diagnosis)  (N18.30) Stage 3 chronic kidney disease, unspecified whether stage 3a or 3b CKD (H)  Comment: Patient history and exam suggestive of upper respiratory infection due to viral etiology.  COVID swabs collected. COVID pending. Discussed paxlovid R/B, drug interactions, SE's.  Discussed the use of OTC medications such as flonase, mucinex, tylenol, and honey for symptomatic treatment. Will follow up on COVID results an whether further direction is necessary.  Patient fully understands and is agreeable with plan of care, at this point patient will follow up as needed unless acute concerns arise in the meantime.  Plan: Symptomatic COVID-19 Virus (Coronavirus) by PCR        Nose    (R73.9) Hyperglycemia  (E11.40) Type 2 diabetes mellitus with diabetic neuropathy, without long-term current use of insulin (H)  Comment: needs insulin pens, refilled.   Plan: insulin pen needle (B-D U/F) 31G X 5 MM         miscellaneous    28 minutes spent by me on the date of the encounter doing chart review, history and exam, documentation and further activities per the note       BMI:   Estimated body mass index is 39.94 kg/m  as calculated from the following:    Height as of this encounter: 1.651 m (5' 5\").    Weight as of this encounter: 108.9 kg (240 lb).     BRIDGET Reynolds CNP  [facility]    Odalis Kearney is a 63 year old, presenting for the following health issues:  Cough        11/17/2023     2:40 PM   Additional Questions   Roomed by Marc REDDING CMA       History of Present Illness       Reason for visit:  Cold  Symptom onset:  3-7 days ago  Symptoms include:  Head hurts, Pain when I cough, runny nose  Symptom intensity:  Moderate  Symptom progression:  Worsening  What makes it worse:  No  What makes it better:  Not that I have found    He eats 0-1 servings of fruits and vegetables daily.He " "consumes 0 sweetened beverage(s) daily.He exercises with enough effort to increase his heart rate 9 or less minutes per day.  He exercises with enough effort to increase his heart rate 3 or less days per week. He is missing 2 dose(s) of medications per week.  He is not taking prescribed medications regularly due to remembering to take.     Patient said that when coughing sides hurts, patient stated that he got tested for COVID but the test was invalid     Acute Illness  Acute illness concerns: URI  Onset/Duration: monday  Symptoms:  Fever: No  Chills/Sweats: YES- chills, has resolved  Headache (location?): YES- frontal, temple  Sinus Pressure: No  Conjunctivitis:  No  Ear Pain: no  Rhinorrhea: YES  Congestion: YES. mild  Sore Throat: YES  Cough: YES - dry cough  Wheeze: YES- intermittent  Decreased Appetite: No  Nausea: No  Vomiting: No  Diarrhea: No  Dysuria/Freq.: No  Dysuria or Hematuria: No  Fatigue/Achiness: YES  Sick/Strep Exposure: No- unknown, however patient went to [place] and unsure if he got anything from plane ride   Therapies tried and outcome: OTC cold medicine, helps mildly      Review of Systems   Respiratory:  Positive for cough.       Constitutional, HEENT, cardiovascular, pulmonary, gi and gu systems are negative, except as otherwise noted.      Objective    /84 (BP Location: Right arm, Patient Position: Sitting, Cuff Size: Adult Large)   Pulse 76   Temp 98.6  F (37  C) (Oral)   Resp 18   Ht 1.651 m (5' 5\")   Wt 108.9 kg (240 lb)   SpO2 98%   BMI 39.94 kg/m    Body mass index is 39.94 kg/m .  Physical Exam  Vitals and nursing note reviewed.   Constitutional:       General: He is not in acute distress.     Appearance: Normal appearance. He is not ill-appearing.   HENT:      Right Ear: Tympanic membrane, ear canal and external ear normal. There is no impacted cerumen.      Left Ear: Tympanic membrane, ear canal and external ear normal. There is no impacted cerumen.      Nose: Congestion " present. No rhinorrhea.      Mouth/Throat:      Mouth: Mucous membranes are moist.      Pharynx: Posterior oropharyngeal erythema present. No oropharyngeal exudate.   Eyes:      General:         Right eye: No discharge.         Left eye: No discharge.      Conjunctiva/sclera: Conjunctivae normal.      Pupils: Pupils are equal, round, and reactive to light.   Cardiovascular:      Rate and Rhythm: Normal rate and regular rhythm.      Heart sounds: No murmur heard.     No friction rub. No gallop.   Pulmonary:      Effort: No respiratory distress.      Breath sounds: Normal breath sounds. No stridor. No wheezing, rhonchi or rales.   Musculoskeletal:      Cervical back: No tenderness.   Lymphadenopathy:      Cervical: No cervical adenopathy.   Skin:     General: Skin is warm and dry.      Capillary Refill: Capillary refill takes less than 2 seconds.   Neurological:      General: No focal deficit present.      Mental Status: He is alert and oriented to person, place, and time.   Psychiatric:         Mood and Affect: Mood normal.         Behavior: Behavior normal.         Thought Content: Thought content normal.         Judgment: Judgment normal.

## 2023-11-17 NOTE — TELEPHONE ENCOUNTER
Nurse Triage SBAR    Is this a 2nd Level Triage? NO    Situation: Pt calls to report cough.    Background: Pt reports returning from [place] on Saturday. Pt denies taking a home covid test. Pt reports his symptoms began on Monday, and have worsened since then. Pt states he has been taking a cough suppressant at home. Pt denies hx of heart or lung disease. Pt denies hx of blood clots.     Assessment: Pt endorses frequent dry cough, rhinorrhea, chest congestion and headache. Pt reports a sharp feeling in the chest while coughing. Pt reports wheezing during the night. Pt denies hemoptysis, SOB, difficulty breathing, chest pain and fever.    Protocol Recommended Disposition:   See in Office Today or Tomorrow    Recommendation: Recommended pt be seen in clinic today for appointment. Pt verbalized understanding and agrees with plan. Scheduled pt to be seen.    Reason for Disposition   Patient wants to be seen    Additional Information   Negative: Bluish (or gray) lips or face   Negative: SEVERE difficulty breathing (e.g., struggling for each breath, speaks in single words)   Negative: Rapid onset of cough and has hives   Negative: Coughing started suddenly after medicine, an allergic food or bee sting   Negative: Difficulty breathing after exposure to flames, smoke, or fumes   Negative: Sounds like a life-threatening emergency to the triager   Negative: MODERATE difficulty breathing (e.g., speaks in phrases, SOB even at rest, pulse 100-120) and still present when not coughing   Negative: Chest pain present when not coughing   Negative: Passed out (i.e., fainted, collapsed and was not responding)   Negative: Patient sounds very sick or weak to the triager   Negative: MILD difficulty breathing (e.g., minimal/no SOB at rest, SOB with walking, pulse <100) and still present when not coughing   Negative: Coughed up > 1 tablespoon (15 ml) blood (Exception: Blood-tinged sputum.)   Negative: Fever > 103 F (39.4 C)   Negative: Fever  > 101 F (38.3 C) and over 60 years of age   Negative: Fever > 100.0 F (37.8 C) and has diabetes mellitus or a weak immune system (e.g., HIV positive, cancer chemotherapy, organ transplant, splenectomy, chronic steroids)   Negative: Fever > 100.0 F (37.8 C) and bedridden (e.g., CVA, chronic illness, recovering from surgery)   Negative: Increasing ankle swelling   Negative: Wheezing is present   Negative: SEVERE coughing spells (e.g., whooping sound after coughing, vomiting after coughing)   Negative: Coughing up luis f-colored (reddish-brown) or blood-tinged sputum   Negative: Fever present > 3 days (72 hours)   Negative: Fever returns after gone for over 24 hours and symptoms worse or not improved   Negative: Using nasal washes and pain medicine > 24 hours and sinus pain persists   Negative: Known COPD or other severe lung disease (i.e., bronchiectasis, cystic fibrosis, lung surgery) and worsening symptoms (i.e., increased sputum purulence or amount, increased breathing difficulty)   Negative: Continuous (nonstop) coughing interferes with work or school and no improvement using cough treatment per Care Advice    Protocols used: Cough-A-REBECCA Hunt RN on 11/17/2023 at 10:05 AM

## 2023-11-18 ENCOUNTER — NURSE TRIAGE (OUTPATIENT)
Dept: NURSING | Facility: CLINIC | Age: 63
End: 2023-11-18
Payer: MEDICARE

## 2023-11-18 DIAGNOSIS — U07.1 CLINICAL DIAGNOSIS OF COVID-19: Primary | ICD-10-CM

## 2023-11-18 LAB — SARS-COV-2 RNA RESP QL NAA+PROBE: POSITIVE

## 2023-11-18 NOTE — PROGRESS NOTES
Called patient given patient positive COVID results. Patient feeling better today and opting out of Paxlovid therapy. Continue to monitor symptoms, follow up if noting rebound or worsening symptoms.     BRIDGET Reynolds CNP

## 2023-11-18 NOTE — TELEPHONE ENCOUNTER
COVID Positive/Requesting COVID treatment    Patient is positive for COVID and requesting treatment options.    Date of positive COVID test (PCR or at home)? Positive PCR 11/17  Current COVID symptoms: cough, fatigue, muscle or body aches, headache, sore throat, and congestion or runny nose  Date COVID symptoms began: Monday     Message should be routed to clinic RN pool. Best phone number to use for call back: Ok to leave message [phone]  Vickie Petit RN on 11/18/2023 at 3:32 PM      Reason for Disposition   [1] HIGH RISK patient (e.g., weak immune system, age > 64 years, obesity with BMI 30 or higher, pregnant, chronic lung disease or other chronic medical condition) AND [2] COVID symptoms (e.g., cough, fever)  (Exceptions: Already seen by PCP and no new or worsening symptoms.)    Additional Information   Negative: SEVERE difficulty breathing (e.g., struggling for each breath, speaks in single words)   Negative: Difficult to awaken or acting confused (e.g., disoriented, slurred speech)   Negative: Bluish (or gray) lips or face now   Negative: Shock suspected (e.g., cold/pale/clammy skin, too weak to stand, low BP, rapid pulse)   Negative: Sounds like a life-threatening emergency to the triager   Negative: [1] Diagnosed or suspected COVID-19 AND [2] symptoms lasting 3 or more weeks   Negative: [1] COVID-19 exposure AND [2] no symptoms   Negative: COVID-19 vaccine reaction suspected (e.g., fever, headache, muscle aches) occurring 1 to 3 days after getting vaccine   Negative: COVID-19 vaccine, questions about   Negative: [1] Lives with someone known to have influenza (flu test positive) AND [2] flu-like symptoms (e.g., cough, runny nose, sore throat, SOB; with or without fever)   Negative: [1] Possible COVID-19 symptoms AND [2] triager concerned about severity of symptoms or other causes   Negative: COVID-19 and breastfeeding, questions about   Negative: SEVERE or constant chest pain or pressure  (Exception:  Mild central chest pain, present only when coughing.)   Negative: MODERATE difficulty breathing (e.g., speaks in phrases, SOB even at rest, pulse 100-120)   Negative: [1] Headache AND [2] stiff neck (can't touch chin to chest)   Negative: Oxygen level (e.g., pulse oximetry) 90 percent or lower   Negative: Chest pain or pressure  (Exception: MILD central chest pain, present only when coughing.)   Negative: [1] Drinking very little AND [2] dehydration suspected (e.g., no urine > 12 hours, very dry mouth, very lightheaded)   Negative: Patient sounds very sick or weak to the triager   Negative: MILD difficulty breathing (e.g., minimal/no SOB at rest, SOB with walking, pulse <100)   Negative: Fever > 103 F (39.4 C)   Negative: [1] Fever > 101 F (38.3 C) AND [2] age > 60 years   Negative: [1] Fever > 100.0 F (37.8 C) AND [2] bedridden (e.g., CVA, chronic illness, recovering from surgery)   Negative: Oxygen level (e.g., pulse oximetry) 91 to 94 percent    Protocols used: Coronavirus (COVID-19) Diagnosed or [id number]

## 2023-11-18 NOTE — PATIENT INSTRUCTIONS
For informational purposes only. Not to replace the advice of your health care provider. Copyright   2022 [facility]. All rights reserved. Clinically reviewed by Aurea Trinidad, PharmD, BCACP. [employer] [id number] - REV 06/23.  COVID-19 Outpatient Treatments  Your care team can help you find the best treatments for COVID-19. Talk to a health care provider or refer to the FDA medicine fact sheets below.    Paxlovid (nirmatrelvir and ritonavir): https://www.paxlovid.[employer]/resources    Molnupiravir (Lagevrio): https://www.fda.gov/media/199608/download  Important: We can only prescribe Paxlovid or Molnupiravir when it can be started within 5 days of first having symptoms.  Paxlovid (nimatrelvir and ritonavir)  How it works  Two medicines (nirmatrelvir and ritonavir) are taken together. They stop the virus from growing. Less amount of virus is easier for your body to fight.  Benefits  Lowers risk of a hospital stay or death from COVID-19.  How to take    Medicine comes in a daily container with both medicine tablets. Take by mouth twice daily (once in the morning, once at night) for 5 days.    The number of tablets to take varies by patient.    Don't chew or break capsules. Swallow whole.  When to take  Take it as soon as possible and within 5 days of your first symptoms.  Who can take it  Patients must be 12 years or older weigh at least 88 pounds. Paxlovid is the preferred treatment for pregnant patients.  Possible side effects  Can cause altered sense of taste, diarrhea (loose, watery stools), high blood pressure, muscle aches.  Medicine conflicts    Some medicines may conflict with Paxlovid and may cause serious side effects.    Tell your care team about all the medicines you take, including prescription and over-the-counter medicines, vitamins, and herbal supplements.    Your care team will review your medicines to make sure that you can safely take Paxlovid.  Cautions    Paxlovid is not advised for  patients with severe kidney or liver disease. If you have kidney or liver problems, the dose may need to be changed.    If you're pregnant or breastfeeding, talk to your care team about your options.    If you take hormonal birth control (such as the Pill), then you or your partner should also use a non-hormonal form of birth control (such as a condom). Keep doing this for 1 menstrual cycle after your last dose of Paxlovid.  Molnupiravir (lagevrio)  How it works  Stops the virus from growing. Less amount of virus is easier for your body to fight.  Benefits  Lowers risk of a hospital stay or death from COVID-19.  How to take  Take 4 capsules by mouth every 12 hours (4 in the morning and 4 at night) for 5 days. Don't chew or break capsules. Swallow whole.  When to take  Take as soon as possible and within 5 days of your first symptoms.  Who can take it  Patients must be 18 years or older.   Possible side effects  Diarrhea (loose, watery stools), nausea (feeling sick to your stomach), dizziness, headaches.  Medicine conflicts  Right now, there are no known conflicts with other drugs. But tell your care team about all medicines you take.  Cautions    This medicine is not advised for patients who are pregnant.    If you are someone who could become pregnant, use trusted birth control until 4 days after your last dose of molnupiravir.    If your partner could become pregnant, you should use trusted birth control until 3 months after your last dose of molnupiravir.

## 2023-11-18 NOTE — TELEPHONE ENCOUNTER
Patient states that he spoke with Dr. Cahtman and they decided that he will not take paxlovid as his symptoms have been improving.     MONIQUE TEJADA RN

## 2023-11-20 ENCOUNTER — TELEPHONE (OUTPATIENT)
Dept: FAMILY MEDICINE | Facility: CLINIC | Age: 63
End: 2023-11-20
Payer: MEDICARE

## 2023-11-20 NOTE — TELEPHONE ENCOUNTER
Pt calls, discussed Mychart alert message about health maintenance alert, saw Herminio last week, not due for Medicare annual wellness visit yet, endo manages DM, no action needed  Lucía Bond, RN, BSN  [facility]

## 2023-11-21 ENCOUNTER — TELEPHONE (OUTPATIENT)
Dept: BEHAVIORAL HEALTH | Facility: CLINIC | Age: 63
End: 2023-11-21
Payer: MEDICARE

## 2023-11-21 NOTE — TELEPHONE ENCOUNTER
Writer spoke with pt and scheduled initial TC therapy appointment on 11/30/2023 @  10:30 am. Writer sent intake documents via [employer]. Writer will reply to referral source.Tracker completed.    Carri Wade  11/21/2023  [phone]

## 2023-11-21 NOTE — TELEPHONE ENCOUNTER
----- Message from Hernan Cavazos sent at 11/21/2023 11:44 AM CST -----  Transition Clinic Referral   [place]         Please Check Type of Referral Requested:       __x__THERAPY: The Transition clinic is able to schedule patients without current medical insurance; these patient will be referred to our Social Work Care Coordinator for Medical Insurance              Assistance. We are open for referral for psychotherapy. Patient is referred from:  [facility]      Referring Provider Contact Name: [facility]; Phone Number: [phone]    Reason for Transition Clinic Referral: therapy    Next Level of Care Patient Will Be Transitioned To: [facility]  Provider(s)Cara Capellan  Location [facility]  Date/Time 3/5/2023    What Would Be Helpful from the Transition Clinic: therapy     Needs: NO    Does Patient Have Access to Technology: yes    Patient E-mail Address: [email or web address]    Current Patient Phone Number: [phone]; n/a    Clinician Gender Preference (if applicable): unknown    Patient location preference: Virtual    Hernan Cavazos

## 2023-11-27 NOTE — PROGRESS NOTES
Virtual Visit Details    Type of service:  Video Visit   Video Start Time: 10:36AM  Video End Time:10:47AM  Originating Location (pt. Location): Home    Distant Location (provider location):  Off-site  Platform used for Video Visit: [facility]   Outpatient Sleep Medicine  Nov 28, 2023     Name: Steve Morgan MRN# [id number]   Age: 63 year old YOB: 1960            Assessment and Plan:   Upper airway resistance syndrome/DAMIÁN on CPAP  Patient's sleep apnea is adequately managed and well treated with current PAP settings [id number] with low residual AHI of 1.3 events per hour. Compliance is excellent. Tolerating PAP well. Daytime symptoms and nighttime sleep quality are improved with use of machine. No indication to adjust settings today. Will continue nightly therapy. Prescription updated for mask/supplies. Follow-up about 1 year for annual PAP follow-up visit or sooner prn.   - Comprehensive DME       Chief Complaint      Chief Complaint   Patient presents with    RECHECK          History of Present Illness:     Steve Morgan is a 63 year old male with HTN, DM with peripheral neuropathy, hypothyroidism, PAD, obesity, and depression who presents to the clinic for follow-up of their severe upper airway resistance syndrome/obstructive sleep apnea treated with CPAP.      Originally diagnosed with DAMIÁN 10/3/2002 through [facility] ([id number]#) with split night study revealing AHI 31/hr, RDI 46/hr, oxygen johnnie 83%; CPAP 8cm identified as optimal setting and started CPAP at that time. More recently, he had a split night PSG completed on 1/18/2010 through [facility] revealing predominantly respiratory effort related arousals and severe upper airway resistance syndrome with an AHI 1.6 and RDI 33.8; CPAP 8cmH2O again adequately treated sleep disordered breathing events. He was treated with CPAP 8cmH2O for a number of years until switched to auto-CPAP [id number] in 12/2014 due to  "residular snoring and weight gain (pt was 104kg at time of study, 107kg in 12/2014). Replacement machine through [facility] on 8/14/2020.      Last seen 10/18/2022. Returns today for annual PAP follow-up visit. Overall, the patient rates their experience with PAP as 10 (0 poor, 10 great). Patient is using a nasal mask. The mask is comfortable. The mask is not leaking. They were snoring with the mask on per wife couple nights on vacation when they were sleeping in same bed, but typically sleeping separate at home and no known snoring at home. Alcohol was involved on vacation. They are not having gasp arousals. They are not having significant oral/nasal dryness or epistaxis.  They are not having pain/skin breakdown. The pressure settings are comfortable.      Bedtime is typically 9:30-10:00AM. Usually it takes about 30 minutes to fall asleep with the mask on. Wake time is typically 5:40AM 3 days a week, 8:30AM remainder of week.  Patient is using PAP therapy 8+ hours per night. The patient is usually getting 8+ hours of sleep per night. Does feel well rested in AM.     ResMed Auto-PAP 7-15 cmH2O download:  30 total days of use. 0 nonuse days. 30 days with >4 hours use.  Average use 9 hours 31 minutes per day. Median Leak 4.0 L/min. 95%ile Leak 12.8 L/min. CPAP 95% pressure 11.1cm. AHI 1.3    SCALES:   INSOMNIA: Insomnia Severity Score: 5/28   SLEEPINESS: [place] Sleepiness Score: 9/24    Past medical/surgical history, family history, social history, medications and allergies were reviewed.           Physical Examination:   Ht 1.651 m (5' 5\")   Wt 109.3 kg (241 lb)   BMI 40.10 kg/m    General appearance: Awake, alert, cooperative. Well groomed. Sitting comfortably in chair. In no apparent distress.  HEENT: Head: Normocephalic, atraumatic. Eyes:Conjunctiva clear. Sclera normal. Nose: External appearance without deformity.   Pulmonary:  Able to speak easily in full sentences. No cough or wheeze.   Skin:  No rashes or " significant lesions on visible skin.   Neurologic: Alert, oriented x3.   Psychiatric: Mood euthymic. Affect congruent with full range and intensity.      CC:  Lisa Pierre PA-C  Nov 28, 2023     [facility]  [address] , [place] [address]     [facility]  [address] [facility] [address], [place] [address]    Chart documentation was completed, in part, with [employer] voice-recognition software. Even though reviewed, some grammatical, spelling, and word errors may remain.    21 minutes spent on day of encounter doing chart review, history and exam, documentation, and further activities as noted above

## 2023-11-28 ENCOUNTER — VIRTUAL VISIT (OUTPATIENT)
Dept: SLEEP MEDICINE | Facility: CLINIC | Age: 63
End: 2023-11-28
Payer: MEDICARE

## 2023-11-28 ENCOUNTER — TELEPHONE (OUTPATIENT)
Dept: BEHAVIORAL HEALTH | Facility: CLINIC | Age: 63
End: 2023-11-28

## 2023-11-28 VITALS — HEIGHT: 65 IN | BODY MASS INDEX: 40.15 KG/M2 | WEIGHT: 241 LBS

## 2023-11-28 DIAGNOSIS — G47.8 UPPER AIRWAY RESISTANCE SYNDROME: Primary | ICD-10-CM

## 2023-11-28 DIAGNOSIS — N18.30 STAGE 3 CHRONIC KIDNEY DISEASE, UNSPECIFIED WHETHER STAGE 3A OR 3B CKD (H): Primary | ICD-10-CM

## 2023-11-28 DIAGNOSIS — G47.33 OSA ON CPAP: ICD-10-CM

## 2023-11-28 PROCEDURE — 99213 OFFICE O/P EST LOW 20 MIN: CPT | Mod: VID | Performed by: PHYSICIAN ASSISTANT

## 2023-11-28 ASSESSMENT — SLEEP AND FATIGUE QUESTIONNAIRES
HOW LIKELY ARE YOU TO NOD OFF OR FALL ASLEEP WHILE SITTING QUIETLY AFTER LUNCH WITHOUT ALCOHOL: SLIGHT CHANCE OF DOZING
HOW LIKELY ARE YOU TO NOD OFF OR FALL ASLEEP WHILE WATCHING TV: SLIGHT CHANCE OF DOZING
HOW LIKELY ARE YOU TO NOD OFF OR FALL ASLEEP WHILE SITTING AND READING: SLIGHT CHANCE OF DOZING
HOW LIKELY ARE YOU TO NOD OFF OR FALL ASLEEP WHILE SITTING AND TALKING TO SOMEONE: WOULD NEVER DOZE
HOW LIKELY ARE YOU TO NOD OFF OR FALL ASLEEP WHILE SITTING INACTIVE IN A PUBLIC PLACE: WOULD NEVER DOZE
HOW LIKELY ARE YOU TO NOD OFF OR FALL ASLEEP IN A CAR, WHILE STOPPED FOR A FEW MINUTES IN TRAFFIC: WOULD NEVER DOZE
HOW LIKELY ARE YOU TO NOD OFF OR FALL ASLEEP WHILE LYING DOWN TO REST IN THE AFTERNOON WHEN CIRCUMSTANCES PERMIT: HIGH CHANCE OF DOZING
HOW LIKELY ARE YOU TO NOD OFF OR FALL ASLEEP WHEN YOU ARE A PASSENGER IN A CAR FOR AN HOUR WITHOUT A BREAK: HIGH CHANCE OF DOZING

## 2023-11-28 ASSESSMENT — PAIN SCALES - GENERAL: PAINLEVEL: NO PAIN (0)

## 2023-11-28 NOTE — NURSING NOTE
Is the patient currently in the state of [place]? YES    Visit mode:VIDEO    If the visit is dropped, the patient can be reconnected by: VIDEO VISIT: Send to e-mail at: [email or web address]    Will anyone else be joining the visit? NO  (If patient encounters technical issues they should call [phone]    How would you like to obtain your AVS? MyChart    Are changes needed to the allergy or medication list? No    Reason for visit: RECHECK  Has patient had flu shot for current/most recent flu season? If so, when? Yes: 10/04/2023    Ruthie GORDILLO

## 2023-11-28 NOTE — TELEPHONE ENCOUNTER
Writer spoke with patient to reschedule patient for 11/30/2023 @ 2:00 pm. Tracker completed.    Carri Wade  11/28/2023  [phone]

## 2023-11-30 ENCOUNTER — VIRTUAL VISIT (OUTPATIENT)
Dept: BEHAVIORAL HEALTH | Facility: CLINIC | Age: 63
End: 2023-11-30
Payer: MEDICARE

## 2023-11-30 ENCOUNTER — TELEPHONE (OUTPATIENT)
Dept: BEHAVIORAL HEALTH | Facility: CLINIC | Age: 63
End: 2023-11-30
Payer: MEDICARE

## 2023-11-30 DIAGNOSIS — F33.0 MILD EPISODE OF RECURRENT MAJOR DEPRESSIVE DISORDER (H): Primary | ICD-10-CM

## 2023-11-30 ASSESSMENT — PATIENT HEALTH QUESTIONNAIRE - PHQ9: SUM OF ALL RESPONSES TO PHQ QUESTIONS 1-9: 8

## 2023-11-30 NOTE — PROGRESS NOTES
Transition Clinic - Initial Visit Progress Note    Patient  Name: Steve Morgan, : 1960    Date:  2023       Service Type:  Mental Health Initial Visit     Visit Start Time:930 Visit End Time:     Session Length:   TC Session Length: 45 (38-52 Minutes)    Visit #: 1    Attendees:  TC Attendees: Client alone    Service Modality:  Service Modality: Video Visit:      Provider verified identity through the following two step process.  Patient provided:  Patient     Telemedicine Visit: The patient's condition can be safely assessed and treated via synchronous audio and visual telemedicine encounter.      Reason for Telemedicine Visit: Patient has requested telehealth visit    Originating Site (Patient Location): Patient's home    Distant Site (Provider Location): Provider Remote Setting- Home Office    Consent:  The patient/guardian has verbally consented to: the potential risks and benefits of telemedicine (video visit) versus in person care; bill my insurance or make self-payment for services provided; and responsibility for payment of non-covered services.     Patient would like the video invitation sent by:  Send to e-mail at: [email or web address]    Mode of Communication:  Video Conference via [employer]    Distant Location (Provider):  Off-site    As the provider I attest to compliance with applicable laws and regulations related to telemedicine.    Informed Consent and Assessment Methods    This provider and patient discussed HIPAA, and the limits of confidentiality; including mandated reporting, the possibility of collaborative discussions with patient's primary care provider and the multidisciplinary team in the MH clinic during consultation.  Discussed the no show policy, and the benefits and possible unintended consequences of therapy.  Patient indicated understanding Transition Clinic services are short term, solution focused, until a referral can be made and patient can bridge to long  term therapy.  Patient verbally indicated understanding the informed consent.         Presenting Concerns/  Current Stressors:  Steve Morgan is a 63 year old identified Male who was referred to the Transition Clinic by PCP for  bridging therapy until he can be scheduled with long-term Therapist. Steve Morgan reports   That he has experienced multiple medical issues and does not have the energy or patience that he once had.  Patient is .  He and his wife have 2 grown children 1 of which recently got .  Their 34-year-old son resides with them.   Patient indicated that his family has mentioned to him that his level of tolerance has diminished and he is concerned that this is affecting all members of his family particularly his marriage.    ASSESSMENT:    Required Screenings: The following assessments were completed by patient for this visit:  PHQ9:       5/25/2023     1:19 PM 6/8/2023    12:46 PM 6/30/2023     8:20 AM 8/17/2023     7:46 AM 8/23/2023     2:13 PM 11/16/2023    10:08 AM 11/30/2023     1:00 PM   PHQ-9 SCORE   PHQ-9 Total Score MyChart 16 (Moderately severe depression) 18 (Moderately severe depression) 7 (Mild depression) 14 (Moderate depression) 10 (Moderate depression) 8 (Mild depression)    PHQ-9 Total Score 16 18 7 14 10 8 8     PROMIS-10    In general, would you say your health is:: 3    In general, would you say your quality of life is:: 2    In general, how would you rate your physical health?: 2    In general, how would you rate your mental health, including your mood and your ability to think?: 2    In general, how would you rate your satisfaction with your social activities and relationships?: 2    In general, please rate how well you carry out your usual social activities and roles. (This includes activities at home, at work and in your community, and responsibilities as a parent, child, spouse, employee, friend, etc.): 3    To what extent are you able to carry out your  everyday physical activities such as walking, climbing stairs, carrying groceries, or moving a chair?: 3    In the past 7 days, how often have you been bothered by emotional problems such as feeling anxious, depressed, or irritable?: 4  In the past 7 days, how would you rate your fatigue on average?: 3  In the past 7 days, how would you rate your pain on average, where 0 means no pain, and 10 means worst imaginable pain?: 1    PROMIS GLOBAL SCORES    Mental health question re-calculation - no clinical value - Mental health question re-calculation - no clinical value: 2  Physical health question re-calculation - no clinical value - Physical health question re-calculation - no clinical value: 3  Pain question re-calculation - no clinical value - Pain question re-calculation - no clinical value: 4  Global Mental Health Score - Global Mental Health Score: 8  Global Physical Health Score - Global Physical Health Score: 12  PROMIS TOTAL - SUBSCORES - PROMIS TOTAL - SUBSCORES: 20      [id number] PROMIS HEALTH ORGANIZATION AND PROMIS COOPERATIVE GROUP VERSION 1.1      GAD7:       9/24/2021     7:01 AM 11/16/2021    10:11 AM 10/17/2022    10:38 AM 12/20/2022     9:43 AM 1/27/2023    10:03 AM 3/10/2023     1:48 PM 6/30/2023     8:21 AM   JOSE MANUEL-7 SCORE   Total Score 4 (minimal anxiety) 3 (minimal anxiety)  2 (minimal anxiety) 2 (minimal anxiety) 5 (mild anxiety) 3 (minimal anxiety)   Total Score 4 3 6 2 2 5 3     CAGE-AID:       10/14/2020    10:58 AM 12/1/2022    11:42 AM   CAGE-AID Total Score   Total Score 0 0   Total Score MyChart 0 (A total score of 2 or greater is considered clinically significant) 0 (A total score of 2 or greater is considered clinically significant)     PROMIS 10-Global Health (all questions and answers displayed):       10/27/2022    12:43 PM 5/15/2023    10:44 AM 5/21/2023     4:35 PM 8/23/2023     2:16 PM 11/16/2023    10:10 AM   PROMIS 10   In general, would you say your health is: Fair Fair Fair Fair  Good   In general, would you say your quality of life is: Good Good Very good Good Good   In general, how would you rate your physical health? Poor Fair Poor Poor Fair   In general, how would you rate your mental health, including your mood and your ability to think? Good Fair Fair Fair Fair   In general, how would you rate your satisfaction with your social activities and relationships? Good Fair Fair Good Fair   In general, please rate how well you carry out your usual social activities and roles Fair Good Fair Fair Good   To what extent are you able to carry out your everyday physical activities such as walking, climbing stairs, carrying groceries, or moving a chair? Mostly Moderately Mostly Completely Moderately   In the past 7 days, how often have you been bothered by emotional problems such as feeling anxious, depressed, or irritable? Sometimes Often Often Sometimes Often   In the past 7 days, how would you rate your fatigue on average? Moderate Moderate Moderate Mild Moderate   In the past 7 days, how would you rate your pain on average, where 0 means no pain, and 10 means worst imaginable pain? 2 1 0 0 1   In general, would you say your health is: 2 2 2 2 3   In general, would you say your quality of life is: 3 3 4 3 3   In general, how would you rate your physical health? 1 2 1 1 2   In general, how would you rate your mental health, including your mood and your ability to think? 3 2 2 2 2   In general, how would you rate your satisfaction with your social activities and relationships? 3 2 2 3 2   In general, please rate how well you carry out your usual social activities and roles. (This includes activities at home, at work and in your community, and responsibilities as a parent, child, spouse, employee, friend, etc.) 2 3 2 2 3   To what extent are you able to carry out your everyday physical activities such as walking, climbing stairs, carrying groceries, or moving a chair? 4 3 4 5 3   In the past 7 days,  how often have you been bothered by emotional problems such as feeling anxious, depressed, or irritable? 3 4 4 3 4   In the past 7 days, how would you rate your fatigue on average? 3 3 3 2 3   In the past 7 days, how would you rate your pain on average, where 0 means no pain, and 10 means worst imaginable pain? 2 1 0 0 1   Global Mental Health Score 12 9 10 11 9   Global Physical Health Score 12 12 13 15 12   PROMIS TOTAL - SUBSCORES 24 21 23 26 21     [place] Suicide Severity Rating Scale (Lifetime/Recent)      10/14/2020     1:00 PM 10/17/2022    10:24 AM   [place] Suicide Severity Rating (Lifetime/Recent)   Q1 Wish to be Dead (Lifetime) No    RETIRED: 1. Wish to be Dead (Recent) No    Q1 Wish to be Dead (Lifetime)  N   Q2 Non-Specific Active Suicidal Thoughts (Lifetime)  N   Actual Attempt (Lifetime)  N   Has subject engaged in non-suicidal self-injurious behavior? (Lifetime)  N   Interrupted Attempts (Lifetime)  N   Aborted or Self-Interrupted Attempt (Lifetime)  N   Preparatory Acts or Behavior (Lifetime)  N   Calculated C-SSRS Risk Score (Lifetime/Recent)  No Risk Indicated       Mental Status Assessment:  Appearance:   Appropriate   Eye Contact:   Good   Psychomotor Behavior: Normal   Attitude:   Cooperative   Orientation:   All  Speech     Rate / Production:  Normal/ Responsive     Volume:   Normal   Mood:    Normal  Affect:    Appropriate   Thought Content:  Clear   Thought Form:  Coherent   Insight:    Good       Safety Issues and Plan for Safety and Risk Management:     Patient denies current fears or concerns for personal safety.  Patient denies current or recent suicidal ideation or behaviors.  Patient denies current or recent homicidal ideation or behaviors.  Patient denies current or recent self injurious behavior or ideation.  Patient denies other safety concerns.  Recommended that patient call 911 or go to the local ED should there be a change in any of these risk factors.  Patient reports there  are no firearms in the house.     Diagnostic Criteria:  Major Depressive Disorder  A) Recurrent episode(s) - symptoms have been present during the same 2-week period and represent a change from previous functioning 5 or more symptoms (required for diagnosis)   - Diminished interest or pleasure in all, or almost all, activities.    - Fatigue or loss of energy.    - Feelings of worthlessness or excessive guilt.    - Diminished ability to think or concentrate, or indecisiveness.     DSM5 Diagnoses: (Sustained by DSM5 Criteria Listed Above)  Diagnoses: 296.31 (F33.0) Major Depressive Disorder, Recurrent Episode, Mild _ and With anxious distress  Psychosocial & Contextual Factors:       Intervention:   Solution Focused Brief Therapy:    Cognitive Behavioral Therapy (CBT) - Discussed changing thoughts is the path to changing behaviors and feelings. and Solution-Focused Brief Therapy (SFBT) - Change is perpetual, focus on the problematic excerptions. Reality is subjective and social constructed through conversation.    Collateral Reports Completed:  TC Collateral: [facility] electronic medical records reviewed.    DATA:  Interactive Complexity: No  Crisis: No    PLAN: (Homework, other):  Provider will continue Diagnostic Assessment. Initial Treatment will focus on: Depressed Mood -  deep breathing techniques and strategies .  2.   Provider recommended the following referrals: Scheduled to see a long-term therapist in 2024.    3.   Information in this assessment was obtained from the medical record and provided by patient who is a good historian.   4.   Follow up scheduled:   1 week        Patient was given the following to do until next session:   medication techniques that were reviewed during the session today seek to understand rather than respond initially  5.  Anticipated Discharge: Anticipated Discharge Time: 1 - 3 Months     Procedure Code:  Psychotherapy (with patient) - 45 [CPT 58749]    Margaret Asif  [facility]  11/30/23    Suicide Risk and Safety Concerns were assessed for. Patient meets the following risk assessment and triage: Patient denied any current/recent/lifetime history of suicidal ideation and/or behaviors.  No safety plan indicated at this time.

## 2023-11-30 NOTE — TELEPHONE ENCOUNTER
Reached out to patient to schedule with a provider that works with Medicare. Rescheduled with Margaret paula at 9:30am.    Aurea Muller  Transition Clinic Coordinator  11/30/23 8:56 AM'

## 2023-12-05 ENCOUNTER — VIRTUAL VISIT (OUTPATIENT)
Dept: BEHAVIORAL HEALTH | Facility: CLINIC | Age: 63
End: 2023-12-05
Payer: MEDICARE

## 2023-12-05 ENCOUNTER — LAB (OUTPATIENT)
Dept: LAB | Facility: CLINIC | Age: 63
End: 2023-12-05
Payer: MEDICARE

## 2023-12-05 DIAGNOSIS — Z79.4 TYPE 2 DIABETES MELLITUS WITH DIABETIC NEUROPATHY, WITH LONG-TERM CURRENT USE OF INSULIN (H): ICD-10-CM

## 2023-12-05 DIAGNOSIS — E11.40 TYPE 2 DIABETES MELLITUS WITH DIABETIC NEUROPATHY, WITH LONG-TERM CURRENT USE OF INSULIN (H): ICD-10-CM

## 2023-12-05 DIAGNOSIS — F33.1 MAJOR DEPRESSIVE DISORDER, RECURRENT EPISODE, MODERATE (H): ICD-10-CM

## 2023-12-05 DIAGNOSIS — N18.30 STAGE 3 CHRONIC KIDNEY DISEASE, UNSPECIFIED WHETHER STAGE 3A OR 3B CKD (H): Primary | ICD-10-CM

## 2023-12-05 DIAGNOSIS — F33.0 MAJOR DEPRESSIVE DISORDER, RECURRENT EPISODE, MILD (H): Primary | ICD-10-CM

## 2023-12-05 LAB
ALBUMIN SERPL BCG-MCNC: 4.1 G/DL (ref 3.5–5.2)
ALP SERPL-CCNC: 68 U/L (ref 40–150)
ALT SERPL W P-5'-P-CCNC: 21 U/L (ref 0–70)
ANION GAP SERPL CALCULATED.3IONS-SCNC: 12 MMOL/L (ref 7–15)
AST SERPL W P-5'-P-CCNC: 22 U/L (ref 0–45)
BILIRUB SERPL-MCNC: 0.2 MG/DL
BUN SERPL-MCNC: 34 MG/DL (ref 8–23)
CALCIUM SERPL-MCNC: 9.5 MG/DL (ref 8.8–10.2)
CHLORIDE SERPL-SCNC: 98 MMOL/L (ref 98–107)
CREAT SERPL-MCNC: 1.82 MG/DL (ref 0.67–1.17)
DEPRECATED HCO3 PLAS-SCNC: 22 MMOL/L (ref 22–29)
EGFRCR SERPLBLD CKD-EPI 2021: 41 ML/MIN/1.73M2
GLUCOSE SERPL-MCNC: 307 MG/DL (ref 70–99)
HBA1C MFR BLD: 8.2 % (ref 0–5.6)
HGB BLD-MCNC: 10.3 G/DL (ref 13.3–17.7)
PHOSPHATE SERPL-MCNC: 3.2 MG/DL (ref 2.5–4.5)
POTASSIUM SERPL-SCNC: 5 MMOL/L (ref 3.4–5.3)
PROT SERPL-MCNC: 7.4 G/DL (ref 6.4–8.3)
SODIUM SERPL-SCNC: 132 MMOL/L (ref 135–145)

## 2023-12-05 PROCEDURE — 80053 COMPREHEN METABOLIC PANEL: CPT

## 2023-12-05 PROCEDURE — 84100 ASSAY OF PHOSPHORUS: CPT

## 2023-12-05 PROCEDURE — 85018 HEMOGLOBIN: CPT

## 2023-12-05 PROCEDURE — 83036 HEMOGLOBIN GLYCOSYLATED A1C: CPT

## 2023-12-05 PROCEDURE — 36415 COLL VENOUS BLD VENIPUNCTURE: CPT

## 2023-12-06 DIAGNOSIS — N18.30 STAGE 3 CHRONIC KIDNEY DISEASE, UNSPECIFIED WHETHER STAGE 3A OR 3B CKD (H): Primary | ICD-10-CM

## 2023-12-08 DIAGNOSIS — N18.30 STAGE 3 CHRONIC KIDNEY DISEASE, UNSPECIFIED WHETHER STAGE 3A OR 3B CKD (H): Primary | ICD-10-CM

## 2023-12-11 ENCOUNTER — TELEPHONE (OUTPATIENT)
Dept: FAMILY MEDICINE | Facility: CLINIC | Age: 63
End: 2023-12-11
Payer: MEDICARE

## 2023-12-11 NOTE — TELEPHONE ENCOUNTER
Lisa Pierre PA-C    Patient has been notified by pcp and nephrology both regarding this as message was sent to both    Nephrology -   Advised patient to recheck lab this week (RN scheduled for 12/14/2023)     Patient states he has a fluid restriction so is unable to drink more than 50 oz per day     Marcela Blackburn, Registered Nurse, PAL (Patient Advocate Liaison)   [facility]   [phone]

## 2023-12-11 NOTE — TELEPHONE ENCOUNTER
Please relay both messages to pt. He has not read his mychart.    Dear Caio,     Here are your recent results. Sodium is minimally lower- I'm not concerned as long as it doesn't go lower; kidney values are slightly higher and defer to your Nephrologist and Primary Care Provider. I have forwarded to your Primary Care Provider.     Please let us know if you have any questions or concerns.     Regards,  MD Caio Morrow,   Your sodium is low, but stable at 132.   Your urea nitrogen was up, as was creatinine and GFR (kidney function) was down a bit.     I would like you to come in for a lab only appointment in 7-14 days for a repeat check. You do NOT need to fast, but DO drink plenty of water before the appointment.     Lisa Pierre PA-C

## 2023-12-14 ENCOUNTER — LAB (OUTPATIENT)
Dept: LAB | Facility: CLINIC | Age: 63
End: 2023-12-14
Payer: MEDICARE

## 2023-12-14 DIAGNOSIS — N18.30 STAGE 3 CHRONIC KIDNEY DISEASE, UNSPECIFIED WHETHER STAGE 3A OR 3B CKD (H): ICD-10-CM

## 2023-12-14 LAB
ANION GAP SERPL CALCULATED.3IONS-SCNC: 12 MMOL/L (ref 7–15)
BUN SERPL-MCNC: 28.5 MG/DL (ref 8–23)
CALCIUM SERPL-MCNC: 9.8 MG/DL (ref 8.8–10.2)
CHLORIDE SERPL-SCNC: 102 MMOL/L (ref 98–107)
CREAT SERPL-MCNC: 1.67 MG/DL (ref 0.67–1.17)
DEPRECATED HCO3 PLAS-SCNC: 21 MMOL/L (ref 22–29)
EGFRCR SERPLBLD CKD-EPI 2021: 45 ML/MIN/1.73M2
GLUCOSE SERPL-MCNC: 170 MG/DL (ref 70–99)
POTASSIUM SERPL-SCNC: 5.2 MMOL/L (ref 3.4–5.3)
SODIUM SERPL-SCNC: 135 MMOL/L (ref 135–145)

## 2023-12-14 PROCEDURE — 80048 BASIC METABOLIC PNL TOTAL CA: CPT

## 2023-12-14 PROCEDURE — 36415 COLL VENOUS BLD VENIPUNCTURE: CPT

## 2023-12-19 ENCOUNTER — VIRTUAL VISIT (OUTPATIENT)
Dept: BEHAVIORAL HEALTH | Facility: CLINIC | Age: 63
End: 2023-12-19
Payer: MEDICARE

## 2023-12-19 DIAGNOSIS — F33.0 MAJOR DEPRESSIVE DISORDER, RECURRENT EPISODE, MILD (H): Primary | ICD-10-CM

## 2023-12-19 PROCEDURE — 99207 PR NO CHARGE LOS: CPT | Mod: 95 | Performed by: SOCIAL WORKER

## 2023-12-19 NOTE — PROGRESS NOTES
Transition Clinic Progress Note    Patient Name:   Steve Morgan Date: December 19, 2023          Service Type: Individual      Session Start Time: 1030  Session End Time: 1122     Session Length:  TC Session Length: 45 (38-52 Minutes)    Session #: 3    Attendees: TC Attendees: Client alone    Service Modality:  Service Modality: Video Visit:      Provider verified identity through the following two step process.  Patient provided:  Patient is known previously to provider    Telemedicine Visit: The patient's condition can be safely assessed and treated via synchronous audio and visual telemedicine encounter.      Reason for Telemedicine Visit: Patient has requested telehealth visit    Originating Site (Patient Location): Patient's home    Distant Site (Provider Location): [facility]    Consent:  The patient/guardian has verbally consented to: the potential risks and benefits of telemedicine (video visit) versus in person care; bill my insurance or make self-payment for services provided; and responsibility for payment of non-covered services.     Patient would like the video invitation sent by:  Send to e-mail at: [email or web address]    Mode of Communication:  Video Conference via [employer] [facility]    Distant Location (Provider):  On-site    As the provider I attest to compliance with applicable laws and regulations related to telemedicine.    Informed Consent and Assessment Methods    This provider and patient discussed HIPAA, and the limits of confidentiality; including mandated reporting, the possibility of collaborative discussions with patient's primary care provider and the multidisciplinary team in the MH clinic during consultation.  Discussed the no show policy, and the benefits and possible unintended consequences of therapy.  Patient indicated understanding Transition Clinic services are short term, solution focused, until a referral can be made and patient  can bridge to long term therapy.  Patient verbally indicated understanding the informed consent.        DATA  Interactive Complexity: No  Crisis: No        Progress Since Last Session (Related to Symptoms / Goals / Homework):   Symptoms: Improving attempting to be more cognizant of what he is saying before verbalizing his concerns to his family.  Homework: Did not complete      Episode of Care Goals: Minimal progress - PREPARATION (Decided to change - considering how); Intervened by negotiating a change plan and determining options / strategies for behavior change, identifying triggers, exploring social supports, and working towards setting a date to begin behavior change     Current / Ongoing Stressors and Concerns:   Increased blood pressure, irritability while driving       Treatment Objective(s) Addressed in This Session:   identify 3 stressors which contribute to feelings of anxiety       Intervention:   Solution Focused Brief Therapy:    Cognitive Behavioral Therapy (CBT) - Discussed changing thoughts is the path to changing behaviors and feelings. and Solution-Focused Brief Therapy (SFBT) - Change is perpetual, focus on the problematic excerptions. Reality is subjective and social constructed through conversation.    Assessments completed prior to visit:  The following assessments were completed by patient for this visit:  PHQ9:       5/25/2023     1:19 PM 6/8/2023    12:46 PM 6/30/2023     8:20 AM 8/17/2023     7:46 AM 8/23/2023     2:13 PM 11/16/2023    10:08 AM 11/30/2023     1:00 PM   PHQ-9 SCORE   PHQ-9 Total Score MyChart 16 (Moderately severe depression) 18 (Moderately severe depression) 7 (Mild depression) 14 (Moderate depression) 10 (Moderate depression) 8 (Mild depression)    PHQ-9 Total Score 16 18 7 14 10 8 8     GAD7:       9/24/2021     7:01 AM 11/16/2021    10:11 AM 10/17/2022    10:38 AM 12/20/2022     9:43 AM 1/27/2023    10:03 AM 3/10/2023     1:48 PM 6/30/2023     8:21 AM   JOSE MANUEL-7 SCORE    Total Score 4 (minimal anxiety) 3 (minimal anxiety)  2 (minimal anxiety) 2 (minimal anxiety) 5 (mild anxiety) 3 (minimal anxiety)   Total Score 4 3 6 2 2 5 3     CAGE-AID:       10/14/2020    10:58 AM 12/1/2022    11:42 AM   CAGE-AID Total Score   Total Score 0 0   Total Score MyChart 0 (A total score of 2 or greater is considered clinically significant) 0 (A total score of 2 or greater is considered clinically significant)     PROMIS 10-Global Health (all questions and answers displayed):       10/27/2022    12:43 PM 5/15/2023    10:44 AM 5/21/2023     4:35 PM 8/23/2023     2:16 PM 11/16/2023    10:10 AM 12/1/2023     4:00 PM   PROMIS 10   In general, would you say your health is: Fair Fair Fair Fair Good    In general, would you say your quality of life is: Good Good Very good Good Good    In general, how would you rate your physical health? Poor Fair Poor Poor Fair    In general, how would you rate your mental health, including your mood and your ability to think? Good Fair Fair Fair Fair    In general, how would you rate your satisfaction with your social activities and relationships? Good Fair Fair Good Fair    In general, please rate how well you carry out your usual social activities and roles Fair Good Fair Fair Good    To what extent are you able to carry out your everyday physical activities such as walking, climbing stairs, carrying groceries, or moving a chair? Mostly Moderately Mostly Completely Moderately    In the past 7 days, how often have you been bothered by emotional problems such as feeling anxious, depressed, or irritable? Sometimes Often Often Sometimes Often    In the past 7 days, how would you rate your fatigue on average? Moderate Moderate Moderate Mild Moderate    In the past 7 days, how would you rate your pain on average, where 0 means no pain, and 10 means worst imaginable pain? 2 1 0 0 1    In general, would you say your health is: 2 2 2 2 3 3   In general, would you say your quality  of life is: 3 3 4 3 3 2   In general, how would you rate your physical health? 1 2 1 1 2 2   In general, how would you rate your mental health, including your mood and your ability to think? 3 2 2 2 2 2   In general, how would you rate your satisfaction with your social activities and relationships? 3 2 2 3 2 2   In general, please rate how well you carry out your usual social activities and roles. (This includes activities at home, at work and in your community, and responsibilities as a parent, child, spouse, employee, friend, etc.) 2 3 2 2 3 3   To what extent are you able to carry out your everyday physical activities such as walking, climbing stairs, carrying groceries, or moving a chair? 4 3 4 5 3 3   In the past 7 days, how often have you been bothered by emotional problems such as feeling anxious, depressed, or irritable? 3 4 4 3 4 4   In the past 7 days, how would you rate your fatigue on average? 3 3 3 2 3 3   In the past 7 days, how would you rate your pain on average, where 0 means no pain, and 10 means worst imaginable pain? 2 1 0 0 1 1   Global Mental Health Score 12 9 10 11 9 8   Global Physical Health Score 12 12 13 15 12 12   PROMIS TOTAL - SUBSCORES 24 21 23 26 21 20     [place] Suicide Severity Rating Scale (Lifetime/Recent)      10/14/2020     1:00 PM 10/17/2022    10:24 AM   [place] Suicide Severity Rating (Lifetime/Recent)   Q1 Wish to be Dead (Lifetime) No    RETIRED: 1. Wish to be Dead (Recent) No    Q1 Wish to be Dead (Lifetime)  N   Q2 Non-Specific Active Suicidal Thoughts (Lifetime)  N   Actual Attempt (Lifetime)  N   Has subject engaged in non-suicidal self-injurious behavior? (Lifetime)  N   Interrupted Attempts (Lifetime)  N   Aborted or Self-Interrupted Attempt (Lifetime)  N   Preparatory Acts or Behavior (Lifetime)  N   Calculated C-SSRS Risk Score (Lifetime/Recent)  No Risk Indicated         ASSESSMENT: Current Emotional / Mental Status (status of significant symptoms):   Risk  status (Self / Other harm or suicidal ideation)   Patient denies current fears or concerns for personal safety.   Patient denies current or recent suicidal ideation or behaviors.   Patient denies current or recent homicidal ideation or behaviors.   Patient denies current or recent self injurious behavior or ideation.   Patient denies other safety concerns.   Patient reports there has been no change in risk factors since their last session.     Patient reports there has been no change in protective factors since their last session.     Recommended that patient call 911 or go to the local ED should there be a change in any of these risk factors.     Appearance:   Appropriate    Eye Contact:   Good    Psychomotor Behavior: Normal    Attitude:   Cooperative    Orientation:   All   Speech    Rate / Production: Normal     Volume:  Normal    Mood:    Normal   Affect:    Appropriate    Thought Content:  Clear    Thought Form:  Coherent  Logical    Insight:    Good      Medication Review:   No changes to current psychiatric medication(s)     Medication Compliance:   Yes     Changes in Health Issues:   Yes.. blood pressure issues.      Chemical Use Review:   Substance Use: Chemical use reviewed, no active concerns identified      Tobacco Use: No current tobacco use.      Diagnoses:   296.31 (F33.0) Major Depressive Disorder, Recurrent Episode, Mild _ and With anxious distress        Collateral Reports Completed:    Collateral: [facility] electronic medical records reviewed.    PLAN: (Patient Tasks / Therapist Tasks / Other)  Provider will continue Diagnostic Assessment. Initial Treatment will focus on: Depressed Mood -  deep breathing techniques and strategies .  2.   Provider recommended the following referrals:         Scheduled to see a long-term therapist in 2024.    3.   Information in this assessment was obtained from         the medical record and provided by patient who is         a good historian.   4.    Follow up scheduled:   1 week        Patient was given the following to do until next         session:  medication techniques that were         reviewed during the session today seek to         understand rather than respond initially  5.   Anticipated Discharge: Anticipated Discharge         Time: 1 - 3 Months   6.   Practice clearly defining needs and wants.   7.   Listen to x-mass station while driving to decrease          irritability while driving.   Procedure Code: Psychotherapy (with patient) - 45 [CPT 11425]    SAURABH Herron  12/19/23                                                         _____

## 2023-12-28 NOTE — PROGRESS NOTES
Medication Therapy Management (MTM) Encounter    ASSESSMENT:                            Depression:   No changes recommended today, will give it more time to see if dose increase of mirtazapine and restarting Wellbutrin are helpful (change made 1-2 weeks ago). Given risk of hyponatremia with SSRIs, considered lamotrigine as a possible adjunctive med option in the future, however there are case reports of hyponatremia with lamotrigine as well. If additional therapy is needed, may need to consult with nephrology.     CKD stage 3: estimated GFR ranging 40-60ml/min.  Wellbutrin dosing: CrCl >60 no dose adjustment neccessary; CrCl 15-60 consider max dose of 150ml/min. Nephrology notes indicate tighter BP and BS control was discussed at last office visit (11/28/23). Writer did not discuss BP or DM with patient today, will address at MTM follow-up/CMR.     PLAN:                            No medication changes recommended at this time.     Follow-up: 1/11/24 Dr. Pinto psychiatrist, MTM 2-3 months    SUBJECTIVE/OBJECTIVE:                          Caio Morgan is a 63 year old male contacted via secure video for a follow-up visit from 7/6/23.       Reason for visit: medication check in.    Allergies/ADRs: Reviewed in chart  Past Medical History: Reviewed in chart  Tobacco: He reports that he has never smoked. He has never used smokeless tobacco.  Alcohol: not currently using    Medication Adherence/Access: no issues reported    Depression:   Current medications:   Wellbutrin XL 150mg daily  Mirtazapine 45mg nightly    Steve is seen at [facility] by Anant Pinto. Most recent visit was 11/16/23 at which time remeron was increased and Wellbutrin was restarted. Please see note by pt's provider for additional details regarding symptoms and history.     Psychiatric history: Patient was hospitalized in Dec 2022 for 2 weeks then transitional care unit for 2 weeks due to low sodium  and high potassium. Due to this his citalopram and Wellbutrin were discontinued . He has been working with nephrologist since. After stopping citalopram and Wellbutrin he reports worsening mood, irritability. PCP restarted Wellbutrin, but it hasn't been fully effective; max dose has been 300mg daily.    Today, patient reports he just made the above medication changes 1-2 weeks ago. Hasn't seen much change in symptoms. Still experiencing depressed mood, but overall some improvement since starting mirtazapine. Sleeping well. Ongoing symptoms of irritability, quick to temper. Working with a therapist for the last 2 months - has had 3 sessions. Will transition to long-term therapist in March due to waitlist.   Denies current side effects.           8/23/2023     2:13 PM 11/16/2023    10:08 AM 11/30/2023     1:00 PM   PHQ   PHQ-9 Total Score 10 8 8   Q9: Thoughts of better off dead/self-harm past 2 weeks Not at all Not at all Not at all         1/27/2023    10:03 AM 3/10/2023     1:48 PM 6/30/2023     8:21 AM   JOSE MANUEL-7 SCORE   Total Score 2 (minimal anxiety) 5 (mild anxiety) 3 (minimal anxiety)   Total Score 2 5 3     CKD stage 3:   Followed by outside nephrologist. On fluid restriction (50 ounces/day) for SIADH.   Last Comprehensive Metabolic Panel:  Lab Results   Component Value Date     12/14/2023    POTASSIUM 5.2 12/14/2023    CHLORIDE 102 12/14/2023    CO2 21 (L) 12/14/2023    ANIONGAP 12 12/14/2023     (H) 12/14/2023    BUN 28.5 (H) 12/14/2023    CR 1.67 (H) 12/14/2023    GFRESTIMATED 45 (L) 12/14/2023    CHAR 9.8 12/14/2023     BP Readings from Last 3 Encounters:   11/17/23 137/84   09/06/23 122/70   07/11/23 132/76       ----------------      I spent 15 minutes with this patient today. A copy of the visit note was provided to the patient's provider(s).    A summary of these recommendations was sent via [employer].    Haylie Grullon, PharmD, BCPP  Medication Therapy Management Pharmacist  [facility]    Telemedicine Visit Details  Type of service:  Video Conference via [employer]  Start Time:  1:06 PM  End Time: 1:22 PM     Medication Therapy Recommendations  No medication therapy recommendations to display

## 2023-12-29 ENCOUNTER — VIRTUAL VISIT (OUTPATIENT)
Dept: PHARMACY | Facility: CLINIC | Age: 63
End: 2023-12-29
Payer: COMMERCIAL

## 2023-12-29 DIAGNOSIS — N18.30 STAGE 3 CHRONIC KIDNEY DISEASE, UNSPECIFIED WHETHER STAGE 3A OR 3B CKD (H): ICD-10-CM

## 2023-12-29 DIAGNOSIS — F33.9 MDD (MAJOR DEPRESSIVE DISORDER), RECURRENT EPISODE (H): Primary | ICD-10-CM

## 2023-12-29 PROCEDURE — 99207 PR NO CHARGE LOS: CPT | Mod: VID | Performed by: PHARMACIST

## 2023-12-29 NOTE — Clinical Note
Hello! I met with patient for MTM follow-up with my return from maternity leave. He just increased remeron so too soon to see much additional benefit, but tolerating it well. Not sure where to go from here if not fully effective. I considered lamotrigine as an option especially with his irritability, but there are case reports of hyponatremia with it as well (although fewer than with other antiepileptics). You are scheduled with him again 1/11/24.   Thank you!  Haylie Grullon, PharmD, BCPP Medication Therapy Management Pharmacist [facility]

## 2023-12-29 NOTE — PATIENT INSTRUCTIONS
"Recommendations from today's MTM visit:                                                      No medication changes recommended at this time.     Follow-up: 1/11/24 Dr. Pinto psychiatrist, [facility] 2-3 months    It was great speaking with you today.  I value your experience and would be very thankful for your time in providing feedback in our clinic survey. In the next few days, you may receive an email or text message from [facility] [employer] with a link to a survey related to your  clinical pharmacist.\"     To schedule another MTM appointment, please call the clinic directly or you may call the MTM scheduling line at [phone] or toll-free at [phone].     My Clinical Pharmacist's contact information:                                                      Please feel free to contact me with any questions or concerns you have.      Haylie Grullon, PharmD, BCPP  Medication Therapy Management Pharmacist  [facility]    "

## 2024-01-03 ENCOUNTER — TELEPHONE (OUTPATIENT)
Dept: BEHAVIORAL HEALTH | Facility: CLINIC | Age: 64
End: 2024-01-03

## 2024-01-03 NOTE — TELEPHONE ENCOUNTER
Writer made reminder phone call to patient about their appointment tomorrow at 8:30 AM.  Patient indicated that they needed to reschedule.  Writer rescheduled patient for Wednesday, January 17 at 10:30 AM.

## 2024-01-08 ENCOUNTER — TELEPHONE (OUTPATIENT)
Dept: BEHAVIORAL HEALTH | Facility: CLINIC | Age: 64
End: 2024-01-08
Payer: MEDICARE

## 2024-01-11 ENCOUNTER — VIRTUAL VISIT (OUTPATIENT)
Dept: PSYCHIATRY | Facility: CLINIC | Age: 64
End: 2024-01-11
Payer: MEDICARE

## 2024-01-11 ENCOUNTER — VIRTUAL VISIT (OUTPATIENT)
Dept: BEHAVIORAL HEALTH | Facility: CLINIC | Age: 64
End: 2024-01-11
Payer: MEDICARE

## 2024-01-11 DIAGNOSIS — F33.0 MAJOR DEPRESSIVE DISORDER, RECURRENT EPISODE, MILD (H): Primary | ICD-10-CM

## 2024-01-11 DIAGNOSIS — F33.1 MAJOR DEPRESSIVE DISORDER, RECURRENT EPISODE, MODERATE (H): ICD-10-CM

## 2024-01-11 PROCEDURE — 99213 OFFICE O/P EST LOW 20 MIN: CPT | Mod: 95 | Performed by: PSYCHIATRY & NEUROLOGY

## 2024-01-11 RX ORDER — MIRTAZAPINE 45 MG/1
45 TABLET, FILM COATED ORAL AT BEDTIME
Qty: 30 TABLET | Refills: 1 | Status: SHIPPED | OUTPATIENT
Start: 2024-01-11 | End: 2024-02-27

## 2024-01-11 ASSESSMENT — PATIENT HEALTH QUESTIONNAIRE - PHQ9
10. IF YOU CHECKED OFF ANY PROBLEMS, HOW DIFFICULT HAVE THESE PROBLEMS MADE IT FOR YOU TO DO YOUR WORK, TAKE CARE OF THINGS AT HOME, OR GET ALONG WITH OTHER PEOPLE: SOMEWHAT DIFFICULT
SUM OF ALL RESPONSES TO PHQ QUESTIONS 1-9: 8
10. IF YOU CHECKED OFF ANY PROBLEMS, HOW DIFFICULT HAVE THESE PROBLEMS MADE IT FOR YOU TO DO YOUR WORK, TAKE CARE OF THINGS AT HOME, OR GET ALONG WITH OTHER PEOPLE: SOMEWHAT DIFFICULT
SUM OF ALL RESPONSES TO PHQ QUESTIONS 1-9: 8

## 2024-01-11 NOTE — PROGRESS NOTES
Virtual Visit Details    Type of service:  Video Visit   Video Start Time: 10:15 AM  Video End Time:10:28 AM    Originating Location (pt. Location): Home    Distant Location (provider location):  Off-site  Platform used for Video Visit: [facility] Consult Note    IDENTIFICATION   Name: Steve Morgan   : 1960/63 year old      Sex:    @ male          Telemedicine Visit: The patient's condition can be safely assessed and treated via synchronous audio and visual telemedicine encounter.        Face to Face/patient Contact total time: 13 minutes  Pre Charting time: 1 minutes; Post charting time, communication and other activities: 1 minutes; Total time 15 minutes            SUBJECTIVE   Mental health without significant changes. On higher dose of mirtazapine for 2-3 weeks with using up prior dose. No changes with bupropion. Irritability a problem - has had it for a very long time - years. He is wanting to address this with therapy.  Prefers to remain on current doses for a longer time and assess.  He is motivated to utilize therapy to help with irritability.      The following assessments were completed by patient for this visit:  PROMIS 10-Global Health (only subscores and total score):       10/27/2022    12:43 PM 5/15/2023    10:44 AM 2023     4:35 PM 2023     2:16 PM 2023    10:10 AM 2023     4:00 PM   PROMIS-10 Scores Only   Global Mental Health Score 12 9 10 11 9 8   Global Physical Health Score 12 12 13 15 12 12   PROMIS TOTAL - SUBSCORES 24 21 23 26 21 20             2023    10:08 AM 2023     1:00 PM 2024     9:12 AM   PHQ   PHQ-9 Total Score 8 8 8    8   Q9: Thoughts of better off dead/self-harm past 2 weeks Not at all Not at all Not at all          2023    10:03 AM 3/10/2023     1:48 PM 2023     8:21 AM   JOSE MANUEL-7 SCORE   Total Score 2 (minimal anxiety) 5 (mild anxiety) 3 (minimal anxiety)   Total Score 2 5 3        OBJECTIVE      Vital Signs:   There were no vitals taken for this visit.    Labs:  na    Current Medications:  Current Outpatient Medications   Medication    acetaminophen (TYLENOL) 325 MG tablet    ASPIRIN 81 MG OR TABS    atenolol (TENORMIN) 25 MG tablet    buPROPion (WELLBUTRIN XL) 150 MG 24 hr tablet    Calcium Carb-Cholecalciferol (CALCIUM 600 + D PO)    Continuous Blood Gluc  (FREESTYLE GIULIANA 2 READER) SIENA    Continuous Blood Gluc Sensor (FREESTYLE GIULIANA 2 SENSOR) MISC    Cyanocobalamin (VITAMIN B 12 PO)    famotidine (PEPCID) 40 MG tablet    ferrous sulfate 325 (65 FE) MG tablet    finasteride (PROSCAR) 5 MG tablet    glipiZIDE (GLUCOTROL XL) 10 MG 24 hr tablet    ibuprofen (ADVIL/MOTRIN) 200 MG tablet    insulin glargine (BASAGLAR KWIKPEN) 100 UNIT/ML pen    insulin pen needle (B-D U/F) 31G X 5 MM miscellaneous    latanoprost (XALATAN) 0.005 % ophthalmic solution    LEVOXYL 137 MCG tablet    losartan (COZAAR) 100 MG tablet    metFORMIN (GLUCOPHAGE) 1000 MG tablet    mirtazapine (REMERON) 45 MG tablet    MULTI-VITAMIN OR TABS    mupirocin (BACTROBAN) 2 % external ointment    pantoprazole (PROTONIX) 40 MG EC tablet    simvastatin (ZOCOR) 20 MG tablet    Urea (URE-NA) 15 g PACK packet    VITAMIN D, CHOLECALCIFEROL, PO     No current facility-administered medications for this visit.        The [place] Prescription Monitoring Program has been reviewed and there are no concerns about diversionary activity for controlled substances at this time.        ADDED HISTORY   na      MENTAL STATUS EXAMINATION:   Appearance: intact attention to grooming and hygiene  Attitude:  cooperative   Eye Contact:  good  Gait and Station: sitting  Psychomotor Behavior: Within normal limits  Oriented to:  Grossly person place and time  Attention Span and Concentration:  Grossly intact  Speech:  normal tone  Language: english  Mood: Fair  Affect: Mildly constricted  Associations:  no loose associations  Thought Process:  logical, linear  and goal oriented  Thought Content:  No evidence of delusions or suicidal or homicidal ideation plan or intent  Memory: grossly intact  Fund of Knowledge: Good  Insight:  good  Judgment:  intact, adequate for safety  Impulse Control:  intact        DIAGNOSES:   Major Depressive Disorder, moderate, recurrent  SIADH/hyponatremia      ASSESSMENT:   Patient with history of depression, and considerable mood difficulties without prior combo of citalopram/bupropion affecting relationships negatively. Has comorbid diagnoses including SIADH/hyponatermia (possibly worse with selective serotonin reuptake inhibitor) historically, DM, obstructive sleep apnea, b12 deficiency.     Per literature review mirtazapine is less likely to cause hyponatremia, and for current eGFR no dosage necessary. Will pursue.    Today Steve Morgan reports no suicidal ideations. In addition, he has notable risk factors for self-harm, including age and comorbid medical conditions. However, risk is mitigated by commitment to family, Rastafari beliefs and absence of past attempts. Therefore, based on all available evidence including the factors cited above, he does not appear to be at imminent risk for self-harm, does not meet criteria for a 72-hr hold, and therefore remains appropriate for ongoing outpatient level of care.       PLAN:     Patient advised of consultative model. Patient will continue to be seen for ongoing consultation and stabilization.  Does not meet criteria for involuntary treatment or hospitalization  Add mirtazapine 5/25/23 7.5 mg po at bedtime x 3 nights then 15 mg po at bedtime slight benefit for anger=> 7/27/23 30 mg at bedtime efficacy partially, difficulty with irritability => 11/16/23 45 gm po at bedtime -Risks, benefits and alternatives discussed.  Patient provides verbal consent to treatment.  restart bupropion 11/16/23 xl 150 mg daily-Risks, benefits and alternatives discussed.  Patient provides verbal consent to  treatment. With renal disease avoid 300 mg can consider 200 mg/24 h  DC'd citalopram (hyponatremia/SIADH)  Labs - reviewed; follow-up CMP in 10 weeks  Referred for MTM referral, priority on guidance for psychotropic selection and risk of hyponatremia  Therapy with JULIANE Sethla; switching to long term therapy with ISAIAH WatsonFabiola   Return in 4-10 weeks    Administrative Billing:   Time spent with patient was greater than 50% of time and/or significant time was spent in counseling and coordination of care regarding above diagnoses and treatment plan. Pre charting time and post charting time/documentation/coordination are done on date of service.      Signed:   Anant Pinto M.D.  [facility]    Disclaimer: This note consists of symbols derived from keyboarding, dictation and/or voice recognition software. As a result, there may be errors in the script that have gone undetected. Please consider this when interpreting information found in this chart.    eoc

## 2024-01-11 NOTE — PROGRESS NOTES
[facility]  1/11/2024      Behavioral Health Clinician Progress Note    Patient Name: Steve Morgan           Service Type:  Individual      Service Location:   [facility] / Email (patient reached)     Session Start Time: 937am  Session End Time: 1007am      Session Length: 16 - 37      Attendees: Patient     Service Modality:  Video Visit:      Provider verified identity through the following two step process.  Patient provided:  Patient photo and Patient was verified at admission/transfer    Telemedicine Visit: The patient's condition can be safely assessed and treated via synchronous audio and visual telemedicine encounter.      Reason for Telemedicine Visit: Services only offered telehealth    Originating Site (Patient Location): Patient's home    Distant Site (Provider Location): Provider Remote Setting- Home Office    Consent:  The patient/guardian has verbally consented to: the potential risks and benefits of telemedicine (video visit) versus in person care; bill my insurance or make self-payment for services provided; and responsibility for payment of non-covered services.     Patient would like the video invitation sent by:  My Chart    Mode of Communication:  Video Conference via [facility]    Distant Location (Provider):  Off-site    As the provider I attest to compliance with applicable laws and regulations related to telemedicine.    Visit Activities (Refresh list every visit): [facility] Only    Diagnostic Assessment Date: 12/7/2022 Veronica Chavis;  5/25/2023 Anant Pinto M.D.   Treatment Plan Review Date: 4/11/2024   See Flowsheets for today's PHQ-9 and JOSE MANUEL-7 results  Previous PHQ-9:       11/16/2023    10:08 AM 11/30/2023     1:00 PM 1/11/2024     9:12 AM   PHQ-9 SCORE   PHQ-9 Total Score [facility] 8 (Mild depression)  8 (Mild depression)   PHQ-9 Total Score 8 8 8    8     Previous JOSE MANUEL-7:       1/27/2023    10:03 AM 3/10/2023     1:48 PM 6/30/2023      8:21 AM   JOSE MANUEL-7 SCORE   Total Score 2 (minimal anxiety) 5 (mild anxiety) 3 (minimal anxiety)   Total Score 2 5 3   See JOSE MANUEL 2 below for current scores.     SHANNON LEVEL:      1/15/2020     8:18 PM 12/16/2022     2:26 PM   SHANNON Score (Last Two)   SHANNON Raw Score 36 34   Activation Score 75.5 68.9   SHANNON Level 4 3       DATA  Extended Session (60+ minutes): No  Interactive Complexity: No  Crisis: No  [facility] Patient: No    Treatment Objective(s) Addressed in This Session:  Target Behavior(s):  improve stress and anger/irritability    Depressed Mood: Decrease frequency and intensity of feeling down, depressed, hopeless  Improve quantity and quality of night time sleep / decrease daytime naps  Feel less tired and more energy during the day   Anger Management: will learn and practice positive anger management skills     Current Stressors / Issues:  Medication Questions/Requests:     update: Pt reports that things are the same. Irritability is the same and is pretty strong. He has a meeting with his therapist today and wants to talk about why these things bug him. Dr. Pinto increased mirtazapine and started Wellbutrin and pt hasn't seen much change. It's only been a couple of week and he knows it can take time to have an affect. Pt says that he has things to do around the house and he could care less about them and he will do them since they need to be done. Recently pt has picked up doing a lot more with genealogy. Pt has thought about volunteering and hasn't made strides towards this. He was volunteering a few years ago in [facility] and it was talking to others outside the family and meeting others gave him a sense of belonging. To volunteer before he drove by there. It is 30 miles from his house to go there. He has been working on his own genealogy. He has called [facility] and hasn't heard back. [facility] and pt discussed that stopping by may be a better way to reach them.     Therapist: meeting with therapist, haven't made a  lot of progress yet and is hopeful talking to people is a way to work himself out of it   Interventions: AIMS suleman- taking a deep breath, drinking a sip of water, visualizations of changing the response, relaxation       Progress on Treatment Objective(s) / Homework:  Minimal progress - ACTION (Actively working towards change); Intervened by reinforcing change plan / affirming steps taken    CBT: Pt is working there their therapist and they are working to understand more about their anger. Pt says that he is exploring volunteering which may help him get out ad be more social and feel better.     MI/CBT: [facility] explores with pt possible anger triggers and to use the AIMS suleman developed by [facility] to track triggers and use skills to help calm down. [facility] demonstrates parts of the suleman to pt. [facility] explains that relaxation practices that are daily or often can help reduce anger and frustration from building and to maybe use deep breathing or taking a drink of water as a way to center themselves before they respond. Visualizations of the situation and imagining that you are responding to the situation differently can also help change the automatic response.     Motivational Interviewing    MI Intervention: Co-Developed Goal: reduce anger and irritability and exploring volunteer opportunities, Expressed Empathy/Understanding, Supported Autonomy, Collaboration, Evocation, Permission to raise concern or advise, Open-ended questions, Reflections: simple and complex, Change talk (evoked), and Reframe     Change Talk Expressed by the Patient: Committment to change Activation Taking steps    Provider Response to Change Talk: E - Evoked more info from patient about behavior change, A - Affirmed patient's thoughts, decisions, or attempts at behavior change, R - Reflected patient's change talk, and S - Summarized patient's change talk statements    Also provided psychoeducation about behavioral health condition, symptoms, and treatment  options    Assessments completed prior to visit:  The following assessments were completed by patient for this visit:  PHQ9:       6/8/2023    12:46 PM 6/30/2023     8:20 AM 8/17/2023     7:46 AM 8/23/2023     2:13 PM 11/16/2023    10:08 AM 11/30/2023     1:00 PM 1/11/2024     9:12 AM   PHQ-9 SCORE   PHQ-9 Total Score MyChart 18 (Moderately severe depression) 7 (Mild depression) 14 (Moderate depression) 10 (Moderate depression) 8 (Mild depression)  8 (Mild depression)   PHQ-9 Total Score 18 7 14 10 8 8 8    8         Care Plan review completed: Yes    Medication Review:  No changes to current psychiatric medication(s)    Medication Compliance:  Yes    Changes in Health Issues:   None reported    Chemical Use Review:   Substance Use: Chemical use reviewed, no active concerns identified      Tobacco Use: No current tobacco use.      Assessment: Current Emotional / Mental Status (status of significant symptoms):  Risk status (Self / Other harm or suicidal ideation)  Patient denies a history of suicidal ideation, suicide attempts, self-injurious behavior, homicidal ideation, homicidal behavior, and and other safety concerns   Patient denies current fears or concerns for personal safety.  Patient denies current or recent suicidal ideation or behaviors.  Patient denies current or recent homicidal ideation or behaviors.  Patient denies current or recent self injurious behavior or ideation.  Patient denies other safety concerns.  A safety and risk management plan has not been developed at this time, however patient was encouraged to call [place] [phone] should there be a change in any of these risk factors.    Appearance:   Appropriate   Eye Contact:   Good   Psychomotor Behavior: Normal   Attitude:   Cooperative  Interested Friendly Pleasant  Orientation:   All  Speech   Rate / Production: Normal    Volume:  Normal   Mood:    Irritable  Sad   Affect:    Appropriate   Thought Content:  Clear   Thought Form:  Coherent   "Logical   Insight:    Good     Diagnoses:  1. Major depressive disorder, recurrent episode, mild (H24)        Collateral Reports Completed:  Communicated with: Anant Pinto M.D.     Plan: (Homework, other):  Patient was given information about behavioral services and encouraged to schedule a follow up appointment with the clinic [facility] in 1 month.  He was also given information about mental health symptoms and treatment options .  CD Recommendations: No indications of CD issues.     Catia An, [facility]    ______________________________________________________________________    Integrated Primary Care Behavioral Health Treatment Plan    Patient's Name: Steve Morgan  YOB: 1960    Date of Creation: 1/11/2024  Date Treatment Plan Last Reviewed/Revised: 1/11/2024    DSM5 Diagnoses:   1. Mild episode of recurrent major depressive disorder (H24)      Psychosocial / Contextual Factors: has wife, irritability/anger  PROMIS (reviewed every 90 days):   PROMIS 10-Global Health (only subscores and total score):       10/27/2022    12:43 PM 5/15/2023    10:44 AM 5/21/2023     4:35 PM 8/23/2023     2:16 PM 11/16/2023    10:10 AM 12/1/2023     4:00 PM   PROMIS-10 Scores Only   Global Mental Health Score 12 9 10 11 9    Global Physical Health Score 12 12 13 15 12    PROMIS TOTAL - SUBSCORES 24 21 23 26 21        Information is confidential and restricted. Go to Review Flowsheets to unlock data.       Referral / Collaboration:  Referral to another professional/service is not indicated at this time.    Anticipated number of session for this episode of care: 5-6  Anticipation frequency of session: Monthly  Anticipated Duration of each session: 16-37 minutes  Treatment plan will be reviewed in 90 days or when goals have been changed.       MeasurableTreatment Goal(s) related to diagnosis / functional impairment(s)  Goal 1: Patient will report improved irritability and depression     Pt will know he's met goal when \"I " "can figure out more about my anger.\"      Objective #A (Patient Action)                          Patient will work to find 1-2 ways to reduce anger/irritability.   Status: New - Date: 1/11/2024     Intervention(s)  Therapist provide support through CBT, MI, Acceptance and Commitment Therapy and problem solving model to explore and overcome barriers.      Patient has reviewed and agreed to the above plan.      Catia An, [facility]  January 11, 2024    "

## 2024-01-11 NOTE — NURSING NOTE
Is the patient currently in the state of [place]? YES    Visit mode:VIDEO    If the visit is dropped, the patient can be reconnected by: VIDEO VISIT: Text to cell phone:   Telephone Information:   Mobile [phone]       Will anyone else be joining the visit? NO  (If patient encounters technical issues they should call [phone]    How would you like to obtain your AVS? MyChart    Are changes needed to the allergy or medication list? Pt stated no changes to allergies and Pt stated no med changes    Reason for visit: BHUMI GORDILLO

## 2024-01-11 NOTE — PROGRESS NOTES
Answers submitted by the patient for this visit:  Patient Health Questionnaire (Submitted on 1/11/2024)  If you checked off any problems, how difficult have these problems made it for you to do your work, take care of things at home, or get along with other people?: Somewhat difficult  PHQ9 TOTAL SCORE: 8      Transition [facility] Progress Note    Patient Name:   Steve Morgan Date: January 11, 2024          Service Type: Individual      Session Start Time: 1430  Session End Time: 1522     Session Length:  TC Session Length: 45 (38-52 Minutes)    Session #: 4    Attendees: TC Attendees: Client alone    Service Modality:  Service Modality: Video Visit:      Provider verified identity through the following two step process.  Patient provided:  Patient is known previously to provider    Telemedicine Visit: The patient's condition can be safely assessed and treated via synchronous audio and visual telemedicine encounter.      Reason for Telemedicine Visit: Patient has requested telehealth visit    Originating Site (Patient Location): Patient's home    Distant Site (Provider Location): [facility]    Consent:  The patient/guardian has verbally consented to: the potential risks and benefits of telemedicine (video visit) versus in person care; bill my insurance or make self-payment for services provided; and responsibility for payment of non-covered services.     Patient would like the video invitation sent by:  Send to e-mail at: [email or web address]    Mode of Communication:  Video Conference via [employer]    Distant Location (Provider):  Off-site    As the provider I attest to compliance with applicable laws and regulations related to telemedicine.    Informed Consent and Assessment Methods    This provider and patient discussed HIPAA, and the limits of confidentiality; including mandated reporting, the possibility of collaborative discussions with patient's primary care provider and the multidisciplinary team in the  clinic  during consultation.  Discussed the no show policy, and the benefits and possible unintended consequences of therapy.  Patient indicated understanding Transition Clinic services are short term, solution focused, until a referral can be made and patient can bridge to long term therapy.  Patient verbally indicated understanding the informed consent.        DATA  Interactive Complexity: No  Crisis: No        Progress Since Last Session (Related to Symptoms / Goals / Homework):   Symptoms: Worsening upset regarding issues   related to son and concerns related to his wife.   Homework: Partially completed   Answers submitted by the patient for this visit:  Patient Health Questionnaire (Submitted on 1/11/2024)  If you checked off any problems, how difficult have these problems made it for you to do your work, take care of things at home, or get along with other people?: Somewhat difficult  PHQ9 TOTAL SCORE: 8     Episode of Care Goals: Minimal progress - PREPARATION (Decided to change - considering how); Intervened by negotiating a change plan and determining options / strategies for behavior change, identifying triggers, exploring social supports, and working towards setting a date to begin behavior change    Current / Ongoing Stressors and Concerns:  Communication issues between he and wife .     Treatment Objective(s) Addressed in This Session:Identify 3 stressors which contribute to feelings of anxiety      Intervention:  Solution Focused Brief Therapy:   Cognitive Behavioral Therapy (CBT) - Discussed changing thoughts is the path to changing behaviors and feelings. and Solution-Focused Brief Therapy (SFBT) - Change is perpetual, focus on the problematic excerptions. Reality is subjective and social constructed through conversation.    Assessments completed prior to visit:  The following assessments were completed by patient for this visit:  PHQ9:       6/8/2023    12:46 PM 6/30/2023     8:20 AM 8/17/2023     7:46 AM  8/23/2023     2:13 PM 11/16/2023    10:08 AM 11/30/2023     1:00 PM 1/11/2024     9:12 AM   PHQ-9 SCORE   PHQ-9 Total Score MyChart 18 (Moderately severe depression) 7 (Mild depression) 14 (Moderate depression) 10 (Moderate depression) 8 (Mild depression)  8 (Mild depression)   PHQ-9 Total Score 18 7 14 10 8 8 8    8     GAD7:       9/24/2021     7:01 AM 11/16/2021    10:11 AM 10/17/2022    10:38 AM 12/20/2022     9:43 AM 1/27/2023    10:03 AM 3/10/2023     1:48 PM 6/30/2023     8:21 AM   JOSE MANUEL-7 SCORE   Total Score 4 (minimal anxiety) 3 (minimal anxiety)  2 (minimal anxiety) 2 (minimal anxiety) 5 (mild anxiety) 3 (minimal anxiety)   Total Score 4 3 6 2 2 5 3     CAGE-AID:       10/14/2020    10:58 AM 12/1/2022    11:42 AM   CAGE-AID Total Score   Total Score 0 0   Total Score MyChart 0 (A total score of 2 or greater is considered clinically significant) 0 (A total score of 2 or greater is considered clinically significant)     PROMIS 10-Global Health (all questions and answers displayed):       10/27/2022    12:43 PM 5/15/2023    10:44 AM 5/21/2023     4:35 PM 8/23/2023     2:16 PM 11/16/2023    10:10 AM 12/1/2023     4:00 PM   PROMIS 10   In general, would you say your health is: Fair Fair Fair Fair Good    In general, would you say your quality of life is: Good Good Very good Good Good    In general, how would you rate your physical health? Poor Fair Poor Poor Fair    In general, how would you rate your mental health, including your mood and your ability to think? Good Fair Fair Fair Fair    In general, how would you rate your satisfaction with your social activities and relationships? Good Fair Fair Good Fair    In general, please rate how well you carry out your usual social activities and roles Fair Good Fair Fair Good    To what extent are you able to carry out your everyday physical activities such as walking, climbing stairs, carrying groceries, or moving a chair? Mostly Moderately Mostly Completely  Moderately    In the past 7 days, how often have you been bothered by emotional problems such as feeling anxious, depressed, or irritable? Sometimes Often Often Sometimes Often    In the past 7 days, how would you rate your fatigue on average? Moderate Moderate Moderate Mild Moderate    In the past 7 days, how would you rate your pain on average, where 0 means no pain, and 10 means worst imaginable pain? 2 1 0 0 1    In general, would you say your health is: 2 2 2 2 3 3   In general, would you say your quality of life is: 3 3 4 3 3 2   In general, how would you rate your physical health? 1 2 1 1 2 2   In general, how would you rate your mental health, including your mood and your ability to think? 3 2 2 2 2 2   In general, how would you rate your satisfaction with your social activities and relationships? 3 2 2 3 2 2   In general, please rate how well you carry out your usual social activities and roles. (This includes activities at home, at work and in your community, and responsibilities as a parent, child, spouse, employee, friend, etc.) 2 3 2 2 3 3   To what extent are you able to carry out your everyday physical activities such as walking, climbing stairs, carrying groceries, or moving a chair? 4 3 4 5 3 3   In the past 7 days, how often have you been bothered by emotional problems such as feeling anxious, depressed, or irritable? 3 4 4 3 4 4   In the past 7 days, how would you rate your fatigue on average? 3 3 3 2 3 3   In the past 7 days, how would you rate your pain on average, where 0 means no pain, and 10 means worst imaginable pain? 2 1 0 0 1 1   Global Mental Health Score 12 9 10 11 9 8   Global Physical Health Score 12 12 13 15 12 12   PROMIS TOTAL - SUBSCORES 24 21 23 26 21 20     [place] Suicide Severity Rating Scale (Lifetime/Recent)      10/14/2020     1:00 PM 10/17/2022    10:24 AM   [place] Suicide Severity Rating (Lifetime/Recent)   Q1 Wish to be Dead (Lifetime) No    RETIRED: 1. Wish to be  Dead (Recent) No    Q1 Wish to be Dead (Lifetime)  N   Q2 Non-Specific Active Suicidal Thoughts (Lifetime)  N   Actual Attempt (Lifetime)  N   Has subject engaged in non-suicidal self-injurious behavior? (Lifetime)  N   Interrupted Attempts (Lifetime)  N   Aborted or Self-Interrupted Attempt (Lifetime)  N   Preparatory Acts or Behavior (Lifetime)  N   Calculated C-SSRS Risk Score (Lifetime/Recent)  No Risk Indicated         ASSESSMENT: Current Emotional / Mental Status (status of significant symptoms):   Risk status (Self / Other harm or suicidal ideation)   Patient denies current fears or concerns for personal safety.   Patient denies current or recent suicidal ideation or behaviors.   Patient denies current or recent homicidal ideation or behaviors.   Patient denies current or recent self injurious behavior or ideation.   Patient denies other safety concerns.   Patient reports there has been no change in risk factors since their last session.     Patient reports there has been no change in protective factors since their last session.     Recommended that patient call 911 or go to the local ED should there be a change in any of these risk factors.     Appearance:   Appropriate    Eye Contact:   Good    Psychomotor Behavior: Normal    Attitude:   Cooperative    Orientation:   All   Speech    Rate / Production: Normal     Volume:  Normal    Mood:    Normal   Affect:    Appropriate    Thought Content:  Clear    Thought Form:  Coherent  Logical    Insight:    Good      Medication Review:   No current psychiatric medications prescribed     Medication Compliance:   Yes     Changes in Health Issues:   None reported     Chemical Use Review:   Substance Use: Chemical use reviewed, no active concerns identified      Tobacco Use: No current tobacco use.      Diagnoses:   296.31 (F33.0) Major Depressive Disorder, Recurrent Episode, Mild _ and With anxious distress      Patient Health Questionnaire (Submitted on 1/11/2024)  If  you checked off any problems, how difficult have these problems made it for you to do your work, take care of things at home, or get along with other people?: Somewhat difficult  PHQ9 TOTAL SCORE: 8      Collateral Reports Completed:   ANUJ Collateral: [facility] electronic medical records reviewed.    PLAN: (Patient Tasks / Therapist Tasks / Other)  Provider will continue Diagnostic Assessment. Initial Treatment will focus on: Depressed Mood -  deep breathing techniques and strategies .  2.   Provider recommended the following referrals:         Scheduled to see a long-term therapist in 2024.    3.   Information in this assessment was obtained from         the medical record and provided by patient who is         a good historian.   4.   Follow up scheduled:   1 week        Patient was given the following to do until next         session:  medication techniques that were         reviewed during the session today seek to         understand rather than respond initially  5.   Anticipated Discharge: Anticipated Discharge         Time: 1 - 3 Months   6.   Practice clearly defining needs and wants.   7.   Begin exercise regime.Answers submitted by the patient for this visit:  Patient Health Questionnaire (Submitted on 1/11/2024)  If you checked off any problems, how difficult have these problems made it for you to do your work, take care of things at home, or get along with other people?: Somewhat difficult  PHQ9 TOTAL SCORE: 8      Procedure Code: Psychotherapy (with patient) - 45 [CPT 35364]     SAURABH Herron     1/11/24       Patient Health Questionnaire (Submitted on 1/11/2024)  If you checked off any problems, how difficult have these problems made it for you to do your work, take care of things at home, or get along with other people?: Somewhat difficult  PHQ9 TOTAL SCORE: 8

## 2024-01-11 NOTE — PATIENT INSTRUCTIONS
AIMS suleman- taking a deep breath, drinking a sip of water, visualizations of changing the response, relaxation

## 2024-01-17 ENCOUNTER — TELEPHONE (OUTPATIENT)
Dept: BEHAVIORAL HEALTH | Facility: CLINIC | Age: 64
End: 2024-01-17
Payer: MEDICARE

## 2024-01-17 NOTE — TELEPHONE ENCOUNTER
Writer spoke with patient on que and rescheduled appointment for today to 01/25/2024 @ 1:30 pm.    Carri Wade  01/17/2024  [phone]

## 2024-01-31 ENCOUNTER — VIRTUAL VISIT (OUTPATIENT)
Dept: BEHAVIORAL HEALTH | Facility: CLINIC | Age: 64
End: 2024-01-31
Payer: MEDICARE

## 2024-01-31 DIAGNOSIS — F33.0 MAJOR DEPRESSIVE DISORDER, RECURRENT EPISODE, MILD (H): Primary | ICD-10-CM

## 2024-01-31 NOTE — PROGRESS NOTES
"[facility]      PATIENT'S NAME: Steve Morgan  PREFERRED NAME: Caio  PRONOUNS: Rigoberto martinez   MRN: [id number]  : 1960  ADDRESS: [address]  [facility] [address]  ACCT. NUMBER:  [id number]  DATE OF SERVICE: 24  START TIME: 1430  END TIME: 1530  PREFERRED PHONE: [phone]  May we leave a program related message: Yes  EMERGENCY CONTACT: was not obtained.  SERVICE MODALITY:  Video Visit:      Provider verified identity through the following two step process.  Patient provided:  Patient is known previously to provider    Telemedicine Visit: The patient's condition can be safely assessed and treated via synchronous audio and visual telemedicine encounter.      Reason for Telemedicine Visit: Patient has requested telehealth visit    Originating Site (Patient Location): Patient's home    Distant Site (Provider Location): Provider Remote Setting- Home Office    Consent:  The patient/guardian has verbally consented to: the potential risks and benefits of telemedicine (video visit) versus in person care; bill my insurance or make self-payment for services provided; and responsibility for payment of non-covered services.     Patient would like the video invitation sent by:  Send to e-mail at: [email or web address]    Mode of Communication:  Video Conference via [employer]    Distant Location (Provider):  Off-site    As the provider I attest to compliance with applicable laws and regulations related to telemedicine.    UNIVERSAL ADULT Mental Health DIAGNOSTIC ASSESSMENT    Identifying Information:  Patient is a 63 year old,   individual.  Patient was referred for an assessment by [facility].  Patient attended the session alone.    Chief Complaint:   The reason for seeking services at this time is: \"Need to determine what to do about my depression now that they took away part of my medication\".  The problem(s) began 01/15/23.    Patient has not attempted to resolve " these concerns in the past.    Social/Family History:  Patient reported they grew up in [place], [place].  They were raised by biological parents  .  Parents were always together.  Patient reported that their childhood was good.  Patient described their current relationships with family of origin as  good.    The patient describes their cultural background as .  Cultural influences and impact on patient's life structure, values, norms, and healthcare: [employer], my heritage is Brazilian, Bohemian and [place] with some minor other heritages, Welived in town all our lives.  Contextual influences on patient's health include: Contextual Factors: Family Factors   .    These factors will be addressed in the Preliminary Treatment plan. Patient identified their preferred language to be English. Patient reported they does not need the assistance of an  or other support involved in therapy.     Patient reported had no significant delays in developmental tasks.   Patient's highest education level was associate degree / vocational certificate  .  Patient identified the following learning problems: none reported.  Modifications will not be used to assist communication in therapy.  Patient reports they are  able to understand written materials.    Patient reported the following relationship history.  Patient's current relationship status is  for 25yrs.   Patient identified their sexual orientation as heterosexual.  Patient reported having 2 child(roverto). Patient identified partner; mother; adult child as part of their support system.  Patient identified the quality of these relationships as good,  .      Patient's current living/housing situation involves staying in own home/apartment.  The immediate members of family and household include Tessy Morgan, 63,Spouse  and they report that housing is stable.    Patient is currently disabled.  Patient reports their finances are obtained through  family; disability. Patient does identify finances as a current stressor.      Patient reported that they have not been involved with the legal system. Patient does not report being under probation/ parole/ jurisdiction.     Patient's Strengths and Limitations:  Patient identified the following strengths or resources that will help them succeed in treatment: Jehovah's witness / Jainism, family support, insight, and motivation. Things that may interfere with the patient's success in treatment include:  social isolation .     Assessments:  The following assessments were completed by patient for this visit:  PHQ9:       6/8/2023    12:46 PM 6/30/2023     8:20 AM 8/17/2023     7:46 AM 8/23/2023     2:13 PM 11/16/2023    10:08 AM 11/30/2023     1:00 PM 1/11/2024     9:12 AM   PHQ-9 SCORE   PHQ-9 Total Score MyChart 18 (Moderately severe depression) 7 (Mild depression) 14 (Moderate depression) 10 (Moderate depression) 8 (Mild depression)  8 (Mild depression)   PHQ-9 Total Score 18 7 14 10 8 8 8    8     GAD7:       9/24/2021     7:01 AM 11/16/2021    10:11 AM 10/17/2022    10:38 AM 12/20/2022     9:43 AM 1/27/2023    10:03 AM 3/10/2023     1:48 PM 6/30/2023     8:21 AM   JOSE MANUEL-7 SCORE   Total Score 4 (minimal anxiety) 3 (minimal anxiety)  2 (minimal anxiety) 2 (minimal anxiety) 5 (mild anxiety) 3 (minimal anxiety)   Total Score 4 3 6 2 2 5 3     CAGE-AID:       10/14/2020    10:58 AM 12/1/2022    11:42 AM   CAGE-AID Total Score   Total Score 0 0   Total Score MyChart 0 (A total score of 2 or greater is considered clinically significant) 0 (A total score of 2 or greater is considered clinically significant)     PROMIS 10-Global Health (all questions and answers displayed):       10/27/2022    12:43 PM 5/15/2023    10:44 AM 5/21/2023     4:35 PM 8/23/2023     2:16 PM 11/16/2023    10:10 AM 12/1/2023     4:00 PM   PROMIS 10   In general, would you say your health is: Fair Fair Fair Fair Good    In general, would you say your quality of  life is: Good Good Very good Good Good    In general, how would you rate your physical health? Poor Fair Poor Poor Fair    In general, how would you rate your mental health, including your mood and your ability to think? Good Fair Fair Fair Fair    In general, how would you rate your satisfaction with your social activities and relationships? Good Fair Fair Good Fair    In general, please rate how well you carry out your usual social activities and roles Fair Good Fair Fair Good    To what extent are you able to carry out your everyday physical activities such as walking, climbing stairs, carrying groceries, or moving a chair? Mostly Moderately Mostly Completely Moderately    In the past 7 days, how often have you been bothered by emotional problems such as feeling anxious, depressed, or irritable? Sometimes Often Often Sometimes Often    In the past 7 days, how would you rate your fatigue on average? Moderate Moderate Moderate Mild Moderate    In the past 7 days, how would you rate your pain on average, where 0 means no pain, and 10 means worst imaginable pain? 2 1 0 0 1    In general, would you say your health is: 2 2 2 2 3 3   In general, would you say your quality of life is: 3 3 4 3 3 2   In general, how would you rate your physical health? 1 2 1 1 2 2   In general, how would you rate your mental health, including your mood and your ability to think? 3 2 2 2 2 2   In general, how would you rate your satisfaction with your social activities and relationships? 3 2 2 3 2 2   In general, please rate how well you carry out your usual social activities and roles. (This includes activities at home, at work and in your community, and responsibilities as a parent, child, spouse, employee, friend, etc.) 2 3 2 2 3 3   To what extent are you able to carry out your everyday physical activities such as walking, climbing stairs, carrying groceries, or moving a chair? 4 3 4 5 3 3   In the past 7 days, how often have you been  bothered by emotional problems such as feeling anxious, depressed, or irritable? 3 4 4 3 4 4   In the past 7 days, how would you rate your fatigue on average? 3 3 3 2 3 3   In the past 7 days, how would you rate your pain on average, where 0 means no pain, and 10 means worst imaginable pain? 2 1 0 0 1 1   Global Mental Health Score 12 9 10 11 9 8   Global Physical Health Score 12 12 13 15 12 12   PROMIS TOTAL - SUBSCORES 24 21 23 26 21 20     [place] Suicide Severity Rating Scale (Lifetime/Recent)      10/14/2020     1:00 PM 10/17/2022    10:24 AM   [place] Suicide Severity Rating (Lifetime/Recent)   Q1 Wish to be Dead (Lifetime) No    RETIRED: 1. Wish to be Dead (Recent) No    Q1 Wish to be Dead (Lifetime)  N   Q2 Non-Specific Active Suicidal Thoughts (Lifetime)  N   Actual Attempt (Lifetime)  N   Has subject engaged in non-suicidal self-injurious behavior? (Lifetime)  N   Interrupted Attempts (Lifetime)  N   Aborted or Self-Interrupted Attempt (Lifetime)  N   Preparatory Acts or Behavior (Lifetime)  N   Calculated C-SSRS Risk Score (Lifetime/Recent)  No Risk Indicated       Personal and Family Medical History:  Patient does not report a family history of mental health concerns.  Patient reports family history includes Alzheimer Disease (age of onset: 82) in his mother; Asthma in his brother; Breast Cancer in his mother; Cancer in his paternal grandfather; Cancer - colorectal in his father; Circulatory in his paternal grandmother; Depression in his father and mother; Diabetes in his brother and brother; Heart Disease in his maternal grandmother; Hypertension in his mother; Other - See Comments in his father; Thyroid Disease in his father and mother..     Patient does report Mental Health Diagnosis and/or Treatment.  Patient reported the following previous diagnoses which include(s):   Patient reported symptoms began years.  Patient has not received mental health services in the past: Psychiatric Hospitalizations:  None      Currently, patient is not receiving other mental health services. Awaiting long-term Therapist.       Patient has had a physical exam to rule out medical causes for current symptoms.  Date of last physical exam was recently.  The patient has a [facility] Primary Care Provider, who is named Lisa Pierre.  Patient reports  recent leg injury .  Patient reports pain concerns including leg pain.  Patient does not want help addressing pain concerns..   There are not significant appetite / nutritional concerns / weight changes.   Patient does not report a history of head injury / trauma / cognitive impairment.    Current Outpatient Medications   Medication    acetaminophen (TYLENOL) 325 MG tablet    ASPIRIN 81 MG OR TABS    atenolol (TENORMIN) 25 MG tablet    buPROPion (WELLBUTRIN XL) 150 MG 24 hr tablet    Calcium Carb-Cholecalciferol (CALCIUM 600 + D PO)    Continuous Blood Gluc  (FREESTYLE GIULIANA 2 READER) SIENA    Continuous Blood Gluc Sensor (FREESTYLE GIULIANA 2 SENSOR) MISC    Cyanocobalamin (VITAMIN B 12 PO)    famotidine (PEPCID) 40 MG tablet    ferrous sulfate 325 (65 FE) MG tablet    finasteride (PROSCAR) 5 MG tablet    glipiZIDE (GLUCOTROL XL) 10 MG 24 hr tablet    ibuprofen (ADVIL/MOTRIN) 200 MG tablet    insulin glargine (BASAGLAR KWIKPEN) 100 UNIT/ML pen    insulin pen needle (B-D U/F) 31G X 5 MM miscellaneous    latanoprost (XALATAN) 0.005 % ophthalmic solution    LEVOXYL 137 MCG tablet    losartan (COZAAR) 100 MG tablet    metFORMIN (GLUCOPHAGE) 1000 MG tablet    mirtazapine (REMERON) 45 MG tablet    MULTI-VITAMIN OR TABS    mupirocin (BACTROBAN) 2 % external ointment    pantoprazole (PROTONIX) 40 MG EC tablet    simvastatin (ZOCOR) 20 MG tablet    Urea (URE-NA) 15 g PACK packet    VITAMIN D, CHOLECALCIFEROL, PO     No current facility-administered medications for this visit.       Patient reports current meds as:   No outpatient medications have been marked as taking for the 1/31/24  encounter (Appointment) with Alisa Asif LICSW.       Medication Adherence:  Patient reports taking.  taking prescribed medications as prescribed.    Patient Allergies:  No Known Allergies    Medical History:    Past Medical History:   Diagnosis Date    Cellulitis and abscess of trunk 6/27/2017    Depression     Hyperlipidemia LDL goal <100 10/31/2010    Hypertension goal BP (blood pressure) < 140/90 3/17/2011    Hypothyroidism 1/12/2010    Morbid obesity due to excess calories (H) 1/12/2010    Neuropathy in diabetes (H) 1/12/2010    Obesity 1/12/2010    Sleep apnea 1/12/2010    CPAP    Type 2 diabetes, HbA1C goal < 8% (H) 3/8/2011         Current Mental Status Exam:   Appearance:  Appropriate    Eye Contact:  Good   Psychomotor:  Normal       Gait / station:  no problem  Attitude / Demeanor: Cooperative   Speech      Rate / Production: Normal/ Responsive      Volume:  Normal  volume      Language:  intact  Mood:   Normal  Affect:   Appropriate    Thought Content: Clear   Thought Process: Coherent       Associations: No loosening of associations  Insight:   Good   Judgment:  Intact   Orientation:  All  Attention/concentration: Good    Substance Use:   No Issues.     Significant Losses / Trauma / Abuse / Neglect Issues:   Patient did not  serve in the .  There are indications or report of significant loss, trauma, abuse or neglect issues related to: are no indications and client denies any losses, trauma, abuse, or neglect concerns.  Concerns for possible neglect are not present.   Safety Assessment:   Patient denies current homicidal ideation and behaviors.  Patient denies current self-injurious ideation and behaviors.    Patient denied risk behaviors associated with substance use.   Patient denies any high risk behaviors associated with mental health symptoms.  Patient reports the following current concerns for their personal safety: None.  Patient reports there are firearms in the house.     yes,  they are secured. There are no firearms in the home..    History of Safety Concerns:  Patient denied a history of homicidal ideation.     Patient denied a history of personal safety concerns.    Patient denied a history of assaultive behaviors.    Patient denied a history of sexual assault behaviors.     Patient denied a history of risk behaviors associated with substance use.  Patient denies any history of high risk behaviors associated with mental health symptoms.  Patient reports the following protective factors: forward or future oriented thinking; dedication to family or friends; safe and stable environment; regular sleep; effectively controls impulses; sense of belonging; help seeking behaviors when distressed; abstinence from substances; adherence with prescribed medication; agreement to use safety plan; living with other people; daily obligations; effective problem solving skills; commitment to well being; sense of meaning; positive social skills; healthy fear of risky behaviors or pain; financial stability; strong sense of self worth or esteem; sense of personal control or determination; access to a variety of clinical interventions and pets    Risk Plan:  See Recommendations for Safety and Risk Management Plan    Review of Symptoms per patient report:   Depression: No symptoms, Change in sleep, Change in energy level, Difficulties concentrating, Feelings of hopelessness, Irritability, and Feeling sad, down, or depressed  Marianne:  No Symptoms  Psychosis: No Symptoms  Anxiety: Excessive worry, Nervousness, Social anxiety, and Poor concentration  Panic:  No symptoms  Post Traumatic Stress Disorder:  No Symptoms   Eating Disorder: No Symptoms  ADD / ADHD:  No symptoms  Conduct Disorder: No symptoms  Autism Spectrum Disorder: No symptoms  Obsessive Compulsive Disorder: No Symptoms    Patient reports the following compulsive behaviors and treatment history: No past or current     Diagnostic Criteria:    296.31  (F33.0) Major Depressive Disorder, Recurrent Episode, Mild- and With anxious distress     Functional Status:  Patient reports the following functional impairments:  home life with wife, relationship(s), and social interactions.     Nonprogrammatic care:  Patient is requesting basic services to address current mental health concerns.    Clinical Summary:  1. Psychosocial, Cultural and Contextual Factors: stressors at home.  2. Principal DSM5 Diagnoses  (Sustained by DSM5 Criteria Listed Above):       296.31 (F33.0) Major Depressive Disorder, Recurrent Episode, Mild- and With anxious distress   3. Other Diagnoses that is relevant to services:   none  4. Provisional Diagnosis:  none  5. Prognosis: Expect Improvement.  6. Likely consequences of symptoms if not treated: symptoms will worsen if not addresses.  7. Client strengths include:  caring, goal-focused, good listener, insightful, intelligent, motivated, wants to learn, and willing to ask questions .     Recommendations:     1. Plan for Safety and Risk Management:   Safety and Risk: Recommended that patient call 911 or go to the local ED should there be a change in any of these risk factors..          Report to child / adult protection services was NA.     2. Patient's identified  communications issues .     3. Initial Treatment will focus on:    Depressed Mood -    Anxiety -    Relational Problems related to: Conflict or difficulties with partner/spouse and Conflict or difficulties with and with son.     4. Resources/Service Plan:    services are not indicated.   Modifications to assist communication are not indicated.   Additional disability accommodations are not indicated.      5. Collaboration:   Collaboration / coordination of treatment will be initiated with the following  support professionals:  not needed .      6.  Referrals:   The following referral(s) will be initiated: Outpatient [facility].       A Release of Information has been  obtained for the following:  not required .    Clinical Substantiation/medical necessity for the above recommendations:  Patient will benefit from therapy to reduce the chronicity and intensity of symptoms which have impacted daily functioning. Need to determine what to do about my depression now that they took away part of my medication. Recommend individual Therapy in order to obtain tools, techniques to improve communication and mood.    7. BELINDA: No issues indicated    8. Records:   These were reviewed at time of assessment.   Information in this assessment was obtained from the medical record and  provided by patient who is a good historian.    Patient will have open access to their mental health medical record.    9.   Interactive Complexity: No    10. Safety Plan:  Patient denied any current/recent/lifetime history of suicidal ideation and/or behaviors.  No safety plan indicated at this time.     Provider Name/ Credentials: Margaret WILEY  January 31, 2024

## 2024-02-01 ENCOUNTER — APPOINTMENT (OUTPATIENT)
Dept: GENERAL RADIOLOGY | Facility: CLINIC | Age: 64
End: 2024-02-01
Attending: EMERGENCY MEDICINE
Payer: MEDICARE

## 2024-02-01 ENCOUNTER — HOSPITAL ENCOUNTER (EMERGENCY)
Facility: CLINIC | Age: 64
Discharge: HOME | End: 2024-02-01
Attending: EMERGENCY MEDICINE | Admitting: EMERGENCY MEDICINE
Payer: MEDICARE

## 2024-02-01 VITALS
TEMPERATURE: 97.5 F | SYSTOLIC BLOOD PRESSURE: 136 MMHG | DIASTOLIC BLOOD PRESSURE: 71 MMHG | OXYGEN SATURATION: 100 % | RESPIRATION RATE: 20 BRPM | HEART RATE: 68 BPM

## 2024-02-01 DIAGNOSIS — M79.604 RIGHT LEG PAIN: ICD-10-CM

## 2024-02-01 PROCEDURE — 72170 X-RAY EXAM OF PELVIS: CPT

## 2024-02-01 PROCEDURE — 73552 X-RAY EXAM OF FEMUR 2/>: CPT | Mod: RT

## 2024-02-01 PROCEDURE — 99284 EMERGENCY DEPT VISIT MOD MDM: CPT

## 2024-02-01 PROCEDURE — 250N000011 HC RX IP 250 OP 636: Performed by: EMERGENCY MEDICINE

## 2024-02-01 PROCEDURE — 250N000013 HC RX MED GY IP 250 OP 250 PS 637: Performed by: EMERGENCY MEDICINE

## 2024-02-01 RX ORDER — GABAPENTIN 300 MG/1
300 CAPSULE ORAL 3 TIMES DAILY
Qty: 15 CAPSULE | Refills: 0 | Status: SHIPPED | OUTPATIENT
Start: 2024-02-01 | End: 2024-02-06

## 2024-02-01 RX ORDER — ACETAMINOPHEN 500 MG
1000 TABLET ORAL ONCE
Status: COMPLETED | OUTPATIENT
Start: 2024-02-01 | End: 2024-02-01

## 2024-02-01 RX ORDER — ONDANSETRON 4 MG/1
4 TABLET, ORALLY DISINTEGRATING ORAL ONCE
Status: COMPLETED | OUTPATIENT
Start: 2024-02-01 | End: 2024-02-01

## 2024-02-01 RX ORDER — GABAPENTIN 300 MG/1
300 CAPSULE ORAL ONCE
Status: COMPLETED | OUTPATIENT
Start: 2024-02-01 | End: 2024-02-01

## 2024-02-01 RX ORDER — METHOCARBAMOL 500 MG/1
500 TABLET, FILM COATED ORAL 4 TIMES DAILY PRN
Qty: 15 TABLET | Refills: 0 | Status: ON HOLD | OUTPATIENT
Start: 2024-02-01 | End: 2024-05-18

## 2024-02-01 RX ORDER — OXYCODONE HYDROCHLORIDE 5 MG/1
5 TABLET ORAL ONCE
Status: COMPLETED | OUTPATIENT
Start: 2024-02-01 | End: 2024-02-01

## 2024-02-01 RX ORDER — METHOCARBAMOL 750 MG/1
750 TABLET, FILM COATED ORAL ONCE
Status: COMPLETED | OUTPATIENT
Start: 2024-02-01 | End: 2024-02-01

## 2024-02-01 RX ADMIN — METHOCARBAMOL 750 MG: 750 TABLET ORAL at 12:43

## 2024-02-01 RX ADMIN — ONDANSETRON 4 MG: 4 TABLET, ORALLY DISINTEGRATING ORAL at 12:43

## 2024-02-01 RX ADMIN — GABAPENTIN 300 MG: 300 CAPSULE ORAL at 15:06

## 2024-02-01 RX ADMIN — ACETAMINOPHEN 1000 MG: 500 TABLET ORAL at 12:43

## 2024-02-01 RX ADMIN — OXYCODONE HYDROCHLORIDE 5 MG: 5 TABLET ORAL at 12:43

## 2024-02-01 ASSESSMENT — ACTIVITIES OF DAILY LIVING (ADL)
ADLS_ACUITY_SCORE: 35
ADLS_ACUITY_SCORE: 35

## 2024-02-01 NOTE — ED NOTES
Pt up to restroom with use of walker able to ambulate pt remains unsteady but completed with standby assist only. Pt reports pain has improved and has f/unit(s) appt with ortho next week. Educated on meds and discharge plans.

## 2024-02-01 NOTE — DISCHARGE INSTRUCTIONS
Please return to the emergency department if your symptoms increase, you experience numbness or weakness in your leg, numbness in your groin, loss of bowel or bladder control.    You can use a foam roller to help massage her leg.  You can use ice and heat for comfort.    You can use gabapentin and muscle relaxer for pain.  These will make you drowsy and sleepy.  Please do not drive or get up and walk around while taking these.  You can use Tylenol and Motrin for pain.    Please use your ankle braces as well as your walker.

## 2024-02-01 NOTE — ED PROVIDER NOTES
History     Chief Complaint:  Fall    HPI   Steve Morgan is a 63 year old male with history of type 2 diabetes who presents to the ED for evaluation of injury from fall. The patient's wife reports the patient slipped and fell on ice last week, and was evaluated at Urgent Care with reassuring imaging. He was prescribed Lidocaine patches and Oxycodone for pain but presents today with continued pain to the back of his right thigh and several recent falls over the last week. He has not had bruising to his leg or back pain. He denies head injury or loss of consciousness with his falls.    Independent Historian:   None - Patient Only    Review of External Notes:   Reviewed patient's urgent care visit from 1/24/2024.  Patient was diagnosed with hamstring strain on the right.  Femur x-rays were negative.  Patient was given a follow-up with orthopedics.  Prescribed oxycodone, lidocaine patch, and Tylenol.      Medications:    acetaminophen (TYLENOL) 325 MG tablet  ASPIRIN 81 MG OR TABS  atenolol (TENORMIN) 25 MG tablet  buPROPion (WELLBUTRIN XL) 150 MG 24 hr tablet  Calcium Carb-Cholecalciferol (CALCIUM 600 + D PO)  Continuous Blood Gluc  (FREESTYLE GIULIANA 2 READER) SIENA  Continuous Blood Gluc Sensor (FREESTYLE GIULIANA 2 SENSOR) MISC  Cyanocobalamin (VITAMIN B 12 PO)  famotidine (PEPCID) 40 MG tablet  ferrous sulfate 325 (65 FE) MG tablet  finasteride (PROSCAR) 5 MG tablet  glipiZIDE (GLUCOTROL XL) 10 MG 24 hr tablet  ibuprofen (ADVIL/MOTRIN) 200 MG tablet  insulin glargine (BASAGLAR KWIKPEN) 100 UNIT/ML pen  insulin pen needle (B-D U/F) 31G X 5 MM miscellaneous  latanoprost (XALATAN) 0.005 % ophthalmic solution  LEVOXYL 137 MCG tablet  losartan (COZAAR) 100 MG tablet  metFORMIN (GLUCOPHAGE) 1000 MG tablet  mirtazapine (REMERON) 45 MG tablet  MULTI-VITAMIN OR TABS  mupirocin (BACTROBAN) 2 % external ointment  pantoprazole (PROTONIX) 40 MG EC tablet  simvastatin (ZOCOR) 20 MG tablet  Urea (URE-NA) 15 g PACK  packet  VITAMIN D, CHOLECALCIFEROL, PO    Past Medical History:    Past Medical History:   Diagnosis Date    Cellulitis and abscess of trunk 6/27/2017    Depression     Hyperlipidemia LDL goal <100 10/31/2010    Hypertension goal BP (blood pressure) < 140/90 3/17/2011    Hypothyroidism 1/12/2010    Morbid obesity due to excess calories (H) 1/12/2010    Neuropathy in diabetes (H) 1/12/2010    Obesity 1/12/2010    Sleep apnea 1/12/2010    Type 2 diabetes, HbA1C goal < 8% (H) 3/8/2011     Past Surgical History:    Past Surgical History:   Procedure Laterality Date    BIOPSY      COLONOSCOPY      EYE SURGERY      GENITOURINARY SURGERY      surg for undescended testicle    IRRIGATION AND DEBRIDEMENT HAND, COMBINED Right 12/23/2022    Procedure: Right long finger irrigation and debridement with partial amputation of the right long finger. ;  Surgeon: Colby English MD;  Location:  OR    REPAIR HAMMER TOE BILATERAL  5/16/2013    Procedure: REPAIR HAMMER TOE BILATERAL;  Flexor Tenotomy Toes 2,3,4,5 Bilateral Feet;  Surgeon: Saad Bangura DPM;  Location:  OR        Physical Exam   Patient Vitals for the past 24 hrs:   BP Temp Temp src Pulse Resp SpO2   02/01/24 1142 (!) 148/100 97.5  F (36.4  C) Temporal 116 22 98 %   02/01/24 1140 -- 97.7  F (36.5  C) Temporal -- 16 98 %      Physical Exam  General: Well-nourished, resting comfortably when I enter the room  Eyes: Pupils equal, conjunctivae pink no scleral icterus or conjunctival injection  ENT:  Moist mucus membranes  Respiratory:  Lungs clear to auscultation bilaterally, no crackles/rubs/wheezes.  Good air movement  CV: Normal rate and rhythm, no murmurs  GI:  Abdomen soft and non-distended.  No tenderness, guarding or rebound  Skin: Warm, dry.  No rashes or petechiae  Musculoskeletal: Tenderness to palpation on the back of the right thigh.  Muscle feels very tight.  No signs of trauma including lesions, abrasions, lacerations.  No tenderness or swelling of  the calf.  Neuro: Alert and oriented to person/place/time  Psychiatric: Normal affect    Emergency Department Course       Imaging:  XR Pelvis 1/2 Views   Final Result   IMPRESSION:       No acute right hip or right femur fracture is identified. No displaced   pelvic fracture. There are very mild degenerative changes in both   hips. Vascular atherosclerotic calcifications are noted.      VICTORIANO BARBOSA MD            SYSTEM ID:  [id number]      XR Femur Right 2 Views   Final Result   IMPRESSION:       No acute right hip or right femur fracture is identified. No displaced   pelvic fracture. There are very mild degenerative changes in both   hips. Vascular atherosclerotic calcifications are noted.      VICTORIANO BARBOSA MD            SYSTEM ID:  [id number]         Laboratory:  Labs Ordered and Resulted from Time of ED Arrival to Time of ED Departure - No data to display     Procedures       Emergency Department Course & Assessments:     Interventions:  Medications   gabapentin (NEURONTIN) capsule 300 mg (has no administration in time range)   methocarbamol (ROBAXIN) tablet 750 mg (750 mg Oral $Given 2/1/24 1243)   ondansetron (ZOFRAN ODT) ODT tab 4 mg (4 mg Oral $Given 2/1/24 1243)   oxyCODONE (ROXICODONE) tablet 5 mg (5 mg Oral $Given 2/1/24 1243)   acetaminophen (TYLENOL) tablet 1,000 mg (1,000 mg Oral $Given 2/1/24 1243)        Independent Interpretation (X-rays, CTs, rhythm strip):  X-rays do not show any sign of fracture or dislocation    Consultations/Assessment/Discussion of Management or Tests:   ED Course as of 02/01/24 1441   Thu Feb 01, 2024   1227 Initial Examination   1420 Rechecked patient        Social Determinants of Health affecting care:   None    Disposition:  The patient was discharged.     Impression & Plan    CMS Diagnoses: None      Medical Decision Making:  Steve Morgan is a 63 year old male with a history of hyperlipidemia, hypertension, hypothyroidism, type 2 diabetes presents emergency  department with a complaint of right leg pain.  Patient reports that he slipped on the ice last week.  Patient reports that he almost did the splits.  He states that he is having pain in the back of his right thigh.  No hip pain or knee pain.  No back pain.  No red flag signs.  Patient has had 2 more falls at home, but reports it has been because of pain.  He did not hit his head or lose consciousness.  He denies any other injuries from the falls.  He states that his leg collapsed underneath him secondary to pain.  On exam patient has extreme muscle tightness in the back of his right thigh, in his hamstring.  No redness, bruising, abrasions.  Patient has full range of motion of his knee and hip.  He is neurovascularly intact.  I think that he has strained this muscle.  I have low suspicion for DVT.  No signs of hematoma.  After medications, patient is feeling much better.  He is able to get up and ambulate with his walker.  He shows me that he can lift his leg off of the bed without any pain.  Patient reports that he is supposed to be wearing ankle braces and use his walker, he was doing neither of those things when he fell.  I did encourage him to use his braces and walker.  Patient is given prescriptions for home including gabapentin and a muscle relaxer.  Patient is discharged home.      Diagnosis:    ICD-10-CM    1. Right leg pain  M79.604            Discharge Medications:  New Prescriptions    No medications on file      Scribe Disclosure:  I, Kenny Mercado, am serving as a scribe at 12:30 PM on 2/1/2024 to document services personally performed by Melany Ferrell DO based on my observations and the provider's statements to me.     2/1/2024   Melany Ferrell DO Richardson, Elizabeth, MD  02/01/24 8247

## 2024-02-14 ENCOUNTER — TELEPHONE (OUTPATIENT)
Dept: ENDOCRINOLOGY | Facility: CLINIC | Age: 64
End: 2024-02-14
Payer: MEDICARE

## 2024-02-14 DIAGNOSIS — E03.4 HYPOTHYROIDISM DUE TO ACQUIRED ATROPHY OF THYROID: ICD-10-CM

## 2024-02-14 RX ORDER — LEVOTHYROXINE SODIUM 137 UG/1
137 TABLET ORAL DAILY
Qty: 20 TABLET | Refills: 0 | Status: SHIPPED | OUTPATIENT
Start: 2024-02-14 | End: 2024-02-28

## 2024-02-14 NOTE — TELEPHONE ENCOUNTER
Requested Prescriptions   Pending Prescriptions Disp Refills    LEVOXYL 137 MCG tablet [Pharmacy Med Name: LEVOXYL 137 MCG TABLET] 90 tablet 3     Sig: TAKE 1 TABLET (137 MCG) BY MOUTH DAILY DISPENSE NAME BRAND LEVOXYL ONLY, NO GENERICS       Thyroid Protocol Failed - 2/14/2024  1:46 AM        Failed - Normal TSH on file in past 12 months     Recent Labs   Lab Test 01/27/23  1126   TSH 0.78              Passed - Patient is 12 years or older        Passed - Recent (12 mo) or future (30 days) visit within the authorizing provider's specialty     The patient must have completed an in-person or virtual visit within the past 12 months or has a future visit scheduled within the next 90 days with the authorizing provider s specialty.  Urgent care and e-visits do not quality as an office visit for this protocol.          Passed - Medication is active on med list

## 2024-02-26 ENCOUNTER — LAB (OUTPATIENT)
Dept: LAB | Facility: CLINIC | Age: 64
End: 2024-02-26
Payer: MEDICARE

## 2024-02-26 DIAGNOSIS — E11.40 TYPE 2 DIABETES MELLITUS WITH DIABETIC NEUROPATHY, WITH LONG-TERM CURRENT USE OF INSULIN (H): ICD-10-CM

## 2024-02-26 DIAGNOSIS — Z79.4 TYPE 2 DIABETES MELLITUS WITH DIABETIC NEUROPATHY, WITH LONG-TERM CURRENT USE OF INSULIN (H): ICD-10-CM

## 2024-02-26 DIAGNOSIS — E03.4 HYPOTHYROIDISM DUE TO ACQUIRED ATROPHY OF THYROID: ICD-10-CM

## 2024-02-26 DIAGNOSIS — E11.40 TYPE 2 DIABETES MELLITUS WITH DIABETIC NEUROPATHY (H): Primary | ICD-10-CM

## 2024-02-26 LAB
CHOLEST SERPL-MCNC: 206 MG/DL
CREAT SERPL-MCNC: 1.74 MG/DL (ref 0.67–1.17)
EGFRCR SERPLBLD CKD-EPI 2021: 43 ML/MIN/1.73M2
FASTING STATUS PATIENT QL REPORTED: ABNORMAL
HBA1C MFR BLD: 7.6 % (ref 0–5.6)
HDLC SERPL-MCNC: 26 MG/DL
LDLC SERPL CALC-MCNC: 130 MG/DL
NONHDLC SERPL-MCNC: 180 MG/DL
T4 FREE SERPL-MCNC: 1.1 NG/DL (ref 0.9–1.7)
TRIGL SERPL-MCNC: 252 MG/DL
TSH SERPL DL<=0.005 MIU/L-ACNC: 12.7 UIU/ML (ref 0.3–4.2)

## 2024-02-26 PROCEDURE — 80061 LIPID PANEL: CPT

## 2024-02-26 PROCEDURE — 36415 COLL VENOUS BLD VENIPUNCTURE: CPT

## 2024-02-26 PROCEDURE — 84443 ASSAY THYROID STIM HORMONE: CPT

## 2024-02-26 PROCEDURE — 84439 ASSAY OF FREE THYROXINE: CPT

## 2024-02-26 PROCEDURE — 83036 HEMOGLOBIN GLYCOSYLATED A1C: CPT

## 2024-02-26 PROCEDURE — 82565 ASSAY OF CREATININE: CPT

## 2024-02-27 ENCOUNTER — VIRTUAL VISIT (OUTPATIENT)
Dept: PSYCHIATRY | Facility: CLINIC | Age: 64
End: 2024-02-27
Payer: MEDICARE

## 2024-02-27 ENCOUNTER — TELEPHONE (OUTPATIENT)
Dept: ENDOCRINOLOGY | Facility: CLINIC | Age: 64
End: 2024-02-27

## 2024-02-27 DIAGNOSIS — F33.1 MAJOR DEPRESSIVE DISORDER, RECURRENT EPISODE, MODERATE (H): Primary | ICD-10-CM

## 2024-02-27 DIAGNOSIS — E03.4 HYPOTHYROIDISM DUE TO ACQUIRED ATROPHY OF THYROID: ICD-10-CM

## 2024-02-27 PROCEDURE — 99213 OFFICE O/P EST LOW 20 MIN: CPT | Mod: 95 | Performed by: PSYCHIATRY & NEUROLOGY

## 2024-02-27 RX ORDER — MIRTAZAPINE 45 MG/1
45 TABLET, FILM COATED ORAL AT BEDTIME
Qty: 30 TABLET | Refills: 2 | Status: SHIPPED | OUTPATIENT
Start: 2024-02-27 | End: 2024-04-10

## 2024-02-27 RX ORDER — MIRTAZAPINE 15 MG/1
15 TABLET, FILM COATED ORAL AT BEDTIME
Qty: 30 TABLET | Refills: 2 | Status: SHIPPED | OUTPATIENT
Start: 2024-02-27 | End: 2024-04-10

## 2024-02-27 NOTE — NURSING NOTE
Is the patient currently in the state of [place]? YES    Visit mode:VIDEO    If the visit is dropped, the patient can be reconnected by: VIDEO VISIT: Text to cell phone:   Telephone Information:   Mobile [phone]       Will anyone else be joining the visit? NO  (If patient encounters technical issues they should call [phone]    How would you like to obtain your AVS? MyChart    Are changes needed to the allergy or medication list? Pt stated no changes to allergies and Pt stated no med changes    Reason for visit: BHUMI GORDILLO

## 2024-02-27 NOTE — PROGRESS NOTES
Virtual Visit Details    Type of service:  Video Visit   Video Start Time: 2:13 PM  Video End Time:2:30 PM    Originating Location (pt. Location): Home    Distant Location (provider location):  Off-site  Platform used for Video Visit: [facility] Psychiatry Consult Note    IDENTIFICATION   Name: Steve Morgan   : 1960/63 year old      Sex:    @ male          Telemedicine Visit: The patient's condition can be safely assessed and treated via synchronous audio and visual telemedicine encounter.        Face to Face/patient Contact total time: 17 minutes  Pre Charting time: 1 minutes; Post charting time, communication and other activities: 1 minutes; Total time 19 minutes            SUBJECTIVE   Doing about the same. Mood more down lately. Does feel wellbutrin has helped some. Wants to work through issues in therapy. Feels talk therapy will have an impact. Tendency to focus on negative made worse by stress.       The following assessments were completed by patient for this visit:  PROMIS 10-Global Health (all questions and answers displayed):       10/27/2022    12:43 PM 5/15/2023    10:44 AM 2023     4:35 PM 2023     2:16 PM 2023    10:10 AM 2023     4:00 PM   PROMIS 10   In general, would you say your health is: Fair Fair Fair Fair Good    In general, would you say your quality of life is: Good Good Very good Good Good    In general, how would you rate your physical health? Poor Fair Poor Poor Fair    In general, how would you rate your mental health, including your mood and your ability to think? Good Fair Fair Fair Fair    In general, how would you rate your satisfaction with your social activities and relationships? Good Fair Fair Good Fair    In general, please rate how well you carry out your usual social activities and roles Fair Good Fair Fair Good    To what extent are you able to carry out your everyday physical activities such as walking, climbing stairs, carrying  groceries, or moving a chair? Mostly Moderately Mostly Completely Moderately    In the past 7 days, how often have you been bothered by emotional problems such as feeling anxious, depressed, or irritable? Sometimes Often Often Sometimes Often    In the past 7 days, how would you rate your fatigue on average? Moderate Moderate Moderate Mild Moderate    In the past 7 days, how would you rate your pain on average, where 0 means no pain, and 10 means worst imaginable pain? 2 1 0 0 1    In general, would you say your health is: 2 2 2 2 3 3   In general, would you say your quality of life is: 3 3 4 3 3 2   In general, how would you rate your physical health? 1 2 1 1 2 2   In general, how would you rate your mental health, including your mood and your ability to think? 3 2 2 2 2 2   In general, how would you rate your satisfaction with your social activities and relationships? 3 2 2 3 2 2   In general, please rate how well you carry out your usual social activities and roles. (This includes activities at home, at work and in your community, and responsibilities as a parent, child, spouse, employee, friend, etc.) 2 3 2 2 3 3   To what extent are you able to carry out your everyday physical activities such as walking, climbing stairs, carrying groceries, or moving a chair? 4 3 4 5 3 3   In the past 7 days, how often have you been bothered by emotional problems such as feeling anxious, depressed, or irritable? 3 4 4 3 4 4   In the past 7 days, how would you rate your fatigue on average? 3 3 3 2 3 3   In the past 7 days, how would you rate your pain on average, where 0 means no pain, and 10 means worst imaginable pain? 2 1 0 0 1 1   Global Mental Health Score 12 9 10 11 9 8   Global Physical Health Score 12 12 13 15 12 12   PROMIS TOTAL - SUBSCORES 24 21 23 26 21 20             11/30/2023     1:00 PM 1/11/2024     9:12 AM 2/26/2024     5:20 PM   PHQ   PHQ-9 Total Score 8 8    8 15   Q9: Thoughts of better off dead/self-harm  past 2 weeks Not at all Not at all Not at all          1/27/2023    10:03 AM 3/10/2023     1:48 PM 6/30/2023     8:21 AM   JOSE MANUEL-7 SCORE   Total Score 2 (minimal anxiety) 5 (mild anxiety) 3 (minimal anxiety)   Total Score 2 5 3        OBJECTIVE     Vital Signs:   There were no vitals taken for this visit.    Labs:  na    Current Medications:  Current Outpatient Medications   Medication    acetaminophen (TYLENOL) 325 MG tablet    ASPIRIN 81 MG OR TABS    atenolol (TENORMIN) 25 MG tablet    buPROPion (WELLBUTRIN XL) 150 MG 24 hr tablet    Calcium Carb-Cholecalciferol (CALCIUM 600 + D PO)    Continuous Blood Gluc  (FREESTYLE GIULIANA 2 READER) SIENA    Continuous Blood Gluc Sensor (FREESTYLE GIULIANA 2 SENSOR) MISC    Cyanocobalamin (VITAMIN B 12 PO)    famotidine (PEPCID) 40 MG tablet    ferrous sulfate 325 (65 FE) MG tablet    finasteride (PROSCAR) 5 MG tablet    gabapentin (NEURONTIN) 300 MG capsule    glipiZIDE (GLUCOTROL XL) 10 MG 24 hr tablet    ibuprofen (ADVIL/MOTRIN) 200 MG tablet    insulin glargine (BASAGLAR KWIKPEN) 100 UNIT/ML pen    insulin pen needle (B-D U/F) 31G X 5 MM miscellaneous    latanoprost (XALATAN) 0.005 % ophthalmic solution    LEVOXYL 137 MCG tablet    losartan (COZAAR) 100 MG tablet    metFORMIN (GLUCOPHAGE) 1000 MG tablet    methocarbamol (ROBAXIN) 500 MG tablet    mirtazapine (REMERON) 45 MG tablet    MULTI-VITAMIN OR TABS    mupirocin (BACTROBAN) 2 % external ointment    pantoprazole (PROTONIX) 40 MG EC tablet    simvastatin (ZOCOR) 20 MG tablet    Urea (URE-NA) 15 g PACK packet    VITAMIN D, CHOLECALCIFEROL, PO     No current facility-administered medications for this visit.        The [place] Prescription Monitoring Program has been reviewed and there are no concerns about diversionary activity for controlled substances at this time.        ADDED HISTORY   Starting with new therapist. Son's girlfriend pregnant, he is starting a new business.       MENTAL STATUS EXAMINATION:    Appearance: intact attention to grooming and hygiene  Attitude:  cooperative   Eye Contact:  good  Gait and Station: sitting  Psychomotor Behavior: Within normal limits  Oriented to:  Grossly person place and time  Attention Span and Concentration:  Grossly intact  Speech:  normal tone  Language: english  Mood: Fair  Affect: Mildly constricted  Associations:  no loose associations  Thought Process:  logical, linear and goal oriented  Thought Content:  No evidence of delusions or suicidal or homicidal ideation plan or intent  Memory: grossly intact  Fund of Knowledge: Good  Insight:  good  Judgment:  intact, adequate for safety  Impulse Control:  intact        DIAGNOSES:   Major Depressive Disorder, moderate, recurrent  SIADH/hyponatremia      ASSESSMENT:   Patient with history of depression, and considerable mood difficulties without prior combo of citalopram/bupropion affecting relationships negatively. Has comorbid diagnoses including SIADH/hyponatermia (possibly worse with selective serotonin reuptake inhibitor) historically, DM, obstructive sleep apnea, b12 deficiency.     Per literature review mirtazapine is less likely to cause hyponatremia, and for current eGFR no dosage necessary. Will pursue.    Today Steve Morgan reports no suicidal ideations. In addition, he has notable risk factors for self-harm, including age and comorbid medical conditions. However, risk is mitigated by commitment to family, Nondenominational beliefs and absence of past attempts. Therefore, based on all available evidence including the factors cited above, he does not appear to be at imminent risk for self-harm, does not meet criteria for a 72-hr hold, and therefore remains appropriate for ongoing outpatient level of care.       PLAN:     Patient advised of consultative model. Patient will continue to be seen for ongoing consultation and stabilization.  Does not meet criteria for involuntary treatment or hospitalization  Add mirtazapine  5/25/23 7.5 mg po at bedtime x 3 nights then 15 mg po at bedtime slight benefit for anger=> 7/27/23 30 mg at bedtime efficacy partially, difficulty with irritability => 11/16/23 45 mg po at bedtime partial benefits => 2/27/24 60 mg qhs-Risks, benefits and alternatives discussed.  Patient provides verbal consent to treatment.  restart bupropion 11/16/23 xl 150 mg daily-Risks, benefits and alternatives discussed.  Patient provides verbal consent to treatment. With renal disease avoid 300 mg can consider 200 mg/24 h  Consider buspirone, lower doses  DC'd citalopram (hyponatremia/SIADH)  Labs - reviewed; follow-up CMP in 10 weeks  Referred for MTM referral, priority on guidance for psychotropic selection and risk of hyponatremia  Therapy with JULIANE Carlin; switching to long term therapy with ISAIAH Hicks   Return in 4-10 weeks    Administrative Billing:   Time spent with patient was greater than 50% of time and/or significant time was spent in counseling and coordination of care regarding above diagnoses and treatment plan. Pre charting time and post charting time/documentation/coordination are done on date of service.      Signed:   Anant Pinto M.D.  [facility] Psychiatry Service    Disclaimer: This note consists of symbols derived from keyboarding, dictation and/or voice recognition software. As a result, there may be errors in the script that have gone undetected. Please consider this when interpreting information found in this chart.    eoc

## 2024-02-27 NOTE — TELEPHONE ENCOUNTER
Component      Latest Ref Rng 2/26/2024  11:45 AM   Creatinine      0.67 - 1.17 mg/dL 1.74 (H)    GFR Estimate      >60 mL/min/1.73m2 43 (L)    TSH      0.30 - 4.20 uIU/mL 12.70 (H)    T4 Free      0.90 - 1.70 ng/dL 1.10    Hemoglobin A1C      0.0 - 5.6 % 7.6 (H)       He has follow-up appointment 5/2024.  Noted to have high TSH.  Can you please check with patient if he is taking levothyroxine consistently?  It appears that he is taking brand Levoxyl and not generic levothyroxine.  What is the dose he is taking?

## 2024-02-28 RX ORDER — LEVOTHYROXINE SODIUM 137 UG/1
137 TABLET ORAL DAILY
Qty: 30 TABLET | Refills: 1 | Status: SHIPPED | OUTPATIENT
Start: 2024-02-28 | End: 2024-04-22

## 2024-02-28 NOTE — TELEPHONE ENCOUNTER
Spoke to pt- pt states the last two weeks he has been taking all his medications together as he was on vacation.     Would  provider like pt to keep on same dosage and recheck labs prior to follow up in may? Or make adjustment

## 2024-02-28 NOTE — TELEPHONE ENCOUNTER
keep on same dosage and recheck labs prior to follow up in may   Please place orders.    TSH   Date Value Ref Range Status   02/26/2024 12.70 (H) 0.30 - 4.20 uIU/mL Final   09/01/2022 0.73 0.40 - 4.00 mU/L Final   03/05/2020 0.55 0.40 - 4.00 mU/L Final     Take Levothyroxine on an empty stomach. Take it with a full glass of water at least 30 minutes to 1 hour before eating breakfast.   This medicine should be taken at least 4 hours before or 4 hours after these medicines: antacids (Maalox , Mylanta , Tums ), calcium supplements, cholestyramine (Prevalite , Questran ), colestipol (Colestid ), iron supplements, orlistat (James , Xenical ), simethicone (Gas-X , Mylicon ), and sucralfate (Carafate ).   Swallow the capsule whole. Do not cut or crush it.

## 2024-03-05 DIAGNOSIS — E11.40 TYPE 2 DIABETES MELLITUS WITH DIABETIC NEUROPATHY, WITH LONG-TERM CURRENT USE OF INSULIN (H): ICD-10-CM

## 2024-03-05 DIAGNOSIS — Z79.4 TYPE 2 DIABETES MELLITUS WITH DIABETIC NEUROPATHY, WITH LONG-TERM CURRENT USE OF INSULIN (H): ICD-10-CM

## 2024-03-05 RX ORDER — INSULIN GLARGINE 100 [IU]/ML
40 INJECTION, SOLUTION SUBCUTANEOUS DAILY
Qty: 30 ML | Refills: 0 | Status: ON HOLD | OUTPATIENT
Start: 2024-03-05 | End: 2024-05-18

## 2024-03-05 NOTE — TELEPHONE ENCOUNTER
"    Requested Prescriptions   Pending Prescriptions Disp Refills    INSULIN GLARGINE 100 UNIT/ML pen [Pharmacy Med Name: BASAGLAR 100 UNIT/ML KWIKPEN]  1     Sig: INJECT 40 UNITS SUBCUTANEOUS DAILY       Long Acting Insulin Protocol Failed - 3/5/2024 11:39 AM        Failed - Recent (6 mo) or future (30 days) visit within the authorizing provider's specialty     Patient had office visit in the last 6 months or has a visit in the next 30 days with authorizing provider or within the authorizing provider's specialty.  See \"Patient Info\" tab in inbasket, or \"Choose Columns\" in Meds & Orders section of the refill encounter.            Passed - Serum creatinine on file in past 12 months     Recent Labs   Lab Test 02/26/24  1145   CR 1.74*       Ok to refill medication if creatinine is low          Passed - HgbA1C in past 3 or 6 months     If HgbA1C is 8 or greater, it needs to be on file within the past 3 months.  If less than 8, must be on file within the past 6 months.     Recent Labs   Lab Test 02/26/24  1145   A1C 7.6*             Passed - Medication is active on med list        Passed - Patient is age 18 or older       Insulin Protocol Failed - 3/5/2024 11:39 AM        Failed - Has GFR on file in past 12 months and most recent value is normal        Failed - Chart Review Required     Review Chart.    Do not approve if insulin is used in a pump.  Instead, direct refill request to the patient's endocrinologist.  If the patient doesn't have an endocrinologist, then send the refill to the patient's PCP for review            Passed - Medication is active on med list        Passed - Recent (6 mo) or future (90 days) visit within the authorizing provider's specialty     The patient must have completed an in-person or virtual visit within the past 6 months or has a future visit scheduled within the next 90 days with the authorizing provider s specialty.  Urgent care and e-visits do not quality as an office visit for this " protocol.          Passed - Medication indicated for associated diagnosis     Medication is associated with one or more of the following diagnoses:   - Type 1 diabetes mellitus  - Type 2 diabetes mellitus  - Diabetic nephropathy; Prophylaxis  - Neuropathy due to diabetes mellitus; Prophylaxis  - Retinopathy due to diabetes mellitus; Prophylaxis  - Diabetes mellitus associated with cystic fibrosis  - Disorder of cardiovascular system; Prophylaxis - Type 1 diabetes mellitus   - Disorder of cardiovascular system; Prophylaxis - Type 2 diabetes mellitus            Passed - Patient is 18 years of age or older

## 2024-03-11 ENCOUNTER — TELEPHONE (OUTPATIENT)
Dept: PSYCHIATRY | Facility: CLINIC | Age: 64
End: 2024-03-11
Payer: MEDICARE

## 2024-03-11 DIAGNOSIS — E03.4 HYPOTHYROIDISM DUE TO ACQUIRED ATROPHY OF THYROID: ICD-10-CM

## 2024-03-11 DIAGNOSIS — I10 ESSENTIAL HYPERTENSION WITH GOAL BLOOD PRESSURE LESS THAN 140/90: ICD-10-CM

## 2024-03-11 RX ORDER — LEVOTHYROXINE SODIUM 137 UG/1
137 TABLET ORAL DAILY
Qty: 90 TABLET | OUTPATIENT
Start: 2024-03-11

## 2024-03-11 RX ORDER — ATENOLOL 25 MG/1
25 TABLET ORAL DAILY
Qty: 90 TABLET | Refills: 1 | Status: SHIPPED | OUTPATIENT
Start: 2024-03-11

## 2024-03-11 NOTE — TELEPHONE ENCOUNTER
[facility] received faxed refill request for mirtazapine (REMERON) 30 MG tablet  dosage has been  Discontinued      Sherrill Huitron CMA March 11, 2024

## 2024-03-12 ENCOUNTER — VIRTUAL VISIT (OUTPATIENT)
Dept: PSYCHOLOGY | Facility: CLINIC | Age: 64
End: 2024-03-12
Payer: MEDICARE

## 2024-03-12 DIAGNOSIS — F33.1 MAJOR DEPRESSIVE DISORDER, RECURRENT EPISODE, MODERATE (H): ICD-10-CM

## 2024-03-12 PROCEDURE — 90834 PSYTX W PT 45 MINUTES: CPT | Mod: 95

## 2024-03-12 ASSESSMENT — ANXIETY QUESTIONNAIRES
GAD7 TOTAL SCORE: 6
7. FEELING AFRAID AS IF SOMETHING AWFUL MIGHT HAPPEN: MORE THAN HALF THE DAYS
5. BEING SO RESTLESS THAT IT IS HARD TO SIT STILL: NOT AT ALL
1. FEELING NERVOUS, ANXIOUS, OR ON EDGE: NOT AT ALL
IF YOU CHECKED OFF ANY PROBLEMS ON THIS QUESTIONNAIRE, HOW DIFFICULT HAVE THESE PROBLEMS MADE IT FOR YOU TO DO YOUR WORK, TAKE CARE OF THINGS AT HOME, OR GET ALONG WITH OTHER PEOPLE: SOMEWHAT DIFFICULT
8. IF YOU CHECKED OFF ANY PROBLEMS, HOW DIFFICULT HAVE THESE MADE IT FOR YOU TO DO YOUR WORK, TAKE CARE OF THINGS AT HOME, OR GET ALONG WITH OTHER PEOPLE?: SOMEWHAT DIFFICULT
GAD7 TOTAL SCORE: 6
3. WORRYING TOO MUCH ABOUT DIFFERENT THINGS: SEVERAL DAYS
2. NOT BEING ABLE TO STOP OR CONTROL WORRYING: NOT AT ALL
7. FEELING AFRAID AS IF SOMETHING AWFUL MIGHT HAPPEN: MORE THAN HALF THE DAYS
GAD7 TOTAL SCORE: 6
4. TROUBLE RELAXING: NOT AT ALL
6. BECOMING EASILY ANNOYED OR IRRITABLE: NEARLY EVERY DAY

## 2024-03-12 ASSESSMENT — PATIENT HEALTH QUESTIONNAIRE - PHQ9
SUM OF ALL RESPONSES TO PHQ QUESTIONS 1-9: 8
SUM OF ALL RESPONSES TO PHQ QUESTIONS 1-9: 8
10. IF YOU CHECKED OFF ANY PROBLEMS, HOW DIFFICULT HAVE THESE PROBLEMS MADE IT FOR YOU TO DO YOUR WORK, TAKE CARE OF THINGS AT HOME, OR GET ALONG WITH OTHER PEOPLE: SOMEWHAT DIFFICULT

## 2024-03-13 DIAGNOSIS — K21.9 GASTROESOPHAGEAL REFLUX DISEASE WITHOUT ESOPHAGITIS: Primary | ICD-10-CM

## 2024-03-13 RX ORDER — PANTOPRAZOLE SODIUM 40 MG/1
40 TABLET, DELAYED RELEASE ORAL DAILY PRN
Qty: 90 TABLET | Refills: 1 | Status: SHIPPED | OUTPATIENT
Start: 2024-03-13 | End: 2024-09-09

## 2024-03-15 NOTE — PROGRESS NOTES
[facility]                                     Progress Note    Patient Name: Steve Morgan  Date: 3/12/2024         Service Type: Individual      Session Start Time: 11:04 PM  Session End Time: 11:55 AM     Session Length: 51 Minutes    Session #: 1, with this provider    Attendees: Client attended alone    Service Modality:  Video Visit:      Provider verified identity through the following two step process.  Patient provided:  Patient  and Patient address    Telemedicine Visit: The patient's condition can be safely assessed and treated via synchronous audio and visual telemedicine encounter.      Reason for Telemedicine Visit: Patient has requested telehealth visit and Services only offered telehealth    Originating Site (Patient Location): Patient's home    Distant Site (Provider Location): Provider Remote Setting- Home Office    Consent:  The patient/guardian has verbally consented to: the potential risks and benefits of telemedicine (video visit) versus in person care; bill my insurance or make self-payment for services provided; and responsibility for payment of non-covered services.     Patient would like the video invitation sent by:  My Chart    Mode of Communication:  [facility]    Distant Location (Provider):  Off-site    As the provider I attest to compliance with applicable laws and regulations related to telemedicine.    DATA  Interactive Complexity: No  Crisis: No        Progress Since Last Session (Related to Symptoms / Goals / Homework):   Symptoms: No change anxiety, stress and irritation    Homework:  Newly Established      Episode of Care Goals: No improvement - PREPARATION (Decided to change - considering how); Intervened by negotiating a change plan and determining options / strategies for behavior change, identifying triggers, exploring social supports, and working towards setting a date to begin behavior change     Current / Ongoing Stressors and  Concerns:   Caio is coming to therapy to learn skills to cope with his anxiety, stress and anger. He reports escalating behaviors when becoming angry with others that often gets in the way of him communication with his family. He reports the pattern of his behaviors has broken down his overall communication with his wife and feeling connected to her. DA was completed by SAURABH Herron     Treatment Objective(s) Addressed in This Session:   use progressive relaxation exercise once in the morning and once in the evening to help relieve tension       Intervention:   Therapist utilized reflected listening as patient gave brief reflection of the past few weeks. Therapist gathered information from patient and mother regarding reasons for setting up the appointment and what has been beneficial from previous therapy. Patient reported increased anxiety and irritation with outbursts towards others. Therapist supported patient as he processed and validated patient. Therapist utilized Motivational Interviewing:  Assess and address ambivalence towards change and readiness and willingness to change, evoke change talk, challenge sustain talk, point out discrepancies, explore ambivalence towards change and roadblocks. Therapist taught clench and release.    Assessments completed prior to visit:  The following assessments were completed by patient for this visit:  PHQ2:       11/28/2023    10:11 AM 3/10/2023     1:53 PM 6/12/2022     4:29 PM 3/28/2022    10:47 AM 11/16/2021    10:14 AM 9/23/2021    10:32 AM 9/2/2020     9:26 AM   PHQ-2 ( 1999 [employer])   Q1: Little interest or pleasure in doing things 1  3 0 1 3 3   Q2: Feeling down, depressed or hopeless 1  1 0 1 1 3   PHQ-2 Score 2  4 0 2 4 6   PHQ-2 Total Score (12-17 Years)- Positive if 3 or more points; Administer PHQ-A if positive      4 6   Q1: Little interest or pleasure in doing things   Nearly every day  Several days Nearly every day Nearly every day   Q2: Feeling  down, depressed or hopeless   Several days  Several days Several days Nearly every day   PHQ-2 Score  Incomplete 4  2 4 6     PHQ9:       8/17/2023     7:46 AM 8/23/2023     2:13 PM 11/16/2023    10:08 AM 11/30/2023     1:00 PM 1/11/2024     9:12 AM 2/26/2024     5:20 PM 3/12/2024    10:23 AM   PHQ-9 SCORE   PHQ-9 Total Score MyChart 14 (Moderate depression) 10 (Moderate depression) 8 (Mild depression)  8 (Mild depression) 15 (Moderately severe depression) 8 (Mild depression)   PHQ-9 Total Score 14 10 8 8 8    8 15 8     GAD2:       12/1/2022    11:36 AM 5/15/2023    10:41 AM 5/21/2023     4:33 PM 8/23/2023     2:14 PM 11/16/2023    10:08 AM 2/26/2024     5:22 PM   JOSE MANUEL-2   Feeling nervous, anxious, or on edge 1 1 0 1 0 1   Not being able to stop or control worrying 1 0 0 0 0 0   JOES MANUEL-2 Total Score 2 1 0 1 0 1    1     GAD7:       11/16/2021    10:11 AM 10/17/2022    10:38 AM 12/20/2022     9:43 AM 1/27/2023    10:03 AM 3/10/2023     1:48 PM 6/30/2023     8:21 AM 3/12/2024    10:24 AM   JOSE MANUEL-7 SCORE   Total Score 3 (minimal anxiety)  2 (minimal anxiety) 2 (minimal anxiety) 5 (mild anxiety) 3 (minimal anxiety) 6 (mild anxiety)   Total Score 3 6 2 2 5 3 6     PROMIS 10-Global Health (all questions and answers displayed):       10/27/2022    12:43 PM 5/15/2023    10:44 AM 5/21/2023     4:35 PM 8/23/2023     2:16 PM 11/16/2023    10:10 AM 12/1/2023     4:00 PM 3/12/2024    10:26 AM   PROMIS 10   In general, would you say your health is: Fair Fair Fair Fair Good  Fair   In general, would you say your quality of life is: Good Good Very good Good Good  Good   In general, how would you rate your physical health? Poor Fair Poor Poor Fair  Fair   In general, how would you rate your mental health, including your mood and your ability to think? Good Fair Fair Fair Fair  Poor   In general, how would you rate your satisfaction with your social activities and relationships? Good Fair Fair Good Fair  Poor   In general, please rate how  well you carry out your usual social activities and roles Fair Good Fair Fair Good  Fair   To what extent are you able to carry out your everyday physical activities such as walking, climbing stairs, carrying groceries, or moving a chair? Mostly Moderately Mostly Completely Moderately  Not at all   In the past 7 days, how often have you been bothered by emotional problems such as feeling anxious, depressed, or irritable? Sometimes Often Often Sometimes Often  Often   In the past 7 days, how would you rate your fatigue on average? Moderate Moderate Moderate Mild Moderate  Moderate   In the past 7 days, how would you rate your pain on average, where 0 means no pain, and 10 means worst imaginable pain? 2 1 0 0 1  0   In general, would you say your health is: 2 2 2 2 3  2   In general, would you say your quality of life is: 3 3 4 3 3  3   In general, how would you rate your physical health? 1 2 1 1 2  2   In general, how would you rate your mental health, including your mood and your ability to think? 3 2 2 2 2  1   In general, how would you rate your satisfaction with your social activities and relationships? 3 2 2 3 2  1   In general, please rate how well you carry out your usual social activities and roles. (This includes activities at home, at work and in your community, and responsibilities as a parent, child, spouse, employee, friend, etc.) 2 3 2 2 3  2   To what extent are you able to carry out your everyday physical activities such as walking, climbing stairs, carrying groceries, or moving a chair? 4 3 4 5 3  1   In the past 7 days, how often have you been bothered by emotional problems such as feeling anxious, depressed, or irritable? 3 4 4 3 4  4   In the past 7 days, how would you rate your fatigue on average? 3 3 3 2 3  3   In the past 7 days, how would you rate your pain on average, where 0 means no pain, and 10 means worst imaginable pain? 2 1 0 0 1  0   Global Mental Health Score 12 9 10 11 9  7   Global  Physical Health Score 12 12 13 15 12  11   PROMIS TOTAL - SUBSCORES 24 21 23 26 21  18       Information is confidential and restricted. Go to Review Flowsheets to unlock data.         ASSESSMENT: Current Emotional / Mental Status (status of significant symptoms):   Risk status (Self / Other harm or suicidal ideation)   Patient denies current fears or concerns for personal safety.   Patient denies current or recent suicidal ideation or behaviors.   Patient denies current or recent homicidal ideation or behaviors.   Patient denies current or recent self injurious behavior or ideation.   Patient denies other safety concerns.   Patient reports there has been no change in risk factors since their last session.     Patient reports there has been no change in protective factors since their last session.     Recommended that patient call 911 or go to the local ED should there be a change in any of these risk factors.     Appearance:   Appropriate    Eye Contact:   Good    Psychomotor Behavior: Normal    Attitude:   Cooperative    Orientation:   All   Speech    Rate / Production: Normal     Volume:  Normal    Mood:    Anxious  Irritable    Affect:    Appropriate  Subdued    Thought Content:  Clear    Thought Form:  Coherent  Goal Directed  Logical    Insight:    Good  and Fair      Medication Review:   No changes to current psychiatric medication(s)     Medication Compliance:   Yes     Changes in Health Issues:   None reported     Chemical Use Review:   Substance Use: Chemical use reviewed, no active concerns identified      Tobacco Use: No current tobacco use.      Diagnosis:  1. Major depressive disorder, recurrent episode, moderate (H)        Collateral Reports Completed:   Not Applicable    PLAN: (Patient Tasks / Therapist Tasks / Other)  Caio will practice clench and release daily.    SAURABH Fernandes  March 12, 2024

## 2024-03-20 DIAGNOSIS — R80.9 PROTEINURIA: ICD-10-CM

## 2024-03-20 DIAGNOSIS — N18.30 STAGE 3 CHRONIC KIDNEY DISEASE, UNSPECIFIED WHETHER STAGE 3A OR 3B CKD (H): Primary | ICD-10-CM

## 2024-03-26 ENCOUNTER — VIRTUAL VISIT (OUTPATIENT)
Dept: PSYCHOLOGY | Facility: CLINIC | Age: 64
End: 2024-03-26
Payer: MEDICARE

## 2024-03-26 DIAGNOSIS — F33.1 MAJOR DEPRESSIVE DISORDER, RECURRENT EPISODE, MODERATE (H): Primary | ICD-10-CM

## 2024-03-26 PROCEDURE — 90834 PSYTX W PT 45 MINUTES: CPT | Mod: 95

## 2024-03-29 NOTE — PROGRESS NOTES
[facility]                                     Progress Note    Patient Name: Steve Morgan  Date: 3/26/2024         Service Type: Individual      Session Start Time: 10:04 PM  Session End Time: 10:54 AM     Session Length: 50 Minutes    Session #: 2    Attendees: Client attended alone    Service Modality:  Video Visit:      Provider verified identity through the following two step process.  Patient provided:  Patient is known previously to provider    Telemedicine Visit: The patient's condition can be safely assessed and treated via synchronous audio and visual telemedicine encounter.      Reason for Telemedicine Visit: Patient has requested telehealth visit and Services only offered telehealth    Originating Site (Patient Location): Patient's home    Distant Site (Provider Location): Provider Remote Setting- Home Office    Consent:  The patient/guardian has verbally consented to: the potential risks and benefits of telemedicine (video visit) versus in person care; bill my insurance or make self-payment for services provided; and responsibility for payment of non-covered services.     Patient would like the video invitation sent by:  Send to e-mail at: [email or web address] ?    Mode of Communication:  Video Conference via [facility]    Distant Location (Provider):  Off-site    As the provider I attest to compliance with applicable laws and regulations related to telemedicine.    DATA  Interactive Complexity: No  Crisis: No        Progress Since Last Session (Related to Symptoms / Goals / Homework):   Symptoms: No change anxiety, stress and irritation    Homework: Completed in session  Did not complete      Episode of Care Goals: No improvement - PREPARATION (Decided to change - considering how); Intervened by negotiating a change plan and determining options / strategies for behavior change, identifying triggers, exploring social supports, and working towards setting a date to begin behavior  change     Current / Ongoing Stressors and Concerns:   Caio is coming to therapy to learn skills to cope with his anxiety, stress and anger. He reports escalating behaviors when becoming angry with others that often gets in the way of him communication with his family. He reports the pattern of his behaviors has broken down his overall communication with his wife and feeling connected to her. DA was completed by SAURABH Herron     Treatment Objective(s) Addressed in This Session:   use progressive relaxation exercise once in the morning and once in the evening to help relieve tension       Intervention:   Therapist utilized reflected listening as patient gave brief reflection of the past few weeks. Patient reported continued anxiety and irritation with outbursts towards others. He processed a  recent interaction with his wife and fear of her  him. Therapist supported patient as he processed and validated patient. Therapist provider psycho-education about non-verbal communication. Therapist reviewed clench and release.    Assessments completed prior to visit:  The following assessments were completed by patient for this visit:  PHQ2:       11/28/2023    10:11 AM 3/10/2023     1:53 PM 6/12/2022     4:29 PM 3/28/2022    10:47 AM 11/16/2021    10:14 AM 9/23/2021    10:32 AM 9/2/2020     9:26 AM   PHQ-2 ( 1999 [employer])   Q1: Little interest or pleasure in doing things 1  3 0 1 3 3   Q2: Feeling down, depressed or hopeless 1  1 0 1 1 3   PHQ-2 Score 2  4 0 2 4 6   PHQ-2 Total Score (12-17 Years)- Positive if 3 or more points; Administer PHQ-A if positive      4 6   Q1: Little interest or pleasure in doing things   Nearly every day  Several days Nearly every day Nearly every day   Q2: Feeling down, depressed or hopeless   Several days  Several days Several days Nearly every day   PHQ-2 Score  Incomplete 4  2 4 6     PHQ9:       8/17/2023     7:46 AM 8/23/2023     2:13 PM 11/16/2023    10:08 AM 11/30/2023      1:00 PM 1/11/2024     9:12 AM 2/26/2024     5:20 PM 3/12/2024    10:23 AM   PHQ-9 SCORE   PHQ-9 Total Score MyChart 14 (Moderate depression) 10 (Moderate depression) 8 (Mild depression)  8 (Mild depression) 15 (Moderately severe depression) 8 (Mild depression)   PHQ-9 Total Score 14 10 8 8 8    8 15 8     GAD2:       12/1/2022    11:36 AM 5/15/2023    10:41 AM 5/21/2023     4:33 PM 8/23/2023     2:14 PM 11/16/2023    10:08 AM 2/26/2024     5:22 PM   JOSE MANUEL-2   Feeling nervous, anxious, or on edge 1 1 0 1 0 1   Not being able to stop or control worrying 1 0 0 0 0 0   JOSE MANUEL-2 Total Score 2 1 0 1 0 1    1     GAD7:       11/16/2021    10:11 AM 10/17/2022    10:38 AM 12/20/2022     9:43 AM 1/27/2023    10:03 AM 3/10/2023     1:48 PM 6/30/2023     8:21 AM 3/12/2024    10:24 AM   JOSE MANUEL-7 SCORE   Total Score 3 (minimal anxiety)  2 (minimal anxiety) 2 (minimal anxiety) 5 (mild anxiety) 3 (minimal anxiety) 6 (mild anxiety)   Total Score 3 6 2 2 5 3 6     PROMIS 10-Global Health (all questions and answers displayed):       10/27/2022    12:43 PM 5/15/2023    10:44 AM 5/21/2023     4:35 PM 8/23/2023     2:16 PM 11/16/2023    10:10 AM 12/1/2023     4:00 PM 3/12/2024    10:26 AM   PROMIS 10   In general, would you say your health is: Fair Fair Fair Fair Good  Fair   In general, would you say your quality of life is: Good Good Very good Good Good  Good   In general, how would you rate your physical health? Poor Fair Poor Poor Fair  Fair   In general, how would you rate your mental health, including your mood and your ability to think? Good Fair Fair Fair Fair  Poor   In general, how would you rate your satisfaction with your social activities and relationships? Good Fair Fair Good Fair  Poor   In general, please rate how well you carry out your usual social activities and roles Fair Good Fair Fair Good  Fair   To what extent are you able to carry out your everyday physical activities such as walking, climbing stairs, carrying groceries,  or moving a chair? Mostly Moderately Mostly Completely Moderately  Not at all   In the past 7 days, how often have you been bothered by emotional problems such as feeling anxious, depressed, or irritable? Sometimes Often Often Sometimes Often  Often   In the past 7 days, how would you rate your fatigue on average? Moderate Moderate Moderate Mild Moderate  Moderate   In the past 7 days, how would you rate your pain on average, where 0 means no pain, and 10 means worst imaginable pain? 2 1 0 0 1  0   In general, would you say your health is: 2 2 2 2 3  2   In general, would you say your quality of life is: 3 3 4 3 3  3   In general, how would you rate your physical health? 1 2 1 1 2  2   In general, how would you rate your mental health, including your mood and your ability to think? 3 2 2 2 2  1   In general, how would you rate your satisfaction with your social activities and relationships? 3 2 2 3 2  1   In general, please rate how well you carry out your usual social activities and roles. (This includes activities at home, at work and in your community, and responsibilities as a parent, child, spouse, employee, friend, etc.) 2 3 2 2 3  2   To what extent are you able to carry out your everyday physical activities such as walking, climbing stairs, carrying groceries, or moving a chair? 4 3 4 5 3  1   In the past 7 days, how often have you been bothered by emotional problems such as feeling anxious, depressed, or irritable? 3 4 4 3 4  4   In the past 7 days, how would you rate your fatigue on average? 3 3 3 2 3  3   In the past 7 days, how would you rate your pain on average, where 0 means no pain, and 10 means worst imaginable pain? 2 1 0 0 1  0   Global Mental Health Score 12 9 10 11 9  7   Global Physical Health Score 12 12 13 15 12  11   PROMIS TOTAL - SUBSCORES 24 21 23 26 21  18       Information is confidential and restricted. Go to Review Flowsheets to unlock data.         ASSESSMENT: Current Emotional /  Mental Status (status of significant symptoms):   Risk status (Self / Other harm or suicidal ideation)   Patient denies current fears or concerns for personal safety.   Patient denies current or recent suicidal ideation or behaviors.   Patient denies current or recent homicidal ideation or behaviors.   Patient denies current or recent self injurious behavior or ideation.   Patient denies other safety concerns.   Patient reports there has been no change in risk factors since their last session.     Patient reports there has been no change in protective factors since their last session.     Recommended that patient call 911 or go to the local ED should there be a change in any of these risk factors.     Appearance:   Appropriate    Eye Contact:   Good    Psychomotor Behavior: Normal    Attitude:   Cooperative    Orientation:   All   Speech    Rate / Production: Normal     Volume:  Normal    Mood:    Anxious  Irritable    Affect:    Appropriate  Subdued    Thought Content:  Clear    Thought Form:  Coherent  Goal Directed  Logical    Insight:    Good  and Fair      Medication Review:   No changes to current psychiatric medication(s)     Medication Compliance:   Yes     Changes in Health Issues:   None reported     Chemical Use Review:   Substance Use: Chemical use reviewed, no active concerns identified      Tobacco Use: No current tobacco use.      Diagnosis:  1. Major depressive disorder, recurrent episode, moderate (H)        Collateral Reports Completed:   Not Applicable    PLAN: (Patient Tasks / Therapist Tasks / Other)  Caio will practice clench and release daily. He will look into [facility] [employer] [facility] class.     SAURABH Fernandes

## 2024-04-10 ENCOUNTER — VIRTUAL VISIT (OUTPATIENT)
Dept: PSYCHIATRY | Facility: CLINIC | Age: 64
End: 2024-04-10
Payer: MEDICARE

## 2024-04-10 ENCOUNTER — VIRTUAL VISIT (OUTPATIENT)
Dept: BEHAVIORAL HEALTH | Facility: CLINIC | Age: 64
End: 2024-04-10
Payer: MEDICARE

## 2024-04-10 DIAGNOSIS — F33.0 MILD EPISODE OF RECURRENT MAJOR DEPRESSIVE DISORDER (H): Primary | ICD-10-CM

## 2024-04-10 DIAGNOSIS — F33.1 MAJOR DEPRESSIVE DISORDER, RECURRENT EPISODE, MODERATE (H): Primary | ICD-10-CM

## 2024-04-10 PROCEDURE — 90832 PSYTX W PT 30 MINUTES: CPT | Mod: 95 | Performed by: COUNSELOR

## 2024-04-10 PROCEDURE — 99213 OFFICE O/P EST LOW 20 MIN: CPT | Mod: 95 | Performed by: PSYCHIATRY & NEUROLOGY

## 2024-04-10 RX ORDER — MIRTAZAPINE 15 MG/1
15 TABLET, FILM COATED ORAL AT BEDTIME
Qty: 30 TABLET | Refills: 2 | Status: SHIPPED | OUTPATIENT
Start: 2024-04-10 | End: 2024-06-18

## 2024-04-10 RX ORDER — BUPROPION HYDROCHLORIDE 150 MG/1
150 TABLET ORAL EVERY MORNING
Qty: 90 TABLET | Refills: 1 | Status: SHIPPED | OUTPATIENT
Start: 2024-04-10 | End: 2024-07-09

## 2024-04-10 RX ORDER — MIRTAZAPINE 45 MG/1
45 TABLET, FILM COATED ORAL AT BEDTIME
Qty: 30 TABLET | Refills: 2 | Status: SHIPPED | OUTPATIENT
Start: 2024-04-10 | End: 2024-06-18

## 2024-04-10 ASSESSMENT — PATIENT HEALTH QUESTIONNAIRE - PHQ9
10. IF YOU CHECKED OFF ANY PROBLEMS, HOW DIFFICULT HAVE THESE PROBLEMS MADE IT FOR YOU TO DO YOUR WORK, TAKE CARE OF THINGS AT HOME, OR GET ALONG WITH OTHER PEOPLE: SOMEWHAT DIFFICULT
SUM OF ALL RESPONSES TO PHQ QUESTIONS 1-9: 17
SUM OF ALL RESPONSES TO PHQ QUESTIONS 1-9: 17

## 2024-04-10 ASSESSMENT — PAIN SCALES - GENERAL: PAINLEVEL: NO PAIN (0)

## 2024-04-10 NOTE — NURSING NOTE
Is the patient currently in the state of [place]? YES    Visit mode:VIDEO    If the visit is dropped, the patient can be reconnected by: VIDEO VISIT: Send to e-mail at: [email or web address]    Will anyone else be joining the visit? NO  (If patient encounters technical issues they should call [phone]    How would you like to obtain your AVS? MyChart    Are changes needed to the allergy or medication list? Pt stated no changes to allergies and Pt stated no med changes    Reason for visit: BHUMI GORDILLO

## 2024-04-10 NOTE — PROGRESS NOTES
[facility] Psychiatry Services [facility]  4/10/2024      Behavioral Health Clinician Progress Note    Patient Name: Steve Morgan           Service Type:  Individual      Service Location:   MyChart / Email (patient reached)     Session Start Time: 230pm  Session End Time: 306pm      Session Length: 16 - 37      Attendees: Patient     Service Modality:  Video Visit:      Provider verified identity through the following two step process.  Patient provided:  Patient photo and Patient was verified at admission/transfer    Telemedicine Visit: The patient's condition can be safely assessed and treated via synchronous audio and visual telemedicine encounter.      Reason for Telemedicine Visit: Services only offered telehealth    Originating Site (Patient Location): Patient's home    Distant Site (Provider Location): Provider Remote Setting- Home Office    Consent:  The patient/guardian has verbally consented to: the potential risks and benefits of telemedicine (video visit) versus in person care; bill my insurance or make self-payment for services provided; and responsibility for payment of non-covered services.     Patient would like the video invitation sent by:  My Chart    Mode of Communication:  Video Conference via [facility]    Distant Location (Provider):  Off-site    As the provider I attest to compliance with applicable laws and regulations related to telemedicine.    Visit Activities (Refresh list every visit): [facility] Only    Diagnostic Assessment Date: 1/31/24 Margaret Asif   Treatment Plan Review Date: 7/10/2024   See Flowsheets for today's PHQ-9 and JOSE MANUEL-7 results  Previous PHQ-9:       2/26/2024     5:20 PM 3/12/2024    10:23 AM 4/10/2024     2:12 PM   PHQ-9 SCORE   PHQ-9 Total Score MyCmegant 15 (Moderately severe depression) 8 (Mild depression) 17 (Moderately severe depression)   PHQ-9 Total Score 15 8 17     Previous JOSE MANUEL-7:       3/10/2023     1:48 PM 6/30/2023     8:21 AM 3/12/2024     10:24 AM   JOSE MANUEL-7 SCORE   Total Score 5 (mild anxiety) 3 (minimal anxiety) 6 (mild anxiety)   Total Score 5 3 6   See JOSE MANUEL 2 below for current scores.     SHANNON LEVEL:      1/15/2020     8:18 PM 12/16/2022     2:26 PM   SHANNON Score (Last Two)   SHANNON Raw Score 36 34   Activation Score 75.5 68.9   SHANNON Level 4 3       DATA  Extended Session (60+ minutes): No  Interactive Complexity: No  Crisis: No  [facility] Patient: No    Treatment Objective(s) Addressed in This Session:  Target Behavior(s):  improve stress and anger/irritability    Depressed Mood: Decrease frequency and intensity of feeling down, depressed, hopeless  Improve quantity and quality of night time sleep / decrease daytime naps  Feel less tired and more energy during the day   Anger Management: will learn and practice positive anger management skills     Current Stressors / Issues:  Questions/Thoughts for Dr. Pinto: none    Current Symptoms: Pt repots thing have been going okay. He says there have been some stressors with family dynamics. Dr. Pinto increased medication to 60 mg and pt hasn't noticed much of a difference. 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 He says that his irritability has been about the same. In 2026 is when his wife will retire and they will be home together. Pt has talked about wanting to go to [employer] more.     Side Effects: none    Suicidality: pt denies when [facility] asks about pt's response to the PHQ9 question 9    Therapist: met with Justina and are just getting started, they talked that there is a men's group for anger in [place] and pt may look into this   [facility] recommendations: encourages patient to explore reasons that he wants to get back to [employer] to find motivating factors.      Progress on Treatment Objective(s) / Homework:  Minimal progress - ACTION (Actively working towards change); Intervened by reinforcing change plan / affirming steps taken    CBT: Pt that his irritability has been about the same.  He reports that along with  his therapist he is looking at a men's group for anger to learn some different skills.    [facility] empathizes with patient that the irritability/anger has been about the same as supports patient to continue to get outside.  [facility] encourages patient to explore reasons that he wants to get back to Religion to find motivating factors.  [facility] provides a brief background of different type of anger support groups.    Motivational Interviewing    MI Intervention: Co-Developed Goal: reduce anger and irritability and developing supports and community, Expressed Empathy/Understanding, Supported Autonomy, Collaboration, Evocation, Permission to raise concern or advise, Open-ended questions, Reflections: simple and complex, Change talk (evoked), and Reframe     Change Talk Expressed by the Patient: Committment to change Activation Taking steps    Provider Response to Change Talk: E - Evoked more info from patient about behavior change, A - Affirmed patient's thoughts, decisions, or attempts at behavior change, R - Reflected patient's change talk, and S - Summarized patient's change talk statements    Also provided psychoeducation about behavioral health condition, symptoms, and treatment options    Assessments completed prior to visit:  The following assessments were completed by patient for this visit:  PHQ9:       8/23/2023     2:13 PM 11/16/2023    10:08 AM 11/30/2023     1:00 PM 1/11/2024     9:12 AM 2/26/2024     5:20 PM 3/12/2024    10:23 AM 4/10/2024     2:12 PM   PHQ-9 SCORE   PHQ-9 Total Score MyChart 10 (Moderate depression) 8 (Mild depression)  8 (Mild depression) 15 (Moderately severe depression) 8 (Mild depression) 17 (Moderately severe depression)   PHQ-9 Total Score 10 8 8 8    8 15 8 17     PROMIS 10-Global Health (only subscores and total score):       5/15/2023    10:44 AM 5/21/2023     4:35 PM 8/23/2023     2:16 PM 11/16/2023    10:10 AM 12/1/2023     4:00 PM 3/12/2024    10:26 AM 4/10/2024     2:15 PM   PROMIS-10  Scores Only   Global Mental Health Score 9 10 11 9 8 7 10    10   Global Physical Health Score 12 13 15 12 12 11 13    13   PROMIS TOTAL - SUBSCORES 21 23 26 21 20 18 23    23     GAD2:       12/1/2022    11:36 AM 5/15/2023    10:41 AM 5/21/2023     4:33 PM 8/23/2023     2:14 PM 11/16/2023    10:08 AM 2/26/2024     5:22 PM   JOSE MANUEL-2   Feeling nervous, anxious, or on edge 1 1 0 1 0 1   Not being able to stop or control worrying 1 0 0 0 0 0   JOSE MANUEL-2 Total Score 2 1 0 1 0 1    1       Care Plan review completed: Yes    Medication Review:  No changes to current psychiatric medication(s)    Medication Compliance:  Yes    Changes in Health Issues:   None reported    Chemical Use Review:   Substance Use: Chemical use reviewed, no active concerns identified      Tobacco Use: No current tobacco use.      Assessment: Current Emotional / Mental Status (status of significant symptoms):  Risk status (Self / Other harm or suicidal ideation)  Patient denies a history of suicidal ideation, suicide attempts, self-injurious behavior, homicidal ideation, homicidal behavior, and and other safety concerns   Patient denies current fears or concerns for personal safety.  Patient denies current or recent suicidal ideation or behaviors.  Patient denies current or recent homicidal ideation or behaviors.  Patient denies current or recent self injurious behavior or ideation.  Patient denies other safety concerns.  A safety and risk management plan has not been developed at this time, however patient was encouraged to call [place] [phone] should there be a change in any of these risk factors.    Appearance:   Appropriate    Eye Contact:   Good   Psychomotor Behavior: Normal   Attitude:   Cooperative  Interested Pleasant  Orientation:   All  Speech   Rate / Production: Normal    Volume:  Normal   Mood:    Irritable  Sad   Affect:    Appropriate   Thought Content:  Clear   Thought Form:  Coherent  Logical   Insight:    Good     Diagnoses:  1.  "Mild episode of recurrent major depressive disorder (H24)          Collateral Reports Completed:  Communicated with: Anant Pinto M.D.     Plan: (Homework, other):  Patient was given information about behavioral services and encouraged to schedule a follow up appointment with the clinic [facility] in 1 month.  He was also given information about mental health symptoms and treatment options .  CD Recommendations: No indications of CD issues.     Catia Hanzal, [facility]    ______________________________________________________________________    Integrated Primary Care Behavioral Health Treatment Plan    Patient's Name: Steve Morgan  YOB: 1960    Date of Creation: 1/11/2024  Date Treatment Plan Last Reviewed/Revised: 4/10/24    DSM5 Diagnoses:   1. Mild episode of recurrent major depressive disorder (H24)      Psychosocial / Contextual Factors: has wife, irritability/anger  PROMIS (reviewed every 90 days):   PROMIS 10-Global Health (only subscores and total score):       5/15/2023    10:44 AM 5/21/2023     4:35 PM 8/23/2023     2:16 PM 11/16/2023    10:10 AM 12/1/2023     4:00 PM 3/12/2024    10:26 AM 4/10/2024     2:15 PM   PROMIS-10 Scores Only   Global Mental Health Score 9 10 11 9 8 7 10    10   Global Physical Health Score 12 13 15 12 12 11 13    13   PROMIS TOTAL - SUBSCORES 21 23 26 21 20 18 23    23       Referral / Collaboration:  Referral to another professional/service is not indicated at this time.    Anticipated number of session for this episode of care: 5-6  Anticipation frequency of session: Monthly  Anticipated Duration of each session: 16-37 minutes  Treatment plan will be reviewed in 90 days or when goals have been changed.       MeasurableTreatment Goal(s) related to diagnosis / functional impairment(s)  Goal 1: Patient will report improved irritability and depression     Pt will know he's met goal when \"I am looking into joining an anger group.\"     Objective #A (Patient Action)           "                Patient will work to find 1-2 ways to reduce anger/irritability.   Status: Continue - Date: 4/10/2024    Intervention(s)  Therapist provide support through CBT, MI, Acceptance and Commitment Therapy and problem solving model to explore and overcome barriers.      Patient has reviewed and agreed to the above plan.      SAURABH Andujar  April 10, 2024

## 2024-04-10 NOTE — PROGRESS NOTES
Virtual Visit Details    Type of service:  Video Visit   Video Start Time: 3:15 PM  Video End Time:3:29 PM    Originating Location (pt. Location): Home    Distant Location (provider location):  On-site  Platform used for Video Visit: [facility] Psychiatry Consult Note    IDENTIFICATION   Name: Steve Morgan   : 1960/64 year old      Sex:    @ male          Telemedicine Visit: The patient's condition can be safely assessed and treated via synchronous audio and visual telemedicine encounter.        Face to Face/patient Contact total time: 14 minutes  Pre Charting time: 3 minutes; Post charting time, communication and other activities: 1 minutes; Total time 18 minutes  2:15 PM -          SUBJECTIVE   Reports depressive sx are about the same. No noticeable improvements. Anxiety is not a concern. Depression is at 6-7/10.  He prefers to stay on current medication without change.  He has no side effects from mirtazapine.      The following assessments were completed by patient for this visit:  PROMIS 10-Global Health (only subscores and total score):       5/15/2023    10:44 AM 2023     4:35 PM 2023     2:16 PM 2023    10:10 AM 2023     4:00 PM 3/12/2024    10:26 AM 4/10/2024     2:15 PM   PROMIS-10 Scores Only   Global Mental Health Score 9 10 11 9 8 7 10    10   Global Physical Health Score 12 13 15 12 12 11 13    13   PROMIS TOTAL - SUBSCORES 21 23 26 21 20 18 23    23             2024     5:20 PM 3/12/2024    10:23 AM 4/10/2024     2:12 PM   PHQ   PHQ-9 Total Score 15 8 17   Q9: Thoughts of better off dead/self-harm past 2 weeks Not at all Not at all Several days   F/U: Thoughts of suicide or self-harm   No   F/U: Safety concerns   No          3/10/2023     1:48 PM 2023     8:21 AM 3/12/2024    10:24 AM   JOSE MANUEL-7 SCORE   Total Score 5 (mild anxiety) 3 (minimal anxiety) 6 (mild anxiety)   Total Score 5 3 6        OBJECTIVE     Vital Signs:   There were no vitals taken  for this visit.    Labs:  NA    Current Medications:  Current Outpatient Medications   Medication Sig Dispense Refill    acetaminophen (TYLENOL) 325 MG tablet Take 2 tablets (650 mg) by mouth every 4 hours as needed for pain or fever 100 tablet 0    ASPIRIN 81 MG OR TABS Take 81 mg by mouth every evening 100 3    atenolol (TENORMIN) 25 MG tablet TAKE 1 TABLET BY MOUTH EVERY DAY 90 tablet 1    buPROPion (WELLBUTRIN XL) 150 MG 24 hr tablet Take 1 tablet (150 mg) by mouth every morning 90 tablet 1    Calcium Carb-Cholecalciferol (CALCIUM 600 + D PO) Take 1 tablet by mouth daily      Continuous Blood Gluc  (FREESTYLE GIULIANA 2 READER) SIENA 1 Device continuous 1 each 0    Continuous Blood Gluc Sensor (FREESTYLE GIULIANA 2 SENSOR) MISC 1 each every 14 days Use 1 sensor every 14 days. Use to read blood sugars per 's instructions. 2 each 11    Cyanocobalamin (VITAMIN B 12 PO) Take 250 mcg by mouth daily      famotidine (PEPCID) 40 MG tablet TAKE 1 TABLET BY MOUTH EVERY DAY 90 tablet 1    ferrous sulfate 325 (65 FE) MG tablet Take 1 tablet by mouth daily (with breakfast).      finasteride (PROSCAR) 5 MG tablet TAKE 1 TABLET BY MOUTH EVERY DAY 90 tablet 1    gabapentin (NEURONTIN) 300 MG capsule Take 1 capsule (300 mg) by mouth 3 times daily for 5 days 15 capsule 0    glipiZIDE (GLUCOTROL XL) 10 MG 24 hr tablet Take 1 tablet (10 mg) by mouth daily 90 tablet 1    ibuprofen (ADVIL/MOTRIN) 200 MG tablet Take 400 mg by mouth every 6 hours as needed for pain      INSULIN GLARGINE 100 UNIT/ML pen INJECT 40 UNITS SUBCUTANEOUS DAILY 30 mL 0    insulin pen needle (B-D U/F) 31G X 5 MM miscellaneous USE 4 DAILY OR AS DIRECTED. 400 each 0    latanoprost (XALATAN) 0.005 % ophthalmic solution INSTILL 1 DROP INTO BOTH EYES IN THE EVENING      LEVOXYL 137 MCG tablet Take 1 tablet (137 mcg) by mouth daily Dispense name brand Levoxyl only, no generics 30 tablet 1    losartan (COZAAR) 100 MG tablet Take 1 tablet (100 mg) by mouth  daily 90 tablet 1    metFORMIN (GLUCOPHAGE) 1000 MG tablet Take 1 tablet (1,000 mg) by mouth 2 times daily (with meals) 180 tablet 1    methocarbamol (ROBAXIN) 500 MG tablet Take 1 tablet (500 mg) by mouth 4 times daily as needed for muscle spasms 15 tablet 0    mirtazapine (REMERON) 15 MG tablet Take 1 tablet (15 mg) by mouth at bedtime 30 tablet 2    mirtazapine (REMERON) 45 MG tablet Take 1 tablet (45 mg) by mouth at bedtime Take along with a 15 mg tablet for a total of 60 mg nightly 30 tablet 2    MULTI-VITAMIN OR TABS Take 1 tablet by mouth daily 30 0    mupirocin (BACTROBAN) 2 % external ointment Apply topically 3 times daily To finger 22 g 1    pantoprazole (PROTONIX) 40 MG EC tablet Take 1 tablet (40 mg) by mouth daily as needed for heartburn 90 tablet 1    simvastatin (ZOCOR) 20 MG tablet Take 1 tablet (20 mg) by mouth At Bedtime 90 tablet 1    Urea (URE-NA) 15 g PACK packet Take 15 g by mouth daily (Patient not taking: Reported on 11/28/2023) 30 packet 0    VITAMIN D, CHOLECALCIFEROL, PO Take 1,000 Units by mouth daily.       No current facility-administered medications for this visit.        The [place] Prescription Monitoring Program has been reviewed and there are no concerns about diversionary activity for controlled substances at this time.        ADDED HISTORY   Definitely having ups and downs in general in marriage and disagreement. Big argument a couple of weeks ago.     MENTAL STATUS EXAMINATION:   Appearance: intact attention to grooming and hygiene  Attitude:  cooperative   Eye Contact:  good  Gait and Station: sitting  Psychomotor Behavior: Within normal limits  Oriented to:  Grossly person place and time  Attention Span and Concentration:  Grossly intact  Speech:  normal tone  Language: english  Mood: Fair  Affect: Mildly constricted  Associations:  no loose associations  Thought Process:  logical, linear and goal oriented  Thought Content:  No evidence of delusions or suicidal or homicidal  ideation plan or intent  Memory: grossly intact  Fund of Knowledge: Good  Insight:  good  Judgment:  intact, adequate for safety  Impulse Control:  intact        DIAGNOSES:   Major Depressive Disorder, moderate, recurrent  SIADH/hyponatremia      ASSESSMENT:   Patient with history of depression, and considerable mood difficulties without prior combo of citalopram/bupropion affecting relationships negatively. Has comorbid diagnoses including SIADH/hyponatermia (possibly worse with selective serotonin reuptake inhibitor) historically, DM, obstructive sleep apnea, b12 deficiency.     Per literature review mirtazapine is less likely to cause hyponatremia, and for current eGFR no dosage necessary. Will pursue.    Today Steve Morgan reports no suicidal ideations. In addition, he has notable risk factors for self-harm, including age and comorbid medical conditions. However, risk is mitigated by commitment to family, Hinduism beliefs and absence of past attempts. Therefore, based on all available evidence including the factors cited above, he does not appear to be at imminent risk for self-harm, does not meet criteria for a 72-hr hold, and therefore remains appropriate for ongoing outpatient level of care.       PLAN:     Patient advised of consultative model. Patient will continue to be seen for ongoing consultation and stabilization.  Iterated consult model  Does not meet criteria for involuntary treatment or hospitalization  Add mirtazapine 5/25/23 7.5 mg po at bedtime x 3 nights then 15 mg po at bedtime slight benefit for anger=> 7/27/23 30 mg at bedtime efficacy partially, difficulty with irritability => 11/16/23 45 mg po at bedtime partial benefits => 2/27/24 60 mg at bedtime no effects-Risks, benefits and alternatives discussed.  Patient provides verbal consent to treatment.  restart bupropion 11/16/23 xl 150 mg daily-Risks, benefits and alternatives discussed.  Patient provides verbal consent to treatment. With  renal disease avoid 300 mg can consider 200 mg/24 h  Consider buspirone, lower doses  DC'd citalopram (hyponatremia/SIADH)  Labs - reviewed; follow-up CMP in May  Referred for MTM referral, priority on guidance for psychotropic selection and risk of hyponatremia  Therapy with G Errgraciela; switching to long term therapy with ISAIAH Hicks   Anger management course - to start June or July  [facility] to send DBT resources to him  Return in 4-10 weeks    Administrative Billing:   Time spent with patient was greater than 50% of time and/or significant time was spent in counseling and coordination of care regarding above diagnoses and treatment plan. Pre charting time and post charting time/documentation/coordination are done on date of service.      Signed:   Anant Pinto M.D.  [facility] Psychiatry Service    Disclaimer: This note consists of symbols derived from keyboarding, dictation and/or voice recognition software. As a result, there may be errors in the script that have gone undetected. Please consider this when interpreting information found in this chart.    eoc

## 2024-04-16 ENCOUNTER — MYC REFILL (OUTPATIENT)
Dept: PSYCHIATRY | Facility: CLINIC | Age: 64
End: 2024-04-16
Payer: MEDICARE

## 2024-04-16 NOTE — TELEPHONE ENCOUNTER
1) RN received a refill request via Right Fax from    [facility]ACY #[id number] - [place], [place] - [address] for bupropion (Wellbutrin XL) 300 mg tablets     2) This medication was last prescribed on 9/7/2023 for qty of 90 with 0 refill(s).   Discontinued 11/16/2023    3) Per [place] , This medication is not monitored on the .      4) Therefore, RN sent reply back via fax to pharmacy that refill request will be DENIED - medication discontinued    Bisi De RN on 4/16/2024 at 12:12 PM

## 2024-04-19 DIAGNOSIS — K21.9 GASTROESOPHAGEAL REFLUX DISEASE WITHOUT ESOPHAGITIS: ICD-10-CM

## 2024-04-19 RX ORDER — FAMOTIDINE 40 MG/1
40 TABLET, FILM COATED ORAL DAILY
Qty: 90 TABLET | Refills: 1 | Status: SHIPPED | OUTPATIENT
Start: 2024-04-19

## 2024-04-21 DIAGNOSIS — N40.1 BENIGN PROSTATIC HYPERPLASIA WITH URINARY HESITANCY: ICD-10-CM

## 2024-04-21 DIAGNOSIS — Z79.4 TYPE 2 DIABETES MELLITUS WITH DIABETIC NEUROPATHY, WITH LONG-TERM CURRENT USE OF INSULIN (H): ICD-10-CM

## 2024-04-21 DIAGNOSIS — R39.11 BENIGN PROSTATIC HYPERPLASIA WITH URINARY HESITANCY: ICD-10-CM

## 2024-04-21 DIAGNOSIS — E11.40 TYPE 2 DIABETES MELLITUS WITH DIABETIC NEUROPATHY, WITH LONG-TERM CURRENT USE OF INSULIN (H): ICD-10-CM

## 2024-04-21 DIAGNOSIS — R73.9 HYPERGLYCEMIA: ICD-10-CM

## 2024-04-21 DIAGNOSIS — E11.40 TYPE 2 DIABETES MELLITUS WITH DIABETIC NEUROPATHY, WITHOUT LONG-TERM CURRENT USE OF INSULIN (H): ICD-10-CM

## 2024-04-21 DIAGNOSIS — E03.4 HYPOTHYROIDISM DUE TO ACQUIRED ATROPHY OF THYROID: ICD-10-CM

## 2024-04-22 ENCOUNTER — MYC REFILL (OUTPATIENT)
Dept: PSYCHIATRY | Facility: CLINIC | Age: 64
End: 2024-04-22
Payer: MEDICARE

## 2024-04-22 RX ORDER — GLIPIZIDE 10 MG/1
10 TABLET, FILM COATED, EXTENDED RELEASE ORAL DAILY
Qty: 90 TABLET | Refills: 7 | Status: SHIPPED | OUTPATIENT
Start: 2024-04-22

## 2024-04-22 RX ORDER — FINASTERIDE 5 MG/1
1 TABLET, FILM COATED ORAL DAILY
Qty: 90 TABLET | Refills: 1 | Status: ON HOLD | OUTPATIENT
Start: 2024-04-22 | End: 2024-05-18

## 2024-04-22 RX ORDER — LEVOTHYROXINE SODIUM 137 UG/1
137 TABLET ORAL DAILY
Qty: 30 TABLET | Refills: 0 | Status: SHIPPED | OUTPATIENT
Start: 2024-04-22 | End: 2024-06-03

## 2024-04-22 NOTE — TELEPHONE ENCOUNTER
Requested Prescriptions   Pending Prescriptions Disp Refills    metFORMIN (GLUCOPHAGE) 1000 MG tablet [Pharmacy Med Name: METFORMIN HCL 1,000 MG TABLET] 180 tablet 1     Sig: TAKE 1 TABLET BY MOUTH TWICE A DAY WITH MEALS       Biguanide Agents Failed - 4/21/2024 12:47 PM        Failed - Has GFR on file in past 12 months and most recent value is normal        Passed - Patient is age 10 or older        Passed - Patient has documented A1c within the specified period of time.     If HgbA1C is 8 or greater, it needs to be on file within the past 3 months.  If less than 8, must be on file within the past 6 months.     Recent Labs   Lab Test 02/26/24  1145   A1C 7.6*             Passed - Patient does NOT have a diagnosis of CHF.        Passed - Medication is active on med list        Passed - Medication indicated for associated diagnosis     Medication is associated with one or more of the following diagnoses:     Gestational diabetes mellitus     Hyperinsulinar obesity     Hypersecretion of ovarian androgens    Non-alcoholic fatty liver    Polycystic ovarian syndrome               Pre-diabetes (DM 2 prevention)    Type 2 diabetes mellitus     Weight gain, antipsychotic therapy-induced             Passed - Recent (6 mo) or future (90 days) visit within the authorizing provider's specialty     The patient must have completed an in-person or virtual visit within the past 6 months or has a future visit scheduled within the next 90 days with the authorizing provider s specialty.  Urgent care and e-visits do not quality as an office visit for this protocol.            LEVOXYL 137 MCG tablet [Pharmacy Med Name: LEVOXYL 137 MCG TABLET] 90 tablet      Sig: TAKE 1 TABLET BY MOUTH EVERY DAY       Thyroid Protocol Failed - 4/21/2024 12:47 PM        Failed - Medication indicated for associated diagnosis     Medication is associated with one or more of the following diagnoses:  Hypothyroidism  Thyroid stimulating hormone  suppression therapy  Thyroid cancer          Failed - Normal TSH on file in past 12 months     Recent Labs   Lab Test 02/26/24  1145   TSH 12.70*              Passed - Patient is 12 years or older        Passed - Recent (12 mo) or future (30 days) visit within the authorizing provider's specialty     The patient must have completed an in-person or virtual visit within the past 12 months or has a future visit scheduled within the next 90 days with the authorizing provider s specialty.  Urgent care and e-visits do not quality as an office visit for this protocol.          Passed - Medication is active on med list

## 2024-04-22 NOTE — TELEPHONE ENCOUNTER
1) RN received a refill request via Right Fax from    [facility]/PHARMACY #[id number] - [place], [place] - [address]  for mirtazapine (Remeron) 30 mg - Take 1 tablet by mouth at bedtime - last prescribed 9/7/2023 and filled on 9/25/2023.      2) This medication was last prescribed on 9/7/2023 for qty of 30 with 3 refill(s). - discontinued 11/16/2023    3) Per 4/10/2024 treatment plan-  Add mirtazapine 5/25/23 7.5 mg po at bedtime x 3 nights then 15 mg po at bedtime slight benefit for anger=> 7/27/23 30 mg at bedtime efficacy partially, difficulty with irritability => 11/16/23 45 mg po at bedtime partial benefits => 2/27/24 60 mg at bedtime no effects-Risks, benefits and alternatives discussed.  Patient provides verbal consent to treatment.    4) Therefore, RN sent reply back to pharmacy via fax that refill request will be DENIED d/t 30 mg dose is discontinued. Patient now takes 60 mg at bedtime 45 + 15 mg dose.    Bisi De RN on 4/22/2024 at 1:30 PM

## 2024-04-26 ENCOUNTER — DOCUMENTATION ONLY (OUTPATIENT)
Dept: ENDOCRINOLOGY | Facility: CLINIC | Age: 64
End: 2024-04-26
Payer: MEDICARE

## 2024-04-26 DIAGNOSIS — E03.4 HYPOTHYROIDISM DUE TO ACQUIRED ATROPHY OF THYROID: Primary | ICD-10-CM

## 2024-04-26 NOTE — PROGRESS NOTES
Steve Morgan has an upcoming lab appointment and is requesting thyroid labs to be drawn for Dr. Meraz. Currently there are no orders for this. Please review.    Future Appointments   Date Time Provider Department Center   5/1/2024 10:30 AM Justina Hicks LICSW [facility]   5/14/2024  1:00 PM CR LAB CRLABR CR   5/16/2024 10:30 AM Justina Hicks LICSW [facility]   5/21/2024 11:00 AM [facility]   6/18/2024  1:30 PM Catia An LICSW [facility]   6/18/2024  2:00 PM Anant Pinto MD [facility]       Thank You,    Misti Lim  [facility]  [facility]  P: [phone]

## 2024-05-01 ENCOUNTER — VIRTUAL VISIT (OUTPATIENT)
Dept: PSYCHOLOGY | Facility: CLINIC | Age: 64
End: 2024-05-01
Payer: MEDICARE

## 2024-05-01 DIAGNOSIS — F33.1 MAJOR DEPRESSIVE DISORDER, RECURRENT EPISODE, MODERATE (H): Primary | ICD-10-CM

## 2024-05-01 PROCEDURE — 90834 PSYTX W PT 45 MINUTES: CPT | Mod: 95

## 2024-05-01 NOTE — PROGRESS NOTES
[facility]                                     Progress Note    Patient Name: Steve Morgan  Date: 5/1/2024         Service Type: Individual      Session Start Time: 10:33 AM  Session End Time: 11:20 AM     Session Length: 47 Minutes    Session #: 3    Attendees: Client attended alone    Service Modality:  Video Visit:      Provider verified identity through the following two step process.  Patient provided:  Patient is known previously to provider    Telemedicine Visit: The patient's condition can be safely assessed and treated via synchronous audio and visual telemedicine encounter.      Reason for Telemedicine Visit: Patient has requested telehealth visit and Services only offered telehealth    Originating Site (Patient Location): Patient's home    Distant Site (Provider Location): Provider Remote Setting- Home Office    Consent:  The patient/guardian has verbally consented to: the potential risks and benefits of telemedicine (video visit) versus in person care; bill my insurance or make self-payment for services provided; and responsibility for payment of non-covered services.     Patient would like the video invitation sent by:  My Chart    Mode of Communication:  [facility] Conference [facility]    Distant Location (Provider):  Off-site    As the provider I attest to compliance with applicable laws and regulations related to telemedicine.    DATA  Interactive Complexity: No  Crisis: No        Progress Since Last Session (Related to Symptoms / Goals / Homework):   Symptoms: No change continued low mood, irritation and negative self talk    Homework: Partially completed      Episode of Care Goals: No improvement - PREPARATION (Decided to change - considering how); Intervened by negotiating a change plan and determining options / strategies for behavior change, identifying triggers, exploring social supports, and working towards setting a date to begin behavior change     Current / Ongoing  Stressors and Concerns:   Caio is coming to therapy to learn skills to cope with his anxiety, stress and anger. He reports escalating behaviors when becoming angry with others that often gets in the way of him communication with his family. He reports the pattern of his behaviors has broken down his overall communication with his wife and feeling connected to her. DA was completed by SAURABH Herron. Caio will complete the forms to sign up for [facility] Anger Management class. He will discuss couple's counseling with his wife.      Treatment Objective(s) Addressed in This Session:   use progressive relaxation exercise once in the morning and once in the evening to help relieve tension       Intervention:   Therapist utilized reflected listening as patient gave brief reflection of the past few weeks. Patient reported continued low mood, irritation and negative self talk. He processed his feelings and thoughts towards his son's partner. Therapist supported patient as he processed and validated patient. Therapist engaged in guided discovery, explored patient's perspectives and helped expand them through Socratic dialogue.    Assessments completed prior to visit:  The following assessments were completed by patient for this visit:  PHQ2:       11/28/2023    10:11 AM 3/10/2023     1:53 PM 6/12/2022     4:29 PM 3/28/2022    10:47 AM 11/16/2021    10:14 AM 9/23/2021    10:32 AM 9/2/2020     9:26 AM   PHQ-2 ( 1999 [employer])   Q1: Little interest or pleasure in doing things 1  3 0 1 3 3   Q2: Feeling down, depressed or hopeless 1  1 0 1 1 3   PHQ-2 Score 2  4 0 2 4 6   PHQ-2 Total Score (12-17 Years)- Positive if 3 or more points; Administer PHQ-A if positive      4 6   Q1: Little interest or pleasure in doing things   Nearly every day  Several days Nearly every day Nearly every day   Q2: Feeling down, depressed or hopeless   Several days  Several days Several days Nearly every day   PHQ-2 Score   Incomplete 4  2 4 6     PHQ9:       11/16/2023    10:08 AM 11/30/2023     1:00 PM 1/11/2024     9:12 AM 2/26/2024     5:20 PM 3/12/2024    10:23 AM 4/10/2024     2:12 PM 5/1/2024    10:17 AM   PHQ-9 SCORE   PHQ-9 Total Score MyChart 8 (Mild depression)  8 (Mild depression) 15 (Moderately severe depression) 8 (Mild depression) 17 (Moderately severe depression) 9 (Mild depression)   PHQ-9 Total Score 8 8 8    8 15 8 17 9     GAD2:       12/1/2022    11:36 AM 5/15/2023    10:41 AM 5/21/2023     4:33 PM 8/23/2023     2:14 PM 11/16/2023    10:08 AM 2/26/2024     5:22 PM   JOSE MANUEL-2   Feeling nervous, anxious, or on edge 1 1 0 1 0 1   Not being able to stop or control worrying 1 0 0 0 0 0   JOSE MANUEL-2 Total Score 2 1 0 1 0 1    1     GAD7:       11/16/2021    10:11 AM 10/17/2022    10:38 AM 12/20/2022     9:43 AM 1/27/2023    10:03 AM 3/10/2023     1:48 PM 6/30/2023     8:21 AM 3/12/2024    10:24 AM   JOSE MANUEL-7 SCORE   Total Score 3 (minimal anxiety)  2 (minimal anxiety) 2 (minimal anxiety) 5 (mild anxiety) 3 (minimal anxiety) 6 (mild anxiety)   Total Score 3 6 2 2 5 3 6     PROMIS 10-Global Health (all questions and answers displayed):       5/15/2023    10:44 AM 5/21/2023     4:35 PM 8/23/2023     2:16 PM 11/16/2023    10:10 AM 12/1/2023     4:00 PM 3/12/2024    10:26 AM 4/10/2024     2:15 PM   PROMIS 10   In general, would you say your health is: Fair Fair Fair Good  Fair Fair   In general, would you say your quality of life is: Good Very good Good Good  Good Good   In general, how would you rate your physical health? Fair Poor Poor Fair  Fair Poor   In general, how would you rate your mental health, including your mood and your ability to think? Fair Fair Fair Fair  Poor Fair   In general, how would you rate your satisfaction with your social activities and relationships? Fair Fair Good Fair  Poor Fair   In general, please rate how well you carry out your usual social activities and roles Good Fair Fair Good  Fair Fair   To what  extent are you able to carry out your everyday physical activities such as walking, climbing stairs, carrying groceries, or moving a chair? Moderately Mostly Completely Moderately  Not at all Moderately   In the past 7 days, how often have you been bothered by emotional problems such as feeling anxious, depressed, or irritable? Often Often Sometimes Often  Often Sometimes   In the past 7 days, how would you rate your fatigue on average? Moderate Moderate Mild Moderate  Moderate Mild   In the past 7 days, how would you rate your pain on average, where 0 means no pain, and 10 means worst imaginable pain? 1 0 0 1  0 0   In general, would you say your health is: 2 2 2 3  2 2    2   In general, would you say your quality of life is: 3 4 3 3  3 3    3   In general, how would you rate your physical health? 2 1 1 2  2 1    1   In general, how would you rate your mental health, including your mood and your ability to think? 2 2 2 2  1 2    2   In general, how would you rate your satisfaction with your social activities and relationships? 2 2 3 2  1 2    2   In general, please rate how well you carry out your usual social activities and roles. (This includes activities at home, at work and in your community, and responsibilities as a parent, child, spouse, employee, friend, etc.) 3 2 2 3  2 2    2   To what extent are you able to carry out your everyday physical activities such as walking, climbing stairs, carrying groceries, or moving a chair? 3 4 5 3  1 3    3   In the past 7 days, how often have you been bothered by emotional problems such as feeling anxious, depressed, or irritable? 4 4 3 4  4 3    3   In the past 7 days, how would you rate your fatigue on average? 3 3 2 3  3 2    2   In the past 7 days, how would you rate your pain on average, where 0 means no pain, and 10 means worst imaginable pain? 1 0 0 1  0 0    0   Global Mental Health Score 9 10 11 9  7 10    10   Global Physical Health Score 12 13 15 12  11 13     13   PROMIS TOTAL - SUBSCORES 21 23 26 21  18 23    23       Information is confidential and restricted. Go to Review Flowsheets to unlock data.    Multiple values from one day are sorted in reverse-chronological order         ASSESSMENT: Current Emotional / Mental Status (status of significant symptoms):   Risk status (Self / Other harm or suicidal ideation)   Patient denies current fears or concerns for personal safety.   Patient denies current or recent suicidal ideation or behaviors.   Patient denies current or recent homicidal ideation or behaviors.   Patient denies current or recent self injurious behavior or ideation.   Patient denies other safety concerns.   Patient reports there has been no change in risk factors since their last session.     Patient reports there has been no change in protective factors since their last session.     Recommended that patient call 911 or go to the local ED should there be a change in any of these risk factors.     Appearance:   Appropriate    Eye Contact:   Good    Psychomotor Behavior: Normal    Attitude:   Cooperative    Orientation:   All   Speech    Rate / Production: Normal     Volume:  Normal    Mood:    Depressed  Irritable    Affect:    Appropriate  Subdued    Thought Content:  Clear    Thought Form:  Coherent  Goal Directed  Logical    Insight:    Good  and Fair      Medication Review:   No changes to current psychiatric medication(s)     Medication Compliance:   Yes     Changes in Health Issues:   None reported     Chemical Use Review:   Substance Use: Chemical use reviewed, no active concerns identified      Tobacco Use: No current tobacco use.      Diagnosis:  1. Major depressive disorder, recurrent episode, moderate (H)        Collateral Reports Completed:   Not Applicable    PLAN: (Patient Tasks / Therapist Tasks / Other)  Caio will initiate talking to his son's girlfriend and set up a dinner with all 4 of them.  He will fill out the forms to [employer]  [facility] Anger Management class.   Therapist will coordinate with Behavioral Health Intake to have Caio scheduled with SAURABH Smith.     SAURABH Fernandes      _________________________________________________________________                                          Individual Treatment Plan    Patient's Name: Steve Morgan  YOB: 1960    Date of Creation: 5/1/2024  Date Treatment Plan Last Reviewed/Revised: n/a    DSM5 Diagnoses: 296.32 (F33.1) Major Depressive Disorder, Recurrent Episode, Moderate _  Psychosocial / Contextual Factors: 64yr male on disability, stressors at home  PROMIS (reviewed every 90 days):     Referral / Collaboration:  Referral to another professional/service is not indicated at this time..    Anticipated number of session for this episode of care: 9-12 sessions  Anticipation frequency of session: Every other week  Anticipated Duration of each session: 38-52 minutes  Treatment plan will be reviewed in 90 days or when goals have been changed.       MeasurableTreatment Goal(s) related to diagnosis / functional impairment(s)  Goal 1: Patient will become more aware of their depression and utilize the skills taught to decrease their depressive symptoms.    I will know I've met my goal when I feel better, can communicate with others and can address my irritation and anger.      Objective #A  Patient will Decrease frequency and intensity of feeling down, depressed, hopeless.  Status: New - Date: 5/1/2024      Intervention(s)  Therapist will  provide educational materials on core beliefs, cognitive distortions, teach emotional regulation skills, including accepting emotions and thoughts. .    Objective #B  Patient will Identify negative self-talk and behaviors: challenge core beliefs, myths, and actions.  Status: New - Date: 5/1/2024      Intervention(s)  Therapist will  teach cognitive behavioral skills and engage client in Socratic dialogue around distorted thinking.   Therapist will provide educational materials on CBT .    Objective #C  Patient will  Patient will implement self-care into their routine to decrease anxiety, focusing on sleep hygiene, nutrition, movement, regular engagement in mindful activity, and regular socialization.  .  Status: New - Date: 5/1/2024      Intervention(s)  Therapist will  provide psychoeducation around the benefit of self-care and help client identify mindfulness based activities that may work for their life. .      Patient has reviewed and agreed to the above plan.      SAURABH Fernandes  May 1, 2024

## 2024-05-10 DIAGNOSIS — E03.4 HYPOTHYROIDISM DUE TO ACQUIRED ATROPHY OF THYROID: ICD-10-CM

## 2024-05-10 RX ORDER — LEVOTHYROXINE SODIUM 137 UG/1
137 TABLET ORAL DAILY
Qty: 30 TABLET | Refills: 0 | OUTPATIENT
Start: 2024-05-10

## 2024-05-13 ENCOUNTER — TELEPHONE (OUTPATIENT)
Dept: BEHAVIORAL HEALTH | Facility: CLINIC | Age: 64
End: 2024-05-13
Payer: MEDICARE

## 2024-05-13 PROCEDURE — 99207 PR NO BILLABLE SERVICE THIS VISIT: CPT | Performed by: COUNSELOR

## 2024-05-13 NOTE — TELEPHONE ENCOUNTER
[facility] Phone Encounter    Encounter Activities (Refresh/Reselect every encounter): [facility] Only  Type of Contact Today: Phone call (patient / identified key support person reached)  Date of phone call: May 13, 2024                   Presenting Issue: returning pt's call   [facility] Patient: No    [facility] contacts Caio to let him know that [facility] isn't sure on the billing code that is used to bill for DBT and instructs him to contact the agencies to see if they can provide him with a billing code.     Pt says that he isn't able to see the message that [facility] sent with the list of DBT agencies. [facility] will mail this information to pt.     Pt says that he doesn't   [facility] also informs pt that his endocrinology team sent a message as well to upload his glucose numbers.           Safety Concerns:  Risk status (Self / Other harm or suicidal ideation)  Patient denies current fears or concerns for personal safety.  Patient denies current or recent suicidal ideation or behaviors.  Patient denies current or recent homicidal ideation or behaviors.  Patient denies current or recent self injurious behavior or ideation.  Patient denies other safety concerns.    Disposition:   Recommended that patient call 911 or go to the local ED should there be a change in any of these risk factors.    [facility] will mail the list of DBT agencies.     SAURABH Andujar

## 2024-05-14 ENCOUNTER — LAB (OUTPATIENT)
Dept: LAB | Facility: CLINIC | Age: 64
End: 2024-05-14
Payer: MEDICARE

## 2024-05-14 DIAGNOSIS — F33.0 MILD EPISODE OF RECURRENT MAJOR DEPRESSIVE DISORDER (H): Primary | ICD-10-CM

## 2024-05-14 DIAGNOSIS — E03.4 HYPOTHYROIDISM DUE TO ACQUIRED ATROPHY OF THYROID: ICD-10-CM

## 2024-05-14 DIAGNOSIS — N18.30 STAGE 3 CHRONIC KIDNEY DISEASE, UNSPECIFIED WHETHER STAGE 3A OR 3B CKD (H): ICD-10-CM

## 2024-05-14 DIAGNOSIS — F33.1 MAJOR DEPRESSIVE DISORDER, RECURRENT EPISODE, MODERATE (H): ICD-10-CM

## 2024-05-14 DIAGNOSIS — R80.9 PROTEINURIA: ICD-10-CM

## 2024-05-14 DIAGNOSIS — F33.1 MODERATE EPISODE OF RECURRENT MAJOR DEPRESSIVE DISORDER (H): ICD-10-CM

## 2024-05-14 DIAGNOSIS — E11.40 TYPE 2 DIABETES MELLITUS WITH DIABETIC NEUROPATHY (H): Primary | ICD-10-CM

## 2024-05-14 LAB
ALBUMIN SERPL BCG-MCNC: 4.3 G/DL (ref 3.5–5.2)
ALP SERPL-CCNC: 72 U/L (ref 40–150)
ALT SERPL W P-5'-P-CCNC: 27 U/L (ref 0–70)
ANION GAP SERPL CALCULATED.3IONS-SCNC: 12 MMOL/L (ref 7–15)
AST SERPL W P-5'-P-CCNC: 25 U/L (ref 0–45)
BILIRUB SERPL-MCNC: 0.2 MG/DL
BUN SERPL-MCNC: 30.9 MG/DL (ref 8–23)
CALCIUM SERPL-MCNC: 9.7 MG/DL (ref 8.8–10.2)
CHLORIDE SERPL-SCNC: 98 MMOL/L (ref 98–107)
CREAT SERPL-MCNC: 1.84 MG/DL (ref 0.67–1.17)
DEPRECATED HCO3 PLAS-SCNC: 22 MMOL/L (ref 22–29)
EGFRCR SERPLBLD CKD-EPI 2021: 40 ML/MIN/1.73M2
GLUCOSE SERPL-MCNC: 221 MG/DL (ref 70–99)
HBA1C MFR BLD: 8 % (ref 0–5.6)
POTASSIUM SERPL-SCNC: 5 MMOL/L (ref 3.4–5.3)
PROT SERPL-MCNC: 7.3 G/DL (ref 6.4–8.3)
SODIUM SERPL-SCNC: 132 MMOL/L (ref 135–145)
T4 FREE SERPL-MCNC: 1.45 NG/DL (ref 0.9–1.7)
TSH SERPL DL<=0.005 MIU/L-ACNC: 4.08 UIU/ML (ref 0.3–4.2)

## 2024-05-14 PROCEDURE — 84443 ASSAY THYROID STIM HORMONE: CPT

## 2024-05-14 PROCEDURE — 83036 HEMOGLOBIN GLYCOSYLATED A1C: CPT

## 2024-05-14 PROCEDURE — 36415 COLL VENOUS BLD VENIPUNCTURE: CPT

## 2024-05-14 PROCEDURE — 80053 COMPREHEN METABOLIC PANEL: CPT

## 2024-05-14 PROCEDURE — 84439 ASSAY OF FREE THYROXINE: CPT

## 2024-05-15 DIAGNOSIS — N18.32 STAGE 3B CHRONIC KIDNEY DISEASE (H): Primary | ICD-10-CM

## 2024-05-16 PROCEDURE — 82043 UR ALBUMIN QUANTITATIVE: CPT

## 2024-05-16 PROCEDURE — 82570 ASSAY OF URINE CREATININE: CPT

## 2024-05-17 ENCOUNTER — TELEPHONE (OUTPATIENT)
Dept: ENDOCRINOLOGY | Facility: CLINIC | Age: 64
End: 2024-05-17
Payer: MEDICARE

## 2024-05-17 ENCOUNTER — HOSPITAL ENCOUNTER (INPATIENT)
Facility: CLINIC | Age: 64
LOS: 5 days | Discharge: HOME | DRG: 501 | End: 2024-05-22
Attending: EMERGENCY MEDICINE | Admitting: INTERNAL MEDICINE
Payer: MEDICARE

## 2024-05-17 ENCOUNTER — APPOINTMENT (OUTPATIENT)
Dept: GENERAL RADIOLOGY | Facility: CLINIC | Age: 64
DRG: 501 | End: 2024-05-17
Attending: EMERGENCY MEDICINE
Payer: MEDICARE

## 2024-05-17 DIAGNOSIS — M71.161 SEPTIC PREPATELLAR BURSITIS, RIGHT: ICD-10-CM

## 2024-05-17 DIAGNOSIS — L03.115 CELLULITIS OF RIGHT LEG: ICD-10-CM

## 2024-05-17 LAB
ANION GAP SERPL CALCULATED.3IONS-SCNC: 15 MMOL/L (ref 7–15)
BASOPHILS # BLD AUTO: 0 10E3/UL (ref 0–0.2)
BASOPHILS NFR BLD AUTO: 0 %
BUN SERPL-MCNC: 24.3 MG/DL (ref 8–23)
CALCIUM SERPL-MCNC: 9.7 MG/DL (ref 8.8–10.2)
CHLORIDE SERPL-SCNC: 95 MMOL/L (ref 98–107)
CREAT SERPL-MCNC: 1.74 MG/DL (ref 0.67–1.17)
CREAT UR-MCNC: 37.7 MG/DL
CRP SERPL-MCNC: 254.74 MG/L
DEPRECATED HCO3 PLAS-SCNC: 21 MMOL/L (ref 22–29)
EGFRCR SERPLBLD CKD-EPI 2021: 43 ML/MIN/1.73M2
EOSINOPHIL # BLD AUTO: 0 10E3/UL (ref 0–0.7)
EOSINOPHIL NFR BLD AUTO: 0 %
ERYTHROCYTE [DISTWIDTH] IN BLOOD BY AUTOMATED COUNT: 13.4 % (ref 10–15)
ERYTHROCYTE [SEDIMENTATION RATE] IN BLOOD BY WESTERGREN METHOD: 62 MM/HR (ref 0–20)
GLUCOSE BLDC GLUCOMTR-MCNC: 189 MG/DL (ref 70–99)
GLUCOSE SERPL-MCNC: 234 MG/DL (ref 70–99)
HCT VFR BLD AUTO: 30.5 % (ref 40–53)
HGB BLD-MCNC: 10.5 G/DL (ref 13.3–17.7)
HOLD SPECIMEN: NORMAL
IMM GRANULOCYTES # BLD: 0.1 10E3/UL
IMM GRANULOCYTES NFR BLD: 1 %
LACTATE SERPL-SCNC: 0.9 MMOL/L (ref 0.7–2)
LYMPHOCYTES # BLD AUTO: 0.8 10E3/UL (ref 0.8–5.3)
LYMPHOCYTES NFR BLD AUTO: 5 %
MCH RBC QN AUTO: 30.3 PG (ref 26.5–33)
MCHC RBC AUTO-ENTMCNC: 34.4 G/DL (ref 31.5–36.5)
MCV RBC AUTO: 88 FL (ref 78–100)
MICROALBUMIN UR-MCNC: 57.3 MG/L
MICROALBUMIN/CREAT UR: 151.99 MG/G CR (ref 0–17)
MONOCYTES # BLD AUTO: 1.3 10E3/UL (ref 0–1.3)
MONOCYTES NFR BLD AUTO: 8 %
NEUTROPHILS # BLD AUTO: 13.5 10E3/UL (ref 1.6–8.3)
NEUTROPHILS NFR BLD AUTO: 86 %
NRBC # BLD AUTO: 0 10E3/UL
NRBC BLD AUTO-RTO: 0 /100
PLATELET # BLD AUTO: 291 10E3/UL (ref 150–450)
POTASSIUM SERPL-SCNC: 4.5 MMOL/L (ref 3.4–5.3)
RBC # BLD AUTO: 3.47 10E6/UL (ref 4.4–5.9)
SODIUM SERPL-SCNC: 131 MMOL/L (ref 135–145)
WBC # BLD AUTO: 15.7 10E3/UL (ref 4–11)

## 2024-05-17 PROCEDURE — 36415 COLL VENOUS BLD VENIPUNCTURE: CPT | Performed by: SOCIAL WORKER

## 2024-05-17 PROCEDURE — 89050 BODY FLUID CELL COUNT: CPT | Performed by: EMERGENCY MEDICINE

## 2024-05-17 PROCEDURE — 85025 COMPLETE CBC W/AUTO DIFF WBC: CPT | Performed by: SOCIAL WORKER

## 2024-05-17 PROCEDURE — 96361 HYDRATE IV INFUSION ADD-ON: CPT

## 2024-05-17 PROCEDURE — 0M9N3ZX DRAINAGE OF RIGHT KNEE BURSA AND LIGAMENT, PERCUTANEOUS APPROACH, DIAGNOSTIC: ICD-10-PCS | Performed by: EMERGENCY MEDICINE

## 2024-05-17 PROCEDURE — 258N000003 HC RX IP 258 OP 636: Performed by: EMERGENCY MEDICINE

## 2024-05-17 PROCEDURE — 83605 ASSAY OF LACTIC ACID: CPT | Performed by: EMERGENCY MEDICINE

## 2024-05-17 PROCEDURE — 36415 COLL VENOUS BLD VENIPUNCTURE: CPT | Performed by: EMERGENCY MEDICINE

## 2024-05-17 PROCEDURE — 87205 SMEAR GRAM STAIN: CPT | Performed by: EMERGENCY MEDICINE

## 2024-05-17 PROCEDURE — 99223 1ST HOSP IP/OBS HIGH 75: CPT | Mod: AI | Performed by: INTERNAL MEDICINE

## 2024-05-17 PROCEDURE — 82962 GLUCOSE BLOOD TEST: CPT

## 2024-05-17 PROCEDURE — 86140 C-REACTIVE PROTEIN: CPT | Performed by: EMERGENCY MEDICINE

## 2024-05-17 PROCEDURE — 85652 RBC SED RATE AUTOMATED: CPT | Performed by: EMERGENCY MEDICINE

## 2024-05-17 PROCEDURE — 96365 THER/PROPH/DIAG IV INF INIT: CPT

## 2024-05-17 PROCEDURE — 250N000011 HC RX IP 250 OP 636: Performed by: EMERGENCY MEDICINE

## 2024-05-17 PROCEDURE — 85025 COMPLETE CBC W/AUTO DIFF WBC: CPT | Performed by: EMERGENCY MEDICINE

## 2024-05-17 PROCEDURE — 96375 TX/PRO/DX INJ NEW DRUG ADDON: CPT

## 2024-05-17 PROCEDURE — 73562 X-RAY EXAM OF KNEE 3: CPT | Mod: RT

## 2024-05-17 PROCEDURE — 87040 BLOOD CULTURE FOR BACTERIA: CPT | Performed by: EMERGENCY MEDICINE

## 2024-05-17 PROCEDURE — 99285 EMERGENCY DEPT VISIT HI MDM: CPT | Mod: 25

## 2024-05-17 PROCEDURE — 80048 BASIC METABOLIC PNL TOTAL CA: CPT | Performed by: SOCIAL WORKER

## 2024-05-17 PROCEDURE — 120N000001 HC R&B MED SURG/OB

## 2024-05-17 PROCEDURE — 80048 BASIC METABOLIC PNL TOTAL CA: CPT | Performed by: EMERGENCY MEDICINE

## 2024-05-17 RX ORDER — CEFTRIAXONE 1 G/1
1 INJECTION, POWDER, FOR SOLUTION INTRAMUSCULAR; INTRAVENOUS ONCE
Status: COMPLETED | OUTPATIENT
Start: 2024-05-17 | End: 2024-05-18

## 2024-05-17 RX ORDER — ATENOLOL 25 MG/1
25 TABLET ORAL DAILY
Status: DISCONTINUED | OUTPATIENT
Start: 2024-05-18 | End: 2024-05-22 | Stop reason: HOSPADM

## 2024-05-17 RX ORDER — LATANOPROST 50 UG/ML
1 SOLUTION/ DROPS OPHTHALMIC AT BEDTIME
Status: DISCONTINUED | OUTPATIENT
Start: 2024-05-17 | End: 2024-05-22 | Stop reason: HOSPADM

## 2024-05-17 RX ORDER — ACETAMINOPHEN 325 MG/1
650 TABLET ORAL EVERY 4 HOURS PRN
Status: DISCONTINUED | OUTPATIENT
Start: 2024-05-17 | End: 2024-05-20

## 2024-05-17 RX ORDER — CALCIUM CARBONATE 500 MG/1
1000 TABLET, CHEWABLE ORAL 4 TIMES DAILY PRN
Status: DISCONTINUED | OUTPATIENT
Start: 2024-05-17 | End: 2024-05-22 | Stop reason: HOSPADM

## 2024-05-17 RX ORDER — BUPROPION HYDROCHLORIDE 150 MG/1
150 TABLET ORAL EVERY MORNING
Status: DISCONTINUED | OUTPATIENT
Start: 2024-05-18 | End: 2024-05-22 | Stop reason: HOSPADM

## 2024-05-17 RX ORDER — DEXTROSE MONOHYDRATE 25 G/50ML
25-50 INJECTION, SOLUTION INTRAVENOUS
Status: DISCONTINUED | OUTPATIENT
Start: 2024-05-17 | End: 2024-05-17

## 2024-05-17 RX ORDER — FINASTERIDE 5 MG/1
5 TABLET, FILM COATED ORAL DAILY
Status: DISCONTINUED | OUTPATIENT
Start: 2024-05-18 | End: 2024-05-22 | Stop reason: HOSPADM

## 2024-05-17 RX ORDER — CEFTRIAXONE 1 G/1
1 INJECTION, POWDER, FOR SOLUTION INTRAMUSCULAR; INTRAVENOUS ONCE
Status: COMPLETED | OUTPATIENT
Start: 2024-05-17 | End: 2024-05-17

## 2024-05-17 RX ORDER — SODIUM CHLORIDE, SODIUM LACTATE, POTASSIUM CHLORIDE, CALCIUM CHLORIDE 600; 310; 30; 20 MG/100ML; MG/100ML; MG/100ML; MG/100ML
INJECTION, SOLUTION INTRAVENOUS CONTINUOUS
Status: DISCONTINUED | OUTPATIENT
Start: 2024-05-17 | End: 2024-05-18

## 2024-05-17 RX ORDER — NICOTINE POLACRILEX 4 MG
15-30 LOZENGE BUCCAL
Status: DISCONTINUED | OUTPATIENT
Start: 2024-05-17 | End: 2024-05-22 | Stop reason: HOSPADM

## 2024-05-17 RX ORDER — ACETAMINOPHEN 650 MG/1
650 SUPPOSITORY RECTAL EVERY 4 HOURS PRN
Status: DISCONTINUED | OUTPATIENT
Start: 2024-05-17 | End: 2024-05-22 | Stop reason: HOSPADM

## 2024-05-17 RX ORDER — ONDANSETRON 4 MG/1
4 TABLET, ORALLY DISINTEGRATING ORAL EVERY 6 HOURS PRN
Status: DISCONTINUED | OUTPATIENT
Start: 2024-05-17 | End: 2024-05-22 | Stop reason: HOSPADM

## 2024-05-17 RX ORDER — MORPHINE SULFATE 4 MG/ML
4 INJECTION, SOLUTION INTRAMUSCULAR; INTRAVENOUS ONCE
Status: COMPLETED | OUTPATIENT
Start: 2024-05-17 | End: 2024-05-17

## 2024-05-17 RX ORDER — DEXTROSE MONOHYDRATE 25 G/50ML
25-50 INJECTION, SOLUTION INTRAVENOUS
Status: DISCONTINUED | OUTPATIENT
Start: 2024-05-17 | End: 2024-05-22 | Stop reason: HOSPADM

## 2024-05-17 RX ORDER — NICOTINE POLACRILEX 4 MG
15-30 LOZENGE BUCCAL
Status: DISCONTINUED | OUTPATIENT
Start: 2024-05-17 | End: 2024-05-17

## 2024-05-17 RX ORDER — AMOXICILLIN 250 MG
2 CAPSULE ORAL 2 TIMES DAILY PRN
Status: DISCONTINUED | OUTPATIENT
Start: 2024-05-17 | End: 2024-05-22 | Stop reason: HOSPADM

## 2024-05-17 RX ORDER — CEFTRIAXONE 2 G/1
2 INJECTION, POWDER, FOR SOLUTION INTRAMUSCULAR; INTRAVENOUS EVERY 24 HOURS
Status: DISCONTINUED | OUTPATIENT
Start: 2024-05-18 | End: 2024-05-18

## 2024-05-17 RX ORDER — HYDROMORPHONE HYDROCHLORIDE 2 MG/1
2 TABLET ORAL
Status: DISCONTINUED | OUTPATIENT
Start: 2024-05-17 | End: 2024-05-19

## 2024-05-17 RX ORDER — AMOXICILLIN 250 MG
1 CAPSULE ORAL 2 TIMES DAILY PRN
Status: DISCONTINUED | OUTPATIENT
Start: 2024-05-17 | End: 2024-05-22 | Stop reason: HOSPADM

## 2024-05-17 RX ORDER — HYDROMORPHONE HYDROCHLORIDE 2 MG/1
4 TABLET ORAL
Status: DISCONTINUED | OUTPATIENT
Start: 2024-05-17 | End: 2024-05-19

## 2024-05-17 RX ORDER — ONDANSETRON 2 MG/ML
4 INJECTION INTRAMUSCULAR; INTRAVENOUS EVERY 6 HOURS PRN
Status: DISCONTINUED | OUTPATIENT
Start: 2024-05-17 | End: 2024-05-22 | Stop reason: HOSPADM

## 2024-05-17 RX ADMIN — CEFTRIAXONE 1 G: 1 INJECTION, POWDER, FOR SOLUTION INTRAMUSCULAR; INTRAVENOUS at 21:23

## 2024-05-17 RX ADMIN — SODIUM CHLORIDE 1000 ML: 9 INJECTION, SOLUTION INTRAVENOUS at 20:05

## 2024-05-17 RX ADMIN — VANCOMYCIN HYDROCHLORIDE 1750 MG: 10 INJECTION, POWDER, LYOPHILIZED, FOR SOLUTION INTRAVENOUS at 22:54

## 2024-05-17 RX ADMIN — MORPHINE SULFATE 4 MG: 4 INJECTION, SOLUTION INTRAMUSCULAR; INTRAVENOUS at 21:22

## 2024-05-17 ASSESSMENT — ACTIVITIES OF DAILY LIVING (ADL)
ADLS_ACUITY_SCORE: 37

## 2024-05-17 ASSESSMENT — COLUMBIA-SUICIDE SEVERITY RATING SCALE - C-SSRS
1. IN THE PAST MONTH, HAVE YOU WISHED YOU WERE DEAD OR WISHED YOU COULD GO TO SLEEP AND NOT WAKE UP?: NO
2. HAVE YOU ACTUALLY HAD ANY THOUGHTS OF KILLING YOURSELF IN THE PAST MONTH?: NO
6. HAVE YOU EVER DONE ANYTHING, STARTED TO DO ANYTHING, OR PREPARED TO DO ANYTHING TO END YOUR LIFE?: NO

## 2024-05-17 NOTE — TELEPHONE ENCOUNTER
Called patient and left voicemail. Patient has an appointment on  5/21/24 . Need patient to upload their Nina device to site for provider to review prior to their appointment.  Carmelita Mendoza MA

## 2024-05-17 NOTE — ED TRIAGE NOTES
Right knee pain since wednesday night, is unsure of trauma but has noticed increase in swelling and redness. States increased difficulty in ambulating.      Triage Assessment (Adult)       Row Name 05/17/24 1520          Triage Assessment    Airway WDL WDL        Respiratory WDL    Respiratory WDL WDL        Cardiac WDL    Cardiac WDL WDL

## 2024-05-18 ENCOUNTER — APPOINTMENT (OUTPATIENT)
Dept: PHYSICAL THERAPY | Facility: CLINIC | Age: 64
DRG: 501 | End: 2024-05-18
Attending: INTERNAL MEDICINE
Payer: MEDICARE

## 2024-05-18 LAB
ANION GAP SERPL CALCULATED.3IONS-SCNC: 12 MMOL/L (ref 7–15)
APPEARANCE FLD: ABNORMAL
BUN SERPL-MCNC: 23.6 MG/DL (ref 8–23)
CALCIUM SERPL-MCNC: 8.5 MG/DL (ref 8.8–10.2)
CELL COUNT BODY FLUID SOURCE: ABNORMAL
CHLORIDE SERPL-SCNC: 98 MMOL/L (ref 98–107)
COLOR FLD: YELLOW
CREAT SERPL-MCNC: 1.8 MG/DL (ref 0.67–1.17)
DEPRECATED HCO3 PLAS-SCNC: 20 MMOL/L (ref 22–29)
EGFRCR SERPLBLD CKD-EPI 2021: 41 ML/MIN/1.73M2
ERYTHROCYTE [DISTWIDTH] IN BLOOD BY AUTOMATED COUNT: 13.5 % (ref 10–15)
GLUCOSE BLDC GLUCOMTR-MCNC: 220 MG/DL (ref 70–99)
GLUCOSE BLDC GLUCOMTR-MCNC: 226 MG/DL (ref 70–99)
GLUCOSE BLDC GLUCOMTR-MCNC: 252 MG/DL (ref 70–99)
GLUCOSE BLDC GLUCOMTR-MCNC: 265 MG/DL (ref 70–99)
GLUCOSE BLDC GLUCOMTR-MCNC: 302 MG/DL (ref 70–99)
GLUCOSE BLDC GLUCOMTR-MCNC: 303 MG/DL (ref 70–99)
GLUCOSE BLDC GLUCOMTR-MCNC: 327 MG/DL (ref 70–99)
GLUCOSE SERPL-MCNC: 236 MG/DL (ref 70–99)
HCT VFR BLD AUTO: 26.7 % (ref 40–53)
HGB BLD-MCNC: 9 G/DL (ref 13.3–17.7)
LYMPHOCYTES NFR FLD MANUAL: 0 %
MCH RBC QN AUTO: 29.9 PG (ref 26.5–33)
MCHC RBC AUTO-ENTMCNC: 33.7 G/DL (ref 31.5–36.5)
MCV RBC AUTO: 89 FL (ref 78–100)
MONOS+MACROS NFR FLD MANUAL: 10 %
NEUTS BAND NFR FLD MANUAL: 90 %
PLATELET # BLD AUTO: 241 10E3/UL (ref 150–450)
POTASSIUM SERPL-SCNC: 4.3 MMOL/L (ref 3.4–5.3)
RBC # BLD AUTO: 3.01 10E6/UL (ref 4.4–5.9)
SODIUM SERPL-SCNC: 130 MMOL/L (ref 135–145)
WBC # BLD AUTO: 13.4 10E3/UL (ref 4–11)
WBC # FLD AUTO: ABNORMAL /UL

## 2024-05-18 PROCEDURE — G0378 HOSPITAL OBSERVATION PER HR: HCPCS

## 2024-05-18 PROCEDURE — 250N000013 HC RX MED GY IP 250 OP 250 PS 637: Performed by: INTERNAL MEDICINE

## 2024-05-18 PROCEDURE — 97162 PT EVAL MOD COMPLEX 30 MIN: CPT | Mod: GP

## 2024-05-18 PROCEDURE — 97530 THERAPEUTIC ACTIVITIES: CPT | Mod: GP

## 2024-05-18 PROCEDURE — 80048 BASIC METABOLIC PNL TOTAL CA: CPT | Performed by: INTERNAL MEDICINE

## 2024-05-18 PROCEDURE — 85041 AUTOMATED RBC COUNT: CPT | Performed by: INTERNAL MEDICINE

## 2024-05-18 PROCEDURE — 96376 TX/PRO/DX INJ SAME DRUG ADON: CPT

## 2024-05-18 PROCEDURE — 99233 SBSQ HOSP IP/OBS HIGH 50: CPT | Performed by: INTERNAL MEDICINE

## 2024-05-18 PROCEDURE — 82962 GLUCOSE BLOOD TEST: CPT

## 2024-05-18 PROCEDURE — 120N000001 HC R&B MED SURG/OB

## 2024-05-18 PROCEDURE — 250N000011 HC RX IP 250 OP 636: Performed by: INTERNAL MEDICINE

## 2024-05-18 PROCEDURE — 36415 COLL VENOUS BLD VENIPUNCTURE: CPT | Performed by: INTERNAL MEDICINE

## 2024-05-18 PROCEDURE — 258N000003 HC RX IP 258 OP 636: Performed by: INTERNAL MEDICINE

## 2024-05-18 RX ORDER — ACETAMINOPHEN 500 MG
1000 TABLET ORAL EVERY 6 HOURS PRN
Status: ON HOLD | COMMUNITY
End: 2024-05-21

## 2024-05-18 RX ORDER — FINASTERIDE 5 MG/1
5 TABLET, FILM COATED ORAL AT BEDTIME
COMMUNITY
End: 2024-09-25

## 2024-05-18 RX ORDER — FAMOTIDINE 20 MG/1
40 TABLET, FILM COATED ORAL DAILY
Status: DISCONTINUED | OUTPATIENT
Start: 2024-05-18 | End: 2024-05-20

## 2024-05-18 RX ORDER — SIMVASTATIN 10 MG
20 TABLET ORAL DAILY
Status: DISCONTINUED | OUTPATIENT
Start: 2024-05-18 | End: 2024-05-22 | Stop reason: HOSPADM

## 2024-05-18 RX ORDER — MIRTAZAPINE 15 MG/1
45 TABLET, FILM COATED ORAL AT BEDTIME
Status: DISCONTINUED | OUTPATIENT
Start: 2024-05-18 | End: 2024-05-22 | Stop reason: HOSPADM

## 2024-05-18 RX ORDER — MIRTAZAPINE 15 MG/1
15 TABLET, FILM COATED ORAL AT BEDTIME
Status: DISCONTINUED | OUTPATIENT
Start: 2024-05-18 | End: 2024-05-22 | Stop reason: HOSPADM

## 2024-05-18 RX ORDER — LOSARTAN POTASSIUM 100 MG/1
100 TABLET ORAL DAILY
Status: DISCONTINUED | OUTPATIENT
Start: 2024-05-18 | End: 2024-05-22 | Stop reason: HOSPADM

## 2024-05-18 RX ORDER — SIMVASTATIN 20 MG
20 TABLET ORAL DAILY
COMMUNITY

## 2024-05-18 RX ORDER — GABAPENTIN 300 MG/1
300 CAPSULE ORAL 3 TIMES DAILY
Status: DISCONTINUED | OUTPATIENT
Start: 2024-05-18 | End: 2024-05-19

## 2024-05-18 RX ORDER — FERROUS SULFATE 325(65) MG
325 TABLET ORAL
Status: DISCONTINUED | OUTPATIENT
Start: 2024-05-18 | End: 2024-05-22 | Stop reason: HOSPADM

## 2024-05-18 RX ORDER — INSULIN GLARGINE 100 [IU]/ML
40 INJECTION, SOLUTION SUBCUTANEOUS AT BEDTIME
COMMUNITY
End: 2024-05-24

## 2024-05-18 RX ORDER — VANCOMYCIN HYDROCHLORIDE 1 G/200ML
1000 INJECTION, SOLUTION INTRAVENOUS EVERY 24 HOURS
Status: DISCONTINUED | OUTPATIENT
Start: 2024-05-18 | End: 2024-05-20

## 2024-05-18 RX ADMIN — CEFTRIAXONE 2 G: 2 INJECTION, POWDER, FOR SOLUTION INTRAMUSCULAR; INTRAVENOUS at 08:48

## 2024-05-18 RX ADMIN — METFORMIN HYDROCHLORIDE 1000 MG: 500 TABLET ORAL at 18:04

## 2024-05-18 RX ADMIN — LATANOPROST 1 DROP: 50 SOLUTION/ DROPS OPHTHALMIC at 22:21

## 2024-05-18 RX ADMIN — SIMVASTATIN 20 MG: 10 TABLET, FILM COATED ORAL at 14:47

## 2024-05-18 RX ADMIN — VANCOMYCIN HYDROCHLORIDE 1000 MG: 1 INJECTION, SOLUTION INTRAVENOUS at 11:22

## 2024-05-18 RX ADMIN — MIRTAZAPINE 45 MG: 15 TABLET, FILM COATED ORAL at 22:19

## 2024-05-18 RX ADMIN — ONDANSETRON 4 MG: 4 TABLET, ORALLY DISINTEGRATING ORAL at 19:10

## 2024-05-18 RX ADMIN — SODIUM CHLORIDE, POTASSIUM CHLORIDE, SODIUM LACTATE AND CALCIUM CHLORIDE: 600; 310; 30; 20 INJECTION, SOLUTION INTRAVENOUS at 00:01

## 2024-05-18 RX ADMIN — LEVOTHYROXINE SODIUM 137 MCG: 0.11 TABLET ORAL at 14:47

## 2024-05-18 RX ADMIN — LATANOPROST 1 DROP: 50 SOLUTION/ DROPS OPHTHALMIC at 01:03

## 2024-05-18 RX ADMIN — FAMOTIDINE 40 MG: 20 TABLET ORAL at 14:47

## 2024-05-18 RX ADMIN — LOSARTAN POTASSIUM 100 MG: 100 TABLET, FILM COATED ORAL at 14:47

## 2024-05-18 RX ADMIN — HYDROMORPHONE HYDROCHLORIDE 4 MG: 2 TABLET ORAL at 03:14

## 2024-05-18 RX ADMIN — ACETAMINOPHEN 650 MG: 325 TABLET, FILM COATED ORAL at 08:48

## 2024-05-18 RX ADMIN — MIRTAZAPINE 15 MG: 15 TABLET, FILM COATED ORAL at 22:17

## 2024-05-18 ASSESSMENT — ACTIVITIES OF DAILY LIVING (ADL)
HEARING_DIFFICULTY_OR_DEAF: NO
WALKING_OR_CLIMBING_STAIRS_DIFFICULTY: YES
ADLS_ACUITY_SCORE: 34
NUMBER_OF_TIMES_PATIENT_HAS_FALLEN_WITHIN_LAST_SIX_MONTHS: 1
ADLS_ACUITY_SCORE: 32
VISION_MANAGEMENT: GLASSES
ADLS_ACUITY_SCORE: 33
FALL_HISTORY_WITHIN_LAST_SIX_MONTHS: YES
WEAR_GLASSES_OR_BLIND: YES
ADLS_ACUITY_SCORE: 32
TOILETING_ISSUES: NO
DIFFICULTY_EATING/SWALLOWING: NO
ADLS_ACUITY_SCORE: 35
CONCENTRATING,_REMEMBERING_OR_MAKING_DECISIONS_DIFFICULTY: NO
DRESSING/BATHING: DRESSING DIFFICULTY, ASSISTANCE 1 PERSON
ADLS_ACUITY_SCORE: 32
ADLS_ACUITY_SCORE: 35
EQUIPMENT_CURRENTLY_USED_AT_HOME: CANE, STRAIGHT;WALKER, ROLLING
ADLS_ACUITY_SCORE: 32
ADLS_ACUITY_SCORE: 34
ADLS_ACUITY_SCORE: 35
ADLS_ACUITY_SCORE: 33
ADLS_ACUITY_SCORE: 35
ADLS_ACUITY_SCORE: 35
DRESSING/BATHING_DIFFICULTY: YES
ADLS_ACUITY_SCORE: 32
WALKING_OR_CLIMBING_STAIRS: AMBULATION DIFFICULTY, DEPENDENT
ADLS_ACUITY_SCORE: 35
ADLS_ACUITY_SCORE: 32
ADLS_ACUITY_SCORE: 35
ADLS_ACUITY_SCORE: 34
ADLS_ACUITY_SCORE: 32
ADLS_ACUITY_SCORE: 35
ADLS_ACUITY_SCORE: 32
ADLS_ACUITY_SCORE: 35
DIFFICULTY_COMMUNICATING: NO
DOING_ERRANDS_INDEPENDENTLY_DIFFICULTY: NO
CHANGE_IN_FUNCTIONAL_STATUS_SINCE_ONSET_OF_CURRENT_ILLNESS/INJURY: YES
ADLS_ACUITY_SCORE: 35

## 2024-05-18 NOTE — PROGRESS NOTES
[facility]    Medicine Progress Note - Hospitalist Service    Date of Admission:  5/17/2024    Assessment & Plan   64M with hx of IDDM Type II, CKD Stage III, morbid obesity BMI 41, and hypertension presents with right knee pain and swelling. On adm, leukocytosis but no other signs of sepsis. XR of right knee with evidence of prepatellar swelling but no joint effusion.  Patellar aspiration performed with 5 mL cloudy fluid removed and sent for analysis   Aspirate is not demonstrating gram-positive cocci and patient has a history of MRSA.  1/.  Prepatellar bursitis with concerns for MRSA infection.  Discontinue ceftriaxone  Continue with IV vancomycin, discussed with orthopedic surgery may require a trip to the operating room tomorrow.  Formal consult pending at the time of this note.  2/.  Type 2 diabetes mellitus probably ongoing for 15 to 20 years with multiple endorgan issues including peripheral neuropathy and CKD.  Resume several of the home medications continue with Accu-Cheks and sliding scale insulin.  3/.  Peripheral neuropathy significant in nature, falls precautions to be instituted discussed with patient and bedside nursing stating that he does need to use his walker and ask for help when he is trying to move around.  4/.  Other medical issues including dyslipidemia hypothyroid state: Resume prior to admission medicines  Patient is being admitted under inpatient status          Diet: Moderate Consistent Carb (60 g CHO per Meal) Diet    DVT Prophylaxis: Anti-embolisim stockings (TEDs)  [place] Catheter: Not present  Lines: None     Cardiac Monitoring: None  Code Status: Full Code      Clinically Significant Risk Factors Present on Admission                # Drug Induced Platelet Defect: home medication list includes an antiplatelet medication   # Hypertension: Noted on problem list     # DMII: A1C = 8.0 % (Ref range: 0.0 - 5.6 %) within past 6 months    # Severe Obesity: Estimated body  "mass index is 41.6 kg/m  as calculated from the following:    Height as of this encounter: 1.651 m (5' 5\").    Weight as of this encounter: 113.4 kg (250 lb).              Disposition Plan     Medically Ready for Discharge: Anticipated in 2-4 Days             Mirtha Vargas MD  Hospitalist Service  [facility]  Securely message with [employer] (more info)  Text page via [employer] Paging/Directory   ______________________________________________________________________    Interval History   No acute issues overnight but continues to have knee pain with ambulation.  He does acknowledge that he has no sensation below his knees and both his legs.  Additionally synovial fluid cultures demonstrating gram-positive cocci final identification is pending.      Physical Exam   Vital Signs: Temp: 97.6  F (36.4  C) Temp src: Temporal BP: 125/57 Pulse: 82   Resp: 24 SpO2: 96 % O2 Device: None (Room air)    Weight: 250 lbs 0 oz    General Appearance: Alert awake oriented x 3 appears much older than stated age  Respiratory: Clear to auscultation  Cardiovascular: S1-S2  GI: Obese nontender bowel sounds are present  Skin: Right knee shows a scab in the anterior aspect but below the patella  Other: No lower extremity sensation noted.  Trace lower extremity edema    Medical Decision Making       50 MINUTES SPENT BY ME on the date of service doing chart review, history, exam, documentation & further activities per the note.      Data   Current Facility-Administered Medications   Medication Dose Route Frequency Provider Last Rate Last Admin    atenolol (TENORMIN) tablet 25 mg  25 mg Oral Daily John Coleman MD        buPROPion (WELLBUTRIN XL) 24 hr tablet 150 mg  150 mg Oral QAM John Coleman MD        famotidine (PEPCID) tablet 40 mg  40 mg Oral Daily Mirtha Vargas MD        [START ON 5/19/2024] ferrous sulfate (FEROSUL) tablet 325 mg  325 mg Oral Daily with breakfast Mirtha Vargas MD     "    finasteride (PROSCAR) tablet 5 mg  5 mg Oral Daily John Coleman MD        gabapentin (NEURONTIN) capsule 300 mg  300 mg Oral TID Mirtha Vargas MD        insulin aspart (NovoLOG) injection (RAPID ACTING)  1-7 Units Subcutaneous TID AC Mirtha Vragas MD   4 Units at 05/18/24 1232    insulin aspart (NovoLOG) injection (RAPID ACTING)  1-5 Units Subcutaneous At Bedtime Mirtha Vargas MD   1 Units at 05/17/24 2357    insulin glargine (BASAGLAR PEN) injection 40 Units  40 Units Subcutaneous At Bedtime Mirtha Vargas MD        insulin glargine (BASAGLAR PEN) injection 40 Units  40 Units Subcutaneous Daily John Coleman MD        latanoprost (XALATAN) 0.005 % ophthalmic solution 1 drop  1 drop Both Eyes At Bedtime John Coleman MD   1 drop at 05/18/24 0103    levothyroxine (SYNTHROID/LEVOTHROID) tablet 137 mcg  137 mcg Oral Daily Mirtha Vargas MD        losartan (COZAAR) tablet 100 mg  100 mg Oral Daily Mirtha Vargas MD        metFORMIN (GLUCOPHAGE) tablet 1,000 mg  1,000 mg Oral BID w/meals Mirtha Vargas MD        mirtazapine (REMERON) tablet 15 mg  15 mg Oral At Bedtime Mirtha Vargas MD        mirtazapine (REMERON) tablet 45 mg  45 mg Oral At Bedtime Mirtha Vargas MD        simvastatin (ZOCOR) tablet 20 mg  20 mg Oral Daily Mirtha Varags MD        vancomycin (VANCOCIN) 1,000 mg in 200 mL dextrose intermittent infusion  1,000 mg Intravenous Q24H John Coleman  mL/hr at 05/18/24 1122 1,000 mg at 05/18/24 1122       ------------------------- PAST 24 HR DATA REVIEWED -----------------------------------------------    I have personally reviewed the following data over the past 24 hrs:    13.4 (H)  \   9.0 (L)   / 241     130 (L) 98 23.6 (H) /  303 (H)   4.3 20 (L) 1.80 (H) \     Procal: N/A CRP: 254.74 (H) Lactic Acid: 0.9         Imaging results reviewed over the past 24 hrs:   Recent Results (from the past 24 hour(s))   XR Knee Right 3  Views    Narrative    EXAM: XR KNEE RIGHT 3 VIEWS  LOCATION: [facility]  DATE: 5/17/2024    INDICATION: pain, swelling  COMPARISON: 2/1/2024      Impression    IMPRESSION: Normal joint spaces and alignment. No fracture or joint effusion. Prepatellar soft tissue edema or fluid collection which may reflect bursitis.

## 2024-05-18 NOTE — PLAN OF CARE
"Goal Outcome Evaluation:      Plan of Care Reviewed With: patient    Overall Patient Progress: improvingOverall Patient Progress: improving         Care Continued from 00:30-7:30    Events this shift    Orientation   BP (!) 158/67   Pulse 95   Temp 98.2  F (36.8  C)   Resp 16   Ht 1.651 m (5' 5\")   Wt 113.4 kg (250 lb)   SpO2 95%   BMI 41.60 kg/m    O2: RA  B   Pain: Pt states pain when moving or walking. No pain when resting. Wide ranges of pain overnight  Ambulation: Assist 2 walker gb  Voiding: Bedside commode   Cms: Baseline Neuropathy All extremities   Gtts:        Plan: ED admit 0030. R leg cellulitis over patellar region. Contact for MRSA. Histoy of uncontrolled diabetes Pt stated \" I dont check my blood sugar at home\" HTN, CKD, Obese. Oral dilaudid 4mg given at 0314. Possible discharge back home today or tomorrow depending on pain control.    Pt has tremors that are baseline. Pt states that Baseline balance is better in the AM and slowly gets more unstable during the day.       Problem: Adult Inpatient Plan of Care  Goal: Plan of Care Review  Description: The Plan of Care Review/Shift note should be completed every shift.  The Outcome Evaluation is a brief statement about your assessment that the patient is improving, declining, or no change.  This information will be displayed automatically on your shift  note.  Outcome: Progressing  Flowsheets (Taken 2024)  Plan of Care Reviewed With: patient  Overall Patient Progress: improving  Goal: Patient-Specific Goal (Individualized)  Description: You can add care plan individualizations to a care plan. Examples of Individualization might be:  \"Parent requests to be called daily at 9am for status\", \"I have a hard time hearing out of my right ear\", or \"Do not touch me to wake me up as it startles  me\".  Outcome: Progressing  Goal: Absence of Hospital-Acquired Illness or Injury  Outcome: Progressing  Intervention: Identify and Manage " Fall Risk  Recent Flowsheet Documentation  Taken 5/18/2024 0036 by Lorenzo Pedraza RN  Safety Promotion/Fall Prevention:   activity supervised   lighting adjusted   mobility aid in reach   nonskid shoes/slippers when out of bed   room near nurse's station   safety round/check completed  Intervention: Prevent Skin Injury  Recent Flowsheet Documentation  Taken 5/18/2024 0036 by Lorenzo Pedraza RN  Body Position: position changed independently  Skin Protection: adhesive use limited  Device Skin Pressure Protection:   absorbent pad utilized/changed   adhesive use limited  Intervention: Prevent Infection  Recent Flowsheet Documentation  Taken 5/18/2024 0036 by Lorenzo Pedraza RN  Infection Prevention:   hand hygiene promoted   equipment surfaces disinfected   personal protective equipment utilized  Goal: Optimal Comfort and Wellbeing  Outcome: Progressing  Goal: Readiness for Transition of Care  Outcome: Progressing

## 2024-05-18 NOTE — ED NOTES
[facility]  ED Nurse Handoff Report    ED Chief complaint: Knee Pain  . ED Diagnosis:   Final diagnoses:   Septic prepatellar bursitis, right   Cellulitis of right leg       Allergies: No Known Allergies    Code Status: Full Code    Activity level - Baseline/Home:  independent.  Activity Level - Current:   assist of 2.   Lift room needed: No.   Bariatric: No   Needed: No   Isolation: No.   Infection: Not Applicable.     Respiratory status: Room air    Vital Signs (within 30 minutes):   Vitals:    05/17/24 2212 05/17/24 2230 05/17/24 2245 05/17/24 2300   BP: (!) 155/82 (!) 147/84 (!) 150/88 (!) 141/72   Pulse:  87 90 85   Resp:       Temp:       TempSrc:       SpO2:       Weight:       Height:         Cardiac Rhythm:  ,      Pain level:  KNEE - MORPHINE  Patient confused: No.   Patient Falls Risk: patient and family education.   Elimination Status:  NOT YET       Focused Assessment:      SkinSkin Comments: REDNESS, SWELLING AND PAIN TO RIGHT KNEE. SLIGHTLY WARM TO TOUCH.     Abnormal Results:   Labs Ordered and Resulted from Time of ED Arrival to Time of ED Departure   BASIC METABOLIC PANEL - Abnormal       Result Value    Sodium 131 (*)     Potassium 4.5      Chloride 95 (*)     Carbon Dioxide (CO2) 21 (*)     Anion Gap 15      Urea Nitrogen 24.3 (*)     Creatinine 1.74 (*)     GFR Estimate 43 (*)     Calcium 9.7      Glucose 234 (*)    CBC WITH PLATELETS AND DIFFERENTIAL - Abnormal    WBC Count 15.7 (*)     RBC Count 3.47 (*)     Hemoglobin 10.5 (*)     Hematocrit 30.5 (*)     MCV 88      MCH 30.3      MCHC 34.4      RDW 13.4      Platelet Count 291      % Neutrophils 86      % Lymphocytes 5      % Monocytes 8      % Eosinophils 0      % Basophils 0      % Immature Granulocytes 1      NRBCs per 100 WBC 0      Absolute Neutrophils 13.5 (*)     Absolute Lymphocytes 0.8      Absolute Monocytes 1.3      Absolute Eosinophils 0.0      Absolute Basophils 0.0      Absolute Immature  Granulocytes 0.1      Absolute NRBCs 0.0     CRP INFLAMMATION - Abnormal    CRP Inflammation 254.74 (*)    ERYTHROCYTE SEDIMENTATION RATE AUTO - Abnormal    Erythrocyte Sedimentation Rate 62 (*)    GLUCOSE BY METER - Abnormal    GLUCOSE BY METER POCT 189 (*)    LACTIC ACID WHOLE BLOOD WITH 1X REPEAT IN 2 HR WHEN >2 - Normal    Lactic Acid, Initial 0.9     BLOOD CULTURE   BLOOD CULTURE        XR Knee Right 3 Views   Final Result   IMPRESSION: Normal joint spaces and alignment. No fracture or joint effusion. Prepatellar soft tissue edema or fluid collection which may reflect bursitis.        Treatments provided: PIV, LABS, BLOOD CULTURESX2, ABX, IVFLUIDS, XRAY, ASPIRATION OF KNEE BY ERMD, VITALS,  Family Comments: NONE  OBS brochure/video discussed/provided to patient:  No  ED Medications:   Medications   vancomycin (VANCOCIN) 1,750 mg in 0.9% NaCl 500 mL intermittent infusion (has no administration in time range)   sodium chloride 0.9% BOLUS 1,000 mL (0 mLs Intravenous Stopped 5/17/24 2223)   morphine (PF) injection 4 mg (4 mg Intravenous $Given 5/17/24 2122)   cefTRIAXone (ROCEPHIN) 1 g vial to attach to  mL bag for ADULTS or NS 50 mL bag for PEDS (0 g Intravenous Stopped 5/17/24 2223)     Drips infusing:  Yes, VANCO  For the majority of the shift this patient was Green.   Interventions performed were .    Sepsis treatment initiated: No    Cares/treatment/interventions/medications to be completed following ED care: PER FLOOR PROTOCOL, VITALS, PAIN MEDS, BLOOD SUGAR,     ED Nurse Name: Mary Beth Lr RN  10:23 PM    RECEIVING UNIT ED HANDOFF REVIEW    Above ED Nurse Handoff Report was reviewed: Yes  Reviewed by: Lorenzo Pedraza RN on May 17, 2024 at 11:43 PM   HARRY Juárez called the ED to inform them the note was read: Yes

## 2024-05-18 NOTE — PLAN OF CARE
"Goal Outcome Evaluation:      Plan of Care Reviewed With: patient    Overall Patient Progress: improvingOverall Patient Progress: improving         Care Continued from 00:30-7:30    Events this shift    Orientation   BP (!) 158/67   Pulse 95   Temp 98.2  F (36.8  C)   Resp 16   Ht 1.651 m (5' 5\")   Wt 113.4 kg (250 lb)   SpO2 95%   BMI 41.60 kg/m    O2: RA  B   Pain: Pt states pain when moving or walking. No pain when resting. Wide ranges of pain overnight  Ambulation: Assist 2 walker gb  Voiding: Bedside commode   Cms: Baseline Neuropathy All extremities   Gtts:        Plan: ED admit 0030. R leg cellulitis over patellar region. Contact for MRSA. Histoy of uncontrolled diabetes Pt stated \" I dont check my blood sugar at home\" HTN, CKD, Obese. Oral dilaudid 4mg given at 0314. Possible discharge back home today or tomorrow depending on pain control.    Pt has tremors that are baseline. Pt states that Baseline balance is better in the AM and slowly gets more unstable during the day.  This AM pt tremors appear very minimal, pt denies pain if not moving.       Problem: Adult Inpatient Plan of Care  Goal: Plan of Care Review  Description: The Plan of Care Review/Shift note should be completed every shift.  The Outcome Evaluation is a brief statement about your assessment that the patient is improving, declining, or no change.  This information will be displayed automatically on your shift  note.  Outcome: Progressing  Flowsheets (Taken 2024 313)  Plan of Care Reviewed With: patient  Overall Patient Progress: improving  Goal: Patient-Specific Goal (Individualized)  Description: You can add care plan individualizations to a care plan. Examples of Individualization might be:  \"Parent requests to be called daily at 9am for status\", \"I have a hard time hearing out of my right ear\", or \"Do not touch me to wake me up as it startles  me\".  Outcome: Progressing  Goal: Absence of Hospital-Acquired " Illness or Injury  Outcome: Progressing  Intervention: Identify and Manage Fall Risk  Recent Flowsheet Documentation  Taken 5/18/2024 0036 by Lorenzo Pedraza RN  Safety Promotion/Fall Prevention:   activity supervised   lighting adjusted   mobility aid in reach   nonskid shoes/slippers when out of bed   room near nurse's station   safety round/check completed  Intervention: Prevent Skin Injury  Recent Flowsheet Documentation  Taken 5/18/2024 0036 by Lorenzo Pedraza RN  Body Position: position changed independently  Skin Protection: adhesive use limited  Device Skin Pressure Protection:   absorbent pad utilized/changed   adhesive use limited  Intervention: Prevent Infection  Recent Flowsheet Documentation  Taken 5/18/2024 0036 by Lorenzo Pedraza RN  Infection Prevention:   hand hygiene promoted   equipment surfaces disinfected   personal protective equipment utilized  Goal: Optimal Comfort and Wellbeing  Outcome: Progressing  Goal: Readiness for Transition of Care  Outcome: Progressing

## 2024-05-18 NOTE — PHARMACY-VANCOMYCIN DOSING SERVICE
Pharmacy Vancomycin Initial Note  Date of Service May 18, 2024  Patient's  1960  64 year old, male    Indication: Bone and Joint Infection    Current estimated CrCl = Estimated Creatinine Clearance: 48.6 mL/min (A) (based on SCr of 1.74 mg/dL (H)).    Creatinine for last 3 days  2024:  6:14 PM Creatinine 1.74 mg/dL    Recent Vancomycin Level(s) for last 3 days  No results found for requested labs within last 3 days.      Vancomycin IV Administrations (past 72 hours)                     vancomycin (VANCOCIN) 1,750 mg in 0.9% NaCl 500 mL intermittent infusion (mg) 1,750 mg Given 24 2254                    Nephrotoxins and other renal medications (From now, onward)      Start     Dose/Rate Route Frequency Ordered Stop    24 1100  vancomycin (VANCOCIN) 1,000 mg in 200 mL dextrose intermittent infusion         1,000 mg  200 mL/hr over 1 Hours Intravenous EVERY 24 HOURS 24 0248              Contrast Orders - past 72 hours (72h ago, onward)      None            [employer] Prediction of Planned Initial Vancomycin Regimen  Loading dose: 1750 mg  Regimen: 1000 mg IV every 24 hours.  Regimen Start time: 10:54 on 2024  Exposure target: AUC24 (range)400-600 mg/L.hr   AUC24,ss: 528 mg/L.hr  Probability of AUC24 > 400: 72 %  Ctrough,ss: 15.4 mg/L  Probability of Ctrough,ss > 20: 34 %  Probability of nephrotoxicity (Lodise CLAUDIO ): 11 %        Plan:  Start vancomycin 1000 mg IV q24h.   Vancomycin monitoring method: AUC  Vancomycin therapeutic monitoring goal: 400-600 mg*h/L  Pharmacy will check vancomycin levels as appropriate in 1-3 Days.    Serum creatinine levels will be ordered daily for the first week of therapy and at least twice weekly for subsequent weeks.      Laureano Jin, [facility]

## 2024-05-18 NOTE — PROGRESS NOTES
05/18/24 8255   Appointment Info   Signing Clinician's Name / Credentials (PT) Lori Aguilar DPT   Living Environment   People in Home significant other;child(roverto), adult   Current Living Arrangements house   Living Environment Comments Patient reports that he lives in 4 level modified split home.   Self-Care   Usual Activity Tolerance moderate   Current Activity Tolerance poor   Equipment Currently Used at Home cane, straight;walker, rolling   Fall history within last six months yes   Number of times patient has fallen within last six months 1   Activity/Exercise/Self-Care Comment Patient reports that he has both a cane and walker for use at home (switches between the two as needed).  Reports that with increased fatigue (later in the day), he tends to have decreased balance and requires use of assistive device with all mobility.   General Information   Onset of Illness/Injury or Date of Surgery 05/17/24   Referring Physician Mirtha Vargas MD   Patient/Family Therapy Goals Statement (PT) decrease pain   Pertinent History of Current Problem (include personal factors and/or comorbidities that impact the POC) Per medical chart: 64M with hx of IDDM Type II with peripheral neuropathy (hands/LEs), CKD Stage III, morbid obesity BMI 41, and hypertension presents with right knee pain and swelling.   Existing Precautions/Restrictions fall   General Observations Patient with previous right middle finger partial amputation   Cognition   Orientation Status (Cognition) oriented x 4   Pain Assessment   Patient Currently in Pain Yes, see Vital Sign flowsheet  (Reports pain at right knee with any knee flexion or right LE weight bearing)   Integumentary/Edema   Integumentary/Edema Comments Patient with edema apparent at anterior right knee   Posture    Posture Forward head position;Protracted shoulders   Range of Motion (ROM)   Range of Motion ROM deficits secondary to pain;ROM deficits secondary to swelling   ROM Comment  Limited right knee ROM   Strength (Manual Muscle Testing)   Strength (Manual Muscle Testing) Deficits observed during functional mobility   Bed Mobility   Comment, (Bed Mobility) Independent   Transfers   Transfers sit-stand transfer   Sit-Stand Transfer   Sit-Stand [place] (Transfers) contact guard   Assistive Device (Sit-Stand Transfers) walker, front-wheeled   Gait/Stairs (Locomotion)   [place] Level (Gait) contact guard   Assistive Device (Gait) walker, front-wheeled   Balance   Balance Comments Patient at high risk of falling secondary to peripheral neuropathy   Sensory Examination   Sensory Perception Comments Patient reports no sensation bilaterally below knees, limited sensation in bilateral hands.   Coordination   Coordination Comments decreased coordination secondary to neuropathy   Clinical Impression   Criteria for Skilled Therapeutic Intervention Yes, treatment indicated   PT Diagnosis (PT) Impaired functional mobility   Influenced by the following impairments pain, neuropathy, decreased strength, decreased ROM, decreased activity tolerance   Functional limitations due to impairments difficulties with transfers, gait   Clinical Presentation (PT Evaluation Complexity) evolving   Clinical Presentation Rationale Clinical judgement   Clinical Decision Making (Complexity) moderate complexity   Planned Therapy Interventions (PT) balance training;bed mobility training;gait training;patient/family education;stair training;strengthening;transfer training;progressive activity/exercise   Risk & Benefits of therapy have been explained evaluation/treatment results reviewed;care plan/treatment goals reviewed;risks/benefits reviewed;current/potential barriers reviewed;participants voiced agreement with care plan;participants included;patient   PT Total Evaluation Time   PT Eval, Moderate Complexity Minutes (34380) 10   Physical Therapy Goals   PT Frequency Daily   PT Predicted Duration/Target Date for Goal  Attainment 05/22/24   PT Goals Transfers;Gait;Stairs   PT: Transfers Sit to/from stand;Bed to/from chair;Assistive device;Supervision/stand-by assist   PT: Gait Supervision/stand-by assist;Assistive device;150 feet   PT: Stairs Supervision/stand-by assist;8 stairs;Rail on right;Assistive device   Interventions   Interventions Quick Adds Therapeutic Activity   Therapeutic Activity   Therapeutic Activities: dynamic activities to improve functional performance Minutes (55172) 24   Symptoms Noted During/After Treatment Increased pain;Fatigue   Treatment Detail/Skilled Intervention Patient supine upon arrival.  Patient independent with bed mobility with environmental set up (raised HOB, use of bed rails).  Facilitated transfer between sitting and standing with 2WW, CGA and raised bed height.  Facilitated amb 20+20 feet with 2WW and CGA.  Patient with slow gait speed, decreased weight shift over right LE, fair foot clearance.  Reports increased pain with weight bearing at right LE, however improved since yesterday per patient.  Facilitated toilet transfer with verbal cueing, walker stabilization.  Increased pain with right knee flexion.  Required mod A with brief management.  Facilitated return to supine with environmental set up, verbal cueing.  In bed with alarm on at end of session.   PT Discharge Planning   PT Plan progress amb distance   PT Discharge Recommendation (DC Rec) home with outpatient physical therapy   PT Rationale for DC Rec Patient presents below baseline for functional mobility.  While patient has demonstrated improvement since yesterday, continues to have difficulty with household distance ambulation and currently requires Ax1.  Will update recommendations accordingly; anticipated that with ongoing medical management patient will be able to return to home at discharge.   Total Session Time   Timed Code Treatment Minutes 24   Total Session Time (sum of timed and untimed services) 34

## 2024-05-18 NOTE — ED PROVIDER NOTES
"  Emergency Department Note      History of Present Illness     Chief Complaint  Knee Pain    HPI  Steve Morgan is a 64 year old male with history of type 2 diabetes mellitus, hypertension, chronic kidney disease, and obesity who presents for evaluation of right knee pain. The patient states that when he went to bed two days ago his right leg felt fine. Then yesterday morning when he woke up he felt pain in his right inner leg which then radiated and became localized around the right knee. He endorses tenderness and erythema. He notes having chills a couple of days ago. The patient denies recent infection, fever, and body aches.      Review of External Notes  Office visit reviewed from February 2024 when the patient was seen for an injury of the right leg.  Past Medical History   Medical History and Problem List  Obesity  Chronic kidney disease  Type 2 diabetes mellitus  Hypertension  Anemia  Neuropathy  Sleep apnea  Anxiety    Medications  Metformin  Glipizide    Surgical History   Eye surgery  Colonoscopy  Biopsy  Abdomen surgery    Physical Exam   Patient Vitals for the past 24 hrs:   BP Temp Temp src Pulse Resp SpO2 Height Weight   05/17/24 2300 (!) 141/72 -- -- 85 -- -- -- --   05/17/24 2245 (!) 150/88 -- -- 90 -- -- -- --   05/17/24 2230 (!) 147/84 -- -- 87 -- -- -- --   05/17/24 2212 (!) 155/82 -- -- -- -- -- -- --   05/17/24 2200 (!) 157/76 -- -- 90 -- -- -- --   05/17/24 2142 (!) 154/76 -- -- -- -- 92 % -- --   05/17/24 2137 -- -- -- -- -- 96 % -- --   05/17/24 2132 (!) 152/80 -- -- 85 -- 94 % -- --   05/17/24 2122 (!) 174/82 -- -- -- -- 94 % -- --   05/17/24 2107 -- -- -- -- -- (!) 84 % -- --   05/17/24 2052 (!) 163/81 -- -- (!) 45 -- 96 % -- --   05/17/24 1740 (!) 152/96 99.9  F (37.7  C) Oral 89 18 96 % 1.651 m (5' 5\") 113.4 kg (250 lb)     Physical Exam  Constitutional:       General: He is not in acute distress.     Appearance: Normal appearance. He is not toxic-appearing.   HENT:      Head: " Atraumatic.   Eyes:      General: No scleral icterus.     Conjunctiva/sclera: Conjunctivae normal.   Cardiovascular:      Rate and Rhythm: Normal rate and regular rhythm.      Heart sounds: Normal heart sounds.   Pulmonary:      Effort: Pulmonary effort is normal. No respiratory distress.      Breath sounds: Normal breath sounds.   Abdominal:      Palpations: Abdomen is soft.      Tenderness: There is no abdominal tenderness.   Musculoskeletal:         General: No deformity.      Cervical back: Neck supple.      Right lower leg: No edema.      Left lower leg: No edema.   Skin:     General: Skin is warm.      Comments: There is an abrasion inferior to the left knee.  There is swelling with tenderness of the left prepatellar bursa.  There is surrounding erythema.  No joint effusion of the knee.  There is full range of motion of the knee that is preserved.   Neurological:      Mental Status: He is alert.         Diagnostics   Lab Results   Labs Ordered and Resulted from Time of ED Arrival to Time of ED Departure   BASIC METABOLIC PANEL - Abnormal       Result Value    Sodium 131 (*)     Potassium 4.5      Chloride 95 (*)     Carbon Dioxide (CO2) 21 (*)     Anion Gap 15      Urea Nitrogen 24.3 (*)     Creatinine 1.74 (*)     GFR Estimate 43 (*)     Calcium 9.7      Glucose 234 (*)    CBC WITH PLATELETS AND DIFFERENTIAL - Abnormal    WBC Count 15.7 (*)     RBC Count 3.47 (*)     Hemoglobin 10.5 (*)     Hematocrit 30.5 (*)     MCV 88      MCH 30.3      MCHC 34.4      RDW 13.4      Platelet Count 291      % Neutrophils 86      % Lymphocytes 5      % Monocytes 8      % Eosinophils 0      % Basophils 0      % Immature Granulocytes 1      NRBCs per 100 WBC 0      Absolute Neutrophils 13.5 (*)     Absolute Lymphocytes 0.8      Absolute Monocytes 1.3      Absolute Eosinophils 0.0      Absolute Basophils 0.0      Absolute Immature Granulocytes 0.1      Absolute NRBCs 0.0     CRP INFLAMMATION - Abnormal    CRP Inflammation 254.74  (*)    ERYTHROCYTE SEDIMENTATION RATE AUTO - Abnormal    Erythrocyte Sedimentation Rate 62 (*)    GLUCOSE BY METER - Abnormal    GLUCOSE BY METER POCT 189 (*)    LACTIC ACID WHOLE BLOOD WITH 1X REPEAT IN 2 HR WHEN >2 - Normal    Lactic Acid, Initial 0.9     CELL COUNT BODY FLUID   DIFERENTIAL BODY FLUID   BLOOD CULTURE   BLOOD CULTURE   AEROBIC BACTERIAL CULTURE ROUTINE   CELL COUNT WITH DIFFERENTIAL FLUID       Imaging  XR Knee Right 3 Views   Final Result   IMPRESSION: Normal joint spaces and alignment. No fracture or joint effusion. Prepatellar soft tissue edema or fluid collection which may reflect bursitis.        Independent Interpretation  X-ray of the left knee independently interpreted.  No fracture.  There is swelling over the prepatellar bursa.  ED Course    Medications Administered  Medications   vancomycin (VANCOCIN) 1,750 mg in 0.9% NaCl 500 mL intermittent infusion (1,750 mg Intravenous $Given 5/17/24 2254)   sodium chloride 0.9% BOLUS 1,000 mL (0 mLs Intravenous Stopped 5/17/24 2223)   morphine (PF) injection 4 mg (4 mg Intravenous $Given 5/17/24 2122)   cefTRIAXone (ROCEPHIN) 1 g vial to attach to  mL bag for ADULTS or NS 50 mL bag for PEDS (0 g Intravenous Stopped 5/17/24 2223)       Procedures  Procedures   Right prepatellar bursa aspiration  Indication: Septic bursitis right prepatellar bursa  The right anterior knee was prepped with Betadine.  A 22-gauge needle was inserted by the lateral approach into the prepatellar bursa.  5 mL of cloudy yellow fluid was removed.  A bandage was applied.  No complications.  The patient tolerated this well.    Social Determinants of Health adding to complexity of care  Poorly controlled diabetes.    ED Course  ED Course as of 05/17/24 2310   Fri May 17, 2024   1942 I obtained history and performed physical exam as noted above.      Medical Decision Making / Diagnosis     MURRAY  Steve Morgan is a 64 year old male who presents to the ED for evaluation of  pain and swelling of the right knee.  He appears to have septic bursitis.  This is concerning in the setting of surrounding erythema with a leukocytosis and poorly controlled diabetes.  He was hydrated with IV fluids.  Lactate is normal and he does not have endorgan dysfunction or hypotension.  He does not meet criteria for severe sepsis or septic shock.  He was given broad-spectrum antibiotics.  We will follow cell counts and cultures from the bursa fluid    Disposition  Admit to the hospital service    ICD-10 Codes:    ICD-10-CM    1. Septic prepatellar bursitis, right  M71.161       2. Cellulitis of right leg  L03.115              Scribe Disclosure:  Connor MCDONALD, am serving as a scribe at 7:48 PM on 5/17/2024 to document services personally performed by Joshua Johnson MD based on my observations and the provider's statements to me.        Joshua Johnson MD  05/17/24 5066

## 2024-05-18 NOTE — H&P
[facility]       Hospitalist History & Physical     Assessment & Plan     ASSESSMENT    64M with hx of IDDM Type II, CKD Stage III, morbid obesity BMI 41, and hypertension presents with right knee pain and swelling. On adm, leukocytosis but no other signs of sepsis. XR of right knee with evidence of prepatellar swelling but no joint effusion.  Patellar aspiration performed with 5 mL cloudy fluid removed and sent for analysis. Will admit for IV antibiotics given severity of infection, should be able to transition to oral tomor for 10d course if improved.    PLAN    Septic prepatellar bursitis of right knee  -Presents with pain and swelling of right lateral knee  -Evidence of inflammation of R pre-patellar bursa on exam  -S/p bursa aspiration, results currently pending  -No evidence of knee joint infection currently  -Will admit for IV antibiotics given severity of infection, should be able to transition to oral tomor for 10d course if improved  PLAN  -Ceftriaxone 2 g daily  -Vancomycin per pharmacy  -Follow-up bursa testing and blood cultures  -Physical therapy consultation for mobility needs for right knee    Other issues  -CKD stage III: Creatinine at baseline  -Essential hypertension: Home medications  -IDDM Type II: MDSS + home Lantus 40U nightly  -Hypovolemic hyponatremia: LR@100ml/hr and trend  -Morbid obesity BMI 41: Complicates all aspects of care    DVT Prophy  -SCDs    Disposition  -Observation unit      Bolivar Shaffer MD    History of Present Illness     Steve Morgan is a 64 year old with hx of IDDM Type II, CKD Stage III, morbid obesity BMI 41, and hypertension presents with right knee pain and swelling.  Symptoms began when he woke up in the morning 1 day prior.  Denies any injury to the knee.  Has been noticing pain mostly in the lateral aspect of right knee as well as redness and mild swelling.  Still able to bend the knee and walk on the knee although difficult due to pain.  Chills  but no actual fevers.  Denies nausea or vomiting.  No hardware in his knee.  In the ED, VSS.  WBC 15.7.  XR of right knee with evidence of prepatellar swelling but no joint effusion.  Patellar aspiration performed with 5 mL cloudy fluid removed and sent for analysis.  Patient started on antibiotics admitted to medicine.    Review of Systems     A Comprehensive greater than 10 system review of systems was carried out.  Pertinent positives and negatives are noted above.  Otherwise negative for contributory information.     Past Medical History     Past Medical History:   Diagnosis Date    Cellulitis and abscess of trunk 6/27/2017    Depression     Hyperlipidemia LDL goal <100 10/31/2010    Hypertension goal BP (blood pressure) < 140/90 3/17/2011    Hypothyroidism 1/12/2010    Morbid obesity due to excess calories (H) 1/12/2010    Neuropathy in diabetes (H) 1/12/2010    Obesity 1/12/2010    Sleep apnea 1/12/2010    CPAP    Type 2 diabetes, HbA1C goal < 8% (H) 3/8/2011     Medications     Current Outpatient Medications   Medication Sig Dispense Refill    acetaminophen (TYLENOL) 325 MG tablet Take 2 tablets (650 mg) by mouth every 4 hours as needed for pain or fever 100 tablet 0    ASPIRIN 81 MG OR TABS Take 81 mg by mouth every evening 100 3    atenolol (TENORMIN) 25 MG tablet TAKE 1 TABLET BY MOUTH EVERY DAY 90 tablet 1    buPROPion (WELLBUTRIN XL) 150 MG 24 hr tablet Take 1 tablet (150 mg) by mouth every morning 90 tablet 1    Calcium Carb-Cholecalciferol (CALCIUM 600 + D PO) Take 1 tablet by mouth daily      Continuous Blood Gluc  (FREESTYLE GIULIANA 2 READER) SIENA 1 Device continuous 1 each 0    Continuous Blood Gluc Sensor (FREESTYLE GIULIANA 2 SENSOR) [facility] [place] 1 each every 14 days Use 1 sensor every 14 days. Use to read blood sugars per 's instructions. 2 each 11    Cyanocobalamin (VITAMIN B 12 PO) Take 250 mcg by mouth daily      famotidine (PEPCID) 40 MG tablet TAKE 1 TABLET BY MOUTH EVERY DAY 90  tablet 1    ferrous sulfate 325 (65 FE) MG tablet Take 1 tablet by mouth daily (with breakfast).      finasteride (PROSCAR) 5 MG tablet TAKE 1 TABLET BY MOUTH EVERY DAY 90 tablet 1    gabapentin (NEURONTIN) 300 MG capsule Take 1 capsule (300 mg) by mouth 3 times daily for 5 days 15 capsule 0    glipiZIDE (GLUCOTROL XL) 10 MG 24 hr tablet TAKE 1 TABLET (10 MG) BY MOUTH DAILY. 90 tablet 7    ibuprofen (ADVIL/MOTRIN) 200 MG tablet Take 400 mg by mouth every 6 hours as needed for pain (Patient not taking: Reported on 4/10/2024)      INSULIN GLARGINE 100 UNIT/ML pen INJECT 40 UNITS SUBCUTANEOUS DAILY 30 mL 0    insulin pen needle (B-D U/F) 31G X 5 MM miscellaneous USE 4 DAILY OR AS DIRECTED. 400 each 0    latanoprost (XALATAN) 0.005 % ophthalmic solution INSTILL 1 DROP INTO BOTH EYES IN THE EVENING      LEVOXYL 137 MCG tablet TAKE 1 TABLET BY MOUTH EVERY DAY 30 tablet 0    losartan (COZAAR) 100 MG tablet Take 1 tablet (100 mg) by mouth daily 90 tablet 1    metFORMIN (GLUCOPHAGE) 1000 MG tablet TAKE 1 TABLET BY MOUTH TWICE A DAY WITH MEALS 180 tablet 0    methocarbamol (ROBAXIN) 500 MG tablet Take 1 tablet (500 mg) by mouth 4 times daily as needed for muscle spasms (Patient not taking: Reported on 4/10/2024) 15 tablet 0    mirtazapine (REMERON) 15 MG tablet Take 1 tablet (15 mg) by mouth at bedtime 30 tablet 2    mirtazapine (REMERON) 45 MG tablet Take 1 tablet (45 mg) by mouth at bedtime Take along with a 15 mg tablet for a total of 60 mg nightly 30 tablet 2    MULTI-VITAMIN OR TABS Take 1 tablet by mouth daily 30 0    mupirocin (BACTROBAN) 2 % external ointment Apply topically 3 times daily To finger (Patient not taking: Reported on 4/10/2024) 22 g 1    pantoprazole (PROTONIX) 40 MG EC tablet Take 1 tablet (40 mg) by mouth daily as needed for heartburn 90 tablet 1    simvastatin (ZOCOR) 20 MG tablet Take 1 tablet (20 mg) by mouth At Bedtime 90 tablet 1    Urea (URE-NA) 15 g PACK packet Take 15 g by mouth daily (Patient  "not taking: Reported on 11/28/2023) 30 packet 0    VITAMIN D, CHOLECALCIFEROL, PO Take 1,000 Units by mouth daily. (Patient not taking: Reported on 4/10/2024)        Past Surgical History     Past Surgical History:   Procedure Laterality Date    BIOPSY      COLONOSCOPY      EYE SURGERY      GENITOURINARY SURGERY      surg for undescended testicle    IRRIGATION AND DEBRIDEMENT HAND, COMBINED Right 12/23/2022    Procedure: Right long finger irrigation and debridement with partial amputation of the right long finger. ;  Surgeon: Colby English MD;  Location: [facility] OR    REPAIR HAMMER TOE BILATERAL  5/16/2013    Procedure: REPAIR HAMMER TOE BILATERAL;  Flexor Tenotomy Toes 2,3,4,5 Bilateral Feet;  Surgeon: Saad Bangura DPM;  Location: [facility] OR     Family History     Family History   Problem Relation Age of Onset    Breast Cancer Mother     Hypertension Mother     Thyroid Disease Mother     Depression Mother     Alzheimer Disease Mother 82    Cancer - colorectal Father     Thyroid Disease Father     Depression Father     Other - See Comments Father         bladder polyps    Heart Disease Maternal Grandmother         CHF    Circulatory Paternal Grandmother     Cancer Paternal Grandfather     Diabetes Brother     Diabetes Brother     Asthma Brother      Allergies   No Known Allergies    Social History     Social History     Tobacco Use    Smoking status: Never    Smokeless tobacco: Never   Substance Use Topics    Alcohol use: Yes     Comment: Occassionally     Physical Exam   Blood pressure (!) 141/72, pulse 85, temperature 99.9  F (37.7  C), temperature source Oral, resp. rate 18, height 1.651 m (5' 5\"), weight 113.4 kg (250 lb), SpO2 92%.    General: Ill appearing, cooperative with exam, in NAD.  HEENT: Atraumatic. No erythema in posterior pharynx.  Lymph: No cervical or inguinal lymphadenopathy.  Cardiac: RRR. No murmurs.  Lungs: CTAB. Nl WOB.  Abd: Non-tender. No rebound or gaurding. Nl bowel sounds.  Ext: Right " knee with focal swelling and erythema over prepatellar bursa.  Range of motion intact.  Skin: No rashes, abrasions, or contusions.  Psych: A&Ox3. Nl affect.  Neuro: 5/5 strength. Sensation intact.    Labs & Imaging     Reviewed and Pertinent results discussed in assessment and plan.

## 2024-05-18 NOTE — PLAN OF CARE
"Goal Outcome Evaluation:      Plan of Care Reviewed With: patient    Overall Patient Progress: improvingOverall Patient Progress: improving    Outcome Evaluation: Assumed cares from 1530-1930. Denied pain while in bed. Possible I/D for tomorrow. Minimal swelling/redness noted on right knee. NPO at midnight.  Problem: Adult Inpatient Plan of Care  Goal: Plan of Care Review  Description: The Plan of Care Review/Shift note should be completed every shift.  The Outcome Evaluation is a brief statement about your assessment that the patient is improving, declining, or no change.  This information will be displayed automatically on your shift  note.  Outcome: Progressing  Flowsheets (Taken 5/18/2024 2297)  Outcome Evaluation: Assumed cares from 1530-1930. Denied pain while in bed. Possible I/D for tomorrow. Minimal swelling/redness noted on right knee. NPO at midnight.  Plan of Care Reviewed With: patient  Overall Patient Progress: improving  Goal: Patient-Specific Goal (Individualized)  Description: You can add care plan individualizations to a care plan. Examples of Individualization might be:  \"Parent requests to be called daily at 9am for status\", \"I have a hard time hearing out of my right ear\", or \"Do not touch me to wake me up as it startles  me\".  Outcome: Progressing  Goal: Absence of Hospital-Acquired Illness or Injury  Outcome: Progressing  Goal: Optimal Comfort and Wellbeing  Outcome: Progressing  Goal: Readiness for Transition of Care  Outcome: Progressing     Problem: Skin Injury Risk Increased  Goal: Skin Health and Integrity  Outcome: Progressing     Problem: Skin or Soft Tissue Infection  Goal: Absence of Infection Signs and Symptoms  Outcome: Progressing         "

## 2024-05-18 NOTE — H&P
[facility] [place]       Hospitalist History & Physical     Assessment & Plan     ASSESSMENT        PLAN        DVT Prophy    Disposition      Bolivar Shaffer MD    History of Present Illness     Steve Morgan is a 64 year old      Review of Systems     A Comprehensive greater than 10 system review of systems was carried out.  Pertinent positives and negatives are noted above.  Otherwise negative for contributory information.     Past Medical History     Past Medical History:   Diagnosis Date    Cellulitis and abscess of trunk 6/27/2017    Depression     Hyperlipidemia LDL goal <100 10/31/2010    Hypertension goal BP (blood pressure) < 140/90 3/17/2011    Hypothyroidism 1/12/2010    Morbid obesity due to excess calories (H) 1/12/2010    Neuropathy in diabetes (H) 1/12/2010    Obesity 1/12/2010    Sleep apnea 1/12/2010    CPAP    Type 2 diabetes, HbA1C goal < 8% (H) 3/8/2011     Medications     Current Outpatient Medications   Medication Sig Dispense Refill    acetaminophen (TYLENOL) 325 MG tablet Take 2 tablets (650 mg) by mouth every 4 hours as needed for pain or fever 100 tablet 0    ASPIRIN 81 MG OR TABS Take 81 mg by mouth every evening 100 3    atenolol (TENORMIN) 25 MG tablet TAKE 1 TABLET BY MOUTH EVERY DAY 90 tablet 1    buPROPion (WELLBUTRIN XL) 150 MG 24 hr tablet Take 1 tablet (150 mg) by mouth every morning 90 tablet 1    Calcium Carb-Cholecalciferol (CALCIUM 600 + D PO) Take 1 tablet by mouth daily      Continuous Blood Gluc  (FREESTYLE GIULIANA 2 READER) SIENA 1 Device continuous 1 each 0    Continuous Blood Gluc Sensor (FREESTYLE GIULIANA 2 SENSOR) MISC 1 each every 14 days Use 1 sensor every 14 days. Use to read blood sugars per 's instructions. 2 each 11    Cyanocobalamin (VITAMIN B 12 PO) Take 250 mcg by mouth daily      famotidine (PEPCID) 40 MG tablet TAKE 1 TABLET BY MOUTH EVERY DAY 90 tablet 1    ferrous sulfate 325 (65 FE) MG tablet Take 1 tablet by mouth daily (with  breakfast).      finasteride (PROSCAR) 5 MG tablet TAKE 1 TABLET BY MOUTH EVERY DAY 90 tablet 1    gabapentin (NEURONTIN) 300 MG capsule Take 1 capsule (300 mg) by mouth 3 times daily for 5 days 15 capsule 0    glipiZIDE (GLUCOTROL XL) 10 MG 24 hr tablet TAKE 1 TABLET (10 MG) BY MOUTH DAILY. 90 tablet 7    ibuprofen (ADVIL/MOTRIN) 200 MG tablet Take 400 mg by mouth every 6 hours as needed for pain (Patient not taking: Reported on 4/10/2024)      INSULIN GLARGINE 100 UNIT/ML pen INJECT 40 UNITS SUBCUTANEOUS DAILY 30 mL 0    insulin pen needle (B-D U/F) 31G X 5 MM miscellaneous USE 4 DAILY OR AS DIRECTED. 400 each 0    latanoprost (XALATAN) 0.005 % ophthalmic solution INSTILL 1 DROP INTO BOTH EYES IN THE EVENING      LEVOXYL 137 MCG tablet TAKE 1 TABLET BY MOUTH EVERY DAY 30 tablet 0    losartan (COZAAR) 100 MG tablet Take 1 tablet (100 mg) by mouth daily 90 tablet 1    metFORMIN (GLUCOPHAGE) 1000 MG tablet TAKE 1 TABLET BY MOUTH TWICE A DAY WITH MEALS 180 tablet 0    methocarbamol (ROBAXIN) 500 MG tablet Take 1 tablet (500 mg) by mouth 4 times daily as needed for muscle spasms (Patient not taking: Reported on 4/10/2024) 15 tablet 0    mirtazapine (REMERON) 15 MG tablet Take 1 tablet (15 mg) by mouth at bedtime 30 tablet 2    mirtazapine (REMERON) 45 MG tablet Take 1 tablet (45 mg) by mouth at bedtime Take along with a 15 mg tablet for a total of 60 mg nightly 30 tablet 2    MULTI-VITAMIN OR TABS Take 1 tablet by mouth daily 30 0    mupirocin (BACTROBAN) 2 % external ointment Apply topically 3 times daily To finger (Patient not taking: Reported on 4/10/2024) 22 g 1    pantoprazole (PROTONIX) 40 MG EC tablet Take 1 tablet (40 mg) by mouth daily as needed for heartburn 90 tablet 1    simvastatin (ZOCOR) 20 MG tablet Take 1 tablet (20 mg) by mouth At Bedtime 90 tablet 1    Urea (URE-NA) 15 g PACK packet Take 15 g by mouth daily (Patient not taking: Reported on 11/28/2023) 30 packet 0    VITAMIN D, CHOLECALCIFEROL, PO  "Take 1,000 Units by mouth daily. (Patient not taking: Reported on 4/10/2024)        Past Surgical History     Past Surgical History:   Procedure Laterality Date    BIOPSY      COLONOSCOPY      EYE SURGERY      GENITOURINARY SURGERY      surg for undescended testicle    IRRIGATION AND DEBRIDEMENT HAND, COMBINED Right 12/23/2022    Procedure: Right long finger irrigation and debridement with partial amputation of the right long finger. ;  Surgeon: Colby English MD;  Location: [facility] OR    REPAIR HAMMER TOE BILATERAL  5/16/2013    Procedure: REPAIR HAMMER TOE BILATERAL;  Flexor Tenotomy Toes 2,3,4,5 Bilateral Feet;  Surgeon: Saad Bangura DPM;  Location: [facility] OR     Family History     Family History   Problem Relation Age of Onset    Breast Cancer Mother     Hypertension Mother     Thyroid Disease Mother     Depression Mother     Alzheimer Disease Mother 82    Cancer - colorectal Father     Thyroid Disease Father     Depression Father     Other - See Comments Father         bladder polyps    Heart Disease Maternal Grandmother         CHF    Circulatory Paternal Grandmother     Cancer Paternal Grandfather     Diabetes Brother     Diabetes Brother     Asthma Brother      Allergies   No Known Allergies    Social History     Social History     Tobacco Use    Smoking status: Never    Smokeless tobacco: Never   Substance Use Topics    Alcohol use: Yes     Comment: Occassionally     Physical Exam   Blood pressure (!) 141/72, pulse 85, temperature 99.9  F (37.7  C), temperature source Oral, resp. rate 18, height 1.651 m (5' 5\"), weight 113.4 kg (250 lb), SpO2 92%.    General: Ill appearing, cooperative with exam, in NAD.  HEENT: Atraumatic. No erythema in posterior pharynx.  Lymph: No cervical or inguinal lymphadenopathy.  Cardiac: RRR. No murmurs.  Lungs: CTAB. Nl WOB.  Abd: Non-tender. No rebound or gaurding. Nl bowel sounds.  Ext: No edema. 2+ pulses.  Skin: No rashes, abrasions, or contusions.  Psych: A&Ox3. Nl " affect.  Neuro: 5/5 strength. Sensation intact.    Labs & Imaging     Reviewed and Pertinent results discussed in assessment and plan.

## 2024-05-18 NOTE — PHARMACY-ADMISSION MEDICATION HISTORY
Pharmacy Intern Admission Medication History    Admission medication history is complete. The information provided in this note is only as accurate as the sources available at the time of the update.    Information Source(s): Patient and CareEverywhere/SureScripts via in-person    Pertinent Information: Pt only takes brand name for LEVOXYL 135 mcg tablet and not generic    Changes made to PTA medication list:  Added: None  Deleted: B12, Gabapentin, ibuprofen, Robaxin, mupirocin, Urea, Vitamin D  Changed: Tylenol    Allergies reviewed with patient and updates made in EHR: yes    Medication History Completed By: Javier Alonzo 5/18/2024 11:34 AM    PTA Med List   Medication Sig Last Dose    acetaminophen (TYLENOL) 500 MG tablet Take 1,000 mg by mouth every 6 hours as needed for mild pain Unknown at -    ASPIRIN 81 MG OR TABS Take 81 mg by mouth every evening Past Week at pm    atenolol (TENORMIN) 25 MG tablet TAKE 1 TABLET BY MOUTH EVERY DAY Past Week at -    buPROPion (WELLBUTRIN XL) 150 MG 24 hr tablet Take 1 tablet (150 mg) by mouth every morning Past Week at -    Calcium Carb-Cholecalciferol (CALCIUM 600 + D PO) Take 1 tablet by mouth daily Past Week at -    Continuous Blood Gluc  (FREESTYLE GIULIANA 2 READER) SIENA 1 Device continuous DME at [facility] [place]    Continuous Blood Gluc Sensor (FREESTYLE GIULIANA 2 SENSOR) MISC 1 each every 14 days Use 1 sensor every 14 days. Use to read blood sugars per 's instructions. DME at DME    famotidine (PEPCID) 40 MG tablet TAKE 1 TABLET BY MOUTH EVERY DAY Past Week at -    ferrous sulfate 325 (65 FE) MG tablet Take 1 tablet by mouth daily (with breakfast). Past Week at -    finasteride (PROSCAR) 5 MG tablet Take 5 mg by mouth at bedtime Past Week at PM    glipiZIDE (GLUCOTROL XL) 10 MG 24 hr tablet TAKE 1 TABLET (10 MG) BY MOUTH DAILY. Past Week at -    insulin glargine 100 UNIT/ML pen Inject 40 Units Subcutaneous at bedtime 5/15/2024 at PM    insulin pen needle (B-D U/F)  31G X 5 MM miscellaneous USE 4 DAILY OR AS DIRECTED. DME at DME    latanoprost (XALATAN) 0.005 % ophthalmic solution INSTILL 1 DROP INTO BOTH EYES IN THE EVENING Past Week at -    LEVOXYL 137 MCG tablet TAKE 1 TABLET BY MOUTH EVERY DAY Past Week at AM    losartan (COZAAR) 100 MG tablet Take 1 tablet (100 mg) by mouth daily Past Week at -    metFORMIN (GLUCOPHAGE) 1000 MG tablet TAKE 1 TABLET BY MOUTH TWICE A DAY WITH MEALS Past Week at PM    mirtazapine (REMERON) 15 MG tablet Take 1 tablet (15 mg) by mouth at bedtime Past Week at PM    mirtazapine (REMERON) 45 MG tablet Take 1 tablet (45 mg) by mouth at bedtime Take along with a 15 mg tablet for a total of 60 mg nightly Past Week at PM    MULTI-VITAMIN OR TABS Take 1 tablet by mouth daily Past Week at -    pantoprazole (PROTONIX) 40 MG EC tablet Take 1 tablet (40 mg) by mouth daily as needed for heartburn Past Week at -    simvastatin (ZOCOR) 20 MG tablet Take 20 mg by mouth daily Past Week at -

## 2024-05-18 NOTE — PLAN OF CARE
"Goal Outcome Evaluation:      Plan of Care Reviewed With: patient    Overall Patient Progress: improvingOverall Patient Progress: improving    Outcome Evaluation: PRN tylenol for pain. NPO at midnight for possible prodecure tomorrow.    A & Ox4, able to make needs known. Tolerating moderate carb diet. A2 w/walker. Voiding. QID/HS BS. Pain in knee w/movement. PRN tylenol given. IV ABX given.       Problem: Adult Inpatient Plan of Care  Goal: Plan of Care Review  Description: The Plan of Care Review/Shift note should be completed every shift.  The Outcome Evaluation is a brief statement about your assessment that the patient is improving, declining, or no change.  This information will be displayed automatically on your shift  note.  Outcome: Progressing  Flowsheets (Taken 5/18/2024 1353)  Outcome Evaluation: PRN tylenol for pain. NPO at midnight for possible prodecure tomorrow.  Plan of Care Reviewed With: patient  Overall Patient Progress: improving  Goal: Patient-Specific Goal (Individualized)  Description: You can add care plan individualizations to a care plan. Examples of Individualization might be:  \"Parent requests to be called daily at 9am for status\", \"I have a hard time hearing out of my right ear\", or \"Do not touch me to wake me up as it startles  me\".  Outcome: Progressing  Goal: Absence of Hospital-Acquired Illness or Injury  Outcome: Progressing  Intervention: Identify and Manage Fall Risk  Recent Flowsheet Documentation  Taken 5/18/2024 0900 by Sabrina Abarca RN  Safety Promotion/Fall Prevention:   activity supervised   assistive device/personal items within reach   clutter free environment maintained   nonskid shoes/slippers when out of bed   safety round/check completed  Intervention: Prevent Skin Injury  Recent Flowsheet Documentation  Taken 5/18/2024 0900 by Sabrina Abarca RN  Skin Protection: adhesive use limited  Device Skin Pressure Protection:   absorbent pad utilized/changed   adhesive use " limited  Intervention: Prevent Infection  Recent Flowsheet Documentation  Taken 5/18/2024 0900 by Sabrina Abarca RN  Infection Prevention:   hand hygiene promoted   equipment surfaces disinfected   personal protective equipment utilized  Goal: Optimal Comfort and Wellbeing  Outcome: Progressing  Intervention: Monitor Pain and Promote Comfort  Recent Flowsheet Documentation  Taken 5/18/2024 0848 by Sabrina Abarca RN  Pain Management Interventions: declines  Goal: Readiness for Transition of Care  Outcome: Progressing  Flowsheets (Taken 5/18/2024 1353)  Anticipated Changes Related to Illness: none  Intervention: Mutually Develop Transition Plan  Recent Flowsheet Documentation  Taken 5/18/2024 1353 by Sabrina Abarca RN  Anticipated Changes Related to Illness: none  Taken 5/18/2024 1200 by Sabrina Abarca RN  Equipment Currently Used at Home:   cane, straight   walker, rolling     Problem: Skin Injury Risk Increased  Goal: Skin Health and Integrity  Outcome: Progressing  Intervention: Plan: Nurse Driven Intervention: Moisture Management  Recent Flowsheet Documentation  Taken 5/18/2024 0900 by Sabrina Abarca RN  Moisture Interventions:   Encourage regular toileting   No brief in bed   Incontinence pad  Intervention: Optimize Skin Protection  Recent Flowsheet Documentation  Taken 5/18/2024 1243 by Sabrina Abarca RN  Activity Management: activity adjusted per tolerance  Taken 5/18/2024 0900 by Sabrina Abarca RN  Skin Protection: adhesive use limited     Problem: Skin or Soft Tissue Infection  Goal: Absence of Infection Signs and Symptoms  Outcome: Progressing  Intervention: Minimize and Manage Infection Progression  Recent Flowsheet Documentation  Taken 5/18/2024 0900 by Sabrina Abarca RN  Infection Prevention:   hand hygiene promoted   equipment surfaces disinfected   personal protective equipment utilized  Isolation Precautions: contact precautions maintained

## 2024-05-19 ENCOUNTER — APPOINTMENT (OUTPATIENT)
Dept: PHYSICAL THERAPY | Facility: CLINIC | Age: 64
DRG: 501 | End: 2024-05-19
Payer: MEDICARE

## 2024-05-19 LAB
CREAT SERPL-MCNC: 1.75 MG/DL (ref 0.67–1.17)
EGFRCR SERPLBLD CKD-EPI 2021: 42 ML/MIN/1.73M2
GLUCOSE BLDC GLUCOMTR-MCNC: 190 MG/DL (ref 70–99)
GLUCOSE BLDC GLUCOMTR-MCNC: 242 MG/DL (ref 70–99)
GLUCOSE BLDC GLUCOMTR-MCNC: 247 MG/DL (ref 70–99)
GLUCOSE BLDC GLUCOMTR-MCNC: 252 MG/DL (ref 70–99)
GLUCOSE BLDC GLUCOMTR-MCNC: 264 MG/DL (ref 70–99)
GLUCOSE BLDC GLUCOMTR-MCNC: 271 MG/DL (ref 70–99)
GLUCOSE SERPL-MCNC: 262 MG/DL (ref 70–99)

## 2024-05-19 PROCEDURE — 250N000013 HC RX MED GY IP 250 OP 250 PS 637: Performed by: INTERNAL MEDICINE

## 2024-05-19 PROCEDURE — 99233 SBSQ HOSP IP/OBS HIGH 50: CPT | Performed by: INTERNAL MEDICINE

## 2024-05-19 PROCEDURE — 82565 ASSAY OF CREATININE: CPT | Performed by: INTERNAL MEDICINE

## 2024-05-19 PROCEDURE — 82947 ASSAY GLUCOSE BLOOD QUANT: CPT | Performed by: INTERNAL MEDICINE

## 2024-05-19 PROCEDURE — 36415 COLL VENOUS BLD VENIPUNCTURE: CPT | Performed by: INTERNAL MEDICINE

## 2024-05-19 PROCEDURE — 120N000001 HC R&B MED SURG/OB

## 2024-05-19 PROCEDURE — 97530 THERAPEUTIC ACTIVITIES: CPT | Mod: GP

## 2024-05-19 PROCEDURE — 250N000011 HC RX IP 250 OP 636: Performed by: INTERNAL MEDICINE

## 2024-05-19 PROCEDURE — 250N000012 HC RX MED GY IP 250 OP 636 PS 637: Performed by: INTERNAL MEDICINE

## 2024-05-19 RX ORDER — OXYCODONE HYDROCHLORIDE 5 MG/1
5 TABLET ORAL EVERY 4 HOURS PRN
Status: DISCONTINUED | OUTPATIENT
Start: 2024-05-19 | End: 2024-05-20

## 2024-05-19 RX ORDER — NALOXONE HYDROCHLORIDE 0.4 MG/ML
0.4 INJECTION, SOLUTION INTRAMUSCULAR; INTRAVENOUS; SUBCUTANEOUS
Status: DISCONTINUED | OUTPATIENT
Start: 2024-05-19 | End: 2024-05-22 | Stop reason: HOSPADM

## 2024-05-19 RX ORDER — NALOXONE HYDROCHLORIDE 0.4 MG/ML
0.2 INJECTION, SOLUTION INTRAMUSCULAR; INTRAVENOUS; SUBCUTANEOUS
Status: DISCONTINUED | OUTPATIENT
Start: 2024-05-19 | End: 2024-05-22 | Stop reason: HOSPADM

## 2024-05-19 RX ORDER — DEXTROSE MONOHYDRATE 25 G/50ML
25-50 INJECTION, SOLUTION INTRAVENOUS
Status: DISCONTINUED | OUTPATIENT
Start: 2024-05-19 | End: 2024-05-19

## 2024-05-19 RX ORDER — NICOTINE POLACRILEX 4 MG
15-30 LOZENGE BUCCAL
Status: DISCONTINUED | OUTPATIENT
Start: 2024-05-19 | End: 2024-05-19

## 2024-05-19 RX ADMIN — SENNOSIDES AND DOCUSATE SODIUM 2 TABLET: 50; 8.6 TABLET ORAL at 22:42

## 2024-05-19 RX ADMIN — INSULIN GLARGINE 40 UNITS: 100 INJECTION, SOLUTION SUBCUTANEOUS at 09:33

## 2024-05-19 RX ADMIN — MIRTAZAPINE 45 MG: 15 TABLET, FILM COATED ORAL at 22:11

## 2024-05-19 RX ADMIN — SENNOSIDES AND DOCUSATE SODIUM 2 TABLET: 50; 8.6 TABLET ORAL at 13:34

## 2024-05-19 RX ADMIN — FAMOTIDINE 40 MG: 20 TABLET ORAL at 08:57

## 2024-05-19 RX ADMIN — METFORMIN HYDROCHLORIDE 1000 MG: 500 TABLET ORAL at 08:57

## 2024-05-19 RX ADMIN — LATANOPROST 1 DROP: 50 SOLUTION/ DROPS OPHTHALMIC at 22:11

## 2024-05-19 RX ADMIN — FERROUS SULFATE TAB 325 MG (65 MG ELEMENTAL FE) 325 MG: 325 (65 FE) TAB at 08:58

## 2024-05-19 RX ADMIN — LEVOTHYROXINE SODIUM 137 MCG: 0.11 TABLET ORAL at 08:57

## 2024-05-19 RX ADMIN — VANCOMYCIN HYDROCHLORIDE 1000 MG: 1 INJECTION, SOLUTION INTRAVENOUS at 10:53

## 2024-05-19 RX ADMIN — MIRTAZAPINE 15 MG: 15 TABLET, FILM COATED ORAL at 22:10

## 2024-05-19 RX ADMIN — FINASTERIDE 5 MG: 5 TABLET, FILM COATED ORAL at 22:11

## 2024-05-19 RX ADMIN — SIMVASTATIN 20 MG: 10 TABLET, FILM COATED ORAL at 08:57

## 2024-05-19 RX ADMIN — ATENOLOL 25 MG: 25 TABLET ORAL at 08:58

## 2024-05-19 RX ADMIN — LOSARTAN POTASSIUM 100 MG: 100 TABLET, FILM COATED ORAL at 08:57

## 2024-05-19 RX ADMIN — ONDANSETRON 4 MG: 2 INJECTION INTRAMUSCULAR; INTRAVENOUS at 19:42

## 2024-05-19 RX ADMIN — METFORMIN HYDROCHLORIDE 1000 MG: 500 TABLET ORAL at 17:43

## 2024-05-19 RX ADMIN — BUPROPION HYDROCHLORIDE 150 MG: 150 TABLET, EXTENDED RELEASE ORAL at 08:57

## 2024-05-19 ASSESSMENT — ACTIVITIES OF DAILY LIVING (ADL)
ADLS_ACUITY_SCORE: 32
ADLS_ACUITY_SCORE: 32
ADLS_ACUITY_SCORE: 31
ADLS_ACUITY_SCORE: 32
ADLS_ACUITY_SCORE: 31
ADLS_ACUITY_SCORE: 32
ADLS_ACUITY_SCORE: 31
ADLS_ACUITY_SCORE: 32

## 2024-05-19 NOTE — PLAN OF CARE
Goal Outcome Evaluation:      Plan of Care Reviewed With: patient    Overall Patient Progress: no changeOverall Patient Progress: no change    Outcome Evaluation: no pain at rest, increases with putting weight on it or walking. no surgery today. skin is light pink, mild swollen, tender , ice to knee. bs 252.    VS-stable afebrile,   Lung Sounds-clear, no cough, on room air. Taking deep breathes.   O2-on room air.   GI-+Bs. +flatus. Lbm was Wednesday. Pt did have a small formed stool this early afternoon. Stomach still feels gurgly.  Pt does have baseline constipation . 2 senna given this afternoon. On mod cho diet. Tolerating well. On insulin and lantus. Ate bkst. Did not eat lunch. Bs was 253 and 264 added carb count to lunch, but pt did not eat lulnch.   -voiding in bathroom.   IVF-sl on iv vanco.   Dressings-open to air. R knee is light pink. Mild swollen, tender, warm.   CMS-Strong hand grasp. Strong planter flexion, mod dorsi. Pain increased with weight bearing. Ice to knee.   Drain-none.   Activity-up with 1 assist. Sitting in chair for meals. Walking into the bathroom for voiding.   Pain-increased with moving or bearing weight. Ice packs applied.   D/C Plan-home when able. Lives in house.     Patient vital signs are at baseline: Yes  Patient able to ambulate as they were prior to admission or with assist devices provided by therapies during their stay:  Yes--uses cane/walker at home.   Patient MUST void prior to discharge:  Yes  Patient able to tolerate oral intake:  Yes  Pain has adequate pain control using Oral analgesics:  Yes  Does patient have an identified :  Yes--wife can help at home. Son lives at home as well.  Has goal D/C date and time been discussed with patient:  Yes-- no surgery today. Will monitor. On iv vanco.         Problem: Adult Inpatient Plan of Care  Goal: Plan of Care Review  Description: The Plan of Care Review/Shift note should be completed every shift.  The Outcome Evaluation  "is a brief statement about your assessment that the patient is improving, declining, or no change.  This information will be displayed automatically on your shift  note.  Outcome: Progressing  Flowsheets (Taken 5/19/2024 1102)  Outcome Evaluation: no pain at rest, increases with putting weight on it or walking. no surgery today. skin is light pink, mild swollen, tender , ice to knee. bs 252.  Plan of Care Reviewed With: patient  Overall Patient Progress: no change  Goal: Patient-Specific Goal (Individualized)  Description: You can add care plan individualizations to a care plan. Examples of Individualization might be:  \"Parent requests to be called daily at 9am for status\", \"I have a hard time hearing out of my right ear\", or \"Do not touch me to wake me up as it startles  me\".  Outcome: Progressing  Goal: Absence of Hospital-Acquired Illness or Injury  Outcome: Progressing  Intervention: Identify and Manage Fall Risk  Recent Flowsheet Documentation  Taken 5/19/2024 0900 by Meg Vazquez RN  Safety Promotion/Fall Prevention:   activity supervised   assistive device/personal items within reach   clutter free environment maintained   safety round/check completed  Intervention: Prevent Skin Injury  Recent Flowsheet Documentation  Taken 5/19/2024 0900 by Meg Vazquez RN  Body Position: position changed independently  Skin Protection: adhesive use limited  Device Skin Pressure Protection:   absorbent pad utilized/changed   adhesive use limited   tubing/devices free from skin contact  Intervention: Prevent and Manage VTE (Venous Thromboembolism) Risk  Recent Flowsheet Documentation  Taken 5/19/2024 0900 by Meg Vazquez RN  VTE Prevention/Management: SCDs (sequential compression devices) off  Intervention: Prevent Infection  Recent Flowsheet Documentation  Taken 5/19/2024 0900 by Meg Vazquez RN  Infection Prevention:   hand hygiene promoted   equipment surfaces disinfected   personal protective equipment " utilized  Goal: Optimal Comfort and Wellbeing  Outcome: Progressing  Goal: Readiness for Transition of Care  Outcome: Progressing

## 2024-05-19 NOTE — PLAN OF CARE
"Goal Outcome Evaluation:      Plan of Care Reviewed With: patient    Overall Patient Progress: improvingOverall Patient Progress: improving       Care Continued from 19:00-7:30    Events this shift    Orientation   BP (!) 142/51 (BP Location: Left arm)   Pulse 93   Temp 97.3  F (36.3  C) (Temporal)   Resp 20   Ht 1.651 m (5' 5\")   Wt 113.4 kg (250 lb)   SpO2 97%   BMI 41.60 kg/m    O2: RA  B AM  247  Pain: Denies while laying in bed  Ambulation: A1 wgb  Voiding: bathroom  Skin: R knee scab  Cms:  Baseline neuropathy   Gtts: SL      Plan:  NPO since midnight, possible I&D today. Pain continues when walking. Patient is calm and cooperative Discharge to be determined depending on I/D      Problem: Adult Inpatient Plan of Care  Goal: Plan of Care Review  Description: The Plan of Care Review/Shift note should be completed every shift.  The Outcome Evaluation is a brief statement about your assessment that the patient is improving, declining, or no change.  This information will be displayed automatically on your shift  note.  Outcome: Progressing  Flowsheets (Taken 2024)  Plan of Care Reviewed With: patient  Overall Patient Progress: improving  Goal: Patient-Specific Goal (Individualized)  Description: You can add care plan individualizations to a care plan. Examples of Individualization might be:  \"Parent requests to be called daily at 9am for status\", \"I have a hard time hearing out of my right ear\", or \"Do not touch me to wake me up as it startles  me\".  Outcome: Progressing  Goal: Absence of Hospital-Acquired Illness or Injury  Outcome: Progressing  Intervention: Identify and Manage Fall Risk  Recent Flowsheet Documentation  Taken 2024 by Lorenzo Pedraza, RN  Safety Promotion/Fall Prevention:   activity supervised   assistive device/personal items within reach   clutter free environment maintained   safety round/check completed  Intervention: Prevent Skin Injury  Recent Flowsheet " Documentation  Taken 5/18/2024 1956 by Lorenzo Pedraza RN  Body Position: position changed independently  Skin Protection: adhesive use limited  Device Skin Pressure Protection:   absorbent pad utilized/changed   adhesive use limited   tubing/devices free from skin contact  Intervention: Prevent and Manage VTE (Venous Thromboembolism) Risk  Recent Flowsheet Documentation  Taken 5/18/2024 1956 by Lorenzo Pedraza RN  VTE Prevention/Management: patient refused intervention  Intervention: Prevent Infection  Recent Flowsheet Documentation  Taken 5/18/2024 1956 by Lorenzo Pedraza RN  Infection Prevention:   hand hygiene promoted   equipment surfaces disinfected   personal protective equipment utilized  Goal: Optimal Comfort and Wellbeing  Outcome: Progressing  Goal: Readiness for Transition of Care  Outcome: Progressing

## 2024-05-19 NOTE — CONSULTS
ORTHOPAEDIC SURGERY CONSULTATION NOTE    Subjective  CONSULTING PROVIDER:  Justni Bo MD    HISTORY OF PRESENT ILLNESS  Steve Morgan is a 64 year old male who presents for evaluation of right knee pain.  While he was admitted with leukocytosis and evidence of prepatellar swelling and cellulitis 2 days prior.  He has a history of type 2 diabetes, stage III kidney failure, morbid obesity and hypertension.  The prepatellar bursa was aspirated and sent for fluid analysis.  He does have a history of MRSA.  Vancomycin was initiated yesterday and he feels there has been mild improvement in his knee pain.  He does have chronic neuropathy with minimal sensation below his toes.  Has been ambulatory since in the hospital.  Orthopedic surgery consulted for consideration of intervention    MEDICAL HISTORY  Past Medical History:   Diagnosis Date    Cellulitis and abscess of trunk 6/27/2017    Depression     Hyperlipidemia LDL goal <100 10/31/2010    Hypertension goal BP (blood pressure) < 140/90 3/17/2011    Hypothyroidism 1/12/2010    Morbid obesity due to excess calories (H) 1/12/2010    Neuropathy in diabetes (H) 1/12/2010    Obesity 1/12/2010    Sleep apnea 1/12/2010    CPAP    Type 2 diabetes, HbA1C goal < 8% (H) 3/8/2011         SURGICAL HISTORY  Past Surgical History:   Procedure Laterality Date    BIOPSY      COLONOSCOPY      EYE SURGERY      GENITOURINARY SURGERY      surg for undescended testicle    IRRIGATION AND DEBRIDEMENT HAND, COMBINED Right 12/23/2022    Procedure: Right long finger irrigation and debridement with partial amputation of the right long finger. ;  Surgeon: Colby English MD;  Location:  OR    REPAIR HAMMER TOE BILATERAL  5/16/2013    Procedure: REPAIR HAMMER TOE BILATERAL;  Flexor Tenotomy Toes 2,3,4,5 Bilateral Feet;  Surgeon: Saad Bangura DPM;  Location:  OR       CURRENT MEDICATIONS    Current Facility-Administered Medications:     acetaminophen (TYLENOL) tablet 650 mg, 650 mg,  Oral, Q4H PRN, 650 mg at 05/18/24 0848 **OR** acetaminophen (TYLENOL) Suppository 650 mg, 650 mg, Rectal, Q4H PRN, Mirtha Vargas MD    atenolol (TENORMIN) tablet 25 mg, 25 mg, Oral, Daily, John Coleman MD    buPROPion (WELLBUTRIN XL) 24 hr tablet 150 mg, 150 mg, Oral, QAM, John Coleman MD    calcium carbonate (TUMS) chewable tablet 1,000 mg, 1,000 mg, Oral, 4x Daily PRN, John Coleman MD    glucose gel 15-30 g, 15-30 g, Oral, Q15 Min PRN **OR** dextrose 50 % injection 25-50 mL, 25-50 mL, Intravenous, Q15 Min PRN **OR** glucagon injection 1 mg, 1 mg, Subcutaneous, Q15 Min PRN, Mirtha Vargas MD    famotidine (PEPCID) tablet 40 mg, 40 mg, Oral, Daily, Mirtha Vargas MD, 40 mg at 05/18/24 1447    ferrous sulfate (FEROSUL) tablet 325 mg, 325 mg, Oral, Daily with breakfast, Mirtha Vargas MD    finasteride (PROSCAR) tablet 5 mg, 5 mg, Oral, Daily, John Coleman MD    gabapentin (NEURONTIN) capsule 300 mg, 300 mg, Oral, TID, Mirtha Vargas MD    insulin aspart (NovoLOG) injection (RAPID ACTING), 1-7 Units, Subcutaneous, TID AC, Mirtha Vargas MD, 3 Units at 05/18/24 1805    insulin aspart (NovoLOG) injection (RAPID ACTING), 1-5 Units, Subcutaneous, At Bedtime, Mirtha Vargas MD, 3 Units at 05/18/24 2217    insulin glargine (BASAGLAR PEN) injection 40 Units, 40 Units, Subcutaneous, Daily, John Coleman MD    latanoprost (XALATAN) 0.005 % ophthalmic solution 1 drop, 1 drop, Both Eyes, At Bedtime, John Coleman MD, 1 drop at 05/18/24 2221    levothyroxine (SYNTHROID/LEVOTHROID) tablet 137 mcg, 137 mcg, Oral, Daily, Mirtha Vargas MD, 137 mcg at 05/18/24 1447    losartan (COZAAR) tablet 100 mg, 100 mg, Oral, Daily, Mirtha Vargas MD, 100 mg at 05/18/24 1447    metFORMIN (GLUCOPHAGE) tablet 1,000 mg, 1,000 mg, Oral, BID w/meals, Mirtha Vargas MD, 1,000 mg at 05/18/24 1804    mirtazapine (REMERON) tablet 15 mg, 15 mg, Oral,  At Bedtime, Mirtha Varags MD, 15 mg at 05/18/24 2217    mirtazapine (REMERON) tablet 45 mg, 45 mg, Oral, At Bedtime, Mirtha Vargas MD, 45 mg at 05/18/24 2219    naloxone (NARCAN) injection 0.2 mg, 0.2 mg, Intravenous, Q2 Min PRN **OR** naloxone (NARCAN) injection 0.4 mg, 0.4 mg, Intravenous, Q2 Min PRN **OR** naloxone (NARCAN) injection 0.2 mg, 0.2 mg, Intramuscular, Q2 Min PRN **OR** naloxone (NARCAN) injection 0.4 mg, 0.4 mg, Intramuscular, Q2 Min PRN, John Coleman MD    ondansetron (ZOFRAN ODT) ODT tab 4 mg, 4 mg, Oral, Q6H PRN, 4 mg at 05/18/24 1910 **OR** ondansetron (ZOFRAN) injection 4 mg, 4 mg, Intravenous, Q6H PRN, John Coleman MD    oxyCODONE (ROXICODONE) tablet 5 mg, 5 mg, Oral, Q4H PRN, Maxwell Cantrell MD    oxyCODONE IR (ROXICODONE) half-tab 2.5 mg, 2.5 mg, Oral, Q4H PRN, Maxwell Cantrell MD    senna-docusate (SENOKOT-S/PERICOLACE) 8.6-50 MG per tablet 1 tablet, 1 tablet, Oral, BID PRN **OR** senna-docusate (SENOKOT-S/PERICOLACE) 8.6-50 MG per tablet 2 tablet, 2 tablet, Oral, BID PRN, John Coleman MD    simvastatin (ZOCOR) tablet 20 mg, 20 mg, Oral, Daily, Mirtha Vargas MD, 20 mg at 05/18/24 1447    vancomycin (VANCOCIN) 1,000 mg in 200 mL dextrose intermittent infusion, 1,000 mg, Intravenous, Q24H, John Coleman MD, Last Rate: 200 mL/hr at 05/18/24 1122, 1,000 mg at 05/18/24 1122    SOCIAL HISTORY    Tobacco history is   History   Smoking Status    Never   Smokeless Tobacco    Never   .    REVIEW OF SYSTEMS  Constitutional: Negative for chills, fever, nausea, vomiting.  Objective   VITAL SIGNS  BP (!) 165/62 (05/19/24 0837) Temp 97.9  F (36.6  C) (05/19/24 0837)  Pulse   Resp 20 (05/19/24 0837)  SpO2 98 % (05/19/24 0837)  Body mass index is 41.6 kg/m .    PHYSICAL EXAMINATION  Orthopedic Physical Examination_  General Appearance: Patient appears active, comfortable, no acute distress.  NEURO: The patient is alert and  oriented to person, place, and time and able to follow commands.  HEENT: Head normocephalic, atraumatic.  CVS: No cyanosis or obvious jugular venous distension .  Resp: No acute respiratory distress or increased inspiratory effort. Musculoskeletal:  The right lower extremity:  Skin clean dry and intact  5 cm area of induration and fluctuance over the prepatellar bursa.  Improving erythema from prior assessments for per chart review  Neurologic: Patient demonstrates active extensor hallucis longus, flexor hallucis longus, tibialis anterior, gastrocsoleus complex.  Diminished sensation to light touch through the deep peroneal, superficial peroneal, sural, saphenous, and tibial nerve distributions of the affected leg. At baseline    DIAGNOSTICS  IMAGING:  Right knee series obtained  available for review.  Well-maintained joint spaces.  Soft tissue swelling over the anterior aspect of the patella    Assessment & Plan   64-year-old male with prepatellar septic bursitis    PLAN:  I discussed with Caio treatment options for prepatellar bursitis.  He has noted mild improvement since antibiotics were changed to vancomycin.  Erythema has decreased per chart review.  Currently afebrile and awaiting morning labs today.  I think with the mild improvement, will allow for diet today.  Will continue to monitor.  I would like to make him n.p.o. at midnight and consideration of formal debridement in the operating room today.  Earliest possible would be late afternoon and therefore I think best to simply schedule this for tomorrow if needed.  I instructed Caio to inform nursing staff if he notices improvement or worsening of his pain this evening.  This would help guide our consideration of debridement.  He was in agreement with this plan. All patient questions were answered to the best of my ability at this visit. Thank you for consultation of this very pleasant patient    ADMINISTRATIVE/BILLIN minutes were spent in care  of this patient greater than 30 of which were spent in counseling, imaging review and coordination of care

## 2024-05-19 NOTE — PROGRESS NOTES
[facility]    Medicine Progress Note - Hospitalist Service    Date of Admission:  5/17/2024    Assessment & Plan   64M with hx of IDDM Type II, CKD Stage III, morbid obesity BMI 41, and hypertension presents with right knee pain and swelling. On adm, leukocytosis but no other signs of sepsis. XR of right knee with evidence of prepatellar swelling but no joint effusion.  Patellar aspiration performed with 5 mL cloudy fluid removed and sent for analysis   Aspirate is not demonstrating gram-positive cocci and patient has a history of MRSA.    1/.  Prepatellar bursitis with concerns for MRSA infection.  Discontinue ceftriaxone  -Remain on systemic IV vancomycin  -Awaiting for sensitivity panel for earlier Staph aureus seen with knee aspiration culture  -Blood cultures remain no growth up-to-date  Formal consult pending at the time of this note.  -Closely monitor infectious markers  -Monitor kidney function while on systemic vancomycin with history of CKD and longstanding diabetes mellitus  -Will ask for formal infectious disease input    2/.  Type 2 diabetes mellitus probably ongoing for 15 to 20 years with multiple endorgan issues including peripheral neuropathy and CKD.  Resume several of the home medications continue with Accu-Cheks and sliding scale insulin.  -Currently n.p.o. but should receive case long-acting insulin on modified doses    3/.  Peripheral neuropathy significant in nature, falls precautions to be instituted discussed with patient and bedside nursing stating that he does need to use his walker and ask for help when he is trying to move around.    4/.  Other medical issues including dyslipidemia hypothyroid state: Resume prior to admission medicines  Continue inpatient care    5.  Chronic hyponatremia/likely has pseudohyponatremia component with uncontrolled hyperglycemia          Diet: NPO per Anesthesia Guidelines for Procedure/Surgery Except for: Meds    DVT Prophylaxis:  "Pneumatic Compression Devices and Ambulate every shift  [place] Catheter: Not present  Lines: None     Cardiac Monitoring: None  Code Status: Full Code      Clinically Significant Risk Factors Present on Admission                # Drug Induced Platelet Defect: home medication list includes an antiplatelet medication   # Hypertension: Noted on problem list     # DMII: A1C = 8.0 % (Ref range: 0.0 - 5.6 %) within past 6 months    # Severe Obesity: Estimated body mass index is 41.6 kg/m  as calculated from the following:    Height as of this encounter: 1.651 m (5' 5\").    Weight as of this encounter: 113.4 kg (250 lb).              Disposition Plan     Medically Ready for Discharge: Anticipated in 2-4 Days             Maxwell Cantrell MD, MD  Hospitalist Service  [facility]  Securely message with [employer] (more info)  Text page via [employer] Paging/Directory   ______________________________________________________________________    Interval History   I assumed medicine service care  Chart reviewed seen and examined.  Case discussed with nursing service was gracious to be present at bedside during my encounter  I met this gentleman this morning while he is laying comfortably in bed.  No reported issues of uncontrolled pain overnight.  No mental status changes.  Afebrile.  Not requiring oxygen support.  Remain n.p.o. while awaiting for surgical evaluation  Voiding freely  No mental status changes reported overnight    Physical Exam   Vital Signs: Temp: 97.3  F (36.3  C) Temp src: Temporal BP: (!) 142/51 Pulse: 93   Resp: 20 SpO2: 97 % O2 Device: None (Room air)    Weight: 250 lbs 0 oz    HEENT; Atraumatic, normocephalic, pinkish conjuctiva, pupils bilateral reactive   Skin: warm and moist, no rashes  Lungs: equal chest expansion, clear to auscultation, no wheezes, no stridor, no crackles,   Heart: normal rate, normal rhythm, no rubs or gallops.   Abdomen: normal bowel sounds, no tenderness, no " peritoneal signs, no guarding  Extremities: no deformities, minimal swelling on right knee area, no significant tenderness  Neuro; follow commands, alert and oriented x3, spontaneous speech, coherent, moves all extremities spontaneously  Psych; no hallucination, euthymic mood, not agitated      Medical Decision Making       50 MINUTES SPENT BY ME on the date of service doing chart review, history, exam, documentation & further activities per the note.  MANAGEMENT DISCUSSED with the following over the past 24 hours: Yes   NOTE(S)/MEDICAL RECORDS REVIEWED over the past 24 hours: Yes       Data         Imaging results reviewed over the past 24 hrs:   No results found for this or any previous visit (from the past 24 hour(s)).

## 2024-05-20 ENCOUNTER — ANESTHESIA EVENT (OUTPATIENT)
Dept: SURGERY | Facility: CLINIC | Age: 64
DRG: 501 | End: 2024-05-20
Payer: MEDICARE

## 2024-05-20 ENCOUNTER — ANESTHESIA (OUTPATIENT)
Dept: SURGERY | Facility: CLINIC | Age: 64
DRG: 501 | End: 2024-05-20
Payer: MEDICARE

## 2024-05-20 ENCOUNTER — APPOINTMENT (OUTPATIENT)
Dept: PHYSICAL THERAPY | Facility: CLINIC | Age: 64
DRG: 501 | End: 2024-05-20
Payer: MEDICARE

## 2024-05-20 LAB
ANION GAP SERPL CALCULATED.3IONS-SCNC: 12 MMOL/L (ref 7–15)
BACTERIA ASPIRATE CULT: ABNORMAL
BACTERIA TISS BX CULT: ABNORMAL
BASOPHILS # BLD AUTO: 0 10E3/UL (ref 0–0.2)
BASOPHILS NFR BLD AUTO: 0 %
BUN SERPL-MCNC: 21 MG/DL (ref 8–23)
CALCIUM SERPL-MCNC: 8.8 MG/DL (ref 8.8–10.2)
CHLORIDE SERPL-SCNC: 94 MMOL/L (ref 98–107)
CREAT SERPL-MCNC: 1.62 MG/DL (ref 0.67–1.17)
DEPRECATED HCO3 PLAS-SCNC: 22 MMOL/L (ref 22–29)
EGFRCR SERPLBLD CKD-EPI 2021: 47 ML/MIN/1.73M2
EOSINOPHIL # BLD AUTO: 0.2 10E3/UL (ref 0–0.7)
EOSINOPHIL NFR BLD AUTO: 1 %
ERYTHROCYTE [DISTWIDTH] IN BLOOD BY AUTOMATED COUNT: 13.2 % (ref 10–15)
GLUCOSE BLDC GLUCOMTR-MCNC: 176 MG/DL (ref 70–99)
GLUCOSE BLDC GLUCOMTR-MCNC: 186 MG/DL (ref 70–99)
GLUCOSE BLDC GLUCOMTR-MCNC: 191 MG/DL (ref 70–99)
GLUCOSE BLDC GLUCOMTR-MCNC: 191 MG/DL (ref 70–99)
GLUCOSE BLDC GLUCOMTR-MCNC: 295 MG/DL (ref 70–99)
GLUCOSE BLDC GLUCOMTR-MCNC: 405 MG/DL (ref 70–99)
GLUCOSE SERPL-MCNC: 190 MG/DL (ref 70–99)
GRAM STAIN RESULT: ABNORMAL
HCT VFR BLD AUTO: 27.5 % (ref 40–53)
HGB BLD-MCNC: 9.4 G/DL (ref 13.3–17.7)
IMM GRANULOCYTES # BLD: 0.2 10E3/UL
IMM GRANULOCYTES NFR BLD: 1 %
LYMPHOCYTES # BLD AUTO: 1 10E3/UL (ref 0.8–5.3)
LYMPHOCYTES NFR BLD AUTO: 8 %
MCH RBC QN AUTO: 29.9 PG (ref 26.5–33)
MCHC RBC AUTO-ENTMCNC: 34.2 G/DL (ref 31.5–36.5)
MCV RBC AUTO: 88 FL (ref 78–100)
MONOCYTES # BLD AUTO: 1.1 10E3/UL (ref 0–1.3)
MONOCYTES NFR BLD AUTO: 8 %
NEUTROPHILS # BLD AUTO: 10.9 10E3/UL (ref 1.6–8.3)
NEUTROPHILS NFR BLD AUTO: 82 %
NRBC # BLD AUTO: 0 10E3/UL
NRBC BLD AUTO-RTO: 0 /100
PLATELET # BLD AUTO: 274 10E3/UL (ref 150–450)
POTASSIUM SERPL-SCNC: 4.4 MMOL/L (ref 3.4–5.3)
RBC # BLD AUTO: 3.14 10E6/UL (ref 4.4–5.9)
SODIUM SERPL-SCNC: 128 MMOL/L (ref 135–145)
VANCOMYCIN SERPL-MCNC: 13.3 UG/ML
WBC # BLD AUTO: 13.3 10E3/UL (ref 4–11)

## 2024-05-20 PROCEDURE — 80202 ASSAY OF VANCOMYCIN: CPT | Performed by: INTERNAL MEDICINE

## 2024-05-20 PROCEDURE — 258N000003 HC RX IP 258 OP 636

## 2024-05-20 PROCEDURE — 80048 BASIC METABOLIC PNL TOTAL CA: CPT | Performed by: INTERNAL MEDICINE

## 2024-05-20 PROCEDURE — 250N000011 HC RX IP 250 OP 636

## 2024-05-20 PROCEDURE — 250N000013 HC RX MED GY IP 250 OP 250 PS 637: Performed by: INTERNAL MEDICINE

## 2024-05-20 PROCEDURE — 250N000009 HC RX 250

## 2024-05-20 PROCEDURE — 85025 COMPLETE CBC W/AUTO DIFF WBC: CPT | Performed by: INTERNAL MEDICINE

## 2024-05-20 PROCEDURE — 258N000003 HC RX IP 258 OP 636: Performed by: INTERNAL MEDICINE

## 2024-05-20 PROCEDURE — 36415 COLL VENOUS BLD VENIPUNCTURE: CPT | Performed by: INTERNAL MEDICINE

## 2024-05-20 PROCEDURE — 87205 SMEAR GRAM STAIN: CPT | Performed by: ORTHOPAEDIC SURGERY

## 2024-05-20 PROCEDURE — 258N000003 HC RX IP 258 OP 636: Performed by: ANESTHESIOLOGY

## 2024-05-20 PROCEDURE — 120N000001 HC R&B MED SURG/OB

## 2024-05-20 PROCEDURE — 250N000025 HC SEVOFLURANE, PER MIN: Performed by: ORTHOPAEDIC SURGERY

## 2024-05-20 PROCEDURE — 250N000011 HC RX IP 250 OP 636: Performed by: ORTHOPAEDIC SURGERY

## 2024-05-20 PROCEDURE — 87070 CULTURE OTHR SPECIMN AEROBIC: CPT | Performed by: ORTHOPAEDIC SURGERY

## 2024-05-20 PROCEDURE — 370N000017 HC ANESTHESIA TECHNICAL FEE, PER MIN: Performed by: ORTHOPAEDIC SURGERY

## 2024-05-20 PROCEDURE — 999N000141 HC STATISTIC PRE-PROCEDURE NURSING ASSESSMENT: Performed by: ORTHOPAEDIC SURGERY

## 2024-05-20 PROCEDURE — 710N000010 HC RECOVERY PHASE 1, LEVEL 2, PER MIN: Performed by: ORTHOPAEDIC SURGERY

## 2024-05-20 PROCEDURE — 360N000076 HC SURGERY LEVEL 3, PER MIN: Performed by: ORTHOPAEDIC SURGERY

## 2024-05-20 PROCEDURE — 250N000013 HC RX MED GY IP 250 OP 250 PS 637

## 2024-05-20 PROCEDURE — 0MBN0ZZ EXCISION OF RIGHT KNEE BURSA AND LIGAMENT, OPEN APPROACH: ICD-10-PCS | Performed by: ORTHOPAEDIC SURGERY

## 2024-05-20 PROCEDURE — 99222 1ST HOSP IP/OBS MODERATE 55: CPT | Performed by: SPECIALIST

## 2024-05-20 PROCEDURE — 87075 CULTR BACTERIA EXCEPT BLOOD: CPT | Performed by: ORTHOPAEDIC SURGERY

## 2024-05-20 PROCEDURE — 258N000001 HC RX 258: Performed by: ORTHOPAEDIC SURGERY

## 2024-05-20 PROCEDURE — 99233 SBSQ HOSP IP/OBS HIGH 50: CPT | Performed by: INTERNAL MEDICINE

## 2024-05-20 PROCEDURE — 97116 GAIT TRAINING THERAPY: CPT | Mod: GP | Performed by: PHYSICAL THERAPIST

## 2024-05-20 PROCEDURE — 250N000013 HC RX MED GY IP 250 OP 250 PS 637: Performed by: ORTHOPAEDIC SURGERY

## 2024-05-20 PROCEDURE — 97530 THERAPEUTIC ACTIVITIES: CPT | Mod: GP | Performed by: PHYSICAL THERAPIST

## 2024-05-20 PROCEDURE — 272N000001 HC OR GENERAL SUPPLY STERILE: Performed by: ORTHOPAEDIC SURGERY

## 2024-05-20 PROCEDURE — 250N000011 HC RX IP 250 OP 636: Performed by: INTERNAL MEDICINE

## 2024-05-20 RX ORDER — FAMOTIDINE 20 MG/1
20 TABLET, FILM COATED ORAL
Status: DISCONTINUED | OUTPATIENT
Start: 2024-05-20 | End: 2024-05-22 | Stop reason: HOSPADM

## 2024-05-20 RX ORDER — ONDANSETRON 2 MG/ML
4 INJECTION INTRAMUSCULAR; INTRAVENOUS EVERY 30 MIN PRN
Status: CANCELLED | OUTPATIENT
Start: 2024-05-20

## 2024-05-20 RX ORDER — OXYCODONE HYDROCHLORIDE 10 MG/1
10 TABLET ORAL
Status: CANCELLED | OUTPATIENT
Start: 2024-05-20

## 2024-05-20 RX ORDER — PROPOFOL 10 MG/ML
INJECTION, EMULSION INTRAVENOUS PRN
Status: DISCONTINUED | OUTPATIENT
Start: 2024-05-20 | End: 2024-05-20

## 2024-05-20 RX ORDER — DEXAMETHASONE SODIUM PHOSPHATE 4 MG/ML
INJECTION, SOLUTION INTRA-ARTICULAR; INTRALESIONAL; INTRAMUSCULAR; INTRAVENOUS; SOFT TISSUE PRN
Status: DISCONTINUED | OUTPATIENT
Start: 2024-05-20 | End: 2024-05-20

## 2024-05-20 RX ORDER — TRANEXAMIC ACID 650 MG/1
1950 TABLET ORAL ONCE
Status: COMPLETED | OUTPATIENT
Start: 2024-05-20 | End: 2024-05-20

## 2024-05-20 RX ORDER — ONDANSETRON 2 MG/ML
4 INJECTION INTRAMUSCULAR; INTRAVENOUS EVERY 6 HOURS PRN
Status: DISCONTINUED | OUTPATIENT
Start: 2024-05-20 | End: 2024-05-20

## 2024-05-20 RX ORDER — CEFAZOLIN SODIUM 2 G/100ML
2 INJECTION, SOLUTION INTRAVENOUS EVERY 8 HOURS
Qty: 200 ML | Refills: 0 | Status: COMPLETED | OUTPATIENT
Start: 2024-05-20 | End: 2024-05-21

## 2024-05-20 RX ORDER — HYDROXYZINE HYDROCHLORIDE 10 MG/1
10 TABLET, FILM COATED ORAL EVERY 6 HOURS PRN
Status: DISCONTINUED | OUTPATIENT
Start: 2024-05-20 | End: 2024-05-22 | Stop reason: HOSPADM

## 2024-05-20 RX ORDER — DEXAMETHASONE SODIUM PHOSPHATE 4 MG/ML
4 INJECTION, SOLUTION INTRA-ARTICULAR; INTRALESIONAL; INTRAMUSCULAR; INTRAVENOUS; SOFT TISSUE
Status: CANCELLED | OUTPATIENT
Start: 2024-05-20

## 2024-05-20 RX ORDER — LIDOCAINE 40 MG/G
CREAM TOPICAL
Status: DISCONTINUED | OUTPATIENT
Start: 2024-05-20 | End: 2024-05-20 | Stop reason: HOSPADM

## 2024-05-20 RX ORDER — NALOXONE HYDROCHLORIDE 0.4 MG/ML
0.1 INJECTION, SOLUTION INTRAMUSCULAR; INTRAVENOUS; SUBCUTANEOUS
Status: CANCELLED | OUTPATIENT
Start: 2024-05-20

## 2024-05-20 RX ORDER — HYDROMORPHONE HCL IN WATER/PF 6 MG/30 ML
0.2 PATIENT CONTROLLED ANALGESIA SYRINGE INTRAVENOUS
Status: DISCONTINUED | OUTPATIENT
Start: 2024-05-20 | End: 2024-05-22 | Stop reason: HOSPADM

## 2024-05-20 RX ORDER — OXYCODONE HYDROCHLORIDE 10 MG/1
10 TABLET ORAL EVERY 4 HOURS PRN
Status: DISCONTINUED | OUTPATIENT
Start: 2024-05-20 | End: 2024-05-22 | Stop reason: HOSPADM

## 2024-05-20 RX ORDER — ACETAMINOPHEN 325 MG/1
650 TABLET ORAL EVERY 4 HOURS PRN
Status: DISCONTINUED | OUTPATIENT
Start: 2024-05-23 | End: 2024-05-22 | Stop reason: HOSPADM

## 2024-05-20 RX ORDER — OXYCODONE HYDROCHLORIDE 5 MG/1
5 TABLET ORAL EVERY 4 HOURS PRN
Status: DISCONTINUED | OUTPATIENT
Start: 2024-05-20 | End: 2024-05-22 | Stop reason: HOSPADM

## 2024-05-20 RX ORDER — GLYCOPYRROLATE 0.2 MG/ML
INJECTION, SOLUTION INTRAMUSCULAR; INTRAVENOUS PRN
Status: DISCONTINUED | OUTPATIENT
Start: 2024-05-20 | End: 2024-05-20

## 2024-05-20 RX ORDER — DEXAMETHASONE SODIUM PHOSPHATE 4 MG/ML
4 INJECTION, SOLUTION INTRA-ARTICULAR; INTRALESIONAL; INTRAMUSCULAR; INTRAVENOUS; SOFT TISSUE
Status: DISCONTINUED | OUTPATIENT
Start: 2024-05-20 | End: 2024-05-20 | Stop reason: HOSPADM

## 2024-05-20 RX ORDER — ACETAMINOPHEN 325 MG/1
975 TABLET ORAL ONCE
Status: DISCONTINUED | OUTPATIENT
Start: 2024-05-20 | End: 2024-05-20 | Stop reason: HOSPADM

## 2024-05-20 RX ORDER — LIDOCAINE 40 MG/G
CREAM TOPICAL
Status: DISCONTINUED | OUTPATIENT
Start: 2024-05-20 | End: 2024-05-22 | Stop reason: HOSPADM

## 2024-05-20 RX ORDER — ONDANSETRON 4 MG/1
4 TABLET, ORALLY DISINTEGRATING ORAL EVERY 30 MIN PRN
Status: DISCONTINUED | OUTPATIENT
Start: 2024-05-20 | End: 2024-05-20 | Stop reason: HOSPADM

## 2024-05-20 RX ORDER — SODIUM CHLORIDE, SODIUM LACTATE, POTASSIUM CHLORIDE, CALCIUM CHLORIDE 600; 310; 30; 20 MG/100ML; MG/100ML; MG/100ML; MG/100ML
INJECTION, SOLUTION INTRAVENOUS CONTINUOUS
Status: DISCONTINUED | OUTPATIENT
Start: 2024-05-20 | End: 2024-05-20 | Stop reason: HOSPADM

## 2024-05-20 RX ORDER — FENTANYL CITRATE 50 UG/ML
50 INJECTION, SOLUTION INTRAMUSCULAR; INTRAVENOUS EVERY 5 MIN PRN
Status: DISCONTINUED | OUTPATIENT
Start: 2024-05-20 | End: 2024-05-20 | Stop reason: HOSPADM

## 2024-05-20 RX ORDER — ONDANSETRON 2 MG/ML
INJECTION INTRAMUSCULAR; INTRAVENOUS PRN
Status: DISCONTINUED | OUTPATIENT
Start: 2024-05-20 | End: 2024-05-20

## 2024-05-20 RX ORDER — ONDANSETRON 4 MG/1
4 TABLET, ORALLY DISINTEGRATING ORAL EVERY 6 HOURS PRN
Status: DISCONTINUED | OUTPATIENT
Start: 2024-05-20 | End: 2024-05-20

## 2024-05-20 RX ORDER — ONDANSETRON 2 MG/ML
4 INJECTION INTRAMUSCULAR; INTRAVENOUS EVERY 30 MIN PRN
Status: DISCONTINUED | OUTPATIENT
Start: 2024-05-20 | End: 2024-05-20 | Stop reason: HOSPADM

## 2024-05-20 RX ORDER — HYDROMORPHONE HCL IN WATER/PF 6 MG/30 ML
0.2 PATIENT CONTROLLED ANALGESIA SYRINGE INTRAVENOUS EVERY 5 MIN PRN
Status: DISCONTINUED | OUTPATIENT
Start: 2024-05-20 | End: 2024-05-20 | Stop reason: HOSPADM

## 2024-05-20 RX ORDER — ASPIRIN 81 MG/1
81 TABLET ORAL 2 TIMES DAILY
Status: DISCONTINUED | OUTPATIENT
Start: 2024-05-20 | End: 2024-05-22 | Stop reason: HOSPADM

## 2024-05-20 RX ORDER — OXYCODONE HYDROCHLORIDE 5 MG/1
5 TABLET ORAL
Status: CANCELLED | OUTPATIENT
Start: 2024-05-20

## 2024-05-20 RX ORDER — SODIUM CHLORIDE, SODIUM LACTATE, POTASSIUM CHLORIDE, CALCIUM CHLORIDE 600; 310; 30; 20 MG/100ML; MG/100ML; MG/100ML; MG/100ML
INJECTION, SOLUTION INTRAVENOUS CONTINUOUS
Status: DISCONTINUED | OUTPATIENT
Start: 2024-05-20 | End: 2024-05-22

## 2024-05-20 RX ORDER — CEFAZOLIN SODIUM/WATER 2 G/20 ML
2 SYRINGE (ML) INTRAVENOUS SEE ADMIN INSTRUCTIONS
Status: DISCONTINUED | OUTPATIENT
Start: 2024-05-20 | End: 2024-05-20 | Stop reason: HOSPADM

## 2024-05-20 RX ORDER — LABETALOL HYDROCHLORIDE 5 MG/ML
10 INJECTION, SOLUTION INTRAVENOUS EVERY 5 MIN PRN
Status: DISCONTINUED | OUTPATIENT
Start: 2024-05-20 | End: 2024-05-20 | Stop reason: HOSPADM

## 2024-05-20 RX ORDER — CEFAZOLIN SODIUM/WATER 2 G/20 ML
2 SYRINGE (ML) INTRAVENOUS
Status: COMPLETED | OUTPATIENT
Start: 2024-05-20 | End: 2024-05-20

## 2024-05-20 RX ORDER — NALOXONE HYDROCHLORIDE 0.4 MG/ML
0.1 INJECTION, SOLUTION INTRAMUSCULAR; INTRAVENOUS; SUBCUTANEOUS
Status: DISCONTINUED | OUTPATIENT
Start: 2024-05-20 | End: 2024-05-20 | Stop reason: HOSPADM

## 2024-05-20 RX ORDER — ONDANSETRON 4 MG/1
4 TABLET, ORALLY DISINTEGRATING ORAL EVERY 30 MIN PRN
Status: CANCELLED | OUTPATIENT
Start: 2024-05-20

## 2024-05-20 RX ORDER — FENTANYL CITRATE 50 UG/ML
25 INJECTION, SOLUTION INTRAMUSCULAR; INTRAVENOUS EVERY 5 MIN PRN
Status: DISCONTINUED | OUTPATIENT
Start: 2024-05-20 | End: 2024-05-20 | Stop reason: HOSPADM

## 2024-05-20 RX ORDER — FENTANYL CITRATE 50 UG/ML
INJECTION, SOLUTION INTRAMUSCULAR; INTRAVENOUS PRN
Status: DISCONTINUED | OUTPATIENT
Start: 2024-05-20 | End: 2024-05-20

## 2024-05-20 RX ORDER — HYDROMORPHONE HCL IN WATER/PF 6 MG/30 ML
0.4 PATIENT CONTROLLED ANALGESIA SYRINGE INTRAVENOUS EVERY 5 MIN PRN
Status: DISCONTINUED | OUTPATIENT
Start: 2024-05-20 | End: 2024-05-20 | Stop reason: HOSPADM

## 2024-05-20 RX ORDER — ACETAMINOPHEN 325 MG/1
975 TABLET ORAL EVERY 8 HOURS
Qty: 27 TABLET | Refills: 0 | Status: DISCONTINUED | OUTPATIENT
Start: 2024-05-20 | End: 2024-05-22 | Stop reason: HOSPADM

## 2024-05-20 RX ORDER — POLYETHYLENE GLYCOL 3350 17 G/17G
17 POWDER, FOR SOLUTION ORAL DAILY
Status: DISCONTINUED | OUTPATIENT
Start: 2024-05-21 | End: 2024-05-22 | Stop reason: HOSPADM

## 2024-05-20 RX ORDER — HYDROMORPHONE HCL IN WATER/PF 6 MG/30 ML
0.4 PATIENT CONTROLLED ANALGESIA SYRINGE INTRAVENOUS
Status: DISCONTINUED | OUTPATIENT
Start: 2024-05-20 | End: 2024-05-22 | Stop reason: HOSPADM

## 2024-05-20 RX ORDER — AMOXICILLIN 250 MG
1 CAPSULE ORAL 2 TIMES DAILY
Status: DISCONTINUED | OUTPATIENT
Start: 2024-05-20 | End: 2024-05-22 | Stop reason: HOSPADM

## 2024-05-20 RX ORDER — LIDOCAINE HYDROCHLORIDE 20 MG/ML
INJECTION, SOLUTION INFILTRATION; PERINEURAL PRN
Status: DISCONTINUED | OUTPATIENT
Start: 2024-05-20 | End: 2024-05-20

## 2024-05-20 RX ORDER — BISACODYL 10 MG
10 SUPPOSITORY, RECTAL RECTAL DAILY PRN
Status: DISCONTINUED | OUTPATIENT
Start: 2024-05-20 | End: 2024-05-22 | Stop reason: HOSPADM

## 2024-05-20 RX ORDER — PROCHLORPERAZINE MALEATE 5 MG
5 TABLET ORAL EVERY 6 HOURS PRN
Status: DISCONTINUED | OUTPATIENT
Start: 2024-05-20 | End: 2024-05-22 | Stop reason: HOSPADM

## 2024-05-20 RX ADMIN — CEFAZOLIN SODIUM 2 G: 2 INJECTION, SOLUTION INTRAVENOUS at 18:32

## 2024-05-20 RX ADMIN — SODIUM CHLORIDE, POTASSIUM CHLORIDE, SODIUM LACTATE AND CALCIUM CHLORIDE: 600; 310; 30; 20 INJECTION, SOLUTION INTRAVENOUS at 09:31

## 2024-05-20 RX ADMIN — ACETAMINOPHEN 975 MG: 325 TABLET, FILM COATED ORAL at 22:16

## 2024-05-20 RX ADMIN — FAMOTIDINE 20 MG: 20 TABLET ORAL at 20:07

## 2024-05-20 RX ADMIN — ACETAMINOPHEN 975 MG: 325 TABLET, FILM COATED ORAL at 14:10

## 2024-05-20 RX ADMIN — SODIUM CHLORIDE, POTASSIUM CHLORIDE, SODIUM LACTATE AND CALCIUM CHLORIDE: 600; 310; 30; 20 INJECTION, SOLUTION INTRAVENOUS at 09:53

## 2024-05-20 RX ADMIN — ASPIRIN 81 MG: 81 TABLET, COATED ORAL at 20:07

## 2024-05-20 RX ADMIN — ONDANSETRON 4 MG: 2 INJECTION INTRAMUSCULAR; INTRAVENOUS at 10:09

## 2024-05-20 RX ADMIN — VANCOMYCIN HYDROCHLORIDE 1250 MG: 10 INJECTION, POWDER, LYOPHILIZED, FOR SOLUTION INTRAVENOUS at 15:35

## 2024-05-20 RX ADMIN — DEXAMETHASONE SODIUM PHOSPHATE 4 MG: 4 INJECTION, SOLUTION INTRA-ARTICULAR; INTRALESIONAL; INTRAMUSCULAR; INTRAVENOUS; SOFT TISSUE at 10:06

## 2024-05-20 RX ADMIN — LOSARTAN POTASSIUM 100 MG: 100 TABLET, FILM COATED ORAL at 13:04

## 2024-05-20 RX ADMIN — FENTANYL CITRATE 50 MCG: 50 INJECTION INTRAMUSCULAR; INTRAVENOUS at 10:30

## 2024-05-20 RX ADMIN — MIRTAZAPINE 15 MG: 15 TABLET, FILM COATED ORAL at 22:21

## 2024-05-20 RX ADMIN — SIMVASTATIN 20 MG: 10 TABLET, FILM COATED ORAL at 13:03

## 2024-05-20 RX ADMIN — OXYCODONE HYDROCHLORIDE 5 MG: 5 TABLET ORAL at 13:03

## 2024-05-20 RX ADMIN — SODIUM CHLORIDE, POTASSIUM CHLORIDE, SODIUM LACTATE AND CALCIUM CHLORIDE: 600; 310; 30; 20 INJECTION, SOLUTION INTRAVENOUS at 13:05

## 2024-05-20 RX ADMIN — FENTANYL CITRATE 50 MCG: 50 INJECTION INTRAMUSCULAR; INTRAVENOUS at 10:19

## 2024-05-20 RX ADMIN — ATENOLOL 25 MG: 25 TABLET ORAL at 13:03

## 2024-05-20 RX ADMIN — MIRTAZAPINE 45 MG: 15 TABLET, FILM COATED ORAL at 22:23

## 2024-05-20 RX ADMIN — LATANOPROST 1 DROP: 50 SOLUTION/ DROPS OPHTHALMIC at 23:16

## 2024-05-20 RX ADMIN — TRANEXAMIC ACID 1950 MG: 650 TABLET ORAL at 09:24

## 2024-05-20 RX ADMIN — PROPOFOL 200 MG: 10 INJECTION, EMULSION INTRAVENOUS at 10:04

## 2024-05-20 RX ADMIN — FENTANYL CITRATE 100 MCG: 50 INJECTION INTRAMUSCULAR; INTRAVENOUS at 10:04

## 2024-05-20 RX ADMIN — LIDOCAINE HYDROCHLORIDE 50 MG: 20 INJECTION, SOLUTION INFILTRATION; PERINEURAL at 10:04

## 2024-05-20 RX ADMIN — GLYCOPYRROLATE 0.2 MG: 0.2 INJECTION, SOLUTION INTRAMUSCULAR; INTRAVENOUS at 10:09

## 2024-05-20 RX ADMIN — Medication 2 G: at 09:53

## 2024-05-20 RX ADMIN — FINASTERIDE 5 MG: 5 TABLET, FILM COATED ORAL at 20:07

## 2024-05-20 RX ADMIN — SENNOSIDES AND DOCUSATE SODIUM 1 TABLET: 50; 8.6 TABLET ORAL at 20:07

## 2024-05-20 RX ADMIN — SENNOSIDES AND DOCUSATE SODIUM 2 TABLET: 50; 8.6 TABLET ORAL at 23:17

## 2024-05-20 RX ADMIN — OXYCODONE HYDROCHLORIDE 5 MG: 5 TABLET ORAL at 22:17

## 2024-05-20 RX ADMIN — BUPROPION HYDROCHLORIDE 150 MG: 150 TABLET, EXTENDED RELEASE ORAL at 13:04

## 2024-05-20 ASSESSMENT — ACTIVITIES OF DAILY LIVING (ADL)
ADLS_ACUITY_SCORE: 33
ADLS_ACUITY_SCORE: 31
ADLS_ACUITY_SCORE: 32
ADLS_ACUITY_SCORE: 32
ADLS_ACUITY_SCORE: 31
ADLS_ACUITY_SCORE: 33
ADLS_ACUITY_SCORE: 31
ADLS_ACUITY_SCORE: 32
ADLS_ACUITY_SCORE: 31
ADLS_ACUITY_SCORE: 31
ADLS_ACUITY_SCORE: 32
ADLS_ACUITY_SCORE: 31
ADLS_ACUITY_SCORE: 32
ADLS_ACUITY_SCORE: 31
ADLS_ACUITY_SCORE: 31
ADLS_ACUITY_SCORE: 32
ADLS_ACUITY_SCORE: 31
ADLS_ACUITY_SCORE: 32

## 2024-05-20 NOTE — ANESTHESIA PROCEDURE NOTES
Airway       Patient location during procedure: OR  Staff -        CRNA: Haylie Hall APRN CRNA       Performed By: CRNA  Consent for Airway        Urgency: elective  Indications and Patient Condition       Indications for airway management: qasim-procedural       Induction type:intravenous       Mask difficulty assessment: 0 - not attempted    Final Airway Details       Final airway type: supraglottic airway    Supraglottic Airway Details        Type: LMA       Brand: I-Gel       LMA size: 5    Post intubation assessment        Placement verified by: capnometry, equal breath sounds and chest rise        Number of attempts at approach: 1       Number of other approaches attempted: 0       Secured with: commercial tube bishop       Ease of procedure: easy       Dentition: Unchanged

## 2024-05-20 NOTE — PHARMACY-VANCOMYCIN DOSING SERVICE
Pharmacy Vancomycin Note  Date of Service May 20, 2024  Patient's  1960   64 year old, male    Indication: Bone and Joint Infection  Day of Therapy: 4  Current vancomycin regimen:  1000 mg IV q24h  Current vancomycin monitoring method: AUC  Current vancomycin therapeutic monitoring goal: 400-600 mg*h/L    [employer] Prediction of Current Vancomycin Regimen    Regimen: 1000 mg IV every 24 hours.  Start time: 10:53 on 2024  Exposure target: AUC24 (range)400-600 mg/L.hr   AUC24,ss: 404 mg/L.hr  Probability of AUC24 > 400: 52 %  Ctrough,ss: 9.4 mg/L  Probability of Ctrough,ss > 20: 0 %  Probability of nephrotoxicity (Lodise CLAUDIO ): 5 %      Current estimated CrCl = Estimated Creatinine Clearance: 52.2 mL/min (A) (based on SCr of 1.62 mg/dL (H)).    Creatinine for last 3 days  2024:  6:14 PM Creatinine 1.74 mg/dL  2024:  5:45 AM Creatinine 1.80 mg/dL  2024:  8:30 AM Creatinine 1.75 mg/dL  2024:  7:20 AM Creatinine 1.62 mg/dL    Recent Vancomycin Levels (past 3 days)  2024:  7:20 AM Vancomycin 13.3 ug/mL    Vancomycin IV Administrations (past 72 hours)                     vancomycin (VANCOCIN) 1,000 mg in 200 mL dextrose intermittent infusion (mg) 1,000 mg New Bag 24 1053     1,000 mg New Bag 24 1122    vancomycin (VANCOCIN) 1,750 mg in 0.9% NaCl 500 mL intermittent infusion (mg) 1,750 mg Given 24 2254                    Nephrotoxins and other renal medications (From now, onward)      Start     Dose/Rate Route Frequency Ordered Stop    24 1000  vancomycin (VANCOCIN) 1,250 mg in 0.9% NaCl 250 mL intermittent infusion         1,250 mg  over 90 Minutes Intravenous EVERY 24 HOURS 24 0839                 Contrast Orders - past 72 hours (72h ago, onward)      None            Interpretation of levels and current regimen:  Vancomycin level is reflective of -600    Has serum creatinine changed greater than 50% in last 72 hours: No    Urine output:   unable to determine    Renal Function: Improving    [employer] Prediction of Planned New Vancomycin Regimen    Regimen: 1250 mg IV every 24 hours.  Start time: 10:00 on 05/20/2024  Exposure target: AUC24 (range)400-600 mg/L.hr   AUC24,ss: 503 mg/L.hr  Probability of AUC24 > 400: 88 %  Ctrough,ss: 11.8 mg/L  Probability of Ctrough,ss > 20: 0 %  Probability of nephrotoxicity (Lodise CLAUDIO 2009): 7 %      Plan:  Increase Dose to 1250 mg q24  Vancomycin monitoring method: AUC  Vancomycin therapeutic monitoring goal: 400-600 mg*h/L  Pharmacy will check vancomycin levels as appropriate in 1-3 Days.  Serum creatinine levels will be ordered daily for the first week of therapy and at least twice weekly for subsequent weeks.    Bela Yanez, [facility]

## 2024-05-20 NOTE — ANESTHESIA POSTPROCEDURE EVALUATION
Patient: Steve Morgan    Procedure: Procedure(s):  INCISION AND DRAINAGE, KNEE       Anesthesia Type:  General    Note:  Disposition: Inpatient   Postop Pain Control: Uneventful            Sign Out: Well controlled pain   PONV: No   Neuro/Psych: Uneventful            Sign Out: Acceptable/Baseline neuro status   Airway/Respiratory: Uneventful            Sign Out: Acceptable/Baseline resp. status   CV/Hemodynamics: Uneventful            Sign Out: Acceptable CV status; No obvious hypovolemia; No obvious fluid overload   Other NRE: NONE   DID A NON-ROUTINE EVENT OCCUR? No           Last vitals:  Vitals Value Taken Time   /78 05/20/24 1115   Temp 96.9  F (36.1  C) 05/20/24 1110   Pulse 81 05/20/24 1118   Resp 16 05/20/24 1118   SpO2 97 % 05/20/24 1118   Vitals shown include unfiled device data.    Electronically Signed By: Rufina Franco MD  May 20, 2024  11:19 AM

## 2024-05-20 NOTE — PROGRESS NOTES
Patient states he hasn't had a BM since 5/15/24.     Patient also states that kidney doctor wants 50 mL at least a day.    Tenisha Tran RN on 5/20/2024 at 9:28 AM

## 2024-05-20 NOTE — PROGRESS NOTES
[facility]    Medicine Progress Note - Hospitalist Service    Date of Admission:  5/17/2024    Assessment & Plan   64M with hx of IDDM Type II, CKD Stage III, morbid obesity BMI 41, and hypertension presents with right knee pain and swelling. On adm, leukocytosis but no other signs of sepsis. XR of right knee with evidence of prepatellar swelling but no joint effusion.  Patellar aspiration performed with 5 mL cloudy fluid removed and sent for analysis   Aspirate is not demonstrating gram-positive cocci and patient has a history of MRSA.    1/.  Prepatellar septic bursitis with isolated Staph aureus  -Prior history of MRSA infection    -Remain on IV antibiotics currently on vancomycin  -Currently afebrile  -Blood cultures remain no growth up-to-date  -No significant worsening uncontrolled pain  -Appreciate continuous input from orthopedic service.  Plans for I&D this morning  -Requested formal ID service input   -Monitor infectious markers    2/.  Type 2 diabetes mellitus probably ongoing for 15 to 20 years with multiple endorgan issues including peripheral neuropathy and CKD.  Resume several of the home medications continue with Accu-Cheks and sliding scale insulin.  -Modified basal Lantus dosing today given current n.p.o. status with planned surgery.  He shared with me that he usually takes 40 units once a day at nighttime of Lantus  -Administer 25 units Lantus today  -Resume home regimen of Lantus starting tomorrow 40 units  -Hold his metformin given n.p.o. status  -Creatinine remained stable at 1.6    3/.  Peripheral neuropathy significant in nature, falls precautions to be instituted discussed with patient and bedside nursing stating that he does need to use his walker and ask for help when he is trying to move around.    4/.  Other medical issues including dyslipidemia hypothyroid state: Resume prior to admission medicines  Continue inpatient care    5.  Chronic hyponatremia/likely has  "pseudohyponatremia component with uncontrolled hyperglycemia  -Stable hyponatremia        Diet: NPO per Anesthesia Guidelines for Procedure/Surgery Except for: Meds    DVT Prophylaxis: Pneumatic Compression Devices and Ambulate every shift  [place] Catheter: Not present  Lines: None     Cardiac Monitoring: None  Code Status: Full Code      Clinically Significant Risk Factors         # Hyponatremia: Lowest Na = 128 mmol/L in last 2 days, will monitor as appropriate          # Hypertension: Noted on problem list       # DMII: A1C = 8.0 % (Ref range: 0.0 - 5.6 %) within past 6 months, PRESENT ON ADMISSION  # Severe Obesity: Estimated body mass index is 41.6 kg/m  as calculated from the following:    Height as of this encounter: 1.651 m (5' 5\").    Weight as of this encounter: 113.4 kg (250 lb)., PRESENT ON ADMISSION            Disposition Plan     Medically Ready for Discharge: Anticipated in 2-4 Days             Maxwell Cantrell MD, MD  Hospitalist Service  [facility]  Securely message with [employer] (more info)  Text page via [employer] Paging/Directory   ______________________________________________________________________    Interval History   Continuing medicine service care  Chart reviewed seen and examined.  Case discussed with nursing service was gracious to be present at bedside during my encounter  Family updated over the phone during my encounter  Orthopedic service have plans for I&D this morning  No reported issues of nausea, vomiting or diarrhea overnight.  Remained afebrile.  Not requiring oxygen support.  No mental status changes.        Physical Exam   Vital Signs: Temp: 97.2  F (36.2  C) Temp src: Temporal BP: (!) 156/74 Pulse: 84   Resp: 20 SpO2: 96 % O2 Device: None (Room air)    Weight: 250 lbs 0 oz    HEENT; Atraumatic, normocephalic, pinkish conjuctiva, pupils bilateral reactive   Skin: warm and moist, no rashes  Lungs: equal chest expansion, clear to auscultation, no wheezes, " no stridor, no crackles,   Heart: normal rate, normal rhythm, no rubs or gallops.   Abdomen: normal bowel sounds, no tenderness, no peritoneal signs, no guarding  Extremities: no deformities, minimal swelling on right knee area, no significant tenderness  Neuro; follow commands, alert and oriented x3, spontaneous speech, coherent, moves all extremities spontaneously  Psych; no hallucination, euthymic mood, not agitated      Medical Decision Making       50 MINUTES SPENT BY ME on the date of service doing chart review, history, exam, documentation & further activities per the note.  MANAGEMENT DISCUSSED with the following over the past 24 hours: Yes   NOTE(S)/MEDICAL RECORDS REVIEWED over the past 24 hours: Yes       Data     I have personally reviewed the following data over the past 24 hrs:    13.3 (H)  \   9.4 (L)   / 274     128 (L) 94 (L) 21.0 /  190 (H)   4.4 22 1.62 (H) \       Imaging results reviewed over the past 24 hrs:   No results found for this or any previous visit (from the past 24 hour(s)).

## 2024-05-20 NOTE — PROGRESS NOTES
Dr. Franco notified regarding elevated blood sugar of 191.  No intervention or recheck needed per provider.

## 2024-05-20 NOTE — PLAN OF CARE
Goal Outcome Evaluation:      Plan of Care Reviewed With: patient    Overall Patient Progress: no changeOverall Patient Progress: no change     Pt A/O x4. VSS. PIV infusing. Pain managed with scheduled tylenol and PRN Oxycodone. Assist x1 withw alker and gait belt. WBAT to RLE. Tolerating diet well. CMS at baseline. Small amount dried drainage on dressing. Hemovac in placed and compressed, 10cc out during shift. Due to void. Pt straight cath at 1650 for 1000cc. Plan TBD.       Problem: Adult Inpatient Plan of Care  Goal: Readiness for Transition of Care  5/20/2024 1853 by Pia Taylor RN  Outcome: Not Progressing  5/20/2024 1847 by Pia Taylor RN  Outcome: Not Progressing     Problem: Skin or Soft Tissue Infection  Goal: Absence of Infection Signs and Symptoms  5/20/2024 1853 by Pia Taylor RN  Outcome: Not Progressing  5/20/2024 1847 by Pia Taylor RN  Outcome: Not Progressing  Intervention: Minimize and Manage Infection Progression  Recent Flowsheet Documentation  Taken 5/20/2024 1525 by Pia Taylor RN  Infection Prevention: rest/sleep promoted  Isolation Precautions: contact precautions maintained  Taken 5/20/2024 0857 by Pia Taylor RN  Infection Prevention: rest/sleep promoted  Isolation Precautions: contact precautions maintained  Intervention: Provide Meticulous Infection Site Care  Recent Flowsheet Documentation  Taken 5/20/2024 0857 by Pia Taylor RN  Topical Inflammation Care: border marked    Problem: Adult Inpatient Plan of Care  Goal: Plan of Care Review  Description: The Plan of Care Review/Shift note should be completed every shift.  The Outcome Evaluation is a brief statement about your assessment that the patient is improving, declining, or no change.  This information will be displayed automatically on your shift  note.  5/20/2024 1853 by Pia Taylor RN  Outcome: Progressing  Flowsheets (Taken 5/20/2024 1849)  Outcome Evaluation: Pt A/O  "x4. VSS. PIV infusing. Pain managed with scheduled tylenol and PRN Oxycodone. Assist x1 withw alker and gait belt. WBAT to RLE. Tolerating diet well. CMS at baseline. Small amount dried drainage on dressing. Hemovac in placed and compressed, 10cc out during shift. Due to void. Pt straight cath at 1650 for 1000cc. Plan TBD.  5/20/2024 1847 by Pia Taylor, RN  Outcome: Progressing  Flowsheets (Taken 5/20/2024 1847)  Plan of Care Reviewed With: patient  Overall Patient Progress: no change  Goal: Patient-Specific Goal (Individualized)  Description: You can add care plan individualizations to a care plan. Examples of Individualization might be:  \"Parent requests to be called daily at 9am for status\", \"I have a hard time hearing out of my right ear\", or \"Do not touch me to wake me up as it startles  me\".  5/20/2024 1853 by Pia Taylor RN  Outcome: Progressing  5/20/2024 1847 by Pia Taylor, RN  Outcome: Progressing  Goal: Absence of Hospital-Acquired Illness or Injury  5/20/2024 1853 by Pia Taylor RN  Outcome: Progressing  5/20/2024 1847 by Pia Taylor, RN  Outcome: Progressing  Intervention: Identify and Manage Fall Risk  Recent Flowsheet Documentation  Taken 5/20/2024 1525 by Pia Taylor RN  Safety Promotion/Fall Prevention:   activity supervised   clutter free environment maintained   safety round/check completed  Taken 5/20/2024 0857 by Pia Taylor RN  Safety Promotion/Fall Prevention:   activity supervised   clutter free environment maintained   lighting adjusted   mobility aid in reach   nonskid shoes/slippers when out of bed   safety round/check completed  Intervention: Prevent Skin Injury  Recent Flowsheet Documentation  Taken 5/20/2024 1525 by Pia Taylor, RN  Body Position: position changed independently  Taken 5/20/2024 0857 by Pia Taylor, RN  Body Position: position changed independently  Device Skin Pressure Protection: absorbent pad " utilized/changed  Intervention: Prevent and Manage VTE (Venous Thromboembolism) Risk  Recent Flowsheet Documentation  Taken 5/20/2024 1525 by Pia Taylor RN  VTE Prevention/Management: SCDs (sequential compression devices) on  Taken 5/20/2024 0857 by Pia Taylor RN  VTE Prevention/Management: SCDs (sequential compression devices) off  Intervention: Prevent Infection  Recent Flowsheet Documentation  Taken 5/20/2024 1525 by Pia Taylor RN  Infection Prevention: rest/sleep promoted  Taken 5/20/2024 0857 by Pia Taylor RN  Infection Prevention: rest/sleep promoted  Goal: Optimal Comfort and Wellbeing  5/20/2024 1853 by Pia Taylor RN  Outcome: Progressing  5/20/2024 1847 by Pia Taylor RN  Outcome: Progressing  Intervention: Monitor Pain and Promote Comfort  Recent Flowsheet Documentation  Taken 5/20/2024 1410 by Pia Taylor RN  Pain Management Interventions: medication (see MAR)     Problem: Skin Injury Risk Increased  Goal: Skin Health and Integrity  5/20/2024 1853 by Pia Taylor RN  Outcome: Progressing  5/20/2024 1847 by Pia Taylor RN  Outcome: Progressing  Intervention: Plan: Nurse Driven Intervention: Moisture Management  Recent Flowsheet Documentation  Taken 5/20/2024 1525 by Pia Taylor RN  Moisture Interventions:   Encourage regular toileting   Incontinence pad  Taken 5/20/2024 0857 by Pia Taylor RN  Moisture Interventions:   Encourage regular toileting   Incontinence pad  Bathing/Skin Care:   bath, chlorhexidine liquid   bath, complete   linen changed  Plan: Moisture Management:   no brief in bed   encourage regular toileting  Intervention: Optimize Skin Protection  Recent Flowsheet Documentation  Taken 5/20/2024 1525 by Pia Taylor RN  Activity Management:   activity adjusted per tolerance   ambulated to bathroom   back to bed  Head of Bed (HOB) Positioning: HOB at 20-30 degrees  Taken 5/20/2024 0857 by Brandon  Pia GUERRERO RN  Pressure Reduction Devices: positioning supports utilized  Activity Management: ambulated to bathroom  Head of Bed (HOB) Positioning: HOB at 20-30 degrees

## 2024-05-20 NOTE — ANESTHESIA PREPROCEDURE EVALUATION
Anesthesia Pre-Procedure Evaluation    Patient: Steve Morgan   MRN: [id number] : 1960        Procedure : Procedure(s):  INCISION AND DRAINAGE, KNEE          Past Medical History:   Diagnosis Date    Cellulitis and abscess of trunk 2017    Depression     Hyperlipidemia LDL goal <100 10/31/2010    Hypertension goal BP (blood pressure) < 140/90 3/17/2011    Hypothyroidism 2010    Morbid obesity due to excess calories (H) 2010    Neuropathy in diabetes (H) 2010    Obesity 2010    Sleep apnea 2010    CPAP    Type 2 diabetes, HbA1C goal < 8% (H) 3/8/2011      Past Surgical History:   Procedure Laterality Date    BIOPSY      COLONOSCOPY      EYE SURGERY      GENITOURINARY SURGERY      surg for undescended testicle    IRRIGATION AND DEBRIDEMENT HAND, COMBINED Right 2022    Procedure: Right long finger irrigation and debridement with partial amputation of the right long finger. ;  Surgeon: Colby English MD;  Location: [facility] OR    REPAIR HAMMER TOE BILATERAL  2013    Procedure: REPAIR HAMMER TOE BILATERAL;  Flexor Tenotomy Toes 2,3,4,5 Bilateral Feet;  Surgeon: Saad Bangura DPM;  Location: [facility] OR      No Known Allergies   Social History     Tobacco Use    Smoking status: Never    Smokeless tobacco: Never   Substance Use Topics    Alcohol use: Yes     Comment: Occassionally      Wt Readings from Last 1 Encounters:   24 113.4 kg (250 lb)        Anesthesia Evaluation            ROS/MED HX  ENT/Pulmonary:     (+) sleep apnea,                                       Neurologic:  - neg neurologic ROS     Cardiovascular:     (+) Dyslipidemia hypertension- -   -  - -                                      METS/Exercise Tolerance: >4 METS    Hematologic:  - neg hematologic  ROS     Musculoskeletal:  - neg musculoskeletal ROS     GI/Hepatic:     (+) GERD, Asymptomatic on medication,                  Renal/Genitourinary:     (+) renal disease, type: CRI, Pt does not require  "dialysis,           Endo:     (+)  type II DM,        thyroid problem, hypothyroidism,    Obesity,       Psychiatric/Substance Use:  - neg psychiatric ROS     Infectious Disease: Comment: Patellar bursitis      Malignancy:  - neg malignancy ROS     Other:  - neg other ROS          Physical Exam    Airway        Mallampati: II   TM distance: > 3 FB   Neck ROM: full   Mouth opening: > 3 cm    Respiratory Devices and Support         Dental       (+) Modest Abnormalities - crowns, retainers, 1 or 2 missing teeth      Cardiovascular   cardiovascular exam normal       Rhythm and rate: regular and normal     Pulmonary   pulmonary exam normal        breath sounds clear to auscultation           OUTSIDE LABS:  CBC:   Lab Results   Component Value Date    WBC 13.3 (H) 05/20/2024    WBC 13.4 (H) 05/18/2024    HGB 9.4 (L) 05/20/2024    HGB 9.0 (L) 05/18/2024    HCT 27.5 (L) 05/20/2024    HCT 26.7 (L) 05/18/2024     05/20/2024     05/18/2024     BMP:   Lab Results   Component Value Date     (L) 05/20/2024     (L) 05/18/2024    POTASSIUM 4.4 05/20/2024    POTASSIUM 4.3 05/18/2024    CHLORIDE 94 (L) 05/20/2024    CHLORIDE 98 05/18/2024    CO2 22 05/20/2024    CO2 20 (L) 05/18/2024    BUN 21.0 05/20/2024    BUN 23.6 (H) 05/18/2024    CR 1.62 (H) 05/20/2024    CR 1.75 (H) 05/19/2024     (H) 05/20/2024     (H) 05/20/2024     COAGS: No results found for: \"PTT\", \"INR\", \"FIBR\"  POC:   Lab Results   Component Value Date     (H) 05/16/2013     HEPATIC:   Lab Results   Component Value Date    ALBUMIN 4.3 05/14/2024    PROTTOTAL 7.3 05/14/2024    ALT 27 05/14/2024    AST 25 05/14/2024    ALKPHOS 72 05/14/2024    BILITOTAL 0.2 05/14/2024     OTHER:   Lab Results   Component Value Date    LACT 0.9 05/17/2024    A1C 8.0 (H) 05/14/2024    CHAR 8.8 05/20/2024    PHOS 3.2 12/05/2023    TSH 4.08 05/14/2024    T4 1.45 05/14/2024    SED 62 (H) 05/17/2024       Anesthesia Plan    ASA Status:  3    NPO " Status:  NPO Appropriate    Anesthesia Type: General.     - Airway: LMA   Induction: Intravenous.   Maintenance: Inhalation.        Consents    Anesthesia Plan(s) and associated risks, benefits, and realistic alternatives discussed. Questions answered and patient/representative(s) expressed understanding.     - Discussed: Risks, Benefits and Alternatives for the PROCEDURE were discussed     - Discussed with:  Patient       Use of blood products discussed: Yes.     - Discussed with: Patient.     - Consented: consented to blood products            Reason for refusal: other.     Postoperative Care    Pain management: IV analgesics, Oral pain medications.   PONV prophylaxis: Ondansetron (or other 5HT-3), Dexamethasone or Solumedrol     Comments:    Other Comments: Fentanyl PRN, no ketorolac (CKD)           Rufina Franco MD    I have reviewed the pertinent notes and labs in the chart from the past 30 days and (re)examined the patient.  Any updates or changes from those notes are reflected in this note.

## 2024-05-20 NOTE — CONSULTS
[facility]    Infectious Disease Consultation     Date of Admission:  5/17/2024  Date of Consult: 05/20/24    Assessment:  64YM with diabetes complicated by Ckd and obesity, who has been admitted with  right knee pain and pre patellar swelling related to pre patellar bursitis related to MSSA of skin source, without evidence of septic arthritis. Aspirate of bursa is growing MSSA, s/p surgical debridement with excisional prepatellar bursectomy . Operative cxs are pending    -R prepatellar bursitis related to MSSA s/p  excisional prepatellar bursectomy, no evidence of septic arthritis  -Uncontrolled diabetes HbA1c 8%  -Hyponatremia  -CKD  -Obesity    Recommendations:  Follow operative cxs  Discontinue Vancomycin  Continue Cefazolin  Likely oral antibiotics at discharge  Glycemic control    ID will continue to follow    Elizabeth Lester MD    Reason for Consult   Reason for consult: I was asked to evaluate this patient for septic pre patellar bursitis.    Primary Care Physician   Lisa Pierre    Chief Complaint   Right knee pain and swelling    History is obtained from the patient and medical records    History of Present Illness   Steve Morgan is a 64 year old male with poorly controlled diabetes complicated by Ckd and obesity, who has been admitted with  right knee pain and pre patellar swelling related to pre patellar bursitis, without evidence of septic arthritis. Aspirate of bursa is growing MSSA, s/p surgical debridement with excisional prepatellar bursectomy . Operative cxs are pending    Symptoms started acutely after patient reportedly fell in his yard. No fever, chills or other signs of sepsis. Blood cxs were not collected. Patient has remained afebrile during hospital stay. Had leukocytosis of 15.7 kn on admission which has improved to 13.3K today    Past Medical History   I have reviewed this patient's medical history and updated it with pertinent information if needed.   Past  Medical History:   Diagnosis Date    Cellulitis and abscess of trunk 2017    Depression     Hyperlipidemia LDL goal <100 10/31/2010    Hypertension goal BP (blood pressure) < 140/90 3/17/2011    Hypothyroidism 2010    Morbid obesity due to excess calories (H) 2010    Neuropathy in diabetes (H) 2010    Obesity 2010    Sleep apnea 2010    CPAP    Type 2 diabetes, HbA1C goal < 8% (H) 3/8/2011       Past Surgical History   I have reviewed this patient's surgical history and updated it with pertinent information if needed.  Past Surgical History:   Procedure Laterality Date    BIOPSY      COLONOSCOPY      EYE SURGERY      GENITOURINARY SURGERY      surg for undescended testicle    IRRIGATION AND DEBRIDEMENT HAND, COMBINED Right 2022    Procedure: Right long finger irrigation and debridement with partial amputation of the right long finger. ;  Surgeon: Colby English MD;  Location: [facility] OR    REPAIR HAMMER TOE BILATERAL  2013    Procedure: REPAIR HAMMER TOE BILATERAL;  Flexor Tenotomy Toes 2,3,4,5 Bilateral Feet;  Surgeon: Saad Bangura DPM;  Location:  OR       Prior to Admission Medications   Prior to Admission Medications   Prescriptions Last Dose Informant Patient Reported? Taking?   ASPIRIN 81 MG OR TABS Past Week at pm  Yes Yes   Sig: Take 81 mg by mouth every evening   Calcium Carb-Cholecalciferol (CALCIUM 600 + D PO) Past Week at -  Yes Yes   Sig: Take 1 tablet by mouth daily   Continuous Blood Gluc  (FREESTYLE GIULIANA 2 READER) [facility] [place] [facility] [place]   Si Device continuous   Continuous Blood Gluc Sensor (FREESTYLE GIULIANA 2 SENSOR) [facility] [place] [facility] [place] Yes   Si each every 14 days Use 1 sensor every 14 days. Use to read blood sugars per 's instructions.   LEVOXYL 137 MCG tablet Past Week at AM  No Yes   Sig: TAKE 1 TABLET BY MOUTH EVERY DAY   MULTI-VITAMIN OR TABS Past Week at -  Yes Yes   Sig: Take 1 tablet by mouth daily   acetaminophen  (TYLENOL) 500 MG tablet Unknown at -  Yes Yes   Sig: Take 1,000 mg by mouth every 6 hours as needed for mild pain   atenolol (TENORMIN) 25 MG tablet 5/19/2024 at -  No Yes   Sig: TAKE 1 TABLET BY MOUTH EVERY DAY   buPROPion (WELLBUTRIN XL) 150 MG 24 hr tablet Past Week at -  No Yes   Sig: Take 1 tablet (150 mg) by mouth every morning   famotidine (PEPCID) 40 MG tablet Past Week at -  No Yes   Sig: TAKE 1 TABLET BY MOUTH EVERY DAY   ferrous sulfate 325 (65 FE) MG tablet Past Week at -  Yes Yes   Sig: Take 1 tablet by mouth daily (with breakfast).   finasteride (PROSCAR) 5 MG tablet Past Week at PM  Yes Yes   Sig: Take 5 mg by mouth at bedtime   gabapentin (NEURONTIN) 300 MG capsule   No No   Sig: Take 1 capsule (300 mg) by mouth 3 times daily for 5 days   glipiZIDE (GLUCOTROL XL) 10 MG 24 hr tablet Past Week at -  No Yes   Sig: TAKE 1 TABLET (10 MG) BY MOUTH DAILY.   insulin glargine 100 UNIT/ML pen 5/15/2024 at PM  Yes Yes   Sig: Inject 40 Units Subcutaneous at bedtime   insulin pen needle (B-D U/F) 31G X 5 MM miscellaneous DME at DME  No Yes   Sig: USE 4 DAILY OR AS DIRECTED.   latanoprost (XALATAN) 0.005 % ophthalmic solution Past Week at -  Yes Yes   Sig: INSTILL 1 DROP INTO BOTH EYES IN THE EVENING   losartan (COZAAR) 100 MG tablet Past Week at -  No Yes   Sig: Take 1 tablet (100 mg) by mouth daily   metFORMIN (GLUCOPHAGE) 1000 MG tablet Past Week at PM  No Yes   Sig: TAKE 1 TABLET BY MOUTH TWICE A DAY WITH MEALS   mirtazapine (REMERON) 15 MG tablet Past Week at PM  No Yes   Sig: Take 1 tablet (15 mg) by mouth at bedtime   mirtazapine (REMERON) 45 MG tablet Past Week at PM  No Yes   Sig: Take 1 tablet (45 mg) by mouth at bedtime Take along with a 15 mg tablet for a total of 60 mg nightly   pantoprazole (PROTONIX) 40 MG EC tablet Past Week at -  No Yes   Sig: Take 1 tablet (40 mg) by mouth daily as needed for heartburn   simvastatin (ZOCOR) 20 MG tablet Past Week at -  Yes Yes   Sig: Take 20 mg by mouth daily       Facility-Administered Medications: None     Allergies   No Known Allergies    Immunization History   Immunization History   Administered Date(s) Administered    COVID-19 12+ (2023-24) (Pfizer) 10/04/2023    COVID-19 Bivalent 12+ (Pfizer) 10/15/2022, 06/30/2023    COVID-19 MONOVALENT 12+ (Pfizer) 11/06/2021, 10/15/2022    COVID-19 Monovalent Booster 18+ (Moderna) 04/12/2022    COVID-19 Vaccine (Vi) 03/25/2021    DTaP, Unspecified 11/08/2010    Flu, Unspecified 11/15/2016    [id number] Novel Flu 11/04/2009    Hepatitis A (ADULT 19+) 10/16/2018, 04/17/2019    Influenza (H1N1) 11/04/2009    Influenza (IIV3) PF 11/12/1997, 10/29/1998, 11/03/1999, 11/08/2001, 10/11/2002, 10/18/2004, 10/26/2007, 11/24/2008, 11/08/2010, 10/05/2011, 10/01/2012    Influenza Vaccine 18-64 (Flublok) 09/07/2018, 10/24/2019, 11/04/2020, 09/14/2021, 10/15/2022    Influenza Vaccine 65+ (Fluzone HD) 10/04/2023    Influenza Vaccine >6 months,quad, PF 11/05/2013, 10/22/2014, 11/17/2015, 09/07/2017    Influenza Vaccine, 6+MO IM (QUADRIVALENT W/PRESERVATIVES) 11/15/2016    MMR 03/21/1995    Pneumococcal (PCV 7) 06/15/2022    Pneumococcal 20 valent Conjugate (Prevnar 20) 06/15/2022    Pneumococcal 23 valent 10/26/2007, 10/05/2012    TD,PF 7+ (Tenivac) 03/21/1995    TDAP (Adacel,Boostrix) 09/24/2021    TDAP Vaccine (Adacel) 11/08/2010    Typhoid IM 10/16/2018    Zoster recombinant adjuvanted (SHINGRIX) 09/02/2020, 12/03/2020       Social History   I have reviewed this patient's social history and updated it with pertinent information if needed. Steve J Cathy  reports that he has never smoked. He has never used smokeless tobacco. He reports current alcohol use. He reports that he does not use drugs.    Family History   I have reviewed this patient's family history and updated it with pertinent information if needed.   Family History   Problem Relation Age of Onset    Breast Cancer Mother     Hypertension Mother     Thyroid Disease Mother      Depression Mother     Alzheimer Disease Mother 82    Cancer - colorectal Father     Thyroid Disease Father     Depression Father     Other - See Comments Father         bladder polyps    Heart Disease Maternal Grandmother         CHF    Circulatory Paternal Grandmother     Cancer Paternal Grandfather     Diabetes Brother     Diabetes Brother     Asthma Brother        Review of Systems   The 10 point Review of Systems is as per HPI    Physical Exam   Temp: 97.2  F (36.2  C) Temp src: Temporal BP: (!) 144/69 Pulse: 76   Resp: 18 SpO2: 98 % O2 Device: None (Room air) Oxygen Delivery: 6 LPM  Vital Signs with Ranges  Temp:  [96.9  F (36.1  C)-98.4  F (36.9  C)] 97.2  F (36.2  C)  Pulse:  [70-87] 76  Resp:  [7-20] 18  BP: ()/(56-89) 144/69  SpO2:  [92 %-100 %] 98 %  250 lbs 0 oz  Body mass index is 41.6 kg/m .    GENERAL APPEARANCE:  awake  EYES: Eyes grossly normal to inspection  NECK: no adenopathy  RESP: lungs clear   CV: regular rates and rhythm  ABDOMEN: soft, nontender  MS: r knee in dressing  SKIN: no suspicious lesions or rashes    Data   All laboratory data reviewed  Component      Latest Ref Rn 5/20/2024  7:20 AM   WBC      4.0 - 11.0 10e3/uL 13.3 (H)    RBC Count      4.40 - 5.90 10e6/uL 3.14 (L)    Hemoglobin      13.3 - 17.7 g/dL 9.4 (L)    Hematocrit      40.0 - 53.0 % 27.5 (L)    MCV      78 - 100 fL 88    MCH      26.5 - 33.0 pg 29.9    MCHC      31.5 - 36.5 g/dL 34.2    RDW      10.0 - 15.0 % 13.2    Platelet Count      150 - 450 10e3/uL 274    % Neutrophils      % 82       Component      Latest Ref Rn 5/20/2024  7:20 AM   Sodium      135 - 145 mmol/L 128 (L)    Potassium      3.4 - 5.3 mmol/L 4.4    Chloride      98 - 107 mmol/L 94 (L)    Carbon Dioxide (CO2)      22 - 29 mmol/L 22    Anion Gap      7 - 15 mmol/L 12    Urea Nitrogen      8.0 - 23.0 mg/dL 21.0    Creatinine      0.67 - 1.17 mg/dL 1.62 (H)    GFR Estimate      >60 mL/min/1.73m2 47 (L)    Calcium      8.8 - 10.2 mg/dL 8.8     Glucose      70 - 99 mg/dL 190 (H)          Microbiology  05/20/2024 1025 05/20/2024 1033 Anaerobic Bacterial Culture Routine [[id number]]   Tissue from Knee, Right    In process Component Value   No component results             05/20/2024 1025 05/20/2024 1509 Gram Stain [[id number]]   (Abnormal)   Tissue from Knee, Right    Final result Component Value   Culture See corresponding culture for results   Gram Stain Result 3+ Gram positive cocci Abnormal    Gram Stain Result 4+ WBC seen Abnormal    Predominantly PMNs          05/20/2024 1025 05/20/2024 1033 Tissue Aerobic Bacterial Culture Routine [[id number]]   Tissue from Knee, Right    In process Component Value   No component results             05/20/2024 1021 05/20/2024 1031 Anaerobic Bacterial Culture Routine [[id number]]   Synovial fluid from Knee, Right    In process Component Value   No component results             05/20/2024 1021 05/20/2024 1033 Synovial fluid Aerobic Bacterial Culture Routine [[id number]]   Synovial fluid from Knee, Right    In process Component Value   No component results        05/17/2024 2253 05/20/2024 0749 Aspirate Aerobic Bacterial Culture Routine With Gram Stain [[id number]]    (Abnormal)   Aspirate from Synovial Bursa    Final result Component Value   Culture 4+ Staphylococcus aureus Abnormal    Gram Stain Result 1+ Gram positive cocci Abnormal     2+ WBC seen Abnormal     Predominantly PMNs       Susceptibility     Staphylococcus aureus     JACKIE     Ciprofloxacin <=0.5 ug/mL Susceptible *     Clindamycin 0.25 ug/mL Susceptible     Daptomycin 0.25 ug/mL Susceptible     Doxycycline <=0.5 ug/mL Susceptible     Erythromycin <=0.25 ug/mL Susceptible     Gentamicin <=0.5 ug/mL Susceptible     Inducible macrolide resistance test Negative ug/mL Negative *     Levofloxacin <=0.12 ug/mL Susceptible *     Linezolid 2 ug/mL Susceptible *     Moxifloxacin <=0.25 ug/mL Susceptible *     Nitrofurantoin 32 ug/mL Susceptible *      Oxacillin 0.5 ug/mL Susceptible 1     Rifampin <=0.5 ug/mL Susceptible *     Tetracycline <=1 ug/mL Susceptible     Tigecycline <=0.12 ug/mL Susceptible *     Trimethoprim/Sulfamethoxazole <=0.5/9.5 u... Susceptible     Vancomycin 1 ug/mL Susceptible

## 2024-05-20 NOTE — ANESTHESIA CARE TRANSFER NOTE
Patient: Steve Morgan    Procedure: Procedure(s):  INCISION AND DRAINAGE, KNEE       Diagnosis: Septic prepatellar bursitis, right [M71.161]  Diagnosis Additional Information: No value filed.    Anesthesia Type:   General     Note:    Oropharynx: oral airway in place  Level of Consciousness: drowsy  Oxygen Supplementation: face mask  Level of Supplemental Oxygen (L/min / FiO2): 6  Independent Airway: airway patency satisfactory and stable  Dentition: dentition unchanged  Vital Signs Stable: post-procedure vital signs reviewed and stable  Report to RN Given: handoff report given  Patient transferred to: PACU    Handoff Report: Identifed the Patient, Identified the Reponsible Provider, Reviewed the pertinent medical history, Discussed the surgical course, Reviewed Intra-OP anesthesia mangement and issues during anesthesia, Set expectations for post-procedure period and Allowed opportunity for questions and acknowledgement of understanding      Vitals:  Vitals Value Taken Time   BP     Temp     Pulse 85 05/20/24 1037   Resp 10 05/20/24 1037   SpO2 100 % 05/20/24 1037   Vitals shown include unfiled device data.    Electronically Signed By: BRIDGET Barragan CRNA  May 20, 2024  10:38 AM

## 2024-05-20 NOTE — BRIEF OP NOTE
[facility]    Brief Operative Note    Pre-operative diagnosis: Septic prepatellar bursitis, right [M71.161]  Post-operative diagnosis Same as pre-operative diagnosis    Procedure: Excisional prepatellar bursectomy right knee    Surgeon: Surgeons and Role:     * Justin Bo MD - Primary     * Moon Melchor PA-C - Assisting  Anesthesia: General   Estimated Blood Loss: Less than 50 ml    Drains: Hemovac  Specimens:   ID Type Source Tests Collected by Time Destination   A : Right prepatellar bursa fluid for culture Aspirate Knee, Right ANAEROBIC BACTERIAL CULTURE ROUTINE, AEROBIC BACTERIAL CULTURE ROUTINE Justin Bo MD 5/20/2024 10:21 AM    B : Right prepattellar tissue for culture Tissue Knee, Right ANAEROBIC BACTERIAL CULTURE ROUTINE, GRAM STAIN, AEROBIC BACTERIAL CULTURE ROUTINE Justin Bo MD 5/20/2024 10:25 AM      Findings:   Alberto purulence in prepatellar bursa   Complications: None.  Implants: * No implants in log *

## 2024-05-21 ENCOUNTER — APPOINTMENT (OUTPATIENT)
Dept: PHYSICAL THERAPY | Facility: CLINIC | Age: 64
DRG: 501 | End: 2024-05-21
Payer: MEDICARE

## 2024-05-21 ENCOUNTER — APPOINTMENT (OUTPATIENT)
Dept: OCCUPATIONAL THERAPY | Facility: CLINIC | Age: 64
DRG: 501 | End: 2024-05-21
Payer: MEDICARE

## 2024-05-21 LAB
CREAT SERPL-MCNC: 1.52 MG/DL (ref 0.67–1.17)
EGFRCR SERPLBLD CKD-EPI 2021: 50 ML/MIN/1.73M2
GLUCOSE BLDC GLUCOMTR-MCNC: 176 MG/DL (ref 70–99)
GLUCOSE BLDC GLUCOMTR-MCNC: 224 MG/DL (ref 70–99)
GLUCOSE BLDC GLUCOMTR-MCNC: 243 MG/DL (ref 70–99)
GLUCOSE BLDC GLUCOMTR-MCNC: 289 MG/DL (ref 70–99)
GLUCOSE BLDC GLUCOMTR-MCNC: 301 MG/DL (ref 70–99)
HGB BLD-MCNC: 8.5 G/DL (ref 13.3–17.7)

## 2024-05-21 PROCEDURE — 97530 THERAPEUTIC ACTIVITIES: CPT | Mod: GO

## 2024-05-21 PROCEDURE — 250N000011 HC RX IP 250 OP 636

## 2024-05-21 PROCEDURE — 97535 SELF CARE MNGMENT TRAINING: CPT | Mod: GO

## 2024-05-21 PROCEDURE — 250N000013 HC RX MED GY IP 250 OP 250 PS 637: Performed by: INTERNAL MEDICINE

## 2024-05-21 PROCEDURE — 999N000127 HC STATISTIC PERIPHERAL IV START W US GUIDANCE

## 2024-05-21 PROCEDURE — 36415 COLL VENOUS BLD VENIPUNCTURE: CPT | Performed by: INTERNAL MEDICINE

## 2024-05-21 PROCEDURE — 82565 ASSAY OF CREATININE: CPT | Performed by: INTERNAL MEDICINE

## 2024-05-21 PROCEDURE — 120N000001 HC R&B MED SURG/OB

## 2024-05-21 PROCEDURE — 250N000013 HC RX MED GY IP 250 OP 250 PS 637

## 2024-05-21 PROCEDURE — 99232 SBSQ HOSP IP/OBS MODERATE 35: CPT | Performed by: HOSPITALIST

## 2024-05-21 PROCEDURE — 99232 SBSQ HOSP IP/OBS MODERATE 35: CPT | Performed by: SPECIALIST

## 2024-05-21 PROCEDURE — 85018 HEMOGLOBIN: CPT

## 2024-05-21 PROCEDURE — 97116 GAIT TRAINING THERAPY: CPT | Mod: GP

## 2024-05-21 PROCEDURE — 250N000011 HC RX IP 250 OP 636: Performed by: SPECIALIST

## 2024-05-21 PROCEDURE — 97530 THERAPEUTIC ACTIVITIES: CPT | Mod: GP

## 2024-05-21 PROCEDURE — 97166 OT EVAL MOD COMPLEX 45 MIN: CPT | Mod: GO

## 2024-05-21 RX ORDER — ASPIRIN 81 MG/1
81 TABLET ORAL 2 TIMES DAILY
Qty: 60 TABLET | Refills: 0 | Status: SHIPPED | OUTPATIENT
Start: 2024-05-21

## 2024-05-21 RX ORDER — AMOXICILLIN 250 MG
1 CAPSULE ORAL 2 TIMES DAILY PRN
Qty: 20 TABLET | Refills: 0 | Status: SHIPPED | OUTPATIENT
Start: 2024-05-21

## 2024-05-21 RX ORDER — ACETAMINOPHEN 325 MG/1
650 TABLET ORAL EVERY 4 HOURS PRN
COMMUNITY
Start: 2024-05-23

## 2024-05-21 RX ORDER — CEFAZOLIN SODIUM 2 G/100ML
2 INJECTION, SOLUTION INTRAVENOUS EVERY 8 HOURS
Status: DISCONTINUED | OUTPATIENT
Start: 2024-05-21 | End: 2024-05-21

## 2024-05-21 RX ORDER — CEFAZOLIN SODIUM 2 G/100ML
2 INJECTION, SOLUTION INTRAVENOUS EVERY 8 HOURS
Status: DISCONTINUED | OUTPATIENT
Start: 2024-05-21 | End: 2024-05-22 | Stop reason: HOSPADM

## 2024-05-21 RX ORDER — OXYCODONE HYDROCHLORIDE 5 MG/1
5-10 TABLET ORAL EVERY 4 HOURS PRN
Qty: 20 TABLET | Refills: 0 | Status: SHIPPED | OUTPATIENT
Start: 2024-05-21 | End: 2024-07-09

## 2024-05-21 RX ADMIN — ACETAMINOPHEN 975 MG: 325 TABLET, FILM COATED ORAL at 13:47

## 2024-05-21 RX ADMIN — HYDROXYZINE HYDROCHLORIDE 10 MG: 10 TABLET ORAL at 08:12

## 2024-05-21 RX ADMIN — LATANOPROST 1 DROP: 50 SOLUTION/ DROPS OPHTHALMIC at 22:08

## 2024-05-21 RX ADMIN — CEFAZOLIN SODIUM 2 G: 2 INJECTION, SOLUTION INTRAVENOUS at 12:24

## 2024-05-21 RX ADMIN — MIRTAZAPINE 15 MG: 15 TABLET, FILM COATED ORAL at 22:07

## 2024-05-21 RX ADMIN — FINASTERIDE 5 MG: 5 TABLET, FILM COATED ORAL at 20:29

## 2024-05-21 RX ADMIN — ACETAMINOPHEN 975 MG: 325 TABLET, FILM COATED ORAL at 22:06

## 2024-05-21 RX ADMIN — LOSARTAN POTASSIUM 100 MG: 100 TABLET, FILM COATED ORAL at 08:13

## 2024-05-21 RX ADMIN — ATENOLOL 25 MG: 25 TABLET ORAL at 08:13

## 2024-05-21 RX ADMIN — ASPIRIN 81 MG: 81 TABLET, COATED ORAL at 20:29

## 2024-05-21 RX ADMIN — HYDROXYZINE HYDROCHLORIDE 10 MG: 10 TABLET ORAL at 20:38

## 2024-05-21 RX ADMIN — CEFAZOLIN SODIUM 2 G: 2 INJECTION, SOLUTION INTRAVENOUS at 20:31

## 2024-05-21 RX ADMIN — LEVOTHYROXINE SODIUM 137 MCG: 0.11 TABLET ORAL at 08:12

## 2024-05-21 RX ADMIN — SENNOSIDES AND DOCUSATE SODIUM 1 TABLET: 50; 8.6 TABLET ORAL at 08:11

## 2024-05-21 RX ADMIN — FAMOTIDINE 20 MG: 20 TABLET ORAL at 20:29

## 2024-05-21 RX ADMIN — FERROUS SULFATE TAB 325 MG (65 MG ELEMENTAL FE) 325 MG: 325 (65 FE) TAB at 08:12

## 2024-05-21 RX ADMIN — MIRTAZAPINE 45 MG: 15 TABLET, FILM COATED ORAL at 22:07

## 2024-05-21 RX ADMIN — SENNOSIDES AND DOCUSATE SODIUM 1 TABLET: 50; 8.6 TABLET ORAL at 20:29

## 2024-05-21 RX ADMIN — CEFAZOLIN SODIUM 2 G: 2 INJECTION, SOLUTION INTRAVENOUS at 01:34

## 2024-05-21 RX ADMIN — ACETAMINOPHEN 975 MG: 325 TABLET, FILM COATED ORAL at 05:11

## 2024-05-21 RX ADMIN — MAGNESIUM HYDROXIDE 30 ML: 400 SUSPENSION ORAL at 06:04

## 2024-05-21 RX ADMIN — POLYETHYLENE GLYCOL 3350 17 G: 17 POWDER, FOR SOLUTION ORAL at 08:11

## 2024-05-21 RX ADMIN — BUPROPION HYDROCHLORIDE 150 MG: 150 TABLET, EXTENDED RELEASE ORAL at 08:13

## 2024-05-21 RX ADMIN — SIMVASTATIN 20 MG: 10 TABLET, FILM COATED ORAL at 08:12

## 2024-05-21 RX ADMIN — ASPIRIN 81 MG: 81 TABLET, COATED ORAL at 08:13

## 2024-05-21 RX ADMIN — OXYCODONE HYDROCHLORIDE 5 MG: 5 TABLET ORAL at 08:12

## 2024-05-21 ASSESSMENT — ACTIVITIES OF DAILY LIVING (ADL)
ADLS_ACUITY_SCORE: 36
ADLS_ACUITY_SCORE: 36
ADLS_ACUITY_SCORE: 35
ADLS_ACUITY_SCORE: 36
ADLS_ACUITY_SCORE: 35
ADLS_ACUITY_SCORE: 36
ADLS_ACUITY_SCORE: 35
ADLS_ACUITY_SCORE: 36
ADLS_ACUITY_SCORE: 35
ADLS_ACUITY_SCORE: 36
DEPENDENT_IADLS:: INDEPENDENT
ADLS_ACUITY_SCORE: 35

## 2024-05-21 NOTE — PLAN OF CARE
"Goal Outcome Evaluation:      Plan of Care Reviewed With: patient    Overall Patient Progress: improving     Outcome Evaluation: Moderate pain with ambulation. Pain managed with schedule Tylenol and PRN oxycodone.    Pt up with 1 assist, GB, walker to the bathroom, voiding clear yellow urine. Constipated per pt. Schedule senna, PRN senna administered. Tolerating oral intake. Blood glucose monitored. Hemovac compressed, no output. Small dried drainage on dressing.    Problem: Adult Inpatient Plan of Care  Goal: Plan of Care Review  Description: The Plan of Care Review/Shift note should be completed every shift.  The Outcome Evaluation is a brief statement about your assessment that the patient is improving, declining, or no change.  This information will be displayed automatically on your shift  note.  Outcome: Progressing  Flowsheets (Taken 5/20/2024 5976)  Outcome Evaluation: Moderate pain with ambulation. Pain managed with schedule Tylenol and PRN oxycodone.  Plan of Care Reviewed With: patient  Overall Patient Progress: improving  Goal: Patient-Specific Goal (Individualized)  Description: You can add care plan individualizations to a care plan. Examples of Individualization might be:  \"Parent requests to be called daily at 9am for status\", \"I have a hard time hearing out of my right ear\", or \"Do not touch me to wake me up as it startles  me\".  Outcome: Progressing  Goal: Absence of Hospital-Acquired Illness or Injury  Outcome: Progressing  Intervention: Identify and Manage Fall Risk  Recent Flowsheet Documentation  Taken 5/20/2024 1920 by Cleo May, RN  Safety Promotion/Fall Prevention:   activity supervised   clutter free environment maintained   safety round/check completed   increased rounding and observation   increase visualization of patient   supervised activity  Intervention: Prevent Skin Injury  Recent Flowsheet Documentation  Taken 5/20/2024 1920 by Cleo May, RN  Body Position: position " changed independently  Skin Protection: adhesive use limited  Device Skin Pressure Protection: absorbent pad utilized/changed  Intervention: Prevent and Manage VTE (Venous Thromboembolism) Risk  Recent Flowsheet Documentation  Taken 5/20/2024 1920 by Cleo May RN  VTE Prevention/Management: SCDs (sequential compression devices) off  Intervention: Prevent Infection  Recent Flowsheet Documentation  Taken 5/20/2024 1920 by Cleo May RN  Infection Prevention: rest/sleep promoted  Goal: Optimal Comfort and Wellbeing  Outcome: Progressing  Intervention: Monitor Pain and Promote Comfort  Recent Flowsheet Documentation  Taken 5/20/2024 2217 by Cleo May RN  Pain Management Interventions: medication (see MAR)  Goal: Readiness for Transition of Care  Outcome: Progressing     Problem: Skin Injury Risk Increased  Goal: Skin Health and Integrity  Outcome: Progressing  Intervention: Plan: Nurse Driven Intervention: Moisture Management  Recent Flowsheet Documentation  Taken 5/20/2024 2000 by Cleo May RN  Moisture Interventions:   Encourage regular toileting   No brief in bed  Bathing/Skin Care: dressed/undressed  Taken 5/20/2024 1920 by Cleo May RN  Moisture Interventions:   Encourage regular toileting   Incontinence pad  Bathing/Skin Care: dressed/undressed  Plan: Moisture Management:   encourage regular toileting   no brief in bed  Intervention: Optimize Skin Protection  Recent Flowsheet Documentation  Taken 5/20/2024 1920 by Cleo May RN  Pressure Reduction Techniques: positioned off wounds  Pressure Reduction Devices: positioning supports utilized  Skin Protection: adhesive use limited  Activity Management:   activity adjusted per tolerance   ambulated to bathroom   back to bed  Head of Bed (HOB) Positioning: HOB at 30 degrees     Problem: Skin or Soft Tissue Infection  Goal: Absence of Infection Signs and Symptoms  Outcome: Progressing  Intervention: Minimize and Manage Infection  Progression  Recent Flowsheet Documentation  Taken 5/20/2024 1920 by Cleo May, RN  Infection Prevention: rest/sleep promoted  Isolation Precautions: contact precautions maintained  Intervention: Provide Meticulous Infection Site Care  Recent Flowsheet Documentation  Taken 5/20/2024 1920 by Cleo May, RN  Topical Inflammation Care: border marked     Problem: Pain Acute  Goal: Optimal Pain Control and Function  Outcome: Progressing  Intervention: Develop Pain Management Plan  Recent Flowsheet Documentation  Taken 5/20/2024 2217 by Cleo May, RN  Pain Management Interventions: medication (see MAR)  Intervention: Prevent or Manage Pain  Recent Flowsheet Documentation  Taken 5/20/2024 1920 by Cleo May, RN  Medication Review/Management: medications reviewed

## 2024-05-21 NOTE — PROGRESS NOTES
05/21/24 0917   Appointment Info   Signing Clinician's Name / Credentials (OT) Hugo Koffi, OTR/L   Living Environment   People in Home significant other;spouse   Current Living Arrangements house   Living Environment Comments Patient reports that he lives in 4 level modified split home. Bed and bathroom on top level with walk in shower and shower chair.   Self-Care   Usual Activity Tolerance moderate   Current Activity Tolerance fair   Equipment Currently Used at Home cane, straight;walker, rolling   Fall history within last six months yes   Number of times patient has fallen within last six months 6   Activity/Exercise/Self-Care Comment Patient reports that he has both a cane and walker for use at home (switches between the two as needed).  Reports that with increased fatigue (later in the day), he tends to have decreased balance and requires use of assistive device with all mobility. The patient is baseline modified independent with ADL and IADL.   General Information   Onset of Illness/Injury or Date of Surgery 05/17/24   Referring Physician Moon Melchor PA-C   Patient/Family Therapy Goal Statement (OT) 64M with hx of IDDM Type II, CKD Stage III, morbid obesity BMI 41, and hypertension presents with right knee pain and swelling. On adm, leukocytosis but no other signs of sepsis. XR of right knee with evidence of prepatellar swelling but no joint effusion. Patellar aspiration performed with 5 mL cloudy fluid removed and sent for analysis   Additional Occupational Profile Info/Pertinent History of Current Problem Get better for a safe discharge home.   Existing Precautions/Restrictions fall   Cognitive Status Examination   Orientation Status orientation to person, place and time   Follows Commands follows one-step commands;over 90% accuracy   Visual Perception   Visual Impairment/Limitations WFL;corrective lenses full-time   Sensory   Sensory Comments Neuropathy in hands and feet causing near total  numbness.   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 R LE impaired 2/2 pain at knee   Strength Comprehensive (MMT)   Comment, General Manual Muscle Testing (MMT) Assessment deficits 2/2 procedure and pain   Coordination   Coordination Comments Impairments noted with ADL   Transfers   Transfers sit-stand transfer;toilet transfer   Sit-Stand Transfer   Sit-Stand [place] (Transfers) contact guard   Toilet Transfer   [place] Level (Toilet Transfer) minimum assist (75% patient effort)   Balance   Balance Comments Unsteady and HIGH fall risk   Clinical Impression   Criteria for Skilled Therapeutic Interventions Met (OT) Yes, treatment indicated   OT Diagnosis decreased ADL and IADL independence   OT Problem List-Impairments impacting ADL problems related to;activity tolerance impaired;balance;mobility;range of motion (ROM);strength;sensation;pain   Assessment of Occupational Performance 5 or more Performance Deficits   Identified Performance Deficits Dressing, bathing, toileting, functional mobility, meal prep, home mgmt, and other IADL   Clinical Decision Making Complexity (OT) detailed assessment/moderate complexity   OT Total Evaluation Time   OT John Moderate Complexity Minutes (35839) 10   OT Goals   Therapy Frequency (OT) 6 times/week   OT Predicted Duration/Target Date for Goal Attainment 05/28/24   OT Goals Hygiene/Grooming;Upper Body Dressing;Lower Body Dressing;Toilet Transfer/Toileting;OT Goal 1   OT: Hygiene/Grooming supervision/stand-by assist;using adaptive equipment   OT: Upper Body Dressing Supervision/stand-by assist;using adaptive equipment   OT: Lower Body Dressing Supervision/stand-by assist;using adaptive equipment   OT: Toilet Transfer/Toileting Supervision/stand-by assist;toilet transfer;cleaning and garment management;using  adaptive equipment   OT: Goal 1 the patient will demonstrate a shower transfer with SBA and use of AE   Interventions   Interventions Quick Adds Therapeutic Activity   Self-Care/Home Management   Self-Care/Home Mgmt/ADL, Compensatory, Meal Prep Minutes (20993) 25   Symptoms Noted During/After Treatment (Meal Preparation/Planning Training) fatigue;increased pain;shortness of breath   Treatment Detail/Skilled Intervention Engaged pt in ADL training to progress independence and safety w/ daily activities. LB dressing in recliner completed. Pt w/ difficulty threading LEs 2/2 RLE ROM deficits. Cues for technique and pt able to thread shorts up to knees w/ SBA. Standing for hiking shorts and pt mildly imulsive, LOB posteriorly and min A for controlled descent to chair. Cues to use FWW and reset balance throughout dressing for increased stability. Standing again w/ FWW, CGA, and dressing LB w/ VCs and min A. Completeting toilet transfer w/ min A and use of grab bar. Requires min A w/ pivot to toilet 2/2 LOB, cues for grab bar use and controlled descent. Min A garment mgmt w/ toileting and cues to maintain balance w/ grab bar throughout as pt unsteady. Provided education to patient on grab bar installation and placement for shower and toileting. Edu on use of shower chair for bathing.  Discussed discharge recommendations with pt w/ home vs. TCU and assist needed at home if d/c home.   Therapeutic Activities   Therapeutic Activity Minutes (60550) 9   Symptoms noted during/after treatment fatigue;shortness of breath;increased pain   Treatment Detail/Skilled Intervention STS from x2 from recliner. Cues to wait for FWW to be present. Pt requires CGA to stand and VCs to push from arm rest w/ stand. Ambulating in room w/ FWW 20 feet x2 to and from toilet. Requires min A w/ FWW. Benefits from cues for FWW proximity and management w/ household mobility such as in/out of bathroom and walking around bed. Seated in recliner w/ CGA.  Positioned pt w/ LEs elevated, alarm on, and all needs in reach.   OT Discharge Planning   OT Plan shower tranfer, TB dress w/ retrieval, toileting   OT Discharge Recommendation (DC Rec) Transitional Care Facility   OT Rationale for DC Rec The patient presents below basline and limited by impairments in balance, strength, ROM, activity tolerance, and sensation. Pt is currenlty requiring Ax1 for functional mobility and ADL. The patient does not have 24/7 Ax1 available at home as family works out of the home. Recommend TCU at discharge to progress safety and independence with ADL prior to return home.   OT Brief overview of current status min A x1 dressing, transfers, and toileting   Total Session Time   Timed Code Treatment Minutes 34   Total Session Time (sum of timed and untimed services) 44        1

## 2024-05-21 NOTE — PROGRESS NOTES
[facility]    Medicine Progress Note - Hospitalist Service    Date of Admission:  5/17/2024    Assessment & Plan   64M with hx of IDDM Type II, CKD Stage III, morbid obesity BMI 41, and hypertension presents with right knee pain and swelling. On adm, leukocytosis but no other signs of sepsis. XR of right knee with evidence of prepatellar swelling but no joint effusion.  Patellar aspiration performed with 5 mL cloudy fluid removed and sent for analysis   Aspirate is not demonstrating staph aureus (awaiting sensitivities).    1/.  Prepatellar septic bursitis with isolated Staph aureus  - cefazolin  -Blood cultures remain no growth up-to-date, wound cultures with staph aureus  -No significant worsening uncontrolled pain  -Appreciate continuous input from orthopedic service.  Underwent I&D    2/.  Type 2 diabetes mellitus probably ongoing for 15 to 20 years with multiple endorgan issues including peripheral neuropathy and CKD.  Resume several of the home medications continue with Accu-Cheks and sliding scale insulin.  -Resuming PTA lantus of 40 today, glucose levels higher today, but given renal impairment will assess his clinical response to higher dose of lantus.  -Hold his metformin given n.p.o. status  -Creatinine remained stable at 1.6    3/.  Peripheral neuropathy significant in nature, falls precautions to be instituted discussed with patient and bedside nursing stating that he does need to use his walker and ask for help when he is trying to move around.    4/.  Other medical issues including dyslipidemia hypothyroid state: Resume prior to admission medicines  Continue inpatient care    5.  Chronic hyponatremia/likely has pseudohyponatremia component with uncontrolled hyperglycemia  -Stable hyponatremia        Diet: Advance Diet as Tolerated: Regular Diet Adult  Diet    DVT Prophylaxis: Pneumatic Compression Devices and Ambulate every shift  [place] Catheter: Not present  Lines: None    "  Cardiac Monitoring: None  Code Status: Full Code      Clinically Significant Risk Factors         # Hyponatremia: Lowest Na = 128 mmol/L in last 2 days, will monitor as appropriate          # Hypertension: Noted on problem list       # DMII: A1C = 8.0 % (Ref range: 0.0 - 5.6 %) within past 6 months, PRESENT ON ADMISSION  # Severe Obesity: Estimated body mass index is 41.6 kg/m  as calculated from the following:    Height as of this encounter: 1.651 m (5' 5\").    Weight as of this encounter: 113.4 kg (250 lb)., PRESENT ON ADMISSION            Disposition Plan     Medically Ready for Discharge: Anticipated in 2-4 Days             John Smith MD  Hospitalist Service  [facility]  Securely message with [id number] (more info)  Text page via [employer] Paging/Directory   ______________________________________________________________________    Interval History   Continuing medicine service care  Chart reviewed seen and examined.  Case discussed with nursing service was gracious to be present at bedside during my encounter  Family updated over the phone during my encounter  Orthopedic service have plans for I&D this morning  No reported issues of nausea, vomiting or diarrhea overnight.  Remained afebrile.  Not requiring oxygen support.  No mental status changes.        Physical Exam   Vital Signs: Temp: 96.8  F (36  C) Temp src: Temporal BP: (!) 155/74 Pulse: 61   Resp: 16 SpO2: 98 % O2 Device: None (Room air)    Weight: 250 lbs 0 oz    HEENT; Atraumatic, normocephalic, pinkish conjuctiva, pupils bilateral reactive   Skin: warm and moist, no rashes  Lungs: equal chest expansion, clear to auscultation, no wheezes, no stridor, no crackles,   Heart: normal rate, normal rhythm, no rubs or gallops.   Abdomen: normal bowel sounds, no tenderness, no peritoneal signs, no guarding  Extremities: no deformities, minimal swelling on right knee area, no significant tenderness  Neuro; follow commands, alert and " oriented x3, spontaneous speech, coherent, moves all extremities spontaneously  Psych; no hallucination, euthymic mood, not agitated      Medical Decision Making       50 MINUTES SPENT BY ME on the date of service doing chart review, history, exam, documentation & further activities per the note.  MANAGEMENT DISCUSSED with the following over the past 24 hours: Yes   NOTE(S)/MEDICAL RECORDS REVIEWED over the past 24 hours: Yes       Data     I have personally reviewed the following data over the past 24 hrs:    N/A  \   8.5 (L)   / N/A     N/A N/A N/A /  301 (H)   N/A N/A 1.52 (H) \       Imaging results reviewed over the past 24 hrs:   No results found for this or any previous visit (from the past 24 hour(s)).

## 2024-05-21 NOTE — DISCHARGE INSTRUCTIONS
Your home care referral was sent to [facility]  If you haven't heard from them within the next 24-48 hours,  Please call them at [phone]

## 2024-05-21 NOTE — CONSULTS
Care Management Initial Consult    General Information  Assessment completed with: Caio Adame  Type of CM/SW Visit: Initial Assessment    Primary Care Provider verified and updated as needed: Yes   Readmission within the last 30 days: no previous admission in last 30 days      Reason for Consult: discharge planning  Advance Care Planning: Advance Care Planning Reviewed: no concerns identified       Communication Assessment  Patient's communication style: spoken language (English or Bilingual)    Hearing Difficulty or Deaf: no   Wear Glasses or Blind: yes    Cognitive  Cognitive/Neuro/Behavioral: WDL  Level of Consciousness: alert                   Living Environment:   People in home: spouse, child(roverto), adult  Sri Kearney  Current living Arrangements: house      Able to return to prior arrangements: yes       Family/Social Support:  Care provided by: self, spouse/significant other  Provides care for: no one  Marital Status:   Wife, Children  Sri       Description of Support System: Supportive, Involved    Support Assessment: Adequate family and caregiver support, Adequate social supports    Current Resources:   Patient receiving home care services: No     Community Resources: None  Equipment currently used at home: cane, straight, walker, standard, shower chair (CPAP)  Supplies currently used at home: Diabetic Supplies      Lifestyle & Psychosocial Needs:  Social Determinants of Health     Food Insecurity: No Food Insecurity (2/8/2024)    Received from [facility] Replaced by [facility], [employer] Replaced by [facility]    Food Insecurity     Worried About Running Out of Food in the Last Year: 1   Depression: Not at risk (5/1/2024)    PHQ-2     PHQ-2 Score: 2   Recent Concern: Depression - At risk (4/10/2024)    PHQ-2     PHQ-2 Score: 4   Housing Stability: Low Risk  (2/8/2024)    Received from [facility] Replaced by [facility], [facility]  [facility]    Housing Stability     Unable to Pay for Housing in the Last Year: 1   Tobacco Use: Low Risk  (4/10/2024)    Patient History     Smoking Tobacco Use: Never     Smokeless Tobacco Use: Never     Passive Exposure: Not on file   Financial Resource Strain: Low Risk  (2/8/2024)    Received from [facility], [facility]    Financial Resource Strain     Difficulty of Paying Living Expenses: 3     Difficulty of Paying Living Expenses: Not on file   Alcohol Use: Not At Risk (6/25/2023)    AUDIT-C     Frequency of Alcohol Consumption: Monthly or less     Average Number of Drinks: Patient does not drink     Frequency of Binge Drinking: Never   Transportation Needs: No Transportation Needs (2/8/2024)    Received from [facility], [facility]    Transportation Needs     Lack of Transportation (Medical): 1   Physical Activity: Inactive (6/25/2023)    Exercise Vital Sign     Days of Exercise per Week: 0 days     Minutes of Exercise per Session: 0 min   Interpersonal Safety: Low Risk  (11/17/2023)    Interpersonal Safety     Do you feel physically and emotionally safe where you currently live?: Yes     Within the past 12 months, have you been hit, slapped, kicked or otherwise physically hurt by someone?: No     Within the past 12 months, have you been humiliated or emotionally abused in other ways by your partner or ex-partner?: No   Stress: Stress Concern Present (6/25/2023)    [employer] - Occupational Stress Questionnaire     Feeling of Stress : To some extent   Social Connections: Socially Integrated (2/8/2024)    Received from [facility], [facility]    Social Connections     Frequency of Communication with Friends and Family: 0   Health Literacy: Not on file       Functional Status:  Prior  to admission patient needed assistance:   Dependent ADLs:: Independent, Ambulation-cane, Ambulation-walker  Dependent IADLs:: Independent          Additional Information:  Patient was admitted on 5/17/24 with right knee pain and swelling, dx: Prepatellar septic bursitis with isolated Staph aureus. Care management consulted to assist with discharge planning and elevated re admission risk score of 20%.    Met with patient at bedside. Address, contact info, and PCP confirmed. Patient lives in a home with his wife and son and is baseline independent with all ADLs. Therapies are currently recommending patient discharge to TCU at discharge for continued therapies and strengthening. Discussed therapy recs with patient and he very politely declines discharging to TCU. He has been to [facility] in the past and had a good experience however, after discussion with wife Sri they feel like he can manage well at home with home cares instead. SNF packet provided in case he should change his mind.     Referral sent to [facility] for RN PT OT (referral was accepted). Patient has used [facility] in the past and would like to use them again if they have availability. Pt's wife Sri will transport patient back to home at time of discharge. Will continue to follow for discharge planning.         Janet Wakefield RN  Care Coordinator  [facility]  [phone]

## 2024-05-21 NOTE — PROGRESS NOTES
[facility]    Infectious Disease Progress Note    Date of Service : 05/21/2024     Assessment:  64YM with diabetes complicated by Ckd and obesity, who has been admitted with  right knee pain and pre patellar swelling related to pre patellar bursitis related to MSSA of skin source, without evidence of septic arthritis. Aspirate of bursa grew MSSA, s/p surgical debridement with excisional prepatellar bursectomy . Operative cxs are pending, stain showed GPC     -R prepatellar bursitis related to MSSA s/p  excisional prepatellar bursectomy, no evidence of septic arthritis  -Uncontrolled diabetes HbA1c 8%  -Hyponatremia  -CKD  -Obesity     Recommendations:  Maintain on Cefazolin  Follow operative cxs  Likely transition to oral antibiotics once clinically improved      Elizabeth Lester MD    Interval History   Feels better, has remained afebrile. Tolerating antibiotics without side effects  Knee symptoms are improving, notes pain on standing uo, hemovac was pulled out this morning. Complains of some urinary burning after catheter was placed yesterday and complains of constipation      Physical Exam   Temp: (!) 96.7  F (35.9  C) Temp src: Temporal BP: 139/69 Pulse: 64   Resp: 16 SpO2: 97 % O2 Device: None (Room air) Oxygen Delivery: 6 LPM  Vitals:    05/17/24 1740   Weight: 113.4 kg (250 lb)     Vital Signs with Ranges  Temp:  [96.7  F (35.9  C)-97.2  F (36.2  C)] 96.7  F (35.9  C)  Pulse:  [59-87] 64  Resp:  [7-20] 16  BP: ()/(59-78) 139/69  SpO2:  [92 %-100 %] 97 %    Constitutional: Awake, alert, cooperative, no apparent distress  Lungs: non labored breathing  Skin: No rash  MS : R knee in post op dressing, surrounding erythema noted, swelling    Other:    Medications   Current Facility-Administered Medications   Medication Dose Route Frequency Provider Last Rate Last Admin    lactated ringers infusion   Intravenous Continuous Moon Melchor PA-C   Stopped at 05/21/24 0511     Current  Facility-Administered Medications   Medication Dose Route Frequency Provider Last Rate Last Admin    acetaminophen (TYLENOL) tablet 975 mg  975 mg Oral Q8H Moon Melchor PA-C   975 mg at 05/21/24 0511    aspirin EC tablet 81 mg  81 mg Oral BID Moon Melchor PA-C   81 mg at 05/21/24 0813    atenolol (TENORMIN) tablet 25 mg  25 mg Oral Daily John Coleman MD   25 mg at 05/21/24 0813    buPROPion (WELLBUTRIN XL) 24 hr tablet 150 mg  150 mg Oral QAM John Coleman MD   150 mg at 05/21/24 0813    famotidine (PEPCID) tablet 20 mg  20 mg Oral Daily at 8 pm Moon Melchor PA-C   20 mg at 05/20/24 2007    Or    famotidine (PEPCID) injection 20 mg  20 mg Intravenous Daily at 8 pm Moon Melchor PA-C        ferrous sulfate (FEROSUL) tablet 325 mg  325 mg Oral Daily with breakfast Mirtha Vargas MD   325 mg at 05/21/24 0812    finasteride (PROSCAR) tablet 5 mg  5 mg Oral Daily John Coleman MD   5 mg at 05/20/24 2007    insulin aspart (NovoLOG) injection (RAPID ACTING)   Subcutaneous TID AC Maxwell Cantrell MD   6 Units at 05/21/24 0901    insulin aspart (NovoLOG) injection (RAPID ACTING)  1-10 Units Subcutaneous TID  Maxwell Cantrell MD   5 Units at 05/21/24 0823    insulin aspart (NovoLOG) injection (RAPID ACTING)  1-7 Units Subcutaneous At Bedtime Maxwell Cantrell MD   4 Units at 05/20/24 2317    insulin glargine (LANTUS PEN) injection 40 Units  40 Units Subcutaneous QAM  Maxwell Cantrell MD   40 Units at 05/21/24 0823    latanoprost (XALATAN) 0.005 % ophthalmic solution 1 drop  1 drop Both Eyes At Bedtime John Coleman MD   1 drop at 05/20/24 2316    levothyroxine (SYNTHROID/LEVOTHROID) tablet 137 mcg  137 mcg Oral Daily Mirtha Vargas MD   137 mcg at 05/21/24 0812    losartan (COZAAR) tablet 100 mg  100 mg Oral Daily Mirtha Vargas MD   100 mg at 05/21/24 0813    [Held by provider] metFORMIN (GLUCOPHAGE) tablet 1,000 mg  1,000 mg  Oral BID w/meals Mirtha Vargas MD   1,000 mg at 05/19/24 1743    mirtazapine (REMERON) tablet 15 mg  15 mg Oral At Bedtime Mirtha Vargas MD   15 mg at 05/20/24 2221    mirtazapine (REMERON) tablet 45 mg  45 mg Oral At Bedtime Mirtha Vargas MD   45 mg at 05/20/24 2223    polyethylene glycol (MIRALAX) Packet 17 g  17 g Oral Daily Moon Melchor PA-C   17 g at 05/21/24 0811    senna-docusate (SENOKOT-S/PERICOLACE) 8.6-50 MG per tablet 1 tablet  1 tablet Oral BID Moon Melchor PA-C   1 tablet at 05/21/24 0811    simvastatin (ZOCOR) tablet 20 mg  20 mg Oral Daily Mirtha Vargas MD   20 mg at 05/21/24 0812    sodium chloride (PF) 0.9% PF flush 3 mL  3 mL Intracatheter Q8H Moon Melchor PA-C   3 mL at 05/21/24 0510       Data   All microbiology laboratory data reviewed.  Recent Labs   Lab Test 05/21/24  0658 05/20/24  0720 05/18/24  0545 05/17/24  1814   WBC  --  13.3* 13.4* 15.7*   HGB 8.5* 9.4* 9.0* 10.5*   HCT  --  27.5* 26.7* 30.5*   MCV  --  88 89 88   PLT  --  274 241 291     Recent Labs   Lab Test 05/21/24  0658 05/20/24  0720 05/19/24  0830   CR 1.52* 1.62* 1.75*     Recent Labs   Lab Test 05/17/24  1814   SED 62*     05/20/2024 1025 05/20/2024 1033 Anaerobic Bacterial Culture Routine [[id number]]   Tissue from Knee, Right    In process Component Value   No component results             05/20/2024 1025 05/20/2024 1509 Gram Stain [[id number]]   (Abnormal)   Tissue from Knee, Right    Final result Component Value   Culture See corresponding culture for results   Gram Stain Result 3+ Gram positive cocci Abnormal    Gram Stain Result 4+ WBC seen Abnormal    Predominantly PMNs          05/20/2024 1025 05/20/2024 1033 Tissue Aerobic Bacterial Culture Routine [[id number]]   Tissue from Knee, Right    In process Component Value   No component results             05/20/2024 1021 05/20/2024 1031 Anaerobic Bacterial Culture Routine [[id number]]   Synovial fluid from Knee, Right    In process Component  Value   No component results             05/20/2024 1021 05/20/2024 1033 Synovial fluid Aerobic Bacterial Culture Routine [[id number]]   Synovial fluid from Knee, Right    In process Component Value   No component results             05/17/2024 2253 05/20/2024 0749 Aspirate Aerobic Bacterial Culture Routine With Gram Stain [[id number]]    (Abnormal)   Aspirate from Synovial Bursa    Final result Component Value   Culture 4+ Staphylococcus aureus Abnormal    Gram Stain Result 1+ Gram positive cocci Abnormal     2+ WBC seen Abnormal     Predominantly PMNs       Susceptibility     Staphylococcus aureus     JACKIE     Ciprofloxacin <=0.5 ug/mL Susceptible *     Clindamycin 0.25 ug/mL Susceptible     Daptomycin 0.25 ug/mL Susceptible     Doxycycline <=0.5 ug/mL Susceptible     Erythromycin <=0.25 ug/mL Susceptible     Gentamicin <=0.5 ug/mL Susceptible     Inducible macrolide resistance test Negative ug/mL Negative *     Levofloxacin <=0.12 ug/mL Susceptible *     Linezolid 2 ug/mL Susceptible *     Moxifloxacin <=0.25 ug/mL Susceptible *     Nitrofurantoin 32 ug/mL Susceptible *     Oxacillin 0.5 ug/mL Susceptible 1     Rifampin <=0.5 ug/mL Susceptible *     Tetracycline <=1 ug/mL Susceptible     Tigecycline <=0.12 ug/mL Susceptible *     Trimethoprim/Sulfamethoxazole <=0.5/9.5 u... Susceptible     Vancomycin 1 ug/mL Susceptible

## 2024-05-21 NOTE — PLAN OF CARE
"Goal Outcome Evaluation:      Plan of Care Reviewed With: patient    Overall Patient Progress: improvingOverall Patient Progress: improving    Outcome Evaluation: VSS on RA. Afebrile. Ambulating with 1A, GB, walker. Pain managed with scheduled Tylenol and PRN Oxy.    Mod carb diet. Voiding without difficulty. Hemovac drain. Discharge pending.       Problem: Adult Inpatient Plan of Care  Goal: Plan of Care Review  Description: The Plan of Care Review/Shift note should be completed every shift.  The Outcome Evaluation is a brief statement about your assessment that the patient is improving, declining, or no change.  This information will be displayed automatically on your shift  note.  Outcome: Progressing  Flowsheets (Taken 5/21/2024 0238)  Outcome Evaluation: VSS on RA. Afebrile. Ambulating with 1A, GB, walker. Pain managed with scheduled Tylenol and PRN Oxy.  Plan of Care Reviewed With: patient  Overall Patient Progress: improving  Goal: Patient-Specific Goal (Individualized)  Description: You can add care plan individualizations to a care plan. Examples of Individualization might be:  \"Parent requests to be called daily at 9am for status\", \"I have a hard time hearing out of my right ear\", or \"Do not touch me to wake me up as it startles  me\".  Outcome: Progressing  Goal: Absence of Hospital-Acquired Illness or Injury  Outcome: Progressing  Intervention: Identify and Manage Fall Risk  Recent Flowsheet Documentation  Taken 5/21/2024 0141 by Chyna Conway, RN  Safety Promotion/Fall Prevention:   activity supervised   assistive device/personal items within reach   clutter free environment maintained   nonskid shoes/slippers when out of bed   safety round/check completed  Goal: Optimal Comfort and Wellbeing  Outcome: Progressing  Intervention: Monitor Pain and Promote Comfort  Recent Flowsheet Documentation  Taken 5/21/2024 0140 by Chyna Conway, RN  Pain Management Interventions:   rest   " declines  Goal: Readiness for Transition of Care  Outcome: Progressing     Problem: Skin Injury Risk Increased  Goal: Skin Health and Integrity  Outcome: Progressing  Intervention: Optimize Skin Protection  Recent Flowsheet Documentation  Taken 5/21/2024 0140 by Chyna Conway RN  Activity Management: ambulated to bathroom     Problem: Skin or Soft Tissue Infection  Goal: Absence of Infection Signs and Symptoms  Outcome: Progressing  Intervention: Minimize and Manage Infection Progression  Recent Flowsheet Documentation  Taken 5/21/2024 0141 by Chyna Conway RN  Isolation Precautions: contact precautions maintained     Problem: Pain Acute  Goal: Optimal Pain Control and Function  Outcome: Progressing  Intervention: Develop Pain Management Plan  Recent Flowsheet Documentation  Taken 5/21/2024 0140 by Chyna Conway RN  Pain Management Interventions:   rest   declines  Intervention: Prevent or Manage Pain  Recent Flowsheet Documentation  Taken 5/21/2024 0141 by Chyna Conway RN  Medication Review/Management:   medications reviewed   high-risk medications identified

## 2024-05-21 NOTE — TELEPHONE ENCOUNTER
Appt with you 2/5/21    Routing refill request to provider for review/approval because:  Labs not current:  A1C  Patient needs to be seen because:  Due for diabetic visit    Daisy Bradley RN          
I called and left patient a voicemail to call back or check his MyChart for the below information.    Grace AGUIRRE CMA    
Please reach out to Caio and let him know I am retiring the end of January.  Can you assist him in scheduling a follow up with Dr. Hinkle or Bekah.  Let him know I refilled his glipizide.  Diana Butler NP  Endocrinology    
DASH Diet/Consistent Carbohydrate Diabetic Diets

## 2024-05-21 NOTE — PLAN OF CARE
"Goal Outcome Evaluation:      Plan of Care Reviewed With: patient    Overall Patient Progress: improvingOverall Patient Progress: improving       Care Continued from 0700-19:00    Events this shift    Orientation   BP (!) 155/74 (BP Location: Left arm)   Pulse 61   Temp 96.8  F (36  C) (Temporal)   Resp 16   Ht 1.651 m (5' 5\")   Wt 113.4 kg (250 lb)   SpO2 98%   BMI 41.60 kg/m    O2: RA  B   Pain: Minimal pain noted this shift  Ambulation: A1 wgb  Skin: Small amount of drainage on R knee aqua cell   Cms: Baseline neuropathy   Gtts: SL      Plan:  Discharge home possibly tomorrow with homecare. Intermittent baseline tremors, slightly unsteady when walking. Currently on IV Ancef infused at 1224. PRN atarax given at 0812 with oxycodone 5mg. Pt still complains of constipation, bowel sounds active, pt had prn Magnesium hydroxide at 0604, and scheduled miralax and senna.       Problem: Adult Inpatient Plan of Care  Goal: Plan of Care Review  Description: The Plan of Care Review/Shift note should be completed every shift.  The Outcome Evaluation is a brief statement about your assessment that the patient is improving, declining, or no change.  This information will be displayed automatically on your shift  note.  Outcome: Progressing  Flowsheets (Taken 2024 173)  Plan of Care Reviewed With: patient  Overall Patient Progress: improving  Goal: Patient-Specific Goal (Individualized)  Description: You can add care plan individualizations to a care plan. Examples of Individualization might be:  \"Parent requests to be called daily at 9am for status\", \"I have a hard time hearing out of my right ear\", or \"Do not touch me to wake me up as it startles  me\".  Outcome: Progressing  Goal: Absence of Hospital-Acquired Illness or Injury  Outcome: Progressing  Intervention: Identify and Manage Fall Risk  Recent Flowsheet Documentation  Taken 2024 0904 by Lorenzo Pedraza RN  Safety Promotion/Fall Prevention:   " activity supervised   assistive device/personal items within reach   clutter free environment maintained   nonskid shoes/slippers when out of bed   safety round/check completed  Intervention: Prevent Skin Injury  Recent Flowsheet Documentation  Taken 5/21/2024 0955 by Lorenzo Pedraza RN  Body Position: position changed independently  Taken 5/21/2024 0904 by Lorenzo Pedraza RN  Skin Protection:   adhesive use limited   incontinence pads utilized  Device Skin Pressure Protection: absorbent pad utilized/changed  Intervention: Prevent and Manage VTE (Venous Thromboembolism) Risk  Recent Flowsheet Documentation  Taken 5/21/2024 0904 by Lorenzo Pedraza RN  VTE Prevention/Management: SCDs (sequential compression devices) off  Intervention: Prevent Infection  Recent Flowsheet Documentation  Taken 5/21/2024 0904 by Lorenzo Pedraza RN  Infection Prevention: rest/sleep promoted  Goal: Optimal Comfort and Wellbeing  Outcome: Progressing  Intervention: Monitor Pain and Promote Comfort  Recent Flowsheet Documentation  Taken 5/21/2024 0805 by Lorenzo Pedraza RN  Pain Management Interventions: medication (see MAR)  Goal: Readiness for Transition of Care  Outcome: Progressing

## 2024-05-21 NOTE — PROGRESS NOTES
Orthopedic Surgery  Steve Morgan  05/21/2024     Admit Date:  5/17/2024    POD: 1 Day Post-Op   Procedure(s):  Excisional prepatellar bursectomy, right knee     Patient resting comfortably in chair.    Pain controlled.  Tolerating oral intake.    Denies nausea or vomiting  Denies chest pain or shortness of breath    Temp:  [96.7  F (35.9  C)-97.2  F (36.2  C)] 96.7  F (35.9  C)  Pulse:  [59-87] 64  Resp:  [7-20] 16  BP: ()/(59-78) 139/69  SpO2:  [92 %-100 %] 97 %    Alert and oriented  Dressing is quite saturated.  There is oozing from the anterior incision site. Dressing was changed at bedside today.   Hemovac drain remains in place and new tegaderm applied.   Minimal erythema of the surrounding skin.   Bilateral calves are soft, non-tender.  Right lower extremity is NVI.  Sensation intact bilateral lower extremities  Patient able to resist dorsi and plantar flexion bilaterally  +Dp pulse    Labs:  Recent Labs   Lab Test 05/21/24  0658 05/20/24  0720 05/18/24  0545 05/17/24  1814   WBC  --  13.3* 13.4* 15.7*   HGB 8.5* 9.4* 9.0* 10.5*   PLT  --  274 241 291     No lab results found.  Recent Labs   Lab Test 05/17/24  1814 12/29/22  0551 12/28/22  0537   CRPI 254.74* 8.14* 26.99*       1. PLAN:   Continue ASA 81mg bid for DVT prophylaxis.     Mobilize with PT/OT    WBAT right LE.     Continue current pain regiment.   Dressings: Keep intact.  Change if >60% saturated or peeling off. Will change to adaptic, gauze and ABD if continue to saturate the aquacel.    Follow-up: 2 weeks post-op with Dr Bo team    2. Disposition   Anticipate d/c to home when medically cleared and progressing in PT.    Nilam Robin PA-C    Addendum: Staff reported patient's drain accidentally pulled out when patient was getting dressed following exam.

## 2024-05-22 ENCOUNTER — APPOINTMENT (OUTPATIENT)
Dept: PHYSICAL THERAPY | Facility: CLINIC | Age: 64
DRG: 501 | End: 2024-05-22
Payer: MEDICARE

## 2024-05-22 ENCOUNTER — APPOINTMENT (OUTPATIENT)
Dept: ULTRASOUND IMAGING | Facility: CLINIC | Age: 64
DRG: 501 | End: 2024-05-22
Attending: PHYSICIAN ASSISTANT
Payer: MEDICARE

## 2024-05-22 VITALS
WEIGHT: 250 LBS | TEMPERATURE: 98.6 F | OXYGEN SATURATION: 99 % | BODY MASS INDEX: 41.65 KG/M2 | HEART RATE: 63 BPM | HEIGHT: 65 IN | RESPIRATION RATE: 20 BRPM | DIASTOLIC BLOOD PRESSURE: 81 MMHG | SYSTOLIC BLOOD PRESSURE: 170 MMHG

## 2024-05-22 LAB
BACTERIA BLD CULT: NO GROWTH
BACTERIA BLD CULT: NO GROWTH
BACTERIA SNV CULT: ABNORMAL
BACTERIA TISS BX CULT: ABNORMAL
CREAT SERPL-MCNC: 1.61 MG/DL (ref 0.67–1.17)
EGFRCR SERPLBLD CKD-EPI 2021: 47 ML/MIN/1.73M2
GLUCOSE BLDC GLUCOMTR-MCNC: 146 MG/DL (ref 70–99)
GLUCOSE BLDC GLUCOMTR-MCNC: 209 MG/DL (ref 70–99)
GLUCOSE BLDC GLUCOMTR-MCNC: 239 MG/DL (ref 70–99)
GLUCOSE SERPL-MCNC: 180 MG/DL (ref 70–99)
HGB BLD-MCNC: 9.6 G/DL (ref 13.3–17.7)

## 2024-05-22 PROCEDURE — 82947 ASSAY GLUCOSE BLOOD QUANT: CPT | Performed by: ORTHOPAEDIC SURGERY

## 2024-05-22 PROCEDURE — 250N000013 HC RX MED GY IP 250 OP 250 PS 637: Performed by: INTERNAL MEDICINE

## 2024-05-22 PROCEDURE — 99232 SBSQ HOSP IP/OBS MODERATE 35: CPT | Performed by: SPECIALIST

## 2024-05-22 PROCEDURE — 250N000013 HC RX MED GY IP 250 OP 250 PS 637

## 2024-05-22 PROCEDURE — 82565 ASSAY OF CREATININE: CPT | Performed by: INTERNAL MEDICINE

## 2024-05-22 PROCEDURE — 85018 HEMOGLOBIN: CPT

## 2024-05-22 PROCEDURE — 36415 COLL VENOUS BLD VENIPUNCTURE: CPT | Performed by: INTERNAL MEDICINE

## 2024-05-22 PROCEDURE — 250N000011 HC RX IP 250 OP 636: Performed by: SPECIALIST

## 2024-05-22 PROCEDURE — 97530 THERAPEUTIC ACTIVITIES: CPT | Mod: GP

## 2024-05-22 PROCEDURE — 93971 EXTREMITY STUDY: CPT | Mod: RT

## 2024-05-22 PROCEDURE — 99239 HOSP IP/OBS DSCHRG MGMT >30: CPT | Performed by: INTERNAL MEDICINE

## 2024-05-22 RX ORDER — CEFADROXIL 500 MG/1
500 CAPSULE ORAL 2 TIMES DAILY
Qty: 20 CAPSULE | Refills: 0 | Status: SHIPPED | OUTPATIENT
Start: 2024-05-22 | End: 2024-06-01

## 2024-05-22 RX ORDER — GLIPIZIDE 10 MG/1
10 TABLET, FILM COATED, EXTENDED RELEASE ORAL DAILY
Status: DISCONTINUED | OUTPATIENT
Start: 2024-05-22 | End: 2024-05-22 | Stop reason: HOSPADM

## 2024-05-22 RX ADMIN — SIMVASTATIN 20 MG: 10 TABLET, FILM COATED ORAL at 09:05

## 2024-05-22 RX ADMIN — OXYCODONE HYDROCHLORIDE 5 MG: 5 TABLET ORAL at 00:59

## 2024-05-22 RX ADMIN — POLYETHYLENE GLYCOL 3350 17 G: 17 POWDER, FOR SOLUTION ORAL at 09:04

## 2024-05-22 RX ADMIN — LOSARTAN POTASSIUM 100 MG: 100 TABLET, FILM COATED ORAL at 09:05

## 2024-05-22 RX ADMIN — BUPROPION HYDROCHLORIDE 150 MG: 150 TABLET, EXTENDED RELEASE ORAL at 09:05

## 2024-05-22 RX ADMIN — FERROUS SULFATE TAB 325 MG (65 MG ELEMENTAL FE) 325 MG: 325 (65 FE) TAB at 09:05

## 2024-05-22 RX ADMIN — ATENOLOL 25 MG: 25 TABLET ORAL at 09:05

## 2024-05-22 RX ADMIN — GLIPIZIDE 10 MG: 10 TABLET, EXTENDED RELEASE ORAL at 11:45

## 2024-05-22 RX ADMIN — ACETAMINOPHEN 975 MG: 325 TABLET, FILM COATED ORAL at 13:29

## 2024-05-22 RX ADMIN — CEFAZOLIN SODIUM 2 G: 2 INJECTION, SOLUTION INTRAVENOUS at 13:29

## 2024-05-22 RX ADMIN — ASPIRIN 81 MG: 81 TABLET, COATED ORAL at 09:05

## 2024-05-22 RX ADMIN — SENNOSIDES AND DOCUSATE SODIUM 1 TABLET: 50; 8.6 TABLET ORAL at 09:05

## 2024-05-22 RX ADMIN — CEFAZOLIN SODIUM 2 G: 2 INJECTION, SOLUTION INTRAVENOUS at 04:09

## 2024-05-22 RX ADMIN — LEVOTHYROXINE SODIUM 137 MCG: 0.11 TABLET ORAL at 09:05

## 2024-05-22 RX ADMIN — ACETAMINOPHEN 975 MG: 325 TABLET, FILM COATED ORAL at 05:54

## 2024-05-22 ASSESSMENT — ACTIVITIES OF DAILY LIVING (ADL)
ADLS_ACUITY_SCORE: 35

## 2024-05-22 NOTE — PROGRESS NOTES
"Orthopedic Surgery  Steve Morgan  05/22/2024     Admit Date:  5/17/2024  POD: 2 Days Post-Op   Procedure(s):  Excisional prepatellar bursectomy, right knee     Patient resting comfortably in chair.    Pain controlled.  He is reporting some increased calf pain and \"pulling\" today.   Tolerating oral intake.    Denies nausea or vomiting  Denies chest pain or shortness of breath    Temp:  [96.8  F (36  C)-98.6  F (37  C)] 98.6  F (37  C)  Pulse:  [61-71] 63  Resp:  [16-20] 20  BP: (141-170)/(59-81) 170/81  SpO2:  [96 %-99 %] 99 %    Alert and oriented  Dressing is intact with mild superior peeling and dollar sized drainage. Superior dressing reinforced with tegaderm.  Minimal erythema of the surrounding skin.   Bilateral calves are soft, minimally tender but patient notes pulling sensation in the medial/posterior calf.    Right lower extremity is NVI.  Sensation intact bilateral lower extremities  Patient able to resist dorsi and plantar flexion bilaterally  +Dp pulse    Labs:  Recent Labs   Lab Test 05/22/24  0614 05/21/24  0658 05/20/24  0720 05/18/24  0545 05/17/24  1814   WBC  --   --  13.3* 13.4* 15.7*   HGB 9.6* 8.5* 9.4* 9.0* 10.5*   PLT  --   --  274 241 291     No lab results found.  Recent Labs   Lab Test 05/17/24  1814 12/29/22  0551 12/28/22  0537   CRPI 254.74* 8.14* 26.99*       1. PLAN:   Continue ASA 81mg bid for DVT prophylaxis.     Will order an US to eval new calf pain.     Abx per ID    Mobilize with PT/OT    WBAT right LE.     Continue current pain regiment.   Dressings: Keep intact.  Change if >60% saturated or peeling off.    Follow-up: 2 weeks post-op with Dr Bo team    2. Disposition   Anticipate d/c to home later today pending US results     Nilam Robin PA-C      "

## 2024-05-22 NOTE — PLAN OF CARE
"Goal Outcome Evaluation:         Patient discharged to home with home cares. US neg for DVT. Meds sent with. No further questions.  Problem: Adult Inpatient Plan of Care  Goal: Plan of Care Review  Description: The Plan of Care Review/Shift note should be completed every shift.  The Outcome Evaluation is a brief statement about your assessment that the patient is improving, declining, or no change.  This information will be displayed automatically on your shift  note.  Outcome: Met  Goal: Patient-Specific Goal (Individualized)  Description: You can add care plan individualizations to a care plan. Examples of Individualization might be:  \"Parent requests to be called daily at 9am for status\", \"I have a hard time hearing out of my right ear\", or \"Do not touch me to wake me up as it startles  me\".  Outcome: Met  Goal: Absence of Hospital-Acquired Illness or Injury  Outcome: Met  Intervention: Identify and Manage Fall Risk  Recent Flowsheet Documentation  Taken 5/22/2024 0900 by Ricardo Chen RN  Safety Promotion/Fall Prevention:   activity supervised   assistive device/personal items within reach   safety round/check completed   nonskid shoes/slippers when out of bed   lighting adjusted   patient and family education  Intervention: Prevent and Manage VTE (Venous Thromboembolism) Risk  Recent Flowsheet Documentation  Taken 5/22/2024 0900 by Ricardo Chen RN  VTE Prevention/Management: SCDs (sequential compression devices) off  Goal: Optimal Comfort and Wellbeing  Outcome: Met  Intervention: Monitor Pain and Promote Comfort  Recent Flowsheet Documentation  Taken 5/22/2024 0733 by Ricardo Chen RN  Pain Management Interventions: repositioned  Goal: Readiness for Transition of Care  Outcome: Met     Problem: Skin Injury Risk Increased  Goal: Skin Health and Integrity  Outcome: Met     Problem: Skin or Soft Tissue Infection  Goal: Absence of Infection Signs and Symptoms  Outcome: Met  Intervention: " Minimize and Manage Infection Progression  Recent Flowsheet Documentation  Taken 5/22/2024 0900 by Ricardo Chen RN  Isolation Precautions: contact precautions maintained     Problem: Pain Acute  Goal: Optimal Pain Control and Function  Outcome: Met  Intervention: Develop Pain Management Plan  Recent Flowsheet Documentation  Taken 5/22/2024 0733 by Ricardo Chen RN  Pain Management Interventions: repositioned  Intervention: Prevent or Manage Pain  Recent Flowsheet Documentation  Taken 5/22/2024 0900 by Ricardo Chen RN  Medication Review/Management:   medications reviewed   high-risk medications identified

## 2024-05-22 NOTE — DISCHARGE SUMMARY
"[facility]  Hospitalist Discharge Summary      Date of Admission:  5/17/2024  Date of Discharge:  5/22/2024  Discharging Provider: Maxwell Cantrell MD, MD  Discharge Service: Hospitalist Service    Discharge Diagnoses   Prepatellar bursitis right with isolated MSSA  Postoperative state status post excision of prepatellar bursectomy  Insulin requiring diabetes mellitus  CKD  Obesity  Stable chronic hyponatremia  Benign essential hypertension  Dyslipidemia  Stable anemia of chronic illness    Clinically Significant Risk Factors     # DMII: A1C = 8.0 % (Ref range: 0.0 - 5.6 %) within past 6 months  # Severe Obesity: Estimated body mass index is 41.6 kg/m  as calculated from the following:    Height as of this encounter: 1.651 m (5' 5\").    Weight as of this encounter: 113.4 kg (250 lb).       Follow-ups Needed After Discharge   Follow-up Appointments     Follow-up and recommended labs and tests       Follow-up with Dr. Bo team at [facility] approximately 2   weeks following your surgery.  Call the care coordinator at [phone]   to arrange appointment or if any questions.    [facility] clinic numbers:  Dam[place] [phone], [place] [phone]  Walk in clinic hours: 8 am - 8 pm            Unresulted Labs Ordered in the Past 30 Days of this Admission       Date and Time Order Name Status Description    5/20/2024 10:25 AM Anaerobic Bacterial Culture Routine Preliminary     5/20/2024 10:25 AM Anaerobic Bacterial Culture Routine Preliminary     5/17/2024  7:47 PM Blood Culture Arm, Left Preliminary     5/17/2024  7:47 PM Blood Culture Peripheral Blood Preliminary         These results will be followed up by primary care physician    Discharge Disposition   Discharged to home  Condition at discharge: Stable    Hospital Course       Continuing medicine service care today.  Seen and examined.  Chart reviewed.  Reviewed subspecialty notes and recommendations from ID and orthopedic " service.  Optimized from all services.  No growth on blood cultures.  Cultures from bursa did reveal MSSA and will be continued on oral antibiotics upon discharge as per ID recommendations to finish 10-day course.  While he is continuously improving.  Continues to demonstrate stable hemodynamics.  Denies any nausea or vomiting.  No reported diarrhea or any bleeding tendencies.  Not requiring oxygen support.  Remained afebrile.  He is hoping and wanting for hospital discharge as he is currently.  Will proceed with planned hospital discharge later today.  No clear evidence of DVT with this pending lower extremity right DVT study.  I will refer you to excerpts of prior progress notes as listed below for other details of her hospitalization stay:    64M with hx of IDDM Type II, CKD Stage III, morbid obesity BMI 41, and hypertension presents with right knee pain and swelling. On adm, leukocytosis but no other signs of sepsis. XR of right knee with evidence of prepatellar swelling but no joint effusion.  Patellar aspiration performed with 5 mL cloudy fluid removed and sent for analysis   Aspirate is not demonstrating gram-positive cocci and patient has a history of MRSA.    1/.  Prepatellar septic bursitis with isolated Staph aureus  -Prior history of MRSA infection    -Remain on IV antibiotics currently on vancomycin  -Currently afebrile  -Blood cultures remain no growth up-to-date  -No significant worsening uncontrolled pain  -Appreciate continuous input from orthopedic service.  Plans for I&D this morning  -Requested formal ID service input   -Monitor infectious markers    2/.  Type 2 diabetes mellitus probably ongoing for 15 to 20 years with multiple endorgan issues including peripheral neuropathy and CKD.  Resume several of the home medications continue with Accu-Cheks and sliding scale insulin.  -Modified basal Lantus dosing today given current n.p.o. status with planned surgery.  He shared with me that he usually  takes 40 units once a day at nighttime of Lantus  -Administer 25 units Lantus today  -Resume home regimen of Lantus starting tomorrow 40 units  -Hold his metformin given n.p.o. status  -Creatinine remained stable at 1.6    3/.  Peripheral neuropathy significant in nature, falls precautions to be instituted discussed with patient and bedside nursing stating that he does need to use his walker and ask for help when he is trying to move around.    4/.  Other medical issues including dyslipidemia hypothyroid state: Resume prior to admission medicines  Continue inpatient care    5.  Chronic hyponatremia/likely has pseudohyponatremia component with uncontrolled hyperglycemia  -Stable hyponatremia    Consultations This Hospital Stay   PHARMACY TO DOSE VANCO  PHARMACY TO DOSE VANCO  PHYSICAL THERAPY ADULT IP CONSULT  ORTHOPEDIC SURGERY IP CONSULT  INFECTIOUS DISEASES IP CONSULT  PHYSICAL THERAPY ADULT IP CONSULT  OCCUPATIONAL THERAPY ADULT IP CONSULT  INFECTIOUS DISEASES IP CONSULT  NURSING TO CONSULT FOR VASCULAR ACCESS CARE IP CONSULT  CARE MANAGEMENT / SOCIAL WORK IP CONSULT  CARE MANAGEMENT / SOCIAL WORK IP CONSULT    Code Status   Full Code    Time Spent on this Encounter   I, Maxwell Cantrell MD, MD, personally saw the patient today and spent greater than 30 minutes discharging this patient.       Maxwell Cantrell MD, MD  [facility]  [address] [facility] [address]  Phone: [phone]  Fax: [phone]  ______________________________________________________________________    Physical Exam   Vital Signs: Temp: 98.6  F (37  C) Temp src: Axillary BP: (!) 170/81 Pulse: 63   Resp: 20 SpO2: 99 % O2 Device: None (Room air)    Weight: 250 lbs 0 oz  HEENT; Atraumatic, normocephalic, pinkish conjuctiva, pupils bilateral reactive   Skin: warm and moist, no rashes  Lymphatics: no cervical or axillary lymphandenopathy  Lungs: equal chest expansion, clear to auscultation, no  wheezes, no stridor, no crackles,   Heart: normal rate, normal rhythm, no rubs or gallops.   Abdomen: normal bowel sounds, no tenderness, no peritoneal signs, no guarding  Extremities: no deformities, no edema   Minimal swelling right knee, dressing in place  Neuro; follow commands, alert and oriented x3, spontaneous speech, coherent, moves all extremities spontaneously  Psych; no hallucination, euthymic mood, not agitated         Primary Care Physician   Lisa Pierre    Discharge Orders      Reason for your hospital stay    Right knee bursa I&D     Follow-up and recommended labs and tests     Follow-up with Dr. Bo team at [facility] approximately 2 weeks following your surgery.  Call the care coordinator at [phone] to arrange appointment or if any questions.    [facility] clinic numbers:  Damari [phone], [place] [phone]  Walk in clinic hours: 8 am - 8 pm     Activity    Your activity upon discharge: WBAT right LE with gait aide if needed     When to contact your care team    Call if increasing pain, redness, drainage, cramping, fever >101.     Wound care and dressings    Instructions to care for your wound at home: Site: Right knee   Keep dressings clean, dry and intact.  Okay to shower over aquacel and tegaderm dressings.  Do not immerse     Crutches DME    DME Documentation: Describe the reason for need to support medical necessity: Impaired gait status post knee surgery. I, the undersigned, certify that the above prescribed supplies are medically necessary for this patient and is both reasonable and necessary in reference to accepted standards of medical practice in the treatment of this patient's condition and is not prescribed as a convenience.     Cane DME    DME Documentation: Describe the reason for need to support medical necessity: Impaired gait status post knee surgery. I, the undersigned, certify that the above prescribed supplies are medically necessary for this patient  and is both reasonable and necessary in reference to accepted standards of medical practice in the treatment of this patient's condition and is not prescribed as a convenience.     Walker DME    DME Documentation: Describe the reason for need to support medical necessity: Impaired gait status post knee surgery. I, the undersigned, certify that the above prescribed supplies are medically necessary for this patient and is both reasonable and necessary in reference to accepted standards of medical practice in the treatment of this patient's condition and is not prescribed as a convenience.     Diet    Follow this diet upon discharge: Orders Placed This Encounter      Advance Diet as Tolerated: Regular Diet Adult         Significant Results and Procedures   Most Recent 3 CBC's:  Recent Labs   Lab Test 05/22/24  0614 05/21/24  0658 05/20/24  0720 05/18/24  0545 05/17/24 1814   WBC  --   --  13.3* 13.4* 15.7*   HGB 9.6* 8.5* 9.4* 9.0* 10.5*   MCV  --   --  88 89 88   PLT  --   --  274 241 291     Most Recent 3 BMP's:  Recent Labs   Lab Test 05/22/24  0740 05/22/24  0614 05/22/24  0222 05/21/24  0740 05/21/24  0658 05/20/24  0919 05/20/24  0720 05/18/24  0742 05/18/24  0545 05/17/24 2057 05/17/24 1814   NA  --   --   --   --   --   --  128*  --  130*  --  131*   POTASSIUM  --   --   --   --   --   --  4.4  --  4.3  --  4.5   CHLORIDE  --   --   --   --   --   --  94*  --  98  --  95*   CO2  --   --   --   --   --   --  22  --  20*  --  21*   BUN  --   --   --   --   --   --  21.0  --  23.6*  --  24.3*   CR  --  1.61*  --   --  1.52*  --  1.62*   < > 1.80*  --  1.74*   ANIONGAP  --   --   --   --   --   --  12  --  12  --  15   CHAR  --   --   --   --   --   --  8.8  --  8.5*  --  9.7   * 180* 209*   < >  --    < > 190*   < > 236*   < > 234*    < > = values in this interval not displayed.     Most Recent 2 LFT's:  Recent Labs   Lab Test 05/14/24  1303 12/05/23  1257   AST 25 22   ALT 27 21   ALKPHOS 72 68    BILITOTAL 0.2 0.2     Most Recent 3 INR's:No lab results found.  7-Day Micro Results       Collected Updated Procedure Result Status      05/20/2024 1025 05/21/2024 1432 Anaerobic Bacterial Culture Routine [[id number]]   Tissue from Knee, Right    Preliminary result Component Value   Culture No anaerobic organisms isolated after 1 day  [P]                05/20/2024 1025 05/20/2024 1509 Gram Stain [[id number]]   (Abnormal)   Tissue from Knee, Right    Final result Component Value   Culture See corresponding culture for results   Gram Stain Result 3+ Gram positive cocci   Gram Stain Result 4+ WBC seen   Predominantly PMNs            05/20/2024 1025 05/22/2024 0849 Tissue Aerobic Bacterial Culture Routine [[id number]]   (Abnormal)   Tissue from Knee, Right    Final result Component Value   Culture 4+ Staphylococcus aureus    Susceptibilities done on previous cultures               05/20/2024 1021 05/21/2024 1501 Anaerobic Bacterial Culture Routine [[id number]]   Synovial fluid from Knee, Right    Preliminary result Component Value   Culture No anaerobic organisms isolated after 1 day  [P]                05/20/2024 1021 05/22/2024 0829 Synovial fluid Aerobic Bacterial Culture Routine [[id number]]   (Abnormal)   Synovial fluid from Knee, Right    Final result Component Value   Culture 4+ Staphylococcus aureus    Susceptibilities done on previous cultures               05/17/2024 2253 05/20/2024 0749 Aspirate Aerobic Bacterial Culture Routine With Gram Stain [[id number]]    (Abnormal)   Aspirate from Synovial Bursa    Final result Component Value   Culture 4+ Staphylococcus aureus   Gram Stain Result 1+ Gram positive cocci    2+ WBC seen    Predominantly PMNs        Susceptibility        Staphylococcus aureus      JACKIE      Clindamycin 0.25 ug/mL Susceptible      Daptomycin 0.25 ug/mL Susceptible      Doxycycline <=0.5 ug/mL Susceptible      Erythromycin <=0.25 ug/mL Susceptible      Gentamicin <=0.5 ug/mL  Susceptible      Oxacillin 0.5 ug/mL Susceptible  [1]       Tetracycline <=1 ug/mL Susceptible      Trimethoprim/Sulfamethoxazole <=0.5/9.5 ug/mL Susceptible      Vancomycin 1 ug/mL Susceptible                   [1]  Oxacillin susceptible isolates are susceptible to cephalosporins (example: cefazolin and cephalexin) and beta lactam combination agents. Oxacillin resistant isolates are resistant to these agents.                    05/17/2024 2253 05/18/2024 0035 Cell count with differential fluid [[id number]]    (Abnormal)   Synovial fluid from Synovial Bursa    Final result Component Value   No component results            05/17/2024 2253 05/18/2024 0035 Cell Count Body Fluid [[id number]]    (Abnormal)   Synovial fluid from Synovial Bursa    Final result Component Value Units   Color Yellow    Clarity Cloudy    Cell Count Fluid Source Knee, Right    Total Nucleated Cells 44,895 /uL            05/17/2024 2253 05/18/2024 0035 Differential Body Fluid [[id number]]    Synovial fluid from Synovial Bursa    Final result Component Value Units   % Neutrophils 90 %   % Lymphocytes 0 %   % Monocyte/Macrophages 10 %            05/17/2024 2022 05/21/2024 2331 Blood Culture Arm, Left [[id number]]   Blood from Arm, Left    Preliminary result Component Value   Culture No growth after 4 days  [P]                05/17/2024 1953 05/21/2024 2331 Blood Culture Peripheral Blood [[id number]]   Peripheral Blood    Preliminary result Component Value   Culture No growth after 4 days  [P]                      Most Recent Hemoglobin A1c:  Recent Labs   Lab Test 05/14/24  1303   A1C 8.0*   ,   Results for orders placed or performed during the hospital encounter of 05/17/24   XR Knee Right 3 Views    Narrative    EXAM: XR KNEE RIGHT 3 VIEWS  LOCATION: [facility]  DATE: 5/17/2024    INDICATION: pain, swelling  COMPARISON: 2/1/2024      Impression    IMPRESSION: Normal joint spaces and alignment. No fracture or  "joint effusion. Prepatellar soft tissue edema or fluid collection which may reflect bursitis.     *Note: Due to a large number of results and/or encounters for the requested time period, some results have not been displayed. A complete set of results can be found in Results Review.       Discharge Medications   Current Discharge Medication List        START taking these medications    Details   aspirin 81 MG EC tablet Take 1 tablet (81 mg) by mouth 2 times daily  Qty: 60 tablet, Refills: 0    Associated Diagnoses: Septic prepatellar bursitis, right      cefadroxil (DURICEF) 500 MG capsule Take 1 capsule (500 mg) by mouth 2 times daily for 10 days  Qty: 20 capsule, Refills: 0    Associated Diagnoses: Septic prepatellar bursitis, right      oxyCODONE (ROXICODONE) 5 MG tablet Take 1-2 tablets (5-10 mg) by mouth every 4 hours as needed for moderate to severe pain 1 tab po q 4 hrs prn pain scale \"3-6\"  2 tabs po q 4 hrs prn pain scale \"7-10\"  Qty: 20 tablet, Refills: 0    Associated Diagnoses: Septic prepatellar bursitis, right      senna-docusate (SENOKOT-S/PERICOLACE) 8.6-50 MG tablet Take 1 tablet by mouth 2 times daily as needed for constipation  Qty: 20 tablet, Refills: 0    Associated Diagnoses: Septic prepatellar bursitis, right           CONTINUE these medications which have CHANGED    Details   acetaminophen (TYLENOL) 325 MG tablet Take 2 tablets (650 mg) by mouth every 4 hours as needed for other (For optimal non-opioid multimodal pain management to improve pain control.)    Associated Diagnoses: Septic prepatellar bursitis, right           CONTINUE these medications which have NOT CHANGED    Details   atenolol (TENORMIN) 25 MG tablet TAKE 1 TABLET BY MOUTH EVERY DAY  Qty: 90 tablet, Refills: 1    Associated Diagnoses: Essential hypertension with goal blood pressure less than 140/90      buPROPion (WELLBUTRIN XL) 150 MG 24 hr tablet Take 1 tablet (150 mg) by mouth every morning  Qty: 90 tablet, Refills: 1    " Associated Diagnoses: Major depressive disorder, recurrent episode, moderate (H)      Calcium Carb-Cholecalciferol (CALCIUM 600 + D PO) Take 1 tablet by mouth daily      Continuous Blood Gluc  (FREESTYLE GIULIANA 2 READER) SIENA 1 Device continuous  Qty: 1 each, Refills: 0    Associated Diagnoses: Type 2 diabetes mellitus with diabetic neuropathy, with long-term current use of insulin (H)      Continuous Blood Gluc Sensor (FREESTYLE GIULIANA 2 SENSOR) MISC 1 each every 14 days Use 1 sensor every 14 days. Use to read blood sugars per 's instructions.  Qty: 2 each, Refills: 11    Associated Diagnoses: Type 2 diabetes mellitus with diabetic neuropathy, with long-term current use of insulin (H)      famotidine (PEPCID) 40 MG tablet TAKE 1 TABLET BY MOUTH EVERY DAY  Qty: 90 tablet, Refills: 1    Associated Diagnoses: Gastroesophageal reflux disease without esophagitis      ferrous sulfate 325 (65 FE) MG tablet Take 1 tablet by mouth daily (with breakfast).      finasteride (PROSCAR) 5 MG tablet Take 5 mg by mouth at bedtime      glipiZIDE (GLUCOTROL XL) 10 MG 24 hr tablet TAKE 1 TABLET (10 MG) BY MOUTH DAILY.  Qty: 90 tablet, Refills: 7    Associated Diagnoses: Type 2 diabetes mellitus with diabetic neuropathy, with long-term current use of insulin (H)      insulin glargine 100 UNIT/ML pen Inject 40 Units Subcutaneous at bedtime      insulin pen needle (B-D U/F) 31G X 5 MM miscellaneous USE 4 DAILY OR AS DIRECTED.  Qty: 400 each, Refills: 0    Associated Diagnoses: Hyperglycemia; Type 2 diabetes mellitus with diabetic neuropathy, without long-term current use of insulin (H)      latanoprost (XALATAN) 0.005 % ophthalmic solution INSTILL 1 DROP INTO BOTH EYES IN THE EVENING      LEVOXYL 137 MCG tablet TAKE 1 TABLET BY MOUTH EVERY DAY  Qty: 30 tablet, Refills: 0    Associated Diagnoses: Hypothyroidism due to acquired atrophy of thyroid      losartan (COZAAR) 100 MG tablet Take 1 tablet (100 mg) by mouth  daily  Qty: 90 tablet, Refills: 1    Associated Diagnoses: Essential hypertension with goal blood pressure less than 140/90      metFORMIN (GLUCOPHAGE) 1000 MG tablet TAKE 1 TABLET BY MOUTH TWICE A DAY WITH MEALS  Qty: 180 tablet, Refills: 0    Associated Diagnoses: Hyperglycemia; Type 2 diabetes mellitus with diabetic neuropathy, without long-term current use of insulin (H)      !! mirtazapine (REMERON) 15 MG tablet Take 1 tablet (15 mg) by mouth at bedtime  Qty: 30 tablet, Refills: 2    Associated Diagnoses: Major depressive disorder, recurrent episode, moderate (H)      !! mirtazapine (REMERON) 45 MG tablet Take 1 tablet (45 mg) by mouth at bedtime Take along with a 15 mg tablet for a total of 60 mg nightly  Qty: 30 tablet, Refills: 2    Associated Diagnoses: Major depressive disorder, recurrent episode, moderate (H)      MULTI-VITAMIN OR TABS Take 1 tablet by mouth daily  Qty: 30, Refills: 0      pantoprazole (PROTONIX) 40 MG EC tablet Take 1 tablet (40 mg) by mouth daily as needed for heartburn  Qty: 90 tablet, Refills: 1    Associated Diagnoses: Gastroesophageal reflux disease without esophagitis      simvastatin (ZOCOR) 20 MG tablet Take 20 mg by mouth daily      gabapentin (NEURONTIN) 300 MG capsule Take 1 capsule (300 mg) by mouth 3 times daily for 5 days  Qty: 15 capsule, Refills: 0       !! - Potential duplicate medications found. Please discuss with provider.        STOP taking these medications       ASPIRIN 81 MG OR TABS Comments:   Reason for Stopping:             Allergies   No Known Allergies

## 2024-05-22 NOTE — PLAN OF CARE
Physical Therapy Discharge Summary     Reason for therapy discharge:    Discharged to home with home therapy, supervision from wife with stair negotiation.     Progress towards therapy goal(s). See goals on Care Plan in [facility] electronic health record for goal details.  Goals partially met.  Barriers to achieving goals:   discharge from facility.     Therapy recommendation(s):    Continued therapy is recommended.  Rationale/Recommendations:  Patient would benefit from home PT in order to increase strength, activity tolerance and independence with mobility.  Patient requires home PT at this time as attending PT in a clinic setting would be a considerable and significantly taxing effort, limiting his ability to participate in therapy session.    Recommended use of AFOs with all ambulation with walker, supervision from wife with stair negotiation

## 2024-05-22 NOTE — PLAN OF CARE
"Goal Outcome Evaluation:      Plan of Care Reviewed With: patient    Overall Patient Progress: improvingOverall Patient Progress: improving    Outcome Evaluation: VSS on RA. Afebrile. Continues on IV abx's. Pain managed with Tylenol, PRN Atarax and Oxy.    A&O x 4. Up with 1A, GB, FWW. Voiding without difficulty. Lg BM. Plan to discharge home with HH.       Problem: Adult Inpatient Plan of Care  Goal: Plan of Care Review  Description: The Plan of Care Review/Shift note should be completed every shift.  The Outcome Evaluation is a brief statement about your assessment that the patient is improving, declining, or no change.  This information will be displayed automatically on your shift  note.  Outcome: Progressing  Flowsheets (Taken 5/22/2024 0257)  Outcome Evaluation: VSS on RA. Afebrile. Continues on IV abx's. Pain managed with Tylenol, PRN Atarax and Oxy.  Plan of Care Reviewed With: patient  Overall Patient Progress: improving  Goal: Patient-Specific Goal (Individualized)  Description: You can add care plan individualizations to a care plan. Examples of Individualization might be:  \"Parent requests to be called daily at 9am for status\", \"I have a hard time hearing out of my right ear\", or \"Do not touch me to wake me up as it startles  me\".  Outcome: Progressing  Goal: Absence of Hospital-Acquired Illness or Injury  Outcome: Progressing  Intervention: Identify and Manage Fall Risk  Recent Flowsheet Documentation  Taken 5/21/2024 2000 by Chyna Conway, RN  Safety Promotion/Fall Prevention:   activity supervised   assistive device/personal items within reach   clutter free environment maintained   nonskid shoes/slippers when out of bed   safety round/check completed  Goal: Optimal Comfort and Wellbeing  Outcome: Progressing  Intervention: Monitor Pain and Promote Comfort  Recent Flowsheet Documentation  Taken 5/22/2024 0059 by Chyna Conway, RN  Pain Management Interventions: medication (see " MAR)  Taken 5/21/2024 2027 by Chyna Conway, RN  Pain Management Interventions: medication (see MAR)  Goal: Readiness for Transition of Care  Outcome: Progressing     Problem: Skin Injury Risk Increased  Goal: Skin Health and Integrity  Outcome: Progressing     Problem: Skin or Soft Tissue Infection  Goal: Absence of Infection Signs and Symptoms  Outcome: Progressing  Intervention: Minimize and Manage Infection Progression  Recent Flowsheet Documentation  Taken 5/21/2024 2000 by Chyna Conway RN  Isolation Precautions: contact precautions maintained     Problem: Pain Acute  Goal: Optimal Pain Control and Function  Outcome: Progressing  Intervention: Develop Pain Management Plan  Recent Flowsheet Documentation  Taken 5/22/2024 0059 by Chyna Conway RN  Pain Management Interventions: medication (see MAR)  Taken 5/21/2024 2027 by Chyna Conway RN  Pain Management Interventions: medication (see MAR)  Intervention: Prevent or Manage Pain  Recent Flowsheet Documentation  Taken 5/21/2024 2000 by Chyna Conway RN  Medication Review/Management:   medications reviewed   high-risk medications identified

## 2024-05-22 NOTE — PROGRESS NOTES
Care Management Discharge Note    Discharge Date: 05/22/2024       Discharge Disposition: Home Care, Home    Discharge Services: None    Discharge DME: None    Discharge Transportation: family or friend will provide    Private pay costs discussed: Not applicable    Education Provided on the Discharge Plan: Yes  Persons Notified of Discharge Plans: patient, spouse  Patient/Family in Agreement with the Plan: yes    Handoff Referral Completed: Yes    Additional Information:  Patient discharging home today with spouse. He will receive homecare services through [facility]. Discharge orders faxed to [facility] and liaison updated of patient discharge. Updated patient and spouse that [facility] would contact them to set up admission within the next day or two. Patient verbalizes understanding.     Carlee Esteban RN  Care Coordinator  [facility]

## 2024-05-22 NOTE — PROGRESS NOTES
[facility]    Infectious Disease Progress Note    Date of Service : 05/22/2024     Assessment:  64YM with diabetes complicated by Ckd and obesity, who has been admitted with  right knee pain and pre patellar swelling related to pre patellar bursitis related to MSSA of skin source, without evidence of septic arthritis. Aspirate of bursa grew MSSA, s/p surgical debridement with excisional prepatellar bursectomy . Operative cxs are growing Staph aureus as well.      -R prepatellar bursitis related to MSSA s/p  excisional prepatellar bursectomy, no evidence of septic arthritis  -Uncontrolled diabetes HbA1c 8%  -Hyponatremia  -CKD  -Obesity     Recommendations:  Can transition to oral Cefadroxil 500 mg PO BID for 10 days at discharge  Will need to follow with orthopedic surgery  Doppler US is planned for today  Can discharge from the ID stand point once medically stable  Discussed with the orthopedic team   ID will sign off    Elizabeth Lester MD    Interval History   Remains stable and continues to improve, tissue cxs also growing S.aureus. tolerating antibiotics without side effects. Notes right calf swelling and pain today, no tenderness    Physical Exam   Temp: 98.6  F (37  C) Temp src: Axillary BP: (!) 170/81 Pulse: 63   Resp: 20 SpO2: 99 % O2 Device: None (Room air)    Vitals:    05/17/24 1740   Weight: 113.4 kg (250 lb)     Vital Signs with Ranges  Temp:  [96.8  F (36  C)-98.6  F (37  C)] 98.6  F (37  C)  Pulse:  [61-71] 63  Resp:  [16-20] 20  BP: (141-170)/(59-81) 170/81  SpO2:  [96 %-99 %] 99 %    Constitutional: Awake, alert, cooperative, no apparent distress  Lungs: non labored breathing  Skin: No rash  MS : R knee  dressing, surrounding erythema has improved, some ongoing knee and calf swelling    Other:    Medications   Current Facility-Administered Medications   Medication Dose Route Frequency Provider Last Rate Last Admin    lactated ringers infusion   Intravenous Continuous Roerig,  ANASTASIA Mustafa   Stopped at 05/21/24 0511     Current Facility-Administered Medications   Medication Dose Route Frequency Provider Last Rate Last Admin    acetaminophen (TYLENOL) tablet 975 mg  975 mg Oral Q8H Moon Melchor PA-C   975 mg at 05/22/24 0554    aspirin EC tablet 81 mg  81 mg Oral BID Moon Melchor PA-C   81 mg at 05/22/24 0905    atenolol (TENORMIN) tablet 25 mg  25 mg Oral Daily John Coleman MD   25 mg at 05/22/24 0905    buPROPion (WELLBUTRIN XL) 24 hr tablet 150 mg  150 mg Oral QAM John Coleman MD   150 mg at 05/22/24 0905    ceFAZolin (ANCEF) 2 g in 100 mL D5W intermittent infusion  2 g Intravenous Q8H Elizabeth Lester  mL/hr at 05/22/24 0409 2 g at 05/22/24 0409    famotidine (PEPCID) tablet 20 mg  20 mg Oral Daily at 8 pm Moon Melchor PA-C   20 mg at 05/21/24 2029    Or    famotidine (PEPCID) injection 20 mg  20 mg Intravenous Daily at 8 pm Moon Melchor PA-C        ferrous sulfate (FEROSUL) tablet 325 mg  325 mg Oral Daily with breakfast Mirtha Vargas MD   325 mg at 05/22/24 0905    finasteride (PROSCAR) tablet 5 mg  5 mg Oral Daily John Coleman MD   5 mg at 05/21/24 2029    glipiZIDE (GLUCOTROL XL) 24 hr tablet 10 mg  10 mg Oral Daily Maxwell Cantrell MD        insulin aspart (NovoLOG) injection (RAPID ACTING)   Subcutaneous TID AC Maxwell Cantrell MD   5 Units at 05/22/24 0909    insulin aspart (NovoLOG) injection (RAPID ACTING)  1-10 Units Subcutaneous TID AC Maxwell Cantrell MD   1 Units at 05/22/24 0909    insulin aspart (NovoLOG) injection (RAPID ACTING)  1-7 Units Subcutaneous At Bedtime Maxwell Cantrell MD   1 Units at 05/21/24 2209    insulin glargine (LANTUS PEN) injection 40 Units  40 Units Subcutaneous QAM AC Maxwell Cantrell MD   40 Units at 05/22/24 0910    latanoprost (XALATAN) 0.005 % ophthalmic solution 1 drop  1 drop Both Eyes At Bedtime John Coleman MD   1 drop at 05/21/24 8635     levothyroxine (SYNTHROID/LEVOTHROID) tablet 137 mcg  137 mcg Oral Daily Mirtha Vargas MD   137 mcg at 05/22/24 0905    losartan (COZAAR) tablet 100 mg  100 mg Oral Daily Mirtha Vargas MD   100 mg at 05/22/24 0905    [Held by provider] metFORMIN (GLUCOPHAGE) tablet 1,000 mg  1,000 mg Oral BID w/meals Mirtha Vargas MD   1,000 mg at 05/19/24 1743    mirtazapine (REMERON) tablet 15 mg  15 mg Oral At Bedtime Mirtha Vargas MD   15 mg at 05/21/24 2207    mirtazapine (REMERON) tablet 45 mg  45 mg Oral At Bedtime Mirtha Vargas MD   45 mg at 05/21/24 2207    polyethylene glycol (MIRALAX) Packet 17 g  17 g Oral Daily Moon Melchor PA-C   17 g at 05/22/24 0904    senna-docusate (SENOKOT-S/PERICOLACE) 8.6-50 MG per tablet 1 tablet  1 tablet Oral BID Moon Melchor PA-C   1 tablet at 05/22/24 0905    simvastatin (ZOCOR) tablet 20 mg  20 mg Oral Daily Mirtha Vargas MD   20 mg at 05/22/24 0905    sodium chloride (PF) 0.9% PF flush 3 mL  3 mL Intracatheter Q8H Moon Melchor PA-C   3 mL at 05/22/24 0409       Data   All microbiology laboratory data reviewed.  Recent Labs   Lab Test 05/22/24  0614 05/21/24  0658 05/20/24  0720 05/18/24  0545 05/17/24  1814   WBC  --   --  13.3* 13.4* 15.7*   HGB 9.6* 8.5* 9.4* 9.0* 10.5*   HCT  --   --  27.5* 26.7* 30.5*   MCV  --   --  88 89 88   PLT  --   --  274 241 291     Recent Labs   Lab Test 05/22/24  0614 05/21/24  0658 05/20/24  0720   CR 1.61* 1.52* 1.62*     Recent Labs   Lab Test 05/17/24  1814   SED 62*          05/20/2024 1025 05/20/2024 1509 Gram Stain [[id number]]   (Abnormal)   Tissue from Knee, Right    Final result Component Value   Culture See corresponding culture for results   Gram Stain Result 3+ Gram positive cocci Abnormal    Gram Stain Result 4+ WBC seen Abnormal    Predominantly PMNs          05/20/2024 1025 05/22/2024 0849 Tissue Aerobic Bacterial Culture Routine [[id number]]   (Abnormal)   Tissue from Knee, Right    Final result Component Value    Culture 4+ Staphylococcus aureus Abnormal     Susceptibilities done on previous cultures             05/20/2024 1021 05/21/2024 1501 Anaerobic Bacterial Culture Routine [[id number]]   Synovial fluid from Knee, Right    Preliminary result Component Value   Culture No anaerobic organisms isolated after 1 day P             05/20/2024 1021 05/22/2024 0829 Synovial fluid Aerobic Bacterial Culture Routine [[id number]]   (Abnormal)   Synovial fluid from Knee, Right    Final result Component Value   Culture 4+ Staphylococcus aureus Abnormal     Susceptibilities done on previous cultures             05/17/2024 2253 05/20/2024 0749 Aspirate Aerobic Bacterial Culture Routine With Gram Stain [[id number]]    (Abnormal)   Aspirate from Synovial Bursa    Final result Component Value   Culture 4+ Staphylococcus aureus Abnormal    Gram Stain Result 1+ Gram positive cocci Abnormal     2+ WBC seen Abnormal     Predominantly PMNs       Susceptibility     Staphylococcus aureus     JACKIE     Ciprofloxacin <=0.5 ug/mL Susceptible *     Clindamycin 0.25 ug/mL Susceptible     Daptomycin 0.25 ug/mL Susceptible     Doxycycline <=0.5 ug/mL Susceptible     Erythromycin <=0.25 ug/mL Susceptible     Gentamicin <=0.5 ug/mL Susceptible     Inducible macrolide resistance test Negative ug/mL Negative *     Levofloxacin <=0.12 ug/mL Susceptible *     Linezolid 2 ug/mL Susceptible *     Moxifloxacin <=0.25 ug/mL Susceptible *     Nitrofurantoin 32 ug/mL Susceptible *     Oxacillin 0.5 ug/mL Susceptible 1     Rifampin <=0.5 ug/mL Susceptible *     Tetracycline <=1 ug/mL Susceptible     Tigecycline <=0.12 ug/mL Susceptible *     Trimethoprim/Sulfamethoxazole <=0.5/9.5 u... Susceptible     Vancomycin 1 ug/mL Susceptible

## 2024-05-23 ENCOUNTER — PATIENT OUTREACH (OUTPATIENT)
Dept: CARE COORDINATION | Facility: CLINIC | Age: 64
End: 2024-05-23
Payer: MEDICARE

## 2024-05-23 ENCOUNTER — TELEPHONE (OUTPATIENT)
Dept: FAMILY MEDICINE | Facility: CLINIC | Age: 64
End: 2024-05-23
Payer: MEDICARE

## 2024-05-23 NOTE — PROGRESS NOTES
"Clinic Care Coordination Contact  Transitions of Care Outreach  Chief Complaint   Patient presents with    Clinic Care Coordination - Post Hospital    Clinic Care Coordination - Initial     Most Recent Admission Date: 5/17/2024   Most Recent Admission Diagnosis: Cellulitis of right leg - L03.115  Septic prepatellar bursitis, right - M71.161     Most Recent Discharge Date: 5/22/2024   Most Recent Discharge Diagnosis: Septic prepatellar bursitis, right - M71.161  Cellulitis of right leg - L03.115     Transitions of Care Assessment    Discharge Assessment  How are you doing now that you are home?: \"sleeping really good, seems to be getting around okay\"  How are your symptoms? (Red Flag symptoms escalate to triage hotline per guidelines): Improved  Do you know how to contact your clinic care team if you have future questions or changes to your health status? : Yes  Does the patient have their discharge instructions? : Yes  Does the patient have questions regarding their discharge instructions? : No  Were you started on any new medications or were there changes to any of your previous medications? : Yes  Does the patient have all of their medications?: Yes  Do you have questions regarding any of your medications? : No  Do you have all of your needed medical supplies or equipment (DME)?  (i.e. oxygen tank, CPAP, cane, etc.): Yes         Post-op (Clinicians Only)  Did the patient have surgery or a procedure: Yes  Incision: healing  Drainage:  (Unable to assess)  Bleeding:  (unable to assess)  Fever: No  Chills: No  Redness: No  Warmth: No  Swelling: No  Incision site pain: No  Closure:  (unsure)  Eating & Drinking: eating and drinking without complaints/concerns  PO Intake: regular diet  Bowel Function: normal  Date of last BM: 05/21/24  Urinary Status: voiding without complaint/concerns    Home care is coming on Saturday.     Follow up Plan     Discharge Follow-Up  Discharge follow up appointment scheduled in alignment " with recommended follow up timeframe or Transitions of Risk Category? (Low = within 30 days; Moderate= within 14 days; High= within 7 days): No  Patient's follow up appointment not scheduled: Patient declined scheduling support. Education on the importance of transitions of care follow up. Provided scheduling phone number.    Future Appointments   Date Time Provider Department Center   6/5/2024 12:00 PM Ruthie House [facility] [place]   6/7/2024  1:00 PM CR LAB CRLABR CR   6/18/2024  1:30 PM Catia An [facility] [place]   6/18/2024  2:00 PM Anant Pinto MD [facility] [place] [facility]   6/26/2024  1:00 PM [facility] [place]   7/9/2024 10:00 AM Lisa Pierre PA-C CRFP CR     Outpatient Plan as outlined on AVS reviewed with patient.    For any urgent concerns, please contact our 24 hour nurse triage line: [phone] ([phone])       Moon Higginbotham RN

## 2024-05-23 NOTE — LETTER
[facility]  [address] [facility] [id number] [phone]  May 23, 2024    Steve Morgan  [address]  [place] [address]    Dear Steve,    I care about your health and have reviewed your health plan. I have reviewed your medical conditions, medication list, and lab results and am making recommendations based on this review, to better manage your health.    You are in particular need of attention regarding:  -Diabetes    I am recommending that you:  {recommendations:-schedule a FOLLOWUP OFFICE APPOINTMENT with me.       Here is a list of Health Maintenance topics that are due now or due soon:  Health Maintenance Due   Topic Date Due    RSV VACCINE (Pregnancy & 60+) (1 - 1-dose 60+ series) Never done    COVID-19 Vaccine (8 - 2023-24 season) 02/04/2024    DIABETIC FOOT EXAM  02/15/2024       Please call us at [phone] (or use [employer]) to address the above recommendations.     Thank you for trusting [facility] and we appreciate the opportunity to serve you.  We look forward to supporting your healthcare needs in the future.    Healthy Regards,    Lisa Pierre PA-C

## 2024-05-23 NOTE — TELEPHONE ENCOUNTER
Patient Quality Outreach    Patient is due for the following:   Diabetes -  Foot Exam    Next Steps:   Schedule a office visit for Diabetes foot exam    Type of outreach:    Sent Oony message.      Questions for provider review:    None           Miriam Cantu CMA

## 2024-05-23 NOTE — PLAN OF CARE
"Occupational Therapy Discharge Summary    Reason for therapy discharge:    Discharged to home with home therapy.    Progress towards therapy goal(s). See goals on Care Plan in [facility] electronic health record for goal details.  Goals not met.  Barriers to achieving goals:   discharge from facility.    Therapy recommendation(s):    Continued therapy is recommended.  Rationale/Recommendations:  per treating OT \"The patient presents below basline and limited by impairments in balance, strength, ROM, activity tolerance, and sensation. Pt is currenlty requiring Ax1 for functional mobility and ADL. The patient does not have 24/7 Ax1 available at home as family works out of the home. Recommend TCU at discharge to progress safety and independence with ADL prior to return home\".    **This patient was not seen by writing OT, information for discharge summary taken from treating OT's notes.**    "

## 2024-05-24 ENCOUNTER — MEDICAL CORRESPONDENCE (OUTPATIENT)
Dept: HEALTH INFORMATION MANAGEMENT | Facility: CLINIC | Age: 64
End: 2024-05-24

## 2024-05-24 ENCOUNTER — TELEPHONE (OUTPATIENT)
Dept: ENDOCRINOLOGY | Facility: CLINIC | Age: 64
End: 2024-05-24
Payer: MEDICARE

## 2024-05-24 ENCOUNTER — TELEPHONE (OUTPATIENT)
Dept: FAMILY MEDICINE | Facility: CLINIC | Age: 64
End: 2024-05-24
Payer: MEDICARE

## 2024-05-24 DIAGNOSIS — Z79.4 TYPE 2 DIABETES MELLITUS WITH DIABETIC NEUROPATHY, WITH LONG-TERM CURRENT USE OF INSULIN (H): ICD-10-CM

## 2024-05-24 DIAGNOSIS — E11.40 TYPE 2 DIABETES MELLITUS WITH DIABETIC NEUROPATHY, WITH LONG-TERM CURRENT USE OF INSULIN (H): ICD-10-CM

## 2024-05-24 DIAGNOSIS — E11.40 TYPE 2 DIABETES MELLITUS WITH DIABETIC NEUROPATHY, UNSPECIFIED (H): ICD-10-CM

## 2024-05-24 DIAGNOSIS — E11.40 TYPE 2 DIABETES MELLITUS WITH DIABETIC NEUROPATHY, WITHOUT LONG-TERM CURRENT USE OF INSULIN (H): Primary | ICD-10-CM

## 2024-05-24 RX ORDER — INSULIN GLARGINE 100 [IU]/ML
40 INJECTION, SOLUTION SUBCUTANEOUS DAILY
Qty: 15 ML | Refills: 0 | Status: SHIPPED | OUTPATIENT
Start: 2024-05-24 | End: 2024-07-09

## 2024-05-24 NOTE — TELEPHONE ENCOUNTER
Requested Prescriptions   Pending Prescriptions Disp Refills    INSULIN GLARGINE 100 UNIT/ML pen [Pharmacy Med Name: BASAGLAR 100 UNIT/ML KWIKPEN]       Sig: INJECT 40 UNITS SUBCUTANEOUS DAILY       Insulin Protocol Failed - 2024  2:45 PM        Failed - Has GFR on file in past 12 months and most recent value is normal        Failed - Recent (6 mo) or future (90 days) visit within the authorizing provider's specialty     The patient must have completed an in-person or virtual visit within the past 6 months or has a future visit scheduled within the next 90 days with the authorizing provider s specialty.  Urgent care and e-visits do not quality as an office visit for this protocol.          Failed - Chart Review Required     Review Chart.    Do not approve if insulin is used in a pump.  Instead, direct refill request to the patient's endocrinologist.  If the patient doesn't have an endocrinologist, then send the refill to the patient's PCP for review            Passed - Medication is active on med list        Passed - Medication indicated for associated diagnosis     Medication is associated with one or more of the following diagnoses:   - Type 1 diabetes mellitus  - Type 2 diabetes mellitus  - Diabetic nephropathy; Prophylaxis  - Neuropathy due to diabetes mellitus; Prophylaxis  - Retinopathy due to diabetes mellitus; Prophylaxis  - Diabetes mellitus associated with cystic fibrosis  - Disorder of cardiovascular system; Prophylaxis - Type 1 diabetes mellitus   - Disorder of cardiovascular system; Prophylaxis - Type 2 diabetes mellitus            Passed - Patient is 18 years of age or older          Continuous Glucose Sensor (FREESTYLE GIULIANA 2 SENSOR) [facility] [Pharmacy Med Name: FREESTYLE GIULIANA 2 SENSOR]  5     Si EACH EVERY 14 DAYS USE 1 SENSOR EVERY 14 DAYS. USE TO READ BLOOD SUGARS PER 'S INSTRUCTIONS.       There is no refill protocol information for this order

## 2024-05-24 NOTE — TELEPHONE ENCOUNTER
Thank you, noted. Branch and clinician updated.  Thank you,  Margaret Schuster LPN  [facility]

## 2024-05-24 NOTE — TELEPHONE ENCOUNTER
Pt called and stated he will have his foot exam with his endocrinologist.    Tianna Caceres/ANUJ

## 2024-05-24 NOTE — TELEPHONE ENCOUNTER
Skilled Nurse visit  1x wk x8.   Focus of Care: R knee Bursitis and poorly controlled DM type 2   OK for PT /OT eval and treat  Please approve the above.  Thank you,  Margaret Schuster LPN  [facility]  [phone]

## 2024-05-24 NOTE — TELEPHONE ENCOUNTER
[facility]    Phone Message    May a detailed message be left on voicemail: yes     Reason for Call: Other: Pt had an appt on 5/20/24 to be seen by kar he was in the hospital at the time, writer got Pt rescheduled for 9/09/24 as the next available, Pt was wondering if this appt is okay to keep or if he would need to be seen sooner to follow up on the labs. Please advise.      Action Taken: Other: Endo     Travel Screening: Not Applicable

## 2024-05-27 LAB — BACTERIA TISS BX CULT: NORMAL

## 2024-05-27 NOTE — TELEPHONE ENCOUNTER
Lab Results   Component Value Date    A1C 8.0 05/14/2024    A1C 7.6 02/26/2024    A1C 8.2 12/05/2023    A1C 9.4 08/31/2023    A1C 7.6 05/02/2023    A1C 7.4 02/22/2021    A1C 9.9 03/12/2020    A1C 11.7 12/05/2019    A1C 11.6 08/29/2019    A1C 11.8 12/14/2018     Please check if sooner appointments or cancellations with Cara Stauffer.  At this time, schedule is all booked.  If BG are running high, recommend CDE follow up as he might get sooner appointment.  Please place referral if needed.

## 2024-05-28 NOTE — TELEPHONE ENCOUNTER
Per provider:  Please check if sooner appointments or cancellations with Cara Stauffer.  At this time, schedule is all booked.  If BG are running high, recommend CDE follow up as he might get sooner appointment.  Please place referral if needed    If no sooner appt CDE referral placed.

## 2024-05-28 NOTE — TELEPHONE ENCOUNTER
Patient states he quit in 1978.  Patient is going to cancel the appointment as per notes he does not meet the criteria for this screening. Patient's chart has been updated    Please advise on CT Chest for patient   No cancellations with Dr. Meraz unless okay to use [facility]IARA, otherwise nothing sooner with Cara Stauffer either.

## 2024-05-28 NOTE — TELEPHONE ENCOUNTER
New diabetes education referral was entered today, scheduling team will reach out to patient to schedule.     Elaine Mccabe RD [facility]  Triage: [phone]  [phone]

## 2024-05-28 NOTE — OP NOTE
Preoperative diagnosis:  Septic prepatellar bursitis right knee    Postoperative diagnosis:  As above    Procedure:  Excisional bursectomy right knee    Surgeon:  Justin Bo MD     Assistant:  Moon Melchor PA-C  A physicians assistant was available for the surgery and participated to decrease the patient's morbidity by assisting with positioning, manipulation of the limb during the procedure, surgical retraction as necessary, closure of the surgical wound and transferring the patient back to a hospital bed    Anesthesia:  General    Estimated blood loss:  50 cc    Complications:  None readily apparent    Indication for procedure:  Steve Morgan is a pleasant 64 year old male that developed pain and swelling about the right knee this past weekend. He had difficulty walking and therefore presented to the emergency room. The prepatellar bursa was aspirated and sent for cultures. He was admitted to the hospital and placed on IV antibiotics. He continued to have pain and swelling and thus, the risks, benefits and alternatives to an excisional bursectomy were discussed with the patient. He expressed understanding and stated a preference to proceed with surgery today.     Description of procedure:  The patient was met in the preoperative area by myself.  Informed consent was obtained as outlined above.  They were in agreement.  Therefore, initials were placed on the operative extremity indicating the correct surgical site.  Patient was then brought to the operating room where an appropriate anesthetic was induced by the anesthesia service.  This was successful.  The patient was subsequently transferred to the surgical table and standard prep and drape performed.  A timeout was called by the surgical team.  The correct patient, hospital identification number, laterality and procedure to be performed were confirmed.  All in attendance were in agreement.  Antibiotics as well as tranexamic acid were given in accordance with  SCIP protocol.      The limb was elevated and exsanguinated with an esmark tourniquet. Pneumatic tourniquet was then inflated to 300 mm Hg. Total tourniquet time for the case was 23 minutes. A midline incision was then performed over the anterior aspect of the knee. Alberto purulence was returned upon entering the bursal space. Full thickness skin flaps were elevated. This was isolated to the prepatellar bursa and did not track deep. The extensor mechanism was fully intact. The fibrous rind around the prepatellar bursa was then excised sharply and sent for culture. Hemostasis was archived with electrocautery. The tourniquet was released and an anatomic layered closure was then performed with monofilament suture. All counts were correct at the end of the case    Postoperative plan:  Caio will return to the hospital for pain control and monitoring. Will tailor antibiotics in conjunction with the infectious disease team. Follow up in 2 weeks for suture removal    Justin Bo MD

## 2024-05-31 ENCOUNTER — TELEPHONE (OUTPATIENT)
Dept: FAMILY MEDICINE | Facility: CLINIC | Age: 64
End: 2024-05-31
Payer: MEDICARE

## 2024-05-31 ENCOUNTER — TRANSFERRED RECORDS (OUTPATIENT)
Dept: HEALTH INFORMATION MANAGEMENT | Facility: CLINIC | Age: 64
End: 2024-05-31
Payer: MEDICARE

## 2024-05-31 NOTE — TELEPHONE ENCOUNTER
Requesting Physical Therapy visits 1x per week for 4 weeks due to recent infection in the Rt knee and surgical aftercare.   Please approve.  Thank you,  Margaret Schuster LPN  [facility]

## 2024-06-01 DIAGNOSIS — E03.4 HYPOTHYROIDISM DUE TO ACQUIRED ATROPHY OF THYROID: ICD-10-CM

## 2024-06-03 ENCOUNTER — MYC REFILL (OUTPATIENT)
Dept: PSYCHIATRY | Facility: CLINIC | Age: 64
End: 2024-06-03
Payer: MEDICARE

## 2024-06-03 LAB — BACTERIA SNV CULT: NORMAL

## 2024-06-03 RX ORDER — LEVOTHYROXINE SODIUM 137 UG/1
137 TABLET ORAL DAILY
Qty: 30 TABLET | Refills: 2 | Status: SHIPPED | OUTPATIENT
Start: 2024-06-03 | End: 2024-08-29

## 2024-06-03 NOTE — TELEPHONE ENCOUNTER
1) Reviewed refill request(s) from    [facility]Y #[id number] - [place], [place] - [address]      2) Any Controlled Substance(s)? No      3) Refill(s) requested for:   - bupropion (Wellbutrin XL) 300 mg tablet    This order was discontinued on 4/10/2024      4) Action taken: Refill request(s) sent back to pharmacy as DENIED via Right Fax.    Bisi De RN on 6/3/2024 at 4:02 PM

## 2024-06-03 NOTE — TELEPHONE ENCOUNTER
Requested Prescriptions   Pending Prescriptions Disp Refills    LEVOXYL 137 MCG tablet [Pharmacy Med Name: LEVOXYL 137 MCG TABLET] 30 tablet 0     Sig: TAKE 1 TABLET BY MOUTH EVERY DAY       Thyroid Protocol Failed - 6/1/2024  4:39 PM        Failed - Medication indicated for associated diagnosis     Medication is associated with one or more of the following diagnoses:  Hypothyroidism  Thyroid stimulating hormone suppression therapy  Thyroid cancer          Passed - Patient is 12 years or older        Passed - Recent (12 mo) or future (30 days) visit within the authorizing provider's specialty     The patient must have completed an in-person or virtual visit within the past 12 months or has a future visit scheduled within the next 90 days with the authorizing provider s specialty.  Urgent care and e-visits do not quality as an office visit for this protocol.          Passed - Medication is active on med list        Passed - Normal TSH on file in past 12 months     Recent Labs   Lab Test 05/14/24  1303   TSH 4.08

## 2024-06-03 NOTE — TELEPHONE ENCOUNTER
Thank you noted. Branch and Clinician updated.  Margaret Schuster LPN  [facility]

## 2024-06-04 ENCOUNTER — ALLIED HEALTH/NURSE VISIT (OUTPATIENT)
Dept: FAMILY MEDICINE | Facility: CLINIC | Age: 64
End: 2024-06-04
Payer: MEDICARE

## 2024-06-04 DIAGNOSIS — Z23 NEED FOR COVID-19 VACCINE: Primary | ICD-10-CM

## 2024-06-04 PROCEDURE — 90480 ADMN SARSCOV2 VAC 1/ONLY CMP: CPT

## 2024-06-04 PROCEDURE — 99207 PR NO CHARGE NURSE ONLY: CPT

## 2024-06-04 PROCEDURE — 91320 SARSCV2 VAC 30MCG TRS-SUC IM: CPT

## 2024-06-05 ENCOUNTER — TELEPHONE (OUTPATIENT)
Dept: FAMILY MEDICINE | Facility: CLINIC | Age: 64
End: 2024-06-05
Payer: MEDICARE

## 2024-06-05 DIAGNOSIS — Z53.9 DIAGNOSIS NOT YET DEFINED: Primary | ICD-10-CM

## 2024-06-05 PROCEDURE — G0180 MD CERTIFICATION HHA PATIENT: HCPCS | Performed by: PHYSICIAN ASSISTANT

## 2024-06-05 NOTE — TELEPHONE ENCOUNTER
1 year postcard sent to pt to schedule in February Form faxed, 06/05/2024    Tianna Caceres/ANUJ

## 2024-06-14 ENCOUNTER — TELEPHONE (OUTPATIENT)
Dept: FAMILY MEDICINE | Facility: CLINIC | Age: 64
End: 2024-06-14
Payer: MEDICARE

## 2024-06-14 DIAGNOSIS — Z53.9 DIAGNOSIS NOT YET DEFINED: Primary | ICD-10-CM

## 2024-06-14 PROCEDURE — G0180 MD CERTIFICATION HHA PATIENT: HCPCS | Performed by: PHYSICIAN ASSISTANT

## 2024-06-14 NOTE — PROGRESS NOTES
[facility]  6/18/2024      Behavioral Health Clinician Progress Note    Patient Name: Steve Morgan           Service Type:  Individual      Service Location:   MyChart / Email (patient reached)     Session Start Time: 133pm  Session End Time: 206pm      Session Length: 16 - 37      Attendees: Patient     Service Modality:  Video Visit:      Provider verified identity through the following two step process.  Patient provided:  Patient photo and Patient was verified at admission/transfer    Telemedicine Visit: The patient's condition can be safely assessed and treated via synchronous audio and visual telemedicine encounter.      Reason for Telemedicine Visit: Services only offered telehealth    Originating Site (Patient Location): Patient's home    Distant Site (Provider Location): Provider Remote Setting- Home Office    Consent:  The patient/guardian has verbally consented to: the potential risks and benefits of telemedicine (video visit) versus in person care; bill my insurance or make self-payment for services provided; and responsibility for payment of non-covered services.     Patient would like the video invitation sent by:  My Chart    Mode of Communication:  Video Conference via [facility]    Distant Location (Provider):  Off-site    As the provider I attest to compliance with applicable laws and regulations related to telemedicine.    Visit Activities (Refresh list every visit): [facility] Only    Diagnostic Assessment Date: 1/31/24 Margaret Asif   Treatment Plan Review Date: 7/10/2024   See Flowsheets for today's PHQ-9 and JOSE MANUEL-7 results  Previous PHQ-9:       4/10/2024     2:12 PM 5/1/2024    10:17 AM 6/18/2024     8:48 AM   PHQ-9 SCORE   PHQ-9 Total Score Jarrod 17 (Moderately severe depression) 9 (Mild depression) 11 (Moderate depression)   PHQ-9 Total Score 17 9 11     Previous JOSE MANUEL-7:       3/10/2023     1:48 PM 6/30/2023     8:21 AM 3/12/2024    10:24 AM    JOSE MANUEL-7 SCORE   Total Score 5 (mild anxiety) 3 (minimal anxiety) 6 (mild anxiety)   Total Score 5 3 6   See JOSE MANUEL 2 below for current scores.     SHANNON LEVEL:      1/15/2020     8:18 PM 12/16/2022     2:26 PM   SHANNON Score (Last Two)   SHANNON Raw Score 36 34   Activation Score 75.5 68.9   SHANNON Level 4 3       DATA  Extended Session (60+ minutes): No  Interactive Complexity: No  Crisis: No  [facility] Patient: No    Treatment Objective(s) Addressed in This Session:  Target Behavior(s):  improve stress and anger/irritability    Depressed Mood: Decrease frequency and intensity of feeling down, depressed, hopeless  Improve quantity and quality of night time sleep / decrease daytime naps  Feel less tired and more energy during the day   Anger Management: will learn and practice positive anger management skills     Current Stressors / Issues:  Questions/Thoughts for Dr. Pinto:    Better/worse: less irritability, able to be present on vacation   ADLs: sleep- good, sleeping in a bit, feeling like he is getting enough, appetite- good  Current Symptoms: 3rd week in May pt was in the hospital with a knee infection. Then he went on vacation. He was able to be present on the vacation. Irritability has been okay. They are still wanting to go to [employer] more. He says that he needs to exercise more which would help with his diabetes.     Side Effects:  Therapist: a couple weeks ago he started DBT, had a visit with DBT therapist   [facility] recommendations: listen to music or watch a show to exercise     Progress towards goals: being on vacation he was less irritable and able to focus on things in the moment and be present and he is starting DBT      Progress on Treatment Objective(s) / Homework:  Satisfactory progress - ACTION (Actively working towards change); Intervened by reinforcing change plan / affirming steps taken     CBT: Pt says that while on vacation he was able to be in the moment and he didn't have to worry about things with the house. He  states that he met with the DBT therapist for the first time a couple of weeks ago. He also reports that a provider told him that he should work on managing his diabetes through eating more healthy and exercise. To attend [employer], pt describes steps he thinks he may take to help get them back to attending.     [employer] encourages pt to explore ways to get started with exercise. Pt says that their son-in-law recommended he watch a episode of Star Trek while peddling a bike. [employer] provides education about research that shows exercise can help with focus and maybe retaining information. [employer] celebrates with pt that they are taking steps to start DBT therapy.     Motivational Interviewing    MI Intervention: Co-Developed Goal: reduce irritability and developing supports and community, Expressed Empathy/Understanding, Supported Autonomy, Collaboration, Evocation, Permission to raise concern or advise, Open-ended questions, Reflections: simple and complex, Change talk (evoked), and Reframe     Change Talk Expressed by the Patient: Committment to change Activation Taking steps    Provider Response to Change Talk: E - Evoked more info from patient about behavior change, A - Affirmed patient's thoughts, decisions, or attempts at behavior change, R - Reflected patient's change talk, and S - Summarized patient's change talk statements    Also provided psychoeducation about behavioral health condition, symptoms, and treatment options    Assessments completed prior to visit:  The following assessments were completed by patient for this visit:  PHQ9:       11/30/2023     1:00 PM 1/11/2024     9:12 AM 2/26/2024     5:20 PM 3/12/2024    10:23 AM 4/10/2024     2:12 PM 5/1/2024    10:17 AM 6/18/2024     8:48 AM   PHQ-9 SCORE   PHQ-9 Total Score [facility]  8 (Mild depression) 15 (Moderately severe depression) 8 (Mild depression) 17 (Moderately severe depression) 9 (Mild depression) 11 (Moderate depression)   PHQ-9 Total Score 8 8    8 15 8 17 9  11     PROMIS 10-Global Health (only subscores and total score):       5/15/2023    10:44 AM 5/21/2023     4:35 PM 8/23/2023     2:16 PM 11/16/2023    10:10 AM 12/1/2023     4:00 PM 3/12/2024    10:26 AM 4/10/2024     2:15 PM   PROMIS-10 Scores Only   Global Mental Health Score 9 10 11 9  7 10    10   Global Physical Health Score 12 13 15 12  11 13    13   PROMIS TOTAL - SUBSCORES 21 23 26 21  18 23    23       Information is confidential and restricted. Go to Review Flowsheets to unlock data.    Multiple values from one day are sorted in reverse-chronological order     GAD2:       5/15/2023    10:41 AM 5/21/2023     4:33 PM 8/23/2023     2:14 PM 11/16/2023    10:08 AM 2/26/2024     5:22 PM 5/31/2024    11:23 AM 6/18/2024     8:48 AM   JOSE MANUEL-2   Feeling nervous, anxious, or on edge 1 0 1 0 1 1 1   Not being able to stop or control worrying 0 0 0 0 0 1 1   JOSE MANUEL-2 Total Score 1 0 1 0 1    1 2 2    2       Care Plan review completed: Yes    Medication Review:  No changes to current psychiatric medication(s)    Medication Compliance:  Yes    Changes in Health Issues:   None reported    Chemical Use Review:   Substance Use: Chemical use reviewed, no active concerns identified      Tobacco Use: No current tobacco use.      Assessment: Current Emotional / Mental Status (status of significant symptoms):  Risk status (Self / Other harm or suicidal ideation)  Patient denies a history of suicidal ideation, suicide attempts, self-injurious behavior, homicidal ideation, homicidal behavior, and and other safety concerns   Patient denies current fears or concerns for personal safety.  Patient denies current or recent suicidal ideation or behaviors.  Patient denies current or recent homicidal ideation or behaviors.  Patient denies current or recent self injurious behavior or ideation.  Patient denies other safety concerns.  A safety and risk management plan has not been developed at this time, however patient was encouraged to call  [place] [phone] should there be a change in any of these risk factors.    Appearance:   Appropriate    Eye Contact:   Good   Psychomotor Behavior: Normal   Attitude:   Cooperative  Interested Pleasant  Orientation:   All  Speech   Rate / Production: Normal    Volume:  Normal   Mood:    Normal Irritable, though less than last visit  Affect:    Appropriate   Thought Content:  Clear   Thought Form:  Coherent  Logical   Insight:    Good     Diagnoses:  1. Mild episode of recurrent major depressive disorder (H24)        Collateral Reports Completed:  Communicated with: Anant Pinto M.D.     Plan: (Homework, other):  Patient was given information about behavioral services and encouraged to schedule a follow up appointment with the clinic [facility] in 1 month.  He was also given information about mental health symptoms and treatment options .  CD Recommendations: No indications of CD issues.     Catia An, [facility]    ______________________________________________________________________    Integrated Primary Care Behavioral Health Treatment Plan    Patient's Name: Steve Morgan  YOB: 1960    Date of Creation: 1/11/2024  Date Treatment Plan Last Reviewed/Revised: 4/10/24    DSM5 Diagnoses:   1. Mild episode of recurrent major depressive disorder (H24)      Psychosocial / Contextual Factors: has wife, irritability/anger  PROMIS (reviewed every 90 days):   PROMIS 10-Global Health (only subscores and total score):       5/15/2023    10:44 AM 5/21/2023     4:35 PM 8/23/2023     2:16 PM 11/16/2023    10:10 AM 12/1/2023     4:00 PM 3/12/2024    10:26 AM 4/10/2024     2:15 PM   PROMIS-10 Scores Only   Global Mental Health Score 9 10 11 9  7 10    10   Global Physical Health Score 12 13 15 12  11 13    13   PROMIS TOTAL - SUBSCORES 21 23 26 21  18 23    23       Information is confidential and restricted. Go to Review Flowsheets to unlock data.    Multiple values from one day are sorted in reverse-chronological  "order       Referral / Collaboration:  Referral to another professional/service is not indicated at this time.    Anticipated number of session for this episode of care: 5-6  Anticipation frequency of session: Monthly  Anticipated Duration of each session: 16-37 minutes  Treatment plan will be reviewed in 90 days or when goals have been changed.       MeasurableTreatment Goal(s) related to diagnosis / functional impairment(s)  Goal 1: Patient will report improved irritability and depression     Pt will know he's met goal when \"I am looking into joining an anger group.\"     Objective #A (Patient Action)                          Patient will work to find 1-2 ways to reduce anger/irritability.   Status: Continue - Date: 4/10/2024    Intervention(s)  Therapist provide support through CBT, MI, Acceptance and Commitment Therapy and problem solving model to explore and overcome barriers.      Patient has reviewed and agreed to the above plan.      Catia An, [facility]  April 10, 2024    "

## 2024-06-14 NOTE — TELEPHONE ENCOUNTER
Forms/Letter Request    Type of form/letter: Home Health Certification      Do we have the form/letter: Yes:     Who is the form from? Home care[facility]    Where did/will the form come from? form was faxed in    When is form/letter needed by: asap    How would you like the form/letter returned: Fax : [phone]    Patient Notified form requests are processed in 5-7 business days: N/A    Could we send this information to you in [employer] or would you prefer to receive a phone call?:   N/A

## 2024-06-18 ENCOUNTER — VIRTUAL VISIT (OUTPATIENT)
Dept: PSYCHIATRY | Facility: CLINIC | Age: 64
End: 2024-06-18
Payer: MEDICARE

## 2024-06-18 ENCOUNTER — VIRTUAL VISIT (OUTPATIENT)
Dept: BEHAVIORAL HEALTH | Facility: CLINIC | Age: 64
End: 2024-06-18
Payer: MEDICARE

## 2024-06-18 ENCOUNTER — VIRTUAL VISIT (OUTPATIENT)
Dept: EDUCATION SERVICES | Facility: CLINIC | Age: 64
End: 2024-06-18
Attending: INTERNAL MEDICINE
Payer: MEDICARE

## 2024-06-18 VITALS — BODY MASS INDEX: 41.65 KG/M2 | WEIGHT: 250 LBS | HEIGHT: 65 IN

## 2024-06-18 DIAGNOSIS — E11.40 TYPE 2 DIABETES MELLITUS WITH DIABETIC NEUROPATHY, WITHOUT LONG-TERM CURRENT USE OF INSULIN (H): ICD-10-CM

## 2024-06-18 DIAGNOSIS — F33.0 MILD EPISODE OF RECURRENT MAJOR DEPRESSIVE DISORDER (H): Primary | ICD-10-CM

## 2024-06-18 DIAGNOSIS — F33.1 MAJOR DEPRESSIVE DISORDER, RECURRENT EPISODE, MODERATE (H): Primary | ICD-10-CM

## 2024-06-18 PROCEDURE — 90832 PSYTX W PT 30 MINUTES: CPT | Mod: 95 | Performed by: COUNSELOR

## 2024-06-18 PROCEDURE — 99214 OFFICE O/P EST MOD 30 MIN: CPT | Mod: 95 | Performed by: PSYCHIATRY & NEUROLOGY

## 2024-06-18 PROCEDURE — G0108 DIAB MANAGE TRN  PER INDIV: HCPCS | Performed by: DIETITIAN, REGISTERED

## 2024-06-18 RX ORDER — VILAZODONE HYDROCHLORIDE 10 MG/1
10 TABLET ORAL DAILY
Qty: 30 TABLET | Refills: 1 | Status: SHIPPED | OUTPATIENT
Start: 2024-06-18 | End: 2024-09-06

## 2024-06-18 RX ORDER — MIRTAZAPINE 15 MG/1
15 TABLET, FILM COATED ORAL AT BEDTIME
Qty: 30 TABLET | Refills: 2 | Status: SHIPPED | OUTPATIENT
Start: 2024-06-18 | End: 2024-09-17

## 2024-06-18 RX ORDER — MIRTAZAPINE 45 MG/1
45 TABLET, FILM COATED ORAL AT BEDTIME
Qty: 30 TABLET | Refills: 2 | Status: SHIPPED | OUTPATIENT
Start: 2024-06-18 | End: 2024-09-17

## 2024-06-18 ASSESSMENT — PATIENT HEALTH QUESTIONNAIRE - PHQ9
10. IF YOU CHECKED OFF ANY PROBLEMS, HOW DIFFICULT HAVE THESE PROBLEMS MADE IT FOR YOU TO DO YOUR WORK, TAKE CARE OF THINGS AT HOME, OR GET ALONG WITH OTHER PEOPLE: SOMEWHAT DIFFICULT
SUM OF ALL RESPONSES TO PHQ QUESTIONS 1-9: 11
SUM OF ALL RESPONSES TO PHQ QUESTIONS 1-9: 11

## 2024-06-18 ASSESSMENT — PAIN SCALES - GENERAL: PAINLEVEL: NO PAIN (0)

## 2024-06-18 NOTE — LETTER
"    6/18/2024         RE: Steve Morgan  [id number]  [facility] [id number]        Dear Colleague,    Thank you for referring your patient, Steve Morgan, to the [facility]. Please see a copy of my visit note below.    Diabetes Self-Management Education & Support    Presents for: Individual review    Type of service:  Video Visit    If the video visit is dropped, the video visit invitation should be resent by: Text to cell phone: [phone]    Originating Location (pt. Location): Home  Distant Location (provider location): Offsite  Mode of Communication:  Video Conference via [employer] Start Time:  11:00  Video End Time (time video stopped): 11:35    How would patient like to obtain AVS? MyChart      ASSESSMENT:  Provided DM Ed review. Pt reports struggling meal planning, though has had extensive education in the past. Discussed additional resources. Pt will check out ADA online.  Pt has followed Weight Watchers in the past, and thought that worked very well. Does better with some structure, otherwise does not do as well with making healthy food choices. Also enjoys eating out 1- 2x/ week. Pt c/o increased constipation due to \"water restriction because of his kidney disease\". Discussed diet, fiber supplements, etc. Pt not currently using Nina to monitor blood sugars, but does have sensors available. Has only used Nina reader in the past, thus CDE not able to view reports. Pt thought only Nina 3 compatible with phone. Advised pt can use Nina 2 Glenys on phone, and Nina reports can be viewed by CDE at next visit. Pt agreeable.     Patient's most recent   Lab Results   Component Value Date    A1C 8.0 05/14/2024    A1C 7.4 02/22/2021     is not meeting goal of <7.0    Diabetes knowledge and skills assessment:   Patient is knowledgeable in diabetes management concepts related to: needs review    Continue education with the following diabetes management concepts: " "Healthy Eating, Monitoring, and Problem Solving    Based on learning assessment above, most appropriate setting for further diabetes education would be: Individual setting.      PLAN    Topics to cover at upcoming visits: Healthy Eating and Monitoring    Follow-up: 3 weeks    See Care Plan for co-developed, patient-state behavior change goals.  AVS provided for patient today.    Education Materials Provided:  No new materials provided today      SUBJECTIVE/OBJECTIVE:  Presents for: Individual review  Accompanied by: Self  Diabetes education in the past 24mo: No  Focus of Visit: Assistance w/ making life changes, Healthy Eating  Diabetes type: Type 2  Disease course: Worsening  How confident are you filling out medical forms by yourself:: Extremely  Transportation concerns: No  Other concerns:: Glasses  Cultural Influences/Ethnic Background:  Not  or     Diabetes Symptoms & Complications:  Diabetes Related Symptoms: Fatigue, Polyphagia (increased hunger), Visual change  Weight trend: Increasing  Symptom course: Stable  Disease course: Worsening  Complications assessed today?: Yes  Nephropathy: Yes    Patient Problem List and Family Medical History reviewed for relevant medical history, current medical status, and diabetes risk factors.    Vitals:  There were no vitals taken for this visit.  Estimated body mass index is 41.6 kg/m  as calculated from the following:    Height as of 5/17/24: 1.651 m (5' 5\").    Weight as of 5/17/24: 113.4 kg (250 lb).   Last 3 BP:   BP Readings from Last 3 Encounters:   05/22/24 (!) 170/81   02/01/24 136/71   11/17/23 137/84       History   Smoking Status     Never   Smokeless Tobacco     Never       Labs:  Lab Results   Component Value Date    A1C 8.0 05/14/2024    A1C 7.4 02/22/2021     Lab Results   Component Value Date     05/22/2024     09/24/2021     01/16/2020     Lab Results   Component Value Date     02/26/2024    LDL 88 03/05/2020 " "    HDL Cholesterol   Date Value Ref Range Status   03/05/2020 33 (L) >39 mg/dL Final     Direct Measure HDL   Date Value Ref Range Status   02/26/2024 26 (L) >=40 mg/dL Final   ]  GFR Estimate   Date Value Ref Range Status   05/22/2024 47 (L) >60 mL/min/1.73m2 Final   01/16/2020 79 >60 mL/min/[1.73_m2] Final     Comment:     Non  GFR Calc  Starting 12/18/2018, serum creatinine based estimated GFR (eGFR) will be   calculated using the Chronic Kidney Disease Epidemiology Collaboration   (CKD-EPI) equation.       GFR Estimate If Black   Date Value Ref Range Status   01/16/2020 >90 >60 mL/min/[1.73_m2] Final     Comment:      GFR Calc  Starting 12/18/2018, serum creatinine based estimated GFR (eGFR) will be   calculated using the Chronic Kidney Disease Epidemiology Collaboration   (CKD-EPI) equation.       Lab Results   Component Value Date    CR 1.61 05/22/2024    CR 1.01 01/16/2020     No results found for: \"MICROALBUMIN\"    Healthy Eating:  Healthy Eating Assessed Today: Yes  Cultural/Muslim diet restrictions?: No  Meal planning/habits: Other  Who cooks/prepares meals for you?: Self, Spouse  Who purchases food in  your home?: Self, Spouse  How many times a week on average do you eat food made away from home (restaurant/take-out)?: 1  Meals include: Breakfast, Lunch, Dinner, Evening Snack  Breakfast: cereal  Lunch: varies  Dinner: varies  Other: wants help with menu planning  Beverages: Water, Milk  Has patient met with a dietitian in the past?: Yes    Being Active:  Being Active Assessed Today: No  Barrier to exercise: None    Monitoring:  Monitoring Assessed Today: Yes  Blood Glucose Meter: FreeStyle  Times checking blood sugar at home (number): Never  Times checking blood sugar at home (per): Day    Taking Medications:  Diabetes Medication(s)       Biguanides       metFORMIN (GLUCOPHAGE) 1000 MG tablet TAKE 1 TABLET BY MOUTH TWICE A DAY WITH MEALS       Insulin       insulin " glargine 100 UNIT/ML pen INJECT 40 UNITS SUBCUTANEOUS DAILY       Sulfonylureas       glipiZIDE (GLUCOTROL XL) 10 MG 24 hr tablet TAKE 1 TABLET (10 MG) BY MOUTH DAILY.            Taking Medication Assessed Today: Yes  Current Treatments: Insulin Injections, Oral Medication (taken by mouth)  Problems taking diabetes medications regularly?: No    Problem Solving:  Problem Solving Assessed Today: Yes  Is the patient at risk for hypoglycemia?: Yes  Hypoglycemia Frequency: Rarely  Hypoglycemia Treatment: Other food    Reducing Risks:  Reducing Risks Assessed Today: No  Has dilated eye exam at least once a year?: Yes  Sees dentist every 6 months?: Yes  Feet checked by healthcare provider in the last year?: No    Healthy Coping:  Healthy Coping Assessed Today: Yes  Emotional response to diabetes: Concern for health and well-being  Informal Support system:: Children, Family, Spouse  Stage of change: PREPARATION (Decided to change - considering how)  Patient Activation Measure Survey Score:      1/15/2020     8:18 PM 12/16/2022     2:26 PM   SHANNON Score (Last Two)   SHANNON Raw Score 36 34   Activation Score 75.5 68.9   SHANNON Level 4 3       Care Plan and Education Provided:  Care Plan: Diabetes   Updates made by Lucía Chávez RD since 6/18/2024 12:00 AM        Problem: HbA1C Not In Goal         Goal: Establish Regular Follow-Ups with PCP         Task: Discuss with PCP the recommended timing for patient's next follow up visit(s)    Responsible User: Lucía Chávez RD        Task: Discuss schedule for PCP visits with patient    Responsible User: Lucía Chávez RD        Goal: Get HbA1C Level in Goal         Task: Educate patient on diabetes education self-management topics    Responsible User: Lucía Chávez RD        Task: Educate patient on benefits of regular glucose monitoring    Responsible User: Lucía Chávez RD        Task: Refer patient to appropriate extended care team member, as needed (Medication Therapy  Management, Behavioral Health, Physical Therapy, etc.)    Responsible User: Lucía Chávez RD        Task: Discuss diabetes treatment plan with patient    Responsible User: Lucía Chávez RD        Problem: Diabetes Self-Management Education Needed to Optimize Self-Care Behaviors         Goal: Understand diabetes pathophysiology and disease progression         Task: Provide education on diabetes pathophysiology and disease progression specfic to patient's diabetes type    Responsible User: Lucía Chávez RD        Goal: Healthy Eating - follow a healthy eating pattern for diabetes         Task: Provide education on portion control and consistency in amount, composition and timing of food intake    Responsible User: Lucía Chávez RD        Task: Provide education on managing carbohydrate intake (carbohydrate counting, plate planning method, etc.)    Responsible User: Lucía Chávez RD        Task: Provide education on weight management    Responsible User: Lucía Chávez RD        Task: Provide education on heart healthy eating    Responsible User: Lucía Chávez RD        Task: Provide education on eating out    Responsible User: Lucía Chávez RD        Task: Develop individualized healthy eating plan with patient Completed 6/18/2024   Responsible User: Lucía Chávez RD        Goal: Being Active - get regular physical activity, working up to at least 150 minutes per week         Task: Provide education on relationship of activity to glucose and precautions to take if at risk for low glucose    Responsible User: Lucía Chávez RD        Task: Discuss barriers to physical activity with patient    Responsible User: Lucía Chávez RD        Task: Develop physical activity plan with patient    Responsible User: Lucía Chávez RD        Task: Explore community resources including walking groups, assistance programs, and home videos    Responsible User: Lucía Chávez RD        Goal: Monitoring  - monitor glucose and ketones as directed         Task: Provide education on blood glucose monitoring (purpose, proper technique, frequency, glucose targets, interpreting results, when to use glucose control solution, sharps disposal)    Responsible User: Lucía Chávez RD        Task: Provide education on continuous glucose monitoring (sensor placement, use of suleman or /reader, understanding glucose trends, alerts and alarms, differences between sensor glucose and blood glucose) Completed 6/18/2024   Responsible User: Lucía Chávez RD        Task: Provide education on ketone monitoring (when to monitor, frequency, etc.)    Responsible User: Lucía Chávez RD        Goal: Taking Medication - patient is consistently taking medications as directed         Task: Provide education on action of prescribed medication, including when to take and possible side effects    Responsible User: Lucía Chávez RD        Task: Provide education on insulin and injectable diabetes medications, including administration, storage, site selection and rotation for injection sites    Responsible User: Lucía Chávez RD        Task: Discuss barriers to medication adherence with patient and provide management technique ideas as appropriate    Responsible User: Lucía Chávez RD        Task: Provide education on frequency and refill details of medications    Responsible User: Lucía Chávez RD        Goal: Problem Solving - know how to prevent and manage short-term diabetes complications         Task: Provide education on high blood glucose - causes, signs/symptoms, prevention and treatment    Responsible User: Lucía Chávez RD        Task: Provide education on low blood glucose - causes, signs/symptoms, prevention, treatment, carrying a carbohydrate source at all times, and medical identification Completed 6/18/2024   Responsible User: Lucía Chávez RD        Task: Provide education on safe travel with diabetes     Responsible User: Lucía Chávez RD        Task: Provide education on how to care for diabetes on sick days    Responsible User: Luíca Chávez RD        Task: Provide education on when to call a health care provider    Responsible User: Lucía Chávez RD        Goal: Reducing Risks - know how to prevent and treat long-term diabetes complications         Task: Provide education on major complications of diabetes, prevention, early diagnostic measures and treatment of complications    Responsible User: Lucía Chávez RD        Task: Provide education on recommended care for dental, eye and foot health    Responsible User: Lucía Chávez RD        Task: Provide education on Hemoglobin A1c - goals and relationship to blood glucose levels    Responsible User: Lucía Chávez RD        Task: Provide education on recommendations for heart health - lipid levels and goals, blood pressure and goals, and aspirin therapy, if indicated    Responsible User: Lucía Chávez RD        Task: Provide education on tobacco cessation    Responsible User: Lucía Chávez RD        Goal: Healthy Coping - use available resources to cope with the challenges of managing diabetes         Task: Discuss recognizing feelings about having diabetes    Responsible User: Lucía Chávez RD        Task: Provide education on the benefits of making appropriate lifestyle changes Completed 6/18/2024   Responsible User: Lucía Chávez RD        Task: Provide education on benefits of utilizing support systems    Responsible User: Lucía Chávez RD        Task: Discuss methods for coping with stress    Responsible User: Lucía Chávez RD        Task: Provide education on when to seek professional counseling    Responsible User: Lucía Chávez RD Mary Spencer RD, [facility]  Diabetes     Time Spent: 35 minutes  Encounter Type: Individual    Any diabetes medication dose changes were made via the CDE Protocol  per the patient's endocrinology provider. A copy of this encounter was shared with the provider.

## 2024-06-18 NOTE — PROGRESS NOTES
"Diabetes Self-Management Education & Support    Presents for: Individual review    Type of service:  Video Visit    If the video visit is dropped, the video visit invitation should be resent by: Text to cell phone: [phone]    Originating Location (pt. Location): Home  Distant Location (provider location): Offsite  Mode of Communication:  Video Conference via [employer]    Video Start Time:  11:00  Video End Time (time video stopped): 11:35    How would patient like to obtain AVS? MyChart      ASSESSMENT:  Provided DM Ed review. Pt reports struggling meal planning, though has had extensive education in the past. Discussed additional resources. Pt will check out ADA online.  Pt has followed Weight [employer] in the past, and thought that worked very well. Does better with some structure, otherwise does not do as well with making healthy food choices. Also enjoys eating out 1- 2x/ week. Pt c/o increased constipation due to \"water restriction because of his kidney disease\". Discussed diet, fiber supplements, etc. Pt not currently using Nina to monitor blood sugars, but does have sensors available. Has only used Nina reader in the past, thus CDE not able to view reports. Pt thought only Nina 3 compatible with phone. Advised pt can use Nina 2 [place] on phone, and Nina reports can be viewed by CDE at next visit. Pt agreeable.     Patient's most recent   Lab Results   Component Value Date    A1C 8.0 05/14/2024    A1C 7.4 02/22/2021     is not meeting goal of <7.0    Diabetes knowledge and skills assessment:   Patient is knowledgeable in diabetes management concepts related to: needs review    Continue education with the following diabetes management concepts: Healthy Eating, Monitoring, and Problem Solving    Based on learning assessment above, most appropriate setting for further diabetes education would be: Individual setting.      PLAN    Topics to cover at upcoming visits: Healthy Eating and " "Monitoring    Follow-up: 3 weeks    See Care Plan for co-developed, patient-state behavior change goals.  AVS provided for patient today.    Education Materials Provided:  No new materials provided today      SUBJECTIVE/OBJECTIVE:  Presents for: Individual review  Accompanied by: Self  Diabetes education in the past 24mo: No  Focus of Visit: Assistance w/ making life changes, Healthy Eating  Diabetes type: Type 2  Disease course: Worsening  How confident are you filling out medical forms by yourself:: Extremely  Transportation concerns: No  Other concerns:: Glasses  Cultural Influences/Ethnic Background:  Not  or     Diabetes Symptoms & Complications:  Diabetes Related Symptoms: Fatigue, Polyphagia (increased hunger), Visual change  Weight trend: Increasing  Symptom course: Stable  Disease course: Worsening  Complications assessed today?: Yes  Nephropathy: Yes    Patient Problem List and Family Medical History reviewed for relevant medical history, current medical status, and diabetes risk factors.    Vitals:  There were no vitals taken for this visit.  Estimated body mass index is 41.6 kg/m  as calculated from the following:    Height as of 5/17/24: 1.651 m (5' 5\").    Weight as of 5/17/24: 113.4 kg (250 lb).   Last 3 BP:   BP Readings from Last 3 Encounters:   05/22/24 (!) 170/81   02/01/24 136/71   11/17/23 137/84       History   Smoking Status    Never   Smokeless Tobacco    Never       Labs:  Lab Results   Component Value Date    A1C 8.0 05/14/2024    A1C 7.4 02/22/2021     Lab Results   Component Value Date     05/22/2024     09/24/2021     01/16/2020     Lab Results   Component Value Date     02/26/2024    LDL 88 03/05/2020     HDL Cholesterol   Date Value Ref Range Status   03/05/2020 33 (L) >39 mg/dL Final     Direct Measure HDL   Date Value Ref Range Status   02/26/2024 26 (L) >=40 mg/dL Final   ]  GFR Estimate   Date Value Ref Range Status   05/22/2024 47 (L) >60 " "mL/min/1.73m2 Final   01/16/2020 79 >60 mL/min/[1.73_m2] Final     Comment:     Non  GFR Calc  Starting 12/18/2018, serum creatinine based estimated GFR (eGFR) will be   calculated using the Chronic Kidney Disease Epidemiology Collaboration   (CKD-EPI) equation.       GFR Estimate If Black   Date Value Ref Range Status   01/16/2020 >90 >60 mL/min/[1.73_m2] Final     Comment:      GFR Calc  Starting 12/18/2018, serum creatinine based estimated GFR (eGFR) will be   calculated using the Chronic Kidney Disease Epidemiology Collaboration   (CKD-EPI) equation.       Lab Results   Component Value Date    CR 1.61 05/22/2024    CR 1.01 01/16/2020     No results found for: \"MICROALBUMIN\"    Healthy Eating:  Healthy Eating Assessed Today: Yes  Cultural/Baptism diet restrictions?: No  Meal planning/habits: Other  Who cooks/prepares meals for you?: Self, Spouse  Who purchases food in  your home?: Self, Spouse  How many times a week on average do you eat food made away from home (restaurant/take-out)?: 1  Meals include: Breakfast, Lunch, Dinner, Evening Snack  Breakfast: cereal  Lunch: varies  Dinner: varies  Other: wants help with menu planning  Beverages: Water, Milk  Has patient met with a dietitian in the past?: Yes    Being Active:  Being Active Assessed Today: No  Barrier to exercise: None    Monitoring:  Monitoring Assessed Today: Yes  Blood Glucose Meter: FreeStyle  Times checking blood sugar at home (number): Never  Times checking blood sugar at home (per): Day    Taking Medications:  Diabetes Medication(s)       Biguanides       metFORMIN (GLUCOPHAGE) 1000 MG tablet TAKE 1 TABLET BY MOUTH TWICE A DAY WITH MEALS       Insulin       insulin glargine 100 UNIT/ML pen INJECT 40 UNITS SUBCUTANEOUS DAILY       Sulfonylureas       glipiZIDE (GLUCOTROL XL) 10 MG 24 hr tablet TAKE 1 TABLET (10 MG) BY MOUTH DAILY.            Taking Medication Assessed Today: Yes  Current Treatments: Insulin " Injections, Oral Medication (taken by mouth)  Problems taking diabetes medications regularly?: No    Problem Solving:  Problem Solving Assessed Today: Yes  Is the patient at risk for hypoglycemia?: Yes  Hypoglycemia Frequency: Rarely  Hypoglycemia Treatment: Other food    Reducing Risks:  Reducing Risks Assessed Today: No  Has dilated eye exam at least once a year?: Yes  Sees dentist every 6 months?: Yes  Feet checked by healthcare provider in the last year?: No    Healthy Coping:  Healthy Coping Assessed Today: Yes  Emotional response to diabetes: Concern for health and well-being  Informal Support system:: Children, Family, Spouse  Stage of change: PREPARATION (Decided to change - considering how)  Patient Activation Measure Survey Score:      1/15/2020     8:18 PM 12/16/2022     2:26 PM   SHANNON Score (Last Two)   SHANNON Raw Score 36 34   Activation Score 75.5 68.9   SHANNON Level 4 3       Care Plan and Education Provided:  Care Plan: Diabetes   Updates made by Lucía Chávez RD since 6/18/2024 12:00 AM        Problem: HbA1C Not In Goal         Goal: Establish Regular Follow-Ups with PCP         Task: Discuss with PCP the recommended timing for patient's next follow up visit(s)    Responsible User: Lucía Chávez RD        Task: Discuss schedule for PCP visits with patient    Responsible User: Lucía Chávez RD        Goal: Get HbA1C Level in Goal         Task: Educate patient on diabetes education self-management topics    Responsible User: Lucía Chávez RD        Task: Educate patient on benefits of regular glucose monitoring    Responsible User: Lucía Chávez RD        Task: Refer patient to appropriate extended care team member, as needed (Medication Therapy Management, Behavioral Health, Physical Therapy, etc.)    Responsible User: Lucía Chávez RD        Task: Discuss diabetes treatment plan with patient    Responsible User: Lucía Chávez RD        Problem: Diabetes Self-Management Education Needed  to Optimize Self-Care Behaviors         Goal: Understand diabetes pathophysiology and disease progression         Task: Provide education on diabetes pathophysiology and disease progression specfic to patient's diabetes type    Responsible User: Lucía Chávez RD        Goal: Healthy Eating - follow a healthy eating pattern for diabetes         Task: Provide education on portion control and consistency in amount, composition and timing of food intake    Responsible User: Lucía Chávez RD        Task: Provide education on managing carbohydrate intake (carbohydrate counting, plate planning method, etc.)    Responsible User: Lucía Chávez RD        Task: Provide education on weight management    Responsible User: Lucía Chávez RD        Task: Provide education on heart healthy eating    Responsible User: Lucía Chávez RD        Task: Provide education on eating out    Responsible User: Lucía Chávez RD        Task: Develop individualized healthy eating plan with patient Completed 6/18/2024   Responsible User: Lucía Chávez RD        Goal: Being Active - get regular physical activity, working up to at least 150 minutes per week         Task: Provide education on relationship of activity to glucose and precautions to take if at risk for low glucose    Responsible User: Lucía Chávez RD        Task: Discuss barriers to physical activity with patient    Responsible User: Lucía Chávez RD        Task: Develop physical activity plan with patient    Responsible User: Lucía Chávez RD        Task: Explore community resources including walking groups, assistance programs, and home videos    Responsible User: Lucía Chávez RD        Goal: Monitoring - monitor glucose and ketones as directed         Task: Provide education on blood glucose monitoring (purpose, proper technique, frequency, glucose targets, interpreting results, when to use glucose control solution, sharps disposal)    Responsible  User: Lucía Chávez RD        Task: Provide education on continuous glucose monitoring (sensor placement, use of suleman or /reader, understanding glucose trends, alerts and alarms, differences between sensor glucose and blood glucose) Completed 6/18/2024   Responsible User: Lucía Chávez RD        Task: Provide education on ketone monitoring (when to monitor, frequency, etc.)    Responsible User: Lucía Chávez RD        Goal: Taking Medication - patient is consistently taking medications as directed         Task: Provide education on action of prescribed medication, including when to take and possible side effects    Responsible User: Lucía Chávez RD        Task: Provide education on insulin and injectable diabetes medications, including administration, storage, site selection and rotation for injection sites    Responsible User: Lucía Chávez RD        Task: Discuss barriers to medication adherence with patient and provide management technique ideas as appropriate    Responsible User: Lucía Chávez RD        Task: Provide education on frequency and refill details of medications    Responsible User: Lucía Chávez RD        Goal: Problem Solving - know how to prevent and manage short-term diabetes complications         Task: Provide education on high blood glucose - causes, signs/symptoms, prevention and treatment    Responsible User: Lucía Chávez RD        Task: Provide education on low blood glucose - causes, signs/symptoms, prevention, treatment, carrying a carbohydrate source at all times, and medical identification Completed 6/18/2024   Responsible User: Lucía Chávez RD        Task: Provide education on safe travel with diabetes    Responsible User: Lucía Chávez RD        Task: Provide education on how to care for diabetes on sick days    Responsible User: Lucía Chávez RD        Task: Provide education on when to call a health care provider    Responsible User: Kyler  Lucía WINKLER RD        Goal: Reducing Risks - know how to prevent and treat long-term diabetes complications         Task: Provide education on major complications of diabetes, prevention, early diagnostic measures and treatment of complications    Responsible User: Lucía Chávez RD        Task: Provide education on recommended care for dental, eye and foot health    Responsible User: Lucía Chávez RD        Task: Provide education on Hemoglobin A1c - goals and relationship to blood glucose levels    Responsible User: Lucía Chávez RD        Task: Provide education on recommendations for heart health - lipid levels and goals, blood pressure and goals, and aspirin therapy, if indicated    Responsible User: Lucía Chávez RD        Task: Provide education on tobacco cessation    Responsible User: Lucía Chávez RD        Goal: Healthy Coping - use available resources to cope with the challenges of managing diabetes         Task: Discuss recognizing feelings about having diabetes    Responsible User: Lucía Chávez RD        Task: Provide education on the benefits of making appropriate lifestyle changes Completed 6/18/2024   Responsible User: Lucía Chávez RD        Task: Provide education on benefits of utilizing support systems    Responsible User: Lucía Chávez RD        Task: Discuss methods for coping with stress    Responsible User: Lucía Chávez RD        Task: Provide education on when to seek professional counseling    Responsible User: Lucía Chávez RD Mary Spencer RD, [place]  Diabetes     Time Spent: 35 minutes  Encounter Type: Individual    Any diabetes medication dose changes were made via the CDE Protocol per the patient's endocrinology provider. A copy of this encounter was shared with the provider.

## 2024-06-18 NOTE — PROGRESS NOTES
Virtual Visit Details    Type of service:  Video Visit   Video Start Time: 2:15 PM  Video End Time:2:33 PM    Originating Location (pt. Location): Home    Distant Location (provider location):  Off-site  Platform used for Video Visit: [facility] Psychiatry Consult Note    IDENTIFICATION   Name: Steve Morgan   : 1960/64 year old      Sex:    @ male          Telemedicine Visit: The patient's condition can be safely assessed and treated via synchronous audio and visual telemedicine encounter.        Face to Face/patient Contact total time: 18 minutes  Pre Charting time: 2 minutes; Post charting time, communication and other activities: 1 minutes; Total time 21 minutes  2:13 PM           SUBJECTIVE     History of Present Illness    The patient, Caio Morgan, a 64-year-old male, reported improvements in his overall mental health, with a noted decrease in irritability. However, he continues to struggle with symptoms of depression and anxiety. The patient has a history of knee infection, but it is unclear if this is currently causing any issues.    The patient has been on various medications in the past, including citalopram and Zoloft, both of which were discontinued due to concerns about low sodium levels. The patient's sodium levels have been consistently low, with a noted drop to 126 in 2014 and 129 in . The patient's sodium levels were also low in  and , but it is unclear if he was on any medication at that time.    The patient's current medication regimen includes Mirtazapine. The provider discussed the possibility of adding Buspirone or Lexapro to the patient's treatment plan, but there were concerns about potential side effects, including nausea, dizziness, and palpitations. There were also concerns about the potential impact on the patient's kidney function and sodium levels.    The provider noted that Viibryd could potentially cause low sodium levels, as well as  "side effects such as nausea, diarrhea, constipation, headaches, lightheadedness, and dizziness.    The patient did not express any specific concerns or fears about the proposed changes to his treatment plan. He did not report any functional or emotional impairments, nor did he mention any significant events or traumas. The patient did not mention any dreams or nightmares.    The patient's mood and behavior during the consultation were not explicitly described, but the provider noted that the patient seemed to be doing better overall. The patient did not report any specific triggers for his symptoms, nor did he mention any risk, alleviating, or aggravating factors.             The following assessments were completed by patient for this visit:  PROMIS 10-Global Health (only subscores and total score):       5/15/2023    10:44 AM 5/21/2023     4:35 PM 8/23/2023     2:16 PM 11/16/2023    10:10 AM 12/1/2023     4:00 PM 3/12/2024    10:26 AM 4/10/2024     2:15 PM   PROMIS-10 Scores Only   Global Mental Health Score 9 10 11 9 8 7 10    10   Global Physical Health Score 12 13 15 12 12 11 13    13   PROMIS TOTAL - SUBSCORES 21 23 26 21 20 18 23    23             4/10/2024     2:12 PM 5/1/2024    10:17 AM 6/18/2024     8:48 AM   PHQ   PHQ-9 Total Score 17 9 11   Q9: Thoughts of better off dead/self-harm past 2 weeks Several days Not at all Not at all   F/U: Thoughts of suicide or self-harm No     F/U: Safety concerns No            3/10/2023     1:48 PM 6/30/2023     8:21 AM 3/12/2024    10:24 AM   JOSE MANUEL-7 SCORE   Total Score 5 (mild anxiety) 3 (minimal anxiety) 6 (mild anxiety)   Total Score 5 3 6        OBJECTIVE     Vital Signs:   Ht 1.651 m (5' 5\")   Wt 113.4 kg (250 lb)   BMI 41.60 kg/m      Labs:    Results    - Sodium levels in 2010 were 134 (normal)  - Sodium levels in 2011 were 128  - Sodium levels in 2014 were 126  - Sodium levels in 2015 were 129               Current Medications:  Current Outpatient Medications "   Medication Sig Dispense Refill    mirtazapine (REMERON) 15 MG tablet Take 1 tablet (15 mg) by mouth at bedtime 30 tablet 2    mirtazapine (REMERON) 45 MG tablet Take 1 tablet (45 mg) by mouth at bedtime Take along with a 15 mg tablet for a total of 60 mg nightly 30 tablet 2    vilazodone (VIIBRYD) 10 MG TABS tablet Take 1 tablet (10 mg) by mouth daily 30 tablet 1    acetaminophen (TYLENOL) 325 MG tablet Take 2 tablets (650 mg) by mouth every 4 hours as needed for other (For optimal non-opioid multimodal pain management to improve pain control.)      aspirin 81 MG EC tablet Take 1 tablet (81 mg) by mouth 2 times daily 60 tablet 0    atenolol (TENORMIN) 25 MG tablet TAKE 1 TABLET BY MOUTH EVERY DAY 90 tablet 1    buPROPion (WELLBUTRIN XL) 150 MG 24 hr tablet Take 1 tablet (150 mg) by mouth every morning 90 tablet 1    Calcium Carb-Cholecalciferol (CALCIUM 600 + D PO) Take 1 tablet by mouth daily      Continuous Blood Gluc  (FREESTYLE GIULIANA 2 READER) SIENA 1 Device continuous 1 each 0    Continuous Glucose Sensor (FREESTYLE GIULIANA 2 SENSOR) MISC 1 EACH EVERY 14 DAYS USE 1 SENSOR EVERY 14 DAYS. USE TO READ BLOOD SUGARS PER 'S INSTRUCTIONS. 2 each 0    famotidine (PEPCID) 40 MG tablet TAKE 1 TABLET BY MOUTH EVERY DAY 90 tablet 1    ferrous sulfate 325 (65 FE) MG tablet Take 1 tablet by mouth daily (with breakfast).      finasteride (PROSCAR) 5 MG tablet Take 5 mg by mouth at bedtime      gabapentin (NEURONTIN) 300 MG capsule Take 1 capsule (300 mg) by mouth 3 times daily for 5 days 15 capsule 0    glipiZIDE (GLUCOTROL XL) 10 MG 24 hr tablet TAKE 1 TABLET (10 MG) BY MOUTH DAILY. 90 tablet 7    insulin glargine 100 UNIT/ML pen INJECT 40 UNITS SUBCUTANEOUS DAILY 15 mL 0    insulin pen needle (B-D U/F) 31G X 5 MM miscellaneous USE 4 DAILY OR AS DIRECTED. 400 each 0    latanoprost (XALATAN) 0.005 % ophthalmic solution INSTILL 1 DROP INTO BOTH EYES IN THE EVENING      LEVOXYL 137 MCG tablet TAKE 1 TABLET BY  "MOUTH EVERY DAY 30 tablet 2    losartan (COZAAR) 100 MG tablet Take 1 tablet (100 mg) by mouth daily 90 tablet 1    metFORMIN (GLUCOPHAGE) 1000 MG tablet TAKE 1 TABLET BY MOUTH TWICE A DAY WITH MEALS 180 tablet 0    MULTI-VITAMIN OR TABS Take 1 tablet by mouth daily 30 0    oxyCODONE (ROXICODONE) 5 MG tablet Take 1-2 tablets (5-10 mg) by mouth every 4 hours as needed for moderate to severe pain 1 tab po q 4 hrs prn pain scale \"3-6\"  2 tabs po q 4 hrs prn pain scale \"7-10\" 20 tablet 0    pantoprazole (PROTONIX) 40 MG EC tablet Take 1 tablet (40 mg) by mouth daily as needed for heartburn 90 tablet 1    senna-docusate (SENOKOT-S/PERICOLACE) 8.6-50 MG tablet Take 1 tablet by mouth 2 times daily as needed for constipation 20 tablet 0    simvastatin (ZOCOR) 20 MG tablet Take 20 mg by mouth daily       No current facility-administered medications for this visit.            ADDED HISTORY       Physical exam    Physical Exam    - Anxiety, depression, affect:  - Speech and language:  - Insight and judgment:  - Alert and orientation to person, place and situation:  - SI: None  - HI: None  - AH: None  - VH: None           MENTAL STATUS EXAMINATION:   Appearance: intact attention to grooming and hygiene  Attitude:  cooperative   Eye Contact:  good  Gait and Station: sitting  Psychomotor Behavior: Within normal limits  Oriented to:  Grossly person place and time  Attention Span and Concentration:  Grossly intact  Speech:  normal tone  Language: english  Mood: Fair  Affect: Mildly constricted  Associations:  no loose associations  Thought Process:  logical, linear and goal oriented  Thought Content:  No evidence of delusions or suicidal or homicidal ideation plan or intent  Memory: grossly intact  Fund of Knowledge: Good  Insight:  good  Judgment:  intact, adequate for safety  Impulse Control:  intact        DIAGNOSES:   Major Depressive Disorder, moderate, recurrent  SIADH/hyponatremia      ASSESSMENT:   Patient with history of " depression, and considerable mood difficulties without prior combo of citalopram/bupropion affecting relationships negatively. Has comorbid diagnoses including SIADH/hyponatermia (possibly worse with selective serotonin reuptake inhibitor) historically, DM, obstructive sleep apnea, b12 deficiency.     Per literature review mirtazapine is less likely to cause hyponatremia, and for current eGFR no dosage necessary. Will pursue.    Today Steve Morgan reports no suicidal ideations. In addition, he has notable risk factors for self-harm, including age and comorbid medical conditions. However, risk is mitigated by commitment to family, Sabianist beliefs and absence of past attempts. Therefore, based on all available evidence including the factors cited above, he does not appear to be at imminent risk for self-harm, does not meet criteria for a 72-hr hold, and therefore remains appropriate for ongoing outpatient level of care.       PLAN:     Patient advised of consultative model. Patient will continue to be seen for ongoing consultation and stabilization.  Iterated consult model  Does not meet criteria for involuntary treatment or hospitalization  Add mirtazapine 5/25/23 7.5 mg po at bedtime x 3 nights then 15 mg po at bedtime slight benefit for anger=> 7/27/23 30 mg at bedtime efficacy partially, difficulty with irritability => 11/16/23 45 mg po at bedtime partial benefits => 2/27/24 60 mg at bedtime no effects-Risks, benefits and alternatives discussed.  Patient provides verbal consent to treatment.  restart bupropion 11/16/23 xl 150 mg daily-Risks, benefits and alternatives discussed.  Patient provides verbal consent to treatment. With renal disease avoid 300 mg can consider 200 mg/24 h  Add vilazodone 6/18/24 10 mg daily -Risks, benefits and alternatives discussed.  Patient provides verbal consent to treatment.  Discussed hyponatremia considerations.  Consider buspirone, lower doses  DC'd citalopram  (hyponatremia/SIADH)  Labs - reviewed; follow-up CMP in august  Referred for MTM referral, priority on guidance for psychotropic selection and risk of hyponatremia  Therapy with JULIANE Oropezakila; switching to long term therapy with ISAIAH Hicks   Anger management course - to start June or July  [facility] to send DBT resources to him      Assessment & Plan     Assessment  - Struggle with finding a medication that can help with mental health symptoms without causing side effects  - Considering adding another medication, Viibryd, but need to monitor sodium levels    Plan  - Start Viibryd at low dose of 10mg/day  - Monitor for side effects such as nausea, diarrhea, constipation, headaches, lightheadedness, dizziness  - Check sodium levels in two months  - Virtual visit in six weeks    Prescription  Viibryd, 10mg/day, to be taken in the morning    Appointments  - Lab appointment in two months to check sodium levels  - Virtual visit in six weeks           Administrative Billing:   Time spent with patient was greater than 50% of time and/or significant time was spent in counseling and coordination of care regarding above diagnoses and treatment plan. Pre charting time and post charting time/documentation/coordination are done on date of service.      Signed:   Anant Pinto M.D.  [facility] Psychiatry Service    Disclaimer: This note consists of symbols derived from keyboarding, dictation and/or voice recognition software. As a result, there may be errors in the script that have gone undetected. Please consider this when interpreting information found in this chart.    Consent was obtained from the patient to use an AI documentation tool in the creation of this note.     eoc

## 2024-06-18 NOTE — NURSING NOTE
Is the patient currently in the state of [place]? YES    Visit mode:VIDEO    If the visit is dropped, the patient can be reconnected by: VIDEO VISIT: Send to e-mail at: [email or web address]    Will anyone else be joining the visit? NO  (If patient encounters technical issues they should call [phone]    How would you like to obtain your AVS? MyChart    Are changes needed to the allergy or medication list? No    Are refills needed on medications prescribed by this physician? YES    Reason for visit: RECHECK    Kassie GORDILLO

## 2024-06-25 ENCOUNTER — TELEPHONE (OUTPATIENT)
Dept: PSYCHIATRY | Facility: CLINIC | Age: 64
End: 2024-06-25
Payer: MEDICARE

## 2024-06-25 ENCOUNTER — TELEPHONE (OUTPATIENT)
Dept: FAMILY MEDICINE | Facility: CLINIC | Age: 64
End: 2024-06-25
Payer: MEDICARE

## 2024-06-25 NOTE — TELEPHONE ENCOUNTER
6/25/24: Reason for call:  Other   Patient called regarding (reason for call): call back  Additional comments: Pt stated that he was prescribed a medication that is not covered, vilazodone (VIIBRYD) 10 MG TABS tablet. Pt reported that pharmacy sent a message to provider, but has not heard back yet. Pt is concerned, and is requesting that care team get back to him.    Phone number to reach patient:  Cell number on file:    Telephone Information:   Mobile [phone]       Best Time:  ASAP    Can we leave a detailed message on this number?  YES    Travel screening: Not Applicable

## 2024-06-25 NOTE — TELEPHONE ENCOUNTER
Routing back to Margaret Schuster LPN [facility]     Please see approval below from Lisa Pierre PA-C  for requested PT orders     Thank you   Marcela Blackburn, Registered Nurse  [facility]

## 2024-06-25 NOTE — TELEPHONE ENCOUNTER
2 PT VISITS OVER THE NEXT 3 WEEKS FOR Completed reassessment today and patient is making progress will benefit from continued therapy to reach all goals.  Please approve the above.  Thank you,  Margaret Schuster LPN  [facility]

## 2024-06-25 NOTE — TELEPHONE ENCOUNTER
Prior Authorization Retail Medication Request    Medication/Dose: vilazodone (VIIBRYD) 10 MG TABS tablet    Diagnosis and ICD code (if different than what is on RX):    New/renewal/insurance change PA/secondary ins. PA:  Previously Tried and Failed:  citalopram, mirtazapine, bupropion,   Rationale:  hyponatremia/SIADH    Insurance   Primary: BC/BS supplement  Insurance ID:  [id number]  BIN: [id number] Group: [id number]  [phone]    Secondary (if applicable):    Insurance ID:        Pharmacy Information (if different than what is on RX)  [facility]  [facility] [address] [place] [id number] [address]   Phone:  [phone]   Fax:    [phone]

## 2024-06-25 NOTE — TELEPHONE ENCOUNTER
Prior Authorization Not Needed per Insurance    Medication: vilazodone (VIIBRYD) 10 MG TABS tablet- NOT NEEDED  is covered under patients formulary plan.  Pharmacy billed claim to the insurance insurance plan (supplemental plan). Provided pharmacy with pt's correct insurance information and received a paid claim.  Patient will be notified once med is ready for .      Pharmacy Notified:  Yes  Patient Notified:  No    Please close encounter when finished.    Thank you,  Central Prior Authorization Team  [phone]

## 2024-06-25 NOTE — TELEPHONE ENCOUNTER
Called patient and told them that we hadn't heard from the pharmacy about this PA. I did call pharmacy and received information and PA request was sent to PA team.     Told patient that the PA's were taking a few weeks to complete.     Will forward to provider to see if alternative would want to be prescribed or wait for PA outcome.     Fabienne Jones RN on 6/25/2024 at 3:44 PM

## 2024-06-26 NOTE — TELEPHONE ENCOUNTER
1) Called [facility] in [place] to confirm that the Viibryd is covered by insurance. The medication is covered and the pharmacy will get it ready for him today.     2) Called the patient and advised that the pharmacy plans to get the Viibryd ready today. He verbalized understanding.       ANTONIA HARRIS RN on 6/26/2024 at 9:06 AM

## 2024-06-27 ENCOUNTER — TELEPHONE (OUTPATIENT)
Dept: FAMILY MEDICINE | Facility: CLINIC | Age: 64
End: 2024-06-27

## 2024-06-27 NOTE — TELEPHONE ENCOUNTER
FOCUS OF CARE: ADL/IADL, COGNITION  DISCIPLINE: SLP, OT  SPECIFIC TREATMENT ORDERS:   OCCUPATIONAL THERAPY HOME CARE SERVICES FOLLOWING HOSPITALIZATION FOR RT KNEE INFECTION 2 VISITS WITHIN 3 WEEKS WITH PLANNED RECERT FOR ADL AND IADL RETRAINING, DME NEEDS, HAND ROM,  STRENGTH, BUE HEP, BLE SWELLING, HOME SAFETY AND FALL PREVENTION  Please approve the above.  Thank you,  Margaret Schutser LPN  [facility]

## 2024-06-28 NOTE — TELEPHONE ENCOUNTER
Prior Authorization Not Needed per Insurance    Medication: vilazodone (VIIBRYD) 10 MG TABS tablet  Insurance Company: [employer] - Phone [phone] Fax [phone]  Expected CoPay:      Pharmacy Filling the Rx: [facility] [address] [place] [id number] [facility]  Pharmacy Notified:  yes  Patient Notified:  yes- Pharmacy will contact patient when ready to /ship      PA not needed, medication is [facility].  Per call to pharmacy this was filled for patient on 6/26/2024.  No further action needed

## 2024-06-28 NOTE — TELEPHONE ENCOUNTER
Central Prior Authorization Team   Phone: [phone]    PA Initiation    Medication: vilazodone (VIIBRYD) 10 MG TABS tablet  Insurance Company: [employer] - Phone [phone] Fax [phone]  Pharmacy Filling the Rx: [facility] [address] [place] [address] - [id number] [address]  Filling Pharmacy Phone: [phone]  Filling Pharmacy Fax:    Start Date: 6/28/2024

## 2024-07-02 ENCOUNTER — TELEPHONE (OUTPATIENT)
Dept: FAMILY MEDICINE | Facility: CLINIC | Age: 64
End: 2024-07-02
Payer: MEDICARE

## 2024-07-02 NOTE — TELEPHONE ENCOUNTER
SLP followup orders 1 visit weekly for 2 weeks to address short term memory and cognition strategies to maximize independence    Please approve the above.  Thank you,  Margaret Schuster LPN  [facility]

## 2024-07-02 NOTE — TELEPHONE ENCOUNTER
Routing back to Margaret Schuster LPN with [facility]     Marcela Blackburn, Registered Nurse  [facility]

## 2024-07-05 ENCOUNTER — LAB (OUTPATIENT)
Dept: LAB | Facility: CLINIC | Age: 64
End: 2024-07-05
Payer: MEDICARE

## 2024-07-05 DIAGNOSIS — F33.1 MAJOR DEPRESSIVE DISORDER, RECURRENT EPISODE, MODERATE (H): ICD-10-CM

## 2024-07-05 DIAGNOSIS — N18.32 STAGE 3B CHRONIC KIDNEY DISEASE (H): ICD-10-CM

## 2024-07-05 LAB
ALBUMIN SERPL BCG-MCNC: 4.1 G/DL (ref 3.5–5.2)
ALP SERPL-CCNC: 76 U/L (ref 40–150)
ALT SERPL W P-5'-P-CCNC: 19 U/L (ref 0–70)
ANION GAP SERPL CALCULATED.3IONS-SCNC: 10 MMOL/L (ref 7–15)
AST SERPL W P-5'-P-CCNC: 24 U/L (ref 0–45)
BILIRUB SERPL-MCNC: 0.2 MG/DL
BUN SERPL-MCNC: 23.3 MG/DL (ref 8–23)
CALCIUM SERPL-MCNC: 9.4 MG/DL (ref 8.8–10.2)
CHLORIDE SERPL-SCNC: 98 MMOL/L (ref 98–107)
CREAT SERPL-MCNC: 1.72 MG/DL (ref 0.67–1.17)
DEPRECATED HCO3 PLAS-SCNC: 22 MMOL/L (ref 22–29)
EGFRCR SERPLBLD CKD-EPI 2021: 43 ML/MIN/1.73M2
GLUCOSE SERPL-MCNC: 248 MG/DL (ref 70–99)
POTASSIUM SERPL-SCNC: 5.2 MMOL/L (ref 3.4–5.3)
PROT SERPL-MCNC: 7.3 G/DL (ref 6.4–8.3)
SODIUM SERPL-SCNC: 130 MMOL/L (ref 135–145)

## 2024-07-05 PROCEDURE — 36415 COLL VENOUS BLD VENIPUNCTURE: CPT

## 2024-07-05 PROCEDURE — 80053 COMPREHEN METABOLIC PANEL: CPT

## 2024-07-05 SDOH — HEALTH STABILITY: PHYSICAL HEALTH: ON AVERAGE, HOW MANY MINUTES DO YOU ENGAGE IN EXERCISE AT THIS LEVEL?: 0 MIN

## 2024-07-05 SDOH — HEALTH STABILITY: PHYSICAL HEALTH: ON AVERAGE, HOW MANY DAYS PER WEEK DO YOU ENGAGE IN MODERATE TO STRENUOUS EXERCISE (LIKE A BRISK WALK)?: 0 DAYS

## 2024-07-09 ENCOUNTER — TELEPHONE (OUTPATIENT)
Dept: FAMILY MEDICINE | Facility: CLINIC | Age: 64
End: 2024-07-09

## 2024-07-09 ENCOUNTER — OFFICE VISIT (OUTPATIENT)
Dept: FAMILY MEDICINE | Facility: CLINIC | Age: 64
End: 2024-07-09
Payer: MEDICARE

## 2024-07-09 VITALS
WEIGHT: 241.2 LBS | DIASTOLIC BLOOD PRESSURE: 64 MMHG | SYSTOLIC BLOOD PRESSURE: 126 MMHG | BODY MASS INDEX: 38.76 KG/M2 | HEIGHT: 66 IN | OXYGEN SATURATION: 99 % | TEMPERATURE: 97.6 F | RESPIRATION RATE: 12 BRPM | HEART RATE: 73 BPM

## 2024-07-09 DIAGNOSIS — L98.491 SKIN ULCER OF HAND, LIMITED TO BREAKDOWN OF SKIN (H): ICD-10-CM

## 2024-07-09 DIAGNOSIS — N18.30 STAGE 3 CHRONIC KIDNEY DISEASE, UNSPECIFIED WHETHER STAGE 3A OR 3B CKD (H): ICD-10-CM

## 2024-07-09 DIAGNOSIS — E22.2 SYNDROME OF INAPPROPRIATE SECRETION OF ANTIDIURETIC HORMONE (H): ICD-10-CM

## 2024-07-09 DIAGNOSIS — M24.549 CONTRACTURE OF HAND: ICD-10-CM

## 2024-07-09 DIAGNOSIS — Z12.5 SCREENING FOR PROSTATE CANCER: ICD-10-CM

## 2024-07-09 DIAGNOSIS — I73.9 PVD (PERIPHERAL VASCULAR DISEASE) (H): ICD-10-CM

## 2024-07-09 DIAGNOSIS — R26.81 UNSTEADY GAIT: ICD-10-CM

## 2024-07-09 DIAGNOSIS — Z79.4 TYPE 2 DIABETES MELLITUS WITH DIABETIC NEUROPATHY, WITH LONG-TERM CURRENT USE OF INSULIN (H): ICD-10-CM

## 2024-07-09 DIAGNOSIS — M86.9 FINGER OSTEOMYELITIS, RIGHT (H): ICD-10-CM

## 2024-07-09 DIAGNOSIS — E11.40 TYPE 2 DIABETES MELLITUS WITH DIABETIC NEUROPATHY, WITH LONG-TERM CURRENT USE OF INSULIN (H): ICD-10-CM

## 2024-07-09 DIAGNOSIS — Z00.00 ENCOUNTER FOR MEDICARE ANNUAL WELLNESS EXAM: Primary | ICD-10-CM

## 2024-07-09 DIAGNOSIS — G60.9 IDIOPATHIC PERIPHERAL NEUROPATHY: ICD-10-CM

## 2024-07-09 DIAGNOSIS — E66.01 MORBID OBESITY (H): ICD-10-CM

## 2024-07-09 LAB — HBA1C MFR BLD: 7.6 % (ref 0–5.6)

## 2024-07-09 PROCEDURE — G0439 PPPS, SUBSEQ VISIT: HCPCS | Performed by: PHYSICIAN ASSISTANT

## 2024-07-09 PROCEDURE — 36415 COLL VENOUS BLD VENIPUNCTURE: CPT | Performed by: PHYSICIAN ASSISTANT

## 2024-07-09 PROCEDURE — 99207 PR FOOT EXAM NO CHARGE: CPT | Performed by: PHYSICIAN ASSISTANT

## 2024-07-09 PROCEDURE — 99214 OFFICE O/P EST MOD 30 MIN: CPT | Mod: 25 | Performed by: PHYSICIAN ASSISTANT

## 2024-07-09 PROCEDURE — 83036 HEMOGLOBIN GLYCOSYLATED A1C: CPT | Performed by: PHYSICIAN ASSISTANT

## 2024-07-09 RX ORDER — GABAPENTIN 300 MG/1
300 CAPSULE ORAL 3 TIMES DAILY
Qty: 270 CAPSULE | Refills: 1 | Status: SHIPPED | OUTPATIENT
Start: 2024-07-09

## 2024-07-09 RX ORDER — INSULIN GLARGINE 100 [IU]/ML
40 INJECTION, SOLUTION SUBCUTANEOUS DAILY
Qty: 15 ML | Refills: 0 | Status: SHIPPED | OUTPATIENT
Start: 2024-07-09 | End: 2024-08-13

## 2024-07-09 RX ORDER — BUPROPION HYDROCHLORIDE 300 MG/1
300 TABLET ORAL EVERY MORNING
COMMUNITY
Start: 2024-01-12 | End: 2024-07-09

## 2024-07-09 ASSESSMENT — ANXIETY QUESTIONNAIRES
7. FEELING AFRAID AS IF SOMETHING AWFUL MIGHT HAPPEN: NOT AT ALL
1. FEELING NERVOUS, ANXIOUS, OR ON EDGE: NOT AT ALL
5. BEING SO RESTLESS THAT IT IS HARD TO SIT STILL: MORE THAN HALF THE DAYS
7. FEELING AFRAID AS IF SOMETHING AWFUL MIGHT HAPPEN: NOT AT ALL
GAD7 TOTAL SCORE: 7
GAD7 TOTAL SCORE: 7
3. WORRYING TOO MUCH ABOUT DIFFERENT THINGS: MORE THAN HALF THE DAYS
4. TROUBLE RELAXING: NOT AT ALL
2. NOT BEING ABLE TO STOP OR CONTROL WORRYING: NOT AT ALL
GAD7 TOTAL SCORE: 7
IF YOU CHECKED OFF ANY PROBLEMS ON THIS QUESTIONNAIRE, HOW DIFFICULT HAVE THESE PROBLEMS MADE IT FOR YOU TO DO YOUR WORK, TAKE CARE OF THINGS AT HOME, OR GET ALONG WITH OTHER PEOPLE: SOMEWHAT DIFFICULT
6. BECOMING EASILY ANNOYED OR IRRITABLE: NEARLY EVERY DAY
8. IF YOU CHECKED OFF ANY PROBLEMS, HOW DIFFICULT HAVE THESE MADE IT FOR YOU TO DO YOUR WORK, TAKE CARE OF THINGS AT HOME, OR GET ALONG WITH OTHER PEOPLE?: SOMEWHAT DIFFICULT

## 2024-07-09 ASSESSMENT — ACTIVITIES OF DAILY LIVING (ADL): CURRENT_FUNCTION: NO ASSISTANCE NEEDED

## 2024-07-09 NOTE — PROGRESS NOTES
DME (Durable Medical Equipment) Orders and Documentation   Hand orthotics, compression stocking  The patient was assessed and it was determined the patient is in need of the following listed DME Supplies/Equipment. Please complete supporting documentation below to demonstrate medical necessity.      DME All Other Item(s) Documentation    List reason for need and supporting documentation for medical necessity below for each DME item.     1. Type 2 diabetes with diabetic neuropathy with longterm use of insulin  2. Idopathic peripheral neuropathy  3. Peripheral vascular disease  4. Contracture of hands  5. Unsteady gait

## 2024-07-09 NOTE — TELEPHONE ENCOUNTER
Faxed to [facility] @ [phone] with printed OV notes from 7/9/2024  Nanci Paige TC

## 2024-07-09 NOTE — PROGRESS NOTES
Preventive Care Visit  [facility]  Lisa Pierre PA-C, Family Medicine  Jul 9, 2024      Assessment & Plan     (Z00.00) Encounter for Medicare annual wellness exam  (primary encounter diagnosis)  Comment:   Plan: [place] ANNU[place] WELL[place] VISIT, PPS, SUBSEQUENT            (E11.40,  Z79.4) Type 2 diabetes mellitus with diabetic neuropathy, with long-term current use of insulin (H)  Comment: Await labs.  Continue to follow-up with endocrinology.  Plan: FOOT EXAM, Hemoglobin A1c, gabapentin         (NEURONTIN) 300 MG capsule, insulin glargine         100 UNIT/ML pen, Compression Sleeve/Stocking         Order, OFFICE/OUTPT VISIT,EST,LEVL IV            (E66.01) Morbid obesity (H)  Comment: Continue to monitor diet try to work on increase movement.  This will help with diabetes discussed with patient in office.  Plan: OFFICE/OUTPT VISIT,EST,LEVL IV            (L98.491) Skin ulcer of hand, limited to breakdown of skin (H)  Comment: Ulcer is almost completely resolved.  Plan: OFFICE/OUTPT VISIT,EST,LEVL IV            (M86.9) Finger osteomyelitis, right (H)  Comment: Resolved.  Plan: OFFICE/OUTPT VISIT,EST,LEVL IV            (N18.30) Stage 3 chronic kidney disease, unspecified whether stage 3a or 3b CKD (H)  Comment: Continues current medications follows with nephrology.  Plan: OFFICE/OUTPT VISIT,EST,LEVL IV          (E22.2) Syndrome of inappropriate secretion of antidiuretic hormone (H24)  Comment: Continues with low sodium.  Continues to follow-up with nephrology  Plan: OFFICE/OUTPT VISIT,EST,LEVL IV            (I73.9) PVD (peripheral vascular disease) (H24)  Comment:   Plan: Compression Sleeve/Stocking Order, OFFICE/OUTPT        VISIT,EST,LEVL IV        Compression stocking order will be faxed to [facility] in [place] along with office visit notes.  Patient sees benefit  vascular symptoms with stockings.    (G60.9) Idiopathic peripheral neuropathy  Comment: Will restart gabapentin.  Will  "fax order for compression stockings to [facility] for neuropathy.  Patient sees benefit with these stockings.  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  Will also order orthotics for hand and wrist supports.  Plan: Adult Rheumatology  Referral,         Orthotics, Mastectomy and Custom Compression         Orders, OFFICE/OUTPT VISIT,EST,LEVL IV            (R26.81) Unsteady gait  Comment: Patient continues to use cane and continues with physical therapy.  Unsteady gait is secondary to neuropathy.  Plan: Compression Sleeve/Stocking Order, OFFICE/OUTPT        VISIT,EST,LEVL IV            (Z12.5) Screening for prostate cancer  Comment:   Plan: PSA, screen            Patient has been advised of split billing requirements and indicates understanding: No        BMI  Estimated body mass index is 39.53 kg/m  as calculated from the following:    Height as of this encounter: 1.664 m (5' 5.5\").    Weight as of this encounter: 109.4 kg (241 lb 3.2 oz).   Weight management plan: Discussed healthy diet and exercise guidelines    Counseling  Appropriate preventive services were discussed with this patient, including applicable screening as appropriate for fall prevention, nutrition, physical activity, Tobacco-use cessation, weight loss and cognition.  Checklist reviewing preventive services available has been given to the patient.  Reviewed patient's diet, addressing concerns and/or questions.   He is at risk for psychosocial distress and has been provided with information to reduce risk.   The patient's PHQ-9 score is consistent with moderate depression. He was provided with information regarding depression.       See Patient Instructions    Odalis Kearney is a 64 year old, presenting for the following:  Medicare Visit        7/9/2024     9:31 AM   Additional Questions " "  Roomed by AT         Health Care Directive  Patient does not have a Health Care Directive or Living Will: Discussed advance care planning with patient; information given to patient to review.    Healthy Habits:     In general, how would you rate your overall health?  Fair    Frequency of exercise:  None    Duration of exercise:  Less than 15 minutes    Do you usually eat at least 4 servings of fruit and vegetables a day, include whole grains    & fiber and avoid regularly eating high fat or \"junk\" foods?  No    Taking medications regularly:  Yes    Barriers to taking medications:  None    Medication side effects:  None    Ability to successfully perform activities of daily living:  No assistance needed    Home Safety:  No safety concerns identified    Hearing Impairment:  No hearing concerns    In the past 6 months, have you been bothered by leaking of urine?  No    In general, how would you rate your overall mental or emotional health?  Good    Additional concerns today:  Yes    Patient has contractures of both hands and all fingers.  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  Patient has significant peripheral neuropathy both idiopathic and thought to be caused by diabetes.  He has been working with physical therapy for his gait and Occupational Therapy.  He states that he is interested in possibly seeing rheumatology given the worsening contractures as well as having or orthotics made to help to support hands and straighten his fingers while sleeping.    Patient also needs new order for compression stockings for both of his legs.  He wears knee-high stockings for peripheral vascular disease as well as peripheral neuropathy.  He sees good results while wearing the stockings.      Diabetes Follow-up    How often are you checking your blood sugar? Three times daily  Blood sugar testing frequency justification:  Uncontrolled diabetes  What time of day are you checking your blood sugars (select " all that apply)?  Before and after meals  Have you had any blood sugars above 200?  No  Have you had any blood sugars below 70?  No  What symptoms do you notice when your blood sugar is low?  Shaky and Dizzy  What concerns do you have today about your diabetes? Getting infections often   Do you have any of these symptoms? (Select all that apply)  Numbness in feet and Weight gain  Have you had a diabetic eye exam in the last 12 months? Yes- Date of last eye exam: March 2024,  Location: [facility]        BP Readings from Last 2 Encounters:   07/09/24 126/64   05/22/24 (!) 170/81     Hemoglobin A1C (%)   Date Value   07/09/2024 7.6 (H)   05/14/2024 8.0 (H)   02/22/2021 7.4 (H)   03/12/2020 9.9 (H)     LDL Cholesterol Calculated (mg/dL)   Date Value   02/26/2024 130 (H)   01/27/2023 71   03/05/2020 88   01/16/2020 97             Vascular Disease Follow-up    How often do you take nitroglycerin? Never  Do you take an aspirin every day? Yes    Chronic Kidney Disease Follow-up    Do you take any over the counter pain medicine?: No      7/5/2024   General Health   How would you rate your overall physical health? (!) FAIR   Feel stress (tense, anxious, or unable to sleep) Only a little      (!) STRESS CONCERN      7/5/2024   Nutrition   Diet: Regular (no restrictions)            7/5/2024   Exercise   Days per week of moderate/strenous exercise 0 days   Average minutes spent exercising at this level 0 min      (!) EXERCISE CONCERN      7/5/2024   Social Factors   Worry food won't last until get money to buy more No   Food not last or not have enough money for food? No   Do you have housing? (Housing is defined as stable permanent housing and does not include staying ouside in a car, in a tent, in an abandoned building, in an overnight shelter, or couch-surfing.) Yes   Are you worried about losing your housing? No   Lack of transportation? No   Unable to get utilities (heat,electricity)? No            7/9/2024   Fall  Risk   Gait Speed Test (Document in seconds) 7.72   Gait Speed Test Interpretation Greater than 5.01 seconds - ABNORMAL               7/5/2024   Activities of Daily Living- Home Safety   Needs help with the following daily activites None of the above   Safety concerns in the home None of the above            7/5/2024   Dental   Dentist two times every year? Yes            7/5/2024   Hearing Screening   Hearing concerns? None of the above            7/5/2024   Driving Risk Screening   Patient/family members have concerns about driving No            7/5/2024   General Alertness/Fatigue Screening   Have you been more tired than usual lately? No            7/5/2024   Urinary Incontinence Screening   Bothered by leaking urine in past 6 months No               Today's PHQ-9 Score:       7/9/2024     9:38 AM   PHQ-9 SCORE   PHQ-9 Total Score MyChart 16 (Moderately severe depression)   PHQ-9 Total Score 16           7/5/2024   Substance Use   Alcohol more than 3/day or more than 7/wk No   Do you have a current opioid prescription? No   How severe/bad is pain from 1 to 10? 0/10 (No Pain)   Do you use any other substances recreationally? No        Social History     Tobacco Use    Smoking status: Never    Smokeless tobacco: Never    Tobacco comments:     Smoked for about 6 months when I was 16 but haven't touched them since   Vaping Use    Vaping status: Never Used   Substance Use Topics    Alcohol use: Yes     Comment: Occassionally    Drug use: Never           7/5/2024   AAA Screening   Family history of Abdominal Aortic Aneurysm (AAA)? (!) YES       Last PSA:   PSA   Date Value Ref Range Status   01/16/2020 <0.01 0 - 4 ug/L Final     Comment:     Assay Method:  Chemiluminescence using Siemens Vista analyzer     ASCVD Risk   The 10-year ASCVD risk score (Carlos LEYVA, et al., 2019) is: 56.7%    Values used to calculate the score:      Age: 64 years      Sex: Male      Is Non- : No       Diabetic: Yes      Tobacco smoker: No      Systolic Blood Pressure: 171 mmHg      Is BP treated: Yes      HDL Cholesterol: 26 mg/dL      Total Cholesterol: 206 mg/dL            Reviewed and updated as needed this visit by Provider                    Past Medical History:   Diagnosis Date    Cellulitis and abscess of trunk 06/27/2017    Depression     Depressive disorder     Hyperlipidemia LDL goal <100 10/31/2010    Hypertension goal BP (blood pressure) < 140/90 03/17/2011    Hypothyroidism 01/12/2010    Morbid obesity due to excess calories (H) 01/12/2010    Neuropathy in diabetes (H) 01/12/2010    Obesity 01/12/2010    Sleep apnea 01/12/2010    CPAP    Type 2 diabetes, HbA1C goal < 8% (H) 03/08/2011     Current providers sharing in care for this patient include:  Patient Care Team:  Lisa Pierre PA-C as PCP - General (Physician Assistant)  Lucía Chávez RD as Diabetes Educator (Dietitian, Registered)  Lisa Pierre PA-C as Assigned PCP  Cindi Meraz MD as Assigned Endocrinology Provider  Jasmyn Argueta PA-C as Assigned Sleep Provider  Rasta Metzger [facility]  Loli Olivia RD as Diabetes Educator (Dietitian, Registered)  Anant Pinto MD as Assigned Behavioral Health Provider  Haylie Grullon RPH as Pharmacist (Pharmacist)  Haylie Grullon RPH as Assigned MTM Pharmacist  Lucía Chávez RD as Diabetes Educator (Dietitian, Registered)    The following health maintenance items are reviewed in Epic and correct as of today:  Health Maintenance   Topic Date Due    DIABETIC FOOT EXAM  02/15/2024    ANNUAL REVIEW OF HM ORDERS  06/30/2024    EYE EXAM  08/01/2024    A1C  08/14/2024    INFLUENZA VACCINE (1) 09/01/2024    COLORECTAL CANCER SCREENING  10/21/2024    PHQ-9  01/09/2025    LIPID  02/26/2025    TSH W/FREE T4 REFLEX  05/14/2025    MICROALBUMIN  05/16/2025    HEMOGLOBIN  05/22/2025    ALT  07/05/2025    BMP  07/05/2025    CREATININE  07/05/2025     "MEDICARE ANNUAL WELLNESS VISIT  07/09/2025    FALL RISK ASSESSMENT  07/09/2025    ADVANCE CARE PLANNING  06/30/2028    DTAP/TDAP/TD IMMUNIZATION (4 - Td or Tdap) 09/24/2031    HEPATITIS C SCREENING  Completed    DEPRESSION ACTION PLAN  Completed    Pneumococcal Vaccine: 65+ Years  Completed    URINALYSIS  Completed    ZOSTER IMMUNIZATION  Completed    RSV VACCINE (Pregnancy & 60+)  Completed    COVID-19 Vaccine  Completed    IPV IMMUNIZATION  Aged Out    HPV IMMUNIZATION  Aged Out    MENINGITIS IMMUNIZATION  Aged Out    RSV MONOCLONAL ANTIBODY  Aged Out         Review of Systems  Constitutional, neuro, ENT, endocrine, pulmonary, cardiac, gastrointestinal, genitourinary, musculoskeletal, integument and psychiatric systems are negative, except as otherwise noted.     Objective    Exam  /64   Pulse 73   Temp 97.6  F (36.4  C) (Oral)   Resp 12   Ht 1.664 m (5' 5.5\")   Wt 109.4 kg (241 lb 3.2 oz)   SpO2 99%   BMI 39.53 kg/m     Estimated body mass index is 39.53 kg/m  as calculated from the following:    Height as of this encounter: 1.664 m (5' 5.5\").    Weight as of this encounter: 109.4 kg (241 lb 3.2 oz).    Physical Exam  GENERAL: alert and no distress  RESP: lungs clear to auscultation - no rales, rhonchi or wheezes  CV: regular rate and rhythm, normal S1 S2, no S3 or S4, no murmur, click or rub, no peripheral edema   MS: contractures of bilateral hand/all digits, healing wound on finger  NEURO: mentation intact  PSYCH: mentation appears normal, affect normal/bright  Diabetic foot exam: normal DP and PT pulses, reduced sensation at mid leg, venous stasis dermatitis noted, and dependent rubor present         7/9/2024   Mini Cog   Clock Draw Score 2 Normal   3 Item Recall 2 objects recalled   Mini Cog Total Score 4                 Signed Electronically by: Lisa Pierre PA-C    Answers submitted by the patient for this visit:  Patient Health Questionnaire (Submitted on 7/9/2024)  If you checked off " any problems, how difficult have these problems made it for you to do your work, take care of things at home, or get along with other people?: Somewhat difficult  PHQ9 TOTAL SCORE: 16  JOSE MANUEL-7 (Submitted on 7/9/2024)  JOSE MANUEL 7 TOTAL SCORE: 7    DME (Durable Medical Equipment) Orders and Documentation   Hand orthotics, compression stocking  The patient was assessed and it was determined the patient is in need of the following listed DME Supplies/Equipment. Please complete supporting documentation below to demonstrate medical necessity.      DME All Other Item(s) Documentation    List reason for need and supporting documentation for medical necessity below for each DME item.     1. Type 2 diabetes with diabetic neuropathy with longterm use of insulin  2. Idopathic peripheral neuropathy  3. Peripheral vascular disease  4. Contracture of hands  5. Unsteady gait

## 2024-07-09 NOTE — TELEPHONE ENCOUNTER
DME for compression stockings on my desk, fax this and OV notes from 7/9/2024 to [facility].     No need to fax orthotic for hand braces--they to to [place] already.

## 2024-07-09 NOTE — PATIENT INSTRUCTIONS
Patient Education   Preventive Care Advice   This is general advice given by our system to help you stay healthy. However, your care team may have specific advice just for you. Please talk to your care team about your preventive care needs.  Nutrition  Eat 5 or more servings of fruits and vegetables each day.  Try wheat bread, brown rice and whole grain pasta (instead of white bread, rice, and pasta).  Get enough calcium and vitamin D. Check the label on foods and aim for 100% of the RDA (recommended daily allowance).  Lifestyle  Exercise at least 150 minutes each week  (30 minutes a day, 5 days a week).  Do muscle strengthening activities 2 days a week. These help control your weight and prevent disease.  No smoking.  Wear sunscreen to prevent skin cancer.  Have a dental exam and cleaning every 6 months.  Yearly exams  See your health care team every year to talk about:  Any changes in your health.  Any medicines your care team has prescribed.  Preventive care, family planning, and ways to prevent chronic diseases.  Shots (vaccines)   HPV shots (up to age 26), if you've never had them before.  Hepatitis B shots (up to age 59), if you've never had them before.  COVID-19 shot: Get this shot when it's due.  Flu shot: Get a flu shot every year.  Tetanus shot: Get a tetanus shot every 10 years.  Pneumococcal, hepatitis A, and RSV shots: Ask your care team if you need these based on your risk.  Shingles shot (for age 50 and up)  General health tests  Diabetes screening:  Starting at age 35, Get screened for diabetes at least every 3 years.  If you are younger than age 35, ask your care team if you should be screened for diabetes.  Cholesterol test: At age 39, start having a cholesterol test every 5 years, or more often if advised.  Bone density scan (DEXA): At age 50, ask your care team if you should have this scan for osteoporosis (brittle bones).  Hepatitis C: Get tested at least once in your life.  STIs (sexually  transmitted infections)  Before age 24: Ask your care team if you should be screened for STIs.  After age 24: Get screened for STIs if you're at risk. You are at risk for STIs (including HIV) if:  You are sexually active with more than one person.  You don't use condoms every time.  You or a partner was diagnosed with a sexually transmitted infection.  If you are at risk for HIV, ask about PrEP medicine to prevent HIV.  Get tested for HIV at least once in your life, whether you are at risk for HIV or not.  Cancer screening tests  Cervical cancer screening: If you have a cervix, begin getting regular cervical cancer screening tests starting at age 21.  Breast cancer scan (mammogram): If you've ever had breasts, begin having regular mammograms starting at age 40. This is a scan to check for breast cancer.  Colon cancer screening: It is important to start screening for colon cancer at age 45.  Have a colonoscopy test every 10 years (or more often if you're at risk) Or, ask your provider about stool tests like a FIT test every year or Cologuard test every 3 years.  To learn more about your testing options, visit:   .  For help making a decision, visit:   https://bit.ly/ah36705.  Prostate cancer screening test: If you have a prostate, ask your care team if a prostate cancer screening test (PSA) at age 55 is right for you.  Lung cancer screening: If you are a current or former smoker ages 50 to 80, ask your care team if ongoing lung cancer screenings are right for you.  For informational purposes only. Not to replace the advice of your health care provider. Copyright   2023 [facility]. All rights reserved. Clinically reviewed by the [facility] Transitions Program. [employer] [id number] - REV 01/24.  Preventing Falls: Care Instructions  Injuries and health problems such as trouble walking or poor eyesight can increase your risk of falling. So can some medicines. But there are things you can do to help  "prevent falls. You can exercise to get stronger. You can also arrange your home to make it safer.    Talk to your doctor about the medicines you take. Ask if any of them increase the risk of falls and whether they can be changed or stopped.   Try to exercise regularly. It can help improve your strength and balance. This can help lower your risk of falling.     Practice fall safety and prevention.    Wear low-heeled shoes that fit well and give your feet good support. Talk to your doctor if you have foot problems that make this hard.  Carry a cellphone or wear a medical alert device that you can use to call for help.  Use stepladders instead of chairs to reach high objects. Don't climb if you're at risk for falls. Ask for help, if needed.  Wear the correct eyeglasses, if you need them.    Make your home safer.    Remove rugs, cords, clutter, and furniture from walkways.  Keep your house well lit. Use night-lights in hallways and bathrooms.  Install and use sturdy handrails on stairways.  Wear nonskid footwear, even inside. Don't walk barefoot or in socks without shoes.    Be safe outside.    Use handrails, curb cuts, and ramps whenever possible.  Keep your hands free by using a shoulder bag or backpack.  Try to walk in well-lit areas. Watch out for uneven ground, changes in pavement, and debris.  Be careful in the winter. Walk on the grass or gravel when sidewalks are slippery. Use de-icer on steps and walkways. Add non-slip devices to shoes.    Put grab bars and nonskid mats in your shower or tub and near the toilet. Try to use a shower chair or bath bench when bathing.   Get into a tub or shower by putting in your weaker leg first. Get out with your strong side first. Have a phone or medical alert device in the bathroom with you.   Where can you learn more?  Go to https://www.OMGPOP.net/patiented  Enter G117 in the search box to learn more about \"Preventing Falls: Care Instructions.\"  Current as of: July 17, " 2023               Content Version: 14.0    6842-8318 [employer].   Care instructions adapted under license by your healthcare professional. If you have questions about a medical condition or this instruction, always ask your healthcare professional. [employer] disclaims any warranty or liability for your use of this information.

## 2024-07-11 ENCOUNTER — MYC MEDICAL ADVICE (OUTPATIENT)
Dept: FAMILY MEDICINE | Facility: CLINIC | Age: 64
End: 2024-07-11
Payer: MEDICARE

## 2024-07-12 DIAGNOSIS — Z79.4 TYPE 2 DIABETES MELLITUS WITH DIABETIC NEUROPATHY, WITH LONG-TERM CURRENT USE OF INSULIN (H): ICD-10-CM

## 2024-07-12 DIAGNOSIS — E11.40 TYPE 2 DIABETES MELLITUS WITH DIABETIC NEUROPATHY, WITH LONG-TERM CURRENT USE OF INSULIN (H): ICD-10-CM

## 2024-07-12 NOTE — TELEPHONE ENCOUNTER
Requested Prescriptions   Signed Prescriptions Disp Refills    Continuous Glucose Sensor (FREESTYLE GIULIANA 2 SENSOR) MISC 4 each 0     Si EACH EVERY 14 DAYS       There is no refill protocol information for this order

## 2024-07-18 ENCOUNTER — TELEPHONE (OUTPATIENT)
Dept: FAMILY MEDICINE | Facility: CLINIC | Age: 64
End: 2024-07-18
Payer: MEDICARE

## 2024-07-18 NOTE — TELEPHONE ENCOUNTER
Please approve the following    Focus of care: BILL hand contractures  Discipline: OT  Specific Treat Orders: Patient declined need for [facility] services at this time.    Patient would benefit from ongoing OT following recert 5 visits within 9 weeksfor IADL retraining, DME Needs, (GB toilet), Hand ROM,  strength, BUE, HEP, BLE swelling.    Please approve the above.  Thank you,  Margaret Schuster LPN  [facility]

## 2024-07-18 NOTE — TELEPHONE ENCOUNTER
Speech Therapy orders   Speech therapy 1 visit every other week for 4 weeks to address ongoing cognitive impairment.    Please approve the above.  Thank you,  Margaret Schuster LPN  [facility]

## 2024-07-18 NOTE — TELEPHONE ENCOUNTER
Thank you, I will update the clinician.    aMrgaret Schuster LPN  [facility]

## 2024-07-22 ENCOUNTER — PATIENT OUTREACH (OUTPATIENT)
Dept: GASTROENTEROLOGY | Facility: CLINIC | Age: 64
End: 2024-07-22
Payer: MEDICARE

## 2024-07-22 ENCOUNTER — MYC MEDICAL ADVICE (OUTPATIENT)
Dept: FAMILY MEDICINE | Facility: CLINIC | Age: 64
End: 2024-07-22
Payer: MEDICARE

## 2024-07-22 DIAGNOSIS — Z12.11 SPECIAL SCREENING FOR MALIGNANT NEOPLASMS, COLON: Primary | ICD-10-CM

## 2024-07-22 NOTE — TELEPHONE ENCOUNTER
See pt's "Mobile Location, IP"hart message.     Replied via [employer].     Cara CHAVARRIA RN   PAL (Patient Advocate Liaison)  [facility]

## 2024-07-22 NOTE — PROGRESS NOTES
"Patient has a history of polyps and surveillance colonoscopy is due Oct 2024. Patient meets criteria for CRC high risk standing order protocol.    CRC Screening Colonoscopy Referral Review    Patient meets the inclusion criteria for screening colonoscopy standing order.    Ordering/Referring Provider:  Lisa Pierre PA-C    BMI: Estimated body mass index is 39.53 kg/m  as calculated from the following:    Height as of 7/9/24: 1.664 m (5' 5.5\").    Weight as of 7/9/24: 109.4 kg (241 lb 3.2 oz).     Sedation:  Does patient have any of the following conditions affecting sedation?  No medical conditions affecting sedation.    Previous Scopes:  Any previous recommendations or follow up needs based on previous scope?  na / No recommendations.    Medical Concerns to Postpone Order:  Does patient have any of the following medical concerns that should postpone/delay colonoscopy referral?  No medical conditions affecting colonoscopy referral.    Final Referral Details:  Based on patient's medical history patient is appropriate for referral order with moderate sedation. If patient's BMI > 50 do not schedule in ASC.  "

## 2024-07-22 NOTE — TELEPHONE ENCOUNTER
See pt's "[employer]hart message.     Replied via [employer].     Cara CHAVARRIA RN   PAL (Patient Advocate Liaison)  [facility]

## 2024-08-01 ENCOUNTER — TELEPHONE (OUTPATIENT)
Dept: FAMILY MEDICINE | Facility: CLINIC | Age: 64
End: 2024-08-01
Payer: MEDICARE

## 2024-08-01 NOTE — TELEPHONE ENCOUNTER
Home care calling to report abnormal BP: 169/86. Pt is asymptomatic. Patient had not taken AM BP meds before this was taken, did take meds while home care was there.    Routing to provider. Please advise if anything additional recommendations at this time.    Tatyana Ferguson RN on 8/1/2024 at 11:42 AM

## 2024-08-02 NOTE — TELEPHONE ENCOUNTER
Stefany Chan PA-C- CRYSTAL with update.     Called and spoke with pt,     Pt states he is feeling well today and denies any headaches, dizziness, vision changes, or chest pain. He has not checked his BP today. He states he had not taken his BP medications on 7/31 as he was out and had not yet taken them yet on 8/1 when it was checked. He now reports having all of his medications.     Routed to Stefany Chan PA-C as AUGIEI    Called and spoke with KAT Marroquin with update to close the loop.     Cara CHAVARRIA RN   Clinic RN  [facility]

## 2024-08-04 ENCOUNTER — DOCUMENTATION ONLY (OUTPATIENT)
Dept: PSYCHIATRY | Facility: CLINIC | Age: 64
End: 2024-08-04
Payer: MEDICARE

## 2024-08-04 DIAGNOSIS — F33.1 MAJOR DEPRESSIVE DISORDER, RECURRENT EPISODE, MODERATE (H): Primary | ICD-10-CM

## 2024-08-04 DIAGNOSIS — E87.1 HYPONATREMIA: ICD-10-CM

## 2024-08-04 NOTE — PROGRESS NOTES
Steve SYKES Cathy has an upcoming lab appointment:    Future Appointments   Date Time Provider Department Center   8/19/2024 11:30 AM CR LAB CRLABR CR   9/9/2024  1:30 PM Cindi Meraz MD RIENCR RI   9/17/2024  2:30 PM Catia An LICSW CCFLB [facility]   9/17/2024  3:00 PM Anant Pinto MD CCFLP [facility]   1/7/2025  1:30 PM Trevon Rivera MBBS MDRHEU [facility] MPLW   7/16/2025  3:00 PM Lisa Pierre, ANASTASIA CRFP CR     Patient stated they need labs for Anant Pinto and here is no orders placed by this provider. Please review and place either future orders or HMPO (Review of Health Maintenance Protocol Orders), as appropriate.    Thank You,    Misti Lim  [facility]  [facility]  P: [phone]

## 2024-08-11 DIAGNOSIS — E11.40 TYPE 2 DIABETES MELLITUS WITH DIABETIC NEUROPATHY, WITH LONG-TERM CURRENT USE OF INSULIN (H): ICD-10-CM

## 2024-08-11 DIAGNOSIS — Z79.4 TYPE 2 DIABETES MELLITUS WITH DIABETIC NEUROPATHY, WITH LONG-TERM CURRENT USE OF INSULIN (H): ICD-10-CM

## 2024-08-13 ENCOUNTER — TELEPHONE (OUTPATIENT)
Dept: FAMILY MEDICINE | Facility: CLINIC | Age: 64
End: 2024-08-13
Payer: MEDICARE

## 2024-08-13 RX ORDER — INSULIN GLARGINE 100 [IU]/ML
40 INJECTION, SOLUTION SUBCUTANEOUS DAILY
Qty: 15 ML | Refills: 0 | Status: SHIPPED | OUTPATIENT
Start: 2024-08-13 | End: 2024-09-09

## 2024-08-13 NOTE — TELEPHONE ENCOUNTER
Requested Prescriptions   Signed Prescriptions Disp Refills    insulin glargine 100 UNIT/ML pen 15 mL 0     Sig: INJECT 40 UNITS SUBCUTANEOUS DAILY       Insulin Protocol Failed - 8/13/2024 10:32 AM        Failed - Has GFR on file in past 12 months and most recent value is normal        Failed - Chart Review Required     Review Chart.    Do not approve if insulin is used in a pump.  Instead, direct refill request to the patient's endocrinologist.  If the patient doesn't have an endocrinologist, then send the refill to the patient's PCP for review            Passed - Medication is active on med list        Passed - Recent (6 mo) or future (90 days) visit within the authorizing provider's specialty     The patient must have completed an in-person or virtual visit within the past 6 months or has a future visit scheduled within the next 90 days with the authorizing provider s specialty.  Urgent care and e-visits do not quality as an office visit for this protocol.          Passed - Medication indicated for associated diagnosis     Medication is associated with one or more of the following diagnoses:   - Type 1 diabetes mellitus  - Type 2 diabetes mellitus  - Diabetic nephropathy; Prophylaxis  - Neuropathy due to diabetes mellitus; Prophylaxis  - Retinopathy due to diabetes mellitus; Prophylaxis  - Diabetes mellitus associated with cystic fibrosis  - Disorder of cardiovascular system; Prophylaxis - Type 1 diabetes mellitus   - Disorder of cardiovascular system; Prophylaxis - Type 2 diabetes mellitus            Passed - Patient is 18 years of age or older

## 2024-08-13 NOTE — TELEPHONE ENCOUNTER
Forms/Letter Request    Type of form/letter: [facility] Certification and POC  Order #[id number]    Do we have the form/letter: Yes: PCP in basket    Who is the form from? Home care    Where did/will the form come from? form was faxed in    When is form/letter needed by:     How would you like the form/letter returned: Fax : [phone]      ANUJ Todd

## 2024-08-21 ENCOUNTER — DOCUMENTATION ONLY (OUTPATIENT)
Dept: ENDOCRINOLOGY | Facility: CLINIC | Age: 64
End: 2024-08-21
Payer: MEDICARE

## 2024-08-21 DIAGNOSIS — E03.4 HYPOTHYROIDISM DUE TO ACQUIRED ATROPHY OF THYROID: Primary | ICD-10-CM

## 2024-08-21 NOTE — PROGRESS NOTES
"Steve Morgan has an upcoming lab appointment:    Future Appointments   Date Time Provider Department Center   8/26/2024 11:15 AM CR LAB CRLABR CR   9/9/2024  1:30 PM Cindi Mreaz MD RICARMENR RI   9/17/2024  2:30 PM Catia An LICSW CCFLB [facility]   9/17/2024  3:00 PM Anant Pinto MD CCFLP [facility]   1/7/2025  1:30 PM Trevon Rivera MBBS MDRHEU [facility] MPLW   7/16/2025  3:00 PM Lisa Pierre, ANASTASIA CRFP CR     Appointment notes state, \"labs for Sylvialuis a\". Currently there is no labs ordered by this provider. Please review and place either future orders or HMPO (Review of Health Maintenance Protocol Orders), as appropriate.    Thank You,    Misti Lim  [facility]  [facility]  P: [phone]   "

## 2024-08-23 ENCOUNTER — TRANSFERRED RECORDS (OUTPATIENT)
Dept: HEALTH INFORMATION MANAGEMENT | Facility: CLINIC | Age: 64
End: 2024-08-23
Payer: MEDICARE

## 2024-08-26 ENCOUNTER — LAB (OUTPATIENT)
Dept: LAB | Facility: CLINIC | Age: 64
End: 2024-08-26
Payer: MEDICARE

## 2024-08-26 DIAGNOSIS — E03.4 HYPOTHYROIDISM DUE TO ACQUIRED ATROPHY OF THYROID: ICD-10-CM

## 2024-08-26 DIAGNOSIS — Z12.5 SCREENING FOR PROSTATE CANCER: ICD-10-CM

## 2024-08-26 DIAGNOSIS — F33.1 MAJOR DEPRESSIVE DISORDER, RECURRENT EPISODE, MODERATE (H): ICD-10-CM

## 2024-08-26 DIAGNOSIS — E87.1 HYPONATREMIA: ICD-10-CM

## 2024-08-26 LAB
ANION GAP SERPL CALCULATED.3IONS-SCNC: 11 MMOL/L (ref 7–15)
BUN SERPL-MCNC: 34.5 MG/DL (ref 8–23)
CALCIUM SERPL-MCNC: 9.6 MG/DL (ref 8.8–10.4)
CHLORIDE SERPL-SCNC: 100 MMOL/L (ref 98–107)
CREAT SERPL-MCNC: 1.72 MG/DL (ref 0.67–1.17)
EGFRCR SERPLBLD CKD-EPI 2021: 43 ML/MIN/1.73M2
GLUCOSE SERPL-MCNC: 252 MG/DL (ref 70–99)
HCO3 SERPL-SCNC: 18 MMOL/L (ref 22–29)
POTASSIUM SERPL-SCNC: 5.6 MMOL/L (ref 3.4–5.3)
PSA SERPL DL<=0.01 NG/ML-MCNC: 0.02 NG/ML (ref 0–4.5)
SODIUM SERPL-SCNC: 129 MMOL/L (ref 135–145)
T4 FREE SERPL-MCNC: 1.47 NG/DL (ref 0.9–1.7)
TSH SERPL DL<=0.005 MIU/L-ACNC: 2.37 UIU/ML (ref 0.3–4.2)

## 2024-08-26 PROCEDURE — 84443 ASSAY THYROID STIM HORMONE: CPT

## 2024-08-26 PROCEDURE — 36415 COLL VENOUS BLD VENIPUNCTURE: CPT

## 2024-08-26 PROCEDURE — G0103 PSA SCREENING: HCPCS

## 2024-08-26 PROCEDURE — 84439 ASSAY OF FREE THYROXINE: CPT

## 2024-08-26 PROCEDURE — 80048 BASIC METABOLIC PNL TOTAL CA: CPT

## 2024-08-27 ENCOUNTER — TELEPHONE (OUTPATIENT)
Dept: FAMILY MEDICINE | Facility: CLINIC | Age: 64
End: 2024-08-27
Payer: MEDICARE

## 2024-08-27 DIAGNOSIS — E87.5 HYPERKALEMIA: Primary | ICD-10-CM

## 2024-08-27 NOTE — TELEPHONE ENCOUNTER
----- Message from Lisa Pierre sent at 8/27/2024  2:42 PM CDT -----  RN's  Please call pt. As well.    Caio,    Your sodium was low at 129 and potassium was up at 5.6. Please limit your intake of potassium rich foods such as: orange juice, bananas, bell peppers and baked potatoes with the skin. Make sure you are staying hydrated, but don't consume too much water either. I would like you to make a lab only appointment in 1 week to have this rechecked.     Lisa Pierre PA-C

## 2024-08-27 NOTE — TELEPHONE ENCOUNTER
"Notified Patient of Lisa Pierre PA-C's message: \"Your sodium was low at 129 and potassium was up at 5.6. Please limit your intake of potassium rich foods such as: orange juice, bananas, bell peppers and baked potatoes with the skin. Make sure you are staying hydrated, but don't consume too much water either. I would like you to make a lab only appointment in 1 week to have this rechecked.\"    Person was given an opportunity to ask questions, verbalized understanding of plan, and is agreeable. Scheduled for 9/6 lab only.    Pilar Bernstein RN on 8/27/2024 at 3:00 PM    "
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
## 2024-08-29 DIAGNOSIS — E11.40 TYPE 2 DIABETES MELLITUS WITH DIABETIC NEUROPATHY, WITH LONG-TERM CURRENT USE OF INSULIN (H): ICD-10-CM

## 2024-08-29 DIAGNOSIS — E03.4 HYPOTHYROIDISM DUE TO ACQUIRED ATROPHY OF THYROID: ICD-10-CM

## 2024-08-29 DIAGNOSIS — Z79.4 TYPE 2 DIABETES MELLITUS WITH DIABETIC NEUROPATHY, WITH LONG-TERM CURRENT USE OF INSULIN (H): ICD-10-CM

## 2024-08-29 RX ORDER — LEVOTHYROXINE SODIUM 137 UG/1
137 TABLET ORAL DAILY
Qty: 30 TABLET | Refills: 0 | Status: SHIPPED | OUTPATIENT
Start: 2024-08-29 | End: 2024-09-09

## 2024-08-29 NOTE — TELEPHONE ENCOUNTER
Requested Prescriptions   Pending Prescriptions Disp Refills    LEVOXYL 137 MCG tablet [Pharmacy Med Name: LEVOXYL 137 MCG TABLET] 90 tablet      Sig: TAKE 1 TABLET BY MOUTH EVERY DAY       Thyroid Protocol Passed - 8/29/2024 12:23 AM        Passed - Patient is 12 years or older        Passed - Recent (12 mo) or future (30 days) visit within the authorizing provider's specialty     The patient must have completed an in-person or virtual visit within the past 12 months or has a future visit scheduled within the next 90 days with the authorizing provider s specialty.  Urgent care and e-visits do not quality as an office visit for this protocol.          Passed - Medication is active on med list        Passed - Medication indicated for associated diagnosis     Medication is associated with one or more of the following diagnoses:  Hypothyroidism  Thyroid stimulating hormone suppression therapy  Thyroid cancer  Acquired atrophy of thyroid          Passed - Normal TSH on file in past 12 months     Recent Labs   Lab Test 08/26/24  1402   TSH 2.37

## 2024-08-29 NOTE — PROGRESS NOTES
Outcome for 08/29/24 7:41 AM: [employer] message sent  Carmelita Mendoza MA  Outcome for 09/03/24 8:56 AM: Replied to Tag & See message  Carmelita Mendoza MA  Outcome for 09/05/24 9:45 AM: Per patient, will send BG readings via Tag & See  Bobbi Grossaint, VF  Outcome for 09/05/24 11:37 AM: Replied to Tag & See message  Carmelita Mendoza MA  Outcome for 09/05/24 12:22 PM: Sent invite to connect to clinic for sharing  Carmelita Mendoza MA  Outcome for 09/05/24 12:58 PM: Replied to Tag & See message  Carmelita Mendoza MA  Outcome for 09/05/24 2:56 PM: Nina emailed to provider  Carmelita Mendoza MA

## 2024-08-30 ENCOUNTER — TELEPHONE (OUTPATIENT)
Dept: FAMILY MEDICINE | Facility: CLINIC | Age: 64
End: 2024-08-30
Payer: MEDICARE

## 2024-08-30 RX ORDER — INSULIN GLARGINE 100 [IU]/ML
40 INJECTION, SOLUTION SUBCUTANEOUS DAILY
OUTPATIENT
Start: 2024-08-30

## 2024-08-30 NOTE — TELEPHONE ENCOUNTER
FOCUS OF CARE: SUPERFICIAL TRAUMA WOUNDS TO RIGHT HAND   DISCIPLINE: SN 3 VISITS    SPECIFIC TREATMENT ORDERS:   SKILLED NURSE, PATIENT OR CAREGIVER TO PERFORM/TEACH WOUND CARE TO SUPERFICIAL TRAUMA WOUNDS TO RIGHT WRIST, RIGHT PALM AND RIGHT 4TH FINGER AS FOLLOWS: USE CLEAN TECHNIQUE. SN, PATIENT OR CAREGIVER TO CLEANSE WITH MILD AND WATER. PAT DRY WITH GAUZE. APPLY HEALING OINTMENT OR BARRIER CREAM WITHOUT ZINC (SUCH AS OTC AQUAPHOR OR A&D OINTMENT). COVER WITH BORDERED GAUZE DRESSING. PERFORM WOUND CARE DAILY AND PRN FOR SOILAGE OR DISLODGEMENT. MAY DISCONTINUE WOUND CARE WHEN WOUND CLOSED OR HEALED.    MAY USE THE FOLLOWING PROTOCOL AS AN ALTERNATE DRESSING AS NEEDED IF THE ABOVE WOUND TREATMENT SUPPLIES ARE UNAVAILABLE IN THE HOME: CLEANSE WITH NORMAL SALINE OR WOUND CLEANSER, PAT DRY, COVER WITH DRY STERILE DRESSING  AND SECURE WITH TAPE OR GAUZE.                                                  ALLOW 3 PRN SN VISITS TO ASSESS REPORTS OF CHANGES IN THE WOUND THAT WOULD INDICATE INFECTION SUCH AS REDNESS, SWELLING, PAIN, ABNORMAL DISCHARGE, ODOR, ABNORMAL GRANULATION TISSUE OR FEVER; TO REAPPLY DRESSING; TO EVALUATE A MALFUNCTION IN EQUIPMENT SUCH AS WOUND VAC OR SUPPORT SURFACE. MAY OBTAIN WOUND CULTURE PRN S/SX OF INFECTION.    SKILLED NURSE TO PERFORM DURING SN VISIT, ALTERNATE CAREGIVER TO PERFORM ON NON-SKILLED DAYS.    Please approve the above.  Thank you,  Margaret Schuster LPN  [facility]

## 2024-09-04 DIAGNOSIS — N18.31 CHRONIC KIDNEY DISEASE, STAGE 3A (H): Primary | ICD-10-CM

## 2024-09-04 DIAGNOSIS — D63.1 ANEMIA IN CHRONIC KIDNEY DISEASE (CKD): ICD-10-CM

## 2024-09-04 DIAGNOSIS — R80.9 PROTEINURIA: ICD-10-CM

## 2024-09-04 DIAGNOSIS — N18.9 ANEMIA IN CHRONIC KIDNEY DISEASE (CKD): ICD-10-CM

## 2024-09-05 ENCOUNTER — TELEPHONE (OUTPATIENT)
Dept: FAMILY MEDICINE | Facility: CLINIC | Age: 64
End: 2024-09-05
Payer: MEDICARE

## 2024-09-05 ENCOUNTER — TELEPHONE (OUTPATIENT)
Dept: ENDOCRINOLOGY | Facility: CLINIC | Age: 64
End: 2024-09-05
Payer: MEDICARE

## 2024-09-05 DIAGNOSIS — F33.1 MAJOR DEPRESSIVE DISORDER, RECURRENT EPISODE, MODERATE (H): ICD-10-CM

## 2024-09-05 NOTE — TELEPHONE ENCOUNTER
Reason for Call:  Appointment Request    Patient requesting this type of appt:  ESTA CARE/ NEED A NEW PCP , MINE IS LEAVING  [facility]. WONDERING IF I CAN BE ESTA FOR A NEW PT WITH HERMINIO CHATMAN CNP AT THE  LOCATION?     Requested provider:  Herminio Chatman CNP    Reason patient unable to be scheduled:  PROVIDER NOT ACCEPTING NEW PATIENTS    When does patient want to be seen/preferred time:  CURRENTLY DO NOT NEED AN APPT     Comments: N/A    Could we send this information to you in A-TEX or would you prefer to receive a phone call?:   Patient would like to be contacted via A-TEX    Call taken on 9/5/2024 at 2:01 PM by Uche Rodriguez

## 2024-09-05 NOTE — TELEPHONE ENCOUNTER
Reason for Call:  Medication refill:    Do you use a [facility]? No    Name of the pharmacy and phone number for the current request:  [facility] [phone]    Name of the medication requested: Vilazodone    Other request: Patient is requesting a 90 days supply    Can we leave a detailed message on this number? YES    Phone number patient can be reached at: Home number on file [phone] (home)    Best Time: Anytime     Call taken on 9/5/2024 at 2:55 PM by Thomas Hernandez

## 2024-09-06 ENCOUNTER — TELEPHONE (OUTPATIENT)
Dept: PSYCHIATRY | Facility: CLINIC | Age: 64
End: 2024-09-06

## 2024-09-06 ENCOUNTER — LAB (OUTPATIENT)
Dept: LAB | Facility: CLINIC | Age: 64
End: 2024-09-06
Payer: MEDICARE

## 2024-09-06 DIAGNOSIS — N18.31 CHRONIC KIDNEY DISEASE, STAGE 3A (H): ICD-10-CM

## 2024-09-06 DIAGNOSIS — E87.5 HYPERKALEMIA: ICD-10-CM

## 2024-09-06 DIAGNOSIS — N18.9 ANEMIA IN CHRONIC KIDNEY DISEASE (CKD): ICD-10-CM

## 2024-09-06 DIAGNOSIS — D63.1 ANEMIA IN CHRONIC KIDNEY DISEASE (CKD): ICD-10-CM

## 2024-09-06 DIAGNOSIS — R80.9 PROTEINURIA: ICD-10-CM

## 2024-09-06 LAB
ALBUMIN SERPL BCG-MCNC: 3.9 G/DL (ref 3.5–5.2)
ANION GAP SERPL CALCULATED.3IONS-SCNC: 12 MMOL/L (ref 7–15)
BASOPHILS # BLD AUTO: 0 10E3/UL (ref 0–0.2)
BASOPHILS NFR BLD AUTO: 1 %
BUN SERPL-MCNC: 28 MG/DL (ref 8–23)
CALCIUM SERPL-MCNC: 9.3 MG/DL (ref 8.8–10.4)
CHLORIDE SERPL-SCNC: 96 MMOL/L (ref 98–107)
CREAT SERPL-MCNC: 1.74 MG/DL (ref 0.67–1.17)
EGFRCR SERPLBLD CKD-EPI 2021: 43 ML/MIN/1.73M2
EOSINOPHIL # BLD AUTO: 0.1 10E3/UL (ref 0–0.7)
EOSINOPHIL NFR BLD AUTO: 1 %
ERYTHROCYTE [DISTWIDTH] IN BLOOD BY AUTOMATED COUNT: 13.3 % (ref 10–15)
FERRITIN SERPL-MCNC: 102 NG/ML (ref 31–409)
GLUCOSE SERPL-MCNC: 246 MG/DL (ref 70–99)
HCO3 SERPL-SCNC: 20 MMOL/L (ref 22–29)
HCT VFR BLD AUTO: 29.8 % (ref 40–53)
HGB BLD-MCNC: 10.3 G/DL (ref 13.3–17.7)
IMM GRANULOCYTES # BLD: 0 10E3/UL
IMM GRANULOCYTES NFR BLD: 0 %
IRON BINDING CAPACITY (ROCHE): 245 UG/DL (ref 240–430)
IRON SATN MFR SERPL: 21 % (ref 15–46)
IRON SERPL-MCNC: 52 UG/DL (ref 61–157)
LYMPHOCYTES # BLD AUTO: 1.5 10E3/UL (ref 0.8–5.3)
LYMPHOCYTES NFR BLD AUTO: 24 %
MCH RBC QN AUTO: 29.8 PG (ref 26.5–33)
MCHC RBC AUTO-ENTMCNC: 34.6 G/DL (ref 31.5–36.5)
MCV RBC AUTO: 86 FL (ref 78–100)
MONOCYTES # BLD AUTO: 0.5 10E3/UL (ref 0–1.3)
MONOCYTES NFR BLD AUTO: 8 %
NEUTROPHILS # BLD AUTO: 4.2 10E3/UL (ref 1.6–8.3)
NEUTROPHILS NFR BLD AUTO: 67 %
PHOSPHATE SERPL-MCNC: 3.8 MG/DL (ref 2.5–4.5)
PLATELET # BLD AUTO: 277 10E3/UL (ref 150–450)
POTASSIUM SERPL-SCNC: 5.9 MMOL/L (ref 3.4–5.3)
PTH-INTACT SERPL-MCNC: 13 PG/ML (ref 15–65)
RBC # BLD AUTO: 3.46 10E6/UL (ref 4.4–5.9)
SODIUM SERPL-SCNC: 128 MMOL/L (ref 135–145)
VIT D+METAB SERPL-MCNC: 49 NG/ML (ref 20–50)
WBC # BLD AUTO: 6.4 10E3/UL (ref 4–11)

## 2024-09-06 PROCEDURE — 83540 ASSAY OF IRON: CPT

## 2024-09-06 PROCEDURE — 36415 COLL VENOUS BLD VENIPUNCTURE: CPT

## 2024-09-06 PROCEDURE — 80069 RENAL FUNCTION PANEL: CPT

## 2024-09-06 PROCEDURE — 85025 COMPLETE CBC W/AUTO DIFF WBC: CPT

## 2024-09-06 PROCEDURE — 82728 ASSAY OF FERRITIN: CPT

## 2024-09-06 PROCEDURE — 83550 IRON BINDING TEST: CPT

## 2024-09-06 PROCEDURE — 83970 ASSAY OF PARATHORMONE: CPT

## 2024-09-06 PROCEDURE — 82306 VITAMIN D 25 HYDROXY: CPT

## 2024-09-06 RX ORDER — VILAZODONE HYDROCHLORIDE 10 MG/1
10 TABLET ORAL DAILY
Qty: 90 TABLET | Refills: 0 | Status: SHIPPED | OUTPATIENT
Start: 2024-09-06 | End: 2024-10-03

## 2024-09-06 NOTE — TELEPHONE ENCOUNTER
RN called the patient at [phone] to let him know a 90-day supply of vilazodone (Viibryd) 10 mg tablets were sent to [facility] in [place]     Bisi De RN on 9/6/2024 at 9:56 AM

## 2024-09-06 NOTE — TELEPHONE ENCOUNTER
RN attempted to call the patient but he did not answer.  RN asked for a call back at [phone] so we could determine if he has been without this medication and if so for how long?      Date of Last Office Visit: 6/18/2024  Date of Next Office Visit:  9/17/2024  No shows since last visit: No  More than one patient-initiated cancellation (with reschedule) since last seen in clinic? No 1 on 7/30/2024    []Medication refilled per  Medication Refill in Ambulatory Care  policy.  [x]Medication unable to be refilled by RN due to criteria not met as indicated below:    []Eligibility: has not had a provider visit within last 6 months   []Supervision: no future appointment; < 7 days before next appointment   [x]Compliance: no shows; cancellations; lapse in therapy   []Verification: order discrepancy; may need modification...   [x] > 30-day supply request   []Advanced refill request: > 7 days before refill date   []Controlled medication   []Medication not included in policy   []Review: new med; med adjusted ? 30 days; safety alert; requires lab monitoring...   []Scope of Practice: refill request processed by LPN/MA   []Other:      Medication(s) requested:   -  vilazodone (VIIBRYD) 10 MG TABS tablet   Date last ordered: 6/18/2024  Qty: 30  Refills: 1  Appropriate for refill? Yes      Any Controlled Substance(s)? No      Requested medication(s) verified as identical to current order? Yes    Any lapse in adherence to medication(s) greater than 5 days? Unknown, RN attempted to call the patient but he did not answer.  RN asked for a call back at [phone] so we could determine if he has been without this medication and if so for how long.    Additional action taken? routed encounter to provider for review.      Last visit treatment plan:   PLAN:     Patient advised of consultative model. Patient will continue to be seen for ongoing consultation and stabilization.  Iterated consult model  Does not meet criteria for  involuntary treatment or hospitalization  Add mirtazapine 5/25/23 7.5 mg po at bedtime x 3 nights then 15 mg po at bedtime slight benefit for anger=> 7/27/23 30 mg at bedtime efficacy partially, difficulty with irritability => 11/16/23 45 mg po at bedtime partial benefits => 2/27/24 60 mg at bedtime no effects-Risks, benefits and alternatives discussed.  Patient provides verbal consent to treatment.  restart bupropion 11/16/23 xl 150 mg daily-Risks, benefits and alternatives discussed.  Patient provides verbal consent to treatment. With renal disease avoid 300 mg can consider 200 mg/24 h  Add vilazodone 6/18/24 10 mg daily -Risks, benefits and alternatives discussed.  Patient provides verbal consent to treatment.  Discussed hyponatremia considerations.  Consider buspirone, lower doses  DC'd citalopram (hyponatremia/SIADH)  Labs - reviewed; follow-up [facility] in august  Referred for MTM referral, priority on guidance for psychotropic selection and risk of hyponatremia  Therapy with JULIANE Carlin; switching to long term therapy with ISAIAH Hicks   Anger management course - to start June or July  [facility] to send DBT resources to him           Assessment & Plan      Assessment  - Struggle with finding a medication that can help with mental health symptoms without causing side effects  - Considering adding another medication, Viibryd, but need to monitor sodium levels     Plan  - Start Viibryd at low dose of 10mg/day  - Monitor for side effects such as nausea, diarrhea, constipation, headaches, lightheadedness, dizziness  - Check sodium levels in two months  - Virtual visit in six weeks     Prescription  Viibryd, 10mg/day, to be taken in the morning     Appointments  - Lab appointment in two months to check sodium levels  - Virtual visit in six weeks         Any medication(s) require lab monitoring? No

## 2024-09-06 NOTE — TELEPHONE ENCOUNTER
Reason for call:  Other   Patient called regarding (reason for call): returning call  Additional comments: Patient returning a phone call for LUCHO Mathews. LUCHO Mathews on the phone with another patient. Patient requests another call back. Patient reports that his last dose of vilazodone was yesterday and he does not have any to take today.    Phone number to reach patient:  Cell number on file:    Telephone Information:   Mobile [phone]       Best Time:  ASAP    Can we leave a detailed message on this number?  YES    Travel screening: Not Applicable

## 2024-09-09 ENCOUNTER — VIRTUAL VISIT (OUTPATIENT)
Dept: ENDOCRINOLOGY | Facility: CLINIC | Age: 64
End: 2024-09-09
Payer: MEDICARE

## 2024-09-09 VITALS — HEIGHT: 66 IN | WEIGHT: 237 LBS | BODY MASS INDEX: 38.09 KG/M2

## 2024-09-09 DIAGNOSIS — Z79.4 TYPE 2 DIABETES MELLITUS WITH DIABETIC NEUROPATHY, WITH LONG-TERM CURRENT USE OF INSULIN (H): Primary | ICD-10-CM

## 2024-09-09 DIAGNOSIS — R73.9 HYPERGLYCEMIA: ICD-10-CM

## 2024-09-09 DIAGNOSIS — K21.9 GASTROESOPHAGEAL REFLUX DISEASE WITHOUT ESOPHAGITIS: ICD-10-CM

## 2024-09-09 DIAGNOSIS — E78.5 DYSLIPIDEMIA: ICD-10-CM

## 2024-09-09 DIAGNOSIS — E11.40 TYPE 2 DIABETES MELLITUS WITH DIABETIC NEUROPATHY, WITH LONG-TERM CURRENT USE OF INSULIN (H): Primary | ICD-10-CM

## 2024-09-09 DIAGNOSIS — E11.40 TYPE 2 DIABETES MELLITUS WITH DIABETIC NEUROPATHY, WITHOUT LONG-TERM CURRENT USE OF INSULIN (H): ICD-10-CM

## 2024-09-09 DIAGNOSIS — E03.4 HYPOTHYROIDISM DUE TO ACQUIRED ATROPHY OF THYROID: ICD-10-CM

## 2024-09-09 PROCEDURE — G2211 COMPLEX E/M VISIT ADD ON: HCPCS | Mod: 95 | Performed by: INTERNAL MEDICINE

## 2024-09-09 PROCEDURE — 99214 OFFICE O/P EST MOD 30 MIN: CPT | Mod: 95 | Performed by: INTERNAL MEDICINE

## 2024-09-09 PROCEDURE — 95251 CONT GLUC MNTR ANALYSIS I&R: CPT | Mod: 95 | Performed by: INTERNAL MEDICINE

## 2024-09-09 RX ORDER — FLURBIPROFEN SODIUM 0.3 MG/ML
SOLUTION/ DROPS OPHTHALMIC
Qty: 100 EACH | Refills: 3 | Status: SHIPPED | OUTPATIENT
Start: 2024-09-09

## 2024-09-09 RX ORDER — PANTOPRAZOLE SODIUM 40 MG/1
40 TABLET, DELAYED RELEASE ORAL DAILY PRN
Qty: 90 TABLET | Refills: 0 | Status: SHIPPED | OUTPATIENT
Start: 2024-09-09

## 2024-09-09 RX ORDER — INSULIN GLARGINE 100 [IU]/ML
50 INJECTION, SOLUTION SUBCUTANEOUS DAILY
Qty: 30 ML | Refills: 2 | Status: SHIPPED | OUTPATIENT
Start: 2024-09-09

## 2024-09-09 RX ORDER — LEVOTHYROXINE SODIUM 137 UG/1
137 TABLET ORAL DAILY
Qty: 90 TABLET | Refills: 3 | Status: SHIPPED | OUTPATIENT
Start: 2024-09-09

## 2024-09-09 ASSESSMENT — PATIENT HEALTH QUESTIONNAIRE - PHQ9: SUM OF ALL RESPONSES TO PHQ QUESTIONS 1-9: 8

## 2024-09-09 ASSESSMENT — PAIN SCALES - GENERAL: PAINLEVEL: NO PAIN (1)

## 2024-09-09 NOTE — NURSING NOTE
Current patient location: [address]  [facility] [address]    Is the patient currently in the state of [place]? YES    Visit mode:VIDEO    If the visit is dropped, the patient can be reconnected by: VIDEO VISIT: Send to e-mail at: [email or web address]    Will anyone else be joining the visit? NO  (If patient encounters technical issues they should call [phone] :[phone])    How would you like to obtain your AVS? MyChart    Are changes needed to the allergy or medication list? No    Are refills needed on medications prescribed by this physician? NO    Rooming Documentation:  Questionnaire(s) completed      Reason for visit: RECHECK    Nathalie CHINF

## 2024-09-09 NOTE — PATIENT INSTRUCTIONS
[facility]  Dr Meraz, Endocrinology Department    [facility]   [address] [facility]  [place] [address]  Appointment [phone]  Fax: [phone]  [place]: Monday - Thursday      To provide the best diabetic care, please bring your blood glucose meter to each and every visit with your Endocrinologist.  Your blood glucose meter/insulin pump will be downloaded at every appointment.    Please arrive 15 minutes before your scheduled appointment.  This will allow for your blood glucose meter/insulin pump to be downloaded.  If you are wearing DEXCOM please bring  or sharing code so that it can be downloaded.  If you are using freestyle juan personal sensors please bring the reader.  If you are using TANDEM insulin pump please have your username and password to get info from [employer] website.  Labs in October. Lab only appointment.  Continue Metformin 1000 mg twice a day  Continue Glipizide XL 10 mg/day in AM with food  Increase Lantus to 50 units at night.  Decrease carbs at breakfast.  Continue to use juan 2.  Check if juan 3 is covered and inform us if you need a Rx.    Follow up with Cara SMALLS in 4-5 months with labs prior.  ( She is a new Physician assistant who is at [facility] on Monday and  and will see follow up Diabetes patients)  Repeat labs and follow up with  in 4-5 months after that.  Please make a lab appointment for blood work and follow up clinic appointment in 1 week after that to discuss results. ( Recommend in person visit)    Recommend checking blood sugars before meals and at bedtime.    If Blood glucose are low more often-> 2-3 times/week- give us a call.  Make better food choices: reduce carbs, Reduce portion size, weight loss and exercise 3-4 times a week.    What is hypoglycemia:  Hypoglycemia is when blood sugar levels become too low - below 70 m/dl.      What causes hypoglycemia?  - using too  much insulin  -taking too many diabetes pills  -not eating enough, or skipping meals or snacks  -not eating enough carbohydrate with meals  -changing your exercise routine  -drinking alcohol in excess    It is also possible to have hypoglycemia even when you are carefully managing your blood sugar levels.    What does it feel like when blood sugars get too low?  You may feel:  - anxious  -confused  -dizzy  -hungry  -light-headed  -nervous  -shaky  -sleepy  -sweaty    You may have  -blurred or cloudy vision  -heart palpitations (heart skips a beat or races)  -tingling or numbness around the mouth and tongue  -tremors    What to do if you have symptoms of hypoglycmemia:  If you think your blood sugar is too low, check it with a glucose meter.  If its below 70 mg/dl, consume one of the following:  Fruit juice (1/2 cup)  Glucose tablets (15 grams)  Hard candy (5 to 7 pieces)  Honey or sugar (2 teaspoons)  Milk (1/2 cup)  Soft drink (non-diet, 1/2 cup)    Wait 15 minutes and check your blood glucose again.  IF it is still below 70 mg/dl, have another food item listed above. Wait another 15 minutes and repeat the blood glucose test.  Have a small meal or snack that contains some carbohydrate after your blood glucose rises above 70 mg/dl.    If you are at risk of hypoglycemia, always carry with you glucose tablets or one of the foods listed above.      To prevent Hypoglycemia:  Avoid situations that may cause hypoglycemia  Before making any change to your diet or exercise routine, discuss them with your healthcare provider  Keep a record of your blood glucose levels.  Include the time of day, diabetes medications, when you had your last meal or snack, and what you were doing at the time (e.g. Watching TV, gardening, jogging, etc).    Talk to your healthcare provider if your blood glucose levels are often low        Patient guide on  hypoglycemia    [email or web address]/[email or web address]-diagnosis-[email or web address]-management-[email or web address]

## 2024-09-09 NOTE — PROGRESS NOTES
"THIS IS A VIDEO VISIT:    Phone call visit/virtual visit encounter:    Name of patient: Steve Morgan    Date of encounter: 9/9/2024    Time of start of video visit: 1:34    Video started: 1:41    Video ended: 1:55    Provider location: working from home/ [facility]    Patient location: patients home.    Mode of transmission: [employer] video/ [employer]    Verbal consent: obtained before starting visit. Pt is agreeable.      The patient has been notified of following:      \"This VIDEO visit will be conducted via a call between you and your physician/provider. We have found that certain health care needs can be provided without the need for a physical exam.  This service lets us provide the care you need with a short phone conversation.  If a prescription is necessary we can send it directly to your pharmacy.  If lab work is needed we can place an order for that and you can then stop by our lab to have the test done at a later time.     With new updates with corona virus patient might be billed as clinic visit.     If during the course of the call the physician/provider feels a telephone visit is not appropriate, you will not be charged for this service.\"      Past medical history, social history, family history, allergy and medications were reviewed and updated as appropriate.  Reviewed pertinent labs, notes, imaging studies personally.    Name: Steve \"Ciao\" Cathy  F/u for Diabetes and hypothyroidism.  HPI:  For f/u of DM.    has a past medical history of Cellulitis and abscess of trunk (06/27/2017), Depression, Depressive disorder, Hyperlipidemia LDL goal <100 (10/31/2010), Hypertension goal BP (blood pressure) < 140/90 (03/17/2011), Hypothyroidism (01/12/2010), Morbid obesity due to excess calories (H) (01/12/2010), Neuropathy in diabetes (H) (01/12/2010), Obesity (01/12/2010), Sleep apnea (01/12/2010), and Type 2 diabetes, HbA1C goal < 8% (H) (03/08/2011).    He has challenges with blood sugar testing due to " limited use of his hands (neuropathy+ contractures + tremor - both significant) and increasing difficulty with ADL's. Can't test his blood sugars using a meter due to the above.   He also has an hard time inserting the Nina sensors due to neuropathy and tremor.  History of poor compliance in past.  Was started on Novolog in past but not currently taking it.  He has stopping drinking pop since Sep 2021.  Exercise is limited.  CKD stage 2- followed by nephrology.  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  He underwent I&D and he was on partial amputation.  Earlier noted mild hyperkalemia which since has resolved.    Was using nina but currently not using. He has supplies at home.  Diet- not focused on ADA diet  He was on Jardaince-- not taking as it was expensive. He reports that other SGLT-2 inh are also expensive.  History of intermittent hyperkalemia.   Appetite is OK.  Weight: stable  Wt Readings from Last 2 Encounters:   09/09/24 107.5 kg (237 lb)   07/09/24 109.4 kg (241 lb 3.2 oz)     1. Type 2 DM:  Orginally diagnosed at the age of 33 or 38.  Was prediabetic prior, was not particularly symptomatic at the time, was noted on routine lab work    Current Regimen:   Metformin 1000 mg BID  Glipizide XL 10 mg/day in AM with food  Lantus 40 units at night    yes:     Diabetes Medication(s)       Biguanides       metFORMIN (GLUCOPHAGE) 1000 MG tablet Take 1 tablet (1,000 mg) by mouth 2 times daily (with meals)       Insulin       insulin glargine 100 UNIT/ML pen INJECT 40 UNITS SUBCUTANEOUS DAILY       Sulfonylureas       glipiZIDE (GLUCOTROL XL) 10 MG 24 hr tablet TAKE 1 TABLET (10 MG) BY MOUTH DAILY.              The plan was to start Trulicity but the copay was not affordable ($100 per refill).  Jardiance was expensive as noted above.  Reports that other SGLT2 inh are also not covered by insurance.    yes:     Diabetes Medication(s)       Biguanides       metFORMIN  (GLUCOPHAGE) 1000 MG tablet Take 1 tablet (1,000 mg) by mouth 2 times daily (with meals)       Insulin       insulin glargine 100 UNIT/ML pen INJECT 40 UNITS SUBCUTANEOUS DAILY       Sulfonylureas       glipiZIDE (GLUCOTROL XL) 10 MG 24 hr tablet TAKE 1 TABLET (10 MG) BY MOUTH DAILY.          BS checks: Nina  Nina Download: Blood glucose data reviewed personally. See nursing note from this encounter for details.  Unable to use glucose meter - he has a hard time doing fingerstick testing due to his tremor.    Complications:   Diabetes Complications  Description / Detail    Diabetic Retinopathy  No retinopathy,last exam 2/2020, [facility]   CAD / PAD  No   Neuropathy  Yes + diabetic neuropathy: numbness hands and feet.  Saw podiatry, Dr. Mathis, 1/27/2018- using diabetic shoes   Nephropathy / Microalbuminuria  Yes, elevated urine microalbumin   Gastroparesis  No   Hypoglycemia [place]  Not sure, gets 'kind of dizzy' when blood sugar is low but reports history of low blood sugars without symptoms     2. Hypertension: Blood Pressure:   BP Readings from Last 3 Encounters:   07/09/24 126/64   05/22/24 (!) 170/81   02/01/24 136/71   Blood pressure medications include atenolol 25 mg qd and losartan 50 mg every day.   3. Hyperlipidemia: Takes simvastatin 20 mg for lipid control.       4.  Hypothyroidism. Currently treated with levoxyl (KAMERON) 137 mcg daily. Takes the levoxyl first thing in the morning and waits 30+ minutes before eating breakfast.   1/2023 labs in range.  PMH/PSH:  Past Medical History:   Diagnosis Date    Cellulitis and abscess of trunk 06/27/2017    Depression     Depressive disorder     Hyperlipidemia LDL goal <100 10/31/2010    Hypertension goal BP (blood pressure) < 140/90 03/17/2011    Hypothyroidism 01/12/2010    Morbid obesity due to excess calories (H) 01/12/2010    Neuropathy in diabetes (H) 01/12/2010    Obesity 01/12/2010    Sleep apnea 01/12/2010    CPAP    Type 2 diabetes, HbA1C goal < 8% (H)  03/08/2011     Past Surgical History:   Procedure Laterality Date    BIOPSY      BURSECTOMY KNEE Right 05/20/2024    Procedure: Excisional prepatellar bursectomy, right knee;  Surgeon: Justin Bo MD;  Location: [facility] OR    COLONOSCOPY      COSMETIC SURGERY      EYE SURGERY      GENITOURINARY SURGERY      surg for undescended testicle    IRRIGATION AND DEBRIDEMENT HAND, COMBINED Right 12/23/2022    Procedure: Right long finger irrigation and debridement with partial amputation of the right long finger. ;  Surgeon: Colby English MD;  Location: [facility] OR    REPAIR HAMMER TOE BILATERAL  05/16/2013    Procedure: REPAIR HAMMER TOE BILATERAL;  Flexor Tenotomy Toes 2,3,4,5 Bilateral Feet;  Surgeon: Saad Bangura DPM;  Location: [facility] OR     Family Hx:  Family History   Problem Relation Age of Onset    Breast Cancer Mother     Hypertension Mother     Thyroid Disease Mother     Depression Mother     Alzheimer Disease Mother 82    Obesity Mother     Cancer - colorectal Father     Thyroid Disease Father     Depression Father     Other - See Comments Father         bladder polyps    Prostate Cancer Father     Other Cancer Father     Heart Disease Maternal Grandmother         CHF    Circulatory Paternal Grandmother     Cancer Paternal Grandfather     Diabetes Brother         Brother    Depression Brother     Diabetes Brother     Asthma Brother     Depression Brother     Anxiety Disorder Brother     Mental Illness Brother     Diabetes Other         Great Aunt     Thyroid disease: Yes: mother          DM2: Yes: one brother with type 1 diabetes and one brother with type 2 diabetes, paternal aunt with DM2          Autoimmune: DM1, SLE, RA, Vitiligo Yes: brother with DM1    Social Hx:  Social History     Socioeconomic History    Marital status:      Spouse name: Sri    Number of children: 2    Years of education: Not on file    Highest education level: Associate degree: occupational, technical, or vocational  program   Occupational History    Occupation:      Employer: NONE      Comment: [employer]   Tobacco Use    Smoking status: Never    Smokeless tobacco: Never    Tobacco comments:     Smoked for about 6 months when I was 16 but haven't touched them since   Vaping Use    Vaping status: Never Used   Substance and Sexual Activity    Alcohol use: Yes     Comment: Occassionally    Drug use: Never    Sexual activity: Not Currently     Partners: Female     Birth control/protection: Abstinence   Other Topics Concern    Parent/sibling w/ CABG, MI or angioplasty before 65F 55M? No   Social History Narrative    Not on file     Social Determinants of Health     Financial Resource Strain: Low Risk  (7/5/2024)    Financial Resource Strain     Within the past 12 months, have you or your family members you live with been unable to get utilities (heat, electricity) when it was really needed?: No   Food Insecurity: Low Risk  (7/5/2024)    Food Insecurity     Within the past 12 months, did you worry that your food would run out before you got money to buy more?: No     Within the past 12 months, did the food you bought just not last and you didn t have money to get more?: No   Transportation Needs: Low Risk  (7/5/2024)    Transportation Needs     Within the past 12 months, has lack of transportation kept you from medical appointments, getting your medicines, non-medical meetings or appointments, work, or from getting things that you need?: No   Physical Activity: Inactive (7/5/2024)    Exercise Vital Sign     Days of Exercise per Week: 0 days     Minutes of Exercise per Session: 0 min   Stress: No Stress Concern Present (7/5/2024)    [place] [place] [employer] - Occupational Stress Questionnaire     Feeling of Stress : Only a little   Social Connections: Socially Integrated (2/8/2024)    [facility]  "Connections     Frequency of Communication with Friends and Family: 0   Interpersonal Safety: Low Risk  (7/9/2024)    Interpersonal Safety     Do you feel physically and emotionally safe where you currently live?: Yes     Within the past 12 months, have you been hit, slapped, kicked or otherwise physically hurt by someone?: No     Within the past 12 months, have you been humiliated or emotionally abused in other ways by your partner or ex-partner?: No   Housing Stability: Low Risk  (7/5/2024)    Housing Stability     Do you have housing? : Yes     Are you worried about losing your housing?: No          MEDICATIONS:  has a current medication list which includes the following prescription(s): acetaminophen, aspirin, atenolol, calcium carb-cholecalciferol, freestyle juan 2 reader, freestyle juan 2 sensor, famotidine, ferrous sulfate, finasteride, gabapentin, gabapentin, glipizide, insulin glargine, b-d u/f, latanoprost, levoxyl, losartan, metformin, mirtazapine, mirtazapine, multi-vitamin, pantoprazole, senna-docusate, simvastatin, and vilazodone.    ROS     ROS: 10 point ROS neg other than the symptoms noted above in the HPI.    PE:  Ht 1.664 m (5' 5.5\")   Wt 107.5 kg (237 lb)   BMI 38.84 kg/m    GENERAL: healthy, alert and no distress  EYES: Eyes grossly normal to inspection, conjunctivae and sclerae normal  ENT: no nose swelling, nasal discharge.  Thyroid: no apparent thyroid nodules  RESP: no audible wheeze, cough, or visible cyanosis.  No visible retractions or increased work of breathing.  Able to speak fully in complete sentences.  ABDO: not evaluated.  EXTREMITIES: no hand tremors.  NEURO: Cranial nerves grossly intact, mentation intact and speech normal  SKIN: No apparent skin lesions, rash or edema seen   PSYCH: mentation appears normal, affect normal/bright, judgement and insight intact, normal speech and appearance well-groomed.    LABS:    Component      Latest Ref Rng & Units 8/29/2019   Glucose 316 "   C-Peptide      0.9 - 6.9 ng/mL 2.5     A1c:  Lab Results   Component Value Date    A1C 7.6 07/09/2024    A1C 8.0 05/14/2024    A1C 7.6 02/26/2024    A1C 8.2 12/05/2023    A1C 9.4 08/31/2023    A1C 7.4 02/22/2021    A1C 9.9 03/12/2020    A1C 11.7 12/05/2019    A1C 11.6 08/29/2019    A1C 11.8 12/14/2018     Basic Metabolic Panel:  Creatinine   Date Value Ref Range Status   09/06/2024 1.74 (H) 0.67 - 1.17 mg/dL Final   01/16/2020 1.01 0.66 - 1.25 mg/dL Final     Urine microabumin:  Lab Results   Component Value Date    UMALCR 151.99 05/16/2024    UMALCR 120.00 07/30/2021    UMALCR 81.46 03/05/2020       LFTS/Cholesterol Panel:  Recent Labs   Lab Test 02/26/24  1145 01/27/23  1126   CHOL 206* 127   HDL 26* 33*   * 71   TRIG 252* 113       Thyroid Function:   Latest Ref Rng 8/26/2024  2:02 PM   ENDO THYROID LABS[facility]     TSH 0.30 - 4.20 uIU/mL 2.37    Free T3 2.3 - 4.2 pg/mL    FREE T4 0.90 - 1.70 ng/dL 1.47        Component    Latest Ref Rng & Units 10/19/2018   Thyroid Peroxidase Antibody    <35 IU/mL 11   Thyroglobulin Antibody    <40 IU/mL <20   Thyroid Stim Immunog    <=1.3 TSI index <1.0     All pertinent notes, labs, and images personally reviewed by me.     A/P  Mr.Walter Morgan is a 64 year old evaluated via phone visit for the management of:    1. DM2 - Under poor control. A1c 7.6%.  Diabetes is complicated by neuropathy and nephropathy/elevated urine microalbumin.  He is followed by nephrology.  Can't test his blood sugars using a meter due to the above and will benefit from use of continuous glucose monitor.  Noted post breakfast high BG. Overall Bg are high and average B G is 190  Plan:  Discussed diagnosis, pathophysiology, management and treatment options of condition with pt.  I also discussed importance of strict blood sugar control to prevent complications associated with uncontrolled diabetes.    Continue Metformin 1000 mg twice a day ( need to monitor creatinine and adjust metformin dose  accordingly)  Continue Glipizide XL 10 mg/day in AM with food  Increase Lantus to 50 units at night.  Consider SGLT-2 inh in future at smaller dose ( h/o hyperkalemia)  Decrease carbs at breakfast.  Continue to use juan 2.  Check if juan 3 is covered and inform us if you need a Rx.    Follow up with Cara [facility]S in 4-5 months with labs prior.  Repeat labs and follow up with  in 4-5 months after that.  Please make a lab appointment for blood work and follow up clinic appointment in 1 week after that to discuss results.    2. Hypothyroidism.  Currently treated with Levoxyl (KAMERON) 137 mcg/day.  Clinically and biochemically euthyroid.   8/2024 Labs in range.  Plan:  Discussed diagnosis, pathophysiology, management and treatment options of condition with pt.  Continue Levoxyl 137 mcg/day.   Labs in 1 year (8/2025 onwards) or sooner if concerns.    Discussed s/s of hypothyroidism and hyperthyroidism to watch for.  The patient indicates understanding of these issues and agrees with the plan.    3. Hyperlipidemia - On statin therapy. . Managed by PCP. Not discussed.    4. Prevention:  Opthalmology-Yes: recommend annually  Dental-Yes:recommend twice a year  ASA-Yes, 81 mg qd   Smoking- No    Most Recent Immunizations   Administered Date(s) Administered    COVID-19 12+ (Pfizer) 06/04/2024    COVID-19 Bivalent 12+ (Pfizer) 06/30/2023    COVID-19 MONOVALENT 12+ (Pfizer) 10/15/2022    COVID-19 Monovalent Booster 18+ (Moderna) 04/12/2022    COVID-19 Vaccine (Vi) 03/25/2021    DTaP, Unspecified 11/08/2010    Flu, Unspecified 11/15/2016    [id number] Novel Flu 11/04/2009    Hepatitis A (ADULT 19+) 04/17/2019    Influenza (H1N1) 11/04/2009    Influenza (IIV3) PF 10/01/2012    Influenza Vaccine 18-64 (Flublok) 10/15/2022    Influenza Vaccine 65+ (Fluzone HD) 10/04/2023, 10/04/2023    Influenza Vaccine >6 months,quad, PF 09/07/2017    Influenza Vaccine, 6+MO IM (QUADRIVALENT W/PRESERVATIVES) 11/15/2016    MMR  03/21/1995    Pneumococcal (PCV 7) 06/15/2022    Pneumococcal 20 valent Conjugate (Prevnar 20) 06/15/2022    Pneumococcal 23 valent 10/05/2012    RSV Vaccine (Abrysvo) 10/11/2023    TD,PF 7+ (Tenivac) 03/21/1995    TDAP (Adacel,Boostrix) 09/24/2021    TDAP Vaccine (Adacel) 11/08/2010    Typhoid IM 10/16/2018    Zoster recombinant adjuvanted (SHINGRIX) 12/03/2020     Discussed indications, risks and benefits of all medications prescribed, and answered questions to patient's satisfaction.  The longitudinal plan of care for the diagnosis(es)/condition(s) as documented were addressed during this visit. Due to the added complexity in care, I will continue to support Caio in the subsequent management and with ongoing continuity of care.  All questions were answered.  The patient indicates understanding of the above issues and agrees with the plan set forth.    Follow-up:  As noted in AVS    Cindi Meraz MD  [facility]  CC: Lisa Pierre

## 2024-09-09 NOTE — LETTER
"9/9/2024      Steve Morgan  [id number]  [facility] [id number]      Dear Colleague,    Thank you for referring your patient, Steve Morgan, to the [facility]. Please see a copy of my visit note below.    Outcome for 08/29/24 7:41 AM: [employer] message sent  Carmelita Mendoza MA  Outcome for 09/03/24 8:56 AM: Replied to [employer] message  Carmelita Mendoza MA  Outcome for 09/05/24 9:45 AM: Per patient, will send BG readings via [employer]  Bobbi Grossaint, VF  Outcome for 09/05/24 11:37 AM: Replied to [employer] message  Carmelita Mendoza MA  Outcome for 09/05/24 12:22 PM: Sent invite to connect to clinic for sharing  Carmelita Mendoza MA  Outcome for 09/05/24 12:58 PM: Replied to [employer] message  Carmelita Mendoza MA  Outcome for 09/05/24 2:56 PM: Nina emailed to provider  Carmelita Mendoza MA            THIS IS A VIDEO VISIT:    Phone call visit/virtual visit encounter:    Name of patient: Steve Morgan    Date of encounter: 9/9/2024    Time of start of video visit: 1:34    Video started: 1:41    Video ended: 1:55    Provider location: working from home/ [facility]    Patient location: patients home.    Mode of transmission: [employer] video/ [employer]    Verbal consent: obtained before starting visit. Pt is agreeable.      The patient has been notified of following:      \"This VIDEO visit will be conducted via a call between you and your physician/provider. We have found that certain health care needs can be provided without the need for a physical exam.  This service lets us provide the care you need with a short phone conversation.  If a prescription is necessary we can send it directly to your pharmacy.  If lab work is needed we can place an order for that and you can then stop by our lab to have the test done at a later time.     With new updates with corona virus patient might be billed as clinic visit.     If during the course of the call the physician/provider feels a " "telephone visit is not appropriate, you will not be charged for this service.\"      Past medical history, social history, family history, allergy and medications were reviewed and updated as appropriate.  Reviewed pertinent labs, notes, imaging studies personally.    Name: Steve \"Caio\" Cathy  F/u for Diabetes and hypothyroidism.  HPI:  For f/u of DM.    has a past medical history of Cellulitis and abscess of trunk (06/27/2017), Depression, Depressive disorder, Hyperlipidemia LDL goal <100 (10/31/2010), Hypertension goal BP (blood pressure) < 140/90 (03/17/2011), Hypothyroidism (01/12/2010), Morbid obesity due to excess calories (H) (01/12/2010), Neuropathy in diabetes (H) (01/12/2010), Obesity (01/12/2010), Sleep apnea (01/12/2010), and Type 2 diabetes, HbA1C goal < 8% (H) (03/08/2011).    He has challenges with blood sugar testing due to limited use of his hands (neuropathy+ contractures + tremor - both significant) and increasing difficulty with ADL's. Can't test his blood sugars using a meter due to the above.   He also has an hard time inserting the Nina sensors due to neuropathy and tremor.  History of poor compliance in past.  Was started on Novolog in past but not currently taking it.  He has stopping drinking pop since Sep 2021.  Exercise is limited.  CKD stage 2- followed by nephrology.  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  He underwent I&D and he was on partial amputation.  Earlier noted mild hyperkalemia which since has resolved.    Was using nina but currently not using. He has supplies at home.  Diet- not focused on ADA diet  He was on Jardaince-- not taking as it was expensive. He reports that other SGLT-2 inh are also expensive.  History of intermittent hyperkalemia.   Appetite is OK.  Weight: stable  Wt Readings from Last 2 Encounters:   09/09/24 107.5 kg (237 lb)   07/09/24 109.4 kg (241 lb 3.2 oz)     1. Type 2 DM:  Orginally diagnosed at the age of " 33 or 38.  Was prediabetic prior, was not particularly symptomatic at the time, was noted on routine lab work    Current Regimen:   Metformin 1000 mg BID  Glipizide XL 10 mg/day in AM with food  Lantus 40 units at night    yes:     Diabetes Medication(s)       Biguanides       metFORMIN (GLUCOPHAGE) 1000 MG tablet Take 1 tablet (1,000 mg) by mouth 2 times daily (with meals)       Insulin       insulin glargine 100 UNIT/ML pen INJECT 40 UNITS SUBCUTANEOUS DAILY       Sulfonylureas       glipiZIDE (GLUCOTROL XL) 10 MG 24 hr tablet TAKE 1 TABLET (10 MG) BY MOUTH DAILY.              The plan was to start Trulicity but the copay was not affordable ($100 per refill).  Jardiance was expensive as noted above.  Reports that other SGLT2 inh are also not covered by insurance.    yes:     Diabetes Medication(s)       Biguanides       metFORMIN (GLUCOPHAGE) 1000 MG tablet Take 1 tablet (1,000 mg) by mouth 2 times daily (with meals)       Insulin       insulin glargine 100 UNIT/ML pen INJECT 40 UNITS SUBCUTANEOUS DAILY       Sulfonylureas       glipiZIDE (GLUCOTROL XL) 10 MG 24 hr tablet TAKE 1 TABLET (10 MG) BY MOUTH DAILY.          BS checks: Nina  Nina Download: Blood glucose data reviewed personally. See nursing note from this encounter for details.  Unable to use glucose meter - he has a hard time doing fingerstick testing due to his tremor.    Complications:   Diabetes Complications  Description / Detail    Diabetic Retinopathy  No retinopathy,last exam 2/2020, [facility]   CAD / PAD  No   Neuropathy  Yes + diabetic neuropathy: numbness hands and feet.  Saw podiatry, Dr. Mathis, 1/27/2018- using diabetic shoes   Nephropathy / Microalbuminuria  Yes, elevated urine microalbumin   Gastroparesis  No   Hypoglycemia [place]  Not sure, gets 'kind of dizzy' when blood sugar is low but reports history of low blood sugars without symptoms     2. Hypertension: Blood Pressure:   BP Readings from Last 3 Encounters:   07/09/24 126/64    05/22/24 (!) 170/81   02/01/24 136/71   Blood pressure medications include atenolol 25 mg qd and losartan 50 mg every day.   3. Hyperlipidemia: Takes simvastatin 20 mg for lipid control.       4.  Hypothyroidism. Currently treated with levoxyl (KAMERON) 137 mcg daily. Takes the levoxyl first thing in the morning and waits 30+ minutes before eating breakfast.   1/2023 labs in range.  PMH/PSH:  Past Medical History:   Diagnosis Date     Cellulitis and abscess of trunk 06/27/2017     Depression      Depressive disorder      Hyperlipidemia LDL goal <100 10/31/2010     Hypertension goal BP (blood pressure) < 140/90 03/17/2011     Hypothyroidism 01/12/2010     Morbid obesity due to excess calories (H) 01/12/2010     Neuropathy in diabetes (H) 01/12/2010     Obesity 01/12/2010     Sleep apnea 01/12/2010    CPAP     Type 2 diabetes, HbA1C goal < 8% (H) 03/08/2011     Past Surgical History:   Procedure Laterality Date     BIOPSY       BURSECTOMY KNEE Right 05/20/2024    Procedure: Excisional prepatellar bursectomy, right knee;  Surgeon: Justin Bo MD;  Location: [facility] OR     COLONOSCOPY       COSMETIC SURGERY       EYE SURGERY       GENITOURINARY SURGERY      surg for undescended testicle     IRRIGATION AND DEBRIDEMENT HAND, COMBINED Right 12/23/2022    Procedure: Right long finger irrigation and debridement with partial amputation of the right long finger. ;  Surgeon: Colby English MD;  Location: [facility] OR     REPAIR HAMMER TOE BILATERAL  05/16/2013    Procedure: REPAIR HAMMER TOE BILATERAL;  Flexor Tenotomy Toes 2,3,4,5 Bilateral Feet;  Surgeon: Saad Bangura DPM;  Location: [facility] OR     Family Hx:  Family History   Problem Relation Age of Onset     Breast Cancer Mother      Hypertension Mother      Thyroid Disease Mother      Depression Mother      Alzheimer Disease Mother 82     Obesity Mother      Cancer - colorectal Father      Thyroid Disease Father      Depression Father      Other - See Comments Father          bladder polyps     Prostate Cancer Father      Other Cancer Father      Heart Disease Maternal Grandmother         CHF     Circulatory Paternal Grandmother      Cancer Paternal Grandfather      Diabetes Brother         Brother     Depression Brother      Diabetes Brother      Asthma Brother      Depression Brother      Anxiety Disorder Brother      Mental Illness Brother      Diabetes Other         Great Aunt     Thyroid disease: Yes: mother          DM2: Yes: one brother with type 1 diabetes and one brother with type 2 diabetes, paternal aunt with DM2          Autoimmune: DM1, SLE, RA, Vitiligo Yes: brother with DM1    Social Hx:  Social History     Socioeconomic History     Marital status:      Spouse name: Sri     Number of children: 2     Years of education: Not on file     Highest education level: Associate degree: occupational, technical, or vocational program   Occupational History     Occupation:      Employer: NONE      Comment: [employer]   Tobacco Use     Smoking status: Never     Smokeless tobacco: Never     Tobacco comments:     Smoked for about 6 months when I was 16 but haven't touched them since   Vaping Use     Vaping status: Never Used   Substance and Sexual Activity     Alcohol use: Yes     Comment: Occassionally     Drug use: Never     Sexual activity: Not Currently     Partners: Female     Birth control/protection: Abstinence   Other Topics Concern     Parent/sibling w/ CABG, MI or angioplasty before 65F 55M? No   Social History Narrative     Not on file     Social Determinants of Health     Financial Resource Strain: Low Risk  (7/5/2024)    Financial Resource Strain      Within the past 12 months, have you or your family members you live with been unable to get utilities (heat, electricity) when it was really needed?: No   Food Insecurity: Low Risk  (7/5/2024)    Food Insecurity      Within the past 12 months, did you worry that your food would run out before you got  money to buy more?: No      Within the past 12 months, did the food you bought just not last and you didn t have money to get more?: No   Transportation Needs: Low Risk  (7/5/2024)    Transportation Needs      Within the past 12 months, has lack of transportation kept you from medical appointments, getting your medicines, non-medical meetings or appointments, work, or from getting things that you need?: No   Physical Activity: Inactive (7/5/2024)    Exercise Vital Sign      Days of Exercise per Week: 0 days      Minutes of Exercise per Session: 0 min   Stress: No Stress Concern Present (7/5/2024)    [facility] of Occupational Health - Occupational Stress Questionnaire      Feeling of Stress : Only a little   Social Connections: Socially Integrated (2/8/2024)    [facility] [employer]    Social Connections      Frequency of Communication with Friends and Family: 0   Interpersonal Safety: Low Risk  (7/9/2024)    Interpersonal Safety      Do you feel physically and emotionally safe where you currently live?: Yes      Within the past 12 months, have you been hit, slapped, kicked or otherwise physically hurt by someone?: No      Within the past 12 months, have you been humiliated or emotionally abused in other ways by your partner or ex-partner?: No   Housing Stability: Low Risk  (7/5/2024)    Housing Stability      Do you have housing? : Yes      Are you worried about losing your housing?: No          MEDICATIONS:  has a current medication list which includes the following prescription(s): acetaminophen, aspirin, atenolol, calcium carb-cholecalciferol, freestyle juan 2 reader, freestyle juan 2 sensor, famotidine, ferrous sulfate, finasteride, gabapentin, gabapentin, glipizide, insulin glargine, b-d u/f, latanoprost, levoxyl, losartan, metformin, mirtazapine, mirtazapine, multi-vitamin, pantoprazole, senna-docusate, simvastatin,  "and vilazodone.    ROS     ROS: 10 point ROS neg other than the symptoms noted above in the HPI.    PE:  Ht 1.664 m (5' 5.5\")   Wt 107.5 kg (237 lb)   BMI 38.84 kg/m    GENERAL: healthy, alert and no distress  EYES: Eyes grossly normal to inspection, conjunctivae and sclerae normal  ENT: no nose swelling, nasal discharge.  Thyroid: no apparent thyroid nodules  RESP: no audible wheeze, cough, or visible cyanosis.  No visible retractions or increased work of breathing.  Able to speak fully in complete sentences.  ABDO: not evaluated.  EXTREMITIES: no hand tremors.  NEURO: Cranial nerves grossly intact, mentation intact and speech normal  SKIN: No apparent skin lesions, rash or edema seen   PSYCH: mentation appears normal, affect normal/bright, judgement and insight intact, normal speech and appearance well-groomed.    LABS:    Component      Latest Ref Rng & Units 8/29/2019   Glucose 316   C-Peptide      0.9 - 6.9 ng/mL 2.5     A1c:  Lab Results   Component Value Date    A1C 7.6 07/09/2024    A1C 8.0 05/14/2024    A1C 7.6 02/26/2024    A1C 8.2 12/05/2023    A1C 9.4 08/31/2023    A1C 7.4 02/22/2021    A1C 9.9 03/12/2020    A1C 11.7 12/05/2019    A1C 11.6 08/29/2019    A1C 11.8 12/14/2018     Basic Metabolic Panel:  Creatinine   Date Value Ref Range Status   09/06/2024 1.74 (H) 0.67 - 1.17 mg/dL Final   01/16/2020 1.01 0.66 - 1.25 mg/dL Final     Urine microabumin:  Lab Results   Component Value Date    UMALCR 151.99 05/16/2024    UMALCR 120.00 07/30/2021    UMALCR 81.46 03/05/2020       LFTS/Cholesterol Panel:  Recent Labs   Lab Test 02/26/24  1145 01/27/23  1126   CHOL 206* 127   HDL 26* 33*   * 71   TRIG 252* 113       Thyroid Function:   Latest Ref Rng 8/26/2024  2:02 PM   ENDO THYROID LABS-[facility]     TSH 0.30 - 4.20 uIU/mL 2.37    Free T3 2.3 - 4.2 pg/mL    FREE T4 0.90 - 1.70 ng/dL 1.47        Component    Latest Ref Rng & Units 10/19/2018   Thyroid Peroxidase Antibody    <35 IU/mL 11   Thyroglobulin " Antibody    <40 IU/mL <20   Thyroid Stim Immunog    <=1.3 TSI index <1.0     All pertinent notes, labs, and images personally reviewed by me.     A/P  Mr.Walter Morgan is a 64 year old evaluated via phone visit for the management of:    1. DM2 - Under poor control. A1c 7.6%.  Diabetes is complicated by neuropathy and nephropathy/elevated urine microalbumin.  He is followed by nephrology.  Can't test his blood sugars using a meter due to the above and will benefit from use of continuous glucose monitor.  Noted post breakfast high BG. Overall Bg are high and average B G is 190  Plan:  Discussed diagnosis, pathophysiology, management and treatment options of condition with pt.  I also discussed importance of strict blood sugar control to prevent complications associated with uncontrolled diabetes.    Continue Metformin 1000 mg twice a day ( need to monitor creatinine and adjust metformin dose accordingly)  Continue Glipizide XL 10 mg/day in AM with food  Increase Lantus to 50 units at night.  Consider SGLT-2 inh in future at smaller dose ( h/o hyperkalemia)  Decrease carbs at breakfast.  Continue to use juan 2.  Check if juan 3 is covered and inform us if you need a Rx.    Follow up with Cara SMALLS in 4-5 months with labs prior.  Repeat labs and follow up with  in 4-5 months after that.  Please make a lab appointment for blood work and follow up clinic appointment in 1 week after that to discuss results.    2. Hypothyroidism.  Currently treated with Levoxyl (KAMERON) 137 mcg/day.  Clinically and biochemically euthyroid.   8/2024 Labs in range.  Plan:  Discussed diagnosis, pathophysiology, management and treatment options of condition with pt.  Continue Levoxyl 137 mcg/day.   Labs in 1 year (8/2025 onwards) or sooner if concerns.    Discussed s/s of hypothyroidism and hyperthyroidism to watch for.  The patient indicates understanding of these issues and agrees with the plan.    3. Hyperlipidemia - On  statin therapy. . Managed by PCP. Not discussed.    4. Prevention:  Opthalmology-Yes: recommend annually  Dental-Yes:recommend twice a year  ASA-Yes, 81 mg qd   Smoking- No    Most Recent Immunizations   Administered Date(s) Administered     COVID-19 12+ (Pfizer) 06/04/2024     COVID-19 Bivalent 12+ (Pfizer) 06/30/2023     COVID-19 MONOVALENT 12+ (Pfizer) 10/15/2022     COVID-19 Monovalent Booster 18+ (Moderna) 04/12/2022     COVID-19 Vaccine (Vi) 03/25/2021     DTaP, Unspecified 11/08/2010     Flu, Unspecified 11/15/2016     Q2a5-48 Novel Flu 11/04/2009     Hepatitis A (ADULT 19+) 04/17/2019     Influenza (H1N1) 11/04/2009     Influenza (IIV3) PF 10/01/2012     Influenza Vaccine 18-64 (Flublok) 10/15/2022     Influenza Vaccine 65+ (Fluzone HD) 10/04/2023, 10/04/2023     Influenza Vaccine >6 months,quad, PF 09/07/2017     Influenza Vaccine, 6+MO IM (QUADRIVALENT W/PRESERVATIVES) 11/15/2016     MMR 03/21/1995     Pneumococcal (PCV 7) 06/15/2022     Pneumococcal 20 valent Conjugate (Prevnar 20) 06/15/2022     Pneumococcal 23 valent 10/05/2012     RSV Vaccine (Abrysvo) 10/11/2023     TD,PF 7+ (Tenivac) 03/21/1995     TDAP (Adacel,Boostrix) 09/24/2021     TDAP Vaccine (Adacel) 11/08/2010     Typhoid IM 10/16/2018     Zoster recombinant adjuvanted (SHINGRIX) 12/03/2020     Discussed indications, risks and benefits of all medications prescribed, and answered questions to patient's satisfaction.  The longitudinal plan of care for the diagnosis(es)/condition(s) as documented were addressed during this visit. Due to the added complexity in care, I will continue to support Caio in the subsequent management and with ongoing continuity of care.  All questions were answered.  The patient indicates understanding of the above issues and agrees with the plan set forth.    Follow-up:  As noted in AVS    Cindi Meraz MD  Endocrinology   Raj Rojas/Damari  CC: Lisa Pierre            Again, thank  you for allowing me to participate in the care of your patient.        Sincerely,        Cindi Meraz MD

## 2024-09-11 DIAGNOSIS — N18.32 STAGE 3B CHRONIC KIDNEY DISEASE (CKD) (H): Primary | ICD-10-CM

## 2024-09-16 NOTE — PROGRESS NOTES
[facility] Psychiatry Services [facility]  9/16/2024      Behavioral Health Clinician Progress Note    Patient Name: Steve Morgan           Service Type:  Individual      [facility] ([facility] reached)     Session Start Time: 235pm  Session End Time: 254pm      Session Length: 16 - 37      Attendees: Patient     Service Modality:  Video Visit:      Provider verified identity through the following two step process.  Patient provided:  Patient photo and Patient was verified at admission/transfer    Telemedicine Visit: The patient's condition can be safely assessed and treated via synchronous audio and visual telemedicine encounter.      Reason for Telemedicine Visit: Services only offered telehealth    Originating Site (Patient Location): Patient's home    Distant Site (Provider Location): Provider Remote Setting- Home Office    Consent:  The patient/guardian has verbally consented to: the potential risks and benefits of telemedicine (video visit) versus in person care; bill my insurance or make self-payment for services provided; and responsibility for payment of non-covered services.     Patient would like the video invitation sent by:  My Chart    Mode of Communication:  Video Conference via [facility]    Distant Location (Provider):  Off-site    As the provider I attest to compliance with applicable laws and regulations related to telemedicine.    Visit Activities (Refresh list every visit): [facility] Only    Diagnostic Assessment Date: 1/31/24 Margaret Asif   Treatment Plan Review Date: 12/16/24  See Flowsheets for today's PHQ-9 and JOSE MANUEL-7 results  Previous PHQ-9:       7/9/2024     9:38 AM 9/9/2024     1:08 PM 9/17/2024     2:17 PM   PHQ-9 SCORE   PHQ-9 Total Score Jarrod 16 (Moderately severe depression)  8 (Mild depression)   PHQ-9 Total Score 16 8 8     Previous JOSE MANUEL-7:       6/30/2023     8:21 AM 3/12/2024    10:24 AM 7/9/2024     9:39 AM   JOSE MANUEL-7 SCORE   Total Score  3 (minimal anxiety) 6 (mild anxiety) 7 (mild anxiety)   Total Score 3 6 7   See JOSE MANUEL 2 below for current scores.     SHANNON LEVEL:      1/15/2020     8:18 PM 12/16/2022     2:26 PM   SHANNON Score (Last Two)   SHANNON Raw Score 36 34   Activation Score 75.5 68.9   SHANNON Level 4 3       DATA  Extended Session (60+ minutes): No  Interactive Complexity: No  Crisis: No  [facility] Patient: No    Treatment Objective(s) Addressed in This Session:  Target Behavior(s):  improve stress and depressive sx    Depressed Mood: Decrease frequency and intensity of feeling down, depressed, hopeless  Improve concentration, focus, and mindfulness in daily activities     Current Stressors / Issues:  Questions/Thoughts for Dr. Pinto: get a 90 day supply, look at vilazodone alternative since this medication is expensive     Better/worse: learning skills, trying to practice mindfulness daily, irritability hasn't been too often     Current Symptoms: Pt reports that they are doing DBT and are seeing a therapist weekly. He has learned about mindfulness and is working on DEERMAN skill. He is trying to practice mindfulness everyday. Pt states that he has been practicing skills without knowing what it is called. He still gets irritable at times. He has ups and downs. It hasn't been a huge problem. Caio recognizes that he needs to listen to others finish talking before he interrupts. He is going to continue to work to improve this. Pt says that he is working to create a new routine and structure since prison. He is enjoying being in group.     Therapist: seeing DBT therapist  [facility] recommendations: continue to learn skills and use them     Progress towards goals: Pt reports that he is learning different skills to manage his mood and to be more intentional in his everyday interactions. He denies that irritability has been a major concern.       Progress on Treatment Objective(s) / Homework:  Satisfactory progress - ACTION (Actively working towards change);  Intervened by reinforcing change plan / affirming steps taken     DBT: Pt reports that he is learning different skills to manage his mood and to be more intentional in his everyday interactions.     [facility] celebrates skills that pt is learning and practicing in DBT.     Motivational Interviewing    MI Intervention: Co-Developed Goal: developing supports and community, and skills to manage mood and irritability, Expressed Empathy/Understanding, Supported Autonomy, Collaboration, Evocation, Permission to raise concern or advise, Open-ended questions, Reflections: simple and complex, Change talk (evoked), and Reframe     Change Talk Expressed by the Patient: Committment to change Activation Taking steps    Provider Response to Change Talk: E - Evoked more info from patient about behavior change, A - Affirmed patient's thoughts, decisions, or attempts at behavior change, R - Reflected patient's change talk, and S - Summarized patient's change talk statements    Also provided psychoeducation about behavioral health condition, symptoms, and treatment options    Assessments completed prior to visit:  The following assessments were completed by patient for this visit:  PHQ9:       3/12/2024    10:23 AM 4/10/2024     2:12 PM 5/1/2024    10:17 AM 6/18/2024     8:48 AM 7/9/2024     9:38 AM 9/9/2024     1:08 PM 9/17/2024     2:17 PM   PHQ-9 SCORE   PHQ-9 Total Score MyChart 8 (Mild depression) 17 (Moderately severe depression) 9 (Mild depression) 11 (Moderate depression) 16 (Moderately severe depression)  8 (Mild depression)   PHQ-9 Total Score 8 17 9 11 16 8 8     PROMIS 10-Global Health (only subscores and total score):       5/21/2023     4:35 PM 8/23/2023     2:16 PM 11/16/2023    10:10 AM 12/1/2023     4:00 PM 3/12/2024    10:26 AM 4/10/2024     2:15 PM 9/17/2024     2:20 PM   PROMIS-10 Scores Only   Global Mental Health Score 10 11 9  7 10    10 12    12   Global Physical Health Score 13 15 12  11 13    13 14    14   PROMIS  TOTAL - SUBSCORES 23 26 21  18 23    23 26    26       Information is confidential and restricted. Go to Review Flowsheets to unlock data.    Multiple values from one day are sorted in reverse-chronological order     GAD2:       5/21/2023     4:33 PM 8/23/2023     2:14 PM 11/16/2023    10:08 AM 2/26/2024     5:22 PM 5/31/2024    11:23 AM 6/18/2024     8:48 AM 9/17/2024     2:18 PM   JOSE MANUEL-2   Feeling nervous, anxious, or on edge 0 1 0 1 1 1 1   Not being able to stop or control worrying 0 0 0 0 1 1 1   JOSE MANUEL-2 Total Score 0 1 0 1    1 2 2    2 2    2       Care Plan review completed: Yes    Medication Review:  No changes to current psychiatric medication(s)    Medication Compliance:  Yes    Changes in Health Issues:   None reported    Chemical Use Review:   Substance Use: Chemical use reviewed, no active concerns identified      Tobacco Use: No current tobacco use.      Assessment: Current Emotional / Mental Status (status of significant symptoms):  Risk status (Self / Other harm or suicidal ideation)  Patient denies a history of suicidal ideation, suicide attempts, self-injurious behavior, homicidal ideation, homicidal behavior, and and other safety concerns   Patient denies current fears or concerns for personal safety.  Patient denies current or recent suicidal ideation or behaviors.  Patient denies current or recent homicidal ideation or behaviors.  Patient denies current or recent self injurious behavior or ideation.  Patient denies other safety concerns.  A safety and risk management plan has not been developed at this time, however patient was encouraged to call [place] [phone] should there be a change in any of these risk factors.    Appearance:   Appropriate , well groomed  Eye Contact:   Good   Psychomotor Behavior: Normal   Attitude:   Cooperative  Interested Friendly  Orientation:   All  Speech   Rate / Production: Normal    Volume:  Normal   Mood:    Normal  Affect:    Appropriate   Thought  Content:  Clear   Thought Form:  Coherent  Logical   Insight:    Good     Diagnoses:  1. Mild episode of recurrent major depressive disorder (H24)        Collateral Reports Completed:  Communicated with: Anant Pinto M.D.     Plan: (Homework, other):  Patient was given information about behavioral services and encouraged to schedule a follow up appointment with the clinic [facility] in 1 month.  He was also given information about mental health symptoms and treatment options .  CD Recommendations: No indications of CD issues.     Catia An, [facility]    ______________________________________________________________________    Integrated Primary Care Behavioral Health Treatment Plan    Patient's Name: Steve Morgan  YOB: 1960    Date of Creation: 1/11/2024  Date Treatment Plan Last Reviewed/Revised: 4/10/24; 9/17/2024    DSM5 Diagnoses:   1. Mild episode of recurrent major depressive disorder (H24)      Psychosocial / Contextual Factors: has wife, son, is reitired  PROMIS (reviewed every 90 days):   PROMIS 10-Global Health (only subscores and total score):       5/21/2023     4:35 PM 8/23/2023     2:16 PM 11/16/2023    10:10 AM 12/1/2023     4:00 PM 3/12/2024    10:26 AM 4/10/2024     2:15 PM 9/17/2024     2:20 PM   PROMIS-10 Scores Only   Global Mental Health Score 10 11 9  7 10    10 12    12   Global Physical Health Score 13 15 12  11 13    13 14    14   PROMIS TOTAL - SUBSCORES 23 26 21  18 23    23 26    26       Information is confidential and restricted. Go to Review Flowsheets to unlock data.    Multiple values from one day are sorted in reverse-chronological order       Referral / Collaboration:  Referral to another professional/service is not indicated at this time.    Anticipated number of session for this episode of care: 5-6  Anticipation frequency of session: Monthly  Anticipated Duration of each session: 16-37 minutes  Treatment plan will be reviewed in 90 days or when goals have been  "changed.       MeasurableTreatment Goal(s) related to diagnosis / functional impairment(s)  Goal 1: Patient will report improved irritability and depression     Pt will know he's met goal when \"I am learning mindfulness and other DBT skills. Irritability hasn't been a huge problem.\"     Objective #A (Patient Action)                          Patient will work to find 1-2 ways to reduce anger/irritability.   Status: Continue - Date: 4/10/2024; 9/17/2024    Intervention(s)  Therapist provide support through CBT and DBT.      Patient has reviewed and agreed to the above plan.      Catia An, [facility]   9/17/2024    "

## 2024-09-17 ENCOUNTER — VIRTUAL VISIT (OUTPATIENT)
Dept: PSYCHIATRY | Facility: CLINIC | Age: 64
End: 2024-09-17
Payer: MEDICARE

## 2024-09-17 ENCOUNTER — MYC MEDICAL ADVICE (OUTPATIENT)
Dept: PSYCHIATRY | Facility: CLINIC | Age: 64
End: 2024-09-17
Payer: MEDICARE

## 2024-09-17 ENCOUNTER — VIRTUAL VISIT (OUTPATIENT)
Dept: BEHAVIORAL HEALTH | Facility: CLINIC | Age: 64
End: 2024-09-17
Payer: MEDICARE

## 2024-09-17 DIAGNOSIS — F33.1 MAJOR DEPRESSIVE DISORDER, RECURRENT EPISODE, MODERATE (H): ICD-10-CM

## 2024-09-17 DIAGNOSIS — F33.0 MILD EPISODE OF RECURRENT MAJOR DEPRESSIVE DISORDER (H): Primary | ICD-10-CM

## 2024-09-17 PROCEDURE — G2211 COMPLEX E/M VISIT ADD ON: HCPCS | Mod: 95 | Performed by: PSYCHIATRY & NEUROLOGY

## 2024-09-17 PROCEDURE — 90832 PSYTX W PT 30 MINUTES: CPT | Mod: 95 | Performed by: COUNSELOR

## 2024-09-17 PROCEDURE — 99214 OFFICE O/P EST MOD 30 MIN: CPT | Mod: 95 | Performed by: PSYCHIATRY & NEUROLOGY

## 2024-09-17 RX ORDER — MIRTAZAPINE 30 MG/1
30 TABLET, FILM COATED ORAL AT BEDTIME
Qty: 90 TABLET | Refills: 0 | Status: SHIPPED | OUTPATIENT
Start: 2024-09-17 | End: 2024-10-02

## 2024-09-17 ASSESSMENT — PAIN SCALES - GENERAL: PAINLEVEL: NO PAIN (0)

## 2024-09-17 NOTE — PROGRESS NOTES
Virtual Visit Details    Type of service:  Video Visit   Video Start Time: 3:15 PM  Video End Time:3:36 PM    Originating Location (pt. Location): Home    Distant Location (provider location):  Off-site  Platform used for Video Visit: [facility] Psychiatry Consult Note    IDENTIFICATION   Name: Steve Morgan   : 1960/64 year old      Sex:    @ male          Telemedicine Visit: The patient's condition can be safely assessed and treated via synchronous audio and visual telemedicine encounter.        Face to Face/patient Contact total time: 21 minutes  Pre Charting time: 1 minutes; Post charting time, communication and other activities: 1 minutes; Total time 23 minutes  3:09 PM -           SUBJECTIVE     History of Present Illness    Steve Morgan, a 64-year-old male, presented with concerns about the cost of his current medication, Viibryd, which he is taking for his major depressive disorder, recurrent episode, moderate. He expressed that the medication is expensive and was exploring cheaper alternatives. However, he did not mention any specific symptoms or problems related to his depressive disorder during this consultation.    Steve also mentioned that he is planning to start exercising on an indoor bike, which he has at home. He expressed a positive attitude towards this, acknowledging that regular exercise can be as effective as an antidepressant. He did not mention any specific triggers for his depressive symptoms, but his decision to start exercising suggests that he is actively seeking ways to manage his condition.    In terms of medication, Steve is also taking Mirtazapine, in addition to Viibryd. He did not report any side effects from these medications. He confirmed that he is no longer taking Wellbutrin. Steve agreed to this plan, showing a willingness to adjust his treatment as needed.    Steve's main concern during this consultation was the cost of his medication. He  expressed surprise and concern about the cost of Viibryd, indicating that it might be a financial burden for him. Steve seemed receptive to these suggestions, indicating a proactive approach to managing his treatment costs.    Overall, Steve did not report any new symptoms or problems related to his depressive disorder during this consultation. His main concerns were the cost of his medication and his plans to start exercising. He did not report any changes in mood or behavior, and he did not mention any functional or emotional impairments. He did not discuss any significant events or traumas, and he did not express any specific thoughts, beliefs, feelings, aspirations, or fears. He did not mention any dreams or nightmares. His past treatments include Wellbutrin, which he is no longer taking. His current medications are Viibryd and Mirtazapine.             The following assessments were completed by patient for this visit:  PROMIS 10-Global Health (only subscores and total score):       5/21/2023     4:35 PM 8/23/2023     2:16 PM 11/16/2023    10:10 AM 12/1/2023     4:00 PM 3/12/2024    10:26 AM 4/10/2024     2:15 PM 9/17/2024     2:20 PM   PROMIS-10 Scores Only   Global Mental Health Score 10 11 9 8 7 10    10 12    12   Global Physical Health Score 13 15 12 12 11 13    13 14    14   PROMIS TOTAL - SUBSCORES 23 26 21 20 18 23    23 26    26             7/9/2024     9:38 AM 9/9/2024     1:08 PM 9/17/2024     2:17 PM   PHQ   PHQ-9 Total Score 16 8 8   Q9: Thoughts of better off dead/self-harm past 2 weeks Not at all Not at all Not at all          6/30/2023     8:21 AM 3/12/2024    10:24 AM 7/9/2024     9:39 AM   JOSE MANUEL-7 SCORE   Total Score 3 (minimal anxiety) 6 (mild anxiety) 7 (mild anxiety)   Total Score 3 6 7        OBJECTIVE     Vital Signs:   There were no vitals taken for this visit.    Labs:    Results    Kidney function tests show higher than expected levels for the dosage of Mirtazapine patient is on                Current Medications:  Current Outpatient Medications   Medication Sig Dispense Refill    acetaminophen (TYLENOL) 325 MG tablet Take 2 tablets (650 mg) by mouth every 4 hours as needed for other (For optimal non-opioid multimodal pain management to improve pain control.)      aspirin 81 MG EC tablet Take 1 tablet (81 mg) by mouth 2 times daily 60 tablet 0    atenolol (TENORMIN) 25 MG tablet TAKE 1 TABLET BY MOUTH EVERY DAY 90 tablet 1    Calcium Carb-Cholecalciferol (CALCIUM 600 + D PO) Take 1 tablet by mouth daily      Continuous Glucose Sensor (FREESTYLE GIULIANA 2 SENSOR) MISC Inject 1 each subcutaneously every 14 days. Use 1 sensor every 14 days. Use to read blood sugars per 's instructions. 6 each 3    famotidine (PEPCID) 40 MG tablet TAKE 1 TABLET BY MOUTH EVERY DAY 90 tablet 1    ferrous sulfate 325 (65 FE) MG tablet Take 1 tablet by mouth daily (with breakfast).      finasteride (PROSCAR) 5 MG tablet Take 5 mg by mouth at bedtime      gabapentin (NEURONTIN) 300 MG capsule Take 1 capsule (300 mg) by mouth 3 times daily 270 capsule 1    gabapentin (NEURONTIN) 300 MG capsule Take 1 capsule (300 mg) by mouth 3 times daily for 5 days 15 capsule 0    glipiZIDE (GLUCOTROL XL) 10 MG 24 hr tablet TAKE 1 TABLET (10 MG) BY MOUTH DAILY. 90 tablet 7    insulin glargine 100 UNIT/ML pen Inject 50 Units subcutaneously daily. 30 mL 2    insulin pen needle (B-D U/F) 31G X 5 MM miscellaneous USE 1 DAILY OR AS DIRECTED. 100 each 3    latanoprost (XALATAN) 0.005 % ophthalmic solution INSTILL 1 DROP INTO BOTH EYES IN THE EVENING      LEVOXYL 137 MCG tablet Take 1 tablet (137 mcg) by mouth daily. 90 tablet 3    losartan (COZAAR) 100 MG tablet Take 1 tablet (100 mg) by mouth daily 90 tablet 1    metFORMIN (GLUCOPHAGE) 1000 MG tablet Take 1 tablet (1,000 mg) by mouth 2 times daily (with meals). 180 tablet 0    mirtazapine (REMERON) 45 MG tablet Take 1 tablet (45 mg) by mouth at bedtime Take along with a 15 mg  tablet for a total of 60 mg nightly 30 tablet 2    MULTI-VITAMIN OR TABS Take 1 tablet by mouth daily 30 0    pantoprazole (PROTONIX) 40 MG EC tablet TAKE 1 TABLET (40 MG) BY MOUTH DAILY AS NEEDED FOR HEARTBURN 90 tablet 0    senna-docusate (SENOKOT-S/PERICOLACE) 8.6-50 MG tablet Take 1 tablet by mouth 2 times daily as needed for constipation 20 tablet 0    simvastatin (ZOCOR) 20 MG tablet Take 20 mg by mouth daily      vilazodone (VIIBRYD) 10 MG TABS tablet Take 1 tablet (10 mg) by mouth daily. 90 tablet 0     No current facility-administered medications for this visit.            ADDED HISTORY   Patient is planning to start exercising using an indoor bike at home. He is also considering joining the [employer] program. He is currently on Viibryd and Mirtazapine for his depression. Patient's phone number for receiving the Viibryd coupon is [phone].    Physical exam    Physical Exam    Physical exam  LUNGS: Patient reports feeling short of breath.  NEUROLOGIC: Patient reports balance issues.    Mental status exam  - Anxiety, depression, affect:  - Speech and language:  - Insight and judgment:  - Alert and orientation to person, place and situation:  - SI: None  - HI: None  - AH: None  - VH: None             MENTAL STATUS EXAMINATION:   Appearance: intact attention to grooming and hygiene  Attitude:  cooperative   Eye Contact:  good  Gait and Station: sitting  Psychomotor Behavior: Within normal limits  Oriented to:  Grossly person place and time  Attention Span and Concentration:  Grossly intact  Speech:  normal tone  Language: english  Mood: Fair  Affect: Mildly constricted  Associations:  no loose associations  Thought Process:  logical, linear and goal oriented  Thought Content:  No evidence of delusions or suicidal or homicidal ideation plan or intent  Memory: grossly intact  Fund of Knowledge: Good  Insight:  good  Judgment:  intact, adequate for safety  Impulse Control:  intact        DIAGNOSES:    Major Depressive Disorder, moderate, recurrent  SIADH/hyponatremia      ASSESSMENT:   Patient with history of depression, and considerable mood difficulties without prior combo of citalopram/bupropion affecting relationships negatively. Has comorbid diagnoses including SIADH/hyponatermia (possibly worse with selective serotonin reuptake inhibitor) historically, DM, obstructive sleep apnea, b12 deficiency.     Per literature review mirtazapine is less likely to cause hyponatremia, and for current eGFR no dosage necessary. Will pursue.    Today Steve Morgan reports no suicidal ideations. In addition, he has notable risk factors for self-harm, including age and comorbid medical conditions. However, risk is mitigated by commitment to family, Buddhism beliefs and absence of past attempts. Therefore, based on all available evidence including the factors cited above, he does not appear to be at imminent risk for self-harm, does not meet criteria for a 72-hr hold, and therefore remains appropriate for ongoing outpatient level of care.       PLAN:     Patient advised of consultative model. Patient will continue to be seen for ongoing consultation and stabilization.  Iterated consult model  Does not meet criteria for involuntary treatment or hospitalization  Add mirtazapine 5/25/23 7.5 mg po at bedtime x 3 nights then 15 mg po at bedtime slight benefit for anger=> 7/27/23 30 mg at bedtime efficacy partially, difficulty with irritability => 11/16/23 45 mg po at bedtime partial benefits => 2/27/24 60 mg at bedtime no effects at first, but later reports it is helping, worsened kidney function => 9/17/24 30 mg qhs-Risks, benefits and alternatives discussed.  Patient provides verbal consent to treatment. Follow-up if balance is better  Add vilazodone 6/18/24 10 mg daily efficacy without side effects-Risks, benefits and alternatives discussed.  Patient provides verbal consent to treatment.  Discussed hyponatremia  considerations. Follow-up cost - savings programs and coupon suggested  Consider buspirone, lower doses  DC'd citalopram (hyponatremia/SIADH), bupropion  Labs - reviewed; follow-up BMP in august  Referred for MTM referral, priority on guidance for psychotropic selection and risk of hyponatremia  Therapy with JULIANE Carlin; switching to long term therapy with ISAIAH Hicks   Anger management course - to start June or July  Motivational interviewing techniques done for exercise/biking - follow-up progress with goals  [facility] to send DBT resources to him      Assessment & Plan     Assessment  Patient is suffering from Major depressive disorder, recurrent episode, moderate. He is currently on Viibryd and Mirtazapine. However, due to balance issues and higher than expected levels of Mirtazapine in his system, it is recommended to reduce the dosage of Mirtazapine.    Plan  - Reduce Mirtazapine dosage to 45 mg for two weeks, then to 30 mg  - Monitor patient's response to reduced dosage of Mirtazapine  - Encourage regular exercise  - Check in with patient in 6 weeks  - Review side effects and lab instructions related to kidney function    Prescription  Mirtazapine 30 mg, 90 day supply    Appointments  Follow-up appointment in 6 weeks       The longitudinal plan of care for the diagnosis(es)/condition(s) as documented were addressed during this visit. Due to the added complexity in care, I will continue to support Caio in the subsequent management and with ongoing continuity of care.      Administrative Billing:   Time spent with patient was greater than 50% of time and/or significant time was spent in counseling and coordination of care regarding above diagnoses and treatment plan. Pre charting time and post charting time/documentation/coordination are done on date of service.      Signed:   Anant Pinto M.D.  [facility] Psychiatry Service    Disclaimer: This note consists of symbols derived from keyboarding, dictation and/or  voice recognition software. As a result, there may be errors in the script that have gone undetected. Please consider this when interpreting information found in this chart.    Consent was obtained from the patient to use an AI documentation tool in the creation of this note.     eoc

## 2024-09-17 NOTE — NURSING NOTE
Current patient location: [id number]  [facility] [address]    Is the patient currently in the state of [place]? YES    Visit mode:VIDEO    If the visit is dropped, the patient can be reconnected by: VIDEO VISIT: Send to e-mail at: [email or web address]    Will anyone else be joining the visit? NO  (If patient encounters technical issues they should call [phone])    How would you like to obtain your AVS? MyChart    Are changes needed to the allergy or medication list? No    Are refills needed on medications prescribed by this physician? YES    Rooming Documentation:  Questionnaire(s) completed      Reason for visit: RECHECK    Zackery GORDILLO

## 2024-09-23 ENCOUNTER — TELEPHONE (OUTPATIENT)
Dept: GASTROENTEROLOGY | Facility: CLINIC | Age: 64
End: 2024-09-23
Payer: MEDICARE

## 2024-09-23 NOTE — TELEPHONE ENCOUNTER
"Endoscopy Scheduling Screen    Have you had any respiratory illness or flu-like symptoms in the last 10 days?  No    What is your communication preference for Instructions and/or Bowel Prep?   MyChart    What insurance is in the chart?  Other:  MEDICARE    Ordering/Referring Provider: ROSE MARY SPANN   (If ordering provider performs procedure, schedule with ordering provider unless otherwise instructed. )    BMI: Estimated body mass index is 38.84 kg/m  as calculated from the following:    Height as of 9/9/24: 1.664 m (5' 5.5\").    Weight as of 9/9/24: 107.5 kg (237 lb).     Sedation Ordered  moderate sedation.   If patient BMI > 50 do not schedule in [facility].    If patient BMI > 45 do not schedule at [facility].    Are you taking methadone or Suboxone?  NO, No RN review required.    Have you been diagnosed and are being treated for severe PTSD or severe anxiety?  NO, No RN review required.    Are you taking any prescription medications for pain 3 or more times per week?   NO, No RN review required.    Do you have a history of malignant hyperthermia?  No    (Females) Are you currently pregnant?   No     Have you been diagnosed or told you have pulmonary hypertension?   No    Do you have an LVAD?  No    Have you been told you have moderate to severe sleep apnea?  Yes. Do you use a CPAP? Yes. (RN Review required for scheduling unless scheduling in Hospital.)     Have you been told you have COPD, asthma, or any other lung disease?  No    Do you have any heart conditions?  No     Have you ever had or are you waiting for an organ transplant?  No. Continue scheduling, no site restrictions.    Have you had a stroke or transient ischemic attack (TIA aka \"mini stroke\" in the last 6 months?   No    Have you been diagnosed with or been told you have cirrhosis of the liver?   No.    Are you currently on dialysis?   No    Do you need assistance transferring?   No    BMI: Estimated body mass index is 38.84 kg/m  as calculated from the " "following:    Height as of 9/9/24: 1.664 m (5' 5.5\").    Weight as of 9/9/24: 107.5 kg (237 lb).     Is patients BMI > 40 and scheduling location [facility]?  No    Do you take an injectable or oral medication for weight loss or diabetes (excluding insulin)?  No    Do you take the medication Naltrexone?  No    Do you take blood thinners?  No       Prep   Are you currently on dialysis or do you have chronic kidney disease?  Yes ([facility] Prep)    Do you have a diagnosis of diabetes?  Yes (Golytely Prep)    Do you have a diagnosis of cystic fibrosis (CF)?  No    On a regular basis do you go 3 -5 days between bowel movements?  No    BMI > 40?  No    Preferred Pharmacy:    [facility]/pharmacy #[id number] - [place], [place] - [address] [address] [facility]  [address] [facility] [address]  Phone: [phone] Fax: [phone]    Final Scheduling Details     Procedure scheduled  Colonoscopy    Surgeon:  MARIAELENA     Date of procedure:  12/2/24     Pre-OP / PAC:   No - Not required for this site.    Location  [facility] - Patient preference.    Sedation   Moderate Sedation - Per order.      Patient Reminders:   You will receive a call from a Nurse to review instructions and health history.  This assessment must be completed prior to your procedure.  Failure to complete the Nurse assessment may result in the procedure being cancelled.      On the day of your procedure, please designate an adult(s) who can drive you home stay with you for the next 24 hours. The medicines used in the exam will make you sleepy. You will not be able to drive.      You cannot take public transportation, ride share services, or non-medical taxi service without a responsible caregiver.  Medical transport services are allowed with the requirement that a responsible caregiver will receive you at your destination.  We require that drivers and caregivers are confirmed prior to your procedure.    "

## 2024-09-24 DIAGNOSIS — N40.1 BENIGN PROSTATIC HYPERPLASIA WITH LOWER URINARY TRACT SYMPTOMS: ICD-10-CM

## 2024-09-25 RX ORDER — FINASTERIDE 5 MG/1
1 TABLET, FILM COATED ORAL DAILY
Qty: 90 TABLET | Refills: 1 | Status: SHIPPED | OUTPATIENT
Start: 2024-09-25

## 2024-09-25 NOTE — TELEPHONE ENCOUNTER
Finasteride was taken off at hospital 5/18/24 by ortho.  Put on at hospital at discharge as historical.  AVS from hospital states to continue.  Routing to provider.  Daisy Bradley RN    5/17/2024 - 5/22/2024 (5 days)  [facility]         CONTINUE these medications which have NOT CHANGED     finasteride (PROSCAR) 5 MG tablet Take 5 mg by mouth at bedtime       Date/Time Action Taken User Additional Information   04/21/24 1247 Pend Interface, Eprescribing    04/22/24 1304 Sign Lisa Pierre PA-C Reorder from Order:[id number]   04/22/24 1304 Taking Flag Checked Lisa Pierre PA-C [id number]   05/17/24 2348 Order for Admission John Coleman MD To Order:[id number]   05/18/24 1130 Discontinue Javier Alonzo Reason:Med Rec(No AVS / No eCancel)     Outpatient Medication Detail     Disp Refills Start End KAMERON   finasteride (PROSCAR) 5 MG tablet (Discontinued) 90 tablet 1 4/22/2024 5/18/2024 No   Sig - Route: TAKE 1 TABLET BY MOUTH EVERY DAY - Oral   Sent to pharmacy as: Finasteride 5 MG Oral Tablet (PROSCAR)   Class: E-Prescribe   Reason for Discontinue: Med Rec(No AVS / No eCancel)   Order: [id number]   E-Prescribing Status: Receipt confirmed by pharmacy (4/22/2024  1:04 PM CDT)       Date/Time Action Taken User Additional Information   05/18/24 1131 Historical Med Noted Javier Alonzo    05/18/24 1314 Do Not Order for Admission Mirtha Vargas MD    05/22/24 1119 Resume at Discharge Maxwell Cantrell MD    07/09/24 0940 Taking Flag Checked Aric Gay MA    09/24/24 1631 Reorder Interface, Eprescribing To Order:[id number]     Outpatient Medication Detail     Disp Refills Start End KAMERON   finasteride (PROSCAR) 5 MG tablet -- --  -- No   Sig - Route: Take 5 mg by mouth at bedtime - Oral   Class: Historical

## 2024-09-25 NOTE — TELEPHONE ENCOUNTER
Clinic RN: Please contact patient because the medication is listed as historical or discontinued. Confirm patient is taking this medication. Document findings and route refill encounter to provider for approval or denial.  Lori Kaur RN, BSN  [facility]

## 2024-09-27 ENCOUNTER — LAB (OUTPATIENT)
Dept: LAB | Facility: CLINIC | Age: 64
End: 2024-09-27
Payer: MEDICARE

## 2024-09-27 DIAGNOSIS — N18.32 STAGE 3B CHRONIC KIDNEY DISEASE (CKD) (H): ICD-10-CM

## 2024-09-27 PROCEDURE — 80048 BASIC METABOLIC PNL TOTAL CA: CPT

## 2024-09-27 PROCEDURE — 36415 COLL VENOUS BLD VENIPUNCTURE: CPT

## 2024-09-28 LAB
ANION GAP SERPL CALCULATED.3IONS-SCNC: 13 MMOL/L (ref 7–15)
BUN SERPL-MCNC: 32.7 MG/DL (ref 8–23)
CALCIUM SERPL-MCNC: 9.5 MG/DL (ref 8.8–10.4)
CHLORIDE SERPL-SCNC: 95 MMOL/L (ref 98–107)
CREAT SERPL-MCNC: 1.77 MG/DL (ref 0.67–1.17)
EGFRCR SERPLBLD CKD-EPI 2021: 42 ML/MIN/1.73M2
GLUCOSE SERPL-MCNC: 286 MG/DL (ref 70–99)
HCO3 SERPL-SCNC: 22 MMOL/L (ref 22–29)
POTASSIUM SERPL-SCNC: 5.4 MMOL/L (ref 3.4–5.3)
SODIUM SERPL-SCNC: 130 MMOL/L (ref 135–145)

## 2024-10-02 RX ORDER — MIRTAZAPINE 45 MG/1
45 TABLET, FILM COATED ORAL AT BEDTIME
Qty: 90 TABLET | Refills: 0 | Status: SHIPPED | OUTPATIENT
Start: 2024-10-02 | End: 2024-10-29

## 2024-10-02 RX ORDER — MIRTAZAPINE 15 MG/1
15 TABLET, FILM COATED ORAL AT BEDTIME
Qty: 90 TABLET | Refills: 0 | Status: SHIPPED | OUTPATIENT
Start: 2024-10-02 | End: 2024-10-29

## 2024-10-03 RX ORDER — VILAZODONE HYDROCHLORIDE 20 MG/1
20 TABLET ORAL DAILY
Qty: 30 TABLET | Refills: 1 | Status: SHIPPED | OUTPATIENT
Start: 2024-10-03 | End: 2024-10-29

## 2024-10-09 ENCOUNTER — TELEPHONE (OUTPATIENT)
Dept: ENDOCRINOLOGY | Facility: CLINIC | Age: 64
End: 2024-10-09
Payer: MEDICARE

## 2024-10-09 NOTE — TELEPHONE ENCOUNTER
[facility] Call Center    Phone Message    May a detailed message be left on voicemail: yes     Reason for Call: Other: Pt requesting call  back. Pt called and stated he has some questions regarding his insulin

## 2024-10-12 DIAGNOSIS — K21.9 GASTROESOPHAGEAL REFLUX DISEASE WITHOUT ESOPHAGITIS: ICD-10-CM

## 2024-10-12 DIAGNOSIS — I10 ESSENTIAL HYPERTENSION WITH GOAL BLOOD PRESSURE LESS THAN 140/90: ICD-10-CM

## 2024-10-14 RX ORDER — FAMOTIDINE 40 MG/1
40 TABLET, FILM COATED ORAL DAILY
Qty: 90 TABLET | Refills: 2 | Status: SHIPPED | OUTPATIENT
Start: 2024-10-14

## 2024-10-14 RX ORDER — ATENOLOL 25 MG/1
25 TABLET ORAL DAILY
Qty: 90 TABLET | Refills: 2 | Status: SHIPPED | OUTPATIENT
Start: 2024-10-14

## 2024-10-21 ENCOUNTER — TELEPHONE (OUTPATIENT)
Dept: FAMILY MEDICINE | Facility: CLINIC | Age: 64
End: 2024-10-21

## 2024-10-21 ENCOUNTER — HOSPITAL ENCOUNTER (EMERGENCY)
Facility: CLINIC | Age: 64
Discharge: HOME | End: 2024-10-21
Attending: EMERGENCY MEDICINE | Admitting: EMERGENCY MEDICINE
Payer: MEDICARE

## 2024-10-21 ENCOUNTER — APPOINTMENT (OUTPATIENT)
Dept: CT IMAGING | Facility: CLINIC | Age: 64
End: 2024-10-21
Attending: EMERGENCY MEDICINE
Payer: MEDICARE

## 2024-10-21 ENCOUNTER — LAB (OUTPATIENT)
Dept: LAB | Facility: CLINIC | Age: 64
End: 2024-10-21
Payer: MEDICARE

## 2024-10-21 VITALS
HEART RATE: 68 BPM | TEMPERATURE: 98 F | OXYGEN SATURATION: 99 % | RESPIRATION RATE: 20 BRPM | SYSTOLIC BLOOD PRESSURE: 135 MMHG | DIASTOLIC BLOOD PRESSURE: 114 MMHG

## 2024-10-21 DIAGNOSIS — I10 PRIMARY HYPERTENSION: ICD-10-CM

## 2024-10-21 DIAGNOSIS — S09.90XA CLOSED HEAD INJURY, INITIAL ENCOUNTER: ICD-10-CM

## 2024-10-21 DIAGNOSIS — E11.40 TYPE 2 DIABETES MELLITUS WITH DIABETIC NEUROPATHY, WITH LONG-TERM CURRENT USE OF INSULIN (H): ICD-10-CM

## 2024-10-21 DIAGNOSIS — R73.9 HYPERGLYCEMIA: ICD-10-CM

## 2024-10-21 DIAGNOSIS — Z79.4 TYPE 2 DIABETES MELLITUS WITH DIABETIC NEUROPATHY, WITH LONG-TERM CURRENT USE OF INSULIN (H): ICD-10-CM

## 2024-10-21 LAB
ANION GAP SERPL CALCULATED.3IONS-SCNC: 14 MMOL/L (ref 7–15)
B-OH-BUTYR SERPL-SCNC: <0.18 MMOL/L
BASE EXCESS BLDV CALC-SCNC: -3.6 MMOL/L (ref -3–3)
BASOPHILS # BLD AUTO: 0 10E3/UL (ref 0–0.2)
BASOPHILS NFR BLD AUTO: 0 %
BUN SERPL-MCNC: 32.7 MG/DL (ref 8–23)
CALCIUM SERPL-MCNC: 9.3 MG/DL (ref 8.8–10.4)
CHLORIDE SERPL-SCNC: 96 MMOL/L (ref 98–107)
CREAT SERPL-MCNC: 1.95 MG/DL (ref 0.67–1.17)
CREAT SERPL-MCNC: 2.02 MG/DL (ref 0.67–1.17)
EGFRCR SERPLBLD CKD-EPI 2021: 36 ML/MIN/1.73M2
EGFRCR SERPLBLD CKD-EPI 2021: 37 ML/MIN/1.73M2
EOSINOPHIL # BLD AUTO: 0.1 10E3/UL (ref 0–0.7)
EOSINOPHIL NFR BLD AUTO: 1 %
ERYTHROCYTE [DISTWIDTH] IN BLOOD BY AUTOMATED COUNT: 13.2 % (ref 10–15)
EST. AVERAGE GLUCOSE BLD GHB EST-MCNC: 180 MG/DL
GLUCOSE BLD-MCNC: 409 MG/DL (ref 60–99)
GLUCOSE SERPL-MCNC: 311 MG/DL (ref 70–99)
HBA1C MFR BLD: 7.9 % (ref 0–5.6)
HCO3 BLDV-SCNC: 22 MMOL/L (ref 21–28)
HCO3 SERPL-SCNC: 19 MMOL/L (ref 22–29)
HCT VFR BLD AUTO: 29.7 % (ref 40–53)
HGB BLD-MCNC: 10.1 G/DL (ref 13.3–17.7)
IMM GRANULOCYTES # BLD: 0 10E3/UL
IMM GRANULOCYTES NFR BLD: 0 %
LYMPHOCYTES # BLD AUTO: 1.8 10E3/UL (ref 0.8–5.3)
LYMPHOCYTES NFR BLD AUTO: 23 %
MCH RBC QN AUTO: 29.5 PG (ref 26.5–33)
MCHC RBC AUTO-ENTMCNC: 34 G/DL (ref 31.5–36.5)
MCV RBC AUTO: 87 FL (ref 78–100)
MONOCYTES # BLD AUTO: 0.7 10E3/UL (ref 0–1.3)
MONOCYTES NFR BLD AUTO: 9 %
NEUTROPHILS # BLD AUTO: 5.1 10E3/UL (ref 1.6–8.3)
NEUTROPHILS NFR BLD AUTO: 66 %
NRBC # BLD AUTO: 0 10E3/UL
NRBC BLD AUTO-RTO: 0 /100
O2/TOTAL GAS SETTING VFR VENT: 0 %
OXYHGB MFR BLDV: 74 % (ref 70–75)
PCO2 BLDV: 41 MM HG (ref 40–50)
PH BLDV: 7.34 [PH] (ref 7.32–7.43)
PLATELET # BLD AUTO: 271 10E3/UL (ref 150–450)
PO2 BLDV: 41 MM HG (ref 25–47)
POTASSIUM SERPL-SCNC: 5.2 MMOL/L (ref 3.4–5.3)
RBC # BLD AUTO: 3.42 10E6/UL (ref 4.4–5.9)
SAO2 % BLDV: 75.1 % (ref 70–75)
SODIUM SERPL-SCNC: 129 MMOL/L (ref 135–145)
WBC # BLD AUTO: 7.6 10E3/UL (ref 4–11)

## 2024-10-21 PROCEDURE — G1010 CDSM STANSON: HCPCS

## 2024-10-21 PROCEDURE — 99284 EMERGENCY DEPT VISIT MOD MDM: CPT | Mod: 25

## 2024-10-21 PROCEDURE — 36415 COLL VENOUS BLD VENIPUNCTURE: CPT

## 2024-10-21 PROCEDURE — 85004 AUTOMATED DIFF WBC COUNT: CPT | Performed by: EMERGENCY MEDICINE

## 2024-10-21 PROCEDURE — 83036 HEMOGLOBIN GLYCOSYLATED A1C: CPT

## 2024-10-21 PROCEDURE — 36415 COLL VENOUS BLD VENIPUNCTURE: CPT | Performed by: EMERGENCY MEDICINE

## 2024-10-21 PROCEDURE — 82010 KETONE BODYS QUAN: CPT | Performed by: EMERGENCY MEDICINE

## 2024-10-21 PROCEDURE — 82805 BLOOD GASES W/O2 SATURATION: CPT | Performed by: EMERGENCY MEDICINE

## 2024-10-21 PROCEDURE — 80048 BASIC METABOLIC PNL TOTAL CA: CPT | Performed by: EMERGENCY MEDICINE

## 2024-10-21 PROCEDURE — 82947 ASSAY GLUCOSE BLOOD QUANT: CPT | Performed by: EMERGENCY MEDICINE

## 2024-10-21 PROCEDURE — 250N000013 HC RX MED GY IP 250 OP 250 PS 637: Performed by: EMERGENCY MEDICINE

## 2024-10-21 PROCEDURE — 80048 BASIC METABOLIC PNL TOTAL CA: CPT

## 2024-10-21 PROCEDURE — 82565 ASSAY OF CREATININE: CPT

## 2024-10-21 RX ORDER — ACETAMINOPHEN 500 MG
1000 TABLET ORAL ONCE
Status: COMPLETED | OUTPATIENT
Start: 2024-10-21 | End: 2024-10-21

## 2024-10-21 RX ADMIN — ACETAMINOPHEN 1000 MG: 500 TABLET ORAL at 14:59

## 2024-10-21 ASSESSMENT — ACTIVITIES OF DAILY LIVING (ADL)
ADLS_ACUITY_SCORE: 38
ADLS_ACUITY_SCORE: 38

## 2024-10-21 ASSESSMENT — COLUMBIA-SUICIDE SEVERITY RATING SCALE - C-SSRS
2. HAVE YOU ACTUALLY HAD ANY THOUGHTS OF KILLING YOURSELF IN THE PAST MONTH?: NO
1. IN THE PAST MONTH, HAVE YOU WISHED YOU WERE DEAD OR WISHED YOU COULD GO TO SLEEP AND NOT WAKE UP?: NO
6. HAVE YOU EVER DONE ANYTHING, STARTED TO DO ANYTHING, OR PREPARED TO DO ANYTHING TO END YOUR LIFE?: NO

## 2024-10-21 NOTE — DISCHARGE INSTRUCTIONS
Please make an appointment to follow up with your primary care provider in 5-7 days even if entirely better.    Discharge Instructions  Head Injury    You have been seen today for a head injury. Your evaluation included a history and physical examination. You may have had a CT (CAT) scan performed, though most head injuries do not require a scan. Based on this evaluation, your provider today does not feel that your head injury is serious.    Generally, every Emergency Department visit should have a follow-up clinic visit with either a primary or a specialty clinic/provider. Please follow-up as instructed by your emergency provider today.  Return to the Emergency Department if:  You are confused or you are not acting right.  Your headache gets worse or you start to have a really bad headache even with your recommended treatment plan.  You vomit (throw up) more than once.  You have a seizure.  You have trouble walking.  You have weakness or paralysis (cannot move) in an arm or a leg.  You have blood or fluid coming from your ears or nose.  You have new symptoms or anything that worries you.    Sleeping:  It is okay for you to sleep, but someone should wake you up if instructed by your provider, and someone should check on you at your usual time to wake up.     Activity:  Do not drive for at least 24 hours.  Do not drive if you have dizzy spells or trouble concentrating, or remembering things.  Do not return to any contact sports until cleared by your regular provider.     MORE INFORMATION:    Concussion:  A concussion is a minor head injury that may cause temporary problems with the way the brain works. Although concussions are important, they are generally not an emergency or a reason that a person needs to be hospitalized. Some concussion symptoms include confusion, amnesia (forgetful), nausea (sick to your stomach) and vomiting (throwing up), dizziness, fatigue, memory or concentration problems, irritability and  sleep problems. For most people, concussions are mild and temporary but some will have more severe and persistent symptoms that require on-going care and treatment.  CT Scans: Your evaluation today may have included a CT scan (CAT scan) to look for things like bleeding or a skull fracture (broken bone).  CT scans involve radiation and too many CT scans can cause serious health problems like cancer, especially in children.  Because of this, your provider may not have ordered a CT scan today if they think you are at low risk for a serious or life threatening problem.    Blood Thinners. If you take blood thinners, there is a very small risk of delayed bleeding after your head injury. In the days/weeks to come, watch for the symptoms described above particularly headaches, confusion, problems walking, and weakness in an arm or leg.    If you were given a prescription for medicine here today, be sure to read all of the information (including the package insert) that comes with your prescription.  This will include important information about the medicine, its side effects, and any warnings that you need to know about.  The pharmacist who fills the prescription can provide more information and answer questions you may have about the medicine.  If you have questions or concerns that the pharmacist cannot address, please call or return to the Emergency Department.     Remember that you can always come back to the Emergency Department if you are not able to see your regular provider in the amount of time listed above, if you get any new symptoms, or if there is anything that worries you.  Discharge Instructions  Hyperglycemia, High Blood Sugar    Today we found your blood sugar (glucose) was high. This may mean that you have developed diabetes, or if you already know that you have diabetes, it may mean that your diabetes is not as well controlled as it should be. Sometimes blood sugar can be high temporarily and it is not  diabetes. Signs of elevated blood sugar include increased thirst, frequent urination (peeing), blurred vision, fatigue, unexplained weight loss, poor wound healing, and frequent infections.    We sometimes give medicine in the Emergency Department to lower the blood sugar. We may also prescribe medicine for you to use at home, or increase the medicine that you already take. While we do not like to see your blood sugar high, it is much more dangerous to let your blood sugar get too low, so it is reasonable to take time to bring it down, or to wait and watch to see if it comes down on its own.    Generally, every Emergency Department visit should have a follow-up clinic visit with either a primary or a specialty clinic/provider. Please follow-up as instructed by your emergency provider today.     Return to the Emergency Department if you develop:  Vomiting (throwing up).  Confusion, disorientation, or being unable to wake up.  Severe weakness or illness.  Abdominal (belly) pain.    What can I do to help myself?  Check your blood sugar as instructed by your provider.  Take medications prescribed by your provider.  Follow a diabetic diet (low fat, low concentrated sweets, high fiber).  Exercise regularly.  Moderate or eliminate alcohol use.  Stop smoking.    Diabetes: Diabetes mellitus is a disease in which the body cannot regulate the amount of sugar (glucose) in the blood. Insulin allows glucose to move out of the blood into cells throughout the body where it is used for fuel. People with diabetes do not produce enough insulin (type 1 diabetes), or cannot use insulin properly (type 2 diabetes), or both. This starves the cells that need the glucose for fuel, and also harms certain organs and tissues exposed to the high glucose levels.  Over a long period of time, uncontrolled diabetes can lead to heart and blood vessel disease, blindness, kidney failure, foot ulcers and many other problems.          About 17 million  Americans (6.2% of adults) have diabetes. We think that about one third of adults with diabetes do not know they have diabetes.  The incidence of diabetes is increasing rapidly. This increase is due to many factors, but the most significant are our increasing weight and decreased activity levels.     Diabetes can be a very serious and life-threatening illness if not treated.  If you were given a prescription for medicine here today, be sure to read all of the information (including the package insert) that comes with your prescription.  This will include important information about the medicine, its side effects, and any warnings that you need to know about.  The pharmacist who fills the prescription can provide more information and answer questions you may have about the medicine.  If you have questions or concerns that the pharmacist cannot address, please call or return to the Emergency Department.   Remember that you can always come back to the Emergency Department if you are not able to see your regular provider in the amount of time listed above, if you get any new symptoms, or if there is anything that worries you.

## 2024-10-21 NOTE — TELEPHONE ENCOUNTER
Patient had lab appt today     Upon walking down the [place] using cane from lab appointment, patient fell in the hallway     Honey/Kavitha BAJWA's attended to patient, zachary trinh to alert other care team that help was needed     RN's arrival to patient approximately 1:40   Patient was laying on his back on the floor   Vitals checked laying 190/98, 81, 99%   Vitals recheck laying 171/95, 74, 98%    Vitals recheck sitting 180/94, 71, 98%   Patient states he tripped over his feet while walking, history of neuropathy and does not feel his legs/feet   Denies chest pain, shortness of breath, nausea/vomiting   Upon further RN assessment patient notes a fall last night while walking on stairs and hit the back of his head. Patient now notes neck pain and headache back of head, takes ASA daily     RN huddle with Herminio GILL CNP    Advise patient to go to ER due to head injury last night - patient declined   RN advised to schedule an appt in clinic tomorrow if declines ER   Patient wanted to walk out to [facility] to schedule the appointment he was asymptomatic, upon standing patient fell again in the hallway by lab     Rapid response called   Dr. Ruiz assessed patient briefly in hallway   Glucose 409   Patient denies vision changes   Wife Sri called by MA   Vitals recheck 182/88, 73, 98% laying, 181/98, 70, 98%   EMS called, they arrived at 2:56, patient placed in neck brace and  transported to [facility] ER for evaluation     [facility] completed # [id number]     Marcela Blackburn, Registered Nurse  [facility]

## 2024-10-21 NOTE — ED TRIAGE NOTES
Arrives via EMS after a fall at the clinic today. Patient reports he was at the clinic to have labs drawn and he lost his footing and fell. He believes this is due to neuropathy in his feet. He reports he also fell last night and hit his head at home because he lost his balance. He reported neck pain to EMS and they applied a c-collar, however on arrival to ED he reports that this neck pain started a week ago prior to the falls and is not worse after the falls. Denies other injuries, alert and oriented, ABCs intact.     Triage Assessment (Adult)       Row Name 10/21/24 1419          Triage Assessment    Airway WDL WDL        Respiratory WDL    Respiratory WDL WDL        Skin Circulation/Temperature WDL    Skin Circulation/Temperature WDL WDL        Cardiac WDL    Cardiac WDL WDL        Peripheral/Neurovascular WDL    Peripheral Neurovascular WDL WDL        Cognitive/Neuro/Behavioral WDL    Cognitive/Neuro/Behavioral WDL WDL

## 2024-10-21 NOTE — ED PROVIDER NOTES
Emergency Department Note      History of Present Illness     Chief Complaint   Fall      HPI     Steve Morgan is a 64 year old male presents with fall and headache.  Patient reports a baseline history of neuropathy which often leads to falls.  Last night he was at home and went to grab the railing but missed the railing.  He then lost his balance and fell backwards striking his head on the door.  Did not lose consciousness.  He was not having any discomfort until today when he developed the gradual onset of mild dull headache.  Has not taken medications prior to arrival.  He denies any new numbness, weakness, nausea or vomiting.  Today he was going for a lab draw in which he felt the tip of his foot to catch the ground again secondary neuropathy when she had a fall.  Did not strike his head, lose consciousness or suffer any injury from this today.  After the second fall and reports of headache, he was referred to ED.    Patient also reported the onset of neck pain 1 week prior while at the ENT clinic.  This was atraumatic.  It has been intermittently right and left-sided.  There is increased pain with range of motion particularly with rotation of the head.  He denies that his neck pain has changed in any fashion since the fall.    Independent Historian   None    Review of External Notes   I reviewed clinic RN note from earlier today.  Patient was at a lab appointment.  He was walking on the hallway using a cane when he fell in the hallway.  Vital signs revealed hypertension but unremarkable heart rate.Here and he reported to nursing staff that he tripped over his feet due to a history of neuropathy and does not feel his feet/legs.     Past Medical  History     Medical History and Problem List   Past Medical History:   Diagnosis Date    Cellulitis and abscess of trunk 06/27/2017    Depression     Depressive disorder     Hyperlipidemia LDL goal <100 10/31/2010    Hypertension goal BP (blood pressure) < 140/90  03/17/2011    Hypothyroidism 01/12/2010    Morbid obesity due to excess calories (H) 01/12/2010    Neuropathy in diabetes (H) 01/12/2010    Obesity 01/12/2010    Sleep apnea 01/12/2010    Type 2 diabetes, HbA1C goal < 8% (H) 03/08/2011       Medications   acetaminophen (TYLENOL) 325 MG tablet  aspirin 81 MG EC tablet  atenolol (TENORMIN) 25 MG tablet  Calcium Carb-Cholecalciferol (CALCIUM 600 + D PO)  Continuous Glucose Sensor (FREESTYLE GIULIANA 2 SENSOR) MISC  famotidine (PEPCID) 40 MG tablet  ferrous sulfate 325 (65 FE) MG tablet  finasteride (PROSCAR) 5 MG tablet  gabapentin (NEURONTIN) 300 MG capsule  gabapentin (NEURONTIN) 300 MG capsule  glipiZIDE (GLUCOTROL XL) 10 MG 24 hr tablet  insulin glargine 100 UNIT/ML pen  insulin pen needle (B-D U/F) 31G X 5 MM miscellaneous  latanoprost (XALATAN) 0.005 % ophthalmic solution  LEVOXYL 137 MCG tablet  losartan (COZAAR) 100 MG tablet  metFORMIN (GLUCOPHAGE) 1000 MG tablet  mirtazapine (REMERON) 15 MG tablet  mirtazapine (REMERON) 45 MG tablet  MULTI-VITAMIN OR TABS  pantoprazole (PROTONIX) 40 MG EC tablet  senna-docusate (SENOKOT-S/PERICOLACE) 8.6-50 MG tablet  simvastatin (ZOCOR) 20 MG tablet  vilazodone (VIIBRYD) 20 MG TABS tablet        Surgical History   Past Surgical History:   Procedure Laterality Date    BIOPSY      BURSECTOMY KNEE Right 05/20/2024    Procedure: Excisional prepatellar bursectomy, right knee;  Surgeon: Justin Bo MD;  Location:  OR    COLONOSCOPY      COSMETIC SURGERY      EYE SURGERY      GENITOURINARY SURGERY      surg for undescended testicle    IRRIGATION AND DEBRIDEMENT HAND, COMBINED Right 12/23/2022    Procedure: Right long finger irrigation and debridement with partial amputation of the right long finger. ;  Surgeon: Colby English MD;  Location:  OR    REPAIR HAMMER TOE BILATERAL  05/16/2013    Procedure: REPAIR HAMMER TOE BILATERAL;  Flexor Tenotomy Toes 2,3,4,5 Bilateral Feet;  Surgeon: Saad Bangura DPM;   Location: [facility] OR       Physical Exam     Patient Vitals for the past 24 hrs:   BP Temp Temp src Pulse Resp SpO2   10/21/24 1547 (!) 135/114 -- -- -- -- 99 %   10/21/24 1447 (!) 167/89 -- -- 68 -- --   10/21/24 1434 (!) 159/75 -- -- -- -- --   10/21/24 1418 (!) 175/85 98  F (36.7  C) Oral 68 20 99 %   10/21/24 1417 (!) 175/85 -- -- 71 -- 99 %     Physical Exam      HEENT:   No scalp hematoma or defect to the bony calvarium.      Plaza's and Racoon's sign negative.      Oropharynx is moist, without lesions or trismus.  EYES:   Conjunctiva normal, PERRL    EOMs intact  NECK:   C-spine non-tender with full ROM.      No bony step-off to cervical spine.   CV:    Regular rate and rhythm.     No murmurs, rubs or gallops.    PULM:  Clear to auscultation bilateral.      No respiratory distress.      No subcutaneous emphysema or crepitus.  ABD:   Soft, non-tender, non-distended.      No rebound or guarding.  MSK:    No focal bony tenderness to the extremities.    LYMPH:  No cervical lymphadenopathy.  NEURO:  Alert and oriented x 3. GCS 15.      CN II-XII intact, speech is clear with no aphasia.      Strength is 5/5 in all 4 extremities.        Normal muscular tone, no tremor.  SKIN:   Warm, dry and intact.    PSYCH:   Mood is good and affect is appropriate.    Diagnostics     Lab Results   Labs Ordered and Resulted from Time of ED Arrival to Time of ED Departure   BASIC METABOLIC PANEL - Abnormal       Result Value    Sodium 129 (*)     Potassium 5.2      Chloride 96 (*)     Carbon Dioxide (CO2) 19 (*)     Anion Gap 14      Urea Nitrogen 32.7 (*)     Creatinine 1.95 (*)     GFR Estimate 37 (*)     Calcium 9.3      Glucose 311 (*)    BLOOD GAS VENOUS - Abnormal    pH Venous 7.34      pCO2 Venous 41      pO2 Venous 41      Bicarbonate Venous 22      Base Excess/Deficit Venous -3.6 (*)     FIO2 0      Oxyhemoglobin Venous 74      O2 Sat, Venous 75.1 (*)    CBC WITH PLATELETS AND DIFFERENTIAL - Abnormal    WBC Count 7.6      RBC  Count 3.42 (*)     Hemoglobin 10.1 (*)     Hematocrit 29.7 (*)     MCV 87      MCH 29.5      MCHC 34.0      RDW 13.2      Platelet Count 271      % Neutrophils 66      % Lymphocytes 23      % Monocytes 9      % Eosinophils 1      % Basophils 0      % Immature Granulocytes 0      NRBCs per 100 WBC 0      Absolute Neutrophils 5.1      Absolute Lymphocytes 1.8      Absolute Monocytes 0.7      Absolute Eosinophils 0.1      Absolute Basophils 0.0      Absolute Immature Granulocytes 0.0      Absolute NRBCs 0.0     KETONE BETA-HYDROXYBUTYRATE QUANTITATIVE, RAPID - Normal    Ketone (Beta-Hydroxybutyrate) Quantitative <0.18         Imaging   Head CT w/o contrast   Final Result   IMPRESSION:  Diffuse cerebral volume loss and cerebral white matter   changes consistent with chronic small vessel ischemic disease. No   evidence for acute intracranial pathology.         Radiation dose for this scan was reduced using automated exposure   control, adjustment of the mA and/or kV according to patient size, or   iterative reconstruction technique.      FLOYD CHRISTIANSON MD            SYSTEM ID:  [id number]          Independent Interpretation   None    ED Course      Medications Administered   Medications   acetaminophen (TYLENOL) tablet 1,000 mg (1,000 mg Oral $Given 10/21/24 8097)       Procedures   Procedures     Discussion of Management   None    ED Course        Additional Documentation  None    Medical Decision Making / Diagnosis       [facility]     Steve Morgan is a 64 year old male presents with mechanical fall x 2 resulting in closed head injury and mild headache.  CT scan of the head is unremarkable.  He is not on any oral anticoagulants to draw concern for delayed intra-cerebral hemorrhage.  He did report neck pain but this was present 1 week prior to the fall.  He has no cervical spine tenderness and has full range of motion thus no concern for cervical spine injury.  He likely has preceding cervical strain.    Patient was noted to  be hyperglycemic thus basic laboratory studies were undertaken.  Despite hyperglycemia, there is no DKA or additional acute pathology noted.  Patient recommended to follow-up with primary care physician for ongoing management for his diabetes and hypertension.  Tylenol as needed for pain.    Disposition   The patient was discharged.     Diagnosis     ICD-10-CM    1. Closed head injury, initial encounter  S09.90XA       2. Hyperglycemia  R73.9       3. Primary hypertension  I10            Discharge Medications   New Prescriptions    No medications on file         MD Joaquin Olvera Jeremiah R, MD  10/21/24 0802

## 2024-10-29 ENCOUNTER — VIRTUAL VISIT (OUTPATIENT)
Dept: BEHAVIORAL HEALTH | Facility: CLINIC | Age: 64
End: 2024-10-29
Payer: MEDICARE

## 2024-10-29 ENCOUNTER — VIRTUAL VISIT (OUTPATIENT)
Dept: PSYCHIATRY | Facility: CLINIC | Age: 64
End: 2024-10-29
Payer: MEDICARE

## 2024-10-29 DIAGNOSIS — F33.1 MAJOR DEPRESSIVE DISORDER, RECURRENT EPISODE, MODERATE (H): ICD-10-CM

## 2024-10-29 DIAGNOSIS — F33.0 MILD EPISODE OF RECURRENT MAJOR DEPRESSIVE DISORDER (H): Primary | ICD-10-CM

## 2024-10-29 PROCEDURE — 90832 PSYTX W PT 30 MINUTES: CPT | Mod: 95 | Performed by: COUNSELOR

## 2024-10-29 PROCEDURE — 99214 OFFICE O/P EST MOD 30 MIN: CPT | Mod: 95 | Performed by: PSYCHIATRY & NEUROLOGY

## 2024-10-29 RX ORDER — MIRTAZAPINE 45 MG/1
45 TABLET, FILM COATED ORAL AT BEDTIME
Qty: 90 TABLET | Refills: 0 | Status: SHIPPED | OUTPATIENT
Start: 2024-10-29

## 2024-10-29 RX ORDER — VILAZODONE HYDROCHLORIDE 20 MG/1
20 TABLET ORAL DAILY
Qty: 30 TABLET | Refills: 1 | Status: SHIPPED | OUTPATIENT
Start: 2024-10-29

## 2024-10-29 ASSESSMENT — PATIENT HEALTH QUESTIONNAIRE - PHQ9
10. IF YOU CHECKED OFF ANY PROBLEMS, HOW DIFFICULT HAVE THESE PROBLEMS MADE IT FOR YOU TO DO YOUR WORK, TAKE CARE OF THINGS AT HOME, OR GET ALONG WITH OTHER PEOPLE: SOMEWHAT DIFFICULT
SUM OF ALL RESPONSES TO PHQ QUESTIONS 1-9: 7
SUM OF ALL RESPONSES TO PHQ QUESTIONS 1-9: 7

## 2024-10-29 NOTE — PROGRESS NOTES
[facility]  10/29/2024      Behavioral Health Clinician Progress Note     Patient Name: Steve Morgan           Service Type:  Individual      [facility] (patient reached)     Session Start Time: 235pm  Session End Time: 301pm      Session Length: 16 - 37      Attendees: Patient     Service Modality:  Video Visit:      Provider verified identity through the following two step process.  Patient provided:  Patient photo and Patient was verified at admission/transfer    Telemedicine Visit: The patient's condition can be safely assessed and treated via synchronous audio and visual telemedicine encounter.      Reason for Telemedicine Visit: Services only offered telehealth    Originating Site (Patient Location): Patient's home    Distant Site (Provider Location): Provider Remote Setting- Home Office    Consent:  The patient/guardian has verbally consented to: the potential risks and benefits of telemedicine (video visit) versus in person care; bill my insurance or make self-payment for services provided; and responsibility for payment of non-covered services.     Patient would like the video invitation sent by:  My Chart    Mode of Communication:  Video Conference via [facility]    Distant Location (Provider):  Off-site    As the provider I attest to compliance with applicable laws and regulations related to telemedicine.    Visit Activities (Refresh list every visit): [facility] Only     Diagnostic Assessment Date: 1/31/24 Margaret Asif   Treatment Plan Review Date: 12/16/24  See Flowsheets for today's PHQ-9 and JOSE MANUEL-7 results  Previous PHQ-9:       9/9/2024     1:08 PM 9/17/2024     2:17 PM 10/29/2024     2:06 PM   PHQ-9 SCORE   PHQ-9 Total Score Jarrod  8 (Mild depression) 7 (Mild depression)   PHQ-9 Total Score 8 8 7        Patient-reported     Previous JOSE MANUEL-7:       6/30/2023     8:21 AM 3/12/2024    10:24 AM 7/9/2024     9:39 AM   JOSE MANUEL-7 SCORE    Total Score 3 (minimal anxiety) 6 (mild anxiety) 7 (mild anxiety)   Total Score 3 6 7   See JOSE MANUEL 2 below for current scores.     SHANNON LEVEL:      1/15/2020     8:18 PM 12/16/2022     2:26 PM   SHANNON Score (Last Two)   SHANNON Raw Score 36 34   Activation Score 75.5 68.9   SHANNON Level 4 3       DATA  Extended Session (60+ minutes): No  Interactive Complexity: No  Crisis: No  [facility] Patient: No    Treatment Objective(s) Addressed in This Session:  Target Behavior(s):  improve depressive sx    Depressed Mood: Decrease frequency and intensity of feeling down, depressed, hopeless  Improve concentration, focus, and mindfulness in daily activities     Current Stressors / Issues:  Questions/Thoughts for Dr. Pinto:    ADLs: sleep- getting 8-10 hrs, appetite- good, his blood sugar will go low in the morning and he will eat and it will go high   Current Symptoms: Pt states that he has been doing DBT since May and they are in a transition period and are talking more about mindfulness. Pt is getting ready for a 2 week vacation. He is practicing mindfulness as much as he can. Pt still has time where he will be irritable. Caio says that the increase in vilazodone and going back to higher level of the mirtazapine have been helpful for him. He denies feeling as depressed or down.     Therapist:seeing DBT therapist and has been able to be open with her   [facility] recommendations: continue to try to practice new skills learned in DBT and continue to meet with DBT therapist     Progress towards goals: Caio says that he is practicing mindfulness skills in group and as much as he can outside of group       Progress on Treatment Objective(s) / Homework:  Satisfactory progress - ACTION (Actively working towards change); Intervened by reinforcing change plan / affirming steps taken     DBT:Caio says that he is practicing mindfulness skills in group and as much as he can outside of group.  [facility] encourages him to continue to practice mindfulness and be  neutral about his thoughts and emotions.     Motivational Interviewing    MI Intervention: Co-Developed Goal: manage mood and irritability, Expressed Empathy/Understanding, Supported Autonomy, Collaboration, Evocation, Permission to raise concern or advise, Open-ended questions, Reflections: simple and complex, Change talk (evoked), and Reframe     Change Talk Expressed by the Patient: Committment to change Activation Taking steps    Provider Response to Change Talk: E - Evoked more info from patient about behavior change, A - Affirmed patient's thoughts, decisions, or attempts at behavior change, R - Reflected patient's change talk, and S - Summarized patient's change talk statements    Also provided psychoeducation about behavioral health condition, symptoms, and treatment options    Assessments completed prior to visit:  The following assessments were completed by patient for this visit:  PHQ9:       4/10/2024     2:12 PM 5/1/2024    10:17 AM 6/18/2024     8:48 AM 7/9/2024     9:38 AM 9/9/2024     1:08 PM 9/17/2024     2:17 PM 10/29/2024     2:06 PM   PHQ-9 SCORE   PHQ-9 Total Score MyChart 17 (Moderately severe depression) 9 (Mild depression) 11 (Moderate depression) 16 (Moderately severe depression)  8 (Mild depression) 7 (Mild depression)   PHQ-9 Total Score 17 9 11 16 8 8 7        Patient-reported     PROMIS 10-Global Health (only subscores and total score):       5/21/2023     4:35 PM 8/23/2023     2:16 PM 11/16/2023    10:10 AM 12/1/2023     4:00 PM 3/12/2024    10:26 AM 4/10/2024     2:15 PM 9/17/2024     2:20 PM   PROMIS-10 Scores Only   Global Mental Health Score 10 11 9  7 10    10 12    12   Global Physical Health Score 13 15 12  11 13    13 14    14   PROMIS TOTAL - SUBSCORES 23 26 21  18 23    23 26    26       Information is confidential and restricted. Go to Review Flowsheets to unlock data.    Multiple values from one day are sorted in reverse-chronological order     GAD2:       5/21/2023     4:33  PM 8/23/2023     2:14 PM 11/16/2023    10:08 AM 2/26/2024     5:22 PM 5/31/2024    11:23 AM 6/18/2024     8:48 AM 9/17/2024     2:18 PM   JOSE MANUEL-2   Feeling nervous, anxious, or on edge 0  1  0  1  1  1  1    Not being able to stop or control worrying 0  0  0  0  1  1  1    JOSE MANUEL-2 Total Score 0 1 0 1    1 2 2    2 2    2       Patient-reported    Multiple values from one day are sorted in reverse-chronological order       Care Plan review completed: Yes    Medication Review:  No changes to current psychiatric medication(s)    Medication Compliance:  Yes    Changes in Health Issues:   None reported    Chemical Use Review:   Substance Use: Chemical use reviewed, no active concerns identified      Tobacco Use: No current tobacco use.      Assessment: Current Emotional / Mental Status (status of significant symptoms):  Risk status (Self / Other harm or suicidal ideation)  Patient denies a history of suicidal ideation, suicide attempts, self-injurious behavior, homicidal ideation, homicidal behavior, and and other safety concerns   Patient denies current fears or concerns for personal safety.  Patient denies current or recent suicidal ideation or behaviors.  Patient denies current or recent homicidal ideation or behaviors.  Patient denies current or recent self injurious behavior or ideation.  Patient denies other safety concerns.  A safety and risk management plan has not been developed at this time, however patient was encouraged to call [place] [phone] should there be a change in any of these risk factors.    Appearance:   Appropriate   Eye Contact:   Good   Psychomotor Behavior: Normal   Attitude:   Cooperative  Interested Friendly  Orientation:   All  Speech   Rate / Production: Normal    Volume:  Normal   Mood:    Irritable  Normal  Affect:    Appropriate   Thought Content:  Clear   Thought Form:  Coherent  Logical   Insight:    Good     Diagnoses:  1. Mild episode of recurrent major depressive disorder (H)           Collateral Reports Completed:  Communicated with: Anant Pinto M.D.     Plan: (Homework, other):  Patient was given information about behavioral services and encouraged to schedule a follow up appointment with the clinic [facility] in 1 month.  He was also given information about mental health symptoms and treatment options .  CD Recommendations: No indications of CD issues.     Catia An, LICSW    ______________________________________________________________________    Integrated Primary Care Behavioral Health Treatment Plan    Patient's Name: Steve Morgan  YOB: 1960    Date of Creation: 1/11/2024  Date Treatment Plan Last Reviewed/Revised: 4/10/24; 9/17/2024    DSM5 Diagnoses:   1. Mild episode of recurrent major depressive disorder (H24)      Psychosocial / Contextual Factors: has wife, son, is reitired  PROMIS (reviewed every 90 days):   PROMIS 10-Global Health (only subscores and total score):       5/21/2023     4:35 PM 8/23/2023     2:16 PM 11/16/2023    10:10 AM 12/1/2023     4:00 PM 3/12/2024    10:26 AM 4/10/2024     2:15 PM 9/17/2024     2:20 PM   PROMIS-10 Scores Only   Global Mental Health Score 10 11 9  7 10    10 12    12   Global Physical Health Score 13 15 12  11 13    13 14    14   PROMIS TOTAL - SUBSCORES 23 26 21  18 23    23 26    26       Information is confidential and restricted. Go to Review Flowsheets to unlock data.    Multiple values from one day are sorted in reverse-chronological order       Referral / Collaboration:  Referral to another professional/service is not indicated at this time.    Anticipated number of session for this episode of care: 5-6  Anticipation frequency of session: Monthly  Anticipated Duration of each session: 16-37 minutes  Treatment plan will be reviewed in 90 days or when goals have been changed.       MeasurableTreatment Goal(s) related to diagnosis / functional impairment(s)  Goal 1: Patient will report improved irritability and  "depression     Pt will know he's met goal when \"I am learning mindfulness and other DBT skills. Irritability hasn't been a huge problem.\"     Objective #A (Patient Action)                          Patient will work to find 1-2 ways to reduce anger/irritability.   Status: Continue - Date: 4/10/2024; 9/17/2024    Intervention(s)  Therapist provide support through CBT and DBT.      Patient has reviewed and agreed to the above plan.      Catia An, [facility]   9/17/2024    "

## 2024-10-29 NOTE — NURSING NOTE
Is the patient currently in the state of [place]? YES    Current patient location: [address]  [facility] [id number]    Visit mode:VIDEO    If the visit is dropped, the patient can be reconnected by: VIDEO VISIT: Text to cell phone:   Telephone Information:   Mobile [phone]       Will anyone else be joining the visit? No  (If patient encounters technical issues they should call [phone])    Are changes needed to the allergy or medication list? No    Are refills needed on medications prescribed by this physician? Discuss with Provider    Rooming Documentation: Patient will complete qnrs in [facility].    Reason for visit: DUY Ley

## 2024-10-29 NOTE — PROGRESS NOTES
Virtual Visit Details    Type of service:  Video Visit   Video Start Time: 3:08 PM  Video End Time:3:20 PM    Originating Location (pt. Location): Home    Distant Location (provider location):  On-site  Platform used for Video Visit: [facility] Psychiatry Consult Note    IDENTIFICATION   Name: Steve Morgan   : 1960/64 year old      Sex:    @ male          Telemedicine Visit: The patient's condition can be safely assessed and treated via synchronous audio and visual telemedicine encounter.        Face to Face/patient Contact total time: 12 minutes  Pre Charting time: 2 minutes; Post charting time, communication and other activities: 0 minutes; Total time 14 minutes  2:54 PM -           SUBJECTIVE     History of Present Illness    Steve Morgan, a 64-year-old male, reported that he has been experiencing some difficulty with blood pressure control. He also mentioned that his blood sugar control has been challenging at times. He expressed a need to explore other dietary alternatives to manage his diabetes, particularly to reduce his carbohydrate intake. He mentioned that he plans to consult with a diabetes educator and visit the Diabetes Association site for more information.    Steve has been on medication for his mental health, specifically mirtazapine and Viibryd. He reported that an increase in the dosage of these medications has been helpful. He is currently on 60 mg of mirtazapine and 20 mg of Viibryd. He recalled that when the dosage of mirtazapine was reduced to 30 mg, he became quite irritable and it negatively affected his relationships with others. Since the dosage was increased back to 60 mg, he feels that everything is working out great and he is in a good place mentally.    Steve also mentioned that his potassium levels have been high, which has been a challenge. He is currently on an additional blood pressure medication to help manage this. He reported no side effects from the  Viibryd or mirtazapine, and he is tolerating the mirtazapine well.    Steve also reported that he has neuropathy, which affects his balance. However, he stated that his balance has not worsened due to the medication changes. He expressed a desire to start biking again for exercise and plans to start the following day. He is also practicing mindfulness as part of a Dialectical Behavior Therapy (DBT) program.             The following assessments were completed by patient for this visit:  PROMIS 10-Global Health (only subscores and total score):       5/21/2023     4:35 PM 8/23/2023     2:16 PM 11/16/2023    10:10 AM 12/1/2023     4:00 PM 3/12/2024    10:26 AM 4/10/2024     2:15 PM 9/17/2024     2:20 PM   PROMIS-10 Scores Only   Global Mental Health Score 10 11 9 8 7 10    10 12    12   Global Physical Health Score 13 15 12 12 11 13    13 14    14   PROMIS TOTAL - SUBSCORES 23 26 21 20 18 23    23 26    26             9/9/2024     1:08 PM 9/17/2024     2:17 PM 10/29/2024     2:06 PM   PHQ   PHQ-9 Total Score 8 8 7    Q9: Thoughts of better off dead/self-harm past 2 weeks Not at all Not at all  Not at all        Patient-reported          6/30/2023     8:21 AM 3/12/2024    10:24 AM 7/9/2024     9:39 AM   JOSE MANUEL-7 SCORE   Total Score 3 (minimal anxiety) 6 (mild anxiety) 7 (mild anxiety)   Total Score 3 6 7        OBJECTIVE     Vital Signs:   There were no vitals taken for this visit.    Labs:    Results    - Patient's kidney clearance is at 37  - Potassium levels are reported to be high               Current Medications:  Current Outpatient Medications   Medication Sig Dispense Refill    mirtazapine (REMERON) 45 MG tablet Take 1 tablet (45 mg) by mouth at bedtime. 90 tablet 0    vilazodone (VIIBRYD) 20 MG TABS tablet Take 1 tablet (20 mg) by mouth daily. 30 tablet 1    acetaminophen (TYLENOL) 325 MG tablet Take 2 tablets (650 mg) by mouth every 4 hours as needed for other (For optimal non-opioid multimodal pain management  to improve pain control.)      aspirin 81 MG EC tablet Take 1 tablet (81 mg) by mouth 2 times daily 60 tablet 0    atenolol (TENORMIN) 25 MG tablet TAKE 1 TABLET BY MOUTH EVERY DAY 90 tablet 2    Calcium Carb-Cholecalciferol (CALCIUM 600 + D PO) Take 1 tablet by mouth daily      Continuous Glucose Sensor (FREESTYLE GIULIANA 2 SENSOR) MISC Inject 1 each subcutaneously every 14 days. Use 1 sensor every 14 days. Use to read blood sugars per 's instructions. 6 each 0    famotidine (PEPCID) 40 MG tablet TAKE 1 TABLET BY MOUTH EVERY DAY 90 tablet 2    ferrous sulfate 325 (65 FE) MG tablet Take 1 tablet by mouth daily (with breakfast).      finasteride (PROSCAR) 5 MG tablet TAKE 1 TABLET BY MOUTH EVERY DAY 90 tablet 1    gabapentin (NEURONTIN) 300 MG capsule Take 1 capsule (300 mg) by mouth 3 times daily 270 capsule 1    gabapentin (NEURONTIN) 300 MG capsule Take 1 capsule (300 mg) by mouth 3 times daily for 5 days 15 capsule 0    glipiZIDE (GLUCOTROL XL) 10 MG 24 hr tablet TAKE 1 TABLET (10 MG) BY MOUTH DAILY. 90 tablet 7    insulin glargine 100 UNIT/ML pen Inject 50 Units subcutaneously daily. 30 mL 2    insulin pen needle (B-D U/F) 31G X 5 MM miscellaneous USE 1 DAILY OR AS DIRECTED. 100 each 3    latanoprost (XALATAN) 0.005 % ophthalmic solution INSTILL 1 DROP INTO BOTH EYES IN THE EVENING      LEVOXYL 137 MCG tablet Take 1 tablet (137 mcg) by mouth daily. 90 tablet 3    losartan (COZAAR) 100 MG tablet Take 1 tablet (100 mg) by mouth daily 90 tablet 1    metFORMIN (GLUCOPHAGE) 1000 MG tablet Take 1 tablet (1,000 mg) by mouth 2 times daily (with meals). 180 tablet 0    MULTI-VITAMIN OR TABS Take 1 tablet by mouth daily 30 0    pantoprazole (PROTONIX) 40 MG EC tablet TAKE 1 TABLET (40 MG) BY MOUTH DAILY AS NEEDED FOR HEARTBURN 90 tablet 0    senna-docusate (SENOKOT-S/PERICOLACE) 8.6-50 MG tablet Take 1 tablet by mouth 2 times daily as needed for constipation 20 tablet 0    simvastatin (ZOCOR) 20 MG tablet Take  20 mg by mouth daily       No current facility-administered medications for this visit.            ADDED HISTORY   Patient is currently practicing mindfulness as part of a DBT program. He is also seeking dietary advice from a diabetes educator to manage his carbohydrate intake and improve his blood sugar control. No details on substance use, history, or treatment were provided.    Physical exam    Physical Exam    Physical exam  NEUROLOGIC: Patient reports neuropathy affecting balance.    Mental status exam  - Anxiety, depression, affect:  - Speech and language:  - Insight and judgment:  - Alert and orientation to person, place and situation:  - SI: None  - HI: None  - AH: None  - VH: None           MENTAL STATUS EXAMINATION:   Appearance: intact attention to grooming and hygiene  Attitude:  cooperative   Eye Contact:  good  Gait and Station: sitting  Psychomotor Behavior: Within normal limits  Oriented to:  Grossly person place and time  Attention Span and Concentration:  Grossly intact  Speech:  normal tone  Language: english  Mood: Fair  Affect: Euthymic  Associations:  no loose associations  Thought Process:  logical, linear and goal oriented  Thought Content:  No evidence of delusions or suicidal or homicidal ideation plan or intent  Memory: grossly intact  Fund of Knowledge: Good  Insight:  good  Judgment:  intact, adequate for safety  Impulse Control:  intact        DIAGNOSES:   Major Depressive Disorder, moderate, recurrent  SIADH/hyponatremia      ASSESSMENT:   Patient with history of depression, and considerable mood difficulties without prior combo of citalopram/bupropion affecting relationships negatively. Has comorbid diagnoses including SIADH/hyponatermia (possibly worse with selective serotonin reuptake inhibitor) historically, DM, obstructive sleep apnea, b12 deficiency.     Per literature review mirtazapine is less likely to cause hyponatremia, and for current eGFR no dosage necessary. Will pursue.   No hyponatremia detected with Viibryd.    Today Steve Morgan reports no suicidal ideations. In addition, he has notable risk factors for self-harm, including age and comorbid medical conditions. However, risk is mitigated by commitment to family, Gnosticist beliefs and absence of past attempts. Therefore, based on all available evidence including the factors cited above, he does not appear to be at imminent risk for self-harm, does not meet criteria for a 72-hr hold, and therefore remains appropriate for ongoing outpatient level of care.       PLAN:     Patient advised of consultative model. Patient will continue to be seen for ongoing consultation and stabilization.  Iterated consult model  Does not meet criteria for involuntary treatment or hospitalization  Add mirtazapine 5/25/23 7.5 mg po at bedtime x 3 nights then 15 mg po at bedtime slight benefit for anger=> 7/27/23 30 mg at bedtime efficacy partially, difficulty with irritability => 11/16/23 45 mg po at bedtime partial benefits => 2/27/24 60 mg at bedtime no effects at first, but later reports it is helping, worsened kidney function => 9/17/24 30 mg at bedtime mental health significantly worse, very irritable => 60 mg at bedtime => 10/29/24 45 mg qhs -Risks, benefits and alternatives discussed.  Patient provides verbal consent to treatment. Follow-up if balance is better  Add vilazodone 6/18/24 10 mg daily efficacy without side effects => 20 mg daily efficacy without side effects- Risks, benefits and alternatives discussed.  Patient provides verbal consent to treatment.  Discussed hyponatremia considerations.   Consider buspirone, lower doses  DC'd citalopram (hyponatremia/SIADH), bupropion  Labs - reviewed; track hyponatremia  Referred for MTM referral, priority on guidance for psychotropic selection and risk of hyponatremia  Therapy with JULIANE Carlin; switching to long term therapy with ISAIAH Hicks   Anger management course - to start June or  July  Motivational interviewing techniques done for exercise/biking - follow-up progress with goals  [facility] to send DBT resources to him      Assessment & Plan     Assessment  Patient's psychiatric status has improved following an increase in mirtazapine dosage. However, he continues to experience hypertension and hyperkalemia. His glomerular filtration rate (GFR) is currently at 37, which is above the threshold for mirtazapine dosage adjustment. However, considering potential future renal function decline, a reduction in mirtazapine dosage to 45 mg daily is recommended, with the possibility of titrating Viibryd dosage if clinically indicated.    Plan  - Reduce mirtazapine dosage to 45 mg daily  - Monitor for any side effects or increase in irritability  - If irritability increases, consider increasing Viibryd dosage  - Encourage patient to continue practicing mindfulness and seeking nutritional counseling for diabetes management  - Encourage patient to resume physical exercise, such as cycling  - Follow-up in six weeks    Prescription  - mirtazapine, 45 mg daily  - vilazodone (Viibryd), 20 mg daily  - Additional blood pressure medication    Appointments  Follow-up appointment in six weeks           Administrative Billing:   Time spent with patient was greater than 50% of time and/or significant time was spent in counseling and coordination of care regarding above diagnoses and treatment plan. Pre charting time and post charting time/documentation/coordination are done on date of service.      Signed:   Anant Pinto M.D.  [facility] Psychiatry Service    Disclaimer: This note consists of symbols derived from keyboarding, dictation and/or voice recognition software. As a result, there may be errors in the script that have gone undetected. Please consider this when interpreting information found in this chart.    Consent was obtained from the patient to use an AI documentation tool in the creation of this note.      eoc

## 2024-11-18 ENCOUNTER — TELEPHONE (OUTPATIENT)
Dept: GASTROENTEROLOGY | Facility: CLINIC | Age: 64
End: 2024-11-18
Payer: MEDICARE

## 2024-11-18 NOTE — TELEPHONE ENCOUNTER
Caller: Steve Morgan      Reason for Reschedule/Cancellation   (please be detailed, any staff messages or encounters to note?): Patient request to reschedule, wants to complete after the holidays      Prior to reschedule please review:  Ordering Provider: Lisa Pierre PA-C in [facility]  Sedation Determined: MODERATE  Does patient have any ASC Exclusions, please identify?: NO      Notes on Cancelled Procedure:  Procedure: Lower Endoscopy [Colonoscopy]   Date: 12/2  Location: [facility]; [address] [place] [address]  Surgeon: MARIAELENA      Rescheduled: Yes,   Procedure: Lower Endoscopy [Colonoscopy]    Date: 1/10   Location: [facility]; [address] [place] [address]   Surgeon: ANTIONE   Sedation Level Scheduled  MODERATE ,  Reason for Sedation Level PER ORDER   Instructions updated and sent: DELVIS     Does patient need PAC or Pre -Op Rescheduled? : NO       Did you cancel or rescheduled an EUS procedure? No.

## 2024-11-20 ENCOUNTER — NURSE TRIAGE (OUTPATIENT)
Dept: PEDIATRICS | Facility: CLINIC | Age: 64
End: 2024-11-20
Payer: MEDICARE

## 2024-11-20 NOTE — TELEPHONE ENCOUNTER
Attempt x 1. Called pt and left a message to call back to [phone] and to ask to speak to a nurse.     When pt calls back,     Relay message from provider below and assist with scheduling.     Pt called back,     Relayed message from Dr. Fowler below.   -Assisted pt in scheduling a visit with Jessica Fowler MD on 11/21 at 2:10.     Pt verbalizes understanding and is agreeable with plan. Pt denies any further questions or concerns at this time.     Cara CHAVARRIA, RN   Clinic RN  [facility]

## 2024-11-20 NOTE — TELEPHONE ENCOUNTER
Provider Response to 2nd Level Triage Request    I have reviewed the RN documentation. My recommendation is:  Face To Face Visit. Today or tomorrow with me or other provider.

## 2024-11-20 NOTE — TELEPHONE ENCOUNTER
Nurse Triage SBAR    Is this a 2nd Level Triage? YES, LICENSED PRACTITIONER REVIEW IS REQUIRED    Situation: patient calls to report shortness of breath with exertion     Background: Patient reports shortness of breath with exertion has been present on/off for a year. Happens when he is walking for 8-10 minutes or if he is working out in the yard.    Assessment: patient denies and current symptoms at time of call. Reports shortness of breath only happens with exertion. Last episode of shortness of breath happened over the weekend, was blowing leaves in yard and became short of breath. During this episode patient had slight cough, couldn't catch breath, and started dry heaving. Reported mild chest pain during episode.    At time of call: denies chest pain, shortness of breath, difficulty breathing.    Protocol Recommended Disposition:   Go To Office Now    Recommendation: patient has appointment 12/16 with Sindhu Mohan. Does patient need to be seen sooner?    Routed to provider    Guerita SYKES RN  11/20/2024 at 3:44 PM  [facility]      Reason for Disposition   MILD difficulty breathing (e.g., minimal/no SOB at rest, SOB with walking, pulse < 100) of new-onset or worse than normal    Additional Information   Negative: SEVERE difficulty breathing (e.g., struggling for each breath, speaks in single words, pulse > 120)   Negative: Breathing stopped and hasn't returned   Negative: Choking on something   Negative: Bluish (or gray) lips or face   Negative: Difficult to awaken or acting confused (e.g., disoriented, slurred speech)   Negative: Passed out (e.g., fainted, lost consciousness, blacked out and was not responding)   Negative: Wheezing started suddenly after medicine, an allergic food, or bee sting   Negative: Stridor (harsh sound while breathing in)   Negative: Slow, shallow and weak breathing   Negative: Sounds like a life-threatening emergency to the triager   Negative: Chest pain   Negative: Wheezing (high  "pitched whistling sound) and previous asthma attacks or use of asthma medicines   Negative: Breathing difficulty and within 14 days of COVID-19 EXPOSURE (close contact) with someone diagnosed with COVID-19 (e.g., COVID test positive)   Negative: Breathing difficulty and COVID-19 is widespread in the community   Negative: Breathing diffculty and only present when coughing   Negative: Breathing difficulty and only from stuffy nose   Negative: Breathing diffculty and only from stuffy nose or runny nose from common cold   Negative: MODERATE difficulty breathing (e.g., speaks in phrases, SOB even at rest, pulse 100-120) of new-onset or worse than normal   Negative: Oxygen level (e.g., pulse oximetry) 90% or lower   Negative: Wheezing can be heard across the room   Negative: Drooling or spitting out saliva (because can't swallow)   Negative: Any history of prior \"blood clot\" in leg or lungs   Negative: Illness requiring prolonged bedrest in past month (e.g., immobilization, long hospital stay)   Negative: Hip or leg fracture (broken bone) in past month (or had cast on leg or ankle in past month)   Negative: Major surgery in the past month   Negative: Long-distance travel in past month (e.g., car, bus, train, plane; with trip lasting 6 or more hours)   Negative: Cancer treatment in past six months (or has cancer now)   Negative: Extra heartbeats, irregular heart beating, or heart is beating very fast (i.e., 'palpitations')   Negative: Fever > 103 F (39.4 C)   Negative: Fever > 101 F (38.3 C) and over 60 years of age   Negative: Fever > 100 F (37.8 C) and bedridden (e.g., nursing home patient, stroke, chronic illness, recovering from surgery)   Negative: Fever > 100 F (37.8 C) and diabetes mellitus or weak immune system (e.g., HIV positive, cancer chemo, splenectomy, organ transplant, chronic steroids)   Negative: Periods where breathing stops and then resumes normally and bedridden (e.g., nursing home patient, CVA)   " "Negative: Pregnant or postpartum (from 0 to 6 weeks after delivery)   Negative: Patient sounds very sick or weak to the triager    Answer Assessment - Initial Assessment Questions  1. RESPIRATORY STATUS: \"Describe your breathing?\" (e.g., wheezing, shortness of breath, unable to speak, severe coughing)       Short of breath with exertion   2. ONSET: \"When did this breathing problem begin?\"       About a year ago  3. PATTERN \"Does the difficult breathing come and go, or has it been constant since it started?\"       Comes and goes, with exertion  4. SEVERITY: \"How bad is your breathing?\" (e.g., mild, moderate, severe)       moderate  5. RECURRENT SYMPTOM: \"Have you had difficulty breathing before?\" If Yes, ask: \"When was the last time?\" and \"What happened that time?\"       Has been on/off for the last year  6. CARDIAC HISTORY: \"Do you have any history of heart disease?\" (e.g., heart attack, angina, bypass surgery, angioplasty)       no  7. LUNG HISTORY: \"Do you have any history of lung disease?\"  (e.g., pulmonary embolus, asthma, emphysema)      no  8. CAUSE: \"What do you think is causing the breathing problem?\"       Maybe being out of shape, over weight  9. OTHER SYMPTOMS: \"Do you have any other symptoms?\" (e.g., chest pain, cough, dizziness, fever, runny nose)      Some chest discomfort when this happens, start to have a little cough, no dizziness  10. O2 SATURATION MONITOR:  \"Do you use an oxygen saturation monitor (pulse oximeter) at home?\" If Yes, ask: \"What is your reading (oxygen level) today?\" \"What is your usual oxygen saturation reading?\" (e.g., 95%)        N/a  11. PREGNANCY: \"Is there any chance you are pregnant?\" \"When was your last menstrual period?\"        N/a  12. TRAVEL: \"Have you traveled out of the country in the last month?\" (e.g., travel history, exposures)        Just got back from [place] for two weeks, this started before then    Protocols used: Breathing Difficulty-A-[place]    "

## 2024-11-21 ENCOUNTER — OFFICE VISIT (OUTPATIENT)
Dept: FAMILY MEDICINE | Facility: CLINIC | Age: 64
End: 2024-11-21
Payer: MEDICARE

## 2024-11-21 VITALS
RESPIRATION RATE: 22 BRPM | SYSTOLIC BLOOD PRESSURE: 144 MMHG | WEIGHT: 244 LBS | HEIGHT: 65 IN | OXYGEN SATURATION: 97 % | DIASTOLIC BLOOD PRESSURE: 83 MMHG | BODY MASS INDEX: 40.65 KG/M2 | TEMPERATURE: 97.9 F | HEART RATE: 62 BPM

## 2024-11-21 DIAGNOSIS — R06.02 SHORTNESS OF BREATH: Primary | ICD-10-CM

## 2024-11-21 DIAGNOSIS — E11.40 TYPE 2 DIABETES MELLITUS WITH DIABETIC NEUROPATHY, WITH LONG-TERM CURRENT USE OF INSULIN (H): ICD-10-CM

## 2024-11-21 DIAGNOSIS — I10 ESSENTIAL HYPERTENSION WITH GOAL BLOOD PRESSURE LESS THAN 140/90: ICD-10-CM

## 2024-11-21 DIAGNOSIS — Z12.11 SCREEN FOR COLON CANCER: ICD-10-CM

## 2024-11-21 DIAGNOSIS — Z79.4 TYPE 2 DIABETES MELLITUS WITH DIABETIC NEUROPATHY, WITH LONG-TERM CURRENT USE OF INSULIN (H): ICD-10-CM

## 2024-11-21 LAB
ANION GAP SERPL CALCULATED.3IONS-SCNC: 12 MMOL/L (ref 7–15)
BUN SERPL-MCNC: 25.7 MG/DL (ref 8–23)
CALCIUM SERPL-MCNC: 9.8 MG/DL (ref 8.8–10.4)
CHLORIDE SERPL-SCNC: 100 MMOL/L (ref 98–107)
CREAT SERPL-MCNC: 1.65 MG/DL (ref 0.67–1.17)
EGFRCR SERPLBLD CKD-EPI 2021: 46 ML/MIN/1.73M2
ERYTHROCYTE [DISTWIDTH] IN BLOOD BY AUTOMATED COUNT: 13.4 % (ref 10–15)
GLUCOSE SERPL-MCNC: 149 MG/DL (ref 70–99)
HCO3 SERPL-SCNC: 20 MMOL/L (ref 22–29)
HCT VFR BLD AUTO: 28.8 % (ref 40–53)
HGB BLD-MCNC: 10.2 G/DL (ref 13.3–17.7)
MCH RBC QN AUTO: 30.2 PG (ref 26.5–33)
MCHC RBC AUTO-ENTMCNC: 35.4 G/DL (ref 31.5–36.5)
MCV RBC AUTO: 85 FL (ref 78–100)
NT-PROBNP SERPL-MCNC: 76 PG/ML (ref 0–900)
PLATELET # BLD AUTO: 274 10E3/UL (ref 150–450)
POTASSIUM SERPL-SCNC: 5.1 MMOL/L (ref 3.4–5.3)
RBC # BLD AUTO: 3.38 10E6/UL (ref 4.4–5.9)
SODIUM SERPL-SCNC: 132 MMOL/L (ref 135–145)
WBC # BLD AUTO: 7.6 10E3/UL (ref 4–11)

## 2024-11-21 RX ORDER — LOSARTAN POTASSIUM 100 MG/1
100 TABLET ORAL DAILY
Qty: 90 TABLET | Refills: 1 | Status: SHIPPED | OUTPATIENT
Start: 2024-11-21

## 2024-11-21 RX ORDER — AMLODIPINE BESYLATE 5 MG/1
1 TABLET ORAL
COMMUNITY
Start: 2024-10-17

## 2024-11-21 ASSESSMENT — ENCOUNTER SYMPTOMS: SHORTNESS OF BREATH: 1

## 2024-11-21 ASSESSMENT — PATIENT HEALTH QUESTIONNAIRE - PHQ9
10. IF YOU CHECKED OFF ANY PROBLEMS, HOW DIFFICULT HAVE THESE PROBLEMS MADE IT FOR YOU TO DO YOUR WORK, TAKE CARE OF THINGS AT HOME, OR GET ALONG WITH OTHER PEOPLE: SOMEWHAT DIFFICULT
SUM OF ALL RESPONSES TO PHQ QUESTIONS 1-9: 12
SUM OF ALL RESPONSES TO PHQ QUESTIONS 1-9: 12

## 2024-11-21 NOTE — PROGRESS NOTES
"  {PROVIDER CHARTING PREFERENCE:100503}    Odalis Kearney is a 64 year old, presenting for the following health issues:  Shortness of Breath (C/O SOB with exertion which has been more current and increasing in occurrence recently)        11/21/2024     2:29 PM   Additional Questions   Roomed by Miriam   Accompanied by Self     Shortness of Breath    History of Present Illness       Reason for visit:  Shortness of breath    He eats 0-1 servings of fruits and vegetables daily.He consumes 0 sweetened beverage(s) daily.He exercises with enough effort to increase his heart rate 9 or less minutes per day.  He exercises with enough effort to increase his heart rate 3 or less days per week. He is missing 1 dose(s) of medications per week.  He is not taking prescribed medications regularly due to remembering to take.       {MA/LPN/RN Pre-Provider Visit Orders- hCG/UA/Strep (Optional):696176}  Shortness of breath with exertion   {additonal problems for provider to add (Optional):433031}    {ROS Picklists (Optional):534275}      Objective    BP (!) 144/83 (BP Location: Right arm, Patient Position: Sitting, Cuff Size: Adult Large)   Pulse 62   Temp 97.9  F (36.6  C) (Oral)   Resp 22   Ht 1.651 m (5' 5\")   Wt 110.7 kg (244 lb)   SpO2 97%   BMI 40.60 kg/m    Body mass index is 40.6 kg/m .  Physical Exam   {Exam List (Optional):374541}    {Diagnostic Test Results (Optional):567630}        Signed Electronically by: Jessica Fowler MD  {Email feedback regarding this note to [email or web address]   :[id number]}  "

## 2024-11-21 NOTE — PROGRESS NOTES
Assessment & Plan     Essential hypertension with goal blood pressure less than 140/90  Controlled on medications, refill given for Losartan   - losartan (COZAAR) 100 MG tablet; Take 1 tablet (100 mg) by mouth daily.    Screen for colon cancer      Shortness of breath  Chronic, started about 2 years ago, no significant changes, but it has becoming more often, his last stress test in 2023 was negative.  At thsi time, will order EKG, Chest xray , if both normal, will consult with cardiology if any further evaluation is needed.  This can be related to deconditioning given his multiple medical problems and his weight/lack of exercise.   - EKG 12-lead complete w/read - Clinics  - XR Chest 2 Views; Future  - Basic metabolic panel  (Ca, Cl, CO2, Creat, Gluc, K, Na, BUN); Future  - CBC with platelets; Future  - Basic metabolic panel  (Ca, Cl, CO2, Creat, Gluc, K, Na, BUN)  - CBC with platelets                Odalis Kearney is a 64 year old, presenting for the following health issues:  Shortness of Breath (C/O SOB which has been more current and increasing in occurrence recently)      11/21/2024     2:29 PM   Additional Questions   Roomed by Miriam   Accompanied by Self     HPI     Shortnesss of breath:  Started about 2 years ago, usually when he is active, like working in the yard, or walking for 5 to 7 minutes, when he start to get shortness of breath, also metallic taste in the mouth, feeling nausea, and some chest pain too. He usually rest, and belch few times, then it goes away.  This usually occurs after certain amount of walking or exertion and never on rest.  Pt had a stress test about 1 year ago in 6/2023 with negative results.    These symptoms never occurred when he is resting,           Review of Systems  Constitutional, HEENT, cardiovascular, pulmonary, gi and gu systems are negative, except as otherwise noted.      Objective    BP (!) 144/83 (BP Location: Right arm, Patient Position: Sitting, Cuff Size:  "Adult Large)   Pulse 62   Temp 97.9  F (36.6  C) (Oral)   Resp 22   Ht 1.651 m (5' 5\")   Wt 110.7 kg (244 lb)   SpO2 97%   BMI 40.60 kg/m    Body mass index is 40.6 kg/m .  Physical Exam   GENERAL: obese and uses walker.   NECK: no adenopathy, no asymmetry, masses, or scars  RESP: lungs clear to auscultation - no rales, rhonchi or wheezes  CV: regular rates and rhythm, normal S1 S2, no S3 or S4, no murmur, click or rub, and peripheral pulses strong  ABDOMEN: soft, non tender, no hepatosplenomegaly, no masses and bowel sounds normal  MS: edema bilaterally in both lower extremities.     EKG - Reviewed and interpreted by me appears normal, NSR, normal axis, normal intervals, no acute ST/T changes c/w ischemia, no LVH by voltage criteria, unchanged from previous tracings        Signed Electronically by: Jessica Fowler MD    "

## 2024-11-22 ENCOUNTER — ANCILLARY PROCEDURE (OUTPATIENT)
Dept: GENERAL RADIOLOGY | Facility: CLINIC | Age: 64
End: 2024-11-22
Attending: FAMILY MEDICINE
Payer: MEDICARE

## 2024-11-22 DIAGNOSIS — R06.02 SHORTNESS OF BREATH: ICD-10-CM

## 2024-11-22 PROCEDURE — 71046 X-RAY EXAM CHEST 2 VIEWS: CPT | Mod: TC | Performed by: RADIOLOGY

## 2024-11-27 ENCOUNTER — OFFICE VISIT (OUTPATIENT)
Dept: CARDIOLOGY | Facility: CLINIC | Age: 64
End: 2024-11-27
Attending: FAMILY MEDICINE
Payer: MEDICARE

## 2024-11-27 VITALS
HEIGHT: 65 IN | WEIGHT: 241 LBS | BODY MASS INDEX: 40.15 KG/M2 | HEART RATE: 56 BPM | DIASTOLIC BLOOD PRESSURE: 68 MMHG | SYSTOLIC BLOOD PRESSURE: 122 MMHG

## 2024-11-27 DIAGNOSIS — Z79.4 TYPE 2 DIABETES MELLITUS WITH HYPEROSMOLARITY WITHOUT COMA, WITH LONG-TERM CURRENT USE OF INSULIN (H): ICD-10-CM

## 2024-11-27 DIAGNOSIS — R06.02 SHORTNESS OF BREATH: ICD-10-CM

## 2024-11-27 DIAGNOSIS — R06.09 DOE (DYSPNEA ON EXERTION): Primary | ICD-10-CM

## 2024-11-27 DIAGNOSIS — E66.01 MORBID OBESITY (H): ICD-10-CM

## 2024-11-27 DIAGNOSIS — E11.00 TYPE 2 DIABETES MELLITUS WITH HYPEROSMOLARITY WITHOUT COMA, WITH LONG-TERM CURRENT USE OF INSULIN (H): ICD-10-CM

## 2024-11-27 NOTE — LETTER
11/27/2024    Physician No Ref-Primary  No address on file    RE: Steve Morgan       Dear Colleague,     I had the pleasure of seeing Steve Morgan in the [facility].  HPI and Plan:   It is my pleasure to see your patient Caio Morgan in consultation for shortness of breath.  This is a 64-year-old patient with type 2 diabetes mellitus and morbid obesity with a BMI of 40.1 who was noticed over the last 2 years that he becomes short of breath with exertion.  He finds that when he is working in the yard that he will become markedly short of breath and then develops dry heaves.  He has to sit down and catch his breath.  He is also noticed on occasions where he is woken up the middle of the night coughing and choking with a metallic taste in the back of his mouth which sounds very much like reflux with aspiration.  He has not been complaining of any chest pains, arm, throat or jaw discomfort with exertion.  As workup of the shortness of breath Dr. Fowler did measure a proBNP which was entirely normal which is very strong evidence that this is not congestive heart failure and a chest x-ray was also normal with no evidence of pulmonary edema.  EKG shows nonspecific T wave changes.  He his brother recently had bypass surgery.  His blood pressure was well-controlled here today at 122/68.  He has renal insufficiency with a creatinine of 1.65 and GFR 46 with a BUN of 25.7.  He is followed by Dr. Escalona from the nephrology service.    Impression:  1.  Dyspnea on exertion.  Congestive heart failure does not appear to be present.  Also his proBNP is normal and his chest x-ray is clear.  However ischemia does need to be ruled out and he has multiple risk factors for coronary artery disease.  I also suspect however that he may be developing dyspnea on exertion because of deconditioning and he is morbidly obese as well.  2.  I suspect that he is getting reflux at nighttime with some aspiration based upon his  history above.  3.  Morbid obesity.  4.  Renal insufficiency.    Plan:  1.  I would have the patient perform a standard echocardiogram to see if there is any structural abnormalities, valvular abnormalities or other issues such as pulmonary hypertension which could be causing shortness of breath.  2.  I would have the patient perform a dobutamine echocardiogram to determine if there is any evidence of ischemia.  3.  I will have the patient follow-up with one of our [facility]'s with the results of these investigations within the next month.    As always the patient been told to contact me if is any questions or any concerns.    Demar Brown MD, FACC, FRCPI      Orders Placed This Encounter   Procedures     Follow-Up with Cardiology [facility]     Dobutamine Stress Echocardiogram     Echocardiogram Complete       No orders of the defined types were placed in this encounter.      There are no discontinued medications.      Encounter Diagnoses   Name Primary?     Shortness of breath      COWART (dyspnea on exertion) Yes     Morbid obesity (H)      Type 2 diabetes mellitus with hyperosmolarity without coma, with long-term current use of insulin (H)        CURRENT MEDICATIONS:  Current Outpatient Medications   Medication Sig Dispense Refill     acetaminophen (TYLENOL) 325 MG tablet Take 2 tablets (650 mg) by mouth every 4 hours as needed for other (For optimal non-opioid multimodal pain management to improve pain control.)       amLODIPine (NORVASC) 5 MG tablet Take 1 tablet by mouth daily at 2 pm.       aspirin 81 MG EC tablet Take 1 tablet (81 mg) by mouth 2 times daily (Patient taking differently: Take 81 mg by mouth daily.) 60 tablet 0     atenolol (TENORMIN) 25 MG tablet TAKE 1 TABLET BY MOUTH EVERY DAY 90 tablet 2     bisacodyl (DULCOLAX) 5 MG EC tablet Take 2 tablets at 3 pm the day before your procedure. If your procedure is before 11 am, take 2 additional tablets at 11 pm. If your procedure is after 11 am, take 2  additional tablets at 6 am. For additional instructions refer to your colonoscopy prep instructions. 4 tablet 0     Calcium Carb-Cholecalciferol (CALCIUM 600 + D PO) Take 1 tablet by mouth daily       Continuous Glucose Sensor (FREESTYLE GIULIANA 2 SENSOR) MISC Inject 1 each subcutaneously every 14 days. Use 1 sensor every 14 days. Use to read blood sugars per 's instructions. 6 each 0     finasteride (PROSCAR) 5 MG tablet TAKE 1 TABLET BY MOUTH EVERY DAY 90 tablet 1     gabapentin (NEURONTIN) 300 MG capsule Take 1 capsule (300 mg) by mouth 3 times daily (Patient taking differently: Take 300 mg by mouth as needed.) 270 capsule 1     glipiZIDE (GLUCOTROL XL) 10 MG 24 hr tablet TAKE 1 TABLET (10 MG) BY MOUTH DAILY. 90 tablet 7     insulin glargine 100 UNIT/ML pen Inject 50 Units subcutaneously daily. 30 mL 2     insulin pen needle (B-D U/F) 31G X 5 MM miscellaneous USE 1 DAILY OR AS DIRECTED. 100 each 3     latanoprost (XALATAN) 0.005 % ophthalmic solution INSTILL 1 DROP INTO BOTH EYES IN THE EVENING       LEVOXYL 137 MCG tablet Take 1 tablet (137 mcg) by mouth daily. 90 tablet 3     losartan (COZAAR) 100 MG tablet Take 1 tablet (100 mg) by mouth daily. 90 tablet 1     metFORMIN (GLUCOPHAGE) 1000 MG tablet Take 1 tablet (1,000 mg) by mouth 2 times daily (with meals). 180 tablet 0     mirtazapine (REMERON) 45 MG tablet Take 1 tablet (45 mg) by mouth at bedtime. 90 tablet 0     MULTI-VITAMIN OR TABS Take 1 tablet by mouth daily 30 0     pantoprazole (PROTONIX) 40 MG EC tablet TAKE 1 TABLET (40 MG) BY MOUTH DAILY AS NEEDED FOR HEARTBURN 90 tablet 0     senna-docusate (SENOKOT-S/PERICOLACE) 8.6-50 MG tablet Take 1 tablet by mouth 2 times daily as needed for constipation 20 tablet 0     simvastatin (ZOCOR) 20 MG tablet Take 20 mg by mouth daily       vilazodone (VIIBRYD) 20 MG TABS tablet Take 1 tablet (20 mg) by mouth daily. 30 tablet 1     famotidine (PEPCID) 40 MG tablet TAKE 1 TABLET BY MOUTH EVERY DAY  (Patient not taking: Reported on 11/27/2024) 90 tablet 2     ferrous sulfate 325 (65 FE) MG tablet Take 1 tablet by mouth daily (with breakfast). (Patient not taking: Reported on 11/27/2024)       gabapentin (NEURONTIN) 300 MG capsule Take 1 capsule (300 mg) by mouth 3 times daily for 5 days 15 capsule 0     polyethylene glycol (GOLYTELY) 236 g suspension The night before the exam at 6 pm drink an 8-ounce glass every 15 minutes until the jug is half empty. If you arrive before 11 AM: Drink the other half of the Golytely jug at 11 PM night before procedure. If you arrive after 11 AM: Drink the other half of the Golytely jug at 6 AM day of procedure. For additional instructions refer to your colonoscopy prep instructions. (Patient not taking: Reported on 11/27/2024) 4000 mL 0       ALLERGIES   No Known Allergies    PAST MEDICAL HISTORY:  Past Medical History:   Diagnosis Date     Cellulitis and abscess of trunk 06/27/2017     Depression      Depressive disorder      Hyperlipidemia LDL goal <100 10/31/2010     Hypertension goal BP (blood pressure) < 140/90 03/17/2011     Hypothyroidism 01/12/2010     Morbid obesity due to excess calories (H) 01/12/2010     Neuropathy in diabetes (H) 01/12/2010     Obesity 01/12/2010     Sleep apnea 01/12/2010    CPAP     Type 2 diabetes, HbA1C goal < 8% (H) 03/08/2011       PAST SURGICAL HISTORY:  Past Surgical History:   Procedure Laterality Date     BIOPSY       BURSECTOMY KNEE Right 05/20/2024    Procedure: Excisional prepatellar bursectomy, right knee;  Surgeon: Justin Bo MD;  Location:  OR     COLONOSCOPY       COSMETIC SURGERY       EYE SURGERY       GENITOURINARY SURGERY      surg for undescended testicle     IRRIGATION AND DEBRIDEMENT HAND, COMBINED Right 12/23/2022    Procedure: Right long finger irrigation and debridement with partial amputation of the right long finger. ;  Surgeon: Colby English MD;  Location:  OR     REPAIR HAMMER TOE BILATERAL   05/16/2013    Procedure: REPAIR HAMMER TOE BILATERAL;  Flexor Tenotomy Toes 2,3,4,5 Bilateral Feet;  Surgeon: Saad Bangura DPM;  Location: [facility] OR       FAMILY HISTORY:  Family History   Problem Relation Age of Onset     Breast Cancer Mother      Hypertension Mother      Thyroid Disease Mother      Depression Mother      Alzheimer Disease Mother 82     Obesity Mother      Cancer - colorectal Father      Thyroid Disease Father      Depression Father      Other - See Comments Father         bladder polyps     Prostate Cancer Father      Other Cancer Father      Heart Disease Maternal Grandmother         CHF     Circulatory Paternal Grandmother      Cancer Paternal Grandfather      Diabetes Brother         Brother     Depression Brother      Diabetes Brother      Asthma Brother      Depression Brother      Anxiety Disorder Brother      Mental Illness Brother      Diabetes Other         Great Aunt       SOCIAL HISTORY:  Social History     Socioeconomic History     Marital status:      Spouse name: Sri     Number of children: 2     Years of education: None     Highest education level: Associate degree: occupational, technical, or vocational program   Occupational History     Occupation:      Employer: NONE      Comment: [employer]   Tobacco Use     Smoking status: Never     Smokeless tobacco: Never     Tobacco comments:     Smoked for about 6 months when I was 16 but haven't touched them since   Vaping Use     Vaping status: Never Used   Substance and Sexual Activity     Alcohol use: Yes     Comment: Occassionally     Drug use: Never     Sexual activity: Not Currently     Partners: Female     Birth control/protection: Abstinence   Other Topics Concern     Parent/sibling w/ CABG, MI or angioplasty before 65F 55M? No     Social Drivers of Health     Financial Resource Strain: Low Risk  (7/5/2024)    Financial Resource Strain      Within the past 12 months, have you or your family members you live  with been unable to get utilities (heat, electricity) when it was really needed?: No   Food Insecurity: Low Risk  (7/5/2024)    Food Insecurity      Within the past 12 months, did you worry that your food would run out before you got money to buy more?: No      Within the past 12 months, did the food you bought just not last and you didn t have money to get more?: No   Transportation Needs: Low Risk  (7/5/2024)    Transportation Needs      Within the past 12 months, has lack of transportation kept you from medical appointments, getting your medicines, non-medical meetings or appointments, work, or from getting things that you need?: No   Physical Activity: Inactive (7/5/2024)    Exercise Vital Sign      Days of Exercise per Week: 0 days      Minutes of Exercise per Session: 0 min   Stress: No Stress Concern Present (7/5/2024)    [facility] - Occupational Stress Questionnaire      Feeling of Stress : Only a little   Social Connections: Socially Integrated (2/8/2024)    Received from [facility], [facility]    Social Connections      Frequency of Communication with Friends and Family: 0   Interpersonal Safety: Low Risk  (7/9/2024)    Interpersonal Safety      Do you feel physically and emotionally safe where you currently live?: Yes      Within the past 12 months, have you been hit, slapped, kicked or otherwise physically hurt by someone?: No      Within the past 12 months, have you been humiliated or emotionally abused in other ways by your partner or ex-partner?: No   Housing Stability: Low Risk  (7/5/2024)    Housing Stability      Do you have housing? : Yes      Are you worried about losing your housing?: No       Review of Systems:  Skin:  not assessed       Eyes:  not assessed      ENT:  not assessed      Respiratory:  Positive for sleep apnea;CPAP;shortness of breath SOB usually occurs with activity  "  Cardiovascular:    Positive for;edema    Gastroenterology: not assessed      Genitourinary:  not assessed      Musculoskeletal:  not assessed      Neurologic:  not assessed      Psychiatric:  not assessed      Heme/Lymph/Imm:  not assessed      Endocrine:  not assessed        Physical Exam:  Vitals: /68 (BP Location: Right arm, Patient Position: Sitting, Cuff Size: Adult Large)   Pulse 56   Ht 1.651 m (5' 5\")   Wt 109.3 kg (241 lb)   BMI 40.10 kg/m      Constitutional:  cooperative, alert and oriented, well developed, well nourished, in no acute distress morbidly obese      Skin:  warm and dry to the touch          Head:  no masses or lesions        Eyes:  pupils equal and round        Lymph:      ENT:           Neck:  carotid pulses are full and equal bilaterally, JVP normal, no carotid bruit        Respiratory:  clear to auscultation         Cardiac: regular rhythm;normal S1 and S2;no murmurs, gallops or rubs detected   distant heart sounds            pulses full and equal                                        GI:           Extremities and Muscular Skeletal:  no deformities, clubbing, cyanosis, erythema observed;no edema              Neurological:  no gross motor deficits;affect appropriate        Psych:  Alert and Oriented x 3        CC  Jessica Fowler MD  [id number] [phone]                  Thank you for allowing me to participate in the care of your patient.      Sincerely,     Demar Brown MD, MD     [facility]  cc:   Jessica Fowler MD  [id number] [place] [address]      "

## 2024-11-27 NOTE — PROGRESS NOTES
HPI and Plan:   It is my pleasure to see your patient Caio Morgan in consultation for shortness of breath.  This is a 64-year-old patient with type 2 diabetes mellitus and morbid obesity with a BMI of 40.1 who was noticed over the last 2 years that he becomes short of breath with exertion.  He finds that when he is working in the yard that he will become markedly short of breath and then develops dry heaves.  He has to sit down and catch his breath.  He is also noticed on occasions where he is woken up the middle of the night coughing and choking with a metallic taste in the back of his mouth which sounds very much like reflux with aspiration.  He has not been complaining of any chest pains, arm, throat or jaw discomfort with exertion.  As workup of the shortness of breath Dr. Fowler did measure a proBNP which was entirely normal which is very strong evidence that this is not congestive heart failure and a chest x-ray was also normal with no evidence of pulmonary edema.  EKG shows nonspecific T wave changes.  He his brother recently had bypass surgery.  His blood pressure was well-controlled here today at 122/68.  He has renal insufficiency with a creatinine of 1.65 and GFR 46 with a BUN of 25.7.  He is followed by Dr. Escalona from the nephrology service.    Impression:  1.  Dyspnea on exertion.  Congestive heart failure does not appear to be present.  Also his proBNP is normal and his chest x-ray is clear.  However ischemia does need to be ruled out and he has multiple risk factors for coronary artery disease.  I also suspect however that he may be developing dyspnea on exertion because of deconditioning and he is morbidly obese as well.  2.  I suspect that he is getting reflux at nighttime with some aspiration based upon his history above.  3.  Morbid obesity.  4.  Renal insufficiency.    Plan:  1.  I would have the patient perform a standard echocardiogram to see if there is any structural abnormalities, valvular  abnormalities or other issues such as pulmonary hypertension which could be causing shortness of breath.  2.  I would have the patient perform a dobutamine echocardiogram to determine if there is any evidence of ischemia.  3.  I will have the patient follow-up with one of our [facility]'s with the results of these investigations within the next month.    As always the patient been told to contact me if is any questions or any concerns.    Demar Brown MD, FACC, FRCPI      Orders Placed This Encounter   Procedures    Follow-Up with Cardiology [facility]    Dobutamine Stress Echocardiogram    Echocardiogram Complete       No orders of the defined types were placed in this encounter.      There are no discontinued medications.      Encounter Diagnoses   Name Primary?    Shortness of breath     COWART (dyspnea on exertion) Yes    Morbid obesity (H)     Type 2 diabetes mellitus with hyperosmolarity without coma, with long-term current use of insulin (H)        CURRENT MEDICATIONS:  Current Outpatient Medications   Medication Sig Dispense Refill    acetaminophen (TYLENOL) 325 MG tablet Take 2 tablets (650 mg) by mouth every 4 hours as needed for other (For optimal non-opioid multimodal pain management to improve pain control.)      amLODIPine (NORVASC) 5 MG tablet Take 1 tablet by mouth daily at 2 pm.      aspirin 81 MG EC tablet Take 1 tablet (81 mg) by mouth 2 times daily (Patient taking differently: Take 81 mg by mouth daily.) 60 tablet 0    atenolol (TENORMIN) 25 MG tablet TAKE 1 TABLET BY MOUTH EVERY DAY 90 tablet 2    bisacodyl (DULCOLAX) 5 MG EC tablet Take 2 tablets at 3 pm the day before your procedure. If your procedure is before 11 am, take 2 additional tablets at 11 pm. If your procedure is after 11 am, take 2 additional tablets at 6 am. For additional instructions refer to your colonoscopy prep instructions. 4 tablet 0    Calcium Carb-Cholecalciferol (CALCIUM 600 + D PO) Take 1 tablet by mouth daily      Continuous  Glucose Sensor (FREESTYLE GIULIANA 2 SENSOR) [facility] Inject 1 each subcutaneously every 14 days. Use 1 sensor every 14 days. Use to read blood sugars per 's instructions. 6 each 0    finasteride (PROSCAR) 5 MG tablet TAKE 1 TABLET BY MOUTH EVERY DAY 90 tablet 1    gabapentin (NEURONTIN) 300 MG capsule Take 1 capsule (300 mg) by mouth 3 times daily (Patient taking differently: Take 300 mg by mouth as needed.) 270 capsule 1    glipiZIDE (GLUCOTROL XL) 10 MG 24 hr tablet TAKE 1 TABLET (10 MG) BY MOUTH DAILY. 90 tablet 7    insulin glargine 100 UNIT/ML pen Inject 50 Units subcutaneously daily. 30 mL 2    insulin pen needle (B-D U/F) 31G X 5 MM miscellaneous USE 1 DAILY OR AS DIRECTED. 100 each 3    latanoprost (XALATAN) 0.005 % ophthalmic solution INSTILL 1 DROP INTO BOTH EYES IN THE EVENING      LEVOXYL 137 MCG tablet Take 1 tablet (137 mcg) by mouth daily. 90 tablet 3    losartan (COZAAR) 100 MG tablet Take 1 tablet (100 mg) by mouth daily. 90 tablet 1    metFORMIN (GLUCOPHAGE) 1000 MG tablet Take 1 tablet (1,000 mg) by mouth 2 times daily (with meals). 180 tablet 0    mirtazapine (REMERON) 45 MG tablet Take 1 tablet (45 mg) by mouth at bedtime. 90 tablet 0    MULTI-VITAMIN OR TABS Take 1 tablet by mouth daily 30 0    pantoprazole (PROTONIX) 40 MG EC tablet TAKE 1 TABLET (40 MG) BY MOUTH DAILY AS NEEDED FOR HEARTBURN 90 tablet 0    senna-docusate (SENOKOT-S/PERICOLACE) 8.6-50 MG tablet Take 1 tablet by mouth 2 times daily as needed for constipation 20 tablet 0    simvastatin (ZOCOR) 20 MG tablet Take 20 mg by mouth daily      vilazodone (VIIBRYD) 20 MG TABS tablet Take 1 tablet (20 mg) by mouth daily. 30 tablet 1    famotidine (PEPCID) 40 MG tablet TAKE 1 TABLET BY MOUTH EVERY DAY (Patient not taking: Reported on 11/27/2024) 90 tablet 2    ferrous sulfate 325 (65 FE) MG tablet Take 1 tablet by mouth daily (with breakfast). (Patient not taking: Reported on 11/27/2024)      gabapentin (NEURONTIN) 300 MG capsule  Take 1 capsule (300 mg) by mouth 3 times daily for 5 days 15 capsule 0    polyethylene glycol (GOLYTELY) 236 g suspension The night before the exam at 6 pm drink an 8-ounce glass every 15 minutes until the jug is half empty. If you arrive before 11 AM: Drink the other half of the Golytely jug at 11 PM night before procedure. If you arrive after 11 AM: Drink the other half of the Golytely jug at 6 AM day of procedure. For additional instructions refer to your colonoscopy prep instructions. (Patient not taking: Reported on 11/27/2024) 4000 mL 0       ALLERGIES   No Known Allergies    PAST MEDICAL HISTORY:  Past Medical History:   Diagnosis Date    Cellulitis and abscess of trunk 06/27/2017    Depression     Depressive disorder     Hyperlipidemia LDL goal <100 10/31/2010    Hypertension goal BP (blood pressure) < 140/90 03/17/2011    Hypothyroidism 01/12/2010    Morbid obesity due to excess calories (H) 01/12/2010    Neuropathy in diabetes (H) 01/12/2010    Obesity 01/12/2010    Sleep apnea 01/12/2010    CPAP    Type 2 diabetes, HbA1C goal < 8% (H) 03/08/2011       PAST SURGICAL HISTORY:  Past Surgical History:   Procedure Laterality Date    BIOPSY      BURSECTOMY KNEE Right 05/20/2024    Procedure: Excisional prepatellar bursectomy, right knee;  Surgeon: Justin Bo MD;  Location:  OR    COLONOSCOPY      COSMETIC SURGERY      EYE SURGERY      GENITOURINARY SURGERY      surg for undescended testicle    IRRIGATION AND DEBRIDEMENT HAND, COMBINED Right 12/23/2022    Procedure: Right long finger irrigation and debridement with partial amputation of the right long finger. ;  Surgeon: Colby English MD;  Location: [facility] OR    REPAIR HAMMER TOE BILATERAL  05/16/2013    Procedure: REPAIR HAMMER TOE BILATERAL;  Flexor Tenotomy Toes 2,3,4,5 Bilateral Feet;  Surgeon: Saad Bangura DPM;  Location: [facility] OR       FAMILY HISTORY:  Family History   Problem Relation Age of Onset    Breast Cancer Mother      Hypertension Mother     Thyroid Disease Mother     Depression Mother     Alzheimer Disease Mother 82    Obesity Mother     Cancer - colorectal Father     Thyroid Disease Father     Depression Father     Other - See Comments Father         bladder polyps    Prostate Cancer Father     Other Cancer Father     Heart Disease Maternal Grandmother         CHF    Circulatory Paternal Grandmother     Cancer Paternal Grandfather     Diabetes Brother         Brother    Depression Brother     Diabetes Brother     Asthma Brother     Depression Brother     Anxiety Disorder Brother     Mental Illness Brother     Diabetes Other         Great Aunt       SOCIAL HISTORY:  Social History     Socioeconomic History    Marital status:      Spouse name: Sri    Number of children: 2    Years of education: None    Highest education level: Associate degree: occupational, technical, or vocational program   Occupational History    Occupation:      Employer: NONE      Comment: [employer]   Tobacco Use    Smoking status: Never    Smokeless tobacco: Never    Tobacco comments:     Smoked for about 6 months when I was 16 but haven't touched them since   Vaping Use    Vaping status: Never Used   Substance and Sexual Activity    Alcohol use: Yes     Comment: Occassionally    Drug use: Never    Sexual activity: Not Currently     Partners: Female     Birth control/protection: Abstinence   Other Topics Concern    Parent/sibling w/ CABG, MI or angioplasty before 65F 55M? No     Social Drivers of Health     Financial Resource Strain: Low Risk  (7/5/2024)    Financial Resource Strain     Within the past 12 months, have you or your family members you live with been unable to get utilities (heat, electricity) when it was really needed?: No   Food Insecurity: Low Risk  (7/5/2024)    Food Insecurity     Within the past 12 months, did you worry that your food would run out before you got money to buy more?: No     Within the past 12 months,  did the food you bought just not last and you didn t have money to get more?: No   Transportation Needs: Low Risk  (7/5/2024)    Transportation Needs     Within the past 12 months, has lack of transportation kept you from medical appointments, getting your medicines, non-medical meetings or appointments, work, or from getting things that you need?: No   Physical Activity: Inactive (7/5/2024)    Exercise Vital Sign     Days of Exercise per Week: 0 days     Minutes of Exercise per Session: 0 min   Stress: No Stress Concern Present (7/5/2024)    [employer] [facility] [employer] - Occupational Stress Questionnaire     Feeling of Stress : Only a little   Social Connections: Socially Integrated (2/8/2024)    Received from [facility] [facility], [facility] [employer] [facility] [employer]    Social Connections     Frequency of Communication with Friends and Family: 0   Interpersonal Safety: Low Risk  (7/9/2024)    Interpersonal Safety     Do you feel physically and emotionally safe where you currently live?: Yes     Within the past 12 months, have you been hit, slapped, kicked or otherwise physically hurt by someone?: No     Within the past 12 months, have you been humiliated or emotionally abused in other ways by your partner or ex-partner?: No   Housing Stability: Low Risk  (7/5/2024)    Housing Stability     Do you have housing? : Yes     Are you worried about losing your housing?: No       Review of Systems:  Skin:  not assessed       Eyes:  not assessed      ENT:  not assessed      Respiratory:  Positive for sleep apnea;CPAP;shortness of breath SOB usually occurs with activity   Cardiovascular:    Positive for;edema    Gastroenterology: not assessed      Genitourinary:  not assessed      Musculoskeletal:  not assessed      Neurologic:  not assessed      Psychiatric:  not assessed      Heme/Lymph/Imm:  not assessed      Endocrine:  not assessed        Physical Exam:  Vitals: /68  "(BP Location: Right arm, Patient Position: Sitting, Cuff Size: Adult Large)   Pulse 56   Ht 1.651 m (5' 5\")   Wt 109.3 kg (241 lb)   BMI 40.10 kg/m      Constitutional:  cooperative, alert and oriented, well developed, well nourished, in no acute distress morbidly obese      Skin:  warm and dry to the touch          Head:  no masses or lesions        Eyes:  pupils equal and round        Lymph:      ENT:           Neck:  carotid pulses are full and equal bilaterally, JVP normal, no carotid bruit        Respiratory:  clear to auscultation         Cardiac: regular rhythm;normal S1 and S2;no murmurs, gallops or rubs detected   distant heart sounds            pulses full and equal                                        GI:           Extremities and Muscular Skeletal:  no deformities, clubbing, cyanosis, erythema observed;no edema              Neurological:  no gross motor deficits;affect appropriate        Psych:  Alert and Oriented x 3        CC  Jessica Fowler MD  [id number] [place] [address]                "

## 2024-12-03 ENCOUNTER — MYC MEDICAL ADVICE (OUTPATIENT)
Dept: FAMILY MEDICINE | Facility: CLINIC | Age: 64
End: 2024-12-03
Payer: MEDICARE

## 2024-12-03 NOTE — TELEPHONE ENCOUNTER
SENT [facility]- PROVIDER OUT 12/4/24 / NEEDS R/S 12/3/24      Dione BUNCH, - [facility]  Primary Care- [facility]

## 2024-12-05 ENCOUNTER — TRANSFERRED RECORDS (OUTPATIENT)
Dept: HEALTH INFORMATION MANAGEMENT | Facility: CLINIC | Age: 64
End: 2024-12-05
Payer: MEDICARE

## 2024-12-05 LAB — RETINOPATHY: POSITIVE

## 2024-12-11 NOTE — PROGRESS NOTES
"    [facility]    Behavioral Health Clinician Progress Note   Mental Health & Addiction Services          Patient Name: Steve Morgan \"Caio\"      Service Type:  Individual   Service Location:  MyChart / Email (patient reached)   Visit Start Time: 12:28 PM  Visit End Time:  12:54 PM   Session Length: 16 - 37    Attendees: Patient   Service Modality: Video Visit:      Provider verified identity through the following two step process.  Patient provided:  Patient  and Patient address    Telemedicine Visit: The patient's condition can be safely assessed and treated via synchronous audio and visual telemedicine encounter.      Reason for Telemedicine Visit: Patient convenience (e.g. access to timely appointments / distance to available provider)    Originating Site (Patient Location): Patient's home    Distant Site (Provider Location): Provider Remote Setting- Home Office    Consent:  The patient/guardian has verbally consented to: the potential risks and benefits of telemedicine (video visit) versus in person care; bill my insurance or make self-payment for services provided; and responsibility for payment of non-covered services.     Patient would like the video invitation sent by:  My Chart    Mode of Communication:  Video Conference via Amwell    Distant Location (Provider):  Off-site    As the provider I attest to compliance with applicable laws and regulations related to telemedicine.   Visit number: 1    [facility] Visit Activities (Refresh list every visit): [facility] Only     [place] Diagnostic Assessment: 2024 by Margaret Miller at [facility]  Treatment Plan Review Date: 2024      DATA:    Extended Session (60+ minutes): No   Interactive Complexity: No   Crisis: No   [facility] Patient: No     Assessments completed prior to visit:   PHQ2:       2024    12:20 PM 2024     1:08 PM 2023    10:11 AM 3/10/2023     1:53 PM 2022 " "    4:29 PM 3/28/2022    10:47 AM 11/16/2021    10:14 AM   PHQ-2 ( 1999 [employer])   Q1: Little interest or pleasure in doing things 1 3 1  3 0 1    Q2: Feeling down, depressed or hopeless 0 1 1  1 0 1    PHQ-2 Score 1 4 2  4 0 2   Q1: Little interest or pleasure in doing things     Nearly every day  Several days   Q2: Feeling down, depressed or hopeless     Several days  Several days   PHQ-2 Score    Incomplete 4  2       Patient-reported     PHQ9:       5/1/2024    10:17 AM 6/18/2024     8:48 AM 7/9/2024     9:38 AM 9/9/2024     1:08 PM 9/17/2024     2:17 PM 10/29/2024     2:06 PM 11/21/2024     1:53 PM   PHQ-9 SCORE   PHQ-9 Total Score MyChart 9 (Mild depression) 11 (Moderate depression) 16 (Moderately severe depression)  8 (Mild depression) 7 (Mild depression) 12 (Moderate depression)   PHQ-9 Total Score 9 11 16 8 8 7  12        Patient-reported     GAD2:       5/21/2023     4:33 PM 8/23/2023     2:14 PM 11/16/2023    10:08 AM 2/26/2024     5:22 PM 5/31/2024    11:23 AM 6/18/2024     8:48 AM 9/17/2024     2:18 PM   JOSE MANUEL-2   Feeling nervous, anxious, or on edge 0  1  0  1  1  1  1    Not being able to stop or control worrying 0  0  0  0  1  1  1    JOSE MANUEL-2 Total Score 0 1 0 1    1 2 2    2 2    2       Patient-reported    Multiple values from one day are sorted in reverse-chronological order     GAD7:       10/17/2022    10:38 AM 12/20/2022     9:43 AM 1/27/2023    10:03 AM 3/10/2023     1:48 PM 6/30/2023     8:21 AM 3/12/2024    10:24 AM 7/9/2024     9:39 AM   JOSE MANUEL-7 SCORE   Total Score  2 (minimal anxiety) 2 (minimal anxiety) 5 (mild anxiety) 3 (minimal anxiety) 6 (mild anxiety) 7 (mild anxiety)   Total Score 6 2 2 5 3 6 7     \"     Reason for Visit/Presenting Concern:  Depression    Current Stressors / Issues:    update: Transfer [place]. [facility] reviewed assessment and treatment goals. Pt reports that he continues with his DBT therapy and has found it helpful. They are working on radical acceptance. His " therapist thinks he's made good progress. [facility] explored what has been most helpful. He reports using wise mind, improved affect management, UZAIR exercise, attending to his relationships. Pt and his wife are trying to support their son who is going through some relationship issues. He believes DBT skills have allowed him to be more supportive to his son during this time. Pt feels powerless at times over this situation. [facility] reinforced using radical acceptance. Pt's father is in a care center and his mother wants to bring him home but pt worries that he won't get the care he needs at home. Pt has expressed this to his M and she listens but still thinks about bringing him home. [facility] empathized. Pt is experiencing SOB. The cardiologist couldn't find anything concerning and has recommended that pt lose weight. [facility] and pt explored ways to develop healthy lifestyle. He enjoys biking walking but has nueropathy. He does have a staionary bike and is looking into [employer]. Bike: 5x/week in the AM, at a low to moderate speed for 5-10 minutes. He feels 50/50 chance he will start tomorrow. [facility] explored ways to increase the likelihood. He states tjat encouragement from his wife by reminding him of the benefits of getting healthy. He is confident that he can ask her tonight.   Stresses: son is having relationship issues (the mother of his child is refusing to let him see them), father's declining health, pt's experiencing SOB  Appetite: unremarkable  Sleep: unremarkable  Therapy: DBT (group and individual)  BELINDA: No  Preg: NA  Most important: medication update, he feels good on the 45 mirtazapine       Therapeutic Interventions:  Cognitive Behavioral Therapy (CBT): Identified behavioral changes that can be made to move towards treatment goals.   Psycho-education: Provided psycho-education about patient's behavioral health condition and symptoms.  Mindfulness: Provided psycho-education around developing mindfulness strategies.      Response to treatment interventions:   Patient was receptive to interventions utilized.  Patient was engaged in the therapy process.       Progress on Treatment Objective(s) / Homework:   Satisfactory progress - ACTION (Actively working towards change); Intervened by reinforcing change plan / affirming steps taken     Medication Review:   No changes to current psychiatric medication(s)     Medication Compliance:   Yes     Chemical Use Review:  Substance Use: Chemical use reviewed, no active concerns identified      Tobacco Use: No current tobacco use.       Assessment: Current Emotional / Mental Status (status of significant symptoms):    Risk status (Self / Other harm or suicidal ideation)   Patient denies a history of suicidal ideation, suicide attempts, self-injurious behavior, homicidal ideation, homicidal behavior, and and other safety concerns   Patient denies current fears or concerns for personal safety.   Patient denies current or recent suicidal ideation or behaviors.   Patient denies current or recent homicidal ideation or behaviors.   Patient denies current or recent self injurious behavior or ideation.   Patient denies other safety concerns.   Recommended that patient call 911 or go to the local ED should there be a change in any of these risk factors      ASSESSMENT:   Mental Status:     Appearance:   Appropriate    Eye Contact:   Good    Psychomotor Behavior: Normal    Attitude:   Cooperative    Orientation:   All   Speech Rate / Production: Normal    Volume:   Normal    Mood:    Depressed    Affect:    Appropriate    Thought Content:  Clear    Thought Form:  Coherent  Logical    Insight:    Good          Diagnoses:   Mild episode of recurrent major depressive disorder (H)    Collateral Reports Completed:   Collaborated with [facility] team        Plan: (Homework, other):   Patient was provided No indications of CD issues  Patient was given information about behavioral services and encouraged to  schedule a follow up appointment with the clinic [facility] in 1 month.         SAURABH Velazco, [facility]     _____________________________________________________________________________________________________________________________________                                              Individual Treatment Plan    Patient's Name: Steve Morgan   YOB: 1960  Date of Creation: 01/11/2024  Date Treatment Plan Last Reviewed/Revised: 12/11/2024    DSM5 Diagnoses: 296.31 (F33.0) Major Depressive Disorder, Recurrent Episode, Mild With anxious distress  Psychosocial / Contextual Factors: Relationship Concerns  PROMIS (reviewed every 90 days):   The following assessments were completed by patient for this visit:  PROMIS 10-Global Health (all questions and answers displayed):       5/21/2023     4:35 PM 8/23/2023     2:16 PM 11/16/2023    10:10 AM 12/1/2023     4:00 PM 3/12/2024    10:26 AM 4/10/2024     2:15 PM 9/17/2024     2:20 PM   PROMIS 10   In general, would you say your health is: Fair Fair Good  Fair Fair Poor   In general, would you say your quality of life is: Very good Good Good  Good Good Good   In general, how would you rate your physical health? Poor Poor Fair  Fair Poor Poor   In general, how would you rate your mental health, including your mood and your ability to think? Fair Fair Fair  Poor Fair Good   In general, how would you rate your satisfaction with your social activities and relationships? Fair Good Fair  Poor Fair Good   In general, please rate how well you carry out your usual social activities and roles Fair Fair Good  Fair Fair Good   To what extent are you able to carry out your everyday physical activities such as walking, climbing stairs, carrying groceries, or moving a chair? Mostly Completely Moderately  Not at all Moderately Mostly   In the past 7 days, how often have you been bothered by emotional problems such as feeling anxious, depressed, or irritable? Often Sometimes  Often  Often Sometimes Sometimes   In the past 7 days, how would you rate your fatigue on average? Moderate Mild Moderate  Moderate Mild Mild   In the past 7 days, how would you rate your pain on average, where 0 means no pain, and 10 means worst imaginable pain? 0 0 1  0 0 0   In general, would you say your health is: 2  2  3  2  2    2 1    In general, would you say your quality of life is: 4  3  3  3  3    3 3    In general, how would you rate your physical health? 1  1  2  2  1    1 1    In general, how would you rate your mental health, including your mood and your ability to think? 2  2  2  1  2    2 3    In general, how would you rate your satisfaction with your social activities and relationships? 2  3  2  1  2    2 3    In general, please rate how well you carry out your usual social activities and roles. (This includes activities at home, at work and in your community, and responsibilities as a parent, child, spouse, employee, friend, etc.) 2  2  3  2  2    2 3    To what extent are you able to carry out your everyday physical activities such as walking, climbing stairs, carrying groceries, or moving a chair? 4  5  3  1  3    3 4    In the past 7 days, how often have you been bothered by emotional problems such as feeling anxious, depressed, or irritable? 4  3  4  4  3    3 3    In the past 7 days, how would you rate your fatigue on average? 3  2  3  3  2    2 2    In the past 7 days, how would you rate your pain on average, where 0 means no pain, and 10 means worst imaginable pain? 0  0  1  0  0    0 0    Global Mental Health Score 10 11 9  7 10    10 12    12   Global Physical Health Score 13 15 12  11 13    13 14    14   PROMIS TOTAL - SUBSCORES 23 26 21  18 23    23 26    26       Information is confidential and restricted. Go to Review Flowsheets to unlock data.    Patient-reported    Multiple values from one day are sorted in reverse-chronological order     PROMIS 10-Global Health (only subscores and  "total score):       5/21/2023     4:35 PM 8/23/2023     2:16 PM 11/16/2023    10:10 AM 12/1/2023     4:00 PM 3/12/2024    10:26 AM 4/10/2024     2:15 PM 9/17/2024     2:20 PM   PROMIS-10 Scores Only   Global Mental Health Score 10 11 9  7 10    10 12    12   Global Physical Health Score 13 15 12  11 13    13 14    14   PROMIS TOTAL - SUBSCORES 23 26 21  18 23    23 26    26       Information is confidential and restricted. Go to Review Flowsheets to unlock data.    Multiple values from one day are sorted in reverse-chronological order        Referral / Collaboration:  Collaborated with [facility] team .    Anticipated number of session for this episode of care:  6-9 sessions  Anticipation frequency of session: Monthly  Anticipated Duration of each session: 16-37 minutes  Treatment plan will be reviewed in 90 days or when goals have been changed.       MeasurableTreatment Goal(s) related to diagnosis / functional impairment(s)  Goal 1: Patient will reduce frequency and intensity of depressive episode(s).   \"I am learning mindfulness and other DBT skills. Irritability hasn't been a huge problem.\"     Status: Continued - Date(s): 03/12/2025    Intervention(s)  [facility] will Cognitive Behavioral Therapy (CBT): Identify and challenge maladaptive patterns of behavior and thinking that interfere with patient's life  Psycho-education: Provide psycho-education about patient's behavioral health condition and symptoms.  Mindfulness: Provide psycho-education around developing mindfulness strategies.        Patient has reviewed and agreed to the above plan.     Written by  Jenni Bridges, SAURABH, [facility]       "

## 2024-12-12 ENCOUNTER — VIRTUAL VISIT (OUTPATIENT)
Dept: PSYCHIATRY | Facility: CLINIC | Age: 64
End: 2024-12-12
Payer: MEDICARE

## 2024-12-12 ENCOUNTER — HOSPITAL ENCOUNTER (OUTPATIENT)
Facility: CLINIC | Age: 64
End: 2024-12-12
Admitting: INTERNAL MEDICINE
Payer: MEDICARE

## 2024-12-12 ENCOUNTER — VIRTUAL VISIT (OUTPATIENT)
Dept: BEHAVIORAL HEALTH | Facility: CLINIC | Age: 64
End: 2024-12-12
Payer: MEDICARE

## 2024-12-12 VITALS — WEIGHT: 241 LBS | BODY MASS INDEX: 40.15 KG/M2 | HEIGHT: 65 IN

## 2024-12-12 DIAGNOSIS — F33.1 MAJOR DEPRESSIVE DISORDER, RECURRENT EPISODE, MODERATE (H): Primary | ICD-10-CM

## 2024-12-12 DIAGNOSIS — F33.0 MILD EPISODE OF RECURRENT MAJOR DEPRESSIVE DISORDER (H): Primary | ICD-10-CM

## 2024-12-12 ASSESSMENT — PAIN SCALES - GENERAL: PAINLEVEL_OUTOF10: NO PAIN (0)

## 2024-12-12 NOTE — PROGRESS NOTES
Virtual Visit Details    Type of service:  Video Visit   Video Start Time:  1300  Video End Time: 1318    Originating Location (pt. Location): Home    Distant Location (provider location):  Off-site  Platform used for Video Visit: [employer]      PSYCHIATRIC MEDICATION FOLLOW UP APPT     Name:  Steve Morgan  : 1960       Patient attended the phone/video session alone.    Last seen for outpatient psychiatry Return Visit on 10/29/2024.      FOLLOWING PLAN PUT INTO PLACE: Plan  - Reduce mirtazapine dosage to 45 mg daily  - Monitor for any side effects or increase in irritability  - If irritability increases, consider increasing Viibryd dosage  - Encourage patient to continue practicing mindfulness and seeking nutritional counseling for diabetes management  - Encourage patient to resume physical exercise, such as cycling  - Follow-up in six weeks    INTERIM HISTORY     COMMUNICATIONS FROM PATIENT VIA:  none     RECORDS AVAILABLE FOR REVIEW: EHR records through "[employer]  and previous psychiatric progress note.  In addition, reviewed the assessment completed by Jenni Bridges [facility], dated today        HISTORY OF PRESENT ILLNESS   CCPS referral for psychiatric medication consult in 2023.  Was being seen by Dr. Anant Pinto.  Reports history of depression, and considerable mood difficulties  affecting relationships negatively. Has comorbid diagnoses including SIADH/hyponatermia (possibly worse with selective serotonin reuptake inhibitor) historically, DM, obstructive sleep apnea, b12 deficiency.      Per literature review mirtazapine is less likely to cause hyponatremia, and for current eGFR no dosage necessary. Will pursue.  No hyponatremia detected with Viibryd.     Reports his of potassium levels high, which has been a challenge. He is currently on an additional blood pressure medication to help manage this. He reported no side effects from the Viibryd or mirtazapine, and he is tolerating the mirtazapine  well.     Steve also reported that he has neuropathy, which affects his balance. However, he stated that his balance has not worsened due to the medication changes. He expressed a desire to start biking again for exercise. He is also practicing mindfulness as part of a Dialectical Behavior Therapy (DBT) program.         MEDICATIONS                                                                                                Current Outpatient Medications   Medication Sig Dispense Refill    acetaminophen (TYLENOL) 325 MG tablet Take 2 tablets (650 mg) by mouth every 4 hours as needed for other (For optimal non-opioid multimodal pain management to improve pain control.)      amLODIPine (NORVASC) 5 MG tablet Take 1 tablet by mouth daily at 2 pm.      aspirin 81 MG EC tablet Take 1 tablet (81 mg) by mouth 2 times daily (Patient taking differently: Take 81 mg by mouth daily.) 60 tablet 0    atenolol (TENORMIN) 25 MG tablet TAKE 1 TABLET BY MOUTH EVERY DAY 90 tablet 2    bisacodyl (DULCOLAX) 5 MG EC tablet Take 2 tablets at 3 pm the day before your procedure. If your procedure is before 11 am, take 2 additional tablets at 11 pm. If your procedure is after 11 am, take 2 additional tablets at 6 am. For additional instructions refer to your colonoscopy prep instructions. 4 tablet 0    Calcium Carb-Cholecalciferol (CALCIUM 600 + D PO) Take 1 tablet by mouth daily      Continuous Glucose Sensor (FREESTYLE GIULIANA 2 SENSOR) MISC Inject 1 each subcutaneously every 14 days. Use 1 sensor every 14 days. Use to read blood sugars per 's instructions. 6 each 0    famotidine (PEPCID) 40 MG tablet TAKE 1 TABLET BY MOUTH EVERY DAY (Patient not taking: Reported on 11/27/2024) 90 tablet 2    ferrous sulfate 325 (65 FE) MG tablet Take 1 tablet by mouth daily (with breakfast). (Patient not taking: Reported on 11/27/2024)      finasteride (PROSCAR) 5 MG tablet TAKE 1 TABLET BY MOUTH EVERY DAY 90 tablet 1    gabapentin (NEURONTIN)  300 MG capsule Take 1 capsule (300 mg) by mouth 3 times daily (Patient taking differently: Take 300 mg by mouth as needed.) 270 capsule 1    gabapentin (NEURONTIN) 300 MG capsule Take 1 capsule (300 mg) by mouth 3 times daily for 5 days 15 capsule 0    glipiZIDE (GLUCOTROL XL) 10 MG 24 hr tablet TAKE 1 TABLET (10 MG) BY MOUTH DAILY. 90 tablet 7    insulin glargine 100 UNIT/ML pen Inject 50 Units subcutaneously daily. 30 mL 2    insulin pen needle (B-D U/F) 31G X 5 MM miscellaneous USE 1 DAILY OR AS DIRECTED. 100 each 3    latanoprost (XALATAN) 0.005 % ophthalmic solution INSTILL 1 DROP INTO BOTH EYES IN THE EVENING      LEVOXYL 137 MCG tablet Take 1 tablet (137 mcg) by mouth daily. 90 tablet 3    losartan (COZAAR) 100 MG tablet Take 1 tablet (100 mg) by mouth daily. 90 tablet 1    metFORMIN (GLUCOPHAGE) 1000 MG tablet Take 1 tablet (1,000 mg) by mouth 2 times daily (with meals). 180 tablet 0    mirtazapine (REMERON) 45 MG tablet Take 1 tablet (45 mg) by mouth at bedtime. 90 tablet 0    MULTI-VITAMIN OR TABS Take 1 tablet by mouth daily 30 0    pantoprazole (PROTONIX) 40 MG EC tablet TAKE 1 TABLET (40 MG) BY MOUTH DAILY AS NEEDED FOR HEARTBURN 90 tablet 0    polyethylene glycol (GOLYTELY) 236 g suspension The night before the exam at 6 pm drink an 8-ounce glass every 15 minutes until the jug is half empty. If you arrive before 11 AM: Drink the other half of the Golytely jug at 11 PM night before procedure. If you arrive after 11 AM: Drink the other half of the Golytely jug at 6 AM day of procedure. For additional instructions refer to your colonoscopy prep instructions. (Patient not taking: Reported on 11/27/2024) 4000 mL 0    senna-docusate (SENOKOT-S/PERICOLACE) 8.6-50 MG tablet Take 1 tablet by mouth 2 times daily as needed for constipation 20 tablet 0    simvastatin (ZOCOR) 20 MG tablet Take 20 mg by mouth daily      vilazodone (VIIBRYD) 20 MG TABS tablet Take 1 tablet (20 mg) by mouth daily. 30 tablet 1     No  "current facility-administered medications for this visit.          CURRENT MEDICATION SIDE EFFECTS REPORTED:  Denies    NOTES ABOUT CURRENT PSYCHOTROPIC MEDICATIONS:    gabapentin 300 mg three times a day as needed currently for nerve pain  Mirtazapine 45 mg daily  Viibryd 20 mg                      TODAY PATIENT REPORTS THE FOLLOWING PSYCHIATRIC ROS:   Per [facility], Jenni Bridges, during today's team-based visit:  \"MH update: Transfer from [facility]. [facility] reviewed assessment and treatment goals. Pt reports that he continues with his DBT therapy and has found it helpful. They are working on radical acceptance. His therapist thinks he's made good progress. [facility] explored what has been most helpful. He reports using wise mind, improved affect management, UZAIR exercise, attending to his relationships. Pt and his wife are trying to support their son who is going through some relationship issues. He believes DBT skills have allowed him to be more supportive to his son during this time. Pt feels powerless at times over this situation. [facility] reinforced using radical acceptance. Pt's father is in a care center and his mother wants to bring him home but pt worries that he won't get the care he needs at home. Pt has expressed this to his M and she listens but still thinks about bringing him home. [facility] empathized. Pt is experiencing SOB. The cardiologist couldn't find anything concerning and has recommended that pt lose weight. [facility] and pt explored ways to develop healthy lifestyle. He enjoys biking walking but has nueropathy. He does have a staionary bike and is looking into Silver Sneakers. Bike: 5x/week in the AM, at a low to moderate speed for 5-10 minutes. He feels 50/50 chance he will start tomorrow. [facility] explored ways to increase the likelihood. He states tjat encouragement from his wife by reminding him of the benefits of getting healthy. He is confident that he can ask her tonight.   Stresses: son is having relationship " "issues (the mother of his child is refusing to let him see them), father's declining health, pt's experiencing SOB  Appetite: unremarkable  Sleep: unremarkable  Therapy: DBT (group and individual)  BELINDA: No  Preg: NA  Most important: medication update, he feels good on the 45 mirtazapine\"     EXERCISE: Minimal exercise: related to to diabetic nerve pain  SIDE EFFECTS:   tolerating medications without reported side effects  COMPLIANCE:   states Adherent to medication regimen  REPORTS THE FOLLOWING NEW MEDICAL ISSUES:   none    PROBLEM: DEPRESSION: Feels the current medication regimen is effective.        11/21/2024     1:53 PM   Last PHQ-9   1.  Little interest or pleasure in doing things 2    2.  Feeling down, depressed, or hopeless 2    3.  Trouble falling or staying asleep, or sleeping too much 2    4.  Feeling tired or having little energy 2    5.  Poor appetite or overeating 2    6.  Feeling bad about yourself 0    7.  Trouble concentrating 0    8.  Moving slowly or restless 2    Q9: Thoughts of better off dead/self-harm past 2 weeks 0    PHQ-9 Total Score 12        Patient-reported         9/17/2024     2:17 PM 10/29/2024     2:06 PM 11/21/2024     1:53 PM   PHQ-9 SCORE   PHQ-9 Total Score MyChart 8 (Mild depression) 7 (Mild depression) 12 (Moderate depression)   PHQ-9 Total Score 8 7  12        Patient-reported     PHQ9 score is Not completed today  Suicidal ideation:  No     PROBLEM: ANXIETY: Improving. Feels less irritable and using the DBT skills is helping.   GAD7 score is Not completed today      6/30/2023     8:21 AM 3/12/2024    10:24 AM 7/9/2024     9:39 AM   JOSE MANUEL-7 SCORE   Total Score 3 (minimal anxiety) 6 (mild anxiety) 7 (mild anxiety)   Total Score 3 6 7        PROBLEM: SLEEP/INSOMNIA: Feels the current medication regimen is effective.       PERTINENT PAST MEDICAL AND SURGICAL HISTORY     Past Medical History:   Diagnosis Date    Cellulitis and abscess of trunk 06/27/2017    Depression     Depressive " "disorder     Hyperlipidemia LDL goal <100 10/31/2010    Hypertension goal BP (blood pressure) < 140/90 03/17/2011    Hypothyroidism 01/12/2010    Morbid obesity due to excess calories (H) 01/12/2010    Neuropathy in diabetes (H) 01/12/2010    Obesity 01/12/2010    Sleep apnea 01/12/2010    CPAP    Type 2 diabetes, HbA1C goal < 8% (H) 03/08/2011       VITALS   Ht 1.651 m (5' 5\")   Wt 109.3 kg (241 lb)   BMI 40.10 kg/m       BP Readings from Last 1 Encounters:   11/27/24 122/68     Pulse Readings from Last 1 Encounters:   11/27/24 56     Wt Readings from Last 1 Encounters:   12/12/24 109.3 kg (241 lb)     Ht Readings from Last 1 Encounters:   12/12/24 1.651 m (5' 5\")     Estimated body mass index is 40.1 kg/m  as calculated from the following:    Height as of this encounter: 1.651 m (5' 5\").    Weight as of this encounter: 109.3 kg (241 lb).    LABS & IMAGING                                                                                                                   Recent Labs   Lab Test 11/21/24  1458   WBC 7.6   HGB 10.2*   HCT 28.8*   MCV 85        Recent Labs   Lab Test 11/21/24  1458 09/27/24  1108 09/06/24  1054 08/26/24  1402 07/05/24  0815   *   < > 128*   < > 130*   POTASSIUM 5.1   < > 5.9*   < > 5.2   CHLORIDE 100   < > 96*   < > 98   CO2 20*   < > 20*   < > 22   *   < > 246*   < > 248*   CHAR 9.8   < > 9.3   < > 9.4   BUN 25.7*   < > 28.0*   < > 23.3*   CR 1.65*   < > 1.74*   < > 1.72*   GFRESTIMATED 46*   < > 43*   < > 43*   ALBUMIN  --   --  3.9  --  4.1   PROTTOTAL  --   --   --   --  7.3   AST  --   --   --   --  24   ALT  --   --   --   --  19   ALKPHOS  --   --   --   --  76   BILITOTAL  --   --   --   --  0.2    < > = values in this interval not displayed.     Recent Labs   Lab Test 10/21/24  1320 05/14/24  1303 02/26/24  1145   CHOL  --   --  206*   LDL  --   --  130*   HDL  --   --  26*   TRIG  --   --  252*   A1C 7.9*   < > 7.6*    < > = values in this interval not " displayed.     Recent Labs   Lab Test 08/26/24  1402   TSH 2.37   T4 1.47     25 OH Vit D2   Date Value Ref Range Status   08/18/2011 <5 ug/L Final   03/08/2011 <5 ug/L Final     25 OH Vit D3   Date Value Ref Range Status   08/18/2011 37 ug/L Final   03/08/2011 34 ug/L Final     25 OH Vit D total   Date Value Ref Range Status   08/18/2011  30 - 75 ug/L Final    <42  Season, race, dietary intake, and treatment affect the concentration of   25-hydroxy-Vitamin D. Values may decrease during winter months and increase   during summer months. Values less than 30 ug/L may indicate Vitamin D   deficiency.   03/08/2011  30 - 75 ug/L Final    <39  Season, race, dietary intake, and treatment affect the concentration of   25-hydroxy-Vitamin D. Values may decrease during winter months and increase   during summer months. Values less than 30 ug/L may indicate Vitamin D   deficiency.        ALLERGY & IMMUNIZATIONS     No Known Allergies    MEDICAL REVIEW OF SYSTEMS:   Ten system review was completed with pertinent positives noted     MENTAL STATUS EXAM:   General/Constitutional:  Appearance:   awake, alert, adequately groomed, appeared stated age and no apparent distress  Attitude:    cooperative   Eye Contact:  good  Musculoskeletal:  Psychomotor Behavior:  no evidence of tardive dyskinesia, dystonia, or tics from the head up  Psychiatric:  Speech:  clear, coherent, regular rate, rhythm, and volume,   No pressure speech noted.  Associations:  no loose associations  Thought Process:   logical, linear and goal oriented  Thought Content:    No evidence of suicidal ideation or homicidal ideation, no evidence of psychotic thought, no auditory hallucinations present and no visual hallucinations present  Mood:  good  Affect:  full range/stable (normal variation of emotions during exam) and was congruent to speech content.  Insight:  good  Judgment:  intact, adequate for safety  Impulse Control:  intact  Neurological:  Oriented to:   person, place, time, and situation  Attention Span and Concentration:  Able to attend to the interview      Language: intact     Recent and Remote Memory:  Intact to interview. Not formally assessed. No amnesia.    Fund of Knowledge: appropriate          SAFETY ASSESSMENT:   Based on all available evidence including the factors cited above, overall Risk for harm is low and is appropriate for outpatient level of care.   Recommended that patient call 911 or go to the local ED should there be a change in any of these risk factors.          LANGUAGE OR COMMUNICATION BARRIERS   Are there language or communication issues or need for modification in treatment? NO     Are there ethnic, cultural or Oriental orthodox factors that may be relevant for therapy? NO      Client identified their preferred language to be fluent English in conversational context  Does the client need the assistance of an  or other support involved in therapy? NO       DSM 5 DIAGNOSIS:    Major Depressive Disorder, moderate, recurrent  SIADH/hyponatremia    MEDICAL COMORBIDITY IMPACTING CLINICAL PICTURE: Diabetes, Geriatric, and history of hyponatremia and hypokalemia .  Known issue that I take into account for their medical decisions, no current exacerbations or new concerns      ASSESSMENT AND PLAN         Problem List Items Addressed This Visit          Behavioral     RETURN TO REFERRING PROVIDER FINAL TREATMENT PLAN  Psychiatrically stable at present. The Psychiatric provider and/or Behavioral health clinician (BHC) have completed consultation with the patient and are returning to referring provider care for continued care.  Will return to PCP for ongoing care, medication prescribing and future medication refills.   For worsening symptoms/condition recommendations include:    Medication Recommendations     Continue the   current dosing.  There is room to increase the Viibryd to 40 mg in the future if reemergence of depression or anxiety that is  causing functional impairment.    Consider pharmacologic and nonpharmacologic recommendations, if the patient has followed through on recommendations/referrals and any other helpful pertinent information (e.g., recent stressors, med adherence, enough time on the medication or high enough dose etc.)      If post consult recommendations fail, use any of the following options   Reach out to the CCPS provider through Epic staff message for guidance   Order an Adult Behavioral Health eConsult ([id number]) or Pediatric eConsult ([id number])   Refer for another CCPS consultation (after 6 months) or refer to Psychiatry/Long-term management (Mental Health Referral 9030, [facility]/Med management and Long-term management)                       Major depressive disorder, recurrent episode, moderate (H) - Primary    Relevant Orders    Graduation to Primary Care  - [facility] must complete           CONSULTS/REFERRALS:   Continue therapy   Coordinate care with therapist as needed      MEDICAL:   None at this time  Coordinate care with PCP (No Ref-Primary, Physician) as needed  Follow up with primary care provider as planned or for acute medical concerns.    PSYCHOEDUCATION:  Medication side effects and alternatives reviewed. Health promotion activities recommended and reviewed today. All questions addressed. Education and counseling completed regarding risks and benefits of medications and psychotherapy options.  Consent provided by patient/guardian  Call the psychiatric nurse line with medication questions or concerns at [phone].  TUBEhart may be used to communicate with your provider, but this is not intended to be used for emergencies.  BEERS CRITERIA: The American Geriatrics Society (AGS) released its second updated and expanded Beers Criteria - lists of potentially inappropriate medications for older adults - and one of the most frequently cited reference tools in the field of geriatrics. The Society also unveiled a suite of  new  resources - including a list of alternative therapies for potentially inappropriate medications and more detailed guidance on best practices for implementing AGS recommendations.  Discussed with client.   ActionFlow.gov is information for patients.  It is run by the [employer] and it contains information about all disorders, diseases and all medications.      COMMUNITY RESOURCES:    CRISIS NUMBERS: Provided in AVS 12/12/2024  National Suicide Prevention Lifeline: 7-318-837-TALK (956-882-6786)  Blip/resources for a list of additional resources (SOS)            [facility] - [phone]   Urgent Care Adult Mental [place]-[phone] mobile unit/ 24/7 crisis line  [facility] -[phone]   COPE 24/7 [place] Mobile Team -[phone] (adults)/ [phone] (child)  Poison Control Center - 1-883.105.7531    OR  go to nearest ER  Crisis Text Line for any crisis 24/7 send this-   To: 344527   [facility]  [phone]  National Suicide Prevention Lifeline: 478.851.9709 (TTY: 227.190.8752). Call anytime for help.  (www.suicidepreventionlifeline.org)  National [place] on Mental Illness (www.ethan.org): 316.711.5801 or 011-968-9669.   Mental Health Association (www.mentalhealth.org): 130.931.6230 or 532-236-6653.  [place] Crisis Text Line: Text [place] to 675233  Suicide LifeLine Chat: suicidePlaythe.net.org/chat    ADMINISTRATIVE BILLING:   Level of Medical Decision Making:   - At least 2 stable chronic problems  - Engaged in prescription drug management during visit (discussed any medication benefits, side effects, alternatives, etc.)         Patient Status:  Our psychiatry providers act as a specialty service for Primary Care Providers in the [facility] that seek to optimize medications for unstable patients.  Once medications have been optimized, our providers discharge the patient back to the referring  Primary Care Provider for ongoing medication management.  This type of system allows our providers to serve a high volume of patients. At this time  Patient has been stabilized and will be returned to PCP for ongoing care, medication prescribing and future medication refills.    3 months of medications fills provided.  Follow up with @PCP@ in about   3 months. Patient can be re-referred back to this service for further consultation in the future if needed but a new referral will need to be placed.    Signed:   Ann Khan, MAMTA, APRN, FMJAIMEP-[facility] Care Psychiatry Service ([facility])   Chart documentation done in part with Dragon Voice Recognition software.  Although reviewed after completion, some word and grammatical errors may remain.

## 2024-12-12 NOTE — ASSESSMENT & PLAN NOTE
RETURN TO REFERRING PROVIDER FINAL TREATMENT PLAN  Psychiatrically stable at present. The Psychiatric provider and/or Behavioral health clinician (BHC) have completed consultation with the patient and are returning to referring provider care for continued care.  Will return to PCP for ongoing care, medication prescribing and future medication refills.   For worsening symptoms/condition recommendations include:    Medication Recommendations     Continue the   current dosing.  There is room to increase the Viibryd to 40 mg in the future if reemergence of depression or anxiety that is causing functional impairment.    Consider pharmacologic and nonpharmacologic recommendations, if the patient has followed through on recommendations/referrals and any other helpful pertinent information (e.g., recent stressors, med adherence, enough time on the medication or high enough dose etc.)      If post consult recommendations fail, use any of the following options   Reach out to the CCPS provider through Epic staff message for guidance   Order an Adult Behavioral Health eConsult ([id number]) or Pediatric eConsult ([id number])   Refer for another [facility]PS consultation (after 6 months) or refer to Psychiatry/Long-term management (Mental Health Referral 9080, [facility]/Med management and Long-term management)

## 2024-12-12 NOTE — PATIENT INSTRUCTIONS
Moving from: Collaborative Care Psychiatry Service ([facility]PS)    Moving to: Referring Provider        We are returning your care back to your Referring Provider.      We will update your Referring Provider/Clinic that you've completed your care with [facility]. This way, they can help you build on the progress you've made in your mental health.        Here's what happens next:    Within the next 3 months: Please set up a visit with your referring provider. Ask for a mental health check-in.  Stay on your current medications--do not change your doses. Your symptoms could get worse if you quickly stop or decrease your medicine.  We've refilled your mental health medications for the next 3 months (90 days). Future refills or dose changes should come from your Referring Provider Clinic.    If you're in therapy, keep it up! If you're not but would like to start, ask your Referring Provider clinic for a referral to therapy, or call [facility] ([phone]) to set up a visit with a therapist.        It's been a pleasure to work with you! If you and your clinic decide that you should return to [facility] in the future, we remain ready to serve you. Ask your clinic for a new referral if needed.

## 2024-12-12 NOTE — Clinical Note
The patient has graduated from [facility] and being returned to the referring provider for ongoing care and medication prescribing.  The patient can be referred back to this service for further consultation as needed.  Please make a note should they call to schedule for follow-up.  Thank you!  Ann Khan, MSN, APRN, CNP, HNP-BC,  [facility]

## 2024-12-12 NOTE — NURSING NOTE
Current patient location: [id number]  [facility] [address]    Is the patient currently in the state of [place]? YES    Visit mode:VIDEO    If the visit is dropped, the patient can be reconnected by:VIDEO VISIT: Send to e-mail at: [email or web address]    Will anyone else be joining the visit? NO  (If patient encounters technical issues they should call [phone]    Are changes needed to the allergy or medication list? No    Are refills needed on medications prescribed by this physician? NO    Rooming Documentation:  Questionnaire(s) completed Questionnaire(s) not pre-assigned    Reason for visit: RECHECK    Kassie GORDILLO

## 2024-12-18 ENCOUNTER — ALLIED HEALTH/NURSE VISIT (OUTPATIENT)
Dept: EDUCATION SERVICES | Facility: CLINIC | Age: 64
End: 2024-12-18
Attending: FAMILY MEDICINE
Payer: MEDICARE

## 2024-12-18 DIAGNOSIS — E11.40 TYPE 2 DIABETES MELLITUS WITH DIABETIC NEUROPATHY, WITH LONG-TERM CURRENT USE OF INSULIN (H): ICD-10-CM

## 2024-12-18 DIAGNOSIS — Z79.4 TYPE 2 DIABETES MELLITUS WITH DIABETIC NEUROPATHY, WITH LONG-TERM CURRENT USE OF INSULIN (H): ICD-10-CM

## 2024-12-18 PROCEDURE — G0108 DIAB MANAGE TRN  PER INDIV: HCPCS

## 2024-12-18 RX ORDER — INSULIN GLARGINE 100 [IU]/ML
35 INJECTION, SOLUTION SUBCUTANEOUS DAILY
Qty: 30 ML | Refills: 2 | Status: SHIPPED | OUTPATIENT
Start: 2024-12-18

## 2024-12-18 NOTE — PROGRESS NOTES
Diabetes Self-Management Education & Support    Presents for: Individual review    Type of Service: In Person Visit      REPORTS:              Pt verbalized understanding of concepts discussed and recommendations provided today.       ASSESSMENT:    Minimum CGM data, pattern of overnight hypoglycemia and post breakfast hyperglycemia. Patient reports having sensor issues, and it not reading accurately. He talked with [facility] and they think it is related to his reader, and sent him a new one. He has not started using the reader. He reports feeling symptomatic of low blood sugars most mornings and self adjusted his insulin at least a month ago to 40 units daily, but reports still having some symptoms and seeing low readings on the reader. He has not done a fingerstick glucose to verify accuracy. His brother has the nina 3+ and he would like to upgrade if possible.     Recommend lower insulin for safety and do some fingerstick glucose testing to verify accuracy. Discussed healthy eating portion control and some alternate breakfast ideas to improve post meal hyperglycemia. Discussed the benefit of activity and precautions to take for safety. Discussed action and timing of medication and the importance of taking it every day.   He still has a couple of Nina 2 sensors, recommend discuss the Nina 3+ at his follow up next month.     PLAN  Meal Plan Recommendation: eat 3 meals a day, have small snacks between meals, if needed, use portion control, use plate planning method, and refer to the meal plans and snack ideas  Exercise / activity plan: try adding in some daily activity, start with 10 minutes a day, and increase as tolerated. Aim for 30 minutes a day.  Check blood sugars: continue using the Nina 2 sensor, make sure to scan at least every 8 hours to make sure we see all the data.    - If you are able to do a couple of fingerstick blood sugar checks per week to verify accuracy of your Nina sensor.   - we  "can discuss upgrading to the juan 3 + at your follow up next month.   Medication:  Metformin: continue 1 tablet twice   Glipizide: continue 1 tablet in the morning  Insulin: decrease to 35 units daily    Follow up:  Follow-up diabetes education appointment scheduled on 1/22/25 at 1:00pm.   Reach our sooner if your fingerstick glucose is a lot different than the sensor glucose.  Reach out sooner if you continue to have low blood sugar readings or are seeing high blood sugar readings.     See Care Plan for co-developed, patient-state behavior change goals.  AVS provided for patient today.    Education Materials Provided:  My Plate Planner and breakfast ideas and snack ideas      SUBJECTIVE/OBJECTIVE:  Presents for: Individual review  Accompanied by: Self  Diabetes education in the past 24mo: No  Focus of Visit: Healthy Eating  Diabetes type: Type 2  Disease course: Worsening  How confident are you filling out medical forms by yourself:: Extremely  Cultural Influences/Ethnic Background:  Not  or       Diabetes Symptoms & Complications:  Diabetes Related Symptoms: Fatigue, Polyphagia (increased hunger), Visual change  Weight trend: Increasing  Symptom course: Stable  Disease course: Worsening       Patient Problem List and Family Medical History reviewed for relevant medical history, current medical status, and diabetes risk factors.    Vitals:  There were no vitals taken for this visit.  Estimated body mass index is 40.1 kg/m  as calculated from the following:    Height as of 12/12/24: 1.651 m (5' 5\").    Weight as of 12/12/24: 109.3 kg (241 lb).   Last 3 BP:   BP Readings from Last 3 Encounters:   11/27/24 122/68   11/21/24 (!) 144/83   10/21/24 (!) 135/114       History   Smoking Status    Never   Smokeless Tobacco    Never       Labs:  Lab Results   Component Value Date    A1C 7.9 10/21/2024    A1C 7.4 02/22/2021     Lab Results   Component Value Date     11/21/2024     05/22/2024    GLC " "138 09/24/2021     01/16/2020     Lab Results   Component Value Date     02/26/2024    LDL 88 03/05/2020     HDL Cholesterol   Date Value Ref Range Status   03/05/2020 33 (L) >39 mg/dL Final     Direct Measure HDL   Date Value Ref Range Status   02/26/2024 26 (L) >=40 mg/dL Final   ]  GFR Estimate   Date Value Ref Range Status   11/21/2024 46 (L) >60 mL/min/1.73m2 Final     Comment:     eGFR calculated using 2021 CKD-EPI equation.   01/16/2020 79 >60 mL/min/[1.73_m2] Final     Comment:     Non  GFR Calc  Starting 12/18/2018, serum creatinine based estimated GFR (eGFR) will be   calculated using the Chronic Kidney Disease Epidemiology Collaboration   (CKD-EPI) equation.       GFR Estimate If Black   Date Value Ref Range Status   01/16/2020 >90 >60 mL/min/[1.73_m2] Final     Comment:      GFR Calc  Starting 12/18/2018, serum creatinine based estimated GFR (eGFR) will be   calculated using the Chronic Kidney Disease Epidemiology Collaboration   (CKD-EPI) equation.       Lab Results   Component Value Date    CR 1.65 11/21/2024    CR 1.01 01/16/2020     No results found for: \"MICROALBUMIN\"    Healthy Eating:  Healthy Eating Assessed Today: Yes  Cultural/Oriental orthodox diet restrictions?: No  How many times a week on average do you eat food made away from home (restaurant/take-out)?: 1  Meals include: Breakfast, Lunch, Dinner, Evening Snack  Breakfast: cereal ( shredded wheat, cheerios with splenda), milk, yogurt(light and fit)  Lunch: skip or ham sandwich  Dinner: [place] boiled dinner- (beef bone broth and vegetables) OR meatloaf, mashed potatoes,  Snacks: BBQ chips, pretzels, salty snacks, sweets- cookies, cake, candy  Beverages: Water, Milk  Has patient met with a dietitian in the past?: Yes    Being Active:  Being Active Assessed Today: Yes  Exercise:: Currently not exercising  Barrier to exercise: Physical limitation    Monitoring:  Monitoring Assessed Today: Yes  Did " patient bring glucose meter to appointment? : Yes  Blood Glucose Meter: FreeStyle, CGM  Times checking blood sugar at home (number): Other (see Comments)  Times checking blood sugar at home (per): Day      Taking Medications:  Diabetes Medication(s)       Biguanides       metFORMIN (GLUCOPHAGE) 1000 MG tablet Take 1 tablet (1,000 mg) by mouth 2 times daily (with meals).       Insulin       insulin glargine 100 UNIT/ML pen Inject 50 Units subcutaneously daily.       Sulfonylureas       glipiZIDE (GLUCOTROL XL) 10 MG 24 hr tablet TAKE 1 TABLET (10 MG) BY MOUTH DAILY.            Taking Medication Assessed Today: Yes  Current Treatments: Insulin Injections, Oral Medication (taken by mouth)  Given by: Patient  Problems taking diabetes medications regularly?: Yes (consistently taking the insulin but misses oral medication)  Diabetes medication side effects?: No    Problem Solving:  Problem Solving Assessed Today: Yes  Is the patient at risk for hypoglycemia?: Yes  Hypoglycemia Frequency: Daily    Hypoglycemia Symptoms  Hypoglycemia: Feeling shaky         Reducing Risks:  Reducing Risks Assessed Today: Yes  Diabetes Risks: Age over 45 years, Sedentary Lifestyle, Family History  CAD Risks: Diabetes Mellitus, Family history, Obesity, Sedentary lifestyle, Hypertension, Male sex  Has dilated eye exam at least once a year?: Yes  Sees dentist every 6 months?: Yes  Feet checked by healthcare provider in the last year?: No    Healthy Coping:  Healthy Coping Assessed Today: Yes  Emotional response to diabetes: Ready to learn  Informal Support system:: Children, Family, Spouse  Stage of change: ACTION (Actively working towards change)  Patient Activation Measure Survey Score:      1/15/2020     8:18 PM 12/16/2022     2:26 PM   SHANNON Score (Last Two)   SHANNON Raw Score 36 34   Activation Score 75.5 68.9   SHANNON Level 4 3         Care Plan and Education Provided:  Healthy Eating: Balanced meals, Consistency in amount and timing of  carbohydrate intake, Plate planning method, and Portion control, Being Active: Finding a physical activity routine that works for you, Precautions to take with exercise, and Relationship of activity to glucose, Monitoring: Blood glucose versus Continuous Glucose Monitoring and Individual glucose targets, Taking Medication: Action of prescribed medication(s), Side effects of prescribed medication(s), and When to take medication(s), Problem Solving: Low glucose - causes, signs/symptoms, treatment and prevention.    Megan LOWEN, RN, PHN, [facility]     Time Spent: 60 minutes  Encounter Type: Individual    Any diabetes medication dose changes were made via the CDE Protocol per the patient's referring provider. A copy of this encounter was shared with the provider.

## 2024-12-18 NOTE — PATIENT INSTRUCTIONS
Care Plan:  Meal Plan Recommendation: eat 3 meals a day, have small snacks between meals, if needed, use portion control, use plate planning method, and refer to the meal plans and snack ideas  Exercise / activity plan: try adding in some daily activity, start with 10 minutes a day, and increase as tolerated. Aim for 30 minutes a day.  Check blood sugars: continue using the Nina 2 sensor, make sure to scan at least every 8 hours to make sure we see all the data.    - If you are able to do a couple of fingerstick blood sugar checks per week to verify accuracy of your Nina sensor.   - we can discuss upgrading to the nina 3 + at your follow up next month.   Medication:  Metformin: continue 1 tablet twice   Glipizide: continue 1 tablet in the morning  Insulin: decrease to 35 units daily    Follow up:  Follow-up diabetes education appointment scheduled on 1/22/25 at 1:00pm.   Reach our sooner if your fingerstick glucose is a lot different than the sensor glucose.  Reach out sooner if you continue to have low blood sugar readings or are seeing high blood sugar readings.      Bring blood glucose meter and logbook with you to all doctor and follow-up appointments.     [facility] [place] for the [facility]:  For Your Diabetes or Nutrition Education Appointments Call:  [phone]   For Diabetes or Nutrition Related Questions:   [phone]  Send [employer] Message   If you need a medication refill please contact your pharmacy. Please allow 3 business days for your refills to be completed.

## 2024-12-18 NOTE — LETTER
12/18/2024         RE: Steve Morgan  [address]  [facility] [address]        Dear Colleague,    Thank you for referring your patient, Steve Morgan, to the [facility]. Please see a copy of my visit note below.      Diabetes Self-Management Education & Support    Presents for: Individual review    Type of Service: In Person Visit      REPORTS:              Pt verbalized understanding of concepts discussed and recommendations provided today.       ASSESSMENT:  ***  Minimum CGM data, pattern of overnight hypoglycemia and post breakfast hyperglycemia. Patient reports having sensor issues, and it not reading accurately. He talked with [facility] and they think it is related to his reader, and sent him a new one. He has not started using the reader. He reports feeling symptomatic of low blood sugars most mornings and self adjusted his insulin at least a month ago to 40 units daily, but reports still having some symptoms and seeing low readings on the reader. He has not done a fingerstick glucose to verify accuracy. His brother has the juan 3+ and he would like to upgrade if possible.     Recommend lower insulin for safety and do some fingerstick glucose testing to verify accuracy. Discussed healthy eating portion control and some alternate breakfast ideas to improve post meal hyperglycemia. Discussed the benefit of activity and precautions to take for safety. Discussed action and timing of medication and the importance of taking it every day.   He still has a couple of Juan 2 sensors, recommend discuss the Juan 3+ at his follow up next month.     PLAN  Meal Plan Recommendation: eat 3 meals a day, have small snacks between meals, if needed, use portion control, use plate planning method, and refer to the meal plans and snack ideas  Exercise / activity plan: try adding in some daily activity, start with 10 minutes a day, and increase as tolerated. Aim for 30 minutes a  "day.  Check blood sugars: continue using the Nina 2 sensor, make sure to scan at least every 8 hours to make sure we see all the data.    - If you are able to do a couple of fingerstick blood sugar checks per week to verify accuracy of your Nina sensor.   - we can discuss upgrading to the nina 3 + at your follow up next month.   Medication:  Metformin: continue 1 tablet twice   Glipizide: continue 1 tablet in the morning  Insulin: decrease to 35 units daily    Follow up:  Follow-up diabetes education appointment scheduled on 1/22/25 at 1:00pm.   Reach our sooner if your fingerstick glucose is a lot different than the sensor glucose.  Reach out sooner if you continue to have low blood sugar readings or are seeing high blood sugar readings.     See Care Plan for co-developed, patient-state behavior change goals.  AVS provided for patient today.    Education Materials Provided:  My Plate Planner and breakfast ideas and snack ideas      SUBJECTIVE/OBJECTIVE:  Presents for: Individual review  Accompanied by: Self  Diabetes education in the past 24mo: No  Focus of Visit: Healthy Eating  Diabetes type: Type 2  Disease course: Worsening  How confident are you filling out medical forms by yourself:: Extremely  Cultural Influences/Ethnic Background:  Not  or       Diabetes Symptoms & Complications:  Diabetes Related Symptoms: Fatigue, Polyphagia (increased hunger), Visual change  Weight trend: Increasing  Symptom course: Stable  Disease course: Worsening       Patient Problem List and Family Medical History reviewed for relevant medical history, current medical status, and diabetes risk factors.    Vitals:  There were no vitals taken for this visit.  Estimated body mass index is 40.1 kg/m  as calculated from the following:    Height as of 12/12/24: 1.651 m (5' 5\").    Weight as of 12/12/24: 109.3 kg (241 lb).   Last 3 BP:   BP Readings from Last 3 Encounters:   11/27/24 122/68   11/21/24 (!) 144/83   10/21/24 " "(!) 135/114       History   Smoking Status     Never   Smokeless Tobacco     Never       Labs:  Lab Results   Component Value Date    A1C 7.9 10/21/2024    A1C 7.4 02/22/2021     Lab Results   Component Value Date     11/21/2024     05/22/2024     09/24/2021     01/16/2020     Lab Results   Component Value Date     02/26/2024    LDL 88 03/05/2020     HDL Cholesterol   Date Value Ref Range Status   03/05/2020 33 (L) >39 mg/dL Final     Direct Measure HDL   Date Value Ref Range Status   02/26/2024 26 (L) >=40 mg/dL Final   ]  GFR Estimate   Date Value Ref Range Status   11/21/2024 46 (L) >60 mL/min/1.73m2 Final     Comment:     eGFR calculated using 2021 CKD-EPI equation.   01/16/2020 79 >60 mL/min/[1.73_m2] Final     Comment:     Non  GFR Calc  Starting 12/18/2018, serum creatinine based estimated GFR (eGFR) will be   calculated using the Chronic Kidney Disease Epidemiology Collaboration   (CKD-EPI) equation.       GFR Estimate If Black   Date Value Ref Range Status   01/16/2020 >90 >60 mL/min/[1.73_m2] Final     Comment:      GFR Calc  Starting 12/18/2018, serum creatinine based estimated GFR (eGFR) will be   calculated using the Chronic Kidney Disease Epidemiology Collaboration   (CKD-EPI) equation.       Lab Results   Component Value Date    CR 1.65 11/21/2024    CR 1.01 01/16/2020     No results found for: \"MICROALBUMIN\"    Healthy Eating:  Healthy Eating Assessed Today: Yes  Cultural/Religion diet restrictions?: No  How many times a week on average do you eat food made away from home (restaurant/take-out)?: 1  Meals include: Breakfast, Lunch, Dinner, Evening Snack  Breakfast: cereal ( shredded wheat, cheerios with splenda), milk, yogurt(light and fit)  Lunch: skip or ham sandwich  Dinner: [place] boiled dinner- (beef bone broth and vegetables) OR meatloaf, mashed potatoes,  Snacks: BBQ chips, pretzels, salty snacks, sweets- cookies, cake, " candy  Beverages: Water, Milk  Has patient met with a dietitian in the past?: Yes    Being Active:  Being Active Assessed Today: Yes  Exercise:: Currently not exercising  Barrier to exercise: Physical limitation    Monitoring:  Monitoring Assessed Today: Yes  Did patient bring glucose meter to appointment? : Yes  Blood Glucose Meter: FreeStyle, CGM  Times checking blood sugar at home (number): Other (see Comments)  Times checking blood sugar at home (per): Day      Taking Medications:  Diabetes Medication(s)       Biguanides       metFORMIN (GLUCOPHAGE) 1000 MG tablet Take 1 tablet (1,000 mg) by mouth 2 times daily (with meals).       Insulin       insulin glargine 100 UNIT/ML pen Inject 50 Units subcutaneously daily.       Sulfonylureas       glipiZIDE (GLUCOTROL XL) 10 MG 24 hr tablet TAKE 1 TABLET (10 MG) BY MOUTH DAILY.            Taking Medication Assessed Today: Yes  Current Treatments: Insulin Injections, Oral Medication (taken by mouth)  Given by: Patient  Problems taking diabetes medications regularly?: Yes (consistently taking the insulin but misses oral medication)  Diabetes medication side effects?: No    Problem Solving:  Problem Solving Assessed Today: Yes  Is the patient at risk for hypoglycemia?: Yes  Hypoglycemia Frequency: Daily    Hypoglycemia Symptoms  Hypoglycemia: Feeling shaky         Reducing Risks:  Reducing Risks Assessed Today: Yes  Diabetes Risks: Age over 45 years, Sedentary Lifestyle, Family History  CAD Risks: Diabetes Mellitus, Family history, Obesity, Sedentary lifestyle, Hypertension, Male sex  Has dilated eye exam at least once a year?: Yes  Sees dentist every 6 months?: Yes  Feet checked by healthcare provider in the last year?: No    Healthy Coping:  Healthy Coping Assessed Today: Yes  Emotional response to diabetes: Ready to learn  Informal Support system:: Children, Family, Spouse  Stage of change: ACTION (Actively working towards change)  Patient Activation Measure Survey  Score:      1/15/2020     8:18 PM 12/16/2022     2:26 PM   SHANNON Score (Last Two)   SHANNON Raw Score 36 34   Activation Score 75.5 68.9   SHANNON Level 4 3         Care Plan and Education Provided:  Healthy Eating: Balanced meals, Consistency in amount and timing of carbohydrate intake, Plate planning method, and Portion control, Being Active: Finding a physical activity routine that works for you, Precautions to take with exercise, and Relationship of activity to glucose, Monitoring: Blood glucose versus Continuous Glucose Monitoring and Individual glucose targets, Taking Medication: Action of prescribed medication(s), Side effects of prescribed medication(s), and When to take medication(s), Problem Solving: Low glucose - causes, signs/symptoms, treatment and prevention.    Megan LOWEN, RN, PHN, [facility]     Time Spent: 60 minutes  Encounter Type: Individual    Any diabetes medication dose changes were made via the CDE Protocol per the patient's referring provider. A copy of this encounter was shared with the provider.

## 2024-12-20 ENCOUNTER — HOSPITAL ENCOUNTER (OUTPATIENT)
Dept: CARDIOLOGY | Facility: CLINIC | Age: 64
Discharge: HOME | End: 2024-12-20
Attending: INTERNAL MEDICINE | Admitting: INTERNAL MEDICINE
Payer: MEDICARE

## 2024-12-20 DIAGNOSIS — R06.02 SHORTNESS OF BREATH: ICD-10-CM

## 2024-12-20 LAB — LVEF ECHO: NORMAL

## 2024-12-20 PROCEDURE — 93306 TTE W/DOPPLER COMPLETE: CPT

## 2024-12-24 ENCOUNTER — TELEPHONE (OUTPATIENT)
Dept: GASTROENTEROLOGY | Facility: CLINIC | Age: 64
End: 2024-12-24
Payer: MEDICARE

## 2024-12-24 DIAGNOSIS — Z12.11 SPECIAL SCREENING FOR MALIGNANT NEOPLASMS, COLON: Primary | ICD-10-CM

## 2024-12-24 RX ORDER — BISACODYL 5 MG/1
TABLET, DELAYED RELEASE ORAL
Qty: 4 TABLET | Refills: 0 | Status: SHIPPED | OUTPATIENT
Start: 2024-12-24

## 2024-12-24 NOTE — TELEPHONE ENCOUNTER
Pre visit planning completed.      Procedure details:    Patient scheduled for Colonoscopy on 1/10/25.     Arrival time: 0745. Procedure time 0830    Facility location: [facility]; [address] [place] [address]. Check in location: Main entrance, door #1 on the North side of the building under roundabout awning. DO NOT GO TO SURGERY/ED ENTRANCE.     Sedation type: Conscious sedation  - As a note: last 2 colons with MAC (21, 24), had conscious sedation with 2009 colon    Pre op exam needed? No. Patient does have one scheduled - patient should keep for clearance due to rescheduled stress echo (will not be done prior to procedure)    Indication for procedure: screening, history of polyps       Chart review:     Electronic implanted devices? No    Recent diagnosis of diverticulitis within the last 6 weeks? No      Medication review:    Diabetic? Yes. Oral diabetic medications: Glipizide (glucotrol): HOLD day of procedure.  Metformin (glucophage): HOLD day of procedure.  Insulin: Consult with managing provider.     Anticoagulants? No    Weight loss medication/injectable? No GLP-1 medication per patient's medication list. Nursing to verify with pre-assessment call.    Other medication HOLDING recommendations:  Ferrous sulfate (iron supplements): HOLD 7 days before procedure.      Prep for procedure:     Bowel prep recommendation: Standard Golytely. Bowel prep sent to [id number] - [facility] [address]      Due to: CKD noted and diabetes (patient did standard Miralax 8/2024 with good results, ok for standard Golytely vs ext. Golytely)    Procedure information and instructions sent via [employer]         Lisa Chatman RN  Endoscopy Procedure Pre Assessment   [phone] option 2

## 2024-12-26 ENCOUNTER — TELEPHONE (OUTPATIENT)
Dept: FAMILY MEDICINE | Facility: CLINIC | Age: 64
End: 2024-12-26
Payer: MEDICARE

## 2024-12-26 NOTE — TELEPHONE ENCOUNTER
Pre assessment completed for upcoming procedure.   (Please see previous telephone encounter notes for complete details)    .       Procedure details:    Arrival time and facility location reviewed.    Pre op exam needed? No.    Designated  policy reviewed. Instructed to have someone stay 6  hours post procedure.       Medication review:    Medications reviewed. Please see supporting documentation below. Holding recommendations discussed (if applicable).   Oral diabetic medication(s): Glipizide (glucotrol): HOLD day of procedure.  Metformin (glucophage): HOLD day of procedure..  Insulin. Consult with your managing provider.   Ferrous Sulfate (iron supplement): HOLD 7 days before procedure.      Prep for procedure:     Procedure prep instructions reviewed.        Any additional information needed:  Patient no longer taking iron      Patient verbalized understanding and had no questions or concerns at this time.      Wandy Yanez LPN  Endoscopy Procedure Pre Assessment   [phone] option 2

## 2024-12-26 NOTE — TELEPHONE ENCOUNTER
Patient Quality Outreach    Patient is due for the following:   Diabetes -  Eye Exam    Action(s) Taken:   No follow up needed at this time.    Type of outreach:    Chart review performed, no outreach needed. and [facility] 12-    Questions for provider review:    None           Honey Pedraza MA

## 2025-01-04 ASSESSMENT — ANXIETY QUESTIONNAIRES
GAD7 TOTAL SCORE: 6
IF YOU CHECKED OFF ANY PROBLEMS ON THIS QUESTIONNAIRE, HOW DIFFICULT HAVE THESE PROBLEMS MADE IT FOR YOU TO DO YOUR WORK, TAKE CARE OF THINGS AT HOME, OR GET ALONG WITH OTHER PEOPLE: SOMEWHAT DIFFICULT
7. FEELING AFRAID AS IF SOMETHING AWFUL MIGHT HAPPEN: NOT AT ALL
GAD7 TOTAL SCORE: 6
6. BECOMING EASILY ANNOYED OR IRRITABLE: MORE THAN HALF THE DAYS
4. TROUBLE RELAXING: NOT AT ALL
8. IF YOU CHECKED OFF ANY PROBLEMS, HOW DIFFICULT HAVE THESE MADE IT FOR YOU TO DO YOUR WORK, TAKE CARE OF THINGS AT HOME, OR GET ALONG WITH OTHER PEOPLE?: SOMEWHAT DIFFICULT
7. FEELING AFRAID AS IF SOMETHING AWFUL MIGHT HAPPEN: NOT AT ALL
2. NOT BEING ABLE TO STOP OR CONTROL WORRYING: SEVERAL DAYS
1. FEELING NERVOUS, ANXIOUS, OR ON EDGE: MORE THAN HALF THE DAYS
GAD7 TOTAL SCORE: 6
3. WORRYING TOO MUCH ABOUT DIFFERENT THINGS: SEVERAL DAYS
5. BEING SO RESTLESS THAT IT IS HARD TO SIT STILL: NOT AT ALL

## 2025-01-06 ENCOUNTER — TELEPHONE (OUTPATIENT)
Dept: FAMILY MEDICINE | Facility: CLINIC | Age: 65
End: 2025-01-06

## 2025-01-06 ENCOUNTER — OFFICE VISIT (OUTPATIENT)
Dept: FAMILY MEDICINE | Facility: CLINIC | Age: 65
End: 2025-01-06
Payer: MEDICARE

## 2025-01-06 ENCOUNTER — ANCILLARY PROCEDURE (OUTPATIENT)
Dept: GENERAL RADIOLOGY | Facility: CLINIC | Age: 65
End: 2025-01-06
Attending: PHYSICIAN ASSISTANT
Payer: MEDICARE

## 2025-01-06 ENCOUNTER — TELEPHONE (OUTPATIENT)
Dept: GASTROENTEROLOGY | Facility: CLINIC | Age: 65
End: 2025-01-06

## 2025-01-06 VITALS
SYSTOLIC BLOOD PRESSURE: 144 MMHG | OXYGEN SATURATION: 95 % | HEART RATE: 85 BPM | TEMPERATURE: 98 F | WEIGHT: 238 LBS | BODY MASS INDEX: 39.65 KG/M2 | DIASTOLIC BLOOD PRESSURE: 75 MMHG | HEIGHT: 65 IN | RESPIRATION RATE: 14 BRPM

## 2025-01-06 DIAGNOSIS — M25.512 CHRONIC LEFT SHOULDER PAIN: ICD-10-CM

## 2025-01-06 DIAGNOSIS — N18.30 STAGE 3 CHRONIC KIDNEY DISEASE, UNSPECIFIED WHETHER STAGE 3A OR 3B CKD (H): ICD-10-CM

## 2025-01-06 DIAGNOSIS — E66.812 CLASS 2 SEVERE OBESITY DUE TO EXCESS CALORIES WITH SERIOUS COMORBIDITY AND BODY MASS INDEX (BMI) OF 39.0 TO 39.9 IN ADULT (H): ICD-10-CM

## 2025-01-06 DIAGNOSIS — E11.40 TYPE 2 DIABETES MELLITUS WITH DIABETIC NEUROPATHY, WITH LONG-TERM CURRENT USE OF INSULIN (H): ICD-10-CM

## 2025-01-06 DIAGNOSIS — Z12.11 SCREEN FOR COLON CANCER: ICD-10-CM

## 2025-01-06 DIAGNOSIS — N40.1 BENIGN PROSTATIC HYPERPLASIA WITH LOWER URINARY TRACT SYMPTOMS, SYMPTOM DETAILS UNSPECIFIED: ICD-10-CM

## 2025-01-06 DIAGNOSIS — G47.33 OSA (OBSTRUCTIVE SLEEP APNEA): ICD-10-CM

## 2025-01-06 DIAGNOSIS — F33.1 MODERATE EPISODE OF RECURRENT MAJOR DEPRESSIVE DISORDER (H): ICD-10-CM

## 2025-01-06 DIAGNOSIS — R19.7 DIARRHEA, UNSPECIFIED TYPE: ICD-10-CM

## 2025-01-06 DIAGNOSIS — Z79.4 TYPE 2 DIABETES MELLITUS WITH DIABETIC NEUROPATHY, WITH LONG-TERM CURRENT USE OF INSULIN (H): ICD-10-CM

## 2025-01-06 DIAGNOSIS — R06.02 SHORTNESS OF BREATH: ICD-10-CM

## 2025-01-06 DIAGNOSIS — G89.29 CHRONIC LEFT SHOULDER PAIN: ICD-10-CM

## 2025-01-06 DIAGNOSIS — Z01.818 PREOP GENERAL PHYSICAL EXAM: Primary | ICD-10-CM

## 2025-01-06 DIAGNOSIS — E66.01 CLASS 2 SEVERE OBESITY DUE TO EXCESS CALORIES WITH SERIOUS COMORBIDITY AND BODY MASS INDEX (BMI) OF 39.0 TO 39.9 IN ADULT (H): ICD-10-CM

## 2025-01-06 DIAGNOSIS — E87.1 CHRONIC HYPONATREMIA: ICD-10-CM

## 2025-01-06 DIAGNOSIS — K21.9 GASTROESOPHAGEAL REFLUX DISEASE WITHOUT ESOPHAGITIS: ICD-10-CM

## 2025-01-06 DIAGNOSIS — I73.9 PVD (PERIPHERAL VASCULAR DISEASE): ICD-10-CM

## 2025-01-06 DIAGNOSIS — R07.9 CHEST PAIN, UNSPECIFIED TYPE: ICD-10-CM

## 2025-01-06 DIAGNOSIS — I10 ESSENTIAL HYPERTENSION WITH GOAL BLOOD PRESSURE LESS THAN 140/90: ICD-10-CM

## 2025-01-06 DIAGNOSIS — E22.2 SYNDROME OF INAPPROPRIATE SECRETION OF ANTIDIURETIC HORMONE: ICD-10-CM

## 2025-01-06 LAB
ANION GAP SERPL CALCULATED.3IONS-SCNC: 8 MMOL/L (ref 7–15)
BUN SERPL-MCNC: 30.8 MG/DL (ref 8–23)
CALCIUM SERPL-MCNC: 9.7 MG/DL (ref 8.8–10.4)
CHLORIDE SERPL-SCNC: 98 MMOL/L (ref 98–107)
CREAT SERPL-MCNC: 1.88 MG/DL (ref 0.67–1.17)
EGFRCR SERPLBLD CKD-EPI 2021: 39 ML/MIN/1.73M2
ERYTHROCYTE [DISTWIDTH] IN BLOOD BY AUTOMATED COUNT: 13.1 % (ref 10–15)
EST. AVERAGE GLUCOSE BLD GHB EST-MCNC: 160 MG/DL
GLUCOSE SERPL-MCNC: 343 MG/DL (ref 70–99)
HBA1C MFR BLD: 7.2 % (ref 0–5.6)
HCO3 SERPL-SCNC: 23 MMOL/L (ref 22–29)
HCT VFR BLD AUTO: 29.4 % (ref 40–53)
HGB BLD-MCNC: 10.2 G/DL (ref 13.3–17.7)
MCH RBC QN AUTO: 29.7 PG (ref 26.5–33)
MCHC RBC AUTO-ENTMCNC: 34.7 G/DL (ref 31.5–36.5)
MCV RBC AUTO: 86 FL (ref 78–100)
PLATELET # BLD AUTO: 273 10E3/UL (ref 150–450)
POTASSIUM SERPL-SCNC: 5.9 MMOL/L (ref 3.4–5.3)
RBC # BLD AUTO: 3.44 10E6/UL (ref 4.4–5.9)
SODIUM SERPL-SCNC: 129 MMOL/L (ref 135–145)
WBC # BLD AUTO: 6.3 10E3/UL (ref 4–11)

## 2025-01-06 PROCEDURE — 80048 BASIC METABOLIC PNL TOTAL CA: CPT | Performed by: PHYSICIAN ASSISTANT

## 2025-01-06 PROCEDURE — 99417 PROLNG OP E/M EACH 15 MIN: CPT | Performed by: PHYSICIAN ASSISTANT

## 2025-01-06 PROCEDURE — 73030 X-RAY EXAM OF SHOULDER: CPT | Mod: TC | Performed by: RADIOLOGY

## 2025-01-06 PROCEDURE — 36415 COLL VENOUS BLD VENIPUNCTURE: CPT | Performed by: PHYSICIAN ASSISTANT

## 2025-01-06 PROCEDURE — 85027 COMPLETE CBC AUTOMATED: CPT | Performed by: PHYSICIAN ASSISTANT

## 2025-01-06 PROCEDURE — 99215 OFFICE O/P EST HI 40 MIN: CPT | Performed by: PHYSICIAN ASSISTANT

## 2025-01-06 PROCEDURE — 83036 HEMOGLOBIN GLYCOSYLATED A1C: CPT | Performed by: PHYSICIAN ASSISTANT

## 2025-01-06 PROCEDURE — G2211 COMPLEX E/M VISIT ADD ON: HCPCS | Performed by: PHYSICIAN ASSISTANT

## 2025-01-06 RX ORDER — SODIUM BICARBONATE 325 MG/1
325 TABLET ORAL 2 TIMES DAILY
Status: ON HOLD | COMMUNITY
Start: 2024-10-01

## 2025-01-06 RX ORDER — FINASTERIDE 5 MG/1
1 TABLET, FILM COATED ORAL DAILY
Qty: 90 TABLET | Refills: 1 | Status: ON HOLD | OUTPATIENT
Start: 2025-01-06

## 2025-01-06 ASSESSMENT — PATIENT HEALTH QUESTIONNAIRE - PHQ9: SUM OF ALL RESPONSES TO PHQ QUESTIONS 1-9: 10

## 2025-01-06 NOTE — TELEPHONE ENCOUNTER
RN placed call to cardiac scheduling   Soonest openings are in [facility] on 1/9 @ 2:00 or 1/14 @ 2:00    RN placed call to patient to discuss openings on 1/9 and 1/14 in [facility]   Patient would like 1/9/2025 @ 2:00  RN advised will call to assist with changing appt and return call back to advise on details     RN returned call to change patient appt and was transferred multiple times to incorrect locations and unable to schedule this took approximately 20 minutes     RN spoke to Carmelita who changed patient's appt from 1/20/25 @ [place] to 1/9/2025 at  check in time 1:45    RN spoke to patient   Discussed appointment details - appt information sent via my chart per patient request RN verified pt able to get into my chart to read instructions     Marcela Blackburn, Registered Nurse  [facility]

## 2025-01-06 NOTE — TELEPHONE ENCOUNTER
Stefany Chan, PAC    FYI   RN rescheduled for 1/9/2025     Marcela Blackburn, Registered Nurse  [facility]

## 2025-01-06 NOTE — PATIENT INSTRUCTIONS
How to Take Your Medication Before Surgery  Preoperative Medication Instructions   Antiplatelet or Anticoagulation Medication Instructions   - Patient is on no antiplatelet or anticoagulation medications.    Additional Medication Instructions  - Take losartan as prescribed.   - Beta Blockers (atenolol, metoprolol, propranolol) : Continue taking on the day of surgery.   - Calcium Channel Blockers (amlodipine, diltiazem, felodipine): May be continued on the day of surgery.   - Statins (atorvastatin, simvastatin, pravastatin) : Continue taking on the day of surgery.    - Long acting insulin (e.g. glargine, detemir): Take 80% of the usual evening or morning dose before surgery.     - metformin: DO NOT TAKE day of surgery.   - sulfonylurea (e.g. glyburide, glipizide): DO NOT TAKE day of surgery   - SSRIs, SNRIs, TCAs, Antipsychotics: Continue without modification.   Take Levoxyl as prescribed       Patient Education   Preparing for Your Surgery  For Adults  Getting started  In most cases, a nurse will call to review your health history and instructions. They will give you an arrival time based on your scheduled surgery time. Please be ready to share:  Your doctor's clinic name and phone number  Your medical, surgical, and anesthesia history  A list of allergies and sensitivities  A list of medicines, including herbal treatments and over-the-counter drugs  Whether the patient has a legal guardian (ask how to send us the papers in advance)  Note: You may not receive a call if you were seen at our PAC (Preoperative Assessment Center).  Please tell us if you're pregnant--or if there's any chance you might be pregnant. Some surgeries may injure a fetus (unborn baby), so they require a pregnancy test. Surgeries that are safe for a fetus don't always need a test, and you can choose whether to have one.   Preparing for surgery  Within 10 to 30 days of surgery: Have a pre-op exam (sometimes called an H&P, or History and  Physical). This can be done at a clinic or pre-operative center.  If you're having a , you may not need this exam. Talk to your care team.  At your pre-op exam, talk to your care team about all medicines you take. (This includes CBD oil and any drugs, such as THC, marijuana, and other forms of cannabis.) If you need to stop any medicine before surgery, ask when to start taking it again.  This is for your safety. Many medicines and drugs can make you bleed too much during surgery. Some change how well surgery (anesthesia) drugs work.  Call your insurance company to let them know you're having surgery. (If you don't have insurance, call [phone].)  Call your clinic if there's any change in your health. This includes a scrape or scratch near the surgery site, or any signs of a cold (sore throat, runny nose, cough, rash, fever).  Eating and drinking guidelines  For your safety: Unless your surgeon tells you otherwise, follow the guidelines below.  Eat and drink as normal until 8 hours before you arrive for surgery. After that, no food or milk. You can spit out gum when you arrive.  Drink clear liquids until 2 hours before you arrive. These are liquids you can see through, like water, Gatorade, and Propel Water. They also include plain black coffee and tea (no cream or milk).  No alcohol for 24 hours before you arrive. The night before surgery, stop any drinks that contain THC.  If your care team tells you to take medicine on the morning of surgery, it's okay to take it with a sip of water. No other medicines or drugs are allowed (including CBD oil)--follow your care team's instructions.  If you have questions the day of surgery, call your hospital or surgery center.   Preventing infection  Shower or bathe the night before and the morning of surgery. Follow the instructions your clinic gave you. (If no instructions, use regular soap.)  Don't shave or clip hair near your surgery site. We'll remove the hair if  needed.  Don't smoke or vape the morning of surgery. No chewing tobacco for 6 hours before you arrive. A nicotine patch is okay. You may spit out nicotine gum when you arrive.  For some surgeries, the surgeon will tell you to fully quit smoking and nicotine.  We will make every effort to keep you safe from infection. We will:  Clean our hands often with soap and water (or an alcohol-based hand rub).  Clean the skin at your surgery site with a special soap that kills germs.  Give you a special gown to keep you warm. (Cold raises the risk of infection.)  Wear hair covers, masks, gowns, and gloves during surgery.  Give antibiotic medicine, if prescribed. Not all surgeries need this medicine.  What to bring on the day of surgery  Photo ID and insurance card  Copy of your health care directive, if you have one  Glasses and hearing aids (bring cases)  You can't wear contacts during surgery  Inhaler and eye drops, if you use them (tell us about these when you arrive)  CPAP machine or breathing device, if you use them  A few personal items, if spending the night  If you have . . .  A pacemaker, ICD (cardiac defibrillator), or other implant: Bring the ID card.  An implanted stimulator: Bring the remote control.  A legal guardian: Bring a copy of the certified (court-stamped) guardianship papers.  Please remove any jewelry, including body piercings. Leave jewelry and other valuables at home.  If you're going home the day of surgery  You must have a responsible adult drive you home. They should stay with you overnight as well.  If you don't have someone to stay with you, and you aren't safe to go home alone, we may keep you overnight. Insurance often won't pay for this.  After surgery  If it's hard to control your pain or you need more pain medicine, please call your surgeon's office.  Questions?   If you have any questions for your care team, list them here:    ____________________________________________________________________________________________________________________________________________________________________________________________________________________________________________________________  For informational purposes only. Not to replace the advice of your health care provider. Copyright   2003, 2019 [facility]. All rights reserved. Clinically reviewed by Elmer Quintana MD. [employer] 188203 - REV 08/24.

## 2025-01-06 NOTE — TELEPHONE ENCOUNTER
Caller: Caio    Reason for Reschedule/Cancellation   (please be detailed, any staff messages or encounters to note?): Pt needed stress test done before procedure. New order adding EGD as well as a Colonoscopy. Pt also requested to have MAC sedation.      Prior to reschedule please review:  Ordering Provider: ROSE MARY SPANN   Sedation Determined: moderate  Does patient have any ASC Exclusions, please identify?: no      Notes on Cancelled Procedure:  Procedure: Lower Endoscopy [Colonoscopy]   Date: 1/10/25  Location: [facility]; [address] [place] [address]  Surgeon: ANTIONE      Rescheduled: Yes,   Procedure: Upper and Lower Endoscopy [EGD and Colonoscopy]    Date: 5/19/25   Location: [facility]; [address] [place] [address]    Surgeon: ROBERTO   Sedation Level Scheduled  MAC ,  Reason for Sedation Level Per pt request   Instructions updated and sent: y, mycmegant     Does patient need PAC or Pre -Op Rescheduled? : yes, Pt informed pre-op needed       Did you cancel or rescheduled an EUS procedure? No.

## 2025-01-06 NOTE — TELEPHONE ENCOUNTER
Patient here today for pre-op. He continues to have shortness of breath and chest pain that I am worried could be heart related. For now we are postponing currently scheduled colonoscopy (he will call to cancel and reschedule), however is there any way to move up his stress testing (Mondays or Fridays are best for patient)?
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
## 2025-01-07 ENCOUNTER — TELEPHONE (OUTPATIENT)
Dept: FAMILY MEDICINE | Facility: CLINIC | Age: 65
End: 2025-01-07

## 2025-01-07 ENCOUNTER — TELEPHONE (OUTPATIENT)
Dept: ENDOCRINOLOGY | Facility: CLINIC | Age: 65
End: 2025-01-07

## 2025-01-07 NOTE — TELEPHONE ENCOUNTER
Called pt and relayed provider message below. Patient was given an opportunity to ask questions, verbalized understanding of plan, and is agreeable.    Pt informs he is on fluid restrictions. Per his kidney doctor, he is not to exceed fluid intake of 50 ounces daily. Pt reports he is drinking 6 glasses daily which is about 48 ounces. He wonders how much he should increase fluid intake in upcoming days (1 or 2 glasses more?) He is requesting more clarification from provider.    Scheduled pt for lab only for 1/13/25 to recheck BMP.     Also, reviewed chart and pt has yet to establish care with new PCP. Scheduled pt to establish care with [facility] for 2/14/25. Also scheduled pt for pre-op exam on 5/9/25 with [facility] as pt has scheduled colonoscopy for 5/19/25    Routing to provider to advise on fluid increase.    Amee GARCIA RN          ----- Message from Stefany Chan sent at 1/7/2025  8:21 AM CST -----  Please call patient to let him know about test results.  Sodium is low (I think this at least partially due to the high glucose 343)- though patient often runs low with his sodium.   Potassium is high and creatinine is elevated. This could be due to dehydration causing some decreased kidney function. I would like him to try increasing fluids just slightly in the coming days and recheck labs early next week.  Hemoglobin is stable.  A1c is improved compared to previous.

## 2025-01-07 NOTE — TELEPHONE ENCOUNTER
Can he increase up to the 50 ounces? This provides just a slight increase without going above his recommended.

## 2025-01-07 NOTE — TELEPHONE ENCOUNTER
Called pt and relayed provider message below. Patient was given an opportunity to ask questions, verbalized understanding of plan, and is agreeable.    Amee GARCIA RN

## 2025-01-07 NOTE — TELEPHONE ENCOUNTER
Called and spoke with patient.  Patient was informed that her message will be sent to the Doctor on tomorrow.  None emergency message was on hold until he comes back to the office, 11/12/19.  Patient was ok with this.        ----- Message from Colin Briseno sent at 11/11/2019  1:52 PM CST -----  Contact: pt @ [phone]  Pt is asking to speak w/ the doctor regarding MRI completed recently. Pt also wants to discuss a trial medication.     [facility]    Phone Message    May a detailed message be left on voicemail: yes     Reason for Call: Other: Patient is having stress test done on Thursday and wondering if he should hold back on any insulin advise.     Action Taken: Message routed to:  [facility] endo    Travel Screening: Not Applicable     Date of Service:

## 2025-01-08 ENCOUNTER — PATIENT OUTREACH (OUTPATIENT)
Dept: CARE COORDINATION | Facility: CLINIC | Age: 65
End: 2025-01-08
Payer: MEDICARE

## 2025-01-08 NOTE — TELEPHONE ENCOUNTER
I called LM on the pt's personal VM that changes do not need to be made if he is continuing to eat. Pt is advised to call with further questions.

## 2025-01-09 ENCOUNTER — HOSPITAL ENCOUNTER (OUTPATIENT)
Dept: CARDIOLOGY | Facility: CLINIC | Age: 65
Discharge: HOME | End: 2025-01-09
Attending: INTERNAL MEDICINE
Payer: MEDICARE

## 2025-01-09 ENCOUNTER — HOSPITAL ENCOUNTER (INPATIENT)
Facility: CLINIC | Age: 65
DRG: 281 | End: 2025-01-09
Attending: EMERGENCY MEDICINE | Admitting: STUDENT IN AN ORGANIZED HEALTH CARE EDUCATION/TRAINING PROGRAM
Payer: MEDICARE

## 2025-01-09 ENCOUNTER — APPOINTMENT (OUTPATIENT)
Dept: GENERAL RADIOLOGY | Facility: CLINIC | Age: 65
DRG: 281 | End: 2025-01-09
Attending: EMERGENCY MEDICINE
Payer: MEDICARE

## 2025-01-09 VITALS
BODY MASS INDEX: 38.95 KG/M2 | WEIGHT: 233.8 LBS | OXYGEN SATURATION: 97 % | RESPIRATION RATE: 20 BRPM | SYSTOLIC BLOOD PRESSURE: 129 MMHG | DIASTOLIC BLOOD PRESSURE: 59 MMHG | HEART RATE: 59 BPM | TEMPERATURE: 98.2 F | HEIGHT: 65 IN

## 2025-01-09 DIAGNOSIS — I21.4 NSTEMI (NON-ST ELEVATED MYOCARDIAL INFARCTION) (H): Primary | ICD-10-CM

## 2025-01-09 DIAGNOSIS — I20.0 UNSTABLE ANGINA (H): ICD-10-CM

## 2025-01-09 DIAGNOSIS — R94.39 ABNORMAL STRESS TEST: ICD-10-CM

## 2025-01-09 DIAGNOSIS — R06.09 DOE (DYSPNEA ON EXERTION): ICD-10-CM

## 2025-01-09 LAB
ANION GAP SERPL CALCULATED.3IONS-SCNC: 11 MMOL/L (ref 7–15)
ATRIAL RATE - MUSE: 63 BPM
BASOPHILS # BLD AUTO: 0 10E3/UL (ref 0–0.2)
BASOPHILS NFR BLD AUTO: 1 %
BUN SERPL-MCNC: 23.7 MG/DL (ref 8–23)
CALCIUM SERPL-MCNC: 9.7 MG/DL (ref 8.8–10.4)
CHLORIDE SERPL-SCNC: 102 MMOL/L (ref 98–107)
CREAT SERPL-MCNC: 1.7 MG/DL (ref 0.67–1.17)
DIASTOLIC BLOOD PRESSURE - MUSE: NORMAL MMHG
EGFRCR SERPLBLD CKD-EPI 2021: 44 ML/MIN/1.73M2
EOSINOPHIL # BLD AUTO: 0.1 10E3/UL (ref 0–0.7)
EOSINOPHIL NFR BLD AUTO: 2 %
ERYTHROCYTE [DISTWIDTH] IN BLOOD BY AUTOMATED COUNT: 13.3 % (ref 10–15)
GLUCOSE BLDC GLUCOMTR-MCNC: 183 MG/DL (ref 70–99)
GLUCOSE SERPL-MCNC: 165 MG/DL (ref 70–99)
HCO3 SERPL-SCNC: 22 MMOL/L (ref 22–29)
HCT VFR BLD AUTO: 29.2 % (ref 40–53)
HGB BLD-MCNC: 9.8 G/DL (ref 13.3–17.7)
IMM GRANULOCYTES # BLD: 0 10E3/UL
IMM GRANULOCYTES NFR BLD: 0 %
INTERPRETATION ECG - MUSE: NORMAL
LYMPHOCYTES # BLD AUTO: 1.6 10E3/UL (ref 0.8–5.3)
LYMPHOCYTES NFR BLD AUTO: 29 %
MCH RBC QN AUTO: 28.7 PG (ref 26.5–33)
MCHC RBC AUTO-ENTMCNC: 33.6 G/DL (ref 31.5–36.5)
MCV RBC AUTO: 86 FL (ref 78–100)
MONOCYTES # BLD AUTO: 0.8 10E3/UL (ref 0–1.3)
MONOCYTES NFR BLD AUTO: 14 %
NEUTROPHILS # BLD AUTO: 2.9 10E3/UL (ref 1.6–8.3)
NEUTROPHILS NFR BLD AUTO: 54 %
NRBC # BLD AUTO: 0 10E3/UL
NRBC BLD AUTO-RTO: 0 /100
P AXIS - MUSE: 45 DEGREES
PLATELET # BLD AUTO: 278 10E3/UL (ref 150–450)
POTASSIUM SERPL-SCNC: 5.1 MMOL/L (ref 3.4–5.3)
PR INTERVAL - MUSE: 168 MS
QRS DURATION - MUSE: 78 MS
QT - MUSE: 394 MS
QTC - MUSE: 403 MS
R AXIS - MUSE: -19 DEGREES
RBC # BLD AUTO: 3.41 10E6/UL (ref 4.4–5.9)
SODIUM SERPL-SCNC: 135 MMOL/L (ref 135–145)
SYSTOLIC BLOOD PRESSURE - MUSE: NORMAL MMHG
T AXIS - MUSE: 17 DEGREES
TROPONIN T SERPL HS-MCNC: 78 NG/L
TROPONIN T SERPL HS-MCNC: 86 NG/L
UFH PPP CHRO-ACNC: 0.42 IU/ML
VENTRICULAR RATE- MUSE: 63 BPM
WBC # BLD AUTO: 5.4 10E3/UL (ref 4–11)

## 2025-01-09 PROCEDURE — 250N000013 HC RX MED GY IP 250 OP 250 PS 637: Performed by: STUDENT IN AN ORGANIZED HEALTH CARE EDUCATION/TRAINING PROGRAM

## 2025-01-09 PROCEDURE — C8928 TTE W OR W/O FOL W/CON,STRES: HCPCS

## 2025-01-09 PROCEDURE — 999N000157 HC STATISTIC RCP TIME EA 10 MIN

## 2025-01-09 PROCEDURE — 85018 HEMOGLOBIN: CPT | Performed by: EMERGENCY MEDICINE

## 2025-01-09 PROCEDURE — 120N000001 HC R&B MED SURG/OB

## 2025-01-09 PROCEDURE — 80048 BASIC METABOLIC PNL TOTAL CA: CPT | Performed by: EMERGENCY MEDICINE

## 2025-01-09 PROCEDURE — 36415 COLL VENOUS BLD VENIPUNCTURE: CPT | Performed by: EMERGENCY MEDICINE

## 2025-01-09 PROCEDURE — 250N000012 HC RX MED GY IP 250 OP 636 PS 637: Performed by: STUDENT IN AN ORGANIZED HEALTH CARE EDUCATION/TRAINING PROGRAM

## 2025-01-09 PROCEDURE — 96365 THER/PROPH/DIAG IV INF INIT: CPT

## 2025-01-09 PROCEDURE — 250N000013 HC RX MED GY IP 250 OP 250 PS 637: Performed by: INTERNAL MEDICINE

## 2025-01-09 PROCEDURE — 93325 DOPPLER ECHO COLOR FLOW MAPG: CPT | Mod: TC

## 2025-01-09 PROCEDURE — 255N000002 HC RX 255 OP 636: Performed by: INTERNAL MEDICINE

## 2025-01-09 PROCEDURE — 250N000011 HC RX IP 250 OP 636: Performed by: EMERGENCY MEDICINE

## 2025-01-09 PROCEDURE — 250N000011 HC RX IP 250 OP 636: Performed by: INTERNAL MEDICINE

## 2025-01-09 PROCEDURE — 36415 COLL VENOUS BLD VENIPUNCTURE: CPT | Performed by: STUDENT IN AN ORGANIZED HEALTH CARE EDUCATION/TRAINING PROGRAM

## 2025-01-09 PROCEDURE — 84484 ASSAY OF TROPONIN QUANT: CPT | Performed by: EMERGENCY MEDICINE

## 2025-01-09 PROCEDURE — 71046 X-RAY EXAM CHEST 2 VIEWS: CPT

## 2025-01-09 PROCEDURE — 99222 1ST HOSP IP/OBS MODERATE 55: CPT | Mod: AI | Performed by: STUDENT IN AN ORGANIZED HEALTH CARE EDUCATION/TRAINING PROGRAM

## 2025-01-09 PROCEDURE — 250N000009 HC RX 250: Performed by: INTERNAL MEDICINE

## 2025-01-09 PROCEDURE — 85520 HEPARIN ASSAY: CPT | Performed by: STUDENT IN AN ORGANIZED HEALTH CARE EDUCATION/TRAINING PROGRAM

## 2025-01-09 PROCEDURE — 250N000013 HC RX MED GY IP 250 OP 250 PS 637: Performed by: EMERGENCY MEDICINE

## 2025-01-09 PROCEDURE — 94660 CPAP INITIATION&MGMT: CPT

## 2025-01-09 PROCEDURE — 99285 EMERGENCY DEPT VISIT HI MDM: CPT

## 2025-01-09 PROCEDURE — 85004 AUTOMATED DIFF WBC COUNT: CPT | Performed by: EMERGENCY MEDICINE

## 2025-01-09 PROCEDURE — 96376 TX/PRO/DX INJ SAME DRUG ADON: CPT

## 2025-01-09 RX ORDER — HYDRALAZINE HYDROCHLORIDE 10 MG/1
10 TABLET, FILM COATED ORAL 4 TIMES DAILY PRN
Status: DISCONTINUED | OUTPATIENT
Start: 2025-01-09 | End: 2025-01-10 | Stop reason: HOSPADM

## 2025-01-09 RX ORDER — ONDANSETRON 4 MG/1
4 TABLET, ORALLY DISINTEGRATING ORAL EVERY 6 HOURS PRN
Status: DISCONTINUED | OUTPATIENT
Start: 2025-01-09 | End: 2025-01-10 | Stop reason: HOSPADM

## 2025-01-09 RX ORDER — ACETAMINOPHEN 325 MG/1
650 TABLET ORAL EVERY 4 HOURS PRN
Status: DISCONTINUED | OUTPATIENT
Start: 2025-01-09 | End: 2025-01-10 | Stop reason: HOSPADM

## 2025-01-09 RX ORDER — AMLODIPINE BESYLATE 5 MG/1
5 TABLET ORAL DAILY
Status: DISCONTINUED | OUTPATIENT
Start: 2025-01-10 | End: 2025-01-09

## 2025-01-09 RX ORDER — PROCHLORPERAZINE MALEATE 5 MG/1
5 TABLET ORAL EVERY 6 HOURS PRN
Status: DISCONTINUED | OUTPATIENT
Start: 2025-01-09 | End: 2025-01-10 | Stop reason: HOSPADM

## 2025-01-09 RX ORDER — VILAZODONE HYDROCHLORIDE 20 MG/1
20 TABLET ORAL AT BEDTIME
Status: DISCONTINUED | OUTPATIENT
Start: 2025-01-09 | End: 2025-01-10 | Stop reason: HOSPADM

## 2025-01-09 RX ORDER — ASPIRIN 325 MG
325 TABLET ORAL ONCE
Status: COMPLETED | OUTPATIENT
Start: 2025-01-09 | End: 2025-01-09

## 2025-01-09 RX ORDER — ATROPINE SULFATE 0.4 MG/ML
.2-.4 AMPUL (ML) INJECTION
Status: ACTIVE | OUTPATIENT
Start: 2025-01-09

## 2025-01-09 RX ORDER — DOBUTAMINE HYDROCHLORIDE 200 MG/100ML
INJECTION INTRAVENOUS
Status: COMPLETED
Start: 2025-01-09 | End: 2025-01-09

## 2025-01-09 RX ORDER — ATENOLOL 25 MG/1
50 TABLET ORAL DAILY
Status: DISCONTINUED | OUTPATIENT
Start: 2025-01-09 | End: 2025-01-10

## 2025-01-09 RX ORDER — ASPIRIN 81 MG/1
81 TABLET, CHEWABLE ORAL DAILY
Status: DISCONTINUED | OUTPATIENT
Start: 2025-01-10 | End: 2025-01-10 | Stop reason: HOSPADM

## 2025-01-09 RX ORDER — HEPARIN SODIUM 10000 [USP'U]/100ML
0-5000 INJECTION, SOLUTION INTRAVENOUS CONTINUOUS
Status: DISCONTINUED | OUTPATIENT
Start: 2025-01-09 | End: 2025-01-10

## 2025-01-09 RX ORDER — LATANOPROST 50 UG/ML
1 SOLUTION/ DROPS OPHTHALMIC AT BEDTIME
Status: DISCONTINUED | OUTPATIENT
Start: 2025-01-09 | End: 2025-01-10 | Stop reason: HOSPADM

## 2025-01-09 RX ORDER — MIRTAZAPINE 15 MG/1
45 TABLET, FILM COATED ORAL AT BEDTIME
Status: DISCONTINUED | OUTPATIENT
Start: 2025-01-09 | End: 2025-01-10 | Stop reason: HOSPADM

## 2025-01-09 RX ORDER — SODIUM BICARBONATE TAB 650 MG 650 MG
325 TAB ORAL 2 TIMES DAILY
Status: DISCONTINUED | OUTPATIENT
Start: 2025-01-09 | End: 2025-01-10 | Stop reason: HOSPADM

## 2025-01-09 RX ORDER — ONDANSETRON 2 MG/ML
4 INJECTION INTRAMUSCULAR; INTRAVENOUS EVERY 6 HOURS PRN
Status: DISCONTINUED | OUTPATIENT
Start: 2025-01-09 | End: 2025-01-10 | Stop reason: HOSPADM

## 2025-01-09 RX ORDER — ACETAMINOPHEN 650 MG/1
650 SUPPOSITORY RECTAL EVERY 4 HOURS PRN
Status: DISCONTINUED | OUTPATIENT
Start: 2025-01-09 | End: 2025-01-10 | Stop reason: HOSPADM

## 2025-01-09 RX ORDER — METOPROLOL TARTRATE 1 MG/ML
1-5 INJECTION, SOLUTION INTRAVENOUS
Status: ACTIVE | OUTPATIENT
Start: 2025-01-09 | End: 2025-01-09

## 2025-01-09 RX ORDER — AMLODIPINE BESYLATE 5 MG/1
5 TABLET ORAL DAILY
Status: DISCONTINUED | OUTPATIENT
Start: 2025-01-09 | End: 2025-01-10 | Stop reason: HOSPADM

## 2025-01-09 RX ORDER — METOPROLOL TARTRATE 1 MG/ML
INJECTION, SOLUTION INTRAVENOUS
Status: COMPLETED
Start: 2025-01-09 | End: 2025-01-09

## 2025-01-09 RX ORDER — DEXTROSE MONOHYDRATE 25 G/50ML
25-50 INJECTION, SOLUTION INTRAVENOUS
Status: DISCONTINUED | OUTPATIENT
Start: 2025-01-09 | End: 2025-01-10 | Stop reason: HOSPADM

## 2025-01-09 RX ORDER — AMOXICILLIN 250 MG
2 CAPSULE ORAL 2 TIMES DAILY PRN
Status: DISCONTINUED | OUTPATIENT
Start: 2025-01-09 | End: 2025-01-10 | Stop reason: HOSPADM

## 2025-01-09 RX ORDER — NICOTINE POLACRILEX 4 MG
15-30 LOZENGE BUCCAL
Status: DISCONTINUED | OUTPATIENT
Start: 2025-01-09 | End: 2025-01-10 | Stop reason: HOSPADM

## 2025-01-09 RX ORDER — DOBUTAMINE HYDROCHLORIDE 200 MG/100ML
10-50 INJECTION INTRAVENOUS CONTINUOUS
Status: ACTIVE | OUTPATIENT
Start: 2025-01-09 | End: 2025-01-09

## 2025-01-09 RX ORDER — AMOXICILLIN 250 MG
1 CAPSULE ORAL 2 TIMES DAILY PRN
Status: DISCONTINUED | OUTPATIENT
Start: 2025-01-09 | End: 2025-01-10 | Stop reason: HOSPADM

## 2025-01-09 RX ORDER — LIDOCAINE 40 MG/G
CREAM TOPICAL
Status: DISCONTINUED | OUTPATIENT
Start: 2025-01-09 | End: 2025-01-10 | Stop reason: HOSPADM

## 2025-01-09 RX ORDER — ATORVASTATIN CALCIUM 40 MG/1
40 TABLET, FILM COATED ORAL EVERY EVENING
Status: DISCONTINUED | OUTPATIENT
Start: 2025-01-09 | End: 2025-01-10 | Stop reason: HOSPADM

## 2025-01-09 RX ORDER — ASPIRIN 325 MG
325 TABLET ORAL ONCE
Status: DISCONTINUED | OUTPATIENT
Start: 2025-01-09 | End: 2025-01-09

## 2025-01-09 RX ADMIN — AMLODIPINE BESYLATE 5 MG: 5 TABLET ORAL at 21:27

## 2025-01-09 RX ADMIN — METOPROLOL TARTRATE 5 MG: 5 INJECTION INTRAVENOUS at 14:49

## 2025-01-09 RX ADMIN — ATORVASTATIN CALCIUM 40 MG: 40 TABLET, FILM COATED ORAL at 19:21

## 2025-01-09 RX ADMIN — HUMAN ALBUMIN MICROSPHERES AND PERFLUTREN 3 ML: 10; .22 INJECTION, SOLUTION INTRAVENOUS at 15:18

## 2025-01-09 RX ADMIN — METOPROLOL TARTRATE 3 MG: 1 INJECTION, SOLUTION INTRAVENOUS at 15:12

## 2025-01-09 RX ADMIN — ASPIRIN 325 MG ORAL TABLET 325 MG: 325 PILL ORAL at 16:16

## 2025-01-09 RX ADMIN — METOPROLOL TARTRATE 3 MG: 5 INJECTION INTRAVENOUS at 15:12

## 2025-01-09 RX ADMIN — INSULIN ASPART 1 UNITS: 100 INJECTION, SOLUTION INTRAVENOUS; SUBCUTANEOUS at 21:36

## 2025-01-09 RX ADMIN — HEPARIN SODIUM 1200 UNITS/HR: 10000 INJECTION, SOLUTION INTRAVENOUS at 16:22

## 2025-01-09 RX ADMIN — DOBUTAMINE HYDROCHLORIDE 10 MCG/KG/MIN: 200 INJECTION INTRAVENOUS at 14:31

## 2025-01-09 RX ADMIN — METOPROLOL TARTRATE 5 MG: 5 INJECTION, SOLUTION INTRAVENOUS at 14:41

## 2025-01-09 RX ADMIN — LEVOTHYROXINE SODIUM 137 MCG: 0.11 TABLET ORAL at 21:27

## 2025-01-09 RX ADMIN — MIRTAZAPINE 45 MG: 15 TABLET, FILM COATED ORAL at 21:26

## 2025-01-09 RX ADMIN — INSULIN GLARGINE 15 UNITS: 100 INJECTION, SOLUTION SUBCUTANEOUS at 21:35

## 2025-01-09 RX ADMIN — METOPROLOL TARTRATE 5 MG: 1 INJECTION, SOLUTION INTRAVENOUS at 14:49

## 2025-01-09 RX ADMIN — VILAZODONE HYDROCHLORIDE 20 MG: 20 TABLET ORAL at 21:26

## 2025-01-09 RX ADMIN — ATENOLOL 50 MG: 25 TABLET ORAL at 19:21

## 2025-01-09 RX ADMIN — SODIUM BICARBONATE 650 MG TABLET 325 MG: at 21:27

## 2025-01-09 RX ADMIN — LATANOPROST 1 DROP: 50 SOLUTION OPHTHALMIC at 21:24

## 2025-01-09 ASSESSMENT — ACTIVITIES OF DAILY LIVING (ADL)
ADLS_ACUITY_SCORE: 60

## 2025-01-09 NOTE — ED TRIAGE NOTES
Failed dobutamine cardiac stress test. 13mg Metoprolol IV given by cardiac nurse for BP up to 200/110. CP and SOB with exertion.

## 2025-01-09 NOTE — CONSULTS
Cardiology Consultation Complex consultation with abnormal stress echocardiogram decision making about need for coronary angiography and intervention       Steve Morgan MRN# [id number]   YOB: 1960 Age: 64 year old   Date of Admission: 1/9/2025     Reason for consult:            Assessment and Plan:     Assessment : The patient has multiple coronary risk factors and describes symptoms consistent with exertional angina.  His stress test today elicited both chest discomfort and wall motion abnormality and he has mildly elevated troponin levels.  His chronic kidney disease makes management more challenging as it may affect his risk for contrast toxicity, but I believe he is a candidate for angiography and possible revascularization.    Recommendations   1) agree with intravenous heparin  2) aspirin 325 mg now, 81 mg daily  3) switch from simvastatin to atorvastatin 40 mg daily  4) increase atenolol from 25 to 50 mg daily  5) coronary angiography with possible revascularization tomorrow.  Will require preprocedural and postprocedural hydration.    I have examined the patient, reviewed the history, medications and pre procedural tests. I have explained to the patient the risks of death, MI, stroke, hematoma, possible urgent bypass surgery for failed PCI, use of stents, thienopyridine agents, possible peripheral vascular complications, arrhythmia, the use of FFR in clinical decision-making and alternative of medical therapy alone in regards to left heart catheterization, left ventriculography, coronary angiography, and possible percutaneous coronary intervention. The patient voiced understanding and wishes to proceed. The patient has a good right radial pulse, normal ulnar pulse and a normal Ramirez's sign.          High complexity medical decision making  Chronic illness with severe exacerbation, progression:  Acute illness that poses a threat to life or bodily function:     High complexity data  LAST VISIT: 7/19/21  NEXT VISIT: 7/25/22    Per last dictation patient is on these medications. Please sign for refills if ok. Thank you. "review  Unique tests ordered:   Unique results reviewed: Chest XR from  .Echo reported   Independent interpretation of a test performed by another physician: ECG from   External documents reviewed:              Chief Complaint:   Chest Pain  Abnormal Dobutamine Stress Test         History of Present Illness:   Steve Morgan, a 64 year old male, with obesity, hypertension, type 2 diabetes mellitus, and chronic kidney disease was evaluated in consultation at the request of the [facility] emergency room staff for chest pain and an abnormal dobutamine stress echocardiogram    The patient describes a 6-month history of exercise associated dyspnea and substernal chest discomfort.  He saw my colleague Dr. Jed Butler who referred him for a dobutamine stress echocardiogram.  During the dobutamine stress echo today, the patient developed prolonged chest discomfort accompanied by anterior apical hypokinesis during the stress test.  The patient's symptoms and wall motion abnormality  resolved with rest, but because of prolonged symptoms and the findings on his stress echo, he was sent to the ED.  Subsequent high sensitivity troponin level came back at 72.  His ECG shows ST-T wave changes without evidence of significant ST segment elevation or depression.  He has been placed on intravenous heparin.    The patient denies a previous history of MI, but does have a brother who underwent bypass surgery.  He has had known chronic kidney disease.    PAST MEDICAL HISTORY  1) Chronic Kidney Disease : Cr 1.88/GFR 39  2) Morbid Obesity BMI 39  3) Essential Hypertension  4)  Type 2 Diabetes Mellitus  5)  Obstructive Sleep Apnea         Physical Exam:   Vitals were reviewed  Blood pressure (!) 172/97, pulse 66, temperature 97.2  F (36.2  C), temperature source Temporal, resp. rate 18, height 1.651 m (5' 5\"), weight 108 kg (238 lb 1.6 oz), SpO2 99%.  Temperatures:  Current - Temp: 97.2  F (36.2  C); Max - Temp  Av.2  F (36.2  C)  " Min: 97.2  F (36.2  C)  Max: 97.2  F (36.2  C)  Respiration range: Resp  Av  Min: 18  Max: 18  Pulse range: Pulse  Av  Min: 66  Max: 66  Blood pressure range: Systolic (24hrs), Av , Min:172 , Max:172   ; Diastolic (24hrs), Av, Min:97, Max:97    Pulse oximetry range: SpO2  Av %  Min: 99 %  Max: 99 %  No intake or output data in the 24 hours ending 25 1646  Constitutional:   awake, alert, cooperative, no apparent distress, and appears stated age     Eyes:   Lids and lashes normal, pupils equal, round and reactive to light, extra ocular muscles intact, sclera clear, conjunctiva normal     Neck:   supple, symmetrical, trachea midline,      Back:   symmetric     Lungs:   Clear to Percussion and Auscultatoin     Cardiovascular:   Normal S1 and S2 no S3     Abdomen:   non-tender     Musculoskeletal:   motor strength is 5 out of 5 all extremities bilaterally     Neurologic:   Grossly nonfocal     Skin:   normal skin color, texture, turgor     Additional findings:                    Past Medical History:   I have reviewed this patient's past medical history  Past Medical History:   Diagnosis Date    Cellulitis and abscess of trunk 2017    Depression     Depressive disorder     Hyperlipidemia LDL goal <100 10/31/2010    Hypertension goal BP (blood pressure) < 140/90 2011    Hypothyroidism 2010    Morbid obesity due to excess calories (H) 2010    Neuropathy in diabetes (H) 2010    Obesity 2010    Sleep apnea 2010    CPAP    Type 2 diabetes, HbA1C goal < 8% (H) 2011             Past Surgical History:   I have reviewed this patient's past surgical history  Past Surgical History:   Procedure Laterality Date    BIOPSY      BURSECTOMY KNEE Right 2024    Procedure: Excisional prepatellar bursectomy, right knee;  Surgeon: Justin Bo MD;  Location: [facility] OR    COLONOSCOPY      COSMETIC SURGERY      EYE SURGERY Bilateral     Catracts     GENITOURINARY SURGERY      surg for undescended testicle    IRRIGATION AND DEBRIDEMENT HAND, COMBINED Right 12/23/2022    Procedure: Right long finger irrigation and debridement with partial amputation of the right long finger. ;  Surgeon: Colby English MD;  Location: [facility] OR    REPAIR HAMMER TOE BILATERAL  05/16/2013    Procedure: REPAIR HAMMER TOE BILATERAL;  Flexor Tenotomy Toes 2,3,4,5 Bilateral Feet;  Surgeon: Saad Bangura DPM;  Location: [facility] OR               Social History:   I have reviewed this patient's social history he is .  Works in [employer] and [employer].  Has 2 children.  Collects coins.  He does not have a special diet or regular exercise program.  He does not use tobacco and drinks very rarely.  Social History     Tobacco Use    Smoking status: Never    Smokeless tobacco: Never    Tobacco comments:     Smoked for about 6 months when I was 16 but haven't touched them since   Substance Use Topics    Alcohol use: Yes     Comment: yearly             Family History:   I have reviewed this patient's family history   Family History   Problem Relation Age of Onset    Breast Cancer Mother     Hypertension Mother     Thyroid Disease Mother     Depression Mother     Alzheimer Disease Mother 82    Obesity Mother     Cancer - colorectal Father     Thyroid Disease Father     Depression Father     Other - See Comments Father         bladder polyps    Prostate Cancer Father     Other Cancer Father     Diabetes Brother         Brother    Depression Brother     Diabetes Brother     Asthma Brother     Depression Brother     Anxiety Disorder Brother     Mental Illness Brother     Heart Disease Maternal Grandmother         CHF    Circulatory Paternal Grandmother     Cancer Paternal Grandfather     No Known Problems Daughter     No Known Problems Son     Diabetes Other         Great Aunt             Allergies:   No Known Allergies          Medications:   I have reviewed this patient's current  medications  (Not in a hospital admission)    Current Facility-Administered Medications   Medication Dose Route Frequency Provider Last Rate Last Admin    heparin 25,000 units in 0.45% NaCl 250 mL ANTICOAGULANT infusion  0-5,000 Units/hr Intravenous Continuous Wiliam Lin DO 12 mL/hr at 01/09/25 1622 1,200 Units/hr at 01/09/25 1622     Current Outpatient Medications   Medication Sig Dispense Refill    acetaminophen (TYLENOL) 325 MG tablet Take 2 tablets (650 mg) by mouth every 4 hours as needed for other (For optimal non-opioid multimodal pain management to improve pain control.)      amLODIPine (NORVASC) 5 MG tablet Take 1 tablet by mouth daily at 2 pm.      atenolol (TENORMIN) 25 MG tablet TAKE 1 TABLET BY MOUTH EVERY DAY 90 tablet 2    bisacodyl (DULCOLAX) 5 MG EC tablet Take 2 tablets at 3 pm the day before your procedure. If your procedure is before 11 am, take 2 additional tablets at 11 pm. If your procedure is after 11 am, take 2 additional tablets at 6 am. For additional instructions refer to your colonoscopy prep instructions. 4 tablet 0    Calcium Carb-Cholecalciferol (CALCIUM 600 + D PO) Take 1 tablet by mouth daily      Continuous Glucose Sensor (FREESTYLE GIULIANA 2 SENSOR) MISC Inject 1 each subcutaneously every 14 days. Use 1 sensor every 14 days. Use to read blood sugars per 's instructions. 6 each 0    finasteride (PROSCAR) 5 MG tablet Take 1 tablet (5 mg) by mouth daily. 90 tablet 1    gabapentin (NEURONTIN) 300 MG capsule Take 1 capsule (300 mg) by mouth 3 times daily (Patient taking differently: Take 300 mg by mouth as needed.) 270 capsule 1    glipiZIDE (GLUCOTROL XL) 10 MG 24 hr tablet TAKE 1 TABLET (10 MG) BY MOUTH DAILY. 90 tablet 7    insulin glargine 100 UNIT/ML pen Inject 35 Units subcutaneously daily. DOSE CHANGE 30 mL 2    insulin pen needle (B-D U/F) 31G X 5 MM miscellaneous USE 1 DAILY OR AS DIRECTED. 100 each 3    latanoprost (XALATAN) 0.005 % ophthalmic solution INSTILL 1  DROP INTO BOTH EYES IN THE EVENING      LEVOXYL 137 MCG tablet Take 1 tablet (137 mcg) by mouth daily. 90 tablet 3    losartan (COZAAR) 100 MG tablet Take 1 tablet (100 mg) by mouth daily. 90 tablet 1    metFORMIN (GLUCOPHAGE) 1000 MG tablet Take 1 tablet (1,000 mg) by mouth 2 times daily (with meals). 180 tablet 0    mirtazapine (REMERON) 45 MG tablet Take 1 tablet (45 mg) by mouth at bedtime. 90 tablet 0    MULTI-VITAMIN OR TABS Take 1 tablet by mouth daily 30 0    polyethylene glycol (GOLYTELY) 236 g suspension The night before the exam at 6 pm drink an 8-ounce glass every 15 minutes until the jug is half empty. If you arrive before 11 AM: Drink the other half of the Golytely jug at 11 PM night before procedure. If you arrive after 11 AM: Drink the other half of the Golytely jug at 6 AM day of procedure. For additional instructions refer to your colonoscopy prep instructions. 4000 mL 0    senna-docusate (SENOKOT-S/PERICOLACE) 8.6-50 MG tablet Take 1 tablet by mouth 2 times daily as needed for constipation 20 tablet 0    simvastatin (ZOCOR) 20 MG tablet Take 20 mg by mouth daily      sodium bicarbonate 325 MG tablet Take 325 mg by mouth 2 times daily.      vilazodone (VIIBRYD) 20 MG TABS tablet Take 1 tablet (20 mg) by mouth daily. 30 tablet 1     Facility-Administered Medications Ordered in Other Encounters   Medication Dose Route Frequency Provider Last Rate Last Admin    atropine injection 0.2-0.4 mg  0.2-0.4 mg Intravenous q1 min prn Demar Brown MD        DOBUTamine (DOBUTREX) 500 mg in D5W 250 mL infusion (adult std conc)  10-50 mcg/kg/min Intravenous Continuous Demar Brown MD   Stopped at 01/09/25 1438    metoprolol (LOPRESSOR) injection 1-5 mg  1-5 mg Intravenous Q3 Min PRN Demar Brown MD   5 mg at 01/09/25 1441    metoprolol (LOPRESSOR) injection 1-5 mg  1-5 mg Intravenous Q3 Min PRN Hebert Pollock MD   5 mg at 01/09/25 1449    metoprolol (LOPRESSOR)  injection 1-5 mg  1-5 mg Intravenous Q3 Min PRN Hebert Pollock MD   3 mg at 01/09/25 1512             Review of Systems:     The 10 point Review of Systems is negative other than noted in the HPI            Data:   All laboratory data reviewed  Results for orders placed or performed during the hospital encounter of 01/09/25 (from the past 24 hours)   EKG 12-lead, tracing only   Result Value Ref Range    Systolic Blood Pressure  mmHg    Diastolic Blood Pressure  mmHg    Ventricular Rate 63 BPM    Atrial Rate 63 BPM    GA Interval 168 ms    QRS Duration 78 ms     ms    QTc 403 ms    P Axis 45 degrees    R AXIS -19 degrees    T Axis 17 degrees    Interpretation ECG       Sinus rhythm  Minimal voltage criteria for LVH, may be normal variant ( R in aVL )  ST & T wave abnormality, consider anterolateral ischemia  Abnormal ECG  When compared with ECG of 09-Jan-2025 14:04, (unconfirmed)  T wave inversion more evident in Anterior leads     CBC with platelets differential    Narrative    The following orders were created for panel order CBC with platelets differential.  Procedure                               Abnormality         Status                     ---------                               -----------         ------                     CBC with platelets and d...[[id number]]  Abnormal            Final result                 Please view results for these tests on the individual orders.   CBC with platelets and differential   Result Value Ref Range    WBC Count 5.4 4.0 - 11.0 10e3/uL    RBC Count 3.41 (L) 4.40 - 5.90 10e6/uL    Hemoglobin 9.8 (L) 13.3 - 17.7 g/dL    Hematocrit 29.2 (L) 40.0 - 53.0 %    MCV 86 78 - 100 fL    MCH 28.7 26.5 - 33.0 pg    MCHC 33.6 31.5 - 36.5 g/dL    RDW 13.3 10.0 - 15.0 %    Platelet Count 278 150 - 450 10e3/uL    % Neutrophils 54 %    % Lymphocytes 29 %    % Monocytes 14 %    % Eosinophils 2 %    % Basophils 1 %    % Immature Granulocytes 0 %    NRBCs per 100 WBC 0 <1 /100     "Absolute Neutrophils 2.9 1.6 - 8.3 10e3/uL    Absolute Lymphocytes 1.6 0.8 - 5.3 10e3/uL    Absolute Monocytes 0.8 0.0 - 1.3 10e3/uL    Absolute Eosinophils 0.1 0.0 - 0.7 10e3/uL    Absolute Basophils 0.0 0.0 - 0.2 10e3/uL    Absolute Immature Granulocytes 0.0 <=0.4 10e3/uL    Absolute NRBCs 0.0 10e3/uL     *Note: Due to a large number of results and/or encounters for the requested time period, some results have not been displayed. A complete set of results can be found in Results Review.       Lab Results   Component Value Date    CHOL 206 02/26/2024    CHOL 139 03/05/2020     Lab Results   Component Value Date    HDL 26 02/26/2024    HDL 33 03/05/2020     Lab Results   Component Value Date     02/26/2024    LDL 88 03/05/2020     Lab Results   Component Value Date    TRIG 252 02/26/2024    TRIG 89 03/05/2020     Lab Results   Component Value Date    CHOLHDLRATIO 4.6 05/21/2015     TSH   Date Value Ref Range Status   08/26/2024 2.37 0.30 - 4.20 uIU/mL Final   09/01/2022 0.73 0.40 - 4.00 mU/L Final   03/05/2020 0.55 0.40 - 4.00 mU/L Final         EKG results: NSR NSST changes with T wave changes but no ST elevation or depression    Echocardiology: normal LVEF at rest.     Cardiac stress testing: Inducible ischemia in distribution of LAD    Cardiac angiography: Pending    Clinically Significant Risk Factors Present on Admission                   # Hypertension: Noted on problem list      # Anemia: based on hgb <11      # DMII: A1C = 7.2 % (Ref range: 0.0 - 5.6 %) within past 6 months    # Obesity: Estimated body mass index is 39.62 kg/m  as calculated from the following:    Height as of this encounter: 1.651 m (5' 5\").    Weight as of this encounter: 108 kg (238 lb 1.6 oz).                     John Charles MD on 1/9/2025 at 4:46 PM    "

## 2025-01-09 NOTE — ED PROVIDER NOTES
Emergency Department Note      History of Present Illness     Chief Complaint   Chest Pain      HPI   Steve Morgan is a 64 year old male with a history of type 2 diabetes, hypertension and hyperlipidemia presenting with chest pain. The patient reports increasing shortness of breath, gagging and chest pain with exertion; he does not have these symptoms at rest.  His wife states he was having hot flashes with the onset of symptoms. He has had similar symptoms for over a year now with a negative stress test. Whilst he got his dobutamine test today, he felt like his chest and throat were tightening, but has resolved since arrival to the ER. He endorses a rhinorrhea and congestion. He has a family history of heart disease; his wife states that his younger brother recently had open heart surgery. Patient denies abdominal pain, diarrhea, hematuria, or emesis. He has not had brain cancer, tumors or easy bleeds. He is not taking aspirin or other blood thinning medication.     Independent Historian   Wife as detailed above.    Review of External Notes   I reviewed dobutamine stress test from today.  I reviewed cardiology note 11/27/24  Past Medical History     Medical History and Problem List   Cellulitis and abscess of trunk  Depression  Depressive disorder  Hyperlipidemia  Hypertension   Hypothyroidism  Neuropathy  Obesity  Sleep apnea  Type 2 diabetes    Medications   acetaminophen  amlodipine   atenolol   bisacodyl   continuous glucose sensor  finasteride  gabapentin   glipizide   insulin glargine   insulin pen needle   levoxyl   losartan   metformin   mirtazapine   senna-docusate  simvastatin   vilazodone      Surgical History   Biopsy  Bursectomy of right knee  Colonoscopy  Cosmetic surgery  Bilateral cataract surgery   Genitourinary surgery  Irrigation and debridement of right hand  Repair hammer toe bilateral      Physical Exam     Patient Vitals for the past 24 hrs:   BP Temp Temp src Pulse Resp SpO2 Height  "Weight   01/09/25 2146 -- -- -- 62 20 98 % -- --   01/09/25 2137 -- -- -- -- -- 94 % 1.651 m (5' 5\") 106.1 kg (233 lb 12.8 oz)   01/09/25 2059 183/84 -- -- -- -- -- -- --   01/09/25 1956 191/79 97.8  F (36.6  C) Oral 65 22 100 % -- --   01/09/25 1916 (!) 153/95 -- -- 68 20 98 % -- --   01/09/25 1708 -- -- -- 66 17 99 % -- --   01/09/25 1648 -- -- -- 60 27 98 % -- --   01/09/25 1638 -- -- -- 63 19 98 % -- --   01/09/25 1628 -- -- -- 63 15 99 % -- --   01/09/25 1618 -- -- -- 63 12 99 % -- --   01/09/25 1608 -- -- -- 61 23 99 % -- --   01/09/25 1558 (!) 160/91 -- -- -- -- -- -- --   01/09/25 1529 (!) 172/97 97.2  F (36.2  C) Temporal 66 18 99 % 1.651 m (5' 5\") 108 kg (238 lb 1.6 oz)     Physical Exam  Constitutional:       General: Not in acute distress.     Appearance: Normal appearance.   HENT:      Head: Normocephalic and atraumatic.   Eyes:      Extraocular Movements: Extraocular movements intact.      Conjunctiva/sclera: Conjunctivae normal.   Cardiovascular:      Rate and Rhythm: Regular rate and rhythm.  Pulmonary:      Effort: Pulmonary effort is normal. No respiratory distress.      Breath sounds: Normal breath sounds.  Abdominal:      General: Abdomen is flat. There is no distension.      Palpations: Abdomen is soft.      Tenderness: There is no abdominal tenderness.   Musculoskeletal:      Cervical back: Normal range of motion. No rigidity.      Right lower leg: No edema.      Left lower leg: No edema.   Skin:     General: Skin is warm and dry.   Neurological:      General: No focal deficit present.      Mental Status: Alert and oriented to person, place, and time.   Psychiatric:         Mood and Affect: Mood normal.         Behavior: Behavior normal.    Diagnostics     Lab Results   Labs Ordered and Resulted from Time of ED Arrival to Time of ED Departure   BASIC METABOLIC PANEL - Abnormal       Result Value    Sodium 135      Potassium 5.1      Chloride 102      Carbon Dioxide (CO2) 22      Anion Gap 11   "    Urea Nitrogen 23.7 (*)     Creatinine 1.70 (*)     GFR Estimate 44 (*)     Calcium 9.7      Glucose 165 (*)    TROPONIN T, HIGH SENSITIVITY - Abnormal    Troponin T, High Sensitivity 78 (*)    CBC WITH PLATELETS AND DIFFERENTIAL - Abnormal    WBC Count 5.4      RBC Count 3.41 (*)     Hemoglobin 9.8 (*)     Hematocrit 29.2 (*)     MCV 86      MCH 28.7      MCHC 33.6      RDW 13.3      Platelet Count 278      % Neutrophils 54      % Lymphocytes 29      % Monocytes 14      % Eosinophils 2      % Basophils 1      % Immature Granulocytes 0      NRBCs per 100 WBC 0      Absolute Neutrophils 2.9      Absolute Lymphocytes 1.6      Absolute Monocytes 0.8      Absolute Eosinophils 0.1      Absolute Basophils 0.0      Absolute Immature Granulocytes 0.0      Absolute NRBCs 0.0         Imaging   XR Chest 2 Views   Final Result   IMPRESSION:       Lungs are clear. No pleural effusions or pneumothorax. Normal pulmonary vascularity.      Normal cardiac silhouette.          EKG   ECG results from 01/09/25   EKG 12-lead, tracing only     Value    Systolic Blood Pressure     Diastolic Blood Pressure     Ventricular Rate 63    Atrial Rate 63    NE Interval 168    QRS Duration 78        QTc 403    P Axis 45    R AXIS -19    T Axis 17    Interpretation ECG      Sinus rhythm  Minimal voltage criteria for LVH, may be normal variant ( R in aVL )  ST & T wave abnormality, consider anterolateral ischemia  Abnormal ECG  When compared with ECG of 09-Jan-2025 14:04, (unconfirmed)  T wave inversion more evident in Anterior leads       *Note: Due to a large number of results and/or encounters for the requested time period, some results have not been displayed. A complete set of results can be found in Results Review.     See ED course for independent interpretation of EKG.    Independent Interpretation   See ED course    ED Course      Medications Administered   Medications   heparin 25,000 units in 0.45% NaCl 250 mL ANTICOAGULANT  infusion (1,200 Units/hr Intravenous Rate/Dose Verify 1/9/25 1917)   aspirin (ASA) tablet 325 mg (325 mg Oral $Given 1/9/25 1616)   heparin ANTICOAGULANT loading dose for  LOW INTENSITY TREATMENT* Give BEFORE starting heparin infusion (6,000 Units Intravenous $Given 1/9/25 1622)     Procedures   Procedures     Discussion of Management   See ED course    ED Course   ED Course as of 01/09/25 2235   Thu Jan 09, 2025   1541 I obtained the history and examined the patient as noted above.     1545 EKG 12-lead, tracing only  Normal sinus rhythm.  Rate of 63.  Normal OK and QRS.  Normal QTc.  No acute ST elevation.  T wave inversion and ST depression in leads V3 through V6.   1615 Discussed patient with Dr. Charles who will evaluate the patient.   1645 Discussed patient with hospitalist Dr. Smith who accepted the patient for admission.   1653 Troponin T, High Sensitivity(!): 78  Dr. Charles aware of elevated troponin. Patient is chest pain free.   1716 Creatinine(!): 1.70  Near baseline   1752 XR Chest 2 Views  On my independent interpretation of chest x-ray, there is no obvious focal opacity, mediastinal widening, or pneumothorax.   2231 Troponin T, High Sensitivity(!): 86  Delta >7       Additional Documentation  None    Medical Decision Making / Diagnosis     CMS Diagnoses: None    MIPS       None    MDM   Steve Morgan is a 64-year-old male as described above presents to the emergency department for exertional chest tightness and shortness of breath for quite some time who underwent dobutamine stress test today which resulted abnormal and patient was noted to come into the ER for further evaluation for consideration for next day cardiac catheterization.  At time of evaluation, patient notes that the chest tightness and shortness of breath symptoms have resolved.  Review of EKG demonstrates significant ST depression and T wave inversion in lateral leads.  These are new compared to prior EKG.  Patient's findings  certainly concerning for unstable angina/NSTEMI.  Cardiac/shortness of breath workup ordered.  Troponin.  Will initiate patient on heparin drip and load with full dose aspirin.  Cardiology consulted recommends next day cardiac catheterization agree with initiation of heparin drip. Patient was admitted to hospital service.  Discussed care plan with patient who voiced understanding and agreement with plan.  Answered all questions.  Additional workup and orders as listed in chart.    Please refer to ED course above as part of continuation of MDM for details on the patient's treatment course and any potential changes or updates beyond my initial evaluation and MDM creation.    Disposition   The patient was admitted to the hospital.     Diagnosis     ICD-10-CM    1. NSTEMI (non-ST elevated myocardial infarction) (H)  I21.4 Case Request Cath Lab: Coronary Angiogram, Percutaneous Coronary Intervention     Case Request Cath Lab: Coronary Angiogram, Percutaneous Coronary Intervention      2. Abnormal stress test  R94.39 Case Request Cath Lab: Coronary Angiogram, Percutaneous Coronary Intervention     Case Request Cath Lab: Coronary Angiogram, Percutaneous Coronary Intervention      3. Unstable angina (H)  I20.0            Discharge Medications   Current Discharge Medication List            Scribe Disclosure:  I, Randee Batres, am serving as a scribe at 3:55 PM on 1/9/2025 to document services personally performed by Wiliam Lin DO based on my observations and the provider's statements to me.        Wiliam Lni DO  01/09/25 1546

## 2025-01-09 NOTE — ED NOTES
"[facility]  ED Nurse Handoff Report    ED Chief complaint: Chest Pain  . ED Diagnosis:   Final diagnoses:   Abnormal stress test   Unstable angina (H)   NSTEMI (non-ST elevated myocardial infarction) (H)       Allergies: No Known Allergies    Code Status: Full Code    Activity level - Baseline/Home:  independent.  Activity Level - Current:   standby.   Lift room needed: No.   Bariatric: No   Needed: No   Isolation: No.   Infection: Not Applicable.     Respiratory status: Room air    Vital Signs (within 30 minutes):   Vitals:    01/09/25 1529   BP: (!) 172/97   Pulse: 66   Resp: 18   Temp: 97.2  F (36.2  C)   TempSrc: Temporal   SpO2: 99%   Weight: 108 kg (238 lb 1.6 oz)   Height: 1.651 m (5' 5\")       Cardiac Rhythm:  ,      Pain level:    Patient confused: No.   Patient Falls Risk: activity supervised.   Elimination Status: Has voided     Patient Report - Per provider note: 64-year-old male as described above presents to the emergency department for exertional chest tightness and shortness of breath for quite some time who underwent dobutamine stress test today which resulted abnormal and patient was noted to come into the ER for further evaluation for consideration for next day cardiac catheterization.  At time of evaluation, patient notes that the chest tightness and shortness of breath symptoms have resolved.  Review of EKG demonstrates significant ST depression and T wave inversion in lateral leads.  These are new compared to prior EKG.  Patient's findings certainly concerning for unstable angina/NSTEMI. .   Focused Assessment:      Abnormal Results:   Labs Ordered and Resulted from Time of ED Arrival to Time of ED Departure   BASIC METABOLIC PANEL - Abnormal       Result Value    Sodium 135      Potassium 5.1      Chloride 102      Carbon Dioxide (CO2) 22      Anion Gap 11      Urea Nitrogen 23.7 (*)     Creatinine 1.70 (*)     GFR Estimate 44 (*)     Calcium 9.7      Glucose " 165 (*)    TROPONIN T, HIGH SENSITIVITY - Abnormal    Troponin T, High Sensitivity 78 (*)    CBC WITH PLATELETS AND DIFFERENTIAL - Abnormal    WBC Count 5.4      RBC Count 3.41 (*)     Hemoglobin 9.8 (*)     Hematocrit 29.2 (*)     MCV 86      MCH 28.7      MCHC 33.6      RDW 13.3      Platelet Count 278      % Neutrophils 54      % Lymphocytes 29      % Monocytes 14      % Eosinophils 2      % Basophils 1      % Immature Granulocytes 0      NRBCs per 100 WBC 0      Absolute Neutrophils 2.9      Absolute Lymphocytes 1.6      Absolute Monocytes 0.8      Absolute Eosinophils 0.1      Absolute Basophils 0.0      Absolute Immature Granulocytes 0.0      Absolute NRBCs 0.0          XR Chest 2 Views    (Results Pending)       Treatments provided:   Family Comments: Wife at bedside.   OBS brochure/video discussed/provided to patient:  No  ED Medications:   Medications   heparin 25,000 units in 0.45% NaCl 250 mL ANTICOAGULANT infusion (1,200 Units/hr Intravenous $New Bag 1/9/25 1622)   aspirin (ASA) tablet 325 mg (325 mg Oral $Given 1/9/25 1616)   heparin ANTICOAGULANT loading dose for  LOW INTENSITY TREATMENT* Give BEFORE starting heparin infusion (6,000 Units Intravenous $Given 1/9/25 1622)       Drips infusing:  Yes  For the majority of the shift this patient was Green.   Interventions performed were .    Sepsis treatment initiated: No    Cares/treatment/interventions/medications to be completed following ED care:     ED Nurse Name: Hunter Whittaker RN  5:07 PM

## 2025-01-09 NOTE — PHARMACY-ADMISSION MEDICATION HISTORY
Pharmacist Admission Medication History    Admission medication history is complete. The information provided in this note is only as accurate as the sources available at the time of the update.    Information Source(s): Patient via in-person    Pertinent Information: none    Changes made to PTA medication list:  Added: None  Deleted: golytely, dulcolax, atenolol  Changed: None    Allergies reviewed with patient and updates made in EHR: yes    Medication History Completed By: Thomas Jacques RPH 1/9/2025 5:34 PM    PTA Med List   Medication Sig Last Dose/Taking    acetaminophen (TYLENOL) 325 MG tablet Take 2 tablets (650 mg) by mouth every 4 hours as needed for other (For optimal non-opioid multimodal pain management to improve pain control.) Taking As Needed    amLODIPine (NORVASC) 5 MG tablet Take 1 tablet by mouth daily at 2 pm. 1/8/2025    Calcium Carb-Cholecalciferol (CALCIUM 600 + D PO) Take 1 tablet by mouth daily 1/8/2025    finasteride (PROSCAR) 5 MG tablet Take 1 tablet (5 mg) by mouth daily. 1/8/2025 Bedtime    gabapentin (NEURONTIN) 300 MG capsule Take 1 capsule (300 mg) by mouth 3 times daily (Patient taking differently: Take 300 mg by mouth as needed.) More than a month    glipiZIDE (GLUCOTROL XL) 10 MG 24 hr tablet TAKE 1 TABLET (10 MG) BY MOUTH DAILY. 1/8/2025    insulin glargine 100 UNIT/ML pen Inject 35 Units subcutaneously daily. DOSE CHANGE 1/8/2025 Bedtime    latanoprost (XALATAN) 0.005 % ophthalmic solution INSTILL 1 DROP INTO BOTH EYES IN THE EVENING 1/8/2025 Bedtime    LEVOXYL 137 MCG tablet Take 1 tablet (137 mcg) by mouth daily. 1/8/2025    losartan (COZAAR) 100 MG tablet Take 1 tablet (100 mg) by mouth daily. 1/8/2025    metFORMIN (GLUCOPHAGE) 1000 MG tablet Take 1 tablet (1,000 mg) by mouth 2 times daily (with meals). 1/8/2025    mirtazapine (REMERON) 45 MG tablet Take 1 tablet (45 mg) by mouth at bedtime. 1/8/2025 Bedtime    MULTI-VITAMIN OR TABS Take 1 tablet by mouth daily 1/8/2025     senna-docusate (SENOKOT-S/PERICOLACE) 8.6-50 MG tablet Take 1 tablet by mouth 2 times daily as needed for constipation Taking As Needed    simvastatin (ZOCOR) 20 MG tablet Take 20 mg by mouth daily 1/8/2025 Bedtime    sodium bicarbonate 325 MG tablet Take 325 mg by mouth 2 times daily. 1/8/2025    vilazodone (VIIBRYD) 20 MG TABS tablet Take 1 tablet (20 mg) by mouth daily. 1/8/2025 Bedtime

## 2025-01-09 NOTE — H&P
[facility]    History and Physical - Hospitalist Service       Date of Admission:  1/9/2025    Assessment & Plan      Steve Morgan is a 64 year old male with PMH of T2DM, CKD3, morbid obesity, HTN, hypothyroidism, who was admitted on 1/9/2025 after an abnormal stress test.    NSTEMI  HTN  HLD  Presenting after an abnormal stress test that was obtained for exertional substernal chest pain with associated dyspnea.  Family history of heart disease in his younger brother (recently had a double bypass) and grandfather.  Dobutamine stress echo shows normal EF. Baseline EKG with diffuse non specific T wave changes that normalized with dobutamine, but patient became more hypertensive. The mid LV cavity narrowed and small portion of apex and apical lateral wall may have become more hypokinetic that at rest. Post dobutamine echo suggest ongoing mild hypokinesis. Patient reported chest discomfort. Symptoms improved after IV metoprolol.   On arrival, /67, HR 66, temp 97.2, 99% on RA. Labs notable for Hgb 9.8 (baseline ~10), WBC 5.4, plt 278, Na 135, K 5.1, Cr 1.7. EKG with non-specific T wave changes. Troponin 78.   Cardiology consulted in ER. Plan for angiogram tomorrow.   - Heparin gtt  - ASA  - Switch PTA simvastatin to atorvastatin  - Increase PTA atenolol to 50mg every day   - Continue PTA amlodipine, hold PTA losartan for angio    T2DM  PTA on insulin glargine 34U, glipizide, metformin. Last A1c 7.2 on 1/6/25.   Patient sometimes gets low BG in the AM.  on arrival. Will half his glargine dose as he will be NPO tomorrow.   - Glargine 15 + LDSSI    CKD3 - Cr at baseline of ~1.7  Morbid obesity - complicates cares  Hypothyroidism - PTA levothyroxine           Diet:  regular diet, NPO at midnight  DVT Prophylaxis: Heparin gtt  [place] Catheter: Not present  Lines: None     Cardiac Monitoring: None  Code Status:  full    Clinically Significant Risk Factors Present on Admission                 "   # Hypertension: Noted on problem list      # Anemia: based on hgb <11      # DMII: A1C = 7.2 % (Ref range: 0.0 - 5.6 %) within past 6 months    # Obesity: Estimated body mass index is 39.62 kg/m  as calculated from the following:    Height as of this encounter: 1.651 m (5' 5\").    Weight as of this encounter: 108 kg (238 lb 1.6 oz).              Disposition Plan     Medically Ready for Discharge: Anticipated in 2-4 Days           Alka Smith DO  Hospitalist Service  [facility]  Securely message with [employer] (more info)  Text page via [facility] Paging/Directory     ______________________________________________________________________    Chief Complaint   Intermittent chest pain    History is obtained from the patient    History of Present Illness   Steve Morgan is a 64 year old male who presents after an abnormal stress test that was obtained for exertional chest pain.  Has noted over the past 6 months and has been getting more severe.  Reports substernal chest pain that has migrated to the left side of his chest in the last few days.  Associated with dyspnea and sometimes with nausea.  Episodes of chest discomfort can last for up to 10 minutes.  Otherwise has been feeling well with no recent fever/chills, cough, abdominal pain, dysuria.  He has chronic diarrhea which has been at its baseline.      Past Medical History    Past Medical History:   Diagnosis Date    Cellulitis and abscess of trunk 06/27/2017    Depression     Depressive disorder     Hyperlipidemia LDL goal <100 10/31/2010    Hypertension goal BP (blood pressure) < 140/90 03/17/2011    Hypothyroidism 01/12/2010    Morbid obesity due to excess calories (H) 01/12/2010    Neuropathy in diabetes (H) 01/12/2010    Obesity 01/12/2010    Sleep apnea 01/12/2010    CPAP    Type 2 diabetes, HbA1C goal < 8% (H) 03/08/2011       Past Surgical History   Past Surgical History:   Procedure Laterality Date    BIOPSY      BURSECTOMY KNEE " Right 05/20/2024    Procedure: Excisional prepatellar bursectomy, right knee;  Surgeon: Justin Bo MD;  Location: [facility] OR    COLONOSCOPY      COSMETIC SURGERY      EYE SURGERY Bilateral     Catracts    GENITOURINARY SURGERY      surg for undescended testicle    IRRIGATION AND DEBRIDEMENT HAND, COMBINED Right 12/23/2022    Procedure: Right long finger irrigation and debridement with partial amputation of the right long finger. ;  Surgeon: Colby English MD;  Location: [facility] OR    REPAIR HAMMER TOE BILATERAL  05/16/2013    Procedure: REPAIR HAMMER TOE BILATERAL;  Flexor Tenotomy Toes 2,3,4,5 Bilateral Feet;  Surgeon: Saad Bangura DPM;  Location: [facility] OR       Prior to Admission Medications   Prior to Admission Medications   Prescriptions Last Dose Informant Patient Reported? Taking?   Calcium Carb-Cholecalciferol (CALCIUM 600 + D PO) 1/8/2025  Yes Yes   Sig: Take 1 tablet by mouth daily   Continuous Glucose Sensor (FREESTYLE [place] 2 SENSOR[place] [facility] [place]   No No   Sig: Inject 1 each subcutaneously every 14 days. Use 1 sensor every 14 days. Use to read blood sugars per 's instructions.   LEVOXYL 137 MCG tablet 1/8/2025  No Yes   Sig: Take 1 tablet (137 mcg) by mouth daily.   MULTI-VITAMIN OR TABS 1/8/2025  Yes Yes   Sig: Take 1 tablet by mouth daily   acetaminophen (TYLENOL) 325 MG tablet   No Yes   Sig: Take 2 tablets (650 mg) by mouth every 4 hours as needed for other (For optimal non-opioid multimodal pain management to improve pain control.)   amLODIPine (NORVASC) 5 MG tablet 1/8/2025  Yes Yes   Sig: Take 1 tablet by mouth daily at 2 pm.   finasteride (PROSCAR) 5 MG tablet 1/8/2025 Bedtime  No Yes   Sig: Take 1 tablet (5 mg) by mouth daily.   gabapentin (NEURONTIN) 300 MG capsule More than a month  No Yes   Sig: Take 1 capsule (300 mg) by mouth 3 times daily   Patient taking differently: Take 300 mg by mouth as needed.   glipiZIDE (GLUCOTROL XL) 10 MG 24 hr tablet 1/8/2025  No Yes   Sig: TAKE 1  TABLET (10 MG) BY MOUTH DAILY.   insulin glargine 100 UNIT/ML pen 1/8/2025 Bedtime  No Yes   Sig: Inject 35 Units subcutaneously daily. DOSE CHANGE   insulin pen needle (B-D U/F) 31G X 5 MM miscellaneous   No No   Sig: USE 1 DAILY OR AS DIRECTED.   latanoprost (XALATAN) 0.005 % ophthalmic solution 1/8/2025 Bedtime  Yes Yes   Sig: INSTILL 1 DROP INTO BOTH EYES IN THE EVENING   losartan (COZAAR) 100 MG tablet 1/8/2025  No Yes   Sig: Take 1 tablet (100 mg) by mouth daily.   metFORMIN (GLUCOPHAGE) 1000 MG tablet 1/8/2025  No Yes   Sig: Take 1 tablet (1,000 mg) by mouth 2 times daily (with meals).   mirtazapine (REMERON) 45 MG tablet 1/8/2025 Bedtime  No Yes   Sig: Take 1 tablet (45 mg) by mouth at bedtime.   senna-docusate (SENOKOT-S/PERICOLACE) 8.6-50 MG tablet   No Yes   Sig: Take 1 tablet by mouth 2 times daily as needed for constipation   simvastatin (ZOCOR) 20 MG tablet 1/8/2025 Bedtime  Yes Yes   Sig: Take 20 mg by mouth daily   sodium bicarbonate 325 MG tablet 1/8/2025  Yes Yes   Sig: Take 325 mg by mouth 2 times daily.   vilazodone (VIIBRYD) 20 MG TABS tablet 1/8/2025 Bedtime  No Yes   Sig: Take 1 tablet (20 mg) by mouth daily.      Facility-Administered Medications: None           Physical Exam   Vital Signs: Temp: 97.2  F (36.2  C) Temp src: Temporal BP: (!) 160/91 Pulse: 66   Resp: 17 SpO2: 99 % O2 Device: None (Room air)    Weight: 238 lbs 1.55 oz    Constitutional: Awake, alert, no distress, and cooperative  Cardiovascular: Regular rate and rhythm, normal S1 and S2, no S3 or S4, and no murmur noted  Respiratory: No increased work of breathing, good air exchange, clear to auscultation bilaterally, no crackles or wheezing  Gastrointestinal: Abdomen soft, non-tender, non-distended. BS normal. No masses, organomegaly     Medical Decision Making       65 MINUTES SPENT BY ME on the date of service doing chart review, history, exam, documentation & further activities per the note.      Data     I have personally  reviewed the following data over the past 24 hrs:    5.4  \   9.8 (L)   / 278     135 102 23.7 (H) /  165 (H)   5.1 22 1.70 (H) \     Trop: 78 (H) BNP: N/A

## 2025-01-09 NOTE — PROGRESS NOTES
Patient came into [facility] for an outpatient Dobutamine stress echo today. Contacted Dr. Pollock regarding patient's symptoms of chest pain/neck tightness and WMA on echo. Patient given 13 mg of Metoprolol post Infusion. Patient sent to ER for further evaluation and possible Angiogram tomorrow.

## 2025-01-10 ENCOUNTER — HOSPITAL ENCOUNTER (INPATIENT)
Facility: CLINIC | Age: 65
LOS: 5 days | Discharge: HOME | DRG: 322 | End: 2025-01-15
Attending: STUDENT IN AN ORGANIZED HEALTH CARE EDUCATION/TRAINING PROGRAM | Admitting: INTERNAL MEDICINE
Payer: MEDICARE

## 2025-01-10 VITALS
OXYGEN SATURATION: 97 % | SYSTOLIC BLOOD PRESSURE: 165 MMHG | HEART RATE: 53 BPM | RESPIRATION RATE: 18 BRPM | TEMPERATURE: 98.3 F | HEIGHT: 65 IN | BODY MASS INDEX: 38.95 KG/M2 | DIASTOLIC BLOOD PRESSURE: 84 MMHG | WEIGHT: 233.8 LBS

## 2025-01-10 DIAGNOSIS — I21.4 NSTEMI (NON-ST ELEVATED MYOCARDIAL INFARCTION) (H): Primary | ICD-10-CM

## 2025-01-10 DIAGNOSIS — I25.118 CORONARY ARTERY DISEASE OF NATIVE ARTERY OF NATIVE HEART WITH STABLE ANGINA PECTORIS: ICD-10-CM

## 2025-01-10 DIAGNOSIS — E11.40 TYPE 2 DIABETES MELLITUS WITH DIABETIC NEUROPATHY, WITH LONG-TERM CURRENT USE OF INSULIN (H): ICD-10-CM

## 2025-01-10 DIAGNOSIS — Z79.4 TYPE 2 DIABETES MELLITUS WITH DIABETIC NEUROPATHY, WITH LONG-TERM CURRENT USE OF INSULIN (H): ICD-10-CM

## 2025-01-10 DIAGNOSIS — R94.39 ABNORMAL STRESS TEST: ICD-10-CM

## 2025-01-10 PROBLEM — I25.10 CAD (CORONARY ARTERY DISEASE): Status: ACTIVE | Noted: 2025-01-10

## 2025-01-10 LAB
ANION GAP SERPL CALCULATED.3IONS-SCNC: 14 MMOL/L (ref 7–15)
ATRIAL RATE - MUSE: 63 BPM
BUN SERPL-MCNC: 24.7 MG/DL (ref 8–23)
CALCIUM SERPL-MCNC: 9.6 MG/DL (ref 8.8–10.4)
CHLORIDE SERPL-SCNC: 100 MMOL/L (ref 98–107)
CREAT SERPL-MCNC: 1.76 MG/DL (ref 0.67–1.17)
DIASTOLIC BLOOD PRESSURE - MUSE: NORMAL MMHG
EGFRCR SERPLBLD CKD-EPI 2021: 42 ML/MIN/1.73M2
ERYTHROCYTE [DISTWIDTH] IN BLOOD BY AUTOMATED COUNT: 13.4 % (ref 10–15)
GLUCOSE BLDC GLUCOMTR-MCNC: 125 MG/DL (ref 70–99)
GLUCOSE BLDC GLUCOMTR-MCNC: 130 MG/DL (ref 70–99)
GLUCOSE BLDC GLUCOMTR-MCNC: 137 MG/DL (ref 70–99)
GLUCOSE BLDC GLUCOMTR-MCNC: 177 MG/DL (ref 70–99)
GLUCOSE BLDC GLUCOMTR-MCNC: 87 MG/DL (ref 70–99)
GLUCOSE SERPL-MCNC: 132 MG/DL (ref 70–99)
HCO3 SERPL-SCNC: 20 MMOL/L (ref 22–29)
HCT VFR BLD AUTO: 30.5 % (ref 40–53)
HGB BLD-MCNC: 10.5 G/DL (ref 13.3–17.7)
INTERPRETATION ECG - MUSE: NORMAL
MCH RBC QN AUTO: 29.1 PG (ref 26.5–33)
MCHC RBC AUTO-ENTMCNC: 34.4 G/DL (ref 31.5–36.5)
MCV RBC AUTO: 85 FL (ref 78–100)
P AXIS - MUSE: 45 DEGREES
PLATELET # BLD AUTO: 292 10E3/UL (ref 150–450)
POTASSIUM SERPL-SCNC: 5.1 MMOL/L (ref 3.4–5.3)
PR INTERVAL - MUSE: 168 MS
QRS DURATION - MUSE: 78 MS
QT - MUSE: 394 MS
QTC - MUSE: 403 MS
R AXIS - MUSE: -19 DEGREES
RBC # BLD AUTO: 3.61 10E6/UL (ref 4.4–5.9)
SODIUM SERPL-SCNC: 134 MMOL/L (ref 135–145)
SYSTOLIC BLOOD PRESSURE - MUSE: NORMAL MMHG
T AXIS - MUSE: 17 DEGREES
UFH PPP CHRO-ACNC: 0.36 IU/ML
VENTRICULAR RATE- MUSE: 63 BPM
WBC # BLD AUTO: 5.7 10E3/UL (ref 4–11)

## 2025-01-10 PROCEDURE — 99233 SBSQ HOSP IP/OBS HIGH 50: CPT | Mod: AI | Performed by: INTERNAL MEDICINE

## 2025-01-10 PROCEDURE — 250N000011 HC RX IP 250 OP 636: Performed by: INTERNAL MEDICINE

## 2025-01-10 PROCEDURE — 85014 HEMATOCRIT: CPT | Performed by: STUDENT IN AN ORGANIZED HEALTH CARE EDUCATION/TRAINING PROGRAM

## 2025-01-10 PROCEDURE — 258N000003 HC RX IP 258 OP 636: Performed by: INTERNAL MEDICINE

## 2025-01-10 PROCEDURE — 82435 ASSAY OF BLOOD CHLORIDE: CPT | Performed by: STUDENT IN AN ORGANIZED HEALTH CARE EDUCATION/TRAINING PROGRAM

## 2025-01-10 PROCEDURE — C1894 INTRO/SHEATH, NON-LASER: HCPCS | Performed by: INTERNAL MEDICINE

## 2025-01-10 PROCEDURE — 210N000002 HC R&B HEART CARE

## 2025-01-10 PROCEDURE — 99152 MOD SED SAME PHYS/QHP 5/>YRS: CPT | Performed by: INTERNAL MEDICINE

## 2025-01-10 PROCEDURE — 80048 BASIC METABOLIC PNL TOTAL CA: CPT | Performed by: STUDENT IN AN ORGANIZED HEALTH CARE EDUCATION/TRAINING PROGRAM

## 2025-01-10 PROCEDURE — 999N000157 HC STATISTIC RCP TIME EA 10 MIN

## 2025-01-10 PROCEDURE — 99207 PR NO BILLABLE SERVICE THIS VISIT: CPT | Performed by: INTERNAL MEDICINE

## 2025-01-10 PROCEDURE — C1887 CATHETER, GUIDING: HCPCS | Performed by: INTERNAL MEDICINE

## 2025-01-10 PROCEDURE — 36415 COLL VENOUS BLD VENIPUNCTURE: CPT | Performed by: STUDENT IN AN ORGANIZED HEALTH CARE EDUCATION/TRAINING PROGRAM

## 2025-01-10 PROCEDURE — 250N000013 HC RX MED GY IP 250 OP 250 PS 637: Performed by: INTERNAL MEDICINE

## 2025-01-10 PROCEDURE — 272N000001 HC OR GENERAL SUPPLY STERILE: Performed by: INTERNAL MEDICINE

## 2025-01-10 PROCEDURE — 93454 CORONARY ARTERY ANGIO S&I: CPT | Performed by: INTERNAL MEDICINE

## 2025-01-10 PROCEDURE — 85520 HEPARIN ASSAY: CPT | Performed by: STUDENT IN AN ORGANIZED HEALTH CARE EDUCATION/TRAINING PROGRAM

## 2025-01-10 PROCEDURE — 250N000013 HC RX MED GY IP 250 OP 250 PS 637: Performed by: STUDENT IN AN ORGANIZED HEALTH CARE EDUCATION/TRAINING PROGRAM

## 2025-01-10 PROCEDURE — B2111ZZ FLUOROSCOPY OF MULTIPLE CORONARY ARTERIES USING LOW OSMOLAR CONTRAST: ICD-10-PCS | Performed by: INTERNAL MEDICINE

## 2025-01-10 PROCEDURE — 93454 CORONARY ARTERY ANGIO S&I: CPT | Mod: 26 | Performed by: INTERNAL MEDICINE

## 2025-01-10 PROCEDURE — 250N000012 HC RX MED GY IP 250 OP 636 PS 637: Performed by: INTERNAL MEDICINE

## 2025-01-10 PROCEDURE — 250N000009 HC RX 250: Performed by: INTERNAL MEDICINE

## 2025-01-10 PROCEDURE — 250N000011 HC RX IP 250 OP 636: Performed by: EMERGENCY MEDICINE

## 2025-01-10 RX ORDER — MIRTAZAPINE 15 MG/1
45 TABLET, FILM COATED ORAL AT BEDTIME
Status: DISCONTINUED | OUTPATIENT
Start: 2025-01-10 | End: 2025-01-15 | Stop reason: HOSPADM

## 2025-01-10 RX ORDER — LORAZEPAM 0.5 MG/1
0.5 TABLET ORAL
Status: DISCONTINUED | OUTPATIENT
Start: 2025-01-10 | End: 2025-01-10 | Stop reason: HOSPADM

## 2025-01-10 RX ORDER — NICOTINE POLACRILEX 4 MG
15-30 LOZENGE BUCCAL
Status: DISCONTINUED | OUTPATIENT
Start: 2025-01-10 | End: 2025-01-15 | Stop reason: HOSPADM

## 2025-01-10 RX ORDER — LOSARTAN POTASSIUM 100 MG/1
100 TABLET ORAL DAILY
Status: DISCONTINUED | OUTPATIENT
Start: 2025-01-11 | End: 2025-01-15 | Stop reason: HOSPADM

## 2025-01-10 RX ORDER — LIDOCAINE 40 MG/G
CREAM TOPICAL
Status: DISCONTINUED | OUTPATIENT
Start: 2025-01-10 | End: 2025-01-10

## 2025-01-10 RX ORDER — NALOXONE HYDROCHLORIDE 0.4 MG/ML
0.2 INJECTION, SOLUTION INTRAMUSCULAR; INTRAVENOUS; SUBCUTANEOUS
Status: DISCONTINUED | OUTPATIENT
Start: 2025-01-10 | End: 2025-01-15 | Stop reason: HOSPADM

## 2025-01-10 RX ORDER — VILAZODONE HYDROCHLORIDE 20 MG/1
20 TABLET ORAL DAILY
Status: DISCONTINUED | OUTPATIENT
Start: 2025-01-11 | End: 2025-01-15 | Stop reason: HOSPADM

## 2025-01-10 RX ORDER — AMLODIPINE BESYLATE 5 MG/1
5 TABLET ORAL DAILY
Status: DISCONTINUED | OUTPATIENT
Start: 2025-01-11 | End: 2025-01-15 | Stop reason: HOSPADM

## 2025-01-10 RX ORDER — ACETAMINOPHEN 650 MG/1
650 SUPPOSITORY RECTAL EVERY 4 HOURS PRN
Status: DISCONTINUED | OUTPATIENT
Start: 2025-01-10 | End: 2025-01-15 | Stop reason: HOSPADM

## 2025-01-10 RX ORDER — SODIUM BICARBONATE 325 MG/1
325 TABLET ORAL 2 TIMES DAILY
Status: DISCONTINUED | OUTPATIENT
Start: 2025-01-11 | End: 2025-01-15 | Stop reason: HOSPADM

## 2025-01-10 RX ORDER — AMOXICILLIN 250 MG
2 CAPSULE ORAL 2 TIMES DAILY PRN
Status: DISCONTINUED | OUTPATIENT
Start: 2025-01-10 | End: 2025-01-15 | Stop reason: HOSPADM

## 2025-01-10 RX ORDER — AMOXICILLIN 250 MG
1 CAPSULE ORAL 2 TIMES DAILY PRN
Status: DISCONTINUED | OUTPATIENT
Start: 2025-01-10 | End: 2025-01-15 | Stop reason: HOSPADM

## 2025-01-10 RX ORDER — NITROGLYCERIN 0.4 MG/1
0.4 TABLET SUBLINGUAL EVERY 5 MIN PRN
Status: DISCONTINUED | OUTPATIENT
Start: 2025-01-10 | End: 2025-01-15 | Stop reason: HOSPADM

## 2025-01-10 RX ORDER — NALOXONE HYDROCHLORIDE 0.4 MG/ML
0.4 INJECTION, SOLUTION INTRAMUSCULAR; INTRAVENOUS; SUBCUTANEOUS
Status: DISCONTINUED | OUTPATIENT
Start: 2025-01-10 | End: 2025-01-15 | Stop reason: HOSPADM

## 2025-01-10 RX ORDER — HEPARIN SODIUM 10000 [USP'U]/100ML
0-5000 INJECTION, SOLUTION INTRAVENOUS CONTINUOUS
Status: DISCONTINUED | OUTPATIENT
Start: 2025-01-10 | End: 2025-01-13

## 2025-01-10 RX ORDER — ATROPINE SULFATE 0.1 MG/ML
0.5 INJECTION INTRAVENOUS
Status: DISCONTINUED | OUTPATIENT
Start: 2025-01-10 | End: 2025-01-10 | Stop reason: HOSPADM

## 2025-01-10 RX ORDER — ACETAMINOPHEN 325 MG/1
650 TABLET ORAL EVERY 4 HOURS PRN
Status: DISCONTINUED | OUTPATIENT
Start: 2025-01-10 | End: 2025-01-15 | Stop reason: HOSPADM

## 2025-01-10 RX ORDER — LATANOPROST 50 UG/ML
1 SOLUTION/ DROPS OPHTHALMIC AT BEDTIME
Status: DISCONTINUED | OUTPATIENT
Start: 2025-01-10 | End: 2025-01-15 | Stop reason: HOSPADM

## 2025-01-10 RX ORDER — OXYCODONE HYDROCHLORIDE 5 MG/1
5 TABLET ORAL EVERY 4 HOURS PRN
Status: DISCONTINUED | OUTPATIENT
Start: 2025-01-10 | End: 2025-01-10 | Stop reason: HOSPADM

## 2025-01-10 RX ORDER — POLYETHYLENE GLYCOL 3350 17 G/17G
17 POWDER, FOR SOLUTION ORAL 2 TIMES DAILY PRN
Status: DISCONTINUED | OUTPATIENT
Start: 2025-01-10 | End: 2025-01-15 | Stop reason: HOSPADM

## 2025-01-10 RX ORDER — ASPIRIN 325 MG
325 TABLET, DELAYED RELEASE (ENTERIC COATED) ORAL DAILY
Status: DISCONTINUED | OUTPATIENT
Start: 2025-01-11 | End: 2025-01-11

## 2025-01-10 RX ORDER — POTASSIUM CHLORIDE 1500 MG/1
20 TABLET, EXTENDED RELEASE ORAL
Status: DISCONTINUED | OUTPATIENT
Start: 2025-01-10 | End: 2025-01-10 | Stop reason: HOSPADM

## 2025-01-10 RX ORDER — NALOXONE HYDROCHLORIDE 0.4 MG/ML
0.2 INJECTION, SOLUTION INTRAMUSCULAR; INTRAVENOUS; SUBCUTANEOUS
Status: DISCONTINUED | OUTPATIENT
Start: 2025-01-10 | End: 2025-01-10 | Stop reason: HOSPADM

## 2025-01-10 RX ORDER — LIDOCAINE 40 MG/G
CREAM TOPICAL
Status: DISCONTINUED | OUTPATIENT
Start: 2025-01-10 | End: 2025-01-15 | Stop reason: HOSPADM

## 2025-01-10 RX ORDER — SODIUM CHLORIDE 9 MG/ML
75 INJECTION, SOLUTION INTRAVENOUS CONTINUOUS
Status: ACTIVE | OUTPATIENT
Start: 2025-01-10 | End: 2025-01-10

## 2025-01-10 RX ORDER — NITROGLYCERIN 5 MG/ML
VIAL (ML) INTRAVENOUS
Status: DISCONTINUED | OUTPATIENT
Start: 2025-01-10 | End: 2025-01-10 | Stop reason: HOSPADM

## 2025-01-10 RX ORDER — CALCIUM CARBONATE 500 MG/1
1000 TABLET, CHEWABLE ORAL 4 TIMES DAILY PRN
Status: DISCONTINUED | OUTPATIENT
Start: 2025-01-10 | End: 2025-01-15 | Stop reason: HOSPADM

## 2025-01-10 RX ORDER — DEXTROSE MONOHYDRATE 25 G/50ML
25-50 INJECTION, SOLUTION INTRAVENOUS
Status: DISCONTINUED | OUTPATIENT
Start: 2025-01-10 | End: 2025-01-15 | Stop reason: HOSPADM

## 2025-01-10 RX ORDER — ASPIRIN 81 MG/1
243 TABLET, CHEWABLE ORAL ONCE
Status: COMPLETED | OUTPATIENT
Start: 2025-01-10 | End: 2025-01-10

## 2025-01-10 RX ORDER — ASPIRIN 325 MG
325 TABLET ORAL ONCE
Status: COMPLETED | OUTPATIENT
Start: 2025-01-10 | End: 2025-01-10

## 2025-01-10 RX ORDER — NALOXONE HYDROCHLORIDE 0.4 MG/ML
0.4 INJECTION, SOLUTION INTRAMUSCULAR; INTRAVENOUS; SUBCUTANEOUS
Status: DISCONTINUED | OUTPATIENT
Start: 2025-01-10 | End: 2025-01-10 | Stop reason: HOSPADM

## 2025-01-10 RX ORDER — VERAPAMIL HYDROCHLORIDE 2.5 MG/ML
INJECTION, SOLUTION INTRAVENOUS
Status: DISCONTINUED | OUTPATIENT
Start: 2025-01-10 | End: 2025-01-10 | Stop reason: HOSPADM

## 2025-01-10 RX ORDER — OXYCODONE HYDROCHLORIDE 10 MG/1
10 TABLET ORAL EVERY 4 HOURS PRN
Status: DISCONTINUED | OUTPATIENT
Start: 2025-01-10 | End: 2025-01-10 | Stop reason: HOSPADM

## 2025-01-10 RX ORDER — FENTANYL CITRATE 50 UG/ML
25 INJECTION, SOLUTION INTRAMUSCULAR; INTRAVENOUS
Status: DISCONTINUED | OUTPATIENT
Start: 2025-01-10 | End: 2025-01-10 | Stop reason: HOSPADM

## 2025-01-10 RX ORDER — ACETAMINOPHEN 325 MG/1
650 TABLET ORAL EVERY 4 HOURS PRN
Status: DISCONTINUED | OUTPATIENT
Start: 2025-01-10 | End: 2025-01-10

## 2025-01-10 RX ORDER — FENTANYL CITRATE 50 UG/ML
INJECTION, SOLUTION INTRAMUSCULAR; INTRAVENOUS
Status: DISCONTINUED | OUTPATIENT
Start: 2025-01-10 | End: 2025-01-10 | Stop reason: HOSPADM

## 2025-01-10 RX ORDER — HYDROMORPHONE HCL IN WATER/PF 6 MG/30 ML
0.4 PATIENT CONTROLLED ANALGESIA SYRINGE INTRAVENOUS
Status: DISCONTINUED | OUTPATIENT
Start: 2025-01-10 | End: 2025-01-15 | Stop reason: HOSPADM

## 2025-01-10 RX ORDER — SIMVASTATIN 20 MG
20 TABLET ORAL AT BEDTIME
Status: DISCONTINUED | OUTPATIENT
Start: 2025-01-10 | End: 2025-01-13

## 2025-01-10 RX ORDER — FINASTERIDE 5 MG/1
5 TABLET, FILM COATED ORAL DAILY
Status: DISCONTINUED | OUTPATIENT
Start: 2025-01-11 | End: 2025-01-15 | Stop reason: HOSPADM

## 2025-01-10 RX ORDER — SODIUM CHLORIDE 9 MG/ML
INJECTION, SOLUTION INTRAVENOUS CONTINUOUS
Status: DISCONTINUED | OUTPATIENT
Start: 2025-01-10 | End: 2025-01-10 | Stop reason: HOSPADM

## 2025-01-10 RX ORDER — FLUMAZENIL 0.1 MG/ML
0.2 INJECTION, SOLUTION INTRAVENOUS
Status: DISCONTINUED | OUTPATIENT
Start: 2025-01-10 | End: 2025-01-10 | Stop reason: HOSPADM

## 2025-01-10 RX ORDER — IOPAMIDOL 755 MG/ML
INJECTION, SOLUTION INTRAVASCULAR
Status: DISCONTINUED | OUTPATIENT
Start: 2025-01-10 | End: 2025-01-10 | Stop reason: HOSPADM

## 2025-01-10 RX ORDER — LORAZEPAM 2 MG/ML
0.5 INJECTION INTRAMUSCULAR
Status: DISCONTINUED | OUTPATIENT
Start: 2025-01-10 | End: 2025-01-10 | Stop reason: HOSPADM

## 2025-01-10 RX ORDER — HYDROMORPHONE HCL IN WATER/PF 6 MG/30 ML
0.2 PATIENT CONTROLLED ANALGESIA SYRINGE INTRAVENOUS
Status: DISCONTINUED | OUTPATIENT
Start: 2025-01-10 | End: 2025-01-15 | Stop reason: HOSPADM

## 2025-01-10 RX ADMIN — ASPIRIN 325 MG ORAL TABLET 325 MG: 325 PILL ORAL at 09:53

## 2025-01-10 RX ADMIN — SODIUM BICARBONATE 650 MG TABLET 325 MG: at 09:59

## 2025-01-10 RX ADMIN — SODIUM CHLORIDE 75 ML/HR: 9 INJECTION, SOLUTION INTRAVENOUS at 14:29

## 2025-01-10 RX ADMIN — LEVOTHYROXINE SODIUM 137 MCG: 0.11 TABLET ORAL at 09:53

## 2025-01-10 RX ADMIN — MIRTAZAPINE 45 MG: 15 TABLET, FILM COATED ORAL at 21:53

## 2025-01-10 RX ADMIN — LATANOPROST 1 DROP: 50 SOLUTION OPHTHALMIC at 22:33

## 2025-01-10 RX ADMIN — HEPARIN SODIUM 1200 UNITS/HR: 10000 INJECTION, SOLUTION INTRAVENOUS at 22:00

## 2025-01-10 RX ADMIN — HEPARIN SODIUM 1200 UNITS/HR: 10000 INJECTION, SOLUTION INTRAVENOUS at 05:30

## 2025-01-10 RX ADMIN — SIMVASTATIN 20 MG: 20 TABLET, FILM COATED ORAL at 21:53

## 2025-01-10 RX ADMIN — ATENOLOL 50 MG: 25 TABLET ORAL at 09:53

## 2025-01-10 RX ADMIN — AMLODIPINE BESYLATE 5 MG: 5 TABLET ORAL at 09:53

## 2025-01-10 RX ADMIN — INSULIN GLARGINE 20 UNITS: 100 INJECTION, SOLUTION SUBCUTANEOUS at 23:39

## 2025-01-10 RX ADMIN — SODIUM CHLORIDE: 9 INJECTION, SOLUTION INTRAVENOUS at 09:14

## 2025-01-10 ASSESSMENT — ACTIVITIES OF DAILY LIVING (ADL)
ADLS_ACUITY_SCORE: 49
ADLS_ACUITY_SCORE: 60
ADLS_ACUITY_SCORE: 49
ADLS_ACUITY_SCORE: 60
ADLS_ACUITY_SCORE: 49
ADLS_ACUITY_SCORE: 51
ADLS_ACUITY_SCORE: 49
DEPENDENT_IADLS:: CLEANING;COOKING;LAUNDRY;SHOPPING;MEAL PREPARATION
ADLS_ACUITY_SCORE: 49

## 2025-01-10 NOTE — PROGRESS NOTES
Notified by Dr. Gallardo that the patient has severe 3 vessel disease and will need transfer to [facility] for CV surgery consultation re CABG.     Messaged hospitalist to notify of need to transfer.   Messaged [facility] rounding cardiology teams.    Jacqueline Holloway PA-C 1/10/2025 1:30 PM

## 2025-01-10 NOTE — Clinical Note
Max pressure = 14 nadira. Total duration = 12 seconds.     Max pressure = 16 nadira. Total duration = 10 seconds.    Balloon reinflated a second time: Max pressure = 16 nadira. Total duration = 10 seconds.  Balloon reinflated a third time: Max pressure = 16 nadira. Total duration = 8 seconds.  Balloon reinflated a fourth time: Max pressure = 16 nadira. Total duration = 6 seconds.  Balloon reinflated a fourth time: Max pressure = 16 nadira. Total duration  = 8 seconds.

## 2025-01-10 NOTE — PLAN OF CARE
Care from 19:00-07:30  Inpatient Progress Note - [facility]  For vital signs and complete assessments, please see documentation flowsheets.     ORIENTATION: Aox4.  VSS. Elevated BP when first arriving to [facility], improved with scheduled BP med in which timing was adjusted by xCover to give in the evening.  PAIN: Denies pain, CP, SOB, n/v.  O2: RA; had CPAP applied at HS but removed overnight.  TELE: SR  GI/: Ambulates to restroom.  ACTIVITY: A1 GB, walker; unsteady.  DIET: NPO at midnight.  LINES/DRAINS: L PIV infusing Heparin gtt at 1200ut/hr. One HepXa in range overnight. Next HepXA scheduled for 7:06am. R PIV SL  PROTOCOLS:  BG: Q4hr while NPO; 183, 130, 125  PLAN: Per cards note, plan for Angio today; patient was kept NPO overnight.    Goal Outcome Evaluation:      Plan of Care Reviewed With: patient    Overall Patient Progress: improvingOverall Patient Progress: improving    Outcome Evaluation: Pt reports no chest pain, on Heparin gtt; remains on RA. BG stable while NPO.    Problem: Adult Inpatient Plan of Care  Goal: Plan of Care Review  Description: The Plan of Care Review/Shift note should be completed every shift.  The Outcome Evaluation is a brief statement about your assessment that the patient is improving, declining, or no change.  This information will be displayed automatically on your shift  note.  1/10/2025 0332 by Haley Wilson RN  Outcome: Progressing  Flowsheets (Taken 1/10/2025 0332)  Outcome Evaluation:   Pt reports no chest pain, on Heparin gtt   remains on RA. BG stable while NPO.  1/10/2025 0332 by Haley Wilson RN  Outcome: Progressing  Flowsheets (Taken 1/10/2025 0332)  Outcome Evaluation:   Pt reports no chest pain, on Heparin gtt   remains on RA. BG stable while NPO.  Plan of Care Reviewed With: patient  Overall Patient Progress: improving  Goal: Patient-Specific Goal (Individualized)  Description: You can add care plan individualizations to a care plan. Examples of Individualization  "might be:  \"Parent requests to be called daily at 9am for status\", \"I have a hard time hearing out of my right ear\", or \"Do not touch me to wake me up as it startles  me\".  1/10/2025 0332 by Haley Wilson RN  Outcome: Progressing  1/10/2025 0332 by Haley Wilson RN  Outcome: Progressing  Goal: Absence of Hospital-Acquired Illness or Injury  1/10/2025 0332 by Haley Wilson RN  Outcome: Progressing  1/10/2025 0332 by Haley Wilson RN  Outcome: Progressing  Intervention: Identify and Manage Fall Risk  Recent Flowsheet Documentation  Taken 1/10/2025 0202 by Haley Wilson RN  Safety Promotion/Fall Prevention:   safety round/check completed   supervised activity   room organization consistent   room near nurse's station   room door open   patient and family education   nonskid shoes/slippers when out of bed   mobility aid in reach   lighting adjusted   increase visualization of patient   increased rounding and observation   clutter free environment maintained   assistive device/personal items within reach   activity supervised  Taken 1/9/2025 1956 by Haley Wilson RN  Safety Promotion/Fall Prevention:   safety round/check completed   supervised activity   room organization consistent   room near nurse's station   room door open   patient and family education   nonskid shoes/slippers when out of bed   mobility aid in reach   lighting adjusted   increase visualization of patient   increased rounding and observation   clutter free environment maintained   assistive device/personal items within reach   activity supervised  Intervention: Prevent Skin Injury  Recent Flowsheet Documentation  Taken 1/10/2025 0202 by Haley Wilson RN  Body Position: position changed independently  Taken 1/9/2025 2137 by Haley Wilson RN  Body Position: position changed independently  Taken 1/9/2025 1956 by Haley Wilson RN  Body Position: position changed independently  Intervention: Prevent and Manage VTE " (Venous Thromboembolism) Risk  Recent Flowsheet Documentation  Taken 1/10/2025 0202 by Haley Wilson RN  VTE Prevention/Management: (Heparin gtt)   SCDs off (sequential compression devices)   other (see comments)  Taken 1/9/2025 1956 by Haley Wilson RN  VTE Prevention/Management: (Heparin gtt)   SCDs off (sequential compression devices)   other (see comments)  Intervention: Prevent Infection  Recent Flowsheet Documentation  Taken 1/10/2025 0202 by Haley Wilson RN  Infection Prevention:   single patient room provided   rest/sleep promoted  Taken 1/9/2025 1956 by Haley Wilson RN  Infection Prevention:   single patient room provided   rest/sleep promoted  Goal: Optimal Comfort and Wellbeing  1/10/2025 0332 by Haley Wilson RN  Outcome: Progressing  1/10/2025 0332 by Haley Wilson RN  Outcome: Progressing  Goal: Readiness for Transition of Care  1/10/2025 0332 by Haley Wilson RN  Outcome: Progressing  1/10/2025 0332 by Haley Wilson RN  Outcome: Progressing  Intervention: Mutually Develop Transition Plan  Recent Flowsheet Documentation  Taken 1/9/2025 1953 by Haley Wilson RN  Equipment Currently Used at Home: walker, standard

## 2025-01-10 NOTE — Clinical Note
Max pressure = 8 nadira. Total duration = 8 seconds.     Max pressure = 16 nadira. Total duration = 7 seconds.    Balloon reinflated a second time: Max pressure = 16 nadira. Total duration = 7 seconds.  Balloon reinflated a third time:

## 2025-01-10 NOTE — PRE-PROCEDURE
GENERAL PRE-PROCEDURE:     Written consent obtained?: Yes    Risks and benefits: Risks, benefits and alternatives were discussed    Consent given by:  Patient  Patient states understanding of procedure being performed: Yes    Patient's understanding of procedure matches consent: Yes    Procedure consent matches procedure scheduled: Yes    Expected level of sedation:  Moderate  Appropriately NPO:  Yes  ASA Class:  3  Mallampati  :  Grade 4- soft palate obscured by base of tongue  Lungs:  Lungs clear with good breath sounds bilaterally  Heart:  Normal heart sounds and rate  History & Physical reviewed:  History and physical reviewed and no updates needed  Statement of review:  I have reviewed the lab findings, diagnostic data, medications, and the plan for sedation

## 2025-01-10 NOTE — Clinical Note
Max pressure = 16 nadira. Total duration = 12 seconds.     Max pressure = 18 nadira. Total duration = 7 seconds.    Balloon reinflated a second time: Max pressure = 18 nadira. Total duration = 7 seconds.  Balloon reinflated a third time: Max pressure = 18 nadira. Total duration = 6 seconds.  Balloon reinflated a fourth time: Max pressure = 20 nadira. Total duration = 4 seconds.  Balloon reinflated a fourth time:

## 2025-01-10 NOTE — PLAN OF CARE
A & O x4. A1 w\ GB and walker. Some tremors, weakness, and unsteady balance at baseline. Multiple falls in the last year.   Heparin discontinued. Angiogram done today, multivessel disease found. No stents have been placed. Transfer to [facility] and consult for potential CABG pending.   TR band removed, no bleeding. Site WDL.   Tele in place SR.           Goal Outcome Evaluation:      Plan of Care Reviewed With: patient    Overall Patient Progress: no changeOverall Patient Progress: no change    Outcome Evaluation: Angiogram done today, found multivessel disease. No stents placed.      Problem: Adult Inpatient Plan of Care  Goal: Plan of Care Review  Description: The Plan of Care Review/Shift note should be completed every shift.  The Outcome Evaluation is a brief statement about your assessment that the patient is improving, declining, or no change.  This information will be displayed automatically on your shift  note.  Outcome: Progressing  Flowsheets (Taken 1/10/2025 4449)  Outcome Evaluation: Angiogram done today, found multivessel disease. No stents placed.  Plan of Care Reviewed With: patient  Overall Patient Progress: no change  Goal: Absence of Hospital-Acquired Illness or Injury  Intervention: Identify and Manage Fall Risk  Recent Flowsheet Documentation  Taken 1/10/2025 0914 by Jerad Ballesteros RN  Safety Promotion/Fall Prevention: safety round/check completed     Problem: Fall Injury Risk  Goal: Absence of Fall and Fall-Related Injury  Intervention: Promote Injury-Free Environment  Recent Flowsheet Documentation  Taken 1/10/2025 0914 by Jerad Ballesteros RN  Safety Promotion/Fall Prevention: safety round/check completed

## 2025-01-10 NOTE — PROGRESS NOTES
[facility]    PROGRESS NOTE - Hospitalist Service    ASSESSMENT AND PLAN     Active Problems:    Unstable angina (H)    Abnormal stress test    NSTEMI (non-ST elevated myocardial infarction) (H)    Steve Morgan is a 64 year old male with PMH of T2DM, CKD3, morbid obesity, HTN, hypothyroidism, who was admitted on 1/9/2025 after an abnormal stress test.     NSTEMI  Presenting after an abnormal stress test   Family history of heart disease in his younger brother (recently had a double bypass) and grandfather.  Dobutamine stress echo shows normal EF. Baseline EKG with diffuse non specific T wave changes that normalized with dobutamine, but patient became more hypertensive. The mid LV cavity narrowed and small portion of apex and apical lateral wall may have become more hypokinetic that at rest. Post dobutamine echo suggest ongoing mild hypokinesis. Patient reported chest discomfort. Symptoms improved after IV metoprolol.   Troponin elevated   Cardiology planning angiogram today 1/10/25  - Heparin gtt  - ASA     T2DM  PTA on insulin glargine 34U, glipizide, metformin. Last A1c 7.2 on 1/6/25.   Patient sometimes gets low BG in the AM.  on arrival. Half glargine dose for 1/10 as he is NPO   - Glargine 15 + LDSSI    HTN  HLD  - Switched PTA simvastatin to atorvastatin  - Increased PTA atenolol to 50mg every day   - Continue PTA amlodipine, hold PTA losartan for angio     CKD3 - Cr at baseline of ~1.7  Morbid obesity - complicates cares  Hypothyroidism - PTA levothyroxine     Barriers to discharge: Cardio recs and angio     Anticipated length of stay: 1-2 days     Medically Ready for Discharge: Anticipated Tomorrow    Addendum 1:29 PM  Received message from cardiac team.  Angiogram revealed three-vessel disease.  Cardiologist requested I initiate transfer hospitalist to hospitalist to stop the hospital.  Cardiology stated they would notify their cardiology team and surgical teams at [facility] the  "hospital.  Transfer initiated.      Addendum 2:33 PM  Discussed case with hospitalist team at [facility].  They accept patient for transfer and patient will be placed on a transfer list. Updated cardiology team.  Discussed heparin drip with cardiology team.  They noted that heparin drip was not needed as the patient is not currently having any chest pain.    Clinically Significant Risk Factors Present on Admission         # Hyponatremia: Lowest Na = 134 mmol/L in last 2 days, will monitor as appropriate           # Hypertension: Noted on problem list      # Anemia: based on hgb <11      # DMII: A1C = 7.2 % (Ref range: 0.0 - 5.6 %) within past 6 months    # Obesity: Estimated body mass index is 38.91 kg/m  as calculated from the following:    Height as of this encounter: 1.651 m (5' 5\").    Weight as of this encounter: 106.1 kg (233 lb 12.8 oz).              Subjective:  Spent greater than 15 minutes in the room with patient answering questions about angiogram.  Patient notes he had a small twinge of chest discomfort when he walked to the bathroom this morning but no other chest pain or shortness of breath.  No other complaints.    PHYSICAL EXAM  Temp:  [97.2  F (36.2  C)-98.2  F (36.8  C)] 97.5  F (36.4  C)  Pulse:  [56-68] 58  Resp:  [12-27] 14  BP: (129-191)/(59-97) 159/77  FiO2 (%):  [21 %] 21 %  SpO2:  [94 %-100 %] 98 %  Wt Readings from Last 1 Encounters:   01/09/25 106.1 kg (233 lb 12.8 oz)       Intake/Output Summary (Last 24 hours) at 1/10/2025 1256  Last data filed at 1/10/2025 0916  Gross per 24 hour   Intake 258 ml   Output --   Net 258 ml      Body mass index is 38.91 kg/m .    GENRL: Alert and answering questions appropriately. Not in acute distress. Lying in bed   CHEST: Clear to auscultation   HEART: Regular rate   EXTRM: trace pedal edema. Missing tips of some fingers   NEURO: No focal neurological deficit. No involuntary movements. Normal mentation  PSYCH: Normal affect and mood.   INTGM: No " "skin rash    Medical Decision Making       55 MINUTES SPENT BY ME on the date of service doing chart review, history, exam, documentation & further activities per the note.      PERTINENT LABS/IMAGING:  Results for orders placed or performed during the hospital encounter of 01/09/25   XR Chest 2 Views    Impression    IMPRESSION:     Lungs are clear. No pleural effusions or pneumothorax. Normal pulmonary vascularity.    Normal cardiac silhouette.     Most Recent 3 CBC's:  Recent Labs   Lab Test 01/10/25  0737 01/09/25  1613 01/06/25  1139   WBC 5.7 5.4 6.3   HGB 10.5* 9.8* 10.2*   MCV 85 86 86    278 273     Most Recent 3 BMP's:  Recent Labs   Lab Test 01/10/25  0849 01/10/25  0737 01/10/25  0454 01/09/25  2101 01/09/25  1613 01/06/25  1139   NA  --  134*  --   --  135 129*   POTASSIUM  --  5.1  --   --  5.1 5.9*   CHLORIDE  --  100  --   --  102 98   CO2  --  20*  --   --  22 23   BUN  --  24.7*  --   --  23.7* 30.8*   CR  --  1.76*  --   --  1.70* 1.88*   ANIONGAP  --  14  --   --  11 8   CHAR  --  9.6  --   --  9.7 9.7   * 132* 125*   < > 165* 343*    < > = values in this interval not displayed.     Most Recent 2 LFT's:  Recent Labs   Lab Test 07/05/24  0815 05/14/24  1303   AST 24 25   ALT 19 27   ALKPHOS 76 72   BILITOTAL 0.2 0.2       Recent Labs   Lab Test 02/26/24  1145   CHOL 206*   HDL 26*   *   TRIG 252*     Recent Labs   Lab Test 02/26/24  1145 01/27/23  1126 01/08/23  0549   * 71 64     Recent Labs   Lab Test 01/10/25  0849 01/10/25  0737   NA  --  134*   POTASSIUM  --  5.1   CHLORIDE  --  100   CO2  --  20*   * 132*   BUN  --  24.7*   CR  --  1.76*   GFRESTIMATED  --  42*   CHAR  --  9.6     Recent Labs   Lab Test 01/06/25  1139 10/21/24  1320 07/09/24  1010   A1C 7.2* 7.9* 7.6*     Recent Labs   Lab Test 01/10/25  0737 01/09/25  1613 01/06/25  1139   HGB 10.5* 9.8* 10.2*     No results for input(s): \"TROPONINI\" in the last 42944 hours.  Recent Labs   Lab Test " "11/21/24  1458   NTBNP 76     Recent Labs   Lab Test 08/26/24  1402   TSH 2.37     No results for input(s): \"INR\" in the last 87928 hours.    Tonya Dietz, DO  Hospitalist Service  [facility]      "

## 2025-01-10 NOTE — Clinical Note
Max pressure = 12 nadira. Total duration = 11 seconds.     Max pressure = 12 nadira. Total duration = 7 seconds.    Balloon reinflated a second time: Max pressure = 12 nadira. Total duration = 7 seconds.  Balloon reinflated a third time:

## 2025-01-10 NOTE — PROGRESS NOTES
"Transfer Type: [facility]  Transfer Triage Note    Date of call: 01/10/25  Time of call: 2:28 PM    Current Patient Location:  [facility]   Current Level of Care: Med Surg    Vitals: Temp: 97.5  F (36.4  C) Temp src: Oral BP: (!) 145/72 Pulse: 52   Resp: 18 SpO2: 98 % Height: 165.1 cm (5' 5\") Weight: 106.1 kg (233 lb 12.8 oz)  O2 Device: None (Room air) at Oxygen Delivery: 3 LPM  FiO2 (%): 21 %  Diagnosis: NSTEMI with 3vD  Reason for requested transfer: Further diagnostic work up, management, and consultation for specialized care   Isolation Needs: Contact    Care everywhere has been updated and reviewed: N/A  Necessary images have been sent through PACS: N/A    If patient is transferring for specialty care or specific procedure, the specialist required has participated in the transfer call and agreed with need for transfer and anticipated timeline: No    Transfer accepted: Yes  Stability of Patient: Patient is at risk for instability, however patient requires urgent transfer and does not meet ICU criteria   Is the patient appropriate for [facility]? No, What specific [place] needs are anticipated? CT surgery / Cardiology   Level of Care Needed: IMC  Telemetry Needed:  Cardiac Telemetry  Expected Time of Arrival for Transfer: 8-24 hours  Arrival Location:  [facility]     Recommendations for Management and Stabilization: Given    Additional Comments:     65 yo M with PMH of Diabetes mellitus type 2, Hypertension, HLD, CKD3  Presented after abnormal stress test for angiogram. Underwent angio 1/10/2025 which showed 3vD. Cardiology team at [facility] recommending transfer to [facility] for multidisciplinary plan of care with cardiology and CT surgery for consideration for possible CABG vs PCI.     Currently is hemodynamically stable, no exertional angina, no arrhythmia on telemetry.     Accepted to IMC/Cardiac tele at [facility]. Will need CT surgery consultation and cardiology follow up " on presentation.   - Recommended that need for heparin gtt be clarified and gtt resumed if deemed appropriate by cardiology at [facility] ( was held for angiogram)   - Please update if change in clinical status, arrhythmias or worsening/recurrent angina     Goyo Crabtree MD

## 2025-01-10 NOTE — Clinical Note
Max pressure = 16 nadira. Total duration = 9 seconds.     Max pressure = 18 nadira. Total duration = 11 seconds.    Balloon reinflated a second time: Max pressure = 18 nadira. Total duration = 11 seconds.  Balloon reinflated a third time: Max pressure = 13 nadira. Total duration = 6 seconds.  Balloon reinflated a fourth time:

## 2025-01-10 NOTE — PROGRESS NOTES
An auto titration CPAP 5-20 was applied to the pt via the nasal mask for night time use. The skin in contact with the nasal mask and straps looks good and intact. Pt is tolerating it well. RT will continue to monitor.     Dina Lim,  on 1/9/2025 at 9:50 PM

## 2025-01-10 NOTE — Clinical Note
Max pressure = 12 nadira. Total duration = 8 seconds.     Max pressure = 12 nadira. Total duration = 7 seconds.    Balloon reinflated a second time: Max pressure = 12 nadira. Total duration = 7 seconds.  Balloon reinflated a third time:

## 2025-01-10 NOTE — CONSULTS
Care Management Initial Consult    General Information  Assessment completed with: Patient, Caio  Type of CM/SW Visit: Initial Assessment    Primary Care Provider verified and updated as needed: Yes   Readmission within the last 30 days: no previous admission in last 30 days      Reason for Consult: discharge planning, care coordination/care conference  Advance Care Planning:            Communication Assessment  Patient's communication style: spoken language (English or Bilingual)    Hearing Difficulty or Deaf: no   Wear Glasses or Blind: yes    Cognitive  Cognitive/Neuro/Behavioral: WDL  Level of Consciousness: alert  Arousal Level: opens eyes spontaneously  Orientation: oriented x 4  Mood/Behavior: calm, cooperative  Best Language: 0 - No aphasia  Speech: logical, spontaneous, clear    Living Environment:   People in home: spouse     Current living Arrangements: house      Able to return to prior arrangements: yes       Family/Social Support:  Care provided by: self, spouse/significant other  Provides care for: no one  Marital Status:   Support system: Wife, Children          Description of Support System: Involved, Supportive    Support Assessment: Adequate social supports    Current Resources:   Patient receiving home care services: No        Community Resources: None  Equipment currently used at home: walker, rolling, shower chair, cane, straight  Supplies currently used at home: Diabetic Supplies    Employment/Financial:  Employment Status:          Financial Concerns:             Does the patient's insurance plan have a 3 day qualifying hospital stay waiver?  Yes     Which insurance plan 3 day waiver is available? [employer]    Will the waiver be used for post-acute placement? No    Lifestyle & Psychosocial Needs:  Social Drivers of Health     Food Insecurity: Low Risk  (1/9/2025)    Food Insecurity     Within the past 12 months, did you worry that your food would run out before you got money to buy  more?: No     Within the past 12 months, did the food you bought just not last and you didn t have money to get more?: No   Depression: Not at risk (1/6/2025)    PHQ-2     PHQ-2 Score: 2   Recent Concern: Depression - At risk (11/21/2024)    PHQ-2     PHQ-2 Score: 4   Housing Stability: Low Risk  (1/9/2025)    Housing Stability     Do you have housing? : Yes     Are you worried about losing your housing?: No   Tobacco Use: Low Risk  (1/6/2025)    Patient History     Smoking Tobacco Use: Never     Smokeless Tobacco Use: Never     Passive Exposure: Not on file   Financial Resource Strain: Low Risk  (1/9/2025)    Financial Resource Strain     Within the past 12 months, have you or your family members you live with been unable to get utilities (heat, electricity) when it was really needed?: No   Alcohol Use: Not At Risk (6/25/2023)    AUDIT-C     Frequency of Alcohol Consumption: Monthly or less     Average Number of Drinks: Patient does not drink     Frequency of Binge Drinking: Never   Transportation Needs: Low Risk  (1/9/2025)    Transportation Needs     Within the past 12 months, has lack of transportation kept you from medical appointments, getting your medicines, non-medical meetings or appointments, work, or from getting things that you need?: No   Physical Activity: Inactive (7/5/2024)    Exercise Vital Sign     Days of Exercise per Week: 0 days     Minutes of Exercise per Session: 0 min   Interpersonal Safety: Low Risk  (1/9/2025)    Interpersonal Safety     Do you feel physically and emotionally safe where you currently live?: Yes     Within the past 12 months, have you been hit, slapped, kicked or otherwise physically hurt by someone?: No     Within the past 12 months, have you been humiliated or emotionally abused in other ways by your partner or ex-partner?: No   Stress: No Stress Concern Present (7/5/2024)    [employer] [place] [employer] - Occupational Stress Questionnaire     Feeling of Stress  : Only a little   Social Connections: Socially Integrated (2/8/2024)    [facility] from [facility], [facility]    Social Connections     Frequency of Communication with Friends and Family: 0   Health Literacy: Not on file       Functional Status:  Prior to admission patient needed assistance:   Dependent ADLs:: Independent, Ambulation-cane, Ambulation-walker  Dependent IADLs:: Cleaning, Cooking, Laundry, Shopping, Meal Preparation  Assesssment of Functional Status: At functional baseline    Mental Health Status:          Chemical Dependency Status:                Values/Beliefs:  Spiritual, Cultural Beliefs, Taoism Practices, Values that affect care:                 Discussed  Partnership in Safe Discharge Planning  document with patient/family: No    Additional Information:  Patient admitted for abnormal stress test- NSTEMI. He is followed by Cardiology and the Hospitalist.  CM consulted for High Unplanned Readmission Risk of 20%. He lives in a house with his wife. He is independent in ADL's but receives assistance with cooking, cleaning, laundry,. Transportation via his family.  Patient had chosen Sindhu Bolaños NP for PCP but had not made appointment yet. [facility] set to make appointment for 7-10 days. Patient requests a Mon or Fri.       Next Steps: At this time no further needs identified. Will monitor for further needs this stay.    Yee Joyce, RN, BSN, CM  Inpatient Care Coordination  [facility]  [phone]

## 2025-01-10 NOTE — PROVIDER NOTIFICATION
Notified provider:     patients HR is sitting at 48-50 bpm. His HR has been in the 50s but has been dropping to high 40s consistently

## 2025-01-10 NOTE — Clinical Note
Max pressure = 12 nadira. Total duration = 18 seconds.     Max pressure = 12 nadira. Total duration = 13 seconds.    Balloon reinflated a second time: Max pressure = 12 nadira. Total duration = 13 seconds.  Balloon reinflated a third time:

## 2025-01-11 LAB
ANION GAP SERPL CALCULATED.3IONS-SCNC: 8 MMOL/L (ref 7–15)
BUN SERPL-MCNC: 23 MG/DL (ref 8–23)
CALCIUM SERPL-MCNC: 9.5 MG/DL (ref 8.8–10.4)
CHLORIDE SERPL-SCNC: 101 MMOL/L (ref 98–107)
CREAT SERPL-MCNC: 1.88 MG/DL (ref 0.67–1.17)
EGFRCR SERPLBLD CKD-EPI 2021: 39 ML/MIN/1.73M2
ERYTHROCYTE [DISTWIDTH] IN BLOOD BY AUTOMATED COUNT: 13.3 % (ref 10–15)
GLUCOSE BLDC GLUCOMTR-MCNC: 102 MG/DL (ref 70–99)
GLUCOSE BLDC GLUCOMTR-MCNC: 174 MG/DL (ref 70–99)
GLUCOSE BLDC GLUCOMTR-MCNC: 205 MG/DL (ref 70–99)
GLUCOSE BLDC GLUCOMTR-MCNC: 237 MG/DL (ref 70–99)
GLUCOSE BLDC GLUCOMTR-MCNC: 90 MG/DL (ref 70–99)
GLUCOSE SERPL-MCNC: 87 MG/DL (ref 70–99)
HCO3 SERPL-SCNC: 25 MMOL/L (ref 22–29)
HCT VFR BLD AUTO: 28.9 % (ref 40–53)
HGB BLD-MCNC: 9.8 G/DL (ref 13.3–17.7)
MCH RBC QN AUTO: 29.3 PG (ref 26.5–33)
MCHC RBC AUTO-ENTMCNC: 33.9 G/DL (ref 31.5–36.5)
MCV RBC AUTO: 87 FL (ref 78–100)
PLATELET # BLD AUTO: 269 10E3/UL (ref 150–450)
POTASSIUM SERPL-SCNC: 4.9 MMOL/L (ref 3.4–5.3)
RBC # BLD AUTO: 3.34 10E6/UL (ref 4.4–5.9)
SODIUM SERPL-SCNC: 134 MMOL/L (ref 135–145)
UFH PPP CHRO-ACNC: 0.12 IU/ML
UFH PPP CHRO-ACNC: 0.36 IU/ML
UFH PPP CHRO-ACNC: 0.53 IU/ML
WBC # BLD AUTO: 6.5 10E3/UL (ref 4–11)

## 2025-01-11 PROCEDURE — 85520 HEPARIN ASSAY: CPT | Performed by: INTERNAL MEDICINE

## 2025-01-11 PROCEDURE — 82435 ASSAY OF BLOOD CHLORIDE: CPT | Performed by: INTERNAL MEDICINE

## 2025-01-11 PROCEDURE — 36415 COLL VENOUS BLD VENIPUNCTURE: CPT | Performed by: INTERNAL MEDICINE

## 2025-01-11 PROCEDURE — 85014 HEMATOCRIT: CPT | Performed by: INTERNAL MEDICINE

## 2025-01-11 PROCEDURE — 99223 1ST HOSP IP/OBS HIGH 75: CPT | Performed by: INTERNAL MEDICINE

## 2025-01-11 PROCEDURE — 210N000002 HC R&B HEART CARE

## 2025-01-11 PROCEDURE — 99233 SBSQ HOSP IP/OBS HIGH 50: CPT | Performed by: INTERNAL MEDICINE

## 2025-01-11 PROCEDURE — 250N000011 HC RX IP 250 OP 636: Performed by: INTERNAL MEDICINE

## 2025-01-11 PROCEDURE — 250N000012 HC RX MED GY IP 250 OP 636 PS 637: Performed by: INTERNAL MEDICINE

## 2025-01-11 PROCEDURE — 80048 BASIC METABOLIC PNL TOTAL CA: CPT | Performed by: INTERNAL MEDICINE

## 2025-01-11 PROCEDURE — 250N000013 HC RX MED GY IP 250 OP 250 PS 637: Performed by: INTERNAL MEDICINE

## 2025-01-11 PROCEDURE — 82565 ASSAY OF CREATININE: CPT | Performed by: INTERNAL MEDICINE

## 2025-01-11 PROCEDURE — 85041 AUTOMATED RBC COUNT: CPT | Performed by: INTERNAL MEDICINE

## 2025-01-11 RX ORDER — ASPIRIN 81 MG/1
81 TABLET ORAL DAILY
Status: DISCONTINUED | OUTPATIENT
Start: 2025-01-12 | End: 2025-01-15

## 2025-01-11 RX ADMIN — VILAZODONE HYDROCHLORIDE 20 MG: 20 TABLET ORAL at 21:13

## 2025-01-11 RX ADMIN — AMLODIPINE BESYLATE 5 MG: 5 TABLET ORAL at 13:46

## 2025-01-11 RX ADMIN — INSULIN ASPART 2 UNITS: 100 INJECTION, SOLUTION INTRAVENOUS; SUBCUTANEOUS at 18:02

## 2025-01-11 RX ADMIN — LEVOTHYROXINE SODIUM 137 MCG: 112 TABLET ORAL at 06:02

## 2025-01-11 RX ADMIN — SODIUM BICARBONATE 325 MG: 325 TABLET ORAL at 08:57

## 2025-01-11 RX ADMIN — FINASTERIDE 5 MG: 5 TABLET, FILM COATED ORAL at 08:57

## 2025-01-11 RX ADMIN — LATANOPROST 1 DROP: 50 SOLUTION OPHTHALMIC at 21:41

## 2025-01-11 RX ADMIN — SIMVASTATIN 20 MG: 20 TABLET, FILM COATED ORAL at 21:13

## 2025-01-11 RX ADMIN — MIRTAZAPINE 45 MG: 15 TABLET, FILM COATED ORAL at 21:12

## 2025-01-11 RX ADMIN — ASPIRIN 325 MG: 325 TABLET, COATED ORAL at 08:57

## 2025-01-11 RX ADMIN — INSULIN ASPART 2 UNITS: 100 INJECTION, SOLUTION INTRAVENOUS; SUBCUTANEOUS at 13:46

## 2025-01-11 RX ADMIN — LOSARTAN POTASSIUM 100 MG: 100 TABLET, FILM COATED ORAL at 08:57

## 2025-01-11 RX ADMIN — HEPARIN SODIUM 1150 UNITS/HR: 10000 INJECTION, SOLUTION INTRAVENOUS at 12:25

## 2025-01-11 RX ADMIN — SODIUM BICARBONATE 325 MG: 325 TABLET ORAL at 21:13

## 2025-01-11 ASSESSMENT — ACTIVITIES OF DAILY LIVING (ADL)
ADLS_ACUITY_SCORE: 59
ADLS_ACUITY_SCORE: 60
ADLS_ACUITY_SCORE: 59
DEPENDENT_IADLS:: CLEANING;COOKING;LAUNDRY;SHOPPING;MEAL PREPARATION
ADLS_ACUITY_SCORE: 59

## 2025-01-11 NOTE — PLAN OF CARE
[facility] Shift Note    Orientation: Ax4  Vitals: VSS, denied chest pain this shift.  Tele: SB  Lines/Drains: Heparin infusing re-check at 11:30 am.  Skin/Wounds: Radial site Ecchomatic, good radial pulse, baseline neuropathy unchanged.   GI/: WDL  Ambulation/Assist: with standby assist w/ gt belt and walker.  Plan: Consult with [facility] and Cards.    Jeff Mensah RN

## 2025-01-11 NOTE — PHARMACY-ADMISSION MEDICATION HISTORY
Admission medication history completed at [facility]. Please see Pharmacist Admission Medication History note from 1/9/2024.

## 2025-01-11 NOTE — PLAN OF CARE
Goal Outcome Evaluation:      Plan of Care Reviewed With: patient    Overall Patient Progress: improvingOverall Patient Progress: improving    Outcome Evaluation: Pt will discharge home with family when medically stable.

## 2025-01-11 NOTE — PROGRESS NOTES
Cardiology progress note  He has severe 3 vessel disease and given diabetes, preferred option is consultation for CAB. Patient understands and is agreeable to transfer. The access site looks fine. Long term follow up with Dr.Foster Butler.  Melvin

## 2025-01-11 NOTE — PLAN OF CARE
Care plan note:      Recent Vitals:  Temp: 98.1  F (36.7  C) Temp src: Oral BP: 134/53 Pulse: 61   Resp: 20 SpO2: 97 % O2 Device: None (Room air)      Orientation/Neuro: Alert and Oriented x 4  Pain: The patient is not having any pain.   Tele: Sinus rhythm    IV medications: Heparin, next 10a at 1800   Mobility: St. by assist, Gait belt, and Walker   Skin: Radial site: rt radial CDI, slightly ecchymotic, good pulse , baseline neuropathy   Resp: RA  GI: WDL  : WDL     Diet: Tolerating diet:   Well  Orders Placed This Encounter      Combination Diet Moderate Consistent Carb (60 g CHO per Meal) Diet; Low Saturated Fat Na <2400mg Diet, No Caffeine Diet      Safety/Concerns:  Fall Risk and Isolation precautions  Aggression Color: Green    Plan: Hospitalist following. Cards consulted and waiting on [facility] consult. No chest pain this shift. Blood sugar stable.    Continue to monitor.      Sri Paz RN

## 2025-01-11 NOTE — PROGRESS NOTES
Pt is A&Ox4, VSS on RA. On TELE. Continent uses the BR to void with 1 PA/ W.  Has bandage on the R wrist for Angiogram procedure, CDI. L PIV infusing Heparin gtt @ 1200/hr. Denies pain, no SOB. On BG checks. Noted baseline neuropathy. On CV consult

## 2025-01-11 NOTE — H&P
[facility]    History and Physical - Hospitalist Service       Date of Admission:  1/10/2025     Assessment & Plan      Steve Morgan is a 64 year old male with a history of Htn, Hld, DM2, DAMIÁN, hypothyroidism who is admitted on 1/10/2025 with NSTEMI and multivessel disease requiring CV surgery consult.     NSTEMI  Severe 3V CAD  Hypertension  Dyslipidemia  Patient had abnormal dobutamine stress test on 1/9 and angiogram on 1/10 showing diffuse 3 vessel disease for which CV surgery consult was recommended. He has had progressive symptoms recently including worsening exertional chest pain and occasionally some pain at rest though he seems comfortable on admission. Given his need for hospitalization, progressive symptoms and severe 3V disease I will restart heparin. Baseline echo for dobutamine stress shows normal EF with borderline hypokinesis of the septum/apex  -continue heparin drip  - mg daily  -continue metoprolol, amlodipine and zocor  -CV surgery consult  -cardiology consult  -prn sublingual nitroglycerin available  -cardiac monitoring  -defer to cardiology if TTE required    DM2 neuropathy and nephropathy  Hgb A1C of 7.2 a week PTA. Last blood sugar was 87 earlier this evening.   -check blood sugar now  -give lantus based upon this value, may give typical home dose of 35 or decrease  -sliding scale  -hold metformin and glipizide for now  -continue gabapentin    CKD stage IIIb  Baseline creatinine appears to be around 1.7  -monitor    Hypothyroidism  -continue PTA levothyroxine    BPH  -continue PTA proscar    Chronic anemia  Baseline hgb around 10  -monitor while on heparin       Diet: Combination Diet Moderate Consistent Carb (60 g CHO per Meal) Diet; Low Saturated Fat Na <2400mg Diet, No Caffeine Diet  DVT Prophylaxis: heparin  Code Status: Full Code    Disposition: pending CV surgery consult and potential surgery  Medically Ready for Discharge: Anticipated in 2-4  "Days        Clinically Significant Risk Factors Present on Admission         # Hyponatremia: Lowest Na = 134 mmol/L in last 2 days, will monitor as appropriate             # Hypertension: Noted on problem list      # Anemia: based on hgb <11    # DMII: A1C = 7.2 % (Ref range: 0.0 - 5.6 %) within past 6 months      # Obesity: Estimated body mass index is 38.91 kg/m  as calculated from the following:    Height as of 1/9/25: 1.651 m (5' 5\").    Weight as of 1/9/25: 106.1 kg (233 lb 12.8 oz).               Lorenzo Don, DO  Hospitalist Service  [facility]  Securely message with [employer] (more info)  Text page via [employer] Paging/Directory     ______________________________________________________________________    Chief Complaint   Need for CV surgery, chest pain    History is obtained from the patient and chart review    History of Present Illness   Steve Morgan is a 64 year old male who has a history of Htn, Hld, CAD, DM2, hypothyroidism, depression who presents to the ED at [facility] on 1/9 with concerns of chest pain and abnormal stress test. Patient notes that his symptoms date back several months and typically come on with exertion when he is outside. He notes that he will have a tightness in his chest, start coughing and then have a more persistent pain in his breastbone that can last up to 10 minutes now though previously was only for a few minutes. He notes that the symptoms are becoming more frequent and with less exertion. Recently he has even had a few episodes where he will wake up from sleep with chest symptoms. He was admitted on 1/9 after his abnormal stress test and started on heparin for NSTEMI. He underwent angiogram on 1/10 which showed diffuse 3V disease with up to 90% stenosis in places. Given the severity of his disease he was transferred to [facility] for CV surgery consult. He notes that he feels well currently while resting in [place] [facility]. He denies any current chest " pain, nausea, diaphoresis.       Past Medical History    Past Medical History:   Diagnosis Date    BPH (benign prostatic hyperplasia)     Cellulitis and abscess of trunk 06/27/2017    Depression     Depressive disorder     Diverticular disease of colon     GERD (gastroesophageal reflux disease)     Hyperlipidemia LDL goal <100 10/31/2010    Hypertension goal BP (blood pressure) < 140/90 03/17/2011    Hypothyroidism 01/12/2010    Morbid obesity due to excess calories (H) 01/12/2010    Neuropathy in diabetes (H) 01/12/2010    Obesity 01/12/2010    PVD (peripheral vascular disease)     Sleep apnea 01/12/2010    CPAP    Type 2 diabetes, HbA1C goal < 8% (H) 03/08/2011    Vitamin B12 deficiency (non anemic)        Past Surgical History   Past Surgical History:   Procedure Laterality Date    BIOPSY      BURSECTOMY KNEE Right 05/20/2024    Procedure: Excisional prepatellar bursectomy, right knee;  Surgeon: Justin Bo MD;  Location:  OR    COLONOSCOPY      COSMETIC SURGERY      CV CORONARY ANGIOGRAM N/A 1/10/2025    Procedure: Coronary Angiogram;  Surgeon: Magan Gallardo MD;  Location:  HEART CARDIAC CATH LAB    EYE SURGERY Bilateral     Catracts    GENITOURINARY SURGERY      surg for undescended testicle    IRRIGATION AND DEBRIDEMENT HAND, COMBINED Right 12/23/2022    Procedure: Right long finger irrigation and debridement with partial amputation of the right long finger. ;  Surgeon: Colby English MD;  Location: [facility] OR    REPAIR HAMMER TOE BILATERAL  05/16/2013    Procedure: REPAIR HAMMER TOE BILATERAL;  Flexor Tenotomy Toes 2,3,4,5 Bilateral Feet;  Surgeon: Saad Bangura DPM;  Location:  OR       Prior to Admission Medications   Prior to Admission Medications   Prescriptions Last Dose Informant Patient Reported? Taking?   Calcium Carb-Cholecalciferol (CALCIUM 600 + D PO)   Yes Yes   Sig: Take 1 tablet by mouth daily   Continuous Glucose Sensor (FREESTYLE GIULIANA 2 SENSOR) MISC   No Yes    Sig: Inject 1 each subcutaneously every 14 days. Use 1 sensor every 14 days. Use to read blood sugars per 's instructions.   LEVOXYL 137 MCG tablet   No Yes   Sig: Take 1 tablet (137 mcg) by mouth daily.   MULTI-VITAMIN OR TABS   Yes Yes   Sig: Take 1 tablet by mouth daily   acetaminophen (TYLENOL) 325 MG tablet   No Yes   Sig: Take 2 tablets (650 mg) by mouth every 4 hours as needed for other (For optimal non-opioid multimodal pain management to improve pain control.)   amLODIPine (NORVASC) 5 MG tablet   Yes Yes   Sig: Take 1 tablet by mouth daily at 2 pm.   finasteride (PROSCAR) 5 MG tablet   No Yes   Sig: Take 1 tablet (5 mg) by mouth daily.   gabapentin (NEURONTIN) 300 MG capsule   No Yes   Sig: Take 1 capsule (300 mg) by mouth 3 times daily   Patient taking differently: Take 300 mg by mouth as needed.   glipiZIDE (GLUCOTROL XL) 10 MG 24 hr tablet   No Yes   Sig: TAKE 1 TABLET (10 MG) BY MOUTH DAILY.   insulin glargine 100 UNIT/ML pen   No Yes   Sig: Inject 35 Units subcutaneously daily. DOSE CHANGE   insulin pen needle (B-D U/F) 31G X 5 MM miscellaneous   No Yes   Sig: USE 1 DAILY OR AS DIRECTED.   latanoprost (XALATAN) 0.005 % ophthalmic solution   Yes Yes   Sig: INSTILL 1 DROP INTO BOTH EYES IN THE EVENING   losartan (COZAAR) 100 MG tablet   No Yes   Sig: Take 1 tablet (100 mg) by mouth daily.   metFORMIN (GLUCOPHAGE) 1000 MG tablet   No Yes   Sig: Take 1 tablet (1,000 mg) by mouth 2 times daily (with meals).   mirtazapine (REMERON) 45 MG tablet   No Yes   Sig: Take 1 tablet (45 mg) by mouth at bedtime.   senna-docusate (SENOKOT-S/PERICOLACE) 8.6-50 MG tablet   No Yes   Sig: Take 1 tablet by mouth 2 times daily as needed for constipation   simvastatin (ZOCOR) 20 MG tablet   Yes Yes   Sig: Take 20 mg by mouth daily   sodium bicarbonate 325 MG tablet   Yes Yes   Sig: Take 325 mg by mouth 2 times daily.   vilazodone (VIIBRYD) 20 MG TABS tablet   No Yes   Sig: Take 1 tablet (20 mg) by mouth daily.       Facility-Administered Medications: None        Review of Systems    The 10 point Review of Systems is negative other than noted in the HPI or here.      Physical Exam   Vital Signs: Temp: 98.1  F (36.7  C) Temp src: Oral BP: (!) 176/85 Pulse: 67   Resp: 18        Weight: 0 lbs 0 oz    Gen: lying in bed, appears comfortable  CV: RRR, no m/r/g  Pulm: CTAB, no wheeze or rhonchi  GI: +BS, soft, NT/ND  Lymph: no edema      Medical Decision Making             Data     I have personally reviewed the following data over the past 24 hrs:    5.7  \   10.5 (L)   / 292     134 (L) 100 24.7 (H) /  87   5.1 20 (L) 1.76 (H) \       Imaging results reviewed over the past 24 hrs:   Recent Results (from the past 24 hours)   Cardiac Catheterization    Narrative    Extensive multivessel CAD as described below.  Unfortunately, majority of   disease is distal vessel, with generally suboptimal graft targets.  LM: No LM; LAD and LCx arise from separate aortic ostia  LAD: Small caliber vessel with moderate ostial disease (potentially   exacerbated by catheter induced spasm), with severe diffuse coronary   disease throughout the mid-distal LAD, diagonal branches, and septal   branches  LCx: Small caliber vessel with only a single moderate-sized OM branch   (1.5-2.0 mm).  The OM branch has a 90% stenosis proximally.  RCA: This is the dominant vessel and is relatively healthy within the RCA   itself other than a moderate focal mid RCA stenosis.  However, the RPDA   has a 90% proximal stenosis with moderate diffuse disease elsewhere.  The   RPL system is relatively healthy mild-moderate diffuse disease.      Recommend transfer to [facility] for multidisciplinary   evaluation regarding revascularization (CABG versus PCI).  Given extensive   coronary disease, CABG would typically be preferred, but unfortunately   distal graft targets (particularly within the LAD) are very poor in this   case.

## 2025-01-11 NOTE — PROGRESS NOTES
[facility]    Medicine Progress Note - Hospitalist Service    Date of Admission:  1/10/2025    Assessment & Plan   Steve Morgan is a 64 year old male with a history of Htn, Hld, DM2, DAMIÁN, hypothyroidism who is admitted on 1/10/2025 with NSTEMI and multivessel disease requiring CV surgery consult.      NSTEMI  Severe 3V CAD  Hypertension  Dyslipidemia  Patient had abnormal dobutamine stress test on 1/9 and angiogram on 1/10 showing diffuse 3 vessel disease for which CV surgery consult was recommended. He has had progressive symptoms recently including worsening exertional chest pain and occasionally some pain at rest though he seems comfortable on admission. Given his need for hospitalization, progressive symptoms and severe 3V disease I will restart heparin. Baseline echo for dobutamine stress shows normal EF with borderline hypokinesis of the septum/apex  -continue heparin drip  - mg daily  -continue metoprolol, amlodipine and zocor  -Await CV surgery consult  -Appreciate cardiology consult  -prn sublingual nitroglycerin available  -cardiac monitoring     DM2 neuropathy and nephropathy  Hgb A1C of 7.2 a week PTA.   -   Recent Labs   Lab 01/11/25  0822 01/11/25  0354 01/11/25  0201 01/10/25  2204 01/10/25  1716 01/10/25  0849   GLC 90 87 102* 177* 87 137*     -Decreased Lantus 20 units at bedtime(PTA 35 units)  -hold metformin and glipizide for now  -continue gabapentin     CKD stage IIIb  Baseline creatinine appears to be around 1.7  -monitor     Hypothyroidism  -continue PTA levothyroxine     BPH  -continue PTA proscar     Chronic anemia  Baseline hgb around 10  -monitor while on heparin        Diet: Combination Diet Moderate Consistent Carb (60 g CHO per Meal) Diet; Low Saturated Fat Na <2400mg Diet, No Caffeine Diet    DVT Prophylaxis: heparin  [place] Catheter: Not present  Lines: None     Cardiac Monitoring: ACTIVE order. Indication: AMI (NSTEMI/ STEMI) (48 hours)  Code  "Status: Full Code      Clinically Significant Risk Factors Present on Admission         # Hyponatremia: Lowest Na = 134 mmol/L in last 2 days, will monitor as appropriate           # Hypertension: Noted on problem list      # Anemia: based on hgb <11      # DMII: A1C = 7.2 % (Ref range: 0.0 - 5.6 %) within past 6 months    # Obesity: Estimated body mass index is 38.11 kg/m  as calculated from the following:    Height as of 1/9/25: 1.651 m (5' 5\").    Weight as of this encounter: 103.9 kg (229 lb).              Social Drivers of Health    Depression: Not at risk (1/6/2025)    PHQ-2     PHQ-2 Score: 2   Recent Concern: Depression - At risk (11/21/2024)    PHQ-2     PHQ-2 Score: 4   Physical Activity: Inactive (7/5/2024)    Exercise Vital Sign     Days of Exercise per Week: 0 days     Minutes of Exercise per Session: 0 min          Disposition Plan     Medically Ready for Discharge: Anticipated in 2-4 Days         Brett Isidro MD  Hospitalist Service  [facility]  Securely message with [employer] (more info)  Text page via [employer] Paging/Directory   \"This dictation was performed with voice recognition software and may contain errors,  omissions and inadvertent word substitution.\"    ______________________________________________________________________    Interval History   History reviewed.  Denies any chest pain/palpitations or shortness of breath.    Physical Exam   /64 (BP Location: Right arm)   Pulse 52   Temp 98.1  F (36.7  C) (Oral)   Resp 20   Wt 103.9 kg (229 lb)   SpO2 92%   BMI 38.11 kg/m    Gen- pleasant   Neck- supple  CVS- I+II+ no m/r/g  RS- CTABS  Abdo- soft, no tenderness . No g/r/r  Ext- no edema       Medical Decision Making       51 MINUTES SPENT BY ME on the date of service doing chart review, history, exam, documentation & further activities per the note.  Review of cardiology note, previous angio and echo reports    Data   ------------------------- PAST 24 HR DATA " REVIEWED -----------------------------------------------    I have personally reviewed the following data over the past 24 hrs:    6.5  \   9.8 (L)   / 269     134 (L) 101 23.0 /  90   4.9 25 1.88 (H) \       Imaging results reviewed over the past 24 hrs:   Recent Results (from the past 24 hours)   Cardiac Catheterization    Narrative    Extensive multivessel CAD as described below.  Unfortunately, majority of   disease is distal vessel, with generally suboptimal graft targets.  LM: No LM; LAD and LCx arise from separate aortic ostia  LAD: Small caliber vessel with moderate ostial disease (potentially   exacerbated by catheter induced spasm), with severe diffuse coronary   disease throughout the mid-distal LAD, diagonal branches, and septal   branches  LCx: Small caliber vessel with only a single moderate-sized OM branch   (1.5-2.0 mm).  The OM branch has a 90% stenosis proximally.  RCA: This is the dominant vessel and is relatively healthy within the RCA   itself other than a moderate focal mid RCA stenosis.  However, the RPDA   has a 90% proximal stenosis with moderate diffuse disease elsewhere.  The   RPL system is relatively healthy mild-moderate diffuse disease.      Recommend transfer to [facility] for multidisciplinary   evaluation regarding revascularization (CABG versus PCI).  Given extensive   coronary disease, CABG would typically be preferred, but unfortunately   distal graft targets (particularly within the LAD) are very poor in this   case.

## 2025-01-11 NOTE — PROGRESS NOTES
Pt AO x4. VSS. Denies pain. Agreeable to transfer to [facility] Room 250, notified pt wife. Report given to receiving RN. Pt belongings sent with. Pt OOB on transport 20:05.

## 2025-01-11 NOTE — CONSULTS
Care Management Initial Consult    General Information  Assessment completed with: Caio Adame  Type of CM/SW Visit: Initial Assessment    Primary Care Provider verified and updated as needed: Yes   Readmission within the last 30 days: no previous admission in last 30 days      Reason for Consult: other (see comments) (elevated risk)  Advance Care Planning: Advance Care Planning Reviewed: no concerns identified          Communication Assessment  Patient's communication style: spoken language (English or Bilingual)             Cognitive  Cognitive/Neuro/Behavioral: WDL  Level of Consciousness: alert  Arousal Level: opens eyes spontaneously  Orientation: oriented x 4  Mood/Behavior: calm, cooperative  Best Language: 0 - No aphasia  Speech: logical, spontaneous, clear    Living Environment:   People in home: child(roverto), adult, spouse  SriCaio Tyler  Current living Arrangements: house      Able to return to prior arrangements: yes       Family/Social Support:  Care provided by: self, spouse/significant other  Provides care for: no one  Marital Status:   Support system: Children, Wife  Sri       Description of Support System: Supportive, Involved    Support Assessment: Adequate family and caregiver support    Current Resources:   Patient receiving home care services: No        Community Resources: None  Equipment currently used at home: walker, rolling, shower chair, cane, straight  Supplies currently used at home: Diabetic Supplies    Employment/Financial:  Employment Status: retired        Financial Concerns: none   Referral to Financial Worker: No       Does the patient's insurance plan have a 3 day qualifying hospital stay waiver?  No    Lifestyle & Psychosocial Needs:  Social Drivers of Health     Food Insecurity: Low Risk  (1/9/2025)    Food Insecurity     Within the past 12 months, did you worry that your food would run out before you got money to buy more?: No     Within the past 12 months, did  the food you bought just not last and you didn t have money to get more?: No   Depression: Not at risk (1/6/2025)    PHQ-2     PHQ-2 Score: 2   Recent Concern: Depression - At risk (11/21/2024)    PHQ-2     PHQ-2 Score: 4   Housing Stability: Low Risk  (1/9/2025)    Housing Stability     Do you have housing? : Yes     Are you worried about losing your housing?: No   Tobacco Use: Low Risk  (1/6/2025)    Patient History     Smoking Tobacco Use: Never     Smokeless Tobacco Use: Never     Passive Exposure: Not on file   Financial Resource Strain: Low Risk  (1/9/2025)    Financial Resource Strain     Within the past 12 months, have you or your family members you live with been unable to get utilities (heat, electricity) when it was really needed?: No   Alcohol Use: Not At Risk (6/25/2023)    AUDIT-C     Frequency of Alcohol Consumption: Monthly or less     Average Number of Drinks: Patient does not drink     Frequency of Binge Drinking: Never   Transportation Needs: Low Risk  (1/9/2025)    Transportation Needs     Within the past 12 months, has lack of transportation kept you from medical appointments, getting your medicines, non-medical meetings or appointments, work, or from getting things that you need?: No   Physical Activity: Inactive (7/5/2024)    Exercise Vital Sign     Days of Exercise per Week: 0 days     Minutes of Exercise per Session: 0 min   Interpersonal Safety: Low Risk  (1/9/2025)    Interpersonal Safety     Do you feel physically and emotionally safe where you currently live?: Yes     Within the past 12 months, have you been hit, slapped, kicked or otherwise physically hurt by someone?: No     Within the past 12 months, have you been humiliated or emotionally abused in other ways by your partner or ex-partner?: No   Stress: No Stress Concern Present (7/5/2024)    [employer] - Occupational Stress Questionnaire     Feeling of Stress : Only a little   Social Connections:  "Socially Integrated (2/8/2024)    Received from [facility]    Social Connections     Frequency of Communication with Friends and Family: 0   Health Literacy: Not on file       Functional Status:  Prior to admission patient needed assistance:   Dependent ADLs:: Independent, Ambulation-cane, Ambulation-walker  Dependent IADLs:: Cleaning, Cooking, Laundry, Shopping, Meal Preparation  Assesssment of Functional Status: At functional baseline    Mental Health Status:          Chemical Dependency Status:                Values/Beliefs:  Spiritual, Cultural Beliefs, Yazdanism Practices, Values that affect care: no               Discussed  Partnership in Safe Discharge Planning  document with patient/family: No    Additional Information:  Per care management consult, chart was reviewed. H&P states pt is a \"64 year old male with a history of Htn, Hld, DM2, DAMIÁN, hypothyroidism who is admitted on 1/10/2025 with NSTEMI and multivessel disease requiring CV surgery consult.\"    SW met with pt at bedside and introduced self and role. Pt lives in a house with his wife and adult son. Pt is independent in all ADLs and IADLs. Pt's wife does most of the driving. Pt is a retired . Pt confirmed his emergency contacts and states he is in between PCP. PT has no concerns about discharge.     Next Steps: No further care management intervention anticipated at this time.  Please re-consult if further needs arise.  Care management signing off.       [facility]  Inpatient Care Management      "

## 2025-01-11 NOTE — CONSULTS
[facility]    Cardiology Consultation     Date of Admission:  1/10/2025    Assessment & Plan   Steve Morgan is a 64 year old male who was admitted on 1/10/2025.    Progressive angina with abnormal troponins, non-ST elevation MI  Recent abnormal dobutamine stress echocardiogram, coronary angiography reveals small caliber vessels with distal mid to distal diffuse disease in the LAD.  Small caliber posterior descending artery also has severe disease.  The OM branch has severe disease.  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  Will get CV surgery opinion to see if he is a candidate for bypass surgery.  If not, we will have to consult interventional cardiologist for high risk PCI.  I suspect at least OM PCI can be performed.  Would also recommend IFR across the mid RCA lesion which appears moderate and if candidate, PCI to the PDA branch.  LAD disease since there is small caliber vessel and diffuse disease, may have to be managed medically if not a candidate for bypass.  Not on beta-blockers due to bradycardia.  Continue heparin.  Switch to baby aspirin    Plan  CV surgery opinion  If not a surgical candidate, consult interventional cardiologist  Continue managing risk factors aggressively    At today's visit, reviewed the echo images, the dobutamine stress echo images, the EKGs myself.  I also reviewed the angiogram films.  Today's medical decision making appears to be high complexity..  Plan discussed with the patient in detail and answered his questions          Angel De Leon MD    Primary Care Physician   Physician No Ref-Primary    Reason for Consult   Reason for consult: I was asked by hospitalist to evaluate this patient for CAD.    History of Present Illness   Steve Morgan is a 64 year old male with  history of hypertension, hyperlipidemia, type 2 diabetes, hypothyroidism who presented emergency room at [facility] on 1/9 with chest pain at abnormal stress test.  He was initially having chest pain on and off with exertion when he was outside.    He underwent a dobutamine stress echocardiogram prior to visit to the emergency room because of the chest pain and had developed anteroapical hypokinesis.  He was sent to the emergency room for further evaluation and admitted troponin emergency room was 78 and then increased to 86.  Subsequently coronary angiography revealed small caliber LAD with moderate ostial disease and possible spasm.  There was severe diffuse disease in the mid to distal LAD and diagonal branches.  Small caliber OM1 90% stenosis proximally.  Right posterior descending artery had 90% stenosis.  He was referred to previous our hospital to consider revascularization with coronary artery bypass versus high risk PCI.    At this time, patient is pain-free.  He is on metoprolol amlodipine Zocor and aspirin.  He also has type 2 diabetes and nephropathy.  His last A1c was 7.2 and creatinine 1.7.  He has hypothyroidism he has chronic anemia and benign prostate hypertrophy.  EKG on admission reviewed by me revealed sinus rhythm with possible LVH and T wave inversion anterolateral leads echogram previously has revealed normal ejection fraction.         Past Medical History   Past Medical History:   Diagnosis Date    BPH (benign prostatic hyperplasia)     Cellulitis and abscess of trunk 06/27/2017    Depression     Depressive disorder     Diverticular disease of colon     GERD (gastroesophageal reflux disease)     Hyperlipidemia LDL goal <100 10/31/2010    Hypertension goal BP (blood pressure) < 140/90 03/17/2011    Hypothyroidism 01/12/2010    Morbid obesity due to excess calories (H) 01/12/2010    Neuropathy in diabetes (H) 01/12/2010    Obesity 01/12/2010    PVD (peripheral vascular disease)     Sleep  apnea 01/12/2010    CPAP    Type 2 diabetes, HbA1C goal < 8% (H) 03/08/2011    Vitamin B12 deficiency (non anemic)          Past Surgical History   Past Surgical History:   Procedure Laterality Date    BIOPSY      BURSECTOMY KNEE Right 05/20/2024    Procedure: Excisional prepatellar bursectomy, right knee;  Surgeon: Justin Bo MD;  Location: [facility] OR    COLONOSCOPY      COSMETIC SURGERY      CV CORONARY ANGIOGRAM N/A 1/10/2025    Procedure: Coronary Angiogram;  Surgeon: Magan Gallardo MD;  Location:  HEART [facility] CATH LAB    EYE SURGERY Bilateral     Catracts    GENITOURINARY SURGERY      surg for undescended testicle    IRRIGATION AND DEBRIDEMENT HAND, COMBINED Right 12/23/2022    Procedure: Right long finger irrigation and debridement with partial amputation of the right long finger. ;  Surgeon: Colby English MD;  Location: [facility] OR    REPAIR HAMMER TOE BILATERAL  05/16/2013    Procedure: REPAIR HAMMER TOE BILATERAL;  Flexor Tenotomy Toes 2,3,4,5 Bilateral Feet;  Surgeon: Saad Bangura DPM;  Location:  OR         Prior to Admission Medications   Prior to Admission Medications   Prescriptions Last Dose Informant Patient Reported? Taking?   Calcium Carb-Cholecalciferol (CALCIUM 600 + D PO)   Yes Yes   Sig: Take 1 tablet by mouth daily   Continuous Glucose Sensor (FREESTYLE GIULIANA 2 SENSOR) MISC   No Yes   Sig: Inject 1 each subcutaneously every 14 days. Use 1 sensor every 14 days. Use to read blood sugars per 's instructions.   LEVOXYL 137 MCG tablet   No Yes   Sig: Take 1 tablet (137 mcg) by mouth daily.   MULTI-VITAMIN OR TABS   Yes Yes   Sig: Take 1 tablet by mouth daily   acetaminophen (TYLENOL) 325 MG tablet   No Yes   Sig: Take 2 tablets (650 mg) by mouth every 4 hours as needed for other (For optimal non-opioid multimodal pain management to improve pain control.)   amLODIPine (NORVASC) 5 MG tablet   Yes Yes   Sig: Take 1 tablet by mouth daily at 2 pm.   finasteride  (PROSCAR) 5 MG tablet   No Yes   Sig: Take 1 tablet (5 mg) by mouth daily.   gabapentin (NEURONTIN) 300 MG capsule   No Yes   Sig: Take 1 capsule (300 mg) by mouth 3 times daily   Patient taking differently: Take 300 mg by mouth as needed.   glipiZIDE (GLUCOTROL XL) 10 MG 24 hr tablet   No Yes   Sig: TAKE 1 TABLET (10 MG) BY MOUTH DAILY.   insulin glargine 100 UNIT/ML pen   No Yes   Sig: Inject 35 Units subcutaneously daily. DOSE CHANGE   insulin pen needle (B-D U/F) 31G X 5 MM miscellaneous   No Yes   Sig: USE 1 DAILY OR AS DIRECTED.   latanoprost (XALATAN) 0.005 % ophthalmic solution   Yes Yes   Sig: INSTILL 1 DROP INTO BOTH EYES IN THE EVENING   losartan (COZAAR) 100 MG tablet   No Yes   Sig: Take 1 tablet (100 mg) by mouth daily.   metFORMIN (GLUCOPHAGE) 1000 MG tablet   No Yes   Sig: Take 1 tablet (1,000 mg) by mouth 2 times daily (with meals).   mirtazapine (REMERON) 45 MG tablet   No Yes   Sig: Take 1 tablet (45 mg) by mouth at bedtime.   senna-docusate (SENOKOT-S/PERICOLACE) 8.6-50 MG tablet   No Yes   Sig: Take 1 tablet by mouth 2 times daily as needed for constipation   simvastatin (ZOCOR) 20 MG tablet   Yes Yes   Sig: Take 20 mg by mouth daily   sodium bicarbonate 325 MG tablet   Yes Yes   Sig: Take 325 mg by mouth 2 times daily.   vilazodone (VIIBRYD) 20 MG TABS tablet   No Yes   Sig: Take 1 tablet (20 mg) by mouth daily.      Facility-Administered Medications: None     Current Facility-Administered Medications   Medication Dose Route Frequency Provider Last Rate Last Admin    acetaminophen (TYLENOL) tablet 650 mg  650 mg Oral Q4H PRN Lorenzo Don DO        Or    acetaminophen (TYLENOL) Suppository 650 mg  650 mg Rectal Q4H PRN Lorenzo Don DO        amLODIPine (NORVASC) tablet 5 mg  5 mg Oral Daily Lorenzo Don DO        aspirin (ASA) EC tablet 325 mg  325 mg Oral Daily Lorenzo Don DO   325 mg at 01/11/25 0857    calcium carbonate (TUMS) chewable tablet 1,000 mg  1,000 mg Oral 4x Daily PRN  Lorenzo Don DO        glucose gel 15-30 g  15-30 g Oral Q15 Min PRN Lorenzo Don DO        Or    dextrose 50 % injection 25-50 mL  25-50 mL Intravenous Q15 Min PRN Lorenzo Don DO        Or    glucagon injection 1 mg  1 mg Subcutaneous Q15 Min PRN Lorenzo Don DO        finasteride (PROSCAR) tablet 5 mg  5 mg Oral Daily Lorenzo Don DO   5 mg at 01/11/25 0857    heparin 25,000 units in 0.45% NaCl 250 mL ANTICOAGULANT infusion  0-5,000 Units/hr Intravenous Continuous Lorenzo Don DO 10 mL/hr at 01/11/25 0725 1,000 Units/hr at 01/11/25 0725    HYDROmorphone (DILAUDID) injection 0.2 mg  0.2 mg Intravenous Q2H PRN Lorenzo Don DO        HYDROmorphone (DILAUDID) injection 0.4 mg  0.4 mg Intravenous Q2H PRN Lorenzo Don DO        insulin aspart (NovoLOG) injection (RAPID ACTING)  1-7 Units Subcutaneous TID AC Lorenzo Don DO        insulin aspart (NovoLOG) injection (RAPID ACTING)  1-5 Units Subcutaneous At Bedtime Lorenzo Don DO        insulin glargine (LANTUS PEN) injection 15 Units  15 Units Subcutaneous At Bedtime Brett Isidro MD        latanoprost (XALATAN) 0.005 % ophthalmic solution 1 drop  1 drop Both Eyes At Bedtime Lorenzo Don DO   1 drop at 01/10/25 2233    levothyroxine (SYNTHROID/LEVOTHROID) tablet 137 mcg  137 mcg Oral QAM AC Lorenzo Don DO   137 mcg at 01/11/25 0602    lidocaine (LMX4) cream   Topical Q1H PRN Lorenzo Don DO        lidocaine 1 % 0.1-1 mL  0.1-1 mL Other Q1H PRN Lorenzo Don DO        losartan (COZAAR) tablet 100 mg  100 mg Oral Daily Lorenzo Don DO   100 mg at 01/11/25 0857    mirtazapine (REMERON) tablet 45 mg  45 mg Oral At Bedtime Lorenzo Don DO   45 mg at 01/10/25 2153    naloxone (NARCAN) injection 0.2 mg  0.2 mg Intravenous Q2 Min PRN Goyo Crabtree MD        Or    naloxone (NARCAN) injection 0.4 mg  0.4 mg Intravenous Q2 Min PRN Goyo Crabtree MD        Or    naloxone (NARCAN) injection 0.2 mg  0.2 mg Intramuscular Q2 Min PRN Pal,  MD Goyo        Or    naloxone (NARCAN) injection 0.4 mg  0.4 mg Intramuscular Q2 Min PRN Goyo Crabtree MD        nitroGLYcerin (NITROSTAT) sublingual tablet 0.4 mg  0.4 mg Sublingual Q5 Min PRN Lorenzo Don DO        Patient is already receiving anticoagulation with heparin, enoxaparin (LOVENOX), warfarin (COUMADIN)  or other anticoagulant medication   Does not apply Continuous PRN Lorenzo Don DO        polyethylene glycol (MIRALAX) Packet 17 g  17 g Oral BID PRN Lorenzo Don DO        senna-docusate (SENOKOT-S/PERICOLACE) 8.6-50 MG per tablet 1 tablet  1 tablet Oral BID PRN Lorenzo Don DO        Or    senna-docusate (SENOKOT-S/PERICOLACE) 8.6-50 MG per tablet 2 tablet  2 tablet Oral BID PRN Lorenzo Don DO        simvastatin (ZOCOR) tablet 20 mg  20 mg Oral At Bedtime Lorenzo Don DO   20 mg at 01/10/25 2153    sodium bicarbonate tablet 325 mg  325 mg Oral BID Lorenzo Don DO   325 mg at 01/11/25 0857    sodium chloride (PF) 0.9% PF flush 3 mL  3 mL Intracatheter Q8H Lorenzo Don DO   3 mL at 01/10/25 2204    sodium chloride (PF) 0.9% PF flush 3 mL  3 mL Intracatheter q1 min prn Lorenzo Don DO   3 mL at 01/10/25 2155    vilazodone (VIIBRYD) tablet 20 mg  20 mg Oral Daily Lorenzo Don DO         Current Facility-Administered Medications   Medication Dose Route Frequency Provider Last Rate Last Admin    acetaminophen (TYLENOL) tablet 650 mg  650 mg Oral Q4H PRN Lorenzo Don DO        Or    acetaminophen (TYLENOL) Suppository 650 mg  650 mg Rectal Q4H PRN Lorenzo Don DO        amLODIPine (NORVASC) tablet 5 mg  5 mg Oral Daily Lorenzo Don DO        aspirin (ASA) EC tablet 325 mg  325 mg Oral Daily Lorenzo Don DO   325 mg at 01/11/25 0857    calcium carbonate (TUMS) chewable tablet 1,000 mg  1,000 mg Oral 4x Daily PRN Lorenzo Don DO        glucose gel 15-30 g  15-30 g Oral Q15 Min PRN Lorenzo Don DO        Or    dextrose 50 % injection 25-50 mL  25-50 mL  Intravenous Q15 Min PRN Lorenzo Don DO        Or    glucagon injection 1 mg  1 mg Subcutaneous Q15 Min PRN Lorenzo Don DO        finasteride (PROSCAR) tablet 5 mg  5 mg Oral Daily Lorenzo Don DO   5 mg at 01/11/25 0857    heparin 25,000 units in 0.45% NaCl 250 mL ANTICOAGULANT infusion  0-5,000 Units/hr Intravenous Continuous Lorenzo Don DO 10 mL/hr at 01/11/25 0725 1,000 Units/hr at 01/11/25 0725    HYDROmorphone (DILAUDID) injection 0.2 mg  0.2 mg Intravenous Q2H PRN Lorenzo Don DO        HYDROmorphone (DILAUDID) injection 0.4 mg  0.4 mg Intravenous Q2H PRN Lorenzo Don DO        insulin aspart (NovoLOG) injection (RAPID ACTING)  1-7 Units Subcutaneous TID AC Lorenzo Don DO        insulin aspart (NovoLOG) injection (RAPID ACTING)  1-5 Units Subcutaneous At Bedtime Lorenzo Don DO        insulin glargine (LANTUS PEN) injection 15 Units  15 Units Subcutaneous At Bedtime Brett Isidro MD        latanoprost (XALATAN) 0.005 % ophthalmic solution 1 drop  1 drop Both Eyes At Bedtime Lorenzo Don DO   1 drop at 01/10/25 2233    levothyroxine (SYNTHROID/LEVOTHROID) tablet 137 mcg  137 mcg Oral QAM AC Lorenzo Don DO   137 mcg at 01/11/25 0602    lidocaine (LMX4) cream   Topical Q1H PRN Lorenzo Don DO        lidocaine 1 % 0.1-1 mL  0.1-1 mL Other Q1H PRN Lorenzo Don DO        losartan (COZAAR) tablet 100 mg  100 mg Oral Daily Lorenzo Don DO   100 mg at 01/11/25 0857    mirtazapine (REMERON) tablet 45 mg  45 mg Oral At Bedtime Lorenzo Don DO   45 mg at 01/10/25 2153    naloxone (NARCAN) injection 0.2 mg  0.2 mg Intravenous Q2 Min PRN Goyo Crabtree MD        Or    naloxone (NARCAN) injection 0.4 mg  0.4 mg Intravenous Q2 Min PRN Goyo Crabtree MD        Or    naloxone (NARCAN) injection 0.2 mg  0.2 mg Intramuscular Q2 Min PRN Goyo Crabtree MD        Or    naloxone (NARCAN) injection 0.4 mg  0.4 mg Intramuscular Q2 Min PRN Goyo Crabtree MD        nitroGLYcerin (NITROSTAT) sublingual  tablet 0.4 mg  0.4 mg Sublingual Q5 Min PRN Lorenzo Don DO        Patient is already receiving anticoagulation with heparin, enoxaparin (LOVENOX), warfarin (COUMADIN)  or other anticoagulant medication   Does not apply Continuous PRN Lorenzo Don DO        polyethylene glycol (MIRALAX) Packet 17 g  17 g Oral BID PRN Lorenzo Don DO        senna-docusate (SENOKOT-S/PERICOLACE) 8.6-50 MG per tablet 1 tablet  1 tablet Oral BID PRN Lorenzo Don DO        Or    senna-docusate (SENOKOT-S/PERICOLACE) 8.6-50 MG per tablet 2 tablet  2 tablet Oral BID PRN Lorenzo Don DO        simvastatin (ZOCOR) tablet 20 mg  20 mg Oral At Bedtime Lorenzo Don DO   20 mg at 01/10/25 2153    sodium bicarbonate tablet 325 mg  325 mg Oral BID Lorenzo Don DO   325 mg at 01/11/25 0857    sodium chloride (PF) 0.9% PF flush 3 mL  3 mL Intracatheter Q8H Lorenzo Don DO   3 mL at 01/10/25 2204    sodium chloride (PF) 0.9% PF flush 3 mL  3 mL Intracatheter q1 min prn Lorenzo Don DO   3 mL at 01/10/25 2155    vilazodone (VIIBRYD) tablet 20 mg  20 mg Oral Daily Lorenzo Don DO         Allergies   No Known Allergies    Social History    reports that he has never smoked. He has never used smokeless tobacco. He reports current alcohol use. He reports that he does not use drugs.      Family History   I have reviewed this patient's family history and updated it with pertinent information if needed.  Family History   Problem Relation Age of Onset    Breast Cancer Mother     Hypertension Mother     Thyroid Disease Mother     Depression Mother     Alzheimer Disease Mother 82    Obesity Mother     Cancer - colorectal Father     Thyroid Disease Father     Depression Father     Other - See Comments Father         bladder polyps    Prostate Cancer Father     Other Cancer Father     Diabetes Brother         Brother    Depression Brother     Diabetes Brother     Asthma Brother     Depression Brother     Anxiety Disorder Brother      "Mental Illness Brother     Heart Disease Maternal Grandmother         CHF    Circulatory Paternal Grandmother     Cancer Paternal Grandfather     No Known Problems Daughter     No Known Problems Son     Diabetes Other         Great Aunt          Review of Systems   A comprehensive review of system was performed and is negative other than that noted in the HPI or here.     Physical Exam   Vital Signs with Ranges  Temp:  [97.8  F (36.6  C)-98.3  F (36.8  C)] 98.1  F (36.7  C)  Pulse:  [52-67] 52  Resp:  [14-24] 20  BP: (125-176)/(62-85) 138/64  SpO2:  [92 %-98 %] 92 %  Wt Readings from Last 4 Encounters:   01/11/25 103.9 kg (229 lb)   01/09/25 106.1 kg (233 lb 12.8 oz)   01/06/25 108 kg (238 lb)   12/12/24 109.3 kg (241 lb)     I/O last 3 completed shifts:  In: 80.37 [I.V.:80.37]  Out: -       Vitals: /64 (BP Location: Right arm)   Pulse 52   Temp 98.1  F (36.7  C) (Oral)   Resp 20   Wt 103.9 kg (229 lb)   SpO2 92%   BMI 38.11 kg/m      Physical Exam:   General - Alert and oriented to time place and person in no acute distress  Eyes - No scleral icterus  HEENT - Neck supple, moist mucous membranes  Cardiovascular -regular S1-S2 without murmurs  Extremities - There is no edema  Respiratory -clear to auscultate  Skin -pallor noted  Gastrointestinal - Non tender and non distended without rebound or guarding  Psych - Appropriate affect   Neurological - No gross motor neurological focal deficits    No lab results found in last 7 days.    Invalid input(s): \"TROPONINIES\"    Recent Labs   Lab 01/11/25  0822 01/11/25  0354 01/11/25  0201 01/10/25  0849 01/10/25  0737 01/09/25  2101 01/09/25  1613   WBC  --  6.5  --   --  5.7  --  5.4   HGB  --  9.8*  --   --  10.5*  --  9.8*   MCV  --  87  --   --  85  --  86   PLT  --  269  --   --  292  --  278   NA  --  134*  --   --  134*  --  135   POTASSIUM  --  4.9  --   --  5.1  --  5.1   CHLORIDE  --  101  --   --  100  --  102   CO2  --  25  --   --  20*  --  22   BUN  -- " " 23.0  --   --  24.7*  --  23.7*   CR  --  1.88*  --   --  1.76*  --  1.70*   GFRESTIMATED  --  39*  --   --  42*  --  44*   ANIONGAP  --  8  --   --  14  --  11   CHAR  --  9.5  --   --  9.6  --  9.7   GLC 90 87 102*   < > 132*   < > 165*    < > = values in this interval not displayed.     Recent Labs   Lab Test 24  1145 23  1126   CHOL 206* 127   HDL 26* 33*   * 71   TRIG 252* 113     Recent Labs   Lab 25  0354 01/10/25  0737 25  1613   WBC 6.5 5.7 5.4   HGB 9.8* 10.5* 9.8*   HCT 28.9* 30.5* 29.2*   MCV 87 85 86    292 278     No results for input(s): \"PH\", \"PHV\", \"PO2\", \"PO2V\", \"SAT\", \"PCO2\", \"PCO2V\", \"HCO3\", \"HCO3V\" in the last 168 hours.  No results for input(s): \"NTBNPI\", \"NTBNP\" in the last 168 hours.  No results for input(s): \"DD\" in the last 168 hours.  No results for input(s): \"SED\", \"CRP\" in the last 168 hours.  Recent Labs   Lab 25  0354 01/10/25  0737 25  1613    292 278     No results for input(s): \"TSH\" in the last 168 hours.  No results for input(s): \"COLOR\", \"APPEARANCE\", \"URINEGLC\", \"URINEBILI\", \"URINEKETONE\", \"SG\", \"UBLD\", \"URINEPH\", \"PROTEIN\", \"UROBILINOGEN\", \"NITRITE\", \"LEUKEST\", \"RBCU\", \"WBCU\" in the last 168 hours.    Imaging:  Recent Results (from the past 48 hours)   Dobutamine Stress Echocardiogram    Narrative    [id number]  [id number]  [id number]  [id number]     [facility]  Echocardiography Laboratory  [address] [place] [address]     Name: ALBERT HUFFMAN  MRN: [id number]  : 1960  Study Date: 2025 02:20 PM  Age: 64 yrs  Gender: Male  Patient Location: [facility]  Reason For Study: COWART (dyspnea on exertion)  History: Family History,Diabetes,CKD  Ordering Physician: LAYO JOSEPH  Referring Physician: LAYO JOSEPH  Performed By: Ellen Bass     BSA: 2.1 m2  Height: 65 in  Weight: 238 lb  HR: 76  BP: 167/74 " "mmHg  ______________________________________________________________________________  Procedure  Dobutamine Stress Echocardiogram with two dimensional, color and spectral  Doppler. Optison contrast given intravenously [[facility]C #[id number]].  ______________________________________________________________________________  Interpretation Summary  Baseline ECG NSR with diffuse non specific T wave changes  Baseline echo shows normal EF. There is mild proximal septal thickening.  Doppler for a sub aortic gradient not performed at rest. The distal  septum/apex may be borderline hypokinetic at rest (which could be CAD or mild  hypokinesis if there were sub aortic/intra LV cavity gradient upstream from  the apex)  The patient exhibited hypertension at rest.  With dobutamine the T wave changes \"normalized\", patient became more  hypertensive.The mid LV cavity narrowed (doppler gradient was not performed)  and a small portion of the apex and apical lateral wall may have become more  hypokinetic than was seen at rest. The post-dobutamine echo pictures suggest  ongoing mild hypokinesis in these areas. Clincally the patient complain chest  discomfort despite the ECG looking better. The patient was given IV metoprolol  and the symptoms abated. This test could be suggestive of distal LAD or OM  disease vs apical distention from a possible mid cavity gradient. The patient  was sent to the ER for admission. If the patient gets a heart cath they may  wish to consider PVC stimulation to see if LV cavity gradient if there is no  significant CAD  ______________________________________________________________________________  Stress  Dobutamine Time: 7:05.  The drug infusion was stopped due to target heart rate achieved.  The patient exhibited chest pain during the drug infusion.  There was a hypertensive BP response to drug infusion.  RPP 09119.  The maximum dose of dobutamine was 20mcg/kg/min.  The maximum dose of metoprolol was 13mg.  Target " "Heart Rate was achieved.  With dobutamine the T wave changes \"normalized\", patient became more  hypertensive.The mid LV cavity narrowed (doppler gradient was not performed)  and a small portion of the apex and apical lateral wall may have become more  hypokinetic than was seen at rest. The post-dobutamine echo pictures suggest  ongoing mild hypokinesis in these areas. Clincally the patient complain chest  discomfort despite the ECG looking better. The patient was given IV metoprolol  and the symptoms abated. This test could be suggestive of distal LAD or OM  disease vs apical distention from a possible mid cavity gradient. The patient  was sent to the ER for admission. If the patient gets a heart cath they may  wish to consider PVC stimulation to see if LV cavity gradient if there is no  significant CAD.  This was an abnormal stress echocardiogram with an intermediate risk.     Baseline  The patient is in normal sinus rhythm.  Baseline ECG NSR with diffuse non specific T wave changes.  The patient exhibited hypertension at rest.  Baseline echo shows normal EF. There is mild proximal septal thickening.  Doppler for a sub aortic gradient not performed at rest. The distal  septum/apex may be borderline hypokinetic at rest (which could be CAD or mild  hypokinesis if there were sub aortic/intra LV cavity gradient upstream from  the apex).     Stress Results           Protocol:  Dobutamine        Maximum Predicted HR:   154 bpm           Target HR: 131 bpm           % Maximum Predicted HR: 97 %              Duration Heart    Stage   (mm:ss)  Rate    BP  Dose                  Comment                    (bpm)   Stage 1   3:00     75   181/8110.00   Stage 2   4:05    133   185/7820.00             24:00    87   165/98     9/10 Chest pain; 209/110 was highest BP;  RecoveryR                           13mg Metoprolol given total            Stress Duration:   7:05 mm:ss *        Recovery Time: 24:00 mm:ss         Maximum Stress " HR: 150 bpm *           METS:          1  ______________________________________________________________________________  MMode/2D Measurements & Calculations  asc Aorta Diam: 3.1 cm  Asc Ao diam index BSA (cm/m2): 1.5  Asc Ao diam index Ht(cm/m): 1.9     ______________________________________________________________________________  Report approved by: Hebert Pollock MD on 01/09/2025 04:21 PM         XR Chest 2 Views    Narrative    EXAM: XR CHEST 2 VIEWS  LOCATION: [facility]  DATE: 1/9/2025    INDICATION: Chest pain.  COMPARISON: Chest radiograph 11/22/2024.      Impression    IMPRESSION:     Lungs are clear. No pleural effusions or pneumothorax. Normal pulmonary vascularity.    Normal cardiac silhouette.   Cardiac Catheterization    Narrative    Extensive multivessel CAD as described below.  Unfortunately, majority of   disease is distal vessel, with generally suboptimal graft targets.  LM: No LM; LAD and LCx arise from separate aortic ostia  LAD: Small caliber vessel with moderate ostial disease (potentially   exacerbated by catheter induced spasm), with severe diffuse coronary   disease throughout the mid-distal LAD, diagonal branches, and septal   branches  LCx: Small caliber vessel with only a single moderate-sized OM branch   (1.5-2.0 mm).  The OM branch has a 90% stenosis proximally.  RCA: This is the dominant vessel and is relatively healthy within the RCA   itself other than a moderate focal mid RCA stenosis.  However, the RPDA   has a 90% proximal stenosis with moderate diffuse disease elsewhere.  The   RPL system is relatively healthy mild-moderate diffuse disease.      Recommend transfer to [facility] for multidisciplinary   evaluation regarding revascularization (CABG versus PCI).  Given extensive   coronary disease, CABG would typically be preferred, but unfortunately   distal graft targets (particularly within the LAD) are very poor in this   case.    "      Echo:  No results found for this or any previous visit (from the past 4320 hours).    Clinically Significant Risk Factors Present on Admission         # Hyponatremia: Lowest Na = 134 mmol/L in last 2 days, will monitor as appropriate           # Hypertension: Noted on problem list      # Anemia: based on hgb <11      # DMII: A1C = 7.2 % (Ref range: 0.0 - 5.6 %) within past 6 months    # Obesity: Estimated body mass index is 38.11 kg/m  as calculated from the following:    Height as of 1/9/25: 1.651 m (5' 5\").    Weight as of this encounter: 103.9 kg (229 lb).             ematology, prior to admission on antiplatelet agent and anticoagulation      CKD POA List: Stage 3b (GFR 30-44)                              "

## 2025-01-12 ENCOUNTER — APPOINTMENT (OUTPATIENT)
Dept: ULTRASOUND IMAGING | Facility: CLINIC | Age: 65
DRG: 322 | End: 2025-01-12
Attending: PHYSICIAN ASSISTANT
Payer: MEDICARE

## 2025-01-12 ENCOUNTER — APPOINTMENT (OUTPATIENT)
Dept: CT IMAGING | Facility: CLINIC | Age: 65
DRG: 322 | End: 2025-01-12
Attending: PHYSICIAN ASSISTANT
Payer: MEDICARE

## 2025-01-12 LAB
ANION GAP SERPL CALCULATED.3IONS-SCNC: 10 MMOL/L (ref 7–15)
BUN SERPL-MCNC: 23.1 MG/DL (ref 8–23)
CALCIUM SERPL-MCNC: 9.5 MG/DL (ref 8.8–10.4)
CHLORIDE SERPL-SCNC: 99 MMOL/L (ref 98–107)
CREAT SERPL-MCNC: 1.82 MG/DL (ref 0.67–1.17)
EGFRCR SERPLBLD CKD-EPI 2021: 40 ML/MIN/1.73M2
GLUCOSE BLDC GLUCOMTR-MCNC: 198 MG/DL (ref 70–99)
GLUCOSE BLDC GLUCOMTR-MCNC: 231 MG/DL (ref 70–99)
GLUCOSE BLDC GLUCOMTR-MCNC: 254 MG/DL (ref 70–99)
GLUCOSE SERPL-MCNC: 128 MG/DL (ref 70–99)
GLUCOSE SERPL-MCNC: 129 MG/DL (ref 70–99)
HCO3 SERPL-SCNC: 22 MMOL/L (ref 22–29)
HGB BLD-MCNC: 9.7 G/DL (ref 13.3–17.7)
POTASSIUM SERPL-SCNC: 4.6 MMOL/L (ref 3.4–5.3)
SODIUM SERPL-SCNC: 131 MMOL/L (ref 135–145)
UFH PPP CHRO-ACNC: 0.21 IU/ML
UFH PPP CHRO-ACNC: 0.25 IU/ML
UFH PPP CHRO-ACNC: 0.4 IU/ML

## 2025-01-12 PROCEDURE — 93931 UPPER EXTREMITY STUDY: CPT | Mod: LT

## 2025-01-12 PROCEDURE — 250N000013 HC RX MED GY IP 250 OP 250 PS 637: Performed by: INTERNAL MEDICINE

## 2025-01-12 PROCEDURE — 82435 ASSAY OF BLOOD CHLORIDE: CPT | Performed by: INTERNAL MEDICINE

## 2025-01-12 PROCEDURE — 36415 COLL VENOUS BLD VENIPUNCTURE: CPT | Performed by: INTERNAL MEDICINE

## 2025-01-12 PROCEDURE — 250N000011 HC RX IP 250 OP 636: Performed by: INTERNAL MEDICINE

## 2025-01-12 PROCEDURE — 85520 HEPARIN ASSAY: CPT | Performed by: INTERNAL MEDICINE

## 2025-01-12 PROCEDURE — 80048 BASIC METABOLIC PNL TOTAL CA: CPT | Performed by: INTERNAL MEDICINE

## 2025-01-12 PROCEDURE — 82947 ASSAY GLUCOSE BLOOD QUANT: CPT | Performed by: INTERNAL MEDICINE

## 2025-01-12 PROCEDURE — 71250 CT THORAX DX C-: CPT

## 2025-01-12 PROCEDURE — 99232 SBSQ HOSP IP/OBS MODERATE 35: CPT | Performed by: INTERNAL MEDICINE

## 2025-01-12 PROCEDURE — 36415 COLL VENOUS BLD VENIPUNCTURE: CPT | Performed by: HOSPITALIST

## 2025-01-12 PROCEDURE — 85018 HEMOGLOBIN: CPT | Performed by: INTERNAL MEDICINE

## 2025-01-12 PROCEDURE — 82565 ASSAY OF CREATININE: CPT | Performed by: INTERNAL MEDICINE

## 2025-01-12 PROCEDURE — 85520 HEPARIN ASSAY: CPT | Performed by: HOSPITALIST

## 2025-01-12 PROCEDURE — 210N000002 HC R&B HEART CARE

## 2025-01-12 PROCEDURE — 93970 EXTREMITY STUDY: CPT

## 2025-01-12 PROCEDURE — 93880 EXTRACRANIAL BILAT STUDY: CPT

## 2025-01-12 PROCEDURE — 99222 1ST HOSP IP/OBS MODERATE 55: CPT | Mod: FS | Performed by: PHYSICIAN ASSISTANT

## 2025-01-12 PROCEDURE — 99233 SBSQ HOSP IP/OBS HIGH 50: CPT | Performed by: HOSPITALIST

## 2025-01-12 RX ORDER — ISOSORBIDE MONONITRATE 30 MG/1
30 TABLET, EXTENDED RELEASE ORAL DAILY
Status: DISCONTINUED | OUTPATIENT
Start: 2025-01-12 | End: 2025-01-15

## 2025-01-12 RX ORDER — GABAPENTIN 300 MG/1
300 CAPSULE ORAL 3 TIMES DAILY PRN
Status: DISCONTINUED | OUTPATIENT
Start: 2025-01-12 | End: 2025-01-15 | Stop reason: HOSPADM

## 2025-01-12 RX ADMIN — SODIUM BICARBONATE 325 MG: 325 TABLET ORAL at 22:02

## 2025-01-12 RX ADMIN — AMLODIPINE BESYLATE 5 MG: 5 TABLET ORAL at 14:31

## 2025-01-12 RX ADMIN — VILAZODONE HYDROCHLORIDE 20 MG: 20 TABLET ORAL at 22:03

## 2025-01-12 RX ADMIN — FINASTERIDE 5 MG: 5 TABLET, FILM COATED ORAL at 07:43

## 2025-01-12 RX ADMIN — ACETAMINOPHEN 650 MG: 325 TABLET, FILM COATED ORAL at 22:04

## 2025-01-12 RX ADMIN — HEPARIN SODIUM 1150 UNITS/HR: 10000 INJECTION, SOLUTION INTRAVENOUS at 07:56

## 2025-01-12 RX ADMIN — LATANOPROST 1 DROP: 50 SOLUTION OPHTHALMIC at 22:04

## 2025-01-12 RX ADMIN — MIRTAZAPINE 45 MG: 15 TABLET, FILM COATED ORAL at 22:02

## 2025-01-12 RX ADMIN — SENNOSIDES AND DOCUSATE SODIUM 1 TABLET: 50; 8.6 TABLET ORAL at 11:41

## 2025-01-12 RX ADMIN — METOPROLOL TARTRATE 12.5 MG: 25 TABLET, FILM COATED ORAL at 12:19

## 2025-01-12 RX ADMIN — LOSARTAN POTASSIUM 100 MG: 100 TABLET, FILM COATED ORAL at 07:44

## 2025-01-12 RX ADMIN — ASPIRIN 81 MG: 81 TABLET, COATED ORAL at 07:41

## 2025-01-12 RX ADMIN — INSULIN ASPART 2 UNITS: 100 INJECTION, SOLUTION INTRAVENOUS; SUBCUTANEOUS at 17:25

## 2025-01-12 RX ADMIN — SODIUM BICARBONATE 325 MG: 325 TABLET ORAL at 07:42

## 2025-01-12 RX ADMIN — INSULIN ASPART 2 UNITS: 100 INJECTION, SOLUTION INTRAVENOUS; SUBCUTANEOUS at 12:23

## 2025-01-12 RX ADMIN — LEVOTHYROXINE SODIUM 137 MCG: 112 TABLET ORAL at 06:05

## 2025-01-12 RX ADMIN — ISOSORBIDE MONONITRATE 30 MG: 30 TABLET, EXTENDED RELEASE ORAL at 12:19

## 2025-01-12 RX ADMIN — SENNOSIDES AND DOCUSATE SODIUM 2 TABLET: 50; 8.6 TABLET ORAL at 22:03

## 2025-01-12 RX ADMIN — SIMVASTATIN 20 MG: 20 TABLET, FILM COATED ORAL at 22:04

## 2025-01-12 RX ADMIN — METOPROLOL TARTRATE 12.5 MG: 25 TABLET, FILM COATED ORAL at 22:03

## 2025-01-12 ASSESSMENT — ACTIVITIES OF DAILY LIVING (ADL)
ADLS_ACUITY_SCORE: 59
ADLS_ACUITY_SCORE: 63
ADLS_ACUITY_SCORE: 60
ADLS_ACUITY_SCORE: 59
ADLS_ACUITY_SCORE: 59
ADLS_ACUITY_SCORE: 60
ADLS_ACUITY_SCORE: 59
ADLS_ACUITY_SCORE: 59
ADLS_ACUITY_SCORE: 63
ADLS_ACUITY_SCORE: 59
ADLS_ACUITY_SCORE: 62
ADLS_ACUITY_SCORE: 60
ADLS_ACUITY_SCORE: 62
ADLS_ACUITY_SCORE: 60
ADLS_ACUITY_SCORE: 59
ADLS_ACUITY_SCORE: 63
ADLS_ACUITY_SCORE: 62
ADLS_ACUITY_SCORE: 59
ADLS_ACUITY_SCORE: 62
ADLS_ACUITY_SCORE: 60
ADLS_ACUITY_SCORE: 62

## 2025-01-12 NOTE — PLAN OF CARE
Care plan note:      Recent Vitals:  Temp: 97.6  F (36.4  C) Temp src: Oral BP: (!) 159/89 Pulse: 67   Resp: 20 SpO2: 97 % O2 Device: None (Room air)      Orientation/Neuro: Alert and Oriented x 4  Pain: The patient is not having any pain, did report  a quick since of chest pain that then goes away.               Tele: Sinus rhythm / SB              IV medications: Heparin gtt, next 10a at 1730              Mobility: St. by assist, Gait belt, and Walker              Skin: Radial site: rt radial CDI, slightly ecchymotic, good pulse , baseline neuropathy              Resp: RA  GI: constipated, senna given  : WDL                 Diet: Tolerating diet:   Well  Orders Placed This Encounter      Combination Diet Moderate Consistent Carb (60 g CHO per Meal) Diet; Low Saturated Fat Na <2400mg Diet, No Caffeine Diet                 Safety/Concerns:  Fall Risk and Isolation precautions  Aggression Color: Green     Plan: Hospitalist and cards following. Added metoprolol and Imdur. CVS consult consult, obtained CT Chest and US, follow up on planning  on CABG vs PCI tomorrow.  Blood sugar stable.               Continue to monitor.        Sri Paz RN

## 2025-01-12 NOTE — PLAN OF CARE
Goal Outcome Evaluation:  DATE & TIME: 01/11/2025, [id number]    Cognitive Concerns/ Orientation : A & O times four   BEHAVIOR & AGGRESSION TOOL COLOR: calm   ABNL VS/O2: BP in the higher range, but stable. Room air  MOBILITY: stand by assist  PAIN MANAGMENT: denied  DIET: combination CHO and cardiac diet  BOWEL/BLADDER: voids per BRP  ABNL LAB/BG: UCB=420/174  DRAIN/DEVICES: PIV times two, one infusing  TELEMETRY RHYTHM: NSR  SKIN: see flow sheet  TESTS/PROCEDURES: lab  D/C DATE: pending  OTHER IMPORTANT INFO:  Several three vessel disease. Awaiting Cardio surgery team to see.   On heparin gtt, his last lab was goal rate. Next lab due 0339.  Continue to monitor.

## 2025-01-12 NOTE — CONSULTS
CARDIOTHORACIC SURGERY CONSULT NOTE  1/12/2025      Reason for Consult: multivessel coronary artery disease and NSTEMI      ASSESSMENT/PLAN:   Steve Morgan is a 64 year old male with history of hypertension, hyperlipidemia, type 2 diabetes, hypothyroidism who presented emergency room at [facility] on 1/9 with chest pain at abnormal stress test. He was initially having chest pain on and off with exertion when he was outside. He underwent a dobutamine stress echocardiogram prior to visit to the emergency room because of the chest pain and had developed anteroapical hypokinesis. He was sent to the emergency room for further evaluation and was admitted. Troponin increase from 78 to 86. Subsequently coronary angiography revealed small caliber LAD with moderate ostial disease and possible spasm. There was severe diffuse disease in the mid to distal LAD and diagonal branches. Small caliber OM1 90% stenosis proximally. Right posterior descending artery had 90% stenosis. He was referred to [facility] for consideration of revascularization with coronary artery bypass versus high risk PCI.     At this time, patient is pain-free. He is on metoprolol, amlodipine, Zocor and aspirin. He also has type 2 diabetes and nephropathy. His last A1c was 7.2 and creatinine 1.82. He has hypothyroidism he has chronic anemia and benign prostate hypertrophy.  EKG on admission showed sinus rhythm with possible LVH and T wave inversion anterolateral leads. Previous echocardiogram showed a normal ejection fraction. Pt is currently on a heparin gtt.    Echocardiogram (12/20/24) - EF 55-60%   RV is normal is structure, size and function   Trace mitral regurg  Trace tricuspid regurg  Small mobile echodensity noted on NCC    - Will with cardiothoracic surgeon on 1/13  - Will obtain vein mapping, bilateral carotid duplex US, US L extremity and CT chest w/o contrast  - Other cares per primary team  - Thank you for the opportunity to participate in the  care of this patient.    STS Risk Score for isolated CABG     Procedure Type: Isolated CABG  Perioperative Outcome Estimate %  Operative Mortality 1.22%  Morbidity & Mortality 8.73%  Stroke 0.84%  Renal Failure 4.45%  Reoperation 1.98%  Prolonged Ventilation 4.01%  Deep Sternal Wound Infection 0.217%  Long Hospital Stay (>14 days) 4.12%  Short Hospital Stay (<6 days) 42.9%      Patient and plan discussed with attending on 1/13/25.       Eliu Russo PA-C  Cardiothoracic Surgery  Pager [phone]        ________________________________________________________________________________________________    HPI:   He otherwise denies any chest pain, SOB, lightheadedness, dizziness, palpitations, LE edema.     Pt is on imdur 30 mg, losartan 100 mg, lopressor 12.5 mg PO bid, simvastatin 20 mg and amlodipine 5 mg. +heparin gtt    PMH:  Past Medical History:   Diagnosis Date    BPH (benign prostatic hyperplasia)     Cellulitis and abscess of trunk 06/27/2017    Depression     Depressive disorder     Diverticular disease of colon     GERD (gastroesophageal reflux disease)     Hyperlipidemia LDL goal <100 10/31/2010    Hypertension goal BP (blood pressure) < 140/90 03/17/2011    Hypothyroidism 01/12/2010    Morbid obesity due to excess calories (H) 01/12/2010    Neuropathy in diabetes (H) 01/12/2010    Obesity 01/12/2010    PVD (peripheral vascular disease)     Sleep apnea 01/12/2010    CPAP    Type 2 diabetes, HbA1C goal < 8% (H) 03/08/2011    Vitamin B12 deficiency (non anemic)        PSH:  Past Surgical History:   Procedure Laterality Date    BIOPSY      BURSECTOMY KNEE Right 05/20/2024    Procedure: Excisional prepatellar bursectomy, right knee;  Surgeon: Justin Bo MD;  Location: [facility] OR    COLONOSCOPY      COSMETIC SURGERY      CV CORONARY ANGIOGRAM N/A 1/10/2025    Procedure: Coronary Angiogram;  Surgeon: Magan Gallardo MD;  Location: [facility]    EYE SURGERY Bilateral     Catracts     GENITOURINARY SURGERY      surg for undescended testicle    IRRIGATION AND DEBRIDEMENT HAND, COMBINED Right 12/23/2022    Procedure: Right long finger irrigation and debridement with partial amputation of the right long finger. ;  Surgeon: Colby English MD;  Location: [facility] OR    REPAIR HAMMER TOE BILATERAL  05/16/2013    Procedure: REPAIR HAMMER TOE BILATERAL;  Flexor Tenotomy Toes 2,3,4,5 Bilateral Feet;  Surgeon: Saad Bangura DPM;  Location: [facility] OR       FH:  Family History   Problem Relation Age of Onset    Breast Cancer Mother     Hypertension Mother     Thyroid Disease Mother     Depression Mother     Alzheimer Disease Mother 82    Obesity Mother     Cancer - colorectal Father     Thyroid Disease Father     Depression Father     Other - See Comments Father         bladder polyps    Prostate Cancer Father     Other Cancer Father     Diabetes Brother         Brother    Depression Brother     Diabetes Brother     Asthma Brother     Depression Brother     Anxiety Disorder Brother     Mental Illness Brother     Heart Disease Maternal Grandmother         CHF    Circulatory Paternal Grandmother     Cancer Paternal Grandfather     No Known Problems Daughter     No Known Problems Son     Diabetes Other         Great Aunt       SH:  Social History     Socioeconomic History    Marital status:      Spouse name: Sri    Number of children: 2    Highest education level: Associate degree: occupational, technical, or vocational program   Occupational History    Occupation:      Employer: NONE      Comment: [employer]   Tobacco Use    Smoking status: Never    Smokeless tobacco: Never    Tobacco comments:     Smoked for about 6 months when I was 16 but haven't touched them since   Vaping Use    Vaping status: Never Used   Substance and Sexual Activity    Alcohol use: Yes     Comment: yearly    Drug use: Never    Sexual activity: Not Currently     Partners: Female     Birth control/protection:  Abstinence   Other Topics Concern    Parent/sibling w/ CABG, MI or angioplasty before 65F 55M? Yes     Social Drivers of Health     Financial Resource Strain: Low Risk  (1/9/2025)    Financial Resource Strain     Within the past 12 months, have you or your family members you live with been unable to get utilities (heat, electricity) when it was really needed?: No   Food Insecurity: Low Risk  (1/9/2025)    Food Insecurity     Within the past 12 months, did you worry that your food would run out before you got money to buy more?: No     Within the past 12 months, did the food you bought just not last and you didn t have money to get more?: No   Transportation Needs: Low Risk  (1/9/2025)    Transportation Needs     Within the past 12 months, has lack of transportation kept you from medical appointments, getting your medicines, non-medical meetings or appointments, work, or from getting things that you need?: No   Physical Activity: Inactive (7/5/2024)    Exercise Vital Sign     Days of Exercise per Week: 0 days     Minutes of Exercise per Session: 0 min   Stress: No Stress Concern Present (7/5/2024)    [facility] [place] [facility] [employer] - Occupational Stress Questionnaire     Feeling of Stress : Only a little   Social Connections: Socially Integrated (2/8/2024)    Received from [employer] [facility], [employer] [facility]    Social Connections     Frequency of Communication with Friends and Family: 0   Interpersonal Safety: Low Risk  (1/9/2025)    Interpersonal Safety     Do you feel physically and emotionally safe where you currently live?: Yes     Within the past 12 months, have you been hit, slapped, kicked or otherwise physically hurt by someone?: No     Within the past 12 months, have you been humiliated or emotionally abused in other ways by your partner or ex-partner?: No   Housing Stability: Low Risk  (1/9/2025)    Housing Stability     Do you have housing? :  Yes     Are you worried about losing your housing?: No       Home Meds:  Medications Prior to Admission   Medication Sig Dispense Refill Last Dose/Taking    acetaminophen (TYLENOL) 325 MG tablet Take 2 tablets (650 mg) by mouth every 4 hours as needed for other (For optimal non-opioid multimodal pain management to improve pain control.)   Taking As Needed    amLODIPine (NORVASC) 5 MG tablet Take 1 tablet by mouth daily at 2 pm.   Taking    Calcium Carb-Cholecalciferol (CALCIUM 600 + D PO) Take 1 tablet by mouth daily   Taking    Continuous Glucose Sensor (FREESTYLE GIULIANA 2 SENSOR) MISC Inject 1 each subcutaneously every 14 days. Use 1 sensor every 14 days. Use to read blood sugars per 's instructions. 6 each 0 Taking    finasteride (PROSCAR) 5 MG tablet Take 1 tablet (5 mg) by mouth daily. 90 tablet 1 Taking    gabapentin (NEURONTIN) 300 MG capsule Take 1 capsule (300 mg) by mouth 3 times daily (Patient taking differently: Take 300 mg by mouth as needed.) 270 capsule 1 Taking Differently    glipiZIDE (GLUCOTROL XL) 10 MG 24 hr tablet TAKE 1 TABLET (10 MG) BY MOUTH DAILY. 90 tablet 7 Taking    insulin glargine 100 UNIT/ML pen Inject 35 Units subcutaneously daily. DOSE CHANGE 30 mL 2 Taking    insulin pen needle (B-D U/F) 31G X 5 MM miscellaneous USE 1 DAILY OR AS DIRECTED. 100 each 3 Taking    latanoprost (XALATAN) 0.005 % ophthalmic solution INSTILL 1 DROP INTO BOTH EYES IN THE EVENING   Taking    LEVOXYL 137 MCG tablet Take 1 tablet (137 mcg) by mouth daily. 90 tablet 3 Taking    losartan (COZAAR) 100 MG tablet Take 1 tablet (100 mg) by mouth daily. 90 tablet 1 Taking    metFORMIN (GLUCOPHAGE) 1000 MG tablet Take 1 tablet (1,000 mg) by mouth 2 times daily (with meals). 180 tablet 0 Taking    mirtazapine (REMERON) 45 MG tablet Take 1 tablet (45 mg) by mouth at bedtime. 90 tablet 0 Taking    MULTI-VITAMIN OR TABS Take 1 tablet by mouth daily 30 0 Taking    senna-docusate (SENOKOT-S/PERICOLACE) 8.6-50 MG  tablet Take 1 tablet by mouth 2 times daily as needed for constipation 20 tablet 0 Taking As Needed    simvastatin (ZOCOR) 20 MG tablet Take 20 mg by mouth daily   Taking    sodium bicarbonate 325 MG tablet Take 325 mg by mouth 2 times daily.   Taking    vilazodone (VIIBRYD) 20 MG TABS tablet Take 1 tablet (20 mg) by mouth daily. 30 tablet 1 Taking     Allergies:  No Known Allergies  ROS: 10 point ROS neg other than the symptoms noted above in the HPI.    Physical Exam:  Temp:  [97.4  F (36.3  C)-98.2  F (36.8  C)] 97.6  F (36.4  C)  Pulse:  [58-95] 67  Resp:  [16-20] 20  BP: ()/(53-89) 159/89  SpO2:  [95 %-98 %] 97 %  Gen: up at side of bed, comfortable, -O2  CV: RRR, telemetry SR 60s, +edema LE  Pulm: CTA  Abd: BS+, NT/ND, soft  Neuro: grossly normal  Psych: calm, cooperative   +heparin gtt      Labs:  ABG No lab results found in last 7 days.  CBC  Recent Labs   Lab 01/12/25  0620 01/11/25  0354 01/10/25  0737 01/09/25  1613 01/06/25  1139   WBC  --  6.5 5.7 5.4 6.3   HGB 9.7* 9.8* 10.5* 9.8* 10.2*   PLT  --  269 292 278 273     BMP  Recent Labs   Lab 01/12/25  0620 01/12/25  0326 01/11/25  2112 01/11/25  1700 01/11/25  0822 01/11/25  0354 01/10/25  0849 01/10/25  0737 01/09/25  2101 01/09/25  1613   *  --   --   --   --  134*  --  134*  --  135   POTASSIUM 4.6  --   --   --   --  4.9  --  5.1  --  5.1   CHLORIDE 99  --   --   --   --  101  --  100  --  102   CO2 22  --   --   --   --  25  --  20*  --  22   BUN 23.1*  --   --   --   --  23.0  --  24.7*  --  23.7*   CR 1.82*  --   --   --   --  1.88*  --  1.76*  --  1.70*   * 128* 174* 237*   < > 87   < > 132*   < > 165*    < > = values in this interval not displayed.     LFTNo lab results found in last 7 days.  PancreasNo lab results found in last 7 days.    Imaging:  No results found for this or any previous visit (from the past 24 hours).

## 2025-01-12 NOTE — PROGRESS NOTES
[facility]    Hospitalist Progress Note    Interval History   Patient awake and alert.  No acute events overnight.  Denies any active chest pain.  Did have mild chest tightness earlier in the day that resolved.    -Data reviewed today: I reviewed all new labs and imaging results over the last 24 hours. I personally reviewed the chest x-ray image(s) showing clear lungs  .    Physical Exam   Temp: 97.6  F (36.4  C) Temp src: Oral BP: (!) 159/89 Pulse: 67   Resp: 20 SpO2: 97 % O2 Device: None (Room air)    Vitals:    01/11/25 0537 01/12/25 0330   Weight: 103.9 kg (229 lb) 104.6 kg (230 lb 11.2 oz)     Vital Signs with Ranges  Temp:  [97.4  F (36.3  C)-98.2  F (36.8  C)] 97.6  F (36.4  C)  Pulse:  [58-95] 67  Resp:  [16-20] 20  BP: ()/(53-89) 159/89  SpO2:  [95 %-98 %] 97 %  I/O last 3 completed shifts:  In: 1550 [P.O.:1550]  Out: -     Physical Exam  Constitutional:       Appearance: Normal appearance. He is obese.   Cardiovascular:      Rate and Rhythm: Normal rate and regular rhythm.      Pulses: Normal pulses.      Heart sounds: Normal heart sounds.   Pulmonary:      Effort: Pulmonary effort is normal. No respiratory distress.      Breath sounds: Normal breath sounds.   Abdominal:      General: Abdomen is flat. Bowel sounds are normal. There is no distension.      Tenderness: There is no abdominal tenderness. There is no guarding.   Musculoskeletal:      Comments: Contracture of the fingers in both hands with neuropathy    Skin:     General: Skin is warm and dry.      Coloration: Skin is pale.   Neurological:      General: No focal deficit present.           Medications   Current Facility-Administered Medications   Medication Dose Route Frequency Provider Last Rate Last Admin    heparin 25,000 units in 0.45% NaCl 250 mL ANTICOAGULANT infusion  0-5,000 Units/hr Intravenous Continuous Lorenzo Don DO 13 mL/hr at 01/12/25 1136 1,300 Units/hr at 01/12/25 1136    Patient is already  receiving anticoagulation with heparin, enoxaparin (LOVENOX), warfarin (COUMADIN)  or other anticoagulant medication   Does not apply Continuous PRN Lorenzo Don DO         Current Facility-Administered Medications   Medication Dose Route Frequency Provider Last Rate Last Admin    amLODIPine (NORVASC) tablet 5 mg  5 mg Oral Daily Lorenzo Don DO   5 mg at 01/11/25 1346    aspirin EC tablet 81 mg  81 mg Oral Daily Angel De Leon MD   81 mg at 01/12/25 0741    finasteride (PROSCAR) tablet 5 mg  5 mg Oral Daily Lorenzo Don DO   5 mg at 01/12/25 0743    insulin aspart (NovoLOG) injection (RAPID ACTING)  1-7 Units Subcutaneous TID  Lorenzo Don DO   2 Units at 01/12/25 1223    insulin aspart (NovoLOG) injection (RAPID ACTING)  1-5 Units Subcutaneous At Bedtime Lorenzo Don DO        insulin glargine (LANTUS PEN) injection 15 Units  15 Units Subcutaneous At Bedtime Brett Isidro MD   15 Units at 01/11/25 2116    isosorbide mononitrate (IMDUR) 24 hr tablet 30 mg  30 mg Oral Daily Angel De Leon MD   30 mg at 01/12/25 1219    latanoprost (XALATAN) 0.005 % ophthalmic solution 1 drop  1 drop Both Eyes At Bedtime Lorenzo Don DO   1 drop at 01/11/25 2141    levothyroxine (SYNTHROID/LEVOTHROID) tablet 137 mcg  137 mcg Oral QAM  Lorenzo Don DO   137 mcg at 01/12/25 0605    losartan (COZAAR) tablet 100 mg  100 mg Oral Daily Lorenzo Don DO   100 mg at 01/12/25 0744    metoprolol tartrate (LOPRESSOR) half-tab 12.5 mg  12.5 mg Oral BID Angel De Leon MD   12.5 mg at 01/12/25 1219    mirtazapine (REMERON) tablet 45 mg  45 mg Oral At Bedtime Lorenzo Don DO   45 mg at 01/11/25 2112    simvastatin (ZOCOR) tablet 20 mg  20 mg Oral At Bedtime Lorenzo Don DO   20 mg at 01/11/25 2113    sodium bicarbonate tablet 325 mg  325 mg Oral BID Lorenzo Don DO   325 mg at 01/12/25 0742    sodium chloride (PF) 0.9% PF flush 3 mL  3 mL Intracatheter Q8H Lorenzo Don DO   3 mL at  01/12/25 1224    vilazodone (VIIBRYD) tablet 20 mg  20 mg Oral Daily Lorenzo Don DO   20 mg at 01/11/25 2113       Data   Recent Labs   Lab 01/12/25  1218 01/12/25  0620 01/12/25  0326 01/11/25  0822 01/11/25  0354 01/10/25  0849 01/10/25  0737 01/09/25  2101 01/09/25  1613   WBC  --   --   --   --  6.5  --  5.7  --  5.4   HGB  --  9.7*  --   --  9.8*  --  10.5*  --  9.8*   MCV  --   --   --   --  87  --  85  --  86   PLT  --   --   --   --  269  --  292  --  278   NA  --  131*  --   --  134*  --  134*  --  135   POTASSIUM  --  4.6  --   --  4.9  --  5.1  --  5.1   CHLORIDE  --  99  --   --  101  --  100  --  102   CO2  --  22  --   --  25  --  20*  --  22   BUN  --  23.1*  --   --  23.0  --  24.7*  --  23.7*   CR  --  1.82*  --   --  1.88*  --  1.76*  --  1.70*   ANIONGAP  --  10  --   --  8  --  14  --  11   CHAR  --  9.5  --   --  9.5  --  9.6  --  9.7   * 129* 128*   < > 87   < > 132*   < > 165*    < > = values in this interval not displayed.       No results found for this or any previous visit (from the past 24 hours).      Assessment & Plan   Steve Morgan is a 64 year old male with a history of Htn, Hld, DM2, DAMIÁN, hypothyroidism who is admitted on 1/10/2025 with NSTEMI and multivessel disease requiring CV surgery consult.      NSTEMI  Severe 3V CAD  Hypertension  Dyslipidemia  Patient had abnormal dobutamine stress test on 1/9 and angiogram on 1/10 showing diffuse 3 vessel disease for which CV surgery consult was recommended. He has had progressive symptoms recently including worsening exertional chest pain and occasionally some pain at rest though he seems comfortable on admission. Given his need for hospitalization, progressive symptoms and severe 3V disease, heparin was started.. Baseline echo for dobutamine stress shows normal EF with borderline hypokinesis of the septum/apex  -continue heparin drip  -ASA 81 mg daily  -continue losartan, amlodipine and zocor  -Added Imdur 30 mg daily and  "metoprolol 12.5 mg twice daily by cardiology  -Appreciate CV surgery evaluation.  -Wean mapping, bilateral carotid ultrasound, ultrasound lower extremity and CT chest without contrast have been ordered  -Appreciate cardiology consult  -prn sublingual nitroglycerin available  -cardiac monitoring     DM2 neuropathy and nephropathy  Hgb A1C of 7.2 a week PTA.     -Decreased Lantus 15 units at bedtime(PTA 35 units)  -Sliding scale insulin aspart with carb controlled diet  -hold metformin and glipizide for now  -continue gabapentin 300 mg 3 times daily as needed     CKD stage IIIb  Hyponatremia  Baseline creatinine appears to be around 1.7  -monitor.  Ranging between 1.7-1.9.  -Sodium at 131.  Continue to monitor.  -Avoid nephrotoxic medications.  --Continue sodium bicarb 325 mg twice daily     Hypothyroidism  -continue PTA levothyroxine 137 mcg daily     BPH  -continue PTA proscar     Chronic anemia  Baseline hgb around 10  -monitor while on heparin    Depression  -on vilazodone       Clinically Significant Risk Factors         # Hyponatremia: Lowest Na = 131 mmol/L in last 2 days, will monitor as appropriate           # Hypertension: Noted on problem list           # DMII: A1C = 7.2 % (Ref range: 0.0 - 5.6 %) within past 6 months, PRESENT ON ADMISSION  # Obesity: Estimated body mass index is 38.39 kg/m  as calculated from the following:    Height as of 1/9/25: 1.651 m (5' 5\").    Weight as of this encounter: 104.6 kg (230 lb 11.2 oz)., PRESENT ON ADMISSION     # Financial/Environmental Concerns: none          DVT Prophylaxis: Heparin SQ  Code Status: Full Code  Medically Ready for Discharge: Anticipated in 2-4 Days      Please see A&P for additional details of medical decision making.  55 MINUTES SPENT BY ME on the date of service doing chart review, history, exam, documentation & further activities per the note.    Melania Rubio MD, MD  [phone](p)   "

## 2025-01-12 NOTE — PROGRESS NOTES
Cardiology Progress Note  Angel De Leon MD         Assessment and Plan:        Progressive angina with abnormal troponins, non-ST elevation MI  Recent abnormal dobutamine stress echocardiogram, coronary angiography reveals small caliber vessels with distal mid to distal diffuse disease in the small caliber LAD.  Small caliber posterior descending artery also has severe disease.  The OM branch has severe disease.  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  Will add long-acting nitrates  Awaiting CV surgery consult, they missed seeing the patient yesterday but have been contacted today and will come and see him.  If patient not candidate for bypass, may have to consider high risk PCI  Plan discussed with the patient.  If recurrent chest pain, resume IV heparin.                     Interval History:     Occasional chest tightness          Review of Systems:     The 5 point Review of Systems is negative other than noted in the HPI          Physical Exam:        Blood pressure (!) 159/89, pulse 67, temperature 97.6  F (36.4  C), temperature source Oral, resp. rate 20, weight 104.6 kg (230 lb 11.2 oz), SpO2 97%.  Vitals:    01/11/25 0537 01/12/25 0330   Weight: 103.9 kg (229 lb) 104.6 kg (230 lb 11.2 oz)     Weights since admission  Vitals:    01/11/25 0537 01/12/25 0330   Weight: 103.9 kg (229 lb) 104.6 kg (230 lb 11.2 oz)     Vital Signs with Ranges  Temp:  [97.4  F (36.3  C)-98.2  F (36.8  C)] 97.6  F (36.4  C)  Pulse:  [58-95] 67  Resp:  [16-20] 20  BP: ()/(53-89) 159/89  SpO2:  [95 %-98 %] 97 %  I/O's Last 24 hours  I/O last 3 completed shifts:  In: 1550 [P.O.:1550]  Out: -   Net I/O since admission  01/07 0700 - 01/12 0659  In: 1630.37 [P.O.:1550; I.V.:80.37]  Out: -   Net: 1630.37    EXAM:    Constitutional:   in no apparent distress     Neck:   no JVD     Cardiovascular:   normal  S1 and S2 and no murmur noted     Extremities and Back:   No edema     Neurological:   No gross or focal neurologic abnormalities            Medications:        Current Facility-Administered Medications   Medication Dose Route Frequency Provider Last Rate Last Admin    amLODIPine (NORVASC) tablet 5 mg  5 mg Oral Daily Lorenzo Don DO   5 mg at 01/11/25 1346    aspirin EC tablet 81 mg  81 mg Oral Daily Angel De Leon MD   81 mg at 01/12/25 0741    finasteride (PROSCAR) tablet 5 mg  5 mg Oral Daily Lorenzo Don DO   5 mg at 01/12/25 0743    insulin aspart (NovoLOG) injection (RAPID ACTING)  1-7 Units Subcutaneous TID AC Lorenzo Don DO   2 Units at 01/11/25 1802    insulin aspart (NovoLOG) injection (RAPID ACTING)  1-5 Units Subcutaneous At Bedtime Lorenzo Don DO        insulin glargine (LANTUS PEN) injection 15 Units  15 Units Subcutaneous At Bedtime Brett Isidro MD   15 Units at 01/11/25 2116    latanoprost (XALATAN) 0.005 % ophthalmic solution 1 drop  1 drop Both Eyes At Bedtime Lorenzo Don DO   1 drop at 01/11/25 2141    levothyroxine (SYNTHROID/LEVOTHROID) tablet 137 mcg  137 mcg Oral QAM AC Lorenzo Don DO   137 mcg at 01/12/25 0605    losartan (COZAAR) tablet 100 mg  100 mg Oral Daily Lorenzo Don DO   100 mg at 01/12/25 0744    mirtazapine (REMERON) tablet 45 mg  45 mg Oral At Bedtime Lorenzo Don DO   45 mg at 01/11/25 2112    simvastatin (ZOCOR) tablet 20 mg  20 mg Oral At Bedtime Lorenzo Don DO   20 mg at 01/11/25 2113    sodium bicarbonate tablet 325 mg  325 mg Oral BID Lorenzo Don DO   325 mg at 01/12/25 0742    sodium chloride (PF) 0.9% PF flush 3 mL  3 mL Intracatheter Q8H Lorenzo Don DO   3 mL at 01/12/25 0608    vilazodone (VIIBRYD) tablet 20 mg  20 mg Oral Daily Lorenzo Don DO   20 mg at 01/11/25 0308            Data:      All new lab and imaging data was reviewed.   Recent Labs   Lab Test 01/12/25  0620 01/11/25  0354 01/10/25  0737 01/09/25  3860  "  WBC  --  6.5 5.7 5.4   HGB 9.7* 9.8* 10.5* 9.8*   MCV  --  87 85 86   PLT  --  269 292 278      Recent Labs   Lab Test 01/12/25  0620 01/12/25  0326 01/11/25  2112 01/11/25  0822 01/11/25  0354 01/10/25  0849 01/10/25  0737   *  --   --   --  134*  --  134*   POTASSIUM 4.6  --   --   --  4.9  --  5.1   CHLORIDE 99  --   --   --  101  --  100   CO2 22  --   --   --  25  --  20*   BUN 23.1*  --   --   --  23.0  --  24.7*   CR 1.82*  --   --   --  1.88*  --  1.76*   ANIONGAP 10  --   --   --  8  --  14   CHAR 9.5  --   --   --  9.5  --  9.6   * 128* 174*   < > 87   < > 132*    < > = values in this interval not displayed.     No lab results found.    Invalid input(s): \"TROP\", \"TROPONINIES\"           Imaging:   No results found for this or any previous visit (from the past 24 hours).   "

## 2025-01-12 NOTE — PLAN OF CARE
[facility] Shift Note    Orientation: Alert & oriented X4  Vitals: VSS, No chest pain during shift.  Tele: SR/SB  Lines/Drains: Heparin infusing @ 1150. Re- check @ 10:30 am  Skin/Wounds: WDL. R Radial site good radial pulse,CMS intact. Baseline neuropathy unchanged.   GI/: WDL  Ambulation/Assist: with standby assist  Plan: Cardiology following and Cardiovascular to see pt today. Possible cab vs high risk PCI    Jeff Mensah RN

## 2025-01-13 LAB
ANION GAP SERPL CALCULATED.3IONS-SCNC: 9 MMOL/L (ref 7–15)
BUN SERPL-MCNC: 31.1 MG/DL (ref 8–23)
CALCIUM SERPL-MCNC: 9.4 MG/DL (ref 8.8–10.4)
CHLORIDE SERPL-SCNC: 92 MMOL/L (ref 98–107)
CHOLEST SERPL-MCNC: 127 MG/DL
CREAT SERPL-MCNC: 2.03 MG/DL (ref 0.67–1.17)
EGFRCR SERPLBLD CKD-EPI 2021: 35 ML/MIN/1.73M2
ERYTHROCYTE [DISTWIDTH] IN BLOOD BY AUTOMATED COUNT: 13 % (ref 10–15)
GLUCOSE BLDC GLUCOMTR-MCNC: 139 MG/DL (ref 70–99)
GLUCOSE BLDC GLUCOMTR-MCNC: 152 MG/DL (ref 70–99)
GLUCOSE BLDC GLUCOMTR-MCNC: 168 MG/DL (ref 70–99)
GLUCOSE BLDC GLUCOMTR-MCNC: 175 MG/DL (ref 70–99)
GLUCOSE BLDC GLUCOMTR-MCNC: 282 MG/DL (ref 70–99)
GLUCOSE SERPL-MCNC: 204 MG/DL (ref 70–99)
HCO3 SERPL-SCNC: 23 MMOL/L (ref 22–29)
HCT VFR BLD AUTO: 24.8 % (ref 40–53)
HDLC SERPL-MCNC: 32 MG/DL
HGB BLD-MCNC: 8.6 G/DL (ref 13.3–17.7)
LDLC SERPL CALC-MCNC: 65 MG/DL
MCH RBC QN AUTO: 29.1 PG (ref 26.5–33)
MCHC RBC AUTO-ENTMCNC: 34.7 G/DL (ref 31.5–36.5)
MCV RBC AUTO: 84 FL (ref 78–100)
NONHDLC SERPL-MCNC: 95 MG/DL
PLATELET # BLD AUTO: 258 10E3/UL (ref 150–450)
POTASSIUM SERPL-SCNC: 5.2 MMOL/L (ref 3.4–5.3)
RBC # BLD AUTO: 2.96 10E6/UL (ref 4.4–5.9)
SODIUM SERPL-SCNC: 124 MMOL/L (ref 135–145)
TRIGL SERPL-MCNC: 151 MG/DL
UFH PPP CHRO-ACNC: 0.28 IU/ML
UFH PPP CHRO-ACNC: 0.31 IU/ML
WBC # BLD AUTO: 7.5 10E3/UL (ref 4–11)

## 2025-01-13 PROCEDURE — 80048 BASIC METABOLIC PNL TOTAL CA: CPT | Performed by: INTERNAL MEDICINE

## 2025-01-13 PROCEDURE — 85520 HEPARIN ASSAY: CPT | Performed by: INTERNAL MEDICINE

## 2025-01-13 PROCEDURE — 250N000013 HC RX MED GY IP 250 OP 250 PS 637: Performed by: INTERNAL MEDICINE

## 2025-01-13 PROCEDURE — 99233 SBSQ HOSP IP/OBS HIGH 50: CPT | Performed by: PHYSICIAN ASSISTANT

## 2025-01-13 PROCEDURE — 85027 COMPLETE CBC AUTOMATED: CPT | Performed by: INTERNAL MEDICINE

## 2025-01-13 PROCEDURE — 210N000002 HC R&B HEART CARE

## 2025-01-13 PROCEDURE — 250N000013 HC RX MED GY IP 250 OP 250 PS 637: Performed by: PHYSICIAN ASSISTANT

## 2025-01-13 PROCEDURE — 80061 LIPID PANEL: CPT | Performed by: PHYSICIAN ASSISTANT

## 2025-01-13 PROCEDURE — 99232 SBSQ HOSP IP/OBS MODERATE 35: CPT | Performed by: INTERNAL MEDICINE

## 2025-01-13 PROCEDURE — 87081 CULTURE SCREEN ONLY: CPT | Performed by: INTERNAL MEDICINE

## 2025-01-13 PROCEDURE — 36415 COLL VENOUS BLD VENIPUNCTURE: CPT | Performed by: INTERNAL MEDICINE

## 2025-01-13 PROCEDURE — 250N000011 HC RX IP 250 OP 636: Performed by: INTERNAL MEDICINE

## 2025-01-13 RX ORDER — ROSUVASTATIN CALCIUM 20 MG/1
40 TABLET, COATED ORAL DAILY
Status: DISCONTINUED | OUTPATIENT
Start: 2025-01-13 | End: 2025-01-15 | Stop reason: HOSPADM

## 2025-01-13 RX ADMIN — SODIUM BICARBONATE 325 MG: 325 TABLET ORAL at 08:25

## 2025-01-13 RX ADMIN — INSULIN ASPART 1 UNITS: 100 INJECTION, SOLUTION INTRAVENOUS; SUBCUTANEOUS at 18:03

## 2025-01-13 RX ADMIN — LOSARTAN POTASSIUM 100 MG: 100 TABLET, FILM COATED ORAL at 08:25

## 2025-01-13 RX ADMIN — ROSUVASTATIN CALCIUM 40 MG: 20 TABLET, FILM COATED ORAL at 18:08

## 2025-01-13 RX ADMIN — AMLODIPINE BESYLATE 5 MG: 5 TABLET ORAL at 18:07

## 2025-01-13 RX ADMIN — ISOSORBIDE MONONITRATE 30 MG: 30 TABLET, EXTENDED RELEASE ORAL at 08:25

## 2025-01-13 RX ADMIN — VILAZODONE HYDROCHLORIDE 20 MG: 20 TABLET ORAL at 20:29

## 2025-01-13 RX ADMIN — ACETAMINOPHEN 650 MG: 325 TABLET, FILM COATED ORAL at 20:28

## 2025-01-13 RX ADMIN — MIRTAZAPINE 45 MG: 15 TABLET, FILM COATED ORAL at 21:39

## 2025-01-13 RX ADMIN — ASPIRIN 81 MG: 81 TABLET, COATED ORAL at 08:25

## 2025-01-13 RX ADMIN — HEPARIN SODIUM 1300 UNITS/HR: 10000 INJECTION, SOLUTION INTRAVENOUS at 04:41

## 2025-01-13 RX ADMIN — METOPROLOL TARTRATE 12.5 MG: 25 TABLET, FILM COATED ORAL at 08:25

## 2025-01-13 RX ADMIN — SODIUM BICARBONATE 325 MG: 325 TABLET ORAL at 20:28

## 2025-01-13 RX ADMIN — FINASTERIDE 5 MG: 5 TABLET, FILM COATED ORAL at 08:25

## 2025-01-13 RX ADMIN — LATANOPROST 1 DROP: 50 SOLUTION OPHTHALMIC at 21:39

## 2025-01-13 RX ADMIN — METOPROLOL TARTRATE 12.5 MG: 25 TABLET, FILM COATED ORAL at 20:28

## 2025-01-13 RX ADMIN — LEVOTHYROXINE SODIUM 137 MCG: 112 TABLET ORAL at 06:38

## 2025-01-13 ASSESSMENT — ACTIVITIES OF DAILY LIVING (ADL)
ADLS_ACUITY_SCORE: 62
ADLS_ACUITY_SCORE: 61
ADLS_ACUITY_SCORE: 62
ADLS_ACUITY_SCORE: 62
ADLS_ACUITY_SCORE: 61
ADLS_ACUITY_SCORE: 65
ADLS_ACUITY_SCORE: 61
ADLS_ACUITY_SCORE: 62
ADLS_ACUITY_SCORE: 61
ADLS_ACUITY_SCORE: 62
ADLS_ACUITY_SCORE: 61
ADLS_ACUITY_SCORE: 62
ADLS_ACUITY_SCORE: 62
ADLS_ACUITY_SCORE: 61
ADLS_ACUITY_SCORE: 62

## 2025-01-13 NOTE — PROGRESS NOTES
[facility]    Medicine Progress Note - Hospitalist Service    Date of Admission:  1/10/2025    Assessment & Plan   Steve Morgan is a 64 year old male with a history of Htn, Hld, DM2, DAMIÁN, hypothyroidism who is admitted on 1/10/2025 with NSTEMI and multivessel disease requiring CV surgery consult.      NSTEMI  Severe 3V CAD  Hypertension  Dyslipidemia  Patient had abnormal dobutamine stress test on 1/9 and angiogram on 1/10 showing diffuse 3 vessel disease for which CV surgery consult was recommended. He has had progressive symptoms recently including worsening exertional chest pain and occasionally some pain at rest though he seems comfortable on admission. Given his need for hospitalization, progressive symptoms and severe 3V disease, heparin was started.. Baseline echo for dobutamine stress shows normal EF with borderline hypokinesis of the septum/apex  -continue heparin drip  -ASA 81 mg daily  -continue losartan, amlodipine.   -Added Imdur 30 mg daily and metoprolol 12.5 mg twice daily by cardiology. Zocor changed to rosuvastatin 1/13  -Appreciate CV surgery evaluation.  No plan for complex angiogram and stenting later today     DM2 neuropathy and nephropathy  Hgb A1C of 7.2 a week PTA.      -Decreased Lantus 15 units at bedtime(PTA 35 units)  -Sliding scale insulin aspart with carb controlled diet  -hold metformin and glipizide for now  -continue gabapentin 300 mg 3 times daily as needed     Recent Labs   Lab 01/13/25  0801 01/13/25  0201 01/12/25  2107 01/12/25  1652 01/12/25  1218 01/12/25  0620   * 152* 254* 231* 198* 129*       CKD stage IIIb  Hyponatremia  Baseline creatinine appears to be around 1.7  -monitor.  Ranging between 1.7-1.9.  -Sodium at 131.  Continue to monitor.  -Avoid nephrotoxic medications.  --Continue sodium bicarb 325 mg twice daily     Hypothyroidism  -continue PTA levothyroxine 137 mcg daily     BPH  -continue PTA proscar     Chronic anemia  Baseline  "hgb around 10  -monitor while on heparin     Depression  -on vilazodone         Diet: NPO for Medical/Clinical Reasons Except for: Meds, Ice Chips    DVT Prophylaxis: heparin  [place] Catheter: Not present  Lines: None     Cardiac Monitoring: ACTIVE order. Indication: AMI (NSTEMI/ STEMI) (48 hours)  Code Status: Full Code      Clinically Significant Risk Factors         # Hyponatremia: Lowest Na = 131 mmol/L in last 2 days, will monitor as appropriate           # Hypertension: Noted on problem list             # DMII: A1C = 7.2 % (Ref range: 0.0 - 5.6 %) within past 6 months, PRESENT ON ADMISSION  # Obesity: Estimated body mass index is 38.69 kg/m  as calculated from the following:    Height as of 1/9/25: 1.651 m (5' 5\").    Weight as of this encounter: 105.5 kg (232 lb 8 oz)., PRESENT ON ADMISSION     # Financial/Environmental Concerns: none         Social Drivers of Health    Depression: Not at risk (1/6/2025)    PHQ-2     PHQ-2 Score: 2   Recent Concern: Depression - At risk (11/21/2024)    PHQ-2     PHQ-2 Score: 4   Physical Activity: Inactive (7/5/2024)    Exercise Vital Sign     Days of Exercise per Week: 0 days     Minutes of Exercise per Session: 0 min          Disposition Plan     Medically Ready for Discharge: Anticipated in 2-4 Days         Brett Isidro MD  Hospitalist Service  [facility]  Securely message with [employer] (more info)  Text page via [employer] Paging/Directory   \"This dictation was performed with voice recognition software and may contain errors,  omissions and inadvertent word substitution.\"    ______________________________________________________________________    Interval History   Last 24-hour events noted.  No further chest pain since yesterday morning .denies any palpitations or shortness of breath.    Physical Exam   /86 (BP Location: Left arm)   Pulse 61   Temp 97.4  F (36.3  C) (Oral)   Resp 16   Wt 105.5 kg (232 lb 8 oz)   SpO2 96%   BMI 38.69 kg/m  "   Gen- pleasant   Neck- supple  CVS- I+II+ no m/r/g  RS- CTABS  Abdo- soft, no tenderness . No g/r/r  Ext- no edema       Medical Decision Making       40 MINUTES SPENT BY ME on the date of service doing chart review, history, exam, documentation & further activities per the note.  Review of cardiology note, cardiothoracic surgery notes    Data   ------------------------- PAST 24 HR DATA REVIEWED -----------------------------------------------    I have personally reviewed the following data over the past 24 hrs:    7.5  \   8.6 (L)   / 258     N/A N/A N/A /  139 (H)   N/A N/A N/A \       Imaging results reviewed over the past 24 hrs:   Recent Results (from the past 24 hours)   CT Chest w/o Contrast    Narrative    EXAM: CT CHEST W/O CONTRAST  LOCATION: [facility]  DATE: 1/12/2025    INDICATION: pre op for poss CABG, ascending aortic calcium  COMPARISON: None.  TECHNIQUE: CT chest without IV contrast. Multiplanar reformats were obtained. Dose reduction techniques were used.  CONTRAST: None.    FINDINGS:   LUNGS AND PLEURA: Tiny benign calcified granulomas in the right lung.    MEDIASTINUM/AXILLAE: Scattered nonpathologic sized lymph nodes. Minimal calcification involving the ascending thoracic aorta just to the right of the takeoff of the right coronary artery.     CORONARY ARTERY CALCIFICATION: Moderate.    UPPER ABDOMEN: The gallbladder is of increased density which may represent recent contrast or changes of sludge. There is mild cholelithiasis. No kasandra CT evidence to suggest acute cholecystitis. The bile ducts are normal in caliber.     MUSCULOSKELETAL: Mild scattered hypertrophic changes in the spine.      Impression    IMPRESSION:   1.  Minimal calcification ascending thoracic aorta.    2.  Residual contrast versus sludge and stones seen in the gallbladder with no kasandra CT evidence for cholecystitis.    3.  Moderate coronary artery calcification.    4.  Tiny benign calcified  granulomas in the right lung.     US Carotid Bilateral    Narrative    EXAM: US CAROTID BILATERAL  LOCATION: [facility]  DATE: 1/12/2025    INDICATION: Pre op for poss CABG, carotid bruit  COMPARISON: None.  TECHNIQUE: Duplex exam performed utilizing 2D gray-scale imaging, Doppler interrogation with color-flow and spectral waveform analysis. The percent diameter stenosis is determined using Updated Recommendations for Carotid Stenosis Interpretation Criteria   from [employer] [facility].    FINDINGS:    RIGHT: Mild plaque at the bifurcation. The peak systolic velocity in the ICA is less than 180 cm/sec, consistent with less than 50% stenosis. Normal velocities in the ECA. Antegrade flow within the vertebral artery.     LEFT: Mild plaque at the bifurcation. The peak systolic velocity in the ICA is less than 180 cm/sec, consistent with less than 50% stenosis. Normal velocities in the ECA. Antegrade flow within the vertebral artery.    VELOCITY CHART:  CCA   Right: 111/22 cm/s   Left: 110/13 cm/s  ICA   Right: 144/46 cm/s   Left: 113/35 cm/s  ECA   Right: 144/46 cm/s   Left: 113/35 cm/s  ICA/CCA PSV Ratio   Right: 1.3   Left: 1.0      Impression    IMPRESSION:  1.  Mild plaque formation, velocities consistent with less than 50% stenosis in the right internal carotid artery.  2.  Mild plaque formation, velocities consistent with less than 50% stenosis in the left internal carotid artery.  3.  Flow within the vertebral arteries is antegrade.   US Upper Extremity Arterial Duplex Left    Narrative    EXAM: US UPPER EXTREMITY ARTERIAL DUPLEX LEFT  LOCATION: [facility]  DATE: 1/12/2025    INDICATION: Pre op for poss CABG, upper extremity pain.  COMPARISON: None.  TECHNIQUE: Arterial Duplex ultrasound of the left arm. Color flow and spectral Doppler with waveform analysis performed.    FINDINGS (LEFT upper extremity):    ARTERIAL PEAK SYSTOLIC VELOCITIES (cm/s):  Brachial  (distal): 120  Radial: 101  Ulnar: 106     Radial artery diameters (mm):  Proximal: 3.9 mm   Proximal Mid: 3.4 mm  Mid: 3.5 mm  Mid Distal: 3.4 mm  Distal: 3.2 mm    WAVEFORMS/COLOR DOPPLER: Multiphasic      Impression    IMPRESSION:   1.  Patent left radial artery with multiphasic waveforms. Measurements as noted above.   US Lower Extremity Venous Mapping Bilateral    Narrative    EXAM: US LOWER EXTREMITY VENOUS MAPPING BILATERAL  LOCATION: [facility]  DATE: 1/12/2025    INDICATION: pre op for poss CABG; lower extremity pain with pain/swelling.  COMPARISON: None.  TECHNIQUE: Ultrasound examination of the lower extremity veins was performed, including gray-scale and compression imaging.     LOWER  EXTREMITY FINDINGS:       VENOUS DIAMETERS  RIGHT GREAT SAPHENOUS VEIN  Junction: 3.8 mm  Prox Thigh: 3.0 mm  Prox/Mid Thigh: 2.9 mm  Mid Thigh: 3.5 mm  Mid/Dist Thigh: 2.9 mm  Dist Thigh: 2.6 mm  Knee: 3.3 mm  Prox Calf: 3.0 mm  Prox/Mid Calf: 2.1 mm  Mid Calf: 2.9 mm  Mid/Dist Calf: 2.7 mm  Dist Calf: 3.0 mm  Ankle: 2.8 mm    LEFT GREAT SAPHENOUS VEIN  Junction: 5.5 mm  Prox Thigh: 4.4 mm  Prox/Mid Thigh: 4.0 mm  Mid Thigh: 3.6 mm  Mid/Dist Thigh: 2.9 mm  Dist Thigh: 3.0 mm  Knee: 3.5 mm  Prox Calf: 3.4 mm  Prox/Mid Calf: 3.0 mm  Mid Calf: 3.1 mm  Mid/Dist Calf: 3.0 mm  Dist Calf: 3.2 mm  Ankle: 3.6 mm      Impression    IMPRESSION:  1. Patent bilateral greater saphenous veins with measurements as noted above.

## 2025-01-13 NOTE — PLAN OF CARE
PMH: See H&P.    Admit: 1/10 with NSTEMI & multivessel disease.     Pain/comfort: Denies: chest pain, nausea, SOB. Endorsed brief episodes of chest pain during previous shift - see note. At bedtime, patient c/o mild headache (PRN tylenol given) and reported having brief episode of mild lightheadedness while in bed that resolved - BP WDL, sx monitored.    Assessment: Alert & oriented x4. Vital signs stable on room air. Neuro status WDL except: BUE tremor (baseline), generalized weakness, neuropathy in b/l hands and BLE - knees to feet. Missing digits right hand. Telemetry shows sinus rhythm. Heart tones WDL. Right radial site c/d/I with slight ecchymosis - distal pulse & CMS intact. Anterior/posterior lung sounds clear. Refusing CPAP per report. Bowel sounds audible. Continent of bowel & bladder. 1 small BM this shift. PRN senna given at HS. Skin pale & dry. Multiple imaging done this AM, see results. Plan: CABG vs PCI tomorrow. Metoprolol/imdur added today per report, watch for bradycardia. Heparin Xa recheck at 02:13.     SBA GB. Mod carb/thin - pills whole with water. Left AC PIV - heparin infusing at 1300 units/hr. Right PIV - SL.     Ongoing cardiac monitoring. Vitals/assessment per unit protocol. Medications administered as ordered. Education regarding plan of care.     Iris Kothari RN on 1/12/2025 at 9:07 PM

## 2025-01-13 NOTE — PROGRESS NOTES
[facility]  Cardiology Progress Note    Date of Service (when I saw the patient): 01/13/2025  Primary Cardiologist: No prior cardiology care     Interval History:   Denies chest pain today.  Agreeable for PCI today after CABG turndown.    ----------------------------------------------------------------------------------------    Assessment:  Steve Morgan is a 64 year old male who was transferred from [facility] on 1/10/2025 for discussion of CABG versus high risk PCI.  He was admitted initially after abnormal dobutamine stress echocardiogram.    #NSTEMI  -Recent abnormal dobutamine stress echocardiogram as well as coronary angiogram [facility] demonstrating small caliber multivessel disease likely secondary to poor controlled diabetes mellitus-there appears to be severe OM branch disease as well as PDA  -- TTE dated 12/20/2024 shows preserved LVEF but possible mobile echodensity on aortic valve which is most consistent with nodular calcification     #Hyperlipidemia  -Statin regimen includes simvastatin 20 mg daily with baseline LDL of 130    #Longstanding history of T2DM with nephropathy  -hemoglobin A1c 7.2% in the setting of hemoglobin around 8    #Chronic anemia  - Stable between 8 and 9  - No reports of active bleeding    #CKD  - Stable with Cr 1.8    # Morbid obesity     # DAMIÁN    ----------------------------------------------------------------------------------------    Plan:  -plan for PCI today (consent obtained and risk and benefits discussed with patient; he reports having breakfast around 8:30 AM and thus we will plan for his coronary angiogram later this afternoon after 2 PM; patient is full code)  - Repeat baseline lipid panel during current admission  - Discontinue simvastatin and initiate rosuvastatin 40 mg daily  - Continue with beta-blocker and aspirin therapy    Addendum: BMP this afternoon showed [place]. CBC also shows slight down trend in  Hgb. We will cancel today's plan for coronary angiogram.         ----------------------------------------------------------------------------------------  Physical Exam   Temp: 97.4  F (36.3  C) Temp src: Oral BP: 139/86 Pulse: 61   Resp: 16 SpO2: 96 % O2 Device: None (Room air)    Vitals:    01/11/25 0537 01/12/25 0330 01/13/25 0427   Weight: 103.9 kg (229 lb) 104.6 kg (230 lb 11.2 oz) 105.5 kg (232 lb 8 oz)     GEN:  NAD  HEENT: Mucous membranes moist.  NECK:  No JVD.  C/V:  Regular rate and rhythm, no murmur, rub or gallop.   RESP: CTA, lamine  GI: Abdomen soft, nontender, nondistended.    EXTREM: No pitting LE edema.   NEURO: Alert and oriented, cooperative.   PSYCH: Normal affect.  SKIN: Warm and dry.   VASC: 2+ radial and dorsalis pedis pulses bilaterally.      Medications   Current Facility-Administered Medications   Medication Dose Route Frequency Provider Last Rate Last Admin    heparin 25,000 units in 0.45% NaCl 250 mL ANTICOAGULANT infusion  0-5,000 Units/hr Intravenous Continuous Lorenzo Don DO 13 mL/hr at 01/13/25 0441 1,300 Units/hr at 01/13/25 0441    Patient is already receiving anticoagulation with heparin, enoxaparin (LOVENOX), warfarin (COUMADIN)  or other anticoagulant medication   Does not apply Continuous PRN Lorenzo Don DO         Current Facility-Administered Medications   Medication Dose Route Frequency Provider Last Rate Last Admin    amLODIPine (NORVASC) tablet 5 mg  5 mg Oral Daily Lorenzo Don DO   5 mg at 01/12/25 1431    aspirin EC tablet 81 mg  81 mg Oral Daily Angel De Leon MD   81 mg at 01/13/25 0825    finasteride (PROSCAR) tablet 5 mg  5 mg Oral Daily Lorenzo Don DO   5 mg at 01/13/25 0825    insulin aspart (NovoLOG) injection (RAPID ACTING)  1-7 Units Subcutaneous TID AC Lorenzo Don DO   2 Units at 01/12/25 1725    insulin aspart (NovoLOG) injection (RAPID ACTING)  1-5 Units Subcutaneous At Bedtime Lorenzo Don DO   2 Units at 01/12/25 2208    insulin  glargine (LANTUS PEN) injection 15 Units  15 Units Subcutaneous At Bedtime Brett Isidro MD   15 Units at 01/12/25 2208    isosorbide mononitrate (IMDUR) 24 hr tablet 30 mg  30 mg Oral Daily Angel De Leon MD   30 mg at 01/13/25 0825    latanoprost (XALATAN) 0.005 % ophthalmic solution 1 drop  1 drop Both Eyes At Bedtime Lorenzo Don DO   1 drop at 01/12/25 2204    levothyroxine (SYNTHROID/LEVOTHROID) tablet 137 mcg  137 mcg Oral QAM AC Lorenzo Don DO   137 mcg at 01/13/25 0638    losartan (COZAAR) tablet 100 mg  100 mg Oral Daily Lorenzo Don DO   100 mg at 01/13/25 0825    metoprolol tartrate (LOPRESSOR) half-tab 12.5 mg  12.5 mg Oral BID Angel De Leon MD   12.5 mg at 01/13/25 0825    mirtazapine (REMERON) tablet 45 mg  45 mg Oral At Bedtime Lorenzo Don DO   45 mg at 01/12/25 2202    simvastatin (ZOCOR) tablet 20 mg  20 mg Oral At Bedtime Lorenzo Don, DO   20 mg at 01/12/25 2204    sodium bicarbonate tablet 325 mg  325 mg Oral BID Lorenzo Don DO   325 mg at 01/13/25 0825    sodium chloride (PF) 0.9% PF flush 3 mL  3 mL Intracatheter Q8H Lorenzo Don DO   3 mL at 01/12/25 2207    vilazodone (VIIBRYD) tablet 20 mg  20 mg Oral Daily Lorenzo Don DO   20 mg at 01/12/25 2203       Data   reviewed      Hank Limon PA-C   1/13/2025  Pager: [place]

## 2025-01-13 NOTE — PROGRESS NOTES
Infection Prevention Progress Note  1/13/2025      Patient Name: Steve Morgan [id number]  Admit Date: 1/10/2025    Infection Status as of 1/13/2025 9:12 AM: MRSA  Isolation Status as of 1/13/2025 9:12 AM: Contact     MDRO Discontinuation  Infection Prevention has reviewed this patient's chart per the MDRO D/C Policy and have taken the following action:    The patient does not qualify for discontinuation as patient does not have the required negative results. When non-qualifying reason(s) no longer apply, please contact Infection Prevention for re-evaluation.      Patient requires one consecutive negative cultures from nares prior to continuing discontinuation evaluation. Surveillance culture orders placed, date: 1.13.2025    Contact Precautions ordered for the following MDRO(s): MRSA    If you have any questions, please contact Infection Prevention.    Óscar Hancock, Infection Prevention

## 2025-01-13 NOTE — DISCHARGE SUMMARY
"[facility]  Hospitalist Discharge Summary      Date of Admission:  1/9/2025  Date of Discharge:  1/10/2025  8:06 PM  Discharging Provider: Tonya Dietz DO  Discharge Service: Hospitalist Service    Discharge Diagnoses   Multivessel coronary disease    Clinically Significant Risk Factors     # DMII: A1C = 7.2 % (Ref range: 0.0 - 5.6 %) within past 6 months  # Obesity: Estimated body mass index is 38.91 kg/m  as calculated from the following:    Height as of this encounter: 1.651 m (5' 5\").    Weight as of this encounter: 106.1 kg (233 lb 12.8 oz).       Follow-ups Needed After Discharge   Patient transferred without the hospital for cardiovascular surgery evaluation.  Follow-up per [facility] team    Unresulted Labs Ordered in the Past 30 Days of this Admission       No orders found from 12/10/2024 to 1/10/2025.            Discharge Disposition   Transferred to [facility]  Condition at discharge: Stable    Hospital Course   Patient is a 64-year-old male who initially presented with abnormal stress test.  Underwent angiogram which revealed multivessel coronary disease.  Cardiology requested transfer to [facility] for cardiovascular surgery evaluation.  Transfer was initiated and patient was transferred to [facility] in stable condition.    Consultations This Hospital Stay   PHARMACY IP CONSULT  CARE MANAGEMENT / SOCIAL WORK IP CONSULT  PHARMACY IP CONSULT  CARDIOTHORACIC SURGERY IP CONSULT  SMOKING CESSATION PROGRAM IP CONSULT    Code Status   Full Code    Time Spent on this Encounter   I, Tonya Dietz DO, personally saw the patient today and spent greater than 30 minutes discharging this patient.       DO ISAIAH Encarnacion [facility] 3 MEDICAL SURGICAL  [address] [place] [address]  Phone: [phone]  Fax: [phone]  ______________________________________________________________________    Physical Exam   Vital " Signs:                    Weight: 233 lbs 12.8 oz         Primary Care Physician   Physician No Ref-Primary    Discharge Orders   No discharge procedures on file.    Significant Results and Procedures   Most Recent 3 CBC's:  Recent Labs   Lab Test 01/13/25  0240 01/12/25  0620 01/11/25  0354 01/10/25  0737   WBC 7.5  --  6.5 5.7   HGB 8.6* 9.7* 9.8* 10.5*   MCV 84  --  87 85     --  269 292     Most Recent 3 BMP's:  Recent Labs   Lab Test 01/13/25  0201 01/12/25  2107 01/12/25  1652 01/12/25  1218 01/12/25  0620 01/11/25  0822 01/11/25  0354 01/10/25  0849 01/10/25  0737   NA  --   --   --   --  131*  --  134*  --  134*   POTASSIUM  --   --   --   --  4.6  --  4.9  --  5.1   CHLORIDE  --   --   --   --  99  --  101  --  100   CO2  --   --   --   --  22  --  25  --  20*   BUN  --   --   --   --  23.1*  --  23.0  --  24.7*   CR  --   --   --   --  1.82*  --  1.88*  --  1.76*   ANIONGAP  --   --   --   --  10  --  8  --  14   CHAR  --   --   --   --  9.5  --  9.5  --  9.6   * 254* 231*   < > 129*   < > 87   < > 132*    < > = values in this interval not displayed.     Most Recent 2 LFT's:  Recent Labs   Lab Test 07/05/24  0815 05/14/24  1303   AST 24 25   ALT 19 27   ALKPHOS 76 72   BILITOTAL 0.2 0.2   ,   Results for orders placed or performed during the hospital encounter of 01/09/25   XR Chest 2 Views    Narrative    EXAM: XR CHEST 2 VIEWS  LOCATION: [facility]  DATE: 1/9/2025    INDICATION: Chest pain.  COMPARISON: Chest radiograph 11/22/2024.      Impression    IMPRESSION:     Lungs are clear. No pleural effusions or pneumothorax. Normal pulmonary vascularity.    Normal cardiac silhouette.   Cardiac Catheterization    Narrative    Extensive multivessel CAD as described below.  Unfortunately, majority of   disease is distal vessel, with generally suboptimal graft targets.  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  The OM branch has a 90% stenosis proximally.  RCA: This is the dominant vessel and is relatively healthy within the RCA   itself other than a moderate focal mid RCA stenosis.  However, the RPDA   has a 90% proximal stenosis with moderate diffuse disease elsewhere.  The   RPL system is relatively healthy mild-moderate diffuse disease.      Recommend transfer to [facility] for multidisciplinary   evaluation regarding revascularization (CABG versus PCI).  Given extensive   coronary disease, CABG would typically be preferred, but unfortunately   distal graft targets (particularly within the LAD) are very poor in this   case.       *Note: Due to a large number of results and/or encounters for the requested time period, some results have not been displayed. A complete set of results can be found in Results Review.       Discharge Medications   Discharge Medication List as of 1/10/2025  8:15 PM        CONTINUE these medications which have NOT CHANGED    Details   acetaminophen (TYLENOL) 325 MG tablet Take 2 tablets (650 mg) by mouth every 4 hours as needed for other (For optimal non-opioid multimodal pain management to improve pain control.), OTC      amLODIPine (NORVASC) 5 MG tablet Take 1 tablet by mouth daily at 2 pm., Historical      Calcium Carb-Cholecalciferol (CALCIUM 600 + D PO) Take 1 tablet by mouth daily, Historical      finasteride (PROSCAR) 5 MG tablet Take 1 tablet (5 mg) by mouth daily., Disp-90 tablet, R-1, E-Prescribe      gabapentin (NEURONTIN) 300 MG capsule Take 1 capsule (300 mg) by mouth 3 times daily, Disp-270 capsule, R-1, E-Prescribe      glipiZIDE (GLUCOTROL XL) 10 MG 24 hr tablet TAKE 1 TABLET (10 MG) BY MOUTH DAILY.,  Disp-90 tablet, R-7, E-Prescribe      insulin glargine 100 UNIT/ML pen Inject 35 Units subcutaneously daily. DOSE CHANGE, Disp-30 mL, R-2, E-PrescribeIf Lantus is not covered by insurance, may substitute Basaglar or Semglee or other insulin glargine product per insurance preference at same dose and frequency.        latanoprost (XALATAN) 0.005 % ophthalmic solution INSTILL 1 DROP INTO BOTH EYES IN THE EVENING, Historical      LEVOXYL 137 MCG tablet Take 1 tablet (137 mcg) by mouth daily., Disp-90 tablet, R-3, KAMERON, E-Prescribe      losartan (COZAAR) 100 MG tablet Take 1 tablet (100 mg) by mouth daily., Disp-90 tablet, R-1, E-Prescribe      metFORMIN (GLUCOPHAGE) 1000 MG tablet Take 1 tablet (1,000 mg) by mouth 2 times daily (with meals)., Disp-180 tablet, R-0, E-Prescribe      mirtazapine (REMERON) 45 MG tablet Take 1 tablet (45 mg) by mouth at bedtime., Disp-90 tablet, R-0, E-Prescribe      MULTI-VITAMIN OR TABS Take 1 tablet by mouth daily, Disp-30, R-0, Historical      senna-docusate (SENOKOT-S/PERICOLACE) 8.6-50 MG tablet Take 1 tablet by mouth 2 times daily as needed for constipation, Disp-20 tablet, R-0, E-Prescribe      simvastatin (ZOCOR) 20 MG tablet Take 20 mg by mouth daily, Historical      sodium bicarbonate 325 MG tablet Take 325 mg by mouth 2 times daily., Historical      vilazodone (VIIBRYD) 20 MG TABS tablet Take 1 tablet (20 mg) by mouth daily., Disp-30 tablet, R-1, E-Prescribe      Continuous Glucose Sensor (FREESTYLE GIULIANA 2 SENSOR) MISC Inject 1 each subcutaneously every 14 days. Use 1 sensor every 14 days. Use to read blood sugars per 's instructions., Disp-6 each, R-0, E-Prescribe      insulin pen needle (B-D U/F) 31G X 5 MM miscellaneous USE 1 DAILY OR AS DIRECTED.Disp-100 each, [id number]           Allergies   No Known Allergies

## 2025-01-13 NOTE — PLAN OF CARE
Goal Outcome Evaluation:                 Outcome Evaluation: Orientations: AOx4  Vitals/Pain: VSS RA - Denies pain  Tele: NSR  Lines/Drains: PIV infusing hep gtt @1300 units/hr. Recheck scheduled for 9:30am  Skin/Wounds: Intact  GI/: Continent  Labs: Abnormal/Trends, Electrolyte Replacement- See results  Ambulation/Assist: SBA  Plan: CV surgery to see regarding PCI vs CABG today

## 2025-01-14 LAB
ACT BLD: 259 SECONDS (ref 74–150)
ACT BLD: 280 SECONDS (ref 74–150)
ANION GAP SERPL CALCULATED.3IONS-SCNC: 10 MMOL/L (ref 7–15)
ATRIAL RATE - MUSE: 53 BPM
BUN SERPL-MCNC: 29.3 MG/DL (ref 8–23)
CALCIUM SERPL-MCNC: 9.5 MG/DL (ref 8.8–10.4)
CHLORIDE SERPL-SCNC: 96 MMOL/L (ref 98–107)
CREAT SERPL-MCNC: 1.91 MG/DL (ref 0.67–1.17)
DIASTOLIC BLOOD PRESSURE - MUSE: NORMAL MMHG
EGFRCR SERPLBLD CKD-EPI 2021: 38 ML/MIN/1.73M2
ERYTHROCYTE [DISTWIDTH] IN BLOOD BY AUTOMATED COUNT: 13 % (ref 10–15)
GLUCOSE BLDC GLUCOMTR-MCNC: 104 MG/DL (ref 70–99)
GLUCOSE BLDC GLUCOMTR-MCNC: 157 MG/DL (ref 70–99)
GLUCOSE BLDC GLUCOMTR-MCNC: 173 MG/DL (ref 70–99)
GLUCOSE BLDC GLUCOMTR-MCNC: 207 MG/DL (ref 70–99)
GLUCOSE BLDC GLUCOMTR-MCNC: 208 MG/DL (ref 70–99)
GLUCOSE SERPL-MCNC: 164 MG/DL (ref 70–99)
HCO3 SERPL-SCNC: 22 MMOL/L (ref 22–29)
HCT VFR BLD AUTO: 26.7 % (ref 40–53)
HGB BLD-MCNC: 9.5 G/DL (ref 13.3–17.7)
INTERPRETATION ECG - MUSE: NORMAL
MCH RBC QN AUTO: 29.8 PG (ref 26.5–33)
MCHC RBC AUTO-ENTMCNC: 35.6 G/DL (ref 31.5–36.5)
MCV RBC AUTO: 84 FL (ref 78–100)
P AXIS - MUSE: 43 DEGREES
PLATELET # BLD AUTO: 272 10E3/UL (ref 150–450)
POTASSIUM SERPL-SCNC: 4.9 MMOL/L (ref 3.4–5.3)
PR INTERVAL - MUSE: 182 MS
QRS DURATION - MUSE: 84 MS
QT - MUSE: 410 MS
QTC - MUSE: 384 MS
R AXIS - MUSE: -21 DEGREES
RBC # BLD AUTO: 3.19 10E6/UL (ref 4.4–5.9)
SODIUM SERPL-SCNC: 128 MMOL/L (ref 135–145)
SYSTOLIC BLOOD PRESSURE - MUSE: NORMAL MMHG
T AXIS - MUSE: -22 DEGREES
VENTRICULAR RATE- MUSE: 53 BPM
WBC # BLD AUTO: 6.5 10E3/UL (ref 4–11)

## 2025-01-14 PROCEDURE — C1725 CATH, TRANSLUMIN NON-LASER: HCPCS | Performed by: INTERNAL MEDICINE

## 2025-01-14 PROCEDURE — 92978 ENDOLUMINL IVUS OCT C 1ST: CPT | Performed by: INTERNAL MEDICINE

## 2025-01-14 PROCEDURE — 36415 COLL VENOUS BLD VENIPUNCTURE: CPT | Performed by: STUDENT IN AN ORGANIZED HEALTH CARE EDUCATION/TRAINING PROGRAM

## 2025-01-14 PROCEDURE — 92978 ENDOLUMINL IVUS OCT C 1ST: CPT | Mod: 26 | Performed by: INTERNAL MEDICINE

## 2025-01-14 PROCEDURE — 258N000003 HC RX IP 258 OP 636: Performed by: STUDENT IN AN ORGANIZED HEALTH CARE EDUCATION/TRAINING PROGRAM

## 2025-01-14 PROCEDURE — 93010 ELECTROCARDIOGRAM REPORT: CPT | Mod: XU | Performed by: INTERNAL MEDICINE

## 2025-01-14 PROCEDURE — 250N000011 HC RX IP 250 OP 636: Performed by: INTERNAL MEDICINE

## 2025-01-14 PROCEDURE — 84520 ASSAY OF UREA NITROGEN: CPT | Performed by: STUDENT IN AN ORGANIZED HEALTH CARE EDUCATION/TRAINING PROGRAM

## 2025-01-14 PROCEDURE — 92928 PRQ TCAT PLMT NTRAC ST 1 LES: CPT | Mod: RC | Performed by: INTERNAL MEDICINE

## 2025-01-14 PROCEDURE — 93454 CORONARY ARTERY ANGIO S&I: CPT

## 2025-01-14 PROCEDURE — C1887 CATHETER, GUIDING: HCPCS | Performed by: INTERNAL MEDICINE

## 2025-01-14 PROCEDURE — 250N000013 HC RX MED GY IP 250 OP 250 PS 637: Performed by: STUDENT IN AN ORGANIZED HEALTH CARE EDUCATION/TRAINING PROGRAM

## 2025-01-14 PROCEDURE — 99152 MOD SED SAME PHYS/QHP 5/>YRS: CPT | Performed by: INTERNAL MEDICINE

## 2025-01-14 PROCEDURE — C1753 CATH, INTRAVAS ULTRASOUND: HCPCS | Performed by: INTERNAL MEDICINE

## 2025-01-14 PROCEDURE — C9601 PERC DRUG-EL COR STENT BRAN: HCPCS | Mod: RC | Performed by: INTERNAL MEDICINE

## 2025-01-14 PROCEDURE — C1874 STENT, COATED/COV W/DEL SYS: HCPCS | Performed by: INTERNAL MEDICINE

## 2025-01-14 PROCEDURE — 250N000013 HC RX MED GY IP 250 OP 250 PS 637: Performed by: INTERNAL MEDICINE

## 2025-01-14 PROCEDURE — 250N000009 HC RX 250: Performed by: INTERNAL MEDICINE

## 2025-01-14 PROCEDURE — 99153 MOD SED SAME PHYS/QHP EA: CPT | Performed by: INTERNAL MEDICINE

## 2025-01-14 PROCEDURE — 93005 ELECTROCARDIOGRAM TRACING: CPT

## 2025-01-14 PROCEDURE — 85018 HEMOGLOBIN: CPT | Performed by: STUDENT IN AN ORGANIZED HEALTH CARE EDUCATION/TRAINING PROGRAM

## 2025-01-14 PROCEDURE — 99233 SBSQ HOSP IP/OBS HIGH 50: CPT | Performed by: INTERNAL MEDICINE

## 2025-01-14 PROCEDURE — 272N000001 HC OR GENERAL SUPPLY STERILE: Performed by: INTERNAL MEDICINE

## 2025-01-14 PROCEDURE — 85048 AUTOMATED LEUKOCYTE COUNT: CPT | Performed by: STUDENT IN AN ORGANIZED HEALTH CARE EDUCATION/TRAINING PROGRAM

## 2025-01-14 PROCEDURE — 82310 ASSAY OF CALCIUM: CPT | Performed by: STUDENT IN AN ORGANIZED HEALTH CARE EDUCATION/TRAINING PROGRAM

## 2025-01-14 PROCEDURE — 258N000003 HC RX IP 258 OP 636: Performed by: PHYSICIAN ASSISTANT

## 2025-01-14 PROCEDURE — 210N000002 HC R&B HEART CARE

## 2025-01-14 PROCEDURE — 99233 SBSQ HOSP IP/OBS HIGH 50: CPT | Mod: 25 | Performed by: PHYSICIAN ASSISTANT

## 2025-01-14 PROCEDURE — 99152 MOD SED SAME PHYS/QHP 5/>YRS: CPT | Mod: GC | Performed by: INTERNAL MEDICINE

## 2025-01-14 PROCEDURE — C1894 INTRO/SHEATH, NON-LASER: HCPCS | Performed by: INTERNAL MEDICINE

## 2025-01-14 PROCEDURE — 85347 COAGULATION TIME ACTIVATED: CPT

## 2025-01-14 PROCEDURE — 80048 BASIC METABOLIC PNL TOTAL CA: CPT | Performed by: STUDENT IN AN ORGANIZED HEALTH CARE EDUCATION/TRAINING PROGRAM

## 2025-01-14 PROCEDURE — C9600 PERC DRUG-EL COR STENT SING: HCPCS | Performed by: INTERNAL MEDICINE

## 2025-01-14 PROCEDURE — B2111ZZ FLUOROSCOPY OF MULTIPLE CORONARY ARTERIES USING LOW OSMOLAR CONTRAST: ICD-10-PCS | Performed by: INTERNAL MEDICINE

## 2025-01-14 PROCEDURE — 92929 PR PRQ TRLUML CORONARY BM STENT W/ANGIO ADDL ART/BRNCH: CPT | Mod: RC | Performed by: INTERNAL MEDICINE

## 2025-01-14 PROCEDURE — B240ZZ3 ULTRASONOGRAPHY OF SINGLE CORONARY ARTERY, INTRAVASCULAR: ICD-10-PCS | Performed by: INTERNAL MEDICINE

## 2025-01-14 PROCEDURE — C1769 GUIDE WIRE: HCPCS | Performed by: INTERNAL MEDICINE

## 2025-01-14 PROCEDURE — 027035Z DILATION OF CORONARY ARTERY, ONE ARTERY WITH TWO DRUG-ELUTING INTRALUMINAL DEVICES, PERCUTANEOUS APPROACH: ICD-10-PCS | Performed by: INTERNAL MEDICINE

## 2025-01-14 PROCEDURE — 250N000013 HC RX MED GY IP 250 OP 250 PS 637: Performed by: PHYSICIAN ASSISTANT

## 2025-01-14 DEVICE — STENT COR ONYX FRONTIER 22X4MM ONYXNG40022UX: Type: IMPLANTABLE DEVICE | Status: FUNCTIONAL

## 2025-01-14 DEVICE — STENT COR ONYX FRONTIER 38X2.75MM ONYXNG27538UX: Type: IMPLANTABLE DEVICE | Status: FUNCTIONAL

## 2025-01-14 RX ORDER — ASPIRIN 81 MG/1
243 TABLET, CHEWABLE ORAL ONCE
Status: COMPLETED | OUTPATIENT
Start: 2025-01-14 | End: 2025-01-14

## 2025-01-14 RX ORDER — NALOXONE HYDROCHLORIDE 0.4 MG/ML
0.4 INJECTION, SOLUTION INTRAMUSCULAR; INTRAVENOUS; SUBCUTANEOUS
Status: ACTIVE | OUTPATIENT
Start: 2025-01-14 | End: 2025-01-14

## 2025-01-14 RX ORDER — ASPIRIN 81 MG/1
81 TABLET ORAL DAILY
Qty: 30 TABLET | Refills: 3 | Status: SHIPPED | OUTPATIENT
Start: 2025-01-15 | End: 2025-01-15

## 2025-01-14 RX ORDER — ATROPINE SULFATE 0.1 MG/ML
0.5 INJECTION INTRAVENOUS
Status: ACTIVE | OUTPATIENT
Start: 2025-01-14 | End: 2025-01-14

## 2025-01-14 RX ORDER — PRASUGREL 10 MG/1
10 TABLET, FILM COATED ORAL DAILY
Status: DISCONTINUED | OUTPATIENT
Start: 2025-01-15 | End: 2025-01-15 | Stop reason: HOSPADM

## 2025-01-14 RX ORDER — NALOXONE HYDROCHLORIDE 0.4 MG/ML
0.2 INJECTION, SOLUTION INTRAMUSCULAR; INTRAVENOUS; SUBCUTANEOUS
Status: ACTIVE | OUTPATIENT
Start: 2025-01-14 | End: 2025-01-14

## 2025-01-14 RX ORDER — IOPAMIDOL 755 MG/ML
INJECTION, SOLUTION INTRAVASCULAR
Status: DISCONTINUED | OUTPATIENT
Start: 2025-01-14 | End: 2025-01-14 | Stop reason: HOSPADM

## 2025-01-14 RX ORDER — PRASUGREL 10 MG/1
10 TABLET, FILM COATED ORAL DAILY
Qty: 90 TABLET | Refills: 3 | Status: SHIPPED | OUTPATIENT
Start: 2025-01-15

## 2025-01-14 RX ORDER — FENTANYL CITRATE 50 UG/ML
25 INJECTION, SOLUTION INTRAMUSCULAR; INTRAVENOUS
Status: DISCONTINUED | OUTPATIENT
Start: 2025-01-14 | End: 2025-01-15 | Stop reason: HOSPADM

## 2025-01-14 RX ORDER — ASPIRIN 81 MG/1
81 TABLET ORAL DAILY
Status: DISCONTINUED | OUTPATIENT
Start: 2025-01-15 | End: 2025-01-15 | Stop reason: HOSPADM

## 2025-01-14 RX ORDER — NITROGLYCERIN 5 MG/ML
VIAL (ML) INTRAVENOUS
Status: DISCONTINUED | OUTPATIENT
Start: 2025-01-14 | End: 2025-01-14 | Stop reason: HOSPADM

## 2025-01-14 RX ORDER — HYDRALAZINE HYDROCHLORIDE 20 MG/ML
10 INJECTION INTRAMUSCULAR; INTRAVENOUS EVERY 4 HOURS PRN
Status: DISCONTINUED | OUTPATIENT
Start: 2025-01-14 | End: 2025-01-15 | Stop reason: HOSPADM

## 2025-01-14 RX ORDER — FENTANYL CITRATE 50 UG/ML
INJECTION, SOLUTION INTRAMUSCULAR; INTRAVENOUS
Status: DISCONTINUED | OUTPATIENT
Start: 2025-01-14 | End: 2025-01-14 | Stop reason: HOSPADM

## 2025-01-14 RX ORDER — POTASSIUM CHLORIDE 1500 MG/1
20 TABLET, EXTENDED RELEASE ORAL
Status: DISCONTINUED | OUTPATIENT
Start: 2025-01-14 | End: 2025-01-14 | Stop reason: HOSPADM

## 2025-01-14 RX ORDER — SODIUM CHLORIDE 9 MG/ML
INJECTION, SOLUTION INTRAVENOUS CONTINUOUS
Status: DISCONTINUED | OUTPATIENT
Start: 2025-01-14 | End: 2025-01-14 | Stop reason: HOSPADM

## 2025-01-14 RX ORDER — HEPARIN SODIUM 1000 [USP'U]/ML
INJECTION, SOLUTION INTRAVENOUS; SUBCUTANEOUS
Status: DISCONTINUED | OUTPATIENT
Start: 2025-01-14 | End: 2025-01-14 | Stop reason: HOSPADM

## 2025-01-14 RX ORDER — ACETAMINOPHEN 325 MG/1
650 TABLET ORAL EVERY 4 HOURS PRN
Status: DISCONTINUED | OUTPATIENT
Start: 2025-01-14 | End: 2025-01-15 | Stop reason: HOSPADM

## 2025-01-14 RX ORDER — LORAZEPAM 2 MG/ML
0.5 INJECTION INTRAMUSCULAR
Status: DISCONTINUED | OUTPATIENT
Start: 2025-01-14 | End: 2025-01-14 | Stop reason: HOSPADM

## 2025-01-14 RX ORDER — OXYCODONE HYDROCHLORIDE 5 MG/1
5 TABLET ORAL EVERY 4 HOURS PRN
Status: DISCONTINUED | OUTPATIENT
Start: 2025-01-14 | End: 2025-01-15 | Stop reason: HOSPADM

## 2025-01-14 RX ORDER — SODIUM CHLORIDE 9 MG/ML
INJECTION, SOLUTION INTRAVENOUS CONTINUOUS
Status: ACTIVE | OUTPATIENT
Start: 2025-01-14 | End: 2025-01-14

## 2025-01-14 RX ORDER — NITROGLYCERIN 0.4 MG/1
0.4 TABLET SUBLINGUAL EVERY 5 MIN PRN
Status: DISCONTINUED | OUTPATIENT
Start: 2025-01-14 | End: 2025-01-15 | Stop reason: HOSPADM

## 2025-01-14 RX ORDER — ASPIRIN 325 MG
325 TABLET ORAL ONCE
Status: COMPLETED | OUTPATIENT
Start: 2025-01-14 | End: 2025-01-14

## 2025-01-14 RX ORDER — OXYCODONE HYDROCHLORIDE 5 MG/1
10 TABLET ORAL EVERY 4 HOURS PRN
Status: DISCONTINUED | OUTPATIENT
Start: 2025-01-14 | End: 2025-01-15 | Stop reason: HOSPADM

## 2025-01-14 RX ORDER — ONDANSETRON 4 MG/1
4 TABLET, ORALLY DISINTEGRATING ORAL EVERY 6 HOURS PRN
Status: DISCONTINUED | OUTPATIENT
Start: 2025-01-14 | End: 2025-01-15 | Stop reason: HOSPADM

## 2025-01-14 RX ORDER — FLUMAZENIL 0.1 MG/ML
0.2 INJECTION, SOLUTION INTRAVENOUS
Status: ACTIVE | OUTPATIENT
Start: 2025-01-14 | End: 2025-01-14

## 2025-01-14 RX ORDER — VERAPAMIL HYDROCHLORIDE 2.5 MG/ML
INJECTION, SOLUTION INTRAVENOUS
Status: DISCONTINUED | OUTPATIENT
Start: 2025-01-14 | End: 2025-01-14 | Stop reason: HOSPADM

## 2025-01-14 RX ORDER — ASPIRIN 81 MG/1
81 TABLET, CHEWABLE ORAL ONCE
Status: COMPLETED | OUTPATIENT
Start: 2025-01-14 | End: 2025-01-14

## 2025-01-14 RX ORDER — LIDOCAINE 40 MG/G
CREAM TOPICAL
Status: DISCONTINUED | OUTPATIENT
Start: 2025-01-14 | End: 2025-01-14

## 2025-01-14 RX ORDER — PRASUGREL 10 MG/1
TABLET, FILM COATED ORAL
Status: DISCONTINUED | OUTPATIENT
Start: 2025-01-14 | End: 2025-01-14 | Stop reason: HOSPADM

## 2025-01-14 RX ORDER — LORAZEPAM 0.5 MG/1
0.5 TABLET ORAL
Status: DISCONTINUED | OUTPATIENT
Start: 2025-01-14 | End: 2025-01-14 | Stop reason: HOSPADM

## 2025-01-14 RX ORDER — ONDANSETRON 2 MG/ML
4 INJECTION INTRAMUSCULAR; INTRAVENOUS EVERY 6 HOURS PRN
Status: DISCONTINUED | OUTPATIENT
Start: 2025-01-14 | End: 2025-01-15 | Stop reason: HOSPADM

## 2025-01-14 RX ORDER — METOPROLOL TARTRATE 1 MG/ML
5 INJECTION, SOLUTION INTRAVENOUS
Status: DISCONTINUED | OUTPATIENT
Start: 2025-01-14 | End: 2025-01-15 | Stop reason: HOSPADM

## 2025-01-14 RX ADMIN — METOPROLOL TARTRATE 12.5 MG: 25 TABLET, FILM COATED ORAL at 08:51

## 2025-01-14 RX ADMIN — METOPROLOL TARTRATE 12.5 MG: 25 TABLET, FILM COATED ORAL at 21:28

## 2025-01-14 RX ADMIN — LEVOTHYROXINE SODIUM 137 MCG: 112 TABLET ORAL at 06:36

## 2025-01-14 RX ADMIN — SODIUM BICARBONATE 325 MG: 325 TABLET ORAL at 08:51

## 2025-01-14 RX ADMIN — AMLODIPINE BESYLATE 5 MG: 5 TABLET ORAL at 13:21

## 2025-01-14 RX ADMIN — MIRTAZAPINE 45 MG: 15 TABLET, FILM COATED ORAL at 21:28

## 2025-01-14 RX ADMIN — ASPIRIN 81 MG CHEWABLE TABLET 243 MG: 81 TABLET CHEWABLE at 09:07

## 2025-01-14 RX ADMIN — ISOSORBIDE MONONITRATE 30 MG: 30 TABLET, EXTENDED RELEASE ORAL at 08:51

## 2025-01-14 RX ADMIN — SODIUM BICARBONATE 325 MG: 325 TABLET ORAL at 21:28

## 2025-01-14 RX ADMIN — VILAZODONE HYDROCHLORIDE 20 MG: 20 TABLET ORAL at 21:28

## 2025-01-14 RX ADMIN — SODIUM CHLORIDE: 9 INJECTION, SOLUTION INTRAVENOUS at 17:07

## 2025-01-14 RX ADMIN — ASPIRIN 81 MG: 81 TABLET, COATED ORAL at 08:58

## 2025-01-14 RX ADMIN — ASPIRIN 81 MG CHEWABLE TABLET 81 MG: 81 TABLET CHEWABLE at 17:08

## 2025-01-14 RX ADMIN — SODIUM CHLORIDE: 9 INJECTION, SOLUTION INTRAVENOUS at 12:32

## 2025-01-14 RX ADMIN — LATANOPROST 1 DROP: 50 SOLUTION OPHTHALMIC at 21:33

## 2025-01-14 RX ADMIN — INSULIN ASPART 1 UNITS: 100 INJECTION, SOLUTION INTRAVENOUS; SUBCUTANEOUS at 12:31

## 2025-01-14 RX ADMIN — FINASTERIDE 5 MG: 5 TABLET, FILM COATED ORAL at 08:51

## 2025-01-14 RX ADMIN — ROSUVASTATIN CALCIUM 40 MG: 20 TABLET, FILM COATED ORAL at 08:51

## 2025-01-14 RX ADMIN — INSULIN ASPART 1 UNITS: 100 INJECTION, SOLUTION INTRAVENOUS; SUBCUTANEOUS at 08:56

## 2025-01-14 ASSESSMENT — ACTIVITIES OF DAILY LIVING (ADL)
ADLS_ACUITY_SCORE: 51
ADLS_ACUITY_SCORE: 65
ADLS_ACUITY_SCORE: 65
ADLS_ACUITY_SCORE: 51
ADLS_ACUITY_SCORE: 65
ADLS_ACUITY_SCORE: 65
ADLS_ACUITY_SCORE: 51
ADLS_ACUITY_SCORE: 65
ADLS_ACUITY_SCORE: 51
ADLS_ACUITY_SCORE: 51
ADLS_ACUITY_SCORE: 65
ADLS_ACUITY_SCORE: 65

## 2025-01-14 NOTE — PROGRESS NOTES
Patient A/O x 4, Vss, a-febrile, denies chest pain, up to bathroom with SBA, admitted with NSTEMI found to have 3-Vessel CAD on angio, CV consulted for possible CABG, patient to have PCI instead, NPO after midnight for angio tomorrow, Heparin on hold due to drop in Hgb, Tele SR.

## 2025-01-14 NOTE — PLAN OF CARE
Goal Outcome Evaluation:      Plan of Care Reviewed With: patient    Overall Patient Progress: no changeOverall Patient Progress: no change    Neuro: A&Ox4  Tele/Cardiac: SR  Vitals:VSS on RA  Activity: SBA w/ walker  Pain: Denies pain   Drips/IV: PIV:S/L  GI/: Continent  Skin: R radial angio CDI  Diet: NPO   Test/Procedures: Am labs   Plan: Possible angio  pending  HGB

## 2025-01-14 NOTE — PROGRESS NOTES
[facility]  Cardiology Progress Note    Date of Service (when I saw the patient): 01/14/2025  Primary Cardiologist: No prior cardiology care     Interval History:   Mild chest discomfort while walking back to his bed last night.  Otherwise denies any new symptoms. Remains NPO.    ----------------------------------------------------------------------------------------    Assessment:  Steve Morgan is a 64 year old male who was transferred from [facility] on 1/10/2025 for discussion of CABG versus high risk PCI.  He was admitted initially after abnormal dobutamine stress echocardiogram.    # NSTEMI  -Recent abnormal dobutamine stress echocardiogram as well as coronary angiogram [facility] demonstrating small caliber multivessel disease likely secondary to poor controlled diabetes mellitus-there appears to be severe OM branch disease as well as PDA  -- TTE dated 12/20/2024 shows preserved LVEF but possible mobile echodensity on aortic valve which is most consistent with nodular calcification   -- Felt to be not good candidate for CABG and was arranged for PCI but due to elevated  creatinine and possible worsening anemia this was deferred  - Hemoglobin is stable today and his renal function is better; he remains NPO.      # Hyperlipidemia  - Statin regimen includes simvastatin 20 mg daily with baseline LDL of 130    #Hypertension  - Suboptimal this morning but this is in the setting of us holding his PTA losartan 100 mg    # Longstanding history of T2DM with nephropathy  - hemoglobin A1c 7.2% in the setting of hemoglobin around 8    # Chronic anemia  - Stable between 8 and 9  - No reports of active bleeding    # CKD  - Stable better today with creatinine 1.9 with Cr 1.8    # Morbid obesity     # DAMIÁN    ----------------------------------------------------------------------------------------    Plan:  - plan for PCI today (consent obtained and risk and benefits  discussed; remains n.p.o.; patient is full code)  - Continue with rosuvastatin 40 mg daily  - Continue with beta-blocker and aspirin therapy  -- Will resume losartan prior to discharge, in the interim we will order IV hydralazine to be given as needed for systolic blood pressure greater than 160    Addendum: S/p PCI to mid and distal RCA with good results. We will arrange outpatient follow up. Patient is ok for discharge tomorrow (1/15). He will be referred to outpatient cardiac rehab.       ----------------------------------------------------------------------------------------  Physical Exam   Temp: 98.1  F (36.7  C) Temp src: Oral BP: (!) 153/78 Pulse: 62   Resp: 18 SpO2: 97 % O2 Device: None (Room air)    Vitals:    01/12/25 0330 01/13/25 0427 01/14/25 0153   Weight: 104.6 kg (230 lb 11.2 oz) 105.5 kg (232 lb 8 oz) 105.3 kg (232 lb 1.6 oz)     GEN:  NAD  HEENT: Mucous membranes moist.  NECK:  No JVD.  C/V:  Regular rate and rhythm, no murmur, rub or gallop.   RESP: CTA, lamine  GI: Abdomen soft, nontender, nondistended.    EXTREM: No pitting LE edema.   NEURO: Alert and oriented, cooperative.   PSYCH: Normal affect.  SKIN: Warm and dry.   VASC: 2+ radial and dorsalis pedis pulses bilaterally.      Medications   Current Facility-Administered Medications   Medication Dose Route Frequency Provider Last Rate Last Admin    Patient is already receiving anticoagulation with heparin, enoxaparin (LOVENOX), warfarin (COUMADIN)  or other anticoagulant medication   Does not apply Continuous PRN Lorenzo Don,         sodium chloride 0.9 % infusion   Intravenous Continuous Hank Limon PA-C         Current Facility-Administered Medications   Medication Dose Route Frequency Provider Last Rate Last Admin    amLODIPine (NORVASC) tablet 5 mg  5 mg Oral Daily Lorenzo Don DO   5 mg at 01/13/25 1807    aspirin EC tablet 81 mg  81 mg Oral Daily Angel De Leon MD   81 mg at 01/14/25 0858    finasteride (PROSCAR)  tablet 5 mg  5 mg Oral Daily Lorenzo Don DO   5 mg at 01/14/25 0851    insulin aspart (NovoLOG) injection (RAPID ACTING)  1-7 Units Subcutaneous TID  Lorenzo Don DO   1 Units at 01/14/25 0856    insulin aspart (NovoLOG) injection (RAPID ACTING)  1-5 Units Subcutaneous At Bedtime Lorenzo Don DO   2 Units at 01/13/25 2140    insulin glargine (LANTUS PEN) injection 15 Units  15 Units Subcutaneous At Bedtime Brett Isidro MD   15 Units at 01/13/25 2141    isosorbide mononitrate (IMDUR) 24 hr tablet 30 mg  30 mg Oral Daily Angel De Leon MD   30 mg at 01/14/25 0851    latanoprost (XALATAN) 0.005 % ophthalmic solution 1 drop  1 drop Both Eyes At Bedtime Lorenzo Don DO   1 drop at 01/13/25 2139    levothyroxine (SYNTHROID/LEVOTHROID) tablet 137 mcg  137 mcg Oral QAM  Lorenzo Don DO   137 mcg at 01/14/25 0636    [Held by provider] losartan (COZAAR) tablet 100 mg  100 mg Oral Daily Lorenzo Don DO   100 mg at 01/13/25 0825    metoprolol tartrate (LOPRESSOR) half-tab 12.5 mg  12.5 mg Oral BID Angel De Leon MD   12.5 mg at 01/14/25 0851    mirtazapine (REMERON) tablet 45 mg  45 mg Oral At Bedtime Lorenzo Don DO   45 mg at 01/13/25 2139    rosuvastatin (CRESTOR) tablet 40 mg  40 mg Oral Daily Hank Limon PA-C   40 mg at 01/14/25 0851    sodium bicarbonate tablet 325 mg  325 mg Oral BID Lorenzo Don DO   325 mg at 01/14/25 0851    sodium chloride (PF) 0.9% PF flush 3 mL  3 mL Intracatheter Q8H Hank Limon PA-C   3 mL at 01/14/25 0853    sodium chloride (PF) 0.9% PF flush 3 mL  3 mL Intracatheter Q8H Lorenzo Don DO   3 mL at 01/14/25 0636    vilazodone (VIIBRYD) tablet 20 mg  20 mg Oral Daily Lorenzo Don DO   20 mg at 01/13/25 2029       Data   reviewed    Hank Limon PA-C   1/14/2025  Pager: Franck MCDONALD spent 20 minutes face-to-face or coordinating care of Steve Morgan. Over 50% of our time on the unit was spent counseling the patient  and/or coordinating care regarding aforementioned conditions.

## 2025-01-14 NOTE — PROGRESS NOTES
[facility]    Medicine Progress Note - Hospitalist Service    Date of Admission:  1/10/2025    Assessment & Plan   Steve Morgan is a 64 year old male with a history of Htn, Hld, DM2, DAMIÁN, hypothyroidism who is admitted on 1/10/2025 with NSTEMI and multivessel disease requiring CV surgery consult.      NSTEMI  Severe 3V CAD  Hypertension  Dyslipidemia  Patient had abnormal dobutamine stress test on 1/9 and angiogram on 1/10 showing diffuse 3 vessel disease for which CV surgery consult was recommended. He has had progressive symptoms recently including worsening exertional chest pain and occasionally some pain at rest though he seems comfortable on admission. Given his need for hospitalization, progressive symptoms and severe 3V disease, heparin was started.. Baseline echo for dobutamine stress shows normal EF with borderline hypokinesis of the septum/apex  - heparin drip discontinued 1/13  -ASA 81 mg daily  -continue amlodipine.    -Added Imdur 30 mg daily and metoprolol 12.5 mg twice daily by cardiology. Zocor changed to rosuvastatin 1/13  .  On as needed IV hydralazine  -Appreciate CV surgery evaluation.  No plan for surgery.    -Complex angiogram and stenting later today     DM2 neuropathy and nephropathy  Hgb A1C of 7.2      -Decreased Lantus 15 units at bedtime(PTA 35 units)  -Sliding scale insulin aspart with carb controlled diet  -hold metformin and glipizide for now  - gabapentin 300 mg 3 times daily as needed     Recent Labs   Lab 01/14/25  0822 01/14/25  0610 01/14/25  0141 01/13/25  2108 01/13/25  1712 01/13/25  1200   * 164* 208* 282* 168* 175*       CKD stage IIIb  Chronic hyponatremia  Baseline creatinine appears to be around 1.7  -monitor.  Ranging between 1.7-1.9.  -Avoid nephrotoxic medications.  --Continue sodium bicarb 325 mg twice daily     Hypothyroidism  -continue PTA levothyroxine 137 mcg daily     BPH  -continue PTA proscar     Chronic anemia  Baseline hgb  "around 10  -monitor while on heparin     Depression  -on vilazodone         Diet: NPO for Medical/Clinical Reasons Except for: Meds  NPO for Medical/Clinical Reasons Except for: Meds, Ice Chips    DVT Prophylaxis: heparin  [place] Catheter: Not present  Lines: None     Cardiac Monitoring: ACTIVE order. Indication: AMI (NSTEMI/ STEMI) (48 hours)  Code Status: Full Code      Clinically Significant Risk Factors         # Hyponatremia: Lowest Na = 124 mmol/L in last 2 days, will monitor as appropriate  # Hypochloremia: Lowest Cl = 92 mmol/L in last 2 days, will monitor as appropriate          # Hypertension: Noted on problem list             # DMII: A1C = 7.2 % (Ref range: 0.0 - 5.6 %) within past 6 months, PRESENT ON ADMISSION  # Obesity: Estimated body mass index is 38.62 kg/m  as calculated from the following:    Height as of 1/9/25: 1.651 m (5' 5\").    Weight as of this encounter: 105.3 kg (232 lb 1.6 oz)., PRESENT ON ADMISSION     # Financial/Environmental Concerns: none         Social Drivers of Health    Depression: Not at risk (1/6/2025)    PHQ-2     PHQ-2 Score: 2   Recent Concern: Depression - At risk (11/21/2024)    PHQ-2     PHQ-2 Score: 4   Physical Activity: Inactive (7/5/2024)    Exercise Vital Sign     Days of Exercise per Week: 0 days     Minutes of Exercise per Session: 0 min          Disposition Plan     Medically Ready for Discharge: Anticipated in 2-4 Days         Brett Isidro MD  Hospitalist Service  [facility]  Securely message with [employer] (more info)  Text page via [employer] Paging/Directory   \"This dictation was performed with voice recognition software and may contain errors,  omissions and inadvertent word substitution.\"    ______________________________________________________________________    Interval History   Last 24-hour events noted.   Doing okay.  Denies any chest pain/palpitations or shortness of breath     Physical Exam   /64 (BP Location: Right arm)   " Pulse 56   Temp 97.9  F (36.6  C) (Oral)   Resp 18   Wt 105.3 kg (232 lb 1.6 oz)   SpO2 94%   BMI 38.62 kg/m    Gen- pleasant   Neck- supple  CVS- I+II+ no m/r/g  RS- CTABS  Abdo- soft, no tenderness . No g/r/r  Ext-trace edema       Medical Decision Making       51 MINUTES SPENT BY ME on the date of service doing chart review, history, exam, documentation & further activities per the note.  Review of cardiology note    Data   ------------------------- PAST 24 HR DATA REVIEWED -----------------------------------------------    I have personally reviewed the following data over the past 24 hrs:    6.5  \   9.5 (L)   / 272     128 (L) 96 (L) 29.3 (H) /  173 (H)   4.9 22 1.91 (H) \       Imaging results reviewed over the past 24 hrs:   No results found for this or any previous visit (from the past 24 hours).

## 2025-01-14 NOTE — PROGRESS NOTES
VSS on RA, wears home CPAP at night. A&Ox4. NPO for angio. BG ACHS. Tele: SB (55). SBA with walker. X2 PIV. Old R rad site from angio on 1/10. Pulses +2. +1 edema BLE. Baseline tremors, numbness, and tingling from neuropathy.

## 2025-01-14 NOTE — PLAN OF CARE
January 14, 2025  Shift:[id number]  Steve Morgan  64 year old  YOB: 1960    Reason for admission:Multivessel coronary disease  CAD (coronary artery disease)    Cognition/Mentation:A&Ox 4  Neuros/CMS:BLE edema  VS:VSS on RA  Cardiac:bradycardic  GI:WDL  :WDL  Pulmonary:SOB on exertion  Pain:denies pain  Drains/Lines:PIV infusing NS @ 75 mL/hour until 2000  Skin:WDL ex for R angio site   Activity:A1 GB/W  Diet:Low fat/Na diet  Discharge:Pending    Shift summary:Angio today in R wrist

## 2025-01-15 VITALS
OXYGEN SATURATION: 96 % | HEART RATE: 60 BPM | BODY MASS INDEX: 38.42 KG/M2 | RESPIRATION RATE: 17 BRPM | SYSTOLIC BLOOD PRESSURE: 148 MMHG | WEIGHT: 230.9 LBS | DIASTOLIC BLOOD PRESSURE: 79 MMHG | TEMPERATURE: 98.3 F

## 2025-01-15 LAB
ANION GAP SERPL CALCULATED.3IONS-SCNC: 10 MMOL/L (ref 7–15)
ATRIAL RATE - MUSE: 57 BPM
ATRIAL RATE - MUSE: 61 BPM
BACTERIA SPEC CULT: NORMAL
BUN SERPL-MCNC: 28.7 MG/DL (ref 8–23)
CALCIUM SERPL-MCNC: 9.3 MG/DL (ref 8.8–10.4)
CHLORIDE SERPL-SCNC: 95 MMOL/L (ref 98–107)
CHOLEST SERPL-MCNC: 116 MG/DL
CREAT SERPL-MCNC: 1.96 MG/DL (ref 0.67–1.17)
DIASTOLIC BLOOD PRESSURE - MUSE: NORMAL MMHG
DIASTOLIC BLOOD PRESSURE - MUSE: NORMAL MMHG
EGFRCR SERPLBLD CKD-EPI 2021: 37 ML/MIN/1.73M2
GLUCOSE BLDC GLUCOMTR-MCNC: 153 MG/DL (ref 70–99)
GLUCOSE BLDC GLUCOMTR-MCNC: 158 MG/DL (ref 70–99)
GLUCOSE BLDC GLUCOMTR-MCNC: 219 MG/DL (ref 70–99)
GLUCOSE BLDC GLUCOMTR-MCNC: 257 MG/DL (ref 70–99)
GLUCOSE SERPL-MCNC: 138 MG/DL (ref 70–99)
HCO3 SERPL-SCNC: 23 MMOL/L (ref 22–29)
HDLC SERPL-MCNC: 29 MG/DL
HGB BLD-MCNC: 9.1 G/DL (ref 13.3–17.7)
INTERPRETATION ECG - MUSE: NORMAL
INTERPRETATION ECG - MUSE: NORMAL
LDLC SERPL CALC-MCNC: 51 MG/DL
NONHDLC SERPL-MCNC: 87 MG/DL
P AXIS - MUSE: 53 DEGREES
P AXIS - MUSE: 78 DEGREES
POTASSIUM SERPL-SCNC: 4.9 MMOL/L (ref 3.4–5.3)
PR INTERVAL - MUSE: 182 MS
PR INTERVAL - MUSE: 184 MS
QRS DURATION - MUSE: 82 MS
QRS DURATION - MUSE: 86 MS
QT - MUSE: 408 MS
QT - MUSE: 414 MS
QTC - MUSE: 402 MS
QTC - MUSE: 410 MS
R AXIS - MUSE: -32 DEGREES
R AXIS - MUSE: 22 DEGREES
SODIUM SERPL-SCNC: 128 MMOL/L (ref 135–145)
SYSTOLIC BLOOD PRESSURE - MUSE: NORMAL MMHG
SYSTOLIC BLOOD PRESSURE - MUSE: NORMAL MMHG
T AXIS - MUSE: -20 DEGREES
T AXIS - MUSE: -74 DEGREES
TRIGL SERPL-MCNC: 179 MG/DL
TROPONIN T SERPL HS-MCNC: 62 NG/L
TROPONIN T SERPL HS-MCNC: 70 NG/L
VENTRICULAR RATE- MUSE: 57 BPM
VENTRICULAR RATE- MUSE: 61 BPM

## 2025-01-15 PROCEDURE — 250N000013 HC RX MED GY IP 250 OP 250 PS 637: Performed by: PHYSICIAN ASSISTANT

## 2025-01-15 PROCEDURE — 82435 ASSAY OF BLOOD CHLORIDE: CPT | Performed by: INTERNAL MEDICINE

## 2025-01-15 PROCEDURE — 97162 PT EVAL MOD COMPLEX 30 MIN: CPT | Mod: GP

## 2025-01-15 PROCEDURE — 97530 THERAPEUTIC ACTIVITIES: CPT | Mod: GP

## 2025-01-15 PROCEDURE — 80048 BASIC METABOLIC PNL TOTAL CA: CPT | Performed by: INTERNAL MEDICINE

## 2025-01-15 PROCEDURE — 250N000013 HC RX MED GY IP 250 OP 250 PS 637: Performed by: INTERNAL MEDICINE

## 2025-01-15 PROCEDURE — 99207 PR NO BILLABLE SERVICE THIS VISIT: CPT | Performed by: INTERNAL MEDICINE

## 2025-01-15 PROCEDURE — 36415 COLL VENOUS BLD VENIPUNCTURE: CPT | Performed by: INTERNAL MEDICINE

## 2025-01-15 PROCEDURE — 85018 HEMOGLOBIN: CPT | Performed by: INTERNAL MEDICINE

## 2025-01-15 PROCEDURE — 99233 SBSQ HOSP IP/OBS HIGH 50: CPT | Mod: FS | Performed by: PHYSICIAN ASSISTANT

## 2025-01-15 PROCEDURE — 84484 ASSAY OF TROPONIN QUANT: CPT | Performed by: INTERNAL MEDICINE

## 2025-01-15 PROCEDURE — 93005 ELECTROCARDIOGRAM TRACING: CPT

## 2025-01-15 PROCEDURE — 82565 ASSAY OF CREATININE: CPT | Performed by: INTERNAL MEDICINE

## 2025-01-15 PROCEDURE — 97110 THERAPEUTIC EXERCISES: CPT | Mod: GP

## 2025-01-15 PROCEDURE — 93010 ELECTROCARDIOGRAM REPORT: CPT | Performed by: INTERNAL MEDICINE

## 2025-01-15 PROCEDURE — 80061 LIPID PANEL: CPT | Performed by: STUDENT IN AN ORGANIZED HEALTH CARE EDUCATION/TRAINING PROGRAM

## 2025-01-15 PROCEDURE — 250N000013 HC RX MED GY IP 250 OP 250 PS 637: Performed by: STUDENT IN AN ORGANIZED HEALTH CARE EDUCATION/TRAINING PROGRAM

## 2025-01-15 PROCEDURE — 99239 HOSP IP/OBS DSCHRG MGMT >30: CPT | Performed by: INTERNAL MEDICINE

## 2025-01-15 RX ORDER — ASPIRIN 81 MG/1
81 TABLET ORAL DAILY
Qty: 30 TABLET | Refills: 3 | Status: SHIPPED | OUTPATIENT
Start: 2025-01-15

## 2025-01-15 RX ORDER — ISOSORBIDE MONONITRATE 60 MG/1
60 TABLET, EXTENDED RELEASE ORAL DAILY
Status: DISCONTINUED | OUTPATIENT
Start: 2025-01-16 | End: 2025-01-15 | Stop reason: HOSPADM

## 2025-01-15 RX ORDER — ROSUVASTATIN CALCIUM 40 MG/1
40 TABLET, COATED ORAL DAILY
Qty: 30 TABLET | Refills: 3 | Status: SHIPPED | OUTPATIENT
Start: 2025-01-16 | End: 2025-01-23

## 2025-01-15 RX ORDER — INSULIN GLARGINE 100 [IU]/ML
17 INJECTION, SOLUTION SUBCUTANEOUS DAILY
Qty: 30 ML | Refills: 2 | Status: SHIPPED | OUTPATIENT
Start: 2025-01-15

## 2025-01-15 RX ORDER — ISOSORBIDE MONONITRATE 60 MG/1
60 TABLET, EXTENDED RELEASE ORAL DAILY
Qty: 30 TABLET | Refills: 1 | Status: SHIPPED | OUTPATIENT
Start: 2025-01-16 | End: 2025-01-23

## 2025-01-15 RX ORDER — METOPROLOL TARTRATE 25 MG/1
12.5 TABLET, FILM COATED ORAL 2 TIMES DAILY
Qty: 30 TABLET | Refills: 1 | Status: SHIPPED | OUTPATIENT
Start: 2025-01-15 | End: 2025-01-23

## 2025-01-15 RX ORDER — NITROGLYCERIN 0.4 MG/1
TABLET SUBLINGUAL
Qty: 30 TABLET | Refills: 11 | Status: SHIPPED | OUTPATIENT
Start: 2025-01-15

## 2025-01-15 RX ADMIN — PRASUGREL 10 MG: 10 TABLET, FILM COATED ORAL at 11:07

## 2025-01-15 RX ADMIN — INSULIN ASPART 3 UNITS: 100 INJECTION, SOLUTION INTRAVENOUS; SUBCUTANEOUS at 16:46

## 2025-01-15 RX ADMIN — SODIUM BICARBONATE 325 MG: 325 TABLET ORAL at 09:04

## 2025-01-15 RX ADMIN — INSULIN ASPART 1 UNITS: 100 INJECTION, SOLUTION INTRAVENOUS; SUBCUTANEOUS at 09:05

## 2025-01-15 RX ADMIN — METOPROLOL TARTRATE 12.5 MG: 25 TABLET, FILM COATED ORAL at 09:05

## 2025-01-15 RX ADMIN — AMLODIPINE BESYLATE 5 MG: 5 TABLET ORAL at 13:48

## 2025-01-15 RX ADMIN — FINASTERIDE 5 MG: 5 TABLET, FILM COATED ORAL at 09:05

## 2025-01-15 RX ADMIN — LEVOTHYROXINE SODIUM 137 MCG: 112 TABLET ORAL at 09:04

## 2025-01-15 RX ADMIN — ASPIRIN 81 MG: 81 TABLET, COATED ORAL at 09:04

## 2025-01-15 RX ADMIN — INSULIN ASPART 2 UNITS: 100 INJECTION, SOLUTION INTRAVENOUS; SUBCUTANEOUS at 12:36

## 2025-01-15 RX ADMIN — ROSUVASTATIN CALCIUM 40 MG: 20 TABLET, FILM COATED ORAL at 09:04

## 2025-01-15 RX ADMIN — ISOSORBIDE MONONITRATE 30 MG: 30 TABLET, EXTENDED RELEASE ORAL at 09:04

## 2025-01-15 ASSESSMENT — ACTIVITIES OF DAILY LIVING (ADL)
ADLS_ACUITY_SCORE: 54
ADLS_ACUITY_SCORE: 55
ADLS_ACUITY_SCORE: 51
ADLS_ACUITY_SCORE: 54
ADLS_ACUITY_SCORE: 51
ADLS_ACUITY_SCORE: 54
ADLS_ACUITY_SCORE: 51
ADLS_ACUITY_SCORE: 54
ADLS_ACUITY_SCORE: 51
ADLS_ACUITY_SCORE: 51
ADLS_ACUITY_SCORE: 55
ADLS_ACUITY_SCORE: 54
ADLS_ACUITY_SCORE: 55
ADLS_ACUITY_SCORE: 55
ADLS_ACUITY_SCORE: 54

## 2025-01-15 NOTE — PROGRESS NOTES
01/15/25 1026   Appointment Info   Signing Clinician's Name / Credentials (PT) Aurea Nieto, PT, DPT   Living Environment   People in Home spouse;child(roverto), adult   Current Living Arrangements house   Home Accessibility stairs to enter home;stairs within home   Number of Stairs, Main Entrance 2   Stair Railings, Main Entrance railings safe and in good condition   Number of Stairs, Within Home, Primary greater than 10 stairs   Stair Railings, Within Home, Primary railings safe and in good condition   Transportation Anticipated family or friend will provide   Living Environment Comments Pt lives in 4 level home, approx 6 stairs in each level, 1-2 steps to enter depending on entrance, son has recently put rails up.   Self-Care   Usual Activity Tolerance moderate   Current Activity Tolerance fair   Regular Exercise No   Equipment Currently Used at Home walker, rolling   Fall history within last six months no   Activity/Exercise/Self-Care Comment Pt is independent at baseline, spouse assists as needed, has B AFOs, does have fine motor deficits so spouse will assist as needed with these tasks.   General Information   Onset of Illness/Injury or Date of Surgery 01/10/25   Referring Physician Lorne Garvey MD   Patient/Family Therapy Goals Statement (PT) To go home   Pertinent History of Current Problem (include personal factors and/or comorbidities that impact the POC) Pt is 64 year old male adm on 1/10/25 as a transfer from [facility] with NSTEMI and multivessel disease requiring CV surgery consult. It was decided pt was too high risk for CABG, underwent complex PCI on 1/14/25, has residual disease. PMH includes HTN, dyslipidemia, DM2 neuropathy and nephropathy, CKD stage IIIb, hypothyroidism, BPH, chronic anemia   Existing Precautions/Restrictions fall;cardiac   Cognition   Affect/Mental Status (Cognition) WFL   Orientation Status (Cognition) oriented x 4   Follows Commands (Cognition) WFL   Pain Assessment    Patient Currently in Pain No   Integumentary/Edema   Integumentary/Edema no deficits were identifed   Posture    Posture Forward head position;Protracted shoulders   Range of Motion (ROM)   Range of Motion ROM deficits secondary to weakness   ROM Comment Pt with B foot drop, wears B AFOs   Strength (Manual Muscle Testing)   Strength (Manual Muscle Testing) Deficits observed during functional mobility   Strength Comments Pt with distal weakness in hands and feet, overall deconditioned as well   Bed Mobility   Bed Mobility no deficits identified   Transfers   Comment, (Transfers) SBA   Gait/Stairs (Locomotion)   Comment, (Gait/Stairs) CGA   Balance   Balance Comments Pt with no overt LOB, can be unsteady at times especially with turns or obstacles   Sensory Examination   Sensory Perception Comments Pt has baseline neuropathy   Clinical Impression   Criteria for Skilled Therapeutic Intervention Yes, treatment indicated   PT Diagnosis (PT) Impaired mobility   Influenced by the following impairments Decreased activity tolerance, decreased strength, decreased balance   Functional limitations due to impairments Decraesed ability to participate in daily tasks   Clinical Presentation (PT Evaluation Complexity) evolving   Clinical Presentation Rationale Current presentation, [facility]   Clinical Decision Making (Complexity) moderate complexity   Planned Therapy Interventions (PT) balance training;bed mobility training;gait training;home exercise program;patient/family education;stair training;strengthening;transfer training;progressive activity/exercise;risk factor education;home program guidelines   Risk & Benefits of therapy have been explained evaluation/treatment results reviewed;care plan/treatment goals reviewed;risks/benefits reviewed;current/potential barriers reviewed;participants voiced agreement with care plan;participants included;patient   PT Total Evaluation Time   PT Eval, Moderate Complexity Minutes (40006) 10    Physical Therapy Goals   PT Frequency Daily   PT Predicted Duration/Target Date for Goal Attainment 01/25/25   PT Goals Cardiac Phase 1   PT: Understanding of cardiac education to maximize quality of life, condition management, and health outcomes Patient;Verbalize;Demonstrate   PT: Perform aerobic activity with stable cardiovascular response continuous;10 minutes;ambulation;NuStep   PT: Functional/aerobic ambulation tolerance with stable cardiovascular response in order to return to home and community environment Modified independent;200 feet;Rolling walker   PT: Navigation of stairs simulating home set up with stable cardiovascular response in order to return to home and community environment Supervision/SBA;5 stairs;Rail on both sides   Interventions   Interventions Quick Adds Therapeutic Activity;Therapeutic Procedure;Cardiac Rehab   Therapeutic Procedure/Exercise   Ther. Procedure: strength, endurance, ROM, flexibillity Minutes (84682) 10   Symptoms Noted During/After Treatment fatigue;increased pain   Treatment Time Includes (CR Only) See specific exercise details intervention group(s);Monitoring of vital signs (see vital signs flowsheet for details)   Therapeutic Activity   Therapeutic Activities: dynamic activities to improve functional performance Minutes (58788) 25   Symptoms Noted During/After Treatment Fatigue;Increased pain   Treatment Detail/Skilled Intervention Pt is CGA for ambulation, SBA for transfers, time spent in education. Pt was able to don/doff B shoes and AFOs independently.   Ambulation   Workload Hallway ambulation with FWW and CGA   Duration (minutes) 6 minutes   Effort Scale 4   Symptoms Fatigue;Signs and symptoms of angina   Cardiovascular Response Hypertension at rest;Hypertensive response to exercise   Exercise Details CGA for balance with ambulation   Vital Signs Details Hypertensive, see VSFS   Cardiac Education   Education Provided Home exercise program;OMNI Scale;Outpatient  Cardiac Rehab;Precautions;Resuming home activities;Risk factors;Signs and symptoms;Stop light tool   Education Packet Given to Patient Yes   All Patient Education Handouts Reviewed with Patient and/or Family Yes   Cardiac Rehab Phase II Plan   Phase II Order Received Yes   Phase II Appointment Status Scheduled   Date/Time 1/22/25 @ 10am   Location [place]   PT Discharge Planning   PT Plan General mobility progression   PT Discharge Recommendation (DC Rec) home with assist;home with outpatient cardiac rehab   PT Rationale for DC Rec Pt is quite deconditioned but can perform mobility necessary for discharge to home, anticipate need for family support for higher level tasks and transportation, would benefit from outpatient cardiac rehab for monitored progression of activity and education to promote overall heart health.   PT Brief overview of current status Goals of therapy will be to address safe mobility and make recs for d/c to next level of care. Pt and RN will continue to follow all falls risk precautions as documented by RN staff while hospitalized   PT Total Distance Amb During Session (feet) 75   Physical Therapy Time and Intention   Timed Code Treatment Minutes 35   Total Session Time (sum of timed and untimed services) 45

## 2025-01-15 NOTE — PROVIDER NOTIFICATION
MD Notification    Notified Person: MD    Notified Person Name:     Notification Date/Time: 1/15/2025 1230    Notification Interaction: [employer] messaging    Purpose of Notification: Pt. Re-screened using C-SSRS-no risks indicated. Please clarify DEC order.    Orders Received: DEC order discontinued.    Comments:

## 2025-01-15 NOTE — PROGRESS NOTES
"[facility]    Medicine Progress Note - Hospitalist Service    Date of Admission:  1/10/2025    Assessment & Plan   Steve Morgan is a 64 year old male with a history of Htn, Hld, DM2, DAMIÁN, hypothyroidism who is admitted on 1/10/2025 with NSTEMI and multivessel disease requiring CV surgery consult.      NSTEMI  Severe 3V CAD  Hypertension  Dyslipidemia  Patient had abnormal dobutamine stress test on 1/9 and angiogram on 1/10 showing diffuse 3 vessel disease for which CV surgery consult was recommended. He has had progressive symptoms recently including worsening exertional chest pain and occasionally some pain at rest though he seems comfortable on admission. Given his need for hospitalization, progressive symptoms and severe 3V disease, heparin was started.. Baseline echo for dobutamine stress shows normal EF with borderline hypokinesis of the septum/apex  - heparin drip discontinued 1/13  -ASA 81 mg daily  -continue amlodipine.    -Added Imdur 30 mg daily and metoprolol 12.5 mg twice daily by cardiology. Zocor changed to rosuvastatin 1/13  .  On as needed IV hydralazine  -Appreciate CV surgery evaluation.  No plan for surgery.    -Complex angiogram and stenting 1/13: \"Successful HD-IVUS guided PCI from the distal RCA into the rPDA with placement of a single 2.75 x 38 mm Magnus [place] HALEY, post-dilated up to 4.0 mm within the RCA istself  Successful HD-IVUS guided PCI of the mid RCA with placement of a single 4.0 x 22 mm [place]us [place] HALEY, postdilated up to 4.5 mm.\"     -Continue dual antiplatelet therapy with aspirin and Effient for 6 months, followed by indefinite P2Y12 inhibitor monotherapy  - Optimal medical therapy for CAD, including high intensity statin therapy   1/15: Still with chest heaviness on walking.  Will get EKG and troponin and requested cardiology to reevaluate     DM2 neuropathy and nephropathy  Hgb A1C of 7.2      -Decreased Lantus 15 units at bedtime(PTA 35 " "units)  -Sliding scale insulin aspart with carb controlled diet  -hold metformin and glipizide for now  - gabapentin 300 mg 3 times daily as needed     Recent Labs   Lab 01/15/25  0806 01/15/25  0427 01/15/25  0215 01/14/25  2114 01/14/25  1633 01/14/25  1231   * 138* 158* 207* 104* 157*       CKD stage IIIb  Chronic hyponatremia  Baseline creatinine appears to be around 1.7  -monitor.  Ranging between 1.7-1.9.  -Avoid nephrotoxic medications.  --Continue sodium bicarb 325 mg twice daily     Hypothyroidism  -continue PTA levothyroxine 137 mcg daily     BPH  -continue PTA proscar     Chronic anemia  Baseline hgb around 10  -monitor while on heparin     Depression  -on vilazodone         Diet: Low Saturated Fat Na <2400 mg    DVT Prophylaxis: heparin  [place] Catheter: Not present  Lines: None     Cardiac Monitoring: ACTIVE order. Indication: Post- PCI/Angiogram (24 hours)  Code Status: Full Code      Clinically Significant Risk Factors         # Hyponatremia: Lowest Na = 128 mmol/L in last 2 days, will monitor as appropriate  # Hypochloremia: Lowest Cl = 95 mmol/L in last 2 days, will monitor as appropriate          # Hypertension: Noted on problem list             # DMII: A1C = 7.2 % (Ref range: 0.0 - 5.6 %) within past 6 months   # Obesity: Estimated body mass index is 38.42 kg/m  as calculated from the following:    Height as of 1/9/25: 1.651 m (5' 5\").    Weight as of this encounter: 104.7 kg (230 lb 14.4 oz).      # Financial/Environmental Concerns: none         Social Drivers of Health    Depression: Not at risk (1/6/2025)    PHQ-2     PHQ-2 Score: 2   Recent Concern: Depression - At risk (11/21/2024)    PHQ-2     PHQ-2 Score: 4   Physical Activity: Inactive (7/5/2024)    Exercise Vital Sign     Days of Exercise per Week: 0 days     Minutes of Exercise per Session: 0 min          Disposition Plan     Medically Ready for Discharge: Anticipated in 2-4 Days         Brett Isidro MD  Hospitalist Service  M " "[facility]  Securely message with SMS Assist (more info)  Text page via [employer] Paging/Directory   \"This dictation was performed with voice recognition software and may contain errors,  omissions and inadvertent word substitution.\"    ______________________________________________________________________    Interval History   Last 24-hour events noted.   Had angiogram and stenting yesterday.  This morning was walking with therapy when again started having chest heaviness.  Relieved after sitting  Denies any nausea/vomiting/diaphoresis    Physical Exam   /64 (BP Location: Right arm)   Pulse 60   Temp 98.1  F (36.7  C) (Axillary)   Resp 18   Wt 104.7 kg (230 lb 14.4 oz)   SpO2 97%   BMI 38.42 kg/m    Gen- pleasant   Neck- supple  CVS- I+II+ no m/r/g  RS- CTABS  Abdo- soft, no tenderness . No g/r/r  Ext-trace edema       Medical Decision Making       51 MINUTES SPENT BY ME on the date of service doing chart review, history, exam, documentation & further activities per the note.  Discussion with patient, bedside RN and cardiology service    Data   ------------------------- PAST 24 HR DATA REVIEWED -----------------------------------------------    I have personally reviewed the following data over the past 24 hrs:    N/A  \   9.1 (L)   / N/A     128 (L) 95 (L) 28.7 (H) /  153 (H)   4.9 23 1.96 (H) \       Imaging results reviewed over the past 24 hrs:   Recent Results (from the past 24 hours)   Cardiac Catheterization    Narrative    Successful HD-IVUS guided PCI from the distal RCA into the rPDA with   placement of a single 2.75 x 38 mm Magnus [place] HALEY, post-dilated up to   4.0 mm within the RCA istself  Successful HD-IVUS guided PCI of the mid RCA with placement of a single   4.0 x 22 mm [place] HALEY, postdilated up to 4.5 mm.      Recommend medical management of remaining CAD at this time.  If residual   angina unresponsive to medical therapy, could consider PCI of the LCx/OM   at " that time.

## 2025-01-15 NOTE — DISCHARGE INSTRUCTIONS
Going Home after Coronary Stent Placement       Name: Steve Morgan  Medical Record Number:  [id number]  Today's Date: January 15, 2025        For 24 hours:        Have an adult stay with you for 24 hours.        Relax and take it easy.        Drink plenty of fluids.        You may eat your normal diet, unless your doctor tells you otherwise.        Do NOT make any important or legal decisions.        Do NOT drive or operate machines at home or at work.        Do NOT drink alcohol.      Do NOT smoke.     Medicines:        If you have begun Plavix (clopidogrel), Effient (prasugrel), or Brilinta (ticagrelor), do not stop taking it until you talk to your heart doctor (cardiologist).        If you are on metformin (Glucophage), do not restart it until you have blood tests (within 2 to 3 days after discharge). When your doctor tells you it is safe, you may restart the metformin.        If you have stopped any other medicines, check with your nurse or provider about when to restart them.    Care of wrist or arm site:        It is normal to have soreness at the puncture site and mild tingling in your hand for up to 3 days.          Remove the Band-Aid after 24 hours. If there is minor oozing, apply another Band-aid and remove it after 12 hours.         Do NOT take a bath, or use a hot tub or pool for the next 48 hours. You may shower.         It is normal to have a small bruise.  There should not be a lump at the site.        Do not scrub the site.        Do not use lotion or powder near the puncture site for 3 days.        For 2 days, do not use your hand or arm to support your weight (such as rising from a chair) or bend your wrist (such as lifting a garage door).        For 2 days, do not lift more than 5 pounds or exercise your arm (tennis, golf or bowling).    If you start bleeding from the site in your arm: Sit down and press firmly on the site with your fingers for 10 minutes. Call your doctor as soon as you  can.    Call 911 right away if you have bleeding that is heavy or does not stop.     Call your doctor if:        You have a large or growing hard lump around the site.        The site is red, swollen, hot or tender.        Blood or fluid is draining from the site.        You have chills or a fever greater than 101 F (38 C).        Your leg or arm turns bluish, feels numb or cool.        You have hives, a rash or unusual itching.     Cardiac Rehabilitation: You should receive a phone call from the Cardiac Rehabilitation Department within the next week.  If you do not receive the call, please contact [facility] at [phone].    ADDITIONAL INSTRUCTIONS: ***    [facility] :   [phone] (7 days a week)      Cardiology Fellow on call (24 hours per day) at [facility]:   [phone] (ask for Cardiology Fellow on call)      Number where we can reach you:  ____________

## 2025-01-15 NOTE — PLAN OF CARE
Goal Outcome Evaluation:  1/14/2025 [id number]  Here for  NSTEMI and multivessel disease requiring CV surgery consult.   S/p PCI to mid and distal RCA, PCI 1/14/25  Orientations: AOx4  Vitals/Pain: VSS RA - Denies pain  Tele: SR/SB  Lines/Drains: PIV SL  Skin/Wounds: Intact except right radial site from Angio yesterday, arm board in place. Band-Aid on. CMS intact  GI/: Continent, up to the BR, BS+, passing gas.  Labs: Abnormal/Trends, Electrolyte Replacement- See results  Ambulation/Assist: SBA  Plan: Home C-pap on overnight.  Denies any chest pain/palpitations or shortness of breath. Possible discharge tomorrow (1/15)

## 2025-01-15 NOTE — DISCHARGE SUMMARY
"[facility]  Hospitalist Discharge Summary      Date of Admission:  1/10/2025  Date of Discharge:  1/15/2025  Discharging Provider: Brett Isidro MD  Discharge Service: Hospitalist Service    Discharge Diagnoses     NSTEMI  Severe 3V CAD  Hypertension  Dyslipidemia  DM2 neuropathy and nephropathy  Hgb A1C of 7.2      CKD stage IIIb  Chronic hyponatremia   Hypothyroidism    BPH  Chronic anemia  Depression    Clinically Significant Risk Factors     # DMII: A1C = 7.2 % (Ref range: 0.0 - 5.6 %) within past 6 months  # Obesity: Estimated body mass index is 38.42 kg/m  as calculated from the following:    Height as of 1/9/25: 1.651 m (5' 5\").    Weight as of this encounter: 104.7 kg (230 lb 14.4 oz).       Follow-ups Needed After Discharge   Follow-up Appointments       Follow Up      Follow up cardiology within 1 week        Hospital to Primary Care - Establish PCP Referral      Please be aware that coverage of these services is subject to the terms and limitations of your health insurance plan.  Call member services at your health plan with any benefit or coverage questions.    Schedule Primary Care visit within: 7 Days             Unresulted Labs Ordered in the Past 30 Days of this Admission       Date and Time Order Name Status Description    1/13/2025  9:12 AM MRSA Culture Preliminary           Discharge Disposition   Discharged to home  Condition at discharge: Stable    Hospital Course   Steve Morgan is a 64 year old male with a history of Htn, Hld, DM2, DAMIÁN, hypothyroidism who is admitted on 1/10/2025 with NSTEMI and multivessel disease requiring CV surgery consult.      NSTEMI  Severe 3V CAD  Hypertension  Dyslipidemia  Patient had abnormal dobutamine stress test on 1/9 and angiogram on 1/10 showing diffuse 3 vessel disease for which CV surgery consult was recommended. He has had progressive symptoms recently including worsening exertional chest pain and occasionally some pain at rest " "though he seems comfortable on admission. Given his need for hospitalization, progressive symptoms and severe 3V disease, heparin was started.. Baseline echo for dobutamine stress shows normal EF with borderline hypokinesis of the septum/apex  - heparin drip discontinued 1/13  -ASA 81 mg daily  -continue amlodipine.Added Imdur 30 mg daily and metoprolol 12.5 mg twice daily by cardiology. Zocor changed to rosuvastatin 1/13  -Appreciate CV surgery evaluation.  No plan for surgery.    -Complex angiogram and stenting 1/13: \"Successful HD-IVUS guided PCI from the distal RCA into the rPDA with placement of a single 2.75 x 38 mm Mode [place] HALEY, post-dilated up to 4.0 mm within the RCA istself  Successful HD-IVUS guided PCI of the mid RCA with placement of a single 4.0 x 22 mm Magnus [place] HALEY, postdilated up to 4.5 mm.\"      -Continue dual antiplatelet therapy with aspirin and Effient for 6 months, followed by indefinite P2Y12 inhibitor monotherapy  - Optimal medical therapy for CAD, including high intensity statin therapy   1/15:Had atypical chest pain episode today. Has residual CAD in diffsely diseased LAD and and small LCx. He had no angina at cardiac rehab, but we discussed that some chronic angina is likely going forward.    His Imdur was increased and he was cleared for discharge home by cardiology.  Losartan has been held and will need to be followed up in clinic    DM2 neuropathy and nephropathy  Hgb A1C of 7.2      -Decreased Lantus 15 units at bedtime(PTA 35 units) in hospital and 17 units at bedtime on discharge  -Sliding scale insulin aspart with carb controlled diet  -Discontinued metformin and glipizide due to CKD  - gabapentin 300 mg 3 times daily as needed     CKD stage IIIb  Chronic hyponatremia  Baseline creatinine appears to be around 1.7  -monitor.  Ranging between 1.7-1.9.  -Avoid nephrotoxic medications.  --Continued sodium bicarb 325 mg twice daily     Hypothyroidism  -continued PTA " levothyroxine 137 mcg daily     BPH  -continued PTA proscar     Chronic anemia  Baseline hgb around 10  -monitor while on heparin     Depression  -on vilazodone     Consultations This Hospital Stay   CARDIOVASCULAR SURGERY IP CONSULT  CARDIOLOGY IP CONSULT  PHARMACY IP CONSULT  CARE MANAGEMENT / SOCIAL WORK IP CONSULT  HOSPITALIST IP CONSULT  NUTRITION SERVICES ADULT IP CONSULT  CARDIAC REHAB IP CONSULT  DIAGNOSTIC EVALUATION CENTER (DEC) ASSESSMENT ORDER  SMOKING CESSATION PROGRAM IP CONSULT    Code Status   Full Code    Time Spent on this Encounter   I, Brett Isidro MD, personally saw the patient today and spent greater than 30 minutes discharging this patient.       Brett Isidro MD  [facility]  [address]  [place] [address]  Phone: [phone]  ______________________________________________________________________    Physical Exam   Vital Signs: Temp: 98.1  F (36.7  C) Temp src: Axillary BP: 127/64 Pulse: 60   Resp: 18 SpO2: 97 % O2 Device: None (Room air)    Weight: 230 lbs 14.4 oz  Gen- pleasant   Neck- supple  CVS- I+II+ no m/r/g  RS- CTABS  Abdo- soft, no tenderness . No g/r/r  Ext-trace edema   Primary Care Physician   Physician No Ref-Primary    Discharge Orders      Cardiac Rehab  Referral      Hospital to Primary Care - Establish PCP Referral      Cardiac Rehab  Referral      Medication Instructions - Anticoagulants    Do NOT stop your aspirin or platelet inhibitor unless directed by your Cardiologist.  These medications help to prevent platelets in your blood from sticking together and forming a clot.  Examples of these medications are:  Ticagrelor (Brilinta), Clopidigrel (Plavix), Prasugrel (Effient)     When to call - Contact the Heart Clinic    You may experience symptoms that require follow-up before your scheduled appointment. Contact the Heart Clinic if you develop: Fever over 100.4o Fahrenheit, that lasts more than one  day; Redness, heat, or pus at the puncture site; Change in color or temperature in your hand or arm.     When to call - Reasons to Call 911    If your wrist puncture site starts bleeding after discharge, sit down and apply firm pressure with your thumb against the puncture site and fingers against the back of the wrist for 10 minutes. If the bleeding stops, continue to rest, keeping your wrist still for 2 hours. Notify your doctor as soon as possible.  IF BLEEDING DOES NOT STOP OR THERE IS A LARGER AMOUNT OF BLEEDING OR SPURTING CALL 9-1-1 immediately.DO NOT drive yourself to the hospital.     Precautions - Lifting    DO NOT lift more than 5 pounds with affected arm for 48 hours     Precautions - Household Activities    Avoid excessive bending or movement of your wrist for 72 hours.  Do not subject hand/arm to any forceful movements for 24 hours, such as supporting weight when rising from a chair or bed.     Remove the band-aid on the puncture site after 24 hours and leave open to air. If minor oozing, you may apply a band-aid and remove after 12 hours.     Precautions - Active Sports Activities    DO NOT engage in vigorous exercise using your affected arm for 3 days after discharge.  This includes golf, tennis or swimming.     Precautions - Operating yard equipment or vehicles    Do not operate a chainsaw, lawnmower, motorcycle, or all-terrain vehicle for 48 hours after the procedure.     Precautions - Elective Dental Work    NO elective dental work for 6 weeks after receiving a stent.     Comfort and Pain Management - Bruising after Surgery    Expect mild tingling of hand and tenderness at the wrist puncture site for up to 3 days. You may take Tylenol or a pain medicine recommended by your doctor.     Activity - Cardiac Rehab    You are encouraged to enroll in an Outpatient Cardiac Rehab program after discharge from the hospital.  Our Cardiac Rehab staff may visit briefly with you while you're in the hospital.  If  they miss you, someone will contact you after you are home.     Return to Driving    Driving is NOT permitted for 24 hours after surgery     Return to work    You may return to work after 72 hours if you are feeling well and your job does not involve heavy lifting.     Shower / Bathing    You may shower on the day after your procedure.  DO NOT soak of wrist with the puncture site in water for 3 days to prevent infection. DO NOT take a tub bath or wash dishes for 3 days after the procedure     Dressing Removal    Remove the band-aid on the puncture site after 24 hours and leave open to air. If minor oozing, you may apply a band-aid and remove after 12 hours     Reason Lipid Lowering Medications not prescribed from this order set     Reason for your hospital stay    Steve Morgan is a 64 year old male with a history of Htn, Hld, DM2, DAMIÁN, hypothyroidism who is admitted on 1/10/2025 with NSTEMI and multivessel disease requiring CV surgery consult.      NSTEMI  Severe 3V CAD  Hypertension  Dyslipidemia  Patient had abnormal dobutamine stress test on 1/9 and angiogram on 1/10 showing diffuse 3 vessel disease for which CV surgery consult was recommended. He has had progressive symptoms recently including worsening exertional chest pain and occasionally some pain at rest  CT VS did not offer surgery.  He underwent coronary angiogram and stent placement     Activity    Your activity upon discharge: activity as tolerated/as directed by cardiology     Follow Up    Follow up cardiology within 1 week     Diet    Follow this diet upon discharge: Current Diet:Orders Placed This Encounter      Low Saturated Fat Na <2400 mg       Significant Results and Procedures   Results for orders placed or performed during the hospital encounter of 01/10/25   US Carotid Bilateral    Narrative    EXAM: US CAROTID BILATERAL  LOCATION: [facility]  DATE: 1/12/2025    INDICATION: Pre op for poss CABG, carotid  bruit  COMPARISON: None.  TECHNIQUE: Duplex exam performed utilizing 2D gray-scale imaging, Doppler interrogation with color-flow and spectral waveform analysis. The percent diameter stenosis is determined using Updated Recommendations for Carotid Stenosis Interpretation Criteria   from [employer].    FINDINGS:    RIGHT: Mild plaque at the bifurcation. The peak systolic velocity in the ICA is less than 180 cm/sec, consistent with less than 50% stenosis. Normal velocities in the ECA. Antegrade flow within the vertebral artery.     LEFT: Mild plaque at the bifurcation. The peak systolic velocity in the ICA is less than 180 cm/sec, consistent with less than 50% stenosis. Normal velocities in the ECA. Antegrade flow within the vertebral artery.    VELOCITY CHART:  CCA   Right: 111/22 cm/s   Left: 110/13 cm/s  ICA   Right: 144/46 cm/s   Left: 113/35 cm/s  ECA   Right: 144/46 cm/s   Left: 113/35 cm/s  ICA/CCA PSV Ratio   Right: 1.3   Left: 1.0      Impression    IMPRESSION:  1.  Mild plaque formation, velocities consistent with less than 50% stenosis in the right internal carotid artery.  2.  Mild plaque formation, velocities consistent with less than 50% stenosis in the left internal carotid artery.  3.  Flow within the vertebral arteries is antegrade.   US Lower Extremity Venous Mapping Bilateral    Narrative    EXAM: US LOWER EXTREMITY VENOUS MAPPING BILATERAL  LOCATION: [facility]  DATE: 1/12/2025    INDICATION: pre op for poss CABG; lower extremity pain with pain/swelling.  COMPARISON: None.  TECHNIQUE: Ultrasound examination of the lower extremity veins was performed, including gray-scale and compression imaging.     LOWER  EXTREMITY FINDINGS:       VENOUS DIAMETERS  RIGHT GREAT SAPHENOUS VEIN  Junction: 3.8 mm  Prox Thigh: 3.0 mm  Prox/Mid Thigh: 2.9 mm  Mid Thigh: 3.5 mm  Mid/Dist Thigh: 2.9 mm  Dist Thigh: 2.6 mm  Knee: 3.3 mm  Prox Calf: 3.0 mm  Prox/Mid Calf: 2.1 mm  Mid  Calf: 2.9 mm  Mid/Dist Calf: 2.7 mm  Dist Calf: 3.0 mm  Ankle: 2.8 mm    LEFT GREAT SAPHENOUS VEIN  Junction: 5.5 mm  Prox Thigh: 4.4 mm  Prox/Mid Thigh: 4.0 mm  Mid Thigh: 3.6 mm  Mid/Dist Thigh: 2.9 mm  Dist Thigh: 3.0 mm  Knee: 3.5 mm  Prox Calf: 3.4 mm  Prox/Mid Calf: 3.0 mm  Mid Calf: 3.1 mm  Mid/Dist Calf: 3.0 mm  Dist Calf: 3.2 mm  Ankle: 3.6 mm      Impression    IMPRESSION:  1. Patent bilateral greater saphenous veins with measurements as noted above.   CT Chest w/o Contrast    Narrative    EXAM: CT CHEST W/O CONTRAST  LOCATION: [facility]  DATE: 1/12/2025    INDICATION: pre op for poss CABG, ascending aortic calcium  COMPARISON: None.  TECHNIQUE: CT chest without IV contrast. Multiplanar reformats were obtained. Dose reduction techniques were used.  CONTRAST: None.    FINDINGS:   LUNGS AND PLEURA: Tiny benign calcified granulomas in the right lung.    MEDIASTINUM/AXILLAE: Scattered nonpathologic sized lymph nodes. Minimal calcification involving the ascending thoracic aorta just to the right of the takeoff of the right coronary artery.     CORONARY ARTERY CALCIFICATION: Moderate.    UPPER ABDOMEN: The gallbladder is of increased density which may represent recent contrast or changes of sludge. There is mild cholelithiasis. No kasandra CT evidence to suggest acute cholecystitis. The bile ducts are normal in caliber.     MUSCULOSKELETAL: Mild scattered hypertrophic changes in the spine.      Impression    IMPRESSION:   1.  Minimal calcification ascending thoracic aorta.    2.  Residual contrast versus sludge and stones seen in the gallbladder with no kasandra CT evidence for cholecystitis.    3.  Moderate coronary artery calcification.    4.  Tiny benign calcified granulomas in the right lung.     US Upper Extremity Arterial Duplex Left    Narrative    EXAM: US UPPER EXTREMITY ARTERIAL DUPLEX LEFT  LOCATION: [facility]  DATE: 1/12/2025    INDICATION: Pre op for  poss CABG, upper extremity pain.  COMPARISON: None.  TECHNIQUE: Arterial Duplex ultrasound of the left arm. Color flow and spectral Doppler with waveform analysis performed.    FINDINGS (LEFT upper extremity):    ARTERIAL PEAK SYSTOLIC VELOCITIES (cm/s):  Brachial (distal): 120  Radial: 101  Ulnar: 106     Radial artery diameters (mm):  Proximal: 3.9 mm   Proximal Mid: 3.4 mm  Mid: 3.5 mm  Mid Distal: 3.4 mm  Distal: 3.2 mm    WAVEFORMS/COLOR DOPPLER: Multiphasic      Impression    IMPRESSION:   1.  Patent left radial artery with multiphasic waveforms. Measurements as noted above.   Cardiac Catheterization    Narrative    Successful HD-IVUS guided PCI from the distal RCA into the rPDA with   placement of a single 2.75 x 38 mm [place] HALEY, post-dilated up to   4.0 mm within the RCA istself  Successful HD-IVUS guided PCI of the mid RCA with placement of a single   4.0 x 22 mm [place] HALEY, postdilated up to 4.5 mm.      Recommend medical management of remaining CAD at this time.  If residual   angina unresponsive to medical therapy, could consider PCI of the LCx/OM   at that time.       *Note: Due to a large number of results and/or encounters for the requested time period, some results have not been displayed. A complete set of results can be found in Results Review.       Discharge Medications   Current Discharge Medication List        START taking these medications    Details   aspirin 81 MG EC tablet Take 1 tablet (81 mg) by mouth daily.  Qty: 30 tablet, Refills: 3    Associated Diagnoses: Coronary artery disease of native artery of native heart with stable angina pectoris      isosorbide mononitrate (IMDUR) 60 MG 24 hr tablet Take 1 tablet (60 mg) by mouth daily. DO NOT CRUSH. Can split tablet in half along score herve.  Qty: 30 tablet, Refills: 1    Associated Diagnoses: Coronary artery disease of native artery of native heart with stable angina pectoris      metoprolol tartrate (LOPRESSOR) 25 MG  tablet Take 0.5 tablets (12.5 mg) by mouth 2 times daily.  Qty: 30 tablet, Refills: 1    Associated Diagnoses: Coronary artery disease of native artery of native heart with stable angina pectoris      nitroGLYcerin (NITROSTAT) 0.4 MG sublingual tablet For chest pain place 1 tablet under the tongue every 5 minutes for 3 doses. If symptoms persist 5 minutes after 1st dose call 911.  Qty: 30 tablet, Refills: 11    Associated Diagnoses: Coronary artery disease of native artery of native heart with stable angina pectoris      prasugrel (EFFIENT) 10 MG TABS tablet Take 1 tablet (10 mg) by mouth daily. Do not crush or break tablet. Dose to start tomorrow.  Qty: 90 tablet, Refills: 3    Associated Diagnoses: Coronary artery disease of native artery of native heart with stable angina pectoris      rosuvastatin (CRESTOR) 40 MG tablet Take 1 tablet (40 mg) by mouth daily.  Qty: 30 tablet, Refills: 3    Associated Diagnoses: Coronary artery disease of native artery of native heart with stable angina pectoris           CONTINUE these medications which have CHANGED    Details   insulin glargine 100 UNIT/ML pen Inject 17 Units subcutaneously daily. DOSE CHANGE  Qty: 30 mL, Refills: 2    Comments: If Lantus is not covered by insurance, may substitute Basaglar or Semglee or other insulin glargine product per insurance preference at same dose and frequency.    Associated Diagnoses: Type 2 diabetes mellitus with diabetic neuropathy, with long-term current use of insulin (H)           CONTINUE these medications which have NOT CHANGED    Details   acetaminophen (TYLENOL) 325 MG tablet Take 2 tablets (650 mg) by mouth every 4 hours as needed for other (For optimal non-opioid multimodal pain management to improve pain control.)    Associated Diagnoses: Septic prepatellar bursitis, right      amLODIPine (NORVASC) 5 MG tablet Take 1 tablet by mouth daily at 2 pm.      Calcium Carb-Cholecalciferol (CALCIUM 600 + D PO) Take 1 tablet by mouth  daily      Continuous Glucose Sensor (FREESTYLE GIULIANA 2 SENSOR) [facility] Inject 1 each subcutaneously every 14 days. Use 1 sensor every 14 days. Use to read blood sugars per 's instructions.  Qty: 6 each, Refills: 0    Associated Diagnoses: Type 2 diabetes mellitus with diabetic neuropathy, with long-term current use of insulin (H)      finasteride (PROSCAR) 5 MG tablet Take 1 tablet (5 mg) by mouth daily.  Qty: 90 tablet, Refills: 1    Associated Diagnoses: Benign prostatic hyperplasia with lower urinary tract symptoms, symptom details unspecified      gabapentin (NEURONTIN) 300 MG capsule Take 1 capsule (300 mg) by mouth 3 times daily  Qty: 270 capsule, Refills: 1    Associated Diagnoses: Type 2 diabetes mellitus with diabetic neuropathy, with long-term current use of insulin (H)      insulin pen needle (B-D U/F) 31G X 5 MM miscellaneous USE 1 DAILY OR AS DIRECTED.  Qty: 100 each, Refills: 3    Associated Diagnoses: Hyperglycemia; Type 2 diabetes mellitus with diabetic neuropathy, without long-term current use of insulin (H)      latanoprost (XALATAN) 0.005 % ophthalmic solution INSTILL 1 DROP INTO BOTH EYES IN THE EVENING      LEVOXYL 137 MCG tablet Take 1 tablet (137 mcg) by mouth daily.  Qty: 90 tablet, Refills: 3    Associated Diagnoses: Hypothyroidism due to acquired atrophy of thyroid      mirtazapine (REMERON) 45 MG tablet Take 1 tablet (45 mg) by mouth at bedtime.  Qty: 90 tablet, Refills: 0    Associated Diagnoses: Major depressive disorder, recurrent episode, moderate (H)      MULTI-VITAMIN OR TABS Take 1 tablet by mouth daily  Qty: 30, Refills: 0      senna-docusate (SENOKOT-S/PERICOLACE) 8.6-50 MG tablet Take 1 tablet by mouth 2 times daily as needed for constipation  Qty: 20 tablet, Refills: 0    Associated Diagnoses: Septic prepatellar bursitis, right      sodium bicarbonate 325 MG tablet Take 325 mg by mouth 2 times daily.      vilazodone (VIIBRYD) 20 MG TABS tablet Take 1 tablet (20 mg) by  mouth daily.  Qty: 30 tablet, Refills: 1    Associated Diagnoses: Major depressive disorder, recurrent episode, moderate (H)           STOP taking these medications       glipiZIDE (GLUCOTROL XL) 10 MG 24 hr tablet Comments:   Reason for Stopping:         losartan (COZAAR) 100 MG tablet Comments:   Reason for Stopping:         metFORMIN (GLUCOPHAGE) 1000 MG tablet Comments:   Reason for Stopping:         simvastatin (ZOCOR) 20 MG tablet Comments:   Reason for Stopping:             Allergies   No Known Allergies

## 2025-01-15 NOTE — PROVIDER NOTIFICATION
MD Notification    Notified Person: MD    Notified Person Name:GURPREET Ramey.     Notification Date/Time: 1/15/2025 2120    Notification Interaction: [employer] messaging    Purpose of Notification: Pt. Has scheduled 2 week vacation to [facility] starting February 8th. Pt. Is asking if he is OK to go on that vacation.     Orders Received:    Comments: Per Provider will be discussed in follow-up. Depends on his recovery. Above information relayed to pt.

## 2025-01-15 NOTE — PROGRESS NOTES
[facility]  Cardiology Progress Note    Date of Service (when I saw the patient): 01/15/2025  Primary Cardiologist: No prior cardiology care- will establish with Dr. De Leon     Interval History:   Reports 4 episodes of few second long left sided sharp chest pain this morning while sitting. No symptoms during rehab. Spouse, Sri, on speaker phone. Denies palpitations, SOB, orthopnea, PND, or peripheral edema.     ----------------------------------------------------------------------------------------    Assessment:  Steve Morgan is a 64 year old male who was transferred from [facility] on 1/10/2025 for discussion of CABG versus high risk PCI.  He was admitted initially after abnormal dobutamine stress echocardiogram.    # NSTEMI  - Recent abnormal dobutamine stress echocardiogram as well as coronary angiogram [facility] demonstrating small caliber multivessel disease likely secondary to poor controlled diabetes mellitus  -- TTE dated 12/20/2024 shows preserved LVEF but possible mobile echodensity on aortic valve which is most consistent with nodular calcification   -- Felt to be not good candidate for CABG and was arranged for PCI   -- S/p PCI to mid and distal RCA with good results on 1/14. He does have diffuse LAD disease and small caliber LCX not amenable for PCI and thus will likely have chronic angina-- currently on imdur 30 mg daily and amlodipine 5 mg daily    # Hyperlipidemia  - Statin regimen included simvastatin 20 mg daily with baseline LDL of 130  -- Switched to rosuvastatin 40 mg on current admission    #Hypertension  - Suboptimal this morning but this is in the setting of us holding his PTA losartan 100 mg    # Longstanding history of T2DM with nephropathy  - hemoglobin A1c 7.2% in the setting of hemoglobin around 8 so A1c is likely higher    # Chronic anemia  - Stable between 8 and 9  - No reports of active bleeding    # CKD  - Stable     # Morbid obesity     #  DAMIÁN    ----------------------------------------------------------------------------------------    Plan:  - CP today is atypical likely msk in etiology but going forward patient will likely have chronic angina given residual CAD (diffuse LAD lesion and small caliber LCX)-- we will increase imdur from 30 mg to 60 mg daily; he is also on amlodipine 5 mg daily  - Recommend resuming losartan for better BP control which will help with angina management as well  -- Cardiology follow up has been arranged for next week   -- Outpatient cardiac rehab   -- Outpatient SL nitroglycerin prescription       ----------------------------------------------------------------------------------------  Physical Exam   Temp: 98.1  F (36.7  C) Temp src: Axillary BP: 127/64 Pulse: 60   Resp: 18 SpO2: 97 % O2 Device: None (Room air)    Vitals:    01/13/25 0427 01/14/25 0153 01/15/25 0518   Weight: 105.5 kg (232 lb 8 oz) 105.3 kg (232 lb 1.6 oz) 104.7 kg (230 lb 14.4 oz)     GEN:  NAD  NECK:  No JVD.  C/V:  Regular rate and rhythm, no murmur, rub or gallop.   RESP: CTA, lamine  GI: Abdomen soft, nontender, nondistended.    EXTREM: No pitting LE edema.   NEURO: Alert and oriented, cooperative.   PSYCH: Normal affect.  SKIN: Warm and dry.   VASC: 2+ radial and dorsalis pedis pulses bilaterally.      Medications   Current Facility-Administered Medications   Medication Dose Route Frequency Provider Last Rate Last Admin    Continuing beta blocker from home medication list OR beta blocker order already placed during this visit   Does not apply DOES NOT GO TO Lorne Angeles MD        Continuing statin from home medication list OR statin order already placed during this visit   Does not apply DOES NOT GO TO Lorne Angeles MD        Patient is already receiving anticoagulation with heparin, enoxaparin (LOVENOX), warfarin (COUMADIN)  or other anticoagulant medication   Does not apply Continuous PRN Lorenzo Don DO Percutaneous  Coronary Intervention orders placed (this is information for BPA alerting)   Does not apply DOES NOT GO TO Lorne Angeles MD         Current Facility-Administered Medications   Medication Dose Route Frequency Provider Last Rate Last Admin    amLODIPine (NORVASC) tablet 5 mg  5 mg Oral Daily Lorenzo Don DO   5 mg at 01/14/25 1321    aspirin EC tablet 81 mg  81 mg Oral Daily Lorne Garvey MD   81 mg at 01/15/25 0904    finasteride (PROSCAR) tablet 5 mg  5 mg Oral Daily Lorenzo Don DO   5 mg at 01/15/25 0905    insulin aspart (NovoLOG) injection (RAPID ACTING)  1-7 Units Subcutaneous TID AC Lorenzo Don DO   1 Units at 01/15/25 0905    insulin aspart (NovoLOG) injection (RAPID ACTING)  1-5 Units Subcutaneous At Bedtime Lorenzo Don DO   1 Units at 01/14/25 2131    insulin glargine (LANTUS PEN) injection 15 Units  15 Units Subcutaneous At Bedtime Brett Isidro MD   15 Units at 01/14/25 2131    isosorbide mononitrate (IMDUR) 24 hr tablet 30 mg  30 mg Oral Daily Angel De Leon MD   30 mg at 01/15/25 0904    latanoprost (XALATAN) 0.005 % ophthalmic solution 1 drop  1 drop Both Eyes At Bedtime Lorenzo Don DO   1 drop at 01/14/25 2133    levothyroxine (SYNTHROID/LEVOTHROID) tablet 137 mcg  137 mcg Oral QAM  Lorenzo Don DO   137 mcg at 01/15/25 0904    [Held by provider] losartan (COZAAR) tablet 100 mg  100 mg Oral Daily Lorenzo Don DO   100 mg at 01/13/25 0825    metoprolol tartrate (LOPRESSOR) half-tab 12.5 mg  12.5 mg Oral BID Angel De Leon MD   12.5 mg at 01/15/25 0905    mirtazapine (REMERON) tablet 45 mg  45 mg Oral At Bedtime Lorenzo Don DO   45 mg at 01/14/25 2128    prasugrel (EFFIENT) tablet 10 mg  10 mg Oral Daily Lorne Garvey MD   10 mg at 01/15/25 1107    rosuvastatin (CRESTOR) tablet 40 mg  40 mg Oral Daily Hank Limon PA-C   40 mg at 01/15/25 0904    sodium bicarbonate tablet 325 mg  325 mg Oral BID Lorenzo Don DO   325 mg at 01/15/25  0904    sodium chloride (PF) 0.9% PF flush 3 mL  3 mL Intracatheter Q8H Lorenzo Don DO   3 mL at 01/14/25 1321    vilazodone (VIIBRYD) tablet 20 mg  20 mg Oral Daily Lorenzo Don DO   20 mg at 01/14/25 2128       Data   Reviewed     Hank Limon PA-C   1/15/2025  Pager: [place]

## 2025-01-15 NOTE — CONSULTS
"CLINICAL NUTRITION SERVICES - ASSESSMENT NOTE    Malnutrition Status:    Patient does not meet two of the established criteria necessary for diagnosing malnutrition    Registered Dietitian Interventions:  Recommended [facility] Heart Healthy Diet upon discharge   Follow up with outpatient RD     REASON FOR ASSESSMENT    Steve Morgan is a 64 year old male seen by Registered Dietitian for positive admission nutrition risk screen and Provider order - Nutrition education - AHA     PMH : HTN, HLD, DM2, DAMIÁN, hypothyroidism, CKD stage IIIb, chronic anemia    - Admitted on 1/10/2025 with NSTEMI and 3V CAD    SUBJECTIVE INFORMATION  Assessed patient in room.    NUTRITION HISTORY  - Information attained from patient   - Diet at home regular  - Usual intakes: meals 2-3x/day    - Barriers to PO intake: none suspected  - Chewing/swallowing issues: none noted  - Meal preparation/grocery shopping: pt wife does majority of cooking  - Allergies: NKFA  - Pt has received previous nutrition education with a diabetes educator    Pt reported being unsure of making nutrition changes at this time, however reported he has lots of family support.      CURRENT NUTRITION ORDERS  Diet: Low Saturated Fat/2400 mg Sodium    CURRENT INTAKE/TOLERANCE  100% intakes per flowsheets, Pt ordering meals TID since admission when not NPO. Pt reported no decrease in intake of meals prior to admission     LABS  Nutrition-relevant labs:  Glucose 104-219 (NL-H), Sodium 128 (L), BUN 28.7 (H), Creatinine 1.96 (H)    MEDICATIONS  Nutrition-relevant medications:  Norvasc, Medium sliding scale insulin, Lantus 15 units, Imdur, Synthroid/levothroid, Cozaar, lopressor, Effient, Crestor     ANTHROPOMETRICS  Height: 165.1 cm  (5' 5\")  Most Recent Weight: 104.7 kg (230 lb 14.4 oz)  IBW: 61.6 kg  % IBW: 170%  BMI (kg/m ): Obesity Class II BMI 35-39.9  Weight History:    Wt Readings from Last 10 Encounters:   01/15/25 104.7 kg (230 lb 14.4 oz)   01/09/25 106.1 kg (233 lb 12.8 " oz)   01/06/25 108 kg (238 lb)   12/12/24 109.3 kg (241 lb)   11/27/24 109.3 kg (241 lb)   11/21/24 110.7 kg (244 lb)   09/09/24 107.5 kg (237 lb)   07/09/24 109.4 kg (241 lb 3.2 oz)   06/18/24 113.4 kg (250 lb)   05/17/24 113.4 kg (250 lb)     - 4% WL in 1 month    Dosing Weight: 72.3 kg, based on adjusted wt    ASSESSED NUTRITION NEEDS  Estimated Energy Needs: 1,810- 2,170 kcals/day (25 - 30 kcals/kg)  Justification: Obese  Estimated Protein Needs: 72- 87 grams protein/day (1 - 1.2 grams of pro/kg)  Justification: Maintenance  Estimated Fluid Needs:  (1 mL/kcal)  Justification: Maintenance    SYSTEM FINDINGS    Skin/wounds: no pressure injuries noted  GI symptoms: WDL    MALNUTRITION  % Intake: No decreased intake noted    % Weight Loss: Weight loss does not meet criteria     Subcutaneous Fat Loss: None observed    Muscle Loss: None observed    Fluid Accumulation/Edema: Mild, 1+ edema in LE (suspect non-nutrition related)    Malnutrition Diagnosis: Patient does not meet two of the established criteria necessary for diagnosing malnutrition    Malnutrition Present on Admission: No    NUTRITION DIAGNOSIS    Not ready for diet/lifestyle change as evidenced by lack of readiness to change in acute setting per patient, and need for nutrition education.     INTERVENTIONS:  Nutrition Prescription:  Recommended AHA Heart Healthy Diet     Implementation:   Nutrition Education (Content):  reviewed Heart Healthy Diet guidelines  provided heart healthy diet handouts  Nutrition Education (Application):  Discussed current eating habits and recommended alternative food choices  Discussed label reading  Discussed grocery shopping tips   Anticipate good compliance  Diet Education - refer to Education flowsheet    Goals:  Patient verbalizes understanding of diet by having no clarifying questions when prompted   All of the above goals met during education session  Patient to consume % of nutritionally adequate meal trays TID, or  the equivalent with supplements/snacks.    Follow Up/Monitoring:  Follow up with outpatient RD if questions arise.        Haley Gordon   Dietetic Intern

## 2025-01-15 NOTE — PLAN OF CARE
Goal Outcome Evaluation:    Monitor shows Sinus rhythm. Pt. Reports several fleeting episodes of left chest discomfort this am. Reports no chest pain this afternoon. Right radial site stable. Pt. Ambulated in dillon with one assist and walker. Tolerated activity well. Plan for discharge to home this evening.

## 2025-01-15 NOTE — PROGRESS NOTES
A&Ox4. VSS on RA. Tele SB. Up with 1, GB and walker. Right radial site from Angio today. TR band removed. Arm board in place. Band-Aid on. CMS intact. Patient ambulated, ate, and voided post angio. Denies pain. Home C-pap on overnight.

## 2025-01-15 NOTE — PLAN OF CARE
Goal Outcome Evaluation:  Neuro- x4  Most Recent Vitals- BP (!) 148/79 (BP Location: Right arm)   Pulse 60   Temp 98.3  F (36.8  C) (Axillary)   Resp 17   Wt 104.7 kg (230 lb 14.4 oz)   SpO2 96%   BMI 38.42 kg/m    Tele/Cardiac- SR denies CP  Resp- RA denies sob/paez   Activity- sba walker   Pain- denies   Skin- R radial site WDL   GI/- WDL   Blood sugar 257 received insulin per orders  Diet: Low Saturated Fat Na <2400 mg  Diet    Plan- pt discharged home w/ wife. All belongings present, new medications and education completed by RN. Pt escorted to door via staff

## 2025-01-16 ENCOUNTER — PATIENT OUTREACH (OUTPATIENT)
Dept: CARE COORDINATION | Facility: CLINIC | Age: 65
End: 2025-01-16
Payer: MEDICARE

## 2025-01-16 ENCOUNTER — TELEPHONE (OUTPATIENT)
Dept: CARDIOLOGY | Facility: CLINIC | Age: 65
End: 2025-01-16
Payer: MEDICARE

## 2025-01-16 NOTE — PLAN OF CARE
Cardiac Rehab Discharge Summary    Reason for therapy discharge:    Discharged to home with outpatient therapy.    Progress towards therapy goal(s). See goals on Care Plan in [facility] electronic health record for goal details.  Goals partially met.  Barriers to achieving goals:   discharge from facility and discharge on same date as initial evaluation.    Therapy recommendation(s):    Continued therapy is recommended.  Rationale/Recommendations:  outpatient cardiac rehab for monitored progression of activity and education to promote overall heart health.

## 2025-01-16 NOTE — PROGRESS NOTES
Connected Care Resource Center: [facility]    Background: Transitional Care Management program identified per system criteria and reviewed by [facility] [place] team for possible outreach.    Assessment: Upon chart review, CCR Team member will not proceed with patient outreach related to this episode of Transitional Care Management program due to reason below:    Patient has active communication with a nurse, provider or care team for reason of post-hospital follow up plan.  Outreach call by CCRC team not indicated to minimize duplicative efforts.     Plan: Transitional Care Management episode addressed appropriately per reason noted above.      Loretta Jones  Community Health Worker  [facility]  [phone]      *Connected Care Resource Team does NOT follow patient ongoing. Referrals are identified based on internal discharge reports and the outreach is to ensure patient has an understanding of their discharge instructions.

## 2025-01-16 NOTE — TELEPHONE ENCOUNTER
Patient was admitted to [facility] on 1/10/25 after being transferred from [facility] for 6-month history of exercise associated dyspnea and substernal chest discomfort. He saw Dr. Jed Butler who referred him for a dobutamine stress echocardiogram. During the dobutamine stress echo today, the patient developed prolonged chest discomfort accompanied by anterior apical hypokinesis during the stress test. The patient's symptoms and wall motion abnormality  resolved with rest, but because of prolonged symptoms and the findings on his stress echo, he was sent to the ED. Coronary angiogram showed MV CAD and pt transferred to [facility] for CV surgery consultation.    PMH: obesity, hypertension, type 2 diabetes mellitus, and chronic kidney disease.     12/20/25: Echo showed preserved LVEF but possible mobile echodensity on aortic valve which is most consistent with nodular calcification.     1/10/25: Coronary angiogram via RRA completed at [facility] showed:    Extensive multivessel CAD as described below.  Unfortunately, majority of disease is distal vessel, with generally suboptimal graft targets.  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  The OM branch has a 90% stenosis proximally.  RCA: This is the dominant vessel and is relatively healthy within the RCA itself other than a moderate focal mid RCA stenosis.  However, the RPDA has a 90% proximal stenosis with moderate diffuse disease elsewhere.  The RPL system is relatively healthy mild-moderate diffuse disease.     Recommend transfer to [facility] for multidisciplinary evaluation regarding revascularization (CABG versus PCI). Given extensive coronary disease, CABG  would typically be preferred, but unfortunately distal graft targets (particularly within the LAD) are very poor in this case.    - Felt to be not good candidate for CABG and was arranged for PCI but due to elevated creatinine and possible worsening anemia this was deferred.     1/14/25: Coronary angiogram via RRA resulted in:    Successful HD-IVUS guided PCI from the distal RCA into the rPDA with placement of a single 2.75 x 38 mm Magnus Bertie HALEY, post-dilated up to 4.0 mm within the RCA istself  Successful HD-IVUS guided PCI of the mid RCA with placement of a single 4.0 x 22 mm Minier Bertie HALEY, postdilated up to 4.5 mm.     Recommend medical management of remaining CAD at this time. If residual angina unresponsive to medical therapy, could consider PCI of the LCx/OM at that time.    Pt was started on ASA, Imdur, Metoprolol, NTG, Effient and Crestor. PTA Glipizide, Metformin, Zocor and Losartan was discontinued.    Pt is scheduled for an OV on 1/23/25 at 1355 with ELVIA Jacqueline Holloway at our [place] Office.    Cardiac rehab is scheduled on 1/22/25 at 0945 in [place].    Writer attempted to call pt for a cardiology post discharge phone call, but no answer. VM left to return my call. BILL Cheng RN.

## 2025-01-17 NOTE — PROGRESS NOTES
Outcome for 01/17/25 9:57 AM: [employer] message sent  Carmelita Mendoza MA  Outcome for 01/27/25 1:31 PM: Data obtained via [employer] website  Carmelita Mendoza MA    Patient is showing 4/5 MNCM met. BP out range   Carmelita Mendoza MA

## 2025-01-21 ENCOUNTER — MYC MEDICAL ADVICE (OUTPATIENT)
Dept: EDUCATION SERVICES | Facility: CLINIC | Age: 65
End: 2025-01-21
Payer: MEDICARE

## 2025-01-21 ENCOUNTER — TELEPHONE (OUTPATIENT)
Dept: ENDOCRINOLOGY | Facility: CLINIC | Age: 65
End: 2025-01-21
Payer: MEDICARE

## 2025-01-21 ENCOUNTER — MYC REFILL (OUTPATIENT)
Dept: ENDOCRINOLOGY | Facility: CLINIC | Age: 65
End: 2025-01-21
Payer: MEDICARE

## 2025-01-21 DIAGNOSIS — E11.40 TYPE 2 DIABETES MELLITUS WITH DIABETIC NEUROPATHY, WITHOUT LONG-TERM CURRENT USE OF INSULIN (H): ICD-10-CM

## 2025-01-21 DIAGNOSIS — R73.9 HYPERGLYCEMIA: ICD-10-CM

## 2025-01-21 RX ORDER — FLURBIPROFEN SODIUM 0.3 MG/ML
SOLUTION/ DROPS OPHTHALMIC
Qty: 100 EACH | Refills: 3 | OUTPATIENT
Start: 2025-01-21

## 2025-01-21 NOTE — TELEPHONE ENCOUNTER
[facility] Call Center    Phone Message    May a detailed message be left on voicemail: yes     Reason for Call: Other: Patient just got out of the hospital and blood sugars are running high, please call back to discuss, also has a Diab Ed appt tomorrow.       Action Taken: Message routed to:  [facility] Endo    Travel Screening: Not Applicable     Date of Service:

## 2025-01-21 NOTE — TELEPHONE ENCOUNTER
Spoke with patient. He states he just put the juan back on last night and BG today are 300-400. He reports when he was discharged from the hospital his insulin was changed from 35 units/day to 17 units per day. Pt reports he already took his insulin this morning. He is using juan with the reader so we cannot see readings remotely. He reports he does not have a regular BG meter at home, he is unsure of what his readings have been for the last several days as he was not using the juan. He denies any symptoms of high BG/DKA.     Pt has appt with CDE tomorrow and will assess then.

## 2025-01-22 ENCOUNTER — ALLIED HEALTH/NURSE VISIT (OUTPATIENT)
Dept: EDUCATION SERVICES | Facility: CLINIC | Age: 65
End: 2025-01-22
Payer: MEDICARE

## 2025-01-22 ENCOUNTER — HOSPITAL ENCOUNTER (OUTPATIENT)
Dept: CARDIAC REHAB | Facility: CLINIC | Age: 65
Discharge: HOME | End: 2025-01-22
Attending: STUDENT IN AN ORGANIZED HEALTH CARE EDUCATION/TRAINING PROGRAM
Payer: MEDICARE

## 2025-01-22 DIAGNOSIS — I21.4 NSTEMI (NON-ST ELEVATED MYOCARDIAL INFARCTION) (H): ICD-10-CM

## 2025-01-22 DIAGNOSIS — I25.118 CORONARY ARTERY DISEASE OF NATIVE ARTERY OF NATIVE HEART WITH STABLE ANGINA PECTORIS: ICD-10-CM

## 2025-01-22 DIAGNOSIS — E11.40 TYPE 2 DIABETES MELLITUS WITH DIABETIC NEUROPATHY, WITH LONG-TERM CURRENT USE OF INSULIN (H): Primary | ICD-10-CM

## 2025-01-22 DIAGNOSIS — Z79.4 TYPE 2 DIABETES MELLITUS WITH DIABETIC NEUROPATHY, WITH LONG-TERM CURRENT USE OF INSULIN (H): Primary | ICD-10-CM

## 2025-01-22 PROCEDURE — 93798 PHYS/QHP OP CAR RHAB W/ECG: CPT

## 2025-01-22 PROCEDURE — G0108 DIAB MANAGE TRN  PER INDIV: HCPCS

## 2025-01-22 PROCEDURE — 93797 PHYS/QHP OP CAR RHAB WO ECG: CPT

## 2025-01-22 NOTE — PROGRESS NOTES
"  Diabetes Self-Management Education & Support    Presents for: Individual review    Type of Service: In Person Visit      Assessment  Patient is requesting assistance with his elevated glucose, meter is reading \"HI\". He was discharged from the hospital last week and has been experiencing high blood sugars since then. He states he was getting insulin shots at meals while in the hospital. They gave him the pen when he was discharged but did not have instruction on how to take it. He admits his eating is not as controlled as when he was in the hosptital.  He has a planned trip to [facility] on February 8th and would like to have better control for his vacation.     Notes from the hospital discharge:  -Decreased Lantus 15 units at bedtime(PTA 35 units) in hospital and 17 units at bedtime on discharge  -Sliding scale insulin aspart with carb controlled diet  -Discontinued metformin and glipizide due to CKD    Do Not give Correction Insulin if Pre-Meal BG less than 140.   For Pre-Meal  - 189 give 1 unit.   For Pre-Meal  - 239 give 2 units.   For Pre-Meal  - 289 give 3 units.   For Pre-Meal  - 339 give 4 units.   For Pre-Meal - 389 give 5 units.   For Pre-Meal -439 give 6 units   For Pre-Meal BG greater than or equal to 440 give 7 units.     MEDIUM INSULIN RESISTANCE DOSING     Do Not give Bedtime Correction Insulin if BG less than  200.   For  - 249 give 1 units.   For  - 299 give 2 units.   For  - 349 give 3 units.   For  -399 give 4 units.   For BG greater than or equal to 400 give 5 units.       See CGM Reports in Monitoring section below.    Glucose is consistently above goal, his oral meds were discontinued in the hospital due to CKD and mealtime insulin was added. Patient has Not been taking the mealtime insulin because he did not have instructions. Recommend patient resume the mealtime insulin as he was receiving in the hospital. Recommend an increase " to the Lantus from 17 units to 20 units for better basal/24 hour coverage.       Care Plan and Education Provided:  Healthy Eating: Consistency in amount and timing of carbohydrate intake, Monitoring: Individual glucose targets, Log and interpret results, and CGM instruction: Patient was instructed on Freestyle Nina System: Use of trends and graphs for pattern management and problem solving and Dosing insulin based on sensor glucose results, Taking Medication: Action of prescribed medication(s), Side effects of prescribed medication(s), and When to take medication(s), and Problem Solving: High glucose - causes, signs/symptoms, treatment and prevention and Low glucose - causes, signs/symptoms, treatment and prevention    Patient verbalized understanding of diabetes self-management education concepts discussed, opportunities for ongoing education and support, and recommendations provided today.    Plan  Meal Plan Recommendation: eat 3 meals a day, have small snacks between meals, if needed, and use portion control.  Monitoring: scan your sensor when you wake up, before meals and bedtime.  -enter the insulin dose into notes. (the pencil at the top of the screen on the nina reader)    Medication:    Lantus: increase to 20 units every morning    Novolog/Humalog: use the follow scale:    Check your blood sugar before breakfast, lunch and dinner and take the following Novolog/Humalog dose:   Do Not give Correction Insulin if Pre-Meal BG less than 140.   For Pre-Meal  - 189 give 1 unit.   For Pre-Meal  - 239 give 2 units.   For Pre-Meal  - 289 give 3 units.   For Pre-Meal  - 339 give 4 units.   For Pre-Meal - 389 give 5 units.   For Pre-Meal -439 give 6 units   For Pre-Meal BG greater than or equal to 440 give 7 units.     Check your blood sugar before bed and take the following Novolog/Humalog dose:   Do Not give Bedtime Correction Insulin if BG less than  200.   For  - 249 give  1 units.   For  - 299 give 2 units.   For  - 349 give 3 units.   For  -399 give 4 units.   For BG greater than or equal to 400 give 5 units.     Hypoglycemia:  Low blood sugar is anything number below 80mg/dL.  Signs/symptoms of low blood sugar include (but are not limited to): sweating, shaking, feeling dizzy or weak, extreme hunger, headache, feeling crabby or confused, numbness or tingling of mouth/lips.  If you have these symptoms check your blood sugar if you can and then consume carbohydrate (sugar)  If your blood sugar is < 80 mg/dL, consume 15 grams of fast-acting carbohydrate. Wait 15 minutes. Check blood sugar again and if not rising, repeat.  Examples of 15 grams of carbohydrate:  - 4 glucose tabs   - 4 oz juice or non-diet pop   - 2 Tbsp dried fruit   - 5-7 lifesavers or hard candy that you can chew  - 1 Tbsp sugar or honey   If your blood sugar is < 50 mg/dL, consume 30 grams of fast-acting carbohydrate (8 glucose tabs OR 8 oz juice or non-diet pop OR 4 Tbsp dried fruit OR 10-14 lifesavers OR 2 Tbsp sugar) and wait 15 minutes. Check blood sugar again and if not rising, repeat.    Please make sure to always keep a source of fast acting carbohydrate with you at all times (as listed above, but not limited to).  Place something in your car, at your bedside, in your purse/pocket etc.      Follow up:  Sunday evening, January 26th. Download your [employer] reader to [email or web address] and send a [employer] message to [employer] that it is download, and include any insight or questions you have about your blood sugar readings.     Follow-up diabetes education appointment scheduled on February 5th at 1:00pm. We can discuss your insulin and travel at this appointment.     See Care Plan for co-developed, patient-state behavior change goals.    Education Materials Provided:  No new materials provided today      Subjective/Objective  Caio is an 64 year old year old, presenting for the following diabetes education  "related to: Presents for: Individual review  Accompanied by: Self  Diabetes education in the past 24mo: No  Focus of Visit: Taking Medication  Diabetes type: Type 2  Disease course: Worsening  How confident are you filling out medical forms by yourself:: Extremely  Diabetes management related comments/concerns: high blood sugar, questions about mealtime insulin given in the hospital  Transportation concerns: No  Difficulty affording diabetes medication?: Sometimes  Difficulty affording diabetes testing supplies?: Sometimes  Other concerns:: Physical impairment  Cultural Influences/Ethnic Background:  Not  or     Diabetes Symptoms & Complications:  Diabetes Related Symptoms: Fatigue, Polyphagia (increased hunger), Visual change  Weight trend: Increasing  Symptom course: Stable  Disease course: Worsening  Complications assessed today?: No    Patient Problem List and Family Medical History reviewed for relevant medical history, current medical status, and diabetes risk factors.    Vitals:  There were no vitals taken for this visit.  Estimated body mass index is 38.42 kg/m  as calculated from the following:    Height as of 1/9/25: 1.651 m (5' 5\").    Weight as of 1/15/25: 104.7 kg (230 lb 14.4 oz).   Last 3 BP:   BP Readings from Last 3 Encounters:   01/15/25 (!) 148/79   01/10/25 (!) 165/84   01/06/25 (!) 144/75       History   Smoking Status    Never   Smokeless Tobacco    Never       Labs:  Lab Results   Component Value Date    A1C 7.2 01/06/2025    A1C 7.4 02/22/2021     Lab Results   Component Value Date     01/15/2025     09/24/2021     01/16/2020     Lab Results   Component Value Date    LDL 51 01/15/2025    LDL 88 03/05/2020     HDL Cholesterol   Date Value Ref Range Status   03/05/2020 33 (L) >39 mg/dL Final     Direct Measure HDL   Date Value Ref Range Status   01/15/2025 29 (L) >=40 mg/dL Final   ]  GFR Estimate   Date Value Ref Range Status   01/15/2025 37 (L) >60 " "mL/min/1.73m2 Final     Comment:     eGFR calculated using 2021 CKD-EPI equation.   01/16/2020 79 >60 mL/min/[1.73_m2] Final     Comment:     Non  GFR Calc  Starting 12/18/2018, serum creatinine based estimated GFR (eGFR) will be   calculated using the Chronic Kidney Disease Epidemiology Collaboration   (CKD-EPI) equation.       GFR Estimate If Black   Date Value Ref Range Status   01/16/2020 >90 >60 mL/min/[1.73_m2] Final     Comment:      GFR Calc  Starting 12/18/2018, serum creatinine based estimated GFR (eGFR) will be   calculated using the Chronic Kidney Disease Epidemiology Collaboration   (CKD-EPI) equation.       Lab Results   Component Value Date    CR 1.96 01/15/2025    CR 1.01 01/16/2020     No results found for: \"MICROALBUMIN\"    Healthy Eating:  Healthy Eating Assessed Today: Yes  Cultural/Taoism diet restrictions?: No  How many times a week on average do you eat food made away from home (restaurant/take-out)?: 1  Meals include: Breakfast, Lunch, Dinner, Evening Snack  Breakfast: cereal ( shredded wheat, cheerios with splenda), milk, yogurt(light and fit) Or eggs  Lunch: skip or ham sandwich  Dinner: [facility] boiled dinner- (beef bone broth and vegetables) OR meatloaf, mashed potatoes,  Snacks: BBQ chips, pretzels, salty snacks, sweets- cookies, cake, candy  Beverages: Water, Milk  Has patient met with a dietitian in the past?: Yes      Monitoring:  Monitoring Assessed Today: Yes  Did patient bring glucose meter to appointment? : Yes  Blood Glucose Meter: FreeStyle, CGM  Times checking blood sugar at home (number): Other (see Comments)  Times checking blood sugar at home (per): Day            Taking Medications:  Diabetes Medication(s)       Insulin       insulin aspart (NOVOLOG PEN) 100 UNIT/ML pen 4 times a day before meals and at bedtime. Sliding scale of 1:50> 150. Est TDD: 50 units/day     insulin glargine 100 UNIT/ML pen Inject 17 Units subcutaneously daily. " DOSE CHANGE          Taking Medication Assessed Today: Yes  Current Treatments: Insulin Injections  Dose schedule: Pre-breakfast  Given by: Patient  Injection/Infusion sites: Abdomen  Problems taking diabetes medications regularly?: Yes (consistently taking the insulin but misses oral medication)  Diabetes medication side effects?: No    Problem Solving:  Problem Solving Assessed Today: Yes  Is the patient at risk for hypoglycemia?: Yes  Hypoglycemia Frequency: Other (not recently)  Hypoglycemia: Feeling shaky       Megan Case RN  Time Spent: 60 minutes  Encounter Type: Individual    Any diabetes medication dose changes were made via the CDCES Standing Orders under the patient's referring provider.

## 2025-01-22 NOTE — LETTER
"    1/22/2025         RE: Steve Morgan  [id number]  [facility] [address]        Dear Colleague,    Thank you for referring your patient, Steve Morgan, to the [facility]. Please see a copy of my visit note below.      Diabetes Self-Management Education & Support    Presents for: Individual review    Type of Service: In Person Visit      Assessment  Patient is requesting assistance with his elevated glucose, meter is reading \"HI\". He was discharged from the hospital last week and has been experiencing high blood sugars since then. He states he was getting insulin shots at meals while in the hospital. They gave him the pen when he was discharged but did not have instruction on how to take it. He admits his eating is not as controlled as when he was in the hosptital.  He has a planned trip to [facility] on February 8th and would like to have better control for his vacation.     Notes from the hospital discharge:  -Decreased Lantus 15 units at bedtime(PTA 35 units) in hospital and 17 units at bedtime on discharge  -Sliding scale insulin aspart with carb controlled diet  -Discontinued metformin and glipizide due to CKD    Do Not give Correction Insulin if Pre-Meal BG less than 140.   For Pre-Meal  - 189 give 1 unit.   For Pre-Meal  - 239 give 2 units.   For Pre-Meal  - 289 give 3 units.   For Pre-Meal  - 339 give 4 units.   For Pre-Meal - 389 give 5 units.   For Pre-Meal -439 give 6 units   For Pre-Meal BG greater than or equal to 440 give 7 units.     MEDIUM INSULIN RESISTANCE DOSING     Do Not give Bedtime Correction Insulin if BG less than  200.   For  - 249 give 1 units.   For  - 299 give 2 units.   For  - 349 give 3 units.   For  -399 give 4 units.   For BG greater than or equal to 400 give 5 units.       See CGM Reports in Monitoring section below.    Glucose is consistently above goal, his oral meds were " discontinued in the hospital due to CKD and mealtime insulin was added. Patient has Not been taking the mealtime insulin because he did not have instructions. Recommend patient resume the mealtime insulin as he was receiving in the hospital. Recommend an increase to the Lantus from 17 units to 20 units for better basal/24 hour coverage.       Care Plan and Education Provided:  Healthy Eating: Consistency in amount and timing of carbohydrate intake, Monitoring: Individual glucose targets, Log and interpret results, and CGM instruction: Patient was instructed on Freestyle Nina System: Use of trends and graphs for pattern management and problem solving and Dosing insulin based on sensor glucose results, Taking Medication: Action of prescribed medication(s), Side effects of prescribed medication(s), and When to take medication(s), and Problem Solving: High glucose - causes, signs/symptoms, treatment and prevention and Low glucose - causes, signs/symptoms, treatment and prevention    Patient verbalized understanding of diabetes self-management education concepts discussed, opportunities for ongoing education and support, and recommendations provided today.    Plan  Meal Plan Recommendation: eat 3 meals a day, have small snacks between meals, if needed, and use portion control.  Monitoring: scan your sensor when you wake up, before meals and bedtime.  -enter the insulin dose into notes. (the pencil at the top of the screen on the nina reader)    Medication:    Lantus: increase to 20 units every morning    Novolog/Humalog: use the follow scale:    Check your blood sugar before breakfast, lunch and dinner and take the following Novolog/Humalog dose:   Do Not give Correction Insulin if Pre-Meal BG less than 140.   For Pre-Meal  - 189 give 1 unit.   For Pre-Meal  - 239 give 2 units.   For Pre-Meal  - 289 give 3 units.   For Pre-Meal  - 339 give 4 units.   For Pre-Meal - 389 give 5 units.   For  Pre-Meal -439 give 6 units   For Pre-Meal BG greater than or equal to 440 give 7 units.     Check your blood sugar before bed and take the following Novolog/Humalog dose:   Do Not give Bedtime Correction Insulin if BG less than  200.   For  - 249 give 1 units.   For  - 299 give 2 units.   For  - 349 give 3 units.   For  -399 give 4 units.   For BG greater than or equal to 400 give 5 units.     Hypoglycemia:  Low blood sugar is anything number below 80mg/dL.  Signs/symptoms of low blood sugar include (but are not limited to): sweating, shaking, feeling dizzy or weak, extreme hunger, headache, feeling crabby or confused, numbness or tingling of mouth/lips.  If you have these symptoms check your blood sugar if you can and then consume carbohydrate (sugar)  If your blood sugar is < 80 mg/dL, consume 15 grams of fast-acting carbohydrate. Wait 15 minutes. Check blood sugar again and if not rising, repeat.  Examples of 15 grams of carbohydrate:  - 4 glucose tabs   - 4 oz juice or non-diet pop   - 2 Tbsp dried fruit   - 5-7 lifesavers or hard candy that you can chew  - 1 Tbsp sugar or honey   If your blood sugar is < 50 mg/dL, consume 30 grams of fast-acting carbohydrate (8 glucose tabs OR 8 oz juice or non-diet pop OR 4 Tbsp dried fruit OR 10-14 lifesavers OR 2 Tbsp sugar) and wait 15 minutes. Check blood sugar again and if not rising, repeat.    Please make sure to always keep a source of fast acting carbohydrate with you at all times (as listed above, but not limited to).  Place something in your car, at your bedside, in your purse/pocket etc.      Follow up:  Daquan evening, January 26th. Download your [employer] reader to [email or web address] and send a [employer] message to [employer] that it is download, and include any insight or questions you have about your blood sugar readings.     Follow-up diabetes education appointment scheduled on February 5th at 1:00pm. We can discuss your insulin and travel at  "this appointment.     See Care Plan for co-developed, patient-state behavior change goals.    Education Materials Provided:  No new materials provided today      Subjective/Objective  Caio is an 64 year old year old, presenting for the following diabetes education related to: Presents for: Individual review  Accompanied by: Self  Diabetes education in the past 24mo: No  Focus of Visit: Taking Medication  Diabetes type: Type 2  Disease course: Worsening  How confident are you filling out medical forms by yourself:: Extremely  Diabetes management related comments/concerns: high blood sugar, questions about mealtime insulin given in the hospital  Transportation concerns: No  Difficulty affording diabetes medication?: Sometimes  Difficulty affording diabetes testing supplies?: Sometimes  Other concerns:: Physical impairment  Cultural Influences/Ethnic Background:  Not  or     Diabetes Symptoms & Complications:  Diabetes Related Symptoms: Fatigue, Polyphagia (increased hunger), Visual change  Weight trend: Increasing  Symptom course: Stable  Disease course: Worsening  Complications assessed today?: No    Patient Problem List and Family Medical History reviewed for relevant medical history, current medical status, and diabetes risk factors.    Vitals:  There were no vitals taken for this visit.  Estimated body mass index is 38.42 kg/m  as calculated from the following:    Height as of 1/9/25: 1.651 m (5' 5\").    Weight as of 1/15/25: 104.7 kg (230 lb 14.4 oz).   Last 3 BP:   BP Readings from Last 3 Encounters:   01/15/25 (!) 148/79   01/10/25 (!) 165/84   01/06/25 (!) 144/75       History   Smoking Status     Never   Smokeless Tobacco     Never       Labs:  Lab Results   Component Value Date    A1C 7.2 01/06/2025    A1C 7.4 02/22/2021     Lab Results   Component Value Date     01/15/2025     09/24/2021     01/16/2020     Lab Results   Component Value Date    LDL 51 01/15/2025    LDL 88 " "03/05/2020     HDL Cholesterol   Date Value Ref Range Status   03/05/2020 33 (L) >39 mg/dL Final     Direct Measure HDL   Date Value Ref Range Status   01/15/2025 29 (L) >=40 mg/dL Final   ]  GFR Estimate   Date Value Ref Range Status   01/15/2025 37 (L) >60 mL/min/1.73m2 Final     Comment:     eGFR calculated using 2021 CKD-EPI equation.   01/16/2020 79 >60 mL/min/[1.73_m2] Final     Comment:     Non  GFR Calc  Starting 12/18/2018, serum creatinine based estimated GFR (eGFR) will be   calculated using the Chronic Kidney Disease Epidemiology Collaboration   (CKD-EPI) equation.       GFR Estimate If Black   Date Value Ref Range Status   01/16/2020 >90 >60 mL/min/[1.73_m2] Final     Comment:      GFR Calc  Starting 12/18/2018, serum creatinine based estimated GFR (eGFR) will be   calculated using the Chronic Kidney Disease Epidemiology Collaboration   (CKD-EPI) equation.       Lab Results   Component Value Date    CR 1.96 01/15/2025    CR 1.01 01/16/2020     No results found for: \"MICROALBUMIN\"    Healthy Eating:  Healthy Eating Assessed Today: Yes  Cultural/Voodoo diet restrictions?: No  How many times a week on average do you eat food made away from home (restaurant/take-out)?: 1  Meals include: Breakfast, Lunch, Dinner, Evening Snack  Breakfast: cereal ( shredded wheat, cheerios with splenda), milk, yogurt(light and fit) Or eggs  Lunch: skip or ham sandwich  Dinner: [place] boiled dinner- (beef bone broth and vegetables) OR meatloaf, mashed potatoes,  Snacks: BBQ chips, pretzels, salty snacks, sweets- cookies, cake, candy  Beverages: Water, Milk  Has patient met with a dietitian in the past?: Yes      Monitoring:  Monitoring Assessed Today: Yes  Did patient bring glucose meter to appointment? : Yes  Blood Glucose Meter: FreeStyle, CGM  Times checking blood sugar at home (number): Other (see Comments)  Times checking blood sugar at home (per): Day            Taking " Medications:  Diabetes Medication(s)       Insulin       insulin aspart (NOVOLOG PEN) 100 UNIT/ML pen 4 times a day before meals and at bedtime. Sliding scale of 1:50> 150. Est TDD: 50 units/day     insulin glargine 100 UNIT/ML pen Inject 17 Units subcutaneously daily. DOSE CHANGE          Taking Medication Assessed Today: Yes  Current Treatments: Insulin Injections  Dose schedule: Pre-breakfast  Given by: Patient  Injection/Infusion sites: Abdomen  Problems taking diabetes medications regularly?: Yes (consistently taking the insulin but misses oral medication)  Diabetes medication side effects?: No    Problem Solving:  Problem Solving Assessed Today: Yes  Is the patient at risk for hypoglycemia?: Yes  Hypoglycemia Frequency: Other (not recently)  Hypoglycemia: Feeling shaky       Megan Case RN  Time Spent: 60 minutes  Encounter Type: Individual    Any diabetes medication dose changes were made via the CDCES Standing Orders under the patient's referring provider.

## 2025-01-22 NOTE — TELEPHONE ENCOUNTER
Second attempt at trying to contact pt, but again, no answer.     VM left reminding pt of OV as scheduled below for tomorrow. Will close this encounter. BILL Cheng RN.

## 2025-01-22 NOTE — PATIENT INSTRUCTIONS
Care Plan:  Meal Plan Recommendation: eat 3 meals a day, have small snacks between meals, if needed, and use portion control.  Monitoring: scan your sensor when you wake up, before meals and bedtime.  -enter the insulin dose into notes. (the pencil at the top of the screen on the juan reader)    Medication:    Lantus: increase to 20 units every morning    Novolog/Humalog: use the follow scale:    Check your blood sugar before breakfast, lunch and dinner and take the following Novolog/Humalog dose:   Do Not give Correction Insulin if Pre-Meal BG less than 140.   For Pre-Meal  - 189 give 1 unit.   For Pre-Meal  - 239 give 2 units.   For Pre-Meal  - 289 give 3 units.   For Pre-Meal  - 339 give 4 units.   For Pre-Meal - 389 give 5 units.   For Pre-Meal -439 give 6 units   For Pre-Meal BG greater than or equal to 440 give 7 units.     Check your blood sugar before bed and take the following Novolog/Humalog dose:   Do Not give Bedtime Correction Insulin if BG less than  200.   For  - 249 give 1 units.   For  - 299 give 2 units.   For  - 349 give 3 units.   For  -399 give 4 units.   For BG greater than or equal to 400 give 5 units.     Hypoglycemia:  Low blood sugar is anything number below 80mg/dL.  Signs/symptoms of low blood sugar include (but are not limited to): sweating, shaking, feeling dizzy or weak, extreme hunger, headache, feeling crabby or confused, numbness or tingling of mouth/lips.  If you have these symptoms check your blood sugar if you can and then consume carbohydrate (sugar)  If your blood sugar is < 80 mg/dL, consume 15 grams of fast-acting carbohydrate. Wait 15 minutes. Check blood sugar again and if not rising, repeat.  Examples of 15 grams of carbohydrate:  - 4 glucose tabs   - 4 oz juice or non-diet pop   - 2 Tbsp dried fruit   - 5-7 lifesavers or hard candy that you can chew  - 1 Tbsp sugar or honey   If your blood sugar is < 50 mg/dL,  consume 30 grams of fast-acting carbohydrate (8 glucose tabs OR 8 oz juice or non-diet pop OR 4 Tbsp dried fruit OR 10-14 lifesavers OR 2 Tbsp sugar) and wait 15 minutes. Check blood sugar again and if not rising, repeat.    Please make sure to always keep a source of fast acting carbohydrate with you at all times (as listed above, but not limited to).  Place something in your car, at your bedside, in your purse/pocket etc.          Follow up:  Daquan evening, January 26th. Download your [employer] reader to [employer] and send a [employer] message to [place] that it is download, and include any insight or questions you have about your blood sugar readings.     Follow-up diabetes education appointment scheduled on February 5th at 1:00pm. We can discuss your insulin and travel at this appointment.      Bring blood glucose meter and logbook with you to all doctor and follow-up appointments.     [facility] for the [facility]:  For Your Diabetes or Nutrition Education Appointments Call:  [phone]   For Diabetes or Nutrition Related Questions:   [phone]  Send Qyuki Message   If you need a medication refill please contact your pharmacy. Please allow 3 business days for your refills to be completed.

## 2025-01-23 ENCOUNTER — LAB (OUTPATIENT)
Dept: LAB | Facility: CLINIC | Age: 65
End: 2025-01-23
Payer: MEDICARE

## 2025-01-23 ENCOUNTER — OFFICE VISIT (OUTPATIENT)
Dept: CARDIOLOGY | Facility: CLINIC | Age: 65
End: 2025-01-23
Attending: INTERNAL MEDICINE
Payer: MEDICARE

## 2025-01-23 VITALS
WEIGHT: 240 LBS | HEART RATE: 68 BPM | DIASTOLIC BLOOD PRESSURE: 70 MMHG | BODY MASS INDEX: 39.99 KG/M2 | HEIGHT: 65 IN | SYSTOLIC BLOOD PRESSURE: 144 MMHG

## 2025-01-23 DIAGNOSIS — R06.09 DOE (DYSPNEA ON EXERTION): ICD-10-CM

## 2025-01-23 DIAGNOSIS — E66.01 MORBID OBESITY (H): ICD-10-CM

## 2025-01-23 DIAGNOSIS — I25.10 CORONARY ARTERY DISEASE INVOLVING NATIVE CORONARY ARTERY OF NATIVE HEART WITHOUT ANGINA PECTORIS: ICD-10-CM

## 2025-01-23 DIAGNOSIS — Z79.4 TYPE 2 DIABETES MELLITUS WITH HYPEROSMOLARITY WITHOUT COMA, WITH LONG-TERM CURRENT USE OF INSULIN (H): ICD-10-CM

## 2025-01-23 DIAGNOSIS — I25.10 CORONARY ARTERY DISEASE INVOLVING NATIVE CORONARY ARTERY OF NATIVE HEART WITHOUT ANGINA PECTORIS: Primary | ICD-10-CM

## 2025-01-23 DIAGNOSIS — I25.118 CORONARY ARTERY DISEASE OF NATIVE ARTERY OF NATIVE HEART WITH STABLE ANGINA PECTORIS: ICD-10-CM

## 2025-01-23 DIAGNOSIS — E11.00 TYPE 2 DIABETES MELLITUS WITH HYPEROSMOLARITY WITHOUT COMA, WITH LONG-TERM CURRENT USE OF INSULIN (H): ICD-10-CM

## 2025-01-23 DIAGNOSIS — R06.02 SHORTNESS OF BREATH: ICD-10-CM

## 2025-01-23 LAB
ANION GAP SERPL CALCULATED.3IONS-SCNC: 12 MMOL/L (ref 7–15)
BUN SERPL-MCNC: 24.7 MG/DL (ref 8–23)
CALCIUM SERPL-MCNC: 9.2 MG/DL (ref 8.8–10.4)
CHLORIDE SERPL-SCNC: 94 MMOL/L (ref 98–107)
CREAT SERPL-MCNC: 1.59 MG/DL (ref 0.67–1.17)
EGFRCR SERPLBLD CKD-EPI 2021: 48 ML/MIN/1.73M2
ERYTHROCYTE [DISTWIDTH] IN BLOOD BY AUTOMATED COUNT: 12.9 % (ref 10–15)
GLUCOSE SERPL-MCNC: 249 MG/DL (ref 70–99)
HCO3 SERPL-SCNC: 23 MMOL/L (ref 22–29)
HCT VFR BLD AUTO: 27.3 % (ref 40–53)
HGB BLD-MCNC: 9.5 G/DL (ref 13.3–17.7)
MCH RBC QN AUTO: 29.1 PG (ref 26.5–33)
MCHC RBC AUTO-ENTMCNC: 34.8 G/DL (ref 31.5–36.5)
MCV RBC AUTO: 84 FL (ref 78–100)
PLATELET # BLD AUTO: 312 10E3/UL (ref 150–450)
POTASSIUM SERPL-SCNC: 4.9 MMOL/L (ref 3.4–5.3)
RBC # BLD AUTO: 3.26 10E6/UL (ref 4.4–5.9)
SODIUM SERPL-SCNC: 129 MMOL/L (ref 135–145)
WBC # BLD AUTO: 6.4 10E3/UL (ref 4–11)

## 2025-01-23 RX ORDER — ISOSORBIDE MONONITRATE 60 MG/1
60 TABLET, EXTENDED RELEASE ORAL DAILY
Qty: 90 TABLET | Refills: 3 | Status: SHIPPED | OUTPATIENT
Start: 2025-01-23

## 2025-01-23 RX ORDER — METOPROLOL TARTRATE 25 MG/1
12.5 TABLET, FILM COATED ORAL 2 TIMES DAILY
Qty: 90 TABLET | Refills: 3 | Status: SHIPPED | OUTPATIENT
Start: 2025-01-23

## 2025-01-23 RX ORDER — ROSUVASTATIN CALCIUM 40 MG/1
40 TABLET, COATED ORAL DAILY
Qty: 90 TABLET | Refills: 3 | Status: SHIPPED | OUTPATIENT
Start: 2025-01-23

## 2025-01-23 NOTE — PATIENT INSTRUCTIONS
We will get labs (CBC and BMP) today on your way out.     See Dr. Gallardo in 3 months with fasting labs a day before.   At that visit, he will let you know if you can proceed with the colonoscopy.

## 2025-01-23 NOTE — PROGRESS NOTES
CARDIOLOGY CLINIC PROGRESS NOTE    DOS: 2025      Steve Morgan  : 1960, 64 year old  MRN: [id number]      Primary cardiologist: Saw Dr. Brown, but will follow with Dr. Gallardo going forward       History: Steve Morgan is a 64 year old male with history of hypertension, hyperlipidemia, type 2 diabetes, hypothyroidism, CAD.     He was initially having chest pain on and off with exertion when he was outside.  He met with Dr. Brown and dobutamine echo was ordered.     He underwent a dobutamine stress echocardiogram 25. This was abnormal with anteroapical hypokinesis and prolonged chest pain.  He was sent to the emergency room for further evaluation and admitted.  Troponin in the emergency room was 78 and then increased to 86.  Subsequently coronary angiography revealed small caliber LAD with moderate ostial disease and possible spasm.  There was severe diffuse disease in the mid to distal LAD and diagonal branches.  Small caliber OM1 90% stenosis proximally.  Right posterior descending artery had 90% stenosis.  He was transferred to [facility] to consider revascularization with coronary artery bypass versus high risk PCI.     Felt to be not good candidate for CABG and was arranged for PCI.     25 s/p PCI to mid and distal RCA with good results. He does have diffuse LAD disease and small caliber LCX not amenable for PCI and thus will likely have chronic angina.      Discharged 1/15/25.       He presents today for follow up.   No chest pain.   Yesterday he walked around [employer], he had some belly discomfort, but none of his anginal sxs, which were substernal chest discomfort and some heat-like feeling into his face.   Met with cardiac rehab yesterday.   BP has been ok.   No bleeding concerns.             ROS:  Skin:  not assessed     Eyes:  not assessed    ENT:  not assessed    Respiratory:  Positive for dyspnea on exertion, sleep apnea, CPAP  Cardiovascular:     Positive for, fatigue, lightheadedness, dizziness  Gastroenterology: not assessed    Genitourinary:  not assessed    Musculoskeletal:  not assessed    Neurologic:  not assessed    Psychiatric:  not assessed    Heme/Lymph/Imm:  not assessed    Endocrine:  not assessed      PAST MEDICAL HISTORY:  Past Medical History:   Diagnosis Date    BPH (benign prostatic hyperplasia)     Cellulitis and abscess of trunk 06/27/2017    Depression     Depressive disorder     Diverticular disease of colon     GERD (gastroesophageal reflux disease)     Hyperlipidemia LDL goal <100 10/31/2010    Hypertension goal BP (blood pressure) < 140/90 03/17/2011    Hypothyroidism 01/12/2010    Morbid obesity due to excess calories (H) 01/12/2010    Neuropathy in diabetes (H) 01/12/2010    Obesity 01/12/2010    PVD (peripheral vascular disease)     Sleep apnea 01/12/2010    CPAP    Type 2 diabetes, HbA1C goal < 8% (H) 03/08/2011    Vitamin B12 deficiency (non anemic)        PAST SURGICAL HISTORY:  Past Surgical History:   Procedure Laterality Date    BIOPSY      BURSECTOMY KNEE Right 05/20/2024    Procedure: Excisional prepatellar bursectomy, right knee;  Surgeon: Justin Bo MD;  Location: [facility] OR    COLONOSCOPY      COSMETIC SURGERY      CV CORONARY ANGIOGRAM N/A 1/10/2025    Procedure: Coronary Angiogram;  Surgeon: Magan Gallardo MD;  Location:  [facility]ATH [facility]    CV INTRAVASULAR ULTRASOUND N/A 1/14/2025    Procedure: Intravascular Ultrasound;  Surgeon: Magan Gallardo MD;  Location:  [facility]ATH [facility]    CV PCI STENT DRUG ELUTING N/A 1/14/2025    Procedure: Percutaneous Coronary Intervention Stent;  Surgeon: Magan Gallardo MD;  Location:  HE[facility] CARD[facility] CATH LAB    EYE SURGERY Bilateral     Catracts    GENITOURINARY SURGERY      surg for undescended testicle    IRRIGATION AND DEBRIDEMENT HAND, COMBINED Right 12/23/2022    Procedure: Right long finger irrigation and debridement with partial  amputation of the right long finger. ;  Surgeon: Colby English MD;  Location: [facility] OR    REPAIR HAMMER TOE BILATERAL  05/16/2013    Procedure: REPAIR HAMMER TOE BILATERAL;  Flexor Tenotomy Toes 2,3,4,5 Bilateral Feet;  Surgeon: Saad Bangura DPM;  Location: [facility] OR       SOCIAL HISTORY:  Social History     Socioeconomic History    Marital status:      Spouse name: Sri    Number of children: 2    Years of education: None    Highest education level: Associate degree: occupational, technical, or vocational program   Occupational History    Occupation:      Employer: NONE      Comment: [employer]   Tobacco Use    Smoking status: Never    Smokeless tobacco: Never    Tobacco comments:     Smoked for about 6 months when I was 16 but haven't touched them since   Vaping Use    Vaping status: Never Used   Substance and Sexual Activity    Alcohol use: Yes     Comment: yearly    Drug use: Never    Sexual activity: Not Currently     Partners: Female     Birth control/protection: Abstinence   Other Topics Concern    Parent/sibling w/ CABG, MI or angioplasty before 65F 55M? Yes     Social Drivers of Health     Financial Resource Strain: Low Risk  (1/14/2025)    Financial Resource Strain     Within the past 12 months, have you or your family members you live with been unable to get utilities (heat, electricity) when it was really needed?: No   Food Insecurity: Low Risk  (1/14/2025)    Food Insecurity     Within the past 12 months, did you worry that your food would run out before you got money to buy more?: No     Within the past 12 months, did the food you bought just not last and you didn t have money to get more?: No   Transportation Needs: Low Risk  (1/14/2025)    Transportation Needs     Within the past 12 months, has lack of transportation kept you from medical appointments, getting your medicines, non-medical meetings or appointments, work, or from getting things that you need?: No   Physical  Activity: Inactive (7/5/2024)    Exercise Vital Sign     Days of Exercise per Week: 0 days     Minutes of Exercise per Session: 0 min   Stress: No Stress Concern Present (7/5/2024)    [employer] [place] [employer] - Occupational Stress Questionnaire     Feeling of Stress : Only a little   Social Connections: Socially Integrated (2/8/2024)    Received from [employer]  [facility], [employer]  [facility]    Social Connections     Frequency of Communication with Friends and Family: 0   Interpersonal Safety: Low Risk  (1/14/2025)    Interpersonal Safety     Do you feel physically and emotionally safe where you currently live?: Yes     Within the past 12 months, have you been hit, slapped, kicked or otherwise physically hurt by someone?: No     Within the past 12 months, have you been humiliated or emotionally abused in other ways by your partner or ex-partner?: No   Housing Stability: Low Risk  (1/14/2025)    Housing Stability     Do you have housing? : Yes     Are you worried about losing your housing?: No       FAMILY HISTORY:  Family History   Problem Relation Age of Onset    Breast Cancer Mother     Hypertension Mother     Thyroid Disease Mother     Depression Mother     Alzheimer Disease Mother 82    Obesity Mother     Cancer - colorectal Father     Thyroid Disease Father     Depression Father     Other - See Comments Father         bladder polyps    Prostate Cancer Father     Other Cancer Father     Diabetes Brother         Brother    Depression Brother     Diabetes Brother     Asthma Brother     Depression Brother     Anxiety Disorder Brother     Mental Illness Brother     Heart Disease Maternal Grandmother         CHF    Circulatory Paternal Grandmother     Cancer Paternal Grandfather     No Known Problems Daughter     No Known Problems Son     Diabetes Other         Great Aunt       MEDS:   Current Outpatient Medications   Medication Sig Dispense Refill     acetaminophen (TYLENOL) 325 MG tablet Take 2 tablets (650 mg) by mouth every 4 hours as needed for other (For optimal non-opioid multimodal pain management to improve pain control.)      amLODIPine (NORVASC) 5 MG tablet Take 1 tablet by mouth daily at 2 pm.      aspirin 81 MG EC tablet Take 1 tablet (81 mg) by mouth daily. 30 tablet 3    Calcium Carb-Cholecalciferol (CALCIUM 600 + D PO) Take 1 tablet by mouth daily      Continuous Glucose Sensor (FREESTYLE GIULIANA 2 SENSOR) MISC Inject 1 each subcutaneously every 14 days. Use 1 sensor every 14 days. Use to read blood sugars per 's instructions. 6 each 0    finasteride (PROSCAR) 5 MG tablet Take 1 tablet (5 mg) by mouth daily. 90 tablet 1    gabapentin (NEURONTIN) 300 MG capsule Take 1 capsule (300 mg) by mouth 3 times daily (Patient taking differently: Take 300 mg by mouth as needed.) 270 capsule 1    insulin aspart (NOVOLOG PEN) 100 UNIT/ML pen 4 times a day before meals and at bedtime. Sliding scale of 1:50> 150. Est TDD: 50 units/day 15 mL 1    insulin glargine 100 UNIT/ML pen Inject 17 Units subcutaneously daily. DOSE CHANGE 30 mL 2    insulin pen needle (B-D U/F) 31G X 5 MM miscellaneous USE 1 DAILY OR AS DIRECTED. 100 each 3    isosorbide mononitrate (IMDUR) 60 MG 24 hr tablet Take 1 tablet (60 mg) by mouth daily. DO NOT CRUSH. Can split tablet in half along score herve. 90 tablet 3    latanoprost (XALATAN) 0.005 % ophthalmic solution INSTILL 1 DROP INTO BOTH EYES IN THE EVENING      LEVOXYL 137 MCG tablet Take 1 tablet (137 mcg) by mouth daily. 90 tablet 3    metoprolol tartrate (LOPRESSOR) 25 MG tablet Take 0.5 tablets (12.5 mg) by mouth 2 times daily. 90 tablet 3    mirtazapine (REMERON) 45 MG tablet Take 1 tablet (45 mg) by mouth at bedtime. 90 tablet 0    MULTI-VITAMIN OR TABS Take 1 tablet by mouth daily 30 0    nitroGLYcerin (NITROSTAT) 0.4 MG sublingual tablet For chest pain place 1 tablet under the tongue every 5 minutes for 3 doses. If  "symptoms persist 5 minutes after 1st dose call 911. 30 tablet 11    prasugrel (EFFIENT) 10 MG TABS tablet Take 1 tablet (10 mg) by mouth daily. Do not crush or break tablet. Dose to start tomorrow. 90 tablet 3    rosuvastatin (CRESTOR) 40 MG tablet Take 1 tablet (40 mg) by mouth daily. 90 tablet 3    senna-docusate (SENOKOT-S/PERICOLACE) 8.6-50 MG tablet Take 1 tablet by mouth 2 times daily as needed for constipation 20 tablet 0    sodium bicarbonate 325 MG tablet Take 325 mg by mouth 2 times daily.      vilazodone (VIIBRYD) 20 MG TABS tablet Take 1 tablet (20 mg) by mouth daily. 30 tablet 1     No current facility-administered medications for this visit.       ALLERGIES: No Known Allergies    PHYSICAL EXAM:  Vitals: BP (!) 144/70 (BP Location: Right arm, Patient Position: Sitting)   Pulse 68   Ht 1.651 m (5' 5\")   Wt 108.9 kg (240 lb)   BMI 39.94 kg/m    Constitutional:  cooperative, alert and oriented, well developed, well nourished, in no acute distress morbidly obese      Skin:  warm and dry to the touch        Head:  no masses or lesions        Eyes:  pupils equal and round, conjunctivae and lids unremarkable, sclera white        ENT:           Neck:  JVP normal        Respiratory:  clear to auscultation        Cardiac: regular rhythm, normal S1 and S2, no murmurs, gallops or rubs detected   distant heart sounds              GI:  abdomen soft, BS normoactive obese      Vascular: pulses full and equal                                      Extremities and Musculoskeletal:  no edema   hands with finger deformities    Neurological:  no gross motor deficits, affect appropriate              LABS/DATA:  I reviewed the followin24 echo:  Interpretation Summary  The left ventricle is normal in structure, function and size.  The visual ejection fraction is 55-60%.  The right ventricle is normal in structure, function and size.  Small mobile echodensity noted on NCC. Cannot rule out vegetation.  TDS, IV " "attempted contrast not used due to TD IV access  IVC diameter <2.1 cm collapsing >50% with sniff suggests a normal RA pressure  of 3 mmHg.          1/9/25 dobutamine stress echo:  Interpretation Summary  Baseline ECG NSR with diffuse non specific T wave changes  Baseline echo shows normal EF. There is mild proximal septal thickening.  Doppler for a sub aortic gradient not performed at rest. The distal  septum/apex may be borderline hypokinetic at rest (which could be CAD or mild  hypokinesis if there were sub aortic/intra LV cavity gradient upstream from  the apex)  The patient exhibited hypertension at rest.  With dobutamine the T wave changes \"normalized\", patient became more  hypertensive.The mid LV cavity narrowed (doppler gradient was not performed)  and a small portion of the apex and apical lateral wall may have become more  hypokinetic than was seen at rest. The post-dobutamine echo pictures suggest  ongoing mild hypokinesis in these areas. Clincally the patient complain chest  discomfort despite the ECG looking better. The patient was given IV metoprolol  and the symptoms abated. This test could be suggestive of distal LAD or OM  disease vs apical distention from a possible mid cavity gradient. The patient  was sent to the ER for admission. If the patient gets a heart cath they may  wish to consider PVC stimulation to see if LV cavity gradient if there is no  significant CAD       1/10/25 Angio:  Conclusion  Extensive multivessel CAD as described below.  Unfortunately, majority of disease is distal vessel, with generally suboptimal graft targets.  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  The OM " branch has a 90% stenosis proximally.  RCA: This is the dominant vessel and is relatively healthy within the RCA itself other than a moderate focal mid RCA stenosis.  However, the RPDA has a 90% proximal stenosis with moderate diffuse disease elsewhere.  The RPL system is relatively healthy mild-moderate diffuse disease.        Recommend transfer to [facility] for multidisciplinary evaluation regarding revascularization (CABG versus PCI).  Given extensive coronary disease, CABG would typically be preferred, but unfortunately distal graft targets (particularly within the LAD) are very poor in this case.           1/14/25 Angio:  Conclusion  Successful HD-IVUS guided PCI from the distal RCA into the rPDA with placement of a single 2.75 x 38 mm Magnus Galt HALEY, post-dilated up to 4.0 mm within the RCA istself  Successful HD-IVUS guided PCI of the mid RCA with placement of a single 4.0 x 22 mm Magnus Galt HALEY, postdilated up to 4.5 mm.        Recommend medical management of remaining CAD at this time.  If residual angina unresponsive to medical therapy, could consider PCI of the LCx/OM at that time.            ASSESSMENT/PLAN:  # CAD  - 1/9/25 abnormal dobutamine stress echocardiogram  -- Cor angio 1/10/25: small caliber multivessel disease likely secondary to poor controlled diabetes mellitus  -- Felt to be not good candidate for CABG and was arranged for PCI   -- 1/14/25 s/p PCI to mid and distal RCA with good results. He does have diffuse LAD disease and small caliber LCX not amenable for PCI and thus will likely have chronic angina  -- Continue dual antiplatelet therapy with aspirin and Effient for 6 months, followed by indefinite P2Y12 inhibitor monotherapy   -- Continue Imdur 60 mg daily, amlodipine 5 mg daily, metoprolol 12.5 mg BID  -- Continue rouvastatin 40 mg, FLP/ALT in 6-8 weeks  -- Cardiac rehab        # Hyperlipidemia  - Statin regimen included simvastatin 20 mg daily with baseline  LDL of 130  -- Switched to rosuvastatin 40 mg on recent admission  -- FLP/ALT in 6-8 weeks       #Hypertension  - BP controlled on current regimen. Today elevated in clinic, but has not had meds yet today       # Longstanding history of T2DM with nephropathy  - hemoglobin A1c 7.2% in the setting of hemoglobin around 8 so A1c is likely higher       # Chronic anemia  - Stable between 8 and 9  - No reports of active bleeding       # CKD  - Stable        # Morbid obesity        # DAMIÁN        Follow up:  BMP and CBC today  Dr. Gallardo in April with FLP/ALT        Jacqueline Holloway PA-C      The longitudinal plan of care for the diagnosis(es)/condition(s) as documented were addressed during this visit. Due to the added complexity in care, I will continue to support Caio in the subsequent management and with ongoing continuity of care.

## 2025-01-23 NOTE — LETTER
2025    Physician No Ref-Primary  No address on file    RE: Steve Morgan       Dear Colleague,     I had the pleasure of seeing Steve Morgan in the [facility].      CARDIOLOGY CLINIC PROGRESS NOTE    DOS: 2025      Steve Morgan  : 1960, 64 year old  MRN: [id number]      Primary cardiologist: Saw Dr. Brown, but will follow with Dr. Gallardo going forward       History: Steve Morgan is a 64 year old male with history of hypertension, hyperlipidemia, type 2 diabetes, hypothyroidism, CAD.     He was initially having chest pain on and off with exertion when he was outside.  He met with Dr. Brown and dobutamine echo was ordered.     He underwent a dobutamine stress echocardiogram 25. This was abnormal with anteroapical hypokinesis and prolonged chest pain.  He was sent to the emergency room for further evaluation and admitted.  Troponin in the emergency room was 78 and then increased to 86.  Subsequently coronary angiography revealed small caliber LAD with moderate ostial disease and possible spasm.  There was severe diffuse disease in the mid to distal LAD and diagonal branches.  Small caliber OM1 90% stenosis proximally.  Right posterior descending artery had 90% stenosis.  He was transferred to [facility] to consider revascularization with coronary artery bypass versus high risk PCI.     Felt to be not good candidate for CABG and was arranged for PCI.     25 s/p PCI to mid and distal RCA with good results. He does have diffuse LAD disease and small caliber LCX not amenable for PCI and thus will likely have chronic angina.      Discharged 1/15/25.       He presents today for follow up.   No chest pain.   Yesterday he walked around [employer], he had some belly discomfort, but none of his anginal sxs, which were substernal chest discomfort and some heat-like feeling into his face.   Met with cardiac rehab yesterday.   BP has been ok.   No  bleeding concerns.             ROS:  Skin:  not assessed     Eyes:  not assessed    ENT:  not assessed    Respiratory:  Positive for dyspnea on exertion, sleep apnea, CPAP  Cardiovascular:    Positive for, fatigue, lightheadedness, dizziness  Gastroenterology: not assessed    Genitourinary:  not assessed    Musculoskeletal:  not assessed    Neurologic:  not assessed    Psychiatric:  not assessed    Heme/Lymph/Imm:  not assessed    Endocrine:  not assessed      PAST MEDICAL HISTORY:  Past Medical History:   Diagnosis Date     BPH (benign prostatic hyperplasia)      Cellulitis and abscess of trunk 06/27/2017     Depression      Depressive disorder      Diverticular disease of colon      GERD (gastroesophageal reflux disease)      Hyperlipidemia LDL goal <100 10/31/2010     Hypertension goal BP (blood pressure) < 140/90 03/17/2011     Hypothyroidism 01/12/2010     Morbid obesity due to excess calories (H) 01/12/2010     Neuropathy in diabetes (H) 01/12/2010     Obesity 01/12/2010     PVD (peripheral vascular disease)      Sleep apnea 01/12/2010    CPAP     Type 2 diabetes, HbA1C goal < 8% (H) 03/08/2011     Vitamin B12 deficiency (non anemic)        PAST SURGICAL HISTORY:  Past Surgical History:   Procedure Laterality Date     BIOPSY       BURSECTOMY KNEE Right 05/20/2024    Procedure: Excisional prepatellar bursectomy, right knee;  Surgeon: Justin Bo MD;  Location: [facility] OR     COLONOSCOPY       COSMETIC SURGERY       CV CORONARY ANGIOGRAM N/A 1/10/2025    Procedure: Coronary Angiogram;  Surgeon: Magan Gallardo MD;  Location:  HEART CARDIAC CATH LAB     CV INTRAVASULAR ULTRASOUND N/A 1/14/2025    Procedure: Intravascular Ultrasound;  Surgeon: Magan Gallardo MD;  Location: [facility] CARDIAC CATH LAB     CV PCI STENT DRUG ELUTING N/A 1/14/2025    Procedure: Percutaneous Coronary Intervention Stent;  Surgeon: Magan Gallardo MD;  Location: [facility] CARDIAC CATH LAB     EYE SURGERY  Bilateral     Catracts     GENITOURINARY SURGERY      surg for undescended testicle     IRRIGATION AND DEBRIDEMENT HAND, COMBINED Right 12/23/2022    Procedure: Right long finger irrigation and debridement with partial amputation of the right long finger. ;  Surgeon: Colby English MD;  Location: [facility] OR     REPAIR HAMMER TOE BILATERAL  05/16/2013    Procedure: REPAIR HAMMER TOE BILATERAL;  Flexor Tenotomy Toes 2,3,4,5 Bilateral Feet;  Surgeon: Saad Bangura DPM;  Location: [facility] OR       SOCIAL HISTORY:  Social History     Socioeconomic History     Marital status:      Spouse name: Sri     Number of children: 2     Years of education: None     Highest education level: Associate degree: occupational, technical, or vocational program   Occupational History     Occupation:      Employer: NONE      Comment: [employer]   Tobacco Use     Smoking status: Never     Smokeless tobacco: Never     Tobacco comments:     Smoked for about 6 months when I was 16 but haven't touched them since   Vaping Use     Vaping status: Never Used   Substance and Sexual Activity     Alcohol use: Yes     Comment: yearly     Drug use: Never     Sexual activity: Not Currently     Partners: Female     Birth control/protection: Abstinence   Other Topics Concern     Parent/sibling w/ CABG, MI or angioplasty before 65F 55M? Yes     Social Drivers of Health     Financial Resource Strain: Low Risk  (1/14/2025)    Financial Resource Strain      Within the past 12 months, have you or your family members you live with been unable to get utilities (heat, electricity) when it was really needed?: No   Food Insecurity: Low Risk  (1/14/2025)    Food Insecurity      Within the past 12 months, did you worry that your food would run out before you got money to buy more?: No      Within the past 12 months, did the food you bought just not last and you didn t have money to get more?: No   Transportation Needs: Low Risk  (1/14/2025)     Transportation Needs      Within the past 12 months, has lack of transportation kept you from medical appointments, getting your medicines, non-medical meetings or appointments, work, or from getting things that you need?: No   Physical Activity: Inactive (7/5/2024)    Exercise Vital Sign      Days of Exercise per Week: 0 days      Minutes of Exercise per Session: 0 min   Stress: No Stress Concern Present (7/5/2024)    [facility] [place] [employer] - Occupational Stress Questionnaire      Feeling of Stress : Only a little   Social Connections: Socially Integrated (2/8/2024)    Received from [employer], [email or web address]    Social Connections      Frequency of Communication with Friends and Family: 0   Interpersonal Safety: Low Risk  (1/14/2025)    Interpersonal Safety      Do you feel physically and emotionally safe where you currently live?: Yes      Within the past 12 months, have you been hit, slapped, kicked or otherwise physically hurt by someone?: No      Within the past 12 months, have you been humiliated or emotionally abused in other ways by your partner or ex-partner?: No   Housing Stability: Low Risk  (1/14/2025)    Housing Stability      Do you have housing? : Yes      Are you worried about losing your housing?: No       FAMILY HISTORY:  Family History   Problem Relation Age of Onset     Breast Cancer Mother      Hypertension Mother      Thyroid Disease Mother      Depression Mother      Alzheimer Disease Mother 82     Obesity Mother      Cancer - colorectal Father      Thyroid Disease Father      Depression Father      Other - See Comments Father         bladder polyps     Prostate Cancer Father      Other Cancer Father      Diabetes Brother         Brother     Depression Brother      Diabetes Brother      Asthma Brother      Depression Brother      Anxiety Disorder Brother      Mental Illness Brother      Heart Disease Maternal  Grandmother         CHF     Circulatory Paternal Grandmother      Cancer Paternal Grandfather      No Known Problems Daughter      No Known Problems Son      Diabetes Other         Great Aunt       MEDS:   Current Outpatient Medications   Medication Sig Dispense Refill     acetaminophen (TYLENOL) 325 MG tablet Take 2 tablets (650 mg) by mouth every 4 hours as needed for other (For optimal non-opioid multimodal pain management to improve pain control.)       amLODIPine (NORVASC) 5 MG tablet Take 1 tablet by mouth daily at 2 pm.       aspirin 81 MG EC tablet Take 1 tablet (81 mg) by mouth daily. 30 tablet 3     Calcium Carb-Cholecalciferol (CALCIUM 600 + D PO) Take 1 tablet by mouth daily       Continuous Glucose Sensor (FREESTYLE GIULIANA 2 SENSOR) MISC Inject 1 each subcutaneously every 14 days. Use 1 sensor every 14 days. Use to read blood sugars per 's instructions. 6 each 0     finasteride (PROSCAR) 5 MG tablet Take 1 tablet (5 mg) by mouth daily. 90 tablet 1     gabapentin (NEURONTIN) 300 MG capsule Take 1 capsule (300 mg) by mouth 3 times daily (Patient taking differently: Take 300 mg by mouth as needed.) 270 capsule 1     insulin aspart (NOVOLOG PEN) 100 UNIT/ML pen 4 times a day before meals and at bedtime. Sliding scale of 1:50> 150. Est TDD: 50 units/day 15 mL 1     insulin glargine 100 UNIT/ML pen Inject 17 Units subcutaneously daily. DOSE CHANGE 30 mL 2     insulin pen needle (B-D U/F) 31G X 5 MM miscellaneous USE 1 DAILY OR AS DIRECTED. 100 each 3     isosorbide mononitrate (IMDUR) 60 MG 24 hr tablet Take 1 tablet (60 mg) by mouth daily. DO NOT CRUSH. Can split tablet in half along score herve. 90 tablet 3     latanoprost (XALATAN) 0.005 % ophthalmic solution INSTILL 1 DROP INTO BOTH EYES IN THE EVENING       LEVOXYL 137 MCG tablet Take 1 tablet (137 mcg) by mouth daily. 90 tablet 3     metoprolol tartrate (LOPRESSOR) 25 MG tablet Take 0.5 tablets (12.5 mg) by mouth 2 times daily. 90 tablet 3      "mirtazapine (REMERON) 45 MG tablet Take 1 tablet (45 mg) by mouth at bedtime. 90 tablet 0     MULTI-VITAMIN OR TABS Take 1 tablet by mouth daily 30 0     nitroGLYcerin (NITROSTAT) 0.4 MG sublingual tablet For chest pain place 1 tablet under the tongue every 5 minutes for 3 doses. If symptoms persist 5 minutes after 1st dose call 911. 30 tablet 11     prasugrel (EFFIENT) 10 MG TABS tablet Take 1 tablet (10 mg) by mouth daily. Do not crush or break tablet. Dose to start tomorrow. 90 tablet 3     rosuvastatin (CRESTOR) 40 MG tablet Take 1 tablet (40 mg) by mouth daily. 90 tablet 3     senna-docusate (SENOKOT-S/PERICOLACE) 8.6-50 MG tablet Take 1 tablet by mouth 2 times daily as needed for constipation 20 tablet 0     sodium bicarbonate 325 MG tablet Take 325 mg by mouth 2 times daily.       vilazodone (VIIBRYD) 20 MG TABS tablet Take 1 tablet (20 mg) by mouth daily. 30 tablet 1     No current facility-administered medications for this visit.       ALLERGIES: No Known Allergies    PHYSICAL EXAM:  Vitals: BP (!) 144/70 (BP Location: Right arm, Patient Position: Sitting)   Pulse 68   Ht 1.651 m (5' 5\")   Wt 108.9 kg (240 lb)   BMI 39.94 kg/m    Constitutional:  cooperative, alert and oriented, well developed, well nourished, in no acute distress morbidly obese      Skin:  warm and dry to the touch        Head:  no masses or lesions        Eyes:  pupils equal and round, conjunctivae and lids unremarkable, sclera white        ENT:           Neck:  JVP normal        Respiratory:  clear to auscultation        Cardiac: regular rhythm, normal S1 and S2, no murmurs, gallops or rubs detected   distant heart sounds              GI:  abdomen soft, BS normoactive obese      Vascular: pulses full and equal                                      Extremities and Musculoskeletal:  no edema   hands with finger deformities    Neurological:  no gross motor deficits, affect appropriate              LABS/DATA:  I reviewed the " "followin24 echo:  Interpretation Summary  The left ventricle is normal in structure, function and size.  The visual ejection fraction is 55-60%.  The right ventricle is normal in structure, function and size.  Small mobile echodensity noted on NCC. Cannot rule out vegetation.  TDS, IV attempted contrast not used due to TD IV access  IVC diameter <2.1 cm collapsing >50% with sniff suggests a normal RA pressure  of 3 mmHg.          25 dobutamine stress echo:  Interpretation Summary  Baseline ECG NSR with diffuse non specific T wave changes  Baseline echo shows normal EF. There is mild proximal septal thickening.  Doppler for a sub aortic gradient not performed at rest. The distal  septum/apex may be borderline hypokinetic at rest (which could be CAD or mild  hypokinesis if there were sub aortic/intra LV cavity gradient upstream from  the apex)  The patient exhibited hypertension at rest.  With dobutamine the T wave changes \"normalized\", patient became more  hypertensive.The mid LV cavity narrowed (doppler gradient was not performed)  and a small portion of the apex and apical lateral wall may have become more  hypokinetic than was seen at rest. The post-dobutamine echo pictures suggest  ongoing mild hypokinesis in these areas. Clincally the patient complain chest  discomfort despite the ECG looking better. The patient was given IV metoprolol  and the symptoms abated. This test could be suggestive of distal LAD or OM  disease vs apical distention from a possible mid cavity gradient. The patient  was sent to the ER for admission. If the patient gets a heart cath they may  wish to consider PVC stimulation to see if LV cavity gradient if there is no  significant CAD       1/10/25 Angio:  Conclusion  Extensive multivessel CAD as described below.  Unfortunately, majority of disease is distal vessel, with generally suboptimal graft targets.  LM: No LM; LAD and LCx arise from separate aortic ostia  LAD: Small " caliber vessel with moderate ostial disease (potentially exacerbated by catheter induced spasm), with severe diffuse coronary disease throughout the mid-distal LAD, diagonal branches, and septal branches  LCx: Small caliber vessel with only a single moderate-sized OM branch (1.5-2.0 mm).  The OM branch has a 90% stenosis proximally.  RCA: This is the dominant vessel and is relatively healthy within the RCA itself other than a moderate focal mid RCA stenosis.  However, the RPDA has a 90% proximal stenosis with moderate diffuse disease elsewhere.  The RPL system is relatively healthy mild-moderate diffuse disease.        Recommend transfer to [facility] for multidisciplinary evaluation regarding revascularization (CABG versus PCI).  Given extensive coronary disease, CABG would typically be preferred, but unfortunately distal graft targets (particularly within the LAD) are very poor in this case.           1/14/25 Angio:  Conclusion  Successful HD-IVUS guided PCI from the distal RCA into the rPDA with placement of a single 2.75 x 38 mm Magnus Vandervoort HALEY, post-dilated up to 4.0 mm within the RCA istself  Successful HD-IVUS guided PCI of the mid RCA with placement of a single 4.0 x 22 mm Magnus Vandervoort HALEY, postdilated up to 4.5 mm.        Recommend medical management of remaining CAD at this time.  If residual angina unresponsive to medical therapy, could consider PCI of the LCx/OM at that time.            ASSESSMENT/PLAN:  # CAD  - 1/9/25 abnormal dobutamine stress echocardiogram  -- Cor angio 1/10/25: small caliber multivessel disease likely secondary to poor controlled diabetes mellitus  -- Felt to be not good candidate for CABG and was arranged for PCI   -- 1/14/25 s/p PCI to mid and distal RCA with good results. He does have diffuse LAD disease and small caliber LCX not amenable for PCI and thus will likely have chronic angina  -- Continue dual antiplatelet therapy with aspirin and Effient for 6  months, followed by indefinite P2Y12 inhibitor monotherapy   -- Continue Imdur 60 mg daily, amlodipine 5 mg daily, metoprolol 12.5 mg BID  -- Continue rouvastatin 40 mg, FLP/ALT in 6-8 weeks  -- Cardiac rehab        # Hyperlipidemia  - Statin regimen included simvastatin 20 mg daily with baseline LDL of 130  -- Switched to rosuvastatin 40 mg on recent admission  -- FLP/ALT in 6-8 weeks       #Hypertension  - BP controlled on current regimen. Today elevated in clinic, but has not had meds yet today       # Longstanding history of T2DM with nephropathy  - hemoglobin A1c 7.2% in the setting of hemoglobin around 8 so A1c is likely higher       # Chronic anemia  - Stable between 8 and 9  - No reports of active bleeding       # CKD  - Stable        # Morbid obesity        # DAMIÁN        Follow up:  BMP and CBC today  Dr. Gallardo in April with FLP/ALT        Jacqueline Holloway PA-C      The longitudinal plan of care for the diagnosis(es)/condition(s) as documented were addressed during this visit. Due to the added complexity in care, I will continue to support Caio in the subsequent management and with ongoing continuity of care.      Thank you for allowing me to participate in the care of your patient.      Sincerely,     Jacqueline Holloway PA-C     [facility]  cc:   Demar Brown MD  [id number] [facility]  [place] [id number]

## 2025-01-24 ENCOUNTER — HOSPITAL ENCOUNTER (OUTPATIENT)
Dept: CARDIAC REHAB | Facility: CLINIC | Age: 65
Discharge: HOME | End: 2025-01-24
Attending: STUDENT IN AN ORGANIZED HEALTH CARE EDUCATION/TRAINING PROGRAM
Payer: MEDICARE

## 2025-01-24 PROCEDURE — 93798 PHYS/QHP OP CAR RHAB W/ECG: CPT

## 2025-01-27 ENCOUNTER — TRANSFERRED RECORDS (OUTPATIENT)
Dept: HEALTH INFORMATION MANAGEMENT | Facility: CLINIC | Age: 65
End: 2025-01-27

## 2025-01-27 NOTE — TELEPHONE ENCOUNTER
Diabetes Follow-up    Subjective/Objective:    Steve Morgan requests review of glucose and monitoring report.   Comments/concerns: see mychart    Diabetes is being managed with Diabetes Medications   Diabetes Medication(s)       Insulin       insulin aspart (NOVOLOG PEN) 100 UNIT/ML pen 4 times a day before meals and at bedtime. Sliding scale of 1:50> 150. Est TDD: 50 units/day     insulin glargine 100 UNIT/ML pen Inject 17 Units subcutaneously daily. DOSE CHANGE            Report:         Assessment/Plan:  Glucose consistently above goal, recommend 20% increase to basal insulin, consistently take the Novolog/Humalog before the meal and take dose listed, and increase meal dose by 1 units at each dosing.     Medication:    Lantus: increase to 24 units every morning    Novolog/Humalog: use the follow scale:     Check your blood sugar before breakfast, lunch and dinner and take the following Novolog/Humalog dose:   Do Not give Correction Insulin if Pre-Meal BG less than 140.   For Pre-Meal  - 189 give 2 unit.   For Pre-Meal  - 239 give 3 units.   For Pre-Meal  - 289 give 4 units.   For Pre-Meal  - 339 give 5 units.   For Pre-Meal - 389 give 6 units.   For Pre-Meal -439 give 7 units   For Pre-Meal BG greater than or equal to 440 give 8 units.      Check your blood sugar before bed and take the following Novolog/Humalog dose:   Do Not give Bedtime Correction Insulin if BG less than  200.   For  - 249 give 1 units.   For  - 299 give 2 units.   For  - 349 give 3 units.   For  -399 give 4 units.   For BG greater than or equal to 400 give 5 units.       Megan LOWEN, RN, PHN, CDCES     Any diabetes medication dose changes were made via the CDE Protocol and Collaborative Practice Agreement with the patient's referring provider. A copy of this encounter was shared with the provider.

## 2025-01-29 ENCOUNTER — HOSPITAL ENCOUNTER (OUTPATIENT)
Dept: CARDIAC REHAB | Facility: CLINIC | Age: 65
Discharge: HOME | End: 2025-01-29
Attending: STUDENT IN AN ORGANIZED HEALTH CARE EDUCATION/TRAINING PROGRAM
Payer: MEDICARE

## 2025-01-29 ENCOUNTER — VIRTUAL VISIT (OUTPATIENT)
Dept: ENDOCRINOLOGY | Facility: CLINIC | Age: 65
End: 2025-01-29
Payer: MEDICARE

## 2025-01-29 DIAGNOSIS — Z79.4 TYPE 2 DIABETES MELLITUS WITH DIABETIC NEUROPATHY, WITH LONG-TERM CURRENT USE OF INSULIN (H): Primary | ICD-10-CM

## 2025-01-29 DIAGNOSIS — E11.40 TYPE 2 DIABETES MELLITUS WITH DIABETIC NEUROPATHY, WITH LONG-TERM CURRENT USE OF INSULIN (H): Primary | ICD-10-CM

## 2025-01-29 PROCEDURE — 93798 PHYS/QHP OP CAR RHAB W/ECG: CPT

## 2025-01-29 PROCEDURE — 93797 PHYS/QHP OP CAR RHAB WO ECG: CPT

## 2025-01-29 ASSESSMENT — PAIN SCALES - GENERAL: PAINLEVEL_OUTOF10: NO PAIN (0)

## 2025-01-29 NOTE — PROGRESS NOTES
Assessment/Plan :   Type 2 DM. We need to get Caio's blood sugars under better control. He is taking Lantus every morning and using the Novolog as needed. We discussed the role of insulin in managing blood sugars. We also discussed how the metformin and glipizide were helping to stabilize numbers, as well. He clearly has some insulin resistance and we need to get him back on a medication that can help improve his overall resistance. His kidney function has improved but I would still like to hold off on restarting metformin. Instead I will send in a new prescription for Trulicity 0.75 mg weekly. I am hopeful that his new prescription coverage will cover Trulicity. Lastly, we discussed how to adjust Lantus each morning based on his blood sugars. He will increase the dose by 2 unit(s) every morning until his blood sugars are under 200 mg/dl. He will follow-up with our CDE team in about 1 wk.     Due to the COVID 19 pandemic this visit was a telephone/video visit in order to help prevent spread of infection in this patient and the general population. The patient gave verbal consent for the visit today. I have independently reviewed and interpreted labs, imaging as indicated.       Distant Location (provider location):  Off-site  Mode of Communication:  Video Conference via [employer]  Chart review/prep time 5    Joined the call at 1/29/2025, 12:57:59 pm.  Left the call at 1/29/2025, 1:14:13 pm.  You were on the call for 16 minutes 13 seconds .  25 minutes spent on the date of the encounter doing chart review, history and exam, documentation and further activities as noted above.      Chief complaint:  Steve is a 64 year old male who returns for follow-up of Type 2 DM.    I have reviewed Care Everywhere including [facility], [facility], [facility], [facility], [place], [facility], [facility] , [facility], [place] lab reports, imaging reports and provider notes as indicated.      HISTORY OF PRESENT ILLNESS  Caio  continues to struggle with high blood sugars. He has met with the diabetic educator and the Lantus has been increased to 24 unit(s) daily. He has also been taking Novolog throughout the day based on a sliding scale. He just doesn't understand what changed, since getting home from the hospital. He is monitoring his blood sugars closely with the FreeStyle Nina 2 sensor and his numbers are pretty consistently over 200 mg/dl. He has not had any issues with low blood sugars.    Caio was diagnosed with diabetes about 20 yrs ago. He states that he had been told that he had pre-diabetes before being officially diagnosed. He was previously taking metformin 2000 mg daily, glipizide 10 mg daily and Lantus 40 unit(s) at night. This seemed to work well. On January 9th, he underwent an echocardiogram due to ongoing exertional chest pain. The stress test was abnormal and he developed worsening hypertension. He was then admitted for angiogram and later underwent PCI to mid and distal RCA. During the hospitalization there was concern regarding worsening kidney function, so both the metformin and glipizide were discontinued. He was discharged on MDI therapy.    Caio understands the importance of getting his blood sugars under better control. He states that he has been feeling pretty good, since the hospitalization but he is worried about his blood sugars. He has been trying to make healthy dietary decisions but admits he has a bit of a sweet tooth. He also recently started cardiac rehab. He has not had any problems with blurred vision or an increase in numbness/tingling in his feet. He also has not had any issues with injection site reactions.     Endocrine relevant labs are as follows:   Latest Reference Range & Units 01/06/25 11:39   Hemoglobin A1C 0.0 - 5.6 % 7.2 (H)   (H): Data is abnormally high   Latest Reference Range & Units 10/21/24 13:20   Hemoglobin A1C 0.0 - 5.6 % 7.9 (H)   (H): Data is abnormally high   Latest  Reference Range & Units 05/16/24 10:32   Albumin Urine mg/g Cr 0.00 - 17.00 mg/g Cr 151.99 (H)   (H): Data is abnormally high   Latest Reference Range & Units 05/16/24 10:32   Albumin Urine mg/L mg/L 57.3     REVIEW OF SYSTEMS    Endocrine: positive for diabetes  Skin: negative  Eyes: negative for, visual blurring, redness, tearing  Ears/Nose/Throat: negative  Respiratory: No shortness of breath, dyspnea on exertion, cough, or hemoptysis  Cardiovascular: positive for exercise intolerance/he recently started cardiac rehab, negative for, chest pain, dyspnea on exertion, and lower extremity edema  Gastrointestinal: negative for, nausea, vomiting, constipation, and diarrhea  Genitourinary: negative for, nocturia, dysuria, frequency, and urgency  Musculoskeletal: negative for, muscular weakness, nocturnal cramping, and foot pain  Neurologic: negative for, local weakness, numbness or tingling of hands, and numbness or tingling of feet  Psychiatric: negative  Hematologic/Lymphatic/Immunologic: negative    Past Medical History  Past Medical History:   Diagnosis Date    BPH (benign prostatic hyperplasia)     Cellulitis and abscess of trunk 06/27/2017    Depression     Depressive disorder     Diverticular disease of colon     GERD (gastroesophageal reflux disease)     Hyperlipidemia LDL goal <100 10/31/2010    Hypertension goal BP (blood pressure) < 140/90 03/17/2011    Hypothyroidism 01/12/2010    Morbid obesity due to excess calories (H) 01/12/2010    Neuropathy in diabetes (H) 01/12/2010    Obesity 01/12/2010    PVD (peripheral vascular disease)     Sleep apnea 01/12/2010    CPAP    Type 2 diabetes, HbA1C goal < 8% (H) 03/08/2011    Vitamin B12 deficiency (non anemic)        Medications  Current Outpatient Medications   Medication Sig Dispense Refill    acetaminophen (TYLENOL) 325 MG tablet Take 2 tablets (650 mg) by mouth every 4 hours as needed for other (For optimal non-opioid multimodal pain management to improve pain  control.)      amLODIPine (NORVASC) 5 MG tablet Take 1 tablet by mouth daily at 2 pm.      aspirin 81 MG EC tablet Take 1 tablet (81 mg) by mouth daily. 30 tablet 3    Calcium Carb-Cholecalciferol (CALCIUM 600 + D PO) Take 1 tablet by mouth daily      Continuous Glucose Sensor (FREESTYLE GIULIANA 2 SENSOR) MISC Inject 1 each subcutaneously every 14 days. Use 1 sensor every 14 days. Use to read blood sugars per 's instructions. 6 each 0    finasteride (PROSCAR) 5 MG tablet Take 1 tablet (5 mg) by mouth daily. 90 tablet 1    gabapentin (NEURONTIN) 300 MG capsule Take 1 capsule (300 mg) by mouth 3 times daily (Patient taking differently: Take 300 mg by mouth as needed.) 270 capsule 1    insulin aspart (NOVOLOG PEN) 100 UNIT/ML pen 4 times a day before meals and at bedtime. Sliding scale of 1:50> 150. Est TDD: 50 units/day 15 mL 1    insulin glargine 100 UNIT/ML pen Inject 17 Units subcutaneously daily. DOSE CHANGE 30 mL 2    insulin pen needle (B-D U/F) 31G X 5 MM miscellaneous USE 1 DAILY OR AS DIRECTED. 100 each 3    isosorbide mononitrate (IMDUR) 60 MG 24 hr tablet Take 1 tablet (60 mg) by mouth daily. DO NOT CRUSH. Can split tablet in half along score herve. 90 tablet 3    latanoprost (XALATAN) 0.005 % ophthalmic solution INSTILL 1 DROP INTO BOTH EYES IN THE EVENING      LEVOXYL 137 MCG tablet Take 1 tablet (137 mcg) by mouth daily. 90 tablet 3    metoprolol tartrate (LOPRESSOR) 25 MG tablet Take 0.5 tablets (12.5 mg) by mouth 2 times daily. 90 tablet 3    mirtazapine (REMERON) 45 MG tablet Take 1 tablet (45 mg) by mouth at bedtime. 90 tablet 0    MULTI-VITAMIN OR TABS Take 1 tablet by mouth daily 30 0    nitroGLYcerin (NITROSTAT) 0.4 MG sublingual tablet For chest pain place 1 tablet under the tongue every 5 minutes for 3 doses. If symptoms persist 5 minutes after 1st dose call 911. 30 tablet 11    prasugrel (EFFIENT) 10 MG TABS tablet Take 1 tablet (10 mg) by mouth daily. Do not crush or break tablet.  Dose to start tomorrow. 90 tablet 3    rosuvastatin (CRESTOR) 40 MG tablet Take 1 tablet (40 mg) by mouth daily. 90 tablet 3    senna-docusate (SENOKOT-S/PERICOLACE) 8.6-50 MG tablet Take 1 tablet by mouth 2 times daily as needed for constipation 20 tablet 0    sodium bicarbonate 325 MG tablet Take 325 mg by mouth 2 times daily.      vilazodone (VIIBRYD) 20 MG TABS tablet Take 1 tablet (20 mg) by mouth daily. 30 tablet 1       Allergies  No Known Allergies    Family History  family history includes Alzheimer Disease (age of onset: 82) in his mother; Anxiety Disorder in his brother; Asthma in his brother; Breast Cancer in his mother; Cancer in his paternal grandfather; Cancer - colorectal in his father; Circulatory in his paternal grandmother; Depression in his brother, brother, father, and mother; Diabetes in his brother, brother and another family member; Heart Disease in his maternal grandmother; Hypertension in his mother; Mental Illness in his brother; No Known Problems in his daughter and son; Obesity in his mother; Other - See Comments in his father; Other Cancer in his father; Prostate Cancer in his father; Thyroid Disease in his father and mother.    Social History  Social History     Tobacco Use    Smoking status: Never    Smokeless tobacco: Never    Tobacco comments:     Smoked for about 6 months when I was 16 but haven't touched them since   Vaping Use    Vaping status: Never Used   Substance Use Topics    Alcohol use: Yes     Comment: yearly    Drug use: Never       Physical Exam  There is no height or weight on file to calculate BMI.  GENERAL: no distress  SKIN: Visible skin clear. No significant rash, abnormal pigmentation or lesions.  EYES: Eyes grossly normal to inspection.  No discharge or erythema, or obvious scleral/conjunctival abnormalities.  NECK: visible goiter is not present; no visible neck masses  RESP: No audible wheeze, cough, or visible cyanosis.  No visible retractions or increased work  of breathing.    NEURO: Awake, alert, responds appropriately to questions.  Mentation and speech fluent.  PSYCH:affect normal and appearance well-groomed.      DATA REVIEW  FreeStyle LibreView  Time in target range 4%  High 17%, Very High 79%  Current Ave  mg/dl

## 2025-01-29 NOTE — LETTER
1/29/2025      Steve Morgan  [address] [place] [address]      Dear Colleague,    Thank you for referring your patient, Steve Morgan, to the [facility]. Please see a copy of my visit note below.    Outcome for 01/17/25 9:57 AM: [employer] message sent  Carmelita Mendoza MA  Outcome for 01/27/25 1:31 PM: Data obtained via [employer] website  Carmelita Mendoza MA    Patient is showing 4/5 MNCM met. BP out range   Carmelita Mendoza MA              Assessment/Plan :   Type 2 DM. We need to get Caio's blood sugars under better control. He is taking Lantus every morning and using the Novolog as needed. We discussed the role of insulin in managing blood sugars. We also discussed how the metformin and glipizide were helping to stabilize numbers, as well. He clearly has some insulin resistance and we need to get him back on a medication that can help improve his overall resistance. His kidney function has improved but I would still like to hold off on restarting metformin. Instead I will send in a new prescription for Trulicity 0.75 mg weekly. I am hopeful that his new prescription coverage will cover Trulicity. Lastly, we discussed how to adjust Lantus each morning based on his blood sugars. He will increase the dose by 2 unit(s) every morning until his blood sugars are under 200 mg/dl. He will follow-up with our CDE team in about 1 wk.     Due to the COVID 19 pandemic this visit was a telephone/video visit in order to help prevent spread of infection in this patient and the general population. The patient gave verbal consent for the visit today. I have independently reviewed and interpreted labs, imaging as indicated.     {PROVIDER LOCATION On-site should be selected for visits conducted from your clinic location or adjoining [facility] hospital, academic office, or other nearby [facility] building. Off-site should be selected for all other provider locations, including home:[id number]}  Distant  Location (provider location):  Off-site  Mode of Communication:  Video Conference via [employer]  Chart review/prep time 5    Joined the call at 1/29/2025, 12:57:59 pm.  Left the call at 1/29/2025, 1:14:13 pm.  You were on the call for 16 minutes 13 seconds .  25 minutes spent on the date of the encounter doing chart review, history and exam, documentation and further activities as noted above.      Chief complaint:  Steve is a 64 year old male who returns for follow-up of Type 2 DM.    I have reviewed Care Everywhere including [facility], [facility], [facility], [facility], [place], [facility], [facility] , [facility], [place] lab reports, imaging reports and provider notes as indicated.      HISTORY OF PRESENT ILLNESS  Caio continues to struggle with high blood sugars. He has met with the diabetic educator and the Lantus has been increased to 24 unit(s) daily. He has also been taking Novolog throughout the day based on a sliding scale. He just doesn't understand what changed, since getting home from the hospital. He is monitoring his blood sugars closely with the FreeStyle Nina 2 sensor and his numbers are pretty consistently over 200 mg/dl. He has not had any issues with low blood sugars.    Caio was diagnosed with diabetes about 20 yrs ago. He states that he had been told that he had pre-diabetes before being officially diagnosed. He was previously taking metformin 2000 mg daily, glipizide 10 mg daily and Lantus 40 unit(s) at night. This seemed to work well. On January 9th, he underwent an echocardiogram due to ongoing exertional chest pain. The stress test was abnormal and he developed worsening hypertension. He was then admitted for angiogram and later underwent PCI to mid and distal RCA. During the hospitalization there was concern regarding worsening kidney function, so both the metformin and glipizide were discontinued. He was discharged on MDI therapy.    Caio understands the importance of getting  his blood sugars under better control. He states that he has been feeling pretty good, since the hospitalization but he is worried about his blood sugars. He has been trying to make healthy dietary decisions but admits he has a bit of a sweet tooth. He also recently started cardiac rehab. He has not had any problems with blurred vision or an increase in numbness/tingling in his feet. He also has not had any issues with injection site reactions.     Endocrine relevant labs are as follows:   Latest Reference Range & Units 01/06/25 11:39   Hemoglobin A1C 0.0 - 5.6 % 7.2 (H)   (H): Data is abnormally high   Latest Reference Range & Units 10/21/24 13:20   Hemoglobin A1C 0.0 - 5.6 % 7.9 (H)   (H): Data is abnormally high   Latest Reference Range & Units 05/16/24 10:32   Albumin Urine mg/g Cr 0.00 - 17.00 mg/g Cr 151.99 (H)   (H): Data is abnormally high   Latest Reference Range & Units 05/16/24 10:32   Albumin Urine mg/L mg/L 57.3     REVIEW OF SYSTEMS    Endocrine: positive for diabetes  Skin: negative  Eyes: negative for, visual blurring, redness, tearing  Ears/Nose/Throat: negative  Respiratory: No shortness of breath, dyspnea on exertion, cough, or hemoptysis  Cardiovascular: positive for exercise intolerance/he recently started cardiac rehab, negative for, chest pain, dyspnea on exertion, and lower extremity edema  Gastrointestinal: negative for, nausea, vomiting, constipation, and diarrhea  Genitourinary: negative for, nocturia, dysuria, frequency, and urgency  Musculoskeletal: negative for, muscular weakness, nocturnal cramping, and foot pain  Neurologic: negative for, local weakness, numbness or tingling of hands, and numbness or tingling of feet  Psychiatric: negative  Hematologic/Lymphatic/Immunologic: negative    Past Medical History  Past Medical History:   Diagnosis Date     BPH (benign prostatic hyperplasia)      Cellulitis and abscess of trunk 06/27/2017     Depression      Depressive disorder       Diverticular disease of colon      GERD (gastroesophageal reflux disease)      Hyperlipidemia LDL goal <100 10/31/2010     Hypertension goal BP (blood pressure) < 140/90 03/17/2011     Hypothyroidism 01/12/2010     Morbid obesity due to excess calories (H) 01/12/2010     Neuropathy in diabetes (H) 01/12/2010     Obesity 01/12/2010     PVD (peripheral vascular disease)      Sleep apnea 01/12/2010    CPAP     Type 2 diabetes, HbA1C goal < 8% (H) 03/08/2011     Vitamin B12 deficiency (non anemic)        Medications  Current Outpatient Medications   Medication Sig Dispense Refill     acetaminophen (TYLENOL) 325 MG tablet Take 2 tablets (650 mg) by mouth every 4 hours as needed for other (For optimal non-opioid multimodal pain management to improve pain control.)       amLODIPine (NORVASC) 5 MG tablet Take 1 tablet by mouth daily at 2 pm.       aspirin 81 MG EC tablet Take 1 tablet (81 mg) by mouth daily. 30 tablet 3     Calcium Carb-Cholecalciferol (CALCIUM 600 + D PO) Take 1 tablet by mouth daily       Continuous Glucose Sensor (FREESTYLE GIULIANA 2 SENSOR) MISC Inject 1 each subcutaneously every 14 days. Use 1 sensor every 14 days. Use to read blood sugars per 's instructions. 6 each 0     finasteride (PROSCAR) 5 MG tablet Take 1 tablet (5 mg) by mouth daily. 90 tablet 1     gabapentin (NEURONTIN) 300 MG capsule Take 1 capsule (300 mg) by mouth 3 times daily (Patient taking differently: Take 300 mg by mouth as needed.) 270 capsule 1     insulin aspart (NOVOLOG PEN) 100 UNIT/ML pen 4 times a day before meals and at bedtime. Sliding scale of 1:50> 150. Est TDD: 50 units/day 15 mL 1     insulin glargine 100 UNIT/ML pen Inject 17 Units subcutaneously daily. DOSE CHANGE 30 mL 2     insulin pen needle (B-D U/F) 31G X 5 MM miscellaneous USE 1 DAILY OR AS DIRECTED. 100 each 3     isosorbide mononitrate (IMDUR) 60 MG 24 hr tablet Take 1 tablet (60 mg) by mouth daily. DO NOT CRUSH. Can split tablet in half along score  herve. 90 tablet 3     latanoprost (XALATAN) 0.005 % ophthalmic solution INSTILL 1 DROP INTO BOTH EYES IN THE EVENING       LEVOXYL 137 MCG tablet Take 1 tablet (137 mcg) by mouth daily. 90 tablet 3     metoprolol tartrate (LOPRESSOR) 25 MG tablet Take 0.5 tablets (12.5 mg) by mouth 2 times daily. 90 tablet 3     mirtazapine (REMERON) 45 MG tablet Take 1 tablet (45 mg) by mouth at bedtime. 90 tablet 0     MULTI-VITAMIN OR TABS Take 1 tablet by mouth daily 30 0     nitroGLYcerin (NITROSTAT) 0.4 MG sublingual tablet For chest pain place 1 tablet under the tongue every 5 minutes for 3 doses. If symptoms persist 5 minutes after 1st dose call 911. 30 tablet 11     prasugrel (EFFIENT) 10 MG TABS tablet Take 1 tablet (10 mg) by mouth daily. Do not crush or break tablet. Dose to start tomorrow. 90 tablet 3     rosuvastatin (CRESTOR) 40 MG tablet Take 1 tablet (40 mg) by mouth daily. 90 tablet 3     senna-docusate (SENOKOT-S/PERICOLACE) 8.6-50 MG tablet Take 1 tablet by mouth 2 times daily as needed for constipation 20 tablet 0     sodium bicarbonate 325 MG tablet Take 325 mg by mouth 2 times daily.       vilazodone (VIIBRYD) 20 MG TABS tablet Take 1 tablet (20 mg) by mouth daily. 30 tablet 1       Allergies  No Known Allergies    Family History  family history includes Alzheimer Disease (age of onset: 82) in his mother; Anxiety Disorder in his brother; Asthma in his brother; Breast Cancer in his mother; Cancer in his paternal grandfather; Cancer - colorectal in his father; Circulatory in his paternal grandmother; Depression in his brother, brother, father, and mother; Diabetes in his brother, brother and another family member; Heart Disease in his maternal grandmother; Hypertension in his mother; Mental Illness in his brother; No Known Problems in his daughter and son; Obesity in his mother; Other - See Comments in his father; Other Cancer in his father; Prostate Cancer in his father; Thyroid Disease in his father and  mother.    Social History  Social History     Tobacco Use     Smoking status: Never     Smokeless tobacco: Never     Tobacco comments:     Smoked for about 6 months when I was 16 but haven't touched them since   Vaping Use     Vaping status: Never Used   Substance Use Topics     Alcohol use: Yes     Comment: yearly     Drug use: Never       Physical Exam  There is no height or weight on file to calculate BMI.  GENERAL: no distress  SKIN: Visible skin clear. No significant rash, abnormal pigmentation or lesions.  EYES: Eyes grossly normal to inspection.  No discharge or erythema, or obvious scleral/conjunctival abnormalities.  NECK: visible goiter is not present; no visible neck masses  RESP: No audible wheeze, cough, or visible cyanosis.  No visible retractions or increased work of breathing.    NEURO: Awake, alert, responds appropriately to questions.  Mentation and speech fluent.  PSYCH:affect normal and appearance well-groomed.      DATA REVIEW  FreeStyle LibreView  Time in target range 4%  High 17%, Very High 79%  Current Ave  mg/dl        Virtual Visit Details    Type of service:  Video Visit     Originating Location (pt. Location): Home  {PROVIDER LOCATION On-site should be selected for visits conducted from your clinic location or adjoining [facility] hospital, academic office, or other nearby [facility] building. Off-site should be selected for all other provider locations, including home:[id number]}  Distant Location (provider location):  Off-site  Platform used for Video Visit: [employer]      Again, thank you for allowing me to participate in the care of your patient.        Sincerely,        Cara Stauffer PA-C    Electronically signed

## 2025-01-29 NOTE — PROGRESS NOTES
Virtual Visit Details    Type of service:  Video Visit     Originating Location (pt. Location): Home    Distant Location (provider location):  Off-site  Platform used for Video Visit: [employer]

## 2025-01-30 ENCOUNTER — MEDICAL CORRESPONDENCE (OUTPATIENT)
Dept: HEALTH INFORMATION MANAGEMENT | Facility: CLINIC | Age: 65
End: 2025-01-30
Payer: MEDICARE

## 2025-01-30 DIAGNOSIS — N18.32 STAGE 3B CHRONIC KIDNEY DISEASE (H): Primary | ICD-10-CM

## 2025-01-31 ENCOUNTER — HOSPITAL ENCOUNTER (OUTPATIENT)
Dept: CARDIAC REHAB | Facility: CLINIC | Age: 65
Discharge: HOME | End: 2025-01-31
Attending: STUDENT IN AN ORGANIZED HEALTH CARE EDUCATION/TRAINING PROGRAM
Payer: MEDICARE

## 2025-01-31 PROBLEM — D50.9 IRON DEFICIENCY ANEMIA, UNSPECIFIED: Status: ACTIVE | Noted: 2025-01-31

## 2025-01-31 PROBLEM — D63.1 ANEMIA OF CHRONIC RENAL FAILURE: Status: ACTIVE | Noted: 2025-01-31

## 2025-01-31 PROBLEM — N18.9 ANEMIA OF CHRONIC RENAL FAILURE: Status: ACTIVE | Noted: 2025-01-31

## 2025-01-31 PROBLEM — N18.30 CHRONIC KIDNEY DISEASE, STAGE III (MODERATE) (H): Status: ACTIVE | Noted: 2025-01-31

## 2025-01-31 PROCEDURE — 999N000104 HC STATISTIC NO CHARGE

## 2025-02-03 ENCOUNTER — TELEPHONE (OUTPATIENT)
Dept: EDUCATION SERVICES | Facility: CLINIC | Age: 65
End: 2025-02-03

## 2025-02-03 NOTE — TELEPHONE ENCOUNTER
[facility] Call Center    Phone Message    May a detailed message be left on voicemail: yes     Reason for Call: Other: patient calling to check to see if his appt on 2/5/2025 can be virtual. Please review thank you.      Action Taken: Message routed to:  [facility] ([facility] diab ed    Travel Screening: Not Applicable     Date of Service:

## 2025-02-03 NOTE — TELEPHONE ENCOUNTER
Called patient, offered telephone visit. He decided it would be best to come in for the appointment.    No change to scheduled appointment.     Megan LOWEN, RN, PHN, [facility]

## 2025-02-05 ENCOUNTER — ALLIED HEALTH/NURSE VISIT (OUTPATIENT)
Dept: EDUCATION SERVICES | Facility: CLINIC | Age: 65
End: 2025-02-05
Payer: MEDICARE

## 2025-02-05 DIAGNOSIS — E11.40 TYPE 2 DIABETES MELLITUS WITH DIABETIC NEUROPATHY, WITH LONG-TERM CURRENT USE OF INSULIN (H): Primary | ICD-10-CM

## 2025-02-05 DIAGNOSIS — Z79.4 TYPE 2 DIABETES MELLITUS WITH DIABETIC NEUROPATHY, WITH LONG-TERM CURRENT USE OF INSULIN (H): Primary | ICD-10-CM

## 2025-02-05 PROCEDURE — G0108 DIAB MANAGE TRN  PER INDIV: HCPCS

## 2025-02-05 NOTE — LETTER
2/5/2025         RE: Steve Morgan  [id number]  [facility] [address]        Dear Colleague,    Thank you for referring your patient, Steve Morgan, to the [facility]. Please see a copy of my visit note below.    Diabetes Self-Management Education & Support    Presents for: Individual review    Type of Service: In Person Visit      Assessment  Patient here for follow up on elevated glucose and medication management. He states he is consisently taking his insulin. Sometimes he skips a meal then skips the insulin dose, andif he is eating out he takes the novolog before leaving the house. He is leaving Saturday to [place] for vacation and will be staying in an all inclusive resort. He plans to eat consistent meals while he is there, and the food is plentiful.    See CGM Reports in Monitoring section below.  Review of CGM report. Glucose overall average 275mg/dl,  in target 10%, above target 90% and below target 0% of the time. Pattern of post meal hyperglycemia.     Reviewed healthy eating and carbohydrate sources, discussed balancing meals and watching portions while on vacation. He would benefit from taking a Novolog dose at his meals, then adding the correction dose. Recommend a base dose of 3 units then the correction dose, adjusted correction to start at 1:50 > 190 since adding the base dose will allow more insulin.   Discussed timing of insulin and the risk of low blood sugar if he is taking the Novolog before leaving the house and the benefit of taking it close to the meal time.        Care Plan and Education Provided:  Healthy Eating: Balanced meals, Plate planning method, and Portion control, Monitoring: Individual glucose targets, Taking Medication: Action of prescribed medication(s) and When to take medication(s), and Problem Solving: High glucose - causes, signs/symptoms, treatment and prevention and Low glucose - causes, signs/symptoms, treatment and  prevention    Patient verbalized understanding of diabetes self-management education concepts discussed, opportunities for ongoing education and support, and recommendations provided today.    Plan  Meal Plan Recommendation: eat 3 meals a day, have small snacks between meals, if needed, use portion control, use plate planning method, and work on balancing your meals.   Check blood sugars: scan your sensor frequently- when you wake up, before each meal, and at bedtime    Medication:    Lantus: continue 28 units every morning    Novolog/Humalog: take it right before the meal, take 3 units at breakfast, 3 units at lunch and 3 units at dinner.   Plus add the following correction dose to the meal:  For Pre-Meal BG less than 140 subtract 1 unit.   For Pre-Meal  - 189 add 0 unit.   For Pre-Meal  - 239 add 1 units.   For Pre-Meal  - 289 add 2 units.   For Pre-Meal  - 339 add 3 units.   For Pre-Meal  - 389 add 4 units.   For Pre-Meal  - 439 add 5 units   For Pre-Meal BG greater than or equal to 440 give 6 units.      Check your blood sugar before bed and take the following Novolog/Humalog dose:   Do Not give Bedtime Correction Insulin if BG less than  200.   For  - 249 give 1 units.   For  - 299 give 2 units.   For  - 349 give 3 units.   For  - or greater 4 units   ** If you skip the meal then only take the correction dose.     Follow up:  Follow-up diabetes education appointment scheduled on March 5th at 1:00pm.   Will discuss carbohydrate counting at this appointment.     Follow-up:  Upcoming Diabetes Ed Appointments     Visit Type Date Time Department    DIABETES ED 2/5/2025  1:00 PM [facility] DIABETES ED    DIABETES ED 3/5/2025  1:00 PM [facility] DIABETES ED        See Care Plan for co-developed, patient-state behavior change goals.    Education Materials Provided:  -- My Plate Planner       Subjective/Objective  Caio is an 64 year old year old, presenting for the following  "diabetes education related to: Presents for: Individual review  Accompanied by: Self  Diabetes education in the past 24mo: Yes  Focus of Visit: Taking Medication  Diabetes type: Type 2  Disease course: Worsening  How confident are you filling out medical forms by yourself:: Extremely  Transportation concerns: No  Difficulty affording diabetes medication?: Sometimes  Difficulty affording diabetes testing supplies?: Sometimes  Other concerns:: Physical impairment  Cultural Influences/Ethnic Background:  Not  or     Diabetes Symptoms & Complications:  Diabetes Related Symptoms: Fatigue, Polyphagia (increased hunger), Visual change  Weight trend: Increasing  Symptom course: Stable  Disease course: Worsening  Complications assessed today?: No    Patient Problem List and Family Medical History reviewed for relevant medical history, current medical status, and diabetes risk factors.    Vitals:  There were no vitals taken for this visit.  Estimated body mass index is 39.94 kg/m  as calculated from the following:    Height as of 1/23/25: 1.651 m (5' 5\").    Weight as of 1/23/25: 108.9 kg (240 lb).   Last 3 BP:   BP Readings from Last 3 Encounters:   01/23/25 (!) 144/70   01/15/25 (!) 148/79   01/10/25 (!) 165/84       History   Smoking Status     Never   Smokeless Tobacco     Never       Labs:  Lab Results   Component Value Date    A1C 7.2 01/06/2025    A1C 7.4 02/22/2021     Lab Results   Component Value Date     01/23/2025     01/15/2025     09/24/2021     01/16/2020     Lab Results   Component Value Date    LDL 51 01/15/2025    LDL 88 03/05/2020     HDL Cholesterol   Date Value Ref Range Status   03/05/2020 33 (L) >39 mg/dL Final     Direct Measure HDL   Date Value Ref Range Status   01/15/2025 29 (L) >=40 mg/dL Final   ]  GFR Estimate   Date Value Ref Range Status   01/23/2025 48 (L) >60 mL/min/1.73m2 Final     Comment:     eGFR calculated using 2021 CKD-EPI equation. " "  01/16/2020 79 >60 mL/min/[1.73_m2] Final     Comment:     Non  GFR Calc  Starting 12/18/2018, serum creatinine based estimated GFR (eGFR) will be   calculated using the Chronic Kidney Disease Epidemiology Collaboration   (CKD-EPI) equation.       GFR Estimate If Black   Date Value Ref Range Status   01/16/2020 >90 >60 mL/min/[1.73_m2] Final     Comment:      GFR Calc  Starting 12/18/2018, serum creatinine based estimated GFR (eGFR) will be   calculated using the Chronic Kidney Disease Epidemiology Collaboration   (CKD-EPI) equation.       Lab Results   Component Value Date    CR 1.59 01/23/2025    CR 1.01 01/16/2020     No results found for: \"MICROALBUMIN\"    Healthy Eating:  Healthy Eating Assessed Today: Yes  Cultural/Cheondoism diet restrictions?: No  How many times a week on average do you eat food made away from home (restaurant/take-out)?: 1  Meals include: Breakfast, Lunch, Dinner, Evening Snack  Breakfast: cereal ( shredded wheat, cheerios with splenda), milk, yogurt(light and fit) Or eggs  Lunch: skip or ham sandwich  Dinner: [employer] boiled dinner- (beef bone broth and vegetables) OR meatloaf, mashed potatoes,  Snacks: BBQ chips, pretzels, salty snacks, sweets- cookies, cake, candy  Beverages: Water, Milk  Has patient met with a dietitian in the past?: Yes      Monitoring:  Monitoring Assessed Today: Yes  Did patient bring glucose meter to appointment? : Yes  Blood Glucose Meter: FreeStyle, CGM  Times checking blood sugar at home (number): Other (see Comments)  Times checking blood sugar at home (per): Day                Taking Medications:  Diabetes Medication(s)       Insulin       insulin aspart (NOVOLOG PEN) 100 UNIT/ML pen 4 times a day before meals and at bedtime. Sliding scale of 1:50> 150. Est TDD: 50 units/day     insulin glargine 100 UNIT/ML pen Inject 17 Units subcutaneously daily. DOSE CHANGE       Incretin Mimetic Agents       dulaglutide (TRULICITY) 0.75 " MG/0.5ML pen Inject 0.75 mg subcutaneously every 7 days for 28 days.     Patient not taking: Reported on 2/5/2025          Taking Medication Assessed Today: Yes  Current Treatments: Insulin Injections  Dose schedule: Pre-breakfast  Given by: Patient  Injection/Infusion sites: Abdomen  Problems taking diabetes medications regularly?: Yes (consistently taking the insulin but misses oral medication)  Diabetes medication side effects?: No    Problem Solving:  Problem Solving Assessed Today: Yes  Is the patient at risk for hypoglycemia?: Yes  Hypoglycemia Frequency: Other (not recently)  Hypoglycemia: Feeling shaky       Megan Case RN  Time Spent: 45 minutes  Encounter Type: Individual    Any diabetes medication dose changes were [facility] the [facility] under the patient's referring provider.

## 2025-02-05 NOTE — PROGRESS NOTES
Diabetes Self-Management Education & Support    Presents for: Individual review    Type of Service: In Person Visit      Assessment  Patient here for follow up on elevated glucose and medication management. He states he is consisently taking his insulin. Sometimes he skips a meal then skips the insulin dose, andif he is eating out he takes the novolog before leaving the house. He is leaving Saturday to [place] for vacation and will be staying in an all inclusive resort. He plans to eat consistent meals while he is there, and the food is plentiful.  He was not able to  the Trulicity prescription it was $900    See CGM Reports in Monitoring section below.  Review of CGM report. Glucose overall average 275mg/dl,  in target 10%, above target 90% and below target 0% of the time. Pattern of post meal hyperglycemia.     Reviewed healthy eating and carbohydrate sources, discussed balancing meals and watching portions while on vacation. He would benefit from taking a Novolog dose at his meals, then adding the correction dose. Recommend a base dose of 3 units then the correction dose, adjusted correction to start at 1:50 > 190 since adding the base dose will allow more insulin.   Discussed timing of insulin and the risk of low blood sugar if he is taking the Novolog before leaving the house and the benefit of taking it close to the meal time.      Checked on cost of Soliqua and Xultophy, the cost would be $35 which is what he is paying for each type of insulin. Since he is going on vacation and he has several Lantus pens, he prefers to wait to change the medication. Will re-evaluate at his follow up appointment.       Care Plan and Education Provided:  Healthy Eating: Balanced meals, Plate planning method, and Portion control, Monitoring: Individual glucose targets, Taking Medication: Action of prescribed medication(s) and When to take medication(s), and Problem Solving: High glucose - causes, signs/symptoms, treatment  and prevention and Low glucose - causes, signs/symptoms, treatment and prevention    Patient verbalized understanding of diabetes self-management education concepts discussed, opportunities for ongoing education and support, and recommendations provided today.    Plan  Meal Plan Recommendation: eat 3 meals a day, have small snacks between meals, if needed, use portion control, use plate planning method, and work on balancing your meals.   Check blood sugars: scan your sensor frequently- when you wake up, before each meal, and at bedtime    Medication:    Lantus: continue 28 units every morning    Novolog/Humalog: take it right before the meal, take 3 units at breakfast, 3 units at lunch and 3 units at dinner.   Plus add the following correction dose to the meal:  For Pre-Meal BG less than 140 subtract 1 unit.   For Pre-Meal  - 189 add 0 unit.   For Pre-Meal  - 239 add 1 units.   For Pre-Meal  - 289 add 2 units.   For Pre-Meal  - 339 add 3 units.   For Pre-Meal  - 389 add 4 units.   For Pre-Meal  - 439 add 5 units   For Pre-Meal BG greater than or equal to 440 give 6 units.      Check your blood sugar before bed and take the following Novolog/Humalog dose:   Do Not give Bedtime Correction Insulin if BG less than  200.   For  - 249 give 1 units.   For  - 299 give 2 units.   For  - 349 give 3 units.   For  - or greater 4 units   ** If you skip the meal then only take the correction dose.     Follow up:  Follow-up diabetes education appointment scheduled on March 5th at 1:00pm.   Will discuss carbohydrate counting at this appointment.     Follow-up:  Upcoming Diabetes Ed Appointments     Visit Type Date Time Department    DIABETES ED 2/5/2025  1:00 PM CR DIABETES ED    DIABETES ED 3/5/2025  1:00 PM CR DIABETES ED        See Care Plan for co-developed, patient-state behavior change goals.    Education Materials Provided:  -- My Plate Planner  "      Subjective/Objective  Caio is an 64 year old year old, presenting for the following diabetes education related to: Presents for: Individual review  Accompanied by: Self  Diabetes education in the past 24mo: Yes  Focus of Visit: Taking Medication  Diabetes type: Type 2  Disease course: Worsening  How confident are you filling out medical forms by yourself:: Extremely  Transportation concerns: No  Difficulty affording diabetes medication?: Sometimes  Difficulty affording diabetes testing supplies?: Sometimes  Other concerns:: Physical impairment  Cultural Influences/Ethnic Background:  Not  or     Diabetes Symptoms & Complications:  Diabetes Related Symptoms: Fatigue, Polyphagia (increased hunger), Visual change  Weight trend: Increasing  Symptom course: Stable  Disease course: Worsening  Complications assessed today?: No    Patient Problem List and Family Medical History reviewed for relevant medical history, current medical status, and diabetes risk factors.    Vitals:  There were no vitals taken for this visit.  Estimated body mass index is 39.94 kg/m  as calculated from the following:    Height as of 1/23/25: 1.651 m (5' 5\").    Weight as of 1/23/25: 108.9 kg (240 lb).   Last 3 BP:   BP Readings from Last 3 Encounters:   01/23/25 (!) 144/70   01/15/25 (!) 148/79   01/10/25 (!) 165/84       History   Smoking Status    Never   Smokeless Tobacco    Never       Labs:  Lab Results   Component Value Date    A1C 7.2 01/06/2025    A1C 7.4 02/22/2021     Lab Results   Component Value Date     01/23/2025     01/15/2025     09/24/2021     01/16/2020     Lab Results   Component Value Date    LDL 51 01/15/2025    LDL 88 03/05/2020     HDL Cholesterol   Date Value Ref Range Status   03/05/2020 33 (L) >39 mg/dL Final     Direct Measure HDL   Date Value Ref Range Status   01/15/2025 29 (L) >=40 mg/dL Final   ]  GFR Estimate   Date Value Ref Range Status   01/23/2025 48 (L) >60 " "mL/min/1.73m2 Final     Comment:     eGFR calculated using 2021 CKD-EPI equation.   01/16/2020 79 >60 mL/min/[1.73_m2] Final     Comment:     Non  GFR Calc  Starting 12/18/2018, serum creatinine based estimated GFR (eGFR) will be   calculated using the Chronic Kidney Disease Epidemiology Collaboration   (CKD-EPI) equation.       GFR Estimate If Black   Date Value Ref Range Status   01/16/2020 >90 >60 mL/min/[1.73_m2] Final     Comment:      GFR Calc  Starting 12/18/2018, serum creatinine based estimated GFR (eGFR) will be   calculated using the Chronic Kidney Disease Epidemiology Collaboration   (CKD-EPI) equation.       Lab Results   Component Value Date    CR 1.59 01/23/2025    CR 1.01 01/16/2020     No results found for: \"MICROALBUMIN\"    Healthy Eating:  Healthy Eating Assessed Today: Yes  Cultural/Protestant diet restrictions?: No  How many times a week on average do you eat food made away from home (restaurant/take-out)?: 1  Meals include: Breakfast, Lunch, Dinner, Evening Snack  Breakfast: cereal ( shredded wheat, cheerios with splenda), milk, yogurt(light and fit) Or eggs  Lunch: skip or ham sandwich  Dinner: [employer] boiled dinner- (beef bone broth and vegetables) OR meatloaf, mashed potatoes,  Snacks: BBQ chips, pretzels, salty snacks, sweets- cookies, cake, candy  Beverages: Water, Milk  Has patient met with a dietitian in the past?: Yes      Monitoring:  Monitoring Assessed Today: Yes  Did patient bring glucose meter to appointment? : Yes  Blood Glucose Meter: FreeStyle, CGM  Times checking blood sugar at home (number): Other (see Comments)  Times checking blood sugar at home (per): Day                Taking Medications:  Diabetes Medication(s)       Insulin       insulin aspart (NOVOLOG PEN) 100 UNIT/ML pen 4 times a day before meals and at bedtime. Sliding scale of 1:50> 150. Est TDD: 50 units/day     insulin glargine 100 UNIT/ML pen Inject 17 Units subcutaneously " daily. DOSE CHANGE       Incretin Mimetic Agents       dulaglutide (TRULICITY) 0.75 MG/0.5ML pen Inject 0.75 mg subcutaneously every 7 days for 28 days.     Patient not taking: Reported on 2/5/2025          Taking Medication Assessed Today: Yes  Current Treatments: Insulin Injections  Dose schedule: Pre-breakfast  Given by: Patient  Injection/Infusion sites: Abdomen  Problems taking diabetes medications regularly?: Yes (consistently taking the insulin but misses oral medication)  Diabetes medication side effects?: No    Problem Solving:  Problem Solving Assessed Today: Yes  Is the patient at risk for hypoglycemia?: Yes  Hypoglycemia Frequency: Other (not recently)  Hypoglycemia: Feeling shaky       Megan Case RN  Time Spent: 45 minutes  Encounter Type: Individual    Any diabetes medication dose changes were made via the [facility] Standing Orders under the patient's referring provider.

## 2025-02-05 NOTE — PATIENT INSTRUCTIONS
Care Plan:  Meal Plan Recommendation: eat 3 meals a day, have small snacks between meals, if needed, use portion control, use plate planning method, and work on balancing your meals.   Check blood sugars: scan your sensor frequently- when you wake up, before each meal, and at bedtime    Medication:    Lantus: continue 28 units every morning    Novolog/Humalog: take it right before the meal, take 3 units at breakfast, 3 units at lunch and 3 units at dinner.   Plus add the following correction dose to the meal:  For Pre-Meal BG less than 140 subtract 1 unit.   For Pre-Meal  - 189 add 0 unit.   For Pre-Meal  - 239 add 1 units.   For Pre-Meal  - 289 add 2 units.   For Pre-Meal  - 339 add 3 units.   For Pre-Meal  - 389 add 4 units.   For Pre-Meal  - 439 add 5 units   For Pre-Meal BG greater than or equal to 440 give 6 units.      Check your blood sugar before bed and take the following Novolog/Humalog dose:   Do Not give Bedtime Correction Insulin if BG less than  200.   For  - 249 give 1 units.   For  - 299 give 2 units.   For  - 349 give 3 units.   For  - or greater 4 units   ** If you skip the meal then only take the correction dose.     Follow up:  Follow-up diabetes education appointment scheduled on March 5th at 1:00pm.     [place] [facility] for the [facility]:  For Your Diabetes or Nutrition Education Appointments Call:  [phone]   For Diabetes or Nutrition Related Questions:   [phone]  Send [email or web address] Message   If you need a medication refill please contact your pharmacy. Please allow 3 business days for your refills to be completed.

## 2025-02-07 ENCOUNTER — HOSPITAL ENCOUNTER (OUTPATIENT)
Dept: CARDIAC REHAB | Facility: CLINIC | Age: 65
Discharge: HOME | End: 2025-02-07
Attending: STUDENT IN AN ORGANIZED HEALTH CARE EDUCATION/TRAINING PROGRAM
Payer: MEDICARE

## 2025-02-07 PROCEDURE — 93797 PHYS/QHP OP CAR RHAB WO ECG: CPT

## 2025-02-07 PROCEDURE — 93798 PHYS/QHP OP CAR RHAB W/ECG: CPT

## 2025-02-15 ASSESSMENT — SLEEP AND FATIGUE QUESTIONNAIRES
HOW LIKELY ARE YOU TO NOD OFF OR FALL ASLEEP WHEN YOU ARE A PASSENGER IN A CAR FOR AN HOUR WITHOUT A BREAK: WOULD NEVER DOZE
HOW LIKELY ARE YOU TO NOD OFF OR FALL ASLEEP WHILE WATCHING TV: MODERATE CHANCE OF DOZING
HOW LIKELY ARE YOU TO NOD OFF OR FALL ASLEEP WHILE SITTING INACTIVE IN A PUBLIC PLACE: SLIGHT CHANCE OF DOZING
HOW LIKELY ARE YOU TO NOD OFF OR FALL ASLEEP WHILE LYING DOWN TO REST IN THE AFTERNOON WHEN CIRCUMSTANCES PERMIT: SLIGHT CHANCE OF DOZING
HOW LIKELY ARE YOU TO NOD OFF OR FALL ASLEEP WHILE SITTING AND READING: SLIGHT CHANCE OF DOZING
HOW LIKELY ARE YOU TO NOD OFF OR FALL ASLEEP WHILE SITTING QUIETLY AFTER LUNCH WITHOUT ALCOHOL: WOULD NEVER DOZE
HOW LIKELY ARE YOU TO NOD OFF OR FALL ASLEEP IN A CAR, WHILE STOPPED FOR A FEW MINUTES IN TRAFFIC: WOULD NEVER DOZE
HOW LIKELY ARE YOU TO NOD OFF OR FALL ASLEEP WHILE SITTING AND TALKING TO SOMEONE: WOULD NEVER DOZE

## 2025-03-05 ENCOUNTER — MYC MEDICAL ADVICE (OUTPATIENT)
Dept: EDUCATION SERVICES | Facility: CLINIC | Age: 65
End: 2025-03-05

## 2025-03-05 ENCOUNTER — TELEPHONE (OUTPATIENT)
Dept: CARDIOLOGY | Facility: CLINIC | Age: 65
End: 2025-03-05

## 2025-03-05 ENCOUNTER — VIRTUAL VISIT (OUTPATIENT)
Dept: EDUCATION SERVICES | Facility: CLINIC | Age: 65
End: 2025-03-05
Payer: MEDICARE

## 2025-03-05 DIAGNOSIS — E11.40 TYPE 2 DIABETES MELLITUS WITH DIABETIC NEUROPATHY, WITH LONG-TERM CURRENT USE OF INSULIN (H): ICD-10-CM

## 2025-03-05 DIAGNOSIS — Z79.4 TYPE 2 DIABETES MELLITUS WITH DIABETIC NEUROPATHY, WITH LONG-TERM CURRENT USE OF INSULIN (H): ICD-10-CM

## 2025-03-05 PROCEDURE — G0108 DIAB MANAGE TRN  PER INDIV: HCPCS | Mod: 95

## 2025-03-05 RX ORDER — INSULIN GLARGINE 100 [IU]/ML
36 INJECTION, SOLUTION SUBCUTANEOUS DAILY
Qty: 30 ML | Refills: 2 | Status: SHIPPED | OUTPATIENT
Start: 2025-03-05

## 2025-03-05 NOTE — PATIENT INSTRUCTIONS
Care Plan:  Continue to work on healthy eating and balanced meals. Make sure to incorporate vegetables to your plan.variety is good!    Medication:  Lantus: increase to 36 units  starting tomorrow morning, if by Sunday your glucose is still consistently above 200, then increase to --> 42 units     Novolog/Humalog: take it right before the meal, take 6 units at breakfast, 6 units at lunch and 6 units at dinner.   Plus add the following correction dose to the meal:  For Pre-Meal BG less than 140 subtract 1 unit.   For Pre-Meal  - 189 add 0 unit.   For Pre-Meal  - 239 add 1 units.   For Pre-Meal  - 289 add 2 units.   For Pre-Meal  - 339 add 3 units.   For Pre-Meal  - 389 add 4 units.   For Pre-Meal  - 439 add 5 units   For Pre-Meal BG greater than or equal to 440 give 6 units.   ** If you skip the meal then only take the correction dose.       Check your blood sugar before bed and take the following Novolog/Humalog dose:   Do Not give Bedtime Correction Insulin if BG less than  200.   For  - 249 give 2 units.   For  - 299 give 3 units.   For  - 349 give 4 units.   For  - or greater 5 units       Follow up:  Follow-up diabetes education appointment scheduled on 3/20/25 at 1:00pm. Make sure to upload the reader before the appointment.      Bring blood glucose meter and logbook with you to all doctor and follow-up appointments.     [place] [facility] Nutrition [facility] for the [facility]:  For Your Diabetes or Nutrition Education Appointments Call:  [phone]   For Diabetes or Nutrition Related Questions:   [phone]  Send [employer] Message   If you need a medication refill please contact your pharmacy. Please allow 3 business days for your refills to be completed.

## 2025-03-05 NOTE — LETTER
3/5/2025         RE: Steve Morgan  [id number]  [facility] [address]        Dear Colleague,    Thank you for referring your patient, Steve Morgan, to the [facility]. Please see a copy of my visit note below.             Lantus: continue 28 units every morning  --> increase to 36 units --> 42 units --> 50 units    Novolog/Humalog: take it right before the meal, take 6 units at breakfast, 6 units at lunch and 6 units at dinner.   Plus add the following correction dose to the meal:  For Pre-Meal BG less than 140 subtract 1 unit.   For Pre-Meal  - 189 add 0 unit.   For Pre-Meal  - 239 add 1 units.   For Pre-Meal  - 289 add 2 units.   For Pre-Meal  - 339 add 3 units.   For Pre-Meal  - 389 add 4 units.   For Pre-Meal  - 439 add 5 units   For Pre-Meal BG greater than or equal to 440 give 6 units.      Check your blood sugar before bed and take the following Novolog/Humalog dose:   Do Not give Bedtime Correction Insulin if BG less than  200.   For  - 249 give 2 units.   For  - 299 give 3 units.   For  - 349 give 4 units.   For  - or greater 5 units   ** If you skip the meal then only take the correction dose.       Diabetes Self-Management Education & Support    Presents for: Individual review    Type of service:  Video Visit    If the video visit is dropped, the video visit invitation should be resent by: Text to cell phone: [phone]    Originating Location (pt. Location): Home  Distant Location (provider location): Offsite  Mode of Communication:  Video Conference via [employer]    Video Start Time:  1:00pm  Video End Time (time video stopped): 1:32pm    How would patient like to obtain AVS? MyChart      Assessment  Patient with consistent hyperglycemia. He was on vacation so was eating was different then he had increase stress due to his dad passing away while he was on vacation. He is working to get back on  track. He has ongoing counseling to help him deal with the loss of his dad. He reports consistently taking his medication as prescribed.     See CGM Reports in Monitoring section below.  Review of CGM report. Glucose overall average 325mg/dl,  in target 1%, above target 99% and below target 0% of the time.   Pattern of hyperglycemia     We previously talked about changing to Soliqua($35 copay). He still has 4 boxes of Lantus and prefers to use it before making a change.  He will let us know when he is on the last box of Lantus. Recommend increase insulin until he is ready to change to Soliqua.    Care Plan and Education Provided:  Healthy Eating: Consistency in amount and timing of carbohydrate intake, Monitoring: Individual glucose targets and Log and interpret results, Taking Medication: Action of prescribed medication(s) and When to take medication(s) and dosing, Problem Solving: High glucose - causes, signs/symptoms, treatment and prevention and Low glucose - causes, signs/symptoms, treatment and prevention, and Healthy Coping: Benefits of making appropriate lifestyle changes, Identifying helpful resources, and Methods of coping with stress    Patient verbalized understanding of diabetes self-management education concepts discussed, opportunities for ongoing education and support, and recommendations provided today.    Plan  Continue to work on healthy eating and balanced meals. Make sure to incorporate vegetables to your plan.variety is good!    Medication:  Lantus: increase to 36 units  starting tomorrow morning, if by Sunday your glucose is still consistently above 200, then increase to --> 42 units     Novolog/Humalog: take it right before the meal, take 6 units at breakfast, 6 units at lunch and 6 units at dinner.   Plus add the following correction dose to the meal:  For Pre-Meal BG less than 140 subtract 1 unit.   For Pre-Meal  - 189 add 0 unit.   For Pre-Meal  - 239 add 1 units.   For  Pre-Meal  - 289 add 2 units.   For Pre-Meal  - 339 add 3 units.   For Pre-Meal  - 389 add 4 units.   For Pre-Meal  - 439 add 5 units   For Pre-Meal BG greater than or equal to 440 give 6 units.   ** If you skip the meal then only take the correction dose.       Check your blood sugar before bed and take the following Novolog/Humalog dose:   Do Not give Bedtime Correction Insulin if BG less than  200.   For  - 249 give 2 units.   For  - 299 give 3 units.   For  - 349 give 4 units.   For  - or greater 5 units       Follow up:  Follow-up diabetes education appointment scheduled on 3/20/25 at 1:00pm. Make sure to upload the reader before the appointment.     Follow-up:  Upcoming Diabetes Ed Appointments     Visit Type Date Time Department    DIABETES ED 3/5/2025  1:00 PM [facility] DIABETES ED    DIABETES ED 3/20/2025  1:00 PM [facility] DIABETES ED        See Care Plan for co-developed, patient-state behavior change goals.    Education Materials Provided:  No new materials provided today      Subjective/Objective  Ciao is an 65 year old year old, presenting for the following diabetes education related to: Presents for: Individual review  Accompanied by: Self  Diabetes education in the past 24mo: Yes  Focus of Visit: Taking Medication  Diabetes type: Type 2  Disease course: Worsening  How confident are you filling out medical forms by yourself:: Extremely  Transportation concerns: No  Difficulty affording diabetes medication?: Sometimes  Difficulty affording diabetes testing supplies?: Sometimes  Other concerns:: Physical impairment  Cultural Influences/Ethnic Background:  Not  or     Diabetes Symptoms & Complications:  Diabetes Related Symptoms: Fatigue, Polyphagia (increased hunger), Visual change  Weight trend: Increasing  Symptom course: Stable  Disease course: Worsening  Complications assessed today?: No    Patient Problem List and Family Medical History reviewed for  "relevant medical history, current medical status, and diabetes risk factors.    Vitals:  There were no vitals taken for this visit.  Estimated body mass index is 39.94 kg/m  as calculated from the following:    Height as of 1/23/25: 1.651 m (5' 5\").    Weight as of 1/23/25: 108.9 kg (240 lb).   Last 3 BP:   BP Readings from Last 3 Encounters:   01/23/25 (!) 144/70   01/15/25 (!) 148/79   01/10/25 (!) 165/84       History   Smoking Status     Never   Smokeless Tobacco     Never       Labs:  Lab Results   Component Value Date    A1C 7.2 01/06/2025    A1C 7.4 02/22/2021     Lab Results   Component Value Date     01/23/2025     01/15/2025     09/24/2021     01/16/2020     Lab Results   Component Value Date    LDL 51 01/15/2025    LDL 88 03/05/2020     HDL Cholesterol   Date Value Ref Range Status   03/05/2020 33 (L) >39 mg/dL Final     Direct Measure HDL   Date Value Ref Range Status   01/15/2025 29 (L) >=40 mg/dL Final   ]  GFR Estimate   Date Value Ref Range Status   01/23/2025 48 (L) >60 mL/min/1.73m2 Final     Comment:     eGFR calculated using 2021 CKD-EPI equation.   01/16/2020 79 >60 mL/min/[1.73_m2] Final     Comment:     Non  GFR Calc  Starting 12/18/2018, serum creatinine based estimated GFR (eGFR) will be   calculated using the Chronic Kidney Disease Epidemiology Collaboration   (CKD-EPI) equation.       GFR Estimate If Black   Date Value Ref Range Status   01/16/2020 >90 >60 mL/min/[1.73_m2] Final     Comment:      GFR Calc  Starting 12/18/2018, serum creatinine based estimated GFR (eGFR) will be   calculated using the Chronic Kidney Disease Epidemiology Collaboration   (CKD-EPI) equation.       Lab Results   Component Value Date    CR 1.59 01/23/2025    CR 1.01 01/16/2020     No results found for: \"MICROALBUMIN\"    Healthy Eating:  Healthy Eating Assessed Today: Yes  Cultural/[employer] diet restrictions?: No  How many times a week on average do you " eat food made away from home (restaurant/take-out)?: 1  Meals include: Breakfast, Lunch, Dinner, Evening Snack  Breakfast: cereal ( shredded wheat, cheerios with splenda), milk, yogurt(light and fit) Or eggs  Lunch: skip or ham sandwich or cheese and crackers  Dinner: [place] boiled dinner- (beef bone broth and vegetables) OR meatloaf, mashed potatoes,  Snacks: BBQ chips, pretzels, salty snacks, sweets- cookies, cake, candy  Beverages: Water, Milk  Has patient met with a dietitian in the past?: Yes      Monitoring:  Monitoring Assessed Today: Yes  Did patient bring glucose meter to appointment? : Yes  Blood Glucose Meter: FreeStyle, CGM  Times checking blood sugar at home (number): Other (see Comments)  Times checking blood sugar at home (per): Day            Taking Medications:  Diabetes Medication(s)       Insulin       insulin aspart (NOVOLOG PEN) 100 UNIT/ML pen 4 times a day before meals and at bedtime. Sliding scale of 1:50> 150. Est TDD: 50 units/day     insulin glargine 100 UNIT/ML pen Inject 17 Units subcutaneously daily. DOSE CHANGE          Taking Medication Assessed Today: Yes  Current Treatments: Insulin Injections  Dose schedule: Pre-breakfast  Given by: Patient  Injection/Infusion sites: Abdomen  Problems taking diabetes medications regularly?: Yes (consistently taking the insulin but misses oral medication)  Diabetes medication side effects?: No    Problem Solving:  Problem Solving Assessed Today: Yes  Is the patient at risk for hypoglycemia?: Yes  Hypoglycemia Frequency: Other (not recently)  Hypoglycemia: Feeling shaky       Healthy Coping:  Healthy Coping Assessed Today: Yes  Emotional response to diabetes: Ready to learn  Informal Support system:: Children, Family, Spouse  Stage of change: ACTION (Actively working towards change)    Megan Case RN  Time Spent: 30 minutes  Encounter Type: Individual    Any diabetes medication dose changes were made via the CDCES Standing Orders under the  patient's referring provider.

## 2025-03-05 NOTE — PROGRESS NOTES
Lantus: continue 28 units every morning  --> increase to 36 units --> 42 units --> 50 units    Novolog/Humalog: take it right before the meal, take 6 units at breakfast, 6 units at lunch and 6 units at dinner.   Plus add the following correction dose to the meal:  For Pre-Meal BG less than 140 subtract 1 unit.   For Pre-Meal  - 189 add 0 unit.   For Pre-Meal  - 239 add 1 units.   For Pre-Meal  - 289 add 2 units.   For Pre-Meal  - 339 add 3 units.   For Pre-Meal  - 389 add 4 units.   For Pre-Meal  - 439 add 5 units   For Pre-Meal BG greater than or equal to 440 give 6 units.      Check your blood sugar before bed and take the following Novolog/Humalog dose:   Do Not give Bedtime Correction Insulin if BG less than  200.   For  - 249 give 2 units.   For  - 299 give 3 units.   For  - 349 give 4 units.   For  - or greater 5 units   ** If you skip the meal then only take the correction dose.       Diabetes Self-Management Education & Support    Presents for: Individual review    Type of service:  Video Visit    If the video visit is dropped, the video visit invitation should be resent by: Text to cell phone: [phone]    Originating Location (pt. Location): Home  Distant Location (provider location): Offsite  Mode of Communication:  Video Conference via [employer]    Video Start Time:  1:00pm  Video End Time (time video stopped): 1:32pm    How would patient like to obtain AVS? MyChart      Assessment  Patient with consistent hyperglycemia. He was on vacation so was eating was different then he had increase stress due to his dad passing away while he was on vacation. He is working to get back on track. He has ongoing counseling to help him deal with the loss of his dad. He reports consistently taking his medication as prescribed.     See CGM Reports in Monitoring section below.  Review of CGM report. Glucose overall average 325mg/dl,  in target 1%,  above target 99% and below target 0% of the time.   Pattern of hyperglycemia     We previously talked about changing to Soliqua($35 copay). He still has 4 boxes of Lantus and prefers to use it before making a change.  He will let us know when he is on the last box of Lantus. Recommend increase insulin until he is ready to change to Soliqua.    Care Plan and Education Provided:  Healthy Eating: Consistency in amount and timing of carbohydrate intake, Monitoring: Individual glucose targets and Log and interpret results, Taking Medication: Action of prescribed medication(s) and When to take medication(s) and dosing, Problem Solving: High glucose - causes, signs/symptoms, treatment and prevention and Low glucose - causes, signs/symptoms, treatment and prevention, and Healthy Coping: Benefits of making appropriate lifestyle changes, Identifying helpful resources, and Methods of coping with stress    Patient verbalized understanding of diabetes self-management education concepts discussed, opportunities for ongoing education and support, and recommendations provided today.    Plan  Continue to work on healthy eating and balanced meals. Make sure to incorporate vegetables to your plan.variety is good!    Medication:  Lantus: increase to 36 units  starting tomorrow morning, if by Sunday your glucose is still consistently above 200, then increase to --> 42 units     Novolog/Humalog: take it right before the meal, take 6 units at breakfast, 6 units at lunch and 6 units at dinner.   Plus add the following correction dose to the meal:  For Pre-Meal BG less than 140 subtract 1 unit.   For Pre-Meal  - 189 add 0 unit.   For Pre-Meal  - 239 add 1 units.   For Pre-Meal  - 289 add 2 units.   For Pre-Meal  - 339 add 3 units.   For Pre-Meal  - 389 add 4 units.   For Pre-Meal  - 439 add 5 units   For Pre-Meal BG greater than or equal to 440 give 6 units.   ** If you skip the meal then only take the  "correction dose.       Check your blood sugar before bed and take the following Novolog/Humalog dose:   Do Not give Bedtime Correction Insulin if BG less than  200.   For  - 249 give 2 units.   For  - 299 give 3 units.   For  - 349 give 4 units.   For  - or greater 5 units       Follow up:  Follow-up diabetes education appointment scheduled on 3/20/25 at 1:00pm. Make sure to upload the reader before the appointment.     Follow-up:  Upcoming Diabetes Ed Appointments     Visit Type Date Time Department    DIABETES ED 3/5/2025  1:00 PM [facility] DIABETES ED    DIABETES ED 3/20/2025  1:00 PM CR DIABETES ED        See Care Plan for co-developed, patient-state behavior change goals.    Education Materials Provided:  No new materials provided today      Subjective/Objective  Caio is an 65 year old year old, presenting for the following diabetes education related to: Presents for: Individual review  Accompanied by: Self  Diabetes education in the past 24mo: Yes  Focus of Visit: Taking Medication  Diabetes type: Type 2  Disease course: Worsening  How confident are you filling out medical forms by yourself:: Extremely  Transportation concerns: No  Difficulty affording diabetes medication?: Sometimes  Difficulty affording diabetes testing supplies?: Sometimes  Other concerns:: Physical impairment  Cultural Influences/Ethnic Background:  Not  or     Diabetes Symptoms & Complications:  Diabetes Related Symptoms: Fatigue, Polyphagia (increased hunger), Visual change  Weight trend: Increasing  Symptom course: Stable  Disease course: Worsening  Complications assessed today?: No    Patient Problem List and Family Medical History reviewed for relevant medical history, current medical status, and diabetes risk factors.    Vitals:  There were no vitals taken for this visit.  Estimated body mass index is 39.94 kg/m  as calculated from the following:    Height as of 1/23/25: 1.651 m (5' 5\").    Weight as " "of 1/23/25: 108.9 kg (240 lb).   Last 3 BP:   BP Readings from Last 3 Encounters:   01/23/25 (!) 144/70   01/15/25 (!) 148/79   01/10/25 (!) 165/84       History   Smoking Status    Never   Smokeless Tobacco    Never       Labs:  Lab Results   Component Value Date    A1C 7.2 01/06/2025    A1C 7.4 02/22/2021     Lab Results   Component Value Date     01/23/2025     01/15/2025     09/24/2021     01/16/2020     Lab Results   Component Value Date    LDL 51 01/15/2025    LDL 88 03/05/2020     HDL Cholesterol   Date Value Ref Range Status   03/05/2020 33 (L) >39 mg/dL Final     Direct Measure HDL   Date Value Ref Range Status   01/15/2025 29 (L) >=40 mg/dL Final   ]  GFR Estimate   Date Value Ref Range Status   01/23/2025 48 (L) >60 mL/min/1.73m2 Final     Comment:     eGFR calculated using 2021 CKD-EPI equation.   01/16/2020 79 >60 mL/min/[1.73_m2] Final     Comment:     Non  GFR Calc  Starting 12/18/2018, serum creatinine based estimated GFR (eGFR) will be   calculated using the Chronic Kidney Disease Epidemiology Collaboration   (CKD-EPI) equation.       GFR Estimate If Black   Date Value Ref Range Status   01/16/2020 >90 >60 mL/min/[1.73_m2] Final     Comment:      GFR Calc  Starting 12/18/2018, serum creatinine based estimated GFR (eGFR) will be   calculated using the Chronic Kidney Disease Epidemiology Collaboration   (CKD-EPI) equation.       Lab Results   Component Value Date    CR 1.59 01/23/2025    CR 1.01 01/16/2020     No results found for: \"MICROALBUMIN\"    Healthy Eating:  Healthy Eating Assessed Today: Yes  Cultural/[place] diet restrictions?: No  How many times a week on average do you eat food made away from home (restaurant/take-out)?: 1  Meals include: Breakfast, Lunch, Dinner, Evening Snack  Breakfast: cereal ( shredded wheat, cheerios with splenda), milk, yogurt(light and fit) Or eggs  Lunch: skip or ham sandwich or cheese and " crackers  Dinner: [place] boiled dinner- (beef bone broth and vegetables) OR meatloaf, mashed potatoes,  Snacks: BBQ chips, pretzels, salty snacks, sweets- cookies, cake, candy  Beverages: Water, Milk  Has patient met with a dietitian in the past?: Yes      Monitoring:  Monitoring Assessed Today: Yes  Did patient bring glucose meter to appointment? : Yes  Blood Glucose Meter: FreeStyle, CGM  Times checking blood sugar at home (number): Other (see Comments)  Times checking blood sugar at home (per): Day            Taking Medications:  Diabetes Medication(s)       Insulin       insulin aspart (NOVOLOG PEN) 100 UNIT/ML pen 4 times a day before meals and at bedtime. Sliding scale of 1:50> 150. Est TDD: 50 units/day     insulin glargine 100 UNIT/ML pen Inject 17 Units subcutaneously daily. DOSE CHANGE          Taking Medication Assessed Today: Yes  Current Treatments: Insulin Injections  Dose schedule: Pre-breakfast  Given by: Patient  Injection/Infusion sites: Abdomen  Problems taking diabetes medications regularly?: Yes (consistently taking the insulin but misses oral medication)  Diabetes medication side effects?: No    Problem Solving:  Problem Solving Assessed Today: Yes  Is the patient at risk for hypoglycemia?: Yes  Hypoglycemia Frequency: Other (not recently)  Hypoglycemia: Feeling shaky       Healthy Coping:  Healthy Coping Assessed Today: Yes  Emotional response to diabetes: Ready to learn  Informal Support system:: Children, Family, Spouse  Stage of change: ACTION (Actively working towards change)    Megan Case RN  Time Spent: 30 minutes  Encounter Type: Individual    Any diabetes medication dose changes were made via the CDCES Standing Orders under the patient's referring provider.

## 2025-03-05 NOTE — TELEPHONE ENCOUNTER
Sent message to pt via [employer].     Amarilis YEPEZ  [facility]

## 2025-03-19 ENCOUNTER — MYC MEDICAL ADVICE (OUTPATIENT)
Dept: EDUCATION SERVICES | Facility: CLINIC | Age: 65
End: 2025-03-19
Payer: MEDICARE

## 2025-03-19 DIAGNOSIS — E11.40 TYPE 2 DIABETES MELLITUS WITH DIABETIC NEUROPATHY, WITH LONG-TERM CURRENT USE OF INSULIN (H): ICD-10-CM

## 2025-03-19 DIAGNOSIS — Z79.4 TYPE 2 DIABETES MELLITUS WITH DIABETIC NEUROPATHY, WITH LONG-TERM CURRENT USE OF INSULIN (H): ICD-10-CM

## 2025-03-19 RX ORDER — INSULIN GLARGINE 100 [IU]/ML
50 INJECTION, SOLUTION SUBCUTANEOUS DAILY
Qty: 30 ML | Refills: 2 | Status: SHIPPED | OUTPATIENT
Start: 2025-03-19

## 2025-03-19 NOTE — TELEPHONE ENCOUNTER
Diabetes Follow-up    Subjective/Objective:    Steve Morgan requests review of glucose and monitoring report.   Comments/concerns: see mychart     Diabetes is being managed with Diabetes Medications   Diabetes Medication(s)       Insulin       insulin aspart (NOVOLOG PEN) 100 UNIT/ML pen 6 units before each meal plus a sliding scale of 1:50> 150. Est TDD: 50 units/day     insulin glargine 100 UNIT/ML pen Inject 42 Units subcutaneously daily. DOSE CHANGE do Not fill unless patient requests            Report:         Assessment/Plan:  Hi Caio,    Your glucose has improved a little but is still very high. Your biggest rise is after meals, I would like you to increase your mealtime dose from 6 units to --> 8 units, and continue to add the correction dose.  If by Saturday you continue to see your blood sugar spike after meals then increase to 10 units at each meal, plus the correction dose.   We should also have you increase the Lantus to 50 units.     Medication:  Lantus: increase to 50 units     Novolog/Humalog: take it right before the meal, take 10 units before breakfast, lunch and dinner.   Plus add the following correction dose to the meal:  For Pre-Meal BG less than 140 subtract 1 unit.   For Pre-Meal  - 189 add 0 unit.   For Pre-Meal  - 239 add 1 units.   For Pre-Meal  - 289 add 2 units.   For Pre-Meal  - 339 add 3 units.   For Pre-Meal  - 389 add 4 units.   For Pre-Meal  - 439 add 5 units   For Pre-Meal BG greater than or equal to 440 give 6 units.   ** If you skip the meal then only take the correction dose.         Check your blood sugar before bed and take the following Novolog/Humalog dose:   Do Not give Bedtime Correction Insulin if BG less than  200.   For  - 249 give 2 units.   For  - 299 give 3 units.   For  - 349 give 4 units.   For  - or greater 5 units        Megan LOWEN, RN, PHN, [facility]     Any diabetes medication dose changes were  made via the CDE Protocol and Collaborative Practice Agreement with the patient's referring provider. A copy of this encounter was shared with the provider.

## 2025-03-24 ENCOUNTER — LAB (OUTPATIENT)
Dept: LAB | Facility: CLINIC | Age: 65
End: 2025-03-24
Payer: MEDICARE

## 2025-03-24 ENCOUNTER — HOSPITAL ENCOUNTER (OUTPATIENT)
Dept: GENERAL RADIOLOGY | Facility: HOSPITAL | Age: 65
Discharge: HOME | End: 2025-03-24
Attending: INTERNAL MEDICINE | Admitting: INTERNAL MEDICINE
Payer: MEDICARE

## 2025-03-24 ENCOUNTER — OFFICE VISIT (OUTPATIENT)
Dept: RHEUMATOLOGY | Facility: CLINIC | Age: 65
End: 2025-03-24
Attending: PHYSICIAN ASSISTANT
Payer: MEDICARE

## 2025-03-24 VITALS
BODY MASS INDEX: 39.02 KG/M2 | HEART RATE: 62 BPM | SYSTOLIC BLOOD PRESSURE: 119 MMHG | WEIGHT: 234.5 LBS | DIASTOLIC BLOOD PRESSURE: 73 MMHG | OXYGEN SATURATION: 97 %

## 2025-03-24 DIAGNOSIS — M24.549 CONTRACTURE OF HAND: ICD-10-CM

## 2025-03-24 DIAGNOSIS — E11.42 DIABETIC POLYNEUROPATHY ASSOCIATED WITH TYPE 2 DIABETES MELLITUS (H): ICD-10-CM

## 2025-03-24 DIAGNOSIS — M24.549 CONTRACTURE OF HAND: Primary | ICD-10-CM

## 2025-03-24 LAB
ALBUMIN SERPL BCG-MCNC: 4 G/DL (ref 3.5–5.2)
ALT SERPL W P-5'-P-CCNC: 33 U/L (ref 0–70)
CREAT SERPL-MCNC: 1.69 MG/DL (ref 0.67–1.17)
EGFRCR SERPLBLD CKD-EPI 2021: 44 ML/MIN/1.73M2
RHEUMATOID FACT SERPL-ACNC: <10 IU/ML
URATE SERPL-MCNC: 5.8 MG/DL (ref 3.4–7)

## 2025-03-24 PROCEDURE — 86431 RHEUMATOID FACTOR QUANT: CPT

## 2025-03-24 PROCEDURE — 86039 ANTINUCLEAR ANTIBODIES (ANA): CPT

## 2025-03-24 PROCEDURE — 84550 ASSAY OF BLOOD/URIC ACID: CPT

## 2025-03-24 PROCEDURE — 82040 ASSAY OF SERUM ALBUMIN: CPT

## 2025-03-24 PROCEDURE — 86200 CCP ANTIBODY: CPT

## 2025-03-24 PROCEDURE — 82565 ASSAY OF CREATININE: CPT

## 2025-03-24 PROCEDURE — 84460 ALANINE AMINO (ALT) (SGPT): CPT

## 2025-03-24 PROCEDURE — 73130 X-RAY EXAM OF HAND: CPT | Mod: 50

## 2025-03-24 PROCEDURE — 86038 ANTINUCLEAR ANTIBODIES: CPT

## 2025-03-24 PROCEDURE — 36415 COLL VENOUS BLD VENIPUNCTURE: CPT

## 2025-03-24 RX ORDER — GLIPIZIDE 10 MG/1
10 TABLET, FILM COATED, EXTENDED RELEASE ORAL
COMMUNITY
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
## 2025-03-25 ENCOUNTER — TELEPHONE (OUTPATIENT)
Dept: FAMILY MEDICINE | Facility: CLINIC | Age: 65
End: 2025-03-25
Payer: MEDICARE

## 2025-03-25 DIAGNOSIS — F33.1 MAJOR DEPRESSIVE DISORDER, RECURRENT EPISODE, MODERATE (H): ICD-10-CM

## 2025-03-25 LAB
ANA PAT SER IF-IMP: ABNORMAL
ANA SER QL IF: POSITIVE
ANA TITR SER IF: ABNORMAL {TITER}

## 2025-03-25 NOTE — TELEPHONE ENCOUNTER
"Patient calls requesting refill of mirtazapine (REMERON) 45 MG tablet and vilazodone (VIIBRYD) 20 MG TABS tablet   Previously prescribed by Dr Anant Pinto psychiatry  Patient has since \"graduated\" and meds will be taken over by PCP  Patient has not established with new PCP since Lisa left  Has establish care visit with Sindhu on 5/1    Ok to bridge until appointment?    Medications pended, please review and sign if appropriate.    Guerita Loya RN     "

## 2025-03-25 NOTE — TELEPHONE ENCOUNTER
Patient calling and states he had labs drawn for rheumatology yesterday and is to have another blood draw for kidney doctor and wants to know if needs to wait any certain amount of time in between.  Discussed we did not order either of labs orders he is having done.  Advised I am not sure when kidney provider wants him to do lab they ordered.  He states the kidney provider wants ASAP.  Advised he does not need to wait any certain amount of time from labs being drawn yesterday til has lab drawn ordered by kidney provider.  He states he has lab appt already scheduled for Friday.  Advised that is fine to have lab drawn Friday.  Daisy Bradley RN

## 2025-03-26 ENCOUNTER — MYC MEDICAL ADVICE (OUTPATIENT)
Dept: EDUCATION SERVICES | Facility: CLINIC | Age: 65
End: 2025-03-26
Payer: MEDICARE

## 2025-03-26 LAB — CCP AB SER IA-ACNC: 15 U/ML

## 2025-03-26 RX ORDER — VILAZODONE HYDROCHLORIDE 20 MG/1
20 TABLET ORAL DAILY
Qty: 90 TABLET | Refills: 0 | Status: SHIPPED | OUTPATIENT
Start: 2025-03-26

## 2025-03-26 RX ORDER — MIRTAZAPINE 45 MG/1
45 TABLET, FILM COATED ORAL AT BEDTIME
Qty: 90 TABLET | Refills: 0 | Status: SHIPPED | OUTPATIENT
Start: 2025-03-26

## 2025-03-27 ENCOUNTER — VIRTUAL VISIT (OUTPATIENT)
Dept: EDUCATION SERVICES | Facility: CLINIC | Age: 65
End: 2025-03-27
Payer: MEDICARE

## 2025-03-27 DIAGNOSIS — Z79.4 TYPE 2 DIABETES MELLITUS WITH DIABETIC NEUROPATHY, WITH LONG-TERM CURRENT USE OF INSULIN (H): Primary | ICD-10-CM

## 2025-03-27 DIAGNOSIS — E11.40 TYPE 2 DIABETES MELLITUS WITH DIABETIC NEUROPATHY, WITH LONG-TERM CURRENT USE OF INSULIN (H): Primary | ICD-10-CM

## 2025-03-27 NOTE — LETTER
3/27/2025         RE: Steve Morgan  [id number]  [facility] [address]        Dear Colleague,    Thank you for referring your patient, Steve Morgan, to the [facility]. Please see a copy of my visit note below.    Diabetes Self-Management Education & Support    Presents for: Individual review    Type of service:  Video Visit    If the video visit is dropped, the video visit invitation should be resent by: Text to cell phone: [phone]    Originating Location (pt. Location): Home  Distant Location (provider location): [facility]  Mode of Communication:  Video Conference via [employer]    Video Time: 30:22 minutes    How would patient like to obtain AVS? MyChart    Assessment    See CGM Reports in Monitoring section below.  Review of CGM report. Glucose overall average 309mg/dl,  in target 12%, above target 88% and below target 0% of the time.   Pattern of consistently above goal with post meal hyperglycemia. He does drift down in the afternoon. He states he does not always eat lunch or will just have a snack of a sandwich or crackers and meat, if he skips lunch he skips the insulin dose     Averaging 10-14 units per meal and still not in goal, patient would benefit from a 20% increase to basal insulin, also recommend increase mealtime insulin by 20% to 12 units, and to 14 units after 3 days along with strengthen the correction scale. He is having a rise from his lunchtime snack, recommend he at least take 1/2 the mealtime dose for a snack.   We have previously discussed transitioning to insulin/glp1 combination medication since it is covered by his insurance and would only be 1 copay which is affordable to him. He still has several glargine pens and he would like to continue to use them, he also recently picked up a refill of Novolog.   If he continues on mealtime insulin he states his insurance requires him to use admelog or fiasp instead of  Novolog.     Care Plan and Education Provided:  Healthy Eating: Consistency in amount and timing of carbohydrate intake and Taking Medication: Action of prescribed medication(s), When to take medication(s), and dosing    Patient verbalized understanding of diabetes self-management education concepts discussed, opportunities for ongoing education and support, and recommendations provided today.    Plan  Continue to work on portions at meals, then add snacks between meals, if needed.     Medication:  Lantus( insulin glargine): - increase to 60 units daily  Novolog:    - Breakfast: inject 12 units before the meal, if after 3 days(Saturday) you are still seeing david blood sugars after the meal then increase to 14 units.   - Lunch: inject 12 units before the meal, or take 6 units for a snack with carbohydrates.(You will Not increase the lunch dose to 14 units)   - Dinner: inject 12 units before the meal, if after 3 days (Saturday) you are still seeing high blood sugars after the meal, then increase to 14 units.     Plus add the following correction dose to all meals:  For pre-meal Bg of less than 100- subtract 1 unit   For Pre-Meal -139 add 1 unit.   For Pre-Meal  - 189 add  2unit.   For Pre-Meal  - 239 add 3 units.   For Pre-Meal  - 289 add 4 units.   For Pre-Meal  - 339 add 5 units.   For Pre-Meal  - 389 add 6 units.   For Pre-Meal  - 439 add 7 units   For Pre-Meal BG greater than or equal to 440 add 8 units.   ** If you skip the meal then only take the correction dose.         Check your blood sugar before bed and take the following Novolog/Humalog dose:   Do Not give Bedtime Correction Insulin if BG less than  150.   For  - 199 give 2 units.   For  - 249 give 3 units.   For  - 299 give 4 units.   For  - 349 give 5 units.   For  - 399 give  6 units   For BG of 400 or greater give 7 units       Follow up:  Upload your Nina sensor data in 2 weeks  "and send a [employer] message with an update on how you are doing. Also let me know how many insulin pens of Lantus( insulin glargine) and Novolog you have left.     Follow up appointment in clinic on May 1st at 1:00pm.   Follow-up:  Upcoming Diabetes Ed Appointments     Visit Type Date Time Department    DIABETES ED 3/27/2025  2:30 PM CR DIABETES ED        See Care Plan for co-developed, patient-state behavior change goals.    Education Materials Provided:  No new materials provided today      Subjective/Objective  Caio is an 65 year old year old, presenting for the following diabetes education related to: Presents for: Individual review  Accompanied by: Self  Diabetes education in the past 24mo: Yes  Focus of Visit: Taking Medication  Diabetes type: Type 2  Disease course: Worsening  How confident are you filling out medical forms by yourself:: Extremely  Transportation concerns: No  Difficulty affording diabetes medication?: Sometimes  Difficulty affording diabetes testing supplies?: Sometimes  Other concerns:: Physical impairment  Cultural Influences/Ethnic Background:  Not  or       Diabetes Symptoms & Complications:  Diabetes Related Symptoms: Fatigue, Polyphagia (increased hunger), Visual change  Weight trend: Increasing  Symptom course: Stable  Disease course: Worsening  Complications assessed today?: No    Patient Problem List and Family Medical History reviewed for relevant medical history, current medical status, and diabetes risk factors.    Vitals:  There were no vitals taken for this visit.  Estimated body mass index is 39.02 kg/m  as calculated from the following:    Height as of 1/23/25: 1.651 m (5' 5\").    Weight as of 3/24/25: 106.4 kg (234 lb 8 oz).   Last 3 BP:   BP Readings from Last 3 Encounters:   03/24/25 119/73   01/23/25 (!) 144/70   01/15/25 (!) 148/79       History   Smoking Status     Never   Smokeless Tobacco     Never       Labs:  Lab Results   Component Value Date    A1C " "7.2 01/06/2025    A1C 7.4 02/22/2021     Lab Results   Component Value Date     01/23/2025     01/15/2025     09/24/2021     01/16/2020     Lab Results   Component Value Date    LDL 51 01/15/2025    LDL 88 03/05/2020     HDL Cholesterol   Date Value Ref Range Status   03/05/2020 33 (L) >39 mg/dL Final     Direct Measure HDL   Date Value Ref Range Status   01/15/2025 29 (L) >=40 mg/dL Final   ]  GFR Estimate   Date Value Ref Range Status   03/24/2025 44 (L) >60 mL/min/1.73m2 Final     Comment:     eGFR calculated using 2021 CKD-EPI equation.   01/16/2020 79 >60 mL/min/[1.73_m2] Final     Comment:     Non  GFR Calc  Starting 12/18/2018, serum creatinine based estimated GFR (eGFR) will be   calculated using the Chronic Kidney Disease Epidemiology Collaboration   (CKD-EPI) equation.       GFR Estimate If Black   Date Value Ref Range Status   01/16/2020 >90 >60 mL/min/[1.73_m2] Final     Comment:      GFR Calc  Starting 12/18/2018, serum creatinine based estimated GFR (eGFR) will be   calculated using the Chronic Kidney Disease Epidemiology Collaboration   (CKD-EPI) equation.       Lab Results   Component Value Date    CR 1.69 03/24/2025    CR 1.01 01/16/2020     No results found for: \"MICROALBUMIN\"      Monitoring:  Monitoring Assessed Today: Yes  Did patient bring glucose meter to appointment? : Yes  Blood Glucose Meter: FreeStyle, CGM  Times checking blood sugar at home (number): Other (see Comments)  Times checking blood sugar at home (per): Day            Taking Medications:  Diabetes Medication(s)       Insulin       insulin aspart (NOVOLOG PEN) 100 UNIT/ML pen 8 units before each meal plus a sliding scale of 1:50> 150. Est TDD: 50 units/day     insulin glargine 100 UNIT/ML pen Inject 50 Units subcutaneously daily. DOSE CHANGE do Not fill unless patient requests       Sulfonylureas       glipiZIDE (GLUCOTROL XL) 10 MG 24 hr tablet 10 mg.          Taking " Medication Assessed Today: Yes  Current Treatments: Insulin Injections  Dose schedule: Pre-breakfast  Given by: Patient  Injection/Infusion sites: Abdomen  Problems taking diabetes medications regularly?: Yes (consistently taking the insulin but misses oral medication)  Diabetes medication side effects?: No    Problem Solving:  Problem Solving Assessed Today: Yes  Is the patient at risk for hypoglycemia?: Yes  Hypoglycemia Frequency: Other (not recently)  Hypoglycemia: Feeling shaky       Megan Case RN  Time Spent: 30:22 minutes  Encounter Type: Individual    Any diabetes medication dose changes were [facility] the [facility] under the patient's referring provider.

## 2025-03-27 NOTE — PATIENT INSTRUCTIONS
Plan:  Continue to work on portions at meals, then add snacks between meals, if needed.     Medication:  Lantus( insulin glargine): - increase to 60 units daily  Novolog:    - Breakfast: inject 12 units before the meal, if after 3 days(Saturday) you are still seeing david blood sugars after the meal then increase to 14 units.   - Lunch: inject 12 units before the meal, or take 6 units for a snack with carbohydrates.(You will Not increase the lunch dose to 14 units)   - Dinner: inject 12 units before the meal, if after 3 days (Saturday) you are still seeing high blood sugars after the meal, then increase to 14 units.     Plus add the following correction dose to all meals:  For pre-meal Bg of less than 100- subtract 1 unit   For Pre-Meal -139 add 1 unit.   For Pre-Meal  - 189 add  2unit.   For Pre-Meal  - 239 add 3 units.   For Pre-Meal  - 289 add 4 units.   For Pre-Meal  - 339 add 5 units.   For Pre-Meal  - 389 add 6 units.   For Pre-Meal  - 439 add 7 units   For Pre-Meal BG greater than or equal to 440 add 8 units.   ** If you skip the meal then only take the correction dose.         Check your blood sugar before bed and take the following Novolog/Humalog dose:   Do Not give Bedtime Correction Insulin if BG less than  150.   For  - 199 give 2 units.   For  - 249 give 3 units.   For  - 299 give 4 units.   For  - 349 give 5 units.   For  - 399 give  6 units   For BG of 400 or greater give 7 units       Follow up:  Upload your Nina sensor data in 2 weeks and send a [employer] message with an update on how you are doing. Also let me know how many insulin pens of Lantus( insulin glargine) and Novolog you have left.     Follow up appointment in clinic on May 1st at 1:00pm.     [facility] for the [facility]:  For Your Diabetes or Nutrition Education Appointments Call:  [phone]   For Diabetes or  Nutrition Related Questions:   [phone]  Send [employer] Message   If you need a medication refill please contact your pharmacy. Please allow 3 business days for your refills to be completed.

## 2025-03-27 NOTE — PROGRESS NOTES
Diabetes Self-Management Education & Support    Presents for: Individual review    Type of service:  Video Visit    If the video visit is dropped, the video visit invitation should be resent by: Text to cell phone: [phone]    Originating Location (pt. Location): Home  Distant Location (provider location): [facility]  Mode of Communication:  Video Conference via [employer]    Video Time: 30:22 minutes    How would patient like to obtain AVS? MyChart    Assessment    See CGM Reports in Monitoring section below.  Review of CGM report. Glucose overall average 309mg/dl,  in target 12%, above target 88% and below target 0% of the time.   Pattern of consistently above goal with post meal hyperglycemia. He does drift down in the afternoon. He states he does not always eat lunch or will just have a snack of a sandwich or crackers and meat, if he skips lunch he skips the insulin dose     Averaging 10-14 units per meal and still not in goal, patient would benefit from a 20% increase to basal insulin, also recommend increase mealtime insulin by 20% to 12 units, and to 14 units after 3 days along with strengthen the correction scale. He is having a rise from his lunchtime snack, recommend he at least take 1/2 the mealtime dose for a snack.   We have previously discussed transitioning to insulin/glp1 combination medication since it is covered by his insurance and would only be 1 copay which is affordable to him. He still has several glargine pens and he would like to continue to use them, he also recently picked up a refill of Novolog.   If he continues on mealtime insulin he states his insurance requires him to use admelog or fiasp instead of Novolog.     Care Plan and Education Provided:  Healthy Eating: Consistency in amount and timing of carbohydrate intake and Taking Medication: Action of prescribed medication(s), When to take medication(s), and dosing    Patient verbalized understanding of  diabetes self-management education concepts discussed, opportunities for ongoing education and support, and recommendations provided today.    Plan  Continue to work on portions at meals, then add snacks between meals, if needed.     Medication:  Lantus( insulin glargine): - increase to 60 units daily  Novolog:    - Breakfast: inject 12 units before the meal, if after 3 days(Saturday) you are still seeing david blood sugars after the meal then increase to 14 units.   - Lunch: inject 12 units before the meal, or take 6 units for a snack with carbohydrates.(You will Not increase the lunch dose to 14 units)   - Dinner: inject 12 units before the meal, if after 3 days (Saturday) you are still seeing high blood sugars after the meal, then increase to 14 units.     Plus add the following correction dose to all meals:  For pre-meal Bg of less than 100- subtract 1 unit   For Pre-Meal -139 add 1 unit.   For Pre-Meal  - 189 add  2unit.   For Pre-Meal  - 239 add 3 units.   For Pre-Meal  - 289 add 4 units.   For Pre-Meal  - 339 add 5 units.   For Pre-Meal  - 389 add 6 units.   For Pre-Meal  - 439 add 7 units   For Pre-Meal BG greater than or equal to 440 add 8 units.   ** If you skip the meal then only take the correction dose.         Check your blood sugar before bed and take the following Novolog/Humalog dose:   Do Not give Bedtime Correction Insulin if BG less than  150.   For  - 199 give 2 units.   For  - 249 give 3 units.   For  - 299 give 4 units.   For  - 349 give 5 units.   For  - 399 give  6 units   For BG of 400 or greater give 7 units       Follow up:  Upload your Nina sensor data in 2 weeks and send a [employer] message with an update on how you are doing. Also let me know how many insulin pens of Lantus( insulin glargine) and Novolog you have left.     Follow up appointment in clinic on May 1st at 1:00pm.   Follow-up:  Upcoming Diabetes Ed  "Appointments     Visit Type Date Time Department    DIABETES ED 3/27/2025  2:30 PM  DIABETES ED        See Care Plan for co-developed, patient-state behavior change goals.    Education Materials Provided:  No new materials provided today      Subjective/Objective  Caio is an 65 year old year old, presenting for the following diabetes education related to: Presents for: Individual review  Accompanied by: Self  Diabetes education in the past 24mo: Yes  Focus of Visit: Taking Medication  Diabetes type: Type 2  Disease course: Worsening  How confident are you filling out medical forms by yourself:: Extremely  Transportation concerns: No  Difficulty affording diabetes medication?: Sometimes  Difficulty affording diabetes testing supplies?: Sometimes  Other concerns:: Physical impairment  Cultural Influences/Ethnic Background:  Not  or       Diabetes Symptoms & Complications:  Diabetes Related Symptoms: Fatigue, Polyphagia (increased hunger), Visual change  Weight trend: Increasing  Symptom course: Stable  Disease course: Worsening  Complications assessed today?: No    Patient Problem List and Family Medical History reviewed for relevant medical history, current medical status, and diabetes risk factors.    Vitals:  There were no vitals taken for this visit.  Estimated body mass index is 39.02 kg/m  as calculated from the following:    Height as of 1/23/25: 1.651 m (5' 5\").    Weight as of 3/24/25: 106.4 kg (234 lb 8 oz).   Last 3 BP:   BP Readings from Last 3 Encounters:   03/24/25 119/73   01/23/25 (!) 144/70   01/15/25 (!) 148/79       History   Smoking Status    Never   Smokeless Tobacco    Never       Labs:  Lab Results   Component Value Date    A1C 7.2 01/06/2025    A1C 7.4 02/22/2021     Lab Results   Component Value Date     01/23/2025     01/15/2025     09/24/2021     01/16/2020     Lab Results   Component Value Date    LDL 51 01/15/2025    LDL 88 03/05/2020     HDL " "Cholesterol   Date Value Ref Range Status   03/05/2020 33 (L) >39 mg/dL Final     Direct Measure HDL   Date Value Ref Range Status   01/15/2025 29 (L) >=40 mg/dL Final   ]  GFR Estimate   Date Value Ref Range Status   03/24/2025 44 (L) >60 mL/min/1.73m2 Final     Comment:     eGFR calculated using 2021 CKD-EPI equation.   01/16/2020 79 >60 mL/min/[1.73_m2] Final     Comment:     Non  GFR Calc  Starting 12/18/2018, serum creatinine based estimated GFR (eGFR) will be   calculated using the Chronic Kidney Disease Epidemiology Collaboration   (CKD-EPI) equation.       GFR Estimate If Black   Date Value Ref Range Status   01/16/2020 >90 >60 mL/min/[1.73_m2] Final     Comment:      GFR Calc  Starting 12/18/2018, serum creatinine based estimated GFR (eGFR) will be   calculated using the Chronic Kidney Disease Epidemiology Collaboration   (CKD-EPI) equation.       Lab Results   Component Value Date    CR 1.69 03/24/2025    CR 1.01 01/16/2020     No results found for: \"MICROALBUMIN\"      Monitoring:  Monitoring Assessed Today: Yes  Did patient bring glucose meter to appointment? : Yes  Blood Glucose Meter: FreeStyle, CGM  Times checking blood sugar at home (number): Other (see Comments)  Times checking blood sugar at home (per): Day            Taking Medications:  Diabetes Medication(s)       Insulin       insulin aspart (NOVOLOG PEN) 100 UNIT/ML pen 8 units before each meal plus a sliding scale of 1:50> 150. Est TDD: 50 units/day     insulin glargine 100 UNIT/ML pen Inject 50 Units subcutaneously daily. DOSE CHANGE do Not fill unless patient requests       Sulfonylureas       glipiZIDE (GLUCOTROL XL) 10 MG 24 hr tablet 10 mg.          Taking Medication Assessed Today: Yes  Current Treatments: Insulin Injections  Dose schedule: Pre-breakfast  Given by: Patient  Injection/Infusion sites: Abdomen  Problems taking diabetes medications regularly?: Yes (consistently taking the insulin but misses " oral medication)  Diabetes medication side effects?: No    Problem Solving:  Problem Solving Assessed Today: Yes  Is the patient at risk for hypoglycemia?: Yes  Hypoglycemia Frequency: Other (not recently)  Hypoglycemia: Feeling shaky       Megan Case RN  Time Spent: 30:22 minutes  Encounter Type: Individual    Any diabetes medication dose changes were made via the [facility] Standing Orders under the patient's referring provider.

## 2025-04-02 ENCOUNTER — INFUSION THERAPY VISIT (OUTPATIENT)
Dept: INFUSION THERAPY | Facility: CLINIC | Age: 65
End: 2025-04-02
Attending: INTERNAL MEDICINE
Payer: MEDICARE

## 2025-04-02 VITALS
HEART RATE: 67 BPM | TEMPERATURE: 97.9 F | OXYGEN SATURATION: 98 % | SYSTOLIC BLOOD PRESSURE: 118 MMHG | DIASTOLIC BLOOD PRESSURE: 65 MMHG | RESPIRATION RATE: 16 BRPM

## 2025-04-02 DIAGNOSIS — N18.9 ANEMIA OF CHRONIC RENAL FAILURE: ICD-10-CM

## 2025-04-02 DIAGNOSIS — D63.1 ANEMIA OF CHRONIC RENAL FAILURE: ICD-10-CM

## 2025-04-02 DIAGNOSIS — N18.30 CHRONIC KIDNEY DISEASE, STAGE III (MODERATE) (H): Primary | ICD-10-CM

## 2025-04-02 DIAGNOSIS — D50.9 IRON DEFICIENCY ANEMIA, UNSPECIFIED: ICD-10-CM

## 2025-04-02 PROCEDURE — 258N000003 HC RX IP 258 OP 636: Performed by: INTERNAL MEDICINE

## 2025-04-02 PROCEDURE — 250N000011 HC RX IP 250 OP 636: Mod: JZ | Performed by: INTERNAL MEDICINE

## 2025-04-02 PROCEDURE — 96365 THER/PROPH/DIAG IV INF INIT: CPT

## 2025-04-02 RX ORDER — DIPHENHYDRAMINE HYDROCHLORIDE 50 MG/ML
50 INJECTION, SOLUTION INTRAMUSCULAR; INTRAVENOUS
Start: 2025-04-04

## 2025-04-02 RX ORDER — MEPERIDINE HYDROCHLORIDE 25 MG/ML
25 INJECTION INTRAMUSCULAR; INTRAVENOUS; SUBCUTANEOUS
OUTPATIENT
Start: 2025-04-04

## 2025-04-02 RX ORDER — HEPARIN SODIUM,PORCINE 10 UNIT/ML
5-20 VIAL (ML) INTRAVENOUS DAILY PRN
OUTPATIENT
Start: 2025-04-04

## 2025-04-02 RX ORDER — DIPHENHYDRAMINE HYDROCHLORIDE 50 MG/ML
25 INJECTION, SOLUTION INTRAMUSCULAR; INTRAVENOUS
Start: 2025-04-04

## 2025-04-02 RX ORDER — EPINEPHRINE 1 MG/ML
0.3 INJECTION, SOLUTION INTRAMUSCULAR; SUBCUTANEOUS EVERY 5 MIN PRN
OUTPATIENT
Start: 2025-04-04

## 2025-04-02 RX ORDER — HEPARIN SODIUM (PORCINE) LOCK FLUSH IV SOLN 100 UNIT/ML 100 UNIT/ML
5 SOLUTION INTRAVENOUS
OUTPATIENT
Start: 2025-04-04

## 2025-04-02 RX ORDER — METHYLPREDNISOLONE SODIUM SUCCINATE 40 MG/ML
40 INJECTION INTRAMUSCULAR; INTRAVENOUS
Start: 2025-04-04

## 2025-04-02 RX ORDER — ALBUTEROL SULFATE 90 UG/1
1-2 INHALANT RESPIRATORY (INHALATION)
Start: 2025-04-04

## 2025-04-02 RX ORDER — ALBUTEROL SULFATE 0.83 MG/ML
2.5 SOLUTION RESPIRATORY (INHALATION)
OUTPATIENT
Start: 2025-04-04

## 2025-04-02 RX ADMIN — SODIUM CHLORIDE 250 ML: 0.9 INJECTION, SOLUTION INTRAVENOUS at 13:47

## 2025-04-02 RX ADMIN — IRON SUCROSE 300 MG: 20 INJECTION, SOLUTION INTRAVENOUS at 13:48

## 2025-04-02 NOTE — PROGRESS NOTES
Infusion Nursing Note:  Steve Morgan presents today for Venofer #1 of 3.    Patient seen by provider today: No   present during visit today: Not Applicable.    Note: Caio reports he is feeling well today.  Oriented to infusion center.  Educated on Venofer, including potential side effects, signs/symptoms to monitor for, and when to seek medical attention.  Written material on Venofer given to patient.  All questions answered.  Patient verbalized understanding and is in agreement with this plan.    Intravenous Access:  Peripheral IV placed via ultrasound, after 2 unsuccessful attempt without ultrasound.    Treatment Conditions:  Not Applicable.    Post Infusion Assessment:  Patient tolerated infusion without incident.  Blood return noted pre and post infusion.  Site patent and intact, free from redness, edema or discomfort.  No evidence of extravasations.  Access discontinued per protocol.     Discharge Plan:   Discharge instructions reviewed with: Patient.  Patient and/or family verbalized understanding of discharge instructions and all questions answered.  AVS to patient via [employer].  Patient will return 4/4 for next appointment.   Patient discharged in stable condition accompanied by: self.  Departure Mode: Ambulatory with walker.      Andrew Hooper RN

## 2025-04-06 ENCOUNTER — MYC MEDICAL ADVICE (OUTPATIENT)
Dept: EDUCATION SERVICES | Facility: CLINIC | Age: 65
End: 2025-04-06
Payer: MEDICARE

## 2025-04-06 DIAGNOSIS — E11.40 TYPE 2 DIABETES MELLITUS WITH DIABETIC NEUROPATHY, WITH LONG-TERM CURRENT USE OF INSULIN (H): ICD-10-CM

## 2025-04-06 DIAGNOSIS — Z79.4 TYPE 2 DIABETES MELLITUS WITH DIABETIC NEUROPATHY, WITH LONG-TERM CURRENT USE OF INSULIN (H): ICD-10-CM

## 2025-04-07 ENCOUNTER — INFUSION THERAPY VISIT (OUTPATIENT)
Dept: INFUSION THERAPY | Facility: CLINIC | Age: 65
End: 2025-04-07
Attending: INTERNAL MEDICINE
Payer: MEDICARE

## 2025-04-07 VITALS
DIASTOLIC BLOOD PRESSURE: 67 MMHG | SYSTOLIC BLOOD PRESSURE: 152 MMHG | HEART RATE: 68 BPM | OXYGEN SATURATION: 98 % | TEMPERATURE: 97.4 F | RESPIRATION RATE: 20 BRPM

## 2025-04-07 DIAGNOSIS — N18.9 ANEMIA OF CHRONIC RENAL FAILURE: ICD-10-CM

## 2025-04-07 DIAGNOSIS — N18.31 ANEMIA OF CHRONIC RENAL FAILURE, STAGE 3A (H): ICD-10-CM

## 2025-04-07 DIAGNOSIS — N18.30 CHRONIC KIDNEY DISEASE, STAGE III (MODERATE) (H): ICD-10-CM

## 2025-04-07 DIAGNOSIS — D63.1 ANEMIA OF CHRONIC RENAL FAILURE, STAGE 3A (H): ICD-10-CM

## 2025-04-07 DIAGNOSIS — D63.1 ANEMIA OF CHRONIC RENAL FAILURE: ICD-10-CM

## 2025-04-07 DIAGNOSIS — N18.31 STAGE 3A CHRONIC KIDNEY DISEASE (H): Primary | ICD-10-CM

## 2025-04-07 DIAGNOSIS — D50.9 IRON DEFICIENCY ANEMIA, UNSPECIFIED IRON DEFICIENCY ANEMIA TYPE: ICD-10-CM

## 2025-04-07 PROCEDURE — 96365 THER/PROPH/DIAG IV INF INIT: CPT

## 2025-04-07 PROCEDURE — 258N000003 HC RX IP 258 OP 636: Performed by: INTERNAL MEDICINE

## 2025-04-07 PROCEDURE — 250N000011 HC RX IP 250 OP 636: Performed by: INTERNAL MEDICINE

## 2025-04-07 RX ORDER — HEPARIN SODIUM,PORCINE 10 UNIT/ML
5-20 VIAL (ML) INTRAVENOUS DAILY PRN
Status: CANCELLED | OUTPATIENT
Start: 2025-04-08

## 2025-04-07 RX ORDER — ALBUTEROL SULFATE 90 UG/1
1-2 INHALANT RESPIRATORY (INHALATION)
Status: CANCELLED
Start: 2025-04-08

## 2025-04-07 RX ORDER — METHYLPREDNISOLONE SODIUM SUCCINATE 40 MG/ML
40 INJECTION INTRAMUSCULAR; INTRAVENOUS
Status: CANCELLED
Start: 2025-04-08

## 2025-04-07 RX ORDER — MEPERIDINE HYDROCHLORIDE 25 MG/ML
25 INJECTION INTRAMUSCULAR; INTRAVENOUS; SUBCUTANEOUS
Status: CANCELLED | OUTPATIENT
Start: 2025-04-08

## 2025-04-07 RX ORDER — DIPHENHYDRAMINE HYDROCHLORIDE 50 MG/ML
25 INJECTION, SOLUTION INTRAMUSCULAR; INTRAVENOUS
Status: CANCELLED
Start: 2025-04-08

## 2025-04-07 RX ORDER — EPINEPHRINE 1 MG/ML
0.3 INJECTION, SOLUTION INTRAMUSCULAR; SUBCUTANEOUS EVERY 5 MIN PRN
Status: CANCELLED | OUTPATIENT
Start: 2025-04-08

## 2025-04-07 RX ORDER — ALBUTEROL SULFATE 0.83 MG/ML
2.5 SOLUTION RESPIRATORY (INHALATION)
Status: CANCELLED | OUTPATIENT
Start: 2025-04-08

## 2025-04-07 RX ORDER — DIPHENHYDRAMINE HYDROCHLORIDE 50 MG/ML
50 INJECTION, SOLUTION INTRAMUSCULAR; INTRAVENOUS
Status: CANCELLED
Start: 2025-04-08

## 2025-04-07 RX ORDER — INSULIN GLARGINE 100 [IU]/ML
66 INJECTION, SOLUTION SUBCUTANEOUS DAILY
Qty: 30 ML | Refills: 2 | Status: SHIPPED | OUTPATIENT
Start: 2025-04-07

## 2025-04-07 RX ORDER — HEPARIN SODIUM (PORCINE) LOCK FLUSH IV SOLN 100 UNIT/ML 100 UNIT/ML
5 SOLUTION INTRAVENOUS
Status: CANCELLED | OUTPATIENT
Start: 2025-04-08

## 2025-04-07 RX ADMIN — IRON SUCROSE 300 MG: 20 INJECTION, SOLUTION INTRAVENOUS at 15:01

## 2025-04-07 NOTE — PROGRESS NOTES
Infusion Nursing Note:  Steve Blissens presents today for Venofer #3 of 3.    Patient seen by provider today: No   present during visit today: Not Applicable.    Note: N/A.      Intravenous Access:  Peripheral IV placed.    Treatment Conditions:  Not Applicable.      Post Infusion Assessment:  Patient tolerated infusion without incident.  Blood return noted pre and post infusion.  Site patent and intact, free from redness, edema or discomfort.  No evidence of extravasations.  Access discontinued per protocol.       Discharge Plan:   Discharge instructions reviewed with: Patient.  Patient and/or family verbalized understanding of discharge instructions and all questions answered.  AVS to patient via [employer].  Patient will return PRN for next appointment.   Patient discharged in stable condition accompanied by: self.  Departure Mode: Ambulatory with walker.      London Durand RN

## 2025-04-07 NOTE — TELEPHONE ENCOUNTER
Diabetes Follow-up    Subjective/Objective:    Steve Morgan requests review of glucose and monitoring report.   Comments/concerns: see mychart    Diabetes is being managed with Diabetes Medications   Diabetes Medication(s)       Insulin       insulin aspart (NOVOLOG PEN) 100 UNIT/ML pen 8 units before each meal plus a sliding scale of 1:50> 150. Est TDD: 50 units/day     insulin glargine 100 UNIT/ML pen Inject 50 Units subcutaneously daily. DOSE CHANGE do Not fill unless patient requests     Patient taking differently: Inject 60 Units subcutaneously daily. DOSE CHANGE do Not fill unless patient requests       Sulfonylureas       glipiZIDE (GLUCOTROL XL) 10 MG 24 hr tablet Take 10 mg by mouth daily.            Report:             Assessment/Plan:    Review of CGM report. Glucose overall average 251mg/dl,  in target 26%, above target 74% and below target 0% of the time.   Glucose improving with increased insulin doses, but still above goal. Recommend further increase.      Lantus( insulin glargine): - increase to 66 units daily  Novolog:    - Breakfast: increase to 16 units before the meal,    - Lunch:  increase to 14 units before the meal, or take 8 units if you are only having a snack with carbohydrates.   - Dinner: increase to 16 units before the meal, if after 3 days (Thursday) you continue to see high blood sugars after dinner then increase to 18 units.     Plus add the following correction dose to all meals:  For pre-meal Bg of less than 100- subtract 1 unit   For Pre-Meal -139 add 1 unit.   For Pre-Meal  - 189 add  2unit.   For Pre-Meal  - 239 add 3 units.   For Pre-Meal  - 289 add 4 units.   For Pre-Meal  - 339 add 5 units.   For Pre-Meal  - 389 add 6 units.   For Pre-Meal  - 439 add 7 units   For Pre-Meal BG greater than or equal to 440 add 8 units.   ** If you skip the meal then only take the correction dose.         Check your blood sugar before bed and take the  following Novolog/Humalog dose:   Do Not give Bedtime Correction Insulin if BG less than  150.   For  - 199 give 3 units.   For  - 249 give 4 units.   For  - 299 give 5 units.   For  - 349 give 6 units.   For  - 399 give  7 units   For BG of 400 or greater give 8 units       Megan LOWEN, RN, PHN, [facility]     Any diabetes medication dose changes were made via the [facility] Protocol and Collaborative Practice Agreement with the patient's referring provider. A copy of this encounter was shared with the provider.

## 2025-04-08 ENCOUNTER — MYC MEDICAL ADVICE (OUTPATIENT)
Dept: EDUCATION SERVICES | Facility: CLINIC | Age: 65
End: 2025-04-08
Payer: MEDICARE

## 2025-04-08 DIAGNOSIS — I21.4 NSTEMI (NON-ST ELEVATED MYOCARDIAL INFARCTION) (H): Primary | ICD-10-CM

## 2025-04-08 DIAGNOSIS — Z95.5 S/P CORONARY ARTERY STENT PLACEMENT: ICD-10-CM

## 2025-04-09 ENCOUNTER — TELEPHONE (OUTPATIENT)
Dept: PEDIATRICS | Facility: CLINIC | Age: 65
End: 2025-04-09
Payer: MEDICARE

## 2025-04-09 NOTE — TELEPHONE ENCOUNTER
Diabetes Follow-up    Subjective/Objective:    Steve Morgan requests review of glucose and monitoring report.   Comments/concerns: see mychart message    Diabetes is being managed with Diabetes Medications   Diabetes Medication(s)       Insulin       insulin aspart (NOVOLOG PEN) 100 UNIT/ML pen 14-18 units before each meal plus a sliding scale of 1:50> 150. Est TDD: 75 units/day     insulin glargine 100 UNIT/ML pen Inject 66 Units subcutaneously daily. DOSE CHANGE do Not fill unless patient requests       Sulfonylureas       glipiZIDE (GLUCOTROL XL) 10 MG 24 hr tablet Take 10 mg by mouth daily.            Report:         Assessment/Plan:  See mychart message    Megan LOWEN, RN, PHN, CDCES     Any diabetes medication dose changes were made via the CDE Protocol and Collaborative Practice Agreement with the patient's referring provider. A copy of this encounter was shared with the provider.

## 2025-04-09 NOTE — TELEPHONE ENCOUNTER
Pt says that someone tried to call him, so he is returning our call. Unable to find any notes that anyone from our clinic is trying to reach him. So, advised him to check his VM to figure this out. Pt agrees.     No other questions or concerns from pt at this time.    Alf COLON  Clinic RN  [facility]

## 2025-04-10 ENCOUNTER — OFFICE VISIT (OUTPATIENT)
Dept: CARDIOLOGY | Facility: CLINIC | Age: 65
End: 2025-04-10
Payer: MEDICARE

## 2025-04-10 VITALS
HEIGHT: 65 IN | BODY MASS INDEX: 39.44 KG/M2 | HEART RATE: 74 BPM | OXYGEN SATURATION: 97 % | WEIGHT: 236.7 LBS | DIASTOLIC BLOOD PRESSURE: 64 MMHG | SYSTOLIC BLOOD PRESSURE: 126 MMHG

## 2025-04-10 DIAGNOSIS — Z79.4 TYPE 2 DIABETES MELLITUS WITH HYPEROSMOLARITY WITHOUT COMA, WITH LONG-TERM CURRENT USE OF INSULIN (H): ICD-10-CM

## 2025-04-10 DIAGNOSIS — I25.119 CORONARY ARTERY DISEASE INVOLVING NATIVE CORONARY ARTERY OF NATIVE HEART WITH ANGINA PECTORIS: ICD-10-CM

## 2025-04-10 DIAGNOSIS — E66.01 MORBID OBESITY (H): Primary | ICD-10-CM

## 2025-04-10 DIAGNOSIS — E11.00 TYPE 2 DIABETES MELLITUS WITH HYPEROSMOLARITY WITHOUT COMA, WITH LONG-TERM CURRENT USE OF INSULIN (H): ICD-10-CM

## 2025-04-10 NOTE — LETTER
4/10/2025    Physician No Ref-Primary  No address on file    RE: Steve Morgan       Dear Colleague,     I had the pleasure of seeing Steve Morgan in the [facility].  CARDIOLOGY CLINIC FOLLOW-UP NOTE      REASON FOR VISIT:   Multivessel CAD s/p RCA/RPDA stenting in 1/2025, with significant residual disease (mostly distal/small vessel)     PRIMARY CARE PHYSICIAN:  Physician No Ref-Primary        History of Present Illness  Steve Morgan is an extremely pleasant 65 year old male here for routine follow-up.  Medical history is notable for multivessel CAD s/p RCA/RPDA PCI in 1/2025, with significant residual disease (mostly distal/small vessel), as well as hypertension, dyslipidemia, IDDM 2, CKD 3, chronic anemia, and neuropathy/hand contractures.  He is a never smoker.    Since his 1/2025 PCI, he has overall done well without any obvious anginal symptoms.  However, he does tell me that just yesterday he did have his first recurrence of chest discomfort since his PCI.  He climbed the stairs from the basement, walked out to the end of the driveway, moved his garbage cans back-and-forth, walked back, and then walked down the stairs again, and by the time he stopped he did have similar discomfort to the chest pain that he felt prior to his PCI.  This lasted for 2-3 minutes and went away on its own.  No recurrence since then.  He does note that this is about the most active thing that he has done since his PCI.  He has not yet gotten back into cardiac rehab as he unfortunately had multiple issues such as his father passing away, personally having a bad cold in late February, etc.    As part of today's visit, I reviewed his most recent lipid panel, chemistry panel, CBC, A1c, dobutamine stress echo, and the images of his 1/2025 diagnostic angiogram and RCA PCI.  No acute ischemic changes on today's ECG.      Assessment & Plan    Multivessel CAD s/p RCA/RPDA PCI in 1/2025  Significant residual CAD  (mostly distal/small vessel), with probably CCS 1 angina  Single episode of typical angina yesterday following significant exertion, without recurrence  Hypertension, well controlled  Dyslipidemia, with well controlled LDL  IDDM 2  CKD 3  Chronic anemia  Neuropathy/hand contractures  Never smoker      It was a pleasure to speak with Caio in clinic today.  We discussed the potential causes of his symptoms, and while this could represent an episode of unstable angina, I think it is more likely that he simply reached the threshold for the stable angina that he likely will always have to some extent.  Given his extensive distal vessel disease, there will always be a portion of coronary disease that cannot be revascularized.  I explained that our goal is to make sure that we can get his angina threshold increased to the point where he can do everything that he wants to do on a daily basis without being limited by angina.  At this time, his blood pressure is on the lower side, so I will hold off on uptitrating his antianginal medication.  Instead, I am glad to hear that he did enroll in cardiac rehab and will be starting this soon.  We will want to see if he can gradually push through some of his symptoms and build up his aerobic tolerance.  If he is able to do this, and eventually can improve this to the point where he is able to do all of his daily activities without any limitation, then I would not encourage further PCI.  Alternatively, though, if he is feeling limited, then I think it would be worth bringing him back for repeat PCI, at least of the LCx/OM (and we could also consider stenting a short portion of his proximal LAD as well as his mid-distal RPDA).  In the absence of limiting symptoms, however, I would not pursue any additional PCI.  Lastly, we did discuss that if his anginal symptoms begin to accelerate, particularly if they start occurring at rest, this may be a sign of unstable angina and he should let  us know immediately or call 911/present to the ER for severe symptoms.      Follow-up: 2-3 months with [facility] to check-in on symptoms.  If these are not improving and he is still limited by angina, we will have him return to the Cath Lab for staged PCI.          On the date of the patient's visit, I spent a total of 41 minutes reviewing the patient's chart; interviewing, examining, and counseling the patient; coordinating with other providers as necessary, entering orders, and documenting in the medical chart.      Magan Gallardo MD  Interventional Cardiology  April 10, 2025      The longitudinal plan of care for the diagnosis(es)/condition(s) as documented were addressed during this visit. Due to the added complexity in care, I will continue to support Caio in the subsequent management and with ongoing continuity of care.        Medications  Current Outpatient Medications   Medication Sig Dispense Refill     acetaminophen (TYLENOL) 325 MG tablet Take 2 tablets (650 mg) by mouth every 4 hours as needed for other (For optimal non-opioid multimodal pain management to improve pain control.)       amLODIPine (NORVASC) 5 MG tablet Take 1 tablet by mouth daily at 2 pm.       aspirin 81 MG EC tablet Take 1 tablet (81 mg) by mouth daily. 30 tablet 3     Calcium Carb-Cholecalciferol (CALCIUM 600 + D PO) Take 1 tablet by mouth daily       Continuous Glucose Sensor (FREESTYLE GIULIANA 2 SENSOR) MISC Inject 1 each subcutaneously every 14 days. Use 1 sensor every 14 days. Use to read blood sugars per 's instructions. 6 each 0     finasteride (PROSCAR) 5 MG tablet Take 1 tablet (5 mg) by mouth daily. 90 tablet 1     gabapentin (NEURONTIN) 300 MG capsule Take 1 capsule (300 mg) by mouth 3 times daily (Patient taking differently: Take 300 mg by mouth 3 times daily as needed.) 270 capsule 1     glipiZIDE (GLUCOTROL XL) 10 MG 24 hr tablet Take 10 mg by mouth daily.       insulin aspart (NOVOLOG PEN) 100 UNIT/ML pen 14-18  units before each meal plus a sliding scale of 1:50> 150. Est TDD: 75 units/day 15 mL 1     insulin glargine 100 UNIT/ML pen Inject 66 Units subcutaneously daily. DOSE CHANGE do Not fill unless patient requests 30 mL 2     insulin pen needle (B-D U/F) 31G X 5 MM miscellaneous USE 1 DAILY OR AS DIRECTED. 100 each 3     isosorbide mononitrate (IMDUR) 60 MG 24 hr tablet Take 1 tablet (60 mg) by mouth daily. DO NOT CRUSH. Can split tablet in half along score herve. 90 tablet 3     latanoprost (XALATAN) 0.005 % ophthalmic solution INSTILL 1 DROP INTO BOTH EYES IN THE EVENING       LEVOXYL 137 MCG tablet Take 1 tablet (137 mcg) by mouth daily. 90 tablet 3     metoprolol tartrate (LOPRESSOR) 25 MG tablet Take 0.5 tablets (12.5 mg) by mouth 2 times daily. 90 tablet 3     mirtazapine (REMERON) 45 MG tablet Take 1 tablet (45 mg) by mouth at bedtime. 90 tablet 0     MULTI-VITAMIN OR TABS Take 1 tablet by mouth daily 30 0     nitroGLYcerin (NITROSTAT) 0.4 MG sublingual tablet For chest pain place 1 tablet under the tongue every 5 minutes for 3 doses. If symptoms persist 5 minutes after 1st dose call 911. 30 tablet 11     prasugrel (EFFIENT) 10 MG TABS tablet Take 1 tablet (10 mg) by mouth daily. Do not crush or break tablet. Dose to start tomorrow. 90 tablet 3     rosuvastatin (CRESTOR) 40 MG tablet Take 1 tablet (40 mg) by mouth daily. 90 tablet 3     senna-docusate (SENOKOT-S/PERICOLACE) 8.6-50 MG tablet Take 1 tablet by mouth 2 times daily as needed for constipation 20 tablet 0     sodium bicarbonate 325 MG tablet Take 325 mg by mouth 2 times daily.       vilazodone (VIIBRYD) 20 MG TABS tablet Take 1 tablet (20 mg) by mouth daily. 90 tablet 0     No current facility-administered medications for this visit.     Allergies  No Known Allergies      Physical Exam      BP: 126/64 Pulse: 74     SpO2: 97 %      Vital Signs with Ranges  Pulse:  [74] 74  BP: (126)/(64) 126/64  SpO2:  [97 %] 97 %  236 lbs 11.2 oz    Constitutional:  Well-appearing, no acute distress, bilateral hand contractures noted  Respiratory: Normal respiratory effort, CTAB  Cardiovascular: RRR, no m/r/g.  JVP < 7 cm H2O.  There is trace bilateral LE edema.  Normal carotid upstrokes, no carotid bruits.      Thank you for allowing me to participate in the care of your patient.      Sincerely,     Magan Gallardo MD     [facility]  cc:   Jacqueline Holloway PA-C  [id number] [id number]

## 2025-04-10 NOTE — PROGRESS NOTES
CARDIOLOGY CLINIC FOLLOW-UP NOTE      REASON FOR VISIT:   Multivessel CAD s/p RCA/RPDA stenting in 1/2025, with significant residual disease (mostly distal/small vessel)     PRIMARY CARE PHYSICIAN:  Physician No Ref-Primary        History of Present Illness   Steve Morgan is an extremely pleasant 65 year old male here for routine follow-up.  Medical history is notable for multivessel CAD s/p RCA/RPDA PCI in 1/2025, with significant residual disease (mostly distal/small vessel), as well as hypertension, dyslipidemia, IDDM 2, CKD 3, chronic anemia, and neuropathy/hand contractures.  He is a never smoker.    Since his 1/2025 PCI, he has overall done well without any obvious anginal symptoms.  However, he does tell me that just yesterday he did have his first recurrence of chest discomfort since his PCI.  He climbed the stairs from the basement, walked out to the end of the driveway, moved his garbage cans back-and-forth, walked back, and then walked down the stairs again, and by the time he stopped he did have similar discomfort to the chest pain that he felt prior to his PCI.  This lasted for 2-3 minutes and went away on its own.  No recurrence since then.  He does note that this is about the most active thing that he has done since his PCI.  He has not yet gotten back into cardiac rehab as he unfortunately had multiple issues such as his father passing away, personally having a bad cold in late February, etc.    As part of today's visit, I reviewed his most recent lipid panel, chemistry panel, CBC, A1c, dobutamine stress echo, and the images of his 1/2025 diagnostic angiogram and RCA PCI.  No acute ischemic changes on today's ECG.      Assessment & Plan     Multivessel CAD s/p RCA/RPDA PCI in 1/2025  Significant residual CAD (mostly distal/small vessel), with probably CCS 1 angina  Single episode of typical angina yesterday following significant exertion, without recurrence  Hypertension, well  controlled  Dyslipidemia, with well controlled LDL  IDDM 2  CKD 3  Chronic anemia  Neuropathy/hand contractures  Never smoker      It was a pleasure to speak with Caio in clinic today.  We discussed the potential causes of his symptoms, and while this could represent an episode of unstable angina, I think it is more likely that he simply reached the threshold for the stable angina that he likely will always have to some extent.  Given his extensive distal vessel disease, there will always be a portion of coronary disease that cannot be revascularized.  I explained that our goal is to make sure that we can get his angina threshold increased to the point where he can do everything that he wants to do on a daily basis without being limited by angina.  At this time, his blood pressure is on the lower side, so I will hold off on uptitrating his antianginal medication.  Instead, I am glad to hear that he did enroll in cardiac rehab and will be starting this soon.  We will want to see if he can gradually push through some of his symptoms and build up his aerobic tolerance.  If he is able to do this, and eventually can improve this to the point where he is able to do all of his daily activities without any limitation, then I would not encourage further PCI.  Alternatively, though, if he is feeling limited, then I think it would be worth bringing him back for repeat PCI, at least of the LCx/OM (and we could also consider stenting a short portion of his proximal LAD as well as his mid-distal RPDA).  In the absence of limiting symptoms, however, I would not pursue any additional PCI.  Lastly, we did discuss that if his anginal symptoms begin to accelerate, particularly if they start occurring at rest, this may be a sign of unstable angina and he should let us know immediately or call 911/present to the ER for severe symptoms.      Follow-up: 2-3 months with [facility] to check-in on symptoms.  If these are not improving and he is  still limited by angina, we will have him return to the Cath Lab for staged PCI.          On the date of the patient's visit, I spent a total of 41 minutes reviewing the patient's chart; interviewing, examining, and counseling the patient; coordinating with other providers as necessary, entering orders, and documenting in the medical chart.      Magan Gallardo MD  Interventional Cardiology  April 10, 2025      The longitudinal plan of care for the diagnosis(es)/condition(s) as documented were addressed during this visit. Due to the added complexity in care, I will continue to support Caio in the subsequent management and with ongoing continuity of care.        Medications   Current Outpatient Medications   Medication Sig Dispense Refill    acetaminophen (TYLENOL) 325 MG tablet Take 2 tablets (650 mg) by mouth every 4 hours as needed for other (For optimal non-opioid multimodal pain management to improve pain control.)      amLODIPine (NORVASC) 5 MG tablet Take 1 tablet by mouth daily at 2 pm.      aspirin 81 MG EC tablet Take 1 tablet (81 mg) by mouth daily. 30 tablet 3    Calcium Carb-Cholecalciferol (CALCIUM 600 + D PO) Take 1 tablet by mouth daily      Continuous Glucose Sensor (FREESTYLE GIULIANA 2 SENSOR) MISC Inject 1 each subcutaneously every 14 days. Use 1 sensor every 14 days. Use to read blood sugars per 's instructions. 6 each 0    finasteride (PROSCAR) 5 MG tablet Take 1 tablet (5 mg) by mouth daily. 90 tablet 1    gabapentin (NEURONTIN) 300 MG capsule Take 1 capsule (300 mg) by mouth 3 times daily (Patient taking differently: Take 300 mg by mouth 3 times daily as needed.) 270 capsule 1    glipiZIDE (GLUCOTROL XL) 10 MG 24 hr tablet Take 10 mg by mouth daily.      insulin aspart (NOVOLOG PEN) 100 UNIT/ML pen 14-18 units before each meal plus a sliding scale of 1:50> 150. Est TDD: 75 units/day 15 mL 1    insulin glargine 100 UNIT/ML pen Inject 66 Units subcutaneously daily. DOSE CHANGE do  Not fill unless patient requests 30 mL 2    insulin pen needle (B-D U/F) 31G X 5 MM miscellaneous USE 1 DAILY OR AS DIRECTED. 100 each 3    isosorbide mononitrate (IMDUR) 60 MG 24 hr tablet Take 1 tablet (60 mg) by mouth daily. DO NOT CRUSH. Can split tablet in half along score herve. 90 tablet 3    latanoprost (XALATAN) 0.005 % ophthalmic solution INSTILL 1 DROP INTO BOTH EYES IN THE EVENING      LEVOXYL 137 MCG tablet Take 1 tablet (137 mcg) by mouth daily. 90 tablet 3    metoprolol tartrate (LOPRESSOR) 25 MG tablet Take 0.5 tablets (12.5 mg) by mouth 2 times daily. 90 tablet 3    mirtazapine (REMERON) 45 MG tablet Take 1 tablet (45 mg) by mouth at bedtime. 90 tablet 0    MULTI-VITAMIN OR TABS Take 1 tablet by mouth daily 30 0    nitroGLYcerin (NITROSTAT) 0.4 MG sublingual tablet For chest pain place 1 tablet under the tongue every 5 minutes for 3 doses. If symptoms persist 5 minutes after 1st dose call 911. 30 tablet 11    prasugrel (EFFIENT) 10 MG TABS tablet Take 1 tablet (10 mg) by mouth daily. Do not crush or break tablet. Dose to start tomorrow. 90 tablet 3    rosuvastatin (CRESTOR) 40 MG tablet Take 1 tablet (40 mg) by mouth daily. 90 tablet 3    senna-docusate (SENOKOT-S/PERICOLACE) 8.6-50 MG tablet Take 1 tablet by mouth 2 times daily as needed for constipation 20 tablet 0    sodium bicarbonate 325 MG tablet Take 325 mg by mouth 2 times daily.      vilazodone (VIIBRYD) 20 MG TABS tablet Take 1 tablet (20 mg) by mouth daily. 90 tablet 0     No current facility-administered medications for this visit.     Allergies   No Known Allergies      Physical Exam       BP: 126/64 Pulse: 74     SpO2: 97 %      Vital Signs with Ranges  Pulse:  [74] 74  BP: (126)/(64) 126/64  SpO2:  [97 %] 97 %  236 lbs 11.2 oz    Constitutional: Well-appearing, no acute distress, bilateral hand contractures noted  Respiratory: Normal respiratory effort, CTAB  Cardiovascular: RRR, no m/r/g.  JVP < 7 cm H2O.  There is trace bilateral LE  edema.  Normal carotid upstrokes, no carotid bruits.

## 2025-04-13 ENCOUNTER — MYC MEDICAL ADVICE (OUTPATIENT)
Dept: EDUCATION SERVICES | Facility: CLINIC | Age: 65
End: 2025-04-13
Payer: MEDICARE

## 2025-04-14 NOTE — TELEPHONE ENCOUNTER
Diabetes Follow-up    Subjective/Objective:    Steve MONY Morgan requests review of glucose and monitoring report.   Comments/concerns: see mychart    Diabetes is being managed with Diabetes Medications   Diabetes Medication(s)       Insulin       insulin aspart (NOVOLOG PEN) 100 UNIT/ML pen 14-18 units before each meal plus a sliding scale of 1:50> 150. Est TDD: 75 units/day     insulin glargine 100 UNIT/ML pen Inject 66 Units subcutaneously daily. DOSE CHANGE do Not fill unless patient requests       Sulfonylureas       glipiZIDE (GLUCOTROL XL) 10 MG 24 hr tablet Take 10 mg by mouth daily.            Report:       Assessment/Plan:  Daytime low-normal range with potential for hypoglycemia, evening and after dinner hyperglycemia. Recommend he take the meal dose of Novolog at breakfast and lunch but do not add the correction dose, and increase the dinner dose without correction.  He can correct at bedtime if needed.       Lantus( insulin glargine): -continue 60 units daily  Novolog:    - Breakfast: continue 16 units before the meal,    - Lunch: continue 14 units before the meal, or take 8 units if you are only having a snack with carbohydrates.   - Dinner: increase to 20 units before the meal, if after 3 days (Thursday) you continue to see high blood sugars after dinner then increase to 22 units.     Continue the correction dose at bedtime.     Megan LOWEN, RN, PHN, CDCES     Any diabetes medication dose changes were made via the CDE Protocol and Collaborative Practice Agreement with the patient's referring provider. A copy of this encounter was shared with the provider.

## 2025-04-19 DIAGNOSIS — Z79.4 TYPE 2 DIABETES MELLITUS WITH DIABETIC NEUROPATHY, WITH LONG-TERM CURRENT USE OF INSULIN (H): ICD-10-CM

## 2025-04-19 DIAGNOSIS — E11.40 TYPE 2 DIABETES MELLITUS WITH DIABETIC NEUROPATHY, WITH LONG-TERM CURRENT USE OF INSULIN (H): ICD-10-CM

## 2025-04-20 ENCOUNTER — MYC MEDICAL ADVICE (OUTPATIENT)
Dept: EDUCATION SERVICES | Facility: CLINIC | Age: 65
End: 2025-04-20
Payer: MEDICARE

## 2025-04-20 DIAGNOSIS — N18.30 STAGE 3 CHRONIC KIDNEY DISEASE, UNSPECIFIED WHETHER STAGE 3A OR 3B CKD (H): ICD-10-CM

## 2025-04-20 DIAGNOSIS — E11.40 TYPE 2 DIABETES MELLITUS WITH DIABETIC NEUROPATHY, WITHOUT LONG-TERM CURRENT USE OF INSULIN (H): Primary | ICD-10-CM

## 2025-04-21 RX ORDER — INSULIN GLARGINE AND LIXISENATIDE 100; 33 U/ML; UG/ML
INJECTION, SOLUTION SUBCUTANEOUS
Status: CANCELLED | OUTPATIENT
Start: 2025-04-21

## 2025-04-21 NOTE — TELEPHONE ENCOUNTER
Diabetes Follow-up    Subjective/Objective:    Steve SYKES Cathy requests review of glucose and monitoring report.   Comments/concerns: see mychart message    Diabetes is being managed with Diabetes Medications   Diabetes Medication(s)       Insulin       insulin aspart (NOVOLOG PEN) 100 UNIT/ML pen 14-18 units before each meal plus a sliding scale of 1:50> 150. Est TDD: 75 units/day     insulin glargine 100 UNIT/ML pen Inject 66 Units subcutaneously daily. DOSE CHANGE do Not fill unless patient requests       Sulfonylureas       glipiZIDE (GLUCOTROL XL) 10 MG 24 hr tablet Take 10 mg by mouth daily.            Report:       Assessment/Plan:  Called patient to review glucose, glucose is still consistently above goal. Patient  states he is consistently taking his medication, other than a couple of missed doses. He has been getting regular iron sucrose infusions for his CKD, the last infusion was on 4/7/25, glucose pattern was lower after the infusion, but is high again. Patient would benefit from a MTM review his medications and. Patient is interested in meeting with MTM, referral placed.     Megan CANNON, RN, PHN, CDCES     Any diabetes medication dose changes were made via the CDE Protocol and Collaborative Practice Agreement with the patient's referring provider. A copy of this encounter was shared with the provider.

## 2025-04-21 NOTE — TELEPHONE ENCOUNTER
Requested Prescriptions   Pending Prescriptions Disp Refills    Continuous Glucose Sensor (FREESTYLE GIULIANA 2 SENSOR) MISC [Pharmacy Med Name: FREESTYLE GIULIANA 2 SENSOR]  1     Sig: INJECT 1 EACH SUBCUTANEOUSLY EVERY 14 DAYS TO READ BLOOD SUGARS PER 'S INSTRUCTIONS.       There is no refill protocol information for this order

## 2025-04-23 ENCOUNTER — TELEPHONE (OUTPATIENT)
Dept: PHARMACY | Facility: OTHER | Age: 65
End: 2025-04-23
Payer: MEDICARE

## 2025-04-23 NOTE — TELEPHONE ENCOUNTER
MTM referral from: [facility] clinic visit (referral by provider)    MTM referral outreach attempt #2 on April 23, 2025 at 10:30 AM      Outcome: Left Message    Use vbc for the carrier/Plan on the flowsheet      [employer] Message Sent    Bekah Galeano CMA  MTM

## 2025-04-28 ENCOUNTER — HOSPITAL ENCOUNTER (OUTPATIENT)
Dept: CARDIAC REHAB | Facility: CLINIC | Age: 65
Discharge: HOME | End: 2025-04-28
Attending: PHYSICIAN ASSISTANT
Payer: MEDICARE

## 2025-04-28 DIAGNOSIS — Z95.5 S/P CORONARY ARTERY STENT PLACEMENT: ICD-10-CM

## 2025-04-28 DIAGNOSIS — I21.4 NSTEMI (NON-ST ELEVATED MYOCARDIAL INFARCTION) (H): ICD-10-CM

## 2025-04-28 PROCEDURE — 93798 PHYS/QHP OP CAR RHAB W/ECG: CPT

## 2025-04-28 PROCEDURE — 93797 PHYS/QHP OP CAR RHAB WO ECG: CPT

## 2025-04-30 SDOH — HEALTH STABILITY: PHYSICAL HEALTH: ON AVERAGE, HOW MANY MINUTES DO YOU ENGAGE IN EXERCISE AT THIS LEVEL?: 0 MIN

## 2025-04-30 SDOH — HEALTH STABILITY: PHYSICAL HEALTH: ON AVERAGE, HOW MANY DAYS PER WEEK DO YOU ENGAGE IN MODERATE TO STRENUOUS EXERCISE (LIKE A BRISK WALK)?: 0 DAYS

## 2025-04-30 ASSESSMENT — PATIENT HEALTH QUESTIONNAIRE - PHQ9
10. IF YOU CHECKED OFF ANY PROBLEMS, HOW DIFFICULT HAVE THESE PROBLEMS MADE IT FOR YOU TO DO YOUR WORK, TAKE CARE OF THINGS AT HOME, OR GET ALONG WITH OTHER PEOPLE: SOMEWHAT DIFFICULT
SUM OF ALL RESPONSES TO PHQ QUESTIONS 1-9: 9
SUM OF ALL RESPONSES TO PHQ QUESTIONS 1-9: 9

## 2025-04-30 ASSESSMENT — SOCIAL DETERMINANTS OF HEALTH (SDOH): HOW OFTEN DO YOU GET TOGETHER WITH FRIENDS OR RELATIVES?: NEVER

## 2025-05-01 ENCOUNTER — ALLIED HEALTH/NURSE VISIT (OUTPATIENT)
Dept: EDUCATION SERVICES | Facility: CLINIC | Age: 65
End: 2025-05-01
Payer: MEDICARE

## 2025-05-01 ENCOUNTER — OFFICE VISIT (OUTPATIENT)
Dept: FAMILY MEDICINE | Facility: CLINIC | Age: 65
End: 2025-05-01
Attending: NURSE PRACTITIONER
Payer: MEDICARE

## 2025-05-01 VITALS
HEIGHT: 65 IN | BODY MASS INDEX: 39.82 KG/M2 | OXYGEN SATURATION: 98 % | DIASTOLIC BLOOD PRESSURE: 60 MMHG | WEIGHT: 239 LBS | HEART RATE: 64 BPM | TEMPERATURE: 98.2 F | SYSTOLIC BLOOD PRESSURE: 130 MMHG | RESPIRATION RATE: 15 BRPM

## 2025-05-01 DIAGNOSIS — F33.1 MAJOR DEPRESSIVE DISORDER, RECURRENT EPISODE, MODERATE (H): ICD-10-CM

## 2025-05-01 DIAGNOSIS — E11.40 TYPE 2 DIABETES MELLITUS WITH DIABETIC NEUROPATHY, WITHOUT LONG-TERM CURRENT USE OF INSULIN (H): Primary | ICD-10-CM

## 2025-05-01 DIAGNOSIS — Z23 NEED FOR COVID-19 VACCINE: ICD-10-CM

## 2025-05-01 DIAGNOSIS — R19.7 DIARRHEA, UNSPECIFIED TYPE: ICD-10-CM

## 2025-05-01 DIAGNOSIS — D63.1 ANEMIA OF CHRONIC RENAL FAILURE, STAGE 3B (H): ICD-10-CM

## 2025-05-01 DIAGNOSIS — I21.4 NSTEMI (NON-ST ELEVATED MYOCARDIAL INFARCTION) (H): Primary | ICD-10-CM

## 2025-05-01 DIAGNOSIS — N18.32 ANEMIA OF CHRONIC RENAL FAILURE, STAGE 3B (H): ICD-10-CM

## 2025-05-01 NOTE — LETTER
5/1/2025         RE: Steve Morgan  [id number]  [facility] [address]        Dear Colleague,    Thank you for referring your patient, Steve Morgan, to the [facility]. Please see a copy of my visit note below.    Diabetes Self-Management Education & Support    Presents for: Follow-up    Type of Service: In Person Visit  Covisit with MTM      Assessment  Patient states he needs to revisit healthy eating, he struggles with portions.      Discussed diabetes diet, provided meal plans and snack ideas, and reviewed plate planner.  MTM is adjusted medications    Based on learning assessment above, most appropriate setting for further diabetes education would be: Individual setting.    Care Plan and Education Provided:  Healthy Eating: Balanced meals, Consistency in amount and timing of carbohydrate intake, Plate planning method, Portion control.    Patient verbalized understanding of diabetes self-management education concepts discussed, opportunities for ongoing education and support, and recommendations provided today.    Plan  Watch your portions at meals, refer to the plate planner for balance and portion, refer to the meal plan and snack ideas    Follow medication instructions provided by Elizabeth (MT)    Send an update in 2 weeks with how you are doing     Follow up: with MTM for furthe medication adjustments.    See Care Plan for co-developed, patient-state behavior change goals.    Education Materials Provided:  45 grams carbohydrate meal plans, snack ideas, and a list of non-starchy vegetables      Subjective/Objective  Caio is an 65 year old, presenting for the following diabetes education related to: Follow-up  Accompanied by: Self  Diabetes education in the past 24mo: (Patient-Rptd) No  Focus of Visit: Healthy Eating, Taking Medication  Diabetes type: (Patient-Rptd) Type 2  Disease course: (Patient-Rptd) Worsening  How confident are you filling out medical forms by  "yourself:: (Patient-Rptd) Extremely  Cultural Influences/Ethnic Background:  Not  or     Diabetes Symptoms & Complications:  Diabetes Related Symptoms: (Patient-Rptd) None  Weight trend: (Patient-Rptd) Fluctuating  Symptom course: (Patient-Rptd) Worsening  Disease course: (Patient-Rptd) Worsening       Patient Problem List and Family Medical History reviewed for relevant medical history, current medical status, and diabetes risk factors.    Vitals:  There were no vitals taken for this visit.  Estimated body mass index is 39.77 kg/m  as calculated from the following:    Height as of an earlier encounter on 5/1/25: 1.651 m (5' 5\").    Weight as of an earlier encounter on 5/1/25: 108.4 kg (239 lb).   Last 3 BP:   BP Readings from Last 3 Encounters:   05/01/25 130/60   04/10/25 126/64   04/07/25 (!) 152/67       History   Smoking Status     Never   Smokeless Tobacco     Never       Labs:  Lab Results   Component Value Date    A1C 9.9 03/28/2025    A1C 7.4 02/22/2021     Lab Results   Component Value Date     03/28/2025     01/15/2025     09/24/2021     01/16/2020     Lab Results   Component Value Date    LDL 74 04/11/2025    LDL 88 03/05/2020     HDL Cholesterol   Date Value Ref Range Status   03/05/2020 33 (L) >39 mg/dL Final     Direct Measure HDL   Date Value Ref Range Status   04/11/2025 30 (L) >=40 mg/dL Final     GFR Estimate   Date Value Ref Range Status   03/28/2025 42 (L) >60 mL/min/1.73m2 Final     Comment:     eGFR calculated using 2021 CKD-EPI equation.   01/16/2020 79 >60 mL/min/[1.73_m2] Final     Comment:     Non  GFR Calc  Starting 12/18/2018, serum creatinine based estimated GFR (eGFR) will be   calculated using the Chronic Kidney Disease Epidemiology Collaboration   (CKD-EPI) equation.       GFR Estimate If Black   Date Value Ref Range Status   01/16/2020 >90 >60 mL/min/[1.73_m2] Final     Comment:      GFR Calc  Starting " 12/18/2018, serum creatinine based estimated GFR (eGFR) will be   calculated using the Chronic Kidney Disease Epidemiology Collaboration   (CKD-EPI) equation.       Lab Results   Component Value Date    CR 1.77 03/28/2025    CR 1.01 01/16/2020     Lab Results   Component Value Date    MICROL 57.3 05/16/2024    UMALCR 151.99 (H) 05/16/2024    UCRR 37.7 05/16/2024 5/1/2025   Healthy Eating   Healthy Eating Assessed Today Yes   Cultural/Zoroastrianism diet restrictions? No   How many times a week on average do you eat food made away from home (restaurant/take-out)? 1   Meals include Breakfast;Lunch;Dinner;Evening Snack   Breakfast cereal ( shredded wheat, cheerios with splenda), milk, yogurt(light and fit) Or eggs   Lunch skip or ham sandwich or cheese and crackers   Dinner [place] boiled dinner- (beef bone broth and vegetables) OR meatloaf, mashed potatoes,   Snacks BBQ chips, pretzels, salty snacks, sweets- cookies, cake, candy   Beverages Water;Milk   Has patient met with a dietitian in the past? Yes       Megan LOWEN, RN, PHN, [facility]   Time Spent: 20 minutes  Encounter Type: Individual    Any diabetes medication dose changes were made via the [facility] Standing Orders under the patient's referring provider.

## 2025-05-01 NOTE — PROGRESS NOTES
"  Assessment & Plan     NSTEMI (non-ST elevated myocardial infarction) (H)  Continue with cardiac rehab and cardiology. Restarted ASA today per MTM recommendations prior to todays visit.    Anemia of chronic renal failure, stage 3b (H)  - Albumin Random Urine Quantitative with Creat Ratio; Future    Major depressive disorder, recurrent episode, moderate (H)  Stable in viibryd and mirtazapine  - Graduation to Primary Care  - CCPS must complete    Need for COVID-19 vaccine  - COVID-19 12+ (PFIZER)    Diarrhea, unspecified type  Pt has upcoming EGD and colonoscopy. Will follow up pending results at his [facility] in about a month      BMI  Estimated body mass index is 39.77 kg/m  as calculated from the following:    Height as of this encounter: 1.651 m (5' 5\").    Weight as of this encounter: 108.4 kg (239 lb).           Odalis Kearney is a 65 year old, presenting for the following health issues:  Medication Change        5/1/2025     2:06 PM   Additional Questions   Roomed by Jeanna CHAVARRIA     History of Present Illness       Reason for visit:  Get established with a new care provider since Lisa Mcdonough exercises with enough effort to increase his heart rate 9 or less minutes per day.  He exercises with enough effort to increase his heart rate 3 or less days per week. He is missing 2 dose(s) of medications per week.  He is not taking prescribed medications regularly due to remembering to take.        Diabetes Follow-up    How often are you checking your blood sugar? Four or more times daily  What time of day are you checking your blood sugars (select all that apply)?  Before and after meals  Have you had any blood sugars above 200?  Yes   Have you had any blood sugars below 70?  Yes   What symptoms do you notice when your blood sugar is low?  Dizzy and Confusion  What concerns do you have today about your diabetes? None   Do you have any of these symptoms? (Select all that apply)  Numbness in feet Neuropathy " "        Hyperlipidemia Follow-Up    Are you regularly taking any medication or supplement to lower your cholesterol?   rosuvastatin (CRESTOR) 40 MG tablet   Are you having muscle aches or other side effects that you think could be caused by your cholesterol lowering medication?  No    Hypertension Follow-up    Do you check your blood pressure regularly outside of the clinic? No   Are you following a low salt diet? Yes  Are your blood pressures ever more than 140 on the top number (systolic) OR more   than 90 on the bottom number (diastolic), for example 140/90? N/A    Just met with MTM and DM ed prior to visit     Wife has been helping him at home    Had NSTEMI in January   - Cardiac Issues: Experienced \"mini heart attacks\" characterized by breathlessness and retching, which eventually resolved on their own. Missed cardiac rehab sessions due to vacation and illness, but has a new referral to restart again soon    - Gastrointestinal Issues: Experiencing significant gas and diarrhea, with symptoms persisting for two to three years. Family history of celiac disease, raising concerns about similar symptoms. Previously on metformin, which may have contributed to diarrhea. Scheduled for a colonoscopy and endoscopy to investigate gastrointestinal issues.          Going to start cardiac rehab     Seeing Rheumatology for hands    Nephrology - CKD           BP Readings from Last 2 Encounters:   05/01/25 130/60   04/10/25 126/64     Hemoglobin A1C (%)   Date Value   03/28/2025 9.9 (H)   01/06/2025 7.2 (H)   02/22/2021 7.4 (H)   03/12/2020 9.9 (H)     LDL Cholesterol Calculated (mg/dL)   Date Value   04/11/2025 74   01/15/2025 51   03/05/2020 88   01/16/2020 97               Objective    /60 (BP Location: Right arm, Patient Position: Sitting, Cuff Size: Adult Large)   Pulse 64   Temp 98.2  F (36.8  C) (Temporal)   Resp 15   Ht 1.651 m (5' 5\")   Wt 108.4 kg (239 lb)   SpO2 98%   BMI 39.77 kg/m    Body mass index is " 39.77 kg/m .  Physical Exam   GENERAL: alert and no distress  RESP: lungs clear to auscultation - no rales, rhonchi or wheezes  CV: regular rate and rhythm, normal S1 S2, no S3 or S4, no murmur, click or rub, no peripheral edema  PSYCH: mentation appears normal, affect normal/bright            Signed Electronically by: BRIDGET Cardoza CNP

## 2025-05-01 NOTE — PROGRESS NOTES
Diabetes Self-Management Education & Support    Presents for: Follow-up    Type of Service: In Person Visit  Covisit with MTM      Assessment  Patient states he needs to revisit healthy eating, he struggles with portions.      Discussed diabetes diet, provided meal plans and snack ideas, and reviewed plate planner.  MTM is adjusted medications    Based on learning assessment above, most appropriate setting for further diabetes education would be: Individual setting.    Care Plan and Education Provided:  Healthy Eating: Balanced meals, Consistency in amount and timing of carbohydrate intake, Plate planning method, Portion control.    Patient verbalized understanding of diabetes self-management education concepts discussed, opportunities for ongoing education and support, and recommendations provided today.    Plan  Watch your portions at meals, refer to the plate planner for balance and portion, refer to the meal plan and snack ideas    Follow medication instructions provided by Elizabeth (MTM)    Send an update in 2 weeks with how you are doing     Follow up: with MTM for furthe medication adjustments.    See Care Plan for co-developed, patient-state behavior change goals.    Education Materials Provided:  45 grams carbohydrate meal plans, snack ideas, and a list of non-starchy vegetables      Subjective/Objective  Caio is an 65 year old, presenting for the following diabetes education related to: Follow-up  Accompanied by: Self  Diabetes education in the past 24mo: (Patient-Rptd) No  Focus of Visit: Healthy Eating, Taking Medication  Diabetes type: (Patient-Rptd) Type 2  Disease course: (Patient-Rptd) Worsening  How confident are you filling out medical forms by yourself:: (Patient-Rptd) Extremely  Cultural Influences/Ethnic Background:  Not  or     Diabetes Symptoms & Complications:  Diabetes Related Symptoms: (Patient-Rptd) None  Weight trend: (Patient-Rptd) Fluctuating  Symptom course: (Patient-Rptd)  "Worsening  Disease course: (Patient-Rptd) Worsening       Patient Problem List and Family Medical History reviewed for relevant medical history, current medical status, and diabetes risk factors.    Vitals:  There were no vitals taken for this visit.  Estimated body mass index is 39.77 kg/m  as calculated from the following:    Height as of an earlier encounter on 5/1/25: 1.651 m (5' 5\").    Weight as of an earlier encounter on 5/1/25: 108.4 kg (239 lb).   Last 3 BP:   BP Readings from Last 3 Encounters:   05/01/25 130/60   04/10/25 126/64   04/07/25 (!) 152/67       History   Smoking Status    Never   Smokeless Tobacco    Never       Labs:  Lab Results   Component Value Date    A1C 9.9 03/28/2025    A1C 7.4 02/22/2021     Lab Results   Component Value Date     03/28/2025     01/15/2025     09/24/2021     01/16/2020     Lab Results   Component Value Date    LDL 74 04/11/2025    LDL 88 03/05/2020     HDL Cholesterol   Date Value Ref Range Status   03/05/2020 33 (L) >39 mg/dL Final     Direct Measure HDL   Date Value Ref Range Status   04/11/2025 30 (L) >=40 mg/dL Final     GFR Estimate   Date Value Ref Range Status   03/28/2025 42 (L) >60 mL/min/1.73m2 Final     Comment:     eGFR calculated using 2021 CKD-EPI equation.   01/16/2020 79 >60 mL/min/[1.73_m2] Final     Comment:     Non  GFR Calc  Starting 12/18/2018, serum creatinine based estimated GFR (eGFR) will be   calculated using the Chronic Kidney Disease Epidemiology Collaboration   (CKD-EPI) equation.       GFR Estimate If Black   Date Value Ref Range Status   01/16/2020 >90 >60 mL/min/[1.73_m2] Final     Comment:      GFR Calc  Starting 12/18/2018, serum creatinine based estimated GFR (eGFR) will be   calculated using the Chronic Kidney Disease Epidemiology Collaboration   (CKD-EPI) equation.       Lab Results   Component Value Date    CR 1.77 03/28/2025    CR 1.01 01/16/2020     Lab Results "   Component Value Date    MICROL 57.3 05/16/2024    UMALCR 151.99 (H) 05/16/2024    UCRR 37.7 05/16/2024 5/1/2025   Healthy Eating   Healthy Eating Assessed Today Yes   Cultural/Yarsanism diet restrictions? No   How many times a week on average do you eat food made away from home (restaurant/take-out)? 1   Meals include Breakfast;Lunch;Dinner;Evening Snack   Breakfast cereal ( shredded wheat, cheerios with splenda), milk, yogurt(light and fit) Or eggs   Lunch skip or ham sandwich or cheese and crackers   Dinner [place] boiled dinner- (beef bone broth and vegetables) OR meatloaf, mashed potatoes,   Snacks BBQ chips, pretzels, salty snacks, sweets- cookies, cake, candy   Beverages Water;Milk   Has patient met with a dietitian in the past? Yes       Megan LOWEN, RN, PHN, [facility]   Time Spent: 20 minutes  Encounter Type: Individual    Any diabetes medication dose changes were made via the [facility] Standing Orders under the patient's referring provider.

## 2025-05-02 ENCOUNTER — HOSPITAL ENCOUNTER (OUTPATIENT)
Dept: CARDIAC REHAB | Facility: CLINIC | Age: 65
Discharge: HOME | End: 2025-05-02
Attending: INTERNAL MEDICINE
Payer: MEDICARE

## 2025-05-02 PROCEDURE — 93798 PHYS/QHP OP CAR RHAB W/ECG: CPT

## 2025-05-02 NOTE — PATIENT INSTRUCTIONS
Plan:  Watch your portions at meals, refer to the plate planner for balance and portion, refer to the meal plan and snack ideas    Follow medication instructions provided by Elizabeth (SUSIE)    Send an update in 2 weeks with how you are doing     Follow up: with MTM for furthe medication adjustments.      [facility] for the [facility]:  For Your Diabetes or Nutrition Education Appointments Call:  [phone]   For Diabetes or Nutrition Related Questions:   [phone]  Send [employer] Message   If you need a medication refill please contact your pharmacy. Please allow 3 business days for your refills to be completed.

## 2025-05-04 ENCOUNTER — MYC MEDICAL ADVICE (OUTPATIENT)
Dept: PHARMACY | Facility: CLINIC | Age: 65
End: 2025-05-04
Payer: MEDICARE

## 2025-05-04 ENCOUNTER — MYC MEDICAL ADVICE (OUTPATIENT)
Dept: EDUCATION SERVICES | Facility: CLINIC | Age: 65
End: 2025-05-04
Payer: MEDICARE

## 2025-05-05 ENCOUNTER — NURSE TRIAGE (OUTPATIENT)
Dept: NURSING | Facility: CLINIC | Age: 65
End: 2025-05-05
Payer: MEDICARE

## 2025-05-05 ENCOUNTER — HOSPITAL ENCOUNTER (OUTPATIENT)
Dept: CARDIAC REHAB | Facility: CLINIC | Age: 65
Discharge: HOME | End: 2025-05-05
Attending: INTERNAL MEDICINE
Payer: MEDICARE

## 2025-05-05 PROCEDURE — 93798 PHYS/QHP OP CAR RHAB W/ECG: CPT

## 2025-05-05 NOTE — TELEPHONE ENCOUNTER
[facility] - Endo RNs   Please review and follow up with patient, Thank you     LUCHO white F2F with Sindhu Mohan, DNP, APRN, FNP-C    Route to endo team     RN chart review     Diabetic medications are ordered by Endo [facility] not pcp     Routing to endo team to address     Marcela Blackburn, Registered Nurse  [facility]

## 2025-05-05 NOTE — TELEPHONE ENCOUNTER
Nurse Triage SBAR    Is this a 2nd Level Triage? YES, LICENSED PRACTITIONER REVIEW IS REQUIRED    Situation:Patient calling for guidance on blood sugar levels are they are varying greatly over the last 3 days.    Background: Caio is a type 1 diabetic patient who manages his levels with insulin and pills. For the last 3 days he has had levels as low as the 50s, even after eating high carb meals.    Assessment: Caio has a current blood glucose level of 73, though 15 min prior to my call was at 53. He is treating his hyperglycemia appropriately with a granola and oranges and is aware of other healthy and high carb foods that can be eaten.  He denies current mental status changes and is accompanied at home by his spouse. Caio states that he is currently working with the diabetes education team, specifically Tressa Case and Rashmi Olivas to manage these highs/lows.     Protocol Recommended Disposition:   Discuss With PCP And Callback By Nurse Within 1 Hour    Recommendation:  Caio and I discussed emergency treatment and what to do if his sugars return to the lower readings again. I've reached out to diabetes education and his PCP so they can follow up with him again today.    Routed to provider            Reason for Disposition   Low blood glucose (70 mg/dL [3.9 mmol/L] or below) OR symptomatic, now improved with Care Advice AND cause unknown    Additional Information   Negative: Unconscious or difficult to awaken   Negative: Seizure occurs   Negative: Acting confused (e.g., disoriented, slurred speech)   Negative: Very weak (can't stand)   Negative: Sounds like a life-threatening emergency to the triager   Negative: Vomiting and signs of dehydration (e.g., very dry mouth, lightheaded, dark urine, etc.)   Negative: Low blood sugar symptoms persist > 30 minutes AND using low blood sugar Care Advice   Negative: Low blood glucose (70 mg/dL [3.9 mmol/L] or below) persists > 30 minutes AND using low blood sugar Care  "Advice   Negative: Patient sounds very sick or weak to the triager   Negative: Diabetes medication overdose (e.g., insulin error) and triager unable to answer question   Negative: Caller has URGENT medication or insulin device (e.g., pump, continuous monitoring)  question and triager unable to answer question   Negative: Low blood glucose (70 mg/dL [3.9 mmol/L] or below) with no other adult present AND hasn't tried Care Advice   Negative: Low blood sugar symptoms with no other adult present AND hasn't tried Care Advice    Answer Assessment - Initial Assessment Questions  1. SYMPTOMS: \"What symptoms are you concerned about?\"      Blood sugar levels going too low after meals  2. ONSET:  \"When did the symptoms start?\"      Saturday 5/3/25  3. BLOOD GLUCOSE: \"What is your blood glucose level?\"       73  4. USUAL RANGE: \"What is your blood glucose level usually?\" (e.g., usual fasting morning value, usual evening value)      Trouble lately controlling this   5. TYPE 1 or 2:  \"Do you know what type of diabetes you have?\"  (e.g., Type 1, Type 2, Gestational; doesn't know)       2  6. INSULIN: \"Do you take insulin?\" \"What type of insulin(s) do you use? What is the mode of delivery? (syringe, pen; injection or pump) \"When did you last give yourself an insulin dose?\" (i.e., time or hours/minutes ago) \"How much did you give?\" (i.e., how many units)      Yes, long and short. Both taken as prescribed. 18 units novolog and 60 long acting today.  7. DIABETES PILLS: \"Do you take any pills for your diabetes?\" If Yes, ask: \"What is the name of the medicine(s) that you take for high blood sugar?\"      Viibryd  8. OTHER SYMPTOMS: \"Do you have any symptoms?\" (e.g., fever, frequent urination, difficulty breathing, vomiting)      no  9. LOW BLOOD GLUCOSE TREATMENT: \"What have you done so far to treat the low blood glucose level?\"      Granola bar and orange after low readings. Had just finished lunch at 1100.  10. FOOD: \"When did you last " "eat or drink?\"        10 min prior to call (BG corrected at this point).  11. ALONE: \"Are you alone right now or is someone with you?\"         With wife  12. PREGNANCY: \"Is there any chance you are pregnant?\" \"When was your last menstrual period?\"        N/A    Protocols used: Diabetes - Low Blood Sugar-A-REBECCA Jaquez RN  P Central Nursing/Red Flag Triage & Med Refill Team    "

## 2025-05-06 ENCOUNTER — ALLIED HEALTH/NURSE VISIT (OUTPATIENT)
Dept: EDUCATION SERVICES | Facility: CLINIC | Age: 65
End: 2025-05-06
Payer: MEDICARE

## 2025-05-06 DIAGNOSIS — E11.40 TYPE 2 DIABETES MELLITUS WITH DIABETIC NEUROPATHY, WITHOUT LONG-TERM CURRENT USE OF INSULIN (H): Primary | ICD-10-CM

## 2025-05-06 RX ORDER — LANCETS
EACH MISCELLANEOUS
Qty: 100 EACH | Refills: 6 | Status: SHIPPED | OUTPATIENT
Start: 2025-05-06

## 2025-05-06 NOTE — TELEPHONE ENCOUNTER
Discussed in person with patient at CDE appointment today. See CDE encounter 5/6/25 for detail.     Isamar RodriguezD   Medication Therapy Management   Pharmacy Resident  Pager: [phone]   
Walk in

## 2025-05-06 NOTE — LETTER
5/6/2025         RE: Steve Morgan  [address]  [facility] [address]        Dear Colleague,    Thank you for referring your patient, Steve Morgan, to the [facility]. Please see a copy of my visit note below.    Diabetes Follow-up    Subjective/Objective:    Steve Morgan requests review of glucose and monitoring report.   Comments/concerns: hypoglycemia events in the last few days    Diabetes is being managed with Diabetes Medications   Diabetes Medication(s)       Insulin       insulin aspart (NOVOLOG PEN) 100 UNIT/ML pen 18 units before each meal plus a sliding scale of 1:50> 150. Est TDD: 75 units/day     insulin glargine 100 UNIT/ML pen Inject 60 Units subcutaneously daily. DOSE CHANGE do Not fill unless patient requests       Sulfonylureas       glipiZIDE (GLUCOTROL XL) 10 MG 24 hr tablet Take 10 mg by mouth daily.patient reports he looked in his meds and he is not taking this medication,        Incretin Mimetic Agents       tirzepatide (MOUNJARO) 2.5 MG/0.5ML SOAJ auto-injector pen Inject 0.5 mLs (2.5 mg) subcutaneously once a week. Not taking due to cost            Report:             Assessment/Plan:  Patient has been experiencing symptomatic hypoglycemia the last few days. Yesterday he was treating the low several times and he did not take his dinner dose of Novolog. He woke up this morning with hypoglycemia, he felt weak and dizzy. He does not have a glucometer to verify accuracy and is unable to self test due to tremors.   Tested BG today in clinic against his glucometer, Nina reading of 228mg/dl and glucometer reading 227mg/dl.     Recommend he decrease his insulin    Lantus 60 units in the morning --> 50 units   Novolog 18-18-18 --> 14-14-14, plus he can continue to use the sliding scale.     Will send a prescription for a glucometer to the pharmacy, he states his wife or son can help him do a test if needing to verify CGM accuracy     He has follow up  schedule MTM, he can continue to send weekly updates to myself or MTM as needed.    Megan CANNON, RN, PHN, [facility]   Time Spent: 20 minutes    Any diabetes medication dose changes were made via the CDE Protocol and Collaborative Practice Agreement with the patient's referring provider. A copy of this encounter was shared with the provider.

## 2025-05-06 NOTE — PROGRESS NOTES
Diabetes Follow-up    Subjective/Objective:    Steve MONY Morgan requests review of glucose and monitoring report.   Comments/concerns: hypoglycemia events in the last few days    Diabetes is being managed with Diabetes Medications   Diabetes Medication(s)       Insulin       insulin aspart (NOVOLOG PEN) 100 UNIT/ML pen 18 units before each meal plus a sliding scale of 1:50> 150. Est TDD: 75 units/day     insulin glargine 100 UNIT/ML pen Inject 60 Units subcutaneously daily. DOSE CHANGE do Not fill unless patient requests       Sulfonylureas       glipiZIDE (GLUCOTROL XL) 10 MG 24 hr tablet Take 10 mg by mouth daily.patient reports he looked in his meds and he is not taking this medication,        Incretin Mimetic Agents       tirzepatide (MOUNJARO) 2.5 MG/0.5ML SOAJ auto-injector pen Inject 0.5 mLs (2.5 mg) subcutaneously once a week. Not taking due to cost            Report:             Assessment/Plan:  Patient has been experiencing symptomatic hypoglycemia the last few days. Yesterday he was treating the low several times and he did not take his dinner dose of Novolog. He woke up this morning with hypoglycemia, he felt weak and dizzy. He does not have a glucometer to verify accuracy and is unable to self test due to tremors.   Tested BG today in clinic against his glucometer, Nina reading of 228mg/dl and glucometer reading 227mg/dl.     Recommend he decrease his insulin    Lantus 60 units in the morning --> 50 units   Novolog 18-18-18 --> 14-14-14, plus he can continue to use the sliding scale.     Will send a prescription for a glucometer to the pharmacy, he states his wife or son can help him do a test if needing to verify CGM accuracy     He has follow up schedule MTM, he can continue to send weekly updates to myself or MTM as needed.    Megan LOWEN, RN, PHN, [facility]   Time Spent: 20 minutes    Any diabetes medication dose changes were made via the CDE Protocol and Collaborative Practice Agreement with the  patient's referring provider. A copy of this encounter was shared with the provider.

## 2025-05-06 NOTE — PATIENT INSTRUCTIONS
Plan:  Decrease your Lantus (long acting) insulin to 50 units daily.    Novolog: inject 14 units before each meal, if eating a smaller meal or concerned about a low blood sugar then only take half the dose.  You can continue to use the correction scale     Follow up with Elizabeth as scheduled, and ok to send a [email or web address] message in 1 week or sooner if needed.       [place] [facility] for the [facility]:  For Your Diabetes or Nutrition Education Appointments Call:  [phone]   For Diabetes or Nutrition Related Questions:   [phone]  Send [facility] Message   If you need a medication refill please contact your pharmacy. Please allow 3 business days for your refills to be completed.

## 2025-05-09 ENCOUNTER — HOSPITAL ENCOUNTER (OUTPATIENT)
Dept: CARDIAC REHAB | Facility: CLINIC | Age: 65
Discharge: HOME | End: 2025-05-09
Attending: INTERNAL MEDICINE
Payer: MEDICARE

## 2025-05-09 PROCEDURE — 93798 PHYS/QHP OP CAR RHAB W/ECG: CPT

## 2025-05-16 ENCOUNTER — HOSPITAL ENCOUNTER (OUTPATIENT)
Dept: CARDIAC REHAB | Facility: CLINIC | Age: 65
Discharge: HOME | End: 2025-05-16
Attending: INTERNAL MEDICINE
Payer: MEDICARE

## 2025-05-16 PROCEDURE — 93798 PHYS/QHP OP CAR RHAB W/ECG: CPT | Performed by: OCCUPATIONAL THERAPIST

## 2025-05-21 ENCOUNTER — MEDICAL CORRESPONDENCE (OUTPATIENT)
Dept: HEALTH INFORMATION MANAGEMENT | Facility: CLINIC | Age: 65
End: 2025-05-21
Payer: MEDICARE

## 2025-05-21 ENCOUNTER — MYC MEDICAL ADVICE (OUTPATIENT)
Dept: PHARMACY | Facility: CLINIC | Age: 65
End: 2025-05-21
Payer: MEDICARE

## 2025-05-22 DIAGNOSIS — N18.31 ANEMIA OF CHRONIC RENAL FAILURE, STAGE 3A (H): Primary | ICD-10-CM

## 2025-05-22 DIAGNOSIS — D50.9 IRON DEFICIENCY ANEMIA, UNSPECIFIED IRON DEFICIENCY ANEMIA TYPE: ICD-10-CM

## 2025-05-22 DIAGNOSIS — N18.32 STAGE 3B CHRONIC KIDNEY DISEASE (H): Primary | ICD-10-CM

## 2025-05-22 DIAGNOSIS — R80.9 PROTEINURIA: ICD-10-CM

## 2025-05-22 DIAGNOSIS — N18.31 STAGE 3A CHRONIC KIDNEY DISEASE (H): ICD-10-CM

## 2025-05-22 DIAGNOSIS — D63.1 ANEMIA OF CHRONIC RENAL FAILURE, STAGE 3A (H): Primary | ICD-10-CM

## 2025-05-22 RX ORDER — HEPARIN SODIUM,PORCINE 10 UNIT/ML
5-20 VIAL (ML) INTRAVENOUS DAILY PRN
OUTPATIENT
Start: 2025-05-22

## 2025-05-22 RX ORDER — ALBUTEROL SULFATE 90 UG/1
1-2 INHALANT RESPIRATORY (INHALATION)
Start: 2025-05-22

## 2025-05-22 RX ORDER — DIPHENHYDRAMINE HYDROCHLORIDE 50 MG/ML
25 INJECTION, SOLUTION INTRAMUSCULAR; INTRAVENOUS
Start: 2025-05-22

## 2025-05-22 RX ORDER — EPINEPHRINE 1 MG/ML
0.3 INJECTION, SOLUTION INTRAMUSCULAR; SUBCUTANEOUS EVERY 5 MIN PRN
OUTPATIENT
Start: 2025-05-22

## 2025-05-22 RX ORDER — MEPERIDINE HYDROCHLORIDE 25 MG/ML
25 INJECTION INTRAMUSCULAR; INTRAVENOUS; SUBCUTANEOUS
OUTPATIENT
Start: 2025-05-22

## 2025-05-22 RX ORDER — ALBUTEROL SULFATE 0.83 MG/ML
2.5 SOLUTION RESPIRATORY (INHALATION)
OUTPATIENT
Start: 2025-05-22

## 2025-05-22 RX ORDER — DIPHENHYDRAMINE HYDROCHLORIDE 50 MG/ML
50 INJECTION, SOLUTION INTRAMUSCULAR; INTRAVENOUS
Start: 2025-05-22

## 2025-05-22 RX ORDER — METHYLPREDNISOLONE SODIUM SUCCINATE 40 MG/ML
40 INJECTION INTRAMUSCULAR; INTRAVENOUS
Start: 2025-05-22

## 2025-05-22 RX ORDER — HEPARIN SODIUM (PORCINE) LOCK FLUSH IV SOLN 100 UNIT/ML 100 UNIT/ML
5 SOLUTION INTRAVENOUS
OUTPATIENT
Start: 2025-05-22

## 2025-05-22 NOTE — TELEPHONE ENCOUNTER
See CGM report below. Recommend decrease Novolog by 10-20% to avoid post prandial lows. May need increased dose of insulin glargine due to overnight highs, but will hold off on for now due to semi-frequent daytime lows.     PLAN:  Decrease Novolog to 12 units before meals or 6 units before snacks.         All changes made per CPA with Sindhu Mohan.     Elizabeth Olivas, PharmD   Medication Therapy Management   Pharmacy Resident  Pager: [phone]

## 2025-05-28 ENCOUNTER — HOSPITAL ENCOUNTER (OUTPATIENT)
Dept: CARDIAC REHAB | Facility: CLINIC | Age: 65
Discharge: HOME | End: 2025-05-28
Attending: INTERNAL MEDICINE
Payer: MEDICARE

## 2025-05-28 PROCEDURE — 93798 PHYS/QHP OP CAR RHAB W/ECG: CPT

## 2025-05-30 ENCOUNTER — HOSPITAL ENCOUNTER (OUTPATIENT)
Dept: CARDIAC REHAB | Facility: CLINIC | Age: 65
Discharge: HOME | End: 2025-05-30
Attending: INTERNAL MEDICINE
Payer: MEDICARE

## 2025-05-30 LAB — GLUCOSE BLDC GLUCOMTR-MCNC: 261 MG/DL (ref 70–99)

## 2025-05-30 PROCEDURE — 93798 PHYS/QHP OP CAR RHAB W/ECG: CPT | Performed by: OCCUPATIONAL THERAPIST

## 2025-06-04 ENCOUNTER — HOSPITAL ENCOUNTER (OUTPATIENT)
Dept: CARDIAC REHAB | Facility: CLINIC | Age: 65
Discharge: HOME | End: 2025-06-04
Attending: INTERNAL MEDICINE
Payer: MEDICARE

## 2025-06-04 ENCOUNTER — OFFICE VISIT (OUTPATIENT)
Dept: ENDOCRINOLOGY | Facility: CLINIC | Age: 65
End: 2025-06-04
Payer: MEDICARE

## 2025-06-04 VITALS
WEIGHT: 243.1 LBS | SYSTOLIC BLOOD PRESSURE: 145 MMHG | OXYGEN SATURATION: 98 % | DIASTOLIC BLOOD PRESSURE: 69 MMHG | BODY MASS INDEX: 40.5 KG/M2 | HEART RATE: 62 BPM | TEMPERATURE: 98.1 F | HEIGHT: 65 IN | RESPIRATION RATE: 18 BRPM

## 2025-06-04 DIAGNOSIS — N18.32 STAGE 3B CHRONIC KIDNEY DISEASE (H): ICD-10-CM

## 2025-06-04 DIAGNOSIS — E78.5 DYSLIPIDEMIA: ICD-10-CM

## 2025-06-04 DIAGNOSIS — D63.1 ANEMIA OF CHRONIC RENAL FAILURE, STAGE 3B (H): ICD-10-CM

## 2025-06-04 DIAGNOSIS — E11.40 TYPE 2 DIABETES MELLITUS WITH DIABETIC NEUROPATHY, WITH LONG-TERM CURRENT USE OF INSULIN (H): Primary | ICD-10-CM

## 2025-06-04 DIAGNOSIS — N18.32 ANEMIA OF CHRONIC RENAL FAILURE, STAGE 3B (H): ICD-10-CM

## 2025-06-04 DIAGNOSIS — Z79.4 TYPE 2 DIABETES MELLITUS WITH DIABETIC NEUROPATHY, WITH LONG-TERM CURRENT USE OF INSULIN (H): Primary | ICD-10-CM

## 2025-06-04 DIAGNOSIS — E03.4 HYPOTHYROIDISM DUE TO ACQUIRED ATROPHY OF THYROID: ICD-10-CM

## 2025-06-04 LAB
ANION GAP SERPL CALCULATED.3IONS-SCNC: 10 MMOL/L (ref 7–15)
BUN SERPL-MCNC: 37.6 MG/DL (ref 8–23)
CALCIUM SERPL-MCNC: 9.5 MG/DL (ref 8.8–10.4)
CHLORIDE SERPL-SCNC: 104 MMOL/L (ref 98–107)
CHOLEST SERPL-MCNC: 100 MG/DL
CREAT SERPL-MCNC: 2.04 MG/DL (ref 0.67–1.17)
EGFRCR SERPLBLD CKD-EPI 2021: 35 ML/MIN/1.73M2
FASTING STATUS PATIENT QL REPORTED: ABNORMAL
FASTING STATUS PATIENT QL REPORTED: ABNORMAL
GLUCOSE SERPL-MCNC: 156 MG/DL (ref 70–99)
HCO3 SERPL-SCNC: 24 MMOL/L (ref 22–29)
HDLC SERPL-MCNC: 29 MG/DL
LDLC SERPL CALC-MCNC: 41 MG/DL
NONHDLC SERPL-MCNC: 71 MG/DL
POTASSIUM SERPL-SCNC: 5.4 MMOL/L (ref 3.4–5.3)
SODIUM SERPL-SCNC: 138 MMOL/L (ref 135–145)
TRIGL SERPL-MCNC: 151 MG/DL

## 2025-06-04 PROCEDURE — G2211 COMPLEX E/M VISIT ADD ON: HCPCS | Performed by: INTERNAL MEDICINE

## 2025-06-04 PROCEDURE — 93798 PHYS/QHP OP CAR RHAB W/ECG: CPT

## 2025-06-04 PROCEDURE — 95251 CONT GLUC MNTR ANALYSIS I&R: CPT | Performed by: INTERNAL MEDICINE

## 2025-06-04 PROCEDURE — 3077F SYST BP >= 140 MM HG: CPT | Performed by: INTERNAL MEDICINE

## 2025-06-04 PROCEDURE — 3078F DIAST BP <80 MM HG: CPT | Performed by: INTERNAL MEDICINE

## 2025-06-04 PROCEDURE — 99215 OFFICE O/P EST HI 40 MIN: CPT | Performed by: INTERNAL MEDICINE

## 2025-06-04 RX ORDER — INSULIN GLARGINE 100 [IU]/ML
56 INJECTION, SOLUTION SUBCUTANEOUS DAILY
Qty: 15 ML | Refills: 2 | Status: SHIPPED | OUTPATIENT
Start: 2025-06-04

## 2025-06-04 RX ORDER — KETOROLAC TROMETHAMINE 30 MG/ML
1 INJECTION, SOLUTION INTRAMUSCULAR; INTRAVENOUS ONCE
Qty: 1 EACH | Refills: 0 | Status: CANCELLED | OUTPATIENT
Start: 2025-06-04 | End: 2025-06-04

## 2025-06-04 RX ORDER — BLOOD-GLUCOSE SENSOR
EACH MISCELLANEOUS
Qty: 6 EACH | Refills: 3 | Status: SHIPPED | OUTPATIENT
Start: 2025-06-04

## 2025-06-04 RX ORDER — HYDROCHLOROTHIAZIDE 12.5 MG/1
CAPSULE ORAL
Qty: 6 EACH | Refills: 3 | Status: CANCELLED | OUTPATIENT
Start: 2025-06-04

## 2025-06-04 NOTE — PATIENT INSTRUCTIONS
[facility]  Dr Meraz, Endocrinology Department    [facility]   [address] [facility]  [place] [address]  Appointment [phone]  Fax: [phone]  Bryce: Monday - Thursday      Please check the cost coverage and copay with insurance before recommended tests, services and medications (especially if new medications are prescribed).     If ordered, please get blood work done 1 week prior to your next appointment so they will be available to Dr. Meraz at your visit.    To provide the best diabetic care, please bring your blood glucose meter to each and every visit with your  Endocrinologist. Your blood glucose CGM/meter/insulin pump will be downloaded at every appointment.  Please arrive 15 minutes before your scheduled appointment. This will allow for your blood glucose CGM/meter/insulin pump  to be downloaded.  If you are wearing DEXCOM please bring  or sharing code from the Dexcom [employer] [place] so that it can be downloaded.  If you are using FREESTYLE NINA personal sensors please bring the reader.    Increase Lantus to 56 units/day   Increase Novolog by 2 units at dinner-- take 16 units at dinner  Rest regimen same  Nina 2 plus Rx sent.  Check cost of Jardiance with insurance.  Follow up with CDE for diet education.  Follow up with MTM as scheduled. Recommend close follow up.    Follow up with Cara SMALLS in 4-5 months with labs prior.  Repeat labs and follow up with  in 4-5 month after that.  Please make a lab appointment for blood work and follow up clinic appointment in 1 week after that to discuss results.    Recommend checking blood sugars before meals and at bedtime.    If Blood glucose are low more often-> 2-3 times/week- give us a call.  Make better food choices: reduce carbs, Reduce portion size, weight loss and exercise 3-4 times a week.    What is hypoglycemia:  Hypoglycemia is when blood sugar levels become too  low - below 70 m/dl.      What causes hypoglycemia?  - using too much insulin  -taking too many diabetes pills  -not eating enough, or skipping meals or snacks  -not eating enough carbohydrate with meals  -changing your exercise routine  -drinking alcohol in excess    It is also possible to have hypoglycemia even when you are carefully managing your blood sugar levels.    What does it feel like when blood sugars get too low?  You may feel:  - anxious  -confused  -dizzy  -hungry  -light-headed  -nervous  -shaky  -sleepy  -sweaty    You may have  -blurred or cloudy vision  -heart palpitations (heart skips a beat or races)  -tingling or numbness around the mouth and tongue  -tremors    What to do if you have symptoms of hypoglycmemia:  If you think your blood sugar is too low, check it with a glucose meter.  If its below 70 mg/dl, consume one of the following:  Fruit juice (1/2 cup)  Glucose tablets (15 grams)  Hard candy (5 to 7 pieces)  Honey or sugar (2 teaspoons)  Milk (1/2 cup)  Soft drink (non-diet, 1/2 cup)    Wait 15 minutes and check your blood glucose again.  IF it is still below 70 mg/dl, have another food item listed above. Wait another 15 minutes and repeat the blood glucose test.  Have a small meal or snack that contains some carbohydrate after your blood glucose rises above 70 mg/dl.    If you are at risk of hypoglycemia, always carry with you glucose tablets or one of the foods listed above.      To prevent Hypoglycemia:  Avoid situations that may cause hypoglycemia  Before making any change to your diet or exercise routine, discuss them with your healthcare provider  Keep a record of your blood glucose levels.  Include the time of day, diabetes medications, when you had your last meal or snack, and what you were doing at the time (e.g. Watching TV, gardening, jogging, etc).    Talk to your healthcare provider if your blood glucose levels are often low        Patient guide on  hypoglycemia    [email or web address]

## 2025-06-04 NOTE — PROGRESS NOTES
"Name: Steve \"Caio\" Cathy  F/u for Diabetes and hypothyroidism.  HPI:   For f/u of DM.    has a past medical history of BPH (benign prostatic hyperplasia), Cellulitis and abscess of trunk (06/27/2017), Depression, Depressive disorder, Diverticular disease of colon, GERD (gastroesophageal reflux disease), Hyperlipidemia LDL goal <100 (10/31/2010), Hypertension goal BP (blood pressure) < 140/90 (03/17/2011), Hypothyroidism (01/12/2010), Morbid obesity due to excess calories (H) (01/12/2010), Neuropathy in diabetes (H) (01/12/2010), Obesity (01/12/2010), PVD (peripheral vascular disease), Sleep apnea (01/12/2010), Type 2 diabetes, HbA1C goal < 8% (H) (03/08/2011), and Vitamin B12 deficiency (non anemic).    He has challenges with blood sugar testing due to limited use of his hands (neuropathy+ contractures + tremor - both significant) and increasing difficulty with ADL's. Can't test his blood sugars using a meter due to the above.   He also has an hard time inserting the Nina sensors due to neuropathy and tremor.  He has stopping drinking pop since Sep 2021.  Exercise is limited.  CKD stage 3- followed by nephrology.  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  He underwent I&D ans/p partial amputation.  Earlier noted mild hyperkalemia which since has resolved.    Was is using nina 2 and is wondering if he can get a new prescription for nina 2+.  Diet- not focused on ADA diet  He was on Jardaince-- not taking as it was expensive. He reports that other SGLT-2 inh are also expensive.  History of intermittent hyperkalemia.   Appetite is OK.  Weight: stable  Wt Readings from Last 2 Encounters:   06/04/25 110.3 kg (243 lb 1.6 oz)   05/01/25 108.4 kg (239 lb)     1. Type 2 DM:  Orginally diagnosed at the age of 33 or 38.  Was prediabetic prior, was not particularly symptomatic at the time, was noted on routine lab work    Current Regimen:   Lantus 50 units at night  Novolog 12-14 " units TID before each meal + correctional scale at bedtime 1:50>150(Estimating)      yes:     Diabetes Medication(s)       Insulin       insulin aspart (NOVOLOG PEN) 100 UNIT/ML pen 14-18 units before each meal plus a sliding scale of 1:50> 150. Est TDD: 75 units/day     insulin glargine 100 UNIT/ML pen Inject 50 Units subcutaneously daily. DOSE CHANGE do Not fill unless patient requests       Sulfonylureas       glipiZIDE (GLUCOTROL XL) 10 MG 24 hr tablet Take 10 mg by mouth daily.       Incretin Mimetic Agents       tirzepatide (MOUNJARO) 2.5 MG/0.5ML SOAJ auto-injector pen Inject 0.5 mLs (2.5 mg) subcutaneously once a week.              The plan was to start Trulicity but the copay was not affordable ($100 per refill).  Trulicity was prescribed by peter cole PA-C but it was expensive and he never started it.  Jardiance was expensive. Reports that other SGLT2 inh are also not covered by insurance.  Metformin on hold in the setting of CKD  Discontinued metformin and glipizide due to CKD during 1/2025 hospitalization  Mounjaro- was expensive, never started      BS checks: agencyQ  Nina Download: Blood glucose data reviewed personally. See nursing note from this encounter for details.  Unable to use glucose meter - he has a hard time doing fingerstick testing due to his tremor.    Complications:   Diabetes Complications  Description / Detail    Diabetic Retinopathy  No retinopathy,last exam 2/2020, [facility]   CAD / PAD  No   Neuropathy  Yes + diabetic neuropathy: numbness hands and feet.  Saw podiatry, Dr. Mathis, 1/27/2018- using diabetic shoes   Nephropathy / Microalbuminuria  Yes, elevated urine microalbumin   Gastroparesis  No   Hypoglycemia [place]  Not sure, gets 'kind of dizzy' when blood sugar is low but reports history of low blood sugars without symptoms     2. Hypertension: Blood Pressure:   BP Readings from Last 3 Encounters:   06/04/25 (!) 145/69   05/01/25 130/60   04/10/25 126/64   Blood pressure  medications include atenolol 25 mg qd and losartan 50 mg every day.   3. Hyperlipidemia: Takes simvastatin 20 mg for lipid control.       4.  Hypothyroidism. Currently treated with levoxyl (KAMERON) 137 mcg daily. Takes the levoxyl first thing in the morning and waits 30+ minutes before eating breakfast.   PMH/PSH:  Past Medical History:   Diagnosis Date    BPH (benign prostatic hyperplasia)     Cellulitis and abscess of trunk 06/27/2017    Depression     Depressive disorder     Diverticular disease of colon     GERD (gastroesophageal reflux disease)     Hyperlipidemia LDL goal <100 10/31/2010    Hypertension goal BP (blood pressure) < 140/90 03/17/2011    Hypothyroidism 01/12/2010    Morbid obesity due to excess calories (H) 01/12/2010    Neuropathy in diabetes (H) 01/12/2010    Obesity 01/12/2010    PVD (peripheral vascular disease)     Sleep apnea 01/12/2010    CPAP    Type 2 diabetes, HbA1C goal < 8% (H) 03/08/2011    Vitamin B12 deficiency (non anemic)      Past Surgical History:   Procedure Laterality Date    BIOPSY      BURSECTOMY KNEE Right 05/20/2024    Procedure: Excisional prepatellar bursectomy, right knee;  Surgeon: Justin Bo MD;  Location:  OR    COLONOSCOPY      COSMETIC SURGERY      CV CORONARY ANGIOGRAM N/A 1/10/2025    Procedure: Coronary Angiogram;  Surgeon: Magan Gallardo MD;  Location:  HEART CARDIAC CATH LAB    CV INTRAVASULAR ULTRASOUND N/A 1/14/2025    Procedure: Intravascular Ultrasound;  Surgeon: Magan Gallardo MD;  Location: [facility] CARDIAC CATH LAB    CV PCI STENT DRUG ELUTING N/A 1/14/2025    Procedure: Percutaneous Coronary Intervention Stent;  Surgeon: Magan Gallardo MD;  Location: [facility] CARDIAC CATH LAB    EYE SURGERY Bilateral     Catracts    GENITOURINARY SURGERY      surg for undescended testicle    IRRIGATION AND DEBRIDEMENT HAND, COMBINED Right 12/23/2022    Procedure: Right long finger irrigation and debridement with partial amputation  of the right long finger. ;  Surgeon: Colby English MD;  Location: [facility] OR    REPAIR HAMMER TOE BILATERAL  05/16/2013    Procedure: REPAIR HAMMER TOE BILATERAL;  Flexor Tenotomy Toes 2,3,4,5 Bilateral Feet;  Surgeon: Saad Bangura DPM;  Location: [facility] OR     Family Hx:  Family History   Problem Relation Age of Onset    Breast Cancer Mother     Hypertension Mother     Thyroid Disease Mother     Depression Mother     Alzheimer Disease Mother 82    Obesity Mother     Cancer - colorectal Father     Thyroid Disease Father     Depression Father     Other - See Comments Father         bladder polyps    Prostate Cancer Father     Other Cancer Father     Diabetes Brother         Brother    Depression Brother     Diabetes Brother     Asthma Brother     Depression Brother     Anxiety Disorder Brother     Mental Illness Brother     Heart Disease Maternal Grandmother         CHF    Circulatory Paternal Grandmother     Cancer Paternal Grandfather     No Known Problems Daughter     No Known Problems Son     Diabetes Other         Great Aunt     Thyroid disease: Yes: mother         DM2: Yes: one brother with type 1 diabetes and one brother with type 2 diabetes, paternal aunt with DM2         Autoimmune: DM1, SLE, RA, Vitiligo Yes: brother with DM1    Social Hx:  Social History     Socioeconomic History    Marital status:      Spouse name: Sri    Number of children: 2    Years of education: Not on file    Highest education level: Associate degree: occupational, technical, or vocational program   Occupational History    Occupation:      Employer: NONE      Comment: [employer]   Tobacco Use    Smoking status: Never    Smokeless tobacco: Never    Tobacco comments:     Smoked for about 6 months when I was 16 but haven't touched them since   Vaping Use    Vaping status: Never Used   Substance and Sexual Activity    Alcohol use: Yes     Comment: every 2-3 months    Drug use: Never    Sexual activity: Not  Currently     Partners: Female     Birth control/protection: Abstinence   Other Topics Concern    Parent/sibling w/ CABG, MI or angioplasty before 65F 55M? Yes   Social History Narrative    Not on file     Social Drivers of Health     Financial Resource Strain: Low Risk  (6/1/2025)    Financial Resource Strain     Within the past 12 months, have you or your family members you live with been unable to get utilities (heat, electricity) when it was really needed?: No   Food Insecurity: Low Risk  (6/1/2025)    Food Insecurity     Within the past 12 months, did you worry that your food would run out before you got money to buy more?: No     Within the past 12 months, did the food you bought just not last and you didn t have money to get more?: No   Transportation Needs: Low Risk  (6/1/2025)    Transportation Needs     Within the past 12 months, has lack of transportation kept you from medical appointments, getting your medicines, non-medical meetings or appointments, work, or from getting things that you need?: No   Physical Activity: Insufficiently Active (6/1/2025)    Exercise Vital Sign     Days of Exercise per Week: 2 days     Minutes of Exercise per Session: 50 min   Stress: No Stress Concern Present (6/1/2025)    [employer] - Occupational Stress Questionnaire     Feeling of Stress : Only a little   Social Connections: Unknown (6/1/2025)    Social Connection and Isolation Panel [NHANES]     Frequency of Communication with Friends and Family: Not on file     Frequency of Social Gatherings with Friends and Family: Once a week     Attends Confucianism Services: Not on file     Active Member of Clubs or Organizations: Not on file     Attends Club or Organization Meetings: Not on file     Marital Status: Not on file   Interpersonal Safety: Low Risk  (1/14/2025)    Interpersonal Safety     Do you feel physically and emotionally safe where you currently live?: Yes     Within the past 12 months,  "have you been hit, slapped, kicked or otherwise physically hurt by someone?: No     Within the past 12 months, have you been humiliated or emotionally abused in other ways by your partner or ex-partner?: No   Housing Stability: Low Risk  (6/1/2025)    Housing Stability     Do you have housing? : Yes     Are you worried about losing your housing?: No          MEDICATIONS:  has a current medication list which includes the following prescription(s): acetaminophen, amlodipine, aspirin, bisacodyl, blood glucose, blood glucose monitoring, calcium carb-cholecalciferol, freestyle juan 2 sensor, ezetimibe, finasteride, gabapentin, glipizide, insulin aspart, insulin glargine, insulin pen needle, b-d u/f, isosorbide mononitrate, isosorbide mononitrate, lactobacillus rhamnosus (gg), latanoprost, levoxyl, metoprolol succinate er, mirtazapine, multi-vitamin, nitroglycerin, polyethylene glycol, prasugrel, rosuvastatin, senna-docusate, sodium bicarbonate, thin, tirzepatide, and vilazodone.    ROS     ROS: 10 point ROS neg other than the symptoms noted above in the HPI.    PE:  BP (!) 145/69 (BP Location: Left arm, Patient Position: Chair, Cuff Size: Adult Regular)   Pulse 62   Temp 98.1  F (36.7  C) (Tympanic)   Resp 18   Ht 1.651 m (5' 5\")   Wt 110.3 kg (243 lb 1.6 oz)   SpO2 98%   BMI 40.45 kg/m    GENERAL: healthy, alert and no distress  EYES: Eyes grossly normal to inspection, conjunctivae and sclerae normal  ENT: no nose swelling, nasal discharge.  Thyroid: no apparent thyroid nodules.  Thyroid appears normal in size and nontender.  CV: RRR, no rubs, gallops, no murmurs  RESP: CTAB, no wheezes, rales, or ronchi  ABDO: +BS  EXTREMITIES: no hand tremors.  NEURO: Cranial nerves grossly intact, mentation intact and speech normal  SKIN: No apparent skin lesions, rash or edema seen   PSYCH: mentation appears normal, affect normal/bright, judgement and insight intact, normal speech and appearance " well-groomed    LABS:    Component      Latest Ref Rng & Units 8/29/2019   Glucose 316   C-Peptide      0.9 - 6.9 ng/mL 2.5     A1c:  Lab Results   Component Value Date    A1C 9.9 03/28/2025    A1C 7.2 01/06/2025    A1C 7.9 10/21/2024    A1C 7.6 07/09/2024    A1C 8.0 05/14/2024    A1C 7.4 02/22/2021    A1C 9.9 03/12/2020    A1C 11.7 12/05/2019    A1C 11.6 08/29/2019    A1C 11.8 12/14/2018     Basic Metabolic Panel:  Creatinine   Date Value Ref Range Status   03/28/2025 1.77 (H) 0.67 - 1.17 mg/dL Final   01/16/2020 1.01 0.66 - 1.25 mg/dL Final     Urine microabumin:  Lab Results   Component Value Date    UMALCR 151.99 05/16/2024    UMALCR 120.00 07/30/2021    UMALCR 81.46 03/05/2020           LFTS/Cholesterol Panel:  Recent Labs   Lab Test 04/11/25  0919 01/15/25  0427   CHOL 125 116   HDL 30* 29*   LDL 74 51   TRIG 103 179*       Thyroid Function:   Latest Ref Rng 8/26/2024  2:02 PM   ENDO THYROID [facility]     TSH 0.30 - 4.20 uIU/mL 2.37    Free T3 2.3 - 4.2 pg/mL    FREE T4 0.90 - 1.70 ng/dL 1.47        Component    Latest Ref Rng & Units 10/19/2018   Thyroid Peroxidase Antibody    <35 IU/mL 11   Thyroglobulin Antibody    <40 IU/mL <20   Thyroid Stim Immunog    <=1.3 TSI index <1.0     All pertinent notes, labs, and images personally reviewed by me.     A/P  Mr.Walter Morgan is a 65 year old evaluated via phone visit for the management of:    1. DM2 - Under poor control. A1c 69.9%.  Diabetes is complicated by neuropathy and nephropathy/elevated urine microalbumin. H/o multivessel CAD s/p RCA/RPDA PCI in 1/2025   He is followed by nephrology.  Can't test his blood sugars using a meter due to neuropathy/hand contractures  and will benefit from use of continuous glucose monitor.  Noted post dinner high BG. Overall Bg are high and average B G is204  Plan:  Discussed diagnosis, pathophysiology, management and treatment options of condition with pt.  I also discussed importance of strict blood sugar control to prevent  complications associated with uncontrolled diabetes.    Increase Lantus to 56 units/day   Increase Novolog by 2 units at dinner-- take 16 units at dinner  Rest regimen same  Nina 2 plus Rx sent.  Discussed Jardiance in general.  Check cost of Jardiance with insurance.  He can benefit from use of SGLT2 medication.  Of note history of hyperlipidemia so will need careful monitoring.    Follow up with CDE for diet education.  Follow up with MTM as scheduled. Recommend close follow up.  Dual medication like Soliqua was considered cleared by CDE but currently using it insulin at home and will like to continue that.      Follow up with Cara SMALLS in 4-5 months with labs prior.  Repeat labs and follow up with  in 4-5 month after that.  Please make a lab appointment for blood work and follow up clinic appointment in 1 week after that to discuss results.      2. Hypothyroidism.  Currently treated with Levoxyl (KAMERON) 137 mcg/day.  Clinically and biochemically euthyroid.   8/2024 Labs in range.  Plan:  Discussed diagnosis, pathophysiology, management and treatment options of condition with pt.  Continue Levoxyl 137 mcg/day.   Labs in few months or sooner if concerns.    Discussed s/s of hypothyroidism and hyperthyroidism to watch for.  The patient indicates understanding of these issues and agrees with the plan.    3. Hyperlipidemia - On statin therapy. . Managed by PCP. Not discussed.    4.  Hypertension--on amlodipine, metoprolol    5. Prevention:  Opthalmology-Yes: recommend annually  Dental-Yes:recommend twice a year  ASA-Yes, 81 mg qd   Smoking- No      Plan: insulin glargine 100 UNIT/ML pen, Continuous         Glucose Sensor (FREESTYLE NINA 2 PLUS SENSOR)         MISC, Albumin Random Urine Quantitative with         Creat Ratio, Creatinine, Hemoglobin A1c,         GLUCOSE MONITOR, 72 HOUR, PHYS INTERP, Adult         Diabetes Education  Referral,         DISCONTINUED: Continuous Glucose  Sensor         (FREESTYLE GIULIANA 2 SENSOR) [facility]      Plan: TSH, T4 free        Most Recent Immunizations   Administered Date(s) Administered    COVID-19 12+ (Pfizer) 09/27/2024    COVID-19 Bivalent 12+ (Pfizer) 06/30/2023    COVID-19 MONOVALENT 12+ (Pfizer) 09/27/2024    COVID-19 Monovalent Booster 18+ (Moderna) 04/12/2022    COVID-19 Vaccine (Vi) 03/25/2021    DTaP, Unspecified 11/08/2010    Flu, Unspecified 11/15/2016    [id number] Novel Flu 11/04/2009    Hepatitis A (VAQTA)(ADULT 19+) 04/17/2019    Influenza (H1N1) 11/04/2009    Influenza (High Dose) Trivalent,PF (Fluzone) 09/27/2024    Influenza (IIV3) PF 10/01/2012    Influenza Vaccine 18-64 (Flublok) 10/15/2022    Influenza Vaccine 65+ (Fluzone HD) 10/04/2023, 10/04/2023    Influenza Vaccine >6 months,quad, PF 09/07/2017    Influenza Vaccine, 6+MO IM (QUADRIVALENT W/PRESERVATIVES) 11/15/2016    MMR (MMRII) 03/21/1995    Pneumococcal (PCV 7) 06/15/2022    Pneumococcal 20 valent Conjugate (Prevnar 20) 06/15/2022    Pneumococcal 23 valent 10/05/2012    RSV (Abrysvo) 10/11/2023    TD,PF 7+ (Tenivac) 03/21/1995    TDAP (Adacel,Boostrix) 09/24/2021    TDAP Vaccine (Adacel) 11/08/2010    Typhoid IM 10/16/2018    Zoster recombinant adjuvanted (Shingrix) 12/03/2020   Deferred Date(s) Deferred    COVID-19 12+ (Pfizer) 05/01/2025     About 40 minutes spent by me on the date of the encounter doing chart review, history and exam, documentation and further activities per the note  Discussed indications, risks and benefits of all medications prescribed, and answered questions to patient's satisfaction.  The longitudinal plan of care for the diagnosis(es)/condition(s) as documented were addressed during this visit. Due to the added complexity in care, I will continue to support Caio in the subsequent management and with ongoing continuity of care.  All questions were answered.  The patient indicates understanding of the above issues and agrees with the plan set  forth.    Follow-up:  As noted in AVS    Cindi Meraz MD  Endocrinology   [facility]/[place]  CC: Lisa Pierre

## 2025-06-04 NOTE — NURSING NOTE
"ENDOCRINOLOGY INTAKE FORM    Patient Name:  Steve Morgan  :  1960    Is patient Diabetic?   Yes: type 2  Does patient have non-diabetic or other endocrine issues?  Yes:   Class 2 severe obesity due to excess calories with serious comorbidity and body mass index (BMI) of 39.0 to 39.9 in adult (H)  Syndrome of inappropriate secretion of antidiuretic hormone  Dyslipidemia  Chronic hyponatremia  Hyperlipidemia LDL goal <100  Hypothyroidism       Vitals: There were no vitals taken for this visit.  BMI= There is no height or weight on file to calculate BMI.    Flu vaccine:  Yes:   Pneumonia vaccine:  Yes: 23 x 1, 20 x 1    Smoking and Alcohol use:  Social History     Tobacco Use    Smoking status: Never    Smokeless tobacco: Never    Tobacco comments:     Smoked for about 6 months when I was 16 but haven't touched them since   Vaping Use    Vaping status: Never Used   Substance Use Topics    Alcohol use: Yes     Comment: every 2-3 months    Drug use: Never       Foot Exam: Yes: 24  Eye Exam:  Yes: 24  Dental Exam:    Aspirin Use:  Yes: 81 mg    Lab Results   Component Value Date    A1C 9.9 2025    A1C 7.2 2025    A1C 7.9 10/21/2024    A1C 7.6 2024    A1C 8.0 2024    A1C 7.4 2021    A1C 9.9 2020    A1C 11.7 2019    A1C 11.6 2019    A1C 11.8 2018       Lab Results   Component Value Date    MICROL 57.3 2024    MICROL 84 2021    MICROL 14 2020     No results found for: \"MICROALBUMIN\"    Anisha Diop [facility] Endocrin[facility] [place]  [phone]        "

## 2025-06-04 NOTE — PROGRESS NOTES
Medication Therapy Management (MTM) Encounter    ASSESSMENT:                            Medication Adherence/Access: Sorted through medications with patient and disposed of old prescriptions.     Diarrhea/Gas  Recommend patient can try OTC simethicone and/or loperamide for symptoms. Must separate simethicone and levothyroxine by 4 hrs.     Diabetes  Patient is not meeting A1c goal of < 7%. Patient is not meeting goal of > 70% time in target with continuous glucose monitoring. Recommend utilizing lower end of insulin aspart dose when eating smaller meals to prevent blood sugars from going too low. Recommend following new insulin glargine and aspart doses set by Dr. Chavez yesterday with close follow-up. Recommend patient start Farxiga for blood glucose management and renal benefit; it appears this will be covered. Reviewed appropriate use, benefits, risks and monitoring at length.    Hypertension /CAD/Hx of NSTEMI (2025) s/p PCI/CKD G3/Chronic Anemia  Stable. Patient is meeting blood pressure goal of <130/80 mmHg. Recommend patient stop taking atenolol as previously instructed; this may help minimize dizziness.     Hyperlipidemia   LDL at goal <55 mg/dL. Continue current regimen.     GERD  Recommend patient stop taking pantoprazole, as patient has been stable without it and to prevent long term adverse effects.     Pain - Neuropathy  Stable on current regimen.     PLAN:                            Diarrhea  Talk to Sindhu about frequent gas/diarrhea tomorrow at visit.   Can try over the counter simethicone 125 mg four times daily as needed for gas (separate from levothyroxine dose by at least 4 hours)  Can try loperamide (Imodium) 2 mg four times daily as needed for diarrhea.     Diabetes  Continue to use insulin as prescribed by Dr. Meraz yesterday:   Lantus 56 units daily   Novolog   Breakfast: 14 units  Lunch: 12-14 units  Dinner 16 units  Start taking Farxiga 10 mg once daily - will recheck BMP in 4 weeks.  "    Hypertension:  Stop taking atenolol.     Hyperlipidemia:  Cholesterol looks great! Continue current regimen.     GERD  Do not restart pantoprazole.     Follow-up: Return in about 5 weeks (around 7/10/2025) for Medication Therapy Management.    SUBJECTIVE/OBJECTIVE:                          Caio Morgan is a 65 year old male seen for a follow-up visit.       Reason for visit: gas and diarrhea    Allergies/ADRs: Reviewed in chart  Past Medical History: Reviewed in chart  Tobacco: He reports that he has never smoked. He has never used smokeless tobacco.  Alcohol: A couple drinks per month     Medication Adherence/Access: Per patient, misses medication 1-2 time(s) per week. He is working on getting into a better routine with Brianne reminders and putting pill box in the kitchen. Patient has a few older medications he seems to be taking today and has several spilled medications in his medication bag.     Diarrhea/Gas  No current medications.     Patient reports frequent and bothersome gas and diarrhea. He often can't tell when it will be gas vs diarrhea, so is finding himself in the bathroom \"all day long.\" Thinks he may be having a BM 5x daily or more. Has not tried any medications for this. Has a colonoscopy scheduled for 09/2025, which was pushed out per cardiology request per patient.     Diabetes   Aspirin 81 mg daily - restarted last visit  Insulin aspart 12-14 units at breakfast and lunch and then 16 units at dinner  Insulin glargine 56 units daily - increased from 50 units yesterday     Current diabetes symptoms/side effects: hypoglycemia has some periods of lows in the afternoon; has readings that are around 65-90 mg/dL. Takes Novolog within 5-10 minutes with meal. If having a snack will do half of the typical dose of insulin.   Diet/Exercise: currently is eating three meals a day. Breakfast looks like bowl of cereal. Doesn't always get a snack for lunch (sandwich, carrots, chips). Dinner usually looks like " annette, some variety. Working closely with TOSHA Guzman on nutrition. Exercise limited by chest pain symptoms.   History: Jardiance (cost), Trulicity (cost), Mounjaro (cost), metformin (CKD and diarrhea), glipizide  Follows with Dr. Meraz endocrinology, last visit yesterday with changes to insulin.      Blood sugar monitoring: See attached  Eye exam is up to date  Foot exam is up to date            Hypertension /CAD/Hx of NSTEMI (2025) s/p PCI/CKD G3/Chronic Anemia  Amlodipine 5 mg tablet daily  Metoprolol succinate 25 mg daily  Atenolol 25 mg daily - this was supposed to be stopped in January 2025 when metoprolol was started, it appeared patient was NOT taking this at last visit  Prasugrel 10 mg daily  Nitroglycerin 0.4 mg SL tablet as needed  Isosorbide mononitrate 90 mg daily - has not yet started 30 mg capsule, so still taking just 60 mg daily; planning to  this week.   Aspirin 81 mg daily - restarted after last visit.   Sodium bicarbonate 325 mg twice daily - taking once daily only     Side effects: Dizziness. Denies new bruising/bleeding after restarting aspirin.  Per hospital discharge January 2025, supposed to be on DAPT x 6M. Patient reports still taking atenolol with metoprolol and experiences dizziness and fatigue. Reports not needing nitroglycerin because he is not having chest pain.   Previously reports he doesn't exactly have pain but does have episodes of chest tightness upon exertion where he feels SOB and starts dry heaving. This has happened only a couple times since hospitalization and used to happen much more frequently. Does not typically take nitroglycerin when this happens.   Is partaking in cardiac rehabilitation.  Following cardiology and nephrology. Has been getting iron infusions.      Hyperlipidemia   Rosuvastatin 40 mg daily  Ezetimibe 10 mg daily - started last visit    Patient reports no significant myalgias or other side effects.       GERD   Famotidine 40 mg  daily  Pantoprazole 40 mg daily - has not taken for 2 weeks, thinks he needs a refill.     Side effects: none  Pantoprazole started years ago, though last fill in September 2024. Patient wondering about refills today, though does not think he has taken this in over 2 weeks. Has not been having hearburn. Denies hx GI bleed or esophageal stricture.      Pain  - Neuropathy  Gabapentin 300 mg 3 times daily as needed - infrequent, not in a couple months  Acetaminophen 650 mg as needed - once every 3 weeks    Side effects: none. Pain has not been bothering him very much, so he hasn't needed anything.            Today's Vitals: /60   Pulse 59   Wt 244 lb 9.6 oz (110.9 kg)   SpO2 97%   BMI 40.70 kg/m    ----------------    I spent 40 minutes with this patient today. All changes were made via collaborative practice agreement with BRIDGET Cardoza CNP.     A summary of these recommendations was given to the patient.    Charley Pedraza  [facility]  4th Year Pharmacy Student     Elizabeth Olivas, PharmD   Medication Therapy Management   Pharmacy Resident    Steve Morgan was seen independently by Dr. Elizabeth Olivas.  I have reviewed and agree with the resident note and plan of care.      Lorena Noel, PharmD, BCACP  Medication Therapy Management Provider, [facility]  [phone]     Medication Therapy Recommendations  Diarrhea, unspecified type   1 Rationale: Untreated condition - Needs additional medication therapy - Indication   Recommendation: Start Medication - loperamide 2 MG capsule - Can also try simethicone for gas   Status: Accepted - no CPA Needed   Identified Date: 6/5/2025 Completed Date: 6/5/2025         Gastroesophageal reflux disease without esophagitis   1 Rationale: No medical indication at this time - Unnecessary medication therapy - Indication   Recommendation: Discontinue Medication - stop taking pantoprazole   Status: Accepted per CPA   Identified Date:  6/5/2025 Completed Date: 6/5/2025         NSTEMI (non-ST elevated myocardial infarction) (H)   1 Rationale: Does not understand instructions - Adherence - Adherence   Recommendation: Discontinue Medication - Stop taking atenolol   Status: Accepted - no CPA Needed   Identified Date: 6/5/2025 Completed Date: 6/5/2025         Type 2 diabetes mellitus with diabetic neuropathy (H)   1 Current Medication: insulin glargine 100 UNIT/ML pen   Current Medication Sig: Inject 56 Units subcutaneously daily. DOSE CHANGE do Not fill unless patient requests   Rationale: Synergistic therapy - Needs additional medication therapy - Indication   Recommendation: Start Medication - Farxiga 10 MG Tabs   Status: Accepted per CPA   Identified Date: 6/5/2025 Completed Date: 6/5/2025

## 2025-06-04 NOTE — LETTER
"6/4/2025      Steve Morgan  [id number]  [facility] [address]      Dear Colleague,    Thank you for referring your patient, Steve Morgan, to the [facility]. Please see a copy of my visit note below.    Name: Steve \"Caio\" Cathy  F/u for Diabetes and hypothyroidism.  HPI:   For f/u of DM.    has a past medical history of BPH (benign prostatic hyperplasia), Cellulitis and abscess of trunk (06/27/2017), Depression, Depressive disorder, Diverticular disease of colon, GERD (gastroesophageal reflux disease), Hyperlipidemia LDL goal <100 (10/31/2010), Hypertension goal BP (blood pressure) < 140/90 (03/17/2011), Hypothyroidism (01/12/2010), Morbid obesity due to excess calories (H) (01/12/2010), Neuropathy in diabetes (H) (01/12/2010), Obesity (01/12/2010), PVD (peripheral vascular disease), Sleep apnea (01/12/2010), Type 2 diabetes, HbA1C goal < 8% (H) (03/08/2011), and Vitamin B12 deficiency (non anemic).    He has challenges with blood sugar testing due to limited use of his hands (neuropathy+ contractures + tremor - both significant) and increasing difficulty with ADL's. Can't test his blood sugars using a meter due to the above.   He also has an hard time inserting the Nina sensors due to neuropathy and tremor.  He has stopping drinking pop since Sep 2021.  Exercise is limited.  CKD stage 3- followed by nephrology.  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  He underwent I&D ans/p partial amputation.  Earlier noted mild hyperkalemia which since has resolved.    Was is using nina 2 and is wondering if he can get a new prescription for nina 2+.  Diet- not focused on ADA diet  He was on Jardaince-- not taking as it was expensive. He reports that other SGLT-2 inh are also expensive.  History of intermittent hyperkalemia.   Appetite is OK.  Weight: stable  Wt Readings from Last 2 Encounters:   06/04/25 110.3 kg (243 lb 1.6 oz)   05/01/25 " 108.4 kg (239 lb)     1. Type 2 DM:  Orginally diagnosed at the age of 33 or 38.  Was prediabetic prior, was not particularly symptomatic at the time, was noted on routine lab work    Current Regimen:   Lantus 50 units at night  Novolog 12-14 units TID before each meal + correctional scale at bedtime 1:50>150(Estimating)      yes:     Diabetes Medication(s)       Insulin       insulin aspart (NOVOLOG PEN) 100 UNIT/ML pen 14-18 units before each meal plus a sliding scale of 1:50> 150. Est TDD: 75 units/day     insulin glargine 100 UNIT/ML pen Inject 50 Units subcutaneously daily. DOSE CHANGE do Not fill unless patient requests       Sulfonylureas       glipiZIDE (GLUCOTROL XL) 10 MG 24 hr tablet Take 10 mg by mouth daily.       Incretin Mimetic Agents       tirzepatide (MOUNJARO) 2.5 MG/0.5ML SOAJ auto-injector pen Inject 0.5 mLs (2.5 mg) subcutaneously once a week.              The plan was to start Trulicity but the copay was not affordable ($100 per refill).  Trulicity was prescribed by peter cole PA-C but it was expensive and he never started it.  Jardiance was expensive. Reports that other SGLT2 inh are also not covered by insurance.  Metformin on hold in the setting of CKD  Discontinued metformin and glipizide due to CKD during 1/2025 hospitalization  Mounjaro- was expensive, never started      BS checks: Nina  Nina Download: Blood glucose data reviewed personally. See nursing note from this encounter for details.  Unable to use glucose meter - he has a hard time doing fingerstick testing due to his tremor.    Complications:   Diabetes Complications  Description / Detail    Diabetic Retinopathy  No retinopathy,last exam 2/2020, [facility]   CAD / PAD  No   Neuropathy  Yes + diabetic neuropathy: numbness hands and feet.  Saw podiatry, Dr. Mathis, 1/27/2018- using diabetic shoes   Nephropathy / Microalbuminuria  Yes, elevated urine microalbumin   Gastroparesis  No   Hypoglycemia [place]  Not sure, gets 'kind of  dizzy' when blood sugar is low but reports history of low blood sugars without symptoms     2. Hypertension: Blood Pressure:   BP Readings from Last 3 Encounters:   06/04/25 (!) 145/69   05/01/25 130/60   04/10/25 126/64   Blood pressure medications include atenolol 25 mg qd and losartan 50 mg every day.   3. Hyperlipidemia: Takes simvastatin 20 mg for lipid control.       4.  Hypothyroidism. Currently treated with levoxyl (KAMERON) 137 mcg daily. Takes the levoxyl first thing in the morning and waits 30+ minutes before eating breakfast.   PMH/PSH:  Past Medical History:   Diagnosis Date     BPH (benign prostatic hyperplasia)      Cellulitis and abscess of trunk 06/27/2017     Depression      Depressive disorder      Diverticular disease of colon      GERD (gastroesophageal reflux disease)      Hyperlipidemia LDL goal <100 10/31/2010     Hypertension goal BP (blood pressure) < 140/90 03/17/2011     Hypothyroidism 01/12/2010     Morbid obesity due to excess calories (H) 01/12/2010     Neuropathy in diabetes (H) 01/12/2010     Obesity 01/12/2010     PVD (peripheral vascular disease)      Sleep apnea 01/12/2010    CPAP     Type 2 diabetes, HbA1C goal < 8% (H) 03/08/2011     Vitamin B12 deficiency (non anemic)      Past Surgical History:   Procedure Laterality Date     BIOPSY       BURSECTOMY KNEE Right 05/20/2024    Procedure: Excisional prepatellar bursectomy, right knee;  Surgeon: Justin Bo MD;  Location:  OR     COLONOSCOPY       COSMETIC SURGERY       CV CORONARY ANGIOGRAM N/A 1/10/2025    Procedure: Coronary Angiogram;  Surgeon: Magan Gallardo MD;  Location:  HEART CARDIAC CATH LAB     CV INTRAVASULAR ULTRASOUND N/A 1/14/2025    Procedure: Intravascular Ultrasound;  Surgeon: Magan Gallardo MD;  Location:  HEART CARDIAC CATH LAB     CV PCI STENT DRUG ELUTING N/A 1/14/2025    Procedure: Percutaneous Coronary Intervention Stent;  Surgeon: Magan Gallardo MD;  Location:   HEART CARDIAC CATH LAB     EYE SURGERY Bilateral     Catracts     GENITOURINARY SURGERY      surg for undescended testicle     IRRIGATION AND DEBRIDEMENT HAND, COMBINED Right 12/23/2022    Procedure: Right long finger irrigation and debridement with partial amputation of the right long finger. ;  Surgeon: Colby English MD;  Location: [facility] OR     REPAIR HAMMER TOE BILATERAL  05/16/2013    Procedure: REPAIR HAMMER TOE BILATERAL;  Flexor Tenotomy Toes 2,3,4,5 Bilateral Feet;  Surgeon: Saad Bangura DPM;  Location: [facility] OR     Family Hx:  Family History   Problem Relation Age of Onset     Breast Cancer Mother      Hypertension Mother      Thyroid Disease Mother      Depression Mother      Alzheimer Disease Mother 82     Obesity Mother      Cancer - colorectal Father      Thyroid Disease Father      Depression Father      Other - See Comments Father         bladder polyps     Prostate Cancer Father      Other Cancer Father      Diabetes Brother         Brother     Depression Brother      Diabetes Brother      Asthma Brother      Depression Brother      Anxiety Disorder Brother      Mental Illness Brother      Heart Disease Maternal Grandmother         CHF     Circulatory Paternal Grandmother      Cancer Paternal Grandfather      No Known Problems Daughter      No Known Problems Son      Diabetes Other         Great Aunt     Thyroid disease: Yes: mother         DM2: Yes: one brother with type 1 diabetes and one brother with type 2 diabetes, paternal aunt with DM2         Autoimmune: DM1, SLE, RA, Vitiligo Yes: brother with DM1    Social Hx:  Social History     Socioeconomic History     Marital status:      Spouse name: Sri     Number of children: 2     Years of education: Not on file     Highest education level: Associate degree: occupational, technical, or vocational program   Occupational History     Occupation:      Employer: NONE      Comment: [employer]   Tobacco Use     Smoking status:  Never     Smokeless tobacco: Never     Tobacco comments:     Smoked for about 6 months when I was 16 but haven't touched them since   Vaping Use     Vaping status: Never Used   Substance and Sexual Activity     Alcohol use: Yes     Comment: every 2-3 months     Drug use: Never     Sexual activity: Not Currently     Partners: Female     Birth control/protection: Abstinence   Other Topics Concern     Parent/sibling w/ CABG, MI or angioplasty before 65F 55M? Yes   Social History Narrative     Not on file     Social Drivers of Health     Financial Resource Strain: Low Risk  (6/1/2025)    Financial Resource Strain      Within the past 12 months, have you or your family members you live with been unable to get utilities (heat, electricity) when it was really needed?: No   Food Insecurity: Low Risk  (6/1/2025)    Food Insecurity      Within the past 12 months, did you worry that your food would run out before you got money to buy more?: No      Within the past 12 months, did the food you bought just not last and you didn t have money to get more?: No   Transportation Needs: Low Risk  (6/1/2025)    Transportation Needs      Within the past 12 months, has lack of transportation kept you from medical appointments, getting your medicines, non-medical meetings or appointments, work, or from getting things that you need?: No   Physical Activity: Insufficiently Active (6/1/2025)    Exercise Vital Sign      Days of Exercise per Week: 2 days      Minutes of Exercise per Session: 50 min   Stress: No Stress Concern Present (6/1/2025)    [employer] - Occupational Stress Questionnaire      Feeling of Stress : Only a little   Social Connections: Unknown (6/1/2025)    Social Connection and Isolation Panel [NHANES]      Frequency of Communication with Friends and Family: Not on file      Frequency of Social Gatherings with Friends and Family: Once a week      Attends Yarsanism Services: Not on file       "Active Member of Clubs or Organizations: Not on file      Attends Club or Organization Meetings: Not on file      Marital Status: Not on file   Interpersonal Safety: Low Risk  (1/14/2025)    Interpersonal Safety      Do you feel physically and emotionally safe where you currently live?: Yes      Within the past 12 months, have you been hit, slapped, kicked or otherwise physically hurt by someone?: No      Within the past 12 months, have you been humiliated or emotionally abused in other ways by your partner or ex-partner?: No   Housing Stability: Low Risk  (6/1/2025)    Housing Stability      Do you have housing? : Yes      Are you worried about losing your housing?: No          MEDICATIONS:  has a current medication list which includes the following prescription(s): acetaminophen, amlodipine, aspirin, bisacodyl, blood glucose, blood glucose monitoring, calcium carb-cholecalciferol, freestyle juan 2 sensor, ezetimibe, finasteride, gabapentin, glipizide, insulin aspart, insulin glargine, insulin pen needle, b-d u/f, isosorbide mononitrate, isosorbide mononitrate, lactobacillus rhamnosus (gg), latanoprost, levoxyl, metoprolol succinate er, mirtazapine, multi-vitamin, nitroglycerin, polyethylene glycol, prasugrel, rosuvastatin, senna-docusate, sodium bicarbonate, thin, tirzepatide, and vilazodone.    ROS     ROS: 10 point ROS neg other than the symptoms noted above in the HPI.    PE:  BP (!) 145/69 (BP Location: Left arm, Patient Position: Chair, Cuff Size: Adult Regular)   Pulse 62   Temp 98.1  F (36.7  C) (Tympanic)   Resp 18   Ht 1.651 m (5' 5\")   Wt 110.3 kg (243 lb 1.6 oz)   SpO2 98%   BMI 40.45 kg/m    GENERAL: healthy, alert and no distress  EYES: Eyes grossly normal to inspection, conjunctivae and sclerae normal  ENT: no nose swelling, nasal discharge.  Thyroid: no apparent thyroid nodules.  Thyroid appears normal in size and nontender.  CV: RRR, no rubs, gallops, no murmurs  RESP: CTAB, no wheezes, " rales, or ronchi  ABDO: +BS  EXTREMITIES: no hand tremors.  NEURO: Cranial nerves grossly intact, mentation intact and speech normal  SKIN: No apparent skin lesions, rash or edema seen   PSYCH: mentation appears normal, affect normal/bright, judgement and insight intact, normal speech and appearance well-groomed    LABS:    Component      Latest Ref Rng & Units 8/29/2019   Glucose 316   C-Peptide      0.9 - 6.9 ng/mL 2.5     A1c:  Lab Results   Component Value Date    A1C 9.9 03/28/2025    A1C 7.2 01/06/2025    A1C 7.9 10/21/2024    A1C 7.6 07/09/2024    A1C 8.0 05/14/2024    A1C 7.4 02/22/2021    A1C 9.9 03/12/2020    A1C 11.7 12/05/2019    A1C 11.6 08/29/2019    A1C 11.8 12/14/2018     Basic Metabolic Panel:  Creatinine   Date Value Ref Range Status   03/28/2025 1.77 (H) 0.67 - 1.17 mg/dL Final   01/16/2020 1.01 0.66 - 1.25 mg/dL Final     Urine microabumin:  Lab Results   Component Value Date    UMALCR 151.99 05/16/2024    UMALCR 120.00 07/30/2021    UMALCR 81.46 03/05/2020           LFTS/Cholesterol Panel:  Recent Labs   Lab Test 04/11/25  0919 01/15/25  0427   CHOL 125 116   HDL 30* 29*   LDL 74 51   TRIG 103 179*       Thyroid Function:   Latest Ref Rng 8/26/2024  2:02 PM   ENDO THYROID LABS-[facility]     TSH 0.30 - 4.20 uIU/mL 2.37    Free T3 2.3 - 4.2 pg/mL    FREE T4 0.90 - 1.70 ng/dL 1.47        Component    Latest Ref Rng & Units 10/19/2018   Thyroid Peroxidase Antibody    <35 IU/mL 11   Thyroglobulin Antibody    <40 IU/mL <20   Thyroid Stim Immunog    <=1.3 TSI index <1.0     All pertinent notes, labs, and images personally reviewed by me.     A/P  Mr.Walter Morgan is a 65 year old evaluated via phone visit for the management of:    1. DM2 - Under poor control. A1c 69.9%.  Diabetes is complicated by neuropathy and nephropathy/elevated urine microalbumin. H/o multivessel CAD s/p RCA/RPDA PCI in 1/2025   He is followed by nephrology.  Can't test his blood sugars using a meter due to neuropathy/hand contractures   and will benefit from use of continuous glucose monitor.  Noted post dinner high BG. Overall Bg are high and average B G is204  Plan:  Discussed diagnosis, pathophysiology, management and treatment options of condition with pt.  I also discussed importance of strict blood sugar control to prevent complications associated with uncontrolled diabetes.    Increase Lantus to 56 units/day   Increase Novolog by 2 units at dinner-- take 16 units at dinner  Rest regimen same  Nina 2 plus Rx sent.  Discussed Jardiance in general.  Check cost of Jardiance with insurance.  He can benefit from use of SGLT2 medication.  Of note history of hyperlipidemia so will need careful monitoring.    Follow up with CDE for diet education.  Follow up with MTM as scheduled. Recommend close follow up.  Dual medication like Soliqua was considered cleared by CDE but currently using it insulin at home and will like to continue that.      Follow up with Cara SMALLS in 4-5 months with labs prior.  Repeat labs and follow up with  in 4-5 month after that.  Please make a lab appointment for blood work and follow up clinic appointment in 1 week after that to discuss results.      2. Hypothyroidism.  Currently treated with Levoxyl (KAMERON) 137 mcg/day.  Clinically and biochemically euthyroid.   8/2024 Labs in range.  Plan:  Discussed diagnosis, pathophysiology, management and treatment options of condition with pt.  Continue Levoxyl 137 mcg/day.   Labs in few months or sooner if concerns.    Discussed s/s of hypothyroidism and hyperthyroidism to watch for.  The patient indicates understanding of these issues and agrees with the plan.    3. Hyperlipidemia - On statin therapy. . Managed by PCP. Not discussed.    4.  Hypertension--on amlodipine, metoprolol    5. Prevention:  Opthalmology-Yes: recommend annually  Dental-Yes:recommend twice a year  ASA-Yes, 81 mg qd   Smoking- No      Plan: insulin glargine 100 UNIT/ML pen, Continuous          Glucose Sensor (FREESTYLE GIULIANA 2 PLUS SENSOR)         MISC, Albumin Random Urine Quantitative with         Creat Ratio, Creatinine, Hemoglobin A1c,         GLUCOSE MONITOR, 72 HOUR, PHYS INTERP, Adult         Diabetes Education  Referral,         DISCONTINUED: Continuous Glucose Sensor         (FREESTYLE GIULIANA 2 SENSOR[place] [facility]      Plan: TSH, T4 free        Most Recent Immunizations   Administered Date(s) Administered     COVID-19 12+ (Pfizer) 09/27/2024     COVID-19 Bivalent 12+ (Pfizer) 06/30/2023     COVID-19 MONOVALENT 12+ (Pfizer) 09/27/2024     COVID-19 Monovalent Booster 18+ (Moderna) 04/12/2022     COVID-19 Vaccine (Vi) 03/25/2021     DTaP, Unspecified 11/08/2010     Flu, Unspecified 11/15/2016     T3h7-34 Novel Flu 11/04/2009     Hepatitis A (VAQTA)(ADULT 19+) 04/17/2019     Influenza (H1N1) 11/04/2009     Influenza (High Dose) Trivalent,PF (Fluzone) 09/27/2024     Influenza (IIV3) PF 10/01/2012     Influenza Vaccine 18-64 (Flublok) 10/15/2022     Influenza Vaccine 65+ (Fluzone HD) 10/04/2023, 10/04/2023     Influenza Vaccine >6 months,quad, PF 09/07/2017     Influenza Vaccine, 6+MO IM (QUADRIVALENT W/PRESERVATIVES) 11/15/2016     MMR (MMRII) 03/21/1995     Pneumococcal (PCV 7) 06/15/2022     Pneumococcal 20 valent Conjugate (Prevnar 20) 06/15/2022     Pneumococcal 23 valent 10/05/2012     RSV (Abrysvo) 10/11/2023     TD,PF 7+ (Tenivac) 03/21/1995     TDAP (Adacel,Boostrix) 09/24/2021     TDAP Vaccine (Adacel) 11/08/2010     Typhoid IM 10/16/2018     Zoster recombinant adjuvanted (Shingrix) 12/03/2020   Deferred Date(s) Deferred     COVID-19 12+ (Pfizer) 05/01/2025     About 40 minutes spent by me on the date of the encounter doing chart review, history and exam, documentation and further activities per the note  Discussed indications, risks and benefits of all medications prescribed, and answered questions to patient's satisfaction.  The longitudinal plan of care for the  diagnosis(es)/condition(s) as documented were addressed during this visit. Due to the added complexity in care, I will continue to support Caio in the subsequent management and with ongoing continuity of care.  All questions were answered.  The patient indicates understanding of the above issues and agrees with the plan set forth.    Follow-up:  As noted in AVS    Cindi Meraz MD  Endocrinology   [facility]  CC: Lisa Pierre          Again, thank you for allowing me to participate in the care of your patient.        Sincerely,        Cindi Meraz MD    Electronically signed

## 2025-06-05 ENCOUNTER — RESULTS FOLLOW-UP (OUTPATIENT)
Dept: PHARMACY | Facility: CLINIC | Age: 65
End: 2025-06-05

## 2025-06-05 ENCOUNTER — OFFICE VISIT (OUTPATIENT)
Dept: PHARMACY | Facility: CLINIC | Age: 65
End: 2025-06-05
Payer: COMMERCIAL

## 2025-06-05 ENCOUNTER — PATIENT OUTREACH (OUTPATIENT)
Dept: CARE COORDINATION | Facility: CLINIC | Age: 65
End: 2025-06-05
Payer: MEDICARE

## 2025-06-05 VITALS
WEIGHT: 244.6 LBS | HEART RATE: 59 BPM | OXYGEN SATURATION: 97 % | BODY MASS INDEX: 40.7 KG/M2 | SYSTOLIC BLOOD PRESSURE: 120 MMHG | DIASTOLIC BLOOD PRESSURE: 60 MMHG

## 2025-06-05 DIAGNOSIS — N18.31 STAGE 3A CHRONIC KIDNEY DISEASE (H): ICD-10-CM

## 2025-06-05 DIAGNOSIS — E11.40 TYPE 2 DIABETES MELLITUS WITH DIABETIC NEUROPATHY, WITH LONG-TERM CURRENT USE OF INSULIN (H): Primary | ICD-10-CM

## 2025-06-05 DIAGNOSIS — D50.8 OTHER IRON DEFICIENCY ANEMIA: ICD-10-CM

## 2025-06-05 DIAGNOSIS — Z79.4 TYPE 2 DIABETES MELLITUS WITH DIABETIC NEUROPATHY, WITH LONG-TERM CURRENT USE OF INSULIN (H): Primary | ICD-10-CM

## 2025-06-05 DIAGNOSIS — I10 PRIMARY HYPERTENSION: ICD-10-CM

## 2025-06-05 DIAGNOSIS — K21.9 GASTROESOPHAGEAL REFLUX DISEASE WITHOUT ESOPHAGITIS: ICD-10-CM

## 2025-06-05 DIAGNOSIS — E11.42 DIABETIC POLYNEUROPATHY ASSOCIATED WITH TYPE 2 DIABETES MELLITUS (H): ICD-10-CM

## 2025-06-05 DIAGNOSIS — R19.7 DIARRHEA, UNSPECIFIED TYPE: ICD-10-CM

## 2025-06-05 DIAGNOSIS — R14.3 EXCESSIVE GAS: ICD-10-CM

## 2025-06-05 DIAGNOSIS — I25.118 CORONARY ARTERY DISEASE OF NATIVE ARTERY OF NATIVE HEART WITH STABLE ANGINA PECTORIS: ICD-10-CM

## 2025-06-05 DIAGNOSIS — E78.5 DYSLIPIDEMIA: ICD-10-CM

## 2025-06-05 DIAGNOSIS — I21.4 NSTEMI (NON-ST ELEVATED MYOCARDIAL INFARCTION) (H): ICD-10-CM

## 2025-06-05 PROCEDURE — 3074F SYST BP LT 130 MM HG: CPT

## 2025-06-05 PROCEDURE — 3078F DIAST BP <80 MM HG: CPT

## 2025-06-05 PROCEDURE — 99207 PR NO CHARGE LOS: CPT

## 2025-06-05 RX ORDER — FAMOTIDINE 40 MG/1
40 TABLET, FILM COATED ORAL DAILY
COMMUNITY

## 2025-06-05 RX ORDER — SIMETHICONE 125 MG
125 TABLET,CHEWABLE ORAL 4 TIMES DAILY PRN
COMMUNITY

## 2025-06-05 RX ORDER — LOPERAMIDE HYDROCHLORIDE 2 MG/1
2 CAPSULE ORAL 4 TIMES DAILY PRN
COMMUNITY

## 2025-06-05 RX ORDER — DAPAGLIFLOZIN 10 MG/1
10 TABLET, FILM COATED ORAL DAILY
Qty: 90 TABLET | Refills: 1 | Status: SHIPPED | OUTPATIENT
Start: 2025-06-05

## 2025-06-05 RX ORDER — MULTIVIT WITH MINERALS/LUTEIN
1000 TABLET ORAL DAILY
COMMUNITY

## 2025-06-05 NOTE — PATIENT INSTRUCTIONS
"Recommendations from today's MTM visit:                                                        Diarrhea  Talk to Sindhu about frequent gas/diarrhea tomorrow at visit.   Can try over the counter simethicone 125 mg four times daily as needed for gas (separate from levothyroxine dose by at least 4 hours)  Can try loperamide (Imodium) 2 mg four times daily as needed for diarrhea.     Diabetes  Continue to use insulin as prescribed by Dr. Meraz yesterday:   Lantus 56 units daily   Novolog   Breakfast: 14 units  Lunch: 12-14 units  Dinner 16 units  Start taking Farxiga 10 mg once daily - will recheck BMP in 4 weeks.     Hypertension:  Stop taking atenolol.     Hyperlipidemia:  Cholesterol looks great! Continue current regimen.       Follow-up: 1 month with Lorena    It was great speaking with you today.  I value your experience and would be very thankful for your time in providing feedback in our clinic survey. In the next few days, you may receive an email or text message from Jenn Rykert with a link to a survey related to your  clinical pharmacist.\"     To schedule another MTM appointment, please call the clinic directly or you may call the MTM scheduling line at [phone] or toll-free at [phone].     My Clinical Pharmacist's contact information:                                                      Please feel free to contact me with any questions or concerns you have.      Elizabeth Olivas, PharmD   Medication Therapy Management   Pharmacy Resident    Direct: [phone]  [facility]: [phone]  [facility]: [phone]    "

## 2025-06-06 ENCOUNTER — HOSPITAL ENCOUNTER (OUTPATIENT)
Dept: CARDIAC REHAB | Facility: CLINIC | Age: 65
Discharge: HOME | End: 2025-06-06
Attending: INTERNAL MEDICINE
Payer: MEDICARE

## 2025-06-06 PROBLEM — L98.492 SKIN ULCER OF HAND WITH FAT LAYER EXPOSED (H): Status: RESOLVED | Noted: 2021-07-30 | Resolved: 2025-06-06

## 2025-06-06 PROCEDURE — 93798 PHYS/QHP OP CAR RHAB W/ECG: CPT

## 2025-06-09 ENCOUNTER — PATIENT OUTREACH (OUTPATIENT)
Dept: CARE COORDINATION | Facility: CLINIC | Age: 65
End: 2025-06-09
Payer: MEDICARE

## 2025-06-09 ENCOUNTER — RESULTS FOLLOW-UP (OUTPATIENT)
Dept: FAMILY MEDICINE | Facility: CLINIC | Age: 65
End: 2025-06-09
Payer: MEDICARE

## 2025-06-11 NOTE — TELEPHONE ENCOUNTER
"RN called and spoke with patient-     Relayed message from BRIDGET Cardoza CNP (see below). Patient was given an opportunity to ask questions, verbalized understanding of plan, and is agreeable.     \"Your PSA (prostate cancer blood test)  is normal.   Celiac test was normal  Please complete the stool tests\"    Safia ARITA RN  [facility]   "

## 2025-06-11 NOTE — TELEPHONE ENCOUNTER
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-CM) - Contracture of hand  E11.42 (ICD-10-CM) - Diabetic polyneuropathy associated with type 2 diabetes mellitus (H)    PROVIDER: Josh An DO   DATE OF APPT: 9/5/25   NOTES (FOR ALL VISITS) STATUS DETAILS   OFFICE NOTE from referring provider Internal 3/24/25 Trevon Rivera MBBS @[facility]     OFFICE NOTE from other specialist Internal 6/6/25 Sindhu Mohan APRN CNP @[facility]    6/5/25, 5/1/25 Rashmi Olivas, RP @[facility] [place]    6/4/25 Cindi Meraz MD @[facility] Endo    1/29/25 Cara Stauffer PA-C @[facility] Endo    1/6/25 Stefany Chan PA-C @[facility]    7/9/24 Lisa Pierre PA-C @[facility]    See Additional Encounters   MEDICATION LIST Internal    IMAGING  (FOR ALL VISITS)     ULTRASOUND (CAROTID BILAT) *VASCULAR* Internal [facility]  1/12/25 US Upper Ext Arterial Duplex Left  1/12/25 US Carotid Bilat     CT (HEAD, NECK, SPINE) Internal [facility]  10/21/24 CT Head

## 2025-06-13 ENCOUNTER — HOSPITAL ENCOUNTER (OUTPATIENT)
Dept: CARDIAC REHAB | Facility: CLINIC | Age: 65
Discharge: HOME | End: 2025-06-13
Attending: INTERNAL MEDICINE
Payer: MEDICARE

## 2025-06-13 PROCEDURE — 93798 PHYS/QHP OP CAR RHAB W/ECG: CPT

## 2025-06-23 ENCOUNTER — PATIENT OUTREACH (OUTPATIENT)
Dept: CARE COORDINATION | Facility: CLINIC | Age: 65
End: 2025-06-23

## 2025-06-25 ENCOUNTER — HOSPITAL ENCOUNTER (OUTPATIENT)
Dept: CARDIAC REHAB | Facility: CLINIC | Age: 65
Discharge: HOME | End: 2025-06-25
Attending: INTERNAL MEDICINE
Payer: MEDICARE

## 2025-06-25 PROCEDURE — 93798 PHYS/QHP OP CAR RHAB W/ECG: CPT

## 2025-06-26 DIAGNOSIS — N18.32 CHRONIC KIDNEY DISEASE (CKD) STAGE G3B/A1, MODERATELY DECREASED GLOMERULAR FILTRATION RATE (GFR) BETWEEN 30-44 ML/MIN/1.73 SQUARE METER AND ALBUMINURIA CREATININE RATIO LESS THAN 30 MG/G (H): Primary | ICD-10-CM

## 2025-06-27 ENCOUNTER — HOSPITAL ENCOUNTER (OUTPATIENT)
Dept: CARDIAC REHAB | Facility: CLINIC | Age: 65
Discharge: HOME | End: 2025-06-27
Attending: INTERNAL MEDICINE
Payer: MEDICARE

## 2025-06-27 PROCEDURE — 93798 PHYS/QHP OP CAR RHAB W/ECG: CPT | Performed by: REHABILITATION PRACTITIONER

## 2025-06-30 ENCOUNTER — TELEPHONE (OUTPATIENT)
Dept: CARDIOLOGY | Facility: CLINIC | Age: 65
End: 2025-06-30
Payer: MEDICARE

## 2025-06-30 DIAGNOSIS — I25.118 CORONARY ARTERY DISEASE OF NATIVE ARTERY OF NATIVE HEART WITH STABLE ANGINA PECTORIS: ICD-10-CM

## 2025-06-30 DIAGNOSIS — I21.4 NSTEMI (NON-ST ELEVATED MYOCARDIAL INFARCTION) (H): ICD-10-CM

## 2025-06-30 NOTE — TELEPHONE ENCOUNTER
"Spoke with patient and he stated that Friday 6/27 at cardiac rehab he had the same little \"mini heart attack\" episode of gaging/feeling like he needs to vomit but doesn't. He had these episodes prior to his stents in January and then they resolved once he got the stents. Pt stated he has not had any episodes until Friday and has not had any since but he wanted to let cardiology know in case he should get any testing or anything done. Denies SOB or chest pain. Pt is scheduled to see [facility] 7/11/25.     Dr. Gallardo is out of the office this week and Jacqueline out for 2 days. RN discussed with patient that if he develops any SOB or chest pain to go to the ER. If he start developing more of the \"little mini heart attack\" episodes to go to the ER otherwise will route this to [facility] and Dr. Glalardo to review. Pt agreed with plan.   "

## 2025-06-30 NOTE — TELEPHONE ENCOUNTER
[facility] Call Center    Phone Message    May a detailed message be left on voicemail: yes     Reason for Call: Other: Pt would like a call back to discuss his symptoms he had during a cardiac rehab work out, he stated he had started to gag and felt as if he was going to vomit but nothing came out and it was hard to catch his breath during the gagging, he stated this was the symptom he had prior to his little mini heart attacks and would like to discuss his concerns      Action Taken: Other: Cardio    Travel Screening: Not Applicable     Date of Service:

## 2025-07-02 RX ORDER — ISOSORBIDE MONONITRATE 60 MG/1
120 TABLET, EXTENDED RELEASE ORAL DAILY
Qty: 180 TABLET | Refills: 3 | Status: SHIPPED | OUTPATIENT
Start: 2025-07-02

## 2025-07-02 NOTE — TELEPHONE ENCOUNTER
Looks like his BP runs high at rehab.  This could lead to some angina.   He can trial increasing Imdur to 120 mg.   Then I willl see him 7/11/25 and decided if we should repeat cor angio.     If he has significant sxs between now and then, he should be seen in the ER.     Thanks,   Jacqueline Holloway PA-C 7/2/2025 1:55 PM

## 2025-07-02 NOTE — TELEPHONE ENCOUNTER
Spoke with patient and reviewed Ana's recommendations. Pt agreeable to try Imdur 120 mg daily and confirmed OV 7/11/25. Pt aware to go to ER in meantime if he develops any chest pain, sob or increased symptoms.

## 2025-07-09 ENCOUNTER — HOSPITAL ENCOUNTER (OUTPATIENT)
Dept: CARDIAC REHAB | Facility: CLINIC | Age: 65
Discharge: HOME | End: 2025-07-09
Attending: INTERNAL MEDICINE
Payer: MEDICARE

## 2025-07-09 VITALS — WEIGHT: 235 LBS | BODY MASS INDEX: 39.15 KG/M2 | HEIGHT: 65 IN

## 2025-07-09 LAB
GLUCOSE BLDC GLUCOMTR-MCNC: 107 MG/DL (ref 70–99)
GLUCOSE BLDC GLUCOMTR-MCNC: 80 MG/DL (ref 70–99)
GLUCOSE BLDC GLUCOMTR-MCNC: 83 MG/DL (ref 70–99)

## 2025-07-09 PROCEDURE — 93798 PHYS/QHP OP CAR RHAB W/ECG: CPT

## 2025-07-09 PROCEDURE — 93797 PHYS/QHP OP CAR RHAB WO ECG: CPT

## 2025-07-09 NOTE — PROGRESS NOTES
"NUTRITION ASSESSMENT & EDUCATION NOTE    REASON FOR ASSESSMENT  Steve Morgan is a 65 year old male seen by Registered Dietitian for 1:1 Cardiac Rehab consult     NUTRITION HISTORY  Information obtained from patient, chart  Patient has been instructed to follow a low sodium, low potassium diet.  Previous diet education: sees a JITENDRA  Patient has attended Weight Loss class provided by cardiac rehab  Nutrition-related goals: weight loss, maintain kidney health, manage blood sugars  Grocery shopping, cooking: pts wife cooks and they both grocery shop  Changes to diet since cardiac event: stopped eating dry cereal for breakfast in the morning  Barriers to dietary changes: likes to watch TV, is used to eating higher carb food options    ANTHROPOMETRICS  Height: 5' 5\"  Weight: 235 lbs 0 oz  Body mass index is 39.11 kg/m .  Weight Status:  Obesity Grade II BMI 35-39.9  IBW: 61.5kg  % IBW: 173%  Weight History:   Wt Readings from Last 30 Encounters:   07/09/25 106.6 kg (235 lb)   06/06/25 111.1 kg (244 lb 14.4 oz)   06/05/25 110.9 kg (244 lb 9.6 oz)   06/04/25 110.3 kg (243 lb 1.6 oz)   05/01/25 108.4 kg (239 lb)   04/10/25 107.4 kg (236 lb 11.2 oz)   03/24/25 106.4 kg (234 lb 8 oz)   01/23/25 108.9 kg (240 lb)   01/15/25 104.7 kg (230 lb 14.4 oz)   01/09/25 106.1 kg (233 lb 12.8 oz)   01/06/25 108 kg (238 lb)   12/12/24 109.3 kg (241 lb)   11/27/24 109.3 kg (241 lb)   11/21/24 110.7 kg (244 lb)   09/09/24 107.5 kg (237 lb)   07/09/24 109.4 kg (241 lb 3.2 oz)   06/18/24 113.4 kg (250 lb)   05/17/24 113.4 kg (250 lb)   11/28/23 109.3 kg (241 lb)   11/17/23 108.9 kg (240 lb)   09/06/23 108.1 kg (238 lb 4.8 oz)   07/11/23 106.1 kg (234 lb)   06/30/23 108.8 kg (239 lb 12.8 oz)   05/04/23 107.6 kg (237 lb 3.2 oz)   05/03/23 107 kg (236 lb)   03/10/23 105.7 kg (233 lb 2 oz)   02/15/23 105.4 kg (232 lb 4.8 oz)   01/27/23 107.8 kg (237 lb 9.6 oz)   01/17/23 105.7 kg (233 lb)   01/11/23 105.9 kg (233 lb 6.4 oz)         ASSESSED " NUTRITION NEEDS PER APPROVED PRACTICE GUIDELINES:  Estimated Energy Needs: 1596-8220 kcals ([place] Jeor)  Justification: obese and activity factor 1-1.2  Estimated Protein Needs: 62-74 grams protein (1-1.2 g pro/Kg)  Justification: obesity guidelines  and CKD and IBW  Estimated Fluid Needs: 0401-6038  mL (25-30 mL/kg)  Justification: maintenance    NUTRITION DIAGNOSIS  Food- and nutrition- related knowledge deficit related to balanced meals for heart health and diabetes as evidenced by    INTERVENTIONS  Nutrition Prescription:  Cardiac diet:   Low saturated fat, Na <2400 mg  Fiber >25 g per day  Emphasis on plate method for balanced meals     Implementation  Assessed learning needs and learning preference.  Nutrition Education (Content):  Provided handouts:  Plate Method for Diabetes  Potassium Content of Foods  High-Protein Foods List  Carbohydrate Counting for People with Diabetes  2,000-Calorie Sample Meal Plan  1,800-Calorie 5-Day Menus  Phytonutrient spectrum of foods  [place] Healthy Eating Plate  Discussed heart healthy diet recommendations, including label reading, minimizing added salts/saturated fats/sugars, balanced meals TID, at least two food groups per snack, heart healthy substitutes, eliminating all trans fat, sugar free beverages  Nutrition Education (Application):  Discussed eating habits and recommended alternative food choices:  Emphasized fruits, vegetables, low sodium nuts/heart healthy fats, fish, low fat dairy  Reviewed recipes and new food ideas such as beans, lentils, vegetables and tofu for an increase in plant based options   Encouraged water and non sugar sweetened beverages   Discussed cooking more from scratch to monitor meal ingredients, portions, menu choices     Goals/Follow up/Monitoring For Cardiac Rehab Staff  Adherence to nutrition related recommendations, follow with cardiac rehab  60g CHO per meal, 7655-6804 Kcals daily, 62-74g protein daily  Additional questions/concerns  related to heart healthy guidelines      Jona Hankins MS,RDN,LD,CNSC  [facility]  Office: [phone]    DIRECT PATIENT CARE TIME: 30 MINUTES

## 2025-07-10 ENCOUNTER — OFFICE VISIT (OUTPATIENT)
Dept: PHARMACY | Facility: CLINIC | Age: 65
End: 2025-07-10
Payer: COMMERCIAL

## 2025-07-10 ENCOUNTER — LAB (OUTPATIENT)
Dept: LAB | Facility: CLINIC | Age: 65
End: 2025-07-10
Payer: MEDICARE

## 2025-07-10 VITALS — WEIGHT: 239.4 LBS | DIASTOLIC BLOOD PRESSURE: 64 MMHG | BODY MASS INDEX: 39.84 KG/M2 | SYSTOLIC BLOOD PRESSURE: 120 MMHG

## 2025-07-10 DIAGNOSIS — D50.8 OTHER IRON DEFICIENCY ANEMIA: ICD-10-CM

## 2025-07-10 DIAGNOSIS — E11.40 TYPE 2 DIABETES MELLITUS WITH DIABETIC NEUROPATHY, WITH LONG-TERM CURRENT USE OF INSULIN (H): Primary | ICD-10-CM

## 2025-07-10 DIAGNOSIS — F33.1 MODERATE EPISODE OF RECURRENT MAJOR DEPRESSIVE DISORDER (H): ICD-10-CM

## 2025-07-10 DIAGNOSIS — Z79.4 TYPE 2 DIABETES MELLITUS WITH DIABETIC NEUROPATHY, WITH LONG-TERM CURRENT USE OF INSULIN (H): ICD-10-CM

## 2025-07-10 DIAGNOSIS — N18.32 CHRONIC KIDNEY DISEASE (CKD) STAGE G3B/A1, MODERATELY DECREASED GLOMERULAR FILTRATION RATE (GFR) BETWEEN 30-44 ML/MIN/1.73 SQUARE METER AND ALBUMINURIA CREATININE RATIO LESS THAN 30 MG/G (H): ICD-10-CM

## 2025-07-10 DIAGNOSIS — N18.31 STAGE 3A CHRONIC KIDNEY DISEASE (H): ICD-10-CM

## 2025-07-10 DIAGNOSIS — Z79.4 TYPE 2 DIABETES MELLITUS WITH DIABETIC NEUROPATHY, WITH LONG-TERM CURRENT USE OF INSULIN (H): Primary | ICD-10-CM

## 2025-07-10 DIAGNOSIS — E78.5 DYSLIPIDEMIA: ICD-10-CM

## 2025-07-10 DIAGNOSIS — I10 PRIMARY HYPERTENSION: ICD-10-CM

## 2025-07-10 DIAGNOSIS — I25.118 CORONARY ARTERY DISEASE OF NATIVE ARTERY OF NATIVE HEART WITH STABLE ANGINA PECTORIS: ICD-10-CM

## 2025-07-10 DIAGNOSIS — I21.4 NSTEMI (NON-ST ELEVATED MYOCARDIAL INFARCTION) (H): ICD-10-CM

## 2025-07-10 DIAGNOSIS — E11.40 TYPE 2 DIABETES MELLITUS WITH DIABETIC NEUROPATHY, WITH LONG-TERM CURRENT USE OF INSULIN (H): ICD-10-CM

## 2025-07-10 LAB
EST. AVERAGE GLUCOSE BLD GHB EST-MCNC: 174 MG/DL
HBA1C MFR BLD: 7.7 % (ref 0–5.6)

## 2025-07-10 PROCEDURE — 80048 BASIC METABOLIC PNL TOTAL CA: CPT

## 2025-07-10 PROCEDURE — 36415 COLL VENOUS BLD VENIPUNCTURE: CPT

## 2025-07-10 PROCEDURE — 3051F HG A1C>EQUAL 7.0%<8.0%: CPT

## 2025-07-10 PROCEDURE — 83036 HEMOGLOBIN GLYCOSYLATED A1C: CPT

## 2025-07-10 RX ORDER — (INSULIN DEGLUDEC AND LIRAGLUTIDE) 100; 3.6 [IU]/ML; MG/ML
16-50 INJECTION, SOLUTION SUBCUTANEOUS DAILY
Qty: 15 ML | Refills: 1 | Status: SHIPPED | OUTPATIENT
Start: 2025-07-10

## 2025-07-10 NOTE — PROGRESS NOTES
Medication Therapy Management (MTM) Encounter    ASSESSMENT:                            Medication Adherence/Access: No issues identified.    Diabetes   Patient is not meeting A1c goal of < 8%.  Self monitoring of blood glucose is not at goal of > 70% time in target with continuous glucose monitoring. Recommend changing meal time insulin dose to help lower elevated post-prandial blood glucose and prevent lunch time hypoglycemia and changing Lantus to Xultophy for GLP1RA addition.    Hypertension   /CAD/Hx of NSTEMI (2025) s/p PCI/CKD G3/Chronic Anemia  Recommend moving sodium bicarb dose to later in the evening with other pills to help with adherence and taking twice daily as prescribed.     Hyperlipidemia   Stable.    Depression:   stable    PLAN:                            Take sodium bicarbonate 324 twice daily - can take it later in the evening.     Change Novolog doses to 14 units in the morning before breakfast, decrease to 12 units before lunch and 16 units before dinner. No more bedtime Novolog.  If you have cereal in the morning, use 16 units of Novolog before breakfast.     Change Lantus to Xultophy 16 units once daily in the morning. Increase by 2 units every week until your fasting blood glucose are less than 150 mg/dL.       Follow-up: Return in about 2 months (around 9/10/2025) for Medication Therapy Management Pharmacist.    SUBJECTIVE/OBJECTIVE:                          Caio Morgan is a 65 year old male seen for a follow-up visit.       Reason for visit: blood glucose review.    Allergies/ADRs: none  Past Medical History: Reviewed in chart  Tobacco: He reports that he has quit smoking. His smoking use included cigarettes. He has never used smokeless tobacco.  Alcohol: A couple drinks per month     Medication Adherence/Access: Per patient, misses medication 1-2 time(s) per week. He is working on getting into a better routine with Brianne reminders and putting pill box in the kitchen. Patient has a few  older medications he seems to be taking today and has several spilled medications in his medication bag.     Diabetes   Aspirin 81 mg daily  Insulin aspart 14 units three times daily plus 4-6 units at bedtime based on sliding scale  Insulin glargine 50 units once daily in the morning  Farxiga 10 mg once daily     Reports no side effects. Never tried Mounjaro due to cost - ~$200/month. Farxia was $300/3 month supply.   Current diabetes symptoms/side effects: hypoglycemia has some periods of lows in the afternoon after lunch 2-3x per week but only below 60 1x a month where he feels shaky otherwise no symptoms  Diet/Exercise: currently is eating three meals a day. Breakfast looks like bowl of cereal. Doesn't always get a snack for lunch (sandwich, carrots, chips). Dinner usually looks like pizza, some variety. Working closely with TOSHA Guzman on nutrition. [place] for lunch. Exercise limited but going to cardiac rehab. Using walker.   History: Jardiance (cost), metformin (CKD and diarrhea), glipizide       Blood sugar monitoring: Continuous Glucose Monitor     Eye exam is up to date  Foot exam: due    Hypertension   /CAD/Hx of NSTEMI (2025) s/p PCI/CKD G3/Chronic Anemia  Amlodipine 5 mg daily  Metoprolol succinate 25 mg daily  Prasugrel 10 mg daily  Nitroglycerin 0.4 mg SL tablet as needed - not needing   Isosorbide mononitrate 120 mg daily - increased dose recently   Aspirin 81 mg daily  Sodium bicarbonate 325 mg twice daily - taking once daily only bc forgets lunch dose    Reports no side effects now.   Per hospital discharge and cardiology January 2025, supposed to be on DAPT x 6M. Reports not needing nitroglycerin because he is not having chest pain. Is partaking in cardiac rehabilitation.  Following cardiology and nephrology. Has been getting iron infusions.        Hyperlipidemia   Rosuvastatin 40 mg daily  Ezetimibe 10 mg daily - started last visit    Patient reports no significant myalgias or other side  effects.         Mental Health   - Depression  Vilazodone 20 mg tablet daily  Mirtazapine 45 mg tablet at bedtime    No side effects.   Has been doing DBT therapy with group sessions weekly on  and sees a therapist 1-on-1 on . Reports that this has been good and his sleep has been well. No concerns today.          Today's Vitals: /64   Wt 239 lb 6.4 oz (108.6 kg)   BMI 39.84 kg/m    ----------------      I spent 40 minutes with this patient today. All changes were made via collaborative practice agreement with BRIDGET Cardoza CNP.     A summary of these recommendations was given to the patient.    Lorena Noel, PharmD, BCACP  Medication Therapy Management Provider, [facility]  [phone]         Medication Therapy Recommendations  Stage 3 chronic kidney disease, unspecified whether stage 3a or 3b CKD (H)   1 Current Medication: sodium bicarbonate 325 MG tablet   Current Medication Sig: Take 325 mg by mouth 2 times daily.   Rationale: Patient forgets to take - Adherence - Adherence   Recommendation: Provide Adherence Intervention   Status: Patient Agreed - Adherence/Education   Identified Date: 7/10/2025 Completed Date: 7/10/2025         Type 2 diabetes mellitus with diabetic neuropathy (H)   1 Current Medication: insulin aspart (NOVOLOG PEN) 100 UNIT/ML pen   Current Medication Si-18 units before each meal plus a sliding scale of 1:50> 150. Est TDD: 75 units/day   Rationale: Dose too high - Dosage too high - Safety   Recommendation: Decrease Dose   Status: Accepted per CPA   Identified Date: 7/10/2025 Completed Date: 7/10/2025   Note: Change Novolog doses to 14 units in the morning before breakfast, decrease to 12 units before lunch and 16 units before dinner. No more bedtime Novolog.         2 Current Medication: insulin glargine 100 UNIT/ML pen (Discontinued)   Current Medication Sig: Inject 56 Units subcutaneously daily. DOSE CHANGE do Not fill  unless patient requests   Rationale: More effective medication available - Ineffective medication - Effectiveness   Recommendation: Change Medication - Xultophy 100-3.6 UNIT-MG/ML Sopn   Status: Accepted per CPA   Identified Date: 7/10/2025 Completed Date: 7/10/2025

## 2025-07-10 NOTE — PATIENT INSTRUCTIONS
"Recommendations from today's MTM visit:                                                         Change Lantus to Xultophy 16 units once daily in the morning. Increase by 2 units every week until your fasting blood glucose are less than 150 mg/dL.     Change Novolog dose to 14 units in the morning before breakfast, decrease to 12 units before lunch and 16 units before dinner. No more bedtime Novolog. If you have cereal in the morning, use 16 units of Novolog before breakfast.     Take sodium bicarbonate 324 twice daily - can take it later in the evening.     It was great speaking with you today.  I value your experience and would be very thankful for your time in providing feedback in our clinic survey. In the next few days, you may receive an email or text message from [employer] with a link to a survey related to your  clinical pharmacist.\"     To schedule another MTM appointment, please call the clinic directly or you may call the MTM scheduling line at [phone] or toll-free at [phone].     My Clinical Pharmacist's contact information:                                                      Please feel free to contact me with any questions or concerns you have.      Lorena Noel, PharmD, BCACP  Medication Therapy Management Provider, [facility]    "

## 2025-07-11 LAB
ANION GAP SERPL CALCULATED.3IONS-SCNC: 11 MMOL/L (ref 7–15)
BUN SERPL-MCNC: 39.3 MG/DL (ref 8–23)
CALCIUM SERPL-MCNC: 9.5 MG/DL (ref 8.8–10.4)
CHLORIDE SERPL-SCNC: 104 MMOL/L (ref 98–107)
CREAT SERPL-MCNC: 2.3 MG/DL (ref 0.67–1.17)
EGFRCR SERPLBLD CKD-EPI 2021: 30 ML/MIN/1.73M2
GLUCOSE SERPL-MCNC: 172 MG/DL (ref 70–99)
HCO3 SERPL-SCNC: 23 MMOL/L (ref 22–29)
POTASSIUM SERPL-SCNC: 5.2 MMOL/L (ref 3.4–5.3)
SODIUM SERPL-SCNC: 138 MMOL/L (ref 135–145)

## 2025-07-16 ASSESSMENT — SLEEP AND FATIGUE QUESTIONNAIRES
HOW LIKELY ARE YOU TO NOD OFF OR FALL ASLEEP WHILE SITTING AND READING: SLIGHT CHANCE OF DOZING
HOW LIKELY ARE YOU TO NOD OFF OR FALL ASLEEP WHILE SITTING AND TALKING TO SOMEONE: WOULD NEVER DOZE
HOW LIKELY ARE YOU TO NOD OFF OR FALL ASLEEP WHILE LYING DOWN TO REST IN THE AFTERNOON WHEN CIRCUMSTANCES PERMIT: SLIGHT CHANCE OF DOZING
HOW LIKELY ARE YOU TO NOD OFF OR FALL ASLEEP WHILE SITTING INACTIVE IN A PUBLIC PLACE: SLIGHT CHANCE OF DOZING
HOW LIKELY ARE YOU TO NOD OFF OR FALL ASLEEP WHILE WATCHING TV: MODERATE CHANCE OF DOZING
HOW LIKELY ARE YOU TO NOD OFF OR FALL ASLEEP WHILE SITTING QUIETLY AFTER LUNCH WITHOUT ALCOHOL: SLIGHT CHANCE OF DOZING
HOW LIKELY ARE YOU TO NOD OFF OR FALL ASLEEP WHEN YOU ARE A PASSENGER IN A CAR FOR AN HOUR WITHOUT A BREAK: SLIGHT CHANCE OF DOZING
HOW LIKELY ARE YOU TO NOD OFF OR FALL ASLEEP IN A CAR, WHILE STOPPED FOR A FEW MINUTES IN TRAFFIC: WOULD NEVER DOZE

## 2025-07-18 NOTE — PROGRESS NOTES
Virtual Visit Details    Type of service:  Video Visit   Video Start Time: 10:25AM  Video End Time:10:41AM  Originating Location (pt. Location): Home    Distant Location (provider location):  Off-site  Platform used for Video Visit: [facility]   Outpatient Sleep Medicine  Jul 21, 2025       Name: Steve Morgan MRN# [id number]   Age: 65 year old YOB: 1960            Assessment and Plan:   1. DAMIÁN on CPAP (Primary)  Patient's sleep apnea is adequately managed and well treated with current PAP settings [id number] with low residual AHI of 0.9 events per hour. Compliance is excellent.  Continues to tolerate CPAP therapy very well and benefits significantly from use.  Only concern today is that his humidifier is no longer functioning on his machine, has upcoming appointment with DME to get things checked out but we also discussed if they cannot repair he would be eligible next month for replacement.  Prescription written today for new mask and supplies but also renewed order for replacement machine in the event current machine cannot be fixed.  If he ends up getting a replacement device will need a compliance follow-up a couple months afterwards for insurance coverage, if he does not need replacement machine we will plan on an annual visit.  - Comprehensive DME       Chief Complaint      Chief Complaint   Patient presents with    RECHECK          History of Present Illness:     Steve Morgan is a 65 year old male with obesity, HTN, DM with peripheral neuropathy, hypothyroidism, PAD, BPH, GERD, CAD w/NSTEMI , SHELBY and depression who presents to the clinic for follow-up of their severe upper airway resistance syndrome/obstructive sleep apnea treated with CPAP.      Originally diagnosed with DAMIÁN 10/3/2002 through [facility] ([id number]#) with split night study revealing AHI 31/hr, RDI 46/hr, oxygen johnnie 83%; CPAP 8cm identified as optimal setting and started CPAP at that  "time. More recently, he had a split night PSG completed on 1/18/2010 through [facility] revealing predominantly respiratory effort related arousals and severe upper airway resistance syndrome with an AHI 1.6 and RDI 33.8; CPAP 8cmH2O again adequately treated sleep disordered breathing events. He was treated with CPAP 8cmH2O for a number of years until switched to auto-CPAP [id number] in 12/2014 due to residular snoring and weight gain (pt was 104kg at time of study, 107kg in 12/2014). Replacement machine through [facility] on 8/14/2020.     Last seen 11/28/2023 and returns today for routine PAP follow-up visit. States \"my humidifier doesn't seem to be working like it should it is not pulling water like it used to\".     Overall, the patient rates their experience with PAP as 8 (0 poor, 10 great). Patient is using a nasal mask. The mask is comfortable. The mask is not leaking. They are not snoring with the mask on. They are not having gasp arousals.  They are not having significant oral/nasal dryness or epistaxis. The pressure settings are comfortable.     Bedtime is typically 10:30PM. Usually it takes about 20-30 minutes to fall asleep with the mask on. Wake time is typically 8:30AM.  Patient estimates they are using PAP therapy 8 hours per night. The patient estimates they are usually getting 8-9 hours of sleep per night. Does feel well rested in AM.     ResMed Auto-PAP 7-15 cmH2O download (6/14/25-7/13/25):  30 total days of use. 0 nonuse days. 30 days with >4 hours use.  Average use 9 hours 17 minutes per day. Median Leak 3.8 L/min. 95%ile Leak 15.6 L/min. Median pressure 8.8cm. CPAP 95% pressure 10.7cm. AHI 0.9    SCALES:   INSOMNIA: Insomnia Severity Score: 4   SLEEPINESS: Driscoll Sleepiness Score: 7    Past medical/surgical history, family history, social history, medications and allergies were reviewed.           Physical Examination:   Ht 1.651 m (5' 5\")   Wt 108.9 kg (240 lb)   BMI 39.94 kg/m    General appearance: " Awake, alert, cooperative. Well groomed. Sitting comfortably in chair. In no apparent distress.  HEENT: Head: Normocephalic, atraumatic. Eyes:Conjunctiva clear. Sclera normal. Nose: External appearance without deformity.   Pulmonary:  Able to speak easily in full sentences. No cough or wheeze.   Skin:  No rashes or significant lesions on visible skin.   Neurologic: Alert, oriented x3.   Psychiatric: Mood euthymic. Affect congruent with full range and intensity.      CC:  Sindhu Mohan PA-C  Jul 21, 2025     [facility]  [address] [place] [address]     [facility]  [address] [address]    Chart documentation was completed, in part, with [employer] voice-recognition software. Even though reviewed, some grammatical, spelling, and word errors may remain.    22 minutes spent on day of encounter doing chart review, history and exam, documentation, and further activities as noted above

## 2025-07-21 ENCOUNTER — VIRTUAL VISIT (OUTPATIENT)
Dept: SLEEP MEDICINE | Facility: CLINIC | Age: 65
End: 2025-07-21
Payer: MEDICARE

## 2025-07-21 VITALS — BODY MASS INDEX: 39.99 KG/M2 | WEIGHT: 240 LBS | HEIGHT: 65 IN

## 2025-07-21 DIAGNOSIS — G47.33 OSA ON CPAP: Primary | ICD-10-CM

## 2025-07-21 PROCEDURE — 1126F AMNT PAIN NOTED NONE PRSNT: CPT | Mod: 95 | Performed by: PHYSICIAN ASSISTANT

## 2025-07-21 PROCEDURE — 98005 SYNCH AUDIO-VIDEO EST LOW 20: CPT | Performed by: PHYSICIAN ASSISTANT

## 2025-07-21 ASSESSMENT — PAIN SCALES - GENERAL: PAINLEVEL_OUTOF10: NO PAIN (0)

## 2025-07-21 NOTE — NURSING NOTE
Current patient location: [address]  [facility] [address]    Is the patient currently in the state of [place]? YES    Visit mode: VIDEO    If the visit is dropped, the patient can be reconnected by:VIDEO VISIT: Text to cell phone:   Telephone Information:   Mobile [phone]       Will anyone else be joining the visit? NO  (If patient encounters technical issues they should call [phone]    Are changes needed to the allergy or medication list? Pt stated no changes to allergies and Pt stated no med changes    Are refills needed on medications prescribed by this physician? NO    Rooming Documentation:  Questionnaire(s) completed    Reason for visit: BHUMI CHINF

## 2025-08-05 ENCOUNTER — INFUSION THERAPY VISIT (OUTPATIENT)
Dept: INFUSION THERAPY | Facility: CLINIC | Age: 65
End: 2025-08-05
Attending: INTERNAL MEDICINE
Payer: MEDICARE

## 2025-08-05 VITALS
DIASTOLIC BLOOD PRESSURE: 78 MMHG | TEMPERATURE: 97.2 F | RESPIRATION RATE: 16 BRPM | SYSTOLIC BLOOD PRESSURE: 138 MMHG | OXYGEN SATURATION: 97 % | HEART RATE: 72 BPM

## 2025-08-05 DIAGNOSIS — D63.1 ANEMIA OF CHRONIC RENAL FAILURE, STAGE 3A (H): ICD-10-CM

## 2025-08-05 DIAGNOSIS — D50.9 IRON DEFICIENCY ANEMIA, UNSPECIFIED IRON DEFICIENCY ANEMIA TYPE: Primary | ICD-10-CM

## 2025-08-05 DIAGNOSIS — N18.31 STAGE 3A CHRONIC KIDNEY DISEASE (H): ICD-10-CM

## 2025-08-05 DIAGNOSIS — N18.31 ANEMIA OF CHRONIC RENAL FAILURE, STAGE 3A (H): ICD-10-CM

## 2025-08-05 PROCEDURE — 250N000011 HC RX IP 250 OP 636: Performed by: INTERNAL MEDICINE

## 2025-08-05 PROCEDURE — 258N000003 HC RX IP 258 OP 636: Performed by: INTERNAL MEDICINE

## 2025-08-05 PROCEDURE — 96365 THER/PROPH/DIAG IV INF INIT: CPT

## 2025-08-05 RX ORDER — HEPARIN SODIUM,PORCINE 10 UNIT/ML
5-20 VIAL (ML) INTRAVENOUS DAILY PRN
OUTPATIENT
Start: 2025-08-07

## 2025-08-05 RX ORDER — DIPHENHYDRAMINE HYDROCHLORIDE 50 MG/ML
50 INJECTION, SOLUTION INTRAMUSCULAR; INTRAVENOUS
Start: 2025-08-07

## 2025-08-05 RX ORDER — HEPARIN SODIUM (PORCINE) LOCK FLUSH IV SOLN 100 UNIT/ML 100 UNIT/ML
5 SOLUTION INTRAVENOUS
OUTPATIENT
Start: 2025-08-07

## 2025-08-05 RX ORDER — METHYLPREDNISOLONE SODIUM SUCCINATE 40 MG/ML
40 INJECTION INTRAMUSCULAR; INTRAVENOUS
Start: 2025-08-07

## 2025-08-05 RX ORDER — EPINEPHRINE 1 MG/ML
0.3 INJECTION, SOLUTION INTRAMUSCULAR; SUBCUTANEOUS EVERY 5 MIN PRN
OUTPATIENT
Start: 2025-08-07

## 2025-08-05 RX ORDER — DIPHENHYDRAMINE HYDROCHLORIDE 50 MG/ML
25 INJECTION, SOLUTION INTRAMUSCULAR; INTRAVENOUS
Start: 2025-08-07

## 2025-08-05 RX ORDER — ALBUTEROL SULFATE 90 UG/1
1-2 INHALANT RESPIRATORY (INHALATION)
Start: 2025-08-07

## 2025-08-05 RX ORDER — MEPERIDINE HYDROCHLORIDE 25 MG/ML
25 INJECTION INTRAMUSCULAR; INTRAVENOUS; SUBCUTANEOUS
OUTPATIENT
Start: 2025-08-07

## 2025-08-05 RX ORDER — ALBUTEROL SULFATE 0.83 MG/ML
2.5 SOLUTION RESPIRATORY (INHALATION)
OUTPATIENT
Start: 2025-08-07

## 2025-08-05 RX ADMIN — IRON SUCROSE 300 MG: 20 INJECTION, SOLUTION INTRAVENOUS at 09:14

## 2025-08-06 ENCOUNTER — HOSPITAL ENCOUNTER (OUTPATIENT)
Dept: CARDIAC REHAB | Facility: CLINIC | Age: 65
Discharge: HOME | End: 2025-08-06
Attending: INTERNAL MEDICINE
Payer: MEDICARE

## 2025-08-06 DIAGNOSIS — I25.118 CORONARY ARTERY DISEASE OF NATIVE ARTERY OF NATIVE HEART WITH STABLE ANGINA PECTORIS: ICD-10-CM

## 2025-08-06 PROCEDURE — 93798 PHYS/QHP OP CAR RHAB W/ECG: CPT

## 2025-08-06 RX ORDER — METOPROLOL SUCCINATE 25 MG/1
25 TABLET, EXTENDED RELEASE ORAL DAILY
Qty: 90 TABLET | Refills: 0 | OUTPATIENT
Start: 2025-08-06

## 2025-08-08 ENCOUNTER — HOSPITAL ENCOUNTER (OUTPATIENT)
Dept: CARDIAC REHAB | Facility: CLINIC | Age: 65
Discharge: HOME | End: 2025-08-08
Attending: INTERNAL MEDICINE
Payer: MEDICARE

## 2025-08-08 PROCEDURE — 93798 PHYS/QHP OP CAR RHAB W/ECG: CPT

## 2025-08-11 ENCOUNTER — INFUSION THERAPY VISIT (OUTPATIENT)
Dept: INFUSION THERAPY | Facility: CLINIC | Age: 65
End: 2025-08-11
Attending: INTERNAL MEDICINE
Payer: MEDICARE

## 2025-08-11 VITALS
OXYGEN SATURATION: 96 % | DIASTOLIC BLOOD PRESSURE: 78 MMHG | TEMPERATURE: 98 F | HEART RATE: 77 BPM | SYSTOLIC BLOOD PRESSURE: 135 MMHG | RESPIRATION RATE: 16 BRPM

## 2025-08-11 DIAGNOSIS — D63.1 ANEMIA OF CHRONIC RENAL FAILURE, STAGE 3A (H): ICD-10-CM

## 2025-08-11 DIAGNOSIS — D50.9 IRON DEFICIENCY ANEMIA, UNSPECIFIED IRON DEFICIENCY ANEMIA TYPE: Primary | ICD-10-CM

## 2025-08-11 DIAGNOSIS — N18.31 ANEMIA OF CHRONIC RENAL FAILURE, STAGE 3A (H): ICD-10-CM

## 2025-08-11 DIAGNOSIS — N18.31 STAGE 3A CHRONIC KIDNEY DISEASE (H): ICD-10-CM

## 2025-08-11 PROCEDURE — 258N000003 HC RX IP 258 OP 636: Performed by: INTERNAL MEDICINE

## 2025-08-11 PROCEDURE — 96365 THER/PROPH/DIAG IV INF INIT: CPT

## 2025-08-11 PROCEDURE — 250N000011 HC RX IP 250 OP 636: Performed by: INTERNAL MEDICINE

## 2025-08-11 RX ORDER — DIPHENHYDRAMINE HYDROCHLORIDE 50 MG/ML
25 INJECTION, SOLUTION INTRAMUSCULAR; INTRAVENOUS
Start: 2025-08-12

## 2025-08-11 RX ORDER — METHYLPREDNISOLONE SODIUM SUCCINATE 40 MG/ML
40 INJECTION INTRAMUSCULAR; INTRAVENOUS
Start: 2025-08-12

## 2025-08-11 RX ORDER — EPINEPHRINE 1 MG/ML
0.3 INJECTION, SOLUTION INTRAMUSCULAR; SUBCUTANEOUS EVERY 5 MIN PRN
OUTPATIENT
Start: 2025-08-12

## 2025-08-11 RX ORDER — ALBUTEROL SULFATE 0.83 MG/ML
2.5 SOLUTION RESPIRATORY (INHALATION)
OUTPATIENT
Start: 2025-08-12

## 2025-08-11 RX ORDER — ALBUTEROL SULFATE 90 UG/1
1-2 INHALANT RESPIRATORY (INHALATION)
Start: 2025-08-12

## 2025-08-11 RX ORDER — DIPHENHYDRAMINE HYDROCHLORIDE 50 MG/ML
50 INJECTION, SOLUTION INTRAMUSCULAR; INTRAVENOUS
Start: 2025-08-12

## 2025-08-11 RX ORDER — HEPARIN SODIUM,PORCINE 10 UNIT/ML
5-20 VIAL (ML) INTRAVENOUS DAILY PRN
OUTPATIENT
Start: 2025-08-12

## 2025-08-11 RX ORDER — MEPERIDINE HYDROCHLORIDE 25 MG/ML
25 INJECTION INTRAMUSCULAR; INTRAVENOUS; SUBCUTANEOUS
OUTPATIENT
Start: 2025-08-12

## 2025-08-11 RX ORDER — HEPARIN SODIUM (PORCINE) LOCK FLUSH IV SOLN 100 UNIT/ML 100 UNIT/ML
5 SOLUTION INTRAVENOUS
OUTPATIENT
Start: 2025-08-12

## 2025-08-11 RX ADMIN — IRON SUCROSE 300 MG: 20 INJECTION, SOLUTION INTRAVENOUS at 10:04

## 2025-08-14 ENCOUNTER — DOCUMENTATION ONLY (OUTPATIENT)
Dept: SLEEP MEDICINE | Facility: CLINIC | Age: 65
End: 2025-08-14
Payer: MEDICARE

## 2025-08-14 DIAGNOSIS — G47.33 OBSTRUCTIVE SLEEP APNEA (ADULT) (PEDIATRIC): Primary | ICD-10-CM

## 2025-08-15 ENCOUNTER — HOSPITAL ENCOUNTER (OUTPATIENT)
Dept: CARDIAC REHAB | Facility: CLINIC | Age: 65
Discharge: HOME | End: 2025-08-15
Attending: INTERNAL MEDICINE
Payer: MEDICARE

## 2025-08-15 LAB
GLUCOSE BLDC GLUCOMTR-MCNC: 147 MG/DL (ref 70–99)
GLUCOSE BLDC GLUCOMTR-MCNC: 218 MG/DL (ref 70–99)

## 2025-08-15 PROCEDURE — 93798 PHYS/QHP OP CAR RHAB W/ECG: CPT

## 2025-08-26 DIAGNOSIS — Z79.4 TYPE 2 DIABETES MELLITUS WITH DIABETIC NEUROPATHY, WITH LONG-TERM CURRENT USE OF INSULIN (H): ICD-10-CM

## 2025-08-26 DIAGNOSIS — E11.40 TYPE 2 DIABETES MELLITUS WITH DIABETIC NEUROPATHY, WITH LONG-TERM CURRENT USE OF INSULIN (H): ICD-10-CM

## 2025-08-28 RX ORDER — INSULIN ASPART 100 [IU]/ML
INJECTION, SOLUTION INTRAVENOUS; SUBCUTANEOUS
Qty: 15 ML | Refills: 0 | Status: SHIPPED | OUTPATIENT
Start: 2025-08-28

## 2025-09-03 ENCOUNTER — TELEPHONE (OUTPATIENT)
Dept: RHEUMATOLOGY | Facility: CLINIC | Age: 65
End: 2025-09-03
Payer: MEDICARE

## 2025-09-05 ENCOUNTER — PRE VISIT (OUTPATIENT)
Dept: NEUROLOGY | Facility: CLINIC | Age: 65
End: 2025-09-05

## (undated) DEVICE — SYR 20ML LL W/O NDL 302830

## (undated) DEVICE — WIRE GUIDE 0.035"X260CM SAFE-T-J EXCHANGE G00517

## (undated) DEVICE — SLEEVE TR BAND RADIAL COMPRESSION DEVICE 24CM TRB24-REG

## (undated) DEVICE — CATH BALLOON NC EMERGE 2.75X15MM H7493926715270

## (undated) DEVICE — DRSG GAUZE 4X4" 8044

## (undated) DEVICE — TOTE ANGIO CORP PC15AT SAN32CC83O

## (undated) DEVICE — CATH BALLOON EMERGE 4.0X8MM H7493918908400

## (undated) DEVICE — APPLICATORS COTTON TIP 6"X2 STERILE LF C15053-006

## (undated) DEVICE — LINEN HALF SHEET 5512

## (undated) DEVICE — INTRO GLIDESHEATH SLENDER 6FR 10X45CM 60-1060

## (undated) DEVICE — NDL ANGIOCATH 14GA 1.25" 3068

## (undated) DEVICE — CATH GUIDING 6FR AL .75 LA6AL75

## (undated) DEVICE — SU ETHILON 2-0 PS 18" 585H

## (undated) DEVICE — CUTTING BALLOON WOLVERINE 2.5X15MM

## (undated) DEVICE — PACK HAND SOP32HARMO

## (undated) DEVICE — DRSG XEROFORM 1X8"

## (undated) DEVICE — PREP CHLORAPREP 26ML TINTED HI-LITE ORANGE 930815

## (undated) DEVICE — DRSG TEGADERM 4X4 3/4" 1626W

## (undated) DEVICE — SYR 10ML SLIP TIP W/O NDL 303134

## (undated) DEVICE — BAG CLEAR TRASH 1.3M 39X33" P4040C

## (undated) DEVICE — GUIDEWIRE VASC 0.014INX180CM RUNTHROUGH 25-1011

## (undated) DEVICE — MANIFOLD KIT ANGIO AUTOMATED 014613

## (undated) DEVICE — CATH BALLOON EMERGE 2.0X15MM H7493918915200

## (undated) DEVICE — DRSG KERLIX FLUFFS X5

## (undated) DEVICE — CATH BALLOON NC EMERGE 4.50X12MM H7493926712450

## (undated) DEVICE — SU PDS II 2-0 CT-1 27" Z339H

## (undated) DEVICE — KIT HAND CONTROL ANGIOTOUCH ACIST 65CM AT-P65

## (undated) DEVICE — ESU GROUND PAD ADULT W/CORD E7507

## (undated) DEVICE — PREP POVIDONE-IODINE 7.5% SCRUB 4OZ BOTTLE MDS093945

## (undated) DEVICE — GUIDEWIRE VASC 0.035INX150CM INQWIRE J TIP IQ35F150J3F/A

## (undated) DEVICE — INFL DVC BASIXCOMPAK PLYCRB 30 ATM 13IN 20ML IN4530

## (undated) DEVICE — CATH BALLOON CUTTING WOLVERINE 4.00X10MH74939401104000

## (undated) DEVICE — VALVE HEMOSTASIS .096" COPILOT MECH 1003331

## (undated) DEVICE — PACK TOTAL KNEE BOXED LATEX FREE PO15TKFCT

## (undated) DEVICE — CATH BALLOON NC EMERGE 4.00X15MM H7493926715400

## (undated) DEVICE — KIT CULTURE ESWAB AEROBE/ANAEROBE WHITE TOP R723480

## (undated) DEVICE — CAST BUCKET

## (undated) DEVICE — SUCTION MANIFOLD NEPTUNE 2 SYS 4 PORT 0702-020-000

## (undated) DEVICE — DEFIB PRO-PADZ LVP LQD GEL ADULT 8900-2105-01

## (undated) DEVICE — GLOVE BIOGEL PI SZ 8.0 40880

## (undated) DEVICE — GLOVE BIOGEL PI SZ 6.0 40860

## (undated) DEVICE — SOL NACL 0.9% IRRIG 1000ML BOTTLE 2F7124

## (undated) DEVICE — GOWN IMPERVIOUS SPECIALTY XLG/XLONG 32474

## (undated) DEVICE — TUBE CULTURE AEROBIC/ANAEROBIC W/O SWABS A.C.T.I.1. 12401

## (undated) DEVICE — SOL NACL 0.9% IRRIG 3000ML BAG 2B7477

## (undated) DEVICE — GLOVE BIOGEL PI SZ 8.5 40885

## (undated) DEVICE — SU ETHILON 4-0 PS-2 18" BLACK 1667H

## (undated) DEVICE — GLOVE BIOGEL PI MICRO INDICATOR UNDERGLOVE SZ 8.5 48985

## (undated) DEVICE — CATH IVUS OPTICROSS HD 6 3.6FR 1.18MM DIA 135CML H7493935408

## (undated) DEVICE — CATH DIAGNOSTIC RADIAL 5FR TIG 4.0

## (undated) DEVICE — BAG STERILE DISPOSABLE FOR PERMANENT SLED 39315-010

## (undated) DEVICE — CAST PLASTER SPLINT 4X15" EXTRA FAST

## (undated) DEVICE — PREP POVIDONE IODINE SOLUTION 10% 4OZ BOTTLE 29906-004

## (undated) DEVICE — LINEN TOWEL PACK X10 5473

## (undated) DEVICE — DRSG AQUACEL AG HYDROFIBER 3.5X6" 422604

## (undated) DEVICE — TOURNIQUET SGL  BLADDER 30"X4" BLUE 5921030135

## (undated) DEVICE — TUBING IRRIG TUR Y TYPE 96" LF 6543-01

## (undated) DEVICE — GLOVE BIOGEL PI MICRO INDICATOR UNDERGLOVE SZ 6.5 48965

## (undated) DEVICE — CAST PADDING 4" UNSTERILE 9044

## (undated) DEVICE — LINEN FULL SHEET 5511

## (undated) DEVICE — TOURNIQUET SGL BLADDER 18"X4" RED 5921-218-135

## (undated) DEVICE — CAST PADDING 4" STERILE 9044S

## (undated) RX ORDER — NITROGLYCERIN 5 MG/ML
VIAL (ML) INTRAVENOUS
Status: DISPENSED
Start: 2025-01-14

## (undated) RX ORDER — FENTANYL CITRATE 50 UG/ML
INJECTION, SOLUTION INTRAMUSCULAR; INTRAVENOUS
Status: DISPENSED
Start: 2024-05-20

## (undated) RX ORDER — PRASUGREL 10 MG/1
TABLET, FILM COATED ORAL
Status: DISPENSED
Start: 2025-01-14

## (undated) RX ORDER — FENTANYL CITRATE 50 UG/ML
INJECTION, SOLUTION INTRAMUSCULAR; INTRAVENOUS
Status: DISPENSED
Start: 2022-12-23

## (undated) RX ORDER — NITROGLYCERIN 5 MG/ML
VIAL (ML) INTRAVENOUS
Status: DISPENSED
Start: 2025-01-10

## (undated) RX ORDER — ONDANSETRON 2 MG/ML
INJECTION INTRAMUSCULAR; INTRAVENOUS
Status: DISPENSED
Start: 2022-12-23

## (undated) RX ORDER — LIDOCAINE HYDROCHLORIDE 10 MG/ML
INJECTION, SOLUTION EPIDURAL; INFILTRATION; INTRACAUDAL; PERINEURAL
Status: DISPENSED
Start: 2025-01-14

## (undated) RX ORDER — HEPARIN SODIUM 200 [USP'U]/100ML
INJECTION, SOLUTION INTRAVENOUS
Status: DISPENSED
Start: 2025-01-14

## (undated) RX ORDER — FENTANYL CITRATE 50 UG/ML
INJECTION, SOLUTION INTRAMUSCULAR; INTRAVENOUS
Status: DISPENSED
Start: 2025-01-14

## (undated) RX ORDER — LIDOCAINE HYDROCHLORIDE 10 MG/ML
INJECTION, SOLUTION EPIDURAL; INFILTRATION; INTRACAUDAL; PERINEURAL
Status: DISPENSED
Start: 2025-01-10

## (undated) RX ORDER — TRANEXAMIC ACID 650 MG/1
TABLET ORAL
Status: DISPENSED
Start: 2024-05-20

## (undated) RX ORDER — HEPARIN SODIUM 1000 [USP'U]/ML
INJECTION, SOLUTION INTRAVENOUS; SUBCUTANEOUS
Status: DISPENSED
Start: 2025-01-14

## (undated) RX ORDER — FENTANYL CITRATE 50 UG/ML
INJECTION, SOLUTION INTRAMUSCULAR; INTRAVENOUS
Status: DISPENSED
Start: 2025-01-10

## (undated) RX ORDER — VERAPAMIL HYDROCHLORIDE 2.5 MG/ML
INJECTION, SOLUTION INTRAVENOUS
Status: DISPENSED
Start: 2025-01-14

## (undated) RX ORDER — HEPARIN SODIUM 1000 [USP'U]/ML
INJECTION, SOLUTION INTRAVENOUS; SUBCUTANEOUS
Status: DISPENSED
Start: 2025-01-10

## (undated) RX ORDER — CEFAZOLIN SODIUM/WATER 2 G/20 ML
SYRINGE (ML) INTRAVENOUS
Status: DISPENSED
Start: 2024-05-20

## (undated) RX ORDER — VERAPAMIL HYDROCHLORIDE 2.5 MG/ML
INJECTION, SOLUTION INTRAVENOUS
Status: DISPENSED
Start: 2025-01-10

## (undated) RX ORDER — PROPOFOL 10 MG/ML
INJECTION, EMULSION INTRAVENOUS
Status: DISPENSED
Start: 2022-12-23

## (undated) RX ORDER — CEFAZOLIN SODIUM/WATER 2 G/20 ML
SYRINGE (ML) INTRAVENOUS
Status: DISPENSED
Start: 2022-12-23